# Patient Record
Sex: FEMALE | Race: BLACK OR AFRICAN AMERICAN | Employment: UNEMPLOYED | URBAN - METROPOLITAN AREA
[De-identification: names, ages, dates, MRNs, and addresses within clinical notes are randomized per-mention and may not be internally consistent; named-entity substitution may affect disease eponyms.]

---

## 2023-03-22 ENCOUNTER — PREP FOR PROCEDURE (OUTPATIENT)
Dept: OPHTHALMOLOGY | Age: 58
End: 2023-03-22

## 2023-03-22 RX ORDER — PROPARACAINE HYDROCHLORIDE 5 MG/ML
1 SOLUTION/ DROPS OPHTHALMIC ONCE
Status: CANCELLED | OUTPATIENT
Start: 2023-03-27

## 2023-03-22 RX ORDER — PHENYLEPHRINE HCL 2.5 %
1 DROPS OPHTHALMIC (EYE) SEE ADMIN INSTRUCTIONS
Status: CANCELLED | OUTPATIENT
Start: 2023-03-27

## 2023-03-22 RX ORDER — SODIUM CHLORIDE 0.9 % (FLUSH) 0.9 %
5-40 SYRINGE (ML) INJECTION EVERY 12 HOURS SCHEDULED
Status: CANCELLED | OUTPATIENT
Start: 2023-03-22

## 2023-03-22 RX ORDER — SODIUM CHLORIDE 0.9 % (FLUSH) 0.9 %
5-40 SYRINGE (ML) INJECTION PRN
Status: CANCELLED | OUTPATIENT
Start: 2023-03-22

## 2023-03-22 RX ORDER — OFLOXACIN 3 MG/ML
1 SOLUTION/ DROPS OPHTHALMIC ONCE
Status: CANCELLED | OUTPATIENT
Start: 2023-03-27

## 2023-03-22 RX ORDER — TROPICAMIDE 10 MG/ML
1 SOLUTION/ DROPS OPHTHALMIC SEE ADMIN INSTRUCTIONS
Status: CANCELLED | OUTPATIENT
Start: 2023-03-27

## 2023-03-22 RX ORDER — SODIUM CHLORIDE 9 MG/ML
INJECTION, SOLUTION INTRAVENOUS PRN
Status: CANCELLED | OUTPATIENT
Start: 2023-03-22

## 2023-04-09 ENCOUNTER — PREP FOR PROCEDURE (OUTPATIENT)
Dept: OPHTHALMOLOGY | Age: 58
End: 2023-04-09

## 2023-04-09 RX ORDER — PHENYLEPHRINE HCL 2.5 %
1 DROPS OPHTHALMIC (EYE) SEE ADMIN INSTRUCTIONS
Status: CANCELLED | OUTPATIENT
Start: 2023-04-10

## 2023-04-09 RX ORDER — OFLOXACIN 3 MG/ML
1 SOLUTION/ DROPS OPHTHALMIC ONCE
Status: CANCELLED | OUTPATIENT
Start: 2023-04-10

## 2023-04-09 RX ORDER — SODIUM CHLORIDE 0.9 % (FLUSH) 0.9 %
5-40 SYRINGE (ML) INJECTION EVERY 12 HOURS SCHEDULED
Status: CANCELLED | OUTPATIENT
Start: 2023-04-09

## 2023-04-09 RX ORDER — PROPARACAINE HYDROCHLORIDE 5 MG/ML
1 SOLUTION/ DROPS OPHTHALMIC ONCE
Status: CANCELLED | OUTPATIENT
Start: 2023-04-10

## 2023-04-09 RX ORDER — SODIUM CHLORIDE 9 MG/ML
INJECTION, SOLUTION INTRAVENOUS PRN
Status: CANCELLED | OUTPATIENT
Start: 2023-04-09

## 2023-04-09 RX ORDER — TROPICAMIDE 10 MG/ML
1 SOLUTION/ DROPS OPHTHALMIC SEE ADMIN INSTRUCTIONS
Status: CANCELLED | OUTPATIENT
Start: 2023-04-10

## 2023-04-09 RX ORDER — SODIUM CHLORIDE 0.9 % (FLUSH) 0.9 %
5-40 SYRINGE (ML) INJECTION PRN
Status: CANCELLED | OUTPATIENT
Start: 2023-04-09

## 2023-05-08 ENCOUNTER — OFFICE VISIT (OUTPATIENT)
Dept: PRIMARY CARE | Facility: CLINIC | Age: 58
End: 2023-05-08
Payer: COMMERCIAL

## 2023-05-08 VITALS
HEART RATE: 72 BPM | HEIGHT: 62 IN | OXYGEN SATURATION: 100 % | TEMPERATURE: 97.6 F | SYSTOLIC BLOOD PRESSURE: 172 MMHG | BODY MASS INDEX: 40.06 KG/M2 | DIASTOLIC BLOOD PRESSURE: 110 MMHG

## 2023-05-08 DIAGNOSIS — R42 VERTIGO: ICD-10-CM

## 2023-05-08 DIAGNOSIS — D57.1 SICKLE-CELL DISEASE WITHOUT CRISIS (MULTI): ICD-10-CM

## 2023-05-08 DIAGNOSIS — I10 BENIGN ESSENTIAL HYPERTENSION: ICD-10-CM

## 2023-05-08 DIAGNOSIS — E11.43 DIABETIC AUTONOMIC NEUROPATHY ASSOCIATED WITH TYPE 2 DIABETES MELLITUS (MULTI): Primary | ICD-10-CM

## 2023-05-08 PROBLEM — E66.01 MORBID OBESITY WITH BODY MASS INDEX (BMI) OF 40.0 OR HIGHER (MULTI): Status: ACTIVE | Noted: 2023-05-08

## 2023-05-08 PROBLEM — Z90.81 ACQUIRED ABSENCE OF SPLEEN: Status: ACTIVE | Noted: 2019-04-02

## 2023-05-08 LAB
ANION GAP IN SER/PLAS: 10 MMOL/L (ref 10–20)
CALCIUM (MG/DL) IN SER/PLAS: 8.8 MG/DL (ref 8.6–10.6)
CARBON DIOXIDE, TOTAL (MMOL/L) IN SER/PLAS: 30 MMOL/L (ref 21–32)
CHLORIDE (MMOL/L) IN SER/PLAS: 103 MMOL/L (ref 98–107)
CHOLESTEROL (MG/DL) IN SER/PLAS: 174 MG/DL (ref 0–199)
CHOLESTEROL IN HDL (MG/DL) IN SER/PLAS: 38.5 MG/DL
CHOLESTEROL/HDL RATIO: 4.5
CREATININE (MG/DL) IN SER/PLAS: 0.89 MG/DL (ref 0.5–1.05)
GFR FEMALE: 75 ML/MIN/1.73M2
GLUCOSE (MG/DL) IN SER/PLAS: 228 MG/DL (ref 74–99)
LDL: 98 MG/DL (ref 0–99)
POC HEMOGLOBIN A1C: 14 % (ref 4.2–6.5)
POTASSIUM (MMOL/L) IN SER/PLAS: 4.4 MMOL/L (ref 3.5–5.3)
SODIUM (MMOL/L) IN SER/PLAS: 139 MMOL/L (ref 136–145)
TRIGLYCERIDE (MG/DL) IN SER/PLAS: 189 MG/DL (ref 0–149)
UREA NITROGEN (MG/DL) IN SER/PLAS: 18 MG/DL (ref 6–23)
VLDL: 38 MG/DL (ref 0–40)

## 2023-05-08 PROCEDURE — 99214 OFFICE O/P EST MOD 30 MIN: CPT | Performed by: FAMILY MEDICINE

## 2023-05-08 PROCEDURE — 83036 HEMOGLOBIN GLYCOSYLATED A1C: CPT | Performed by: FAMILY MEDICINE

## 2023-05-08 PROCEDURE — 4010F ACE/ARB THERAPY RXD/TAKEN: CPT | Performed by: FAMILY MEDICINE

## 2023-05-08 PROCEDURE — 80061 LIPID PANEL: CPT

## 2023-05-08 PROCEDURE — 3008F BODY MASS INDEX DOCD: CPT | Performed by: FAMILY MEDICINE

## 2023-05-08 PROCEDURE — 3080F DIAST BP >= 90 MM HG: CPT | Performed by: FAMILY MEDICINE

## 2023-05-08 PROCEDURE — 1036F TOBACCO NON-USER: CPT | Performed by: FAMILY MEDICINE

## 2023-05-08 PROCEDURE — 80048 BASIC METABOLIC PNL TOTAL CA: CPT

## 2023-05-08 PROCEDURE — 3077F SYST BP >= 140 MM HG: CPT | Performed by: FAMILY MEDICINE

## 2023-05-08 RX ORDER — LISINOPRIL 40 MG/1
40 TABLET ORAL DAILY
COMMUNITY
Start: 2019-10-14 | End: 2023-07-26 | Stop reason: SDUPTHER

## 2023-05-08 RX ORDER — METFORMIN HYDROCHLORIDE 1000 MG/1
1000 TABLET ORAL
COMMUNITY
Start: 2019-06-04 | End: 2024-01-18 | Stop reason: HOSPADM

## 2023-05-08 RX ORDER — INSULIN GLARGINE 100 [IU]/ML
75 INJECTION, SOLUTION SUBCUTANEOUS NIGHTLY
COMMUNITY
End: 2023-05-08 | Stop reason: SDUPTHER

## 2023-05-08 RX ORDER — MECLIZINE HCL 25MG 25 MG/1
25 TABLET, CHEWABLE ORAL 3 TIMES DAILY PRN
Qty: 90 TABLET | Refills: 1 | Status: SHIPPED | OUTPATIENT
Start: 2023-05-08 | End: 2023-07-26 | Stop reason: SDUPTHER

## 2023-05-08 RX ORDER — ATORVASTATIN CALCIUM 40 MG/1
40 TABLET, FILM COATED ORAL DAILY
COMMUNITY
End: 2023-07-26 | Stop reason: SDUPTHER

## 2023-05-08 RX ORDER — INSULIN LISPRO 100 [IU]/ML
0.1 INJECTION, SOLUTION INTRAVENOUS; SUBCUTANEOUS
COMMUNITY
End: 2024-02-27 | Stop reason: SDUPTHER

## 2023-05-08 RX ORDER — GABAPENTIN 300 MG/1
300 CAPSULE ORAL 2 TIMES DAILY
Status: ON HOLD | COMMUNITY
End: 2024-01-25 | Stop reason: ALTCHOICE

## 2023-05-08 RX ORDER — INSULIN GLARGINE 100 [IU]/ML
75 INJECTION, SOLUTION SUBCUTANEOUS NIGHTLY
Qty: 67.5 ML | Refills: 1 | Status: SHIPPED | OUTPATIENT
Start: 2023-05-08 | End: 2024-01-18 | Stop reason: HOSPADM

## 2023-05-08 RX ORDER — MECLIZINE HCL 25MG 25 MG/1
25 TABLET, CHEWABLE ORAL DAILY PRN
COMMUNITY
End: 2023-05-08 | Stop reason: SDUPTHER

## 2023-05-08 RX ORDER — CLONIDINE HYDROCHLORIDE 0.1 MG/1
0.1 TABLET ORAL 2 TIMES DAILY
Qty: 60 TABLET | Refills: 5 | Status: ON HOLD | OUTPATIENT
Start: 2023-05-08 | End: 2024-01-18

## 2023-05-08 ASSESSMENT — PAIN SCALES - GENERAL: PAINLEVEL: 0-NO PAIN

## 2023-05-08 ASSESSMENT — PATIENT HEALTH QUESTIONNAIRE - PHQ9
2. FEELING DOWN, DEPRESSED OR HOPELESS: NOT AT ALL
SUM OF ALL RESPONSES TO PHQ9 QUESTIONS 1 AND 2: 0
1. LITTLE INTEREST OR PLEASURE IN DOING THINGS: NOT AT ALL

## 2023-05-08 NOTE — PROGRESS NOTES
"Subjective   Patient ID: Nuris Squires is a 57 y.o. female who presents for Diabetes (Pt,. Presents for 3 month DM follow up and feeling shaky ).  Glucose 150-250 any time of day.  75 units basaglar.  Humalog with meals.    Has a lot of stress.  Not checking BP at home.  Has HA.  No swelling.  No dizziness, but has meclizine as needed for vertigo    Retina surgery 2 months ago.  Detached retina  on the left.    Energy is not the best.  Sleeps OK, not enough hours.          Diabetes        Review of Systems    Objective   BP (!) 172/110 (BP Location: Right arm, Patient Position: Sitting, BP Cuff Size: Large adult)   Pulse 72   Temp 36.4 °C (97.6 °F) (Temporal)   Ht 1.575 m (5' 2\")   SpO2 100%   BMI 40.06 kg/m²     Physical Exam  Constitutional:       Appearance: Normal appearance.   Cardiovascular:      Rate and Rhythm: Normal rate and regular rhythm.      Heart sounds: No murmur heard.  Pulmonary:      Effort: Pulmonary effort is normal.      Breath sounds: Normal breath sounds.   Musculoskeletal:      Right lower leg: No edema.      Left lower leg: No edema.   Neurological:      Mental Status: She is alert.           Assessment/Plan   Problem List Items Addressed This Visit    None         "

## 2023-05-08 NOTE — PATIENT INSTRUCTIONS
A1C 14.0, indicating very poor control of diabetes.  Continue Basaglar 75 units at night, and humalog 10 units with meals.  Restart trulicity 0.75 weekly.  Monitor glucose.  If going below 90, call.  Recheck A1C in 3 months.  If still high, will need to refer to endocrinologist.    BP very high.  Continue lisninopril 40 mg, amlodipine 10 mg.  Add clonidine 0.1 mg twice a day.  Monitor BP if able.  Follow up to recheck in 1 month.                                               Treatment goals for BP and glucose include:  HgbA1C less than 7.0  /80 on consistent basis, with no higher than 140/85  LDL cholesterol less than 100, and HDL cholesterol above 40.  Yearly retinal exam  Check feet periodically for sores or decreased sensation  Exercise at least 150 minutes weekly.  Work toward a goal BMI of less than 25.    For cholesterol, continue atorvastatin 20 mg.    Sickle cell stable.

## 2023-06-14 ENCOUNTER — OFFICE VISIT (OUTPATIENT)
Dept: PRIMARY CARE | Facility: CLINIC | Age: 58
End: 2023-06-14
Payer: COMMERCIAL

## 2023-06-14 VITALS
SYSTOLIC BLOOD PRESSURE: 138 MMHG | HEART RATE: 58 BPM | OXYGEN SATURATION: 98 % | RESPIRATION RATE: 16 BRPM | BODY MASS INDEX: 41.04 KG/M2 | DIASTOLIC BLOOD PRESSURE: 80 MMHG | HEIGHT: 62 IN | WEIGHT: 223 LBS

## 2023-06-14 DIAGNOSIS — E55.9 VITAMIN D DEFICIENCY: ICD-10-CM

## 2023-06-14 DIAGNOSIS — D57.1 SICKLE-CELL DISEASE WITHOUT CRISIS (MULTI): ICD-10-CM

## 2023-06-14 DIAGNOSIS — D64.9 ANEMIA, UNSPECIFIED TYPE: ICD-10-CM

## 2023-06-14 DIAGNOSIS — R53.83 LOW ENERGY: ICD-10-CM

## 2023-06-14 DIAGNOSIS — E66.01 MORBID OBESITY WITH BODY MASS INDEX (BMI) OF 40.0 OR HIGHER (MULTI): ICD-10-CM

## 2023-06-14 DIAGNOSIS — E11.43 DIABETIC AUTONOMIC NEUROPATHY ASSOCIATED WITH TYPE 2 DIABETES MELLITUS (MULTI): ICD-10-CM

## 2023-06-14 DIAGNOSIS — I10 BENIGN ESSENTIAL HYPERTENSION: Primary | ICD-10-CM

## 2023-06-14 LAB
CALCIDIOL (25 OH VITAMIN D3) (NG/ML) IN SER/PLAS: 12 NG/ML
ERYTHROCYTE DISTRIBUTION WIDTH (RATIO) BY AUTOMATED COUNT: 12.1 % (ref 11.5–14.5)
ERYTHROCYTE MEAN CORPUSCULAR HEMOGLOBIN CONCENTRATION (G/DL) BY AUTOMATED: 31.3 G/DL (ref 32–36)
ERYTHROCYTE MEAN CORPUSCULAR VOLUME (FL) BY AUTOMATED COUNT: 94 FL (ref 80–100)
ERYTHROCYTES (10*6/UL) IN BLOOD BY AUTOMATED COUNT: 3.96 X10E12/L (ref 4–5.2)
HEMATOCRIT (%) IN BLOOD BY AUTOMATED COUNT: 37.4 % (ref 36–46)
HEMOGLOBIN (G/DL) IN BLOOD: 11.7 G/DL (ref 12–16)
IRON (UG/DL) IN SER/PLAS: 65 UG/DL (ref 35–150)
IRON BINDING CAPACITY (UG/DL) IN SER/PLAS: 260 UG/DL (ref 240–445)
IRON SATURATION (%) IN SER/PLAS: 25 % (ref 25–45)
LEUKOCYTES (10*3/UL) IN BLOOD BY AUTOMATED COUNT: 5.3 X10E9/L (ref 4.4–11.3)
NRBC (PER 100 WBCS) BY AUTOMATED COUNT: 0 /100 WBC (ref 0–0)
PLATELETS (10*3/UL) IN BLOOD AUTOMATED COUNT: 310 X10E9/L (ref 150–450)
THYROTROPIN (MIU/L) IN SER/PLAS BY DETECTION LIMIT <= 0.05 MIU/L: 0.83 MIU/L (ref 0.44–3.98)

## 2023-06-14 PROCEDURE — 83540 ASSAY OF IRON: CPT

## 2023-06-14 PROCEDURE — 4010F ACE/ARB THERAPY RXD/TAKEN: CPT | Performed by: FAMILY MEDICINE

## 2023-06-14 PROCEDURE — 99213 OFFICE O/P EST LOW 20 MIN: CPT | Performed by: FAMILY MEDICINE

## 2023-06-14 PROCEDURE — 82306 VITAMIN D 25 HYDROXY: CPT

## 2023-06-14 PROCEDURE — 85027 COMPLETE CBC AUTOMATED: CPT

## 2023-06-14 PROCEDURE — 1036F TOBACCO NON-USER: CPT | Performed by: FAMILY MEDICINE

## 2023-06-14 PROCEDURE — 83550 IRON BINDING TEST: CPT

## 2023-06-14 PROCEDURE — 3075F SYST BP GE 130 - 139MM HG: CPT | Performed by: FAMILY MEDICINE

## 2023-06-14 PROCEDURE — 3079F DIAST BP 80-89 MM HG: CPT | Performed by: FAMILY MEDICINE

## 2023-06-14 PROCEDURE — 3008F BODY MASS INDEX DOCD: CPT | Performed by: FAMILY MEDICINE

## 2023-06-14 PROCEDURE — 84443 ASSAY THYROID STIM HORMONE: CPT

## 2023-06-14 ASSESSMENT — PAIN SCALES - GENERAL: PAINLEVEL: 0-NO PAIN

## 2023-06-14 ASSESSMENT — PATIENT HEALTH QUESTIONNAIRE - PHQ9
2. FEELING DOWN, DEPRESSED OR HOPELESS: NOT AT ALL
1. LITTLE INTEREST OR PLEASURE IN DOING THINGS: NOT AT ALL
SUM OF ALL RESPONSES TO PHQ9 QUESTIONS 1 AND 2: 0

## 2023-06-14 ASSESSMENT — ENCOUNTER SYMPTOMS: HYPERTENSION: 1

## 2023-06-14 NOTE — PROGRESS NOTES
"Subjective   Patient ID: Nuris Squires is a 57 y.o. female who presents for Hypertension and Diabetes.  Glucose  around 200 with addition of trulicity  0.75 mg.  Tolerating meds, including clonidine that was  added last visit.  Energy always low,  Otherwise feeling  well.  No concerns.    Hypertension    Diabetes        Review of Systems    Objective   /80 (BP Location: Left arm, Patient Position: Sitting, BP Cuff Size: Adult)   Pulse 58   Resp 16   Ht 1.575 m (5' 2\")   Wt 101 kg (223 lb)   SpO2 98%   BMI 40.79 kg/m²     Physical Exam  Vitals reviewed.   Constitutional:       Appearance: Normal appearance.   Cardiovascular:      Rate and Rhythm: Normal rate and regular rhythm.      Heart sounds: No murmur heard.  Pulmonary:      Effort: Pulmonary effort is normal.      Breath sounds: Normal breath sounds.   Musculoskeletal:      Right lower leg: No edema.      Left lower leg: No edema.   Neurological:      Mental Status: She is alert.           Assessment/Plan   Problem List Items Addressed This Visit       Sickle-cell disease without crisis (CMS/[facility])    Relevant Orders    Vitamin D, Total    Benign essential hypertension - Primary    Diabetic autonomic neuropathy associated with type 2 diabetes mellitus (CMS/[facility])    Morbid obesity with body mass index (BMI) of 40.0 or higher (CMS/[facility])     Other Visit Diagnoses       Low energy        Relevant Orders    CBC    TSH with reflex to Free T4 if abnormal    Vitamin D, Total    Vitamin D deficiency        Relevant Orders    Vitamin D, Total    Anemia, unspecified type        Relevant Orders    Iron and TIBC             BP much better with  addition  of clonidine.  Continue clonidine 0.1 mg twice a  day, lisinopril 40 mg  daily, amlodipine 10 mg daily.  Follow up 3 months.    For diabetes, taking Basaglar 75 units at night, and humalog 10 units with meals.  Restarted trulicity 0.75 weekly.  Home glucose still high, around 200.  Continue to monitor  " glucose.  When due for refill  of trulicity, if  still high, will increase  dose to 1.5 weekly.  Follow up 3  months, and will check  A1C at that  time.    Low  energy may be due in part to the high glucose, will check CBC and thyroid.  If still anemic,  will need to find out why.

## 2023-06-14 NOTE — PATIENT INSTRUCTIONS
BP much better with  addition  of clonidine.  Continue clonidine 0.1 mg twice a  day, lisinopril 40 mg  daily, amlodipine 10 mg daily.  Follow up 3 months.    For diabetes, taking Basaglar 75 units at night, and humalog 10 units with meals.  Restarted trulicity 0.75 weekly.  Home glucose still high, around 200.  Continue to monitor  glucose.  When due for refill  of trulicity, if  still high, will increase  dose to 1.5 weekly.  Follow up 3  months, and will check  A1C at that  time.    Low  energy may be due in part to the high glucose, will check CBC and thyroid.  If still anemic,  will need to find out why.

## 2023-06-15 DIAGNOSIS — E55.9 VITAMIN D DEFICIENCY: Primary | ICD-10-CM

## 2023-06-15 RX ORDER — ERGOCALCIFEROL 1.25 MG/1
50000 CAPSULE ORAL
Qty: 12 CAPSULE | Refills: 1 | Status: SHIPPED | OUTPATIENT
Start: 2023-06-15 | End: 2023-06-19 | Stop reason: SDUPTHER

## 2023-06-19 ENCOUNTER — TELEPHONE (OUTPATIENT)
Dept: PRIMARY CARE | Facility: CLINIC | Age: 58
End: 2023-06-19

## 2023-06-19 DIAGNOSIS — E55.9 VITAMIN D DEFICIENCY: ICD-10-CM

## 2023-06-19 RX ORDER — ERGOCALCIFEROL 1.25 MG/1
50000 CAPSULE ORAL
Qty: 12 CAPSULE | Refills: 1 | Status: SHIPPED | OUTPATIENT
Start: 2023-06-19 | End: 2023-12-04

## 2023-07-24 DIAGNOSIS — E11.9 TYPE 2 DIABETES MELLITUS WITHOUT COMPLICATION, UNSPECIFIED WHETHER LONG TERM INSULIN USE (MULTI): Primary | ICD-10-CM

## 2023-07-26 DIAGNOSIS — R42 VERTIGO: ICD-10-CM

## 2023-07-26 DIAGNOSIS — E78.2 MIXED HYPERLIPIDEMIA: ICD-10-CM

## 2023-07-26 DIAGNOSIS — I10 BENIGN ESSENTIAL HYPERTENSION: Primary | ICD-10-CM

## 2023-07-26 RX ORDER — MECLIZINE HCL 25MG 25 MG/1
25 TABLET, CHEWABLE ORAL 3 TIMES DAILY PRN
Qty: 90 TABLET | Refills: 1 | Status: SHIPPED | OUTPATIENT
Start: 2023-07-26 | End: 2023-09-14 | Stop reason: SDUPTHER

## 2023-07-26 RX ORDER — ATORVASTATIN CALCIUM 40 MG/1
40 TABLET, FILM COATED ORAL DAILY
Qty: 90 TABLET | Refills: 0 | Status: SHIPPED | OUTPATIENT
Start: 2023-07-26 | End: 2024-02-27 | Stop reason: SDUPTHER

## 2023-07-26 RX ORDER — LISINOPRIL 40 MG/1
40 TABLET ORAL DAILY
Qty: 90 TABLET | Refills: 0 | Status: SHIPPED | OUTPATIENT
Start: 2023-07-26 | End: 2024-01-18 | Stop reason: HOSPADM

## 2023-07-26 RX ORDER — AMLODIPINE BESYLATE 10 MG/1
10 TABLET ORAL DAILY
Qty: 90 TABLET | Refills: 0 | Status: ON HOLD | OUTPATIENT
Start: 2023-07-26 | End: 2024-01-18 | Stop reason: SDUPTHER

## 2023-07-26 RX ORDER — AMLODIPINE BESYLATE 10 MG/1
10 TABLET ORAL DAILY
COMMUNITY
End: 2023-07-26 | Stop reason: SDUPTHER

## 2023-08-11 NOTE — PROGRESS NOTES
[facility] Pharmacist Visit - Diabetes Management  Referring Provider: Mone Aquino MD    Subjective   Nuris Squires is a 58 y.o. female who presents for Diabetes.    HPI  PMH significant for T2DM, HTN, HLD, PAD.  Patient has no known history of medullary thyroid cancer, pancreatitis, or complicated UTI.  Known DM complications include retinopathy, chronic kidney disease, and several toes amputated .diagnosed 15 years ago  Special needs/barriers to therapy: None identified    Lifestyle  Diet: 1-2 meals/day.   BK: Skips  LN: fast food ([employer]), grilled cheese  DN: chicken, pasta, burger and fries, pork chops  Snacks: Chips 3-4 times a week  Drinks: 3 regular soda, water    Physical Activity: None, limited due to brace on right leg    Diabetes Management  Current Medications: Metformin 1000 mg twice daily, Humalog 10 units with meals, Basaglar 50-70 units every night (takes 70 units when BG >200)  Previous Medications: None    Adherence:  Misses a dose ~twice a week  Adverse Effects: Dizziness, lightheadedness    Risk Reducing Meds  On ACEi/ARB: Yes   On SGLT2i: No   On GLP1-RA: No   On Statin: Yes     Glucose Monitoring  Glucometer/CGM Type: Onetouch Ultra 2, checks twice daily does not keep log    Previous home BG readings: None  Current home BG readings: Range from 89 to 500, typically 200's    Hypoglycemia: Shaking, occurs 2-3 times a month  Hyperglycemia: Polyuria (frequent urination) and Fatigue    Diabetic Eye Exam: Up to date, sees optho regularly  Monofilament Foot Exam: Up to date, 3 months      Secondary Prevention  ASCVD Risk:   The 10-year ASCVD risk score (Sunshine GÓMEZ, et al., 2019) is: 20%    Values used to calculate the score:      Age: 58 years      Sex: Female      Is Non- : Yes      Diabetic: Yes      Tobacco smoker: No      Systolic Blood Pressure: 138 mmHg      Is BP treated: Yes      HDL Cholesterol: 38.5 mg/dL      Total Cholesterol: 174 mg/dL  On Statin?: Yes, atorvastatin  "40mg daily    BP Readings from Last 3 Encounters:   06/14/23 138/80   05/08/23 (!) 172/110   02/08/23 165/77     Current HTN Regimen: lisinopril 40mg daily, amlodpine 10mg daily, clonidine 0.1mg twice daily  HTN at goal? No    Immunizations Needed: Annual Flu (declines), Prevnar, Shingrix, and Hepatitis B Series (patient considering)  Tobacco Use: non-smoker        Objective   Vitals  BP Readings from Last 2 Encounters:   06/14/23 138/80   05/08/23 (!) 172/110     BMI Readings from Last 1 Encounters:   06/14/23 40.79 kg/m²      Labs  A1C  Lab Results   Component Value Date    HGBA1C 14.0 (A) 05/08/2023    HGBA1C 13.3 10/20/2020    HGBA1C 10.1 07/04/2019     BMP  Lab Results   Component Value Date    CALCIUM 9.0 08/13/2023     08/13/2023    K 4.1 08/13/2023    CO2 21 08/13/2023     (H) 08/13/2023    BUN 21 08/13/2023    CREATININE 1.38 (H) 08/13/2023    GFRF 44 (A) 08/13/2023     LFTs  Lab Results   Component Value Date    ALT 11 08/13/2023    AST 11 08/13/2023    ALKPHOS 115 (H) 08/13/2023    BILITOT 0.4 08/13/2023     FLP  Lab Results   Component Value Date    TRIG 189 (H) 05/08/2023    CHOL 174 05/08/2023    HDL 38.5 (A) 05/08/2023     Urine Microalbumin  No results found for: \"ALBUR\", \"[id number]\"   Vitamin B12  No results found for: \"[id number]\"      Assessment/Plan   Problem List Items Addressed This Visit       Type 2 diabetes mellitus without complication (CMS/[facility])     Diabetes: Uncontrolled with last A1c 14.0% on 5/8/23. Patient is due for updated A1C. Current regimen includes Metformin 1000 mg twice daily, Humalog 10 units with meals, Basaglar 50-70 units every night (takes 70 units when BG >200). Home BG readings typically in 200's, but range from, 89 to 500. Due to BG above goal and highly variable as well as CKD, plan to start Jardiance and reduce metformin to max appropriate dose. Discussed risk of UTI and yeast infection with high A1c and advised hydration and good hygiene. Patient to make " dietary adjustments to reduce carbs/soda and increase non-starchy vegetable intake.  Reduce metformin to 1000mg daily, as last eGFR <45  Start taking Jardiance 25mg once daily.  Continue taking Basaglar 50 units every evening and Humalog 10 units before meals  Continue to monitor and record BG daily. Order for CGM will be sent to [facility] for updated A1c. Order placed.          Patient Education:  Fasting BG goal , Postprandial BG goal <180 mg/dL  A1C goal <7%  Reviewed dietary recommendations: Healthy Plate method  Recommended integrating exercise into weekly routine as able.   Aim for 30-40 minutes of moderate-intensity exercise (such as jogging, biking, swimming, etc.) for 5 days a week.  If movement is limited, consider seated or aquatic forms of exercise.  Counseled patient on relevant MOA, expectations, side effects, duration of therapy, contraindications, administration, and monitoring parameters  All questions and concerns addressed. Contact pharmacist with any further questions or concerns prior to next appointment.    Clinical Pharmacist follow-up: 8/31/23 at 10:00 am, Telehealth visit  Next PCP appointment: 9/14/23    Amanda Joe [facility]  Clinical Pharmacist  08/14/23    Continue all meds under the continuation of care with the referring provider and clinical pharmacy team.  Verbal consent to manage patient's drug therapy was obtained from the patient. They were informed they may decline to participate or withdraw from participation in pharmacy services at any time.

## 2023-08-14 ENCOUNTER — TELEMEDICINE (OUTPATIENT)
Dept: PHARMACY | Facility: HOSPITAL | Age: 58
End: 2023-08-14

## 2023-08-14 DIAGNOSIS — E11.9 TYPE 2 DIABETES MELLITUS WITHOUT COMPLICATION, UNSPECIFIED WHETHER LONG TERM INSULIN USE (MULTI): ICD-10-CM

## 2023-08-14 NOTE — ASSESSMENT & PLAN NOTE
Diabetes: Uncontrolled with last A1c 14.0% on 5/8/23. Patient is due for updated A1C. Current regimen includes Metformin 1000 mg twice daily, Humalog 10 units with meals, Basaglar 50-70 units every night (takes 70 units when BG >200). Home BG readings typically in 200's, but range from, 89 to 500. Due to BG above goal and highly variable as well as CKD, plan to start Jardiance and reduce metformin to max appropriate dose. Discussed risk of UTI and yeast infection with high A1c and advised hydration and good hygiene. Patient to make dietary adjustments to reduce carbs/soda and increase non-starchy vegetable intake.  Reduce metformin to 1000mg daily, as last eGFR <45  Start taking Jardiance 25mg once daily.  Continue taking Basaglar 50 units every evening and Humalog 10 units before meals  Continue to monitor and record BG daily. Order for CGM will be sent to [facility].  Due for updated A1c. Order placed.

## 2023-08-14 NOTE — PATIENT INSTRUCTIONS
Thank you for engaging in your appointment with Amanda Joe [facility] today! Please review the following instructions related to your care:    Start taking Jardiance 25mg once daily. Stay well hydrated and maintain good hygiene to reduce the risk of yeast or urinary tract infections. Contact your PCP or pharmacist regarding any symptoms that occur.  Continue taking Basaglar 50 units every evening and Humalog 10 units before meals.  Reduce metformin to 1,000 mg once daily.  Your Fasting BG goal is . Your BG goal 2 hours after a meal is <180. Your A1C goal is <7%.  Check your blood glucose in the morning and 2-hours after a meal and record. An order has been sent for a Dexcom G7 glucose monitor. You may be contacted by [facility] regarding this device.  A lab order has been placed for an updated A1c. Please complete this at your convenience.  Try making these changes to your diet: adjust your food types and portions to model the Healthy Plate Method and increase intake of protein (meat, fish, eggs, dairy, beans) and non-starchy vegetables while reducing intake of carbs (pasta, bread, rice, sweets).  Start adding exercise to your weekly routine as able. Try exercises that focus on upper body movement and strength building.  Consider transferring your prescriptions to [facility] pharmacy for free mail order delivery and other services.  Contact your pharmacist, Margie, at [phone] with any questions or concerns prior to your next appointment.    Clinical Pharmacist follow-up: 8/31/23 at 10:00 am, Telehealth visit  Next PCP appointment: 9/14/23

## 2023-09-11 ENCOUNTER — TELEMEDICINE (OUTPATIENT)
Dept: PHARMACY | Facility: HOSPITAL | Age: 58
End: 2023-09-11

## 2023-09-11 DIAGNOSIS — E11.9 TYPE 2 DIABETES MELLITUS WITHOUT COMPLICATION, UNSPECIFIED WHETHER LONG TERM INSULIN USE (MULTI): ICD-10-CM

## 2023-09-11 NOTE — PATIENT INSTRUCTIONS
Thank you for engaging in your appointment with Amanda Joe [facility] today! Please review the following instructions related to your care:    Continue taking Metformin 1000 mg once daily, Jardiance 25mg daily, Humalog 10 units with meals, Basaglar 50 units every night.   Starting using your Dexcom G7 to monitor your BG. Contact pharmacist if you need assistance setting up or using your device.  Start adding exercise to your weekly routine as able. Try exercises that focus on upper body movement and strength building, or consider water aerobics and swimming.  Consider transferring your prescriptions to [facility] pharmacy ([phone]) for free mail order delivery and other services.  Contact your pharmacist, Margie, at [phone] with any questions or concerns prior to your next appointment.    Clinical Pharmacist follow-up: 10/2/23 at 11:00am, Telehealth visit  Next PCP appointment:  9/14/23

## 2023-09-11 NOTE — PROGRESS NOTES
[facility] Pharmacist Visit - Diabetes Management  Referring Provider: Mone Aquino MD    Subjective   Nuris Squires is a 58 y.o. female who presents for No chief complaint on file..    HPI  PMH significant for T2DM, HTN, HLD, PAD.  Patient has no known history of medullary thyroid cancer, pancreatitis, or complicated UTI.  Diagnosed 15 years ago. Known DM complications include retinopathy, chronic kidney disease, and several toes amputated .  Special needs/barriers to therapy: None identified     Lifestyle  Diet: 1-2 meals/day. No recent changes  BK: Skips  LN: fast food ([employer]), grilled cheese  DN: chicken, pasta, burger and fries, pork chops  Snacks: Chips 3-4 times a week  Drinks: 3 regular soda, water     Physical Activity: None, limited due to brace on right leg    Diabetes Management  Current Medications:  Metformin 1000 mg once daily, Jardiance 25mg daily, Humalog 10 units with meals, Basaglar 50-70 units every night (takes 70 units when BG >200)   Previous Medications: None     Adherence:  Misses a dose ~twice a week  Adverse Effects: None    Risk Reducing Meds  On ACEi/ARB: Yes   On SGLT2i: No   On GLP1-RA: No   On Statin: Yes     Glucose Monitoring  Glucometer/CGM Type: Dexcom received but not started yet, (Onetouch Ultra 2 as backup)    Previous home BG readings: (8/14/23) Range from 89 to 500, typically 200's   Current home BG readings: Range from     Hypoglycemia: Patient denies signs and symptoms and No BG readings < 80 mg/dL  Hyperglycemia: Polyuria (frequent urination) and Fatigue    Diabetic Eye Exam: Up to date, completed with optho regularly  Monofilament Foot Exam: Up to date, completed May 2023      Secondary Prevention  ASCVD Risk:   The 10-year ASCVD risk score (Sunshine GÓMEZ, et al., 2019) is: 20%    Values used to calculate the score:      Age: 58 years      Sex: Female      Is Non- : Yes      Diabetic: Yes      Tobacco smoker: No      Systolic Blood Pressure: 138  "mmHg      Is BP treated: Yes      HDL Cholesterol: 38.5 mg/dL      Total Cholesterol: 174 mg/dL  On Statin?: Yes, atorvastatin 40mg daily    BP Readings from Last 3 Encounters:   06/14/23 138/80   05/08/23 (!) 172/110   02/08/23 165/77     Current HTN Regimen:  lisinopril 40mg daily, amlodpine 10mg daily, clonidine 0.1mg twice daily   HTN at goal? No    Immunizations Needed: Annual Flu (declines), Prevnar, Shingrix, and Hepatitis B Series (patient considering)   Tobacco Use: non-smoker      Objective   Vitals  BP Readings from Last 2 Encounters:   06/14/23 138/80   05/08/23 (!) 172/110     BMI Readings from Last 1 Encounters:   06/14/23 40.79 kg/m²      Labs  A1C  Lab Results   Component Value Date    HGBA1C 14.0 (A) 05/08/2023    HGBA1C 13.3 10/20/2020    HGBA1C 10.1 07/04/2019     BMP  Lab Results   Component Value Date    CALCIUM 9.0 08/13/2023     08/13/2023    K 4.1 08/13/2023    CO2 21 08/13/2023     (H) 08/13/2023    BUN 21 08/13/2023    CREATININE 1.38 (H) 08/13/2023    GFRF 44 (A) 08/13/2023     LFTs  Lab Results   Component Value Date    ALT 11 08/13/2023    AST 11 08/13/2023    ALKPHOS 115 (H) 08/13/2023    BILITOT 0.4 08/13/2023     FLP  Lab Results   Component Value Date    TRIG 189 (H) 05/08/2023    CHOL 174 05/08/2023    LDLF 98 05/08/2023    HDL 38.5 (A) 05/08/2023     Urine Microalbumin  Lab Results   Component Value Date    MICROALBCREA 998.6 (H) 08/24/2022     Vitamin B12  No results found for: \"[id number]\"      Assessment/Plan   Problem List Items Addressed This Visit    None      Patient Education:  Counseled patient on relevant MOA, expectations, side effects, duration of therapy, contraindications, administration, and monitoring parameters.  All questions and concerns addressed. Contact pharmacist with any further questions or concerns prior to next appointment.  Reviewed BG goals: Fasting BG goal , Postprandial BG goal <180 mg/dL, A1C goal <7%    Clinical Pharmacist " follow-up: 10/2/23 at 11:00am, Telehealth visit  Next PCP appointment:  9/14/23     Amanda Joe [facility]  Clinical Pharmacist  09/11/23    Continue all meds under the continuation of care with the referring provider and clinical pharmacy team.  Verbal consent to manage patient's drug therapy was obtained from the patient. They were informed they may decline to participate or withdraw from participation in pharmacy services at any time.

## 2023-09-11 NOTE — ASSESSMENT & PLAN NOTE
Diabetes: Uncontrolled with last A1c  14.0% on 5/8/23. Patient is due for updated A1C. Current regimen includes Metformin 1000 mg daily, Humalog 10 units with meals, Basaglar 50 units every night, and Jardiance 25mg daily started after last visit. Patient tolerating medication well. Home BG readings still vary widely from 's. Patient received Dexcom and plans to start using today. Due to highly variable BG and new CGM monitoring, plan to continue current regimen and follow-up to assess.  Continue taking metformin, Humalog, Basaglar, and Jardiance as directed.  Start to monitor BG frequently with Dexcom CGM.  Due for updated A1c, complete at next PCP visit.

## 2023-09-14 ENCOUNTER — OFFICE VISIT (OUTPATIENT)
Dept: PRIMARY CARE | Facility: CLINIC | Age: 58
End: 2023-09-14
Payer: COMMERCIAL

## 2023-09-14 VITALS
SYSTOLIC BLOOD PRESSURE: 172 MMHG | DIASTOLIC BLOOD PRESSURE: 92 MMHG | TEMPERATURE: 97.3 F | HEART RATE: 69 BPM | OXYGEN SATURATION: 98 % | HEIGHT: 62 IN | WEIGHT: 221.6 LBS | BODY MASS INDEX: 40.78 KG/M2

## 2023-09-14 DIAGNOSIS — I10 BENIGN ESSENTIAL HYPERTENSION: ICD-10-CM

## 2023-09-14 DIAGNOSIS — E11.3511 TYPE 2 DIABETES MELLITUS WITH RIGHT EYE AFFECTED BY PROLIFERATIVE RETINOPATHY AND MACULAR EDEMA, WITH LONG-TERM CURRENT USE OF INSULIN (MULTI): ICD-10-CM

## 2023-09-14 DIAGNOSIS — E11.9 TYPE 2 DIABETES MELLITUS WITHOUT COMPLICATION, WITHOUT LONG-TERM CURRENT USE OF INSULIN (MULTI): Primary | ICD-10-CM

## 2023-09-14 DIAGNOSIS — K52.9 COLITIS: ICD-10-CM

## 2023-09-14 DIAGNOSIS — E78.2 MIXED HYPERLIPIDEMIA: ICD-10-CM

## 2023-09-14 DIAGNOSIS — R42 VERTIGO: ICD-10-CM

## 2023-09-14 DIAGNOSIS — Z79.4 TYPE 2 DIABETES MELLITUS WITH RIGHT EYE AFFECTED BY PROLIFERATIVE RETINOPATHY AND MACULAR EDEMA, WITH LONG-TERM CURRENT USE OF INSULIN (MULTI): ICD-10-CM

## 2023-09-14 LAB — POC HEMOGLOBIN A1C: 12.1 % (ref 4.2–6.5)

## 2023-09-14 PROCEDURE — 3008F BODY MASS INDEX DOCD: CPT | Performed by: FAMILY MEDICINE

## 2023-09-14 PROCEDURE — 4010F ACE/ARB THERAPY RXD/TAKEN: CPT | Performed by: FAMILY MEDICINE

## 2023-09-14 PROCEDURE — 1036F TOBACCO NON-USER: CPT | Performed by: FAMILY MEDICINE

## 2023-09-14 PROCEDURE — 99214 OFFICE O/P EST MOD 30 MIN: CPT | Performed by: FAMILY MEDICINE

## 2023-09-14 PROCEDURE — 83036 HEMOGLOBIN GLYCOSYLATED A1C: CPT | Performed by: FAMILY MEDICINE

## 2023-09-14 PROCEDURE — 3077F SYST BP >= 140 MM HG: CPT | Performed by: FAMILY MEDICINE

## 2023-09-14 PROCEDURE — 3080F DIAST BP >= 90 MM HG: CPT | Performed by: FAMILY MEDICINE

## 2023-09-14 RX ORDER — MECLIZINE HCL 25MG 25 MG/1
25 TABLET, CHEWABLE ORAL 3 TIMES DAILY PRN
Qty: 90 TABLET | Refills: 1 | Status: SHIPPED | OUTPATIENT
Start: 2023-09-14

## 2023-09-14 ASSESSMENT — ENCOUNTER SYMPTOMS: HYPERTENSION: 1

## 2023-09-14 ASSESSMENT — PATIENT HEALTH QUESTIONNAIRE - PHQ9
1. LITTLE INTEREST OR PLEASURE IN DOING THINGS: NOT AT ALL
2. FEELING DOWN, DEPRESSED OR HOPELESS: NOT AT ALL
SUM OF ALL RESPONSES TO PHQ9 QUESTIONS 1 AND 2: 0

## 2023-09-14 ASSESSMENT — PAIN SCALES - GENERAL: PAINLEVEL: 0-NO PAIN

## 2023-09-14 NOTE — PATIENT INSTRUCTIONS
BP high today, did not take clonidine this AM  Taking clonidine 0.1 mg twice a  day, lisinopril 40 mg  daily, amlodipine 10 mg daily.  Follow up 3 months.     For diabetes, taking Basaglar 75 units at night, and humalog sliding scale with meals.  Working with  pharmacist to help gain better control.  Continue to monitor  glucose.  Just got Dexcom, which should help improve ability to manage diabetes.  Follow up 3  months, and will check  A1C at that  time.     For colitis with ongoing diarrhea, refer to gastroenterologist.

## 2023-09-14 NOTE — PROGRESS NOTES
"Subjective   Patient ID: Nuris Squires is a 58 y.o. female who presents for Diabetes (3 month DM follow up ) and Hypertension (Follow up).  Just got dexcom monitor.  Started seeing [facility] pharmacist..  Using basaglar 75 units, and sliding scale humalog.    Didn't take clonidine today, forgot.  Not checking BP often.    Was in ED 1.5 months  ago.  Had diarrhea, and told she had colitis.  Has continued to have loose stool.    Diabetes  She presents for her follow-up diabetic visit. She has type 2 diabetes mellitus. Her disease course has been improving. There are no hypoglycemic associated symptoms. Pertinent negatives for diabetes include no chest pain and no foot paresthesias. Diabetic complications include nephropathy, PVD and retinopathy. Risk factors for coronary artery disease include diabetes mellitus, dyslipidemia, hypertension, obesity and post-menopausal. She is compliant with treatment most of the time. An ACE inhibitor/angiotensin II receptor blocker is being taken. Eye exam is current.   Hypertension  This is a chronic problem. The problem is uncontrolled. Pertinent negatives include no anxiety, chest pain, palpitations or shortness of breath. The current treatment provides moderate improvement. Hypertensive end-organ damage includes PVD and retinopathy.       Review of Systems   Respiratory:  Negative for shortness of breath.    Cardiovascular:  Negative for chest pain and palpitations.       Objective   BP (!) 172/92 (BP Location: Right arm, Patient Position: Sitting, BP Cuff Size: Large adult)   Pulse 69   Temp 36.3 °C (97.3 °F) (Temporal)   Ht 1.575 m (5' 2\")   Wt 101 kg (221 lb 9.6 oz)   SpO2 98%   BMI 40.53 kg/m²     Physical Exam  Vitals reviewed.   Constitutional:       Appearance: Normal appearance.   Cardiovascular:      Rate and Rhythm: Normal rate and regular rhythm.      Heart sounds: No murmur heard.  Pulmonary:      Effort: Pulmonary effort is normal.      Breath sounds: Normal breath " sounds.   Musculoskeletal:      Right lower leg: No edema.      Left lower leg: No edema.   Neurological:      Mental Status: She is alert.           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

## 2023-09-16 ASSESSMENT — ENCOUNTER SYMPTOMS
SHORTNESS OF BREATH: 0
PALPITATIONS: 0

## 2023-09-25 PROBLEM — R42 VERTIGO: Status: ACTIVE | Noted: 2023-09-25

## 2023-09-25 PROBLEM — M25.462 EFFUSION, LEFT KNEE: Status: ACTIVE | Noted: 2018-09-07

## 2023-09-25 PROBLEM — R68.81 EARLY SATIETY: Status: ACTIVE | Noted: 2023-09-25

## 2023-09-25 PROBLEM — K59.09 CHRONIC CONSTIPATION: Status: ACTIVE | Noted: 2023-09-25

## 2023-09-25 PROBLEM — L84 CORNS AND CALLOSITIES: Status: ACTIVE | Noted: 2019-06-02

## 2023-09-25 PROBLEM — E66.01 SEVERE OBESITY (MULTI): Status: ACTIVE | Noted: 2023-09-25

## 2023-09-25 PROBLEM — N94.11 SUPERFICIAL DYSPAREUNIA: Status: ACTIVE | Noted: 2018-08-20

## 2023-09-25 PROBLEM — G89.29 CHRONIC LEFT-SIDED LOW BACK PAIN WITH LEFT-SIDED SCIATICA: Status: ACTIVE | Noted: 2019-04-29

## 2023-09-25 PROBLEM — M14.671 CHARCOT'S JOINT, RIGHT ANKLE AND FOOT: Status: ACTIVE | Noted: 2023-09-25

## 2023-09-25 PROBLEM — M54.42 CHRONIC LEFT-SIDED LOW BACK PAIN WITH LEFT-SIDED SCIATICA: Status: ACTIVE | Noted: 2019-04-29

## 2023-09-25 PROBLEM — K52.9 COLITIS: Status: ACTIVE | Noted: 2023-09-25

## 2023-09-25 RX ORDER — ESTRADIOL 0.1 MG/G
CREAM VAGINAL
Status: ON HOLD | COMMUNITY
Start: 2018-08-20 | End: 2024-01-10 | Stop reason: WASHOUT

## 2023-09-25 RX ORDER — MULTIVIT-MIN/IRON FUM/FOLIC AC 7.5 MG-4
1 TABLET ORAL DAILY
Status: ON HOLD | COMMUNITY
End: 2024-01-25 | Stop reason: WASHOUT

## 2023-09-25 RX ORDER — OFLOXACIN 3 MG/ML
SOLUTION/ DROPS OPHTHALMIC
Status: ON HOLD | COMMUNITY
Start: 2023-04-10 | End: 2024-01-10 | Stop reason: WASHOUT

## 2023-09-25 RX ORDER — LANOLIN ALCOHOL/MO/W.PET/CERES
400 CREAM (GRAM) TOPICAL 2 TIMES DAILY
COMMUNITY
Start: 2018-08-17 | End: 2024-01-18 | Stop reason: HOSPADM

## 2023-09-25 RX ORDER — ONDANSETRON 4 MG/1
TABLET, ORALLY DISINTEGRATING ORAL
Status: ON HOLD | COMMUNITY
Start: 2023-08-14 | End: 2024-01-10 | Stop reason: WASHOUT

## 2023-09-25 RX ORDER — NAPROXEN SODIUM 220 MG/1
1 TABLET, FILM COATED ORAL
COMMUNITY
Start: 2018-08-17 | End: 2024-02-27 | Stop reason: SDUPTHER

## 2023-09-25 RX ORDER — PREDNISOLONE ACETATE 10 MG/ML
SUSPENSION/ DROPS OPHTHALMIC
Status: ON HOLD | COMMUNITY
Start: 2023-03-14 | End: 2024-01-10 | Stop reason: WASHOUT

## 2023-09-25 RX ORDER — AMOXICILLIN 250 MG
CAPSULE ORAL
Status: ON HOLD | COMMUNITY
Start: 2019-04-08 | End: 2024-01-10 | Stop reason: WASHOUT

## 2023-09-25 RX ORDER — DORZOLAMIDE HCL 20 MG/ML
1 SOLUTION/ DROPS OPHTHALMIC 2 TIMES DAILY
Status: ON HOLD | COMMUNITY
End: 2024-01-10 | Stop reason: WASHOUT

## 2023-09-25 RX ORDER — DICYCLOMINE HYDROCHLORIDE 10 MG/1
CAPSULE ORAL
Status: ON HOLD | COMMUNITY
Start: 2023-08-14 | End: 2024-01-10 | Stop reason: WASHOUT

## 2023-09-25 RX ORDER — KETOROLAC TROMETHAMINE 5 MG/ML
SOLUTION OPHTHALMIC
Status: ON HOLD | COMMUNITY
Start: 2022-05-12 | End: 2024-01-10 | Stop reason: WASHOUT

## 2023-09-25 RX ORDER — OMEPRAZOLE 40 MG/1
40 CAPSULE, DELAYED RELEASE ORAL
Status: ON HOLD | COMMUNITY
Start: 2019-04-08 | End: 2024-01-10 | Stop reason: WASHOUT

## 2023-09-25 RX ORDER — ACETAMINOPHEN 500 MG
1 TABLET ORAL
COMMUNITY
Start: 2018-08-17 | End: 2024-02-27 | Stop reason: SDUPTHER

## 2023-12-14 ENCOUNTER — APPOINTMENT (OUTPATIENT)
Dept: PRIMARY CARE | Facility: CLINIC | Age: 58
End: 2023-12-14

## 2024-01-09 ENCOUNTER — APPOINTMENT (OUTPATIENT)
Dept: RADIOLOGY | Facility: HOSPITAL | Age: 59
DRG: 617 | End: 2024-01-09
Payer: COMMERCIAL

## 2024-01-09 ENCOUNTER — HOSPITAL ENCOUNTER (INPATIENT)
Facility: HOSPITAL | Age: 59
LOS: 7 days | Discharge: HOME | DRG: 617 | End: 2024-01-18
Attending: EMERGENCY MEDICINE | Admitting: INTERNAL MEDICINE
Payer: COMMERCIAL

## 2024-01-09 DIAGNOSIS — Z79.4 TYPE 2 DIABETES MELLITUS WITH RIGHT EYE AFFECTED BY PROLIFERATIVE RETINOPATHY AND MACULAR EDEMA, WITH LONG-TERM CURRENT USE OF INSULIN (MULTI): ICD-10-CM

## 2024-01-09 DIAGNOSIS — N39.0 ACUTE LOWER URINARY TRACT INFECTION: ICD-10-CM

## 2024-01-09 DIAGNOSIS — B35.1 DERMATOPHYTOSIS OF NAIL: ICD-10-CM

## 2024-01-09 DIAGNOSIS — E78.2 MIXED HYPERLIPIDEMIA: ICD-10-CM

## 2024-01-09 DIAGNOSIS — G89.29 CHRONIC LEFT-SIDED LOW BACK PAIN WITH LEFT-SIDED SCIATICA: ICD-10-CM

## 2024-01-09 DIAGNOSIS — E10.65 CONTROLLED TYPE 1 DIABETES MELLITUS WITH HYPERGLYCEMIA (MULTI): ICD-10-CM

## 2024-01-09 DIAGNOSIS — E11.43 DIABETIC AUTONOMIC NEUROPATHY ASSOCIATED WITH TYPE 2 DIABETES MELLITUS (MULTI): ICD-10-CM

## 2024-01-09 DIAGNOSIS — S98.111A AMPUTATION OF RIGHT GREAT TOE (CMS-HCC): ICD-10-CM

## 2024-01-09 DIAGNOSIS — E66.01 MORBID OBESITY WITH BODY MASS INDEX (BMI) OF 40.0 OR HIGHER (MULTI): ICD-10-CM

## 2024-01-09 DIAGNOSIS — D57.1 SICKLE-CELL DISEASE WITHOUT CRISIS (MULTI): ICD-10-CM

## 2024-01-09 DIAGNOSIS — R68.81 EARLY SATIETY: ICD-10-CM

## 2024-01-09 DIAGNOSIS — I10 BENIGN ESSENTIAL HYPERTENSION: ICD-10-CM

## 2024-01-09 DIAGNOSIS — I10 HYPERTENSION, UNSPECIFIED TYPE: ICD-10-CM

## 2024-01-09 DIAGNOSIS — M86.171 ACUTE OSTEOMYELITIS OF RIGHT FOOT (MULTI): ICD-10-CM

## 2024-01-09 DIAGNOSIS — L84 CORNS AND CALLOSITIES: ICD-10-CM

## 2024-01-09 DIAGNOSIS — E11.3511 TYPE 2 DIABETES MELLITUS WITH RIGHT EYE AFFECTED BY PROLIFERATIVE RETINOPATHY AND MACULAR EDEMA, WITH LONG-TERM CURRENT USE OF INSULIN (MULTI): ICD-10-CM

## 2024-01-09 DIAGNOSIS — N17.9 ACUTE KIDNEY INJURY (CMS-HCC): ICD-10-CM

## 2024-01-09 DIAGNOSIS — K52.9 COLITIS: ICD-10-CM

## 2024-01-09 DIAGNOSIS — K59.09 CHRONIC CONSTIPATION: ICD-10-CM

## 2024-01-09 DIAGNOSIS — Z90.81 ACQUIRED ABSENCE OF SPLEEN: ICD-10-CM

## 2024-01-09 DIAGNOSIS — E83.42 HYPOMAGNESEMIA: ICD-10-CM

## 2024-01-09 DIAGNOSIS — M14.671 CHARCOT'S JOINT, RIGHT ANKLE AND FOOT: ICD-10-CM

## 2024-01-09 DIAGNOSIS — I73.9 PERIPHERAL ARTERIAL DISEASE (CMS-HCC): ICD-10-CM

## 2024-01-09 DIAGNOSIS — R10.9 ABDOMINAL PAIN, UNSPECIFIED ABDOMINAL LOCATION: ICD-10-CM

## 2024-01-09 DIAGNOSIS — R42 VERTIGO: ICD-10-CM

## 2024-01-09 DIAGNOSIS — M25.462 EFFUSION, LEFT KNEE: ICD-10-CM

## 2024-01-09 DIAGNOSIS — E66.01 SEVERE OBESITY (MULTI): ICD-10-CM

## 2024-01-09 DIAGNOSIS — M79.671 RIGHT FOOT PAIN: ICD-10-CM

## 2024-01-09 DIAGNOSIS — M54.42 CHRONIC LEFT-SIDED LOW BACK PAIN WITH LEFT-SIDED SCIATICA: ICD-10-CM

## 2024-01-09 DIAGNOSIS — L08.9 RIGHT FOOT INFECTION: Primary | ICD-10-CM

## 2024-01-09 DIAGNOSIS — N94.11 SUPERFICIAL DYSPAREUNIA: ICD-10-CM

## 2024-01-09 LAB
ALBUMIN SERPL-MCNC: 3.2 G/DL (ref 3.5–5)
ALP BLD-CCNC: 115 U/L (ref 35–125)
ALT SERPL-CCNC: 6 U/L (ref 5–40)
ANION GAP SERPL CALC-SCNC: 12 MMOL/L
APPEARANCE UR: CLEAR
AST SERPL-CCNC: 9 U/L (ref 5–40)
BASOPHILS # BLD AUTO: 0.05 X10*3/UL (ref 0–0.1)
BASOPHILS NFR BLD AUTO: 0.5 %
BILIRUB SERPL-MCNC: 0.3 MG/DL (ref 0.1–1.2)
BILIRUB UR STRIP.AUTO-MCNC: NEGATIVE MG/DL
BUN SERPL-MCNC: 14 MG/DL (ref 8–25)
CALCIUM SERPL-MCNC: 9 MG/DL (ref 8.5–10.4)
CHLORIDE SERPL-SCNC: 91 MMOL/L (ref 97–107)
CO2 SERPL-SCNC: 25 MMOL/L (ref 24–31)
COLOR UR: ABNORMAL
CREAT SERPL-MCNC: 1.2 MG/DL (ref 0.4–1.6)
CRP SERPL-MCNC: 12.5 MG/DL (ref 0–2)
EGFRCR SERPLBLD CKD-EPI 2021: 52 ML/MIN/1.73M*2
EOSINOPHIL # BLD AUTO: 0.03 X10*3/UL (ref 0–0.7)
EOSINOPHIL NFR BLD AUTO: 0.3 %
ERYTHROCYTE [DISTWIDTH] IN BLOOD BY AUTOMATED COUNT: 12 % (ref 11.5–14.5)
ERYTHROCYTE [SEDIMENTATION RATE] IN BLOOD BY WESTERGREN METHOD: 54 MM/H (ref 0–30)
GLUCOSE SERPL-MCNC: 489 MG/DL (ref 65–99)
GLUCOSE UR STRIP.AUTO-MCNC: ABNORMAL MG/DL
HCG UR QL IA.RAPID: NEGATIVE
HCT VFR BLD AUTO: 31.4 % (ref 36–46)
HGB BLD-MCNC: 10.6 G/DL (ref 12–16)
IMM GRANULOCYTES # BLD AUTO: 0.05 X10*3/UL (ref 0–0.7)
IMM GRANULOCYTES NFR BLD AUTO: 0.5 % (ref 0–0.9)
KETONES UR STRIP.AUTO-MCNC: NEGATIVE MG/DL
LACTATE BLDV-SCNC: 1.3 MMOL/L (ref 0.4–2)
LEUKOCYTE ESTERASE UR QL STRIP.AUTO: ABNORMAL
LYMPHOCYTES # BLD AUTO: 1.83 X10*3/UL (ref 1.2–4.8)
LYMPHOCYTES NFR BLD AUTO: 18.2 %
MCH RBC QN AUTO: 30 PG (ref 26–34)
MCHC RBC AUTO-ENTMCNC: 33.8 G/DL (ref 32–36)
MCV RBC AUTO: 89 FL (ref 80–100)
MONOCYTES # BLD AUTO: 0.78 X10*3/UL (ref 0.1–1)
MONOCYTES NFR BLD AUTO: 7.7 %
MUCOUS THREADS #/AREA URNS AUTO: NORMAL /LPF
NEUTROPHILS # BLD AUTO: 7.33 X10*3/UL (ref 1.2–7.7)
NEUTROPHILS NFR BLD AUTO: 72.8 %
NITRITE UR QL STRIP.AUTO: NEGATIVE
NRBC BLD-RTO: 0 /100 WBCS (ref 0–0)
PH UR STRIP.AUTO: 5.5 [PH]
PLATELET # BLD AUTO: 376 X10*3/UL (ref 150–450)
POTASSIUM SERPL-SCNC: 3.7 MMOL/L (ref 3.4–5.1)
PROT SERPL-MCNC: 7.1 G/DL (ref 5.9–7.9)
PROT UR STRIP.AUTO-MCNC: ABNORMAL MG/DL
RBC # BLD AUTO: 3.53 X10*6/UL (ref 4–5.2)
RBC # UR STRIP.AUTO: ABNORMAL /UL
RBC #/AREA URNS AUTO: NORMAL /HPF
SARS-COV-2 RNA RESP QL NAA+PROBE: NOT DETECTED
SODIUM SERPL-SCNC: 128 MMOL/L (ref 133–145)
SP GR UR STRIP.AUTO: 1.02
SQUAMOUS #/AREA URNS AUTO: NORMAL /HPF
UROBILINOGEN UR STRIP.AUTO-MCNC: NORMAL MG/DL
WBC # BLD AUTO: 10.1 X10*3/UL (ref 4.4–11.3)
WBC #/AREA URNS AUTO: NORMAL /HPF

## 2024-01-09 PROCEDURE — 81025 URINE PREGNANCY TEST: CPT | Performed by: EMERGENCY MEDICINE

## 2024-01-09 PROCEDURE — 96367 TX/PROPH/DG ADDL SEQ IV INF: CPT

## 2024-01-09 PROCEDURE — 99285 EMERGENCY DEPT VISIT HI MDM: CPT | Performed by: EMERGENCY MEDICINE

## 2024-01-09 PROCEDURE — 85025 COMPLETE CBC W/AUTO DIFF WBC: CPT | Performed by: EMERGENCY MEDICINE

## 2024-01-09 PROCEDURE — 87086 URINE CULTURE/COLONY COUNT: CPT | Mod: WESLAB | Performed by: EMERGENCY MEDICINE

## 2024-01-09 PROCEDURE — G0378 HOSPITAL OBSERVATION PER HR: HCPCS

## 2024-01-09 PROCEDURE — 85652 RBC SED RATE AUTOMATED: CPT | Performed by: EMERGENCY MEDICINE

## 2024-01-09 PROCEDURE — 80053 COMPREHEN METABOLIC PANEL: CPT | Performed by: EMERGENCY MEDICINE

## 2024-01-09 PROCEDURE — 36415 COLL VENOUS BLD VENIPUNCTURE: CPT | Performed by: EMERGENCY MEDICINE

## 2024-01-09 PROCEDURE — 96366 THER/PROPH/DIAG IV INF ADDON: CPT

## 2024-01-09 PROCEDURE — 2500000004 HC RX 250 GENERAL PHARMACY W/ HCPCS (ALT 636 FOR OP/ED): Performed by: EMERGENCY MEDICINE

## 2024-01-09 PROCEDURE — 81001 URINALYSIS AUTO W/SCOPE: CPT | Performed by: EMERGENCY MEDICINE

## 2024-01-09 PROCEDURE — 83605 ASSAY OF LACTIC ACID: CPT | Performed by: EMERGENCY MEDICINE

## 2024-01-09 PROCEDURE — 87040 BLOOD CULTURE FOR BACTERIA: CPT | Mod: WESLAB | Performed by: EMERGENCY MEDICINE

## 2024-01-09 PROCEDURE — 96365 THER/PROPH/DIAG IV INF INIT: CPT

## 2024-01-09 PROCEDURE — 96361 HYDRATE IV INFUSION ADD-ON: CPT

## 2024-01-09 PROCEDURE — 87635 SARS-COV-2 COVID-19 AMP PRB: CPT | Performed by: EMERGENCY MEDICINE

## 2024-01-09 PROCEDURE — 86140 C-REACTIVE PROTEIN: CPT | Performed by: EMERGENCY MEDICINE

## 2024-01-09 PROCEDURE — 2500000001 HC RX 250 WO HCPCS SELF ADMINISTERED DRUGS (ALT 637 FOR MEDICARE OP): Performed by: INTERNAL MEDICINE

## 2024-01-09 PROCEDURE — 73630 X-RAY EXAM OF FOOT: CPT | Mod: RT

## 2024-01-09 PROCEDURE — 2500000004 HC RX 250 GENERAL PHARMACY W/ HCPCS (ALT 636 FOR OP/ED): Performed by: INTERNAL MEDICINE

## 2024-01-09 RX ORDER — DEXTROSE 50 % IN WATER (D50W) INTRAVENOUS SYRINGE
25
Status: DISCONTINUED | OUTPATIENT
Start: 2024-01-09 | End: 2024-01-18 | Stop reason: HOSPADM

## 2024-01-09 RX ORDER — NAPROXEN SODIUM 220 MG/1
81 TABLET, FILM COATED ORAL DAILY
Status: DISCONTINUED | OUTPATIENT
Start: 2024-01-10 | End: 2024-01-18 | Stop reason: HOSPADM

## 2024-01-09 RX ORDER — KETOROLAC TROMETHAMINE 5 MG/ML
1 SOLUTION OPHTHALMIC 4 TIMES DAILY
Status: DISCONTINUED | OUTPATIENT
Start: 2024-01-09 | End: 2024-01-11

## 2024-01-09 RX ORDER — ACETAMINOPHEN 160 MG/5ML
650 SOLUTION ORAL EVERY 4 HOURS PRN
Status: DISCONTINUED | OUTPATIENT
Start: 2024-01-09 | End: 2024-01-18 | Stop reason: HOSPADM

## 2024-01-09 RX ORDER — GUAIFENESIN 600 MG/1
600 TABLET, EXTENDED RELEASE ORAL EVERY 12 HOURS PRN
Status: DISCONTINUED | OUTPATIENT
Start: 2024-01-09 | End: 2024-01-18 | Stop reason: HOSPADM

## 2024-01-09 RX ORDER — DEXTROSE MONOHYDRATE 100 MG/ML
0.3 INJECTION, SOLUTION INTRAVENOUS ONCE AS NEEDED
Status: DISCONTINUED | OUTPATIENT
Start: 2024-01-09 | End: 2024-01-18 | Stop reason: HOSPADM

## 2024-01-09 RX ORDER — ONDANSETRON 4 MG/1
4 TABLET, ORALLY DISINTEGRATING ORAL EVERY 8 HOURS PRN
Status: DISCONTINUED | OUTPATIENT
Start: 2024-01-09 | End: 2024-01-18 | Stop reason: HOSPADM

## 2024-01-09 RX ORDER — ACETAMINOPHEN 325 MG/1
650 TABLET ORAL ONCE
Status: COMPLETED | OUTPATIENT
Start: 2024-01-09 | End: 2024-01-09

## 2024-01-09 RX ORDER — PREDNISOLONE ACETATE 10 MG/ML
1 SUSPENSION/ DROPS OPHTHALMIC 2 TIMES DAILY
Status: DISCONTINUED | OUTPATIENT
Start: 2024-01-09 | End: 2024-01-10

## 2024-01-09 RX ORDER — OFLOXACIN 3 MG/ML
1 SOLUTION/ DROPS OPHTHALMIC 4 TIMES DAILY
Status: DISCONTINUED | OUTPATIENT
Start: 2024-01-09 | End: 2024-01-10

## 2024-01-09 RX ORDER — METFORMIN HYDROCHLORIDE 1000 MG/1
1000 TABLET ORAL
Status: DISCONTINUED | OUTPATIENT
Start: 2024-01-10 | End: 2024-01-11

## 2024-01-09 RX ORDER — GABAPENTIN 300 MG/1
300 CAPSULE ORAL 2 TIMES DAILY
Status: DISCONTINUED | OUTPATIENT
Start: 2024-01-09 | End: 2024-01-18 | Stop reason: HOSPADM

## 2024-01-09 RX ORDER — ESTRADIOL 0.1 MG/G
2 CREAM VAGINAL NIGHTLY
Status: DISCONTINUED | OUTPATIENT
Start: 2024-01-09 | End: 2024-01-18 | Stop reason: HOSPADM

## 2024-01-09 RX ORDER — ACETAMINOPHEN 650 MG/1
650 SUPPOSITORY RECTAL EVERY 4 HOURS PRN
Status: DISCONTINUED | OUTPATIENT
Start: 2024-01-09 | End: 2024-01-18 | Stop reason: HOSPADM

## 2024-01-09 RX ORDER — CHOLECALCIFEROL (VITAMIN D3) 50 MCG
2000 TABLET ORAL
Status: DISCONTINUED | OUTPATIENT
Start: 2024-01-10 | End: 2024-01-18 | Stop reason: HOSPADM

## 2024-01-09 RX ORDER — PANTOPRAZOLE SODIUM 40 MG/1
40 TABLET, DELAYED RELEASE ORAL
Status: DISCONTINUED | OUTPATIENT
Start: 2024-01-10 | End: 2024-01-18 | Stop reason: HOSPADM

## 2024-01-09 RX ORDER — AMLODIPINE BESYLATE 10 MG/1
10 TABLET ORAL DAILY
Status: DISCONTINUED | OUTPATIENT
Start: 2024-01-10 | End: 2024-01-10

## 2024-01-09 RX ORDER — GUAIFENESIN/DEXTROMETHORPHAN 100-10MG/5
5 SYRUP ORAL EVERY 4 HOURS PRN
Status: DISCONTINUED | OUTPATIENT
Start: 2024-01-09 | End: 2024-01-18 | Stop reason: HOSPADM

## 2024-01-09 RX ORDER — VANCOMYCIN 1 G/200ML
1 INJECTION, SOLUTION INTRAVENOUS EVERY 24 HOURS
Status: DISCONTINUED | OUTPATIENT
Start: 2024-01-10 | End: 2024-01-11

## 2024-01-09 RX ORDER — AMOXICILLIN 250 MG
2 CAPSULE ORAL NIGHTLY
Status: DISCONTINUED | OUTPATIENT
Start: 2024-01-09 | End: 2024-01-18 | Stop reason: HOSPADM

## 2024-01-09 RX ORDER — ENOXAPARIN SODIUM 100 MG/ML
40 INJECTION SUBCUTANEOUS EVERY 12 HOURS SCHEDULED
Status: DISCONTINUED | OUTPATIENT
Start: 2024-01-10 | End: 2024-01-11

## 2024-01-09 RX ORDER — ATORVASTATIN CALCIUM 40 MG/1
40 TABLET, FILM COATED ORAL NIGHTLY
Status: DISCONTINUED | OUTPATIENT
Start: 2024-01-09 | End: 2024-01-18 | Stop reason: HOSPADM

## 2024-01-09 RX ORDER — INSULIN LISPRO 100 [IU]/ML
0-15 INJECTION, SOLUTION INTRAVENOUS; SUBCUTANEOUS
Status: DISCONTINUED | OUTPATIENT
Start: 2024-01-10 | End: 2024-01-18 | Stop reason: HOSPADM

## 2024-01-09 RX ORDER — VANCOMYCIN 1.5 G/300ML
1500 INJECTION, SOLUTION INTRAVENOUS ONCE
Status: COMPLETED | OUTPATIENT
Start: 2024-01-09 | End: 2024-01-09

## 2024-01-09 RX ORDER — CLONIDINE HYDROCHLORIDE 0.1 MG/1
0.1 TABLET ORAL 2 TIMES DAILY
Status: DISCONTINUED | OUTPATIENT
Start: 2024-01-09 | End: 2024-01-10

## 2024-01-09 RX ORDER — MULTIVIT-MIN/IRON FUM/FOLIC AC 7.5 MG-4
1 TABLET ORAL DAILY
Status: DISCONTINUED | OUTPATIENT
Start: 2024-01-10 | End: 2024-01-18 | Stop reason: HOSPADM

## 2024-01-09 RX ORDER — POLYETHYLENE GLYCOL 3350 17 G/17G
17 POWDER, FOR SOLUTION ORAL DAILY PRN
Status: DISCONTINUED | OUTPATIENT
Start: 2024-01-09 | End: 2024-01-18 | Stop reason: HOSPADM

## 2024-01-09 RX ORDER — ACETAMINOPHEN 325 MG/1
650 TABLET ORAL EVERY 4 HOURS PRN
Status: DISCONTINUED | OUTPATIENT
Start: 2024-01-09 | End: 2024-01-18 | Stop reason: HOSPADM

## 2024-01-09 RX ORDER — LISINOPRIL 40 MG/1
40 TABLET ORAL DAILY
Status: DISCONTINUED | OUTPATIENT
Start: 2024-01-10 | End: 2024-01-10

## 2024-01-09 RX ORDER — DICYCLOMINE HYDROCHLORIDE 10 MG/1
10 CAPSULE ORAL 4 TIMES DAILY
Status: DISCONTINUED | OUTPATIENT
Start: 2024-01-09 | End: 2024-01-18 | Stop reason: HOSPADM

## 2024-01-09 RX ORDER — MECLIZINE HYDROCHLORIDE 25 MG/1
25 TABLET ORAL 3 TIMES DAILY PRN
Status: DISCONTINUED | OUTPATIENT
Start: 2024-01-09 | End: 2024-01-18 | Stop reason: HOSPADM

## 2024-01-09 RX ORDER — DORZOLAMIDE HCL 20 MG/ML
1 SOLUTION/ DROPS OPHTHALMIC 3 TIMES DAILY
Status: DISCONTINUED | OUTPATIENT
Start: 2024-01-09 | End: 2024-01-11

## 2024-01-09 RX ORDER — INSULIN LISPRO 100 [IU]/ML
10 INJECTION, SOLUTION INTRAVENOUS; SUBCUTANEOUS
Status: DISCONTINUED | OUTPATIENT
Start: 2024-01-10 | End: 2024-01-10

## 2024-01-09 RX ORDER — LANOLIN ALCOHOL/MO/W.PET/CERES
400 CREAM (GRAM) TOPICAL DAILY
Status: DISCONTINUED | OUTPATIENT
Start: 2024-01-10 | End: 2024-01-18 | Stop reason: HOSPADM

## 2024-01-09 RX ADMIN — VANCOMYCIN 1.5 G: 1.5 INJECTION, SOLUTION INTRAVENOUS at 12:48

## 2024-01-09 RX ADMIN — ACETAMINOPHEN 650 MG: 325 TABLET ORAL at 12:26

## 2024-01-09 RX ADMIN — SODIUM CHLORIDE 2000 ML: 900 INJECTION, SOLUTION INTRAVENOUS at 15:03

## 2024-01-09 RX ADMIN — PIPERACILLIN SODIUM AND TAZOBACTAM SODIUM 3.38 G: 3; .375 INJECTION, SOLUTION INTRAVENOUS at 22:38

## 2024-01-09 RX ADMIN — ATORVASTATIN CALCIUM 40 MG: 40 TABLET, FILM COATED ORAL at 22:36

## 2024-01-09 RX ADMIN — PIPERACILLIN SODIUM AND TAZOBACTAM SODIUM 3.38 G: 3; .375 INJECTION, SOLUTION INTRAVENOUS at 12:26

## 2024-01-09 RX ADMIN — GABAPENTIN 300 MG: 300 CAPSULE ORAL at 22:36

## 2024-01-09 RX ADMIN — CLONIDINE HYDROCHLORIDE 0.1 MG: 0.1 TABLET ORAL at 22:36

## 2024-01-09 RX ADMIN — SODIUM CHLORIDE 500 ML: 900 INJECTION, SOLUTION INTRAVENOUS at 12:26

## 2024-01-09 ASSESSMENT — PAIN SCALES - GENERAL
PAINLEVEL_OUTOF10: 0 - NO PAIN
PAINLEVEL_OUTOF10: 0 - NO PAIN

## 2024-01-09 ASSESSMENT — LIFESTYLE VARIABLES
EVER FELT BAD OR GUILTY ABOUT YOUR DRINKING: NO
REASON UNABLE TO ASSESS: NO
EVER HAD A DRINK FIRST THING IN THE MORNING TO STEADY YOUR NERVES TO GET RID OF A HANGOVER: NO
HAVE YOU EVER FELT YOU SHOULD CUT DOWN ON YOUR DRINKING: NO
HAVE PEOPLE ANNOYED YOU BY CRITICIZING YOUR DRINKING: NO

## 2024-01-09 ASSESSMENT — COLUMBIA-SUICIDE SEVERITY RATING SCALE - C-SSRS
2. HAVE YOU ACTUALLY HAD ANY THOUGHTS OF KILLING YOURSELF?: NO
6. HAVE YOU EVER DONE ANYTHING, STARTED TO DO ANYTHING, OR PREPARED TO DO ANYTHING TO END YOUR LIFE?: NO
1. IN THE PAST MONTH, HAVE YOU WISHED YOU WERE DEAD OR WISHED YOU COULD GO TO SLEEP AND NOT WAKE UP?: NO

## 2024-01-09 ASSESSMENT — PAIN - FUNCTIONAL ASSESSMENT
PAIN_FUNCTIONAL_ASSESSMENT: 0-10
PAIN_FUNCTIONAL_ASSESSMENT: 0-10

## 2024-01-09 ASSESSMENT — PAIN DESCRIPTION - PROGRESSION: CLINICAL_PROGRESSION: NOT CHANGED

## 2024-01-09 NOTE — ED PROVIDER NOTES
HPI   No chief complaint on file.      58-year-old female presented emergency department the chief complaint infection of the right foot specifically right great toe.  Was at her podiatrist office who referred to the emergency department for IV antibiotics and admission.  Potentially debridement.  Patient is a poorly controlled diabetic.  She denies lightheadedness dizziness numbness weakness.  No other complaint.                          No data recorded                Patient History   Past Medical History:   Diagnosis Date    Personal history of other diseases of the circulatory system     History of hypertension    Personal history of other endocrine, nutritional and metabolic disease     History of hyperlipidemia    Personal history of other endocrine, nutritional and metabolic disease     History of diabetes mellitus     Past Surgical History:   Procedure Laterality Date    CT ANGIO NECK W AND WO IV CONTRAST  1/25/2023    CT NECK ANGIO W AND WO IV CONTRAST 1/25/2023 [facility]    CT HEAD ANGIO W AND WO IV CONTRAST  1/25/2023    CT HEAD ANGIO W AND WO IV CONTRAST 1/25/2023 DOCTOR OFFICE LEGAC[facility]    OTHER SURGICAL HISTORY  10/21/2020    Toe amputation     Family History   Problem Relation Name Age of Onset    Alzheimer's disease Mother      Diabetes Mother      Lung cancer Mother      Diabetes Father      Lung cancer Father      Pancreatic cancer Father      Diabetes Sister      Lung cancer Sister       Social History     Tobacco Use    Smoking status: Never     Passive exposure: Never    Smokeless tobacco: Never   Substance Use Topics    Alcohol use: Never    Drug use: Never       Physical Exam   ED Triage Vitals   Temp Pulse Resp BP   -- -- -- --      SpO2 Temp src Heart Rate Source Patient Position   -- -- -- --      BP Location FiO2 (%)     -- --       Physical Exam  Vitals and nursing note reviewed.   Constitutional:       Appearance: Normal appearance.   HENT:      Head: Normocephalic and  atraumatic.      Nose: Nose normal.   Eyes:      Pupils: Pupils are equal, round, and reactive to light.   Cardiovascular:      Rate and Rhythm: Normal rate and regular rhythm.   Pulmonary:      Effort: Pulmonary effort is normal.      Breath sounds: Normal breath sounds.   Abdominal:      General: Abdomen is flat.   Musculoskeletal:         General: Normal range of motion.      Cervical back: Normal range of motion.   Skin:     Comments: Erythematous right foot with excoriated right toe   Neurological:      General: No focal deficit present.      Mental Status: She is alert and oriented to person, place, and time.   Psychiatric:         Mood and Affect: Mood normal.         ED Course & MDM   Diagnoses as of 01/09/24 1500   Right foot infection   Controlled type 1 diabetes mellitus with hyperglycemia (CMS/[facility])   Hypertension, unspecified type   Acute lower urinary tract infection       Medical Decision Making  I have seen and evaluated this patient.  The attending physician has also seen and evaluated this patient.  Vital signs, laboratory testing and diagnostic images if applicable have been reviewed.  All laboratory and imaging is interpreted by myself unless otherwise stated.  Radiology studies are also formally interpreted by radiologist.    Patient with leukocytosis elevation of sedimentation rate and C-reactive protein.  Poorly controlled diabetic with wound infection.  Broad-spectrum antibiotics initiated.  Admitted for further treatment management.    Labs Reviewed  URINALYSIS WITH REFLEX MICROSCOPIC - Abnormal     Color, Urine                                         Appearance, Urine             Clear                  Specific Gravity, Urine       1.021                  pH, Urine                     5.5                    Protein, Urine                100 (2+) (*)               Glucose, Urine                OVER (4+) (*)               Blood, Urine                  0.1 (1+) (*)               Ketones, Urine                 NEGATIVE                Bilirubin, Urine              NEGATIVE                Urobilinogen, Urine           Normal                 Nitrite, Urine                NEGATIVE                Leukocyte Esterase, Urine     250 Rashi/µL (*)            CBC WITH AUTO DIFFERENTIAL - Abnormal     WBC                           10.1                   nRBC                          0.0                    RBC                           3.53 (*)               Hemoglobin                    10.6 (*)               Hematocrit                    31.4 (*)               MCV                           89                     MCH                           30.0                   MCHC                          33.8                   RDW                           12.0                   Platelets                     376                    Neutrophils %                 72.8                   Immature Granulocytes %, Automated   0.5                    Lymphocytes %                 18.2                   Monocytes %                   7.7                    Eosinophils %                 0.3                    Basophils %                   0.5                    Neutrophils Absolute          7.33                   Immature Granulocytes Absolute, Au*   0.05                   Lymphocytes Absolute          1.83                   Monocytes Absolute            0.78                   Eosinophils Absolute          0.03                   Basophils Absolute            0.05                COMPREHENSIVE METABOLIC PANEL - Abnormal     Glucose                       489 (*)                Sodium                        128 (*)                Potassium                     3.7                    Chloride                      91 (*)                 Bicarbonate                   25                     Urea Nitrogen                 14                     Creatinine                    1.20                   eGFR                          52 (*)                  Calcium                       9.0                    Albumin                       3.2 (*)                Alkaline Phosphatase          115                    Total Protein                 7.1                    AST                           9                      Bilirubin, Total              0.3                    ALT                           6                      Anion Gap                     12                  SEDIMENTATION RATE, AUTOMATED - Abnormal     Sedimentation Rate            54 (*)              C-REACTIVE PROTEIN - Abnormal     C-Reactive Protein            12.50 (*)            BLOOD GAS LACTIC ACID, VENOUS - Normal     POCT Lactate, Venous          1.3                 HCG, URINE, QUALITATIVE - Normal     HCG, Urine                    NEGATIVE             SARS-COV-2 PCR, SYMPTOMATIC - Normal     Coronavirus 2019, PCR                                    Narrative: This assay has received FDA Emergency Use Authorization (EUA) and is only authorized for the duration of time that circumstances exist to justify the authorization of the emergency use of in vitro diagnostic tests for the detection of SARS-CoV-2 virus and/or diagnosis of COVID-19 infection under section 564(b)(1) of the Act, 21 U.S.C. 360bbb-3(b)(1). This assay is an in vitro diagnostic nucleic acid amplification test for the qualitative detection of SARS-CoV-2 from nasopharyngeal specimens and has been validated for use at [facility]. Negative results do not preclude COVID-19 infections and should not be used as the sole basis for diagnosis, treatment, or other management decisions.                    BLOOD CULTURE  BLOOD CULTURE  URINE CULTURE  MICROSCOPIC ONLY, URINE     WBC, Urine                    1-5                    RBC, Urine                    1-2                    Squamous Epithelial Cells, Urine                          Mucus, Urine                  FEW                 BLOOD GAS LACTIC ACID, VENOUS    XR  foot right 3+ views   Final Result    Soft tissue swelling of the right great toe without bony erosion or    underlying soft tissue gas.          Chronic appearing degenerative changes. Arthrodesis of the right foot    as described in the body of the report.          Signed by: Marcin Vincent 1/9/2024 1:08 PM    Dictation workstation:   [id number]             Procedure  Procedures     Rubio Trimble PA-C  01/09/24 1503

## 2024-01-09 NOTE — PROGRESS NOTES
Attestation note/supervisory note for FRIEDA Trimble      The patient is a 58-year-old female presenting to the emergency department at the direction of her podiatrist, Dr. Pizano, to be admitted for a right foot infection.  The patient states that she does have a history of foot ulcers but she states that typically she has problems with her left foot.  She states that about a week ago she started having problems with her right foot.  She was having pain and swelling and redness.  She states that she noticed a foul odor coming from her foot.  She did see her podiatrist today and they told her she needed to come to the emergency room to be admitted.  She denies any recent injury or trauma.  No headache or visual changes.  No chest pain or shortness of breath.  No abdominal pain.  No nausea vomiting.  No diarrhea or constipation but no urinary complaints.  No fever or chills.  No focal weakness or numbness.  All pertinent positives and negatives are recorded above.  All other systems reviewed and otherwise negative.  Vital signs with hypertension but otherwise within normal limits.  Physical exam with a well-nourished well-developed female in no acute distress.  HEENT exam within normal limits.  She has no evidence of airway compromise or respiratory distress.  Abdominal exam is benign.  She has no gross motor, neurologic or vascular deficits on exam.  She does have erythema, edema and tenderness to palpation of the right foot near the base of the MTP joint of the first toe.  She does have a foul odor coming from the right first toe.  There is a wound.      Wound care provided by nursing staff.      Oral acetaminophen, IV fluids and IV antibiotics were ordered.      Diagnostic labs with evidence of a possible urinary tract infection, diabetic hyperglycemia without evidence of DKA, mild electrolyte imbalance, elevated CRP, elevated ESR otherwise unremarkable.      Initial lactic acid 1.3.  Repeat lactic pending at the time  of admission      XR foot right 3+ views   Final Result   Soft tissue swelling of the right great toe without bony erosion or   underlying soft tissue gas.        Chronic appearing degenerative changes. Arthrodesis of the right foot   as described in the body of the report.        Signed by: Marcin Vincent 1/9/2024 1:08 PM   Dictation workstation:   [id number]           The patient does have concern for osteomyelitis on exam but does not have x-ray findings supporting this diagnosis at this time.  The patient does have an elevated CRP and ESR though.      Cultures were obtained and IV fluids and IV antibiotics were ordered.      Patient was admitted for further management of her foot infection with IV antibiotics.      Impression/diagnosis  Right foot infection/wound  Hypertension, unspecified  Diabetic hyperglycemia  Electrolyte imbalance  Acute lower urinary tract infection      I personally saw the patient and performed a substantive portion of the visit including all aspects of the medical decision making.      I reviewed the results of the diagnostic labs and diagnostic imaging.  Formal radiology reading was completed by the radiologist      Mindy Mahoney MD

## 2024-01-10 ENCOUNTER — APPOINTMENT (OUTPATIENT)
Dept: RADIOLOGY | Facility: HOSPITAL | Age: 59
DRG: 617 | End: 2024-01-10
Payer: COMMERCIAL

## 2024-01-10 ENCOUNTER — APPOINTMENT (OUTPATIENT)
Dept: CARDIOLOGY | Facility: HOSPITAL | Age: 59
DRG: 617 | End: 2024-01-10
Payer: COMMERCIAL

## 2024-01-10 ENCOUNTER — ANESTHESIA EVENT (OUTPATIENT)
Dept: OPERATING ROOM | Facility: HOSPITAL | Age: 59
DRG: 617 | End: 2024-01-10
Payer: COMMERCIAL

## 2024-01-10 LAB
ALBUMIN SERPL-MCNC: 2.6 G/DL (ref 3.5–5)
ALP BLD-CCNC: 77 U/L (ref 35–125)
ALT SERPL-CCNC: <5 U/L (ref 5–40)
ANION GAP SERPL CALC-SCNC: 9 MMOL/L
APPEARANCE UR: ABNORMAL
AST SERPL-CCNC: 5 U/L (ref 5–40)
BACTERIA #/AREA URNS AUTO: ABNORMAL /HPF
BACTERIA UR CULT: ABNORMAL
BACTERIA UR CULT: ABNORMAL
BILIRUB SERPL-MCNC: 0.2 MG/DL (ref 0.1–1.2)
BILIRUB UR STRIP.AUTO-MCNC: NEGATIVE MG/DL
BUN SERPL-MCNC: 14 MG/DL (ref 8–25)
CALCIUM SERPL-MCNC: 8.5 MG/DL (ref 8.5–10.4)
CHLORIDE SERPL-SCNC: 102 MMOL/L (ref 97–107)
CO2 SERPL-SCNC: 23 MMOL/L (ref 24–31)
COLOR UR: ABNORMAL
CREAT SERPL-MCNC: 1.5 MG/DL (ref 0.4–1.6)
EGFRCR SERPLBLD CKD-EPI 2021: 40 ML/MIN/1.73M*2
ERYTHROCYTE [DISTWIDTH] IN BLOOD BY AUTOMATED COUNT: 12.2 % (ref 11.5–14.5)
GLUCOSE BLD MANUAL STRIP-MCNC: 138 MG/DL (ref 74–99)
GLUCOSE BLD MANUAL STRIP-MCNC: 140 MG/DL (ref 74–99)
GLUCOSE BLD MANUAL STRIP-MCNC: 153 MG/DL (ref 74–99)
GLUCOSE BLD MANUAL STRIP-MCNC: 220 MG/DL (ref 74–99)
GLUCOSE BLD MANUAL STRIP-MCNC: 271 MG/DL (ref 74–99)
GLUCOSE BLD MANUAL STRIP-MCNC: 405 MG/DL (ref 74–99)
GLUCOSE BLD MANUAL STRIP-MCNC: 89 MG/DL (ref 74–99)
GLUCOSE BLD MANUAL STRIP-MCNC: 93 MG/DL (ref 74–99)
GLUCOSE BLD MANUAL STRIP-MCNC: 99 MG/DL (ref 74–99)
GLUCOSE SERPL-MCNC: 151 MG/DL (ref 65–99)
GLUCOSE UR STRIP.AUTO-MCNC: ABNORMAL MG/DL
HCT VFR BLD AUTO: 27 % (ref 36–46)
HGB BLD-MCNC: 9 G/DL (ref 12–16)
KETONES UR STRIP.AUTO-MCNC: NEGATIVE MG/DL
LEUKOCYTE ESTERASE UR QL STRIP.AUTO: ABNORMAL
MCH RBC QN AUTO: 29.9 PG (ref 26–34)
MCHC RBC AUTO-ENTMCNC: 33.3 G/DL (ref 32–36)
MCV RBC AUTO: 90 FL (ref 80–100)
MUCOUS THREADS #/AREA URNS AUTO: ABNORMAL /LPF
NITRITE UR QL STRIP.AUTO: NEGATIVE
NRBC BLD-RTO: 0 /100 WBCS (ref 0–0)
PH UR STRIP.AUTO: 5.5 [PH]
PLATELET # BLD AUTO: 316 X10*3/UL (ref 150–450)
POTASSIUM SERPL-SCNC: 3.3 MMOL/L (ref 3.4–5.1)
PROT SERPL-MCNC: 5.8 G/DL (ref 5.9–7.9)
PROT UR STRIP.AUTO-MCNC: ABNORMAL MG/DL
RBC # BLD AUTO: 3.01 X10*6/UL (ref 4–5.2)
RBC # UR STRIP.AUTO: ABNORMAL /UL
RBC #/AREA URNS AUTO: ABNORMAL /HPF
SODIUM SERPL-SCNC: 134 MMOL/L (ref 133–145)
SP GR UR STRIP.AUTO: 1.03
SQUAMOUS #/AREA URNS AUTO: ABNORMAL /HPF
UROBILINOGEN UR STRIP.AUTO-MCNC: NORMAL MG/DL
VANCOMYCIN SERPL-MCNC: 10.6 UG/ML (ref 10–20)
WBC # BLD AUTO: 11.1 X10*3/UL (ref 4.4–11.3)
WBC #/AREA URNS AUTO: ABNORMAL /HPF
YEAST BUDDING #/AREA UR COMP ASSIST: PRESENT /HPF
YEAST HYPHAE #/AREA UR COMP ASSIST: PRESENT /HPF

## 2024-01-10 PROCEDURE — 97161 PT EVAL LOW COMPLEX 20 MIN: CPT | Mod: GP

## 2024-01-10 PROCEDURE — G0378 HOSPITAL OBSERVATION PER HR: HCPCS

## 2024-01-10 PROCEDURE — 82947 ASSAY GLUCOSE BLOOD QUANT: CPT

## 2024-01-10 PROCEDURE — 82947 ASSAY GLUCOSE BLOOD QUANT: CPT | Mod: 91

## 2024-01-10 PROCEDURE — 2500000004 HC RX 250 GENERAL PHARMACY W/ HCPCS (ALT 636 FOR OP/ED): Performed by: INTERNAL MEDICINE

## 2024-01-10 PROCEDURE — 36415 COLL VENOUS BLD VENIPUNCTURE: CPT | Performed by: INTERNAL MEDICINE

## 2024-01-10 PROCEDURE — 96372 THER/PROPH/DIAG INJ SC/IM: CPT | Performed by: INTERNAL MEDICINE

## 2024-01-10 PROCEDURE — 80053 COMPREHEN METABOLIC PANEL: CPT | Performed by: INTERNAL MEDICINE

## 2024-01-10 PROCEDURE — 87075 CULTR BACTERIA EXCEPT BLOOD: CPT | Mod: WESLAB,91 | Performed by: INTERNAL MEDICINE

## 2024-01-10 PROCEDURE — 85027 COMPLETE CBC AUTOMATED: CPT | Performed by: INTERNAL MEDICINE

## 2024-01-10 PROCEDURE — 2500000004 HC RX 250 GENERAL PHARMACY W/ HCPCS (ALT 636 FOR OP/ED): Performed by: NURSE PRACTITIONER

## 2024-01-10 PROCEDURE — 2500000001 HC RX 250 WO HCPCS SELF ADMINISTERED DRUGS (ALT 637 FOR MEDICARE OP): Performed by: NURSE PRACTITIONER

## 2024-01-10 PROCEDURE — 80202 ASSAY OF VANCOMYCIN: CPT | Performed by: INTERNAL MEDICINE

## 2024-01-10 PROCEDURE — 94760 N-INVAS EAR/PLS OXIMETRY 1: CPT

## 2024-01-10 PROCEDURE — 2500000001 HC RX 250 WO HCPCS SELF ADMINISTERED DRUGS (ALT 637 FOR MEDICARE OP): Performed by: INTERNAL MEDICINE

## 2024-01-10 PROCEDURE — 73701 CT LOWER EXTREMITY W/DYE: CPT | Mod: RT

## 2024-01-10 PROCEDURE — 81001 URINALYSIS AUTO W/SCOPE: CPT | Performed by: INTERNAL MEDICINE

## 2024-01-10 PROCEDURE — 96372 THER/PROPH/DIAG INJ SC/IM: CPT

## 2024-01-10 PROCEDURE — 2550000001 HC RX 255 CONTRASTS: Performed by: INTERNAL MEDICINE

## 2024-01-10 PROCEDURE — 2500000002 HC RX 250 W HCPCS SELF ADMINISTERED DRUGS (ALT 637 FOR MEDICARE OP, ALT 636 FOR OP/ED): Performed by: INTERNAL MEDICINE

## 2024-01-10 PROCEDURE — 93005 ELECTROCARDIOGRAM TRACING: CPT

## 2024-01-10 RX ORDER — INSULIN GLARGINE 100 [IU]/ML
70 INJECTION, SOLUTION SUBCUTANEOUS NIGHTLY
COMMUNITY
End: 2024-02-20 | Stop reason: WASHOUT

## 2024-01-10 RX ORDER — INSULIN LISPRO 100 [IU]/ML
10 INJECTION, SOLUTION INTRAVENOUS; SUBCUTANEOUS ONCE
Status: COMPLETED | OUTPATIENT
Start: 2024-01-10 | End: 2024-01-10

## 2024-01-10 RX ORDER — POTASSIUM CHLORIDE 1.5 G/1.58G
20 POWDER, FOR SOLUTION ORAL ONCE
Status: COMPLETED | OUTPATIENT
Start: 2024-01-10 | End: 2024-01-10

## 2024-01-10 RX ADMIN — PANTOPRAZOLE SODIUM 40 MG: 40 TABLET, DELAYED RELEASE ORAL at 06:04

## 2024-01-10 RX ADMIN — Medication 400 MG: at 08:36

## 2024-01-10 RX ADMIN — INSULIN LISPRO 10 UNITS: 100 INJECTION, SOLUTION INTRAVENOUS; SUBCUTANEOUS at 12:16

## 2024-01-10 RX ADMIN — Medication 1 TABLET: at 08:36

## 2024-01-10 RX ADMIN — METFORMIN HYDROCHLORIDE 1000 MG: 1000 TABLET ORAL at 16:55

## 2024-01-10 RX ADMIN — ATORVASTATIN CALCIUM 40 MG: 40 TABLET, FILM COATED ORAL at 20:51

## 2024-01-10 RX ADMIN — ENOXAPARIN SODIUM 40 MG: 40 INJECTION SUBCUTANEOUS at 20:51

## 2024-01-10 RX ADMIN — PIPERACILLIN SODIUM AND TAZOBACTAM SODIUM 3.38 G: 3; .375 INJECTION, SOLUTION INTRAVENOUS at 12:16

## 2024-01-10 RX ADMIN — DICYCLOMINE HYDROCHLORIDE 10 MG: 10 CAPSULE ORAL at 06:04

## 2024-01-10 RX ADMIN — POTASSIUM CHLORIDE 20 MEQ: 1.5 POWDER, FOR SOLUTION ORAL at 08:50

## 2024-01-10 RX ADMIN — GABAPENTIN 300 MG: 300 CAPSULE ORAL at 20:51

## 2024-01-10 RX ADMIN — DICYCLOMINE HYDROCHLORIDE 10 MG: 10 CAPSULE ORAL at 20:51

## 2024-01-10 RX ADMIN — SENNOSIDES AND DOCUSATE SODIUM 2 TABLET: 8.6; 5 TABLET ORAL at 20:51

## 2024-01-10 RX ADMIN — ASPIRIN 81 MG: 81 TABLET, CHEWABLE ORAL at 08:36

## 2024-01-10 RX ADMIN — CLONIDINE HYDROCHLORIDE 0.1 MG: 0.1 TABLET ORAL at 08:35

## 2024-01-10 RX ADMIN — INSULIN LISPRO 10 UNITS: 100 INJECTION, SOLUTION INTRAVENOUS; SUBCUTANEOUS at 08:57

## 2024-01-10 RX ADMIN — ENOXAPARIN SODIUM 40 MG: 40 INJECTION SUBCUTANEOUS at 00:25

## 2024-01-10 RX ADMIN — INSULIN LISPRO 10 UNITS: 100 INJECTION, SOLUTION INTRAVENOUS; SUBCUTANEOUS at 00:26

## 2024-01-10 RX ADMIN — GABAPENTIN 300 MG: 300 CAPSULE ORAL at 08:35

## 2024-01-10 RX ADMIN — CEFEPIME 2 G: 2 INJECTION, POWDER, FOR SOLUTION INTRAVENOUS at 16:55

## 2024-01-10 RX ADMIN — VANCOMYCIN 1 G: 1 INJECTION, SOLUTION INTRAVENOUS at 13:24

## 2024-01-10 RX ADMIN — ENOXAPARIN SODIUM 40 MG: 40 INJECTION SUBCUTANEOUS at 08:35

## 2024-01-10 RX ADMIN — Medication 2000 UNITS: at 06:04

## 2024-01-10 RX ADMIN — IOHEXOL 75 ML: 350 INJECTION, SOLUTION INTRAVENOUS at 11:23

## 2024-01-10 RX ADMIN — LISINOPRIL 40 MG: 40 TABLET ORAL at 08:36

## 2024-01-10 RX ADMIN — SODIUM CHLORIDE 500 ML: 900 INJECTION, SOLUTION INTRAVENOUS at 14:08

## 2024-01-10 RX ADMIN — SODIUM CHLORIDE 500 ML: 900 INJECTION, SOLUTION INTRAVENOUS at 15:01

## 2024-01-10 RX ADMIN — AMLODIPINE BESYLATE 10 MG: 10 TABLET ORAL at 08:36

## 2024-01-10 RX ADMIN — METFORMIN HYDROCHLORIDE 1000 MG: 1000 TABLET ORAL at 08:35

## 2024-01-10 RX ADMIN — PIPERACILLIN SODIUM AND TAZOBACTAM SODIUM 3.38 G: 3; .375 INJECTION, SOLUTION INTRAVENOUS at 06:03

## 2024-01-10 SDOH — SOCIAL STABILITY: SOCIAL INSECURITY: DO YOU FEEL ANYONE HAS EXPLOITED OR TAKEN ADVANTAGE OF YOU FINANCIALLY OR OF YOUR PERSONAL PROPERTY?: NO

## 2024-01-10 SDOH — SOCIAL STABILITY: SOCIAL INSECURITY: ARE YOU OR HAVE YOU BEEN THREATENED OR ABUSED PHYSICALLY, EMOTIONALLY, OR SEXUALLY BY ANYONE?: NO

## 2024-01-10 SDOH — SOCIAL STABILITY: SOCIAL INSECURITY: POSSIBLE ABUSE REPORTED TO:: OTHER (COMMENT)

## 2024-01-10 SDOH — SOCIAL STABILITY: SOCIAL INSECURITY: HAS ANYONE EVER THREATENED TO HURT YOUR FAMILY OR YOUR PETS?: NO

## 2024-01-10 SDOH — SOCIAL STABILITY: SOCIAL INSECURITY: HAVE YOU HAD THOUGHTS OF HARMING ANYONE ELSE?: NO

## 2024-01-10 SDOH — SOCIAL STABILITY: SOCIAL INSECURITY: ABUSE: ADULT

## 2024-01-10 SDOH — SOCIAL STABILITY: SOCIAL INSECURITY: WERE YOU ABLE TO COMPLETE ALL THE BEHAVIORAL HEALTH SCREENINGS?: YES

## 2024-01-10 SDOH — HEALTH STABILITY: MENTAL HEALTH: EXPERIENCED ANY OF THE FOLLOWING LIFE EVENTS: OTHER (COMMENT)

## 2024-01-10 SDOH — SOCIAL STABILITY: SOCIAL INSECURITY: DOES ANYONE TRY TO KEEP YOU FROM HAVING/CONTACTING OTHER FRIENDS OR DOING THINGS OUTSIDE YOUR HOME?: NO

## 2024-01-10 SDOH — SOCIAL STABILITY: SOCIAL INSECURITY: DO YOU FEEL UNSAFE GOING BACK TO THE PLACE WHERE YOU ARE LIVING?: NO

## 2024-01-10 SDOH — SOCIAL STABILITY: SOCIAL INSECURITY: ARE THERE ANY APPARENT SIGNS OF INJURIES/BEHAVIORS THAT COULD BE RELATED TO ABUSE/NEGLECT?: NO

## 2024-01-10 ASSESSMENT — COGNITIVE AND FUNCTIONAL STATUS - GENERAL
DAILY ACTIVITIY SCORE: 23
DAILY ACTIVITIY SCORE: 24
PATIENT BASELINE BEDBOUND: NO
CLIMB 3 TO 5 STEPS WITH RAILING: A LITTLE
CLIMB 3 TO 5 STEPS WITH RAILING: A LOT
MOBILITY SCORE: 23
STANDING UP FROM CHAIR USING ARMS: A LITTLE
WALKING IN HOSPITAL ROOM: A LITTLE
MOVING TO AND FROM BED TO CHAIR: A LITTLE
DAILY ACTIVITIY SCORE: 24
MOBILITY SCORE: 19
MOBILITY SCORE: 24
DRESSING REGULAR LOWER BODY CLOTHING: A LITTLE

## 2024-01-10 ASSESSMENT — LIFESTYLE VARIABLES
SUBSTANCE_ABUSE_PAST_12_MONTHS: NO
SKIP TO QUESTIONS 9-10: 0
AUDIT-C TOTAL SCORE: 2
AUDIT-C TOTAL SCORE: 2
PRESCIPTION_ABUSE_PAST_12_MONTHS: NO
HOW MANY STANDARD DRINKS CONTAINING ALCOHOL DO YOU HAVE ON A TYPICAL DAY: 1 OR 2
HOW OFTEN DO YOU HAVE 6 OR MORE DRINKS ON ONE OCCASION: LESS THAN MONTHLY
HOW OFTEN DO YOU HAVE A DRINK CONTAINING ALCOHOL: MONTHLY OR LESS

## 2024-01-10 ASSESSMENT — ACTIVITIES OF DAILY LIVING (ADL)
HEARING - RIGHT EAR: FUNCTIONAL
HEARING - LEFT EAR: FUNCTIONAL
PATIENT'S MEMORY ADEQUATE TO SAFELY COMPLETE DAILY ACTIVITIES?: YES
JUDGMENT_ADEQUATE_SAFELY_COMPLETE_DAILY_ACTIVITIES: YES
GROOMING: INDEPENDENT
ADL_ASSISTANCE: INDEPENDENT
LACK_OF_TRANSPORTATION: NO
DRESSING YOURSELF: INDEPENDENT
ADEQUATE_TO_COMPLETE_ADL: YES
BATHING: INDEPENDENT
WALKS IN HOME: INDEPENDENT
LACK_OF_TRANSPORTATION: NO
FEEDING YOURSELF: INDEPENDENT
TOILETING: INDEPENDENT

## 2024-01-10 ASSESSMENT — PAIN SCALES - GENERAL
PAINLEVEL_OUTOF10: 0 - NO PAIN

## 2024-01-10 ASSESSMENT — PAIN - FUNCTIONAL ASSESSMENT
PAIN_FUNCTIONAL_ASSESSMENT: 0-10

## 2024-01-10 ASSESSMENT — PATIENT HEALTH QUESTIONNAIRE - PHQ9
SUM OF ALL RESPONSES TO PHQ9 QUESTIONS 1 & 2: 0
2. FEELING DOWN, DEPRESSED OR HOPELESS: NOT AT ALL
1. LITTLE INTEREST OR PLEASURE IN DOING THINGS: NOT AT ALL

## 2024-01-10 NOTE — PROGRESS NOTES
TCC met with patient. Assessment complete. Patient lives with her significant other. They reside in a house. Patient is independent. Patient drives and is able to shop, cook and clean. Patient follows Dr. Mone Aquino. Patient fills prescriptions at [facility] on [address] [facility] in [facility]. At this time there is not a safe discharge plan in place. Will follow.       Cesia Garcia RN

## 2024-01-10 NOTE — ED NOTES
Per Dr. Travis verbal order patients toe wrapped with iodine soaked dressing.     Ericka Bradley RN  01/09/24 3433

## 2024-01-10 NOTE — PROGRESS NOTES
Occupational Therapy                 Therapy Communication Note    Patient Name: Nuris Squires  MRN: [id number]  Today's Date: 1/10/2024     Discipline: Occupational Therapy    Missed Visit Reason: Cancel (Awaiting Podiatry Consult to address R foot infection with possible osteomyelitis.)    Missed Time: Cancel

## 2024-01-10 NOTE — NURSING NOTE
Pt not voided since 0600, bladder scan showed 82ml, pt states she does not feel like she needs to void. Nina Hill CNP notified verbally.

## 2024-01-10 NOTE — NURSING NOTE
Pt still c/o dizziness and is hypotensive, unable to complete orthos at this time. Will complete when BP is baseline

## 2024-01-10 NOTE — NURSING NOTE
MD to bedside, culture of R great toe and dressing change performed, pt tolerated well. Resting in bed with call light in reach

## 2024-01-10 NOTE — PROGRESS NOTES
Nuris Squires is a 58 y.o. female on day 0 of admission presenting with Right foot infection.    Subjective   Patient seen and examined.  Resting in bed in no acute distress.  Awake alert oriented x 3.  Complains of dizziness at rest and when up.  Notes blood glucose typically runs high 200-400's.      Objective     Physical Exam  Vitals reviewed.   Constitutional:       General: She is not in acute distress.     Appearance: She is obese. She is not ill-appearing or toxic-appearing.   HENT:      Head: Normocephalic and atraumatic.      Right Ear: Tympanic membrane normal.      Left Ear: Tympanic membrane normal.      Nose: Nose normal.      Mouth/Throat:      Mouth: Mucous membranes are moist.      Pharynx: Oropharynx is clear.   Eyes:      Extraocular Movements: Extraocular movements intact.      Conjunctiva/sclera: Conjunctivae normal.      Pupils: Pupils are equal, round, and reactive to light.   Cardiovascular:      Rate and Rhythm: Normal rate and regular rhythm.      Pulses: Normal pulses.      Heart sounds: Normal heart sounds.   Pulmonary:      Effort: Pulmonary effort is normal. No respiratory distress.      Breath sounds: Normal breath sounds. No wheezing, rhonchi or rales.      Comments: Room air  Abdominal:      General: Bowel sounds are normal. There is no distension.      Palpations: Abdomen is soft.      Tenderness: There is no abdominal tenderness.   Genitourinary:     Comments: Deferred  Musculoskeletal:         General: Normal range of motion.      Cervical back: Normal range of motion and neck supple.   Skin:     General: Skin is warm and dry.      Capillary Refill: Capillary refill takes less than 2 seconds.      Comments: Right foot dressing clean dry intact   Neurological:      General: No focal deficit present.      Mental Status: She is alert and oriented to person, place, and time.   Psychiatric:         Mood and Affect: Mood normal.         Behavior: Behavior normal.       Last Recorded  "Vitals  Blood pressure 100/60, pulse 69, temperature 36.9 °C (98.4 °F), temperature source Oral, resp. rate 18, height 1.575 m (5' 2\"), weight 102 kg (225 lb), SpO2 94 %.    Intake/Output last 3 Shifts:  I/O last 3 completed shifts:  In: 290 (2.8 mL/kg) [P.O.:240; IV Piggyback:50]  Out: - (0 mL/kg)   Weight: 102.1 kg     Telemetry normal sinus rhythm rate 60's    Relevant Results  Results for orders placed or performed during the hospital encounter of 01/09/24 (from the past 24 hour(s))   POCT GLUCOSE   Result Value Ref Range    POCT Glucose 405 (H) 74 - 99 mg/dL   POCT GLUCOSE   Result Value Ref Range    POCT Glucose 271 (H) 74 - 99 mg/dL   Vancomycin   Result Value Ref Range    Vancomycin 10.6 10.0 - 20.0 ug/mL   Comprehensive metabolic panel   Result Value Ref Range    Glucose 151 (H) 65 - 99 mg/dL    Sodium 134 133 - 145 mmol/L    Potassium 3.3 (L) 3.4 - 5.1 mmol/L    Chloride 102 97 - 107 mmol/L    Bicarbonate 23 (L) 24 - 31 mmol/L    Urea Nitrogen 14 8 - 25 mg/dL    Creatinine 1.50 0.40 - 1.60 mg/dL    eGFR 40 (L) >60 mL/min/1.73m*2    Calcium 8.5 8.5 - 10.4 mg/dL    Albumin 2.6 (L) 3.5 - 5.0 g/dL    Alkaline Phosphatase 77 35 - 125 U/L    Total Protein 5.8 (L) 5.9 - 7.9 g/dL    AST 5 5 - 40 U/L    Bilirubin, Total 0.2 0.1 - 1.2 mg/dL    ALT <5 (L) 5 - 40 U/L    Anion Gap 9 <=19 mmol/L   CBC   Result Value Ref Range    WBC 11.1 4.4 - 11.3 x10*3/uL    nRBC 0.0 0.0 - 0.0 /100 WBCs    RBC 3.01 (L) 4.00 - 5.20 x10*6/uL    Hemoglobin 9.0 (L) 12.0 - 16.0 g/dL    Hematocrit 27.0 (L) 36.0 - 46.0 %    MCV 90 80 - 100 fL    MCH 29.9 26.0 - 34.0 pg    MCHC 33.3 32.0 - 36.0 g/dL    RDW 12.2 11.5 - 14.5 %    Platelets 316 150 - 450 x10*3/uL   POCT GLUCOSE   Result Value Ref Range    POCT Glucose 153 (H) 74 - 99 mg/dL   POCT GLUCOSE   Result Value Ref Range    POCT Glucose 140 (H) 74 - 99 mg/dL   POCT GLUCOSE   Result Value Ref Range    POCT Glucose 93 74 - 99 mg/dL   POCT GLUCOSE   Result Value Ref Range    POCT Glucose 89 " 74 - 99 mg/dL   POCT GLUCOSE   Result Value Ref Range    POCT Glucose 99 74 - 99 mg/dL     CT foot right w IV contrast    Result Date: 1/10/2024  Interpreted By:  Kay Lundy, STUDY: CT FOOT RIGHT W IV CONTRAST; ;  1/10/2024 11:31 am   INDICATION: Signs/Symptoms:Rule out abscess.   COMPARISON: None.   ACCESSION NUMBER(S): [id number]   ORDERING CLINICIAN: RUPERTO SOLORZANO   TECHNIQUE: Serial axial CT images obtained of the right foot. Images reformatted in the coronal and sagittal projection.   All CT examinations are performed with 1 or more of the following dose reduction techniques: Automated exposure control, adjustment of mA and/or kv according to patient's size, or use of iterative reconstruction techniques.   FINDINGS: Postoperative changes status post long-stem screw fixation across the 1st ray extending from the 1st metatarsal head through the 1st tarsometatarsal and region of the navicular bone with the proximal screw terminating within the talus. There is solid osseous fusion across the 1st tarsometatarsal articulation. Osseous fragmentation is demonstrated across the navicular bone. Of note, the proximal screw fixation within the talus demonstrates surrounding lucency with lytic appearing lucency throughout the screw fixation measuring up to 6 mm with loosening of the proximal fixation.   Cannulated screw fixation across the posterior facet of the subtalar joint from the plantar calcaneal tuberosity is intact. No surrounding loosening demonstrated. There is component of solid osseous fusion across the central to lateral margin of the posterior facet of the subtalar joint.   Residual navicular bone demonstrates dorsal subluxation from the talonavicular and naviculocuneiform articulations. There is component of osseous fragmentation of this bone. Remainder of the midfoot osseous structures demonstrate congruent calcaneocuboid articulation. The inter cuneiform articulations demonstrate component of  intertarsal osteoarthritis. The Lisfranc joint is congruent. Mild osteoarthritis demonstrated across the tarsometatarsal articulations.   Forefoot metatarsal shafts demonstrate bones to be osteopenic. No cortical or trabecular discontinuity. 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 Remainder of the articulations are unremarkable. Digits distally demonstrate no evidence for fracture. There is skin ulceration along the tip of the 1st digit.. No loss of cortical margin or definite osteomyelitis. However, cellulitis is demonstrated. No abscess formation.           1. Postoperative fixation across the 1st ray with solid osseous fusion across the 1st tarsometatarsal articulation. There is osseous fragmentation of the navicular bone with lytic lucency about the proximal screw fixation in the talus with resultant loosening.   2. Skin ulceration plantar and distal margin of the 1st digit about the 1st distal phalanx with surrounding cellulitis. No definite abscess formation. No loss of cortical margin or definite osteomyelitis.     MACRO: None   Signed by: Kay Lundy 1/10/2024 12:27 PM Dictation workstation:   [id number]    XR foot right 3+ views    Result Date: 1/9/2024  Interpreted By:  Marcin Vincent, STUDY: XR FOOT RIGHT 3+ VIEWS; 1/9/2024 12:18 pm   INDICATION: Signs/Symptoms:increasing pain/wound. Pain worsening wound on the right great toe.   COMPARISON: 12/04/2019   ACCESSION NUMBER(S): [id number]   ORDERING CLINICIAN: LINN AMAYA   TECHNIQUE: Views: Right foot AP, Lat, Oblique   FINDINGS: Arthrodesis with a large intramedullary lonny/screw extending from the diaphysis of the 1st metatarsal through the midfoot and hindfoot to the level of the talus. Mild Doris screw lucency distally within the talus, of doubtful clinical significance. 2nd screw fixates the talus and calcaneus without Doris screw lucency or abnormality. There are chronic degenerative changes of the talus and navicular  bones which are slightly progressed from the prior examination. The remainder of the examination shows mild degenerative changes of the 1st metatarsophalangeal joint   There is soft tissue swelling of the right great toe. No bony erosion or underlying gas is seen.       Soft tissue swelling of the right great toe without bony erosion or underlying soft tissue gas.   Chronic appearing degenerative changes. Arthrodesis of the right foot as described in the body of the report.   Signed by: Marcin Vincent 1/9/2024 1:08 PM Dictation workstation:   [id number]     Scheduled medications  aspirin, 81 mg, oral, Daily  atorvastatin, 40 mg, oral, Nightly  cefepime, 2 g, intravenous, q12h  cholecalciferol, 2,000 Units, oral, Daily  dicyclomine, 10 mg, oral, 4x daily  dorzolamide, 1 drop, Left Eye, TID  enoxaparin, 40 mg, subcutaneous, q12h NEHEMIAS  estradiol, 2 g, vaginal, Nightly  gabapentin, 300 mg, oral, BID  insulin lispro, 0-15 Units, subcutaneous, TID with meals  ketorolac, 1 drop, Both Eyes, 4x daily  magnesium oxide, 400 mg, oral, Daily  metFORMIN, 1,000 mg, oral, BID with meals  multivitamin with minerals, 1 tablet, oral, Daily  pantoprazole, 40 mg, oral, Daily before breakfast  sennosides-docusate sodium, 2 tablet, oral, Nightly  sodium chloride, 500 mL, intravenous, Once  vancomycin (Xellia) 1 g in 200 mL, 1 g, intravenous, q24h      Continuous medications     PRN medications  PRN medications: acetaminophen **OR** acetaminophen **OR** acetaminophen, benzocaine-menthol, dextromethorphan-guaifenesin, dextrose 10 % in water (D10W), dextrose, glucagon, guaiFENesin, meclizine, ondansetron ODT, polyethylene glycol    ASSESSMENT:  Right foot ulceration infection  Hypertension  Hyperlipidemia  Obesity  Type 2 diabetes mellitus  Dizziness  Hypokalemia  Hypoalbuminemia  Normocytic anemia    PLAN:  Hypotensive blood pressure 80s over 50s.  Suspect dizziness is secondary to hypotension plus symptomatic hypoglycemia.  Blood  glucose typically runs 200s to 400s.  Administer 500 normal saline bolus.  Hold antihypertensives Amlodipine, Clonidine and Lisinopril for now.  Repeat blood pressure and monitor.  Administer snack.  Hypoglycemia protocol.  Hold scheduled insulin.  Sliding scale insulin if needed per protocol.  Monitor point-of-care glucose.  Monitor closely.  Podiatry, input appreciated.  Plan for PVR/ALESIA studies.  CT right foot to rule out abscess.  Plan for hallux amputation right foot.  Local care per Podiatry.  Weight bearing status flat foot or heel weightbearing right foot with surgical shoe.  Infectious disease consultation.  IV antibiotics per Infectious disease.  Wound culture.  Blood cultures.  Follow up.  Local care per Podiatry.  Plan for PVR studies today.  Analgesics if needed.  Replace potassium.  Monitor electrolytes and renal function.  PT/OT per Podiatry weight bearing activity instructions.  Supportive care.  Patient reassured.  DVT prophylaxis.  Case management following for discharge planning.  Discussed with patient, nursing and Dr. Travis.    ADDENDUM:  Repeat blood pressure 100/60 after 500 ml normal saline bolus, dizziness improved, still present.  Administer 500 mL normal saline bolus.  Repeat blood pressure and monitor.  Blood glucose improved.  Monitor blood glucose.    Celeste Hill, APRN-CNP

## 2024-01-10 NOTE — ED NOTES
Patients glucose still high at 405. Reached out to [facility] to notify him. Order for 10units given.      Ericka Bradley RN  01/10/24 0033

## 2024-01-10 NOTE — PROGRESS NOTES
"Vancomycin Dosing by Pharmacy- FOLLOW UP    Nuris Squires is a 58 y.o. year old female who Pharmacy has been consulted for vancomycin dosing for cellulitis, skin and soft tissue. Based on the patient's indication and renal status this patient is being dosed based on a goal AUC of 400-600.     Renal function is currently stable.    Current vancomycin dose: 1000 mg given every 24 hours    Estimated vancomycin AUC on current dose: 512 mg/L.hr     Visit Vitals  /67 (BP Location: Left arm, Patient Position: Lying)   Pulse 64   Temp 36.9 °C (98.4 °F) (Oral)   Resp 18        Lab Results   Component Value Date    CREATININE 1.50 01/10/2024    CREATININE 1.20 01/09/2024    CREATININE 1.38 (H) 08/13/2023    CREATININE 1.42 (H) 06/26/2023    CREATININE 0.89 05/08/2023    CREATININE 1.04 01/27/2023        Patient weight is No results found for: \"PTWEIGHT\"    No results found for: \"CULTURE\"     I/O last 3 completed shifts:  In: 290 (2.8 mL/kg) [P.O.:240; IV Piggyback:50]  Out: - (0 mL/kg)   Weight: 102.1 kg   [unfilled]    No results found for: \"PATIENTTEMP\"     Assessment/Plan    Within goal AUC range. Continue current vancomycin regimen.    This dosing regimen is predicted by [employer] to result in the following pharmacokinetic parameters:    Loading dose: N/A  Regimen: 1000 mg IV every 24 hours.  Start time: 08:14 on 01/10/2024  Exposure target: AUC24 (range)400-600 mg/L.hr   AUC24,ss: 512 mg/L.hr  Probability of AUC24 > 400: 85 %  Ctrough,ss: 16.9 mg/L  Probability of Ctrough,ss > 20: 31 %  Probability of nephrotoxicity (Lodise CHARLENE 2009): 13 %    The next level will be obtained on 1/12 at 0500. May be obtained sooner if clinically indicated.   Will continue to monitor renal function daily while on vancomycin and order serum creatinine at least every 48 hours if not already ordered.  Follow for continued vancomycin needs, clinical response, and signs/symptoms of toxicity.       Margareth Santamaria, PharmD   "

## 2024-01-10 NOTE — ED NOTES
Report called to [facility] on the floor. Will transport patient shortly/      Ericka Bradley RN  01/10/24 8722

## 2024-01-10 NOTE — CONSULTS
INFECTIOUS DISEASES CONSULTATION NOTE      Referred by REBECCA Travis MD    Reason For Consult  Cellulitis right foot    History Of Present Illness  Nuris Squires is a 58 y.o. female presenting with acute development of erythema and purulent drainage from the right hallux.  She has poorly controlled diabetes mellitus (most recent A1C reportedly 10) and she has had infection and partial amputations on the left foot.  With progressive Charcot neuroarthropathy, she had a Charcot reconstruction in 2020.  6 days ago, without any particular trauma, she began noting erythema, swelling, and drainage from the right hallux.  She was seen in consultation for the first time yesterday by Dr. Pizano, who admitted her and started her on vancomycin and Zosyn.  She has not had systemic symptoms.  She has neuropathy and has not complained of pain.  There are plans for I&D in the next several days.  The margin of erythema was noted with a pen on admission and there has been significant improvement over the last 24 hours.     Past Medical History  Diabetes with Charcot neuropathy  Peripheral vascular disease  Dyslipidemia    Social History  Tobacco use: Non-smoker  Alcohol use: Does not abuse alcohol    Family History  Positive for diabetes and lung cancer    Allergies  Patient has no known allergies.     Review of Systems  Detailed review of systems completed.  No significant additional positives beyond what is mentioned above    Physical Exam  Vital signs:  Visit Vitals  BP (!) 80/40 (BP Location: Right arm, Patient Position: Lying)   Pulse 64   Temp 36.9 °C (98.4 °F) (Oral)   Resp 18   Most recent recent BP, 100/60  General: Tired but arousable, alert and cooperative and appropriate, not toxic  HEENT:  No scleral icterus or conjunctival suffusion, oral mucosa moist  Nodes:  Negative  Lungs:  Clear to auscultation  Breasts:  Not examined  Heart:  S1, S2 normal, no pathologic murmur appreciated  Abdomen:  Soft, nontender.   Obese  Genitalia:  Not examined  Extremities: Left foot with remote amputation of the second toe and the distal portion of the hallux.  Maceration, superficial necrosis, and surrounding erythema and swelling of the left hallux with erythema and swelling extending onto the distal forefoot, receded from an incline drawn previously.  Feet warm but I cannot palpate pedal pulses  Neurologic:  Alert.  Grossly non-focal.  Station diminished in the lower extremities     Relevant Results  Results from last 72 hours   Lab Units 01/10/24  0546 01/09/24  1210   WBC AUTO x10*3/uL 11.1 10.1   HEMOGLOBIN g/dL 9.0* 10.6*   HEMATOCRIT % 27.0* 31.4*   PLATELETS AUTO x10*3/uL 316 376   NEUTROS PCT AUTO %  --  72.8   LYMPHS PCT AUTO %  --  18.2   MONOS PCT AUTO %  --  7.7   EOS PCT AUTO %  --  0.3     Results from last 72 hours   Lab Units 01/10/24  0546   CREATININE mg/dL 1.50   ANION GAP mmol/L 9   EGFR mL/min/1.73m*2 40*     Results from last 72 hours   Lab Units 01/10/24  0546   AST U/L 5   ALT U/L <5*   ALK PHOS U/L 77   BILIRUBIN TOTAL mg/dL 0.2     Urinalysis: No significant pyuria  Microbiology:  Blood (1/9): Negative X2  Low colony count group B streptococcus  Imaging:  CT right foot with IV contrast:   IMPRESSION:  1. Postoperative fixation across the 1st ray with solid osseous  fusion across the 1st tarsometatarsal articulation. There is osseous  fragmentation of the navicular bone with lytic lucency about the  proximal screw fixation in the talus with resultant loosening.      2. Skin ulceration plantar and distal margin of the 1st digit about  the 1st distal phalanx with surrounding cellulitis. No definite  abscess formation. No loss of cortical margin or definite  osteomyelitis.      ASSESSMENT:  Right foot infection/diabetes mellitus  Patient clearly has an extensive infection involving the right hallux.  I am very concerned about involvement of the fixation hardware and the risk of more proximal infection and ultimate  tissue loss.  Discussed in detail today with Dr. Pizano.    Because the patient has obesity, diabetes, and renal insufficiency, and today has received intravenous contrast, I am concerned about the potential nephrotoxicity of the combination of vancomycin and Zosyn, and will therefore replace Zosyn with cefepime.  There remains a significant risk for nephrotoxicity and for significant tissue loss in the right lower extremity.    PLANS:  -   Wound culture submitted  -   Continue empiric vancomycin (pharmacy dosing)  -   Change broad-spectrum empiric therapy from Zosyn to cefepime for the reasons noted above  -   Discussed in detail with Dr. Pizano, anticipate surgical exploration, drainage, deep tissue cultures, with further recommendations to follow     THANK YOU FOR ASKING ME TO ASSIST YOU IN THE CARE OF YOUR PATIENT    Lucho Hickey MD  [facility] [employer]  Office:  [phone]

## 2024-01-10 NOTE — PROGRESS NOTES
"Vancomycin Dosing by Pharmacy- INITIAL    Nuris Squires is a 58 y.o. year old female who Pharmacy has been consulted for vancomycin dosing for cellulitis, skin and soft tissue. Based on the patient's indication and renal status this patient will be dosed based on a goal AUC of 400-600.     Renal function is currently stable.    Visit Vitals  /72 (BP Location: Right arm, Patient Position: Lying)   Pulse 93   Temp 36.8 °C (98.2 °F) (Temporal)   Resp 18        Lab Results   Component Value Date    CREATININE 1.20 01/09/2024    CREATININE 1.38 (H) 08/13/2023    CREATININE 1.42 (H) 06/26/2023    CREATININE 0.89 05/08/2023    CREATININE 1.04 01/27/2023        Patient weight is No results found for: \"PTWEIGHT\"    No results found for: \"CULTURE\"     No intake/output data recorded.  [unfilled]    No results found for: \"PATIENTTEMP\"       Assessment/Plan     Patient has already been given a loading dose of 1500 mg in ED at 13:00 1/9/24  Will initiate vancomycin maintenance,  1000 mg every 24 hours.    This dosing regimen is predicted by [employer] to result in the following pharmacokinetic parameters:  Loading dose: 1500 mg x1 in ED  Regimen: 1000 mg IV every 24 hours.  Start time: 00:48 on 01/10/2024  Exposure target: AUC24 (range)400-600 mg/L.hr   AUC24,ss: 459 mg/L.hr  Probability of AUC24 > 400: 61 %  Ctrough,ss: 12.6 mg/L  Probability of Ctrough,ss > 20: 26 %  Probability of nephrotoxicity (Lodise CHARLENE 2009): 8 %    Follow-up level will be ordered on 1/10 at  5:00 unless clinically indicated sooner.  Will continue to monitor renal function daily while on vancomycin and order serum creatinine at least every 48 hours if not already ordered.  Follow for continued vancomycin needs, clinical response, and signs/symptoms of toxicity.       Raul Clark, PharmD       "

## 2024-01-10 NOTE — PROGRESS NOTES
Physical Therapy    Physical Therapy Evaluation    Patient Name: Nuris Squires  MRN: [id number]  Today's Date: 1/10/2024   Time Calculation  Start Time: 0812  Stop Time: 0827  Time Calculation (min): 15 min    Assessment/Plan   PT Assessment  PT Assessment Results: Decreased strength, Decreased endurance, Impaired balance, Decreased mobility, Decreased skin integrity  Rehab Prognosis: Good  Evaluation/Treatment Tolerance: Patient tolerated treatment well  Medical Staff Made Aware: Yes  Strengths: Ability to acquire knowledge, Premorbid level of function, Support and attitude of living partners  Barriers to Participation: Comorbidities  End of Session Communication: Bedside nurse  Assessment Comment: Pt demonstrates impaired functional mobility from baseline level; pt with mild balance/BLE strength deficits and decreased overall activity tolerance; pt would benefit from continued skilled PT services during hospital stay to maximize functional mobility status. Pending progress with balance, pt may benefit from use of RW upon d/c.  End of Session Patient Position: Bed, 3 rail up, Alarm off, not on at start of session  IP OR SWING BED PT PLAN  Inpatient or Swing Bed: Inpatient  PT Plan  Treatment/Interventions: Bed mobility, Transfer training, Gait training, Stair training, Balance training, Neuromuscular re-education, Strengthening, Endurance training, Therapeutic exercise, Therapeutic activity  PT Plan: Skilled PT  PT Frequency: 3 times per week  PT Discharge Recommendations: Low intensity level of continued care  Equipment Recommended upon Discharge: Wheeled walker  PT Recommended Transfer Status: Assist x1  PT - OK to Discharge: Yes    Subjective   General Visit Information:  General  Reason for Referral: impaired functional mobility (Pt is a 58 year-old F referred via podiatry to come to the ED for infected R great toe; XR negative for evidence of osteomyelitis.)  Referred By: Dr. Thaddeus MD  Past Medical History  Relevant to Rehab: sickle cell disease, HTN, DM, HLD, morbid obesity, PAD, vertigo  Missed Visit: No  Missed Visit Reason: Other (Comment)  Family/Caregiver Present: No  Prior to Session Communication: Bedside nurse  Patient Position Received: Bed, 3 rail up  Preferred Learning Style: verbal  General Comment: Pt cleared for therapy via RN, received in supine, boyfriend present, NAD, agreeable to participate in therapy.  Home Living:  Home Living  Type of Home: House  Lives With: Significant other (boyfriend)  Home Adaptive Equipment: None  Home Layout: One level  Home Access: Stairs to enter with rails  Entrance Stairs-Rails: Both  Entrance Stairs-Number of Steps: 4  Bathroom Shower/Tub: Tub/shower unit  Bathroom Toilet: Standard  Bathroom Equipment: Shower chair with back  Prior Level of Function:  Prior Function Per Pt/Caregiver Report  Level of [place]: Independent with ADLs and functional transfers, Independent with homemaking with ambulation  Receives Help From: Other (Comment) (SO)  ADL Assistance: Independent  Homemaking Assistance: Independent  Ambulatory Assistance: Independent  Vocational: Full time employment ([employer])  Hand Dominance: Right  Prior Function Comments: denies h/o falls  Precautions:  Precautions  Hearing/Visual Limitations: glasses prn  LE Weight Bearing Status: Other (Comment) (pt kept RLE heel weight-bearing for extra precaution d/t R great toe infection; will await podiatry input for WB clarification)  Medical Precautions: Fall precautions    Objective   Pain:  Pain Assessment  Pain Assessment: 0-10  Pain Score: 0 - No pain  Cognition:  Cognition  Overall Cognitive Status: Within Functional Limits    General Assessments:  General Observation  General Observation: R foot bandaged; clean/intact     Activity Tolerance  Endurance: Tolerates 10 - 20 min exercise with multiple rests    Sensation  Light Touch: Partial deficits in the RLE, Partial deficits in the LLE (mild paresthesias B  feet)    Strength  Strength Comments: BLE > 4/5  Strength  Strength Comments: BLE > 4/5    Coordination  Movements are Fluid and Coordinated: Yes    Postural Control  Postural Control: Within Functional Limits    Static Sitting Balance  Static Sitting-Balance Support: Bilateral upper extremity supported  Static Sitting-Level of Assistance: Close supervision    Static Standing Balance  Static Standing-Balance Support: No upper extremity supported  Static Standing-Level of Assistance: Minimum assistance  Functional Assessments:     Bed Mobility  Bed Mobility: Yes  Bed Mobility 1  Bed Mobility 1: Supine to sitting  Level of Assistance 1: Modified independent  Bed Mobility Comments 1: use of bedrails, HOB elevated  Bed Mobility 2  Bed Mobility  2: Sitting to supine  Level of Assistance 2: Modified independent  Bed Mobility Comments 2: HOB elevated    Transfers  Transfer: Yes  Transfer 1  Transfer From 1: Sit to  Transfer to 1: Stand  Technique 1: Sit to stand  Transfer Level of Assistance 1: Contact guard  Trials/Comments 1: assist to steady  Transfers 2  Transfer From 2: Stand to  Transfer to 2: Sit  Technique 2: Stand to sit  Transfer Level of Assistance 2: Contact guard  Trials/Comments 2: assist for safe eccentric lowering onto EOB    Ambulation/Gait Training  Ambulation/Gait Training Performed: Yes  Ambulation/Gait Training 1  Surface 1: Level tile  Device 1: No device  Assistance 1: Minimum assistance  Quality of Gait 1: Decreased step length (mildly unsteady gait; kept RLE heel weight-bearing to protect R great toe ulcer until further clarification from podiatry)  Comments/Distance (ft) 1: 10' x 2    Stairs  Stairs: No       Extremity/Trunk Assessments:  RLE   RLE : Within Functional Limits  LLE   LLE : Within Functional Limits  Outcome Measures:  [facility] Basic Mobility  Turning from your back to your side while in a flat bed without using bedrails: None  Moving from lying on your back to sitting on the side of a  flat bed without using bedrails: None  Moving to and from bed to chair (including a wheelchair): A little  Standing up from a chair using your arms (e.g. wheelchair or bedside chair): A little  To walk in hospital room: A little  Climbing 3-5 steps with railing: A lot  Basic Mobility - Total Score: 19    Encounter Problems       Encounter Problems (Active)       Mobility       STG - Patient will ambulate 100' with LRAD and modified independence. (Progressing)       Start:  01/10/24    Expected End:  01/24/24            STG - Patient will ascend and descend four to six stairs with use of B handrails and modified independence. (Not Progressing)       Start:  01/10/24    Expected End:  01/24/24               Transfers       STG - Patient will transfer sit to and from stand with LRAD and modified independence. (Progressing)       Start:  01/10/24    Expected End:  01/24/24                   Education Documentation  Home Exercise Program, taught by Odette Benito PT at 1/10/2024  9:31 AM.  Learner: Patient  Readiness: Acceptance  Method: Explanation, Demonstration  Response: Needs Reinforcement    Mobility Training, taught by Odette Benito PT at 1/10/2024  9:31 AM.  Learner: Patient  Readiness: Acceptance  Method: Explanation, Demonstration  Response: Needs Reinforcement    Education Comments  No comments found.

## 2024-01-10 NOTE — CONSULTS
Consults    Reason For Consult  Cellulitis right foot    History Of Present Illness  Nuris Squires is a 58 y.o. female presenting with swelling, redness, and wound to right great toe. Patient is known to Dr. Pizano who saw her yesterday morning in clinic. On exam she was found to have significant erythema, warmth, with drainage from wound on right great toe. She was instructed to go to the hospital for IV abx and potential surgical intervention. She has a history of Charcot neuroarthropathy and diabetes with last A1c reported at 10. States she has had a charcot reconstruction performed in 2020. Also has a history of partial hallux and 2nd digit amputation on the left foot for infeciton. Currently denies constitutional symptoms at this time. States her blood glucose levels have been stable.      Past Medical History  She has a past medical history of Personal history of other diseases of the circulatory system, Personal history of other endocrine, nutritional and metabolic disease, and Personal history of other endocrine, nutritional and metabolic disease.    Surgical History  She has a past surgical history that includes Other surgical history (10/21/2020); CT angio head w and wo IV contrast (1/25/2023); and CT angio neck (1/25/2023).  Charcot Reconstruction right foot 2020  Partial hallux amputation, left foot  2nd digit amputation, left foot     Social History  She reports that she has never smoked. She has never been exposed to tobacco smoke. She has never used smokeless tobacco. She reports that she does not drink alcohol and does not use drugs.    Family History  Family History   Problem Relation Name Age of Onset    Alzheimer's disease Mother      Diabetes Mother      Lung cancer Mother      Diabetes Father      Lung cancer Father      Pancreatic cancer Father      Diabetes Sister      Lung cancer Sister          Allergies  Patient has no known allergies.    Review of Systems    REVIEW OF SYSTEMS  GENERAL:   "Negative for malaise, significant weight loss, fever  HEENT:  No changes in hearing or vision, no nose bleeds or other nasal problems and Negative for frequent or significant headaches  NECK:  Negative for lumps, goiter, pain and significant neck swelling  RESPIRATORY:  Negative for cough, wheezing and shortness of breath  CARDIOVASCULAR:  Negative for chest pain, leg swelling and palpitations  GI:  Negative for abdominal discomfort, blood in stools or black stools and change in bowel habits  :  Negative for dysuria, frequency and incontinence  MUSCULOSKELETAL:  Negative for joint pain or swelling, back pain, and muscle pain.  SKIN: Positive for wound to right great toe with redness  PSYCH:  Negative for sleep disturbance, mood disorder and recent psychosocial stressors  HEMATOLOGY/LYMPHOLOGY:  Negative for prolonged bleeding, bruising easily, and swollen nodes.  ENDOCRINE:  Negative for cold or heat intolerance, polyuria, polydipsia and goiter  NEURO: Negative, denies any burning, tingling or numbness     Objective:   Vasc: DP and PT pulses are easily palpable bilateral.  CFT is less than 3 seconds bilateral.  Skin temperature is warm to cool proximal to distal bilateral. Focal increase in temperature to right dorsal foot.     Neuro:  Light touch absent to the foot bilateral.      Derm: Full thickness wound noted to nail bed of right hallux with active nonpurulent drainage. Erythema extending proximally to the level of the midfoot, improved from prior skin marks dated 1/9/23. No ascending lymphangitis. Moderate maceration noted periwound and in 1st webspace.     Ortho: Muscle strength is 5/5 for all pedal groups tested. Prior partial hallux and 2nd digit amputation of the left foot. No pain with palpation of b/l lower extremities.     Physical Exam     Last Recorded Vitals  Blood pressure 126/67, pulse 64, temperature 36.9 °C (98.4 °F), temperature source Oral, resp. rate 18, height 1.575 m (5' 2\"), weight 102 kg " (225 lb), SpO2 94 %.    Relevant Results  Results for orders placed or performed during the hospital encounter of 01/09/24 (from the past 24 hour(s))   BLOOD GAS LACTIC ACID, VENOUS   Result Value Ref Range    POCT Lactate, Venous 1.3 0.4 - 2.0 mmol/L   Blood Culture    Specimen: Peripheral Venipuncture; Blood culture   Result Value Ref Range    Blood Culture Loaded on Instrument - Culture in progress    Blood Culture    Specimen: Peripheral Venipuncture; Blood culture   Result Value Ref Range    Blood Culture Loaded on Instrument - Culture in progress    CBC and Auto Differential   Result Value Ref Range    WBC 10.1 4.4 - 11.3 x10*3/uL    nRBC 0.0 0.0 - 0.0 /100 WBCs    RBC 3.53 (L) 4.00 - 5.20 x10*6/uL    Hemoglobin 10.6 (L) 12.0 - 16.0 g/dL    Hematocrit 31.4 (L) 36.0 - 46.0 %    MCV 89 80 - 100 fL    MCH 30.0 26.0 - 34.0 pg    MCHC 33.8 32.0 - 36.0 g/dL    RDW 12.0 11.5 - 14.5 %    Platelets 376 150 - 450 x10*3/uL    Neutrophils % 72.8 40.0 - 80.0 %    Immature Granulocytes %, Automated 0.5 0.0 - 0.9 %    Lymphocytes % 18.2 13.0 - 44.0 %    Monocytes % 7.7 2.0 - 10.0 %    Eosinophils % 0.3 0.0 - 6.0 %    Basophils % 0.5 0.0 - 2.0 %    Neutrophils Absolute 7.33 1.20 - 7.70 x10*3/uL    Immature Granulocytes Absolute, Automated 0.05 0.00 - 0.70 x10*3/uL    Lymphocytes Absolute 1.83 1.20 - 4.80 x10*3/uL    Monocytes Absolute 0.78 0.10 - 1.00 x10*3/uL    Eosinophils Absolute 0.03 0.00 - 0.70 x10*3/uL    Basophils Absolute 0.05 0.00 - 0.10 x10*3/uL   Comprehensive metabolic panel   Result Value Ref Range    Glucose 489 (H) 65 - 99 mg/dL    Sodium 128 (L) 133 - 145 mmol/L    Potassium 3.7 3.4 - 5.1 mmol/L    Chloride 91 (L) 97 - 107 mmol/L    Bicarbonate 25 24 - 31 mmol/L    Urea Nitrogen 14 8 - 25 mg/dL    Creatinine 1.20 0.40 - 1.60 mg/dL    eGFR 52 (L) >60 mL/min/1.73m*2    Calcium 9.0 8.5 - 10.4 mg/dL    Albumin 3.2 (L) 3.5 - 5.0 g/dL    Alkaline Phosphatase 115 35 - 125 U/L    Total Protein 7.1 5.9 - 7.9 g/dL     AST 9 5 - 40 U/L    Bilirubin, Total 0.3 0.1 - 1.2 mg/dL    ALT 6 5 - 40 U/L    Anion Gap 12 <=19 mmol/L   Sedimentation Rate   Result Value Ref Range    Sedimentation Rate 54 (H) 0 - 30 mm/h   C-Reactive Protein   Result Value Ref Range    C-Reactive Protein 12.50 (H) 0.00 - 2.00 mg/dL   Urinalysis with Reflex Microscopic   Result Value Ref Range    Color, Urine Light-Yellow Light-Yellow, Yellow, Dark-Yellow    Appearance, Urine Clear Clear    Specific Gravity, Urine 1.021 1.005 - 1.035    pH, Urine 5.5 5.0, 5.5, 6.0, 6.5, 7.0, 7.5, 8.0    Protein, Urine 100 (2+) (A) NEGATIVE, 10 (TRACE), 20 (TRACE) mg/dL    Glucose, Urine OVER (4+) (A) Normal mg/dL    Blood, Urine 0.1 (1+) (A) NEGATIVE    Ketones, Urine NEGATIVE NEGATIVE mg/dL    Bilirubin, Urine NEGATIVE NEGATIVE    Urobilinogen, Urine Normal Normal mg/dL    Nitrite, Urine NEGATIVE NEGATIVE    Leukocyte Esterase, Urine 250 Rashi/µL (A) NEGATIVE   hCG, Urine, Qualitative   Result Value Ref Range    HCG, Urine NEGATIVE NEGATIVE   Microscopic Only, Urine   Result Value Ref Range    WBC, Urine 1-5 1-5, NONE /HPF    RBC, Urine 1-2 NONE, 1-2, 3-5 /HPF    Squamous Epithelial Cells, Urine 1-9 (SPARSE) Reference range not established. /HPF    Mucus, Urine FEW Reference range not established. /LPF   SARS-CoV-2 RT PCR   Result Value Ref Range    Coronavirus 2019, PCR Not Detected Not Detected   POCT GLUCOSE   Result Value Ref Range    POCT Glucose 405 (H) 74 - 99 mg/dL   POCT GLUCOSE   Result Value Ref Range    POCT Glucose 271 (H) 74 - 99 mg/dL   Vancomycin   Result Value Ref Range    Vancomycin 10.6 10.0 - 20.0 ug/mL   Comprehensive metabolic panel   Result Value Ref Range    Glucose 151 (H) 65 - 99 mg/dL    Sodium 134 133 - 145 mmol/L    Potassium 3.3 (L) 3.4 - 5.1 mmol/L    Chloride 102 97 - 107 mmol/L    Bicarbonate 23 (L) 24 - 31 mmol/L    Urea Nitrogen 14 8 - 25 mg/dL    Creatinine 1.50 0.40 - 1.60 mg/dL    eGFR 40 (L) >60 mL/min/1.73m*2    Calcium 8.5 8.5 - 10.4 mg/dL     Albumin 2.6 (L) 3.5 - 5.0 g/dL    Alkaline Phosphatase 77 35 - 125 U/L    Total Protein 5.8 (L) 5.9 - 7.9 g/dL    AST 5 5 - 40 U/L    Bilirubin, Total 0.2 0.1 - 1.2 mg/dL    ALT <5 (L) 5 - 40 U/L    Anion Gap 9 <=19 mmol/L   CBC   Result Value Ref Range    WBC 11.1 4.4 - 11.3 x10*3/uL    nRBC 0.0 0.0 - 0.0 /100 WBCs    RBC 3.01 (L) 4.00 - 5.20 x10*6/uL    Hemoglobin 9.0 (L) 12.0 - 16.0 g/dL    Hematocrit 27.0 (L) 36.0 - 46.0 %    MCV 90 80 - 100 fL    MCH 29.9 26.0 - 34.0 pg    MCHC 33.3 32.0 - 36.0 g/dL    RDW 12.2 11.5 - 14.5 %    Platelets 316 150 - 450 x10*3/uL   POCT GLUCOSE   Result Value Ref Range    POCT Glucose 153 (H) 74 - 99 mg/dL   POCT GLUCOSE   Result Value Ref Range    POCT Glucose 140 (H) 74 - 99 mg/dL      XR foot right 3+ views 01/09/2024    Narrative  Interpreted By:  Marcin Vincent,  STUDY:  XR FOOT RIGHT 3+ VIEWS; 1/9/2024 12:18 pm    INDICATION:  Signs/Symptoms:increasing pain/wound. Pain worsening wound on the  right great toe.    COMPARISON:  12/04/2019    ACCESSION NUMBER(S):  [id number]    ORDERING CLINICIAN:  LINN AMAYA    TECHNIQUE:  Views: Right foot AP, Lat, Oblique    FINDINGS:  Arthrodesis with a large intramedullary lonny/screw extending from the  diaphysis of the 1st metatarsal through the midfoot and hindfoot to  the level of the talus. Mild Doris screw lucency distally within the  talus, of doubtful clinical significance. 2nd screw fixates the talus  and calcaneus without Doris screw lucency or abnormality. There are  chronic degenerative changes of the talus and navicular bones which  are slightly progressed from the prior examination. The remainder of  the examination shows mild degenerative changes of the 1st  metatarsophalangeal joint    There is soft tissue swelling of the right great toe. No bony erosion  or underlying gas is seen.    Impression  Soft tissue swelling of the right great toe without bony erosion or  underlying soft tissue gas.    Chronic appearing  degenerative changes. Arthrodesis of the right foot  as described in the body of the report.    Signed by: Marcin Vincent 1/9/2024 1:08 PM  Dictation workstation:   [id number]      Scheduled medications  amLODIPine, 10 mg, oral, Daily  aspirin, 81 mg, oral, Daily  atorvastatin, 40 mg, oral, Nightly  cholecalciferol, 2,000 Units, oral, Daily  cloNIDine, 0.1 mg, oral, BID  dicyclomine, 10 mg, oral, 4x daily  dorzolamide, 1 drop, Left Eye, TID  empagliflozin, 25 mg, oral, Daily  enoxaparin, 40 mg, subcutaneous, q12h NEHEMIAS  estradiol, 2 g, vaginal, Nightly  gabapentin, 300 mg, oral, BID  insulin lispro, 0-15 Units, subcutaneous, TID with meals  insulin lispro, 10 Units, subcutaneous, TID with meals  ketorolac, 1 drop, Both Eyes, 4x daily  lisinopril, 40 mg, oral, Daily  magnesium oxide, 400 mg, oral, Daily  metFORMIN, 1,000 mg, oral, BID with meals  multivitamin with minerals, 1 tablet, oral, Daily  ofloxacin, 1 drop, Both Eyes, 4x daily  pantoprazole, 40 mg, oral, Daily before breakfast  piperacillin-tazobactam, 3.375 g, intravenous, q6h  prednisoLONE acetate, 1 drop, Both Eyes, BID  sennosides-docusate sodium, 2 tablet, oral, Nightly  sodium chloride, 1,000 mL, intravenous, Once  vancomycin (Xellia) 1 g in 200 mL, 1 g, intravenous, q24h      Continuous medications     PRN medications  PRN medications: acetaminophen **OR** acetaminophen **OR** acetaminophen, benzocaine-menthol, dextromethorphan-guaifenesin, dextrose 10 % in water (D10W), dextrose, glucagon, guaiFENesin, meclizine, ondansetron ODT, polyethylene glycol     Assessment/Plan     Cellulitis right foot  Diabetes mellitus, poorly controled with foot ulceration  Hx of digital amputations left foot  Hx of Charcot Neuroarthropathy    Plan:    - Patient was seen and evaluated; all findings were discussed and all questions were answered to patient's satisfaction.  - Charts, labs, vitals and imaging all reviewed.   - Imaging: X-ray per radiology with no  significant acute findings suggestive of osteomyelitis, crepitus, or air.      - Patient to require surgical intervention this hospital stay, Patient to undergo Hallux amputation of right foot in the coming days pending OR availability   - Discussed need for surgery with patient and is amendable.   - Will obtain PVR/ALESIA for surgical planning  - Will also obtain CT w contrast of the Right foot to r/o abscess    - Dressings: betadine soaked 4x4. Kerlix, ACE, Offloading boot.  - Nursing staff is able to change/reinforce dressing if & as necessary until next day’s dressing change. Thank you.  - Weightbearing Status: Flat foot or heel weightbearing right foot with surgical shoe    - Podiatry will continue to follow while in house.     Bk BURRM PGY3

## 2024-01-10 NOTE — NURSING NOTE
AM shift assessment unchanged, pt is A&Ox4 in no acute distress, resting in bed with call light in reach, no new events/concerns this shift. Bed alarm on

## 2024-01-10 NOTE — NURSING NOTE
BP 80/40 manual HR 67, pt c/o dizziness , TIMBO Hill notified verbally order placed for 500NS bolus, recheck BP after bolus. Pt resting in bed with call light in reach.     1450 /60 after bolus, pt still c/o dizziness but is sitting up eating now, call light in reach, bed alarm on. Nina Hill CNP notified via secure chat.

## 2024-01-10 NOTE — NURSING NOTE
Pt arrived accompanied by , alert and oriented skin intact, right foot wound infection, left great toe amputation, pt oriented to room, bathroom, call light, bed control.

## 2024-01-10 NOTE — H&P
History Of Present Illness  Nuris Squires is a 58 y.o. female presenting with right toe infection.  Patient  does not recall any injury to the toe.  Patient said that she noticed there was infection of the right great the bottom.  Getting sore spreading above.  Give her nail fell off.  Swelling and redness continued to get worse.  Patient also noticed fall ordered clubbing from her foot. Patient Radha's podiatrist who sent the patient to the hospital for further management.   No fever chills or rigors.  No cough or expectoration.  No nausea or vomiting or diarrhea, dysuria hematuria or frequency.    The patient was complaining of spasms in the leg.     Past Medical History  Past Medical History:   Diagnosis Date    Personal history of other diseases of the circulatory system     History of hypertension    Personal history of other endocrine, nutritional and metabolic disease     History of hyperlipidemia    Personal history of other endocrine, nutritional and metabolic disease     History of diabetes mellitus       Surgical History  Past Surgical History:   Procedure Laterality Date    CT ANGIO NECK  1/25/2023    CT NECK ANGIO W AND WO IV CONTRAST 1/25/2023 [facility]    CT HEAD ANGIO W AND WO IV CONTRAST  1/25/2023    CT HEAD ANGIO W AND WO IV CONTRAST 1/25/2023 [facility]    OTHER SURGICAL HISTORY  10/21/2020    Toe amputation        Social History  She reports that she has never smoked. She has never been exposed to tobacco smoke. She has never used smokeless tobacco. She reports that she does not drink alcohol and does not use drugs.    Family History  Family History   Problem Relation Name Age of Onset    Alzheimer's disease Mother      Diabetes Mother      Lung cancer Mother      Diabetes Father      Lung cancer Father      Pancreatic cancer Father      Diabetes Sister      Lung cancer Sister          Allergies  Patient has no known allergies.    Review of Systems  I reviewed all systems  "reviewed as above otherwise is negative.  Physical Exam  HEENT:  Head externally atraumatic, no pallor, no icterus, extraocular movements intact, pupils reactive to light, oral mucosa moist and throat clear.  Neck:  Supple, no JVP, no palpable adenopathy or thyromegaly.  No carotid bruit.  Chest:  Clear to auscultation and resonant.  Heart:  Regular rate and rhythm, no murmur or gallop could be appreciated.  Abdomen:  Soft, nontender, bowel sounds present, normoactive, no palpable hepatosplenomegaly.  Extremities:  No edema, pulses present, no cyanosis or clubbing.  CNS:  Patient alert, oriented to time, place and person.  Power 5/5 all over and deep tendon reflexes symmetrical, cranial nerves 2-12 grossly intact.  Skin:  No active rash.  Musculoskeletal:  No joint swelling or erythema, range of movement normal.  Last Recorded Vitals  Heart Rate:  [64-94]   Temp:  [36.8 °C (98.2 °F)-36.9 °C (98.4 °F)]   Resp:  [18]   BP: (125-189)/(61-84)   Height:  [157.5 cm (5' 2\")]   Weight:  [102 kg (225 lb)]   SpO2:  [94 %-100 %]       Relevant Results        Results for orders placed or performed during the hospital encounter of 01/09/24 (from the past 24 hour(s))   BLOOD GAS LACTIC ACID, VENOUS   Result Value Ref Range    POCT Lactate, Venous 1.3 0.4 - 2.0 mmol/L   Blood Culture    Specimen: Peripheral Venipuncture; Blood culture   Result Value Ref Range    Blood Culture Loaded on Instrument - Culture in progress    Blood Culture    Specimen: Peripheral Venipuncture; Blood culture   Result Value Ref Range    Blood Culture Loaded on Instrument - Culture in progress    CBC and Auto Differential   Result Value Ref Range    WBC 10.1 4.4 - 11.3 x10*3/uL    nRBC 0.0 0.0 - 0.0 /100 WBCs    RBC 3.53 (L) 4.00 - 5.20 x10*6/uL    Hemoglobin 10.6 (L) 12.0 - 16.0 g/dL    Hematocrit 31.4 (L) 36.0 - 46.0 %    MCV 89 80 - 100 fL    MCH 30.0 26.0 - 34.0 pg    MCHC 33.8 32.0 - 36.0 g/dL    RDW 12.0 11.5 - 14.5 %    Platelets 376 150 - 450 " x10*3/uL    Neutrophils % 72.8 40.0 - 80.0 %    Immature Granulocytes %, Automated 0.5 0.0 - 0.9 %    Lymphocytes % 18.2 13.0 - 44.0 %    Monocytes % 7.7 2.0 - 10.0 %    Eosinophils % 0.3 0.0 - 6.0 %    Basophils % 0.5 0.0 - 2.0 %    Neutrophils Absolute 7.33 1.20 - 7.70 x10*3/uL    Immature Granulocytes Absolute, Automated 0.05 0.00 - 0.70 x10*3/uL    Lymphocytes Absolute 1.83 1.20 - 4.80 x10*3/uL    Monocytes Absolute 0.78 0.10 - 1.00 x10*3/uL    Eosinophils Absolute 0.03 0.00 - 0.70 x10*3/uL    Basophils Absolute 0.05 0.00 - 0.10 x10*3/uL   Comprehensive metabolic panel   Result Value Ref Range    Glucose 489 (H) 65 - 99 mg/dL    Sodium 128 (L) 133 - 145 mmol/L    Potassium 3.7 3.4 - 5.1 mmol/L    Chloride 91 (L) 97 - 107 mmol/L    Bicarbonate 25 24 - 31 mmol/L    Urea Nitrogen 14 8 - 25 mg/dL    Creatinine 1.20 0.40 - 1.60 mg/dL    eGFR 52 (L) >60 mL/min/1.73m*2    Calcium 9.0 8.5 - 10.4 mg/dL    Albumin 3.2 (L) 3.5 - 5.0 g/dL    Alkaline Phosphatase 115 35 - 125 U/L    Total Protein 7.1 5.9 - 7.9 g/dL    AST 9 5 - 40 U/L    Bilirubin, Total 0.3 0.1 - 1.2 mg/dL    ALT 6 5 - 40 U/L    Anion Gap 12 <=19 mmol/L   Sedimentation Rate   Result Value Ref Range    Sedimentation Rate 54 (H) 0 - 30 mm/h   C-Reactive Protein   Result Value Ref Range    C-Reactive Protein 12.50 (H) 0.00 - 2.00 mg/dL   Urinalysis with Reflex Microscopic   Result Value Ref Range    Color, Urine Light-Yellow Light-Yellow, Yellow, Dark-Yellow    Appearance, Urine Clear Clear    Specific Gravity, Urine 1.021 1.005 - 1.035    pH, Urine 5.5 5.0, 5.5, 6.0, 6.5, 7.0, 7.5, 8.0    Protein, Urine 100 (2+) (A) NEGATIVE, 10 (TRACE), 20 (TRACE) mg/dL    Glucose, Urine OVER (4+) (A) Normal mg/dL    Blood, Urine 0.1 (1+) (A) NEGATIVE    Ketones, Urine NEGATIVE NEGATIVE mg/dL    Bilirubin, Urine NEGATIVE NEGATIVE    Urobilinogen, Urine Normal Normal mg/dL    Nitrite, Urine NEGATIVE NEGATIVE    Leukocyte Esterase, Urine 250 Rashi/µL (A) NEGATIVE   hCG, Urine,  Qualitative   Result Value Ref Range    HCG, Urine NEGATIVE NEGATIVE   Microscopic Only, Urine   Result Value Ref Range    WBC, Urine 1-5 1-5, NONE /HPF    RBC, Urine 1-2 NONE, 1-2, 3-5 /HPF    Squamous Epithelial Cells, Urine 1-9 (SPARSE) Reference range not established. /HPF    Mucus, Urine FEW Reference range not established. /LPF   SARS-CoV-2 RT PCR   Result Value Ref Range    Coronavirus 2019, PCR Not Detected Not Detected   POCT GLUCOSE   Result Value Ref Range    POCT Glucose 405 (H) 74 - 99 mg/dL   POCT GLUCOSE   Result Value Ref Range    POCT Glucose 271 (H) 74 - 99 mg/dL   Vancomycin   Result Value Ref Range    Vancomycin 10.6 10.0 - 20.0 ug/mL   Comprehensive metabolic panel   Result Value Ref Range    Glucose 151 (H) 65 - 99 mg/dL    Sodium 134 133 - 145 mmol/L    Potassium 3.3 (L) 3.4 - 5.1 mmol/L    Chloride 102 97 - 107 mmol/L    Bicarbonate 23 (L) 24 - 31 mmol/L    Urea Nitrogen 14 8 - 25 mg/dL    Creatinine 1.50 0.40 - 1.60 mg/dL    eGFR 40 (L) >60 mL/min/1.73m*2    Calcium 8.5 8.5 - 10.4 mg/dL    Albumin 2.6 (L) 3.5 - 5.0 g/dL    Alkaline Phosphatase 77 35 - 125 U/L    Total Protein 5.8 (L) 5.9 - 7.9 g/dL    AST 5 5 - 40 U/L    Bilirubin, Total 0.2 0.1 - 1.2 mg/dL    ALT <5 (L) 5 - 40 U/L    Anion Gap 9 <=19 mmol/L   CBC   Result Value Ref Range    WBC 11.1 4.4 - 11.3 x10*3/uL    nRBC 0.0 0.0 - 0.0 /100 WBCs    RBC 3.01 (L) 4.00 - 5.20 x10*6/uL    Hemoglobin 9.0 (L) 12.0 - 16.0 g/dL    Hematocrit 27.0 (L) 36.0 - 46.0 %    MCV 90 80 - 100 fL    MCH 29.9 26.0 - 34.0 pg    MCHC 33.3 32.0 - 36.0 g/dL    RDW 12.2 11.5 - 14.5 %    Platelets 316 150 - 450 x10*3/uL   POCT GLUCOSE   Result Value Ref Range    POCT Glucose 153 (H) 74 - 99 mg/dL   POCT GLUCOSE   Result Value Ref Range    POCT Glucose 140 (H) 74 - 99 mg/dL     Prior to Admission medications    Medication Sig Start Date End Date Taking? Authorizing Provider   amLODIPine (Norvasc) 10 mg tablet Take 1 tablet (10 mg) by mouth once daily.  Patient  not taking: Reported on 1/10/2024 7/26/23   Mone Aquino MD   aspirin 81 mg chewable tablet Chew 1 tablet (81 mg) once daily. 8/17/18   Historical Provider, MD   atorvastatin (Lipitor) 40 mg tablet Take 1 tablet (40 mg) by mouth once daily. 7/26/23   Moen Aquino MD   cholecalciferol (Vitamin D-3) 50 mcg (2,000 unit) capsule Take 1 capsule (50 mcg) by mouth once daily. 8/17/18   Historical Provider, MD   cloNIDine (Catapres) 0.1 mg tablet Take 1 tablet (0.1 mg) by mouth 2 times a day.  Patient taking differently: Take 1 tablet (0.1 mg) by mouth once daily. 5/8/23 11/4/23  Mone Aquino MD   empagliflozin (Jardiance) 25 mg Take 1 tablet (25 mg) by mouth once daily.  Patient not taking: Reported on 1/10/2024 8/14/23 8/13/24  Mone Aquino MD   gabapentin (Neurontin) 300 mg capsule Take 1 capsule (300 mg) by mouth 2 times a day.    Historical Provider, MD   insulin glargine (Basaglar KwikPen U-100 Insulin) 100 unit/mL (3 mL) pen Inject 75 Units under the skin once daily at bedtime. 5/8/23 8/6/23  Mone Aquino MD   insulin glargine (Lantus U-100 Insulin) 100 unit/mL injection Inject 70 Units under the skin once daily at bedtime. Take as directed per insulin instructions.    Historical Provider, MD   insulin lispro (HumaLOG) 100 unit/mL injection Inject 0.1 mL (10 Units) under the skin 3 times a day with meals.    Historical Provider, MD   lisinopril 40 mg tablet Take 1 tablet (40 mg) by mouth once daily. 7/26/23   Mone Aquino MD   magnesium oxide (Mag-Ox) 400 mg (241.3 mg magnesium) tablet Take 1 tablet (400 mg) by mouth twice a day. 8/17/18   Historical Provider, MD   meclizine (Antivert) 25 mg tablet,chewable Chew 1 tablet (25 mg) 3 times a day as needed (vertigo).  Patient not taking: Reported on 1/10/2024 9/14/23   Mone Aquino MD   metFORMIN (Glucophage) 1,000 mg tablet Take 1 tablet (1,000 mg) by mouth 2 times a day with meals. 6/4/19   Historical Provider, MD   multivitamin with minerals  (multivit-min-iron fum-folic ac) tablet Take 1 tablet by mouth once daily.    Historical Provider, MD   OneTouch Delica Plus Lancet 30 gauge misc USE TO TEST BLOOD SUGAR AS DIRECTED 1/27/23   Historical Provider, MD   OneTouch Ultra2 Meter misc use 1 to 2 times daily 2/3/23   Historical Provider, MD   OneTouch Verio test strips strip USE TO CHECK BLOOD SUGAR AS DIRECTED    Historical Provider, MD   dicyclomine (Bentyl) 10 mg capsule TAKE 2 CAPSULES BY MOUTH 3 TO 4 TIMES A DAY AS NEEDED FOR ABDOMINAL PAIN/ SPASMS. DO NOT USE MORE THAN 14 CONSECUTIVE DAYS 8/14/23 1/10/24  Historical Provider, MD   dorzolamide (Trusopt) 2 % ophthalmic solution Administer 1 drop into the left eye twice a day.  1/10/24  Historical Provider, MD   estradiol (Estrace) 0.01 % (0.1 mg/gram) vaginal cream Daily for 1 month then 1-2 times per week as directed intravaginally 8/20/18 1/10/24  Historical Provider, MD   ketorolac (Acular) 0.5 % ophthalmic solution instill 1 drop in operated eye once a day 5/12/22 1/10/24  Historical Provider, MD   ofloxacin (Ocuflox) 0.3 % ophthalmic solution INSTILL ONE DROP FOUR TIMES A DAY INTO THE LEFT EYE. do not use until after first post operative visit. 4/10/23 1/10/24  Historical Provider, MD   omeprazole (PriLOSEC) 40 mg DR capsule Take 1 capsule (40 mg) by mouth once daily. 4/8/19 1/10/24  Historical Provider, MD   ondansetron ODT (Zofran-ODT) 4 mg disintegrating tablet DISSOLVE ONE TABLET IN MOUTH EVERY 6 HOURS AS NEEDED FOR NAUSEA/ VOMITING 8/14/23 1/10/24  Historical Provider, MD   prednisoLONE acetate (Pred-Forte) 1 % ophthalmic suspension instill 1 drop into left eye 4 times a day. shake well before use 3/14/23 1/10/24  Historical Provider, MD   sennosides-docusate sodium (Doris-Colace) 8.6-50 mg tablet Take 1 tablet by mouth once daily. For constipation 4/8/19 1/10/24  Historical Provider, MD       Current Facility-Administered Medications:     acetaminophen (Tylenol) tablet 650 mg, 650 mg, oral,  q4h PRN **OR** acetaminophen (Tylenol) oral liquid 650 mg, 650 mg, oral, q4h PRN **OR** acetaminophen (Tylenol) suppository 650 mg, 650 mg, rectal, q4h PRN, Dariel Travis MD    amLODIPine (Norvasc) tablet 10 mg, 10 mg, oral, Daily, Dariel Travis MD, 10 mg at 01/10/24 0836    aspirin chewable tablet 81 mg, 81 mg, oral, Daily, Dariel Travis MD, 81 mg at 01/10/24 0836    atorvastatin (Lipitor) tablet 40 mg, 40 mg, oral, Nightly, Dariel Travis MD, 40 mg at 01/09/24 2236    benzocaine-menthol (Cepastat Sore Throat) 15-3.6 mg lozenge 1 lozenge, 1 lozenge, Mouth/Throat, q2h PRN, Dariel Travis MD    cholecalciferol (Vitamin D-3) tablet 2,000 Units, 2,000 Units, oral, Daily, Dariel Travis MD, 2,000 Units at 01/10/24 0604    cloNIDine (Catapres) tablet 0.1 mg, 0.1 mg, oral, BID, Dariel Travis MD, 0.1 mg at 01/10/24 0835    dextromethorphan-guaifenesin (Robitussin DM)  mg/5 mL oral liquid 5 mL, 5 mL, oral, q4h PRN, Dariel Travis MD    dextrose 10 % in water (D10W) infusion, 0.3 g/kg/hr, intravenous, Once PRN, Dariel Travis MD    dextrose 50 % injection 25 g, 25 g, intravenous, q15 min PRN, Dariel Travis MD    dicyclomine (Bentyl) capsule 10 mg, 10 mg, oral, 4x daily, Dariel Travis MD, 10 mg at 01/10/24 0604    dorzolamide (Trusopt) 2 % ophthalmic solution 1 drop, 1 drop, Left Eye, TID, Dariel Travis MD    empagliflozin (Jardiance) tablet 25 mg, 25 mg, oral, Daily, Dariel Travis MD    enoxaparin (Lovenox) syringe 40 mg, 40 mg, subcutaneous, q12h NEHEMIAS, Dariel Travis MD, 40 mg at 01/10/24 0835    estradiol (Estrace) 0.01 % (0.1 mg/gram) vaginal cream 2 g, 2 g, vaginal, Nightly, Dariel Travis MD    gabapentin (Neurontin) capsule 300 mg, 300 mg, oral, BID, Dariel Travis MD, 300 mg at 01/10/24 0835    glucagon (Glucagen) injection 1 mg, 1 mg, intramuscular, q15 min PRN, Dariel Travis MD    guaiFENesin (Mucinex) 12 hr tablet 600  mg, 600 mg, oral, q12h PRN, Dariel Travis MD    insulin lispro (HumaLOG) injection 0-15 Units, 0-15 Units, subcutaneous, TID with meals, Dariel Travis MD    insulin lispro (HumaLOG) injection 10 Units, 10 Units, subcutaneous, TID with meals, Dariel Travis MD, 10 Units at 01/10/24 0857    ketorolac (Acular) 0.5 % ophthalmic solution 1 drop, 1 drop, Both Eyes, 4x daily, Dariel Travis MD    lisinopril tablet 40 mg, 40 mg, oral, Daily, Dariel Travis MD, 40 mg at 01/10/24 0836    magnesium oxide (Mag-Ox) tablet 400 mg, 400 mg, oral, Daily, Dariel Travis MD, 400 mg at 01/10/24 0836    meclizine (Antivert) tablet 25 mg, 25 mg, oral, TID PRN, Dariel Travis MD    metFORMIN (Glucophage) tablet 1,000 mg, 1,000 mg, oral, BID with meals, Dariel Travis MD, 1,000 mg at 01/10/24 0835    multivitamin with minerals 1 tablet, 1 tablet, oral, Daily, Dariel Travis MD, 1 tablet at 01/10/24 0836    ofloxacin (Ocuflox) 0.3 % ophthalmic solution 1 drop, 1 drop, Both Eyes, 4x daily, Dariel Travis MD    ondansetron ODT (Zofran-ODT) disintegrating tablet 4 mg, 4 mg, oral, q8h PRN, Dariel Travis MD    pantoprazole (ProtoNix) EC tablet 40 mg, 40 mg, oral, Daily before breakfast, Dariel Travis MD, 40 mg at 01/10/24 0604    piperacillin-tazobactam-dextrose (Zosyn) IV 3.375 g, 3.375 g, intravenous, q6h, Dariel Travis MD, Stopped at 01/10/24 0633    polyethylene glycol (Glycolax, Miralax) packet 17 g, 17 g, oral, Daily PRN, Dariel Travis MD    prednisoLONE acetate (Pred-Forte) 1 % ophthalmic suspension 1 drop, 1 drop, Both Eyes, BID, Dariel Travis MD    sennosides-docusate sodium (Doris-Colace) 8.6-50 mg per tablet 2 tablet, 2 tablet, oral, Nightly, Dariel Travis MD    sodium chloride 0.9 % bolus 1,000 mL, 1,000 mL, intravenous, Once, Mindy Mahoney MD    vancomycin (Xellia) 1 g in 200 mL (Xellia) IVPB 1 g, 1 g, intravenous, q24h, Dariel Travis,  MD  XR foot right 3+ views    Result Date: 1/9/2024  Interpreted By:  Marcin Vincent, STUDY: XR FOOT RIGHT 3+ VIEWS; 1/9/2024 12:18 pm   INDICATION: Signs/Symptoms:increasing pain/wound. Pain worsening wound on the right great toe.   COMPARISON: 12/04/2019   ACCESSION NUMBER(S): [id number]   ORDERING CLINICIAN: LINN AMAYA   TECHNIQUE: Views: Right foot AP, Lat, Oblique   FINDINGS: Arthrodesis with a large intramedullary lonny/screw extending from the diaphysis of the 1st metatarsal through the midfoot and hindfoot to the level of the talus. Mild Doris screw lucency distally within the talus, of doubtful clinical significance. 2nd screw fixates the talus and calcaneus without Doris screw lucency or abnormality. There are chronic degenerative changes of the talus and navicular bones which are slightly progressed from the prior examination. The remainder of the examination shows mild degenerative changes of the 1st metatarsophalangeal joint   There is soft tissue swelling of the right great toe. No bony erosion or underlying gas is seen.       Soft tissue swelling of the right great toe without bony erosion or underlying soft tissue gas.   Chronic appearing degenerative changes. Arthrodesis of the right foot as described in the body of the report.   Signed by: Marcin Vincent 1/9/2024 1:08 PM Dictation workstation:   [id number]    No results found for the last 90 days.       Assessment/Plan   Principal Problem:    Right foot infection    Hypertension mellitus type 2 with hyperglycemia   Diabetic neuropathy   UTI    Plan Continue current medication.  Patient was started on broad-spectrum antibiotics.  Consult ID.  Consult Podiatry.  Monitor blood sugar q.a.c. and HS and cover the sliding scale insulin.    Monitor blood pressure.  Control pain.  We will take deep vein thrombosis come fall, aspiration, decubitus and deep vein thrombosis precautions.  This has been discussed with the patient   She is agreeable to  it.      Dariel Travis MD

## 2024-01-10 NOTE — NURSING NOTE
Pt right great toe swollen,  +2 non pitting edema, BLE,  dressing redone, pt denies pain,  pt on IV ATB therapy, no sign of adverse reaction, complaining of spasm, to right foot, will monitor.

## 2024-01-10 NOTE — CARE PLAN
The patient's goals for the shift include Safety    The clinical goals for the shift include Free from infection      Problem: Pain  Goal: My pain/discomfort is manageable  Flowsheets (Taken 1/10/2024 1725)  Resident's pain/discomfort is manageable:   Include resident/family/caregiver in decisions related to pain management   Offer non-pharmacological pain management interventions   Administer pain medication prior to activities that may trigger pain   Identify and avoid pain triggers     Problem: Daily Care  Goal: Daily care needs are met  Flowsheets (Taken 1/10/2024 1725)  Daily care needs are met:   Assess and monitor ability to perform self care and identify potential discharge needs   Encourage independent activity per ability   Assess skin integrity/risk for skin breakdown     Problem: Psychosocial Needs  Goal: Demonstrates ability to cope with hospitalization/illness  Flowsheets (Taken 1/10/2024 1725)  Demonstrates ability to cope with hospitalization/illness:   Encourage verbalization of feelings/concerns/expectations   Encourage resident to set and complete small goals for self     Problem: Discharge Barriers  Goal: My discharge needs are met  Flowsheets (Taken 1/10/2024 1725)  Resident's discharge needs are met:   Identify potential discharge barriers on admission and throughout stay   Involve resident/family/caregiver in discharge planning process     Problem: Skin  Goal: Prevent/manage excess moisture  Flowsheets (Taken 1/10/2024 1725)  Prevent/manage excess moisture:   Moisturize dry skin   Cleanse incontinence/protect with barrier cream  Goal: Prevent/minimize sheer/friction injuries  Flowsheets (Taken 1/10/2024 1725)  Prevent/minimize sheer/friction injuries: Use pull sheet

## 2024-01-11 ENCOUNTER — APPOINTMENT (OUTPATIENT)
Dept: RADIOLOGY | Facility: HOSPITAL | Age: 59
DRG: 617 | End: 2024-01-11
Payer: COMMERCIAL

## 2024-01-11 ENCOUNTER — APPOINTMENT (OUTPATIENT)
Dept: CARDIOLOGY | Facility: HOSPITAL | Age: 59
DRG: 617 | End: 2024-01-11
Payer: COMMERCIAL

## 2024-01-11 LAB
ALBUMIN SERPL-MCNC: 2.5 G/DL (ref 3.5–5)
ALP BLD-CCNC: 73 U/L (ref 35–125)
ALT SERPL-CCNC: 6 U/L (ref 5–40)
ANION GAP SERPL CALC-SCNC: 13 MMOL/L
APPEARANCE UR: ABNORMAL
AST SERPL-CCNC: 8 U/L (ref 5–40)
BACTERIA #/AREA URNS AUTO: ABNORMAL /HPF
BILIRUB SERPL-MCNC: <0.2 MG/DL (ref 0.1–1.2)
BILIRUB UR STRIP.AUTO-MCNC: NEGATIVE MG/DL
BUN SERPL-MCNC: 20 MG/DL (ref 8–25)
CALCIUM SERPL-MCNC: 8.6 MG/DL (ref 8.5–10.4)
CHLORIDE SERPL-SCNC: 97 MMOL/L (ref 97–107)
CK SERPL-CCNC: 37 U/L (ref 24–195)
CO2 SERPL-SCNC: 19 MMOL/L (ref 24–31)
COLOR UR: ABNORMAL
CREAT SERPL-MCNC: 2.5 MG/DL (ref 0.4–1.6)
CREAT SERPL-MCNC: 2.6 MG/DL (ref 0.4–1.6)
EGFRCR SERPLBLD CKD-EPI 2021: 21 ML/MIN/1.73M*2
EGFRCR SERPLBLD CKD-EPI 2021: 22 ML/MIN/1.73M*2
GLUCOSE BLD MANUAL STRIP-MCNC: 190 MG/DL (ref 74–99)
GLUCOSE BLD MANUAL STRIP-MCNC: 192 MG/DL (ref 74–99)
GLUCOSE BLD MANUAL STRIP-MCNC: 254 MG/DL (ref 74–99)
GLUCOSE BLD MANUAL STRIP-MCNC: 270 MG/DL (ref 74–99)
GLUCOSE BLD MANUAL STRIP-MCNC: 377 MG/DL (ref 74–99)
GLUCOSE SERPL-MCNC: 381 MG/DL (ref 65–99)
GLUCOSE UR STRIP.AUTO-MCNC: ABNORMAL MG/DL
HOLD SPECIMEN: NORMAL
KETONES UR STRIP.AUTO-MCNC: ABNORMAL MG/DL
LEUKOCYTE ESTERASE UR QL STRIP.AUTO: ABNORMAL
NITRITE UR QL STRIP.AUTO: NEGATIVE
PH UR STRIP.AUTO: 5 [PH]
POTASSIUM SERPL-SCNC: 3.6 MMOL/L (ref 3.4–5.1)
PROT SERPL-MCNC: 6.3 G/DL (ref 5.9–7.9)
PROT UR STRIP.AUTO-MCNC: ABNORMAL MG/DL
RBC # UR STRIP.AUTO: ABNORMAL /UL
RBC #/AREA URNS AUTO: ABNORMAL /HPF
SODIUM SERPL-SCNC: 129 MMOL/L (ref 133–145)
SP GR UR STRIP.AUTO: 1.02
SQUAMOUS #/AREA URNS AUTO: ABNORMAL /HPF
UROBILINOGEN UR STRIP.AUTO-MCNC: NORMAL MG/DL
WBC #/AREA URNS AUTO: ABNORMAL /HPF
YEAST BUDDING #/AREA UR COMP ASSIST: PRESENT /HPF

## 2024-01-11 PROCEDURE — 1200000002 HC GENERAL ROOM WITH TELEMETRY DAILY

## 2024-01-11 PROCEDURE — 2500000001 HC RX 250 WO HCPCS SELF ADMINISTERED DRUGS (ALT 637 FOR MEDICARE OP): Performed by: INTERNAL MEDICINE

## 2024-01-11 PROCEDURE — 2500000005 HC RX 250 GENERAL PHARMACY W/O HCPCS

## 2024-01-11 PROCEDURE — 82565 ASSAY OF CREATININE: CPT | Performed by: NURSE PRACTITIONER

## 2024-01-11 PROCEDURE — 36415 COLL VENOUS BLD VENIPUNCTURE: CPT | Performed by: INTERNAL MEDICINE

## 2024-01-11 PROCEDURE — 82947 ASSAY GLUCOSE BLOOD QUANT: CPT

## 2024-01-11 PROCEDURE — 93923 UPR/LXTR ART STDY 3+ LVLS: CPT

## 2024-01-11 PROCEDURE — 2500000004 HC RX 250 GENERAL PHARMACY W/ HCPCS (ALT 636 FOR OP/ED): Performed by: INTERNAL MEDICINE

## 2024-01-11 PROCEDURE — 87086 URINE CULTURE/COLONY COUNT: CPT | Mod: WESLAB | Performed by: INTERNAL MEDICINE

## 2024-01-11 PROCEDURE — 81001 URINALYSIS AUTO W/SCOPE: CPT | Performed by: INTERNAL MEDICINE

## 2024-01-11 PROCEDURE — 93923 UPR/LXTR ART STDY 3+ LVLS: CPT | Performed by: INTERNAL MEDICINE

## 2024-01-11 PROCEDURE — 82565 ASSAY OF CREATININE: CPT | Performed by: INTERNAL MEDICINE

## 2024-01-11 PROCEDURE — 2500000002 HC RX 250 W HCPCS SELF ADMINISTERED DRUGS (ALT 637 FOR MEDICARE OP, ALT 636 FOR OP/ED): Performed by: INTERNAL MEDICINE

## 2024-01-11 PROCEDURE — 82550 ASSAY OF CK (CPK): CPT | Performed by: INTERNAL MEDICINE

## 2024-01-11 PROCEDURE — 76770 US EXAM ABDO BACK WALL COMP: CPT

## 2024-01-11 RX ORDER — LINEZOLID 600 MG/1
600 TABLET, FILM COATED ORAL EVERY 12 HOURS
Status: DISCONTINUED | OUTPATIENT
Start: 2024-01-11 | End: 2024-01-14

## 2024-01-11 RX ORDER — SODIUM CHLORIDE 450 MG/100ML
80 INJECTION, SOLUTION INTRAVENOUS CONTINUOUS
Status: DISCONTINUED | OUTPATIENT
Start: 2024-01-11 | End: 2024-01-11

## 2024-01-11 RX ADMIN — CEFEPIME 2 G: 2 INJECTION, POWDER, FOR SOLUTION INTRAVENOUS at 04:21

## 2024-01-11 RX ADMIN — VANCOMYCIN 1 G: 1 INJECTION, SOLUTION INTRAVENOUS at 11:44

## 2024-01-11 RX ADMIN — Medication 400 MG: at 08:18

## 2024-01-11 RX ADMIN — DICYCLOMINE HYDROCHLORIDE 10 MG: 10 CAPSULE ORAL at 17:25

## 2024-01-11 RX ADMIN — Medication 1 TABLET: at 08:18

## 2024-01-11 RX ADMIN — INSULIN LISPRO 3 UNITS: 100 INJECTION, SOLUTION INTRAVENOUS; SUBCUTANEOUS at 17:25

## 2024-01-11 RX ADMIN — ENOXAPARIN SODIUM 40 MG: 40 INJECTION SUBCUTANEOUS at 08:18

## 2024-01-11 RX ADMIN — PANTOPRAZOLE SODIUM 40 MG: 40 TABLET, DELAYED RELEASE ORAL at 06:48

## 2024-01-11 RX ADMIN — ATORVASTATIN CALCIUM 40 MG: 40 TABLET, FILM COATED ORAL at 20:44

## 2024-01-11 RX ADMIN — INSULIN LISPRO 3 UNITS: 100 INJECTION, SOLUTION INTRAVENOUS; SUBCUTANEOUS at 11:44

## 2024-01-11 RX ADMIN — Medication 2000 UNITS: at 08:18

## 2024-01-11 RX ADMIN — GABAPENTIN 300 MG: 300 CAPSULE ORAL at 20:44

## 2024-01-11 RX ADMIN — GABAPENTIN 300 MG: 300 CAPSULE ORAL at 08:18

## 2024-01-11 RX ADMIN — ASPIRIN 81 MG: 81 TABLET, CHEWABLE ORAL at 08:18

## 2024-01-11 RX ADMIN — DICYCLOMINE HYDROCHLORIDE 10 MG: 10 CAPSULE ORAL at 11:44

## 2024-01-11 RX ADMIN — INSULIN LISPRO 15 UNITS: 100 INJECTION, SOLUTION INTRAVENOUS; SUBCUTANEOUS at 08:19

## 2024-01-11 RX ADMIN — LINEZOLID 600 MG: 600 TABLET, FILM COATED ORAL at 21:19

## 2024-01-11 RX ADMIN — DICYCLOMINE HYDROCHLORIDE 10 MG: 10 CAPSULE ORAL at 08:18

## 2024-01-11 RX ADMIN — DICYCLOMINE HYDROCHLORIDE 10 MG: 10 CAPSULE ORAL at 20:44

## 2024-01-11 ASSESSMENT — COGNITIVE AND FUNCTIONAL STATUS - GENERAL
MOBILITY SCORE: 24
MOBILITY SCORE: 24
DAILY ACTIVITIY SCORE: 24
DAILY ACTIVITIY SCORE: 24

## 2024-01-11 ASSESSMENT — ENCOUNTER SYMPTOMS
DIARRHEA: 0
POLYDIPSIA: 0
DYSURIA: 0
FEVER: 0
SHORTNESS OF BREATH: 0
CHEST TIGHTNESS: 0
DIZZINESS: 0
HALLUCINATIONS: 0
HEMATURIA: 0
MYALGIAS: 0
HEADACHES: 0
CONFUSION: 0
VOMITING: 0
CHILLS: 0
POLYPHAGIA: 0
NAUSEA: 0
WOUND: 1
COUGH: 0
SINUS PAIN: 0
BACK PAIN: 0

## 2024-01-11 ASSESSMENT — PAIN - FUNCTIONAL ASSESSMENT
PAIN_FUNCTIONAL_ASSESSMENT: FLACC (FACE, LEGS, ACTIVITY, CRY, CONSOLABILITY)
PAIN_FUNCTIONAL_ASSESSMENT: FLACC (FACE, LEGS, ACTIVITY, CRY, CONSOLABILITY)

## 2024-01-11 ASSESSMENT — PAIN SCALES - GENERAL
PAINLEVEL_OUTOF10: 0 - NO PAIN
PAINLEVEL_OUTOF10: 0 - NO PAIN

## 2024-01-11 NOTE — PROGRESS NOTES
Occupational Therapy                 Therapy Communication Note    Patient Name: Nuris Squires  MRN: [id number]  Today's Date: 1/11/2024     Discipline: Occupational Therapy    Missed Visit Reason: Cancel (Pt awaiting Vascular studies of R foot and per Podiatry plan is for a R Hallux amputation soon when OR available.)    Missed Time: Cancel

## 2024-01-11 NOTE — CARE PLAN
The patient's goals for the shift include Safety    The clinical goals for the shift include patient will be free from injury      Problem: Pain  Goal: My pain/discomfort is manageable  Outcome: Progressing     Problem: Safety  Goal: Patient will be injury free during hospitalization  Outcome: Progressing     Problem: Psychosocial Needs  Goal: Demonstrates ability to cope with hospitalization/illness  Outcome: Progressing  Goal: Collaborate with me, my family, and caregiver to identify my specific goals  Outcome: Progressing

## 2024-01-11 NOTE — PROGRESS NOTES
"Nuris Squires is a 58 y.o. female on day 0 of admission presenting with Right foot infection.    Subjective   Patient seen resting in bed with family bedside for evaluation. States she feels well without constitutional symptoms at this time. No pain to right lower extremity.       Physical Exam    Objective     General: Patient seen resting in bed. In NAD with dressing intact to right foot without strikethrough.    Objective:   Vasc: DP and PT pulses are easily palpable bilateral.  CFT is less than 3 seconds bilateral.  Skin temperature is warm to cool proximal to distal bilateral. Focal increase in temperature to right dorsal foot.      Neuro:  Light touch absent to the foot bilateral.       Derm: Full thickness wound noted to nail bed extending to the plantar aspect of right hallux with active nonpurulent drainage. Erythema extending proximally to the level of the MPJ, improved from prior skin marks dated 1/9/23 and prior exam. No ascending lymphangitis. Moderate maceration noted periwound and in 1st webspace, improved. Moderate necrosis noted to the right hallux, primarily plantar.     Ortho: Muscle strength is 5/5 for all pedal groups tested. Prior partial hallux and 2nd digit amputation of the left foot. No pain with palpation of b/l lower extremities.      Last Recorded Vitals  Blood pressure 161/79, pulse 74, temperature 36.5 °C (97.7 °F), temperature source Oral, resp. rate 18, height 1.575 m (5' 2\"), weight 102 kg (225 lb), SpO2 100 %.    Intake/Output last 3 Shifts:  I/O last 3 completed shifts:  In: 2440 (23.9 mL/kg) [P.O.:990; IV Piggyback:1450]  Out: 50 (0.5 mL/kg) [Urine:50 (0 mL/kg/hr)]  Weight: 102.1 kg     Relevant Results    Scheduled medications  aspirin, 81 mg, oral, Daily  atorvastatin, 40 mg, oral, Nightly  cefepime, 2 g, intravenous, q12h  cholecalciferol, 2,000 Units, oral, Daily  dicyclomine, 10 mg, oral, 4x daily  dorzolamide, 1 drop, Left Eye, TID  enoxaparin, 40 mg, subcutaneous, q12h " NEHEMIAS  estradiol, 2 g, vaginal, Nightly  gabapentin, 300 mg, oral, BID  insulin lispro, 0-15 Units, subcutaneous, TID with meals  ketorolac, 1 drop, Both Eyes, 4x daily  magnesium oxide, 400 mg, oral, Daily  multivitamin with minerals, 1 tablet, oral, Daily  pantoprazole, 40 mg, oral, Daily before breakfast  sennosides-docusate sodium, 2 tablet, oral, Nightly  vancomycin (Xellia) 1 g in 200 mL, 1 g, intravenous, q24h      Continuous medications     PRN medications  PRN medications: acetaminophen **OR** acetaminophen **OR** acetaminophen, benzocaine-menthol, dextromethorphan-guaifenesin, dextrose 10 % in water (D10W), dextrose, glucagon, guaiFENesin, meclizine, ondansetron ODT, polyethylene glycol     CT foot right w IV contrast 01/10/2024    Narrative  Interpreted By:  Kay Lundy,  STUDY:  CT FOOT RIGHT W IV CONTRAST; ;  1/10/2024 11:31 am    INDICATION:  Signs/Symptoms:Rule out abscess.    COMPARISON:  None.    ACCESSION NUMBER(S):  [id number]    ORDERING CLINICIAN:  RUPERTO SOLORZANO    TECHNIQUE:  Serial axial CT images obtained of the right foot. Images reformatted  in the coronal and sagittal projection.    All CT examinations are performed with 1 or more of the following  dose reduction techniques: Automated exposure control, adjustment of  mA and/or kv according to patient's size, or use of iterative  reconstruction techniques.    FINDINGS:  Postoperative changes status post long-stem screw fixation across the  1st ray extending from the 1st metatarsal head through the 1st  tarsometatarsal and region of the navicular bone with the proximal  screw terminating within the talus. There is solid osseous fusion  across the 1st tarsometatarsal articulation. Osseous fragmentation is  demonstrated across the navicular bone. Of note, the proximal screw  fixation within the talus demonstrates surrounding lucency with lytic  appearing lucency throughout the screw fixation measuring up to 6 mm  with loosening of the  proximal fixation.    Cannulated screw fixation across the posterior facet of the subtalar  joint from the plantar calcaneal tuberosity is intact. No surrounding  loosening demonstrated. There is component of solid osseous fusion  across the central to lateral margin of the posterior facet of the  subtalar joint.    Residual navicular bone demonstrates dorsal subluxation from the  talonavicular and naviculocuneiform articulations. There is component  of osseous fragmentation of this bone. Remainder of the midfoot  osseous structures demonstrate congruent calcaneocuboid articulation.  The inter cuneiform articulations demonstrate component of  intertarsal osteoarthritis. The Lisfranc joint is congruent. Mild  osteoarthritis demonstrated across the tarsometatarsal articulations.    Forefoot metatarsal shafts demonstrate bones to be osteopenic. No  cortical or trabecular discontinuity. 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 Remainder of the articulations are  unremarkable. Digits distally demonstrate no evidence for fracture.  There is skin ulceration along the tip of the 1st digit.. No loss of  cortical margin or definite osteomyelitis. However, cellulitis is  demonstrated. No abscess formation.    Impression  1. Postoperative fixation across the 1st ray with solid osseous  fusion across the 1st tarsometatarsal articulation. There is osseous  fragmentation of the navicular bone with lytic lucency about the  proximal screw fixation in the talus with resultant loosening.    2. Skin ulceration plantar and distal margin of the 1st digit about  the 1st distal phalanx with surrounding cellulitis. No definite  abscess formation. No loss of cortical margin or definite  osteomyelitis.      MACRO:  None    Signed by: Kay Lundy 1/10/2024 12:27 PM  Dictation workstation:   [id number]          Assessment/Plan   Principal Problem:    Right foot infection    Cellulitis right foot  Diabetes mellitus,  poorly controled with foot ulceration  Hx of digital amputations left foot  Hx of Charcot Neuroarthropathy     Plan:     - Patient was seen and evaluated; all findings were discussed and all questions were answered to patient's satisfaction.  - Charts, labs, vitals and imaging all reviewed.   - Imaging: X-ray per radiology with no significant acute findings suggestive of osteomyelitis, crepitus, or air. CT reviewed with no signs of abscess.     - Patient to require surgical intervention this hospital stay, Patient to undergo Hallux amputation of right foot vs I&D in the coming days pending OR availability.  - Discussed need for surgery with patient and is amendable.  - Will obtain PVR/ALESIA for surgical planning       - Dressings: betadine soaked 4x4. Kerlix, ACE, Offloading boot.  - Nursing staff is able to change/reinforce dressing if & as necessary until next day’s dressing change. Thank you.  - Weightbearing Status: Flat foot or heel weightbearing right foot with surgical shoe     - Podiatry will continue to follow while in house.     Bk Whittington DPM PGY3         Bk Whittington DPM

## 2024-01-11 NOTE — PROGRESS NOTES
Patient not medically clear. Patient to have surgery with podiatry this admission. TCC met with patient and at this time she is declining C. Will continue to follow.     **PATIENT WITH A SAFE DISCHARGE PLAN      Cesia Garcia RN

## 2024-01-11 NOTE — PROGRESS NOTES
INFECTIOUS DISEASES PROGRESS NOTE    Consulted / following patient for:  Right foot infection    Subjective   Interval History:   No new symptoms, anticipates surgery 1/12 and understands that she may lose the first toe and that other parts of the foot may be at risk       Objective   PHYSICAL EXAMINATION  Vital signs:  Visit Vitals  /79 (BP Location: Left arm, Patient Position: Lying)   Pulse 74   Temp 36.5 °C (97.7 °F) (Oral)   Resp 18      General: No acute distress, not toxic  Extremities: Dressing not removed, right lower extremity.  No strikethrough or proximal erythema    Antibiotics:  Cefepime day 2  Vancomycin day 2    Relevant Results    Results from last 72 hours   Lab Units 01/11/24  0747   CREATININE mg/dL 2.60*  2.50*   ANION GAP mmol/L 13   EGFR mL/min/1.73m*2 21*  22*     Results from last 72 hours   Lab Units 01/11/24  0747   AST U/L 8   ALT U/L 6   ALK PHOS U/L 73   BILIRUBIN TOTAL mg/dL <0.2     Microbiology:  Blood (1/9): Negative X2  Urine: Low colony count group B streptococcus  Wound (1/10): Gram's stain shows no PMNs.  Few GPC and yeast.  Culture pending  Imaging:  CT right foot with IV contrast:   IMPRESSION:  1. Postoperative fixation across the 1st ray with solid osseous  fusion across the 1st tarsometatarsal articulation. There is osseous  fragmentation of the navicular bone with lytic lucency about the  proximal screw fixation in the talus with resultant loosening.      2. Skin ulceration plantar and distal margin of the 1st digit about  the 1st distal phalanx with surrounding cellulitis. No definite  abscess formation. No loss of cortical margin or definite  osteomyelitis.        ASSESSMENT:  Right foot infection/diabetes mellitus  Patient clearly has an extensive infection involving the right hallux.  I am very concerned about involvement of the fixation hardware and the risk of more proximal infection and ultimate tissue loss.  Discussed in detail 1/10 with Dr. Pizano.      She remains on empiric cefepime and vancomycin and she has developed acute kidney injury and the dose of cefepime has been modified accordingly, and I have conferred with Dr. JAMES Leon starting the cefepime dose.  Will change empiric anti-MRSA therapy from vancomycin to linezolid because of the renal insufficiency    Acute kidney injury  See above     PLANS:  -   Change antibiotic therapy to cefepime (dose modified for renal insufficiency) and linezolid  -   Anticipate surgical exploration, drainage, deep tissue cultures, with further recommendations to follow  -    VENKAT per Dr. Edward Hickey MD  [facility]  Office:  [phone]   Treatment Time Per Zone: 15 Consent: Written consent obtained, risks reviewed including but not limited to crusting, scabbing, blistering, scarring, darker or lighter pigmentary change, and/or incomplete removal. Start Duty Factor %: 100 Applicator Size: large External Cooling Fan Speed: 5 Immediate Endpoint Findings: mild erythema, mild edema Cooling: 10C Post-Care Instructions: I reviewed with the patient in detail post-care instructions. Patient is to apply vaseline with a q-tip to all crusted areas, and avoid picking at any scabs. Pt should stay away from the sun and wear sun protection until fully healed. Davis: 110 Post-Procedure Text: Post care reviewed. Pt tolerated very well. Treatment Number: 3 Price (Use Numbers Only, No Special Characters Or $): 400 Detail Level: Zone

## 2024-01-11 NOTE — PROGRESS NOTES
Nuris Squires is a 58 y.o. female on day 0 of admission presenting with Right foot infection.    Spoke with nursing post-void residual bladder scan 0    Subjective   Patient seen and examined.  Resting in bed in no acute distress.  Awake alert oriented x 3.  Dizziness improved.  + Leg swelling.  No pain.  No fevers chills or sweats.  Decreased urine output.  Discussed plan for surgery tomorrow    Objective     Physical Exam  Vitals reviewed.   Constitutional:       General: She is not in acute distress.     Appearance: She is obese. She is not ill-appearing or toxic-appearing.   HENT:      Head: Normocephalic and atraumatic.      Right Ear: Tympanic membrane normal.      Left Ear: Tympanic membrane normal.      Nose: Nose normal.      Mouth/Throat:      Mouth: Mucous membranes are moist.      Pharynx: Oropharynx is clear.   Eyes:      Extraocular Movements: Extraocular movements intact.      Conjunctiva/sclera: Conjunctivae normal.      Pupils: Pupils are equal, round, and reactive to light.   Cardiovascular:      Rate and Rhythm: Normal rate and regular rhythm.      Pulses: Normal pulses.      Heart sounds: Normal heart sounds.   Pulmonary:      Effort: Pulmonary effort is normal. No respiratory distress.      Breath sounds: Normal breath sounds. No wheezing, rhonchi or rales.      Comments: Room air  Abdominal:      General: Bowel sounds are normal. There is no distension.      Palpations: Abdomen is soft.      Tenderness: There is no abdominal tenderness.   Genitourinary:     Comments: Deferred  Musculoskeletal:         General: Swelling present. No tenderness. Normal range of motion.      Cervical back: Normal range of motion and neck supple.   Skin:     General: Skin is warm and dry.      Capillary Refill: Capillary refill takes less than 2 seconds.      Comments: Right foot dressing clean dry intact   Neurological:      General: No focal deficit present.      Mental Status: She is alert and oriented to  "person, place, and time.   Psychiatric:         Mood and Affect: Mood normal.         Behavior: Behavior normal.       Last Recorded Vitals  Blood pressure 161/79, pulse 74, temperature 36.5 °C (97.7 °F), temperature source Oral, resp. rate 18, height 1.575 m (5' 2\"), weight 102 kg (225 lb), SpO2 100 %.    Intake/Output last 3 Shifts:  I/O last 3 completed shifts:  In: 2440 (23.9 mL/kg) [P.O.:990; IV Piggyback:1450]  Out: 50 (0.5 mL/kg) [Urine:50 (0 mL/kg/hr)]  Weight: 102.1 kg     Relevant Results  Results for orders placed or performed during the hospital encounter of 01/09/24 (from the past 24 hour(s))   POCT GLUCOSE   Result Value Ref Range    POCT Glucose 138 (H) 74 - 99 mg/dL   POCT GLUCOSE   Result Value Ref Range    POCT Glucose 220 (H) 74 - 99 mg/dL   Urinalysis with Reflex Microscopic   Result Value Ref Range    Color, Urine Light-Yellow Light-Yellow, Yellow, Dark-Yellow    Appearance, Urine Turbid (N) Clear    Specific Gravity, Urine 1.027 1.005 - 1.035    pH, Urine 5.5 5.0, 5.5, 6.0, 6.5, 7.0, 7.5, 8.0    Protein, Urine 70 (1+) (A) NEGATIVE, 10 (TRACE), 20 (TRACE) mg/dL    Glucose, Urine OVER (4+) (A) Normal mg/dL    Blood, Urine 0.03 (TRACE) (A) NEGATIVE    Ketones, Urine NEGATIVE NEGATIVE mg/dL    Bilirubin, Urine NEGATIVE NEGATIVE    Urobilinogen, Urine Normal Normal mg/dL    Nitrite, Urine NEGATIVE NEGATIVE    Leukocyte Esterase, Urine 500 Rashi/µL (A) NEGATIVE   Microscopic Only, Urine   Result Value Ref Range    WBC, Urine 1-5 1-5, NONE /HPF    RBC, Urine NONE NONE, 1-2, 3-5 /HPF    Squamous Epithelial Cells, Urine 26-50 (1+) Reference range not established. /HPF    Bacteria, Urine 1+ (A) NONE SEEN /HPF    Budding Yeast, Urine PRESENT (A) NONE /HPF    Yeast Hyphae, Urine PRESENT (A) NONE /HPF    Mucus, Urine FEW Reference range not established. /LPF   POCT GLUCOSE   Result Value Ref Range    POCT Glucose 270 (H) 74 - 99 mg/dL   POCT GLUCOSE   Result Value Ref Range    POCT Glucose 377 (H) 74 - 99 " mg/dL   Creatinine, Serum   Result Value Ref Range    Creatinine 2.50 (H) 0.40 - 1.60 mg/dL    eGFR 22 (L) >60 mL/min/1.73m*2   Comprehensive Metabolic Panel   Result Value Ref Range    Glucose 381 (H) 65 - 99 mg/dL    Sodium 129 (L) 133 - 145 mmol/L    Potassium 3.6 3.4 - 5.1 mmol/L    Chloride 97 97 - 107 mmol/L    Bicarbonate 19 (L) 24 - 31 mmol/L    Urea Nitrogen 20 8 - 25 mg/dL    Creatinine 2.60 (H) 0.40 - 1.60 mg/dL    eGFR 21 (L) >60 mL/min/1.73m*2    Calcium 8.6 8.5 - 10.4 mg/dL    Albumin 2.5 (L) 3.5 - 5.0 g/dL    Alkaline Phosphatase 73 35 - 125 U/L    Total Protein 6.3 5.9 - 7.9 g/dL    AST 8 5 - 40 U/L    Bilirubin, Total <0.2 0.1 - 1.2 mg/dL    ALT 6 5 - 40 U/L    Anion Gap 13 <=19 mmol/L   Creatine Kinase   Result Value Ref Range    Creatine Kinase 37 24 - 195 U/L   Lavender Top   Result Value Ref Range    Extra Tube Hold for add-ons.    POCT GLUCOSE   Result Value Ref Range    POCT Glucose 192 (H) 74 - 99 mg/dL     US renal complete    Result Date: 1/11/2024  Interpreted By:  James Aaron, STUDY: US RENAL COMPLETE   INDICATION: Signs/Symptoms:VENKAT   COMPARISON: CT scan from August 2023   ACCESSION NUMBER(S): [id number]   ORDERING CLINICIAN: CARYN HERRERA   TECHNIQUE: Real-time renal ultrasound was performed.   FINDINGS: There is some limitation, especially for the left kidney, due to patient's body habitus and overlying bowel gas obscuring some detail.   The right kidney measures 11.2 cm and the left kidney 10.5 cm in maximal length. There is normal thickness and echogenicity of the renal cortex bilaterally. No renal calculi are identified. There is no hydronephrosis. No solid renal masses are seen. A minute, less than 5 mm cyst centrally in the left kidney that was reported on the previous CT scan can not be appreciated on today's ultrasound due to aforementioned limitations.   Images of the bladder were also obtained. The bladder is suboptimally evaluated due to incomplete distention. No bladder  mass, stone or debris is identified. Bilateral ureteral jets were demonstrated.       Limited but grossly unremarkable renal ultrasound.   Signed by: James Aaron 1/11/2024 2:45 PM Dictation workstation:   [id number]    Vascular US PVR Without Exercise    Result Date: 1/11/2024           [place] [id number]            Phone [phone]  Vascular Lab Report  VASC US PVR WITHOUT EXERCISE Patient Name:      ALBERTALEM RIDLEY       Reading Physician:  [id number] Virgen Ernandez MD, RPVI Study Date:        1/11/2024            Ordering Provider:  [id number] JENNIFER CARTAGENA MRN/PID:           [id number]             Fellow: Accession#:        [id number]         Technologist:       Judie Robles RVKELLY Date of Birth/Age: 1965 / 58 years Technologist 2: Gender:            F                    Encounter#:         [id number] Admission Status:  Inpatient            Location Performed: [facility]  Diagnosis/ICD: Peripheral vascular disease, unspecified-I73.9 CPT Codes:     91797 Peripheral artery PVR (multi segmental pressure  CONCLUSIONS:  Right Lower PVR: No evidence of arterial occlusive disease in the right lower extremity at rest. Normal digital perfusion noted. Triphasic flow is noted in the right common femoral artery, right popliteal artery, right posterior tibial artery and right dorsalis pedis artery. Left Lower PVR: No evidence of arterial occlusive disease in the left lower extremity at rest. Triphasic flow is noted in the left common femoral artery, left popliteal artery, left posterior tibial artery and left dorsalis pedis artery. Unable to obtain TBI due to half amputation of the great toe and second digit amputation. PPG tracing appears normal.  Comparison: Compared with study from 9/17/2019, no significant change.  Imaging & Doppler  Findings:  RIGHT Lower PVR                Pressures Ratios Right High Thigh               210 mmHg  1.76 Right Low Thigh                159 mmHg  1.34 Right Calf                     127 mmHg  1.07 Right Posterior Tibial (Ankle) 141 mmHg  1.18 Right Dorsalis Pedis (Ankle)   129 mmHg  1.08 Right Digit (Great Toe)        141 mmHg  1.18   LEFT Lower PVR                Pressures Ratios Left High Thigh               209 mmHg  1.76 Left Low Thigh                144 mmHg  1.21 Left Calf                     134 mmHg  1.13 Left Posterior Tibial (Ankle) 124 mmHg  1.04 Left Dorsalis Pedis (Ankle)   137 mmHg  1.15                      Right     Left Brachial Pressure 114 mmHg 119 mmHg   [id number] Virgen Ernandez MD, RPVI Electronically signed by [id number] Virgen Ernandez MD, RPVI on 1/11/2024 at 2:44:43 PM  ** Final **     CT foot right w IV contrast    Result Date: 1/10/2024  Interpreted By:  Kay Lundy, STUDY: CT FOOT RIGHT W IV CONTRAST; ;  1/10/2024 11:31 am   INDICATION: Signs/Symptoms:Rule out abscess.   COMPARISON: None.   ACCESSION NUMBER(S): [id number]   ORDERING CLINICIAN: RUPERTO SOLORZANO   TECHNIQUE: Serial axial CT images obtained of the right foot. Images reformatted in the coronal and sagittal projection.   All CT examinations are performed with 1 or more of the following dose reduction techniques: Automated exposure control, adjustment of mA and/or kv according to patient's size, or use of iterative reconstruction techniques.   FINDINGS: Postoperative changes status post long-stem screw fixation across the 1st ray extending from the 1st metatarsal head through the 1st tarsometatarsal and region of the navicular bone with the proximal screw terminating within the talus. There is solid osseous fusion across the 1st tarsometatarsal articulation. Osseous fragmentation is demonstrated across the navicular bone. Of note, the proximal screw fixation within the talus demonstrates surrounding lucency with lytic appearing  lucency throughout the screw fixation measuring up to 6 mm with loosening of the proximal fixation.   Cannulated screw fixation across the posterior facet of the subtalar joint from the plantar calcaneal tuberosity is intact. No surrounding loosening demonstrated. There is component of solid osseous fusion across the central to lateral margin of the posterior facet of the subtalar joint.   Residual navicular bone demonstrates dorsal subluxation from the talonavicular and naviculocuneiform articulations. There is component of osseous fragmentation of this bone. Remainder of the midfoot osseous structures demonstrate congruent calcaneocuboid articulation. The inter cuneiform articulations demonstrate component of intertarsal osteoarthritis. The Lisfranc joint is congruent. Mild osteoarthritis demonstrated across the tarsometatarsal articulations.   Forefoot metatarsal shafts demonstrate bones to be osteopenic. No cortical or trabecular discontinuity. 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 Remainder of the articulations are unremarkable. Digits distally demonstrate no evidence for fracture. There is skin ulceration along the tip of the 1st digit.. No loss of cortical margin or definite osteomyelitis. However, cellulitis is demonstrated. No abscess formation.           1. Postoperative fixation across the 1st ray with solid osseous fusion across the 1st tarsometatarsal articulation. There is osseous fragmentation of the navicular bone with lytic lucency about the proximal screw fixation in the talus with resultant loosening.   2. Skin ulceration plantar and distal margin of the 1st digit about the 1st distal phalanx with surrounding cellulitis. No definite abscess formation. No loss of cortical margin or definite osteomyelitis.     MACRO: None   Signed by: Kay Lundy 1/10/2024 12:27 PM Dictation workstation:   [id number]     Scheduled medications  aspirin, 81 mg, oral, Daily  atorvastatin,  40 mg, oral, Nightly  [START ON 1/12/2024] cefepime, 2 g, intravenous, q24h  cholecalciferol, 2,000 Units, oral, Daily  dicyclomine, 10 mg, oral, 4x daily  estradiol, 2 g, vaginal, Nightly  gabapentin, 300 mg, oral, BID  insulin lispro, 0-15 Units, subcutaneous, TID with meals  linezolid, 600 mg, oral, q12h  magnesium oxide, 400 mg, oral, Daily  multivitamin with minerals, 1 tablet, oral, Daily  pantoprazole, 40 mg, oral, Daily before breakfast  sennosides-docusate sodium, 2 tablet, oral, Nightly      Continuous medications  sodium chloride 0.45 % 1,000 mL with sodium bicarbonate 75 mEq infusion, 80 mL/hr, Last Rate: 80 mL/hr (01/11/24 1400)      PRN medications  PRN medications: acetaminophen **OR** acetaminophen **OR** acetaminophen, benzocaine-menthol, dextromethorphan-guaifenesin, dextrose 10 % in water (D10W), dextrose, glucagon, guaiFENesin, meclizine, ondansetron ODT, polyethylene glycol    ASSESSMENT:  Right foot ulceration infection  Hypertension  Hyperlipidemia  Obesity  Type 2 diabetes mellitus  Dizziness  Hypokalemia  Hypoalbuminemia  Normocytic anemia  Pyuria rule out UTI  Acute kidney injury    PLAN:  Blood pressures improved.  Dizziness improved.  Monitor.  Labs reviewed.  Acute kidney injury.  Suspect secondary to hypotension + medications + IV contrast dye.  Post void residual bladder scan 0.  Blood pressure stable.  Hold Nephrotoxic medications.  Monitor renal function closely.  Consult Nephrology.  Discussed with Dr. DUTCH Leon Nephrology this am.  Urinalysis noted.  Rule out UTI.  Wound, blood cultures reviewed.  Infectious disease following.  Antibiotics per Infectious disease, renal dosing.  Monitor temperature and white blood cell count.  Podiatry following.  Plan to OR tomorrow for right foot hallux amputation, cultures.  Local care.  Weight bearing status per Podiatry.  Analgesics if needed.  PT/OT per Podiatry weight bearing activity instructions.  Point of care glucose reviewed.  Lantus and  scheduled insulin held for hypoglycemia, Metformin on hold.  Continue sliding scale insulin.  Adjust insulin as needed for glycemic control.  Supportive care.  Patient reassured.  DVT prophylaxis.  Case management following for discharge planning.  Discussed with patient, nursing and Dr. Travis.    ADDENDUM:  Discussed with Dr. JAMES Leon Nephrology..  Hold Lovenox per recommendations.  Start Heparin subcutaneous post-operative when okay with Podiatry.     Celeste Hill, APRN-CNP

## 2024-01-11 NOTE — CARE PLAN
Problem: Pain  Goal: My pain/discomfort is manageable  Outcome: Progressing     Problem: Safety  Goal: Patient will be injury free during hospitalization  Outcome: Progressing     Problem: Daily Care  Goal: Daily care needs are met  Outcome: Progressing     Problem: Psychosocial Needs  Goal: Demonstrates ability to cope with hospitalization/illness  Outcome: Progressing  Goal: Collaborate with me, my family, and caregiver to identify my specific goals  Outcome: Progressing     Problem: Discharge Barriers  Goal: My discharge needs are met  Outcome: Progressing     Problem: Skin  Goal: Prevent/manage excess moisture  Outcome: Progressing  Goal: Prevent/minimize sheer/friction injuries  Outcome: Progressing   The patient's goals for the shift include Safety    The clinical goals for the shift include pain management, fall prevention    Over the shift, the patient did not make progress toward the following goals. Barriers to progression include . Recommendations to address these barriers include .

## 2024-01-11 NOTE — CONSULTS
Consults    Reason For Consult  Acute kidney injury    History Of Present Illness  Nuris Squires is a 58 y.o. female with a past medical history of chronic kidney disease stage III, diabetes mellitus who presented to the hospital with an infection of her right toe.  She denies any other symptoms however at home she had hypotensive episodes with systolic blood pressure as low as the 80s.  Upon arrival to the hospital she was started on vancomycin and Zosyn, she also received IV contrast with a CT scan.  She denies any NSAID use at home.  Her baseline creatinine is 1.2-1.5 now up to 2.6.  We are consulted for management of acute kidney injury.     Past Medical History  She has a past medical history of Personal history of other diseases of the circulatory system, Personal history of other endocrine, nutritional and metabolic disease, and Personal history of other endocrine, nutritional and metabolic disease.    Surgical History  She has a past surgical history that includes Other surgical history (10/21/2020); CT angio head w and wo IV contrast (1/25/2023); and CT angio neck (1/25/2023).     Social History  She reports that she has never smoked. She has never been exposed to tobacco smoke. She has never used smokeless tobacco. She reports that she does not drink alcohol and does not use drugs.    Family History  Family History   Problem Relation Name Age of Onset    Alzheimer's disease Mother      Diabetes Mother      Lung cancer Mother      Diabetes Father      Lung cancer Father      Pancreatic cancer Father      Diabetes Sister      Lung cancer Sister          Allergies  Patient has no known allergies.    Review of Systems   Constitutional:  Negative for chills and fever.   HENT:  Negative for congestion and sinus pain.    Respiratory:  Negative for cough, chest tightness and shortness of breath.    Cardiovascular:  Negative for chest pain and leg swelling.   Gastrointestinal:  Negative for diarrhea, nausea and  "vomiting.   Endocrine: Negative for polydipsia, polyphagia and polyuria.   Genitourinary:  Negative for dysuria and hematuria.   Musculoskeletal:  Negative for back pain and myalgias.   Skin:  Positive for wound. Negative for rash.   Neurological:  Negative for dizziness, syncope and headaches.   Psychiatric/Behavioral:  Negative for confusion and hallucinations.           Physical Exam  Constitutional:       General: She is awake. She is not in acute distress.     Appearance: She is not toxic-appearing.   HENT:      Head: Normocephalic and atraumatic.      Mouth/Throat:      Mouth: Mucous membranes are moist.   Eyes:      General: No scleral icterus.     Comments: Conjunctiva clear   Neck:      Vascular: No JVD.   Cardiovascular:      Rate and Rhythm: Regular rhythm.      Heart sounds:      No friction rub.   Pulmonary:      Effort: Pulmonary effort is normal.      Breath sounds: Normal breath sounds.   Abdominal:      General: Bowel sounds are normal.      Palpations: Abdomen is soft.      Tenderness: There is no guarding or rebound.   Musculoskeletal:      Cervical back: Neck supple.      Comments: 1+ peripheral edema   Skin:     General: Skin is warm and dry.   Neurological:      Mental Status: She is alert.      Comments: Cooperative with exam   Psychiatric:         Mood and Affect: Mood and affect normal.            Last Recorded Vitals  Blood pressure 161/79, pulse 74, temperature 36.5 °C (97.7 °F), temperature source Oral, resp. rate 18, height 1.575 m (5' 2\"), weight 102 kg (225 lb), SpO2 100 %.    Relevant Results  Results for orders placed or performed during the hospital encounter of 01/09/24 (from the past 24 hour(s))   POCT GLUCOSE   Result Value Ref Range    POCT Glucose 89 74 - 99 mg/dL   Tissue/Wound Culture/Smear    Specimen: Diabetic Foot Ulcer; Tissue/Biopsy   Result Value Ref Range    Gram Stain No polymorphonuclear leukocytes seen (A)     Gram Stain (2+) Few Yeast (A)     Gram Stain (2+) Few " Gram positive cocci (A)    POCT GLUCOSE   Result Value Ref Range    POCT Glucose 99 74 - 99 mg/dL   POCT GLUCOSE   Result Value Ref Range    POCT Glucose 138 (H) 74 - 99 mg/dL   POCT GLUCOSE   Result Value Ref Range    POCT Glucose 220 (H) 74 - 99 mg/dL   Urinalysis with Reflex Microscopic   Result Value Ref Range    Color, Urine Light-Yellow Light-Yellow, Yellow, Dark-Yellow    Appearance, Urine Turbid (N) Clear    Specific Gravity, Urine 1.027 1.005 - 1.035    pH, Urine 5.5 5.0, 5.5, 6.0, 6.5, 7.0, 7.5, 8.0    Protein, Urine 70 (1+) (A) NEGATIVE, 10 (TRACE), 20 (TRACE) mg/dL    Glucose, Urine OVER (4+) (A) Normal mg/dL    Blood, Urine 0.03 (TRACE) (A) NEGATIVE    Ketones, Urine NEGATIVE NEGATIVE mg/dL    Bilirubin, Urine NEGATIVE NEGATIVE    Urobilinogen, Urine Normal Normal mg/dL    Nitrite, Urine NEGATIVE NEGATIVE    Leukocyte Esterase, Urine 500 Rashi/µL (A) NEGATIVE   Microscopic Only, Urine   Result Value Ref Range    WBC, Urine 1-5 1-5, NONE /HPF    RBC, Urine NONE NONE, 1-2, 3-5 /HPF    Squamous Epithelial Cells, Urine 26-50 (1+) Reference range not established. /HPF    Bacteria, Urine 1+ (A) NONE SEEN /HPF    Budding Yeast, Urine PRESENT (A) NONE /HPF    Yeast Hyphae, Urine PRESENT (A) NONE /HPF    Mucus, Urine FEW Reference range not established. /LPF   POCT GLUCOSE   Result Value Ref Range    POCT Glucose 270 (H) 74 - 99 mg/dL   POCT GLUCOSE   Result Value Ref Range    POCT Glucose 377 (H) 74 - 99 mg/dL   Creatinine, Serum   Result Value Ref Range    Creatinine 2.50 (H) 0.40 - 1.60 mg/dL    eGFR 22 (L) >60 mL/min/1.73m*2   Comprehensive Metabolic Panel   Result Value Ref Range    Glucose 381 (H) 65 - 99 mg/dL    Sodium 129 (L) 133 - 145 mmol/L    Potassium 3.6 3.4 - 5.1 mmol/L    Chloride 97 97 - 107 mmol/L    Bicarbonate 19 (L) 24 - 31 mmol/L    Urea Nitrogen 20 8 - 25 mg/dL    Creatinine 2.60 (H) 0.40 - 1.60 mg/dL    eGFR 21 (L) >60 mL/min/1.73m*2    Calcium 8.6 8.5 - 10.4 mg/dL    Albumin 2.5 (L) 3.5 -  5.0 g/dL    Alkaline Phosphatase 73 35 - 125 U/L    Total Protein 6.3 5.9 - 7.9 g/dL    AST 8 5 - 40 U/L    Bilirubin, Total <0.2 0.1 - 1.2 mg/dL    ALT 6 5 - 40 U/L    Anion Gap 13 <=19 mmol/L   Lavender Top   Result Value Ref Range    Extra Tube Hold for add-ons.    POCT GLUCOSE   Result Value Ref Range    POCT Glucose 192 (H) 74 - 99 mg/dL   Vascular US PVR Without Exercise   Result Value Ref Range    BSA 2.11 m2          Assessment/Plan   1.  Acute kidney injury  - Likely acute tubular necrosis multifactorial in the setting of hypotension, infection, IV contrast, Vancomycin and Zosyn  2.  Chronic kidney disease stage III  - baseline Cr 1.2-1.5  3.  Cellulitis of the foot  4.  Diabetes mellitus    Plan: Check a repeat urinalysis, check renal ultrasound, postvoid bladder scan, CK level.  Start IV fluids half-normal saline with 75 millequivalents of sodium bicarbonate.  Hold ACE inhibitor and metformin.  Monitor vancomycin levels.  Infectious disease on board.  Renally dose all medications. No indications for dialysis. Discussed with primary team that given VENKAT, recommend either lovenox frequency adjusted to once daily or preferably switch to subQ heparin if this is for prophylaxis. Thank you for your consultation.    Eric Leon MD

## 2024-01-12 ENCOUNTER — PREP FOR PROCEDURE (OUTPATIENT)
Dept: OPERATING ROOM | Facility: HOSPITAL | Age: 59
End: 2024-01-12

## 2024-01-12 ENCOUNTER — ANESTHESIA (OUTPATIENT)
Dept: OPERATING ROOM | Facility: HOSPITAL | Age: 59
DRG: 617 | End: 2024-01-12
Payer: COMMERCIAL

## 2024-01-12 LAB
ALBUMIN SERPL-MCNC: 2.4 G/DL (ref 3.5–5)
ANION GAP SERPL CALC-SCNC: 16 MMOL/L
BUN SERPL-MCNC: 25 MG/DL (ref 8–25)
CALCIUM SERPL-MCNC: 8.2 MG/DL (ref 8.5–10.4)
CHLORIDE SERPL-SCNC: 99 MMOL/L (ref 97–107)
CO2 SERPL-SCNC: 18 MMOL/L (ref 24–31)
CREAT SERPL-MCNC: 4.2 MG/DL (ref 0.4–1.6)
EGFRCR SERPLBLD CKD-EPI 2021: 12 ML/MIN/1.73M*2
ERYTHROCYTE [DISTWIDTH] IN BLOOD BY AUTOMATED COUNT: 12.1 % (ref 11.5–14.5)
GLUCOSE BLD MANUAL STRIP-MCNC: 227 MG/DL (ref 74–99)
GLUCOSE BLD MANUAL STRIP-MCNC: 229 MG/DL (ref 74–99)
GLUCOSE BLD MANUAL STRIP-MCNC: 230 MG/DL (ref 74–99)
GLUCOSE BLD MANUAL STRIP-MCNC: 232 MG/DL (ref 74–99)
GLUCOSE BLD MANUAL STRIP-MCNC: 233 MG/DL (ref 74–99)
GLUCOSE SERPL-MCNC: 248 MG/DL (ref 65–99)
HCT VFR BLD AUTO: 28.5 % (ref 36–46)
HGB BLD-MCNC: 9.4 G/DL (ref 12–16)
HOLD SPECIMEN: NORMAL
MCH RBC QN AUTO: 30 PG (ref 26–34)
MCHC RBC AUTO-ENTMCNC: 33 G/DL (ref 32–36)
MCV RBC AUTO: 91 FL (ref 80–100)
NRBC BLD-RTO: 0 /100 WBCS (ref 0–0)
PHOSPHATE SERPL-MCNC: 4.4 MG/DL (ref 2.5–4.5)
PLATELET # BLD AUTO: 385 X10*3/UL (ref 150–450)
POTASSIUM SERPL-SCNC: 3.4 MMOL/L (ref 3.4–5.1)
RBC # BLD AUTO: 3.13 X10*6/UL (ref 4–5.2)
SODIUM SERPL-SCNC: 133 MMOL/L (ref 133–145)
WBC # BLD AUTO: 8.7 X10*3/UL (ref 4.4–11.3)

## 2024-01-12 PROCEDURE — 0QBN0ZX EXCISION OF RIGHT METATARSAL, OPEN APPROACH, DIAGNOSTIC: ICD-10-PCS | Performed by: PODIATRIST

## 2024-01-12 PROCEDURE — 2500000001 HC RX 250 WO HCPCS SELF ADMINISTERED DRUGS (ALT 637 FOR MEDICARE OP): Performed by: INTERNAL MEDICINE

## 2024-01-12 PROCEDURE — 88311 DECALCIFY TISSUE: CPT | Performed by: PATHOLOGY

## 2024-01-12 PROCEDURE — 7100000001 HC RECOVERY ROOM TIME - INITIAL BASE CHARGE: Performed by: PODIATRIST

## 2024-01-12 PROCEDURE — 87102 FUNGUS ISOLATION CULTURE: CPT | Mod: WESLAB | Performed by: PODIATRIST

## 2024-01-12 PROCEDURE — 2500000004 HC RX 250 GENERAL PHARMACY W/ HCPCS (ALT 636 FOR OP/ED): Performed by: INTERNAL MEDICINE

## 2024-01-12 PROCEDURE — 1200000002 HC GENERAL ROOM WITH TELEMETRY DAILY

## 2024-01-12 PROCEDURE — 2500000002 HC RX 250 W HCPCS SELF ADMINISTERED DRUGS (ALT 637 FOR MEDICARE OP, ALT 636 FOR OP/ED): Performed by: NURSE PRACTITIONER

## 2024-01-12 PROCEDURE — A28820 PR AMPUTATION TOE,MT-P JT: Performed by: NURSE ANESTHETIST, CERTIFIED REGISTERED

## 2024-01-12 PROCEDURE — 3700000002 HC GENERAL ANESTHESIA TIME - EACH INCREMENTAL 1 MINUTE: Performed by: PODIATRIST

## 2024-01-12 PROCEDURE — 2500000001 HC RX 250 WO HCPCS SELF ADMINISTERED DRUGS (ALT 637 FOR MEDICARE OP): Performed by: STUDENT IN AN ORGANIZED HEALTH CARE EDUCATION/TRAINING PROGRAM

## 2024-01-12 PROCEDURE — 87015 SPECIMEN INFECT AGNT CONCNTJ: CPT | Mod: WESLAB | Performed by: PODIATRIST

## 2024-01-12 PROCEDURE — 2500000004 HC RX 250 GENERAL PHARMACY W/ HCPCS (ALT 636 FOR OP/ED): Performed by: ANESTHESIOLOGY

## 2024-01-12 PROCEDURE — 94760 N-INVAS EAR/PLS OXIMETRY 1: CPT

## 2024-01-12 PROCEDURE — 2500000004 HC RX 250 GENERAL PHARMACY W/ HCPCS (ALT 636 FOR OP/ED): Performed by: NURSE ANESTHETIST, CERTIFIED REGISTERED

## 2024-01-12 PROCEDURE — 3600000008 HC OR TIME - EACH INCREMENTAL 1 MINUTE - PROCEDURE LEVEL THREE: Performed by: PODIATRIST

## 2024-01-12 PROCEDURE — 2500000005 HC RX 250 GENERAL PHARMACY W/O HCPCS: Performed by: PODIATRIST

## 2024-01-12 PROCEDURE — 80069 RENAL FUNCTION PANEL: CPT | Performed by: INTERNAL MEDICINE

## 2024-01-12 PROCEDURE — 82947 ASSAY GLUCOSE BLOOD QUANT: CPT

## 2024-01-12 PROCEDURE — 7100000002 HC RECOVERY ROOM TIME - EACH INCREMENTAL 1 MINUTE: Performed by: PODIATRIST

## 2024-01-12 PROCEDURE — 88305 TISSUE EXAM BY PATHOLOGIST: CPT | Performed by: PATHOLOGY

## 2024-01-12 PROCEDURE — 3600000003 HC OR TIME - INITIAL BASE CHARGE - PROCEDURE LEVEL THREE: Performed by: PODIATRIST

## 2024-01-12 PROCEDURE — 36415 COLL VENOUS BLD VENIPUNCTURE: CPT | Performed by: INTERNAL MEDICINE

## 2024-01-12 PROCEDURE — 88305 TISSUE EXAM BY PATHOLOGIST: CPT | Mod: TC | Performed by: PODIATRIST

## 2024-01-12 PROCEDURE — 2500000005 HC RX 250 GENERAL PHARMACY W/O HCPCS

## 2024-01-12 PROCEDURE — 85027 COMPLETE CBC AUTOMATED: CPT | Performed by: INTERNAL MEDICINE

## 2024-01-12 PROCEDURE — 0Y6P0Z0 DETACHMENT AT RIGHT 1ST TOE, COMPLETE, OPEN APPROACH: ICD-10-PCS | Performed by: PODIATRIST

## 2024-01-12 PROCEDURE — 87070 CULTURE OTHR SPECIMN AEROBIC: CPT | Mod: WESLAB | Performed by: PODIATRIST

## 2024-01-12 PROCEDURE — 2500000004 HC RX 250 GENERAL PHARMACY W/ HCPCS (ALT 636 FOR OP/ED): Performed by: STUDENT IN AN ORGANIZED HEALTH CARE EDUCATION/TRAINING PROGRAM

## 2024-01-12 PROCEDURE — 2720000007 HC OR 272 NO HCPCS: Performed by: PODIATRIST

## 2024-01-12 PROCEDURE — A28820 PR AMPUTATION TOE,MT-P JT: Performed by: ANESTHESIOLOGY

## 2024-01-12 PROCEDURE — 3700000001 HC GENERAL ANESTHESIA TIME - INITIAL BASE CHARGE: Performed by: PODIATRIST

## 2024-01-12 RX ORDER — LIDOCAINE HYDROCHLORIDE 10 MG/ML
INJECTION INFILTRATION; PERINEURAL AS NEEDED
Status: DISCONTINUED | OUTPATIENT
Start: 2024-01-12 | End: 2024-01-12 | Stop reason: HOSPADM

## 2024-01-12 RX ORDER — FENTANYL CITRATE 50 UG/ML
50 INJECTION, SOLUTION INTRAMUSCULAR; INTRAVENOUS EVERY 5 MIN PRN
Status: DISCONTINUED | OUTPATIENT
Start: 2024-01-12 | End: 2024-01-12 | Stop reason: HOSPADM

## 2024-01-12 RX ORDER — INSULIN GLARGINE 100 [IU]/ML
30 INJECTION, SOLUTION SUBCUTANEOUS NIGHTLY
Status: DISCONTINUED | OUTPATIENT
Start: 2024-01-12 | End: 2024-01-14

## 2024-01-12 RX ORDER — LIDOCAINE HYDROCHLORIDE 10 MG/ML
0.1 INJECTION INFILTRATION; PERINEURAL ONCE
Status: DISCONTINUED | OUTPATIENT
Start: 2024-01-12 | End: 2024-01-12 | Stop reason: HOSPADM

## 2024-01-12 RX ORDER — FENTANYL CITRATE 50 UG/ML
INJECTION, SOLUTION INTRAMUSCULAR; INTRAVENOUS AS NEEDED
Status: DISCONTINUED | OUTPATIENT
Start: 2024-01-12 | End: 2024-01-12

## 2024-01-12 RX ORDER — OXYCODONE HYDROCHLORIDE 5 MG/1
5 TABLET ORAL EVERY 4 HOURS PRN
Status: DISCONTINUED | OUTPATIENT
Start: 2024-01-12 | End: 2024-01-12 | Stop reason: HOSPADM

## 2024-01-12 RX ORDER — SODIUM CHLORIDE, SODIUM LACTATE, POTASSIUM CHLORIDE, CALCIUM CHLORIDE 600; 310; 30; 20 MG/100ML; MG/100ML; MG/100ML; MG/100ML
100 INJECTION, SOLUTION INTRAVENOUS CONTINUOUS
Status: DISCONTINUED | OUTPATIENT
Start: 2024-01-12 | End: 2024-01-12 | Stop reason: HOSPADM

## 2024-01-12 RX ORDER — PROPOFOL 10 MG/ML
INJECTION, EMULSION INTRAVENOUS AS NEEDED
Status: DISCONTINUED | OUTPATIENT
Start: 2024-01-12 | End: 2024-01-12

## 2024-01-12 RX ORDER — MIDAZOLAM HYDROCHLORIDE 1 MG/ML
INJECTION, SOLUTION INTRAMUSCULAR; INTRAVENOUS AS NEEDED
Status: DISCONTINUED | OUTPATIENT
Start: 2024-01-12 | End: 2024-01-12

## 2024-01-12 RX ORDER — LABETALOL HYDROCHLORIDE 5 MG/ML
5 INJECTION, SOLUTION INTRAVENOUS ONCE AS NEEDED
Status: COMPLETED | OUTPATIENT
Start: 2024-01-12 | End: 2024-01-12

## 2024-01-12 RX ORDER — FENTANYL CITRATE 50 UG/ML
25 INJECTION, SOLUTION INTRAMUSCULAR; INTRAVENOUS EVERY 5 MIN PRN
Status: DISCONTINUED | OUTPATIENT
Start: 2024-01-12 | End: 2024-01-12 | Stop reason: HOSPADM

## 2024-01-12 RX ORDER — HEPARIN SODIUM 5000 [USP'U]/ML
5000 INJECTION, SOLUTION INTRAVENOUS; SUBCUTANEOUS EVERY 8 HOURS
Status: DISCONTINUED | OUTPATIENT
Start: 2024-01-12 | End: 2024-01-18 | Stop reason: HOSPADM

## 2024-01-12 RX ORDER — BUPIVACAINE HYDROCHLORIDE 5 MG/ML
INJECTION, SOLUTION PERINEURAL AS NEEDED
Status: DISCONTINUED | OUTPATIENT
Start: 2024-01-12 | End: 2024-01-12 | Stop reason: HOSPADM

## 2024-01-12 RX ORDER — ONDANSETRON HYDROCHLORIDE 2 MG/ML
4 INJECTION, SOLUTION INTRAVENOUS ONCE AS NEEDED
Status: DISCONTINUED | OUTPATIENT
Start: 2024-01-12 | End: 2024-01-12 | Stop reason: HOSPADM

## 2024-01-12 RX ORDER — SODIUM CHLORIDE, SODIUM LACTATE, POTASSIUM CHLORIDE, CALCIUM CHLORIDE 600; 310; 30; 20 MG/100ML; MG/100ML; MG/100ML; MG/100ML
INJECTION, SOLUTION INTRAVENOUS CONTINUOUS PRN
Status: DISCONTINUED | OUTPATIENT
Start: 2024-01-12 | End: 2024-01-12

## 2024-01-12 RX ADMIN — DICYCLOMINE HYDROCHLORIDE 10 MG: 10 CAPSULE ORAL at 16:51

## 2024-01-12 RX ADMIN — LINEZOLID 600 MG: 600 TABLET, FILM COATED ORAL at 21:38

## 2024-01-12 RX ADMIN — LIDOCAINE HYDROCHLORIDE 50 ML: 10 INJECTION, SOLUTION INFILTRATION; PERINEURAL at 13:16

## 2024-01-12 RX ADMIN — CEFEPIME 2 G: 2 INJECTION, POWDER, FOR SOLUTION INTRAVENOUS at 04:05

## 2024-01-12 RX ADMIN — MIDAZOLAM 2 MG: 1 INJECTION INTRAMUSCULAR; INTRAVENOUS at 12:23

## 2024-01-12 RX ADMIN — LINEZOLID 600 MG: 600 TABLET, FILM COATED ORAL at 07:19

## 2024-01-12 RX ADMIN — SODIUM CHLORIDE, POTASSIUM CHLORIDE, SODIUM LACTATE AND CALCIUM CHLORIDE: 600; 310; 30; 20 INJECTION, SOLUTION INTRAVENOUS at 13:13

## 2024-01-12 RX ADMIN — Medication 1 TABLET: at 07:19

## 2024-01-12 RX ADMIN — GABAPENTIN 300 MG: 300 CAPSULE ORAL at 21:38

## 2024-01-12 RX ADMIN — INSULIN GLARGINE 30 UNITS: 100 INJECTION, SOLUTION SUBCUTANEOUS at 21:40

## 2024-01-12 RX ADMIN — FENTANYL CITRATE 50 MCG: 50 INJECTION, SOLUTION INTRAMUSCULAR; INTRAVENOUS at 13:16

## 2024-01-12 RX ADMIN — MIDAZOLAM 2 MG: 1 INJECTION INTRAMUSCULAR; INTRAVENOUS at 13:13

## 2024-01-12 RX ADMIN — LABETALOL HYDROCHLORIDE 5 MG: 5 INJECTION INTRAVENOUS at 11:23

## 2024-01-12 RX ADMIN — SODIUM BICARBONATE 80 ML/HR: 84 INJECTION, SOLUTION INTRAVENOUS at 22:56

## 2024-01-12 RX ADMIN — FENTANYL CITRATE 50 MCG: 50 INJECTION, SOLUTION INTRAMUSCULAR; INTRAVENOUS at 13:13

## 2024-01-12 RX ADMIN — ATORVASTATIN CALCIUM 40 MG: 40 TABLET, FILM COATED ORAL at 21:38

## 2024-01-12 RX ADMIN — PROPOFOL 100 MG: 10 INJECTION, EMULSION INTRAVENOUS at 13:16

## 2024-01-12 RX ADMIN — GABAPENTIN 300 MG: 300 CAPSULE ORAL at 07:19

## 2024-01-12 RX ADMIN — DICYCLOMINE HYDROCHLORIDE 10 MG: 10 CAPSULE ORAL at 21:38

## 2024-01-12 RX ADMIN — Medication 400 MG: at 07:19

## 2024-01-12 RX ADMIN — INSULIN LISPRO 6 UNITS: 100 INJECTION, SOLUTION INTRAVENOUS; SUBCUTANEOUS at 16:50

## 2024-01-12 RX ADMIN — SENNOSIDES AND DOCUSATE SODIUM 2 TABLET: 8.6; 5 TABLET ORAL at 21:38

## 2024-01-12 SDOH — HEALTH STABILITY: MENTAL HEALTH: CURRENT SMOKER: 0

## 2024-01-12 ASSESSMENT — COGNITIVE AND FUNCTIONAL STATUS - GENERAL
WALKING IN HOSPITAL ROOM: A LITTLE
DAILY ACTIVITIY SCORE: 24
CLIMB 3 TO 5 STEPS WITH RAILING: A LOT
MOBILITY SCORE: 24
DAILY ACTIVITIY SCORE: 24
STANDING UP FROM CHAIR USING ARMS: A LITTLE
MOBILITY SCORE: 20
MOBILITY SCORE: 24

## 2024-01-12 ASSESSMENT — PAIN SCALES - GENERAL
PAINLEVEL_OUTOF10: 0 - NO PAIN
PAINLEVEL_OUTOF10: 5 - MODERATE PAIN
PAINLEVEL_OUTOF10: 0 - NO PAIN
PAINLEVEL_OUTOF10: 0 - NO PAIN

## 2024-01-12 ASSESSMENT — PAIN - FUNCTIONAL ASSESSMENT
PAIN_FUNCTIONAL_ASSESSMENT: 0-10

## 2024-01-12 ASSESSMENT — PAIN DESCRIPTION - DESCRIPTORS: DESCRIPTORS: DISCOMFORT

## 2024-01-12 NOTE — CARE PLAN
The patient's goals for the shift include Safety    The clinical goals for the shift include patient will have surgery today    Problem: Pain  Goal: My pain/discomfort is manageable  Outcome: Progressing     Problem: Safety  Goal: Patient will be injury free during hospitalization  Outcome: Progressing     Problem: Daily Care  Goal: Daily care needs are met  Outcome: Progressing     Problem: Psychosocial Needs  Goal: Demonstrates ability to cope with hospitalization/illness  Outcome: Progressing  Goal: Collaborate with me, my family, and caregiver to identify my specific goals  Outcome: Progressing

## 2024-01-12 NOTE — PROGRESS NOTES
Patient not medically clear. Patient having surgery on this day with podiatry. ID following. At this time the discharge plan is to return home, patient declining HHC. If patient will need IV antibiotics, this will need to be set up and then she will need HHC. Will follow.     **PATIENT WITH A SAFE DISCHARGE PLAN, IF DETERMINED THAT SHE NEEDS IV ANTIBIOTICS THEN THAT WILL NEED TO BE SET UP      Cesia Garcia RN

## 2024-01-12 NOTE — PROGRESS NOTES
Nuris Squires is a 58 y.o. female on day 1 of admission presenting with Right foot infection.      Subjective   Patient denies any symptoms other than occasional diarrhea however her creatinine continues to rise.  Unclear how much urine she is made as it is not all recorded.  She is going for amputation of her toe today.       Objective          Vitals 24HR  Heart Rate:  [71-86]   Temp:  [36 °C (96.8 °F)-36.9 °C (98.4 °F)]   Resp:  [17-20]   BP: (131-203)/(70-89)   SpO2:  [95 %-97 %]       Intake/Output last 3 Shifts:    Intake/Output Summary (Last 24 hours) at 1/12/2024 1115  Last data filed at 1/12/2024 0405  Gross per 24 hour   Intake 794.67 ml   Output --   Net 794.67 ml       Physical Exam  Constitutional:       General: She is awake. She is not in acute distress.     Appearance: She is not toxic-appearing.   Cardiovascular:      Rate and Rhythm: Regular rhythm.      Heart sounds:      No friction rub.   Pulmonary:      Effort: Pulmonary effort is normal.      Breath sounds: Normal breath sounds.   Abdominal:      General: Bowel sounds are normal.      Palpations: Abdomen is soft.      Tenderness: There is no guarding or rebound.   Musculoskeletal:      Comments: 1+ peripheral edema , no cyanosis    Relevant Results  Results for orders placed or performed during the hospital encounter of 01/09/24 (from the past 24 hour(s))   POCT GLUCOSE   Result Value Ref Range    POCT Glucose 192 (H) 74 - 99 mg/dL   POCT GLUCOSE   Result Value Ref Range    POCT Glucose 190 (H) 74 - 99 mg/dL   Urinalysis with Reflex Culture and Microscopic   Result Value Ref Range    Color, Urine Light-Yellow Light-Yellow, Yellow, Dark-Yellow    Appearance, Urine Turbid (N) Clear    Specific Gravity, Urine 1.017 1.005 - 1.035    pH, Urine 5.0 5.0, 5.5, 6.0, 6.5, 7.0, 7.5, 8.0    Protein, Urine 70 (1+) (A) NEGATIVE, 10 (TRACE), 20 (TRACE) mg/dL    Glucose, Urine 1000 (4+) (A) Normal mg/dL    Blood, Urine 0.03 (TRACE) (A) NEGATIVE    Ketones,  Urine TRACE (A) NEGATIVE mg/dL    Bilirubin, Urine NEGATIVE NEGATIVE    Urobilinogen, Urine Normal Normal mg/dL    Nitrite, Urine NEGATIVE NEGATIVE    Leukocyte Esterase, Urine 500 Rashi/µL (A) NEGATIVE   Extra Urine Gray Tube   Result Value Ref Range    Extra Tube Hold for add-ons.    Microscopic Only, Urine   Result Value Ref Range    WBC, Urine 1-5 1-5, NONE /HPF    RBC, Urine 11-20 (A) NONE, 1-2, 3-5 /HPF    Squamous Epithelial Cells, Urine 26-50 (1+) Reference range not established. /HPF    Bacteria, Urine 1+ (A) NONE SEEN /HPF    Budding Yeast, Urine PRESENT (A) NONE /HPF   POCT GLUCOSE   Result Value Ref Range    POCT Glucose 254 (H) 74 - 99 mg/dL   Renal Function Panel   Result Value Ref Range    Glucose 248 (H) 65 - 99 mg/dL    Sodium 133 133 - 145 mmol/L    Potassium 3.4 3.4 - 5.1 mmol/L    Chloride 99 97 - 107 mmol/L    Bicarbonate 18 (L) 24 - 31 mmol/L    Urea Nitrogen 25 8 - 25 mg/dL    Creatinine 4.20 (H) 0.40 - 1.60 mg/dL    eGFR 12 (L) >60 mL/min/1.73m*2    Calcium 8.2 (L) 8.5 - 10.4 mg/dL    Phosphorus 4.4 2.5 - 4.5 mg/dL    Albumin 2.4 (L) 3.5 - 5.0 g/dL    Anion Gap 16 <=19 mmol/L   CBC   Result Value Ref Range    WBC 8.7 4.4 - 11.3 x10*3/uL    nRBC 0.0 0.0 - 0.0 /100 WBCs    RBC 3.13 (L) 4.00 - 5.20 x10*6/uL    Hemoglobin 9.4 (L) 12.0 - 16.0 g/dL    Hematocrit 28.5 (L) 36.0 - 46.0 %    MCV 91 80 - 100 fL    MCH 30.0 26.0 - 34.0 pg    MCHC 33.0 32.0 - 36.0 g/dL    RDW 12.1 11.5 - 14.5 %    Platelets 385 150 - 450 x10*3/uL   POCT GLUCOSE   Result Value Ref Range    POCT Glucose 227 (H) 74 - 99 mg/dL   POCT GLUCOSE   Result Value Ref Range    POCT Glucose 229 (H) 74 - 99 mg/dL   POCT GLUCOSE   Result Value Ref Range    POCT Glucose 232 (H) 74 - 99 mg/dL            Assessment/Plan   1.  Acute kidney injury  - Likely acute tubular necrosis multifactorial in the setting of hypotension, infection, IV contrast, Vancomycin and Zosyn  2.  Chronic kidney disease stage III  - baseline Cr 1.2-1.5  3.   Cellulitis of the foot  4.  Diabetes mellitus     Plan: Continue IV fluids half-normal saline with 75 millequivalents of sodium bicarbonate, pending postvoid bladder scan.  Hold ACE inhibitor and metformin. Infectious disease on board.  Renally dose all medications and antibiotics. No indications for dialysis.  Defer whether the budding yeast in the urinalysis needs to be treated to infectious disease.  Need strict I/O's, recording of all urine output.  I did discuss with the patient the indications, risks and benefits of renal replacement therapy and she did give consent to proceed with dialysis if it is needed.  No indications for dialysis today however will monitor closely.    Eric Leon MD

## 2024-01-12 NOTE — PROGRESS NOTES
Physical Therapy                 Therapy Communication Note    Patient Name: Nuris Squires  MRN: [id number]  Today's Date: 1/12/2024     Discipline: Physical Therapy    Missed Visit Reason: Patient in a medical procedure    Missed Time: Attempt    Comment:

## 2024-01-12 NOTE — ANESTHESIA PROCEDURE NOTES
Airway  Date/Time: 1/12/2024 1:16 PM  Urgency: elective    Airway not difficult    Staffing  Performed: CRNA   Authorized by: Randall Quintanilla MD    Performed by: RUBA Zamora  Patient location during procedure: OR    Indications and Patient Condition  Indications for airway management: anesthesia  Spontaneous Ventilation: absent  Sedation level: deep  Preoxygenated: yes  Patient position: sniffing  Mask difficulty assessment: 1 - vent by mask    Final Airway Details  Final airway type: supraglottic airway      Successful airway: classic  Size 4     Number of attempts at approach: 1

## 2024-01-12 NOTE — CARE PLAN
The patient's goals for the shift include Safety    The clinical goals for the shift include Pain control, adequate sleep, NPO Midnight    Over the shift, the patient did not make progress toward the following goals. Barriers to progression include . Recommendations to address these barriers include .      Problem: Pain  Goal: My pain/discomfort is manageable  Outcome: Progressing     Problem: Safety  Goal: Patient will be injury free during hospitalization  Outcome: Progressing     Problem: Daily Care  Goal: Daily care needs are met  Outcome: Progressing     Problem: Psychosocial Needs  Goal: Demonstrates ability to cope with hospitalization/illness  Outcome: Progressing  Goal: Collaborate with me, my family, and caregiver to identify my specific goals  Outcome: Progressing     Problem: Discharge Barriers  Goal: My discharge needs are met  Outcome: Progressing     Problem: Skin  Goal: Prevent/manage excess moisture  Outcome: Progressing  Goal: Prevent/minimize sheer/friction injuries  Outcome: Progressing     Problem: Diabetes  Goal: Achieve decreasing blood glucose levels by end of shift  Outcome: Progressing  Goal: Increase stability of blood glucose readings by end of shift  Outcome: Progressing  Goal: Decrease in ketones present in urine by end of shift  1/12/2024 0609 by Brooks Thornton RN  Outcome: Progressing  1/11/2024 2243 by Brooks Thornton RN  Flowsheets (Taken 1/11/2024 2243)  Decrease in ketones present in urine by end of shift: Med administration/monitoring of effect  Goal: Maintain electrolyte levels within acceptable range throughout shift  1/12/2024 0609 by Brooks Thornton RN  Outcome: Progressing  1/11/2024 2243 by Brooks Thornton RN  Flowsheets (Taken 1/11/2024 2243)  Maintain electrolyte levels within acceptable range throughout shift: Med administration/monitoring of effect  Goal: Maintain glucose levels >70mg/dl to <250mg/dl throughout shift  1/12/2024 0609 by Brooks Thornton RN  Outcome: Progressing  1/11/2024  2243 by Brooks Thornton RN  Flowsheets (Taken 1/11/2024 2243)  Maintain glucose levels >70mg/dl to <250mg/dl throughout shift: Med administration/monitoring of effect  Goal: Learn about and adhere to nutrition recommendations by end of shift  1/12/2024 0609 by Brooks Thornton RN  Outcome: Progressing  1/11/2024 2243 by Brooks Thornton RN  Flowsheets (Taken 1/11/2024 2243)  Learn about and adhere to nutrition recommendations by end of shift: Ensure/encourage compliance with appropriate diet  Goal: Receive DSME education by end of shift  Outcome: Progressing     Order review at end of shift completed.  Patient asleep on bed with even and unlabored breathing.  Safety measures ensured and call light within  reach.  Plan of care on going.

## 2024-01-12 NOTE — NURSING NOTE
Patient being managed by Podiatry for Right Great toe wound. Order for Betadine Swab, Cover with: 4x4 Gauze Dressing, Kerlix, Tape with: Compression Wrap Lightly applied

## 2024-01-12 NOTE — PROGRESS NOTES
Occupational Therapy                 Therapy Communication Note    Patient Name: Nuris Squires  MRN: [id number]  Today's Date: 1/12/2024     Discipline: Occupational Therapy    Missed Visit Reason: Missed Visit Reason: Patient in a medical procedure    Missed Time: Attempt

## 2024-01-12 NOTE — NURSING NOTE
Assumed patient care. BSSR received from previous Nurse. Seen patient lying on bed awake, sitting on the edge of the bed with no distress or discomfort noted. Needs attended. Safety measures ensured and call light in reach.   Plan of care ongoing.

## 2024-01-12 NOTE — ANESTHESIA PREPROCEDURE EVALUATION
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, with long-term current use of insulin (CMS/HCC)      Hematology   (+) Sickle-cell disease without crisis (CMS/HCC)      Musculoskeletal   (+) Chronic left-sided low back pain with left-sided sciatica      Infectious Disease   (+) Dermatophytosis of nail   (+) Right foot infection       Clinical information reviewed:   Tobacco  Allergies  Meds  Problems  Med Hx  Surg Hx   Fam Hx  Soc   Hx        NPO Detail:  NPO/Void Status  Date of Last Liquid: 01/11/24  Time of Last Liquid: 2100  Date of Last Solid: 01/11/24  Time of Last Solid: 2100         Physical Exam    Airway  Mallampati: III  TM distance: >3 FB  Neck ROM: full     Cardiovascular    Dental    Pulmonary    Abdominal            Anesthesia Plan    History of general anesthesia?: yes  History of complications of general anesthesia?: no    ASA 3     general     The patient is not a current smoker.    intravenous induction   Postoperative administration of opioids is intended.  Trial extubation is planned.  Anesthetic plan and risks discussed with patient.  Use of blood products discussed with patient who.    Plan discussed with attending and CRNA.

## 2024-01-12 NOTE — OP NOTE
Amputation Digit Foot, Right (R) Operative Note     Date: 2024  OR Location: [facility]    Name: Nuris Squires, : 1965, Age: 58 y.o., MRN: [id number], Sex: female    Diagnosis  Pre-op Diagnosis     * Right foot infection [L08.9] Post-op Diagnosis     * Right foot infection [L08.9]   M86.171 Right foot osteomyelitis     Procedures  Amputation Digit Foot, Right  43825 - ND AMPUTATION TOE METATARSOPHALANGEAL JOINT   Bone Biopsy Right Foot Medial Cuneiform     Surgeons      * Tonya Pizano - Primary    Resident/Fellow/Other Assistant:  Surgeon(s) and Role:    Procedure Summary  Anesthesia: General  ASA: III  Anesthesia Staff: Anesthesiologist: Randall Quintanilla MD  CRNA: TAL Zamora-CRNA  Estimated Blood Loss: 10 mL  Intra-op Medications:   Medication Name Total Dose   insulin lispro (HumaLOG) injection 0-15 Units Cannot be calculated   labetaloL (Normodyne,Trandate) injection 5 mg 5 mg              Anesthesia Record               Intraprocedure I/O Totals          Intake    LR infusion 200.00 mL    Total Intake 200 mL          Specimen:   ID Type Source Tests Collected by Time   1 : right great toe (bone) proximal phalanx Tissue DIGIT FIRST, RIGHT FOOT SURGICAL PATHOLOGY EXAM, AFB CULTURE/SMEAR, FUNGAL CULTURE/SMEAR, TISSUE/WOUND CULTURE/SMEAR Tonya Pizano DPM 2024 1330   2 : right medial cuneiform (bone) Tissue DIGIT FIRST, RIGHT FOOT SURGICAL PATHOLOGY EXAM, AFB CULTURE/SMEAR, FUNGAL CULTURE/SMEAR, TISSUE/WOUND CULTURE/SMEAR Tonya Pizano DPM 2024 1338        Staff:   Circulator: Suzanne Anaya RN  Scrub Person: Nataly Huang; Guilherme Johnson         Drains and/or Catheters: * None in log *    Tourniquet Times:     Total Tourniquet Time Documented:  Calf (Right) - 41 minutes  Total: Calf (Right) - 41 minutes      Implants:     Indications: Nuris Squires is an 58 y.o. female who is having surgery for Right foot infection [L08.9] .     Patient was consulted at  length regarding the goals, risks, and benefits of surgical intervention and the most common complications including delayed healing, mal position, infection, numbness, swelling, DVT, and residual pain and possible need for revisional surgery. Also discussed were idiosyncratic risks such as loss of limb and/or death associated with adverse reactions to medications, anesthesia, VTE, or infection were discussed at length with the patient.  Expectations and goals of return to weightbearing/work/school time were discussed as tentative and somewhat variable upon patient's symptomatology postoperatively. Patient was encouraged to call or present to the office or my personal cell phone number if there are any unanswered questions.  The patient understands that one prescription for pain medication will be dispensed at the time of surgery only if needed. If they need a second prescription, they will receive a pain management referral at that time, along with the refill, and there will be no further narcotic pain medication dispensed after the one refill.  Patient does wish to proceed with planned surgical intervention at the conclusion of this conversation. Significant time was spent filling out, and orally reiterating all printed preoperative paperwork and fully explaining intended procedure in layman's terms as well as confirming laterality. Patient denied having further questions at this time regarding the intended surgical procedure(s), will plan to remove the infected soft tissue which was so severe which determined the level of amputation.      Patient was transferred from the pre operative holding area to the OR and placed on the OR table in a secure supine position.  Anesthesia was administered per the anesthesiologist.  The surgical extremity was prepped and draped in sterile aseptic technique.  An appropriate time out was performed and all in the room were in agreement.       DESCRIPTION OF PROCEDURE:   Procedure  #1 Right Great Toe Amputation  Attention was then directed to the right lower extremity.  There was an ulceration noted which measured the entirety of the right hallux to the level of the MPJ right foot and was down to the level of subcutaneous tissue, tendon and bone.  An incision was made through the ulceration and the ulceration was completely excised in a racquet type incision to the level of bone to excise the necrotic and fibrotic tissue.  There was a significant amount of necrotic and fibrotic tissue which was excised and passed to the back table.  The bone was debrided and excised and passed to the back table and sent to pathology and microbiology at the level of the 1st MTPJ.      The incision site was then irrigated with copious amounts of sterile saline via pulse lavage.  Electrocautery was utilized to obtain hemostasis.  There was noted to be adequate blood flow to the surgical extremity upon dissection.      The incision site was closed in anatomical layers utilizing 3.0 vicryl and 3.0 nylon.      Procedure #2 Bone Biopsy  Attention was then directed to the medial cuneiform which a jam shidi was utilized to obtain a bone biopsy and sent to pathology and microbiology.      A dry, sterile dressing was applied consisting of adaptac, 4x4 gauze, kerlex and an ace bandage.  A well padded posterior splint was applied to the surgical extremity.       The patient tolerated the procedure and anesthesia well and without complication.  The patient was transferred from the OR to the PACU with all vital signs stable and vascular status unchanged to the surgical extremity.  Digits 1,2,3,4 and 5 were well perfused at the end of the procedure.         Complications:  None; patient tolerated the procedure well.    Disposition: PACU - hemodynamically stable.  Condition: stable       Tonya Pizano  Phone Number: [phone]

## 2024-01-12 NOTE — ANESTHESIA POSTPROCEDURE EVALUATION
Patient: Nuris Squires    Procedure Summary       Date: 01/12/24 Room / Location: [facility] OR 11 / [facility]    Anesthesia Start: 1223 Anesthesia Stop: 1413    Procedure: Amputation Digit Foot, Right (Right) Diagnosis:       Right foot infection      (Right foot infection [L08.9])    Surgeons: Tonya Pizano DPM Responsible Provider: Randall Quintanilla MD    Anesthesia Type: general ASA Status: 3            Anesthesia Type: general    Vitals Value Taken Time   /72 01/12/24 1441   Temp 36.3 °C (97.3 °F) 01/12/24 1412   Pulse 70 01/12/24 1446   Resp 13 01/12/24 1446   SpO2 97 % 01/12/24 1446   Vitals shown include unvalidated device data.    Anesthesia Post Evaluation    Patient location during evaluation: PACU  Patient participation: complete - patient participated  Level of consciousness: awake  Pain management: adequate  Airway patency: patent  Cardiovascular status: acceptable  Respiratory status: acceptable  Hydration status: acceptable  Postoperative Nausea and Vomiting: none        There were no known notable events for this encounter.

## 2024-01-12 NOTE — PROGRESS NOTES
Nuris Squires is a 58 y.o. female on day 1 of admission presenting with Right foot infection.    Subjective   Patient returned to the floor from OR    Patient seen and examined.  Resting in bed in no acute distress.  Visitor at bedside.  Awake alert and oriented x 3.  No complaints.  No pain.  No shortness of breath or chest pain.  Diarrhea x 3 over past 24 hours    Objective     Physical Exam  Vitals reviewed.   Constitutional:       General: She is not in acute distress.     Appearance: She is obese. She is not ill-appearing or toxic-appearing.   HENT:      Head: Normocephalic and atraumatic.      Right Ear: Tympanic membrane normal.      Left Ear: Tympanic membrane normal.      Nose: Nose normal.      Mouth/Throat:      Mouth: Mucous membranes are moist.      Pharynx: Oropharynx is clear.   Eyes:      Extraocular Movements: Extraocular movements intact.      Conjunctiva/sclera: Conjunctivae normal.      Pupils: Pupils are equal, round, and reactive to light.   Cardiovascular:      Rate and Rhythm: Normal rate and regular rhythm.      Pulses: Normal pulses.      Heart sounds: Normal heart sounds.   Pulmonary:      Effort: Pulmonary effort is normal. No respiratory distress.      Breath sounds: Normal breath sounds. No wheezing, rhonchi or rales.      Comments: 2 liters nasal cannula, no home oxygen  Abdominal:      General: Bowel sounds are normal. There is no distension.      Palpations: Abdomen is soft.      Tenderness: There is no abdominal tenderness.   Genitourinary:     Comments: Deferred  Musculoskeletal:         General: Swelling present. No tenderness. Normal range of motion.      Cervical back: Normal range of motion and neck supple.   Skin:     General: Skin is warm and dry.      Capillary Refill: Capillary refill takes less than 2 seconds.      Comments: Right foot post-operative dressing clean dry intact   Neurological:      General: No focal deficit present.      Mental Status: She is alert and  "oriented to person, place, and time.   Psychiatric:         Mood and Affect: Mood normal.         Behavior: Behavior normal.       Last Recorded Vitals  Blood pressure 157/66, pulse 71, temperature 36.3 °C (97.3 °F), temperature source Temporal, resp. rate 13, height 1.575 m (5' 2\"), weight 102 kg (225 lb), SpO2 95 %.    Intake/Output last 3 Shifts:  I/O last 3 completed shifts:  In: 1194.7 (11.7 mL/kg) [P.O.:300; I.V.:694.7 (6.8 mL/kg); IV Piggyback:200]  Out: 50 (0.5 mL/kg) [Urine:50 (0 mL/kg/hr)]  Weight: 102.1 kg     Relevant Results  Results for orders placed or performed during the hospital encounter of 01/09/24 (from the past 24 hour(s))   POCT GLUCOSE   Result Value Ref Range    POCT Glucose 190 (H) 74 - 99 mg/dL   Urinalysis with Reflex Culture and Microscopic   Result Value Ref Range    Color, Urine Light-Yellow Light-Yellow, Yellow, Dark-Yellow    Appearance, Urine Turbid (N) Clear    Specific Gravity, Urine 1.017 1.005 - 1.035    pH, Urine 5.0 5.0, 5.5, 6.0, 6.5, 7.0, 7.5, 8.0    Protein, Urine 70 (1+) (A) NEGATIVE, 10 (TRACE), 20 (TRACE) mg/dL    Glucose, Urine 1000 (4+) (A) Normal mg/dL    Blood, Urine 0.03 (TRACE) (A) NEGATIVE    Ketones, Urine TRACE (A) NEGATIVE mg/dL    Bilirubin, Urine NEGATIVE NEGATIVE    Urobilinogen, Urine Normal Normal mg/dL    Nitrite, Urine NEGATIVE NEGATIVE    Leukocyte Esterase, Urine 500 Rashi/µL (A) NEGATIVE   Extra Urine Gray Tube   Result Value Ref Range    Extra Tube Hold for add-ons.    Microscopic Only, Urine   Result Value Ref Range    WBC, Urine 1-5 1-5, NONE /HPF    RBC, Urine 11-20 (A) NONE, 1-2, 3-5 /HPF    Squamous Epithelial Cells, Urine 26-50 (1+) Reference range not established. /HPF    Bacteria, Urine 1+ (A) NONE SEEN /HPF    Budding Yeast, Urine PRESENT (A) NONE /HPF   POCT GLUCOSE   Result Value Ref Range    POCT Glucose 254 (H) 74 - 99 mg/dL   Renal Function Panel   Result Value Ref Range    Glucose 248 (H) 65 - 99 mg/dL    Sodium 133 133 - 145 mmol/L    " Potassium 3.4 3.4 - 5.1 mmol/L    Chloride 99 97 - 107 mmol/L    Bicarbonate 18 (L) 24 - 31 mmol/L    Urea Nitrogen 25 8 - 25 mg/dL    Creatinine 4.20 (H) 0.40 - 1.60 mg/dL    eGFR 12 (L) >60 mL/min/1.73m*2    Calcium 8.2 (L) 8.5 - 10.4 mg/dL    Phosphorus 4.4 2.5 - 4.5 mg/dL    Albumin 2.4 (L) 3.5 - 5.0 g/dL    Anion Gap 16 <=19 mmol/L   CBC   Result Value Ref Range    WBC 8.7 4.4 - 11.3 x10*3/uL    nRBC 0.0 0.0 - 0.0 /100 WBCs    RBC 3.13 (L) 4.00 - 5.20 x10*6/uL    Hemoglobin 9.4 (L) 12.0 - 16.0 g/dL    Hematocrit 28.5 (L) 36.0 - 46.0 %    MCV 91 80 - 100 fL    MCH 30.0 26.0 - 34.0 pg    MCHC 33.0 32.0 - 36.0 g/dL    RDW 12.1 11.5 - 14.5 %    Platelets 385 150 - 450 x10*3/uL   POCT GLUCOSE   Result Value Ref Range    POCT Glucose 227 (H) 74 - 99 mg/dL   POCT GLUCOSE   Result Value Ref Range    POCT Glucose 229 (H) 74 - 99 mg/dL   POCT GLUCOSE   Result Value Ref Range    POCT Glucose 232 (H) 74 - 99 mg/dL   POCT GLUCOSE   Result Value Ref Range    POCT Glucose 233 (H) 74 - 99 mg/dL     Susceptibility data from last 90 days.  Collected Specimen Info Organism   01/10/24 Tissue/Biopsy from Diabetic Foot Ulcer Staphylococcus aureus     Candida glabrata     Mixed Gram-Positive Bacteria    01/09/24 Urine from Clean Catch/Voided Streptococcus agalactiae (Group B Streptococcus)         US renal complete    Result Date: 1/11/2024  Interpreted By:  James Aaron, STUDY: US RENAL COMPLETE   INDICATION: Signs/Symptoms:VENKAT   COMPARISON: CT scan from August 2023   ACCESSION NUMBER(S): [id number]   ORDERING CLINICIAN: CARYN HERRERA   TECHNIQUE: Real-time renal ultrasound was performed.   FINDINGS: There is some limitation, especially for the left kidney, due to patient's body habitus and overlying bowel gas obscuring some detail.   The right kidney measures 11.2 cm and the left kidney 10.5 cm in maximal length. There is normal thickness and echogenicity of the renal cortex bilaterally. No renal calculi are identified. There is  no hydronephrosis. No solid renal masses are seen. A minute, less than 5 mm cyst centrally in the left kidney that was reported on the previous CT scan can not be appreciated on today's ultrasound due to aforementioned limitations.   Images of the bladder were also obtained. The bladder is suboptimally evaluated due to incomplete distention. No bladder mass, stone or debris is identified. Bilateral ureteral jets were demonstrated.       Limited but grossly unremarkable renal ultrasound.   Signed by: James Aaron 1/11/2024 2:45 PM Dictation workstation:   [id number]    Vascular US PVR Without Exercise    Result Date: 1/11/2024           [place] [address]            Phone [phone]  Vascular Lab Report  VASC US PVR WITHOUT EXERCISE Patient Name:      ALBERT Mullins Physician:  [id number] Virgen Ernandez MD, RPVI Study Date:        1/11/2024            Ordering Provider:  [id number] JENNIFER CARTAGENA MRN/PID:           [id number]             Fellow: Accession#:        [id number]         Technologist:       Judie Robles RVT Date of Birth/Age: 1965 / 58 years Technologist 2: Gender:            F                    Encounter#:         [id number] Admission Status:  Inpatient            Location Performed: [facility]  Diagnosis/ICD: Peripheral vascular disease, unspecified-I73.9 CPT Codes:     44315 Peripheral artery PVR (multi segmental pressure  CONCLUSIONS:  Right Lower PVR: No evidence of arterial occlusive disease in the right lower extremity at rest. Normal digital perfusion noted. Triphasic flow is noted in the right common femoral artery, right popliteal artery, right posterior tibial artery and right dorsalis pedis artery. Left Lower PVR: No evidence of arterial occlusive disease in the left lower extremity at rest.  Triphasic flow is noted in the left common femoral artery, left popliteal artery, left posterior tibial artery and left dorsalis pedis artery. Unable to obtain TBI due to half amputation of the great toe and second digit amputation. PPG tracing appears normal.  Comparison: Compared with study from 9/17/2019, no significant change.  Imaging & Doppler Findings:  RIGHT Lower PVR                Pressures Ratios Right High Thigh               210 mmHg  1.76 Right Low Thigh                159 mmHg  1.34 Right Calf                     127 mmHg  1.07 Right Posterior Tibial (Ankle) 141 mmHg  1.18 Right Dorsalis Pedis (Ankle)   129 mmHg  1.08 Right Digit (Great Toe)        141 mmHg  1.18   LEFT Lower PVR                Pressures Ratios Left High Thigh               209 mmHg  1.76 Left Low Thigh                144 mmHg  1.21 Left Calf                     134 mmHg  1.13 Left Posterior Tibial (Ankle) 124 mmHg  1.04 Left Dorsalis Pedis (Ankle)   137 mmHg  1.15                      Right     Left Brachial Pressure 114 mmHg 119 mmHg   [id number] Virgen Ernandez MD, RPVI Electronically signed by [id number] Virgen Ernandez MD, RPMITCHELL on 1/11/2024 at 2:44:43 PM  ** Final **      Scheduled medications  aspirin, 81 mg, oral, Daily  atorvastatin, 40 mg, oral, Nightly  [START ON 1/13/2024] cefepime, 1 g, intravenous, q24h  cholecalciferol, 2,000 Units, oral, Daily  dicyclomine, 10 mg, oral, 4x daily  estradiol, 2 g, vaginal, Nightly  gabapentin, 300 mg, oral, BID  insulin glargine, 30 Units, subcutaneous, Nightly  insulin lispro, 0-15 Units, subcutaneous, TID with meals  linezolid, 600 mg, oral, q12h  [Held by provider] magnesium oxide, 400 mg, oral, Daily  multivitamin with minerals, 1 tablet, oral, Daily  pantoprazole, 40 mg, oral, Daily before breakfast  sennosides-docusate sodium, 2 tablet, oral, Nightly      Continuous medications  sodium bicarbonate 75 mEq, 80 mL/hr      PRN medications  PRN medications: acetaminophen **OR** acetaminophen **OR**  acetaminophen, benzocaine-menthol, dextromethorphan-guaifenesin, dextrose 10 % in water (D10W), dextrose, glucagon, guaiFENesin, meclizine, ondansetron ODT, polyethylene glycol      ASSESSMENT:  Status post right hallux amputation, bone biopsy  Right foot ulceration infection Staph A  Hypertension  Hyperlipidemia  Obesity  Type 2 diabetes mellitus hyperglycemia  Dizziness resolved  Hypokalemia  Hypoalbuminemia  Normocytic anemia  Pyuria UTI Group B Strep  Acute kidney injury worsening  Diarrhea    PLAN:  Status post right hallux amputation, bone biopsy.  Post-operative vital signs stable.  On 2 liters nasal cannula.  Titrate to room air as saturations allow.  Right foot post-operative dressing intact.  Post-operative care per Podiatry.  Bearing status per podiatry.  As needed analgesics..  Follow-up intraoperative cultures, bone biopsy.  Wound culture Staph aureus.  Urine for group B strep.  Blood cultures pending no growth.  IV antibiotics per Infectious disease Cefepime, Linezolid.  Renal dosing.  Follow up wound, blood cultures.  Reviewed.  Renal function worsening.  Nephrology following, input appreciated.  Renal ultrasound reviewed.  Limited, grossly unremarkable.  Patient now reports diarrhea over the past 24 hours.  I's and O's.  Monitor fluid volume status.  Hold nephrotoxic medications.  IV fluids per Nephrology.  Monitor renal function closely.  Point-of-care glucose reviewed.  Add Lantus 30 units at HS + sliding scale insulin.  Adjust insulin as needed for glycemic control.  Glycemic control pertinent for wound healing.  Consult diabetes education inpatient.  PT/OT.  Activity per Podiatry.  Fall precautions.  Up with assistance only.  Supportive care.  Patient reassured.  DVT prophylaxis.  Heparin subcutaneous, when okay with Podiatry.  Case management following for discharge planning.  Discussed with patient and Dr. Travis.    Celeste Hill, APRN-CNP

## 2024-01-12 NOTE — PROGRESS NOTES
BRIEF  ID  CHART  REVIEW  NOTE      Chart reviewed  Vital signs stable  Patient is in OR for debridement and exploration of the right foot  Preliminary wound cultures growing Staph aureus, STEVEN pending  Remains on cefepime and linezolid  Will reassess 1/13, please call sooner prn    Lucho Hickey MD  [facility]  Office:  [phone]

## 2024-01-12 NOTE — BRIEF OP NOTE
Date: 2024 - 2024  OR Location: [facility]    Name: Nuris Squires, : 1965, Age: 58 y.o., MRN: [id number], Sex: female    Diagnosis  Pre-op Diagnosis     * Right foot infection [L08.9] Post-op Diagnosis     * Right foot infection [L08.9]     Procedures  Amputation Digit Foot, Right  19481 - CT AMPUTATION TOE METATARSOPHALANGEAL JOINT      Surgeons      * Tonya Pizano - Primary    Resident/Fellow/Other Assistant:  Surgeon(s) and Role:  Barrington Llanos DPM PGY2  Bk BURRM PGY3    Procedure Summary  Anesthesia: General  ASA: III  Anesthesia Staff: Anesthesiologist: Randall Quintanilla MD  CRNA: TAL Zamora-CRNA  Estimated Blood Loss: 10mL  Intra-op Medications:   Medication Name Total Dose   insulin lispro (HumaLOG) injection 0-15 Units Cannot be calculated   labetaloL (Normodyne,Trandate) injection 5 mg 5 mg              Anesthesia Record               Intraprocedure I/O Totals          Intake    LR infusion 200.00 mL    Total Intake 200 mL          Specimen:   ID Type Source Tests Collected by Time   1 : right great toe (bone) proximal phalanx Tissue DIGIT FIRST, RIGHT FOOT SURGICAL PATHOLOGY EXAM, AFB CULTURE/SMEAR, FUNGAL CULTURE/SMEAR, TISSUE/WOUND CULTURE/SMEAR Tonya Pizano DPM 2024 1330   2 : right medial cuneiform (bone) Tissue DIGIT FIRST, RIGHT FOOT SURGICAL PATHOLOGY EXAM, AFB CULTURE/SMEAR, FUNGAL CULTURE/SMEAR, TISSUE/WOUND CULTURE/SMEAR Tonya Pizano DPM 2024 1338        Staff:   Circulator: Suzanne Anaya RN  Scrub Person: Nataly Huang; Guilherme Johnson          Findings: Consistent with clinical findings    Complications:  None; patient tolerated the procedure well.     Disposition: PACU - hemodynamically stable.  Condition: stable  Specimens Collected:   ID Type Source Tests Collected by Time   1 : right great toe (bone) proximal phalanx Tissue DIGIT FIRST, RIGHT FOOT SURGICAL PATHOLOGY EXAM, AFB CULTURE/SMEAR, FUNGAL CULTURE/SMEAR,  TISSUE/WOUND CULTURE/SMEAR Tonya Pizano DPM 1/12/2024 1335   2 : right medial cuneiform (bone) Tissue DIGIT FIRST, RIGHT FOOT SURGICAL PATHOLOGY EXAM, AFB CULTURE/SMEAR, FUNGAL CULTURE/SMEAR, TISSUE/WOUND CULTURE/SMEAR oTnya Pizano DPM 1/12/2024 8911     Attending Attestation:     Tonya Pizano  Phone Number: [phone]

## 2024-01-13 LAB
ALBUMIN SERPL-MCNC: 2.4 G/DL (ref 3.5–5)
ANION GAP SERPL CALC-SCNC: 17 MMOL/L
BACTERIA BLD CULT: NORMAL
BACTERIA BLD CULT: NORMAL
BACTERIA SPEC CULT: ABNORMAL
BACTERIA UR CULT: NORMAL
BUN SERPL-MCNC: 30 MG/DL (ref 8–25)
CALCIUM SERPL-MCNC: 8 MG/DL (ref 8.5–10.4)
CHLORIDE SERPL-SCNC: 98 MMOL/L (ref 97–107)
CO2 SERPL-SCNC: 17 MMOL/L (ref 24–31)
CREAT SERPL-MCNC: 5.3 MG/DL (ref 0.4–1.6)
EGFRCR SERPLBLD CKD-EPI 2021: 9 ML/MIN/1.73M*2
ERYTHROCYTE [DISTWIDTH] IN BLOOD BY AUTOMATED COUNT: 12.3 % (ref 11.5–14.5)
GLUCOSE BLD MANUAL STRIP-MCNC: 124 MG/DL (ref 74–99)
GLUCOSE BLD MANUAL STRIP-MCNC: 159 MG/DL (ref 74–99)
GLUCOSE BLD MANUAL STRIP-MCNC: 210 MG/DL (ref 74–99)
GLUCOSE SERPL-MCNC: 277 MG/DL (ref 65–99)
GRAM STN SPEC: ABNORMAL
HCT VFR BLD AUTO: 29.4 % (ref 36–46)
HGB BLD-MCNC: 9.5 G/DL (ref 12–16)
MCH RBC QN AUTO: 29.6 PG (ref 26–34)
MCHC RBC AUTO-ENTMCNC: 32.3 G/DL (ref 32–36)
MCV RBC AUTO: 92 FL (ref 80–100)
NRBC BLD-RTO: 0 /100 WBCS (ref 0–0)
PHOSPHATE SERPL-MCNC: 5.2 MG/DL (ref 2.5–4.5)
PLATELET # BLD AUTO: 424 X10*3/UL (ref 150–450)
POTASSIUM SERPL-SCNC: 3.7 MMOL/L (ref 3.4–5.1)
RBC # BLD AUTO: 3.21 X10*6/UL (ref 4–5.2)
SODIUM SERPL-SCNC: 132 MMOL/L (ref 133–145)
WBC # BLD AUTO: 9.7 X10*3/UL (ref 4.4–11.3)

## 2024-01-13 PROCEDURE — 82947 ASSAY GLUCOSE BLOOD QUANT: CPT

## 2024-01-13 PROCEDURE — 1200000002 HC GENERAL ROOM WITH TELEMETRY DAILY

## 2024-01-13 PROCEDURE — 2500000004 HC RX 250 GENERAL PHARMACY W/ HCPCS (ALT 636 FOR OP/ED): Performed by: STUDENT IN AN ORGANIZED HEALTH CARE EDUCATION/TRAINING PROGRAM

## 2024-01-13 PROCEDURE — 2500000002 HC RX 250 W HCPCS SELF ADMINISTERED DRUGS (ALT 637 FOR MEDICARE OP, ALT 636 FOR OP/ED): Performed by: NURSE PRACTITIONER

## 2024-01-13 PROCEDURE — 2500000001 HC RX 250 WO HCPCS SELF ADMINISTERED DRUGS (ALT 637 FOR MEDICARE OP): Performed by: STUDENT IN AN ORGANIZED HEALTH CARE EDUCATION/TRAINING PROGRAM

## 2024-01-13 PROCEDURE — 85027 COMPLETE CBC AUTOMATED: CPT | Performed by: STUDENT IN AN ORGANIZED HEALTH CARE EDUCATION/TRAINING PROGRAM

## 2024-01-13 PROCEDURE — 94760 N-INVAS EAR/PLS OXIMETRY 1: CPT | Mod: MUE

## 2024-01-13 PROCEDURE — 36415 COLL VENOUS BLD VENIPUNCTURE: CPT | Performed by: STUDENT IN AN ORGANIZED HEALTH CARE EDUCATION/TRAINING PROGRAM

## 2024-01-13 PROCEDURE — 80069 RENAL FUNCTION PANEL: CPT | Performed by: STUDENT IN AN ORGANIZED HEALTH CARE EDUCATION/TRAINING PROGRAM

## 2024-01-13 PROCEDURE — 2500000004 HC RX 250 GENERAL PHARMACY W/ HCPCS (ALT 636 FOR OP/ED): Performed by: NURSE PRACTITIONER

## 2024-01-13 RX ADMIN — Medication 1 TABLET: at 09:42

## 2024-01-13 RX ADMIN — Medication 2000 UNITS: at 06:03

## 2024-01-13 RX ADMIN — DICYCLOMINE HYDROCHLORIDE 10 MG: 10 CAPSULE ORAL at 17:47

## 2024-01-13 RX ADMIN — LINEZOLID 600 MG: 600 TABLET, FILM COATED ORAL at 09:42

## 2024-01-13 RX ADMIN — CEFEPIME 1 G: 1 INJECTION, POWDER, FOR SOLUTION INTRAMUSCULAR; INTRAVENOUS at 04:15

## 2024-01-13 RX ADMIN — INSULIN GLARGINE 30 UNITS: 100 INJECTION, SOLUTION SUBCUTANEOUS at 20:24

## 2024-01-13 RX ADMIN — ATORVASTATIN CALCIUM 40 MG: 40 TABLET, FILM COATED ORAL at 20:23

## 2024-01-13 RX ADMIN — SODIUM BICARBONATE 80 ML/HR: 84 INJECTION, SOLUTION INTRAVENOUS at 09:40

## 2024-01-13 RX ADMIN — DICYCLOMINE HYDROCHLORIDE 10 MG: 10 CAPSULE ORAL at 06:03

## 2024-01-13 RX ADMIN — INSULIN LISPRO 6 UNITS: 100 INJECTION, SOLUTION INTRAVENOUS; SUBCUTANEOUS at 09:42

## 2024-01-13 RX ADMIN — GABAPENTIN 300 MG: 300 CAPSULE ORAL at 09:42

## 2024-01-13 RX ADMIN — DICYCLOMINE HYDROCHLORIDE 10 MG: 10 CAPSULE ORAL at 13:20

## 2024-01-13 RX ADMIN — HEPARIN SODIUM 5000 UNITS: 5000 INJECTION, SOLUTION INTRAVENOUS; SUBCUTANEOUS at 06:03

## 2024-01-13 RX ADMIN — LINEZOLID 600 MG: 600 TABLET, FILM COATED ORAL at 20:23

## 2024-01-13 RX ADMIN — HEPARIN SODIUM 5000 UNITS: 5000 INJECTION, SOLUTION INTRAVENOUS; SUBCUTANEOUS at 22:56

## 2024-01-13 RX ADMIN — HEPARIN SODIUM 5000 UNITS: 5000 INJECTION, SOLUTION INTRAVENOUS; SUBCUTANEOUS at 13:20

## 2024-01-13 RX ADMIN — PANTOPRAZOLE SODIUM 40 MG: 40 TABLET, DELAYED RELEASE ORAL at 06:03

## 2024-01-13 RX ADMIN — DICYCLOMINE HYDROCHLORIDE 10 MG: 10 CAPSULE ORAL at 20:23

## 2024-01-13 RX ADMIN — GABAPENTIN 300 MG: 300 CAPSULE ORAL at 20:23

## 2024-01-13 RX ADMIN — ASPIRIN 81 MG: 81 TABLET, CHEWABLE ORAL at 09:42

## 2024-01-13 ASSESSMENT — COGNITIVE AND FUNCTIONAL STATUS - GENERAL
MOBILITY SCORE: 22
WALKING IN HOSPITAL ROOM: A LITTLE
CLIMB 3 TO 5 STEPS WITH RAILING: A LITTLE
DAILY ACTIVITIY SCORE: 24

## 2024-01-13 ASSESSMENT — PAIN - FUNCTIONAL ASSESSMENT: PAIN_FUNCTIONAL_ASSESSMENT: 0-10

## 2024-01-13 ASSESSMENT — PAIN SCALES - GENERAL
PAINLEVEL_OUTOF10: 0 - NO PAIN
PAINLEVEL_OUTOF10: 0 - NO PAIN

## 2024-01-13 NOTE — PROGRESS NOTES
Physical Therapy    Physical Therapy Treatment    Patient Name: Nuris Squires  MRN: [id number]  Today's Date: 1/13/2024    General Visit Information:      General  Missed Visit: Yes  Missed Visit Reason: Other (Comment) (Pt underwent R toe amputation yesterday 1/12/24, will require new PT orders)

## 2024-01-13 NOTE — PROGRESS NOTES
"Nuris Squires is a 58 y.o. female on day 2 of admission presenting with Right foot infection.    Subjective   Patient seen for acute kidney injury admitted with infected red right foot status post surgery today patient feels fine without any complaints at all       Objective     Physical Exam  Neck:      Vascular: No carotid bruit.   Cardiovascular:      Rate and Rhythm: Normal rate and regular rhythm.      Heart sounds: No murmur heard.     No friction rub. No gallop.   Pulmonary:      Breath sounds: No wheezing, rhonchi or rales.   Chest:      Chest wall: No tenderness.   Abdominal:      General: There is no distension.      Tenderness: There is no abdominal tenderness. There is no guarding or rebound.   Musculoskeletal:         General: No swelling or tenderness.      Cervical back: Neck supple.      Right lower leg: No edema.      Left lower leg: No edema.      Comments: Right foot surgery and dressing   Lymphadenopathy:      Cervical: No cervical adenopathy.         Last Recorded Vitals  Blood pressure 152/71, pulse 76, temperature 36.8 °C (98.2 °F), temperature source Oral, resp. rate 16, height 1.575 m (5' 2\"), weight 102 kg (225 lb), SpO2 100 %.    Intake/Output last 3 Shifts:  I/O last 3 completed shifts:  In: 1044.7 (10.2 mL/kg) [I.V.:894.7 (8.8 mL/kg); IV Piggyback:150]  Out: - (0 mL/kg)   Weight: 102.1 kg     Current Facility-Administered Medications:     acetaminophen (Tylenol) tablet 650 mg, 650 mg, oral, q4h PRN **OR** acetaminophen (Tylenol) oral liquid 650 mg, 650 mg, oral, q4h PRN **OR** acetaminophen (Tylenol) suppository 650 mg, 650 mg, rectal, q4h PRN, Bk Whittington, DPARIEL    aspirin chewable tablet 81 mg, 81 mg, oral, Daily, Bk Whittington DPM, 81 mg at 01/13/24 0942    atorvastatin (Lipitor) tablet 40 mg, 40 mg, oral, Nightly, Bk Whittington DPARIEL, 40 mg at 01/12/24 2138    benzocaine-menthol (Cepastat Sore Throat) 15-3.6 mg lozenge 1 lozenge, 1 lozenge, Mouth/Throat, q2h PRN, Bk GEORGE " JUANJOSE Whittington    cefepime (Maxipime) 1 g in dextrose 5 % 50 mL IV, 1 g, intravenous, q24h, Bk Whittington DPM, Stopped at 01/13/24 0445    cholecalciferol (Vitamin D-3) tablet 2,000 Units, 2,000 Units, oral, Daily, Bk Whittington DPM, 2,000 Units at 01/13/24 0603    dextromethorphan-guaifenesin (Robitussin DM)  mg/5 mL oral liquid 5 mL, 5 mL, oral, q4h PRN, Bk Whittington DPM    dextrose 10 % in water (D10W) infusion, 0.3 g/kg/hr, intravenous, Once PRN, Bk Whittington DPM    dextrose 50 % injection 25 g, 25 g, intravenous, q15 min PRN, Bk Whittington DPM    dicyclomine (Bentyl) capsule 10 mg, 10 mg, oral, 4x daily, Bk Whittington DPM, 10 mg at 01/13/24 0603    estradiol (Estrace) 0.01 % (0.1 mg/gram) vaginal cream 2 g, 2 g, vaginal, Nightly, Bk Whittington DPM    gabapentin (Neurontin) capsule 300 mg, 300 mg, oral, BID, Bk Whittington DPM, 300 mg at 01/13/24 0942    glucagon (Glucagen) injection 1 mg, 1 mg, intramuscular, q15 min PRN, Bk Whittington DPM    guaiFENesin (Mucinex) 12 hr tablet 600 mg, 600 mg, oral, q12h PRN, Bk Whittington DPM    heparin (porcine) injection 5,000 Units, 5,000 Units, subcutaneous, q8h, GABBI SantanaCNP, 5,000 Units at 01/13/24 0603    insulin glargine (Lantus) injection 30 Units, 30 Units, subcutaneous, Nightly, TAL Santana-CNP, 30 Units at 01/12/24 2140    insulin lispro (HumaLOG) injection 0-15 Units, 0-15 Units, subcutaneous, TID with meals, Bk Whittington DPM, 6 Units at 01/13/24 0942    linezolid (Zyvox) tablet 600 mg, 600 mg, oral, q12h, Bk Whittington DPM, 600 mg at 01/13/24 0942    [Held by provider] magnesium oxide (Mag-Ox) tablet 400 mg, 400 mg, oral, Daily, Bk Whittington DPM, 400 mg at 01/12/24 0719    meclizine (Antivert) tablet 25 mg, 25 mg, oral, TID PRN, Bk Whittington DPM    multivitamin with minerals 1 tablet, 1 tablet, oral, Daily, Bk Whittington DPM, 1 tablet at 01/13/24 0942    ondansetron ODT  (Zofran-ODT) disintegrating tablet 4 mg, 4 mg, oral, q8h PRN, Bk Whittington DPM    pantoprazole (ProtoNix) EC tablet 40 mg, 40 mg, oral, Daily before breakfast, Bk Whittington DPM, 40 mg at 01/13/24 0603    polyethylene glycol (Glycolax, Miralax) packet 17 g, 17 g, oral, Daily PRN, Bk Whittington DPM    sennosides-docusate sodium (Doris-Colace) 8.6-50 mg per tablet 2 tablet, 2 tablet, oral, Nightly, Bk Whittington DPM, 2 tablet at 01/12/24 2138    sodium bicarbonate 75 mEq in 1000 mL sodium chloride 0.45% solution, 80 mL/hr, intravenous, Continuous, Bk Whittington DPM, Last Rate: 80 mL/hr at 01/13/24 0940, 80 mL/hr at 01/13/24 0940   Relevant Results    Results for orders placed or performed during the hospital encounter of 01/09/24 (from the past 96 hour(s))   POCT GLUCOSE   Result Value Ref Range    POCT Glucose 405 (H) 74 - 99 mg/dL   POCT GLUCOSE   Result Value Ref Range    POCT Glucose 271 (H) 74 - 99 mg/dL   Vancomycin   Result Value Ref Range    Vancomycin 10.6 10.0 - 20.0 ug/mL   Comprehensive metabolic panel   Result Value Ref Range    Glucose 151 (H) 65 - 99 mg/dL    Sodium 134 133 - 145 mmol/L    Potassium 3.3 (L) 3.4 - 5.1 mmol/L    Chloride 102 97 - 107 mmol/L    Bicarbonate 23 (L) 24 - 31 mmol/L    Urea Nitrogen 14 8 - 25 mg/dL    Creatinine 1.50 0.40 - 1.60 mg/dL    eGFR 40 (L) >60 mL/min/1.73m*2    Calcium 8.5 8.5 - 10.4 mg/dL    Albumin 2.6 (L) 3.5 - 5.0 g/dL    Alkaline Phosphatase 77 35 - 125 U/L    Total Protein 5.8 (L) 5.9 - 7.9 g/dL    AST 5 5 - 40 U/L    Bilirubin, Total 0.2 0.1 - 1.2 mg/dL    ALT <5 (L) 5 - 40 U/L    Anion Gap 9 <=19 mmol/L   CBC   Result Value Ref Range    WBC 11.1 4.4 - 11.3 x10*3/uL    nRBC 0.0 0.0 - 0.0 /100 WBCs    RBC 3.01 (L) 4.00 - 5.20 x10*6/uL    Hemoglobin 9.0 (L) 12.0 - 16.0 g/dL    Hematocrit 27.0 (L) 36.0 - 46.0 %    MCV 90 80 - 100 fL    MCH 29.9 26.0 - 34.0 pg    MCHC 33.3 32.0 - 36.0 g/dL    RDW 12.2 11.5 - 14.5 %    Platelets 316 150 - 450  x10*3/uL   POCT GLUCOSE   Result Value Ref Range    POCT Glucose 153 (H) 74 - 99 mg/dL   POCT GLUCOSE   Result Value Ref Range    POCT Glucose 140 (H) 74 - 99 mg/dL   POCT GLUCOSE   Result Value Ref Range    POCT Glucose 93 74 - 99 mg/dL   POCT GLUCOSE   Result Value Ref Range    POCT Glucose 89 74 - 99 mg/dL   Tissue/Wound Culture/Smear    Specimen: Diabetic Foot Ulcer; Tissue/Biopsy   Result Value Ref Range    Tissue/Wound Culture/Smear (A)      (1+) Rare Methicillin Susceptible Staphylococcus aureus (MSSA)    Tissue/Wound Culture/Smear (2+) Few Mixed Gram-Positive Bacteria     Tissue/Wound Culture/Smear (2+) Few Candida glabrata (A)     Gram Stain No polymorphonuclear leukocytes seen (A)     Gram Stain (2+) Few Yeast (A)     Gram Stain (2+) Few Gram positive cocci (A)        Susceptibility    Methicillin Susceptible Staphylococcus aureus (MSSA) - MICROSCAN     Clindamycin  Resistant      Erythromycin  Resistant      Oxacillin  Susceptible      Tetracycline  Susceptible      Trimethoprim/Sulfamethoxazole  Susceptible      Vancomycin  Susceptible    POCT GLUCOSE   Result Value Ref Range    POCT Glucose 99 74 - 99 mg/dL   POCT GLUCOSE   Result Value Ref Range    POCT Glucose 138 (H) 74 - 99 mg/dL   POCT GLUCOSE   Result Value Ref Range    POCT Glucose 220 (H) 74 - 99 mg/dL   Urinalysis with Reflex Microscopic   Result Value Ref Range    Color, Urine Light-Yellow Light-Yellow, Yellow, Dark-Yellow    Appearance, Urine Turbid (N) Clear    Specific Gravity, Urine 1.027 1.005 - 1.035    pH, Urine 5.5 5.0, 5.5, 6.0, 6.5, 7.0, 7.5, 8.0    Protein, Urine 70 (1+) (A) NEGATIVE, 10 (TRACE), 20 (TRACE) mg/dL    Glucose, Urine OVER (4+) (A) Normal mg/dL    Blood, Urine 0.03 (TRACE) (A) NEGATIVE    Ketones, Urine NEGATIVE NEGATIVE mg/dL    Bilirubin, Urine NEGATIVE NEGATIVE    Urobilinogen, Urine Normal Normal mg/dL    Nitrite, Urine NEGATIVE NEGATIVE    Leukocyte Esterase, Urine 500 Rashi/µL (A) NEGATIVE   Microscopic Only, Urine    Result Value Ref Range    WBC, Urine 1-5 1-5, NONE /HPF    RBC, Urine NONE NONE, 1-2, 3-5 /HPF    Squamous Epithelial Cells, Urine 26-50 (1+) Reference range not established. /HPF    Bacteria, Urine 1+ (A) NONE SEEN /HPF    Budding Yeast, Urine PRESENT (A) NONE /HPF    Yeast Hyphae, Urine PRESENT (A) NONE /HPF    Mucus, Urine FEW Reference range not established. /LPF   POCT GLUCOSE   Result Value Ref Range    POCT Glucose 270 (H) 74 - 99 mg/dL   POCT GLUCOSE   Result Value Ref Range    POCT Glucose 377 (H) 74 - 99 mg/dL   Creatinine, Serum   Result Value Ref Range    Creatinine 2.50 (H) 0.40 - 1.60 mg/dL    eGFR 22 (L) >60 mL/min/1.73m*2   Comprehensive Metabolic Panel   Result Value Ref Range    Glucose 381 (H) 65 - 99 mg/dL    Sodium 129 (L) 133 - 145 mmol/L    Potassium 3.6 3.4 - 5.1 mmol/L    Chloride 97 97 - 107 mmol/L    Bicarbonate 19 (L) 24 - 31 mmol/L    Urea Nitrogen 20 8 - 25 mg/dL    Creatinine 2.60 (H) 0.40 - 1.60 mg/dL    eGFR 21 (L) >60 mL/min/1.73m*2    Calcium 8.6 8.5 - 10.4 mg/dL    Albumin 2.5 (L) 3.5 - 5.0 g/dL    Alkaline Phosphatase 73 35 - 125 U/L    Total Protein 6.3 5.9 - 7.9 g/dL    AST 8 5 - 40 U/L    Bilirubin, Total <0.2 0.1 - 1.2 mg/dL    ALT 6 5 - 40 U/L    Anion Gap 13 <=19 mmol/L   Creatine Kinase   Result Value Ref Range    Creatine Kinase 37 24 - 195 U/L   Lavender Top   Result Value Ref Range    Extra Tube Hold for add-ons.    POCT GLUCOSE   Result Value Ref Range    POCT Glucose 192 (H) 74 - 99 mg/dL   POCT GLUCOSE   Result Value Ref Range    POCT Glucose 190 (H) 74 - 99 mg/dL   Urinalysis with Reflex Culture and Microscopic   Result Value Ref Range    Color, Urine Light-Yellow Light-Yellow, Yellow, Dark-Yellow    Appearance, Urine Turbid (N) Clear    Specific Gravity, Urine 1.017 1.005 - 1.035    pH, Urine 5.0 5.0, 5.5, 6.0, 6.5, 7.0, 7.5, 8.0    Protein, Urine 70 (1+) (A) NEGATIVE, 10 (TRACE), 20 (TRACE) mg/dL    Glucose, Urine 1000 (4+) (A) Normal mg/dL    Blood, Urine 0.03  (TRACE) (A) NEGATIVE    Ketones, Urine TRACE (A) NEGATIVE mg/dL    Bilirubin, Urine NEGATIVE NEGATIVE    Urobilinogen, Urine Normal Normal mg/dL    Nitrite, Urine NEGATIVE NEGATIVE    Leukocyte Esterase, Urine 500 Rashi/µL (A) NEGATIVE   Extra Urine Gray Tube   Result Value Ref Range    Extra Tube Hold for add-ons.    Microscopic Only, Urine   Result Value Ref Range    WBC, Urine 1-5 1-5, NONE /HPF    RBC, Urine 11-20 (A) NONE, 1-2, 3-5 /HPF    Squamous Epithelial Cells, Urine 26-50 (1+) Reference range not established. /HPF    Bacteria, Urine 1+ (A) NONE SEEN /HPF    Budding Yeast, Urine PRESENT (A) NONE /HPF   Urine Culture    Specimen: Clean Catch/Voided; Urine   Result Value Ref Range    Urine Culture Normal genitourinary breanna    POCT GLUCOSE   Result Value Ref Range    POCT Glucose 254 (H) 74 - 99 mg/dL   Renal Function Panel   Result Value Ref Range    Glucose 248 (H) 65 - 99 mg/dL    Sodium 133 133 - 145 mmol/L    Potassium 3.4 3.4 - 5.1 mmol/L    Chloride 99 97 - 107 mmol/L    Bicarbonate 18 (L) 24 - 31 mmol/L    Urea Nitrogen 25 8 - 25 mg/dL    Creatinine 4.20 (H) 0.40 - 1.60 mg/dL    eGFR 12 (L) >60 mL/min/1.73m*2    Calcium 8.2 (L) 8.5 - 10.4 mg/dL    Phosphorus 4.4 2.5 - 4.5 mg/dL    Albumin 2.4 (L) 3.5 - 5.0 g/dL    Anion Gap 16 <=19 mmol/L   CBC   Result Value Ref Range    WBC 8.7 4.4 - 11.3 x10*3/uL    nRBC 0.0 0.0 - 0.0 /100 WBCs    RBC 3.13 (L) 4.00 - 5.20 x10*6/uL    Hemoglobin 9.4 (L) 12.0 - 16.0 g/dL    Hematocrit 28.5 (L) 36.0 - 46.0 %    MCV 91 80 - 100 fL    MCH 30.0 26.0 - 34.0 pg    MCHC 33.0 32.0 - 36.0 g/dL    RDW 12.1 11.5 - 14.5 %    Platelets 385 150 - 450 x10*3/uL   POCT GLUCOSE   Result Value Ref Range    POCT Glucose 227 (H) 74 - 99 mg/dL   POCT GLUCOSE   Result Value Ref Range    POCT Glucose 229 (H) 74 - 99 mg/dL   POCT GLUCOSE   Result Value Ref Range    POCT Glucose 232 (H) 74 - 99 mg/dL   Tissue/Wound Culture/Smear    Specimen: DIGIT FIRST, RIGHT FOOT; Tissue   Result Value Ref  Range    Tissue/Wound Culture/Smear No growth to date     Gram Stain No polymorphonuclear leukocytes seen     Gram Stain No organisms seen    Tissue/Wound Culture/Smear    Specimen: DIGIT FIRST, RIGHT FOOT; Tissue   Result Value Ref Range    Tissue/Wound Culture/Smear No growth to date     Gram Stain No organisms seen     Gram Stain (1+) Rare Polymorphonuclear leukocytes    POCT GLUCOSE   Result Value Ref Range    POCT Glucose 233 (H) 74 - 99 mg/dL   POCT GLUCOSE   Result Value Ref Range    POCT Glucose 230 (H) 74 - 99 mg/dL   Renal Function Panel   Result Value Ref Range    Glucose 277 (H) 65 - 99 mg/dL    Sodium 132 (L) 133 - 145 mmol/L    Potassium 3.7 3.4 - 5.1 mmol/L    Chloride 98 97 - 107 mmol/L    Bicarbonate 17 (L) 24 - 31 mmol/L    Urea Nitrogen 30 (H) 8 - 25 mg/dL    Creatinine 5.30 (H) 0.40 - 1.60 mg/dL    eGFR 9 (L) >60 mL/min/1.73m*2    Calcium 8.0 (L) 8.5 - 10.4 mg/dL    Phosphorus 5.2 (H) 2.5 - 4.5 mg/dL    Albumin 2.4 (L) 3.5 - 5.0 g/dL    Anion Gap 17 <=19 mmol/L   CBC   Result Value Ref Range    WBC 9.7 4.4 - 11.3 x10*3/uL    nRBC 0.0 0.0 - 0.0 /100 WBCs    RBC 3.21 (L) 4.00 - 5.20 x10*6/uL    Hemoglobin 9.5 (L) 12.0 - 16.0 g/dL    Hematocrit 29.4 (L) 36.0 - 46.0 %    MCV 92 80 - 100 fL    MCH 29.6 26.0 - 34.0 pg    MCHC 32.3 32.0 - 36.0 g/dL    RDW 12.3 11.5 - 14.5 %    Platelets 424 150 - 450 x10*3/uL   POCT GLUCOSE   Result Value Ref Range    POCT Glucose 210 (H) 74 - 99 mg/dL   POCT GLUCOSE   Result Value Ref Range    POCT Glucose 124 (H) 74 - 99 mg/dL       Assessment/Plan   1.  Acute kidney injury  - Likely acute tubular necrosis multifactorial in the setting of hypotension, infection, IV contrast, Vancomycin and Zosyn  Renal function continue to deteriorate the patient is totally asymptomatic my plan is to give it 1 more day and monitor her renal function if renal function is worse tomorrow and acidosis is worse she may very well need dialysis therapy she understand that and she consented  for that  2.  Chronic kidney disease stage III  - baseline Cr 1.2-1.5  3.  Cellulitis of the foot  4.  Diabetes mellitus               Brandt Leon MD

## 2024-01-13 NOTE — CARE PLAN
Problem: Pain  Goal: My pain/discomfort is manageable  Outcome: Progressing     Problem: Safety  Goal: Patient will be injury free during hospitalization  Outcome: Progressing   The patient's goals for the shift include Safety    The clinical goals for the shift include Remain free from falls/injury/pain

## 2024-01-13 NOTE — PROGRESS NOTES
Occupational Therapy    OT Treatment    Patient Name: Nuris Squires  MRN: [id number]  Today's Date: 1/13/2024                General:  General  Missed Visit: Yes  Missed Visit Reason:  (Pt is post op amputation, will need new therapy orders.)

## 2024-01-13 NOTE — PROGRESS NOTES
BRIEF  ID  CHART  REVIEW  NOTE      Chart reviewed  Vital signs stable  Detailed operative note reviewed  Multiple cultures pending  Tolerating antibiotic regimen, cefepime and linezolid  Will reassess 1/14, please call sooner prn    Lucho Hiceky MD  [facility]  Office:  [phone]

## 2024-01-13 NOTE — CARE PLAN
Problem: Pain  Goal: My pain/discomfort is manageable  Outcome: Progressing     Problem: Safety  Goal: Patient will be injury free during hospitalization  Outcome: Progressing     Problem: Daily Care  Goal: Daily care needs are met  Outcome: Progressing     Problem: Psychosocial Needs  Goal: Demonstrates ability to cope with hospitalization/illness  Outcome: Progressing  Goal: Collaborate with me, my family, and caregiver to identify my specific goals  Outcome: Progressing     Problem: Discharge Barriers  Goal: My discharge needs are met  Outcome: Progressing     Problem: Skin  Goal: Prevent/manage excess moisture  Outcome: Progressing  Goal: Prevent/minimize sheer/friction injuries  Outcome: Progressing     Problem: Diabetes  Goal: Achieve decreasing blood glucose levels by end of shift  Outcome: Progressing  Goal: Increase stability of blood glucose readings by end of shift  Outcome: Progressing  Goal: Decrease in ketones present in urine by end of shift  Outcome: Progressing  Goal: Maintain electrolyte levels within acceptable range throughout shift  Outcome: Progressing  Goal: Maintain glucose levels >70mg/dl to <250mg/dl throughout shift  Outcome: Progressing  Goal: Learn about and adhere to nutrition recommendations by end of shift  Outcome: Progressing  Goal: Receive DSME education by end of shift  Outcome: Progressing   The patient's goals for the shift include Safety    The clinical goals for the shift include PT WILL NOT REPORT PAIN LEVEL GREATER THAN 5/10 THIS SHIFT

## 2024-01-14 LAB
ALBUMIN SERPL-MCNC: 2.6 G/DL (ref 3.5–5)
ANION GAP SERPL CALC-SCNC: 15 MMOL/L
BACTERIA SPEC CULT: NORMAL
BASOPHILS # BLD AUTO: 0.05 X10*3/UL (ref 0–0.1)
BASOPHILS NFR BLD AUTO: 0.6 %
BUN SERPL-MCNC: 33 MG/DL (ref 8–25)
CALCIUM SERPL-MCNC: 8.5 MG/DL (ref 8.5–10.4)
CHLORIDE SERPL-SCNC: 102 MMOL/L (ref 97–107)
CO2 SERPL-SCNC: 20 MMOL/L (ref 24–31)
CREAT SERPL-MCNC: 5.3 MG/DL (ref 0.4–1.6)
EGFRCR SERPLBLD CKD-EPI 2021: 9 ML/MIN/1.73M*2
EOSINOPHIL # BLD AUTO: 0.25 X10*3/UL (ref 0–0.7)
EOSINOPHIL NFR BLD AUTO: 3 %
ERYTHROCYTE [DISTWIDTH] IN BLOOD BY AUTOMATED COUNT: 12.6 % (ref 11.5–14.5)
GLUCOSE BLD MANUAL STRIP-MCNC: 133 MG/DL (ref 74–99)
GLUCOSE BLD MANUAL STRIP-MCNC: 167 MG/DL (ref 74–99)
GLUCOSE BLD MANUAL STRIP-MCNC: 188 MG/DL (ref 74–99)
GLUCOSE BLD MANUAL STRIP-MCNC: 216 MG/DL (ref 74–99)
GLUCOSE SERPL-MCNC: 181 MG/DL (ref 65–99)
GRAM STN SPEC: NORMAL
GRAM STN SPEC: NORMAL
HCT VFR BLD AUTO: 28.1 % (ref 36–46)
HGB BLD-MCNC: 9.3 G/DL (ref 12–16)
IMM GRANULOCYTES # BLD AUTO: 0.03 X10*3/UL (ref 0–0.7)
IMM GRANULOCYTES NFR BLD AUTO: 0.4 % (ref 0–0.9)
LYMPHOCYTES # BLD AUTO: 1.73 X10*3/UL (ref 1.2–4.8)
LYMPHOCYTES NFR BLD AUTO: 20.9 %
MCH RBC QN AUTO: 29.5 PG (ref 26–34)
MCHC RBC AUTO-ENTMCNC: 33.1 G/DL (ref 32–36)
MCV RBC AUTO: 89 FL (ref 80–100)
MONOCYTES # BLD AUTO: 0.69 X10*3/UL (ref 0.1–1)
MONOCYTES NFR BLD AUTO: 8.3 %
NEUTROPHILS # BLD AUTO: 5.54 X10*3/UL (ref 1.2–7.7)
NEUTROPHILS NFR BLD AUTO: 66.8 %
NRBC BLD-RTO: 0 /100 WBCS (ref 0–0)
PHOSPHATE SERPL-MCNC: 5.1 MG/DL (ref 2.5–4.5)
PLATELET # BLD AUTO: 427 X10*3/UL (ref 150–450)
POTASSIUM SERPL-SCNC: 3.5 MMOL/L (ref 3.4–5.1)
RBC # BLD AUTO: 3.15 X10*6/UL (ref 4–5.2)
SODIUM SERPL-SCNC: 137 MMOL/L (ref 133–145)
WBC # BLD AUTO: 8.3 X10*3/UL (ref 4.4–11.3)

## 2024-01-14 PROCEDURE — 2500000004 HC RX 250 GENERAL PHARMACY W/ HCPCS (ALT 636 FOR OP/ED): Performed by: STUDENT IN AN ORGANIZED HEALTH CARE EDUCATION/TRAINING PROGRAM

## 2024-01-14 PROCEDURE — 2500000001 HC RX 250 WO HCPCS SELF ADMINISTERED DRUGS (ALT 637 FOR MEDICARE OP): Performed by: STUDENT IN AN ORGANIZED HEALTH CARE EDUCATION/TRAINING PROGRAM

## 2024-01-14 PROCEDURE — 80069 RENAL FUNCTION PANEL: CPT | Performed by: STUDENT IN AN ORGANIZED HEALTH CARE EDUCATION/TRAINING PROGRAM

## 2024-01-14 PROCEDURE — 82947 ASSAY GLUCOSE BLOOD QUANT: CPT

## 2024-01-14 PROCEDURE — 2500000004 HC RX 250 GENERAL PHARMACY W/ HCPCS (ALT 636 FOR OP/ED): Performed by: NURSE PRACTITIONER

## 2024-01-14 PROCEDURE — 85025 COMPLETE CBC W/AUTO DIFF WBC: CPT | Performed by: INTERNAL MEDICINE

## 2024-01-14 PROCEDURE — 2500000002 HC RX 250 W HCPCS SELF ADMINISTERED DRUGS (ALT 637 FOR MEDICARE OP, ALT 636 FOR OP/ED): Performed by: INTERNAL MEDICINE

## 2024-01-14 PROCEDURE — 1200000002 HC GENERAL ROOM WITH TELEMETRY DAILY

## 2024-01-14 RX ORDER — INSULIN GLARGINE 100 [IU]/ML
45 INJECTION, SOLUTION SUBCUTANEOUS NIGHTLY
Status: DISCONTINUED | OUTPATIENT
Start: 2024-01-14 | End: 2024-01-18 | Stop reason: HOSPADM

## 2024-01-14 RX ORDER — INSULIN LISPRO 100 [IU]/ML
5 INJECTION, SOLUTION INTRAVENOUS; SUBCUTANEOUS
Status: DISCONTINUED | OUTPATIENT
Start: 2024-01-14 | End: 2024-01-18 | Stop reason: HOSPADM

## 2024-01-14 RX ADMIN — Medication 1 TABLET: at 09:41

## 2024-01-14 RX ADMIN — HEPARIN SODIUM 5000 UNITS: 5000 INJECTION, SOLUTION INTRAVENOUS; SUBCUTANEOUS at 22:25

## 2024-01-14 RX ADMIN — SODIUM BICARBONATE 80 ML/HR: 84 INJECTION, SOLUTION INTRAVENOUS at 17:38

## 2024-01-14 RX ADMIN — CEFEPIME 1 G: 1 INJECTION, POWDER, FOR SOLUTION INTRAMUSCULAR; INTRAVENOUS at 05:48

## 2024-01-14 RX ADMIN — HEPARIN SODIUM 5000 UNITS: 5000 INJECTION, SOLUTION INTRAVENOUS; SUBCUTANEOUS at 14:00

## 2024-01-14 RX ADMIN — ASPIRIN 81 MG: 81 TABLET, CHEWABLE ORAL at 09:41

## 2024-01-14 RX ADMIN — PANTOPRAZOLE SODIUM 40 MG: 40 TABLET, DELAYED RELEASE ORAL at 06:47

## 2024-01-14 RX ADMIN — DICYCLOMINE HYDROCHLORIDE 10 MG: 10 CAPSULE ORAL at 12:33

## 2024-01-14 RX ADMIN — INSULIN LISPRO 3 UNITS: 100 INJECTION, SOLUTION INTRAVENOUS; SUBCUTANEOUS at 18:13

## 2024-01-14 RX ADMIN — ATORVASTATIN CALCIUM 40 MG: 40 TABLET, FILM COATED ORAL at 22:25

## 2024-01-14 RX ADMIN — SODIUM BICARBONATE 80 ML/HR: 84 INJECTION, SOLUTION INTRAVENOUS at 18:13

## 2024-01-14 RX ADMIN — GABAPENTIN 300 MG: 300 CAPSULE ORAL at 09:41

## 2024-01-14 RX ADMIN — INSULIN LISPRO 5 UNITS: 100 INJECTION, SOLUTION INTRAVENOUS; SUBCUTANEOUS at 18:22

## 2024-01-14 RX ADMIN — DICYCLOMINE HYDROCHLORIDE 10 MG: 10 CAPSULE ORAL at 22:25

## 2024-01-14 RX ADMIN — HEPARIN SODIUM 5000 UNITS: 5000 INJECTION, SOLUTION INTRAVENOUS; SUBCUTANEOUS at 06:47

## 2024-01-14 RX ADMIN — INSULIN LISPRO 3 UNITS: 100 INJECTION, SOLUTION INTRAVENOUS; SUBCUTANEOUS at 12:38

## 2024-01-14 RX ADMIN — GABAPENTIN 300 MG: 300 CAPSULE ORAL at 22:25

## 2024-01-14 RX ADMIN — Medication 2000 UNITS: at 06:47

## 2024-01-14 RX ADMIN — DICYCLOMINE HYDROCHLORIDE 10 MG: 10 CAPSULE ORAL at 17:37

## 2024-01-14 RX ADMIN — DICYCLOMINE HYDROCHLORIDE 10 MG: 10 CAPSULE ORAL at 06:47

## 2024-01-14 RX ADMIN — INSULIN GLARGINE 45 UNITS: 100 INJECTION, SOLUTION SUBCUTANEOUS at 22:25

## 2024-01-14 ASSESSMENT — COGNITIVE AND FUNCTIONAL STATUS - GENERAL
DAILY ACTIVITIY SCORE: 24
CLIMB 3 TO 5 STEPS WITH RAILING: A LITTLE
MOBILITY SCORE: 22
WALKING IN HOSPITAL ROOM: A LITTLE
DAILY ACTIVITIY SCORE: 24
MOBILITY SCORE: 22
WALKING IN HOSPITAL ROOM: A LITTLE
CLIMB 3 TO 5 STEPS WITH RAILING: A LITTLE

## 2024-01-14 ASSESSMENT — PAIN - FUNCTIONAL ASSESSMENT: PAIN_FUNCTIONAL_ASSESSMENT: 0-10

## 2024-01-14 ASSESSMENT — PAIN SCALES - GENERAL
PAINLEVEL_OUTOF10: 0 - NO PAIN
PAINLEVEL_OUTOF10: 0 - NO PAIN

## 2024-01-14 NOTE — PROGRESS NOTES
"Nuris Squires is a 58 y.o. female on day 3 of admission presenting with Right foot infection.    Subjective   Patient seen at bedside. Reports no pain to the operative limb. No constitutional symptoms at this time.          Physical Exam    Objective     General: Patient seen resting in bed. In NAD with dressing intact to right foot without strikethrough.     Objective:      Vasc: DP and PT pulses palpable b/l. CFT is less than 3 seconds bilateral.  Skin temperature is warm to cool proximal to distal bilateral. Focal increase in temperature to right dorsal foot.      Derm: Surgical incision to dorsal right foot well approximated with sutures intact. Minimal selene-incisional maceration. No signs of dehiscence. No purulent drainage. No sings of necrosis. Previous erythema significantly improved. With decreased calor.      Neuro:  Light touch absent to the foot bilateral.       Ortho: Muscle strength is 5/5 for all pedal groups tested. No pain with palpation of b/l lower extremities.      Last Recorded Vitals  Blood pressure 150/75, pulse 77, temperature 36.9 °C (98.4 °F), temperature source Oral, resp. rate 18, height 1.575 m (5' 2\"), weight 102 kg (225 lb), SpO2 100 %.    Intake/Output last 3 Shifts:  I/O last 3 completed shifts:  In: 1798 (17.6 mL/kg) [P.O.:240; IV Piggyback:1558]  Out: - (0 mL/kg)   Weight: 102.1 kg     Relevant Results  Scheduled medications  aspirin, 81 mg, oral, Daily  atorvastatin, 40 mg, oral, Nightly  cefepime, 1 g, intravenous, q24h  cholecalciferol, 2,000 Units, oral, Daily  dicyclomine, 10 mg, oral, 4x daily  estradiol, 2 g, vaginal, Nightly  gabapentin, 300 mg, oral, BID  heparin, 5,000 Units, subcutaneous, q8h  insulin glargine, 30 Units, subcutaneous, Nightly  insulin lispro, 0-15 Units, subcutaneous, TID with meals  [Held by provider] magnesium oxide, 400 mg, oral, Daily  multivitamin with minerals, 1 tablet, oral, Daily  pantoprazole, 40 mg, oral, Daily before " breakfast  sennosides-docusate sodium, 2 tablet, oral, Nightly      Continuous medications  sodium bicarbonate 75 mEq, 80 mL/hr, Last Rate: 80 mL/hr (01/13/24 1747)      PRN medications  PRN medications: acetaminophen **OR** acetaminophen **OR** acetaminophen, benzocaine-menthol, dextromethorphan-guaifenesin, dextrose 10 % in water (D10W), dextrose, glucagon, guaiFENesin, meclizine, ondansetron ODT, polyethylene glycol   Results for orders placed or performed during the hospital encounter of 01/09/24 (from the past 24 hour(s))   POCT GLUCOSE   Result Value Ref Range    POCT Glucose 159 (H) 74 - 99 mg/dL   Renal Function Panel   Result Value Ref Range    Glucose 181 (H) 65 - 99 mg/dL    Sodium 137 133 - 145 mmol/L    Potassium 3.5 3.4 - 5.1 mmol/L    Chloride 102 97 - 107 mmol/L    Bicarbonate 20 (L) 24 - 31 mmol/L    Urea Nitrogen 33 (H) 8 - 25 mg/dL    Creatinine 5.30 (H) 0.40 - 1.60 mg/dL    eGFR 9 (L) >60 mL/min/1.73m*2    Calcium 8.5 8.5 - 10.4 mg/dL    Phosphorus 5.1 (H) 2.5 - 4.5 mg/dL    Albumin 2.6 (L) 3.5 - 5.0 g/dL    Anion Gap 15 <=19 mmol/L   CBC and Auto Differential   Result Value Ref Range    WBC 8.3 4.4 - 11.3 x10*3/uL    nRBC 0.0 0.0 - 0.0 /100 WBCs    RBC 3.15 (L) 4.00 - 5.20 x10*6/uL    Hemoglobin 9.3 (L) 12.0 - 16.0 g/dL    Hematocrit 28.1 (L) 36.0 - 46.0 %    MCV 89 80 - 100 fL    MCH 29.5 26.0 - 34.0 pg    MCHC 33.1 32.0 - 36.0 g/dL    RDW 12.6 11.5 - 14.5 %    Platelets 427 150 - 450 x10*3/uL    Neutrophils % 66.8 40.0 - 80.0 %    Immature Granulocytes %, Automated 0.4 0.0 - 0.9 %    Lymphocytes % 20.9 13.0 - 44.0 %    Monocytes % 8.3 2.0 - 10.0 %    Eosinophils % 3.0 0.0 - 6.0 %    Basophils % 0.6 0.0 - 2.0 %    Neutrophils Absolute 5.54 1.20 - 7.70 x10*3/uL    Immature Granulocytes Absolute, Automated 0.03 0.00 - 0.70 x10*3/uL    Lymphocytes Absolute 1.73 1.20 - 4.80 x10*3/uL    Monocytes Absolute 0.69 0.10 - 1.00 x10*3/uL    Eosinophils Absolute 0.25 0.00 - 0.70 x10*3/uL    Basophils  Absolute 0.05 0.00 - 0.10 x10*3/uL   POCT GLUCOSE   Result Value Ref Range    POCT Glucose 133 (H) 74 - 99 mg/dL   POCT GLUCOSE   Result Value Ref Range    POCT Glucose 188 (H) 74 - 99 mg/dL    Susceptibility data from last 90 days.  Collected Specimen Info Organism Clindamycin Erythromycin Oxacillin Tetracycline Trimethoprim/Sulfamethoxazole Vancomycin   01/10/24 Tissue/Biopsy from Diabetic Foot Ulcer Methicillin Susceptible Staphylococcus aureus (MSSA) R R S S S S     Candida glabrata           Mixed Gram-Positive Bacteria          01/09/24 Urine from Clean Catch/Voided Streptococcus agalactiae (Group B Streptococcus)                   Assessment/Plan   Principal Problem:    Right foot infection    Cellulitis right foot  Diabetes mellitus, poorly controled with foot ulceration  Hx of digital amputations left foot  Hx of Charcot Neuroarthropathy     Plan:     - Patient was seen and evaluated; all findings were discussed and all questions were answered to patient's satisfaction.  - Charts, labs, vitals and imaging all reviewed.   - Imaging: X-ray per radiology with no significant acute findings suggestive of osteomyelitis, crepitus, or air. CT reviewed with no signs of abscess.     - POD 2 from hallux amputation and midfoot bone biopsy, doing well without constitutional symptoms at this time  - Dressing change performed consisting of betadine paint, adaptic, 4x4 gauze, ABD and ACE.  - Will follow intraoperative specimen prior to discharge  -PVR/ALESIA reviewed with triphasic flow b/l       Pain regimen: Per Primary  ABX: Per infectious disease     - Weightbearing Status: Flat foot or heel weightbearing right foot with surgical shoe     - Podiatry will continue to follow while in house.     Bk Whittington DPM PGY3         Bk Whittington DPM

## 2024-01-14 NOTE — PROGRESS NOTES
"Nuris Squires is a 58 y.o. female on day 3 of admission presenting with Right foot infection.    Subjective   Patient seen for acute kidney injury admitted with infected red right foot status post surgery today patient feels fine without any complaints at all no uremic symptoms and no shortness of breath still nonoliguric       Objective     Physical Exam  Neck:      Vascular: No carotid bruit.   Cardiovascular:      Rate and Rhythm: Normal rate and regular rhythm.      Heart sounds: No murmur heard.     No friction rub. No gallop.   Pulmonary:      Breath sounds: No wheezing, rhonchi or rales.   Chest:      Chest wall: No tenderness.   Abdominal:      General: There is no distension.      Tenderness: There is no abdominal tenderness. There is no guarding or rebound.   Musculoskeletal:         General: No swelling or tenderness.      Cervical back: Neck supple.      Right lower leg: No edema.      Left lower leg: No edema.      Comments: Right foot surgery and dressing   Lymphadenopathy:      Cervical: No cervical adenopathy.         Last Recorded Vitals  Blood pressure 150/75, pulse 77, temperature 36.9 °C (98.4 °F), temperature source Oral, resp. rate 18, height 1.575 m (5' 2\"), weight 102 kg (225 lb), SpO2 100 %.    Intake/Output last 3 Shifts:  I/O last 3 completed shifts:  In: 1798 (17.6 mL/kg) [P.O.:240; IV Piggyback:1558]  Out: - (0 mL/kg)   Weight: 102.1 kg     Current Facility-Administered Medications:     acetaminophen (Tylenol) tablet 650 mg, 650 mg, oral, q4h PRN **OR** acetaminophen (Tylenol) oral liquid 650 mg, 650 mg, oral, q4h PRN **OR** acetaminophen (Tylenol) suppository 650 mg, 650 mg, rectal, q4h PRN, Bk Whittington DPM    aspirin chewable tablet 81 mg, 81 mg, oral, Daily, Bk Whittington DPM, 81 mg at 01/14/24 0941    atorvastatin (Lipitor) tablet 40 mg, 40 mg, oral, Nightly, Bk Whittington DPM, 40 mg at 01/13/24 2023    benzocaine-menthol (Cepastat Sore Throat) 15-3.6 mg lozenge 1 " lozenge, 1 lozenge, Mouth/Throat, q2h PRN, Bk Whittington DPM    cefepime (Maxipime) 1 g in dextrose 5 % 50 mL IV, 1 g, intravenous, q24h, Bk Whittington DPM, Stopped at 01/14/24 0618    cholecalciferol (Vitamin D-3) tablet 2,000 Units, 2,000 Units, oral, Daily, Bk Whittington DPM, 2,000 Units at 01/14/24 0647    dextromethorphan-guaifenesin (Robitussin DM)  mg/5 mL oral liquid 5 mL, 5 mL, oral, q4h PRN, Bk Whittington DPM    dextrose 10 % in water (D10W) infusion, 0.3 g/kg/hr, intravenous, Once PRN, Bk Whittington DPM    dextrose 50 % injection 25 g, 25 g, intravenous, q15 min PRN, Bk Whittington DPM    dicyclomine (Bentyl) capsule 10 mg, 10 mg, oral, 4x daily, Bk Whittington DPM, 10 mg at 01/14/24 0647    estradiol (Estrace) 0.01 % (0.1 mg/gram) vaginal cream 2 g, 2 g, vaginal, Nightly, Bk Whittington DPM    gabapentin (Neurontin) capsule 300 mg, 300 mg, oral, BID, Bk Whittington DPM, 300 mg at 01/14/24 0941    glucagon (Glucagen) injection 1 mg, 1 mg, intramuscular, q15 min PRN, Bk Whittington DPM    guaiFENesin (Mucinex) 12 hr tablet 600 mg, 600 mg, oral, q12h PRN, Bk Whittington DPM    heparin (porcine) injection 5,000 Units, 5,000 Units, subcutaneous, q8h, SDEA Santana, 5,000 Units at 01/14/24 0647    insulin glargine (Lantus) injection 30 Units, 30 Units, subcutaneous, Nightly, TAL Santana-CNP, 30 Units at 01/13/24 2024    insulin lispro (HumaLOG) injection 0-15 Units, 0-15 Units, subcutaneous, TID with meals, Bk Whittington DPM, 6 Units at 01/13/24 0942    [Held by provider] magnesium oxide (Mag-Ox) tablet 400 mg, 400 mg, oral, Daily, Bk Whittington DPM, 400 mg at 01/12/24 0719    meclizine (Antivert) tablet 25 mg, 25 mg, oral, TID PRN, Bk Whittington DPM    multivitamin with minerals 1 tablet, 1 tablet, oral, Daily, Bk Whittington DPM, 1 tablet at 01/14/24 0941    ondansetron ODT (Zofran-ODT) disintegrating tablet 4 mg, 4 mg, oral,  q8h PRN, Bk Whittington DPM    pantoprazole (ProtoNix) EC tablet 40 mg, 40 mg, oral, Daily before breakfast, Bk Whittington DPM, 40 mg at 01/14/24 0647    polyethylene glycol (Glycolax, Miralax) packet 17 g, 17 g, oral, Daily PRN, Bk Whittington DPM    sennosides-docusate sodium (Doris-Colace) 8.6-50 mg per tablet 2 tablet, 2 tablet, oral, Nightly, Bk Whittington DPM, 2 tablet at 01/12/24 2138    sodium bicarbonate 75 mEq in 1000 mL sodium chloride 0.45% solution, 80 mL/hr, intravenous, Continuous, Bk Whittington DPM, Last Rate: 80 mL/hr at 01/13/24 1747, 80 mL/hr at 01/13/24 1747   Relevant Results    Results for orders placed or performed during the hospital encounter of 01/09/24 (from the past 96 hour(s))   POCT GLUCOSE   Result Value Ref Range    POCT Glucose 93 74 - 99 mg/dL   POCT GLUCOSE   Result Value Ref Range    POCT Glucose 89 74 - 99 mg/dL   Tissue/Wound Culture/Smear    Specimen: Diabetic Foot Ulcer; Tissue/Biopsy   Result Value Ref Range    Tissue/Wound Culture/Smear (A)      (1+) Rare Methicillin Susceptible Staphylococcus aureus (MSSA)    Tissue/Wound Culture/Smear (2+) Few Mixed Gram-Positive Bacteria     Tissue/Wound Culture/Smear (2+) Few Candida glabrata (A)     Gram Stain No polymorphonuclear leukocytes seen (A)     Gram Stain (2+) Few Yeast (A)     Gram Stain (2+) Few Gram positive cocci (A)        Susceptibility    Methicillin Susceptible Staphylococcus aureus (MSSA) - MICROSCAN     Clindamycin  Resistant      Erythromycin  Resistant      Oxacillin  Susceptible      Tetracycline  Susceptible      Trimethoprim/Sulfamethoxazole  Susceptible      Vancomycin  Susceptible    POCT GLUCOSE   Result Value Ref Range    POCT Glucose 99 74 - 99 mg/dL   POCT GLUCOSE   Result Value Ref Range    POCT Glucose 138 (H) 74 - 99 mg/dL   POCT GLUCOSE   Result Value Ref Range    POCT Glucose 220 (H) 74 - 99 mg/dL   Urinalysis with Reflex Microscopic   Result Value Ref Range    Color, Urine  Light-Yellow Light-Yellow, Yellow, Dark-Yellow    Appearance, Urine Turbid (N) Clear    Specific Gravity, Urine 1.027 1.005 - 1.035    pH, Urine 5.5 5.0, 5.5, 6.0, 6.5, 7.0, 7.5, 8.0    Protein, Urine 70 (1+) (A) NEGATIVE, 10 (TRACE), 20 (TRACE) mg/dL    Glucose, Urine OVER (4+) (A) Normal mg/dL    Blood, Urine 0.03 (TRACE) (A) NEGATIVE    Ketones, Urine NEGATIVE NEGATIVE mg/dL    Bilirubin, Urine NEGATIVE NEGATIVE    Urobilinogen, Urine Normal Normal mg/dL    Nitrite, Urine NEGATIVE NEGATIVE    Leukocyte Esterase, Urine 500 Rashi/µL (A) NEGATIVE   Microscopic Only, Urine   Result Value Ref Range    WBC, Urine 1-5 1-5, NONE /HPF    RBC, Urine NONE NONE, 1-2, 3-5 /HPF    Squamous Epithelial Cells, Urine 26-50 (1+) Reference range not established. /HPF    Bacteria, Urine 1+ (A) NONE SEEN /HPF    Budding Yeast, Urine PRESENT (A) NONE /HPF    Yeast Hyphae, Urine PRESENT (A) NONE /HPF    Mucus, Urine FEW Reference range not established. /LPF   POCT GLUCOSE   Result Value Ref Range    POCT Glucose 270 (H) 74 - 99 mg/dL   POCT GLUCOSE   Result Value Ref Range    POCT Glucose 377 (H) 74 - 99 mg/dL   Creatinine, Serum   Result Value Ref Range    Creatinine 2.50 (H) 0.40 - 1.60 mg/dL    eGFR 22 (L) >60 mL/min/1.73m*2   Comprehensive Metabolic Panel   Result Value Ref Range    Glucose 381 (H) 65 - 99 mg/dL    Sodium 129 (L) 133 - 145 mmol/L    Potassium 3.6 3.4 - 5.1 mmol/L    Chloride 97 97 - 107 mmol/L    Bicarbonate 19 (L) 24 - 31 mmol/L    Urea Nitrogen 20 8 - 25 mg/dL    Creatinine 2.60 (H) 0.40 - 1.60 mg/dL    eGFR 21 (L) >60 mL/min/1.73m*2    Calcium 8.6 8.5 - 10.4 mg/dL    Albumin 2.5 (L) 3.5 - 5.0 g/dL    Alkaline Phosphatase 73 35 - 125 U/L    Total Protein 6.3 5.9 - 7.9 g/dL    AST 8 5 - 40 U/L    Bilirubin, Total <0.2 0.1 - 1.2 mg/dL    ALT 6 5 - 40 U/L    Anion Gap 13 <=19 mmol/L   Creatine Kinase   Result Value Ref Range    Creatine Kinase 37 24 - 195 U/L   Lavender Top   Result Value Ref Range    Extra Tube Hold  for add-ons.    POCT GLUCOSE   Result Value Ref Range    POCT Glucose 192 (H) 74 - 99 mg/dL   POCT GLUCOSE   Result Value Ref Range    POCT Glucose 190 (H) 74 - 99 mg/dL   Urinalysis with Reflex Culture and Microscopic   Result Value Ref Range    Color, Urine Light-Yellow Light-Yellow, Yellow, Dark-Yellow    Appearance, Urine Turbid (N) Clear    Specific Gravity, Urine 1.017 1.005 - 1.035    pH, Urine 5.0 5.0, 5.5, 6.0, 6.5, 7.0, 7.5, 8.0    Protein, Urine 70 (1+) (A) NEGATIVE, 10 (TRACE), 20 (TRACE) mg/dL    Glucose, Urine 1000 (4+) (A) Normal mg/dL    Blood, Urine 0.03 (TRACE) (A) NEGATIVE    Ketones, Urine TRACE (A) NEGATIVE mg/dL    Bilirubin, Urine NEGATIVE NEGATIVE    Urobilinogen, Urine Normal Normal mg/dL    Nitrite, Urine NEGATIVE NEGATIVE    Leukocyte Esterase, Urine 500 Rashi/µL (A) NEGATIVE   Extra Urine Gray Tube   Result Value Ref Range    Extra Tube Hold for add-ons.    Microscopic Only, Urine   Result Value Ref Range    WBC, Urine 1-5 1-5, NONE /HPF    RBC, Urine 11-20 (A) NONE, 1-2, 3-5 /HPF    Squamous Epithelial Cells, Urine 26-50 (1+) Reference range not established. /HPF    Bacteria, Urine 1+ (A) NONE SEEN /HPF    Budding Yeast, Urine PRESENT (A) NONE /HPF   Urine Culture    Specimen: Clean Catch/Voided; Urine   Result Value Ref Range    Urine Culture Normal genitourinary breanna    POCT GLUCOSE   Result Value Ref Range    POCT Glucose 254 (H) 74 - 99 mg/dL   Renal Function Panel   Result Value Ref Range    Glucose 248 (H) 65 - 99 mg/dL    Sodium 133 133 - 145 mmol/L    Potassium 3.4 3.4 - 5.1 mmol/L    Chloride 99 97 - 107 mmol/L    Bicarbonate 18 (L) 24 - 31 mmol/L    Urea Nitrogen 25 8 - 25 mg/dL    Creatinine 4.20 (H) 0.40 - 1.60 mg/dL    eGFR 12 (L) >60 mL/min/1.73m*2    Calcium 8.2 (L) 8.5 - 10.4 mg/dL    Phosphorus 4.4 2.5 - 4.5 mg/dL    Albumin 2.4 (L) 3.5 - 5.0 g/dL    Anion Gap 16 <=19 mmol/L   CBC   Result Value Ref Range    WBC 8.7 4.4 - 11.3 x10*3/uL    nRBC 0.0 0.0 - 0.0 /100 WBCs     RBC 3.13 (L) 4.00 - 5.20 x10*6/uL    Hemoglobin 9.4 (L) 12.0 - 16.0 g/dL    Hematocrit 28.5 (L) 36.0 - 46.0 %    MCV 91 80 - 100 fL    MCH 30.0 26.0 - 34.0 pg    MCHC 33.0 32.0 - 36.0 g/dL    RDW 12.1 11.5 - 14.5 %    Platelets 385 150 - 450 x10*3/uL   POCT GLUCOSE   Result Value Ref Range    POCT Glucose 227 (H) 74 - 99 mg/dL   POCT GLUCOSE   Result Value Ref Range    POCT Glucose 229 (H) 74 - 99 mg/dL   POCT GLUCOSE   Result Value Ref Range    POCT Glucose 232 (H) 74 - 99 mg/dL   AFB Culture/Smear    Specimen: DIGIT FIRST, RIGHT FOOT; Tissue   Result Value Ref Range    AFB Culture       Culture in progress and will be examined weekly. A result will be issued either when positive or after 8 weeks incubation.    AFB Stain No acid fast bacilli seen    Tissue/Wound Culture/Smear    Specimen: DIGIT FIRST, RIGHT FOOT; Tissue   Result Value Ref Range    Tissue/Wound Culture/Smear No growth to date     Gram Stain No polymorphonuclear leukocytes seen     Gram Stain No organisms seen    AFB Culture/Smear    Specimen: DIGIT FIRST, RIGHT FOOT; Tissue   Result Value Ref Range    AFB Culture       Culture in progress and will be examined weekly. A result will be issued either when positive or after 8 weeks incubation.    AFB Stain No acid fast bacilli seen    Tissue/Wound Culture/Smear    Specimen: DIGIT FIRST, RIGHT FOOT; Tissue   Result Value Ref Range    Tissue/Wound Culture/Smear No growth to date     Gram Stain No organisms seen     Gram Stain (1+) Rare Polymorphonuclear leukocytes    POCT GLUCOSE   Result Value Ref Range    POCT Glucose 233 (H) 74 - 99 mg/dL   POCT GLUCOSE   Result Value Ref Range    POCT Glucose 230 (H) 74 - 99 mg/dL   Renal Function Panel   Result Value Ref Range    Glucose 277 (H) 65 - 99 mg/dL    Sodium 132 (L) 133 - 145 mmol/L    Potassium 3.7 3.4 - 5.1 mmol/L    Chloride 98 97 - 107 mmol/L    Bicarbonate 17 (L) 24 - 31 mmol/L    Urea Nitrogen 30 (H) 8 - 25 mg/dL    Creatinine 5.30 (H) 0.40 - 1.60  mg/dL    eGFR 9 (L) >60 mL/min/1.73m*2    Calcium 8.0 (L) 8.5 - 10.4 mg/dL    Phosphorus 5.2 (H) 2.5 - 4.5 mg/dL    Albumin 2.4 (L) 3.5 - 5.0 g/dL    Anion Gap 17 <=19 mmol/L   CBC   Result Value Ref Range    WBC 9.7 4.4 - 11.3 x10*3/uL    nRBC 0.0 0.0 - 0.0 /100 WBCs    RBC 3.21 (L) 4.00 - 5.20 x10*6/uL    Hemoglobin 9.5 (L) 12.0 - 16.0 g/dL    Hematocrit 29.4 (L) 36.0 - 46.0 %    MCV 92 80 - 100 fL    MCH 29.6 26.0 - 34.0 pg    MCHC 32.3 32.0 - 36.0 g/dL    RDW 12.3 11.5 - 14.5 %    Platelets 424 150 - 450 x10*3/uL   POCT GLUCOSE   Result Value Ref Range    POCT Glucose 210 (H) 74 - 99 mg/dL   POCT GLUCOSE   Result Value Ref Range    POCT Glucose 124 (H) 74 - 99 mg/dL   POCT GLUCOSE   Result Value Ref Range    POCT Glucose 159 (H) 74 - 99 mg/dL   Renal Function Panel   Result Value Ref Range    Glucose 181 (H) 65 - 99 mg/dL    Sodium 137 133 - 145 mmol/L    Potassium 3.5 3.4 - 5.1 mmol/L    Chloride 102 97 - 107 mmol/L    Bicarbonate 20 (L) 24 - 31 mmol/L    Urea Nitrogen 33 (H) 8 - 25 mg/dL    Creatinine 5.30 (H) 0.40 - 1.60 mg/dL    eGFR 9 (L) >60 mL/min/1.73m*2    Calcium 8.5 8.5 - 10.4 mg/dL    Phosphorus 5.1 (H) 2.5 - 4.5 mg/dL    Albumin 2.6 (L) 3.5 - 5.0 g/dL    Anion Gap 15 <=19 mmol/L   CBC and Auto Differential   Result Value Ref Range    WBC 8.3 4.4 - 11.3 x10*3/uL    nRBC 0.0 0.0 - 0.0 /100 WBCs    RBC 3.15 (L) 4.00 - 5.20 x10*6/uL    Hemoglobin 9.3 (L) 12.0 - 16.0 g/dL    Hematocrit 28.1 (L) 36.0 - 46.0 %    MCV 89 80 - 100 fL    MCH 29.5 26.0 - 34.0 pg    MCHC 33.1 32.0 - 36.0 g/dL    RDW 12.6 11.5 - 14.5 %    Platelets 427 150 - 450 x10*3/uL    Neutrophils % 66.8 40.0 - 80.0 %    Immature Granulocytes %, Automated 0.4 0.0 - 0.9 %    Lymphocytes % 20.9 13.0 - 44.0 %    Monocytes % 8.3 2.0 - 10.0 %    Eosinophils % 3.0 0.0 - 6.0 %    Basophils % 0.6 0.0 - 2.0 %    Neutrophils Absolute 5.54 1.20 - 7.70 x10*3/uL    Immature Granulocytes Absolute, Automated 0.03 0.00 - 0.70 x10*3/uL    Lymphocytes  Absolute 1.73 1.20 - 4.80 x10*3/uL    Monocytes Absolute 0.69 0.10 - 1.00 x10*3/uL    Eosinophils Absolute 0.25 0.00 - 0.70 x10*3/uL    Basophils Absolute 0.05 0.00 - 0.10 x10*3/uL   POCT GLUCOSE   Result Value Ref Range    POCT Glucose 133 (H) 74 - 99 mg/dL       Assessment/Plan   1.  Acute kidney injury  - Likely acute tubular necrosis multifactorial in the setting of hypotension, infection, IV contrast, Vancomycin and Zosyn  Renal function is a stable today looks like creatinine is plateaued at 5.3 my plan to continue to monitor I will not proceed with dialysis yet recheck renal function tomorrow morning  2.  Chronic kidney disease stage III  - baseline Cr 1.2-1.5  3.  Cellulitis of the foot  4.  Diabetes mellitus               Brandt Leon MD

## 2024-01-14 NOTE — PROGRESS NOTES
INFECTIOUS DISEASES PROGRESS NOTE    Consulted / following patient for:  Right foot infection    Subjective   Interval History:   (Sleeping soundly, not awakened       Objective   PHYSICAL EXAMINATION  Vital signs:  Visit Vitals  /86 (BP Location: Left arm, Patient Position: Lying)   Pulse 88   Temp 36.8 °C (98.2 °F) (Oral)   Resp 18      General: No acute distress, not toxic  Extremities: Dressing not removed, right lower extremity.    Antibiotics:  Cefepime day 5  Linezolid day 3    Relevant Results    Results from last 72 hours   Lab Units 01/14/24  0528   CREATININE mg/dL 5.30*   ANION GAP mmol/L 15   EGFR mL/min/1.73m*2 9*     Results from last 72 hours   Lab Units 01/11/24  0747   AST U/L 8   ALT U/L 6   ALK PHOS U/L 73   BILIRUBIN TOTAL mg/dL <0.2     Microbiology:  Blood (1/9): Negative X2  Urine (1/9): Low colony count group B streptococcus  Urine (1/11): No pathogens  Wound (1/10): Gram's stain shows no PMNs.  MSSA, Candida glabrata   Operative specimens (1/12): No growth            ASSESSMENT:  Right foot infection/diabetes mellitus  Remains on broad-spectrum antibiotics day 2 after I&D, with preliminary cultures growing MSSA and Candida glabrata.  Will stop linezolid and continue cefepime pending final cultures.  Will review with Dr. Pizano regarding operative findings    Acute kidney injury  Creatinine stabilized at 5.3 today     PLANS:  -    Continue cefepime   -    Stop linezolid  -    Await final cultures  -    Review with Dr. Pizano  -    VENKAT per Dr. Edward Hickey MD  [email or web address]  Office:  [phone]

## 2024-01-14 NOTE — CARE PLAN
The patient's goals for the shift include Safety    The clinical goals for the shift include Patient will remain free of injury this shift.      Problem: Pain  Goal: My pain/discomfort is manageable  Outcome: Progressing     Problem: Safety  Goal: Patient will be injury free during hospitalization  Outcome: Progressing     Problem: Daily Care  Goal: Daily care needs are met  Outcome: Progressing     Problem: Psychosocial Needs  Goal: Demonstrates ability to cope with hospitalization/illness  Outcome: Progressing  Goal: Collaborate with me, my family, and caregiver to identify my specific goals  Outcome: Progressing     Problem: Skin  Goal: Prevent/manage excess moisture  Outcome: Progressing  Goal: Prevent/minimize sheer/friction injuries  Outcome: Progressing     Problem: Diabetes  Goal: Achieve decreasing blood glucose levels by end of shift  Outcome: Progressing  Goal: Increase stability of blood glucose readings by end of shift  Outcome: Progressing  Goal: Decrease in ketones present in urine by end of shift  Outcome: Progressing  Goal: Maintain electrolyte levels within acceptable range throughout shift  Outcome: Progressing  Goal: Maintain glucose levels >70mg/dl to <250mg/dl throughout shift  Outcome: Progressing  Goal: Learn about and adhere to nutrition recommendations by end of shift  Outcome: Progressing  Goal: Receive DSME education by end of shift  Outcome: Progressing  Goal: No changes in neurological exam by end of shift  Outcome: Progressing  Goal: Vital signs within normal range for age by end of shift  Outcome: Progressing  Goal: Increase self care and/or family involovement by end of shift  Outcome: Progressing        done

## 2024-01-14 NOTE — PROGRESS NOTES
Nuris Squires is a 58 y.o. female on day 2 of admission presenting with Right foot infection.    Subjective   The patient was seen and examined.  Lying in the bed.  Comfortable.  Not in acute distress.  Patient denies any headache or dizziness.  No nausea or vomiting.  No diarrhea, dysuria, hematuria frequency.     Objective     Physical Exam  HEENT:  Head externally atraumatic, no pallor, no icterus, extraocular movements intact, pupils reacting to light, oral mucosa moist and throat clear.  Neck:  Supple, no JVP, no palpable adenopathy or thyromegaly.  No carotid bruit.  Chest:  Clear to auscultation and resonant.  Heart:  Regular rate and rhythm, no murmur or gallop could be appreciated.  Abdomen:  Soft, nontender, bowel sounds present, normoactive, no palpable hepatosplenomegaly.  Extremities:  No edema, pulses present, no cyanosis or clubbing.  CNS:  Patient alert, oriented to time, place and person.  Power 5/5 all over and deep tendon reflexes symmetrical, cranial nerves 2-12 grossly intact.  Skin:  No active rash.  Musculoskeletal:  No joint swelling or erythema, range of movement normal.  Last Recorded Vitals  Heart Rate:  [76-81]   Temp:  [36.3 °C (97.3 °F)-36.8 °C (98.2 °F)]   Resp:  [16-18]   BP: (106-152)/(71-78)   SpO2:  [97 %-100 %]     Intake/Output last 3 Shifts:  I/O last 3 completed shifts:  In: 1998 (19.6 mL/kg) [P.O.:240; I.V.:200 (2 mL/kg); IV Piggyback:1558]  Out: - (0 mL/kg)   Weight: 102.1 kg     Relevant Results  Susceptibility data from last 90 days.  Collected Specimen Info Organism Clindamycin Erythromycin Oxacillin Tetracycline Trimethoprim/Sulfamethoxazole Vancomycin   01/10/24 Tissue/Biopsy from Diabetic Foot Ulcer Methicillin Susceptible Staphylococcus aureus (MSSA) R R S S S S     Candida glabrata           Mixed Gram-Positive Bacteria          01/09/24 Urine from Clean Catch/Voided Streptococcus agalactiae (Group B Streptococcus)           Results for orders placed or performed  during the hospital encounter of 01/09/24 (from the past 24 hour(s))   Renal Function Panel   Result Value Ref Range    Glucose 277 (H) 65 - 99 mg/dL    Sodium 132 (L) 133 - 145 mmol/L    Potassium 3.7 3.4 - 5.1 mmol/L    Chloride 98 97 - 107 mmol/L    Bicarbonate 17 (L) 24 - 31 mmol/L    Urea Nitrogen 30 (H) 8 - 25 mg/dL    Creatinine 5.30 (H) 0.40 - 1.60 mg/dL    eGFR 9 (L) >60 mL/min/1.73m*2    Calcium 8.0 (L) 8.5 - 10.4 mg/dL    Phosphorus 5.2 (H) 2.5 - 4.5 mg/dL    Albumin 2.4 (L) 3.5 - 5.0 g/dL    Anion Gap 17 <=19 mmol/L   CBC   Result Value Ref Range    WBC 9.7 4.4 - 11.3 x10*3/uL    nRBC 0.0 0.0 - 0.0 /100 WBCs    RBC 3.21 (L) 4.00 - 5.20 x10*6/uL    Hemoglobin 9.5 (L) 12.0 - 16.0 g/dL    Hematocrit 29.4 (L) 36.0 - 46.0 %    MCV 92 80 - 100 fL    MCH 29.6 26.0 - 34.0 pg    MCHC 32.3 32.0 - 36.0 g/dL    RDW 12.3 11.5 - 14.5 %    Platelets 424 150 - 450 x10*3/uL   POCT GLUCOSE   Result Value Ref Range    POCT Glucose 210 (H) 74 - 99 mg/dL   POCT GLUCOSE   Result Value Ref Range    POCT Glucose 124 (H) 74 - 99 mg/dL        Current Facility-Administered Medications:     acetaminophen (Tylenol) tablet 650 mg, 650 mg, oral, q4h PRN **OR** acetaminophen (Tylenol) oral liquid 650 mg, 650 mg, oral, q4h PRN **OR** acetaminophen (Tylenol) suppository 650 mg, 650 mg, rectal, q4h PRN, Bk Whittington DPM    aspirin chewable tablet 81 mg, 81 mg, oral, Daily, Bk Whittington DPM, 81 mg at 01/13/24 0942    atorvastatin (Lipitor) tablet 40 mg, 40 mg, oral, Nightly, Bk Whittington DPM, 40 mg at 01/12/24 2138    benzocaine-menthol (Cepastat Sore Throat) 15-3.6 mg lozenge 1 lozenge, 1 lozenge, Mouth/Throat, q2h PRN, Bk Whittington DPM    cefepime (Maxipime) 1 g in dextrose 5 % 50 mL IV, 1 g, intravenous, q24h, Bk Whittington DPM, Stopped at 01/13/24 0445    cholecalciferol (Vitamin D-3) tablet 2,000 Units, 2,000 Units, oral, Daily, Bk Whittington DPM, 2,000 Units at 01/13/24 0603    dextromethorphan-guaifenesin  (Robitussin DM)  mg/5 mL oral liquid 5 mL, 5 mL, oral, q4h PRN, Bk Whittington DPM    dextrose 10 % in water (D10W) infusion, 0.3 g/kg/hr, intravenous, Once PRN, Bk Whittington DPM    dextrose 50 % injection 25 g, 25 g, intravenous, q15 min PRN, Bk Whittington DPM    dicyclomine (Bentyl) capsule 10 mg, 10 mg, oral, 4x daily, Bk Whittington DPM, 10 mg at 01/13/24 1747    estradiol (Estrace) 0.01 % (0.1 mg/gram) vaginal cream 2 g, 2 g, vaginal, Nightly, Bk Whittington DPM    gabapentin (Neurontin) capsule 300 mg, 300 mg, oral, BID, Bk Whittington DPM, 300 mg at 01/13/24 0942    glucagon (Glucagen) injection 1 mg, 1 mg, intramuscular, q15 min PRN, Bk Whittington DPM    guaiFENesin (Mucinex) 12 hr tablet 600 mg, 600 mg, oral, q12h PRN, Bk Whittington DPM    heparin (porcine) injection 5,000 Units, 5,000 Units, subcutaneous, q8h, TAL Santana-CNP, 5,000 Units at 01/13/24 1320    insulin glargine (Lantus) injection 30 Units, 30 Units, subcutaneous, Nightly, TAL Santana-CNP, 30 Units at 01/12/24 2140    insulin lispro (HumaLOG) injection 0-15 Units, 0-15 Units, subcutaneous, TID with meals, Bk Whittington DPM, 6 Units at 01/13/24 0942    linezolid (Zyvox) tablet 600 mg, 600 mg, oral, q12h, Bk Whittington DPM, 600 mg at 01/13/24 0942    [Held by provider] magnesium oxide (Mag-Ox) tablet 400 mg, 400 mg, oral, Daily, Bk Whittington DPM, 400 mg at 01/12/24 0719    meclizine (Antivert) tablet 25 mg, 25 mg, oral, TID PRN, Bk Whittington DPM    multivitamin with minerals 1 tablet, 1 tablet, oral, Daily, Bk Whittington DPM, 1 tablet at 01/13/24 0942    ondansetron ODT (Zofran-ODT) disintegrating tablet 4 mg, 4 mg, oral, q8h PRN, Bk Whittington DPM    pantoprazole (ProtoNix) EC tablet 40 mg, 40 mg, oral, Daily before breakfast, Bk Whittington DPM, 40 mg at 01/13/24 0603    polyethylene glycol (Glycolax, Miralax) packet 17 g, 17 g, oral, Daily PRN, Bk GEORGE  JUANJOSE Whittington    sennosides-docusate sodium (Doris-Colace) 8.6-50 mg per tablet 2 tablet, 2 tablet, oral, Nightly, Bk ARIEL JUANJOSE Whittington, 2 tablet at 01/12/24 2138    sodium bicarbonate 75 mEq in 1000 mL sodium chloride 0.45% solution, 80 mL/hr, intravenous, Continuous, Bk GEORGE JUANJOSE Whittington, Last Rate: 80 mL/hr at 01/13/24 1747, 80 mL/hr at 01/13/24 1747   Assessment/Plan   Principal Problem:    Right foot infection    Chronic kidney disease 3  Cellulitis right foot  Diabetes mellitus type 2  Obesity  Hyperlipidemia  Hypertension  Normocytic anemia  UTI  Diarrhea    Plan: Continue current medicine give supportive care with current antibiotic.  Monitor renal function panel.  Patient is scheduled for surgery.  Patient renal function is worse than before.  Patient's diabetes is also uncontrolled.  Her GFR has dropped down to 9.  Monitor renal function panel closely.  Give physical therapy and Occupational Therapy.  We will take DVT, fall, aspiration, decubitus and DVT precautions.           Dariel Travis MD

## 2024-01-14 NOTE — NURSING NOTE
Assumed care of patient after report from departing RN. Pt lying in bed talking to  at the bedside, respirations are even and unlabored, and no distress noted on exam. Patient expresses no needs at this time. Bed in locked and lowered position with call light within reach. All other safety measures are intact. Will continue with current plan of care and update as necessary.

## 2024-01-14 NOTE — CARE PLAN
Problem: Skin  Goal: Prevent/manage excess moisture  Outcome: Progressing  Goal: Prevent/minimize sheer/friction injuries  Outcome: Progressing     Problem: Diabetes  Goal: Achieve decreasing blood glucose levels by end of shift  Outcome: Progressing  Goal: Increase stability of blood glucose readings by end of shift  Outcome: Progressing  Goal: Decrease in ketones present in urine by end of shift  Outcome: Progressing  Goal: Maintain electrolyte levels within acceptable range throughout shift  Outcome: Progressing  Goal: Maintain glucose levels >70mg/dl to <250mg/dl throughout shift  Outcome: Progressing  Goal: Learn about and adhere to nutrition recommendations by end of shift  Outcome: Progressing  Goal: Receive DSME education by end of shift  Outcome: Progressing  Goal: No changes in neurological exam by end of shift  Outcome: Progressing  Goal: Vital signs within normal range for age by end of shift  Outcome: Progressing  Goal: Increase self care and/or family involovement by end of shift  Outcome: Progressing     Problem: Safety - Adult  Goal: Free from fall injury  Outcome: Progressing   The patient's goals for the shift include Safety    The clinical goals for the shift include Patient will remain free from falls, injury; antibiotic therapy

## 2024-01-14 NOTE — PROGRESS NOTES
Nuris Squires is a 58 y.o. female on day 3 of admission presenting with Right foot infection.    Subjective   Patient was seen and examined patient is comfortable lying the bed.  Denies any headache or dizziness.  No nausea or vomiting.  No diarrhea, dysuria, hematuria frequency.       Objective     Physical Exam  HEENT:  Head externally atraumatic, no pallor, no icterus, extraocular movements intact,, oral mucosa moist.  Neck:  Supple, no JVP, no palpable adenopathy or thyromegaly.  No carotid bruit.  Chest:  Clear to auscultation and resonant.  Heart:  Regular rate and rhythm, no murmur or gallop could be appreciated.  Abdomen:  Soft, nontender, bowel sounds present, normoactive, no palpable hepatosplenomegaly.  Extremities:  No edema, pulses present, no cyanosis or clubbing.  CNS:  Patient alert, oriented to time, place and person.  Power 5/5 all over and deep tendon reflexes symmetrical, cranial nerves 2-12 grossly intact.  Skin:  No active rash.  Musculoskeletal:  No joint swelling or erythema, range of movement normal.  Last Recorded Vitals  Heart Rate:  [77-88]   Temp:  [36.8 °C (98.2 °F)-36.9 °C (98.4 °F)]   Resp:  [16-18]   BP: (106-150)/(75-86)   SpO2:  [100 %]     Intake/Output last 3 Shifts:  I/O last 3 completed shifts:  In: 1798 (17.6 mL/kg) [P.O.:240; IV Piggyback:1558]  Out: - (0 mL/kg)   Weight: 102.1 kg     Relevant Results  Susceptibility data from last 90 days.  Collected Specimen Info Organism Clindamycin Erythromycin Oxacillin Tetracycline Trimethoprim/Sulfamethoxazole Vancomycin   01/10/24 Tissue/Biopsy from Diabetic Foot Ulcer Methicillin Susceptible Staphylococcus aureus (MSSA) R R S S S S     Candida glabrata           Mixed Gram-Positive Bacteria          01/09/24 Urine from Clean Catch/Voided Streptococcus agalactiae (Group B Streptococcus)           Results for orders placed or performed during the hospital encounter of 01/09/24 (from the past 24 hour(s))   POCT GLUCOSE   Result Value  Ref Range    POCT Glucose 159 (H) 74 - 99 mg/dL   Renal Function Panel   Result Value Ref Range    Glucose 181 (H) 65 - 99 mg/dL    Sodium 137 133 - 145 mmol/L    Potassium 3.5 3.4 - 5.1 mmol/L    Chloride 102 97 - 107 mmol/L    Bicarbonate 20 (L) 24 - 31 mmol/L    Urea Nitrogen 33 (H) 8 - 25 mg/dL    Creatinine 5.30 (H) 0.40 - 1.60 mg/dL    eGFR 9 (L) >60 mL/min/1.73m*2    Calcium 8.5 8.5 - 10.4 mg/dL    Phosphorus 5.1 (H) 2.5 - 4.5 mg/dL    Albumin 2.6 (L) 3.5 - 5.0 g/dL    Anion Gap 15 <=19 mmol/L   CBC and Auto Differential   Result Value Ref Range    WBC 8.3 4.4 - 11.3 x10*3/uL    nRBC 0.0 0.0 - 0.0 /100 WBCs    RBC 3.15 (L) 4.00 - 5.20 x10*6/uL    Hemoglobin 9.3 (L) 12.0 - 16.0 g/dL    Hematocrit 28.1 (L) 36.0 - 46.0 %    MCV 89 80 - 100 fL    MCH 29.5 26.0 - 34.0 pg    MCHC 33.1 32.0 - 36.0 g/dL    RDW 12.6 11.5 - 14.5 %    Platelets 427 150 - 450 x10*3/uL    Neutrophils % 66.8 40.0 - 80.0 %    Immature Granulocytes %, Automated 0.4 0.0 - 0.9 %    Lymphocytes % 20.9 13.0 - 44.0 %    Monocytes % 8.3 2.0 - 10.0 %    Eosinophils % 3.0 0.0 - 6.0 %    Basophils % 0.6 0.0 - 2.0 %    Neutrophils Absolute 5.54 1.20 - 7.70 x10*3/uL    Immature Granulocytes Absolute, Automated 0.03 0.00 - 0.70 x10*3/uL    Lymphocytes Absolute 1.73 1.20 - 4.80 x10*3/uL    Monocytes Absolute 0.69 0.10 - 1.00 x10*3/uL    Eosinophils Absolute 0.25 0.00 - 0.70 x10*3/uL    Basophils Absolute 0.05 0.00 - 0.10 x10*3/uL   POCT GLUCOSE   Result Value Ref Range    POCT Glucose 133 (H) 74 - 99 mg/dL   POCT GLUCOSE   Result Value Ref Range    POCT Glucose 188 (H) 74 - 99 mg/dL        Current Facility-Administered Medications:     acetaminophen (Tylenol) tablet 650 mg, 650 mg, oral, q4h PRN **OR** acetaminophen (Tylenol) oral liquid 650 mg, 650 mg, oral, q4h PRN **OR** acetaminophen (Tylenol) suppository 650 mg, 650 mg, rectal, q4h PRN, kB Whittington DPM    aspirin chewable tablet 81 mg, 81 mg, oral, Daily, Bk Whittington DPM, 81 mg at  01/14/24 0941    atorvastatin (Lipitor) tablet 40 mg, 40 mg, oral, Nightly, Bk Whittington DPM, 40 mg at 01/13/24 2023    benzocaine-menthol (Cepastat Sore Throat) 15-3.6 mg lozenge 1 lozenge, 1 lozenge, Mouth/Throat, q2h PRN, Bk Whittington DPM    cefepime (Maxipime) 1 g in dextrose 5 % 50 mL IV, 1 g, intravenous, q24h, Bk Whittington DPM, Stopped at 01/14/24 0618    cholecalciferol (Vitamin D-3) tablet 2,000 Units, 2,000 Units, oral, Daily, Bk Whittington DPM, 2,000 Units at 01/14/24 0647    dextromethorphan-guaifenesin (Robitussin DM)  mg/5 mL oral liquid 5 mL, 5 mL, oral, q4h PRN, Bk Whittington DPM    dextrose 10 % in water (D10W) infusion, 0.3 g/kg/hr, intravenous, Once PRN, Bk Whittington DPM    dextrose 50 % injection 25 g, 25 g, intravenous, q15 min PRN, Bk Whittington DPM    dicyclomine (Bentyl) capsule 10 mg, 10 mg, oral, 4x daily, Bk Whittington DPM, 10 mg at 01/14/24 1233    estradiol (Estrace) 0.01 % (0.1 mg/gram) vaginal cream 2 g, 2 g, vaginal, Nightly, Bk Whittington DPM    gabapentin (Neurontin) capsule 300 mg, 300 mg, oral, BID, Bk Whittington DPM, 300 mg at 01/14/24 0941    glucagon (Glucagen) injection 1 mg, 1 mg, intramuscular, q15 min PRN, Bk Whittington DPM    guaiFENesin (Mucinex) 12 hr tablet 600 mg, 600 mg, oral, q12h PRN, Bk Whittington DPM    heparin (porcine) injection 5,000 Units, 5,000 Units, subcutaneous, q8h, Celeste Hill, TAL-CNP, 5,000 Units at 01/14/24 0647    insulin glargine (Lantus) injection 30 Units, 30 Units, subcutaneous, Nightly, Celeste Hill, APRN-CNP, 30 Units at 01/13/24 2024    insulin lispro (HumaLOG) injection 0-15 Units, 0-15 Units, subcutaneous, TID with meals, Bk Whittington DPM, 3 Units at 01/14/24 1238    [Held by provider] magnesium oxide (Mag-Ox) tablet 400 mg, 400 mg, oral, Daily, Bk Whittington DPM, 400 mg at 01/12/24 0719    meclizine (Antivert) tablet 25 mg, 25 mg, oral, TID PRN, Bk Whittington,  DPM    multivitamin with minerals 1 tablet, 1 tablet, oral, Daily, Bk GEORGE JUANJOSE Whittington, 1 tablet at 01/14/24 0941    ondansetron ODT (Zofran-ODT) disintegrating tablet 4 mg, 4 mg, oral, q8h PRN, Bk Whittington DPM    pantoprazole (ProtoNix) EC tablet 40 mg, 40 mg, oral, Daily before breakfast, Bk GEORGE JUANJOSE Whittington, 40 mg at 01/14/24 0647    polyethylene glycol (Glycolax, Miralax) packet 17 g, 17 g, oral, Daily PRN, Bk ARIEL JUANJOSE Whittington    sennosides-docusate sodium (Doris-Colace) 8.6-50 mg per tablet 2 tablet, 2 tablet, oral, Nightly, Bk GEORGE JUANJOSE Whittington, 2 tablet at 01/12/24 2138    sodium bicarbonate 75 mEq in 1000 mL sodium chloride 0.45% solution, 80 mL/hr, intravenous, Continuous, Bk GEORGE JUANJOSE Whittington, Last Rate: 80 mL/hr at 01/13/24 1747, 80 mL/hr at 01/13/24 1747   Assessment/Plan   Principal Problem:    Right foot infection  Diabetes mellitus type 2  Patient  Hyperlipidemia  Hypertension  Normocytic anemia  UTI  Diarrhea    Plan: Continue current medication.  Supportive care.  Status post right hallux amputation and bone biopsy.  Bone culture report is pending.  Continue IV antibiotics at this time.  ID on consult.  Patient has acute on chronic renal failure.  Renal function stable as compared to yesterday.  Hyponatremia has resolved.  Blood sugars still running high.  Insulin adjusted.  Will treat DVT, fall, aspiration, decubitus and DVT precautions.  Discharge soon once cleared by the consultants.       Dariel Travis MD

## 2024-01-15 DIAGNOSIS — Z79.4 TYPE 2 DIABETES MELLITUS WITH RIGHT EYE AFFECTED BY PROLIFERATIVE RETINOPATHY AND MACULAR EDEMA, WITH LONG-TERM CURRENT USE OF INSULIN (MULTI): Primary | ICD-10-CM

## 2024-01-15 DIAGNOSIS — E11.3511 TYPE 2 DIABETES MELLITUS WITH RIGHT EYE AFFECTED BY PROLIFERATIVE RETINOPATHY AND MACULAR EDEMA, WITH LONG-TERM CURRENT USE OF INSULIN (MULTI): Primary | ICD-10-CM

## 2024-01-15 LAB
ALBUMIN SERPL-MCNC: 2.8 G/DL (ref 3.5–5)
ANION GAP SERPL CALC-SCNC: 13 MMOL/L
BACTERIA SPEC CULT: ABNORMAL
BACTERIA SPEC CULT: ABNORMAL
BASOPHILS # BLD AUTO: 0.05 X10*3/UL (ref 0–0.1)
BASOPHILS NFR BLD AUTO: 0.6 %
BUN SERPL-MCNC: 29 MG/DL (ref 8–25)
CALCIUM SERPL-MCNC: 8.6 MG/DL (ref 8.5–10.4)
CHLORIDE SERPL-SCNC: 104 MMOL/L (ref 97–107)
CO2 SERPL-SCNC: 24 MMOL/L (ref 24–31)
CREAT SERPL-MCNC: 4.5 MG/DL (ref 0.4–1.6)
EGFRCR SERPLBLD CKD-EPI 2021: 11 ML/MIN/1.73M*2
EOSINOPHIL # BLD AUTO: 0.28 X10*3/UL (ref 0–0.7)
EOSINOPHIL NFR BLD AUTO: 3.4 %
ERYTHROCYTE [DISTWIDTH] IN BLOOD BY AUTOMATED COUNT: 13 % (ref 11.5–14.5)
FERRITIN SERPL-MCNC: 487 NG/ML (ref 13–150)
GLUCOSE BLD MANUAL STRIP-MCNC: 188 MG/DL (ref 74–99)
GLUCOSE BLD MANUAL STRIP-MCNC: 263 MG/DL (ref 74–99)
GLUCOSE BLD MANUAL STRIP-MCNC: 312 MG/DL (ref 74–99)
GLUCOSE BLD MANUAL STRIP-MCNC: 69 MG/DL (ref 74–99)
GLUCOSE BLD MANUAL STRIP-MCNC: 76 MG/DL (ref 74–99)
GLUCOSE SERPL-MCNC: 99 MG/DL (ref 65–99)
GRAM STN SPEC: ABNORMAL
GRAM STN SPEC: ABNORMAL
HCT VFR BLD AUTO: 28.4 % (ref 36–46)
HGB BLD-MCNC: 9 G/DL (ref 12–16)
IMM GRANULOCYTES # BLD AUTO: 0.04 X10*3/UL (ref 0–0.7)
IMM GRANULOCYTES NFR BLD AUTO: 0.5 % (ref 0–0.9)
IRON SATN MFR SERPL: 32 % (ref 12–50)
IRON SERPL-MCNC: 48 UG/DL (ref 30–160)
LYMPHOCYTES # BLD AUTO: 2.54 X10*3/UL (ref 1.2–4.8)
LYMPHOCYTES NFR BLD AUTO: 31.2 %
MCH RBC QN AUTO: 29.3 PG (ref 26–34)
MCHC RBC AUTO-ENTMCNC: 31.7 G/DL (ref 32–36)
MCV RBC AUTO: 93 FL (ref 80–100)
MONOCYTES # BLD AUTO: 0.81 X10*3/UL (ref 0.1–1)
MONOCYTES NFR BLD AUTO: 10 %
NEUTROPHILS # BLD AUTO: 4.41 X10*3/UL (ref 1.2–7.7)
NEUTROPHILS NFR BLD AUTO: 54.3 %
NRBC BLD-RTO: 0 /100 WBCS (ref 0–0)
PHOSPHATE SERPL-MCNC: 4.6 MG/DL (ref 2.5–4.5)
PLATELET # BLD AUTO: 445 X10*3/UL (ref 150–450)
POTASSIUM SERPL-SCNC: 3.4 MMOL/L (ref 3.4–5.1)
RBC # BLD AUTO: 3.07 X10*6/UL (ref 4–5.2)
SODIUM SERPL-SCNC: 141 MMOL/L (ref 133–145)
TIBC SERPL-MCNC: 151 UG/DL (ref 228–428)
UIBC SERPL-MCNC: 103 UG/DL (ref 110–370)
WBC # BLD AUTO: 8.1 X10*3/UL (ref 4.4–11.3)

## 2024-01-15 PROCEDURE — 2500000001 HC RX 250 WO HCPCS SELF ADMINISTERED DRUGS (ALT 637 FOR MEDICARE OP): Performed by: STUDENT IN AN ORGANIZED HEALTH CARE EDUCATION/TRAINING PROGRAM

## 2024-01-15 PROCEDURE — 83540 ASSAY OF IRON: CPT | Performed by: NURSE PRACTITIONER

## 2024-01-15 PROCEDURE — 82728 ASSAY OF FERRITIN: CPT | Performed by: NURSE PRACTITIONER

## 2024-01-15 PROCEDURE — 97166 OT EVAL MOD COMPLEX 45 MIN: CPT | Mod: GO

## 2024-01-15 PROCEDURE — 97116 GAIT TRAINING THERAPY: CPT | Mod: GP

## 2024-01-15 PROCEDURE — 84100 ASSAY OF PHOSPHORUS: CPT | Performed by: STUDENT IN AN ORGANIZED HEALTH CARE EDUCATION/TRAINING PROGRAM

## 2024-01-15 PROCEDURE — 82947 ASSAY GLUCOSE BLOOD QUANT: CPT

## 2024-01-15 PROCEDURE — 1200000002 HC GENERAL ROOM WITH TELEMETRY DAILY

## 2024-01-15 PROCEDURE — 2500000004 HC RX 250 GENERAL PHARMACY W/ HCPCS (ALT 636 FOR OP/ED): Performed by: INTERNAL MEDICINE

## 2024-01-15 PROCEDURE — 2500000001 HC RX 250 WO HCPCS SELF ADMINISTERED DRUGS (ALT 637 FOR MEDICARE OP): Performed by: INTERNAL MEDICINE

## 2024-01-15 PROCEDURE — 93010 ELECTROCARDIOGRAM REPORT: CPT | Performed by: INTERNAL MEDICINE

## 2024-01-15 PROCEDURE — 94760 N-INVAS EAR/PLS OXIMETRY 1: CPT

## 2024-01-15 PROCEDURE — 85025 COMPLETE CBC W/AUTO DIFF WBC: CPT | Performed by: INTERNAL MEDICINE

## 2024-01-15 PROCEDURE — 2500000004 HC RX 250 GENERAL PHARMACY W/ HCPCS (ALT 636 FOR OP/ED): Performed by: STUDENT IN AN ORGANIZED HEALTH CARE EDUCATION/TRAINING PROGRAM

## 2024-01-15 PROCEDURE — 97530 THERAPEUTIC ACTIVITIES: CPT | Mod: GO

## 2024-01-15 PROCEDURE — 97164 PT RE-EVAL EST PLAN CARE: CPT | Mod: GP

## 2024-01-15 PROCEDURE — 2500000004 HC RX 250 GENERAL PHARMACY W/ HCPCS (ALT 636 FOR OP/ED): Performed by: NURSE PRACTITIONER

## 2024-01-15 PROCEDURE — 36415 COLL VENOUS BLD VENIPUNCTURE: CPT | Performed by: INTERNAL MEDICINE

## 2024-01-15 RX ORDER — CEFTRIAXONE 2 G/50ML
2 INJECTION, SOLUTION INTRAVENOUS EVERY 24 HOURS
Qty: 1500 ML | Refills: 1 | Status: SHIPPED | OUTPATIENT
Start: 2024-01-15 | End: 2024-02-21

## 2024-01-15 RX ORDER — LIDOCAINE HYDROCHLORIDE 10 MG/ML
5 INJECTION INFILTRATION; PERINEURAL ONCE
Status: DISCONTINUED | OUTPATIENT
Start: 2024-01-15 | End: 2024-01-18 | Stop reason: HOSPADM

## 2024-01-15 RX ORDER — AMLODIPINE BESYLATE 10 MG/1
10 TABLET ORAL DAILY
Status: DISCONTINUED | OUTPATIENT
Start: 2024-01-15 | End: 2024-01-18 | Stop reason: HOSPADM

## 2024-01-15 RX ORDER — CEFTRIAXONE 2 G/50ML
2 INJECTION, SOLUTION INTRAVENOUS EVERY 24 HOURS
Status: DISCONTINUED | OUTPATIENT
Start: 2024-01-15 | End: 2024-01-18 | Stop reason: HOSPADM

## 2024-01-15 RX ADMIN — HEPARIN SODIUM 5000 UNITS: 5000 INJECTION, SOLUTION INTRAVENOUS; SUBCUTANEOUS at 15:16

## 2024-01-15 RX ADMIN — Medication 1 TABLET: at 09:49

## 2024-01-15 RX ADMIN — DICYCLOMINE HYDROCHLORIDE 10 MG: 10 CAPSULE ORAL at 22:00

## 2024-01-15 RX ADMIN — GABAPENTIN 300 MG: 300 CAPSULE ORAL at 21:59

## 2024-01-15 RX ADMIN — AMLODIPINE BESYLATE 10 MG: 10 TABLET ORAL at 10:18

## 2024-01-15 RX ADMIN — PANTOPRAZOLE SODIUM 40 MG: 40 TABLET, DELAYED RELEASE ORAL at 06:37

## 2024-01-15 RX ADMIN — DICYCLOMINE HYDROCHLORIDE 10 MG: 10 CAPSULE ORAL at 15:13

## 2024-01-15 RX ADMIN — ASPIRIN 81 MG: 81 TABLET, CHEWABLE ORAL at 09:49

## 2024-01-15 RX ADMIN — HEPARIN SODIUM 5000 UNITS: 5000 INJECTION, SOLUTION INTRAVENOUS; SUBCUTANEOUS at 06:37

## 2024-01-15 RX ADMIN — ATORVASTATIN CALCIUM 40 MG: 40 TABLET, FILM COATED ORAL at 21:59

## 2024-01-15 RX ADMIN — CEFTRIAXONE SODIUM 2 G: 2 INJECTION, SOLUTION INTRAVENOUS at 18:36

## 2024-01-15 RX ADMIN — INSULIN LISPRO 12 UNITS: 100 INJECTION, SOLUTION INTRAVENOUS; SUBCUTANEOUS at 18:26

## 2024-01-15 RX ADMIN — HEPARIN SODIUM 5000 UNITS: 5000 INJECTION, SOLUTION INTRAVENOUS; SUBCUTANEOUS at 22:00

## 2024-01-15 RX ADMIN — Medication 2000 UNITS: at 06:37

## 2024-01-15 RX ADMIN — INSULIN LISPRO 9 UNITS: 100 INJECTION, SOLUTION INTRAVENOUS; SUBCUTANEOUS at 15:09

## 2024-01-15 RX ADMIN — DICYCLOMINE HYDROCHLORIDE 10 MG: 10 CAPSULE ORAL at 06:37

## 2024-01-15 RX ADMIN — GABAPENTIN 300 MG: 300 CAPSULE ORAL at 09:49

## 2024-01-15 RX ADMIN — MECLIZINE HYDROCHLORIDE 25 MG: 25 TABLET ORAL at 15:11

## 2024-01-15 RX ADMIN — INSULIN LISPRO 5 UNITS: 100 INJECTION, SOLUTION INTRAVENOUS; SUBCUTANEOUS at 18:27

## 2024-01-15 ASSESSMENT — COGNITIVE AND FUNCTIONAL STATUS - GENERAL
WALKING IN HOSPITAL ROOM: A LITTLE
DRESSING REGULAR LOWER BODY CLOTHING: A LOT
CLIMB 3 TO 5 STEPS WITH RAILING: A LITTLE
PERSONAL GROOMING: A LITTLE
WALKING IN HOSPITAL ROOM: A LITTLE
CLIMB 3 TO 5 STEPS WITH RAILING: A LOT
TOILETING: A LOT
DAILY ACTIVITIY SCORE: 16
DRESSING REGULAR LOWER BODY CLOTHING: A LOT
HELP NEEDED FOR BATHING: A LOT
PERSONAL GROOMING: A LITTLE
STANDING UP FROM CHAIR USING ARMS: A LITTLE
DRESSING REGULAR UPPER BODY CLOTHING: A LITTLE
DAILY ACTIVITIY SCORE: 16
MOVING TO AND FROM BED TO CHAIR: A LITTLE
DRESSING REGULAR UPPER BODY CLOTHING: A LITTLE
MOBILITY SCORE: 19
HELP NEEDED FOR BATHING: A LOT
MOBILITY SCORE: 21
MOVING TO AND FROM BED TO CHAIR: A LITTLE
TOILETING: A LOT

## 2024-01-15 ASSESSMENT — PAIN SCALES - GENERAL
PAINLEVEL_OUTOF10: 7
PAINLEVEL_OUTOF10: 0 - NO PAIN
PAINLEVEL_OUTOF10: 0 - NO PAIN

## 2024-01-15 ASSESSMENT — PAIN - FUNCTIONAL ASSESSMENT
PAIN_FUNCTIONAL_ASSESSMENT: 0-10

## 2024-01-15 ASSESSMENT — ACTIVITIES OF DAILY LIVING (ADL)
ADL_ASSISTANCE: INDEPENDENT
BATHING_ASSISTANCE: MINIMAL

## 2024-01-15 NOTE — NURSING NOTE
"Inpatient Diabetes Education Consult    Reason for Visit:  Nuris Squires is a 58 y.o. female who presents to ED with c/o \"right toe redness & edema\".   Found to have right foot infection.  On Friday 1/12/24 patient had partial right toe amputation.     Consulting Service/Provider:  MARY Wang    Visit Type: Initial visit    Visit Modality: In-person    Discharge Equipment/Supply Needs: Denies needs       Patient has supplies at home: Patient wears a Dexcom G7 CGM through her PCP Dr. Sarah Aquino MD.  Does not see an Endocrinologist.    Patient History and Assessment:  New diagnosis: Type 2  Previous diagnosis: Type 2  Patient known to Diabetes Education department: No  Treatment prior to hospital admission: Oral medications Metformin 1000mg 1 tab bid, Humalog 10 units TID, Basaglar 75 units qHS  Insulin: Rapid acting, Long acting  Complications: peripheral neuropathy and peripheral vascular disease  PTA Medications:    Current Outpatient Medications   Medication Instructions    amLODIPine (NORVASC) 10 mg, oral, Daily    aspirin 81 mg, oral, Daily RT    atorvastatin (LIPITOR) 40 mg, oral, Daily    cholecalciferol (VITAMIN D-3) 2,000 Units, oral, Daily RT    cloNIDine (CATAPRES) 0.1 mg, oral, 2 times daily    empagliflozin (JARDIANCE) 25 mg, oral, Daily    gabapentin (NEURONTIN) 300 mg, oral, 2 times daily    insulin glargine (BASAGLAR KWIKPEN U-100 INSULIN) 75 Units, subcutaneous, Nightly    insulin glargine (LANTUS U-100 INSULIN) 70 Units, subcutaneous, Nightly, Take as directed per insulin instructions.    insulin lispro (HUMALOG) 10 Units, subcutaneous, 3 times daily with meals    lisinopril 40 mg, oral, Daily    magnesium oxide (MAG-OX) 400 mg, oral, 2 times daily    meclizine (ANTIVERT) 25 mg, oral, 3 times daily PRN    metFORMIN (GLUCOPHAGE) 1,000 mg, oral, 2 times daily with meals    multivitamin with minerals (multivit-min-iron fum-folic ac) tablet 1 tablet, oral, Daily    OneTouch Delica Plus Lancet 30 gauge " "misc USE TO TEST BLOOD SUGAR AS DIRECTED    OneTouch Ultra2 Meter misc use 1 to 2 times daily    OneTouch Verio test strips strip USE TO CHECK BLOOD SUGAR AS DIRECTED       Glucose   Date/Time Value Ref Range Status   01/15/2024 06:06 AM 99 65 - 99 mg/dL Final   01/14/2024 05:28  (H) 65 - 99 mg/dL Final   01/13/2024 06:16  (H) 65 - 99 mg/dL Final   01/12/2024 06:03  (H) 65 - 99 mg/dL Final   01/11/2024 07:47  (H) 65 - 99 mg/dL Final   01/10/2024 05:46  (H) 65 - 99 mg/dL Final   01/09/2024 12:10  (H) 65 - 99 mg/dL Final   08/13/2023 05:12  (H) 74 - 99 mg/dL Final   06/26/2023 09:01  (H) 74 - 99 mg/dL Final   05/08/2023 09:35  (H) 74 - 99 mg/dL Final   01/27/2023 08:40  (H) 74 - 99 mg/dL Final   01/25/2023 04:16 PM 80 74 - 99 mg/dL Final   11/09/2022 09:26  (HH) 74 - 99 mg/dL Final     Comment:      10:46 11/09/2022.  GLUCOSE   Called- RB to MYRA ROMERO, 11/09/2022 10:46     04/12/2021 09:06  (H) 74 - 99 mg/dL Final   10/12/2020 11:43  (H) 74 - 99 mg/dL Final   10/11/2020 05:37  (H) 74 - 99 mg/dL Final   10/10/2020 06:03  (H) 74 - 99 mg/dL Final     No results found for: \"CPEPTIDE\"  POC HEMOGLOBIN A1c   Date Value Ref Range Status   09/14/2023 12.1 (A) 4.2 - 6.5 % Final   05/08/2023 14.0 (A) 4.2 - 6.5 % Final     Hemoglobin A1C   Date Value Ref Range Status   10/20/2020 13.3 % Final     Comment:          Diagnosis of Diabetes-Adults   Non-Diabetic: < or = 5.6%   Increased risk for developing diabetes: 5.7-6.4%   Diagnostic of diabetes: > or = 6.5%  .       Monitoring of Diabetes                Age (y)     Therapeutic Goal (%)   Adults:          >18           <7.0   Pediatrics:    13-18           <7.5                   7-12           <8.0                   0- 6            7.5-8.5   American Diabetes Association. Diabetes Care 33(S1), Jan 2010.     07/04/2019 10.1 % Final     Comment:          Diagnosis of Diabetes-Adults   " Non-Diabetic: < or = 5.6%   Increased risk for developing diabetes: 5.7-6.4%   Diagnostic of diabetes: > or = 6.5%  .       Monitoring of Diabetes                Age (y)     Therapeutic Goal (%)   Adults:          >18           <7.0   Pediatrics:    13-18           <7.5                   7-12           <8.0                   0- 6            7.5-8.5   American Diabetes Association. Diabetes Care 33(S1), Jan 2010.         Patient Learning/Readiness Assessment:  Discussed HbA1c of 12.1% 9/14/23. Discussed long term complications of hyperglycemia on the body.  Discussed the importance of wound healing to get BS under control.  Patient verbalizes understanding.    Diabetes education book given & reviewed.     Patient admits to missing doses of insulin.  Discussed the importance of taking medications as prescribed.  Verbalizes understanding.        Education Outcome/Recommendations:    Agreeable to outpatient diabetes education. Will refer.

## 2024-01-15 NOTE — CARE PLAN
Problem: Pain  Goal: My pain/discomfort is manageable  1/15/2024 0829 by Kapil Malin RN  Outcome: Progressing  1/15/2024 0826 by Kapil Malin RN  Outcome: Progressing     Problem: Safety  Goal: Patient will be injury free during hospitalization  1/15/2024 0829 by Kapil Malin RN  Outcome: Progressing  1/15/2024 0826 by Kapil Malin RN  Outcome: Progressing     Problem: Daily Care  Goal: Daily care needs are met  1/15/2024 0829 by Kapil Malin RN  Outcome: Progressing  1/15/2024 0826 by Kapil Malin RN  Outcome: Progressing     Problem: Psychosocial Needs  Goal: Demonstrates ability to cope with hospitalization/illness  1/15/2024 0829 by Kapil Malin RN  Outcome: Progressing  1/15/2024 0826 by Kapil Malin RN  Outcome: Progressing  Goal: Collaborate with me, my family, and caregiver to identify my specific goals  1/15/2024 0829 by Kapil Malin RN  Outcome: Progressing  1/15/2024 0826 by Kapil Malin RN  Outcome: Progressing     Problem: Discharge Barriers  Goal: My discharge needs are met  1/15/2024 0829 by Kapil Malin RN  Outcome: Progressing  1/15/2024 0826 by Kapil Malin RN  Outcome: Progressing     Problem: Skin  Goal: Prevent/manage excess moisture  1/15/2024 0829 by Kapil Malin RN  Outcome: Progressing  1/15/2024 0826 by Kapil Malin RN  Outcome: Progressing  Goal: Prevent/minimize sheer/friction injuries  1/15/2024 0829 by Kapil Malin RN  Outcome: Progressing  1/15/2024 0826 by Kapil Malin RN  Outcome: Progressing     Problem: Diabetes  Goal: Achieve decreasing blood glucose levels by end of shift  1/15/2024 0829 by Kapil Malin RN  Outcome: Progressing  1/15/2024 0826 by Kapil Malin RN  Outcome: Progressing  Goal: Increase stability of blood glucose readings by end of shift  1/15/2024 0829 by Kapil Malin RN  Outcome: Progressing  1/15/2024 0826 by Kapil Malin RN  Outcome:  Progressing  Goal: Decrease in ketones present in urine by end of shift  1/15/2024 0829 by Kapil Malin RN  Outcome: Progressing  1/15/2024 0826 by Kapil Malin RN  Outcome: Progressing  Goal: Maintain electrolyte levels within acceptable range throughout shift  1/15/2024 0829 by Kapil Malin RN  Outcome: Progressing  1/15/2024 0826 by Kapil Malin RN  Outcome: Progressing  Goal: Maintain glucose levels >70mg/dl to <250mg/dl throughout shift  1/15/2024 0829 by Kapil Malin RN  Outcome: Progressing  1/15/2024 0826 by Kapil Malin RN  Outcome: Progressing  Goal: Learn about and adhere to nutrition recommendations by end of shift  1/15/2024 0829 by Kapil Malin RN  Outcome: Progressing  1/15/2024 0826 by Kapil Malin RN  Outcome: Progressing  Goal: Receive DSME education by end of shift  1/15/2024 0829 by Kapil Malin RN  Outcome: Progressing  1/15/2024 0826 by Kapil Malin RN  Outcome: Progressing  Goal: No changes in neurological exam by end of shift  1/15/2024 0829 by Kapil Malin RN  Outcome: Progressing  1/15/2024 0826 by Kapil Malin RN  Outcome: Progressing  Goal: Vital signs within normal range for age by end of shift  1/15/2024 0829 by Kapil Malin RN  Outcome: Progressing  1/15/2024 0826 by Kapil Malin RN  Outcome: Progressing  Goal: Increase self care and/or family involovement by end of shift  1/15/2024 0829 by Kapil Malin RN  Outcome: Progressing  1/15/2024 0826 by Kapil Malin RN  Outcome: Progressing     Problem: Pain - Adult  Goal: Verbalizes/displays adequate comfort level or baseline comfort level  1/15/2024 0829 by Kapil Malin RN  Outcome: Progressing  1/15/2024 0826 by Kapil Malin RN  Outcome: Progressing     Problem: Safety - Adult  Goal: Free from fall injury  1/15/2024 0829 by Kapil Malin RN  Outcome: Progressing  1/15/2024 0826 by Kapil Malin RN  Outcome: Progressing    The patient's goals for the shift include Safety    The clinical goals for the shift include Patient will remain injury free this shift.

## 2024-01-15 NOTE — NURSING NOTE
Patient had a complaint of pain traversing across her chest and radiating down her right arm. Called rapid response nurse, started her on O2, and did an EKG, and got vitals. Rapid response nurse reviewed EKG with her supv and determined nothing was abnormal (shows NSR). Patient stated pain was a 7/10. Educated patient that we can give pain med (nitroglycerin) to which she declined. Will inform Dr. Travis.    5718: Informed Dr. Travis of above.

## 2024-01-15 NOTE — NURSING NOTE
Patient states that during preparation for her surgical procedure here the anesthesiologist hit a nerve in her arm when starting an IV. Patient states that she is having residual pain as a result.

## 2024-01-15 NOTE — PROGRESS NOTES
Anticipate discharge soon. New therapy orders placed. As of last week, patient was declining C. Will follow.     **PATIENT DOES NOT HAVE A SAFE DISCHARGE PLAN      Cesia Garcia RN

## 2024-01-15 NOTE — NURSING NOTE
BSSR completed; assumed care of patient at this time. Resting comfortably in bed. No c/o pain or discomfort. No distress noted. Bed locked and low, call light and other commonly used items within reach. Safety maintained.

## 2024-01-15 NOTE — NURSING NOTE
Patient to have luis fernando catheter placed for discharge antibiotics per Dr. Hickey discussion with Dr. Leon.

## 2024-01-15 NOTE — PROGRESS NOTES
Physical Therapy    Physical Therapy RE-Evaluation & Treatment    Patient Name: Nuris Squires  MRN: [id number]  Today's Date: 1/15/2024   Time Calculation  Start Time: 1307  Stop Time: 1331  Time Calculation (min): 24 min    Assessment/Plan   PT Assessment  PT Assessment Results: Decreased strength, Decreased range of motion, Impaired balance, Decreased endurance, Decreased mobility, Decreased coordination, Impaired judgement, Decreased safety awareness, Impaired vision, Impaired sensation, Obesity, Decreased skin integrity, Pain, Orthopedic restrictions  Rehab Prognosis: Good  Evaluation/Treatment Tolerance: Patient tolerated treatment well  Medical Staff Made Aware: Yes  Strengths: Ability to acquire knowledge  Barriers to Participation: Comorbidities  End of Session Communication: Bedside nurse  Assessment Comment: The patient is a 58 y.o. female that is now s/p Rt hallux amputation and is heel WB'ing with post-op shoe. The patient currently requires supervision to Casey for transfers and ambulation with SW. The patient would continue to benefit from skilled therapy services to address residual functional deficits.  End of Session Patient Position: Up in chair   IP OR SWING BED PT PLAN  Inpatient or Swing Bed: Inpatient  PT Plan  Treatment/Interventions: Bed mobility, Transfer training, Gait training, Stair training, Balance training, Neuromuscular re-education, Strengthening, Endurance training, Range of motion, Therapeutic exercise, Therapeutic activity, Home exercise program, Orthotic fitting/training  PT Plan: Skilled PT  PT Frequency: 4 times per week  PT Discharge Recommendations: Low intensity level of continued care  Equipment Recommended upon Discharge: Standard walker  PT Recommended Transfer Status: Assist x1  PT - OK to Discharge: Yes      Subjective     General Visit Information:  General  Reason for Referral: PT RE-EVAL  Referred By: Dr. Thaddeus MD  Past Medical History Relevant to Rehab: s/p R great  toe amputation, sickle cell disease, HTN, DM, HLD, morbid obesity, PAD, vertigo  Co-Treatment: OT  Co-Treatment Reason: optimize outcomes  Prior to Session Communication: Bedside nurse  Patient Position Received: Bed, 3 rail up  Preferred Learning Style: verbal  General Comment: The patient is a 58 y.o. female that is now s/p Rt hallux amputation; pt is now heel WB'ing RLE with post-op shoe.  Home Living:  Home Living  Type of Home: House  Lives With: Significant other  Home Adaptive Equipment: None  Home Layout: One level  Home Access: Stairs to enter with rails  Entrance Stairs-Rails: Both  Entrance Stairs-Number of Steps: 5  Bathroom Shower/Tub: Tub/shower unit  Bathroom Toilet: Standard  Bathroom Equipment: Shower chair with back  Home Living Comments: spouse available; works 1:30pm-12:30am  Prior Level of Function:  Prior Function Per Pt/Caregiver Report  Level of [place]: Independent with ADLs and functional transfers, Independent with homemaking with ambulation  Receives Help From: Family  ADL Assistance: Independent  Homemaking Assistance: Independent  Ambulatory Assistance: Independent  Vocational: Full time employment ([employer])  Hand Dominance: Right  Prior Function Comments: denies h/o falls  Precautions:  Precautions  Hearing/Visual Limitations: glasses prn  LE Weight Bearing Status: Heel Weight Bearing in Post-Op Shoe (RLE heel WB'ing with post-op shoe)  Medical Precautions: Fall precautions  Precautions Comment: RLE heel WB'ing with post-op shoe    Objective   Pain:  Pain Assessment  Pain Assessment: 0-10  Pain Score: 0 - No pain  Cognition:  Cognition  Overall Cognitive Status: Within Functional Limits  Orientation Level: Oriented X4    General Assessments:  General Observation  General Observation: Dressing to RLE intact; surgical shoe fitted and donned.    Activity Tolerance  Endurance: Decreased tolerance for upright activites    Sensation  Light Touch: Partial deficits in the RLE, Partial  deficits in the LLE  Sensation Comment: h/o BLE neuropathy    Strength  Strength Comments: BLE grossly >3+/5  Coordination  Coordination Comment: increased time and effort for mobility completion    Postural Control  Posture Comment: forward head; rounded shoulders    Static Sitting Balance  Static Sitting-Balance Support: Bilateral upper extremity supported (BUE on bedside)  Static Sitting-Level of Assistance: Close supervision  Static Sitting-Comment/Number of Minutes: forward flexed posture in seated position    Static Standing Balance  Static Standing-Balance Support: Bilateral upper extremity supported (SW)  Static Standing-Level of Assistance: Close supervision, Minimum assistance  Static Standing-Comment/Number of Minutes: >4 min with use of SW; forward flexed posture  Dynamic Standing Balance  Dynamic Standing-Balance Support: Bilateral upper extremity supported (SW)  Dynamic Standing-Balance: Forward lean  Dynamic Standing-Comments: >5 min with Casey with SW for ambulation; increased lateral sway with 1 minor LOB requiring Casey to correct by therapy  Functional Assessments:  Bed Mobility  Bed Mobility: Yes  Bed Mobility 1  Bed Mobility 1: Supine to sitting  Level of Assistance 1: Close supervision  Bed Mobility Comments 1: use of bed rails    Transfers  Transfer: Yes  Transfer 1  Transfer From 1: Bed to, Stand to  Transfer to 1: Stand, Chair with arms  Technique 1: Sit to stand, Stand to sit  Transfer Device 1: Walker  Transfer Level of Assistance 1: Close supervision, Minimum assistance  Trials/Comments 1: VCs for safe sequencing    Ambulation/Gait Training  Ambulation/Gait Training Performed: Yes  Ambulation/Gait Training 1  Surface 1: Level tile  Device 1: Standard walker  Assistance 1: Minimum assistance  Quality of Gait 1: Narrow base of support, Inconsistent stride length  Comments/Distance (ft) 1: Pt with difficulty maintaining heel WB'ing RLE with surgical shoe; 10ftx2 with SW and Casey. VCs for gait  sequencing. Non-reciprocal gait pattern.  Extremity/Trunk Assessments:  RLE   RLE : Within Functional Limits (generalized weakness noted)  LLE   LLE : Within Functional Limits (mild generalized weakness noted)  Treatments:  Therapeutic Activity  Therapeutic Activity Performed: Yes  Therapeutic Activity 1: Surgical shoe fitted and distributed to patient. Education provided on heel WB'ing to assist with healing process. Pt states verbal understanding.    Bed Mobility  Bed Mobility: Yes  Bed Mobility 1  Bed Mobility 1: Supine to sitting  Level of Assistance 1: Close supervision  Bed Mobility Comments 1: use of bed rails    Ambulation/Gait Training  Ambulation/Gait Training Performed: Yes  Ambulation/Gait Training 1  Surface 1: Level tile  Device 1: Standard walker  Assistance 1: Minimum assistance  Quality of Gait 1: Narrow base of support, Inconsistent stride length  Comments/Distance (ft) 1: Pt with difficulty maintaining heel WB'ing RLE with surgical shoe; 10ftx2 with SW and Casey. VCs for gait sequencing. Non-reciprocal gait pattern.  Transfers  Transfer: Yes  Transfer 1  Transfer From 1: Bed to, Stand to  Transfer to 1: Stand, Chair with arms  Technique 1: Sit to stand, Stand to sit  Transfer Device 1: Walker  Transfer Level of Assistance 1: Close supervision, Minimum assistance  Trials/Comments 1: VCs for safe sequencing  Outcome Measures:  [facility] Basic Mobility  Turning from your back to your side while in a flat bed without using bedrails: None  Moving from lying on your back to sitting on the side of a flat bed without using bedrails: None  Moving to and from bed to chair (including a wheelchair): A little  Standing up from a chair using your arms (e.g. wheelchair or bedside chair): A little  To walk in hospital room: A little  Climbing 3-5 steps with railing: A lot  Basic Mobility - Total Score: 19    Encounter Problems          Mobility       STG - Patient will ambulate 100' with LRAD and modified  independence. (Progressing)       Start:  01/10/24    Expected End:  01/26/24            STG - Patient will ascend and descend four to six stairs with use of B handrails and modified independence. (Progressing)       Start:  01/10/24    Expected End:  01/26/24               PT Problem       The patient will demonstrate an overall strength of 4/5 in BLE to assist with completion of functional mobility.   (Progressing)       Start:  01/15/24    Expected End:  01/26/24            The patient will be able to adhere to heel Wb'ing RLE with use of post-op shoe with SW by DC from hospital and minimal verbal cues. (Progressing)       Start:  01/15/24    Expected End:  01/26/24               Transfers       STG - Patient will transfer sit to and from stand with LRAD and modified independence. (Progressing)       Start:  01/10/24    Expected End:  01/26/24                   Education Documentation  No documentation found.  Education Comments  No comments found.

## 2024-01-15 NOTE — NURSING NOTE
Assumed care of patient after report from departing RN. Pt sitting at edge of bed watching TV and eating dinner, respirations are even and unlabored, and no distress noted on exam. Patient expresses no needs at this time. Bed in locked and lowered position with call light within reach. All other safety measures are intact. Will continue with current plan of care and update as necessary.

## 2024-01-15 NOTE — PROGRESS NOTES
Occupational Therapy    Evaluation/Treatment    Patient Name: Nuris Squires  MRN: [id number]  : 1965  Today's Date: 01/15/24  Time Calculation  Start Time: 1303  Stop Time: 1335  Time Calculation (min): 32 min       Assessment:  Prognosis: Good  Evaluation/Treatment Tolerance: Patient tolerated treatment well  End of Session Patient Position: Up in chair  Prognosis: Good  Evaluation/Treatment Tolerance: Patient tolerated treatment well  Strengths: Attitude of self, Ability to acquire knowledge  Plan:  Treatment Interventions: ADL retraining, Functional transfer training, Endurance training, Patient/family training, Equipment evaluation/education  OT Frequency: 5 times per week  OT Discharge Recommendations: Low intensity level of continued care  OT Recommended Transfer Status: Assist of 1  OT - OK to Discharge: Yes  Treatment Interventions: ADL retraining, Functional transfer training, Endurance training, Patient/family training, Equipment evaluation/education    Subjective   Current Problem:  1. Right foot infection  Case Request Operating Room: Amputation Digit Foot, Right    Case Request Operating Room: Amputation Digit Foot, Right    Surgical Pathology Exam    Surgical Pathology Exam    AFB Culture/Smear    AFB Culture/Smear    Fungal Culture/Smear    Fungal Culture/Smear    Tissue/Wound Culture/Smear    Tissue/Wound Culture/Smear    AFB Culture/Smear    AFB Culture/Smear    Fungal Culture/Smear    Fungal Culture/Smear    Tissue/Wound Culture/Smear    Tissue/Wound Culture/Smear    CANCELED: Fungal Culture/Smear    CANCELED: Fungal Culture/Smear    CANCELED: Tissue/Wound Culture/Smear    CANCELED: Tissue/Wound Culture/Smear      2. Controlled type 1 diabetes mellitus with hyperglycemia (CMS/HCC)        3. Hypertension, unspecified type        4. Acute lower urinary tract infection        5. Peripheral arterial disease (CMS/HCC)  Vascular US PVR Without Exercise    Vascular US PVR Without Exercise         General:   OT Received On: 01/15/24  General  Reason for Referral: OT Eval  Referred By: Dr. Thaddeus MD  Past Medical History Relevant to Rehab: Pt is post R great toe amputation PMH: sickle cell disease, HTN, DM, HLD, morbid obesity, PAD, vertigo  Family/Caregiver Present: No  Co-Treatment: PT  Co-Treatment Reason:  (Optimize Pt outcomes)  Prior to Session Communication: Bedside nurse  Patient Position Received: Bed, 3 rail up  Preferred Learning Style: verbal    Pain:  Pain Assessment  Pain Assessment: 0-10  Pain Score: 0 - No pain    Objective   Cognition:  Orientation Level: Oriented X4  Safety/Judgement:  (impaired, however receptive)     Home Living:  Type of Home: House  Lives With: Significant other  Home Adaptive Equipment: None  Home Layout: One level  Home Access: Stairs to enter with rails  Entrance Stairs-Rails: Both  Entrance Stairs-Number of Steps: 5  Bathroom Shower/Tub: Tub/shower unit  Bathroom Toilet: Standard  Bathroom Equipment: Shower chair with back  Prior Function:  Receives Help From: Family  Homemaking Assistance: Independent  Ambulatory Assistance: Independent  Vocational: Full time employment  IADL History:  Mode of Transportation: Car (Pt is an [employer] )  ADL:  Eating Assistance: Independent  Grooming Assistance: Stand by  Bathing Assistance: Minimal  UE Dressing Assistance: Minimal  LE Dressing Assistance: Moderate  Toileting Assistance with Device: Minimal  Activities of Daily Living: LE Dressing  LE Dressing:  (Mod A for surgical shoe)  Activity Tolerance:  Endurance: Decreased tolerance for upright activites    Bed Mobility/Transfers: Bed Mobility  Bed Mobility:  (supervision)    Transfers  Transfer:  (CG-Min A for standard Walker and LOB, Education with Heel weight bearing only)  Strength:  Strength Comments:  (observed through function to be WFL)    Coordination:  Movements are Fluid and Coordinated: No   Hand Function:  Hand Function  Gross Grasp: Functional  Coordination:  Functional    Outcome Measures: [facility] Daily Activity  Putting on and taking off regular lower body clothing: A lot  Bathing (including washing, rinsing, drying): A lot  Putting on and taking off regular upper body clothing: A little  Toileting, which includes using toilet, bedpan or urinal: A lot  Taking care of personal grooming such as brushing teeth: A little  Eating Meals: None  Daily Activity - Total Score: 16    Education Documentation  ADL Training, taught by Eliz Nunn OT at 1/15/2024  1:42 PM.  Learner: Patient  Readiness: Acceptance  Method: Explanation  Response: Needs Reinforcement    Education Comments  No comments found.        OP EDUCATION:       Goals:  Problem: ADL  Goal: Goal 1  Description: Patient will demonstrate improved ADL skills:  Bathing with Supervision assist with adaptive equipment/DME   Grooming with Supervision       UE Dressing with Supervision          LE Dressing with Supervision assist with adaptive equipment/DME       Toileting with Supervision assist with adaptive equipment/DME       Outcome: Progressing

## 2024-01-15 NOTE — CARE PLAN
The patient's goals for the shift include Safety    The clinical goals for the shift include Patient will remain injury free this shift.      Problem: Pain  Goal: My pain/discomfort is manageable  Outcome: Progressing     Problem: Safety  Goal: Patient will be injury free during hospitalization  Outcome: Progressing     Problem: Daily Care  Goal: Daily care needs are met  Outcome: Progressing     Problem: Psychosocial Needs  Goal: Demonstrates ability to cope with hospitalization/illness  Outcome: Progressing  Goal: Collaborate with me, my family, and caregiver to identify my specific goals  Outcome: Progressing     Problem: Skin  Goal: Prevent/manage excess moisture  Outcome: Progressing  Goal: Prevent/minimize sheer/friction injuries  Outcome: Progressing     Problem: Diabetes  Goal: Achieve decreasing blood glucose levels by end of shift  Outcome: Progressing  Goal: Increase stability of blood glucose readings by end of shift  Outcome: Progressing  Goal: Decrease in ketones present in urine by end of shift  Outcome: Progressing  Goal: Maintain electrolyte levels within acceptable range throughout shift  Outcome: Progressing  Goal: Maintain glucose levels >70mg/dl to <250mg/dl throughout shift  Outcome: Progressing  Goal: Learn about and adhere to nutrition recommendations by end of shift  Outcome: Progressing  Goal: Receive DSME education by end of shift  Outcome: Progressing  Goal: No changes in neurological exam by end of shift  Outcome: Progressing  Goal: Vital signs within normal range for age by end of shift  Outcome: Progressing  Goal: Increase self care and/or family involovement by end of shift  Outcome: Progressing     Problem: Pain - Adult  Goal: Verbalizes/displays adequate comfort level or baseline comfort level  Outcome: Progressing     Problem: Safety - Adult  Goal: Free from fall injury  Outcome: Progressing

## 2024-01-15 NOTE — PROGRESS NOTES
INFECTIOUS DISEASES PROGRESS NOTE    Consulted / following patient for:  Right foot infection    Subjective   Interval History:   No specific complaints.  Understands the need for prolonged antibiotic therapy    Objective   PHYSICAL EXAMINATION  Vital signs:  Visit Vitals  /82 (BP Location: Right arm, Patient Position: Sitting)   Pulse 73   Temp 36.6 °C (97.9 °F) (Oral)   Resp 20      General: No acute distress, not toxic  Extremities: Dressing not removed, right lower extremity.    Antibiotics:  Cefepime day 6    Relevant Results  WBC: 8100  Creatinine: 1.2 ---> 1.5 ---> 2.6 ---> 4.2 ---> 5.3 ---> 5.3 ---> 4.5    Microbiology:  Blood (1/9): Negative X2  Urine (1/9): Low colony count group B streptococcus  Urine (1/11): No pathogens  Wound (1/10): Gram's stain shows no PMNs.  MSSA, Candida glabrata   Operative specimens (1/12): Rare growth of group B streptococcus from 1 specimen          ASSESSMENT:  Right foot osteomyelitis/diabetes mellitus  Patient is stable on the third postoperative day after incision and drainage.  Discussed in detail today with Dr. Pizano.  Partial hallux amputation was performed with ability to preserve the proximal portion of the digit, so that the fixation hardware was not grossly involved.  However, the more proximal Jamshidi needle biopsy specimen is, today, growing Group B streptococcus.  Because of the presence of extensive fixation hardware in the growth of this organism from a bone biopsy, it would be prudent to treat her aggressively for osteomyelitis with a prolonged course of intravenous antibiotics.  This is discussed in detail with Dr. Pizano and with the patient today    Acute kidney injury  Creatinine starting to improve, down to 4.5 today       PLANS:  -    Change antibiotic to ceftriaxone 2 g every 24 hours, anticipating roughly 6 weeks of therapy ending on 2/21/2024  -    Place [facility] for antibiotic therapy  -    Reviewed with Dr. Pizano  -    VENKAT per Dr. Leon, who  prefers placement of Serena rather than PICC line    Outpatient antibiotic orders are in the electronic record, and hardcopy has been placed in the chart                Lucho Hickey MD  [facility] [employer]  Office:  [phone]

## 2024-01-15 NOTE — PROGRESS NOTES
"Nuris Squires is a 58 y.o. female on day 4 of admission presenting with Right foot infection.    Subjective   Patient seen at bedside. Reports no pain to the operative limb today again . No constitutional symptoms at this time.          Physical Exam    Objective     General: Patient seen resting in bed. In NAD with dressing intact to right foot without strikethrough.     Objective:      Vasc: DP and PT pulses palpable b/l. CFT is less than 3 seconds bilateral.  Skin temperature is warm to cool proximal to distal bilateral. Focal increase in temperature to right dorsal foot.      Derm: Surgical incision to dorsal right foot well approximated with sutures intact. Minimal selene-incisional maceration. No signs of dehiscence. No purulent drainage. No sings of necrosis. Previous erythema significantly improved. With decreased calor.      Neuro:  Light touch absent to the foot bilateral.       Ortho: Muscle strength is 5/5 for all pedal groups tested. No pain with palpation of b/l lower extremities.      Last Recorded Vitals  Blood pressure (!) 188/72, pulse 73, temperature 36.6 °C (97.9 °F), temperature source Oral, resp. rate 20, height 1.575 m (5' 2\"), weight 102 kg (225 lb), SpO2 100 %.    Intake/Output last 3 Shifts:  I/O last 3 completed shifts:  In: 960 (9.4 mL/kg) [P.O.:960]  Out: - (0 mL/kg)   Weight: 102.1 kg     Relevant Results  Scheduled medications  amLODIPine, 10 mg, oral, Daily  aspirin, 81 mg, oral, Daily  atorvastatin, 40 mg, oral, Nightly  cefepime, 1 g, intravenous, q24h  cholecalciferol, 2,000 Units, oral, Daily  dicyclomine, 10 mg, oral, 4x daily  estradiol, 2 g, vaginal, Nightly  gabapentin, 300 mg, oral, BID  heparin, 5,000 Units, subcutaneous, q8h  insulin glargine, 45 Units, subcutaneous, Nightly  insulin lispro, 0-15 Units, subcutaneous, TID with meals  insulin lispro, 5 Units, subcutaneous, TID with meals  [Held by provider] magnesium oxide, 400 mg, oral, Daily  multivitamin with minerals, 1 " tablet, oral, Daily  pantoprazole, 40 mg, oral, Daily before breakfast  sennosides-docusate sodium, 2 tablet, oral, Nightly      Continuous medications  sodium bicarbonate 75 mEq, 80 mL/hr, Last Rate: 80 mL/hr (01/14/24 1813)      PRN medications  PRN medications: acetaminophen **OR** acetaminophen **OR** acetaminophen, benzocaine-menthol, dextromethorphan-guaifenesin, dextrose 10 % in water (D10W), dextrose, glucagon, guaiFENesin, meclizine, ondansetron ODT, polyethylene glycol   Results for orders placed or performed during the hospital encounter of 01/09/24 (from the past 24 hour(s))   POCT GLUCOSE   Result Value Ref Range    POCT Glucose 188 (H) 74 - 99 mg/dL   POCT GLUCOSE   Result Value Ref Range    POCT Glucose 167 (H) 74 - 99 mg/dL   POCT GLUCOSE   Result Value Ref Range    POCT Glucose 216 (H) 74 - 99 mg/dL   Renal Function Panel   Result Value Ref Range    Glucose 99 65 - 99 mg/dL    Sodium 141 133 - 145 mmol/L    Potassium 3.4 3.4 - 5.1 mmol/L    Chloride 104 97 - 107 mmol/L    Bicarbonate 24 24 - 31 mmol/L    Urea Nitrogen 29 (H) 8 - 25 mg/dL    Creatinine 4.50 (H) 0.40 - 1.60 mg/dL    eGFR 11 (L) >60 mL/min/1.73m*2    Calcium 8.6 8.5 - 10.4 mg/dL    Phosphorus 4.6 (H) 2.5 - 4.5 mg/dL    Albumin 2.8 (L) 3.5 - 5.0 g/dL    Anion Gap 13 <=19 mmol/L   CBC and Auto Differential   Result Value Ref Range    WBC 8.1 4.4 - 11.3 x10*3/uL    nRBC 0.0 0.0 - 0.0 /100 WBCs    RBC 3.07 (L) 4.00 - 5.20 x10*6/uL    Hemoglobin 9.0 (L) 12.0 - 16.0 g/dL    Hematocrit 28.4 (L) 36.0 - 46.0 %    MCV 93 80 - 100 fL    MCH 29.3 26.0 - 34.0 pg    MCHC 31.7 (L) 32.0 - 36.0 g/dL    RDW 13.0 11.5 - 14.5 %    Platelets 445 150 - 450 x10*3/uL    Neutrophils % 54.3 40.0 - 80.0 %    Immature Granulocytes %, Automated 0.5 0.0 - 0.9 %    Lymphocytes % 31.2 13.0 - 44.0 %    Monocytes % 10.0 2.0 - 10.0 %    Eosinophils % 3.4 0.0 - 6.0 %    Basophils % 0.6 0.0 - 2.0 %    Neutrophils Absolute 4.41 1.20 - 7.70 x10*3/uL    Immature Granulocytes  Absolute, Automated 0.04 0.00 - 0.70 x10*3/uL    Lymphocytes Absolute 2.54 1.20 - 4.80 x10*3/uL    Monocytes Absolute 0.81 0.10 - 1.00 x10*3/uL    Eosinophils Absolute 0.28 0.00 - 0.70 x10*3/uL    Basophils Absolute 0.05 0.00 - 0.10 x10*3/uL   Iron and TIBC   Result Value Ref Range    Iron 48 30 - 160 ug/dL    UIBC 103 (L) 110 - 370 ug/dL    TIBC 151 (L) 228 - 428 ug/dL    % Saturation 32 12 - 50 %   Ferritin   Result Value Ref Range    Ferritin 487 (H) 13 - 150 ng/mL   POCT GLUCOSE   Result Value Ref Range    POCT Glucose 69 (L) 74 - 99 mg/dL   POCT GLUCOSE   Result Value Ref Range    POCT Glucose 263 (H) 74 - 99 mg/dL    Susceptibility data from last 90 days.  Collected Specimen Info Organism Clindamycin Erythromycin Oxacillin Tetracycline Trimethoprim/Sulfamethoxazole Vancomycin   01/12/24 Tissue from DIGIT FIRST, RIGHT FOOT Streptococcus agalactiae (Group B Streptococcus)           Mixed Gram-Positive Bacteria          01/10/24 Tissue/Biopsy from Diabetic Foot Ulcer Methicillin Susceptible Staphylococcus aureus (MSSA) R R S S S S     Candida glabrata           Mixed Gram-Positive Bacteria          01/09/24 Urine from Clean Catch/Voided Streptococcus agalactiae (Group B Streptococcus)                     Assessment/Plan   Principal Problem:    Right foot infection    Cellulitis right foot  Diabetes mellitus, poorly controled with foot ulceration  Hx of digital amputations left foot  Hx of Charcot Neuroarthropathy     Plan:     - Patient was seen and evaluated; all findings were discussed and all questions were answered to patient's satisfaction.  - Charts, labs, vitals and imaging all reviewed.   - Imaging: X-ray per radiology with no significant acute findings suggestive of osteomyelitis, crepitus, or air. CT reviewed with no signs of abscess.     - POD 3 from hallux amputation and midfoot bone biopsy, doing well without constitutional symptoms at this time  - Dressing change performed consisting of betadine  paint, adaptic, 4x4 gauze, ABD and ACE.  - Cx + for Group B strep  - Patient is optimized for discharge with PO abx on board.  -PVR/ALESIA reviewed with triphasic flow b/l       Pain regimen: Per Primary  ABX: Per infectious disease     - Weightbearing Status: Flat foot or heel weightbearing right foot with surgical shoe     - Podiatry will continue to follow while in house.            Brarington Llanos DPM

## 2024-01-16 ENCOUNTER — APPOINTMENT (OUTPATIENT)
Dept: RADIOLOGY | Facility: HOSPITAL | Age: 59
DRG: 617 | End: 2024-01-16
Payer: COMMERCIAL

## 2024-01-16 ENCOUNTER — APPOINTMENT (OUTPATIENT)
Dept: ORTHOPEDIC SURGERY | Facility: CLINIC | Age: 59
End: 2024-01-16
Payer: COMMERCIAL

## 2024-01-16 LAB
ANION GAP SERPL CALC-SCNC: 12 MMOL/L
BUN SERPL-MCNC: 25 MG/DL (ref 8–25)
CALCIUM SERPL-MCNC: 8.7 MG/DL (ref 8.5–10.4)
CHLORIDE SERPL-SCNC: 106 MMOL/L (ref 97–107)
CO2 SERPL-SCNC: 25 MMOL/L (ref 24–31)
CREAT SERPL-MCNC: 3.1 MG/DL (ref 0.4–1.6)
EGFRCR SERPLBLD CKD-EPI 2021: 17 ML/MIN/1.73M*2
ERYTHROCYTE [DISTWIDTH] IN BLOOD BY AUTOMATED COUNT: 13 % (ref 11.5–14.5)
GLUCOSE BLD MANUAL STRIP-MCNC: 123 MG/DL (ref 74–99)
GLUCOSE BLD MANUAL STRIP-MCNC: 139 MG/DL (ref 74–99)
GLUCOSE BLD MANUAL STRIP-MCNC: 181 MG/DL (ref 74–99)
GLUCOSE BLD MANUAL STRIP-MCNC: 264 MG/DL (ref 74–99)
GLUCOSE SERPL-MCNC: 144 MG/DL (ref 65–99)
HCT VFR BLD AUTO: 26.5 % (ref 36–46)
HGB BLD-MCNC: 8.9 G/DL (ref 12–16)
MCH RBC QN AUTO: 29.7 PG (ref 26–34)
MCHC RBC AUTO-ENTMCNC: 33.6 G/DL (ref 32–36)
MCV RBC AUTO: 88 FL (ref 80–100)
NRBC BLD-RTO: 0 /100 WBCS (ref 0–0)
PLATELET # BLD AUTO: 459 X10*3/UL (ref 150–450)
POTASSIUM SERPL-SCNC: 3.6 MMOL/L (ref 3.4–5.1)
RBC # BLD AUTO: 3 X10*6/UL (ref 4–5.2)
SODIUM SERPL-SCNC: 143 MMOL/L (ref 133–145)
WBC # BLD AUTO: 7.5 X10*3/UL (ref 4.4–11.3)

## 2024-01-16 PROCEDURE — 2500000005 HC RX 250 GENERAL PHARMACY W/O HCPCS: Performed by: RADIOLOGY

## 2024-01-16 PROCEDURE — 36558 INSERT TUNNELED CV CATH: CPT

## 2024-01-16 PROCEDURE — 97535 SELF CARE MNGMENT TRAINING: CPT | Mod: GO,CO

## 2024-01-16 PROCEDURE — 36415 COLL VENOUS BLD VENIPUNCTURE: CPT | Performed by: NURSE PRACTITIONER

## 2024-01-16 PROCEDURE — 82947 ASSAY GLUCOSE BLOOD QUANT: CPT

## 2024-01-16 PROCEDURE — C1751 CATH, INF, PER/CENT/MIDLINE: HCPCS

## 2024-01-16 PROCEDURE — 2500000004 HC RX 250 GENERAL PHARMACY W/ HCPCS (ALT 636 FOR OP/ED): Performed by: RADIOLOGY

## 2024-01-16 PROCEDURE — 97530 THERAPEUTIC ACTIVITIES: CPT | Mod: GO,CO

## 2024-01-16 PROCEDURE — 2500000001 HC RX 250 WO HCPCS SELF ADMINISTERED DRUGS (ALT 637 FOR MEDICARE OP): Performed by: STUDENT IN AN ORGANIZED HEALTH CARE EDUCATION/TRAINING PROGRAM

## 2024-01-16 PROCEDURE — 97110 THERAPEUTIC EXERCISES: CPT | Mod: GP

## 2024-01-16 PROCEDURE — 74018 RADEX ABDOMEN 1 VIEW: CPT

## 2024-01-16 PROCEDURE — 02HV33Z INSERTION OF INFUSION DEVICE INTO SUPERIOR VENA CAVA, PERCUTANEOUS APPROACH: ICD-10-PCS | Performed by: RADIOLOGY

## 2024-01-16 PROCEDURE — 80048 BASIC METABOLIC PNL TOTAL CA: CPT | Performed by: INTERNAL MEDICINE

## 2024-01-16 PROCEDURE — 2500000002 HC RX 250 W HCPCS SELF ADMINISTERED DRUGS (ALT 637 FOR MEDICARE OP, ALT 636 FOR OP/ED): Performed by: INTERNAL MEDICINE

## 2024-01-16 PROCEDURE — 2500000001 HC RX 250 WO HCPCS SELF ADMINISTERED DRUGS (ALT 637 FOR MEDICARE OP): Performed by: INTERNAL MEDICINE

## 2024-01-16 PROCEDURE — 2500000004 HC RX 250 GENERAL PHARMACY W/ HCPCS (ALT 636 FOR OP/ED): Performed by: NURSE PRACTITIONER

## 2024-01-16 PROCEDURE — 85027 COMPLETE CBC AUTOMATED: CPT | Performed by: NURSE PRACTITIONER

## 2024-01-16 PROCEDURE — 1200000002 HC GENERAL ROOM WITH TELEMETRY DAILY

## 2024-01-16 PROCEDURE — L3260 AMBULATORY SURGICAL BOOT EAC: HCPCS | Performed by: STUDENT IN AN ORGANIZED HEALTH CARE EDUCATION/TRAINING PROGRAM

## 2024-01-16 PROCEDURE — 0JH63XZ INSERTION OF TUNNELED VASCULAR ACCESS DEVICE INTO CHEST SUBCUTANEOUS TISSUE AND FASCIA, PERCUTANEOUS APPROACH: ICD-10-PCS | Performed by: RADIOLOGY

## 2024-01-16 PROCEDURE — 76942 ECHO GUIDE FOR BIOPSY: CPT

## 2024-01-16 PROCEDURE — 2780000003 HC OR 278 NO HCPCS

## 2024-01-16 PROCEDURE — 2500000004 HC RX 250 GENERAL PHARMACY W/ HCPCS (ALT 636 FOR OP/ED): Performed by: INTERNAL MEDICINE

## 2024-01-16 PROCEDURE — 97530 THERAPEUTIC ACTIVITIES: CPT | Mod: GP

## 2024-01-16 PROCEDURE — 2500000004 HC RX 250 GENERAL PHARMACY W/ HCPCS (ALT 636 FOR OP/ED): Performed by: STUDENT IN AN ORGANIZED HEALTH CARE EDUCATION/TRAINING PROGRAM

## 2024-01-16 RX ORDER — LIDOCAINE HYDROCHLORIDE 20 MG/ML
INJECTION, SOLUTION EPIDURAL; INFILTRATION; INTRACAUDAL; PERINEURAL
Status: COMPLETED | OUTPATIENT
Start: 2024-01-16 | End: 2024-01-16

## 2024-01-16 RX ORDER — MIDAZOLAM HYDROCHLORIDE 1 MG/ML
INJECTION INTRAMUSCULAR; INTRAVENOUS
Status: COMPLETED | OUTPATIENT
Start: 2024-01-16 | End: 2024-01-16

## 2024-01-16 RX ADMIN — HEPARIN SODIUM 5000 UNITS: 5000 INJECTION, SOLUTION INTRAVENOUS; SUBCUTANEOUS at 22:17

## 2024-01-16 RX ADMIN — CEFTRIAXONE SODIUM 2 G: 2 INJECTION, SOLUTION INTRAVENOUS at 16:43

## 2024-01-16 RX ADMIN — INSULIN LISPRO 9 UNITS: 100 INJECTION, SOLUTION INTRAVENOUS; SUBCUTANEOUS at 16:43

## 2024-01-16 RX ADMIN — HEPARIN SODIUM 5000 UNITS: 5000 INJECTION, SOLUTION INTRAVENOUS; SUBCUTANEOUS at 06:23

## 2024-01-16 RX ADMIN — GABAPENTIN 300 MG: 300 CAPSULE ORAL at 08:45

## 2024-01-16 RX ADMIN — ASPIRIN 81 MG: 81 TABLET, CHEWABLE ORAL at 08:45

## 2024-01-16 RX ADMIN — DICYCLOMINE HYDROCHLORIDE 10 MG: 10 CAPSULE ORAL at 06:22

## 2024-01-16 RX ADMIN — GABAPENTIN 300 MG: 300 CAPSULE ORAL at 22:17

## 2024-01-16 RX ADMIN — INSULIN LISPRO 5 UNITS: 100 INJECTION, SOLUTION INTRAVENOUS; SUBCUTANEOUS at 08:45

## 2024-01-16 RX ADMIN — HEPARIN SODIUM 5000 UNITS: 5000 INJECTION, SOLUTION INTRAVENOUS; SUBCUTANEOUS at 14:04

## 2024-01-16 RX ADMIN — MIDAZOLAM HYDROCHLORIDE 1 MG: 1 INJECTION, SOLUTION INTRAMUSCULAR; INTRAVENOUS at 11:21

## 2024-01-16 RX ADMIN — DICYCLOMINE HYDROCHLORIDE 10 MG: 10 CAPSULE ORAL at 14:04

## 2024-01-16 RX ADMIN — ATORVASTATIN CALCIUM 40 MG: 40 TABLET, FILM COATED ORAL at 22:18

## 2024-01-16 RX ADMIN — LIDOCAINE HYDROCHLORIDE 10 ML: 20 INJECTION, SOLUTION EPIDURAL; INFILTRATION; INTRACAUDAL; PERINEURAL at 11:27

## 2024-01-16 RX ADMIN — DICYCLOMINE HYDROCHLORIDE 10 MG: 10 CAPSULE ORAL at 21:00

## 2024-01-16 RX ADMIN — INSULIN LISPRO 5 UNITS: 100 INJECTION, SOLUTION INTRAVENOUS; SUBCUTANEOUS at 16:45

## 2024-01-16 RX ADMIN — DICYCLOMINE HYDROCHLORIDE 10 MG: 10 CAPSULE ORAL at 16:43

## 2024-01-16 RX ADMIN — INSULIN GLARGINE 45 UNITS: 100 INJECTION, SOLUTION SUBCUTANEOUS at 22:19

## 2024-01-16 RX ADMIN — Medication 2000 UNITS: at 06:22

## 2024-01-16 RX ADMIN — AMLODIPINE BESYLATE 10 MG: 10 TABLET ORAL at 08:45

## 2024-01-16 RX ADMIN — PANTOPRAZOLE SODIUM 40 MG: 40 TABLET, DELAYED RELEASE ORAL at 06:23

## 2024-01-16 RX ADMIN — Medication 1 TABLET: at 08:45

## 2024-01-16 ASSESSMENT — PAIN SCALES - GENERAL
PAINLEVEL_OUTOF10: 0 - NO PAIN
PAINLEVEL_OUTOF10: 2
PAINLEVEL_OUTOF10: 0 - NO PAIN

## 2024-01-16 ASSESSMENT — COGNITIVE AND FUNCTIONAL STATUS - GENERAL
PERSONAL GROOMING: A LITTLE
STANDING UP FROM CHAIR USING ARMS: A LITTLE
TOILETING: A LITTLE
EATING MEALS: A LITTLE
MOVING TO AND FROM BED TO CHAIR: A LITTLE
DRESSING REGULAR UPPER BODY CLOTHING: A LITTLE
CLIMB 3 TO 5 STEPS WITH RAILING: A LOT
DAILY ACTIVITIY SCORE: 17
MOBILITY SCORE: 18
HELP NEEDED FOR BATHING: A LITTLE
DRESSING REGULAR LOWER BODY CLOTHING: A LOT
WALKING IN HOSPITAL ROOM: A LITTLE
TURNING FROM BACK TO SIDE WHILE IN FLAT BAD: A LITTLE

## 2024-01-16 ASSESSMENT — ACTIVITIES OF DAILY LIVING (ADL): HOME_MANAGEMENT_TIME_ENTRY: 10

## 2024-01-16 ASSESSMENT — PAIN - FUNCTIONAL ASSESSMENT
PAIN_FUNCTIONAL_ASSESSMENT: 0-10
PAIN_FUNCTIONAL_ASSESSMENT: 0-10
PAIN_FUNCTIONAL_ASSESSMENT: FLACC (FACE, LEGS, ACTIVITY, CRY, CONSOLABILITY)

## 2024-01-16 NOTE — PROGRESS NOTES
"Nuris Squires is a 58 y.o. female on day 5 of admission presenting with Right foot infection.    Subjective   Patient seen for acute kidney injury admitted with infected right foot status post surgery today patient feels fine without any complaints at all no uremic symptoms and no shortness of breath just had luis fernando catheter       Objective     Physical Exam  Neck:      Vascular: No carotid bruit.   Cardiovascular:      Rate and Rhythm: Normal rate and regular rhythm.      Heart sounds: No murmur heard.     No friction rub. No gallop.   Pulmonary:      Breath sounds: No wheezing, rhonchi or rales.   Chest:      Chest wall: No tenderness.   Abdominal:      General: There is no distension.      Tenderness: There is no abdominal tenderness. There is no guarding or rebound.   Musculoskeletal:         General: No swelling or tenderness.      Cervical back: Neck supple.      Right lower leg: No edema.      Left lower leg: No edema.      Comments: Right foot surgery and dressing   Lymphadenopathy:      Cervical: No cervical adenopathy.         Last Recorded Vitals  Blood pressure 144/87, pulse 83, temperature 36.6 °C (97.9 °F), temperature source Oral, resp. rate 16, height 1.575 m (5' 2\"), weight 102 kg (225 lb), SpO2 93 %.    Intake/Output last 3 Shifts:  I/O last 3 completed shifts:  In: 2492 (24.4 mL/kg) [IV Piggyback:2492]  Out: - (0 mL/kg)   Weight: 102.1 kg     Current Facility-Administered Medications:     acetaminophen (Tylenol) tablet 650 mg, 650 mg, oral, q4h PRN **OR** acetaminophen (Tylenol) oral liquid 650 mg, 650 mg, oral, q4h PRN **OR** acetaminophen (Tylenol) suppository 650 mg, 650 mg, rectal, q4h PRN, Bk Whittington DPM    alteplase (Cathflo Activase) injection 2 mg, 2 mg, intra-catheter, PRN, Lucho Hickey MD    amLODIPine (Norvasc) tablet 10 mg, 10 mg, oral, Daily, Brandt Leon MD, 10 mg at 01/16/24 0845    aspirin chewable tablet 81 mg, 81 mg, oral, Daily, Bk Whittington DPM, 81 mg at 01/16/24 " 0845    atorvastatin (Lipitor) tablet 40 mg, 40 mg, oral, Nightly, Bk Whittington DPM, 40 mg at 01/15/24 2159    benzocaine-menthol (Cepastat Sore Throat) 15-3.6 mg lozenge 1 lozenge, 1 lozenge, Mouth/Throat, q2h PRN, Bk Whittington DPM    cefTRIAXone (Rocephin) 2 g IV in dextrose 5% 50 mL, 2 g, intravenous, q24h, Lucho Hickey MD, Stopped at 01/15/24 1906    cholecalciferol (Vitamin D-3) tablet 2,000 Units, 2,000 Units, oral, Daily, Bk Whittington DPM, 2,000 Units at 01/16/24 0622    dextromethorphan-guaifenesin (Robitussin DM)  mg/5 mL oral liquid 5 mL, 5 mL, oral, q4h PRN, Bk Whittington DPM    dextrose 10 % in water (D10W) infusion, 0.3 g/kg/hr, intravenous, Once PRN, Bk Whittington DPM    dextrose 50 % injection 25 g, 25 g, intravenous, q15 min PRN, Bk Whittington DPM    dicyclomine (Bentyl) capsule 10 mg, 10 mg, oral, 4x daily, Bk Whittington DPM, 10 mg at 01/16/24 0622    estradiol (Estrace) 0.01 % (0.1 mg/gram) vaginal cream 2 g, 2 g, vaginal, Nightly, Bk Whittington DPM    gabapentin (Neurontin) capsule 300 mg, 300 mg, oral, BID, Bk Whittington DPM, 300 mg at 01/16/24 0845    glucagon (Glucagen) injection 1 mg, 1 mg, intramuscular, q15 min PRN, Bk Whittington DPM    guaiFENesin (Mucinex) 12 hr tablet 600 mg, 600 mg, oral, q12h PRN, Bk Whittington DPM    heparin (porcine) injection 5,000 Units, 5,000 Units, subcutaneous, q8h, Celeste Hill, APRN-CNP, 5,000 Units at 01/16/24 0623    insulin glargine (Lantus) injection 45 Units, 45 Units, subcutaneous, Nightly, Dariel Travis MD, 45 Units at 01/14/24 2225    insulin lispro (HumaLOG) injection 0-15 Units, 0-15 Units, subcutaneous, TID with meals, Bk Whittington DPM, 12 Units at 01/15/24 1826    insulin lispro (HumaLOG) injection 5 Units, 5 Units, subcutaneous, TID with meals, Dariel Travis MD, 5 Units at 01/16/24 0845    lidocaine (Xylocaine) 10 mg/mL (1 %) injection 50 mg, 5 mL, infiltration, Once, Lucho  ARIEL Hickey MD    [Held by provider] magnesium oxide (Mag-Ox) tablet 400 mg, 400 mg, oral, Daily, Bk Whittington DPM, 400 mg at 01/12/24 0719    meclizine (Antivert) tablet 25 mg, 25 mg, oral, TID PRN, Bk Whittington DPM, 25 mg at 01/15/24 1511    multivitamin with minerals 1 tablet, 1 tablet, oral, Daily, Bk Whittington DPM, 1 tablet at 01/16/24 0845    ondansetron ODT (Zofran-ODT) disintegrating tablet 4 mg, 4 mg, oral, q8h PRN, Bk Whittington DPM    pantoprazole (ProtoNix) EC tablet 40 mg, 40 mg, oral, Daily before breakfast, Bk Whittington DPM, 40 mg at 01/16/24 0623    polyethylene glycol (Glycolax, Miralax) packet 17 g, 17 g, oral, Daily PRN, Bk Whittington DPM    sennosides-docusate sodium (Doris-Colace) 8.6-50 mg per tablet 2 tablet, 2 tablet, oral, Nightly, Bk Whittington DPM, 2 tablet at 01/12/24 2138    sodium bicarbonate 75 mEq in 1000 mL sodium chloride 0.45% solution, 80 mL/hr, intravenous, Continuous, Bk Whittington DPM, Last Rate: 80 mL/hr at 01/16/24 0108, 80 mL/hr at 01/16/24 0108   Relevant Results    Results for orders placed or performed during the hospital encounter of 01/09/24 (from the past 96 hour(s))   AFB Culture/Smear    Specimen: DIGIT FIRST, RIGHT FOOT; Tissue   Result Value Ref Range    AFB Culture       Culture in progress and will be examined weekly. A result will be issued either when positive or after 8 weeks incubation.    AFB Stain No acid fast bacilli seen    Tissue/Wound Culture/Smear    Specimen: DIGIT FIRST, RIGHT FOOT; Tissue   Result Value Ref Range    Tissue/Wound Culture/Smear (A)      (1+) Rare Streptococcus agalactiae (Group B Streptococcus)    Tissue/Wound Culture/Smear (1+) Rare Mixed Gram-Positive Bacteria     Gram Stain No polymorphonuclear leukocytes seen     Gram Stain No organisms seen    AFB Culture/Smear    Specimen: DIGIT FIRST, RIGHT FOOT; Tissue   Result Value Ref Range    AFB Culture       Culture in progress and will be examined weekly.  A result will be issued either when positive or after 8 weeks incubation.    AFB Stain No acid fast bacilli seen    Fungal Culture/Smear    Specimen: DIGIT FIRST, RIGHT FOOT; Tissue   Result Value Ref Range    Fungal Culture/Smear       Culture in progress, a report will be issued when positive or after 2 weeks of incubation.    Fungal Smear No fungal elements seen    Tissue/Wound Culture/Smear    Specimen: DIGIT FIRST, RIGHT FOOT; Tissue   Result Value Ref Range    Tissue/Wound Culture/Smear No growth aerobically and anaerobically     Gram Stain No organisms seen     Gram Stain (1+) Rare Polymorphonuclear leukocytes    POCT GLUCOSE   Result Value Ref Range    POCT Glucose 233 (H) 74 - 99 mg/dL   POCT GLUCOSE   Result Value Ref Range    POCT Glucose 230 (H) 74 - 99 mg/dL   Renal Function Panel   Result Value Ref Range    Glucose 277 (H) 65 - 99 mg/dL    Sodium 132 (L) 133 - 145 mmol/L    Potassium 3.7 3.4 - 5.1 mmol/L    Chloride 98 97 - 107 mmol/L    Bicarbonate 17 (L) 24 - 31 mmol/L    Urea Nitrogen 30 (H) 8 - 25 mg/dL    Creatinine 5.30 (H) 0.40 - 1.60 mg/dL    eGFR 9 (L) >60 mL/min/1.73m*2    Calcium 8.0 (L) 8.5 - 10.4 mg/dL    Phosphorus 5.2 (H) 2.5 - 4.5 mg/dL    Albumin 2.4 (L) 3.5 - 5.0 g/dL    Anion Gap 17 <=19 mmol/L   CBC   Result Value Ref Range    WBC 9.7 4.4 - 11.3 x10*3/uL    nRBC 0.0 0.0 - 0.0 /100 WBCs    RBC 3.21 (L) 4.00 - 5.20 x10*6/uL    Hemoglobin 9.5 (L) 12.0 - 16.0 g/dL    Hematocrit 29.4 (L) 36.0 - 46.0 %    MCV 92 80 - 100 fL    MCH 29.6 26.0 - 34.0 pg    MCHC 32.3 32.0 - 36.0 g/dL    RDW 12.3 11.5 - 14.5 %    Platelets 424 150 - 450 x10*3/uL   POCT GLUCOSE   Result Value Ref Range    POCT Glucose 210 (H) 74 - 99 mg/dL   POCT GLUCOSE   Result Value Ref Range    POCT Glucose 124 (H) 74 - 99 mg/dL   POCT GLUCOSE   Result Value Ref Range    POCT Glucose 159 (H) 74 - 99 mg/dL   Renal Function Panel   Result Value Ref Range    Glucose 181 (H) 65 - 99 mg/dL    Sodium 137 133 - 145 mmol/L     Potassium 3.5 3.4 - 5.1 mmol/L    Chloride 102 97 - 107 mmol/L    Bicarbonate 20 (L) 24 - 31 mmol/L    Urea Nitrogen 33 (H) 8 - 25 mg/dL    Creatinine 5.30 (H) 0.40 - 1.60 mg/dL    eGFR 9 (L) >60 mL/min/1.73m*2    Calcium 8.5 8.5 - 10.4 mg/dL    Phosphorus 5.1 (H) 2.5 - 4.5 mg/dL    Albumin 2.6 (L) 3.5 - 5.0 g/dL    Anion Gap 15 <=19 mmol/L   CBC and Auto Differential   Result Value Ref Range    WBC 8.3 4.4 - 11.3 x10*3/uL    nRBC 0.0 0.0 - 0.0 /100 WBCs    RBC 3.15 (L) 4.00 - 5.20 x10*6/uL    Hemoglobin 9.3 (L) 12.0 - 16.0 g/dL    Hematocrit 28.1 (L) 36.0 - 46.0 %    MCV 89 80 - 100 fL    MCH 29.5 26.0 - 34.0 pg    MCHC 33.1 32.0 - 36.0 g/dL    RDW 12.6 11.5 - 14.5 %    Platelets 427 150 - 450 x10*3/uL    Neutrophils % 66.8 40.0 - 80.0 %    Immature Granulocytes %, Automated 0.4 0.0 - 0.9 %    Lymphocytes % 20.9 13.0 - 44.0 %    Monocytes % 8.3 2.0 - 10.0 %    Eosinophils % 3.0 0.0 - 6.0 %    Basophils % 0.6 0.0 - 2.0 %    Neutrophils Absolute 5.54 1.20 - 7.70 x10*3/uL    Immature Granulocytes Absolute, Automated 0.03 0.00 - 0.70 x10*3/uL    Lymphocytes Absolute 1.73 1.20 - 4.80 x10*3/uL    Monocytes Absolute 0.69 0.10 - 1.00 x10*3/uL    Eosinophils Absolute 0.25 0.00 - 0.70 x10*3/uL    Basophils Absolute 0.05 0.00 - 0.10 x10*3/uL   POCT GLUCOSE   Result Value Ref Range    POCT Glucose 133 (H) 74 - 99 mg/dL   POCT GLUCOSE   Result Value Ref Range    POCT Glucose 188 (H) 74 - 99 mg/dL   POCT GLUCOSE   Result Value Ref Range    POCT Glucose 167 (H) 74 - 99 mg/dL   POCT GLUCOSE   Result Value Ref Range    POCT Glucose 216 (H) 74 - 99 mg/dL   Renal Function Panel   Result Value Ref Range    Glucose 99 65 - 99 mg/dL    Sodium 141 133 - 145 mmol/L    Potassium 3.4 3.4 - 5.1 mmol/L    Chloride 104 97 - 107 mmol/L    Bicarbonate 24 24 - 31 mmol/L    Urea Nitrogen 29 (H) 8 - 25 mg/dL    Creatinine 4.50 (H) 0.40 - 1.60 mg/dL    eGFR 11 (L) >60 mL/min/1.73m*2    Calcium 8.6 8.5 - 10.4 mg/dL    Phosphorus 4.6 (H) 2.5 - 4.5  mg/dL    Albumin 2.8 (L) 3.5 - 5.0 g/dL    Anion Gap 13 <=19 mmol/L   CBC and Auto Differential   Result Value Ref Range    WBC 8.1 4.4 - 11.3 x10*3/uL    nRBC 0.0 0.0 - 0.0 /100 WBCs    RBC 3.07 (L) 4.00 - 5.20 x10*6/uL    Hemoglobin 9.0 (L) 12.0 - 16.0 g/dL    Hematocrit 28.4 (L) 36.0 - 46.0 %    MCV 93 80 - 100 fL    MCH 29.3 26.0 - 34.0 pg    MCHC 31.7 (L) 32.0 - 36.0 g/dL    RDW 13.0 11.5 - 14.5 %    Platelets 445 150 - 450 x10*3/uL    Neutrophils % 54.3 40.0 - 80.0 %    Immature Granulocytes %, Automated 0.5 0.0 - 0.9 %    Lymphocytes % 31.2 13.0 - 44.0 %    Monocytes % 10.0 2.0 - 10.0 %    Eosinophils % 3.4 0.0 - 6.0 %    Basophils % 0.6 0.0 - 2.0 %    Neutrophils Absolute 4.41 1.20 - 7.70 x10*3/uL    Immature Granulocytes Absolute, Automated 0.04 0.00 - 0.70 x10*3/uL    Lymphocytes Absolute 2.54 1.20 - 4.80 x10*3/uL    Monocytes Absolute 0.81 0.10 - 1.00 x10*3/uL    Eosinophils Absolute 0.28 0.00 - 0.70 x10*3/uL    Basophils Absolute 0.05 0.00 - 0.10 x10*3/uL   Iron and TIBC   Result Value Ref Range    Iron 48 30 - 160 ug/dL    UIBC 103 (L) 110 - 370 ug/dL    TIBC 151 (L) 228 - 428 ug/dL    % Saturation 32 12 - 50 %   Ferritin   Result Value Ref Range    Ferritin 487 (H) 13 - 150 ng/mL   POCT GLUCOSE   Result Value Ref Range    POCT Glucose 69 (L) 74 - 99 mg/dL   POCT GLUCOSE   Result Value Ref Range    POCT Glucose 263 (H) 74 - 99 mg/dL   POCT GLUCOSE   Result Value Ref Range    POCT Glucose 312 (H) 74 - 99 mg/dL   POCT GLUCOSE   Result Value Ref Range    POCT Glucose 188 (H) 74 - 99 mg/dL   POCT GLUCOSE   Result Value Ref Range    POCT Glucose 76 74 - 99 mg/dL   Basic Metabolic Panel   Result Value Ref Range    Glucose 144 (H) 65 - 99 mg/dL    Sodium 143 133 - 145 mmol/L    Potassium 3.6 3.4 - 5.1 mmol/L    Chloride 106 97 - 107 mmol/L    Bicarbonate 25 24 - 31 mmol/L    Urea Nitrogen 25 8 - 25 mg/dL    Creatinine 3.10 (H) 0.40 - 1.60 mg/dL    eGFR 17 (L) >60 mL/min/1.73m*2    Calcium 8.7 8.5 - 10.4 mg/dL     Anion Gap 12 <=19 mmol/L   CBC   Result Value Ref Range    WBC 7.5 4.4 - 11.3 x10*3/uL    nRBC 0.0 0.0 - 0.0 /100 WBCs    RBC 3.00 (L) 4.00 - 5.20 x10*6/uL    Hemoglobin 8.9 (L) 12.0 - 16.0 g/dL    Hematocrit 26.5 (L) 36.0 - 46.0 %    MCV 88 80 - 100 fL    MCH 29.7 26.0 - 34.0 pg    MCHC 33.6 32.0 - 36.0 g/dL    RDW 13.0 11.5 - 14.5 %    Platelets 459 (H) 150 - 450 x10*3/uL   POCT GLUCOSE   Result Value Ref Range    POCT Glucose 139 (H) 74 - 99 mg/dL   POCT GLUCOSE   Result Value Ref Range    POCT Glucose 123 (H) 74 - 99 mg/dL       Assessment/Plan   1.  Acute kidney injury renal function continues to improve creatinine level is down to 3.4 no indication for dialysis therapy    2.  Chronic kidney disease stage III  - baseline Cr 1.2-1.5  3.  Cellulitis of the foot continue antibiotics per ID  4.  Diabetes mellitus               Brandt Leon MD

## 2024-01-16 NOTE — PROGRESS NOTES
BRIEF  ID  CHART  REVIEW  NOTE      Chart reviewed  Vital signs stable  Patient is off the floor undergoing placement of a Serena catheter  All orders for post-discharge intravenous antibiotics and follow-up with Infectious Diseases are in the chart and in my note from 1/15    Lucho Hickey MD  [facility]  Office:  [phone]

## 2024-01-16 NOTE — PROGRESS NOTES
Physical Therapy    Physical Therapy Treatment    Patient Name: Nuris Squires  MRN: [id number]  Today's Date: 1/16/2024  Time Calculation  Start Time: 0945  Stop Time: 1015  Time Calculation (min): 30 min       Assessment/Plan   PT Assessment  End of Session Communication: Bedside nurse  Assessment Comment: pt required close supervision to min assist of 1 for the above limited mobility; Alerted nurse to BP readings. Requires  min hands on assist during amb with SW.  End of Session Patient Position: Bed, 2 rail up  PT Plan  Inpatient/Swing Bed or Outpatient: Inpatient  PT Plan  Treatment/Interventions: Bed mobility, Transfer training, Gait training, Stair training, Balance training, Neuromuscular re-education, Strengthening, Endurance training, Range of motion, Therapeutic exercise, Therapeutic activity, Home exercise program, Orthotic fitting/training  PT Plan: Skilled PT  PT Frequency: 4 times per week  PT Discharge Recommendations: Low intensity level of continued care (24/7 S/A for safety)  Equipment Recommended upon Discharge: Straight cane  PT Recommended Transfer Status: Contact guard  PT - OK to Discharge: Yes      General Visit Information:   PT  Visit  PT Received On: 01/16/24  General  Prior to Session Communication: Bedside nurse  Patient Position Received: Bed, 3 rail up  Preferred Learning Style: verbal, visual  General Comment: cleared by nurse for therapy; pt agreeable to therapy    Subjective   Precautions:  Precautions  Hearing/Visual Limitations: glasses prn  LE Weight Bearing Status: Heel Weight Bearing in Post-Op Shoe (right LE)  Medical Precautions: Fall precautions  Vital Signs:  Vital Signs  Heart Rate: 89  SpO2: 96 %  Patient Position: Sitting    Objective   Pain:  Pain Assessment  Pain Assessment: 0-10  Pain Score: 0 - No pain  Cognition:  Cognition  Overall Cognitive Status: Within Functional Limits  Postural Control:  Postural Control  Posture Comment: forward head; rounded  shoulders  Extremity/Trunk Assessments:    Activity Tolerance:  Activity Tolerance  Endurance: Decreased tolerance for upright activites  Treatments:  Therapeutic Exercise  Therapeutic Exercise Performed: Yes  Therapeutic Exercise Activity 1: seated nigel AP x 20 reps  Therapeutic Exercise Activity 2: seated nigel LAQ's x 20 reps  Therapeutic Exercise Activity 3: seated nigel marching x 20 reps  Therapeutic Exercise Activity 4: seated nigel hip abd/add x 20 reps    Therapeutic Activity  Therapeutic Activity Performed: Yes (see bed mobility, transfers, amb with SW comments; PT donned right post-op shoe with pt sitting edge of bed)    Bed Mobility  Bed Mobility: Yes  Bed Mobility 1  Bed Mobility 1: Supine to sitting  Level of Assistance 1: Close supervision  Bed Mobility Comments 1: head of bed elevated 45 degrees; use of bedrails  Bed Mobility 2  Bed Mobility  2: Sitting to supine  Level of Assistance 2: Close supervision  Bed Mobility Comments 2: head of bed elevated 45 degrees    Ambulation/Gait Training  Ambulation/Gait Training Performed: Yes  Ambulation/Gait Training 1  Surface 1: Level tile  Device 1: Standard walker  Assistance 1: Minimum assistance, Minimal verbal cues  Quality of Gait 1: Inconsistent stride length  Comments/Distance (ft) 1: pt amb 15 ft x 2 with + turns, SW, min assist of 1, verbal cues for erect posture, heel weightbearing right foot on post-op shoe---pt c/o lightheadedness after 15 ft , standing rest break then returned to [facility] edge of bed. /78 edge of bed; /93 standing with SW. Alerted nurse Kallie.  Transfers  Transfer: Yes  Transfer 1  Transfer From 1: Sit to  Transfer to 1: Stand  Technique 1: Sit to stand  Transfer Device 1: Walker  Transfer Level of Assistance 1: Close supervision, Minimal verbal cues  Trials/Comments 1: verbal cues for proper nigel hand placement  Transfers 2  Transfer From 2: Stand to  Transfer to 2: Sit  Technique 2: Stand to sit  Transfer Device 2:  Walker  Transfer Level of Assistance 2: Close supervision  Trials/Comments 2: verbal cues for reaching back with both hands prior to attempting to sit    Outcome Measures:  [facility] Basic Mobility  Turning from your back to your side while in a flat bed without using bedrails: None  Moving from lying on your back to sitting on the side of a flat bed without using bedrails: A little  Moving to and from bed to chair (including a wheelchair): A little  Standing up from a chair using your arms (e.g. wheelchair or bedside chair): A little  To walk in hospital room: A little  Climbing 3-5 steps with railing: A lot  Basic Mobility - Total Score: 18    Education Documentation  No documentation found.  Education Comments  No comments found.        OP EDUCATION:       Encounter Problems       Encounter Problems (Active)             Mobility       STG - Patient will ambulate 100' with LRAD and modified independence. (Progressing)       Start:  01/10/24    Expected End:  01/16/24            STG - Patient will ascend and descend four to six stairs with use of B handrails and modified independence. (Progressing)       Start:  01/10/24    Expected End:  01/16/24               PT Problem       The patient will demonstrate an overall strength of 4/5 in BLE to assist with completion of functional mobility.   (Progressing)       Start:  01/15/24    Expected End:  01/16/24            The patient will be able to adhere to heel Wb'ing RLE with use of post-op shoe with SW by DC from hospital and minimal verbal cues. (Progressing)       Start:  01/15/24    Expected End:  01/16/24                 Transfers       STG - Patient will transfer sit to and from stand with LRAD and modified independence. (Progressing)       Start:  01/10/24    Expected End:  01/16/24

## 2024-01-16 NOTE — PROGRESS NOTES
Occupational Therapy    OT Treatment    Patient Name: Nuris Squires  MRN: [id number]  Today's Date: 1/16/2024  Time Calculation  Start Time: 1605  Stop Time: 1629  Time Calculation (min): 24 min         Assessment:  OT Assessment: Fair progress made towards OT goals. Continue with current OT POC to increase strength, balance and functional tolerance for safety and independence during ADLs in the least restrictive environment.  Evaluation/Treatment Tolerance: Patient tolerated treatment well  End of Session Communication: PCT/NA/CTA  End of Session Patient Position: Bed, 2 rail up, Alarm off, not on at start of session (seated EOB with all needs in reach)  Evaluation/Treatment Tolerance: Patient tolerated treatment well  Plan:  Treatment Interventions: ADL retraining, Functional transfer training, Endurance training, Patient/family training, Equipment evaluation/education  OT Frequency: 5 times per week  OT Discharge Recommendations: Low intensity level of continued care  OT Recommended Transfer Status: Assist of 1  OT - OK to Discharge: Yes  Treatment Interventions: ADL retraining, Functional transfer training, Endurance training, Patient/family training, Equipment evaluation/education    Subjective   Previous Visit Info:  OT Last Visit  OT Received On: 01/16/24  General:  General  Reason for Referral: impaired ADLs s/p R great toe amputation- heel weight bearing  Past Medical History Relevant to Rehab: sickle cell disease, HTN, DM, HLD, morbid obesity, PAD, vertigo  Prior to Session Communication: Bedside nurse  Patient Position Received: Bed, 3 rail up, Alarm off, not on at start of session  General Comment: Pt cleared for OT session per nursing, pt pleasant and cooperative this date. Questions, comments and concerns addressed regarding OT.  Precautions:  LE Weight Bearing Status: Heel Weight Bearing in Post-Op Shoe (RLE)  Medical Precautions: Fall precautions  Precautions Comment: RLE heel weight bearing with  post-op shoe  Vital Signs:     Pain:  Pain Assessment  Pain Assessment: 0-10  Pain Score: 2  Pain Type: Surgical pain  Pain Location: Foot  Pain Orientation: Right  Clinical Progression: Not changed    Objective    Cognition:  Cognition  Overall Cognitive Status: Within Functional Limits  Orientation Level: Oriented X4  Insight: Within function limits  Impulsive: Within functional limits  Processing Speed: Within funtional limits  Coordination:  Movements are Fluid and Coordinated: No  Upper Body Coordination: WFL  Lower Body Coordination: slower rate of movement RLE>LLE  Activities of Daily Living: Grooming  Grooming Comments: pt pleasantly declined participation in ADLs at this time secondary to completing with nursing staff prior to OT session    LE Dressing  LE Dressing: Yes  LE Dressing Comments: verbal instruction provided on don/doff surgical shoe- pt able to don shoe with Casey this date while seated EOB using cross-leg technique and increased time.  Functional Standing Tolerance:  Time: x5min  Activity: dynamic standing trials with standard walker and close supervision  Functional Standing Tolerance Comments: Standing trials completed this date to increase functional tolerance and challenge balance to maximize safety and independence upon home-going. Throughout trials pt able to unilaterally reach R<>L and cross midline with min displacement OBS while maintaining Fair+ balance and maintaining RLE heel weight bearing restriction.  Bed Mobility/Transfers: Bed Mobility  Bed Mobility: Yes  Bed Mobility 1  Bed Mobility 1: Supine to sitting  Level of Assistance 1: Close supervision  Bed Mobility Comments 1: HOB minimally elevated and use of bed rails required for task completion. Pt remained seated EOB with all needs in reach at end of session.    Transfers  Transfer: Yes  Transfer 1  Trials/Comments 1: sit<>stands completed from standard EOB height with standard walker and close supervision- verbal cues required  for proper hand placement to increase safety and decrease risk of falls.  Modalities:     IADL's: Meal Prep  Meal Preparation: meal prep simulation completed in standing with standard walker and close supervision. EC/WS education reviewed at this time.    Other Activity:  Other Activity Performed: Yes  Other Activity 1: To increase functional tolerance for safety and independence upon home-going pt able to complete functional mobility at minimal household distances with standard walker and close supervision.      Outcome Measures:[facility] Daily Activity  Putting on and taking off regular lower body clothing: A lot  Bathing (including washing, rinsing, drying): A little  Putting on and taking off regular upper body clothing: A little  Toileting, which includes using toilet, bedpan or urinal: A little  Taking care of personal grooming such as brushing teeth: A little  Eating Meals: A little  Daily Activity - Total Score: 17        Education Documentation  ADL Training, taught by RANDY Christianson at 1/16/2024  6:03 PM.  Learner: Patient  Readiness: Acceptance  Method: Explanation, Demonstration  Response: Verbalizes Understanding    Education Comments  Education reviewed on role of OT/ POC, importance of OT to maximize safety and independence during functional tasks and safety awareness throughout functional tasks/ transfers. Questions, comments and concerns addressed regarding OT.      Goals:  Encounter Problems       Encounter Problems (Active)       ADL       Goal 1 (Progressing)       Start:  01/15/24    Expected End:  01/16/24       Patient will demonstrate improved ADL skills:  Bathing with Supervision assist with adaptive equipment/DME   Grooming with Supervision       UE Dressing with Supervision          LE Dressing with Supervision assist with adaptive equipment/DME       Toileting with Supervision assist with adaptive equipment/DME                 Mobility       STG - Patient will ambulate 100' with LRAD  and modified independence. (Progressing)       Start:  01/10/24    Expected End:  01/16/24            STG - Patient will ascend and descend four to six stairs with use of B handrails and modified independence. (Progressing)       Start:  01/10/24    Expected End:  01/16/24               Transfers       STG - Patient will transfer sit to and from stand with LRAD and modified independence. (Progressing)       Start:  01/10/24    Expected End:  01/16/24

## 2024-01-16 NOTE — PROGRESS NOTES
Patient not medically clear. Patient received a Serena catheter on this day. Patient will return home with [facility] for dressing changes and IV antibiotics. [facility] requested that  Infusion price out the medication. [facility] also requested that Nina Abreu CNP place the internal [facility] referral, still not ordered. Will follow up tomorrow.     **PATIENT DOES NOT HAVE A SAFE DISCHARGE PLAN      Cesia Garcia RN

## 2024-01-16 NOTE — NURSING NOTE
BSSR given and abdominal xray results are not back yet, Patient is in bed with call light and possessions within reach.

## 2024-01-16 NOTE — PROGRESS NOTES
"Nuris Squires is a 58 y.o. female on day 5 of admission presenting with Right foot infection.    Subjective   Patient was not seen today due to Serena catheter placement         Physical Exam    Objective     Physical exam deferred due to patient absence.      Last Recorded Vitals  Blood pressure 155/76, pulse 86, temperature 36.6 °C (97.9 °F), temperature source Oral, resp. rate 16, height 1.575 m (5' 2\"), weight 102 kg (225 lb), SpO2 93 %.    Intake/Output last 3 Shifts:  I/O last 3 completed shifts:  In: 2492 (24.4 mL/kg) [IV Piggyback:2492]  Out: - (0 mL/kg)   Weight: 102.1 kg     Relevant Results  Scheduled medications  amLODIPine, 10 mg, oral, Daily  aspirin, 81 mg, oral, Daily  atorvastatin, 40 mg, oral, Nightly  cefTRIAXone, 2 g, intravenous, q24h  cholecalciferol, 2,000 Units, oral, Daily  dicyclomine, 10 mg, oral, 4x daily  estradiol, 2 g, vaginal, Nightly  gabapentin, 300 mg, oral, BID  heparin, 5,000 Units, subcutaneous, q8h  insulin glargine, 45 Units, subcutaneous, Nightly  insulin lispro, 0-15 Units, subcutaneous, TID with meals  insulin lispro, 5 Units, subcutaneous, TID with meals  lidocaine, 5 mL, infiltration, Once  [Held by provider] magnesium oxide, 400 mg, oral, Daily  multivitamin with minerals, 1 tablet, oral, Daily  pantoprazole, 40 mg, oral, Daily before breakfast  sennosides-docusate sodium, 2 tablet, oral, Nightly      Continuous medications  sodium bicarbonate 75 mEq, 80 mL/hr, Last Rate: 80 mL/hr (01/16/24 0108)      PRN medications  PRN medications: acetaminophen **OR** acetaminophen **OR** acetaminophen, alteplase, benzocaine-menthol, dextromethorphan-guaifenesin, dextrose 10 % in water (D10W), dextrose, glucagon, guaiFENesin, meclizine, ondansetron ODT, polyethylene glycol   Results for orders placed or performed during the hospital encounter of 01/09/24 (from the past 24 hour(s))   POCT GLUCOSE   Result Value Ref Range    POCT Glucose 263 (H) 74 - 99 mg/dL   POCT GLUCOSE   Result " Value Ref Range    POCT Glucose 312 (H) 74 - 99 mg/dL   POCT GLUCOSE   Result Value Ref Range    POCT Glucose 188 (H) 74 - 99 mg/dL   POCT GLUCOSE   Result Value Ref Range    POCT Glucose 76 74 - 99 mg/dL   Basic Metabolic Panel   Result Value Ref Range    Glucose 144 (H) 65 - 99 mg/dL    Sodium 143 133 - 145 mmol/L    Potassium 3.6 3.4 - 5.1 mmol/L    Chloride 106 97 - 107 mmol/L    Bicarbonate 25 24 - 31 mmol/L    Urea Nitrogen 25 8 - 25 mg/dL    Creatinine 3.10 (H) 0.40 - 1.60 mg/dL    eGFR 17 (L) >60 mL/min/1.73m*2    Calcium 8.7 8.5 - 10.4 mg/dL    Anion Gap 12 <=19 mmol/L   CBC   Result Value Ref Range    WBC 7.5 4.4 - 11.3 x10*3/uL    nRBC 0.0 0.0 - 0.0 /100 WBCs    RBC 3.00 (L) 4.00 - 5.20 x10*6/uL    Hemoglobin 8.9 (L) 12.0 - 16.0 g/dL    Hematocrit 26.5 (L) 36.0 - 46.0 %    MCV 88 80 - 100 fL    MCH 29.7 26.0 - 34.0 pg    MCHC 33.6 32.0 - 36.0 g/dL    RDW 13.0 11.5 - 14.5 %    Platelets 459 (H) 150 - 450 x10*3/uL   POCT GLUCOSE   Result Value Ref Range    POCT Glucose 139 (H) 74 - 99 mg/dL    Susceptibility data from last 90 days.  Collected Specimen Info Organism Clindamycin Erythromycin Oxacillin Tetracycline Trimethoprim/Sulfamethoxazole Vancomycin   01/12/24 Tissue from DIGIT FIRST, RIGHT FOOT Streptococcus agalactiae (Group B Streptococcus)           Mixed Gram-Positive Bacteria          01/10/24 Tissue/Biopsy from Diabetic Foot Ulcer Methicillin Susceptible Staphylococcus aureus (MSSA) R R S S S S     Candida glabrata           Mixed Gram-Positive Bacteria          01/09/24 Urine from Clean Catch/Voided Streptococcus agalactiae (Group B Streptococcus)                     Assessment/Plan   Principal Problem:    Right foot infection    Cellulitis right foot  Diabetes mellitus, poorly controled with foot ulceration  Hx of digital amputations left foot  Hx of Charcot Neuroarthropathy     Plan:     - Charts, labs, vitals and imaging all reviewed.   - Imaging: X-ray per radiology with no significant acute  findings suggestive of osteomyelitis, crepitus, or air. CT reviewed with no signs of abscess.     - POD 4 from hallux amputation and midfoot bone biopsy, doing well without constitutional symptoms at this time  - Dressing change performed consisting of betadine paint, adaptic, 4x4 gauze, ABD and ACE.  - Intraoperative: Distal Amputated Toe Cx + for Group B strep  - Intraoperative: Proximal Medial cuneiform Bone Biopsy: No growth/ (1+) Rare Polymorphonuclear leukocytes ( Later specimen Labeled Digit First is bone biopsy)  - Patient receiving Serena Catheter today  - Patient is optimized for discharge with abx per ID  -PVR/ALESIA reviewed with triphasic flow b/l       Pain regimen: Per Primary  ABX: Per infectious disease     - Weightbearing Status: Flat foot or heel weightbearing right foot with surgical shoe     - Podiatry will continue to follow while in house.            Barrington Llanos DPM

## 2024-01-16 NOTE — PROGRESS NOTES
Nuris Squires is a 58 y.o. female on day 5 of admission presenting with Right foot infection.    Subjective   Patient seen and examined.  Resting in bed in no acute distress.  Awake alert oriented x 3.  No new complaints.  No pain.  No fevers chills or sweats.  Intermittent dizziness when up.  Intermittent abdominal discomfort, diarrhea.  No nausea, vomiting.  Tolerating diet.      Therapy notes reviewed, lightheaded after ambulating 15 feet, standing rest break, returned to sitting. /78 sitting, 130/93 standing    Objective     Physical Exam  Vitals reviewed.   Constitutional:       General: She is not in acute distress.     Appearance: She is obese. She is not ill-appearing or toxic-appearing.   HENT:      Head: Normocephalic and atraumatic.      Right Ear: Tympanic membrane normal.      Left Ear: Tympanic membrane normal.      Nose: Nose normal.      Mouth/Throat:      Mouth: Mucous membranes are moist.      Pharynx: Oropharynx is clear.   Eyes:      Extraocular Movements: Extraocular movements intact.      Conjunctiva/sclera: Conjunctivae normal.      Pupils: Pupils are equal, round, and reactive to light.   Cardiovascular:      Rate and Rhythm: Normal rate and regular rhythm.      Pulses: Normal pulses.      Heart sounds: Normal heart sounds.   Pulmonary:      Effort: Pulmonary effort is normal. No respiratory distress.      Breath sounds: Normal breath sounds. No wheezing, rhonchi or rales.      Comments: 2 liters nasal cannula, no home oxygen  Abdominal:      General: Bowel sounds are normal. There is no distension.      Palpations: Abdomen is soft.      Tenderness: There is abdominal tenderness.   Genitourinary:     Comments: Deferred  Musculoskeletal:         General: Swelling present. No tenderness. Normal range of motion.      Cervical back: Normal range of motion and neck supple.      Comments: Right foot dressing clean dry intact, boot in place   Skin:     General: Skin is warm and dry.       "Capillary Refill: Capillary refill takes less than 2 seconds.      Comments: Right foot dressing clean dry intact, boot in place   Neurological:      General: No focal deficit present.      Mental Status: She is alert and oriented to person, place, and time.   Psychiatric:         Mood and Affect: Mood normal.         Behavior: Behavior normal.       Last Recorded Vitals  Blood pressure 144/87, pulse 83, temperature 36.6 °C (97.9 °F), temperature source Oral, resp. rate 16, height 1.575 m (5' 2\"), weight 102 kg (225 lb), SpO2 93 %.    Intake/Output last 3 Shifts:  I/O last 3 completed shifts:  In: 2492 (24.4 mL/kg) [IV Piggyback:2492]  Out: - (0 mL/kg)   Weight: 102.1 kg     Telemetry normal sinus rhythm rate 80's    Relevant Results  Results for orders placed or performed during the hospital encounter of 01/09/24 (from the past 24 hour(s))   POCT GLUCOSE   Result Value Ref Range    POCT Glucose 312 (H) 74 - 99 mg/dL   POCT GLUCOSE   Result Value Ref Range    POCT Glucose 188 (H) 74 - 99 mg/dL   POCT GLUCOSE   Result Value Ref Range    POCT Glucose 76 74 - 99 mg/dL   Basic Metabolic Panel   Result Value Ref Range    Glucose 144 (H) 65 - 99 mg/dL    Sodium 143 133 - 145 mmol/L    Potassium 3.6 3.4 - 5.1 mmol/L    Chloride 106 97 - 107 mmol/L    Bicarbonate 25 24 - 31 mmol/L    Urea Nitrogen 25 8 - 25 mg/dL    Creatinine 3.10 (H) 0.40 - 1.60 mg/dL    eGFR 17 (L) >60 mL/min/1.73m*2    Calcium 8.7 8.5 - 10.4 mg/dL    Anion Gap 12 <=19 mmol/L   CBC   Result Value Ref Range    WBC 7.5 4.4 - 11.3 x10*3/uL    nRBC 0.0 0.0 - 0.0 /100 WBCs    RBC 3.00 (L) 4.00 - 5.20 x10*6/uL    Hemoglobin 8.9 (L) 12.0 - 16.0 g/dL    Hematocrit 26.5 (L) 36.0 - 46.0 %    MCV 88 80 - 100 fL    MCH 29.7 26.0 - 34.0 pg    MCHC 33.6 32.0 - 36.0 g/dL    RDW 13.0 11.5 - 14.5 %    Platelets 459 (H) 150 - 450 x10*3/uL   POCT GLUCOSE   Result Value Ref Range    POCT Glucose 139 (H) 74 - 99 mg/dL   POCT GLUCOSE   Result Value Ref Range    POCT Glucose " 123 (H) 74 - 99 mg/dL     Susceptibility data from last 90 days.  Collected Specimen Info Organism Clindamycin Erythromycin Oxacillin Tetracycline Trimethoprim/Sulfamethoxazole Vancomycin   01/12/24 Tissue from DIGIT FIRST, RIGHT FOOT Streptococcus agalactiae (Group B Streptococcus)           Mixed Gram-Positive Bacteria          01/10/24 Tissue/Biopsy from Diabetic Foot Ulcer Methicillin Susceptible Staphylococcus aureus (MSSA) R R S S S S     Candida glabrata           Mixed Gram-Positive Bacteria          01/09/24 Urine from Clean Catch/Voided Streptococcus agalactiae (Group B Streptococcus)               FL insert tunneled LELE ANUPAM catheter wo port pump > 5 years    Result Date: 1/16/2024  Interpreted By:  Elie Hussein, STUDY: FL INSERT TUNNELED LELE ANUPAM CATHETER WO PORT PUMP > 5 YEARS; US GUIDED PERCUTANEOUS PLACEMENT; 1/16/2024 11:55 am; 1/16/2024 11:38 am   INDICATION: Right foot, requiring extended term intravenous antibiotics; referred for insertion of a tunneled central venous catheter.   COMPARISON: None   ACCESSION NUMBER(S): [id number]; [id number]   ORDERING CLINICIAN: SOHA DELUNA   TECHNIQUE: Ultrasound-guided vascular access Fluoroscopy guided insertion of tunneled central venous catheter without port Fluoroscopy time:2 seconds Images: 1 Dose: 0.33 mGy air kerma   FINDINGS: Informed consent obtained. Patient positioned supine and connected to physiologic monitoring.  All elements of maximal sterile barrier were utilized including cap, mask, sterile gown, sterile gloves, large sterile drape, hand scrub, 2% chlorhexidine for skin cleaning and sterile ultrasound guidance. Ultrasound of right neck demonstrated patent internal jugular vein. Under ultrasound guidance, access into the vein was established with micro puncture and permanent image archived. Small dermatotomy made few cm below clavicle. A 5 [place] single lumen cuffed hemodialysis catheter (Palindrome) was tunneled from dermatotomy to  venotomy. Introducer sheath exchanged for a peel-away sheath. Distal end of the tunneled catheter advanced centrally through the peel-away sheath. Fluoroscopy confirmed tip of catheter at lower SVC. The catheter aspirated and flushed freely, secured, and covered with dressing. Patient tolerated procedure well.       Ultrasound and fluoroscopy guided insertion of tunneled central venous catheter.     Signed by: Elie Hussein 1/16/2024 12:44 PM Dictation workstation:   [id number]    US guided percutaneous placement    Result Date: 1/16/2024  Interpreted By:  Elie Hussein, STUDY: FL INSERT TUNNELED LELE ANUPAM CATHETER WO PORT PUMP > 5 YEARS; US GUIDED PERCUTANEOUS PLACEMENT; 1/16/2024 11:55 am; 1/16/2024 11:38 am   INDICATION: Right foot, requiring extended term intravenous antibiotics; referred for insertion of a tunneled central venous catheter.   COMPARISON: None   ACCESSION NUMBER(S): [id number]; [id number]   ORDERING CLINICIAN: SOHA DELUNA   TECHNIQUE: Ultrasound-guided vascular access Fluoroscopy guided insertion of tunneled central venous catheter without port Fluoroscopy time:2 seconds Images: 1 Dose: 0.33 mGy air kerma   FINDINGS: Informed consent obtained. Patient positioned supine and connected to physiologic monitoring.  All elements of maximal sterile barrier were utilized including cap, mask, sterile gown, sterile gloves, large sterile drape, hand scrub, 2% chlorhexidine for skin cleaning and sterile ultrasound guidance. Ultrasound of right neck demonstrated patent internal jugular vein. Under ultrasound guidance, access into the vein was established with micro puncture and permanent image archived. Small dermatotomy made few cm below clavicle. A 5 [place] single lumen cuffed hemodialysis catheter (Palindrome) was tunneled from dermatotomy to venotomy. Introducer sheath exchanged for a peel-away sheath. Distal end of the tunneled catheter advanced centrally through the peel-away sheath. Fluoroscopy  confirmed tip of catheter at lower SVC. The catheter aspirated and flushed freely, secured, and covered with dressing. Patient tolerated procedure well.       Ultrasound and fluoroscopy guided insertion of tunneled central venous catheter.     Signed by: Elie Hussein 1/16/2024 12:44 PM Dictation workstation:   [id number]     Scheduled medications  amLODIPine, 10 mg, oral, Daily  aspirin, 81 mg, oral, Daily  atorvastatin, 40 mg, oral, Nightly  cefTRIAXone, 2 g, intravenous, q24h  cholecalciferol, 2,000 Units, oral, Daily  dicyclomine, 10 mg, oral, 4x daily  estradiol, 2 g, vaginal, Nightly  gabapentin, 300 mg, oral, BID  heparin, 5,000 Units, subcutaneous, q8h  insulin glargine, 45 Units, subcutaneous, Nightly  insulin lispro, 0-15 Units, subcutaneous, TID with meals  insulin lispro, 5 Units, subcutaneous, TID with meals  lidocaine, 5 mL, infiltration, Once  [Held by provider] magnesium oxide, 400 mg, oral, Daily  multivitamin with minerals, 1 tablet, oral, Daily  pantoprazole, 40 mg, oral, Daily before breakfast  sennosides-docusate sodium, 2 tablet, oral, Nightly      Continuous medications  sodium bicarbonate 75 mEq, 80 mL/hr, Last Rate: 80 mL/hr (01/16/24 1250)      PRN medications  PRN medications: acetaminophen **OR** acetaminophen **OR** acetaminophen, alteplase, benzocaine-menthol, dextromethorphan-guaifenesin, dextrose 10 % in water (D10W), dextrose, glucagon, guaiFENesin, meclizine, ondansetron ODT, polyethylene glycol      ASSESSMENT:  Status post right hallux amputation, bone biopsy  Right foot ulceration infection Staph A  Hypertension  Hyperlipidemia  Obesity  Type 2 diabetes mellitus hyperglycemia  Dizziness resolved  Hypokalemia  Hypoalbuminemia  Normocytic anemia  Pyuria UTI Group B Strep  Acute kidney injury worsening  Diarrhea    PLAN:  Status post right hallux amputation, bone biopsy Friday 1/12/2024.  Post-operative care.  Weight bearing status - flat foot or heel weightbearing right foot with  surgical shoe per Podiatry.  As needed analgesics.  Tylenol.  1/10/2024 wound culture MSSA, Candida glabrata.  1/12/2024 intra-operative culture Group B Strep.  Urine culture Group B strep.  Blood cultures pending no growth.  IV antibiotics per Infectious disease.  Ceftriaxone 2 grams every 24 hours anticipate 6 weeks of therapy ending 2/21/2024.  Renal dosing.  Follow up wound cultures.  Renal function improving.  Nephrology following.  Intermittent abdominal discomfort, tender on examination, intermittent diarrhea, obtain KUB.  Follow up.  Symptom control  Bentyl.  Ppi.  Diet as tolerated.  I's and O's.  Monitor fluid volume status.  Continue to hold nephrotoxic medications.  Management per Nephrology.  Monitor renal function closely.  Repeat orthostatic blood pressures and monitor.  Amlodipine 10 milligrams p.o daily.  Monitor blood pressure.  Point-of-care glucose reviewed.  Continue Lantus 45 units at HS with snack + scheduled Lispro 5 units TID with meals + sliding scale insulin.  Adjust insulin as needed for glycemic control.  Glycemic control pertinent for wound healing.  Diabetes education inpatient.  PT/OT.  Activity per Podiatry.  Fall precautions.  Up with assistance only.  Supportive care.  Patient reassured.  DVT prophylaxis.  Heparin subcutaneous.  Case management following for discharge planning.  Discharge plan home with home health care.  Awaiting arrangements.  Discussed with patient, nursing and Dr. Travis.    Celeste Hill, TAL-CNP

## 2024-01-16 NOTE — NURSING NOTE
Assumed care. Patient report given. Patient is laying in bed. Bed alarm on, call bell within reach.

## 2024-01-16 NOTE — PROGRESS NOTES
Nuris Squires is a 58 y.o. female on day 4 of admission presenting with Right foot infection.    Spoke with nursing this am, hypertensive, advised to contact Nephrology.  See orders.    Subjective   Patient seen and examined.  Resting sitting up in the chair in no acute distress.  Awake alert oriented x 3.  No new complaints.  Dizziness when up with therapy.  Diarrhea improved.  No right foot pain.  No fevers, chills or sweats.    Objective     Physical Exam  Vitals reviewed.   Constitutional:       General: She is not in acute distress.     Appearance: She is obese. She is not ill-appearing or toxic-appearing.   HENT:      Head: Normocephalic and atraumatic.      Right Ear: Tympanic membrane normal.      Left Ear: Tympanic membrane normal.      Nose: Nose normal.      Mouth/Throat:      Mouth: Mucous membranes are moist.      Pharynx: Oropharynx is clear.   Eyes:      Extraocular Movements: Extraocular movements intact.      Conjunctiva/sclera: Conjunctivae normal.      Pupils: Pupils are equal, round, and reactive to light.   Cardiovascular:      Rate and Rhythm: Normal rate and regular rhythm.      Pulses: Normal pulses.      Heart sounds: Normal heart sounds.   Pulmonary:      Effort: Pulmonary effort is normal. No respiratory distress.      Breath sounds: Normal breath sounds. No wheezing, rhonchi or rales.      Comments: 2 liters nasal cannula, no home oxygen  Abdominal:      General: Bowel sounds are normal. There is no distension.      Palpations: Abdomen is soft.      Tenderness: There is no abdominal tenderness.   Genitourinary:     Comments: Deferred  Musculoskeletal:         General: Swelling present. No tenderness. Normal range of motion.      Cervical back: Normal range of motion and neck supple.      Comments: Right foot dressing clean dry intact, boot in place   Skin:     General: Skin is warm and dry.      Capillary Refill: Capillary refill takes less than 2 seconds.      Comments: Right foot  "dressing clean dry intact, boot in place   Neurological:      General: No focal deficit present.      Mental Status: She is alert and oriented to person, place, and time.   Psychiatric:         Mood and Affect: Mood normal.         Behavior: Behavior normal.       Last Recorded Vitals  Blood pressure 164/56, pulse 93, temperature 36.8 °C (98.2 °F), temperature source Oral, resp. rate 18, height 1.575 m (5' 2\"), weight 102 kg (225 lb), SpO2 100 %.    Intake/Output last 3 Shifts:  I/O last 3 completed shifts:  In: 960 (9.4 mL/kg) [P.O.:960]  Out: - (0 mL/kg)   Weight: 102.1 kg     Telemetry normal sinus rhythm rate 90's    Relevant Results  Results for orders placed or performed during the hospital encounter of 01/09/24 (from the past 24 hour(s))   Renal Function Panel   Result Value Ref Range    Glucose 99 65 - 99 mg/dL    Sodium 141 133 - 145 mmol/L    Potassium 3.4 3.4 - 5.1 mmol/L    Chloride 104 97 - 107 mmol/L    Bicarbonate 24 24 - 31 mmol/L    Urea Nitrogen 29 (H) 8 - 25 mg/dL    Creatinine 4.50 (H) 0.40 - 1.60 mg/dL    eGFR 11 (L) >60 mL/min/1.73m*2    Calcium 8.6 8.5 - 10.4 mg/dL    Phosphorus 4.6 (H) 2.5 - 4.5 mg/dL    Albumin 2.8 (L) 3.5 - 5.0 g/dL    Anion Gap 13 <=19 mmol/L   CBC and Auto Differential   Result Value Ref Range    WBC 8.1 4.4 - 11.3 x10*3/uL    nRBC 0.0 0.0 - 0.0 /100 WBCs    RBC 3.07 (L) 4.00 - 5.20 x10*6/uL    Hemoglobin 9.0 (L) 12.0 - 16.0 g/dL    Hematocrit 28.4 (L) 36.0 - 46.0 %    MCV 93 80 - 100 fL    MCH 29.3 26.0 - 34.0 pg    MCHC 31.7 (L) 32.0 - 36.0 g/dL    RDW 13.0 11.5 - 14.5 %    Platelets 445 150 - 450 x10*3/uL    Neutrophils % 54.3 40.0 - 80.0 %    Immature Granulocytes %, Automated 0.5 0.0 - 0.9 %    Lymphocytes % 31.2 13.0 - 44.0 %    Monocytes % 10.0 2.0 - 10.0 %    Eosinophils % 3.4 0.0 - 6.0 %    Basophils % 0.6 0.0 - 2.0 %    Neutrophils Absolute 4.41 1.20 - 7.70 x10*3/uL    Immature Granulocytes Absolute, Automated 0.04 0.00 - 0.70 x10*3/uL    Lymphocytes Absolute " 2.54 1.20 - 4.80 x10*3/uL    Monocytes Absolute 0.81 0.10 - 1.00 x10*3/uL    Eosinophils Absolute 0.28 0.00 - 0.70 x10*3/uL    Basophils Absolute 0.05 0.00 - 0.10 x10*3/uL   Iron and TIBC   Result Value Ref Range    Iron 48 30 - 160 ug/dL    UIBC 103 (L) 110 - 370 ug/dL    TIBC 151 (L) 228 - 428 ug/dL    % Saturation 32 12 - 50 %   Ferritin   Result Value Ref Range    Ferritin 487 (H) 13 - 150 ng/mL   POCT GLUCOSE   Result Value Ref Range    POCT Glucose 69 (L) 74 - 99 mg/dL   POCT GLUCOSE   Result Value Ref Range    POCT Glucose 263 (H) 74 - 99 mg/dL   POCT GLUCOSE   Result Value Ref Range    POCT Glucose 312 (H) 74 - 99 mg/dL   POCT GLUCOSE   Result Value Ref Range    POCT Glucose 188 (H) 74 - 99 mg/dL     Susceptibility data from last 90 days.  Collected Specimen Info Organism Clindamycin Erythromycin Oxacillin Tetracycline Trimethoprim/Sulfamethoxazole Vancomycin   01/12/24 Tissue from DIGIT FIRST, RIGHT FOOT Streptococcus agalactiae (Group B Streptococcus)           Mixed Gram-Positive Bacteria          01/10/24 Tissue/Biopsy from Diabetic Foot Ulcer Methicillin Susceptible Staphylococcus aureus (MSSA) R R S S S S     Candida glabrata           Mixed Gram-Positive Bacteria          01/09/24 Urine from Clean Catch/Voided Streptococcus agalactiae (Group B Streptococcus)           No results found.    Scheduled medications  amLODIPine, 10 mg, oral, Daily  aspirin, 81 mg, oral, Daily  atorvastatin, 40 mg, oral, Nightly  cefTRIAXone, 2 g, intravenous, q24h  cholecalciferol, 2,000 Units, oral, Daily  dicyclomine, 10 mg, oral, 4x daily  estradiol, 2 g, vaginal, Nightly  gabapentin, 300 mg, oral, BID  heparin, 5,000 Units, subcutaneous, q8h  insulin glargine, 45 Units, subcutaneous, Nightly  insulin lispro, 0-15 Units, subcutaneous, TID with meals  insulin lispro, 5 Units, subcutaneous, TID with meals  lidocaine, 5 mL, infiltration, Once  [Held by provider] magnesium oxide, 400 mg, oral, Daily  multivitamin with  minerals, 1 tablet, oral, Daily  pantoprazole, 40 mg, oral, Daily before breakfast  sennosides-docusate sodium, 2 tablet, oral, Nightly      Continuous medications  sodium bicarbonate 75 mEq, 80 mL/hr, Last Rate: 80 mL/hr (01/14/24 1813)      PRN medications  PRN medications: acetaminophen **OR** acetaminophen **OR** acetaminophen, alteplase, benzocaine-menthol, dextromethorphan-guaifenesin, dextrose 10 % in water (D10W), dextrose, glucagon, guaiFENesin, meclizine, ondansetron ODT, polyethylene glycol    ASSESSMENT:  Status post right hallux amputation, bone biopsy  Right foot ulceration infection Staph A  Hypertension  Hyperlipidemia  Obesity  Type 2 diabetes mellitus hyperglycemia  Dizziness resolved  Hypokalemia  Hypoalbuminemia  Normocytic anemia  Pyuria UTI Group B Strep  Acute kidney injury worsening  Diarrhea    PLAN:  Status post right hallux amputation, bone biopsy Friday 1/12/2024.  Post-operative care.  Weight bearing status - flat foot or heel weightbearing right foot with surgical shoe per Podiatry.  As needed analgesics.  1/10/2024 wound culture MSSA, Candida glabrata.  1/12/2024 intra-operative culture Group B Strep.  Urine culture Group B strep.  Blood cultures pending no growth.  IV antibiotics per Infectious disease.  Renal dosing.  Follow up wound, blood cultures.  Reviewed.  Renal function improved.  Nephrology following, input appreciated.  Renal ultrasound reviewed.  Limited, grossly unremarkable.  Diarrhea improved I's and O's.  Monitor fluid volume status.  Continue to hold nephrotoxic medications.  IV fluids per Nephrology.  Monitor renal function closely.  Dizziness when up.  Hypertensive.  Management per Nephrology's recommendations.  Amlodipine 10 milligrams p.o daily.  Monitor blood pressure.  Repeat orthostatic vital signs signs.  Discussed with nursing.  Point-of-care glucose reviewed.  Continue Lantus 45 units at HS + scheduled Lispro 5 units TID with meals + sliding scale insulin.   Adjust insulin as needed for glycemic control.  Glycemic control pertinent for wound healing.  Diabetes education inpatient.  PT/OT.  Activity per Podiatry.  Fall precautions.  Up with assistance only.  Supportive care.  Patient reassured.  DVT prophylaxis.  Heparin subcutaneous.  Case management following for discharge planning.  Discussed with patient, nursing and Dr. Travis.    Celeste Hill, APRN-CNP

## 2024-01-16 NOTE — CARE PLAN
The patient's goals for the shift include Safety    The clinical goals for the shift include Pt's will not have hypoglucemic event today.

## 2024-01-17 ENCOUNTER — HOME HEALTH ADMISSION (OUTPATIENT)
Dept: HOME HEALTH SERVICES | Facility: HOME HEALTH | Age: 59
End: 2024-01-17
Payer: COMMERCIAL

## 2024-01-17 LAB
ANION GAP SERPL CALC-SCNC: 10 MMOL/L
BUN SERPL-MCNC: 27 MG/DL (ref 8–25)
CALCIUM SERPL-MCNC: 8.4 MG/DL (ref 8.5–10.4)
CHLORIDE SERPL-SCNC: 106 MMOL/L (ref 97–107)
CO2 SERPL-SCNC: 25 MMOL/L (ref 24–31)
CREAT SERPL-MCNC: 2.5 MG/DL (ref 0.4–1.6)
EGFRCR SERPLBLD CKD-EPI 2021: 22 ML/MIN/1.73M*2
ERYTHROCYTE [DISTWIDTH] IN BLOOD BY AUTOMATED COUNT: 13.1 % (ref 11.5–14.5)
GLUCOSE BLD MANUAL STRIP-MCNC: 141 MG/DL (ref 74–99)
GLUCOSE BLD MANUAL STRIP-MCNC: 156 MG/DL (ref 74–99)
GLUCOSE BLD MANUAL STRIP-MCNC: 193 MG/DL (ref 74–99)
GLUCOSE BLD MANUAL STRIP-MCNC: 217 MG/DL (ref 74–99)
GLUCOSE SERPL-MCNC: 192 MG/DL (ref 65–99)
HCT VFR BLD AUTO: 25.7 % (ref 36–46)
HGB BLD-MCNC: 8.1 G/DL (ref 12–16)
MCH RBC QN AUTO: 29.3 PG (ref 26–34)
MCHC RBC AUTO-ENTMCNC: 31.5 G/DL (ref 32–36)
MCV RBC AUTO: 93 FL (ref 80–100)
NRBC BLD-RTO: 0 /100 WBCS (ref 0–0)
PLATELET # BLD AUTO: 427 X10*3/UL (ref 150–450)
POTASSIUM SERPL-SCNC: 4.1 MMOL/L (ref 3.4–5.1)
RBC # BLD AUTO: 2.76 X10*6/UL (ref 4–5.2)
SODIUM SERPL-SCNC: 141 MMOL/L (ref 133–145)
WBC # BLD AUTO: 8.7 X10*3/UL (ref 4.4–11.3)

## 2024-01-17 PROCEDURE — 2500000001 HC RX 250 WO HCPCS SELF ADMINISTERED DRUGS (ALT 637 FOR MEDICARE OP): Performed by: INTERNAL MEDICINE

## 2024-01-17 PROCEDURE — 97110 THERAPEUTIC EXERCISES: CPT | Mod: GO,CO

## 2024-01-17 PROCEDURE — 82947 ASSAY GLUCOSE BLOOD QUANT: CPT

## 2024-01-17 PROCEDURE — 2500000004 HC RX 250 GENERAL PHARMACY W/ HCPCS (ALT 636 FOR OP/ED): Performed by: STUDENT IN AN ORGANIZED HEALTH CARE EDUCATION/TRAINING PROGRAM

## 2024-01-17 PROCEDURE — 1200000002 HC GENERAL ROOM WITH TELEMETRY DAILY

## 2024-01-17 PROCEDURE — 2500000004 HC RX 250 GENERAL PHARMACY W/ HCPCS (ALT 636 FOR OP/ED): Performed by: INTERNAL MEDICINE

## 2024-01-17 PROCEDURE — 97110 THERAPEUTIC EXERCISES: CPT | Mod: GP,CQ

## 2024-01-17 PROCEDURE — 2500000001 HC RX 250 WO HCPCS SELF ADMINISTERED DRUGS (ALT 637 FOR MEDICARE OP): Performed by: STUDENT IN AN ORGANIZED HEALTH CARE EDUCATION/TRAINING PROGRAM

## 2024-01-17 PROCEDURE — 2500000004 HC RX 250 GENERAL PHARMACY W/ HCPCS (ALT 636 FOR OP/ED): Performed by: NURSE PRACTITIONER

## 2024-01-17 PROCEDURE — 97116 GAIT TRAINING THERAPY: CPT | Mod: GP,CQ

## 2024-01-17 PROCEDURE — 80048 BASIC METABOLIC PNL TOTAL CA: CPT | Performed by: INTERNAL MEDICINE

## 2024-01-17 PROCEDURE — 2500000002 HC RX 250 W HCPCS SELF ADMINISTERED DRUGS (ALT 637 FOR MEDICARE OP, ALT 636 FOR OP/ED): Performed by: INTERNAL MEDICINE

## 2024-01-17 PROCEDURE — 85027 COMPLETE CBC AUTOMATED: CPT | Performed by: NURSE PRACTITIONER

## 2024-01-17 RX ADMIN — CEFTRIAXONE SODIUM 2 G: 2 INJECTION, SOLUTION INTRAVENOUS at 16:44

## 2024-01-17 RX ADMIN — INSULIN LISPRO 5 UNITS: 100 INJECTION, SOLUTION INTRAVENOUS; SUBCUTANEOUS at 12:21

## 2024-01-17 RX ADMIN — ASPIRIN 81 MG: 81 TABLET, CHEWABLE ORAL at 08:21

## 2024-01-17 RX ADMIN — HEPARIN SODIUM 5000 UNITS: 5000 INJECTION, SOLUTION INTRAVENOUS; SUBCUTANEOUS at 14:18

## 2024-01-17 RX ADMIN — INSULIN LISPRO 3 UNITS: 100 INJECTION, SOLUTION INTRAVENOUS; SUBCUTANEOUS at 08:23

## 2024-01-17 RX ADMIN — INSULIN LISPRO 3 UNITS: 100 INJECTION, SOLUTION INTRAVENOUS; SUBCUTANEOUS at 12:18

## 2024-01-17 RX ADMIN — INSULIN LISPRO 5 UNITS: 100 INJECTION, SOLUTION INTRAVENOUS; SUBCUTANEOUS at 16:47

## 2024-01-17 RX ADMIN — SODIUM BICARBONATE 80 ML/HR: 84 INJECTION, SOLUTION INTRAVENOUS at 23:43

## 2024-01-17 RX ADMIN — DICYCLOMINE HYDROCHLORIDE 10 MG: 10 CAPSULE ORAL at 20:29

## 2024-01-17 RX ADMIN — DICYCLOMINE HYDROCHLORIDE 10 MG: 10 CAPSULE ORAL at 06:01

## 2024-01-17 RX ADMIN — HEPARIN SODIUM 5000 UNITS: 5000 INJECTION, SOLUTION INTRAVENOUS; SUBCUTANEOUS at 21:59

## 2024-01-17 RX ADMIN — GABAPENTIN 300 MG: 300 CAPSULE ORAL at 08:21

## 2024-01-17 RX ADMIN — INSULIN GLARGINE 45 UNITS: 100 INJECTION, SOLUTION SUBCUTANEOUS at 20:30

## 2024-01-17 RX ADMIN — Medication 2000 UNITS: at 06:01

## 2024-01-17 RX ADMIN — GABAPENTIN 300 MG: 300 CAPSULE ORAL at 20:30

## 2024-01-17 RX ADMIN — PANTOPRAZOLE SODIUM 40 MG: 40 TABLET, DELAYED RELEASE ORAL at 06:01

## 2024-01-17 RX ADMIN — INSULIN LISPRO 5 UNITS: 100 INJECTION, SOLUTION INTRAVENOUS; SUBCUTANEOUS at 08:22

## 2024-01-17 RX ADMIN — Medication 1 TABLET: at 09:00

## 2024-01-17 RX ADMIN — DICYCLOMINE HYDROCHLORIDE 10 MG: 10 CAPSULE ORAL at 12:23

## 2024-01-17 RX ADMIN — AMLODIPINE BESYLATE 10 MG: 10 TABLET ORAL at 08:21

## 2024-01-17 RX ADMIN — HEPARIN SODIUM 5000 UNITS: 5000 INJECTION, SOLUTION INTRAVENOUS; SUBCUTANEOUS at 06:02

## 2024-01-17 RX ADMIN — ATORVASTATIN CALCIUM 40 MG: 40 TABLET, FILM COATED ORAL at 20:29

## 2024-01-17 RX ADMIN — SODIUM BICARBONATE 80 ML/HR: 84 INJECTION, SOLUTION INTRAVENOUS at 02:27

## 2024-01-17 RX ADMIN — DICYCLOMINE HYDROCHLORIDE 10 MG: 10 CAPSULE ORAL at 16:44

## 2024-01-17 RX ADMIN — INSULIN LISPRO 6 UNITS: 100 INJECTION, SOLUTION INTRAVENOUS; SUBCUTANEOUS at 16:45

## 2024-01-17 ASSESSMENT — COGNITIVE AND FUNCTIONAL STATUS - GENERAL
MOBILITY SCORE: 17
MOVING FROM LYING ON BACK TO SITTING ON SIDE OF FLAT BED WITH BEDRAILS: A LITTLE
DRESSING REGULAR LOWER BODY CLOTHING: A LITTLE
CLIMB 3 TO 5 STEPS WITH RAILING: A LOT
DAILY ACTIVITIY SCORE: 19
MOVING FROM LYING ON BACK TO SITTING ON SIDE OF FLAT BED WITH BEDRAILS: A LITTLE
STANDING UP FROM CHAIR USING ARMS: A LITTLE
PERSONAL GROOMING: A LITTLE
TOILETING: A LITTLE
CLIMB 3 TO 5 STEPS WITH RAILING: A LOT
MOBILITY SCORE: 17
DAILY ACTIVITIY SCORE: 20
PERSONAL GROOMING: A LITTLE
TOILETING: A LITTLE
WALKING IN HOSPITAL ROOM: A LITTLE
HELP NEEDED FOR BATHING: A LITTLE
MOVING TO AND FROM BED TO CHAIR: A LITTLE
TURNING FROM BACK TO SIDE WHILE IN FLAT BAD: A LITTLE
DRESSING REGULAR LOWER BODY CLOTHING: A LITTLE
STANDING UP FROM CHAIR USING ARMS: A LITTLE
TURNING FROM BACK TO SIDE WHILE IN FLAT BAD: A LITTLE
MOVING TO AND FROM BED TO CHAIR: A LITTLE
DRESSING REGULAR UPPER BODY CLOTHING: A LITTLE
HELP NEEDED FOR BATHING: A LITTLE
WALKING IN HOSPITAL ROOM: A LITTLE

## 2024-01-17 ASSESSMENT — PAIN - FUNCTIONAL ASSESSMENT
PAIN_FUNCTIONAL_ASSESSMENT: 0-10

## 2024-01-17 ASSESSMENT — PAIN SCALES - GENERAL
PAINLEVEL_OUTOF10: 0 - NO PAIN

## 2024-01-17 NOTE — CARE PLAN
The patient's goals for the shift include Safety    The clinical goals for the shift include safety

## 2024-01-17 NOTE — NURSING NOTE
Patient with Rt chest tunneled line, flowsheet charted as picc line, Rt chest dressing bloody, dressing change done using sterile technique, ext catheter at 2 cm, not sutured, securement device applied, blue cap changed, flushes easily and with positive blood return, clamped and curos cap applied.   no

## 2024-01-17 NOTE — PROGRESS NOTES
Occupational Therapy    OT Treatment    Patient Name: Nuris Squires  MRN: [id number]  Today's Date: 1/17/2024  Time Calculation  Start Time: 1430  Stop Time: 1440  Time Calculation (min): 10 min         Assessment:  OT Assessment: pt tolerated session well, progressing towards POC. Pt stating she requires no home going OT needs.  End of Session Communication: Bedside nurse  End of Session Patient Position: Bed, 2 rail up (seated EOB)  OT Assessment Results: Decreased upper extremity strength, Decreased endurance, Decreased functional mobility  Plan:  Treatment Interventions: ADL retraining, Functional transfer training, Endurance training, Patient/family training, Equipment evaluation/education  OT Frequency: 5 times per week  OT Discharge Recommendations: Low intensity level of continued care  OT Recommended Transfer Status: Assist of 1  OT - OK to Discharge: Yes  Treatment Interventions: ADL retraining, Functional transfer training, Endurance training, Patient/family training, Equipment evaluation/education    Subjective   Previous Visit Info:  OT Last Visit  OT Received On: 01/17/24  General:  General  Prior to Session Communication: Bedside nurse  Patient Position Received: Bed, 2 rail up (seated EOB)  General Comment: cleared for therapy per RN. Pt seated EOB upon arrival, agreeable to tx session.  Precautions:  LE Weight Bearing Status: Heel Weight Bearing in Post-Op Shoe  Medical Precautions: Fall precautions    Pain:  Pain Assessment  Pain Assessment: 0-10  Pain Score: 0 - No pain    Objective      Bed Mobility/Transfers:    Transfers  Transfer: Yes  Transfer 1  Transfer From 1: Sit to  Transfer to 1: Stand  Technique 1: Sit to stand, Stand to sit  Transfer Level of Assistance 1: Close supervision  Trials/Comments 1: VC for hand placement    Toilet Transfers  Toilet Transfer From: Walker  Toilet Transfer Type: To and from  Toilet Transfer to: Standard toilet  Toilet Transfers Comments: VC for use of grab bar  forUE support       Therapy/Activity: Therapeutic Exercise  Therapeutic Exercise Activity 1: participated in AROM BUE exercises 4x12 in all planes to improve strength to increase independence with ADL/IADL tasks    Therapeutic Activity  Therapeutic Activity 1: participated in functional mobility a short household distance at [facility] with S, demonstrating good balance      Outcome Measures:[facility] Daily Activity  Putting on and taking off regular lower body clothing: A little  Bathing (including washing, rinsing, drying): A little  Putting on and taking off regular upper body clothing: None  Toileting, which includes using toilet, bedpan or urinal: A little  Taking care of personal grooming such as brushing teeth: A little  Eating Meals: None  Daily Activity - Total Score: 20        Education Documentation  ADL Training, taught by RANDY Sharma at 1/17/2024  3:04 PM.  Learner: Patient  Readiness: Acceptance  Method: Explanation  Response: Verbalizes Understanding    Education Comments  No comments found.      Problem: ADL  Goal: Goal 1  Description: Patient will demonstrate improved ADL skills:  Bathing with Supervision assist with adaptive equipment/DME   Grooming with Supervision       UE Dressing with Supervision          LE Dressing with Supervision assist with adaptive equipment/DME       Toileting with Supervision assist with adaptive equipment/DME       1/17/2024 1534 by RANDY Sharma  Outcome: Met

## 2024-01-17 NOTE — PROGRESS NOTES
"Nuris Squires is a 58 y.o. female on day 6 of admission presenting with Right foot infection.    Subjective   Patient seen for acute kidney injury admitted with osteomyelitis and infection of foot status post debridement clinically she feels well she has no complaints at all and no overnight events noted       Objective     Physical Exam  Neck:      Vascular: No carotid bruit.   Cardiovascular:      Rate and Rhythm: Normal rate and regular rhythm.      Heart sounds: No murmur heard.     No friction rub. No gallop.   Pulmonary:      Breath sounds: No wheezing, rhonchi or rales.   Chest:      Chest wall: No tenderness.   Abdominal:      General: There is no distension.      Tenderness: There is no abdominal tenderness. There is no guarding or rebound.   Musculoskeletal:         General: No swelling or tenderness.      Cervical back: Neck supple.      Right lower leg: No edema.      Left lower leg: No edema.      Comments: Right foot surgery and dressing   Lymphadenopathy:      Cervical: No cervical adenopathy.         Last Recorded Vitals  Blood pressure 154/84, pulse 81, temperature 36.5 °C (97.7 °F), temperature source Oral, resp. rate 18, height 1.575 m (5' 2\"), weight 102 kg (225 lb), SpO2 93 %.    Intake/Output last 3 Shifts:  I/O last 3 completed shifts:  In: 3796 (37.2 mL/kg) [P.O.:1050; IV Piggyback:2746]  Out: - (0 mL/kg)   Weight: 102.1 kg     Current Facility-Administered Medications:     acetaminophen (Tylenol) tablet 650 mg, 650 mg, oral, q4h PRN **OR** acetaminophen (Tylenol) oral liquid 650 mg, 650 mg, oral, q4h PRN **OR** acetaminophen (Tylenol) suppository 650 mg, 650 mg, rectal, q4h PRN, Bk Whittington DPM    alteplase (Cathflo Activase) injection 2 mg, 2 mg, intra-catheter, PRN, Lucho Hickey MD    amLODIPine (Norvasc) tablet 10 mg, 10 mg, oral, Daily, Brandt Leon MD, 10 mg at 01/17/24 0821    aspirin chewable tablet 81 mg, 81 mg, oral, Daily, Bk Whittington DPM, 81 mg at 01/17/24 0821    " atorvastatin (Lipitor) tablet 40 mg, 40 mg, oral, Nightly, Bk Whittington DPM, 40 mg at 01/16/24 2218    benzocaine-menthol (Cepastat Sore Throat) 15-3.6 mg lozenge 1 lozenge, 1 lozenge, Mouth/Throat, q2h PRN, Bk Whittington DPM    cefTRIAXone (Rocephin) 2 g IV in dextrose 5% 50 mL, 2 g, intravenous, q24h, Lucho Hickey MD, Stopped at 01/16/24 1713    cholecalciferol (Vitamin D-3) tablet 2,000 Units, 2,000 Units, oral, Daily, Bk Whittington DPM, 2,000 Units at 01/17/24 0601    dextromethorphan-guaifenesin (Robitussin DM)  mg/5 mL oral liquid 5 mL, 5 mL, oral, q4h PRN, Bk Whittington DPM    dextrose 10 % in water (D10W) infusion, 0.3 g/kg/hr, intravenous, Once PRN, Bk Whittington DPM    dextrose 50 % injection 25 g, 25 g, intravenous, q15 min PRN, Bk Whittington DPM    dicyclomine (Bentyl) capsule 10 mg, 10 mg, oral, 4x daily, Bk Whittington DPM, 10 mg at 01/17/24 0601    estradiol (Estrace) 0.01 % (0.1 mg/gram) vaginal cream 2 g, 2 g, vaginal, Nightly, Bk Whittington DPM    gabapentin (Neurontin) capsule 300 mg, 300 mg, oral, BID, Bk Whittington DPM, 300 mg at 01/17/24 0821    glucagon (Glucagen) injection 1 mg, 1 mg, intramuscular, q15 min PRN, Bk Whittington DPM    guaiFENesin (Mucinex) 12 hr tablet 600 mg, 600 mg, oral, q12h PRN, Bk Whittington DPM    heparin (porcine) injection 5,000 Units, 5,000 Units, subcutaneous, q8h, Celeste Hill, APRN-CNP, 5,000 Units at 01/17/24 0602    insulin glargine (Lantus) injection 45 Units, 45 Units, subcutaneous, Nightly, Dariel Travis MD, 45 Units at 01/16/24 2219    insulin lispro (HumaLOG) injection 0-15 Units, 0-15 Units, subcutaneous, TID with meals, Bk Whittington DPM, 3 Units at 01/17/24 0823    insulin lispro (HumaLOG) injection 5 Units, 5 Units, subcutaneous, TID with meals, Dariel Travis MD, 5 Units at 01/17/24 0822    lidocaine (Xylocaine) 10 mg/mL (1 %) injection 50 mg, 5 mL, infiltration, Once, Lucho Hickey,  MD    [Held by provider] magnesium oxide (Mag-Ox) tablet 400 mg, 400 mg, oral, Daily, Bk Whittington DPM, 400 mg at 01/12/24 0719    meclizine (Antivert) tablet 25 mg, 25 mg, oral, TID PRN, Bk Whittington DPM, 25 mg at 01/15/24 1511    multivitamin with minerals 1 tablet, 1 tablet, oral, Daily, Bk Whittington DPM, 1 tablet at 01/17/24 0900    ondansetron ODT (Zofran-ODT) disintegrating tablet 4 mg, 4 mg, oral, q8h PRN, Bk Whittington DPM    pantoprazole (ProtoNix) EC tablet 40 mg, 40 mg, oral, Daily before breakfast, Bk Whittington DPM, 40 mg at 01/17/24 0601    polyethylene glycol (Glycolax, Miralax) packet 17 g, 17 g, oral, Daily PRN, Bk Whittington DPM    sennosides-docusate sodium (Doris-Colace) 8.6-50 mg per tablet 2 tablet, 2 tablet, oral, Nightly, Bk Whittington DPM, 2 tablet at 01/12/24 2138    sodium bicarbonate 75 mEq in 1000 mL sodium chloride 0.45% solution, 80 mL/hr, intravenous, Continuous, Bk Whittington DPM, Last Rate: 80 mL/hr at 01/17/24 0227, 80 mL/hr at 01/17/24 0227   Relevant Results    Results for orders placed or performed during the hospital encounter of 01/09/24 (from the past 96 hour(s))   POCT GLUCOSE   Result Value Ref Range    POCT Glucose 124 (H) 74 - 99 mg/dL   POCT GLUCOSE   Result Value Ref Range    POCT Glucose 159 (H) 74 - 99 mg/dL   Renal Function Panel   Result Value Ref Range    Glucose 181 (H) 65 - 99 mg/dL    Sodium 137 133 - 145 mmol/L    Potassium 3.5 3.4 - 5.1 mmol/L    Chloride 102 97 - 107 mmol/L    Bicarbonate 20 (L) 24 - 31 mmol/L    Urea Nitrogen 33 (H) 8 - 25 mg/dL    Creatinine 5.30 (H) 0.40 - 1.60 mg/dL    eGFR 9 (L) >60 mL/min/1.73m*2    Calcium 8.5 8.5 - 10.4 mg/dL    Phosphorus 5.1 (H) 2.5 - 4.5 mg/dL    Albumin 2.6 (L) 3.5 - 5.0 g/dL    Anion Gap 15 <=19 mmol/L   CBC and Auto Differential   Result Value Ref Range    WBC 8.3 4.4 - 11.3 x10*3/uL    nRBC 0.0 0.0 - 0.0 /100 WBCs    RBC 3.15 (L) 4.00 - 5.20 x10*6/uL    Hemoglobin 9.3 (L) 12.0  - 16.0 g/dL    Hematocrit 28.1 (L) 36.0 - 46.0 %    MCV 89 80 - 100 fL    MCH 29.5 26.0 - 34.0 pg    MCHC 33.1 32.0 - 36.0 g/dL    RDW 12.6 11.5 - 14.5 %    Platelets 427 150 - 450 x10*3/uL    Neutrophils % 66.8 40.0 - 80.0 %    Immature Granulocytes %, Automated 0.4 0.0 - 0.9 %    Lymphocytes % 20.9 13.0 - 44.0 %    Monocytes % 8.3 2.0 - 10.0 %    Eosinophils % 3.0 0.0 - 6.0 %    Basophils % 0.6 0.0 - 2.0 %    Neutrophils Absolute 5.54 1.20 - 7.70 x10*3/uL    Immature Granulocytes Absolute, Automated 0.03 0.00 - 0.70 x10*3/uL    Lymphocytes Absolute 1.73 1.20 - 4.80 x10*3/uL    Monocytes Absolute 0.69 0.10 - 1.00 x10*3/uL    Eosinophils Absolute 0.25 0.00 - 0.70 x10*3/uL    Basophils Absolute 0.05 0.00 - 0.10 x10*3/uL   POCT GLUCOSE   Result Value Ref Range    POCT Glucose 133 (H) 74 - 99 mg/dL   POCT GLUCOSE   Result Value Ref Range    POCT Glucose 188 (H) 74 - 99 mg/dL   POCT GLUCOSE   Result Value Ref Range    POCT Glucose 167 (H) 74 - 99 mg/dL   POCT GLUCOSE   Result Value Ref Range    POCT Glucose 216 (H) 74 - 99 mg/dL   Renal Function Panel   Result Value Ref Range    Glucose 99 65 - 99 mg/dL    Sodium 141 133 - 145 mmol/L    Potassium 3.4 3.4 - 5.1 mmol/L    Chloride 104 97 - 107 mmol/L    Bicarbonate 24 24 - 31 mmol/L    Urea Nitrogen 29 (H) 8 - 25 mg/dL    Creatinine 4.50 (H) 0.40 - 1.60 mg/dL    eGFR 11 (L) >60 mL/min/1.73m*2    Calcium 8.6 8.5 - 10.4 mg/dL    Phosphorus 4.6 (H) 2.5 - 4.5 mg/dL    Albumin 2.8 (L) 3.5 - 5.0 g/dL    Anion Gap 13 <=19 mmol/L   CBC and Auto Differential   Result Value Ref Range    WBC 8.1 4.4 - 11.3 x10*3/uL    nRBC 0.0 0.0 - 0.0 /100 WBCs    RBC 3.07 (L) 4.00 - 5.20 x10*6/uL    Hemoglobin 9.0 (L) 12.0 - 16.0 g/dL    Hematocrit 28.4 (L) 36.0 - 46.0 %    MCV 93 80 - 100 fL    MCH 29.3 26.0 - 34.0 pg    MCHC 31.7 (L) 32.0 - 36.0 g/dL    RDW 13.0 11.5 - 14.5 %    Platelets 445 150 - 450 x10*3/uL    Neutrophils % 54.3 40.0 - 80.0 %    Immature Granulocytes %, Automated 0.5 0.0 -  0.9 %    Lymphocytes % 31.2 13.0 - 44.0 %    Monocytes % 10.0 2.0 - 10.0 %    Eosinophils % 3.4 0.0 - 6.0 %    Basophils % 0.6 0.0 - 2.0 %    Neutrophils Absolute 4.41 1.20 - 7.70 x10*3/uL    Immature Granulocytes Absolute, Automated 0.04 0.00 - 0.70 x10*3/uL    Lymphocytes Absolute 2.54 1.20 - 4.80 x10*3/uL    Monocytes Absolute 0.81 0.10 - 1.00 x10*3/uL    Eosinophils Absolute 0.28 0.00 - 0.70 x10*3/uL    Basophils Absolute 0.05 0.00 - 0.10 x10*3/uL   Iron and TIBC   Result Value Ref Range    Iron 48 30 - 160 ug/dL    UIBC 103 (L) 110 - 370 ug/dL    TIBC 151 (L) 228 - 428 ug/dL    % Saturation 32 12 - 50 %   Ferritin   Result Value Ref Range    Ferritin 487 (H) 13 - 150 ng/mL   POCT GLUCOSE   Result Value Ref Range    POCT Glucose 69 (L) 74 - 99 mg/dL   POCT GLUCOSE   Result Value Ref Range    POCT Glucose 263 (H) 74 - 99 mg/dL   POCT GLUCOSE   Result Value Ref Range    POCT Glucose 312 (H) 74 - 99 mg/dL   POCT GLUCOSE   Result Value Ref Range    POCT Glucose 188 (H) 74 - 99 mg/dL   POCT GLUCOSE   Result Value Ref Range    POCT Glucose 76 74 - 99 mg/dL   Basic Metabolic Panel   Result Value Ref Range    Glucose 144 (H) 65 - 99 mg/dL    Sodium 143 133 - 145 mmol/L    Potassium 3.6 3.4 - 5.1 mmol/L    Chloride 106 97 - 107 mmol/L    Bicarbonate 25 24 - 31 mmol/L    Urea Nitrogen 25 8 - 25 mg/dL    Creatinine 3.10 (H) 0.40 - 1.60 mg/dL    eGFR 17 (L) >60 mL/min/1.73m*2    Calcium 8.7 8.5 - 10.4 mg/dL    Anion Gap 12 <=19 mmol/L   CBC   Result Value Ref Range    WBC 7.5 4.4 - 11.3 x10*3/uL    nRBC 0.0 0.0 - 0.0 /100 WBCs    RBC 3.00 (L) 4.00 - 5.20 x10*6/uL    Hemoglobin 8.9 (L) 12.0 - 16.0 g/dL    Hematocrit 26.5 (L) 36.0 - 46.0 %    MCV 88 80 - 100 fL    MCH 29.7 26.0 - 34.0 pg    MCHC 33.6 32.0 - 36.0 g/dL    RDW 13.0 11.5 - 14.5 %    Platelets 459 (H) 150 - 450 x10*3/uL   POCT GLUCOSE   Result Value Ref Range    POCT Glucose 139 (H) 74 - 99 mg/dL   POCT GLUCOSE   Result Value Ref Range    POCT Glucose 123 (H) 74 -  99 mg/dL   POCT GLUCOSE   Result Value Ref Range    POCT Glucose 264 (H) 74 - 99 mg/dL   POCT GLUCOSE   Result Value Ref Range    POCT Glucose 181 (H) 74 - 99 mg/dL   CBC   Result Value Ref Range    WBC 8.7 4.4 - 11.3 x10*3/uL    nRBC 0.0 0.0 - 0.0 /100 WBCs    RBC 2.76 (L) 4.00 - 5.20 x10*6/uL    Hemoglobin 8.1 (L) 12.0 - 16.0 g/dL    Hematocrit 25.7 (L) 36.0 - 46.0 %    MCV 93 80 - 100 fL    MCH 29.3 26.0 - 34.0 pg    MCHC 31.5 (L) 32.0 - 36.0 g/dL    RDW 13.1 11.5 - 14.5 %    Platelets 427 150 - 450 x10*3/uL   Basic Metabolic Panel   Result Value Ref Range    Glucose 192 (H) 65 - 99 mg/dL    Sodium 141 133 - 145 mmol/L    Potassium 4.1 3.4 - 5.1 mmol/L    Chloride 106 97 - 107 mmol/L    Bicarbonate 25 24 - 31 mmol/L    Urea Nitrogen 27 (H) 8 - 25 mg/dL    Creatinine 2.50 (H) 0.40 - 1.60 mg/dL    eGFR 22 (L) >60 mL/min/1.73m*2    Calcium 8.4 (L) 8.5 - 10.4 mg/dL    Anion Gap 10 <=19 mmol/L   POCT GLUCOSE   Result Value Ref Range    POCT Glucose 156 (H) 74 - 99 mg/dL       Assessment/Plan   1.  Acute kidney injury she is improving significantly creatinine is down in the 2 range and I plan to continue to monitor no indication for dialysis good to discharge from renal point of view here with other consultants    2.  Chronic kidney disease stage III  - baseline Cr 1.2-1.5  3.  Cellulitis of the foot continue antibiotics per ID  4.  Diabetes mellitus               Brandt Leon MD

## 2024-01-17 NOTE — PROGRESS NOTES
Anticipate discharge soon. Patient will return home with [facility].  Infusion still waiting on insurance verification of benefits. Patient cannot discharge until benefits and SOC are received. Will continue to follow.     **PATIENT DOES NOT HAVE A SAFE DISCHARGE PLAN      Cesia Garcia RN

## 2024-01-17 NOTE — PROGRESS NOTES
INFECTIOUS DISEASES PROGRESS NOTE    Consulted / following patient for:  Right foot infection    Subjective   Interval History:   No specific complaints.  Understands the need for prolonged antibiotic therapy    Objective   PHYSICAL EXAMINATION  Vital signs:  Visit Vitals  BP (!) 150/97 (BP Location: Left arm, Patient Position: Lying) Comment: RN notified   Pulse 80   Temp 36.7 °C (98.1 °F) (Oral)   Resp 18      General: No acute distress, not toxic  Extremities: Dressing not removed, right lower extremity.    Chest:  Serena catheter in the right chest  Antibiotics:  Ceftriaxone day 3    Relevant Results  WBC: 8100  Creatinine: 1.2 ---> 1.5 ---> 2.6 ---> 4.2 ---> 5.3 ---> 5.3 ---> 4.5 ---> 3.1 ---> 2.5    Microbiology:  Blood (1/9): Negative X2  Urine (1/9): Low colony count group B streptococcus  Urine (1/11): No pathogens  Wound (1/10): Gram's stain shows no PMNs.  MSSA, Candida glabrata   Operative specimens (1/12): Rare growth of group B streptococcus from 1 specimen          ASSESSMENT:  Right foot osteomyelitis/diabetes mellitus  Patient is stable on the third postoperative day after incision and drainage.  Partial hallux amputation was performed with ability to preserve the proximal portion of the digit, so that the fixation hardware was not grossly involved.  Discussed in detail previously with Dr. Pizano, and made the decision to go forward with prolonged intravenous antibiotic therapy based on the concern about the fixation hardware and the proximal bone being involved, given the growth of group B streptococcus from one of the intraoperative specimens.  Podiatry note dated 1/17 reviewed, indicating that the more proximal specimen is sterile.  Nonetheless, I believe it is prudent to continue the intended course of therapy because of the very close proximity to extensive fixation hardware, the proximal radiographic changes, and the potential for limb loss.    Acute kidney injury  Creatinine continuing to  improve       PLANS:  -    Continue ceftriaxone 2 g every 24 hours, anticipating roughly 6 weeks of therapy ending on 2/21/2024      Outpatient antibiotic orders are in the electronic record, and hardcopy has been placed in the chart                Lucho Hickey MD  [facility]  Office:  [phone]

## 2024-01-17 NOTE — PROGRESS NOTES
Nuris Squires is a 58 y.o. female on day 6 of admission presenting with Right foot infection.    Subjective   Patient seen and examined.  Resting sitting up in bed in no acute distress.  Significant other bedside.  Awake alert oriented x 3.  No new complaints.  No foot pain.  No fevers chills or sweats.  Worked with therapy.  Intermittent dizziness when up improving.  Intermittent abdominal discomfort.  No nausea, vomiting, diarrhea.  Tolerating diet.     Objective     Physical Exam  Vitals reviewed.   Constitutional:       General: She is not in acute distress.     Appearance: She is obese. She is not ill-appearing or toxic-appearing.   HENT:      Head: Normocephalic and atraumatic.      Right Ear: Tympanic membrane normal.      Left Ear: Tympanic membrane normal.      Nose: Nose normal.      Mouth/Throat:      Mouth: Mucous membranes are moist.      Pharynx: Oropharynx is clear.   Eyes:      Extraocular Movements: Extraocular movements intact.      Conjunctiva/sclera: Conjunctivae normal.      Pupils: Pupils are equal, round, and reactive to light.   Cardiovascular:      Rate and Rhythm: Normal rate and regular rhythm.      Pulses: Normal pulses.      Heart sounds: Normal heart sounds.   Pulmonary:      Effort: Pulmonary effort is normal. No respiratory distress.      Breath sounds: Normal breath sounds. No wheezing, rhonchi or rales.      Comments: Room air  Abdominal:      General: Bowel sounds are normal. There is no distension.      Palpations: Abdomen is soft.      Tenderness: There is abdominal tenderness.   Genitourinary:     Comments: Deferred  Musculoskeletal:         General: Swelling present. No tenderness. Normal range of motion.      Cervical back: Normal range of motion and neck supple.      Comments: Right foot dressing clean dry intact, boot in place   Skin:     General: Skin is warm and dry.      Capillary Refill: Capillary refill takes less than 2 seconds.      Comments: Right foot dressing  "clean dry intact, boot in place   Neurological:      General: No focal deficit present.      Mental Status: She is alert and oriented to person, place, and time.   Psychiatric:         Mood and Affect: Mood normal.         Behavior: Behavior normal.       Last Recorded Vitals  Blood pressure (!) 150/97, pulse 80, temperature 36.7 °C (98.1 °F), temperature source Oral, resp. rate 18, height 1.575 m (5' 2\"), weight 102 kg (225 lb), SpO2 100 %.    Intake/Output last 3 Shifts:  I/O last 3 completed shifts:  In: 3796 (37.2 mL/kg) [P.O.:1050; IV Piggyback:2746]  Out: - (0 mL/kg)   Weight: 102.1 kg     Relevant Results  Results for orders placed or performed during the hospital encounter of 01/09/24 (from the past 24 hour(s))   POCT GLUCOSE   Result Value Ref Range    POCT Glucose 181 (H) 74 - 99 mg/dL   CBC   Result Value Ref Range    WBC 8.7 4.4 - 11.3 x10*3/uL    nRBC 0.0 0.0 - 0.0 /100 WBCs    RBC 2.76 (L) 4.00 - 5.20 x10*6/uL    Hemoglobin 8.1 (L) 12.0 - 16.0 g/dL    Hematocrit 25.7 (L) 36.0 - 46.0 %    MCV 93 80 - 100 fL    MCH 29.3 26.0 - 34.0 pg    MCHC 31.5 (L) 32.0 - 36.0 g/dL    RDW 13.1 11.5 - 14.5 %    Platelets 427 150 - 450 x10*3/uL   Basic Metabolic Panel   Result Value Ref Range    Glucose 192 (H) 65 - 99 mg/dL    Sodium 141 133 - 145 mmol/L    Potassium 4.1 3.4 - 5.1 mmol/L    Chloride 106 97 - 107 mmol/L    Bicarbonate 25 24 - 31 mmol/L    Urea Nitrogen 27 (H) 8 - 25 mg/dL    Creatinine 2.50 (H) 0.40 - 1.60 mg/dL    eGFR 22 (L) >60 mL/min/1.73m*2    Calcium 8.4 (L) 8.5 - 10.4 mg/dL    Anion Gap 10 <=19 mmol/L   POCT GLUCOSE   Result Value Ref Range    POCT Glucose 156 (H) 74 - 99 mg/dL   POCT GLUCOSE   Result Value Ref Range    POCT Glucose 193 (H) 74 - 99 mg/dL   POCT GLUCOSE   Result Value Ref Range    POCT Glucose 217 (H) 74 - 99 mg/dL     Susceptibility data from last 90 days.  Collected Specimen Info Organism Clindamycin Erythromycin Oxacillin Tetracycline Trimethoprim/Sulfamethoxazole Vancomycin "   01/12/24 Tissue from DIGIT FIRST, RIGHT FOOT Streptococcus agalactiae (Group B Streptococcus)           Mixed Gram-Positive Bacteria          01/10/24 Tissue/Biopsy from Diabetic Foot Ulcer Methicillin Susceptible Staphylococcus aureus (MSSA) R R S S S S     Candida glabrata           Mixed Gram-Positive Bacteria          01/09/24 Urine from Clean Catch/Voided Streptococcus agalactiae (Group B Streptococcus)               XR abdomen 1 view    Result Date: 1/16/2024  Interpreted By:  Marcin Vincent, STUDY: XR ABDOMEN 1 VIEW;  1/16/2024 4:53 pm   INDICATION: Signs/Symptoms:Abdominal pain, diarrhea.   COMPARISON: None.   ACCESSION NUMBER(S): [id number]   ORDERING CLINICIAN: KARISSA BARTLETT   FINDINGS: Nonobstructive bowel gas pattern. Limited evaluation of pneumoperitoneum on supine imaging, however no gross evidence of free air is noted.   Visualized lungs are clear.   Osseous structures demonstrate no acute bony changes.       Nonobstructive, nonspecific bowel-gas pattern. No x-ray evidence for acute abnormality.   MACRO: None   Signed by: Marcin Vincent 1/16/2024 8:55 PM Dictation workstation:   [id number]    FL insert tunneled LELE ANUPAM catheter wo port pump > 5 years    Result Date: 1/16/2024  Interpreted By:  Elie Hussein, STUDY: FL INSERT TUNNELED LELE ANUPAM CATHETER WO PORT PUMP > 5 YEARS; US GUIDED PERCUTANEOUS PLACEMENT; 1/16/2024 11:55 am; 1/16/2024 11:38 am   INDICATION: Right foot, requiring extended term intravenous antibiotics; referred for insertion of a tunneled central venous catheter.   COMPARISON: None   ACCESSION NUMBER(S): [id number]; [id number]   ORDERING CLINICIAN: SOHA DELUNA   TECHNIQUE: Ultrasound-guided vascular access Fluoroscopy guided insertion of tunneled central venous catheter without port Fluoroscopy time:2 seconds Images: 1 Dose: 0.33 mGy air kerma   FINDINGS: Informed consent obtained. Patient positioned supine and connected to physiologic monitoring.  All elements of  maximal sterile barrier were utilized including cap, mask, sterile gown, sterile gloves, large sterile drape, hand scrub, 2% chlorhexidine for skin cleaning and sterile ultrasound guidance. Ultrasound of right neck demonstrated patent internal jugular vein. Under ultrasound guidance, access into the vein was established with micro puncture and permanent image archived. Small dermatotomy made few cm below clavicle. A 5 [place] single lumen cuffed hemodialysis catheter (Palindrome) was tunneled from dermatotomy to venotomy. Introducer sheath exchanged for a peel-away sheath. Distal end of the tunneled catheter advanced centrally through the peel-away sheath. Fluoroscopy confirmed tip of catheter at lower SVC. The catheter aspirated and flushed freely, secured, and covered with dressing. Patient tolerated procedure well.       Ultrasound and fluoroscopy guided insertion of tunneled central venous catheter.     Signed by: Elie Hussein 1/16/2024 12:44 PM Dictation workstation:   [id number]    US guided percutaneous placement    Result Date: 1/16/2024  Interpreted By:  Elie Hussein, STUDY: FL INSERT TUNNELED LELE ANUPAM CATHETER WO PORT PUMP > 5 YEARS; US GUIDED PERCUTANEOUS PLACEMENT; 1/16/2024 11:55 am; 1/16/2024 11:38 am   INDICATION: Right foot, requiring extended term intravenous antibiotics; referred for insertion of a tunneled central venous catheter.   COMPARISON: None   ACCESSION NUMBER(S): [id number]; [id number]   ORDERING CLINICIAN: SOHA DELUNA   TECHNIQUE: Ultrasound-guided vascular access Fluoroscopy guided insertion of tunneled central venous catheter without port Fluoroscopy time:2 seconds Images: 1 Dose: 0.33 mGy air kerma   FINDINGS: Informed consent obtained. Patient positioned supine and connected to physiologic monitoring.  All elements of maximal sterile barrier were utilized including cap, mask, sterile gown, sterile gloves, large sterile drape, hand scrub, 2% chlorhexidine for skin cleaning and  sterile ultrasound guidance. Ultrasound of right neck demonstrated patent internal jugular vein. Under ultrasound guidance, access into the vein was established with micro puncture and permanent image archived. Small dermatotomy made few cm below clavicle. A 5 [place] single lumen cuffed hemodialysis catheter (Palindrome) was tunneled from dermatotomy to venotomy. Introducer sheath exchanged for a peel-away sheath. Distal end of the tunneled catheter advanced centrally through the peel-away sheath. Fluoroscopy confirmed tip of catheter at lower SVC. The catheter aspirated and flushed freely, secured, and covered with dressing. Patient tolerated procedure well.       Ultrasound and fluoroscopy guided insertion of tunneled central venous catheter.     Signed by: Elie Hussein 1/16/2024 12:44 PM Dictation workstation:   [id number]     Scheduled medications  amLODIPine, 10 mg, oral, Daily  aspirin, 81 mg, oral, Daily  atorvastatin, 40 mg, oral, Nightly  cefTRIAXone, 2 g, intravenous, q24h  cholecalciferol, 2,000 Units, oral, Daily  dicyclomine, 10 mg, oral, 4x daily  estradiol, 2 g, vaginal, Nightly  gabapentin, 300 mg, oral, BID  heparin, 5,000 Units, subcutaneous, q8h  insulin glargine, 45 Units, subcutaneous, Nightly  insulin lispro, 0-15 Units, subcutaneous, TID with meals  insulin lispro, 5 Units, subcutaneous, TID with meals  lidocaine, 5 mL, infiltration, Once  [Held by provider] magnesium oxide, 400 mg, oral, Daily  multivitamin with minerals, 1 tablet, oral, Daily  pantoprazole, 40 mg, oral, Daily before breakfast  sennosides-docusate sodium, 2 tablet, oral, Nightly      Continuous medications  sodium bicarbonate 75 mEq, 80 mL/hr, Last Rate: 80 mL/hr (01/17/24 0227)      PRN medications  PRN medications: acetaminophen **OR** acetaminophen **OR** acetaminophen, alteplase, benzocaine-menthol, dextromethorphan-guaifenesin, dextrose 10 % in water (D10W), dextrose, glucagon, guaiFENesin, meclizine, ondansetron ODT,  polyethylene glycol      ASSESSMENT:  Status post right hallux amputation, bone biopsy  Right foot ulceration infection Staph A  Hypertension  Hyperlipidemia  Obesity  Type 2 diabetes mellitus hyperglycemia  Dizziness - orthostatic hypotension  Hypokalemia resolved  Hypoalbuminemia  Normocytic anemia  Pyuria UTI Group B Strep  Acute kidney injury improving  Diarrhea improved  Abdominal pain    PLAN:  Overall improving.  Continue post-operative care per Podiatry.  Weightbearing flatfoot or heel weightbearing right foot with post operative surgical shoe per Podiatry.  As needed analgesics Tylenol.  1/10/2024 wound culture MSSA, Candida glabrata.  1/12/2024 intraoperative cultures grew group B strep.  Urine culture grew group B strep.  Blood cultures no growth.  IV antibiotics per Infectious disease IV Ceftriaxone 2 g every 24 hrs.  Anticipate 6 weeks of therapy ending 2/21/2024.  Renal dosing.  Follow-up wound cultures.  Intermittent dizziness when up.  Repeat orthostatic blood pressures and monitor.  Renal function continues to improve.  Nephrology following.  Management per recommendations.  Continue to hold nephrotoxic medications and monitor renal function.  Intermittent abdominal discomfort, tenderness on examination.  Diarrhea improved.  KUB radiology report reviewed.  No acute findings noted.  Continue Bentyl.  PPI.  Diet as tolerated.  Further evaluation if symptoms persist.  Point of care glucose reviewed.  Continue Lantus at bedtime with snack plus scheduled insulin 5 units 3 times daily with meals + sliding scale insulin.  Adjust insulin as needed for glycemic control.  Glycemic control pertinent for healing.  Diabetes education input appreciated.  Outpatient follow-up.  PT/OT.  Activity per Podiatry as noted.  Fall precautions.  Up with assistance only.  Supportive care.  Patient reassured.  DVT prophylaxis Heparin subcutaneous.  Case management following for discharge planning.  Discharge plan home with  IV antibiotics, home health care.  Awaiting discharge arrangements.  Anticipate discharge soon.  Discussed with patient, Dr. Travis.    TAL Santana-CNP

## 2024-01-17 NOTE — PROGRESS NOTES
"Nuris Squires is a 58 y.o. female on day 6 of admission presenting with Right foot infection.    Subjective   Patient seen at bedside. Reports no pain to the operative limb today . No constitutional symptoms at this time.  Patient denies any other pedal complaints         Physical Exam    Objective     General: Patient seen resting in bed. In NAD with dressing intact to right foot without strikethrough.     Objective:      Vasc: DP and PT pulses palpable b/l. CFT is less than 3 seconds bilateral.  Skin temperature is warm to cool proximal to distal bilateral. Focal increase in temperature to right dorsal foot.      Derm: Surgical incision to dorsal right foot well approximated with sutures intact. Minimal selene-incisional maceration. No signs of dehiscence. No purulent drainage. No sings of necrosis. Previous erythema significantly improved. With decreased calor.      Neuro:  Light touch absent to the foot bilateral.       Ortho: Muscle strength is 5/5 for all pedal groups tested. No pain with palpation of b/l lower extremities.      Last Recorded Vitals  Blood pressure 154/84, pulse 81, temperature 36.5 °C (97.7 °F), temperature source Oral, resp. rate 18, height 1.575 m (5' 2\"), weight 102 kg (225 lb), SpO2 93 %.    Intake/Output last 3 Shifts:  I/O last 3 completed shifts:  In: 3796 (37.2 mL/kg) [P.O.:1050; IV Piggyback:2746]  Out: - (0 mL/kg)   Weight: 102.1 kg     Relevant Results  Scheduled medications  amLODIPine, 10 mg, oral, Daily  aspirin, 81 mg, oral, Daily  atorvastatin, 40 mg, oral, Nightly  cefTRIAXone, 2 g, intravenous, q24h  cholecalciferol, 2,000 Units, oral, Daily  dicyclomine, 10 mg, oral, 4x daily  estradiol, 2 g, vaginal, Nightly  gabapentin, 300 mg, oral, BID  heparin, 5,000 Units, subcutaneous, q8h  insulin glargine, 45 Units, subcutaneous, Nightly  insulin lispro, 0-15 Units, subcutaneous, TID with meals  insulin lispro, 5 Units, subcutaneous, TID with meals  lidocaine, 5 mL, infiltration, " Once  [Held by provider] magnesium oxide, 400 mg, oral, Daily  multivitamin with minerals, 1 tablet, oral, Daily  pantoprazole, 40 mg, oral, Daily before breakfast  sennosides-docusate sodium, 2 tablet, oral, Nightly      Continuous medications  sodium bicarbonate 75 mEq, 80 mL/hr, Last Rate: 80 mL/hr (01/17/24 0227)      PRN medications  PRN medications: acetaminophen **OR** acetaminophen **OR** acetaminophen, alteplase, benzocaine-menthol, dextromethorphan-guaifenesin, dextrose 10 % in water (D10W), dextrose, glucagon, guaiFENesin, meclizine, ondansetron ODT, polyethylene glycol   Results for orders placed or performed during the hospital encounter of 01/09/24 (from the past 24 hour(s))   POCT GLUCOSE   Result Value Ref Range    POCT Glucose 123 (H) 74 - 99 mg/dL   POCT GLUCOSE   Result Value Ref Range    POCT Glucose 264 (H) 74 - 99 mg/dL   POCT GLUCOSE   Result Value Ref Range    POCT Glucose 181 (H) 74 - 99 mg/dL   CBC   Result Value Ref Range    WBC 8.7 4.4 - 11.3 x10*3/uL    nRBC 0.0 0.0 - 0.0 /100 WBCs    RBC 2.76 (L) 4.00 - 5.20 x10*6/uL    Hemoglobin 8.1 (L) 12.0 - 16.0 g/dL    Hematocrit 25.7 (L) 36.0 - 46.0 %    MCV 93 80 - 100 fL    MCH 29.3 26.0 - 34.0 pg    MCHC 31.5 (L) 32.0 - 36.0 g/dL    RDW 13.1 11.5 - 14.5 %    Platelets 427 150 - 450 x10*3/uL   Basic Metabolic Panel   Result Value Ref Range    Glucose 192 (H) 65 - 99 mg/dL    Sodium 141 133 - 145 mmol/L    Potassium 4.1 3.4 - 5.1 mmol/L    Chloride 106 97 - 107 mmol/L    Bicarbonate 25 24 - 31 mmol/L    Urea Nitrogen 27 (H) 8 - 25 mg/dL    Creatinine 2.50 (H) 0.40 - 1.60 mg/dL    eGFR 22 (L) >60 mL/min/1.73m*2    Calcium 8.4 (L) 8.5 - 10.4 mg/dL    Anion Gap 10 <=19 mmol/L   POCT GLUCOSE   Result Value Ref Range    POCT Glucose 156 (H) 74 - 99 mg/dL    Susceptibility data from last 90 days.  Collected Specimen Info Organism Clindamycin Erythromycin Oxacillin Tetracycline Trimethoprim/Sulfamethoxazole Vancomycin   01/12/24 Tissue from DIGIT  FIRST, RIGHT FOOT Streptococcus agalactiae (Group B Streptococcus)           Mixed Gram-Positive Bacteria          01/10/24 Tissue/Biopsy from Diabetic Foot Ulcer Methicillin Susceptible Staphylococcus aureus (MSSA) R R S S S S     Candida glabrata           Mixed Gram-Positive Bacteria          01/09/24 Urine from Clean Catch/Voided Streptococcus agalactiae (Group B Streptococcus)                     Assessment/Plan   Principal Problem:    Right foot infection    Cellulitis right foot  Diabetes mellitus, poorly controled with foot ulceration  Hx of digital amputations left foot  Hx of Charcot Neuroarthropathy     Plan:     - Charts, labs, vitals and imaging all reviewed.   - Imaging: X-ray per radiology with no significant acute findings suggestive of osteomyelitis, crepitus, or air. CT reviewed with no signs of abscess.     - POD 5 from hallux amputation and midfoot bone biopsy, doing well without constitutional symptoms at this time  - Dressing change performed consisting of betadine paint, adaptic, 4x4 gauze, ABD and ACE.  - Intraoperative: Distal Amputated Toe Cx + for Group B strep  - Intraoperative: Proximal Medial cuneiform Bone Biopsy: No growth/ (1+) Rare Polymorphonuclear leukocytes ( Later specimen Labeled Digit First is bone biopsy)    - Patient is optimized for discharge with abx per ID  -PVR/ALESIA reviewed with triphasic flow b/l       Pain regimen: Per Primary  ABX: Per infectious disease     - Weightbearing Status: Flat foot or heel weightbearing right foot with surgical shoe     - Podiatry will continue to follow while in house.            Barrington Llanos DPM

## 2024-01-17 NOTE — NURSING NOTE
Assumed care. Patient report was given. Patient is laying in bed. Call light within reach and bed alarm is on.

## 2024-01-17 NOTE — PROGRESS NOTES
Physical Therapy    Physical Therapy Treatment    Patient Name: Nuris Squires  MRN: [id number]  Today's Date: 1/17/2024  Time Calculation  Start Time: 1148  Stop Time: 1212  Time Calculation (min): 24 min       Assessment/Plan   PT Assessment  End of Session Communication: Bedside nurse  End of Session Patient Position: Bed, 2 rail up, Alarm off, not on at start of session  PT Plan  Inpatient/Swing Bed or Outpatient: Inpatient  PT Plan  Treatment/Interventions: Bed mobility, Transfer training, Gait training, Stair training, Balance training, Neuromuscular re-education, Strengthening, Endurance training, Range of motion, Therapeutic exercise, Therapeutic activity, Home exercise program, Orthotic fitting/training  PT Plan: Skilled PT  PT Frequency: 4 times per week  PT Discharge Recommendations: Low intensity level of continued care  Equipment Recommended upon Discharge: Straight cane  PT Recommended Transfer Status: Contact guard  PT - OK to Discharge: Yes      General Visit Information:   PT  Visit  PT Received On: 01/17/24  General  Prior to Session Communication: Bedside nurse  Patient Position Received: Bed, 2 rail up (Seated EOB.)  General Comment: Cleared by nursing to be seen for therapy, pt agreeable with tx, supine in bed upon arrival.    Subjective   Precautions:  Precautions  Precautions Comment: RLE heel weight bearing with post-op shoe    Objective   Pain:  Pain Assessment  Pain Assessment: 0-10  Pain Score: 0 - No pain    Treatments:  Therapeutic Exercise  Therapeutic Exercise Performed: Yes  Therapeutic Exercise Activity 1: Bilateral ankle pumps x15  Therapeutic Exercise Activity 2: Bilateral hip flexion x15  Therapeutic Exercise Activity 3: Bilateral knee extension x15  Therapeutic Exercise Activity 4: Resisted hip abd/add 15    Therapeutic Activity  Therapeutic Activity Performed: No    Balance/Neuromuscular Re-Education  Balance/Neuromuscular Re-Education Activity Performed: No    Bed Mobility  Bed  Mobility: Yes  Bed Mobility 1  Bed Mobility 1: Supine to sitting  Level of Assistance 1: Close supervision    Ambulation/Gait Training  Ambulation/Gait Training Performed: Yes  Ambulation/Gait Training 1  Surface 1: Level tile  Device 1: Standard walker  Assistance 1: Close supervision  Comments/Distance (ft) 1: 50' with standard walker, presents with step to gait pattern, heavy use of UE's on walker, requires cues for upright posture, supervision for balance.  Transfers  Transfer: Yes  Transfer 1  Transfer From 1: Sit to  Transfer to 1: Stand  Transfer Level of Assistance 1: Close supervision  Trials/Comments 1: Cues for hand placement.    Stairs  Stairs: No    Outcome Measures:  [facility] Basic Mobility  Turning from your back to your side while in a flat bed without using bedrails: A little  Moving from lying on your back to sitting on the side of a flat bed without using bedrails: A little  Moving to and from bed to chair (including a wheelchair): A little  Standing up from a chair using your arms (e.g. wheelchair or bedside chair): A little  To walk in hospital room: A little  Climbing 3-5 steps with railing: A lot  Basic Mobility - Total Score: 17      Encounter Problems       Encounter Problems (Active)       Mobility       STG - Patient will ambulate 100' with LRAD and modified independence. (Progressing)       Start:  01/10/24    Expected End:  01/18/24            STG - Patient will ascend and descend four to six stairs with use of B handrails and modified independence. (Progressing)       Start:  01/10/24    Expected End:  01/18/24               PT Problem       The patient will demonstrate an overall strength of 4/5 in BLE to assist with completion of functional mobility.   (Progressing)       Start:  01/15/24    Expected End:  01/18/24            The patient will be able to adhere to heel Wb'ing RLE with use of post-op shoe with SW by DC from hospital and minimal verbal cues. (Progressing)       Start:   01/15/24    Expected End:  01/18/24               Pain - Adult          Transfers       STG - Patient will transfer sit to and from stand with LRAD and modified independence. (Progressing)       Start:  01/10/24    Expected End:  01/18/24

## 2024-01-17 NOTE — NURSING NOTE
Assumed care of patient, patient in bed and ambulated to bathroom and and back in bed, call light and possessions within reach.

## 2024-01-18 ENCOUNTER — HOME INFUSION (OUTPATIENT)
Dept: INFUSION THERAPY | Age: 59
End: 2024-01-18
Payer: COMMERCIAL

## 2024-01-18 VITALS
HEIGHT: 62 IN | TEMPERATURE: 98.1 F | DIASTOLIC BLOOD PRESSURE: 79 MMHG | WEIGHT: 225 LBS | SYSTOLIC BLOOD PRESSURE: 151 MMHG | BODY MASS INDEX: 41.41 KG/M2 | RESPIRATION RATE: 17 BRPM | HEART RATE: 90 BPM | OXYGEN SATURATION: 97 %

## 2024-01-18 LAB
ALBUMIN SERPL-MCNC: 2.8 G/DL (ref 3.5–5)
ANION GAP SERPL CALC-SCNC: 9 MMOL/L
BUN SERPL-MCNC: 28 MG/DL (ref 8–25)
CALCIUM SERPL-MCNC: 8.7 MG/DL (ref 8.5–10.4)
CHLORIDE SERPL-SCNC: 104 MMOL/L (ref 97–107)
CO2 SERPL-SCNC: 26 MMOL/L (ref 24–31)
CREAT SERPL-MCNC: 2.1 MG/DL (ref 0.4–1.6)
EGFRCR SERPLBLD CKD-EPI 2021: 27 ML/MIN/1.73M*2
ERYTHROCYTE [DISTWIDTH] IN BLOOD BY AUTOMATED COUNT: 13.1 % (ref 11.5–14.5)
FERRITIN SERPL-MCNC: 276 NG/ML (ref 13–150)
GLUCOSE BLD MANUAL STRIP-MCNC: 127 MG/DL (ref 74–99)
GLUCOSE BLD MANUAL STRIP-MCNC: 189 MG/DL (ref 74–99)
GLUCOSE BLD MANUAL STRIP-MCNC: 315 MG/DL (ref 74–99)
GLUCOSE SERPL-MCNC: 204 MG/DL (ref 65–99)
HCT VFR BLD AUTO: 25.4 % (ref 36–46)
HGB BLD-MCNC: 8 G/DL (ref 12–16)
HOLD SPECIMEN: NORMAL
IRON SATN MFR SERPL: 34 % (ref 12–50)
IRON SERPL-MCNC: 59 UG/DL (ref 30–160)
LABORATORY COMMENT REPORT: NORMAL
MCH RBC QN AUTO: 29.7 PG (ref 26–34)
MCHC RBC AUTO-ENTMCNC: 31.5 G/DL (ref 32–36)
MCV RBC AUTO: 94 FL (ref 80–100)
NRBC BLD-RTO: 0 /100 WBCS (ref 0–0)
PATH REPORT.FINAL DX SPEC: NORMAL
PATH REPORT.GROSS SPEC: NORMAL
PATH REPORT.RELEVANT HX SPEC: NORMAL
PATH REPORT.TOTAL CANCER: NORMAL
PHOSPHATE SERPL-MCNC: 3.1 MG/DL (ref 2.5–4.5)
PLATELET # BLD AUTO: 425 X10*3/UL (ref 150–450)
POTASSIUM SERPL-SCNC: 4.1 MMOL/L (ref 3.4–5.1)
RBC # BLD AUTO: 2.69 X10*6/UL (ref 4–5.2)
SODIUM SERPL-SCNC: 139 MMOL/L (ref 133–145)
TIBC SERPL-MCNC: 172 UG/DL (ref 228–428)
UIBC SERPL-MCNC: 113 UG/DL (ref 110–370)
VIT B12 SERPL-MCNC: 582 PG/ML (ref 211–946)
WBC # BLD AUTO: 8.7 X10*3/UL (ref 4.4–11.3)

## 2024-01-18 PROCEDURE — 80069 RENAL FUNCTION PANEL: CPT

## 2024-01-18 PROCEDURE — 82947 ASSAY GLUCOSE BLOOD QUANT: CPT

## 2024-01-18 PROCEDURE — 2500000004 HC RX 250 GENERAL PHARMACY W/ HCPCS (ALT 636 FOR OP/ED): Performed by: NURSE PRACTITIONER

## 2024-01-18 PROCEDURE — 2500000001 HC RX 250 WO HCPCS SELF ADMINISTERED DRUGS (ALT 637 FOR MEDICARE OP): Performed by: INTERNAL MEDICINE

## 2024-01-18 PROCEDURE — 97110 THERAPEUTIC EXERCISES: CPT | Mod: GP,CQ

## 2024-01-18 PROCEDURE — 2500000004 HC RX 250 GENERAL PHARMACY W/ HCPCS (ALT 636 FOR OP/ED): Performed by: STUDENT IN AN ORGANIZED HEALTH CARE EDUCATION/TRAINING PROGRAM

## 2024-01-18 PROCEDURE — 82607 VITAMIN B-12: CPT | Performed by: NURSE PRACTITIONER

## 2024-01-18 PROCEDURE — 82728 ASSAY OF FERRITIN: CPT | Performed by: NURSE PRACTITIONER

## 2024-01-18 PROCEDURE — 83540 ASSAY OF IRON: CPT | Performed by: NURSE PRACTITIONER

## 2024-01-18 PROCEDURE — 2500000004 HC RX 250 GENERAL PHARMACY W/ HCPCS (ALT 636 FOR OP/ED): Performed by: INTERNAL MEDICINE

## 2024-01-18 PROCEDURE — 2500000001 HC RX 250 WO HCPCS SELF ADMINISTERED DRUGS (ALT 637 FOR MEDICARE OP): Performed by: STUDENT IN AN ORGANIZED HEALTH CARE EDUCATION/TRAINING PROGRAM

## 2024-01-18 PROCEDURE — 85027 COMPLETE CBC AUTOMATED: CPT | Performed by: NURSE PRACTITIONER

## 2024-01-18 PROCEDURE — 97116 GAIT TRAINING THERAPY: CPT | Mod: GP,CQ

## 2024-01-18 RX ORDER — DICYCLOMINE HYDROCHLORIDE 10 MG/1
10 CAPSULE ORAL 3 TIMES DAILY PRN
Refills: 0
Start: 2024-01-18 | End: 2024-01-31 | Stop reason: HOSPADM

## 2024-01-18 RX ORDER — LANOLIN ALCOHOL/MO/W.PET/CERES
400 CREAM (GRAM) TOPICAL DAILY
Start: 2024-01-19 | End: 2024-02-20 | Stop reason: WASHOUT

## 2024-01-18 RX ORDER — ACETAMINOPHEN 325 MG/1
650 TABLET ORAL EVERY 4 HOURS PRN
Start: 2024-01-18

## 2024-01-18 RX ORDER — CLONIDINE HYDROCHLORIDE 0.1 MG/1
0.1 TABLET ORAL DAILY
Start: 2024-01-18 | End: 2024-01-31 | Stop reason: HOSPADM

## 2024-01-18 RX ORDER — AMLODIPINE BESYLATE 10 MG/1
10 TABLET ORAL DAILY
Qty: 30 TABLET | Refills: 0 | Status: SHIPPED | OUTPATIENT
Start: 2024-01-18 | End: 2024-02-27 | Stop reason: SDUPTHER

## 2024-01-18 RX ADMIN — AMLODIPINE BESYLATE 10 MG: 10 TABLET ORAL at 08:59

## 2024-01-18 RX ADMIN — DICYCLOMINE HYDROCHLORIDE 10 MG: 10 CAPSULE ORAL at 14:04

## 2024-01-18 RX ADMIN — CEFTRIAXONE SODIUM 2 G: 2 INJECTION, SOLUTION INTRAVENOUS at 15:32

## 2024-01-18 RX ADMIN — INSULIN LISPRO 3 UNITS: 100 INJECTION, SOLUTION INTRAVENOUS; SUBCUTANEOUS at 12:42

## 2024-01-18 RX ADMIN — DICYCLOMINE HYDROCHLORIDE 10 MG: 10 CAPSULE ORAL at 16:59

## 2024-01-18 RX ADMIN — Medication 1 TABLET: at 08:59

## 2024-01-18 RX ADMIN — ACETAMINOPHEN 650 MG: 325 TABLET ORAL at 14:38

## 2024-01-18 RX ADMIN — HEPARIN SODIUM 5000 UNITS: 5000 INJECTION, SOLUTION INTRAVENOUS; SUBCUTANEOUS at 14:04

## 2024-01-18 RX ADMIN — SODIUM BICARBONATE 80 ML/HR: 84 INJECTION, SOLUTION INTRAVENOUS at 10:05

## 2024-01-18 RX ADMIN — INSULIN LISPRO 12 UNITS: 100 INJECTION, SOLUTION INTRAVENOUS; SUBCUTANEOUS at 16:59

## 2024-01-18 RX ADMIN — ASPIRIN 81 MG: 81 TABLET, CHEWABLE ORAL at 08:59

## 2024-01-18 RX ADMIN — DICYCLOMINE HYDROCHLORIDE 10 MG: 10 CAPSULE ORAL at 06:24

## 2024-01-18 RX ADMIN — INSULIN LISPRO 5 UNITS: 100 INJECTION, SOLUTION INTRAVENOUS; SUBCUTANEOUS at 17:00

## 2024-01-18 RX ADMIN — HEPARIN SODIUM 5000 UNITS: 5000 INJECTION, SOLUTION INTRAVENOUS; SUBCUTANEOUS at 06:24

## 2024-01-18 RX ADMIN — INSULIN LISPRO 5 UNITS: 100 INJECTION, SOLUTION INTRAVENOUS; SUBCUTANEOUS at 09:03

## 2024-01-18 RX ADMIN — PANTOPRAZOLE SODIUM 40 MG: 40 TABLET, DELAYED RELEASE ORAL at 06:24

## 2024-01-18 RX ADMIN — GABAPENTIN 300 MG: 300 CAPSULE ORAL at 08:59

## 2024-01-18 RX ADMIN — INSULIN LISPRO 5 UNITS: 100 INJECTION, SOLUTION INTRAVENOUS; SUBCUTANEOUS at 12:43

## 2024-01-18 RX ADMIN — Medication 2000 UNITS: at 06:24

## 2024-01-18 ASSESSMENT — PAIN SCALES - WONG BAKER
WONGBAKER_NUMERICALRESPONSE: HURTS LITTLE MORE
WONGBAKER_NUMERICALRESPONSE: NO HURT

## 2024-01-18 ASSESSMENT — COGNITIVE AND FUNCTIONAL STATUS - GENERAL
MOBILITY SCORE: 18
TURNING FROM BACK TO SIDE WHILE IN FLAT BAD: A LITTLE
MOVING FROM LYING ON BACK TO SITTING ON SIDE OF FLAT BED WITH BEDRAILS: A LITTLE
CLIMB 3 TO 5 STEPS WITH RAILING: A LITTLE
STANDING UP FROM CHAIR USING ARMS: A LITTLE
MOVING TO AND FROM BED TO CHAIR: A LITTLE
WALKING IN HOSPITAL ROOM: A LITTLE

## 2024-01-18 ASSESSMENT — PAIN - FUNCTIONAL ASSESSMENT
PAIN_FUNCTIONAL_ASSESSMENT: 0-10
PAIN_FUNCTIONAL_ASSESSMENT: 0-10

## 2024-01-18 ASSESSMENT — PAIN SCALES - GENERAL
PAINLEVEL_OUTOF10: 7
PAINLEVEL_OUTOF10: 0 - NO PAIN
PAINLEVEL_OUTOF10: 0 - NO PAIN

## 2024-01-18 ASSESSMENT — PAIN SCALES - PAIN ASSESSMENT IN ADVANCED DEMENTIA (PAINAD)
BREATHING: NORMAL
FACIALEXPRESSION: SAD, FRIGHTENED, FROWN
NEGVOCALIZATION: OCCASIONAL MOAN/GROAN, LOW SPEECH, NEGATIVE/DISAPPROVING QUALITY

## 2024-01-18 NOTE — PROGRESS NOTES
Nuris Squires is a 58 y.o. female on day 7 of admission presenting with Right foot infection.    Subjective   Patient seen and examined.  Resting in bed in no acute distress.  Awake alert oriented x 3.  No new complaints.  No right foot pain.  Dizziness improved.  Ambulating.  No abdominal pain, nausea, vomiting, stool loose.  Tolerating diet.  Discussed plan of care, ready for discharge home with home health care.     Spoke with nursing, no new issues.    Objective     Physical Exam  Vitals reviewed.   Constitutional:       General: She is not in acute distress.     Appearance: She is obese. She is not ill-appearing or toxic-appearing.   HENT:      Head: Normocephalic and atraumatic.      Right Ear: Tympanic membrane normal.      Left Ear: Tympanic membrane normal.      Nose: Nose normal.      Mouth/Throat:      Mouth: Mucous membranes are moist.      Pharynx: Oropharynx is clear.   Eyes:      Extraocular Movements: Extraocular movements intact.      Conjunctiva/sclera: Conjunctivae normal.      Pupils: Pupils are equal, round, and reactive to light.   Cardiovascular:      Rate and Rhythm: Normal rate and regular rhythm.      Pulses: Normal pulses.      Heart sounds: Normal heart sounds.   Pulmonary:      Effort: Pulmonary effort is normal. No respiratory distress.      Breath sounds: Normal breath sounds. No wheezing, rhonchi or rales.      Comments: Room air  Abdominal:      General: Bowel sounds are normal. There is no distension.      Palpations: Abdomen is soft.      Tenderness: There is no abdominal tenderness.   Genitourinary:     Comments: Deferred  Musculoskeletal:         General: Swelling present. No tenderness. Normal range of motion.      Cervical back: Normal range of motion and neck supple.      Comments: Right foot dressing clean dry intact   Skin:     General: Skin is warm and dry.      Capillary Refill: Capillary refill takes less than 2 seconds.      Comments: Right foot dressing clean dry  "intact    Neurological:      General: No focal deficit present.      Mental Status: She is alert and oriented to person, place, and time.   Psychiatric:         Mood and Affect: Mood normal.         Behavior: Behavior normal.       Last Recorded Vitals  Blood pressure 148/74, pulse 95, temperature 36.8 °C (98.2 °F), temperature source Temporal, resp. rate 17, height 1.575 m (5' 2\"), weight 102 kg (225 lb), SpO2 96 %.    Intake/Output last 3 Shifts:  I/O last 3 completed shifts:  In: 650 (6.4 mL/kg) [P.O.:600; IV Piggyback:50]  Out: - (0 mL/kg)   Weight: 102.1 kg     Telemetry normal sinus rhythm rate 80's    Relevant Results  Results for orders placed or performed during the hospital encounter of 01/09/24 (from the past 24 hour(s))   POCT GLUCOSE   Result Value Ref Range    POCT Glucose 217 (H) 74 - 99 mg/dL   POCT GLUCOSE   Result Value Ref Range    POCT Glucose 141 (H) 74 - 99 mg/dL   CBC   Result Value Ref Range    WBC 8.7 4.4 - 11.3 x10*3/uL    nRBC 0.0 0.0 - 0.0 /100 WBCs    RBC 2.69 (L) 4.00 - 5.20 x10*6/uL    Hemoglobin 8.0 (L) 12.0 - 16.0 g/dL    Hematocrit 25.4 (L) 36.0 - 46.0 %    MCV 94 80 - 100 fL    MCH 29.7 26.0 - 34.0 pg    MCHC 31.5 (L) 32.0 - 36.0 g/dL    RDW 13.1 11.5 - 14.5 %    Platelets 425 150 - 450 x10*3/uL   PST Top   Result Value Ref Range    Extra Tube Hold for add-ons.    Iron and TIBC   Result Value Ref Range    Iron 59 30 - 160 ug/dL    UIBC 113 110 - 370 ug/dL    TIBC 172 (L) 228 - 428 ug/dL    % Saturation 34 12 - 50 %   Ferritin   Result Value Ref Range    Ferritin 276 (H) 13 - 150 ng/mL   Vitamin B12   Result Value Ref Range    Vitamin B12 582 211 - 946 pg/mL   Renal Function Panel   Result Value Ref Range    Glucose 204 (H) 65 - 99 mg/dL    Sodium 139 133 - 145 mmol/L    Potassium 4.1 3.4 - 5.1 mmol/L    Chloride 104 97 - 107 mmol/L    Bicarbonate 26 24 - 31 mmol/L    Urea Nitrogen 28 (H) 8 - 25 mg/dL    Creatinine 2.10 (H) 0.40 - 1.60 mg/dL    eGFR 27 (L) >60 mL/min/1.73m*2    " Calcium 8.7 8.5 - 10.4 mg/dL    Phosphorus 3.1 2.5 - 4.5 mg/dL    Albumin 2.8 (L) 3.5 - 5.0 g/dL    Anion Gap 9 <=19 mmol/L   POCT GLUCOSE   Result Value Ref Range    POCT Glucose 127 (H) 74 - 99 mg/dL   POCT GLUCOSE   Result Value Ref Range    POCT Glucose 189 (H) 74 - 99 mg/dL     XR abdomen 1 view    Result Date: 1/16/2024  Interpreted By:  Marcin Vincent, STUDY: XR ABDOMEN 1 VIEW;  1/16/2024 4:53 pm   INDICATION: Signs/Symptoms:Abdominal pain, diarrhea.   COMPARISON: None.   ACCESSION NUMBER(S): [id number]   ORDERING CLINICIAN: KARISSA BARTLETT   FINDINGS: Nonobstructive bowel gas pattern. Limited evaluation of pneumoperitoneum on supine imaging, however no gross evidence of free air is noted.   Visualized lungs are clear.   Osseous structures demonstrate no acute bony changes.       Nonobstructive, nonspecific bowel-gas pattern. No x-ray evidence for acute abnormality.   MACRO: None   Signed by: Marcin Vincent 1/16/2024 8:55 PM Dictation workstation:   [id number]     Scheduled medications  amLODIPine, 10 mg, oral, Daily  aspirin, 81 mg, oral, Daily  atorvastatin, 40 mg, oral, Nightly  cefTRIAXone, 2 g, intravenous, q24h  cholecalciferol, 2,000 Units, oral, Daily  dicyclomine, 10 mg, oral, 4x daily  estradiol, 2 g, vaginal, Nightly  gabapentin, 300 mg, oral, BID  heparin, 5,000 Units, subcutaneous, q8h  insulin glargine, 45 Units, subcutaneous, Nightly  insulin lispro, 0-15 Units, subcutaneous, TID with meals  insulin lispro, 5 Units, subcutaneous, TID with meals  lidocaine, 5 mL, infiltration, Once  [Held by provider] magnesium oxide, 400 mg, oral, Daily  multivitamin with minerals, 1 tablet, oral, Daily  pantoprazole, 40 mg, oral, Daily before breakfast  sennosides-docusate sodium, 2 tablet, oral, Nightly      Continuous medications  sodium bicarbonate 75 mEq, 80 mL/hr, Last Rate: 80 mL/hr (01/18/24 1005)      PRN medications  PRN medications: acetaminophen **OR** acetaminophen **OR** acetaminophen,  alteplase, benzocaine-menthol, dextromethorphan-guaifenesin, dextrose 10 % in water (D10W), dextrose, glucagon, guaiFENesin, meclizine, ondansetron ODT, polyethylene glycol    ASSESSMENT:  Status post right hallux amputation, bone biopsy  Right foot ulceration infection Staph A  Hypertension  Hyperlipidemia  Obesity  Type 2 diabetes mellitus hyperglycemia  Dizziness - orthostatic hypotension - resolved  Hypokalemia resolved  Hypoalbuminemia  Normocytic anemia  Pyuria UTI Group B Strep  Acute kidney injury improving  Diarrhea improved  Abdominal pain resolved    PLAN:  Patient is doing well this afternoon.  No new issues.  Continue post-operative care per Podiatry.  Weightbearing flatfoot or heel weightbearing right foot with postoperative surgical shoe per podiatry.  As needed analgesics, Tylenol.  1/10/2024 wound culture MSSA, Candida glabrata.  1/12/2024 intraoperative cultures grew group B strep.  Urine culture grew group B strep.  Blood cultures no growth.  Continue IV antibiotics per Infectious Disease IV Ceftriaxone 2 g every 24 hours - Anticipate 6 weeks of therapy ending 2/21/2024 via Serena catheter.  Case management following for discharge antibiotics arrangements.  Symptoms improved.  Dizziness improved.  Ambulating.  Orthostatic blood pressures negative.  Abdominal examination benign.  Tolerating diet.  Continue current medications.  PT/OT.  Fall precautions.  Point of care glucose reviewed.  Continue insulin.  Glycemic control pertinent.  Diabetes education.  Outpatient follow up with PCP for blood glucose monitoring and management.  Outpatient Ophthalmology and Podiatry follow-up.  PT/OT.  Fall precautions.  Up with assistance only.  Supportive care.  Patient reassured.  DVT prophylaxis Heparin subcutaneous.  Case management following for discharge planning.  Discharge plan with IV antibiotics, home health care.  Discussed with Dr. Travis.  Evangelina to discharge when arrangements made by case  management.    Celeste Hill, APRN-CNP

## 2024-01-18 NOTE — NURSING NOTE
Assumed care of patient is resting in bed with brake in place and call light in reach denies pain    Call from [facility] (homecare)    States if PCP can order a bottle of the Lotrel     As this med is bubble packed     Med was increased to twice a day on 11-25-20     Pharm t'd up   ([facility])

## 2024-01-18 NOTE — PROGRESS NOTES
"Nuris Squires is a 58 y.o. female on day 7 of admission presenting with Right foot infection.    Subjective   Patient seen at bedside. Patient is still awaiting [facility] insurance Authorization. Reports no pain to the operative limb today . No constitutional symptoms at this time.  Patient denies any other pedal complaints         Physical Exam    Objective     General: Patient seen resting in bed. In NAD with dressing intact to right foot without strikethrough.     Objective:      Vasc: DP and PT pulses palpable b/l. CFT is less than 3 seconds bilateral.  Skin temperature is warm to cool proximal to distal bilateral. Focal increase in temperature to right dorsal foot.      Derm: Surgical incision to dorsal right foot well approximated with sutures intact. Minimal selene-incisional maceration. No signs of dehiscence. No purulent drainage. No sings of necrosis. Previous erythema significantly improved. With decreased calor.      Neuro:  Light touch absent to the foot bilateral.       Ortho: Muscle strength is 5/5 for all pedal groups tested. No pain with palpation of b/l lower extremities.      Last Recorded Vitals  Blood pressure 140/69, pulse 85, temperature 36.8 °C (98.2 °F), temperature source Temporal, resp. rate 17, height 1.575 m (5' 2\"), weight 102 kg (225 lb), SpO2 96 %.    Intake/Output last 3 Shifts:  I/O last 3 completed shifts:  In: 650 (6.4 mL/kg) [P.O.:600; IV Piggyback:50]  Out: - (0 mL/kg)   Weight: 102.1 kg     Relevant Results  Scheduled medications  amLODIPine, 10 mg, oral, Daily  aspirin, 81 mg, oral, Daily  atorvastatin, 40 mg, oral, Nightly  cefTRIAXone, 2 g, intravenous, q24h  cholecalciferol, 2,000 Units, oral, Daily  dicyclomine, 10 mg, oral, 4x daily  estradiol, 2 g, vaginal, Nightly  gabapentin, 300 mg, oral, BID  heparin, 5,000 Units, subcutaneous, q8h  insulin glargine, 45 Units, subcutaneous, Nightly  insulin lispro, 0-15 Units, subcutaneous, TID with meals  insulin lispro, 5 Units, " subcutaneous, TID with meals  lidocaine, 5 mL, infiltration, Once  [Held by provider] magnesium oxide, 400 mg, oral, Daily  multivitamin with minerals, 1 tablet, oral, Daily  pantoprazole, 40 mg, oral, Daily before breakfast  sennosides-docusate sodium, 2 tablet, oral, Nightly      Continuous medications  sodium bicarbonate 75 mEq, 80 mL/hr, Last Rate: 80 mL/hr (01/18/24 1005)      PRN medications  PRN medications: acetaminophen **OR** acetaminophen **OR** acetaminophen, alteplase, benzocaine-menthol, dextromethorphan-guaifenesin, dextrose 10 % in water (D10W), dextrose, glucagon, guaiFENesin, meclizine, ondansetron ODT, polyethylene glycol   Results for orders placed or performed during the hospital encounter of 01/09/24 (from the past 24 hour(s))   POCT GLUCOSE   Result Value Ref Range    POCT Glucose 217 (H) 74 - 99 mg/dL   POCT GLUCOSE   Result Value Ref Range    POCT Glucose 141 (H) 74 - 99 mg/dL   CBC   Result Value Ref Range    WBC 8.7 4.4 - 11.3 x10*3/uL    nRBC 0.0 0.0 - 0.0 /100 WBCs    RBC 2.69 (L) 4.00 - 5.20 x10*6/uL    Hemoglobin 8.0 (L) 12.0 - 16.0 g/dL    Hematocrit 25.4 (L) 36.0 - 46.0 %    MCV 94 80 - 100 fL    MCH 29.7 26.0 - 34.0 pg    MCHC 31.5 (L) 32.0 - 36.0 g/dL    RDW 13.1 11.5 - 14.5 %    Platelets 425 150 - 450 x10*3/uL   PST Top   Result Value Ref Range    Extra Tube Hold for add-ons.    Iron and TIBC   Result Value Ref Range    Iron 59 30 - 160 ug/dL    UIBC 113 110 - 370 ug/dL    TIBC 172 (L) 228 - 428 ug/dL    % Saturation 34 12 - 50 %   Ferritin   Result Value Ref Range    Ferritin 276 (H) 13 - 150 ng/mL   Vitamin B12   Result Value Ref Range    Vitamin B12 582 211 - 946 pg/mL   Renal Function Panel   Result Value Ref Range    Glucose 204 (H) 65 - 99 mg/dL    Sodium 139 133 - 145 mmol/L    Potassium 4.1 3.4 - 5.1 mmol/L    Chloride 104 97 - 107 mmol/L    Bicarbonate 26 24 - 31 mmol/L    Urea Nitrogen 28 (H) 8 - 25 mg/dL    Creatinine 2.10 (H) 0.40 - 1.60 mg/dL    eGFR 27 (L) >60  mL/min/1.73m*2    Calcium 8.7 8.5 - 10.4 mg/dL    Phosphorus 3.1 2.5 - 4.5 mg/dL    Albumin 2.8 (L) 3.5 - 5.0 g/dL    Anion Gap 9 <=19 mmol/L   POCT GLUCOSE   Result Value Ref Range    POCT Glucose 127 (H) 74 - 99 mg/dL    Susceptibility data from last 90 days.  Collected Specimen Info Organism Clindamycin Erythromycin Oxacillin Tetracycline Trimethoprim/Sulfamethoxazole Vancomycin   01/12/24 Tissue from DIGIT FIRST, RIGHT FOOT Streptococcus agalactiae (Group B Streptococcus)           Mixed Gram-Positive Bacteria          01/10/24 Tissue/Biopsy from Diabetic Foot Ulcer Methicillin Susceptible Staphylococcus aureus (MSSA) R R S S S S     Candida glabrata           Mixed Gram-Positive Bacteria          01/09/24 Urine from Clean Catch/Voided Streptococcus agalactiae (Group B Streptococcus)                     Assessment/Plan   Principal Problem:    Right foot infection    Cellulitis right foot  Diabetes mellitus, poorly controled with foot ulceration  Hx of digital amputations left foot  Hx of Charcot Neuroarthropathy     Plan:     - Charts, labs, vitals and imaging all reviewed.   - Imaging: X-ray per radiology with no significant acute findings suggestive of osteomyelitis, crepitus, or air. CT reviewed with no signs of abscess.     - POD 6 from hallux amputation and midfoot bone biopsy, doing well without constitutional symptoms at this time  - Dressing Order placed betadine paint, adaptic, 4x4 gauze, ABD and ACE.  - Intraoperative: Distal Amputated Toe Cx + for Group B strep  - Intraoperative: Proximal Medial cuneiform Bone Biopsy: No growth/ (1+) Rare Polymorphonuclear leukocytes ( Later specimen Labeled Digit First is bone biopsy)  - Patient awaiting [facility] Preauthorization  - Patient is optimized for discharge with abx per ID  -PVR/ALESIA reviewed with triphasic flow b/l       Pain regimen: Per Primary  ABX: Per infectious disease     - Weightbearing Status: Flat foot or heel weightbearing right foot with surgical  shoe     - Podiatry will continue to follow while in house.            Barrington Llanos DPM

## 2024-01-18 NOTE — PROGRESS NOTES
Diagnosis right foot infection Staph a  PMH: T2DM, HTN, HLD, s/p R hallux amputation  No Known Allergies   Review of labs at discharge  Line info: pt has a SL PICC to be managed per [facility] protocol  Pt ordered IV ceftriaxone 2g q24h through 2/21/24  Placed in minibag plus  Care plan done today    Nuris Squires is a 58 y.o. female discharged from hospital to [facility] for skilled nursing and pharmacy services.  Followed by Dr. Hickey (ID), and podiatry    Rx dispensed the following with flushes and supplies to match with delivery by 1/18 9pm  7x ceftriaxone 2g in 100mL NS MB+  DOS 1/19-1/25    Follow up 1/25 : progress, labs, delivery straight    Spoke at length with patient. Verified correct address and phone #. Reviewed [facility] services and answered all questions. RN to call pt with time of apt.   Pt agreed to delivery by 9pm this evening.  to call [phone] before delivery. **per patient, can leave by green chairs on porch** but please check with pt via phone before leaving.

## 2024-01-18 NOTE — CARE PLAN
The patient's goals for the shift include Safety    The clinical goals for the shift include Patient's pain will be within her acceptable limits this shift.      Problem: Pain  Goal: My pain/discomfort is manageable  Outcome: Progressing     Problem: Safety  Goal: Patient will be injury free during hospitalization  Outcome: Progressing     Problem: Daily Care  Goal: Daily care needs are met  Outcome: Progressing     Problem: Psychosocial Needs  Goal: Demonstrates ability to cope with hospitalization/illness  Outcome: Progressing  Goal: Collaborate with me, my family, and caregiver to identify my specific goals  Outcome: Progressing     Problem: Discharge Barriers  Goal: My discharge needs are met  Outcome: Progressing     Problem: Skin  Goal: Prevent/manage excess moisture  Outcome: Progressing  Goal: Prevent/minimize sheer/friction injuries  Outcome: Progressing     Problem: Diabetes  Goal: Achieve decreasing blood glucose levels by end of shift  Outcome: Progressing  Goal: Increase stability of blood glucose readings by end of shift  Outcome: Progressing  Goal: Decrease in ketones present in urine by end of shift  Outcome: Progressing  Goal: Maintain electrolyte levels within acceptable range throughout shift  Outcome: Progressing  Goal: Maintain glucose levels >70mg/dl to <250mg/dl throughout shift  Outcome: Progressing  Goal: Learn about and adhere to nutrition recommendations by end of shift  Outcome: Progressing  Goal: Receive DSME education by end of shift  Outcome: Progressing  Goal: No changes in neurological exam by end of shift  Outcome: Progressing  Goal: Vital signs within normal range for age by end of shift  Outcome: Progressing  Goal: Increase self care and/or family involovement by end of shift  Outcome: Progressing

## 2024-01-18 NOTE — PROGRESS NOTES
Nuris Squires is a 58 y.o. female on day 7 of admission presenting with Right foot infection.      Subjective    Patient seen in room alert awake, family member at bedside.  Patient relates she is feeling better awaiting possible discharge patient denies any shortness of breath, nausea and vomiting, chest pain.  Patient reports intermittent diarrhea which seems to be normal at this point for her with start of antibiotics.  Patient relays she is doing well has no current complaints       Objective          Vitals 24HR  Heart Rate:  [80-95]   Temp:  [36.7 °C (98.1 °F)-36.9 °C (98.4 °F)]   Resp:  [16-18]   BP: (136-150)/(63-97)   SpO2:  [95 %-100 %]         Intake/Output last 3 Shifts:    Intake/Output Summary (Last 24 hours) at 1/18/2024 1230  Last data filed at 1/17/2024 1714  Gross per 24 hour   Intake 375 ml   Output --   Net 375 ml       Physical Exam  Constitutional:       Appearance: Normal appearance.   HENT:      Head: Normocephalic and atraumatic.      Mouth/Throat:      Mouth: Mucous membranes are moist.   Cardiovascular:      Rate and Rhythm: Normal rate and regular rhythm.   Pulmonary:      Effort: Pulmonary effort is normal.      Breath sounds: Normal breath sounds.   Abdominal:      General: Bowel sounds are normal. There is no distension.      Palpations: Abdomen is soft.      Tenderness: There is no abdominal tenderness. There is no guarding or rebound.   Musculoskeletal:         General: Signs of injury present.      Comments: Right foot ace bandage clean dry and intact    Skin:     General: Skin is warm.      Capillary Refill: Capillary refill takes less than 2 seconds.   Neurological:      Mental Status: She is alert and oriented to person, place, and time. Mental status is at baseline.   Psychiatric:         Behavior: Behavior normal.         Relevant Results  Results for orders placed or performed during the hospital encounter of 01/09/24 (from the past 24 hour(s))   POCT GLUCOSE   Result Value  Ref Range    POCT Glucose 217 (H) 74 - 99 mg/dL   POCT GLUCOSE   Result Value Ref Range    POCT Glucose 141 (H) 74 - 99 mg/dL   CBC   Result Value Ref Range    WBC 8.7 4.4 - 11.3 x10*3/uL    nRBC 0.0 0.0 - 0.0 /100 WBCs    RBC 2.69 (L) 4.00 - 5.20 x10*6/uL    Hemoglobin 8.0 (L) 12.0 - 16.0 g/dL    Hematocrit 25.4 (L) 36.0 - 46.0 %    MCV 94 80 - 100 fL    MCH 29.7 26.0 - 34.0 pg    MCHC 31.5 (L) 32.0 - 36.0 g/dL    RDW 13.1 11.5 - 14.5 %    Platelets 425 150 - 450 x10*3/uL   PST Top   Result Value Ref Range    Extra Tube Hold for add-ons.    Iron and TIBC   Result Value Ref Range    Iron 59 30 - 160 ug/dL    UIBC 113 110 - 370 ug/dL    TIBC 172 (L) 228 - 428 ug/dL    % Saturation 34 12 - 50 %   Ferritin   Result Value Ref Range    Ferritin 276 (H) 13 - 150 ng/mL   Vitamin B12   Result Value Ref Range    Vitamin B12 582 211 - 946 pg/mL   Renal Function Panel   Result Value Ref Range    Glucose 204 (H) 65 - 99 mg/dL    Sodium 139 133 - 145 mmol/L    Potassium 4.1 3.4 - 5.1 mmol/L    Chloride 104 97 - 107 mmol/L    Bicarbonate 26 24 - 31 mmol/L    Urea Nitrogen 28 (H) 8 - 25 mg/dL    Creatinine 2.10 (H) 0.40 - 1.60 mg/dL    eGFR 27 (L) >60 mL/min/1.73m*2    Calcium 8.7 8.5 - 10.4 mg/dL    Phosphorus 3.1 2.5 - 4.5 mg/dL    Albumin 2.8 (L) 3.5 - 5.0 g/dL    Anion Gap 9 <=19 mmol/L   POCT GLUCOSE   Result Value Ref Range    POCT Glucose 127 (H) 74 - 99 mg/dL   POCT GLUCOSE   Result Value Ref Range    POCT Glucose 189 (H) 74 - 99 mg/dL               Assessment/Plan      1.  Acute kidney injury she is improving significantly creatinine continues to trend down at 2.10 from 2.50 yesterday .  Will continue with  plan to monitor, no indication for dialysis  2.  Chronic kidney disease stage III- improving as above   - baseline Cr 1.2-1.5  3.  Cellulitis of the foot - continue on  antibiotics per ID  4.  Diabetes mellitus          Erwin Silverman, APRN-CNP

## 2024-01-18 NOTE — NURSING NOTE
Reviewed discharge with patient and spouse, aware of medications, needs for appointments with upcoming appointments. Leaving with Serena for discharge antibiotics.

## 2024-01-18 NOTE — NURSING NOTE
Assumed care of patient after report from departing RN. Pt lying in bed watching TV, respirations are even and unlabored, and no distress noted on exam. Patient's  is at the bedside. Patient expresses no needs at this time. Bed in locked and lowered position with call light within reach. All other safety measures are intact. Will continue with current plan of care and update as necessary.

## 2024-01-18 NOTE — PROGRESS NOTES
Physical Therapy    Physical Therapy Treatment    Patient Name: Nuris Squires  MRN: [id number]  Today's Date: 1/18/2024  Time Calculation  Start Time: 1117  Stop Time: 1140  Time Calculation (min): 23 min       Assessment/Plan   PT Assessment  End of Session Communication: Bedside nurse  End of Session Patient Position: Bed, 2 rail up  PT Plan  Inpatient/Swing Bed or Outpatient: Inpatient  PT Plan  Treatment/Interventions: Bed mobility, Transfer training, Gait training, Stair training, Balance training, Neuromuscular re-education, Strengthening, Endurance training, Range of motion, Therapeutic exercise, Therapeutic activity, Home exercise program, Orthotic fitting/training  PT Plan: Skilled PT  PT Frequency: 4 times per week  PT Discharge Recommendations: Low intensity level of continued care  Equipment Recommended upon Discharge: Straight cane  PT Recommended Transfer Status: Contact guard  PT - OK to Discharge: Yes      General Visit Information:   PT  Visit  PT Received On: 01/18/24  General  Family/Caregiver Present: Yes  Caregiver Feedback: Spouse  Prior to Session Communication: Bedside nurse  Patient Position Received: Bed, 2 rail up  General Comment: Cleared by nursing to be seen for therapy, pt agreeable with tx, seated EOB upon arrival.    Subjective   Precautions:  Precautions  LE Weight Bearing Status: Heel Weight Bearing in Post-Op Shoe    Objective   Pain:  Pain Assessment  Pain Assessment: 0-10  Pain Score: 0 - No pain    Treatments:  Therapeutic Exercise  Therapeutic Exercise Performed: Yes  Therapeutic Exercise Activity 1: Bilateral ankle pumps x15  Therapeutic Exercise Activity 2: Bilateral hip flexion x15  Therapeutic Exercise Activity 3: Bilateral knee extension x15  Therapeutic Exercise Activity 4: Resisted hip abd/add 15    Therapeutic Activity  Therapeutic Activity Performed: No    Balance/Neuromuscular Re-Education  Balance/Neuromuscular Re-Education Activity Performed: No    Bed Mobility  Bed  Mobility: No    Ambulation/Gait Training  Ambulation/Gait Training Performed: Yes  Ambulation/Gait Training 1  Surface 1: Level tile  Device 1: Standard walker  Assistance 1: Close supervision  Comments/Distance (ft) 1: 40' with stadard walker, left post op shoe donned, requires cues for upright posture, supervision for balance.  Transfers  Transfer: Yes  Transfer 1  Transfer From 1: Sit to  Transfer to 1: Stand  Transfer Level of Assistance 1: Close supervision  Trials/Comments 1: Improved hand placement. Cues for safety with all functional mobility.    Stairs  Stairs: No    Outcome Measures:  [facility] Basic Mobility  Turning from your back to your side while in a flat bed without using bedrails: A little  Moving from lying on your back to sitting on the side of a flat bed without using bedrails: A little  Moving to and from bed to chair (including a wheelchair): A little  Standing up from a chair using your arms (e.g. wheelchair or bedside chair): A little  To walk in hospital room: A little  Climbing 3-5 steps with railing: A little  Basic Mobility - Total Score: 18         Encounter Problems       Encounter Problems (Active)       Mobility       STG - Patient will ambulate 100' with LRAD and modified independence. (Progressing)       Start:  01/10/24    Expected End:  01/18/24            STG - Patient will ascend and descend four to six stairs with use of B handrails and modified independence. (Progressing)       Start:  01/10/24    Expected End:  01/18/24               PT Problem       The patient will demonstrate an overall strength of 4/5 in BLE to assist with completion of functional mobility.   (Progressing)       Start:  01/15/24    Expected End:  01/18/24            The patient will be able to adhere to heel Wb'ing RLE with use of post-op shoe with SW by DC from hospital and minimal verbal cues. (Progressing)       Start:  01/15/24    Expected End:  01/18/24               Transfers       STG - Patient will  transfer sit to and from stand with LRAD and modified independence. (Progressing)       Start:  01/10/24    Expected End:  01/18/24

## 2024-01-18 NOTE — NURSING NOTE
Upon rounding right chest tunneled catheter with current CHG dressing dry and intact, IVF infusing.

## 2024-01-18 NOTE — PROGRESS NOTES
Anticipate discharge soon. Patient all set up for [facility] and IV infusion. They will deliver medication this evening and start care on Friday. Nina Hill CNP aware. If patient does not discharge on this day, TCC to notify  Infusion team tomorrow. Will follow.     **PATIENT WITH A SAFE DISCHARGE PLAN      Cesia Garcia RN

## 2024-01-19 ENCOUNTER — TELEPHONE (OUTPATIENT)
Dept: CARE COORDINATION | Facility: CLINIC | Age: 59
End: 2024-01-19
Payer: COMMERCIAL

## 2024-01-19 ENCOUNTER — PATIENT OUTREACH (OUTPATIENT)
Dept: PRIMARY CARE | Facility: CLINIC | Age: 59
End: 2024-01-19
Payer: COMMERCIAL

## 2024-01-19 ENCOUNTER — HOME CARE VISIT (OUTPATIENT)
Dept: HOME HEALTH SERVICES | Facility: HOME HEALTH | Age: 59
End: 2024-01-19
Payer: COMMERCIAL

## 2024-01-19 VITALS
DIASTOLIC BLOOD PRESSURE: 90 MMHG | HEART RATE: 81 BPM | OXYGEN SATURATION: 96 % | SYSTOLIC BLOOD PRESSURE: 155 MMHG | RESPIRATION RATE: 18 BRPM | TEMPERATURE: 97.7 F

## 2024-01-19 DIAGNOSIS — L08.9 RIGHT FOOT INFECTION: ICD-10-CM

## 2024-01-19 PROCEDURE — G0299 HHS/HOSPICE OF RN EA 15 MIN: HCPCS

## 2024-01-19 PROCEDURE — 0023 HH SOC

## 2024-01-19 ASSESSMENT — ENCOUNTER SYMPTOMS
PERSON REPORTING PAIN: PATIENT
DENIES PAIN: 1
CHANGE IN APPETITE: UNCHANGED
APPETITE LEVEL: GOOD

## 2024-01-19 ASSESSMENT — ACTIVITIES OF DAILY LIVING (ADL)
OASIS_M1830: 02
ENTERING_EXITING_HOME: SUPERVISION

## 2024-01-19 NOTE — PROGRESS NOTES
Discharge Facility: [facility]  Discharge Diagnosis: Right foot infection  Admission Date: 1/9/2024  Procedure Date: 1/12/2024 Amputation digit foot, right  Discharge Date: 1/18/2024    PCP Appointment Date: 1/25/2024 09:30  Specialist Appointment Date: Dr. Eric Leon, Nephrology in 2 weeks,  2/21/2024 10:30 Dr. Lucho Hickey, Infectious Disease  Hospital Encounter and Summary: Linked   See discharge assessment below for further details    Medications  Medications reviewed with patient/caregiver?: No ([facility] SN at the house at time of call.) (1/19/2024  9:20 AM)  Prescription Comments: IV ceftriaxone 2g q24h ordered through 2/21/24. Rx's for acetaminophen and meclizine given. Empagliflozin, lisinopril, and metformin d/c'ed. (1/19/2024  9:20 AM)    Appointments  Does the patient have a primary care provider?: Yes (1/19/2024  9:20 AM)  Care Management Interventions: Verified appointment date/time/provider (1/19/2024  9:20 AM)  Has the patient kept scheduled appointments due by today?: Not applicable (1/19/2024  9:20 AM)    Self Management  What is the home health agency?: [facility] (1/19/2024  9:20 AM)  Has home health visited the patient within 72 hours of discharge?: Yes (1/19/2024  9:20 AM)  What Durable Medical Equipment (DME) was ordered?: N/A (1/19/2024  9:20 AM)    Patient Teaching  Does the patient have access to their discharge instructions?: Yes (1/19/2024  9:20 AM)  What is the patient's perception of their health status since discharge?: Improving (Patient denies any pain in foot. States she is feeling well.) (1/19/2024  9:20 AM)  Is the patient/caregiver able to teach back the hierarchy of who to call/visit for symptoms/problems? PCP, Specialist, Home Health nurse, Urgent Care, ED, 911: Yes (1/19/2024  9:20 AM)    Wrap Up  Wrap Up Additional Comments: 58yoF PMHx of chronic kidney disease stage III, diabetes mellitus, charcot neuroarthropathy s/p reconstruction 2020 who presented to the hospital with an infection of  her right toe. Patient also has a h/o of partial hallux and 2nd digit amputation on the left foot for infection. Podiatry and ID consulted to foot infection. Nephrology consulted for VENKAT. Patient had a toe amputation right foot on 1/12/2024. IV ceftriaxone 2g q24h ordered through 2/21/24. Patient discharged to home with [facility] to follow. Rx's for acetaminophen and meclizine given. Empagliflozin, lisinopril, and metformin d/c'ed. (1/19/2024  9:20 AM)

## 2024-01-19 NOTE — HOME HEALTH
RN IV SOC visit. Patient recently hospitalized for right foot infection Staph, s/p R hallux amputation. PMH of T2DM, HTN, HLD. VSS with BP slightly elevated. Patient not yet taken medications. No major complaints of pain. No falls reported. Patient ambulates with walker. Medications reviewed, patient noncompliant. Patient did not show bottles to RN. Education provided on indications and side effects. Patient did not take Lantus last night. BG during visit 426. Plan for patient to take Lantus 70units and meal time lispro. If BG does not down trend, patient to return to ER.    Education and demonstration provided on hand hygiene and SASH method of infusion. Patient with tunneled R chest SL PICC line, flushes easily with brisk blood return. Drsg last changed 1.16 CDI. Education provided on keeping drsg clean and dry. Education provided on when to seek medical attention. Trouble shooting provided. RN notified office staff of need for reteach visit tomorrow, order placed.    Referal without wound care orders. Discharging provided notifed, ordered were placed. Patient was not sent home with any wound care supplies. Trunk stock used. Supply order placed. Pictures and measurements uploaded. Education provided on nutrition as it's related to wound healing; encouraging protein, vitamin c, vitamin a, and zinc. Education provided on keeping BG WNL to promote wound healing.    Patient agreeable to PT OT SN    SN to follow for medication management, BG monitoring, central line maintance, labs      supplies

## 2024-01-20 ENCOUNTER — HOME CARE VISIT (OUTPATIENT)
Dept: HOME HEALTH SERVICES | Facility: HOME HEALTH | Age: 59
End: 2024-01-20
Payer: COMMERCIAL

## 2024-01-20 PROCEDURE — G0299 HHS/HOSPICE OF RN EA 15 MIN: HCPCS

## 2024-01-22 ENCOUNTER — HOME CARE VISIT (OUTPATIENT)
Dept: HOME HEALTH SERVICES | Facility: HOME HEALTH | Age: 59
End: 2024-01-22
Payer: COMMERCIAL

## 2024-01-22 ENCOUNTER — APPOINTMENT (OUTPATIENT)
Dept: CARE COORDINATION | Facility: CLINIC | Age: 59
End: 2024-01-22
Payer: COMMERCIAL

## 2024-01-22 PROCEDURE — G0152 HHCP-SERV OF OT,EA 15 MIN: HCPCS

## 2024-01-22 PROCEDURE — G0299 HHS/HOSPICE OF RN EA 15 MIN: HCPCS

## 2024-01-22 PROCEDURE — G0151 HHCP-SERV OF PT,EA 15 MIN: HCPCS

## 2024-01-22 SDOH — HEALTH STABILITY: PHYSICAL HEALTH: EXERCISE TYPE: SEATED HEP

## 2024-01-22 SDOH — HEALTH STABILITY: PHYSICAL HEALTH: PHYSICAL EXERCISE: SITTING

## 2024-01-22 SDOH — HEALTH STABILITY: PHYSICAL HEALTH: EXERCISE ACTIVITY: SEATED HEP

## 2024-01-22 SDOH — HEALTH STABILITY: PHYSICAL HEALTH: EXERCISE ACTIVITIES SETS: 3

## 2024-01-22 SDOH — ECONOMIC STABILITY: HOUSING INSECURITY: OPEN FLAME PRESENT: 1

## 2024-01-22 SDOH — HEALTH STABILITY: PHYSICAL HEALTH: PHYSICAL EXERCISE: 10

## 2024-01-22 SDOH — ECONOMIC STABILITY: HOUSING INSECURITY
HOME SAFETY: THERAPIST DISCUSSED WITH PATIENT AND CAREGIVER IMPORTANCE OF HOME SAFETY, SPECIFICALLY FOR CLUTTER FREE WALKING PATHWAYS AND ADEUQATE LIGHTING

## 2024-01-22 SDOH — HEALTH STABILITY: PHYSICAL HEALTH: EXERCISE COMMENTS: SEATED HEP INCLUDES ANKLE PUMPS, LAQS, HIP FLEXION, AND HIP ABDUCTION

## 2024-01-22 ASSESSMENT — ACTIVITIES OF DAILY LIVING (ADL)
AMBULATION ASSISTANCE: 1
CURRENT_FUNCTION: ONE PERSON
PHYSICAL TRANSFERS ASSESSED: 1
AMBULATION ASSISTANCE: ONE PERSON
AMBULATION ASSISTANCE: STAND BY ASSIST
AMBULATION ASSISTANCE ON FLAT SURFACES: 1
CURRENT_FUNCTION: SUPERVISION

## 2024-01-22 ASSESSMENT — ENCOUNTER SYMPTOMS
PAIN LOCATION - EXACERBATING FACTORS: PROLONGED STANDING
LOWEST PAIN SEVERITY IN PAST 24 HOURS: 2/10
MUSCLE WEAKNESS: 1
ARTHRALGIAS: 1
PAIN LOCATION - PAIN SEVERITY: 5/10
PAIN: PATIENT DILIGENT WITH PAIN CONTROL TECHNIQUES
PAIN SEVERITY GOAL: 2/10
HIGHEST PAIN SEVERITY IN PAST 24 HOURS: 5/10
PAIN: 1
SUBJECTIVE PAIN PROGRESSION: GRADUALLY IMPROVING
LOSS OF SENSATION IN FEET: 1
LIMITED RANGE OF MOTION: 1
PERSON REPORTING PAIN: PATIENT
PAIN LOCATION - RELIEVING FACTORS: REST, ELEVATION
PAIN LOCATION - PAIN FREQUENCY: INTERMITTENT
OCCASIONAL FEELINGS OF UNSTEADINESS: 1
DEPRESSION: 0
PAIN LOCATION: RIGHT FOOT

## 2024-01-22 NOTE — DISCHARGE SUMMARY
Discharge Diagnosis  Status post right hallux amputation, bone biopsy  Right foot ulceration infection MSSA, Candida glabrata -- Group B streptococcus  Hypertension  Hyperlipidemia  Obesity  Type 2 diabetes mellitus, hyperglycemia  Hypoalbuminemia  Normocytic anemia  Pyuria UTI Group B streptococcus - normal breanna  Acute kidney injury improved    Discharge Meds     Your medication list        START taking these medications        Instructions Last Dose Given Next Dose Due   acetaminophen 325 mg tablet  Commonly known as: Tylenol      Take 2 tablets (650 mg) by mouth every 4 hours if needed for mild pain (1 - 3).       cefTRIAXone 2 gram/50 mL IV  Commonly known as: Rocephin      Infuse 50 mL (2 g) at 100 mL/hr over 30 minutes into a venous catheter once every 24 hours for 37 doses.       meclizine 25 mg tablet,chewable  Commonly known as: Antivert      Chew 1 tablet (25 mg) 3 times a day as needed (vertigo).              CHANGE how you take these medications        Instructions Last Dose Given Next Dose Due   dicyclomine 10 mg capsule  Commonly known as: Bentyl  What changed: See the new instructions.      Take 1 capsule (10 mg) by mouth 3 times a day as needed (Abdominal pain) for up to 7 days.       Lantus U-100 Insulin 100 unit/mL injection  Generic drug: insulin glargine  What changed: Another medication with the same name was removed. Continue taking this medication, and follow the directions you see here.           magnesium oxide 400 mg (241.3 mg magnesium) tablet  Commonly known as: Mag-Ox  Start taking on: January 19, 2024  What changed: when to take this      Take 1 tablet (400 mg) by mouth once daily. Do not start before January 19, 2024.              CONTINUE taking these medications        Instructions Last Dose Given Next Dose Due   amLODIPine 10 mg tablet  Commonly known as: Norvasc      Take 1 tablet (10 mg) by mouth once daily.       aspirin 81 mg chewable tablet           atorvastatin 40 mg  tablet  Commonly known as: Lipitor      Take 1 tablet (40 mg) by mouth once daily.       cholecalciferol 50 mcg (2,000 unit) capsule  Commonly known as: Vitamin D-3           cloNIDine 0.1 mg tablet  Commonly known as: Catapres      Take 1 tablet (0.1 mg) by mouth once daily.       gabapentin 300 mg capsule  Commonly known as: Neurontin           insulin lispro 100 unit/mL injection  Commonly known as: HumaLOG           multivitamin with minerals tablet           OneTouch Delica Plus Lancet 30 gauge misc  Generic drug: lancets           OneTouch Ultra2 Meter misc  Generic drug: blood-glucose meter           OneTouch Verio test strips strip  Generic drug: blood sugar diagnostic                  STOP taking these medications      empagliflozin 25 mg  Commonly known as: Jardiance        lisinopril 40 mg tablet        metFORMIN 1,000 mg tablet  Commonly known as: Glucophage                  Where to Get Your Medications        These medications were sent to [id number] - [place], [place] - [address] [facility]  [address] [facility] [address]      Phone: [phone]   amLODIPine 10 mg tablet       You can get these medications from any pharmacy    Bring a paper prescription for each of these medications  cefTRIAXone 2 gram/50 mL IV       Information about where to get these medications is not yet available    Ask your nurse or doctor about these medications  acetaminophen 325 mg tablet  cloNIDine 0.1 mg tablet  dicyclomine 10 mg capsule  magnesium oxide 400 mg (241.3 mg magnesium) tablet         Test Results Pending At Discharge  Pending Labs       Order Current Status    Fungal Culture/Smear Collected (01/12/24 1330)    AFB Culture/Smear Preliminary result    AFB Culture/Smear Preliminary result    Fungal Culture/Smear Preliminary result          Hospital Course  This is a very pleasant 58-year-old -American female who presented to the emergency department with a right great  toe infection.  In the emergency department, initial workup was done.  She was treated with broad-spectrum IV antibiotics.  She was admitted.  She was evaluated and treated by Infectious disease and Podiatry.  She was treated with IV antibiotics and local care.  She underwent vascular studies which showed no evidence of arterial occlusive disease.  She underwent CT of the right foot.  She developed acute kidney injury multifactorial.  She was evaluated and treated by Nephrology.  She underwent a right hallux amputation with bone biopsy.  She was treated with IV antibiotics per Infectious disease and wound care per Podiatry.  1/9/2024 2/2 blood cultures without growth.  1/10/2024 wound culture grew MSSA and Candida glabrata.  Urine culture grew Group B streptococcus.  1/11/2024 urine culture normal breanna.  1/12/2024 intra-operative cultures grew rare Group B streptococcus.  A luis fernando catheter was placed for outpatient antibiotics.  She was cleared by Infectious disease for discharge on IV Ceftriaxone 2 grams every 24 hours for approximately 6 weeks of therapy ending 2/21/2024.  She was cleared by Podiatry for discharge with outpatient follow up.  She was cleared by Nephrology.  She was evaluated by physical and occupational therapy, with activity per Podiatry.  Her condition improved.  Case management was consulted for discharge planning.  Home health care was arranged.  She was discharged home in stable condition.    Pertinent Physical Exam At Time of Discharge  See physical examination    Outpatient Follow-Up  Future Appointments   Date Time Provider Department Center   1/22/2024  1:00 PM Zeinab Pisano [facility]   1/22/2024  1:30 PM Tammi Syed, WIL[facility]   1/22/2024  3:00 PM Enzo Kemp, PT [facility]   1/25/2024  9:30 AM Mone Aquino MD [id number] [facility]   1/25/2024 To Be Determined Suzanne Adam RN [facility]   2/1/2024 To Be Determined Suzanne Adam RN [facility]   3/20/2024 10:15 AM Betty  ERIBERTO Bartlett, GERHARD [id number] [employer]     Follow up with Podiatry as advised    Follow up with primary care provider in 1 week    Celeste Hill, TAL-CNP

## 2024-01-23 ENCOUNTER — HOME CARE VISIT (OUTPATIENT)
Dept: HOME HEALTH SERVICES | Facility: HOME HEALTH | Age: 59
End: 2024-01-23
Payer: COMMERCIAL

## 2024-01-23 ENCOUNTER — LAB REQUISITION (OUTPATIENT)
Dept: LAB | Facility: LAB | Age: 59
End: 2024-01-23
Payer: COMMERCIAL

## 2024-01-23 VITALS
RESPIRATION RATE: 16 BRPM | SYSTOLIC BLOOD PRESSURE: 140 MMHG | TEMPERATURE: 97.6 F | DIASTOLIC BLOOD PRESSURE: 70 MMHG | OXYGEN SATURATION: 96 % | HEART RATE: 86 BPM

## 2024-01-23 DIAGNOSIS — M86.171 OTHER ACUTE OSTEOMYELITIS, RIGHT ANKLE AND FOOT (MULTI): ICD-10-CM

## 2024-01-23 LAB
ALBUMIN SERPL BCP-MCNC: 4.1 G/DL (ref 3.4–5)
ALP SERPL-CCNC: 67 U/L (ref 33–110)
ALT SERPL W P-5'-P-CCNC: 16 U/L (ref 7–45)
ANION GAP SERPL CALC-SCNC: 15 MMOL/L (ref 10–20)
AST SERPL W P-5'-P-CCNC: 17 U/L (ref 9–39)
BASOPHILS # BLD AUTO: 0.07 X10*3/UL (ref 0–0.1)
BASOPHILS NFR BLD AUTO: 0.6 %
BILIRUB SERPL-MCNC: 0.3 MG/DL (ref 0–1.2)
BUN SERPL-MCNC: 31 MG/DL (ref 6–23)
CALCIUM SERPL-MCNC: 10 MG/DL (ref 8.6–10.6)
CHLORIDE SERPL-SCNC: 102 MMOL/L (ref 98–107)
CO2 SERPL-SCNC: 27 MMOL/L (ref 21–32)
CREAT SERPL-MCNC: 1.42 MG/DL (ref 0.5–1.05)
CRP SERPL-MCNC: 0.21 MG/DL
EGFRCR SERPLBLD CKD-EPI 2021: 43 ML/MIN/1.73M*2
EOSINOPHIL # BLD AUTO: 0.04 X10*3/UL (ref 0–0.7)
EOSINOPHIL NFR BLD AUTO: 0.4 %
ERYTHROCYTE [DISTWIDTH] IN BLOOD BY AUTOMATED COUNT: 13.2 % (ref 11.5–14.5)
ERYTHROCYTE [SEDIMENTATION RATE] IN BLOOD BY WESTERGREN METHOD: 59 MM/H (ref 0–30)
GLUCOSE SERPL-MCNC: 193 MG/DL (ref 74–99)
HCT VFR BLD AUTO: 27.7 % (ref 36–46)
HGB BLD-MCNC: 9.1 G/DL (ref 12–16)
IMM GRANULOCYTES # BLD AUTO: 0.04 X10*3/UL (ref 0–0.7)
IMM GRANULOCYTES NFR BLD AUTO: 0.4 % (ref 0–0.9)
LYMPHOCYTES # BLD AUTO: 1.99 X10*3/UL (ref 1.2–4.8)
LYMPHOCYTES NFR BLD AUTO: 18.4 %
MCH RBC QN AUTO: 31.5 PG (ref 26–34)
MCHC RBC AUTO-ENTMCNC: 32.9 G/DL (ref 32–36)
MCV RBC AUTO: 96 FL (ref 80–100)
MONOCYTES # BLD AUTO: 0.75 X10*3/UL (ref 0.1–1)
MONOCYTES NFR BLD AUTO: 6.9 %
NEUTROPHILS # BLD AUTO: 7.95 X10*3/UL (ref 1.2–7.7)
NEUTROPHILS NFR BLD AUTO: 73.3 %
NRBC BLD-RTO: 0 /100 WBCS (ref 0–0)
PLATELET # BLD AUTO: 463 X10*3/UL (ref 150–450)
POTASSIUM SERPL-SCNC: 4.1 MMOL/L (ref 3.5–5.3)
PROT SERPL-MCNC: 7.4 G/DL (ref 6.4–8.2)
RBC # BLD AUTO: 2.89 X10*6/UL (ref 4–5.2)
SODIUM SERPL-SCNC: 140 MMOL/L (ref 136–145)
WBC # BLD AUTO: 10.8 X10*3/UL (ref 4.4–11.3)

## 2024-01-23 PROCEDURE — 85652 RBC SED RATE AUTOMATED: CPT

## 2024-01-23 PROCEDURE — 85025 COMPLETE CBC W/AUTO DIFF WBC: CPT

## 2024-01-23 PROCEDURE — 86140 C-REACTIVE PROTEIN: CPT

## 2024-01-23 PROCEDURE — G0299 HHS/HOSPICE OF RN EA 15 MIN: HCPCS

## 2024-01-23 PROCEDURE — 80053 COMPREHEN METABOLIC PANEL: CPT

## 2024-01-23 SDOH — ECONOMIC STABILITY: HOUSING INSECURITY: HOME SAFETY: OT PROVIDED RECOMMENDATIONS FOR GRAB BAR INSTALLATION IN BATHROOM

## 2024-01-23 ASSESSMENT — ENCOUNTER SYMPTOMS
DEPRESSION: 0
PAIN LOCATION - PAIN SEVERITY: 5/10
HIGHEST PAIN SEVERITY IN PAST 24 HOURS: 5/10
SUBJECTIVE PAIN PROGRESSION: GRADUALLY IMPROVING
APPETITE LEVEL: GOOD
LOSS OF SENSATION IN FEET: 1
DENIES PAIN: 1
PAIN SEVERITY GOAL: 0/10
LOWEST PAIN SEVERITY IN PAST 24 HOURS: 2/10
DEPRESSION: 0
CHANGE IN APPETITE: UNCHANGED
PAIN LOCATION: RIGHT FOOT
APPETITE LEVEL: GOOD
OCCASIONAL FEELINGS OF UNSTEADINESS: 0
OCCASIONAL FEELINGS OF UNSTEADINESS: 1
LOSS OF SENSATION IN FEET: 1

## 2024-01-23 ASSESSMENT — PAIN SCALES - PAIN ASSESSMENT IN ADVANCED DEMENTIA (PAINAD)
FACIALEXPRESSION: 0 - SMILING OR INEXPRESSIVE.
NEGVOCALIZATION: 0
BODYLANGUAGE: 0 - RELAXED.
NEGVOCALIZATION: 0 - NONE.
CONSOLABILITY: 0 - NO NEED TO CONSOLE.
BODYLANGUAGE: 0
FACIALEXPRESSION: 0
CONSOLABILITY: 0
BREATHING: 0
TOTALSCORE: 0

## 2024-01-23 ASSESSMENT — ACTIVITIES OF DAILY LIVING (ADL)
DRESSING_UB_CURRENT_FUNCTION: STAND BY ASSIST
DRESSING_LB_CURRENT_FUNCTION: CONTACT GUARD ASSIST
BATHING ASSESSED: 1
BATHING_CURRENT_FUNCTION: MINIMUM ASSIST

## 2024-01-24 ENCOUNTER — HOSPITAL ENCOUNTER (INPATIENT)
Facility: HOSPITAL | Age: 59
LOS: 6 days | Discharge: HOME | DRG: 186 | End: 2024-01-31
Attending: EMERGENCY MEDICINE | Admitting: INTERNAL MEDICINE
Payer: COMMERCIAL

## 2024-01-24 ENCOUNTER — HOME INFUSION (OUTPATIENT)
Dept: INFUSION THERAPY | Age: 59
End: 2024-01-24
Payer: COMMERCIAL

## 2024-01-24 ENCOUNTER — APPOINTMENT (OUTPATIENT)
Dept: RADIOLOGY | Facility: HOSPITAL | Age: 59
DRG: 186 | End: 2024-01-24
Payer: COMMERCIAL

## 2024-01-24 ENCOUNTER — CLINICAL SUPPORT (OUTPATIENT)
Dept: EMERGENCY MEDICINE | Facility: HOSPITAL | Age: 59
DRG: 186 | End: 2024-01-24
Payer: COMMERCIAL

## 2024-01-24 DIAGNOSIS — R06.02 SHORTNESS OF BREATH: ICD-10-CM

## 2024-01-24 DIAGNOSIS — L08.9 RIGHT FOOT INFECTION: ICD-10-CM

## 2024-01-24 DIAGNOSIS — M79.89 LEG SWELLING: ICD-10-CM

## 2024-01-24 DIAGNOSIS — J81.0 ACUTE PULMONARY EDEMA (MULTI): ICD-10-CM

## 2024-01-24 DIAGNOSIS — J90 PLEURAL EFFUSION: ICD-10-CM

## 2024-01-24 DIAGNOSIS — J18.9 PNEUMONIA DUE TO INFECTIOUS ORGANISM, UNSPECIFIED LATERALITY, UNSPECIFIED PART OF LUNG: ICD-10-CM

## 2024-01-24 DIAGNOSIS — I38: ICD-10-CM

## 2024-01-24 DIAGNOSIS — I10 BENIGN ESSENTIAL HYPERTENSION: ICD-10-CM

## 2024-01-24 DIAGNOSIS — I50.31: ICD-10-CM

## 2024-01-24 DIAGNOSIS — Z79.4 TYPE 2 DIABETES MELLITUS WITH RIGHT EYE AFFECTED BY PROLIFERATIVE RETINOPATHY AND MACULAR EDEMA, WITH LONG-TERM CURRENT USE OF INSULIN (MULTI): ICD-10-CM

## 2024-01-24 DIAGNOSIS — R09.02 HYPOXIA: ICD-10-CM

## 2024-01-24 DIAGNOSIS — E11.3511 TYPE 2 DIABETES MELLITUS WITH RIGHT EYE AFFECTED BY PROLIFERATIVE RETINOPATHY AND MACULAR EDEMA, WITH LONG-TERM CURRENT USE OF INSULIN (MULTI): ICD-10-CM

## 2024-01-24 DIAGNOSIS — J96.01 ACUTE RESPIRATORY FAILURE WITH HYPOXIA (MULTI): Primary | ICD-10-CM

## 2024-01-24 LAB
ALBUMIN SERPL BCP-MCNC: 3.4 G/DL (ref 3.4–5)
ALP SERPL-CCNC: 135 U/L (ref 33–110)
ALT SERPL W P-5'-P-CCNC: 25 U/L (ref 7–45)
ANION GAP BLDV CALCULATED.4IONS-SCNC: 18 MMOL/L (ref 10–25)
ANION GAP SERPL CALC-SCNC: 19 MMOL/L (ref 10–20)
APTT PPP: 35 SECONDS (ref 27–38)
AST SERPL W P-5'-P-CCNC: 28 U/L (ref 9–39)
ATRIAL RATE: 102 BPM
ATRIAL RATE: 83 BPM
BASE EXCESS BLDV CALC-SCNC: -0.8 MMOL/L (ref -2–3)
BASOPHILS # BLD AUTO: 0.06 X10*3/UL (ref 0–0.1)
BASOPHILS NFR BLD AUTO: 0.6 %
BILIRUB SERPL-MCNC: 0.4 MG/DL (ref 0–1.2)
BNP SERPL-MCNC: 311 PG/ML (ref 0–99)
BODY TEMPERATURE: 37 DEGREES CELSIUS
BUN SERPL-MCNC: 19 MG/DL (ref 6–23)
CA-I BLDV-SCNC: 1.2 MMOL/L (ref 1.1–1.33)
CALCIUM SERPL-MCNC: 9.1 MG/DL (ref 8.6–10.6)
CARDIAC TROPONIN I PNL SERPL HS: 25 NG/L (ref 0–34)
CHLORIDE BLDV-SCNC: 100 MMOL/L (ref 98–107)
CHLORIDE SERPL-SCNC: 100 MMOL/L (ref 98–107)
CO2 SERPL-SCNC: 22 MMOL/L (ref 21–32)
CREAT SERPL-MCNC: 1.48 MG/DL (ref 0.5–1.05)
EGFRCR SERPLBLD CKD-EPI 2021: 41 ML/MIN/1.73M*2
EOSINOPHIL # BLD AUTO: 0.01 X10*3/UL (ref 0–0.7)
EOSINOPHIL NFR BLD AUTO: 0.1 %
ERYTHROCYTE [DISTWIDTH] IN BLOOD BY AUTOMATED COUNT: 13.2 % (ref 11.5–14.5)
FLUAV RNA RESP QL NAA+PROBE: NOT DETECTED
FLUBV RNA RESP QL NAA+PROBE: NOT DETECTED
GLUCOSE BLDV-MCNC: 394 MG/DL (ref 74–99)
GLUCOSE SERPL-MCNC: 346 MG/DL (ref 74–99)
HCO3 BLDV-SCNC: 24.3 MMOL/L (ref 22–26)
HCT VFR BLD AUTO: 27.3 % (ref 36–46)
HCT VFR BLD EST: 29 % (ref 36–46)
HGB BLD-MCNC: 9.2 G/DL (ref 12–16)
HGB BLDV-MCNC: 9.6 G/DL (ref 12–16)
HOLD SPECIMEN: NORMAL
HOLD SPECIMEN: NORMAL
IMM GRANULOCYTES # BLD AUTO: 0.07 X10*3/UL (ref 0–0.7)
IMM GRANULOCYTES NFR BLD AUTO: 0.7 % (ref 0–0.9)
INR PPP: 1.2 (ref 0.9–1.1)
LACTATE BLDV-SCNC: 2.3 MMOL/L (ref 0.4–2)
LYMPHOCYTES # BLD AUTO: 1.67 X10*3/UL (ref 1.2–4.8)
LYMPHOCYTES NFR BLD AUTO: 16.8 %
MCH RBC QN AUTO: 30.2 PG (ref 26–34)
MCHC RBC AUTO-ENTMCNC: 33.7 G/DL (ref 32–36)
MCV RBC AUTO: 90 FL (ref 80–100)
MONOCYTES # BLD AUTO: 0.48 X10*3/UL (ref 0.1–1)
MONOCYTES NFR BLD AUTO: 4.8 %
NEUTROPHILS # BLD AUTO: 7.63 X10*3/UL (ref 1.2–7.7)
NEUTROPHILS NFR BLD AUTO: 77 %
NRBC BLD-RTO: 0 /100 WBCS (ref 0–0)
OXYHGB MFR BLDV: 26.2 % (ref 45–75)
P AXIS: 51 DEGREES
P AXIS: 96 DEGREES
P OFFSET: 205 MS
P OFFSET: 209 MS
P ONSET: 152 MS
P ONSET: 163 MS
PCO2 BLDV: 41 MM HG (ref 41–51)
PH BLDV: 7.38 PH (ref 7.33–7.43)
PLATELET # BLD AUTO: 447 X10*3/UL (ref 150–450)
PO2 BLDV: 29 MM HG (ref 35–45)
POTASSIUM BLDV-SCNC: 5 MMOL/L (ref 3.5–5.3)
POTASSIUM SERPL-SCNC: 4.5 MMOL/L (ref 3.5–5.3)
PR INTERVAL: 118 MS
PR INTERVAL: 132 MS
PROT SERPL-MCNC: 7.7 G/DL (ref 6.4–8.2)
PROTHROMBIN TIME: 13.7 SECONDS (ref 9.8–12.8)
Q ONSET: 218 MS
Q ONSET: 222 MS
QRS COUNT: 14 BEATS
QRS COUNT: 17 BEATS
QRS DURATION: 74 MS
QRS DURATION: 90 MS
QT INTERVAL: 342 MS
QT INTERVAL: 376 MS
QTC CALCULATION(BAZETT): 441 MS
QTC CALCULATION(BAZETT): 445 MS
QTC FREDERICIA: 408 MS
QTC FREDERICIA: 419 MS
R AXIS: 119 DEGREES
R AXIS: 40 DEGREES
RBC # BLD AUTO: 3.05 X10*6/UL (ref 4–5.2)
SAO2 % BLDV: 27 % (ref 45–75)
SARS-COV-2 RNA RESP QL NAA+PROBE: NOT DETECTED
SODIUM BLDV-SCNC: 137 MMOL/L (ref 136–145)
SODIUM SERPL-SCNC: 136 MMOL/L (ref 136–145)
T AXIS: 17 DEGREES
T AXIS: 25 DEGREES
T OFFSET: 393 MS
T OFFSET: 406 MS
VENTRICULAR RATE: 102 BPM
VENTRICULAR RATE: 83 BPM
WBC # BLD AUTO: 9.9 X10*3/UL (ref 4.4–11.3)

## 2024-01-24 PROCEDURE — 85610 PROTHROMBIN TIME: CPT | Performed by: EMERGENCY MEDICINE

## 2024-01-24 PROCEDURE — 84132 ASSAY OF SERUM POTASSIUM: CPT

## 2024-01-24 PROCEDURE — 94660 CPAP INITIATION&MGMT: CPT

## 2024-01-24 PROCEDURE — 71045 X-RAY EXAM CHEST 1 VIEW: CPT | Performed by: STUDENT IN AN ORGANIZED HEALTH CARE EDUCATION/TRAINING PROGRAM

## 2024-01-24 PROCEDURE — 93308 TTE F-UP OR LMTD: CPT | Performed by: EMERGENCY MEDICINE

## 2024-01-24 PROCEDURE — 94762 N-INVAS EAR/PLS OXIMTRY CONT: CPT | Mod: MUE

## 2024-01-24 PROCEDURE — 76604 US EXAM CHEST: CPT | Performed by: EMERGENCY MEDICINE

## 2024-01-24 PROCEDURE — 84132 ASSAY OF SERUM POTASSIUM: CPT | Performed by: EMERGENCY MEDICINE

## 2024-01-24 PROCEDURE — 99291 CRITICAL CARE FIRST HOUR: CPT | Performed by: EMERGENCY MEDICINE

## 2024-01-24 PROCEDURE — 93005 ELECTROCARDIOGRAM TRACING: CPT

## 2024-01-24 PROCEDURE — 99291 CRITICAL CARE FIRST HOUR: CPT | Performed by: NURSE PRACTITIONER

## 2024-01-24 PROCEDURE — 93010 ELECTROCARDIOGRAM REPORT: CPT | Performed by: EMERGENCY MEDICINE

## 2024-01-24 PROCEDURE — 71045 X-RAY EXAM CHEST 1 VIEW: CPT

## 2024-01-24 PROCEDURE — 83036 HEMOGLOBIN GLYCOSYLATED A1C: CPT

## 2024-01-24 PROCEDURE — 84484 ASSAY OF TROPONIN QUANT: CPT | Performed by: EMERGENCY MEDICINE

## 2024-01-24 PROCEDURE — 85025 COMPLETE CBC W/AUTO DIFF WBC: CPT | Performed by: EMERGENCY MEDICINE

## 2024-01-24 PROCEDURE — 87636 SARSCOV2 & INF A&B AMP PRB: CPT | Performed by: EMERGENCY MEDICINE

## 2024-01-24 PROCEDURE — 83880 ASSAY OF NATRIURETIC PEPTIDE: CPT | Performed by: EMERGENCY MEDICINE

## 2024-01-24 PROCEDURE — 2500000005 HC RX 250 GENERAL PHARMACY W/O HCPCS: Performed by: EMERGENCY MEDICINE

## 2024-01-24 RX ADMIN — Medication 60 L/MIN: at 20:35

## 2024-01-24 NOTE — PROGRESS NOTES
Chart review: patient receiving ceftriaxone 2gm q24 thru 2/21 for right foot infection. Dr Hickey following.    Labs from 1/23 reviewed - creatinine elevated at 1.42 but trending down    Rph call to patient, no answer and vm full so unable to leave message. Per notes patient should have medication in home thru 1/25. Pharmacy will deliver 1/25 of another week of medication and supplies.    Pharmacy to mix and send straight 1/25 with supplies to match:  7x ceftriaxone 2gm MB+  DOS 1/26 - 2/1    NF 2/1 straight, check labs, check progress

## 2024-01-25 ENCOUNTER — APPOINTMENT (OUTPATIENT)
Dept: CARDIOLOGY | Facility: HOSPITAL | Age: 59
DRG: 186 | End: 2024-01-25
Payer: COMMERCIAL

## 2024-01-25 ENCOUNTER — HOME CARE VISIT (OUTPATIENT)
Dept: HOME HEALTH SERVICES | Facility: HOME HEALTH | Age: 59
End: 2024-01-25
Payer: COMMERCIAL

## 2024-01-25 ENCOUNTER — APPOINTMENT (OUTPATIENT)
Dept: WOUND CARE | Facility: HOSPITAL | Age: 59
End: 2024-01-25
Payer: COMMERCIAL

## 2024-01-25 ENCOUNTER — APPOINTMENT (OUTPATIENT)
Dept: RADIOLOGY | Facility: HOSPITAL | Age: 59
DRG: 186 | End: 2024-01-25
Payer: COMMERCIAL

## 2024-01-25 PROBLEM — R06.02 SHORTNESS OF BREATH: Status: ACTIVE | Noted: 2024-01-25

## 2024-01-25 LAB
ALBUMIN SERPL BCP-MCNC: 2.8 G/DL (ref 3.4–5)
ANION GAP SERPL CALC-SCNC: 12 MMOL/L (ref 10–20)
AORTIC VALVE MEAN GRADIENT: 5 MMHG
AORTIC VALVE PEAK VELOCITY: 1.58 M/S
AV PEAK GRADIENT: 10 MMHG
AVA (PEAK VEL): 2.35 CM2
AVA (VTI): 2.42 CM2
BUN SERPL-MCNC: 19 MG/DL (ref 6–23)
CALCIUM SERPL-MCNC: 8.6 MG/DL (ref 8.6–10.6)
CHLORIDE SERPL-SCNC: 103 MMOL/L (ref 98–107)
CO2 SERPL-SCNC: 30 MMOL/L (ref 21–32)
CREAT SERPL-MCNC: 1.29 MG/DL (ref 0.5–1.05)
EGFRCR SERPLBLD CKD-EPI 2021: 48 ML/MIN/1.73M*2
EST. AVERAGE GLUCOSE BLD GHB EST-MCNC: 329 MG/DL
GLUCOSE BLD MANUAL STRIP-MCNC: 162 MG/DL (ref 74–99)
GLUCOSE BLD MANUAL STRIP-MCNC: 171 MG/DL (ref 74–99)
GLUCOSE BLD MANUAL STRIP-MCNC: 174 MG/DL (ref 74–99)
GLUCOSE BLD MANUAL STRIP-MCNC: 183 MG/DL (ref 74–99)
GLUCOSE BLD MANUAL STRIP-MCNC: 202 MG/DL (ref 74–99)
GLUCOSE BLD MANUAL STRIP-MCNC: 217 MG/DL (ref 74–99)
GLUCOSE BLD MANUAL STRIP-MCNC: 222 MG/DL (ref 74–99)
GLUCOSE SERPL-MCNC: 174 MG/DL (ref 74–99)
HBA1C MFR BLD: 13.1 %
LACTATE BLDV-SCNC: 0.9 MMOL/L (ref 0.4–2)
LEFT ATRIUM VOLUME AREA LENGTH INDEX BSA: 50.5 ML/M2
LEFT VENTRICLE INTERNAL DIMENSION DIASTOLE: 4.35 CM (ref 3.5–6)
LEFT VENTRICULAR OUTFLOW TRACT DIAMETER: 1.9 CM
LEGIONELLA AG UR QL: NEGATIVE
MAGNESIUM SERPL-MCNC: 1.75 MG/DL (ref 1.6–2.4)
MITRAL VALVE E/A RATIO: 0.8
MITRAL VALVE E/E' RATIO: 10.94
PHOSPHATE SERPL-MCNC: 3.4 MG/DL (ref 2.5–4.9)
POTASSIUM SERPL-SCNC: 4 MMOL/L (ref 3.5–5.3)
PROCALCITONIN SERPL-MCNC: 0.14 NG/ML
RIGHT VENTRICLE FREE WALL PEAK S': 9.57 CM/S
RIGHT VENTRICLE PEAK SYSTOLIC PRESSURE: 32.4 MMHG
S PNEUM AG UR QL: NEGATIVE
SODIUM SERPL-SCNC: 141 MMOL/L (ref 136–145)
TRICUSPID ANNULAR PLANE SYSTOLIC EXCURSION: 2 CM

## 2024-01-25 PROCEDURE — 83605 ASSAY OF LACTIC ACID: CPT

## 2024-01-25 PROCEDURE — 2020000001 HC ICU ROOM DAILY

## 2024-01-25 PROCEDURE — 82947 ASSAY GLUCOSE BLOOD QUANT: CPT

## 2024-01-25 PROCEDURE — 84100 ASSAY OF PHOSPHORUS: CPT

## 2024-01-25 PROCEDURE — 2500000004 HC RX 250 GENERAL PHARMACY W/ HCPCS (ALT 636 FOR OP/ED)

## 2024-01-25 PROCEDURE — 99291 CRITICAL CARE FIRST HOUR: CPT

## 2024-01-25 PROCEDURE — 37799 UNLISTED PX VASCULAR SURGERY: CPT

## 2024-01-25 PROCEDURE — 83735 ASSAY OF MAGNESIUM: CPT

## 2024-01-25 PROCEDURE — 93005 ELECTROCARDIOGRAM TRACING: CPT

## 2024-01-25 PROCEDURE — 36415 COLL VENOUS BLD VENIPUNCTURE: CPT | Performed by: NURSE PRACTITIONER

## 2024-01-25 PROCEDURE — 84145 PROCALCITONIN (PCT): CPT

## 2024-01-25 PROCEDURE — 87081 CULTURE SCREEN ONLY: CPT

## 2024-01-25 PROCEDURE — 87040 BLOOD CULTURE FOR BACTERIA: CPT | Mod: 91 | Performed by: NURSE PRACTITIONER

## 2024-01-25 PROCEDURE — 93306 TTE W/DOPPLER COMPLETE: CPT

## 2024-01-25 PROCEDURE — 71275 CT ANGIOGRAPHY CHEST: CPT | Performed by: RADIOLOGY

## 2024-01-25 PROCEDURE — 93010 ELECTROCARDIOGRAM REPORT: CPT | Performed by: INTERNAL MEDICINE

## 2024-01-25 PROCEDURE — 82947 ASSAY GLUCOSE BLOOD QUANT: CPT | Mod: 91

## 2024-01-25 PROCEDURE — 94799 UNLISTED PULMONARY SVC/PX: CPT

## 2024-01-25 PROCEDURE — 36415 COLL VENOUS BLD VENIPUNCTURE: CPT

## 2024-01-25 PROCEDURE — 2550000001 HC RX 255 CONTRASTS: Performed by: EMERGENCY MEDICINE

## 2024-01-25 PROCEDURE — 87449 NOS EACH ORGANISM AG IA: CPT

## 2024-01-25 PROCEDURE — 2500000002 HC RX 250 W HCPCS SELF ADMINISTERED DRUGS (ALT 637 FOR MEDICARE OP, ALT 636 FOR OP/ED)

## 2024-01-25 PROCEDURE — 94762 N-INVAS EAR/PLS OXIMTRY CONT: CPT

## 2024-01-25 PROCEDURE — 71275 CT ANGIOGRAPHY CHEST: CPT

## 2024-01-25 PROCEDURE — 93306 TTE W/DOPPLER COMPLETE: CPT | Performed by: INTERNAL MEDICINE

## 2024-01-25 PROCEDURE — 87899 AGENT NOS ASSAY W/OPTIC: CPT

## 2024-01-25 PROCEDURE — 2500000001 HC RX 250 WO HCPCS SELF ADMINISTERED DRUGS (ALT 637 FOR MEDICARE OP)

## 2024-01-25 RX ORDER — FUROSEMIDE 10 MG/ML
40 INJECTION INTRAMUSCULAR; INTRAVENOUS ONCE
Status: COMPLETED | OUTPATIENT
Start: 2024-01-25 | End: 2024-01-25

## 2024-01-25 RX ORDER — AMLODIPINE BESYLATE 10 MG/1
10 TABLET ORAL DAILY
Status: DISCONTINUED | OUTPATIENT
Start: 2024-01-25 | End: 2024-01-31 | Stop reason: HOSPADM

## 2024-01-25 RX ORDER — NAPROXEN SODIUM 220 MG/1
81 TABLET, FILM COATED ORAL
Status: DISCONTINUED | OUTPATIENT
Start: 2024-01-25 | End: 2024-01-31 | Stop reason: HOSPADM

## 2024-01-25 RX ORDER — DEXTROSE 50 % IN WATER (D50W) INTRAVENOUS SYRINGE
25
Status: DISCONTINUED | OUTPATIENT
Start: 2024-01-25 | End: 2024-01-31 | Stop reason: HOSPADM

## 2024-01-25 RX ORDER — INSULIN GLARGINE 100 [IU]/ML
35 INJECTION, SOLUTION SUBCUTANEOUS NIGHTLY
Status: DISCONTINUED | OUTPATIENT
Start: 2024-01-25 | End: 2024-01-26

## 2024-01-25 RX ORDER — VANCOMYCIN HYDROCHLORIDE 1 G/200ML
1000 INJECTION, SOLUTION INTRAVENOUS EVERY 24 HOURS
Status: DISCONTINUED | OUTPATIENT
Start: 2024-01-25 | End: 2024-01-25

## 2024-01-25 RX ORDER — LABETALOL HYDROCHLORIDE 5 MG/ML
INJECTION, SOLUTION INTRAVENOUS
Status: DISPENSED
Start: 2024-01-25 | End: 2024-01-25

## 2024-01-25 RX ORDER — LISINOPRIL 40 MG/1
40 TABLET ORAL DAILY
COMMUNITY
End: 2024-01-31 | Stop reason: HOSPADM

## 2024-01-25 RX ORDER — INSULIN LISPRO 100 [IU]/ML
0-5 INJECTION, SOLUTION INTRAVENOUS; SUBCUTANEOUS EVERY 4 HOURS
Status: DISCONTINUED | OUTPATIENT
Start: 2024-01-25 | End: 2024-01-26

## 2024-01-25 RX ORDER — MAGNESIUM SULFATE HEPTAHYDRATE 40 MG/ML
2 INJECTION, SOLUTION INTRAVENOUS ONCE
Status: COMPLETED | OUTPATIENT
Start: 2024-01-25 | End: 2024-01-25

## 2024-01-25 RX ORDER — METFORMIN HYDROCHLORIDE 500 MG/1
1 TABLET ORAL
COMMUNITY
End: 2024-02-20

## 2024-01-25 RX ORDER — DEXTROSE 50 % IN WATER (D50W) INTRAVENOUS SYRINGE
25
Status: DISCONTINUED | OUTPATIENT
Start: 2024-01-25 | End: 2024-01-25

## 2024-01-25 RX ORDER — ATORVASTATIN CALCIUM 40 MG/1
40 TABLET, FILM COATED ORAL DAILY
Status: DISCONTINUED | OUTPATIENT
Start: 2024-01-25 | End: 2024-01-31 | Stop reason: HOSPADM

## 2024-01-25 RX ORDER — LABETALOL HYDROCHLORIDE 5 MG/ML
10 INJECTION, SOLUTION INTRAVENOUS ONCE
Status: DISCONTINUED | OUTPATIENT
Start: 2024-01-25 | End: 2024-01-25

## 2024-01-25 RX ORDER — DEXTROSE MONOHYDRATE 100 MG/ML
0.3 INJECTION, SOLUTION INTRAVENOUS ONCE AS NEEDED
Status: DISCONTINUED | OUTPATIENT
Start: 2024-01-25 | End: 2024-01-31 | Stop reason: HOSPADM

## 2024-01-25 RX ORDER — HEPARIN SODIUM 5000 [USP'U]/ML
7500 INJECTION, SOLUTION INTRAVENOUS; SUBCUTANEOUS EVERY 8 HOURS SCHEDULED
Status: DISCONTINUED | OUTPATIENT
Start: 2024-01-25 | End: 2024-01-31 | Stop reason: HOSPADM

## 2024-01-25 RX ORDER — DEXTROSE MONOHYDRATE 100 MG/ML
0.3 INJECTION, SOLUTION INTRAVENOUS ONCE AS NEEDED
Status: DISCONTINUED | OUTPATIENT
Start: 2024-01-25 | End: 2024-01-25

## 2024-01-25 RX ORDER — CLONIDINE HYDROCHLORIDE 0.1 MG/1
0.1 TABLET ORAL DAILY
Status: DISCONTINUED | OUTPATIENT
Start: 2024-01-25 | End: 2024-01-29

## 2024-01-25 RX ORDER — GABAPENTIN 300 MG/1
300 CAPSULE ORAL 2 TIMES DAILY
Status: DISCONTINUED | OUTPATIENT
Start: 2024-01-25 | End: 2024-01-31 | Stop reason: HOSPADM

## 2024-01-25 RX ORDER — CEFTRIAXONE 2 G/50ML
2 INJECTION, SOLUTION INTRAVENOUS EVERY 24 HOURS
Status: DISCONTINUED | OUTPATIENT
Start: 2024-01-25 | End: 2024-01-31 | Stop reason: HOSPADM

## 2024-01-25 RX ADMIN — INSULIN LISPRO 1 UNITS: 100 INJECTION, SOLUTION INTRAVENOUS; SUBCUTANEOUS at 15:01

## 2024-01-25 RX ADMIN — IOHEXOL 88 ML: 350 INJECTION, SOLUTION INTRAVENOUS at 04:15

## 2024-01-25 RX ADMIN — CLONIDINE HYDROCHLORIDE 0.1 MG: 0.1 TABLET ORAL at 09:00

## 2024-01-25 RX ADMIN — HEPARIN SODIUM 7500 UNITS: 5000 INJECTION INTRAVENOUS; SUBCUTANEOUS at 14:58

## 2024-01-25 RX ADMIN — INSULIN LISPRO 2 UNITS: 100 INJECTION, SOLUTION INTRAVENOUS; SUBCUTANEOUS at 12:34

## 2024-01-25 RX ADMIN — INSULIN LISPRO 1 UNITS: 100 INJECTION, SOLUTION INTRAVENOUS; SUBCUTANEOUS at 23:45

## 2024-01-25 RX ADMIN — PERFLUTREN 10 ML OF DILUTION: 6.52 INJECTION, SUSPENSION INTRAVENOUS at 10:37

## 2024-01-25 RX ADMIN — HEPARIN SODIUM 7500 UNITS: 5000 INJECTION INTRAVENOUS; SUBCUTANEOUS at 20:50

## 2024-01-25 RX ADMIN — AMLODIPINE BESYLATE 10 MG: 10 TABLET ORAL at 09:00

## 2024-01-25 RX ADMIN — GABAPENTIN 300 MG: 300 CAPSULE ORAL at 09:19

## 2024-01-25 RX ADMIN — FUROSEMIDE 40 MG: 10 INJECTION, SOLUTION INTRAVENOUS at 08:39

## 2024-01-25 RX ADMIN — HEPARIN SODIUM 7500 UNITS: 5000 INJECTION INTRAVENOUS; SUBCUTANEOUS at 08:39

## 2024-01-25 RX ADMIN — ATORVASTATIN CALCIUM 40 MG: 40 TABLET, FILM COATED ORAL at 09:19

## 2024-01-25 RX ADMIN — INSULIN LISPRO 2 UNITS: 100 INJECTION, SOLUTION INTRAVENOUS; SUBCUTANEOUS at 08:39

## 2024-01-25 RX ADMIN — MAGNESIUM SULFATE HEPTAHYDRATE 2 G: 40 INJECTION, SOLUTION INTRAVENOUS at 17:04

## 2024-01-25 RX ADMIN — INSULIN LISPRO 1 UNITS: 100 INJECTION, SOLUTION INTRAVENOUS; SUBCUTANEOUS at 20:45

## 2024-01-25 RX ADMIN — INSULIN GLARGINE 35 UNITS: 100 INJECTION, SOLUTION SUBCUTANEOUS at 21:47

## 2024-01-25 RX ADMIN — CEFTRIAXONE SODIUM 2 G: 2 INJECTION, SOLUTION INTRAVENOUS at 10:32

## 2024-01-25 RX ADMIN — GABAPENTIN 300 MG: 300 CAPSULE ORAL at 20:45

## 2024-01-25 RX ADMIN — FUROSEMIDE 40 MG: 10 INJECTION, SOLUTION INTRAVENOUS at 17:04

## 2024-01-25 RX ADMIN — ASPIRIN 81 MG CHEWABLE TABLET 81 MG: 81 TABLET CHEWABLE at 10:32

## 2024-01-25 SDOH — ECONOMIC STABILITY: FOOD INSECURITY: WITHIN THE PAST 12 MONTHS, YOU WORRIED THAT YOUR FOOD WOULD RUN OUT BEFORE YOU GOT MONEY TO BUY MORE.: NEVER TRUE

## 2024-01-25 SDOH — SOCIAL STABILITY: SOCIAL INSECURITY: ARE THERE ANY APPARENT SIGNS OF INJURIES/BEHAVIORS THAT COULD BE RELATED TO ABUSE/NEGLECT?: NO

## 2024-01-25 SDOH — ECONOMIC STABILITY: INCOME INSECURITY: HOW HARD IS IT FOR YOU TO PAY FOR THE VERY BASICS LIKE FOOD, HOUSING, MEDICAL CARE, AND HEATING?: SOMEWHAT HARD

## 2024-01-25 SDOH — SOCIAL STABILITY: SOCIAL INSECURITY: ARE YOU OR HAVE YOU BEEN THREATENED OR ABUSED PHYSICALLY, EMOTIONALLY, OR SEXUALLY BY ANYONE?: NO

## 2024-01-25 SDOH — SOCIAL STABILITY: SOCIAL INSECURITY: HAVE YOU HAD THOUGHTS OF HARMING ANYONE ELSE?: NO

## 2024-01-25 SDOH — ECONOMIC STABILITY: HOUSING INSECURITY
IN THE LAST 12 MONTHS, WAS THERE A TIME WHEN YOU DID NOT HAVE A STEADY PLACE TO SLEEP OR SLEPT IN A SHELTER (INCLUDING NOW)?: NO

## 2024-01-25 SDOH — HEALTH STABILITY: MENTAL HEALTH
STRESS IS WHEN SOMEONE FEELS TENSE, NERVOUS, ANXIOUS, OR CAN'T SLEEP AT NIGHT BECAUSE THEIR MIND IS TROUBLED. HOW STRESSED ARE YOU?: ONLY A LITTLE

## 2024-01-25 SDOH — SOCIAL STABILITY: SOCIAL INSECURITY: HAS ANYONE EVER THREATENED TO HURT YOUR FAMILY OR YOUR PETS?: NO

## 2024-01-25 SDOH — ECONOMIC STABILITY: TRANSPORTATION INSECURITY
IN THE PAST 12 MONTHS, HAS LACK OF TRANSPORTATION KEPT YOU FROM MEETINGS, WORK, OR FROM GETTING THINGS NEEDED FOR DAILY LIVING?: NO

## 2024-01-25 SDOH — ECONOMIC STABILITY: INCOME INSECURITY: IN THE LAST 12 MONTHS, WAS THERE A TIME WHEN YOU WERE NOT ABLE TO PAY THE MORTGAGE OR RENT ON TIME?: NO

## 2024-01-25 SDOH — ECONOMIC STABILITY: FOOD INSECURITY: WITHIN THE PAST 12 MONTHS, THE FOOD YOU BOUGHT JUST DIDN'T LAST AND YOU DIDN'T HAVE MONEY TO GET MORE.: NEVER TRUE

## 2024-01-25 SDOH — ECONOMIC STABILITY: HOUSING INSECURITY: IN THE LAST 12 MONTHS, HOW MANY PLACES HAVE YOU LIVED?: 1

## 2024-01-25 SDOH — SOCIAL STABILITY: SOCIAL INSECURITY: DO YOU FEEL ANYONE HAS EXPLOITED OR TAKEN ADVANTAGE OF YOU FINANCIALLY OR OF YOUR PERSONAL PROPERTY?: NO

## 2024-01-25 SDOH — ECONOMIC STABILITY: TRANSPORTATION INSECURITY
IN THE PAST 12 MONTHS, HAS THE LACK OF TRANSPORTATION KEPT YOU FROM MEDICAL APPOINTMENTS OR FROM GETTING MEDICATIONS?: NO

## 2024-01-25 SDOH — SOCIAL STABILITY: SOCIAL INSECURITY: DO YOU FEEL UNSAFE GOING BACK TO THE PLACE WHERE YOU ARE LIVING?: NO

## 2024-01-25 SDOH — SOCIAL STABILITY: SOCIAL INSECURITY: WERE YOU ABLE TO COMPLETE ALL THE BEHAVIORAL HEALTH SCREENINGS?: YES

## 2024-01-25 SDOH — SOCIAL STABILITY: SOCIAL INSECURITY: DOES ANYONE TRY TO KEEP YOU FROM HAVING/CONTACTING OTHER FRIENDS OR DOING THINGS OUTSIDE YOUR HOME?: NO

## 2024-01-25 SDOH — SOCIAL STABILITY: SOCIAL INSECURITY: ABUSE: ADULT

## 2024-01-25 SDOH — HEALTH STABILITY: PHYSICAL HEALTH: ON AVERAGE, HOW MANY DAYS PER WEEK DO YOU ENGAGE IN MODERATE TO STRENUOUS EXERCISE (LIKE A BRISK WALK)?: 1 DAY

## 2024-01-25 SDOH — HEALTH STABILITY: PHYSICAL HEALTH: ON AVERAGE, HOW MANY MINUTES DO YOU ENGAGE IN EXERCISE AT THIS LEVEL?: 20 MIN

## 2024-01-25 ASSESSMENT — COGNITIVE AND FUNCTIONAL STATUS - GENERAL
HELP NEEDED FOR BATHING: A LITTLE
STANDING UP FROM CHAIR USING ARMS: A LITTLE
MOBILITY SCORE: 18
DRESSING REGULAR LOWER BODY CLOTHING: A LITTLE
MOVING TO AND FROM BED TO CHAIR: A LITTLE
WALKING IN HOSPITAL ROOM: A LITTLE
TOILETING: A LITTLE
EATING MEALS: A LITTLE
PATIENT BASELINE BEDBOUND: NO
TURNING FROM BACK TO SIDE WHILE IN FLAT BAD: A LITTLE
PERSONAL GROOMING: A LITTLE
DRESSING REGULAR UPPER BODY CLOTHING: A LITTLE
MOVING FROM LYING ON BACK TO SITTING ON SIDE OF FLAT BED WITH BEDRAILS: A LITTLE
DAILY ACTIVITIY SCORE: 18
CLIMB 3 TO 5 STEPS WITH RAILING: A LITTLE

## 2024-01-25 ASSESSMENT — PATIENT HEALTH QUESTIONNAIRE - PHQ9
SUM OF ALL RESPONSES TO PHQ9 QUESTIONS 1 & 2: 0
1. LITTLE INTEREST OR PLEASURE IN DOING THINGS: NOT AT ALL
2. FEELING DOWN, DEPRESSED OR HOPELESS: NOT AT ALL

## 2024-01-25 ASSESSMENT — ACTIVITIES OF DAILY LIVING (ADL)
HEARING - LEFT EAR: FUNCTIONAL
HEARING - RIGHT EAR: FUNCTIONAL
LACK_OF_TRANSPORTATION: NO
GROOMING: INDEPENDENT
DRESSING YOURSELF: INDEPENDENT
LACK_OF_TRANSPORTATION: NO
ADEQUATE_TO_COMPLETE_ADL: YES
WALKS IN HOME: INDEPENDENT
TOILETING: INDEPENDENT
FEEDING YOURSELF: INDEPENDENT
JUDGMENT_ADEQUATE_SAFELY_COMPLETE_DAILY_ACTIVITIES: YES
PATIENT'S MEMORY ADEQUATE TO SAFELY COMPLETE DAILY ACTIVITIES?: YES
BATHING: INDEPENDENT

## 2024-01-25 ASSESSMENT — LIFESTYLE VARIABLES
AUDIT-C TOTAL SCORE: 0
HOW OFTEN DO YOU HAVE A DRINK CONTAINING ALCOHOL: NEVER
HOW MANY STANDARD DRINKS CONTAINING ALCOHOL DO YOU HAVE ON A TYPICAL DAY: PATIENT DOES NOT DRINK
HOW OFTEN DO YOU HAVE 6 OR MORE DRINKS ON ONE OCCASION: NEVER
SKIP TO QUESTIONS 9-10: 1
AUDIT-C TOTAL SCORE: 0

## 2024-01-25 ASSESSMENT — PAIN - FUNCTIONAL ASSESSMENT
PAIN_FUNCTIONAL_ASSESSMENT: 0-10

## 2024-01-25 ASSESSMENT — PAIN SCALES - GENERAL
PAINLEVEL_OUTOF10: 0 - NO PAIN

## 2024-01-25 NOTE — ED PROCEDURE NOTE
Procedure    Performed by: Rich Beckman MD  Authorized by: Rich Beckman MD                Respiratory Indications: hypoxia  Procedure: Cardiac Ultrasound    Findings:   Views: parasternal long and parasternal short  The pericardial space was visualized and was NEGATIVE for a significant pericardial effusion.    LV: LV systolic function was NORMAL.      Impression:  Cardiac: The focused cardiac ultrasound exam was NORMAL.                   Rich Beckman MD  01/24/24 3618

## 2024-01-25 NOTE — PROGRESS NOTES
Pharmacy Medication History Review    Nuris Squires is a 58 y.o. female admitted for Shortness of breath. Pharmacy reviewed the patient's [id number] medications and allergies for accuracy.    The list below reflects the updated PTA list. Comments regarding how patient may be taking medications differently can be found in the Admit Orders Activity  Prior to Admission Medications   Prescriptions Last Dose Informant Patient Reported? Taking?   OneTouch Delica Plus Lancet 30 gauge misc  Self Yes No   Sig: USE TO TEST BLOOD SUGAR AS DIRECTED   OneTouch Ultra2 Meter misc  Self Yes No   Sig: use 1 to 2 times daily   OneTouch Verio test strips strip  Self Yes No   Sig: USE TO CHECK BLOOD SUGAR AS DIRECTED   acetaminophen (Tylenol) 325 mg tablet Unknown Self No No   Sig: Take 2 tablets (650 mg) by mouth every 4 hours if needed for mild pain (1 - 3).   amLODIPine (Norvasc) 10 mg tablet Past Week Self No Yes   Sig: Take 1 tablet (10 mg) by mouth once daily.   aspirin 81 mg chewable tablet Past Week Self Yes Yes   Sig: Chew 1 tablet (81 mg) once daily.   atorvastatin (Lipitor) 40 mg tablet Past Week Self No Yes   Sig: Take 1 tablet (40 mg) by mouth once daily.   cefTRIAXone (Rocephin) 2 gram/50 mL IV Past Week Self No Yes   Sig: Infuse 50 mL (2 g) at 100 mL/hr over 30 minutes into a venous catheter once every 24 hours for 37 doses.   cholecalciferol (Vitamin D-3) 50 mcg (2,000 unit) capsule Past Week Self Yes Yes   Sig: Take 1 capsule (50 mcg) by mouth once daily.   cloNIDine (Catapres) 0.1 mg tablet Past Week Self No Yes   Sig: Take 1 tablet (0.1 mg) by mouth once daily.   dicyclomine (Bentyl) 10 mg capsule Not Taking Self No No   Sig: Take 1 capsule (10 mg) by mouth 3 times a day as needed (Abdominal pain) for up to 7 days.   Patient not taking: Reported on 1/25/2024   insulin glargine (Lantus U-100 Insulin) 100 unit/mL injection Past Week Self Yes Yes   Sig: Inject 70 Units under the skin once daily at bedtime. Take  as directed per insulin instructions.   insulin lispro (HumaLOG) 100 unit/mL injection Past Week Self Yes Yes   Sig: Inject 0.1 mL (10 Units) under the skin 3 times a day with meals.   lisinopril 40 mg tablet Past Week Self Yes Yes   Sig: Take 1 tablet (40 mg) by mouth once daily.   magnesium oxide (Mag-Ox) 400 mg (241.3 mg magnesium) tablet Not Taking Self No No   Sig: Take 1 tablet (400 mg) by mouth once daily. Do not start before January 19, 2024.   Patient not taking: Reported on 1/25/2024   meclizine (Antivert) 25 mg tablet,chewable Not Taking Self No No   Sig: Chew 1 tablet (25 mg) 3 times a day as needed (vertigo).   Patient not taking: Reported on 1/25/2024   metFORMIN (Glucophage) 500 mg tablet Past Week Self Yes Yes   Sig: Take 1 tablet (500 mg) by mouth 2 times a day with meals.      Facility-Administered Medications: None        The list below reflects the updated allergy list. Please review each documented allergy for additional clarification and justification.  Allergies  Reviewed by Tran Perez on 1/24/2024   No Known Allergies         Patient accepts M2B at discharge. Pharmacy has been updated to [facility].    Sources used to complete the med history include   [employer], [employer] fill history, care everywhere  Call to patient's home pharmacy, [id number] on [address] [facility], who confirmed last medication  was August, however they do have amlodipine filled and ready for    Patient interview, patient was able to provide the names of current medications, but not dosing  Patient reports using Humalog 10 units three times daily with meals, and Basaglar 70 units nightly at bedtime  Patient reports still taking metformin and lisinopril, however per the discharge summary on 1/18/2024 from [facility], these medications were discontinued    Below are additional concerns with the patient's PTA list.  Patient reports still taking metformin and lisinopril, which had been discontinued  "following previous hospitalization  Patient reports needing refills of insulins on discharge (Humalog and Basaglar)  Last fills of medications at patient's preferred pharmacy is August 2023      Monique Munoz, PharmD, [facility] PGY1 Pharmacy Resident  [facility]  Please reach out via [employer] Secure Chat for questions, or if no response call [phone] or Cherrish \"MedRec\"    "

## 2024-01-25 NOTE — PROGRESS NOTES
"Vancomycin Dosing by Pharmacy- INITIAL    Nuris Squires is a 58 y.o. year old female who Pharmacy has been consulted for vancomycin dosing for pneumonia. Based on the patient's indication and renal status this patient will be dosed based on a goal AUC of 500-600.     Renal function is currently stable.    Visit Vitals  BP (!) 203/88   Pulse 87   Temp 36.2 °C (97.2 °F) (Tympanic)   Resp 13        Lab Results   Component Value Date    CREATININE 1.48 (H) 01/24/2024    CREATININE 1.42 (H) 01/23/2024    CREATININE 2.10 (H) 01/18/2024    CREATININE 2.50 (H) 01/17/2024        Patient weight is No results found for: \"PTWEIGHT\"    No results found for: \"CULTURE\"     No intake/output data recorded.  [unfilled]    Lab Results   Component Value Date    PATIENTTEMP 37.0 01/24/2024          Assessment/Plan     Patient will not be given a loading dose.  Will initiate vancomycin maintenance,  1000 mg every 24 hours.    This dosing regimen is predicted by [employer] to result in the following pharmacokinetic parameters:  Exposure target: AUC24 (range)400-600 mg/L.hr   AUC24,ss: 529 mg/L.hr  Probability of AUC24 > 400: 73 %  Ctrough,ss: 15.7 mg/L  Probability of Ctrough,ss > 20: 33 %  Probability of nephrotoxicity (Lodise CHARLENE 2009): 11 %    Follow-up level will be ordered on 1/26 with AM labs unless clinically indicated sooner.  Will continue to monitor renal function daily while on vancomycin and order serum creatinine at least every 48 hours if not already ordered.  Follow for continued vancomycin needs, clinical response, and signs/symptoms of toxicity.       Kobe Morgan, PharmD       "

## 2024-01-25 NOTE — H&P
History Of Present Illness  Nuris Squires is a 58 y.o. female with pmhx of HTN, CKD 3, DM2 and recent right great toe amputation (1/12/23) who presented to the ED with shortness of breath. Patient admitted to MICU for respiratory support on airvo. Patient reports mild shortness of breath began after most recent discharge. Shortness of breath progressed 3-4 days ago, with significant worsening yesterday. Patient can typically walk blocks without SOB, now has difficulty walking between rooms. Patient reports shortness of breath at rest as well, worse with lying down. Also endorses walking up at night short of breath. Denies new lower leg swelling. Denies fever, chills, coughing, chest pain. Denies smoking history.    On arrival to the ED patient satting at 48 on room air. Placed on non-rebreater and then airvo with improved sats to 97. Patient otherwise afebrile, tachycardic to 107, BP initially 152/76, then hypertensive to 220/119, RR of 20. Patient denies headache, new blurred vision. Reports she last took blood pressure medications yesterday morning prior to coming to hospital. Reports no missed doses. CT-PE negative for PE. Significant for large bilateral pleural effusion and bilateral atelectatic/consolidative opacities and ground glass opacities and septal thickening. Also evidence asymmetric left upper lobe consolidative opacity. Flu/Covid negative.    Patient hospitalized from 1/10-1/18 for right great toe infection s/p hallux amputation. Wound cultures growing MSSA and candida. Patient discharged with port and 6 weeks of ceftriaxone    ROS negative unless otherwise stated in HPI    Past Medical History  Past Medical History:   Diagnosis Date    Personal history of other diseases of the circulatory system     History of hypertension    Personal history of other endocrine, nutritional and metabolic disease     History of hyperlipidemia    Personal history of other endocrine, nutritional and metabolic disease   "   History of diabetes mellitus       Surgical History  Past Surgical History:   Procedure Laterality Date    CT ANGIO NECK  1/25/2023    CT NECK ANGIO W AND WO IV CONTRAST 1/25/2023 [facility]    CT HEAD ANGIO W AND WO IV CONTRAST  1/25/2023    CT HEAD ANGIO W AND WO IV CONTRAST 1/25/2023 [facility]    OTHER SURGICAL HISTORY  10/21/2020    Toe amputation        Social History  She reports that she has never smoked. She has never been exposed to tobacco smoke. She has never used smokeless tobacco. She reports that she does not drink alcohol and does not use drugs.    Family History  Family History   Problem Relation Name Age of Onset    Alzheimer's disease Mother      Diabetes Mother      Lung cancer Mother      Diabetes Father      Lung cancer Father      Pancreatic cancer Father      Diabetes Sister      Lung cancer Sister          Allergies  Patient has no known allergies.       Physical Exam  Constitutional: Alert, conversational, no acute distress  HENT: Normocephalic, atraumatic  Cardio: RRR. No murmurs.  Pulm: Diminished breath sounds. No increased work of breathing.  Abdomen: Soft. tenderness to palpation in epigastric region   Extremities: Trace BLE.  Neuro: Alert. No focal deficits. Moving all extremities normally  Psych: normal mood and behavior.    Last Recorded Vitals  Blood pressure 149/83, pulse 91, temperature 36.5 °C (97.7 °F), temperature source Temporal, resp. rate 20, height 1.575 m (5' 2\"), weight 102 kg (225 lb), SpO2 96 %.    Relevant Results        Results for orders placed or performed during the hospital encounter of 01/24/24 (from the past 24 hour(s))   CBC with Differential   Result Value Ref Range    WBC 9.9 4.4 - 11.3 x10*3/uL    nRBC 0.0 0.0 - 0.0 /100 WBCs    RBC 3.05 (L) 4.00 - 5.20 x10*6/uL    Hemoglobin 9.2 (L) 12.0 - 16.0 g/dL    Hematocrit 27.3 (L) 36.0 - 46.0 %    MCV 90 80 - 100 fL    MCH 30.2 26.0 - 34.0 pg    MCHC 33.7 32.0 - 36.0 g/dL    RDW 13.2 11.5 - " 14.5 %    Platelets 447 150 - 450 x10*3/uL    Neutrophils % 77.0 40.0 - 80.0 %    Immature Granulocytes %, Automated 0.7 0.0 - 0.9 %    Lymphocytes % 16.8 13.0 - 44.0 %    Monocytes % 4.8 2.0 - 10.0 %    Eosinophils % 0.1 0.0 - 6.0 %    Basophils % 0.6 0.0 - 2.0 %    Neutrophils Absolute 7.63 1.20 - 7.70 x10*3/uL    Immature Granulocytes Absolute, Automated 0.07 0.00 - 0.70 x10*3/uL    Lymphocytes Absolute 1.67 1.20 - 4.80 x10*3/uL    Monocytes Absolute 0.48 0.10 - 1.00 x10*3/uL    Eosinophils Absolute 0.01 0.00 - 0.70 x10*3/uL    Basophils Absolute 0.06 0.00 - 0.10 x10*3/uL   Comprehensive Metabolic Panel   Result Value Ref Range    Glucose 346 (H) 74 - 99 mg/dL    Sodium 136 136 - 145 mmol/L    Potassium 4.5 3.5 - 5.3 mmol/L    Chloride 100 98 - 107 mmol/L    Bicarbonate 22 21 - 32 mmol/L    Anion Gap 19 10 - 20 mmol/L    Urea Nitrogen 19 6 - 23 mg/dL    Creatinine 1.48 (H) 0.50 - 1.05 mg/dL    eGFR 41 (L) >60 mL/min/1.73m*2    Calcium 9.1 8.6 - 10.6 mg/dL    Albumin 3.4 3.4 - 5.0 g/dL    Alkaline Phosphatase 135 (H) 33 - 110 U/L    Total Protein 7.7 6.4 - 8.2 g/dL    AST 28 9 - 39 U/L    Bilirubin, Total 0.4 0.0 - 1.2 mg/dL    ALT 25 7 - 45 U/L   Brain Natriuretic Peptide   Result Value Ref Range     (H) 0 - 99 pg/mL   Troponin I, High Sensitivity   Result Value Ref Range    Troponin I, High Sensitivity 25 0 - 34 ng/L   Coagulation Screen   Result Value Ref Range    Protime 13.7 (H) 9.8 - 12.8 seconds    INR 1.2 (H) 0.9 - 1.1    aPTT 35 27 - 38 seconds   Blood Gas Venous Full Panel Unsolicited   Result Value Ref Range    POCT pH, Venous 7.38 7.33 - 7.43 pH    POCT pCO2, Venous 41 41 - 51 mm Hg    POCT pO2, Venous 29 (L) 35 - 45 mm Hg    POCT SO2, Venous 27 (L) 45 - 75 %    POCT Oxy Hemoglobin, Venous 26.2 (L) 45.0 - 75.0 %    POCT Hematocrit Calculated, Venous 29.0 (L) 36.0 - 46.0 %    POCT Sodium, Venous 137 136 - 145 mmol/L    POCT Potassium, Venous 5.0 3.5 - 5.3 mmol/L    POCT Chloride, Venous 100 98 -  107 mmol/L    POCT Ionized Calicum, Venous 1.20 1.10 - 1.33 mmol/L    POCT Glucose, Venous 394 (H) 74 - 99 mg/dL    POCT Lactate, Venous 2.3 (H) 0.4 - 2.0 mmol/L    POCT Base Excess, Venous -0.8 -2.0 - 3.0 mmol/L    POCT HCO3 Calculated, Venous 24.3 22.0 - 26.0 mmol/L    POCT Hemoglobin, Venous 9.6 (L) 12.0 - 16.0 g/dL    POCT Anion Gap, Venous 18.0 10.0 - 25.0 mmol/L    Patient Temperature 37.0 degrees Celsius   SST TOP   Result Value Ref Range    Extra Tube Hold for add-ons.    Gray Top   Result Value Ref Range    Extra Tube Hold for add-ons.    Hemoglobin A1C   Result Value Ref Range    Hemoglobin A1C 13.1 (H) see below %    Estimated Average Glucose 329 Not Established mg/dL   Sars-CoV-2 and Influenza A/B PCR   Result Value Ref Range    Flu A Result Not Detected Not Detected    Flu B Result Not Detected Not Detected    Coronavirus 2019, PCR Not Detected Not Detected   ECG 12 lead   Result Value Ref Range    Ventricular Rate 102 BPM    Atrial Rate 102 BPM    MD Interval 118 ms    QRS Duration 74 ms    QT Interval 342 ms    QTC Calculation(Bazett) 445 ms    P Axis 96 degrees    R Axis 119 degrees    T Axis 25 degrees    QRS Count 17 beats    Q Onset 222 ms    P Onset 163 ms    P Offset 205 ms    T Offset 393 ms    QTC Fredericia 408 ms   Blood Gas Lactic Acid, Venous   Result Value Ref Range    POCT Lactate, Venous 0.9 0.4 - 2.0 mmol/L   Blood Culture    Specimen: Peripheral Venipuncture; Blood culture   Result Value Ref Range    Blood Culture Loaded on Instrument - Culture in progress    Blood Culture    Specimen: Peripheral Venipuncture; Blood culture   Result Value Ref Range    Blood Culture Loaded on Instrument - Culture in progress    POCT GLUCOSE   Result Value Ref Range    POCT Glucose 222 (H) 74 - 99 mg/dL   Procalcitonin   Result Value Ref Range    Procalcitonin 0.14 (H) <=0.07 ng/mL   POCT GLUCOSE   Result Value Ref Range    POCT Glucose 217 (H) 74 - 99 mg/dL   Transthoracic Echo (TTE) Complete   Result  Value Ref Range    BSA 2.11 m2   POCT GLUCOSE   Result Value Ref Range    POCT Glucose 202 (H) 74 - 99 mg/dL           Assessment/Plan   Principal Problem:    Shortness of breath  Nuris rodriguez is a 58 year old female with pmhx of HTN, CKD 3, DM2 and recent right great toe toe amputation (1/12/23) who presented to the ED with shortness of breath. Patient admitted to MICU for respiratory support on airvo. Patient with large bilateral pleural effusions concerning for cardiac process. Patient also endorsing orthopnea and PND. High index of suspicion for acute decompensated heart failure at this time. Will obtain echo and diurese. Patient afebrile, has no leukocytosis. Denies recent fever, chills, coughing. Low concern for infectious etiology at this time.    Neuro  -No active issues    Cardiac  #Hypertensive emergency/urgency  #HTN  #c/f acute decompensated heart failure  -Continue home amlodipine 10 mg daily  -Continue home clonidine 0.1mg  -Hold home lisinopril 40 mg daily i/s/o of VENKAT  -S/P IV labetalol 10  -strict I/O. Goal net negative 1-2L  -TTE    #HLD  -Continue home atorvastatin 40mg  -Continue home aspirin 81mg     Pulm  #SOB 2/2 Pleural effusions  -On airvo 60L/80%  -Lasix 40mg  -Diagnostic thoracentesis  -Plan as above    GI  -No active issues    Renal  VENKAT on CKD3  ::Baseline of 0.8  ::Patient had VENKAT during last admission with peak creatinine 5.3. Creatinine overall trending down.  -Avoid nephrotoxic agents  -BID RFP while diuresing    Heme/onc  Anemia  ::Hg 9.2 on arrival - Stable from last admission. Baseline ~11-12.  -CTM    ID  #Hx of R great toe amputation 2/2 infection  -Continue home ceftriaxone    Endo  T2DM  ::HgA1c 13.1 on 1/24  ::home regimen: Lantus 70, Humalog 10 u TID and metformin  -Hold home metformin  -Lantus 35 u, SSI  -Continue home gabapentin 300mg BID for diabetic neuropathy    F: Diuresing  E: Replete PRN  N: NPO on airvo  Access: PIV, mediport  GI: none  DVT prophy: heparin  subq  Code status: Full Code  NOK: Sister Kenyon): [phone]       Eddie Mayfield MD  Internal Medicine-Pediatrics PGY1  Epic Chat

## 2024-01-25 NOTE — CARE PLAN
The patient's goals for the shift include  be able to eat    The clinical goals for the shift include decrease oxygen demands    Over the shift, the patient did not make progress towards their eating goals. They did make progress towards decreasing oxygen requirements.

## 2024-01-25 NOTE — ED PROVIDER NOTES
Patient has been identified as having an emergent need for administration of iodinated contrast for CT scan prior to result of laboratory studies OR despite known elevated GFR due to possibility of life and/or limb threatening pathology.         I acknowledge the risks and benefits of emergently proceeding with contrast administration including that, at present, it is the position of the [employer] that contrast induced nephropathy (MAULIK) is a rare but possible consequence. At this time the benefits of proceeding with contrast administration outweigh the risks.         Attempts will be made to mitigate possible MAULIK risk with IV fluid hydration if able.     Petra Soto, TAL-CNP  01/24/24 6635

## 2024-01-25 NOTE — PROGRESS NOTES
Patient has been identified as having an emergent need for administration of iodinated contrast for CT scan prior to result of laboratory studies OR despite known elevated GFR due to possibility of life and/or limb threatening pathology.         I acknowledge the risks and benefits of emergently proceeding with contrast administration including that, at present, it is the position of the [employer] that contrast induced nephropathy (MAULIK) is a rare but possible consequence. At this time the benefits of proceeding with contrast administration outweigh the risks.         Attempts will be made to mitigate possible MAULIK risk with IV fluid hydration if able.

## 2024-01-25 NOTE — CONSULTS
Vancomycin Dosing by Pharmacy- Cessation of Therapy    Consult to pharmacy for vancomycin dosing has been discontinued by the prescriber, pharmacy will sign off at this time.    Please call pharmacy if there are further questions or re-enter a consult if vancomycin is resumed.     Germaine Fletcher, AurelianoD

## 2024-01-25 NOTE — ED PROCEDURE NOTE
Procedure    Performed by: Rich Beckman MD  Authorized by: Rich Beckman MD                Respiratory Indications: hypoxia  Procedure: Thoracic Ultrasound    Findings:  R Lung Sliding: The RIGHT chest was evaluated and LUNG SLIDING was visualized.  L Lung Sliding: The LEFT chest was evaluated and LUNG SLIDING was visualized.    L Effusion: The LEFT chest was evaluated and there was a PLEURAL EFFUSION.            Impression:  Thorax: The focused thoracic ultrasound exam had ABNORMAL findings as specified. and LEFT pleural effusion and B-lines                   Rich Beckman MD  01/24/24 0249

## 2024-01-25 NOTE — ED PROVIDER NOTES
HPI   Chief Complaint   Patient presents with    Shortness of Breath         History provided by:  Patient   used: No      58-year-old female presents for evaluation of shortness of breath onset after her discharge on the 18th for a toe amputation who states that she is currently on heparin IV injections through her port but acutely worsened over the last day. She presents with a critical patient to room 3 for oxygen saturation in the 40s on room air.  She states that shortness of breath is worse with laying flat and with exertion.  She denies any fevers, chills, chest pain or lower extremity swelling.  She states that she takes her daily medications compliantly.  She denies any recent travel, large crowds, known sick contacts, smoking, drugs, alcohol, trauma, fall, injury.  Denies any additional symptoms or complaints at this time.  States that she does have a Mediport in place for long term antibiotics for her wound infection which she has been receiving compliantly.    ROS is otherwise negative unless stated above.                  Verdon Coma Scale Score: 15                  Patient History   Past Medical History:   Diagnosis Date    Personal history of other diseases of the circulatory system     History of hypertension    Personal history of other endocrine, nutritional and metabolic disease     History of hyperlipidemia    Personal history of other endocrine, nutritional and metabolic disease     History of diabetes mellitus     Past Surgical History:   Procedure Laterality Date    CT ANGIO NECK  1/25/2023    CT NECK ANGIO W AND WO IV CONTRAST 1/25/2023 [facility]    CT HEAD ANGIO W AND WO IV CONTRAST  1/25/2023    CT HEAD ANGIO W AND WO IV CONTRAST 1/25/2023 [facility]    OTHER SURGICAL HISTORY  10/21/2020    Toe amputation     Family History   Problem Relation Name Age of Onset    Alzheimer's disease Mother      Diabetes Mother      Lung cancer Mother      Diabetes  Father      Lung cancer Father      Pancreatic cancer Father      Diabetes Sister      Lung cancer Sister       Social History     Tobacco Use    Smoking status: Never     Passive exposure: Never    Smokeless tobacco: Never   Substance Use Topics    Alcohol use: Never    Drug use: Never       Physical Exam   ED Triage Vitals   Temperature Heart Rate Respirations BP   01/24/24 2024 01/24/24 2024 01/24/24 2024 01/24/24 2024   36.2 °C (97.2 °F) 99 18 152/76      Pulse Ox Temp Source Heart Rate Source Patient Position   01/24/24 2024 01/24/24 2024 -- --   (!) 54 % Tympanic        BP Location FiO2 (%)     -- 01/24/24 2035      90 %       Physical Exam    VS: As documented in the triage note and EMR flowsheet from this visit were reviewed.    GEN: NAD, nontoxic, chronically ill appearing, resting comfortably in the ED cart without difficulty or dyspnea  EYES:  EOMs grossly intact, anicteric sclera, no nystagmus noted, clear and equal bilaterally, no foreign body noted  HEENT: Airway patent, ears with clear tympanic membranes bilaterally. Nasal mucosa clear. Mouth with normal mucosa.  No area of abscess or fluctuance noted.  No drainage noted. Throat has no vesicles, no oropharyngeal exudates and uvula is midline. Face with no lymph node enlargement. No trismus or drooling noted.  Handling secretions.  Speech clear.  CARD: RRR, nontender chest, no crepitus deformities, no JVD, no murmurs rubs or gallops ; nonpitting bilateral lower extremity edema noted.  Positive pulses bilaterally throughout.  Capillary refill less than 3 seconds.  No abnormal redness, warmth, tenderness or swelling noted to bilateral lower extremities.  PULMONARY: Diminished all lung fields. Moving air well, Nonlabored, no accessory muscle use, able to speak complete sentences  ABDOMEN: Abdomen soft, non-distended, no rebound, no guarding. Bowel sounds normal in all 4 quadrants. No tenderness to palpation.  No CVA tenderness.  No masses or organomegaly  noted.  No evidence of peritonitis.   : deferred  MUSK: Spine appears normal, range of motion is not limited, no muscle or joint tenderness. Strength 5 out of 5 equal bilaterally throughout.  No step-offs, deformities or additional signs of trauma noted.  No spinal/midline tenderness to palpation  SKIN: Skin normal color for race, warm, dry.  Postop wound to right lower extremity has a dry and intact dressing.  No evidence of trauma. No rash noted.  NEURO: Alert and oriented x 3, speech is clear, no obvious deficits noted. No facial droop noted.   PSYCH: Alert and oriented to person, place, time/situation. normal mood and affect. No apparent risk to self or others. Thoughts are linear.  Does not appear decompensated.  Does not appear internally stimulated.  LYMPH: No adenopathy or splenomegaly. No cervical, supraclavicular or inguinal lymphadenopathy.    ED Course & MDM   ED Course as of 01/25/24 1319   Wed Jan 24, 2024 2048 ECG 12 lead  EKG interpreted by me.  1/24/2024 at 2035.  Sinus rhythm at 1 2 bpm.   QRS 74 QTc 445.  No ST elevation. [MC]      ED Course User Index  [MC] Rich Beckman MD         Diagnoses as of 01/25/24 1319   Shortness of breath   Hypoxia   Pleural effusion   Pneumonia due to infectious organism, unspecified laterality, unspecified part of lung       Medical Decision Making      Upon assessment patient is a ill-appearing female in no apparent distress.  She is resting comfortably in the ED cart without difficulty or dyspnea.  She is placed on continuous cardiac monitor and pulse oximetry.  As she is satting in the 40s on room air but does not appear in any significant respiratory distress respiratory therapy is called to bedside and she is placed briefly on NRB for support then switched to high flow nasal cannula/airvo with improvement of her oxygenation/saturation.  IV access is obtained.  Plan is to obtain imaging and laboratory studies.    Workup was reviewed.  Imaging was  consistent with large pleural effusions which is likely the cause of her shortness of breath and initial desaturation.  She additionally has a superimposed pneumonia which I ordered blood cultures x 2 and treated her with broad-spectrum IV antibiotics.  Unfortunately I did attempt to wean the patient on the Airvo without success, desaturated into the 80s and she remained on the high oxygen requirement where she needs to be admitted to the medical ICU for further management and likely thoracentesis.  She remained otherwise hemodynamically stable throughout ED course of care.  There is no need for mechanical ventilation at this time.  Findings and plan were discussed with patient and she is good before plan admit to hospital and verbalized understanding.  All questions and concerns were addressed.    Differential Diagnoses Considered: PE, COVID, pneumonia, influenza, pleural effusion, pneumothorax, ACS, acute chest syndrome    Chronic Medical Conditions Significantly Affecting Care: None    External Records Reviewed: I reviewed recent and relevant outside records including: PCP notes, prior discharge summary, previous radiologic studies, HIE, EMS runsheet    Independent Interpretation of Studies:  I independently interpreted: Chest x-ray which was concerning for a left-sided pleural effusion    Escalation of Care:  Appropriate for inpatient management to the medical ICU    Social Determinants of Health Significantly Affecting Care:  None    Prescription Drug Consideration: N/A    Diagnostic testing considered: No additional indicated, Lasix was considered but thoracentesis is more likely indicated for management    Discussion of Management with Other Providers:   I discussed the patient/results with: Admitting team, Dr. Olivas attending      This patient was staffed with ED Attending Dr. Beckman to review the plan of care during ED course.    *Please note that portions of this note may have been completed with a  voice recognition program.  Efforts were made to edit the dictations but occasionally, words are mis-transcribed.      Procedure  Procedures     TAL Marques-BALTA  01/25/24 1683         TAL Marques-BALTA  01/25/24 4747

## 2024-01-25 NOTE — ED TRIAGE NOTES
PT to the ED with c/o shortness of breath onset shortly after dc. PT on IV heparin through port and ABX. PT was discharged after toe amputation 1/19. PT denies any complication from sx.

## 2024-01-26 ENCOUNTER — HOME CARE VISIT (OUTPATIENT)
Dept: HOME HEALTH SERVICES | Facility: HOME HEALTH | Age: 59
End: 2024-01-26
Payer: COMMERCIAL

## 2024-01-26 ENCOUNTER — APPOINTMENT (OUTPATIENT)
Dept: RADIOLOGY | Facility: HOSPITAL | Age: 59
DRG: 186 | End: 2024-01-26
Payer: COMMERCIAL

## 2024-01-26 ENCOUNTER — APPOINTMENT (OUTPATIENT)
Dept: CARDIOLOGY | Facility: HOSPITAL | Age: 59
DRG: 186 | End: 2024-01-26
Payer: COMMERCIAL

## 2024-01-26 LAB
ALBUMIN SERPL BCP-MCNC: 2.7 G/DL (ref 3.4–5)
ALBUMIN SERPL BCP-MCNC: 2.7 G/DL (ref 3.4–5)
ALP SERPL-CCNC: 93 U/L (ref 33–110)
ALT SERPL W P-5'-P-CCNC: 19 U/L (ref 7–45)
ANION GAP SERPL CALC-SCNC: 11 MMOL/L (ref 10–20)
ANION GAP SERPL CALC-SCNC: 13 MMOL/L (ref 10–20)
AST SERPL W P-5'-P-CCNC: 15 U/L (ref 9–39)
BASOPHILS # BLD AUTO: 0.06 X10*3/UL (ref 0–0.1)
BASOPHILS NFR BLD AUTO: 0.9 %
BILIRUB SERPL-MCNC: 0.2 MG/DL (ref 0–1.2)
BUN SERPL-MCNC: 19 MG/DL (ref 6–23)
BUN SERPL-MCNC: 19 MG/DL (ref 6–23)
CALCIUM SERPL-MCNC: 8.8 MG/DL (ref 8.6–10.6)
CALCIUM SERPL-MCNC: 8.9 MG/DL (ref 8.6–10.6)
CHLORIDE SERPL-SCNC: 101 MMOL/L (ref 98–107)
CHLORIDE SERPL-SCNC: 105 MMOL/L (ref 98–107)
CO2 SERPL-SCNC: 30 MMOL/L (ref 21–32)
CO2 SERPL-SCNC: 31 MMOL/L (ref 21–32)
CREAT SERPL-MCNC: 1.29 MG/DL (ref 0.5–1.05)
CREAT SERPL-MCNC: 1.47 MG/DL (ref 0.5–1.05)
EGFRCR SERPLBLD CKD-EPI 2021: 41 ML/MIN/1.73M*2
EGFRCR SERPLBLD CKD-EPI 2021: 48 ML/MIN/1.73M*2
EOSINOPHIL # BLD AUTO: 0.27 X10*3/UL (ref 0–0.7)
EOSINOPHIL NFR BLD AUTO: 4.1 %
ERYTHROCYTE [DISTWIDTH] IN BLOOD BY AUTOMATED COUNT: 13.3 % (ref 11.5–14.5)
GLUCOSE BLD MANUAL STRIP-MCNC: 115 MG/DL (ref 74–99)
GLUCOSE BLD MANUAL STRIP-MCNC: 117 MG/DL (ref 74–99)
GLUCOSE BLD MANUAL STRIP-MCNC: 154 MG/DL (ref 74–99)
GLUCOSE BLD MANUAL STRIP-MCNC: 189 MG/DL (ref 74–99)
GLUCOSE BLD MANUAL STRIP-MCNC: 192 MG/DL (ref 74–99)
GLUCOSE BLD MANUAL STRIP-MCNC: 223 MG/DL (ref 74–99)
GLUCOSE BLD MANUAL STRIP-MCNC: 69 MG/DL (ref 74–99)
GLUCOSE BLD MANUAL STRIP-MCNC: 71 MG/DL (ref 74–99)
GLUCOSE SERPL-MCNC: 203 MG/DL (ref 74–99)
GLUCOSE SERPL-MCNC: 95 MG/DL (ref 74–99)
HCT VFR BLD AUTO: 26.1 % (ref 36–46)
HGB BLD-MCNC: 8 G/DL (ref 12–16)
IMM GRANULOCYTES # BLD AUTO: 0.02 X10*3/UL (ref 0–0.7)
IMM GRANULOCYTES NFR BLD AUTO: 0.3 % (ref 0–0.9)
LYMPHOCYTES # BLD AUTO: 2.42 X10*3/UL (ref 1.2–4.8)
LYMPHOCYTES NFR BLD AUTO: 36.6 %
MAGNESIUM SERPL-MCNC: 2.02 MG/DL (ref 1.6–2.4)
MAGNESIUM SERPL-MCNC: 2.11 MG/DL (ref 1.6–2.4)
MCH RBC QN AUTO: 29.9 PG (ref 26–34)
MCHC RBC AUTO-ENTMCNC: 30.7 G/DL (ref 32–36)
MCV RBC AUTO: 97 FL (ref 80–100)
MONOCYTES # BLD AUTO: 0.52 X10*3/UL (ref 0.1–1)
MONOCYTES NFR BLD AUTO: 7.9 %
NEUTROPHILS # BLD AUTO: 3.32 X10*3/UL (ref 1.2–7.7)
NEUTROPHILS NFR BLD AUTO: 50.2 %
NRBC BLD-RTO: 0 /100 WBCS (ref 0–0)
PHOSPHATE SERPL-MCNC: 3.5 MG/DL (ref 2.5–4.9)
PHOSPHATE SERPL-MCNC: 4.1 MG/DL (ref 2.5–4.9)
PLATELET # BLD AUTO: 410 X10*3/UL (ref 150–450)
POTASSIUM SERPL-SCNC: 3.6 MMOL/L (ref 3.5–5.3)
POTASSIUM SERPL-SCNC: 4 MMOL/L (ref 3.5–5.3)
PROT SERPL-MCNC: 6.5 G/DL (ref 6.4–8.2)
RBC # BLD AUTO: 2.68 X10*6/UL (ref 4–5.2)
SODIUM SERPL-SCNC: 140 MMOL/L (ref 136–145)
SODIUM SERPL-SCNC: 143 MMOL/L (ref 136–145)
TSH SERPL-ACNC: 1.37 MIU/L (ref 0.44–3.98)
WBC # BLD AUTO: 6.6 X10*3/UL (ref 4.4–11.3)

## 2024-01-26 PROCEDURE — 2020000001 HC ICU ROOM DAILY

## 2024-01-26 PROCEDURE — 2500000002 HC RX 250 W HCPCS SELF ADMINISTERED DRUGS (ALT 637 FOR MEDICARE OP, ALT 636 FOR OP/ED): Mod: MUE | Performed by: STUDENT IN AN ORGANIZED HEALTH CARE EDUCATION/TRAINING PROGRAM

## 2024-01-26 PROCEDURE — 2500000004 HC RX 250 GENERAL PHARMACY W/ HCPCS (ALT 636 FOR OP/ED)

## 2024-01-26 PROCEDURE — 80053 COMPREHEN METABOLIC PANEL: CPT

## 2024-01-26 PROCEDURE — 2500000002 HC RX 250 W HCPCS SELF ADMINISTERED DRUGS (ALT 637 FOR MEDICARE OP, ALT 636 FOR OP/ED)

## 2024-01-26 PROCEDURE — 83735 ASSAY OF MAGNESIUM: CPT

## 2024-01-26 PROCEDURE — 37799 UNLISTED PX VASCULAR SURGERY: CPT

## 2024-01-26 PROCEDURE — 84100 ASSAY OF PHOSPHORUS: CPT

## 2024-01-26 PROCEDURE — 93005 ELECTROCARDIOGRAM TRACING: CPT

## 2024-01-26 PROCEDURE — 80069 RENAL FUNCTION PANEL: CPT | Mod: CCI

## 2024-01-26 PROCEDURE — 87632 RESP VIRUS 6-11 TARGETS: CPT

## 2024-01-26 PROCEDURE — 71045 X-RAY EXAM CHEST 1 VIEW: CPT | Performed by: RADIOLOGY

## 2024-01-26 PROCEDURE — 82947 ASSAY GLUCOSE BLOOD QUANT: CPT

## 2024-01-26 PROCEDURE — 2500000001 HC RX 250 WO HCPCS SELF ADMINISTERED DRUGS (ALT 637 FOR MEDICARE OP)

## 2024-01-26 PROCEDURE — 85025 COMPLETE CBC W/AUTO DIFF WBC: CPT

## 2024-01-26 PROCEDURE — 99291 CRITICAL CARE FIRST HOUR: CPT

## 2024-01-26 PROCEDURE — 71045 X-RAY EXAM CHEST 1 VIEW: CPT

## 2024-01-26 PROCEDURE — 84443 ASSAY THYROID STIM HORMONE: CPT

## 2024-01-26 RX ORDER — DEXTROSE MONOHYDRATE 100 MG/ML
0.3 INJECTION, SOLUTION INTRAVENOUS ONCE AS NEEDED
Status: DISCONTINUED | OUTPATIENT
Start: 2024-01-26 | End: 2024-01-31 | Stop reason: HOSPADM

## 2024-01-26 RX ORDER — INSULIN GLARGINE 100 [IU]/ML
35 INJECTION, SOLUTION SUBCUTANEOUS NIGHTLY
Status: DISCONTINUED | OUTPATIENT
Start: 2024-01-26 | End: 2024-01-26

## 2024-01-26 RX ORDER — FUROSEMIDE 10 MG/ML
40 INJECTION INTRAMUSCULAR; INTRAVENOUS ONCE
Status: COMPLETED | OUTPATIENT
Start: 2024-01-26 | End: 2024-01-26

## 2024-01-26 RX ORDER — DEXTROSE 50 % IN WATER (D50W) INTRAVENOUS SYRINGE
25
Status: DISCONTINUED | OUTPATIENT
Start: 2024-01-26 | End: 2024-01-31 | Stop reason: HOSPADM

## 2024-01-26 RX ORDER — INSULIN LISPRO 100 [IU]/ML
0-5 INJECTION, SOLUTION INTRAVENOUS; SUBCUTANEOUS
Status: DISCONTINUED | OUTPATIENT
Start: 2024-01-26 | End: 2024-01-27

## 2024-01-26 RX ORDER — INSULIN GLARGINE 100 [IU]/ML
20 INJECTION, SOLUTION SUBCUTANEOUS NIGHTLY
Status: DISCONTINUED | OUTPATIENT
Start: 2024-01-26 | End: 2024-01-26

## 2024-01-26 RX ORDER — INSULIN LISPRO 100 [IU]/ML
5 INJECTION, SOLUTION INTRAVENOUS; SUBCUTANEOUS
Status: DISCONTINUED | OUTPATIENT
Start: 2024-01-26 | End: 2024-01-27

## 2024-01-26 RX ORDER — INSULIN GLARGINE 100 [IU]/ML
35 INJECTION, SOLUTION SUBCUTANEOUS NIGHTLY
Status: DISCONTINUED | OUTPATIENT
Start: 2024-01-26 | End: 2024-01-27

## 2024-01-26 RX ORDER — INSULIN GLARGINE 100 [IU]/ML
53 INJECTION, SOLUTION SUBCUTANEOUS NIGHTLY
Status: DISCONTINUED | OUTPATIENT
Start: 2024-01-26 | End: 2024-01-26

## 2024-01-26 RX ADMIN — HEPARIN SODIUM 7500 UNITS: 5000 INJECTION INTRAVENOUS; SUBCUTANEOUS at 06:35

## 2024-01-26 RX ADMIN — AMLODIPINE BESYLATE 10 MG: 10 TABLET ORAL at 08:04

## 2024-01-26 RX ADMIN — ATORVASTATIN CALCIUM 40 MG: 40 TABLET, FILM COATED ORAL at 08:04

## 2024-01-26 RX ADMIN — CEFTRIAXONE SODIUM 2 G: 2 INJECTION, SOLUTION INTRAVENOUS at 08:42

## 2024-01-26 RX ADMIN — INSULIN GLARGINE 35 UNITS: 100 INJECTION, SOLUTION SUBCUTANEOUS at 20:30

## 2024-01-26 RX ADMIN — AZITHROMYCIN 500 MG: 500 INJECTION, POWDER, LYOPHILIZED, FOR SOLUTION INTRAVENOUS at 13:32

## 2024-01-26 RX ADMIN — INSULIN LISPRO 5 UNITS: 100 INJECTION, SOLUTION INTRAVENOUS; SUBCUTANEOUS at 17:13

## 2024-01-26 RX ADMIN — INSULIN LISPRO 1 UNITS: 100 INJECTION, SOLUTION INTRAVENOUS; SUBCUTANEOUS at 11:53

## 2024-01-26 RX ADMIN — GABAPENTIN 300 MG: 300 CAPSULE ORAL at 08:04

## 2024-01-26 RX ADMIN — INSULIN LISPRO 1 UNITS: 100 INJECTION, SOLUTION INTRAVENOUS; SUBCUTANEOUS at 13:46

## 2024-01-26 RX ADMIN — FUROSEMIDE 40 MG: 10 INJECTION, SOLUTION INTRAVENOUS at 10:23

## 2024-01-26 RX ADMIN — HEPARIN SODIUM 7500 UNITS: 5000 INJECTION INTRAVENOUS; SUBCUTANEOUS at 13:33

## 2024-01-26 RX ADMIN — CLONIDINE HYDROCHLORIDE 0.1 MG: 0.1 TABLET ORAL at 08:04

## 2024-01-26 RX ADMIN — HEPARIN SODIUM 7500 UNITS: 5000 INJECTION INTRAVENOUS; SUBCUTANEOUS at 21:21

## 2024-01-26 RX ADMIN — INSULIN LISPRO 2 UNITS: 100 INJECTION, SOLUTION INTRAVENOUS; SUBCUTANEOUS at 17:17

## 2024-01-26 RX ADMIN — GABAPENTIN 300 MG: 300 CAPSULE ORAL at 20:30

## 2024-01-26 RX ADMIN — ASPIRIN 81 MG CHEWABLE TABLET 81 MG: 81 TABLET CHEWABLE at 06:35

## 2024-01-26 SDOH — SOCIAL STABILITY: SOCIAL INSECURITY: WITHIN THE LAST YEAR, HAVE YOU BEEN AFRAID OF YOUR PARTNER OR EX-PARTNER?: NO

## 2024-01-26 SDOH — SOCIAL STABILITY: SOCIAL INSECURITY
WITHIN THE LAST YEAR, HAVE YOU BEEN KICKED, HIT, SLAPPED, OR OTHERWISE PHYSICALLY HURT BY YOUR PARTNER OR EX-PARTNER?: NO

## 2024-01-26 SDOH — ECONOMIC STABILITY: INCOME INSECURITY: IN THE PAST 12 MONTHS, HAS THE ELECTRIC, GAS, OIL, OR WATER COMPANY THREATENED TO SHUT OFF SERVICE IN YOUR HOME?: NO

## 2024-01-26 SDOH — SOCIAL STABILITY: SOCIAL INSECURITY
WITHIN THE LAST YEAR, HAVE TO BEEN RAPED OR FORCED TO HAVE ANY KIND OF SEXUAL ACTIVITY BY YOUR PARTNER OR EX-PARTNER?: NO

## 2024-01-26 SDOH — SOCIAL STABILITY: SOCIAL INSECURITY: WITHIN THE LAST YEAR, HAVE YOU BEEN HUMILIATED OR EMOTIONALLY ABUSED IN OTHER WAYS BY YOUR PARTNER OR EX-PARTNER?: NO

## 2024-01-26 ASSESSMENT — PAIN - FUNCTIONAL ASSESSMENT
PAIN_FUNCTIONAL_ASSESSMENT: 0-10

## 2024-01-26 ASSESSMENT — PAIN SCALES - GENERAL
PAINLEVEL_OUTOF10: 0 - NO PAIN

## 2024-01-26 ASSESSMENT — COGNITIVE AND FUNCTIONAL STATUS - GENERAL
TOILETING: A LITTLE
HELP NEEDED FOR BATHING: A LITTLE
STANDING UP FROM CHAIR USING ARMS: A LITTLE
MOBILITY SCORE: 18
MOVING FROM LYING ON BACK TO SITTING ON SIDE OF FLAT BED WITH BEDRAILS: A LITTLE
WALKING IN HOSPITAL ROOM: A LITTLE
DAILY ACTIVITIY SCORE: 18
EATING MEALS: A LITTLE
MOVING TO AND FROM BED TO CHAIR: A LITTLE
DRESSING REGULAR UPPER BODY CLOTHING: A LITTLE
DRESSING REGULAR LOWER BODY CLOTHING: A LITTLE
CLIMB 3 TO 5 STEPS WITH RAILING: A LITTLE
TURNING FROM BACK TO SIDE WHILE IN FLAT BAD: A LITTLE
PERSONAL GROOMING: A LITTLE

## 2024-01-26 NOTE — PROGRESS NOTES
"Nuris Rodriguez is a 58 y.o. female on day 1 of admission presenting with Shortness of breath.    Subjective   No acute events overnight. Patient denies shortness of breath, fever, chills.       Objective     Physical Exam  Constitutional: Alert, conversational, no acute distress  HENT: Normocephalic, atraumatic  Cardio: RRR. No murmurs.  Pulm: No increased work of breathing. On airvo  Abdomen: Soft. Non-tender  Extremities: Trace BLE.  Neuro: Alert. No focal deficits. Moving all extremities normally  Psych: normal mood and behavior.    Last Recorded Vitals  Blood pressure (!) 147/92, pulse 68, temperature 35.8 °C (96.4 °F), temperature source Temporal, resp. rate 13, height 1.575 m (5' 2\"), weight 103 kg (227 lb 1.2 oz), SpO2 94 %.  Intake/Output last 3 Shifts:  I/O last 3 completed shifts:  In: 100 (1 mL/kg) [I.V.:50 (0.5 mL/kg); IV Piggyback:50]  Out: 2150 (20.9 mL/kg) [Urine:2150 (0.6 mL/kg/hr)]  Weight: 103 kg     Relevant Results              Scheduled medications  amLODIPine, 10 mg, oral, Daily  aspirin, 81 mg, oral, Daily  atorvastatin, 40 mg, oral, Daily  azithromycin, 500 mg, intravenous, q24h  cefTRIAXone, 2 g, intravenous, q24h  cloNIDine, 0.1 mg, oral, Daily  gabapentin, 300 mg, oral, BID  heparin (porcine), 7,500 Units, subcutaneous, q8h NEHEMIAS  insulin glargine, 35 Units, subcutaneous, Nightly  insulin lispro, 0-5 Units, subcutaneous, TID with meals  perflutren protein A microsphere, 0.5 mL, intravenous, Once in imaging  sulfur hexafluoride microsphr, 2 mL, intravenous, Once in imaging      Continuous medications     PRN medications  PRN medications: dextrose 10 % in water (D10W), dextrose, glucagon, oxygen           Assessment/Plan   Principal Problem:    Shortness of breath    Nuris rodriguez is a 58 year old female with pmhx of HTN, CKD 3, DM2 and recent right great toe toe amputation (1/12/23) who presented to the ED with shortness of breath. Patient admitted to MICU for respiratory support on airvo. " Patient with large bilateral pleural effusions concerning for cardiac process. Patient also endorsing orthopnea and PND. Echo with hyperdynamic EF of 70-75%, without signs of heart failure. Patient has had decreasing o2 requirements with diuresing, so will continue diuresis today. Patient afebrile, has no leukocytosis. Denies recent fever, chills, coughing. Although oxygen requirements have improved, patient still requiring airvo. Will add azithromycin for atypical coverage at this time.    Updates 1/26  -Lasix 40mg today.  -Bedside POCUS - no area visible for thoracentesis  -Repeat CXR  -Start azithromycin  -RVP  -Incentive spirometry      Neuro  -No active issues     Cardiac  #Hypertensive emergency/urgency, resolved  #HTN  #c/f acute decompensated heart failure  -Continue home amlodipine 10 mg daily  -Continue home clonidine 0.1mg  -Hold home lisinopril 40 mg daily i/s/o of VENKAT  -strict I/O. Goal net negative 1-2L  -Lasix 40mg today    #HLD  -Continue home atorvastatin 40mg  -Continue home aspirin 81mg     Pulm  #SOB  #Pleural effusions  -On airvo 30L/70%  -s/p Lasix 40mg x2  -strict I/O. Goal net negative 1-2L  -Lasix 40mg  -RVP  -Repeat CXR  -Azithromycin (1/26)      GI  -No active issues     Renal  VENKAT on CKD3, improving  ::Baseline of 0.8  ::Patient had VENKAT during last admission with peak creatinine 5.3. Creatinine overall trending down.  -Avoid nephrotoxic agents  -BID RFP while diuresing     Heme/onc  Anemia  ::Hg 9.2 on arrival - Stable from last admission. Baseline ~11-12.  -CTM     ID  #Hx of R great toe amputation 2/2 infection  -Continue home ceftriaxone    #C/f atypical pneumonia  -Azithromycin 1/26     Endo  T2DM  ::HgA1c 13.1 on 1/24  ::home regimen: Lantus 70, Humalog 10 u TID and metformin  -Hold home metformin  -Lantus 35 u, SSI  -Continue home gabapentin 300mg BID for diabetic neuropathy     F: Diuresing  E: Replete PRN  N: Regular diet  Access: PIV, mediport  GI: none  DVT prophy: heparin  subq  Code status: Full Code  NOK: Sister Kenyon): [phone]           Eddie Mayfield MD  Internal Medicine-Pediatrics PGY1  Epic Chat

## 2024-01-26 NOTE — CARE PLAN
The patient's goals for the shift include      The clinical goals for the shift include decrease oxygenn requirements    Over the shift, the patient  make progress toward the following goals.

## 2024-01-26 NOTE — ED PROCEDURE NOTE
Procedure  Critical Care    Performed by: Rich Beckman MD  Authorized by: Rich Beckman MD    Critical care provider statement:     Critical care time (minutes):  34    Critical care time was exclusive of:  Separately billable procedures and treating other patients and teaching time    Critical care was necessary to treat or prevent imminent or life-threatening deterioration of the following conditions:  Respiratory failure (acute respiratory failure with hypoxia requiring high flow oxygen)    Critical care was time spent personally by me on the following activities:  Examination of patient, evaluation of patient's response to treatment, obtaining history from patient or surrogate, ordering and performing treatments and interventions, ordering and review of laboratory studies, ordering and review of radiographic studies, pulse oximetry, re-evaluation of patient's condition and review of old charts    I assumed direction of critical care for this patient from another provider in my specialty: no      Care discussed with: admitting provider                 Rich Beckman MD  01/26/24 1046

## 2024-01-26 NOTE — PROGRESS NOTES
SOCIAL WORK NOTE   SW met with patient at bedside for assessment (please see flowsheets for further details). Patient normally lives at home with SO. When prompted about decision makers, she had no preference for an order. Patient states that she recently had surguery and now has [facility]. She is agreeable for resumption. Patient uses walker and cane at home. Currently denied needs for social work. Social work to follow.   QUEENIE Jacobsen, LISW-S ([id number])    .

## 2024-01-27 LAB
ALBUMIN SERPL BCP-MCNC: 2.6 G/DL (ref 3.4–5)
ANION GAP SERPL CALC-SCNC: 12 MMOL/L (ref 10–20)
BASOPHILS # BLD AUTO: 0.05 X10*3/UL (ref 0–0.1)
BASOPHILS NFR BLD AUTO: 0.8 %
BUN SERPL-MCNC: 24 MG/DL (ref 6–23)
CALCIUM SERPL-MCNC: 8.3 MG/DL (ref 8.6–10.6)
CHLORIDE SERPL-SCNC: 104 MMOL/L (ref 98–107)
CO2 SERPL-SCNC: 31 MMOL/L (ref 21–32)
CREAT SERPL-MCNC: 1.83 MG/DL (ref 0.5–1.05)
EGFRCR SERPLBLD CKD-EPI 2021: 31 ML/MIN/1.73M*2
EOSINOPHIL # BLD AUTO: 0.31 X10*3/UL (ref 0–0.7)
EOSINOPHIL NFR BLD AUTO: 5.2 %
ERYTHROCYTE [DISTWIDTH] IN BLOOD BY AUTOMATED COUNT: 13.5 % (ref 11.5–14.5)
GLUCOSE BLD MANUAL STRIP-MCNC: 107 MG/DL (ref 74–99)
GLUCOSE BLD MANUAL STRIP-MCNC: 128 MG/DL (ref 74–99)
GLUCOSE BLD MANUAL STRIP-MCNC: 166 MG/DL (ref 74–99)
GLUCOSE BLD MANUAL STRIP-MCNC: 217 MG/DL (ref 74–99)
GLUCOSE SERPL-MCNC: 123 MG/DL (ref 74–99)
HCT VFR BLD AUTO: 26.5 % (ref 36–46)
HGB BLD-MCNC: 8.2 G/DL (ref 12–16)
IMM GRANULOCYTES # BLD AUTO: 0.01 X10*3/UL (ref 0–0.7)
IMM GRANULOCYTES NFR BLD AUTO: 0.2 % (ref 0–0.9)
LYMPHOCYTES # BLD AUTO: 1.87 X10*3/UL (ref 1.2–4.8)
LYMPHOCYTES NFR BLD AUTO: 31.5 %
MAGNESIUM SERPL-MCNC: 2.02 MG/DL (ref 1.6–2.4)
MCH RBC QN AUTO: 29.8 PG (ref 26–34)
MCHC RBC AUTO-ENTMCNC: 30.9 G/DL (ref 32–36)
MCV RBC AUTO: 96 FL (ref 80–100)
MONOCYTES # BLD AUTO: 0.53 X10*3/UL (ref 0.1–1)
MONOCYTES NFR BLD AUTO: 8.9 %
NEUTROPHILS # BLD AUTO: 3.16 X10*3/UL (ref 1.2–7.7)
NEUTROPHILS NFR BLD AUTO: 53.4 %
NRBC BLD-RTO: 0 /100 WBCS (ref 0–0)
PHOSPHATE SERPL-MCNC: 5 MG/DL (ref 2.5–4.9)
PLATELET # BLD AUTO: 440 X10*3/UL (ref 150–450)
POTASSIUM SERPL-SCNC: 4.4 MMOL/L (ref 3.5–5.3)
RBC # BLD AUTO: 2.75 X10*6/UL (ref 4–5.2)
SODIUM SERPL-SCNC: 143 MMOL/L (ref 136–145)
STAPHYLOCOCCUS SPEC CULT: NORMAL
WBC # BLD AUTO: 5.9 X10*3/UL (ref 4.4–11.3)

## 2024-01-27 PROCEDURE — 1100000001 HC PRIVATE ROOM DAILY

## 2024-01-27 PROCEDURE — 83735 ASSAY OF MAGNESIUM: CPT

## 2024-01-27 PROCEDURE — 2500000004 HC RX 250 GENERAL PHARMACY W/ HCPCS (ALT 636 FOR OP/ED)

## 2024-01-27 PROCEDURE — 2500000001 HC RX 250 WO HCPCS SELF ADMINISTERED DRUGS (ALT 637 FOR MEDICARE OP)

## 2024-01-27 PROCEDURE — 99232 SBSQ HOSP IP/OBS MODERATE 35: CPT

## 2024-01-27 PROCEDURE — 99233 SBSQ HOSP IP/OBS HIGH 50: CPT | Performed by: NURSE PRACTITIONER

## 2024-01-27 PROCEDURE — 82947 ASSAY GLUCOSE BLOOD QUANT: CPT

## 2024-01-27 PROCEDURE — 37799 UNLISTED PX VASCULAR SURGERY: CPT

## 2024-01-27 PROCEDURE — 85025 COMPLETE CBC W/AUTO DIFF WBC: CPT

## 2024-01-27 PROCEDURE — 80069 RENAL FUNCTION PANEL: CPT

## 2024-01-27 RX ORDER — POLYETHYLENE GLYCOL 3350 17 G/17G
34 POWDER, FOR SOLUTION ORAL ONCE
Status: COMPLETED | OUTPATIENT
Start: 2024-01-27 | End: 2024-01-27

## 2024-01-27 RX ORDER — INSULIN LISPRO 100 [IU]/ML
10 INJECTION, SOLUTION INTRAVENOUS; SUBCUTANEOUS
Status: DISCONTINUED | OUTPATIENT
Start: 2024-01-27 | End: 2024-01-27

## 2024-01-27 RX ORDER — AMOXICILLIN 250 MG
1 CAPSULE ORAL 2 TIMES DAILY
Status: DISCONTINUED | OUTPATIENT
Start: 2024-01-27 | End: 2024-01-31 | Stop reason: HOSPADM

## 2024-01-27 RX ORDER — POLYETHYLENE GLYCOL 3350 17 G/17G
17 POWDER, FOR SOLUTION ORAL DAILY
Status: DISCONTINUED | OUTPATIENT
Start: 2024-01-28 | End: 2024-01-31 | Stop reason: HOSPADM

## 2024-01-27 RX ORDER — INSULIN GLARGINE 100 [IU]/ML
53 INJECTION, SOLUTION SUBCUTANEOUS NIGHTLY
Status: DISCONTINUED | OUTPATIENT
Start: 2024-01-27 | End: 2024-01-27

## 2024-01-27 RX ORDER — INSULIN GLARGINE 100 [IU]/ML
35 INJECTION, SOLUTION SUBCUTANEOUS NIGHTLY
Status: DISCONTINUED | OUTPATIENT
Start: 2024-01-27 | End: 2024-01-31 | Stop reason: HOSPADM

## 2024-01-27 RX ORDER — INSULIN LISPRO 100 [IU]/ML
0-10 INJECTION, SOLUTION INTRAVENOUS; SUBCUTANEOUS
Status: DISCONTINUED | OUTPATIENT
Start: 2024-01-27 | End: 2024-01-31 | Stop reason: HOSPADM

## 2024-01-27 RX ADMIN — POLYETHYLENE GLYCOL 3350 34 G: 17 POWDER, FOR SOLUTION ORAL at 11:37

## 2024-01-27 RX ADMIN — ATORVASTATIN CALCIUM 40 MG: 40 TABLET, FILM COATED ORAL at 09:26

## 2024-01-27 RX ADMIN — ASPIRIN 81 MG CHEWABLE TABLET 81 MG: 81 TABLET CHEWABLE at 11:37

## 2024-01-27 RX ADMIN — CLONIDINE HYDROCHLORIDE 0.1 MG: 0.1 TABLET ORAL at 09:26

## 2024-01-27 RX ADMIN — HEPARIN SODIUM 7500 UNITS: 5000 INJECTION INTRAVENOUS; SUBCUTANEOUS at 22:35

## 2024-01-27 RX ADMIN — HEPARIN SODIUM 7500 UNITS: 5000 INJECTION INTRAVENOUS; SUBCUTANEOUS at 05:05

## 2024-01-27 RX ADMIN — GABAPENTIN 300 MG: 300 CAPSULE ORAL at 20:30

## 2024-01-27 RX ADMIN — SENNOSIDES AND DOCUSATE SODIUM 1 TABLET: 8.6; 5 TABLET ORAL at 11:37

## 2024-01-27 RX ADMIN — INSULIN GLARGINE 35 UNITS: 100 INJECTION, SOLUTION SUBCUTANEOUS at 20:30

## 2024-01-27 RX ADMIN — INSULIN LISPRO 1 UNITS: 100 INJECTION, SOLUTION INTRAVENOUS; SUBCUTANEOUS at 11:42

## 2024-01-27 RX ADMIN — AMLODIPINE BESYLATE 10 MG: 10 TABLET ORAL at 09:26

## 2024-01-27 RX ADMIN — AZITHROMYCIN 500 MG: 500 INJECTION, POWDER, LYOPHILIZED, FOR SOLUTION INTRAVENOUS at 11:37

## 2024-01-27 RX ADMIN — INSULIN LISPRO 5 UNITS: 100 INJECTION, SOLUTION INTRAVENOUS; SUBCUTANEOUS at 09:33

## 2024-01-27 RX ADMIN — GABAPENTIN 300 MG: 300 CAPSULE ORAL at 09:26

## 2024-01-27 RX ADMIN — HEPARIN SODIUM 7500 UNITS: 5000 INJECTION INTRAVENOUS; SUBCUTANEOUS at 14:11

## 2024-01-27 RX ADMIN — CEFTRIAXONE SODIUM 2 G: 2 INJECTION, SOLUTION INTRAVENOUS at 09:26

## 2024-01-27 ASSESSMENT — PAIN - FUNCTIONAL ASSESSMENT
PAIN_FUNCTIONAL_ASSESSMENT: 0-10

## 2024-01-27 ASSESSMENT — PAIN SCALES - GENERAL
PAINLEVEL_OUTOF10: 0 - NO PAIN

## 2024-01-27 ASSESSMENT — COGNITIVE AND FUNCTIONAL STATUS - GENERAL
CLIMB 3 TO 5 STEPS WITH RAILING: A LOT
MOBILITY SCORE: 21
WALKING IN HOSPITAL ROOM: A LITTLE

## 2024-01-27 NOTE — CARE PLAN
The patient's goals for the shift include      The clinical goals for the shift include pt will have no c/o of sob Pulse ox >90 % this shift

## 2024-01-27 NOTE — PROGRESS NOTES
Nuris Squires is a 58 y.o. female on day 2 of admission presenting with Shortness of breath.    Subjective   Transferred to Pulmonology service this PM from the MICU.     Patient seen resting comfortably in bed. Pt is tolerating PO intake and denies n/v and abdominal pain. Pt is voiding spontaneously and denies dysuria, hematuria, and suprapubic pain/fullness. Pt has not had BM since Wednesday. Pt currently is asymptomatic and denies fevers/chills, chest pain, lightheadedness/dizziness, palpitations, and SOB. She does endorse nonproductive cough.    She reports that she began to feel lightheaded/dizzy Tuesday PM. This continued to worsen until Thursday when she felt that she could no longer manage this at home as she noted the onset of severe SOB and was unable to ambulate even in her room. She was brought to [facility] by her son.     She reports she has a history of poorly controlled HTN. She is on home lisinopril 40 mg every day, amlodipine 10 mg every day, and clonidine 0.1  mg every day. She reports that she has poor medication compliance and only takes her medications 4-5 days a week. She checks her BP at home ~4-5 xs week with an average SBP in the 150s-160s. She has a family history of HTN and CAD (most notably her sister had MI in her 70s). She denies a family/personal history of pulmonary disease and ESRD requiring HD.     No further questions, concerns, or complaints at this time.     Hospital Course:  Nuris Squires is a 58 year old female with pmhx of HTN, CKD 3, DM2, and recent right great toe toe amputation (1/12/23) who presented to the ED with shortness of breath. Patient admitted to MICU for respiratory support on airvo. Patient with large bilateral pleural effusions concerning for cardiac process. Patient also endorsing orthopnea and PND. Echo with hyperdynamic EF of 70-75%, without signs of heart failure. Patient has had decreasing o2 requirements with diuresing, so will continue diuresis today.  Patient afebrile, has no leukocytosis. Denies recent fever, chills, coughing. Oxygen requirements have improved, weaned to NC. Will add azithromycin for atypical coverage at this time. Plan to keep azithromycin for 3 days -     Objective     24 Hour Vitals  Temp:  [35.5 °C (95.9 °F)-36.6 °C (97.9 °F)] 36.1 °C (97 °F)  Heart Rate:  [72-86] 81  Resp:  [13-21] 18  BP: ()/() 132/105  FiO2 (%):  [44 %-60 %] 44 %    Temp (24hrs), Av.2 °C (97.1 °F), Min:35.5 °C (95.9 °F), Max:36.6 °C (97.9 °F)     24 hour Intake/Output    Intake/Output Summary (Last 24 hours) at 2024 1553  Last data filed at 2024 1900  Gross per 24 hour   Intake --   Output 250 ml   Net -250 ml        Exam:  General: Resting comfortably in bed. Well developed, well nourished. Appears stated age. In no acute distress   HEENT: Normocephalic and atraumatic. EOMI, sclera non-icteric, MMM, no JVD.  Cardiovascular: RRR, no r/m/g  Pulmonary: Lungs sounds diminished in bilateral bases. No wheezes/rhonchi/rales appreciated. On 6 L NC. No increased WOB or dyspnea to conversation.   GI: +BS, soft, non-tender, non-distended  : No suprapubic/ flank pain  Extremities: No LE edema. Dressing in place over right foot.   Skin: warm and dry. No rashes or lesions   Neurologic: CN II-XII grossly intact. No focal neurologic deficit   Psych: Pleasant. Appropriate mood and affect     Labs  CBC  Results from last 72 hours   Lab Units 24  0505 24  0358 24  203   WBC AUTO x10*3/uL 5.9 6.6 9.9   HEMOGLOBIN g/dL 8.2* 8.0* 9.2*   HEMATOCRIT % 26.5* 26.1* 27.3*   PLATELETS AUTO x10*3/uL 440 410 447        BMP  Results from last 72 hours   Lab Units 24  0505 24  1343 24  0358   SODIUM mmol/L 143 140 143   POTASSIUM mmol/L 4.4 4.0 3.6   CHLORIDE mmol/L 104 101 105   BUN mg/dL 24* 19 19   CREATININE mg/dL 1.83* 1.47* 1.29*   MAGNESIUM mg/dL 2.02 2.02 2.11   PHOSPHORUS mg/dL 5.0* 4.1 3.5       Medications   Scheduled  Medications  amLODIPine, 10 mg, oral, Daily  aspirin, 81 mg, oral, Daily  atorvastatin, 40 mg, oral, Daily  azithromycin, 500 mg, intravenous, q24h  cefTRIAXone, 2 g, intravenous, q24h  cloNIDine, 0.1 mg, oral, Daily  gabapentin, 300 mg, oral, BID  heparin (porcine), 7,500 Units, subcutaneous, q8h NEHEMIAS  insulin glargine, 53 Units, subcutaneous, Nightly  insulin lispro, 0-5 Units, subcutaneous, TID with meals  insulin lispro, 10 Units, subcutaneous, TID with meals  [START ON 1/28/2024] polyethylene glycol, 17 g, oral, Daily  sennosides-docusate sodium, 1 tablet, oral, BID     Continuous Medications    PRN Medications  PRN medications: dextrose 10 % in water (D10W), dextrose 10 % in water (D10W), dextrose, dextrose, glucagon, glucagon, oxygen        Assessment/Plan   Principal Problem:    Shortness of breath    Nuris Squires is a 58 y.o. female with pmhx of HTN, CKD 3, DM2 and recent right great toe amputation (1/12/23) who presented to the ED with shortness of breath found to have SBPs into the 200s and radiographic (B/l pleural effusions) and laboratory evidence (BNP>300 supportive of fluid overloaded state. Patient admitted to MICU for respiratory support on airvo and underwent IV diuresis with resultant reduction of oxygen requirement to 6L via NC. She is now transferred to [facility] for [facility] management.    Initial presentation of hypoxia and elevated BNP I/s/o hypertension noted to the SBPs in the 220s with improvement with IV diuresis is c/w flash pulmonary edema 2/2 HTN emergency. However, she c/w bilateral pleural effusions of unclear etiology. Given her normal echocardiogram (LVEF hyperdynamic with no evidence of diastolic dysfunction and RVSP wnl) less likely that her effusions as 2/2 ADHF. Her infectious workup has been unrevealing to date and she without clear signs of infectious etiology as she is afebrile and without leukocytosis. It is clear that her AHRF is 2/2 these effusions and she would benefit from  thoracentesis both therapeutically and diagnostically. Would favor IR guided thoracentesis if unable to complete at bedside. Will plan for diuretic holiday today I/s/o VENKAT on CKD with low c/f CRS (euvolemic on exam). Can consider spot doses as needed to wean O2 requirements.     #AHRF  #Bilateral pleural effusion of unclear etiology    #c/f flash pulmonary edema vs ADHF I/s/o hypertensive emergency   #c/f atypical PNA  :: CXR on admission withLow lung volumes with resultant bronchovascular crowding. Hazy left basilar opacity with blunting of the left costophrenic angle which may represent atelectasis or infiltrate. No evidence of sizeable pleural effusion.  ::CTPE (01/25)- no evidence of acute PE, but with  Cardiomegaly and large bilateral pleural effusion and bilateral atelectatic/consolidative opacities and also groundglass opacities  and septal thickening compatible with edema. Also with asymmetric left upper lobe consolidative opacity concerning for superimposed pneumonia.  :: Procal 0.14, ESR 59, CRP 0.21  :: Flu, COVID- negative, RSV pending, MRSA negative    :: Strep pneumo, legionella negative   ::Bcx with NGTD  :: , troponin wnl   :: Per MICU note bedside POCUS with no visible area for thoracentesis   :: s/p IV lasix 40 mg x 3  :: Echocardiogram (01/25)- LVEF 70-75% with no RWMA, LV septal wall thickness mildly increased with concentric LVH; however, with a normal pattern of LV diastolic filling. Also with normal RV global systolic function, RSVP wnl.   Plan:  - wean O2 as tolerated, goal O2 sat >92%  - Would favor thoracentesis to evaluate etiology of bilateral pleural effusion. Can plan for IR guided if necessary   - Diuretic holiday given worsening renal function and apparent euvolemia on exam.    - C/w Azithromycin (end 01/28) and Ceftriaxone (Plan for 6 weeks treatment, 02/21/24- as per below)      #HTN, Uncontrolled I/s/o medication noncompliance   # Hypertensive emergency (AHDF vs flash  pulmonary edema?), resolved   ::Home regimen: Lisinopril 40 mg every day, Norvasc 10 mg every day, Clonidine 0.1 mg every day  :: Echocardiogram as above   Plan:  - holding home lisinopril due to recent VENKAT  - cont Amlodipine and Clonidine for BP control   - Would favor optimization of her home going BP regimen with long acting agents     #HLD  ::Last lipid panel 05/2023- cholesterol 174, HDL 38.5, LDL 98,   :: NM stress test 10/2019 Normal myocardial perfusion study without evidence of ischemia or  prior infarction.  The 10-year ASCVD risk score (Sunshine GÓMEZ, et al., 2019) is: 13.5%    Values used to calculate the score:      Age: 58 years      Sex: Female      Is Non- : Yes      Diabetic: Yes      Tobacco smoker: No      Systolic Blood Pressure: 120 mmHg      Is BP treated: Yes      HDL Cholesterol: 38.5 mg/dL      Total Cholesterol: 174 mg/dL    Plan:  - c/w Lipitor 40 mg every day, aspirin 81 mg every day    #Right Great toe amputation 01/12/2023, with Group B, Candida, MSSA infection   ::Cultures Previous cx:  01/09 Urine cx Group B  01/10 Foot ulcer MSSA, Sadaf G.   01/12 R first digit cx Group B strep  Plan:  - Cont Ceftriaxone 01/25- STOP 02/21/24     #Stage 2 non-oliguric VENKAT on CKD III  :: Baseline creatinine 0.8   :: Peaked at 5.3 creatinine during last admission (01/14)  :: Evaluated by Nephrology last hospitalization who felt Likely acute tubular necrosis multifactorial in the setting of hypotension, infection, IV contrast, Vancomycin and Zosyn with no indications for HD   :: 1.42 on admission with initial improvement to 1.3 following IV diuresis, now uptrending to 1.83  Plan:  - Diuretic holiday, intravascular volume depletion likely contributing   - Consider workup UA/ Ulytes with Urea/ Renal US if fails to improve     #TIIDM, uncontrolled   #Neuropathy   :: A1C 13.1 on admission   ::home regimen: Lantus 70, Humalog 10 u TID and metformin 500 mg BID  Impression: Suspect  medication noncompliance given current A1C and history of noncompliance with antihypertensive meds   Plan:  - Lantus 35 Units at bedtime, SSI#2, titrate to goal BS of 140-180  -cont home gabapentin 300 mg BID     #Anemia of Chronic disease   :: Baseline Hgb 11-12   ::Iron studies (01/2024)- Iron 59, TIBC 172, %saturation 34  :: Ferritin 276 (01/2024)  Plan:  - CTM with daily CBC     #Insomnia  -cont melatonin for nsomnia     F PRN  E PRN, K>4, Mg>2  N Carb control diet  A: Serena catheter     GI Ppx: no indicated  DVT Ppx: subcutaneous heparin      CODE status FULL CODE   NOK: Sister (Mone): [phone]        Jarocho Pina MD

## 2024-01-27 NOTE — PROGRESS NOTES
Critical Care Daily Progress      Subjective   Patient is a 58 y.o. female admitted on 1/24/2024  8:27 PM with the following indication(s) for ICU care   Presented with acute hypoxemia respiratory failure due to HFpEF, Bilateral pleural effusion, atypical pneumonia.     Interval History: pat was weaned off airvo this morning, now on 6L NC.     Complaints: has none..   Review of Systems  Physical Exam    Scheduled Medications:   amLODIPine, 10 mg, oral, Daily  aspirin, 81 mg, oral, Daily  atorvastatin, 40 mg, oral, Daily  azithromycin, 500 mg, intravenous, q24h  cefTRIAXone, 2 g, intravenous, q24h  cloNIDine, 0.1 mg, oral, Daily  gabapentin, 300 mg, oral, BID  heparin (porcine), 7,500 Units, subcutaneous, q8h NEHEMIAS  insulin glargine, 53 Units, subcutaneous, Nightly  insulin lispro, 0-5 Units, subcutaneous, TID with meals  insulin lispro, 10 Units, subcutaneous, TID with meals  [START ON 1/28/2024] polyethylene glycol, 17 g, oral, Daily  sennosides-docusate sodium, 1 tablet, oral, BID         Continuous Medications:         PRN Medications:   PRN medications: dextrose 10 % in water (D10W), dextrose 10 % in water (D10W), dextrose, dextrose, glucagon, glucagon, oxygen    Objective   Vitals:  Most Recent:  Vitals:    01/27/24 1200   BP:    Pulse:    Resp:    Temp: 36.1 °C (97 °F)   SpO2:        24hr Min/Max:  Temp  Min: 35.5 °C (95.9 °F)  Max: 36.6 °C (97.9 °F)  Pulse  Min: 72  Max: 86  BP  Min: 99/79  Max: 146/83  Resp  Min: 13  Max: 21  SpO2  Min: 91 %  Max: 100 %    LDA:  Implantable Port 01/25/24 Right Chest (Active)   Placement Date/Time: (c) 01/25/24 (c) 1052   Orientation: Right  Implantable Port Location: Chest   Number of days: 2       External Urinary Catheter Female (Active)   Placement Date/Time: 01/26/24 0102   Hand Hygiene Completed: Yes  External Catheter Type: Female   Number of days: 1         Vent settings:  FiO2 (%):  [44 %-60 %] 44 %    Hemodynamic parameters for last 24 hours:        I/O:    Intake/Output Summary (Last 24 hours) at 1/27/2024 1417  Last data filed at 1/26/2024 1900  Gross per 24 hour   Intake --   Output 250 ml   Net -250 ml       Physical Exam:   Pt awake, laying in bed comfortably  Pt lung sounds clear upper lobes, no abnormal lung sounds (wheezing, rales or rhonchi)  RRR s1/s2 no s3/s4 no gmr   No edema on exam   Abd soft non tender bsx4   R foot dressing intact     Lab/Radiology/Diagnostic Review:  [unfilled]  Results for orders placed or performed during the hospital encounter of 01/24/24 (from the past 24 hour(s))   POCT GLUCOSE   Result Value Ref Range    POCT Glucose 223 (H) 74 - 99 mg/dL   POCT GLUCOSE   Result Value Ref Range    POCT Glucose 189 (H) 74 - 99 mg/dL   Magnesium   Result Value Ref Range    Magnesium 2.02 1.60 - 2.40 mg/dL   CBC and Auto Differential   Result Value Ref Range    WBC 5.9 4.4 - 11.3 x10*3/uL    nRBC 0.0 0.0 - 0.0 /100 WBCs    RBC 2.75 (L) 4.00 - 5.20 x10*6/uL    Hemoglobin 8.2 (L) 12.0 - 16.0 g/dL    Hematocrit 26.5 (L) 36.0 - 46.0 %    MCV 96 80 - 100 fL    MCH 29.8 26.0 - 34.0 pg    MCHC 30.9 (L) 32.0 - 36.0 g/dL    RDW 13.5 11.5 - 14.5 %    Platelets 440 150 - 450 x10*3/uL    Neutrophils % 53.4 40.0 - 80.0 %    Immature Granulocytes %, Automated 0.2 0.0 - 0.9 %    Lymphocytes % 31.5 13.0 - 44.0 %    Monocytes % 8.9 2.0 - 10.0 %    Eosinophils % 5.2 0.0 - 6.0 %    Basophils % 0.8 0.0 - 2.0 %    Neutrophils Absolute 3.16 1.20 - 7.70 x10*3/uL    Immature Granulocytes Absolute, Automated 0.01 0.00 - 0.70 x10*3/uL    Lymphocytes Absolute 1.87 1.20 - 4.80 x10*3/uL    Monocytes Absolute 0.53 0.10 - 1.00 x10*3/uL    Eosinophils Absolute 0.31 0.00 - 0.70 x10*3/uL    Basophils Absolute 0.05 0.00 - 0.10 x10*3/uL   Renal Function Panel   Result Value Ref Range    Glucose 123 (H) 74 - 99 mg/dL    Sodium 143 136 - 145 mmol/L    Potassium 4.4 3.5 - 5.3 mmol/L    Chloride 104 98 - 107 mmol/L    Bicarbonate 31 21 - 32 mmol/L    Anion Gap 12 10 - 20  mmol/L    Urea Nitrogen 24 (H) 6 - 23 mg/dL    Creatinine 1.83 (H) 0.50 - 1.05 mg/dL    eGFR 31 (L) >60 mL/min/1.73m*2    Calcium 8.3 (L) 8.6 - 10.6 mg/dL    Phosphorus 5.0 (H) 2.5 - 4.9 mg/dL    Albumin 2.6 (L) 3.4 - 5.0 g/dL   POCT GLUCOSE   Result Value Ref Range    POCT Glucose 107 (H) 74 - 99 mg/dL   POCT GLUCOSE   Result Value Ref Range    POCT Glucose 166 (H) 74 - 99 mg/dL       Assessment/Plan   Principal Problem:    Shortness of breath    ICU to [facility] Transfer Summary     I:  ICU Admission Reason & Brief ICU Course:      Nuris rodriguez is a 58 year old female with pmhx of HTN, CKD 3, DM2 and recent right great toe toe amputation (1/12/23) who presented to the ED with shortness of breath. Patient admitted to MICU for respiratory support on airvo. Patient with large bilateral pleural effusions concerning for cardiac process. Patient also endorsing orthopnea and PND. Echo with hyperdynamic EF of 70-75%, without signs of heart failure. Patient has had decreasing o2 requirements with diuresing, so will continue diuresis today. Patient afebrile, has no leukocytosis. Denies recent fever, chills, coughing. Although oxygen requirements have improved, patient still requiring airvo. Will add azithromycin for atypical coverage at this time.  Weaned off Airvo, now transfer to [facility] on 6L NC.   Plan to keep azithromycin for 3 days 01/26-28       C: Code Status/DPOA Info/Goals of Care/ACP Note    Full Code  DPOA/Contact Number: Sister Kenyon): [phone]     U: Unprescribing & Pertinent High-Risk Medications    Changes to home meds:   Pt on lantus 70 units at night currently on 53 units      Anticoagulation: Yes - SQH    Antibiotics:   [] N/A - no current planned antimicrobioals  [x]  Azythromycin for atypical Pneumonia 01/26-28  Ceftriaxone for MSSA, Candida, and Group B infection process (Plan for 6 weeks treatment, 02/21/24)      P: Pending Tests at the Time of Transfer   None       A: Active consultants, including  Rehab:   []  Subspecialty Consultants:  none  [x]  PT  [x]  OT  []  SLP  [x]  Wound Care R Foot infection s/p amputation     U: Uncertainty Measure/Diagnostic Pause:    Working diagnosis at the time of transfer acute hypoxemia respiratory due to ADHF with volume overload and Bilateral pleural effusion     Diagnosis Degree of Certainty: 1. High degree of certainty about the clinical diagnosis.     S: Summary of Major Problems and To-Dos:   Holding diuresis today due to elevated creatinine.     To-do list prior to transfer:  None     E: Exam, including Lines/Drains/Airways & Data Review:     Difficult airway? N/A  Lines/drains assessed for removal? No    Within 30 minutes of the patient physically leaving the floor, a Floor Readiness Note needs to be placed with updated vitals.        58 year female who recently was discharge home after R great toe amputation 01/12/2023, pt presented with acute hypoxemia respiratory failure due to volume overload and c/o atypical pneumonia. Pt needed Airvo, now weaned off to 6L NC.    Neuro: Neuropathy pain.   -cont home gabapentin 300 mg BID   -cont melatonin for nsomnia   -PT/OT    MSK:   -cont acetaminophen prn     Resp: Acute hypoxemia respiratory failure due to ADHF, volume overload, Bilateral pleural effusion and c/p atypical pneumonia   Oxygen Airvo 10/50, now weaned off to HF NC 6 L   - Hold diuresis today due to increase creat  - wean o2 per sat>92%  - cont Azithro (start on 01/26 STOP 01/27)     Cardiac: hx of HTN   Presented with volume overload, bilateral pleural effusion, and found to have LVH.   - holding home lisinopril due to recent VENKAT  - cont Amlodipine and Clonidine for BP control   - cont ASA and Statin   -ECHO EF 70-75%, LVH     ID: afebrile, no leukocytosis   Right Great toe amputation 01/12/2023, with Group B, Candida, MSSA infection   Follow up with Viral panel   - Abx: Azithromycin 01/26 STOP 01/27  Cont Ceftriaxone 01/25- STOP 02/21/24   - Cultures Previous  cx:  01/09 Urine cx Group B  01/10 Foot ulcer MSSA, Sadaf G.   01/12 R first digit cx Group B strep  Rest of cx Staph, Strep, Legionella, Bld cx x2, AFB and Fungal cx negative     FEN/GI: Constipation   Last BM none   - Bowel Regimen: colace and miralax   - Daily RFP  - Diet: Carb diet     Renal: Hx of CKD III presented with acute on chronic CKD, holding ACE, possible ADHF.   Peaked at 5.3 creatinine   Baseline creatinine 0.8   - Daily wt and Strict I&Os  - Daily RFP    Heme: Anemia of chronic disease   Baseline Hgb 11-12   -Daily CBC    Endo: hx of DM II   - HOME regimen: Lantus 70, Humalog 10 u TID and metformin   - increase meal coverage Lispro 10 units TID   - ISS TID   - Lantus 53 units at night   - Hypoglycemia protocol     P no PPI, + Heparin subcutaneous   F none  E wnl   N Carb control diet  A: Serena catheter     CODE status FULL CODE   NOK: Sister (Mone): [phone]     Dispo plan to transfer to [facility] no tele needed      I spent 60 minutes with the patient. 30 minutes of which was providing critical care for the patient.

## 2024-01-27 NOTE — SIGNIFICANT EVENT
Floor Readiness Note       I, personally, evaluated Nuris Squires prior to transfer to the floor, including reviewing all current laboratory and imaging studies. The patient remains appropriate for transfer to the floor. Bedside nurse and respiratory therapy are also in agreement of patient's readiness for the floor.     Brief summary:  Nuris Squires is a 58 y.o. female who was admitted to the MICU on 01/24 for acute hypoxemia respiratory failure. They have been treated with Diuresis, Airvo now weaned to 6L NC and Azithro.     Updated focused Physical Exam:  Awake, ambulating in the room   Pt on 5L NC   Awake, no wheezing, rales or rhonchi  Abd soft non tender bsx4  RRR s1/s2 no s3/s4 no edema on exam   Clear lung sounds     Current Vital Signs:  Heart Rate: 79 (01/27/24 1600 : Merle FUENTES RN)  BP: 120/86 (01/27/24 1600 : Merle Raygoza V RN)  Temp: 36.1 °C (97 °F) (01/27/24 1200 : Angela Sullivan)  Resp: 19 (01/27/24 1600 : Merle FUENTES RN)  SpO2: 95 % (01/27/24 1600 : Merle FUENTES RN)    Relevant updates since rounds:  Keep Azthri for 3 days Stop 01/28      Accepting team, Jose, received verbal sign out and the Provider Care team/Attending has been updated. Bedside nurse will now call accepting nurse for report and patient will be transferred to [place].    Delicia Williamson, TAL-CNP

## 2024-01-28 ENCOUNTER — APPOINTMENT (OUTPATIENT)
Dept: HOME HEALTH SERVICES | Facility: HOME HEALTH | Age: 59
End: 2024-01-28
Payer: COMMERCIAL

## 2024-01-28 LAB
ALBUMIN SERPL BCP-MCNC: 2.7 G/DL (ref 3.4–5)
ANION GAP SERPL CALC-SCNC: 15 MMOL/L (ref 10–20)
BASOPHILS # BLD AUTO: 0.05 X10*3/UL (ref 0–0.1)
BASOPHILS NFR BLD AUTO: 0.9 %
BUN SERPL-MCNC: 30 MG/DL (ref 6–23)
CALCIUM SERPL-MCNC: 8.5 MG/DL (ref 8.6–10.6)
CHLORIDE SERPL-SCNC: 105 MMOL/L (ref 98–107)
CO2 SERPL-SCNC: 26 MMOL/L (ref 21–32)
CREAT SERPL-MCNC: 1.93 MG/DL (ref 0.5–1.05)
EGFRCR SERPLBLD CKD-EPI 2021: 30 ML/MIN/1.73M*2
EOSINOPHIL # BLD AUTO: 0.36 X10*3/UL (ref 0–0.7)
EOSINOPHIL NFR BLD AUTO: 6.2 %
ERYTHROCYTE [DISTWIDTH] IN BLOOD BY AUTOMATED COUNT: 13.4 % (ref 11.5–14.5)
GLUCOSE BLD MANUAL STRIP-MCNC: 153 MG/DL (ref 74–99)
GLUCOSE BLD MANUAL STRIP-MCNC: 204 MG/DL (ref 74–99)
GLUCOSE BLD MANUAL STRIP-MCNC: 217 MG/DL (ref 74–99)
GLUCOSE BLD MANUAL STRIP-MCNC: 85 MG/DL (ref 74–99)
GLUCOSE SERPL-MCNC: 116 MG/DL (ref 74–99)
HCT VFR BLD AUTO: 27.5 % (ref 36–46)
HGB BLD-MCNC: 8.6 G/DL (ref 12–16)
IMM GRANULOCYTES # BLD AUTO: 0.01 X10*3/UL (ref 0–0.7)
IMM GRANULOCYTES NFR BLD AUTO: 0.2 % (ref 0–0.9)
LYMPHOCYTES # BLD AUTO: 2 X10*3/UL (ref 1.2–4.8)
LYMPHOCYTES NFR BLD AUTO: 34.2 %
MAGNESIUM SERPL-MCNC: 2 MG/DL (ref 1.6–2.4)
MCH RBC QN AUTO: 30 PG (ref 26–34)
MCHC RBC AUTO-ENTMCNC: 31.3 G/DL (ref 32–36)
MCV RBC AUTO: 96 FL (ref 80–100)
MONOCYTES # BLD AUTO: 0.5 X10*3/UL (ref 0.1–1)
MONOCYTES NFR BLD AUTO: 8.6 %
NEUTROPHILS # BLD AUTO: 2.92 X10*3/UL (ref 1.2–7.7)
NEUTROPHILS NFR BLD AUTO: 49.9 %
NRBC BLD-RTO: 0 /100 WBCS (ref 0–0)
PHOSPHATE SERPL-MCNC: 5.3 MG/DL (ref 2.5–4.9)
PLATELET # BLD AUTO: 434 X10*3/UL (ref 150–450)
POTASSIUM SERPL-SCNC: 4.5 MMOL/L (ref 3.5–5.3)
RBC # BLD AUTO: 2.87 X10*6/UL (ref 4–5.2)
SODIUM SERPL-SCNC: 141 MMOL/L (ref 136–145)
WBC # BLD AUTO: 5.8 X10*3/UL (ref 4.4–11.3)

## 2024-01-28 PROCEDURE — 80069 RENAL FUNCTION PANEL: CPT

## 2024-01-28 PROCEDURE — 82947 ASSAY GLUCOSE BLOOD QUANT: CPT

## 2024-01-28 PROCEDURE — 1100000001 HC PRIVATE ROOM DAILY

## 2024-01-28 PROCEDURE — 2500000004 HC RX 250 GENERAL PHARMACY W/ HCPCS (ALT 636 FOR OP/ED)

## 2024-01-28 PROCEDURE — 83735 ASSAY OF MAGNESIUM: CPT

## 2024-01-28 PROCEDURE — 2500000001 HC RX 250 WO HCPCS SELF ADMINISTERED DRUGS (ALT 637 FOR MEDICARE OP)

## 2024-01-28 PROCEDURE — 2500000002 HC RX 250 W HCPCS SELF ADMINISTERED DRUGS (ALT 637 FOR MEDICARE OP, ALT 636 FOR OP/ED)

## 2024-01-28 PROCEDURE — 85025 COMPLETE CBC W/AUTO DIFF WBC: CPT

## 2024-01-28 PROCEDURE — 36415 COLL VENOUS BLD VENIPUNCTURE: CPT

## 2024-01-28 PROCEDURE — 99232 SBSQ HOSP IP/OBS MODERATE 35: CPT

## 2024-01-28 RX ADMIN — INSULIN GLARGINE 35 UNITS: 100 INJECTION, SOLUTION SUBCUTANEOUS at 20:53

## 2024-01-28 RX ADMIN — HEPARIN SODIUM 7500 UNITS: 5000 INJECTION INTRAVENOUS; SUBCUTANEOUS at 05:08

## 2024-01-28 RX ADMIN — AZITHROMYCIN 500 MG: 500 INJECTION, POWDER, LYOPHILIZED, FOR SOLUTION INTRAVENOUS at 11:57

## 2024-01-28 RX ADMIN — HEPARIN SODIUM 7500 UNITS: 5000 INJECTION INTRAVENOUS; SUBCUTANEOUS at 14:35

## 2024-01-28 RX ADMIN — CLONIDINE HYDROCHLORIDE 0.1 MG: 0.1 TABLET ORAL at 08:10

## 2024-01-28 RX ADMIN — INSULIN LISPRO 4 UNITS: 100 INJECTION, SOLUTION INTRAVENOUS; SUBCUTANEOUS at 18:35

## 2024-01-28 RX ADMIN — ASPIRIN 81 MG CHEWABLE TABLET 81 MG: 81 TABLET CHEWABLE at 08:11

## 2024-01-28 RX ADMIN — CEFTRIAXONE SODIUM 2 G: 2 INJECTION, SOLUTION INTRAVENOUS at 08:15

## 2024-01-28 RX ADMIN — GABAPENTIN 300 MG: 300 CAPSULE ORAL at 08:10

## 2024-01-28 RX ADMIN — GABAPENTIN 300 MG: 300 CAPSULE ORAL at 20:53

## 2024-01-28 RX ADMIN — INSULIN LISPRO 2 UNITS: 100 INJECTION, SOLUTION INTRAVENOUS; SUBCUTANEOUS at 14:36

## 2024-01-28 RX ADMIN — HEPARIN SODIUM 7500 UNITS: 5000 INJECTION INTRAVENOUS; SUBCUTANEOUS at 21:00

## 2024-01-28 RX ADMIN — AMLODIPINE BESYLATE 10 MG: 10 TABLET ORAL at 08:10

## 2024-01-28 RX ADMIN — ATORVASTATIN CALCIUM 40 MG: 40 TABLET, FILM COATED ORAL at 08:10

## 2024-01-28 ASSESSMENT — COGNITIVE AND FUNCTIONAL STATUS - GENERAL
WALKING IN HOSPITAL ROOM: A LITTLE
CLIMB 3 TO 5 STEPS WITH RAILING: A LITTLE
MOBILITY SCORE: 22
DAILY ACTIVITIY SCORE: 24
WALKING IN HOSPITAL ROOM: A LITTLE
MOBILITY SCORE: 22
DAILY ACTIVITIY SCORE: 24
CLIMB 3 TO 5 STEPS WITH RAILING: A LITTLE

## 2024-01-28 ASSESSMENT — PAIN SCALES - GENERAL
PAINLEVEL_OUTOF10: 0 - NO PAIN
PAINLEVEL_OUTOF10: 0 - NO PAIN

## 2024-01-28 NOTE — CARE PLAN
The patient's goals for the shift include  to have less shortness of breath when moving around    The clinical goals for the shift include Patient will have no c/o of shortness of breath or pulse >92% during this shift    Over the shift, the patient did make progress toward the following goals. Recommendations to address these barriers include bedside commode nearby to prevent patient having to transfer all the way to restroom which causes her SOB.

## 2024-01-28 NOTE — PROGRESS NOTES
"Nuris Squires is a 58 y.o. female on day 3 of admission presenting with Shortness of breath.    Subjective   No acute events overnight. Upon evaluation this AM, pt reports that she continues to feel SOB (on 6L NC from baseline RA at home).   Patient denies any fever, chills, lightheadedness, abdominal pain, nausea, vomiting         Objective   Weight: 102 kg (225 lb) (01/24/24 2024)    Daily Weight  01/28/24 : 105 kg (231 lb 7.7 oz)    Last Recorded Vitals  Heart Rate:  [72-85]   Temp:  [36.5 °C (97.7 °F)-36.7 °C (98.1 °F)]   Resp:  [14-20]   BP: (110-158)/(66-89)   Height:  [157.5 cm (5' 2\")]   Weight:  [105 kg (231 lb 7.7 oz)-105 kg (231 lb 14.8 oz)]   SpO2:  [94 %-99 %]        Intake/Output last 3 Shifts:  I/O last 3 completed shifts:  In: 300 (2.9 mL/kg) [IV Piggyback:300]  Out: 250 (2.4 mL/kg) [Urine:250 (0.1 mL/kg/hr)]  Weight: 105 kg       Relevant Results  Results from last 7 days   Lab Units 01/28/24  0631 01/27/24  0505 01/26/24  0358   WBC AUTO x10*3/uL 5.8 5.9 6.6   HEMOGLOBIN g/dL 8.6* 8.2* 8.0*   HEMATOCRIT % 27.5* 26.5* 26.1*   PLATELETS AUTO x10*3/uL 434 440 410     Results from last 7 days   Lab Units 01/28/24  0631 01/27/24  0505 01/26/24  1343   SODIUM mmol/L 141 143 140   POTASSIUM mmol/L 4.5 4.4 4.0   CO2 mmol/L 26 31 30   ANION GAP mmol/L 15 12 13   BUN mg/dL 30* 24* 19   CREATININE mg/dL 1.93* 1.83* 1.47*   GLUCOSE mg/dL 116* 123* 203*   EGFR mL/min/1.73m*2 30* 31* 41*   MAGNESIUM mg/dL 2.00 2.02 2.02   PHOSPHORUS mg/dL 5.3* 5.0* 4.1      Results from last 7 days   Lab Units 01/26/24  0358 01/24/24 2032 01/23/24  1230   ALT U/L 19 25 16   AST U/L 15 28 17   ALK PHOS U/L 93 135* 67      Results from last 7 days   Lab Units 01/24/24 2032   INR  1.2*         Results from last 7 days   Lab Units 01/24/24 2032   POCT PH, VENOUS pH 7.38   POCT PCO2, VENOUS mm Hg 41           No lab exists for component: \"BNPRESU\", \"CPKT\"          Physical Exam  General: Resting comfortably in bed. Well " developed, well nourished. Appears stated age. In no acute distress   HEENT: Normocephalic and atraumatic. EOMI, sclera non-icteric, MMM, no JVD.  Cardiovascular: RRR, no r/m/g  Pulmonary: Lungs sounds diminished in bilateral bases. No wheezes/rhonchi/rales appreciated. On 6 L NC. No increased WOB or dyspnea to conversation.   GI: +BS, soft, non-tender, non-distended  : No suprapubic/ flank pain  Extremities: Dressing in place over right foot.   Skin: warm and dry. No rashes or lesions   Neurologic: CN II-XII grossly intact. No focal neurologic deficit   Psych: Pleasant. Appropriate mood and affect     Medications    amLODIPine, 10 mg, oral, Daily  aspirin, 81 mg, oral, Daily  atorvastatin, 40 mg, oral, Daily  cefTRIAXone, 2 g, intravenous, q24h  cloNIDine, 0.1 mg, oral, Daily  gabapentin, 300 mg, oral, BID  heparin (porcine), 7,500 Units, subcutaneous, q8h NEHEMIAS  insulin glargine, 35 Units, subcutaneous, Nightly  insulin lispro, 0-10 Units, subcutaneous, TID with meals  polyethylene glycol, 17 g, oral, Daily  sennosides-docusate sodium, 1 tablet, oral, BID             PRN medications: dextrose 10 % in water (D10W), dextrose 10 % in water (D10W), dextrose, dextrose, glucagon, glucagon, oxygen            Assessment/Plan   Principal Problem:    Shortness of breath    Nuris Squires is a 58 y.o. female with pmhx of HTN, CKD 3, DM2 and recent right great toe amputation (1/12/23) who presented to the ED with shortness of breath found to have SBPs into the 200s and radiographic (B/l pleural effusions) and laboratory evidence (BNP>300 supportive of fluid overloaded state. Patient admitted to MICU for respiratory support on airvo and underwent IV diuresis with resultant reduction of oxygen requirement to 6L via NC. She is now transferred to [facility] for further management.     Initial presentation of hypoxia and elevated BNP I/s/o hypertension noted to the SBPs in the 220s with improvement with IV diuresis is c/w flash pulmonary  edema 2/2 HTN emergency. However, she c/w bilateral pleural effusions of unclear etiology. Given her normal echocardiogram (LVEF hyperdynamic with no evidence of diastolic dysfunction and RVSP wnl) less likely that her effusions as 2/2 ADHF. Her infectious workup has been unrevealing to date and she without clear signs of infectious etiology as she is afebrile and without leukocytosis. It is clear that her AHRF is 2/2 these effusions and she would benefit from thoracentesis both therapeutically and diagnostically. Would favor IR guided thoracentesis if unable to complete at bedside. Will plan for diuretic holiday today I/s/o VENKAT on CKD with low c/f CRS (euvolemic on exam). Can consider spot doses as needed to wean O2 requirements.     Updates 12/28:  - Plan for thoracentesis tomorrow in bronch suite  - LE DVT ultrasound  - Encourage incentive spirometry and acapella use   - Will trend procal with AM labs tomorrow     #AHRF  #Bilateral pleural effusion of unclear etiology    #c/f flash pulmonary edema vs ADHF I/s/o hypertensive emergency   #c/f atypical PNA  :: CXR on admission withLow lung volumes with resultant bronchovascular crowding. Hazy left basilar opacity with blunting of the left costophrenic angle which may represent atelectasis or infiltrate. No evidence of sizeable pleural effusion.  ::CTPE (01/25)- no evidence of acute PE, but with  Cardiomegaly and large bilateral pleural effusion and bilateral atelectatic/consolidative opacities and also groundglass opacities  and septal thickening compatible with edema. Also with asymmetric left upper lobe consolidative opacity concerning for superimposed pneumonia.  :: Procal 0.14, ESR 59, CRP 0.21  :: Flu, COVID- negative, RSV pending, MRSA negative    :: Strep pneumo, legionella negative   ::Bcx with NGTD  :: , troponin wnl   :: Per MICU note bedside POCUS with no visible area for thoracentesis   :: s/p IV lasix 40 mg x 3  :: Echocardiogram (01/25)- LVEF  70-75% with no RWMA, LV septal wall thickness mildly increased with concentric LVH; however, with a normal pattern of LV diastolic filling. Also with normal RV global systolic function, RSVP wnl.   Plan:  - wean O2 as tolerated, goal O2 sat >92%  - thoracentesis (added on to bronch suite schedule 1/29) to evaluate etiology of bilateral pleural effusion  - Diuretic holiday given worsening renal function and apparent euvolemia on exam.    - C/w Azithromycin (end 01/28) and Ceftriaxone (Plan for 6 weeks treatment, 02/21/24- as per below)      #HTN, Uncontrolled I/s/o medication noncompliance   # Hypertensive emergency (AHDF vs flash pulmonary edema?), resolved   ::Home regimen: Lisinopril 40 mg every day, Norvasc 10 mg every day, Clonidine 0.1 mg every day  :: Echocardiogram as above   Plan:  - holding home lisinopril due to recent VENKAT  - cont Amlodipine and Clonidine for BP control   - Would favor optimization of her home going BP regimen with long acting agents      #DLD  ::Last lipid panel 05/2023- cholesterol 174, HDL 38.5, LDL 98,   :: NM stress test 10/2019 Normal myocardial perfusion study without evidence of ischemia or  prior infarction.  The 10-year ASCVD risk score (Sunshine DK, et al., 2019) is: 13.5%    Values used to calculate the score:      Age: 58 years      Sex: Female      Is Non- : Yes      Diabetic: Yes      Tobacco smoker: No      Systolic Blood Pressure: 120 mmHg      Is BP treated: Yes      HDL Cholesterol: 38.5 mg/dL      Total Cholesterol: 174 mg/dL     Plan:  - c/w Lipitor 40 mg every day, aspirin 81 mg every day     #Right Great toe amputation 01/12/2023, with Group B, Candida, MSSA infection   ::Cultures Previous cx:  01/09 Urine cx Group B  01/10 Foot ulcer MSSA, Sadaf G.   01/12 R first digit cx Group B strep  Plan:  - Cont Ceftriaxone 01/25- STOP 02/21/24      #Stage 2 non-oliguric VENKAT on CKD III  :: Baseline creatinine 0.8   :: Peaked at 5.3 creatinine  during last admission (01/14)  :: Evaluated by Nephrology last hospitalization who felt Likely acute tubular necrosis multifactorial in the setting of hypotension, infection, IV contrast, Vancomycin and Zosyn with no indications for HD   :: 1.42 on admission with initial improvement to 1.3 following IV diuresis, now uptrending to 1.83  Plan:  - Diuretic holiday, intravascular volume depletion likely contributing   - Consider workup UA/ Ulytes with Urea/ Renal US if fails to improve      #TIIDM, uncontrolled   #Neuropathy   :: A1C 13.1 on admission   ::home regimen: Lantus 70, Humalog 10 u TID and metformin 500 mg BID  Impression: Suspect medication noncompliance given current A1C and history of noncompliance with antihypertensive meds   Plan:  - Lantus 35 Units at bedtime, SSI#2, titrate to goal BS of 140-180  - Cont home gabapentin 300 mg BID      #Anemia of Chronic disease   :: Baseline Hgb 11-12   ::Iron studies (01/2024)- Iron 59, TIBC 172, %saturation 34  :: Ferritin 276 (01/2024)  Plan:  - CTM with daily CBC      #Insomnia  -cont melatonin for nsomnia      F PRN  E PRN, K>4, Mg>2  N Carb control diet  A: Serena catheter      GI Ppx: no indicated  DVT Ppx: subcutaneous heparin      CODE status FULL CODE   NOK: Sister (Mone): [phone]

## 2024-01-29 ENCOUNTER — APPOINTMENT (OUTPATIENT)
Dept: VASCULAR MEDICINE | Facility: HOSPITAL | Age: 59
DRG: 186 | End: 2024-01-29
Payer: COMMERCIAL

## 2024-01-29 ENCOUNTER — APPOINTMENT (OUTPATIENT)
Dept: GASTROENTEROLOGY | Facility: HOSPITAL | Age: 59
DRG: 186 | End: 2024-01-29
Payer: COMMERCIAL

## 2024-01-29 LAB
ABO GROUP (TYPE) IN BLOOD: NORMAL
ALBUMIN SERPL BCP-MCNC: 3.1 G/DL (ref 3.4–5)
ANION GAP SERPL CALC-SCNC: 13 MMOL/L (ref 10–20)
ANTIBODY SCREEN: NORMAL
APPEARANCE UR: ABNORMAL
APTT PPP: 34 SECONDS (ref 27–38)
BACTERIA BLD CULT: NORMAL
BACTERIA BLD CULT: NORMAL
BASOPHILS # BLD AUTO: 0.04 X10*3/UL (ref 0–0.1)
BASOPHILS NFR BLD AUTO: 0.7 %
BILIRUB UR STRIP.AUTO-MCNC: NEGATIVE MG/DL
BUN SERPL-MCNC: 33 MG/DL (ref 6–23)
CALCIUM SERPL-MCNC: 9 MG/DL (ref 8.6–10.6)
CHLORIDE SERPL-SCNC: 104 MMOL/L (ref 98–107)
CHLORIDE UR-SCNC: 17 MMOL/L
CHLORIDE/CREATININE (MMOL/G) IN URINE: 26 MMOL/G CREAT (ref 38–318)
CO2 SERPL-SCNC: 28 MMOL/L (ref 21–32)
COLOR UR: YELLOW
CREAT SERPL-MCNC: 1.93 MG/DL (ref 0.5–1.05)
CREAT UR-MCNC: 64.7 MG/DL (ref 20–320)
CREAT UR-MCNC: 64.7 MG/DL (ref 20–320)
EGFRCR SERPLBLD CKD-EPI 2021: 30 ML/MIN/1.73M*2
EOSINOPHIL # BLD AUTO: 0.24 X10*3/UL (ref 0–0.7)
EOSINOPHIL NFR BLD AUTO: 4.4 %
ERYTHROCYTE [DISTWIDTH] IN BLOOD BY AUTOMATED COUNT: 13.3 % (ref 11.5–14.5)
FUNGUS SPEC CULT: NORMAL
FUNGUS SPEC FUNGUS STN: NORMAL
GLUCOSE BLD MANUAL STRIP-MCNC: 105 MG/DL (ref 74–99)
GLUCOSE BLD MANUAL STRIP-MCNC: 187 MG/DL (ref 74–99)
GLUCOSE BLD MANUAL STRIP-MCNC: 240 MG/DL (ref 74–99)
GLUCOSE BLD MANUAL STRIP-MCNC: 254 MG/DL (ref 74–99)
GLUCOSE BLD MANUAL STRIP-MCNC: 70 MG/DL (ref 74–99)
GLUCOSE SERPL-MCNC: 178 MG/DL (ref 74–99)
GLUCOSE UR STRIP.AUTO-MCNC: ABNORMAL MG/DL
HCT VFR BLD AUTO: 26.3 % (ref 36–46)
HGB BLD-MCNC: 8.6 G/DL (ref 12–16)
IMM GRANULOCYTES # BLD AUTO: 0.01 X10*3/UL (ref 0–0.7)
IMM GRANULOCYTES NFR BLD AUTO: 0.2 % (ref 0–0.9)
INR PPP: 1.1 (ref 0.9–1.1)
KETONES UR STRIP.AUTO-MCNC: NEGATIVE MG/DL
LEUKOCYTE ESTERASE UR QL STRIP.AUTO: ABNORMAL
LYMPHOCYTES # BLD AUTO: 1.84 X10*3/UL (ref 1.2–4.8)
LYMPHOCYTES NFR BLD AUTO: 33.8 %
MAGNESIUM SERPL-MCNC: 2.09 MG/DL (ref 1.6–2.4)
MCH RBC QN AUTO: 30.2 PG (ref 26–34)
MCHC RBC AUTO-ENTMCNC: 32.7 G/DL (ref 32–36)
MCV RBC AUTO: 92 FL (ref 80–100)
MONOCYTES # BLD AUTO: 0.49 X10*3/UL (ref 0.1–1)
MONOCYTES NFR BLD AUTO: 9 %
NEUTROPHILS # BLD AUTO: 2.83 X10*3/UL (ref 1.2–7.7)
NEUTROPHILS NFR BLD AUTO: 51.9 %
NITRITE UR QL STRIP.AUTO: NEGATIVE
NRBC BLD-RTO: 0 /100 WBCS (ref 0–0)
PH UR STRIP.AUTO: 6 [PH]
PHOSPHATE SERPL-MCNC: 5.1 MG/DL (ref 2.5–4.9)
PLATELET # BLD AUTO: 419 X10*3/UL (ref 150–450)
POTASSIUM SERPL-SCNC: 4.2 MMOL/L (ref 3.5–5.3)
POTASSIUM UR-SCNC: 15 MMOL/L
POTASSIUM/CREAT UR-RTO: 23 MMOL/G CREAT
PROCALCITONIN SERPL-MCNC: 0.05 NG/ML
PROT UR STRIP.AUTO-MCNC: ABNORMAL MG/DL
PROTHROMBIN TIME: 12.1 SECONDS (ref 9.8–12.8)
RBC # BLD AUTO: 2.85 X10*6/UL (ref 4–5.2)
RBC # UR STRIP.AUTO: ABNORMAL /UL
RBC #/AREA URNS AUTO: NORMAL /HPF
RH FACTOR (ANTIGEN D): NORMAL
SODIUM SERPL-SCNC: 141 MMOL/L (ref 136–145)
SODIUM UR-SCNC: 29 MMOL/L
SODIUM/CREAT UR-RTO: 45 MMOL/G CREAT
SP GR UR STRIP.AUTO: 1.01
SQUAMOUS #/AREA URNS AUTO: NORMAL /HPF
UREA/CREAT UR-SRTO: 6.1 G/G CREAT
UROBILINOGEN UR STRIP.AUTO-MCNC: <2 MG/DL
UUN UR-MCNC: 392 MG/DL
WBC # BLD AUTO: 5.5 X10*3/UL (ref 4.4–11.3)
WBC #/AREA URNS AUTO: NORMAL /HPF

## 2024-01-29 PROCEDURE — 82947 ASSAY GLUCOSE BLOOD QUANT: CPT

## 2024-01-29 PROCEDURE — 1100000001 HC PRIVATE ROOM DAILY

## 2024-01-29 PROCEDURE — 87086 URINE CULTURE/COLONY COUNT: CPT

## 2024-01-29 PROCEDURE — 85610 PROTHROMBIN TIME: CPT

## 2024-01-29 PROCEDURE — 83735 ASSAY OF MAGNESIUM: CPT

## 2024-01-29 PROCEDURE — 2500000004 HC RX 250 GENERAL PHARMACY W/ HCPCS (ALT 636 FOR OP/ED)

## 2024-01-29 PROCEDURE — 84540 ASSAY OF URINE/UREA-N: CPT

## 2024-01-29 PROCEDURE — 2500000001 HC RX 250 WO HCPCS SELF ADMINISTERED DRUGS (ALT 637 FOR MEDICARE OP)

## 2024-01-29 PROCEDURE — 36415 COLL VENOUS BLD VENIPUNCTURE: CPT

## 2024-01-29 PROCEDURE — 93970 EXTREMITY STUDY: CPT | Performed by: SURGERY

## 2024-01-29 PROCEDURE — 97165 OT EVAL LOW COMPLEX 30 MIN: CPT | Mod: GO

## 2024-01-29 PROCEDURE — 99232 SBSQ HOSP IP/OBS MODERATE 35: CPT

## 2024-01-29 PROCEDURE — 81001 URINALYSIS AUTO W/SCOPE: CPT

## 2024-01-29 PROCEDURE — 84145 PROCALCITONIN (PCT): CPT

## 2024-01-29 PROCEDURE — 80069 RENAL FUNCTION PANEL: CPT

## 2024-01-29 PROCEDURE — 85025 COMPLETE CBC W/AUTO DIFF WBC: CPT

## 2024-01-29 PROCEDURE — 86900 BLOOD TYPING SEROLOGIC ABO: CPT

## 2024-01-29 PROCEDURE — 76604 US EXAM CHEST: CPT | Performed by: INTERNAL MEDICINE

## 2024-01-29 PROCEDURE — 97161 PT EVAL LOW COMPLEX 20 MIN: CPT | Mod: GP | Performed by: PHYSICAL THERAPIST

## 2024-01-29 PROCEDURE — 82436 ASSAY OF URINE CHLORIDE: CPT

## 2024-01-29 PROCEDURE — 93970 EXTREMITY STUDY: CPT

## 2024-01-29 RX ORDER — ACETAMINOPHEN 325 MG/1
975 TABLET ORAL ONCE
Status: COMPLETED | OUTPATIENT
Start: 2024-01-29 | End: 2024-01-29

## 2024-01-29 RX ORDER — CLONIDINE HYDROCHLORIDE 0.1 MG/1
0.1 TABLET ORAL 2 TIMES DAILY
Status: DISCONTINUED | OUTPATIENT
Start: 2024-01-29 | End: 2024-01-31 | Stop reason: HOSPADM

## 2024-01-29 RX ADMIN — ASPIRIN 81 MG CHEWABLE TABLET 81 MG: 81 TABLET CHEWABLE at 06:00

## 2024-01-29 RX ADMIN — CLONIDINE HYDROCHLORIDE 0.1 MG: 0.1 TABLET ORAL at 09:36

## 2024-01-29 RX ADMIN — CEFTRIAXONE SODIUM 2 G: 2 INJECTION, SOLUTION INTRAVENOUS at 09:34

## 2024-01-29 RX ADMIN — GABAPENTIN 300 MG: 300 CAPSULE ORAL at 09:34

## 2024-01-29 RX ADMIN — ATORVASTATIN CALCIUM 40 MG: 40 TABLET, FILM COATED ORAL at 09:34

## 2024-01-29 RX ADMIN — GABAPENTIN 300 MG: 300 CAPSULE ORAL at 21:11

## 2024-01-29 RX ADMIN — INSULIN GLARGINE 35 UNITS: 100 INJECTION, SOLUTION SUBCUTANEOUS at 21:11

## 2024-01-29 RX ADMIN — AMLODIPINE BESYLATE 10 MG: 10 TABLET ORAL at 09:34

## 2024-01-29 RX ADMIN — CLONIDINE HYDROCHLORIDE 0.1 MG: 0.1 TABLET ORAL at 21:11

## 2024-01-29 RX ADMIN — HEPARIN SODIUM 7500 UNITS: 5000 INJECTION INTRAVENOUS; SUBCUTANEOUS at 21:10

## 2024-01-29 RX ADMIN — INSULIN LISPRO 4 UNITS: 100 INJECTION, SOLUTION INTRAVENOUS; SUBCUTANEOUS at 17:25

## 2024-01-29 RX ADMIN — ACETAMINOPHEN 975 MG: 325 TABLET ORAL at 21:40

## 2024-01-29 ASSESSMENT — COGNITIVE AND FUNCTIONAL STATUS - GENERAL
TURNING FROM BACK TO SIDE WHILE IN FLAT BAD: A LITTLE
MOVING TO AND FROM BED TO CHAIR: A LITTLE
DRESSING REGULAR LOWER BODY CLOTHING: A LOT
TOILETING: A LITTLE
STANDING UP FROM CHAIR USING ARMS: A LOT
PERSONAL GROOMING: A LITTLE
WALKING IN HOSPITAL ROOM: A LOT
EATING MEALS: A LOT
PERSONAL GROOMING: A LITTLE
DRESSING REGULAR UPPER BODY CLOTHING: A LITTLE
TURNING FROM BACK TO SIDE WHILE IN FLAT BAD: A LITTLE
DRESSING REGULAR LOWER BODY CLOTHING: A LOT
DAILY ACTIVITIY SCORE: 17
WALKING IN HOSPITAL ROOM: A LITTLE
HELP NEEDED FOR BATHING: A LOT
STANDING UP FROM CHAIR USING ARMS: A LITTLE
HELP NEEDED FOR BATHING: A LOT
DRESSING REGULAR UPPER BODY CLOTHING: A LITTLE
DRESSING REGULAR UPPER BODY CLOTHING: A LOT
MOBILITY SCORE: 19
MOBILITY SCORE: 12
MOVING TO AND FROM BED TO CHAIR: A LOT
MOBILITY SCORE: 19
DAILY ACTIVITIY SCORE: 17
TOILETING: A LITTLE
MOVING TO AND FROM BED TO CHAIR: A LITTLE
CLIMB 3 TO 5 STEPS WITH RAILING: A LITTLE
TURNING FROM BACK TO SIDE WHILE IN FLAT BAD: A LOT
CLIMB 3 TO 5 STEPS WITH RAILING: A LITTLE
STANDING UP FROM CHAIR USING ARMS: A LITTLE
MOVING FROM LYING ON BACK TO SITTING ON SIDE OF FLAT BED WITH BEDRAILS: A LOT
WALKING IN HOSPITAL ROOM: A LITTLE
HELP NEEDED FOR BATHING: A LOT
CLIMB 3 TO 5 STEPS WITH RAILING: A LOT
DRESSING REGULAR LOWER BODY CLOTHING: A LOT

## 2024-01-29 ASSESSMENT — PAIN - FUNCTIONAL ASSESSMENT
PAIN_FUNCTIONAL_ASSESSMENT: 0-10
PAIN_FUNCTIONAL_ASSESSMENT: CPOT (CRITICAL CARE PAIN OBSERVATION TOOL)
PAIN_FUNCTIONAL_ASSESSMENT: 0-10

## 2024-01-29 ASSESSMENT — COLUMBIA-SUICIDE SEVERITY RATING SCALE - C-SSRS
2. HAVE YOU ACTUALLY HAD ANY THOUGHTS OF KILLING YOURSELF?: NO
1. IN THE PAST MONTH, HAVE YOU WISHED YOU WERE DEAD OR WISHED YOU COULD GO TO SLEEP AND NOT WAKE UP?: NO
6. HAVE YOU EVER DONE ANYTHING, STARTED TO DO ANYTHING, OR PREPARED TO DO ANYTHING TO END YOUR LIFE?: NO

## 2024-01-29 ASSESSMENT — PAIN DESCRIPTION - LOCATION: LOCATION: LEG

## 2024-01-29 ASSESSMENT — ACTIVITIES OF DAILY LIVING (ADL)
BATHING_ASSISTANCE: MODERATE
ADL_ASSISTANCE: INDEPENDENT
ADL_ASSISTANCE: INDEPENDENT

## 2024-01-29 ASSESSMENT — PAIN SCALES - GENERAL
PAINLEVEL_OUTOF10: 0 - NO PAIN
PAINLEVEL_OUTOF10: 0 - NO PAIN
PAINLEVEL_OUTOF10: 8
PAINLEVEL_OUTOF10: 0 - NO PAIN
PAINLEVEL_OUTOF10: 4
PAINLEVEL_OUTOF10: 0 - NO PAIN

## 2024-01-29 ASSESSMENT — PAIN DESCRIPTION - ORIENTATION: ORIENTATION: RIGHT

## 2024-01-29 ASSESSMENT — PAIN SCALES - WONG BAKER: WONGBAKER_NUMERICALRESPONSE: HURTS WHOLE LOT

## 2024-01-29 NOTE — CARE PLAN
The patient's goals for the shift include  no difficulty with breathing      The clinical goals for the shift include Patient in not have any issues of shortness of breath and remain greater than 93% on 6L O2    Over the shift, the patient did make progress toward the following goals. Patient was able to transfer to bedside commode and lay in bed resting without any issues overnight.

## 2024-01-29 NOTE — SIGNIFICANT EVENT
Patient presented for thoracentesis. Chest ultrasound was performed, revealed small amount of fluid on the right side, trace amount of fluid on the left side. Given she reports symptoms are overall improved on Lasix, procedure was cancelled.

## 2024-01-29 NOTE — CARE PLAN
The patient's goals for the shift include      The clinical goals for the shift include Pt will have no c/o of SOB  Pulse-ox >90% this shift

## 2024-01-29 NOTE — PROGRESS NOTES
Occupational Therapy    Evaluation    Patient Name: Nuris Squires  MRN: [id number]  Today's Date: 1/29/2024  Time Calculation  Start Time: 1224  Stop Time: 1237  Time Calculation (min): 13 min        Assessment:  Prognosis: Good  Barriers to Discharge: None  Evaluation/Treatment Tolerance: Patient limited by fatigue  Medical Staff Made Aware: Yes  End of Session Communication: Bedside nurse  End of Session Patient Position: Up in chair, Alarm off, not on at start of session  OT Assessment Results: Decreased ADL status, Decreased endurance, Decreased functional mobility  Prognosis: Good  Barriers to Discharge: None  Evaluation/Treatment Tolerance: Patient limited by fatigue  Medical Staff Made Aware: Yes  Strengths: Attitude of self, Ability to acquire knowledge, Support of Caregivers  Barriers to Participation: Comorbidities  Plan:  Treatment Interventions: ADL retraining, Functional transfer training, Endurance training, Patient/family training, Equipment evaluation/education, Compensatory technique education  OT Frequency: 2 times per week  OT Discharge Recommendations: Low intensity level of continued care  Equipment Recommended upon Discharge: Wheeled walker  OT Recommended Transfer Status: Assist of 1  OT - OK to Discharge:  (eval complete)  Treatment Interventions: ADL retraining, Functional transfer training, Endurance training, Patient/family training, Equipment evaluation/education, Compensatory technique education    Subjective   Current Problem:  1. Acute respiratory failure with hypoxia (CMS/HCC)  Follow Up In [facility] - PCP    Vascular US Lower Extremity Venous Duplex Bilateral    Vascular US Lower Extremity Venous Duplex Bilateral    Thoracentesis    Thoracentesis      2. Shortness of breath        3. Hypoxia        4. Pleural effusion  Transthoracic Echo (TTE) Complete    Transthoracic Echo (TTE) Complete    Thoracentesis    Thoracentesis      5. Pneumonia due to infectious organism,  unspecified laterality, unspecified part of lung        6. Acute pulmonary edema (CMS/HCC)        7. 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 Right foot infection  Follow Up In [facility] - PCP      9. Leg swelling  Vascular US Lower Extremity Venous Duplex Bilateral    Vascular US Lower Extremity Venous Duplex Bilateral        General:  General  Reason for Referral: presented to the ED with shortness of breath found to have SBPs into the 200s and radiographic (B/l pleural effusions) and laboratory evidence (BNP>300 supportive of fluid overloaded state. Patient admitted to MICU for respiratory support on airvo and underwent IV diuresis  Past Medical History Relevant to Rehab: pmhx of HTN, CKD 3, DM2 and recent right great toe amputation (1/12/23)  Family/Caregiver Present: No  Co-Treatment: PT  Co-Treatment Reason: Co-evaluation with PT for pt safety and to optimize pt's therapeutic potential  Prior to Session Communication: Bedside nurse  Patient Position Received: Bed, 2 rail up, Alarm off, not on at start of session  General Comment: Pt plesant and agreeable to therapy  Precautions:  LE Weight Bearing Status: Heel Weight Bearing in Post-Op Shoe (RLE)  Medical Precautions: Fall precautions  Vital Signs:  Heart Rate:  (Pre: 61 ambulation: 81 Post:78)  Heart Rate Source: Monitor  SpO2:  (Pre: 98% ambulation: 91% post: 93%)  Pain:  Pain Assessment  Pain Assessment: 0-10  Pain Score: 0 - No pain    Objective   Cognition:  Overall Cognitive Status: Within Functional Limits  Arousal/Alertness: Appropriate responses to stimuli  Orientation Level: Oriented X4  Following Commands: Follows all commands and directions without difficulty  Safety Judgment: Good awareness of safety precautions  Problem Solving: Able to problem solve independently  Insight: Mild           Home Living:  Type of Home: House  Lives  With: Spouse  Home Adaptive Equipment: Walker rolling or standard (standard walker)  Home Layout: One level  Home Access: Stairs to enter with rails  Entrance Stairs-Number of Steps: 5  Prior Function:  Level of [place]: Independent with ADLs and functional transfers, Independent with homemaking with ambulation  ADL Assistance: Independent  Homemaking Assistance: Independent  Ambulatory Assistance: Independent (has been using std walker)  Vocational: Full time employment ([employer] )    ADL:  Eating Assistance: Independent  Grooming Assistance: Stand by  Grooming Deficit: Setup (anticipated)  Bathing Assistance: Moderate  Bathing Deficit:  (anticipated for LB)  UE Dressing Assistance: Stand by  UE Dressing Deficit:  (gown)  LE Dressing Assistance: Maximal  LE Dressing Deficit:  (don post-op shoe)  Toileting Assistance with Device: Stand by  Toileting Deficit:  (anticipated for safety)  Functional Assistance:  (CGA)  ADL Comments: Pt completed short functional distance with CGA +FWW, verbal cueing for walker management and pacing for endurance purposes  Activity Tolerance:  Endurance: Tolerates 10 - 20 min exercise with multiple rests  Bed Mobility/Transfers: Bed Mobility  Bed Mobility: Yes  Bed Mobility 1  Bed Mobility 1: Supine to sitting  Level of Assistance 1:  (SBA)  Bed Mobility Comments 1: HOB elevated    Transfers  Transfer: Yes  Transfer 1  Transfer From 1: Bed to  Transfer to 1: Stand  Technique 1: Sit to stand  Transfer Device 1: Walker  Transfer Level of Assistance 1: Contact guard  Trials/Comments 1: Verbal cueing for mechanics  Transfers 2  Transfer From 2: Stand to  Transfer to 2: Chair with arms  Technique 2: Stand to sit  Transfer Device 2: Walker  Transfer Level of Assistance 2: Contact guard     Vision:Vision - Basic Assessment  Current Vision: No visual deficits  Sensation:  Light Touch: No apparent deficits  Strength:  Strength Comments: Hx of R shoulder degenerative changes, functional  strength >=3+/5, LUE WFL  Perception:  Inattention/Neglect: Appears intact  Initiation: Appears intact  Motor Planning: Appears intact  Perseveration: Not present  Coordination:  Movements are Fluid and Coordinated: Yes   Hand Function:  Gross Grasp: Functional  Coordination: Functional  Extremities: RUE   RUE : Exceptions to WFL  RUE AROM (degrees)  RUE AROM Comment: R shoulder ROM ~90 degrees, all other joints WFL and LUE   LUE: Within Functional Limits    Outcome Measures:[facility] Daily Activity  Putting on and taking off regular lower body clothing: A lot  Bathing (including washing, rinsing, drying): A lot  Putting on and taking off regular upper body clothing: A little  Toileting, which includes using toilet, bedpan or urinal: A little  Taking care of personal grooming such as brushing teeth: A little  Eating Meals: None  Daily Activity - Total Score: 17    Brief Confusion Assessment Method (bCAM)  Feature 1: Altered Mental Status or Fluctuating Course: No  CAM Result: CAM -    Education Documentation  Body Mechanics, taught by Nikky George OT at 1/29/2024  2:25 PM.  Learner: Patient  Readiness: Acceptance  Method: Explanation, Demonstration  Response: Verbalizes Understanding, Demonstrated Understanding, Needs Reinforcement    Precautions, taught by Nikky George OT at 1/29/2024  2:25 PM.  Learner: Patient  Readiness: Acceptance  Method: Explanation, Demonstration  Response: Verbalizes Understanding, Demonstrated Understanding, Needs Reinforcement    ADL Training, taught by Nikky George OT at 1/29/2024  2:25 PM.  Learner: Patient  Readiness: Acceptance  Method: Explanation, Demonstration  Response: Verbalizes Understanding, Demonstrated Understanding, Needs Reinforcement    EDUCATION:  Education  Individual(s) Educated: Patient  Education Provided: Diagnosis & Precautions, Risk and benefits of OT discussed with patient or other, POC discussed and agreed upon  Patient Response to Education:  Patient/Caregiver Verbalized Understanding of Information    Goals:  Encounter Problems       Encounter Problems (Active)       ADLs       Patient with complete lower body dressing with modified independent level of assistance donning and doffing all LE clothes  with PRN adaptive equipment (Progressing)       Start:  01/29/24    Expected End:  02/12/24            Patient will complete daily grooming tasks with modified independent level of assistance and PRN adaptive equipment while standing. (Progressing)       Start:  01/29/24    Expected End:  02/12/24            Patient will complete toileting including hygiene clothing management/hygiene with independent level of assistance. (Progressing)       Start:  01/29/24    Expected End:  02/12/24               COGNITION/SAFETY       Patient will recall and adhere to weight bearing restrictions with all ADL and functional mobility in order to promote healing and safety with functional tasks (Progressing)       Start:  01/29/24    Expected End:  02/12/24               MOBILITY       Patient will perform Functional mobility Household distances/Community Distances with modified independent level of assistance and least restrictive device in order to improve safety and functional mobility. (Progressing)       Start:  01/29/24    Expected End:  02/12/24                 TRANSFERS       Patient will complete functional transfer to chair/commode with least restrictive device with modified independent level of assistance. (Progressing)       Start:  01/29/24    Expected End:  02/12/24

## 2024-01-29 NOTE — PROGRESS NOTES
TCC attempted to meet pt to discuss care team reccs for LOW intensity but pt not in her room at this time. Per pt's RN, pt currently getting a thoracentesis. TCC will follow up with pt at a later time.

## 2024-01-29 NOTE — PROGRESS NOTES
Physical Therapy    Physical Therapy Evaluation    Patient Name: Nuris Squires  MRN: [id number]  Today's Date: 1/29/2024   Time Calculation  Start Time: 1224  Stop Time: 1237  Time Calculation (min): 13 min    Assessment/Plan   PT Assessment  PT Assessment Results: Decreased strength, Decreased endurance, Impaired balance, Decreased mobility  Rehab Prognosis: Good  Evaluation/Treatment Tolerance: Patient tolerated treatment well  End of Session Communication: Bedside nurse  Assessment Comment: Pt. is a 57 y/o F admitted with SOB. Pt. presents with weakness, impaired endurance, impaired balance, and difficulty with functional mobility compared to baseline. Pt. would benefit from skilled PT while IP to address these deficits.  End of Session Patient Position: Up in chair, Alarm off, not on at start of session  IP OR SWING BED PT PLAN  Inpatient or Swing Bed: Inpatient  PT Plan  Treatment/Interventions: Bed mobility, Transfer training, Stair training, Gait training, Balance training, Strengthening, Endurance training, Therapeutic exercise, Therapeutic activity, Home exercise program  PT Plan: Skilled PT  PT Frequency: 3 times per week  PT Discharge Recommendations: Low intensity level of continued care  Equipment Recommended upon Discharge: Wheeled walker  PT Recommended Transfer Status: Assist x1  PT - OK to Discharge: Yes (Pt evaluation complete and rehab recommendations made.)    Subjective       General Visit Information:  General  Reason for Referral: presented to the ED with shortness of breath found to have SBPs into the 200s and radiographic (B/l pleural effusions) and laboratory evidence (BNP>300 supportive of fluid overloaded state. Patient admitted to MICU for respiratory support on airvo and underwent IV diuresis  Past Medical History Relevant to Rehab: pmhx of HTN, CKD 3, DM2 and recent right great toe amputation (1/12/23)  Family/Caregiver Present: No  Co-Treatment: OT  Co-Treatment Reason: to maximize  safety and facilitate DC planning  Prior to Session Communication: Bedside nurse  Patient Position Received: Bed, 2 rail up, Alarm off, not on at start of session  General Comment: Pt. supine in bed, pleasant and agreeable to participate in session.    Home Living:  Home Living  Type of Home: House  Lives With: Spouse  Home Adaptive Equipment: Walker rolling or standard  Home Layout: One level  Home Access: Stairs to enter with rails  Entrance Stairs-Number of Steps: 5    Prior Level of Function:  Prior Function Per Pt/Caregiver Report  Level of [place]: Independent with ADLs and functional transfers, Independent with homemaking with ambulation  ADL Assistance: Independent  Ambulatory Assistance: Independent (has been ambulating with SW s/p amputation)  Transfers: Independent  Vocational: Full time employment ([employer] )    Precautions:  Precautions  LE Weight Bearing Status: Heel Weight Bearing in Post-Op Shoe  Medical Precautions: Fall precautions    Vital Signs:  Vital Signs  Heart Rate:  (Pre: 61 ambulation: 81 Post:78)  Heart Rate Source: Monitor  SpO2:  (Pre: 98% ambulation: 91% post: 93%)  Objective     Pain:  Pain Assessment  Pain Assessment: 0-10  Pain Score: 0 - No pain    Cognition:  Cognition  Overall Cognitive Status: Within Functional Limits    General Assessments:      Activity Tolerance  Endurance: Tolerates 10 - 20 min exercise with multiple rests (continues to require supplemental O2: 4L via NC)  Sensation  Light Touch: No apparent deficits  Strength  Strength Comments: B LE's grossly 4+/5 throughout           Static Sitting Balance  Static Sitting-Balance Support: Feet supported  Static Sitting-Level of Assistance: Distant supervision  Dynamic Sitting Balance  Dynamic Sitting-Balance Support: Feet supported  Dynamic Sitting-Balance:  (SBA for LE MMT)  Static Standing Balance  Static Standing-Balance Support: Bilateral upper extremity supported (on RW)  Static Standing-Level of Assistance:  Contact guard  Dynamic Standing Balance  Dynamic Standing-Balance Support: Bilateral upper extremity supported (with RW)  Dynamic Standing-Balance:  (CGA for hallway ambulation)    Functional Assessments:     Bed Mobility  Bed Mobility: Yes  Bed Mobility 1  Bed Mobility 1: Supine to sitting  Level of Assistance 1:  (SBA)  Transfers  Transfer: Yes  Transfer 1  Technique 1: Sit to stand, Stand to sit  Transfer Device 1: Walker  Transfer Level of Assistance 1: Contact guard  Trials/Comments 1: cues for sequencing with RW, heavy anterior weight shift to assume upright stand  Ambulation/Gait Training  Ambulation/Gait Training Performed: Yes  Ambulation/Gait Training 1  Surface 1: Level tile  Device 1: Rolling walker  Assistance 1: Contact guard  Quality of Gait 1:  (decreased gait speed, SPO2 >90% on 4L O2 via NC. Required one standing rest break in burrows before ambulating back to room. no LOB/postural sway observed.)  Comments/Distance (ft) 1: 45 ft  Stairs  Stairs: No                     Outcome Measures:  [facility] Basic Mobility  Turning from your back to your side while in a flat bed without using bedrails: None  Moving from lying on your back to sitting on the side of a flat bed without using bedrails: A little  Moving to and from bed to chair (including a wheelchair): A little  Standing up from a chair using your arms (e.g. wheelchair or bedside chair): A little  To walk in hospital room: A little  Climbing 3-5 steps with railing: A little  Basic Mobility - Total Score: 19                            Goals:  Encounter Problems       Encounter Problems (Active)       Balance       Pt. will tolerate > 30 min of OOB activity with VSS        Start:  01/29/24    Expected End:  02/12/24       INTERVENTIONS:  1. Practice standing with minimal support.  2. Educate patient about standing tolerance.  3. Educate patient about independence with gait, transfers, and ADL's.  4. Educate patient about use of assistive device.  5.  Educate patient about self-directed care.            Mobility       STG - Patient will ambulate > 150 ft. with VSS and LRD        Start:  01/29/24    Expected End:  02/12/24            STG - Patient will ascend and descend four to six stairs with SBA        Start:  01/29/24    Expected End:  02/12/24               Transfers       STG - Patient will transfer sit to and from stand independently with LRD        Start:  01/29/24    Expected End:  02/12/24                 Education Documentation  Precautions, taught by Tiffany Fuentes, PT at 1/29/2024  1:45 PM.  Learner: Patient  Readiness: Acceptance  Method: Explanation  Response: Verbalizes Understanding    Body Mechanics, taught by Tiffany Fuentes, PT at 1/29/2024  1:45 PM.  Learner: Patient  Readiness: Acceptance  Method: Explanation  Response: Verbalizes Understanding    Mobility Training, taught by Tiffany Fuentes, PT at 1/29/2024  1:45 PM.  Learner: Patient  Readiness: Acceptance  Method: Explanation  Response: Verbalizes Understanding    Education Comments  No comments found.

## 2024-01-30 ENCOUNTER — HOME CARE VISIT (OUTPATIENT)
Dept: HOME HEALTH SERVICES | Facility: HOME HEALTH | Age: 59
End: 2024-01-30
Payer: COMMERCIAL

## 2024-01-30 ENCOUNTER — APPOINTMENT (OUTPATIENT)
Dept: RADIOLOGY | Facility: HOSPITAL | Age: 59
DRG: 186 | End: 2024-01-30
Payer: COMMERCIAL

## 2024-01-30 LAB
ADENOVIRUS RVP, VIRC: NOT DETECTED
ALBUMIN SERPL BCP-MCNC: 2.9 G/DL (ref 3.4–5)
ANION GAP SERPL CALC-SCNC: 14 MMOL/L (ref 10–20)
BASOPHILS # BLD AUTO: 0.05 X10*3/UL (ref 0–0.1)
BASOPHILS NFR BLD AUTO: 0.9 %
BUN SERPL-MCNC: 33 MG/DL (ref 6–23)
CALCIUM SERPL-MCNC: 9.2 MG/DL (ref 8.6–10.6)
CHLORIDE SERPL-SCNC: 106 MMOL/L (ref 98–107)
CO2 SERPL-SCNC: 27 MMOL/L (ref 21–32)
CREAT SERPL-MCNC: 1.78 MG/DL (ref 0.5–1.05)
EGFRCR SERPLBLD CKD-EPI 2021: 33 ML/MIN/1.73M*2
ENTEROVIRUS/RHINOVIRUS RVP, VIRC: NOT DETECTED
EOSINOPHIL # BLD AUTO: 0.34 X10*3/UL (ref 0–0.7)
EOSINOPHIL NFR BLD AUTO: 5.9 %
ERYTHROCYTE [DISTWIDTH] IN BLOOD BY AUTOMATED COUNT: 13.2 % (ref 11.5–14.5)
GLUCOSE BLD MANUAL STRIP-MCNC: 155 MG/DL (ref 74–99)
GLUCOSE BLD MANUAL STRIP-MCNC: 167 MG/DL (ref 74–99)
GLUCOSE BLD MANUAL STRIP-MCNC: 207 MG/DL (ref 74–99)
GLUCOSE BLD MANUAL STRIP-MCNC: 81 MG/DL (ref 74–99)
GLUCOSE SERPL-MCNC: 75 MG/DL (ref 74–99)
HCT VFR BLD AUTO: 27 % (ref 36–46)
HGB BLD-MCNC: 8.6 G/DL (ref 12–16)
HOLD SPECIMEN: NORMAL
HUMAN BOCAVIRUS RVP, VIRC: NOT DETECTED
HUMAN CORONAVIRUS RVP, VIRC: NOT DETECTED
IMM GRANULOCYTES # BLD AUTO: 0.01 X10*3/UL (ref 0–0.7)
IMM GRANULOCYTES NFR BLD AUTO: 0.2 % (ref 0–0.9)
INFLUENZA A , VIRC: NOT DETECTED
INFLUENZA A H1N1-09 , VIRC: NOT DETECTED
INFLUENZA B PCR, VIRC: NOT DETECTED
LYMPHOCYTES # BLD AUTO: 2.24 X10*3/UL (ref 1.2–4.8)
LYMPHOCYTES NFR BLD AUTO: 38.8 %
MAGNESIUM SERPL-MCNC: 2.08 MG/DL (ref 1.6–2.4)
MCH RBC QN AUTO: 30 PG (ref 26–34)
MCHC RBC AUTO-ENTMCNC: 31.9 G/DL (ref 32–36)
MCV RBC AUTO: 94 FL (ref 80–100)
METAPNEUMOVIRUS , VIRC: NOT DETECTED
MONOCYTES # BLD AUTO: 0.61 X10*3/UL (ref 0.1–1)
MONOCYTES NFR BLD AUTO: 10.6 %
NEUTROPHILS # BLD AUTO: 2.53 X10*3/UL (ref 1.2–7.7)
NEUTROPHILS NFR BLD AUTO: 43.6 %
NRBC BLD-RTO: 0 /100 WBCS (ref 0–0)
PARAINFLUENZA PCR, VIRC: NOT DETECTED
PHOSPHATE SERPL-MCNC: 5.1 MG/DL (ref 2.5–4.9)
PLATELET # BLD AUTO: 416 X10*3/UL (ref 150–450)
POTASSIUM SERPL-SCNC: 4.8 MMOL/L (ref 3.5–5.3)
RBC # BLD AUTO: 2.87 X10*6/UL (ref 4–5.2)
RSV PCR, RVP, VIRC: NOT DETECTED
SODIUM SERPL-SCNC: 142 MMOL/L (ref 136–145)
WBC # BLD AUTO: 5.8 X10*3/UL (ref 4.4–11.3)

## 2024-01-30 PROCEDURE — 83735 ASSAY OF MAGNESIUM: CPT

## 2024-01-30 PROCEDURE — 99232 SBSQ HOSP IP/OBS MODERATE 35: CPT

## 2024-01-30 PROCEDURE — 2500000004 HC RX 250 GENERAL PHARMACY W/ HCPCS (ALT 636 FOR OP/ED)

## 2024-01-30 PROCEDURE — 82947 ASSAY GLUCOSE BLOOD QUANT: CPT

## 2024-01-30 PROCEDURE — 2500000001 HC RX 250 WO HCPCS SELF ADMINISTERED DRUGS (ALT 637 FOR MEDICARE OP)

## 2024-01-30 PROCEDURE — 71046 X-RAY EXAM CHEST 2 VIEWS: CPT | Performed by: RADIOLOGY

## 2024-01-30 PROCEDURE — 71046 X-RAY EXAM CHEST 2 VIEWS: CPT

## 2024-01-30 PROCEDURE — 1100000001 HC PRIVATE ROOM DAILY

## 2024-01-30 PROCEDURE — 83021 HEMOGLOBIN CHROMOTOGRAPHY: CPT

## 2024-01-30 PROCEDURE — 85025 COMPLETE CBC W/AUTO DIFF WBC: CPT

## 2024-01-30 PROCEDURE — 80069 RENAL FUNCTION PANEL: CPT

## 2024-01-30 PROCEDURE — 83020 HEMOGLOBIN ELECTROPHORESIS: CPT

## 2024-01-30 RX ADMIN — HEPARIN SODIUM 7500 UNITS: 5000 INJECTION INTRAVENOUS; SUBCUTANEOUS at 06:01

## 2024-01-30 RX ADMIN — INSULIN LISPRO 4 UNITS: 100 INJECTION, SOLUTION INTRAVENOUS; SUBCUTANEOUS at 17:37

## 2024-01-30 RX ADMIN — ASPIRIN 81 MG CHEWABLE TABLET 81 MG: 81 TABLET CHEWABLE at 06:01

## 2024-01-30 RX ADMIN — CLONIDINE HYDROCHLORIDE 0.1 MG: 0.1 TABLET ORAL at 20:52

## 2024-01-30 RX ADMIN — INSULIN GLARGINE 35 UNITS: 100 INJECTION, SOLUTION SUBCUTANEOUS at 20:52

## 2024-01-30 RX ADMIN — HEPARIN SODIUM 7500 UNITS: 5000 INJECTION INTRAVENOUS; SUBCUTANEOUS at 13:41

## 2024-01-30 RX ADMIN — GABAPENTIN 300 MG: 300 CAPSULE ORAL at 09:11

## 2024-01-30 RX ADMIN — CEFTRIAXONE SODIUM 2 G: 2 INJECTION, SOLUTION INTRAVENOUS at 09:11

## 2024-01-30 RX ADMIN — CLONIDINE HYDROCHLORIDE 0.1 MG: 0.1 TABLET ORAL at 09:11

## 2024-01-30 RX ADMIN — GABAPENTIN 300 MG: 300 CAPSULE ORAL at 20:53

## 2024-01-30 RX ADMIN — HEPARIN SODIUM 7500 UNITS: 5000 INJECTION INTRAVENOUS; SUBCUTANEOUS at 22:18

## 2024-01-30 RX ADMIN — AMLODIPINE BESYLATE 10 MG: 10 TABLET ORAL at 09:10

## 2024-01-30 RX ADMIN — ATORVASTATIN CALCIUM 40 MG: 40 TABLET, FILM COATED ORAL at 09:11

## 2024-01-30 ASSESSMENT — COGNITIVE AND FUNCTIONAL STATUS - GENERAL
DAILY ACTIVITIY SCORE: 14
EATING MEALS: A LOT
DRESSING REGULAR UPPER BODY CLOTHING: A LOT
TURNING FROM BACK TO SIDE WHILE IN FLAT BAD: A LITTLE
DAILY ACTIVITIY SCORE: 12
WALKING IN HOSPITAL ROOM: A LITTLE
CLIMB 3 TO 5 STEPS WITH RAILING: A LITTLE
MOVING TO AND FROM BED TO CHAIR: A LITTLE
STANDING UP FROM CHAIR USING ARMS: A LITTLE
DRESSING REGULAR LOWER BODY CLOTHING: A LOT
STANDING UP FROM CHAIR USING ARMS: A LITTLE
EATING MEALS: A LOT
DRESSING REGULAR UPPER BODY CLOTHING: A LOT
TOILETING: A LITTLE
TOILETING: A LOT
CLIMB 3 TO 5 STEPS WITH RAILING: A LITTLE
HELP NEEDED FOR BATHING: A LOT
TURNING FROM BACK TO SIDE WHILE IN FLAT BAD: A LITTLE
PERSONAL GROOMING: A LITTLE
MOBILITY SCORE: 19
WALKING IN HOSPITAL ROOM: A LITTLE
DRESSING REGULAR LOWER BODY CLOTHING: A LOT
MOVING TO AND FROM BED TO CHAIR: A LITTLE
MOBILITY SCORE: 19
HELP NEEDED FOR BATHING: A LOT
PERSONAL GROOMING: A LOT

## 2024-01-30 ASSESSMENT — PAIN SCALES - GENERAL
PAINLEVEL_OUTOF10: 0 - NO PAIN
PAINLEVEL_OUTOF10: 0 - NO PAIN

## 2024-01-30 ASSESSMENT — PAIN SCALES - WONG BAKER: WONGBAKER_NUMERICALRESPONSE: NO HURT

## 2024-01-30 NOTE — PROGRESS NOTES
TCC spoke to pt about care team reccs for LOW intensity and pt agreeable. AOC is [facility] PT/OT/HHA. TCC discussed PT reccs for wheeled walker. Pt states she does have a standard walker but did not get it through her insurance so would be agreeable to getting a wheeled walker-no DME company preference. TCC also made pt aware if home O2 is needed TCC would order O2 for her and pt agreeable. Will update medical team and will continue to follow.   1551-Pt will need O2 based of walking pulse ox test. TCC spoke to rep at [facility] ([phone]) that stated clinicals can be faxed to them at [phone]. TCC will fax clinicals and made medical team aware will need MD O2 script in order to get home O2 approved for pt.

## 2024-01-30 NOTE — CARE PLAN
The patient's goals for the shift include      The clinical goals for the shift include Pt will have no c/o of beatrice or rat epain <4 this shift

## 2024-01-30 NOTE — NURSING NOTE
01/30/2024    Walking pulse ox.    Patient Pulse -ox at rest was 90-91%  on Room-air.      Patient ambulated in room out to the burrows. Pulse-ox  On Room air Decreased to 84-85%   Patient placed back on her 4L O2 NC pulse increased to 95-95%

## 2024-01-30 NOTE — CARE PLAN
The patient's goals for the shift include      The clinical goals for the shift include patient will have no c/o of SOB during this shift

## 2024-01-30 NOTE — PROGRESS NOTES
"Nuris Squires is a 58 y.o. female on day 5 of admission presenting with Shortness of breath.    Subjective   No acute events overnight. Patient did not have her thoracentesis yesterday due to not enough fluid seen on ultrasound, so procedure was aborted. Patient feels improved this morning, but not back to her baseline. She reports good urine output yesterday without diuretics. Patient and her  do not feel that the patient is ready to go home at this point, but that she is significantly improved. Plan to have her walk with PT today and walking pulse ox.   Patient denies any fever, chills, lightheadedness, abdominal pain, nausea, vomiting         Objective   Weight: 102 kg (225 lb) (01/24/24 2024)    Daily Weight  01/30/24 : 106 kg (234 lb 8 oz)    Last Recorded Vitals  Heart Rate:  [66-85]   Temp:  [35.6 °C (96 °F)-36.5 °C (97.7 °F)]   Resp:  [17-18]   BP: (113-161)/(61-73)   Height:  [157.5 cm (5' 2.01\")]   Weight:  [106 kg (234 lb 8 oz)-108 kg (238 lb 1.6 oz)]   SpO2:  [96 %-97 %]        Intake/Output last 3 Shifts:  I/O last 3 completed shifts:  In: 696 (6.5 mL/kg) [P.O.:646; IV Piggyback:50]  Out: 300 (2.8 mL/kg) [Urine:300 (0.1 mL/kg/hr)]  Weight: 106.4 kg       Relevant Results  Results from last 7 days   Lab Units 01/30/24  0854 01/29/24  1219 01/28/24  0631   WBC AUTO x10*3/uL 5.8 5.5 5.8   HEMOGLOBIN g/dL 8.6* 8.6* 8.6*   HEMATOCRIT % 27.0* 26.3* 27.5*   PLATELETS AUTO x10*3/uL 416 419 434     Results from last 7 days   Lab Units 01/30/24  0854 01/29/24  1218 01/28/24  0631   SODIUM mmol/L 142 141 141   POTASSIUM mmol/L 4.8 4.2 4.5   CO2 mmol/L 27 28 26   ANION GAP mmol/L 14 13 15   BUN mg/dL 33* 33* 30*   CREATININE mg/dL 1.78* 1.93* 1.93*   GLUCOSE mg/dL 75 178* 116*   EGFR mL/min/1.73m*2 33* 30* 30*   MAGNESIUM mg/dL 2.08 2.09 2.00   PHOSPHORUS mg/dL 5.1* 5.1* 5.3*      Results from last 7 days   Lab Units 01/26/24  0358 01/24/24 2032 01/23/24  1230   ALT U/L 19 25 16   AST U/L 15 28 17   ALK " "PHOS U/L 93 135* 67      Results from last 7 days   Lab Units 01/29/24  1217 01/24/24 2032   INR  1.1 1.2*         Results from last 7 days   Lab Units 01/24/24 2032   POCT PH, VENOUS pH 7.38   POCT PCO2, VENOUS mm Hg 41           No lab exists for component: \"BNPRESU\", \"CPKT\"          Physical Exam  General: Resting comfortably in bed. Well developed, well nourished. Appears stated age. In no acute distress   HEENT: Normocephalic and atraumatic. EOMI, sclera non-icteric, MMM, no JVD.  Cardiovascular: RRR, no r/m/g  Pulmonary: Lungs sounds diminished in bilateral bases, improved over previous exam. No wheezes/rhonchi/rales appreciated. On 4 L NC. No increased WOB or dyspnea to conversation.   GI: +BS, soft, non-tender, non-distended  : No suprapubic/ flank pain  Extremities: Dressing in place over right foot.   Skin: warm and dry. No rashes or lesions   Neurologic: CN II-XII grossly intact. No focal neurologic deficit   Psych: Pleasant. Appropriate mood and affect     Medications    amLODIPine, 10 mg, oral, Daily  aspirin, 81 mg, oral, Daily  atorvastatin, 40 mg, oral, Daily  cefTRIAXone, 2 g, intravenous, q24h  cloNIDine, 0.1 mg, oral, BID  gabapentin, 300 mg, oral, BID  heparin (porcine), 7,500 Units, subcutaneous, q8h NEHEMIAS  insulin glargine, 35 Units, subcutaneous, Nightly  insulin lispro, 0-10 Units, subcutaneous, TID with meals  polyethylene glycol, 17 g, oral, Daily  sennosides-docusate sodium, 1 tablet, oral, BID             PRN medications: dextrose 10 % in water (D10W), dextrose 10 % in water (D10W), dextrose, dextrose, glucagon, glucagon, oxygen            Assessment/Plan   Principal Problem:    Shortness of breath    Nuris Squires is a 58 y.o. female with pmhx of HTN, CKD 3, DM2 and recent right great toe amputation (1/12/23) who presented to the ED with shortness of breath found to have SBPs into the 200s and radiographic (B/l pleural effusions) and laboratory evidence (BNP>300 supportive of fluid " overloaded state. Patient admitted to MICU for respiratory support on airvo and underwent IV diuresis with resultant reduction of oxygen requirement to 6L via NC. She is now transferred to [facility] for further management.     Initial presentation of hypoxia and elevated BNP I/s/o hypertension noted to the SBPs in the 220s with improvement with IV diuresis is c/w flash pulmonary edema 2/2 HTN emergency. However, she c/w bilateral pleural effusions of unclear etiology. Given her normal echocardiogram (LVEF hyperdynamic with no evidence of diastolic dysfunction and RVSP wnl) less likely that her effusions as 2/2 ADHF. Her infectious workup has been unrevealing to date and she without clear signs of infectious etiology as she is afebrile and without leukocytosis. It is clear that her AHRF is 2/2 these effusions and she would benefit from thoracentesis both therapeutically and diagnostically. Would favor IR guided thoracentesis if unable to complete at bedside. Will plan for diuretic holiday today I/s/o VENKAT on CKD with low c/f CRS (euvolemic on exam). Can consider spot doses as needed to wean O2 requirements.     Updates 1/30:  - Did not have thoracentesis due to not enough fluid to safely drain  - Continue to hold diuretics; creatinine down trending now, expect autodiuresis  - Repeat procalcitonin 0.05 (normal); likely previously elevated in setting of decreased kidney function   - Repeat CXR (PA and lateral) later today to evaluate effusion size   - Per chart review, patient w/ chart dx of sickle cell disease; no history of acute pain syndrome, but will add on hemoglobin electrophoresis to evaluate   - PT evaluation: safe for home discharge; spO2 >91% on 4L NC; Home with home care PT 3x/week    #AHRF, improving  #Bilateral pleural effusion of unclear etiology    #c/f flash pulmonary edema vs ADHF I/s/o hypertensive emergency   #c/f atypical PNA  :: CXR on admission with Low lung volumes with resultant bronchovascular  crowding. Hazy left basilar opacity with blunting of the left costophrenic angle which may represent atelectasis or infiltrate. No evidence of sizeable pleural effusion.  ::CTPE (01/25)- no evidence of acute PE, but with  Cardiomegaly and large bilateral pleural effusion and bilateral atelectatic/consolidative opacities and also groundglass opacities  and septal thickening compatible with edema. Also with asymmetric left upper lobe consolidative opacity concerning for superimposed pneumonia.  :: Procal 0.14, ESR 59, CRP 0.21; repeat procalcitonin 1/29 negative   :: Flu, COVID- negative, RSV pending, MRSA negative    :: Strep pneumo, legionella negative   ::Bcx with NGTD  :: , troponin wnl   :: Per MICU note bedside POCUS with no visible area for thoracentesis   :: s/p IV lasix 40 mg x 3  :: Echocardiogram (01/25)- LVEF 70-75% with no RWMA, LV septal wall thickness mildly increased with concentric LVH; however, with a normal pattern of LV diastolic filling. Also with normal RV global systolic function, RSVP wnl.     Plan:  - wean O2 as tolerated, goal O2 sat >92%  - thoracentesis (added on to bronch suite schedule 1/29) to evaluate etiology of bilateral pleural effusion  - continue to hold diuresis as patient's creatinine improving with good urine output; expect autodiuresis   - C/w Azithromycin (end 01/28) and Ceftriaxone (Plan for 6 weeks treatment, 02/21/24- as per below)   - Repeat CXR (PA and lateral) later today 1/30 to evaluate for effusion   - Encouraged use of incentive spirometer and for patient to get out of bed to help resolve any underlying atelectasis      #HTN, Uncontrolled I/s/o medication noncompliance   # Hypertensive emergency (AHDF vs flash pulmonary edema?), resolved   ::Home regimen: Lisinopril 40 mg every day, Norvasc 10 mg every day, Clonidine 0.1 mg every day  :: Echocardiogram as above     Plan:  - holding home lisinopril due to recent VENKAT; plan to restart after creatinine improving  multiple days   - cont Amlodipine 10 mg every day and Clonidine 0.1 mg BID (home regimen) for BP control   - Would favor optimization of her home going BP regimen with long acting agents      #DLD  ::Last lipid panel 05/2023- cholesterol 174, HDL 38.5, LDL 98,   :: NM stress test 10/2019 Normal myocardial perfusion study without evidence of ischemia or  prior infarction.  The 10-year ASCVD risk score (Sunshine GÓMEZ, et al., 2019) is: 13.5%    Values used to calculate the score:      Age: 58 years      Sex: Female      Is Non- : Yes      Diabetic: Yes      Tobacco smoker: No      Systolic Blood Pressure: 120 mmHg      Is BP treated: Yes      HDL Cholesterol: 38.5 mg/dL      Total Cholesterol: 174 mg/dL     Plan:  - c/w Lipitor 40 mg every day, aspirin 81 mg every day     #Right Great toe amputation 01/12/2023, with Group B, Candida, MSSA infection   ::Cultures Previous cx:  01/09 Urine cx Group B  01/10 Foot ulcer MSSA, Sadaf G.   01/12 R first digit cx Group B strep    Plan:  - Cont Ceftriaxone 01/25- STOP 02/21/24      #Stage 2 non-oliguric VENKAT on CKD III  :: Baseline creatinine 0.8   :: Peaked at 5.3 creatinine during last admission (01/14)  :: Evaluated by Nephrology last hospitalization who felt Likely acute tubular necrosis multifactorial in the setting of hypotension, infection, IV contrast, Vancomycin and Zosyn with no indications for HD   :: 1.42 on admission with initial improvement to 1.3 following IV diuresis, but then uptitrated    Plan:  - Diuretic holiday, intravascular volume depletion likely contributing   - Urine studies w/ FeUrea 35.4% consistent w/ intrinsic disease; creatinine now improving to 1.78 from 1.93  - Will continue to hold diuretics      #TIIDM, uncontrolled   #Neuropathy   :: A1C 13.1 on admission   ::home regimen: Lantus 70, Humalog 10 u TID and metformin 500 mg BID  Impression: Suspect medication noncompliance given current A1C and history of noncompliance  with antihypertensive meds   Plan:  - Lantus 35 Units at bedtime, SSI#2, titrate to goal BS of 140-180  - Cont home gabapentin 300 mg BID      #Anemia of Chronic disease   #Chart diagnosis of sickle cell disease  :: Baseline Hgb 11-12   ::Iron studies (01/2024)- Iron 59, TIBC 172, %saturation 34  :: Ferritin 276 (01/2024)    Plan:  - CTM with daily CBC   - Hb electrophoresis; low concern for hemolysis at this time      #Insomnia  -cont melatonin for nsomnia      F PRN  E PRN, K>4, Mg>2  N Carb control diet  A: Serena catheter      GI Ppx: no indicated  DVT Ppx: subcutaneous heparin      CODE status: FULL CODE   NOK: Sister (Mone): [phone]     Ming Smith MD  Internal Medicine PGY-1

## 2024-01-31 ENCOUNTER — PHARMACY VISIT (OUTPATIENT)
Dept: PHARMACY | Facility: CLINIC | Age: 59
End: 2024-01-31
Payer: MEDICARE

## 2024-01-31 VITALS
WEIGHT: 234.8 LBS | SYSTOLIC BLOOD PRESSURE: 157 MMHG | OXYGEN SATURATION: 98 % | HEIGHT: 62 IN | HEART RATE: 64 BPM | DIASTOLIC BLOOD PRESSURE: 72 MMHG | TEMPERATURE: 97.5 F | RESPIRATION RATE: 18 BRPM | BODY MASS INDEX: 43.21 KG/M2

## 2024-01-31 LAB
ALBUMIN SERPL BCP-MCNC: 3.1 G/DL (ref 3.4–5)
ANION GAP SERPL CALC-SCNC: 15 MMOL/L (ref 10–20)
BASOPHILS # BLD AUTO: 0.04 X10*3/UL (ref 0–0.1)
BASOPHILS NFR BLD AUTO: 0.8 %
BUN SERPL-MCNC: 32 MG/DL (ref 6–23)
CALCIUM SERPL-MCNC: 9.1 MG/DL (ref 8.6–10.6)
CHLORIDE SERPL-SCNC: 108 MMOL/L (ref 98–107)
CO2 SERPL-SCNC: 25 MMOL/L (ref 21–32)
CREAT SERPL-MCNC: 1.55 MG/DL (ref 0.5–1.05)
EGFRCR SERPLBLD CKD-EPI 2021: 38 ML/MIN/1.73M*2
EOSINOPHIL # BLD AUTO: 0.26 X10*3/UL (ref 0–0.7)
EOSINOPHIL NFR BLD AUTO: 5.1 %
ERYTHROCYTE [DISTWIDTH] IN BLOOD BY AUTOMATED COUNT: 13.5 % (ref 11.5–14.5)
GLUCOSE BLD MANUAL STRIP-MCNC: 179 MG/DL (ref 74–99)
GLUCOSE BLD MANUAL STRIP-MCNC: 90 MG/DL (ref 74–99)
GLUCOSE SERPL-MCNC: 73 MG/DL (ref 74–99)
HCT VFR BLD AUTO: 29.8 % (ref 36–46)
HEMOGLOBIN A2: 2.5 % (ref 2–3.5)
HEMOGLOBIN A: 97.1 % (ref 95.8–98)
HEMOGLOBIN F: 0.4 % (ref 0–2)
HEMOGLOBIN IDENTIFICATION INTERPRETATION: NORMAL
HGB BLD-MCNC: 8.7 G/DL (ref 12–16)
IMM GRANULOCYTES # BLD AUTO: 0.01 X10*3/UL (ref 0–0.7)
IMM GRANULOCYTES NFR BLD AUTO: 0.2 % (ref 0–0.9)
LYMPHOCYTES # BLD AUTO: 2.09 X10*3/UL (ref 1.2–4.8)
LYMPHOCYTES NFR BLD AUTO: 41.1 %
MAGNESIUM SERPL-MCNC: 2.09 MG/DL (ref 1.6–2.4)
MCH RBC QN AUTO: 29 PG (ref 26–34)
MCHC RBC AUTO-ENTMCNC: 29.2 G/DL (ref 32–36)
MCV RBC AUTO: 99 FL (ref 80–100)
MONOCYTES # BLD AUTO: 0.51 X10*3/UL (ref 0.1–1)
MONOCYTES NFR BLD AUTO: 10 %
NEUTROPHILS # BLD AUTO: 2.18 X10*3/UL (ref 1.2–7.7)
NEUTROPHILS NFR BLD AUTO: 42.8 %
NRBC BLD-RTO: 0 /100 WBCS (ref 0–0)
PATH REVIEW-HGB IDENTIFICATION: NORMAL
PHOSPHATE SERPL-MCNC: 4.2 MG/DL (ref 2.5–4.9)
PLATELET # BLD AUTO: 442 X10*3/UL (ref 150–450)
POTASSIUM SERPL-SCNC: 4.6 MMOL/L (ref 3.5–5.3)
RBC # BLD AUTO: 3 X10*6/UL (ref 4–5.2)
SODIUM SERPL-SCNC: 143 MMOL/L (ref 136–145)
WBC # BLD AUTO: 5.1 X10*3/UL (ref 4.4–11.3)

## 2024-01-31 PROCEDURE — RXMED WILLOW AMBULATORY MEDICATION CHARGE

## 2024-01-31 PROCEDURE — 2500000001 HC RX 250 WO HCPCS SELF ADMINISTERED DRUGS (ALT 637 FOR MEDICARE OP)

## 2024-01-31 PROCEDURE — 2500000004 HC RX 250 GENERAL PHARMACY W/ HCPCS (ALT 636 FOR OP/ED)

## 2024-01-31 PROCEDURE — 85025 COMPLETE CBC W/AUTO DIFF WBC: CPT

## 2024-01-31 PROCEDURE — 80069 RENAL FUNCTION PANEL: CPT

## 2024-01-31 PROCEDURE — 82947 ASSAY GLUCOSE BLOOD QUANT: CPT

## 2024-01-31 PROCEDURE — 99238 HOSP IP/OBS DSCHRG MGMT 30/<: CPT

## 2024-01-31 PROCEDURE — 83735 ASSAY OF MAGNESIUM: CPT

## 2024-01-31 RX ORDER — CLONIDINE HYDROCHLORIDE 0.1 MG/1
0.1 TABLET ORAL 2 TIMES DAILY
Qty: 60 TABLET | Refills: 0 | Status: SHIPPED | OUTPATIENT
Start: 2024-01-31 | End: 2024-02-26 | Stop reason: SDUPTHER

## 2024-01-31 RX ORDER — CEFTRIAXONE 2 G/1
2 INJECTION, POWDER, FOR SOLUTION INTRAMUSCULAR; INTRAVENOUS DAILY
Qty: 42 G | Refills: 0 | Status: SHIPPED
Start: 2024-01-31 | End: 2024-02-21

## 2024-01-31 RX ORDER — INSULIN LISPRO 100 [IU]/ML
INJECTION, SOLUTION INTRAVENOUS; SUBCUTANEOUS
Qty: 10 ML | Refills: 1 | Status: SHIPPED | OUTPATIENT
Start: 2024-01-31 | End: 2024-02-10 | Stop reason: HOSPADM

## 2024-01-31 RX ORDER — INSULIN GLARGINE 100 [IU]/ML
INJECTION, SOLUTION SUBCUTANEOUS
Qty: 20 ML | Refills: 2 | Status: SHIPPED | OUTPATIENT
Start: 2024-01-31 | End: 2024-02-10 | Stop reason: HOSPADM

## 2024-01-31 RX ORDER — ACETAMINOPHEN 325 MG/1
975 TABLET ORAL EVERY 8 HOURS PRN
Status: DISCONTINUED | OUTPATIENT
Start: 2024-01-31 | End: 2024-01-31 | Stop reason: HOSPADM

## 2024-01-31 RX ADMIN — ACETAMINOPHEN 975 MG: 325 TABLET ORAL at 09:02

## 2024-01-31 RX ADMIN — GABAPENTIN 300 MG: 300 CAPSULE ORAL at 08:27

## 2024-01-31 RX ADMIN — CLONIDINE HYDROCHLORIDE 0.1 MG: 0.1 TABLET ORAL at 08:27

## 2024-01-31 RX ADMIN — HEPARIN SODIUM 7500 UNITS: 5000 INJECTION INTRAVENOUS; SUBCUTANEOUS at 06:20

## 2024-01-31 RX ADMIN — ASPIRIN 81 MG CHEWABLE TABLET 81 MG: 81 TABLET CHEWABLE at 06:20

## 2024-01-31 RX ADMIN — ATORVASTATIN CALCIUM 40 MG: 40 TABLET, FILM COATED ORAL at 08:30

## 2024-01-31 RX ADMIN — CEFTRIAXONE SODIUM 2 G: 2 INJECTION, SOLUTION INTRAVENOUS at 08:26

## 2024-01-31 RX ADMIN — AMLODIPINE BESYLATE 10 MG: 10 TABLET ORAL at 08:27

## 2024-01-31 ASSESSMENT — PAIN SCALES - GENERAL: PAINLEVEL_OUTOF10: 6

## 2024-01-31 ASSESSMENT — COGNITIVE AND FUNCTIONAL STATUS - GENERAL
CLIMB 3 TO 5 STEPS WITH RAILING: A LITTLE
EATING MEALS: A LITTLE
TOILETING: A LITTLE
DAILY ACTIVITIY SCORE: 19
DRESSING REGULAR LOWER BODY CLOTHING: A LITTLE
HELP NEEDED FOR BATHING: A LITTLE
MOVING TO AND FROM BED TO CHAIR: A LITTLE
DRESSING REGULAR UPPER BODY CLOTHING: A LITTLE
TURNING FROM BACK TO SIDE WHILE IN FLAT BAD: A LITTLE
STANDING UP FROM CHAIR USING ARMS: A LITTLE
MOBILITY SCORE: 19
WALKING IN HOSPITAL ROOM: A LITTLE

## 2024-01-31 ASSESSMENT — PAIN DESCRIPTION - LOCATION: LOCATION: HEAD

## 2024-01-31 NOTE — DISCHARGE SUMMARY
Discharge Diagnosis  Shortness of breath    Issues Requiring Follow-Up  - Urine cultures from 1/29 growing E. Faecium; patient without symptoms, did not treat for asymptomatic bacteruria   - Follow up with PCP in 1 week; needs repeat labs to ensure creatinine down trending; 1.55 on day of discharge  - If creatinine stable, consider restarting lisinopril for HTN and DM  - Follow up with pulm in 2 weeks, and then in 6 weeks for PFTs and exercise walk test  - Follow up with podiatry within 1 week with Dr. Tonya Pizano (did her surgery on 1/12)  - Continue ceftriaxone until 2/21     Test Results Pending At Discharge  Pending Labs       Order Current Status    Hemoglobin Identification with Path Review In process    Urine Culture Preliminary result            Hospital Course  Nuris Squires is a 58 y.o. female w/ PMHx HTN, CKD stage 3, DM2 on insulin who was admitted for acute hypoxic respiratory failure secondary to hypertensive emergency, fluid overload, and flash pulmonary edema. Suspect that this was due to missing anti-hypertensive medications. She was originally admitted to the medical ICU requiring Airvo for respiratory support. She was found to have large bilateral pleural effusions concerning for cardiac origin. Echo showed hyperdynamic EF of 70-75% without signs of heart failure. She had decreasing O2 requirements after being diuresed with lasix 40mg IV, and patient was transferred to the medical floor. There was some concern for atypical pneumonia, patient was started on azithromycin for 3 days. Patient had a recent admission for R great toe amputation 2/2 infection, patient was already on home ceftriaxone, that was continued.   Once transferred to medical floor, patient was diuresed for 2 additional days, but diuretics held when creatinine increased to 1.9, then down trended to 1.7 after diuretic holiday. Patient had good urine output with no diuretics. Plan was for thoracentesis, but when patient went  for procedure, there was no pocket big enough to drain. Patient was stable on 4L NC with exertion, was prescribed home O2. Plan to discharge with close follow up with podiatry and pulmonology. Expect pleural effusions to resolve completely with increased activity, better BP control, and lasix as needed.     Pertinent Physical Exam At Time of Discharge  Physical Exam  Constitutional:       General: She is not in acute distress.     Appearance: Normal appearance. She is obese. She is not toxic-appearing.   HENT:      Head: Normocephalic and atraumatic.      Nose: Nose normal.      Mouth/Throat:      Mouth: Mucous membranes are moist.      Pharynx: Oropharynx is clear. No oropharyngeal exudate.   Eyes:      General: No scleral icterus.     Extraocular Movements: Extraocular movements intact.      Conjunctiva/sclera: Conjunctivae normal.      Pupils: Pupils are equal, round, and reactive to light.   Cardiovascular:      Rate and Rhythm: Normal rate and regular rhythm.      Pulses: Normal pulses.      Heart sounds: No murmur heard.  Pulmonary:      Effort: Pulmonary effort is normal. No respiratory distress.      Breath sounds: No wheezing.      Comments: Diminished breath sounds at bilateral bases  Abdominal:      General: Abdomen is flat. Bowel sounds are normal. There is no distension.      Tenderness: There is no abdominal tenderness. There is no guarding.   Musculoskeletal:         General: Normal range of motion.      Cervical back: Normal range of motion and neck supple.      Comments: R foot wrapped in kerlix    Skin:     General: Skin is warm and dry.      Capillary Refill: Capillary refill takes less than 2 seconds.   Neurological:      General: No focal deficit present.      Mental Status: She is alert and oriented to person, place, and time.   Psychiatric:         Mood and Affect: Mood normal.         Home Medications     Medication List      START taking these medications     alteplase 2 mg injection;  Commonly known as: Cathflo Activase; 2 mL (2   mg) by intra-catheter route 1 time for 1 dose.   cefTRIAXone 2 gram; Commonly known as: Rocephin; Infuse 2 g into a   venous catheter once daily for 21 days.   oxygen gas therapy; Commonly known as: O2; Inhale 1 each once every 24   hours.     CHANGE how you take these medications     cloNIDine 0.1 mg tablet; Commonly known as: Catapres; Take 1 tablet (0.1   mg) by mouth 2 times a day.; What changed: when to take this   * insulin lispro 100 unit/mL injection; Commonly known as: HumaLOG U-100   Insulin; Inject 10 units (0.1mL) with meals 3 times per day; What changed:   You were already taking a medication with the same name, and this   prescription was added. Make sure you understand how and when to take   each.   * insulin lispro 100 unit/mL injection; Commonly known as: HumaLOG; What   changed: Another medication with the same name was added. Make sure you   understand how and when to take each.   * Lantus U-100 Insulin 100 unit/mL injection; Generic drug: insulin   glargine; What changed: Another medication with the same name was added.   Make sure you understand how and when to take each.   * insulin glargine 100 unit/mL injection; Commonly known as: Lantus;   Inject 70 units nightly.; What changed: You were already taking a   medication with the same name, and this prescription was added. Make sure   you understand how and when to take each.  * This list has 4 medication(s) that are the same as other medications   prescribed for you. Read the directions carefully, and ask your doctor or   other care provider to review them with you.     CONTINUE taking these medications     acetaminophen 325 mg tablet; Commonly known as: Tylenol; Take 2 tablets   (650 mg) by mouth every 4 hours if needed for mild pain (1 - 3).   amLODIPine 10 mg tablet; Commonly known as: Norvasc; Take 1 tablet (10   mg) by mouth once daily.   aspirin 81 mg chewable tablet   atorvastatin 40 mg  tablet; Commonly known as: Lipitor; Take 1 tablet (40   mg) by mouth once daily.   cefTRIAXone 2 gram/50 mL IV; Commonly known as: Rocephin; Infuse 50 mL   (2 g) at 100 mL/hr over 30 minutes into a venous catheter once every 24   hours for 37 doses.   cholecalciferol 50 mcg (2,000 unit) capsule; Commonly known as: Vitamin   D-3   magnesium oxide 400 mg (241.3 mg magnesium) tablet; Commonly known as:   Mag-Ox; Take 1 tablet (400 mg) by mouth once daily. Do not start before   January 19, 2024.   meclizine 25 mg tablet,chewable; Commonly known as: Antivert; Chew 1   tablet (25 mg) 3 times a day as needed (vertigo).   metFORMIN 500 mg tablet; Commonly known as: Glucophage   OneTouch Delica Plus Lancet 30 gauge misc; Generic drug: lancets   OneTouch Ultra2 Meter misc; Generic drug: blood-glucose meter   OneTouch Verio test strips strip; Generic drug: blood sugar diagnostic     STOP taking these medications     dicyclomine 10 mg capsule; Commonly known as: Bentyl   lisinopril 40 mg tablet       Outpatient Follow-Up  Future Appointments   Date Time Provider Department [place]   2/1/2024 10:00 AM Amanda Joe, Alf [id number] [facility]   2/1/2024  3:00 PM Woundcare Provider [id number] [facility]   2/6/2024  9:30 AM Mone Aquino MD [id number] [facility]   3/20/2024 10:15 AM Betty Bartlett OD [id number] [facility]       Ming Smith MD

## 2024-01-31 NOTE — CARE PLAN
The patient's goals for the shift include      The clinical goals for the shift include Pt willhave no c/o of pain or rate pain < 4 this shift

## 2024-01-31 NOTE — PROGRESS NOTES
TCC spoke to Rubio at [facility] to check status of O2 order. Per Rubio clinicals received and will have Veronica from [facility] call TCC to make aware of when O2 tank will be delivered to .   1147-TCC spoke to Veronica that stated she is bringing pt's O2 tank to her bedside now. Will update medical team and pt's RN.   1511-Still awaiting [facility] to confirm CHRIS since pt will need to continue IV abx until 2/21. TCC delivered wheeled walker to pt's bedside from [place]. Will continue to follow.   1537-Per [facility] CHRIS with therapy on 2/2 and RN will see pt on 2/3. Pt already active with [facility] for IV abx and has IV abx per  pharmacist so per [facility] pt ok to discharge home today. Medical team and pt's RN aware.

## 2024-01-31 NOTE — CARE PLAN
The patient's goals for the shift include      The clinical goals for the shift include patient will have no pain during this shift

## 2024-01-31 NOTE — HOSPITAL COURSE
Nuris Squires is a 58 y.o. female w/ PMHx HTN, CKD stage 3, DM2 on insulin who was admitted for acute hypoxic respiratory failure secondary to hypertensive emergency, fluid overload, and flash pulmonary edema. Suspect that this was due to missing anti-hypertensive medications. She was originally admitted to the medical ICU requiring Airvo for respiratory support. She was found to have large bilateral pleural effusions concerning for cardiac origin. Echo showed hyperdynamic EF of 70-75% without signs of heart failure. She had decreasing O2 requirements after being diuresed with lasix 40mg IV, and patient was transferred to the medical floor. There was some concern for atypical pneumonia, patient was started on azithromycin for 3 days. Patient had a recent admission for R great toe amputation 2/2 infection, patient was already on home ceftriaxone, that was continued.   Once transferred to medical floor, patient was diuresed for 2 additional days, but diuretics held when creatinine increased to 1.9, then down trended to 1.7 after diuretic holiday. Patient had good urine output with no diuretics. Plan was for thoracentesis, but when patient went for procedure, there was no pocket big enough to drain. Patient was stable on 4L NC with exertion, was prescribed home O2. Plan to discharge with close follow up with podiatry and pulmonology. Expect pleural effusions to resolve completely with increased activity, better BP control, and lasix as needed.

## 2024-01-31 NOTE — SIGNIFICANT EVENT
Patient s/p hallux amputation with Dr. Pizano earlier this month. Incision looks clean and well coapted without SOI. Was admitted for fluid overload and was not related to surgical complication. Was asked to provide post discharge dressing orders and follow up. Betadine wet to dry dressing orders and follow up made to surgeon who did amputation within  a week of discharge.

## 2024-01-31 NOTE — DISCHARGE INSTRUCTIONS
[phone].   It was a pleasure meeting you. Please let me know if you have any questions. LORENA Da Silva [facility] ([phone])    Nuris,     You were admitted to [facility] after your oxygen was low and there was fluid in and around your lungs. We think this was because your blood pressure was high from missing medication doses, and from your previous kidney injury that had yet to recover. You were treated with lasix (fluid pill) and oxygen. Your oxygen requirements got much better, but you still will need oxygen when you go home. This has been arranged. Your kidney function is improving on discharge. If you start to notice that you are gaining weight or that your legs are swelling, or that you are having a harder time breathing, you should see your doctor. We did not change your antibiotics, and you should continue the antibiotics with the stop date being 2/21/2024. We will have you follow up with the pulmonologist (lung doctor) 2 weeks after you are discharged, and again 6 weeks after you are discharged for breathing tests.   Please follow up with podiatry when they instructed you to do so. You should also see your primary care doctor, Dr. Aquino, after discharge to make sure your kidney function continues to get better and to check on your blood pressure.     Here is how to take your medications:   - amlodipine 10mg daily   - Clonidine 0.1mg twice daily   - Insulin humalog 10 units with meals, and Basaglar 70 units at night time  - Aspirin 81 mg daily   - Atorvastatin (lipitor) 40mg daily   - Gabapentin 300mg twice daily   - Ceftriaxone until 2/21/2024 as per your last admission to the hospital     Thank you for letting us take care of you,     Your Medical team at [facility]

## 2024-02-01 ENCOUNTER — OFFICE VISIT (OUTPATIENT)
Dept: WOUND CARE | Facility: HOSPITAL | Age: 59
End: 2024-02-01
Payer: COMMERCIAL

## 2024-02-01 ENCOUNTER — TELEMEDICINE (OUTPATIENT)
Dept: PHARMACY | Facility: HOSPITAL | Age: 59
End: 2024-02-01
Payer: COMMERCIAL

## 2024-02-01 DIAGNOSIS — Z79.4 TYPE 2 DIABETES MELLITUS WITH RIGHT EYE AFFECTED BY PROLIFERATIVE RETINOPATHY AND MACULAR EDEMA, WITH LONG-TERM CURRENT USE OF INSULIN (MULTI): ICD-10-CM

## 2024-02-01 DIAGNOSIS — E11.3511 TYPE 2 DIABETES MELLITUS WITH RIGHT EYE AFFECTED BY PROLIFERATIVE RETINOPATHY AND MACULAR EDEMA, WITH LONG-TERM CURRENT USE OF INSULIN (MULTI): ICD-10-CM

## 2024-02-01 LAB — BACTERIA UR CULT: ABNORMAL

## 2024-02-01 PROCEDURE — 99213 OFFICE O/P EST LOW 20 MIN: CPT | Mod: 25

## 2024-02-01 RX ORDER — BLOOD-GLUCOSE SENSOR
EACH MISCELLANEOUS
Qty: 2 EACH | Refills: 11 | Status: SHIPPED | OUTPATIENT
Start: 2024-02-01

## 2024-02-01 RX ORDER — BLOOD-GLUCOSE,RECEIVER,CONT
EACH MISCELLANEOUS
Qty: 1 EACH | Refills: 0 | Status: SHIPPED | OUTPATIENT
Start: 2024-02-01

## 2024-02-01 NOTE — ASSESSMENT & PLAN NOTE
Diabetes: Uncontrolled with last A1c 13.1% on 1/24/24. Current regimen includes Metformin 500mg twice daily, Lantus 70 units daily at bedtime, and Humalog 10 units TID with meals. Jardiance discontinued due to VENKAT. Patient has historically poor medication adherence, but has improved recently. Home BG readings with glucometer reported as BG 's. One episode of hypoglycemia in hospital, none at home. Patient ran out of Dexcom sensors and new insurance prefers [employer], will provide Rx. Due to A1c above goal and home BG readings widely variable, recommend starting GLP-1/GIP to improve glycemic control without hypoglycemia. Patient may be eligible for  PAP to assist with cost. Patient declines any medication changes at this time and would like to revisit discussion in 1 month.   Continue taking Metformin 500mg twice daily, Lantus 70 units daily at bedtime, and Humalog 10 units TID with meals.  Continue to monitor and record BG daily. Order for [employer] 3 sensors provided.

## 2024-02-01 NOTE — PROGRESS NOTES
[facility] Pharmacist Visit - Diabetes Management  Referring Provider: Mone Aquino MD    Subjective   Nuris Squires is a 58 y.o. female who presents for Diabetes.    HPI  PMH significant for T2DM, HTN, HLD, PAD, DFI s/p toe amputation, sickle cell.  Patient has no known history of medullary thyroid cancer, pancreatitis, or complicated UTI.  Diagnosed 2009. Known DM complications include retinopathy, chronic kidney disease, and toe amputations .  Special needs/barriers to therapy: None identified    Patient recently hospitalized for acute hypoxic respiratory failure secondary to hypertensive emergency. A week prior to this she underwent right great toe amputation due to infection. Reports she is feeling well today.    Diabetes Management  Current Medications: Metformin 500mg twice daily, Lantus 70 units daily at bedtime, Humalog 10 units TID with meals  Previous Medications: Jardiance (VENKAT)    Adherence: Takes medication as directed, admits to poor adherence prior to hospitalizations (missing doses ~3x/week), but has improved recently.  Adverse Effects: None    Risk Reducing Meds  On ACEi/ARB: No (currently held)  On SGLT2i: No   On GLP1-RA: No   On Statin: Yes     Glucose Monitoring  Glucometer/CGM Type: Onetouch Ultra 2  Was using Dexcom but ran out of sensors, new insurance covers [facility].    Previous home BG readings: (9/11/23) BG range   Current home BG readings: BG range 's    Hypoglycemia: Patient denies signs and symptoms and No BG readings < 80 mg/dL  Hyperglycemia: Denies signs and symptoms    Diabetic Eye Exam:  Following with ophthalmology  Monofilament Foot Exam:  Following with podiatry    Lifestyle  Diet: Trying to eat less fried foods. Has been cooking more at home in her [employer]-pot.  Physical Activity: Working with PT, walking around house      Secondary Prevention  ASCVD Risk:   The 10-year ASCVD risk score (Sunshine GÓMEZ, et al., 2019) is: 28.1%    Values used to calculate the score:      Age:  58 years      Sex: Female      Is Non- : Yes      Diabetic: Yes      Tobacco smoker: No      Systolic Blood Pressure: 157 mmHg      Is BP treated: Yes      HDL Cholesterol: 38.5 mg/dL      Total Cholesterol: 174 mg/dL  On Statin?: Yes, atorvastatin 40mg daily    Immunizations Needed: Annual Flu, Prevnar, Shingrix, and Tdap  Tobacco Use: non-smoker      Objective   Vitals  BP Readings from Last 2 Encounters:   01/31/24 157/72   01/23/24 140/70     BMI Readings from Last 1 Encounters:   01/31/24 42.93 kg/m²      Labs  A1C  Lab Results   Component Value Date    HGBA1C 13.1 (H) 01/24/2024    HGBA1C 12.1 (A) 09/14/2023    HGBA1C 14.0 (A) 05/08/2023     BMP  Lab Results   Component Value Date    CALCIUM 9.1 01/31/2024     01/31/2024    K 4.6 01/31/2024    CO2 25 01/31/2024     (H) 01/31/2024    BUN 32 (H) 01/31/2024    CREATININE 1.55 (H) 01/31/2024    GFRF 44 (A) 08/13/2023     LFTs  Lab Results   Component Value Date    ALT 19 01/26/2024    AST 15 01/26/2024    ALKPHOS 93 01/26/2024    BILITOT 0.2 01/26/2024     FLP  Lab Results   Component Value Date    TRIG 189 (H) 05/08/2023    CHOL 174 05/08/2023    LDLF 98 05/08/2023    HDL 38.5 (A) 05/08/2023     Urine Microalbumin  Lab Results   Component Value Date    MICROALBCREA 998.6 (H) 08/24/2022     Vitamin B12  Lab Results   Component Value Date    YSRIYXKM01 582 01/18/2024         Assessment/Plan   Problem List Items Addressed This Visit       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 Current regimen includes Metformin 500mg twice daily, Lantus 70 units daily at bedtime, and Humalog 10 units TID with meals. Jardiance discontinued due to VENKAT. Patient has historically poor medication adherence, but has improved recently. Home BG readings with glucometer reported as BG 's.  One episode of hypoglycemia in hospital, none at home. Patient ran out of Dexcom sensors and new insurance prefers [employer], will provide Rx. Due to A1c above goal and home BG readings widely variable, recommend starting GLP-1/GIP to improve glycemic control without hypoglycemia. Patient may be eligible for  PAP to assist with cost. Patient declines any medication changes at this time and would like to revisit discussion in 1 month.   Continue taking Metformin 500mg twice daily, Lantus 70 units daily at bedtime, and Humalog 10 units TID with meals.  Continue to monitor and record BG daily. Order for Remington 3 sensors provided.         Relevant Medications    blood-glucose sensor (FreeStyle [employer]ington 3 Sensor) device    FreeStyle Remington 3 [place] misc    Other Relevant Orders    Follow Up In [facility] - Pharmacy     Patient Education:  Counseled patient on relevant MOA, expectations, side effects, duration of therapy, contraindications, administration, and monitoring parameters.  All questions and concerns addressed. Contact pharmacist with any further questions or concerns prior to next appointment.  Reviewed BG goals: Fasting BG goal , Postprandial BG goal <180 mg/dL, A1C goal <7.5%    Clinical Pharmacist follow-up: 2/29/24 at 10:00am, Telehealth visit  Next PCP appointment: 2/6/24    Aureliano PelayoD  Clinical Pharmacist  02/01/24    Continue all meds under the continuation of care with the referring provider and clinical pharmacy team.  Verbal consent to manage patient's drug therapy was obtained from the patient. They were informed they may decline to participate or withdraw from participation in pharmacy services at any time.

## 2024-02-02 ENCOUNTER — APPOINTMENT (OUTPATIENT)
Dept: RADIOLOGY | Facility: HOSPITAL | Age: 59
DRG: 312 | End: 2024-02-02
Payer: COMMERCIAL

## 2024-02-02 ENCOUNTER — HOSPITAL ENCOUNTER (INPATIENT)
Facility: HOSPITAL | Age: 59
LOS: 7 days | Discharge: HOME | DRG: 312 | End: 2024-02-10
Attending: EMERGENCY MEDICINE | Admitting: INTERNAL MEDICINE
Payer: COMMERCIAL

## 2024-02-02 ENCOUNTER — HOME CARE VISIT (OUTPATIENT)
Dept: HOME HEALTH SERVICES | Facility: HOME HEALTH | Age: 59
End: 2024-02-02
Payer: COMMERCIAL

## 2024-02-02 VITALS — HEART RATE: 97 BPM

## 2024-02-02 DIAGNOSIS — R55 SYNCOPE, UNSPECIFIED SYNCOPE TYPE: Primary | ICD-10-CM

## 2024-02-02 DIAGNOSIS — R06.02 SHORTNESS OF BREATH: ICD-10-CM

## 2024-02-02 DIAGNOSIS — J18.9 PNEUMONIA OF LEFT LOWER LOBE DUE TO INFECTIOUS ORGANISM: ICD-10-CM

## 2024-02-02 DIAGNOSIS — T68.XXXA HYPOTHERMIA, INITIAL ENCOUNTER: ICD-10-CM

## 2024-02-02 DIAGNOSIS — L08.9 RIGHT FOOT INFECTION: ICD-10-CM

## 2024-02-02 LAB — GLUCOSE BLD MANUAL STRIP-MCNC: 96 MG/DL (ref 74–99)

## 2024-02-02 PROCEDURE — 99285 EMERGENCY DEPT VISIT HI MDM: CPT | Mod: 25 | Performed by: EMERGENCY MEDICINE

## 2024-02-02 PROCEDURE — 71045 X-RAY EXAM CHEST 1 VIEW: CPT | Mod: FR

## 2024-02-02 PROCEDURE — 82947 ASSAY GLUCOSE BLOOD QUANT: CPT

## 2024-02-02 PROCEDURE — 70450 CT HEAD/BRAIN W/O DYE: CPT

## 2024-02-02 PROCEDURE — 93010 ELECTROCARDIOGRAM REPORT: CPT | Performed by: EMERGENCY MEDICINE

## 2024-02-02 PROCEDURE — 72125 CT NECK SPINE W/O DYE: CPT

## 2024-02-02 PROCEDURE — G0151 HHCP-SERV OF PT,EA 15 MIN: HCPCS

## 2024-02-02 PROCEDURE — 71045 X-RAY EXAM CHEST 1 VIEW: CPT | Mod: FOREIGN READ | Performed by: RADIOLOGY

## 2024-02-02 PROCEDURE — 99285 EMERGENCY DEPT VISIT HI MDM: CPT | Performed by: EMERGENCY MEDICINE

## 2024-02-02 SDOH — HEALTH STABILITY: PHYSICAL HEALTH: EXERCISE ACTIVITIES SETS: 2

## 2024-02-02 SDOH — HEALTH STABILITY: PHYSICAL HEALTH: EXERCISE TYPE: SEATED HEP

## 2024-02-02 SDOH — HEALTH STABILITY: PHYSICAL HEALTH: EXERCISE ACTIVITY: SEATED HEP

## 2024-02-02 SDOH — HEALTH STABILITY: MENTAL HEALTH: SMOKING IN HOME: 1

## 2024-02-02 SDOH — ECONOMIC STABILITY: HOUSING INSECURITY: HOME SAFETY: DISCUSSED AND REVIEWED OXYGEN SAFETY PRECAUTIONS IN THE HOME

## 2024-02-02 SDOH — HEALTH STABILITY: PHYSICAL HEALTH: EXERCISE COMMENTS: SEATED HEP INCLUDES ANKLE PUMPS, LAQS, HIP FLEXION, AND HIP ABDUCTION

## 2024-02-02 SDOH — HEALTH STABILITY: PHYSICAL HEALTH: PHYSICAL EXERCISE: SITTING

## 2024-02-02 SDOH — HEALTH STABILITY: PHYSICAL HEALTH: PHYSICAL EXERCISE: 10

## 2024-02-02 SDOH — ECONOMIC STABILITY: HOUSING INSECURITY: EVIDENCE OF SMOKING MATERIAL: 0

## 2024-02-02 ASSESSMENT — ACTIVITIES OF DAILY LIVING (ADL)
AMBULATION ASSISTANCE: 1
ENTERING_EXITING_HOME: SUPERVISION
CURRENT_FUNCTION: SUPERVISION
AMBULATION ASSISTANCE ON FLAT SURFACES: 1
CURRENT_FUNCTION: ONE PERSON
AMBULATION ASSISTANCE: ONE PERSON
PHYSICAL TRANSFERS ASSESSED: 1
AMBULATION ASSISTANCE: STAND BY ASSIST
OASIS_M1830: 05

## 2024-02-02 ASSESSMENT — ENCOUNTER SYMPTOMS
SHORTNESS OF BREATH: T
PAIN: NO PAIN REPORTED TODAY
MUSCLE WEAKNESS: 1
DEPRESSION: 0
DYSPNEA ON EXERTION: 1
OCCASIONAL FEELINGS OF UNSTEADINESS: 1
LIMITED RANGE OF MOTION: 1
FATIGUES EASILY: 1
DENIES PAIN: 1
HYPERTENSION: 1
ARTHRALGIAS: 1
LOSS OF SENSATION IN FEET: 1

## 2024-02-03 ENCOUNTER — CLINICAL SUPPORT (OUTPATIENT)
Dept: EMERGENCY MEDICINE | Facility: HOSPITAL | Age: 59
DRG: 312 | End: 2024-02-03
Payer: COMMERCIAL

## 2024-02-03 PROBLEM — R55 SYNCOPE, UNSPECIFIED SYNCOPE TYPE: Status: ACTIVE | Noted: 2024-02-03

## 2024-02-03 LAB
ALBUMIN SERPL BCP-MCNC: 3.4 G/DL (ref 3.4–5)
ALP SERPL-CCNC: 91 U/L (ref 33–110)
ALT SERPL W P-5'-P-CCNC: 22 U/L (ref 7–45)
AMPHETAMINES UR QL SCN: NORMAL
ANION GAP BLDV CALCULATED.4IONS-SCNC: 8 MMOL/L (ref 10–25)
ANION GAP SERPL CALC-SCNC: 19 MMOL/L (ref 10–20)
APPEARANCE UR: ABNORMAL
AST SERPL W P-5'-P-CCNC: 21 U/L (ref 9–39)
ATRIAL RATE: 77 BPM
BARBITURATES UR QL SCN: NORMAL
BASE EXCESS BLDV CALC-SCNC: 4.8 MMOL/L (ref -2–3)
BASOPHILS # BLD AUTO: 0.02 X10*3/UL (ref 0–0.1)
BASOPHILS NFR BLD AUTO: 0.5 %
BENZODIAZ UR QL SCN: NORMAL
BILIRUB SERPL-MCNC: 0.4 MG/DL (ref 0–1.2)
BILIRUB UR STRIP.AUTO-MCNC: NEGATIVE MG/DL
BNP SERPL-MCNC: 185 PG/ML (ref 0–99)
BODY TEMPERATURE: 37 DEGREES CELSIUS
BUN SERPL-MCNC: 19 MG/DL (ref 6–23)
BZE UR QL SCN: NORMAL
CA-I BLDV-SCNC: 1.23 MMOL/L (ref 1.1–1.33)
CALCIUM SERPL-MCNC: 9.6 MG/DL (ref 8.6–10.6)
CANNABINOIDS UR QL SCN: NORMAL
CARDIAC TROPONIN I PNL SERPL HS: 12 NG/L (ref 0–34)
CHLORIDE BLDV-SCNC: 109 MMOL/L (ref 98–107)
CHLORIDE SERPL-SCNC: 107 MMOL/L (ref 98–107)
CK SERPL-CCNC: 74 U/L (ref 0–215)
CO2 SERPL-SCNC: 26 MMOL/L (ref 21–32)
COLOR UR: YELLOW
CREAT SERPL-MCNC: 0.89 MG/DL (ref 0.5–1.05)
CRP SERPL-MCNC: 1.86 MG/DL
EGFRCR SERPLBLD CKD-EPI 2021: 75 ML/MIN/1.73M*2
EOSINOPHIL # BLD AUTO: 0 X10*3/UL (ref 0–0.7)
EOSINOPHIL NFR BLD AUTO: 0 %
ERYTHROCYTE [DISTWIDTH] IN BLOOD BY AUTOMATED COUNT: 13.2 % (ref 11.5–14.5)
FENTANYL+NORFENTANYL UR QL SCN: NORMAL
GLUCOSE BLD MANUAL STRIP-MCNC: 101 MG/DL (ref 74–99)
GLUCOSE BLD MANUAL STRIP-MCNC: 155 MG/DL (ref 74–99)
GLUCOSE BLDV-MCNC: 105 MG/DL (ref 74–99)
GLUCOSE SERPL-MCNC: 94 MG/DL (ref 74–99)
GLUCOSE UR STRIP.AUTO-MCNC: ABNORMAL MG/DL
HCO3 BLDV-SCNC: 30.9 MMOL/L (ref 22–26)
HCT VFR BLD AUTO: 31.1 % (ref 36–46)
HCT VFR BLD EST: 38 % (ref 36–46)
HGB BLD-MCNC: 10.3 G/DL (ref 12–16)
HGB BLDV-MCNC: 12.7 G/DL (ref 12–16)
HOLD SPECIMEN: NORMAL
HOLD SPECIMEN: NORMAL
HYALINE CASTS #/AREA URNS AUTO: ABNORMAL /LPF
IMM GRANULOCYTES # BLD AUTO: 0.02 X10*3/UL (ref 0–0.7)
IMM GRANULOCYTES NFR BLD AUTO: 0.5 % (ref 0–0.9)
KETONES UR STRIP.AUTO-MCNC: NEGATIVE MG/DL
LACTATE BLDV-SCNC: 1.4 MMOL/L (ref 0.4–2)
LEUKOCYTE ESTERASE UR QL STRIP.AUTO: ABNORMAL
LYMPHOCYTES # BLD AUTO: 0.8 X10*3/UL (ref 1.2–4.8)
LYMPHOCYTES NFR BLD AUTO: 18.6 %
MCH RBC QN AUTO: 29.9 PG (ref 26–34)
MCHC RBC AUTO-ENTMCNC: 33.1 G/DL (ref 32–36)
MCV RBC AUTO: 90 FL (ref 80–100)
MONOCYTES # BLD AUTO: 0.15 X10*3/UL (ref 0.1–1)
MONOCYTES NFR BLD AUTO: 3.5 %
MRSA DNA SPEC QL NAA+PROBE: NOT DETECTED
NEUTROPHILS # BLD AUTO: 3.31 X10*3/UL (ref 1.2–7.7)
NEUTROPHILS NFR BLD AUTO: 76.9 %
NITRITE UR QL STRIP.AUTO: NEGATIVE
NRBC BLD-RTO: 0 /100 WBCS (ref 0–0)
OPIATES UR QL SCN: NORMAL
OXYCODONE+OXYMORPHONE UR QL SCN: NORMAL
OXYHGB MFR BLDV: 55.3 % (ref 45–75)
P AXIS: 39 DEGREES
P OFFSET: 215 MS
P ONSET: 155 MS
PCO2 BLDV: 51 MM HG (ref 41–51)
PCP UR QL SCN: NORMAL
PH BLDV: 7.39 PH (ref 7.33–7.43)
PH UR STRIP.AUTO: 5 [PH]
PLATELET # BLD AUTO: 482 X10*3/UL (ref 150–450)
PO2 BLDV: 39 MM HG (ref 35–45)
POTASSIUM BLDV-SCNC: 4.4 MMOL/L (ref 3.5–5.3)
POTASSIUM SERPL-SCNC: 4.6 MMOL/L (ref 3.5–5.3)
PR INTERVAL: 134 MS
PROCALCITONIN SERPL-MCNC: 0.05 NG/ML
PROT SERPL-MCNC: 7.1 G/DL (ref 6.4–8.2)
PROT UR STRIP.AUTO-MCNC: ABNORMAL MG/DL
Q ONSET: 222 MS
QRS COUNT: 12 BEATS
QRS DURATION: 78 MS
QT INTERVAL: 396 MS
QTC CALCULATION(BAZETT): 448 MS
QTC FREDERICIA: 430 MS
R AXIS: 26 DEGREES
RBC # BLD AUTO: 3.44 X10*6/UL (ref 4–5.2)
RBC # UR STRIP.AUTO: NEGATIVE /UL
RBC #/AREA URNS AUTO: ABNORMAL /HPF
SAO2 % BLDV: 56 % (ref 45–75)
SODIUM BLDV-SCNC: 143 MMOL/L (ref 136–145)
SODIUM SERPL-SCNC: 147 MMOL/L (ref 136–145)
SP GR UR STRIP.AUTO: 1.01
SQUAMOUS #/AREA URNS AUTO: ABNORMAL /HPF
T AXIS: 19 DEGREES
T OFFSET: 420 MS
TSH SERPL-ACNC: 0.46 MIU/L (ref 0.44–3.98)
UROBILINOGEN UR STRIP.AUTO-MCNC: <2 MG/DL
VENTRICULAR RATE: 77 BPM
WBC # BLD AUTO: 4.3 X10*3/UL (ref 4.4–11.3)
WBC #/AREA URNS AUTO: ABNORMAL /HPF

## 2024-02-03 PROCEDURE — 2500000001 HC RX 250 WO HCPCS SELF ADMINISTERED DRUGS (ALT 637 FOR MEDICARE OP): Performed by: EMERGENCY MEDICINE

## 2024-02-03 PROCEDURE — 2500000004 HC RX 250 GENERAL PHARMACY W/ HCPCS (ALT 636 FOR OP/ED)

## 2024-02-03 PROCEDURE — 84484 ASSAY OF TROPONIN QUANT: CPT

## 2024-02-03 PROCEDURE — 2500000005 HC RX 250 GENERAL PHARMACY W/O HCPCS

## 2024-02-03 PROCEDURE — 96368 THER/DIAG CONCURRENT INF: CPT | Mod: 59

## 2024-02-03 PROCEDURE — 84132 ASSAY OF SERUM POTASSIUM: CPT

## 2024-02-03 PROCEDURE — 70450 CT HEAD/BRAIN W/O DYE: CPT | Performed by: STUDENT IN AN ORGANIZED HEALTH CARE EDUCATION/TRAINING PROGRAM

## 2024-02-03 PROCEDURE — 87086 URINE CULTURE/COLONY COUNT: CPT

## 2024-02-03 PROCEDURE — 93005 ELECTROCARDIOGRAM TRACING: CPT

## 2024-02-03 PROCEDURE — 84443 ASSAY THYROID STIM HORMONE: CPT

## 2024-02-03 PROCEDURE — 2500000001 HC RX 250 WO HCPCS SELF ADMINISTERED DRUGS (ALT 637 FOR MEDICARE OP)

## 2024-02-03 PROCEDURE — 72125 CT NECK SPINE W/O DYE: CPT | Performed by: STUDENT IN AN ORGANIZED HEALTH CARE EDUCATION/TRAINING PROGRAM

## 2024-02-03 PROCEDURE — 82550 ASSAY OF CK (CPK): CPT

## 2024-02-03 PROCEDURE — 87641 MR-STAPH DNA AMP PROBE: CPT

## 2024-02-03 PROCEDURE — 80307 DRUG TEST PRSMV CHEM ANLYZR: CPT

## 2024-02-03 PROCEDURE — 86140 C-REACTIVE PROTEIN: CPT

## 2024-02-03 PROCEDURE — 2500000002 HC RX 250 W HCPCS SELF ADMINISTERED DRUGS (ALT 637 FOR MEDICARE OP, ALT 636 FOR OP/ED)

## 2024-02-03 PROCEDURE — 82947 ASSAY GLUCOSE BLOOD QUANT: CPT

## 2024-02-03 PROCEDURE — 2060000001 HC INTERMEDIATE ICU ROOM DAILY

## 2024-02-03 PROCEDURE — 99223 1ST HOSP IP/OBS HIGH 75: CPT | Performed by: INTERNAL MEDICINE

## 2024-02-03 PROCEDURE — 81001 URINALYSIS AUTO W/SCOPE: CPT

## 2024-02-03 PROCEDURE — 85025 COMPLETE CBC W/AUTO DIFF WBC: CPT

## 2024-02-03 PROCEDURE — 84145 PROCALCITONIN (PCT): CPT

## 2024-02-03 PROCEDURE — 36415 COLL VENOUS BLD VENIPUNCTURE: CPT

## 2024-02-03 PROCEDURE — 83880 ASSAY OF NATRIURETIC PEPTIDE: CPT

## 2024-02-03 PROCEDURE — 87040 BLOOD CULTURE FOR BACTERIA: CPT

## 2024-02-03 PROCEDURE — 96365 THER/PROPH/DIAG IV INF INIT: CPT

## 2024-02-03 RX ORDER — DEXTROSE 50 % IN WATER (D50W) INTRAVENOUS SYRINGE
25
Status: DISCONTINUED | OUTPATIENT
Start: 2024-02-03 | End: 2024-02-10 | Stop reason: HOSPADM

## 2024-02-03 RX ORDER — ENOXAPARIN SODIUM 100 MG/ML
40 INJECTION SUBCUTANEOUS EVERY 12 HOURS SCHEDULED
Status: CANCELLED | OUTPATIENT
Start: 2024-02-03

## 2024-02-03 RX ORDER — AMLODIPINE BESYLATE 10 MG/1
10 TABLET ORAL DAILY
Status: DISCONTINUED | OUTPATIENT
Start: 2024-02-03 | End: 2024-02-10 | Stop reason: HOSPADM

## 2024-02-03 RX ORDER — DEXTROSE MONOHYDRATE 100 MG/ML
0.3 INJECTION, SOLUTION INTRAVENOUS ONCE AS NEEDED
Status: DISCONTINUED | OUTPATIENT
Start: 2024-02-03 | End: 2024-02-10 | Stop reason: HOSPADM

## 2024-02-03 RX ORDER — INSULIN GLARGINE 100 [IU]/ML
50 INJECTION, SOLUTION SUBCUTANEOUS NIGHTLY
Status: DISCONTINUED | OUTPATIENT
Start: 2024-02-03 | End: 2024-02-04

## 2024-02-03 RX ORDER — VANCOMYCIN HYDROCHLORIDE 750 MG/150ML
750 INJECTION, SOLUTION INTRAVENOUS EVERY 12 HOURS
Status: DISCONTINUED | OUTPATIENT
Start: 2024-02-03 | End: 2024-02-04

## 2024-02-03 RX ORDER — CLONIDINE HYDROCHLORIDE 0.1 MG/1
0.1 TABLET ORAL 2 TIMES DAILY
Status: DISCONTINUED | OUTPATIENT
Start: 2024-02-03 | End: 2024-02-10 | Stop reason: HOSPADM

## 2024-02-03 RX ORDER — INSULIN LISPRO 100 [IU]/ML
10 INJECTION, SOLUTION INTRAVENOUS; SUBCUTANEOUS
Status: DISCONTINUED | OUTPATIENT
Start: 2024-02-03 | End: 2024-02-05

## 2024-02-03 RX ORDER — NITROFURANTOIN MACROCRYSTALS 50 MG/1
50 CAPSULE ORAL EVERY 6 HOURS SCHEDULED
Status: DISCONTINUED | OUTPATIENT
Start: 2024-02-03 | End: 2024-02-03

## 2024-02-03 RX ORDER — NYSTATIN 100000 [USP'U]/G
1 POWDER TOPICAL 2 TIMES DAILY
Status: DISCONTINUED | OUTPATIENT
Start: 2024-02-03 | End: 2024-02-10 | Stop reason: HOSPADM

## 2024-02-03 RX ORDER — LANOLIN ALCOHOL/MO/W.PET/CERES
400 CREAM (GRAM) TOPICAL DAILY
Status: DISCONTINUED | OUTPATIENT
Start: 2024-02-03 | End: 2024-02-10 | Stop reason: HOSPADM

## 2024-02-03 RX ORDER — MECLIZINE HCL 12.5 MG 12.5 MG/1
12.5 TABLET ORAL 3 TIMES DAILY PRN
Status: DISCONTINUED | OUTPATIENT
Start: 2024-02-03 | End: 2024-02-10 | Stop reason: HOSPADM

## 2024-02-03 RX ORDER — CHOLECALCIFEROL (VITAMIN D3) 25 MCG
2000 TABLET ORAL
Status: DISCONTINUED | OUTPATIENT
Start: 2024-02-03 | End: 2024-02-10 | Stop reason: HOSPADM

## 2024-02-03 RX ORDER — ALBUTEROL SULFATE 0.83 MG/ML
2.5 SOLUTION RESPIRATORY (INHALATION) EVERY 6 HOURS PRN
Status: DISCONTINUED | OUTPATIENT
Start: 2024-02-03 | End: 2024-02-10 | Stop reason: HOSPADM

## 2024-02-03 RX ORDER — NITROFURANTOIN MACROCRYSTALS 50 MG/1
50 CAPSULE ORAL EVERY 6 HOURS SCHEDULED
Status: DISCONTINUED | OUTPATIENT
Start: 2024-02-03 | End: 2024-02-05

## 2024-02-03 RX ORDER — NAPROXEN SODIUM 220 MG/1
81 TABLET, FILM COATED ORAL
Status: DISCONTINUED | OUTPATIENT
Start: 2024-02-03 | End: 2024-02-10 | Stop reason: HOSPADM

## 2024-02-03 RX ORDER — VANCOMYCIN HYDROCHLORIDE 500 MG/100ML
500 INJECTION, SOLUTION INTRAVENOUS ONCE
Status: COMPLETED | OUTPATIENT
Start: 2024-02-03 | End: 2024-02-03

## 2024-02-03 RX ORDER — ENOXAPARIN SODIUM 100 MG/ML
40 INJECTION SUBCUTANEOUS EVERY 12 HOURS SCHEDULED
Status: DISCONTINUED | OUTPATIENT
Start: 2024-02-03 | End: 2024-02-10 | Stop reason: HOSPADM

## 2024-02-03 RX ORDER — VANCOMYCIN HYDROCHLORIDE 1 G/200ML
1000 INJECTION, SOLUTION INTRAVENOUS ONCE
Status: COMPLETED | OUTPATIENT
Start: 2024-02-03 | End: 2024-02-03

## 2024-02-03 RX ORDER — ATORVASTATIN CALCIUM 40 MG/1
40 TABLET, FILM COATED ORAL DAILY
Status: DISCONTINUED | OUTPATIENT
Start: 2024-02-03 | End: 2024-02-10 | Stop reason: HOSPADM

## 2024-02-03 RX ADMIN — AZITHROMYCIN 500 MG: 500 INJECTION, POWDER, LYOPHILIZED, FOR SOLUTION INTRAVENOUS at 04:49

## 2024-02-03 RX ADMIN — PIPERACILLIN SODIUM AND TAZOBACTAM SODIUM 4.5 G: 4; .5 INJECTION, SOLUTION INTRAVENOUS at 18:08

## 2024-02-03 RX ADMIN — NYSTATIN 1 APPLICATION: 100000 POWDER TOPICAL at 22:42

## 2024-02-03 RX ADMIN — NITROFURANTOIN 50 MG: 50 CAPSULE ORAL at 18:12

## 2024-02-03 RX ADMIN — Medication 2 L/MIN: at 16:45

## 2024-02-03 RX ADMIN — Medication 2 L/MIN: at 02:00

## 2024-02-03 RX ADMIN — CLONIDINE HYDROCHLORIDE 0.1 MG: 0.1 TABLET ORAL at 21:55

## 2024-02-03 RX ADMIN — NYSTATIN 1 APPLICATION: 100000 POWDER TOPICAL at 10:36

## 2024-02-03 RX ADMIN — VANCOMYCIN HYDROCHLORIDE 1000 MG: 1 INJECTION, SOLUTION INTRAVENOUS at 04:47

## 2024-02-03 RX ADMIN — INSULIN GLARGINE 50 UNITS: 100 INJECTION, SOLUTION SUBCUTANEOUS at 21:55

## 2024-02-03 RX ADMIN — ENOXAPARIN SODIUM 40 MG: 100 INJECTION SUBCUTANEOUS at 21:55

## 2024-02-03 RX ADMIN — VANCOMYCIN HYDROCHLORIDE 750 MG: 750 INJECTION, SOLUTION INTRAVENOUS at 22:42

## 2024-02-03 RX ADMIN — SODIUM CHLORIDE, POTASSIUM CHLORIDE, SODIUM LACTATE AND CALCIUM CHLORIDE 500 ML: 600; 310; 30; 20 INJECTION, SOLUTION INTRAVENOUS at 20:20

## 2024-02-03 RX ADMIN — VANCOMYCIN HYDROCHLORIDE 500 MG: 500 INJECTION, SOLUTION INTRAVENOUS at 06:31

## 2024-02-03 RX ADMIN — Medication 400 MG: at 18:11

## 2024-02-03 SDOH — SOCIAL STABILITY: SOCIAL INSECURITY: DO YOU FEEL UNSAFE GOING BACK TO THE PLACE WHERE YOU ARE LIVING?: NO

## 2024-02-03 SDOH — SOCIAL STABILITY: SOCIAL INSECURITY: WERE YOU ABLE TO COMPLETE ALL THE BEHAVIORAL HEALTH SCREENINGS?: YES

## 2024-02-03 SDOH — SOCIAL STABILITY: SOCIAL INSECURITY: ARE THERE ANY APPARENT SIGNS OF INJURIES/BEHAVIORS THAT COULD BE RELATED TO ABUSE/NEGLECT?: NO

## 2024-02-03 SDOH — SOCIAL STABILITY: SOCIAL INSECURITY: DOES ANYONE TRY TO KEEP YOU FROM HAVING/CONTACTING OTHER FRIENDS OR DOING THINGS OUTSIDE YOUR HOME?: NO

## 2024-02-03 SDOH — SOCIAL STABILITY: SOCIAL INSECURITY: ABUSE: ADULT

## 2024-02-03 SDOH — SOCIAL STABILITY: SOCIAL INSECURITY: DO YOU FEEL ANYONE HAS EXPLOITED OR TAKEN ADVANTAGE OF YOU FINANCIALLY OR OF YOUR PERSONAL PROPERTY?: NO

## 2024-02-03 SDOH — SOCIAL STABILITY: SOCIAL INSECURITY: HAVE YOU HAD THOUGHTS OF HARMING ANYONE ELSE?: NO

## 2024-02-03 SDOH — SOCIAL STABILITY: SOCIAL INSECURITY: ARE YOU OR HAVE YOU BEEN THREATENED OR ABUSED PHYSICALLY, EMOTIONALLY, OR SEXUALLY BY ANYONE?: NO

## 2024-02-03 SDOH — SOCIAL STABILITY: SOCIAL INSECURITY: HAS ANYONE EVER THREATENED TO HURT YOUR FAMILY OR YOUR PETS?: NO

## 2024-02-03 ASSESSMENT — LIFESTYLE VARIABLES
HOW MANY STANDARD DRINKS CONTAINING ALCOHOL DO YOU HAVE ON A TYPICAL DAY: 1 OR 2
HAVE YOU EVER FELT YOU SHOULD CUT DOWN ON YOUR DRINKING: NO
AUDIT-C TOTAL SCORE: 1
HOW OFTEN DO YOU HAVE A DRINK CONTAINING ALCOHOL: MONTHLY OR LESS
SKIP TO QUESTIONS 9-10: 1
HAVE PEOPLE ANNOYED YOU BY CRITICIZING YOUR DRINKING: NO
SKIP TO QUESTIONS 9-10: 1
EVER HAD A DRINK FIRST THING IN THE MORNING TO STEADY YOUR NERVES TO GET RID OF A HANGOVER: NO
EVER FELT BAD OR GUILTY ABOUT YOUR DRINKING: NO
HOW OFTEN DO YOU HAVE A DRINK CONTAINING ALCOHOL: MONTHLY OR LESS
HOW OFTEN DO YOU HAVE 6 OR MORE DRINKS ON ONE OCCASION: NEVER
HOW MANY STANDARD DRINKS CONTAINING ALCOHOL DO YOU HAVE ON A TYPICAL DAY: 1 OR 2
AUDIT-C TOTAL SCORE: 1
HOW OFTEN DO YOU HAVE 6 OR MORE DRINKS ON ONE OCCASION: NEVER

## 2024-02-03 ASSESSMENT — ACTIVITIES OF DAILY LIVING (ADL)
GROOMING: INDEPENDENT
HEARING - RIGHT EAR: FUNCTIONAL
HEARING - LEFT EAR: FUNCTIONAL
BATHING: INDEPENDENT
DRESSING YOURSELF: INDEPENDENT
DRESSING YOURSELF: INDEPENDENT
ADEQUATE_TO_COMPLETE_ADL: YES
BATHING: INDEPENDENT
PATIENT'S MEMORY ADEQUATE TO SAFELY COMPLETE DAILY ACTIVITIES?: YES
PATIENT'S MEMORY ADEQUATE TO SAFELY COMPLETE DAILY ACTIVITIES?: YES
ADEQUATE_TO_COMPLETE_ADL: YES
TOILETING: NEEDS ASSISTANCE
JUDGMENT_ADEQUATE_SAFELY_COMPLETE_DAILY_ACTIVITIES: YES
GROOMING: INDEPENDENT
FEEDING YOURSELF: INDEPENDENT
WALKS IN HOME: NEEDS ASSISTANCE
HEARING - LEFT EAR: FUNCTIONAL
WALKS IN HOME: NEEDS ASSISTANCE
JUDGMENT_ADEQUATE_SAFELY_COMPLETE_DAILY_ACTIVITIES: YES
HEARING - RIGHT EAR: FUNCTIONAL
FEEDING YOURSELF: INDEPENDENT
TOILETING: NEEDS ASSISTANCE

## 2024-02-03 ASSESSMENT — COGNITIVE AND FUNCTIONAL STATUS - GENERAL
DAILY ACTIVITIY SCORE: 23
WALKING IN HOSPITAL ROOM: A LITTLE
MOVING TO AND FROM BED TO CHAIR: A LITTLE
STANDING UP FROM CHAIR USING ARMS: A LITTLE
MOBILITY SCORE: 19
TOILETING: A LITTLE
PATIENT BASELINE BEDBOUND: NO
CLIMB 3 TO 5 STEPS WITH RAILING: A LOT

## 2024-02-03 ASSESSMENT — ENCOUNTER SYMPTOMS
SORE THROAT: 0
LIGHT-HEADEDNESS: 1
ABDOMINAL PAIN: 1
DIARRHEA: 1
HEMATURIA: 0
COUGH: 1
FACIAL ASYMMETRY: 0
FREQUENCY: 0
DIFFICULTY URINATING: 0
DIZZINESS: 0
SHORTNESS OF BREATH: 0

## 2024-02-03 ASSESSMENT — PATIENT HEALTH QUESTIONNAIRE - PHQ9
2. FEELING DOWN, DEPRESSED OR HOPELESS: SEVERAL DAYS
2. FEELING DOWN, DEPRESSED OR HOPELESS: SEVERAL DAYS
SUM OF ALL RESPONSES TO PHQ9 QUESTIONS 1 & 2: 1
SUM OF ALL RESPONSES TO PHQ9 QUESTIONS 1 & 2: 1
1. LITTLE INTEREST OR PLEASURE IN DOING THINGS: NOT AT ALL
1. LITTLE INTEREST OR PLEASURE IN DOING THINGS: NOT AT ALL

## 2024-02-03 ASSESSMENT — PAIN SCALES - GENERAL
PAINLEVEL_OUTOF10: 0 - NO PAIN
PAINLEVEL_OUTOF10: 0 - NO PAIN

## 2024-02-03 NOTE — ED PROVIDER NOTES
HPI   Chief Complaint   Patient presents with    Cold Exposure     Ems pt sts, pt in and had toe amputated approx a week and half ago, dc'd then readmit for pneumonia and dc'd a couple days ago, boyfriend called ems tonight for ams secondary to being cold. Pt denies any drug use, ems sts bgl 70 and gluc given, along with 4 narcan with no response.       Patient is a 58-year-old female with sickle cell disease, diabetes, diabetic neuropathy, hypertension, recent pneumonia on 4 L nasal cannula presents emergency department for altered mental status.  Patient is here with her boyfriend.  He came home and found her on the floor.  She did not have her oxygen on.  She seemed confused.  He called 911 and her blood sugar was in the 70s.  They gave her glucose as well as Narcan with some improvement in her mentation.  Patient was recently admitted to the emergency department for a right big toe amputation.  She was also found to have a pneumonia and was discharged on 4 L nasal cannula as well as with a PICC line in her right upper chest for daily ceftriaxone infusions over next 21 days.  She states that she has been compliant with her medications.  By the time the patient arrives to the emergency department and is examined, she is alert and oriented x 3.  She does not remember falling.  She did have a brief moment of chest pain here in the emergency department that resolved in seconds to minutes.  Patient is hypothermic here.  Boyfriend states that their house is heated, but he does not know how long she has been on the floor.  She denies pain anywhere else.      History provided by:  Patient, EMS personnel, significant other and medical records                      [place] Coma Scale Score: 15                  Patient History   Past Medical History:   Diagnosis Date    Personal history of other diseases of the circulatory system     History of hypertension    Personal history of other endocrine, nutritional and metabolic  disease     History of hyperlipidemia    Personal history of other endocrine, nutritional and metabolic disease     History of diabetes mellitus     Past Surgical History:   Procedure Laterality Date    CT ANGIO NECK  1/25/2023    CT NECK ANGIO W AND WO IV CONTRAST 1/25/2023 [facility]    CT HEAD ANGIO W AND WO IV CONTRAST  1/25/2023    CT HEAD ANGIO W AND WO IV CONTRAST 1/25/2023 DOCTOR OFFICE [facility]    OTHER SURGICAL HISTORY  10/21/2020    Toe amputation     Family History   Problem Relation Name Age of Onset    Alzheimer's disease Mother      Diabetes Mother      Lung cancer Mother      Diabetes Father      Lung cancer Father      Pancreatic cancer Father      Diabetes Sister      Lung cancer Sister       Social History     Tobacco Use    Smoking status: Never     Passive exposure: Never    Smokeless tobacco: Never   Substance Use Topics    Alcohol use: Never    Drug use: Never       Physical Exam   ED Triage Vitals [02/02/24 2245]   Temperature Heart Rate Respirations BP   (!) 31.2 °C (88.2 °F) 72 16 (!) 151/108      Pulse Ox Temp Source Heart Rate Source Patient Position   95 % Rectal Monitor Lying      BP Location FiO2 (%)     Right arm 28 %       Physical Exam  Vitals and nursing note reviewed.   Constitutional:       General: She is not in acute distress.     Appearance: She is well-developed.   HENT:      Head: Normocephalic and atraumatic.      Right Ear: External ear normal.      Left Ear: External ear normal.      Nose: Nose normal.      Mouth/Throat:      Mouth: Mucous membranes are moist.   Eyes:      General: No scleral icterus.     Extraocular Movements: Extraocular movements intact.      Conjunctiva/sclera: Conjunctivae normal.      Pupils: Pupils are equal, round, and reactive to light.   Cardiovascular:      Rate and Rhythm: Normal rate and regular rhythm.      Heart sounds: No murmur heard.  Pulmonary:      Effort: Pulmonary effort is normal. No respiratory distress.      Breath sounds:  Normal breath sounds.   Abdominal:      Palpations: Abdomen is soft.      Tenderness: There is no abdominal tenderness.   Musculoskeletal:         General: No swelling.      Cervical back: Neck supple.      Comments: Postop partial amputation of the right big toe.  Clean dressings in place, no purulent discharge.   Skin:     General: Skin is warm and dry.      Comments: Right upper chest PICC line.  Site does not appear to be erythematous, purulent, fluctuant.   Neurological:      General: No focal deficit present.      Mental Status: She is alert.      Comments: GCS 15, moves all 4 extremities on command.   Psychiatric:         Mood and Affect: Mood normal.         ED Course & MDM   Diagnoses as of 02/03/24 0910   Syncope, unspecified syncope type   Pneumonia of left lower lobe due to infectious organism   Hypothermia, initial encounter       Medical Decision Making  Patient is a 58-year-old female presents emergency department for altered mental status and unwitnessed fall.  She is noted to be hypothermic at 31.2 °C rectally, but otherwise hemodynamically stable.  She was altered for EMS and had received glucose as well as Narcan.  She is now alert and oriented x 3 and a repeat blood glucose is in the 90s.  Active external warming with heat pads and warm blankets started.  Given that the patient has recent infections, we will obtain blood cultures and lab work.  She had fallen and does not remember the incident, will obtain a CT head and C-spine.  She did report a very brief episode of chest pain that resolved spontaneously.    CBC is negative for leukocytosis, mild leukopenia 4.3.  Hemoglobin is 10.3, improved from her prior lab work.  Chemistry panel overall unremarkable and glucose is in the 90s.  Urinalysis shows small leukocyte esterase but no bacteria and minimal white blood cells.  Urine is sent for culture.  Troponin, TSH within normal limits.  BNP slightly elevated at 185.  Creatine kinase normal at  74.    Patient's temperature did increase appropriately and she is no longer hypothermic.  She has returned to baseline.  Her chest x-ray did show left lower lobe pneumonia versus CHF.  Given that the patient has a only mildly elevated BNP and she had a recent hospitalization with no pneumonia, we will treat this as worsening/new pneumonia.  Patient is already on ceftriaxone 2 g IV at home and recent hospitalization, so she is given vancomycin, Zosyn, and azithromycin.  We discussed patient's ability to care for herself at home.  Her daughter does not feel like she is safe to go home and the patient is agreeable for admission and further evaluation with PT/OT for placement and/or acute rehab.  This was discussed with the admissions coordinator and patient is admitted to medicine service.    Mike Nino DO, PGY 3  Emergency Medicine Resident    Amount and/or Complexity of Data Reviewed  Labs: ordered.  Radiology: ordered.  ECG/medicine tests: ordered and independent interpretation performed.     Details: EKG at 0045 on 2/3/2024 as interpreted by myself: Ventricular rate 89, , QTc 474.  There is significant artifact in machine interpreted as accelerated junctional rhythm, but there is a P wave noted before each QRS.  No ST elevations or depressions are noted.  No acute injury pattern.        Procedure  Procedures     Mike Nino DO  Resident  02/03/24 0916

## 2024-02-03 NOTE — H&P
History Of Present Illness  Nuris Squires is a 58 y.o. female with PMH T2DM on home insulin (uncontrolled) c/b neuropathy, HTN, CKD stage III, multiple foot infections s/p recent partial R big toe amputation, recent admission for PNA on 4L home O2 presenting with altered mental status and syncope.     Patient states that she was at home yesterday evening when she stood up from her couch to go to the bathroom. She began to shaky and lightheaded and the next thing she knew she was on the ground being woken up by her boyfriend. She had only eaten breakfast that day and wasn't that hungry and had taken her normal basal lantus the night before and only one dose of humalog 10 units. Per ED note, patient's boyfriend found patient on the floor unconscious for an unknown period of time. EMS was called. Patient's glucose per EMS was 70. Patient was given both glucose and narcan and had improvement in her mental status but was taken to  for further evaluation. Per patient's daughters, she had one other episode of syncope approximately 2 weeks ago where she stood up and fell back into the chair unconscious for a few moments before regaining consciousness. Patient does not remember this episode.     Of note, patient was recently admitted in early January for infection of the R great toe s/p partial amputation and 6 weeks of IV Ceftriaxone, and from  01/24-01/31 with acute hypoxic respiratory failure secondary to hypertensive emergency, fluid overload and flash pulmonary edema, likely secondary to missing home HTN medications discharged on 4L O2.     In the ED on this admission, patient was noted to be severely hypothermic to 88.2F (31.2C) and was aggressively warmed with heated blankets and warming packs. BP was 151/108. Labwork was significant for BG 94, WBC 4.3, Hgb 10.3. . Urinalysis showed small Leukocyte esterase. Hypothermia resolved, however given low temperature, recent admission for pneumonia and being on IV  Abx for a toe wound s/p amputation, blood cultures were drawn and patient was given zosyn and vancomycin, and admitted for further workup of syncope and cause of hypothermia.     When seen this AM, patient reports feeling back to baseline. She does not endorse lightheadedness, dizziness or fatigue. She does not endorse any shakiness or feelings similar to when she has low blood sugars. Of note, patient states she begins to have hypoglycemic symptoms at BGs <200. She also denies any urinary incontinence or tongue biting, and does not endorse pain or burning with urination. She does endorse a continued cough from previous admission that is not worse or better, and has had some diarrhea over the last week, approximately 2-3 loose bowel movements daily.  Past Medical History  Past Medical History:   Diagnosis Date    Personal history of other diseases of the circulatory system     History of hypertension    Personal history of other endocrine, nutritional and metabolic disease     History of hyperlipidemia    Personal history of other endocrine, nutritional and metabolic disease     History of diabetes mellitus       Surgical History  Past Surgical History:   Procedure Laterality Date    CT ANGIO NECK  1/25/2023    CT NECK ANGIO W AND WO IV CONTRAST 1/25/2023 [facility]    CT HEAD ANGIO W AND WO IV CONTRAST  1/25/2023    CT HEAD ANGIO W AND WO IV CONTRAST 1/25/2023 [facility]    OTHER SURGICAL HISTORY  10/21/2020    Toe amputation        Social History  She reports that she has never smoked. She has never been exposed to tobacco smoke. She has never used smokeless tobacco. She reports that she does not drink alcohol and does not use drugs.    Family History  Family History   Problem Relation Name Age of Onset    Alzheimer's disease Mother      Diabetes Mother      Lung cancer Mother      Diabetes Father      Lung cancer Father      Pancreatic cancer Father      Diabetes Sister      Lung cancer Sister  "         Allergies  Patient has no known allergies.    Review of Systems   HENT:  Negative for congestion and sore throat.    Respiratory:  Positive for cough. Negative for shortness of breath.    Gastrointestinal:  Positive for abdominal pain and diarrhea.   Genitourinary:  Negative for difficulty urinating, frequency and hematuria.   Neurological:  Positive for light-headedness. Negative for dizziness and facial asymmetry.   All other systems reviewed and are negative.       Physical Exam  Constitutional:       General: She is not in acute distress.     Appearance: She is obese. She is not ill-appearing.   HENT:      Head: Normocephalic.      Nose: Nose normal.      Mouth/Throat:      Mouth: Mucous membranes are moist.      Pharynx: Oropharynx is clear.   Eyes:      General: No scleral icterus.     Pupils: Pupils are equal, round, and reactive to light.   Cardiovascular:      Rate and Rhythm: Normal rate and regular rhythm.      Pulses: Normal pulses.      Heart sounds: No murmur heard.  Pulmonary:      Effort: Pulmonary effort is normal.   Abdominal:      General: There is no distension.      Palpations: Abdomen is soft.      Tenderness: There is abdominal tenderness in the epigastric area.   Musculoskeletal:         General: Swelling and deformity present.   Feet:      Comments: R foot with healing partial R big toe amputation  L foot with healed amputation of 2 toes  Skin:     Capillary Refill: Capillary refill takes less than 2 seconds.      Findings: Rash present.      Comments: Red rash underneath pannus of abdomen, satellite lesions seen   Neurological:      General: No focal deficit present.      Mental Status: She is alert and oriented to person, place, and time.   Psychiatric:         Mood and Affect: Mood normal.         Behavior: Behavior normal.          Last Recorded Vitals  Blood pressure 105/58, pulse 70, temperature 36.3 °C (97.3 °F), temperature source Oral, resp. rate 16, height 1.575 m (5' 2\"), " weight 102 kg (225 lb), SpO2 97 %.    Relevant Results        Scheduled medications  nystatin, 1 Application, Topical, BID  piperacillin-tazobactam, 4.5 g, intravenous, Once      Continuous medications     PRN medications  PRN medications: oxygen  Results for orders placed or performed during the hospital encounter of 02/02/24 (from the past 24 hour(s))   POCT GLUCOSE   Result Value Ref Range    POCT Glucose 96 74 - 99 mg/dL   CBC and Auto Differential   Result Value Ref Range    WBC 4.3 (L) 4.4 - 11.3 x10*3/uL    nRBC 0.0 0.0 - 0.0 /100 WBCs    RBC 3.44 (L) 4.00 - 5.20 x10*6/uL    Hemoglobin 10.3 (L) 12.0 - 16.0 g/dL    Hematocrit 31.1 (L) 36.0 - 46.0 %    MCV 90 80 - 100 fL    MCH 29.9 26.0 - 34.0 pg    MCHC 33.1 32.0 - 36.0 g/dL    RDW 13.2 11.5 - 14.5 %    Platelets 482 (H) 150 - 450 x10*3/uL    Neutrophils % 76.9 40.0 - 80.0 %    Immature Granulocytes %, Automated 0.5 0.0 - 0.9 %    Lymphocytes % 18.6 13.0 - 44.0 %    Monocytes % 3.5 2.0 - 10.0 %    Eosinophils % 0.0 0.0 - 6.0 %    Basophils % 0.5 0.0 - 2.0 %    Neutrophils Absolute 3.31 1.20 - 7.70 x10*3/uL    Immature Granulocytes Absolute, Automated 0.02 0.00 - 0.70 x10*3/uL    Lymphocytes Absolute 0.80 (L) 1.20 - 4.80 x10*3/uL    Monocytes Absolute 0.15 0.10 - 1.00 x10*3/uL    Eosinophils Absolute 0.00 0.00 - 0.70 x10*3/uL    Basophils Absolute 0.02 0.00 - 0.10 x10*3/uL   Comprehensive metabolic panel   Result Value Ref Range    Glucose 94 74 - 99 mg/dL    Sodium 147 (H) 136 - 145 mmol/L    Potassium 4.6 3.5 - 5.3 mmol/L    Chloride 107 98 - 107 mmol/L    Bicarbonate 26 21 - 32 mmol/L    Anion Gap 19 10 - 20 mmol/L    Urea Nitrogen 19 6 - 23 mg/dL    Creatinine 0.89 0.50 - 1.05 mg/dL    eGFR 75 >60 mL/min/1.73m*2    Calcium 9.6 8.6 - 10.6 mg/dL    Albumin 3.4 3.4 - 5.0 g/dL    Alkaline Phosphatase 91 33 - 110 U/L    Total Protein 7.1 6.4 - 8.2 g/dL    AST 21 9 - 39 U/L    Bilirubin, Total 0.4 0.0 - 1.2 mg/dL    ALT 22 7 - 45 U/L   Blood Culture    Specimen:  Peripheral Venipuncture; Blood culture   Result Value Ref Range    Blood Culture Loaded on Instrument - Culture in progress    Blood Culture    Specimen: Peripheral Venipuncture; Blood culture   Result Value Ref Range    Blood Culture Loaded on Instrument - Culture in progress    TSH with reflex to Free T4 if abnormal   Result Value Ref Range    Thyroid Stimulating Hormone 0.46 0.44 - 3.98 mIU/L   Troponin I, High Sensitivity, Initial   Result Value Ref Range    Troponin I, High Sensitivity 12 0 - 34 ng/L   Creatine Kinase   Result Value Ref Range    Creatine Kinase 74 0 - 215 U/L   B-type natriuretic peptide   Result Value Ref Range     (H) 0 - 99 pg/mL   Light Blue Top   Result Value Ref Range    Extra Tube Hold for add-ons.    C-reactive protein   Result Value Ref Range    C-Reactive Protein 1.86 (H) <1.00 mg/dL   Blood Gas Venous Full Panel Unsolicited   Result Value Ref Range    POCT pH, Venous 7.39 7.33 - 7.43 pH    POCT pCO2, Venous 51 41 - 51 mm Hg    POCT pO2, Venous 39 35 - 45 mm Hg    POCT SO2, Venous 56 45 - 75 %    POCT Oxy Hemoglobin, Venous 55.3 45.0 - 75.0 %    POCT Hematocrit Calculated, Venous 38.0 36.0 - 46.0 %    POCT Sodium, Venous 143 136 - 145 mmol/L    POCT Potassium, Venous 4.4 3.5 - 5.3 mmol/L    POCT Chloride, Venous 109 (H) 98 - 107 mmol/L    POCT Ionized Calicum, Venous 1.23 1.10 - 1.33 mmol/L    POCT Glucose, Venous 105 (H) 74 - 99 mg/dL    POCT Lactate, Venous 1.4 0.4 - 2.0 mmol/L    POCT Base Excess, Venous 4.8 (H) -2.0 - 3.0 mmol/L    POCT HCO3 Calculated, Venous 30.9 (H) 22.0 - 26.0 mmol/L    POCT Hemoglobin, Venous 12.7 12.0 - 16.0 g/dL    POCT Anion Gap, Venous 8.0 (L) 10.0 - 25.0 mmol/L    Patient Temperature 37.0 degrees Celsius   MRSA Surveillance for Vancomycin De-escalation, PCR    Specimen: Anterior Nares; Swab   Result Value Ref Range    MRSA PCR Not Detected Not Detected   Urinalysis with Reflex Culture and Microscopic   Result Value Ref Range    Color, Urine Yellow  Straw, Yellow    Appearance, Urine Hazy (N) Clear    Specific Gravity, Urine 1.010 1.005 - 1.035    pH, Urine 5.0 5.0, 5.5, 6.0, 6.5, 7.0, 7.5, 8.0    Protein, Urine >=500 (3+) (N) NEGATIVE mg/dL    Glucose, Urine 50 (1+) (A) NEGATIVE mg/dL    Blood, Urine NEGATIVE NEGATIVE    Ketones, Urine NEGATIVE NEGATIVE mg/dL    Bilirubin, Urine NEGATIVE NEGATIVE    Urobilinogen, Urine <2.0 <2.0 mg/dL    Nitrite, Urine NEGATIVE NEGATIVE    Leukocyte Esterase, Urine SMALL (1+) (A) NEGATIVE   Microscopic Only, Urine   Result Value Ref Range    WBC, Urine 6-10 (A) 1-5, NONE /HPF    RBC, Urine 3-5 NONE, 1-2, 3-5 /HPF    Squamous Epithelial Cells, Urine 1-9 (SPARSE) Reference range not established. /HPF    Hyaline Casts, Urine 2+ (A) NONE /LPF   Drug Screen, Urine   Result Value Ref Range    Amphetamine Screen, Urine Presumptive Negative Presumptive Negative    Barbiturate Screen, Urine Presumptive Negative Presumptive Negative    Benzodiazepines Screen, Urine Presumptive Negative Presumptive Negative    Cannabinoid Screen, Urine Presumptive Negative Presumptive Negative    Cocaine Metabolite Screen, Urine Presumptive Negative Presumptive Negative    Fentanyl Screen, Urine Presumptive Negative Presumptive Negative    Opiate Screen, Urine Presumptive Negative Presumptive Negative    Oxycodone Screen, Urine Presumptive Negative Presumptive Negative    PCP Screen, Urine Presumptive Negative Presumptive Negative   Procalcitonin   Result Value Ref Range    Procalcitonin 0.05 <=0.07 ng/mL     CT head wo IV contrast    Result Date: 2/3/2024  Interpreted By:  Cheyanne Herrera,  Luis Carlos Pedroza STUDY: CT HEAD WO IV CONTRAST; CT CERVICAL SPINE WO IV CONTRAST;  2/3/2024 12:24 am   INDICATION: Signs/Symptoms:Unwitnessed fall, altered mental status   COMPARISON: CT head: 06/27/2023, CT C-spine 01/25/2023   ACCESSION NUMBER(S): [id number]; [id number]   ORDERING CLINICIAN: CHEYANNE REYES   TECHNIQUE: Axial noncontrast CT images of head with  coronal and sagittal reconstructed images. Axial noncontrast CT images of the cervical spine with coronal and sagittal reconstructed images.   FINDINGS: CT HEAD:   BRAIN PARENCHYMA: Unchanged calcification of the dentate nuclei and globus pallidi. No acute intraparenchymal hemorrhage or parenchymal evidence of acute large territory ischemic infarct. No mass-effect. Gray-white matter distinction is preserved.   VENTRICLES and EXTRA-AXIAL SPACES:  No acute extra-axial or intraventricular hemorrhage. No effacement of cerebral sulci. Ventricles and sulci are age-concordant.   PARANASAL SINUSES/MASTOIDS:  No hemorrhage or air-fluid levels within the visualized paranasal sinuses. The mastoids are well aerated.   CALVARIUM/ORBITS:  No skull fracture.  The orbits and globes are intact to the extent visualized.   EXTRACRANIAL SOFT TISSUES: No discernible abnormality.     CT CERVICAL SPINE:   PREVERTEBRAL SOFT TISSUES: Within normal limits.   CRANIOCERVICAL JUNCTION: Intact.   ALIGNMENT:  No traumatic malalignment or traumatic facet widening.   VERTEBRAE: Vertebral body heights are maintained. No acute fracture. 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 Thick ossification of the anterior longitudinal ligament and bridging osteophytes at C3-C4 through C6-C7 compatible with DISH.   SPINAL CANAL/INTERVERTEBRAL DISCS: No high-grade spinal canal stenosis. No significant disc height loss.   NEURAL FORAMINA: Multilevel facet and uncovertebral joint arthropathy result in mild right C4-C5 foraminal stenosis but no high-grade foraminal stenosis.   OTHER: None.       CT HEAD: 1. No acute intracranial abnormality or calvarial fracture.   CT CERVICAL SPINE: 1. No acute fracture or traumatic malalignment of the cervical spine. 2. Redemonstration of DISH without significant canal or foraminal stenosis. 3. Redemonstration of chronic ununited fracture of the C6 spinous process.   I personally reviewed the images/study  and I agree with the findings as stated by Resident Yovany Tolliver MD. This study was interpreted at [facility], [place], [place].   MACRO: None.   Signed by: Cheyanne Herrera 2/3/2024 1:56 AM Dictation workstation:   [id number]    CT cervical spine wo IV contrast    Result Date: 2/3/2024  Interpreted By:  Cheyanne Herrera and Dervishi Mario STUDY: CT HEAD WO IV CONTRAST; CT CERVICAL SPINE WO IV CONTRAST;  2/3/2024 12:24 am   INDICATION: Signs/Symptoms:Unwitnessed fall, altered mental status   COMPARISON: CT head: 06/27/2023, CT C-spine 01/25/2023   ACCESSION NUMBER(S): [id number]; [id number]   ORDERING CLINICIAN: CHEYANNE REYES   TECHNIQUE: Axial noncontrast CT images of head with coronal and sagittal reconstructed images. Axial noncontrast CT images of the cervical spine with coronal and sagittal reconstructed images.   FINDINGS: CT HEAD:   BRAIN PARENCHYMA: Unchanged calcification of the dentate nuclei and globus pallidi. No acute intraparenchymal hemorrhage or parenchymal evidence of acute large territory ischemic infarct. No mass-effect. Gray-white matter distinction is preserved.   VENTRICLES and EXTRA-AXIAL SPACES:  No acute extra-axial or intraventricular hemorrhage. No effacement of cerebral sulci. Ventricles and sulci are age-concordant.   PARANASAL SINUSES/MASTOIDS:  No hemorrhage or air-fluid levels within the visualized paranasal sinuses. The mastoids are well aerated.   CALVARIUM/ORBITS:  No skull fracture.  The orbits and globes are intact to the extent visualized.   EXTRACRANIAL SOFT TISSUES: No discernible abnormality.     CT CERVICAL SPINE:   PREVERTEBRAL SOFT TISSUES: Within normal limits.   CRANIOCERVICAL JUNCTION: Intact.   ALIGNMENT:  No traumatic malalignment or traumatic facet widening.   VERTEBRAE: Vertebral body heights are maintained. No acute fracture. 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 Thick ossification of the  anterior longitudinal ligament and bridging osteophytes at C3-C4 through C6-C7 compatible with DISH.   SPINAL CANAL/INTERVERTEBRAL DISCS: No high-grade spinal canal stenosis. No significant disc height loss.   NEURAL FORAMINA: Multilevel facet and uncovertebral joint arthropathy result in mild right C4-C5 foraminal stenosis but no high-grade foraminal stenosis.   OTHER: None.       CT HEAD: 1. No acute intracranial abnormality or calvarial fracture.   CT CERVICAL SPINE: 1. No acute fracture or traumatic malalignment of the cervical spine. 2. Redemonstration of DISH without significant canal or foraminal stenosis. 3. Redemonstration of chronic ununited fracture of the C6 spinous process.   I personally reviewed the images/study and I agree with the findings as stated by Resident Yovany Tolliver MD. This study was interpreted at [place], [place].   MACRO: None.   Signed by: Mike Herrera 2/3/2024 1:56 AM Dictation workstation:   [id number]    XR chest 1 view    Result Date: 2/3/2024  STUDY: Chest Radiograph;  2/2/2024 11:56 PM INDICATION: Fall and altered mental status. COMPARISON: 1/30/2024 XR Chest two view ACCESSION NUMBER(S): [id number] ORDERING CLINICIAN: Mike Nino TECHNIQUE:  Frontal chest was obtained at 23:56 hours. FINDINGS: CARDIOMEDIASTINAL SILHOUETTE: Cardiomediastinal silhouette is enlarged in size and configuration. Right IJ central venous catheter is seen with the tip in the upper SVC.  LUNGS: Increased interstitial markings are seen bilaterally with airspace disease at the left lung base and along left cardiac border.  Left pleural effusion may be present.  ABDOMEN: No remarkable upper abdominal findings.  BONES: No acute osseous changes.    Cardiomegaly with increased interstitial markings bilaterally and left basilar airspace disease with question of a left effusion.  CHF is a consideration however left lower lobe pneumonia is suspected in the  appropriate clinical setting. Signed by Jhon Stanton        Assessment/Plan   Principal Problem:    Syncope, unspecified syncope type      Nuris Squires is a 58 y.o. female with PMH T2DM on home insulin (uncontrolled) c/b neuropathy, HTN, CKD stage III, multiple foot infections s/p recent partial R big toe amputation, recent admission for PNA on 4L home O2 presenting with altered mental status and syncope. Cause of syncope includes hypoglycemia vs orthostatics vs sepsis vs less likely cardiac cause. Patient had recent partial R big toe amputation with prolonged IV ceftriaxone treatment. Patient does have E. Faecium on UA from prior admission.     #Syncope  #Concern for Sepsis   #Hypothermia, resolved  -Echo during last admission 01/25 shows LVEF 70-75%, mildly increased LV septal wall thickness, otherwise unremarkable    Plan  -Follow up blood cultures  -IV Fluid bolus if orthostats positive  -Continue IV vancomycin and zosyn. In setting of sepsis workup can hold IV ceftriaxone as Vanc/Zosyn will cover empirically   -Will add nitrofurantoin to cover recent E. Faecium found on UCx during last admission  -Repeat Ucx  -Follow up ESR  -Telemetry    #T2DM c/b neuropathy, uncontrolled c/b neuropathy  -on home lantus 70 units, prandial lispro  -A1c 13.1% on 01/24/2024  Plan  - Lantus 50 units nightly, prandial lispro 10 units TID, SSI  -hold metformin  -can hold on endocrinology consult for now pending current insulin regimen    #amputation of 2 toes on left foot  #recent R big toe partial amputation, on   -Had R big toe partial amputation on 01/12 with podiatry for source control of infection  -Intra-op culture grew rare Group B streptococcus  -R chest picc line placed for 6 weeks of IV ceftriaxone to end 02/21/2024  Plan  -hold IV ceftriaxone while receiving nitrofurantoin and IV Vanc, zosyn   -CTM wound for drainage/appearance of infection    #Recent PNA   #prior flash pulmonary edema  -discharged home on 4L O2 on  01/31/2024  -incentive spirometer   -Wean O2 as tolerated    #HTN  -Continue home clonidine and amlodipine  -hold home lisinopril  -continue statin, aspirin    #Hx of CKD Stage III  #Recent VENKAT   -Creatinine at time of discharge from last admission 01/31 was 1.55  -CTM with daily RFP    F: PRN  E: PRN  N: Diabetic  A: PIV, PICC line    DVT prophylaxis: Lovenox BID    Code status: Full code (confirmed on admission)  NOK:     Juarez Bruce MD

## 2024-02-03 NOTE — PROGRESS NOTES
I saw and evaluated the patient. I personally obtained the key and critical portions of the history and physical exam or was physically present for key and critical portions performed by the resident/student. I reviewed the resident/student's documentation and discussed the patient with the resident/student. I agree with the resident/student's medical decision making as documented in the note with the exception/addition of the following:      Adelina Squires is a 58 y.o. female on hospital day 0 with past medical history as per housestaff note including review of recent admission here for which she was discharged approximately 48 hours prior to presenting back after possible syncopal episode.  Patient states she woke up doing okay and had an early breakfast which is unknown usual for her.  Patient took her Basaglar last night per usual and then the a.m. Humalog 10 units but because she did not eat any lunch or dinner she did not take any further Humalog doses.  Then at approximately 7:30 PM on February 1 she got up out of her chair to go to the bathroom and became lightheaded and felt very shaky as if her blood sugar was low.  no chest pain or palpitations, no headache, no vision changes/blurring of vision or white out vision, no reported hearing changes/whooshing noises, no new focal numbness or weakness, no seizure-like activity witnessed or reported, no bowel or bladder incontinence, no tongue biting.  Denies nausea/vomiting.  Did not have profuse diaphoresis prior to episode.  Patient and family are uncertain how long patient was on the ground before her boyfriend found her but suspected could have been as long as an hour.  EMS came and gave her some Narcan and dextrose with improvement in her mental status.  Patient states she starts becoming lightheaded and shaky whenever her sugar gets under 200-250 range because she is used to very high blood glucose level apparently.  Patient denies any narcotic  use or other drug use.  Denies new medications or sedation medications.  P.o. intake has been marginal as noted.  Patient does endorse continued cough from her recent admission with pneumonia.  Cough is minimally productive.  No fevers/chills, no abdominal pain, no shortness of breath, no sore throat, no sinus pain, no diarrhea or constipation (although she did states she was having loose stools and they are not quite formed now although sample I saw in the bedpan was rather formed.), no dysuria but possible urinary frequency, and no new rashes other than under her pannus/groin area and under her breast.  Patient also denies any pain over her recently amputated toe site and no significant drainage or erythema.  On presentation patient was noted to be severely hypothermic which was treated with IV antibiotics as well as blankets although no bear hugger was used.      Objective     Exam     Vitals:    02/03/24 0937 02/03/24 1036 02/03/24 1045 02/03/24 1046   BP: 135/74 110/57 110/50 105/58   BP Location:       Patient Position:   Lying Sitting   Pulse: 78 68 74 70   Resp: 16 16     Temp:       TempSrc:       SpO2:  97%     Weight:       Height:          Intake/Output last 3 shifts:  No intake/output data recorded.    Physical Exam  Vitals reviewed.   Constitutional:       Comments: Very pleasant   HENT:      Head: Normocephalic and atraumatic.      Mouth/Throat:      Mouth: Mucous membranes are dry.   Neck:      Vascular: No carotid bruit.   Cardiovascular:      Rate and Rhythm: Normal rate and regular rhythm.      Pulses: Normal pulses.      Heart sounds: No murmur heard.     No friction rub. No gallop.   Pulmonary:      Effort: Pulmonary effort is normal.      Breath sounds: Normal breath sounds. No wheezing, rhonchi or rales.   Abdominal:      General: Bowel sounds are normal. There is no distension.      Palpations: Abdomen is soft.      Tenderness: There is no abdominal tenderness. There is no guarding or  "rebound.   Musculoskeletal:      Cervical back: Neck supple.      Right lower leg: No edema.      Left lower leg: No edema.      Comments: Right great toe amputation with no significant erythema or tenderness or active drainage expressible from incision site.  Foot does have mild edema   Skin:     General: Skin is warm and dry.      Comments: Candidal rash under pannus and in groin region without signs of further cellulitis   Neurological:      General: No focal deficit present.      Mental Status: She is alert and oriented to person, place, and time.   Psychiatric:         Mood and Affect: Mood normal.         Behavior: Behavior normal.            Medications   nystatin, 1 Application, Topical, BID  piperacillin-tazobactam, 4.5 g, intravenous, Once       PRN medications: oxygen       Labs     All new labs reviewed:  some of the basic labs as follows -     Results from last 7 days   Lab Units 02/03/24  0122 01/31/24  0923 01/30/24  0854   WBC AUTO x10*3/uL 4.3* 5.1 5.8   HEMOGLOBIN g/dL 10.3* 8.7* 8.6*   HEMATOCRIT % 31.1* 29.8* 27.0*   PLATELETS AUTO x10*3/uL 482* 442 416   NEUTROS PCT AUTO % 76.9 42.8 43.6   LYMPHS PCT AUTO % 18.6 41.1 38.8   MONOS PCT AUTO % 3.5 10.0 10.6   EOS PCT AUTO % 0.0 5.1 5.9          Results from last 72 hours   Lab Units 02/03/24  0122   SODIUM mmol/L 147*   POTASSIUM mmol/L 4.6   CHLORIDE mmol/L 107   CO2 mmol/L 26   BUN mg/dL 19   CREATININE mg/dL 0.89     Results from last 72 hours   Lab Units 02/03/24  0122   ALK PHOS U/L 91   AST U/L 21   ALT U/L 22   BILIRUBIN TOTAL mg/dL 0.4   ALBUMIN g/dL 3.4   PROTEIN TOTAL g/dL 7.1     Results from last 72 hours   Lab Units 02/03/24  0122   GLUCOSE mg/dL 94     Results from last 72 hours   Lab Units 02/03/24  0800   LEUKOCYTES U  SMALL (1+)*   NITRITE U  NEGATIVE   WBC UR /HPF 6-10*   RBC UR HPF /HPF 3-5   BLOOD UR  NEGATIVE     No results found for: \"TR1\"  Lab Results   Component Value Date    URINECULTURE 20,000 - 80,000 Enterococcus faecium " (A) 01/29/2024    BLOODCULT Loaded on Instrument - Culture in progress 02/03/2024    BLOODCULT Loaded on Instrument - Culture in progress 02/03/2024    BLOODCULT No growth at 4 days -  FINAL REPORT 01/25/2024    BLOODCULT No growth at 4 days -  FINAL REPORT 01/25/2024            Imaging   CT head wo IV contrast, CT cervical spine wo IV contrast  Narrative: Interpreted By:  Cheyanne Herrera,  and Sharee Pedroza   STUDY:  CT HEAD WO IV CONTRAST; CT CERVICAL SPINE WO IV CONTRAST;  2/3/2024  12:24 am      INDICATION:  Signs/Symptoms:Unwitnessed fall, altered mental status      COMPARISON:  CT head: 06/27/2023, CT C-spine 01/25/2023      ACCESSION NUMBER(S):  [id number]; [id number]      ORDERING CLINICIAN:  CHEYANNE REYES      TECHNIQUE:  Axial noncontrast CT images of head with coronal and sagittal  reconstructed images. Axial noncontrast CT images of the cervical  spine with coronal and sagittal reconstructed images.      FINDINGS:  CT HEAD:      BRAIN PARENCHYMA: Unchanged calcification of the dentate nuclei and  globus pallidi. No acute intraparenchymal hemorrhage or parenchymal  evidence of acute large territory ischemic infarct. No mass-effect.  Gray-white matter distinction is preserved.      VENTRICLES and EXTRA-AXIAL SPACES:  No acute extra-axial or  intraventricular hemorrhage. No effacement of cerebral sulci.  Ventricles and sulci are age-concordant.      PARANASAL SINUSES/MASTOIDS:  No hemorrhage or air-fluid levels within  the visualized paranasal sinuses. The mastoids are well aerated.      CALVARIUM/ORBITS:  No skull fracture.  The orbits and globes are  intact to the extent visualized.      EXTRACRANIAL SOFT TISSUES: No discernible abnormality.          CT CERVICAL SPINE:      PREVERTEBRAL SOFT TISSUES: Within normal limits.      CRANIOCERVICAL JUNCTION: Intact.      ALIGNMENT:  No traumatic malalignment or traumatic facet widening.      VERTEBRAE: Vertebral body heights are maintained. No acute  fracture.  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  Thick ossification of the anterior longitudinal ligament and bridging  osteophytes at C3-C4 through C6-C7 compatible with DISH.      SPINAL CANAL/INTERVERTEBRAL DISCS: No high-grade spinal canal  stenosis. No significant disc height loss.      NEURAL FORAMINA: Multilevel facet and uncovertebral joint arthropathy  result in mild right C4-C5 foraminal stenosis but no high-grade  foraminal stenosis.      OTHER: None.      Impression: CT HEAD:  1. No acute intracranial abnormality or calvarial fracture.      CT CERVICAL SPINE:  1. No acute fracture or traumatic malalignment of the cervical spine.  2. Redemonstration of DISH without significant canal or foraminal  stenosis.  3. Redemonstration of chronic ununited fracture of the C6 spinous  process.      I personally reviewed the images/study and I agree with the findings  as stated by Resident Yovany Tolliver MD. This study was interpreted  at [place], [place].      MACRO:  None.      Signed by: Mike Herrera 2/3/2024 1:56 AM  Dictation workstation:   [id number]  XR chest 1 view  Narrative: STUDY:  Chest Radiograph;  2/2/2024 11:56 PM  INDICATION:  Fall and altered mental status.  COMPARISON:  1/30/2024 XR Chest two view  ACCESSION NUMBER(S):  [id number]  ORDERING CLINICIAN:  Mike Nino  TECHNIQUE:  Frontal chest was obtained at 23:56 hours.  FINDINGS:  CARDIOMEDIASTINAL SILHOUETTE:  Cardiomediastinal silhouette is enlarged in size and configuration.   Right IJ central venous catheter is seen with the tip in the upper  SVC.     LUNGS:  Increased interstitial markings are seen bilaterally with airspace  disease at the left lung base and along left cardiac border.  Left  pleural effusion may be present.     ABDOMEN:  No remarkable upper abdominal findings.     BONES:  No acute osseous changes.  Impression: Cardiomegaly with increased  interstitial markings bilaterally and left  basilar airspace disease with question of a left effusion.  CHF is a  consideration however left lower lobe pneumonia is suspected in the  appropriate clinical setting.  Signed by Jhon Stanton     No results found for this or any previous visit from the past 1095 days.     Encounter Date: 01/24/24   ECG 12 Lead   Result Value    Ventricular Rate 77    Atrial Rate 77    NC Interval 134    QRS Duration 78    QT Interval 396    QTC Calculation(Bazett) 448    P Axis 39    R Axis 26    T Axis 19    QRS Count 12    Q Onset 222    P Onset 155    P Offset 215    T Offset 420    QTC Fredericia 430    Narrative    Normal sinus rhythm  Possible Anterior infarct (cited on or before 15-ISAK-2024)  Abnormal ECG  When compared with ECG of 24-JAN-2024 20:35,  Premature supraventricular complexes are no longer Present  QRS axis Shifted left      EKG: Poor baseline although appears to be sinus rhythm without any obvious acute ischemic changes    Assessment and Plan     Syncope: Possible orthostatic hypotension in the setting of poor p.o. intake and resolving diarrhea versus related to hypoglycemia versus sepsis in the setting of the hypothermia.  Patient is without left shift/bandemia and Pro-Tito is only 0.05 as well as CRP being 1.86.  -Check orthostatic.  Can bolus IV fluids if positive  -Continue broad-spectrum antibiotics at this time.  Given possible  complaints will add nitrofurantoin to cover recent Enterococcus faecium.  Can place hold on ceftriaxone since using broad-spectrum antibiotics  -Follow-up blood cultures  -Follow-up repeat urine culture  -Overall toe appears relatively unremarkable.  At this time we will hold off on any advanced imaging such as MRI and podiatry evaluation.  -Follow-up ESR    Cardiovascular:  -Continue clonidine and amlodipine  -Continue statin and aspirin  -Holding home lisinopril    Diabetes mellitus: Patient symptomatic under 200 per her account.  Of  note patient had multiple blood glucoses in the 70s prior to recent discharge  -Continue lower dose Lantus  -Continue lispro with meals  -Continue sliding scale  -Hold metformin at this time    Pulmonary:  -Incentive spirometer 10 times an hour while awake  -Wean O2    Recent acute renal failure: Creatinine has improved  -Monitor renal function panel    DVT prophylaxis    Physical therapy/Occupational Therapy when appropriate    Austin Nance MD     Of note the above was done with Dragon dictation system.  Note was proofread to minimize errors.

## 2024-02-04 ENCOUNTER — HOME CARE VISIT (OUTPATIENT)
Dept: HOME HEALTH SERVICES | Facility: HOME HEALTH | Age: 59
End: 2024-02-04
Payer: COMMERCIAL

## 2024-02-04 LAB
ABO GROUP (TYPE) IN BLOOD: NORMAL
ALBUMIN SERPL BCP-MCNC: 3.1 G/DL (ref 3.4–5)
ANION GAP SERPL CALC-SCNC: 12 MMOL/L (ref 10–20)
ANTIBODY SCREEN: NORMAL
BASOPHILS # BLD AUTO: 0.07 X10*3/UL (ref 0–0.1)
BASOPHILS NFR BLD AUTO: 0.9 %
BUN SERPL-MCNC: 20 MG/DL (ref 6–23)
CALCIUM SERPL-MCNC: 9 MG/DL (ref 8.6–10.6)
CHLORIDE SERPL-SCNC: 107 MMOL/L (ref 98–107)
CO2 SERPL-SCNC: 29 MMOL/L (ref 21–32)
CREAT SERPL-MCNC: 1.52 MG/DL (ref 0.5–1.05)
EGFRCR SERPLBLD CKD-EPI 2021: 39 ML/MIN/1.73M*2
EOSINOPHIL # BLD AUTO: 0.34 X10*3/UL (ref 0–0.7)
EOSINOPHIL NFR BLD AUTO: 4.1 %
ERYTHROCYTE [DISTWIDTH] IN BLOOD BY AUTOMATED COUNT: 13.6 % (ref 11.5–14.5)
ERYTHROCYTE [DISTWIDTH] IN BLOOD BY AUTOMATED COUNT: 13.7 % (ref 11.5–14.5)
GLUCOSE BLD MANUAL STRIP-MCNC: 100 MG/DL (ref 74–99)
GLUCOSE BLD MANUAL STRIP-MCNC: 127 MG/DL (ref 74–99)
GLUCOSE BLD MANUAL STRIP-MCNC: 138 MG/DL (ref 74–99)
GLUCOSE BLD MANUAL STRIP-MCNC: 29 MG/DL (ref 74–99)
GLUCOSE BLD MANUAL STRIP-MCNC: 78 MG/DL (ref 74–99)
GLUCOSE SERPL-MCNC: 27 MG/DL (ref 74–99)
HCT VFR BLD AUTO: 20.1 % (ref 36–46)
HCT VFR BLD AUTO: 29.4 % (ref 36–46)
HGB BLD-MCNC: 6.2 G/DL (ref 12–16)
HGB BLD-MCNC: 9.4 G/DL (ref 12–16)
IMM GRANULOCYTES # BLD AUTO: 0.02 X10*3/UL (ref 0–0.7)
IMM GRANULOCYTES NFR BLD AUTO: 0.2 % (ref 0–0.9)
LYMPHOCYTES # BLD AUTO: 3.13 X10*3/UL (ref 1.2–4.8)
LYMPHOCYTES NFR BLD AUTO: 38.1 %
MAGNESIUM SERPL-MCNC: 1.94 MG/DL (ref 1.6–2.4)
MCH RBC QN AUTO: 30.1 PG (ref 26–34)
MCH RBC QN AUTO: 31.2 PG (ref 26–34)
MCHC RBC AUTO-ENTMCNC: 30.8 G/DL (ref 32–36)
MCHC RBC AUTO-ENTMCNC: 32 G/DL (ref 32–36)
MCV RBC AUTO: 98 FL (ref 80–100)
MCV RBC AUTO: 98 FL (ref 80–100)
MONOCYTES # BLD AUTO: 0.93 X10*3/UL (ref 0.1–1)
MONOCYTES NFR BLD AUTO: 11.3 %
MRSA DNA SPEC QL NAA+PROBE: NOT DETECTED
NEUTROPHILS # BLD AUTO: 3.73 X10*3/UL (ref 1.2–7.7)
NEUTROPHILS NFR BLD AUTO: 45.4 %
NRBC BLD-RTO: 0 /100 WBCS (ref 0–0)
NRBC BLD-RTO: 0 /100 WBCS (ref 0–0)
PHOSPHATE SERPL-MCNC: 4.3 MG/DL (ref 2.5–4.9)
PLATELET # BLD AUTO: 454 X10*3/UL (ref 150–450)
PLATELET # BLD AUTO: 516 X10*3/UL (ref 150–450)
POTASSIUM SERPL-SCNC: 3.7 MMOL/L (ref 3.5–5.3)
RBC # BLD AUTO: 2.06 X10*6/UL (ref 4–5.2)
RBC # BLD AUTO: 3.01 X10*6/UL (ref 4–5.2)
RH FACTOR (ANTIGEN D): NORMAL
SODIUM SERPL-SCNC: 144 MMOL/L (ref 136–145)
VANCOMYCIN TROUGH SERPL-MCNC: 16.4 UG/ML (ref 5–20)
WBC # BLD AUTO: 5.5 X10*3/UL (ref 4.4–11.3)
WBC # BLD AUTO: 8.2 X10*3/UL (ref 4.4–11.3)

## 2024-02-04 PROCEDURE — 2500000001 HC RX 250 WO HCPCS SELF ADMINISTERED DRUGS (ALT 637 FOR MEDICARE OP)

## 2024-02-04 PROCEDURE — 99222 1ST HOSP IP/OBS MODERATE 55: CPT | Performed by: STUDENT IN AN ORGANIZED HEALTH CARE EDUCATION/TRAINING PROGRAM

## 2024-02-04 PROCEDURE — 85025 COMPLETE CBC W/AUTO DIFF WBC: CPT

## 2024-02-04 PROCEDURE — 2500000004 HC RX 250 GENERAL PHARMACY W/ HCPCS (ALT 636 FOR OP/ED): Performed by: STUDENT IN AN ORGANIZED HEALTH CARE EDUCATION/TRAINING PROGRAM

## 2024-02-04 PROCEDURE — 2060000001 HC INTERMEDIATE ICU ROOM DAILY

## 2024-02-04 PROCEDURE — 36415 COLL VENOUS BLD VENIPUNCTURE: CPT | Performed by: STUDENT IN AN ORGANIZED HEALTH CARE EDUCATION/TRAINING PROGRAM

## 2024-02-04 PROCEDURE — 83735 ASSAY OF MAGNESIUM: CPT

## 2024-02-04 PROCEDURE — 2500000004 HC RX 250 GENERAL PHARMACY W/ HCPCS (ALT 636 FOR OP/ED)

## 2024-02-04 PROCEDURE — 86901 BLOOD TYPING SEROLOGIC RH(D): CPT | Performed by: STUDENT IN AN ORGANIZED HEALTH CARE EDUCATION/TRAINING PROGRAM

## 2024-02-04 PROCEDURE — 85027 COMPLETE CBC AUTOMATED: CPT | Performed by: STUDENT IN AN ORGANIZED HEALTH CARE EDUCATION/TRAINING PROGRAM

## 2024-02-04 PROCEDURE — 80202 ASSAY OF VANCOMYCIN: CPT

## 2024-02-04 PROCEDURE — 99233 SBSQ HOSP IP/OBS HIGH 50: CPT | Performed by: INTERNAL MEDICINE

## 2024-02-04 PROCEDURE — 2500000001 HC RX 250 WO HCPCS SELF ADMINISTERED DRUGS (ALT 637 FOR MEDICARE OP): Performed by: EMERGENCY MEDICINE

## 2024-02-04 PROCEDURE — 2500000005 HC RX 250 GENERAL PHARMACY W/O HCPCS

## 2024-02-04 PROCEDURE — 84100 ASSAY OF PHOSPHORUS: CPT

## 2024-02-04 PROCEDURE — 82947 ASSAY GLUCOSE BLOOD QUANT: CPT

## 2024-02-04 RX ORDER — INSULIN GLARGINE 100 [IU]/ML
10 INJECTION, SOLUTION SUBCUTANEOUS NIGHTLY
Status: DISCONTINUED | OUTPATIENT
Start: 2024-02-04 | End: 2024-02-05

## 2024-02-04 RX ORDER — VANCOMYCIN HYDROCHLORIDE 500 MG/100ML
500 INJECTION, SOLUTION INTRAVENOUS EVERY 12 HOURS
Status: DISCONTINUED | OUTPATIENT
Start: 2024-02-04 | End: 2024-02-05

## 2024-02-04 RX ORDER — CEFTRIAXONE 2 G/50ML
2 INJECTION, SOLUTION INTRAVENOUS EVERY 24 HOURS
Status: DISCONTINUED | OUTPATIENT
Start: 2024-02-04 | End: 2024-02-10 | Stop reason: HOSPADM

## 2024-02-04 RX ADMIN — AMLODIPINE BESYLATE 10 MG: 10 TABLET ORAL at 09:41

## 2024-02-04 RX ADMIN — PIPERACILLIN SODIUM AND TAZOBACTAM SODIUM 4.5 G: 4; .5 INJECTION, SOLUTION INTRAVENOUS at 13:07

## 2024-02-04 RX ADMIN — NITROFURANTOIN 50 MG: 50 CAPSULE ORAL at 06:26

## 2024-02-04 RX ADMIN — ATORVASTATIN CALCIUM 40 MG: 40 TABLET, FILM COATED ORAL at 09:41

## 2024-02-04 RX ADMIN — ENOXAPARIN SODIUM 40 MG: 100 INJECTION SUBCUTANEOUS at 21:00

## 2024-02-04 RX ADMIN — Medication 400 MG: at 09:41

## 2024-02-04 RX ADMIN — NITROFURANTOIN 50 MG: 50 CAPSULE ORAL at 00:48

## 2024-02-04 RX ADMIN — ASPIRIN 81 MG 81 MG: 81 TABLET ORAL at 06:58

## 2024-02-04 RX ADMIN — NYSTATIN 1 APPLICATION: 100000 POWDER TOPICAL at 09:40

## 2024-02-04 RX ADMIN — PIPERACILLIN SODIUM AND TAZOBACTAM SODIUM 4.5 G: 4; .5 INJECTION, SOLUTION INTRAVENOUS at 00:48

## 2024-02-04 RX ADMIN — ENOXAPARIN SODIUM 40 MG: 100 INJECTION SUBCUTANEOUS at 09:40

## 2024-02-04 RX ADMIN — Medication 2 L/MIN: at 16:45

## 2024-02-04 RX ADMIN — VANCOMYCIN HYDROCHLORIDE 750 MG: 750 INJECTION, SOLUTION INTRAVENOUS at 10:34

## 2024-02-04 RX ADMIN — DEXTROSE MONOHYDRATE 25 G: 25 INJECTION, SOLUTION INTRAVENOUS at 07:45

## 2024-02-04 RX ADMIN — Medication 2000 UNITS: at 06:58

## 2024-02-04 RX ADMIN — NYSTATIN 1 APPLICATION: 100000 POWDER TOPICAL at 21:01

## 2024-02-04 RX ADMIN — CLONIDINE HYDROCHLORIDE 0.1 MG: 0.1 TABLET ORAL at 09:41

## 2024-02-04 RX ADMIN — NITROFURANTOIN 50 MG: 50 CAPSULE ORAL at 13:07

## 2024-02-04 RX ADMIN — CLONIDINE HYDROCHLORIDE 0.1 MG: 0.1 TABLET ORAL at 21:02

## 2024-02-04 RX ADMIN — NITROFURANTOIN 50 MG: 50 CAPSULE ORAL at 18:11

## 2024-02-04 RX ADMIN — CEFTRIAXONE SODIUM 2 G: 2 INJECTION, SOLUTION INTRAVENOUS at 21:01

## 2024-02-04 RX ADMIN — PIPERACILLIN SODIUM AND TAZOBACTAM SODIUM 4.5 G: 4; .5 INJECTION, SOLUTION INTRAVENOUS at 06:58

## 2024-02-04 ASSESSMENT — COGNITIVE AND FUNCTIONAL STATUS - GENERAL
STANDING UP FROM CHAIR USING ARMS: A LITTLE
TOILETING: A LITTLE
DAILY ACTIVITIY SCORE: 23
MOBILITY SCORE: 19
WALKING IN HOSPITAL ROOM: A LITTLE
CLIMB 3 TO 5 STEPS WITH RAILING: A LOT
MOVING TO AND FROM BED TO CHAIR: A LITTLE

## 2024-02-04 ASSESSMENT — PAIN - FUNCTIONAL ASSESSMENT: PAIN_FUNCTIONAL_ASSESSMENT: 0-10

## 2024-02-04 ASSESSMENT — PAIN SCALES - GENERAL
PAINLEVEL_OUTOF10: 0 - NO PAIN

## 2024-02-04 NOTE — CONSULTS
Inpatient consult to Infectious Diseases  Consult performed by: Alfredo Pagan MD  Consult ordered by: Austin Nance MD        Primary MD: Mone Aquino MD    Reason For Consult  Bacteremia + R 1st toe OM on CTX    History Of Present Illness  Nuris Squires is a 58 y.o. female with a PMH of T2DM c/b neuropathy, hx of L 1st partial and 2nd toe amputations 2/2 foot infection, recent R 1st toes OM s/p partial hallux amputation (1/12, OR Cx positive for GBS and mixed gram positive) and on IV ceftriaxone (plan for 6 weeks until 2/21 per OSH ID), recent episode of suspected PNA  w/ 4L NC O2 requirements (treated with azithromycin and continue ceftriaxone- admission 1/25-1/29), asymptomatic VRE bacteriuria, HTN, and CKD stage III who is admitted after a syncopal episode on 2/2. Patient was found unresponsive by her boyfriend. Patient reported that prior episode she was feeling dizzy and lightheaded, and she tried to sit down on her couch. However, then she did not remember anything until she woke up on EMS. EMS found patient with Glu of 70 and gave narcan and glucose with some improvement.     On arrival, patient was HDS. Lab work up did not show any leukocytosis (WBC 4.3) but noted increase Cr 1.52. Also, it was recorded a temperature of 31.2 C rectally by ED note, but improved rapidly. Patient was broaden to vancomycin and zosyn from ceftriaxone. Also, nitrofurantoin was added.     Currently, patient endorse that she does not have any fever, chills, night sweats, worsening of R foot pain or drainage noted. She endorses that since last admission (discharge 1/29) she has remained home and compliant with IV Ceftriaxone.       Past Medical History  She has a past medical history of Personal history of other diseases of the circulatory system, Personal history of other endocrine, nutritional and metabolic disease, and Personal history of other endocrine, nutritional and metabolic disease.    Surgical  History  She has a past surgical history that includes Other surgical history (10/21/2020); CT angio head w and wo IV contrast (2023); and CT angio neck (2023).     Social History     Occupational History    Not on file   Tobacco Use    Smoking status: Never     Passive exposure: Never    Smokeless tobacco: Never   Substance and Sexual Activity    Alcohol use: Never    Drug use: Never    Sexual activity: Not on file     Travel History   Travel since 23    No documented travel since 23            Family History  Family History   Problem Relation Name Age of Onset    Alzheimer's disease Mother      Diabetes Mother      Lung cancer Mother      Diabetes Father      Lung cancer Father      Pancreatic cancer Father      Diabetes Sister      Lung cancer Sister       Allergies  Patient has no known allergies.     Immunization History   Administered Date(s) Administered    Pfizer Purple Cap SARS-CoV-2 2021    SARS-CoV-2, Unspecified 2021     Medications  Home medications:  Medications Prior to Admission   Medication Sig Dispense Refill Last Dose    acetaminophen (Tylenol) 325 mg tablet Take 2 tablets (650 mg) by mouth every 4 hours if needed for mild pain (1 - 3).   Past Week    [] alteplase (Cathflo Activase) 2 mg injection 2 mL (2 mg) by intra-catheter route 1 time for 1 dose. 2 mL 0     amLODIPine (Norvasc) 10 mg tablet Take 1 tablet (10 mg) by mouth once daily. 30 tablet 0 2024    aspirin 81 mg chewable tablet Chew 1 tablet (81 mg) once daily.   2024    atorvastatin (Lipitor) 40 mg tablet Take 1 tablet (40 mg) by mouth once daily. 90 tablet 0 2024    blood sugar diagnostic strip 1 strip 4 times a day. 120 strip 0 2024    blood-glucose sensor (FreeStyle Remington 3 Sensor) device Use to monitor blood glucose. Change sensor every 14 days. 2 each 11 2024    cefTRIAXone (Rocephin) 2 gram/50 mL IV Infuse 50 mL (2 g) at 100 mL/hr over 30 minutes into a venous catheter  once every 24 hours for 37 doses. 1500 mL 1 2/2/2024    cefTRIAXone (Rocephin) 2 gram Infuse 2 g into a venous catheter once daily for 21 days. 42 g 0 2/2/2024    cholecalciferol (Vitamin D-3) 50 mcg (2,000 unit) capsule Take 1 capsule (50 mcg) by mouth once daily.   2/2/2024    cloNIDine (Catapres) 0.1 mg tablet Take 1 tablet (0.1 mg) by mouth 2 times a day. 60 tablet 0 2/2/2024    FreeStyle Remington 3 [place] misc Use as instructed to monitor blood glucose. 1 each 0 2/2/2024    insulin glargine (Lantus U-100 Insulin) 100 unit/mL injection Inject 70 Units under the skin once daily at bedtime. Take as directed per insulin instructions.   Past Week    insulin glargine (Lantus) 100 unit/mL injection Inject 70 units nightly. 20 mL 2 Past Week    insulin lispro (HumaLOG U-100 Insulin) 100 unit/mL injection Inject 10 units (0.1mL) with meals 3 times per day 10 mL 1 2/2/2024    insulin lispro (HumaLOG) 100 unit/mL injection Inject 0.1 mL (10 Units) under the skin 3 times a day with meals.   2/2/2024    magnesium oxide (Mag-Ox) 400 mg (241.3 mg magnesium) tablet Take 1 tablet (400 mg) by mouth once daily. Do not start before January 19, 2024.   Past Week    meclizine (Antivert) 25 mg tablet,chewable Chew 1 tablet (25 mg) 3 times a day as needed (vertigo). 90 tablet 1 Past Month    metFORMIN (Glucophage) 500 mg tablet Take 1 tablet (500 mg) by mouth 2 times a day with meals.   2/2/2024    OneTouch Delica Plus Lancet 30 gauge misc USE TO TEST BLOOD SUGAR AS DIRECTED   2/2/2024    OneTouch Ultra2 Meter misc use 1 to 2 times daily   2/2/2024    OneTouch Verio test strips strip USE TO CHECK BLOOD SUGAR AS DIRECTED   2/2/2024    oxygen (O2) gas therapy Inhale 1 each once every 24 hours.   2/2/2024     Current medications:  Scheduled medications  amLODIPine, 10 mg, oral, Daily  aspirin, 81 mg, oral, Daily  atorvastatin, 40 mg, oral, Daily  cholecalciferol, 2,000 Units, oral, Daily  cloNIDine, 0.1 mg, oral, BID  enoxaparin, 40 mg,  subcutaneous, q12h NEHEMIAS  insulin glargine, 10 Units, subcutaneous, Nightly  insulin lispro, 10 Units, subcutaneous, TID with meals  magnesium oxide, 400 mg, oral, Daily  nitrofurantoin, 50 mg, oral, q6h NEHEMIAS  nystatin, 1 Application, Topical, BID  oxygen, , inhalation, q24h  piperacillin-tazobactam, 4.5 g, intravenous, Once  piperacillin-tazobactam, 4.5 g, intravenous, q6h  vancomycin, 500 mg, intravenous, q12h      Continuous medications     PRN medications  PRN medications: albuterol, dextrose 10 % in water (D10W), dextrose, glucagon, meclizine, oxygen    Review of Systems   All other systems reviewed and are negative.       Objective  Range of Vitals (last 24 hours)  Heart Rate:  [64-92]   Temp:  [36 °C (96.8 °F)-37.3 °C (99.1 °F)]   Resp:  [16-22]   BP: (122-166)/(63-90)   SpO2:  [89 %-98 %]   Daily Weight  02/02/24 : 102 kg (225 lb)    Body mass index is 41.15 kg/m².     Physical Exam  Constitutional:       General: She is not in acute distress.  HENT:      Head: Normocephalic.   Cardiovascular:      Rate and Rhythm: Normal rate and regular rhythm.      Pulses: Normal pulses.      Heart sounds: Normal heart sounds. No murmur heard.     No gallop.   Pulmonary:      Effort: Pulmonary effort is normal. No respiratory distress.      Breath sounds: Normal breath sounds. No wheezing, rhonchi or rales.   Abdominal:      General: Abdomen is flat. Bowel sounds are normal. There is no distension.      Palpations: Abdomen is soft.      Tenderness: There is no abdominal tenderness.   Musculoskeletal:         General: No tenderness.      Comments: R foot with 1st hallux partial amputation w/ noted scab over surgical incision with minimal serous fluid drain. No TTP or erythema L foot with 1st partial and 2nd toe amputated   Skin:     General: Skin is warm and dry.      Coloration: Skin is not jaundiced.   Neurological:      General: No focal deficit present.      Mental Status: She is alert and oriented to person, place, and  "time.          Relevant Results    Labs  Results from last 72 hours   Lab Units 02/04/24  0819 02/04/24  0555 02/03/24  0122   WBC AUTO x10*3/uL 5.5 8.2 4.3*   HEMOGLOBIN g/dL 9.4* 6.2* 10.3*   HEMATOCRIT % 29.4* 20.1* 31.1*   PLATELETS AUTO x10*3/uL 454* 516* 482*   NEUTROS PCT AUTO %  --  45.4 76.9   LYMPHS PCT AUTO %  --  38.1 18.6   MONOS PCT AUTO %  --  11.3 3.5   EOS PCT AUTO %  --  4.1 0.0     Results from last 72 hours   Lab Units 02/04/24  0555 02/03/24  0122   SODIUM mmol/L 144 147*   POTASSIUM mmol/L 3.7 4.6   CHLORIDE mmol/L 107 107   CO2 mmol/L 29 26   BUN mg/dL 20 19   CREATININE mg/dL 1.52* 0.89   GLUCOSE mg/dL 27* 94   CALCIUM mg/dL 9.0 9.6   ANION GAP mmol/L 12 19   EGFR mL/min/1.73m*2 39* 75   PHOSPHORUS mg/dL 4.3  --      Results from last 72 hours   Lab Units 02/04/24  0555 02/03/24  0122   ALK PHOS U/L  --  91   BILIRUBIN TOTAL mg/dL  --  0.4   PROTEIN TOTAL g/dL  --  7.1   ALT U/L  --  22   AST U/L  --  21   ALBUMIN g/dL 3.1* 3.4     Estimated Creatinine Clearance: 44.6 mL/min (A) (by C-G formula based on SCr of 1.52 mg/dL (H)).  C-Reactive Protein   Date Value Ref Range Status   02/03/2024 1.86 (H) <1.00 mg/dL Final   01/23/2024 0.21 <1.00 mg/dL Final   01/09/2024 12.50 (H) 0.00 - 2.00 mg/dL Final     Sedimentation Rate   Date Value Ref Range Status   01/23/2024 59 (H) 0 - 30 mm/h Final   01/09/2024 54 (H) 0 - 30 mm/h Final   10/09/2020 60 (H) 0 - 30 mm/h Final     No results found for: \"HIV1X2\", \"HIVCONF\", \"BHBRBG3XC\"  No results found for: \"HEPCABINIT\", \"HEPCAB\", \"HCVPCRQUANT\"  Imaging  === Results for orders placed during the hospital encounter of 02/02/24 ===    XR chest 1 view [[id number]] 02/02/2024    Status: Normal  Cardiomegaly with increased interstitial markings bilaterally and left  basilar airspace disease with question of a left effusion.  CHF is a  consideration however left lower lobe pneumonia is suspected in the  appropriate clinical setting.  Signed by Jhon TOBAR" Maximino        Microbiology  Susceptibility data from last 90 days.  Collected Specimen Info Organism Ampicillin Ciprofloxacin Clindamycin Daptomycin Erythromycin Levofloxacin Linezolid Nitrofurantoin Oxacillin Penicillin Tetracycline Trimethoprim/Sulfamethoxazole Vancomycin   02/03/24 Blood culture from Peripheral Venipuncture Coagulase negative staphylococcus                01/29/24 Urine from Clean Catch/Voided Enterococcus faecium R R  SDD  R S S  R  R R   01/12/24 Tissue from DIGIT FIRST, RIGHT FOOT Streptococcus agalactiae (Group B Streptococcus)                  Mixed Gram-Positive Bacteria                 01/10/24 Tissue/Biopsy from Diabetic Foot Ulcer Methicillin Susceptible Staphylococcus aureus (MSSA)   R  R    S  S S S     Candida glabrata                  Mixed Gram-Positive Bacteria                 01/09/24 Urine from Clean Catch/Voided Streptococcus agalactiae (Group B Streptococcus)                  1/9 Bcx NG   1/10 R foot cx: MSSA, mixed gram positive and +2 Candida glabrata.  1/12 OR R 1st digit cx: GBS and rare +1 mixed gram positive organism  1/29 Ucx 20-80K CFU/ mL VRE  2/3 Bcx 1 set w 1/2 bottle CoNS and other set 2/2 GPC in clusters  2/3 MRSA screening Not detected      Assessment/Plan   Nursi Squires is a 58 y.o. female with a PMH of T2DM c/b neuropathy, hx of L 1st partial and 2nd toe amputations 2/2 foot infection, recent R 1st toes OM s/p partial hallux amputation (1/12, OR Cx positive for GBS and mixed gram positive) and on IV ceftriaxone (plan for 6 weeks until 2/21 per OSH ID), recent episode of suspected PNA  w/ 4L NC O2 requirements (treated with azithromycin and continue ceftriaxone- admission 1/25-1/29), asymptomatic VRE bacteriuria, HTN, and CKD stage III who is admitted after a syncopal episode on 2/2. However, patient has recorded T of 31.2 C. Now, Bcx positive for GPC in clusters/CoNS. Primary team concern for sepsis and VRE UTI. On vancomycin, zosyn and nitrofurantoin. ID  consulted for further assessment    ID problems  #Recent R 1st toe OM s/p partial amputation (1/12)- Plan Ceftriaxone until 2/21  #Asymptomatic bacteriuria  #Positive Bcx: True bacteremia vs contaminant  #Suspected PNA-treated recently  Patient has new episode of syncope and recorded T of 31.2 C. Bcx now positive for GPC in clusters, but both recorded draw at same time. Patient does not have any urinary symptoms, fever, chills and R foot pain or drainage. Suspected that Bcx could be related to skin contaminant, but will need further identification. On the other hand Ucx represent most likely a colonized in the settings of lack or urinary symptoms. From ID standpoint, we will recommend continue treatment for OM in settings of hardware noted and in close proximity. For now, can keep vancomycin for suspected bacteremia.     Recommendations  -Continue vancomycin for goal trough 15-20 or -600. Dosing and monitoring by Pharmacist  -Recommend to switch Zosyn to Ceftriaxone 2 gr daily  -Recommend to repeat 2 sets of Bcx  -Recommend to discontinue nitrofurantoin. If pursue treatment, recommend 3 day course.  -Patient will follow up with OSH ID (Dr Hickey) at discharge.    ID will follow up  Plan discussed with Dr Win Pagan MD

## 2024-02-04 NOTE — PROGRESS NOTES
Subjective   Nuris Squires is a 58 y.o. female on hospital day 1 who had episode of hypoglycemia this morning.  Patient states she ate dinner yesterday but did not get her mealtime coverage.  She also states she was indeed taking 70 units of Lantus at home and was given decreased dose last night and still dropped her sugars this morning.  Patient has not had any syncopal episodes.      Objective     Exam     Vitals:    02/04/24 0700 02/04/24 0855 02/04/24 0856 02/04/24 1100   BP: 163/85 154/82 165/84 131/81   BP Location:       Patient Position: Lying Sitting Standing    Pulse: 64 66 82 70   Resp: 16 20 22 16   Temp: 36 °C (96.8 °F)      TempSrc:       SpO2: 92% (!) 89% 90% 95%   Weight:       Height:          Intake/Output last 3 shifts:  I/O last 3 completed shifts:  In: 750 (7.3 mL/kg) [IV Piggyback:750]  Out: 1000 (9.8 mL/kg) [Urine:1000 (0.3 mL/kg/hr)]  Weight: 102.1 kg     Physical Exam  Vitals reviewed.   Constitutional:       Comments: Very pleasant   HENT:      Head: Normocephalic and atraumatic.      Mouth/Throat:      Mouth: Mucous membranes are dry.   Neck:      Vascular: No carotid bruit.   Cardiovascular:      Rate and Rhythm: Normal rate and regular rhythm.      Pulses: Normal pulses.      Heart sounds: No murmur heard.     No friction rub. No gallop.   Pulmonary:      Effort: Pulmonary effort is normal.      Breath sounds: Normal breath sounds. No wheezing, rhonchi or rales.   Abdominal:      General: Bowel sounds are normal. There is no distension.      Palpations: Abdomen is soft.      Tenderness: There is no abdominal tenderness. There is no guarding or rebound.   Musculoskeletal:      Cervical back: Neck supple.      Right lower leg: No edema.      Left lower leg: No edema.      Comments: Right great toe amputation with no significant erythema or tenderness or active drainage expressible from incision site.  No fluctuant sites   Skin:     General: Skin is warm and dry.      Comments: Candidal  rash under pannus and in groin region without signs of further cellulitis   Neurological:      General: No focal deficit present.      Mental Status: She is alert and oriented to person, place, and time.   Psychiatric:         Mood and Affect: Mood normal.         Behavior: Behavior normal.            Medications   amLODIPine, 10 mg, oral, Daily  aspirin, 81 mg, oral, Daily  atorvastatin, 40 mg, oral, Daily  cholecalciferol, 2,000 Units, oral, Daily  cloNIDine, 0.1 mg, oral, BID  enoxaparin, 40 mg, subcutaneous, q12h NEHEMIAS  insulin glargine, 10 Units, subcutaneous, Nightly  insulin lispro, 10 Units, subcutaneous, TID with meals  magnesium oxide, 400 mg, oral, Daily  nitrofurantoin, 50 mg, oral, q6h NEHEMIAS  nystatin, 1 Application, Topical, BID  oxygen, , inhalation, q24h  piperacillin-tazobactam, 4.5 g, intravenous, Once  piperacillin-tazobactam, 4.5 g, intravenous, q6h  vancomycin, 500 mg, intravenous, q12h       PRN medications: albuterol, dextrose 10 % in water (D10W), dextrose, glucagon, meclizine, oxygen       Labs     All new labs reviewed:  some of the basic labs as follows -     Results from last 7 days   Lab Units 02/04/24  0819 02/04/24  0555 02/03/24  0122 01/31/24  0923   WBC AUTO x10*3/uL 5.5 8.2 4.3* 5.1   HEMOGLOBIN g/dL 9.4* 6.2* 10.3* 8.7*   HEMATOCRIT % 29.4* 20.1* 31.1* 29.8*   PLATELETS AUTO x10*3/uL 454* 516* 482* 442   NEUTROS PCT AUTO %  --  45.4 76.9 42.8   LYMPHS PCT AUTO %  --  38.1 18.6 41.1   MONOS PCT AUTO %  --  11.3 3.5 10.0   EOS PCT AUTO %  --  4.1 0.0 5.1            Results from last 72 hours   Lab Units 02/04/24  0555 02/03/24  0122   SODIUM mmol/L 144 147*   POTASSIUM mmol/L 3.7 4.6   CHLORIDE mmol/L 107 107   CO2 mmol/L 29 26   BUN mg/dL 20 19   CREATININE mg/dL 1.52* 0.89       Results from last 72 hours   Lab Units 02/04/24  0555 02/03/24  0122   ALK PHOS U/L  --  91   AST U/L  --  21   ALT U/L  --  22   BILIRUBIN TOTAL mg/dL  --  0.4   ALBUMIN g/dL 3.1* 3.4   PROTEIN TOTAL g/dL  --  " 7.1       Results from last 72 hours   Lab Units 02/04/24  0555 02/03/24  0122   GLUCOSE mg/dL 27* 94       Results from last 72 hours   Lab Units 02/03/24  0800   LEUKOCYTES U  SMALL (1+)*   NITRITE U  NEGATIVE   WBC UR /HPF 6-10*   RBC UR HPF /HPF 3-5   BLOOD UR  NEGATIVE       No results found for: \"TR1\"  Lab Results   Component Value Date    URINECULTURE 20,000 - 80,000 Enterococcus faecium (A) 01/29/2024    BLOODCULT Staphylococcus epidermidis (AA) 02/03/2024    BLOODCULT Staphylococcus epidermidis (AA) 02/03/2024    BLOODCULT No growth at 4 days -  FINAL REPORT 01/25/2024    BLOODCULT No growth at 4 days -  FINAL REPORT 01/25/2024            Imaging   ECG 12 Lead  Normal sinus rhythm  Possible Anterior infarct (cited on or before 15-ISAK-2024)  Abnormal ECG  When compared with ECG of 24-JAN-2024 20:35,  Premature supraventricular complexes are no longer Present  QRS axis Shifted left  Confirmed by Abhinav Wallace ([id number]) on 2/3/2024 11:09:00 PM  CT head wo IV contrast, CT cervical spine wo IV contrast  Narrative: Interpreted By:  Cheyanne Herrera and Dervishi Mario   STUDY:  CT HEAD WO IV CONTRAST; CT CERVICAL SPINE WO IV CONTRAST;  2/3/2024  12:24 am      INDICATION:  Signs/Symptoms:Unwitnessed fall, altered mental status      COMPARISON:  CT head: 06/27/2023, CT C-spine 01/25/2023      ACCESSION NUMBER(S):  [id number]; [id number]      ORDERING CLINICIAN:  CHEYANNE REYES      TECHNIQUE:  Axial noncontrast CT images of head with coronal and sagittal  reconstructed images. Axial noncontrast CT images of the cervical  spine with coronal and sagittal reconstructed images.      FINDINGS:  CT HEAD:      BRAIN PARENCHYMA: Unchanged calcification of the dentate nuclei and  globus pallidi. No acute intraparenchymal hemorrhage or parenchymal  evidence of acute large territory ischemic infarct. No mass-effect.  Gray-white matter distinction is preserved.      VENTRICLES and EXTRA-AXIAL SPACES:  No acute extra-axial " or  intraventricular hemorrhage. No effacement of cerebral sulci.  Ventricles and sulci are age-concordant.      PARANASAL SINUSES/MASTOIDS:  No hemorrhage or air-fluid levels within  the visualized paranasal sinuses. The mastoids are well aerated.      CALVARIUM/ORBITS:  No skull fracture.  The orbits and globes are  intact to the extent visualized.      EXTRACRANIAL SOFT TISSUES: No discernible abnormality.          CT CERVICAL SPINE:      PREVERTEBRAL SOFT TISSUES: Within normal limits.      CRANIOCERVICAL JUNCTION: Intact.      ALIGNMENT:  No traumatic malalignment or traumatic facet widening.      VERTEBRAE: Vertebral body heights are maintained. No acute fracture.  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  Thick ossification of the anterior longitudinal ligament and bridging  osteophytes at C3-C4 through C6-C7 compatible with DISH.      SPINAL CANAL/INTERVERTEBRAL DISCS: No high-grade spinal canal  stenosis. No significant disc height loss.      NEURAL FORAMINA: Multilevel facet and uncovertebral joint arthropathy  result in mild right C4-C5 foraminal stenosis but no high-grade  foraminal stenosis.      OTHER: None.      Impression: CT HEAD:  1. No acute intracranial abnormality or calvarial fracture.      CT CERVICAL SPINE:  1. No acute fracture or traumatic malalignment of the cervical spine.  2. Redemonstration of DISH without significant canal or foraminal  stenosis.  3. Redemonstration of chronic ununited fracture of the C6 spinous  process.      I personally reviewed the images/study and I agree with the findings  as stated by Resident Yovany Tolliver MD. This study was interpreted  at [place], [place].      MACRO:  None.      Signed by: Mike Herrera 2/3/2024 1:56 AM  Dictation workstation:   [id number]  XR chest 1 view  Narrative: STUDY:  Chest Radiograph;  2/2/2024 11:56 PM  INDICATION:  Fall and altered mental  status.  COMPARISON:  1/30/2024 XR Chest two view  ACCESSION NUMBER(S):  [id number]  ORDERING CLINICIAN:  Mike Nino  TECHNIQUE:  Frontal chest was obtained at 23:56 hours.  FINDINGS:  CARDIOMEDIASTINAL SILHOUETTE:  Cardiomediastinal silhouette is enlarged in size and configuration.   Right IJ central venous catheter is seen with the tip in the upper  SVC.     LUNGS:  Increased interstitial markings are seen bilaterally with airspace  disease at the left lung base and along left cardiac border.  Left  pleural effusion may be present.     ABDOMEN:  No remarkable upper abdominal findings.     BONES:  No acute osseous changes.  Impression: Cardiomegaly with increased interstitial markings bilaterally and left  basilar airspace disease with question of a left effusion.  CHF is a  consideration however left lower lobe pneumonia is suspected in the  appropriate clinical setting.  Signed by Jhon Stanton     No results found for this or any previous visit from the past 1095 days.     Encounter Date: 01/24/24   ECG 12 Lead   Result Value    Ventricular Rate 77    Atrial Rate 77    WA Interval 134    QRS Duration 78    QT Interval 396    QTC Calculation(Bazett) 448    P Axis 39    R Axis 26    T Axis 19    QRS Count 12    Q Onset 222    P Onset 155    P Offset 215    T Offset 420    QTC Fredericia 430    Narrative    Normal sinus rhythm  Possible Anterior infarct (cited on or before 15-ISAK-2024)  Abnormal ECG  When compared with ECG of 24-JAN-2024 20:35,  Premature supraventricular complexes are no longer Present  QRS axis Shifted left  Confirmed by Abhinav Wallace ([id number]) on 2/3/2024 11:09:00 PM      EKG: Poor baseline although appears to be sinus rhythm without any obvious acute ischemic changes    Assessment and Plan     Syncope: Possible orthostatic hypotension in the setting of poor p.o. intake and resolving diarrhea versus related to hypoglycemia versus sepsis in the setting of the hypothermia/leukopenia now with 4 out  of 4 positive blood cultures with possible sources including her PICC line as well as her toe.    -Check orthostatic.  Can bolus IV fluids if positive  -Continue broad-spectrum antibiotics at this time.  Given possible  complaints we had added nitrofurantoin to cover recent Enterococcus faecium until he found alternative explanation.  With positive blood cultures we may be able to stop the nitrofurantoin.  can place hold on ceftriaxone since using broad-spectrum antibiotics.  We have asked infectious disease service to see patient.  Will look to have tunneled PICC line removed from chest wall by IR  -Follow-up blood cultures.  Sending off repeat set today  -Overall toe appears relatively unremarkable.  At this time we will hold off on any advanced imaging such as MRI and podiatry evaluation.  -Follow-up ESR    Cardiovascular:  -Continue clonidine and amlodipine  -Continue statin and aspirin  -Holding home lisinopril    Diabetes mellitus: Patient symptomatic under 200 per her account.  Of note patient had multiple blood glucoses in the 70s prior to recent discharge.  Patient did drop significantly this morning.  Not clear if this is related to uncontrolled infection.  -Drop Lantus dose to 10 units for now we will titrate per sliding scale insulin needs  -Holding lispro with meals  -Continue sliding scale  -Hold metformin at this time    Pulmonary:  -Incentive spirometer 10 times an hour while awake  -Wean O2    Recent acute renal failure: Creatinine had improved although saw spike back up to 1.5.  Is possible yesterday's 0.9 measure was erroneously low.  -Monitor renal function panel    Anemia: Repeat hemoglobin this morning improved without intervention.  Appear to be lab error  -Monitor CBC  -Monitor for gross blood losses including in stools    DVT prophylaxis    Physical therapy/Occupational Therapy when appropriate    Austin Nance MD     Of note the above was done with Dragon dictation system.  Note was  proofread to minimize errors.

## 2024-02-04 NOTE — PROGRESS NOTES
"Vancomycin Dosing by Pharmacy- FOLLOW UP    Nuris Squires is a 58 y.o. year old female who Pharmacy has been consulted for vancomycin dosing for pneumonia. Based on the patient's indication and renal status this patient is being dosed based on a goal AUC of 400-600.     Renal function is currently stable.    Current vancomycin dose: 750 mg given every 12 hours    Estimated vancomycin AUC on current dose: >700 mg/L.hr     Visit Vitals  /84 (Patient Position: Standing)   Pulse 82   Temp 36 °C (96.8 °F)   Resp 22        Lab Results   Component Value Date    CREATININE 1.52 (H) 02/04/2024    CREATININE 0.89 02/03/2024    CREATININE 1.55 (H) 01/31/2024    CREATININE 1.78 (H) 01/30/2024        Patient weight is No results found for: \"PTWEIGHT\"    No results found for: \"CULTURE\"     I/O last 3 completed shifts:  In: 750 (7.3 mL/kg) [IV Piggyback:750]  Out: 1000 (9.8 mL/kg) [Urine:1000 (0.3 mL/kg/hr)]  Weight: 102.1 kg   [unfilled]    Lab Results   Component Value Date    PATIENTTEMP 37.0 02/03/2024    PATIENTTEMP 37.0 01/24/2024        Assessment/Plan    Above goal AUC. Orders placed for new vancomcyin regimen of 500 every 12 hours to begin at 2100.    This dosing regimen is predicted by [employer] to result in the following pharmacokinetic parameters:  AUC24,ss: 540 mg/L.hr  Probability of AUC24 > 400: 83 %  Ctrough,ss: 19.6 mg/L  Probability of Ctrough,ss > 20: 48 %  Probability of nephrotoxicity (Lodise CHARLENE 2009): 17 %    The next level will be obtained on 2/5 at AM labs. May be obtained sooner if clinically indicated.   Will continue to monitor renal function daily while on vancomycin and order serum creatinine at least every 48 hours if not already ordered.  Follow for continued vancomycin needs, clinical response, and signs/symptoms of toxicity.       Margareth Devine, PharmD           "

## 2024-02-04 NOTE — CARE PLAN
The patient's goals for the shift include Feel better    The clinical goals for the shift include Pt. will remain injury free.    Over the shift, the patient made progress towards all goals.

## 2024-02-04 NOTE — PROGRESS NOTES
"Nuris Squires is a 58 y.o. female on day 1 of admission presenting with Syncope, unspecified syncope type.    Subjective   Patient interviewed and examined this AM. Denies acute pain or problems overnight, but noted to be signficantly hypoglycemic (20's) on AM chemistry. Patient reportedly somewhat confused at this time and BG confirmed to be in 20's with fingerstick. Patient received half an amp of D50 and BG improved to 130's and patient felt symptomatically better. Denies current HA, CP, SOB, Abd pain, N/V/D.       Objective     Physical Exam  Gen: Middle aged female, resting in bed comfortably, NAD  HEENT: EOMI, no scleral icterus  CV: RRR, no murmurs, rubs, or gallops  Lungs: CTAB, no wheezes, rales, or rhonchi  Abd: Soft, non-tender, non-distended  Ext: s/p amputation of 2 toes on L foot and partial amputation on R foot, no obvious purulence  Neuro: A&O x4, spontaneously moving all extremities   Psych: Appropriate mood and behavior    Last Recorded Vitals  Blood pressure 131/81, pulse 70, temperature 36 °C (96.8 °F), resp. rate 16, height 1.575 m (5' 2\"), weight 102 kg (225 lb), SpO2 95 %.  Intake/Output last 3 Shifts:  I/O last 3 completed shifts:  In: 750 (7.3 mL/kg) [IV Piggyback:750]  Out: 1000 (9.8 mL/kg) [Urine:1000 (0.3 mL/kg/hr)]  Weight: 102.1 kg     Relevant Results           Scheduled medications  amLODIPine, 10 mg, oral, Daily  aspirin, 81 mg, oral, Daily  atorvastatin, 40 mg, oral, Daily  cholecalciferol, 2,000 Units, oral, Daily  cloNIDine, 0.1 mg, oral, BID  enoxaparin, 40 mg, subcutaneous, q12h NEHEMIAS  insulin glargine, 10 Units, subcutaneous, Nightly  insulin lispro, 10 Units, subcutaneous, TID with meals  magnesium oxide, 400 mg, oral, Daily  nitrofurantoin, 50 mg, oral, q6h NEHEMIAS  nystatin, 1 Application, Topical, BID  oxygen, , inhalation, q24h  piperacillin-tazobactam, 4.5 g, intravenous, Once  piperacillin-tazobactam, 4.5 g, intravenous, q6h  vancomycin, 500 mg, intravenous, " q12h      Continuous medications     PRN medications  PRN medications: albuterol, dextrose 10 % in water (D10W), dextrose, glucagon, meclizine, oxygen    Results for orders placed or performed during the hospital encounter of 02/02/24 (from the past 24 hour(s))   POCT GLUCOSE   Result Value Ref Range    POCT Glucose 101 (H) 74 - 99 mg/dL   POCT GLUCOSE   Result Value Ref Range    POCT Glucose 155 (H) 74 - 99 mg/dL   MRSA Surveillance for Vancomycin De-escalation, PCR    Specimen: Anterior Nares; Swab   Result Value Ref Range    MRSA PCR Not Detected Not Detected   CBC and Auto Differential   Result Value Ref Range    WBC 8.2 4.4 - 11.3 x10*3/uL    nRBC 0.0 0.0 - 0.0 /100 WBCs    RBC 2.06 (L) 4.00 - 5.20 x10*6/uL    Hemoglobin 6.2 (LL) 12.0 - 16.0 g/dL    Hematocrit 20.1 (L) 36.0 - 46.0 %    MCV 98 80 - 100 fL    MCH 30.1 26.0 - 34.0 pg    MCHC 30.8 (L) 32.0 - 36.0 g/dL    RDW 13.6 11.5 - 14.5 %    Platelets 516 (H) 150 - 450 x10*3/uL    Neutrophils % 45.4 40.0 - 80.0 %    Immature Granulocytes %, Automated 0.2 0.0 - 0.9 %    Lymphocytes % 38.1 13.0 - 44.0 %    Monocytes % 11.3 2.0 - 10.0 %    Eosinophils % 4.1 0.0 - 6.0 %    Basophils % 0.9 0.0 - 2.0 %    Neutrophils Absolute 3.73 1.20 - 7.70 x10*3/uL    Immature Granulocytes Absolute, Automated 0.02 0.00 - 0.70 x10*3/uL    Lymphocytes Absolute 3.13 1.20 - 4.80 x10*3/uL    Monocytes Absolute 0.93 0.10 - 1.00 x10*3/uL    Eosinophils Absolute 0.34 0.00 - 0.70 x10*3/uL    Basophils Absolute 0.07 0.00 - 0.10 x10*3/uL   Renal function panel   Result Value Ref Range    Glucose 27 (LL) 74 - 99 mg/dL    Sodium 144 136 - 145 mmol/L    Potassium 3.7 3.5 - 5.3 mmol/L    Chloride 107 98 - 107 mmol/L    Bicarbonate 29 21 - 32 mmol/L    Anion Gap 12 10 - 20 mmol/L    Urea Nitrogen 20 6 - 23 mg/dL    Creatinine 1.52 (H) 0.50 - 1.05 mg/dL    eGFR 39 (L) >60 mL/min/1.73m*2    Calcium 9.0 8.6 - 10.6 mg/dL    Phosphorus 4.3 2.5 - 4.9 mg/dL    Albumin 3.1 (L) 3.4 - 5.0 g/dL   Magnesium    Result Value Ref Range    Magnesium 1.94 1.60 - 2.40 mg/dL   Vancomycin, Trough   Result Value Ref Range    Vancomycin, Trough 16.4 5.0 - 20.0 ug/mL   POCT GLUCOSE   Result Value Ref Range    POCT Glucose 29 (L) 74 - 99 mg/dL   POCT GLUCOSE   Result Value Ref Range    POCT Glucose 138 (H) 74 - 99 mg/dL   CBC   Result Value Ref Range    WBC 5.5 4.4 - 11.3 x10*3/uL    nRBC 0.0 0.0 - 0.0 /100 WBCs    RBC 3.01 (L) 4.00 - 5.20 x10*6/uL    Hemoglobin 9.4 (L) 12.0 - 16.0 g/dL    Hematocrit 29.4 (L) 36.0 - 46.0 %    MCV 98 80 - 100 fL    MCH 31.2 26.0 - 34.0 pg    MCHC 32.0 32.0 - 36.0 g/dL    RDW 13.7 11.5 - 14.5 %    Platelets 454 (H) 150 - 450 x10*3/uL   Type and screen   Result Value Ref Range    ABO TYPE A     Rh TYPE POS     ANTIBODY SCREEN NEG    POCT GLUCOSE   Result Value Ref Range    POCT Glucose 100 (H) 74 - 99 mg/dL      CT head wo IV contrast    Result Date: 2/3/2024  Interpreted By:  Cheyanne Herrera and Dervishi Mario STUDY: CT HEAD WO IV CONTRAST; CT CERVICAL SPINE WO IV CONTRAST;  2/3/2024 12:24 am   INDICATION: Signs/Symptoms:Unwitnessed fall, altered mental status   COMPARISON: CT head: 06/27/2023, CT C-spine 01/25/2023   ACCESSION NUMBER(S): [id number]; [id number]   ORDERING CLINICIAN: CHEYANNE REYES   TECHNIQUE: Axial noncontrast CT images of head with coronal and sagittal reconstructed images. Axial noncontrast CT images of the cervical spine with coronal and sagittal reconstructed images.   FINDINGS: CT HEAD:   BRAIN PARENCHYMA: Unchanged calcification of the dentate nuclei and globus pallidi. No acute intraparenchymal hemorrhage or parenchymal evidence of acute large territory ischemic infarct. No mass-effect. Gray-white matter distinction is preserved.   VENTRICLES and EXTRA-AXIAL SPACES:  No acute extra-axial or intraventricular hemorrhage. No effacement of cerebral sulci. Ventricles and sulci are age-concordant.   PARANASAL SINUSES/MASTOIDS:  No hemorrhage or air-fluid levels within the  visualized paranasal sinuses. The mastoids are well aerated.   CALVARIUM/ORBITS:  No skull fracture.  The orbits and globes are intact to the extent visualized.   EXTRACRANIAL SOFT TISSUES: No discernible abnormality.     CT CERVICAL SPINE:   PREVERTEBRAL SOFT TISSUES: Within normal limits.   CRANIOCERVICAL JUNCTION: Intact.   ALIGNMENT:  No traumatic malalignment or traumatic facet widening.   VERTEBRAE: Vertebral body heights are maintained. No acute fracture. 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 Thick ossification of the anterior longitudinal ligament and bridging osteophytes at C3-C4 through C6-C7 compatible with DISH.   SPINAL CANAL/INTERVERTEBRAL DISCS: No high-grade spinal canal stenosis. No significant disc height loss.   NEURAL FORAMINA: Multilevel facet and uncovertebral joint arthropathy result in mild right C4-C5 foraminal stenosis but no high-grade foraminal stenosis.   OTHER: None.       CT HEAD: 1. No acute intracranial abnormality or calvarial fracture.   CT CERVICAL SPINE: 1. No acute fracture or traumatic malalignment of the cervical spine. 2. Redemonstration of DISH without significant canal or foraminal stenosis. 3. Redemonstration of chronic ununited fracture of the C6 spinous process.   I personally reviewed the images/study and I agree with the findings as stated by Resident Yovany Tolliver MD. This study was interpreted at [place], [place].   MACRO: None.   Signed by: Mike Herrera 2/3/2024 1:56 AM Dictation workstation:   [id number]    CT cervical spine wo IV contrast    Result Date: 2/3/2024  Interpreted By:  Mike Herrera and Dervishi Mario STUDY: CT HEAD WO IV CONTRAST; CT CERVICAL SPINE WO IV CONTRAST;  2/3/2024 12:24 am   INDICATION: Signs/Symptoms:Unwitnessed fall, altered mental status   COMPARISON: CT head: 06/27/2023, CT C-spine 01/25/2023   ACCESSION NUMBER(S): [id number]; [id number]    ORDERING CLINICIAN: CHEYANNE REYES   TECHNIQUE: Axial noncontrast CT images of head with coronal and sagittal reconstructed images. Axial noncontrast CT images of the cervical spine with coronal and sagittal reconstructed images.   FINDINGS: CT HEAD:   BRAIN PARENCHYMA: Unchanged calcification of the dentate nuclei and globus pallidi. No acute intraparenchymal hemorrhage or parenchymal evidence of acute large territory ischemic infarct. No mass-effect. Gray-white matter distinction is preserved.   VENTRICLES and EXTRA-AXIAL SPACES:  No acute extra-axial or intraventricular hemorrhage. No effacement of cerebral sulci. Ventricles and sulci are age-concordant.   PARANASAL SINUSES/MASTOIDS:  No hemorrhage or air-fluid levels within the visualized paranasal sinuses. The mastoids are well aerated.   CALVARIUM/ORBITS:  No skull fracture.  The orbits and globes are intact to the extent visualized.   EXTRACRANIAL SOFT TISSUES: No discernible abnormality.     CT CERVICAL SPINE:   PREVERTEBRAL SOFT TISSUES: Within normal limits.   CRANIOCERVICAL JUNCTION: Intact.   ALIGNMENT:  No traumatic malalignment or traumatic facet widening.   VERTEBRAE: Vertebral body heights are maintained. No acute fracture. 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 Thick ossification of the anterior longitudinal ligament and bridging osteophytes at C3-C4 through C6-C7 compatible with DISH.   SPINAL CANAL/INTERVERTEBRAL DISCS: No high-grade spinal canal stenosis. No significant disc height loss.   NEURAL FORAMINA: Multilevel facet and uncovertebral joint arthropathy result in mild right C4-C5 foraminal stenosis but no high-grade foraminal stenosis.   OTHER: None.       CT HEAD: 1. No acute intracranial abnormality or calvarial fracture.   CT CERVICAL SPINE: 1. No acute fracture or traumatic malalignment of the cervical spine. 2. Redemonstration of DISH without significant canal or foraminal stenosis. 3. Redemonstration of chronic  ununited fracture of the C6 spinous process.   I personally reviewed the images/study and I agree with the findings as stated by Resident Yovany Tolliver MD. This study was interpreted at [facility], [place], [place].   MACRO: None.   Signed by: Mike Herrera 2/3/2024 1:56 AM Dictation workstation:   [id number]    XR chest 1 view    Result Date: 2/3/2024  STUDY: Chest Radiograph;  2/2/2024 11:56 PM INDICATION: Fall and altered mental status. COMPARISON: 1/30/2024 XR Chest two view ACCESSION NUMBER(S): [id number] ORDERING CLINICIAN: Mike Nino TECHNIQUE:  Frontal chest was obtained at 23:56 hours. FINDINGS: CARDIOMEDIASTINAL SILHOUETTE: Cardiomediastinal silhouette is enlarged in size and configuration. Right IJ central venous catheter is seen with the tip in the upper SVC.  LUNGS: Increased interstitial markings are seen bilaterally with airspace disease at the left lung base and along left cardiac border.  Left pleural effusion may be present.  ABDOMEN: No remarkable upper abdominal findings.  BONES: No acute osseous changes.    Cardiomegaly with increased interstitial markings bilaterally and left basilar airspace disease with question of a left effusion.  CHF is a consideration however left lower lobe pneumonia is suspected in the appropriate clinical setting. Signed by Jhon Stanton                    Assessment/Plan   Principal Problem:    Syncope, unspecified syncope type    Nuris Squires is a 58 y.o. female with PMHx T2DM  on home insulin (very poor control, Hgb A1c 13.` 01/2024) c/b neuropathy, HTN, CKD stage III, multiple foot infections s/p recent partial R big toe amputation in which she grew Group B strep and was on  6 week course of ceftriaxone via tunneled line (Serena catheter placed 01/15/24, CTX to be continued through 02/21/24), recent admission for PNA on 4L home O2 that presented on 02/02/24 with altered mental status and syncope. Patient noted to have  hypoglycemia and was found to be significantly hypothermic on admission. Of note, patient has had recurrent hypoglycemia since admission and admission blood cultures (4/4) now growing coagulase negative staph, suggesting that bacteremia may be leading to recurrent hypoglycemia. Potential sources include central line infection, secondary infection of toe, or potentially urine (patient notably grew VRE in urine culture on 01/31, but this was thought to be an asymptomatic colonization and was never treated).     #Syncope  #Coagulase Negative Staph bacteremia  #Hypothermia, resolved  ::Echo during last admission 01/25 shows LVEF 70-75%, mildly increased LV septal wall thickness, otherwise unremarkable  ::Culture data: Blood cultures x2 02/03/24, Coagulase negative staph 4/4 bottles  -ID consulted, recs pending  -Repeat blood cultures  -Continue IV vancomycin and zosyn (02/03- )  -Continue nitrofurantoin (02/03- )       #T2DM c/b neuropathy, uncontrolled c/b neuropathy  #Recurrent hypoglycemia  ::on home lantus 70 units, prandial lispro 10 units TID  ::A1c 13.1% on 01/24/2024  ::Got 50 units Lantus on evening of 2/3 with AM BG 2/4 in the 20's  ::Suspect bacteremia is driving hypoglycemia  - Decrease Lantus to 10 units nightly, prandial lispro 10 units TID, SSI  -hold metformin  -consider endocrinology consult if still having difficulty controlling BG in AM     #amputation of 2 toes on left foot  #recent R big toe partial amputation, on   ::Had R big toe partial amputation on 01/12 with podiatry for source control of infection  ::Intra-op culture grew rare Group B streptococcus  ::R chest tunneled line placed 01/15 for 6 weeks of IV ceftriaxone to end 02/21/2024  -ID consulted, appreciate recs  -hold IV ceftriaxone while receiving nitrofurantoin and IV Vanc, zosyn   -CTM wound for drainage/appearance of infection     #Recent PNA   #prior flash pulmonary edema  ::discharged home on 4L O2 on 01/31/2024  -incentive  spirometer   -Wean O2 as tolerated     #HTN  -Continue home clonidine and amlodipine  -Continue home aspirin 81 mg (Can discuss stopping with patient as this appears to be for primary prevention)    #DLD  -continue atorvastatin 40 mg     #Hx of CKD Stage III  #Recent VENKAT   ::Creatinine at time of discharge from last admission 01/31 was 1.55  -Daily RFP  -Renally dose medications     F: PRN  E: PRN  N: Diabetic  A: PIV, tunneled line (placed 01/15/24, likely needs to be removed)     DVT prophylaxis: Lovenox BID     Code status: Full code (confirmed on admission)             Rubio Caruso MD

## 2024-02-04 NOTE — CARE PLAN
The patient's goals for the shift include Feel better    The clinical goals for the shift include Pt. will remain free from falls for duration of shift.

## 2024-02-04 NOTE — PROGRESS NOTES
"Vancomycin Dosing by Pharmacy- INITIAL    Nuris Squires is a 58 y.o. year old female who Pharmacy has been consulted for vancomycin dosing for pneumonia/UTI. Based on the patient's indication and renal status this patient will be dosed based on a goal AUC of 400-600.     Renal function is currently stable.    Visit Vitals  /86 (Patient Position: Standing)   Pulse 92   Temp 36.6 °C (97.9 °F)   Resp 17        Lab Results   Component Value Date    CREATININE 0.89 02/03/2024    CREATININE 1.55 (H) 01/31/2024    CREATININE 1.78 (H) 01/30/2024    CREATININE 1.93 (H) 01/29/2024        Patient weight is No results found for: \"PTWEIGHT\"    No results found for: \"CULTURE\"     I/O last 3 completed shifts:  In: 100 (1 mL/kg) [IV Piggyback:100]  Out: - (0 mL/kg)   Weight: 102.1 kg   [unfilled]    Lab Results   Component Value Date    PATIENTTEMP 37.0 02/03/2024    PATIENTTEMP 37.0 01/24/2024          Assessment/Plan     Patient will be given a loading dose of 1500 mg.  Will initiate vancomycin maintenance, a loading dose of 1500 mg followed by a dose of 750 mg 12 hours later.    This dosing regimen is predicted by [employer] to result in the following pharmacokinetic parameters:    Loading dose: N/A  Regimen: 750 mg IV every 12 hours.  Start time: 18:31 on 02/03/2024  Exposure target: AUC24 (range)400-600 mg/L.hr   AUC24,ss: 515 mg/L.hr  Probability of AUC24 > 400: 69 %  Ctrough,ss: 16.4 mg/L  Probability of Ctrough,ss > 20: 38 %  Probability of nephrotoxicity (Lodise CHARLENE 2009): 12 %      Follow-up level will be ordered on 02/04 at 0500 unless clinically indicated sooner.  Will continue to monitor renal function daily while on vancomycin and order serum creatinine at least every 48 hours if not already ordered.  Follow for continued vancomycin needs, clinical response, and signs/symptoms of toxicity.       Deandre Gonzalez, PharmD       "

## 2024-02-05 ENCOUNTER — HOME CARE VISIT (OUTPATIENT)
Dept: HOME HEALTH SERVICES | Facility: HOME HEALTH | Age: 59
End: 2024-02-05
Payer: COMMERCIAL

## 2024-02-05 LAB
ALBUMIN SERPL BCP-MCNC: 2.8 G/DL (ref 3.4–5)
ANION GAP SERPL CALC-SCNC: 12 MMOL/L (ref 10–20)
BACTERIA BLD AEROBE CULT: ABNORMAL
BACTERIA BLD CULT: ABNORMAL
BACTERIA UR CULT: NORMAL
BASOPHILS # BLD AUTO: 0.06 X10*3/UL (ref 0–0.1)
BASOPHILS NFR BLD AUTO: 1.1 %
BUN SERPL-MCNC: 19 MG/DL (ref 6–23)
CALCIUM SERPL-MCNC: 8.8 MG/DL (ref 8.6–10.6)
CHLORIDE SERPL-SCNC: 107 MMOL/L (ref 98–107)
CO2 SERPL-SCNC: 29 MMOL/L (ref 21–32)
CREAT SERPL-MCNC: 1.76 MG/DL (ref 0.5–1.05)
CRP SERPL-MCNC: 1.29 MG/DL
EGFRCR SERPLBLD CKD-EPI 2021: 33 ML/MIN/1.73M*2
EOSINOPHIL # BLD AUTO: 0.19 X10*3/UL (ref 0–0.7)
EOSINOPHIL NFR BLD AUTO: 3.5 %
ERYTHROCYTE [DISTWIDTH] IN BLOOD BY AUTOMATED COUNT: 13.4 % (ref 11.5–14.5)
ERYTHROCYTE [SEDIMENTATION RATE] IN BLOOD BY WESTERGREN METHOD: 43 MM/H (ref 0–30)
GLUCOSE BLD MANUAL STRIP-MCNC: 118 MG/DL (ref 74–99)
GLUCOSE BLD MANUAL STRIP-MCNC: 150 MG/DL (ref 74–99)
GLUCOSE BLD MANUAL STRIP-MCNC: 224 MG/DL (ref 74–99)
GLUCOSE BLD MANUAL STRIP-MCNC: 234 MG/DL (ref 74–99)
GLUCOSE BLD MANUAL STRIP-MCNC: 44 MG/DL (ref 74–99)
GLUCOSE BLD MANUAL STRIP-MCNC: 77 MG/DL (ref 74–99)
GLUCOSE SERPL-MCNC: 71 MG/DL (ref 74–99)
GRAM STN SPEC: ABNORMAL
GRAM STN SPEC: ABNORMAL
HCT VFR BLD AUTO: 23.8 % (ref 36–46)
HGB BLD-MCNC: 8 G/DL (ref 12–16)
IMM GRANULOCYTES # BLD AUTO: 0.01 X10*3/UL (ref 0–0.7)
IMM GRANULOCYTES NFR BLD AUTO: 0.2 % (ref 0–0.9)
LYMPHOCYTES # BLD AUTO: 2 X10*3/UL (ref 1.2–4.8)
LYMPHOCYTES NFR BLD AUTO: 37 %
MAGNESIUM SERPL-MCNC: 2 MG/DL (ref 1.6–2.4)
MCH RBC QN AUTO: 31 PG (ref 26–34)
MCHC RBC AUTO-ENTMCNC: 33.6 G/DL (ref 32–36)
MCV RBC AUTO: 92 FL (ref 80–100)
MONOCYTES # BLD AUTO: 0.71 X10*3/UL (ref 0.1–1)
MONOCYTES NFR BLD AUTO: 13.1 %
NEUTROPHILS # BLD AUTO: 2.44 X10*3/UL (ref 1.2–7.7)
NEUTROPHILS NFR BLD AUTO: 45.1 %
NRBC BLD-RTO: 0 /100 WBCS (ref 0–0)
PHOSPHATE SERPL-MCNC: 4.6 MG/DL (ref 2.5–4.9)
PLATELET # BLD AUTO: 391 X10*3/UL (ref 150–450)
POTASSIUM SERPL-SCNC: 3.8 MMOL/L (ref 3.5–5.3)
RBC # BLD AUTO: 2.58 X10*6/UL (ref 4–5.2)
SODIUM SERPL-SCNC: 144 MMOL/L (ref 136–145)
VANCOMYCIN SERPL-MCNC: 21.1 UG/ML (ref 5–20)
VANCOMYCIN TROUGH SERPL-MCNC: 18.5 UG/ML (ref 5–20)
WBC # BLD AUTO: 5.4 X10*3/UL (ref 4.4–11.3)

## 2024-02-05 PROCEDURE — 99233 SBSQ HOSP IP/OBS HIGH 50: CPT | Performed by: INTERNAL MEDICINE

## 2024-02-05 PROCEDURE — 2060000001 HC INTERMEDIATE ICU ROOM DAILY

## 2024-02-05 PROCEDURE — 80202 ASSAY OF VANCOMYCIN: CPT | Performed by: INTERNAL MEDICINE

## 2024-02-05 PROCEDURE — 80202 ASSAY OF VANCOMYCIN: CPT

## 2024-02-05 PROCEDURE — 2500000001 HC RX 250 WO HCPCS SELF ADMINISTERED DRUGS (ALT 637 FOR MEDICARE OP)

## 2024-02-05 PROCEDURE — 2500000004 HC RX 250 GENERAL PHARMACY W/ HCPCS (ALT 636 FOR OP/ED): Performed by: INTERNAL MEDICINE

## 2024-02-05 PROCEDURE — 86140 C-REACTIVE PROTEIN: CPT | Performed by: STUDENT IN AN ORGANIZED HEALTH CARE EDUCATION/TRAINING PROGRAM

## 2024-02-05 PROCEDURE — 2500000004 HC RX 250 GENERAL PHARMACY W/ HCPCS (ALT 636 FOR OP/ED)

## 2024-02-05 PROCEDURE — 2500000004 HC RX 250 GENERAL PHARMACY W/ HCPCS (ALT 636 FOR OP/ED): Performed by: PHARMACY TECHNICIAN

## 2024-02-05 PROCEDURE — 2500000002 HC RX 250 W HCPCS SELF ADMINISTERED DRUGS (ALT 637 FOR MEDICARE OP, ALT 636 FOR OP/ED)

## 2024-02-05 PROCEDURE — 97161 PT EVAL LOW COMPLEX 20 MIN: CPT | Mod: GP

## 2024-02-05 PROCEDURE — 2500000004 HC RX 250 GENERAL PHARMACY W/ HCPCS (ALT 636 FOR OP/ED): Performed by: STUDENT IN AN ORGANIZED HEALTH CARE EDUCATION/TRAINING PROGRAM

## 2024-02-05 PROCEDURE — 82947 ASSAY GLUCOSE BLOOD QUANT: CPT

## 2024-02-05 PROCEDURE — 83735 ASSAY OF MAGNESIUM: CPT

## 2024-02-05 PROCEDURE — 36415 COLL VENOUS BLD VENIPUNCTURE: CPT | Performed by: STUDENT IN AN ORGANIZED HEALTH CARE EDUCATION/TRAINING PROGRAM

## 2024-02-05 PROCEDURE — 97165 OT EVAL LOW COMPLEX 30 MIN: CPT | Mod: GO | Performed by: OCCUPATIONAL THERAPIST

## 2024-02-05 PROCEDURE — 80069 RENAL FUNCTION PANEL: CPT

## 2024-02-05 PROCEDURE — 85025 COMPLETE CBC W/AUTO DIFF WBC: CPT

## 2024-02-05 PROCEDURE — 85652 RBC SED RATE AUTOMATED: CPT | Performed by: STUDENT IN AN ORGANIZED HEALTH CARE EDUCATION/TRAINING PROGRAM

## 2024-02-05 PROCEDURE — 87040 BLOOD CULTURE FOR BACTERIA: CPT | Performed by: STUDENT IN AN ORGANIZED HEALTH CARE EDUCATION/TRAINING PROGRAM

## 2024-02-05 RX ORDER — INSULIN LISPRO 100 [IU]/ML
0-5 INJECTION, SOLUTION INTRAVENOUS; SUBCUTANEOUS
Status: DISCONTINUED | OUTPATIENT
Start: 2024-02-05 | End: 2024-02-10 | Stop reason: HOSPADM

## 2024-02-05 RX ORDER — VANCOMYCIN HYDROCHLORIDE 500 MG/100ML
500 INJECTION, SOLUTION INTRAVENOUS EVERY 12 HOURS
Status: COMPLETED | OUTPATIENT
Start: 2024-02-05 | End: 2024-02-05

## 2024-02-05 RX ORDER — VANCOMYCIN HYDROCHLORIDE 1 G/20ML
INJECTION, POWDER, LYOPHILIZED, FOR SOLUTION INTRAVENOUS DAILY PRN
Status: DISCONTINUED | OUTPATIENT
Start: 2024-02-05 | End: 2024-02-06

## 2024-02-05 RX ORDER — POTASSIUM CHLORIDE 20 MEQ/1
20 TABLET, EXTENDED RELEASE ORAL ONCE
Status: COMPLETED | OUTPATIENT
Start: 2024-02-05 | End: 2024-02-05

## 2024-02-05 RX ORDER — AMOXICILLIN 250 MG
2 CAPSULE ORAL 2 TIMES DAILY
Status: DISCONTINUED | OUTPATIENT
Start: 2024-02-05 | End: 2024-02-10 | Stop reason: HOSPADM

## 2024-02-05 RX ADMIN — ENOXAPARIN SODIUM 40 MG: 100 INJECTION SUBCUTANEOUS at 08:36

## 2024-02-05 RX ADMIN — Medication 2000 UNITS: at 06:08

## 2024-02-05 RX ADMIN — ENOXAPARIN SODIUM 40 MG: 100 INJECTION SUBCUTANEOUS at 20:49

## 2024-02-05 RX ADMIN — NITROFURANTOIN 50 MG: 50 CAPSULE ORAL at 02:22

## 2024-02-05 RX ADMIN — NITROFURANTOIN 50 MG: 50 CAPSULE ORAL at 06:08

## 2024-02-05 RX ADMIN — VANCOMYCIN HYDROCHLORIDE 500 MG: 500 INJECTION, SOLUTION INTRAVENOUS at 20:49

## 2024-02-05 RX ADMIN — ASPIRIN 81 MG 81 MG: 81 TABLET ORAL at 06:08

## 2024-02-05 RX ADMIN — NYSTATIN 1 APPLICATION: 100000 POWDER TOPICAL at 08:38

## 2024-02-05 RX ADMIN — POTASSIUM CHLORIDE 20 MEQ: 1500 TABLET, EXTENDED RELEASE ORAL at 08:36

## 2024-02-05 RX ADMIN — CEFTRIAXONE SODIUM 2 G: 2 INJECTION, SOLUTION INTRAVENOUS at 18:00

## 2024-02-05 RX ADMIN — NYSTATIN 1 APPLICATION: 100000 POWDER TOPICAL at 20:49

## 2024-02-05 RX ADMIN — INSULIN LISPRO 2 UNITS: 100 INJECTION, SOLUTION INTRAVENOUS; SUBCUTANEOUS at 18:00

## 2024-02-05 RX ADMIN — VANCOMYCIN HYDROCHLORIDE 500 MG: 500 INJECTION, SOLUTION INTRAVENOUS at 00:47

## 2024-02-05 RX ADMIN — CLONIDINE HYDROCHLORIDE 0.1 MG: 0.1 TABLET ORAL at 20:49

## 2024-02-05 RX ADMIN — CLONIDINE HYDROCHLORIDE 0.1 MG: 0.1 TABLET ORAL at 08:36

## 2024-02-05 RX ADMIN — Medication 400 MG: at 08:36

## 2024-02-05 RX ADMIN — AMLODIPINE BESYLATE 10 MG: 10 TABLET ORAL at 08:36

## 2024-02-05 RX ADMIN — VANCOMYCIN HYDROCHLORIDE 500 MG: 500 INJECTION, SOLUTION INTRAVENOUS at 11:42

## 2024-02-05 RX ADMIN — ATORVASTATIN CALCIUM 40 MG: 40 TABLET, FILM COATED ORAL at 08:36

## 2024-02-05 ASSESSMENT — PAIN SCALES - GENERAL
PAINLEVEL_OUTOF10: 0 - NO PAIN

## 2024-02-05 ASSESSMENT — PAIN - FUNCTIONAL ASSESSMENT
PAIN_FUNCTIONAL_ASSESSMENT: 0-10

## 2024-02-05 ASSESSMENT — COGNITIVE AND FUNCTIONAL STATUS - GENERAL
DAILY ACTIVITIY SCORE: 17
CLIMB 3 TO 5 STEPS WITH RAILING: A LOT
TURNING FROM BACK TO SIDE WHILE IN FLAT BAD: A LITTLE
DAILY ACTIVITIY SCORE: 17
STANDING UP FROM CHAIR USING ARMS: A LITTLE
PERSONAL GROOMING: A LITTLE
DRESSING REGULAR LOWER BODY CLOTHING: A LOT
MOVING TO AND FROM BED TO CHAIR: A LITTLE
DRESSING REGULAR UPPER BODY CLOTHING: A LITTLE
MOBILITY SCORE: 19
DAILY ACTIVITIY SCORE: 24
DAILY ACTIVITIY SCORE: 24
WALKING IN HOSPITAL ROOM: A LITTLE
MOBILITY SCORE: 19
WALKING IN HOSPITAL ROOM: A LITTLE
WALKING IN HOSPITAL ROOM: A LITTLE
HELP NEEDED FOR BATHING: A LITTLE
PERSONAL GROOMING: A LITTLE
DRESSING REGULAR UPPER BODY CLOTHING: A LITTLE
CLIMB 3 TO 5 STEPS WITH RAILING: A LITTLE
TOILETING: A LOT
WALKING IN HOSPITAL ROOM: A LITTLE
STANDING UP FROM CHAIR USING ARMS: A LITTLE
MOVING FROM LYING ON BACK TO SITTING ON SIDE OF FLAT BED WITH BEDRAILS: A LITTLE
CLIMB 3 TO 5 STEPS WITH RAILING: A LOT
CLIMB 3 TO 5 STEPS WITH RAILING: A LITTLE
STANDING UP FROM CHAIR USING ARMS: A LITTLE
MOVING TO AND FROM BED TO CHAIR: A LITTLE
MOVING FROM LYING ON BACK TO SITTING ON SIDE OF FLAT BED WITH BEDRAILS: A LITTLE
DRESSING REGULAR LOWER BODY CLOTHING: A LOT
MOVING TO AND FROM BED TO CHAIR: A LITTLE
TOILETING: A LOT
HELP NEEDED FOR BATHING: A LITTLE
TURNING FROM BACK TO SIDE WHILE IN FLAT BAD: A LITTLE
MOBILITY SCORE: 18
MOVING TO AND FROM BED TO CHAIR: A LITTLE
STANDING UP FROM CHAIR USING ARMS: A LITTLE
MOBILITY SCORE: 18

## 2024-02-05 ASSESSMENT — ACTIVITIES OF DAILY LIVING (ADL)
LACK_OF_TRANSPORTATION: NO
ADL_ASSISTANCE: INDEPENDENT
ADL_ASSISTANCE: INDEPENDENT

## 2024-02-05 NOTE — PROGRESS NOTES
02/05/24 1458   Discharge Planning   Living Arrangements Spouse/significant other   Support Systems Spouse/significant other   Assistance Needed No   Type of Residence Private residence   Number of Stairs to Enter Residence 5   Number of Stairs Within Residence 0   Do you have animals or pets at home? No   Who is requesting discharge planning? Provider   Home or Post Acute Services None   Patient expects to be discharged to: Home   Does the patient need discharge transport arranged? No   Financial Resource Strain   How hard is it for you to pay for the very basics like food, housing, medical care, and heating? Not hard   Housing Stability   In the last 12 months, how many places have you lived? 1   In the last 12 months, was there a time when you did not have a steady place to sleep or slept in a shelter (including now)? N   Transportation Needs   In the past 12 months, has lack of transportation kept you from medical appointments or from getting medications? no   In the past 12 months, has lack of transportation kept you from meetings, work, or from getting things needed for daily living? No   Patient Choice   Patient / Family choosing to utilize agency / facility established prior to hospitalization No     Assessment Note:  Met with patient and Introduced myself as care coordinator and member of the Care Transitions team for discharge planning. Pt feels safe at home.   Transportation: Patients significant other Brandon will transport patient home when medically ready.  Pharmacy: [facility] on [address]  DME: [facility], patient has oxygen at home usually on 4L, only been on 2L since being at hospital.  Previous home care: No  Falls:Yes  PCP: Mone Barlow-  doctor saw in November.  Address, phone number and emergency contact Information was verified. All questions and concerns answered will continue to follow for discharge needs.    Transitional Care Coordination Progress Note:  Patient discussed during  interdisciplinary rounds.   Team members present: MD and TCC  Plan per Medical/Surgical team: Patient will be on IV antibiotics for Sepsis.  Payor: Jean-Pierre CHAVARRIA  Discharge disposition: Home  Potential Barriers: None  ADOD: 02/07/24

## 2024-02-05 NOTE — NURSING NOTE
Pt called nurse to check sugar and stated that she felt shaky - bg was 44. Gave pt 2x OJ and melanie manrique and will recheck in 15 min

## 2024-02-05 NOTE — PROGRESS NOTES
Vancomycin Dosing by Pharmacy- FOLLOW UP    *Updated note for 2/5/24 based on decline in renal function and switching from AUC to trough based dosing by level*    Nuris Squires is a 58 y.o. year old female who Pharmacy has been consulted for vancomycin dosing for pneumonia - based on discussion with the team and per chart review - indication is now MRSE bacteremia. Based on the patient's indication and renal status this patient is being dosed based on a goal trough/random level of 15-20.    Renal function is currently declining.    Current vancomycin dose: 500 mg given every 12 hours    Most recent trough level: 18.5 mcg/mL (12 hour trough level) - level this morning was peak drawn ~4 hours after dose was given    Visit Vitals  /60   Pulse 66   Temp 36.8 °C (98.2 °F) (Temporal)   Resp 16        Lab Results   Component Value Date    CREATININE 1.76 (H) 02/05/2024    CREATININE 1.52 (H) 02/04/2024    CREATININE 0.89 02/03/2024    CREATININE 1.55 (H) 01/31/2024        MRSA PCR negative  2/3 urine cx NGTD  2/3 blood cx 4/4 with Staph epi - resistant to clindamycin, erythromycin, oxacillin  2/5 blood cx pending     I/O last 3 completed shifts:  In: 800 (7.8 mL/kg) [IV Piggyback:800]  Out: 1700 (16.7 mL/kg) [Urine:1700 (0.5 mL/kg/hr)]  Weight: 102.1 kg       Lab Results   Component Value Date    PATIENTTEMP 37.0 02/03/2024    PATIENTTEMP 37.0 01/24/2024        Assessment/Plan    Within goal random/trough level. Patient received dose of 500mg prior to level resulting. Will plan to keep 500mg Q12H tonight for one more dose and get 24 hour level tomorrow to assess plan based on renal function.  The next level will be obtained on 2/6 at 1100. May be obtained sooner if clinically indicated.   Will continue to monitor renal function daily while on vancomycin and order serum creatinine at least every 48 hours if not already ordered.  Follow for continued vancomycin needs, clinical response, and signs/symptoms of toxicity.        Tatum Lanza, PharmD

## 2024-02-05 NOTE — PROGRESS NOTES
Subjective   Nuris Squires is a 58 y.o. female on hospital day 2 who had another episode of hypoglycemia this morning.  Again patient ate yesterday a total of 3 meals per her account with last 1 being around 4 to 5 PM and did not get any insulin throughout the day.  This is never been an issue for patient in the past.    Objective     Exam     Vitals:    02/04/24 2101 02/05/24 0047 02/05/24 0751 02/05/24 1100   BP: 129/85 122/79 124/67 100/60   BP Location:   Right arm    Patient Position:   Lying    Pulse: 70 67 65 66   Resp:  20 18 16   Temp:  36 °C (96.8 °F) 36.6 °C (97.9 °F) 36.8 °C (98.2 °F)   TempSrc:  Tympanic Skin    SpO2:  94% 96% 94%   Weight:       Height:          Intake/Output last 3 shifts:  I/O last 3 completed shifts:  In: 800 (7.8 mL/kg) [IV Piggyback:800]  Out: 1700 (16.7 mL/kg) [Urine:1700 (0.5 mL/kg/hr)]  Weight: 102.1 kg     Physical Exam  Vitals reviewed.   Constitutional:       Comments: Very pleasant   HENT:      Head: Normocephalic and atraumatic.      Mouth/Throat:      Mouth: Mucous membranes are moist.   Neck:      Vascular: No carotid bruit.   Cardiovascular:      Rate and Rhythm: Normal rate and regular rhythm.      Pulses: Normal pulses.      Heart sounds: No murmur heard.     No friction rub. No gallop.      Comments: Chest wall tunneled PICC unremarkable  Pulmonary:      Effort: Pulmonary effort is normal.      Breath sounds: Normal breath sounds. No wheezing, rhonchi or rales.   Abdominal:      General: Bowel sounds are normal. There is no distension.      Palpations: Abdomen is soft.      Tenderness: There is no abdominal tenderness. There is no guarding or rebound.   Musculoskeletal:      Cervical back: Neck supple.      Right lower leg: No edema.      Left lower leg: No edema.      Comments: Right great toe amputation with no significant erythema or tenderness or active drainage expressible from incision site.  No fluctuant sites   Skin:     General: Skin is warm and dry.       Comments: Candidal rash under pannus and in groin region without signs of further cellulitis   Neurological:      General: No focal deficit present.      Mental Status: She is alert and oriented to person, place, and time.   Psychiatric:         Mood and Affect: Mood normal.         Behavior: Behavior normal.            Medications   amLODIPine, 10 mg, oral, Daily  aspirin, 81 mg, oral, Daily  atorvastatin, 40 mg, oral, Daily  cefTRIAXone, 2 g, intravenous, q24h  cholecalciferol, 2,000 Units, oral, Daily  cloNIDine, 0.1 mg, oral, BID  enoxaparin, 40 mg, subcutaneous, q12h NEHEMIAS  magnesium oxide, 400 mg, oral, Daily  nystatin, 1 Application, Topical, BID  oxygen, , inhalation, q24h  vancomycin, 500 mg, intravenous, q12h       PRN medications: albuterol, dextrose 10 % in water (D10W), dextrose, glucagon, meclizine, oxygen       Labs     All new labs reviewed:  some of the basic labs as follows -     Results from last 7 days   Lab Units 02/05/24 0446 02/04/24 0819 02/04/24 0555 02/03/24  0122   WBC AUTO x10*3/uL 5.4 5.5 8.2 4.3*   HEMOGLOBIN g/dL 8.0* 9.4* 6.2* 10.3*   HEMATOCRIT % 23.8* 29.4* 20.1* 31.1*   PLATELETS AUTO x10*3/uL 391 454* 516* 482*   NEUTROS PCT AUTO % 45.1  --  45.4 76.9   LYMPHS PCT AUTO % 37.0  --  38.1 18.6   MONOS PCT AUTO % 13.1  --  11.3 3.5   EOS PCT AUTO % 3.5  --  4.1 0.0            Results from last 72 hours   Lab Units 02/05/24 0446 02/04/24  0555 02/03/24  0122   SODIUM mmol/L 144 144 147*   POTASSIUM mmol/L 3.8 3.7 4.6   CHLORIDE mmol/L 107 107 107   CO2 mmol/L 29 29 26   BUN mg/dL 19 20 19   CREATININE mg/dL 1.76* 1.52* 0.89       Results from last 72 hours   Lab Units 02/05/24 0446 02/04/24  0555 02/03/24  0122   ALK PHOS U/L  --   --  91   AST U/L  --   --  21   ALT U/L  --   --  22   BILIRUBIN TOTAL mg/dL  --   --  0.4   ALBUMIN g/dL 2.8* 3.1* 3.4   PROTEIN TOTAL g/dL  --   --  7.1       Results from last 72 hours   Lab Units 02/05/24  0446 02/04/24  0555 02/03/24  0122   GLUCOSE  "mg/dL 71* 27* 94       Results from last 72 hours   Lab Units 02/03/24  0800   LEUKOCYTES U  SMALL (1+)*   NITRITE U  NEGATIVE   WBC UR /HPF 6-10*   RBC UR HPF /HPF 3-5   BLOOD UR  NEGATIVE       No results found for: \"TR1\"  Lab Results   Component Value Date    URINECULTURE 20,000 - 80,000 Enterococcus faecium (A) 01/29/2024    BLOODCULT Loaded on Instrument - Culture in progress 02/05/2024    BLOODCULT Loaded on Instrument - Culture in progress 02/05/2024    BLOODCULT Staphylococcus epidermidis (AA) 02/03/2024    BLOODCULT Staphylococcus epidermidis (AA) 02/03/2024            Imaging   ECG 12 Lead  Normal sinus rhythm  Possible Anterior infarct (cited on or before 15-ISAK-2024)  Abnormal ECG  When compared with ECG of 24-JAN-2024 20:35,  Premature supraventricular complexes are no longer Present  QRS axis Shifted left  Confirmed by Abhinav Wallace ([id number]) on 2/3/2024 11:09:00 PM  CT head wo IV contrast, CT cervical spine wo IV contrast  Narrative: Interpreted By:  Cheyanne Herrrea and Dervishi Mario   STUDY:  CT HEAD WO IV CONTRAST; CT CERVICAL SPINE WO IV CONTRAST;  2/3/2024  12:24 am      INDICATION:  Signs/Symptoms:Unwitnessed fall, altered mental status      COMPARISON:  CT head: 06/27/2023, CT C-spine 01/25/2023      ACCESSION NUMBER(S):  [id number]; [id number]      ORDERING CLINICIAN:  CHEYANNE REYES      TECHNIQUE:  Axial noncontrast CT images of head with coronal and sagittal  reconstructed images. Axial noncontrast CT images of the cervical  spine with coronal and sagittal reconstructed images.      FINDINGS:  CT HEAD:      BRAIN PARENCHYMA: Unchanged calcification of the dentate nuclei and  globus pallidi. No acute intraparenchymal hemorrhage or parenchymal  evidence of acute large territory ischemic infarct. No mass-effect.  Gray-white matter distinction is preserved.      VENTRICLES and EXTRA-AXIAL SPACES:  No acute extra-axial or  intraventricular hemorrhage. No effacement of cerebral " sulci.  Ventricles and sulci are age-concordant.      PARANASAL SINUSES/MASTOIDS:  No hemorrhage or air-fluid levels within  the visualized paranasal sinuses. The mastoids are well aerated.      CALVARIUM/ORBITS:  No skull fracture.  The orbits and globes are  intact to the extent visualized.      EXTRACRANIAL SOFT TISSUES: No discernible abnormality.          CT CERVICAL SPINE:      PREVERTEBRAL SOFT TISSUES: Within normal limits.      CRANIOCERVICAL JUNCTION: Intact.      ALIGNMENT:  No traumatic malalignment or traumatic facet widening.      VERTEBRAE: Vertebral body heights are maintained. No acute fracture.  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  Thick ossification of the anterior longitudinal ligament and bridging  osteophytes at C3-C4 through C6-C7 compatible with DISH.      SPINAL CANAL/INTERVERTEBRAL DISCS: No high-grade spinal canal  stenosis. No significant disc height loss.      NEURAL FORAMINA: Multilevel facet and uncovertebral joint arthropathy  result in mild right C4-C5 foraminal stenosis but no high-grade  foraminal stenosis.      OTHER: None.      Impression: CT HEAD:  1. No acute intracranial abnormality or calvarial fracture.      CT CERVICAL SPINE:  1. No acute fracture or traumatic malalignment of the cervical spine.  2. Redemonstration of DISH without significant canal or foraminal  stenosis.  3. Redemonstration of chronic ununited fracture of the C6 spinous  process.      I personally reviewed the images/study and I agree with the findings  as stated by Resident Yovany Tolliver MD. This study was interpreted  at [place], [place].      MACRO:  None.      Signed by: Mike Herrera 2/3/2024 1:56 AM  Dictation workstation:   [id number]  XR chest 1 view  Narrative: STUDY:  Chest Radiograph;  2/2/2024 11:56 PM  INDICATION:  Fall and altered mental status.  COMPARISON:  1/30/2024 XR Chest two view  ACCESSION  NUMBER(S):  [id number]  ORDERING CLINICIAN:  Mike Nino  TECHNIQUE:  Frontal chest was obtained at 23:56 hours.  FINDINGS:  CARDIOMEDIASTINAL SILHOUETTE:  Cardiomediastinal silhouette is enlarged in size and configuration.   Right IJ central venous catheter is seen with the tip in the upper  SVC.     LUNGS:  Increased interstitial markings are seen bilaterally with airspace  disease at the left lung base and along left cardiac border.  Left  pleural effusion may be present.     ABDOMEN:  No remarkable upper abdominal findings.     BONES:  No acute osseous changes.  Impression: Cardiomegaly with increased interstitial markings bilaterally and left  basilar airspace disease with question of a left effusion.  CHF is a  consideration however left lower lobe pneumonia is suspected in the  appropriate clinical setting.  Signed by Jhon Stanton     No results found for this or any previous visit from the past 1095 days.     Encounter Date: 01/24/24   ECG 12 Lead   Result Value    Ventricular Rate 77    Atrial Rate 77    IN Interval 134    QRS Duration 78    QT Interval 396    QTC Calculation(Bazett) 448    P Axis 39    R Axis 26    T Axis 19    QRS Count 12    Q Onset 222    P Onset 155    P Offset 215    T Offset 420    QTC Fredericia 430    Narrative    Normal sinus rhythm  Possible Anterior infarct (cited on or before 15-ISAK-2024)  Abnormal ECG  When compared with ECG of 24-JAN-2024 20:35,  Premature supraventricular complexes are no longer Present  QRS axis Shifted left  Confirmed by Abhinav Wallace ([id number]) on 2/3/2024 11:09:00 PM        Assessment and Plan     Syncope: Possible orthostatic hypotension in the setting of poor p.o. intake and resolving diarrhea versus related to hypoglycemia versus sepsis in the setting of the hypothermia/leukopenia now with 4 out of 4 positive blood cultures with possible sources including her PICC line as well as her toe.  Blood cultures are positive for Staphylococcus epidermidis  apparently of identical susceptibility patterns increasing my concerns that this is indeed infection over contamination  -Check orthostatic.  Can bolus IV fluids if positive  -Continue antibiotics.  Will transition Zosyn back to ceftriaxone and continue vancomycin at this time.  Will also discontinue nitrofurantoin  -Follow-up blood cultures.  Sending off repeat set today  -Overall toe appears relatively unremarkable.  At this time we will hold off on any advanced imaging such as MRI and podiatry evaluation.  -Follow-up further infectious disease recommendations.  Determining fate of tunneled central access that she is using for her ceftriaxone administration as outpatient    Cardiovascular:  -Continue clonidine and amlodipine  -Continue statin and aspirin  -Holding home lisinopril    Diabetes mellitus: Patient with repeat drop in blood glucose spite not getting insulin yesterday.  Possible with worsening renal function that insulin is lingering longer although overall lower suspicion  -For now we will continue holding all Lantus and mealtime lispro  -Continue mild sliding scale  -Hold metformin at this time  -If blood glucose drops again below 50 would ideally draw stat serum glucose, insulin, C-peptide, insulin growth factor and sulfonylurea level as well as consulting endocrinology  -Check a.m. cortisol    Pulmonary:  -Incentive spirometer 10 times an hour while awake  -Wean O2    acute renal failure: Creatinine has continued to rise now up to 1.76.  Overall baseline appears possibly closer to 1.5  -Monitor renal function panel  -Will give gentle IV bolus.  May be further driven by orthostatics as noted above  -Check PVR  -If not improving can check urine electrolytes    Anemia: Repeat hemoglobin this morning improved without intervention.  Appear to be lab error  -Monitor CBC  -Monitor for gross blood losses including in stools    DVT prophylaxis    Physical therapy/Occupational Therapy when appropriate    [place]  JAMES Nance MD     Of note the above was done with Dragon dictation system.  Note was proofread to minimize errors.

## 2024-02-05 NOTE — PROGRESS NOTES
"Vancomycin Dosing by Pharmacy    Nursi Squires is a 58 y.o. year old female who Pharmacy has been consulted for vancomycin dosing for pneumonia. Based on the patient's indication and renal status this patient is being dosed based on a goal AUC of 400-600.     Renal function is currently stable.       Current vancomycin dose: 500 mg given every 12 hours    Estimated vancomycin AUC on current dose: 568 mg/L.hr     Visit Vitals  /79   Pulse 67   Temp 36 °C (96.8 °F) (Tympanic)   Resp 20   Ht 1.575 m (5' 2\")   Wt 102 kg (225 lb)   SpO2 94%   BMI 41.15 kg/m²   Smoking Status Never   BSA 2.11 m²        Lab Results   Component Value Date    CREATININE 1.76 (H) 02/05/2024    CREATININE 1.52 (H) 02/04/2024    CREATININE 0.89 02/03/2024    CREATININE 1.55 (H) 01/31/2024        Lab Results   Component Value Date    VANCORANDOM 21.1 (H) 02/05/2024    VANCORANDOM 10.6 01/10/2024    VANCORANDOM 14.8 08/17/2019    VANCOTROUGH 16.4 02/04/2024    VANCOTROUGH 27.9 (HH) 10/12/2020        I/O last 3 completed shifts:  In: 750 (7.3 mL/kg) [IV Piggyback:750]  Out: 1700 (16.7 mL/kg) [Urine:1700 (0.5 mL/kg/hr)]  Weight: 102.1 kg       Staph/MRSA Screen Culture   Date/Time Value Ref Range Status   01/25/2024 12:34 PM No Staphylococcus aureus isolated  Final     Urine Culture   Date/Time Value Ref Range Status   01/29/2024 10:25 PM 20,000 - 80,000 Enterococcus faecium (A)  Final     Comment:     Vancomycin Resistant Enterococcus (VRE)     Blood Culture   Date/Time Value Ref Range Status   02/03/2024 01:22 AM Staphylococcus epidermidis (AA)  Preliminary   02/03/2024 01:22 AM Staphylococcus epidermidis (AA)  Preliminary     Comment:     Corrected result: Previously reported as Coagulase negative staphylococcus on 2/4/2024 at 0930 EST.     Tissue/Wound Culture/Smear   Date/Time Value Ref Range Status   01/12/2024 01:38 PM No growth aerobically and anaerobically  Final     Gram Stain   Date/Time Value Ref Range Status   02/03/2024 01:22 " AM Gram positive cocci, clusters (AA)  Preliminary     Comment:     Anaerobic Bottle Positive   02/03/2024 01:22 AM Gram positive cocci, clusters (AA)  Preliminary     Comment:     Aerobic Bottle Positive   02/03/2024 01:22 AM Gram positive cocci, clusters (AA)  Preliminary     Comment:     Anaerobic Bottle Positive   02/03/2024 01:22 AM Gram positive cocci, clusters (AA)  Preliminary     Comment:     Aerobic Bottle Positive     C. difficile Toxin, PCR   Date/Time Value Ref Range Status   08/13/2023 06:24 PM NOT DETECTED Not Detected Final     Comment:      This assay detects the presence of the tcdB (toxin B)   gene via DNA amplification, and results should be   interpreted in the context of the patients history   and clinical findings. This test cannot be performed   on formed stools or used as a test of cure, and should   not be performed more than once per 7 days.            Assessment/Plan    Within goal AUC range. Continue current vancomycin regimen.    This dosing regimen is predicted by [employer] to result in the following pharmacokinetic parameters:    Regimen: 500 mg IV every 12 hours.  Start time: 12:47 on 02/05/2024  Exposure target: AUC24 (range)400-600 mg/L.hr   AUC24,ss: 568 mg/L.hr  Probability of AUC24 > 400: 93 %  Ctrough,ss: 20.7 mg/L  Probability of Ctrough,ss > 20: 54 %  Probability of nephrotoxicity (Lodise CHARLENE 2009): 19 %    The next level will be obtained on 2/6/24 at AM labs. May be obtained sooner if clinically indicated.   Will continue to monitor renal function daily while on vancomycin and order serum creatinine at least every 48 hours if not already ordered.  Follow for continued vancomycin needs, clinical response, and signs/symptoms of toxicity.     Suyeon Yeo, PharmD, [facility]

## 2024-02-05 NOTE — PROGRESS NOTES
Physical Therapy    Physical Therapy Evaluation    Patient Name: Nuris Squires  MRN: [id number]  Today's Date: 2/5/2024   Time Calculation  Start Time: 1118  Stop Time: 1132  Time Calculation (min): 14 min    Assessment/Plan   PT Assessment  End of Session Communication: Bedside nurse  Assessment Comment: pt admitted after syncope. pt dizzy with transition from supine>sit but improved. VSS throughout. pt able to ambulate with FWW and CGA. pt benefits from cont PT to address deficits in independence in mobility, strength and balance.  End of Session Patient Position: Bed, 2 rail up, Alarm off, not on at start of session, Alarm off, caregiver present  IP OR SWING BED PT PLAN  Inpatient or Swing Bed: Inpatient  PT Plan  Treatment/Interventions: Bed mobility, Stair training, Transfer training, Gait training, Balance training, Strengthening, Endurance training, Range of motion, Therapeutic exercise, Therapeutic activity, Home exercise program, Positioning  PT Plan: Skilled PT  PT Frequency: 3 times per week  PT Discharge Recommendations: Low intensity level of continued care  PT Recommended Transfer Status: Assist x1, Assistive device  PT - OK to Discharge: Yes      Subjective   General Visit Information:  General  Reason for Referral: AMS and syncope  Past Medical History Relevant to Rehab: sickle cell disease, diabetes, diabetic neuropathy, hypertension, recent pneumonia on 4 L nasal cannula  Co-Treatment: OT  Co-Treatment Reason: to maximize safe pt mobility and expedite DC planning  Prior to Session Communication: Bedside nurse  Patient Position Received: Bed, 3 rail up, Alarm off, not on at start of session  General Comment: pt supine in bed, alert and agreeable for therapy  Home Living:  Home Living  Type of Home: House  Lives With: Significant other  Home Adaptive Equipment: Walker rolling or standard  Home Layout: One level  Home Access: Stairs to enter with rails  Bathroom Shower/Tub: Tub/shower unit  Bathroom  Equipment: Shower chair with back  Prior Level of Function:  Prior Function Per Pt/Caregiver Report  Level of [place]: Independent with ADLs and functional transfers, Independent with homemaking with ambulation  Receives Help From: Family  ADL Assistance: Independent  Homemaking Assistance:  (BF assists with cooking and cleaning)  Ambulatory Assistance: Independent (FWW)  Precautions:  Precautions  LE Weight Bearing Status: Heel Weight Bearing in Post-Op Shoe (RLE)  Medical Precautions: Fall precautions  Vital Signs:  Vital Signs  BP:  (supine: 100/66; sitting 113/69; standing 125/)    Objective   Pain:  Pain Assessment  Pain Score: 0 - No pain  Cognition:  Cognition  Overall Cognitive Status: Within Functional Limits  Orientation Level: Oriented X4  Insight: Within function limits    General Assessments:                  Activity Tolerance  Endurance: Tolerates 10 - 20 min exercise with multiple rests    Sensation  Light Touch: No apparent deficits            Perception  Inattention/Neglect: Appears intact      Coordination  Movements are Fluid and Coordinated: Yes    Postural Control  Postural Control: Within Functional Limits    Static Sitting Balance  Static Sitting-Balance Support: Bilateral upper extremity supported  Static Sitting-Level of Assistance: Close supervision  Dynamic Sitting Balance  Dynamic Sitting-Balance Support: Bilateral upper extremity supported  Dynamic Sitting-Balance: Trunk control activities  Dynamic Sitting-Comments: CGA    Static Standing Balance  Static Standing-Balance Support: Bilateral upper extremity supported (FWW)  Static Standing-Level of Assistance: Close supervision  Dynamic Standing Balance  Dynamic Standing-Balance Support: Bilateral upper extremity supported (FWW)  Dynamic Standing-Balance: Turning (side steps)  Dynamic Standing-Comments: CGA  Functional Assessments:  Bed Mobility 1  Bed Mobility 1: Supine to sitting  Level of Assistance 1: Minimum assistance  Bed  Mobility Comments 1: HOB elevated    Transfers  Transfer: Yes  Transfer 1  Transfer From 1: Bed to  Transfer to 1: Stand  Technique 1: Sit to stand, Stand to sit  Transfer Device 1: Walker  Transfer Level of Assistance 1: Contact guard  Trials/Comments 1: 1    Ambulation/Gait Training  Ambulation/Gait Training Performed: Yes  Ambulation/Gait Training 1  Surface 1: Level tile  Device 1: Rolling walker  Assistance 1: Contact guard  Quality of Gait 1: Wide base of support, Decreased step length, Inconsistent stride length, Soft knee(s) (decreased floor clearance)  Comments/Distance (ft) 1: ~5 steps;; cues for heel WB  Extremity/Trunk Assessments:  RLE   RLE : Within Functional Limits  LLE   LLE : Within Functional Limits  Outcome Measures:  [facility] Basic Mobility  Turning from your back to your side while in a flat bed without using bedrails: A little  Moving from lying on your back to sitting on the side of a flat bed without using bedrails: A little  Moving to and from bed to chair (including a wheelchair): A little  Standing up from a chair using your arms (e.g. wheelchair or bedside chair): A little  To walk in hospital room: A little  Climbing 3-5 steps with railing: A little  Basic Mobility - Total Score: 18    Encounter Problems       Encounter Problems (Active)       Mobility       STG - Patient will ambulate >/= 250ft with SBA and FWW  (Progressing)       Start:  02/05/24    Expected End:  02/19/24            STG - Patient will ascend and descend four to six stairs SBA (Progressing)       Start:  02/05/24    Expected End:  02/19/24               Transfers       STG - Patient will perform bed mobility SBA (Progressing)       Start:  02/05/24    Expected End:  02/19/24            STG - Patient will transfer sit to and from stand SBA and FWW (Progressing)       Start:  02/05/24    Expected End:  02/19/24                   Education Documentation  Body Mechanics, taught by May Hernandez PT at 2/5/2024  2:33  PM.  Learner: Patient  Readiness: Acceptance  Method: Explanation  Response: Verbalizes Understanding    Mobility Training, taught by May Hernandez, PT at 2/5/2024  2:33 PM.  Learner: Patient  Readiness: Acceptance  Method: Explanation  Response: Verbalizes Understanding    Education Comments  No comments found.        02/05/24 at 2:33 PM - May Hernandez, PT

## 2024-02-05 NOTE — PROGRESS NOTES
Occupational Therapy    Evaluation    Patient Name: Nuris Squires  MRN: [id number]  Today's Date: 2/5/2024  Time Calculation  Start Time: 1118  Stop Time: 1132  Time Calculation (min): 14 min        Assessment:  OT Assessment: Pt presents to OT with increased falls risk, decreased safety and independence with functional transfers and mobility and impaired ADL performance.  Pt will continue to benefit from skilled OT services to address deficits and facilitate safe return to PLOF and home environment.    Prognosis: Good  Barriers to Discharge: None  Evaluation/Treatment Tolerance: Patient tolerated treatment well  Medical Staff Made Aware: Yes  End of Session Communication: Bedside nurse  End of Session Patient Position: Bed, 2 rail up, Alarm off, not on at start of session, Alarm off, caregiver present  OT Assessment Results: Decreased ADL status, Decreased endurance, Decreased functional mobility, Decreased IADLs  Prognosis: Good  Barriers to Discharge: None  Evaluation/Treatment Tolerance: Patient tolerated treatment well  Medical Staff Made Aware: Yes  Plan:  Treatment Interventions: ADL retraining, Functional transfer training, Endurance training, Equipment evaluation/education, Patient/family training, Compensatory technique education  OT Frequency: 2 times per week  OT Discharge Recommendations: Low intensity level of continued care  OT Recommended Transfer Status: Assist of 1  OT - OK to Discharge: Yes  Treatment Interventions: ADL retraining, Functional transfer training, Endurance training, Equipment evaluation/education, Patient/family training, Compensatory technique education    Subjective   Current Problem:  1. Syncope, unspecified syncope type        2. Pneumonia of left lower lobe due to infectious organism        3. Hypothermia, initial encounter          General:  General  Reason for Referral: AMS  Past Medical History Relevant to Rehab: sickle cell disease, diabetes, diabetic neuropathy,  hypertension, recent pneumonia on 4 L nasal cannula  Co-Treatment: PT  Co-Treatment Reason: skilled cotx to maximize pt safety and therapeutic potential focusing on discipline specific tasks  Prior to Session Communication: Bedside nurse  Patient Position Received: Bed, 3 rail up, Alarm off, not on at start of session  General Comment: Met in bed, pleasant and coopertative, focused on increasing IND with IADLs  Precautions:  LE Weight Bearing Status: Heel Weight Bearing in Post-Op Shoe (RLE)  Medical Precautions: Fall precautions  Vital Signs:  BP:  (100/66 supine 113/69 /74 stand)  Pain:  Pain Assessment  Pain Score: 0 - No pain    Objective   Cognition:  Overall Cognitive Status: Within Functional Limits  Orientation Level: Oriented X4  Insight: Within function limits  Impulsive: Within functional limits         Home Living:  Type of Home: House  Lives With: Significant other  Home Adaptive Equipment: Walker rolling or standard  Home Layout: One level  Home Access: Stairs to enter with rails  Entrance Stairs-Number of Steps: 5  Bathroom Shower/Tub: Tub/shower unit  Bathroom Equipment: Shower chair with back  Prior Function:  Level of [place]: Independent with ADLs and functional transfers, Independent with homemaking with ambulation  Receives Help From: Family  ADL Assistance: Independent  Homemaking Assistance:  (recently requiring A with IADLs from BF)  Ambulatory Assistance: Independent (with RW)     ADL:  LE Dressing Assistance: Maximal  LE Dressing Deficit:  (donning shoe)  Toileting Assistance with Device: Total (purewick)  Activity Tolerance:  Endurance: Tolerates 10 - 20 min exercise with multiple rests  Bed Mobility/Transfers: Bed Mobility  Bed Mobility: Yes  Bed Mobility 1  Bed Mobility 1: Supine to sitting  Level of Assistance 1: Minimum assistance    Transfers  Transfer: Yes  Transfer 1  Technique 1: Sit to stand, Stand to sit  Transfer Device 1: Walker  Transfer Level of Assistance 1:  Contact guard    Ambulation/Gait Training:  Ambulation/Gait Training  Ambulation/Gait Training Performed: Yes  Ambulation/Gait Training 1  Surface 1: Level tile  Device 1: Rolling walker  Assistance 1: Contact guard  Comments/Distance (ft) 1: facilitated limited functional mobility at bedside with RW and cGA  Sitting Balance:  Static Sitting Balance  Static Sitting-Comment/Number of Minutes: IND  Dynamic Sitting Balance  Dynamic Sitting-Comments: SBA  Standing Balance:  Static Standing Balance  Static Standing-Comment/Number of Minutes: CGA  Dynamic Standing Balance  Dynamic Standing-Comments: CGA      Vision:Vision - Basic Assessment  Current Vision: No visual deficits  Sensation:  Light Touch: No apparent deficits  Strength:  Strength Comments: BUE WFL  Perception:  Inattention/Neglect: Appears intact  Coordination:  Movements are Fluid and Coordinated: Yes   Hand Function:  Gross Grasp: Functional  Coordination: Functional  Extremities: RUE   RUE : Within Functional Limits and LUE   LUE: Within Functional Limits    Outcome Measures:[facility] Daily Activity  Putting on and taking off regular lower body clothing: A lot  Bathing (including washing, rinsing, drying): A little  Putting on and taking off regular upper body clothing: A little  Toileting, which includes using toilet, bedpan or urinal: A lot  Taking care of personal grooming such as brushing teeth: A little  Eating Meals: None  Daily Activity - Total Score: 17     and Brief Confusion Assessment Method (bCAM)  CAM Result: CAM -    Education Documentation  Precautions, taught by Tonya Walters OT at 2/5/2024  2:05 PM.  Learner: Patient  Readiness: Acceptance  Method: Explanation  Response: Verbalizes Understanding    ADL Training, taught by Tonya Walters OT at 2/5/2024  2:05 PM.  Learner: Patient  Readiness: Acceptance  Method: Explanation  Response: Verbalizes Understanding    Education Comments  No comments found.      Goals:  Encounter Problems        Encounter Problems (Active)       ADLs       Patient will demonstrate lower body dressing with modified independent level of assistancedonning and doffing all LE clothes  with PRN adaptive equipment        Start:  02/05/24    Expected End:  02/26/24            Patient will perform toileting tasks, including hygiene and clothing management with modified independent level of assistance         Start:  02/05/24    Expected End:  02/26/24               BALANCE       pt will demonstrate dynamic standing greater than 8 mins with 1-2 UE release at mod I in prep for cooking and bathing tasks        Start:  02/05/24    Expected End:  02/26/24               MOBILITY       pt will safely demonstrate functional mobility, to/from bathroom and at other household distances, navigating around environmental barriers with LRD and mod I         Start:  02/05/24    Expected End:  02/26/24 02/05/24 at 2:17 PM   Tonya Walters OT   Rehab Office: [phone]

## 2024-02-05 NOTE — PROGRESS NOTES
Internal Medicine Daily Progress Note    Subjective     Interval events:  Hypoglycemic at 0400 to 44, improved to 70s after juice. Vitals otherwise stable, afebrile. Denies CP, abd pain, dyspnea, fevers, chills, nausea, vomiting.       Objective     Vitals:  Vitals:    02/05/24 0047   BP: 122/79   Pulse: 67   Resp: 20   Temp: 36 °C (96.8 °F)   SpO2: 94%       I/O last 3 completed shifts:  In: 750 (7.3 mL/kg) [IV Piggyback:750]  Out: 1700 (16.7 mL/kg) [Urine:1700 (0.5 mL/kg/hr)]  Weight: 102.1 kg   I/O this shift:  In: 150 [IV Piggyback:150]  Out: -     Physical exam:  Constitutional: Well-developed female in no acute distress.  HEENT: Normocephalic, atraumatic.  Respiratory: CTA bilaterally. No wheezes, rales, or rhonchi. Normal respiratory effort. On 2L NC.  Cardiovascular: RRR. No murmurs, gallops, or rubs. No JVD. Radial pulses 2+.  Abdominal: Soft, nondistended, nontender to palpation. Bowel sounds present.  Neuro: CN II-XII intact. UE and LE strength 5/5 bilaterally and sensation intact. Normal FTN testing.  MSK: No LE edema bilaterally. R foot w/ healing partial great toe amputation, L foot w/ healed amputation of 2 toes  Skin: Warm, dry.   Psych: Appropriate mood and affect.    Medications:  amLODIPine, 10 mg, oral, Daily  aspirin, 81 mg, oral, Daily  atorvastatin, 40 mg, oral, Daily  cefTRIAXone, 2 g, intravenous, q24h  cholecalciferol, 2,000 Units, oral, Daily  cloNIDine, 0.1 mg, oral, BID  enoxaparin, 40 mg, subcutaneous, q12h NEHEMIAS  insulin glargine, 10 Units, subcutaneous, Nightly  insulin lispro, 10 Units, subcutaneous, TID with meals  magnesium oxide, 400 mg, oral, Daily  nitrofurantoin, 50 mg, oral, q6h NEHEMIAS  nystatin, 1 Application, Topical, BID  oxygen, , inhalation, q24h  vancomycin, 500 mg, intravenous, q12h         PRN medications: albuterol, dextrose 10 % in water (D10W), dextrose, glucagon, meclizine, oxygen    Labs:      Results from last 72 hours   Lab Units 02/05/24  0446 02/04/24  0806  02/04/24  0555 02/03/24  0122   WBC AUTO x10*3/uL 5.4 5.5 8.2 4.3*   HEMOGLOBIN g/dL 8.0* 9.4* 6.2* 10.3*   HEMATOCRIT % 23.8* 29.4* 20.1* 31.1*   PLATELETS AUTO x10*3/uL 391 454* 516* 482*   SODIUM mmol/L 144  --  144 147*   POTASSIUM mmol/L 3.8  --  3.7 4.6   CHLORIDE mmol/L 107  --  107 107   CO2 mmol/L 29  --  29 26   BUN mg/dL 19  --  20 19   CREATININE mg/dL 1.76*  --  1.52* 0.89   CALCIUM mg/dL 8.8  --  9.0 9.6   MAGNESIUM mg/dL 2.00  --  1.94  --    PHOSPHORUS mg/dL 4.6  --  4.3  --         Imaging:  No results found.          Assessment and plan:  Nuris Squires is a 58 y.o. female with PMHx of T2DM (HbA1c 13 01/2024) c/b neuropathy, HTN, CKD stage III, multiple foot infections s/p recent partial R big toe amputation in which she grew Group B strep and was on 6 week course of ceftriaxone via tunneled line (Serena catheter placed 01/15/24, CTX to be continued through 02/21/24), recent admission for PNA on 4L home O2 that presented on 02/02/24 with altered mental status and syncope. Patient noted to have hypoglycemia and was found to be significantly hypothermic on admission. Of note, patient has had recurrent hypoglycemia since admission and admission blood cultures (4/4) now growing coagulase negative staph, suggesting that bacteremia may be leading to recurrent hypoglycemia. Potential sources include central line infection, secondary infection of toe, or potentially urine (patient notably grew VRE in urine culture on 01/31, but this was thought to be an asymptomatic colonization and was never treated).      Updates 2/5:  - continues to be hypoglycemic after insulin was held, will ctm and if not resolving consider endocrine consult  - stop nitrofurantoin d/t renal function, ID recommendation to stop  - staph epi sensitivity is MRSE    #Coagulase Negative Staph bacteremia  #s/p R great toe partial amputation  #GBS osteomyelitis  #Hypothermia, resolved  - s/p R great toe partial amputation on 01/12 with  podiatry for source control of infection  - intra-op culture grew rare Group B streptococcus  - R chest tunneled line placed 01/15 for 6 weeks of IV ceftriaxone planned end 02/21/2024  - Echo during last admission 01/25 shows LVEF 70-75%, mildly increased LV septal wall thickness, otherwise unremarkable  - Culture data  - Blood cultures x2 02/03/24, MRSE 4/4 bottles  - ID consulted, appreciate recs  - Switched from Zosyn to Ceftriaxone on 2/4 per ID recs  Plan:  - Repeat blood cultures 2/5  - Continue IV Vancomycin (2/3-), ceftriaxone (2/4-)  - Discontinue nitrofurantoin     #T2DM c/b neuropathy, CKD III  #Recurrent hypoglycemia  - A1c 13.1% on 01/24/2024  - Home regimen: lantus 70 units, prandial lispro 10 units TID  - Got 50 units Lantus on evening of 2/3 with AM BG 2/4 in the 20's  - Suspect bacteremia is driving hypoglycemia  Plan:  - Holding all insulin while continues to by hypoglycemic  - hold metformin       #recent PNA  - discharged home on 4L O2 on 01/31/2024  - incentive spirometer   - Wean O2 as tolerated for goal SpO2 > 92%     #HTN  - Continue home clonidine and amlodipine     #DLD  - continue atorvastatin 40 mg  - Continue home aspirin 81 mg (Can discuss stopping with patient as this appears to be for primary prevention)    #CKD Stage III  - baseline Cr around 1.4  - Cr stable around 1.5      Diet: Diabetic 75 carb/meal 45 carb/snack  Access: PIV, tunneled line (placed 01/15/24, likely needs to be removed)  DVT prophylaxis: Lovenox BID  Code status: Full code (confirmed on admission)  NOK: Rousseau,Brandon (Significant Other)  [phone]     Patient and plan discussed with attending physician.    Melba Andrew MD  PGY-1 Internal Medicine

## 2024-02-06 ENCOUNTER — APPOINTMENT (OUTPATIENT)
Dept: PRIMARY CARE | Facility: CLINIC | Age: 59
End: 2024-02-06
Payer: COMMERCIAL

## 2024-02-06 LAB
ALBUMIN SERPL BCP-MCNC: 2.9 G/DL (ref 3.4–5)
ANION GAP SERPL CALC-SCNC: 10 MMOL/L (ref 10–20)
BACTERIA BLD AEROBE CULT: ABNORMAL
BACTERIA BLD CULT: ABNORMAL
BASOPHILS # BLD AUTO: 0.06 X10*3/UL (ref 0–0.1)
BASOPHILS NFR BLD AUTO: 1.2 %
BUN SERPL-MCNC: 17 MG/DL (ref 6–23)
CALCIUM SERPL-MCNC: 8.5 MG/DL (ref 8.6–10.6)
CHLORIDE SERPL-SCNC: 108 MMOL/L (ref 98–107)
CO2 SERPL-SCNC: 30 MMOL/L (ref 21–32)
CREAT SERPL-MCNC: 1.77 MG/DL (ref 0.5–1.05)
EGFRCR SERPLBLD CKD-EPI 2021: 33 ML/MIN/1.73M*2
EOSINOPHIL # BLD AUTO: 0.24 X10*3/UL (ref 0–0.7)
EOSINOPHIL NFR BLD AUTO: 4.8 %
ERYTHROCYTE [DISTWIDTH] IN BLOOD BY AUTOMATED COUNT: 13.4 % (ref 11.5–14.5)
GLUCOSE BLD MANUAL STRIP-MCNC: 148 MG/DL (ref 74–99)
GLUCOSE BLD MANUAL STRIP-MCNC: 164 MG/DL (ref 74–99)
GLUCOSE BLD MANUAL STRIP-MCNC: 195 MG/DL (ref 74–99)
GLUCOSE BLD MANUAL STRIP-MCNC: 279 MG/DL (ref 74–99)
GLUCOSE SERPL-MCNC: 143 MG/DL (ref 74–99)
GRAM STN SPEC: ABNORMAL
GRAM STN SPEC: ABNORMAL
HCT VFR BLD AUTO: 24.8 % (ref 36–46)
HGB BLD-MCNC: 8.1 G/DL (ref 12–16)
IMM GRANULOCYTES # BLD AUTO: 0.02 X10*3/UL (ref 0–0.7)
IMM GRANULOCYTES NFR BLD AUTO: 0.4 % (ref 0–0.9)
LIPASE SERPL-CCNC: 5 U/L (ref 9–82)
LYMPHOCYTES # BLD AUTO: 2.32 X10*3/UL (ref 1.2–4.8)
LYMPHOCYTES NFR BLD AUTO: 46 %
MAGNESIUM SERPL-MCNC: 2.14 MG/DL (ref 1.6–2.4)
MCH RBC QN AUTO: 30.1 PG (ref 26–34)
MCHC RBC AUTO-ENTMCNC: 32.7 G/DL (ref 32–36)
MCV RBC AUTO: 92 FL (ref 80–100)
MONOCYTES # BLD AUTO: 0.64 X10*3/UL (ref 0.1–1)
MONOCYTES NFR BLD AUTO: 12.7 %
NEUTROPHILS # BLD AUTO: 1.76 X10*3/UL (ref 1.2–7.7)
NEUTROPHILS NFR BLD AUTO: 34.9 %
NRBC BLD-RTO: 0 /100 WBCS (ref 0–0)
PHOSPHATE SERPL-MCNC: 4.1 MG/DL (ref 2.5–4.9)
PLATELET # BLD AUTO: 390 X10*3/UL (ref 150–450)
POTASSIUM SERPL-SCNC: 4.3 MMOL/L (ref 3.5–5.3)
RBC # BLD AUTO: 2.69 X10*6/UL (ref 4–5.2)
SODIUM SERPL-SCNC: 144 MMOL/L (ref 136–145)
VANCOMYCIN TROUGH SERPL-MCNC: 19.3 UG/ML (ref 5–20)
VANCOMYCIN TROUGH SERPL-MCNC: 23.7 UG/ML (ref 5–20)
WBC # BLD AUTO: 5 X10*3/UL (ref 4.4–11.3)

## 2024-02-06 PROCEDURE — 83735 ASSAY OF MAGNESIUM: CPT

## 2024-02-06 PROCEDURE — 83690 ASSAY OF LIPASE: CPT

## 2024-02-06 PROCEDURE — 80069 RENAL FUNCTION PANEL: CPT

## 2024-02-06 PROCEDURE — 85025 COMPLETE CBC W/AUTO DIFF WBC: CPT

## 2024-02-06 PROCEDURE — 80202 ASSAY OF VANCOMYCIN: CPT | Performed by: INTERNAL MEDICINE

## 2024-02-06 PROCEDURE — 82947 ASSAY GLUCOSE BLOOD QUANT: CPT

## 2024-02-06 PROCEDURE — 99233 SBSQ HOSP IP/OBS HIGH 50: CPT | Performed by: INTERNAL MEDICINE

## 2024-02-06 PROCEDURE — 80202 ASSAY OF VANCOMYCIN: CPT | Performed by: PHARMACY TECHNICIAN

## 2024-02-06 PROCEDURE — 2500000004 HC RX 250 GENERAL PHARMACY W/ HCPCS (ALT 636 FOR OP/ED): Performed by: STUDENT IN AN ORGANIZED HEALTH CARE EDUCATION/TRAINING PROGRAM

## 2024-02-06 PROCEDURE — 2500000004 HC RX 250 GENERAL PHARMACY W/ HCPCS (ALT 636 FOR OP/ED)

## 2024-02-06 PROCEDURE — 2500000004 HC RX 250 GENERAL PHARMACY W/ HCPCS (ALT 636 FOR OP/ED): Performed by: PHARMACY TECHNICIAN

## 2024-02-06 PROCEDURE — 2500000001 HC RX 250 WO HCPCS SELF ADMINISTERED DRUGS (ALT 637 FOR MEDICARE OP)

## 2024-02-06 PROCEDURE — 2060000001 HC INTERMEDIATE ICU ROOM DAILY

## 2024-02-06 RX ORDER — VANCOMYCIN HYDROCHLORIDE 750 MG/150ML
750 INJECTION, SOLUTION INTRAVENOUS ONCE
Status: COMPLETED | OUTPATIENT
Start: 2024-02-06 | End: 2024-02-06

## 2024-02-06 RX ORDER — ACETAMINOPHEN 325 MG/1
975 TABLET ORAL EVERY 6 HOURS PRN
Status: DISCONTINUED | OUTPATIENT
Start: 2024-02-06 | End: 2024-02-10 | Stop reason: HOSPADM

## 2024-02-06 RX ADMIN — ACETAMINOPHEN 975 MG: 325 TABLET ORAL at 09:07

## 2024-02-06 RX ADMIN — ASPIRIN 81 MG 81 MG: 81 TABLET ORAL at 06:42

## 2024-02-06 RX ADMIN — CLONIDINE HYDROCHLORIDE 0.1 MG: 0.1 TABLET ORAL at 09:07

## 2024-02-06 RX ADMIN — INSULIN LISPRO 1 UNITS: 100 INJECTION, SOLUTION INTRAVENOUS; SUBCUTANEOUS at 12:43

## 2024-02-06 RX ADMIN — SODIUM CHLORIDE 500 ML: 9 INJECTION, SOLUTION INTRAVENOUS at 10:28

## 2024-02-06 RX ADMIN — ENOXAPARIN SODIUM 40 MG: 100 INJECTION SUBCUTANEOUS at 09:05

## 2024-02-06 RX ADMIN — NYSTATIN 1 APPLICATION: 100000 POWDER TOPICAL at 09:08

## 2024-02-06 RX ADMIN — VANCOMYCIN HYDROCHLORIDE 750 MG: 750 INJECTION, SOLUTION INTRAVENOUS at 15:42

## 2024-02-06 RX ADMIN — ATORVASTATIN CALCIUM 40 MG: 40 TABLET, FILM COATED ORAL at 09:07

## 2024-02-06 RX ADMIN — ENOXAPARIN SODIUM 40 MG: 100 INJECTION SUBCUTANEOUS at 20:36

## 2024-02-06 RX ADMIN — Medication 2000 UNITS: at 06:42

## 2024-02-06 RX ADMIN — AMLODIPINE BESYLATE 10 MG: 10 TABLET ORAL at 09:07

## 2024-02-06 RX ADMIN — CLONIDINE HYDROCHLORIDE 0.1 MG: 0.1 TABLET ORAL at 20:36

## 2024-02-06 RX ADMIN — CEFTRIAXONE SODIUM 2 G: 2 INJECTION, SOLUTION INTRAVENOUS at 17:31

## 2024-02-06 RX ADMIN — Medication 400 MG: at 09:07

## 2024-02-06 RX ADMIN — INSULIN LISPRO 1 UNITS: 100 INJECTION, SOLUTION INTRAVENOUS; SUBCUTANEOUS at 17:32

## 2024-02-06 ASSESSMENT — COGNITIVE AND FUNCTIONAL STATUS - GENERAL
HELP NEEDED FOR BATHING: A LITTLE
MOVING TO AND FROM BED TO CHAIR: A LITTLE
DRESSING REGULAR UPPER BODY CLOTHING: A LITTLE
WALKING IN HOSPITAL ROOM: A LITTLE
WALKING IN HOSPITAL ROOM: A LITTLE
MOVING TO AND FROM BED TO CHAIR: A LITTLE
HELP NEEDED FOR BATHING: A LITTLE
DAILY ACTIVITIY SCORE: 19
PERSONAL GROOMING: A LITTLE
DRESSING REGULAR UPPER BODY CLOTHING: A LITTLE
TOILETING: A LITTLE
CLIMB 3 TO 5 STEPS WITH RAILING: A LITTLE
TOILETING: A LITTLE
STANDING UP FROM CHAIR USING ARMS: A LITTLE
DAILY ACTIVITIY SCORE: 19
DRESSING REGULAR LOWER BODY CLOTHING: A LITTLE
MOBILITY SCORE: 20
DRESSING REGULAR LOWER BODY CLOTHING: A LITTLE
CLIMB 3 TO 5 STEPS WITH RAILING: A LITTLE
STANDING UP FROM CHAIR USING ARMS: A LITTLE
MOBILITY SCORE: 20
PERSONAL GROOMING: A LITTLE

## 2024-02-06 ASSESSMENT — PAIN - FUNCTIONAL ASSESSMENT
PAIN_FUNCTIONAL_ASSESSMENT: 0-10

## 2024-02-06 ASSESSMENT — PAIN DESCRIPTION - DESCRIPTORS: DESCRIPTORS: ACHING

## 2024-02-06 NOTE — NURSING NOTE
Assumed care of patient after receiving report from departing RN. Pt lying in bed watching TV with  at her bedside. Respirations are even and unlabored, and no distress noted on exam. Patient expresses no needs at this time. Bed in locked and lowered position with call light within reach. All other safety measures are intact. Will continue with current plan of care and update as necessary.   Pt notified   Form out for signature

## 2024-02-06 NOTE — PROGRESS NOTES
Subjective   Nuris Squires is a 58 y.o. female on hospital day 3 without further episodes of hypoglycemia.  Had no other problems including no presyncope/syncopal episodes.  She does report some mild epigastric fullness/early satiety when eating over the past few days but was not present prior to admission.  No nausea or vomiting with it.    Objective     Exam     Vitals:    02/05/24 1629 02/05/24 2019 02/06/24 0748 02/06/24 1119   BP: 148/75 139/72 142/66 120/55   BP Location:       Patient Position:       Pulse: 71 71 64 65   Resp: 19 19 18 17   Temp: 36.3 °C (97.3 °F) 36.9 °C (98.4 °F) 36.6 °C (97.9 °F) 36.5 °C (97.7 °F)   TempSrc: Temporal  Temporal Temporal   SpO2: 97% 97% 95% 94%   Weight:       Height:          Intake/Output last 3 shifts:  I/O last 3 completed shifts:  In: 400 (3.9 mL/kg) [IV Piggyback:400]  Out: 400 (3.9 mL/kg) [Urine:400 (0.1 mL/kg/hr)]  Weight: 102.1 kg     Physical Exam  Vitals reviewed.   Constitutional:       Comments: Very pleasant   HENT:      Head: Normocephalic and atraumatic.      Mouth/Throat:      Mouth: Mucous membranes are moist.   Neck:      Vascular: No carotid bruit.   Cardiovascular:      Rate and Rhythm: Normal rate and regular rhythm.      Pulses: Normal pulses.      Heart sounds: No murmur heard.     No friction rub. No gallop.      Comments: Chest wall tunneled PICC unremarkable  Pulmonary:      Effort: Pulmonary effort is normal.      Breath sounds: Normal breath sounds. No wheezing, rhonchi or rales.   Abdominal:      General: Bowel sounds are normal. There is no distension.      Palpations: Abdomen is soft.      Tenderness: There is abdominal tenderness (Mild epigastric). There is no guarding or rebound.   Musculoskeletal:      Cervical back: Neck supple.      Right lower leg: No edema.      Left lower leg: No edema.      Comments: Right great toe amputation with no significant erythema or tenderness or active drainage expressible from incision site.  No  fluctuant sites   Skin:     General: Skin is warm and dry.      Comments: Candidal rash under pannus and in groin region without signs of further cellulitis   Neurological:      General: No focal deficit present.      Mental Status: She is alert and oriented to person, place, and time.   Psychiatric:         Mood and Affect: Mood normal.         Behavior: Behavior normal.            Medications   amLODIPine, 10 mg, oral, Daily  aspirin, 81 mg, oral, Daily  atorvastatin, 40 mg, oral, Daily  cefTRIAXone, 2 g, intravenous, q24h  cholecalciferol, 2,000 Units, oral, Daily  cloNIDine, 0.1 mg, oral, BID  enoxaparin, 40 mg, subcutaneous, q12h NEHEMIAS  insulin lispro, 0-5 Units, subcutaneous, TID with meals  magnesium oxide, 400 mg, oral, Daily  nystatin, 1 Application, Topical, BID  oxygen, , inhalation, q24h  sennosides-docusate sodium, 2 tablet, oral, BID  vancomycin, 750 mg, intravenous, Once       PRN medications: acetaminophen, albuterol, dextrose 10 % in water (D10W), dextrose, glucagon, meclizine, oxygen, vancomycin       Labs     All new labs reviewed:  some of the basic labs as follows -     Results from last 7 days   Lab Units 02/06/24  0735 02/05/24  0446 02/04/24  0819 02/04/24  0555   WBC AUTO x10*3/uL 5.0 5.4 5.5 8.2   HEMOGLOBIN g/dL 8.1* 8.0* 9.4* 6.2*   HEMATOCRIT % 24.8* 23.8* 29.4* 20.1*   PLATELETS AUTO x10*3/uL 390 391 454* 516*   NEUTROS PCT AUTO % 34.9 45.1  --  45.4   LYMPHS PCT AUTO % 46.0 37.0  --  38.1   MONOS PCT AUTO % 12.7 13.1  --  11.3   EOS PCT AUTO % 4.8 3.5  --  4.1            Results from last 72 hours   Lab Units 02/06/24  0735 02/05/24  0446 02/04/24  0555   SODIUM mmol/L 144 144 144   POTASSIUM mmol/L 4.3 3.8 3.7   CHLORIDE mmol/L 108* 107 107   CO2 mmol/L 30 29 29   BUN mg/dL 17 19 20   CREATININE mg/dL 1.77* 1.76* 1.52*       Results from last 72 hours   Lab Units 02/06/24  0735 02/05/24  0446 02/04/24  0555   ALBUMIN g/dL 2.9* 2.8* 3.1*   LIPASE U/L 5*  --   --        Results from last  "72 hours   Lab Units 02/06/24  0735 02/05/24  0446 02/04/24  0555   GLUCOSE mg/dL 143* 71* 27*             No results found for: \"TR1\"  Lab Results   Component Value Date    URINECULTURE No significant growth 02/03/2024    BLOODCULT No growth at 1 day 02/05/2024    BLOODCULT No growth at 1 day 02/05/2024    BLOODCULT Staphylococcus epidermidis (AA) 02/03/2024    BLOODCULT Staphylococcus epidermidis (AA) 02/03/2024            Imaging   ECG 12 Lead  Normal sinus rhythm  Possible Anterior infarct (cited on or before 15-ISAK-2024)  Abnormal ECG  When compared with ECG of 24-JAN-2024 20:35,  Premature supraventricular complexes are no longer Present  QRS axis Shifted left  Confirmed by Abhinav Wallace ([id number]) on 2/3/2024 11:09:00 PM  CT head wo IV contrast, CT cervical spine wo IV contrast  Narrative: Interpreted By:  Cheyanne Herrera and Dervishi Mario   STUDY:  CT HEAD WO IV CONTRAST; CT CERVICAL SPINE WO IV CONTRAST;  2/3/2024  12:24 am      INDICATION:  Signs/Symptoms:Unwitnessed fall, altered mental status      COMPARISON:  CT head: 06/27/2023, CT C-spine 01/25/2023      ACCESSION NUMBER(S):  [id number]; [id number]      ORDERING CLINICIAN:  CHEYANNE REYES      TECHNIQUE:  Axial noncontrast CT images of head with coronal and sagittal  reconstructed images. Axial noncontrast CT images of the cervical  spine with coronal and sagittal reconstructed images.      FINDINGS:  CT HEAD:      BRAIN PARENCHYMA: Unchanged calcification of the dentate nuclei and  globus pallidi. No acute intraparenchymal hemorrhage or parenchymal  evidence of acute large territory ischemic infarct. No mass-effect.  Gray-white matter distinction is preserved.      VENTRICLES and EXTRA-AXIAL SPACES:  No acute extra-axial or  intraventricular hemorrhage. No effacement of cerebral sulci.  Ventricles and sulci are age-concordant.      PARANASAL SINUSES/MASTOIDS:  No hemorrhage or air-fluid levels within  the visualized paranasal sinuses. The mastoids " are well aerated.      CALVARIUM/ORBITS:  No skull fracture.  The orbits and globes are  intact to the extent visualized.      EXTRACRANIAL SOFT TISSUES: No discernible abnormality.          CT CERVICAL SPINE:      PREVERTEBRAL SOFT TISSUES: Within normal limits.      CRANIOCERVICAL JUNCTION: Intact.      ALIGNMENT:  No traumatic malalignment or traumatic facet widening.      VERTEBRAE: Vertebral body heights are maintained. No acute fracture.  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  Thick ossification of the anterior longitudinal ligament and bridging  osteophytes at C3-C4 through C6-C7 compatible with DISH.      SPINAL CANAL/INTERVERTEBRAL DISCS: No high-grade spinal canal  stenosis. No significant disc height loss.      NEURAL FORAMINA: Multilevel facet and uncovertebral joint arthropathy  result in mild right C4-C5 foraminal stenosis but no high-grade  foraminal stenosis.      OTHER: None.      Impression: CT HEAD:  1. No acute intracranial abnormality or calvarial fracture.      CT CERVICAL SPINE:  1. No acute fracture or traumatic malalignment of the cervical spine.  2. Redemonstration of DISH without significant canal or foraminal  stenosis.  3. Redemonstration of chronic ununited fracture of the C6 spinous  process.      I personally reviewed the images/study and I agree with the findings  as stated by Resident Yovany Tolliver MD. This study was interpreted  at [place], [place].      MACRO:  None.      Signed by: Mike Herrera 2/3/2024 1:56 AM  Dictation workstation:   [id number]  XR chest 1 view  Narrative: STUDY:  Chest Radiograph;  2/2/2024 11:56 PM  INDICATION:  Fall and altered mental status.  COMPARISON:  1/30/2024 XR Chest two view  ACCESSION NUMBER(S):  [id number]  ORDERING CLINICIAN:  Mike Nino  TECHNIQUE:  Frontal chest was obtained at 23:56 hours.  FINDINGS:  CARDIOMEDIASTINAL SILHOUETTE:  Cardiomediastinal  silhouette is enlarged in size and configuration.   Right IJ central venous catheter is seen with the tip in the upper  SVC.     LUNGS:  Increased interstitial markings are seen bilaterally with airspace  disease at the left lung base and along left cardiac border.  Left  pleural effusion may be present.     ABDOMEN:  No remarkable upper abdominal findings.     BONES:  No acute osseous changes.  Impression: Cardiomegaly with increased interstitial markings bilaterally and left  basilar airspace disease with question of a left effusion.  CHF is a  consideration however left lower lobe pneumonia is suspected in the  appropriate clinical setting.  Signed by Jhon Stanton     No results found for this or any previous visit from the past 1095 days.     Encounter Date: 01/24/24   ECG 12 Lead   Result Value    Ventricular Rate 77    Atrial Rate 77    UT Interval 134    QRS Duration 78    QT Interval 396    QTC Calculation(Bazett) 448    P Axis 39    R Axis 26    T Axis 19    QRS Count 12    Q Onset 222    P Onset 155    P Offset 215    T Offset 420    QTC Fredericia 430    Narrative    Normal sinus rhythm  Possible Anterior infarct (cited on or before 15-ISAK-2024)  Abnormal ECG  When compared with ECG of 24-JAN-2024 20:35,  Premature supraventricular complexes are no longer Present  QRS axis Shifted left  Confirmed by Abhinav Wallace ([id number]) on 2/3/2024 11:09:00 PM        Assessment and Plan     Syncope: Unclear if related to infection versus hypoglycemia versus recent over diuresis.  Orthostatics were negative although multiple days into admission  -Continue antibiotics (vancomycin and ceftriaxone)  -Follow-up repeat blood cultures  -Follow-up further infectious disease recommendations.  Determining fate of tunneled central access that she is using for her ceftriaxone administration as outpatient    Cardiovascular:  -Continue clonidine and amlodipine  -Continue statin and aspirin  -Holding home lisinopril    Diabetes  mellitus: Blood glucose now running elevated once again.  Last hemoglobin A1c is 13.1.  Required 2 units of sliding scale insulin yesterday  -Favor starting low-dose Lantus this evening i.e. 5 to 10 units depending on today sliding scale insulin needs.  -Continue mild sliding scale  -Hold metformin at this time  -If blood glucose drops again below 50 would ideally draw stat serum glucose, insulin, C-peptide, insulin growth factor and sulfonylurea level as well as consulting endocrinology  -Check a.m. cortisol    Pulmonary:  -Incentive spirometer 10 times an hour while awake  -Wean O2    acute renal failure: Creatinine now stable at approximately 1.7.  Overall baseline appears possibly closer to 1.5  -Monitor renal function panel  -Will give gentle IV bolus.    -Monitor PVR  -If not improving can check urine electrolytes    Anemia: Hemoglobin stable at 8.1 today  -Monitor CBC  -Monitor for gross blood losses including in stools    GI: Early satiety although taking p.o. well it appears.  Patient ate 100% of her breakfast that appeared this morning and appeared well without pain/nausea/vomiting.  Possibly gastroparesis although not clear why has been acutely exacerbated in just the past couple days.  Lipase is unremarkable.  -PPI  -Bowel care  -If not improving likely start with abdominal x-ray series.    DVT prophylaxis    Physical therapy/Occupational Therapy when appropriate    Austin Nance MD     Of note the above was done with Dragon dictation system.  Note was proofread to minimize errors.

## 2024-02-06 NOTE — PROGRESS NOTES
Vancomycin Dosing by Pharmacy- FOLLOW UP    Nuris Squires is a 58 y.o. year old female who Pharmacy has been consulted for vancomycin dosing for pneumonia - based on discussion with the team and per chart review - indication is now MRSE bacteremia. Based on the patient's indication and renal status this patient is being dosed based on a goal trough/random level of 15-20.    VENKAT on CKD but scr stable compared to yesterday, still above BL.    Current vancomycin dose: 500 mg given every 12 hours (last dose given ~2100 last night)    Most recent trough level: 23.7 mcg/mL (12 hour trough level)     Visit Vitals  /55   Pulse 65   Temp 36.5 °C (97.7 °F) (Temporal)   Resp 17        Lab Results   Component Value Date    CREATININE 1.77 (H) 02/06/2024    CREATININE 1.76 (H) 02/05/2024    CREATININE 1.52 (H) 02/04/2024    CREATININE 0.89 02/03/2024        MRSA PCR negative  2/3 urine cx NGTD  2/3 blood cx 4/4 with Staph epi - resistant to clindamycin, erythromycin, oxacillin  2/5 blood cx NGTD    I/O last 3 completed shifts:  In: 400 (3.9 mL/kg) [IV Piggyback:400]  Out: 400 (3.9 mL/kg) [Urine:400 (0.1 mL/kg/hr)]  Weight: 102.1 kg       Lab Results   Component Value Date    PATIENTTEMP 37.0 02/03/2024    PATIENTTEMP 37.0 01/24/2024        Assessment/Plan    Above goal random/trough level. Orders placed for new vancomycin regimen of 750 mg once today.  The next level will be obtained on 2/7 at 1200. May be obtained sooner if clinically indicated.   Will continue to monitor renal function daily while on vancomycin and order serum creatinine at least every 48 hours if not already ordered.  Follow for continued vancomycin needs, clinical response, and signs/symptoms of toxicity.       Tatum Lanza, PharmD

## 2024-02-06 NOTE — HOSPITAL COURSE
Nuris Squires is a 58 y.o. female with PMH T2DM on home insulin (uncontrolled) c/b neuropathy, HTN, CKD stage III, multiple foot infections s/p recent partial R great toe amputation (1/12), recent admission for PNA (1/24-1/31) on 4L home O2 presenting with altered mental status, syncope, hypothermia, and hypoglycemia. 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 She had also had a positive urine culture for E faecium on 1/29 during her previous admission, though patient was asymptomatic. It was unclear whether her symptoms at presentation were related to orthostasis vs a possible infectious cause. Upon admission she was started on IV Zosyn, vancomycin, and nitrofurantoin for possible E faecium UTI. ID consulted at that time, and nitrofurantoin was discontinued on 2/5 because patient was asymptomatic per ID recommendations. Peripheral blood cultures collected on 2/3 were positive 4/4 bottles for methicillin-resistant staph epidermidis. ID was consulted regarding antibiotic therapy and need to remove central line. Repeat blood cultures on 2/5 were NGTD. ID believed the MRSE was contaminant, recommended keeping in the central line, discontinuing vancomycin (discontinued on 2/6). They recommended to continue ceftriaxone for GBS osteomyelitis until 2/21 as previously planned.     UA obtained on 2/9 as part of VENKAT workup was positive for leukocyte esterase, WBC and RBC, and urine culture was positive for Enterococcus faecium. However, patient denied any dysuria, hematuria or changes in urination. Antibiotics not given at the time of discharge due to lack of urinary symptoms, but patient counseled extensively on need to present to ED if develops fevers, chills, burning on urination, changes in urinary frequency or hematuria.    Regarding her hypoglycemia, she was originally on 70 units lantus daily with  mealtime lispro at home for poorly controlled T2DM w/ A1c of 13, though peculiarly was persistently hypoglycemic down to the 20s-40s despite lowering and eventual cessation of her insulin regimen. Hypoglycemia was thought to potentially be related to bacteremia and began improving on 2/6 with blood glucoses in the upper 100s-mid 200s. Gradually increased lantus from 5u to 8u at bedtime for the next few days, with plan to discharge on 12u lantus at bedtime, with instructions to increase to 15u lantus if blood glucose readings above 150s for a few days. Will be on mild SSI at mealtimes on discharge. Given diabetes history, may consider starting on an ACEi or ARB once VENKAT resolves.    She had VENKAT on CKD III with Cr of 1.7-2.0 (baseline 1.4-1.5) which was fluid responsive and improved with hydration.    Initially came in on 4L home O2 from recent PNA though was able to be weaned to 2L NC prior to discharge home.    Her amputation wound from January was evaluated by podiatry who recommended outpatient follow up.

## 2024-02-06 NOTE — PROGRESS NOTES
"Nuris Squires is a 58 y.o. female on day 3 of admission presenting with Syncope, unspecified syncope type.    Subjective   NAEON. Endorsing HA this morning, says feels like her temples are being compressed bilaterally, but improved with aspirin. Says this is unlike the HA she gets when she's hypoglycemic. Blood glucose at home tends to be in the 200s-400s typically, and she has previously been known to have symptoms of hypoglycemia (HA, dizziness, sweating) with readings in the 200s. Also endorsing feeling of early satiety and like food is sticking in her stomach which she noticed within the past 2-3 days. Denies any heartburn, chest pain, abdominal pain, N/V, or changes in bowel habits. Had 1 formed stool overnight.        Objective     Physical Exam  Cardiovascular:      Rate and Rhythm: Normal rate and regular rhythm.      Heart sounds: Normal heart sounds.   Pulmonary:      Effort: Pulmonary effort is normal.      Breath sounds: Normal breath sounds.   Abdominal:      General: Abdomen is flat. There is no distension.      Palpations: Abdomen is soft.      Tenderness: There is abdominal tenderness in the epigastric area. There is no guarding.   Feet:      Right foot:      Skin integrity: Dry skin present. No ulcer, blister, erythema or warmth.      Left foot:      Skin integrity: Dry skin present. No ulcer, blister, erythema or warmth.      Comments: Right foot with partial big toe amputation. Left foot with 2nd toe amputation.   Neurological:      Mental Status: She is alert.       Last Recorded Vitals  Blood pressure 142/66, pulse 64, temperature 36.6 °C (97.9 °F), resp. rate 18, height 1.575 m (5' 2\"), weight 102 kg (225 lb), SpO2 95 %.  Intake/Output last 3 Shifts:  I/O last 3 completed shifts:  In: 400 (3.9 mL/kg) [IV Piggyback:400]  Out: 400 (3.9 mL/kg) [Urine:400 (0.1 mL/kg/hr)]  Weight: 102.1 kg     Relevant Results    Scheduled medications  amLODIPine, 10 mg, oral, Daily  aspirin, 81 mg, oral, " Daily  atorvastatin, 40 mg, oral, Daily  cefTRIAXone, 2 g, intravenous, q24h  cholecalciferol, 2,000 Units, oral, Daily  cloNIDine, 0.1 mg, oral, BID  enoxaparin, 40 mg, subcutaneous, q12h NEHEMIAS  insulin lispro, 0-5 Units, subcutaneous, TID with meals  magnesium oxide, 400 mg, oral, Daily  nystatin, 1 Application, Topical, BID  oxygen, , inhalation, q24h  sennosides-docusate sodium, 2 tablet, oral, BID      Continuous medications     PRN medications  PRN medications: albuterol, dextrose 10 % in water (D10W), dextrose, glucagon, meclizine, oxygen, vancomycin    Results for orders placed or performed during the hospital encounter of 02/02/24 (from the past 24 hour(s))   POCT GLUCOSE   Result Value Ref Range    POCT Glucose 118 (H) 74 - 99 mg/dL   POCT GLUCOSE   Result Value Ref Range    POCT Glucose 150 (H) 74 - 99 mg/dL   Vancomycin, Trough   Result Value Ref Range    Vancomycin, Trough 18.5 5.0 - 20.0 ug/mL   POCT GLUCOSE   Result Value Ref Range    POCT Glucose 224 (H) 74 - 99 mg/dL   POCT GLUCOSE   Result Value Ref Range    POCT Glucose 234 (H) 74 - 99 mg/dL                     Assessment/Plan   Principal Problem:    Syncope, unspecified syncope type    Nuris Squires is a 58 y.o. female with PMHx of T2DM (HbA1c 13 01/2024) c/b neuropathy, HTN, CKD stage III, multiple foot infections s/p recent partial R big toe amputation in which she grew Group B strep and was on 6 week course of ceftriaxone via tunneled line (Serena catheter placed 01/15/24, CTX to be continued through 02/21/24), recent admission for PNA on 4L home O2 that presented on 02/02/24 with altered mental status and syncope. Patient noted to have hypoglycemia and was found to be significantly hypothermic on admission. Of note, patient has had recurrent hypoglycemia since admission and admission blood cultures (4/4) now growing coagulase negative staph, suggesting that bacteremia may be leading to recurrent hypoglycemia. Potential sources include central  "line infection, secondary infection of toe, or potentially urine (patient notably grew VRE in urine culture on 01/31, but this was thought to be an asymptomatic colonization and was never treated).   Currently on vancomycin and ceftriaxone per ID recs for Bcx positive for MRSE and intra-op culture from 1/12 R big toe amputation which grew rare GBS. Will follow up with ID on appropriate antibiotic regimen and whether Serena line can be removed. Blood glucose increased to 150s-250s in the last 24hrs without insulin, only given 2u lispro with dinner. Though blood glucose readings normalizing, will do AM cortisol today to assess for adrenal insufficiency given recent hypoglycemic episodes in the setting of suspected bacteremia. Will restart on 5u of lantus today. Patient endorsing 2-3 day duration of food getting stuck in her stomach and feeling like \"food is not properly digesting.\" Given history of uncontrolled diabetes (last HgA1C 13.1), consider gastroparesis, though recent onset with no prior episodes is a bit unusual. Lipase ordered to r/o pancreatitis given epigastric tenderness, but was low at 5. Will continue to monitor and consider workup if symptoms worsen. Creatinine 1.77 today, up from baseline of 1.2-1.5, concerning for VENKAT iso CKD3. Will give NS bolus today. Satting well on 2L NC today, will try to wean to RA.    Updates 2/6/2024:  - Orthostatics wnl yesterday  - Touch base with ID to confirm abx regimen and to see whether Esrena can be removed  - Blood glucose up to 150s-200s, will continue to hold insulin and monitor for now given recent episodes of hypoglycemia  - Pending AM cortisol (likely tomorrow AM)  - Will give bolus dose of NS today for VENKAT iso CKD3  - Wean off 2L NC  - Encourage IS use 10x/hr  - Lipase low at 5     #Coagulase Negative Staph bacteremia  #s/p R great toe partial amputation  #GBS osteomyelitis  #Hypothermia, resolved  - s/p R great toe partial amputation on 01/12 with podiatry for " source control of infection  - intra-op culture grew rare Group B streptococcus  - R chest tunneled line placed 01/15 for 6 weeks of IV ceftriaxone planned end 02/21/2024  - Echo during last admission 01/25 shows LVEF 70-75%, mildly increased LV septal wall thickness, otherwise unremarkable  - Culture data  - Blood cultures x2 02/03/24, MRSE 4/4 bottles  - ID consulted, appreciate recs  - Switched from Zosyn to Ceftriaxone on 2/4 per ID recs  Plan:  - Repeat blood cultures from 2/5 with no growth at 1 day  - Continue IV Vancomycin (2/3-), ceftriaxone (2/4-)  - Fu recs for abx regimen with ID and touch base about potential Serena line removal     #T2DM c/b neuropathy, CKD III  #Recurrent hypoglycemia  - A1c 13.1% on 01/24/2024  - Home regimen: lantus 70 units, prandial lispro 10 units TID  - Got 50 units Lantus on evening of 2/3 with AM BG 2/4 in the 20's  - Suspect bacteremia is driving hypoglycemia  Plan:  - hold metformin  - hold lantus for now given recent episodes of hypoglycemia       #recent PNA  - discharged home on 4L O2 on 01/31/2024  Plan:  - incentive spirometer   - Wean O2 as tolerated for goal SpO2 > 92%     #HTN  - Continue home clonidine and amlodipine     #DLD  - continue atorvastatin 40 mg  - Continue home aspirin 81 mg (Can discuss stopping with patient as this appears to be for primary prevention)     #VENKAT on CKD Stage III  - baseline Cr around 1.4  - Cr up to 1.77 today  Plan:  - Give bolus dose NS today     Diet: Diabetic 75 carb/meal 45 carb/snack  Access: PIV, tunneled line (placed 01/15/24, likely needs to be removed)  DVT prophylaxis: Lovenox BID  Code status: Full code (confirmed on admission)  NOK: RousseauBrandon (Significant Other)  [phone]         Stefanie Urban, MS-3  Seen and discussed with Dr. Melba Andrew (Resident) and Dr. Austin Nance

## 2024-02-07 LAB
ALBUMIN SERPL BCP-MCNC: 2.9 G/DL (ref 3.4–5)
ANION GAP SERPL CALC-SCNC: 12 MMOL/L (ref 10–20)
APPEARANCE UR: ABNORMAL
BASOPHILS # BLD AUTO: 0.05 X10*3/UL (ref 0–0.1)
BASOPHILS NFR BLD AUTO: 1.1 %
BILIRUB UR STRIP.AUTO-MCNC: NEGATIVE MG/DL
BUN SERPL-MCNC: 20 MG/DL (ref 6–23)
CALCIUM SERPL-MCNC: 8.8 MG/DL (ref 8.6–10.6)
CHLORIDE SERPL-SCNC: 106 MMOL/L (ref 98–107)
CHLORIDE UR-SCNC: 66 MMOL/L
CHLORIDE/CREATININE (MMOL/G) IN URINE: 151 MMOL/G CREAT (ref 38–318)
CO2 SERPL-SCNC: 28 MMOL/L (ref 21–32)
COLOR UR: COLORLESS
CORTIS AM PEAK SERPL-MSCNC: 12.4 UG/DL (ref 5–20)
CREAT SERPL-MCNC: 2.07 MG/DL (ref 0.5–1.05)
CREAT UR-MCNC: 43.7 MG/DL (ref 20–320)
EGFRCR SERPLBLD CKD-EPI 2021: 27 ML/MIN/1.73M*2
EOSINOPHIL # BLD AUTO: 0.17 X10*3/UL (ref 0–0.7)
EOSINOPHIL NFR BLD AUTO: 3.6 %
ERYTHROCYTE [DISTWIDTH] IN BLOOD BY AUTOMATED COUNT: 12.8 % (ref 11.5–14.5)
GLUCOSE BLD MANUAL STRIP-MCNC: 207 MG/DL (ref 74–99)
GLUCOSE BLD MANUAL STRIP-MCNC: 212 MG/DL (ref 74–99)
GLUCOSE BLD MANUAL STRIP-MCNC: 214 MG/DL (ref 74–99)
GLUCOSE BLD MANUAL STRIP-MCNC: 228 MG/DL (ref 74–99)
GLUCOSE SERPL-MCNC: 258 MG/DL (ref 74–99)
GLUCOSE UR STRIP.AUTO-MCNC: ABNORMAL MG/DL
HCT VFR BLD AUTO: 25.3 % (ref 36–46)
HGB BLD-MCNC: 8.5 G/DL (ref 12–16)
IMM GRANULOCYTES # BLD AUTO: 0.01 X10*3/UL (ref 0–0.7)
IMM GRANULOCYTES NFR BLD AUTO: 0.2 % (ref 0–0.9)
KETONES UR STRIP.AUTO-MCNC: NEGATIVE MG/DL
LEUKOCYTE ESTERASE UR QL STRIP.AUTO: ABNORMAL
LYMPHOCYTES # BLD AUTO: 1.79 X10*3/UL (ref 1.2–4.8)
LYMPHOCYTES NFR BLD AUTO: 37.8 %
MAGNESIUM SERPL-MCNC: 2.14 MG/DL (ref 1.6–2.4)
MCH RBC QN AUTO: 30.8 PG (ref 26–34)
MCHC RBC AUTO-ENTMCNC: 33.6 G/DL (ref 32–36)
MCV RBC AUTO: 92 FL (ref 80–100)
MONOCYTES # BLD AUTO: 0.51 X10*3/UL (ref 0.1–1)
MONOCYTES NFR BLD AUTO: 10.8 %
MUCOUS THREADS #/AREA URNS AUTO: ABNORMAL /LPF
NEUTROPHILS # BLD AUTO: 2.21 X10*3/UL (ref 1.2–7.7)
NEUTROPHILS NFR BLD AUTO: 46.5 %
NITRITE UR QL STRIP.AUTO: NEGATIVE
NRBC BLD-RTO: 0 /100 WBCS (ref 0–0)
PH UR STRIP.AUTO: 6.5 [PH]
PHOSPHATE SERPL-MCNC: 3.7 MG/DL (ref 2.5–4.9)
PLATELET # BLD AUTO: 393 X10*3/UL (ref 150–450)
POTASSIUM SERPL-SCNC: 4.1 MMOL/L (ref 3.5–5.3)
POTASSIUM UR-SCNC: 15 MMOL/L
POTASSIUM/CREAT UR-RTO: 34 MMOL/G CREAT
PROT UR STRIP.AUTO-MCNC: ABNORMAL MG/DL
RBC # BLD AUTO: 2.76 X10*6/UL (ref 4–5.2)
RBC # UR STRIP.AUTO: NEGATIVE /UL
RBC #/AREA URNS AUTO: >20 /HPF
SODIUM SERPL-SCNC: 142 MMOL/L (ref 136–145)
SODIUM UR-SCNC: 73 MMOL/L
SODIUM/CREAT UR-RTO: 167 MMOL/G CREAT
SP GR UR STRIP.AUTO: 1.01
SQUAMOUS #/AREA URNS AUTO: ABNORMAL /HPF
TRANS CELLS #/AREA UR COMP ASSIST: ABNORMAL /HPF
UROBILINOGEN UR STRIP.AUTO-MCNC: NORMAL MG/DL
WBC # BLD AUTO: 4.7 X10*3/UL (ref 4.4–11.3)
WBC #/AREA URNS AUTO: >50 /HPF
WBC CLUMPS #/AREA URNS AUTO: ABNORMAL /HPF

## 2024-02-07 PROCEDURE — 82947 ASSAY GLUCOSE BLOOD QUANT: CPT

## 2024-02-07 PROCEDURE — 2060000001 HC INTERMEDIATE ICU ROOM DAILY

## 2024-02-07 PROCEDURE — 2500000004 HC RX 250 GENERAL PHARMACY W/ HCPCS (ALT 636 FOR OP/ED)

## 2024-02-07 PROCEDURE — 82436 ASSAY OF URINE CHLORIDE: CPT

## 2024-02-07 PROCEDURE — 2500000002 HC RX 250 W HCPCS SELF ADMINISTERED DRUGS (ALT 637 FOR MEDICARE OP, ALT 636 FOR OP/ED)

## 2024-02-07 PROCEDURE — 81001 URINALYSIS AUTO W/SCOPE: CPT

## 2024-02-07 PROCEDURE — 99232 SBSQ HOSP IP/OBS MODERATE 35: CPT | Performed by: INTERNAL MEDICINE

## 2024-02-07 PROCEDURE — 80069 RENAL FUNCTION PANEL: CPT

## 2024-02-07 PROCEDURE — 83735 ASSAY OF MAGNESIUM: CPT

## 2024-02-07 PROCEDURE — 87086 URINE CULTURE/COLONY COUNT: CPT

## 2024-02-07 PROCEDURE — 85025 COMPLETE CBC W/AUTO DIFF WBC: CPT

## 2024-02-07 PROCEDURE — 2500000004 HC RX 250 GENERAL PHARMACY W/ HCPCS (ALT 636 FOR OP/ED): Performed by: STUDENT IN AN ORGANIZED HEALTH CARE EDUCATION/TRAINING PROGRAM

## 2024-02-07 PROCEDURE — 99233 SBSQ HOSP IP/OBS HIGH 50: CPT | Performed by: INTERNAL MEDICINE

## 2024-02-07 PROCEDURE — 82533 TOTAL CORTISOL: CPT

## 2024-02-07 PROCEDURE — 2500000001 HC RX 250 WO HCPCS SELF ADMINISTERED DRUGS (ALT 637 FOR MEDICARE OP)

## 2024-02-07 RX ORDER — INSULIN GLARGINE 100 [IU]/ML
5 INJECTION, SOLUTION SUBCUTANEOUS DAILY
Status: DISCONTINUED | OUTPATIENT
Start: 2024-02-07 | End: 2024-02-08

## 2024-02-07 RX ORDER — PANTOPRAZOLE SODIUM 40 MG/1
40 TABLET, DELAYED RELEASE ORAL
Status: DISCONTINUED | OUTPATIENT
Start: 2024-02-08 | End: 2024-02-10 | Stop reason: HOSPADM

## 2024-02-07 RX ORDER — PANTOPRAZOLE SODIUM 40 MG/1
40 TABLET, DELAYED RELEASE ORAL ONCE
Status: COMPLETED | OUTPATIENT
Start: 2024-02-07 | End: 2024-02-07

## 2024-02-07 RX ADMIN — CEFTRIAXONE SODIUM 2 G: 2 INJECTION, SOLUTION INTRAVENOUS at 18:19

## 2024-02-07 RX ADMIN — INSULIN LISPRO 2 UNITS: 100 INJECTION, SOLUTION INTRAVENOUS; SUBCUTANEOUS at 16:04

## 2024-02-07 RX ADMIN — INSULIN LISPRO 2 UNITS: 100 INJECTION, SOLUTION INTRAVENOUS; SUBCUTANEOUS at 19:01

## 2024-02-07 RX ADMIN — AMLODIPINE BESYLATE 10 MG: 10 TABLET ORAL at 09:09

## 2024-02-07 RX ADMIN — CLONIDINE HYDROCHLORIDE 0.1 MG: 0.1 TABLET ORAL at 20:54

## 2024-02-07 RX ADMIN — ENOXAPARIN SODIUM 40 MG: 100 INJECTION SUBCUTANEOUS at 20:54

## 2024-02-07 RX ADMIN — CLONIDINE HYDROCHLORIDE 0.1 MG: 0.1 TABLET ORAL at 09:09

## 2024-02-07 RX ADMIN — Medication 2000 UNITS: at 06:37

## 2024-02-07 RX ADMIN — INSULIN LISPRO 2 UNITS: 100 INJECTION, SOLUTION INTRAVENOUS; SUBCUTANEOUS at 11:12

## 2024-02-07 RX ADMIN — ACETAMINOPHEN 975 MG: 325 TABLET ORAL at 06:45

## 2024-02-07 RX ADMIN — NYSTATIN 1 APPLICATION: 100000 POWDER TOPICAL at 20:55

## 2024-02-07 RX ADMIN — PANTOPRAZOLE SODIUM 40 MG: 40 TABLET, DELAYED RELEASE ORAL at 10:51

## 2024-02-07 RX ADMIN — ATORVASTATIN CALCIUM 40 MG: 40 TABLET, FILM COATED ORAL at 09:09

## 2024-02-07 RX ADMIN — ASPIRIN 81 MG 81 MG: 81 TABLET ORAL at 06:37

## 2024-02-07 RX ADMIN — NYSTATIN 1 APPLICATION: 100000 POWDER TOPICAL at 09:09

## 2024-02-07 RX ADMIN — ENOXAPARIN SODIUM 40 MG: 100 INJECTION SUBCUTANEOUS at 09:10

## 2024-02-07 RX ADMIN — INSULIN GLARGINE 5 UNITS: 100 INJECTION, SOLUTION SUBCUTANEOUS at 11:11

## 2024-02-07 RX ADMIN — Medication 400 MG: at 09:09

## 2024-02-07 RX ADMIN — SODIUM CHLORIDE 1000 ML: 9 INJECTION, SOLUTION INTRAVENOUS at 14:33

## 2024-02-07 ASSESSMENT — COGNITIVE AND FUNCTIONAL STATUS - GENERAL
STANDING UP FROM CHAIR USING ARMS: A LITTLE
MOVING FROM LYING ON BACK TO SITTING ON SIDE OF FLAT BED WITH BEDRAILS: A LITTLE
TURNING FROM BACK TO SIDE WHILE IN FLAT BAD: A LITTLE
TURNING FROM BACK TO SIDE WHILE IN FLAT BAD: A LITTLE
DAILY ACTIVITIY SCORE: 22
WALKING IN HOSPITAL ROOM: A LITTLE
TOILETING: A LITTLE
WALKING IN HOSPITAL ROOM: A LITTLE
CLIMB 3 TO 5 STEPS WITH RAILING: A LITTLE
DRESSING REGULAR LOWER BODY CLOTHING: A LITTLE
CLIMB 3 TO 5 STEPS WITH RAILING: A LITTLE
DRESSING REGULAR LOWER BODY CLOTHING: A LITTLE
MOBILITY SCORE: 18
DAILY ACTIVITIY SCORE: 22
TOILETING: A LITTLE
MOVING TO AND FROM BED TO CHAIR: A LITTLE
STANDING UP FROM CHAIR USING ARMS: A LITTLE
MOVING TO AND FROM BED TO CHAIR: A LITTLE
MOBILITY SCORE: 18
MOVING FROM LYING ON BACK TO SITTING ON SIDE OF FLAT BED WITH BEDRAILS: A LITTLE

## 2024-02-07 ASSESSMENT — PAIN - FUNCTIONAL ASSESSMENT
PAIN_FUNCTIONAL_ASSESSMENT: 0-10

## 2024-02-07 ASSESSMENT — PAIN SCALES - GENERAL
PAINLEVEL_OUTOF10: 0 - NO PAIN
PAINLEVEL_OUTOF10: 0 - NO PAIN
PAINLEVEL_OUTOF10: 2
PAINLEVEL_OUTOF10: 6
PAINLEVEL_OUTOF10: 0 - NO PAIN

## 2024-02-07 ASSESSMENT — PAIN DESCRIPTION - LOCATION: LOCATION: HEAD

## 2024-02-07 NOTE — PROGRESS NOTES
Nuris Squires is a 58 y.o. female on day 4 of admission presenting with Syncope, unspecified syncope type.    Subjective   Patient seen during late afternoon rounds  Patient's friend present  Supine in bed  No acute distress  Denies fever, chills, rigors, night sweats      Objective   Range of Vitals (last 24 hours)  Heart Rate:  [64-73]   Temp:  [36.6 °C (97.9 °F)-36.9 °C (98.4 °F)]   Resp:  [16-19]   BP: (139-140)/(71-74)   SpO2:  [91 %-96 %]   Daily Weight  02/02/24 : 102 kg (225 lb)    Body mass index is 41.15 kg/m².    Physical Exam  Oriented x 3  No acute distress  Anterior chest clear  pristine right upper chest Mediport site  Distant S1 and S2    Right foot:       Dry suture site with exposed suture in the mid first metatarsal area cyst no swelling, erythema or signs of infection at this site  Right great toe: Slightly less edematous today.  No purulence.  New Xeroform and Kerlix dressing reapplied aseptically    Labs  Results from last 72 hours   Lab Units 02/07/24  0422 02/06/24  0735 02/05/24  0446   WBC AUTO x10*3/uL 4.7 5.0 5.4   HEMOGLOBIN g/dL 8.5* 8.1* 8.0*   HEMATOCRIT % 25.3* 24.8* 23.8*   PLATELETS AUTO x10*3/uL 393 390 391   NEUTROS PCT AUTO % 46.5 34.9 45.1   LYMPHS PCT AUTO % 37.8 46.0 37.0   MONOS PCT AUTO % 10.8 12.7 13.1   EOS PCT AUTO % 3.6 4.8 3.5     Results from last 72 hours   Lab Units 02/07/24  0422 02/06/24  0735 02/05/24  0446   SODIUM mmol/L 142 144 144   POTASSIUM mmol/L 4.1 4.3 3.8   CHLORIDE mmol/L 106 108* 107   CO2 mmol/L 28 30 29   BUN mg/dL 20 17 19   CREATININE mg/dL 2.07* 1.77* 1.76*   GLUCOSE mg/dL 258* 143* 71*   CALCIUM mg/dL 8.8 8.5* 8.8   ANION GAP mmol/L 12 10 12   EGFR mL/min/1.73m*2 27* 33* 33*   PHOSPHORUS mg/dL 3.7 4.1 4.6     Results from last 72 hours   Lab Units 02/07/24  0422 02/06/24  0735 02/05/24  0446   ALBUMIN g/dL 2.9* 2.9* 2.8*     Estimated Creatinine Clearance: 32.8 mL/min (A) (by C-G formula based on SCr of 2.07 mg/dL (H)).  C-Reactive Protein    Date Value Ref Range Status   02/05/2024 1.29 (H) <1.00 mg/dL Final   02/03/2024 1.86 (H) <1.00 mg/dL Final   01/23/2024 0.21 <1.00 mg/dL Final     Microbiology  Susceptibility data from last 14 days.  Collected Specimen Info Organism Ampicillin Ciprofloxacin Clindamycin Daptomycin Erythromycin Levofloxacin Linezolid Nitrofurantoin Oxacillin Penicillin Tetracycline Trimethoprim/Sulfamethoxazole Vancomycin   02/03/24 Blood culture from Peripheral Venipuncture Staphylococcus epidermidis   R  R    R  S S S   02/03/24 Blood culture from Peripheral Venipuncture Staphylococcus epidermidis                01/29/24 Urine from Clean Catch/Voided Enterococcus faecium R R  SDD  R S S  R  R R             Assessment/Plan    58 y.o. female with a PMH of T2DM c/b neuropathy, hx of L 1st partial and 2nd toe amputations 2/2 foot infection, recent R 1st toes OM s/p partial hallux amputation (1/12/24, OR Cx positive for GBS and mixed gram positive) and on IV ceftriaxone (plan for 6 weeks until 2/21 per OSH ID), recent episode of suspected PNA  w/ 4L NC O2 requirements (treated with azithromycin and continue ceftriaxone- admission 1/25-1/29), asymptomatic VRE bacteriuria, HTN, and CKD stage III.     NOW admitted after a syncopal episode on 2/2. However, patient has recorded T of 31.2 C. Now, Bcx positive for GPC in clusters/CoNS. Primary team concern for sepsis and VRE UTI. On vancomycin, zosyn and nitrofurantoin. ID consulted for further assessment     02/06/2024 Update:       PICC line site looks pristine.  However had 2 positive blood cultures but with skin organism.  Since patient is a difficult stick might consider that this is a blood draw contaminant.  However coag negative staph can cause PICC line associated bacteremia.  Repeat blood cultures have been negative.  Since site looks fine and other reasons for patient to have had syncope without convincing bloodstream infection (I think the temperature was an error), would  continue maintaining PICC line as recommended by outside physician.  Will continue ceftriaxone through 2/21/2024 and to follow-up with outside infectious diseases (Dr. Hickey at [facility] at [phone].  In absence of osteomyelitis, might not need 6 weeks BUT expect slow recovery and slow wound healing so prefer to complete iv Ceftriaxone and follow up with ID.          1/25/2024 TTE no significant valvular lesion (less likely to have IE)          1/12/2024 Surgical pathology:            A. RIGHT GREAT TOE, EXCISION:                     Gangrene with acute osteomyelitis.            B. BONE, BIOPSY:                     Cortical and cancellous bone, negative for acute osteomyelitis.       02/07/2024 Update       No signs of infection over the right dorsal foot at the first metatarsal.  Has a dry exposed stitch to be removed by surgery during follow-up.       Edematous left great toe amputation site.  No exudate expressed.  Minimal erythema.    ID problems       #  Recent R 1st toe OM s/p partial amputation (1/12)- Plan Ceftriaxone until 2/21/2024         #  Positive Bcx: True bacteremia vs contaminant         #  Asymptomatic bacteriuria        Recommendations  1.  2/6/2024 discontinued vancomycin    2.  Continue ceftriaxone 2 g IV every 24 hours.  Proposed stop date 2/21/2024    3.  Maintain current PICC line    4.  Follow-up with Dr. Hickey at [facility] at [phone].              Please call to schedule appointment in 2 weeks.              Home infusion services should refer questions about PICC line and IV ceftriaxone to Dr. Hickey at [facility] at [phone].     5.  Of course after discharge Dr. Walden can field questions as well if necessary to facilitate transition.      I discussed with the patient and her boyfriend  ID team B will sign off  ID team pager [phone]    I spent 40 minutes in the professional and overall care of this patient.      Scooby Walden MD  ID Staff

## 2024-02-07 NOTE — PROGRESS NOTES
Subjective   Nuris Squires is a 58 y.o. female on hospital day 4 without further episodes of hypoglycemia.  Had mild headache which overall seems to be improving.  Still has some slight sensation of food sticking in epigastric region but denies difficulty eating or drinking and no vomiting.  Also no chest pain/pressure.  Patient denies any suprapubic discomfort but states she has a unusual sensation with urination like she did not completely void    Objective     Exam     Vitals:    02/06/24 1557 02/06/24 1646 02/06/24 2012 02/07/24 0834   BP: 138/66  140/74 140/71   Pulse: 64  73 64   Resp: 16  16 19   Temp: 36.5 °C (97.7 °F)  36.7 °C (98.1 °F) 36.9 °C (98.4 °F)   TempSrc: Temporal      SpO2: 93% 93% 91% 94%   Weight:       Height:          Intake/Output last 3 shifts:  I/O last 3 completed shifts:  In: 800 (7.8 mL/kg) [IV Piggyback:800]  Out: 1500 (14.7 mL/kg) [Urine:1500 (0.4 mL/kg/hr)]  Weight: 102.1 kg     Physical Exam  Vitals reviewed.   Constitutional:       Comments: Very pleasant   HENT:      Head: Normocephalic and atraumatic.      Mouth/Throat:      Mouth: Mucous membranes are moist.   Neck:      Vascular: No carotid bruit.   Cardiovascular:      Rate and Rhythm: Normal rate and regular rhythm.      Pulses: Normal pulses.      Heart sounds: No murmur heard.     No friction rub. No gallop.      Comments: Chest wall tunneled PICC unremarkable  Pulmonary:      Effort: Pulmonary effort is normal.      Breath sounds: Normal breath sounds. No wheezing, rhonchi or rales.   Abdominal:      General: Bowel sounds are normal. There is no distension.      Palpations: Abdomen is soft.      Tenderness: There is abdominal tenderness (Mild epigastric). There is no guarding or rebound.   Musculoskeletal:      Cervical back: Neck supple.      Right lower leg: No edema.      Left lower leg: No edema.      Comments: Right great toe amputation with no significant erythema or tenderness or active drainage expressible from  incision site.  No fluctuant sites   Skin:     General: Skin is warm and dry.      Comments: Candidal rash under pannus and in groin region without signs of further cellulitis   Neurological:      General: No focal deficit present.      Mental Status: She is alert and oriented to person, place, and time.   Psychiatric:         Mood and Affect: Mood normal.         Behavior: Behavior normal.            Medications   amLODIPine, 10 mg, oral, Daily  aspirin, 81 mg, oral, Daily  atorvastatin, 40 mg, oral, Daily  cefTRIAXone, 2 g, intravenous, q24h  cholecalciferol, 2,000 Units, oral, Daily  cloNIDine, 0.1 mg, oral, BID  enoxaparin, 40 mg, subcutaneous, q12h NEHEMIAS  insulin glargine, 5 Units, subcutaneous, Daily  insulin lispro, 0-5 Units, subcutaneous, TID with meals  magnesium oxide, 400 mg, oral, Daily  nystatin, 1 Application, Topical, BID  oxygen, , inhalation, q24h  [START ON 2/8/2024] pantoprazole, 40 mg, oral, Daily before breakfast  sennosides-docusate sodium, 2 tablet, oral, BID       PRN medications: acetaminophen, albuterol, dextrose 10 % in water (D10W), dextrose, glucagon, meclizine, oxygen       Labs     All new labs reviewed:  some of the basic labs as follows -     Results from last 7 days   Lab Units 02/07/24 0422 02/06/24 0735 02/05/24 0446   WBC AUTO x10*3/uL 4.7 5.0 5.4   HEMOGLOBIN g/dL 8.5* 8.1* 8.0*   HEMATOCRIT % 25.3* 24.8* 23.8*   PLATELETS AUTO x10*3/uL 393 390 391   NEUTROS PCT AUTO % 46.5 34.9 45.1   LYMPHS PCT AUTO % 37.8 46.0 37.0   MONOS PCT AUTO % 10.8 12.7 13.1   EOS PCT AUTO % 3.6 4.8 3.5            Results from last 72 hours   Lab Units 02/07/24 0422 02/06/24 0735 02/05/24  0446   SODIUM mmol/L 142 144 144   POTASSIUM mmol/L 4.1 4.3 3.8   CHLORIDE mmol/L 106 108* 107   CO2 mmol/L 28 30 29   BUN mg/dL 20 17 19   CREATININE mg/dL 2.07* 1.77* 1.76*       Results from last 72 hours   Lab Units 02/07/24  0422 02/06/24  0735 02/05/24  0446   ALBUMIN g/dL 2.9* 2.9* 2.8*   LIPASE U/L  --   "5*  --        Results from last 72 hours   Lab Units 02/07/24  0422 02/06/24  0735 02/05/24  0446   GLUCOSE mg/dL 258* 143* 71*       Results from last 72 hours   Lab Units 02/07/24  1127   LEUKOCYTES U  500 Rashi/µL*   NITRITE U  NEGATIVE   WBC UR /HPF >50*   WBC CLUMP UR /HPF OCCASIONAL   RBC UR HPF /HPF >20*   BLOOD UR  NEGATIVE       No results found for: \"TR1\"  Lab Results   Component Value Date    URINECULTURE No significant growth 02/03/2024    BLOODCULT No growth at 2 days 02/05/2024    BLOODCULT No growth at 2 days 02/05/2024    BLOODCULT Staphylococcus epidermidis (AA) 02/03/2024    BLOODCULT Staphylococcus epidermidis (AA) 02/03/2024            Imaging   ECG 12 Lead  Normal sinus rhythm  Possible Anterior infarct (cited on or before 15-ISAK-2024)  Abnormal ECG  When compared with ECG of 24-JAN-2024 20:35,  Premature supraventricular complexes are no longer Present  QRS axis Shifted left  Confirmed by Abhinav Wallace ([id number]) on 2/3/2024 11:09:00 PM  CT head wo IV contrast, CT cervical spine wo IV contrast  Narrative: Interpreted By:  Cheyanne Herrera and Dervishi Mario   STUDY:  CT HEAD WO IV CONTRAST; CT CERVICAL SPINE WO IV CONTRAST;  2/3/2024  12:24 am      INDICATION:  Signs/Symptoms:Unwitnessed fall, altered mental status      COMPARISON:  CT head: 06/27/2023, CT C-spine 01/25/2023      ACCESSION NUMBER(S):  [id number]; [id number]      ORDERING CLINICIAN:  CHEYANNE REYES      TECHNIQUE:  Axial noncontrast CT images of head with coronal and sagittal  reconstructed images. Axial noncontrast CT images of the cervical  spine with coronal and sagittal reconstructed images.      FINDINGS:  CT HEAD:      BRAIN PARENCHYMA: Unchanged calcification of the dentate nuclei and  globus pallidi. No acute intraparenchymal hemorrhage or parenchymal  evidence of acute large territory ischemic infarct. No mass-effect.  Gray-white matter distinction is preserved.      VENTRICLES and EXTRA-AXIAL SPACES:  No acute extra-axial " or  intraventricular hemorrhage. No effacement of cerebral sulci.  Ventricles and sulci are age-concordant.      PARANASAL SINUSES/MASTOIDS:  No hemorrhage or air-fluid levels within  the visualized paranasal sinuses. The mastoids are well aerated.      CALVARIUM/ORBITS:  No skull fracture.  The orbits and globes are  intact to the extent visualized.      EXTRACRANIAL SOFT TISSUES: No discernible abnormality.          CT CERVICAL SPINE:      PREVERTEBRAL SOFT TISSUES: Within normal limits.      CRANIOCERVICAL JUNCTION: Intact.      ALIGNMENT:  No traumatic malalignment or traumatic facet widening.      VERTEBRAE: Vertebral body heights are maintained. No acute fracture.  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  Thick ossification of the anterior longitudinal ligament and bridging  osteophytes at C3-C4 through C6-C7 compatible with DISH.      SPINAL CANAL/INTERVERTEBRAL DISCS: No high-grade spinal canal  stenosis. No significant disc height loss.      NEURAL FORAMINA: Multilevel facet and uncovertebral joint arthropathy  result in mild right C4-C5 foraminal stenosis but no high-grade  foraminal stenosis.      OTHER: None.      Impression: CT HEAD:  1. No acute intracranial abnormality or calvarial fracture.      CT CERVICAL SPINE:  1. No acute fracture or traumatic malalignment of the cervical spine.  2. Redemonstration of DISH without significant canal or foraminal  stenosis.  3. Redemonstration of chronic ununited fracture of the C6 spinous  process.      I personally reviewed the images/study and I agree with the findings  as stated by Resident Yovany Tolliver MD. This study was interpreted  at [place], [place].      MACRO:  None.      Signed by: Mike Herrera 2/3/2024 1:56 AM  Dictation workstation:   [id number]  XR chest 1 view  Narrative: STUDY:  Chest Radiograph;  2/2/2024 11:56 PM  INDICATION:  Fall and altered mental  status.  COMPARISON:  1/30/2024 XR Chest two view  ACCESSION NUMBER(S):  [id number]  ORDERING CLINICIAN:  Mike Nino  TECHNIQUE:  Frontal chest was obtained at 23:56 hours.  FINDINGS:  CARDIOMEDIASTINAL SILHOUETTE:  Cardiomediastinal silhouette is enlarged in size and configuration.   Right IJ central venous catheter is seen with the tip in the upper  SVC.     LUNGS:  Increased interstitial markings are seen bilaterally with airspace  disease at the left lung base and along left cardiac border.  Left  pleural effusion may be present.     ABDOMEN:  No remarkable upper abdominal findings.     BONES:  No acute osseous changes.  Impression: Cardiomegaly with increased interstitial markings bilaterally and left  basilar airspace disease with question of a left effusion.  CHF is a  consideration however left lower lobe pneumonia is suspected in the  appropriate clinical setting.  Signed by Jhon Stanton     No results found for this or any previous visit from the past 1095 days.     Encounter Date: 01/24/24   ECG 12 Lead   Result Value    Ventricular Rate 77    Atrial Rate 77    GA Interval 134    QRS Duration 78    QT Interval 396    QTC Calculation(Bazett) 448    P Axis 39    R Axis 26    T Axis 19    QRS Count 12    Q Onset 222    P Onset 155    P Offset 215    T Offset 420    QTC Fredericia 430    Narrative    Normal sinus rhythm  Possible Anterior infarct (cited on or before 15-ISAK-2024)  Abnormal ECG  When compared with ECG of 24-JAN-2024 20:35,  Premature supraventricular complexes are no longer Present  QRS axis Shifted left  Confirmed by Abhinav Wallace ([id number]) on 2/3/2024 11:09:00 PM        Assessment and Plan     Syncope: Unclear if related to infection versus hypoglycemia versus recent over diuresis.  Orthostatics were negative although multiple days into admission.  Urinalysis is now concerning for UTI which was not the case on presentation despite positive culture  -Continue antibiotics/ceftriaxone per  infectious disease service.  Can DC vancomycin  -Follow-up repeat urine culture  -Follow-up repeat blood cultures.  Remain no growth to date  -Follow-up further infectious disease recommendations.  Determining fate of tunneled central access that she is using for her ceftriaxone administration as outpatient    Cardiovascular:  -Continue clonidine and amlodipine  -Continue statin and aspirin  -Holding home lisinopril    Diabetes mellitus: Blood glucose now running elevated once again.  Last hemoglobin A1c is 13.1.  Required 2 units of sliding scale insulin yesterday  -Favor starting low-dose Lantus this evening i.e. 5 units and titrate per sliding scale insulin needs.  -Continue mild sliding scale  -Hold metformin at this time  -If blood glucose drops again below 50 would ideally draw stat serum glucose, insulin, C-peptide, insulin growth factor and sulfonylurea level as well as consulting endocrinology    Pulmonary:  -Incentive spirometer 10 times an hour while awake  -Wean O2    acute renal failure: Creatinine has risen to 2.07.  Overall baseline appears possibly closer to 1.5.  Possibly related to recent diuresis versus antibiotics versus?  -Monitor renal function panel  -Will give repeat gentle IV bolus.    -Monitor PVR  -check urine electrolytes    Anemia: Hemoglobin stable at 8. 5 today  -Monitor CBC  -Monitor for gross blood losses including in stools    GI: Early satiety although taking p.o. well it appears.  Patient ate 100% of her breakfast that appeared this morning and appeared well without pain/nausea/vomiting.  Possibly gastroparesis although not clear why has been acutely exacerbated in just the past couple days.  Lipase is unremarkable.  -Start PPI.  -Bowel care  -If not improving likely start with abdominal x-ray series.    DVT prophylaxis    Physical therapy/Occupational Therapy when appropriate    Austin Nance MD     Of note the above was done with Dragon dictation system.  Note was  proofread to minimize errors.

## 2024-02-07 NOTE — PROGRESS NOTES
Nuris Squires is a 58 y.o. female on day 4 of admission presenting with Syncope, unspecified syncope type.    Transitional Care Coordination Progress Note:  Patient discussed during interdisciplinary rounds.   Team members present: MD and TCC  Plan per Medical/Surgical team: Stopped Vancomycin, treating VENKAT  Payor: [facility]  Discharge disposition: home with  home care. PCP El aBrlow-  doctor.  Potential Barriers: None  ADOD: 02/08/24    PROSPER DUMONT

## 2024-02-07 NOTE — PROGRESS NOTES
Nuris Squires is a 58 y.o. female on day 3 of admission presenting with Syncope, unspecified syncope type.    Subjective   Patient seen during late afternoon rounds.  Patient's boyfriend present.  Patient supine in bed and in no acute distress  Reviewed history again.  Patient reports that she was seated on the couch and was going to stand to go to the restroom for impending bowel movement.  At that time when she stood at the couch she felt dizzy and then slid to the floor.  Not certain if patient had vasovagal event leading to syncope.  Prior to this the patient had been feeling well.  She did not have any antecedent fever, chills, nausea, vomiting, diarrhea, rash.  Patient was receiving IV ceftriaxone via right upper chest tunneled PICC line placed at [facility] (Dr. Hickey) and was completing 6-week course for right great toe gangrene (stop date 2/21/2024;  Amputation on 1/12/2024)    TODAY patient has completed 25 days of antibiotics following 1/12/2024 amputation.  Slow healing of right amputation site expected with patient's diabetes and probable peripheral vascular disease.  Do not think patient presented with septicemia but does have central line which raises suspicion for coagulase-negative staph associated septicemia.  However history still fevers hemodynamic/syncopal episode.  Current question remains whether patient needs to complete IV therapy as previously recommended.  I reviewed pathology report which showed no osteomyelitis but did show gangrene and mixed gram-positive skin organisms.  Ceftriaxone should be sufficient at this time as recommended by outside practitioner    TODAY patient feels fatigued but has had no fever, chills, rigors, confusion, nausea, vomiting or diarrhea.  Medical team working on diabetes control and blood pressure.    Objective   Range of Vitals (last 24 hours)  Heart Rate:  [64-71]   Temp:  [36.5 °C (97.7 °F)-36.9 °C (98.4 °F)]   Resp:  [16-19]   BP: (120-142)/(55-72)    SpO2:  [93 %-97 %]   Daily Weight  02/02/24 : 102 kg (225 lb)    Body mass index is 41.15 kg/m².    Physical Exam  Patient supine in bed.  Head of bed elevated 20 to 30 degrees.  No acute distress  Pleasant and interactive  Anterior chest clear  S1, S2, I did not hear a murmur  Right upper medial chest tunneled PICC line without erythema, exudate, or tenderness at exit site or along tunnel.  Looks pristine  Right great toe just recently wrapped.  Not examined today  Left dorsal hand peripheral IV      Labs  Results from last 72 hours   Lab Units 02/06/24  0735 02/05/24 0446 02/04/24  0819 02/04/24  0555   WBC AUTO x10*3/uL 5.0 5.4 5.5 8.2   HEMOGLOBIN g/dL 8.1* 8.0* 9.4* 6.2*   HEMATOCRIT % 24.8* 23.8* 29.4* 20.1*   PLATELETS AUTO x10*3/uL 390 391 454* 516*   NEUTROS PCT AUTO % 34.9 45.1  --  45.4   LYMPHS PCT AUTO % 46.0 37.0  --  38.1   MONOS PCT AUTO % 12.7 13.1  --  11.3   EOS PCT AUTO % 4.8 3.5  --  4.1     Results from last 72 hours   Lab Units 02/06/24 0735 02/05/24 0446 02/04/24  0555   SODIUM mmol/L 144 144 144   POTASSIUM mmol/L 4.3 3.8 3.7   CHLORIDE mmol/L 108* 107 107   CO2 mmol/L 30 29 29   BUN mg/dL 17 19 20   CREATININE mg/dL 1.77* 1.76* 1.52*   GLUCOSE mg/dL 143* 71* 27*   CALCIUM mg/dL 8.5* 8.8 9.0   ANION GAP mmol/L 10 12 12   EGFR mL/min/1.73m*2 33* 33* 39*   PHOSPHORUS mg/dL 4.1 4.6 4.3     Results from last 72 hours   Lab Units 02/06/24 0735 02/05/24 0446 02/04/24  0555   ALBUMIN g/dL 2.9* 2.8* 3.1*     Estimated Creatinine Clearance: 38.3 mL/min (A) (by C-G formula based on SCr of 1.77 mg/dL (H)).  C-Reactive Protein   Date Value Ref Range Status   02/05/2024 1.29 (H) <1.00 mg/dL Final   02/03/2024 1.86 (H) <1.00 mg/dL Final   01/23/2024 0.21 <1.00 mg/dL Final     Microbiology  Susceptibility data from last 14 days.  Collected Specimen Info Organism Ampicillin Ciprofloxacin Clindamycin Daptomycin Erythromycin Levofloxacin Linezolid Nitrofurantoin Oxacillin Penicillin Tetracycline  Trimethoprim/Sulfamethoxazole Vancomycin   02/03/24 Blood culture from Peripheral Venipuncture Staphylococcus epidermidis   R  R    R  S S S   02/03/24 Blood culture from Peripheral Venipuncture Staphylococcus epidermidis                01/29/24 Urine from Clean Catch/Voided Enterococcus faecium R R  SDD  R S S  R  R R       Assessment/Plan   58 y.o. female with a PMH of T2DM c/b neuropathy, hx of L 1st partial and 2nd toe amputations 2/2 foot infection, recent R 1st toes OM s/p partial hallux amputation (1/12/24, OR Cx positive for GBS and mixed gram positive) and on IV ceftriaxone (plan for 6 weeks until 2/21 per OSH ID), recent episode of suspected PNA  w/ 4L NC O2 requirements (treated with azithromycin and continue ceftriaxone- admission 1/25-1/29), asymptomatic VRE bacteriuria, HTN, and CKD stage III.    NOW admitted after a syncopal episode on 2/2. However, patient has recorded T of 31.2 C. Now, Bcx positive for GPC in clusters/CoNS. Primary team concern for sepsis and VRE UTI. On vancomycin, zosyn and nitrofurantoin. ID consulted for further assessment    02/06/2024 Update:       PICC line site looks pristine.  However had 2 positive blood cultures but with skin organism.  Since patient is a difficult stick might consider that this is a blood draw contaminant.  However coag negative staph can cause PICC line associated bacteremia.  Repeat blood cultures have been negative.  Since site looks fine and other reasons for patient to have had syncope without convincing bloodstream infection (I think the temperature was an error), would continue maintaining PICC line as recommended by outside physician.  Will continue ceftriaxone through 2/21/2024 and to follow-up with outside infectious diseases (Dr. Hickey at [facility] at [phone].  In absence of osteomyelitis, might not need 6 weeks BUT expect slow recovery and slow wound healing so prefer to complete iv Ceftriaxone and follow up with ID.           1/25/2024 TTE no significant valvular lesion (less likely to have IE)         1/12/2024 Surgical pathology:            A. RIGHT GREAT TOE, EXCISION:                     Gangrene with acute osteomyelitis.            B. BONE, BIOPSY:                     Cortical and cancellous bone, negative for acute osteomyelitis.     ID problems  #Recent R 1st toe OM s/p partial amputation (1/12)- Plan Ceftriaxone until 2/21/2024  #Positive Bcx: True bacteremia vs contaminant  #Asymptomatic bacteriuria       Recommendations  1.  Discontinue vancomycin.  2.  Continue ceftriaxone 2 g IV every 24 hours.  Proposed stop date 2/21/2024  3.  Maintain current PICC line  4.  Follow-up with Dr. Hickey at [facility] at [phone].              Please call to schedule appointment in 2 weeks.              Home infusion services should refer questions about PICC line and IV ceftriaxone to Dr. Hickey at [facility] at [phone].    5.  Of course after discharge Dr. Walden can field questions as well if necessary to facilitate transition.        ID will follow up    I spent 60 minutes in the professional and overall care of this patient.      Scooby Walden MD

## 2024-02-07 NOTE — PROGRESS NOTES
"Nuris Squires is a 58 y.o. female on day 4 of admission presenting with Syncope, unspecified syncope type.    Subjective   NAEON. Endorsing a headache with throbbing sensation in the center of her forehead, but improved with tylenol. Also endorsing some chills over the past hour this AM, but endorsed no subjective fever or night sweats. Afebrile and HDS on repeat vitals this AM. Still feeling like food is stuck in her stomach and not properly digesting similar to yesterday, but no N/V or changes in BM. States that she's been hydrating well and has had no trouble with urination.      Objective     Physical Exam  Cardiovascular:      Rate and Rhythm: Normal rate and regular rhythm.   Pulmonary:      Breath sounds: Normal breath sounds.   Abdominal:      General: Abdomen is flat. Bowel sounds are normal.      Palpations: Abdomen is soft.      Tenderness: There is abdominal tenderness in the epigastric area.   Musculoskeletal:      Comments: R big toe amputation and L second toe amputation. Feet appear dry and flaky, but with no erythema, tenderness or purulence.     Last Recorded Vitals  Blood pressure 140/74, pulse 73, temperature 36.7 °C (98.1 °F), resp. rate 16, height 1.575 m (5' 2\"), weight 102 kg (225 lb), SpO2 91 %.  Intake/Output last 3 Shifts:  I/O last 3 completed shifts:  In: 800 (7.8 mL/kg) [IV Piggyback:800]  Out: 1500 (14.7 mL/kg) [Urine:1500 (0.4 mL/kg/hr)]  Weight: 102.1 kg     Relevant Results     Scheduled medications  amLODIPine, 10 mg, oral, Daily  aspirin, 81 mg, oral, Daily  atorvastatin, 40 mg, oral, Daily  cefTRIAXone, 2 g, intravenous, q24h  cholecalciferol, 2,000 Units, oral, Daily  cloNIDine, 0.1 mg, oral, BID  enoxaparin, 40 mg, subcutaneous, q12h NEHEMIAS  insulin lispro, 0-5 Units, subcutaneous, TID with meals  magnesium oxide, 400 mg, oral, Daily  nystatin, 1 Application, Topical, BID  oxygen, , inhalation, q24h  sennosides-docusate sodium, 2 tablet, oral, BID      Continuous medications   "   PRN medications  PRN medications: acetaminophen, albuterol, dextrose 10 % in water (D10W), dextrose, glucagon, meclizine, oxygen    Results for orders placed or performed during the hospital encounter of 02/02/24 (from the past 24 hour(s))   POCT GLUCOSE   Result Value Ref Range    POCT Glucose 148 (H) 74 - 99 mg/dL   POCT GLUCOSE   Result Value Ref Range    POCT Glucose 164 (H) 74 - 99 mg/dL   Vancomycin, Trough   Result Value Ref Range    Vancomycin, Trough 19.3 5.0 - 20.0 ug/mL   POCT GLUCOSE   Result Value Ref Range    POCT Glucose 195 (H) 74 - 99 mg/dL   POCT GLUCOSE   Result Value Ref Range    POCT Glucose 279 (H) 74 - 99 mg/dL   CBC and Auto Differential   Result Value Ref Range    WBC 4.7 4.4 - 11.3 x10*3/uL    nRBC 0.0 0.0 - 0.0 /100 WBCs    RBC 2.76 (L) 4.00 - 5.20 x10*6/uL    Hemoglobin 8.5 (L) 12.0 - 16.0 g/dL    Hematocrit 25.3 (L) 36.0 - 46.0 %    MCV 92 80 - 100 fL    MCH 30.8 26.0 - 34.0 pg    MCHC 33.6 32.0 - 36.0 g/dL    RDW 12.8 11.5 - 14.5 %    Platelets 393 150 - 450 x10*3/uL    Neutrophils % 46.5 40.0 - 80.0 %    Immature Granulocytes %, Automated 0.2 0.0 - 0.9 %    Lymphocytes % 37.8 13.0 - 44.0 %    Monocytes % 10.8 2.0 - 10.0 %    Eosinophils % 3.6 0.0 - 6.0 %    Basophils % 1.1 0.0 - 2.0 %    Neutrophils Absolute 2.21 1.20 - 7.70 x10*3/uL    Immature Granulocytes Absolute, Automated 0.01 0.00 - 0.70 x10*3/uL    Lymphocytes Absolute 1.79 1.20 - 4.80 x10*3/uL    Monocytes Absolute 0.51 0.10 - 1.00 x10*3/uL    Eosinophils Absolute 0.17 0.00 - 0.70 x10*3/uL    Basophils Absolute 0.05 0.00 - 0.10 x10*3/uL   Renal function panel   Result Value Ref Range    Glucose 258 (H) 74 - 99 mg/dL    Sodium 142 136 - 145 mmol/L    Potassium 4.1 3.5 - 5.3 mmol/L    Chloride 106 98 - 107 mmol/L    Bicarbonate 28 21 - 32 mmol/L    Anion Gap 12 10 - 20 mmol/L    Urea Nitrogen 20 6 - 23 mg/dL    Creatinine 2.07 (H) 0.50 - 1.05 mg/dL    eGFR 27 (L) >60 mL/min/1.73m*2    Calcium 8.8 8.6 - 10.6 mg/dL    Phosphorus  3.7 2.5 - 4.9 mg/dL    Albumin 2.9 (L) 3.4 - 5.0 g/dL   Magnesium   Result Value Ref Range    Magnesium 2.14 1.60 - 2.40 mg/dL   Cortisol AM   Result Value Ref Range    Cortisol  A.M. 12.4 5.0 - 20.0 ug/dL     ECG 12 Lead    Result Date: 2/3/2024  Normal sinus rhythm Possible Anterior infarct (cited on or before 15-ISAK-2024) Abnormal ECG When compared with ECG of 24-JAN-2024 20:35, Premature supraventricular complexes are no longer Present QRS axis Shifted left Confirmed by Abhinav Wallace ([id number]) on 2/3/2024 11:09:00 PM    CT head wo IV contrast    Result Date: 2/3/2024  Interpreted By:  Cheyanne Herrera,  and Sharee Pedroza STUDY: CT HEAD WO IV CONTRAST; CT CERVICAL SPINE WO IV CONTRAST;  2/3/2024 12:24 am   INDICATION: Signs/Symptoms:Unwitnessed fall, altered mental status   COMPARISON: CT head: 06/27/2023, CT C-spine 01/25/2023   ACCESSION NUMBER(S): [id number]; [id number]   ORDERING CLINICIAN: CHEYANNE REYES   TECHNIQUE: Axial noncontrast CT images of head with coronal and sagittal reconstructed images. Axial noncontrast CT images of the cervical spine with coronal and sagittal reconstructed images.   FINDINGS: CT HEAD:   BRAIN PARENCHYMA: Unchanged calcification of the dentate nuclei and globus pallidi. No acute intraparenchymal hemorrhage or parenchymal evidence of acute large territory ischemic infarct. No mass-effect. Gray-white matter distinction is preserved.   VENTRICLES and EXTRA-AXIAL SPACES:  No acute extra-axial or intraventricular hemorrhage. No effacement of cerebral sulci. Ventricles and sulci are age-concordant.   PARANASAL SINUSES/MASTOIDS:  No hemorrhage or air-fluid levels within the visualized paranasal sinuses. The mastoids are well aerated.   CALVARIUM/ORBITS:  No skull fracture.  The orbits and globes are intact to the extent visualized.   EXTRACRANIAL SOFT TISSUES: No discernible abnormality.     CT CERVICAL SPINE:   PREVERTEBRAL SOFT TISSUES: Within normal limits.   CRANIOCERVICAL  JUNCTION: Intact.   ALIGNMENT:  No traumatic malalignment or traumatic facet widening.   VERTEBRAE: Vertebral body heights are maintained. No acute fracture. 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 Thick ossification of the anterior longitudinal ligament and bridging osteophytes at C3-C4 through C6-C7 compatible with DISH.   SPINAL CANAL/INTERVERTEBRAL DISCS: No high-grade spinal canal stenosis. No significant disc height loss.   NEURAL FORAMINA: Multilevel facet and uncovertebral joint arthropathy result in mild right C4-C5 foraminal stenosis but no high-grade foraminal stenosis.   OTHER: None.       CT HEAD: 1. No acute intracranial abnormality or calvarial fracture.   CT CERVICAL SPINE: 1. No acute fracture or traumatic malalignment of the cervical spine. 2. Redemonstration of DISH without significant canal or foraminal stenosis. 3. Redemonstration of chronic ununited fracture of the C6 spinous process.   I personally reviewed the images/study and I agree with the findings as stated by Resident Yovany Tolliver MD. This study was interpreted at [place], [place].   MACRO: None.   Signed by: Cheyanne Herrera 2/3/2024 1:56 AM Dictation workstation:   [id number]    CT cervical spine wo IV contrast    Result Date: 2/3/2024  Interpreted By:  Cheyanne Herrera and Dervishi Mario STUDY: CT HEAD WO IV CONTRAST; CT CERVICAL SPINE WO IV CONTRAST;  2/3/2024 12:24 am   INDICATION: Signs/Symptoms:Unwitnessed fall, altered mental status   COMPARISON: CT head: 06/27/2023, CT C-spine 01/25/2023   ACCESSION NUMBER(S): [id number]; [id number]   ORDERING CLINICIAN: CHEYANNE REYES   TECHNIQUE: Axial noncontrast CT images of head with coronal and sagittal reconstructed images. Axial noncontrast CT images of the cervical spine with coronal and sagittal reconstructed images.   FINDINGS: CT HEAD:   BRAIN PARENCHYMA: Unchanged calcification of the dentate nuclei and  globus pallidi. No acute intraparenchymal hemorrhage or parenchymal evidence of acute large territory ischemic infarct. No mass-effect. Gray-white matter distinction is preserved.   VENTRICLES and EXTRA-AXIAL SPACES:  No acute extra-axial or intraventricular hemorrhage. No effacement of cerebral sulci. Ventricles and sulci are age-concordant.   PARANASAL SINUSES/MASTOIDS:  No hemorrhage or air-fluid levels within the visualized paranasal sinuses. The mastoids are well aerated.   CALVARIUM/ORBITS:  No skull fracture.  The orbits and globes are intact to the extent visualized.   EXTRACRANIAL SOFT TISSUES: No discernible abnormality.     CT CERVICAL SPINE:   PREVERTEBRAL SOFT TISSUES: Within normal limits.   CRANIOCERVICAL JUNCTION: Intact.   ALIGNMENT:  No traumatic malalignment or traumatic facet widening.   VERTEBRAE: Vertebral body heights are maintained. No acute fracture. 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 Thick ossification of the anterior longitudinal ligament and bridging osteophytes at C3-C4 through C6-C7 compatible with DISH.   SPINAL CANAL/INTERVERTEBRAL DISCS: No high-grade spinal canal stenosis. No significant disc height loss.   NEURAL FORAMINA: Multilevel facet and uncovertebral joint arthropathy result in mild right C4-C5 foraminal stenosis but no high-grade foraminal stenosis.   OTHER: None.       CT HEAD: 1. No acute intracranial abnormality or calvarial fracture.   CT CERVICAL SPINE: 1. No acute fracture or traumatic malalignment of the cervical spine. 2. Redemonstration of DISH without significant canal or foraminal stenosis. 3. Redemonstration of chronic ununited fracture of the C6 spinous process.   I personally reviewed the images/study and I agree with the findings as stated by Resident Yovany Tolliver MD. This study was interpreted at [place], [place].   MACRO: None.   Signed by: Mike Herrera 2/3/2024 1:56 AM  Dictation workstation:   [id number]    XR chest 1 view    Result Date: 2/3/2024  STUDY: Chest Radiograph;  2/2/2024 11:56 PM INDICATION: Fall and altered mental status. COMPARISON: 1/30/2024 XR Chest two view ACCESSION NUMBER(S): [id number] ORDERING CLINICIAN: Mike Nino TECHNIQUE:  Frontal chest was obtained at 23:56 hours. FINDINGS: CARDIOMEDIASTINAL SILHOUETTE: Cardiomediastinal silhouette is enlarged in size and configuration. Right IJ central venous catheter is seen with the tip in the upper SVC.  LUNGS: Increased interstitial markings are seen bilaterally with airspace disease at the left lung base and along left cardiac border.  Left pleural effusion may be present.  ABDOMEN: No remarkable upper abdominal findings.  BONES: No acute osseous changes.    Cardiomegaly with increased interstitial markings bilaterally and left basilar airspace disease with question of a left effusion.  CHF is a consideration however left lower lobe pneumonia is suspected in the appropriate clinical setting. Signed by Jhon Stanton    XR chest 2 views    Result Date: 1/30/2024  Interpreted By:  Luis Taveras and Ritchie Brandon STUDY: XR CHEST 2 VIEWS;  1/30/2024 3:24 pm   INDICATION: Signs/Symptoms:evaluate for effusion.   COMPARISON: Chest x-ray 01/26/2024   ACCESSION NUMBER(S): [id number]   ORDERING CLINICIAN: MARTHA REIS   FINDINGS: PA and lateral radiographs of the chest were provided.  Additional PA dual energy images were also provided.   Right internal jugular central venous catheter tip projects over the expected location of the right brachiocephalic vein.   CARDIOMEDIASTINAL SILHOUETTE: Cardiomediastinal silhouette is enlarged but stable in size and configuration.   LUNGS: Expiratory radiograph/low lung volumes are again contributing to bronchovascular crowding. Mildly improved hazy consolidation of the left upper lung zone consistent with known airspace disease seen on prior CT of the chest from 01/25/2024.  Compared to prior chest x-ray from 2024, there is improving bilateral pleural effusions and bibasilar atelectasis.   ABDOMEN: No remarkable upper abdominal findings.   BONES: No acute osseous changes.       1. Mildly improving bilateral pleural effusions, left-greater-than-right with adjacent bibasilar atelectasis. Continued follow-up to resolution recommended. 2. Mildly improved hazy consolidation involving the left upper lung zone consistent with prior CT of the chest from 2024 and likely suggestive of infectious process. 3. Similar cardiomegaly   I personally reviewed the images/study and I agree with the findings as stated by resident Miguel Angel Ortiz. This study was interpreted at [place], [place].   MACRO: None   Signed by: Luis Taveras 2024 8:09 PM Dictation workstation:   [id number]    Thoracentesis    Result Date: 2024  Chest Ultrasound Note DATE OF SERVICE: 2024   PROCEDURE LOCATION: [facility] PATIENT IDENTIFIERS: Nuris Squires : 1965 MRN: [id number]   REFERRING PROVIDER: PRE-PROCEDURE DIAGNOSIS:  1. Bilateral pleural effusions  POST-PROCEDURE DIAGNOSES:  1. Bilateral pleural effusions   INDICATION: 1. Bilateral pleural effusions  PROCEDURE(S) PERFORMED:   Thoracic ultrasound - Bilateral STUDY PERFORMED BY: Sindi Green MD, Silvio Early MD IMAGES SAVED: Scanned to Media, uploaded to EMR LATERALITY: Thoracic ultrasound -  Bilateral  POSITIONING: _x_ Seated __ Lateral decubitus __ Recumbent PRESENCE OF FLUID: Yes  SLIDING: Present QUANTIFICATION OF FLUID: Trace on the Right, Small on the Let FLUID CHARACTERISTICS: Anechoic PLEURAL FLUID COMPLEXITY: None DIAPHRAGMATIC EXCURSION - RIGHT: Good DIAPHRAGMATIC EXCURSION - LEFT: Good  COMMENTS: Given concern for potential pleural effusion, the patient underwent bilateral chest ultrasound. Patient was examined by chest ultrasound in the seated position,  using a phased array probe. Gravity dependant areas along the posterior hemithorax starting from the para spine to the mid axilla were analyzed looking at the sub-scapular space, posterior axilla and mid axilla.  FINDINGS: 1. Trace Right sided pleural Effusion 2. Small Left sided pleural Effusion 3. Given small amounts of fluid in bilateral pleural spaces and improved symptoms, thoracentesis was not performed. Sindi Green MD 01/29/24 ============= I was present for the entirety of the procedure(s). Silvio Early MD     Vascular US Lower Extremity Venous Duplex Bilateral    Result Date: 1/29/2024            [place] [address]   Tel [phone] and Fax [phone]  Vascular Lab Report VASC US LOWER EXTREMITY VENOUS DUPLEX BILATERAL  Patient Name:     ALBERT RIDLEY        Reading           [id number] Silvio Zimmerman                                       Physician:        MD Study Date:       1/29/2024           Ordering          [id number] EFREN BLACKMON                                       Physician: MRN/PID:          [id number]            Technologist:     Jeannie Campbell T Accession#:       [id number]        Technologist 2: Date of           1965 / 58      Encounter#:       [id number] Birth/Age:        years Gender:           F Admission Status: Inpatient           Location          [facility]                                       Performed:  Diagnosis/ICD: Other specified soft tissue disorders-M79.89 CPT Codes:     15802 Peripheral venous duplex scan for DVT complete  CONCLUSIONS: No DVT identified  Imaging & Doppler Findings:  Right                 Compressible Thrombus        Flow Distal External Iliac                None       Pulsatile CFV                       Yes        None   Spontaneous/Phasic PFV                       Yes        None FV Proximal               Yes        None   Spontaneous/Phasic FV Mid                    Yes        None  FV Distal                 Yes        None Popliteal                 Yes        None   Spontaneous/Phasic Peroneal                  Yes        None PTV                       Yes        None  Left                  Compress Thrombus        Flow Distal External Iliac            None       Pulsatile CFV                     Yes      None   Spontaneous/Phasic PFV                     Yes      None FV Proximal             Yes      None   Spontaneous/Phasic FV Mid                  Yes      None FV Distal               Yes      None Popliteal               Yes      None   Spontaneous/Phasic Peroneal                Yes      None PTV                     Yes      None  [id number] Silvio Zimmerman MD Electronically signed by [id number] Silvio Zimmerman MD on 1/29/2024 at 4:33:49 PM  ** Final **     XR chest 1 view    Result Date: 1/26/2024  Interpreted By:  Sherita Ansari and Liller Gregory STUDY: XR CHEST 1 VIEW;  1/26/2024 11:39 am   INDICATION: Signs/Symptoms:o2 req.   COMPARISON: Chest radiograph 01/24/2024 and CT chest 01/25/2024.   ACCESSION NUMBER(S): [id number]   ORDERING CLINICIAN: ANA LILIA GEORGE   FINDINGS: AP radiograph of the chest was provided.   Right internal jugular central venous catheter tip projects over the expected location of the right brachiocephalic vein.   CARDIOMEDIASTINAL SILHOUETTE: Cardiomediastinal silhouette is enlarged.   LUNGS: Low lung volumes contributing to bronchovascular crowding. Hazy opacities in the left upper lung zone compatible with known pneumonic infiltrates as noted on the previous CT chest dated 01/25/2024. Additional opacification of the bilateral lower lung zones likely correlates with known effusions and atelectasis. No pneumothorax.   ABDOMEN: No remarkable upper abdominal findings.   BONES: No osseous injury.       1. Persistent findings of left upper lobe pneumonia. 2. Redemonstration of bilateral lower lobe effusions and atelectasis. 3. Cardiomegaly.     I personally reviewed the  images/study and I agree with the findings as stated above by resident physician, Dr. Rubens Lowe. The study was interpreted at [facility] in [facility].   MACRO: none.   Signed by: Sherita Ansari 1/26/2024 3:03 PM Dictation workstation:   [id number]    Transthoracic Echo (TTE) Complete    Result Date: 1/25/2024   [facility], [address] [place] [address]                Tel [phone] and Fax [phone] TRANSTHORACIC ECHOCARDIOGRAM REPORT  Patient Name:      ALBERT RIDLEY      Reading Physician:   [id number] Payam Salazar MD Study Date:        1/25/2024           Ordering Provider:   [id number] EFREN BLACKMON MRN/PID:           [id number]            Fellow: Accession#:        [id number]        Nurse: Date of Birth/Age: 1965 / 58      Sonographer:         Ainsley Hong RDCS                    years Gender:            F                   Additional Staff: Height:            157.48 cm           Admit Date:          1/24/2024 Weight:            102.06 kg           Admission Status:    Inpatient - Routine BSA:               2.01 m2             Encounter#:          [id number]                                        [address] Blood Pressure: 172 /105 mmHg Study Type:    TRANSTHORACIC ECHO (TTE) COMPLETE Diagnosis/ICD: Pleural Effusion-J90 Indication:    pleural effusion, HF d/t valvular disease, acute, diastolic CPT Code:      Echo Complete w Full Doppler-06114 Patient History: Pertinent History: SOB, HTN, HLD, PAD, Sickle-Cell, DM II. Study Detail: The following Echo studies were performed: 2D, M-Mode, Doppler and               color flow. Technically challenging study due to poor acoustic               windows, prominent lung artifact, patient lying in supine position               and body habitus. Definity used as a contrast agent for               endocardial  border definition. Total contrast used for this               procedure was 1 mL via IV push. Unable to obtain suprasternal               notch view.  PHYSICIAN INTERPRETATION: Left Ventricle: The left ventricular systolic function is hyperdynamic, with an estimated ejection fraction of 70-75%. There are no regional wall motion abnormalities. The left ventricular cavity size is normal. The left ventricular septal wall thickness is mildly increased. There is mild concentric left ventricular hypertrophy. Spectral Doppler shows a normal pattern of left ventricular diastolic filling. Left Atrium: The left atrium is mildly dilated. Right Ventricle: The right ventricle was not well visualized. There is normal right ventricular global systolic function. Right Atrium: The right atrium is normal in size. Aortic Valve: The aortic valve is trileaflet. There is no evidence of aortic valve regurgitation. The peak instantaneous gradient of the aortic valve is 10.0 mmHg. The mean gradient of the aortic valve is 5.0 mmHg. Mitral Valve: The mitral valve is normal in structure. There is trace to mild mitral valve regurgitation. Tricuspid Valve: The tricuspid valve is structurally normal. There is trace to mild tricuspid regurgitation. The Doppler estimated RVSP is within normal limits at 32.4 mmHg. Pulmonic Valve: The pulmonic valve is structurally normal. There is physiologic pulmonic valve regurgitation. Pericardium: There is a trivial pericardial effusion. Pleural: There is left pleural effusion. Aorta: The aortic root is normal.  CONCLUSIONS:  1. Left ventricular systolic function is hyperdynamic with a 70-75% estimated ejection fraction.  2. RVSP within normal limits. QUANTITATIVE DATA SUMMARY: 2D MEASUREMENTS:                           Normal Ranges: Ao Root d:     2.90 cm    (2.0-3.7cm) LAs:           3.35 cm    (2.7-4.0cm) IVSd:          1.25 cm    (0.6-1.1cm) LVPWd:         1.05 cm    (0.6-1.1cm) LVIDd:         4.35 cm     (3.9-5.9cm) LVIDs:         2.85 cm LV Mass Index: 107.0 g/m2 LV % FS        34.5 % LA VOLUME:                               Normal Ranges: LA Vol A4C:        77.5 ml    (22+/-6mL/m2) LA Vol A2C:        132.4 ml LA Vol BP:         101.5 ml LA Vol Index A4C:  38.6ml/m2 LA Vol Index A2C:  65.9 ml/m2 LA Vol Index BP:   50.5 ml/m2 LA Area A4C:       23.7 cm2 LA Area A2C:       30.9 cm2 LA Major Axis A4C: 6.2 cm LA Major Axis A2C: 6.1 cm LA Volume Index:   50.5 ml/m2 AORTA MEASUREMENTS:                    Normal Ranges: Asc Ao, d: 3.20 cm (2.1-3.4cm) LV DIASTOLIC FUNCTION:                            Normal Ranges: MV Peak E:      0.88 m/s   (0.7-1.2 m/s) MV Peak A:      1.10 m/s   (0.42-0.7 m/s) E/A Ratio:      0.80       (1.0-2.2) MV e'           0.08 m/s   (>8.0) MV lateral e'   0.08 m/s MV medial e'    0.08 m/s E/e' Ratio:     10.94      (<8.0) a'              0.10 m/s MV DT:          174 msec   (150-240 msec) PulmV Sys Molina:  55.10 cm/s PulmV Robles Molina: 39.00 cm/s PulmV S/D Molina:  1.40 MITRAL VALVE:                 Normal Ranges: MV DT: 174 msec (150-240msec) AORTIC VALVE:                                    Normal Ranges: AoV Vmax:                1.58 m/s  (<=1.7m/s) AoV Peak PG:             10.0 mmHg (<20mmHg) AoV Mean P.0 mmHg  (1.7-11.5mmHg) LVOT Max Molina:            1.31 m/s  (<=1.1m/s) AoV VTI:                 30.40 cm  (18-25cm) LVOT VTI:                25.90 cm LVOT Diameter:           1.90 cm   (1.8-2.4cm) AoV Area, VTI:           2.42 cm2  (2.5-5.5cm2) AoV Area,Vmax:           2.35 cm2  (2.5-4.5cm2) AoV Dimensionless Index: 0.85  RIGHT VENTRICLE: TAPSE: 19.9 mm RV s'  0.10 m/s TRICUSPID VALVE/RVSP:                             Normal Ranges: Peak TR Velocity: 2.71 m/s RV Syst Pressure: 32.4 mmHg (< 30mmHg) IVC Diam:         1.80 cm PULMONIC VALVE:                         Normal Ranges: PV Accel Time: 107 msec (>120ms) PV Max Molina:    1.0 m/s  (0.6-0.9m/s) PV Max P.2 mmHg Pulmonary Veins:  PulmV Robles Molina: 39.00 cm/s PulmV S/D Molina:  1.40 PulmV Sys Molina:  55.10 cm/s  [id number] Payam Salazar MD Electronically signed on 1/25/2024 at 12:51:30 PM  ** Final **     CT angio chest for pulmonary embolism    Result Date: 1/25/2024  Interpreted By:  Sean Vaughn and Dervishi Mario STUDY: CT ANGIO CHEST FOR PULMONARY EMBOLISM;  1/25/2024 4:16 am   INDICATION: Signs/Symptoms:hypoxia, sob.   COMPARISON: Ultrasound thyroid: 01/27/2023   ACCESSION NUMBER(S): [id number]   ORDERING CLINICIAN: MAXIME RICHARDS   TECHNIQUE: Helical data acquisition of the chest was obtained after intravenous administration of 88 mL of Omnipaque 350, as per PE protocol. Images were reformatted in coronal and sagittal planes. Axial and coronal maximum intensity projection (MIP) images were created and reviewed.   FINDINGS: POTENTIAL LIMITATIONS OF THE STUDY: The assessment is limited by respiratory motion and suboptimal contrast opacification of pulmonary arteries.   HEART AND VESSELS: No discrete filling defects within the main pulmonary artery or its branches to  proximal segmental level. Please note that, assessment of distal segmental and subsegmental branches is limited and small peripheral emboli are not entirely excluded. Main pulmonary artery and its branches are normal in caliber.   The thoracic aorta normal in course and caliber.No significant atherosclerotic changes.No acute aortic pathology. No coronary artery calcifications are seen. Please note, the study is not optimized for evaluation of coronary arteries.   The mild cardiomegaly..There are no findings to suggest right heart strain.   There is no pericardial effusion seen.   MEDIASTINUM AND WESLEY, LOWER NECK AND AXILLA: There is a left thyroid lobe nodule measuring 1.5 cm in largest diameter. No significant abnormality of the right thyroid lobe. No evidence of thoracic lymphadenopathy by CT criteria. Esophagus appears within normal limits as seen.   LUNGS AND AIRWAYS: The  trachea and central airways are patent. No endobronchial lesion is seen.   There is large bilateral pleural effusions with compressive atelectasis. There is consolidative opacities and surrounding ground-glass opacities in the left upper lobe. No pneumothorax.   UPPER ABDOMEN: The visualized subdiaphragmatic structures demonstrate no remarkable findings.     CHEST WALL AND OSSEOUS STRUCTURES: Chest wall is within normal limits. No acute osseous pathology.There are no suspicious osseous lesions.Mild multilevel degenerative changes within visualized spine.       1. No evidence of acute pulmonary embolism to segmental level. Please note that, assessment of subsegmental branches is limited and small peripheral emboli are not entirely excluded. 2. Cardiomegaly and large bilateral pleural effusion and bilateral atelectatic/consolidative opacities and also groundglass opacities and septal thickening compatible with edema. There is also evidence of asymmetric left upper lobe consolidative opacity concerning for superimposed pneumonia.   3.      Stable 1.5 cm left thyroid lobe nodule previously evaluated on 1/27/2023 thyroid ultrasound examination.   I personally reviewed the images/study and I agree with the findings as stated by Resident Yovany Tolliver MD. This study was interpreted at [place], [place].   MACRO: None   Signed by: Sean Vaughn 1/25/2024 4:34 AM Dictation workstation:   [id number]    ECG 12 lead    Result Date: 1/24/2024  Suspect arm lead reversal, interpretation assumes no reversal Sinus tachycardia with Premature supraventricular complexes Left atrial enlargement Right axis deviation Low voltage QRS Cannot rule out Anterior infarct (cited on or before 15-ISAK-2024) Abnormal ECG When compared with ECG of 15-ISAK-2024 02:33, Premature supraventricular complexes are now Present QRS axis Shifted right See ED provider note for full interpretation and clinical  correlation Confirmed by Kaye Jay ([id number]) on 1/24/2024 11:57:12 PM    XR chest 1 view    Result Date: 1/24/2024  Interpreted By:  Damien Fulton and Barbat Antonio STUDY: XR CHEST 1 VIEW;  1/24/2024 8:47 pm   INDICATION: Signs/Symptoms:sob.   COMPARISON: Chest radiograph dated 06/27/2023. CT abdomen dated 08/13/2022.   ACCESSION NUMBER(S): [id number]   ORDERING CLINICIAN: RICH BECKMAN   FINDINGS: AP radiograph of the chest was provided.   Examination is limited due to overlying material projecting over the thorax and due to underpenetration.   CARDIOMEDIASTINAL SILHOUETTE: Cardiomediastinal silhouette is normal in size and configuration. There is a right internal jugular approach central venous catheter terminating in the superior vena cava.   LUNGS: Low lung volumes with resultant bronchovascular crowding. Hazy left basilar opacity with blunting of the left costophrenic angle which may represent atelectasis or infiltrate. No evidence of sizeable pleural effusion.   ABDOMEN: No remarkable upper abdominal findings.   BONES: No acute osseous changes.       Left retrocardiac opacity which may represent atelectasis or infiltrate.   I personally reviewed the images/study and I agree with the findings as stated by Kishore Naylor MD. This study was interpreted at [facility], [place], [place]   MACRO: None   Signed by: Damien Fulton 1/24/2024 9:52 PM Dictation workstation:   [id number]    Point of Care Ultrasound    Result Date: 1/24/2024  Rich Beckman MD     1/24/2024  8:48 PM Performed by: Rich Beckman MD Authorized by: Rich Beckman MD  Respiratory Indications: hypoxia Procedure: Thoracic Ultrasound Findings: R Lung Sliding: The RIGHT chest was evaluated and LUNG SLIDING was visualized. L Lung Sliding: The LEFT chest was evaluated and LUNG SLIDING was visualized. L Effusion: The LEFT chest was evaluated and there was a PLEURAL EFFUSION. Impression: Thorax: The  focused thoracic ultrasound exam had ABNORMAL findings as specified. and LEFT pleural effusion and B-lines     Point of Care Ultrasound    Result Date: 1/24/2024  Rich Beckman MD     1/24/2024  8:48 PM Performed by: Rich Beckman MD Authorized by: Rich Beckman MD  Respiratory Indications: hypoxia Procedure: Cardiac Ultrasound Findings:  Views: parasternal long and parasternal short The pericardial space was visualized and was NEGATIVE for a significant pericardial effusion. LV: LV systolic function was NORMAL. Impression: Cardiac: The focused cardiac ultrasound exam was NORMAL.     Electrocardiogram, 12-lead PRN ACS symptoms    Result Date: 1/24/2024  Normal sinus rhythm Possible Anterior infarct (cited on or before 15-ISKA-2024) Abnormal ECG When compared with ECG of 26-JUN-2023 20:27, Previous ECG has undetermined rhythm, needs review Confirmed by Mimi Lua ([id number]) on 1/24/2024 6:18:58 AM    XR abdomen 1 view    Result Date: 1/16/2024  Interpreted By:  Marcin Vincent, STUDY: XR ABDOMEN 1 VIEW;  1/16/2024 4:53 pm   INDICATION: Signs/Symptoms:Abdominal pain, diarrhea.   COMPARISON: None.   ACCESSION NUMBER(S): [id number]   ORDERING CLINICIAN: KARISSA BARTLETT   FINDINGS: Nonobstructive bowel gas pattern. Limited evaluation of pneumoperitoneum on supine imaging, however no gross evidence of free air is noted.   Visualized lungs are clear.   Osseous structures demonstrate no acute bony changes.       Nonobstructive, nonspecific bowel-gas pattern. No x-ray evidence for acute abnormality.   MACRO: None   Signed by: Marcin Vincent 1/16/2024 8:55 PM Dictation workstation:   [id number]    FL insert tunneled LELE ANUPAM catheter wo port pump > 5 years    Result Date: 1/16/2024  Interpreted By:  Elie Hussein, STUDY: FL INSERT TUNNELED LELE ANUPAM CATHETER WO PORT PUMP > 5 YEARS; US GUIDED PERCUTANEOUS PLACEMENT; 1/16/2024 11:55 am; 1/16/2024 11:38 am   INDICATION: Right foot, requiring extended term intravenous  antibiotics; referred for insertion of a tunneled central venous catheter.   COMPARISON: None   ACCESSION NUMBER(S): [id number]; [id number]   ORDERING CLINICIAN: SOHA DELUNA   TECHNIQUE: Ultrasound-guided vascular access Fluoroscopy guided insertion of tunneled central venous catheter without port Fluoroscopy time:2 seconds Images: 1 Dose: 0.33 mGy air kerma   FINDINGS: Informed consent obtained. Patient positioned supine and connected to physiologic monitoring.  All elements of maximal sterile barrier were utilized including cap, mask, sterile gown, sterile gloves, large sterile drape, hand scrub, 2% chlorhexidine for skin cleaning and sterile ultrasound guidance. Ultrasound of right neck demonstrated patent internal jugular vein. Under ultrasound guidance, access into the vein was established with micro puncture and permanent image archived. Small dermatotomy made few cm below clavicle. A 5 Persian single lumen cuffed hemodialysis catheter ([employer] Labsrome) was tunneled from dermatotomy to venotomy. Introducer sheath exchanged for a peel-away sheath. Distal end of the tunneled catheter advanced centrally through the peel-away sheath. Fluoroscopy confirmed tip of catheter at lower SVC. The catheter aspirated and flushed freely, secured, and covered with dressing. Patient tolerated procedure well.       Ultrasound and fluoroscopy guided insertion of tunneled central venous catheter.     Signed by: Elie Hussein 1/16/2024 12:44 PM Dictation workstation:   [id number]    US guided percutaneous placement    Result Date: 1/16/2024  Interpreted By:  Elie Hussein, STUDY: FL INSERT TUNNELED LELE ANUPAM CATHETER WO PORT PUMP > 5 YEARS; US GUIDED PERCUTANEOUS PLACEMENT; 1/16/2024 11:55 am; 1/16/2024 11:38 am   INDICATION: Right foot, requiring extended term intravenous antibiotics; referred for insertion of a tunneled central venous catheter.   COMPARISON: None   ACCESSION NUMBER(S): [id number]; [id number]   ORDERING  CLINICIAN: SOHA DELUNA   TECHNIQUE: Ultrasound-guided vascular access Fluoroscopy guided insertion of tunneled central venous catheter without port Fluoroscopy time:2 seconds Images: 1 Dose: 0.33 mGy air kerma   FINDINGS: Informed consent obtained. Patient positioned supine and connected to physiologic monitoring.  All elements of maximal sterile barrier were utilized including cap, mask, sterile gown, sterile gloves, large sterile drape, hand scrub, 2% chlorhexidine for skin cleaning and sterile ultrasound guidance. Ultrasound of right neck demonstrated patent internal jugular vein. Under ultrasound guidance, access into the vein was established with micro puncture and permanent image archived. Small dermatotomy made few cm below clavicle. A 5 [place] single lumen cuffed hemodialysis catheter ([employer]rome) was tunneled from dermatotomy to venotomy. Introducer sheath exchanged for a peel-away sheath. Distal end of the tunneled catheter advanced centrally through the peel-away sheath. Fluoroscopy confirmed tip of catheter at lower SVC. The catheter aspirated and flushed freely, secured, and covered with dressing. Patient tolerated procedure well.       Ultrasound and fluoroscopy guided insertion of tunneled central venous catheter.     Signed by: Elie Hussein 1/16/2024 12:44 PM Dictation workstation:   [id number]    US renal complete    Result Date: 1/11/2024  Interpreted By:  James Aaron, STUDY: US RENAL COMPLETE   INDICATION: Signs/Symptoms:VENKAT   COMPARISON: CT scan from August 2023   ACCESSION NUMBER(S): [id number]   ORDERING CLINICIAN: CARYN HERRERA   TECHNIQUE: Real-time renal ultrasound was performed.   FINDINGS: There is some limitation, especially for the left kidney, due to patient's body habitus and overlying bowel gas obscuring some detail.   The right kidney measures 11.2 cm and the left kidney 10.5 cm in maximal length. There is normal thickness and echogenicity of the renal cortex bilaterally. No  renal calculi are identified. There is no hydronephrosis. No solid renal masses are seen. A minute, less than 5 mm cyst centrally in the left kidney that was reported on the previous CT scan can not be appreciated on today's ultrasound due to aforementioned limitations.   Images of the bladder were also obtained. The bladder is suboptimally evaluated due to incomplete distention. No bladder mass, stone or debris is identified. Bilateral ureteral jets were demonstrated.       Limited but grossly unremarkable renal ultrasound.   Signed by: James Aaron 1/11/2024 2:45 PM Dictation workstation:   [id number]    Vascular US PVR Without Exercise    Result Date: 1/11/2024           [place] [address]            Phone [phone]  Vascular Lab Report  VASC US PVR WITHOUT EXERCISE Patient Name:      ALBERT Mullins Physician:  [id number] Virgen Ernandez MD, RPVI Study Date:        1/11/2024            Ordering Provider:  [id number] JENNIFER CARTAGENA MRN/PID:           [id number]             Fellow: Accession#:        [id number]         Technologist:       Judie Robles RVT Date of Birth/Age: 1965 / 58 years Technologist 2: Gender:            F                    Encounter#:         [id number] Admission Status:  Inpatient            Location Performed: [facility]  Diagnosis/ICD: Peripheral vascular disease, unspecified-I73.9 CPT Codes:     81206 Peripheral artery PVR (multi segmental pressure  CONCLUSIONS:  Right Lower PVR: No evidence of arterial occlusive disease in the right lower extremity at rest. Normal digital perfusion noted. Triphasic flow is noted in the right common femoral artery, right popliteal artery, right posterior tibial artery and right dorsalis pedis artery. Left Lower PVR: No evidence of arterial occlusive disease  in the left lower extremity at rest. Triphasic flow is noted in the left common femoral artery, left popliteal artery, left posterior tibial artery and left dorsalis pedis artery. Unable to obtain TBI due to half amputation of the great toe and second digit amputation. PPG tracing appears normal.  Comparison: Compared with study from 9/17/2019, no significant change.  Imaging & Doppler Findings:  RIGHT Lower PVR                Pressures Ratios Right High Thigh               210 mmHg  1.76 Right Low Thigh                159 mmHg  1.34 Right Calf                     127 mmHg  1.07 Right Posterior Tibial (Ankle) 141 mmHg  1.18 Right Dorsalis Pedis (Ankle)   129 mmHg  1.08 Right Digit (Great Toe)        141 mmHg  1.18   LEFT Lower PVR                Pressures Ratios Left High Thigh               209 mmHg  1.76 Left Low Thigh                144 mmHg  1.21 Left Calf                     134 mmHg  1.13 Left Posterior Tibial (Ankle) 124 mmHg  1.04 Left Dorsalis Pedis (Ankle)   137 mmHg  1.15                      Right     Left Brachial Pressure 114 mmHg 119 mmHg   [id number] Virgen Ernandez MD, RPVI Electronically signed by [id number] Virgen Ernandez MD, RPVI on 1/11/2024 at 2:44:43 PM  ** Final **     CT foot right w IV contrast    Result Date: 1/10/2024  Interpreted By:  Kay Lundy, STUDY: CT FOOT RIGHT W IV CONTRAST; ;  1/10/2024 11:31 am   INDICATION: Signs/Symptoms:Rule out abscess.   COMPARISON: None.   ACCESSION NUMBER(S): [id number]   ORDERING CLINICIAN: URPERTO SOLORZANO   TECHNIQUE: Serial axial CT images obtained of the right foot. Images reformatted in the coronal and sagittal projection.   All CT examinations are performed with 1 or more of the following dose reduction techniques: Automated exposure control, adjustment of mA and/or kv according to patient's size, or use of iterative reconstruction techniques.   FINDINGS: Postoperative changes status post long-stem screw fixation across the 1st ray extending from the  1st metatarsal head through the 1st tarsometatarsal and region of the navicular bone with the proximal screw terminating within the talus. There is solid osseous fusion across the 1st tarsometatarsal articulation. Osseous fragmentation is demonstrated across the navicular bone. Of note, the proximal screw fixation within the talus demonstrates surrounding lucency with lytic appearing lucency throughout the screw fixation measuring up to 6 mm with loosening of the proximal fixation.   Cannulated screw fixation across the posterior facet of the subtalar joint from the plantar calcaneal tuberosity is intact. No surrounding loosening demonstrated. There is component of solid osseous fusion across the central to lateral margin of the posterior facet of the subtalar joint.   Residual navicular bone demonstrates dorsal subluxation from the talonavicular and naviculocuneiform articulations. There is component of osseous fragmentation of this bone. Remainder of the midfoot osseous structures demonstrate congruent calcaneocuboid articulation. The inter cuneiform articulations demonstrate component of intertarsal osteoarthritis. The Lisfranc joint is congruent. Mild osteoarthritis demonstrated across the tarsometatarsal articulations.   Forefoot metatarsal shafts demonstrate bones to be osteopenic. No cortical or trabecular discontinuity. 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 Remainder of the articulations are unremarkable. Digits distally demonstrate no evidence for fracture. There is skin ulceration along the tip of the 1st digit.. No loss of cortical margin or definite osteomyelitis. However, cellulitis is demonstrated. No abscess formation.           1. Postoperative fixation across the 1st ray with solid osseous fusion across the 1st tarsometatarsal articulation. There is osseous fragmentation of the navicular bone with lytic lucency about the proximal screw fixation in the talus with resultant  loosening.   2. Skin ulceration plantar and distal margin of the 1st digit about the 1st distal phalanx with surrounding cellulitis. No definite abscess formation. No loss of cortical margin or definite osteomyelitis.     MACRO: None   Signed by: Kay Lundy 1/10/2024 12:27 PM Dictation workstation:   [id number]    XR foot right 3+ views    Result Date: 1/9/2024  Interpreted By:  Marcin Vincent, STUDY: XR FOOT RIGHT 3+ VIEWS; 1/9/2024 12:18 pm   INDICATION: Signs/Symptoms:increasing pain/wound. Pain worsening wound on the right great toe.   COMPARISON: 12/04/2019   ACCESSION NUMBER(S): [id number]   ORDERING CLINICIAN: LINN AMAYA   TECHNIQUE: Views: Right foot AP, Lat, Oblique   FINDINGS: Arthrodesis with a large intramedullary lonny/screw extending from the diaphysis of the 1st metatarsal through the midfoot and hindfoot to the level of the talus. Mild Doris screw lucency distally within the talus, of doubtful clinical significance. 2nd screw fixates the talus and calcaneus without Doris screw lucency or abnormality. There are chronic degenerative changes of the talus and navicular bones which are slightly progressed from the prior examination. The remainder of the examination shows mild degenerative changes of the 1st metatarsophalangeal joint   There is soft tissue swelling of the right great toe. No bony erosion or underlying gas is seen.       Soft tissue swelling of the right great toe without bony erosion or underlying soft tissue gas.   Chronic appearing degenerative changes. Arthrodesis of the right foot as described in the body of the report.   Signed by: Marcin Vincent 1/9/2024 1:08 PM Dictation workstation:   [id number]         Assessment/Plan   Principal Problem:    Syncope, unspecified syncope type    Nuris Squires is a 58 y.o. female with PMHx of T2DM (HbA1c 13 01/2024) c/b neuropathy, HTN, CKD stage III, multiple foot infections s/p recent partial R big toe amputation in which she grew  Group B strep and was on 6 week course of ceftriaxone via tunneled line (Serena catheter placed 01/15/24, CTX to be continued through 02/21/24), recent admission for PNA on 4L home O2 that presented on 02/02/24 with altered mental status and syncope. Patient noted to have hypoglycemia and was found to be significantly hypothermic on admission. Of note, patient has had recurrent hypoglycemia since admission and admission blood cultures (4/4) now growing coagulase negative staph, suggesting that bacteremia may be leading to recurrent hypoglycemia. Potential sources include central line infection, secondary infection of toe, or potentially urine (patient notably grew VRE in urine culture on 01/31, but this was thought to be an asymptomatic colonization and was never treated) . Per ID, Serena line can be kept in as Bcx with coag negative Staph likely contaminant and discontinue vancomycin. Should continue ceftriaxone till 2/21 for GBS infection of R foot. Endorsing feel chilly this AM, repeat vitals this AM T 94.4, 140/71, HP 64, RR 19, SpO2 94% on 1L, so low concern for systemic signs of infection. Chills seemed to have resolved by rounds.    Blood glucose increased to 200s in the last 24hrs without insulin, only given 2u lispro total yesterday. Will restart lantus at 5u today. AM cortisol wnl today. Patient continuing to endorse 3 day duration of food getting stuck in her stomach. Will start on PPI today. Will continue to monitor and consider workup if symptoms worsen. Creatinine 2.07 today, up from 1.77 yesterday and baseline of 1.2-1.5, concerning for VENKAT iso CKD3. Prerenal VENKAT more likely, will try bolus dose NS on 1L over 2hr. UA positive for 1+ protein, 3+ glucose, and leukocyte esterase, WBC >50, RBC >20. Pending urine culture.     Updates 2/7/2024:  - Cr up to 2.07 today (up from 1.77)  - Concern for prerenal VENKAT, will give 1L NS bolus  - UA positive for 1+ protein, 3+ glucose, and leukocyte esterase, WBC >50, RBC  >20  - Pending urine culture  - AM cortisol wnl  - Will keep Serena line in per ID  - C/w ceftriaxone until 2/21 per ID and discontinue vancomycin  - Restart lantus on 5u  - Start on pantoprazole 40mg PO daily  - Wean off 2L NC  - Reached out to get her a humidifier   - Encourage IS use 10x/hr     #Coagulase Negative Staph bacteremia  #s/p R great toe partial amputation  #GBS osteomyelitis  #Hypothermia, resolved  - s/p R great toe partial amputation on 01/12 with podiatry for source control of infection  - intra-op culture grew rare Group B streptococcus  - R chest tunneled line placed 01/15 for 6 weeks of IV ceftriaxone planned end 02/21/2024  - Echo during last admission 01/25 shows LVEF 70-75%, mildly increased LV septal wall thickness, otherwise unremarkable  - Culture data  - Blood cultures x2 02/03/24, MRSE 4/4 bottles  - ID consulted, appreciate recs  - Switched from Zosyn to Ceftriaxone on 2/4 per ID recs  Plan:  - Repeat blood cultures from 2/5 with no growth at 2 days  - Discontinue IV Vancomycin (2/3-), continue ceftriaxone (2/4-2/21)  - ID not recommending Serena line removal at this time     #T2DM c/b neuropathy, CKD III  #Recurrent hypoglycemia  - A1c 13.1% on 01/24/2024  - Home regimen: lantus 70 units, prandial lispro 10 units TID  - Got 50 units Lantus on evening of 2/3 with AM BG 2/4 in the 20's  - Suspect bacteremia is driving hypoglycemia  Plan:  - hold metformin  - restart lantus on 5u today d/t increased BG readings       #recent PNA  - discharged home on 4L O2 on 01/31/2024  Plan:  - incentive spirometer   - Wean O2 as tolerated for goal SpO2 > 92%     #HTN  - Continue home clonidine and amlodipine     #DLD  - continue atorvastatin 40 mg  - Continue home aspirin 81 mg (Can discuss stopping with patient as this appears to be for primary prevention)     #VENKAT on CKD Stage III  - baseline Cr around 1.4  - Cr up to 2.07 today  Plan:  - Pending urine lytes and UA     Diet: Diabetic 75 carb/meal 45  carb/snack  Access: PIV, tunneled line (placed 01/15/24)  DVT prophylaxis: Lovenox BID  Code status: Full code (confirmed on admission)  NOK: Brandon Rousseau (Significant Other)  [phone]          Stefanie Urban, MS-3  Seen and discussed with Dr. Melba Andrew (Resident) and Dr. Austin Nance

## 2024-02-07 NOTE — PROGRESS NOTES
Wound Care Progress Note     Visit Date: 2/7/2024      Patient Name: Nuris Squires         MRN: [id number]                Reason for Visit: Right great toe      Wound History: Nuris Squires is a 58 y.o. female with PMHx of T2DM (HbA1c 13 01/2024) c/b neuropathy, HTN, CKD stage III, multiple foot infections s/p recent partial R big toe amputation in which she grew Group B strep and was on 6 week course of ceftriaxone via tunneled line (Serena catheter placed 01/15/24, CTX to be continued through 02/21/24), recent admission for PNA on 4L home O2 that presented on 02/02/24 with altered mental status and syncope.      Wound Assessment:  Wound 01/12/24 Incision Dorsal foot;Right (Active)   Date First Assessed/Time First Assessed: 01/12/24 1320   Present on Original Admission: No  Hand Hygiene Completed: Yes  Primary Wound Type: Incision  Wound Location Orientation: Dorsal foot;Right      Assessments 2/7/2024  3:27 PM   Wound Image     Site Assessment Denuded;Eschar   Doris-Wound Assessment Calloused   Wound Length (cm) 5 cm   State of Healing Closed wound edges;Slough   Margins Not attached   Closure Sutures   Drainage Description Serous   Drainage Amount Scant   Dressing Xeroform;Silicone border dressing   Dressing Changed New   Dressing Status Clean;Dry;Occlusive       No associated orders.       Wound 01/12/24 Incision Dorsal foot;Right (Active)   Wound Image   02/07/24 1527   Site Assessment Denuded;Eschar 02/07/24 1527   Doris-Wound Assessment Calloused 02/07/24 1527   Wound Length (cm) 5 cm 02/07/24 1527   State of Healing Closed wound edges;Slough 02/07/24 1527   Margins Not attached 02/07/24 1527   Closure Sutures 02/07/24 1527   Sutures/Staple Line Approximated 02/07/24 0924   Drainage Description Serous 02/07/24 1527   Drainage Amount Scant 02/07/24 1527   Dressing Xeroform;Silicone border dressing 02/07/24 1527   Dressing Changed New 02/07/24 1527   Dressing Status Clean;Dry;Occlusive 02/07/24 1527     Wound  care recommendation by site.     Right great toe  Wound type: surgical site from OSH  Periwound skin: firm thickened callous tissue to dorsal medial great toe extending to dorsal lateral toe.    Recommendations: DAILY      Irrigate with normal saline or Vashe wound cleanser ([employer] order # [id number])     Apply Xeroform to adherent slough at distal portion of incision     Cover with Mepilex border dressing     Wound Team Plan: **Patient may benefit from podiatry consult for assessment and possible debridement. Primary provider, please review recommendation. If you agree with recommendation please enter as wound orders in EMR. Thank you.      Renee Early RN, CWON  2/7/2024  3:29 PM

## 2024-02-07 NOTE — CARE PLAN
The patient's goals for the shift include Feel better    The clinical goals for the shift include patient will be free of syncopal episodes this shift    Problem: Pain  Goal: Takes deep breaths with improved pain control throughout the shift  Outcome: Progressing  Goal: Turns in bed with improved pain control throughout the shift  Outcome: Progressing  Goal: Walks with improved pain control throughout the shift  Outcome: Progressing  Goal: Performs ADL's with improved pain control throughout shift  Outcome: Progressing  Goal: Participates in PT with improved pain control throughout the shift  Outcome: Progressing  Goal: Free from opioid side effects throughout the shift  Outcome: Progressing  Goal: Free from acute confusion related to pain meds throughout the shift  Outcome: Progressing

## 2024-02-07 NOTE — NURSING NOTE
Assumed care of patient after receiving report from departing RN. Pt lying in bed watching TV,  is at the bedside, respirations are even and unlabored, and no distress noted on exam. Patient expresses no needs at this time. Bed in locked and lowered position with call light within reach. Patient oriented to room. All other safety measures are intact. Will continue with current plan of care and update as necessary.

## 2024-02-07 NOTE — CARE PLAN
The patient's goals for the shift include will be safe and free from injury     The clinical goals for the shift include Pt  will not have any dizziness through out the shift.    Problem: Pain  Goal: Takes deep breaths with improved pain control throughout the shift  Outcome: Progressing     Problem: Pain  Goal: Takes deep breaths with improved pain control throughout the shift  Outcome: Progressing  Goal: Turns in bed with improved pain control throughout the shift  Outcome: Progressing  Goal: Walks with improved pain control throughout the shift  Outcome: Progressing  Goal: Performs ADL's with improved pain control throughout shift  Outcome: Progressing  Goal: Participates in PT with improved pain control throughout the shift  Outcome: Progressing  Goal: Free from opioid side effects throughout the shift  Outcome: Progressing  Goal: Free from acute confusion related to pain meds throughout the shift  Outcome: Progressing

## 2024-02-07 NOTE — CONSULTS
Vancomycin Dosing by Pharmacy- Cessation of Therapy    Consult to pharmacy for vancomycin dosing has been discontinued by the prescriber, pharmacy will sign off at this time.    Please call pharmacy if there are further questions or re-enter a consult if vancomycin is resumed.     Lynne Arzola, AurelianoD

## 2024-02-08 ENCOUNTER — APPOINTMENT (OUTPATIENT)
Dept: WOUND CARE | Facility: HOSPITAL | Age: 59
End: 2024-02-08
Payer: COMMERCIAL

## 2024-02-08 LAB
ALBUMIN SERPL BCP-MCNC: 2.9 G/DL (ref 3.4–5)
ANION GAP SERPL CALC-SCNC: 14 MMOL/L (ref 10–20)
BACTERIA UR CULT: NORMAL
BASOPHILS # BLD AUTO: 0.04 X10*3/UL (ref 0–0.1)
BASOPHILS NFR BLD AUTO: 0.7 %
BUN SERPL-MCNC: 20 MG/DL (ref 6–23)
CALCIUM SERPL-MCNC: 9 MG/DL (ref 8.6–10.6)
CHLORIDE SERPL-SCNC: 109 MMOL/L (ref 98–107)
CO2 SERPL-SCNC: 26 MMOL/L (ref 21–32)
CREAT SERPL-MCNC: 1.89 MG/DL (ref 0.5–1.05)
EGFRCR SERPLBLD CKD-EPI 2021: 30 ML/MIN/1.73M*2
EOSINOPHIL # BLD AUTO: 0.14 X10*3/UL (ref 0–0.7)
EOSINOPHIL NFR BLD AUTO: 2.5 %
ERYTHROCYTE [DISTWIDTH] IN BLOOD BY AUTOMATED COUNT: 13.3 % (ref 11.5–14.5)
GLUCOSE BLD MANUAL STRIP-MCNC: 129 MG/DL (ref 74–99)
GLUCOSE BLD MANUAL STRIP-MCNC: 173 MG/DL (ref 74–99)
GLUCOSE BLD MANUAL STRIP-MCNC: 175 MG/DL (ref 74–99)
GLUCOSE BLD MANUAL STRIP-MCNC: 218 MG/DL (ref 74–99)
GLUCOSE SERPL-MCNC: 159 MG/DL (ref 74–99)
HCT VFR BLD AUTO: 28.9 % (ref 36–46)
HGB BLD-MCNC: 9 G/DL (ref 12–16)
IMM GRANULOCYTES # BLD AUTO: 0.02 X10*3/UL (ref 0–0.7)
IMM GRANULOCYTES NFR BLD AUTO: 0.4 % (ref 0–0.9)
LYMPHOCYTES # BLD AUTO: 1.86 X10*3/UL (ref 1.2–4.8)
LYMPHOCYTES NFR BLD AUTO: 32.6 %
MAGNESIUM SERPL-MCNC: 2.21 MG/DL (ref 1.6–2.4)
MCH RBC QN AUTO: 30.5 PG (ref 26–34)
MCHC RBC AUTO-ENTMCNC: 31.1 G/DL (ref 32–36)
MCV RBC AUTO: 98 FL (ref 80–100)
MONOCYTES # BLD AUTO: 0.53 X10*3/UL (ref 0.1–1)
MONOCYTES NFR BLD AUTO: 9.3 %
NEUTROPHILS # BLD AUTO: 3.12 X10*3/UL (ref 1.2–7.7)
NEUTROPHILS NFR BLD AUTO: 54.5 %
NRBC BLD-RTO: 0 /100 WBCS (ref 0–0)
PHOSPHATE SERPL-MCNC: 4.1 MG/DL (ref 2.5–4.9)
PLATELET # BLD AUTO: 418 X10*3/UL (ref 150–450)
POTASSIUM SERPL-SCNC: 4.3 MMOL/L (ref 3.5–5.3)
RBC # BLD AUTO: 2.95 X10*6/UL (ref 4–5.2)
SODIUM SERPL-SCNC: 145 MMOL/L (ref 136–145)
WBC # BLD AUTO: 5.7 X10*3/UL (ref 4.4–11.3)

## 2024-02-08 PROCEDURE — 83735 ASSAY OF MAGNESIUM: CPT

## 2024-02-08 PROCEDURE — 2060000001 HC INTERMEDIATE ICU ROOM DAILY

## 2024-02-08 PROCEDURE — 2500000001 HC RX 250 WO HCPCS SELF ADMINISTERED DRUGS (ALT 637 FOR MEDICARE OP): Performed by: EMERGENCY MEDICINE

## 2024-02-08 PROCEDURE — 2500000001 HC RX 250 WO HCPCS SELF ADMINISTERED DRUGS (ALT 637 FOR MEDICARE OP)

## 2024-02-08 PROCEDURE — 80069 RENAL FUNCTION PANEL: CPT

## 2024-02-08 PROCEDURE — 2500000004 HC RX 250 GENERAL PHARMACY W/ HCPCS (ALT 636 FOR OP/ED)

## 2024-02-08 PROCEDURE — 99233 SBSQ HOSP IP/OBS HIGH 50: CPT | Performed by: INTERNAL MEDICINE

## 2024-02-08 PROCEDURE — 2500000005 HC RX 250 GENERAL PHARMACY W/O HCPCS

## 2024-02-08 PROCEDURE — 2500000004 HC RX 250 GENERAL PHARMACY W/ HCPCS (ALT 636 FOR OP/ED): Performed by: STUDENT IN AN ORGANIZED HEALTH CARE EDUCATION/TRAINING PROGRAM

## 2024-02-08 PROCEDURE — 2500000002 HC RX 250 W HCPCS SELF ADMINISTERED DRUGS (ALT 637 FOR MEDICARE OP, ALT 636 FOR OP/ED)

## 2024-02-08 PROCEDURE — 82947 ASSAY GLUCOSE BLOOD QUANT: CPT

## 2024-02-08 PROCEDURE — 36415 COLL VENOUS BLD VENIPUNCTURE: CPT

## 2024-02-08 PROCEDURE — 85025 COMPLETE CBC W/AUTO DIFF WBC: CPT

## 2024-02-08 RX ORDER — INSULIN GLARGINE 100 [IU]/ML
5 INJECTION, SOLUTION SUBCUTANEOUS ONCE
Status: COMPLETED | OUTPATIENT
Start: 2024-02-08 | End: 2024-02-08

## 2024-02-08 RX ORDER — INSULIN GLARGINE 100 [IU]/ML
8 INJECTION, SOLUTION SUBCUTANEOUS EVERY 24 HOURS
Status: DISCONTINUED | OUTPATIENT
Start: 2024-02-09 | End: 2024-02-10 | Stop reason: HOSPADM

## 2024-02-08 RX ADMIN — INSULIN GLARGINE 5 UNITS: 100 INJECTION, SOLUTION SUBCUTANEOUS at 20:58

## 2024-02-08 RX ADMIN — Medication 2000 UNITS: at 06:08

## 2024-02-08 RX ADMIN — CLONIDINE HYDROCHLORIDE 0.1 MG: 0.1 TABLET ORAL at 09:36

## 2024-02-08 RX ADMIN — SENNOSIDES AND DOCUSATE SODIUM 2 TABLET: 8.6; 5 TABLET ORAL at 20:58

## 2024-02-08 RX ADMIN — ATORVASTATIN CALCIUM 40 MG: 40 TABLET, FILM COATED ORAL at 09:36

## 2024-02-08 RX ADMIN — CLONIDINE HYDROCHLORIDE 0.1 MG: 0.1 TABLET ORAL at 20:58

## 2024-02-08 RX ADMIN — ENOXAPARIN SODIUM 40 MG: 100 INJECTION SUBCUTANEOUS at 09:36

## 2024-02-08 RX ADMIN — Medication 400 MG: at 09:36

## 2024-02-08 RX ADMIN — ACETAMINOPHEN 975 MG: 325 TABLET ORAL at 09:50

## 2024-02-08 RX ADMIN — NYSTATIN 1 APPLICATION: 100000 POWDER TOPICAL at 22:06

## 2024-02-08 RX ADMIN — AMLODIPINE BESYLATE 10 MG: 10 TABLET ORAL at 09:36

## 2024-02-08 RX ADMIN — Medication: at 16:45

## 2024-02-08 RX ADMIN — ENOXAPARIN SODIUM 40 MG: 100 INJECTION SUBCUTANEOUS at 20:58

## 2024-02-08 RX ADMIN — ASPIRIN 81 MG 81 MG: 81 TABLET ORAL at 06:08

## 2024-02-08 RX ADMIN — INSULIN LISPRO 1 UNITS: 100 INJECTION, SOLUTION INTRAVENOUS; SUBCUTANEOUS at 18:05

## 2024-02-08 RX ADMIN — SENNOSIDES AND DOCUSATE SODIUM 2 TABLET: 8.6; 5 TABLET ORAL at 09:36

## 2024-02-08 RX ADMIN — NYSTATIN 1 APPLICATION: 100000 POWDER TOPICAL at 09:36

## 2024-02-08 RX ADMIN — CEFTRIAXONE SODIUM 2 G: 2 INJECTION, SOLUTION INTRAVENOUS at 18:05

## 2024-02-08 RX ADMIN — PANTOPRAZOLE SODIUM 40 MG: 40 TABLET, DELAYED RELEASE ORAL at 06:08

## 2024-02-08 RX ADMIN — INSULIN LISPRO 1 UNITS: 100 INJECTION, SOLUTION INTRAVENOUS; SUBCUTANEOUS at 14:22

## 2024-02-08 RX ADMIN — INSULIN GLARGINE 5 UNITS: 100 INJECTION, SOLUTION SUBCUTANEOUS at 09:36

## 2024-02-08 ASSESSMENT — COGNITIVE AND FUNCTIONAL STATUS - GENERAL
HELP NEEDED FOR BATHING: A LITTLE
PERSONAL GROOMING: A LITTLE
DRESSING REGULAR LOWER BODY CLOTHING: A LITTLE
WALKING IN HOSPITAL ROOM: A LITTLE
MOBILITY SCORE: 18
MOVING FROM LYING ON BACK TO SITTING ON SIDE OF FLAT BED WITH BEDRAILS: A LITTLE
TURNING FROM BACK TO SIDE WHILE IN FLAT BAD: A LITTLE
DAILY ACTIVITIY SCORE: 19
TOILETING: A LITTLE
DRESSING REGULAR UPPER BODY CLOTHING: A LITTLE
STANDING UP FROM CHAIR USING ARMS: A LITTLE
MOVING TO AND FROM BED TO CHAIR: A LITTLE
CLIMB 3 TO 5 STEPS WITH RAILING: A LITTLE

## 2024-02-08 ASSESSMENT — PAIN SCALES - GENERAL
PAINLEVEL_OUTOF10: 3
PAINLEVEL_OUTOF10: 0 - NO PAIN

## 2024-02-08 ASSESSMENT — PAIN - FUNCTIONAL ASSESSMENT
PAIN_FUNCTIONAL_ASSESSMENT: 0-10
PAIN_FUNCTIONAL_ASSESSMENT: 0-10

## 2024-02-08 NOTE — PROGRESS NOTES
Nuris Squires is a 58 y.o. female on day 5 of admission presenting with Syncope, unspecified syncope type.    Subjective   Pt was seen resting comfortably in bed and NAD. Pt is s/p right hallux amputation (DOS: 1/12/24). Pt notes minor pain to the surgical site however is well managed with pain medication. Pt has no other pedal complaints and denies constitutional symptoms. Podiatry was consulted for possible necrosis of surgical site.         Physical Exam    Objective     REVIEW OF SYSTEMS  GENERAL:  Negative for malaise, significant weight loss, fever  HEENT:  No changes in hearing or vision, no nose bleeds or other nasal problems and Negative for frequent or significant headaches  NECK:  Negative for lumps, goiter, pain and significant neck swelling  RESPIRATORY:  Negative for cough, wheezing and shortness of breath  CARDIOVASCULAR:  Negative for chest pain, leg swelling and palpitations  GI:  Negative for abdominal discomfort, blood in stools or black stools and change in bowel habits  :  Negative for dysuria, frequency and incontinence  MUSCULOSKELETAL:  Negative for joint pain or swelling, back pain, and muscle pain.  SKIN:  S/P right hallux amputation  PSYCH:  Negative for sleep disturbance, mood disorder and recent psychosocial stressors  HEMATOLOGY/LYMPHOLOGY:  Negative for prolonged bleeding, bruising easily, and swollen nodes.  ENDOCRINE:  Negative for cold or heat intolerance, polyuria, polydipsia and goiter  NEURO: Negative, denies any burning, tingling or numbness     Objective:   VASCULAR:  DP and PT pulses are palpable bilateral. CFT <3 seconds to all digits bilateral. Mild non-pitting edema noted to the surgical site. Skin temperature is within normal limits. Temperature gradient warm to warm from proximal tibial to distal forefoot/digits. No focal increase in temperature noted. Negative pain upon compression of bilateral calf.    NEURO:  Light touch and protective sensation is  "diminished    DERM: S/P right hallux amputation to level of MPJ (DOS: 1/12/24). Skin edges are well coapted with staples. No signs of dehiscence. No active drainage or bleeding. No palpable fluctuance. No necrosis noted to surgical site. No clinical signs of infection. Significant diffuse xerosis noted to the right lower extremity    MSK:  S/P right hallux amputation (DOS: 1/12/24). Otherwise deferred 2/2 to post operative course.        Last Recorded Vitals  Blood pressure 166/84, pulse 72, temperature 36.9 °C (98.4 °F), resp. rate 18, height 1.575 m (5' 2\"), weight 102 kg (225 lb), SpO2 93 %.    Intake/Output last 3 Shifts:  I/O last 3 completed shifts:  In: 956 (9.4 mL/kg) [P.O.:956]  Out: 2190 (21.5 mL/kg) [Urine:2190 (0.6 mL/kg/hr)]  Weight: 102.1 kg     Relevant Results           Assessment/Plan   Principal Problem:    Syncope, unspecified syncope type    # Acute Osteomyelitis, right foot M86.171  # Right foot pain M79.671    Plan:  - Patient was seen at bedside and is resting comfortably.  A total of 55 minutes of face-to-face time was spent on this patient for professional services including but not limited to obtaining medially appropriate history, performing physical examination, collecting and explaining pertinent findings to patient, discussing relevant and viable treatment options, making appropriate level of medical decision, and coordinating care and facilitating treatment.  The end of the encounter was spent educating patient and family on treatment plan. All questions and concerns were answered and addressed to the pt's apparent satisfaction.  - Labs reviewed.  - Imaging reviewed.   - Continue pain management per Medicine/Primary  - No surgical intervention planned per podiatry  - Pt may follow up with Dr. Pizano, her podiatric surgeon, for post operative management at her clinic  - Podiatry will sign off.  - All questions and concerns were answered and addressed to the patient and patient's family " apparent satisfaction  - Treatment and plan was discussed with attending Dr. Mali Holbrook DPM    Case to be discussed with attending, A&P above reflects a tentative plan. Please await for the final signature from the attending physician on service.     Bk Quarles DPM PGY-3  Podiatric Medicine & Surgery  Please Haiku message me with any questions or concerns.         Bk Quarles DPM

## 2024-02-08 NOTE — PROGRESS NOTES
Pharmacy Admission Order Reconciliation Review    Nuris Squires is a 58 y.o. female admitted for Syncope, unspecified syncope type. Pharmacy reviewed the patient's unreconciled admission medications.    Prior to admission medications that were reviewed by the pharmacist and found to be duplicates with current inpatient orders include:  Oxygen therapy  These medications have been reconciled.     Any other unreconcilied medications have been addressed and will be ordered or held by the patient's medical team. Medications addressed by the pharmacist may be added or changed by the patient's medical team at any time.    Charlene Shetty, Alf  Transitions of Care Pharmacist  [facility]  Please reach out via Secure Chat for questions, or if no response call [phone]

## 2024-02-08 NOTE — PROGRESS NOTES
Nuris Squires is a 58 y.o. female on day 5 of admission presenting with Syncope, unspecified syncope type.    Transitional Care Coordination Progress Note:  Patient discussed during interdisciplinary rounds.   Team members present: Md and TCC  Plan per Medical/Surgical team: VENKAT Improving  Payor: [facility]  Discharge disposition: Home with  home care-PCP- Mone Barlow  Potential Barriers: None  ADOD: 02/09/24      PROSPER DUMONT

## 2024-02-08 NOTE — CARE PLAN
The patient's goals for the shift include Feel better    The clinical goals for the shift include Pt will not experience syncopal episode through out the shift    Over the shift, the patient made progress towards all goals.

## 2024-02-08 NOTE — PROGRESS NOTES
Subjective   Nuris Squires is a 58 y.o. female on hospital day 5 overall doing well.  Shortness of breath is doing well and patient is getting around better with assistance.    Objective     Exam     Vitals:    02/07/24 0834 02/07/24 1654 02/07/24 2358 02/08/24 0741   BP: 140/71 139/74 147/73 166/84   Pulse: 64 66 71 72   Resp: 19 19 18    Temp: 36.9 °C (98.4 °F) 36.6 °C (97.9 °F) 37.4 °C (99.3 °F) 36.9 °C (98.4 °F)   TempSrc:       SpO2: 94% 96% 95% 93%   Weight:       Height:          Intake/Output last 3 shifts:  I/O last 3 completed shifts:  In: 956 (9.4 mL/kg) [P.O.:956]  Out: 2190 (21.5 mL/kg) [Urine:2190 (0.6 mL/kg/hr)]  Weight: 102.1 kg     Physical Exam  Vitals reviewed.   Constitutional:       Comments: Very pleasant   HENT:      Head: Normocephalic and atraumatic.      Mouth/Throat:      Mouth: Mucous membranes are moist.   Neck:      Vascular: No carotid bruit.   Cardiovascular:      Rate and Rhythm: Normal rate and regular rhythm.      Pulses: Normal pulses.      Heart sounds: No murmur heard.     No friction rub. No gallop.      Comments: Chest wall tunneled PICC unremarkable  Pulmonary:      Effort: Pulmonary effort is normal.      Breath sounds: Normal breath sounds. No wheezing, rhonchi or rales.   Abdominal:      General: Bowel sounds are normal. There is no distension.      Palpations: Abdomen is soft.      Tenderness: There is no abdominal tenderness. There is no guarding or rebound.   Musculoskeletal:      Cervical back: Neck supple.      Right lower leg: No edema.      Left lower leg: No edema.      Comments: Right great toe amputation photograph noted today   Skin:     General: Skin is warm and dry.      Comments: Candidal rash under pannus and in groin region without signs of further cellulitis   Neurological:      General: No focal deficit present.      Mental Status: She is alert and oriented to person, place, and time.   Psychiatric:         Mood and Affect: Mood normal.          Behavior: Behavior normal.            Medications   amLODIPine, 10 mg, oral, Daily  aspirin, 81 mg, oral, Daily  atorvastatin, 40 mg, oral, Daily  cefTRIAXone, 2 g, intravenous, q24h  cholecalciferol, 2,000 Units, oral, Daily  cloNIDine, 0.1 mg, oral, BID  enoxaparin, 40 mg, subcutaneous, q12h NEHEMIAS  insulin glargine, 5 Units, subcutaneous, Once  [START ON 2/9/2024] insulin glargine, 8 Units, subcutaneous, q24h  insulin lispro, 0-5 Units, subcutaneous, TID with meals  magnesium oxide, 400 mg, oral, Daily  nystatin, 1 Application, Topical, BID  oxygen, , inhalation, q24h  pantoprazole, 40 mg, oral, Daily before breakfast  sennosides-docusate sodium, 2 tablet, oral, BID       PRN medications: acetaminophen, albuterol, dextrose 10 % in water (D10W), dextrose, glucagon, meclizine, oxygen       Labs     All new labs reviewed:  some of the basic labs as follows -     Results from last 7 days   Lab Units 02/08/24 0723 02/07/24 0422 02/06/24  0735   WBC AUTO x10*3/uL 5.7 4.7 5.0   HEMOGLOBIN g/dL 9.0* 8.5* 8.1*   HEMATOCRIT % 28.9* 25.3* 24.8*   PLATELETS AUTO x10*3/uL 418 393 390   NEUTROS PCT AUTO % 54.5 46.5 34.9   LYMPHS PCT AUTO % 32.6 37.8 46.0   MONOS PCT AUTO % 9.3 10.8 12.7   EOS PCT AUTO % 2.5 3.6 4.8            Results from last 72 hours   Lab Units 02/08/24 0723 02/07/24 0422 02/06/24  0735   SODIUM mmol/L 145 142 144   POTASSIUM mmol/L 4.3 4.1 4.3   CHLORIDE mmol/L 109* 106 108*   CO2 mmol/L 26 28 30   BUN mg/dL 20 20 17   CREATININE mg/dL 1.89* 2.07* 1.77*       Results from last 72 hours   Lab Units 02/08/24 0723 02/07/24 0422 02/06/24  0735   ALBUMIN g/dL 2.9* 2.9* 2.9*   LIPASE U/L  --   --  5*       Results from last 72 hours   Lab Units 02/08/24  0723 02/07/24  0422 02/06/24  0735   GLUCOSE mg/dL 159* 258* 143*       Results from last 72 hours   Lab Units 02/07/24  1127   LEUKOCYTES U  500 Rashi/µL*   NITRITE U  NEGATIVE   WBC UR /HPF >50*   WBC CLUMP UR /HPF OCCASIONAL   RBC UR HPF /HPF >20*   BLOOD UR  " NEGATIVE       No results found for: \"TR1\"  Lab Results   Component Value Date    URINECULTURE No significant growth 02/07/2024    BLOODCULT No growth at 3 days 02/05/2024    BLOODCULT No growth at 3 days 02/05/2024    BLOODCULT Staphylococcus epidermidis (AA) 02/03/2024    BLOODCULT Staphylococcus epidermidis (AA) 02/03/2024            Imaging   ECG 12 Lead  Normal sinus rhythm  Possible Anterior infarct (cited on or before 15-ISAK-2024)  Abnormal ECG  When compared with ECG of 24-JAN-2024 20:35,  Premature supraventricular complexes are no longer Present  QRS axis Shifted left  Confirmed by Abhinav Wallace ([id number]) on 2/3/2024 11:09:00 PM  CT head wo IV contrast, CT cervical spine wo IV contrast  Narrative: Interpreted By:  Cheyanne Herrera and Dervishi Mario   STUDY:  CT HEAD WO IV CONTRAST; CT CERVICAL SPINE WO IV CONTRAST;  2/3/2024  12:24 am      INDICATION:  Signs/Symptoms:Unwitnessed fall, altered mental status      COMPARISON:  CT head: 06/27/2023, CT C-spine 01/25/2023      ACCESSION NUMBER(S):  [id number]; [id number]      ORDERING CLINICIAN:  CHEYANNE REYES      TECHNIQUE:  Axial noncontrast CT images of head with coronal and sagittal  reconstructed images. Axial noncontrast CT images of the cervical  spine with coronal and sagittal reconstructed images.      FINDINGS:  CT HEAD:      BRAIN PARENCHYMA: Unchanged calcification of the dentate nuclei and  globus pallidi. No acute intraparenchymal hemorrhage or parenchymal  evidence of acute large territory ischemic infarct. No mass-effect.  Gray-white matter distinction is preserved.      VENTRICLES and EXTRA-AXIAL SPACES:  No acute extra-axial or  intraventricular hemorrhage. No effacement of cerebral sulci.  Ventricles and sulci are age-concordant.      PARANASAL SINUSES/MASTOIDS:  No hemorrhage or air-fluid levels within  the visualized paranasal sinuses. The mastoids are well aerated.      CALVARIUM/ORBITS:  No skull fracture.  The orbits and globes " are  intact to the extent visualized.      EXTRACRANIAL SOFT TISSUES: No discernible abnormality.          CT CERVICAL SPINE:      PREVERTEBRAL SOFT TISSUES: Within normal limits.      CRANIOCERVICAL JUNCTION: Intact.      ALIGNMENT:  No traumatic malalignment or traumatic facet widening.      VERTEBRAE: Vertebral body heights are maintained. No acute fracture.  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  Thick ossification of the anterior longitudinal ligament and bridging  osteophytes at C3-C4 through C6-C7 compatible with DISH.      SPINAL CANAL/INTERVERTEBRAL DISCS: No high-grade spinal canal  stenosis. No significant disc height loss.      NEURAL FORAMINA: Multilevel facet and uncovertebral joint arthropathy  result in mild right C4-C5 foraminal stenosis but no high-grade  foraminal stenosis.      OTHER: None.      Impression: CT HEAD:  1. No acute intracranial abnormality or calvarial fracture.      CT CERVICAL SPINE:  1. No acute fracture or traumatic malalignment of the cervical spine.  2. Redemonstration of DISH without significant canal or foraminal  stenosis.  3. Redemonstration of chronic ununited fracture of the C6 spinous  process.      I personally reviewed the images/study and I agree with the findings  as stated by Resident Yovany Tolliver MD. This study was interpreted  at [place], [place].      MACRO:  None.      Signed by: Mike Herrera 2/3/2024 1:56 AM  Dictation workstation:   [id number]  XR chest 1 view  Narrative: STUDY:  Chest Radiograph;  2/2/2024 11:56 PM  INDICATION:  Fall and altered mental status.  COMPARISON:  1/30/2024 XR Chest two view  ACCESSION NUMBER(S):  [id number]  ORDERING CLINICIAN:  Mike Nino  TECHNIQUE:  Frontal chest was obtained at 23:56 hours.  FINDINGS:  CARDIOMEDIASTINAL SILHOUETTE:  Cardiomediastinal silhouette is enlarged in size and configuration.   Right IJ central venous catheter is seen  with the tip in the upper  SVC.     LUNGS:  Increased interstitial markings are seen bilaterally with airspace  disease at the left lung base and along left cardiac border.  Left  pleural effusion may be present.     ABDOMEN:  No remarkable upper abdominal findings.     BONES:  No acute osseous changes.  Impression: Cardiomegaly with increased interstitial markings bilaterally and left  basilar airspace disease with question of a left effusion.  CHF is a  consideration however left lower lobe pneumonia is suspected in the  appropriate clinical setting.  Signed by Jhon Stanton     No results found for this or any previous visit from the past 1095 days.     Encounter Date: 01/24/24   ECG 12 Lead   Result Value    Ventricular Rate 77    Atrial Rate 77    HI Interval 134    QRS Duration 78    QT Interval 396    QTC Calculation(Bazett) 448    P Axis 39    R Axis 26    T Axis 19    QRS Count 12    Q Onset 222    P Onset 155    P Offset 215    T Offset 420    QTC Fredericia 430    Narrative    Normal sinus rhythm  Possible Anterior infarct (cited on or before 15-ISAK-2024)  Abnormal ECG  When compared with ECG of 24-JAN-2024 20:35,  Premature supraventricular complexes are no longer Present  QRS axis Shifted left  Confirmed by Abhinav Wallace ([id number]) on 2/3/2024 11:09:00 PM        Assessment and Plan     Syncope: Unclear if related to infection versus hypoglycemia versus recent over diuresis.  Orthostatics were negative although multiple days into admission.  Urinalysis is now concerning for UTI which was not the case on presentation despite positive culture  -Continue antibiotics/ceftriaxone per infectious disease service.  No indication to remove PICC line per infectious disease input.  Infectious disease input greatly appreciated  -Follow-up repeat urine culture.  Per patient urinalysis was gathered from the toilet hat which is not going to be adequate sample.  I discussed with her how to do a proper clean-catch and she  feels she is able to do this.  We can repeat a urinalysis and culture at this time because the other sample is clinically meaningless  -Follow-up repeat blood cultures.  Remain no growth to date x 3 days    Cardiovascular:  -Continue clonidine and amlodipine  -Continue statin and aspirin  -Holding home lisinopril    Diabetes mellitus: Blood glucose now running elevated once again.  Last hemoglobin A1c is 13.1.  Required 6 units of sliding scale insulin yesterday along with the 5 units of Lantus in the morning.  Hypoglycemic episodes appear likely related to too much Lantus in the setting of renal dysfunction  -Continue Lantus but will shifted to the evening and plan to increase dose based on sliding scale insulin needs  -Continue mild sliding scale  -Hold metformin at this time  -Last podiatry to evaluate necrotic tissue at patient's surgical site.  Does not appear overtly infected at this time    Pulmonary:  -Incentive spirometer 10 times an hour while awake  -Wean O2  -Humidify O2    acute renal failure: Creatinine has come back down to 1.8 1:09 liter IV bolus.  Overall baseline appears possibly closer to 1.5.  Possibly related to recent diuresis versus antibiotics versus?  FeNa is 2.4  -Monitor renal function panel  -Monitor PVR  -Encourage oral hydration.  -Vancomycin has been stopped    Anemia: Hemoglobin incremented to 9 today  -Monitor CBC  -Monitor for gross blood losses including in stools    GI: On further description does not appear patient has somewhat early satiety as it is feeling food may get stuck as it enters the stomach pointing towards the GE junction.  Patient has been able to eat well  -Continue PPI.  If sensation does not improve she may need esophagram +/- GI endoscopic evaluation/follow-up  -Bowel care    DVT prophylaxis    Physical therapy/Occupational Therapy when appropriate    Austin Nance MD     Of note the above was done with Dragon dictation system.  Note was proofread to  minimize errors.

## 2024-02-08 NOTE — PROGRESS NOTES
"Nuris Squires is a 58 y.o. female on day 5 of admission presenting with Syncope, unspecified syncope type.    Subjective   NAEON. Patient doing well this AM, but endorsing slight HA. Denies fevers, chills, NS. Denies dysuria or trouble with urination. Still endorsing food sticking and not digesting properly in her stomach, but no N/V.       Objective     Physical Exam  Cardiovascular:      Rate and Rhythm: Normal rate and regular rhythm.   Pulmonary:      Breath sounds: Normal breath sounds.   Abdominal:      General: Abdomen is flat. Bowel sounds are normal.      Palpations: Abdomen is soft.      Tenderness: There is abdominal tenderness in the epigastric area.   Musculoskeletal:      Comments: R big toe amputation and L second toe amputation. Feet appear dry and flaky, but with no erythema, tenderness or purulence.     Last Recorded Vitals  Blood pressure 147/73, pulse 71, temperature 37.4 °C (99.3 °F), resp. rate 18, height 1.575 m (5' 2\"), weight 102 kg (225 lb), SpO2 95 %.  Intake/Output last 3 Shifts:  I/O last 3 completed shifts:  In: 1296 (12.7 mL/kg) [P.O.:596; IV Piggyback:700]  Out: 2690 (26.4 mL/kg) [Urine:2690 (0.7 mL/kg/hr)]  Weight: 102.1 kg     Relevant Results    Scheduled medications  amLODIPine, 10 mg, oral, Daily  aspirin, 81 mg, oral, Daily  atorvastatin, 40 mg, oral, Daily  cefTRIAXone, 2 g, intravenous, q24h  cholecalciferol, 2,000 Units, oral, Daily  cloNIDine, 0.1 mg, oral, BID  enoxaparin, 40 mg, subcutaneous, q12h NEHEMIAS  insulin glargine, 5 Units, subcutaneous, Daily  insulin lispro, 0-5 Units, subcutaneous, TID with meals  magnesium oxide, 400 mg, oral, Daily  nystatin, 1 Application, Topical, BID  oxygen, , inhalation, q24h  pantoprazole, 40 mg, oral, Daily before breakfast  sennosides-docusate sodium, 2 tablet, oral, BID      Continuous medications     PRN medications  PRN medications: acetaminophen, albuterol, dextrose 10 % in water (D10W), dextrose, glucagon, meclizine, " oxygen    Results for orders placed or performed during the hospital encounter of 02/02/24 (from the past 24 hour(s))   POCT GLUCOSE   Result Value Ref Range    POCT Glucose 207 (H) 74 - 99 mg/dL   Urine electrolytes   Result Value Ref Range    Sodium, Urine Random 73 mmol/L    Sodium/Creatinine Ratio 167 Not established. mmol/g Creat    Potassium, Urine Random 15 mmol/L    Potassium/Creatinine Ratio 34 Not established mmol/g Creat    Chloride, Urine Random 66 mmol/L    Chloride/Creatinine Ratio 151 38 - 318 mmol/g creat    Creatinine, Urine Random 43.7 20.0 - 320.0 mg/dL   Urinalysis with Reflex Culture and Microscopic   Result Value Ref Range    Color, Urine Colorless (N) Light-Yellow, Yellow, Dark-Yellow    Appearance, Urine Turbid (N) Clear    Specific Gravity, Urine 1.008 1.005 - 1.035    pH, Urine 6.5 5.0, 5.5, 6.0, 6.5, 7.0, 7.5, 8.0    Protein, Urine 50 (1+) (A) NEGATIVE, 10 (TRACE), 20 (TRACE) mg/dL    Glucose, Urine 300 (3+) (A) Normal mg/dL    Blood, Urine NEGATIVE NEGATIVE    Ketones, Urine NEGATIVE NEGATIVE mg/dL    Bilirubin, Urine NEGATIVE NEGATIVE    Urobilinogen, Urine Normal Normal mg/dL    Nitrite, Urine NEGATIVE NEGATIVE    Leukocyte Esterase, Urine 500 Rashi/µL (A) NEGATIVE   Microscopic Only, Urine   Result Value Ref Range    WBC, Urine >50 (A) 1-5, NONE /HPF    WBC Clumps, Urine OCCASIONAL Reference range not established. /HPF    RBC, Urine >20 (A) NONE, 1-2, 3-5 /HPF    Squamous Epithelial Cells, Urine 10-25 (FEW) Reference range not established. /HPF    Transitional Epithelial Cells, Urine 1-2 (FEW) Reference range not established. /HPF    Mucus, Urine FEW Reference range not established. /LPF   POCT GLUCOSE   Result Value Ref Range    POCT Glucose 214 (H) 74 - 99 mg/dL   POCT GLUCOSE   Result Value Ref Range    POCT Glucose 212 (H) 74 - 99 mg/dL   POCT GLUCOSE   Result Value Ref Range    POCT Glucose 228 (H) 74 - 99 mg/dL     ECG 12 Lead    Result Date: 2/3/2024  Normal sinus rhythm Possible  Anterior infarct (cited on or before 15-ISAK-2024) Abnormal ECG When compared with ECG of 24-JAN-2024 20:35, Premature supraventricular complexes are no longer Present QRS axis Shifted left Confirmed by Abhinav Wallace ([id number]) on 2/3/2024 11:09:00 PM    CT head wo IV contrast    Result Date: 2/3/2024  Interpreted By:  Cheyanne Herrera,  and Sharee Pedroza STUDY: CT HEAD WO IV CONTRAST; CT CERVICAL SPINE WO IV CONTRAST;  2/3/2024 12:24 am   INDICATION: Signs/Symptoms:Unwitnessed fall, altered mental status   COMPARISON: CT head: 06/27/2023, CT C-spine 01/25/2023   ACCESSION NUMBER(S): [id number]; [id number]   ORDERING CLINICIAN: CHEYANNE REYES   TECHNIQUE: Axial noncontrast CT images of head with coronal and sagittal reconstructed images. Axial noncontrast CT images of the cervical spine with coronal and sagittal reconstructed images.   FINDINGS: CT HEAD:   BRAIN PARENCHYMA: Unchanged calcification of the dentate nuclei and globus pallidi. No acute intraparenchymal hemorrhage or parenchymal evidence of acute large territory ischemic infarct. No mass-effect. Gray-white matter distinction is preserved.   VENTRICLES and EXTRA-AXIAL SPACES:  No acute extra-axial or intraventricular hemorrhage. No effacement of cerebral sulci. Ventricles and sulci are age-concordant.   PARANASAL SINUSES/MASTOIDS:  No hemorrhage or air-fluid levels within the visualized paranasal sinuses. The mastoids are well aerated.   CALVARIUM/ORBITS:  No skull fracture.  The orbits and globes are intact to the extent visualized.   EXTRACRANIAL SOFT TISSUES: No discernible abnormality.     CT CERVICAL SPINE:   PREVERTEBRAL SOFT TISSUES: Within normal limits.   CRANIOCERVICAL JUNCTION: Intact.   ALIGNMENT:  No traumatic malalignment or traumatic facet widening.   VERTEBRAE: Vertebral body heights are maintained. No acute fracture. 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 Thick ossification of the anterior longitudinal ligament and  bridging osteophytes at C3-C4 through C6-C7 compatible with DISH.   SPINAL CANAL/INTERVERTEBRAL DISCS: No high-grade spinal canal stenosis. No significant disc height loss.   NEURAL FORAMINA: Multilevel facet and uncovertebral joint arthropathy result in mild right C4-C5 foraminal stenosis but no high-grade foraminal stenosis.   OTHER: None.       CT HEAD: 1. No acute intracranial abnormality or calvarial fracture.   CT CERVICAL SPINE: 1. No acute fracture or traumatic malalignment of the cervical spine. 2. Redemonstration of DISH without significant canal or foraminal stenosis. 3. Redemonstration of chronic ununited fracture of the C6 spinous process.   I personally reviewed the images/study and I agree with the findings as stated by Resident Yovany Tolliver MD. This study was interpreted at [place], [place].   MACRO: None.   Signed by: Cheyanne Herrera 2/3/2024 1:56 AM Dictation workstation:   [id number]    CT cervical spine wo IV contrast    Result Date: 2/3/2024  Interpreted By:  Cheyanne Herrera and Dervishi Mario STUDY: CT HEAD WO IV CONTRAST; CT CERVICAL SPINE WO IV CONTRAST;  2/3/2024 12:24 am   INDICATION: Signs/Symptoms:Unwitnessed fall, altered mental status   COMPARISON: CT head: 06/27/2023, CT C-spine 01/25/2023   ACCESSION NUMBER(S): [id number]; [id number]   ORDERING CLINICIAN: CHEYANNE REYES   TECHNIQUE: Axial noncontrast CT images of head with coronal and sagittal reconstructed images. Axial noncontrast CT images of the cervical spine with coronal and sagittal reconstructed images.   FINDINGS: CT HEAD:   BRAIN PARENCHYMA: Unchanged calcification of the dentate nuclei and globus pallidi. No acute intraparenchymal hemorrhage or parenchymal evidence of acute large territory ischemic infarct. No mass-effect. Gray-white matter distinction is preserved.   VENTRICLES and EXTRA-AXIAL SPACES:  No acute extra-axial or intraventricular hemorrhage. No effacement  of cerebral sulci. Ventricles and sulci are age-concordant.   PARANASAL SINUSES/MASTOIDS:  No hemorrhage or air-fluid levels within the visualized paranasal sinuses. The mastoids are well aerated.   CALVARIUM/ORBITS:  No skull fracture.  The orbits and globes are intact to the extent visualized.   EXTRACRANIAL SOFT TISSUES: No discernible abnormality.     CT CERVICAL SPINE:   PREVERTEBRAL SOFT TISSUES: Within normal limits.   CRANIOCERVICAL JUNCTION: Intact.   ALIGNMENT:  No traumatic malalignment or traumatic facet widening.   VERTEBRAE: Vertebral body heights are maintained. No acute fracture. 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 Thick ossification of the anterior longitudinal ligament and bridging osteophytes at C3-C4 through C6-C7 compatible with DISH.   SPINAL CANAL/INTERVERTEBRAL DISCS: No high-grade spinal canal stenosis. No significant disc height loss.   NEURAL FORAMINA: Multilevel facet and uncovertebral joint arthropathy result in mild right C4-C5 foraminal stenosis but no high-grade foraminal stenosis.   OTHER: None.       CT HEAD: 1. No acute intracranial abnormality or calvarial fracture.   CT CERVICAL SPINE: 1. No acute fracture or traumatic malalignment of the cervical spine. 2. Redemonstration of DISH without significant canal or foraminal stenosis. 3. Redemonstration of chronic ununited fracture of the C6 spinous process.   I personally reviewed the images/study and I agree with the findings as stated by Resident Yovany Tolliver MD. This study was interpreted at [place], [place].   MACRO: None.   Signed by: Mike Herrera 2/3/2024 1:56 AM Dictation workstation:   [id number]    XR chest 1 view    Result Date: 2/3/2024  STUDY: Chest Radiograph;  2/2/2024 11:56 PM INDICATION: Fall and altered mental status. COMPARISON: 1/30/2024 XR Chest two view ACCESSION NUMBER(S): [id number] ORDERING CLINICIAN: Mike Nino TECHNIQUE:   Frontal chest was obtained at 23:56 hours. FINDINGS: CARDIOMEDIASTINAL SILHOUETTE: Cardiomediastinal silhouette is enlarged in size and configuration. Right IJ central venous catheter is seen with the tip in the upper SVC.  LUNGS: Increased interstitial markings are seen bilaterally with airspace disease at the left lung base and along left cardiac border.  Left pleural effusion may be present.  ABDOMEN: No remarkable upper abdominal findings.  BONES: No acute osseous changes.    Cardiomegaly with increased interstitial markings bilaterally and left basilar airspace disease with question of a left effusion.  CHF is a consideration however left lower lobe pneumonia is suspected in the appropriate clinical setting. Signed by Jhon Stanton    XR chest 2 views    Result Date: 1/30/2024  Interpreted By:  Luis Taveras and Ritchie Brandon STUDY: XR CHEST 2 VIEWS;  1/30/2024 3:24 pm   INDICATION: Signs/Symptoms:evaluate for effusion.   COMPARISON: Chest x-ray 01/26/2024   ACCESSION NUMBER(S): [id number]   ORDERING CLINICIAN: MARTHA REIS   FINDINGS: PA and lateral radiographs of the chest were provided.  Additional PA dual energy images were also provided.   Right internal jugular central venous catheter tip projects over the expected location of the right brachiocephalic vein.   CARDIOMEDIASTINAL SILHOUETTE: Cardiomediastinal silhouette is enlarged but stable in size and configuration.   LUNGS: Expiratory radiograph/low lung volumes are again contributing to bronchovascular crowding. Mildly improved hazy consolidation of the left upper lung zone consistent with known airspace disease seen on prior CT of the chest from 01/25/2024. Compared to prior chest x-ray from 01/26/2024, there is improving bilateral pleural effusions and bibasilar atelectasis.   ABDOMEN: No remarkable upper abdominal findings.   BONES: No acute osseous changes.       1. Mildly improving bilateral pleural effusions, left-greater-than-right with  adjacent bibasilar atelectasis. Continued follow-up to resolution recommended. 2. Mildly improved hazy consolidation involving the left upper lung zone consistent with prior CT of the chest from 2024 and likely suggestive of infectious process. 3. Similar cardiomegaly   I personally reviewed the images/study and I agree with the findings as stated by resident Miguel Angel Ortiz. This study was interpreted at [place], [place].   MACRO: None   Signed by: Luis Taveras 2024 8:09 PM Dictation workstation:   [id number]    Thoracentesis    Result Date: 2024  Chest Ultrasound Note DATE OF SERVICE: 2024   PROCEDURE LOCATION: [facility] PATIENT IDENTIFIERS: Nuris Squires : 1965 MRN: [id number]   REFERRING PROVIDER: PRE-PROCEDURE DIAGNOSIS:  1. Bilateral pleural effusions  POST-PROCEDURE DIAGNOSES:  1. Bilateral pleural effusions   INDICATION: 1. Bilateral pleural effusions  PROCEDURE(S) PERFORMED:   Thoracic ultrasound - Bilateral STUDY PERFORMED BY: Sindi Green MD, Silvio Early MD IMAGES SAVED: Scanned to Media, uploaded to EMR LATERALITY: Thoracic ultrasound -  Bilateral  POSITIONING: _x_ Seated __ Lateral decubitus __ Recumbent PRESENCE OF FLUID: Yes  SLIDING: Present QUANTIFICATION OF FLUID: Trace on the Right, Small on the Let FLUID CHARACTERISTICS: Anechoic PLEURAL FLUID COMPLEXITY: None DIAPHRAGMATIC EXCURSION - RIGHT: Good DIAPHRAGMATIC EXCURSION - LEFT: Good  COMMENTS: Given concern for potential pleural effusion, the patient underwent bilateral chest ultrasound. Patient was examined by chest ultrasound in the seated position, using a phased array probe. Gravity dependant areas along the posterior hemithorax starting from the para spine to the mid axilla were analyzed looking at the sub-scapular space, posterior axilla and mid axilla.  FINDINGS: 1. Trace Right sided pleural Effusion 2. Small Left sided pleural  Effusion 3. Given small amounts of fluid in bilateral pleural spaces and improved symptoms, thoracentesis was not performed. Sindi Green MD 01/29/24 ============= I was present for the entirety of the procedure(s). Silvio Early MD     Vascular US Lower Extremity Venous Duplex Bilateral    Result Date: 1/29/2024            [place] [address]   Tel [phone] and Fax [phone]  [facility]ATERAL  Patient Name:     ALBERT RIDLEY        Reading           [id number] Silvio Zimmerman                                       Physician:        MD Study Date:       1/29/2024           Ordering          [id number] EFREN BLACKMON                                       Physician: MRN/PID:          [id number]            Technologist:     Jeannie Campbell [facility] Accession#:       [id number]        Technologist 2: Date of           1965 / 58      Encounter#:       [id number] Birth/Age:        years Gender:           F Admission Status: Inpatient           Location          [facility]                                       Performed:  Diagnosis/ICD: Other specified soft tissue disorders-M79.89 CPT Codes:     55904 Peripheral venous duplex scan for DVT complete  CONCLUSIONS: No DVT identified  Imaging & Doppler Findings:  Right                 Compressible Thrombus        Flow Distal External Iliac                None       Pulsatile CFV                       Yes        None   Spontaneous/Phasic PFV                       Yes        None FV Proximal               Yes        None   Spontaneous/Phasic FV Mid                    Yes        None FV Distal                 Yes        None Popliteal                 Yes        None   Spontaneous/Phasic Peroneal                  Yes        None PTV                       Yes        None  Left                  Compress Thrombus        Flow Distal External Iliac            None        Pulsatile CFV                     Yes      None   Spontaneous/Phasic PFV                     Yes      None FV Proximal             Yes      None   Spontaneous/Phasic FV Mid                  Yes      None FV Distal               Yes      None Popliteal               Yes      None   Spontaneous/Phasic Peroneal                Yes      None PTV                     Yes      None  [id number] Silvio Zimmerman MD Electronically signed by [id number] Silvio Zimmerman MD on 1/29/2024 at 4:33:49 PM  ** Final **     XR chest 1 view    Result Date: 1/26/2024  Interpreted By:  Sherita Ansari and Liller Gregory STUDY: XR CHEST 1 VIEW;  1/26/2024 11:39 am   INDICATION: Signs/Symptoms:o2 req.   COMPARISON: Chest radiograph 01/24/2024 and CT chest 01/25/2024.   ACCESSION NUMBER(S): [id number]   ORDERING CLINICIAN: ANA LILIA GEORGE   FINDINGS: AP radiograph of the chest was provided.   Right internal jugular central venous catheter tip projects over the expected location of the right brachiocephalic vein.   CARDIOMEDIASTINAL SILHOUETTE: Cardiomediastinal silhouette is enlarged.   LUNGS: Low lung volumes contributing to bronchovascular crowding. Hazy opacities in the left upper lung zone compatible with known pneumonic infiltrates as noted on the previous CT chest dated 01/25/2024. Additional opacification of the bilateral lower lung zones likely correlates with known effusions and atelectasis. No pneumothorax.   ABDOMEN: No remarkable upper abdominal findings.   BONES: No osseous injury.       1. Persistent findings of left upper lobe pneumonia. 2. Redemonstration of bilateral lower lobe effusions and atelectasis. 3. Cardiomegaly.     I personally reviewed the images/study and I agree with the findings as stated above by resident physician, Dr. Rubens Lowe. The study was interpreted at [facility] in [facility].   MACRO: none.   Signed by: Sherita Ansari 1/26/2024 3:03 PM Dictation workstation:    [id number]    Transthoracic Echo (TTE) Complete    Result Date: 1/25/2024   [facility], [address] [place] [address]                Tel [phone] and Fax [phone] TRANSTHORACIC ECHOCARDIOGRAM REPORT  Patient Name:      ALBERT RIDLEY      [place] Physician:   [id number] Payam Salazar MD Study Date:        1/25/2024           Ordering Provider:   [id number] EFREN BLACKMON MRN/PID:           [id number]            Fellow: Accession#:        [id number]        Nurse: Date of Birth/Age: 1965 / 58      Sonographer:         Ainsley Hong RDCS                    years Gender:            F                   Additional Staff: Height:            157.48 cm           Admit Date:          1/24/2024 Weight:            102.06 kg           Admission Status:    Inpatient - Routine BSA:               2.01 m2             Encounter#:          [id number]                                        [address] Blood Pressure: 172 /105 mmHg Study Type:    TRANSTHORACIC ECHO (TTE) COMPLETE Diagnosis/ICD: Pleural Effusion-J90 Indication:    pleural effusion, HF d/t valvular disease, acute, diastolic CPT Code:      Echo Complete w Full Doppler-32033 Patient History: Pertinent History: SOB, HTN, HLD, PAD, Sickle-Cell, DM II. Study Detail: The following Echo studies were performed: 2D, M-Mode, Doppler and               color flow. Technically challenging study due to poor acoustic               windows, prominent lung artifact, patient lying in supine position               and body habitus. Definity used as a contrast agent for               endocardial border definition. Total contrast used for this               procedure was 1 mL via IV push. Unable to obtain suprasternal               notch view.  PHYSICIAN INTERPRETATION: Left Ventricle: The left ventricular systolic function is hyperdynamic, with an estimated ejection fraction of  70-75%. There are no regional wall motion abnormalities. The left ventricular cavity size is normal. The left ventricular septal wall thickness is mildly increased. There is mild concentric left ventricular hypertrophy. Spectral Doppler shows a normal pattern of left ventricular diastolic filling. Left Atrium: The left atrium is mildly dilated. Right Ventricle: The right ventricle was not well visualized. There is normal right ventricular global systolic function. Right Atrium: The right atrium is normal in size. Aortic Valve: The aortic valve is trileaflet. There is no evidence of aortic valve regurgitation. The peak instantaneous gradient of the aortic valve is 10.0 mmHg. The mean gradient of the aortic valve is 5.0 mmHg. Mitral Valve: The mitral valve is normal in structure. There is trace to mild mitral valve regurgitation. Tricuspid Valve: The tricuspid valve is structurally normal. There is trace to mild tricuspid regurgitation. The Doppler estimated RVSP is within normal limits at 32.4 mmHg. Pulmonic Valve: The pulmonic valve is structurally normal. There is physiologic pulmonic valve regurgitation. Pericardium: There is a trivial pericardial effusion. Pleural: There is left pleural effusion. Aorta: The aortic root is normal.  CONCLUSIONS:  1. Left ventricular systolic function is hyperdynamic with a 70-75% estimated ejection fraction.  2. RVSP within normal limits. QUANTITATIVE DATA SUMMARY: 2D MEASUREMENTS:                           Normal Ranges: Ao Root d:     2.90 cm    (2.0-3.7cm) LAs:           3.35 cm    (2.7-4.0cm) IVSd:          1.25 cm    (0.6-1.1cm) LVPWd:         1.05 cm    (0.6-1.1cm) LVIDd:         4.35 cm    (3.9-5.9cm) LVIDs:         2.85 cm LV Mass Index: 107.0 g/m2 LV % FS        34.5 % LA VOLUME:                               Normal Ranges: LA Vol A4C:        77.5 ml    (22+/-6mL/m2) LA Vol A2C:        132.4 ml LA Vol BP:         101.5 ml LA Vol Index A4C:  38.6ml/m2 LA Vol Index A2C:   65.9 ml/m2 LA Vol Index BP:   50.5 ml/m2 LA Area A4C:       23.7 cm2 LA Area A2C:       30.9 cm2 LA Major Axis A4C: 6.2 cm LA Major Axis A2C: 6.1 cm LA Volume Index:   50.5 ml/m2 AORTA MEASUREMENTS:                    Normal Ranges: Asc Ao, d: 3.20 cm (2.1-3.4cm) LV DIASTOLIC FUNCTION:                            Normal Ranges: MV Peak E:      0.88 m/s   (0.7-1.2 m/s) MV Peak A:      1.10 m/s   (0.42-0.7 m/s) E/A Ratio:      0.80       (1.0-2.2) MV e'           0.08 m/s   (>8.0) MV lateral e'   0.08 m/s MV medial e'    0.08 m/s E/e' Ratio:     10.94      (<8.0) a'              0.10 m/s MV DT:          174 msec   (150-240 msec) PulmV Sys Molina:  55.10 cm/s PulmV Robles Molina: 39.00 cm/s PulmV S/D Molina:  1.40 MITRAL VALVE:                 Normal Ranges: MV DT: 174 msec (150-240msec) AORTIC VALVE:                                    Normal Ranges: AoV Vmax:                1.58 m/s  (<=1.7m/s) AoV Peak PG:             10.0 mmHg (<20mmHg) AoV Mean P.0 mmHg  (1.7-11.5mmHg) LVOT Max Molina:            1.31 m/s  (<=1.1m/s) AoV VTI:                 30.40 cm  (18-25cm) LVOT VTI:                25.90 cm LVOT Diameter:           1.90 cm   (1.8-2.4cm) AoV Area, VTI:           2.42 cm2  (2.5-5.5cm2) AoV Area,Vmax:           2.35 cm2  (2.5-4.5cm2) AoV Dimensionless Index: 0.85  RIGHT VENTRICLE: TAPSE: 19.9 mm RV s'  0.10 m/s TRICUSPID VALVE/RVSP:                             Normal Ranges: Peak TR Velocity: 2.71 m/s RV Syst Pressure: 32.4 mmHg (< 30mmHg) IVC Diam:         1.80 cm PULMONIC VALVE:                         Normal Ranges: PV Accel Time: 107 msec (>120ms) PV Max Molina:    1.0 m/s  (0.6-0.9m/s) PV Max P.2 mmHg Pulmonary Veins: PulmV Robles Molina: 39.00 cm/s PulmV S/D Molina:  1.40 PulmV Sys Molina:  55.10 cm/s  [id number] Payam Salazar MD Electronically signed on 2024 at 12:51:30 PM  ** Final **     CT angio chest for pulmonary embolism    Result Date: 2024  Interpreted By:  Sean Vaughn  and Sharee Pedroza STUDY:  CT ANGIO CHEST FOR PULMONARY EMBOLISM;  1/25/2024 4:16 am   INDICATION: Signs/Symptoms:hypoxia, sob.   COMPARISON: Ultrasound thyroid: 01/27/2023   ACCESSION NUMBER(S): [id number]   ORDERING CLINICIAN: MAXIME RICHARDS   TECHNIQUE: Helical data acquisition of the chest was obtained after intravenous administration of 88 mL of Omnipaque 350, as per PE protocol. Images were reformatted in coronal and sagittal planes. Axial and coronal maximum intensity projection (MIP) images were created and reviewed.   FINDINGS: POTENTIAL LIMITATIONS OF THE STUDY: The assessment is limited by respiratory motion and suboptimal contrast opacification of pulmonary arteries.   HEART AND VESSELS: No discrete filling defects within the main pulmonary artery or its branches to  proximal segmental level. Please note that, assessment of distal segmental and subsegmental branches is limited and small peripheral emboli are not entirely excluded. Main pulmonary artery and its branches are normal in caliber.   The thoracic aorta normal in course and caliber.No significant atherosclerotic changes.No acute aortic pathology. No coronary artery calcifications are seen. Please note, the study is not optimized for evaluation of coronary arteries.   The mild cardiomegaly..There are no findings to suggest right heart strain.   There is no pericardial effusion seen.   MEDIASTINUM AND WESLEY, LOWER NECK AND AXILLA: There is a left thyroid lobe nodule measuring 1.5 cm in largest diameter. No significant abnormality of the right thyroid lobe. No evidence of thoracic lymphadenopathy by CT criteria. Esophagus appears within normal limits as seen.   LUNGS AND AIRWAYS: The trachea and central airways are patent. No endobronchial lesion is seen.   There is large bilateral pleural effusions with compressive atelectasis. There is consolidative opacities and surrounding ground-glass opacities in the left upper lobe. No pneumothorax.   UPPER ABDOMEN: The visualized  subdiaphragmatic structures demonstrate no remarkable findings.     CHEST WALL AND OSSEOUS STRUCTURES: Chest wall is within normal limits. No acute osseous pathology.There are no suspicious osseous lesions.Mild multilevel degenerative changes within visualized spine.       1. No evidence of acute pulmonary embolism to segmental level. Please note that, assessment of subsegmental branches is limited and small peripheral emboli are not entirely excluded. 2. Cardiomegaly and large bilateral pleural effusion and bilateral atelectatic/consolidative opacities and also groundglass opacities and septal thickening compatible with edema. There is also evidence of asymmetric left upper lobe consolidative opacity concerning for superimposed pneumonia.   3.      Stable 1.5 cm left thyroid lobe nodule previously evaluated on 1/27/2023 thyroid ultrasound examination.   I personally reviewed the images/study and I agree with the findings as stated by Resident Yovany Tolliver MD. This study was interpreted at [place], [place].   MACRO: None   Signed by: Sean Vaughn 1/25/2024 4:34 AM Dictation workstation:   [id number]    ECG 12 lead    Result Date: 1/24/2024   Suspect arm lead reversal, interpretation assumes no reversal Sinus tachycardia with Premature supraventricular complexes Left atrial enlargement Right axis deviation Low voltage QRS Cannot rule out Anterior infarct (cited on or before 15-ISAK-2024) Abnormal ECG When compared with ECG of 15-ISAK-2024 02:33, Premature supraventricular complexes are now Present QRS axis Shifted right See ED provider note for full interpretation and clinical correlation Confirmed by Kaye Jay ([id number]) on 1/24/2024 11:57:12 PM    XR chest 1 view    Result Date: 1/24/2024  Interpreted By:  Damien Fulton and Barbat Antonio STUDY: XR CHEST 1 VIEW;  1/24/2024 8:47 pm   INDICATION: Signs/Symptoms:sob.   COMPARISON: Chest radiograph dated 06/27/2023.  CT abdomen dated 08/13/2022.   ACCESSION NUMBER(S): [id number]   ORDERING CLINICIAN: KAR BECKMAN   FINDINGS: AP radiograph of the chest was provided.   Examination is limited due to overlying material projecting over the thorax and due to underpenetration.   CARDIOMEDIASTINAL SILHOUETTE: Cardiomediastinal silhouette is normal in size and configuration. There is a right internal jugular approach central venous catheter terminating in the superior vena cava.   LUNGS: Low lung volumes with resultant bronchovascular crowding. Hazy left basilar opacity with blunting of the left costophrenic angle which may represent atelectasis or infiltrate. No evidence of sizeable pleural effusion.   ABDOMEN: No remarkable upper abdominal findings.   BONES: No acute osseous changes.       Left retrocardiac opacity which may represent atelectasis or infiltrate.   I personally reviewed the images/study and I agree with the findings as stated by Kishore Naylor MD. This study was interpreted at [facility], [place], [place]   MACRO: None   Signed by: Damien Fulton 1/24/2024 9:52 PM Dictation workstation:   [id number]    Point of Care Ultrasound    Result Date: 1/24/2024  Kar Beckman MD     1/24/2024  8:48 PM Performed by: Kar Beckman MD Authorized by: Kar Beckman MD  Respiratory Indications: hypoxia Procedure: Thoracic Ultrasound Findings: R Lung Sliding: The RIGHT chest was evaluated and LUNG SLIDING was visualized. L Lung Sliding: The LEFT chest was evaluated and LUNG SLIDING was visualized. L Effusion: The LEFT chest was evaluated and there was a PLEURAL EFFUSION. Impression: Thorax: The focused thoracic ultrasound exam had ABNORMAL findings as specified. and LEFT pleural effusion and B-lines     Point of Care Ultrasound    Result Date: 1/24/2024  Kar Beckman MD     1/24/2024  8:48 PM Performed by: Kar Beckman MD Authorized by: Kar Beckman MD  Respiratory Indications:  hypoxia Procedure: Cardiac Ultrasound Findings:  Views: parasternal long and parasternal short The pericardial space was visualized and was NEGATIVE for a significant pericardial effusion. LV: LV systolic function was NORMAL. Impression: Cardiac: The focused cardiac ultrasound exam was NORMAL.     Electrocardiogram, 12-lead PRN ACS symptoms    Result Date: 1/24/2024  Normal sinus rhythm Possible Anterior infarct (cited on or before 15-ISAK-2024) Abnormal ECG When compared with ECG of 26-JUN-2023 20:27, Previous ECG has undetermined rhythm, needs review Confirmed by Mimi Lua ([id number]) on 1/24/2024 6:18:58 AM    XR abdomen 1 view    Result Date: 1/16/2024  Interpreted By:  Marcin Vincent, STUDY: XR ABDOMEN 1 VIEW;  1/16/2024 4:53 pm   INDICATION: Signs/Symptoms:Abdominal pain, diarrhea.   COMPARISON: None.   ACCESSION NUMBER(S): [id number]   ORDERING CLINICIAN: KARISSA BARTLETT   FINDINGS: Nonobstructive bowel gas pattern. Limited evaluation of pneumoperitoneum on supine imaging, however no gross evidence of free air is noted.   Visualized lungs are clear.   Osseous structures demonstrate no acute bony changes.       Nonobstructive, nonspecific bowel-gas pattern. No x-ray evidence for acute abnormality.   MACRO: None   Signed by: Marcin Vincent 1/16/2024 8:55 PM Dictation workstation:   [id number]    FL insert tunneled LELE ANUPAM catheter wo port pump > 5 years    Result Date: 1/16/2024  Interpreted By:  Elie Hussein, STUDY: FL INSERT TUNNELED LELE ANUPAM CATHETER WO PORT PUMP > 5 YEARS; US GUIDED PERCUTANEOUS PLACEMENT; 1/16/2024 11:55 am; 1/16/2024 11:38 am   INDICATION: Right foot, requiring extended term intravenous antibiotics; referred for insertion of a tunneled central venous catheter.   COMPARISON: None   ACCESSION NUMBER(S): [id number]; [id number]   ORDERING CLINICIAN: SOHA DELUNA   TECHNIQUE: Ultrasound-guided vascular access Fluoroscopy guided insertion of tunneled central venous catheter without  port Fluoroscopy time:2 seconds Images: 1 Dose: 0.33 mGy air kerma   FINDINGS: Informed consent obtained. Patient positioned supine and connected to physiologic monitoring.  All elements of maximal sterile barrier were utilized including cap, mask, sterile gown, sterile gloves, large sterile drape, hand scrub, 2% chlorhexidine for skin cleaning and sterile ultrasound guidance. Ultrasound of right neck demonstrated patent internal jugular vein. Under ultrasound guidance, access into the vein was established with micro puncture and permanent image archived. Small dermatotomy made few cm below clavicle. A 5 Spanish single lumen cuffed hemodialysis catheter (Palindrome) was tunneled from dermatotomy to venotomy. Introducer sheath exchanged for a peel-away sheath. Distal end of the tunneled catheter advanced centrally through the peel-away sheath. Fluoroscopy confirmed tip of catheter at lower SVC. The catheter aspirated and flushed freely, secured, and covered with dressing. Patient tolerated procedure well.       Ultrasound and fluoroscopy guided insertion of tunneled central venous catheter.     Signed by: Elie Hussein 1/16/2024 12:44 PM Dictation workstation:   [id number]    US guided percutaneous placement    Result Date: 1/16/2024  Interpreted By:  Elie Hussein, STUDY: FL INSERT TUNNELED LELE ANUPAM CATHETER WO PORT PUMP > 5 YEARS; US GUIDED PERCUTANEOUS PLACEMENT; 1/16/2024 11:55 am; 1/16/2024 11:38 am   INDICATION: Right foot, requiring extended term intravenous antibiotics; referred for insertion of a tunneled central venous catheter.   COMPARISON: None   ACCESSION NUMBER(S): [id number]; [id number]   ORDERING CLINICIAN: SOHA DELUNA   TECHNIQUE: Ultrasound-guided vascular access Fluoroscopy guided insertion of tunneled central venous catheter without port Fluoroscopy time:2 seconds Images: 1 Dose: 0.33 mGy air kerma   FINDINGS: Informed consent obtained. Patient positioned supine and connected to physiologic  monitoring.  All elements of maximal sterile barrier were utilized including cap, mask, sterile gown, sterile gloves, large sterile drape, hand scrub, 2% chlorhexidine for skin cleaning and sterile ultrasound guidance. Ultrasound of right neck demonstrated patent internal jugular vein. Under ultrasound guidance, access into the vein was established with micro puncture and permanent image archived. Small dermatotomy made few cm below clavicle. A 5 Italian single lumen cuffed hemodialysis catheter (Palindrome) was tunneled from dermatotomy to venotomy. Introducer sheath exchanged for a peel-away sheath. Distal end of the tunneled catheter advanced centrally through the peel-away sheath. Fluoroscopy confirmed tip of catheter at lower SVC. The catheter aspirated and flushed freely, secured, and covered with dressing. Patient tolerated procedure well.       Ultrasound and fluoroscopy guided insertion of tunneled central venous catheter.     Signed by: Elie Hussein 1/16/2024 12:44 PM Dictation workstation:   [id number]    US renal complete    Result Date: 1/11/2024  Interpreted By:  James Aaron, STUDY: US RENAL COMPLETE   INDICATION: Signs/Symptoms:VENKAT   COMPARISON: CT scan from August 2023   ACCESSION NUMBER(S): [id number]   ORDERING CLINICIAN: CARYN HERRERA   TECHNIQUE: Real-time renal ultrasound was performed.   FINDINGS: There is some limitation, especially for the left kidney, due to patient's body habitus and overlying bowel gas obscuring some detail.   The right kidney measures 11.2 cm and the left kidney 10.5 cm in maximal length. There is normal thickness and echogenicity of the renal cortex bilaterally. No renal calculi are identified. There is no hydronephrosis. No solid renal masses are seen. A minute, less than 5 mm cyst centrally in the left kidney that was reported on the previous CT scan can not be appreciated on today's ultrasound due to aforementioned limitations.   Images of the bladder were also  obtained. The bladder is suboptimally evaluated due to incomplete distention. No bladder mass, stone or debris is identified. Bilateral ureteral jets were demonstrated.       Limited but grossly unremarkable renal ultrasound.   Signed by: James Aaron 1/11/2024 2:45 PM Dictation workstation:   [id number]    Vascular US PVR Without Exercise    Result Date: 1/11/2024           [place] [id number]            Phone [phone]  Vascular Lab Report  VASC US PVR WITHOUT EXERCISE Patient Name:      ALBERT Mullins Physician:  [id number] Virgen Ernandez MD, RPVI Study Date:        1/11/2024            Ordering Provider:  [id number] JENNIFER CARTAGENA MRN/PID:           [id number]             Fellow: Accession#:        [id number]         Technologist:       Judie Robles RVT Date of Birth/Age: 1965 / 58 years Technologist 2: Gender:            F                    Encounter#:         [id number] Admission Status:  Inpatient            Location Performed: [facility]  Diagnosis/ICD: Peripheral vascular disease, unspecified-I73.9 CPT Codes:     28699 Peripheral artery PVR (multi segmental pressure  CONCLUSIONS:  Right Lower PVR: No evidence of arterial occlusive disease in the right lower extremity at rest. Normal digital perfusion noted. Triphasic flow is noted in the right common femoral artery, right popliteal artery, right posterior tibial artery and right dorsalis pedis artery. Left Lower PVR: No evidence of arterial occlusive disease in the left lower extremity at rest. Triphasic flow is noted in the left common femoral artery, left popliteal artery, left posterior tibial artery and left dorsalis pedis artery. Unable to obtain TBI due to half amputation of the great toe and second digit amputation. PPG tracing appears normal.   Comparison: Compared with study from 9/17/2019, no significant change.  Imaging & Doppler Findings:  RIGHT Lower PVR                Pressures Ratios Right High Thigh               210 mmHg  1.76 Right Low Thigh                159 mmHg  1.34 Right Calf                     127 mmHg  1.07 Right Posterior Tibial (Ankle) 141 mmHg  1.18 Right Dorsalis Pedis (Ankle)   129 mmHg  1.08 Right Digit (Great Toe)        141 mmHg  1.18   LEFT Lower PVR                Pressures Ratios Left High Thigh               209 mmHg  1.76 Left Low Thigh                144 mmHg  1.21 Left Calf                     134 mmHg  1.13 Left Posterior Tibial (Ankle) 124 mmHg  1.04 Left Dorsalis Pedis (Ankle)   137 mmHg  1.15                      Right     Left Brachial Pressure 114 mmHg 119 mmHg   [id number] Virgen Ernandez MD, RPVI Electronically signed by [id number] Virgen Ernandez MD, RPMITCHELL on 1/11/2024 at 2:44:43 PM  ** Final **     CT foot right w IV contrast    Result Date: 1/10/2024  Interpreted By:  Kay Lundy, STUDY: CT FOOT RIGHT W IV CONTRAST; ;  1/10/2024 11:31 am   INDICATION: Signs/Symptoms:Rule out abscess.   COMPARISON: None.   ACCESSION NUMBER(S): [id number]   ORDERING CLINICIAN: RUPERTO SOLORZANO   TECHNIQUE: Serial axial CT images obtained of the right foot. Images reformatted in the coronal and sagittal projection.   All CT examinations are performed with 1 or more of the following dose reduction techniques: Automated exposure control, adjustment of mA and/or kv according to patient's size, or use of iterative reconstruction techniques.   FINDINGS: Postoperative changes status post long-stem screw fixation across the 1st ray extending from the 1st metatarsal head through the 1st tarsometatarsal and region of the navicular bone with the proximal screw terminating within the talus. There is solid osseous fusion across the 1st tarsometatarsal articulation. Osseous fragmentation is demonstrated across the navicular bone. Of note, the proximal  screw fixation within the talus demonstrates surrounding lucency with lytic appearing lucency throughout the screw fixation measuring up to 6 mm with loosening of the proximal fixation.   Cannulated screw fixation across the posterior facet of the subtalar joint from the plantar calcaneal tuberosity is intact. No surrounding loosening demonstrated. There is component of solid osseous fusion across the central to lateral margin of the posterior facet of the subtalar joint.   Residual navicular bone demonstrates dorsal subluxation from the talonavicular and naviculocuneiform articulations. There is component of osseous fragmentation of this bone. Remainder of the midfoot osseous structures demonstrate congruent calcaneocuboid articulation. The inter cuneiform articulations demonstrate component of intertarsal osteoarthritis. The Lisfranc joint is congruent. Mild osteoarthritis demonstrated across the tarsometatarsal articulations.   Forefoot metatarsal shafts demonstrate bones to be osteopenic. No cortical or trabecular discontinuity. 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 Remainder of the articulations are unremarkable. Digits distally demonstrate no evidence for fracture. There is skin ulceration along the tip of the 1st digit.. No loss of cortical margin or definite osteomyelitis. However, cellulitis is demonstrated. No abscess formation.           1. Postoperative fixation across the 1st ray with solid osseous fusion across the 1st tarsometatarsal articulation. There is osseous fragmentation of the navicular bone with lytic lucency about the proximal screw fixation in the talus with resultant loosening.   2. Skin ulceration plantar and distal margin of the 1st digit about the 1st distal phalanx with surrounding cellulitis. No definite abscess formation. No loss of cortical margin or definite osteomyelitis.     MACRO: None   Signed by: Kay Lundy 1/10/2024 12:27 PM Dictation workstation:    [id number]    XR foot right 3+ views    Result Date: 1/9/2024  Interpreted By:  Marcin Vincent, STUDY: XR FOOT RIGHT 3+ VIEWS; 1/9/2024 12:18 pm   INDICATION: Signs/Symptoms:increasing pain/wound. Pain worsening wound on the right great toe.   COMPARISON: 12/04/2019   ACCESSION NUMBER(S): [id number]   ORDERING CLINICIAN: LINN AMAYA   TECHNIQUE: Views: Right foot AP, Lat, Oblique   FINDINGS: Arthrodesis with a large intramedullary lonny/screw extending from the diaphysis of the 1st metatarsal through the midfoot and hindfoot to the level of the talus. Mild Doris screw lucency distally within the talus, of doubtful clinical significance. 2nd screw fixates the talus and calcaneus without Doris screw lucency or abnormality. There are chronic degenerative changes of the talus and navicular bones which are slightly progressed from the prior examination. The remainder of the examination shows mild degenerative changes of the 1st metatarsophalangeal joint   There is soft tissue swelling of the right great toe. No bony erosion or underlying gas is seen.       Soft tissue swelling of the right great toe without bony erosion or underlying soft tissue gas.   Chronic appearing degenerative changes. Arthrodesis of the right foot as described in the body of the report.   Signed by: Mracin Vincent 1/9/2024 1:08 PM Dictation workstation:   [id number]         Assessment/Plan   Principal Problem:    Syncope, unspecified syncope type    Nuris Squires is a 58 y.o. female with PMHx of T2DM (HbA1c 13 01/2024) c/b neuropathy, HTN, CKD stage III, multiple foot infections s/p recent partial R big toe amputation in which she grew Group B strep and was on 6 week course of ceftriaxone via tunneled line (Sreena catheter placed 01/15/24, CTX to be continued through 02/21/24), recent admission for PNA on 4L home O2 that presented on 02/02/24 with altered mental status and syncope. Patient noted to have hypoglycemia and was found to be  significantly hypothermic on admission. Of note, patient has had recurrent hypoglycemia since admission and admission blood cultures (4/4) now growing coagulase negative staph, suggesting that bacteremia may be leading to recurrent hypoglycemia. Potential sources include central line infection, secondary infection of toe, or potentially urine (patient notably grew VRE in urine culture on 01/31, but this was thought to be an asymptomatic colonization and was never treated) . Per ID, Serena line can be kept in as Bcx with coag negative Staph likely contaminant and discontinue vancomycin. Should continue ceftriaxone till 2/21 for GBS infection of R foot.      Creatinine 2.07 yesterday, concerning for VENKAT iso CKD3. Prerenal VENKAT more likely. Trialed 1L bolus dose NS on over 2hr. Cr this AM at 1.89. UA positive for 1+ protein, 3+ glucose, and leukocyte esterase, WBC >50, RBC >20. Urine culture with no growth at 1 day. Will keep on ceftriaxone for now.     Updates 2/8/2024:  - Cr today AM 1.89 (down from 2.07 yesterday)  - Encourage PO intake today  - UA positive for leukocyte esterase, WBC >50, RBC >20  - Urine culture with no significant growth at 1 day  - Consult podiatry per wound care  - Will keep Serena line in per ID  - C/w ceftriaxone until 2/21 per ID and discontinue vancomycin  - Given lantus on 5u, give 3u at PM, start 8u lantus daily tomorrow  - Encourage IS use 10x/hr     #Coagulase Negative Staph bacteremia  #s/p R great toe partial amputation  #GBS osteomyelitis  #Hypothermia, resolved  - s/p R great toe partial amputation on 01/12 with podiatry for source control of infection  - intra-op culture grew rare Group B streptococcus  - R chest tunneled line placed 01/15 for 6 weeks of IV ceftriaxone planned end 02/21/2024  - Echo during last admission 01/25 shows LVEF 70-75%, mildly increased LV septal wall thickness, otherwise unremarkable  - Culture data  - Blood cultures x2 02/03/24, MRSE 4/4 bottles  - ID  consulted, appreciate recs  - Switched from Zosyn to Ceftriaxone on 2/4 per ID recs  Plan:  - Repeat blood cultures from 2/5 with no growth at 2 days  - Discontinue IV Vancomycin (2/3-), continue ceftriaxone (2/4-2/21)  - ID not recommending Serena line removal at this time     #T2DM c/b neuropathy, CKD III  #Recurrent hypoglycemia  - A1c 13.1% on 01/24/2024  - Home regimen: lantus 70 units, prandial lispro 10 units TID  - Got 50 units Lantus on evening of 2/3 with AM BG 2/4 in the 20's  - Suspect bacteremia is driving hypoglycemia  Plan:  - hold metformin  - given 5u lantus this AM, plan to give 3u lantus this PM  - increase to 8u daily starting tomorrow        #recent PNA  - discharged home on 4L O2 on 01/31/2024  Plan:  - incentive spirometer   - Wean O2 as tolerated for goal SpO2 > 92%     #HTN  - Continue home clonidine and amlodipine     #DLD  - continue atorvastatin 40 mg  - Continue home aspirin 81 mg (Can discuss stopping with patient as this appears to be for primary prevention)     #VENKAT on CKD Stage III  - baseline Cr around 1.4  - Cr up to 2.07 yesterday  Plan:  - Cr at 1.89 today  - Encourage PO intake today     Diet: Diabetic 75 carb/meal 45 carb/snack  Access: PIV, tunneled line (placed 01/15/24)  DVT prophylaxis: Lovenox BID  Code status: Full code (confirmed on admission)  NOK: Brandon Rousseau (Significant Other)  [phone]          Stefanie Urban, MS-3  Seen and discussed with Dr. Melba Andrew (Resident) and Dr. Austin Nance

## 2024-02-09 LAB
ALBUMIN SERPL BCP-MCNC: 2.9 G/DL (ref 3.4–5)
ANION GAP SERPL CALC-SCNC: 12 MMOL/L (ref 10–20)
APPEARANCE UR: ABNORMAL
BACTERIA BLD CULT: NORMAL
BACTERIA BLD CULT: NORMAL
BASOPHILS # BLD AUTO: 0.05 X10*3/UL (ref 0–0.1)
BASOPHILS NFR BLD AUTO: 1.1 %
BILIRUB UR STRIP.AUTO-MCNC: NEGATIVE MG/DL
BUN SERPL-MCNC: 18 MG/DL (ref 6–23)
CALCIUM SERPL-MCNC: 8.9 MG/DL (ref 8.6–10.6)
CHLORIDE SERPL-SCNC: 109 MMOL/L (ref 98–107)
CO2 SERPL-SCNC: 27 MMOL/L (ref 21–32)
COLOR UR: ABNORMAL
CREAT SERPL-MCNC: 1.8 MG/DL (ref 0.5–1.05)
EGFRCR SERPLBLD CKD-EPI 2021: 32 ML/MIN/1.73M*2
EOSINOPHIL # BLD AUTO: 0.18 X10*3/UL (ref 0–0.7)
EOSINOPHIL NFR BLD AUTO: 3.9 %
ERYTHROCYTE [DISTWIDTH] IN BLOOD BY AUTOMATED COUNT: 13.2 % (ref 11.5–14.5)
GLUCOSE BLD MANUAL STRIP-MCNC: 125 MG/DL (ref 74–99)
GLUCOSE BLD MANUAL STRIP-MCNC: 199 MG/DL (ref 74–99)
GLUCOSE BLD MANUAL STRIP-MCNC: 287 MG/DL (ref 74–99)
GLUCOSE BLD MANUAL STRIP-MCNC: 97 MG/DL (ref 74–99)
GLUCOSE SERPL-MCNC: 108 MG/DL (ref 74–99)
GLUCOSE UR STRIP.AUTO-MCNC: ABNORMAL MG/DL
HCT VFR BLD AUTO: 27.5 % (ref 36–46)
HGB BLD-MCNC: 8.7 G/DL (ref 12–16)
HOLD SPECIMEN: NORMAL
IMM GRANULOCYTES # BLD AUTO: 0.01 X10*3/UL (ref 0–0.7)
IMM GRANULOCYTES NFR BLD AUTO: 0.2 % (ref 0–0.9)
KETONES UR STRIP.AUTO-MCNC: NEGATIVE MG/DL
LEUKOCYTE ESTERASE UR QL STRIP.AUTO: ABNORMAL
LYMPHOCYTES # BLD AUTO: 1.96 X10*3/UL (ref 1.2–4.8)
LYMPHOCYTES NFR BLD AUTO: 43 %
MAGNESIUM SERPL-MCNC: 2.24 MG/DL (ref 1.6–2.4)
MCH RBC QN AUTO: 30.6 PG (ref 26–34)
MCHC RBC AUTO-ENTMCNC: 31.6 G/DL (ref 32–36)
MCV RBC AUTO: 97 FL (ref 80–100)
MONOCYTES # BLD AUTO: 0.53 X10*3/UL (ref 0.1–1)
MONOCYTES NFR BLD AUTO: 11.6 %
MUCOUS THREADS #/AREA URNS AUTO: ABNORMAL /LPF
NEUTROPHILS # BLD AUTO: 1.83 X10*3/UL (ref 1.2–7.7)
NEUTROPHILS NFR BLD AUTO: 40.2 %
NITRITE UR QL STRIP.AUTO: NEGATIVE
NRBC BLD-RTO: 0 /100 WBCS (ref 0–0)
PH UR STRIP.AUTO: 6.5 [PH]
PHOSPHATE SERPL-MCNC: 4.3 MG/DL (ref 2.5–4.9)
PLATELET # BLD AUTO: 386 X10*3/UL (ref 150–450)
POTASSIUM SERPL-SCNC: 4 MMOL/L (ref 3.5–5.3)
PROT UR STRIP.AUTO-MCNC: ABNORMAL MG/DL
RBC # BLD AUTO: 2.84 X10*6/UL (ref 4–5.2)
RBC # UR STRIP.AUTO: ABNORMAL /UL
RBC #/AREA URNS AUTO: >20 /HPF
SODIUM SERPL-SCNC: 144 MMOL/L (ref 136–145)
SP GR UR STRIP.AUTO: 1.01
SQUAMOUS #/AREA URNS AUTO: ABNORMAL /HPF
UROBILINOGEN UR STRIP.AUTO-MCNC: NORMAL MG/DL
WBC # BLD AUTO: 4.6 X10*3/UL (ref 4.4–11.3)
WBC #/AREA URNS AUTO: >50 /HPF

## 2024-02-09 PROCEDURE — 2500000001 HC RX 250 WO HCPCS SELF ADMINISTERED DRUGS (ALT 637 FOR MEDICARE OP)

## 2024-02-09 PROCEDURE — 2500000005 HC RX 250 GENERAL PHARMACY W/O HCPCS

## 2024-02-09 PROCEDURE — 81001 URINALYSIS AUTO W/SCOPE: CPT

## 2024-02-09 PROCEDURE — 85025 COMPLETE CBC W/AUTO DIFF WBC: CPT

## 2024-02-09 PROCEDURE — 87086 URINE CULTURE/COLONY COUNT: CPT

## 2024-02-09 PROCEDURE — 2500000001 HC RX 250 WO HCPCS SELF ADMINISTERED DRUGS (ALT 637 FOR MEDICARE OP): Performed by: EMERGENCY MEDICINE

## 2024-02-09 PROCEDURE — 82947 ASSAY GLUCOSE BLOOD QUANT: CPT

## 2024-02-09 PROCEDURE — 2500000004 HC RX 250 GENERAL PHARMACY W/ HCPCS (ALT 636 FOR OP/ED): Performed by: STUDENT IN AN ORGANIZED HEALTH CARE EDUCATION/TRAINING PROGRAM

## 2024-02-09 PROCEDURE — 80069 RENAL FUNCTION PANEL: CPT

## 2024-02-09 PROCEDURE — 2500000004 HC RX 250 GENERAL PHARMACY W/ HCPCS (ALT 636 FOR OP/ED)

## 2024-02-09 PROCEDURE — 83735 ASSAY OF MAGNESIUM: CPT

## 2024-02-09 PROCEDURE — 99232 SBSQ HOSP IP/OBS MODERATE 35: CPT | Performed by: INTERNAL MEDICINE

## 2024-02-09 PROCEDURE — 2060000001 HC INTERMEDIATE ICU ROOM DAILY

## 2024-02-09 RX ORDER — ALUMINUM HYDROXIDE, MAGNESIUM HYDROXIDE, AND SIMETHICONE 1200; 120; 1200 MG/30ML; MG/30ML; MG/30ML
20 SUSPENSION ORAL 4 TIMES DAILY PRN
Status: DISCONTINUED | OUTPATIENT
Start: 2024-02-09 | End: 2024-02-10 | Stop reason: HOSPADM

## 2024-02-09 RX ADMIN — CEFTRIAXONE SODIUM 2 G: 2 INJECTION, SOLUTION INTRAVENOUS at 18:33

## 2024-02-09 RX ADMIN — ENOXAPARIN SODIUM 40 MG: 100 INJECTION SUBCUTANEOUS at 08:06

## 2024-02-09 RX ADMIN — Medication: at 16:45

## 2024-02-09 RX ADMIN — AMLODIPINE BESYLATE 10 MG: 10 TABLET ORAL at 08:06

## 2024-02-09 RX ADMIN — Medication 400 MG: at 08:06

## 2024-02-09 RX ADMIN — CLONIDINE HYDROCHLORIDE 0.1 MG: 0.1 TABLET ORAL at 20:55

## 2024-02-09 RX ADMIN — CLONIDINE HYDROCHLORIDE 0.1 MG: 0.1 TABLET ORAL at 08:06

## 2024-02-09 RX ADMIN — ATORVASTATIN CALCIUM 40 MG: 40 TABLET, FILM COATED ORAL at 08:06

## 2024-02-09 RX ADMIN — ASPIRIN 81 MG 81 MG: 81 TABLET ORAL at 08:05

## 2024-02-09 RX ADMIN — PANTOPRAZOLE SODIUM 40 MG: 40 TABLET, DELAYED RELEASE ORAL at 08:05

## 2024-02-09 RX ADMIN — ALUMINUM HYDROXIDE, MAGNESIUM HYDROXIDE, AND SIMETHICONE 20 ML: 200; 200; 20 SUSPENSION ORAL at 16:07

## 2024-02-09 RX ADMIN — ENOXAPARIN SODIUM 40 MG: 100 INJECTION SUBCUTANEOUS at 20:55

## 2024-02-09 RX ADMIN — INSULIN LISPRO 1 UNITS: 100 INJECTION, SOLUTION INTRAVENOUS; SUBCUTANEOUS at 18:33

## 2024-02-09 RX ADMIN — NYSTATIN 1 APPLICATION: 100000 POWDER TOPICAL at 08:05

## 2024-02-09 RX ADMIN — INSULIN GLARGINE 8 UNITS: 100 INJECTION, SOLUTION SUBCUTANEOUS at 20:55

## 2024-02-09 RX ADMIN — SENNOSIDES AND DOCUSATE SODIUM 2 TABLET: 8.6; 5 TABLET ORAL at 20:56

## 2024-02-09 RX ADMIN — NYSTATIN 1 APPLICATION: 100000 POWDER TOPICAL at 21:20

## 2024-02-09 RX ADMIN — Medication 2000 UNITS: at 08:06

## 2024-02-09 ASSESSMENT — COGNITIVE AND FUNCTIONAL STATUS - GENERAL
DAILY ACTIVITIY SCORE: 20
HELP NEEDED FOR BATHING: A LITTLE
DRESSING REGULAR UPPER BODY CLOTHING: A LITTLE
STANDING UP FROM CHAIR USING ARMS: A LITTLE
MOVING TO AND FROM BED TO CHAIR: A LITTLE
TURNING FROM BACK TO SIDE WHILE IN FLAT BAD: A LITTLE
MOBILITY SCORE: 18
CLIMB 3 TO 5 STEPS WITH RAILING: A LOT
PERSONAL GROOMING: A LITTLE
WALKING IN HOSPITAL ROOM: A LITTLE
TOILETING: A LITTLE

## 2024-02-09 ASSESSMENT — PAIN SCALES - GENERAL: PAINLEVEL_OUTOF10: 0 - NO PAIN

## 2024-02-09 NOTE — PROGRESS NOTES
"Nuris Squires is a 58 y.o. female on day 6 of admission presenting with Syncope, unspecified syncope type.    Subjective   NAEON. Patient doing well this AM, but endorsing slight HA. Denies fevers, chills, NS. Denies dysuria or trouble with urination. Still endorsing food sticking and not digesting properly in her stomach, but no N/V.       Objective     Physical Exam  Cardiovascular:      Rate and Rhythm: Normal rate and regular rhythm.   Pulmonary:      Breath sounds: Normal breath sounds.   Abdominal:      General: Abdomen is flat. Bowel sounds are normal.      Palpations: Abdomen is soft.      Tenderness: There is abdominal tenderness in the epigastric area.   Musculoskeletal:      Comments: R big toe amputation and L second toe amputation. Feet appear dry and flaky, but with no erythema, tenderness or purulence.     Last Recorded Vitals  Blood pressure 169/83, pulse 67, temperature 36.9 °C (98.4 °F), resp. rate 18, height 1.575 m (5' 2\"), weight 102 kg (225 lb), SpO2 95 %.  Intake/Output last 3 Shifts:  I/O last 3 completed shifts:  In: 600 (5.9 mL/kg) [P.O.:600]  Out: 1 (0 mL/kg) [Urine:1 (0 mL/kg/hr)]  Weight: 102.1 kg     Relevant Results    Scheduled medications  amLODIPine, 10 mg, oral, Daily  aspirin, 81 mg, oral, Daily  atorvastatin, 40 mg, oral, Daily  cefTRIAXone, 2 g, intravenous, q24h  cholecalciferol, 2,000 Units, oral, Daily  cloNIDine, 0.1 mg, oral, BID  enoxaparin, 40 mg, subcutaneous, q12h NEHEMIAS  insulin glargine, 8 Units, subcutaneous, q24h  insulin lispro, 0-5 Units, subcutaneous, TID with meals  magnesium oxide, 400 mg, oral, Daily  nystatin, 1 Application, Topical, BID  oxygen, , inhalation, q24h  pantoprazole, 40 mg, oral, Daily before breakfast  sennosides-docusate sodium, 2 tablet, oral, BID      Continuous medications     PRN medications  PRN medications: acetaminophen, albuterol, dextrose 10 % in water (D10W), dextrose, glucagon, meclizine, oxygen    Results for orders placed or " performed during the hospital encounter of 02/02/24 (from the past 24 hour(s))   POCT GLUCOSE   Result Value Ref Range    POCT Glucose 173 (H) 74 - 99 mg/dL   POCT GLUCOSE   Result Value Ref Range    POCT Glucose 175 (H) 74 - 99 mg/dL   POCT GLUCOSE   Result Value Ref Range    POCT Glucose 218 (H) 74 - 99 mg/dL   Urinalysis with Reflex Culture and Microscopic   Result Value Ref Range    Color, Urine Light-Yellow Light-Yellow, Yellow, Dark-Yellow    Appearance, Urine Turbid (N) Clear    Specific Gravity, Urine 1.011 1.005 - 1.035    pH, Urine 6.5 5.0, 5.5, 6.0, 6.5, 7.0, 7.5, 8.0    Protein, Urine 70 (1+) (A) NEGATIVE, 10 (TRACE), 20 (TRACE) mg/dL    Glucose, Urine 300 (3+) (A) Normal mg/dL    Blood, Urine 0.03 (TRACE) (A) NEGATIVE    Ketones, Urine NEGATIVE NEGATIVE mg/dL    Bilirubin, Urine NEGATIVE NEGATIVE    Urobilinogen, Urine Normal Normal mg/dL    Nitrite, Urine NEGATIVE NEGATIVE    Leukocyte Esterase, Urine 500 Rashi/µL (A) NEGATIVE   Microscopic Only, Urine   Result Value Ref Range    WBC, Urine >50 (A) 1-5, NONE /HPF    RBC, Urine >20 (A) NONE, 1-2, 3-5 /HPF    Squamous Epithelial Cells, Urine 1-9 (SPARSE) Reference range not established. /HPF    Mucus, Urine FEW Reference range not established. /LPF   POCT GLUCOSE   Result Value Ref Range    POCT Glucose 97 74 - 99 mg/dL   CBC and Auto Differential   Result Value Ref Range    WBC 4.6 4.4 - 11.3 x10*3/uL    nRBC 0.0 0.0 - 0.0 /100 WBCs    RBC 2.84 (L) 4.00 - 5.20 x10*6/uL    Hemoglobin 8.7 (L) 12.0 - 16.0 g/dL    Hematocrit 27.5 (L) 36.0 - 46.0 %    MCV 97 80 - 100 fL    MCH 30.6 26.0 - 34.0 pg    MCHC 31.6 (L) 32.0 - 36.0 g/dL    RDW 13.2 11.5 - 14.5 %    Platelets 386 150 - 450 x10*3/uL    Neutrophils % 40.2 40.0 - 80.0 %    Immature Granulocytes %, Automated 0.2 0.0 - 0.9 %    Lymphocytes % 43.0 13.0 - 44.0 %    Monocytes % 11.6 2.0 - 10.0 %    Eosinophils % 3.9 0.0 - 6.0 %    Basophils % 1.1 0.0 - 2.0 %    Neutrophils Absolute 1.83 1.20 - 7.70 x10*3/uL     Immature Granulocytes Absolute, Automated 0.01 0.00 - 0.70 x10*3/uL    Lymphocytes Absolute 1.96 1.20 - 4.80 x10*3/uL    Monocytes Absolute 0.53 0.10 - 1.00 x10*3/uL    Eosinophils Absolute 0.18 0.00 - 0.70 x10*3/uL    Basophils Absolute 0.05 0.00 - 0.10 x10*3/uL   Renal function panel   Result Value Ref Range    Glucose 108 (H) 74 - 99 mg/dL    Sodium 144 136 - 145 mmol/L    Potassium 4.0 3.5 - 5.3 mmol/L    Chloride 109 (H) 98 - 107 mmol/L    Bicarbonate 27 21 - 32 mmol/L    Anion Gap 12 10 - 20 mmol/L    Urea Nitrogen 18 6 - 23 mg/dL    Creatinine 1.80 (H) 0.50 - 1.05 mg/dL    eGFR 32 (L) >60 mL/min/1.73m*2    Calcium 8.9 8.6 - 10.6 mg/dL    Phosphorus 4.3 2.5 - 4.9 mg/dL    Albumin 2.9 (L) 3.4 - 5.0 g/dL   Magnesium   Result Value Ref Range    Magnesium 2.24 1.60 - 2.40 mg/dL     ECG 12 Lead    Result Date: 2/3/2024  Normal sinus rhythm Possible Anterior infarct (cited on or before 15-ISAK-2024) Abnormal ECG When compared with ECG of 24-JAN-2024 20:35, Premature supraventricular complexes are no longer Present QRS axis Shifted left Confirmed by Abhinav Wallace ([id number]) on 2/3/2024 11:09:00 PM    CT head wo IV contrast    Result Date: 2/3/2024  Interpreted By:  Cheyanne Herrera and Dervishi Mario STUDY: CT HEAD WO IV CONTRAST; CT CERVICAL SPINE WO IV CONTRAST;  2/3/2024 12:24 am   INDICATION: Signs/Symptoms:Unwitnessed fall, altered mental status   COMPARISON: CT head: 06/27/2023, CT C-spine 01/25/2023   ACCESSION NUMBER(S): [id number]; [id number]   ORDERING CLINICIAN: CHEYANNE REYES   TECHNIQUE: Axial noncontrast CT images of head with coronal and sagittal reconstructed images. Axial noncontrast CT images of the cervical spine with coronal and sagittal reconstructed images.   FINDINGS: CT HEAD:   BRAIN PARENCHYMA: Unchanged calcification of the dentate nuclei and globus pallidi. No acute intraparenchymal hemorrhage or parenchymal evidence of acute large territory ischemic infarct. No mass-effect. Gray-white  matter distinction is preserved.   VENTRICLES and EXTRA-AXIAL SPACES:  No acute extra-axial or intraventricular hemorrhage. No effacement of cerebral sulci. Ventricles and sulci are age-concordant.   PARANASAL SINUSES/MASTOIDS:  No hemorrhage or air-fluid levels within the visualized paranasal sinuses. The mastoids are well aerated.   CALVARIUM/ORBITS:  No skull fracture.  The orbits and globes are intact to the extent visualized.   EXTRACRANIAL SOFT TISSUES: No discernible abnormality.     CT CERVICAL SPINE:   PREVERTEBRAL SOFT TISSUES: Within normal limits.   CRANIOCERVICAL JUNCTION: Intact.   ALIGNMENT:  No traumatic malalignment or traumatic facet widening.   VERTEBRAE: Vertebral body heights are maintained. No acute fracture. 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 Thick ossification of the anterior longitudinal ligament and bridging osteophytes at C3-C4 through C6-C7 compatible with DISH.   SPINAL CANAL/INTERVERTEBRAL DISCS: No high-grade spinal canal stenosis. No significant disc height loss.   NEURAL FORAMINA: Multilevel facet and uncovertebral joint arthropathy result in mild right C4-C5 foraminal stenosis but no high-grade foraminal stenosis.   OTHER: None.       CT HEAD: 1. No acute intracranial abnormality or calvarial fracture.   CT CERVICAL SPINE: 1. No acute fracture or traumatic malalignment of the cervical spine. 2. Redemonstration of DISH without significant canal or foraminal stenosis. 3. Redemonstration of chronic ununited fracture of the C6 spinous process.   I personally reviewed the images/study and I agree with the findings as stated by Resident Yovany Tolliver MD. This study was interpreted at [place], [place].   MACRO: None.   Signed by: Mike Herrera 2/3/2024 1:56 AM Dictation workstation:   [id number]    CT cervical spine wo IV contrast    Result Date: 2/3/2024  Interpreted By:  Mike Herrera,  evelin Tolliver  Yovany STUDY: CT HEAD WO IV CONTRAST; CT CERVICAL SPINE WO IV CONTRAST;  2/3/2024 12:24 am   INDICATION: Signs/Symptoms:Unwitnessed fall, altered mental status   COMPARISON: CT head: 06/27/2023, CT C-spine 01/25/2023   ACCESSION NUMBER(S): [id number]; [id number]   ORDERING CLINICIAN: CHEYANNE REYES   TECHNIQUE: Axial noncontrast CT images of head with coronal and sagittal reconstructed images. Axial noncontrast CT images of the cervical spine with coronal and sagittal reconstructed images.   FINDINGS: CT HEAD:   BRAIN PARENCHYMA: Unchanged calcification of the dentate nuclei and globus pallidi. No acute intraparenchymal hemorrhage or parenchymal evidence of acute large territory ischemic infarct. No mass-effect. Gray-white matter distinction is preserved.   VENTRICLES and EXTRA-AXIAL SPACES:  No acute extra-axial or intraventricular hemorrhage. No effacement of cerebral sulci. Ventricles and sulci are age-concordant.   PARANASAL SINUSES/MASTOIDS:  No hemorrhage or air-fluid levels within the visualized paranasal sinuses. The mastoids are well aerated.   CALVARIUM/ORBITS:  No skull fracture.  The orbits and globes are intact to the extent visualized.   EXTRACRANIAL SOFT TISSUES: No discernible abnormality.     CT CERVICAL SPINE:   PREVERTEBRAL SOFT TISSUES: Within normal limits.   CRANIOCERVICAL JUNCTION: Intact.   ALIGNMENT:  No traumatic malalignment or traumatic facet widening.   VERTEBRAE: Vertebral body heights are maintained. No acute fracture. 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 Thick ossification of the anterior longitudinal ligament and bridging osteophytes at C3-C4 through C6-C7 compatible with DISH.   SPINAL CANAL/INTERVERTEBRAL DISCS: No high-grade spinal canal stenosis. No significant disc height loss.   NEURAL FORAMINA: Multilevel facet and uncovertebral joint arthropathy result in mild right C4-C5 foraminal stenosis but no high-grade foraminal stenosis.   OTHER: None.        CT HEAD: 1. No acute intracranial abnormality or calvarial fracture.   CT CERVICAL SPINE: 1. No acute fracture or traumatic malalignment of the cervical spine. 2. Redemonstration of DISH without significant canal or foraminal stenosis. 3. Redemonstration of chronic ununited fracture of the C6 spinous process.   I personally reviewed the images/study and I agree with the findings as stated by Resident Yovany Tolliver MD. This study was interpreted at [place], [place].   MACRO: None.   Signed by: Mike Herrera 2/3/2024 1:56 AM Dictation workstation:   [id number]    XR chest 1 view    Result Date: 2/3/2024  STUDY: Chest Radiograph;  2/2/2024 11:56 PM INDICATION: Fall and altered mental status. COMPARISON: 1/30/2024 XR Chest two view ACCESSION NUMBER(S): [id number] ORDERING CLINICIAN: Mike Nino TECHNIQUE:  Frontal chest was obtained at 23:56 hours. FINDINGS: CARDIOMEDIASTINAL SILHOUETTE: Cardiomediastinal silhouette is enlarged in size and configuration. Right IJ central venous catheter is seen with the tip in the upper SVC.  LUNGS: Increased interstitial markings are seen bilaterally with airspace disease at the left lung base and along left cardiac border.  Left pleural effusion may be present.  ABDOMEN: No remarkable upper abdominal findings.  BONES: No acute osseous changes.    Cardiomegaly with increased interstitial markings bilaterally and left basilar airspace disease with question of a left effusion.  CHF is a consideration however left lower lobe pneumonia is suspected in the appropriate clinical setting. Signed by Jhon Stanton    XR chest 2 views    Result Date: 1/30/2024  Interpreted By:  Luis Taveras and Ritchie Brandon STUDY: XR CHEST 2 VIEWS;  1/30/2024 3:24 pm   INDICATION: Signs/Symptoms:evaluate for effusion.   COMPARISON: Chest x-ray 01/26/2024   ACCESSION NUMBER(S): [id number]   ORDERING CLINICIAN: MARTHA REIS   FINDINGS: PA and  lateral radiographs of the chest were provided.  Additional PA dual energy images were also provided.   Right internal jugular central venous catheter tip projects over the expected location of the right brachiocephalic vein.   CARDIOMEDIASTINAL SILHOUETTE: Cardiomediastinal silhouette is enlarged but stable in size and configuration.   LUNGS: Expiratory radiograph/low lung volumes are again contributing to bronchovascular crowding. Mildly improved hazy consolidation of the left upper lung zone consistent with known airspace disease seen on prior CT of the chest from 2024. Compared to prior chest x-ray from 2024, there is improving bilateral pleural effusions and bibasilar atelectasis.   ABDOMEN: No remarkable upper abdominal findings.   BONES: No acute osseous changes.       1. Mildly improving bilateral pleural effusions, left-greater-than-right with adjacent bibasilar atelectasis. Continued follow-up to resolution recommended. 2. Mildly improved hazy consolidation involving the left upper lung zone consistent with prior CT of the chest from 2024 and likely suggestive of infectious process. 3. Similar cardiomegaly   I personally reviewed the images/study and I agree with the findings as stated by resident Miguel Angel Ortiz. This study was interpreted at [place], [place].   MACRO: None   Signed by: Luis Taveras 2024 8:09 PM Dictation workstation:   [id number]    Thoracentesis    Result Date: 2024  Chest Ultrasound Note DATE OF SERVICE: 2024   PROCEDURE LOCATION: [facility] PATIENT IDENTIFIERS: Nuris Squires : 1965 MRN: [id number]   REFERRING PROVIDER: PRE-PROCEDURE DIAGNOSIS:  1. Bilateral pleural effusions  POST-PROCEDURE DIAGNOSES:  1. Bilateral pleural effusions   INDICATION: 1. Bilateral pleural effusions  PROCEDURE(S) PERFORMED:   Thoracic ultrasound - Bilateral STUDY PERFORMED BY: Sindi Green MD,  Silvio Early MD IMAGES SAVED: Scanned to Media, uploaded to EMR LATERALITY: Thoracic ultrasound -  Bilateral  POSITIONING: _x_ Seated __ Lateral decubitus __ Recumbent PRESENCE OF FLUID: Yes  SLIDING: Present QUANTIFICATION OF FLUID: Trace on the Right, Small on the Let FLUID CHARACTERISTICS: Anechoic PLEURAL FLUID COMPLEXITY: None DIAPHRAGMATIC EXCURSION - RIGHT: Good DIAPHRAGMATIC EXCURSION - LEFT: Good  COMMENTS: Given concern for potential pleural effusion, the patient underwent bilateral chest ultrasound. Patient was examined by chest ultrasound in the seated position, using a phased array probe. Gravity dependant areas along the posterior hemithorax starting from the para spine to the mid axilla were analyzed looking at the sub-scapular space, posterior axilla and mid axilla.  FINDINGS: 1. Trace Right sided pleural Effusion 2. Small Left sided pleural Effusion 3. Given small amounts of fluid in bilateral pleural spaces and improved symptoms, thoracentesis was not performed. Sindi Green MD 01/29/24 ============= I was present for the entirety of the procedure(s). Silvio Early MD     Vascular US Lower Extremity Venous Duplex Bilateral    Result Date: 1/29/2024            [place] [id number]   Tel [phone] and Fax [phone]  Vascular Lab Report VASC US LOWER EXTREMITY VENOUS DUPLEX BILATERAL  Patient Name:     ALBERT Mullins           [id number] Silvio Zimmerman                                       Physician:        MD Study Date:       1/29/2024           Ordering          [id number] EFREN BLACKMON                                       Physician: MRN/PID:          [id number]            Technologist:     Jeannie Campbell T Accession#:       [id number]        Technologist 2: Date of           1965 / 58      Encounter#:       [id number] Birth/Age:        years Gender:           F Admission Status: Inpatient           Location           [facility]                                       Performed:  Diagnosis/ICD: Other specified soft tissue disorders-M79.89 CPT Codes:     09273 Peripheral venous duplex scan for DVT complete  CONCLUSIONS: No DVT identified  Imaging & Doppler Findings:  Right                 Compressible Thrombus        Flow Distal External Iliac                None       Pulsatile CFV                       Yes        None   Spontaneous/Phasic PFV                       Yes        None FV Proximal               Yes        None   Spontaneous/Phasic FV Mid                    Yes        None FV Distal                 Yes        None Popliteal                 Yes        None   Spontaneous/Phasic Peroneal                  Yes        None PTV                       Yes        None  Left                  Compress Thrombus        Flow Distal External Iliac            None       Pulsatile CFV                     Yes      None   Spontaneous/Phasic PFV                     Yes      None FV Proximal             Yes      None   Spontaneous/Phasic FV Mid                  Yes      None FV Distal               Yes      None Popliteal               Yes      None   Spontaneous/Phasic Peroneal                Yes      None PTV                     Yes      None  [id number] Silvio Zimmerman MD Electronically signed by [id number] Silvio Zimmerman MD on 1/29/2024 at 4:33:49 PM  ** Final **     XR chest 1 view    Result Date: 1/26/2024  Interpreted By:  Sherita Ansari and Liller Gregory STUDY: XR CHEST 1 VIEW;  1/26/2024 11:39 am   INDICATION: Signs/Symptoms:o2 req.   COMPARISON: Chest radiograph 01/24/2024 and CT chest 01/25/2024.   ACCESSION NUMBER(S): [id number]   ORDERING CLINICIAN: ANA LILIA GEORGE   FINDINGS: AP radiograph of the chest was provided.   Right internal jugular central venous catheter tip projects over the expected location of the right brachiocephalic vein.   CARDIOMEDIASTINAL SILHOUETTE: Cardiomediastinal silhouette is enlarged.   LUNGS:  Low lung volumes contributing to bronchovascular crowding. Hazy opacities in the left upper lung zone compatible with known pneumonic infiltrates as noted on the previous CT chest dated 01/25/2024. Additional opacification of the bilateral lower lung zones likely correlates with known effusions and atelectasis. No pneumothorax.   ABDOMEN: No remarkable upper abdominal findings.   BONES: No osseous injury.       1. Persistent findings of left upper lobe pneumonia. 2. Redemonstration of bilateral lower lobe effusions and atelectasis. 3. Cardiomegaly.     I personally reviewed the images/study and I agree with the findings as stated above by resident physician, Dr. Rubens Lowe. The study was interpreted at [facility] in [facility].   MACRO: none.   Signed by: Sherita Ansari 1/26/2024 3:03 PM Dictation workstation:   [id number]    Transthoracic Echo (TTE) Complete    Result Date: 1/25/2024   [facility], [address] [place] [address]                Tel [phone] and Fax [phone] TRANSTHORACIC ECHOCARDIOGRAM REPORT  Patient Name:      ALBERT ARIEL Mullins Physician:   [id number] Payam Salazar MD Study Date:        1/25/2024           Ordering Provider:   [id number] EFREN BLACKMON MRN/PID:           [id number]            Fellow: Accession#:        [id number]        Nurse: Date of Birth/Age: 1965 / 58      Sonographer:         Ainsley Hong RDCS                    years Gender:            F                   Additional Staff: Height:            157.48 cm           Admit Date:          1/24/2024 Weight:            102.06 kg           Admission Status:    Inpatient - Routine BSA:               2.01 m2             Encounter#:          [id number]                                        [address] Blood Pressure: 172 /105 mmHg Study Type:    TRANSTHORACIC ECHO  (TTE) COMPLETE Diagnosis/ICD: Pleural Effusion-J90 Indication:    pleural effusion, HF d/t valvular disease, acute, diastolic CPT Code:      Echo Complete w Full Doppler-26574 Patient History: Pertinent History: SOB, HTN, HLD, PAD, Sickle-Cell, DM II. Study Detail: The following Echo studies were performed: 2D, M-Mode, Doppler and               color flow. Technically challenging study due to poor acoustic               windows, prominent lung artifact, patient lying in supine position               and body habitus. Definity used as a contrast agent for               endocardial border definition. Total contrast used for this               procedure was 1 mL via IV push. Unable to obtain suprasternal               notch view.  PHYSICIAN INTERPRETATION: Left Ventricle: The left ventricular systolic function is hyperdynamic, with an estimated ejection fraction of 70-75%. There are no regional wall motion abnormalities. The left ventricular cavity size is normal. The left ventricular septal wall thickness is mildly increased. There is mild concentric left ventricular hypertrophy. Spectral Doppler shows a normal pattern of left ventricular diastolic filling. Left Atrium: The left atrium is mildly dilated. Right Ventricle: The right ventricle was not well visualized. There is normal right ventricular global systolic function. Right Atrium: The right atrium is normal in size. Aortic Valve: The aortic valve is trileaflet. There is no evidence of aortic valve regurgitation. The peak instantaneous gradient of the aortic valve is 10.0 mmHg. The mean gradient of the aortic valve is 5.0 mmHg. Mitral Valve: The mitral valve is normal in structure. There is trace to mild mitral valve regurgitation. Tricuspid Valve: The tricuspid valve is structurally normal. There is trace to mild tricuspid regurgitation. The Doppler estimated RVSP is within normal limits at 32.4 mmHg. Pulmonic Valve: The pulmonic valve is structurally normal.  There is physiologic pulmonic valve regurgitation. Pericardium: There is a trivial pericardial effusion. Pleural: There is left pleural effusion. Aorta: The aortic root is normal.  CONCLUSIONS:  1. Left ventricular systolic function is hyperdynamic with a 70-75% estimated ejection fraction.  2. RVSP within normal limits. QUANTITATIVE DATA SUMMARY: 2D MEASUREMENTS:                           Normal Ranges: Ao Root d:     2.90 cm    (2.0-3.7cm) LAs:           3.35 cm    (2.7-4.0cm) IVSd:          1.25 cm    (0.6-1.1cm) LVPWd:         1.05 cm    (0.6-1.1cm) LVIDd:         4.35 cm    (3.9-5.9cm) LVIDs:         2.85 cm LV Mass Index: 107.0 g/m2 LV % FS        34.5 % LA VOLUME:                               Normal Ranges: LA Vol A4C:        77.5 ml    (22+/-6mL/m2) LA Vol A2C:        132.4 ml LA Vol BP:         101.5 ml LA Vol Index A4C:  38.6ml/m2 LA Vol Index A2C:  65.9 ml/m2 LA Vol Index BP:   50.5 ml/m2 LA Area A4C:       23.7 cm2 LA Area A2C:       30.9 cm2 LA Major Axis A4C: 6.2 cm LA Major Axis A2C: 6.1 cm LA Volume Index:   50.5 ml/m2 AORTA MEASUREMENTS:                    Normal Ranges: Asc Ao, d: 3.20 cm (2.1-3.4cm) LV DIASTOLIC FUNCTION:                            Normal Ranges: MV Peak E:      0.88 m/s   (0.7-1.2 m/s) MV Peak A:      1.10 m/s   (0.42-0.7 m/s) E/A Ratio:      0.80       (1.0-2.2) MV e'           0.08 m/s   (>8.0) MV lateral e'   0.08 m/s MV medial e'    0.08 m/s E/e' Ratio:     10.94      (<8.0) a'              0.10 m/s MV DT:          174 msec   (150-240 msec) PulmV Sys Molina:  55.10 cm/s PulmV Robles Molina: 39.00 cm/s PulmV S/D Molina:  1.40 MITRAL VALVE:                 Normal Ranges: MV DT: 174 msec (150-240msec) AORTIC VALVE:                                    Normal Ranges: AoV Vmax:                1.58 m/s  (<=1.7m/s) AoV Peak PG:             10.0 mmHg (<20mmHg) AoV Mean P.0 mmHg  (1.7-11.5mmHg) LVOT Max Molina:            1.31 m/s  (<=1.1m/s) AoV VTI:                 30.40 cm   (18-25cm) LVOT VTI:                25.90 cm LVOT Diameter:           1.90 cm   (1.8-2.4cm) AoV Area, VTI:           2.42 cm2  (2.5-5.5cm2) AoV Area,Vmax:           2.35 cm2  (2.5-4.5cm2) AoV Dimensionless Index: 0.85  RIGHT VENTRICLE: TAPSE: 19.9 mm RV s'  0.10 m/s TRICUSPID VALVE/RVSP:                             Normal Ranges: Peak TR Velocity: 2.71 m/s RV Syst Pressure: 32.4 mmHg (< 30mmHg) IVC Diam:         1.80 cm PULMONIC VALVE:                         Normal Ranges: PV Accel Time: 107 msec (>120ms) PV Max Molina:    1.0 m/s  (0.6-0.9m/s) PV Max P.2 mmHg Pulmonary Veins: PulmV Robles Molina: 39.00 cm/s PulmV S/D Molina:  1.40 PulmV Sys Molina:  55.10 cm/s  [id number] Payam Salazar MD Electronically signed on 2024 at 12:51:30 PM  ** Final **     CT angio chest for pulmonary embolism    Result Date: 2024  Interpreted By:  Sean Vaughn and Dervishi Mario STUDY: CT ANGIO CHEST FOR PULMONARY EMBOLISM;  2024 4:16 am   INDICATION: Signs/Symptoms:hypoxia, sob.   COMPARISON: Ultrasound thyroid: 2023   ACCESSION NUMBER(S): [id number]   ORDERING CLINICIAN: MAXIME RICHARDS   TECHNIQUE: Helical data acquisition of the chest was obtained after intravenous administration of 88 mL of Omnipaque 350, as per PE protocol. Images were reformatted in coronal and sagittal planes. Axial and coronal maximum intensity projection (MIP) images were created and reviewed.   FINDINGS: POTENTIAL LIMITATIONS OF THE STUDY: The assessment is limited by respiratory motion and suboptimal contrast opacification of pulmonary arteries.   HEART AND VESSELS: No discrete filling defects within the main pulmonary artery or its branches to  proximal segmental level. Please note that, assessment of distal segmental and subsegmental branches is limited and small peripheral emboli are not entirely excluded. Main pulmonary artery and its branches are normal in caliber.   The thoracic aorta normal in course and caliber.No significant  atherosclerotic changes.No acute aortic pathology. No coronary artery calcifications are seen. Please note, the study is not optimized for evaluation of coronary arteries.   The mild cardiomegaly..There are no findings to suggest right heart strain.   There is no pericardial effusion seen.   MEDIASTINUM AND WESLEY, LOWER NECK AND AXILLA: There is a left thyroid lobe nodule measuring 1.5 cm in largest diameter. No significant abnormality of the right thyroid lobe. No evidence of thoracic lymphadenopathy by CT criteria. Esophagus appears within normal limits as seen.   LUNGS AND AIRWAYS: The trachea and central airways are patent. No endobronchial lesion is seen.   There is large bilateral pleural effusions with compressive atelectasis. There is consolidative opacities and surrounding ground-glass opacities in the left upper lobe. No pneumothorax.   UPPER ABDOMEN: The visualized subdiaphragmatic structures demonstrate no remarkable findings.     CHEST WALL AND OSSEOUS STRUCTURES: Chest wall is within normal limits. No acute osseous pathology.There are no suspicious osseous lesions.Mild multilevel degenerative changes within visualized spine.       1. No evidence of acute pulmonary embolism to segmental level. Please note that, assessment of subsegmental branches is limited and small peripheral emboli are not entirely excluded. 2. Cardiomegaly and large bilateral pleural effusion and bilateral atelectatic/consolidative opacities and also groundglass opacities and septal thickening compatible with edema. There is also evidence of asymmetric left upper lobe consolidative opacity concerning for superimposed pneumonia.   3.      Stable 1.5 cm left thyroid lobe nodule previously evaluated on 1/27/2023 thyroid ultrasound examination.   I personally reviewed the images/study and I agree with the findings as stated by Resident Yvoany Tolliver MD. This study was interpreted at [facility],  [place], [place].   MACRO: None   Signed by: Sean Vaughn 1/25/2024 4:34 AM Dictation workstation:   [id number]    ECG 12 lead    Result Date: 1/24/2024   Suspect arm lead reversal, interpretation assumes no reversal Sinus tachycardia with Premature supraventricular complexes Left atrial enlargement Right axis deviation Low voltage QRS Cannot rule out Anterior infarct (cited on or before 15-ISAK-2024) Abnormal ECG When compared with ECG of 15-ISAK-2024 02:33, Premature supraventricular complexes are now Present QRS axis Shifted right See ED provider note for full interpretation and clinical correlation Confirmed by Kaye Jay ([id number]) on 1/24/2024 11:57:12 PM    XR chest 1 view    Result Date: 1/24/2024  Interpreted By:  Damien Fulton,  Joy Novak STUDY: XR CHEST 1 VIEW;  1/24/2024 8:47 pm   INDICATION: Signs/Symptoms:sob.   COMPARISON: Chest radiograph dated 06/27/2023. CT abdomen dated 08/13/2022.   ACCESSION NUMBER(S): [id number]   ORDERING CLINICIAN: KAR WISE   FINDINGS: AP radiograph of the chest was provided.   Examination is limited due to overlying material projecting over the thorax and due to underpenetration.   CARDIOMEDIASTINAL SILHOUETTE: Cardiomediastinal silhouette is normal in size and configuration. There is a right internal jugular approach central venous catheter terminating in the superior vena cava.   LUNGS: Low lung volumes with resultant bronchovascular crowding. Hazy left basilar opacity with blunting of the left costophrenic angle which may represent atelectasis or infiltrate. No evidence of sizeable pleural effusion.   ABDOMEN: No remarkable upper abdominal findings.   BONES: No acute osseous changes.       Left retrocardiac opacity which may represent atelectasis or infiltrate.   I personally reviewed the images/study and I agree with the findings as stated by Kishore Naylor MD. This study was interpreted at [facility], [place], [place]    MACRO: None   Signed by: Damien Fulton 1/24/2024 9:52 PM Dictation workstation:   [id number]    Point of Care Ultrasound    Result Date: 1/24/2024  Rich Beckman MD     1/24/2024  8:48 PM Performed by: Rich Beckman MD Authorized by: Rich Beckman MD  Respiratory Indications: hypoxia Procedure: Thoracic Ultrasound Findings: R Lung Sliding: The RIGHT chest was evaluated and LUNG SLIDING was visualized. L Lung Sliding: The LEFT chest was evaluated and LUNG SLIDING was visualized. L Effusion: The LEFT chest was evaluated and there was a PLEURAL EFFUSION. Impression: Thorax: The focused thoracic ultrasound exam had ABNORMAL findings as specified. and LEFT pleural effusion and B-lines     Point of Care Ultrasound    Result Date: 1/24/2024  Rich Beckman MD     1/24/2024  8:48 PM Performed by: Rich Beckman MD Authorized by: Rich Beckman MD  Respiratory Indications: hypoxia Procedure: Cardiac Ultrasound Findings:  Views: parasternal long and parasternal short The pericardial space was visualized and was NEGATIVE for a significant pericardial effusion. LV: LV systolic function was NORMAL. Impression: Cardiac: The focused cardiac ultrasound exam was NORMAL.     Electrocardiogram, 12-lead PRN ACS symptoms    Result Date: 1/24/2024  Normal sinus rhythm Possible Anterior infarct (cited on or before 15-ISAK-2024) Abnormal ECG When compared with ECG of 26-JUN-2023 20:27, Previous ECG has undetermined rhythm, needs review Confirmed by Mimi Lua ([id number]) on 1/24/2024 6:18:58 AM    XR abdomen 1 view    Result Date: 1/16/2024  Interpreted By:  Marcin Vincent, STUDY: XR ABDOMEN 1 VIEW;  1/16/2024 4:53 pm   INDICATION: Signs/Symptoms:Abdominal pain, diarrhea.   COMPARISON: None.   ACCESSION NUMBER(S): [id number]   ORDERING CLINICIAN: KARISSA BARTLETT   FINDINGS: Nonobstructive bowel gas pattern. Limited evaluation of pneumoperitoneum on supine imaging, however no gross evidence of free air is noted.    Visualized lungs are clear.   Osseous structures demonstrate no acute bony changes.       Nonobstructive, nonspecific bowel-gas pattern. No x-ray evidence for acute abnormality.   MACRO: None   Signed by: Marcin Vincent 1/16/2024 8:55 PM Dictation workstation:   [id number]    FL insert tunneled LELE ANUPAM catheter wo port pump > 5 years    Result Date: 1/16/2024  Interpreted By:  Elie Hussein, STUDY: FL INSERT TUNNELED LELE ANUPAM CATHETER WO PORT PUMP > 5 YEARS; US GUIDED PERCUTANEOUS PLACEMENT; 1/16/2024 11:55 am; 1/16/2024 11:38 am   INDICATION: Right foot, requiring extended term intravenous antibiotics; referred for insertion of a tunneled central venous catheter.   COMPARISON: None   ACCESSION NUMBER(S): [id number]; [id number]   ORDERING CLINICIAN: SOHA DELUNA   TECHNIQUE: Ultrasound-guided vascular access Fluoroscopy guided insertion of tunneled central venous catheter without port Fluoroscopy time:2 seconds Images: 1 Dose: 0.33 mGy air kerma   FINDINGS: Informed consent obtained. Patient positioned supine and connected to physiologic monitoring.  All elements of maximal sterile barrier were utilized including cap, mask, sterile gown, sterile gloves, large sterile drape, hand scrub, 2% chlorhexidine for skin cleaning and sterile ultrasound guidance. Ultrasound of right neck demonstrated patent internal jugular vein. Under ultrasound guidance, access into the vein was established with micro puncture and permanent image archived. Small dermatotomy made few cm below clavicle. A 5 Latvian single lumen cuffed hemodialysis catheter (Palindrome) was tunneled from dermatotomy to venotomy. Introducer sheath exchanged for a peel-away sheath. Distal end of the tunneled catheter advanced centrally through the peel-away sheath. Fluoroscopy confirmed tip of catheter at lower SVC. The catheter aspirated and flushed freely, secured, and covered with dressing. Patient tolerated procedure well.       Ultrasound and  fluoroscopy guided insertion of tunneled central venous catheter.     Signed by: Elie Hussein 1/16/2024 12:44 PM Dictation workstation:   [id number]    US guided percutaneous placement    Result Date: 1/16/2024  Interpreted By:  Elie Hussein, STUDY: FL INSERT TUNNELED LELE ANUPAM CATHETER WO PORT PUMP > 5 YEARS; US GUIDED PERCUTANEOUS PLACEMENT; 1/16/2024 11:55 am; 1/16/2024 11:38 am   INDICATION: Right foot, requiring extended term intravenous antibiotics; referred for insertion of a tunneled central venous catheter.   COMPARISON: None   ACCESSION NUMBER(S): [id number]; [id number]   ORDERING CLINICIAN: SOHA DELUNA   TECHNIQUE: Ultrasound-guided vascular access Fluoroscopy guided insertion of tunneled central venous catheter without port Fluoroscopy time:2 seconds Images: 1 Dose: 0.33 mGy air kerma   FINDINGS: Informed consent obtained. Patient positioned supine and connected to physiologic monitoring.  All elements of maximal sterile barrier were utilized including cap, mask, sterile gown, sterile gloves, large sterile drape, hand scrub, 2% chlorhexidine for skin cleaning and sterile ultrasound guidance. Ultrasound of right neck demonstrated patent internal jugular vein. Under ultrasound guidance, access into the vein was established with micro puncture and permanent image archived. Small dermatotomy made few cm below clavicle. A 5 Ghanaian single lumen cuffed hemodialysis catheter (Palindrome) was tunneled from dermatotomy to venotomy. Introducer sheath exchanged for a peel-away sheath. Distal end of the tunneled catheter advanced centrally through the peel-away sheath. Fluoroscopy confirmed tip of catheter at lower SVC. The catheter aspirated and flushed freely, secured, and covered with dressing. Patient tolerated procedure well.       Ultrasound and fluoroscopy guided insertion of tunneled central venous catheter.     Signed by: Elie Hussein 1/16/2024 12:44 PM Dictation workstation:   [id number]    US  renal complete    Result Date: 1/11/2024  Interpreted By:  James Aaron, STUDY: US RENAL COMPLETE   INDICATION: Signs/Symptoms:VENKAT   COMPARISON: CT scan from August 2023   ACCESSION NUMBER(S): [id number]   ORDERING CLINICIAN: CARYN HERRERA   TECHNIQUE: Real-time renal ultrasound was performed.   FINDINGS: There is some limitation, especially for the left kidney, due to patient's body habitus and overlying bowel gas obscuring some detail.   The right kidney measures 11.2 cm and the left kidney 10.5 cm in maximal length. There is normal thickness and echogenicity of the renal cortex bilaterally. No renal calculi are identified. There is no hydronephrosis. No solid renal masses are seen. A minute, less than 5 mm cyst centrally in the left kidney that was reported on the previous CT scan can not be appreciated on today's ultrasound due to aforementioned limitations.   Images of the bladder were also obtained. The bladder is suboptimally evaluated due to incomplete distention. No bladder mass, stone or debris is identified. Bilateral ureteral jets were demonstrated.       Limited but grossly unremarkable renal ultrasound.   Signed by: James Aaron 1/11/2024 2:45 PM Dictation workstation:   [id number]    Vascular US PVR Without Exercise    Result Date: 1/11/2024           [place] [address]            Phone [phone]  Vascular Lab Report  VASC US PVR WITHOUT EXERCISE Patient Name:      ALBERTALEM Mullins Physician:  [id number] Virgen Ernandez MD, RPVI Study Date:        1/11/2024            Ordering Provider:  [id number] JENNIFER CARTAGENA MRN/PID:           [id number]             Fellow: Accession#:        [id number]         Technologist:       Judie Robles RVT Date of Birth/Age: 1965 / 58 years Technologist 2: Gender:            F                     Encounter#:         [id number] Admission Status:  Inpatient            Location Performed: [facility]  Diagnosis/ICD: Peripheral vascular disease, unspecified-I73.9 CPT Codes:     64864 Peripheral artery PVR (multi segmental pressure  CONCLUSIONS:  Right Lower PVR: No evidence of arterial occlusive disease in the right lower extremity at rest. Normal digital perfusion noted. Triphasic flow is noted in the right common femoral artery, right popliteal artery, right posterior tibial artery and right dorsalis pedis artery. Left Lower PVR: No evidence of arterial occlusive disease in the left lower extremity at rest. Triphasic flow is noted in the left common femoral artery, left popliteal artery, left posterior tibial artery and left dorsalis pedis artery. Unable to obtain TBI due to half amputation of the great toe and second digit amputation. PPG tracing appears normal.  Comparison: Compared with study from 9/17/2019, no significant change.  Imaging & Doppler Findings:  RIGHT Lower PVR                Pressures Ratios Right High Thigh               210 mmHg  1.76 Right Low Thigh                159 mmHg  1.34 Right Calf                     127 mmHg  1.07 Right Posterior Tibial (Ankle) 141 mmHg  1.18 Right Dorsalis Pedis (Ankle)   129 mmHg  1.08 Right Digit (Great Toe)        141 mmHg  1.18   LEFT Lower PVR                Pressures Ratios Left High Thigh               209 mmHg  1.76 Left Low Thigh                144 mmHg  1.21 Left Calf                     134 mmHg  1.13 Left Posterior Tibial (Ankle) 124 mmHg  1.04 Left Dorsalis Pedis (Ankle)   137 mmHg  1.15                      Right     Left Brachial Pressure 114 mmHg 119 mmHg   [id number] Virgen Ernandez MD, RPVI Electronically signed by [id number] Virgen Ernandez MD, RPVI on 1/11/2024 at 2:44:43 PM  ** Final **     CT foot right w IV contrast    Result Date: 1/10/2024  Interpreted By:  Kay Lundy, STUDY: CT FOOT RIGHT W IV CONTRAST; ;  1/10/2024 11:31  am   INDICATION: Signs/Symptoms:Rule out abscess.   COMPARISON: None.   ACCESSION NUMBER(S): [id number]   ORDERING CLINICIAN: RUPERTO SOLORZANO   TECHNIQUE: Serial axial CT images obtained of the right foot. Images reformatted in the coronal and sagittal projection.   All CT examinations are performed with 1 or more of the following dose reduction techniques: Automated exposure control, adjustment of mA and/or kv according to patient's size, or use of iterative reconstruction techniques.   FINDINGS: Postoperative changes status post long-stem screw fixation across the 1st ray extending from the 1st metatarsal head through the 1st tarsometatarsal and region of the navicular bone with the proximal screw terminating within the talus. There is solid osseous fusion across the 1st tarsometatarsal articulation. Osseous fragmentation is demonstrated across the navicular bone. Of note, the proximal screw fixation within the talus demonstrates surrounding lucency with lytic appearing lucency throughout the screw fixation measuring up to 6 mm with loosening of the proximal fixation.   Cannulated screw fixation across the posterior facet of the subtalar joint from the plantar calcaneal tuberosity is intact. No surrounding loosening demonstrated. There is component of solid osseous fusion across the central to lateral margin of the posterior facet of the subtalar joint.   Residual navicular bone demonstrates dorsal subluxation from the talonavicular and naviculocuneiform articulations. There is component of osseous fragmentation of this bone. Remainder of the midfoot osseous structures demonstrate congruent calcaneocuboid articulation. The inter cuneiform articulations demonstrate component of intertarsal osteoarthritis. The Lisfranc joint is congruent. Mild osteoarthritis demonstrated across the tarsometatarsal articulations.   Forefoot metatarsal shafts demonstrate bones to be osteopenic. No cortical or trabecular  discontinuity. 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 Remainder of the articulations are unremarkable. Digits distally demonstrate no evidence for fracture. There is skin ulceration along the tip of the 1st digit.. No loss of cortical margin or definite osteomyelitis. However, cellulitis is demonstrated. No abscess formation.           1. Postoperative fixation across the 1st ray with solid osseous fusion across the 1st tarsometatarsal articulation. There is osseous fragmentation of the navicular bone with lytic lucency about the proximal screw fixation in the talus with resultant loosening.   2. Skin ulceration plantar and distal margin of the 1st digit about the 1st distal phalanx with surrounding cellulitis. No definite abscess formation. No loss of cortical margin or definite osteomyelitis.     MACRO: None   Signed by: Kay Lundy 1/10/2024 12:27 PM Dictation workstation:   [id number]    XR foot right 3+ views    Result Date: 1/9/2024  Interpreted By:  Marcin Vincent, STUDY: XR FOOT RIGHT 3+ VIEWS; 1/9/2024 12:18 pm   INDICATION: Signs/Symptoms:increasing pain/wound. Pain worsening wound on the right great toe.   COMPARISON: 12/04/2019   ACCESSION NUMBER(S): [id number]   ORDERING CLINICIAN: LINN AMAYA   TECHNIQUE: Views: Right foot AP, Lat, Oblique   FINDINGS: Arthrodesis with a large intramedullary lonny/screw extending from the diaphysis of the 1st metatarsal through the midfoot and hindfoot to the level of the talus. Mild Doris screw lucency distally within the talus, of doubtful clinical significance. 2nd screw fixates the talus and calcaneus without Doris screw lucency or abnormality. There are chronic degenerative changes of the talus and navicular bones which are slightly progressed from the prior examination. The remainder of the examination shows mild degenerative changes of the 1st metatarsophalangeal joint   There is soft tissue swelling of the right great toe. No bony  erosion or underlying gas is seen.       Soft tissue swelling of the right great toe without bony erosion or underlying soft tissue gas.   Chronic appearing degenerative changes. Arthrodesis of the right foot as described in the body of the report.   Signed by: Marcin Vincent 1/9/2024 1:08 PM Dictation workstation:   [id number]         Assessment/Plan   Principal Problem:    Syncope, unspecified syncope type    Nuris Squires is a 58 y.o. female with PMHx of T2DM (HbA1c 13 01/2024) c/b neuropathy, HTN, CKD stage III, multiple foot infections s/p recent partial R big toe amputation in which she grew Group B strep and was on 6 week course of ceftriaxone via tunneled line (Serena catheter placed 01/15/24, CTX to be continued through 02/21/24), recent admission for PNA on 4L home O2 that presented on 02/02/24 with altered mental status and syncope. Patient noted to have hypoglycemia and was found to be significantly hypothermic on admission. Of note, patient has had recurrent hypoglycemia since admission and admission blood cultures (4/4) now growing coagulase negative staph, suggesting that bacteremia may be leading to recurrent hypoglycemia. Potential sources include central line infection, secondary infection of toe, or potentially urine (patient notably grew VRE in urine culture on 01/31, but this was thought to be an asymptomatic colonization and was never treated) . Per ID, Serena line can be kept in as Bcx with coag negative Staph likely contaminant and discontinue vancomycin. Should continue ceftriaxone till 2/21 for GBS infection of R foot.      Creatinine 2.07 yesterday, concerning for VENKAT iso CKD3. Prerenal VENKAT more likely. Trialed 1L bolus dose NS on over 2hr. Cr this AM at 1.89. UA positive for 1+ protein, 3+ glucose, and leukocyte esterase, WBC >50, RBC >20. Urine culture with no growth at 1 day. Will keep on ceftriaxone for now.     Updates 2/9/2024:  - Cr today AM 1.80 (down from 1.89 yesterday)  -  Encourage PO intake today   - Repeat UA unchanged, follow up urine culture  - Will keep Serena line in per ID  - C/w ceftriaxone until 2/21 per ID  - 8u lantus at bedtime starting tonight  - Encourage IS use 10x/hr -- on 1L NC     #Coagulase Negative Staph bacteremia  #s/p R great toe partial amputation  #GBS osteomyelitis  #Hypothermia, resolved  - s/p R great toe partial amputation on 01/12 with podiatry for source control of infection  - intra-op culture grew rare Group B streptococcus  - R chest tunneled line placed 01/15 for 6 weeks of IV ceftriaxone planned end 02/21/2024  - Echo during last admission 01/25 shows LVEF 70-75%, mildly increased LV septal wall thickness, otherwise unremarkable  - Culture data  - Blood cultures x2 02/03/24, MRSE 4/4 bottles  - ID consulted, appreciate recs  - Switched from Zosyn to Ceftriaxone on 2/4 per ID recs  Plan:  - Repeat blood cultures from 2/5 with no growth at 2 days  - Discontinue IV Vancomycin (2/3-), continue ceftriaxone (2/4-2/21)  - ID not recommending Serena line removal at this time     #T2DM c/b neuropathy, CKD III  #Recurrent hypoglycemia  - A1c 13.1% on 01/24/2024  - Home regimen: lantus 70 units, prandial lispro 10 units TID  - Got 50 units Lantus on evening of 2/3 with AM BG 2/4 in the 20's  - Suspect bacteremia is driving hypoglycemia  Plan:  - hold metformin  - given 5u lantus this AM, plan to give 3u lantus this PM  - increase to 8u daily starting tomorrow        #recent PNA  - discharged home on 4L O2 on 01/31/2024  Plan:  - incentive spirometer   - Wean O2 as tolerated for goal SpO2 > 92%     #HTN  - Continue home clonidine and amlodipine     #DLD  - continue atorvastatin 40 mg  - Continue home aspirin 81 mg (Can discuss stopping with patient as this appears to be for primary prevention)     #VENKAT on CKD Stage III  - baseline Cr around 1.4  - Cr up to 2.07 yesterday  Plan:  - Cr at 1.89 today  - Encourage PO intake today     Diet: Diabetic 75 carb/meal 45  carb/snack  Access: PIV, tunneled line (placed 01/15/24)  DVT prophylaxis: Lovenox BID  Code status: Full code (confirmed on admission)  NOK: Brandon Rousseau (Significant Other)  [phone]          To Rodríguez MD

## 2024-02-09 NOTE — CARE PLAN
The patient's goals for the shift include Feel better    The clinical goals for the shift include Will not have episode of dizziness or lightheadedness during shift.      Problem: Fall/Injury  Goal: Not fall by end of shift  Outcome: Progressing     Problem: Fall/Injury  Goal: Be free from injury by end of the shift  Outcome: Progressing

## 2024-02-09 NOTE — PROGRESS NOTES
Subjective   Nuris Squires is a 58 y.o. female on hospital day 6 overall doing well.  Shortness of breath is doing well and patient is getting around better with assistance.    Objective     Exam     Vitals:    02/08/24 0741 02/08/24 1604 02/08/24 2321 02/09/24 0807   BP: 166/84 128/65 150/82 169/83   Pulse: 72 69 66 67   Resp:   18 18   Temp: 36.9 °C (98.4 °F) 37.2 °C (99 °F) 37.1 °C (98.8 °F) 36.9 °C (98.4 °F)   TempSrc:       SpO2: 93% 94% 95% 95%   Weight:       Height:          Intake/Output last 3 shifts:  I/O last 3 completed shifts:  In: 600 (5.9 mL/kg) [P.O.:600]  Out: 1 (0 mL/kg) [Urine:1 (0 mL/kg/hr)]  Weight: 102.1 kg     Physical Exam  Vitals reviewed.   Constitutional:       Comments: Very pleasant   HENT:      Head: Normocephalic and atraumatic.      Mouth/Throat:      Mouth: Mucous membranes are moist.   Neck:      Vascular: No carotid bruit.   Cardiovascular:      Rate and Rhythm: Normal rate and regular rhythm.      Pulses: Normal pulses.      Heart sounds: No murmur heard.     No friction rub. No gallop.      Comments: Chest wall tunneled PICC unremarkable  Pulmonary:      Effort: Pulmonary effort is normal.      Breath sounds: Normal breath sounds. No wheezing, rhonchi or rales.   Abdominal:      General: Bowel sounds are normal. There is no distension.      Palpations: Abdomen is soft.      Tenderness: There is abdominal tenderness (Slight epigastric). There is no guarding or rebound.   Musculoskeletal:      Cervical back: Neck supple.      Right lower leg: No edema.      Left lower leg: No edema.      Comments: Right great toe amputation bandaged   Skin:     General: Skin is warm and dry.   Neurological:      General: No focal deficit present.      Mental Status: She is alert and oriented to person, place, and time.   Psychiatric:         Mood and Affect: Mood normal.         Behavior: Behavior normal.            Medications   amLODIPine, 10 mg, oral, Daily  aspirin, 81 mg, oral,  "Daily  atorvastatin, 40 mg, oral, Daily  cefTRIAXone, 2 g, intravenous, q24h  cholecalciferol, 2,000 Units, oral, Daily  cloNIDine, 0.1 mg, oral, BID  enoxaparin, 40 mg, subcutaneous, q12h NEHEMIAS  insulin glargine, 8 Units, subcutaneous, q24h  insulin lispro, 0-5 Units, subcutaneous, TID with meals  magnesium oxide, 400 mg, oral, Daily  nystatin, 1 Application, Topical, BID  oxygen, , inhalation, q24h  pantoprazole, 40 mg, oral, Daily before breakfast  sennosides-docusate sodium, 2 tablet, oral, BID       PRN medications: acetaminophen, albuterol, dextrose 10 % in water (D10W), dextrose, glucagon, meclizine, oxygen       Labs     All new labs reviewed:  some of the basic labs as follows -     Results from last 7 days   Lab Units 02/09/24  0850 02/08/24  0723 02/07/24  0422   WBC AUTO x10*3/uL 4.6 5.7 4.7   HEMOGLOBIN g/dL 8.7* 9.0* 8.5*   HEMATOCRIT % 27.5* 28.9* 25.3*   PLATELETS AUTO x10*3/uL 386 418 393   NEUTROS PCT AUTO % 40.2 54.5 46.5   LYMPHS PCT AUTO % 43.0 32.6 37.8   MONOS PCT AUTO % 11.6 9.3 10.8   EOS PCT AUTO % 3.9 2.5 3.6            Results from last 72 hours   Lab Units 02/09/24  0850 02/08/24  0723 02/07/24  0422   SODIUM mmol/L 144 145 142   POTASSIUM mmol/L 4.0 4.3 4.1   CHLORIDE mmol/L 109* 109* 106   CO2 mmol/L 27 26 28   BUN mg/dL 18 20 20   CREATININE mg/dL 1.80* 1.89* 2.07*       Results from last 72 hours   Lab Units 02/09/24  0850 02/08/24  0723 02/07/24  0422   ALBUMIN g/dL 2.9* 2.9* 2.9*       Results from last 72 hours   Lab Units 02/09/24  0850 02/08/24  0723 02/07/24  0422   GLUCOSE mg/dL 108* 159* 258*       Results from last 72 hours   Lab Units 02/09/24  0108 02/07/24  1127   LEUKOCYTES U  500 Rashi/µL* 500 Rashi/µL*   NITRITE U  NEGATIVE NEGATIVE   WBC UR /HPF >50* >50*   WBC CLUMP UR /HPF  --  OCCASIONAL   RBC UR HPF /HPF >20* >20*   BLOOD UR  0.03 (TRACE)* NEGATIVE       No results found for: \"TR1\"  Lab Results   Component Value Date    URINECULTURE No significant growth 02/07/2024    " BLOODCULT No growth at 4 days -  FINAL REPORT 02/05/2024    BLOODCULT No growth at 4 days -  FINAL REPORT 02/05/2024    BLOODCULT Staphylococcus epidermidis (AA) 02/03/2024    BLOODCULT Staphylococcus epidermidis (AA) 02/03/2024            Imaging   ECG 12 Lead  Normal sinus rhythm  Possible Anterior infarct (cited on or before 15-ISAK-2024)  Abnormal ECG  When compared with ECG of 24-JAN-2024 20:35,  Premature supraventricular complexes are no longer Present  QRS axis Shifted left  Confirmed by Abhinav Wallace ([id number]) on 2/3/2024 11:09:00 PM  CT head wo IV contrast, CT cervical spine wo IV contrast  Narrative: Interpreted By:  Cheyanne Herrera,  and Sharee Pedroza   STUDY:  CT HEAD WO IV CONTRAST; CT CERVICAL SPINE WO IV CONTRAST;  2/3/2024  12:24 am      INDICATION:  Signs/Symptoms:Unwitnessed fall, altered mental status      COMPARISON:  CT head: 06/27/2023, CT C-spine 01/25/2023      ACCESSION NUMBER(S):  [id number]; [id number]      ORDERING CLINICIAN:  CHEYANNE REYES      TECHNIQUE:  Axial noncontrast CT images of head with coronal and sagittal  reconstructed images. Axial noncontrast CT images of the cervical  spine with coronal and sagittal reconstructed images.      FINDINGS:  CT HEAD:      BRAIN PARENCHYMA: Unchanged calcification of the dentate nuclei and  globus pallidi. No acute intraparenchymal hemorrhage or parenchymal  evidence of acute large territory ischemic infarct. No mass-effect.  Gray-white matter distinction is preserved.      VENTRICLES and EXTRA-AXIAL SPACES:  No acute extra-axial or  intraventricular hemorrhage. No effacement of cerebral sulci.  Ventricles and sulci are age-concordant.      PARANASAL SINUSES/MASTOIDS:  No hemorrhage or air-fluid levels within  the visualized paranasal sinuses. The mastoids are well aerated.      CALVARIUM/ORBITS:  No skull fracture.  The orbits and globes are  intact to the extent visualized.      EXTRACRANIAL SOFT TISSUES: No discernible abnormality.           CT CERVICAL SPINE:      PREVERTEBRAL SOFT TISSUES: Within normal limits.      CRANIOCERVICAL JUNCTION: Intact.      ALIGNMENT:  No traumatic malalignment or traumatic facet widening.      VERTEBRAE: Vertebral body heights are maintained. No acute fracture.  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  Thick ossification of the anterior longitudinal ligament and bridging  osteophytes at C3-C4 through C6-C7 compatible with DISH.      SPINAL CANAL/INTERVERTEBRAL DISCS: No high-grade spinal canal  stenosis. No significant disc height loss.      NEURAL FORAMINA: Multilevel facet and uncovertebral joint arthropathy  result in mild right C4-C5 foraminal stenosis but no high-grade  foraminal stenosis.      OTHER: None.      Impression: CT HEAD:  1. No acute intracranial abnormality or calvarial fracture.      CT CERVICAL SPINE:  1. No acute fracture or traumatic malalignment of the cervical spine.  2. Redemonstration of DISH without significant canal or foraminal  stenosis.  3. Redemonstration of chronic ununited fracture of the C6 spinous  process.      I personally reviewed the images/study and I agree with the findings  as stated by Resident Yovany Tolliver MD. This study was interpreted  at [place], [place].      MACRO:  None.      Signed by: Mike Herrera 2/3/2024 1:56 AM  Dictation workstation:   [id number]  XR chest 1 view  Narrative: STUDY:  Chest Radiograph;  2/2/2024 11:56 PM  INDICATION:  Fall and altered mental status.  COMPARISON:  1/30/2024 XR Chest two view  ACCESSION NUMBER(S):  [id number]  ORDERING CLINICIAN:  Mike Nino  TECHNIQUE:  Frontal chest was obtained at 23:56 hours.  FINDINGS:  CARDIOMEDIASTINAL SILHOUETTE:  Cardiomediastinal silhouette is enlarged in size and configuration.   Right IJ central venous catheter is seen with the tip in the upper  SVC.     LUNGS:  Increased interstitial markings are seen bilaterally with  airspace  disease at the left lung base and along left cardiac border.  Left  pleural effusion may be present.     ABDOMEN:  No remarkable upper abdominal findings.     BONES:  No acute osseous changes.  Impression: Cardiomegaly with increased interstitial markings bilaterally and left  basilar airspace disease with question of a left effusion.  CHF is a  consideration however left lower lobe pneumonia is suspected in the  appropriate clinical setting.  Signed by Jhon Stanton     No results found for this or any previous visit from the past 1095 days.     Encounter Date: 01/24/24   ECG 12 Lead   Result Value    Ventricular Rate 77    Atrial Rate 77    TX Interval 134    QRS Duration 78    QT Interval 396    QTC Calculation(Bazett) 448    P Axis 39    R Axis 26    T Axis 19    QRS Count 12    Q Onset 222    P Onset 155    P Offset 215    T Offset 420    QTC Fredericia 430    Narrative    Normal sinus rhythm  Possible Anterior infarct (cited on or before 15-ISKA-2024)  Abnormal ECG  When compared with ECG of 24-JAN-2024 20:35,  Premature supraventricular complexes are no longer Present  QRS axis Shifted left  Confirmed by Abhinav Wallace ([id number]) on 2/3/2024 11:09:00 PM        Assessment and Plan     Syncope: Unclear if related to infection versus hypoglycemia versus recent over diuresis.  Orthostatics were negative although multiple days into admission.  Urinalysis now with inflammatory changes although culture from 2 days ago is negative despite contamination.  Clean-catch from yesterday cultures pending.  Repeat blood cultures are all negative and final after the initial 4 out of 4 grew Staphylococcus epidermidis which was felt to be contamination by the infectious disease service  -Continue antibiotics/ceftriaxone per infectious disease service.  No indication to remove PICC line per infectious disease input.  Infectious disease input greatly appreciated  -Follow-up repeat urine culture.    -Plan outpatient podiatry  and infectious disease follow-up for her group B strep osteomyelitis toe amputation for which she will continue ceftriaxone via central access    Cardiovascular:  -Continue clonidine and amlodipine.  May need to increase clonidine to 3 times daily for better coverage  -Continue statin and aspirin  -Holding home lisinopril    Diabetes mellitus: Blood glucose now running elevated once again.  Last hemoglobin A1c is 13.1.  Required 6 units of sliding scale insulin yesterday along with the 5 units of Lantus in the morning.  Hypoglycemic episodes appear likely related to too much Lantus in the setting of renal dysfunction  -Continue Lantus.  Will change to 8 units at nighttime for now.  Will titrate per sliding scale insulin needs  -Continue mild sliding scale  -Hold metformin at this time    Pulmonary:  -Incentive spirometer 10 times an hour while awake  -Wean O2.  Patient down to 1 L after recent discharge on 4 L in the setting of pneumonia  -Humidify O2    acute renal failure: Creatinine has come back down to 1.8.  Overall baseline appears possibly closer to 1.5.  Possibly related to recent diuresis versus antibiotics versus?  FeNa is 2.4  -Monitor renal function panel  -Monitor PVR  -Encourage oral hydration.  -Vancomycin has been stopped    Anemia: Hemoglobin roughly stable at 8.7  -Monitor CBC  -Monitor for gross blood losses including in stools    GI: On further description does not appear patient has somewhat early satiety as it is feeling food may get stuck as it enters the stomach pointing towards the GE junction.  Patient has been able to eat well  -Continue PPI.  If sensation does not improve she may need esophagram +/- GI endoscopic evaluation/follow-up  -Bowel care    DVT prophylaxis    Physical therapy/Occupational Therapy when appropriate    Austin Nance MD     Of note the above was done with Dragon dictation system.  Note was proofread to minimize errors.

## 2024-02-09 NOTE — PROGRESS NOTES
Nuris Squires is a 58 y.o. female on day 6 of admission presenting with Syncope, unspecified syncope type.    Transitional Care Coordination Progress Note:  Patient discussed during interdisciplinary rounds.   Team members present: MD and TCC  Plan per Medical/Surgical team: Waiting on labs  Payor: [facility]  Discharge disposition: Home with  home care for PT/OT and Infusion Nurse for antibiotics.  Potential Barriers: None  ADOD: 02/10/24    PROSPER DUMONT

## 2024-02-09 NOTE — PROGRESS NOTES
"Nuris Squires is a 58 y.o. female on day 6 of admission presenting with Syncope, unspecified syncope type.    Subjective   NAEON. Endorsing mild HA this morning, consistent with throbbing frontal HA she had in the morning yesterday, typically improves with tylenol. On 1L NC today, but states she mostly feels SOB on exertion. Denies fevers, chills, night sweats. Denies dysuria, trouble with urination or increased urinary frequency. Was on initially on 1L fluid restriction likely due to initial CXR at presentation showing left pleural effusion (c/f PNA vs CHF, though given recent bout of PNA, former was more likely).       Objective     Physical Exam  Physical Exam  Cardiovascular:      Rate and Rhythm: Normal rate and regular rhythm.   Pulmonary:      Breath sounds: Normal breath sounds.   Abdominal:      General: Abdomen is flat. Bowel sounds are normal.      Palpations: Abdomen is soft.      Tenderness: There is abdominal tenderness in the epigastric area.       Last Recorded Vitals  Blood pressure 150/82, pulse 66, temperature 37.1 °C (98.8 °F), resp. rate 18, height 1.575 m (5' 2\"), weight 102 kg (225 lb), SpO2 95 %.  Intake/Output last 3 Shifts:  I/O last 3 completed shifts:  In: 956 (9.4 mL/kg) [P.O.:956]  Out: 1190 (11.7 mL/kg) [Urine:1190 (0.3 mL/kg/hr)]  Weight: 102.1 kg     Relevant Results    Scheduled medications  amLODIPine, 10 mg, oral, Daily  aspirin, 81 mg, oral, Daily  atorvastatin, 40 mg, oral, Daily  cefTRIAXone, 2 g, intravenous, q24h  cholecalciferol, 2,000 Units, oral, Daily  cloNIDine, 0.1 mg, oral, BID  enoxaparin, 40 mg, subcutaneous, q12h NEHEMIAS  insulin glargine, 8 Units, subcutaneous, q24h  insulin lispro, 0-5 Units, subcutaneous, TID with meals  magnesium oxide, 400 mg, oral, Daily  nystatin, 1 Application, Topical, BID  oxygen, , inhalation, q24h  pantoprazole, 40 mg, oral, Daily before breakfast  sennosides-docusate sodium, 2 tablet, oral, BID      Continuous medications     PRN " medications  PRN medications: acetaminophen, albuterol, dextrose 10 % in water (D10W), dextrose, glucagon, meclizine, oxygen    Results for orders placed or performed during the hospital encounter of 02/02/24 (from the past 24 hour(s))   CBC and Auto Differential   Result Value Ref Range    WBC 5.7 4.4 - 11.3 x10*3/uL    nRBC 0.0 0.0 - 0.0 /100 WBCs    RBC 2.95 (L) 4.00 - 5.20 x10*6/uL    Hemoglobin 9.0 (L) 12.0 - 16.0 g/dL    Hematocrit 28.9 (L) 36.0 - 46.0 %    MCV 98 80 - 100 fL    MCH 30.5 26.0 - 34.0 pg    MCHC 31.1 (L) 32.0 - 36.0 g/dL    RDW 13.3 11.5 - 14.5 %    Platelets 418 150 - 450 x10*3/uL    Neutrophils % 54.5 40.0 - 80.0 %    Immature Granulocytes %, Automated 0.4 0.0 - 0.9 %    Lymphocytes % 32.6 13.0 - 44.0 %    Monocytes % 9.3 2.0 - 10.0 %    Eosinophils % 2.5 0.0 - 6.0 %    Basophils % 0.7 0.0 - 2.0 %    Neutrophils Absolute 3.12 1.20 - 7.70 x10*3/uL    Immature Granulocytes Absolute, Automated 0.02 0.00 - 0.70 x10*3/uL    Lymphocytes Absolute 1.86 1.20 - 4.80 x10*3/uL    Monocytes Absolute 0.53 0.10 - 1.00 x10*3/uL    Eosinophils Absolute 0.14 0.00 - 0.70 x10*3/uL    Basophils Absolute 0.04 0.00 - 0.10 x10*3/uL   Renal function panel   Result Value Ref Range    Glucose 159 (H) 74 - 99 mg/dL    Sodium 145 136 - 145 mmol/L    Potassium 4.3 3.5 - 5.3 mmol/L    Chloride 109 (H) 98 - 107 mmol/L    Bicarbonate 26 21 - 32 mmol/L    Anion Gap 14 10 - 20 mmol/L    Urea Nitrogen 20 6 - 23 mg/dL    Creatinine 1.89 (H) 0.50 - 1.05 mg/dL    eGFR 30 (L) >60 mL/min/1.73m*2    Calcium 9.0 8.6 - 10.6 mg/dL    Phosphorus 4.1 2.5 - 4.9 mg/dL    Albumin 2.9 (L) 3.4 - 5.0 g/dL   Magnesium   Result Value Ref Range    Magnesium 2.21 1.60 - 2.40 mg/dL   POCT GLUCOSE   Result Value Ref Range    POCT Glucose 129 (H) 74 - 99 mg/dL   POCT GLUCOSE   Result Value Ref Range    POCT Glucose 173 (H) 74 - 99 mg/dL   POCT GLUCOSE   Result Value Ref Range    POCT Glucose 175 (H) 74 - 99 mg/dL   POCT GLUCOSE   Result Value Ref Range     POCT Glucose 218 (H) 74 - 99 mg/dL   Urinalysis with Reflex Culture and Microscopic   Result Value Ref Range    Color, Urine Light-Yellow Light-Yellow, Yellow, Dark-Yellow    Appearance, Urine Turbid (N) Clear    Specific Gravity, Urine 1.011 1.005 - 1.035    pH, Urine 6.5 5.0, 5.5, 6.0, 6.5, 7.0, 7.5, 8.0    Protein, Urine 70 (1+) (A) NEGATIVE, 10 (TRACE), 20 (TRACE) mg/dL    Glucose, Urine 300 (3+) (A) Normal mg/dL    Blood, Urine 0.03 (TRACE) (A) NEGATIVE    Ketones, Urine NEGATIVE NEGATIVE mg/dL    Bilirubin, Urine NEGATIVE NEGATIVE    Urobilinogen, Urine Normal Normal mg/dL    Nitrite, Urine NEGATIVE NEGATIVE    Leukocyte Esterase, Urine 500 Rashi/µL (A) NEGATIVE   Microscopic Only, Urine   Result Value Ref Range    WBC, Urine >50 (A) 1-5, NONE /HPF    RBC, Urine >20 (A) NONE, 1-2, 3-5 /HPF    Squamous Epithelial Cells, Urine 1-9 (SPARSE) Reference range not established. /HPF    Mucus, Urine FEW Reference range not established. /LPF     ECG 12 Lead    Result Date: 2/3/2024  Normal sinus rhythm Possible Anterior infarct (cited on or before 15-ISAK-2024) Abnormal ECG When compared with ECG of 24-JAN-2024 20:35, Premature supraventricular complexes are no longer Present QRS axis Shifted left Confirmed by Abhinav Wallace ([id number]) on 2/3/2024 11:09:00 PM    CT head wo IV contrast    Result Date: 2/3/2024  Interpreted By:  Cheyanne Herrera and Dervishi Mario STUDY: CT HEAD WO IV CONTRAST; CT CERVICAL SPINE WO IV CONTRAST;  2/3/2024 12:24 am   INDICATION: Signs/Symptoms:Unwitnessed fall, altered mental status   COMPARISON: CT head: 06/27/2023, CT C-spine 01/25/2023   ACCESSION NUMBER(S): [id number]; [id number]   ORDERING CLINICIAN: CHEYANNE REYES   TECHNIQUE: Axial noncontrast CT images of head with coronal and sagittal reconstructed images. Axial noncontrast CT images of the cervical spine with coronal and sagittal reconstructed images.   FINDINGS: CT HEAD:   BRAIN PARENCHYMA: Unchanged calcification of the dentate  nuclei and globus pallidi. No acute intraparenchymal hemorrhage or parenchymal evidence of acute large territory ischemic infarct. No mass-effect. Gray-white matter distinction is preserved.   VENTRICLES and EXTRA-AXIAL SPACES:  No acute extra-axial or intraventricular hemorrhage. No effacement of cerebral sulci. Ventricles and sulci are age-concordant.   PARANASAL SINUSES/MASTOIDS:  No hemorrhage or air-fluid levels within the visualized paranasal sinuses. The mastoids are well aerated.   CALVARIUM/ORBITS:  No skull fracture.  The orbits and globes are intact to the extent visualized.   EXTRACRANIAL SOFT TISSUES: No discernible abnormality.     CT CERVICAL SPINE:   PREVERTEBRAL SOFT TISSUES: Within normal limits.   CRANIOCERVICAL JUNCTION: Intact.   ALIGNMENT:  No traumatic malalignment or traumatic facet widening.   VERTEBRAE: Vertebral body heights are maintained. No acute fracture. 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 Thick ossification of the anterior longitudinal ligament and bridging osteophytes at C3-C4 through C6-C7 compatible with DISH.   SPINAL CANAL/INTERVERTEBRAL DISCS: No high-grade spinal canal stenosis. No significant disc height loss.   NEURAL FORAMINA: Multilevel facet and uncovertebral joint arthropathy result in mild right C4-C5 foraminal stenosis but no high-grade foraminal stenosis.   OTHER: None.       CT HEAD: 1. No acute intracranial abnormality or calvarial fracture.   CT CERVICAL SPINE: 1. No acute fracture or traumatic malalignment of the cervical spine. 2. Redemonstration of DISH without significant canal or foraminal stenosis. 3. Redemonstration of chronic ununited fracture of the C6 spinous process.   I personally reviewed the images/study and I agree with the findings as stated by Resident Yovany Tolliver MD. This study was interpreted at [place], [place].   MACRO: None.   Signed by: Mike Herrera 2/3/2024  1:56 AM Dictation workstation:   [id number]    CT cervical spine wo IV contrast    Result Date: 2/3/2024  Interpreted By:  Cheyanne Herrera and Dervishi Mario STUDY: CT HEAD WO IV CONTRAST; CT CERVICAL SPINE WO IV CONTRAST;  2/3/2024 12:24 am   INDICATION: Signs/Symptoms:Unwitnessed fall, altered mental status   COMPARISON: CT head: 06/27/2023, CT C-spine 01/25/2023   ACCESSION NUMBER(S): [id number]; [id number]   ORDERING CLINICIAN: CHEYANNE REYES   TECHNIQUE: Axial noncontrast CT images of head with coronal and sagittal reconstructed images. Axial noncontrast CT images of the cervical spine with coronal and sagittal reconstructed images.   FINDINGS: CT HEAD:   BRAIN PARENCHYMA: Unchanged calcification of the dentate nuclei and globus pallidi. No acute intraparenchymal hemorrhage or parenchymal evidence of acute large territory ischemic infarct. No mass-effect. Gray-white matter distinction is preserved.   VENTRICLES and EXTRA-AXIAL SPACES:  No acute extra-axial or intraventricular hemorrhage. No effacement of cerebral sulci. Ventricles and sulci are age-concordant.   PARANASAL SINUSES/MASTOIDS:  No hemorrhage or air-fluid levels within the visualized paranasal sinuses. The mastoids are well aerated.   CALVARIUM/ORBITS:  No skull fracture.  The orbits and globes are intact to the extent visualized.   EXTRACRANIAL SOFT TISSUES: No discernible abnormality.     CT CERVICAL SPINE:   PREVERTEBRAL SOFT TISSUES: Within normal limits.   CRANIOCERVICAL JUNCTION: Intact.   ALIGNMENT:  No traumatic malalignment or traumatic facet widening.   VERTEBRAE: Vertebral body heights are maintained. No acute fracture. 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 Thick ossification of the anterior longitudinal ligament and bridging osteophytes at C3-C4 through C6-C7 compatible with DISH.   SPINAL CANAL/INTERVERTEBRAL DISCS: No high-grade spinal canal stenosis. No significant disc height loss.   NEURAL FORAMINA:  Multilevel facet and uncovertebral joint arthropathy result in mild right C4-C5 foraminal stenosis but no high-grade foraminal stenosis.   OTHER: None.       CT HEAD: 1. No acute intracranial abnormality or calvarial fracture.   CT CERVICAL SPINE: 1. No acute fracture or traumatic malalignment of the cervical spine. 2. Redemonstration of DISH without significant canal or foraminal stenosis. 3. Redemonstration of chronic ununited fracture of the C6 spinous process.   I personally reviewed the images/study and I agree with the findings as stated by Resident Yovany Tolliver MD. This study was interpreted at [place], [place].   MACRO: None.   Signed by: Mike Herrera 2/3/2024 1:56 AM Dictation workstation:   [id number]    XR chest 1 view    Result Date: 2/3/2024  STUDY: Chest Radiograph;  2/2/2024 11:56 PM INDICATION: Fall and altered mental status. COMPARISON: 1/30/2024 XR Chest two view ACCESSION NUMBER(S): [id number] ORDERING CLINICIAN: Mike Nino TECHNIQUE:  Frontal chest was obtained at 23:56 hours. FINDINGS: CARDIOMEDIASTINAL SILHOUETTE: Cardiomediastinal silhouette is enlarged in size and configuration. Right IJ central venous catheter is seen with the tip in the upper SVC.  LUNGS: Increased interstitial markings are seen bilaterally with airspace disease at the left lung base and along left cardiac border.  Left pleural effusion may be present.  ABDOMEN: No remarkable upper abdominal findings.  BONES: No acute osseous changes.    Cardiomegaly with increased interstitial markings bilaterally and left basilar airspace disease with question of a left effusion.  CHF is a consideration however left lower lobe pneumonia is suspected in the appropriate clinical setting. Signed by Jhon Stanton    XR chest 2 views    Result Date: 1/30/2024  Interpreted By:  Luis Taveras and Ritchie Brandon STUDY: XR CHEST 2 VIEWS;  1/30/2024 3:24 pm   INDICATION:  Signs/Symptoms:evaluate for effusion.   COMPARISON: Chest x-ray 2024   ACCESSION NUMBER(S): [id number]   ORDERING CLINICIAN: MARTHA REIS   FINDINGS: PA and lateral radiographs of the chest were provided.  Additional PA dual energy images were also provided.   Right internal jugular central venous catheter tip projects over the expected location of the right brachiocephalic vein.   CARDIOMEDIASTINAL SILHOUETTE: Cardiomediastinal silhouette is enlarged but stable in size and configuration.   LUNGS: Expiratory radiograph/low lung volumes are again contributing to bronchovascular crowding. Mildly improved hazy consolidation of the left upper lung zone consistent with known airspace disease seen on prior CT of the chest from 2024. Compared to prior chest x-ray from 2024, there is improving bilateral pleural effusions and bibasilar atelectasis.   ABDOMEN: No remarkable upper abdominal findings.   BONES: No acute osseous changes.       1. Mildly improving bilateral pleural effusions, left-greater-than-right with adjacent bibasilar atelectasis. Continued follow-up to resolution recommended. 2. Mildly improved hazy consolidation involving the left upper lung zone consistent with prior CT of the chest from 2024 and likely suggestive of infectious process. 3. Similar cardiomegaly   I personally reviewed the images/study and I agree with the findings as stated by resident Miguel Angel Ortiz. This study was interpreted at [place], [place].   MACRO: None   Signed by: Luis Taveras 2024 8:09 PM Dictation workstation:   [id number]    Thoracentesis    Result Date: 2024  Chest Ultrasound Note DATE OF SERVICE: 2024   PROCEDURE LOCATION: [facility] PATIENT IDENTIFIERS: Nuris Squires : 1965 MRN: [id number]   REFERRING PROVIDER: PRE-PROCEDURE DIAGNOSIS:  1. Bilateral pleural effusions  POST-PROCEDURE DIAGNOSES:  1. Bilateral  pleural effusions   INDICATION: 1. Bilateral pleural effusions  PROCEDURE(S) PERFORMED:   Thoracic ultrasound - Bilateral STUDY PERFORMED BY: Sidni Green MD, Silvio Early MD IMAGES SAVED: Scanned to Media, uploaded to EMR LATERALITY: Thoracic ultrasound -  Bilateral  POSITIONING: _x_ Seated __ Lateral decubitus __ Recumbent PRESENCE OF FLUID: Yes  SLIDING: Present QUANTIFICATION OF FLUID: Trace on the Right, Small on the Let FLUID CHARACTERISTICS: Anechoic PLEURAL FLUID COMPLEXITY: None DIAPHRAGMATIC EXCURSION - RIGHT: Good DIAPHRAGMATIC EXCURSION - LEFT: Good  COMMENTS: Given concern for potential pleural effusion, the patient underwent bilateral chest ultrasound. Patient was examined by chest ultrasound in the seated position, using a phased array probe. Gravity dependant areas along the posterior hemithorax starting from the para spine to the mid axilla were analyzed looking at the sub-scapular space, posterior axilla and mid axilla.  FINDINGS: 1. Trace Right sided pleural Effusion 2. Small Left sided pleural Effusion 3. Given small amounts of fluid in bilateral pleural spaces and improved symptoms, thoracentesis was not performed. Sindi Green MD 01/29/24 ============= I was present for the entirety of the procedure(s). Silvio Early MD     Vascular US Lower Extremity Venous Duplex Bilateral    Result Date: 1/29/2024            [place] [address]   Tel [phone] and Fax [phone]  Vascular Lab Report VASC US LOWER EXTREMITY VENOUS DUPLEX BILATERAL  Patient Name:     ALBERT RIDLEY        [place]           [id number] Silvio Zimmerman                                       Physician:        MD Study Date:       1/29/2024           Ordering          [id number] EFREN BLACKMON                                       Physician: MRN/PID:          [id number]            Technologist:     Jeannie Campbell T Accession#:       [id number]        Technologist 2:  Date of           1965 / 58      Encounter#:       [id number] Birth/Age:        years Gender:           F Admission Status: Inpatient           Location          [facility]                                       Performed:  Diagnosis/ICD: Other specified soft tissue disorders-M79.89 CPT Codes:     88323 Peripheral venous duplex scan for DVT complete  CONCLUSIONS: No DVT identified  Imaging & Doppler Findings:  Right                 Compressible Thrombus        Flow Distal External Iliac                None       Pulsatile CFV                       Yes        None   Spontaneous/Phasic PFV                       Yes        None FV Proximal               Yes        None   Spontaneous/Phasic FV Mid                    Yes        None FV Distal                 Yes        None Popliteal                 Yes        None   Spontaneous/Phasic Peroneal                  Yes        None PTV                       Yes        None  Left                  Compress Thrombus        Flow Distal External Iliac            None       Pulsatile CFV                     Yes      None   Spontaneous/Phasic PFV                     Yes      None FV Proximal             Yes      None   Spontaneous/Phasic FV Mid                  Yes      None FV Distal               Yes      None Popliteal               Yes      None   Spontaneous/Phasic Peroneal                Yes      None PTV                     Yes      None  [id number] Silvio Zimmerman MD Electronically signed by [id number] Silvio Zimmerman MD on 1/29/2024 at 4:33:49 PM  ** Final **     XR chest 1 view    Result Date: 1/26/2024  Interpreted By:  Sherita Ansari and Liller Gregory STUDY: XR CHEST 1 VIEW;  1/26/2024 11:39 am   INDICATION: Signs/Symptoms:o2 req.   COMPARISON: Chest radiograph 01/24/2024 and CT chest 01/25/2024.   ACCESSION NUMBER(S): [id number]   ORDERING CLINICIAN: ANA LILIA GEORGE   FINDINGS: AP radiograph of the chest was provided.   Right internal jugular central venous  catheter tip projects over the expected location of the right brachiocephalic vein.   CARDIOMEDIASTINAL SILHOUETTE: Cardiomediastinal silhouette is enlarged.   LUNGS: Low lung volumes contributing to bronchovascular crowding. Hazy opacities in the left upper lung zone compatible with known pneumonic infiltrates as noted on the previous CT chest dated 01/25/2024. Additional opacification of the bilateral lower lung zones likely correlates with known effusions and atelectasis. No pneumothorax.   ABDOMEN: No remarkable upper abdominal findings.   BONES: No osseous injury.       1. Persistent findings of left upper lobe pneumonia. 2. Redemonstration of bilateral lower lobe effusions and atelectasis. 3. Cardiomegaly.     I personally reviewed the images/study and I agree with the findings as stated above by resident physician, Dr. Rubens Lowe. The study was interpreted at [facility] in [facility].   MACRO: none.   Signed by: Sherita Ansari 1/26/2024 3:03 PM Dictation workstation:   [id number]    Transthoracic Echo (TTE) Complete    Result Date: 1/25/2024   [facility], [address] [place] [address]                Tel [phone] and Fax [phone] TRANSTHORACIC ECHOCARDIOGRAM REPORT  Patient Name:      ALBERT Mullins Physician:   [id number] Payam Salazar MD Study Date:        1/25/2024           Ordering Provider:   [id number] EFREN BLACKMON MRN/PID:           [id number]            Fellow: Accession#:        [id number]        Nurse: Date of Birth/Age: 1965 / 58      Sonographer:         Ainsley Hong RDCS                    years Gender:            F                   Additional Staff: Height:            157.48 cm           Admit Date:          1/24/2024 Weight:            102.06 kg           Admission Status:    Inpatient - Routine BSA:               2.01 m2              Encounter#:          [id number]                                        Department Location: [place] [address] MICU Blood Pressure: 172 /105 mmHg Study Type:    TRANSTHORACIC ECHO (TTE) COMPLETE Diagnosis/ICD: Pleural Effusion-J90 Indication:    pleural effusion, HF d/t valvular disease, acute, diastolic CPT Code:      Echo Complete w Full Doppler-[id number] Patient History: Pertinent History: SOB, HTN, HLD, PAD, Sickle-Cell, DM II. Study Detail: The following Echo studies were performed: 2D, M-Mode, Doppler and               color flow. Technically challenging study due to poor acoustic               windows, prominent lung artifact, patient lying in supine position               and body habitus. Definity used as a contrast agent for               endocardial border definition. Total contrast used for this               procedure was 1 mL via IV push. Unable to obtain suprasternal               notch view.  PHYSICIAN INTERPRETATION: Left Ventricle: The left ventricular systolic function is hyperdynamic, with an estimated ejection fraction of 70-75%. There are no regional wall motion abnormalities. The left ventricular cavity size is normal. The left ventricular septal wall thickness is mildly increased. There is mild concentric left ventricular hypertrophy. Spectral Doppler shows a normal pattern of left ventricular diastolic filling. Left Atrium: The left atrium is mildly dilated. Right Ventricle: The right ventricle was not well visualized. There is normal right ventricular global systolic function. Right Atrium: The right atrium is normal in size. Aortic Valve: The aortic valve is trileaflet. There is no evidence of aortic valve regurgitation. The peak instantaneous gradient of the aortic valve is 10.0 mmHg. The mean gradient of the aortic valve is 5.0 mmHg. Mitral Valve: The mitral valve is normal in structure. There is trace to mild mitral valve regurgitation. Tricuspid Valve: The tricuspid valve is structurally normal.  There is trace to mild tricuspid regurgitation. The Doppler estimated RVSP is within normal limits at 32.4 mmHg. Pulmonic Valve: The pulmonic valve is structurally normal. There is physiologic pulmonic valve regurgitation. Pericardium: There is a trivial pericardial effusion. Pleural: There is left pleural effusion. Aorta: The aortic root is normal.  CONCLUSIONS:  1. Left ventricular systolic function is hyperdynamic with a 70-75% estimated ejection fraction.  2. RVSP within normal limits. QUANTITATIVE DATA SUMMARY: 2D MEASUREMENTS:                           Normal Ranges: Ao Root d:     2.90 cm    (2.0-3.7cm) LAs:           3.35 cm    (2.7-4.0cm) IVSd:          1.25 cm    (0.6-1.1cm) LVPWd:         1.05 cm    (0.6-1.1cm) LVIDd:         4.35 cm    (3.9-5.9cm) LVIDs:         2.85 cm LV Mass Index: 107.0 g/m2 LV % FS        34.5 % LA VOLUME:                               Normal Ranges: LA Vol A4C:        77.5 ml    (22+/-6mL/m2) LA Vol A2C:        132.4 ml LA Vol BP:         101.5 ml LA Vol Index A4C:  38.6ml/m2 LA Vol Index A2C:  65.9 ml/m2 LA Vol Index BP:   50.5 ml/m2 LA Area A4C:       23.7 cm2 LA Area A2C:       30.9 cm2 LA Major Axis A4C: 6.2 cm LA Major Axis A2C: 6.1 cm LA Volume Index:   50.5 ml/m2 AORTA MEASUREMENTS:                    Normal Ranges: Asc Ao, d: 3.20 cm (2.1-3.4cm) LV DIASTOLIC FUNCTION:                            Normal Ranges: MV Peak E:      0.88 m/s   (0.7-1.2 m/s) MV Peak A:      1.10 m/s   (0.42-0.7 m/s) E/A Ratio:      0.80       (1.0-2.2) MV e'           0.08 m/s   (>8.0) MV lateral e'   0.08 m/s MV medial e'    0.08 m/s E/e' Ratio:     10.94      (<8.0) a'              0.10 m/s MV DT:          174 msec   (150-240 msec) PulmV Sys Molina:  55.10 cm/s PulmV Robles Molina: 39.00 cm/s PulmV S/D Molina:  1.40 MITRAL VALVE:                 Normal Ranges: MV DT: 174 msec (150-240msec) AORTIC VALVE:                                    Normal Ranges: AoV Vmax:                1.58 m/s  (<=1.7m/s) AoV Peak  PG:             10.0 mmHg (<20mmHg) AoV Mean P.0 mmHg  (1.7-11.5mmHg) LVOT Max Molina:            1.31 m/s  (<=1.1m/s) AoV VTI:                 30.40 cm  (18-25cm) LVOT VTI:                25.90 cm LVOT Diameter:           1.90 cm   (1.8-2.4cm) AoV Area, VTI:           2.42 cm2  (2.5-5.5cm2) AoV Area,Vmax:           2.35 cm2  (2.5-4.5cm2) AoV Dimensionless Index: 0.85  RIGHT VENTRICLE: TAPSE: 19.9 mm RV s'  0.10 m/s TRICUSPID VALVE/RVSP:                             Normal Ranges: Peak TR Velocity: 2.71 m/s RV Syst Pressure: 32.4 mmHg (< 30mmHg) IVC Diam:         1.80 cm PULMONIC VALVE:                         Normal Ranges: PV Accel Time: 107 msec (>120ms) PV Max Molina:    1.0 m/s  (0.6-0.9m/s) PV Max P.2 mmHg Pulmonary Veins: PulmV Robles Molina: 39.00 cm/s PulmV S/D Molina:  1.40 PulmV Sys Molina:  55.10 cm/s  [id number] Payam Salazar MD Electronically signed on 2024 at 12:51:30 PM  ** Final **     CT angio chest for pulmonary embolism    Result Date: 2024  Interpreted By:  Sean Vaughn and Dervishi Mario STUDY: CT ANGIO CHEST FOR PULMONARY EMBOLISM;  2024 4:16 am   INDICATION: Signs/Symptoms:hypoxia, sob.   COMPARISON: Ultrasound thyroid: 2023   ACCESSION NUMBER(S): [id number]   ORDERING CLINICIAN: MAXIME RICHARDS   TECHNIQUE: Helical data acquisition of the chest was obtained after intravenous administration of 88 mL of Omnipaque 350, as per PE protocol. Images were reformatted in coronal and sagittal planes. Axial and coronal maximum intensity projection (MIP) images were created and reviewed.   FINDINGS: POTENTIAL LIMITATIONS OF THE STUDY: The assessment is limited by respiratory motion and suboptimal contrast opacification of pulmonary arteries.   HEART AND VESSELS: No discrete filling defects within the main pulmonary artery or its branches to  proximal segmental level. Please note that, assessment of distal segmental and subsegmental branches is limited and small peripheral  emboli are not entirely excluded. Main pulmonary artery and its branches are normal in caliber.   The thoracic aorta normal in course and caliber.No significant atherosclerotic changes.No acute aortic pathology. No coronary artery calcifications are seen. Please note, the study is not optimized for evaluation of coronary arteries.   The mild cardiomegaly..There are no findings to suggest right heart strain.   There is no pericardial effusion seen.   MEDIASTINUM AND WESLEY, LOWER NECK AND AXILLA: There is a left thyroid lobe nodule measuring 1.5 cm in largest diameter. No significant abnormality of the right thyroid lobe. No evidence of thoracic lymphadenopathy by CT criteria. Esophagus appears within normal limits as seen.   LUNGS AND AIRWAYS: The trachea and central airways are patent. No endobronchial lesion is seen.   There is large bilateral pleural effusions with compressive atelectasis. There is consolidative opacities and surrounding ground-glass opacities in the left upper lobe. No pneumothorax.   UPPER ABDOMEN: The visualized subdiaphragmatic structures demonstrate no remarkable findings.     CHEST WALL AND OSSEOUS STRUCTURES: Chest wall is within normal limits. No acute osseous pathology.There are no suspicious osseous lesions.Mild multilevel degenerative changes within visualized spine.       1. No evidence of acute pulmonary embolism to segmental level. Please note that, assessment of subsegmental branches is limited and small peripheral emboli are not entirely excluded. 2. Cardiomegaly and large bilateral pleural effusion and bilateral atelectatic/consolidative opacities and also groundglass opacities and septal thickening compatible with edema. There is also evidence of asymmetric left upper lobe consolidative opacity concerning for superimposed pneumonia.   3.      Stable 1.5 cm left thyroid lobe nodule previously evaluated on 1/27/2023 thyroid ultrasound examination.   I personally reviewed the  images/study and I agree with the findings as stated by Resident Yovany Tolliver MD. This study was interpreted at [facility], [place], [place].   MACRO: None   Signed by: Sean Vaughn 1/25/2024 4:34 AM Dictation workstation:   [id number]    ECG 12 lead    Result Date: 1/24/2024   Suspect arm lead reversal, interpretation assumes no reversal Sinus tachycardia with Premature supraventricular complexes Left atrial enlargement Right axis deviation Low voltage QRS Cannot rule out Anterior infarct (cited on or before 15-ISAK-2024) Abnormal ECG When compared with ECG of 15-ISAK-2024 02:33, Premature supraventricular complexes are now Present QRS axis Shifted right See ED provider note for full interpretation and clinical correlation Confirmed by Kaye Jay ([id number]) on 1/24/2024 11:57:12 PM    XR chest 1 view    Result Date: 1/24/2024  Interpreted By:  Damien Fulton  and Dayday Novak STUDY: XR CHEST 1 VIEW;  1/24/2024 8:47 pm   INDICATION: Signs/Symptoms:sob.   COMPARISON: Chest radiograph dated 06/27/2023. CT abdomen dated 08/13/2022.   ACCESSION NUMBER(S): [id number]   ORDERING CLINICIAN: KAR WISE   FINDINGS: AP radiograph of the chest was provided.   Examination is limited due to overlying material projecting over the thorax and due to underpenetration.   CARDIOMEDIASTINAL SILHOUETTE: Cardiomediastinal silhouette is normal in size and configuration. There is a right internal jugular approach central venous catheter terminating in the superior vena cava.   LUNGS: Low lung volumes with resultant bronchovascular crowding. Hazy left basilar opacity with blunting of the left costophrenic angle which may represent atelectasis or infiltrate. No evidence of sizeable pleural effusion.   ABDOMEN: No remarkable upper abdominal findings.   BONES: No acute osseous changes.       Left retrocardiac opacity which may represent atelectasis or infiltrate.   I personally reviewed the  images/study and I agree with the findings as stated by Kishore Naylor MD. This study was interpreted at [facility], [place], [place]   MACRO: None   Signed by: Damien Fulton 1/24/2024 9:52 PM Dictation workstation:   [id number]    Point of Care Ultrasound    Result Date: 1/24/2024  Rich Beckman MD     1/24/2024  8:48 PM Performed by: Rich Beckman MD Authorized by: Rich Beckman MD  Respiratory Indications: hypoxia Procedure: Thoracic Ultrasound Findings: R Lung Sliding: The RIGHT chest was evaluated and LUNG SLIDING was visualized. L Lung Sliding: The LEFT chest was evaluated and LUNG SLIDING was visualized. L Effusion: The LEFT chest was evaluated and there was a PLEURAL EFFUSION. Impression: Thorax: The focused thoracic ultrasound exam had ABNORMAL findings as specified. and LEFT pleural effusion and B-lines     Point of Care Ultrasound    Result Date: 1/24/2024  Rich Beckman MD     1/24/2024  8:48 PM Performed by: Rich Beckman MD Authorized by: Rich Beckman MD  Respiratory Indications: hypoxia Procedure: Cardiac Ultrasound Findings:  Views: parasternal long and parasternal short The pericardial space was visualized and was NEGATIVE for a significant pericardial effusion. LV: LV systolic function was NORMAL. Impression: Cardiac: The focused cardiac ultrasound exam was NORMAL.     Electrocardiogram, 12-lead PRN ACS symptoms    Result Date: 1/24/2024  Normal sinus rhythm Possible Anterior infarct (cited on or before 15-ISAK-2024) Abnormal ECG When compared with ECG of 26-JUN-2023 20:27, Previous ECG has undetermined rhythm, needs review Confirmed by Mimi Lua ([id number]) on 1/24/2024 6:18:58 AM    XR abdomen 1 view    Result Date: 1/16/2024  Interpreted By:  Marcin Vincent, STUDY: XR ABDOMEN 1 VIEW;  1/16/2024 4:53 pm   INDICATION: Signs/Symptoms:Abdominal pain, diarrhea.   COMPARISON: None.   ACCESSION NUMBER(S): [id number]   ORDERING CLINICIAN: KARISSA  TRI   FINDINGS: Nonobstructive bowel gas pattern. Limited evaluation of pneumoperitoneum on supine imaging, however no gross evidence of free air is noted.   Visualized lungs are clear.   Osseous structures demonstrate no acute bony changes.       Nonobstructive, nonspecific bowel-gas pattern. No x-ray evidence for acute abnormality.   MACRO: None   Signed by: Marcin Vincent 1/16/2024 8:55 PM Dictation workstation:   [id number]    FL insert tunneled LELE ANUPAM catheter wo port pump > 5 years    Result Date: 1/16/2024  Interpreted By:  Elie Hussein, STUDY: FL INSERT TUNNELED LELE ANUPAM CATHETER WO PORT PUMP > 5 YEARS; US GUIDED PERCUTANEOUS PLACEMENT; 1/16/2024 11:55 am; 1/16/2024 11:38 am   INDICATION: Right foot, requiring extended term intravenous antibiotics; referred for insertion of a tunneled central venous catheter.   COMPARISON: None   ACCESSION NUMBER(S): [id number]; [id number]   ORDERING CLINICIAN: SOHA DELUNA   TECHNIQUE: Ultrasound-guided vascular access Fluoroscopy guided insertion of tunneled central venous catheter without port Fluoroscopy time:2 seconds Images: 1 Dose: 0.33 mGy air kerma   FINDINGS: Informed consent obtained. Patient positioned supine and connected to physiologic monitoring.  All elements of maximal sterile barrier were utilized including cap, mask, sterile gown, sterile gloves, large sterile drape, hand scrub, 2% chlorhexidine for skin cleaning and sterile ultrasound guidance. Ultrasound of right neck demonstrated patent internal jugular vein. Under ultrasound guidance, access into the vein was established with micro puncture and permanent image archived. Small dermatotomy made few cm below clavicle. A 5 [place] single lumen cuffed hemodialysis catheter (Palindrome) was tunneled from dermatotomy to venotomy. Introducer sheath exchanged for a peel-away sheath. Distal end of the tunneled catheter advanced centrally through the peel-away sheath. Fluoroscopy confirmed tip of  catheter at lower SVC. The catheter aspirated and flushed freely, secured, and covered with dressing. Patient tolerated procedure well.       Ultrasound and fluoroscopy guided insertion of tunneled central venous catheter.     Signed by: Elie Hussein 1/16/2024 12:44 PM Dictation workstation:   [id number]    US guided percutaneous placement    Result Date: 1/16/2024  Interpreted By:  Elie Hussein, STUDY: FL INSERT TUNNELED LELE ANUPAM CATHETER WO PORT PUMP > 5 YEARS; US GUIDED PERCUTANEOUS PLACEMENT; 1/16/2024 11:55 am; 1/16/2024 11:38 am   INDICATION: Right foot, requiring extended term intravenous antibiotics; referred for insertion of a tunneled central venous catheter.   COMPARISON: None   ACCESSION NUMBER(S): [id number]; [id number]   ORDERING CLINICIAN: SOHA DELUNA   TECHNIQUE: Ultrasound-guided vascular access Fluoroscopy guided insertion of tunneled central venous catheter without port Fluoroscopy time:2 seconds Images: 1 Dose: 0.33 mGy air kerma   FINDINGS: Informed consent obtained. Patient positioned supine and connected to physiologic monitoring.  All elements of maximal sterile barrier were utilized including cap, mask, sterile gown, sterile gloves, large sterile drape, hand scrub, 2% chlorhexidine for skin cleaning and sterile ultrasound guidance. Ultrasound of right neck demonstrated patent internal jugular vein. Under ultrasound guidance, access into the vein was established with micro puncture and permanent image archived. Small dermatotomy made few cm below clavicle. A 5 [place]ian single lumen cuffed hemodialysis catheter (Palindrome) was tunneled from dermatotomy to venotomy. Introducer sheath exchanged for a peel-away sheath. Distal end of the tunneled catheter advanced centrally through the peel-away sheath. Fluoroscopy confirmed tip of catheter at lower SVC. The catheter aspirated and flushed freely, secured, and covered with dressing. Patient tolerated procedure well.       Ultrasound and  fluoroscopy guided insertion of tunneled central venous catheter.     Signed by: Elie Hussein 1/16/2024 12:44 PM Dictation workstation:   [id number]    US renal complete    Result Date: 1/11/2024  Interpreted By:  James Aaron, STUDY: US RENAL COMPLETE   INDICATION: Signs/Symptoms:VENKAT   COMPARISON: CT scan from August 2023   ACCESSION NUMBER(S): [id number]   ORDERING CLINICIAN: CARYN HERRERA   TECHNIQUE: Real-time renal ultrasound was performed.   FINDINGS: There is some limitation, especially for the left kidney, due to patient's body habitus and overlying bowel gas obscuring some detail.   The right kidney measures 11.2 cm and the left kidney 10.5 cm in maximal length. There is normal thickness and echogenicity of the renal cortex bilaterally. No renal calculi are identified. There is no hydronephrosis. No solid renal masses are seen. A minute, less than 5 mm cyst centrally in the left kidney that was reported on the previous CT scan can not be appreciated on today's ultrasound due to aforementioned limitations.   Images of the bladder were also obtained. The bladder is suboptimally evaluated due to incomplete distention. No bladder mass, stone or debris is identified. Bilateral ureteral jets were demonstrated.       Limited but grossly unremarkable renal ultrasound.   Signed by: James Aaron 1/11/2024 2:45 PM Dictation workstation:   [id number]    Vascular US PVR Without Exercise    Result Date: 1/11/2024           [place] [address]            Phone [phone]  Vascular Lab Report  VASC US PVR WITHOUT EXERCISE Patient Name:      ALBERT Mullins Physician:  [id number] Virgen Ernandez MD, RPVI Study Date:        1/11/2024            Ordering Provider:  [id number] JENNIFER CARTAGENA MRN/PID:           [id number]             Fellow:  Accession#:        [id number]         Technologist:       Judie Tatumchuyodilia RVT Date of Birth/Age: 1965 / 58 years Technologist 2: Gender:            F                    Encounter#:         [id number] Admission Status:  Inpatient            Location Performed: [facility]  Diagnosis/ICD: Peripheral vascular disease, unspecified-I73.9 CPT Codes:     12157 Peripheral artery PVR (multi segmental pressure  CONCLUSIONS:  Right Lower PVR: No evidence of arterial occlusive disease in the right lower extremity at rest. Normal digital perfusion noted. Triphasic flow is noted in the right common femoral artery, right popliteal artery, right posterior tibial artery and right dorsalis pedis artery. Left Lower PVR: No evidence of arterial occlusive disease in the left lower extremity at rest. Triphasic flow is noted in the left common femoral artery, left popliteal artery, left posterior tibial artery and left dorsalis pedis artery. Unable to obtain TBI due to half amputation of the great toe and second digit amputation. PPG tracing appears normal.  Comparison: Compared with study from 9/17/2019, no significant change.  Imaging & Doppler Findings:  RIGHT Lower PVR                Pressures Ratios Right High Thigh               210 mmHg  1.76 Right Low Thigh                159 mmHg  1.34 Right Calf                     127 mmHg  1.07 Right Posterior Tibial (Ankle) 141 mmHg  1.18 Right Dorsalis Pedis (Ankle)   129 mmHg  1.08 Right Digit (Great Toe)        141 mmHg  1.18   LEFT Lower PVR                Pressures Ratios Left High Thigh               209 mmHg  1.76 Left Low Thigh                144 mmHg  1.21 Left Calf                     134 mmHg  1.13 Left Posterior Tibial (Ankle) 124 mmHg  1.04 Left Dorsalis Pedis (Ankle)   137 mmHg  1.15                      Right     Left Brachial Pressure 114 mmHg 119 mmHg   [id number] TIFFANIE Seymour MD Electronically signed by [id number] Saleh MD on 1/11/2024 at  2:44:43 PM  ** Final **     CT foot right w IV contrast    Result Date: 1/10/2024  Interpreted By:  Kay Lundy, STUDY: CT FOOT RIGHT W IV CONTRAST; ;  1/10/2024 11:31 am   INDICATION: Signs/Symptoms:Rule out abscess.   COMPARISON: None.   ACCESSION NUMBER(S): [id number]   ORDERING CLINICIAN: RUPERTO SOLORZANO   TECHNIQUE: Serial axial CT images obtained of the right foot. Images reformatted in the coronal and sagittal projection.   All CT examinations are performed with 1 or more of the following dose reduction techniques: Automated exposure control, adjustment of mA and/or kv according to patient's size, or use of iterative reconstruction techniques.   FINDINGS: Postoperative changes status post long-stem screw fixation across the 1st ray extending from the 1st metatarsal head through the 1st tarsometatarsal and region of the navicular bone with the proximal screw terminating within the talus. There is solid osseous fusion across the 1st tarsometatarsal articulation. Osseous fragmentation is demonstrated across the navicular bone. Of note, the proximal screw fixation within the talus demonstrates surrounding lucency with lytic appearing lucency throughout the screw fixation measuring up to 6 mm with loosening of the proximal fixation.   Cannulated screw fixation across the posterior facet of the subtalar joint from the plantar calcaneal tuberosity is intact. No surrounding loosening demonstrated. There is component of solid osseous fusion across the central to lateral margin of the posterior facet of the subtalar joint.   Residual navicular bone demonstrates dorsal subluxation from the talonavicular and naviculocuneiform articulations. There is component of osseous fragmentation of this bone. Remainder of the midfoot osseous structures demonstrate congruent calcaneocuboid articulation. The inter cuneiform articulations demonstrate component of intertarsal osteoarthritis. The Lisfranc joint is congruent. Mild  osteoarthritis demonstrated across the tarsometatarsal articulations.   Forefoot metatarsal shafts demonstrate bones to be osteopenic. No cortical or trabecular discontinuity. 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 Remainder of the articulations are unremarkable. Digits distally demonstrate no evidence for fracture. There is skin ulceration along the tip of the 1st digit.. No loss of cortical margin or definite osteomyelitis. However, cellulitis is demonstrated. No abscess formation.           1. Postoperative fixation across the 1st ray with solid osseous fusion across the 1st tarsometatarsal articulation. There is osseous fragmentation of the navicular bone with lytic lucency about the proximal screw fixation in the talus with resultant loosening.   2. Skin ulceration plantar and distal margin of the 1st digit about the 1st distal phalanx with surrounding cellulitis. No definite abscess formation. No loss of cortical margin or definite osteomyelitis.     MACRO: None   Signed by: Kay Lundy 1/10/2024 12:27 PM Dictation workstation:   [id number]         Assessment/Plan   Principal Problem:    Syncope, unspecified syncope type    Nuris Squires is a 58 y.o. female with PMHx of T2DM (HbA1c 13 01/2024) c/b neuropathy, HTN, CKD stage III, multiple foot infections s/p recent partial R big toe amputation in which she grew Group B strep and was on 6 week course of ceftriaxone via tunneled line (Serena catheter placed 01/15/24, CTX to be continued through 02/21/24), recent admission for PNA on 4L home O2 that presented on 02/02/24 with altered mental status and syncope. Patient noted to have hypoglycemia and was found to be significantly hypothermic on admission. Of note, patient has had recurrent hypoglycemia since admission and admission blood cultures (4/4) now growing coagulase negative staph, suggesting that bacteremia may be leading to recurrent hypoglycemia. Potential sources include  central line infection, secondary infection of toe, or potentially urine (patient notably grew VRE in urine culture on 01/31, but this was thought to be an asymptomatic colonization and was never treated) . Per ID, Serena line can be kept in as Bcx with coag negative Staph likely contaminant and discontinue vancomycin. Should continue ceftriaxone till 2/21 for GBS infection of R foot.      Cr 2.07 on 2/7, concerning for VENKAT iso CKD3. Prerenal VENKAT more likely. Trialed 1L bolus dose NS on over 2hr on 2/7, Cr downtrended to 1/89m, prerenal most likely etiology. Cr this AM at ***. Repeat UA positive for 1+ protein, 3+ glucose, and leukocyte esterase, WBC >50, RBC >20. Pending repeat UCx. Will keep on ceftriaxone for now.     Updates 2/8/2024:  - Cr today AM   - Encourage PO intake (off of fluid restriction)  - Repeat UA positive for leukocyte esterase, WBC >50, RBC >20  - Pending repeat urine culture  - No intervention needed per podiatry for foot wound, can fu outpatient with surgeon  - Given lantus on 5u AM and PM yesterday, start 8u lantus daily today  - Encourage IS use 10x/hr     #Coagulase Negative Staph bacteremia  #s/p R great toe partial amputation  #GBS osteomyelitis  #Hypothermia, resolved  - s/p R great toe partial amputation on 01/12 with podiatry for source control of infection  - intra-op culture grew rare Group B streptococcus  - R chest tunneled line placed 01/15 for 6 weeks of IV ceftriaxone planned end 02/21/2024  - Echo during last admission 01/25 shows LVEF 70-75%, mildly increased LV septal wall thickness, otherwise unremarkable  - Culture data  - Blood cultures x2 02/03/24, MRSE 4/4 bottles  - ID consulted, appreciate recs  - Switched from Zosyn to Ceftriaxone on 2/4 per ID recs  Plan:  - Repeat blood cultures from 2/5 with no growth at 2 days  - Discontinue IV Vancomycin (2/3-), continue ceftriaxone (2/4-2/21)  - ID not recommending Serena line removal at this time     #T2DM c/b neuropathy, CKD  III  #Recurrent hypoglycemia  - A1c 13.1% on 01/24/2024  - Home regimen: lantus 70 units, prandial lispro 10 units TID  - Got 50 units Lantus on evening of 2/3 with AM BG 2/4 in the 20's  - Suspect bacteremia is driving hypoglycemia  Plan:  - hold metformin  - given 5u lantus this AM, plan to give 3u lantus this PM  - increase to 8u daily starting tomorrow        #recent PNA  - discharged home on 4L O2 on 01/31/2024  Plan:  - incentive spirometer   - Wean O2 as tolerated for goal SpO2 > 92%     #HTN  - Continue home clonidine and amlodipine     #DLD  - continue atorvastatin 40 mg  - Continue home aspirin 81 mg (Can discuss stopping with patient as this appears to be for primary prevention)     #VENKAT on CKD Stage III  - baseline Cr around 1.4  - Cr up to 2.07 yesterday  Plan:  - Cr at *** today  - Encourage PO intake today     Diet: Diabetic 75 carb/meal 45 carb/snack  Access: PIV, tunneled line (placed 01/15/24)  DVT prophylaxis: Lovenox BID  Code status: Full code (confirmed on admission)  NOK: Brandon Rousseau (Significant Other)  [phone]          Stefanie Urban, MS-3  Seen and discussed with Dr. To Rodríguez (Resident) and Dr. Austin Nance

## 2024-02-09 NOTE — DISCHARGE INSTRUCTIONS
Contacted patient and advised him that the POAHC he brought to  clinic is incomplete.  Page 2 of the document is still missing.  Asked that get that to us at his earliest convenience so that we may scan it into his medical record.  Patient stated he would do that.   Dear Ms. Squires,     You were admitted to the hospital for altered mental status and passing out. You were found to have an infection of your foot for which you received antibiotics and you improved. While here, you had an acute kidney injury which improved over time. We also did a urine culture, and it grew a bacteria called Enterococcus, which is the same bacteria you grew during your hospital admission for pneumonia on 1/29. We treated you with antibiotics for that for 2 days, but our infectious disease doctors recommended that we stop it since you were not experiencing any urinary symptoms. If you do experience any fevers, chills, burning on urination, trouble urinating or increased urinary frequency, please go to the ED for further evaluation.    In addition, you came into the hospital taking 70 units of basaglar at night and 10 units of lispro with meals. Since you had episodes of hypoglycemia while at the hospital, we are starting you off with a lower dose of 12 units of basaglar at night. Please check your blood sugars in the mornings and if you notice that they are above 150 for a few days, please increase your insulin to 15 units of basaglar at night. You will be on a mild sliding scale for the insulin lispro that you take with meals.    You will follow up with podiatry outpatient and continue taking ceftriaxone for your foot infection through 2/21/2024. You will also follow up with infectious disease outpatient.    We greatly enjoyed taking care of you and wish you all the best.     [facility]

## 2024-02-09 NOTE — CARE PLAN
The patient's goals for the shift include Feel better    The clinical goals for the shift include pt will experience no dizziness for duration of shift.     Over the shift, the patient made progress towards all goals.

## 2024-02-10 ENCOUNTER — TELEPHONE (OUTPATIENT)
Dept: HOME HEALTH SERVICES | Facility: HOME HEALTH | Age: 59
End: 2024-02-10
Payer: COMMERCIAL

## 2024-02-10 ENCOUNTER — HOME INFUSION (OUTPATIENT)
Dept: INFUSION THERAPY | Age: 59
End: 2024-02-10
Payer: COMMERCIAL

## 2024-02-10 VITALS
OXYGEN SATURATION: 94 % | RESPIRATION RATE: 17 BRPM | TEMPERATURE: 97.9 F | BODY MASS INDEX: 41.41 KG/M2 | WEIGHT: 225 LBS | HEIGHT: 62 IN | DIASTOLIC BLOOD PRESSURE: 72 MMHG | SYSTOLIC BLOOD PRESSURE: 157 MMHG | HEART RATE: 68 BPM

## 2024-02-10 LAB
ALBUMIN SERPL BCP-MCNC: 2.9 G/DL (ref 3.4–5)
ANION GAP SERPL CALC-SCNC: 12 MMOL/L (ref 10–20)
BASOPHILS # BLD AUTO: 0.07 X10*3/UL (ref 0–0.1)
BASOPHILS NFR BLD AUTO: 1.3 %
BUN SERPL-MCNC: 18 MG/DL (ref 6–23)
CALCIUM SERPL-MCNC: 8.8 MG/DL (ref 8.6–10.6)
CHLORIDE SERPL-SCNC: 107 MMOL/L (ref 98–107)
CO2 SERPL-SCNC: 27 MMOL/L (ref 21–32)
CREAT SERPL-MCNC: 1.9 MG/DL (ref 0.5–1.05)
EGFRCR SERPLBLD CKD-EPI 2021: 30 ML/MIN/1.73M*2
EOSINOPHIL # BLD AUTO: 0.14 X10*3/UL (ref 0–0.7)
EOSINOPHIL NFR BLD AUTO: 2.7 %
ERYTHROCYTE [DISTWIDTH] IN BLOOD BY AUTOMATED COUNT: 13.2 % (ref 11.5–14.5)
GLUCOSE BLD MANUAL STRIP-MCNC: 183 MG/DL (ref 74–99)
GLUCOSE BLD MANUAL STRIP-MCNC: 267 MG/DL (ref 74–99)
GLUCOSE SERPL-MCNC: 204 MG/DL (ref 74–99)
HCT VFR BLD AUTO: 26.2 % (ref 36–46)
HGB BLD-MCNC: 8.2 G/DL (ref 12–16)
IMM GRANULOCYTES # BLD AUTO: 0.01 X10*3/UL (ref 0–0.7)
IMM GRANULOCYTES NFR BLD AUTO: 0.2 % (ref 0–0.9)
LYMPHOCYTES # BLD AUTO: 1.92 X10*3/UL (ref 1.2–4.8)
LYMPHOCYTES NFR BLD AUTO: 36.6 %
MAGNESIUM SERPL-MCNC: 2.2 MG/DL (ref 1.6–2.4)
MCH RBC QN AUTO: 30.1 PG (ref 26–34)
MCHC RBC AUTO-ENTMCNC: 31.3 G/DL (ref 32–36)
MCV RBC AUTO: 96 FL (ref 80–100)
MONOCYTES # BLD AUTO: 0.57 X10*3/UL (ref 0.1–1)
MONOCYTES NFR BLD AUTO: 10.9 %
NEUTROPHILS # BLD AUTO: 2.53 X10*3/UL (ref 1.2–7.7)
NEUTROPHILS NFR BLD AUTO: 48.3 %
NRBC BLD-RTO: 0 /100 WBCS (ref 0–0)
PHOSPHATE SERPL-MCNC: 4.3 MG/DL (ref 2.5–4.9)
PLATELET # BLD AUTO: 354 X10*3/UL (ref 150–450)
POTASSIUM SERPL-SCNC: 4.1 MMOL/L (ref 3.5–5.3)
RBC # BLD AUTO: 2.72 X10*6/UL (ref 4–5.2)
SODIUM SERPL-SCNC: 142 MMOL/L (ref 136–145)
WBC # BLD AUTO: 5.2 X10*3/UL (ref 4.4–11.3)

## 2024-02-10 PROCEDURE — 84100 ASSAY OF PHOSPHORUS: CPT

## 2024-02-10 PROCEDURE — 2500000004 HC RX 250 GENERAL PHARMACY W/ HCPCS (ALT 636 FOR OP/ED)

## 2024-02-10 PROCEDURE — 82947 ASSAY GLUCOSE BLOOD QUANT: CPT

## 2024-02-10 PROCEDURE — 2500000002 HC RX 250 W HCPCS SELF ADMINISTERED DRUGS (ALT 637 FOR MEDICARE OP, ALT 636 FOR OP/ED)

## 2024-02-10 PROCEDURE — 85025 COMPLETE CBC W/AUTO DIFF WBC: CPT

## 2024-02-10 PROCEDURE — 99239 HOSP IP/OBS DSCHRG MGMT >30: CPT | Performed by: INTERNAL MEDICINE

## 2024-02-10 PROCEDURE — 83735 ASSAY OF MAGNESIUM: CPT

## 2024-02-10 PROCEDURE — 2500000001 HC RX 250 WO HCPCS SELF ADMINISTERED DRUGS (ALT 637 FOR MEDICARE OP)

## 2024-02-10 RX ADMIN — AMLODIPINE BESYLATE 10 MG: 10 TABLET ORAL at 09:05

## 2024-02-10 RX ADMIN — ASPIRIN 81 MG 81 MG: 81 TABLET ORAL at 06:49

## 2024-02-10 RX ADMIN — ATORVASTATIN CALCIUM 40 MG: 40 TABLET, FILM COATED ORAL at 09:05

## 2024-02-10 RX ADMIN — PANTOPRAZOLE SODIUM 40 MG: 40 TABLET, DELAYED RELEASE ORAL at 06:48

## 2024-02-10 RX ADMIN — NYSTATIN 1 APPLICATION: 100000 POWDER TOPICAL at 09:05

## 2024-02-10 RX ADMIN — INSULIN LISPRO 1 UNITS: 100 INJECTION, SOLUTION INTRAVENOUS; SUBCUTANEOUS at 09:06

## 2024-02-10 RX ADMIN — Medication 2000 UNITS: at 06:49

## 2024-02-10 RX ADMIN — CLONIDINE HYDROCHLORIDE 0.1 MG: 0.1 TABLET ORAL at 09:04

## 2024-02-10 RX ADMIN — ENOXAPARIN SODIUM 40 MG: 100 INJECTION SUBCUTANEOUS at 09:04

## 2024-02-10 RX ADMIN — INSULIN LISPRO 3 UNITS: 100 INJECTION, SOLUTION INTRAVENOUS; SUBCUTANEOUS at 13:55

## 2024-02-10 RX ADMIN — Medication 400 MG: at 09:05

## 2024-02-10 ASSESSMENT — COGNITIVE AND FUNCTIONAL STATUS - GENERAL
MOVING TO AND FROM BED TO CHAIR: A LITTLE
DAILY ACTIVITIY SCORE: 19
PERSONAL GROOMING: A LITTLE
WALKING IN HOSPITAL ROOM: A LITTLE
MOBILITY SCORE: 18
HELP NEEDED FOR BATHING: A LITTLE
DRESSING REGULAR UPPER BODY CLOTHING: A LITTLE
TURNING FROM BACK TO SIDE WHILE IN FLAT BAD: A LITTLE
TOILETING: A LITTLE
STANDING UP FROM CHAIR USING ARMS: A LITTLE
CLIMB 3 TO 5 STEPS WITH RAILING: A LOT
EATING MEALS: A LITTLE

## 2024-02-10 ASSESSMENT — PAIN SCALES - GENERAL
PAINLEVEL_OUTOF10: 0 - NO PAIN

## 2024-02-10 ASSESSMENT — PAIN - FUNCTIONAL ASSESSMENT
PAIN_FUNCTIONAL_ASSESSMENT: 0-10
PAIN_FUNCTIONAL_ASSESSMENT: 0-10

## 2024-02-10 NOTE — TELEPHONE ENCOUNTER
HOME CARE Referral processed for [facility] 2/12/2024 .   PHARMACY TO DELIVER IV ABX. MEDICATION ON 2/10, PATIENT TO SELF ADMINISTER ON 2/11.

## 2024-02-10 NOTE — DISCHARGE SUMMARY
Discharge Diagnosis  Syncope, unspecified syncope type  GBS osteomyelitis/ diabetic foot infection  Hypoglycemia  DM type 2, Insulin dependant  CKD    Issues Requiring Follow-Up  - CKD, potentially starting ACEi/ARB when VENKAT has resolved  - osteomyelitis to follow up with ID and podiatry, continue ceftriaxone    Test Results Pending At Discharge  Pending Labs       Order Current Status    Extra Urine Gray Tube Collected (02/07/24 1127)    Urinalysis with Reflex Culture and Microscopic In process    Urine Culture Preliminary result            Hospital Course  Nuris Squires is a 58 y.o. female with PMH T2DM on home insulin (uncontrolled) c/b neuropathy, HTN, CKD stage III, multiple foot infections s/p recent partial R great toe amputation (1/12), recent admission for PNA (1/24-1/31) on 4L home O2 presenting with altered mental status, syncope, hypothermia, and hypoglycemia. 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 She had also had a positive urine culture for E faecium on 1/29 during her previous admission, though patient was asymptomatic. It was unclear whether her symptoms at presentation were related to orthostasis vs a possible infectious cause. Upon admission she was started on IV Zosyn, vancomycin, and nitrofurantoin for possible E faecium UTI. ID consulted at that time, and nitrofurantoin was discontinued on 2/5 because patient was asymptomatic per ID recommendations. Peripheral blood cultures collected on 2/3 were positive 4/4 bottles for methicillin-resistant staph epidermidis. ID was consulted regarding antibiotic therapy and need to remove central line. Repeat blood cultures on 2/5 were NGTD. ID believed the MRSE was contaminant, recommended keeping in the central line, discontinuing vancomycin (discontinued on 2/6). They recommended to continue ceftriaxone for GBS osteomyelitis until 2/21  as previously planned.     UA obtained on 2/9 as part of VENKAT workup was positive for leukocyte esterase, WBC and RBC, and urine culture was positive for Enterococcus faecium. However, patient denied any dysuria, hematuria or changes in urination. Antibiotics not given at the time of discharge due to lack of urinary symptoms, but patient counseled extensively on need to present to ED if develops fevers, chills, burning on urination, changes in urinary frequency or hematuria.    Regarding her hypoglycemia, she was originally on 70 units lantus daily with mealtime lispro at home for poorly controlled T2DM w/ A1c of 13, though peculiarly was persistently hypoglycemic down to the 20s-40s despite lowering and eventual cessation of her insulin regimen. Hypoglycemia was thought to potentially be related to bacteremia and began improving on 2/6 with blood glucoses in the upper 100s-mid 200s. Gradually increased lantus from 5u to 8u at bedtime for the next few days, with plan to discharge on 12u lantus at bedtime, with instructions to increase to 15u lantus if blood glucose readings above 150s for a few days. Will be on mild SSI at mealtimes on discharge. Given diabetes history, may consider starting on an ACEi or ARB once VENKAT resolves.    She had VENKAT on CKD III with Cr of 1.7-2.0 (baseline 1.4-1.5) which was fluid responsive and improved with hydration.    Initially came in on 4L home O2 from recent PNA though was able to be weaned to 2L NC prior to discharge home.    Her amputation wound from January was evaluated by podiatry who recommended outpatient follow up.    Pertinent Physical Exam At Time of Discharge  Cardiovascular:      Rate and Rhythm: Normal rate and regular rhythm.   Pulmonary:      Breath sounds: Normal breath sounds.   Abdominal:      General: Abdomen is flat. Bowel sounds are normal.      Palpations: Abdomen is soft.      Tenderness: There is abdominal tenderness in the epigastric area.   Musculoskeletal:       Comments: R big toe amputation and L second toe amputation. Feet appear dry and flaky, but with no erythema, tenderness or purulence.     Home Medications     Medication List      CHANGE how you take these medications     insulin lispro 100 unit/mL injection; Commonly known as: HumaLOG; What   changed: Another medication with the same name was removed. Continue   taking this medication, and follow the directions you see here.   Lantus U-100 Insulin 100 unit/mL injection; Generic drug: insulin   glargine; What changed: Another medication with the same name was removed.   Continue taking this medication, and follow the directions you see here.     CONTINUE taking these medications     acetaminophen 325 mg tablet; Commonly known as: Tylenol; Take 2 tablets   (650 mg) by mouth every 4 hours if needed for mild pain (1 - 3).   amLODIPine 10 mg tablet; Commonly known as: Norvasc; Take 1 tablet (10   mg) by mouth once daily.   aspirin 81 mg chewable tablet   atorvastatin 40 mg tablet; Commonly known as: Lipitor; Take 1 tablet (40   mg) by mouth once daily.   cefTRIAXone 2 gram; Commonly known as: Rocephin; Infuse 2 g into a   venous catheter once daily for 21 days.   cefTRIAXone 2 gram/50 mL IV; Commonly known as: Rocephin; Infuse 50 mL   (2 g) at 100 mL/hr over 30 minutes into a venous catheter once every 24   hours for 37 doses.   cholecalciferol 50 mcg (2,000 unit) capsule; Commonly known as: Vitamin   D-3   cloNIDine 0.1 mg tablet; Commonly known as: Catapres; Take 1 tablet (0.1   mg) by mouth 2 times a day.   FreeStyle [place] 3 [place] misc; Generic drug: blood-glucose   meter,continuous; Use as instructed to monitor blood glucose.   FreeStyle Remington 3 Sensor device; Generic drug: blood-glucose sensor; Use   to monitor blood glucose. Change sensor every 14 days.   magnesium oxide 400 mg (241.3 mg magnesium) tablet; Commonly known as:   Mag-Ox; Take 1 tablet (400 mg) by mouth once daily. Do not start before   January  19, 2024.   meclizine 25 mg tablet,chewable; Commonly known as: Antivert; Chew 1   tablet (25 mg) 3 times a day as needed (vertigo).   metFORMIN 500 mg tablet; Commonly known as: Glucophage   OneTouch Delica Plus Lancet 30 gauge misc; Generic drug: lancets   OneTouch Ultra2 Meter misc; Generic drug: blood-glucose meter   * OneTouch Verio test strips strip; Generic drug: blood sugar diagnostic   * blood sugar diagnostic strip; 1 strip 4 times a day.   oxygen gas therapy; Commonly known as: O2; Inhale 1 each once every 24   hours.  * This list has 2 medication(s) that are the same as other medications   prescribed for you. Read the directions carefully, and ask your doctor or   other care provider to review them with you.     STOP taking these medications     alteplase 2 mg injection; Commonly known as: Cathflo Activase       Outpatient Follow-Up  Future Appointments   Date Time Provider Department Center   2/12/2024 To Be Determined Sheba Lawson RN [facility]   2/14/2024 To Be Determined Enzo Kemp, PT [facility]   2/16/2024 To Be Determined Tammi Syed OT [facility]   2/29/2024 10:00 AM PHARMACY [facility] [id number]   3/20/2024 10:15 AM Betty Bartlett OD [id number]       Melba Andrew MD

## 2024-02-10 NOTE — PROGRESS NOTES
Orders received for patient to discharge home today and resume ceftriaxone 2gm q24h as ordered prior to hospital admit thru 2/21/24. Patient was followed by Dr Walden while inpatient, but she will follow up with Dr Hickey outpatient. Patient to schedule an appointment in 2 weeks.    Review of chart and pt was sent 7 doses of ceftriaxone the day after she was readmitted to hospital 1/24/24.    Tel call with patient. She still has doses in the home from this delivery. (30day exp date since in MB+). Patient will call on call service tonight/tomorrow if she gets home and does not have theses doses/supplies in the home like she thought. Otherwise patient is agreeable to follow up call Monday to check inventory of meds/supplies in the home to determine when next delivery should be made.    Follow up 2/12/24. Check inventory of ceftriaxone with patient. Schedule next delivery as appropriate.

## 2024-02-11 LAB — BACTERIA UR CULT: ABNORMAL

## 2024-02-12 ENCOUNTER — HOME CARE VISIT (OUTPATIENT)
Dept: HOME HEALTH SERVICES | Facility: HOME HEALTH | Age: 59
End: 2024-02-12
Payer: COMMERCIAL

## 2024-02-12 ENCOUNTER — PATIENT OUTREACH (OUTPATIENT)
Dept: PRIMARY CARE | Facility: CLINIC | Age: 59
End: 2024-02-12
Payer: COMMERCIAL

## 2024-02-12 ENCOUNTER — HOME INFUSION (OUTPATIENT)
Dept: INFUSION THERAPY | Age: 59
End: 2024-02-12
Payer: COMMERCIAL

## 2024-02-12 DIAGNOSIS — E16.2 HYPOGLYCEMIA: ICD-10-CM

## 2024-02-12 DIAGNOSIS — L08.9 DIABETIC FOOT INFECTION (MULTI): ICD-10-CM

## 2024-02-12 DIAGNOSIS — R55 SYNCOPE, UNSPECIFIED SYNCOPE TYPE: ICD-10-CM

## 2024-02-12 DIAGNOSIS — E11.628 DIABETIC FOOT INFECTION (MULTI): ICD-10-CM

## 2024-02-12 PROCEDURE — G0162 HHC RN E&M PLAN SVS, 15 MIN: HCPCS

## 2024-02-12 RX ORDER — INSULIN LISPRO 100 [IU]/ML
12 INJECTION, SOLUTION INTRAVENOUS; SUBCUTANEOUS NIGHTLY
COMMUNITY
End: 2024-02-20 | Stop reason: WASHOUT

## 2024-02-12 NOTE — PROGRESS NOTES
[facility] followed up with pt to check inventory of meds/supplies. Per pt, she has enough meds and supplies to last thru at least Fri 2/16. She is agreeable to call back that day to assess delivery needs going forward.

## 2024-02-12 NOTE — PROGRESS NOTES
Discharge Facility: [facility]  Discharge Diagnosis: Syncope, unspecified type, GBS osteomyelitis/ diabetic foot infection, Hypoglycemia   Admission Date: 2/3/2024  Discharge Date: 2/10/2024    PCP Appointment Date: 2/20/2024 11:00  Specialist Appointment Date: 3/14/2024 08:00 Dr. Katrina Connolly, Pulmonology  Hospital Encounter and Summary: Linked   See discharge assessment below for further details    Medications  Medications reviewed with patient/caregiver?: Yes (Patient states she is taking insulin lispro 12 units at bedtime. This is not on the discharge med list, but in her discharge instructions.) (2/12/2024  3:37 PM)  Is the patient having any side effects they believe may be caused by any medication additions or changes?: No (2/12/2024  3:37 PM)  Does the patient have all medications ordered at discharge?: Yes (2/12/2024  3:37 PM)  Prescription Comments: Task sent to [facility] pharmacy to assist in clarifying insulin regime. (2/12/2024  3:37 PM)    Appointments  Does the patient have a primary care provider?: Yes (2/12/2024  3:37 PM)  Care Management Interventions: Verified appointment date/time/provider (2/12/2024  3:37 PM)  Has the patient kept scheduled appointments due by today?: Not applicable (2/12/2024  3:37 PM)    Self Management  What is the home health agency?: [facility] (2/12/2024  3:37 PM)  Has home health visited the patient within 72 hours of discharge?: Yes (2/12/2024  3:37 PM)    Patient Teaching  Does the patient have access to their discharge instructions?: Yes (2/12/2024  3:37 PM)  Care Management Interventions: Reviewed instructions with patient (2/12/2024  3:37 PM)  What is the patient's perception of their health status since discharge?: Improving (States is feeling much better. No pain in foot.) (2/12/2024  3:37 PM)  Is the patient/caregiver able to teach back the hierarchy of who to call/visit for symptoms/problems? PCP, Specialist, Home Health nurse, Urgent Care, ED, 911: Yes (2/12/2024  3:37  PM)    Wrap Up  Wrap Up Additional Comments: 58yoF PMHx of T2DM on home insulin (uncontrolled) c/b neuropathy, HTN, CKD stage III, multiple foot infections s/p recent partial R great toe amputation (1/12), recent admission for PNA (1/24-1/31) on 4L home O2 presenting with altered mental status, syncope, hypothermia, and hypoglycemia. 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 ID consulted and patient continued on IV ceftraxione. Diabetic medications adjusted. O2 weaned to 2L/NC. Patient to follow up with podiatry for wound care. [facility] resumed. (2/12/2024  3:37 PM)

## 2024-02-13 ENCOUNTER — TELEPHONE (OUTPATIENT)
Dept: CARE COORDINATION | Facility: CLINIC | Age: 59
End: 2024-02-13
Payer: COMMERCIAL

## 2024-02-13 ENCOUNTER — HOME CARE VISIT (OUTPATIENT)
Dept: HOME HEALTH SERVICES | Facility: HOME HEALTH | Age: 59
End: 2024-02-13
Payer: COMMERCIAL

## 2024-02-13 VITALS
TEMPERATURE: 97.6 F | HEART RATE: 88 BPM | HEIGHT: 62 IN | BODY MASS INDEX: 41.41 KG/M2 | OXYGEN SATURATION: 96 % | DIASTOLIC BLOOD PRESSURE: 88 MMHG | RESPIRATION RATE: 16 BRPM | SYSTOLIC BLOOD PRESSURE: 130 MMHG | WEIGHT: 225 LBS

## 2024-02-13 PROCEDURE — G0152 HHCP-SERV OF OT,EA 15 MIN: HCPCS

## 2024-02-13 SDOH — HEALTH STABILITY: MENTAL HEALTH: SMOKING IN HOME: 1

## 2024-02-13 SDOH — HEALTH STABILITY: MENTAL HEALTH: SMOKING IN HOME: 0

## 2024-02-13 SDOH — ECONOMIC STABILITY: HOUSING INSECURITY
HOME SAFETY: O2 SAFETY COMPLETED REOMMEND PATIENT CONTACT FIRE DEPT FOT SMOKE DETECTOR AND TO ALERT THEM TO HAVING O2 IN HOME.

## 2024-02-13 SDOH — ECONOMIC STABILITY: HOUSING INSECURITY: EVIDENCE OF SMOKING MATERIAL: 1

## 2024-02-13 SDOH — ECONOMIC STABILITY: HOUSING INSECURITY: EVIDENCE OF SMOKING MATERIAL: 0

## 2024-02-13 ASSESSMENT — ACTIVITIES OF DAILY LIVING (ADL)
LAUNDRY ASSESSED: 1
LIGHT HOUSEKEEPING: DEPENDENT
GROOMING ASSESSED: 1
LAUNDRY: DEPENDENT
OASIS_M1830: 05
FEEDING ASSESSED: 1
CURRENT_FUNCTION: MINIMUM ASSIST
TRANSPORTATION: DEPENDENT
ORAL_CARE_CURRENT_FUNCTION: INDEPENDENT
BATHING_CURRENT_FUNCTION: MINIMUM ASSIST
TELEPHONE USE ASSESSED: 1
ORAL_CARE_ASSESSED: 1
SHOPPING: DEPENDENT
SHOPPING ASSESSED: 1
PREPARING MEALS: DEPENDENT
TOILETING: 1
GROOMING_CURRENT_FUNCTION: INDEPENDENT
DRESSING_UB_CURRENT_FUNCTION: INDEPENDENT
PHYSICAL TRANSFERS ASSESSED: 1
ENTERING_EXITING_HOME: NEEDS ASSISTANCE
FEEDING: INDEPENDENT
TOILETING: INDEPENDENT
DRESSING_LB_CURRENT_FUNCTION: MINIMUM ASSIST
USING THE TELPHONE: INDEPENDENT
HOUSEKEEPING ASSESSED: 1
TRANSPORTATION ASSESSED: 1
BATHING ASSESSED: 1

## 2024-02-13 ASSESSMENT — ENCOUNTER SYMPTOMS
APPETITE LEVEL: GOOD
LOSS OF SENSATION IN FEET: 1
DEPRESSION: 0
DENIES PAIN: 1
LAST BOWEL MOVEMENT: 66882
OCCASIONAL FEELINGS OF UNSTEADINESS: 1
PERSON REPORTING PAIN: PATIENT
LOWER EXTREMITY EDEMA: 1
CHANGE IN APPETITE: UNCHANGED
DENIES PAIN: 1

## 2024-02-13 ASSESSMENT — PAIN SCALES - PAIN ASSESSMENT IN ADVANCED DEMENTIA (PAINAD)
FACIALEXPRESSION: 0
CONSOLABILITY: 0
BODYLANGUAGE: 0 - RELAXED.
NEGVOCALIZATION: 0 - NONE.
BREATHING: 0
CONSOLABILITY: 0 - NO NEED TO CONSOLE.
FACIALEXPRESSION: 0 - SMILING OR INEXPRESSIVE.
BODYLANGUAGE: 0
NEGVOCALIZATION: 0
TOTALSCORE: 0

## 2024-02-15 ENCOUNTER — APPOINTMENT (OUTPATIENT)
Dept: WOUND CARE | Facility: HOSPITAL | Age: 59
End: 2024-02-15
Payer: COMMERCIAL

## 2024-02-16 ENCOUNTER — HOME INFUSION (OUTPATIENT)
Dept: INFUSION THERAPY | Age: 59
End: 2024-02-16
Payer: COMMERCIAL

## 2024-02-16 NOTE — PROGRESS NOTES
[facility] followed up with pt to check inventory of meds/supplies. Per pt, she has enough meds and supplies to last thru the weekend. She is agreeable to call back on 2/20 to assess delivery needs going forward.

## 2024-02-16 NOTE — PROGRESS NOTES
Chart review: patient receiving ceftriaxone 2gm q24 thru 2/21 for right foot infection. Dr Hickey following.     [facility] rec'd call from patient, when she went home she found she was out of medication after dose used today. Agreeable to delivery of medication and supplies 2/16.    Pharmacy to mix and deliver straight 2/16 with supplies to match:  5x ceftriaxone 2gm MB+  DOS: 2/17 - 2/21    Follow up 2/21, plan of care Dr Hickey

## 2024-02-19 ENCOUNTER — HOME CARE VISIT (OUTPATIENT)
Dept: HOME HEALTH SERVICES | Facility: HOME HEALTH | Age: 59
End: 2024-02-19
Payer: COMMERCIAL

## 2024-02-19 VITALS — OXYGEN SATURATION: 94 %

## 2024-02-19 DIAGNOSIS — E11.3511 TYPE 2 DIABETES MELLITUS WITH RIGHT EYE AFFECTED BY PROLIFERATIVE RETINOPATHY AND MACULAR EDEMA, WITH LONG-TERM CURRENT USE OF INSULIN (MULTI): Primary | ICD-10-CM

## 2024-02-19 DIAGNOSIS — Z79.4 TYPE 2 DIABETES MELLITUS WITH RIGHT EYE AFFECTED BY PROLIFERATIVE RETINOPATHY AND MACULAR EDEMA, WITH LONG-TERM CURRENT USE OF INSULIN (MULTI): Primary | ICD-10-CM

## 2024-02-19 PROCEDURE — 0023 HH SOC

## 2024-02-19 PROCEDURE — G0299 HHS/HOSPICE OF RN EA 15 MIN: HCPCS

## 2024-02-19 PROCEDURE — G0152 HHCP-SERV OF OT,EA 15 MIN: HCPCS

## 2024-02-19 SDOH — HEALTH STABILITY: MENTAL HEALTH: SMOKING IN HOME: 0

## 2024-02-19 SDOH — ECONOMIC STABILITY: HOUSING INSECURITY: EVIDENCE OF SMOKING MATERIAL: 0

## 2024-02-19 ASSESSMENT — ENCOUNTER SYMPTOMS
APPETITE LEVEL: GOOD
PERSON REPORTING PAIN: PATIENT
DENIES PAIN: 1
LAST BOWEL MOVEMENT: 66888

## 2024-02-19 ASSESSMENT — ACTIVITIES OF DAILY LIVING (ADL): DRESSING_LB_CURRENT_FUNCTION: INDEPENDENT

## 2024-02-20 ENCOUNTER — OFFICE VISIT (OUTPATIENT)
Dept: PRIMARY CARE | Facility: CLINIC | Age: 59
End: 2024-02-20
Payer: COMMERCIAL

## 2024-02-20 VITALS
SYSTOLIC BLOOD PRESSURE: 114 MMHG | WEIGHT: 225 LBS | HEIGHT: 62 IN | TEMPERATURE: 97 F | DIASTOLIC BLOOD PRESSURE: 70 MMHG | BODY MASS INDEX: 41.41 KG/M2 | HEART RATE: 99 BPM | OXYGEN SATURATION: 98 %

## 2024-02-20 DIAGNOSIS — E78.2 MIXED HYPERLIPIDEMIA: ICD-10-CM

## 2024-02-20 DIAGNOSIS — E66.01 MORBID OBESITY WITH BODY MASS INDEX (BMI) OF 40.0 OR HIGHER (MULTI): ICD-10-CM

## 2024-02-20 DIAGNOSIS — N18.4 CHRONIC RENAL DISEASE, STAGE IV (MULTI): ICD-10-CM

## 2024-02-20 DIAGNOSIS — E11.43 DIABETIC AUTONOMIC NEUROPATHY ASSOCIATED WITH TYPE 2 DIABETES MELLITUS (MULTI): Primary | ICD-10-CM

## 2024-02-20 DIAGNOSIS — I10 BENIGN ESSENTIAL HYPERTENSION: ICD-10-CM

## 2024-02-20 PROCEDURE — 99495 TRANSJ CARE MGMT MOD F2F 14D: CPT | Performed by: FAMILY MEDICINE

## 2024-02-20 PROCEDURE — 3074F SYST BP LT 130 MM HG: CPT | Performed by: FAMILY MEDICINE

## 2024-02-20 PROCEDURE — 3078F DIAST BP <80 MM HG: CPT | Performed by: FAMILY MEDICINE

## 2024-02-20 PROCEDURE — 3060F POS MICROALBUMINURIA REV: CPT | Performed by: FAMILY MEDICINE

## 2024-02-20 PROCEDURE — 3046F HEMOGLOBIN A1C LEVEL >9.0%: CPT | Performed by: FAMILY MEDICINE

## 2024-02-20 PROCEDURE — 1036F TOBACCO NON-USER: CPT | Performed by: FAMILY MEDICINE

## 2024-02-20 RX ORDER — INSULIN GLARGINE 100 [IU]/ML
12 INJECTION, SOLUTION SUBCUTANEOUS NIGHTLY
COMMUNITY
End: 2024-04-25 | Stop reason: HOSPADM

## 2024-02-20 ASSESSMENT — PAIN SCALES - GENERAL: PAINLEVEL: 0-NO PAIN

## 2024-02-20 ASSESSMENT — PATIENT HEALTH QUESTIONNAIRE - PHQ9
SUM OF ALL RESPONSES TO PHQ9 QUESTIONS 1 AND 2: 0
1. LITTLE INTEREST OR PLEASURE IN DOING THINGS: NOT AT ALL
2. FEELING DOWN, DEPRESSED OR HOPELESS: NOT AT ALL

## 2024-02-20 ASSESSMENT — ENCOUNTER SYMPTOMS
OCCASIONAL FEELINGS OF UNSTEADINESS: 0
DEPRESSION: 0
LOSS OF SENSATION IN FEET: 0

## 2024-02-20 NOTE — PROGRESS NOTES
"Subjective   Patient ID: Nuris Squires is a 58 y.o. female who presents for Follow-up ( Patient is In For a Follow-up From a Recent  Hospital Discharge.).  Copied  from TCM note:  \"Discharge Facility: [facility]  Discharge Diagnosis: Syncope, unspecified type, GBS osteomyelitis/ diabetic foot infection, Hypoglycemia   Admission Date: 2/3/2024  Discharge Date: 2/10/2024     PCP Appointment Date: 2/20/2024 11:00  Specialist Appointment Date: 3/14/2024 08:00 Dr. Katrina Connolly, Pulmonology  Hospital Encounter and Summary: Linked   See discharge assessment below for further details     Medications  Medications reviewed with patient/caregiver?: Yes (Patient states she is taking insulin lispro 12 units at bedtime. This is not on the discharge med list, but in her discharge instructions.) (2/12/2024  3:37 PM)  Is the patient having any side effects they believe may be caused by any medication additions or changes?: No (2/12/2024  3:37 PM)  Does the patient have all medications ordered at discharge?: Yes (2/12/2024  3:37 PM)  Prescription Comments: Task sent to [facility] pharmacy to assist in clarifying insulin regime. (2/12/2024  3:37 PM)     Appointments  Does the patient have a primary care provider?: Yes (2/12/2024  3:37 PM)  Care Management Interventions: Verified appointment date/time/provider (2/12/2024  3:37 PM)  Has the patient kept scheduled appointments due by today?: Not applicable (2/12/2024  3:37 PM)     Self Management  What is the home health agency?: [facility] (2/12/2024  3:37 PM)  Has home health visited the patient within 72 hours of discharge?: Yes (2/12/2024  3:37 PM)     Patient Teaching  Does the patient have access to their discharge instructions?: Yes (2/12/2024  3:37 PM)  Care Management Interventions: Reviewed instructions with patient (2/12/2024  3:37 PM)  What is the patient's perception of their health status since discharge?: Improving (States is feeling much better. No pain in foot.) (2/12/2024  3:37 " "PM)  Is the patient/caregiver able to teach back the hierarchy of who to call/visit for symptoms/problems? PCP, Specialist, Home Health nurse, Urgent Care, ED, 911: Yes (2/12/2024  3:37 PM)     Wrap Up  Wrap Up Additional Comments: 58yoF PMHx of T2DM on home insulin (uncontrolled) c/b neuropathy, HTN, CKD stage III, multiple foot infections s/p recent partial R great toe amputation (1/12), recent admission for PNA (1/24-1/31) on 4L home O2 presenting with altered mental status, syncope, hypothermia, and hypoglycemia. 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 ID consulted and patient continued on IV ceftraxione. Diabetic medications adjusted. O2 weaned to 2L/NC. Patient to follow up with podiatry for wound care. [facility] resumed. (2/12/2024  3:37 PM)\"     Patient has been doing pretty well  since  home.  Has  follow up appointment with wound clinic  to take stitches out.  Has been up walking  some.  No  pain.  Gait is a little off.  Will get last antibiotic infusion and see specialist  this  week.    Glucose doing better, but still running high.  Using freestyle hakeem for diabetes monitoring.  Glucose  220 this  AM.  Has been  160-200 in AM  Checks prior to meals, and if high, will take the quick acting  insulin.  Is on basaglar 12 units at night.  If glucose high, told to  take  15 units.  Still  taking metformin.  GFR 30 while in hospital. Had been hovering that area even prior.    Checking BP at home.  With wrist  BP cuff, and it is pretty good.  No swelling in ankles.    Has HHC coming to the house.  Not sure how long they will  be needed.                Review of Systems    Objective   /70 (BP Location: Right arm, Patient Position: Sitting, BP Cuff Size: Adult)   Pulse 99   Temp 36.1 °C (97 °F) (Oral)   Ht 1.575 m (5' 2\")   Wt 102 kg (225 lb)   SpO2 98%   BMI 41.15 kg/m²     Physical " Exam  Vitals reviewed.   Constitutional:       Appearance: Normal appearance.   Cardiovascular:      Rate and Rhythm: Normal rate and regular rhythm.      Heart sounds: No murmur heard.  Pulmonary:      Effort: Pulmonary effort is normal.      Breath sounds: Normal breath sounds.   Musculoskeletal:      Right lower leg: No edema.      Left lower leg: No edema.      Comments: Right foot bandaged.   Neurological:      Mental Status: She is alert.   Psychiatric:         Mood and Affect: Mood normal.         Behavior: Behavior normal.           Assessment/Plan   Problem List Items Addressed This Visit       Benign essential hypertension    Diabetic autonomic neuropathy associated with type 2 diabetes mellitus (CMS/HCC) - Primary    Relevant Orders    Referral to Endocrinology    Mixed hyperlipidemia    Morbid obesity with body mass index (BMI) of 40.0 or higher (CMS/HCC)    Chronic renal disease, stage IV (CMS/HCC)

## 2024-02-20 NOTE — PATIENT INSTRUCTIONS
Follow up of hospitalization for syncope, GBS osteomyelitis/diabetic foot infection.  Partial right great toe amputation on  1/12/24.  Feeling well.  Will be following up with wound  clinic, podiatrist, pulmonologist.    Diabetes still  not well controlled.  Will stop metformin, since kidney function is reduced.  Increase Basaglar to  15 units at night.  Continue  lispro insulin with meals.  Continue to use freestyle hakeem for diabetes monitoring.  Planning to see nutritionist.  Will refer to endocrinologist as well.    BP  is well controlled.  Continue amlodipine, clonidine.    For cholesterol, taking atorvastatin.    Plan to follow up in our office  in 1-2  months.  Will recheck blood work at that time (CBC,  CMP, possibly A1C)

## 2024-02-21 ENCOUNTER — HOME CARE VISIT (OUTPATIENT)
Dept: HOME HEALTH SERVICES | Facility: HOME HEALTH | Age: 59
End: 2024-02-21
Payer: COMMERCIAL

## 2024-02-21 ENCOUNTER — HOME INFUSION (OUTPATIENT)
Dept: INFUSION THERAPY | Age: 59
End: 2024-02-21
Payer: COMMERCIAL

## 2024-02-21 VITALS — SYSTOLIC BLOOD PRESSURE: 170 MMHG | DIASTOLIC BLOOD PRESSURE: 100 MMHG | HEART RATE: 77 BPM

## 2024-02-21 PROCEDURE — G0151 HHCP-SERV OF PT,EA 15 MIN: HCPCS

## 2024-02-21 PROCEDURE — G0270 MNT SUBS TX FOR CHANGE DX: HCPCS

## 2024-02-21 SDOH — HEALTH STABILITY: MENTAL HEALTH
NUTRITION HISTORY: HX CARB CONTROLLED DIET WITH DX TYPE 2 DM 10 YEARS AGO. PT NOW ON LOW SODIUM, HIGH PRO CARB CONTROLLED DIET

## 2024-02-21 SDOH — HEALTH STABILITY: PHYSICAL HEALTH: EXERCISE ACTIVITIES SETS: 2

## 2024-02-21 SDOH — HEALTH STABILITY: PHYSICAL HEALTH: EXERCISE ACTIVITY: HIP ABDUCTION

## 2024-02-21 SDOH — HEALTH STABILITY: PHYSICAL HEALTH: PHYSICAL EXERCISE: 15

## 2024-02-21 SDOH — HEALTH STABILITY: PHYSICAL HEALTH: EXERCISE ACTIVITY: SQUATS

## 2024-02-21 SDOH — HEALTH STABILITY: PHYSICAL HEALTH: PHYSICAL EXERCISE: STANDING

## 2024-02-21 SDOH — HEALTH STABILITY: PHYSICAL HEALTH: EXERCISE ACTIVITY: HS CURLS

## 2024-02-21 SDOH — HEALTH STABILITY: PHYSICAL HEALTH: EXERCISE TYPE: HEP

## 2024-02-21 ASSESSMENT — ENCOUNTER SYMPTOMS: DENIES PAIN: 1

## 2024-02-21 ASSESSMENT — ACTIVITIES OF DAILY LIVING (ADL): AMBULATION ASSISTANCE ON FLAT SURFACES: 1

## 2024-02-21 NOTE — PROGRESS NOTES
Received orders from Dr. Hickey via Secure Chat  Stop antibiotic as ordered after dose on 2/21. Pt to arrange for Serena catheter removal at [facility]. No further labs needed. Discharge from RX services once line confirmed removed    Addendum 2/26: line removed at [facility] by Dr. Hussein. Pt tolerated well.    Gold copy to intake. Secure Chat RN as FYI    Pt care rep to discharge pt from [facility] and discharge chart

## 2024-02-22 ENCOUNTER — OFFICE VISIT (OUTPATIENT)
Dept: WOUND CARE | Facility: HOSPITAL | Age: 59
End: 2024-02-22
Payer: COMMERCIAL

## 2024-02-22 PROCEDURE — 11042 DBRDMT SUBQ TIS 1ST 20SQCM/<: CPT

## 2024-02-22 PROCEDURE — 99214 OFFICE O/P EST MOD 30 MIN: CPT

## 2024-02-26 ENCOUNTER — TELEPHONE (OUTPATIENT)
Dept: CARE COORDINATION | Facility: CLINIC | Age: 59
End: 2024-02-26
Payer: COMMERCIAL

## 2024-02-26 ENCOUNTER — HOSPITAL ENCOUNTER (OUTPATIENT)
Dept: RADIOLOGY | Facility: HOSPITAL | Age: 59
Discharge: HOME | End: 2024-02-26
Payer: COMMERCIAL

## 2024-02-26 VITALS
SYSTOLIC BLOOD PRESSURE: 157 MMHG | DIASTOLIC BLOOD PRESSURE: 84 MMHG | RESPIRATION RATE: 18 BRPM | HEART RATE: 78 BPM | OXYGEN SATURATION: 100 %

## 2024-02-26 DIAGNOSIS — I10 BENIGN ESSENTIAL HYPERTENSION: ICD-10-CM

## 2024-02-26 DIAGNOSIS — M86.172 ACUTE OSTEOMYELITIS OF LEFT ANKLE OR FOOT (MULTI): ICD-10-CM

## 2024-02-26 PROCEDURE — 36589 REMOVAL TUNNELED CV CATH: CPT

## 2024-02-26 RX ORDER — CLONIDINE HYDROCHLORIDE 0.1 MG/1
0.1 TABLET ORAL 2 TIMES DAILY
Qty: 60 TABLET | Refills: 0 | OUTPATIENT
Start: 2024-02-26

## 2024-02-26 RX ORDER — CLONIDINE HYDROCHLORIDE 0.1 MG/1
0.1 TABLET ORAL 2 TIMES DAILY
Qty: 60 TABLET | Refills: 0 | Status: SHIPPED | OUTPATIENT
Start: 2024-02-26 | End: 2024-02-27 | Stop reason: SDUPTHER

## 2024-02-26 NOTE — NURSING NOTE
Discharge instrutions given to keep dsg on for one day and no suberging in tubs for 5 days.  Pt verbalizes understanding.  Pt disharged via wheelchair.

## 2024-02-26 NOTE — NURSING NOTE
Pt I wheelchair  pt denies any beatrice.  Dr Hussein gave 2ml of lidocaine subcutaneuous to right chest next to catheter site.  After 3 minutes,  removed catheter in its entirety.  Pt tolerated well and he applied finger pressure to puncture site for 2 minute and then applied a 2x2 and a clear tegaderm to right chest.

## 2024-02-26 NOTE — NURSING NOTE
[facility]   Physical Therapy Treatment Note/Progress Note     Name: Ewa Welch  Clinic Number: [id number]    Therapy Diagnosis:   Encounter Diagnoses   Name Primary?    Abnormal gait Yes    Decreased range of motion     Decreased strength        Physician: Mliton Hogan*    Visit Date: 2/26/2024      Therapy Diagnosis:        Encounter Diagnoses   Name Primary?    Tear of right acetabular labrum, initial encounter      Femoroacetabular impingement of right hip      Abnormal gait      Decreased range of motion      Decreased strength          Physician: Milton Hogan*     Physician Orders: PT Eval and Treat   Medical Diagnosis from Referral: S73.191A (ICD-10-CM) - Tear of right acetabular labrum, initial encounter M25.851 (ICD-10-CM) - Femoroacetabular impingement of right hip  Evaluation Date: 12/31/2024  Authorization Period Expiration: 12/31/2023  Plan of Care Expiration: 1/18/2024 to 3/2/2024    Progress Note Due: 3/1/2024  Visit # / Visits authorized:5/20 (+ 24/40 + eval)  FOTO: 5/5     Time In: 8:02am  Time Out: 9:00 am  Total Appointment Time (timed & untimed codes): 55 minutes      Precautions: Standard   DOC 9/15/2023  PROCEDURE PERFORMED:   Right arthroscopic acetabular labral repair  Right arthroscopic osteoplasty of cam lesion  Right arthroscopic capsular plication     She will follow the hip arthroscopy with acetabular labral repair and osteoplasty protocol.  Return to clinic in 2 weeks.  Subjective     Patient reports: Definitely felt it after last session. Been doing a lot more walking.   She was compliant with home exercise program.  Response to previous treatment: sore  Functional change: NA     Pain: 0/10  Location: right hip - only have discomfort if she moves a certain way    Objective      Objective Measures updated at progress report unless specified.     2/1/2024  L/E Strength w/ MicroFET Muscle Marybel Dynamometer Right Left LSI   Hip  Pt is ready for discharge to home with home health care. Pt VS is stable, Right chest Mediport is flushed and capped., intact and in place. Pt is given discharge instruction papers, all questions are answered and concerns are addressed. Pt's  at the bedside with all personal belongings packed. Pt is waiting for transport to take her to private vehicle via wheelchair.    "Flexion/hooklying 7.9 kg  7.2  7.9  AV.7  10.5 kg  7.7  9.8  AVG 9.4  82%   Hip Abduction 10.2 kg  10.9  11.8   AVG:10.9 10.6 kg  11.2  11.9   AV.2 97%   Hip ext 10.1  9.3  11.4  AVG:10.3 13.6  12.7  13.6  AV.3 77%   Hip IR 5.5  7.9  7.3  AV.9 7.1  8.5  9.3  AV.3 83%   Hip ER 7.8  6.5  7.8  AV.4 5.1  8.4  8.5  AV.33 101%         Treatment     Ewa received the treatments listed below:      Ewa received the following manual therapy techniques: Joint mobilizations were applied to the: right hip for 00 minutes, including:  Long axis distraction mobilization and manipulation  Lateral hip distraction - great pain relief - with IR/ER  Gentle circumduction mobilization         Ewa participated in neuromuscular re-education activities to improve: Coordination, Sense, Proprioception, and Posture for 8 minutes. The following activities were included:    1/2 knee B hip hurdles middle level 2x10    Not today:  Hip 90/90 to comfort - 5x10s holds   Single leg bridge on bench B 3x12   Supine bridge with Ham curl green ball 3x15  Plank on elbows hold [id number]  Clamshell at wall GTB 3x10 B  Single leg RDLs 3x10 10#   Single leg balance [id number]   Single leg dynamic balance forward reaches [id number]   Tripod hip ext 3x10 right  Glute bridge [id number] green theraband thighs +10#  Hamstring isometrics [id number]   Quad sets 2x20x5"  Hooklying hip add c blue ball [id number]        Ewa received therapeutic exercises to develop strength, endurance, ROM, flexibility, and posture for 00 minutes including:      Not today:  Lateral Monster walk Yellow theraloop 3 laps  Side lunge/back lunge with slider 4x5    Prone HS Curls 3x10 red theraband  Single leg shuttle 50# 2x15  Step up 12" step  Standing internal rotation/external rotation on stool 3x10  DBL Shuttle Press 2 black bands 2x10  Standing hip Abd c internal rotation 3x10  Standing hip ext 3x10  Hand heel rocking gentle not through pan x10  DKTC w/ towel " "10x 10sec hold   Prone Hip Ext 2x10    Ewa participated in dynamic functional therapeutic activities to improve functional performance for 45 minutes, including:    Dynamic warm up: scoops, hip mobs, quad pulls, side lunges, frankensteinsx2  Deadlift hex bar aingle leg RLD 3x10    Squat hold with Yellow theraloop hip abduction pulse [id number]  Greenlandic Split squat 10# 3x10     Not today:  Hip thrusters in bridge position with BB 90# 3x12  Upright Bike 8min lv 3  hip flexion yellow theraloop in reverse plank positon 4x5  Single leg step up 12" with Blue sports band resistance over shoulder 3x10  Single leg lunge lateral/post/curtsey 15# 3x10   Matrix knee ext double leg extends SINGLE LEG eccentric lower 15# 3x12  Leg Press 25# single leg 3x10  Alter G Treadmill - 5min walk, 1min jog, 3min walk and 1min jog x3 - 80% BW support  Assessment made above  Double leg hop in place 3x30  Double leg hop Forward/backward 3x30  Double leg hop lat 3x30  Standing double limb 50% tolerance  TRX single leg squat to bench double leg stand 3x12       Home Exercises and Patient Education Provided     Education provided:   - Pt educated on standing lateral hip distraction with band    Written Home Exercises Provided: Yes, adding hopping double leg to home program  Exercises were reviewed and Ewa was able to demonstrate them prior to the end of the session.  Ewa demonstrated good  understanding of the education provided.      See EMR under Patient Instructions for exercises provided 9/18/2023.     Assessment     Ewa presents to PT with reports of soreness fter last visit. She had no pain during hip mobilization with control but then started to have ant hip pain during single leg activities. When cued on activation of glute med, pain reduced but did remain. Pt encouraged to perform wall hip hike at home prior to performing strength exercises.     Plan of care discussed with patient: Continue with previous HEP  Patient's " spiritual, cultural and educational needs considered and patient is agreeable to the plan of care and goals as stated below:      Anticipated Barriers for therapy: none    Goals:   Short Term Goals (8 Weeks):  1. Pt will be compliant with initial HEP to supplement PT in restoring pain free function.  2. Pt will reduce worst pain to 4/10 in order to progress functional deficits  3. Pt will ambulate 1mile without pain in order to return to previous level of function  4. Pt will improve hip flex strength to 4/5 in order to improve functional mobility      Long Term Goals (16 Weeks):  1. Pt will improve FOTO score to </= 73% limited to decrease perceived limitation with mobility.   2. Pt will reduce worst pain to 1/10 in order to progress functional deficits  3. Pt will have hip strength greater than 90% of contralateral lower extremity in on order to return to previous level of function  4. Pt will have full hip range of motion compared to contralateral lower extremity in order to return to previous level of function.        Plan     Continue with plan of care as indicated    Audrey Joseph, PT

## 2024-02-27 ENCOUNTER — HOME CARE VISIT (OUTPATIENT)
Dept: HOME HEALTH SERVICES | Facility: HOME HEALTH | Age: 59
End: 2024-02-27
Payer: COMMERCIAL

## 2024-02-27 DIAGNOSIS — E78.2 MIXED HYPERLIPIDEMIA: ICD-10-CM

## 2024-02-27 DIAGNOSIS — Z00.00 ROUTINE GENERAL MEDICAL EXAMINATION AT A HEALTH CARE FACILITY: ICD-10-CM

## 2024-02-27 DIAGNOSIS — Z79.4 TYPE 2 DIABETES MELLITUS WITH RIGHT EYE AFFECTED BY PROLIFERATIVE RETINOPATHY AND MACULAR EDEMA, WITH LONG-TERM CURRENT USE OF INSULIN (MULTI): Primary | ICD-10-CM

## 2024-02-27 DIAGNOSIS — E11.3511 TYPE 2 DIABETES MELLITUS WITH RIGHT EYE AFFECTED BY PROLIFERATIVE RETINOPATHY AND MACULAR EDEMA, WITH LONG-TERM CURRENT USE OF INSULIN (MULTI): Primary | ICD-10-CM

## 2024-02-27 DIAGNOSIS — I10 BENIGN ESSENTIAL HYPERTENSION: ICD-10-CM

## 2024-02-27 PROCEDURE — G0299 HHS/HOSPICE OF RN EA 15 MIN: HCPCS

## 2024-02-27 PROCEDURE — G0152 HHCP-SERV OF OT,EA 15 MIN: HCPCS

## 2024-02-27 RX ORDER — BLOOD-GLUCOSE METER
EACH MISCELLANEOUS
Qty: 200 EACH | Refills: 0 | Status: SHIPPED | OUTPATIENT
Start: 2024-02-27 | End: 2024-03-25 | Stop reason: SDUPTHER

## 2024-02-27 RX ORDER — NAPROXEN SODIUM 220 MG/1
81 TABLET, FILM COATED ORAL
Qty: 90 TABLET | Refills: 0 | Status: SHIPPED | OUTPATIENT
Start: 2024-02-27

## 2024-02-27 RX ORDER — INSULIN LISPRO 100 [IU]/ML
10 INJECTION, SOLUTION INTRAVENOUS; SUBCUTANEOUS
Qty: 10 ML | Refills: 0 | Status: SHIPPED | OUTPATIENT
Start: 2024-02-27

## 2024-02-27 RX ORDER — ACETAMINOPHEN 500 MG
2000 TABLET ORAL
Qty: 90 CAPSULE | Refills: 0 | Status: SHIPPED | OUTPATIENT
Start: 2024-02-27

## 2024-02-27 RX ORDER — AMLODIPINE BESYLATE 10 MG/1
10 TABLET ORAL DAILY
Qty: 90 TABLET | Refills: 0 | Status: SHIPPED | OUTPATIENT
Start: 2024-02-27 | End: 2024-03-25 | Stop reason: SDUPTHER

## 2024-02-27 RX ORDER — ATORVASTATIN CALCIUM 40 MG/1
40 TABLET, FILM COATED ORAL DAILY
Qty: 90 TABLET | Refills: 0 | Status: SHIPPED | OUTPATIENT
Start: 2024-02-27 | End: 2024-03-02

## 2024-02-27 RX ORDER — LANCETS 30 GAUGE
EACH MISCELLANEOUS
Qty: 1 EACH | Refills: 0 | Status: SHIPPED | OUTPATIENT
Start: 2024-02-27

## 2024-02-27 RX ORDER — LANCETS 33 GAUGE
EACH MISCELLANEOUS
Qty: 200 EACH | Refills: 0 | Status: SHIPPED | OUTPATIENT
Start: 2024-02-27

## 2024-02-27 RX ORDER — CLONIDINE HYDROCHLORIDE 0.1 MG/1
0.1 TABLET ORAL 2 TIMES DAILY
Qty: 180 TABLET | Refills: 0 | Status: SHIPPED | OUTPATIENT
Start: 2024-02-27

## 2024-02-27 SDOH — HEALTH STABILITY: MENTAL HEALTH: SMOKING IN HOME: 0

## 2024-02-27 SDOH — ECONOMIC STABILITY: HOUSING INSECURITY: EVIDENCE OF SMOKING MATERIAL: 0

## 2024-02-27 ASSESSMENT — ENCOUNTER SYMPTOMS
PERSON REPORTING PAIN: PATIENT
DENIES PAIN: 1
DENIES PAIN: 1
PERSON REPORTING PAIN: PATIENT
APPETITE LEVEL: GOOD
SHORTNESS OF BREATH: 1
LAST BOWEL MOVEMENT: 66896

## 2024-02-28 ENCOUNTER — HOME CARE VISIT (OUTPATIENT)
Dept: HOME HEALTH SERVICES | Facility: HOME HEALTH | Age: 59
End: 2024-02-28
Payer: COMMERCIAL

## 2024-02-28 ENCOUNTER — TELEPHONE (OUTPATIENT)
Dept: CARE COORDINATION | Facility: CLINIC | Age: 59
End: 2024-02-28
Payer: COMMERCIAL

## 2024-02-28 VITALS — HEART RATE: 74 BPM | OXYGEN SATURATION: 98 %

## 2024-02-28 PROCEDURE — G0151 HHCP-SERV OF PT,EA 15 MIN: HCPCS

## 2024-02-28 SDOH — HEALTH STABILITY: PHYSICAL HEALTH: PHYSICAL EXERCISE: SITTING

## 2024-02-28 SDOH — ECONOMIC STABILITY: HOUSING INSECURITY: EVIDENCE OF SMOKING MATERIAL: 1

## 2024-02-28 SDOH — HEALTH STABILITY: PHYSICAL HEALTH: EXERCISE ACTIVITIES SETS: 3

## 2024-02-28 SDOH — HEALTH STABILITY: PHYSICAL HEALTH: EXERCISE ACTIVITY: SEATED HEP

## 2024-02-28 SDOH — ECONOMIC STABILITY: HOUSING INSECURITY: OPEN FLAME PRESENT: 1

## 2024-02-28 SDOH — HEALTH STABILITY: MENTAL HEALTH: SMOKING IN HOME: 0

## 2024-02-28 SDOH — HEALTH STABILITY: PHYSICAL HEALTH: PHYSICAL EXERCISE: 10

## 2024-02-28 SDOH — HEALTH STABILITY: MENTAL HEALTH: SMOKING IN HOME: 1

## 2024-02-28 SDOH — HEALTH STABILITY: PHYSICAL HEALTH: EXERCISE TYPE: SEATED HEP

## 2024-02-28 SDOH — HEALTH STABILITY: PHYSICAL HEALTH: EXERCISE COMMENTS: SEATED HEP INCLUDES ANKLE PUMPS, LAQS, HIP FLEXION, AND HIP ABDUCTION

## 2024-02-28 SDOH — ECONOMIC STABILITY: HOUSING INSECURITY: EVIDENCE OF SMOKING MATERIAL: 0

## 2024-02-28 ASSESSMENT — ACTIVITIES OF DAILY LIVING (ADL)
TOILETING: 1
PREPARING MEALS: DEPENDENT
LAUNDRY: DEPENDENT
ORAL_CARE_CURRENT_FUNCTION: INDEPENDENT
TOILETING: INDEPENDENT
DRESSING_LB_CURRENT_FUNCTION: INDEPENDENT
TELEPHONE USE ASSESSED: 1
LAUNDRY ASSESSED: 1
AMBULATION ASSISTANCE: STAND BY ASSIST
BATHING_CURRENT_FUNCTION: STAND BY ASSIST
PHYSICAL TRANSFERS ASSESSED: 1
SHOPPING: DEPENDENT
AMBULATION ASSISTANCE: ONE PERSON
SHOPPING ASSESSED: 1
AMBULATION ASSISTANCE ON FLAT SURFACES: 1
ORAL_CARE_ASSESSED: 1
FEEDING: INDEPENDENT
CURRENT_FUNCTION: CONTACT GUARD ASSIST
LIGHT HOUSEKEEPING: DEPENDENT
TRANSPORTATION ASSESSED: 1
FEEDING ASSESSED: 1
DRESSING_UB_CURRENT_FUNCTION: INDEPENDENT
GROOMING_CURRENT_FUNCTION: INDEPENDENT
BATHING ASSESSED: 1
CURRENT_FUNCTION: INDEPENDENT
AMBULATION ASSISTANCE: 1
PHYSICAL TRANSFERS ASSESSED: 1
GROOMING ASSESSED: 1
USING THE TELPHONE: INDEPENDENT
HOUSEKEEPING ASSESSED: 1
TRANSPORTATION: DEPENDENT

## 2024-02-28 ASSESSMENT — ENCOUNTER SYMPTOMS
ARTHRALGIAS: 1
MUSCLE WEAKNESS: 1
OCCASIONAL FEELINGS OF UNSTEADINESS: 1
DEPRESSION: 0
LIMITED RANGE OF MOTION: 1
DENIES PAIN: 1
LOSS OF SENSATION IN FEET: 1

## 2024-02-28 NOTE — PROGRESS NOTES
"Attempted to return call regarding message to reschedule  outpatient DSME appointment\"  Mailbox full message received; sent\" SMF text\" at prompt.   "

## 2024-02-29 ENCOUNTER — APPOINTMENT (OUTPATIENT)
Dept: PHARMACY | Facility: HOSPITAL | Age: 59
End: 2024-02-29
Payer: COMMERCIAL

## 2024-02-29 ENCOUNTER — TELEMEDICINE (OUTPATIENT)
Dept: PHARMACY | Facility: HOSPITAL | Age: 59
End: 2024-02-29
Payer: COMMERCIAL

## 2024-02-29 ENCOUNTER — OFFICE VISIT (OUTPATIENT)
Dept: WOUND CARE | Facility: HOSPITAL | Age: 59
End: 2024-02-29
Payer: COMMERCIAL

## 2024-02-29 DIAGNOSIS — E11.3511 TYPE 2 DIABETES MELLITUS WITH RIGHT EYE AFFECTED BY PROLIFERATIVE RETINOPATHY AND MACULAR EDEMA, WITH LONG-TERM CURRENT USE OF INSULIN (MULTI): ICD-10-CM

## 2024-02-29 DIAGNOSIS — Z79.4 TYPE 2 DIABETES MELLITUS WITH RIGHT EYE AFFECTED BY PROLIFERATIVE RETINOPATHY AND MACULAR EDEMA, WITH LONG-TERM CURRENT USE OF INSULIN (MULTI): ICD-10-CM

## 2024-02-29 PROCEDURE — 11042 DBRDMT SUBQ TIS 1ST 20SQCM/<: CPT

## 2024-02-29 NOTE — ASSESSMENT & PLAN NOTE
Diabetes: Uncontrolled with last A1c 13.1% on 1/24/24. Patient recently hospitalized again after presenting to ED with AMS and hypoglycemia, thought to be related to bacteremia. Patient reports she is feeling well today. No hypoglycemia since discharge. Current regimen includes Lantus 12 units once daily (15 units for FBG >150), Humalog TID with meals per sliding scale. Home BG readings reported as random -200's. Patient has not yet picked up new Remington 3 sensors and reader. Due to limited BG readings reported near goal, plan to continue current regimen and follow-up in 3-4 weeks.  Continue taking Lantus 12 units once daily (15 units for FBG >150), Humalog TID with meals per sliding scale.  Continue to monitor and record BG daily. Monitor with Remington 3 when able.  Continue to focus on cooking healthy meals at home and increasing activity as able.

## 2024-02-29 NOTE — PROGRESS NOTES
[facility] Pharmacist Visit - Diabetes Management  Referring Provider: Mone Aquino MD    Subjective   Nuris Squires is a 58 y.o. female who presents for No chief complaint on file..    HPI  PMH significant for T2DM, HTN, HLD, PAD, DFI s/p toe amputation, sickle cell.  Patient has no known history of medullary thyroid cancer, pancreatitis, or complicated UTI.  Diagnosed 2009. Known DM complications include retinopathy, chronic kidney disease, and toe amputations .  Special needs/barriers to therapy: None identified    Patient recently hospitalized again after presenting to ED with AMS and hypoglycemia, thought to be related to bacteremia. Patient reports she is feeling well today. No hypoglycemia since discharge.    Diabetes Management  Current Medications: Lantus 12 units once daily (15 units for FBG >150), Humalog TID with meals per sliding scale  Previous Medications: Jardiance (VENKAT), metformin    Adherence: Takes medication as directed and reports no missed doses  Adverse Effects: None    Risk Reducing Meds  On ACEi/ARB: No (currently held)  On SGLT2i: No   On GLP1-RA: No  On Statin: Yes     Glucose Monitoring  Glucometer/CGM Type: Onetouch Ultra 2  Remington 3 w/ reader has been order, but patient has not picked up yet.    Previous home BG readings: (2/1/24) BG range 's  Current home BG readings: Random 's-200's    Hypoglycemia: Patient denies signs and symptoms and No BG readings < 80 mg/dL  Hyperglycemia: Denies signs and symptoms    Diabetic Eye Exam:  Following with ophthalmology  Monofilament Foot Exam:  Following with podiatry    Lifestyle  Diet: Trying to eat less fried foods. Has been cooking more at home in her crock-pot.  Physical Activity: Working with PT/OT, walking around house      Secondary Prevention  ASCVD Risk:   The 10-year ASCVD risk score (Sunshine GÓMEZ, et al., 2019) is: 28.1%    Values used to calculate the score:      Age: 58 years      Sex: Female      Is Non- :  Yes      Diabetic: Yes      Tobacco smoker: No      Systolic Blood Pressure: 157 mmHg      Is BP treated: Yes      HDL Cholesterol: 38.5 mg/dL      Total Cholesterol: 174 mg/dL  On Statin?: Yes, atorvastatin 40mg daily    Immunizations Needed: Annual Flu, Prevnar, Shingrix, and Tdap  Tobacco Use: non-smoker      Objective   Vitals  BP Readings from Last 2 Encounters:   02/26/24 157/84   02/21/24 (!) 170/100     BMI Readings from Last 1 Encounters:   02/20/24 41.15 kg/m²      Labs  A1C  Lab Results   Component Value Date    HGBA1C 13.1 (H) 01/24/2024    HGBA1C 12.1 (A) 09/14/2023    HGBA1C 14.0 (A) 05/08/2023     BMP  Lab Results   Component Value Date    CALCIUM 8.8 02/10/2024     02/10/2024    K 4.1 02/10/2024    CO2 27 02/10/2024     02/10/2024    BUN 18 02/10/2024    CREATININE 1.90 (H) 02/10/2024    GFRF 44 (A) 08/13/2023     LFTs  Lab Results   Component Value Date    ALT 22 02/03/2024    AST 21 02/03/2024    ALKPHOS 91 02/03/2024    BILITOT 0.4 02/03/2024     FLP  Lab Results   Component Value Date    TRIG 189 (H) 05/08/2023    CHOL 174 05/08/2023    LDLF 98 05/08/2023    HDL 38.5 (A) 05/08/2023     Urine Microalbumin  Lab Results   Component Value Date    MICROALBCREA 998.6 (H) 08/24/2022     Vitamin B12  Lab Results   Component Value Date    [id number] 582 01/18/2024         Assessment/Plan   Problem List Items Addressed This Visit       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 Patient recently hospitalized again after presenting to ED with AMS and hypoglycemia, thought to be related to bacteremia. Patient reports she is feeling well today. No hypoglycemia since discharge. Current regimen includes Lantus 12 units once daily (15 units for FBG >150), Humalog TID with meals per sliding scale. Home BG readings reported as random BG  150-200's. Patient has not yet picked up new Remington 3 sensors and reader. Due to limited BG readings reported near goal, plan to continue current regimen and follow-up in 3-4 weeks.  Continue taking Lantus 12 units once daily (15 units for FBG >150), Humalog TID with meals per sliding scale.  Continue to monitor and record BG daily. Monitor with Remington 3 when able.  Continue to focus on cooking healthy meals at home and increasing activity as able.         Relevant Orders    Follow Up In Advanced Primary Care - Pharmacy     Patient Education:  Counseled patient on relevant MOA, expectations, side effects, duration of therapy, contraindications, administration, and monitoring parameters.  All questions and concerns addressed. Contact pharmacist with any further questions or concerns prior to next appointment.  Reviewed BG goals: Fasting BG goal , Postprandial BG goal <180 mg/dL, A1C goal <7.5%    Clinical Pharmacist follow-up: 3/28/24 at 10:00am, Telehealth visit  Next PCP appointment: 3/19/24    Amanda Joe, PharmD  Clinical Pharmacist  02/29/24    Continue all meds under the continuation of care with the referring provider and clinical pharmacy team.  Verbal consent to manage patient's drug therapy was obtained from the patient. They were informed they may decline to participate or withdraw from participation in pharmacy services at any time.

## 2024-03-01 ENCOUNTER — HOME CARE VISIT (OUTPATIENT)
Dept: HOME HEALTH SERVICES | Facility: HOME HEALTH | Age: 59
End: 2024-03-01
Payer: COMMERCIAL

## 2024-03-01 ENCOUNTER — PATIENT OUTREACH (OUTPATIENT)
Dept: PRIMARY CARE | Facility: CLINIC | Age: 59
End: 2024-03-01
Payer: COMMERCIAL

## 2024-03-01 NOTE — PROGRESS NOTES
Call regarding appt. with Dr. Aquino after hospitalization. At time of outreach call, the patient feels as if her condition is improving. Her blood sugars have been stable since her insulin was changed at the appt. She has been following up with the wound care center for her foot and her incisions are healing well. Verified med refills sent to pharmacy per patient request.

## 2024-03-04 ENCOUNTER — TELEPHONE (OUTPATIENT)
Dept: CARE COORDINATION | Facility: CLINIC | Age: 59
End: 2024-03-04
Payer: COMMERCIAL

## 2024-03-04 NOTE — PROGRESS NOTES
3rd Call Attempt:  Left VM requesting return call to reschedule outpatient DSME if interested.  Name and contact info provided.

## 2024-03-05 ENCOUNTER — HOME CARE VISIT (OUTPATIENT)
Dept: HOME HEALTH SERVICES | Facility: HOME HEALTH | Age: 59
End: 2024-03-05
Payer: COMMERCIAL

## 2024-03-05 VITALS — OXYGEN SATURATION: 96 % | RESPIRATION RATE: 20 BRPM

## 2024-03-05 PROCEDURE — G0151 HHCP-SERV OF PT,EA 15 MIN: HCPCS

## 2024-03-05 SDOH — HEALTH STABILITY: PHYSICAL HEALTH: PHYSICAL EXERCISE: STANDING

## 2024-03-05 SDOH — HEALTH STABILITY: PHYSICAL HEALTH: EXERCISE TYPE: STANDING HEP

## 2024-03-05 SDOH — HEALTH STABILITY: PHYSICAL HEALTH: EXERCISE ACTIVITY: DYNAMIC STANDING HEP

## 2024-03-05 SDOH — ECONOMIC STABILITY: HOUSING INSECURITY: HOME SAFETY: DISCUSSED AND REVIEWED OXYGEN SAFETY PRECAUTIONS IN THE HOME

## 2024-03-05 SDOH — HEALTH STABILITY: PHYSICAL HEALTH: PHYSICAL EXERCISE: 15

## 2024-03-05 SDOH — HEALTH STABILITY: PHYSICAL HEALTH: EXERCISE ACTIVITY: STANDING HEP

## 2024-03-05 SDOH — HEALTH STABILITY: PHYSICAL HEALTH
EXERCISE COMMENTS: STANDING HEP INCLUDES: CALF RAISES, KNEE FLEXION, HIP FLEXION, MARCHING, HIP ABDUCTION, HIP EXTENSION, AND MINI SQUATS  BALANCE PROGRAM ACTIVITIES INCLUDE:  SIDESTEPPING, FORWARD AND RETRO MARCHING, BACKWARD WALKING, TANDEM STANCE FOOT POSITIONS WITH

## 2024-03-05 SDOH — ECONOMIC STABILITY: HOUSING INSECURITY: OPEN FLAME PRESENT: 1

## 2024-03-05 SDOH — HEALTH STABILITY: PHYSICAL HEALTH: EXERCISE ACTIVITIES SETS: 1

## 2024-03-05 SDOH — ECONOMIC STABILITY: HOUSING INSECURITY: EVIDENCE OF SMOKING MATERIAL: 0

## 2024-03-05 SDOH — HEALTH STABILITY: MENTAL HEALTH: SMOKING IN HOME: 0

## 2024-03-05 SDOH — HEALTH STABILITY: PHYSICAL HEALTH: EXERCISE COMMENTS: ALTERNATING TRUNK ROTATIONS WITH REACHING, SINGLE LEG STANCE BALANCE ACTIVITIES

## 2024-03-05 ASSESSMENT — ACTIVITIES OF DAILY LIVING (ADL)
AMBULATION ASSISTANCE: 1
AMBULATION ASSISTANCE: ONE PERSON
PHYSICAL TRANSFERS ASSESSED: 1
CURRENT_FUNCTION: INDEPENDENT
AMBULATION ASSISTANCE ON FLAT SURFACES: 1
AMBULATION ASSISTANCE: STAND BY ASSIST

## 2024-03-05 ASSESSMENT — ENCOUNTER SYMPTOMS
DEPRESSION: 0
LIMITED RANGE OF MOTION: 1
ARTHRALGIAS: 1
MUSCLE WEAKNESS: 1
LOSS OF SENSATION IN FEET: 1
OCCASIONAL FEELINGS OF UNSTEADINESS: 1
PAIN: NO PAIN REPORTED
DENIES PAIN: 1

## 2024-03-06 LAB
ACID FAST STN SPEC: NORMAL
ACID FAST STN SPEC: NORMAL
MYCOBACTERIUM SPEC CULT: NORMAL
MYCOBACTERIUM SPEC CULT: NORMAL

## 2024-03-07 ENCOUNTER — OFFICE VISIT (OUTPATIENT)
Dept: WOUND CARE | Facility: HOSPITAL | Age: 59
End: 2024-03-07
Payer: COMMERCIAL

## 2024-03-07 ENCOUNTER — HOME CARE VISIT (OUTPATIENT)
Dept: HOME HEALTH SERVICES | Facility: HOME HEALTH | Age: 59
End: 2024-03-07
Payer: COMMERCIAL

## 2024-03-07 PROCEDURE — 11042 DBRDMT SUBQ TIS 1ST 20SQCM/<: CPT

## 2024-03-08 ENCOUNTER — HOME CARE VISIT (OUTPATIENT)
Dept: HOME HEALTH SERVICES | Facility: HOME HEALTH | Age: 59
End: 2024-03-08
Payer: COMMERCIAL

## 2024-03-08 PROCEDURE — G0157 HHC PT ASSISTANT EA 15: HCPCS

## 2024-03-08 SDOH — HEALTH STABILITY: PHYSICAL HEALTH: EXERCISE COMMENTS: TH ALTERNATING TRUNK ROTATIONS WITH REACHING, SINGLE LEG STANCE BALANCE ACTIVITIES

## 2024-03-08 SDOH — ECONOMIC STABILITY: HOUSING INSECURITY: EVIDENCE OF SMOKING MATERIAL: 0

## 2024-03-08 SDOH — HEALTH STABILITY: MENTAL HEALTH: SMOKING IN HOME: 0

## 2024-03-08 SDOH — HEALTH STABILITY: PHYSICAL HEALTH: EXERCISE TYPE: BIL LE STANDING THER EX

## 2024-03-08 SDOH — HEALTH STABILITY: PHYSICAL HEALTH
EXERCISE COMMENTS: STANDING HEP INCLUDES: CALF RAISES, KNEE FLEXION, HIP FLEXION, MARCHING, HIP ABDUCTION, HIP EXTENSION, AND MINI SQUATS   BALANCE PROGRAM ACTIVITIES INCLUDE:   SIDESTEPPING, FORWARD AND RETRO MARCHING, BACKWARD WALKING, TANDEM STANCE FOOT POSITIONS WI

## 2024-03-08 ASSESSMENT — ACTIVITIES OF DAILY LIVING (ADL): AMBULATION ASSISTANCE ON FLAT SURFACES: 1

## 2024-03-08 ASSESSMENT — ENCOUNTER SYMPTOMS
PAIN LOCATION: RIGHT FOOT
HIGHEST PAIN SEVERITY IN PAST 24 HOURS: 5/10
PERSON REPORTING PAIN: PATIENT
PAIN LOCATION - PAIN SEVERITY: 5/10
LOWEST PAIN SEVERITY IN PAST 24 HOURS: 3/10
PAIN: 1

## 2024-03-11 ENCOUNTER — HOME CARE VISIT (OUTPATIENT)
Dept: HOME HEALTH SERVICES | Facility: HOME HEALTH | Age: 59
End: 2024-03-11
Payer: COMMERCIAL

## 2024-03-11 VITALS — HEART RATE: 66 BPM | OXYGEN SATURATION: 97 %

## 2024-03-11 VITALS — OXYGEN SATURATION: 96 %

## 2024-03-11 PROCEDURE — G0180 MD CERTIFICATION HHA PATIENT: HCPCS | Performed by: FAMILY MEDICINE

## 2024-03-11 PROCEDURE — G0151 HHCP-SERV OF PT,EA 15 MIN: HCPCS

## 2024-03-11 PROCEDURE — G0152 HHCP-SERV OF OT,EA 15 MIN: HCPCS

## 2024-03-11 SDOH — ECONOMIC STABILITY: HOUSING INSECURITY: EVIDENCE OF SMOKING MATERIAL: 1

## 2024-03-11 SDOH — HEALTH STABILITY: PHYSICAL HEALTH: EXERCISE ACTIVITY: DYNAMIC STANDING HEP

## 2024-03-11 SDOH — HEALTH STABILITY: PHYSICAL HEALTH: EXERCISE ACTIVITY: STANDING HEP

## 2024-03-11 SDOH — HEALTH STABILITY: PHYSICAL HEALTH: PHYSICAL EXERCISE: STANDING

## 2024-03-11 SDOH — HEALTH STABILITY: PHYSICAL HEALTH: PHYSICAL EXERCISE: 10

## 2024-03-11 SDOH — HEALTH STABILITY: PHYSICAL HEALTH: EXERCISE ACTIVITIES SETS: 2

## 2024-03-11 SDOH — HEALTH STABILITY: PHYSICAL HEALTH: EXERCISE COMMENTS: ALTERNATING TRUNK ROTATIONS WITH REACHING, SINGLE LEG STANCE BALANCE ACTIVITIES

## 2024-03-11 SDOH — HEALTH STABILITY: MENTAL HEALTH: SMOKING IN HOME: 1

## 2024-03-11 SDOH — HEALTH STABILITY: PHYSICAL HEALTH: EXERCISE TYPE: STANDING HEP

## 2024-03-11 SDOH — ECONOMIC STABILITY: HOUSING INSECURITY: HOME SAFETY: DISCUSSED AND REVIEWED OXYGEN SAFETY PRECAUTIONS IN THE HOME

## 2024-03-11 SDOH — ECONOMIC STABILITY: HOUSING INSECURITY: EVIDENCE OF SMOKING MATERIAL: 0

## 2024-03-11 SDOH — ECONOMIC STABILITY: HOUSING INSECURITY: OPEN FLAME PRESENT: 1

## 2024-03-11 SDOH — HEALTH STABILITY: MENTAL HEALTH: SMOKING IN HOME: 0

## 2024-03-11 ASSESSMENT — ACTIVITIES OF DAILY LIVING (ADL)
TELEPHONE USE ASSESSED: 1
HOUSEKEEPING ASSESSED: 1
CURRENT_FUNCTION: INDEPENDENT
FEEDING ASSESSED: 1
USING THE TELPHONE: INDEPENDENT
TRANSPORTATION: DEPENDENT
BATHING_CURRENT_FUNCTION: INDEPENDENT
SHOPPING: DEPENDENT
LIGHT HOUSEKEEPING: DEPENDENT
ORAL_CARE_CURRENT_FUNCTION: INDEPENDENT
LAUNDRY ASSESSED: 1
FEEDING: INDEPENDENT
TOILETING: 1
PREPARING MEALS: NEEDS ASSISTANCE
DRESSING_UB_CURRENT_FUNCTION: INDEPENDENT
TRANSPORTATION ASSESSED: 1
SHOPPING ASSESSED: 1
AMBULATION ASSISTANCE ON FLAT SURFACES: 1
AMBULATION ASSISTANCE: ONE PERSON
PHYSICAL TRANSFERS ASSESSED: 1
DRESSING_LB_CURRENT_FUNCTION: INDEPENDENT
TOILETING: INDEPENDENT
AMBULATION ASSISTANCE: 1
AMBULATION_DISTANCE/DURATION_TOLERATED: 100 FEET WITH SUPERVISION AND NO DEVICE
BATHING ASSESSED: 1
LAUNDRY: DEPENDENT
PHYSICAL TRANSFERS ASSESSED: 1
ORAL_CARE_ASSESSED: 1
CURRENT_FUNCTION: INDEPENDENT
AMBULATION ASSISTANCE: STAND BY ASSIST

## 2024-03-11 ASSESSMENT — ENCOUNTER SYMPTOMS
ARTHRALGIAS: 1
LOSS OF SENSATION IN FEET: 1
LIMITED RANGE OF MOTION: 1
DEPRESSION: 0
MUSCLE WEAKNESS: 1
PAIN: NO PAIN REPORTED
PERSON REPORTING PAIN: PATIENT
DENIES PAIN: 1
OCCASIONAL FEELINGS OF UNSTEADINESS: 1
DENIES PAIN: 1

## 2024-03-13 ENCOUNTER — HOME CARE VISIT (OUTPATIENT)
Dept: HOME HEALTH SERVICES | Facility: HOME HEALTH | Age: 59
End: 2024-03-13
Payer: COMMERCIAL

## 2024-03-13 PROCEDURE — G0321 HH/HSPC RD PHONE VISIT: HCPCS

## 2024-03-14 ENCOUNTER — OFFICE VISIT (OUTPATIENT)
Dept: WOUND CARE | Facility: HOSPITAL | Age: 59
End: 2024-03-14
Payer: COMMERCIAL

## 2024-03-14 ENCOUNTER — APPOINTMENT (OUTPATIENT)
Dept: PULMONOLOGY | Facility: CLINIC | Age: 59
End: 2024-03-14
Payer: COMMERCIAL

## 2024-03-14 PROCEDURE — 99213 OFFICE O/P EST LOW 20 MIN: CPT

## 2024-03-15 ENCOUNTER — HOME CARE VISIT (OUTPATIENT)
Dept: HOME HEALTH SERVICES | Facility: HOME HEALTH | Age: 59
End: 2024-03-15
Payer: COMMERCIAL

## 2024-03-15 VITALS — OXYGEN SATURATION: 97 %

## 2024-03-15 PROCEDURE — G0151 HHCP-SERV OF PT,EA 15 MIN: HCPCS

## 2024-03-15 SDOH — ECONOMIC STABILITY: HOUSING INSECURITY: OPEN FLAME PRESENT: 1

## 2024-03-15 SDOH — HEALTH STABILITY: PHYSICAL HEALTH: EXERCISE ACTIVITIES SETS: 2

## 2024-03-15 SDOH — ECONOMIC STABILITY: HOUSING INSECURITY: EVIDENCE OF SMOKING MATERIAL: 0

## 2024-03-15 SDOH — HEALTH STABILITY: PHYSICAL HEALTH: PHYSICAL EXERCISE: 15

## 2024-03-15 SDOH — HEALTH STABILITY: PHYSICAL HEALTH: PHYSICAL EXERCISE: STANDING

## 2024-03-15 SDOH — HEALTH STABILITY: MENTAL HEALTH: SMOKING IN HOME: 0

## 2024-03-15 SDOH — HEALTH STABILITY: PHYSICAL HEALTH
EXERCISE COMMENTS: STANDING HEP INCLUDES: CALF RAISES, KNEE FLEXION, HIP FLEXION, MARCHING, HIP ABDUCTION, HIP EXTENSION, AND MINI SQUATS

## 2024-03-15 SDOH — HEALTH STABILITY: PHYSICAL HEALTH: EXERCISE ACTIVITY: STANDING HEP

## 2024-03-15 SDOH — HEALTH STABILITY: PHYSICAL HEALTH: EXERCISE TYPE: STANDING HEP

## 2024-03-15 SDOH — ECONOMIC STABILITY: HOUSING INSECURITY: HOME SAFETY: .DISCUSSED HOME SAFETY AND OXYGEN PRECAUTIONS

## 2024-03-15 ASSESSMENT — BALANCE ASSESSMENTS
PICK UP OBJECT FROM THE FLOOR FROM A STANDING POSITION: 3 - ABLE TO PICK UP SLIPPER BUT NEEDS SUPERVISION
LONG VERSION TOTAL SCORE (MAX 56): 46
SITTING TO STANDING: 3 - ABLE TO STAND INDEPENDENTLY USING HANDS
STANDING TO SITTING: 4
TURN 360 DEGREES: 3
STANDING UNSUPPORTED WITH FEET TOGETHER: 4 - ABLE TO PLACE FEET TOGETHER INDEPENDENTLY AND STAND 1 MINUTE SAFELY
STANDING ON ONE LEG: 2 - ABLE TO LIFT LEG INDEPENDENTLY AND HOLD >= 3 SECONDS
TURN 360 DEGREES: 3 - ABLE TO TURN 360 DEGREES SAFELY ONE SIDE ONLY 4 SECONDS OR LESS
STANDING UNSUPPORTED WITH EYES CLOSED: 3 - ABLE TO STAND 10 SECONDS WITH SUPERVISION
STANDING UNSUPPORTED ONE FOOT IN FRONT: 2 - ABLE TO TAKE SMALL STEP INDEPENDENTLY AND HOLD 30 SECONDS
STANDING UNSUPPORTED WITH EYES CLOSED: 3
STANDING UNSUPPORTED ONE FOOT IN FRONT: 2
REACHING FORWARD WITH OUTSTRETCHED ARM WHILE STANDING: 3 - CAN REACH FORWARD 12 CM (5 INCHES)
STANDING UNSUPPORTED WITH FEET TOGETHER: 4
TRANSFERS: 4
SITTING TO STANDING: 3
TRANSFERS: 4 - ABLE TO TRANSFER SAFELY WITH MINOR USE OF HANDS
STANDING ON ONE LEG: 2
STANDING UNSUPPORTED: 4
STANDING TO SITTING: 4 - SITS SAFELY WITH MINIMAL USE OF HANDS

## 2024-03-15 ASSESSMENT — ACTIVITIES OF DAILY LIVING (ADL)
CURRENT_FUNCTION: INDEPENDENT
AMBULATION ASSISTANCE ON FLAT SURFACES: 1
AMBULATION ASSISTANCE: INDEPENDENT
HOME_HEALTH_OASIS: 00
AMBULATION ASSISTANCE: 1
OASIS_M1830: 01
PHYSICAL TRANSFERS ASSESSED: 1

## 2024-03-15 ASSESSMENT — ENCOUNTER SYMPTOMS
LOSS OF SENSATION IN FEET: 1
DEPRESSION: 0
OCCASIONAL FEELINGS OF UNSTEADINESS: 1
MUSCLE WEAKNESS: 1
PAIN: NO PAIN REPORTED
DENIES PAIN: 1
ARTHRALGIAS: 1
LIMITED RANGE OF MOTION: 1

## 2024-03-19 ENCOUNTER — APPOINTMENT (OUTPATIENT)
Dept: PRIMARY CARE | Facility: CLINIC | Age: 59
End: 2024-03-19
Payer: COMMERCIAL

## 2024-03-20 ENCOUNTER — APPOINTMENT (OUTPATIENT)
Dept: PRIMARY CARE | Facility: CLINIC | Age: 59
End: 2024-03-20
Payer: COMMERCIAL

## 2024-03-20 ENCOUNTER — PATIENT OUTREACH (OUTPATIENT)
Dept: PRIMARY CARE | Facility: CLINIC | Age: 59
End: 2024-03-20

## 2024-03-20 NOTE — PROGRESS NOTES
Unable to reach patient for one month post discharge follow up. LVM with call back number for patient to call if needed.

## 2024-03-25 ENCOUNTER — PATIENT OUTREACH (OUTPATIENT)
Dept: PHARMACY | Facility: HOSPITAL | Age: 59
End: 2024-03-25
Payer: COMMERCIAL

## 2024-03-25 ENCOUNTER — TELEPHONE (OUTPATIENT)
Dept: PRIMARY CARE | Facility: CLINIC | Age: 59
End: 2024-03-25
Payer: COMMERCIAL

## 2024-03-25 DIAGNOSIS — I10 BENIGN ESSENTIAL HYPERTENSION: ICD-10-CM

## 2024-03-25 DIAGNOSIS — Z79.4 TYPE 2 DIABETES MELLITUS WITH RIGHT EYE AFFECTED BY PROLIFERATIVE RETINOPATHY AND MACULAR EDEMA, WITH LONG-TERM CURRENT USE OF INSULIN (MULTI): ICD-10-CM

## 2024-03-25 DIAGNOSIS — E11.3511 TYPE 2 DIABETES MELLITUS WITH RIGHT EYE AFFECTED BY PROLIFERATIVE RETINOPATHY AND MACULAR EDEMA, WITH LONG-TERM CURRENT USE OF INSULIN (MULTI): ICD-10-CM

## 2024-03-25 RX ORDER — PEN NEEDLE, DIABETIC 30 GX3/16"
NEEDLE, DISPOSABLE MISCELLANEOUS
Qty: 360 EACH | Refills: 1 | Status: SHIPPED | OUTPATIENT
Start: 2024-03-25

## 2024-03-25 RX ORDER — BLOOD-GLUCOSE METER
EACH MISCELLANEOUS
Qty: 200 EACH | Refills: 1 | Status: SHIPPED | OUTPATIENT
Start: 2024-03-25 | End: 2024-05-30 | Stop reason: WASHOUT

## 2024-03-25 RX ORDER — AMLODIPINE BESYLATE 10 MG/1
10 TABLET ORAL DAILY
Qty: 90 TABLET | Refills: 1 | Status: SHIPPED | OUTPATIENT
Start: 2024-03-25

## 2024-03-25 NOTE — PROGRESS NOTES
Transitional Care Management: Pharmacy    Subjective   Nuris Squires is a 58 y.o. female who was referred to Clinical Pharmacy Team to complete TCM medication reconciliation prior to office visit with Mone Aquino MD. A comprehensive medication review was completed with the patient. patient had all medications and/or an updated medication list in front of them during the telephone encounter.     Admission Date:2/02/2024  Discharge Date:2/10/2024    Notable medication changes following discharge:  START: N/A  STOP: N/A  CHANGE: Humalog, Lantus    MEDICATION RECONCILIATION  Added: N/A  Changed: N/A  Removed: Heparin flush     No Known Allergies    [id number] - [place], [place] - [address] [facility]  [address] [facility] [address]  Phone: [phone] Fax: [phone]    [facility] - [place] [facility] - [facility] AT Portal to Registered [facility] Sites  [facility]  [place] [address]  Phone: [phone] Fax: [phone]    [facility]  [address], [address]  [facility] [address]  Phone: [phone] Fax: [phone]       No issues reported in regards to accessibility affordability adherence adverse effects organization.    HPI    Objective     LAB  There were no vitals taken for this visit.     Lab Results   Component Value Date    BILITOT 0.4 02/03/2024    CALCIUM 8.8 02/10/2024    CO2 27 02/10/2024     02/10/2024    CREATININE 1.90 (H) 02/10/2024    GLUCOSE 204 (H) 02/10/2024    ALKPHOS 91 02/03/2024    K 4.1 02/10/2024    PROT 7.1 02/03/2024     02/10/2024    AST 21 02/03/2024    ALT 22 02/03/2024    BUN 18 02/10/2024    ANIONGAP 12 02/10/2024    MG 2.20 02/10/2024    PHOS 4.3 02/10/2024    ALBUMIN 2.9 (L) 02/10/2024    AMYLASE 22 (L) 04/02/2019    LIPASE 5 (L) 02/06/2024    GFRF 44 (A) 08/13/2023     Lab Results   Component Value Date    TRIG 189 (H) 05/08/2023    CHOL 174 05/08/2023     HDL 38.5 (A) 05/08/2023     Lab Results   Component Value Date    HGBA1C 13.1 (H) 01/24/2024     DRUG INTERACTIONS  -None    Assessment/Plan    Comments/Recommendations to PCP:  Continued all pharmacotherapy as directed  Patient request refills on all her current medications, except for Basaglar. Message sent to her PCP (Dr. Mone Aquino). Confirmed refills was sent to patient's preferred pharmacy ([facility])     Continue all meds under the continuation of care with the referring provider and clinical pharmacy team.    Eulalia Swann PharmD    Verbal consent to manage patient's drug therapy was obtained from the patient. They were informed they may decline to participate or withdraw from participation in pharmacy services at any time.

## 2024-03-25 NOTE — TELEPHONE ENCOUNTER
Patient Is Requesting a Refill On BD Pen needles/ Marlene second Gen 32g by 32 in by 4 meters. I Do not see This in Her Medication List.

## 2024-03-29 ENCOUNTER — TELEPHONE (OUTPATIENT)
Dept: PRIMARY CARE | Facility: CLINIC | Age: 59
End: 2024-03-29
Payer: COMMERCIAL

## 2024-03-29 NOTE — TELEPHONE ENCOUNTER
[facility] called they are requesting that the patients last note states that she is using her Freestyle Remington for her diabetes. They need the note to say this for insurance purposes to get the remington approved.

## 2024-04-12 DIAGNOSIS — E11.69 TYPE 2 DIABETES MELLITUS WITH OTHER SPECIFIED COMPLICATION (MULTI): Primary | ICD-10-CM

## 2024-04-15 ENCOUNTER — APPOINTMENT (OUTPATIENT)
Dept: RADIOLOGY | Facility: HOSPITAL | Age: 59
DRG: 481 | End: 2024-04-15
Payer: COMMERCIAL

## 2024-04-15 ENCOUNTER — HOSPITAL ENCOUNTER (INPATIENT)
Facility: HOSPITAL | Age: 59
LOS: 9 days | Discharge: NURSING, REHAB OR LONG-TERM CARE | DRG: 481 | End: 2024-04-25
Attending: EMERGENCY MEDICINE | Admitting: SURGERY
Payer: COMMERCIAL

## 2024-04-15 ENCOUNTER — TELEMEDICINE (OUTPATIENT)
Dept: PHARMACY | Facility: HOSPITAL | Age: 59
End: 2024-04-15
Payer: COMMERCIAL

## 2024-04-15 ENCOUNTER — CLINICAL SUPPORT (OUTPATIENT)
Dept: EMERGENCY MEDICINE | Facility: HOSPITAL | Age: 59
DRG: 481 | End: 2024-04-15
Payer: COMMERCIAL

## 2024-04-15 DIAGNOSIS — S72.8X2A OTHER CLOSED FRACTURE OF LEFT FEMUR, UNSPECIFIED PORTION OF FEMUR, INITIAL ENCOUNTER (MULTI): Primary | ICD-10-CM

## 2024-04-15 DIAGNOSIS — E11.3511 TYPE 2 DIABETES MELLITUS WITH RIGHT EYE AFFECTED BY PROLIFERATIVE RETINOPATHY AND MACULAR EDEMA, WITH LONG-TERM CURRENT USE OF INSULIN (MULTI): ICD-10-CM

## 2024-04-15 DIAGNOSIS — Z79.4 TYPE 2 DIABETES MELLITUS WITH RIGHT EYE AFFECTED BY PROLIFERATIVE RETINOPATHY AND MACULAR EDEMA, WITH LONG-TERM CURRENT USE OF INSULIN (MULTI): ICD-10-CM

## 2024-04-15 PROBLEM — S72.92XA CLOSED FRACTURE OF LEFT FEMUR (MULTI): Status: ACTIVE | Noted: 2024-04-15

## 2024-04-15 LAB
ABO GROUP (TYPE) IN BLOOD: NORMAL
ALBUMIN SERPL BCP-MCNC: 3.5 G/DL (ref 3.4–5)
ALP SERPL-CCNC: 120 U/L (ref 33–110)
ALT SERPL W P-5'-P-CCNC: 23 U/L (ref 7–45)
ANION GAP SERPL CALC-SCNC: 11 MMOL/L (ref 10–20)
ANTIBODY SCREEN: NORMAL
APTT PPP: 31 SECONDS (ref 27–38)
AST SERPL W P-5'-P-CCNC: 22 U/L (ref 9–39)
ATRIAL RATE: 77 BPM
B-HCG SERPL-ACNC: 3 MIU/ML
BASOPHILS # BLD AUTO: 0.05 X10*3/UL (ref 0–0.1)
BASOPHILS NFR BLD AUTO: 0.5 %
BILIRUB SERPL-MCNC: 0.4 MG/DL (ref 0–1.2)
BUN SERPL-MCNC: 30 MG/DL (ref 6–23)
CALCIUM SERPL-MCNC: 8.9 MG/DL (ref 8.6–10.6)
CHLORIDE SERPL-SCNC: 103 MMOL/L (ref 98–107)
CO2 SERPL-SCNC: 26 MMOL/L (ref 21–32)
CREAT SERPL-MCNC: 1.36 MG/DL (ref 0.5–1.05)
EGFRCR SERPLBLD CKD-EPI 2021: 45 ML/MIN/1.73M*2
EOSINOPHIL # BLD AUTO: 0.06 X10*3/UL (ref 0–0.7)
EOSINOPHIL NFR BLD AUTO: 0.6 %
ERYTHROCYTE [DISTWIDTH] IN BLOOD BY AUTOMATED COUNT: 12.5 % (ref 11.5–14.5)
GLUCOSE SERPL-MCNC: 399 MG/DL (ref 74–99)
HCT VFR BLD AUTO: 31.6 % (ref 36–46)
HGB BLD-MCNC: 10.6 G/DL (ref 12–16)
IMM GRANULOCYTES # BLD AUTO: 0.04 X10*3/UL (ref 0–0.7)
IMM GRANULOCYTES NFR BLD AUTO: 0.4 % (ref 0–0.9)
INR PPP: 0.9 (ref 0.9–1.1)
LYMPHOCYTES # BLD AUTO: 1.76 X10*3/UL (ref 1.2–4.8)
LYMPHOCYTES NFR BLD AUTO: 18.3 %
MCH RBC QN AUTO: 28.6 PG (ref 26–34)
MCHC RBC AUTO-ENTMCNC: 33.5 G/DL (ref 32–36)
MCV RBC AUTO: 85 FL (ref 80–100)
MONOCYTES # BLD AUTO: 0.43 X10*3/UL (ref 0.1–1)
MONOCYTES NFR BLD AUTO: 4.5 %
NEUTROPHILS # BLD AUTO: 7.26 X10*3/UL (ref 1.2–7.7)
NEUTROPHILS NFR BLD AUTO: 75.7 %
NRBC BLD-RTO: 0 /100 WBCS (ref 0–0)
P AXIS: 56 DEGREES
P OFFSET: 211 MS
P ONSET: 159 MS
PLATELET # BLD AUTO: 318 X10*3/UL (ref 150–450)
POTASSIUM SERPL-SCNC: 4.2 MMOL/L (ref 3.5–5.3)
PR INTERVAL: 122 MS
PROT SERPL-MCNC: 6.9 G/DL (ref 6.4–8.2)
PROTHROMBIN TIME: 10.3 SECONDS (ref 9.8–12.8)
Q ONSET: 220 MS
QRS COUNT: 13 BEATS
QRS DURATION: 86 MS
QT INTERVAL: 352 MS
QTC CALCULATION(BAZETT): 398 MS
QTC FREDERICIA: 382 MS
R AXIS: 85 DEGREES
RBC # BLD AUTO: 3.7 X10*6/UL (ref 4–5.2)
RH FACTOR (ANTIGEN D): NORMAL
SODIUM SERPL-SCNC: 136 MMOL/L (ref 136–145)
T AXIS: 11 DEGREES
T OFFSET: 396 MS
VENTRICULAR RATE: 77 BPM
WBC # BLD AUTO: 9.6 X10*3/UL (ref 4.4–11.3)

## 2024-04-15 PROCEDURE — 73552 X-RAY EXAM OF FEMUR 2/>: CPT | Mod: LT

## 2024-04-15 PROCEDURE — 72170 X-RAY EXAM OF PELVIS: CPT | Mod: FOREIGN READ | Performed by: RADIOLOGY

## 2024-04-15 PROCEDURE — 73700 CT LOWER EXTREMITY W/O DYE: CPT | Mod: LEFT SIDE | Performed by: RADIOLOGY

## 2024-04-15 PROCEDURE — 99285 EMERGENCY DEPT VISIT HI MDM: CPT | Mod: 25

## 2024-04-15 PROCEDURE — G0390 TRAUMA RESPONS W/HOSP CRITI: HCPCS

## 2024-04-15 PROCEDURE — 99285 EMERGENCY DEPT VISIT HI MDM: CPT

## 2024-04-15 PROCEDURE — 71046 X-RAY EXAM CHEST 2 VIEWS: CPT

## 2024-04-15 PROCEDURE — 84702 CHORIONIC GONADOTROPIN TEST: CPT

## 2024-04-15 PROCEDURE — 71046 X-RAY EXAM CHEST 2 VIEWS: CPT | Mod: FOREIGN READ | Performed by: RADIOLOGY

## 2024-04-15 PROCEDURE — 2500000004 HC RX 250 GENERAL PHARMACY W/ HCPCS (ALT 636 FOR OP/ED)

## 2024-04-15 PROCEDURE — 73590 X-RAY EXAM OF LOWER LEG: CPT | Mod: LT

## 2024-04-15 PROCEDURE — 93005 ELECTROCARDIOGRAM TRACING: CPT

## 2024-04-15 PROCEDURE — 86900 BLOOD TYPING SEROLOGIC ABO: CPT

## 2024-04-15 PROCEDURE — 73700 CT LOWER EXTREMITY W/O DYE: CPT | Mod: LT

## 2024-04-15 PROCEDURE — 86901 BLOOD TYPING SEROLOGIC RH(D): CPT

## 2024-04-15 PROCEDURE — 85610 PROTHROMBIN TIME: CPT

## 2024-04-15 PROCEDURE — 85730 THROMBOPLASTIN TIME PARTIAL: CPT

## 2024-04-15 PROCEDURE — 85025 COMPLETE CBC W/AUTO DIFF WBC: CPT

## 2024-04-15 PROCEDURE — 72170 X-RAY EXAM OF PELVIS: CPT

## 2024-04-15 PROCEDURE — 73560 X-RAY EXAM OF KNEE 1 OR 2: CPT | Mod: LT

## 2024-04-15 PROCEDURE — 96375 TX/PRO/DX INJ NEW DRUG ADDON: CPT

## 2024-04-15 PROCEDURE — 80053 COMPREHEN METABOLIC PANEL: CPT

## 2024-04-15 PROCEDURE — 96374 THER/PROPH/DIAG INJ IV PUSH: CPT

## 2024-04-15 RX ORDER — FENTANYL CITRATE 50 UG/ML
50 INJECTION, SOLUTION INTRAMUSCULAR; INTRAVENOUS ONCE
Status: COMPLETED | OUTPATIENT
Start: 2024-04-15 | End: 2024-04-15

## 2024-04-15 RX ORDER — HYDROMORPHONE HYDROCHLORIDE 1 MG/ML
1 INJECTION, SOLUTION INTRAMUSCULAR; INTRAVENOUS; SUBCUTANEOUS ONCE
Status: COMPLETED | OUTPATIENT
Start: 2024-04-15 | End: 2024-04-15

## 2024-04-15 RX ORDER — FENTANYL CITRATE 50 UG/ML
50 INJECTION, SOLUTION INTRAMUSCULAR; INTRAVENOUS ONCE
Status: COMPLETED | OUTPATIENT
Start: 2024-04-15 | End: 2024-04-16

## 2024-04-15 RX ADMIN — FENTANYL CITRATE 50 MCG: 50 INJECTION, SOLUTION INTRAMUSCULAR; INTRAVENOUS at 21:18

## 2024-04-15 RX ADMIN — HYDROMORPHONE HYDROCHLORIDE 1 MG: 1 INJECTION, SOLUTION INTRAMUSCULAR; INTRAVENOUS; SUBCUTANEOUS at 20:15

## 2024-04-15 ASSESSMENT — PAIN - FUNCTIONAL ASSESSMENT: PAIN_FUNCTIONAL_ASSESSMENT: 0-10

## 2024-04-15 ASSESSMENT — PAIN DESCRIPTION - PAIN TYPE: TYPE: ACUTE PAIN

## 2024-04-15 ASSESSMENT — PAIN SCALES - GENERAL: PAINLEVEL_OUTOF10: 10 - WORST POSSIBLE PAIN
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
## 2024-04-15 NOTE — PROGRESS NOTES
"Outpatient Clinical Pharmacy Visit  Nuris Squires is a 58 y.o. female who was referred to the ambulatory Clinical Pharmacy Team for their Diabetes.    Referring Provider: Mone Aquino MD    Subjective   No Known Allergies    Preferred Pharmacy    [id number] - [place], [place] - [address] [facility]  [address] [facility] [address]  Phone: [phone] Fax: [phone]    [facility] - [place] [facility] - [facility] AT Portal to Registered [facility] Sites  [facility]  [place] [address]  Phone: [phone] Fax: [phone]    [facility]  [address]  [facility] [address]  Phone: [phone] Fax: [phone]    DIABETES ASSESSMENT   Diagnosed 2009, complications include retinopathy, CKD, toe amputations.     Current Diabetes Medication Regimen    Basaglar 12 units Once daily   Humalog TID with meals per sliding scale, 10 units TID, if BG >150 patient will do 15 units    Secondary Prevention  Statin? Yes, describe: atorvastatin 40 mg daily  ACE-I/ARB? No, currently holding  Aspirin? Yes    Pertinent PMH Review:  PMH of Pancreatitis: No  PMH of Retinopathy: Yes  PMH/FH of Medullary Thyroid Carcinoma or Multiple Endocrine Neoplasia Type 2: No  PMH of Urinary Tract Infections: No    Health Maintenance:   Foot Exam: Following with podiatry  Eye Exam: Following with ophthalmology   Lipid Panel: LDL 98 mg/dL and triglycerides 189 mg/dL    DIET: Trying to eat less fried foods, has been cooking more at home in her [facility]  Wake-up (8-9 am)  Breakfast (typically skips)  Lunch (12 pm)  Dinner (6 pm); protein, vegetable, starch/carb  Bedtime (9-10 pm)  Snack (anytime throughout the day); apples, honey grams, chips)  *Patient notes she is \"not big on vegetables\"; will eat cabbage and greens, however    EXERCISE: Working with PT/OT, walking around house    Current monitoring regimen:   Patient is using: " glucometer, OneTouch Ultra 2; also Remington 3 with reader on order, but has not been ordered yet.   Testing frequency: Constantly  Barriers to testing: No    Patient-Reported Blood Glucose:  Random BG 150s-200s (previous)  Random BG 200s-300s    Has the patient experienced any low sugars since last contact? no  denies symptoms of low blood glucose: sweaty, shaky, anxious, excessive hunger, confusion, lightheaded, nauseous, blurred vision  Has the patient experienced any high sugars since last contact? Some BGs in the 300s  reports symptoms of high blood glucose: excessive thirst, frequent urination, fatigue, nausea, shortness of breath, trouble concentrating    Diabetes Patient Education    PATIENT GOALS   Fasting B - 130 mg/dL 2-HR Postprandial BG:   Less than 180 mg/dL A1c:   Less than 6.5 % or 7.0 %     Rule of 15: eating ~15 g of carbs when BG less than 80 (chocolate, half cup juice, 3-4 glucose tabs).  Recognize symptoms of high and low blood sugar.   Eat a realistic healthy diet consisting of fruits, vegetables, fiber, protein food choices on a regular basis and be aware of portion/serving sizes. Reduce carbohydrate consumption and always consume with protein and fat. Avoid foods high in saturated/trans fat, high salt content, and sweets and beverages with added sugars.  Limit alcohol consumption; alcohol may affect your blood sugar and cause hypoglycemia.   If not at a healthy weight, stay active and incorporate ~30 mins of exercise into your daily routine to manage your weight and increase the body's acceptance of insulin.        Laboratory Results  Lab Results   Component Value Date    BILITOT 0.4 2024    CALCIUM 8.8 02/10/2024    CO2 27 02/10/2024     02/10/2024    CREATININE 1.90 (H) 02/10/2024    GLUCOSE 204 (H) 02/10/2024    ALKPHOS 91 2024    K 4.1 02/10/2024    PROT 7.1 2024     02/10/2024    AST 21 2024    ALT 22 2024    BUN 18 02/10/2024    ANIONGAP 12  "02/10/2024    MG 2.20 02/10/2024    PHOS 4.3 02/10/2024    ALBUMIN 2.9 (L) 02/10/2024    AMYLASE 22 (L) 04/02/2019    LIPASE 5 (L) 02/06/2024    GFRF 44 (A) 08/13/2023    EGFR 30 (L) 02/10/2024     Lab Results   Component Value Date    TRIG 189 (H) 05/08/2023    CHOL 174 05/08/2023    HDL 38.5 (A) 05/08/2023     Lab Results   Component Value Date    HGBA1C 13.1 (H) 01/24/2024       Assessment/Plan   Problem List Items Addressed This Visit       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 Patient cannot identify any BG patterns, such as bedtime-morning, pre-/post-meals)  Connecting with [employer] App will substantially assist Clinical Pharmacy in making insulin adjustment decisions     Plan/Changes   Continue all meds under the continuation of care with the referring provider and clinical pharmacy team    Next Clinical Pharmacy Follow-Up  Monday, May 6th, 1:00 am with Margie Hoffman PharmD     Verbal consent to manage patient's drug therapy was obtained from patients. They were informed they may decline to participate or withdraw from participation in pharmacy services at any time.  "

## 2024-04-16 ENCOUNTER — ANESTHESIA (OUTPATIENT)
Dept: OPERATING ROOM | Facility: HOSPITAL | Age: 59
DRG: 481 | End: 2024-04-16
Payer: COMMERCIAL

## 2024-04-16 ENCOUNTER — ANESTHESIA EVENT (OUTPATIENT)
Dept: OPERATING ROOM | Facility: HOSPITAL | Age: 59
DRG: 481 | End: 2024-04-16
Payer: COMMERCIAL

## 2024-04-16 ENCOUNTER — APPOINTMENT (OUTPATIENT)
Dept: RADIOLOGY | Facility: HOSPITAL | Age: 59
DRG: 481 | End: 2024-04-16
Payer: COMMERCIAL

## 2024-04-16 PROBLEM — Z99.81 HISTORY OF HOME OXYGEN THERAPY: Status: ACTIVE | Noted: 2024-04-16

## 2024-04-16 PROBLEM — D64.9 ANEMIA: Status: ACTIVE | Noted: 2024-04-16

## 2024-04-16 PROBLEM — S72.8X2A: Status: ACTIVE | Noted: 2024-04-16

## 2024-04-16 PROBLEM — R06.09 DYSPNEA ON EXERTION: Status: ACTIVE | Noted: 2024-01-25

## 2024-04-16 PROBLEM — N18.9 CHRONIC RENAL INSUFFICIENCY: Status: ACTIVE | Noted: 2024-04-16

## 2024-04-16 PROBLEM — E11.21 DIABETIC NEPHROPATHY (MULTI): Status: ACTIVE | Noted: 2024-04-16

## 2024-04-16 LAB
ERYTHROCYTE [DISTWIDTH] IN BLOOD BY AUTOMATED COUNT: 12.5 % (ref 11.5–14.5)
EST. AVERAGE GLUCOSE BLD GHB EST-MCNC: 295 MG/DL
GLUCOSE BLD MANUAL STRIP-MCNC: 294 MG/DL (ref 74–99)
GLUCOSE BLD MANUAL STRIP-MCNC: 348 MG/DL (ref 74–99)
GLUCOSE BLD MANUAL STRIP-MCNC: 431 MG/DL (ref 74–99)
HBA1C MFR BLD: 11.9 %
HCT VFR BLD AUTO: 27.2 % (ref 36–46)
HGB BLD-MCNC: 8.7 G/DL (ref 12–16)
MCH RBC QN AUTO: 28.6 PG (ref 26–34)
MCHC RBC AUTO-ENTMCNC: 32 G/DL (ref 32–36)
MCV RBC AUTO: 90 FL (ref 80–100)
NRBC BLD-RTO: 0 /100 WBCS (ref 0–0)
PLATELET # BLD AUTO: 256 X10*3/UL (ref 150–450)
RBC # BLD AUTO: 3.04 X10*6/UL (ref 4–5.2)
WBC # BLD AUTO: 9.2 X10*3/UL (ref 4.4–11.3)

## 2024-04-16 PROCEDURE — 96376 TX/PRO/DX INJ SAME DRUG ADON: CPT

## 2024-04-16 PROCEDURE — 3700000001 HC GENERAL ANESTHESIA TIME - INITIAL BASE CHARGE: Performed by: ORTHOPAEDIC SURGERY

## 2024-04-16 PROCEDURE — 99232 SBSQ HOSP IP/OBS MODERATE 35: CPT

## 2024-04-16 PROCEDURE — 83036 HEMOGLOBIN GLYCOSYLATED A1C: CPT

## 2024-04-16 PROCEDURE — 82947 ASSAY GLUCOSE BLOOD QUANT: CPT

## 2024-04-16 PROCEDURE — 2720000007 HC OR 272 NO HCPCS: Performed by: ORTHOPAEDIC SURGERY

## 2024-04-16 PROCEDURE — 1100000001 HC PRIVATE ROOM DAILY

## 2024-04-16 PROCEDURE — 85027 COMPLETE CBC AUTOMATED: CPT

## 2024-04-16 PROCEDURE — 27506 TREATMENT OF THIGH FRACTURE: CPT | Performed by: ORTHOPAEDIC SURGERY

## 2024-04-16 PROCEDURE — C1776 JOINT DEVICE (IMPLANTABLE): HCPCS | Performed by: ORTHOPAEDIC SURGERY

## 2024-04-16 PROCEDURE — 3700000002 HC GENERAL ANESTHESIA TIME - EACH INCREMENTAL 1 MINUTE: Performed by: ORTHOPAEDIC SURGERY

## 2024-04-16 PROCEDURE — 2500000001 HC RX 250 WO HCPCS SELF ADMINISTERED DRUGS (ALT 637 FOR MEDICARE OP)

## 2024-04-16 PROCEDURE — 3600000009 HC OR TIME - EACH INCREMENTAL 1 MINUTE - PROCEDURE LEVEL FOUR: Performed by: ORTHOPAEDIC SURGERY

## 2024-04-16 PROCEDURE — 2500000004 HC RX 250 GENERAL PHARMACY W/ HCPCS (ALT 636 FOR OP/ED): Mod: JZ

## 2024-04-16 PROCEDURE — 2500000004 HC RX 250 GENERAL PHARMACY W/ HCPCS (ALT 636 FOR OP/ED)

## 2024-04-16 PROCEDURE — C1781 MESH (IMPLANTABLE): HCPCS | Performed by: ORTHOPAEDIC SURGERY

## 2024-04-16 PROCEDURE — C1713 ANCHOR/SCREW BN/BN,TIS/BN: HCPCS | Performed by: ORTHOPAEDIC SURGERY

## 2024-04-16 PROCEDURE — A27506 PR OPEN RX FEMUR FX+INTRAMED ROD

## 2024-04-16 PROCEDURE — 27513 TREATMENT OF THIGH FRACTURE: CPT | Performed by: ORTHOPAEDIC SURGERY

## 2024-04-16 PROCEDURE — 3E0V3GB INTRODUCTION OF RECOMBINANT BONE MORPHOGENETIC PROTEIN INTO BONES, PERCUTANEOUS APPROACH: ICD-10-PCS | Performed by: ORTHOPAEDIC SURGERY

## 2024-04-16 PROCEDURE — 7100000002 HC RECOVERY ROOM TIME - EACH INCREMENTAL 1 MINUTE: Performed by: ORTHOPAEDIC SURGERY

## 2024-04-16 PROCEDURE — C1769 GUIDE WIRE: HCPCS | Performed by: ORTHOPAEDIC SURGERY

## 2024-04-16 PROCEDURE — 27506 TREATMENT OF THIGH FRACTURE: CPT | Performed by: STUDENT IN AN ORGANIZED HEALTH CARE EDUCATION/TRAINING PROGRAM

## 2024-04-16 PROCEDURE — 7100000001 HC RECOVERY ROOM TIME - INITIAL BASE CHARGE: Performed by: ORTHOPAEDIC SURGERY

## 2024-04-16 PROCEDURE — 2500000005 HC RX 250 GENERAL PHARMACY W/O HCPCS

## 2024-04-16 PROCEDURE — 27513 TREATMENT OF THIGH FRACTURE: CPT | Performed by: STUDENT IN AN ORGANIZED HEALTH CARE EDUCATION/TRAINING PROGRAM

## 2024-04-16 PROCEDURE — 3600000004 HC OR TIME - INITIAL BASE CHARGE - PROCEDURE LEVEL FOUR: Performed by: ORTHOPAEDIC SURGERY

## 2024-04-16 PROCEDURE — A4649 SURGICAL SUPPLIES: HCPCS | Performed by: ORTHOPAEDIC SURGERY

## 2024-04-16 PROCEDURE — 0QS936Z REPOSITION LEFT FEMORAL SHAFT WITH INTRAMEDULLARY INTERNAL FIXATION DEVICE, PERCUTANEOUS APPROACH: ICD-10-PCS | Performed by: ORTHOPAEDIC SURGERY

## 2024-04-16 PROCEDURE — 99222 1ST HOSP IP/OBS MODERATE 55: CPT | Performed by: ORTHOPAEDIC SURGERY

## 2024-04-16 PROCEDURE — A27506 PR OPEN RX FEMUR FX+INTRAMED ROD: Performed by: STUDENT IN AN ORGANIZED HEALTH CARE EDUCATION/TRAINING PROGRAM

## 2024-04-16 PROCEDURE — 2780000003 HC OR 278 NO HCPCS: Performed by: ORTHOPAEDIC SURGERY

## 2024-04-16 PROCEDURE — 2500000004 HC RX 250 GENERAL PHARMACY W/ HCPCS (ALT 636 FOR OP/ED): Performed by: STUDENT IN AN ORGANIZED HEALTH CARE EDUCATION/TRAINING PROGRAM

## 2024-04-16 PROCEDURE — 96361 HYDRATE IV INFUSION ADD-ON: CPT

## 2024-04-16 PROCEDURE — A4217 STERILE WATER/SALINE, 500 ML: HCPCS | Performed by: ORTHOPAEDIC SURGERY

## 2024-04-16 PROCEDURE — 2500000004 HC RX 250 GENERAL PHARMACY W/ HCPCS (ALT 636 FOR OP/ED): Performed by: ORTHOPAEDIC SURGERY

## 2024-04-16 DEVICE — IMPLANTABLE DEVICE: Type: IMPLANTABLE DEVICE | Site: KNEE | Status: FUNCTIONAL

## 2024-04-16 DEVICE — SCREW, ADVANCED LOCKING, 5 X 80MM: Type: IMPLANTABLE DEVICE | Site: KNEE | Status: FUNCTIONAL

## 2024-04-16 DEVICE — ALLOGRAFT, MAGNUS 5.5CC: Type: IMPLANTABLE DEVICE | Site: KNEE | Status: FUNCTIONAL

## 2024-04-16 DEVICE — END CAP, SCN, FULLY THREADED, T2: Type: IMPLANTABLE DEVICE | Site: KNEE | Status: FUNCTIONAL

## 2024-04-16 DEVICE — GUIDE WIRE, GAM, 3.0MM X 1000MM: Type: IMPLANTABLE DEVICE | Site: KNEE | Status: FUNCTIONAL

## 2024-04-16 DEVICE — GUIDEWIRE, 1.6MM X 220MM, TROCAR TIP: Type: IMPLANTABLE DEVICE | Site: KNEE | Status: FUNCTIONAL

## 2024-04-16 DEVICE — SCREW, ADVANCED LOCKING, 5 X 70MM: Type: IMPLANTABLE DEVICE | Site: KNEE | Status: FUNCTIONAL

## 2024-04-16 DEVICE — SCREW, LOCKING, 5 X 32.5MM: Type: IMPLANTABLE DEVICE | Site: KNEE | Status: FUNCTIONAL

## 2024-04-16 RX ORDER — ALBUTEROL SULFATE 0.83 MG/ML
2.5 SOLUTION RESPIRATORY (INHALATION) ONCE AS NEEDED
Status: DISCONTINUED | OUTPATIENT
Start: 2024-04-16 | End: 2024-04-16 | Stop reason: HOSPADM

## 2024-04-16 RX ORDER — SODIUM CHLORIDE, SODIUM LACTATE, POTASSIUM CHLORIDE, CALCIUM CHLORIDE 600; 310; 30; 20 MG/100ML; MG/100ML; MG/100ML; MG/100ML
50 INJECTION, SOLUTION INTRAVENOUS CONTINUOUS
Status: DISCONTINUED | OUTPATIENT
Start: 2024-04-16 | End: 2024-04-16

## 2024-04-16 RX ORDER — LABETALOL HYDROCHLORIDE 5 MG/ML
5 INJECTION, SOLUTION INTRAVENOUS ONCE AS NEEDED
Status: DISCONTINUED | OUTPATIENT
Start: 2024-04-16 | End: 2024-04-16 | Stop reason: HOSPADM

## 2024-04-16 RX ORDER — POLYETHYLENE GLYCOL 3350 17 G/17G
17 POWDER, FOR SOLUTION ORAL DAILY
Status: DISCONTINUED | OUTPATIENT
Start: 2024-04-16 | End: 2024-04-23

## 2024-04-16 RX ORDER — SODIUM CHLORIDE 0.9 G/100ML
IRRIGANT IRRIGATION AS NEEDED
Status: DISCONTINUED | OUTPATIENT
Start: 2024-04-16 | End: 2024-04-16 | Stop reason: HOSPADM

## 2024-04-16 RX ORDER — PHENYLEPHRINE 10 MG/250 ML(40 MCG/ML)IN 0.9 % SOD.CHLORIDE INTRAVENOUS
CONTINUOUS PRN
Status: DISCONTINUED | OUTPATIENT
Start: 2024-04-16 | End: 2024-04-16

## 2024-04-16 RX ORDER — HYDROMORPHONE HYDROCHLORIDE 1 MG/ML
0.2 INJECTION, SOLUTION INTRAMUSCULAR; INTRAVENOUS; SUBCUTANEOUS EVERY 5 MIN PRN
Status: DISCONTINUED | OUTPATIENT
Start: 2024-04-16 | End: 2024-04-16 | Stop reason: HOSPADM

## 2024-04-16 RX ORDER — ONDANSETRON HYDROCHLORIDE 2 MG/ML
4 INJECTION, SOLUTION INTRAVENOUS EVERY 8 HOURS PRN
Status: DISCONTINUED | OUTPATIENT
Start: 2024-04-16 | End: 2024-04-25 | Stop reason: HOSPADM

## 2024-04-16 RX ORDER — OXYCODONE HYDROCHLORIDE 5 MG/1
5 TABLET ORAL EVERY 6 HOURS PRN
Status: DISCONTINUED | OUTPATIENT
Start: 2024-04-16 | End: 2024-04-19

## 2024-04-16 RX ORDER — CLONIDINE HYDROCHLORIDE 0.1 MG/1
0.1 TABLET ORAL 2 TIMES DAILY
Status: DISCONTINUED | OUTPATIENT
Start: 2024-04-16 | End: 2024-04-25 | Stop reason: HOSPADM

## 2024-04-16 RX ORDER — ATORVASTATIN CALCIUM 40 MG/1
40 TABLET, FILM COATED ORAL DAILY
Status: DISCONTINUED | OUTPATIENT
Start: 2024-04-16 | End: 2024-04-25 | Stop reason: HOSPADM

## 2024-04-16 RX ORDER — CEFAZOLIN 1 G/1
INJECTION, POWDER, FOR SOLUTION INTRAVENOUS AS NEEDED
Status: DISCONTINUED | OUTPATIENT
Start: 2024-04-16 | End: 2024-04-16

## 2024-04-16 RX ORDER — SODIUM CHLORIDE, SODIUM LACTATE, POTASSIUM CHLORIDE, CALCIUM CHLORIDE 600; 310; 30; 20 MG/100ML; MG/100ML; MG/100ML; MG/100ML
100 INJECTION, SOLUTION INTRAVENOUS CONTINUOUS
Status: DISCONTINUED | OUTPATIENT
Start: 2024-04-16 | End: 2024-04-16 | Stop reason: HOSPADM

## 2024-04-16 RX ORDER — ACETAMINOPHEN 325 MG/1
650 TABLET ORAL EVERY 4 HOURS PRN
Status: DISCONTINUED | OUTPATIENT
Start: 2024-04-16 | End: 2024-04-16 | Stop reason: HOSPADM

## 2024-04-16 RX ORDER — ENOXAPARIN SODIUM 100 MG/ML
40 INJECTION SUBCUTANEOUS EVERY 12 HOURS
Status: DISCONTINUED | OUTPATIENT
Start: 2024-04-16 | End: 2024-04-24

## 2024-04-16 RX ORDER — ONDANSETRON 4 MG/1
4 TABLET, FILM COATED ORAL EVERY 8 HOURS PRN
Status: DISCONTINUED | OUTPATIENT
Start: 2024-04-16 | End: 2024-04-25 | Stop reason: HOSPADM

## 2024-04-16 RX ORDER — PROPOFOL 10 MG/ML
INJECTION, EMULSION INTRAVENOUS AS NEEDED
Status: DISCONTINUED | OUTPATIENT
Start: 2024-04-16 | End: 2024-04-16

## 2024-04-16 RX ORDER — DEXTROSE 50 % IN WATER (D50W) INTRAVENOUS SYRINGE
12.5
Status: DISCONTINUED | OUTPATIENT
Start: 2024-04-16 | End: 2024-04-25 | Stop reason: HOSPADM

## 2024-04-16 RX ORDER — NAPROXEN SODIUM 220 MG/1
81 TABLET, FILM COATED ORAL DAILY
Status: DISCONTINUED | OUTPATIENT
Start: 2024-04-16 | End: 2024-04-25 | Stop reason: HOSPADM

## 2024-04-16 RX ORDER — FOLIC ACID 1 MG/1
1 TABLET ORAL DAILY
Status: DISCONTINUED | OUTPATIENT
Start: 2024-04-16 | End: 2024-04-25 | Stop reason: HOSPADM

## 2024-04-16 RX ORDER — DEXTROSE 50 % IN WATER (D50W) INTRAVENOUS SYRINGE
25
Status: DISCONTINUED | OUTPATIENT
Start: 2024-04-16 | End: 2024-04-25 | Stop reason: HOSPADM

## 2024-04-16 RX ORDER — AMLODIPINE BESYLATE 10 MG/1
10 TABLET ORAL DAILY
Status: DISCONTINUED | OUTPATIENT
Start: 2024-04-16 | End: 2024-04-25 | Stop reason: HOSPADM

## 2024-04-16 RX ORDER — LIDOCAINE HYDROCHLORIDE 20 MG/ML
INJECTION, SOLUTION INFILTRATION; PERINEURAL AS NEEDED
Status: DISCONTINUED | OUTPATIENT
Start: 2024-04-16 | End: 2024-04-16

## 2024-04-16 RX ORDER — ONDANSETRON HYDROCHLORIDE 2 MG/ML
INJECTION, SOLUTION INTRAVENOUS AS NEEDED
Status: DISCONTINUED | OUTPATIENT
Start: 2024-04-16 | End: 2024-04-16

## 2024-04-16 RX ORDER — INSULIN LISPRO 100 [IU]/ML
0-10 INJECTION, SOLUTION INTRAVENOUS; SUBCUTANEOUS
Status: DISCONTINUED | OUTPATIENT
Start: 2024-04-17 | End: 2024-04-25 | Stop reason: HOSPADM

## 2024-04-16 RX ORDER — GABAPENTIN 300 MG/1
300 CAPSULE ORAL 2 TIMES DAILY
Status: DISCONTINUED | OUTPATIENT
Start: 2024-04-16 | End: 2024-04-25 | Stop reason: HOSPADM

## 2024-04-16 RX ORDER — ACETAMINOPHEN 325 MG/1
975 TABLET ORAL EVERY 6 HOURS SCHEDULED
Status: DISCONTINUED | OUTPATIENT
Start: 2024-04-16 | End: 2024-04-22

## 2024-04-16 RX ORDER — ONDANSETRON HYDROCHLORIDE 2 MG/ML
4 INJECTION, SOLUTION INTRAVENOUS ONCE AS NEEDED
Status: DISCONTINUED | OUTPATIENT
Start: 2024-04-16 | End: 2024-04-16 | Stop reason: HOSPADM

## 2024-04-16 RX ORDER — OXYCODONE HYDROCHLORIDE 5 MG/1
5 TABLET ORAL EVERY 4 HOURS PRN
Status: DISCONTINUED | OUTPATIENT
Start: 2024-04-16 | End: 2024-04-16 | Stop reason: HOSPADM

## 2024-04-16 RX ORDER — INSULIN LISPRO 100 [IU]/ML
10 INJECTION, SOLUTION INTRAVENOUS; SUBCUTANEOUS
Status: DISCONTINUED | OUTPATIENT
Start: 2024-04-16 | End: 2024-04-25 | Stop reason: HOSPADM

## 2024-04-16 RX ORDER — FENTANYL CITRATE 50 UG/ML
INJECTION, SOLUTION INTRAMUSCULAR; INTRAVENOUS AS NEEDED
Status: DISCONTINUED | OUTPATIENT
Start: 2024-04-16 | End: 2024-04-16

## 2024-04-16 RX ORDER — MIDAZOLAM HYDROCHLORIDE 1 MG/ML
INJECTION INTRAMUSCULAR; INTRAVENOUS AS NEEDED
Status: DISCONTINUED | OUTPATIENT
Start: 2024-04-16 | End: 2024-04-16

## 2024-04-16 RX ORDER — CHOLECALCIFEROL (VITAMIN D3) 25 MCG
2000 TABLET ORAL
Status: DISCONTINUED | OUTPATIENT
Start: 2024-04-16 | End: 2024-04-25 | Stop reason: HOSPADM

## 2024-04-16 RX ORDER — CEFAZOLIN SODIUM 2 G/100ML
2 INJECTION, SOLUTION INTRAVENOUS EVERY 8 HOURS
Status: DISPENSED | OUTPATIENT
Start: 2024-04-16 | End: 2024-04-17

## 2024-04-16 RX ORDER — VANCOMYCIN HYDROCHLORIDE 1 G/20ML
INJECTION, POWDER, LYOPHILIZED, FOR SOLUTION INTRAVENOUS AS NEEDED
Status: DISCONTINUED | OUTPATIENT
Start: 2024-04-16 | End: 2024-04-16 | Stop reason: HOSPADM

## 2024-04-16 RX ORDER — NALOXONE HYDROCHLORIDE 0.4 MG/ML
0.2 INJECTION, SOLUTION INTRAMUSCULAR; INTRAVENOUS; SUBCUTANEOUS EVERY 5 MIN PRN
Status: DISCONTINUED | OUTPATIENT
Start: 2024-04-16 | End: 2024-04-25 | Stop reason: HOSPADM

## 2024-04-16 RX ORDER — HYDROMORPHONE HYDROCHLORIDE 1 MG/ML
0.4 INJECTION, SOLUTION INTRAMUSCULAR; INTRAVENOUS; SUBCUTANEOUS EVERY 5 MIN PRN
Status: DISCONTINUED | OUTPATIENT
Start: 2024-04-16 | End: 2024-04-16 | Stop reason: HOSPADM

## 2024-04-16 RX ORDER — PHENYLEPHRINE HCL IN 0.9% NACL 0.4MG/10ML
SYRINGE (ML) INTRAVENOUS AS NEEDED
Status: DISCONTINUED | OUTPATIENT
Start: 2024-04-16 | End: 2024-04-16

## 2024-04-16 RX ORDER — VITAMIN B COMPLEX
1 CAPSULE ORAL DAILY
COMMUNITY

## 2024-04-16 RX ORDER — INSULIN LISPRO 100 [IU]/ML
0-5 INJECTION, SOLUTION INTRAVENOUS; SUBCUTANEOUS
Status: DISCONTINUED | OUTPATIENT
Start: 2024-04-17 | End: 2024-04-16

## 2024-04-16 RX ORDER — INSULIN GLARGINE 100 [IU]/ML
12 INJECTION, SOLUTION SUBCUTANEOUS NIGHTLY
Status: DISCONTINUED | OUTPATIENT
Start: 2024-04-16 | End: 2024-04-22

## 2024-04-16 RX ORDER — ROCURONIUM BROMIDE 10 MG/ML
INJECTION, SOLUTION INTRAVENOUS AS NEEDED
Status: DISCONTINUED | OUTPATIENT
Start: 2024-04-16 | End: 2024-04-16

## 2024-04-16 RX ORDER — FOLIC ACID 1 MG/1
1 TABLET ORAL DAILY
COMMUNITY

## 2024-04-16 RX ORDER — TRANEXAMIC ACID 100 MG/ML
INJECTION, SOLUTION INTRAVENOUS AS NEEDED
Status: DISCONTINUED | OUTPATIENT
Start: 2024-04-16 | End: 2024-04-16

## 2024-04-16 RX ORDER — ENOXAPARIN SODIUM 100 MG/ML
40 INJECTION SUBCUTANEOUS 2 TIMES DAILY
Status: DISCONTINUED | OUTPATIENT
Start: 2024-04-16 | End: 2024-04-16

## 2024-04-16 RX ORDER — NALOXONE HYDROCHLORIDE 0.4 MG/ML
0.2 INJECTION, SOLUTION INTRAMUSCULAR; INTRAVENOUS; SUBCUTANEOUS EVERY 5 MIN PRN
Status: DISCONTINUED | OUTPATIENT
Start: 2024-04-16 | End: 2024-04-22

## 2024-04-16 RX ORDER — TOBRAMYCIN 1.2 G/30ML
INJECTION, POWDER, LYOPHILIZED, FOR SOLUTION INTRAVENOUS AS NEEDED
Status: DISCONTINUED | OUTPATIENT
Start: 2024-04-16 | End: 2024-04-16 | Stop reason: HOSPADM

## 2024-04-16 RX ORDER — OXYCODONE HYDROCHLORIDE 5 MG/1
10 TABLET ORAL EVERY 4 HOURS PRN
Status: DISCONTINUED | OUTPATIENT
Start: 2024-04-16 | End: 2024-04-19

## 2024-04-16 RX ORDER — NORETHINDRONE AND ETHINYL ESTRADIOL 0.5-0.035
KIT ORAL AS NEEDED
Status: DISCONTINUED | OUTPATIENT
Start: 2024-04-16 | End: 2024-04-16

## 2024-04-16 RX ADMIN — EPHEDRINE SULFATE 10 MG: 50 INJECTION, SOLUTION INTRAVENOUS at 13:07

## 2024-04-16 RX ADMIN — HYDROMORPHONE HYDROCHLORIDE 0.4 MG: 1 INJECTION, SOLUTION INTRAMUSCULAR; INTRAVENOUS; SUBCUTANEOUS at 14:36

## 2024-04-16 RX ADMIN — FOLIC ACID 1 MG: 1 TABLET ORAL at 08:56

## 2024-04-16 RX ADMIN — OXYCODONE HYDROCHLORIDE 10 MG: 5 TABLET ORAL at 08:56

## 2024-04-16 RX ADMIN — Medication 200 MCG: at 11:22

## 2024-04-16 RX ADMIN — ATORVASTATIN CALCIUM 40 MG: 40 TABLET, FILM COATED ORAL at 08:56

## 2024-04-16 RX ADMIN — HYDROMORPHONE HYDROCHLORIDE 0.4 MG: 1 INJECTION, SOLUTION INTRAMUSCULAR; INTRAVENOUS; SUBCUTANEOUS at 14:09

## 2024-04-16 RX ADMIN — PROPOFOL 20 MG: 10 INJECTION, EMULSION INTRAVENOUS at 13:27

## 2024-04-16 RX ADMIN — HYDROMORPHONE HYDROCHLORIDE 0.4 MG: 1 INJECTION, SOLUTION INTRAMUSCULAR; INTRAVENOUS; SUBCUTANEOUS at 13:59

## 2024-04-16 RX ADMIN — PROPOFOL 20 MG: 10 INJECTION, EMULSION INTRAVENOUS at 13:35

## 2024-04-16 RX ADMIN — MIDAZOLAM HYDROCHLORIDE 2 MG: 1 INJECTION, SOLUTION INTRAMUSCULAR; INTRAVENOUS at 10:51

## 2024-04-16 RX ADMIN — PROPOFOL 20 MG: 10 INJECTION, EMULSION INTRAVENOUS at 13:33

## 2024-04-16 RX ADMIN — OXYCODONE HYDROCHLORIDE 10 MG: 5 TABLET ORAL at 21:23

## 2024-04-16 RX ADMIN — HYDROMORPHONE HYDROCHLORIDE 0.4 MG: 1 INJECTION, SOLUTION INTRAMUSCULAR; INTRAVENOUS; SUBCUTANEOUS at 14:30

## 2024-04-16 RX ADMIN — GABAPENTIN 300 MG: 300 CAPSULE ORAL at 21:23

## 2024-04-16 RX ADMIN — SODIUM CHLORIDE 1000 ML: 9 INJECTION, SOLUTION INTRAVENOUS at 00:08

## 2024-04-16 RX ADMIN — HYDROMORPHONE HYDROCHLORIDE 0.4 MG: 1 INJECTION, SOLUTION INTRAMUSCULAR; INTRAVENOUS; SUBCUTANEOUS at 14:18

## 2024-04-16 RX ADMIN — LIDOCAINE HYDROCHLORIDE 70 MG: 20 INJECTION, SOLUTION INFILTRATION; PERINEURAL at 10:52

## 2024-04-16 RX ADMIN — SODIUM CHLORIDE, POTASSIUM CHLORIDE, SODIUM LACTATE AND CALCIUM CHLORIDE 50 ML/HR: 600; 310; 30; 20 INJECTION, SOLUTION INTRAVENOUS at 04:04

## 2024-04-16 RX ADMIN — PROPOFOL 20 MG: 10 INJECTION, EMULSION INTRAVENOUS at 13:29

## 2024-04-16 RX ADMIN — ROCURONIUM BROMIDE 60 MG: 10 INJECTION INTRAVENOUS at 10:52

## 2024-04-16 RX ADMIN — CEFAZOLIN 2 G: 1 INJECTION, POWDER, FOR SOLUTION INTRAMUSCULAR; INTRAVENOUS at 11:00

## 2024-04-16 RX ADMIN — SODIUM CHLORIDE, POTASSIUM CHLORIDE, SODIUM LACTATE AND CALCIUM CHLORIDE 100 ML/HR: 600; 310; 30; 20 INJECTION, SOLUTION INTRAVENOUS at 14:10

## 2024-04-16 RX ADMIN — HYDROMORPHONE HYDROCHLORIDE 0.4 MG: 1 INJECTION, SOLUTION INTRAMUSCULAR; INTRAVENOUS; SUBCUTANEOUS at 14:47

## 2024-04-16 RX ADMIN — SODIUM CHLORIDE, SODIUM LACTATE, POTASSIUM CHLORIDE, AND CALCIUM CHLORIDE: 600; 310; 30; 20 INJECTION, SOLUTION INTRAVENOUS at 10:45

## 2024-04-16 RX ADMIN — ONDANSETRON 4 MG: 2 INJECTION INTRAMUSCULAR; INTRAVENOUS at 13:30

## 2024-04-16 RX ADMIN — ENOXAPARIN SODIUM 40 MG: 100 INJECTION SUBCUTANEOUS at 18:30

## 2024-04-16 RX ADMIN — ACETAMINOPHEN 975 MG: 325 TABLET ORAL at 06:13

## 2024-04-16 RX ADMIN — ASPIRIN 81 MG 81 MG: 81 TABLET ORAL at 08:56

## 2024-04-16 RX ADMIN — EPHEDRINE SULFATE 10 MG: 50 INJECTION, SOLUTION INTRAVENOUS at 11:32

## 2024-04-16 RX ADMIN — PROPOFOL 20 MG: 10 INJECTION, EMULSION INTRAVENOUS at 13:37

## 2024-04-16 RX ADMIN — Medication 0.4 MCG/KG/MIN: at 11:42

## 2024-04-16 RX ADMIN — AMLODIPINE BESYLATE 10 MG: 10 TABLET ORAL at 08:56

## 2024-04-16 RX ADMIN — FENTANYL CITRATE 50 MCG: 50 INJECTION INTRAMUSCULAR; INTRAVENOUS at 00:08

## 2024-04-16 RX ADMIN — OXYCODONE HYDROCHLORIDE 5 MG: 5 TABLET ORAL at 04:04

## 2024-04-16 RX ADMIN — PROPOFOL 80 MG: 10 INJECTION, EMULSION INTRAVENOUS at 10:52

## 2024-04-16 RX ADMIN — CEFAZOLIN SODIUM 2 G: 2 INJECTION, SOLUTION INTRAVENOUS at 21:30

## 2024-04-16 RX ADMIN — Medication 200 MCG: at 11:36

## 2024-04-16 RX ADMIN — ACETAMINOPHEN 975 MG: 325 TABLET ORAL at 18:30

## 2024-04-16 RX ADMIN — Medication 2000 UNITS: at 08:56

## 2024-04-16 RX ADMIN — GABAPENTIN 300 MG: 300 CAPSULE ORAL at 08:56

## 2024-04-16 RX ADMIN — EPHEDRINE SULFATE 10 MG: 50 INJECTION, SOLUTION INTRAVENOUS at 12:05

## 2024-04-16 RX ADMIN — Medication 160 MCG: at 11:07

## 2024-04-16 RX ADMIN — FENTANYL CITRATE 100 MCG: 50 INJECTION, SOLUTION INTRAMUSCULAR; INTRAVENOUS at 10:52

## 2024-04-16 RX ADMIN — PROPOFOL 20 MG: 10 INJECTION, EMULSION INTRAVENOUS at 13:31

## 2024-04-16 RX ADMIN — HYDROMORPHONE HYDROCHLORIDE 0.4 MG: 1 INJECTION, SOLUTION INTRAMUSCULAR; INTRAVENOUS; SUBCUTANEOUS at 14:04

## 2024-04-16 RX ADMIN — TRANEXAMIC ACID 1000 MG: 100 INJECTION, SOLUTION INTRAVENOUS at 11:00

## 2024-04-16 RX ADMIN — SUGAMMADEX 200 MG: 100 INJECTION, SOLUTION INTRAVENOUS at 13:37

## 2024-04-16 RX ADMIN — EPHEDRINE SULFATE 10 MG: 50 INJECTION, SOLUTION INTRAVENOUS at 11:17

## 2024-04-16 RX ADMIN — EPHEDRINE SULFATE 10 MG: 50 INJECTION, SOLUTION INTRAVENOUS at 11:14

## 2024-04-16 SDOH — SOCIAL STABILITY: SOCIAL INSECURITY: DOES ANYONE TRY TO KEEP YOU FROM HAVING/CONTACTING OTHER FRIENDS OR DOING THINGS OUTSIDE YOUR HOME?: NO

## 2024-04-16 SDOH — SOCIAL STABILITY: SOCIAL INSECURITY: HAVE YOU HAD THOUGHTS OF HARMING ANYONE ELSE?: NO

## 2024-04-16 SDOH — SOCIAL STABILITY: SOCIAL INSECURITY: ARE THERE ANY APPARENT SIGNS OF INJURIES/BEHAVIORS THAT COULD BE RELATED TO ABUSE/NEGLECT?: NO

## 2024-04-16 SDOH — SOCIAL STABILITY: SOCIAL INSECURITY: HAS ANYONE EVER THREATENED TO HURT YOUR FAMILY OR YOUR PETS?: NO

## 2024-04-16 SDOH — SOCIAL STABILITY: SOCIAL INSECURITY: DO YOU FEEL UNSAFE GOING BACK TO THE PLACE WHERE YOU ARE LIVING?: NO

## 2024-04-16 SDOH — SOCIAL STABILITY: SOCIAL INSECURITY: ARE YOU OR HAVE YOU BEEN THREATENED OR ABUSED PHYSICALLY, EMOTIONALLY, OR SEXUALLY BY ANYONE?: NO

## 2024-04-16 SDOH — SOCIAL STABILITY: SOCIAL INSECURITY: ABUSE: ADULT

## 2024-04-16 SDOH — SOCIAL STABILITY: SOCIAL INSECURITY: DO YOU FEEL ANYONE HAS EXPLOITED OR TAKEN ADVANTAGE OF YOU FINANCIALLY OR OF YOUR PERSONAL PROPERTY?: NO

## 2024-04-16 ASSESSMENT — PAIN SCALES - GENERAL
PAINLEVEL_OUTOF10: 9
PAINLEVEL_OUTOF10: 10 - WORST POSSIBLE PAIN
PAINLEVEL_OUTOF10: 8
PAINLEVEL_OUTOF10: 7
PAINLEVEL_OUTOF10: 8
PAINLEVEL_OUTOF10: 10 - WORST POSSIBLE PAIN
PAINLEVEL_OUTOF10: 10 - WORST POSSIBLE PAIN
PAINLEVEL_OUTOF10: 8
PAINLEVEL_OUTOF10: 10 - WORST POSSIBLE PAIN
PAINLEVEL_OUTOF10: 8
PAINLEVEL_OUTOF10: 10 - WORST POSSIBLE PAIN

## 2024-04-16 ASSESSMENT — PAIN - FUNCTIONAL ASSESSMENT
PAIN_FUNCTIONAL_ASSESSMENT: 0-10

## 2024-04-16 ASSESSMENT — ACTIVITIES OF DAILY LIVING (ADL)
BATHING: NEEDS ASSISTANCE
ASSISTIVE_DEVICE: CANE;WALKER
DRESSING YOURSELF: NEEDS ASSISTANCE
WALKS IN HOME: NEEDS ASSISTANCE
ADEQUATE_TO_COMPLETE_ADL: YES
PATIENT'S MEMORY ADEQUATE TO SAFELY COMPLETE DAILY ACTIVITIES?: YES
JUDGMENT_ADEQUATE_SAFELY_COMPLETE_DAILY_ACTIVITIES: YES
TOILETING: NEEDS ASSISTANCE
FEEDING YOURSELF: INDEPENDENT
LACK_OF_TRANSPORTATION: NO
HEARING - LEFT EAR: FUNCTIONAL
GROOMING: NEEDS ASSISTANCE
HEARING - RIGHT EAR: FUNCTIONAL

## 2024-04-16 ASSESSMENT — PAIN DESCRIPTION - LOCATION
LOCATION: LEG

## 2024-04-16 ASSESSMENT — COGNITIVE AND FUNCTIONAL STATUS - GENERAL
MOBILITY SCORE: 18
DRESSING REGULAR UPPER BODY CLOTHING: A LITTLE
TOILETING: A LITTLE
HELP NEEDED FOR BATHING: A LITTLE
WALKING IN HOSPITAL ROOM: TOTAL
PATIENT BASELINE BEDBOUND: NO
DRESSING REGULAR LOWER BODY CLOTHING: A LITTLE
DAILY ACTIVITIY SCORE: 20
CLIMB 3 TO 5 STEPS WITH RAILING: TOTAL

## 2024-04-16 ASSESSMENT — PATIENT HEALTH QUESTIONNAIRE - PHQ9
2. FEELING DOWN, DEPRESSED OR HOPELESS: NOT AT ALL
1. LITTLE INTEREST OR PLEASURE IN DOING THINGS: NOT AT ALL
SUM OF ALL RESPONSES TO PHQ9 QUESTIONS 1 & 2: 0

## 2024-04-16 ASSESSMENT — PAIN DESCRIPTION - DESCRIPTORS
DESCRIPTORS: ACHING;DISCOMFORT;THROBBING
DESCRIPTORS: ACHING;DISCOMFORT

## 2024-04-16 ASSESSMENT — PAIN DESCRIPTION - ORIENTATION
ORIENTATION: LEFT

## 2024-04-16 ASSESSMENT — ENCOUNTER SYMPTOMS
VOMITING: 0
CHEST TIGHTNESS: 0
WEAKNESS: 0
SHORTNESS OF BREATH: 0
NAUSEA: 0
DIZZINESS: 0
FEVER: 0
FATIGUE: 0
PALPITATIONS: 0

## 2024-04-16 ASSESSMENT — LIFESTYLE VARIABLES
SKIP TO QUESTIONS 9-10: 1
SUBSTANCE_ABUSE_PAST_12_MONTHS: NO
HOW OFTEN DO YOU HAVE A DRINK CONTAINING ALCOHOL: NEVER
HOW OFTEN DO YOU HAVE 6 OR MORE DRINKS ON ONE OCCASION: NEVER
PRESCIPTION_ABUSE_PAST_12_MONTHS: NO
AUDIT-C TOTAL SCORE: 0
AUDIT-C TOTAL SCORE: 0
HOW MANY STANDARD DRINKS CONTAINING ALCOHOL DO YOU HAVE ON A TYPICAL DAY: PATIENT DOES NOT DRINK
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
## 2024-04-16 NOTE — OP NOTE
ORTHOPAEDIC SURGERY OPERATIVE REPORT      Date of Surgery: 4/16/2024    Surgeon: Barrera Rodriguez MD    Assistant Surgeon: MD Dr. Alexys Eckert participated in this case as the assistant surgeon, performing components of the positioning, approach, debridement, reduction, fixation, and closure. Due to the nature and complexity of the case, no qualified resident of an appropriate level was available to assist.    Assistants:  Jessica Nation, PGY1    Preoperative Diagnosis:  Left T-type intra-articular distal femur fracture    Postoperative Diagnosis:  Left T-type intra-articular distal femur fracture    Procedures Performed:  Left intra-articular distal femur fracture open reduction internal fixation  Left distal femoral shaft fracture retrograde intramedullary nailing  Application of traction    Anesthesia: General anesthesia    IV Fluids: Per anesthesia record    Tourniquet Time: None    Estimated Blood Loss: 250 mL    Complications: None    Implants:   [place] T2 Alpha 10 x 340 mm retrograde femoral nail with associated interlocking screws  Synthes 4.0 mm headless compression screws x2  Rc bone graft 5cc    Specimens: None    Intraoperative Findings: Stable fracture fixation and safe extra-articular hardware placement noted at the conclusion of the case.    Indications For Procedure:  Nuris Squires is a 58 y.o. female who sustained mechanical ground-level fall and sustained an injury to her left knee.  Upon presentation, the patient was identified as having a left intra-articular displaced fracture as well as left distal femoral shaft fracture . Due to the nature of the patient's injury, they were indicated for operative stabilization.  Informed consent was obtained after discussing the risks, benefits, and alternatives to the procedure.  Risks include pain, bleeding, infection, damage to nearby structures, malunion, nonunion, hardware complications, need for further procedures, blood clots,  anesthesia risks, and death.  Decision was made to proceed.  The patient was then brought to the preoperative holding area on the day of surgery.    Procedure Detail:  The patient was met in the preoperative holding area where their identity was confirmed and the operative extremity was marked. The patient was taken back to the operating theater where a preinduction timeout was performed which confirmed the patient's identity, procedure to be performed, correct operative site, availability of imaging and implants, allergies, and preoperative antibiotics. Everyone present was in agreement. They were placed under general anesthesia without complication. The patient was transferred to the operating table and placed in the supine position. All bony prominences were well-padded. The patient's left lower extremity was then prepped and draped in the usual sterile fashion. A preprocedure timeout was performed which again confirmed the patient's identity, procedure to be performed, and correct operative site.  Everyone present was in agreement. Preoperative antibiotics were administered.    We began by marking out an essentially a direct anterior approach to the knee.  Incision was made through skin and subcutaneous tissue just to the medial side of midline.  The capsule and retinaculum were identified and we made the medial parapatellar arthrotomy and extended this proximally and distally, taking care to avoid injury to the underlying meniscus and articular surface.  Once we have done this, we were able to sublux the patella laterally and exposed the distal femur articular surface.  We did note that the patient had a significant amount of cartilage loss as result of degenerative wear.  The patient had essentially no ACL remaining.  The patient's menisci did appear intact.  We identified the intra-articular split and removed a small piece of cartilage which was devitalized approximately 2 x 5 mm.  We temporarily booked open  the fracture with the use of a lamina  and debrided the fracture site with the use of a dental pick, curette, and irrigation.  Once I done this, we used a large pointed duction clamp within the incision and used this to compress the articular surface.  We supplemented this with the use of additional small point reduction clamp.  Once this was performed, we provisionally stabilized this with the placement of multiple 2.0 mm Steinmann pins.  Following this, we placed a percutaneous guidewire for a partially-threaded headless compression screw.  This was placed in a relatively posterior position, just proximal to Blumensaat's line, in order to avoid our nail trajectory.  Fluoroscopy demonstrated appropriate wire position.  This was advanced and measured.  We then overdrilled the near cortex and placed a Synthes 4.0 mm headless compression screw over top of the wire.  This was fully advanced first with power and then by hand.  This had satisfactory purchase and was noted to compress the joint surface quite well.  At this point, we placed additional 2.0 mm wire is very anteriorly in order to prevent blocking our nail trajectory and removed our initially placed wires.  We then utilized our medial parapatellar arthrotomy and placed a guidewire in an appropriate starting position as confirmed with AP and lateral fluoroscopic views.  Opening reamer was utilized over top of this.  We took a ball-tipped guidewire and advanced this up to the  lesser  trochanter.  This measured about 350 mm and elected to use a 340 mm nail.  We began sequentially reaming up to a size 11.5 mm reamer which had appropriate endosteal chatter.  We selected our [place] T2 alpha 10 x 340 mm retrograde femoral nail and inserted this over the guidewire.  This was fully seated with the use of a mallet.  We placed 4 distal interlocking screws through the nail using the aiming jig.  We elected to use advanced screws for all of these given the  patient's poor bone quality.  Following this, we used the proximal aiming arm and placed 2 anterior to posterior interlocking screws.  These were placed bicortically and had excellent purchase.  We removed the previously placed 2.0 mm wires and placed an additional 4.0 mm headless compression screw posterior to our nail.  We did note that the nail was in the relatively anterior position in the distal segment given some anterior bone loss.  We did note clinically that there was some anterior bone loss as well.  We did plan to fill this with bone graft to help with bony consolidation.  The traction weights were removed and a setscrew was applied.  The traction pin was removed and we copiously irrigated the incisions with normal saline.  Vancomycin and tobramycin powder was placed within the arthrotomy.  5 cc of Rc bone graft was then applied into the anterior bony void and we obtained our final fluoroscopic images which demonstrated maintained satisfactory fracture reduction as well as safe hardware placement as well as appropriate filling of the anterior bony defect.  The arthrotomy was closed using a running 0 PDS suture.  The deep dermal layer of all incisions was closed using 2-0 Monocryl followed by staples for the skin.  All counts were correct x 2 at this time.  Sterile dressings were applied.    The drapes were taken down and the patient was awoken from anesthesia without complication. They were transferred back to their hospital bed and taken to PACU in stable condition.    Postoperative Plan:  The patient will be nonweightbearing to the left lower extremity at this time, given the intra-articular nature of the fracture.  The patient may perform knee range of motion as tolerated.  Patient will receive postoperative antibiotics.  Patient may resume DVT prophylaxis per the primary team, we would recommend at least aspirin 81 mg twice daily.  Patient will receive evaluations by PT and OT. The patient will  follow up with Dr. Barrera Rodriguez MD in approximately 3 weeks time for clinical check, suture removal, and 2 view xrays of the left knee (AP/lateral) and 2 view x-rays of the left femur (AP/lateral) at that time.      Dr. Barrera Rodriguez MD was present for the critical portions of the case and was otherwise immediately available to assist.      Jerry Reardon MD  Orthopaedic Trauma Fellow  4/16/2024

## 2024-04-16 NOTE — PROGRESS NOTES
[facility]  TRAUMA SERVICE - PROGRESS NOTE    Patient Name: Nuris Squires  MRN: [id number]  Admit Date: 415  : 1965  AGE: 58 y.o.   GENDER: female  ==============================================================================  MECHANISM OF INJURY:   57 y/o F s/p mGLF onto L knee. Pt states she was cooking on the stove and twisted her knee ultimately falling onto her left knee. She denies dizziness, lightheadedness, or LOC at time of event. She does endorse falling more often, last fall about a month ago. She lives at home with boyfriend. She denies chest pain and SOB at time of exam. Pt has a pmh of DM, HTN. Pt is not on blood thinners.     LOC (yes/no?): No  Anticoagulant / Anti-platelet Rx? (for what dx?): No  Referring Facility Name (N/A for scene EMR run): N/A    INJURIES:   Comminuted and moderately displaced intra-articular fracture of the L distal femur     OTHER MEDICAL PROBLEMS:  HTN  DM    INCIDENTAL FINDINGS:  None    PROCEDURES:  : L femur ORIF    ==============================================================================  TODAY'S ASSESSMENT AND PLAN OF CARE:    #Comminuted and moderately displaced intra-articular fracture of the L distal femur    -->Ortho Consult, recs       -OR today with Ortho for ORIF of L femur       -NWB LLE  -Pain control: tylenol scheduled, prn oxy 5/10  -PT/OT post op  -RCRI: class 1 risk     ##Comorbids  -Med rec complete   -Cont home meds as appropriate, on amlodipine, aspirin, atorvastatin, clonidine, gabapentin, and sliding scale insulin    #FEN//GI  -voiding freely  -on Miramax  -diabetic regular diet     #DVT ppx  -LVX 40 mg BID (Crcl 72, BMI 41)  -SCD to right leg     Dispo: Continue RNF care      Pt discussed with Dr. Mascorro    Total face to face time spent with patient/family of 20 minutes, with >50% of the time spent discussing plan of care/management, counseling/educating on disease processes, explaining results  "of diagnostic testing.      Robert Galindo PA-C  Trauma Surgery  [id number]       ==============================================================================  CHIEF COMPLAINT / OVERNIGHT EVENTS:   Patient was seen laying in bed appearing comfortable. Patient denies any new complaints at this time. Patient denies any chest pain, SOB, nausea, vomiting, change in Bms, dysuria.     MEDICAL HISTORY / ROS:  Admission history and ROS reviewed. Pertinent changes as follows:      PHYSICAL EXAM:  Heart Rate:  [65-92]   Temp:  [36 °C (96.8 °F)-37 °C (98.6 °F)]   Resp:  [12-20]   BP: (103-170)/(52-85)   Height:  [157.5 cm (5' 2\")]   Weight:  [102 kg (225 lb)]   SpO2:  [93 %-98 %]   Physical Exam  Constitutional:       Appearance: Normal appearance.   HENT:      Head: Normocephalic and atraumatic.      Right Ear: External ear normal.      Left Ear: External ear normal.      Nose: Nose normal.   Eyes:      Pupils: Pupils are equal, round, and reactive to light.   Cardiovascular:      Rate and Rhythm: Normal rate and regular rhythm.      Pulses: Normal pulses.      Heart sounds: Normal heart sounds.   Pulmonary:      Effort: Pulmonary effort is normal.      Breath sounds: Normal breath sounds.   Abdominal:      General: Bowel sounds are normal. There is no distension.      Palpations: Abdomen is soft.      Tenderness: There is no abdominal tenderness.   Musculoskeletal:      Cervical back: Normal range of motion and neck supple.      Comments: TTP of left femur, LLE splinted    Skin:     General: Skin is warm and dry.      Capillary Refill: Capillary refill takes less than 2 seconds.   Neurological:      Mental Status: She is alert and oriented to person, place, and time.   Psychiatric:         Mood and Affect: Mood normal.         Behavior: Behavior normal.         IMAGING SUMMARY:  (summary of new imaging findings, not a copy of dictation)  No new imaging    LABS:  Results from last 7 days   Lab Units 04/16/24  0637 04/15/24  2128 "   WBC AUTO x10*3/uL 9.2 9.6   HEMOGLOBIN g/dL 8.7* 10.6*   HEMATOCRIT % 27.2* 31.6*   PLATELETS AUTO x10*3/uL 256 318   NEUTROS PCT AUTO %  --  75.7   LYMPHS PCT AUTO %  --  18.3   MONOS PCT AUTO %  --  4.5   EOS PCT AUTO %  --  0.6     Results from last 7 days   Lab Units 04/15/24  2128   APTT seconds 31   INR  0.9     Results from last 7 days   Lab Units 04/15/24  2128   SODIUM mmol/L 136   POTASSIUM mmol/L 4.2   CHLORIDE mmol/L 103   CO2 mmol/L 26   BUN mg/dL 30*   CREATININE mg/dL 1.36*   CALCIUM mg/dL 8.9   PROTEIN TOTAL g/dL 6.9   BILIRUBIN TOTAL mg/dL 0.4   ALK PHOS U/L 120*   ALT U/L 23   AST U/L 22   GLUCOSE mg/dL 399*     Results from last 7 days   Lab Units 04/15/24  2128   BILIRUBIN TOTAL mg/dL 0.4           I have reviewed all medications, laboratory results, and imaging pertinent for today's encounter.

## 2024-04-16 NOTE — H&P
[facility]  TRAUMA SERVICE - HISTORY AND PHYSICAL / CONSULT    Patient Name: Nuris Squires  MRN: [id number]  Admit Date: 415  : 1965  AGE: 58 y.o.   GENDER: female  ==============================================================================  MECHANISM OF INJURY / CHIEF COMPLAINT:   59 y/o F s/p mGLF onto L knee. Pt states she was cooking on the stove and twisted her knee ultimately falling onto her left knee. She denies dizziness, lightheadedness, or LOC at time of event. She does endorse falling more often, last fall about a month ago. She lives at home with boyfriend. She denies chest pain and SOB at time of exam. Pt has a pmh of DM, HTN. Pt is not on blood thinners.     LOC (yes/no?): No  Anticoagulant / Anti-platelet Rx? (for what dx?): No  Referring Facility Name (N/A for scene EMR run): N/A    INJURIES:   Comminuted and moderately displaced intra-articular fracture of the L distal femur     OTHER MEDICAL PROBLEMS:  HTN  DM    INCIDENTAL FINDINGS:  None    ==============================================================================  ADMISSION PLAN OF CARE:    #Comminuted and moderately displaced intra-articular fracture of the L distal femur    -->Ortho Consult, recs       -Keep NPO       -OR  for fixation       -NWB LLE  -Periop med consult  -Pain control  -PT/OT post op  -RCRI: class 1 risk    ##Comorbids  -Med rec complete   -Cont home meds as appropriate     #DVT ppx  -LVX 40 mg BID (Crcl 72, BMI 41)  -SCD to right leg    Dispo: Admit to RNF    Pt discussed with Dr. Uri Desouza, PADonnC  Trauma Surgery  [id number]    ==============================================================================  PAST MEDICAL HISTORY:   PMH:   Past Medical History:   Diagnosis Date    Personal history of other diseases of the circulatory system     History of hypertension    Personal history of other endocrine, nutritional and metabolic disease     History of  hyperlipidemia    Personal history of other endocrine, nutritional and metabolic disease     History of diabetes mellitus     Last menstrual period: N/A    PSH:   Past Surgical History:   Procedure Laterality Date    CT ANGIO NECK  1/25/2023    CT NECK ANGIO W AND WO IV CONTRAST 1/25/2023 DOCTOR OFFICE LEGACY    CT HEAD ANGIO W AND WO IV CONTRAST  1/25/2023    CT HEAD ANGIO W AND WO IV CONTRAST 1/25/2023 DOCTOR OFFICE LEGACY    OTHER SURGICAL HISTORY  10/21/2020    Toe amputation     FH:   Family History   Problem Relation Name Age of Onset    Alzheimer's disease Mother      Diabetes Mother      Lung cancer Mother      Diabetes Father      Lung cancer Father      Pancreatic cancer Father      Diabetes Sister      Lung cancer Sister       SOCIAL HISTORY:    Smoking:   Social History     Tobacco Use   Smoking Status Never    Passive exposure: Never   Smokeless Tobacco Never       Alcohol:   Social History     Substance and Sexual Activity   Alcohol Use Never       Drug use: Denies    MEDICATIONS:   Prior to Admission medications    Medication Sig Start Date End Date Taking? Authorizing Provider   acetaminophen (Tylenol) 325 mg tablet Take 2 tablets (650 mg) by mouth every 4 hours if needed for mild pain (1 - 3). 1/18/24   TAL Santana-CNP   amLODIPine (Norvasc) 10 mg tablet Take 1 tablet (10 mg) by mouth once daily. 3/25/24   Mone Aquino MD   aspirin 81 mg chewable tablet Chew 1 tablet (81 mg) once daily. 2/27/24   Mone Aquino MD   atorvastatin (Lipitor) 40 mg tablet TAKE ONE TABLET BY MOUTH EVERY DAY 3/2/24   Mone Aquino MD   blood-glucose sensor (FreeStyle Remington 3 Sensor) device Use to monitor blood glucose. Change sensor every 14 days. 2/1/24   Mone Aquino MD   cholecalciferol (Vitamin D-3) 50 mcg (2,000 unit) capsule Take 1 capsule (50 mcg) by mouth once daily. 2/27/24   Mone Aquino MD   cloNIDine (Catapres) 0.1 mg tablet Take 1 tablet (0.1 mg) by mouth 2 times a day. 2/27/24    "Mone Aquino MD   FreeStyle Remington 3 [place] misc Use as instructed to monitor blood glucose. 2/1/24   Mone Aquino MD   gabapentin (Neurontin) 300 mg capsule TAKE ONE CAPSULE BY MOUTH TWO TIMES A DAY 3/2/24   Mone Aquino MD   heparin flush (heparin LockFlush,Porcine,,PF,) 10 unit/mL injection Infuse 5 mL (50 Units) into a venous catheter once daily.    Historical Provider, MD   insulin glargine (Basaglar KwikPen U-100 Insulin) 100 unit/mL (3 mL) pen Inject 12 Units under the skin once daily at bedtime. Take as directed per insulin instructions.    Historical Provider, MD   insulin lispro (HumaLOG) 100 unit/mL injection Inject 0.1 mL (10 Units) under the skin 3 times a day with meals. 2/27/24   Mone Aquino MD   meclizine (Antivert) 25 mg tablet,chewable Chew 1 tablet (25 mg) 3 times a day as needed (vertigo). 9/14/23   MD Grace Lunauch Delica Plus Lancet 30 gauge misc USE TO TEST BLOOD SUGAR AS DIRECTED 2/27/24   Mone Aquino MD   OneTouch Ultra2 Meter misc use 1 to 2 times daily 2/27/24   MD Grace Lunauch Verio test strips strip USE TO CHECK BLOOD SUGAR AS DIRECTED 1-2 times daily 3/25/24   Mone Aquino MD   pen needle, diabetic 32 gauge x 5/32\" needle Use four times a day with insulin use 3/25/24   Mone Aquino MD   sodium chloride 0.9% (sodium chloride) flush Infuse 10 mL into a venous catheter once daily.    Historical Provider, MD     ALLERGIES:   No Known Allergies    REVIEW OF SYSTEMS:  Review of Systems   Constitutional:  Negative for fatigue and fever.   Respiratory:  Negative for chest tightness and shortness of breath.    Cardiovascular:  Negative for chest pain and palpitations.   Gastrointestinal:  Negative for nausea and vomiting.   Musculoskeletal:         LLE pain   Neurological:  Negative for dizziness and weakness.     PHYSICAL EXAM:  PRIMARY SURVEY:  Primary Survey  SECONDARY SURVEY/PHYSICAL EXAM:  Physical Exam  Constitutional:       Appearance: " Normal appearance.   HENT:      Head: Normocephalic and atraumatic.      Right Ear: External ear normal.      Left Ear: External ear normal.      Mouth/Throat:      Mouth: Mucous membranes are moist.      Pharynx: Oropharynx is clear.   Eyes:      Extraocular Movements: Extraocular movements intact.      Pupils: Pupils are equal, round, and reactive to light.   Cardiovascular:      Rate and Rhythm: Normal rate and regular rhythm.      Pulses: Normal pulses.      Heart sounds: Normal heart sounds.   Pulmonary:      Effort: Pulmonary effort is normal.      Breath sounds: Normal breath sounds.   Abdominal:      General: Abdomen is flat. Bowel sounds are normal.      Palpations: Abdomen is soft.   Musculoskeletal:         General: Signs of injury present.      Comments: LLE in brace    Skin:     General: Skin is warm.      Capillary Refill: Capillary refill takes less than 2 seconds.   Neurological:      General: No focal deficit present.      Mental Status: She is alert and oriented to person, place, and time.       IMAGING SUMMARY:   CT Knee left: Acute, traumatic, significantly comminuted and moderately displaced intra-articular fracture of the distal femur.    LABS:  Results for orders placed or performed during the hospital encounter of 04/15/24 (from the past 24 hour(s))   CBC and Auto Differential   Result Value Ref Range    WBC 9.6 4.4 - 11.3 x10*3/uL    nRBC 0.0 0.0 - 0.0 /100 WBCs    RBC 3.70 (L) 4.00 - 5.20 x10*6/uL    Hemoglobin 10.6 (L) 12.0 - 16.0 g/dL    Hematocrit 31.6 (L) 36.0 - 46.0 %    MCV 85 80 - 100 fL    MCH 28.6 26.0 - 34.0 pg    MCHC 33.5 32.0 - 36.0 g/dL    RDW 12.5 11.5 - 14.5 %    Platelets 318 150 - 450 x10*3/uL    Neutrophils % 75.7 40.0 - 80.0 %    Immature Granulocytes %, Automated 0.4 0.0 - 0.9 %    Lymphocytes % 18.3 13.0 - 44.0 %    Monocytes % 4.5 2.0 - 10.0 %    Eosinophils % 0.6 0.0 - 6.0 %    Basophils % 0.5 0.0 - 2.0 %    Neutrophils Absolute 7.26 1.20 - 7.70 x10*3/uL    Immature  Granulocytes Absolute, Automated 0.04 0.00 - 0.70 x10*3/uL    Lymphocytes Absolute 1.76 1.20 - 4.80 x10*3/uL    Monocytes Absolute 0.43 0.10 - 1.00 x10*3/uL    Eosinophils Absolute 0.06 0.00 - 0.70 x10*3/uL    Basophils Absolute 0.05 0.00 - 0.10 x10*3/uL   Comprehensive metabolic panel   Result Value Ref Range    Glucose 399 (H) 74 - 99 mg/dL    Sodium 136 136 - 145 mmol/L    Potassium 4.2 3.5 - 5.3 mmol/L    Chloride 103 98 - 107 mmol/L    Bicarbonate 26 21 - 32 mmol/L    Anion Gap 11 10 - 20 mmol/L    Urea Nitrogen 30 (H) 6 - 23 mg/dL    Creatinine 1.36 (H) 0.50 - 1.05 mg/dL    eGFR 45 (L) >60 mL/min/1.73m*2    Calcium 8.9 8.6 - 10.6 mg/dL    Albumin 3.5 3.4 - 5.0 g/dL    Alkaline Phosphatase 120 (H) 33 - 110 U/L    Total Protein 6.9 6.4 - 8.2 g/dL    AST 22 9 - 39 U/L    Bilirubin, Total 0.4 0.0 - 1.2 mg/dL    ALT 23 7 - 45 U/L   Coagulation Screen   Result Value Ref Range    Protime 10.3 9.8 - 12.8 seconds    INR 0.9 0.9 - 1.1    aPTT 31 27 - 38 seconds   Type And Screen   Result Value Ref Range    ABO TYPE A     Rh TYPE POS     ANTIBODY SCREEN NEG    Human Chorionic Gonadotropin, Serum Quantitative   Result Value Ref Range    HCG, Beta-Quantitative 3 <5 mIU/mL   ECG 12 lead   Result Value Ref Range    Ventricular Rate 77 BPM    Atrial Rate 77 BPM    MD Interval 122 ms    QRS Duration 86 ms    QT Interval 352 ms    QTC Calculation(Bazett) 398 ms    P Axis 56 degrees    R Axis 85 degrees    T Axis 11 degrees    QRS Count 13 beats    Q Onset 220 ms    P Onset 159 ms    P Offset 211 ms    T Offset 396 ms    QTC Fredericia 382 ms       I have reviewed all laboratory and imaging results ordered/pertinent for this encounter.

## 2024-04-16 NOTE — PROGRESS NOTES
"Orthopaedic Surgery Progress Note    Subjective: Evaluated in PACU following surgery. Patient reports pain is well controlled. Denies chest pain, shortness of breath, or nausea. Feeling comfortable overall.    O:  /71   Pulse 82   Temp 36 °C (96.8 °F)   Resp 16   Ht 1.575 m (5' 2\")   Wt 102 kg (225 lb)   SpO2 97%   BMI 41.15 kg/m²     Gen: arousable, NAD, appropriately conversational  Cardiac: RRR to peripheral palpation  Resp: nonlabored on RA  GI: soft, nondistended    MSK:  LLE  - Surgical dressing c/d/i  - Fires DF/PF/EHL/FHL  - SILT in saph/sural/SPN/DPN distributions  - Foot warm, well perfused  - DP/PT pulse, brisk cap refill  - Compartments soft and compressible    Assessment:  Injury: L intra articular distal femur fx    58F (DM, obesity, charcot R foot sp ankle fusion) presents after mGLF, now s/p L femur ORIF and rIMN w/Dr. Rodriguez on 4/16.    Plan:  - Weightbearing Status: NWB LLE, ROMAT  - Pain: per primary  - Perioperative ABx: Ancef  - DVT PPx: SCDs, chemoprophylaxis per primary (recommend ASA enteric-coated 81 mg BID x 4 wks)  - Dressing: mepilex, remove POD7 (4/23)  - PT/OT    Jessica Nation, PGY-1  Orthopedic Surgery  [facility]    This patient will be followed by Ortho Trauma team (All chat preferred):    1st call: Jessica Nation PGY-1 or Lenny Salazar PGY-1  2nd call: Janes Jj PGY-2  3rd call: Valdo Gao, PGY-3    On weekends and after 6PM:  At [facility]: Please reach out to the orthopaedic on-call resident ([phone])  At [facility]: Please reach out to the orthopaedic on-call PHYLLIS or resident (please refer to Qgenda)    "

## 2024-04-16 NOTE — ANESTHESIA PREPROCEDURE EVALUATION
"Patient: Nuris Squires    Procedure Information       Date: 04/16/24    Procedures:       Open Reduction Internal Fixation Femur (Left: Knee)      Insertion Intramedullary Nail Femur (Left: Knee)    Location: [facility]    Surgeons: Barrera Rodriguez MD          Closed fracture of left femur     Relevant Problems   Anesthesia (within normal limits)      Cardiac  ECHO 1/2024:  CONCLUSIONS:   1. Left ventricular systolic function is hyperdynamic with a 70-75% estimated ejection fraction.   2. RVSP within normal limits.     (+) Benign essential hypertension (Per patient home SBPs 140s-170s)   (+) Mixed hyperlipidemia   (+) Peripheral arterial disease (CMS-HCC)      Pulmonary   (+) Dyspnea on exertion   (+) History of home oxygen therapy (Admission for pneumonia and put on home O2 after discharge.  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  Home BG 200s-300s.  Per patient if BG gets in the low 200s she will feel \"shaky\".  1/2024 HgbA1c 13.)      Hematology   (+) Anemia      Musculoskeletal   (+) Chronic left-sided low back pain with left-sided sciatica      ID   (+) Dermatophytosis of nail   (+) Right foot infection       Clinical information reviewed:   Tobacco  Allergies  Meds   Med Hx  Surg Hx   Fam Hx  Soc Hx      Vitals:    04/16/24 0743   BP: 170/75   Pulse: 65   Resp: 16   Temp: 37 °C (98.6 °F)   SpO2: 96%       NPO Detail:  No data recorded     Physical Exam    Airway  Mallampati: III     Cardiovascular - normal exam   "   Dental - normal exam     Pulmonary - normal exam     Abdominal   (+) obese         Anesthesia Plan    History of general anesthesia?: yes  History of complications of general anesthesia?: no    ASA 3     general   (Verbal consent obtained for rescue postop peripheral nerve block in pacu)  Anesthetic plan and risks discussed with patient.  Use of blood products discussed with patient who consented to blood products.    Plan discussed with CAA.

## 2024-04-16 NOTE — CONSULTS
Orthopaedic Surgery Consult H&P    HPI:   Orthopaedic Problems/Injuries: L intra articular distal femur fx  Other Injuries: none    58F (DM, obesity, charcot R foot sp ankle fusion) presents after mGLF. Denies numbness, tingling, and open wounds on the affected limb.     PMH: per above/EMR  PSH: per above/EMR  SocHx: Non-contributory to this patient's acute orthopaedic problem.   FamHx:  Non-contributory to this patient's acute orthopaedic problem.   Allergies: Reviewed in EMR  Meds: Reviewed in EMR    ROS      - 14 point ROS negative except as above    Physical Exam:  Gen: AOx3, NAD  HEENT: normocephalic atraumatic  Psych: appropriate mood and affect  Resp: nonlabored breathing  Cardiac: Extremities WWP, RRR to peripheral palpation  Neuro: CN 2-12 grossly intact  Skin: no rashes    Left lower extremity:  - Skin intact  - Tender to palpation over left knee  - Fires EHL/DF/PF.  - Sensation intact to light touch in sural, saphenous, superficial/deep peroneal, tibial nerve distributions.  - 2+ DP pulse, < 2 seconds capillary refill.    A full secondary exam was performed and all relevant findings discussed and noted above.    Imaging:  AP and lateral radiographs of the left femur display:   Intra articular distal femur fx    CT left knee displays:   Acute, traumatic, significantly comminuted and moderately displaced  intra-articular fracture of the distal femur.    Assessment:  Injury: L intra articular distal femur fx  HPI: 58F (DM, obesity, charcot R foot sp ankle fusion) presents after mGLF. Closed, NVI. KI.    Plan:  Patient will require operative fixation.     Please document medical clearance for surgery when medically appropriate.    Recommendations:  - Consented and posted for ORIF vs rIMN L femur w Dr. Rodriguez on 4/16  - Please obtain pre-operative labs prior to transfer to floor: CXR, EKG, CBC, BMP, COAGS, T&S   - Non-weight bearing LLE  - NPO  - SCDs only, please hold chemo DVT ppx pending  - Analgesia per  primary    Miles Morgan MD  PGY-2 Orthopaedic Surgery  On-call Resident    This patient was seen and staffed within 30 minutes of initial consult.  _________________________________________________________  While admitted, this patient will be followed by the Ortho Trauma Team, available via Epic Chat weekdays 6a-6p. Please page [phone] on nights and weekends.    Ortho Trauma  First Call: Jessica Nation, PGY-1  First Call: Lenny Salazar, PGY-1  Second Call: Felice Jj, PGY-2  Third Call: Valdo Gao, PGY-3

## 2024-04-16 NOTE — SIGNIFICANT EVENT
RCRI 1, Class II Risk, 6.0% 30-day risk of death, MI, or cardiac arrest. Given the urgency of orthopedic surgical intervention and review of the above risk factors, there is no additional workup necessary from a trauma perspective. Final risk versus benefits of all surgical procedures is based on the surgical and the anesthesia teams' assessments. Patient is medically optimized for surgery from a trauma surgery perspective at this time.    Robert Galindo PA-C  Trauma Surgery  [id number]

## 2024-04-16 NOTE — PROGRESS NOTES
Pharmacy Medication History Review    Nuris Squires is a 58 y.o. female admitted for Closed fracture of left femur (Multi). Pharmacy reviewed the patient's [id number] medications and allergies for accuracy.    The list below reflects the updated PTA list. Comments regarding how patient may be taking medications differently can be found in the Admit Orders Activity  Prior to Admission Medications   Prescriptions Last Dose Informant Patient Reported? Taking?   FreeStyle Remington 3 [place] misc  Self No Yes   Sig: Use as instructed to monitor blood glucose.   OneTouch Delica Plus Lancet 30 gauge misc  Self No Yes   Sig: USE TO TEST BLOOD SUGAR AS DIRECTED   OneTouch Ultra2 Meter misc  Self No Yes   Sig: use 1 to 2 times daily   OneTouch Verio test strips strip  Self No Yes   Sig: USE TO CHECK BLOOD SUGAR AS DIRECTED 1-2 times daily   acetaminophen (Tylenol) 325 mg tablet  Self No Yes   Sig: Take 2 tablets (650 mg) by mouth every 4 hours if needed for mild pain (1 - 3).   amLODIPine (Norvasc) 10 mg tablet 4/15/2024 at morning Self No Yes   Sig: Take 1 tablet (10 mg) by mouth once daily.   aspirin 81 mg chewable tablet 4/15/2024 at morning Self No Yes   Sig: Chew 1 tablet (81 mg) once daily.   atorvastatin (Lipitor) 40 mg tablet 4/15/2024 at morning Self No Yes   Sig: TAKE ONE TABLET BY MOUTH EVERY DAY   b complex vitamins capsule 4/15/2024 at morning Self Yes Yes   Sig: Take 1 capsule by mouth once daily.   blood-glucose sensor (FreeStyle Remington 3 Sensor) device  Self No Yes   Sig: Use to monitor blood glucose. Change sensor every 14 days.   cholecalciferol (Vitamin D-3) 50 mcg (2,000 unit) capsule 4/15/2024 at morning Self No Yes   Sig: Take 1 capsule (50 mcg) by mouth once daily.   cloNIDine (Catapres) 0.1 mg tablet 4/15/2024 at morning Self No Yes   Sig: Take 1 tablet (0.1 mg) by mouth 2 times a day.   folic acid (Folvite) 1 mg tablet 4/15/2024 at morning Self Yes Yes   Sig: Take 1 tablet (1 mg) by mouth once  "daily.   gabapentin (Neurontin) 300 mg capsule 4/15/2024 at morning Self No Yes   Sig: TAKE ONE CAPSULE BY MOUTH TWO TIMES A DAY   insulin glargine (Basaglar KwikPen U-100 Insulin) 100 unit/mL (3 mL) pen 4/14/2024 at morning Self Yes Yes   Sig: Inject 12 Units under the skin once daily at bedtime. Take as directed per insulin instructions.   insulin lispro (HumaLOG) 100 unit/mL injection 4/15/2024 Self No Yes   Sig: Inject 0.1 mL (10 Units) under the skin 3 times a day with meals.   meclizine (Antivert) 25 mg tablet,chewable Not Taking Self No No   Sig: Chew 1 tablet (25 mg) 3 times a day as needed (vertigo).   Patient not taking: Reported on 4/16/2024   pen needle, diabetic 32 gauge x 5/32\" needle  Self No Yes   Sig: Use four times a day with insulin use      Facility-Administered Medications: None        The list below reflects the updated allergy list. Please review each documented allergy for additional clarification and justification.  Allergies  Reviewed by Lindsey Cowan PharmD on 4/16/2024   No Known Allergies         Patient accepts M2B at discharge. Pharmacy has been updated to [facility].    Sources used to complete the med history include   Patient Interview - good historian able to independently list out all medications and directions for administration  Admission MedRec Grid  O[facility]RS - gabapentin 300mg LF: 3/2/24, #180, 90DS  [facility] medication dispense report    Below are additional concerns with the patient's PTA list.  None    Lindsey Cowan PharmD   Transitions of Care Pharmacist  [facility]  Please reach out via Secure Chat for questions, or if no response call [facility] or [facility]    "

## 2024-04-16 NOTE — DISCHARGE INSTRUCTIONS
Activity Restrictions  1) No driving until further instructed by your orthopaedic physician, which will be addressed at your outpatient appointments.    2) No driving or operating heavy machinery while taking narcotic pain medication.    3) Weight bearing status --> no weightbearing on right leg.     Discharge Medications  You will be sent home with pain medications by your primary Medicine team. You should also take a medication to prevent blood clots for 4 weeks after surgery.    Wound care instructions:   1) Leave incision open to air. Let water run freely over incision when showering, do not scrub. Do not soak in pool or tub.    2) Call if any drainage after 7 days, increased redness/warmth/swelling at incision site, abnormal pain/tenderness of the extremity, abnormal swelling of the extremity that does not respond to elevation, SOB/chest pain.

## 2024-04-16 NOTE — PROGRESS NOTES
Physical Therapy                 Therapy Communication Note    Patient Name: Nuris Squires  MRN: [id number]  Today's Date: 4/16/2024     Discipline: Physical Therapy    Missed Visit Reason: Missed Visit Reason:  (OR 4/16. Will re-attempt PT Eval as appropriate.)    Missed Time: Attempt

## 2024-04-16 NOTE — PROGRESS NOTES
Pharmacy Admission Order Reconciliation Review    Nuris Squires is a 58 y.o. female admitted for Closed fracture of left femur (Multi). Pharmacy reviewed the patient's unreconciled admission medications.    Prior to admission medications that were reviewed and acted on by the pharmacist include:  Tylenol  B Complex  One touch test strips  Insulin Lispro  Insulin Basaglar  Meclizine   These medications have been reconciled.     Any other unreconcilied medications have been addressed and will be ordered or held by the patient's medical team. Medications addressed by the pharmacist may be added or changed by the patient's medical team at any time.    Charlene Shetty, PharmD  Transitions of Care Pharmacist  [facility]  Please reach out via Secure Chat for questions, or if no response call [phone]

## 2024-04-16 NOTE — ANESTHESIA POSTPROCEDURE EVALUATION
Patient: Nuris Squires    Procedure Summary       Date: 04/16/24 Room / Location: [facility] OR 01 / [facility]    Anesthesia Start: 1045 Anesthesia Stop: 1348    Procedures:       Open Reduction Internal Fixation Femur (Left: Knee)      Insertion Intramedullary Nail Femur (Left: Knee) Diagnosis:       Other closed fracture of left femur, unspecified portion of femur, initial encounter (Multi)      (Other closed fracture of left femur, unspecified portion of femur, initial encounter (Multi) [S72.8X2A])    Surgeons: Barrera Rodriguez MD Responsible Provider: Petra Kwon MD MPH    Anesthesia Type: general ASA Status: 3            Anesthesia Type: general    Vitals Value Taken Time   /60 04/16/24 1349   Temp 36.0 04/16/24 1349   Pulse 90 04/16/24 1349   Resp 17 04/16/24 1349   SpO2 100 04/16/24 1349       Anesthesia Post Evaluation    Patient location during evaluation: PACU  Patient participation: complete - patient participated  Level of consciousness: awake and responsive to verbal stimuli  Pain management: adequate  Airway patency: patent  Cardiovascular status: acceptable, hemodynamically stable and blood pressure returned to baseline  Respiratory status: spontaneous ventilation, acceptable and face mask  Hydration status: acceptable  Postoperative Nausea and Vomiting: none        There were no known notable events for this encounter.

## 2024-04-16 NOTE — CARE PLAN
Problem: Psychosocial Needs  Goal: Collaborate with me, my family, and caregiver to identify my specific goals  Recent Flowsheet Documentation  Taken 4/16/2024 0251 by Adriana Lester RN  Cultural Requests During Hospitalization: none  Spiritual Requests During Hospitalization: None   The patient's goals for the shift include      The clinical goals for the shift include      Over the shift, the patient did not make progress toward the following goals. Barriers to progression include . Recommendations to address these barriers include .

## 2024-04-16 NOTE — HOSPITAL COURSE
Nuris Squires is a 59 y/o F who had a mGLF onto her L knee on 4/15.  Pt states she was cooking on the stove and twisted her knee ultimately falling onto her left knee. She denies dizziness, lightheadedness, or LOC at time of event. She does endorse falling more often, last fall about a month ago. She lives at home with boyfriend. She denies chest pain and SOB at time of exam. Pt has a pmh of DM, HTN, IDDM, CKD, and Hypoxia on 2L O2 NC as needed at home. Imaging was obtained and reveled an isolated comminuted and moderately displaced intra-articular fracture of the Left distal femur for which the Orthopedic teams were consulted and are recommending surgery. Pt was admitted to the [facility] under trauma surgery. Pt was taken to the  OR on 04/16 for a Left femur IMN. Procedure was completed without complications, patient tolerated well and returned to trauma nursing floor after recovery in PACU. Pt. Will be NWB in her LLE, ok for ROMAT at knee, and will follow up with orthopedics in ~ 3 weeks. Pt. Was started on home medications. Pt has been tolerating an oral diet with adequate pain control on PO medications. Labs have remained acceptable. Upon evaluation by PT/OT pt has been recommended SNF. Pt. Lantus dosing was increased 4/22 secondary to elevated BGL.  Of note A1C >11. To require PCP follow up. Asymptomatic acute on chronic anemia due to post-operative blood loss led to no transfusion near discharge.

## 2024-04-16 NOTE — PROGRESS NOTES
Transitional Care Coordinator   Attempted to meet patient however she was in [place] OR. Will follow up at a later time.   Pattie Montenegro RN

## 2024-04-16 NOTE — PROGRESS NOTES
Occupational Therapy    Therapy Communication Note    Patient Name: Nuris Squires  MRN: [id number]  Today's Date: 4/16/2024     Missed Visit: Yes  Missed Visit Reason:  (plan for OR 4/16 will hold OT eval.)      04/16/24 at 7:48 AM   Pablito YUAN/L, OTJULIO  Rehab Office: [phone]

## 2024-04-16 NOTE — ANESTHESIA PROCEDURE NOTES
Airway  Date/Time: 4/16/2024 10:58 AM  Urgency: elective    Airway not difficult    Staffing  Performed: DAVEY   Authorized by: Petra Kwon MD MPH    Performed by: DAVEY Bustamante  Patient location during procedure: OR    Indications and Patient Condition  Indications for airway management: anesthesia  Spontaneous ventilation: present  Sedation level: deep  Preoxygenated: yes  Patient position: sniffing  Mask difficulty assessment: 1 - vent by mask    Final Airway Details  Final airway type: endotracheal airway      Successful airway: ETT  Cuffed: yes   Successful intubation technique: direct laryngoscopy  Facilitating devices/methods: cricoid pressure and intubating stylet  Endotracheal tube insertion site: oral  Blade: Lien  Blade size: #3  ETT size (mm): 7.0  Cormack-Lehane Classification: grade IIb - view of arytenoids or posterior of glottis only  Placement verified by: chest auscultation, capnometry and palpation of cuff   Measured from: lips  ETT to lips (cm): 21  Number of attempts at approach: 2  Ventilation between attempts: BVM    Additional Comments  First attempt by PELON, second attempt by DAVEY

## 2024-04-16 NOTE — PERIOPERATIVE NURSING NOTE
1345 Pt arrived to PACU, alert and able to answer questions and follow commands. Noo distress noted. Will continue to monitor.     1445 Pt stable, anesthesia resident made aware of blood pressures in low 100's. Pt alert when awake. Pt ok to go to floor.     1545 Report called to nurse on [facility] tower 6. Transport requested for pt.     1548 Pt in route to room on [facility] tower 6. Pt stable.       Sola Goncalves RN

## 2024-04-16 NOTE — H&P
H&P reviewed. The patient was examined and there are no changes to the H&P. Patient electing to proceed with surgery. Patient marked and consented.      Miles Morgan MD  Orthopaedic Surgery, PGY-2  EpicChat preferred

## 2024-04-16 NOTE — BRIEF OP NOTE
Date: 2024  OR Location: [facility] OR    Name: Nuris Squires, : 1965, Age: 58 y.o., MRN: [id number], Sex: female    Diagnosis  Pre-op Diagnosis     * Other closed fracture of left femur, unspecified portion of femur, initial encounter (Multi) [S72.8X2A] Post-op Diagnosis     * Other closed fracture of left femur, unspecified portion of femur, initial encounter (Multi) [S72.8X2A]     Procedures  Open Reduction Internal Fixation Femur  31471 - [place] OPEN TX FEMORAL FRACTURE DISTAL MED/LAT CONDYLE    Insertion Intramedullary Nail Femur  20892 - [place] OPTX FEM SHFT FX W/INSJ IMED IMPLT W/WO SCREW      Surgeons      * Barrera Rodriguez - Primary    Resident/Fellow/Other Assistant:  Surgeons and Role:     * Jessica Nation MD - Resident - Assisting     * Jerry Reardon MD - Fellow    Procedure Summary  Anesthesia: General  ASA: III  Anesthesia Staff: Anesthesiologist: Petra Kwon MD MPH  CRNA: TAL Stratton-CRNA  C-AA: DAVEY Bustamante  Estimated Blood Loss: 250 mL  Intra-op Medications:   Administrations occurring from 1000 to 1325 on 24:   Medication Name Total Dose   sodium chloride 0.9 % irrigation solution 3,000 mL   vancomycin (Vancocin) vial for injection 1 g   tobramycin (Nebcin) injection 1,200 mg   acetaminophen (Tylenol) tablet 975 mg Cannot be calculated   insulin lispro (HumaLOG) injection 10 Units Cannot be calculated              Anesthesia Record               Intraprocedure I/O Totals          Intake    LR bolus 500.00 mL    Tranexamic Acid 0.00 mL    The total shown is the total volume documented since Anesthesia Start was filed.    Phenylephrine Drip 0.00 mL    The total shown is the total volume documented since Anesthesia Start was filed.    Total Intake 500 mL       Output    Est. Blood Loss 250 mL    Total Output 250 mL       Net    Net Volume 250 mL          Specimen: No specimens collected     Staff:   Circulator: Marleny Stone RN; Rich Hernández RN  Relief  Circulator: Nahomy Burnett RN  Scrub Person: Nasseem Awadallah; Valdo Romano          Findings: Stable fracture fixation and safe extra-articular hardware placement noted at the conclusion of the case.    Complications:  None; patient tolerated the procedure well.     Disposition: PACU - hemodynamically stable.  Condition: stable  Specimens Collected: No specimens collected  Attending Attestation: I was present and scrubbed for the key portions of the procedure.    Barrera Rodriguez  Phone Number: [phone]

## 2024-04-17 LAB
ABO GROUP (TYPE) IN BLOOD: NORMAL
ALBUMIN SERPL BCP-MCNC: 2.9 G/DL (ref 3.4–5)
ANION GAP SERPL CALC-SCNC: 14 MMOL/L (ref 10–20)
BUN SERPL-MCNC: 31 MG/DL (ref 6–23)
CALCIUM SERPL-MCNC: 8.4 MG/DL (ref 8.6–10.6)
CHLORIDE SERPL-SCNC: 102 MMOL/L (ref 98–107)
CO2 SERPL-SCNC: 23 MMOL/L (ref 21–32)
CREAT SERPL-MCNC: 2.03 MG/DL (ref 0.5–1.05)
EGFRCR SERPLBLD CKD-EPI 2021: 28 ML/MIN/1.73M*2
ERYTHROCYTE [DISTWIDTH] IN BLOOD BY AUTOMATED COUNT: 12.6 % (ref 11.5–14.5)
GLUCOSE BLD MANUAL STRIP-MCNC: 108 MG/DL (ref 74–99)
GLUCOSE BLD MANUAL STRIP-MCNC: 142 MG/DL (ref 74–99)
GLUCOSE BLD MANUAL STRIP-MCNC: 155 MG/DL (ref 74–99)
GLUCOSE BLD MANUAL STRIP-MCNC: 262 MG/DL (ref 74–99)
GLUCOSE BLD MANUAL STRIP-MCNC: 316 MG/DL (ref 74–99)
GLUCOSE BLD MANUAL STRIP-MCNC: 65 MG/DL (ref 74–99)
GLUCOSE BLD MANUAL STRIP-MCNC: 78 MG/DL (ref 74–99)
GLUCOSE SERPL-MCNC: 216 MG/DL (ref 74–99)
HCT VFR BLD AUTO: 21.5 % (ref 36–46)
HGB BLD-MCNC: 7.1 G/DL (ref 12–16)
MAGNESIUM SERPL-MCNC: 2.08 MG/DL (ref 1.6–2.4)
MCH RBC QN AUTO: 29.7 PG (ref 26–34)
MCHC RBC AUTO-ENTMCNC: 33 G/DL (ref 32–36)
MCV RBC AUTO: 90 FL (ref 80–100)
NRBC BLD-RTO: 0 /100 WBCS (ref 0–0)
PHOSPHATE SERPL-MCNC: 5 MG/DL (ref 2.5–4.9)
PLATELET # BLD AUTO: 217 X10*3/UL (ref 150–450)
POTASSIUM SERPL-SCNC: 4.5 MMOL/L (ref 3.5–5.3)
RBC # BLD AUTO: 2.39 X10*6/UL (ref 4–5.2)
RH FACTOR (ANTIGEN D): NORMAL
SODIUM SERPL-SCNC: 134 MMOL/L (ref 136–145)
WBC # BLD AUTO: 8.7 X10*3/UL (ref 4.4–11.3)

## 2024-04-17 PROCEDURE — 85027 COMPLETE CBC AUTOMATED: CPT

## 2024-04-17 PROCEDURE — 2500000002 HC RX 250 W HCPCS SELF ADMINISTERED DRUGS (ALT 637 FOR MEDICARE OP, ALT 636 FOR OP/ED)

## 2024-04-17 PROCEDURE — 36415 COLL VENOUS BLD VENIPUNCTURE: CPT

## 2024-04-17 PROCEDURE — 2500000002 HC RX 250 W HCPCS SELF ADMINISTERED DRUGS (ALT 637 FOR MEDICARE OP, ALT 636 FOR OP/ED): Mod: MUE

## 2024-04-17 PROCEDURE — 2500000004 HC RX 250 GENERAL PHARMACY W/ HCPCS (ALT 636 FOR OP/ED): Mod: JZ

## 2024-04-17 PROCEDURE — 82947 ASSAY GLUCOSE BLOOD QUANT: CPT | Mod: 91,MUE

## 2024-04-17 PROCEDURE — 2500000004 HC RX 250 GENERAL PHARMACY W/ HCPCS (ALT 636 FOR OP/ED)

## 2024-04-17 PROCEDURE — 1100000001 HC PRIVATE ROOM DAILY

## 2024-04-17 PROCEDURE — 2500000001 HC RX 250 WO HCPCS SELF ADMINISTERED DRUGS (ALT 637 FOR MEDICARE OP)

## 2024-04-17 PROCEDURE — 80069 RENAL FUNCTION PANEL: CPT

## 2024-04-17 PROCEDURE — 83735 ASSAY OF MAGNESIUM: CPT

## 2024-04-17 PROCEDURE — 99232 SBSQ HOSP IP/OBS MODERATE 35: CPT

## 2024-04-17 RX ORDER — INSULIN LISPRO 100 [IU]/ML
11 INJECTION, SOLUTION INTRAVENOUS; SUBCUTANEOUS ONCE
Status: COMPLETED | OUTPATIENT
Start: 2024-04-17 | End: 2024-04-17

## 2024-04-17 RX ADMIN — INSULIN LISPRO 10 UNITS: 100 INJECTION, SOLUTION INTRAVENOUS; SUBCUTANEOUS at 12:00

## 2024-04-17 RX ADMIN — CLONIDINE HYDROCHLORIDE 0.1 MG: 0.1 TABLET ORAL at 09:00

## 2024-04-17 RX ADMIN — ACETAMINOPHEN 975 MG: 325 TABLET ORAL at 00:19

## 2024-04-17 RX ADMIN — OXYCODONE HYDROCHLORIDE 10 MG: 5 TABLET ORAL at 22:28

## 2024-04-17 RX ADMIN — POLYETHYLENE GLYCOL 3350 17 G: 17 POWDER, FOR SOLUTION ORAL at 09:00

## 2024-04-17 RX ADMIN — OXYCODONE HYDROCHLORIDE 10 MG: 5 TABLET ORAL at 01:55

## 2024-04-17 RX ADMIN — ATORVASTATIN CALCIUM 40 MG: 40 TABLET, FILM COATED ORAL at 08:59

## 2024-04-17 RX ADMIN — GABAPENTIN 300 MG: 300 CAPSULE ORAL at 08:59

## 2024-04-17 RX ADMIN — ENOXAPARIN SODIUM 40 MG: 100 INJECTION SUBCUTANEOUS at 04:21

## 2024-04-17 RX ADMIN — INSULIN GLARGINE 12 UNITS: 100 INJECTION, SOLUTION SUBCUTANEOUS at 04:00

## 2024-04-17 RX ADMIN — OXYCODONE HYDROCHLORIDE 5 MG: 5 TABLET ORAL at 18:30

## 2024-04-17 RX ADMIN — ACETAMINOPHEN 975 MG: 325 TABLET ORAL at 05:49

## 2024-04-17 RX ADMIN — ASPIRIN 81 MG 81 MG: 81 TABLET ORAL at 09:00

## 2024-04-17 RX ADMIN — INSULIN LISPRO 8 UNITS: 100 INJECTION, SOLUTION INTRAVENOUS; SUBCUTANEOUS at 06:19

## 2024-04-17 RX ADMIN — ACETAMINOPHEN 975 MG: 325 TABLET ORAL at 11:56

## 2024-04-17 RX ADMIN — FOLIC ACID 1 MG: 1 TABLET ORAL at 08:59

## 2024-04-17 RX ADMIN — INSULIN LISPRO 10 UNITS: 100 INJECTION, SOLUTION INTRAVENOUS; SUBCUTANEOUS at 08:00

## 2024-04-17 RX ADMIN — INSULIN LISPRO 11 UNITS: 100 INJECTION, SOLUTION INTRAVENOUS; SUBCUTANEOUS at 01:41

## 2024-04-17 RX ADMIN — CLONIDINE HYDROCHLORIDE 0.1 MG: 0.1 TABLET ORAL at 22:02

## 2024-04-17 RX ADMIN — AMLODIPINE BESYLATE 10 MG: 10 TABLET ORAL at 08:59

## 2024-04-17 RX ADMIN — GABAPENTIN 300 MG: 300 CAPSULE ORAL at 21:43

## 2024-04-17 RX ADMIN — ONDANSETRON 4 MG: 2 INJECTION INTRAMUSCULAR; INTRAVENOUS at 11:56

## 2024-04-17 RX ADMIN — Medication 2000 UNITS: at 06:50

## 2024-04-17 RX ADMIN — CEFAZOLIN SODIUM 2 G: 2 INJECTION, SOLUTION INTRAVENOUS at 06:21

## 2024-04-17 RX ADMIN — INSULIN GLARGINE 12 UNITS: 100 INJECTION, SOLUTION SUBCUTANEOUS at 21:00

## 2024-04-17 RX ADMIN — ENOXAPARIN SODIUM 40 MG: 100 INJECTION SUBCUTANEOUS at 15:45

## 2024-04-17 ASSESSMENT — PAIN DESCRIPTION - ORIENTATION: ORIENTATION: LEFT

## 2024-04-17 ASSESSMENT — COGNITIVE AND FUNCTIONAL STATUS - GENERAL
MOBILITY SCORE: 16
PERSONAL GROOMING: A LITTLE
MOVING TO AND FROM BED TO CHAIR: A LOT
DRESSING REGULAR UPPER BODY CLOTHING: A LITTLE
TURNING FROM BACK TO SIDE WHILE IN FLAT BAD: A LITTLE
WALKING IN HOSPITAL ROOM: A LOT
MOVING FROM LYING ON BACK TO SITTING ON SIDE OF FLAT BED WITH BEDRAILS: A LITTLE
CLIMB 3 TO 5 STEPS WITH RAILING: TOTAL
DRESSING REGULAR LOWER BODY CLOTHING: A LITTLE
HELP NEEDED FOR BATHING: A LITTLE
STANDING UP FROM CHAIR USING ARMS: A LOT
MOBILITY SCORE: 13
MOVING TO AND FROM BED TO CHAIR: A LITTLE
STANDING UP FROM CHAIR USING ARMS: A LITTLE
TURNING FROM BACK TO SIDE WHILE IN FLAT BAD: A LITTLE
MOVING FROM LYING ON BACK TO SITTING ON SIDE OF FLAT BED WITH BEDRAILS: A LITTLE
CLIMB 3 TO 5 STEPS WITH RAILING: A LOT
DAILY ACTIVITIY SCORE: 18
TOILETING: A LOT
WALKING IN HOSPITAL ROOM: A LOT

## 2024-04-17 ASSESSMENT — PAIN SCALES - GENERAL
PAINLEVEL_OUTOF10: 10 - WORST POSSIBLE PAIN
PAINLEVEL_OUTOF10: 5 - MODERATE PAIN
PAINLEVEL_OUTOF10: 3
PAINLEVEL_OUTOF10: 8
PAINLEVEL_OUTOF10: 10 - WORST POSSIBLE PAIN

## 2024-04-17 ASSESSMENT — PAIN - FUNCTIONAL ASSESSMENT
PAIN_FUNCTIONAL_ASSESSMENT: 0-10

## 2024-04-17 ASSESSMENT — PAIN DESCRIPTION - LOCATION: LOCATION: LEG

## 2024-04-17 ASSESSMENT — PAIN DESCRIPTION - DESCRIPTORS: DESCRIPTORS: ACHING;DISCOMFORT

## 2024-04-17 NOTE — PROGRESS NOTES
Met with patient and introduced myself as Care Coordinator and member of the discharge planning team.  Patient is s/p IM nail of L femur fracture. She plans to return home at time of discharge. She says she has a first floor set up. She has a cane and a walker. She has used  home Care in the past and would like a referral sent to them for PT and OT. Requesting [facility]. Care Coordinator will continue to follow for home going needs.  Pattie Montenegro RN

## 2024-04-17 NOTE — PROGRESS NOTES
Physical Therapy                 Therapy Communication Note    Patient Name: Nuris Squires  MRN: [id number]  Today's Date: 4/17/2024     Discipline: Physical Therapy    Missed Visit Reason: Missed Visit Reason: Other (Comment) ([id number]. PT eval initiated, PLOF and home set up obtained. Prior to mobilizing pt. with brief episode of unresponsiveness. Notified RN immediately and vitals assessed. Pt. endorsing headache and nausea, RN notified medical team)    Second attempt at 11:25. RN hold as pt currently experiencing another episode. Will hold PT eval until medically appropriate.     Missed Time: Attempt, 16 minutes non-billable time.     Pt was priorly IND with functional mobility and ADLs, no device at baseline. Pt lives in house with boyfriend (who works at 4pm). 5 SISSY with HR. Bedroom/bathroom on 1st level with tub/shower. Pt reports needing assist with tub transfers though completes dressing/bathing IND. Has FWW, cane, shower chair, and grab bars. Pt is retired, +drives, and has had 2 recent falls within the last 3 months.

## 2024-04-17 NOTE — PROGRESS NOTES
Pharmacy Admission Order Reconciliation Review    Nuris Squires is a 58 y.o. female admitted for Closed fracture of left femur (Multi). Pharmacy reviewed the patient's unreconciled admission medications.    Prior to admission medications that were reviewed and acted on by the pharmacist include:  Clonidine    These medications have been reconciled.     Any other unreconcilied medications have been addressed and will be ordered or held by the patient's medical team. Medications addressed by the pharmacist may be added or changed by the patient's medical team at any time.    Tonya Restrepo, PharmD  Transitions of Care Pharmacist  [facility]  Please reach out via Secure Chat for questions, or if no response call [phone]

## 2024-04-17 NOTE — CARE PLAN
The patient's goals for the shift include pain control    The clinical goals for the shift include Pt. will maintain a stable oxygen saturation this shift [id number]    Over the shift, the patient did not make progress toward the following goals. Barriers to progression include ***. Recommendations to address these barriers include ***.

## 2024-04-17 NOTE — PROGRESS NOTES
Occupational Therapy                 Therapy Communication Note    Patient Name: Nuris Squires  MRN: [id number]  Today's Date: 4/17/2024     Discipline: Occupational Therapy    Missed Visit Reason: Missed Visit Reason:  (in room from 9:15-9:30 obtaining prior level of fx/history; pt had multiple episodes of emesis /brief unresponsiveness noted - nsg notified. Pt c/o headache, nausea- medical team notified)  Second attempt at 11:24- pt currently having more emesis episodes; nsg requested therapy hold at this time.    Missed Time: Attempt

## 2024-04-17 NOTE — CARE PLAN
The patient's goals for the shift include  pain control    The clinical goals for the shift include Patient to have a decrease in pain    Over the shift, the patient did make progress toward the following goals. Barriers to progression include n/a. Recommendations to address these barriers include n/a

## 2024-04-17 NOTE — PROGRESS NOTES
[facility]  TRAUMA SERVICE - PROGRESS NOTE    Patient Name: Nuris Squires  MRN: [id number]  Admit Date: 415  : 1965  AGE: 58 y.o.   GENDER: female  ==============================================================================  MECHANISM OF INJURY:   59 y/o F s/p mGLF onto L knee. Pt states she was cooking on the stove and twisted her knee ultimately falling onto her left knee. She denies dizziness, lightheadedness, or LOC at time of event. She does endorse falling more often, last fall about a month ago. She lives at home with boyfriend. She denies chest pain and SOB at time of exam. Pt has a pmh of DM, HTN. Pt is not on blood thinners.     LOC (yes/no?): No  Anticoagulant / Anti-platelet Rx? (for what dx?): No  Referring Facility Name (N/A for scene EMR run): N/A    INJURIES:   Comminuted and moderately displaced intra-articular fracture of the L distal femur     OTHER MEDICAL PROBLEMS:  HTN  DM    INCIDENTAL FINDINGS:  None    PROCEDURES:  : L femur ORIF    ==============================================================================  TODAY'S ASSESSMENT AND PLAN OF CARE:    #Comminuted and moderately displaced intra-articular fracture of the L distal femur    -->Ortho Consult, recs       -OR yesterday with Ortho for ORIF of L femur       -NWB LLE, ROMAT  -Pain control: tylenol scheduled, prn oxy 5/10  -PT/OT post op  -RCRI: class 1 risk     ##Comorbids  -Med rec complete   -Cont home meds as appropriate, on amlodipine, aspirin, atorvastatin, clonidine, gabapentin, and sliding scale insulin  -given one time 11 units of lantus for elevated sugars   -glucose POCT for meals and as warranted     #FEN//GI  -voiding freely  -on Miramax  -diabetic regular diet  -vomiting, zofran ordered     #DVT ppx  -LVX 40 mg BID (Crcl 72, BMI 41)  -SCD to right leg     Dispo: Continue RNF care      Pt discussed with Dr. Mascorro    Total face to face time spent with patient/family of  20 minutes, with >50% of the time spent discussing plan of care/management, counseling/educating on disease processes, explaining results of diagnostic testing.      Robert Galindo PA-C  Trauma Surgery  [id number]       ==============================================================================  CHIEF COMPLAINT / OVERNIGHT EVENTS:   Patient was seen laying in bed appearing comfortable. Patient states that she vomited twice but has not other complaints at this time. Patient denies any chest pain, SOB, nausea, vomiting, change in Bms, dysuria.     MEDICAL HISTORY / ROS:  Admission history and ROS reviewed. Pertinent changes as follows:      PHYSICAL EXAM:  Heart Rate:  [67-98]   Temp:  [35.7 °C (96.3 °F)-36.6 °C (97.9 °F)]   Resp:  [15-18]   BP: (101-119)/(61-83)   SpO2:  [94 %-100 %]   Physical Exam  Constitutional:       Appearance: Normal appearance.   HENT:      Head: Normocephalic and atraumatic.      Right Ear: External ear normal.      Left Ear: External ear normal.      Nose: Nose normal.      Mouth/Throat:      Mouth: Mucous membranes are moist.   Eyes:      Pupils: Pupils are equal, round, and reactive to light.   Cardiovascular:      Rate and Rhythm: Normal rate and regular rhythm.      Pulses: Normal pulses.      Heart sounds: Normal heart sounds.   Pulmonary:      Effort: Pulmonary effort is normal.      Breath sounds: Normal breath sounds.   Abdominal:      General: Bowel sounds are normal. There is no distension.      Palpations: Abdomen is soft.      Tenderness: There is no abdominal tenderness.   Musculoskeletal:      Cervical back: Normal range of motion and neck supple.      Comments: TTP of left femur, LLE wrapped, NVI   Skin:     General: Skin is warm and dry.      Capillary Refill: Capillary refill takes less than 2 seconds.   Neurological:      Mental Status: She is alert and oriented to person, place, and time.   Psychiatric:         Mood and Affect: Mood normal.         Behavior: Behavior normal.          IMAGING SUMMARY:  (summary of new imaging findings, not a copy of dictation)  No new imaging    LABS:  Results from last 7 days   Lab Units 04/17/24  0901 04/16/24  0637 04/15/24  2128   WBC AUTO x10*3/uL 8.7 9.2 9.6   HEMOGLOBIN g/dL 7.1* 8.7* 10.6*   HEMATOCRIT % 21.5* 27.2* 31.6*   PLATELETS AUTO x10*3/uL 217 256 318   NEUTROS PCT AUTO %  --   --  75.7   LYMPHS PCT AUTO %  --   --  18.3   MONOS PCT AUTO %  --   --  4.5   EOS PCT AUTO %  --   --  0.6     Results from last 7 days   Lab Units 04/15/24  2128   APTT seconds 31   INR  0.9     Results from last 7 days   Lab Units 04/17/24  0901 04/15/24  2128   SODIUM mmol/L 134* 136   POTASSIUM mmol/L 4.5 4.2   CHLORIDE mmol/L 102 103   CO2 mmol/L 23 26   BUN mg/dL 31* 30*   CREATININE mg/dL 2.03* 1.36*   CALCIUM mg/dL 8.4* 8.9   PROTEIN TOTAL g/dL  --  6.9   BILIRUBIN TOTAL mg/dL  --  0.4   ALK PHOS U/L  --  120*   ALT U/L  --  23   AST U/L  --  22   GLUCOSE mg/dL 216* 399*     Results from last 7 days   Lab Units 04/15/24  2128   BILIRUBIN TOTAL mg/dL 0.4           I have reviewed all medications, laboratory results, and imaging pertinent for today's encounter.

## 2024-04-17 NOTE — CARE PLAN
The patient's goals for the shift include pain control    The clinical goals for the shift include Pt. will maintain a stable oxygen saturation this shift 4143-5206    Over the shift, the patient has been A&O X4, very drowsy throughout shift. Experienced X2 episodes of syncope and muscle twitching followed my emesis. VSS, and otherwise neurologically intact. Physician made aware and at bedside to assess. PRN Zofran given X1 and was effective. Pt. Later became more awake and eating meals. Evening insulin held due to low bs of 65, recheck was 78. Physician made aware. Pt. C/o pain to L lower extremity, medicated with prn oxycodone.

## 2024-04-17 NOTE — PROGRESS NOTES
"Orthopaedic Surgery Progress Note    S:  No acute events overnight. Pain controlled on current regimen. Denies chest pain, shortness of breath, fever or chills, and new numbness/tingling. Denies pain elsewhere.    Discussed postop instructions w patient.    O:  /83 (BP Location: Left arm, Patient Position: Lying)   Pulse 83   Temp 36 °C (96.8 °F) (Temporal)   Resp 17   Ht 1.575 m (5' 2\")   Wt 102 kg (225 lb)   SpO2 100%   BMI 41.15 kg/m²     Gen: arousable, NAD, appropriately conversational  Cardiac: RRR to peripheral palpation  Resp: nonlabored on RA  GI: soft, nondistended    MSK:  LLE  - Surgical dressing c/d/i  - Fires DF/PF/EHL/FHL  - SILT in saph/sural/SPN/DPN distributions  - Foot warm, well perfused  - DP/PT pulse, brisk cap refill  - Compartments soft and compressible    Assessment:  Injury: L intra articular distal femur fx    58F (DM, obesity, charcot R foot sp ankle fusion) presents after mGLF, now s/p L femur ORIF and rIMN w/Dr. Rodriguez on 4/16.    Plan:  - Weightbearing Status: NWB LLE, ROMAT  - Pain: per primary  - Perioperative ABx: Ancef  - DVT PPx: SCDs, chemoprophylaxis per primary (recommend ASA enteric-coated 81 mg BID x 4 wks)  - Dressing: mepilex, remove POD7 (4/23)  - PT/OT  - Tertiary negative    We will follow peripherally while patient is in house. Pt will need to be NWB (Non Weight Bearing) left lower extremity until first follow up appointment. Patient will require 4 weeks total of recommended ASA 81 mg BID for DVT prophylaxis from an orthopedic standpoint, if ok per primary team. Patient currently has mepilex dressing on surgical site. Dressing should be changed POD7 (4/23)     Please send with Calcium (as carbonate)-Vitamin D 600mg-400IU PO BID for 30 days upon discharge.     Patient should follow up w/ Dr. Rodriguez 3 weeks after surgery for post-operative appointment (patient may call [phone] to schedule). Please page with questions.    Jessica Nation, PGY-1  Orthopedic " Surgery  Available via EpicChat    This patient will be peripherally followed by Ortho Trauma team (All chat preferred):    1st call: Jessica Nation PGY-1 or Lenny Salazar PGY-1  2nd call: Janes Jj, PGY-2  3rd call: Valdo Gao, PGY-3    On weekends and after 6PM:  [facility]: Please reach out to the orthopaedic on-call resident ([phone])  At [facility]: Please reach out to the orthopaedic on-call PHYLLIS or resident (please refer to Maribela)

## 2024-04-18 LAB
GLUCOSE BLD MANUAL STRIP-MCNC: 118 MG/DL (ref 74–99)
GLUCOSE BLD MANUAL STRIP-MCNC: 164 MG/DL (ref 74–99)
GLUCOSE BLD MANUAL STRIP-MCNC: 219 MG/DL (ref 74–99)
GLUCOSE BLD MANUAL STRIP-MCNC: 67 MG/DL (ref 74–99)
GLUCOSE BLD MANUAL STRIP-MCNC: 78 MG/DL (ref 74–99)

## 2024-04-18 PROCEDURE — 2500000001 HC RX 250 WO HCPCS SELF ADMINISTERED DRUGS (ALT 637 FOR MEDICARE OP)

## 2024-04-18 PROCEDURE — 1100000001 HC PRIVATE ROOM DAILY

## 2024-04-18 PROCEDURE — 2500000004 HC RX 250 GENERAL PHARMACY W/ HCPCS (ALT 636 FOR OP/ED)

## 2024-04-18 PROCEDURE — 2500000002 HC RX 250 W HCPCS SELF ADMINISTERED DRUGS (ALT 637 FOR MEDICARE OP, ALT 636 FOR OP/ED)

## 2024-04-18 PROCEDURE — 97162 PT EVAL MOD COMPLEX 30 MIN: CPT | Mod: GP | Performed by: PHYSICAL THERAPIST

## 2024-04-18 PROCEDURE — 82947 ASSAY GLUCOSE BLOOD QUANT: CPT

## 2024-04-18 RX ADMIN — CLONIDINE HYDROCHLORIDE 0.1 MG: 0.1 TABLET ORAL at 22:53

## 2024-04-18 RX ADMIN — ACETAMINOPHEN 975 MG: 325 TABLET ORAL at 18:28

## 2024-04-18 RX ADMIN — CLONIDINE HYDROCHLORIDE 0.1 MG: 0.1 TABLET ORAL at 08:07

## 2024-04-18 RX ADMIN — FOLIC ACID 1 MG: 1 TABLET ORAL at 08:07

## 2024-04-18 RX ADMIN — ENOXAPARIN SODIUM 40 MG: 100 INJECTION SUBCUTANEOUS at 18:28

## 2024-04-18 RX ADMIN — ASPIRIN 81 MG 81 MG: 81 TABLET ORAL at 08:07

## 2024-04-18 RX ADMIN — AMLODIPINE BESYLATE 10 MG: 10 TABLET ORAL at 08:07

## 2024-04-18 RX ADMIN — INSULIN LISPRO 10 UNITS: 100 INJECTION, SOLUTION INTRAVENOUS; SUBCUTANEOUS at 08:08

## 2024-04-18 RX ADMIN — ACETAMINOPHEN 975 MG: 325 TABLET ORAL at 00:28

## 2024-04-18 RX ADMIN — POLYETHYLENE GLYCOL 3350 17 G: 17 POWDER, FOR SOLUTION ORAL at 08:07

## 2024-04-18 RX ADMIN — INSULIN LISPRO 4 UNITS: 100 INJECTION, SOLUTION INTRAVENOUS; SUBCUTANEOUS at 08:07

## 2024-04-18 RX ADMIN — GABAPENTIN 300 MG: 300 CAPSULE ORAL at 21:50

## 2024-04-18 RX ADMIN — INSULIN LISPRO 2 UNITS: 100 INJECTION, SOLUTION INTRAVENOUS; SUBCUTANEOUS at 21:51

## 2024-04-18 RX ADMIN — ATORVASTATIN CALCIUM 40 MG: 40 TABLET, FILM COATED ORAL at 08:07

## 2024-04-18 RX ADMIN — ACETAMINOPHEN 975 MG: 325 TABLET ORAL at 06:04

## 2024-04-18 RX ADMIN — Medication 2000 UNITS: at 06:04

## 2024-04-18 RX ADMIN — GABAPENTIN 300 MG: 300 CAPSULE ORAL at 08:07

## 2024-04-18 RX ADMIN — ENOXAPARIN SODIUM 40 MG: 100 INJECTION SUBCUTANEOUS at 03:20

## 2024-04-18 RX ADMIN — INSULIN GLARGINE 12 UNITS: 100 INJECTION, SOLUTION SUBCUTANEOUS at 21:50

## 2024-04-18 RX ADMIN — ACETAMINOPHEN 975 MG: 325 TABLET ORAL at 23:00

## 2024-04-18 RX ADMIN — OXYCODONE HYDROCHLORIDE 10 MG: 5 TABLET ORAL at 03:23

## 2024-04-18 ASSESSMENT — COGNITIVE AND FUNCTIONAL STATUS - GENERAL
WALKING IN HOSPITAL ROOM: TOTAL
MOVING FROM LYING ON BACK TO SITTING ON SIDE OF FLAT BED WITH BEDRAILS: A LOT
HELP NEEDED FOR BATHING: A LITTLE
TURNING FROM BACK TO SIDE WHILE IN FLAT BAD: A LOT
DRESSING REGULAR LOWER BODY CLOTHING: A LITTLE
MOVING TO AND FROM BED TO CHAIR: TOTAL
TURNING FROM BACK TO SIDE WHILE IN FLAT BAD: A LOT
DAILY ACTIVITIY SCORE: 18
CLIMB 3 TO 5 STEPS WITH RAILING: TOTAL
MOBILITY SCORE: 8
WALKING IN HOSPITAL ROOM: TOTAL
PERSONAL GROOMING: A LITTLE
CLIMB 3 TO 5 STEPS WITH RAILING: TOTAL
STANDING UP FROM CHAIR USING ARMS: TOTAL
MOVING TO AND FROM BED TO CHAIR: TOTAL
STANDING UP FROM CHAIR USING ARMS: TOTAL
TOILETING: A LOT
MOVING FROM LYING ON BACK TO SITTING ON SIDE OF FLAT BED WITH BEDRAILS: A LOT
DRESSING REGULAR UPPER BODY CLOTHING: A LITTLE
MOBILITY SCORE: 8

## 2024-04-18 ASSESSMENT — PAIN SCALES - GENERAL
PAINLEVEL_OUTOF10: 0 - NO PAIN
PAINLEVEL_OUTOF10: 6
PAINLEVEL_OUTOF10: 8
PAINLEVEL_OUTOF10: 4
PAINLEVEL_OUTOF10: 7

## 2024-04-18 ASSESSMENT — PAIN - FUNCTIONAL ASSESSMENT
PAIN_FUNCTIONAL_ASSESSMENT: 0-10

## 2024-04-18 ASSESSMENT — ACTIVITIES OF DAILY LIVING (ADL): ADL_ASSISTANCE: INDEPENDENT

## 2024-04-18 ASSESSMENT — PAIN DESCRIPTION - ORIENTATION: ORIENTATION: LEFT

## 2024-04-18 ASSESSMENT — PAIN SCALES - WONG BAKER: WONGBAKER_NUMERICALRESPONSE: HURTS LITTLE BIT

## 2024-04-18 ASSESSMENT — PAIN DESCRIPTION - LOCATION
LOCATION: BACK
LOCATION: LEG

## 2024-04-18 NOTE — PROGRESS NOTES
.[facility]  TRAUMA SERVICE - PROGRESS NOTE    Patient Name: Nuris Squires  MRN: [id number]  Admit Date: 415  : 1965  AGE: 58 y.o.   GENDER: female  ==============================================================================  MECHANISM OF INJURY:   57 y/o F s/p mGLF onto L knee. Pt states she was cooking on the stove and twisted her knee ultimately falling onto her left knee. She denies dizziness, lightheadedness, or LOC at time of event. She does endorse falling more often, last fall about a month ago. She lives at home with boyfriend. She denies chest pain and SOB at time of exam. Pt has a pmh of DM, HTN. Pt is not on blood thinners.     LOC (yes/no?): No  Anticoagulant / Anti-platelet Rx? (for what dx?): No  Referring Facility Name (N/A for scene EMR run): N/A    INJURIES:   Comminuted and moderately displaced intra-articular fracture of the L distal femur     OTHER MEDICAL PROBLEMS:  HTN  DM    INCIDENTAL FINDINGS:  None    PROCEDURES:  : L femur ORIF    ==============================================================================  TODAY'S ASSESSMENT AND PLAN OF CARE:    #Comminuted and moderately displaced intra-articular fracture of the L distal femur    -->Ortho Consult, recs       -OR 16 with Ortho for ORIF of L femur       -NWB LLE, ROMAT  -Pain control: tylenol scheduled, prn oxy 5/10  -PT/OT rec'd SNF     ##Comorbids  -Med rec complete   -Cont home meds as appropriate, on amlodipine, aspirin, atorvastatin, clonidine, gabapentin, and sliding scale insulin  -glucose POCT for meals and as warranted     #FEN//GI  -voiding freely  -on Miralax  -diabetic regular diet  -zofran ordered     #DVT ppx  -LVX 40 mg BID (Crcl 72, BMI 41)  -SCD to right leg     Dispo: medically clear for discharge. Rec'd SNF by PT/OT, but reported to be refusing discharge to a facility.     Pt seen and discussed with attending, Dr. Subha Demarco MD  General Surgery  PGY1  Trauma Surgery Service    ==============================================================================  CHIEF COMPLAINT / OVERNIGHT EVENTS:   No acute overnight events. Patient was sleeping comfortably and when awoken, told trauma team to leave. No other acute patient concerns.    MEDICAL HISTORY / ROS:  Admission history and ROS reviewed. Pertinent changes as follows:      PHYSICAL EXAM:  Heart Rate:  []   Temp:  [36.1 °C (97 °F)-36.9 °C (98.4 °F)]   Resp:  [16-18]   BP: (101-158)/(61-77)   SpO2:  [94 %-100 %]   Physical Exam  Constitutional:       Appearance: Normal appearance.   HENT:      Head: Normocephalic and atraumatic.      Right Ear: External ear normal.      Left Ear: External ear normal.      Nose: Nose normal.      Mouth/Throat:      Mouth: Mucous membranes are moist.   Eyes:      Pupils: Pupils are equal, round, and reactive to light.   Cardiovascular:      Rate and Rhythm: Normal rate and regular rhythm.      Pulses: Normal pulses.      Heart sounds: Normal heart sounds.   Pulmonary:      Effort: Pulmonary effort is normal.      Breath sounds: Normal breath sounds.   Abdominal:      General: Bowel sounds are normal. There is no distension.      Palpations: Abdomen is soft.      Tenderness: There is no abdominal tenderness.   Musculoskeletal:      Cervical back: Normal range of motion and neck supple.      Comments: TTP of left femur, LLE wrapped, NVI   Skin:     General: Skin is warm and dry.      Capillary Refill: Capillary refill takes less than 2 seconds.   Neurological:      Mental Status: She is alert and oriented to person, place, and time.   Psychiatric:         Mood and Affect: Mood normal.         Behavior: Behavior normal.         IMAGING SUMMARY:  (summary of new imaging findings, not a copy of dictation)  No new imaging    LABS:  Results from last 7 days   Lab Units 04/17/24  0901 04/16/24  0637 04/15/24  2128   WBC AUTO x10*3/uL 8.7 9.2 9.6   HEMOGLOBIN g/dL 7.1* 8.7* 10.6*    HEMATOCRIT % 21.5* 27.2* 31.6*   PLATELETS AUTO x10*3/uL 217 256 318   NEUTROS PCT AUTO %  --   --  75.7   LYMPHS PCT AUTO %  --   --  18.3   MONOS PCT AUTO %  --   --  4.5   EOS PCT AUTO %  --   --  0.6     Results from last 7 days   Lab Units 04/15/24  2128   APTT seconds 31   INR  0.9     Results from last 7 days   Lab Units 04/17/24  0901 04/15/24  2128   SODIUM mmol/L 134* 136   POTASSIUM mmol/L 4.5 4.2   CHLORIDE mmol/L 102 103   CO2 mmol/L 23 26   BUN mg/dL 31* 30*   CREATININE mg/dL 2.03* 1.36*   CALCIUM mg/dL 8.4* 8.9   PROTEIN TOTAL g/dL  --  6.9   BILIRUBIN TOTAL mg/dL  --  0.4   ALK PHOS U/L  --  120*   ALT U/L  --  23   AST U/L  --  22   GLUCOSE mg/dL 216* 399*     Results from last 7 days   Lab Units 04/15/24  2128   BILIRUBIN TOTAL mg/dL 0.4           I have reviewed all medications, laboratory results, and imaging pertinent for today's encounter.

## 2024-04-18 NOTE — CARE PLAN
The patient's goals for the shift include pain control    The clinical goals for the shift include The patient's pain will be under control by end of shift    Over the shift, the patient did not make progress toward the following goals. Barriers to progression include . Recommendations to address these barriers include   Problem: Pain  Goal: My pain/discomfort is manageable  Outcome: Progressing     Problem: Safety  Goal: Patient will be injury free during hospitalization  Outcome: Progressing  Goal: I will remain free of falls  Outcome: Progressing     Problem: Daily Care  Goal: Daily care needs are met  Outcome: Progressing     Problem: Psychosocial Needs  Goal: Demonstrates ability to cope with hospitalization/illness  Outcome: Progressing  Goal: Collaborate with me, my family, and caregiver to identify my specific goals  Outcome: Progressing     Problem: Discharge Barriers  Goal: My discharge needs are met  Outcome: Progressing     Problem: Skin  Goal: Decreased wound size/increased tissue granulation at next dressing change  Outcome: Progressing  Flowsheets (Taken 4/17/2024 2316)  Decreased wound size/increased tissue granulation at next dressing change:   Promote sleep for wound healing   Protective dressings over bony prominences  Goal: Participates in plan/prevention/treatment measures  4/17/2024 2316 by Alexandra Aponte RN  Flowsheets (Taken 4/17/2024 2316)  Participates in plan/prevention/treatment measures:   Increase activity/out of bed for meals   Elevate heels   Discuss with provider PT/OT consult  4/17/2024 2316 by Alexandra Aponte RN  Outcome: Progressing  Flowsheets (Taken 4/17/2024 2316)  Participates in plan/prevention/treatment measures:   Increase activity/out of bed for meals   Elevate heels   Discuss with provider PT/OT consult  Goal: Prevent/manage excess moisture  4/17/2024 2316 by Alexandra Aponte RN  Flowsheets (Taken 4/17/2024 2316)  Prevent/manage excess moisture:   Cleanse  incontinence/protect with barrier cream   Use wicking fabric (obtain order)   Monitor for/manage infection if present  4/17/2024 2316 by Alexandra Aponte RN  Outcome: Progressing  Flowsheets (Taken 4/17/2024 2316)  Prevent/manage excess moisture:   Cleanse incontinence/protect with barrier cream   Use wicking fabric (obtain order)   Monitor for/manage infection if present  Goal: Prevent/minimize sheer/friction injuries  4/17/2024 2316 by Alexandra Aponte RN  Flowsheets (Taken 4/17/2024 2316)  Prevent/minimize sheer/friction injuries:   Increase activity/out of bed for meals   Use pull sheet   HOB 30 degrees or less   Turn/reposition every 2 hours/use positioning/transfer devices  4/17/2024 2316 by Alexandra Aponte RN  Outcome: Progressing  Flowsheets (Taken 4/17/2024 2316)  Prevent/minimize sheer/friction injuries:   Increase activity/out of bed for meals   Use pull sheet   HOB 30 degrees or less   Turn/reposition every 2 hours/use positioning/transfer devices  Goal: Promote/optimize nutrition  4/17/2024 2316 by Alexandra Aponte RN  Flowsheets (Taken 4/17/2024 2316)  Promote/optimize nutrition:   Consume > 50% meals/supplements   Monitor/record intake including meals  4/17/2024 2316 by Alexandra Aponte RN  Outcome: Progressing  Flowsheets (Taken 4/17/2024 2316)  Promote/optimize nutrition:   Consume > 50% meals/supplements   Monitor/record intake including meals  Goal: Promote skin healing  4/17/2024 2316 by Alexandra Aponte RN  Flowsheets (Taken 4/17/2024 2316)  Promote skin healing:   Assess skin/pad under line(s)/device(s)   Turn/reposition every 2 hours/use positioning/transfer devices  4/17/2024 2316 by Alexandra Aponte RN  Outcome: Progressing  Flowsheets (Taken 4/17/2024 2316)  Promote skin healing:   Assess skin/pad under line(s)/device(s)   Turn/reposition every 2 hours/use positioning/transfer devices   .

## 2024-04-18 NOTE — PROGRESS NOTES
Physical Therapy    Physical Therapy Evaluation    Patient Name: Nuris Squires  MRN: [id number]  Today's Date: 4/18/2024   Time Calculation  Start Time: 0850  Stop Time: 0918  Time Calculation (min): 28 min    Assessment/Plan   PT Assessment  PT Assessment Results: Decreased strength, Decreased range of motion, Decreased endurance, Impaired balance, Decreased mobility, Pain  Rehab Prognosis: Fair  Evaluation/Treatment Tolerance: Patient limited by pain, Patient limited by fatigue  Medical Staff Made Aware: Yes  End of Session Communication: Bedside nurse  Assessment Comment: Pt. is a 59 y/o F s/p ORIF L femur. Pt. presents with pain, weakness, impaired motor control, impaired endurance, impaired balance, and difficulty with functional mobility compared to baseline. Pt. would benefit from skilled PT while IP to address these deficits.  End of Session Patient Position: Bed, 3 rail up, Alarm off, not on at start of session  IP OR SWING BED PT PLAN  Inpatient or Swing Bed: Inpatient  PT Plan  Treatment/Interventions: Bed mobility, Transfer training, Gait training, Stair training, Balance training, Strengthening, Endurance training, Therapeutic exercise, Therapeutic activity, Home exercise program  PT Plan: Skilled PT  PT Frequency: 4 times per week  PT Discharge Recommendations: Moderate intensity level of continued care  PT Recommended Transfer Status: Total assist  PT - OK to Discharge: Yes (PT evaluation complete and rehab recommendations made)    Subjective       General Visit Information:  General  Reason for Referral: mGLF, now s/p L femur ORIF and rIMN  Past Medical History Relevant to Rehab: pmh of DM, HTN, obesity, charcot R foot sp ankle fusion  Family/Caregiver Present: Yes  Caregiver Feedback: boyfriend at the bedside throughout session  Prior to Session Communication: Bedside nurse  Patient Position Received: Bed, 3 rail up, Alarm off, not on at start of session  General Comment: Pt. supine in bed,  +lethargy, increased cues throughout to attend to tasks. Involuntary tremors noted B UEs L>R. Required heavy A x 2 for bed mobility this visit, unable to safely attempt transfers/ ambulation    Home Living:  Home Living  Type of Home: House  Lives With: Significant other  Home Adaptive Equipment: Walker rolling or standard, Cane  Home Layout: One level  Home Access: Stairs to enter with rails  Entrance Stairs-Rails: Both  Entrance Stairs-Number of Steps: 5    Prior Level of Function:  Prior Function Per Pt/Caregiver Report  Level of [place]: Independent with ADLs and functional transfers, Needs assistance with homemaking  ADL Assistance: Independent  Homemaking Assistance: Needs assistance (boyfriend assists with cooking/cleaning)  Ambulatory Assistance: Independent (uses SC for community ambulation)    Precautions:  Precautions  LE Weight Bearing Status: Left Non-Weight Bearing  Medical Precautions: Fall precautions    Vital Signs:  Vital Signs  Heart Rate: 85 (96 post-session)  Heart Rate Source: Monitor  SpO2: 95 %  BP: 114/71  BP Location: Left arm  BP Method: Automatic  Patient Position: Sitting  Objective     Pain:  Pain Assessment  Pain Assessment: 0-10  Pain Score: 6  Pain Type: Surgical pain  Pain Location: Knee  Pain Orientation: Left    Cognition:  Cognition  Overall Cognitive Status: Within Functional Limits (though drowsy with delayed responses throughout)  Orientation Level: Oriented X4  Insight: Mild  Processing Speed: Delayed    General Assessments:      Activity Tolerance  Endurance: Decreased tolerance for upright activites  Sensation  Light Touch:  (+ diabetic neuropathy B feet)     Perception  Motor Planning:  (impaired motor planning B UE's. Per pt- has had difficulty and involuntary jerking s/p surgery in January)  Coordination  Movements are Fluid and Coordinated: No     Static Sitting Balance  Static Sitting-Balance Support:  (R LE supported B UE supported with cues)  Static  Sitting-Level of Assistance: Minimum assistance, Contact guard  Static Sitting-Comment/Number of Minutes: occasional R lateral lean, requiring min A to correct with cues for midline upright sitting balance       Functional Assessments:     Bed Mobility  Bed Mobility: Yes  Bed Mobility 1  Bed Mobility 1: Supine to sitting  Level of Assistance 1: Maximum assistance (x2)  Bed Mobility Comments 1: heavy A and cues to scoot to EOB and for trunk righting. HOB elevated  Bed Mobility 2  Bed Mobility  2: Sitting to supine  Level of Assistance 2: Dependent (x2)  Bed Mobility Comments 2: increased A due to fatigue  Bed Mobility 3  Bed Mobility 3: Scooting  Level of Assistance 3: Maximum assistance (x1)  Bed Mobility Comments 3: cues to scoot toward HOB prior to returning to supine. Pt. with difficulty maintaining NWB  Transfers  Transfer: No (Pt. presents with weakness, decreased alertness, and high risk for falls. Therefore further mobility deferred.)  Ambulation/Gait Training  Ambulation/Gait Training Performed: No (deferred)  Stairs  Stairs: No       Extremity/Trunk Assessments:        RLE   RLE : Exceptions to WFL  AROM RLE (degrees)  RLE AROM Comment: grossly WFL  Strength RLE  RLE Overall Strength:  (Grossly 4/5 throughout)  LLE   LLE : Exceptions to WFL  AROM LLE (degrees)  LLE AROM Comment: Limited knee flex/ext secondary to pain/ dressing. All other joints WFL  Strength LLE  LLE Overall Strength:  (Hip flex 2/5, knee flex/ext 2-/5, DF/PF 3/5 \)    Outcome Measures:  [facility] Basic Mobility  Turning from your back to your side while in a flat bed without using bedrails: A lot  Moving from lying on your back to sitting on the side of a flat bed without using bedrails: A lot  Moving to and from bed to chair (including a wheelchair): Total  Standing up from a chair using your arms (e.g. wheelchair or bedside chair): Total  To walk in hospital room: Total  Climbing 3-5 steps with railing: Total  Basic Mobility - Total Score:  8                            Goals:  Encounter Problems       Encounter Problems (Active)       PT Problem       Pt. will perform bed mobility with min A x 1        Start:  04/18/24    Expected End:  05/02/24            Pt. will perform transfers min A x 1 with RW while maintaining NWB LLE        Start:  04/18/24    Expected End:  05/02/24            Pt. will ambulate > 10 ft. with min A and RW for short household ambulation        Start:  04/18/24    Expected End:  05/02/24            Pt. will ascend/descend 5 steps with mod A x 1 to safely enter/exit the home set up        Start:  04/18/24    Expected End:  05/02/24                 Education Documentation  Precautions, taught by Tiffany Fuentes, PT at 4/18/2024 10:09 AM.  Learner: Patient  Readiness: Acceptance  Method: Explanation  Response: Verbalizes Understanding, Needs Reinforcement    Body Mechanics, taught by Tiffany Fuentes, PT at 4/18/2024 10:09 AM.  Learner: Patient  Readiness: Acceptance  Method: Explanation  Response: Verbalizes Understanding, Needs Reinforcement    Mobility Training, taught by Tiffany Fuentes, PT at 4/18/2024 10:09 AM.  Learner: Patient  Readiness: Acceptance  Method: Explanation  Response: Verbalizes Understanding, Needs Reinforcement

## 2024-04-18 NOTE — PROGRESS NOTES
Occupational Therapy                 Therapy Communication Note    Patient Name: Nuris Squires  MRN: [id number]  Today's Date: 4/18/2024     Discipline: Occupational Therapy    Missed Visit Reason: Patient sleeping (OT for ADL assessment to assist with dc planning. Pt supine,HOB elevated and breakfast in place;TV on high volume. Pt appeared to be sleeping.  TV volume significantly decreased,vc's and light touch used to wake pt unsuccessful. Eval not complete.)    Comment: Eval attempt

## 2024-04-18 NOTE — PROGRESS NOTES
Per [facility] update, patient declined SNF and prefers to discharge home with homecare when medically ready for dc. No further SW needs at this time. SW will continue to follow.    DELORIS Valencia

## 2024-04-19 LAB
ALBUMIN SERPL BCP-MCNC: 3 G/DL (ref 3.4–5)
ANION GAP SERPL CALC-SCNC: 15 MMOL/L (ref 10–20)
BASOPHILS # BLD AUTO: 0.03 X10*3/UL (ref 0–0.1)
BASOPHILS NFR BLD AUTO: 0.3 %
BUN SERPL-MCNC: 37 MG/DL (ref 6–23)
CALCIUM SERPL-MCNC: 8.2 MG/DL (ref 8.6–10.6)
CHLORIDE SERPL-SCNC: 103 MMOL/L (ref 98–107)
CO2 SERPL-SCNC: 22 MMOL/L (ref 21–32)
CREAT SERPL-MCNC: 2.2 MG/DL (ref 0.5–1.05)
EGFRCR SERPLBLD CKD-EPI 2021: 25 ML/MIN/1.73M*2
EOSINOPHIL # BLD AUTO: 0.13 X10*3/UL (ref 0–0.7)
EOSINOPHIL NFR BLD AUTO: 1.3 %
ERYTHROCYTE [DISTWIDTH] IN BLOOD BY AUTOMATED COUNT: 13 % (ref 11.5–14.5)
GLUCOSE BLD MANUAL STRIP-MCNC: 118 MG/DL (ref 74–99)
GLUCOSE BLD MANUAL STRIP-MCNC: 179 MG/DL (ref 74–99)
GLUCOSE BLD MANUAL STRIP-MCNC: 211 MG/DL (ref 74–99)
GLUCOSE BLD MANUAL STRIP-MCNC: 214 MG/DL (ref 74–99)
GLUCOSE BLD MANUAL STRIP-MCNC: 219 MG/DL (ref 74–99)
GLUCOSE SERPL-MCNC: 189 MG/DL (ref 74–99)
HCT VFR BLD AUTO: 22.4 % (ref 36–46)
HGB BLD-MCNC: 7 G/DL (ref 12–16)
IMM GRANULOCYTES # BLD AUTO: 0.05 X10*3/UL (ref 0–0.7)
IMM GRANULOCYTES NFR BLD AUTO: 0.5 % (ref 0–0.9)
LYMPHOCYTES # BLD AUTO: 2.35 X10*3/UL (ref 1.2–4.8)
LYMPHOCYTES NFR BLD AUTO: 23.2 %
MAGNESIUM SERPL-MCNC: 2.49 MG/DL (ref 1.6–2.4)
MCH RBC QN AUTO: 29 PG (ref 26–34)
MCHC RBC AUTO-ENTMCNC: 31.3 G/DL (ref 32–36)
MCV RBC AUTO: 93 FL (ref 80–100)
MONOCYTES # BLD AUTO: 0.95 X10*3/UL (ref 0.1–1)
MONOCYTES NFR BLD AUTO: 9.4 %
NEUTROPHILS # BLD AUTO: 6.62 X10*3/UL (ref 1.2–7.7)
NEUTROPHILS NFR BLD AUTO: 65.3 %
NRBC BLD-RTO: 0 /100 WBCS (ref 0–0)
PHOSPHATE SERPL-MCNC: 4.4 MG/DL (ref 2.5–4.9)
PLATELET # BLD AUTO: 243 X10*3/UL (ref 150–450)
POTASSIUM SERPL-SCNC: 4.9 MMOL/L (ref 3.5–5.3)
RBC # BLD AUTO: 2.41 X10*6/UL (ref 4–5.2)
SODIUM SERPL-SCNC: 135 MMOL/L (ref 136–145)
WBC # BLD AUTO: 10.1 X10*3/UL (ref 4.4–11.3)

## 2024-04-19 PROCEDURE — 97530 THERAPEUTIC ACTIVITIES: CPT | Mod: GP | Performed by: PHYSICAL THERAPIST

## 2024-04-19 PROCEDURE — 2500000004 HC RX 250 GENERAL PHARMACY W/ HCPCS (ALT 636 FOR OP/ED)

## 2024-04-19 PROCEDURE — 36415 COLL VENOUS BLD VENIPUNCTURE: CPT | Performed by: NURSE PRACTITIONER

## 2024-04-19 PROCEDURE — 82947 ASSAY GLUCOSE BLOOD QUANT: CPT

## 2024-04-19 PROCEDURE — 1100000001 HC PRIVATE ROOM DAILY

## 2024-04-19 PROCEDURE — 2500000002 HC RX 250 W HCPCS SELF ADMINISTERED DRUGS (ALT 637 FOR MEDICARE OP, ALT 636 FOR OP/ED)

## 2024-04-19 PROCEDURE — 2500000001 HC RX 250 WO HCPCS SELF ADMINISTERED DRUGS (ALT 637 FOR MEDICARE OP)

## 2024-04-19 PROCEDURE — 97165 OT EVAL LOW COMPLEX 30 MIN: CPT | Mod: GO

## 2024-04-19 PROCEDURE — 97530 THERAPEUTIC ACTIVITIES: CPT | Mod: GO

## 2024-04-19 PROCEDURE — 80069 RENAL FUNCTION PANEL: CPT | Performed by: NURSE PRACTITIONER

## 2024-04-19 PROCEDURE — 83735 ASSAY OF MAGNESIUM: CPT | Performed by: NURSE PRACTITIONER

## 2024-04-19 PROCEDURE — 85025 COMPLETE CBC W/AUTO DIFF WBC: CPT | Performed by: NURSE PRACTITIONER

## 2024-04-19 RX ORDER — OXYCODONE HYDROCHLORIDE 5 MG/1
5 TABLET ORAL EVERY 4 HOURS PRN
Status: DISCONTINUED | OUTPATIENT
Start: 2024-04-19 | End: 2024-04-22

## 2024-04-19 RX ORDER — OXYCODONE HYDROCHLORIDE 5 MG/1
2.5 TABLET ORAL EVERY 6 HOURS PRN
Status: DISCONTINUED | OUTPATIENT
Start: 2024-04-19 | End: 2024-04-23

## 2024-04-19 RX ADMIN — GABAPENTIN 300 MG: 300 CAPSULE ORAL at 21:09

## 2024-04-19 RX ADMIN — ENOXAPARIN SODIUM 40 MG: 100 INJECTION SUBCUTANEOUS at 17:08

## 2024-04-19 RX ADMIN — INSULIN LISPRO 10 UNITS: 100 INJECTION, SOLUTION INTRAVENOUS; SUBCUTANEOUS at 17:09

## 2024-04-19 RX ADMIN — GABAPENTIN 300 MG: 300 CAPSULE ORAL at 08:38

## 2024-04-19 RX ADMIN — FOLIC ACID 1 MG: 1 TABLET ORAL at 08:38

## 2024-04-19 RX ADMIN — INSULIN LISPRO 10 UNITS: 100 INJECTION, SOLUTION INTRAVENOUS; SUBCUTANEOUS at 08:39

## 2024-04-19 RX ADMIN — ACETAMINOPHEN 975 MG: 325 TABLET ORAL at 05:08

## 2024-04-19 RX ADMIN — AMLODIPINE BESYLATE 10 MG: 10 TABLET ORAL at 08:38

## 2024-04-19 RX ADMIN — ENOXAPARIN SODIUM 40 MG: 100 INJECTION SUBCUTANEOUS at 05:09

## 2024-04-19 RX ADMIN — INSULIN GLARGINE 12 UNITS: 100 INJECTION, SOLUTION SUBCUTANEOUS at 21:05

## 2024-04-19 RX ADMIN — INSULIN LISPRO 4 UNITS: 100 INJECTION, SOLUTION INTRAVENOUS; SUBCUTANEOUS at 21:05

## 2024-04-19 RX ADMIN — INSULIN LISPRO 4 UNITS: 100 INJECTION, SOLUTION INTRAVENOUS; SUBCUTANEOUS at 17:09

## 2024-04-19 RX ADMIN — Medication 2000 UNITS: at 05:09

## 2024-04-19 RX ADMIN — ATORVASTATIN CALCIUM 40 MG: 40 TABLET, FILM COATED ORAL at 08:38

## 2024-04-19 RX ADMIN — ACETAMINOPHEN 975 MG: 325 TABLET ORAL at 17:10

## 2024-04-19 RX ADMIN — ASPIRIN 81 MG 81 MG: 81 TABLET ORAL at 08:38

## 2024-04-19 RX ADMIN — INSULIN LISPRO 2 UNITS: 100 INJECTION, SOLUTION INTRAVENOUS; SUBCUTANEOUS at 08:39

## 2024-04-19 ASSESSMENT — COGNITIVE AND FUNCTIONAL STATUS - GENERAL
MOVING TO AND FROM BED TO CHAIR: TOTAL
MOBILITY SCORE: 6
MOVING FROM LYING ON BACK TO SITTING ON SIDE OF FLAT BED WITH BEDRAILS: TOTAL
EATING MEALS: A LITTLE
DRESSING REGULAR LOWER BODY CLOTHING: TOTAL
HELP NEEDED FOR BATHING: A LOT
CLIMB 3 TO 5 STEPS WITH RAILING: TOTAL
PERSONAL GROOMING: A LITTLE
TURNING FROM BACK TO SIDE WHILE IN FLAT BAD: TOTAL
WALKING IN HOSPITAL ROOM: TOTAL
DRESSING REGULAR UPPER BODY CLOTHING: A LITTLE
TOILETING: TOTAL
DAILY ACTIVITIY SCORE: 13
STANDING UP FROM CHAIR USING ARMS: TOTAL

## 2024-04-19 ASSESSMENT — PAIN - FUNCTIONAL ASSESSMENT: PAIN_FUNCTIONAL_ASSESSMENT: 0-10

## 2024-04-19 ASSESSMENT — PAIN SCALES - GENERAL: PAINLEVEL_OUTOF10: 0 - NO PAIN

## 2024-04-19 NOTE — PROGRESS NOTES
Occupational Therapy    Evaluation and Treatment     Patient Name: Nuris Squires  MRN: [id number]  Today's Date: 4/19/2024  Time Calculation  Start Time: 0907  Stop Time: 0933  Time Calculation (min): 26 min    Assessment  IP OT Assessment  OT Assessment: Pt presents with impaired ability to complete bed mobility, transfers, and ADLs.  Prognosis: Good  Barriers to Discharge: None  Evaluation/Treatment Tolerance: Patient tolerated treatment well  Medical Staff Made Aware: Yes  End of Session Communication: Bedside nurse  End of Session Patient Position: Bed, 3 rail up, Alarm on  Plan:  Treatment Interventions: ADL retraining, Functional transfer training  OT Frequency: 4 times per week  OT Discharge Recommendations: Moderate intensity level of continued care  OT Recommended Transfer Status: Dependent, Assist of 2  OT - OK to Discharge: Yes    Subjective     Current Problem:  1. Other closed fracture of left femur, unspecified portion of femur, initial encounter (Multi)  Case Request Operating Room: Open Reduction Internal Fixation Femur, Insertion Intramedullary Nail Femur    Case Request Operating Room: Open Reduction Internal Fixation Femur, Insertion Intramedullary Nail Femur          General:  Reason for Referral: s/p L femur ORIF and rIMN  Past Medical History Relevant to Rehab: DM, HTN, obesity, charcot R foot sp ankle fusion  Prior to Session Communication: Bedside nurse  Patient Position Received: Bed, 3 rail up, Alarm on  Family/Caregiver Present: No  General Comment: Supine with HOB elevated at the start of the session.     Precautions:  LE Weight Bearing Status: Left Non-Weight Bearing  Medical Precautions: Fall precautions    Pain:  Pain Assessment  Pain Assessment: 0-10  Pain Score:  (not rated.)  Pain Type: Acute pain, Surgical pain  Pain Location: Leg  Pain Orientation: Left  Pain Interventions: Repositioned      Objective   Cognition:  Overall Cognitive Status: Within Functional Limits  Orientation  Level: Oriented X4    Home Living:  Type of Home: House  Lives With: Significant other  Home Adaptive Equipment: Walker rolling or standard, Cane  Home Layout: One level  Home Access: Stairs to enter with rails  Entrance Stairs-Rails: Both     Prior Function:  Level of [place]: Independent with ADLs and functional transfers, Needs assistance with homemaking    ADL:  Eating Assistance: Stand by  Eating Deficit: Setup  Grooming Assistance: Minimal  Grooming Deficit: Setup (anticipate)  UE Dressing Assistance: Minimal  UE Dressing Deficit: Thread RUE, Thread LUE  LE Dressing Assistance: Total  LE Dressing Deficit: Don/doff R sock, Don/doff L sock (anticipate)  Toileting Assistance with Device: Total  Toileting Deficit:  (in supine and side-lying)    Activity Tolerance:  Endurance: Decreased tolerance for upright activites    Balance:  Dynamic Sitting Balance  Dynamic Sitting-Balance Support: Bilateral upper extremity supported  Dynamic Sitting-Balance:  (dependent x 2 to scoot towards the side of the bd. Pt presented difficulties scooting back in bed, and required max assist x 2 to return back to bed.)  Dynamic Sitting-Comments: increased time provided to complete bed mobility and to attempt standing from an elevated surface.  Static Sitting Balance  Static Sitting-Balance Support: Bilateral upper extremity supported  Static Sitting-Level of Assistance: Moderate assistance  Static Sitting-Comment/Number of Minutes: presented with forward lean, pt is at high risk for falls.    Bed Mobility/Transfers: Bed Mobility  Bed Mobility: Yes  Bed Mobility 1  Bed Mobility 1: Supine to sitting  Level of Assistance 1: Dependent, Moderate verbal cues (x 2)  Bed Mobility Comments 1: with HOB elevated.  Bed Mobility 2  Bed Mobility  2: Rolling right  Level of Assistance 2: Maximum assistance, Minimal verbal cues  Bed Mobility 3  Bed Mobility 3: Scooting  Level of Assistance 3: Dependent  Bed Mobility Comments 3: x2 person assist    and Transfers  Transfer: No (Attempted sit to stand transfer with max assist x 2 from an elevated surface however, unable to complete with max assist x 2, pt was not able to maintain NWB LLE.)    Vision:     and Vision - Complex Assessment  Ocular Range of Motion: Within Functional Limits    Sensation:  Light Touch: No apparent deficits    Coordination:  Finger to Nose:  (Dysmetria)  Finger to [place]:  (presents with dysmetria L > R)     Hand Function:  Hand Function  Gross Grasp:  (B  4/5)    Extremities: RUE   RUE :  (R shoulder AROM ~ 60 degrees flex. R elbow to digits AROM WFL. R shoulder 3/5, R elbow to wrist 3+/5), LUE   LUE:  (L shoulder AROM ~ 60 degrees flex, L elbow to digits AROM WFL. L shoulder 3/5, L elbow to wrist 3+/5),  , and      Outcome Measures: [facility] Daily Activity  Putting on and taking off regular lower body clothing: Total  Bathing (including washing, rinsing, drying): A lot  Putting on and taking off regular upper body clothing: A little  Toileting, which includes using toilet, bedpan or urinal: Total  Taking care of personal grooming such as brushing teeth: A little  Eating Meals: A little  Daily Activity - Total Score: 13         ,     OT Adult Other Outcome Measures  4AT: negative    Education Documentation  Body Mechanics, taught by Pablito Holley OT at 4/19/2024 11:47 AM.  Learner: Patient  Readiness: Acceptance  Method: Explanation  Response: Verbalizes Understanding    Precautions, taught by Pablito Holley OT at 4/19/2024 11:47 AM.  Learner: Patient  Readiness: Acceptance  Method: Explanation  Response: Verbalizes Understanding    Home Exercise Program, taught by Pablito Holley OT at 4/19/2024 11:47 AM.  Learner: Patient  Readiness: Acceptance  Method: Explanation  Response: Verbalizes Understanding    ADL Training, taught by Pablito Holley OT at 4/19/2024 11:47 AM.  Learner: Patient  Readiness: Acceptance  Method: Explanation  Response: Verbalizes Understanding    Education  Comments  No comments found.        Goals:   Encounter Problems       Encounter Problems (Active)       ADLs       Patient will perform UB and LB bathing with moderate assist level of assistance and grab bars. (Progressing)       Start:  04/19/24    Expected End:  05/10/24            Patient with complete lower body dressing with moderate assist level of assistance donning and doffing all LE clothes  with PRN adaptive equipment while edge of bed  (Progressing)       Start:  04/19/24    Expected End:  05/10/24            Patient will complete toileting including hygiene clothing management/hygiene with moderate assist level of assistance and grab bars. (Progressing)       Start:  04/19/24    Expected End:  05/10/24               BALANCE       Pt will maintain dynamic standing balance during ADL task with moderate assist level of assistance in order to demonstrate decreased risk of falling and improved postural control. (Progressing)       Start:  04/19/24    Expected End:  05/10/24               EXERCISE/STRENGTHENING       Patient will complete BUE exercises for 15 reps in order to improve strength and activity for ADL performance.  (Progressing)       Start:  04/19/24    Expected End:  05/10/24               MOBILITY       Patient will perform Functional mobility min Household distances/Community Distances with moderate assist level of assistance and least restrictive device in order to improve safety and functional mobility. (Progressing)       Start:  04/19/24    Expected End:  05/10/24               TRANSFERS       Patient will perform bed mobility moderate assist level of assistance and bed rails in order to improve safety and independence with mobility (Progressing)       Start:  04/19/24    Expected End:  05/10/24            Patient will complete sit to stand transfer with moderate assist level of assistance and least restrictive device in order to improve safety and prepare for out of bed mobility.  (Progressing)       Start:  04/19/24    Expected End:  05/10/24                       04/19/24 at 11:51 AM   Pablito YUAN/L, OTJULIO  Rehab Office: [phone]

## 2024-04-19 NOTE — PROGRESS NOTES
I spoke to patient again about therapy's recommendation for moderate intensity therapy. She is agreeable to look at a list of Skilled Nursing Facilities that was provided. Pattie Montenegro RN

## 2024-04-19 NOTE — PROGRESS NOTES
..[facility]  TRAUMA SERVICE - PROGRESS NOTE    Patient Name: Nuris Squires  MRN: [id number]  Admit Date: 415  : 1965  AGE: 58 y.o.   GENDER: female  ==============================================================================  MECHANISM OF INJURY:   57 y/o F s/p mGLF onto L knee. Pt states she was cooking on the stove and twisted her knee ultimately falling onto her left knee. She denies dizziness, lightheadedness, or LOC at time of event. She does endorse falling more often, last fall about a month ago. She lives at home with boyfriend. She denies chest pain and SOB at time of exam. Pt has a pmh of DM, HTN. Pt is not on blood thinners.     LOC (yes/no?): No  Anticoagulant / Anti-platelet Rx? (for what dx?): No  Referring Facility Name (N/A for scene EMR run): N/A    INJURIES:   Comminuted and moderately displaced intra-articular fracture of the L distal femur     OTHER MEDICAL PROBLEMS:  HTN  DM    INCIDENTAL FINDINGS:  None    PROCEDURES:  : L femur ORIF    ==============================================================================  TODAY'S ASSESSMENT AND PLAN OF CARE:    #Comminuted and moderately displaced intra-articular fracture of the L distal femur    -->Ortho Consult, recs       -OR 16 with Ortho for ORIF of L femur       -NWB LLE, ROMAT  -Pain control: tylenol scheduled, prn oxy 5/10  -PT/OT rec'd SNF     ##Comorbids  -Med rec complete   -Cont home meds as appropriate, on amlodipine, aspirin, atorvastatin, clonidine, gabapentin, and sliding scale insulin  -glucose POCT for meals and as warranted     #FEN//GI  -voiding freely  -on Miralax  -diabetic regular diet  -zofran ordered     #DVT ppx  -LVX 40 mg BID (Crcl 72, BMI 41)  -SCD to right leg     Dispo: medically clear for discharge. Rec'd SNF by PT/OT. Patient now amendable to SNF.      Pt seen and discussed with attending, Dr. Subha Demarco MD  General Surgery PGY1  Trauma Surgery  Service    ==============================================================================  CHIEF COMPLAINT / OVERNIGHT EVENTS:   Overnight patient was a little disoriented per nursing reports. Evaluated by trauma team and seemed to be confused. Oxycodone dose was decreased and patients confusion resolved. No other overnight events. Patient reports her pain is well controlled. Denies n/v. Has not had a BM in a couple days but denies feeling constipated. Discussed the need for a facility for rehab and patient seems to understand the need and is amendable. No other acute patient concerns.    MEDICAL HISTORY / ROS:  Admission history and ROS reviewed. Pertinent changes as follows:  none    PHYSICAL EXAM:  Heart Rate:  [84-99]   Temp:  [36.2 °C (97.2 °F)-37.2 °C (99 °F)]   Resp:  [16-18]   BP: (116-146)/(68-82)   SpO2:  [92 %-96 %]   Physical Exam  Constitutional:       Appearance: Normal appearance.   HENT:      Head: Normocephalic and atraumatic.      Right Ear: External ear normal.      Left Ear: External ear normal.      Nose: Nose normal.      Mouth/Throat:      Mouth: Mucous membranes are moist.   Eyes:      Pupils: Pupils are equal, round, and reactive to light.   Cardiovascular:      Rate and Rhythm: Normal rate and regular rhythm.      Pulses: Normal pulses.      Heart sounds: Normal heart sounds.   Pulmonary:      Effort: Pulmonary effort is normal.      Breath sounds: Normal breath sounds.   Abdominal:      General: Bowel sounds are normal. There is no distension.      Palpations: Abdomen is soft.      Tenderness: There is no abdominal tenderness.   Musculoskeletal:      Cervical back: Normal range of motion and neck supple.      Comments: TTP of left femur, LLE wrapped, NVI   Skin:     General: Skin is warm and dry.      Capillary Refill: Capillary refill takes less than 2 seconds.   Neurological:      Mental Status: She is alert and oriented to person, place, and time.   Psychiatric:         Mood and Affect:  Mood normal.         Behavior: Behavior normal.         IMAGING SUMMARY:  (summary of new imaging findings, not a copy of dictation)  No new imaging    LABS:  Results from last 7 days   Lab Units 04/19/24  0547 04/17/24  0901 04/16/24  0637 04/15/24  2128   WBC AUTO x10*3/uL 10.1 8.7 9.2 9.6   HEMOGLOBIN g/dL 7.0* 7.1* 8.7* 10.6*   HEMATOCRIT % 22.4* 21.5* 27.2* 31.6*   PLATELETS AUTO x10*3/uL 243 217 256 318   NEUTROS PCT AUTO % 65.3  --   --  75.7   LYMPHS PCT AUTO % 23.2  --   --  18.3   MONOS PCT AUTO % 9.4  --   --  4.5   EOS PCT AUTO % 1.3  --   --  0.6     Results from last 7 days   Lab Units 04/15/24  2128   APTT seconds 31   INR  0.9     Results from last 7 days   Lab Units 04/19/24  0636 04/17/24  0901 04/15/24  2128   SODIUM mmol/L 135* 134* 136   POTASSIUM mmol/L 4.9 4.5 4.2   CHLORIDE mmol/L 103 102 103   CO2 mmol/L 22 23 26   BUN mg/dL 37* 31* 30*   CREATININE mg/dL 2.20* 2.03* 1.36*   CALCIUM mg/dL 8.2* 8.4* 8.9   PROTEIN TOTAL g/dL  --   --  6.9   BILIRUBIN TOTAL mg/dL  --   --  0.4   ALK PHOS U/L  --   --  120*   ALT U/L  --   --  23   AST U/L  --   --  22   GLUCOSE mg/dL 189* 216* 399*     Results from last 7 days   Lab Units 04/15/24  2128   BILIRUBIN TOTAL mg/dL 0.4           I have reviewed all medications, laboratory results, and imaging pertinent for today's encounter.

## 2024-04-19 NOTE — PROGRESS NOTES
Physical Therapy    Physical Therapy Treatment    Patient Name: Nuris Squires  MRN: [id number]  Today's Date: 4/19/2024   Room: [address] [place], [place] [address]  Time Calculation  Start Time: 1046  Stop Time: 1102  Time Calculation (min): 16 min    Assessment/Plan   PT Assessment  Evaluation/Treatment Tolerance:  (fair tx tolerance, limited by fatigue)  Medical Staff Made Aware: Yes  End of Session Communication: Bedside nurse  Assessment Comment: pt was max A for mobility at the bed level. she was able to perform bed transfer wiht max A x 1, and verbal cues. she remains appropriate for skilled PT while in house.  End of Session Patient Position: Bed, 3 rail up, Alarm off, not on at start of session  IP OR SWING BED PT PLAN  Inpatient or Swing Bed: Inpatient  PT Plan  PT Discharge Recommendations: Moderate intensity level of continued care  PT Recommended Transfer Status: Assist x2  PT - OK to Discharge: Yes      Subjective   General Visit Information:  Reason for Referral: s/p L femur ORIF and rIMN  Past Medical History Relevant to Rehab: pmh of DM, HTN, obesity, charcot R foot sp ankle fusion  Prior to Session Communication: Bedside nurse  Patient Position Received: Bed, 3 rail up, Alarm off, not on at start of session  General Comment: supine in bed, agreeable to PT. max A x 1 to EOB, ubable to sit without lying back onto bed while EOB   Home Living:  Home Living  Type of Home: House  Lives With: Significant other  Home Adaptive Equipment: Walker rolling or standard, Cane  Home Layout: One level  Home Access: Stairs to enter with rails  Entrance Stairs-Rails: Both  Entrance Stairs-Number of Steps: 5  Prior Level of Function:  Prior Function Per Pt/Caregiver Report  Level of [place]: Independent with ADLs and functional transfers, Needs assistance with homemaking  ADL Assistance: Independent  Homemaking Assistance: Needs assistance (boyfriend assists with cooking/cleaning)  Ambulatory Assistance: Independent (uses SC for community  ambulation)  Precautions:  Precautions  LE Weight Bearing Status: Left Non-Weight Bearing  Medical Precautions: Fall precautions  Vital Signs:       Objective     Pain:  Pain Assessment  Pain Assessment:  (did not rate, pain with mobility, none at rest)                    Static Sitting Balance  Static Sitting-Balance Support: Bilateral upper extremity supported  Static Sitting-Level of Assistance: Maximum assistance  Static Sitting-Comment/Number of Minutes: pt intermittently min A when able to hold onto bedrails, without bed rail support was Max and lying back into supine. work on trying to maintian upright posture but unable to without heavy assist, HOB elevated and support of bedrail; sat ~ 8 mins       Functional Assessments:  Bed Mobility  Bed Mobility: Yes  Bed Mobility 1  Bed Mobility 1: Supine to sitting, Sitting to supine  Level of Assistance 1: Maximum assistance, Maximum verbal cues  Bed Mobility Comments 1: HOB elevated, step by step instruction on use of BUE and RLE to get to EOB, supported LLE and provided UE support with use of draw sheet to complete seated transfer.  Bed Mobility 2  Bed Mobility  2: Rolling right, Rolling left  Level of Assistance 2: Maximum assistance, Moderate verbal cues  Bed Mobility 3  Bed Mobility 3: Scooting  Level of Assistance 3: Maximum assistance (x2)  Transfers  Transfer: No (unable)             Outcome Measures:  [facility] Basic Mobility  Turning from your back to your side while in a flat bed without using bedrails: Total  Moving from lying on your back to sitting on the side of a flat bed without using bedrails: Total  Moving to and from bed to chair (including a wheelchair): Total  Standing up from a chair using your arms (e.g. wheelchair or bedside chair): Total  To walk in hospital room: Total  Climbing 3-5 steps with railing: Total  Basic Mobility - Total Score: 6                               Encounter Problems       Encounter Problems (Active)       PT Problem        Pt. will perform bed mobility with min A x 1  (Progressing)       Start:  04/18/24    Expected End:  05/02/24            Pt. will perform transfers min A x 1 with RW while maintaining NWB LLE  (Progressing)       Start:  04/18/24    Expected End:  05/02/24            Pt. will ambulate > 10 ft. with min A and RW for short household ambulation  (Progressing)       Start:  04/18/24    Expected End:  05/02/24            Pt. will ascend/descend 5 steps with mod A x 1 to safely enter/exit the home set up  (Progressing)       Start:  04/18/24    Expected End:  05/02/24                   Education Documentation  Precautions, taught by Marleny Roper PT at 4/19/2024 11:10 AM.  Learner: Patient  Readiness: Acceptance  Method: Explanation, Demonstration  Response: Needs Reinforcement    Body Mechanics, taught by Marleny Roper, PT at 4/19/2024 11:10 AM.  Learner: Patient  Readiness: Acceptance  Method: Explanation, Demonstration  Response: Needs Reinforcement    Mobility Training, taught by Marleny Roper PT at 4/19/2024 11:10 AM.  Learner: Patient  Readiness: Acceptance  Method: Explanation, Demonstration  Response: Needs Reinforcement    Education Comments  No comments found.            04/19/24 at 11:11 AM   Marleny Roper PT   Rehab Office: [phone]

## 2024-04-20 LAB
GLUCOSE BLD MANUAL STRIP-MCNC: 104 MG/DL (ref 74–99)
GLUCOSE BLD MANUAL STRIP-MCNC: 129 MG/DL (ref 74–99)
GLUCOSE BLD MANUAL STRIP-MCNC: 264 MG/DL (ref 74–99)
GLUCOSE BLD MANUAL STRIP-MCNC: 273 MG/DL (ref 74–99)

## 2024-04-20 PROCEDURE — 2500000004 HC RX 250 GENERAL PHARMACY W/ HCPCS (ALT 636 FOR OP/ED)

## 2024-04-20 PROCEDURE — 99232 SBSQ HOSP IP/OBS MODERATE 35: CPT | Performed by: SURGERY

## 2024-04-20 PROCEDURE — 2500000001 HC RX 250 WO HCPCS SELF ADMINISTERED DRUGS (ALT 637 FOR MEDICARE OP)

## 2024-04-20 PROCEDURE — 2500000002 HC RX 250 W HCPCS SELF ADMINISTERED DRUGS (ALT 637 FOR MEDICARE OP, ALT 636 FOR OP/ED)

## 2024-04-20 PROCEDURE — 1100000001 HC PRIVATE ROOM DAILY

## 2024-04-20 PROCEDURE — 2500000001 HC RX 250 WO HCPCS SELF ADMINISTERED DRUGS (ALT 637 FOR MEDICARE OP): Performed by: NURSE PRACTITIONER

## 2024-04-20 PROCEDURE — 82947 ASSAY GLUCOSE BLOOD QUANT: CPT

## 2024-04-20 RX ADMIN — FOLIC ACID 1 MG: 1 TABLET ORAL at 08:56

## 2024-04-20 RX ADMIN — ACETAMINOPHEN 975 MG: 325 TABLET ORAL at 12:00

## 2024-04-20 RX ADMIN — CLONIDINE HYDROCHLORIDE 0.1 MG: 0.1 TABLET ORAL at 21:28

## 2024-04-20 RX ADMIN — ASPIRIN 81 MG 81 MG: 81 TABLET ORAL at 08:56

## 2024-04-20 RX ADMIN — GABAPENTIN 300 MG: 300 CAPSULE ORAL at 21:21

## 2024-04-20 RX ADMIN — ACETAMINOPHEN 975 MG: 325 TABLET ORAL at 05:50

## 2024-04-20 RX ADMIN — Medication 2000 UNITS: at 05:50

## 2024-04-20 RX ADMIN — ACETAMINOPHEN 975 MG: 325 TABLET ORAL at 18:00

## 2024-04-20 RX ADMIN — ENOXAPARIN SODIUM 40 MG: 100 INJECTION SUBCUTANEOUS at 18:00

## 2024-04-20 RX ADMIN — INSULIN GLARGINE 12 UNITS: 100 INJECTION, SOLUTION SUBCUTANEOUS at 21:04

## 2024-04-20 RX ADMIN — INSULIN LISPRO 10 UNITS: 100 INJECTION, SOLUTION INTRAVENOUS; SUBCUTANEOUS at 17:00

## 2024-04-20 RX ADMIN — INSULIN LISPRO 6 UNITS: 100 INJECTION, SOLUTION INTRAVENOUS; SUBCUTANEOUS at 21:20

## 2024-04-20 RX ADMIN — ACETAMINOPHEN 975 MG: 325 TABLET ORAL at 23:33

## 2024-04-20 RX ADMIN — ATORVASTATIN CALCIUM 40 MG: 40 TABLET, FILM COATED ORAL at 08:56

## 2024-04-20 RX ADMIN — OXYCODONE HYDROCHLORIDE 2.5 MG: 5 TABLET ORAL at 21:28

## 2024-04-20 RX ADMIN — ENOXAPARIN SODIUM 40 MG: 100 INJECTION SUBCUTANEOUS at 05:50

## 2024-04-20 RX ADMIN — POLYETHYLENE GLYCOL 3350 17 G: 17 POWDER, FOR SOLUTION ORAL at 08:56

## 2024-04-20 RX ADMIN — INSULIN LISPRO 6 UNITS: 100 INJECTION, SOLUTION INTRAVENOUS; SUBCUTANEOUS at 17:10

## 2024-04-20 RX ADMIN — GABAPENTIN 300 MG: 300 CAPSULE ORAL at 08:56

## 2024-04-20 RX ADMIN — CLONIDINE HYDROCHLORIDE 0.1 MG: 0.1 TABLET ORAL at 08:56

## 2024-04-20 RX ADMIN — AMLODIPINE BESYLATE 10 MG: 10 TABLET ORAL at 08:56

## 2024-04-20 ASSESSMENT — PAIN - FUNCTIONAL ASSESSMENT
PAIN_FUNCTIONAL_ASSESSMENT: 0-10

## 2024-04-20 ASSESSMENT — PAIN SCALES - GENERAL
PAINLEVEL_OUTOF10: 4

## 2024-04-20 NOTE — CARE PLAN
The patient's goals for the shift include pain control    The clinical goals for the shift include pain well controlled      Problem: Pain  Goal: My pain/discomfort is manageable  Outcome: Progressing     Problem: Safety  Goal: Patient will be injury free during hospitalization  Outcome: Progressing  Goal: I will remain free of falls  Outcome: Progressing     Problem: Daily Care  Goal: Daily care needs are met  Outcome: Progressing     Problem: Psychosocial Needs  Goal: Demonstrates ability to cope with hospitalization/illness  Outcome: Progressing  Goal: Collaborate with me, my family, and caregiver to identify my specific goals  Outcome: Progressing     Problem: Discharge Barriers  Goal: My discharge needs are met  Outcome: Progressing     Problem: Skin  Goal: Decreased wound size/increased tissue granulation at next dressing change  Outcome: Progressing  Goal: Participates in plan/prevention/treatment measures  Outcome: Progressing  Goal: Prevent/manage excess moisture  Outcome: Progressing  Goal: Prevent/minimize sheer/friction injuries  Outcome: Progressing  Goal: Promote/optimize nutrition  Outcome: Progressing  Goal: Promote skin healing  Outcome: Progressing

## 2024-04-20 NOTE — PROGRESS NOTES
...[facility]  TRAUMA SERVICE - PROGRESS NOTE    Patient Name: Nuris Squires  MRN: [id number]  Admit Date: 415  : 1965  AGE: 58 y.o.   GENDER: female  ==============================================================================  MECHANISM OF INJURY:   59 y/o F s/p mGLF onto L knee. Pt states she was cooking on the stove and twisted her knee ultimately falling onto her left knee. She denies dizziness, lightheadedness, or LOC at time of event. She does endorse falling more often, last fall about a month ago. She lives at home with boyfriend. She denies chest pain and SOB at time of exam. Pt has a pmh of DM, HTN. Pt is not on blood thinners.     LOC (yes/no?): No  Anticoagulant / Anti-platelet Rx? (for what dx?): No  Referring Facility Name (N/A for scene EMR run): N/A    INJURIES:   Comminuted and moderately displaced intra-articular fracture of the L distal femur     OTHER MEDICAL PROBLEMS:  HTN  DM    INCIDENTAL FINDINGS:  None    PROCEDURES:  : L femur ORIF    ==============================================================================  TODAY'S ASSESSMENT AND PLAN OF CARE:    #Comminuted and moderately displaced intra-articular fracture of the L distal femur    -->Ortho Consult, recs       -OR 16 with Ortho for ORIF of L femur       -NWB LLE, ROMAT  -Pain control: tylenol scheduled, prn oxy 2.5/5 (reduced for drowsiness)  -PT/OT rec'd SNF     ##Comorbids  -Med rec complete   -Cont home meds as appropriate, on amlodipine, aspirin, atorvastatin, clonidine, gabapentin, and sliding scale insulin  -glucose POCT for meals and as warranted     #FEN//GI  -voiding freely  -on Miralax  -diabetic regular diet  -zofran ordered     #DVT ppx  -LVX 40 mg BID (Crcl 72, BMI 41)  -SCD to right leg     Dispo: medically clear for discharge. Rec'd SNF by PT/OT. Patient now amendable to SNF and live-in boyfriend agrees. Patient will need a list of SNF choices.     Pt seen and  "discussed with attending, Dr. Subha Demarco MD  General Surgery PGY1  Trauma Surgery Service    ==============================================================================  CHIEF COMPLAINT / OVERNIGHT EVENTS:   Boyfriend reported yesterday patient seems more tired and \"loopy\" than she is at baseline. No acute overnight events. Patient reports pain is well controlled. Tolerating diet. No other acute patient concerns.    MEDICAL HISTORY / ROS:  Admission history and ROS reviewed. Pertinent changes as follows:  none    PHYSICAL EXAM:  Heart Rate:  [86-95]   Temp:  [36.6 °C (97.9 °F)-37 °C (98.6 °F)]   Resp:  [16-20]   BP: (120-140)/(71-82)   SpO2:  [90 %-97 %]   Physical Exam  Constitutional:       Appearance: Normal appearance.   HENT:      Head: Normocephalic and atraumatic.      Right Ear: External ear normal.      Left Ear: External ear normal.      Nose: Nose normal.      Mouth/Throat:      Mouth: Mucous membranes are moist.   Eyes:      Pupils: Pupils are equal, round, and reactive to light.   Cardiovascular:      Rate and Rhythm: Normal rate and regular rhythm.      Pulses: Normal pulses.      Heart sounds: Normal heart sounds.   Pulmonary:      Effort: Pulmonary effort is normal.      Breath sounds: Normal breath sounds.   Abdominal:      General: Bowel sounds are normal. There is no distension.      Palpations: Abdomen is soft.      Tenderness: There is no abdominal tenderness.   Musculoskeletal:      Cervical back: Normal range of motion and neck supple.      Comments: TTP of left femur, LLE wrapped, NVI   Skin:     General: Skin is warm and dry.      Capillary Refill: Capillary refill takes less than 2 seconds.   Neurological:      Mental Status: She is alert and oriented to person, place, and time.   Psychiatric:         Mood and Affect: Mood normal.         Behavior: Behavior normal.         IMAGING SUMMARY:  (summary of new imaging findings, not a copy of dictation)  No new " imaging    LABS:  Results from last 7 days   Lab Units 04/19/24  0547 04/17/24  0901 04/16/24  0637 04/15/24  2128   WBC AUTO x10*3/uL 10.1 8.7 9.2 9.6   HEMOGLOBIN g/dL 7.0* 7.1* 8.7* 10.6*   HEMATOCRIT % 22.4* 21.5* 27.2* 31.6*   PLATELETS AUTO x10*3/uL 243 217 256 318   NEUTROS PCT AUTO % 65.3  --   --  75.7   LYMPHS PCT AUTO % 23.2  --   --  18.3   MONOS PCT AUTO % 9.4  --   --  4.5   EOS PCT AUTO % 1.3  --   --  0.6     Results from last 7 days   Lab Units 04/15/24  2128   APTT seconds 31   INR  0.9     Results from last 7 days   Lab Units 04/19/24  0636 04/17/24  0901 04/15/24  2128   SODIUM mmol/L 135* 134* 136   POTASSIUM mmol/L 4.9 4.5 4.2   CHLORIDE mmol/L 103 102 103   CO2 mmol/L 22 23 26   BUN mg/dL 37* 31* 30*   CREATININE mg/dL 2.20* 2.03* 1.36*   CALCIUM mg/dL 8.2* 8.4* 8.9   PROTEIN TOTAL g/dL  --   --  6.9   BILIRUBIN TOTAL mg/dL  --   --  0.4   ALK PHOS U/L  --   --  120*   ALT U/L  --   --  23   AST U/L  --   --  22   GLUCOSE mg/dL 189* 216* 399*     Results from last 7 days   Lab Units 04/15/24  2128   BILIRUBIN TOTAL mg/dL 0.4           I have reviewed all medications, laboratory results, and imaging pertinent for today's encounter.

## 2024-04-21 LAB
GLUCOSE BLD MANUAL STRIP-MCNC: 149 MG/DL (ref 74–99)
GLUCOSE BLD MANUAL STRIP-MCNC: 170 MG/DL (ref 74–99)
GLUCOSE BLD MANUAL STRIP-MCNC: 186 MG/DL (ref 74–99)
GLUCOSE BLD MANUAL STRIP-MCNC: 226 MG/DL (ref 74–99)
GLUCOSE BLD MANUAL STRIP-MCNC: 262 MG/DL (ref 74–99)

## 2024-04-21 PROCEDURE — 2500000002 HC RX 250 W HCPCS SELF ADMINISTERED DRUGS (ALT 637 FOR MEDICARE OP, ALT 636 FOR OP/ED)

## 2024-04-21 PROCEDURE — 82947 ASSAY GLUCOSE BLOOD QUANT: CPT

## 2024-04-21 PROCEDURE — 2500000004 HC RX 250 GENERAL PHARMACY W/ HCPCS (ALT 636 FOR OP/ED)

## 2024-04-21 PROCEDURE — 1100000001 HC PRIVATE ROOM DAILY

## 2024-04-21 PROCEDURE — 2500000001 HC RX 250 WO HCPCS SELF ADMINISTERED DRUGS (ALT 637 FOR MEDICARE OP)

## 2024-04-21 PROCEDURE — 2500000001 HC RX 250 WO HCPCS SELF ADMINISTERED DRUGS (ALT 637 FOR MEDICARE OP): Performed by: NURSE PRACTITIONER

## 2024-04-21 PROCEDURE — 97110 THERAPEUTIC EXERCISES: CPT | Mod: GP

## 2024-04-21 PROCEDURE — 97530 THERAPEUTIC ACTIVITIES: CPT | Mod: GP

## 2024-04-21 RX ADMIN — INSULIN LISPRO 2 UNITS: 100 INJECTION, SOLUTION INTRAVENOUS; SUBCUTANEOUS at 11:00

## 2024-04-21 RX ADMIN — FOLIC ACID 1 MG: 1 TABLET ORAL at 09:07

## 2024-04-21 RX ADMIN — CLONIDINE HYDROCHLORIDE 0.1 MG: 0.1 TABLET ORAL at 09:00

## 2024-04-21 RX ADMIN — INSULIN LISPRO 10 UNITS: 100 INJECTION, SOLUTION INTRAVENOUS; SUBCUTANEOUS at 12:00

## 2024-04-21 RX ADMIN — ENOXAPARIN SODIUM 40 MG: 100 INJECTION SUBCUTANEOUS at 06:05

## 2024-04-21 RX ADMIN — INSULIN LISPRO 6 UNITS: 100 INJECTION, SOLUTION INTRAVENOUS; SUBCUTANEOUS at 21:44

## 2024-04-21 RX ADMIN — AMLODIPINE BESYLATE 10 MG: 10 TABLET ORAL at 09:07

## 2024-04-21 RX ADMIN — ATORVASTATIN CALCIUM 40 MG: 40 TABLET, FILM COATED ORAL at 09:07

## 2024-04-21 RX ADMIN — ASPIRIN 81 MG 81 MG: 81 TABLET ORAL at 09:07

## 2024-04-21 RX ADMIN — INSULIN LISPRO 3 UNITS: 100 INJECTION, SOLUTION INTRAVENOUS; SUBCUTANEOUS at 09:03

## 2024-04-21 RX ADMIN — GABAPENTIN 300 MG: 300 CAPSULE ORAL at 09:07

## 2024-04-21 RX ADMIN — GABAPENTIN 300 MG: 300 CAPSULE ORAL at 21:36

## 2024-04-21 RX ADMIN — ACETAMINOPHEN 975 MG: 325 TABLET ORAL at 21:36

## 2024-04-21 RX ADMIN — ACETAMINOPHEN 975 MG: 325 TABLET ORAL at 12:00

## 2024-04-21 RX ADMIN — OXYCODONE HYDROCHLORIDE 5 MG: 5 TABLET ORAL at 10:34

## 2024-04-21 RX ADMIN — Medication 2000 UNITS: at 06:05

## 2024-04-21 RX ADMIN — INSULIN GLARGINE 12 UNITS: 100 INJECTION, SOLUTION SUBCUTANEOUS at 21:25

## 2024-04-21 RX ADMIN — INSULIN LISPRO 10 UNITS: 100 INJECTION, SOLUTION INTRAVENOUS; SUBCUTANEOUS at 17:00

## 2024-04-21 RX ADMIN — INSULIN LISPRO 10 UNITS: 100 INJECTION, SOLUTION INTRAVENOUS; SUBCUTANEOUS at 09:04

## 2024-04-21 RX ADMIN — ENOXAPARIN SODIUM 40 MG: 100 INJECTION SUBCUTANEOUS at 21:36

## 2024-04-21 RX ADMIN — POLYETHYLENE GLYCOL 3350 17 G: 17 POWDER, FOR SOLUTION ORAL at 09:00

## 2024-04-21 ASSESSMENT — PAIN SCALES - GENERAL
PAINLEVEL_OUTOF10: 3
PAINLEVEL_OUTOF10: 6
PAINLEVEL_OUTOF10: 3
PAINLEVEL_OUTOF10: 3
PAINLEVEL_OUTOF10: 7
PAINLEVEL_OUTOF10: 5 - MODERATE PAIN

## 2024-04-21 ASSESSMENT — COGNITIVE AND FUNCTIONAL STATUS - GENERAL
TURNING FROM BACK TO SIDE WHILE IN FLAT BAD: A LOT
WALKING IN HOSPITAL ROOM: TOTAL
MOBILITY SCORE: 8
MOVING FROM LYING ON BACK TO SITTING ON SIDE OF FLAT BED WITH BEDRAILS: A LOT
MOVING TO AND FROM BED TO CHAIR: TOTAL
STANDING UP FROM CHAIR USING ARMS: TOTAL
CLIMB 3 TO 5 STEPS WITH RAILING: TOTAL

## 2024-04-21 ASSESSMENT — PAIN - FUNCTIONAL ASSESSMENT
PAIN_FUNCTIONAL_ASSESSMENT: 0-10

## 2024-04-21 NOTE — CARE PLAN
The patient's goals for the shift include pain control    The clinical goals for the shift include pain control    Over the shift, the patient did not make progress toward the following goals. Barriers to progression include n.a. Recommendations to address these barriers include n/a.

## 2024-04-21 NOTE — PROGRESS NOTES
Physical Therapy    Physical Therapy Treatment    Patient Name: Nuris Squires  MRN: [id number]  Today's Date: 4/21/2024  Time Calculation  Start Time: 1026  Stop Time: 1058  Time Calculation (min): 32 min     Assessment/Plan   PT Assessment  Evaluation/Treatment Tolerance: Patient limited by pain, Patient limited by fatigue  End of Session Communication: Bedside nurse  Assessment Comment: Pt was able to perform STS with max A x 2 and WW. Pt with difficulty maintaining precautions standing despite max vc - able to intermitently maintain. Patient continues to benefit from skilled physical therapy to maximize functional mobility and safety. Pt remains appropriate for mod intensity therapy when medically appropriate for DC.   End of Session Patient Position: Bed, 3 rail up, Alarm on  PT Plan  Inpatient/Swing Bed or Outpatient: Inpatient  PT Plan  Treatment/Interventions: Bed mobility, Transfer training, Gait training, Stair training, Balance training, Strengthening, Endurance training, Therapeutic exercise, Therapeutic activity, Home exercise program  PT Plan: Skilled PT  PT Frequency: 4 times per week  PT Discharge Recommendations: Moderate intensity level of continued care  PT Recommended Transfer Status: Assist x2  PT - OK to Discharge: Yes  RN cleared prior to session    General Visit Information:   PT  Visit  PT Received On: 04/21/24  Response to Previous Treatment: Patient reporting fatigue but able to participate.  General  Reason for Referral: s/p L femur ORIF and rIMN  Past Medical History Relevant to Rehab: DM, HTN, obesity, charcot R foot sp ankle fusion  Family/Caregiver Present: Yes  Caregiver Feedback: boyfriend present and supportive  Prior to Session Communication: Bedside nurse  Patient Position Received: Bed, 3 rail up, Alarm on  Preferred Learning Style: verbal  General Comment: Pt pleasant and agreeable to therapy    Subjective   Precautions:  Precautions  LE Weight Bearing Status: Left Non-Weight  Bearing  Medical Precautions: Fall precautions    Objective   Pain:  Pain Assessment  Pain Assessment: 0-10  Pain Score: 5 - Moderate pain  Pain Type: Acute pain, Surgical pain  Pain Location: Leg (and low back)  Pain Orientation: Left  Pain Interventions: Repositioned  Response to Interventions: no change  Cognition:  Cognition  Overall Cognitive Status: Within Functional Limits  Arousal/Alertness: Appropriate responses to stimuli  Orientation Level: Oriented X4  Following Commands: Follows one step commands with repetition  Postural Control:  Postural Control  Postural Control: Within Functional Limits (at times retropulsive in sitting)    Activity Tolerance:  Activity Tolerance  Endurance: Decreased tolerance for upright activites  Treatments:  Therapeutic Exercise  Therapeutic Exercise Performed: Yes  Therapeutic Exercise Activity 1: sitting RLE AP, marches, LAQ x 15    Therapeutic Activity  Therapeutic Activity Performed: Yes  Therapeutic Activity 1: static sitting EOB x 10 min with CGA  Therapeutic Activity 2: static standing 3 trials x 1 min, 4 min, 2 min with max A  x 2    Bed Mobility  Bed Mobility: Yes  Bed Mobility 1  Bed Mobility 1: Supine to sitting  Level of Assistance 1: Maximum assistance  Bed Mobility Comments 1: with HOB elevated; min vc for safety and sequencing; LLE assist and trunk assist  Bed Mobility 2  Bed Mobility  2: Sitting to supine  Level of Assistance 2: Moderate assistance  Bed Mobility Comments 2: min vc for safety and sequencing; pt able to get RLE on bed and needed LLE assist    Ambulation/Gait Training  Ambulation/Gait Training Performed: No  Transfers  Transfer: Yes  Transfer 1  Transfer From 1: Sit to, Stand to  Transfer to 1: Stand, Sit  Technique 1: Sit to stand, Stand to sit  Transfer Device 1: Walker  Transfer Level of Assistance 1: Maximum assistance, Maximum verbal cues (x2)  Trials/Comments 1: 3 trials; max vc for precautions , AD use, and posture ; pt with difficulty  maintaining precautions; trialed with max A x 1 and unable to achieve standing x 2 attempts    Outcome Measures:  [facility] Basic Mobility  Turning from your back to your side while in a flat bed without using bedrails: A lot  Moving from lying on your back to sitting on the side of a flat bed without using bedrails: A lot  Moving to and from bed to chair (including a wheelchair): Total  Standing up from a chair using your arms (e.g. wheelchair or bedside chair): Total  To walk in hospital room: Total  Climbing 3-5 steps with railing: Total  Basic Mobility - Total Score: 8    Education Documentation  Precautions, taught by Chio Underwood PT at 4/21/2024 11:25 AM.  Learner: Patient  Readiness: Acceptance  Method: Explanation, Demonstration  Response: Needs Reinforcement    Body Mechanics, taught by Chio Underwood PT at 4/21/2024 11:25 AM.  Learner: Patient  Readiness: Acceptance  Method: Explanation, Demonstration  Response: Needs Reinforcement    Mobility Training, taught by Chio Underwood PT at 4/21/2024 11:25 AM.  Learner: Patient  Readiness: Acceptance  Method: Explanation, Demonstration  Response: Needs Reinforcement    Education Comments  No comments found.      Encounter Problems       Encounter Problems (Active)       PT Problem       Pt. will perform bed mobility with min A x 1  (Progressing)       Start:  04/18/24    Expected End:  05/02/24            Pt. will perform transfers min A x 1 with RW while maintaining NWB LLE  (Progressing)       Start:  04/18/24    Expected End:  05/02/24            Pt. will ambulate > 10 ft. with min A and RW for short household ambulation  (Progressing)       Start:  04/18/24    Expected End:  05/02/24            Pt. will ascend/descend 5 steps with mod A x 1 to safely enter/exit the home set up  (Progressing)       Start:  04/18/24    Expected End:  05/02/24

## 2024-04-21 NOTE — PROGRESS NOTES
....[facility]  TRAUMA SERVICE - PROGRESS NOTE    Patient Name: Nuris Squires  MRN: [id number]  Admit Date: 415  : 1965  AGE: 58 y.o.   GENDER: female  ==============================================================================  MECHANISM OF INJURY:   57 y/o F s/p mGLF onto L knee. Pt states she was cooking on the stove and twisted her knee ultimately falling onto her left knee. She denies dizziness, lightheadedness, or LOC at time of event. She does endorse falling more often, last fall about a month ago. She lives at home with boyfriend. She denies chest pain and SOB at time of exam. Pt has a pmh of DM, HTN. Pt is not on blood thinners.     LOC (yes/no?): No  Anticoagulant / Anti-platelet Rx? (for what dx?): No  Referring Facility Name (N/A for scene EMR run): N/A    INJURIES:   Comminuted and moderately displaced intra-articular fracture of the L distal femur     OTHER MEDICAL PROBLEMS:  HTN  DM    INCIDENTAL FINDINGS:  None    PROCEDURES:  : L femur ORIF    ==============================================================================  TODAY'S ASSESSMENT AND PLAN OF CARE:    #Comminuted and moderately displaced intra-articular fracture of the L distal femur    -->Ortho Consult, recs       -OR 16 with Ortho for ORIF of L femur       -NWB LLE, ROMAT  -Pain control: tylenol scheduled, prn oxy 2.5/5 (reduced for drowsiness)  -PT/OT rec'd SNF     ##Comorbids  -Med rec complete   -Cont home meds as appropriate, on amlodipine, aspirin, atorvastatin, clonidine, gabapentin, and sliding scale insulin  -glucose POCT for meals and as warranted     #FEN//GI  -voiding freely  -on Miralax  -diabetic regular diet  -zofran ordered     #DVT ppx  -LVX 40 mg BID (Crcl 72, BMI 41)  -SCD to right leg     Dispo: medically clear for discharge. Rec'd SNF by PT/OT. Patient now amendable to SNF and live-in boyfriend agrees. Patient received list today.     Pt seen and discussed with  attending, Dr. Subha Demarco MD  General Surgery PGY1  Trauma Surgery Service    ==============================================================================  CHIEF COMPLAINT / OVERNIGHT EVENTS:   No acute overnight events. Patient reports pain is well controlled. Working with physical therapy today. Tolerating diet. No other acute patient concerns.    MEDICAL HISTORY / ROS:  Admission history and ROS reviewed. Pertinent changes as follows:  none    PHYSICAL EXAM:  Heart Rate:  [78-99]   Temp:  [35.6 °C (96.1 °F)-37.2 °C (99 °F)]   Resp:  [15-20]   BP: (106-159)/(65-85)   SpO2:  [94 %-96 %]   Physical Exam  Constitutional:       Appearance: Normal appearance.   HENT:      Head: Normocephalic and atraumatic.      Right Ear: External ear normal.      Left Ear: External ear normal.      Nose: Nose normal.      Mouth/Throat:      Mouth: Mucous membranes are moist.   Eyes:      Pupils: Pupils are equal, round, and reactive to light.   Cardiovascular:      Rate and Rhythm: Normal rate and regular rhythm.      Pulses: Normal pulses.      Heart sounds: Normal heart sounds.   Pulmonary:      Effort: Pulmonary effort is normal.      Breath sounds: Normal breath sounds.   Abdominal:      General: Bowel sounds are normal. There is no distension.      Palpations: Abdomen is soft.      Tenderness: There is no abdominal tenderness.   Musculoskeletal:      Cervical back: Normal range of motion and neck supple.      Comments: TTP of left femur, LLE wrapped, NVI   Skin:     General: Skin is warm and dry.      Capillary Refill: Capillary refill takes less than 2 seconds.   Neurological:      Mental Status: She is alert and oriented to person, place, and time.   Psychiatric:         Mood and Affect: Mood normal.         Behavior: Behavior normal.         IMAGING SUMMARY:  (summary of new imaging findings, not a copy of dictation)  No new imaging    LABS:  Results from last 7 days   Lab Units 04/19/24  0547 04/17/24  0901  04/16/24  0637 04/15/24  2128   WBC AUTO x10*3/uL 10.1 8.7 9.2 9.6   HEMOGLOBIN g/dL 7.0* 7.1* 8.7* 10.6*   HEMATOCRIT % 22.4* 21.5* 27.2* 31.6*   PLATELETS AUTO x10*3/uL 243 217 256 318   NEUTROS PCT AUTO % 65.3  --   --  75.7   LYMPHS PCT AUTO % 23.2  --   --  18.3   MONOS PCT AUTO % 9.4  --   --  4.5   EOS PCT AUTO % 1.3  --   --  0.6     Results from last 7 days   Lab Units 04/15/24  2128   APTT seconds 31   INR  0.9     Results from last 7 days   Lab Units 04/19/24  0636 04/17/24  0901 04/15/24  2128   SODIUM mmol/L 135* 134* 136   POTASSIUM mmol/L 4.9 4.5 4.2   CHLORIDE mmol/L 103 102 103   CO2 mmol/L 22 23 26   BUN mg/dL 37* 31* 30*   CREATININE mg/dL 2.20* 2.03* 1.36*   CALCIUM mg/dL 8.2* 8.4* 8.9   PROTEIN TOTAL g/dL  --   --  6.9   BILIRUBIN TOTAL mg/dL  --   --  0.4   ALK PHOS U/L  --   --  120*   ALT U/L  --   --  23   AST U/L  --   --  22   GLUCOSE mg/dL 189* 216* 399*     Results from last 7 days   Lab Units 04/15/24  2128   BILIRUBIN TOTAL mg/dL 0.4           I have reviewed all medications, laboratory results, and imaging pertinent for today's encounter.

## 2024-04-22 LAB
GLUCOSE BLD MANUAL STRIP-MCNC: 149 MG/DL (ref 74–99)
GLUCOSE BLD MANUAL STRIP-MCNC: 180 MG/DL (ref 74–99)
GLUCOSE BLD MANUAL STRIP-MCNC: 214 MG/DL (ref 74–99)
GLUCOSE BLD MANUAL STRIP-MCNC: 240 MG/DL (ref 74–99)
GLUCOSE BLD MANUAL STRIP-MCNC: 286 MG/DL (ref 74–99)

## 2024-04-22 PROCEDURE — 82947 ASSAY GLUCOSE BLOOD QUANT: CPT

## 2024-04-22 PROCEDURE — 2500000002 HC RX 250 W HCPCS SELF ADMINISTERED DRUGS (ALT 637 FOR MEDICARE OP, ALT 636 FOR OP/ED)

## 2024-04-22 PROCEDURE — 2500000001 HC RX 250 WO HCPCS SELF ADMINISTERED DRUGS (ALT 637 FOR MEDICARE OP): Performed by: PHYSICIAN ASSISTANT

## 2024-04-22 PROCEDURE — 2500000001 HC RX 250 WO HCPCS SELF ADMINISTERED DRUGS (ALT 637 FOR MEDICARE OP)

## 2024-04-22 PROCEDURE — 99232 SBSQ HOSP IP/OBS MODERATE 35: CPT | Performed by: PHYSICIAN ASSISTANT

## 2024-04-22 PROCEDURE — 97530 THERAPEUTIC ACTIVITIES: CPT | Mod: GP

## 2024-04-22 PROCEDURE — 1100000001 HC PRIVATE ROOM DAILY

## 2024-04-22 PROCEDURE — 2500000004 HC RX 250 GENERAL PHARMACY W/ HCPCS (ALT 636 FOR OP/ED)

## 2024-04-22 RX ORDER — ACETAMINOPHEN 325 MG/1
975 TABLET ORAL EVERY 8 HOURS SCHEDULED
Status: DISCONTINUED | OUTPATIENT
Start: 2024-04-22 | End: 2024-04-25 | Stop reason: HOSPADM

## 2024-04-22 RX ORDER — DOCUSATE SODIUM 100 MG/1
100 CAPSULE, LIQUID FILLED ORAL 2 TIMES DAILY
Status: DISCONTINUED | OUTPATIENT
Start: 2024-04-22 | End: 2024-04-25 | Stop reason: HOSPADM

## 2024-04-22 RX ORDER — OXYCODONE HYDROCHLORIDE 5 MG/1
5 TABLET ORAL EVERY 6 HOURS PRN
Status: DISCONTINUED | OUTPATIENT
Start: 2024-04-22 | End: 2024-04-23

## 2024-04-22 RX ORDER — SENNOSIDES 8.6 MG/1
1 TABLET ORAL 2 TIMES DAILY
Status: DISCONTINUED | OUTPATIENT
Start: 2024-04-22 | End: 2024-04-25 | Stop reason: HOSPADM

## 2024-04-22 RX ORDER — INSULIN GLARGINE 100 [IU]/ML
18 INJECTION, SOLUTION SUBCUTANEOUS NIGHTLY
Status: DISCONTINUED | OUTPATIENT
Start: 2024-04-22 | End: 2024-04-25 | Stop reason: HOSPADM

## 2024-04-22 RX ORDER — LACTULOSE 10 G/15ML
20 SOLUTION ORAL DAILY PRN
Status: DISCONTINUED | OUTPATIENT
Start: 2024-04-22 | End: 2024-04-23

## 2024-04-22 RX ADMIN — INSULIN LISPRO 10 UNITS: 100 INJECTION, SOLUTION INTRAVENOUS; SUBCUTANEOUS at 08:04

## 2024-04-22 RX ADMIN — INSULIN LISPRO 4 UNITS: 100 INJECTION, SOLUTION INTRAVENOUS; SUBCUTANEOUS at 17:25

## 2024-04-22 RX ADMIN — INSULIN GLARGINE 18 UNITS: 100 INJECTION, SOLUTION SUBCUTANEOUS at 21:48

## 2024-04-22 RX ADMIN — SENNOSIDES 8.6 MG: 8.6 TABLET, FILM COATED ORAL at 08:04

## 2024-04-22 RX ADMIN — GABAPENTIN 300 MG: 300 CAPSULE ORAL at 08:04

## 2024-04-22 RX ADMIN — DOCUSATE SODIUM 100 MG: 100 CAPSULE, LIQUID FILLED ORAL at 21:47

## 2024-04-22 RX ADMIN — FOLIC ACID 1 MG: 1 TABLET ORAL at 08:04

## 2024-04-22 RX ADMIN — INSULIN LISPRO 10 UNITS: 100 INJECTION, SOLUTION INTRAVENOUS; SUBCUTANEOUS at 17:25

## 2024-04-22 RX ADMIN — OXYCODONE HYDROCHLORIDE 5 MG: 5 TABLET ORAL at 08:05

## 2024-04-22 RX ADMIN — INSULIN LISPRO 6 UNITS: 100 INJECTION, SOLUTION INTRAVENOUS; SUBCUTANEOUS at 06:29

## 2024-04-22 RX ADMIN — ENOXAPARIN SODIUM 40 MG: 100 INJECTION SUBCUTANEOUS at 09:00

## 2024-04-22 RX ADMIN — ATORVASTATIN CALCIUM 40 MG: 40 TABLET, FILM COATED ORAL at 08:04

## 2024-04-22 RX ADMIN — INSULIN LISPRO 2 UNITS: 100 INJECTION, SOLUTION INTRAVENOUS; SUBCUTANEOUS at 21:48

## 2024-04-22 RX ADMIN — CLONIDINE HYDROCHLORIDE 0.1 MG: 0.1 TABLET ORAL at 00:14

## 2024-04-22 RX ADMIN — AMLODIPINE BESYLATE 10 MG: 10 TABLET ORAL at 08:04

## 2024-04-22 RX ADMIN — SENNOSIDES 8.6 MG: 8.6 TABLET, FILM COATED ORAL at 21:47

## 2024-04-22 RX ADMIN — ENOXAPARIN SODIUM 40 MG: 100 INJECTION SUBCUTANEOUS at 17:24

## 2024-04-22 RX ADMIN — ACETAMINOPHEN 975 MG: 325 TABLET ORAL at 06:34

## 2024-04-22 RX ADMIN — CLONIDINE HYDROCHLORIDE 0.1 MG: 0.1 TABLET ORAL at 21:47

## 2024-04-22 RX ADMIN — ASPIRIN 81 MG 81 MG: 81 TABLET ORAL at 08:04

## 2024-04-22 RX ADMIN — DOCUSATE SODIUM 100 MG: 100 CAPSULE, LIQUID FILLED ORAL at 08:04

## 2024-04-22 RX ADMIN — GABAPENTIN 300 MG: 300 CAPSULE ORAL at 21:47

## 2024-04-22 RX ADMIN — ACETAMINOPHEN 975 MG: 325 TABLET ORAL at 21:56

## 2024-04-22 RX ADMIN — Medication 2000 UNITS: at 06:34

## 2024-04-22 RX ADMIN — OXYCODONE HYDROCHLORIDE 5 MG: 5 TABLET ORAL at 17:24

## 2024-04-22 ASSESSMENT — PAIN - FUNCTIONAL ASSESSMENT
PAIN_FUNCTIONAL_ASSESSMENT: 0-10

## 2024-04-22 ASSESSMENT — PAIN SCALES - GENERAL
PAINLEVEL_OUTOF10: 7
PAINLEVEL_OUTOF10: 4
PAINLEVEL_OUTOF10: 7

## 2024-04-22 ASSESSMENT — COGNITIVE AND FUNCTIONAL STATUS - GENERAL
MOVING FROM LYING ON BACK TO SITTING ON SIDE OF FLAT BED WITH BEDRAILS: A LOT
WALKING IN HOSPITAL ROOM: TOTAL
CLIMB 3 TO 5 STEPS WITH RAILING: TOTAL
DRESSING REGULAR LOWER BODY CLOTHING: TOTAL
TURNING FROM BACK TO SIDE WHILE IN FLAT BAD: A LOT
CLIMB 3 TO 5 STEPS WITH RAILING: TOTAL
MOBILITY SCORE: 8
DAILY ACTIVITIY SCORE: 13
WALKING IN HOSPITAL ROOM: TOTAL
DRESSING REGULAR UPPER BODY CLOTHING: A LITTLE
MOVING FROM LYING ON BACK TO SITTING ON SIDE OF FLAT BED WITH BEDRAILS: A LOT
MOVING TO AND FROM BED TO CHAIR: TOTAL
MOVING TO AND FROM BED TO CHAIR: TOTAL
EATING MEALS: A LITTLE
HELP NEEDED FOR BATHING: A LOT
MOBILITY SCORE: 8
TOILETING: TOTAL
STANDING UP FROM CHAIR USING ARMS: TOTAL
STANDING UP FROM CHAIR USING ARMS: TOTAL
PERSONAL GROOMING: A LITTLE
TURNING FROM BACK TO SIDE WHILE IN FLAT BAD: A LOT

## 2024-04-22 ASSESSMENT — PAIN DESCRIPTION - ORIENTATION: ORIENTATION: LEFT

## 2024-04-22 ASSESSMENT — PAIN DESCRIPTION - LOCATION: LOCATION: LEG

## 2024-04-22 NOTE — PROGRESS NOTES
Physical Therapy    Physical Therapy Treatment    Patient Name: Nuris Squires  MRN: [id number]  Today's Date: 4/22/2024  Time Calculation  Start Time: 1148  Stop Time: 1213  Time Calculation (min): 25 min       Assessment/Plan   PT Assessment  Assessment Comment: Pt was able to perform STS with max A x 2 and WW. Pt with difficulty maintaining precautions standing despite max vc - requiring 3rd person to assist.  Remains limited by pain, dec strength, balance, & endurance.  Would benefit from continued skilled therapy.  End of Session Patient Position: Bed, 3 rail up, Alarm on  PT Plan  Inpatient/Swing Bed or Outpatient: Inpatient  PT Plan  Treatment/Interventions: Bed mobility, Transfer training, Gait training, Stair training, Balance training, Strengthening, Endurance training, Therapeutic exercise, Therapeutic activity, Home exercise program  PT Plan: Skilled PT  PT Frequency: 4 times per week  PT Discharge Recommendations: Moderate intensity level of continued care  PT Recommended Transfer Status: Assist x2  PT - OK to Discharge: Yes      General Visit Information:   PT  Visit  PT Received On: 04/22/24  Response to Previous Treatment: Patient with no complaints from previous session.  General  Reason for Referral: s/p L femur ORIF and rIMN  Past Medical History Relevant to Rehab: DM, HTN, obesity, charcot R foot sp ankle fusion  Family/Caregiver Present: Yes  Caregiver Feedback: boyfriend present and supportive  Prior to Session Communication: Bedside nurse  Patient Position Received: Bed, 3 rail up, Alarm on  General Comment: Pt pleasant and agreeable to therapy    Subjective   Precautions:  Precautions  LE Weight Bearing Status: Left Non-Weight Bearing  Medical Precautions: Fall precautions    Objective   Pain:  Pain Assessment  Pain Assessment: 0-10  Pain Score: 4  Pain Type: Acute pain  Pain Location: Leg  Pain Orientation: Left  Pain Interventions: Repositioned  Cognition:  Cognition  Overall Cognitive  Status: Within Functional Limits  Postural Control:  Postural Control  Postural Control: Within Functional Limits  Static Sitting Balance  Static Sitting-Balance Support: Bilateral upper extremity supported  Static Sitting-Level of Assistance: Close supervision  Static Standing Balance  Static Standing-Balance Support: Bilateral upper extremity supported  Static Standing-Level of Assistance: Moderate assistance (x2)        Activity Tolerance:  Activity Tolerance  Endurance: Tolerates 30 min exercise with multiple rests  Treatments:  Therapeutic Exercise  Therapeutic Exercise Performed: Yes  Therapeutic Exercise Activity 1: BLE APs x10, QS, GS       Bed Mobility 1  Bed Mobility 1: Supine to sitting  Level of Assistance 1: Moderate assistance, Moderate verbal cues  Bed Mobility 2  Bed Mobility  2: Sitting to supine  Level of Assistance 2: Moderate assistance  Bed Mobility Comments 2: BLEs    Ambulation/Gait Training  Ambulation/Gait Training Performed: No  Transfers  Transfer: Yes  Transfer 1  Technique 1: Sit to stand, Stand to sit  Transfer Device 1: Walker  Transfer Level of Assistance 1: Maximum verbal cues, Maximum tactile cues, Maximum assistance, +2  Trials/Comments 1: x4 trials total: 1st/2nd with maxAx2 with therapist assisting to maintain NWB LLE; 3rd/4th trials with 3rd person keeping LLE off floor/NWB + maxAx2 to stand: static stands x 30s-1min each trial, inc difficulty with maintaining NWB    Outcome Measures:  [facility] Basic Mobility  Turning from your back to your side while in a flat bed without using bedrails: A lot  Moving from lying on your back to sitting on the side of a flat bed without using bedrails: A lot  Moving to and from bed to chair (including a wheelchair): Total  Standing up from a chair using your arms (e.g. wheelchair or bedside chair): Total  To walk in hospital room: Total  Climbing 3-5 steps with railing: Total  Basic Mobility - Total Score: 8    Education Documentation  Precautions,  taught by Jing Reardon, PT at 4/22/2024  4:10 PM.  Learner: Patient  Readiness: Acceptance  Method: Explanation  Response: Needs Reinforcement    Body Mechanics, taught by Jing Reardon, PT at 4/22/2024  4:10 PM.  Learner: Patient  Readiness: Acceptance  Method: Explanation  Response: Needs Reinforcement    Mobility Training, taught by Jing Reardon, PT at 4/22/2024  4:10 PM.  Learner: Patient  Readiness: Acceptance  Method: Explanation  Response: Needs Reinforcement    Education Comments  No comments found.        OP EDUCATION:       Encounter Problems       Encounter Problems (Active)       PT Problem       Pt. will perform bed mobility with min A x 1  (Progressing)       Start:  04/18/24    Expected End:  05/02/24            Pt. will perform transfers min A x 1 with RW while maintaining NWB LLE  (Progressing)       Start:  04/18/24    Expected End:  05/02/24            Pt. will ambulate > 10 ft. with min A and RW for short household ambulation  (Progressing)       Start:  04/18/24    Expected End:  05/02/24            Pt. will ascend/descend 5 steps with mod A x 1 to safely enter/exit the home set up  (Progressing)       Start:  04/18/24    Expected End:  05/02/24 04/22/24 at 4:11 PM - Jing Reardon, PT

## 2024-04-22 NOTE — CARE PLAN
The patient's goals for the shift include rest    The clinical goals for the shift include pain control/rest    Over the shift, the patient did make progress toward the following goals. Barriers to progression include n/a.  Recommendations to address these barriers include n/a.

## 2024-04-22 NOTE — PROGRESS NOTES
SW followed up with patient at bedside for SNF FOC. Patient confirmed FOC is [facility]. Referral submitted for review. SW will continue to follow.    1653- Referral under review with [facility]. Pending acceptance. SW requested [facility] to initiate precert IF they are able to accept. SW will continue to follow.    DELORIS Valencia

## 2024-04-22 NOTE — PROGRESS NOTES
....[facility]  TRAUMA SERVICE - PROGRESS NOTE    Patient Name: Nuris Squires  MRN: [id number]  Admit Date: 415  : 1965  AGE: 58 y.o.   GENDER: female  ==============================================================================  MECHANISM OF INJURY:   57 y/o F s/p mGLF onto L knee.   LOC (yes/no?): No  Anticoagulant / Anti-platelet Rx? (for what dx?): No  Referring Facility Name (N/A for scene EMR run): N/A    Injuries/problems:  - L distal femur fx  -HLD, HTN, IDDm (A1C 11.9/295), CKD (creat 1.9-2.1), hypoxia (2L home as need)    INCIDENTAL FINDINGS:  None    PROCEDURES:  : L femur ORIF    ==============================================================================  TODAY'S ASSESSMENT AND PLAN OF CARE:    #Comminuted and moderately displaced intra-articular fracture of the L distal femur    -->Ortho sign off:        -NWB LLE, ROMAT  -Pain control: tylenol scheduled, prn oxy 2.5/5 q6 as needed, home miguel a per below  -PT/OT rec'd SNF     ##Comorbids   -Cont home amlodipine, aspirin, atorvastatin, clonidine, gabapentin, folic acid and sliding scale insulin humalog #2.   -glucose POCT with meals, increase home 12 units lantus to 18 units bedtime (req 6 units/24 hrs), cont home sched 10 units lispro with meals  - home 2L O2 as needed  - Needs PCP follow up     #FEN//GI  -voiding freely  -on Miralax, add colace/senna bid and lactulose as needed  -diabetic diet     #DVT ppx  -LVX 40 mg BID (Crcl 72, BMI 41)  -SCDs      Dispo: medically clear for discharge to SNF     Discussed with attending, NIMO PiersonC  Trauma Surgery Service  [id number]    ==============================================================================  CHIEF COMPLAINT / OVERNIGHT EVENTS:   No acute overnight events. Pain controlled. Yet to have BM this admission    MEDICAL HISTORY / ROS:  Admission history and ROS reviewed. Pertinent changes as follows:  none    PHYSICAL  EXAM:  Heart Rate:  [75-93]   Temp:  [36.2 °C (97.2 °F)-37.6 °C (99.7 °F)]   Resp:  [14-18]   BP: (113-146)/(70-83)   SpO2:  [90 %-98 %]   Physical Exam  Constitutional:       Appearance: Normal appearance.   HENT:      Head: Normocephalic and atraumatic.      Right Ear: External ear normal.      Left Ear: External ear normal.      Nose: Nose normal.      Mouth/Throat:      Mouth: Mucous membranes are moist.   Eyes:      Pupils: Pupils are equal, round, and reactive to light.   Cardiovascular:      Rate and Rhythm: Normal rate and regular rhythm.      Pulses: Normal pulses.      Heart sounds: Normal heart sounds.   Pulmonary:      Effort: Pulmonary effort is normal.      Breath sounds: Normal breath sounds.      Comments: 2L NC  Abdominal:      General: Bowel sounds are normal. There is no distension.      Palpations: Abdomen is soft.      Tenderness: There is no abdominal tenderness.      Comments: obese   Musculoskeletal:      Cervical back: Normal range of motion and neck supple.      Comments: TTP of left femur, LLE wrapped, NVI   Skin:     General: Skin is warm and dry.      Capillary Refill: Capillary refill takes less than 2 seconds.   Neurological:      Mental Status: She is alert and oriented to person, place, and time.   Psychiatric:         Mood and Affect: Mood normal.         Behavior: Behavior normal.         IMAGING SUMMARY:  (summary of new imaging findings, not a copy of dictation)  No new imaging    LABS:  Results from last 7 days   Lab Units 04/19/24  0547 04/17/24  0901 04/16/24  0637 04/15/24  2128   WBC AUTO x10*3/uL 10.1 8.7 9.2 9.6   HEMOGLOBIN g/dL 7.0* 7.1* 8.7* 10.6*   HEMATOCRIT % 22.4* 21.5* 27.2* 31.6*   PLATELETS AUTO x10*3/uL 243 217 256 318   NEUTROS PCT AUTO % 65.3  --   --  75.7   LYMPHS PCT AUTO % 23.2  --   --  18.3   MONOS PCT AUTO % 9.4  --   --  4.5   EOS PCT AUTO % 1.3  --   --  0.6     Results from last 7 days   Lab Units 04/15/24  2128   APTT seconds 31   INR  0.9      Results from last 7 days   Lab Units 04/19/24  0636 04/17/24  0901 04/15/24  2128   SODIUM mmol/L 135* 134* 136   POTASSIUM mmol/L 4.9 4.5 4.2   CHLORIDE mmol/L 103 102 103   CO2 mmol/L 22 23 26   BUN mg/dL 37* 31* 30*   CREATININE mg/dL 2.20* 2.03* 1.36*   CALCIUM mg/dL 8.2* 8.4* 8.9   PROTEIN TOTAL g/dL  --   --  6.9   BILIRUBIN TOTAL mg/dL  --   --  0.4   ALK PHOS U/L  --   --  120*   ALT U/L  --   --  23   AST U/L  --   --  22   GLUCOSE mg/dL 189* 216* 399*     Results from last 7 days   Lab Units 04/15/24  2128   BILIRUBIN TOTAL mg/dL 0.4           I have reviewed all medications, laboratory results, and imaging pertinent for today's encounter.

## 2024-04-23 LAB
ALBUMIN SERPL BCP-MCNC: 2.7 G/DL (ref 3.4–5)
ANION GAP SERPL CALC-SCNC: 15 MMOL/L (ref 10–20)
BUN SERPL-MCNC: 36 MG/DL (ref 6–23)
CALCIUM SERPL-MCNC: 8.6 MG/DL (ref 8.6–10.6)
CHLORIDE SERPL-SCNC: 105 MMOL/L (ref 98–107)
CO2 SERPL-SCNC: 23 MMOL/L (ref 21–32)
CREAT SERPL-MCNC: 1.64 MG/DL (ref 0.5–1.05)
EGFRCR SERPLBLD CKD-EPI 2021: 36 ML/MIN/1.73M*2
ERYTHROCYTE [DISTWIDTH] IN BLOOD BY AUTOMATED COUNT: 12.9 % (ref 11.5–14.5)
GLUCOSE BLD MANUAL STRIP-MCNC: 120 MG/DL (ref 74–99)
GLUCOSE BLD MANUAL STRIP-MCNC: 138 MG/DL (ref 74–99)
GLUCOSE BLD MANUAL STRIP-MCNC: 179 MG/DL (ref 74–99)
GLUCOSE BLD MANUAL STRIP-MCNC: 179 MG/DL (ref 74–99)
GLUCOSE BLD MANUAL STRIP-MCNC: 191 MG/DL (ref 74–99)
GLUCOSE BLD MANUAL STRIP-MCNC: 206 MG/DL (ref 74–99)
GLUCOSE BLD MANUAL STRIP-MCNC: 236 MG/DL (ref 74–99)
GLUCOSE SERPL-MCNC: 223 MG/DL (ref 74–99)
HCT VFR BLD AUTO: 19.4 % (ref 36–46)
HGB BLD-MCNC: 6.5 G/DL (ref 12–16)
MAGNESIUM SERPL-MCNC: 2.55 MG/DL (ref 1.6–2.4)
MCH RBC QN AUTO: 29.3 PG (ref 26–34)
MCHC RBC AUTO-ENTMCNC: 33.5 G/DL (ref 32–36)
MCV RBC AUTO: 87 FL (ref 80–100)
NRBC BLD-RTO: 0 /100 WBCS (ref 0–0)
PHOSPHATE SERPL-MCNC: 4.8 MG/DL (ref 2.5–4.9)
PLATELET # BLD AUTO: 418 X10*3/UL (ref 150–450)
POTASSIUM SERPL-SCNC: 5 MMOL/L (ref 3.5–5.3)
RBC # BLD AUTO: 2.22 X10*6/UL (ref 4–5.2)
SODIUM SERPL-SCNC: 138 MMOL/L (ref 136–145)
WBC # BLD AUTO: 8.8 X10*3/UL (ref 4.4–11.3)

## 2024-04-23 PROCEDURE — 2500000001 HC RX 250 WO HCPCS SELF ADMINISTERED DRUGS (ALT 637 FOR MEDICARE OP)

## 2024-04-23 PROCEDURE — 2500000002 HC RX 250 W HCPCS SELF ADMINISTERED DRUGS (ALT 637 FOR MEDICARE OP, ALT 636 FOR OP/ED)

## 2024-04-23 PROCEDURE — 1100000001 HC PRIVATE ROOM DAILY

## 2024-04-23 PROCEDURE — 97530 THERAPEUTIC ACTIVITIES: CPT | Mod: GP

## 2024-04-23 PROCEDURE — 2500000001 HC RX 250 WO HCPCS SELF ADMINISTERED DRUGS (ALT 637 FOR MEDICARE OP): Performed by: PHYSICIAN ASSISTANT

## 2024-04-23 PROCEDURE — 82947 ASSAY GLUCOSE BLOOD QUANT: CPT | Mod: MUE

## 2024-04-23 PROCEDURE — 83735 ASSAY OF MAGNESIUM: CPT

## 2024-04-23 PROCEDURE — 2500000004 HC RX 250 GENERAL PHARMACY W/ HCPCS (ALT 636 FOR OP/ED)

## 2024-04-23 PROCEDURE — 85027 COMPLETE CBC AUTOMATED: CPT

## 2024-04-23 PROCEDURE — 99231 SBSQ HOSP IP/OBS SF/LOW 25: CPT | Performed by: PHYSICIAN ASSISTANT

## 2024-04-23 PROCEDURE — 80069 RENAL FUNCTION PANEL: CPT

## 2024-04-23 PROCEDURE — 36415 COLL VENOUS BLD VENIPUNCTURE: CPT

## 2024-04-23 PROCEDURE — 97535 SELF CARE MNGMENT TRAINING: CPT | Mod: GO

## 2024-04-23 RX ORDER — POLYETHYLENE GLYCOL 3350 17 G/17G
17 POWDER, FOR SOLUTION ORAL EVERY 12 HOURS
Status: DISCONTINUED | OUTPATIENT
Start: 2024-04-23 | End: 2024-04-23

## 2024-04-23 RX ORDER — LACTULOSE 10 G/15ML
20 SOLUTION ORAL DAILY
Status: DISCONTINUED | OUTPATIENT
Start: 2024-04-23 | End: 2024-04-25 | Stop reason: HOSPADM

## 2024-04-23 RX ORDER — OXYCODONE HYDROCHLORIDE 5 MG/1
2.5 TABLET ORAL EVERY 6 HOURS PRN
Status: DISCONTINUED | OUTPATIENT
Start: 2024-04-23 | End: 2024-04-25 | Stop reason: HOSPADM

## 2024-04-23 RX ORDER — POLYETHYLENE GLYCOL 3350 17 G/17G
17 POWDER, FOR SOLUTION ORAL DAILY
Status: DISCONTINUED | OUTPATIENT
Start: 2024-04-23 | End: 2024-04-25 | Stop reason: HOSPADM

## 2024-04-23 RX ADMIN — FOLIC ACID 1 MG: 1 TABLET ORAL at 08:50

## 2024-04-23 RX ADMIN — ACETAMINOPHEN 975 MG: 325 TABLET ORAL at 17:41

## 2024-04-23 RX ADMIN — ENOXAPARIN SODIUM 40 MG: 100 INJECTION SUBCUTANEOUS at 06:18

## 2024-04-23 RX ADMIN — ATORVASTATIN CALCIUM 40 MG: 40 TABLET, FILM COATED ORAL at 08:50

## 2024-04-23 RX ADMIN — DOCUSATE SODIUM 100 MG: 100 CAPSULE, LIQUID FILLED ORAL at 08:50

## 2024-04-23 RX ADMIN — INSULIN LISPRO 2 UNITS: 100 INJECTION, SOLUTION INTRAVENOUS; SUBCUTANEOUS at 21:08

## 2024-04-23 RX ADMIN — INSULIN GLARGINE 18 UNITS: 100 INJECTION, SOLUTION SUBCUTANEOUS at 21:08

## 2024-04-23 RX ADMIN — LACTULOSE 20 G: 20 SOLUTION ORAL at 08:49

## 2024-04-23 RX ADMIN — OXYCODONE HYDROCHLORIDE 5 MG: 5 TABLET ORAL at 06:18

## 2024-04-23 RX ADMIN — SENNOSIDES 8.6 MG: 8.6 TABLET, FILM COATED ORAL at 08:50

## 2024-04-23 RX ADMIN — INSULIN LISPRO 4 UNITS: 100 INJECTION, SOLUTION INTRAVENOUS; SUBCUTANEOUS at 17:41

## 2024-04-23 RX ADMIN — ASPIRIN 81 MG 81 MG: 81 TABLET ORAL at 08:50

## 2024-04-23 RX ADMIN — OXYCODONE HYDROCHLORIDE 5 MG: 5 TABLET ORAL at 00:13

## 2024-04-23 RX ADMIN — ENOXAPARIN SODIUM 40 MG: 100 INJECTION SUBCUTANEOUS at 17:41

## 2024-04-23 RX ADMIN — ACETAMINOPHEN 975 MG: 325 TABLET ORAL at 06:18

## 2024-04-23 RX ADMIN — Medication 2000 UNITS: at 06:18

## 2024-04-23 RX ADMIN — CLONIDINE HYDROCHLORIDE 0.1 MG: 0.1 TABLET ORAL at 22:33

## 2024-04-23 RX ADMIN — GABAPENTIN 300 MG: 300 CAPSULE ORAL at 08:50

## 2024-04-23 RX ADMIN — DOCUSATE SODIUM 100 MG: 100 CAPSULE, LIQUID FILLED ORAL at 21:10

## 2024-04-23 RX ADMIN — GABAPENTIN 300 MG: 300 CAPSULE ORAL at 21:10

## 2024-04-23 RX ADMIN — INSULIN LISPRO 10 UNITS: 100 INJECTION, SOLUTION INTRAVENOUS; SUBCUTANEOUS at 08:50

## 2024-04-23 RX ADMIN — SENNOSIDES 8.6 MG: 8.6 TABLET, FILM COATED ORAL at 21:10

## 2024-04-23 RX ADMIN — INSULIN LISPRO 10 UNITS: 100 INJECTION, SOLUTION INTRAVENOUS; SUBCUTANEOUS at 17:41

## 2024-04-23 RX ADMIN — AMLODIPINE BESYLATE 10 MG: 10 TABLET ORAL at 08:50

## 2024-04-23 RX ADMIN — INSULIN LISPRO 4 UNITS: 100 INJECTION, SOLUTION INTRAVENOUS; SUBCUTANEOUS at 06:22

## 2024-04-23 ASSESSMENT — COGNITIVE AND FUNCTIONAL STATUS - GENERAL
HELP NEEDED FOR BATHING: A LOT
DAILY ACTIVITIY SCORE: 14
TURNING FROM BACK TO SIDE WHILE IN FLAT BAD: A LOT
CLIMB 3 TO 5 STEPS WITH RAILING: TOTAL
WALKING IN HOSPITAL ROOM: TOTAL
MOVING FROM LYING ON BACK TO SITTING ON SIDE OF FLAT BED WITH BEDRAILS: A LOT
EATING MEALS: A LITTLE
PERSONAL GROOMING: A LITTLE
MOVING TO AND FROM BED TO CHAIR: TOTAL
TOILETING: TOTAL
MOBILITY SCORE: 8
DRESSING REGULAR LOWER BODY CLOTHING: A LOT
STANDING UP FROM CHAIR USING ARMS: TOTAL
DRESSING REGULAR UPPER BODY CLOTHING: A LITTLE

## 2024-04-23 ASSESSMENT — PAIN - FUNCTIONAL ASSESSMENT
PAIN_FUNCTIONAL_ASSESSMENT: 0-10

## 2024-04-23 ASSESSMENT — PAIN SCALES - GENERAL
PAINLEVEL_OUTOF10: 2
PAINLEVEL_OUTOF10: 3
PAINLEVEL_OUTOF10: 7
PAINLEVEL_OUTOF10: 4
PAINLEVEL_OUTOF10: 8

## 2024-04-23 ASSESSMENT — PAIN DESCRIPTION - LOCATION
LOCATION: LEG
LOCATION: LEG

## 2024-04-23 ASSESSMENT — ACTIVITIES OF DAILY LIVING (ADL): HOME_MANAGEMENT_TIME_ENTRY: 15

## 2024-04-23 ASSESSMENT — PAIN DESCRIPTION - DESCRIPTORS: DESCRIPTORS: DISCOMFORT

## 2024-04-23 ASSESSMENT — PAIN DESCRIPTION - ORIENTATION
ORIENTATION: LEFT
ORIENTATION: LEFT

## 2024-04-23 NOTE — PROGRESS NOTES
Physical Therapy    Physical Therapy Treatment    Patient Name: Nuris Squires  MRN: [id number]  Today's Date: 4/23/2024  Time Calculation  Start Time: 1327  Stop Time: 1343  Time Calculation (min): 16 min       Assessment/Plan   PT Assessment  End of Session Communication: Bedside nurse  Assessment Comment: Pt was able to perform STS with max A x 2-3 and WW. Pt with difficulty maintaining precautions standing despite max vc - requiring 3rd person to assist.  Remains limited by pain, dec strength, balance, & endurance.  Would benefit from continued skilled therapy.  End of Session Patient Position: Bed, 3 rail up, Alarm off, not on at start of session  PT Plan  Inpatient/Swing Bed or Outpatient: Inpatient  PT Plan  Treatment/Interventions: Bed mobility, Transfer training, Gait training, Stair training, Balance training, Strengthening, Endurance training, Therapeutic exercise, Therapeutic activity, Home exercise program  PT Plan: Skilled PT  PT Frequency: 4 times per week  PT Discharge Recommendations: Moderate intensity level of continued care  PT Recommended Transfer Status: Assist x2  PT - OK to Discharge: Yes      General Visit Information:   PT  Visit  PT Received On: 04/23/24  Response to Previous Treatment: Patient reporting fatigue but able to participate.  General  Reason for Referral: s/p L femur ORIF and rIMN  Family/Caregiver Present: Yes  Caregiver Feedback: boyfriend present and supportive  Co-Treatment: OT  Co-Treatment Reason: Pt seen with OT to maximize pt safety & to maximize therapeutic potential  Prior to Session Communication: Bedside nurse  Patient Position Received: Bed, 3 rail up, Alarm off, not on at start of session  General Comment: Pt very drowsy, but agreeable to therapy session    Subjective   Precautions:  Precautions  LE Weight Bearing Status: Left Non-Weight Bearing  Medical Precautions: Fall precautions    Objective   Pain:  Pain Assessment  Pain Assessment: 0-10  Pain Score: 3  Pain  Type: Surgical pain  Pain Location: Leg  Pain Orientation: Left  Pain Interventions: Elevated  Cognition:  Cognition  Arousal/Alertness:  (Drowsy with delayed responses)  Postural Control:  Postural Control  Postural Control: Within Functional Limits  Static Sitting Balance  Static Sitting-Balance Support: Bilateral upper extremity supported  Static Sitting-Level of Assistance: Contact guard  Static Standing Balance  Static Standing-Balance Support: Bilateral upper extremity supported  Static Standing-Level of Assistance: Maximum assistance (x2-3)    Treatments:  Bed Mobility  Bed Mobility: Yes  Bed Mobility 1  Bed Mobility 1: Supine to sitting  Level of Assistance 1: Moderate assistance, Moderate verbal cues  Bed Mobility Comments 1: HOB elevated  Bed Mobility 2  Bed Mobility  2: Sitting to supine  Level of Assistance 2: Moderate assistance  Bed Mobility Comments 2: for LEs    Ambulation/Gait Training  Ambulation/Gait Training Performed: No  Transfers  Transfer: Yes  Transfer 1  Technique 1: Sit to stand, Stand to sit  Transfer Device 1: Walker  Transfer Level of Assistance 1: Maximum verbal cues, Maximum tactile cues, Maximum assistance, +2 (+1 to maintain LLE NWB)  Trials/Comments 1: x1    Outcome Measures:  [facility] Basic Mobility  Turning from your back to your side while in a flat bed without using bedrails: A lot  Moving from lying on your back to sitting on the side of a flat bed without using bedrails: A lot  Moving to and from bed to chair (including a wheelchair): Total  Standing up from a chair using your arms (e.g. wheelchair or bedside chair): Total  To walk in hospital room: Total  Climbing 3-5 steps with railing: Total  Basic Mobility - Total Score: 8    Education Documentation  Precautions, taught by Jing Reardon, PT at 4/23/2024  2:20 PM.  Learner: Patient  Readiness: Acceptance  Method: Explanation  Response: Needs Reinforcement    Body Mechanics, taught by Jing Reardon, PT at 4/23/2024   2:20 PM.  Learner: Patient  Readiness: Acceptance  Method: Explanation  Response: Needs Reinforcement    Mobility Training, taught by Jing Reardon, PT at 4/23/2024  2:20 PM.  Learner: Patient  Readiness: Acceptance  Method: Explanation  Response: Needs Reinforcement    Education Comments  No comments found.        OP EDUCATION:       Encounter Problems       Encounter Problems (Active)       PT Problem       Pt. will perform bed mobility with min A x 1  (Progressing)       Start:  04/18/24    Expected End:  05/02/24            Pt. will perform transfers min A x 1 with RW while maintaining NWB LLE  (Progressing)       Start:  04/18/24    Expected End:  05/02/24            Pt. will ambulate > 10 ft. with min A and RW for short household ambulation  (Progressing)       Start:  04/18/24    Expected End:  05/02/24            Pt. will ascend/descend 5 steps with mod A x 1 to safely enter/exit the home set up  (Progressing)       Start:  04/18/24    Expected End:  05/02/24 04/23/24 at 2:21 PM - Jing Reardon, PT

## 2024-04-23 NOTE — PROGRESS NOTES
Occupational Therapy    Occupational Therapy Treatment    Name: Nuris Squires  MRN: [id number]  : 1965  Date: 24  Time Calculation  Start Time: 1327  Stop Time: 1342  Time Calculation (min): 15 min    Assessment:  Medical Staff Made Aware: Yes  End of Session Communication: Bedside nurse  End of Session Patient Position: Bed, 3 rail up, Alarm off, not on at start of session  Plan:  Treatment Interventions: ADL retraining, Functional transfer training  OT Frequency: 4 times per week  OT Discharge Recommendations: Moderate intensity level of continued care  OT Recommended Transfer Status: Dependent, Assist of 2  OT - OK to Discharge: Yes    Subjective   General:  OT Last Visit  OT Received On: 24  Reason for Referral: s/p L femur ORIF and rIMN  Co-Treatment: PT  Co-Treatment Reason: To increase pt's participation and safety.  Prior to Session Communication: Bedside nurse  Patient Position Received: Bed, 3 rail up, Alarm off, not on at start of session  Family/Caregiver Present: Yes  Caregiver Feedback: boyfriend present  General Comment: very drowsy at the start of the session.       Cognition:  Arousal/Alertness:  (appeared drowsy)    Pain Assessment:  Pain Assessment  Pain Assessment: 0-10  Pain Score: 3  Pain Type: Surgical pain  Pain Location: Leg  Pain Orientation: Left     Objective   Activities of Daily Living:      Grooming  Grooming Level of Assistance: Setup  Grooming Where Assessed: Edge of bed  Grooming Comments: facial hygiene    UE Bathing  UE Bathing Level of Assistance: Setup  UE Bathing Where Assessed: Edge of bed    UE Dressing  UE Dressing Level of Assistance: Minimum assistance, Minimal verbal cues  UE Dressing Where Assessed: Edge of bed    LE Dressing  LE Dressing: Yes  Sock Level of Assistance: Maximum assistance, Minimal verbal cues  LE Dressing Where Assessed: Edge of bed       Bed Mobility/Transfers:   Bed Mobility  Bed Mobility: Yes  Bed Mobility 1  Bed Mobility 1: Supine  to sitting  Level of Assistance 1: Moderate assistance, Moderate verbal cues  Bed Mobility Comments 1: HOB elevated  Bed Mobility 2  Bed Mobility  2: Sitting to supine  Level of Assistance 2: Moderate assistance    Transfers  Transfer: Yes  Transfer 1  Transfer From 1: Bed to  Transfer to 1: Stand  Technique 1: Sit to stand, Stand to sit  Transfer Device 1: Walker  Transfer Level of Assistance 1: Maximum assistance, Maximum verbal cues, +2  Trials/Comments 1: required a 3rd person to assist with maintaining NWB LLE.    Balance:  Dynamic Sitting Balance  Dynamic Sitting-Balance Support: Bilateral upper extremity supported  Dynamic Sitting-Balance:  (dependent x 2 to scoot towards the side of the bd. Pt presented difficulties scooting back in bed, and required max assist x 2 to return back to bed.)  Dynamic Sitting-Comments: increased time provided to complete bed mobility and to attempt standing from an elevated surface.  Static Sitting Balance  Static Sitting-Balance Support: Bilateral upper extremity supported  Static Sitting-Level of Assistance: Contact guard  Static Sitting-Comment/Number of Minutes: forward lean while completing sponge bathing, able to correct posture to midline with CGA and min v.c.    Outcome Measures:  [facility] Daily Activity  Putting on and taking off regular lower body clothing: A lot  Bathing (including washing, rinsing, drying): A lot  Putting on and taking off regular upper body clothing: A little  Toileting, which includes using toilet, bedpan or urinal: Total  Taking care of personal grooming such as brushing teeth: A little  Eating Meals: A little  Daily Activity - Total Score: 14     Education Documentation  Body Mechanics, taught by Pablito Holley OT at 4/23/2024  3:27 PM.  Learner: Patient  Readiness: Acceptance  Method: Explanation  Response: Verbalizes Understanding    Precautions, taught by Pablito Holley OT at 4/23/2024  3:27 PM.  Learner: Patient  Readiness: Acceptance  Method:  Explanation  Response: Verbalizes Understanding    Home Exercise Program, taught by Pablito Holley OT at 4/23/2024  3:27 PM.  Learner: Patient  Readiness: Acceptance  Method: Explanation  Response: Verbalizes Understanding    ADL Training, taught by Pablito Holley OT at 4/23/2024  3:27 PM.  Learner: Patient  Readiness: Acceptance  Method: Explanation  Response: Verbalizes Understanding    Education Comments  No comments found.        Goals:  Encounter Problems       Encounter Problems (Active)       ADLs       Patient will perform UB and LB bathing with moderate assist level of assistance and grab bars. (Progressing)       Start:  04/19/24    Expected End:  05/10/24            Patient with complete lower body dressing with moderate assist level of assistance donning and doffing all LE clothes  with PRN adaptive equipment while edge of bed  (Progressing)       Start:  04/19/24    Expected End:  05/10/24            Patient will complete toileting including hygiene clothing management/hygiene with moderate assist level of assistance and grab bars. (Progressing)       Start:  04/19/24    Expected End:  05/10/24               BALANCE       Pt will maintain dynamic standing balance during ADL task with moderate assist level of assistance in order to demonstrate decreased risk of falling and improved postural control. (Progressing)       Start:  04/19/24    Expected End:  05/10/24               EXERCISE/STRENGTHENING       Patient will complete BUE exercises for 15 reps in order to improve strength and activity for ADL performance.  (Progressing)       Start:  04/19/24    Expected End:  05/10/24               MOBILITY       Patient will perform Functional mobility min Household distances/Community Distances with moderate assist level of assistance and least restrictive device in order to improve safety and functional mobility. (Progressing)       Start:  04/19/24    Expected End:  05/10/24               TRANSFERS        Patient will perform bed mobility moderate assist level of assistance and bed rails in order to improve safety and independence with mobility (Progressing)       Start:  04/19/24    Expected End:  05/10/24            Patient will complete sit to stand transfer with moderate assist level of assistance and least restrictive device in order to improve safety and prepare for out of bed mobility. (Progressing)       Start:  04/19/24    Expected End:  05/10/24                     04/23/24 at 3:28 PM   Pablito Holley OTR/L, OTD  [phone]

## 2024-04-23 NOTE — CARE PLAN
The patient's goals for the shift include Rest and Pain Control    The clinical goals for the shift include - Patient will remain safe and pain controlled this shift.      Problem: Pain  Goal: My pain/discomfort is manageable  Outcome: Progressing     Problem: Safety  Goal: Patient will be injury free during hospitalization  Outcome: Progressing  Goal: I will remain free of falls  Outcome: Progressing     Problem: Daily Care  Goal: Daily care needs are met  Outcome: Progressing     Problem: Psychosocial Needs  Goal: Demonstrates ability to cope with hospitalization/illness  Outcome: Progressing  Goal: Collaborate with me, my family, and caregiver to identify my specific goals  Outcome: Progressing     Problem: Discharge Barriers  Goal: My discharge needs are met  Outcome: Progressing     Problem: Skin  Goal: Decreased wound size/increased tissue granulation at next dressing change  Outcome: Progressing  Goal: Participates in plan/prevention/treatment measures  Outcome: Progressing  Goal: Prevent/manage excess moisture  Outcome: Progressing  Goal: Prevent/minimize sheer/friction injuries  Outcome: Progressing  Goal: Promote/optimize nutrition  Outcome: Progressing  Goal: Promote skin healing  Outcome: Progressing

## 2024-04-23 NOTE — PROGRESS NOTES
[facility] confirmed referral is under review. SW updated patient on placement developments. Patient confirmed she is agreeable to [facility] if they accept. SW will continue to follow.    1451- pt had a hypoglycemic episode when transport arrived. Dc cancelled. WINTER updated SNF.      DELORIS Valencia

## 2024-04-24 LAB
ALBUMIN SERPL BCP-MCNC: 2.6 G/DL (ref 3.4–5)
ANION GAP SERPL CALC-SCNC: 14 MMOL/L (ref 10–20)
BUN SERPL-MCNC: 39 MG/DL (ref 6–23)
CALCIUM SERPL-MCNC: 8.3 MG/DL (ref 8.6–10.6)
CHLORIDE SERPL-SCNC: 106 MMOL/L (ref 98–107)
CO2 SERPL-SCNC: 24 MMOL/L (ref 21–32)
CREAT SERPL-MCNC: 1.93 MG/DL (ref 0.5–1.05)
EGFRCR SERPLBLD CKD-EPI 2021: 30 ML/MIN/1.73M*2
ERYTHROCYTE [DISTWIDTH] IN BLOOD BY AUTOMATED COUNT: 13.1 % (ref 11.5–14.5)
GLUCOSE BLD MANUAL STRIP-MCNC: 126 MG/DL (ref 74–99)
GLUCOSE BLD MANUAL STRIP-MCNC: 136 MG/DL (ref 74–99)
GLUCOSE BLD MANUAL STRIP-MCNC: 276 MG/DL (ref 74–99)
GLUCOSE BLD MANUAL STRIP-MCNC: 293 MG/DL (ref 74–99)
GLUCOSE BLD MANUAL STRIP-MCNC: 57 MG/DL (ref 74–99)
GLUCOSE BLD MANUAL STRIP-MCNC: 64 MG/DL (ref 74–99)
GLUCOSE SERPL-MCNC: 139 MG/DL (ref 74–99)
HCT VFR BLD AUTO: 19.9 % (ref 36–46)
HGB BLD-MCNC: 6.2 G/DL (ref 12–16)
MCH RBC QN AUTO: 29.5 PG (ref 26–34)
MCHC RBC AUTO-ENTMCNC: 31.2 G/DL (ref 32–36)
MCV RBC AUTO: 95 FL (ref 80–100)
NRBC BLD-RTO: 0 /100 WBCS (ref 0–0)
PHOSPHATE SERPL-MCNC: 4.9 MG/DL (ref 2.5–4.9)
PLATELET # BLD AUTO: 469 X10*3/UL (ref 150–450)
POTASSIUM SERPL-SCNC: 5 MMOL/L (ref 3.5–5.3)
RBC # BLD AUTO: 2.1 X10*6/UL (ref 4–5.2)
SODIUM SERPL-SCNC: 139 MMOL/L (ref 136–145)
WBC # BLD AUTO: 9.7 X10*3/UL (ref 4.4–11.3)

## 2024-04-24 PROCEDURE — 84100 ASSAY OF PHOSPHORUS: CPT | Performed by: SURGERY

## 2024-04-24 PROCEDURE — 1100000001 HC PRIVATE ROOM DAILY

## 2024-04-24 PROCEDURE — 2500000005 HC RX 250 GENERAL PHARMACY W/O HCPCS

## 2024-04-24 PROCEDURE — 36415 COLL VENOUS BLD VENIPUNCTURE: CPT | Performed by: PHYSICIAN ASSISTANT

## 2024-04-24 PROCEDURE — 2500000001 HC RX 250 WO HCPCS SELF ADMINISTERED DRUGS (ALT 637 FOR MEDICARE OP)

## 2024-04-24 PROCEDURE — 2500000004 HC RX 250 GENERAL PHARMACY W/ HCPCS (ALT 636 FOR OP/ED): Performed by: PHYSICIAN ASSISTANT

## 2024-04-24 PROCEDURE — 2500000004 HC RX 250 GENERAL PHARMACY W/ HCPCS (ALT 636 FOR OP/ED)

## 2024-04-24 PROCEDURE — 2500000002 HC RX 250 W HCPCS SELF ADMINISTERED DRUGS (ALT 637 FOR MEDICARE OP, ALT 636 FOR OP/ED)

## 2024-04-24 PROCEDURE — 2500000001 HC RX 250 WO HCPCS SELF ADMINISTERED DRUGS (ALT 637 FOR MEDICARE OP): Performed by: PHYSICIAN ASSISTANT

## 2024-04-24 PROCEDURE — 82947 ASSAY GLUCOSE BLOOD QUANT: CPT | Mod: 91

## 2024-04-24 PROCEDURE — 80069 RENAL FUNCTION PANEL: CPT | Performed by: SURGERY

## 2024-04-24 PROCEDURE — 2500000004 HC RX 250 GENERAL PHARMACY W/ HCPCS (ALT 636 FOR OP/ED): Performed by: SURGERY

## 2024-04-24 PROCEDURE — 85027 COMPLETE CBC AUTOMATED: CPT | Performed by: PHYSICIAN ASSISTANT

## 2024-04-24 PROCEDURE — 99231 SBSQ HOSP IP/OBS SF/LOW 25: CPT | Performed by: PHYSICIAN ASSISTANT

## 2024-04-24 PROCEDURE — 97530 THERAPEUTIC ACTIVITIES: CPT | Mod: GP

## 2024-04-24 RX ORDER — ONDANSETRON 4 MG/1
4 TABLET, FILM COATED ORAL EVERY 8 HOURS PRN
Start: 2024-04-24 | End: 2024-06-04 | Stop reason: ALTCHOICE

## 2024-04-24 RX ORDER — INSULIN GLARGINE 100 [IU]/ML
18 INJECTION, SOLUTION SUBCUTANEOUS NIGHTLY
Start: 2024-04-24 | End: 2024-06-04 | Stop reason: DRUGHIGH

## 2024-04-24 RX ORDER — ENOXAPARIN SODIUM 100 MG/ML
30 INJECTION SUBCUTANEOUS 2 TIMES DAILY
Status: DISCONTINUED | OUTPATIENT
Start: 2024-04-24 | End: 2024-04-25 | Stop reason: HOSPADM

## 2024-04-24 RX ORDER — DOCUSATE SODIUM 100 MG/1
100 CAPSULE, LIQUID FILLED ORAL 2 TIMES DAILY PRN
Start: 2024-04-24 | End: 2024-05-24

## 2024-04-24 RX ORDER — LACTULOSE 10 G/15ML
20 SOLUTION ORAL DAILY PRN
Start: 2024-04-24 | End: 2024-06-04 | Stop reason: ALTCHOICE

## 2024-04-24 RX ADMIN — SENNOSIDES 8.6 MG: 8.6 TABLET, FILM COATED ORAL at 08:27

## 2024-04-24 RX ADMIN — CLONIDINE HYDROCHLORIDE 0.1 MG: 0.1 TABLET ORAL at 20:22

## 2024-04-24 RX ADMIN — ASPIRIN 81 MG 81 MG: 81 TABLET ORAL at 08:27

## 2024-04-24 RX ADMIN — ENOXAPARIN SODIUM 30 MG: 100 INJECTION SUBCUTANEOUS at 20:22

## 2024-04-24 RX ADMIN — FOLIC ACID 1 MG: 1 TABLET ORAL at 08:27

## 2024-04-24 RX ADMIN — LACTULOSE 20 G: 20 SOLUTION ORAL at 08:27

## 2024-04-24 RX ADMIN — INSULIN LISPRO 6 UNITS: 100 INJECTION, SOLUTION INTRAVENOUS; SUBCUTANEOUS at 08:22

## 2024-04-24 RX ADMIN — INSULIN GLARGINE 18 UNITS: 100 INJECTION, SOLUTION SUBCUTANEOUS at 20:53

## 2024-04-24 RX ADMIN — AMLODIPINE BESYLATE 10 MG: 10 TABLET ORAL at 08:27

## 2024-04-24 RX ADMIN — SENNOSIDES 8.6 MG: 8.6 TABLET, FILM COATED ORAL at 20:22

## 2024-04-24 RX ADMIN — GABAPENTIN 300 MG: 300 CAPSULE ORAL at 08:27

## 2024-04-24 RX ADMIN — ATORVASTATIN CALCIUM 40 MG: 40 TABLET, FILM COATED ORAL at 08:27

## 2024-04-24 RX ADMIN — INSULIN LISPRO 10 UNITS: 100 INJECTION, SOLUTION INTRAVENOUS; SUBCUTANEOUS at 08:22

## 2024-04-24 RX ADMIN — POLYETHYLENE GLYCOL 3350 17 G: 17 POWDER, FOR SOLUTION ORAL at 08:27

## 2024-04-24 RX ADMIN — Medication 2000 UNITS: at 06:34

## 2024-04-24 RX ADMIN — INSULIN LISPRO 10 UNITS: 100 INJECTION, SOLUTION INTRAVENOUS; SUBCUTANEOUS at 11:37

## 2024-04-24 RX ADMIN — ACETAMINOPHEN 975 MG: 325 TABLET ORAL at 18:04

## 2024-04-24 RX ADMIN — DEXTROSE MONOHYDRATE 12.5 G: 25 INJECTION, SOLUTION INTRAVENOUS at 14:58

## 2024-04-24 RX ADMIN — DOCUSATE SODIUM 100 MG: 100 CAPSULE, LIQUID FILLED ORAL at 20:22

## 2024-04-24 RX ADMIN — DOCUSATE SODIUM 100 MG: 100 CAPSULE, LIQUID FILLED ORAL at 08:28

## 2024-04-24 RX ADMIN — ACETAMINOPHEN 975 MG: 325 TABLET ORAL at 09:20

## 2024-04-24 RX ADMIN — CLONIDINE HYDROCHLORIDE 0.1 MG: 0.1 TABLET ORAL at 10:36

## 2024-04-24 RX ADMIN — ACETAMINOPHEN 975 MG: 325 TABLET ORAL at 01:31

## 2024-04-24 RX ADMIN — ENOXAPARIN SODIUM 40 MG: 100 INJECTION SUBCUTANEOUS at 06:34

## 2024-04-24 RX ADMIN — GABAPENTIN 300 MG: 300 CAPSULE ORAL at 20:22

## 2024-04-24 ASSESSMENT — COGNITIVE AND FUNCTIONAL STATUS - GENERAL
TURNING FROM BACK TO SIDE WHILE IN FLAT BAD: A LOT
MOVING TO AND FROM BED TO CHAIR: TOTAL
STANDING UP FROM CHAIR USING ARMS: TOTAL
TOILETING: TOTAL
WALKING IN HOSPITAL ROOM: TOTAL
MOBILITY SCORE: 8
EATING MEALS: A LITTLE
HELP NEEDED FOR BATHING: A LOT
MOVING FROM LYING ON BACK TO SITTING ON SIDE OF FLAT BED WITH BEDRAILS: A LOT
MOVING TO AND FROM BED TO CHAIR: TOTAL
TURNING FROM BACK TO SIDE WHILE IN FLAT BAD: A LOT
PERSONAL GROOMING: A LITTLE
DRESSING REGULAR UPPER BODY CLOTHING: A LITTLE
WALKING IN HOSPITAL ROOM: TOTAL
CLIMB 3 TO 5 STEPS WITH RAILING: TOTAL
STANDING UP FROM CHAIR USING ARMS: TOTAL
DAILY ACTIVITIY SCORE: 14
MOBILITY SCORE: 8
DRESSING REGULAR LOWER BODY CLOTHING: A LOT
CLIMB 3 TO 5 STEPS WITH RAILING: TOTAL
MOVING FROM LYING ON BACK TO SITTING ON SIDE OF FLAT BED WITH BEDRAILS: A LOT

## 2024-04-24 ASSESSMENT — PAIN SCALES - GENERAL
PAINLEVEL_OUTOF10: 2
PAINLEVEL_OUTOF10: 3

## 2024-04-24 ASSESSMENT — PAIN - FUNCTIONAL ASSESSMENT: PAIN_FUNCTIONAL_ASSESSMENT: 0-10

## 2024-04-24 NOTE — CARE PLAN
The patient's goals for the shift include pt will rate pain 6/10 or less -met     The clinical goals for the shift include pt will have controlled blood sugars -not met/progressing. Pt had issue with both hypoglycemia and hyperglycemia this shift, but blood sugar has stabilized by end of shift    Pt will mobilize with therapy this shift -met

## 2024-04-24 NOTE — PROGRESS NOTES
....[facility]  TRAUMA SERVICE - PROGRESS NOTE    Patient Name: Nuris Squires  MRN: [id number]  Admit Date: 415  : 1965  AGE: 58 y.o.   GENDER: female  ==============================================================================  MECHANISM OF INJURY:   57 y/o F s/p mGLF onto L knee.     Injuries/problems:  - L distal femur fx  - Anemia  -HLD, HTN, IDDm (A1C 11.9/295), CKD (creat 1.9-2.1), hypoxia (2L home as need)    INCIDENTAL FINDINGS:  None    PROCEDURES:  : L femur ORIF    ==============================================================================  TODAY'S ASSESSMENT AND PLAN OF CARE:    #Comminuted and moderately displaced intra-articular fracture of the L distal femur    -->Ortho sign off:        -NWB LLE, ROMAT  -Pain control: tylenol scheduled, prn oxy 2.5 q6 as needed, home miguel a per below  -PT/OT rec'd SNF     #Anemia  -Hgb 6.5 today from 7.0  -Records reviewed reveal average Hgb in the 8s  -Patient denies knowledge of history of anemia  -Asymptomatic  -Continue to monitor for signs and symptoms of bleeding  -Consider transfusion if clinically significant drop in Hgb  -Repeat CBC in a.m.    #Comorbids   -Cont home amlodipine, aspirin, atorvastatin, clonidine, gabapentin, folic acid and sliding scale insulin humalog #2.   -glucose POCT with meals, increased home 12 units lantus to 18 units bedtime (req 24 units/24 hrs), cont home sched 10 units lispro with meals  - home 2L O2 as needed  - Needs PCP follow up     #FEN//GI  -voiding freely  -on Miralax, colace/senna bid and lactulose as needed  -diabetic diet     #DVT ppx  -LVX 40 mg BID (Crcl 72, BMI 41)  -SCDs      Dispo: medically clear for discharge to SNF     Discussed with attending, Dr. Rayray Alcala PADonnC  Trauma Surgery Service  [phone]    ==============================================================================  CHIEF COMPLAINT / OVERNIGHT EVENTS:   No acute overnight  events. Pain controlled. Yet to have BM this admission    MEDICAL HISTORY / ROS:  Admission history and ROS reviewed. Pertinent changes as follows:  none    PHYSICAL EXAM:  Heart Rate:  [73-85]   Temp:  [36.5 °C (97.7 °F)-37 °C (98.6 °F)]   Resp:  [17-18]   BP: (130-157)/(68-90)   SpO2:  [92 %-93 %]   Physical Exam  Constitutional:       Appearance: Normal appearance.   HENT:      Head: Normocephalic and atraumatic.      Right Ear: External ear normal.      Left Ear: External ear normal.      Nose: Nose normal.      Mouth/Throat:      Mouth: Mucous membranes are moist.   Eyes:      Pupils: Pupils are equal, round, and reactive to light.   Cardiovascular:      Rate and Rhythm: Normal rate and regular rhythm.      Pulses: Normal pulses.      Heart sounds: Normal heart sounds.   Pulmonary:      Effort: Pulmonary effort is normal.      Breath sounds: Normal breath sounds.      Comments: Breathing comfortably on room air  Abdominal:      General: Bowel sounds are normal. There is no distension.      Palpations: Abdomen is soft.      Tenderness: There is no abdominal tenderness.      Comments: obese   Musculoskeletal:      Cervical back: Normal range of motion and neck supple.      Comments: TTP of left femur, LLE wrapped, NVI   Skin:     General: Skin is warm and dry.      Capillary Refill: Capillary refill takes less than 2 seconds.   Neurological:      Mental Status: She is alert and oriented to person, place, and time.   Psychiatric:         Mood and Affect: Mood normal.         Behavior: Behavior normal.         IMAGING SUMMARY:   No new imaging    LABS:  Results from last 7 days   Lab Units 04/23/24  0803 04/19/24  0547 04/17/24  0901   WBC AUTO x10*3/uL 8.8 10.1 8.7   HEMOGLOBIN g/dL 6.5* 7.0* 7.1*   HEMATOCRIT % 19.4* 22.4* 21.5*   PLATELETS AUTO x10*3/uL 418 243 217   NEUTROS PCT AUTO %  --  65.3  --    LYMPHS PCT AUTO %  --  23.2  --    MONOS PCT AUTO %  --  9.4  --    EOS PCT AUTO %  --  1.3  --             Results from last 7 days   Lab Units 04/23/24  0803 04/19/24  0636 04/17/24  0901   SODIUM mmol/L 138 135* 134*   POTASSIUM mmol/L 5.0 4.9 4.5   CHLORIDE mmol/L 105 103 102   CO2 mmol/L 23 22 23   BUN mg/dL 36* 37* 31*   CREATININE mg/dL 1.64* 2.20* 2.03*   CALCIUM mg/dL 8.6 8.2* 8.4*   GLUCOSE mg/dL 223* 189* 216*                 I have reviewed all medications, laboratory results, and imaging pertinent for today's encounter.

## 2024-04-24 NOTE — NURSING NOTE
Requested Oneida Alcala to see pt at bedside due to lethargy and reporting of weakness. At this time, will delay discharge due to symptoms

## 2024-04-24 NOTE — CARE PLAN
The patient's goals for the shift include rest    The clinical goals for the shift include Patient will remain safe and pain controlled this shift.      Problem: Pain  Goal: My pain/discomfort is manageable  Outcome: Progressing     Problem: Safety  Goal: Patient will be injury free during hospitalization  Outcome: Progressing     Problem: Daily Care  Goal: Daily care needs are met  Outcome: Progressing

## 2024-04-24 NOTE — PROGRESS NOTES
"....[facility]  TRAUMA SERVICE - PROGRESS NOTE    Patient Name: Nuris Squires  MRN: [id number]  Admit Date: 415  : 1965  AGE: 58 y.o.   GENDER: female  ==============================================================================  MECHANISM OF INJURY:   59 y/o F s/p mGLF onto L knee.     Injuries/problems:  - L distal femur fx  - Anemia  -HLD, HTN, IDDm (A1C 11.9/295), CKD (creat 1.9-2.1), hypoxia (2L home as need)    INCIDENTAL FINDINGS:  None    PROCEDURES:  : L femur ORIF    ==============================================================================  TODAY'S ASSESSMENT AND PLAN OF CARE:    #Comminuted and moderately displaced intra-articular fracture of the L distal femur    -->Ortho sign off:        -NWB LLE, ROMAT  -Pain control: tylenol scheduled, not using oxy as it makes her \"loopy,\" home miguel a   -PT/OT rec'd SNF  -Mepiplex removed today     #Anemia  -Hgb 6.2 from 6.5 from 7.0, asymptomatic   -Records reviewed reveal average Hgb in the 8s  -Patient denies knowledge of history of anemia  -Continue to monitor for signs and symptoms of bleeding  -Consider transfusion if clinically significant drop in Hgb  -Repeat CBC in a.m.    #Comorbids   -Cont home amlodipine, aspirin, atorvastatin, clonidine, gabapentin, folic acid and sliding scale insulin humalog #2.   -glucose POCT with meals, increased home 12 units lantus to 18 units bedtime (req 24 units/24 hrs), cont home sched 10 units lispro with meals  - home 2L O2 as needed  - Needs PCP follow up     #FEN//GI  -voiding freely  -on Miralax, colace/senna bid and lactulose as needed  -diabetic diet     #DVT ppx  -LVX 40 mg BID (Crcl 72, BMI 41)  -SCDs      Dispo:  discharge to [facility]      Discussed with attending, Dr. Rayray Alcala PAPRAVEEN  Trauma Surgery Service  [phone]    ==============================================================================  CHIEF COMPLAINT / OVERNIGHT " EVENTS:   No acute overnight events. Pain controlled. Yet to have BM this admission.     2pm update: Patient was to discharge to SNF when nurse called provider to bedside.  Nurse went into provide discharge instructions when patient was noted to be altered and lethargic.  Upon arrival to bedside, patient was able to answer questions but could not keep eye contact and appeared somnolent.  Vitals taken and were stable.  Glucose checked and was noted to be 57.  Patient given glucose and decided to remain overnight for further observation.  She reports eating her first meal at 11 AM after receiving overnight insulin and 10 units with her meal.  Plan for transfer to SNF tomorrow if she improves.    MEDICAL HISTORY / ROS:  Admission history and ROS reviewed. Pertinent changes as follows:  none    PHYSICAL EXAM:  Heart Rate:  [66-94]   Temp:  [35.9 °C (96.6 °F)-36.8 °C (98.2 °F)]   Resp:  [14-18]   BP: (108-131)/(58-73)   SpO2:  [92 %-95 %]   Physical Exam  Constitutional:       Appearance: Normal appearance.   HENT:      Head: Normocephalic and atraumatic.      Right Ear: External ear normal.      Left Ear: External ear normal.      Nose: Nose normal.      Mouth/Throat:      Mouth: Mucous membranes are moist.   Eyes:      Pupils: Pupils are equal, round, and reactive to light.   Cardiovascular:      Rate and Rhythm: Normal rate and regular rhythm.      Pulses: Normal pulses.      Heart sounds: Normal heart sounds.   Pulmonary:      Effort: Pulmonary effort is normal.      Breath sounds: Normal breath sounds.      Comments: Breathing comfortably on room air  Abdominal:      General: Bowel sounds are normal. There is no distension.      Palpations: Abdomen is soft.      Tenderness: There is no abdominal tenderness.      Comments: obese   Musculoskeletal:      Cervical back: Normal range of motion and neck supple.      Comments: TTP of left femur, soft compartments   Multiple surgical incision sites to LLE with staples in  place.  Well-healing.   Skin:     General: Skin is warm and dry.      Capillary Refill: Capillary refill takes less than 2 seconds.   Neurological:      Mental Status: She is alert and oriented to person, place, and time.   Psychiatric:         Mood and Affect: Mood normal.         Behavior: Behavior normal.         IMAGING SUMMARY:   No new imaging    LABS:  Results from last 7 days   Lab Units 04/24/24  1132 04/23/24  0803 04/19/24  0547   WBC AUTO x10*3/uL 9.7 8.8 10.1   HEMOGLOBIN g/dL 6.2* 6.5* 7.0*   HEMATOCRIT % 19.9* 19.4* 22.4*   PLATELETS AUTO x10*3/uL 469* 418 243   NEUTROS PCT AUTO %  --   --  65.3   LYMPHS PCT AUTO %  --   --  23.2   MONOS PCT AUTO %  --   --  9.4   EOS PCT AUTO %  --   --  1.3           Results from last 7 days   Lab Units 04/24/24  1132 04/23/24  0803 04/19/24  0636   SODIUM mmol/L 139 138 135*   POTASSIUM mmol/L 5.0 5.0 4.9   CHLORIDE mmol/L 106 105 103   CO2 mmol/L 24 23 22   BUN mg/dL 39* 36* 37*   CREATININE mg/dL 1.93* 1.64* 2.20*   CALCIUM mg/dL 8.3* 8.6 8.2*   GLUCOSE mg/dL 139* 223* 189*                 I have reviewed all medications, laboratory results, and imaging pertinent for today's encounter.

## 2024-04-24 NOTE — PROGRESS NOTES
Physical Therapy    Physical Therapy Treatment    Patient Name: Nuris Squires  MRN: [id number]  Today's Date: 4/24/2024  Time Calculation  Start Time: 1212  Stop Time: 1237  Time Calculation (min): 25 min       Assessment/Plan   PT Assessment  End of Session Communication: Bedside nurse  Assessment Comment: Pt was able to perform STS with max A x 2 and WW. Pt with difficulty maintaining precautions standing despite max verbal & tactile cueing. Remains limited by pain, dec strength, balance, & endurance.  Would benefit from continued skilled therapy.  End of Session Patient Position: Bed, 3 rail up, Alarm off, not on at start of session  PT Plan  Inpatient/Swing Bed or Outpatient: Inpatient  PT Plan  Treatment/Interventions: Bed mobility, Transfer training, Gait training, Stair training, Balance training, Strengthening, Endurance training, Therapeutic exercise, Therapeutic activity, Home exercise program  PT Plan: Skilled PT  PT Frequency: 4 times per week  PT Discharge Recommendations: Moderate intensity level of continued care  PT Recommended Transfer Status: Assist x2  PT - OK to Discharge: Yes      General Visit Information:   PT  Visit  PT Received On: 04/24/24  Response to Previous Treatment: Patient with no complaints from previous session.  General  Reason for Referral: s/p L femur ORIF and rIMN  Past Medical History Relevant to Rehab: DM, HTN, obesity, charcot R foot sp ankle fusion  Family/Caregiver Present: No  Caregiver Feedback: boyfriend present and supportive  Co-Treatment: OT  Co-Treatment Reason: Pt seen with OT to maximize pt safety & to maximize therapeutic potential  Prior to Session Communication: Bedside nurse  Patient Position Received: Bed, 3 rail up, Alarm off, not on at start of session  General Comment: Pt appears alert, awake this date; cooperative, willing to participate in session.  Anticipated dc today.    Subjective   Precautions:  Precautions  LE Weight Bearing Status: Left Non-Weight  Bearing  Medical Precautions: Fall precautions    Objective   Pain:  Pain Assessment  Pain Assessment: 0-10  Pain Score: 3  Pain Type: Surgical pain, Acute pain  Pain Location: Leg  Pain Orientation: Left  Pain Interventions: Repositioned, Rest, Elevated  Cognition:  Cognition  Overall Cognitive Status: Within Functional Limits  Arousal/Alertness:  (Drowsy with delayed responses)  Postural Control:  Postural Control  Postural Control: Within Functional Limits  Static Sitting Balance  Static Sitting-Balance Support: Feet supported, Bilateral upper extremity supported  Static Sitting-Level of Assistance: Close supervision  Static Standing Balance  Static Standing-Balance Support: Bilateral upper extremity supported  Static Standing-Level of Assistance: Maximum assistance (x2)  Extremity/Trunk Assessments:  LLE   LLE :  (Indira knee flex to ~50 degrees with assist)  Activity Tolerance:  Activity Tolerance  Endurance: Tolerates 10 - 20 min exercise with multiple rests  Treatments:  Therapeutic Exercise  Therapeutic Exercise Performed: Yes  Therapeutic Exercise Activity 1: BLE APs x10; RLE LAQ x10; LLE LAQ with assist x10, knee flex with assist x 10    Balance/Neuromuscular Re-Education  Balance/Neuromuscular Re-Education Activity Performed: Yes  Balance/Neuromuscular Re-Education Activity 1: Static standing <45sec each trial with maxAx2 at WW; max verbal and tactile cueing for lateral WSing to RLE and inc use of UEs on AD to maintain LLE NWB - poor carryover and difficulty maintaining NWB & completing lateral WSing    Bed Mobility  Bed Mobility: Yes  Bed Mobility 1  Bed Mobility 1: Supine to sitting  Level of Assistance 1: Moderate assistance, Moderate verbal cues  Bed Mobility Comments 1: cueing for sequencing and technique  Bed Mobility 2  Bed Mobility  2: Sitting to supine  Level of Assistance 2: Moderate assistance (x2)  Bed Mobility Comments 2: inc cueing for sequencing and hand placement    Ambulation/Gait  Training  Ambulation/Gait Training Performed: No  Transfers  Transfer: Yes  Transfer 1  Technique 1: Sit to stand, Stand to sit  Transfer Device 1: Walker  Transfer Level of Assistance 1: Maximum assistance, +2, Moderate verbal cues, Maximum tactile cues  Trials/Comments 1: x3 trials (inc difficulty maintaining LLE NWB depsite max verbal and tactile cueing, requiring therapist assist to maintain)    Stairs  Stairs: No    Outcome Measures:  [facility] Basic Mobility  Turning from your back to your side while in a flat bed without using bedrails: A lot  Moving from lying on your back to sitting on the side of a flat bed without using bedrails: A lot  Moving to and from bed to chair (including a wheelchair): Total  Standing up from a chair using your arms (e.g. wheelchair or bedside chair): Total  To walk in hospital room: Total  Climbing 3-5 steps with railing: Total  Basic Mobility - Total Score: 8    Education Documentation  Precautions, taught by Jing Reardon, PT at 4/24/2024 12:53 PM.  Learner: Patient  Readiness: Acceptance  Method: Explanation  Response: Needs Reinforcement    Body Mechanics, taught by Jing Reardon PT at 4/24/2024 12:53 PM.  Learner: Patient  Readiness: Acceptance  Method: Explanation  Response: Needs Reinforcement    Mobility Training, taught by Jing Reardon PT at 4/24/2024 12:53 PM.  Learner: Patient  Readiness: Acceptance  Method: Explanation  Response: Needs Reinforcement    Education Comments  No comments found.        OP EDUCATION:       Encounter Problems       Encounter Problems (Active)       PT Problem       Pt. will perform bed mobility with min A x 1  (Progressing)       Start:  04/18/24    Expected End:  05/02/24            Pt. will perform transfers min A x 1 with RW while maintaining NWB LLE  (Progressing)       Start:  04/18/24    Expected End:  05/02/24            Pt. will ambulate > 10 ft. with min A and RW for short household ambulation  (Not Progressing)        Start:  04/18/24    Expected End:  05/02/24            Pt. will ascend/descend 5 steps with mod A x 1 to safely enter/exit the home set up  (Not Progressing)       Start:  04/18/24    Expected End:  05/02/24 04/24/24 at 12:54 PM - Jing Reardon, PT

## 2024-04-24 NOTE — PROGRESS NOTES
Auth received. SW requested 1400 stretcher transport. Pending Roundtrip confirmation and finalized [facility].    Chris Shira and sons confirmed 1400  for dc to [facility]. Report # [id number]. WINTER updated TCC, the floor, patient and SNF. DSC notified for 7000.    DELORIS Valencia

## 2024-04-24 NOTE — NURSING NOTE
Per Oneida Alcala PA-C, pt will not delay to discharge to rehab despite hemoglobin of 6.2 since pt is asymptomatic with stable vital signs.

## 2024-04-25 ENCOUNTER — APPOINTMENT (OUTPATIENT)
Dept: PULMONOLOGY | Facility: CLINIC | Age: 59
End: 2024-04-25
Payer: COMMERCIAL

## 2024-04-25 VITALS
OXYGEN SATURATION: 94 % | HEIGHT: 62 IN | RESPIRATION RATE: 16 BRPM | SYSTOLIC BLOOD PRESSURE: 134 MMHG | BODY MASS INDEX: 41.41 KG/M2 | WEIGHT: 225 LBS | HEART RATE: 73 BPM | TEMPERATURE: 97.9 F | DIASTOLIC BLOOD PRESSURE: 76 MMHG

## 2024-04-25 LAB
GLUCOSE BLD MANUAL STRIP-MCNC: 278 MG/DL (ref 74–99)
GLUCOSE BLD MANUAL STRIP-MCNC: 327 MG/DL (ref 74–99)

## 2024-04-25 PROCEDURE — 2500000001 HC RX 250 WO HCPCS SELF ADMINISTERED DRUGS (ALT 637 FOR MEDICARE OP): Performed by: PHYSICIAN ASSISTANT

## 2024-04-25 PROCEDURE — 82947 ASSAY GLUCOSE BLOOD QUANT: CPT

## 2024-04-25 PROCEDURE — 2500000002 HC RX 250 W HCPCS SELF ADMINISTERED DRUGS (ALT 637 FOR MEDICARE OP, ALT 636 FOR OP/ED)

## 2024-04-25 PROCEDURE — 2500000001 HC RX 250 WO HCPCS SELF ADMINISTERED DRUGS (ALT 637 FOR MEDICARE OP)

## 2024-04-25 PROCEDURE — 2500000004 HC RX 250 GENERAL PHARMACY W/ HCPCS (ALT 636 FOR OP/ED): Performed by: PHYSICIAN ASSISTANT

## 2024-04-25 RX ADMIN — CLONIDINE HYDROCHLORIDE 0.1 MG: 0.1 TABLET ORAL at 09:01

## 2024-04-25 RX ADMIN — SENNOSIDES 8.6 MG: 8.6 TABLET, FILM COATED ORAL at 09:01

## 2024-04-25 RX ADMIN — POLYETHYLENE GLYCOL 3350 17 G: 17 POWDER, FOR SOLUTION ORAL at 09:01

## 2024-04-25 RX ADMIN — ATORVASTATIN CALCIUM 40 MG: 40 TABLET, FILM COATED ORAL at 09:01

## 2024-04-25 RX ADMIN — GABAPENTIN 300 MG: 300 CAPSULE ORAL at 09:01

## 2024-04-25 RX ADMIN — FOLIC ACID 1 MG: 1 TABLET ORAL at 09:01

## 2024-04-25 RX ADMIN — ASPIRIN 81 MG 81 MG: 81 TABLET ORAL at 09:01

## 2024-04-25 RX ADMIN — ACETAMINOPHEN 975 MG: 325 TABLET ORAL at 09:01

## 2024-04-25 RX ADMIN — INSULIN LISPRO 10 UNITS: 100 INJECTION, SOLUTION INTRAVENOUS; SUBCUTANEOUS at 09:01

## 2024-04-25 RX ADMIN — Medication 2000 UNITS: at 05:12

## 2024-04-25 RX ADMIN — INSULIN LISPRO 10 UNITS: 100 INJECTION, SOLUTION INTRAVENOUS; SUBCUTANEOUS at 11:42

## 2024-04-25 RX ADMIN — LACTULOSE 20 G: 20 SOLUTION ORAL at 09:01

## 2024-04-25 RX ADMIN — AMLODIPINE BESYLATE 10 MG: 10 TABLET ORAL at 09:01

## 2024-04-25 RX ADMIN — INSULIN LISPRO 8 UNITS: 100 INJECTION, SOLUTION INTRAVENOUS; SUBCUTANEOUS at 11:40

## 2024-04-25 RX ADMIN — DOCUSATE SODIUM 100 MG: 100 CAPSULE, LIQUID FILLED ORAL at 09:01

## 2024-04-25 RX ADMIN — ACETAMINOPHEN 975 MG: 325 TABLET ORAL at 01:15

## 2024-04-25 ASSESSMENT — PAIN SCALES - GENERAL
PAINLEVEL_OUTOF10: 0 - NO PAIN
PAINLEVEL_OUTOF10: 5 - MODERATE PAIN

## 2024-04-25 ASSESSMENT — PAIN - FUNCTIONAL ASSESSMENT
PAIN_FUNCTIONAL_ASSESSMENT: 0-10
PAIN_FUNCTIONAL_ASSESSMENT: 0-10

## 2024-04-25 NOTE — PROGRESS NOTES
Patient ready for dc today. [facility] confirmed 12noon stretcher . SW updated the floor, patient, TCC, MD, and SNF. 7000 complete.      DELORIS Valencia

## 2024-04-25 NOTE — DISCHARGE SUMMARY
Discharge Diagnosis  Closed fracture of left femur (Multi)    Issues Requiring Follow-Up  Follow up with Orthopedic Surgery (Dr. Rodriguez) 3 weeks after Surgery, can call [phone]  Follow up with Trauma Surgery Clinic as Needed  Follow up with Primary Care Provider after discharge     Test Results Pending At Discharge  Pending Labs       No current pending labs.            Hospital Course  Nuris Squires is a 59 y/o F who had a mGLF onto her L knee on 4/15.  Pt states she was cooking on the stove and twisted her knee ultimately falling onto her left knee. She denies dizziness, lightheadedness, or LOC at time of event. She does endorse falling more often, last fall about a month ago. She lives at home with boyfriend. She denies chest pain and SOB at time of exam. Pt has a pmh of DM, HTN, IDDM, CKD, and Hypoxia on 2L O2 NC as needed at home. Imaging was obtained and reveled an isolated comminuted and moderately displaced intra-articular fracture of the Left distal femur for which the Orthopedic teams were consulted and are recommending surgery. Pt [facility] under trauma surgery. Pt was taken to the  OR on 04/16 for a Left femur IMN. Procedure was completed without complications, patient tolerated well and returned to trauma nursing floor after recovery in PACU. Pt. Will be NWB in her LLE, ok for ROMAT at knee, and will follow up with orthopedics in ~ 3 weeks. Pt. Was started on home medications. Pt has been tolerating an oral diet with adequate pain control on PO medications. Labs have remained acceptable. Upon evaluation by PT/OT pt has been recommended SNF. Pt. Lantus dosing was increased 4/22 secondary to elevated BGL.  Of note A1C >11. To require PCP follow up. Asymptomatic acute on chronic anemia due to post-operative blood loss led to no transfusion near discharge.  Day of supposed discharge, patient was feeling lethargic due to blood glucose of 57. Patient's sliding scale was held at that time.  Patient's clinical status continued to improve after her meal. Patient's transport to facility was delayed to next day (4/25). At day of discharge, patient's clinical status had improved. Will require follow up with Orthopedic Surgery and her Primary Care provider.     Pertinent Physical Exam At Time of Discharge  Physical Exam  Constitutional:       Appearance: Normal appearance.   HENT:      Head: Normocephalic and atraumatic.      Right Ear: External ear normal.      Left Ear: External ear normal.      Nose: Nose normal.      Mouth/Throat:      Mouth: Mucous membranes are moist.   Eyes:      Pupils: Pupils are equal, round, and reactive to light.   Cardiovascular:      Rate and Rhythm: Normal rate and regular rhythm.      Pulses: Normal pulses.      Heart sounds: Normal heart sounds.   Pulmonary:      Effort: Pulmonary effort is normal.      Breath sounds: Normal breath sounds.      Comments: Breathing comfortably on room air  Abdominal:      General: Bowel sounds are normal. There is no distension.      Palpations: Abdomen is soft.      Tenderness: There is no abdominal tenderness.      Comments: obese   Musculoskeletal:      Cervical back: Normal range of motion and neck supple.      Comments: TTP of left femur, soft compartments   Multiple surgical incision sites to LLE with staples in place.  Well-healing.   Skin:     General: Skin is warm and dry.      Capillary Refill: Capillary refill takes less than 2 seconds.   Neurological:      Mental Status: She is alert and oriented to person, place, and time.   Psychiatric:         Mood and Affect: Mood normal.         Behavior: Behavior normal.     Home Medications     Medication List      START taking these medications     docusate sodium 100 mg capsule; Commonly known as: Colace; Take 1   capsule (100 mg) by mouth 2 times a day as needed for constipation.   insulin glargine 100 unit/mL injection; Commonly known as: Lantus;   Inject 18 Units under the skin once  daily at bedtime. Take as directed per   insulin instructions.; Replaces: Basaglar KwikPen U-100 Insulin 100   unit/mL (3 mL) pen   lactulose 20 gram/30 mL oral solution; Take 30 mL (20 g) by mouth once   daily as needed (constipation).   ondansetron 4 mg tablet; Commonly known as: Zofran; Take 1 tablet (4 mg)   by mouth every 8 hours if needed for nausea or vomiting.     CONTINUE taking these medications     acetaminophen 325 mg tablet; Commonly known as: Tylenol; Take 2 tablets   (650 mg) by mouth every 4 hours if needed for mild pain (1 - 3).   amLODIPine 10 mg tablet; Commonly known as: Norvasc; Take 1 tablet (10   mg) by mouth once daily.   aspirin 81 mg chewable tablet; Chew 1 tablet (81 mg) once daily.   atorvastatin 40 mg tablet; Commonly known as: Lipitor; TAKE ONE TABLET   BY MOUTH EVERY DAY   b complex vitamins capsule   cholecalciferol 50 mcg (2,000 unit) capsule; Commonly known as: Vitamin   D-3; Take 1 capsule (50 mcg) by mouth once daily.   cloNIDine 0.1 mg tablet; Commonly known as: Catapres; Take 1 tablet (0.1   mg) by mouth 2 times a day.   folic acid 1 mg tablet; Commonly known as: Folvite   FreeStyle Remington 3 [place] misc; Generic drug: blood-glucose   meter,continuous; Use as instructed to monitor blood glucose.   FreeStyle Remington 3 Sensor device; Generic drug: blood-glucose sensor; Use   to monitor blood glucose. Change sensor every 14 days.   gabapentin 300 mg capsule; Commonly known as: Neurontin; TAKE ONE   CAPSULE BY MOUTH TWO TIMES A DAY   insulin lispro 100 unit/mL injection; Commonly known as: HumaLOG; Inject   0.1 mL (10 Units) under the skin 3 times a day with meals.   meclizine 25 mg tablet,chewable; Commonly known as: Antivert; Chew 1   tablet (25 mg) 3 times a day as needed (vertigo).   OneTouch Delica Plus Lancet 30 gauge misc; Generic drug: lancets; USE TO   TEST BLOOD SUGAR AS DIRECTED   OneTouch Ultra2 Meter misc; Generic drug: blood-glucose meter; use 1 to   2 times daily    "OneTouch Verio test strips strip; Generic drug: blood sugar diagnostic;   USE TO CHECK BLOOD SUGAR AS DIRECTED 1-2 times daily   pen needle, diabetic 32 gauge x 5/32\" needle; Use four times a day with   insulin use     STOP taking these medications     Basaglar KwikPen U-100 Insulin 100 unit/mL (3 mL) pen; Generic drug:   insulin glargine; Replaced by: insulin glargine 100 unit/mL injection       Outpatient Follow-Up  Future Appointments   Date Time Provider Department Center   4/29/2024  9:30 AM Mone Aquino MD [id number] [facility]   5/6/2024 10:00 AM PHARMACY [facility] [id number]   5/9/2024  8:15 AM Barrera Rodriguez MD [id number] [facility]   7/17/2024  9:00 AM Katrina Burton MD [id number] [facility]       Slava Bronson MD  "

## 2024-04-25 NOTE — NURSING NOTE
Called [facility] two times to give report, no response. Will attempt later today.     Reviewed AVS with patient and spouse, all questions answered. Spouse took patients belonging, denies pain.

## 2024-04-26 ENCOUNTER — NURSING HOME VISIT (OUTPATIENT)
Dept: POST ACUTE CARE | Facility: EXTERNAL LOCATION | Age: 59
End: 2024-04-26
Payer: COMMERCIAL

## 2024-04-26 DIAGNOSIS — E78.49 OTHER HYPERLIPIDEMIA: ICD-10-CM

## 2024-04-26 DIAGNOSIS — S72.432S CLOSED BICONDYLAR FRACTURE OF DISTAL FEMUR, LEFT, SEQUELA: ICD-10-CM

## 2024-04-26 DIAGNOSIS — S72.422S CLOSED BICONDYLAR FRACTURE OF DISTAL FEMUR, LEFT, SEQUELA: ICD-10-CM

## 2024-04-26 DIAGNOSIS — I10 BENIGN ESSENTIAL HYPERTENSION: Primary | ICD-10-CM

## 2024-04-26 PROCEDURE — 99306 1ST NF CARE HIGH MDM 50: CPT | Performed by: INTERNAL MEDICINE

## 2024-04-26 NOTE — LETTER
Patient: Nuris Squires  : 1965    Encounter Date: 2024    History/Physical-  Nuris Suqires is a 57 y/o F who was admitted to [facility] after hospitalized after leg injury at home. Pt states she was cooking on the stove and twisted her knee and fell on her left knee. She denies LOC at time of event. She has h/o falling episodes.  She has a PMH of DM, HTN, IDDM, CKD, and Hypoxia on 2L O2 NC as needed at home.  Leg xrays showed comminuted and moderately displaced intra-articular fracture of the Left distal femur, Orthopedic teams were consulted and underwent surgery on  for a Left femur IMN. Procedure was completed without complications, she will be NWB in her LLE and will follow up with orthopedics in 3 weeks.  ROS- Gen- no fever  weakness +  Resp- no cough/SOB  Chest- no pain  Abdomen- no diarrhea  Pain in left rib and LUQ  Ext- pain and swelling in left leg, knee  Psych- calm    PE VS BP- 110/72 T 97.9 P 74 Wt 225 lbs  Gen- alert, lying in room  Neck- supple, no mass  Respiratory- CTA B/L  CVS- RRR S1S2  Abdomen- soft, non tender  Ext- left knee staples+  no pedal edema B/L  Neuro-appears normal, No gross motor deficits    Assessment/Plan-  Fall at home- Left femur fracture-  Open Reduction Internal Fixation Femur (Left: Knee)  Insertion Intramedullary Nail Femur  Start tramadol 50 mg q 6-8 hrs for pain  HTN- c/w amlodipine, clonidine  HLD- c/w lipitor  PT/OT eval and rx      Electronically Signed By: Navya Travis MD   24  5:26 PM

## 2024-04-26 NOTE — PROGRESS NOTES
History/Physical-  Nuris Squires is a 59 y/o F who was admitted to [facility] after hospitalized after leg injury at home. Pt states she was cooking on the stove and twisted her knee and fell on her left knee. She denies LOC at time of event. She has h/o falling episodes.  She has a PMH of DM, HTN, IDDM, CKD, and Hypoxia on 2L O2 NC as needed at home.  Leg xrays showed comminuted and moderately displaced intra-articular fracture of the Left distal femur, Orthopedic teams were consulted and underwent surgery on 04/16 for a Left femur IMN. Procedure was completed without complications, she will be NWB in her LLE and will follow up with orthopedics in 3 weeks.  ROS- Gen- no fever  weakness +  Resp- no cough/SOB  Chest- no pain  Abdomen- no diarrhea  Pain in left rib and LUQ  Ext- pain and swelling in left leg, knee  Psych- calm    PE VS BP- 110/72 T 97.9 P 74 Wt 225 lbs  Gen- alert, lying in room  Neck- supple, no mass  Respiratory- CTA B/L  CVS- RRR S1S2  Abdomen- soft, non tender  Ext- left knee staples+  no pedal edema B/L  Neuro-appears normal, No gross motor deficits    Assessment/Plan-  Fall at home- Left femur fracture-  Open Reduction Internal Fixation Femur (Left: Knee)  Insertion Intramedullary Nail Femur  Start tramadol 50 mg q 6-8 hrs for pain  HTN- c/w amlodipine, clonidine  HLD- c/w lipitor  PT/OT eval and rx

## 2024-04-29 ENCOUNTER — NURSING HOME VISIT (OUTPATIENT)
Dept: POST ACUTE CARE | Facility: EXTERNAL LOCATION | Age: 59
End: 2024-04-29

## 2024-04-29 DIAGNOSIS — E66.01 SEVERE OBESITY (MULTI): ICD-10-CM

## 2024-04-29 DIAGNOSIS — M54.50 CHRONIC LOW BACK PAIN, UNSPECIFIED BACK PAIN LATERALITY, UNSPECIFIED WHETHER SCIATICA PRESENT: ICD-10-CM

## 2024-04-29 DIAGNOSIS — Z91.81 AT HIGH RISK FOR FALLS: ICD-10-CM

## 2024-04-29 DIAGNOSIS — R52 PAIN: ICD-10-CM

## 2024-04-29 DIAGNOSIS — Z79.4 TYPE 2 DIABETES MELLITUS WITH RIGHT EYE AFFECTED BY PROLIFERATIVE RETINOPATHY AND MACULAR EDEMA, WITH LONG-TERM CURRENT USE OF INSULIN (MULTI): ICD-10-CM

## 2024-04-29 DIAGNOSIS — E78.2 MIXED HYPERLIPIDEMIA: ICD-10-CM

## 2024-04-29 DIAGNOSIS — E55.9 VITAMIN D DEFICIENCY: ICD-10-CM

## 2024-04-29 DIAGNOSIS — R26.81 UNSTEADY GAIT: Primary | ICD-10-CM

## 2024-04-29 DIAGNOSIS — G89.29 CHRONIC LOW BACK PAIN, UNSPECIFIED BACK PAIN LATERALITY, UNSPECIFIED WHETHER SCIATICA PRESENT: ICD-10-CM

## 2024-04-29 DIAGNOSIS — I10 BENIGN ESSENTIAL HYPERTENSION: ICD-10-CM

## 2024-04-29 DIAGNOSIS — S72.8X2A OTHER CLOSED FRACTURE OF LEFT FEMUR, UNSPECIFIED PORTION OF FEMUR, INITIAL ENCOUNTER (MULTI): ICD-10-CM

## 2024-04-29 DIAGNOSIS — E11.3511 TYPE 2 DIABETES MELLITUS WITH RIGHT EYE AFFECTED BY PROLIFERATIVE RETINOPATHY AND MACULAR EDEMA, WITH LONG-TERM CURRENT USE OF INSULIN (MULTI): ICD-10-CM

## 2024-04-29 DIAGNOSIS — D64.9 ANEMIA, UNSPECIFIED TYPE: ICD-10-CM

## 2024-04-29 DIAGNOSIS — R42 VERTIGO: ICD-10-CM

## 2024-04-29 PROCEDURE — 99310 SBSQ NF CARE HIGH MDM 45: CPT | Performed by: FAMILY MEDICINE

## 2024-04-29 NOTE — LETTER
Patient: Nuris Squires  : 1965    Encounter Date: 2024    Nursing Home Visit  Name: Nuris Squires  YOB: 1965  MRN: [id number]    Chief Complaint  Follow up on new admission  Subjective  HPI:   Ms. Nuris Squires is a pleasant 59 yo female who has been admitted for PT/OT to regain mobility and strength following a fall at home with left femur fracture s/p surgery on . She states she was at home in her kitchen when she fell on her left knee, denies LOC at time of event. She has a PMH of falls, DM, HTN, IDDM, CKD, on prn oxygen at home 2 L NC. During her hospital stay x-ray showed comminuted and moderately displaced intra-articular fracture of the left distal femur. She is to be NWB and follow up with ortho in May. Lives at home with her boyfriend.  ----: patient resting in wheelchair getting ready to work with PT this am. She denies pain at this time and states it has been well controlled with Tylenol.  Her left leg has surgical incision well approximated and closed with staples without redness, bleeding or bruising. Patient is eating an drinking well, she has no c/o of nausea, vomiting, diarrhea or constipation. She denies fevers, chill, chest pain, dizziness or swelling.    Review of Systems:  Reviewed chart looking at current medications, treatment, labs and x-rays and note pertinent positives or negatives, otherwise 10 point system review negative except for what is noted in HPI.    Objective  VS:  124/66, 97.5, 70, 0/10, 16, 98 % on RA, 225#    Physical exam:   Physical Exam   Constitutional:  General: She is awake. She is not in acute distress.  Appearance: Normal appearance. She is obese. She is not toxic-appearing.  HENT:  Head: Normocephalic and atraumatic.  Mouth/Throat:  Mouth: Mucous membranes are moist.  Cardiovascular:  Rate and Rhythm: Normal rate and regular rhythm.  Pulmonary:  Effort: Pulmonary effort is normal.no crackles or wheezes.  Abdominal:  General: Abdomen is  protruding. Patient is overweight  Palpations: Abdomen is round , soft.  Tenderness: There is no abdominal tenderness.  Musculoskeletal:  General: Normal range of motion. NWB and weakness in LLE  Skin:  General: Skin is warm and dry. Left knee incision closed with multiple staples and 4 other small incisions with 1-2 staple sites next to incision.  Neurological:  General: No focal deficit present.  Mental Status: She is alert and oriented to person, place, and time.  Psychiatric:  Attention and Perception: Attention and perception normal.  Mood and Affect: Mood and affect normal.  Speech: Speech normal.  Behavior: Behavior normal. Behavior is cooperative.  Cognition and Memory: alert and oriented x3, Cognition and memory normal.    Assessment/Plan     Unsteady gait  c/wPT/OT for mobility, gait training, endurance, safety awareness, ADLs, and assessment of post-rehab needs.    At high risk for falls  facility protocol for falls prevention    Other closed fracture of left femur, unspecified portion of femur, initial encounter (Multi)  C/w PT/OT, follow up with ortho, is stable. Staples to left knee intact    Pain  c/w Tylenol, gabapentin and tramadol.  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

## 2024-04-30 DIAGNOSIS — Z12.31 ENCOUNTER FOR SCREENING MAMMOGRAM FOR BREAST CANCER: ICD-10-CM

## 2024-04-30 NOTE — DOCUMENTATION CLARIFICATION NOTE
"    PATIENT:               ALBERT RIDLEY  ACCT #:                  [id number]  MRN:                       [id number]  :                       1965  ADMIT DATE:       4/15/2024 7:33 PM  DISCH DATE:        2024 1:23 PM  RESPONDING PROVIDER #:        [id number]          PROVIDER RESPONSE TEXT:    Chronic Kidney Disease    CDI QUERY TEXT:    Clarification        Instruction:    Based on your assessment of the patient and the clinical information, please provide the requested documentation by clicking on the appropriate radio button and enter any additional information if prompted.    Question: Please clarify a diagnosis for the patient renal status    When answering this query, please exercise your independent professional judgment. The fact that a question is being asked, does not imply that any particular answer is desired or expected.    The patient's clinical indicators include:  Clinical Information:    Progress Note 24 by Resident Nataly Demarco MD, Cosigned by: Mike Mascorro MD    \"59 y/o F s/p mGLF onto L knee. Pt states she was cooking on the stove and twisted her knee ultimately falling onto her left knee.\"    Clinical Indicators:    ED Provider Note 4/15/24 by Lali Cooper PA-C:    \"CMP notable for hyperglycemia with Glu 399 as well as elevated Alk phosphatase at 120 and evidence of decreased renal function consistent with CKD elevated sCr level which appears slightly improved compared to baseline.\"    Creatinine 1.36 on admit, increased to 2.03 on , as high as 2.20 on     BUN 30 on admit, 37 on     Treatment: received NS bolus , LR bolus on 4/15 and , along w/ continuous IVF and serial Cr levels.    Risk Factors: CKD  Options provided:  -- Acute Kidney Injury  -- Chronic Kidney Disease, Please specify stage below  -- VENKAT on CKD, Please specify stage below  -- Other - I will add my own diagnosis  -- Refer to Clinical Documentation Reviewer    Query created by: Suzi Gore " on 4/24/2024 11:52 AM      Electronically signed by:  CHEYANNE CARBAJAL MD 4/30/2024 2:21 PM

## 2024-05-01 ENCOUNTER — NURSING HOME VISIT (OUTPATIENT)
Dept: POST ACUTE CARE | Facility: EXTERNAL LOCATION | Age: 59
End: 2024-05-01
Payer: COMMERCIAL

## 2024-05-01 DIAGNOSIS — Z79.4 TYPE 2 DIABETES MELLITUS WITH RIGHT EYE AFFECTED BY PROLIFERATIVE RETINOPATHY AND MACULAR EDEMA, WITH LONG-TERM CURRENT USE OF INSULIN (MULTI): ICD-10-CM

## 2024-05-01 DIAGNOSIS — S72.8X2A OTHER CLOSED FRACTURE OF LEFT FEMUR, UNSPECIFIED PORTION OF FEMUR, INITIAL ENCOUNTER (MULTI): Primary | ICD-10-CM

## 2024-05-01 DIAGNOSIS — B37.9 CANDIDIASIS: ICD-10-CM

## 2024-05-01 DIAGNOSIS — E11.3511 TYPE 2 DIABETES MELLITUS WITH RIGHT EYE AFFECTED BY PROLIFERATIVE RETINOPATHY AND MACULAR EDEMA, WITH LONG-TERM CURRENT USE OF INSULIN (MULTI): ICD-10-CM

## 2024-05-01 DIAGNOSIS — E78.2 MIXED HYPERLIPIDEMIA: ICD-10-CM

## 2024-05-01 DIAGNOSIS — I10 BENIGN ESSENTIAL HYPERTENSION: ICD-10-CM

## 2024-05-01 PROBLEM — R26.81 UNSTEADY GAIT: Status: ACTIVE | Noted: 2024-05-01

## 2024-05-01 PROBLEM — E55.9 VITAMIN D DEFICIENCY: Status: ACTIVE | Noted: 2024-05-01

## 2024-05-01 PROBLEM — Z91.81 AT HIGH RISK FOR FALLS: Status: ACTIVE | Noted: 2024-05-01

## 2024-05-01 PROBLEM — R52 PAIN: Status: ACTIVE | Noted: 2024-05-01

## 2024-05-01 PROCEDURE — 99309 SBSQ NF CARE MODERATE MDM 30: CPT | Performed by: INTERNAL MEDICINE

## 2024-05-01 NOTE — PROGRESS NOTES
Nursing Home Visit  Name: Nuris Squires  YOB: 1965  MRN: [id number]    Chief Complaint  Follow up on new admission  Subjective  HPI:   Ms. Nuris Squires is a pleasant 57 yo female who has been admitted for PT/OT to regain mobility and strength following a fall at home with left femur fracture s/p surgery on 4/20. She states she was at home in her kitchen when she fell on her left knee, denies LOC at time of event. She has a PMH of falls, DM, HTN, IDDM, CKD, on prn oxygen at home 2 L NC. During her hospital stay x-ray showed comminuted and moderately displaced intra-articular fracture of the left distal femur. She is to be NWB and follow up with ortho in May. Lives at home with her boyfriend.  ----4/29: patient resting in wheelchair getting ready to work with PT this am. She denies pain at this time and states it has been well controlled with Tylenol.  Her left leg has surgical incision well approximated and closed with staples without redness, bleeding or bruising. Patient is eating an drinking well, she has no c/o of nausea, vomiting, diarrhea or constipation. She denies fevers, chill, chest pain, dizziness or swelling.    Review of Systems:  Reviewed chart looking at current medications, treatment, labs and x-rays and note pertinent positives or negatives, otherwise 10 point system review negative except for what is noted in HPI.    Objective  VS:  124/66, 97.5, 70, 0/10, 16, 98 % on RA, 225#    Physical exam:   Physical Exam   Constitutional:  General: She is awake. She is not in acute distress.  Appearance: Normal appearance. She is obese. She is not toxic-appearing.  HENT:  Head: Normocephalic and atraumatic.  Mouth/Throat:  Mouth: Mucous membranes are moist.  Cardiovascular:  Rate and Rhythm: Normal rate and regular rhythm.  Pulmonary:  Effort: Pulmonary effort is normal.no crackles or wheezes.  Abdominal:  General: Abdomen is protruding. Patient is overweight  Palpations: Abdomen is round ,  soft.  Tenderness: There is no abdominal tenderness.  Musculoskeletal:  General: Normal range of motion. NWB and weakness in LLE  Skin:  General: Skin is warm and dry. Left knee incision closed with multiple staples and 4 other small incisions with 1-2 staple sites next to incision.  Neurological:  General: No focal deficit present.  Mental Status: She is alert and oriented to person, place, and time.  Psychiatric:  Attention and Perception: Attention and perception normal.  Mood and Affect: Mood and affect normal.  Speech: Speech normal.  Behavior: Behavior normal. Behavior is cooperative.  Cognition and Memory: alert and oriented x3, Cognition and memory normal.    Assessment/Plan     Unsteady gait  c/wPT/OT for mobility, gait training, endurance, safety awareness, ADLs, and assessment of post-rehab needs.    At high risk for falls  facility protocol for falls prevention    Other closed fracture of left femur, unspecified portion of femur, initial encounter (Multi)  C/w PT/OT, follow up with ortho, is stable. Staples to left knee intact    Pain  c/w Tylenol, gabapentin and tramadol.  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

## 2024-05-01 NOTE — ASSESSMENT & PLAN NOTE
>>ASSESSMENT AND PLAN FOR BENIGN ESSENTIAL HYPERTENSION WRITTEN ON 5/1/2024  1:18 PM BY ALISSON DEL VALLE, TAL-CNP    c/w amlodipine and clonidine  -120s

## 2024-05-01 NOTE — ASSESSMENT & PLAN NOTE
>>ASSESSMENT AND PLAN FOR OTHER CLOSED FRACTURE OF LEFT FEMUR, UNSPECIFIED PORTION OF FEMUR, INITIAL ENCOUNTER (MULTI) WRITTEN ON 5/1/2024  1:14 PM BY TAL BUTLER-CNP    C/w PT/OT, follow up with ortho, is stable. Staples to left knee intact

## 2024-05-01 NOTE — ASSESSMENT & PLAN NOTE
c/wPT/OT for mobility, gait training, endurance, safety awareness, ADLs, and assessment of post-rehab needs.

## 2024-05-01 NOTE — ASSESSMENT & PLAN NOTE
>>ASSESSMENT AND PLAN FOR SEVERE OBESITY (MULTI) WRITTEN ON 5/1/2024  1:25 PM BY SEDA BUTLER    Encourage good eating habits

## 2024-05-01 NOTE — LETTER
Patient: Nuris Squires  : 1965    Encounter Date: 2024    : Subjective- resident seen for follow up. today she is sitting in her room. She c/o pain in left knee, better with tramadol.  ROS- Gen- no fever  weakness +  Resp- no cough/SOB  Chest- no pain  Abdomen- no diarrhea  No abd pain  Ext- pain left leg, knee  Psych- calm  PE VS BP- 151/80 T 97.3 P 74 Wt 225 lbs  Gen- alert, sitting in room  Neck- supple, no mass  Respiratory- CTA B/L  CVS- RRR S1S2  Abdomen- soft, non tender  Ext- left knee staples+  no pedal edema B/L  Neuro-appears normal, No gross motor deficits    Assessment/Plan-  Fall at home- Left femur fracture-  Open Reduction Internal Fixation Femur (Left: Knee)  Insertion Intramedullary Nail Femur  c/w tramadol 50 mg q 6-8 hrs for pain  Follows Orthopedics on  for staple removal  HTN- c/w amlodipine, clonidine  HLD- c/w lipitpr  Perineal itching- started on diflucan  c/w PT/OT      Electronically Signed By: Navya Travis MD   24  9:42 AM

## 2024-05-02 NOTE — PROGRESS NOTES
: Subjective- resident seen for follow up. today she is sitting in her room. She c/o pain in left knee, better with tramadol.  ROS- Gen- no fever  weakness +  Resp- no cough/SOB  Chest- no pain  Abdomen- no diarrhea  No abd pain  Ext- pain left leg, knee  Psych- calm  PE VS BP- 151/80 T 97.3 P 74 Wt 225 lbs  Gen- alert, sitting in room  Neck- supple, no mass  Respiratory- CTA B/L  CVS- RRR S1S2  Abdomen- soft, non tender  Ext- left knee staples+  no pedal edema B/L  Neuro-appears normal, No gross motor deficits    Assessment/Plan-  Fall at home- Left femur fracture-  Open Reduction Internal Fixation Femur (Left: Knee)  Insertion Intramedullary Nail Femur  c/w tramadol 50 mg q 6-8 hrs for pain  Follows Orthopedics on 5/9 for staple removal  HTN- c/w amlodipine, clonidine  HLD- c/w lipitpr  Perineal itching- started on diflucan  c/w PT/OT

## 2024-05-06 ENCOUNTER — APPOINTMENT (OUTPATIENT)
Dept: PHARMACY | Facility: HOSPITAL | Age: 59
End: 2024-05-06
Payer: COMMERCIAL

## 2024-05-08 ENCOUNTER — NURSING HOME VISIT (OUTPATIENT)
Dept: POST ACUTE CARE | Facility: EXTERNAL LOCATION | Age: 59
End: 2024-05-08
Payer: COMMERCIAL

## 2024-05-08 DIAGNOSIS — E78.2 MIXED HYPERLIPIDEMIA: ICD-10-CM

## 2024-05-08 DIAGNOSIS — Z79.4 TYPE 2 DIABETES MELLITUS WITH RIGHT EYE AFFECTED BY PROLIFERATIVE RETINOPATHY AND MACULAR EDEMA, WITH LONG-TERM CURRENT USE OF INSULIN (MULTI): ICD-10-CM

## 2024-05-08 DIAGNOSIS — I10 BENIGN ESSENTIAL HYPERTENSION: ICD-10-CM

## 2024-05-08 DIAGNOSIS — S72.8X2A OTHER CLOSED FRACTURE OF LEFT FEMUR, UNSPECIFIED PORTION OF FEMUR, INITIAL ENCOUNTER (MULTI): Primary | ICD-10-CM

## 2024-05-08 DIAGNOSIS — E11.3511 TYPE 2 DIABETES MELLITUS WITH RIGHT EYE AFFECTED BY PROLIFERATIVE RETINOPATHY AND MACULAR EDEMA, WITH LONG-TERM CURRENT USE OF INSULIN (MULTI): ICD-10-CM

## 2024-05-08 PROCEDURE — 99308 SBSQ NF CARE LOW MDM 20: CPT | Performed by: INTERNAL MEDICINE

## 2024-05-08 NOTE — LETTER
Patient: Nuris Squires  : 1965    Encounter Date: 2024     Subjective- resident seen for follow up.She is sitting in her room. No health concerns.  ROS- Gen- no fever  weakness +  Resp- no cough/SOB  Chest- no pain  Abdomen- no diarrhea  No abd pain  Ext- pain left leg, knee  Psych- calm  PE VS BP- 149/73T 97.4 P73 Wt 225 lbs  Gen- alert, sitting in room  Neck- supple, no mass  Respiratory- CTA B/L  CVS- RRR S1S2  Abdomen- soft, non tender  Ext- left knee staples+  no pedal edema B/L  Neuro-appears normal, No gross motor deficits  Assessment/Plan-  Left femur fracture-  s/p Open Reduction Internal Fixation Femur (Left: Knee)  Insertion Intramedullary Nail Femur  c/w tramadol 50 mg q 6-8 hrs for pain  Follows Orthopedics on  for staple removal  HTN- c/w amlodipine, clonidine  HLD- c/w lipitpr  Perineal itching- started on diflucan  c/w PT/OT      Electronically Signed By: Navya Travis MD   24  8:18 PM

## 2024-05-09 ENCOUNTER — TELEPHONE (OUTPATIENT)
Dept: ORTHOPEDIC SURGERY | Facility: HOSPITAL | Age: 59
End: 2024-05-09
Payer: COMMERCIAL

## 2024-05-09 NOTE — TELEPHONE ENCOUNTER
Copied from CRM #[id number]. Topic: Information Request - Doctor, Hospital, or Provider  >> May 9, 2024  1:09 PM Kathy PENALOZA wrote:  PT needs to reschedule post op visit, pt transportation at facility didn't show up

## 2024-05-09 NOTE — PROGRESS NOTES
Subjective- resident seen for follow up.She is sitting in her room. No health concerns.  ROS- Gen- no fever  weakness +  Resp- no cough/SOB  Chest- no pain  Abdomen- no diarrhea  No abd pain  Ext- pain left leg, knee  Psych- calm  PE VS BP- 149/73T 97.4 P73 Wt 225 lbs  Gen- alert, sitting in room  Neck- supple, no mass  Respiratory- CTA B/L  CVS- RRR S1S2  Abdomen- soft, non tender  Ext- left knee staples+  no pedal edema B/L  Neuro-appears normal, No gross motor deficits  Assessment/Plan-  Left femur fracture-  s/p Open Reduction Internal Fixation Femur (Left: Knee)  Insertion Intramedullary Nail Femur  c/w tramadol 50 mg q 6-8 hrs for pain  Follows Orthopedics on 5/9 for staple removal  HTN- c/w amlodipine, clonidine  HLD- c/w lipitpr  Perineal itching- started on diflucan  c/w PT/OT

## 2024-05-13 ENCOUNTER — NURSING HOME VISIT (OUTPATIENT)
Dept: POST ACUTE CARE | Facility: EXTERNAL LOCATION | Age: 59
End: 2024-05-13
Payer: COMMERCIAL

## 2024-05-13 DIAGNOSIS — E78.2 MIXED HYPERLIPIDEMIA: ICD-10-CM

## 2024-05-13 DIAGNOSIS — E11.3511 TYPE 2 DIABETES MELLITUS WITH RIGHT EYE AFFECTED BY PROLIFERATIVE RETINOPATHY AND MACULAR EDEMA, WITH LONG-TERM CURRENT USE OF INSULIN (MULTI): ICD-10-CM

## 2024-05-13 DIAGNOSIS — E55.9 VITAMIN D DEFICIENCY: ICD-10-CM

## 2024-05-13 DIAGNOSIS — R42 VERTIGO: ICD-10-CM

## 2024-05-13 DIAGNOSIS — R52 PAIN: ICD-10-CM

## 2024-05-13 DIAGNOSIS — Z79.4 TYPE 2 DIABETES MELLITUS WITH RIGHT EYE AFFECTED BY PROLIFERATIVE RETINOPATHY AND MACULAR EDEMA, WITH LONG-TERM CURRENT USE OF INSULIN (MULTI): ICD-10-CM

## 2024-05-13 DIAGNOSIS — E66.01 MORBID OBESITY WITH BODY MASS INDEX (BMI) OF 40.0 OR HIGHER (MULTI): ICD-10-CM

## 2024-05-13 DIAGNOSIS — K59.09 CHRONIC CONSTIPATION: ICD-10-CM

## 2024-05-13 DIAGNOSIS — I10 BENIGN ESSENTIAL HYPERTENSION: ICD-10-CM

## 2024-05-13 DIAGNOSIS — L50.9 HIVES: ICD-10-CM

## 2024-05-13 DIAGNOSIS — S72.8X2A OTHER CLOSED FRACTURE OF LEFT FEMUR, UNSPECIFIED PORTION OF FEMUR, INITIAL ENCOUNTER (MULTI): ICD-10-CM

## 2024-05-13 DIAGNOSIS — R26.81 UNSTEADY GAIT: Primary | ICD-10-CM

## 2024-05-13 PROCEDURE — 99309 SBSQ NF CARE MODERATE MDM 30: CPT | Performed by: FAMILY MEDICINE

## 2024-05-13 NOTE — LETTER
"Patient: Nuris Squires  : 1965    Encounter Date: 2024    Chief Complaint  Routine visit  Subjective  HPI:  Ms. Nuris Squires is a pleasant 57 yo female who has been admitted for PT/Isreal regain mobility and strength following a fall at home with left femur fracture s/p surgery on . She states she was at home in her kitchen when she fell on her left knee, denies LOC at time of event. She has a PMH of falls, DM, HTN, IDDM, CKD, onprn oxygen at home 2 L NC. During her hospital stay x-ray showed comminuted and moderately displaced intra-articular fracture  of the left distal femur. She is to be NWB and follow up with ortho in May. Lives at home with her boyfriend.  ---: patientresting in wheelchair getting ready to work with PT this am. She denies pain at this time and states it has been well controlled with Tylenol. Her left leg has surgical incision well approximated and closed with staples without redness, bleeding or bruising. Patient is eating an drinking well, she has no c/o of nausea, vomiting, diarrhea or constipation. She denies fevers, chill, chest pain, dizziness or swelling.  ---: patient alert, awake no acute distress. resting in bed upright. Informed by nursing this am \"Resident had complaints of itching to skin all over her body at approximately 0430. Resident scratching abdomen and arms with nails which resulted in multiple small hives appearing on Residents abdomen and right flank' , Benadryl ordered and given and itching/rash has resolved. patient denies any pain, is continuing to working with PT/OT to regain strength. She missed her appt with surgeon r/t transportation issue and will need to be reschedule appt. Patient states her appetite is good and she is drinking and eating well. no c/o chest pain, nausea, difficulty moving bowels. Patient has several knee surgery staples present that will need to be evaluated by surgeon before removal. Incision is clean, dry well approx. " Today's plan to remove groin area staples r/t skin becoming irritated surrounding skin becoming erythematous.    Objective  VS: 103/49, 97.2, 70, 0/10, 16, 94% on RA, 225#     Physical exam:  Physical Exam  Constitutional:  General: She is awake. She is not in acute distress.  Appearance: Normal appearance. She is obese. She is not toxic-appearing.  HENT:  Head: Normocephalic and atraumatic.  Mouth/Throat:  Mouth: Mucous membranes are moist.  Cardiovascular:  Rate and Rhythm: Normal rate and regular rhythm.  Pulmonary:  Effort:Pulmonary effort is normal.no crackles or wheezes.  Abdominal:  General: Abdomen is protruding. Patient is overweight  Palpations: Abdomen is round , soft.  Tenderness: There is no abdominal tenderness.  Musculoskeletal:  General: Normal range of motion.weight bear as tolerate with walker, and weakness in LLE  Skin: incision on left knee healing well, intact, dry , staples intact, well approx.  General: Skin is warm and dry. Left knee incision closed with multiple staples and 4 other small incisions with 1-2 staple sites next to incision.  Neurological:  General: No focal deficit present.  Mental Status: She is alert and oriented x 3  Psychiatric:  Attention and Perception: Attention and perception normal.  Mood and Affect: Moodand affect normal.  Speech: Speech normal.  Behavior: Behavior normal. Behavior is cooperative.  Cognition and Memory: alert and oriented x3, Cognition and memory normal.    Assessment/Plan    Unsteady gait  c/wPT/OT for mobility, gait training, endurance, safety awareness, ADLs, and assessment of post-rehab needs.    Other closed fracture of left femur, unspecified portion of femur, initial encounter (Multi)  S/p insertion IM lonny.  C/w PT/OT, follow up with ortho, is stable. Staples to left knee intact.  Groin staples removed,.  Slightly erythematous.      Pain  c/w Tylenol, gabapentin and tramadol.  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

## 2024-05-15 ENCOUNTER — NURSING HOME VISIT (OUTPATIENT)
Dept: POST ACUTE CARE | Facility: EXTERNAL LOCATION | Age: 59
End: 2024-05-15
Payer: COMMERCIAL

## 2024-05-15 DIAGNOSIS — S72.8X2A OTHER CLOSED FRACTURE OF LEFT FEMUR, UNSPECIFIED PORTION OF FEMUR, INITIAL ENCOUNTER (MULTI): ICD-10-CM

## 2024-05-15 DIAGNOSIS — Z79.4 TYPE 2 DIABETES MELLITUS WITH RIGHT EYE AFFECTED BY PROLIFERATIVE RETINOPATHY AND MACULAR EDEMA, WITH LONG-TERM CURRENT USE OF INSULIN (MULTI): ICD-10-CM

## 2024-05-15 DIAGNOSIS — I10 BENIGN ESSENTIAL HYPERTENSION: Primary | ICD-10-CM

## 2024-05-15 DIAGNOSIS — E11.3511 TYPE 2 DIABETES MELLITUS WITH RIGHT EYE AFFECTED BY PROLIFERATIVE RETINOPATHY AND MACULAR EDEMA, WITH LONG-TERM CURRENT USE OF INSULIN (MULTI): ICD-10-CM

## 2024-05-15 DIAGNOSIS — E78.2 MIXED HYPERLIPIDEMIA: ICD-10-CM

## 2024-05-15 PROCEDURE — 99308 SBSQ NF CARE LOW MDM 20: CPT | Performed by: INTERNAL MEDICINE

## 2024-05-15 NOTE — LETTER
Patient: Nuris Squires  : 1965    Encounter Date: 05/15/2024     Subjective- resident seen for follow up.She is sitting in her room. No health concerns.  ROS- Gen- no fever  weakness +  Resp- no cough/SOB  Chest- no pain  Abdomen- no diarrhea  No abd pain  Ext- pain left leg, knee  Psych- calm  PE VS BP- 130/67 T 97.1 P 730 Wt 217 lbs  Gen- alert, sitting in room  Neck- supple, no mass  Respiratory- CTA B/L  CVS- RRR S1S2  Abdomen- soft, non tender  Ext- left knee staples+  no pedal edema B/L  Neuro-appears normal, No gross motor deficits  Assessment/Plan-  Left femur fracture-  s/p Open Reduction Internal Fixation Femur (Left: Knee)  Insertion Intramedullary Nail Femur  Pain improving  D/C tramadol and c/w tylenol prn  Staff to call Orthopedics for staple removal  HTN- c/w amlodipine, clonidine  HLD- c/w lipitor  c/w PT/OT      Electronically Signed By: Navya Travis MD   24  9:32 AM

## 2024-05-16 NOTE — PROGRESS NOTES
Subjective- resident seen for follow up.She is sitting in her room. No health concerns.  ROS- Gen- no fever  weakness +  Resp- no cough/SOB  Chest- no pain  Abdomen- no diarrhea  No abd pain  Ext- pain left leg, knee  Psych- calm  PE VS BP- 130/67 T 97.1 P 730 Wt 217 lbs  Gen- alert, sitting in room  Neck- supple, no mass  Respiratory- CTA B/L  CVS- RRR S1S2  Abdomen- soft, non tender  Ext- left knee staples+  no pedal edema B/L  Neuro-appears normal, No gross motor deficits  Assessment/Plan-  Left femur fracture-  s/p Open Reduction Internal Fixation Femur (Left: Knee)  Insertion Intramedullary Nail Femur  Pain improving  D/C tramadol and c/w tylenol prn  Staff to call Orthopedics for staple removal  HTN- c/w amlodipine, clonidine  HLD- c/w lipitor  c/w PT/OT

## 2024-05-16 NOTE — PROGRESS NOTES
"Chief Complaint  Routine visit  Subjective  HPI:  Ms. Nuris Squires is a pleasant 57 yo female who has been admitted for PT/Isreal regain mobility and strength following a fall at home with left femur fracture s/p surgery on 4/20. She states she was at home in her kitchen when she fell on her left knee, denies LOC at time of event. She has a PMH of falls, DM, HTN, IDDM, CKD, onprn oxygen at home 2 L NC. During her hospital stay x-ray showed comminuted and moderately displaced intra-articular fracture  of the left distal femur. She is to be NWB and follow up with ortho in May. Lives at home with her boyfriend.  ---4/29: patientresting in wheelchair getting ready to work with PT this am. She denies pain at this time and states it has been well controlled with Tylenol. Her left leg has surgical incision well approximated and closed with staples without redness, bleeding or bruising. Patient is eating an drinking well, she has no c/o of nausea, vomiting, diarrhea or constipation. She denies fevers, chill, chest pain, dizziness or swelling.  ---05/13: patient alert, awake no acute distress. resting in bed upright. Informed by nursing this am \"Resident had complaints of itching to skin all over her body at approximately 0430. Resident scratching abdomen and arms with nails which resulted in multiple small hives appearing on Residents abdomen and right flank' , Benadryl ordered and given and itching/rash has resolved. patient denies any pain, is continuing to working with PT/OT to regain strength. She missed her appt with surgeon r/t transportation issue and will need to be reschedule appt. Patient states her appetite is good and she is drinking and eating well. no c/o chest pain, nausea, difficulty moving bowels. Patient has several knee surgery staples present that will need to be evaluated by surgeon before removal. Incision is clean, dry well approx. Today's plan to remove groin area staples r/t skin becoming irritated " surrounding skin becoming erythematous.    Objective  VS: 103/49, 97.2, 70, 0/10, 16, 94% on RA, 225#     Physical exam:  Physical Exam  Constitutional:  General: She is awake. She is not in acute distress.  Appearance: Normal appearance. She is obese. She is not toxic-appearing.  HENT:  Head: Normocephalic and atraumatic.  Mouth/Throat:  Mouth: Mucous membranes are moist.  Cardiovascular:  Rate and Rhythm: Normal rate and regular rhythm.  Pulmonary:  Effort:Pulmonary effort is normal.no crackles or wheezes.  Abdominal:  General: Abdomen is protruding. Patient is overweight  Palpations: Abdomen is round , soft.  Tenderness: There is no abdominal tenderness.  Musculoskeletal:  General: Normal range of motion.weight bear as tolerate with walker, and weakness in LLE  Skin: incision on left knee healing well, intact, dry , staples intact, well approx.  General: Skin is warm and dry. Left knee incision closed with multiple staples and 4 other small incisions with 1-2 staple sites next to incision.  Neurological:  General: No focal deficit present.  Mental Status: She is alert and oriented x 3  Psychiatric:  Attention and Perception: Attention and perception normal.  Mood and Affect: Moodand affect normal.  Speech: Speech normal.  Behavior: Behavior normal. Behavior is cooperative.  Cognition and Memory: alert and oriented x3, Cognition and memory normal.    Assessment/Plan    Unsteady gait  c/wPT/OT for mobility, gait training, endurance, safety awareness, ADLs, and assessment of post-rehab needs.    Other closed fracture of left femur, unspecified portion of femur, initial encounter (Multi)  S/p insertion IM lonny.  C/w PT/OT, follow up with ortho, is stable. Staples to left knee intact.  Groin staples removed,.  Slightly erythematous.      Pain  c/w Tylenol, gabapentin and tramadol.  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

## 2024-05-17 PROBLEM — L50.9 HIVES: Status: ACTIVE | Noted: 2024-05-17

## 2024-05-17 NOTE — ASSESSMENT & PLAN NOTE
>>ASSESSMENT AND PLAN FOR BENIGN ESSENTIAL HYPERTENSION WRITTEN ON 5/17/2024  4:10 PM BY TAL BUTLER-CNP    c/w amlodipine and clonidine  -120s

## 2024-05-17 NOTE — ASSESSMENT & PLAN NOTE
>>ASSESSMENT AND PLAN FOR OTHER CLOSED FRACTURE OF LEFT FEMUR, UNSPECIFIED PORTION OF FEMUR, INITIAL ENCOUNTER (MULTI) WRITTEN ON 5/17/2024  4:07 PM BY TAL BUTLER-CNP    S/p insertion IM lonny.  C/w PT/OT, follow up with ortho, is stable. Staples to left knee intact.  Groin staples removed,.  Slightly erythematous.

## 2024-05-17 NOTE — ASSESSMENT & PLAN NOTE
S/p insertion IM lonny.  C/w PT/OT, follow up with ortho, is stable. Staples to left knee intact.  Groin staples removed,.  Slightly erythematous.

## 2024-05-20 ENCOUNTER — NURSING HOME VISIT (OUTPATIENT)
Dept: POST ACUTE CARE | Facility: EXTERNAL LOCATION | Age: 59
End: 2024-05-20
Payer: COMMERCIAL

## 2024-05-20 DIAGNOSIS — Z79.4 TYPE 2 DIABETES MELLITUS WITH RIGHT EYE AFFECTED BY PROLIFERATIVE RETINOPATHY AND MACULAR EDEMA, WITH LONG-TERM CURRENT USE OF INSULIN (MULTI): ICD-10-CM

## 2024-05-20 DIAGNOSIS — E11.3511 TYPE 2 DIABETES MELLITUS WITH RIGHT EYE AFFECTED BY PROLIFERATIVE RETINOPATHY AND MACULAR EDEMA, WITH LONG-TERM CURRENT USE OF INSULIN (MULTI): ICD-10-CM

## 2024-05-20 DIAGNOSIS — K59.09 CHRONIC CONSTIPATION: ICD-10-CM

## 2024-05-20 DIAGNOSIS — R52 PAIN: ICD-10-CM

## 2024-05-20 DIAGNOSIS — R42 VERTIGO: ICD-10-CM

## 2024-05-20 DIAGNOSIS — S72.92XS CLOSED FRACTURE OF LEFT FEMUR, UNSPECIFIED FRACTURE MORPHOLOGY, UNSPECIFIED PORTION OF FEMUR, SEQUELA: ICD-10-CM

## 2024-05-20 DIAGNOSIS — I10 BENIGN ESSENTIAL HYPERTENSION: ICD-10-CM

## 2024-05-20 DIAGNOSIS — E78.2 MIXED HYPERLIPIDEMIA: ICD-10-CM

## 2024-05-20 DIAGNOSIS — E55.9 VITAMIN D DEFICIENCY: ICD-10-CM

## 2024-05-20 DIAGNOSIS — D64.9 ANEMIA, UNSPECIFIED TYPE: ICD-10-CM

## 2024-05-20 DIAGNOSIS — L50.9 HIVES: ICD-10-CM

## 2024-05-20 DIAGNOSIS — R26.81 UNSTEADY GAIT: Primary | ICD-10-CM

## 2024-05-20 DIAGNOSIS — E66.01 MORBID OBESITY WITH BODY MASS INDEX (BMI) OF 40.0 OR HIGHER (MULTI): ICD-10-CM

## 2024-05-20 PROCEDURE — 99309 SBSQ NF CARE MODERATE MDM 30: CPT | Performed by: FAMILY MEDICINE

## 2024-05-20 NOTE — LETTER
"Patient: Nuris Squires  : 1965    Encounter Date: 2024    Chief Complaint  Lab review  Subjective  HPI:  59 yo female who has been admitted for PT/Isreal regain mobility and strength following a fall at home with left femur fracture s/p surgery on . She states she was at home in her kitchen when she fell on her left knee, denies LOC at time of event. She has a PMH of falls, DM, HTN, IDDM, CKD, onprn oxygen at home 2 L NC. During her hospital stay x-ray showed comminuted and moderately displaced intra-articular fracture  of the left distal femur. She is to be NWB and follow up with ortho in May. Lives at home with her boyfriend.  ---: patientresting in wheelchair getting ready to work with PT this am. She denies pain at this time and states it has been well controlled with Tylenol. Her left leg has surgical incision well approximated and closed with staples without redness, bleeding or bruising. Patient is eating an drinking well, she has no c/o of nausea, vomiting, diarrhea or constipation. She denies fevers, chill, chest pain, dizziness or swelling.  ---: patient alert, awake no acute distress. resting in bed upright. Informed by nursing this am \"Resident had complaints of itching to skin all over her body at approximately 0430. Resident scratching abdomen and arms with nails which resulted in multiple small hives appearing on Residents abdomen and right flank' , Benadryl ordered and given and itching/rash has resolved. patient denies any pain, is continuing to working with PT/OT to regain strength. She missed her appt with surgeon r/t transportation issue and will need to be reschedule appt. Patient states her appetite is good and she is drinking and eating well. no c/o chest pain, nausea, difficulty moving bowels. Patient has several knee surgery staples present that will need to be evaluated by surgeon before removal. Incision is clean, dry well approx. Today's plan to remove groin area " staples r/t skin becoming irritated surrounding skin becoming erythematous.  ---5/20  Patient sitting in chair, she denies pain or discomfort. C/o left groin staples causing skin irritation.  Appetite good.  Denies HA, dizziness, SOB, CP, N&V or constipation    Review of Systems:  Reviewed chart looking at current medications, treatment, labs and x-rays and note pertinent positives or negatives, otherwise 10 point system review negative except for what is noted in HPI.    Objective  VS: 122/59, 97.0, 65, 18, 97%% on RA, 225#     Physical exam:  Physical Exam  Constitutional:  General: She is awake. She is not in acute distress.  Appearance: Normal appearance. She is obese. She is not toxic-appearing.  HENT:  Head: Normocephalic and atraumatic.  Mouth/Throat:  Mouth: Mucous membranes are moist.  Cardiovascular:  Rate and Rhythm: Normal rate and regular rhythm.  Pulmonary:  Effort:Pulmonary effort is normal.no crackles or wheezes.  Abdominal:  General: Abdomen is protruding. Patient is overweight  Palpations: Abdomen is round , soft.  Tenderness: There is no abdominal tenderness.  Musculoskeletal:  General: Normal range of motion.weight bear as tolerate with walker, and weakness in LLE  Skin: incision on left knee healing well, intact, dry , staples intact, well approx.  General: Skin is warm and dry. Left knee incision closed with multiple staples and 4 other small incisions with 1-2 staple sites next to incision. Dry and mainly healed.  Left groin staples removed, healed site erythematous.     Neurological:  General: No focal deficit present.  Mental Status: She is alert and oriented x 3  Psychiatric:  Attention and Perception: Attention and perception normal.  Mood and Affect: Moodand affect normal.  Speech: Speech normal.  Behavior: Behavior normal. Behavior is cooperative.  Cognition and Memory: alert and oriented x3, Cognition and memory normal.    Assessment/Plan  59 yo female who has been admitted for PT/[place]  regain mobility and strength following a fall at home with left femur fracture s/p surgery on 4/20.    Unsteady gait  c/w PT/OT for mobility, gait training, endurance, safety awareness, ADLs, and assessment of post-rehab needs.    Closed fracture of left femur (Multi)  S/p insertion IM lonny.  C/w PT/OT, follow up with ortho, is stable. Staples to left knee intact.  Groin staples removed,.  Slightly erythematous.      Pain  c/w Tylenol, gabapentin and prn tramadol.  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  Is not on iron. Will start daily 325mg.      Has appt with Dr. Orr  (ortho) on 5/22 will f/u.      Electronically Signed By: TAL Mclain-CNP   5/22/24 10:26 AM

## 2024-05-22 ENCOUNTER — APPOINTMENT (OUTPATIENT)
Dept: RADIOLOGY | Facility: CLINIC | Age: 59
End: 2024-05-22
Payer: COMMERCIAL

## 2024-05-22 ENCOUNTER — NURSING HOME VISIT (OUTPATIENT)
Dept: POST ACUTE CARE | Facility: EXTERNAL LOCATION | Age: 59
End: 2024-05-22

## 2024-05-22 ENCOUNTER — APPOINTMENT (OUTPATIENT)
Dept: ORTHOPEDIC SURGERY | Facility: CLINIC | Age: 59
End: 2024-05-22
Payer: COMMERCIAL

## 2024-05-22 DIAGNOSIS — Z79.4 TYPE 2 DIABETES MELLITUS WITH RIGHT EYE AFFECTED BY PROLIFERATIVE RETINOPATHY AND MACULAR EDEMA, WITH LONG-TERM CURRENT USE OF INSULIN (MULTI): ICD-10-CM

## 2024-05-22 DIAGNOSIS — E78.2 MIXED HYPERLIPIDEMIA: ICD-10-CM

## 2024-05-22 DIAGNOSIS — S72.92XS CLOSED FRACTURE OF LEFT FEMUR, UNSPECIFIED FRACTURE MORPHOLOGY, UNSPECIFIED PORTION OF FEMUR, SEQUELA: Primary | ICD-10-CM

## 2024-05-22 DIAGNOSIS — I10 BENIGN ESSENTIAL HYPERTENSION: ICD-10-CM

## 2024-05-22 DIAGNOSIS — E11.3511 TYPE 2 DIABETES MELLITUS WITH RIGHT EYE AFFECTED BY PROLIFERATIVE RETINOPATHY AND MACULAR EDEMA, WITH LONG-TERM CURRENT USE OF INSULIN (MULTI): ICD-10-CM

## 2024-05-22 PROCEDURE — 99308 SBSQ NF CARE LOW MDM 20: CPT | Performed by: INTERNAL MEDICINE

## 2024-05-22 NOTE — ASSESSMENT & PLAN NOTE
c/w PT/OT for mobility, gait training, endurance, safety awareness, ADLs, and assessment of post-rehab needs.

## 2024-05-22 NOTE — PROGRESS NOTES
"Chief Complaint  Lab review  Subjective  HPI:  57 yo female who has been admitted for PT/Isreal regain mobility and strength following a fall at home with left femur fracture s/p surgery on 4/20. She states she was at home in her kitchen when she fell on her left knee, denies LOC at time of event. She has a PMH of falls, DM, HTN, IDDM, CKD, onprn oxygen at home 2 L NC. During her hospital stay x-ray showed comminuted and moderately displaced intra-articular fracture  of the left distal femur. She is to be NWB and follow up with ortho in May. Lives at home with her boyfriend.  ---4/29: patientresting in wheelchair getting ready to work with PT this am. She denies pain at this time and states it has been well controlled with Tylenol. Her left leg has surgical incision well approximated and closed with staples without redness, bleeding or bruising. Patient is eating an drinking well, she has no c/o of nausea, vomiting, diarrhea or constipation. She denies fevers, chill, chest pain, dizziness or swelling.  ---05/13: patient alert, awake no acute distress. resting in bed upright. Informed by nursing this am \"Resident had complaints of itching to skin all over her body at approximately 0430. Resident scratching abdomen and arms with nails which resulted in multiple small hives appearing on Residents abdomen and right flank' , Benadryl ordered and given and itching/rash has resolved. patient denies any pain, is continuing to working with PT/OT to regain strength. She missed her appt with surgeon r/t transportation issue and will need to be reschedule appt. Patient states her appetite is good and she is drinking and eating well. no c/o chest pain, nausea, difficulty moving bowels. Patient has several knee surgery staples present that will need to be evaluated by surgeon before removal. Incision is clean, dry well approx. Today's plan to remove groin area staples r/t skin becoming irritated surrounding skin becoming " erythematous.  ---5/20  Patient sitting in chair, she denies pain or discomfort. C/o left groin staples causing skin irritation.  Appetite good.  Denies HA, dizziness, SOB, CP, N&V or constipation    Review of Systems:  Reviewed chart looking at current medications, treatment, labs and x-rays and note pertinent positives or negatives, otherwise 10 point system review negative except for what is noted in HPI.    Objective  VS: 122/59, 97.0, 65, 18, 97%% on RA, 225#     Physical exam:  Physical Exam  Constitutional:  General: She is awake. She is not in acute distress.  Appearance: Normal appearance. She is obese. She is not toxic-appearing.  HENT:  Head: Normocephalic and atraumatic.  Mouth/Throat:  Mouth: Mucous membranes are moist.  Cardiovascular:  Rate and Rhythm: Normal rate and regular rhythm.  Pulmonary:  Effort:Pulmonary effort is normal.no crackles or wheezes.  Abdominal:  General: Abdomen is protruding. Patient is overweight  Palpations: Abdomen is round , soft.  Tenderness: There is no abdominal tenderness.  Musculoskeletal:  General: Normal range of motion.weight bear as tolerate with walker, and weakness in LLE  Skin: incision on left knee healing well, intact, dry , staples intact, well approx.  General: Skin is warm and dry. Left knee incision closed with multiple staples and 4 other small incisions with 1-2 staple sites next to incision. Dry and mainly healed.  Left groin staples removed, healed site erythematous.     Neurological:  General: No focal deficit present.  Mental Status: She is alert and oriented x 3  Psychiatric:  Attention and Perception: Attention and perception normal.  Mood and Affect: Moodand affect normal.  Speech: Speech normal.  Behavior: Behavior normal. Behavior is cooperative.  Cognition and Memory: alert and oriented x3, Cognition and memory normal.    Assessment/Plan  59 yo female who has been admitted for PT/Isreal regain mobility and strength following a fall at home with  left femur fracture s/p surgery on 4/20.    Unsteady gait  c/w PT/OT for mobility, gait training, endurance, safety awareness, ADLs, and assessment of post-rehab needs.    Closed fracture of left femur (Multi)  S/p insertion IM lonny.  C/w PT/OT, follow up with ortho, is stable. Staples to left knee intact.  Groin staples removed,.  Slightly erythematous.      Pain  c/w Tylenol, gabapentin and prn tramadol.  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  Is not on iron. Will start daily 325mg.      Has appt with Dr. Orr  (ortho) on 5/22 will f/u.

## 2024-05-22 NOTE — LETTER
Patient: Nuris Squires  : 1965    Encounter Date: 2024     Subjective- resident seen for follow up.She is sitting in her room. No health concerns.  ROS- Gen- no fever  weakness +  Resp- no cough/SOB  Chest- no pain  Abdomen- no diarrhea  No abd pain  Ext- pain left leg, knee  Psych- calm  PE VS BP- 114/51 T 96.8 P 76 Wt 217 lbs  Gen- alert, sitting in room  Neck- supple, no mass  Respiratory- CTA B/L  CVS- RRR S1S2  Abdomen- soft, non tender  Ext- left knee staples+  no pedal edema B/L  Neuro-appears normal, No gross motor deficits  Assessment/Plan-  Left femurfracture-  s/p Open Reduction Internal Fixation Femur (Left: Knee)  Insertion Intramedullary Nail Femur  Pain improving  D/C tramadol and c/w tylenol prn  staple removal this week  HTN- c/w amlodipine, clonidine  HLD- c/w lipitor  c/w PT/OT  Possible d/chome this week      Electronically Signed By: Navya Travis MD   24  6:12 PM

## 2024-05-22 NOTE — ASSESSMENT & PLAN NOTE
>>ASSESSMENT AND PLAN FOR BENIGN ESSENTIAL HYPERTENSION WRITTEN ON 5/22/2024  9:55 AM BY TAL BUTLER-CNP    c/w amlodipine and clonidine  -120s

## 2024-05-23 ENCOUNTER — HOME HEALTH ADMISSION (OUTPATIENT)
Dept: HOME HEALTH SERVICES | Facility: HOME HEALTH | Age: 59
End: 2024-05-23
Payer: COMMERCIAL

## 2024-05-23 ENCOUNTER — DOCUMENTATION (OUTPATIENT)
Dept: HOME HEALTH SERVICES | Facility: HOME HEALTH | Age: 59
End: 2024-05-23
Payer: COMMERCIAL

## 2024-05-23 ENCOUNTER — NURSING HOME VISIT (OUTPATIENT)
Dept: POST ACUTE CARE | Facility: EXTERNAL LOCATION | Age: 59
End: 2024-05-23
Payer: COMMERCIAL

## 2024-05-23 DIAGNOSIS — D64.9 ANEMIA, UNSPECIFIED TYPE: ICD-10-CM

## 2024-05-23 DIAGNOSIS — E55.9 VITAMIN D DEFICIENCY: ICD-10-CM

## 2024-05-23 DIAGNOSIS — E78.2 MIXED HYPERLIPIDEMIA: ICD-10-CM

## 2024-05-23 DIAGNOSIS — R26.81 UNSTEADY GAIT: Primary | ICD-10-CM

## 2024-05-23 DIAGNOSIS — Z79.4 TYPE 2 DIABETES MELLITUS WITH RIGHT EYE AFFECTED BY PROLIFERATIVE RETINOPATHY AND MACULAR EDEMA, WITH LONG-TERM CURRENT USE OF INSULIN (MULTI): ICD-10-CM

## 2024-05-23 DIAGNOSIS — R42 VERTIGO: ICD-10-CM

## 2024-05-23 DIAGNOSIS — L50.9 HIVES: ICD-10-CM

## 2024-05-23 DIAGNOSIS — K59.09 CHRONIC CONSTIPATION: ICD-10-CM

## 2024-05-23 DIAGNOSIS — E66.01 MORBID OBESITY WITH BODY MASS INDEX (BMI) OF 40.0 OR HIGHER (MULTI): ICD-10-CM

## 2024-05-23 DIAGNOSIS — R52 PAIN: ICD-10-CM

## 2024-05-23 DIAGNOSIS — S72.92XA CLOSED FRACTURE OF LEFT FEMUR, UNSPECIFIED FRACTURE MORPHOLOGY, UNSPECIFIED PORTION OF FEMUR, INITIAL ENCOUNTER (MULTI): ICD-10-CM

## 2024-05-23 DIAGNOSIS — I10 BENIGN ESSENTIAL HYPERTENSION: ICD-10-CM

## 2024-05-23 DIAGNOSIS — E11.3511 TYPE 2 DIABETES MELLITUS WITH RIGHT EYE AFFECTED BY PROLIFERATIVE RETINOPATHY AND MACULAR EDEMA, WITH LONG-TERM CURRENT USE OF INSULIN (MULTI): ICD-10-CM

## 2024-05-23 PROCEDURE — 99316 NF DSCHRG MGMT 30 MIN+: CPT | Performed by: FAMILY MEDICINE

## 2024-05-23 NOTE — HH CARE COORDINATION
Home Care received a Referral for Physical Therapy and Occupational Therapy. We have processed the referral for a Start of Care on 24-48 HOURS POST DC OF 5/24/2024.     If you have any questions or concerns, please feel free to contact us at [phone]. Follow the prompts, enter your five digit zip code, and you will be directed to your care team on [facility].

## 2024-05-23 NOTE — LETTER
"Patient: Nuris Squires  : 1965    Encounter Date: 2024    Chief Complaint  Discharge  Subjective  HPI:  59 yo female who has been admitted for PT/[place] regain mobility and strength following a fall at home with left femur fracture s/p surgery on . She states she was at home in her kitchen when she fell on her left knee, denies LOC at time of event. She has a PMH of falls, DM, HTN, IDDM, CKD, onprn oxygen at home 2 L NC. During her hospital stay x-ray showed comminuted and moderately displaced intra-articular fracture  of the left distal femur. She is to be NWB and follow up with ortho in May. Lives at home with her boyfriend.  ---: patientresting in wheelchair getting ready to work with PT this am. She denies pain at this time and states it has been well controlled with Tylenol. Her left leg has surgical incision well approximated and closed with staples without redness, bleeding or bruising. Patient is eating an drinking well, she has no c/o of nausea, vomiting, diarrhea or constipation. She denies fevers, chill, chest pain, dizziness or swelling.  ---: patient alert, awake no acute distress. resting in bed upright. Informed by nursing this am \"Resident had complaints of itching to skin all over her body at approximately 0430. Resident scratching abdomen and arms with nails which resulted in multiple small hives appearing on Residents abdomen and right flank' , Benadryl ordered and given and itching/rash has resolved. patient denies any pain, is continuing to working with PT/OT to regain strength. She missed her appt with surgeon r/t transportation issue and will need to be reschedule appt. Patient states her appetite is good and she is drinking and eating well. no c/o chest pain, nausea, difficulty moving bowels. Patient has several knee surgery staples present that will need to be evaluated by surgeon before removal. Incision is clean, dry well approx. Today's plan to remove groin area " staples r/t skin becoming irritated surrounding skin becoming erythematous.  ---5/20  Patient sitting in chair, she denies pain or discomfort. C/o left groin staples causing skin irritation.  Appetite good.  Denies HA, dizziness, SOB, CP, N&V or constipation  ---5/23  Patient sitting in chair, she did not follow up with ortho due to transportation not being available. Appetite good. Denies pain or discomfort.  Denies HA, dizziness, SOB, CP, N&V or constipation     Review of Systems:  Reviewed chart looking at current medications, treatment, labs and x-rays and note pertinent positives or negatives, otherwise 10 point system review negative except for what is noted in HPI.    Objective  VS: 122/59, 97.0, 65, 18, 97%% on RA, 225#     Physical exam:  Physical Exam  Constitutional:  General: She is awake. She is not in acute distress.  Appearance: Normal appearance. She is obese. She is not toxic-appearing.  HENT:  Head: Normocephalic and atraumatic.  Mouth/Throat:  Mouth: Mucous membranes are moist.  Cardiovascular:  Rate and Rhythm: Normal rate and regular rhythm.  Pulmonary:  Effort:Pulmonary effort is normal.no crackles or wheezes.  Abdominal:  General: Abdomen is protruding. Patient is overweight  Palpations: Abdomen is round , soft.  Tenderness: There is no abdominal tenderness.  Musculoskeletal:  General: Normal range of motion.weight bear as tolerate with walker, and weakness in LLE  Skin: incision on left knee healing well, intact, dry , staples intact, well approx.  General: Skin is warm and dry. Left knee incision closed with multiple staples and 4 other small incisions with 1-2 staple sites next to incision. Dry and mainly healed.  Left groin staples removed, healed site erythematous.     Neurological:  General: No focal deficit present.  Mental Status: She is alert and oriented x 3  Psychiatric:  Attention and Perception: Attention and perception normal.  Mood and Affect: Moodand affect normal.  Speech:  Speech normal.  Behavior: Behavior normal. Behavior is cooperative.  Cognition and Memory: alert and oriented x3, Cognition and memory normal.    Assessment/Plan  59 yo female who has been admitted for PT/[place] regain mobility and strength following a fall at home with left femur fracture s/p surgery on 4/20.    Unsteady gait  c/w PT/OT for mobility, gait training, endurance, safety awareness, ADLs, and assessment of post-rehab needs.      Closed fracture of left femur (Multi)  S/p insertion IM lonny.  C/w PT/OT, follow up with ortho, is stable. Staples to knee removed. Is healed. Skin intact. Slight erythema at staple insertion site.  Groin where staples removed last week remains erythematous, no longer causing patient discomfort     Pain  c/w Tylenol, gabapentin. Patient did not want any tramadol or oxycodone prescriptions. 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  Is not on iron. Will start daily 325mg.     --Patient has rescheduled ortho appt 5/29. Instructed her to remain non weightbearing until seen by them.    --Staples removed, skin intact  --Prescriptions written and given to patient.   --Patient requires wheelchair for ADLS including dressing and hygiene where as a walker will not suffice d/t gait instability. Patient will self-propel or caregiver will assist in wheelchair in wheelchair mobility. He requires  bariatric  wheelchair. Patient hip girth is 21 and requires 22 inch wheelchair.  --All questions answered patient has no further questions or issues    Total time spent: 50 minutes directly spent on conversation with patient.  Rest of time on care through chart review, nursing staff, conversation with family if needed      Electronically Signed By: SEDA Mclain   5/29/24 12:06 PM

## 2024-05-24 NOTE — PROGRESS NOTES
Subjective- resident seen for follow up.She is sitting in her room. No health concerns.  ROS- Gen- no fever  weakness +  Resp- no cough/SOB  Chest- no pain  Abdomen- no diarrhea  No abd pain  Ext- pain left leg, knee  Psych- calm  PE VS BP- 114/51 T 96.8 P 76 Wt 217 lbs  Gen- alert, sitting in room  Neck- supple, no mass  Respiratory- CTA B/L  CVS- RRR S1S2  Abdomen- soft, non tender  Ext- left knee staples+  no pedal edema B/L  Neuro-appears normal, No gross motor deficits  Assessment/Plan-  Left femurfracture-  s/p Open Reduction Internal Fixation Femur (Left: Knee)  Insertion Intramedullary Nail Femur  Pain improving  D/C tramadol and c/w tylenol prn  staple removal this week  HTN- c/w amlodipine, clonidine  HLD- c/w lipitor  c/w PT/OT  Possible d/chome this week

## 2024-05-28 ENCOUNTER — PATIENT OUTREACH (OUTPATIENT)
Dept: PRIMARY CARE | Facility: CLINIC | Age: 59
End: 2024-05-28
Payer: COMMERCIAL

## 2024-05-28 ENCOUNTER — DOCUMENTATION (OUTPATIENT)
Dept: PRIMARY CARE | Facility: CLINIC | Age: 59
End: 2024-05-28
Payer: COMMERCIAL

## 2024-05-28 DIAGNOSIS — S72.92XA CLOSED FRACTURE OF LEFT FEMUR, UNSPECIFIED FRACTURE MORPHOLOGY, UNSPECIFIED PORTION OF FEMUR, INITIAL ENCOUNTER (MULTI): ICD-10-CM

## 2024-05-28 DIAGNOSIS — Z09 HOSPITAL DISCHARGE FOLLOW-UP: ICD-10-CM

## 2024-05-28 NOTE — PROGRESS NOTES
Discharge Facility: [facility]  Discharge Diagnosis: Closed fracture of left femur (Multi)   Admission Date: 4/15/24  Discharge Date: 4/25/24  SNF= [facility] until 5/24/24.     PCP Appointment Date: 6/4/25  Specialist Appointment Date: Orthopedic surgeon- 5/29/24  Hospital Encounter and Summary: Linked   See discharge assessment below for further details    Issues Requiring Follow-Up  Follow up with Orthopedic Surgery (Dr. Rodriguez) 3 weeks after Surgery, can call [phone]  Follow up with Trauma Surgery Clinic as Needed  Follow up with Primary Care Provider after discharge     Engagement  Call Start Time: 1535 (5/28/2024  3:46 PM)    Medications  Medications reviewed with patient/caregiver?: Not applicable (5/28/2024  3:46 PM)  Is the patient having any side effects they believe may be caused by any medication additions or changes?: No (5/28/2024  3:46 PM)  Does the patient have all medications ordered at discharge?: Yes (5/28/2024  3:46 PM)  Care Management Interventions: No intervention needed (5/28/2024  3:46 PM)  Prescription Comments: No medication changes made during SNF stay. (5/28/2024  3:46 PM)  Is the patient taking all medications as directed (includes completed medication regime)?: Yes (5/28/2024  3:46 PM)  Care Management Interventions: Provided patient education (5/28/2024  3:46 PM)  Medication Comments: See medication list (5/28/2024  3:46 PM)    Appointments  Does the patient have a primary care provider?: Yes (5/28/2024  3:46 PM)  Care Management Interventions: Verified appointment date/time/provider (Patient requested to change her visit scheduled for 6/5/24 to a virtual visit due to ongoing left leg pain that makes ambulation very difficult and painful. Message sent to PCP.) (5/28/2024  3:46 PM)  Has the patient kept scheduled appointments due by today?: Not applicable (5/28/2024  3:46 PM)  Care Management Interventions: Advised patient to keep appointment; Educated on importance of  "keeping appointment (5/28/2024  3:46 PM)    Self Management  What is the home health agency?: [facility] (5/28/2024  3:46 PM)  Has home health visited the patient within 72 hours of discharge?: Call prior to 72 hours (5/28/2024  3:46 PM)  What Durable Medical Equipment (DME) was ordered?: n/a- declined (5/28/2024  3:46 PM)    Patient Teaching  Does the patient have access to their discharge instructions?: Yes (5/28/2024  3:46 PM)  Care Management Interventions: Reviewed instructions with patient; Educated on MyChart (5/28/2024  3:46 PM)  What is the patient's perception of their health status since discharge?: Same (5/28/2024  3:46 PM)  Is the patient/caregiver able to teach back the hierarchy of who to call/visit for symptoms/problems? PCP, Specialist, Home Health nurse, Urgent Care, ED, 911: Yes (5/28/2024  3:46 PM)    Wrap Up  Wrap Up Additional Comments: Patient states she is feeling \"so so\" since returning home from the SNF post hospitalization, stating she is still having left leg pain when ambulating. Patient denied any current needs from TCM, but TCM phone number was provided with the patient encouraged to call back if needed. All follow up appts have been scheduled. Patient was educated on fall safety and when to call her PCP vs. return to the ED. Patient verbalized her understanding. (5/28/2024  3:46 PM)  Call End Time: 1538 (5/28/2024  3:46 PM)          "

## 2024-05-29 ENCOUNTER — OFFICE VISIT (OUTPATIENT)
Dept: ORTHOPEDIC SURGERY | Facility: HOSPITAL | Age: 59
End: 2024-05-29
Payer: COMMERCIAL

## 2024-05-29 ENCOUNTER — HOSPITAL ENCOUNTER (OUTPATIENT)
Dept: RADIOLOGY | Facility: HOSPITAL | Age: 59
Discharge: HOME | End: 2024-05-29
Payer: COMMERCIAL

## 2024-05-29 DIAGNOSIS — Z79.4 TYPE 2 DIABETES MELLITUS WITH RIGHT EYE AFFECTED BY PROLIFERATIVE RETINOPATHY AND MACULAR EDEMA, WITH LONG-TERM CURRENT USE OF INSULIN (MULTI): ICD-10-CM

## 2024-05-29 DIAGNOSIS — S72.402D CLOSED FRACTURE OF DISTAL END OF LEFT FEMUR WITH ROUTINE HEALING, UNSPECIFIED FRACTURE MORPHOLOGY, SUBSEQUENT ENCOUNTER: ICD-10-CM

## 2024-05-29 DIAGNOSIS — E11.3511 TYPE 2 DIABETES MELLITUS WITH RIGHT EYE AFFECTED BY PROLIFERATIVE RETINOPATHY AND MACULAR EDEMA, WITH LONG-TERM CURRENT USE OF INSULIN (MULTI): Primary | ICD-10-CM

## 2024-05-29 DIAGNOSIS — Z79.4 TYPE 2 DIABETES MELLITUS WITH RIGHT EYE AFFECTED BY PROLIFERATIVE RETINOPATHY AND MACULAR EDEMA, WITH LONG-TERM CURRENT USE OF INSULIN (MULTI): Primary | ICD-10-CM

## 2024-05-29 DIAGNOSIS — E11.3511 TYPE 2 DIABETES MELLITUS WITH RIGHT EYE AFFECTED BY PROLIFERATIVE RETINOPATHY AND MACULAR EDEMA, WITH LONG-TERM CURRENT USE OF INSULIN (MULTI): ICD-10-CM

## 2024-05-29 PROCEDURE — 73560 X-RAY EXAM OF KNEE 1 OR 2: CPT | Mod: LEFT SIDE | Performed by: RADIOLOGY

## 2024-05-29 PROCEDURE — 73560 X-RAY EXAM OF KNEE 1 OR 2: CPT | Mod: LT

## 2024-05-29 PROCEDURE — 3046F HEMOGLOBIN A1C LEVEL >9.0%: CPT | Performed by: ORTHOPAEDIC SURGERY

## 2024-05-29 PROCEDURE — 73552 X-RAY EXAM OF FEMUR 2/>: CPT | Mod: LEFT SIDE | Performed by: RADIOLOGY

## 2024-05-29 PROCEDURE — 99024 POSTOP FOLLOW-UP VISIT: CPT | Performed by: ORTHOPAEDIC SURGERY

## 2024-05-29 PROCEDURE — 1036F TOBACCO NON-USER: CPT | Performed by: ORTHOPAEDIC SURGERY

## 2024-05-29 PROCEDURE — 3060F POS MICROALBUMINURIA REV: CPT | Performed by: ORTHOPAEDIC SURGERY

## 2024-05-29 PROCEDURE — 73552 X-RAY EXAM OF FEMUR 2/>: CPT | Mod: LT

## 2024-05-29 RX ORDER — BLOOD-GLUCOSE METER
EACH MISCELLANEOUS
Qty: 200 EACH | Refills: 1 | OUTPATIENT
Start: 2024-05-29

## 2024-05-29 NOTE — ASSESSMENT & PLAN NOTE
S/p insertion IM lonny.  C/w PT/OT, follow up with ortho, is stable. Staples to knee removed. Is healed. Skin intact. Slight erythema at staple insertion site.  Groin where staples removed last week remains erythematous, no longer causing patient discomfort

## 2024-05-29 NOTE — ASSESSMENT & PLAN NOTE
c/w Tylenol, gabapentin.  Patient did not want any tramadol or oxycodone prescriptions.  Currently not in pain

## 2024-05-29 NOTE — PROGRESS NOTES
Nuris Squires is  post-op from intramedullary nail and ORIF left distal femur fracture on 4/16/2024.  she is doing well at this point.  Pain is well controlled  Denies fevers or chills.  Denies drainage from the wound.  she reports no additional symptoms or concerns. No shortness of breath or chest pain. No calf swelling or pain.    The patients full medical history, surgical history, medications, allergies, family, medical history, social history, and a complete 14 point review of systems is documented in the medical record on the signed, scanned medical intake sheet or reviewed in the history of present illness. Review of systems otherwise negative    Past Medical History:   Diagnosis Date    Personal history of other diseases of the circulatory system     History of hypertension    Personal history of other endocrine, nutritional and metabolic disease     History of hyperlipidemia    Personal history of other endocrine, nutritional and metabolic disease     History of diabetes mellitus       Medication Documentation Review Audit       Reviewed by Eulogio Romero MA (Medical Assistant) on 05/29/24 at 1107      Medication Order Taking? Sig Documenting Provider Last Dose Status   acetaminophen (Tylenol) 325 mg tablet [id number] Yes Take 2 tablets (650 mg) by mouth every 4 hours if needed for mild pain (1 - 3). Celeste Hill APRN-CNP Taking Active   amLODIPine (Norvasc) 10 mg tablet [id number] Yes Take 1 tablet (10 mg) by mouth once daily. Mone Aquino MD Taking Active   aspirin 81 mg chewable tablet [id number] Yes Chew 1 tablet (81 mg) once daily. Mone Aquino MD Taking Active   atorvastatin (Lipitor) 40 mg tablet [id number] Yes TAKE ONE TABLET BY MOUTH EVERY DAY Mone Aquino MD Taking Active   b complex vitamins capsule [id number] Yes Take 1 capsule by mouth once daily. Historical Provider, MD Taking Active   blood-glucose sensor (FreeStyle Remington 3 Sensor) device [id number] Yes Use to monitor blood  glucose. Change sensor every 14 days. Mone Aquino MD Taking Active   cholecalciferol (Vitamin D-3) 50 mcg (2,000 unit) capsule [id number] Yes Take 1 capsule (50 mcg) by mouth once daily. Mone Aquino MD Taking Active   cloNIDine (Catapres) 0.1 mg tablet [id number] Yes Take 1 tablet (0.1 mg) by mouth 2 times a day. Mone Aquino MD Taking Active   docusate sodium (Colace) 100 mg capsule [id number]  Take 1 capsule (100 mg) by mouth 2 times a day as needed for constipation. Oneida Alcala PA-C   [id number]   folic acid (Folvite) 1 mg tablet [id number] Yes Take 1 tablet (1 mg) by mouth once daily. Nina Cunha MD Taking Active   FreeStyle Remington 3 [place] misc [id number] Yes Use as instructed to monitor blood glucose. Mone Aquino MD Taking Active   gabapentin (Neurontin) 300 mg capsule [id number] Yes TAKE ONE CAPSULE BY MOUTH TWO TIMES A DAY Mone Aquino MD Taking Active   insulin glargine (Lantus) 100 unit/mL injection [id number] Yes Inject 18 Units under the skin once daily at bedtime. Take as directed per insulin instructions. Oneida Alcala PA-C Taking Active   insulin lispro (HumaLOG) 100 unit/mL injection [id number] Yes Inject 0.1 mL (10 Units) under the skin 3 times a day with meals. Mone Aquino MD Taking Active   lactulose 20 gram/30 mL oral solution [id number] Yes Take 30 mL (20 g) by mouth once daily as needed (constipation). Oneida Alcala PA-C Taking Active   meclizine (Antivert) 25 mg tablet,chewable [id number] Yes Chew 1 tablet (25 mg) 3 times a day as needed (vertigo). Mone Aquino MD Taking Active   ondansetron (Zofran) 4 mg tablet [id number] Yes Take 1 tablet (4 mg) by mouth every 8 hours if needed for nausea or vomiting. Oneida Alcala PA-C Taking Active   OneTouch Delica Plus Lancet 30 gauge misc [id number] Yes USE TO TEST BLOOD SUGAR AS DIRECTED Mone Aquino MD Taking Active   OneTouch Ultra2 Meter misc [id number] Yes use 1 to 2 times daily Mone Aquino MD Taking  "Active   OneTouch Verio test strips strip [id number] Yes USE TO CHECK BLOOD SUGAR AS DIRECTED 1-2 times daily Mone Aquino MD Taking Active   pen needle, diabetic 32 gauge x 5/32\" needle [id number] Yes Use four times a day with insulin use Mone Aquino MD Taking Active                    No Known Allergies    Social History     Socioeconomic History    Marital status: Single     Spouse name: Not on file    Number of children: Not on file    Years of education: Not on file    Highest education level: Not on file   Occupational History    Not on file   Tobacco Use    Smoking status: Never     Passive exposure: Never    Smokeless tobacco: Never   Vaping Use    Vaping status: Never Used   Substance and Sexual Activity    Alcohol use: Never    Drug use: Never    Sexual activity: Not on file   Other Topics Concern    Not on file   Social History Narrative    Not on file     Social Determinants of Health     Financial Resource Strain: Low Risk  (4/16/2024)    Overall Financial Resource Strain (CARDIA)     Difficulty of Paying Living Expenses: Not very hard   Recent Concern: Financial Resource Strain - Medium Risk (1/25/2024)    Overall Financial Resource Strain (CARDIA)     Difficulty of Paying Living Expenses: Somewhat hard   Food Insecurity: No Food Insecurity (1/25/2024)    Hunger Vital Sign     Worried About Running Out of Food in the Last Year: Never true     Ran Out of Food in the Last Year: Never true   Transportation Needs: No Transportation Needs (4/16/2024)    PRAPARE - Transportation     Lack of Transportation (Medical): No     Lack of Transportation (Non-Medical): No   Physical Activity: Insufficiently Active (1/25/2024)    Exercise Vital Sign     Days of Exercise per Week: 1 day     Minutes of Exercise per Session: 20 min   Stress: No Stress Concern Present (1/25/2024)    [place] [employer] - Occupational Stress Questionnaire     Feeling of Stress : Only a little   Social Connections: " Feeling Socially Integrated (3/15/2024)    OASIS : Social Isolation     Frequency of experiencing loneliness or isolation: Never   Intimate Partner Violence: Not At Risk (1/26/2024)    Humiliation, Afraid, Rape, and Kick questionnaire     Fear of Current or Ex-Partner: No     Emotionally Abused: No     Physically Abused: No     Sexually Abused: No   Housing Stability: Low Risk  (4/16/2024)    Housing Stability Vital Sign     Unable to Pay for Housing in the Last Year: No     Number of Places Lived in the Last Year: 1     Unstable Housing in the Last Year: No       Past Surgical History:   Procedure Laterality Date    CT ANGIO NECK  1/25/2023    CT NECK ANGIO W AND WO IV CONTRAST 1/25/2023 [facility]    CT HEAD ANGIO W AND WO IV CONTRAST  1/25/2023    CT HEAD ANGIO W AND WO IV CONTRAST 1/25/2023 DOCTOR OFFICE [facility]    OTHER SURGICAL HISTORY  10/21/2020    Toe amputation       Gen: The patient is alert and oriented ×3, is in no acute distress, and appear their stated age and weight.    Psychiatric: Mood and affect are appropriate.    Eyes: Sclera are white, and pupils are round and symmetric.    ENT: Mucous membranes are moist.     Neck: Supple. Thyroid is midline.    Respiratory: Respirations are nonlabored, chest rise is symmetric.    Cardiac: Rate is regular by palpation of distal pulses.     Abdomen: Nondistended.    Integument: No obvious cutaneous lesions are noted. No signs of lymphangitis. No signs of systemic edema.  side: left lower extremity :  her  surgical incisions are healing well, without evidence of erythema, fluctuance, drainage, or infection.  The skin around the incision is intact.  Distally neurovascular exam is stable.  There is appropriate tenderness to palpation in the selene-incisional area. No calf swelling or tenderness to palpation.      I personally reviewed multiple views of left knee and femur were obtained in the office today demonstrate maintenance of reduction, interval  healing, and a stable position of the hardware.      Nuris Squires is a 58 y.o. female patient status post intramedullary nail and ORIF left distal femur fracture on 4/16/2024.   I went over her x-rays in detail today.   she is WBAT of the side: left lower extremity. ~He/she~ is range of motion as tolerated of the side: left lower extremity.  I stressed the importance of physical therapy on overall functional outcome.  He is to work on knee range of motion at this point.  I answered all patient's questions he agrees with treatment plan.  I will see her back in Follow-up 6 week(s)with repeat 2 views left knee and 2 views femur.        Barrera Rodriguez    Department of Orthopaedic Trauma Surgery

## 2024-05-29 NOTE — ASSESSMENT & PLAN NOTE
>>ASSESSMENT AND PLAN FOR BENIGN ESSENTIAL HYPERTENSION WRITTEN ON 5/29/2024 12:01 PM BY ALISSON DEL VALLE, TAL-CNP    c/w amlodipine and clonidine  -120s

## 2024-05-29 NOTE — PROGRESS NOTES
"Chief Complaint  Discharge  Subjective  HPI:  57 yo female who has been admitted for PT/Isreal regain mobility and strength following a fall at home with left femur fracture s/p surgery on 4/20. She states she was at home in her kitchen when she fell on her left knee, denies LOC at time of event. She has a PMH of falls, DM, HTN, IDDM, CKD, onprn oxygen at home 2 L NC. During her hospital stay x-ray showed comminuted and moderately displaced intra-articular fracture  of the left distal femur. She is to be NWB and follow up with ortho in May. Lives at home with her boyfriend.  ---4/29: patientresting in wheelchair getting ready to work with PT this am. She denies pain at this time and states it has been well controlled with Tylenol. Her left leg has surgical incision well approximated and closed with staples without redness, bleeding or bruising. Patient is eating an drinking well, she has no c/o of nausea, vomiting, diarrhea or constipation. She denies fevers, chill, chest pain, dizziness or swelling.  ---05/13: patient alert, awake no acute distress. resting in bed upright. Informed by nursing this am \"Resident had complaints of itching to skin all over her body at approximately 0430. Resident scratching abdomen and arms with nails which resulted in multiple small hives appearing on Residents abdomen and right flank' , Benadryl ordered and given and itching/rash has resolved. patient denies any pain, is continuing to working with PT/OT to regain strength. She missed her appt with surgeon r/t transportation issue and will need to be reschedule appt. Patient states her appetite is good and she is drinking and eating well. no c/o chest pain, nausea, difficulty moving bowels. Patient has several knee surgery staples present that will need to be evaluated by surgeon before removal. Incision is clean, dry well approx. Today's plan to remove groin area staples r/t skin becoming irritated surrounding skin becoming " erythematous.  ---5/20  Patient sitting in chair, she denies pain or discomfort. C/o left groin staples causing skin irritation.  Appetite good.  Denies HA, dizziness, SOB, CP, N&V or constipation  ---5/23  Patient sitting in chair, she did not follow up with ortho due to transportation not being available. Appetite good. Denies pain or discomfort.  Denies HA, dizziness, SOB, CP, N&V or constipation     Review of Systems:  Reviewed chart looking at current medications, treatment, labs and x-rays and note pertinent positives or negatives, otherwise 10 point system review negative except for what is noted in HPI.    Objective  VS: 122/59, 97.0, 65, 18, 97%% on RA, 225#     Physical exam:  Physical Exam  Constitutional:  General: She is awake. She is not in acute distress.  Appearance: Normal appearance. She is obese. She is not toxic-appearing.  HENT:  Head: Normocephalic and atraumatic.  Mouth/Throat:  Mouth: Mucous membranes are moist.  Cardiovascular:  Rate and Rhythm: Normal rate and regular rhythm.  Pulmonary:  Effort:Pulmonary effort is normal.no crackles or wheezes.  Abdominal:  General: Abdomen is protruding. Patient is overweight  Palpations: Abdomen is round , soft.  Tenderness: There is no abdominal tenderness.  Musculoskeletal:  General: Normal range of motion.weight bear as tolerate with walker, and weakness in LLE  Skin: incision on left knee healing well, intact, dry , staples intact, well approx.  General: Skin is warm and dry. Left knee incision closed with multiple staples and 4 other small incisions with 1-2 staple sites next to incision. Dry and mainly healed.  Left groin staples removed, healed site erythematous.     Neurological:  General: No focal deficit present.  Mental Status: She is alert and oriented x 3  Psychiatric:  Attention and Perception: Attention and perception normal.  Mood and Affect: Moodand affect normal.  Speech: Speech normal.  Behavior: Behavior normal. Behavior is  cooperative.  Cognition and Memory: alert and oriented x3, Cognition and memory normal.    Assessment/Plan  57 yo female who has been admitted for PT/Isreal regain mobility and strength following a fall at home with left femur fracture s/p surgery on 4/20.    Unsteady gait  c/w PT/OT for mobility, gait training, endurance, safety awareness, ADLs, and assessment of post-rehab needs.      Closed fracture of left femur (Multi)  S/p insertion IM lonny.  C/w PT/OT, follow up with ortho, is stable. Staples to knee removed. Is healed. Skin intact. Slight erythema at staple insertion site.  Groin where staples removed last week remains erythematous, no longer causing patient discomfort     Pain  c/w Tylenol, gabapentin. Patient did not want any tramadol or oxycodone prescriptions. 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  Is not on iron. Will start daily 325mg.     --Patient has rescheduled ortho appt 5/29. Instructed her to remain non weightbearing until seen by them.    --Staples removed, skin intact  --Prescriptions written and given to patient.   --Patient requires wheelchair for ADLS including dressing and hygiene where as a walker will not suffice d/t gait instability. Patient will self-propel or caregiver will assist in wheelchair in wheelchair mobility. He requires bariatric  wheelchair. Patient hip girth is 21 and  requires 22 inch wheelchair.  --All questions answered patient has no further questions or issues    Total time spent: 50 minutes directly spent on conversation with patient.  Rest of time on care through chart review, nursing staff, conversation with family if needed

## 2024-05-30 ENCOUNTER — HOME CARE VISIT (OUTPATIENT)
Dept: HOME HEALTH SERVICES | Facility: HOME HEALTH | Age: 59
End: 2024-05-30
Payer: COMMERCIAL

## 2024-05-30 VITALS
DIASTOLIC BLOOD PRESSURE: 68 MMHG | OXYGEN SATURATION: 96 % | SYSTOLIC BLOOD PRESSURE: 142 MMHG | HEART RATE: 93 BPM | TEMPERATURE: 97.5 F

## 2024-05-30 PROCEDURE — G0151 HHCP-SERV OF PT,EA 15 MIN: HCPCS

## 2024-05-30 PROCEDURE — G0152 HHCP-SERV OF OT,EA 15 MIN: HCPCS

## 2024-05-30 PROCEDURE — 0023 HH SOC

## 2024-05-30 RX ORDER — BLOOD SUGAR DIAGNOSTIC
STRIP MISCELLANEOUS
Qty: 200 STRIP | Refills: 1 | Status: SHIPPED | OUTPATIENT
Start: 2024-05-30

## 2024-05-30 SDOH — HEALTH STABILITY: PHYSICAL HEALTH: EXERCISE TYPE: NOTHING IN PLACE

## 2024-05-30 ASSESSMENT — ENCOUNTER SYMPTOMS
OCCASIONAL FEELINGS OF UNSTEADINESS: 1
DYSPNEA ON EXERTION: 1
PAIN LOCATION - PAIN SEVERITY: 5/10
LOSS OF SENSATION IN FEET: 1
FATIGUES EASILY: 1
HYPERTENSION: 1
SUBJECTIVE PAIN PROGRESSION: GRADUALLY IMPROVING
PERSON REPORTING PAIN: PATIENT
PAIN SEVERITY GOAL: 0/10
LOWEST PAIN SEVERITY IN PAST 24 HOURS: 5/10
PAIN LOCATION - PAIN SEVERITY: 6/10
PAIN LOCATION - EXACERBATING FACTORS: WB
PAIN LOCATION: LEFT LEG
LIMITED RANGE OF MOTION: 1
LOWER EXTREMITY EDEMA: 1
PAIN LOCATION - PAIN DURATION: -
PAIN LOCATION - RELIEVING FACTORS: TYLENOL
HIGHEST PAIN SEVERITY IN PAST 24 HOURS: 5/10
PAIN LOCATION - PAIN DURATION: WITH ACTIVITY
PAIN LOCATION - PAIN FREQUENCY: CONSTANT
DIZZINESS: 1
DEPRESSION: 1
MUSCLE WEAKNESS: 1
PAIN LOCATION - PAIN FREQUENCY: WITH ACTIVITY
PAIN LOCATION - PAIN QUALITY: ACHE
PAIN: 1
PAIN LOCATION - RELIEVING FACTORS: REST, TYLENOL
PAIN LOCATION - EXACERBATING FACTORS: STANDING, WALKING
PERSON REPORTING PAIN: PATIENT
ARTHRALGIAS: 1
PAIN LOCATION - PAIN QUALITY: ACHE
PAIN: 1
PAIN LOCATION: LEFT KNEE

## 2024-05-30 ASSESSMENT — ACTIVITIES OF DAILY LIVING (ADL)
AMBULATION ASSISTANCE: 1
PHYSICAL TRANSFERS ASSESSED: 1
TOILETING: SUPERVISION
AMBULATION ASSISTANCE ON FLAT SURFACES: 1
TOILETING: 1
BATHING_CURRENT_FUNCTION: MAXIMUM ASSIST
BATHING ASSESSED: 1
DRESSING_LB_CURRENT_FUNCTION: MODERATE ASSIST
ENTERING_EXITING_HOME: MINIMUM ASSIST
PHYSICAL TRANSFERS ASSESSED: 1
CURRENT_FUNCTION: STAND BY ASSIST
BATHING EQUIPMENT USED: SHOWER BENCH
AMBULATION ASSISTANCE: STAND BY ASSIST
PREPARING MEALS: NEEDS ASSISTANCE
OASIS_M1830: 05
AMBULATION_DISTANCE/DURATION_TOLERATED: 5 FT
AMBULATION ASSISTANCE: ONE PERSON

## 2024-05-30 ASSESSMENT — BALANCE ASSESSMENTS: STANDING UNSUPPORTED: 4
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
## 2024-05-30 NOTE — Clinical Note
PT SOC performed 5/30/24. Scored 12/27 on depression severity scale, indicating moderate depression. Plan to refer to MSW.

## 2024-06-04 ENCOUNTER — HOME CARE VISIT (OUTPATIENT)
Dept: HOME HEALTH SERVICES | Facility: HOME HEALTH | Age: 59
End: 2024-06-04
Payer: COMMERCIAL

## 2024-06-04 ENCOUNTER — TELEMEDICINE (OUTPATIENT)
Dept: PRIMARY CARE | Facility: CLINIC | Age: 59
End: 2024-06-04
Payer: COMMERCIAL

## 2024-06-04 VITALS
HEART RATE: 85 BPM | OXYGEN SATURATION: 96 % | TEMPERATURE: 97.3 F | SYSTOLIC BLOOD PRESSURE: 128 MMHG | DIASTOLIC BLOOD PRESSURE: 76 MMHG

## 2024-06-04 DIAGNOSIS — Z79.4 TYPE 2 DIABETES MELLITUS WITH RIGHT EYE AFFECTED BY PROLIFERATIVE RETINOPATHY AND MACULAR EDEMA, WITH LONG-TERM CURRENT USE OF INSULIN (MULTI): ICD-10-CM

## 2024-06-04 DIAGNOSIS — S72.92XA CLOSED FRACTURE OF LEFT FEMUR, UNSPECIFIED FRACTURE MORPHOLOGY, UNSPECIFIED PORTION OF FEMUR, INITIAL ENCOUNTER (MULTI): Primary | ICD-10-CM

## 2024-06-04 DIAGNOSIS — I10 BENIGN ESSENTIAL HYPERTENSION: ICD-10-CM

## 2024-06-04 DIAGNOSIS — E11.3511 TYPE 2 DIABETES MELLITUS WITH RIGHT EYE AFFECTED BY PROLIFERATIVE RETINOPATHY AND MACULAR EDEMA, WITH LONG-TERM CURRENT USE OF INSULIN (MULTI): ICD-10-CM

## 2024-06-04 PROCEDURE — 3046F HEMOGLOBIN A1C LEVEL >9.0%: CPT | Performed by: FAMILY MEDICINE

## 2024-06-04 PROCEDURE — G0151 HHCP-SERV OF PT,EA 15 MIN: HCPCS

## 2024-06-04 PROCEDURE — 3060F POS MICROALBUMINURIA REV: CPT | Performed by: FAMILY MEDICINE

## 2024-06-04 PROCEDURE — G0152 HHCP-SERV OF OT,EA 15 MIN: HCPCS

## 2024-06-04 PROCEDURE — 99442 PR PHYS/QHP TELEPHONE EVALUATION 11-20 MIN: CPT | Performed by: FAMILY MEDICINE

## 2024-06-04 RX ORDER — INSULIN LISPRO 100 [IU]/ML
INJECTION, SOLUTION INTRAVENOUS; SUBCUTANEOUS
Qty: 30 ML | Refills: 2 | Status: SHIPPED | OUTPATIENT
Start: 2024-06-04

## 2024-06-04 RX ORDER — INSULIN GLARGINE 100 [IU]/ML
18 INJECTION, SOLUTION SUBCUTANEOUS NIGHTLY
Qty: 18 ML | Refills: 1 | Status: SHIPPED | OUTPATIENT
Start: 2024-06-04 | End: 2024-12-21

## 2024-06-04 ASSESSMENT — ENCOUNTER SYMPTOMS
PAIN: 1
SUBJECTIVE PAIN PROGRESSION: GRADUALLY IMPROVING
HIGHEST PAIN SEVERITY IN PAST 24 HOURS: 5/10
PAIN LOCATION: LEFT LEG
PAIN LOCATION - PAIN DURATION: -
PAIN LOCATION - RELIEVING FACTORS: REST, TYLENOL
PERSON REPORTING PAIN: PATIENT
PAIN: 1
PAIN LOCATION - PAIN FREQUENCY: CONSTANT
PAIN LOCATION - PAIN FREQUENCY: INTERMITTENT
PERSON REPORTING PAIN: PATIENT
PAIN LOCATION - PAIN SEVERITY: 5/10
PAIN LOCATION - PAIN QUALITY: ACHE
PAIN LOCATION - RELIEVING FACTORS: TYLENOL PRN
PAIN LOCATION: LEFT KNEE
PAIN LOCATION - PAIN SEVERITY: 5/10
LOWEST PAIN SEVERITY IN PAST 24 HOURS: 0/10
PAIN LOCATION - EXACERBATING FACTORS: INCREASED ACTIVITY
PAIN SEVERITY GOAL: 0/10
PAIN LOCATION - PAIN DURATION: INTERMITTENT
PAIN LOCATION - PAIN QUALITY: ACHE

## 2024-06-04 ASSESSMENT — ACTIVITIES OF DAILY LIVING (ADL)
AMBULATION ASSISTANCE: 1
PHYSICAL_TRANSFERS_DEVICES: FWW
AMBULATION ASSISTANCE: CONTACT GUARD ASSIST
PHYSICAL TRANSFERS ASSESSED: 1
CURRENT_FUNCTION: STAND BY ASSIST
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
## 2024-06-05 PROCEDURE — G0180 MD CERTIFICATION HHA PATIENT: HCPCS | Performed by: FAMILY MEDICINE

## 2024-06-06 ENCOUNTER — HOME CARE VISIT (OUTPATIENT)
Dept: HOME HEALTH SERVICES | Facility: HOME HEALTH | Age: 59
End: 2024-06-06
Payer: COMMERCIAL

## 2024-06-06 VITALS
HEART RATE: 78 BPM | SYSTOLIC BLOOD PRESSURE: 142 MMHG | DIASTOLIC BLOOD PRESSURE: 88 MMHG | TEMPERATURE: 97.2 F | OXYGEN SATURATION: 95 %

## 2024-06-06 PROCEDURE — G0151 HHCP-SERV OF PT,EA 15 MIN: HCPCS

## 2024-06-06 PROCEDURE — G0152 HHCP-SERV OF OT,EA 15 MIN: HCPCS

## 2024-06-06 ASSESSMENT — ENCOUNTER SYMPTOMS
PAIN SEVERITY GOAL: 0/10
PAIN: 1
PAIN LOCATION: LEFT KNEE
PERSON REPORTING PAIN: PATIENT
HIGHEST PAIN SEVERITY IN PAST 24 HOURS: 6/10
PAIN LOCATION - PAIN QUALITY: ACHE
PAIN LOCATION - RELIEVING FACTORS: REST, TYLENOL
PAIN LOCATION - PAIN FREQUENCY: INTERMITTENT
SUBJECTIVE PAIN PROGRESSION: GRADUALLY IMPROVING
PAIN LOCATION - EXACERBATING FACTORS: INCREASED ACTIVITY
PAIN: 1
LOWEST PAIN SEVERITY IN PAST 24 HOURS: 5/10
PAIN LOCATION: LEFT LEG
PAIN LOCATION - PAIN SEVERITY: 5/10
PERSON REPORTING PAIN: PATIENT
PAIN LOCATION - PAIN DURATION: INTERMITTENT
PAIN LOCATION - PAIN SEVERITY: 5/10

## 2024-06-06 ASSESSMENT — ACTIVITIES OF DAILY LIVING (ADL)
PHYSICAL TRANSFERS ASSESSED: 1
CURRENT_FUNCTION: SUPERVISION
AMBULATION ASSISTANCE: CONTACT GUARD ASSIST
AMBULATION ASSISTANCE: 1

## 2024-06-07 ENCOUNTER — PATIENT OUTREACH (OUTPATIENT)
Dept: PRIMARY CARE | Facility: CLINIC | Age: 59
End: 2024-06-07
Payer: COMMERCIAL

## 2024-06-07 ENCOUNTER — HOME CARE VISIT (OUTPATIENT)
Dept: HOME HEALTH SERVICES | Facility: HOME HEALTH | Age: 59
End: 2024-06-07
Payer: COMMERCIAL

## 2024-06-07 DIAGNOSIS — Z09 HOSPITAL DISCHARGE FOLLOW-UP: ICD-10-CM

## 2024-06-07 PROCEDURE — G0155 HHCP-SVS OF CSW,EA 15 MIN: HCPCS

## 2024-06-07 SDOH — HEALTH STABILITY: MENTAL HEALTH: DRINKING ASSESSMENT COMMENTS: DENIES ALCOHOL CONSUMPTION

## 2024-06-07 ASSESSMENT — ACTIVITIES OF DAILY LIVING (ADL): AMBULATION_REQUIRES_ASSISTANCE: 1

## 2024-06-07 NOTE — PROGRESS NOTES
Call regarding appt. with PCP on 6/4/24 after hospitalization.  At time of outreach call the patient feels as if their condition has improved since last visit. Reviewed the PCP appointment with the pt and addressed any questions or concerns. Patient states she is doing very well and has no questions, concerns or needs from TCM. Patient was encouraged to call back if needed. TCM to reassess patient needs 30 days post discharge.

## 2024-06-09 PROBLEM — L08.9 RIGHT FOOT INFECTION: Status: RESOLVED | Noted: 2024-01-09 | Resolved: 2024-06-09

## 2024-06-09 NOTE — PATIENT INSTRUCTIONS
Follow up after hospitalization for left femur fracture.  Pain is controlled with Tylenol.  Getting home PT and OT, working on walking.  Will follow up wit orthopedist as well.    BP  has  been controlled.  Continue to monitor.  Continue taking amlodipine, clonidine.    Glucose running high.  Last A1C 11.9, in April   (had been 13.1 prior to that)  Taking Basaglar 18 units with sliding scale humalog prior to meals.  Follow up in office when able, and will recheck A1C when it's been 3 months.  Monitor glucose at home, and email or call with readings, so we can adjust insulin dose if needed.  
No

## 2024-06-11 ENCOUNTER — HOME CARE VISIT (OUTPATIENT)
Dept: HOME HEALTH SERVICES | Facility: HOME HEALTH | Age: 59
End: 2024-06-11
Payer: COMMERCIAL

## 2024-06-11 VITALS
DIASTOLIC BLOOD PRESSURE: 80 MMHG | OXYGEN SATURATION: 97 % | TEMPERATURE: 97.4 F | HEART RATE: 88 BPM | SYSTOLIC BLOOD PRESSURE: 140 MMHG

## 2024-06-11 PROCEDURE — G0152 HHCP-SERV OF OT,EA 15 MIN: HCPCS

## 2024-06-11 PROCEDURE — G0151 HHCP-SERV OF PT,EA 15 MIN: HCPCS

## 2024-06-11 ASSESSMENT — ENCOUNTER SYMPTOMS
PAIN LOCATION - RELIEVING FACTORS: REST, TYLENOL
PAIN LOCATION - PAIN QUALITY: ACHE
PAIN LOCATION: LEFT HIP
PAIN SEVERITY GOAL: 0/10
PAIN LOCATION - PAIN FREQUENCY: CONSTANT
PAIN LOCATION - PAIN SEVERITY: 6/10
PAIN LOCATION: LEFT LEG
LOWEST PAIN SEVERITY IN PAST 24 HOURS: 6/10
PAIN LOCATION - EXACERBATING FACTORS: INCREASED ACTIVITY
PAIN: 1
PERSON REPORTING PAIN: PATIENT
SUBJECTIVE PAIN PROGRESSION: GRADUALLY IMPROVING
PAIN LOCATION - PAIN FREQUENCY: INTERMITTENT
PAIN: 1
HIGHEST PAIN SEVERITY IN PAST 24 HOURS: 6/10
PAIN LOCATION - PAIN DURATION: INTERMITTENT
PAIN LOCATION - PAIN DURATION: -
PERSON REPORTING PAIN: PATIENT
PAIN LOCATION - EXACERBATING FACTORS: AMB, WB
PAIN LOCATION - PAIN SEVERITY: 6/10
PAIN LOCATION - PAIN QUALITY: ACHE
PAIN LOCATION - RELIEVING FACTORS: COLD PACK

## 2024-06-11 ASSESSMENT — ACTIVITIES OF DAILY LIVING (ADL)
PHYSICAL TRANSFERS ASSESSED: 1
AMBULATION ASSISTANCE: SUPERVISION
AMBULATION ASSISTANCE: 1
CURRENT_FUNCTION: SUPERVISION
PHYSICAL_TRANSFERS_DEVICES: FWW

## 2024-06-13 ENCOUNTER — APPOINTMENT (OUTPATIENT)
Dept: ORTHOPEDIC SURGERY | Facility: HOSPITAL | Age: 59
End: 2024-06-13
Payer: COMMERCIAL

## 2024-06-14 ENCOUNTER — HOME CARE VISIT (OUTPATIENT)
Dept: HOME HEALTH SERVICES | Facility: HOME HEALTH | Age: 59
End: 2024-06-14
Payer: COMMERCIAL

## 2024-06-14 VITALS
TEMPERATURE: 97.5 F | OXYGEN SATURATION: 97 % | HEART RATE: 70 BPM | SYSTOLIC BLOOD PRESSURE: 140 MMHG | DIASTOLIC BLOOD PRESSURE: 82 MMHG

## 2024-06-14 PROCEDURE — G0157 HHC PT ASSISTANT EA 15: HCPCS | Mod: CQ

## 2024-06-14 PROCEDURE — G0152 HHCP-SERV OF OT,EA 15 MIN: HCPCS

## 2024-06-14 ASSESSMENT — ENCOUNTER SYMPTOMS
PAIN LOCATION - PAIN FREQUENCY: INTERMITTENT
PAIN LOCATION - PAIN SEVERITY: 4/10
PAIN LOCATION - PAIN QUALITY: ACHE
PERSON REPORTING PAIN: PATIENT
PAIN LOCATION: LEFT HIP
PAIN LOCATION - PAIN DURATION: INTERMITTENT
PAIN: 1
PAIN LOCATION - EXACERBATING FACTORS: INCREASED ACTIVITY

## 2024-06-17 ENCOUNTER — APPOINTMENT (OUTPATIENT)
Dept: PHARMACY | Facility: HOSPITAL | Age: 59
End: 2024-06-17
Payer: COMMERCIAL

## 2024-06-17 DIAGNOSIS — E11.3511 TYPE 2 DIABETES MELLITUS WITH RIGHT EYE AFFECTED BY PROLIFERATIVE RETINOPATHY AND MACULAR EDEMA, WITH LONG-TERM CURRENT USE OF INSULIN (MULTI): ICD-10-CM

## 2024-06-17 DIAGNOSIS — Z79.4 TYPE 2 DIABETES MELLITUS WITH RIGHT EYE AFFECTED BY PROLIFERATIVE RETINOPATHY AND MACULAR EDEMA, WITH LONG-TERM CURRENT USE OF INSULIN (MULTI): ICD-10-CM

## 2024-06-17 NOTE — ASSESSMENT & PLAN NOTE
Diabetes: uncontrolled with last A1c 11.9% on 4/16/24. Current regimen includes basaglar 25 units once daily (or 18 units if BG < 150) and Humalog 18 units with meals. Home BG readings reported as consistently >300. Has remington, but receiving errors when attempting to connect with phone. Advised phone may be incompatible and to try using reader, contact tech support if still unable to connect. Due to BG above goal, plan to increase basal insulin by 20% and follow-up in 3 weeks to assess.  Increase basaglar to 30 units every evening. If BG < 150, take only 15 units.  Continue taking Humalog 18 units with meals  Continue to monitor and record BG daily. Start monitoring with Remington 3 and reader.  Continue to focus on healthy eating by searching for well-balanced crockpot recipes. Increase physical activity gradually as able.
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
## 2024-06-18 ENCOUNTER — HOME CARE VISIT (OUTPATIENT)
Dept: HOME HEALTH SERVICES | Facility: HOME HEALTH | Age: 59
End: 2024-06-18
Payer: COMMERCIAL

## 2024-06-18 PROCEDURE — G0157 HHC PT ASSISTANT EA 15: HCPCS | Mod: CQ

## 2024-06-18 PROCEDURE — G0152 HHCP-SERV OF OT,EA 15 MIN: HCPCS

## 2024-06-18 ASSESSMENT — ENCOUNTER SYMPTOMS
PAIN LOCATION - EXACERBATING FACTORS: INCREASED ACTIVITY
PERSON REPORTING PAIN: PATIENT
PAIN LOCATION: LEFT LEG
PAIN LOCATION - PAIN DURATION: INTERMITTENT
PAIN: 1
PAIN LOCATION - PAIN SEVERITY: 6/10
PAIN LOCATION - RELIEVING FACTORS: REST
PAIN LOCATION - PAIN QUALITY: ACHE
PAIN LOCATION - PAIN FREQUENCY: INTERMITTENT

## 2024-06-20 ENCOUNTER — HOME CARE VISIT (OUTPATIENT)
Dept: HOME HEALTH SERVICES | Facility: HOME HEALTH | Age: 59
End: 2024-06-20
Payer: COMMERCIAL

## 2024-06-20 VITALS
SYSTOLIC BLOOD PRESSURE: 120 MMHG | HEART RATE: 73 BPM | OXYGEN SATURATION: 98 % | TEMPERATURE: 97.3 F | DIASTOLIC BLOOD PRESSURE: 68 MMHG

## 2024-06-20 PROCEDURE — G0157 HHC PT ASSISTANT EA 15: HCPCS | Mod: CQ

## 2024-06-20 PROCEDURE — G0152 HHCP-SERV OF OT,EA 15 MIN: HCPCS

## 2024-06-20 ASSESSMENT — ENCOUNTER SYMPTOMS
PAIN LOCATION - EXACERBATING FACTORS: INCREASED ACTIVITY
PAIN: 1
PAIN LOCATION - PAIN FREQUENCY: INTERMITTENT
PAIN LOCATION - PAIN QUALITY: ACHE
PAIN LOCATION - RELIEVING FACTORS: REST
PAIN LOCATION - PAIN SEVERITY: 3/10
PAIN LOCATION - PAIN DURATION: INTERMITTENT
PAIN LOCATION: LEFT LEG
PERSON REPORTING PAIN: PATIENT

## 2024-06-20 ASSESSMENT — ACTIVITIES OF DAILY LIVING (ADL)
CURRENT_FUNCTION: SUPERVISION
BATHING EQUIPMENT USED: SPONGE BATHING
BATHING ASSESSED: 1
PREPARING MEALS: INDEPENDENT
TOILETING: INDEPENDENT
TOILETING: 1
BATHING_CURRENT_FUNCTION: INDEPENDENT
PHYSICAL TRANSFERS ASSESSED: 1
DRESSING_LB_CURRENT_FUNCTION: INDEPENDENT

## 2024-06-21 ENCOUNTER — TELEPHONE (OUTPATIENT)
Dept: PRIMARY CARE | Facility: CLINIC | Age: 59
End: 2024-06-21
Payer: COMMERCIAL

## 2024-06-21 DIAGNOSIS — E11.3511 TYPE 2 DIABETES MELLITUS WITH RIGHT EYE AFFECTED BY PROLIFERATIVE RETINOPATHY AND MACULAR EDEMA, WITH LONG-TERM CURRENT USE OF INSULIN (MULTI): Primary | ICD-10-CM

## 2024-06-21 DIAGNOSIS — Z79.4 TYPE 2 DIABETES MELLITUS WITH RIGHT EYE AFFECTED BY PROLIFERATIVE RETINOPATHY AND MACULAR EDEMA, WITH LONG-TERM CURRENT USE OF INSULIN (MULTI): Primary | ICD-10-CM

## 2024-06-21 DIAGNOSIS — S72.92XA CLOSED FRACTURE OF LEFT FEMUR, UNSPECIFIED FRACTURE MORPHOLOGY, UNSPECIFIED PORTION OF FEMUR, INITIAL ENCOUNTER (MULTI): ICD-10-CM
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
## 2024-06-26 ENCOUNTER — HOME CARE VISIT (OUTPATIENT)
Dept: HOME HEALTH SERVICES | Facility: HOME HEALTH | Age: 59
End: 2024-06-26
Payer: COMMERCIAL

## 2024-06-26 PROCEDURE — G0151 HHCP-SERV OF PT,EA 15 MIN: HCPCS

## 2024-06-26 SDOH — HEALTH STABILITY: PHYSICAL HEALTH: EXERCISE TYPE: INDEP

## 2024-06-26 ASSESSMENT — ACTIVITIES OF DAILY LIVING (ADL)
AMBULATION ASSISTANCE: INDEPENDENT
AMBULATION ASSISTANCE: 1
PHYSICAL TRANSFERS ASSESSED: 1
HOME_HEALTH_OASIS: 00
PHYSICAL_TRANSFERS_DEVICES: FWW
CURRENT_FUNCTION: INDEPENDENT
AMBULATION ASSISTANCE ON FLAT SURFACES: 1
OASIS_M1830: 01

## 2024-06-26 ASSESSMENT — ENCOUNTER SYMPTOMS
HIGHEST PAIN SEVERITY IN PAST 24 HOURS: 1/10
SUBJECTIVE PAIN PROGRESSION: GRADUALLY IMPROVING
DENIES PAIN: 1
MUSCLE WEAKNESS: 1
OCCASIONAL FEELINGS OF UNSTEADINESS: 0
PAIN SEVERITY GOAL: 0/10
PERSON REPORTING PAIN: PATIENT
LOWEST PAIN SEVERITY IN PAST 24 HOURS: 0/10

## 2024-07-01 DIAGNOSIS — E78.2 MIXED HYPERLIPIDEMIA: ICD-10-CM

## 2024-07-01 RX ORDER — ATORVASTATIN CALCIUM 40 MG/1
40 TABLET, FILM COATED ORAL DAILY
Qty: 90 TABLET | Refills: 0 | Status: SHIPPED | OUTPATIENT
Start: 2024-07-01

## 2024-07-03 ENCOUNTER — PATIENT OUTREACH (OUTPATIENT)
Dept: PRIMARY CARE | Facility: CLINIC | Age: 59
End: 2024-07-03
Payer: COMMERCIAL

## 2024-07-03 DIAGNOSIS — Z09 HOSPITAL DISCHARGE FOLLOW-UP: ICD-10-CM

## 2024-07-03 NOTE — PROGRESS NOTES
Successful outreach to patient regarding hospitalization as patient continues TCM program. At time of outreach call the patient feels as if their condition has improved since initial visit with PCP or specialist. Questions or concerns addressed at this time with patient. Provided contact information to patient if any further non-emergent needs arise.  Patient states she has been doing very well lately with no acute changes or concerns to report at this time. Patient declined further TCM outreaches stating it is not needed. Patient has met target of no readmission for 30 days post hospital discharge and is graduated from Transitional Care Management program at this time.

## 2024-07-09 ENCOUNTER — APPOINTMENT (OUTPATIENT)
Dept: PHARMACY | Facility: HOSPITAL | Age: 59
End: 2024-07-09
Payer: COMMERCIAL

## 2024-07-09 ENCOUNTER — SPECIALTY PHARMACY (OUTPATIENT)
Dept: PHARMACY | Facility: CLINIC | Age: 59
End: 2024-07-09

## 2024-07-09 DIAGNOSIS — E11.3511 TYPE 2 DIABETES MELLITUS WITH RIGHT EYE AFFECTED BY PROLIFERATIVE RETINOPATHY AND MACULAR EDEMA, WITH LONG-TERM CURRENT USE OF INSULIN (MULTI): ICD-10-CM

## 2024-07-09 DIAGNOSIS — Z79.4 TYPE 2 DIABETES MELLITUS WITH RIGHT EYE AFFECTED BY PROLIFERATIVE RETINOPATHY AND MACULAR EDEMA, WITH LONG-TERM CURRENT USE OF INSULIN (MULTI): ICD-10-CM

## 2024-07-09 RX ORDER — TIRZEPATIDE 2.5 MG/.5ML
2.5 INJECTION, SOLUTION SUBCUTANEOUS
Qty: 2 ML | Refills: 0 | Status: SHIPPED | OUTPATIENT
Start: 2024-07-09
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
## 2024-07-09 NOTE — ASSESSMENT & PLAN NOTE
Diabetes: uncontrolled with last A1c 11.9% on 4/16/24. Current regimen includes basaglar 30 units once daily (or 15 units if BG < 150), Humalog 18 units with meals. Home BG readings now monitored with Remington 3, but patient did not have reader with her today so unable to review data. She recalls BG ranging from 80's to high 300's. Patient has upcoming endo appt, encouraged to bring Remington reader to appt. No insulin adjustments at this time due to limited BG information. Due to widely variable BG, plan to start GLP-1 RA. Provided medication counseling, patient agreeable. Due to high copay, pharmacist will facilitate application for  PAP.  Start Mounjaro 2.5mg once weekly as directed.  Patient to provide financial documentation for  PAP application.  Continue taking basaglar 30 units once daily (or 15 units if BG < 150), Humalog 18 units with meals.  Continue to monitor BG frequently with Remington 3. Keep reader within 30 feet of sensor whenever possible.

## 2024-07-11 ENCOUNTER — OFFICE VISIT (OUTPATIENT)
Dept: ORTHOPEDIC SURGERY | Facility: HOSPITAL | Age: 59
End: 2024-07-11
Payer: COMMERCIAL

## 2024-07-11 ENCOUNTER — HOSPITAL ENCOUNTER (OUTPATIENT)
Dept: RADIOLOGY | Facility: HOSPITAL | Age: 59
Discharge: HOME | End: 2024-07-11
Payer: COMMERCIAL

## 2024-07-11 DIAGNOSIS — S72.402D CLOSED FRACTURE OF DISTAL END OF LEFT FEMUR WITH ROUTINE HEALING, UNSPECIFIED FRACTURE MORPHOLOGY, SUBSEQUENT ENCOUNTER: Primary | ICD-10-CM

## 2024-07-11 DIAGNOSIS — S72.402D CLOSED FRACTURE OF DISTAL END OF LEFT FEMUR WITH ROUTINE HEALING, UNSPECIFIED FRACTURE MORPHOLOGY, SUBSEQUENT ENCOUNTER: ICD-10-CM

## 2024-07-11 PROCEDURE — 1036F TOBACCO NON-USER: CPT | Performed by: ORTHOPAEDIC SURGERY

## 2024-07-11 PROCEDURE — 99211 OFF/OP EST MAY X REQ PHY/QHP: CPT | Performed by: ORTHOPAEDIC SURGERY

## 2024-07-11 PROCEDURE — 73560 X-RAY EXAM OF KNEE 1 OR 2: CPT | Mod: LT

## 2024-07-11 PROCEDURE — 3060F POS MICROALBUMINURIA REV: CPT | Performed by: ORTHOPAEDIC SURGERY

## 2024-07-11 PROCEDURE — 73552 X-RAY EXAM OF FEMUR 2/>: CPT | Mod: LT

## 2024-07-11 PROCEDURE — 3046F HEMOGLOBIN A1C LEVEL >9.0%: CPT | Performed by: ORTHOPAEDIC SURGERY

## 2024-07-11 ASSESSMENT — PAIN - FUNCTIONAL ASSESSMENT: PAIN_FUNCTIONAL_ASSESSMENT: 0-10

## 2024-07-11 ASSESSMENT — PAIN DESCRIPTION - DESCRIPTORS: DESCRIPTORS: ACHING

## 2024-07-11 ASSESSMENT — PAIN SCALES - GENERAL: PAINLEVEL_OUTOF10: 4
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
## 2024-07-17 ENCOUNTER — APPOINTMENT (OUTPATIENT)
Dept: PULMONOLOGY | Facility: CLINIC | Age: 59
End: 2024-07-17
Payer: COMMERCIAL

## 2024-07-19 ENCOUNTER — APPOINTMENT (OUTPATIENT)
Dept: ENDOCRINOLOGY | Facility: CLINIC | Age: 59
End: 2024-07-19
Payer: COMMERCIAL

## 2024-07-19 VITALS
WEIGHT: 224 LBS | DIASTOLIC BLOOD PRESSURE: 100 MMHG | BODY MASS INDEX: 41.22 KG/M2 | HEART RATE: 75 BPM | SYSTOLIC BLOOD PRESSURE: 190 MMHG | HEIGHT: 62 IN

## 2024-07-19 DIAGNOSIS — Z79.4 TYPE 2 DIABETES MELLITUS WITH RIGHT EYE AFFECTED BY PROLIFERATIVE RETINOPATHY AND MACULAR EDEMA, WITH LONG-TERM CURRENT USE OF INSULIN (MULTI): Primary | ICD-10-CM

## 2024-07-19 DIAGNOSIS — I10 BENIGN ESSENTIAL HYPERTENSION: ICD-10-CM

## 2024-07-19 DIAGNOSIS — M14.671 CHARCOT'S JOINT, RIGHT ANKLE AND FOOT: ICD-10-CM

## 2024-07-19 DIAGNOSIS — E78.2 MIXED HYPERLIPIDEMIA: ICD-10-CM

## 2024-07-19 DIAGNOSIS — Z79.4 TYPE 2 DIABETES MELLITUS WITH RIGHT EYE AFFECTED BY PROLIFERATIVE RETINOPATHY AND MACULAR EDEMA, WITH LONG-TERM CURRENT USE OF INSULIN (MULTI): ICD-10-CM

## 2024-07-19 DIAGNOSIS — E11.3511 TYPE 2 DIABETES MELLITUS WITH RIGHT EYE AFFECTED BY PROLIFERATIVE RETINOPATHY AND MACULAR EDEMA, WITH LONG-TERM CURRENT USE OF INSULIN (MULTI): ICD-10-CM

## 2024-07-19 DIAGNOSIS — E11.3511 TYPE 2 DIABETES MELLITUS WITH RIGHT EYE AFFECTED BY PROLIFERATIVE RETINOPATHY AND MACULAR EDEMA, WITH LONG-TERM CURRENT USE OF INSULIN (MULTI): Primary | ICD-10-CM

## 2024-07-19 DIAGNOSIS — E11.43 DIABETIC AUTONOMIC NEUROPATHY ASSOCIATED WITH TYPE 2 DIABETES MELLITUS (MULTI): ICD-10-CM

## 2024-07-19 LAB — POC HEMOGLOBIN A1C: 11.2 % (ref 4.2–6.5)

## 2024-07-19 PROCEDURE — 99204 OFFICE O/P NEW MOD 45 MIN: CPT | Performed by: NURSE PRACTITIONER

## 2024-07-19 PROCEDURE — 3046F HEMOGLOBIN A1C LEVEL >9.0%: CPT | Performed by: NURSE PRACTITIONER

## 2024-07-19 PROCEDURE — 3080F DIAST BP >= 90 MM HG: CPT | Performed by: NURSE PRACTITIONER

## 2024-07-19 PROCEDURE — 3008F BODY MASS INDEX DOCD: CPT | Performed by: NURSE PRACTITIONER

## 2024-07-19 PROCEDURE — 83036 HEMOGLOBIN GLYCOSYLATED A1C: CPT | Performed by: NURSE PRACTITIONER

## 2024-07-19 PROCEDURE — 3077F SYST BP >= 140 MM HG: CPT | Performed by: NURSE PRACTITIONER

## 2024-07-19 PROCEDURE — 3060F POS MICROALBUMINURIA REV: CPT | Performed by: NURSE PRACTITIONER

## 2024-07-19 PROCEDURE — 1036F TOBACCO NON-USER: CPT | Performed by: NURSE PRACTITIONER

## 2024-07-19 ASSESSMENT — ENCOUNTER SYMPTOMS
LOSS OF SENSATION IN FEET: 0
OCCASIONAL FEELINGS OF UNSTEADINESS: 0
DEPRESSION: 0

## 2024-07-19 ASSESSMENT — LIFESTYLE VARIABLES
HOW MANY STANDARD DRINKS CONTAINING ALCOHOL DO YOU HAVE ON A TYPICAL DAY: 1 OR 2
HOW OFTEN DO YOU HAVE A DRINK CONTAINING ALCOHOL: MONTHLY OR LESS
HOW OFTEN DO YOU HAVE SIX OR MORE DRINKS ON ONE OCCASION: NEVER
AUDIT-C TOTAL SCORE: 1
SKIP TO QUESTIONS 9-10: 1

## 2024-07-19 ASSESSMENT — PAIN SCALES - GENERAL: PAINLEVEL: 0-NO PAIN

## 2024-07-19 NOTE — PATIENT INSTRUCTIONS
KEEP BASAGLAR AT 30 UNITS  OVER THE NEXT WEEK IF YOUR WAKING BS IS NOT AROUND 130-150 I WANT YOU TO INCREASE THE BASAGLAR BY 2 UNITS EVERY 3 DAYS UNTIL YOUR WAKING BS IS CLOSER .     MEAL TIME INSULIN     Blood Sugar.................Units   .........................10  151-200.......................12  201-250.......................14   251-300.......................16   301-350.......................18   351-400.......................20   401 or greater...........22

## 2024-07-19 NOTE — LETTER
July 19, 2024     Mone Aquino MD  [address]  [address]  [facility] [address]    Patient: Nuris Squires   YOB: 1965   Date of Visit: 7/19/2024       Dear Dr. Mone Aquino MD:    Thank you for referring Nuris Squires to me for evaluation. Below are my notes for this consultation.  If you have questions, please do not hesitate to call me. I look forward to following your patient along with you.       Sincerely,     Vaughn Souza, APRN-CNP      CC: Amanda Joe, PharmD  ______________________________________________________________________________________    HPI  Presents as new patient/metabolic management 58 yo with DM Type 2 + neuropathy + retinopathy, nephropathy, diagnosed mid 40's History Uncontrolled HTN, HLD Obesity, Left femur fracture/repair 04/2024, PAD, CKD, Anemia. Current A1C% 11.2% (Hgb 7.6 ?) was 11.9%. Patient testing sugars times day. No lows. Following carb controlled diet somewhat and does not really know reasonable carb allowances. Patient not able to afford all their medications. Patient is not exercising.    -CGM Remington 3 with reader. Currently on insulin checking BS at least 4 xs a day adjustments made based on readings/frequent visits to manage.  -INSULIN Basaglar 30 units or 15 if BS < 150? Humalog 18 units each meal Basal 30 units with instruction on how to increase prandial base 10 +2 50>150  -GLP1/GIP Mounjaro did not  yet SGLT2 consider at FU ? renal function   -HTN Amlodipine Clonidine has not taken medications today   -STATIN Atorvastatin 40 mg LDL    04/2024 Creatinine 1.93 GFR 30 labs from hospital admission  05/2024 Rehab [facility] results reviewed Creatinine 1.65 GFR 36    Has been seeing Clinical Pharmacist Telehealth  Cannot upload CGM, data reviewed dariusz in target 3 % no lows, otherwise above target      Current Outpatient Medications:   •  acetaminophen (Tylenol) 325 mg tablet, Take 2 tablets (650 mg) by mouth every 4 hours if needed for mild  "pain (1 - 3)., Disp: , Rfl:   •  amLODIPine (Norvasc) 10 mg tablet, Take 1 tablet (10 mg) by mouth once daily., Disp: 90 tablet, Rfl: 1  •  aspirin 81 mg chewable tablet, Chew 1 tablet (81 mg) once daily., Disp: 90 tablet, Rfl: 0  •  atorvastatin (Lipitor) 40 mg tablet, Take 1 tablet (40 mg) by mouth once daily., Disp: 90 tablet, Rfl: 0  •  b complex vitamins capsule, Take 1 capsule by mouth once daily., Disp: , Rfl:   •  blood-glucose sensor (FreeStyle Remington 3 Sensor) device, Use to monitor blood glucose. Change sensor every 14 days., Disp: 2 each, Rfl: 11  •  cholecalciferol (Vitamin D-3) 50 mcg (2,000 unit) capsule, Take 1 capsule (50 mcg) by mouth once daily., Disp: 90 capsule, Rfl: 0  •  cloNIDine (Catapres) 0.1 mg tablet, Take 1 tablet (0.1 mg) by mouth 2 times a day., Disp: 180 tablet, Rfl: 0  •  folic acid (Folvite) 1 mg tablet, Take 1 tablet (1 mg) by mouth once daily., Disp: , Rfl:   •  FreeStyle Remington 3 [place] misc, Use as instructed to monitor blood glucose., Disp: 1 each, Rfl: 0  •  gabapentin (Neurontin) 300 mg capsule, TAKE ONE CAPSULE BY MOUTH TWO TIMES A DAY, Disp: 180 capsule, Rfl: 0  •  insulin glargine (Basaglar KwikPen U-100 Insulin) 100 unit/mL (3 mL) pen, Inject 18 Units under the skin once daily at bedtime. Take as directed per insulin instructions., Disp: 18 mL, Rfl: 1  •  insulin lispro (HumaLOG) 100 unit/mL injection, Inject 0.1 mL (10 Units) under the skin 3 times a day with meals., Disp: 10 mL, Rfl: 0  •  meclizine (Antivert) 25 mg tablet,chewable, Chew 1 tablet (25 mg) 3 times a day as needed (vertigo)., Disp: 90 tablet, Rfl: 1  •  OneTouch Delica Plus Lancet 30 gauge misc, USE TO TEST BLOOD SUGAR AS DIRECTED, Disp: 200 each, Rfl: 0  •  OneTouch Ultra Test strip, Use 1 strip BID to check glucose, Disp: 200 strip, Rfl: 1  •  OneTouch Ultra2 Meter misc, use 1 to 2 times daily, Disp: 1 each, Rfl: 0  •  pen needle, diabetic 32 gauge x 5/32\" needle, Use four times a day with insulin use, " "Disp: 360 each, Rfl: 1  •  tirzepatide (Mounjaro) 2.5 mg/0.5 mL pen injector, Inject 2.5 mg under the skin every 7 days. (Patient not taking: Reported on 7/19/2024), Disp: 2 mL, Rfl: 0      Allergies as of 07/19/2024   • (No Known Allergies)     Past Medical History:   Diagnosis Date   • Personal history of other diseases of the circulatory system     History of hypertension   • Personal history of other endocrine, nutritional and metabolic disease     History of hyperlipidemia   • Personal history of other endocrine, nutritional and metabolic disease     History of diabetes mellitus        Past Surgical History:   Procedure Laterality Date   • CT ANGIO NECK  1/25/2023    CT NECK ANGIO W AND WO IV CONTRAST 1/25/2023 DOCTOR OFFICE [facility]GAC[facility]   • CT HEAD ANGIO W AND WO IV CONTRAST  1/25/2023    CT HEAD ANGIO W AND WO IV CONTRAST 1/25/2023 DOCTOR OFFICE LEGAC[facility]   • OTHER SURGICAL HISTORY  10/21/2020    Toe amputation          Review of Systems  Cardiology: Lightheadedness-denies.  Chest pain-denies.  Leg edema-admits.  Palpitations-denies.  Respiratory: Cough-denies. Shortness of breath-denies.  Wheezing-denies.  Gastroenterology: Constipation-chronic.  Diarrhea-denies.  Heartburn-denies.  Endocrinology: Cold intolerance-denies.  Heat intolerance-denies.  Sweats-denies.  Neurology: Headache-denies.  Tremor-denies.  Neuropathy in extremities-denies.  Psychology: Low energy-denies.  Irritability-denies.  Sleep disturbances-denies.      BP (!) 190/100   Pulse 75   Ht 1.575 m (5' 2\")   Wt 102 kg (224 lb)   BMI 40.97 kg/m²       Labs:  Lab Results   Component Value Date    WBC 9.7 04/24/2024    NRBC 0.0 04/24/2024    RBC 2.10 (L) 04/24/2024    HGB 6.2 (LL) 04/24/2024    HCT 19.9 (L) 04/24/2024     (H) 04/24/2024     Lab Results   Component Value Date    CALCIUM 8.3 (L) 04/24/2024    AST 22 04/15/2024    ALKPHOS 120 (H) 04/15/2024    BILITOT 0.4 04/15/2024    PROT 6.9 04/15/2024    ALBUMIN 2.6 (L) 04/24/2024     " 04/24/2024    K 5.0 04/24/2024     04/24/2024    CO2 24 04/24/2024    ANIONGAP 14 04/24/2024    BUN 39 (H) 04/24/2024    CREATININE 1.93 (H) 04/24/2024    UREACREAUR 6.1 01/29/2024    GLUCOSE 139 (H) 04/24/2024    ALT 23 04/15/2024    EGFR 30 (L) 04/24/2024     Lab Results   Component Value Date    CHOL 174 05/08/2023    TRIG 189 (H) 05/08/2023    HDL 38.5 (A) 05/08/2023     Lab Results   Component Value Date    MICROALBCREA 998.6 (H) 08/24/2022     Lab Results   Component Value Date    TSH 0.46 02/03/2024     Lab Results   Component Value Date    [id number] 582 01/18/2024     Lab Results   Component Value Date    HGBA1C 11.2 (A) 07/19/2024         Physical Exam  General Appearance: pleasant, cooperative, no acute distress  HEENT: no chemosis, no proptosis, no lid lag, no lid retraction  Neck: no lymphadenopathy, no thyromegaly, no dominant thyroid nodules  Heart: no murmurs, regular rate and rhythm, S1 and S2  Lungs: no wheezes, no rhonci, no rales  Extremities: no lower extremity swelling      Assessment/Plan  1. 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   The CGM device is/will be used to lower the risk of hypoglycemia.  The CGM device is/will be used to adjust insulin dosing based on glucose data, food intake, activity and glucose trends  Patient is adherent to diabetic treatment plan and participates in ongoing  education and follow up.  Patient is motivated and knowledgeable about use of continuous glucose monitor.     2. Diabetic autonomic neuropathy associated with type 2 diabetes mellitus (Multi)  -Neurontin    3. Mixed hyperlipidemia  -tolerates statin  -LDL target < 70    4. Charcot's joint, right ankle and foot  -will be seeing podiatry    5. Benign essential hypertension  -uncontrolled  -did not take meds yet today  -stressed to need to have better control of BS risk for CVA/MI and worsening kidney function  -recommended ER with any CP or SOB or BP not decreasing     Follow Up: 4 weeks PharmD Educator    -labs/tests/notes reviewed  -reviewed and counseled patient on medication monitoring and side effects  Medical Decision Making  Complexity of problem: Chronic illness of diabetes mellitus uncontrolled, progressing  Data analyzed and reviewed: Reviewed prior notes, blood glucose data, labs including HgbA1c, lipids, serum chemistries.  Ordered tests.   Risk of complications and morbidities: Is definite because of use of insulin and risk of hypoglycemia.  Prescription medications reviewed and modifications made.  Compliance assessed.  Addressed social determinants of health including food insecurity.

## 2024-07-19 NOTE — PROGRESS NOTES
HPI   Presents as new patient/metabolic management 60 yo with DM Type 2 + neuropathy + retinopathy, nephropathy, diagnosed mid 40's History Uncontrolled HTN, HLD Obesity, Left femur fracture/repair 04/2024, PAD, CKD, Anemia. Current A1C% 11.2% (Hgb 7.6 ?) was 11.9%. Patient testing sugars times day. No lows. Following carb controlled diet somewhat and does not really know reasonable carb allowances. Patient not able to afford all their medications. Patient is not exercising.    -CGM Remington 3 with reader. Currently on insulin checking BS at least 4 xs a day adjustments made based on readings/frequent visits to manage.  -INSULIN Basaglar 30 units or 15 if BS < 150? Humalog 18 units each meal Basal 30 units with instruction on how to increase prandial base 10 +2 50>150  -GLP1/GIP Mounjaro did not  yet SGLT2 consider at FU ? renal function   -HTN Amlodipine Clonidine has not taken medications today   -STATIN Atorvastatin 40 mg LDL    04/2024 Creatinine 1.93 GFR 30 labs from hospital admission  05/2024 Rehab [facility] results reviewed Creatinine 1.65 GFR 36    Has been seeing Clinical Pharmacist Telehealth  Cannot upload CGM, data reviewed dariusz in target 3 % no lows, otherwise above target      Current Outpatient Medications:     acetaminophen (Tylenol) 325 mg tablet, Take 2 tablets (650 mg) by mouth every 4 hours if needed for mild pain (1 - 3)., Disp: , Rfl:     amLODIPine (Norvasc) 10 mg tablet, Take 1 tablet (10 mg) by mouth once daily., Disp: 90 tablet, Rfl: 1    aspirin 81 mg chewable tablet, Chew 1 tablet (81 mg) once daily., Disp: 90 tablet, Rfl: 0    atorvastatin (Lipitor) 40 mg tablet, Take 1 tablet (40 mg) by mouth once daily., Disp: 90 tablet, Rfl: 0    b complex vitamins capsule, Take 1 capsule by mouth once daily., Disp: , Rfl:     blood-glucose sensor (Easy[employer]Style Remington 3 Sensor) device, Use to monitor blood glucose. Change sensor every 14 days., Disp: 2 each, Rfl: 11    cholecalciferol (Vitamin D-3) 50 mcg  "(2,000 unit) capsule, Take 1 capsule (50 mcg) by mouth once daily., Disp: 90 capsule, Rfl: 0    cloNIDine (Catapres) 0.1 mg tablet, Take 1 tablet (0.1 mg) by mouth 2 times a day., Disp: 180 tablet, Rfl: 0    folic acid (Folvite) 1 mg tablet, Take 1 tablet (1 mg) by mouth once daily., Disp: , Rfl:     FreeStyle Remington 3 [place] misc, Use as instructed to monitor blood glucose., Disp: 1 each, Rfl: 0    gabapentin (Neurontin) 300 mg capsule, TAKE ONE CAPSULE BY MOUTH TWO TIMES A DAY, Disp: 180 capsule, Rfl: 0    insulin glargine (Basaglar KwikPen U-100 Insulin) 100 unit/mL (3 mL) pen, Inject 18 Units under the skin once daily at bedtime. Take as directed per insulin instructions., Disp: 18 mL, Rfl: 1    insulin lispro (HumaLOG) 100 unit/mL injection, Inject 0.1 mL (10 Units) under the skin 3 times a day with meals., Disp: 10 mL, Rfl: 0    meclizine (Antivert) 25 mg tablet,chewable, Chew 1 tablet (25 mg) 3 times a day as needed (vertigo)., Disp: 90 tablet, Rfl: 1    OneTouch Delica Plus Lancet 30 gauge misc, USE TO TEST BLOOD SUGAR AS DIRECTED, Disp: 200 each, Rfl: 0    OneTouch Ultra Test strip, Use 1 strip BID to check glucose, Disp: 200 strip, Rfl: 1    OneTouch Ultra2 Meter misc, use 1 to 2 times daily, Disp: 1 each, Rfl: 0    pen needle, diabetic 32 gauge x 5/32\" needle, Use four times a day with insulin use, Disp: 360 each, Rfl: 1    tirzepatide (Mounjaro) 2.5 mg/0.5 mL pen injector, Inject 2.5 mg under the skin every 7 days. (Patient not taking: Reported on 7/19/2024), Disp: 2 mL, Rfl: 0      Allergies as of 07/19/2024    (No Known Allergies)     Past Medical History:   Diagnosis Date    Personal history of other diseases of the circulatory system     History of hypertension    Personal history of other endocrine, nutritional and metabolic disease     History of hyperlipidemia    Personal history of other endocrine, nutritional and metabolic disease     History of diabetes mellitus        Past Surgical History: " "  Procedure Laterality Date    CT ANGIO NECK  1/25/2023    CT NECK ANGIO W AND WO IV CONTRAST 1/25/2023 [facility]    CT HEAD ANGIO W AND WO IV CONTRAST  1/25/2023    CT HEAD ANGIO W AND WO IV CONTRAST 1/25/2023 [facility] OFF[facility]    OTHER SURGICAL HISTORY  10/21/2020    Toe amputation          Review of Systems   Cardiology: Lightheadedness-denies.  Chest pain-denies.  Leg edema-admits.  Palpitations-denies.  Respiratory: Cough-denies. Shortness of breath-denies.  Wheezing-denies.  Gastroenterology: Constipation-chronic.  Diarrhea-denies.  Heartburn-denies.  Endocrinology: Cold intolerance-denies.  Heat intolerance-denies.  Sweats-denies.  Neurology: Headache-denies.  Tremor-denies.  Neuropathy in extremities-denies.  Psychology: Low energy-denies.  Irritability-denies.  Sleep disturbances-denies.      BP (!) 190/100   Pulse 75   Ht 1.575 m (5' 2\")   Wt 102 kg (224 lb)   BMI 40.97 kg/m²       Labs:  Lab Results   Component Value Date    WBC 9.7 04/24/2024    NRBC 0.0 04/24/2024    RBC 2.10 (L) 04/24/2024    HGB 6.2 (LL) 04/24/2024    HCT 19.9 (L) 04/24/2024     (H) 04/24/2024     Lab Results   Component Value Date    CALCIUM 8.3 (L) 04/24/2024    AST 22 04/15/2024    ALKPHOS 120 (H) 04/15/2024    BILITOT 0.4 04/15/2024    PROT 6.9 04/15/2024    ALBUMIN 2.6 (L) 04/24/2024     04/24/2024    K 5.0 04/24/2024     04/24/2024    CO2 24 04/24/2024    ANIONGAP 14 04/24/2024    BUN 39 (H) 04/24/2024    CREATININE 1.93 (H) 04/24/2024    UREACREAUR 6.1 01/29/2024    GLUCOSE 139 (H) 04/24/2024    ALT 23 04/15/2024    EGFR 30 (L) 04/24/2024     Lab Results   Component Value Date    CHOL 174 05/08/2023    TRIG 189 (H) 05/08/2023    HDL 38.5 (A) 05/08/2023     Lab Results   Component Value Date    MICROALBCREA 998.6 (H) 08/24/2022     Lab Results   Component Value Date    TSH 0.46 02/03/2024     Lab Results   Component Value Date    [id number] 582 01/18/2024     Lab Results   Component Value " Date    HGBA1C 11.2 (A) 07/19/2024         Physical Exam   General Appearance: pleasant, cooperative, no acute distress  HEENT: no chemosis, no proptosis, no lid lag, no lid retraction  Neck: no lymphadenopathy, no thyromegaly, no dominant thyroid nodules  Heart: no murmurs, regular rate and rhythm, S1 and S2  Lungs: no wheezes, no rhonci, no rales  Extremities: no lower extremity swelling      Assessment/Plan   1. 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   The CGM device is/will be used to lower the risk of hypoglycemia.  The CGM device is/will be used to adjust insulin dosing based on glucose data, food intake, activity and glucose trends  Patient is adherent to diabetic treatment plan and participates in ongoing education and follow up.  Patient is motivated and knowledgeable about use of continuous glucose monitor.     2. Diabetic autonomic neuropathy associated with type 2 diabetes mellitus (Multi)  -Neurontin    3. Mixed hyperlipidemia  -tolerates statin  -LDL target < 70    4. Charcot's joint, right ankle and foot  -will be seeing podiatry    5. Benign essential hypertension  -uncontrolled  -did not take meds yet today  -stressed to need to have better control of BS risk for CVA/MI and worsening kidney function  -recommended ER with any CP or SOB or BP not decreasing     Follow Up: 4 weeks PharmD  Educator    -labs/tests/notes reviewed  -reviewed and counseled patient on medication monitoring and side effects  Medical Decision Making  Complexity of problem: Chronic illness of diabetes mellitus uncontrolled, progressing  Data analyzed and reviewed: Reviewed prior notes, blood glucose data, labs including HgbA1c, lipids, serum chemistries.  Ordered tests.   Risk of complications and morbidities: Is definite because of use of insulin and risk of hypoglycemia.  Prescription medications reviewed and modifications made.  Compliance assessed.  Addressed social determinants of health including food insecurity.

## 2024-07-20 RX ORDER — INSULIN LISPRO 100 [IU]/ML
18 INJECTION, SOLUTION INTRAVENOUS; SUBCUTANEOUS
Qty: 17 EACH | Refills: 0 | Status: SHIPPED | OUTPATIENT
Start: 2024-07-20

## 2024-07-20 RX ORDER — GABAPENTIN 300 MG/1
300 CAPSULE ORAL 2 TIMES DAILY
Qty: 180 CAPSULE | Refills: 0 | Status: SHIPPED | OUTPATIENT
Start: 2024-07-20

## 2024-07-20 RX ORDER — TIRZEPATIDE 2.5 MG/.5ML
2.5 INJECTION, SOLUTION SUBCUTANEOUS
Qty: 2 ML | Refills: 0 | Status: SHIPPED | OUTPATIENT
Start: 2024-07-20

## 2024-07-26 ENCOUNTER — TELEPHONE (OUTPATIENT)
Dept: PRIMARY CARE | Facility: CLINIC | Age: 59
End: 2024-07-26
Payer: COMMERCIAL

## 2024-07-26 NOTE — TELEPHONE ENCOUNTER
Renee a  from the [facility] called requesting an verbal order for the patient to  the [facility] services.     [facility] # [phone]

## 2024-07-31 ENCOUNTER — EVALUATION (OUTPATIENT)
Dept: PHYSICAL THERAPY | Facility: CLINIC | Age: 59
End: 2024-07-31
Payer: COMMERCIAL

## 2024-07-31 DIAGNOSIS — S72.92XA CLOSED FRACTURE OF LEFT FEMUR, UNSPECIFIED FRACTURE MORPHOLOGY, UNSPECIFIED PORTION OF FEMUR, INITIAL ENCOUNTER (MULTI): ICD-10-CM

## 2024-07-31 PROCEDURE — 97110 THERAPEUTIC EXERCISES: CPT | Mod: GP | Performed by: PHYSICAL THERAPIST

## 2024-07-31 PROCEDURE — 97161 PT EVAL LOW COMPLEX 20 MIN: CPT | Mod: GP | Performed by: PHYSICAL THERAPIST

## 2024-07-31 ASSESSMENT — ENCOUNTER SYMPTOMS
LOSS OF SENSATION IN FEET: 1
DEPRESSION: 0
OCCASIONAL FEELINGS OF UNSTEADINESS: 1

## 2024-07-31 NOTE — PROGRESS NOTES
Physical Therapy    Physical Therapy Evaluation and Treatment      Patient Name: Nuris Squires  MRN: [id number]  Today's Date: 7/31/2024  Time Entry:   Time Calculation  Start Time: 0100  Stop Time: 0140  Time Calculation (min): 40 min  PT Evaluation Time Entry  PT Evaluation (Low) Time Entry: 30  PT Therapeutic Procedures Time Entry  Therapeutic Exercise Time Entry: 10  Visit: 1  Insurance: Reviewed  Physician: Mone Aquino  Problem List Items Addressed This Visit             ICD-10-CM    Closed fracture of left femur (Multi) S72.92XA    Relevant Orders    Follow Up In Physical Therapy        Assessment: Patient seen in PT for Initial Evaluation for knee pain and stiffness s/p orif of left femur.   Patient presents with postural deviation  decreased knee ROM , decreased knee strength, knee tenderness, swelling, gait deviation, and decreased balance.  Functionally, patient  unable to walk distances - doing only light ADL's.  Patient rates LEFS at 40%.    PT Assessment  Rehab Prognosis: Good  Evaluation/Treatment Tolerance: Patient tolerated treatment well     Plan:  Continue with POC  Scifit stepper, knee ROM/strength, passive knee ROM by PT/mobs, CKC, PRE, GT walker > cane, CP, balance  OP PT Plan  PT Plan: Skilled PT  PT Frequency: 1 time per week  Duration: 8  Onset Date: 06/25/24  Rehab Potential: Good  Plan of Care Agreement: Patient    Current Problem:   1. Closed fracture of left femur, unspecified fracture morphology, unspecified portion of femur, initial encounter (Multi)  Referral to Physical Therapy    Follow Up In Physical Therapy          Subjective    General: Patient with a history of left femur fx  on 4/15/24 after she fell  - tripped.  Went to er - taken by ambulance.  Patient had left knee orif on 4/20/24.  No complication.  WBAT left leg.  Patient treated with [facility] PT.  Patient complains of pain in the region of the ant knee, ache - burning pain, 4-5/10 at worst last 7 days.  Patient's pain is worse  with leg movement/ROM, standing walking.  Functionally, patient is unable to take short walks, light ADL's.     Precautions: DM, fall risk      Prior Level of Function: No limits       Objective     Postural deviation: increased valgus left knee  left knee ROM to -2 extension and 88 flexion  left Knee strength 3+/5 quads and 3+/5 hamstrings  Gait deviation: walks with rolling walker with trunk flexed - decreased heel strike on left  Tender to palpation at the retropatellar region left  Balance: 1 sec SLS bilaterally  Special Tests: negative Haris's  Min-mod left knee swelling  Well healing incision    Outcome Measures:  Other Measures  Lower Extremity Funtional Score (LEFS): 40%     Treatments:  Patient instructed in HEP including: step stretch, seated aa knee flexion, and supine heel slide with strap on theraball 20X each (10 minutes)    EDUCATION: HEP       Goals:  Active       PT Problem       PT Goal 1       Start:  07/31/24    Expected End:  10/29/24       Knee pain 3/10 or less         PT Goal 2       Start:  07/31/24    Expected End:  10/29/24       Improve LEFS by 25%         PT Goal 3       Start:  07/31/24    Expected End:  10/29/24       Normal gait with cane for functional distances         PT Goal 4       Start:  07/31/24    Expected End:  10/29/24       4-5/5 knee strength          PT Goal 5       Start:  07/31/24    Expected End:  10/29/24       0-110 left knee ROM

## 2024-08-06 ENCOUNTER — APPOINTMENT (OUTPATIENT)
Dept: PHARMACY | Facility: HOSPITAL | Age: 59
End: 2024-08-06
Payer: COMMERCIAL

## 2024-08-06 DIAGNOSIS — E11.3511 TYPE 2 DIABETES MELLITUS WITH RIGHT EYE AFFECTED BY PROLIFERATIVE RETINOPATHY AND MACULAR EDEMA, WITH LONG-TERM CURRENT USE OF INSULIN (MULTI): ICD-10-CM

## 2024-08-06 DIAGNOSIS — Z79.4 TYPE 2 DIABETES MELLITUS WITH RIGHT EYE AFFECTED BY PROLIFERATIVE RETINOPATHY AND MACULAR EDEMA, WITH LONG-TERM CURRENT USE OF INSULIN (MULTI): ICD-10-CM

## 2024-08-06 NOTE — ASSESSMENT & PLAN NOTE
Patient's A1c is 11.2% on 7/19/24. Goal A1c <7%.  Patient's SMBGs were again unable to be reviewed, as patient not wearing sensor and reader unavailable. Patient recalls recent PPBG 200's-300's and FBG ranging 's.  Rationale for plan: Patient recently had appt with endo. Per last endo note, prandial insulin was to be used as sliding scale. Patient instead started adding 2 units to basal dose when BG >250 in the evening. Due to accurate BG readings unavailable and confusion with insulin instructions, plan to follow-up in 2 weeks to review CGM data and provide detailed written insulin instructions at that time. Patient to provide financial documentation for [employer] PAP application to assist with cost of Mounjaro and future addition of SGLT2i.    Medication Changes:  CONTINUE  Basaglar 30 units once daily  BG >250 add 2 units  Admelog 18 units with meals     Additional Instructions  Patient to monitor BG closely with Remington 3 and be prepared to review reports with reader at follow-up appointment.  Patient will require detailed written instructions for insulin dosing, confirm understanding with teachback.  Patient to provide financial documentation for [employer] P[employer] as soon as possible in order to begin utilizing Mounjaro and allow start of SGLT2i in the future.
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
## 2024-08-06 NOTE — Clinical Note
FYI - Patient does not appear to be using insulin as intended at your last appt. Has follow-up scheduled 8/13/24.

## 2024-08-13 ENCOUNTER — APPOINTMENT (OUTPATIENT)
Dept: ENDOCRINOLOGY | Facility: CLINIC | Age: 59
End: 2024-08-13
Payer: COMMERCIAL

## 2024-08-16 ENCOUNTER — TREATMENT (OUTPATIENT)
Dept: PHYSICAL THERAPY | Facility: CLINIC | Age: 59
End: 2024-08-16
Payer: COMMERCIAL

## 2024-08-16 DIAGNOSIS — S72.92XA CLOSED FRACTURE OF LEFT FEMUR, UNSPECIFIED FRACTURE MORPHOLOGY, UNSPECIFIED PORTION OF FEMUR, INITIAL ENCOUNTER (MULTI): Primary | ICD-10-CM

## 2024-08-16 PROCEDURE — 97110 THERAPEUTIC EXERCISES: CPT | Mod: GP,CQ

## 2024-08-16 ASSESSMENT — PAIN - FUNCTIONAL ASSESSMENT: PAIN_FUNCTIONAL_ASSESSMENT: 0-10

## 2024-08-16 ASSESSMENT — PAIN SCALES - GENERAL: PAINLEVEL_OUTOF10: 4

## 2024-08-16 NOTE — PROGRESS NOTES
Physical Therapy    Physical Therapy Treatment    Patient Name: Nuris Squires  MRN: [id number]  Today's Date: 8/16/2024  Time Entry:   Time Calculation  Start Time: 0910  Stop Time: 0950  Time Calculation (min): 40 min     PT Therapeutic Procedures Time Entry  Therapeutic Exercise Time Entry: 40                 Visit: 2  Insurance: Reviewed  Physician: Mone Aquino    Assessment:  PT Assessment  Evaluation/Treatment Tolerance: Patient tolerated treatment well  Tolerated this treatment well. Tolerable pain when stretching into end range of flexion during step stretches and heel slides. Remaining therex completed without complaints of pain, hip adduction was the next most challenging. Calf raises difficult, possible alter to seated with dumbbell on knee for next visit. Provided print out of bridges, calf raises, heel slides and quad sets.     Plan:   Continue with POC  Scifit stepper, knee ROM/strength, passive knee ROM by PT/mobs, CKC, PRE, GT walker > cane, CP, balance  OP PT Plan  PT Plan: Skilled PT  PT Frequency: 1 time per week  Duration: 8  Onset Date: 06/25/24  Rehab Potential: Good  Plan of Care Agreement: Patient    Current Problem  1. Closed fracture of left femur, unspecified fracture morphology, unspecified portion of femur, initial encounter (Multi)        General        Subjective  arrived to original time 10 minutes late  A little pain today, 4-5/10  Exercises have been completed twice daily since evaluation  I feel week  No other updates at this time    Pain  Pain Assessment  Pain Assessment: 0-10  0-10 (Numeric) Pain Score: 4    Objective     Treatments:  Therapeutic Exercise  Therapeutic Exercise Performed: Yes  Stepper x 5 min  Lunges on step 15 x 5 sec  Calf raise x 20  Heel slides with strap 15 x 5 sec  Quad sets 15 x 5 sec  Mini bridge x 15  Hip adduction 15 x 5 sec  Hip abduction x 20 RTB    OP EDUCATION:     Goals:  Active       PT Problem       PT Goal 1       Start:  07/31/24    Expected End:   10/29/24       Knee pain 3/10 or less         PT Goal 2       Start:  07/31/24    Expected End:  10/29/24       Improve LEFS by 25%         PT Goal 3       Start:  07/31/24    Expected End:  10/29/24       Normal gait with cane for functional distances         PT Goal 4       Start:  07/31/24    Expected End:  10/29/24       4-5/5 knee strength          PT Goal 5       Start:  07/31/24    Expected End:  10/29/24       0-110 left knee ROM

## 2024-08-20 ENCOUNTER — APPOINTMENT (OUTPATIENT)
Dept: PHARMACY | Facility: HOSPITAL | Age: 59
End: 2024-08-20
Payer: COMMERCIAL

## 2024-08-20 DIAGNOSIS — Z79.4 TYPE 2 DIABETES MELLITUS WITH RIGHT EYE AFFECTED BY PROLIFERATIVE RETINOPATHY AND MACULAR EDEMA, WITH LONG-TERM CURRENT USE OF INSULIN (MULTI): ICD-10-CM

## 2024-08-20 DIAGNOSIS — E11.3511 TYPE 2 DIABETES MELLITUS WITH RIGHT EYE AFFECTED BY PROLIFERATIVE RETINOPATHY AND MACULAR EDEMA, WITH LONG-TERM CURRENT USE OF INSULIN (MULTI): ICD-10-CM

## 2024-08-20 RX ORDER — INSULIN LISPRO 100 [IU]/ML
INJECTION, SOLUTION INTRAVENOUS; SUBCUTANEOUS
Qty: 15 ML | Refills: 1 | Status: SHIPPED | OUTPATIENT
Start: 2024-08-20

## 2024-08-20 RX ORDER — INSULIN GLARGINE 100 [IU]/ML
32 INJECTION, SOLUTION SUBCUTANEOUS EVERY MORNING
Qty: 15 ML | Refills: 1 | Status: SHIPPED | OUTPATIENT
Start: 2024-08-20 | End: 2025-03-08

## 2024-08-20 NOTE — ASSESSMENT & PLAN NOTE
Patient's A1c is 11.2% on 7/19/24. Goal A1c <7%.  Patient's SMBGs are above goal, Remington shows 14-day Avg  and TIR 20%.     Rationale for plan: Patient BG remain elevated, will likely achieve better control with basal + prandial sliding scale regimen. She was previously instructed to do this, but confused instructions and took additional basal instead. Discussed updated insulin instructions in detail with patient. Patient wrote down instructions and confirmed understanding with teachback. Follow-up in 2-3 weeks to assess.    Medication Changes:  CHANGE  Basaglar 32 units every morning  Take this amount regardless of morning BG readings  Humalog - take according to sliding scale three times daily before a meal     Blood Glucose Humalog Units   < 80 0   81 - 150 12   151 - 200 14   201 - 250 16   252 - 300 18   301 - 350 20   351 - 400 22   > 400 24 + call provider     Additional Instructions  Patient still to provide financial documentation for  PAP as soon as possible in order to begin utilizing Mounjaro and allow start of SGLT2i in the future.

## 2024-08-20 NOTE — PATIENT INSTRUCTIONS
NAME: Nuris Squires   DATE: 8/20/2024     Your Pharmacist Today: Amanda Joe, PharmD  Your Referring Provider: Mone Aquino MD    Thank you for your time today, Nuris. It was a pleasure managing your health together! Below is a summary of your treatment plan, other important information, and our pharmacy team's contact numbers:  If you need to schedule or re-schedule an appointment, please call our scheduling line at [phone], option 1.  If you are out of medication refills or have a medical question, please contact me at my direct line, [phone]. Please allow for up to 48 hours for a return call. You can also contact me through [employer] Â®.    DIAGNOSIS:   1. 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, with long-term current use of insulin (Multi)  Follow Up In Advanced Primary Care - Pharmacy    insulin glargine (Basaglar KwikPen U-100 Insulin) 100 unit/mL (3 mL) pen    insulin lispro (Admelog SoloStar U-100 Insulin) 100 unit/mL injection    Follow Up In Advanced [facility] - Pharmacy          TREATMENT PLAN     Medication Changes:  Basaglar 32 units every morning  Take this amount regardless of morning BG readings  Humalog - take according to sliding scale three times daily before a meal     Blood Glucose Humalog Units   < 80 0   81 - 150 12   151 - 200 14   201 - 250 16   252 - 300 18   301 - 350 20   351 - 400 22   > 400 24 + call provider     Additional Instructions  Please provide your tax form 1040 as soon as possible for enrollment in the  Patient Assistance Program. This will allow us to reduce the cost of your medications and start using a weekly injectable to improve your blood glucose.    Follow-up Appointment:   Follow-up with me on 9/10/24 at 12:30pm, Telehealth visit.    IMPORTANT INFORMATION     Please call 911 for medical emergencies.  Our pharmacy team is generally available from Monday-Friday, 8 am - 5 pm.  If you need  to get in touch with me, you may either call me at my direct line [phone], or you can use [employer].  If you are unable to make your appointment, kindly call your our pharmacy scheduling line at [phone] at least 48 hours in advance to cancel and reschedule.  There are no supporting services by the pharmacy team on weekends and holidays, or after 5 PM on weekdays.
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
## 2024-08-22 ENCOUNTER — TELEPHONE (OUTPATIENT)
Dept: PRIMARY CARE | Facility: CLINIC | Age: 59
End: 2024-08-22
Payer: COMMERCIAL

## 2024-08-22 DIAGNOSIS — E11.3511 TYPE 2 DIABETES MELLITUS WITH RIGHT EYE AFFECTED BY PROLIFERATIVE RETINOPATHY AND MACULAR EDEMA, WITH LONG-TERM CURRENT USE OF INSULIN (MULTI): Primary | ICD-10-CM

## 2024-08-22 DIAGNOSIS — Z79.4 TYPE 2 DIABETES MELLITUS WITH RIGHT EYE AFFECTED BY PROLIFERATIVE RETINOPATHY AND MACULAR EDEMA, WITH LONG-TERM CURRENT USE OF INSULIN (MULTI): Primary | ICD-10-CM

## 2024-08-22 NOTE — TELEPHONE ENCOUNTER
Dyan from [facility] notice Nuris has upcoming appt in Sept and would like for her to be tested for Creatinine, albumin urine and hemoglobin A1C on this visit     If you have any questions, Dyan can be reached at [phone]

## 2024-08-23 ENCOUNTER — DOCUMENTATION (OUTPATIENT)
Dept: PHYSICAL THERAPY | Facility: CLINIC | Age: 59
End: 2024-08-23

## 2024-08-28 ENCOUNTER — TELEPHONE (OUTPATIENT)
Dept: PHARMACY | Facility: HOSPITAL | Age: 59
End: 2024-08-28
Payer: COMMERCIAL

## 2024-08-28 NOTE — TELEPHONE ENCOUNTER
Clinical Pharmacist Consult  Nuris Squires is a 59 y.o. female referred to pharmacy for management of No chief complaint on file..  Referring Provider: Mone Aquino MD    Patient called to report recent evening hypoglycemia. Experienced 2 instances of BG 60-70 with shakiness.    Average Glucose   7-day 14-day   Average 202 226   Overnight (12am - 6am) 197 233   Morning (6am - 12pm) 193 264   Afternoon (12pm - 6pm) 228 234   Evening (6pm-12am) 191 190     Current regimen  Basaglar 30 units every morning  Humalog - take according to sliding scale three times daily before a meal  Blood Glucose Humalog Units   < 80 0   81 - 150 12   151 - 200 14   201 - 250 16   252 - 300 18   301 - 350 20   351 - 400 22   > 400 24 + call provider       Medication Changes:  CONTINUE  Basaglar 30 units every morning  CHANGE  Humalog - take according to sliding scale three times daily before a meal                Blood Glucose Humalog Units   < 80 0   81 - 150 10   151 - 200 12   201 - 250 14   252 - 300 16   301 - 350 18   351 - 400 20   > 400 22 + call provider       Amanda Joe, PharmD  Clinical Pharmacist  [phone]    Continue all meds under the continuation of care with the referring provider and clinical pharmacy team.  Verbal consent to manage patient's drug therapy was obtained from the patient. They were informed they may decline to participate or withdraw from participation in pharmacy services at any time.

## 2024-08-30 ENCOUNTER — TREATMENT (OUTPATIENT)
Dept: PHYSICAL THERAPY | Facility: CLINIC | Age: 59
End: 2024-08-30
Payer: COMMERCIAL

## 2024-08-30 DIAGNOSIS — S72.92XA CLOSED FRACTURE OF LEFT FEMUR, UNSPECIFIED FRACTURE MORPHOLOGY, UNSPECIFIED PORTION OF FEMUR, INITIAL ENCOUNTER (MULTI): Primary | ICD-10-CM

## 2024-08-30 PROCEDURE — 97110 THERAPEUTIC EXERCISES: CPT | Mod: GP,CQ

## 2024-08-30 ASSESSMENT — PAIN - FUNCTIONAL ASSESSMENT: PAIN_FUNCTIONAL_ASSESSMENT: 0-10

## 2024-08-30 ASSESSMENT — PAIN SCALES - GENERAL: PAINLEVEL_OUTOF10: 5 - MODERATE PAIN

## 2024-08-30 NOTE — PROGRESS NOTES
Physical Therapy    Physical Therapy Treatment    Patient Name: Nuris Squires  MRN: [id number]  Today's Date: 8/30/2024  Time Entry:   Time Calculation  Start Time: 0900  Stop Time: 0945  Time Calculation (min): 45 min     PT Therapeutic Procedures Time Entry  Therapeutic Exercise Time Entry: 45                 Visit: 3  Insurance: Reviewed  Physician: Mone Aquino    Assessment:  PT Assessment  Evaluation/Treatment Tolerance: Patient tolerated treatment well  Tolerated session well. No reports of increased pain today. Increased warm up time on stepper. Progressed strengthening with sit to stands, moderate cueing to maintain proper form and 50/50 effort between legs. Standing exercises in // bars were challenging, required rest breaks as needed. Finishes session feeling well.     Plan:   Continue with POC  Scifit stepper, knee ROM/strength, passive knee ROM by PT/mobs, CKC, PRE, GT walker > cane, CP, balance  OP PT Plan  PT Plan: Skilled PT  PT Frequency: 1 time per week  Duration: 8  Onset Date: 06/25/24  Rehab Potential: Good  Plan of Care Agreement: Patient    Current Problem  1. Closed fracture of left femur, unspecified fracture morphology, unspecified portion of femur, initial encounter (Multi)          General        Subjective    Doing okay this mornings  Some pain in the left knee around a 5/10, the leg feels heavy  Last week I missed because my blood sugar was acting up, I haven't felt better until around yesterday  HEP not completed in the past week    Pain  Pain Assessment  Pain Assessment: 0-10  0-10 (Numeric) Pain Score: 5 - Moderate pain    Objective     Treatments:  Therapeutic Exercise  Therapeutic Exercise Performed: Yes  Stepper x 7 min  Lunges on step 15 x 5 sec  Calf raise x 15  Alternating march in // bars x 15 each leg  Lateral walking in // bars x 3 D/B  Edge of large mat table sit to stands to walker x 10  Mini bridge x 15  Hip adduction 15 x 5 sec  Hip abduction x 20 RTB  Heel slides with  strap 15 x 5 sec    OP EDUCATION:     Goals:  Active       PT Problem       PT Goal 1       Start:  07/31/24    Expected End:  10/29/24       Knee pain 3/10 or less         PT Goal 2       Start:  07/31/24    Expected End:  10/29/24       Improve LEFS by 25%         PT Goal 3       Start:  07/31/24    Expected End:  10/29/24       Normal gait with cane for functional distances         PT Goal 4       Start:  07/31/24    Expected End:  10/29/24       4-5/5 knee strength          PT Goal 5       Start:  07/31/24    Expected End:  10/29/24       0-110 left knee ROM

## 2024-08-30 NOTE — PROGRESS NOTES
Physical Therapy                 Therapy Communication Note    Patient Name: Nuris Squires  MRN: [id number]  Today's Date: 8/30/2024     Discipline: Physical Therapy    Missed Visit Reason:  no showed    Missed Time: No Show    Comment:

## 2024-09-04 ENCOUNTER — APPOINTMENT (OUTPATIENT)
Dept: PRIMARY CARE | Facility: CLINIC | Age: 59
End: 2024-09-04
Payer: COMMERCIAL

## 2024-09-04 VITALS
WEIGHT: 224 LBS | TEMPERATURE: 97.6 F | HEART RATE: 77 BPM | BODY MASS INDEX: 41.22 KG/M2 | DIASTOLIC BLOOD PRESSURE: 78 MMHG | OXYGEN SATURATION: 98 % | SYSTOLIC BLOOD PRESSURE: 124 MMHG | HEIGHT: 62 IN

## 2024-09-04 DIAGNOSIS — R06.2 WHEEZING: ICD-10-CM

## 2024-09-04 DIAGNOSIS — E11.3511 TYPE 2 DIABETES MELLITUS WITH RIGHT EYE AFFECTED BY PROLIFERATIVE RETINOPATHY AND MACULAR EDEMA, WITH LONG-TERM CURRENT USE OF INSULIN (MULTI): Primary | ICD-10-CM

## 2024-09-04 DIAGNOSIS — J96.01 ACUTE RESPIRATORY FAILURE WITH HYPOXIA (MULTI): ICD-10-CM

## 2024-09-04 DIAGNOSIS — E66.01 CLASS 3 SEVERE OBESITY WITH SERIOUS COMORBIDITY AND BODY MASS INDEX (BMI) OF 40.0 TO 44.9 IN ADULT, UNSPECIFIED OBESITY TYPE (MULTI): ICD-10-CM

## 2024-09-04 DIAGNOSIS — M54.42 CHRONIC LEFT-SIDED LOW BACK PAIN WITH LEFT-SIDED SCIATICA: ICD-10-CM

## 2024-09-04 DIAGNOSIS — G89.29 CHRONIC LEFT-SIDED LOW BACK PAIN WITH LEFT-SIDED SCIATICA: ICD-10-CM

## 2024-09-04 DIAGNOSIS — Z79.4 TYPE 2 DIABETES MELLITUS WITH RIGHT EYE AFFECTED BY PROLIFERATIVE RETINOPATHY AND MACULAR EDEMA, WITH LONG-TERM CURRENT USE OF INSULIN (MULTI): Primary | ICD-10-CM

## 2024-09-04 PROCEDURE — 3078F DIAST BP <80 MM HG: CPT | Performed by: FAMILY MEDICINE

## 2024-09-04 PROCEDURE — 99214 OFFICE O/P EST MOD 30 MIN: CPT | Performed by: FAMILY MEDICINE

## 2024-09-04 PROCEDURE — 3060F POS MICROALBUMINURIA REV: CPT | Performed by: FAMILY MEDICINE

## 2024-09-04 PROCEDURE — 3046F HEMOGLOBIN A1C LEVEL >9.0%: CPT | Performed by: FAMILY MEDICINE

## 2024-09-04 PROCEDURE — 3008F BODY MASS INDEX DOCD: CPT | Performed by: FAMILY MEDICINE

## 2024-09-04 PROCEDURE — 3074F SYST BP LT 130 MM HG: CPT | Performed by: FAMILY MEDICINE

## 2024-09-04 RX ORDER — ALBUTEROL SULFATE 90 UG/1
2 INHALANT RESPIRATORY (INHALATION) EVERY 6 HOURS PRN
Qty: 6.7 G | Refills: 0 | Status: SHIPPED | OUTPATIENT
Start: 2024-09-04 | End: 2024-09-05

## 2024-09-04 ASSESSMENT — ENCOUNTER SYMPTOMS
DEPRESSION: 0
OCCASIONAL FEELINGS OF UNSTEADINESS: 1
LOSS OF SENSATION IN FEET: 1

## 2024-09-04 ASSESSMENT — PAIN SCALES - GENERAL: PAINLEVEL: 2

## 2024-09-04 NOTE — PROGRESS NOTES
"Subjective   Patient ID: uNris Squires is a 59 y.o. female who presents for Fatigue, Hypoglycemia (69~ range), and Wheezing.  Here for follow up on diabetes, and recent wheezing.    Working with  pharmacy on diabetes.  Brought paperwork for [employer] assistance program.  Has had episodes of glucose is dropping to 69.  Recently had medication changes of taking basaglar in AM instead of PM.  Also reduced SSI amounts.  In AM, glucose is 120-150  Only eats  once a day, just not not hungry.  Last A1C done 7/19/24, was 11.2.    Had some wheezing last night.  Had some mid chest discomfort, tightness.  Has had for past couple nights.  Doesn't feel like she has a cold.  No fever or RN.  Last night was a little SOB with the wheezing.  No inhaler, no history of asthma  Currently feeling back to normal.    Patient also would like to get rolator to make travel outside the home easier.  She relies on walker and/or wheelchair.  Has wheelchair, too big and bulky to be able to travel with.  Too heavy to take out.  Needs walking support due to balance issues.  Need one with seat and basket  Order for rolator needs faxed to [facility] at [phone]            Review of Systems    Objective   /78 (BP Location: Left arm, Patient Position: Sitting, BP Cuff Size: Large adult)   Pulse 77   Temp 36.4 °C (97.6 °F) (Temporal)   Ht 1.575 m (5' 2\")   Wt 102 kg (224 lb)   SpO2 98%   BMI 40.97 kg/m²     Physical Exam  Vitals reviewed.   Constitutional:       Appearance: Normal appearance.   Cardiovascular:      Rate and Rhythm: Normal rate and regular rhythm.   Pulmonary:      Effort: Pulmonary effort is normal.      Breath sounds: Normal breath sounds.   Musculoskeletal:      Right lower leg: No edema.      Left lower leg: No edema.   Lymphadenopathy:      Cervical: No cervical adenopathy.   Neurological:      Mental Status: She is alert.           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

## 2024-09-05 VITALS
TEMPERATURE: 97.5 F | HEART RATE: 91 BPM | BODY MASS INDEX: 40.48 KG/M2 | SYSTOLIC BLOOD PRESSURE: 193 MMHG | RESPIRATION RATE: 17 BRPM | OXYGEN SATURATION: 95 % | DIASTOLIC BLOOD PRESSURE: 84 MMHG | WEIGHT: 220 LBS | HEIGHT: 62 IN

## 2024-09-05 LAB — GLUCOSE BLD MANUAL STRIP-MCNC: 141 MG/DL (ref 74–99)

## 2024-09-05 PROCEDURE — 82947 ASSAY GLUCOSE BLOOD QUANT: CPT

## 2024-09-05 PROCEDURE — 99283 EMERGENCY DEPT VISIT LOW MDM: CPT

## 2024-09-05 PROCEDURE — 93010 ELECTROCARDIOGRAM REPORT: CPT | Performed by: STUDENT IN AN ORGANIZED HEALTH CARE EDUCATION/TRAINING PROGRAM

## 2024-09-05 PROCEDURE — 99284 EMERGENCY DEPT VISIT MOD MDM: CPT | Performed by: STUDENT IN AN ORGANIZED HEALTH CARE EDUCATION/TRAINING PROGRAM

## 2024-09-05 RX ORDER — ALBUTEROL SULFATE 90 UG/1
2 INHALANT RESPIRATORY (INHALATION) EVERY 6 HOURS PRN
Qty: 6.7 G | Refills: 0 | Status: SHIPPED | OUTPATIENT
Start: 2024-09-05 | End: 2025-09-05

## 2024-09-05 ASSESSMENT — PAIN SCALES - GENERAL: PAINLEVEL_OUTOF10: 0 - NO PAIN

## 2024-09-05 ASSESSMENT — COLUMBIA-SUICIDE SEVERITY RATING SCALE - C-SSRS
6. HAVE YOU EVER DONE ANYTHING, STARTED TO DO ANYTHING, OR PREPARED TO DO ANYTHING TO END YOUR LIFE?: NO
1. IN THE PAST MONTH, HAVE YOU WISHED YOU WERE DEAD OR WISHED YOU COULD GO TO SLEEP AND NOT WAKE UP?: NO
2. HAVE YOU ACTUALLY HAD ANY THOUGHTS OF KILLING YOURSELF?: NO

## 2024-09-05 ASSESSMENT — PAIN - FUNCTIONAL ASSESSMENT: PAIN_FUNCTIONAL_ASSESSMENT: 0-10

## 2024-09-05 NOTE — PATIENT INSTRUCTIONS
Diabetes with poor control, wide fluctuation of glucose.  Working with  pharmacy to adjust medications.  Recently Basaglar dosing changed to AM, and SSI doses decreased.  Forms brought in to complete for [employer] patient assistance program.  Refer to nutritionist to help with meal planning.  Refer to endocrinologist.    Wheezing noted past couple nights, without other symptoms.  Lungs clear currently.  Albuterol inhaler prescribed to use as needed.  Follow up if continued or additional symptoms.    Difficulty with ambulation due to femur fracture.  Will order Rolator with seat and basket.     Order to be faxed to [facility] at [phone]

## 2024-09-06 ENCOUNTER — DOCUMENTATION (OUTPATIENT)
Dept: PHYSICAL THERAPY | Facility: CLINIC | Age: 59
End: 2024-09-06
Payer: COMMERCIAL

## 2024-09-06 ENCOUNTER — CLINICAL SUPPORT (OUTPATIENT)
Dept: EMERGENCY MEDICINE | Facility: HOSPITAL | Age: 59
End: 2024-09-06
Payer: COMMERCIAL

## 2024-09-06 ENCOUNTER — PATIENT OUTREACH (OUTPATIENT)
Dept: CARE COORDINATION | Facility: CLINIC | Age: 59
End: 2024-09-06
Payer: COMMERCIAL

## 2024-09-06 ENCOUNTER — HOSPITAL ENCOUNTER (EMERGENCY)
Facility: HOSPITAL | Age: 59
Discharge: HOME | End: 2024-09-06
Attending: STUDENT IN AN ORGANIZED HEALTH CARE EDUCATION/TRAINING PROGRAM
Payer: COMMERCIAL

## 2024-09-06 ENCOUNTER — APPOINTMENT (OUTPATIENT)
Dept: RADIOLOGY | Facility: HOSPITAL | Age: 59
End: 2024-09-06
Payer: COMMERCIAL

## 2024-09-06 DIAGNOSIS — E11.649 HYPOGLYCEMIA ASSOCIATED WITH TYPE 2 DIABETES MELLITUS (MULTI): Primary | ICD-10-CM

## 2024-09-06 DIAGNOSIS — N39.0 URINARY TRACT INFECTION WITHOUT HEMATURIA, SITE UNSPECIFIED: ICD-10-CM

## 2024-09-06 LAB
ALBUMIN SERPL BCP-MCNC: 3.3 G/DL (ref 3.4–5)
ALP SERPL-CCNC: 123 U/L (ref 33–136)
ALT SERPL W P-5'-P-CCNC: 10 U/L (ref 7–45)
ANION GAP SERPL CALC-SCNC: 12 MMOL/L (ref 10–20)
APPEARANCE UR: CLEAR
AST SERPL W P-5'-P-CCNC: 11 U/L (ref 9–39)
BASOPHILS # BLD AUTO: 0.05 X10*3/UL (ref 0–0.1)
BASOPHILS NFR BLD AUTO: 0.7 %
BILIRUB SERPL-MCNC: 0.4 MG/DL (ref 0–1.2)
BILIRUB UR STRIP.AUTO-MCNC: NEGATIVE MG/DL
BUN SERPL-MCNC: 29 MG/DL (ref 6–23)
CALCIUM SERPL-MCNC: 8.8 MG/DL (ref 8.6–10.6)
CHLORIDE SERPL-SCNC: 108 MMOL/L (ref 98–107)
CO2 SERPL-SCNC: 27 MMOL/L (ref 21–32)
COLOR UR: ABNORMAL
CREAT SERPL-MCNC: 1.68 MG/DL (ref 0.5–1.05)
EGFRCR SERPLBLD CKD-EPI 2021: 35 ML/MIN/1.73M*2
EOSINOPHIL # BLD AUTO: 0.23 X10*3/UL (ref 0–0.7)
EOSINOPHIL NFR BLD AUTO: 3.1 %
ERYTHROCYTE [DISTWIDTH] IN BLOOD BY AUTOMATED COUNT: 14.1 % (ref 11.5–14.5)
GLUCOSE SERPL-MCNC: 109 MG/DL (ref 74–99)
GLUCOSE UR STRIP.AUTO-MCNC: ABNORMAL MG/DL
HCT VFR BLD AUTO: 31.9 % (ref 36–46)
HGB BLD-MCNC: 10.3 G/DL (ref 12–16)
HOLD SPECIMEN: NORMAL
IMM GRANULOCYTES # BLD AUTO: 0.02 X10*3/UL (ref 0–0.7)
IMM GRANULOCYTES NFR BLD AUTO: 0.3 % (ref 0–0.9)
KETONES UR STRIP.AUTO-MCNC: NEGATIVE MG/DL
LEUKOCYTE ESTERASE UR QL STRIP.AUTO: ABNORMAL
LYMPHOCYTES # BLD AUTO: 2.68 X10*3/UL (ref 1.2–4.8)
LYMPHOCYTES NFR BLD AUTO: 36.3 %
MCH RBC QN AUTO: 28.4 PG (ref 26–34)
MCHC RBC AUTO-ENTMCNC: 32.3 G/DL (ref 32–36)
MCV RBC AUTO: 88 FL (ref 80–100)
MONOCYTES # BLD AUTO: 0.48 X10*3/UL (ref 0.1–1)
MONOCYTES NFR BLD AUTO: 6.5 %
NEUTROPHILS # BLD AUTO: 3.93 X10*3/UL (ref 1.2–7.7)
NEUTROPHILS NFR BLD AUTO: 53.1 %
NITRITE UR QL STRIP.AUTO: ABNORMAL
NRBC BLD-RTO: 0 /100 WBCS (ref 0–0)
PH UR STRIP.AUTO: 6 [PH]
PLATELET # BLD AUTO: 318 X10*3/UL (ref 150–450)
POTASSIUM SERPL-SCNC: 4.1 MMOL/L (ref 3.5–5.3)
PROT SERPL-MCNC: 7.3 G/DL (ref 6.4–8.2)
PROT UR STRIP.AUTO-MCNC: ABNORMAL MG/DL
RBC # BLD AUTO: 3.63 X10*6/UL (ref 4–5.2)
RBC # UR STRIP.AUTO: ABNORMAL /UL
RBC #/AREA URNS AUTO: ABNORMAL /HPF
SARS-COV-2 RNA RESP QL NAA+PROBE: NOT DETECTED
SODIUM SERPL-SCNC: 143 MMOL/L (ref 136–145)
SP GR UR STRIP.AUTO: 1.01
SQUAMOUS #/AREA URNS AUTO: ABNORMAL /HPF
UROBILINOGEN UR STRIP.AUTO-MCNC: NORMAL MG/DL
WBC # BLD AUTO: 7.4 X10*3/UL (ref 4.4–11.3)
WBC #/AREA URNS AUTO: ABNORMAL /HPF

## 2024-09-06 PROCEDURE — 71046 X-RAY EXAM CHEST 2 VIEWS: CPT

## 2024-09-06 PROCEDURE — 85025 COMPLETE CBC W/AUTO DIFF WBC: CPT | Performed by: STUDENT IN AN ORGANIZED HEALTH CARE EDUCATION/TRAINING PROGRAM

## 2024-09-06 PROCEDURE — 81001 URINALYSIS AUTO W/SCOPE: CPT

## 2024-09-06 PROCEDURE — 87086 URINE CULTURE/COLONY COUNT: CPT

## 2024-09-06 PROCEDURE — 80053 COMPREHEN METABOLIC PANEL: CPT | Performed by: STUDENT IN AN ORGANIZED HEALTH CARE EDUCATION/TRAINING PROGRAM

## 2024-09-06 PROCEDURE — 93005 ELECTROCARDIOGRAM TRACING: CPT

## 2024-09-06 PROCEDURE — 87635 SARS-COV-2 COVID-19 AMP PRB: CPT

## 2024-09-06 PROCEDURE — 36415 COLL VENOUS BLD VENIPUNCTURE: CPT | Performed by: STUDENT IN AN ORGANIZED HEALTH CARE EDUCATION/TRAINING PROGRAM

## 2024-09-06 PROCEDURE — 71046 X-RAY EXAM CHEST 2 VIEWS: CPT | Mod: FOREIGN READ | Performed by: RADIOLOGY

## 2024-09-06 RX ORDER — CEFDINIR 300 MG/1
600 CAPSULE ORAL ONCE
Status: DISCONTINUED | OUTPATIENT
Start: 2024-09-06 | End: 2024-09-06

## 2024-09-06 RX ORDER — CEFDINIR 300 MG/1
600 CAPSULE ORAL 2 TIMES DAILY
Qty: 40 CAPSULE | Refills: 0 | Status: SHIPPED | OUTPATIENT
Start: 2024-09-06 | End: 2024-09-16

## 2024-09-06 NOTE — ED PROVIDER NOTES
Emergency Department Provider Note        History of Present Illness     History provided by: Patient  Limitations to History: None  External Records Reviewed with Brief Summary: Outpatient progress note from 9/4/2024 which showed visit with PCP with similar complaints, no workup completed as patient was at baseline    HPI:  Nuris Squires is a 59 y.o. female with a history of type 2 diabetes who presents with 4 days of fatigue, cough, intermittent shortness of breath with wheezing, and blood sugars 67-70 overnight.  She denies fevers and chills, chest pain, abdominal pain, nausea, diarrhea and constipation.  She does endorse some urinary urgency and frequency, however no pain.  She denies sick contacts.    Physical Exam   Triage vitals:  T 36.4 °C (97.5 °F)  HR 91  BP (!) 193/84  RR 17  O2 95 % None (Room air)    Physical Exam  Constitutional:       General: She is not in acute distress.     Appearance: She is not ill-appearing.   Eyes:      Extraocular Movements: Extraocular movements intact.      Conjunctiva/sclera: Conjunctivae normal.   Cardiovascular:      Rate and Rhythm: Normal rate and regular rhythm.      Heart sounds: Normal heart sounds.   Pulmonary:      Effort: Pulmonary effort is normal.      Breath sounds: Decreased breath sounds present. No wheezing.   Abdominal:      General: There is no distension.      Palpations: Abdomen is soft. There is no mass.      Tenderness: There is abdominal tenderness in the suprapubic area. There is no right CVA tenderness, left CVA tenderness, guarding or rebound.      Hernia: No hernia is present.   Musculoskeletal:         General: No tenderness. Normal range of motion.      Cervical back: Normal range of motion.      Right lower leg: Edema present.      Left lower leg: Edema present.   Skin:     General: Skin is warm and dry.   Neurological:      General: No focal deficit present.      Mental Status: She is alert and oriented to person, place, and time.    Psychiatric:         Mood and Affect: Mood normal.         Behavior: Behavior normal.          Medical Decision Making & ED Course   Medical Decision Makin y.o. female with a history of type 2 diabetes who presents with 4 days of nonspecific feeling unwell, blood sugars lower than normal at night for her, mild intermittent wheezing and shortness of breath, and urinary urgency and frequency.  Blood glucose 141 on arrival. Infectious cause of hypoglycemia considered, including UTI and pneumonia.  On examination, lung sounds were diminished but no wheezes appreciated.  Chest x-ray revealed bilateral small pleural fluid at the bases and cardiomegaly with mild vascular congestion. No pneumonia noted.    In addition to reported urinary symptoms, exam revealed tenderness in the suprapubic region.  Urinalysis consistent with urinary tract infection. The patient was treated with oral antibiotics. She endorsed understanding of discharge instructions and return precautions.    ----      Differential diagnoses considered include but are not limited to: Urinary tract infection, pneumonia, COVID, COPD     Social Determinants of Health which Significantly Impact Care: None identified     EKG Independent Interpretation: The EKG obtained at 0333 was independently interpreted by myself. It demonstrates sinus rhythm with a ventricular rate of 81. Normal axis. Intervals normal. ST segments showed no signs of ischemia. Improved compared to prior EKG from 4/15/2024.    Independent Result Review and Interpretation: Relevant laboratory and radiographic results were reviewed and independently interpreted by myself.  As necessary, they are commented on in the ED Course.    Chronic conditions affecting the patient's care: As documented above in [facility]    The patient was discussed with the following consultants/services: None    Care Considerations: As documented above in [facility]    ED Course:  ED Course as of 24 0338   Fri Sep 06, 2024    0153 Coronavirus 2019, PCR: Not Detected [MG]   0225 CBC and Auto Differential(!)  Mild anemia, improved from previous, WBCs normal [MG]   0252 Comprehensive metabolic panel(!)  stable [MG]   0305 Urinalysis with Reflex Culture and Microscopic(!)  UTI [MG]   0317 XR chest 2 views  Bilateral small pleural fluid at the bases.  Cardiomegaly with mild  vascular congestion.   [MG]   0337 XR chest 2 views [CHAPIN]      ED Course User Index  [CHAPIN] Francesca Buenrostro MD  [MG] Chio Dumont MD         Diagnoses as of 09/06/24 0338   Hypoglycemia associated with type 2 diabetes mellitus (Multi)   Urinary tract infection without hematuria, site unspecified     Disposition   As a result of the work-up, the patient was discharged home.  she was informed of her diagnosis and instructed to come back with any concerns or worsening of condition.  she and was agreeable to the plan as discussed above.  she was given the opportunity to ask questions.  All of the patient's questions were answered.        Patient seen and discussed with ED attending physician.    Chio Dumont MD  Emergency Medicine      Chio Dumont MD  Resident  09/06/24 0338

## 2024-09-06 NOTE — ED TRIAGE NOTES
Pt to ED with c/o feeling fatigued x 4 days. PMH Type2 DM on insulin and states her sugars have been dropping into the 60s at night but are fine during the day. Pt endorses some decrease appetite. Denies increase in urination. Denies cp, sob, abd pain.  in triage.

## 2024-09-06 NOTE — DISCHARGE INSTRUCTIONS
You were diagnosed with a urinary tract infection today. Please complete the entire course of antibiotics, even if you are feeling better. Please follow up with your primary care doctor and endocrinologist, and return to the emergency department if symptoms worsen, you develop fevers/chills/back pain, or as necessary.

## 2024-09-06 NOTE — PROGRESS NOTES
Left voicemail requesting return call regarding referral for outpatient DSME; name and contact info provided for return call to schedule appointment if interested in DSME service: call [phone]

## 2024-09-06 NOTE — PROGRESS NOTES
Physical Therapy                 Therapy Communication Note    Patient Name: Nuris Squires  MRN: [id number]  Today's Date: 9/6/2024     Discipline: Physical Therapy    Missed Visit Reason:  no showed, called patient, was in the emergency room last night and into this morning. Reminded of attendance policy and calling ahead of time to cancel.     Missed Time: No Show    Comment:

## 2024-09-08 LAB — BACTERIA UR CULT: ABNORMAL

## 2024-09-09 ENCOUNTER — TELEPHONE (OUTPATIENT)
Dept: PHARMACY | Facility: HOSPITAL | Age: 59
End: 2024-09-09
Payer: COMMERCIAL

## 2024-09-09 NOTE — PROGRESS NOTES
EDPD Note: Antibiotics Reviewed and Warranted    Contacted Mr./Mrs./Ms. Nuris Squires regarding a positive urine culture/result that was taken during their recent emergency room visit. I completed education with patient . The patient is being treated appropriately with Cefdinir. Patient was prescribed 2 caps (600mg) BID x10 days, but patient states [facility] pharmacist called prescriber for clarification and Cefdinir was adjusted to 300mg BID x10 days. Advised patient to finish full course of antibiotic and follow up with PCP or urgent care if symptoms do not completely resolve.      Susceptibility data from last 90 days.  Collected Specimen Info Organism Nitrofurantoin Oxacillin Trimethoprim/Sulfamethoxazole Vancomycin   09/06/24 Urine from Clean Catch/Voided Methicillin Susceptible Staphylococcus aureus (MSSA)  S  S  S  S        No further follow up needed from EDPD Team.     Savanah Garcia, PharmD

## 2024-09-10 ENCOUNTER — APPOINTMENT (OUTPATIENT)
Dept: PHARMACY | Facility: HOSPITAL | Age: 59
End: 2024-09-10
Payer: COMMERCIAL

## 2024-09-10 ENCOUNTER — PATIENT OUTREACH (OUTPATIENT)
Dept: CARE COORDINATION | Facility: CLINIC | Age: 59
End: 2024-09-10

## 2024-09-10 DIAGNOSIS — Z79.4 TYPE 2 DIABETES MELLITUS WITH RIGHT EYE AFFECTED BY PROLIFERATIVE RETINOPATHY AND MACULAR EDEMA, WITH LONG-TERM CURRENT USE OF INSULIN (MULTI): ICD-10-CM

## 2024-09-10 DIAGNOSIS — E11.3511 TYPE 2 DIABETES MELLITUS WITH RIGHT EYE AFFECTED BY PROLIFERATIVE RETINOPATHY AND MACULAR EDEMA, WITH LONG-TERM CURRENT USE OF INSULIN (MULTI): ICD-10-CM

## 2024-09-10 NOTE — PROGRESS NOTES
Clinical Pharmacy Appointment    Patient ID: Nuris Squires is a 59 y.o. female who presents for Type 2 Diabetes Mellitus.    Pt is here for Follow Up appointment.   Referring Provider: Mone Aquino MD  PCP: Mone Aquino MD, last visit: 9/4/24, next visit: 12/5/24    Subjective   HPI  PMH significant for T2DM, HTN, HLD, autonomic neuropathy, obesity, CKD, nephropathy.  Special needs/barriers to therapy: None identified    Medication System Management  Patients preferred pharmacy: [facility]  Adherence/Organization: No current concerns  Affordability/Accessibility:  Patient in process of gathering tax documents for PAP/VAF program.    Drug Interactions  No relevant drug interactions were noted.      DIABETES MELLITUS Type 2:    Known diabetic complications: nephropathy, retinopathy, autonomic neuropathy, chronic kidney disease, and obesity.  Hx or FH Hx of MTC/MEN2?: No, Pancreatitis?: No, Gastroparesis?: No, UTI/Yeast Infections?: No    Follows with Endocrinology?: Yes, referral placed 9/4/24  Diabetic Eye Exam: Following with optometry  Monofilament Foot Exam: Following with podiatry     Current diabetic medications include:  Basaglar 32 units once daily  Admelog TID with meals per sliding scale  Previous medications: Jardiance, Metformin     Adverse Effects: none    Glucose Readings:  Glucometer/CGM Type: FreeStyle Remington 3    Previous home BG readings:   Average Glucose    7-day 14-day   Average 253 278   Overnight (12am - 6am) 252 286   Morning (6am - 12pm) 312 327   Afternoon (12pm - 6pm) 252 273   Evening (6pm-12am) 197 229      Time in Target    7-day 14-day   Above (>180) 73% 80%   In Target () 27% 20%   Below (<70) 0% 0%     Current home BG readings:   Average Glucose    7-day 14-day   Average 143 157   Overnight (12am - 6am) 159 170   Morning (6am - 12pm) 154 181   Afternoon (12pm - 6pm) 138 150   Evening (6pm-12am) 116 126     Time in Target    7-day 14-day   Above (>180) 20% 30%   In  Target () 80% 70%   Below (<70) 0% 0%       Any episodes of hypoglycemia? No   Did patient treat episode of hypoglycemia appropriately? N/A  Does the patient have a prescription for ready-to-use Glucagon? N/A    Risk Reducing Medications:  Statin? Yes - Atorvastatin 80mg   ACE-I/ARB? No    Preventative Care  Immunizations Needed: Shingrix and Tdap  Tobacco Use: non-smoker    Objective   No Known Allergies  Social History     Social History Narrative    Not on file      Medication Review  Current Outpatient Medications   Medication Instructions    acetaminophen (TYLENOL) 650 mg, oral, Every 4 hours PRN    albuterol 90 mcg/actuation inhaler 2 puffs, inhalation, Every 6 hours PRN    amLODIPine (NORVASC) 10 mg, oral, Daily    aspirin 81 mg, oral, Daily RT    atorvastatin (LIPITOR) 40 mg, oral, Daily    b complex vitamins capsule 1 capsule, oral, Daily    Basaglar KwikPen U-100 Insulin 32 Units, subcutaneous, Every morning, Take as directed per insulin instructions.    blood-glucose sensor (FreeStyle Remington 3 Sensor) device Use to monitor blood glucose. Change sensor every 14 days.    cefdinir (OMNICEF) 600 mg, oral, 2 times daily    cholecalciferol (VITAMIN D-3) 50 mcg, oral, Daily RT    cloNIDine (CATAPRES) 0.1 mg, oral, 2 times daily    folic acid (Folvite) 1 mg tablet 1 tablet, oral, Daily    FreeStyle Remington 3 [place] misc Use as instructed to monitor blood glucose.    gabapentin (NEURONTIN) 300 mg, oral, 2 times daily    insulin lispro (Admelog SoloStar U-100 Insulin) 100 unit/mL injection Inject per sliding scale instructions under the skin 3 times a day with meals. (Max daily dose 70 units)    meclizine (ANTIVERT) 25 mg, oral, 3 times daily PRN    Mounjaro 2.5 mg, subcutaneous, Every 7 days    OneTouch Delica Plus Lancet 30 gauge misc USE TO TEST BLOOD SUGAR AS DIRECTED    OneTouch Ultra Test strip Use 1 strip BID to check glucose    OneTouch Ultra2 Meter misc use 1 to 2 times daily    pen needle, diabetic 32  "gauge x 5/32\" needle Use four times a day with insulin use      Vitals  BP Readings from Last 2 Encounters:   09/05/24 (!) 193/84   09/04/24 124/78     Labs  A1C  Lab Results   Component Value Date    HGBA1C 11.2 (A) 07/19/2024    HGBA1C 11.9 (H) 04/16/2024    HGBA1C 13.1 (H) 01/24/2024     BMP  Lab Results   Component Value Date    CALCIUM 8.8 09/06/2024     09/06/2024    K 4.1 09/06/2024    CO2 27 09/06/2024     (H) 09/06/2024    BUN 29 (H) 09/06/2024    CREATININE 1.68 (H) 09/06/2024    EGFR 35 (L) 09/06/2024     LFTs  Lab Results   Component Value Date    ALT 10 09/06/2024    AST 11 09/06/2024    ALKPHOS 123 09/06/2024    BILITOT 0.4 09/06/2024     FLP  Lab Results   Component Value Date    TRIG 189 (H) 05/08/2023    CHOL 174 05/08/2023    LDLF 98 05/08/2023    HDL 38.5 (A) 05/08/2023     Urine Microalbumin  Lab Results   Component Value Date    MICROALBCREA 998.6 (H) 08/24/2022     Weight Management  Wt Readings from Last 3 Encounters:   09/05/24 99.8 kg (220 lb)   09/04/24 102 kg (224 lb)   07/19/24 102 kg (224 lb)      There is no height or weight on file to calculate BMI.     Assessment/Plan     Patient's A1c is 11.2% on 7/19/24. Goal A1c <7%.    Rationale for plan: CGM blood glucose values are in target 70-80% of time, continue current medication regimen.    Medication Changes:  No medication changes    Patient Education:  All questions and concerns addressed. Contact pharmacist with any further questions or concerns prior to next appointment.  Reviewed CGM BG goals: Target range , Time in Range Goal >70%, GMI goal <7%    Clinical Pharmacist follow-up: 10/8/24 11:30AM, Telehealth visit    Thank You,  Ritu Dubon, PharmD  PGY-1 Pharmacy Resident    Continue all meds under the continuation of care with the referring provider and clinical pharmacy team.  Verbal consent to manage patient's drug therapy was obtained from the patient. They were informed they may decline to participate or " withdraw from participation in pharmacy services at any time.

## 2024-09-10 NOTE — PROGRESS NOTES
Lucy call today; eft voicemail requesting return call regarding referral for outpatient DSME; name and contact info provided for return call to schedule appointment if interested in DSME service: call [phone]     No response from multiple outreach attempts; referral closed at this time.  Educator remains available as needed; call  [phone] to schedule.

## 2024-09-13 ENCOUNTER — TREATMENT (OUTPATIENT)
Dept: PHYSICAL THERAPY | Facility: CLINIC | Age: 59
End: 2024-09-13
Payer: COMMERCIAL

## 2024-09-13 DIAGNOSIS — S72.92XA CLOSED FRACTURE OF LEFT FEMUR, UNSPECIFIED FRACTURE MORPHOLOGY, UNSPECIFIED PORTION OF FEMUR, INITIAL ENCOUNTER (MULTI): Primary | ICD-10-CM

## 2024-09-13 PROCEDURE — 97110 THERAPEUTIC EXERCISES: CPT | Mod: GP,CQ

## 2024-09-13 ASSESSMENT — PAIN SCALES - GENERAL: PAINLEVEL_OUTOF10: 6

## 2024-09-13 ASSESSMENT — PAIN - FUNCTIONAL ASSESSMENT: PAIN_FUNCTIONAL_ASSESSMENT: 0-10

## 2024-09-13 NOTE — PROGRESS NOTES
Physical Therapy    Physical Therapy Treatment    Patient Name: Nuris Squires  MRN: [id number]  Today's Date: 9/13/2024  Time Entry:   Time Calculation  Start Time: 1030  Stop Time: 1115  Time Calculation (min): 45 min     PT Therapeutic Procedures Time Entry  Therapeutic Exercise Time Entry: 45                 Visit: 4  Insurance: Reviewed  Physician: Mone Aquino    Assessment:  PT Assessment  Evaluation/Treatment Tolerance: Patient tolerated treatment well  Walker adjustments made, consistent cues through session for posture correction. // bar activities received well, responds positively to cues for posture/heel toe gait corrections; rest break after // bar activities. Sit to stands with cues on foot placement and not leaning against mat table, improved after cues; has progressed overall with ease of the exercise. Reviewed important HEP exercises and to increase compliance.     Plan:   Continue with POC  Scifit stepper, knee ROM/strength, passive knee ROM by PT/mobs, CKC, PRE, GT walker > cane, CP, balance  OP PT Plan  PT Plan: Skilled PT  PT Frequency: 1 time per week  Duration: 8  Onset Date: 06/25/24  Rehab Potential: Good  Plan of Care Agreement: Patient    Current Problem  1. Closed fracture of left femur, unspecified fracture morphology, unspecified portion of femur, initial encounter (Multi)        General        Subjective    Pain today in left knee is around a 6/10  Non compliant with HEP - recently was in hospital recently for sugar - did not perform hooklying/supine therex  No other updates    Pain  Pain Assessment  Pain Assessment: 0-10  0-10 (Numeric) Pain Score: 6    Objective     Treatments:  Therapeutic Exercise  Therapeutic Exercise Performed: Yes  Stepper x 7 min  Lunges on step 20 x 5 sec  Wedge calf stretch 3 x 30 sec  Calf raise x 15  Lateral walking in // bars support x 3 D/B  Forward walking in // bars support x 3 D/B  // bar walking with cane x 3 D/B  Edge of large mat table sit to stands  to walker x 10  EOB LAQ x 20 3 sec hold  EOB RTB ham curl x 15  Heel slides with strap 20 x 5 sec    OP EDUCATION:     Goals:  Active       PT Problem       PT Goal 1       Start:  07/31/24    Expected End:  10/29/24       Knee pain 3/10 or less         PT Goal 2       Start:  07/31/24    Expected End:  10/29/24       Improve LEFS by 25%         PT Goal 3       Start:  07/31/24    Expected End:  10/29/24       Normal gait with cane for functional distances         PT Goal 4       Start:  07/31/24    Expected End:  10/29/24       4-5/5 knee strength          PT Goal 5       Start:  07/31/24    Expected End:  10/29/24       0-110 left knee ROM

## 2024-09-17 LAB
ATRIAL RATE: 81 BPM
P AXIS: 66 DEGREES
P OFFSET: 209 MS
P ONSET: 159 MS
PR INTERVAL: 120 MS
Q ONSET: 219 MS
QRS COUNT: 13 BEATS
QRS DURATION: 86 MS
QT INTERVAL: 384 MS
QTC CALCULATION(BAZETT): 446 MS
QTC FREDERICIA: 424 MS
R AXIS: 104 DEGREES
T AXIS: 32 DEGREES
T OFFSET: 411 MS
VENTRICULAR RATE: 81 BPM

## 2024-09-19 DIAGNOSIS — E11.69 TYPE 2 DIABETES MELLITUS WITH OTHER SPECIFIED COMPLICATION: Primary | ICD-10-CM

## 2024-09-20 ENCOUNTER — TREATMENT (OUTPATIENT)
Dept: PHYSICAL THERAPY | Facility: CLINIC | Age: 59
End: 2024-09-20
Payer: COMMERCIAL

## 2024-09-20 DIAGNOSIS — S72.92XA CLOSED FRACTURE OF LEFT FEMUR, UNSPECIFIED FRACTURE MORPHOLOGY, UNSPECIFIED PORTION OF FEMUR, INITIAL ENCOUNTER: Primary | ICD-10-CM

## 2024-09-20 PROCEDURE — 97110 THERAPEUTIC EXERCISES: CPT | Mod: GP,CQ

## 2024-09-20 ASSESSMENT — PAIN SCALES - GENERAL: PAINLEVEL_OUTOF10: 6

## 2024-09-20 ASSESSMENT — PAIN - FUNCTIONAL ASSESSMENT: PAIN_FUNCTIONAL_ASSESSMENT: 0-10

## 2024-09-20 NOTE — PROGRESS NOTES
Physical Therapy    Physical Therapy Treatment    Patient Name: Nuris Squires  MRN: [id number]  Today's Date: 9/20/2024  Time Entry:   Time Calculation  Start Time: 0930  Stop Time: 1015  Time Calculation (min): 45 min     PT Therapeutic Procedures Time Entry  Therapeutic Exercise Time Entry: 45                 Visit: 5  Insurance: Reviewed  Physician: Mone Aquino    Assessment:  PT Assessment  Evaluation/Treatment Tolerance: Patient tolerated treatment well  Improved gait and sit to stand observed today. Moderate cuing needed for mini squatting. Strength has improved. Reviewed HEP and increasing focus on flexion stretching of the knee.     Plan:   Continue with POC  Scifit stepper, knee ROM/strength, passive knee ROM by PT/mobs, CKC, PRE, GT walker > cane, CP, balance  OP PT Plan  PT Plan: Skilled PT  PT Frequency: 1 time per week  Duration: 8  Onset Date: 06/25/24  Rehab Potential: Good  Plan of Care Agreement: Patient    Current Problem  1. Closed fracture of left femur, unspecified fracture morphology, unspecified portion of femur, initial encounter (Multi)        General        Subjective    Anterior left knee pain rated around a 6/10  Continues to practice walking at home, feels her gait has slightly imrpoved  Partial compliance of HEP  No other major updates    Pain  Pain Assessment  Pain Assessment: 0-10  0-10 (Numeric) Pain Score: 6    Objective     Treatments:  Therapeutic Exercise  Therapeutic Exercise Performed: Yes  Stepper x 7 min  Lunges on step 20 x 5 sec  Wedge calf stretch 3 x 30 sec with quad set  Calf raise x 15  Lateral walking in // bars support x 3 D/B  Forward walking in // bars support x 3 D/B  // bar mini squat x 10  Edge of large mat table sit to stands to walker x 10  // bar standing march x 15 each leg  Supine quad set 15 x 5 sec  EOB LAQ x 20 3 sec hold  EOB RTB ham curl x 15  Heel slides with strap 20 x 5 sec    OP EDUCATION:     Goals:  Active       PT Problem       PT Goal 1        Start:  07/31/24    Expected End:  10/29/24       Knee pain 3/10 or less         PT Goal 2       Start:  07/31/24    Expected End:  10/29/24       Improve LEFS by 25%         PT Goal 3       Start:  07/31/24    Expected End:  10/29/24       Normal gait with cane for functional distances         PT Goal 4       Start:  07/31/24    Expected End:  10/29/24       4-5/5 knee strength          PT Goal 5       Start:  07/31/24    Expected End:  10/29/24       0-110 left knee ROM

## 2024-10-03 ENCOUNTER — APPOINTMENT (OUTPATIENT)
Dept: RADIOLOGY | Facility: CLINIC | Age: 59
End: 2024-10-03
Payer: COMMERCIAL

## 2024-10-04 ENCOUNTER — DOCUMENTATION (OUTPATIENT)
Dept: PHYSICAL THERAPY | Facility: CLINIC | Age: 59
End: 2024-10-04
Payer: COMMERCIAL

## 2024-10-04 ENCOUNTER — APPOINTMENT (OUTPATIENT)
Dept: PHYSICAL THERAPY | Facility: CLINIC | Age: 59
End: 2024-10-04
Payer: COMMERCIAL

## 2024-10-04 NOTE — PROGRESS NOTES
Physical Therapy                 Therapy Communication Note    Patient Name: Nuris Squires  MRN: [id number]  Department:   Room: Room/bed info not found  Today's Date: 10/4/2024     Discipline: Physical Therapy    Missed Visit Reason:  canceled through , did not provide a reason.     Missed Time: Cancel    Comment:

## 2024-10-08 ENCOUNTER — APPOINTMENT (OUTPATIENT)
Dept: PHARMACY | Facility: HOSPITAL | Age: 59
End: 2024-10-08
Payer: COMMERCIAL

## 2024-10-08 DIAGNOSIS — Z79.4 TYPE 2 DIABETES MELLITUS WITH RIGHT EYE AFFECTED BY PROLIFERATIVE RETINOPATHY AND MACULAR EDEMA, WITH LONG-TERM CURRENT USE OF INSULIN: ICD-10-CM

## 2024-10-08 DIAGNOSIS — E11.3511 TYPE 2 DIABETES MELLITUS WITH RIGHT EYE AFFECTED BY PROLIFERATIVE RETINOPATHY AND MACULAR EDEMA, WITH LONG-TERM CURRENT USE OF INSULIN: ICD-10-CM

## 2024-10-08 PROBLEM — M25.569 KNEE PAIN: Status: ACTIVE | Noted: 2024-10-08

## 2024-10-08 PROBLEM — Z86.79 HISTORY OF HYPERTENSION: Status: ACTIVE | Noted: 2024-10-08

## 2024-10-08 PROBLEM — R10.31 RIGHT LOWER QUADRANT ABDOMINAL PAIN: Status: ACTIVE | Noted: 2024-10-08

## 2024-10-08 PROBLEM — E11.9 TYPE 2 DIABETES MELLITUS WITHOUT COMPLICATIONS (MULTI): Status: ACTIVE | Noted: 2024-04-25

## 2024-10-08 PROBLEM — I10 ESSENTIAL (PRIMARY) HYPERTENSION: Status: ACTIVE | Noted: 2024-04-25

## 2024-10-08 PROBLEM — Z11.3 SCREENING EXAMINATION FOR VENEREAL DISEASE: Status: ACTIVE | Noted: 2024-10-08

## 2024-10-08 PROBLEM — B37.31 CANDIDIASIS OF VAGINA: Status: ACTIVE | Noted: 2024-10-08

## 2024-10-08 PROBLEM — M14.60 CHARCOT'S ARTHROPATHY: Status: ACTIVE | Noted: 2024-10-08

## 2024-10-08 PROBLEM — Z86.39 HISTORY OF DIABETES MELLITUS: Status: ACTIVE | Noted: 2024-10-08

## 2024-10-08 PROBLEM — E66.01 MORBID OBESITY (MULTI): Status: ACTIVE | Noted: 2024-10-08

## 2024-10-08 PROBLEM — S72.8X2D: Status: ACTIVE | Noted: 2024-04-25

## 2024-10-08 PROBLEM — M62.81 MUSCLE WEAKNESS (GENERALIZED): Status: ACTIVE | Noted: 2024-04-26

## 2024-10-08 PROBLEM — Z90.81 ACQUIRED ABSENCE OF SPLEEN: Status: RESOLVED | Noted: 2019-04-02 | Resolved: 2024-10-08

## 2024-10-08 PROBLEM — R26.89 OTHER ABNORMALITIES OF GAIT AND MOBILITY: Status: ACTIVE | Noted: 2024-04-26

## 2024-10-08 PROBLEM — E66.9 OBESITY WITH BODY MASS INDEX 30 OR GREATER: Status: ACTIVE | Noted: 2024-10-08

## 2024-10-08 PROBLEM — M79.673 PAIN OF FOOT: Status: ACTIVE | Noted: 2024-10-08

## 2024-10-08 PROBLEM — E11.43 AUTONOMIC NEUROPATHY DUE TO TYPE 2 DIABETES MELLITUS (MULTI): Status: ACTIVE | Noted: 2024-10-08

## 2024-10-08 PROBLEM — Z86.39 HISTORY OF ELEVATED LIPIDS: Status: ACTIVE | Noted: 2024-10-08

## 2024-10-08 PROBLEM — S39.012A STRAIN OF LUMBAR REGION: Status: ACTIVE | Noted: 2024-10-08

## 2024-10-08 PROBLEM — E11.9 TYPE 2 DIABETES MELLITUS WITHOUT COMPLICATION (MULTI): Status: ACTIVE | Noted: 2024-10-08

## 2024-10-08 PROBLEM — I73.9 PERIPHERAL VASCULAR DISEASE (CMS-HCC): Status: ACTIVE | Noted: 2024-10-08

## 2024-10-08 NOTE — PROGRESS NOTES
Clinical Pharmacy Appointment    Patient ID: Nuris Squires is a 59 y.o. female who presents for Type 2 Diabetes Mellitus.    Pt is here for Follow Up appointment.   Referring Provider: Mone Aquino MD  PCP: Mone Aquino MD, last visit: 9/4/24, next visit: 12/5/24    Subjective   HPI  PMH significant for T2DM, HTN, HLD, autonomic neuropathy, obesity, CKD, nephropathy.  Special needs/barriers to therapy: None identified    Medication System Management  Patients preferred pharmacy: [facility]  Adherence/Organization: No current concerns  Affordability/Accessibility: patient sending 1040 forms for [employer]    Drug Interactions  No relevant drug interactions were noted.    DIABETES MELLITUS Type 2:    Known diabetic complications: nephropathy, retinopathy, autonomic neuropathy, chronic kidney disease, and obesity.  Hx or FH Hx of MTC/MEN2?: No,   Pancreatitis?: No,   Gastroparesis?: No,   UTI/Yeast Infections?: No    Follows with Endocrinology?: appointment scheduled 1/20/25  Diabetic Eye Exam: Following with optometry  Monofilament Foot Exam: Following with podiatry     Current diabetic medications include:  Basaglar 32 units once daily  Admelog with meals per sliding scale   <150 - 10 units  <250 - 14 units  <300 - 16 units  <350 - 18 units  <400 - 20 units  >400 - 22 units  Previous medications: Jardiance, Metformin    Clarifications to above regimen: only using Admelog once a day per patient (only eating one meal per day)  Adverse Effects: none    Glucose Readings:  Glucometer/CGM Type: FreeStyle Remington 3    Previous home BG readings: (9/10/24)  Average Glucose    7-day 14-day   Average 143 157   Overnight (12am - 6am) 159 170   Morning (6am - 12pm) 154 181   Afternoon (12pm - 6pm) 138 150   Evening (6pm-12am) 116 126      Time in Target    7-day 14-day   Above (>180) 20% 30%   In Target () 80% 70%   Below (<70) 0% 0%     Current home BG readings:   Review History --> Average Glucose (last option) --> Use  arrows on bottom to change # of days  Review History --> Scroll to next page --> Time in Target (2nd option) Use arrows on bottom to change # of days  Average Glucose   7-day 14-day 30-day   Average 202 184 194   Overnight (12am - 6am) 221 206 208   Morning (6am - 12pm) 218 209 226   Afternoon (12pm - 6pm) 197 174 194   Evening (6pm-12am) 173 146 150     Time in Target   7-day 14-day 30-day   Above (>180) 60% 47% 53%   In Target () 40% 53% 47%   Below (<70) 0% 0% 0%     Any episodes of hypoglycemia? No   Did patient treat episode of hypoglycemia appropriately? N/A  Does the patient have a prescription for ready-to-use Glucagon? N/A    Risk Reducing Medications:  Statin? Yes, Atorvastatin 40mg daily  ACE-I/ARB? No      Preventative Care  Immunizations Needed: Prevnar, Shingrix, and Tdap  Tobacco Use: non-smoker    Objective   No Known Allergies  Social History     Social History Narrative    Not on file      Medication Review  Current Outpatient Medications   Medication Instructions    acetaminophen (TYLENOL) 650 mg, oral, Every 4 hours PRN    albuterol 90 mcg/actuation inhaler 2 puffs, inhalation, Every 6 hours PRN    amLODIPine (NORVASC) 10 mg, oral, Daily    aspirin 81 mg, oral, Daily RT    atorvastatin (LIPITOR) 40 mg, oral, Daily    b complex vitamins capsule 1 capsule, oral, Daily    Basaglar KwikPen U-100 Insulin 32 Units, subcutaneous, Every morning, Take as directed per insulin instructions.    blood-glucose sensor (FreeStyle Remington 3 Sensor) device Use to monitor blood glucose. Change sensor every 14 days.    cholecalciferol (VITAMIN D-3) 50 mcg, oral, Daily RT    cloNIDine (CATAPRES) 0.1 mg, oral, 2 times daily    folic acid (Folvite) 1 mg tablet 1 tablet, oral, Daily    FreeStyle Remington 3 [place] misc Use as instructed to monitor blood glucose.    gabapentin (NEURONTIN) 300 mg, oral, 2 times daily    insulin lispro (Admelog SoloStar U-100 Insulin) 100 unit/mL injection Inject per sliding scale  "instructions under the skin 3 times a day with meals. (Max daily dose 70 units)    meclizine (ANTIVERT) 25 mg, oral, 3 times daily PRN    Mounjaro 2.5 mg, subcutaneous, Every 7 days    OneTouch Delica Plus Lancet 30 gauge misc USE TO TEST BLOOD SUGAR AS DIRECTED    OneTouch Ultra Test strip Use 1 strip BID to check glucose    OneTouch Ultra2 Meter misc use 1 to 2 times daily    pen needle, diabetic 32 gauge x 5/32\" needle Use four times a day with insulin use      Vitals  BP Readings from Last 2 Encounters:   09/05/24 (!) 193/84   09/04/24 124/78     Labs  A1C  Lab Results   Component Value Date    HGBA1C 11.2 (A) 07/19/2024    HGBA1C 11.9 (H) 04/16/2024    HGBA1C 13.1 (H) 01/24/2024     BMP  Lab Results   Component Value Date    CALCIUM 8.8 09/06/2024     09/06/2024    K 4.1 09/06/2024    CO2 27 09/06/2024     (H) 09/06/2024    BUN 29 (H) 09/06/2024    CREATININE 1.68 (H) 09/06/2024    EGFR 35 (L) 09/06/2024     LFTs  Lab Results   Component Value Date    ALT 10 09/06/2024    AST 11 09/06/2024    ALKPHOS 123 09/06/2024    BILITOT 0.4 09/06/2024     FLP  Lab Results   Component Value Date    TRIG 189 (H) 05/08/2023    CHOL 174 05/08/2023    LDLF 98 05/08/2023    HDL 38.5 (A) 05/08/2023     Urine Microalbumin  Lab Results   Component Value Date    MICROALBCREA 998.6 (H) 08/24/2022     Weight Management  Wt Readings from Last 3 Encounters:   09/05/24 99.8 kg (220 lb)   09/04/24 102 kg (224 lb)   07/19/24 102 kg (224 lb)      There is no height or weight on file to calculate BMI.     Assessment/Plan     DIABETES MELLITUS:   Patient's A1c is 11.2% on 7/19/24. Goal A1c <7%.  Patient's SMBGs are not controlled, with time in target: 7 day: 40%, 14 day: 53%, 30 day: 47%.     Rationale for plan: Patient's BG values have increased and time in target have decreased since last appointment. Patient reported no changes in her diet. Patient reported she is using 30 units of Basaglar daily. Plan to increase Basaglar to " 35 units once daily.     Patient emailing 1040 forms to enroll in [facility].     Medication Changes:  Increase: Basaglar to 35 units once daily     Patient Education:  Counseled patient on relevant MOA, expectations, side effects, duration of therapy, contraindications, administration, and monitoring parameters.  All questions and concerns addressed. Contact pharmacist with any further questions or concerns prior to next appointment.  Reviewed CGM BG goals: Target range , Time in Range Goal >70%, GMI goal <7%    Clinical Pharmacist follow-up: 10/29/24 12:00PM, Telehealth visit    Thank You,  Ritu Dubon, PharmD  PGY-1 Pharmacy Resident    Continue all meds under the continuation of care with the referring provider and clinical pharmacy team.  Verbal consent to manage patient's drug therapy was obtained from the patient. They were informed they may decline to participate or withdraw from participation in pharmacy services at any time.

## 2024-10-10 ENCOUNTER — HOSPITAL ENCOUNTER (OUTPATIENT)
Dept: RADIOLOGY | Facility: HOSPITAL | Age: 59
Discharge: HOME | End: 2024-10-10
Payer: COMMERCIAL

## 2024-10-10 ENCOUNTER — APPOINTMENT (OUTPATIENT)
Dept: ORTHOPEDIC SURGERY | Facility: HOSPITAL | Age: 59
End: 2024-10-10
Payer: COMMERCIAL

## 2024-10-10 DIAGNOSIS — S72.402D CLOSED FRACTURE OF DISTAL END OF LEFT FEMUR WITH ROUTINE HEALING, UNSPECIFIED FRACTURE MORPHOLOGY, SUBSEQUENT ENCOUNTER: Primary | ICD-10-CM

## 2024-10-10 DIAGNOSIS — S72.402D CLOSED FRACTURE OF DISTAL END OF LEFT FEMUR WITH ROUTINE HEALING, UNSPECIFIED FRACTURE MORPHOLOGY, SUBSEQUENT ENCOUNTER: ICD-10-CM

## 2024-10-10 PROCEDURE — 3060F POS MICROALBUMINURIA REV: CPT | Performed by: ORTHOPAEDIC SURGERY

## 2024-10-10 PROCEDURE — 99214 OFFICE O/P EST MOD 30 MIN: CPT | Performed by: ORTHOPAEDIC SURGERY

## 2024-10-10 PROCEDURE — 1036F TOBACCO NON-USER: CPT | Performed by: ORTHOPAEDIC SURGERY

## 2024-10-10 PROCEDURE — 73552 X-RAY EXAM OF FEMUR 2/>: CPT | Mod: LT

## 2024-10-10 PROCEDURE — 3046F HEMOGLOBIN A1C LEVEL >9.0%: CPT | Performed by: ORTHOPAEDIC SURGERY

## 2024-10-10 PROCEDURE — 73560 X-RAY EXAM OF KNEE 1 OR 2: CPT | Mod: LT

## 2024-10-10 RX ORDER — BIOTIN 5 MG
1 CAPSULE ORAL 2 TIMES DAILY
Qty: 120 CAPSULE | Refills: 1 | Status: SHIPPED | OUTPATIENT
Start: 2024-10-10 | End: 2024-12-09

## 2024-10-10 NOTE — PROGRESS NOTES
Nuris Squires is  post-op from intramedullary nail and ORIF left distal femur fracture on 4/16/2024.  she is doing well at this point.  Pain is well controlled  Denies fevers or chills.  Denies drainage from the wound.  she reports no additional symptoms or concerns. No shortness of breath or chest pain. No calf swelling or pain.    The patients full medical history, surgical history, medications, allergies, family, medical history, social history, and a complete 14 point review of systems is documented in the medical record on the signed, scanned medical intake sheet or reviewed in the history of present illness. Review of systems otherwise negative    Past Medical History:   Diagnosis Date    Personal history of other diseases of the circulatory system     History of hypertension    Personal history of other endocrine, nutritional and metabolic disease     History of hyperlipidemia    Personal history of other endocrine, nutritional and metabolic disease     History of diabetes mellitus       Medication Documentation Review Audit       Reviewed by Mone Aquino MD (Physician) on 09/04/24 at 1224      Medication Order Taking? Sig Documenting Provider Last Dose Status   acetaminophen (Tylenol) 325 mg tablet [id number] Yes Take 2 tablets (650 mg) by mouth every 4 hours if needed for mild pain (1 - 3). Celeste Hill, APRN-CNP Taking Active   amLODIPine (Norvasc) 10 mg tablet [id number] Yes Take 1 tablet (10 mg) by mouth once daily. Mone Aquino MD Taking Active   aspirin 81 mg chewable tablet [id number] Yes Chew 1 tablet (81 mg) once daily. Mone Aquino MD Taking Active   atorvastatin (Lipitor) 40 mg tablet [id number] Yes Take 1 tablet (40 mg) by mouth once daily. Mone Aquino MD Taking Active   b complex vitamins capsule [id number] Yes Take 1 capsule by mouth once daily. Historical Provider, MD Taking Active   blood-glucose sensor (FreeStyle Remington 3 Sensor) device [id number] Yes Use to monitor blood glucose.  "Change sensor every 14 days. Mone Aquino MD Taking Active   cholecalciferol (Vitamin D-3) 50 mcg (2,000 unit) capsule [id number] Yes Take 1 capsule (50 mcg) by mouth once daily. Mone Aquino MD Taking Active   cloNIDine (Catapres) 0.1 mg tablet [id number] Yes Take 1 tablet (0.1 mg) by mouth 2 times a day. Mone Aquino MD Taking Active   folic acid (Folvite) 1 mg tablet [id number] Yes Take 1 tablet (1 mg) by mouth once daily. Nina Cunha MD Taking Active   FreeStyle Remington 3 [place] misc [id number] Yes Use as instructed to monitor blood glucose. Mone Aquino MD Taking Active   gabapentin (Neurontin) 300 mg capsule [id number] Yes Take 1 capsule (300 mg) by mouth 2 times a day. Mone Aquino MD Taking Active   insulin glargine (Basaglar KwikPen U-100 Insulin) 100 unit/mL (3 mL) pen [id number] Yes Inject 32 Units under the skin once daily in the morning. Take as directed per insulin instructions. Mone Aquino MD Taking Active   insulin lispro (Admelog SoloStar U-100 Insulin) 100 unit/mL injection [id number] Yes Inject per sliding scale instructions under the skin 3 times a day with meals. (Max daily dose 70 units) Mone Aquino MD Taking Active   meclizine (Antivert) 25 mg tablet,chewable [id number] Yes Chew 1 tablet (25 mg) 3 times a day as needed (vertigo). Mone Aquino MD Taking Active   OneTouch Delica Plus Lancet 30 gauge misc [id number] Yes USE TO TEST BLOOD SUGAR AS DIRECTED Mone Aquino MD Taking Active   OneTouch Ultra Test strip [id number] Yes Use 1 strip BID to check glucose Mone Aquino MD Taking Active   OneTouch Ultra2 Meter misc [id number] Yes use 1 to 2 times daily Mone Aquino MD Taking Active   pen needle, diabetic 32 gauge x 5/32\" needle [id number] Yes Use four times a day with insulin use Mone Aqiuno MD Taking Active   tirzepatide (Mounjaro) 2.5 mg/0.5 mL pen injector [id number] Yes Inject 2.5 mg under the skin every 7 days. Mone Aquino MD Taking Active              "       No Known Allergies    Social History     Socioeconomic History    Marital status: Single     Spouse name: Not on file    Number of children: Not on file    Years of education: Not on file    Highest education level: Not on file   Occupational History    Not on file   Tobacco Use    Smoking status: Never     Passive exposure: Never    Smokeless tobacco: Never   Vaping Use    Vaping status: Never Used   Substance and Sexual Activity    Alcohol use: Yes    Drug use: Never    Sexual activity: Defer   Other Topics Concern    Not on file   Social History Narrative    Not on file     Social Determinants of Health     Financial Resource Strain: Low Risk  (4/16/2024)    Overall Financial Resource Strain (CARDIA)     Difficulty of Paying Living Expenses: Not very hard   Recent Concern: Financial Resource Strain - Medium Risk (1/25/2024)    Overall Financial Resource Strain (CARDIA)     Difficulty of Paying Living Expenses: Somewhat hard   Food Insecurity: No Food Insecurity (1/25/2024)    Hunger Vital Sign     Worried About Running Out of Food in the Last Year: Never true     Ran Out of Food in the Last Year: Never true   Transportation Needs: No Transportation Needs (6/26/2024)    OASIS : Transportation     Lack of Transportation (Medical): No     Lack of Transportation (Non-Medical): No     Patient Unable or Declines to Respond: No   Physical Activity: Insufficiently Active (1/25/2024)    Exercise Vital Sign     Days of Exercise per Week: 1 day     Minutes of Exercise per Session: 20 min   Stress: No Stress Concern Present (1/25/2024)    [employer] - Occupational Stress Questionnaire     Feeling of Stress : Only a little   Social Connections: Feeling Socially Integrated (6/26/2024)    OASIS : Social Isolation     Frequency of experiencing loneliness or isolation: Never   Intimate Partner Violence: Not At Risk (1/26/2024)    Humiliation, Afraid, Rape, and Kick questionnaire      Fear of Current or Ex-Partner: No     Emotionally Abused: No     Physically Abused: No     Sexually Abused: No   Housing Stability: Low Risk  (4/16/2024)    Housing Stability Vital Sign     Unable to Pay for Housing in the Last Year: No     Number of Places Lived in the Last Year: 1     Unstable Housing in the Last Year: No       Past Surgical History:   Procedure Laterality Date    CT ANGIO NECK  1/25/2023    CT NECK ANGIO W AND WO IV CONTRAST 1/25/2023 DOCTOR OFFICE [facility]    CT HEAD ANGIO W AND WO IV CONTRAST  1/25/2023    CT HEAD ANGIO W AND WO IV CONTRAST 1/25/2023 DOCTOR OFFICE LEGAC[facility]    OTHER SURGICAL HISTORY  10/21/2020    Toe amputation       Gen: The patient is alert and oriented ×3, is in no acute distress, and appear their stated age and weight.    Psychiatric: Mood and affect are appropriate.    Eyes: Sclera are white, and pupils are round and symmetric.    ENT: Mucous membranes are moist.     Neck: Supple. Thyroid is midline.    Respiratory: Respirations are nonlabored, chest rise is symmetric.    Cardiac: Rate is regular by palpation of distal pulses.     Abdomen: Nondistended.    Integument: No obvious cutaneous lesions are noted. No signs of lymphangitis. No signs of systemic edema.  side: left lower extremity :  her  surgical incisions are healing well, without evidence of erythema, fluctuance, drainage, or infection.  The skin around the incision is intact.  Distally neurovascular exam is stable.  There is appropriate tenderness to palpation in the selene-incisional area. No calf swelling or tenderness to palpation.      I personally reviewed multiple views of left knee and femur were obtained in the office today demonstrate maintenance of reduction, interval healing, and a stable position of the hardware.      Nuris Squires is a 59 y.o. female patient status post intramedullary nail and ORIF left distal femur fracture on 4/16/2024.   I went over her x-rays in detail today.   she is WBAT of the  side: left lower extremity. ~He/she~ is range of motion as tolerated of the side: left lower extremity.  I stressed the importance of physical therapy on overall functional outcome.  To work on range of motion. Needs to take vitamin D and calcium she is on the slower end of healing.  I like to see more callus at her next visit.  I answered all patient's questions he agrees with treatment plan.  I will see her back in Follow-up 4 months with repeat 2 views left knee and 2 views femur.        Barrera Rodriguez    Department of Orthopaedic Trauma Surgery

## 2024-10-11 ENCOUNTER — APPOINTMENT (OUTPATIENT)
Dept: RADIOLOGY | Facility: HOSPITAL | Age: 59
End: 2024-10-11
Payer: COMMERCIAL

## 2024-10-23 DIAGNOSIS — I10 BENIGN ESSENTIAL HYPERTENSION: ICD-10-CM

## 2024-10-23 DIAGNOSIS — R06.2 WHEEZING: ICD-10-CM

## 2024-10-23 RX ORDER — AMLODIPINE BESYLATE 10 MG/1
10 TABLET ORAL DAILY
Qty: 90 TABLET | Refills: 0 | Status: SHIPPED | OUTPATIENT
Start: 2024-10-23

## 2024-10-23 RX ORDER — ALBUTEROL SULFATE 90 UG/1
2 INHALANT RESPIRATORY (INHALATION) EVERY 6 HOURS PRN
Qty: 6.7 G | Refills: 0 | Status: SHIPPED | OUTPATIENT
Start: 2024-10-23 | End: 2025-10-23

## 2024-10-23 RX ORDER — CLONIDINE HYDROCHLORIDE 0.1 MG/1
0.1 TABLET ORAL 2 TIMES DAILY
Qty: 180 TABLET | Refills: 0 | Status: SHIPPED | OUTPATIENT
Start: 2024-10-23

## 2024-10-25 ENCOUNTER — DOCUMENTATION (OUTPATIENT)
Dept: PHYSICAL THERAPY | Facility: CLINIC | Age: 59
End: 2024-10-25
Payer: COMMERCIAL

## 2024-10-25 ENCOUNTER — APPOINTMENT (OUTPATIENT)
Dept: PHYSICAL THERAPY | Facility: CLINIC | Age: 59
End: 2024-10-25
Payer: COMMERCIAL

## 2024-10-29 ENCOUNTER — APPOINTMENT (OUTPATIENT)
Dept: PHARMACY | Facility: HOSPITAL | Age: 59
End: 2024-10-29
Payer: COMMERCIAL

## 2024-10-29 DIAGNOSIS — E11.3511 TYPE 2 DIABETES MELLITUS WITH RIGHT EYE AFFECTED BY PROLIFERATIVE RETINOPATHY AND MACULAR EDEMA, WITH LONG-TERM CURRENT USE OF INSULIN: ICD-10-CM

## 2024-10-29 DIAGNOSIS — Z79.4 TYPE 2 DIABETES MELLITUS WITH RIGHT EYE AFFECTED BY PROLIFERATIVE RETINOPATHY AND MACULAR EDEMA, WITH LONG-TERM CURRENT USE OF INSULIN: ICD-10-CM

## 2024-11-04 ENCOUNTER — NUTRITION (OUTPATIENT)
Dept: NUTRITION | Facility: HOSPITAL | Age: 59
End: 2024-11-04
Payer: COMMERCIAL

## 2024-11-04 VITALS — HEIGHT: 62 IN | BODY MASS INDEX: 40.23 KG/M2

## 2024-11-04 DIAGNOSIS — Z79.4 TYPE 2 DIABETES MELLITUS WITH RIGHT EYE AFFECTED BY PROLIFERATIVE RETINOPATHY AND MACULAR EDEMA, WITH LONG-TERM CURRENT USE OF INSULIN: Primary | ICD-10-CM

## 2024-11-04 DIAGNOSIS — E11.3511 TYPE 2 DIABETES MELLITUS WITH RIGHT EYE AFFECTED BY PROLIFERATIVE RETINOPATHY AND MACULAR EDEMA, WITH LONG-TERM CURRENT USE OF INSULIN: Primary | ICD-10-CM

## 2024-11-04 PROCEDURE — 97802 MEDICAL NUTRITION INDIV IN: CPT

## 2024-11-04 NOTE — PATIENT INSTRUCTIONS
Nutrition Education Topics Discussed:   Utilize the Plate Method at meals in order to balance the types and amounts of food on the plate.   Half of the plate full of non-starchy vegetables. These foods contribute very little carbohydrate to the plate, and they add fiber to the meal. Salad, carrots, bell peppers, zucchini, broccoli, cauliflower, asparagus, radishes, carrots and green beans are a few examples of these foods. They can be served cooked or raw.    One quarter of plate including protein foods. Examples can include meat, nuts, seeds, cheese, cottage cheese, nut butter, fish, seafood, tofu, and edamame.    One quarter of the plate including carbohydrate foods. These foods include grains, fruit, legumes, starchy vegetables, milk and yogurt. Aim to eat at least half of the daily grains as whole grains.    Discussed importance of consistent meal pattern   Discussed how eating consistently and balanced meals help improve satiety, boot metabolism and helps to improve blood glucose levels   Encouraged pt to eat first meal of the day within 1-2hrs after waking up to help boost metabolism   Encouraged meals and snacks to be  throughout the day with no more than a 3-4hr gap in between to help boost metabolism    Diabetes Diet Education:  Educated on pathophysiology of pre-DM/DM & how CHO's impact BG levels  Reviewed what is a CHO, food sources, proper portion sizes of these foods (~15gm CHO) for CHO counting  Reviewed balance meals utilizing the ADA MyPlate method: 1/2 plate non starchy vegetables, 1/4 plate lean proteins and 1/4 plate complex minimally processed CHOs  Encouraged avoidance of sweetened/sugary beverages      Educational Handouts Provided: ADA Plate Method,  Balanced Breakfast, High Protein Meal Ideas, High Protein Snack Ideas, and Foods list (carbohydrate foods, non starchy veggies, protein sources, fat sources) from  Mediterranean Booklet (Pages 6-13)    Patient Goals: 1. Aim to have  balanced meals using the ADA plate method 3 days a week  2. Aim to have a balanced nighttime snack that has complex starch and protein/fat

## 2024-11-04 NOTE — PROGRESS NOTES
Nutrition Initial Assessment:     Patient Nuris Squires is a 59 y.o. female [place] [facility] [place] by Mone Aquino MD  on 9/4/24 for   1. 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, with long-term current use of insulin    Vertigo    Superficial dyspareunia    Effusion, left knee    Early satiety    Dermatophytosis of nail    Corns and callosities    Colitis    Lumbago    Chronic left-sided low back pain with left-sided sciatica    Charcot's joint, right ankle and foot    Chronic constipation    Dyspnea on exertion    Syncope, unspecified syncope type    Chronic renal disease, stage IV (Multi)    Closed fracture of left femur    Anemia    Chronic renal insufficiency    Diabetic nephropathy (Multi)    History of home oxygen therapy    Unsteady gait    At high risk for falls    Pain    Vitamin D deficiency    Hives    Body mass index 40.0-44.9, adult (Multi)    Type 2 diabetes mellitus without complications (Multi)    Essential (primary) hypertension    Charcot's arthropathy    Autonomic neuropathy due to type 2 diabetes mellitus (Multi)    Obesity with body mass index 30 or greater    Other fracture of left femur, subsequent encounter for closed fracture with routine healing    Peripheral vascular disease (CMS-HCC)    Knee pain    Screening examination for venereal disease    History of elevated lipids    History of hypertension    Pain of foot    Muscle  weakness (generalized)    Other abnormalities of gait and mobility    Right lower quadrant abdominal pain    History of diabetes mellitus    Morbid obesity (Multi)    Strain of lumbar region    Type 2 diabetes mellitus without complication (Multi)    Candidiasis of vagina     Nutrition History:  Food & Nutrition History: Has had previous DM diet education but has been awhile. Currently on insulin. Looking for a refresh on DM diet education. Sometimes may eat only once a day + a snack; sometimes 2 meals a day with a snack. Noted not much of an appetite. Not a big veggie fan. Will eat greens, cabbage or veggies mixed in soups. Eats veggies maybe once a week. Loves fruit but has not eaten this in a while. Will eat milk, eggs (twice a week), and cheese. Gets full easily.  Food Allergies: none  Food Intolerances: none  Vitamin/mineral intake: D (not taking folic acid or B complex that is noted in chart)   (none)  Prescription medications:    GI Symptoms: none; Occurring >2 weeks?    Oral Problems: denies; Dentition: own  Sleep Habits:  (goes to bed 9pm but does not fall asleep until later; wakes up 9am. 4-5 hours interupted sleep)    Diet Recall:  Meal 1: B skips; sometimes will have cereal with milk or eggs and sausage  Meal 2: L (11-12pm): baked chicken/pork chop/roast, rice/mashed potatoes/mac n cheese  Meal 3: D: usually skips, but may eat something similar to lunch  Snacks: Maybe once a day a small bag of lays potato chips  Food Variety: Absent  Oral Nutrition Supplement Use:  (none)  Fluid Intake: 20oz regular Soda once a day, water, juice (4-8oz OJ if BG levels low; does this sometimes twice a day)  Energy Intake: Fair 50-75 %    Food Preparation:  Cooking: Patient  Grocery Shopping: Patient  Dining Out: 1 to 3 times a week    Physical Activity:   Goes to outpatient PT following breaking her femur bone in her L leg. Goes once a week for ~1hr. Has a seating rollator, ambulates outside of house for  "appointments.  Consistency: Yes    Food Security/Insecurity: Food / Nutrition Program Participation:  (no issues)    Anthropometrics:  Height: 157.5 cm (5' 2.01\")   Weight:  (Most recent wt in EMR: 99.8 kg 9/5/24)    BMI 40.23  IBW/kg (Dietitian Calculated): 50 kg Percent of IBW: 200 %     Adjusted Body Weight (kg): 62.5 kg    Weight History:   Daily Weight  09/05/24 : 99.8 kg (220 lb)  09/04/24 : 102 kg (224 lb)  07/19/24 : 102 kg (224 lb)  04/15/24 : 102 kg (225 lb)  02/20/24 : 102 kg (225 lb)  02/12/24 : 102 kg (225 lb)  02/02/24 : 102 kg (225 lb)  01/31/24 : 107 kg (234 lb 12.8 oz) --?  01/10/24 : 102 kg (225 lb)  09/14/23 : 101 kg (221 lb 9.6 oz)    Weight Change %:  Significant Weight Loss: No    Nutrition Focused Physical Exam Findings:  Deferred as pt visually appears well-nourished with no signs of muscle or subcutaneous fat losses      Nutrition Significant Labs:  CMP trend:    Recent Labs     09/06/24  0120 04/24/24  1132 04/23/24  0803 04/17/24  0901 04/15/24  2128 02/04/24  0555 02/03/24  0122   GLUCOSE 109* 139* 223*   < > 399*   < > 94    139 138   < > 136   < > 147*   K 4.1 5.0 5.0   < > 4.2   < > 4.6   * 106 105   < > 103   < > 107   CO2 27 24 23   < > 26   < > 26   ANIONGAP 12 14 15   < > 11   < > 19   BUN 29* 39* 36*   < > 30*   < > 19   CREATININE 1.68* 1.93* 1.64*   < > 1.36*   < > 0.89   EGFR 35* 30* 36*   < > 45*   < > 75   CALCIUM 8.8 8.3* 8.6   < > 8.9   < > 9.6   ALBUMIN 3.3* 2.6* 2.7*   < > 3.5   < > 3.4   ALKPHOS 123  --   --   --  120*  --  91   PROT 7.3  --   --   --  6.9  --  7.1   AST 11  --   --   --  22  --  21   BILITOT 0.4  --   --   --  0.4  --  0.4   ALT 10  --   --   --  23  --  22    < > = values in this interval not displayed.   , Lipid Panel trend:    Recent Labs     05/08/23  0935 04/12/21  0906 10/20/20  1236   CHOL 174 161 181   HDL 38.5* 38.9* 41.5   LDLF 98 101* 115*   VLDL 38 21 24   TRIG 189* 104 121   , Hgb A1c trend:   Recent Labs     07/19/24  0852 " "  HGBA1C 11.2*   , Vit D:   Lab Results   Component Value Date    VITD25 12 (A) 06/14/2023    , Iron Panel:   Lab Results   Component Value Date    IRON 59 01/18/2024    TIBC 172 (L) 01/18/2024    FERRITIN 276 (H) 01/18/2024    , Vit B12:   Lab Results   Component Value Date    [id number] 582 01/18/2024    , and Folate: No results found for: \"FOLATE\"     Nutrition Specific Medications:  Current Outpatient Medications   Medication Instructions    acetaminophen (TYLENOL) 650 mg, oral, Every 4 hours PRN    albuterol 90 mcg/actuation inhaler 2 puffs, inhalation, Every 6 hours PRN    amLODIPine (NORVASC) 10 mg, oral, Daily    aspirin 81 mg, oral, Daily RT    atorvastatin (LIPITOR) 40 mg, oral, Daily    b complex vitamins capsule 1 capsule, oral, Daily    Basaglar KwikPen U-100 Insulin 32 Units, subcutaneous, Every morning, Take as directed per insulin instructions.    blood-glucose sensor (FreeStyle Remington 3 Sensor) device Use to monitor blood glucose. Change sensor every 14 days.    calcium carbonate-vitamin D3 (Calcium 600 with Vitamin D3) 600 mg-12.5 mcg (500 unit) capsule 1 capsule, oral, 2 times daily    cholecalciferol (VITAMIN D-3) 50 mcg, oral, Daily RT    cloNIDine (CATAPRES) 0.1 mg, oral, 2 times daily    folic acid (Folvite) 1 mg tablet 1 tablet, oral, Daily    FreeStyle Remington 3 [place] misc Use as instructed to monitor blood glucose.    gabapentin (NEURONTIN) 300 mg, oral, 2 times daily    insulin lispro (Admelog SoloStar U-100 Insulin) 100 unit/mL injection Inject per sliding scale instructions under the skin 3 times a day with meals. (Max daily dose 70 units)    meclizine (ANTIVERT) 25 mg, oral, 3 times daily PRN    Mounjaro 2.5 mg, subcutaneous, Every 7 days    OneTouch Delica Plus Lancet 30 gauge misc USE TO TEST BLOOD SUGAR AS DIRECTED    OneTouch Ultra Test strip Use 1 strip BID to check glucose    OneTouch Ultra2 Meter misc use 1 to 2 times daily    pen needle, diabetic 32 gauge x 5/32\" needle Use four " times a day with insulin use        Estimated Needs: did not discuss today    ;     ;         Nutrition Diagnosis   Malnutrition Diagnosis  Patient has Malnutrition Diagnosis: No    Nutrition Diagnosis  Patient has Nutrition Diagnosis: Yes  Diagnosis Status (1): New  Nutrition Diagnosis 1: Inconsistent carbohydrate intake  Related to (1): Food- and nutrition-related knowledge deficit concerning appropriate timing of carbohydrate intake  As Evidenced by (1): per diet recall of eating only 1-2 meals a day with 1 snack with inconsistent CHO intake; unbalanced meals per diet recall       Nutrition Interventions/Recommendations   Nutrition Prescription: General healthy diet with focus on CHO modified diet for DM management    Nutrition Interventions:   Food and Nutrient Delivery: Meals & Snacks: Energy-modified diet, General Healthful Diet, Carbohydrate-modified diet, Modify Composition of Meals/Snacks  Goals: 3 balanced meals a day with 1-2 balanced snacks as needed     Coordination of Care:       Nutrition Education:   Nutrition Education Content: Content related nutrition education  Goals: Balanced meals using plate method, timing of meals, reviewed basics of what is a CHO and portion sizes of CHO food for meal planning, reviewed normal glucose metabolism and what happens to glucose metabolism with DM    Nutrition Education Topics Discussed:   Utilize the Plate Method at meals in order to balance the types and amounts of food on the plate.   Half of the plate full of non-starchy vegetables. These foods contribute very little carbohydrate to the plate, and they add fiber to the meal. Salad, carrots, bell peppers, zucchini, broccoli, cauliflower, asparagus, radishes, carrots and green beans are a few examples of these foods. They can be served cooked or raw.    One quarter of plate including protein foods. Examples can include meat, nuts, seeds, cheese, cottage cheese, nut butter, fish, seafood, tofu, and edamame.     One quarter of the plate including carbohydrate foods. These foods include grains, fruit, legumes, starchy vegetables, milk and yogurt. Aim to eat at least half of the daily grains as whole grains.    Discussed importance of consistent meal pattern   Discussed how eating consistently and balanced meals help improve satiety, boot metabolism and helps to improve blood glucose levels   Encouraged pt to eat first meal of the day within 1-2hrs after waking up to help boost metabolism   Encouraged meals and snacks to be  throughout the day with no more than a 3-4hr gap in between to help boost metabolism    Diabetes Diet Education:  Educated on pathophysiology of pre-DM/DM & how CHO's impact BG levels  Reviewed what is a CHO, food sources, proper portion sizes of these foods (~15gm CHO) for CHO counting  Reviewed balance meals utilizing the ADA MyPlate method: 1/2 plate non starchy vegetables, 1/4 plate lean proteins and 1/4 plate complex minimally processed CHOs  Encouraged avoidance of sweetened/sugary beverages      Educational Handouts Provided: ADA Plate Method,  Balanced Breakfast, High Protein Meal Ideas, High Protein Snack Ideas, and Foods list (carbohydrate foods, non starchy veggies, protein sources, fat sources) from  Mediterranean Booklet (Pages 6-13)    Nutrition Counseling: Strategies: Nutrition counseling based on motivational interviewing strategy, Nutrition counseling based on goal setting strategy    Patient Goals: 1. Aim to have balanced meals using the ADA plate method 3 days a week  2. Aim to have a balanced nighttime snack that has complex starch and protein/fat    Readiness to Change : Good  Level of Understanding : Good  Anticipated Compliant : Good         Nutrition Monitoring and Evaluation   Food/Nutrient Related History Monitoring  Monitoring and Evaluation Plan: Meal/snack pattern  Meal/Snack Pattern: Estimated meal and snack pattern  Criteria: Consume 3 balanced meals per day  using plate method & 1-2 balanced snacks a day         Body Composition/Growth/Weight History  Monitoring and Evaluation Plan: Weight  Weight: Measured weight  Criteria: Intentional weight loss of 0.5-2 lb per week, trending toward a clinically significant weight loss of 5-10% of current body weight.    Biochemical Data, Medical Tests and Procedures  Monitoring and Evaluation Plan: Glucose/endocrine profile, Lipid profile  Glucose/Endocrine Profile: Hemoglobin A1c (HgbA1c)  Criteria: <7%  Criteria: CHOL <200; HDL 40-60; LDL <100; TG <150 mg/dL              Follow Up: 6-8 weeks      Time Spent  Prep time on day of patient encounter: 5 minutes  Time spent directly with patient, family or caregiver: 25 minutes  Documentation Time: 15 minutes

## 2024-11-15 ENCOUNTER — HOSPITAL ENCOUNTER (OUTPATIENT)
Dept: RADIOLOGY | Facility: CLINIC | Age: 59
Discharge: HOME | End: 2024-11-15
Payer: COMMERCIAL

## 2024-11-15 VITALS — WEIGHT: 220.02 LBS | HEIGHT: 62 IN | BODY MASS INDEX: 40.49 KG/M2

## 2024-11-15 DIAGNOSIS — Z12.31 ENCOUNTER FOR SCREENING MAMMOGRAM FOR BREAST CANCER: ICD-10-CM

## 2024-11-15 PROCEDURE — 77067 SCR MAMMO BI INCL CAD: CPT

## 2024-11-19 ENCOUNTER — APPOINTMENT (OUTPATIENT)
Dept: PRIMARY CARE | Facility: CLINIC | Age: 59
End: 2024-11-19
Payer: COMMERCIAL

## 2024-11-19 VITALS
SYSTOLIC BLOOD PRESSURE: 122 MMHG | WEIGHT: 220 LBS | TEMPERATURE: 97.5 F | HEART RATE: 78 BPM | BODY MASS INDEX: 40.48 KG/M2 | DIASTOLIC BLOOD PRESSURE: 70 MMHG | HEIGHT: 62 IN | OXYGEN SATURATION: 94 %

## 2024-11-19 DIAGNOSIS — Z79.4 TYPE 2 DIABETES MELLITUS WITH RIGHT EYE AFFECTED BY PROLIFERATIVE RETINOPATHY AND MACULAR EDEMA, WITH LONG-TERM CURRENT USE OF INSULIN: ICD-10-CM

## 2024-11-19 DIAGNOSIS — E78.2 MIXED HYPERLIPIDEMIA: ICD-10-CM

## 2024-11-19 DIAGNOSIS — I10 ESSENTIAL (PRIMARY) HYPERTENSION: Primary | ICD-10-CM

## 2024-11-19 DIAGNOSIS — E11.3511 TYPE 2 DIABETES MELLITUS WITH RIGHT EYE AFFECTED BY PROLIFERATIVE RETINOPATHY AND MACULAR EDEMA, WITH LONG-TERM CURRENT USE OF INSULIN: ICD-10-CM

## 2024-11-19 DIAGNOSIS — E66.01 MORBID OBESITY WITH BODY MASS INDEX (BMI) OF 40.0 OR HIGHER (MULTI): ICD-10-CM

## 2024-11-19 DIAGNOSIS — J06.9 VIRAL UPPER RESPIRATORY TRACT INFECTION: ICD-10-CM

## 2024-11-19 LAB — POC HEMOGLOBIN A1C: 9 % (ref 4.2–6.5)

## 2024-11-19 PROCEDURE — 99214 OFFICE O/P EST MOD 30 MIN: CPT | Performed by: FAMILY MEDICINE

## 2024-11-19 PROCEDURE — 3074F SYST BP LT 130 MM HG: CPT | Performed by: FAMILY MEDICINE

## 2024-11-19 PROCEDURE — 3062F POS MACROALBUMINURIA REV: CPT | Performed by: FAMILY MEDICINE

## 2024-11-19 PROCEDURE — 82043 UR ALBUMIN QUANTITATIVE: CPT

## 2024-11-19 PROCEDURE — 83036 HEMOGLOBIN GLYCOSYLATED A1C: CPT | Performed by: FAMILY MEDICINE

## 2024-11-19 PROCEDURE — 3046F HEMOGLOBIN A1C LEVEL >9.0%: CPT | Performed by: FAMILY MEDICINE

## 2024-11-19 PROCEDURE — 82570 ASSAY OF URINE CREATININE: CPT

## 2024-11-19 PROCEDURE — 3008F BODY MASS INDEX DOCD: CPT | Performed by: FAMILY MEDICINE

## 2024-11-19 PROCEDURE — 3078F DIAST BP <80 MM HG: CPT | Performed by: FAMILY MEDICINE

## 2024-11-19 ASSESSMENT — ENCOUNTER SYMPTOMS
OCCASIONAL FEELINGS OF UNSTEADINESS: 1
LOSS OF SENSATION IN FEET: 0
DEPRESSION: 0

## 2024-11-19 ASSESSMENT — PAIN SCALES - GENERAL: PAINLEVEL_OUTOF10: 0-NO PAIN

## 2024-11-19 NOTE — PROGRESS NOTES
"Subjective   Patient ID: Nuris Squires is a 59 y.o. female who presents for Follow-up, Spasms (Right foot), and Diabetes Mellitus (A1C/Fasting/).  Working with  Pharmacy, and also seeing endocrinologist and nutritionist.  Using CGM, has been helpful.  Will meet with Margie next week  Will follow up with endocrinologist and nutritionist in January.  Currently using basaglar 35 units once a day, and SSI 1-3 times a day with meals.  Has to be careful not to have glucose go too low.    Has a cold, stuffy, makes it hard to sleep.  Symptoms for couple days.    Having foot spasms, right > left.  Will come in spurts.  Tries to drink a lot of water.    Not checking BP, but does have a monitor.    Wants a lift chair.  Getting up from a chair is difficult.  Has imbalance.  Had a fall in April, broke leg.  Fax to Karen Abercrombie, cell [phone], fax [phone]--[facility]      Diabetes  She presents for her follow-up diabetic visit. She has type 2 diabetes mellitus. Her disease course has been improving. There are no hypoglycemic associated symptoms. Pertinent negatives for diabetes include no blurred vision and no chest pain. Diabetic complications include nephropathy, peripheral neuropathy and retinopathy.       Review of Systems   Eyes:  Negative for blurred vision.   Cardiovascular:  Negative for chest pain.       Objective   /70 (BP Location: Left arm, Patient Position: Sitting, BP Cuff Size: Large adult)   Pulse 78   Temp 36.4 °C (97.5 °F)   Ht 1.575 m (5' 2\")   Wt 99.8 kg (220 lb)   SpO2 94%   BMI 40.24 kg/m²     Physical Exam  Vitals reviewed.   Constitutional:       Appearance: Normal appearance.   HENT:      Right Ear: Tympanic membrane and ear canal normal.      Left Ear: Tympanic membrane and ear canal normal.      Nose: Congestion and rhinorrhea present.      Mouth/Throat:      Pharynx: Oropharynx is clear. No oropharyngeal exudate or posterior oropharyngeal erythema. "   Cardiovascular:      Rate and Rhythm: Normal rate and regular rhythm.   Pulmonary:      Effort: Pulmonary effort is normal.      Breath sounds: Normal breath sounds.   Musculoskeletal:      Right lower leg: No edema.      Left lower leg: No edema.   Lymphadenopathy:      Cervical: No cervical adenopathy.   Neurological:      Mental Status: She is alert.   Psychiatric:         Mood and Affect: Mood normal.         Behavior: Behavior normal.           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

## 2024-11-20 DIAGNOSIS — R92.8 ABNORMAL MAMMOGRAM: Primary | ICD-10-CM

## 2024-11-20 LAB
CREAT UR-MCNC: 70.1 MG/DL (ref 20–320)
MICROALBUMIN UR-MCNC: >2250 MG/L
MICROALBUMIN/CREAT UR: NORMAL MG/G{CREAT}

## 2024-11-21 DIAGNOSIS — R92.8 ABNORMAL MAMMOGRAM: Primary | ICD-10-CM

## 2024-11-21 ASSESSMENT — ENCOUNTER SYMPTOMS: BLURRED VISION: 0

## 2024-11-21 NOTE — PATIENT INSTRUCTIONS
A1C 9.0, still too high, but down from 11.2 in July.  Increase Basaglar to 37 units.  Continue sliding scale insulin.  Monitor glucose to make sure not going too low.  Using CGM.  Will be seeing endocrinologist and nutritionist in January.  Working with  pharmacy next week.    BP is controlled.  Continue amlodipine 10 mg, clonidine 0.1 mg.    Recent cold symptoms.  Treat symptomatically.  If not improving, or if worsening, follow up.    Will fax Rx for lift chair to Karen Abercrombie, cell [phone], fax [phone]--[facility]

## 2024-11-25 ENCOUNTER — HOSPITAL ENCOUNTER (OUTPATIENT)
Dept: RADIOLOGY | Facility: CLINIC | Age: 59
Discharge: HOME | End: 2024-11-25
Payer: COMMERCIAL

## 2024-11-25 DIAGNOSIS — R92.8 ABNORMAL MAMMOGRAM: ICD-10-CM

## 2024-11-25 PROCEDURE — 76642 ULTRASOUND BREAST LIMITED: CPT | Mod: LEFT SIDE | Performed by: RADIOLOGY

## 2024-11-25 PROCEDURE — 76982 USE 1ST TARGET LESION: CPT | Mod: 50,LT

## 2024-11-25 PROCEDURE — 76642 ULTRASOUND BREAST LIMITED: CPT | Mod: LT

## 2024-11-26 ENCOUNTER — APPOINTMENT (OUTPATIENT)
Dept: PHARMACY | Facility: HOSPITAL | Age: 59
End: 2024-11-26
Payer: COMMERCIAL

## 2024-11-26 DIAGNOSIS — E11.3511 TYPE 2 DIABETES MELLITUS WITH RIGHT EYE AFFECTED BY PROLIFERATIVE RETINOPATHY AND MACULAR EDEMA, WITH LONG-TERM CURRENT USE OF INSULIN: ICD-10-CM

## 2024-11-26 DIAGNOSIS — Z79.4 TYPE 2 DIABETES MELLITUS WITH RIGHT EYE AFFECTED BY PROLIFERATIVE RETINOPATHY AND MACULAR EDEMA, WITH LONG-TERM CURRENT USE OF INSULIN: ICD-10-CM

## 2024-11-26 NOTE — PROGRESS NOTES
Clinical Pharmacy Appointment    Patient ID: Nuris Squires is a 59 y.o. female who presents for Diabetes.    Pt is here for Follow Up appointment.   Referring Provider: Mone Aquino MD  PCP: Mone Aquino MD, last visit: 11/19/24, next visit: 2/20/25    Subjective   HPI  PMH significant for T2DM, CKD, HTN, HLD, PAD/PVD.  Special needs/barriers to therapy: None identified    Medication System Management  Patients preferred pharmacy: [facility] in [place]  Adherence/Organization: No current concerns  Affordability/Accessibility:  High brand copays, application for  PAP pending financial documentation    Drug Interactions  No relevant drug interactions were noted.      DIABETES MELLITUS Type 2:    Known diabetic complications: peripheral neuropathy, peripheral vascular disease, chronic kidney disease, and obesity.  Hx or FH Hx of MTC/MEN2?: No, Pancreatitis?: No, Gastroparesis?: No, UTI/Yeast Infections?: Yes, recent UTI, previous Hx yeast infections    Follows with Endocrinology?: Yes, New pt appt completed 7/19/24 followed by no-show. Appt scheduled for Jan 2025.  Diabetic Eye Exam: Needs completed, last > 1 year ago  Monofilament Foot Exam: Following with podiatry, needs to schedule     Current diabetic medications include:  Basaglar 37 units once daily  Admelog SS before meals (1-3 times per day)  Blood Glucose Admelog Units   < 80 0   81 - 150 10   151 - 200 12   201 - 250 14   252 - 300 16   301 - 350 18   351 - 400 20   > 400 22 + call provider   Previous medications: metformin, Jardiance (VENKAT)     Clarifications to above regimen: None  Adverse Effects: None    Glucose Readings:  Glucometer/CGM Type: FreeStyle Remington 3     Previous home BG readings: (10/29/24) 14-day Avg , TIR 34%  Current home BG readings:  Average Glucose   7-day 14-day 30-day   Average 207 223 214   Overnight (12am - 6am) 197 210 212   Morning (6am - 12pm) 228 243 239   Afternoon (12pm - 6pm) 219 239 222   Evening  (6pm-12am) 184 201 185     Time in [employer]   7-day 14-day 30-day   Above (>180) 63 70 67   In [employer] () 37 30 33   Below (<70) 0 0 0     Any episodes of hypoglycemia? Yes - Recalls 2 occurrences of overnight BG 67-70, shakiness  Did patient treat episode of hypoglycemia appropriately? Yes, treated with OJ. Keep peppermints available as well.  Does the patient have a prescription for ready-to-use Glucagon? No,      Lifestyle:  Diet: 1-2 meals/day. Low appetite. Meals are baked and consist of meat + side (potatoes, noodles, rice). No recent changes.   Physical Activity: Gets out of the house 1-2 times a week for shopping/walking     Risk Reducing Medications:  Statin? Yes  ACE-I/ARB? No - indicated for updated JUNIOR     Preventative Care  Immunizations Needed: Annual Flu, RSV, Prevnar, Shingrix, and Tdap  Tobacco Use: non-smoker    Objective   No Known Allergies  Social History     Social History Narrative    Not on file      Medication Review  Current Outpatient Medications   Medication Instructions    acetaminophen (TYLENOL) 650 mg, oral, Every 4 hours PRN    albuterol 90 mcg/actuation inhaler 2 puffs, inhalation, Every 6 hours PRN    amLODIPine (NORVASC) 10 mg, oral, Daily    aspirin 81 mg, oral, Daily RT    atorvastatin (LIPITOR) 40 mg, oral, Daily    b complex vitamins capsule 1 capsule, Daily    Basaglar KwikPen U-100 Insulin 32 Units, subcutaneous, Every morning, Take as directed per insulin instructions.    blood-glucose sensor (FreeStyle Remington 3 Sensor) device Use to monitor blood glucose. Change sensor every 14 days.    calcium carbonate-vitamin D3 (Calcium 600 with Vitamin D3) 600 mg-12.5 mcg (500 unit) capsule 1 capsule, oral, 2 times daily    cloNIDine (CATAPRES) 0.1 mg, oral, 2 times daily    folic acid (Folvite) 1 mg tablet 1 tablet, Daily    FreeStyle Remington 3 [place] misc Use as instructed to monitor blood glucose.    gabapentin (NEURONTIN) 300 mg, oral, 2 times daily    insulin lispro (Admelog  "SoloStar U-100 Insulin) 100 unit/mL injection Inject per sliding scale instructions under the skin 3 times a day with meals. (Max daily dose 70 units)    meclizine (ANTIVERT) 25 mg, oral, 3 times daily PRN    Mounjaro 2.5 mg, subcutaneous, Every 7 days    OneTouch Delica Plus Lancet 30 gauge misc USE TO TEST BLOOD SUGAR AS DIRECTED    OneTouch Ultra Test strip Use 1 strip BID to check glucose    OneTouch Ultra2 Meter misc use 1 to 2 times daily    pen needle, diabetic 32 gauge x 5/32\" needle Use four times a day with insulin use      Vitals  BP Readings from Last 2 Encounters:   11/19/24 122/70   09/05/24 (!) 193/84     Labs  A1C  Lab Results   Component Value Date    HGBA1C 9 (A) 11/19/2024    HGBA1C 11.2 (A) 07/19/2024    HGBA1C 11.9 (H) 04/16/2024     BMP  Lab Results   Component Value Date    CALCIUM 8.8 09/06/2024     09/06/2024    K 4.1 09/06/2024    CO2 27 09/06/2024     (H) 09/06/2024    BUN 29 (H) 09/06/2024    CREATININE 1.68 (H) 09/06/2024    EGFR 35 (L) 09/06/2024     LFTs  Lab Results   Component Value Date    ALT 10 09/06/2024    AST 11 09/06/2024    ALKPHOS 123 09/06/2024    BILITOT 0.4 09/06/2024     FLP  Lab Results   Component Value Date    TRIG 189 (H) 05/08/2023    CHOL 174 05/08/2023    LDLF 98 05/08/2023    HDL 38.5 (A) 05/08/2023     Urine Microalbumin  Lab Results   Component Value Date    MICROALBCREA  11/19/2024      Comment:      One or more analytes used in this calculation is outside of the analytical measurement range. Calculation cannot be performed.     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 Goal A1c <7%.  Patient's SMBGs remain above goal, 14-day average  with TIR 30%.     Rationale for plan: Basaglar increased at last PCP appt, however BG remains uncontrolled, primarily PPBG. Plan remains to start Mounjaro as soon as possible. Patient states she will provide financial documentation today for enrollment in [facility].    Medication Changes:  CONTINUE  Basaglar 37 units once daily  Admelog SS before meals     Additional Instructions  Patient to submit financial documentation for [facility] enrollment  When enrolled, plan to start Mounjaro 2.5mg once weekly     Future Considerations  When enrolled in [facility] and estimated A1c < 10%, consider initiation of SGLT2i for CKD benefit          Relevant Orders    Referral to Clinical Pharmacy     Patient Education:  Counseled patient on relevant MOA, expectations, side effects, duration of therapy, contraindications, administration, and monitoring parameters.  All questions and concerns addressed. Contact pharmacist with any further questions or concerns prior to next appointment.    Clinical Pharmacist follow-up: 12/17/24 at 1:20pm, Telehealth visit    Thank you,  Amanda Joe, PharmD  Clinical Pharmacist  [phone]    Continue all meds under the continuation of care with the referring provider and clinical pharmacy team.  Verbal consent to manage patient's drug therapy was obtained from the patient. They were informed they may decline to participate or withdraw from participation in pharmacy services at any time.

## 2024-11-26 NOTE — ASSESSMENT & PLAN NOTE
Patient's A1c is 9% on 11/19/24. Goal A1c <7%.  Patient's SMBGs remain above goal, 14-day average  with TIR 30%.     Rationale for plan: Basaglar increased at last PCP appt, however BG remains uncontrolled, primarily PPBG. Plan remains to start Mounjaro as soon as possible. Patient states she will provide financial documentation today for enrollment in [facility].    Medication Changes:  CONTINUE  Basaglar 37 units once daily  Admelog SS before meals     Additional Instructions  Patient to submit financial documentation for [facility] enrollment  When enrolled, plan to start Mounjaro 2.5mg once weekly     Future Considerations  When enrolled in [facility] and estimated A1c < 10%, consider initiation of SGLT2i for CKD benefit

## 2024-12-02 DIAGNOSIS — E11.3511 TYPE 2 DIABETES MELLITUS WITH RIGHT EYE AFFECTED BY PROLIFERATIVE RETINOPATHY AND MACULAR EDEMA, WITH LONG-TERM CURRENT USE OF INSULIN: ICD-10-CM

## 2024-12-02 DIAGNOSIS — Z79.4 TYPE 2 DIABETES MELLITUS WITH RIGHT EYE AFFECTED BY PROLIFERATIVE RETINOPATHY AND MACULAR EDEMA, WITH LONG-TERM CURRENT USE OF INSULIN: ICD-10-CM

## 2024-12-02 RX ORDER — TIRZEPATIDE 2.5 MG/.5ML
2.5 INJECTION, SOLUTION SUBCUTANEOUS
Qty: 2 ML | Refills: 0 | Status: SHIPPED | OUTPATIENT
Start: 2024-12-02

## 2024-12-04 PROCEDURE — RXMED WILLOW AMBULATORY MEDICATION CHARGE

## 2024-12-05 ENCOUNTER — APPOINTMENT (OUTPATIENT)
Dept: PRIMARY CARE | Facility: CLINIC | Age: 59
End: 2024-12-05
Payer: COMMERCIAL

## 2024-12-10 ENCOUNTER — DOCUMENTATION (OUTPATIENT)
Dept: PHARMACY | Facility: HOSPITAL | Age: 59
End: 2024-12-10
Payer: COMMERCIAL

## 2024-12-10 ENCOUNTER — PHARMACY VISIT (OUTPATIENT)
Dept: PHARMACY | Facility: CLINIC | Age: 59
End: 2024-12-10
Payer: MEDICARE

## 2024-12-10 NOTE — PROGRESS NOTES
PAP Approval  Nuris Squires is a 59 y.o. female who was referred to the Clinical Pharmacy Team by Mone Aquino MD.   The patient has been approved for  Patient Assistance Program. Patient has been contacted on the status of the approval. Provided the patient with the [facility] phone number, and made patient aware that they will be hearing from someone at [facility] to set up delivery for the prescriptions. Patient will be contacted each month to set up a delivery date prior to deliver of the medication. Discussed with the patient that if they have not heard from the pharmacy and are in need of medication, they can call the pharmacy to set this up.    Medication(s) Approved: Mounjaro  Approval Duration: 12/4/24 - 12/4/25    Please reach out to the Clinical Pharmacy Team if there are any further questions.     Continue all meds under the continuation of care with the referring provider and clinical pharmacy team.  Verbal consent to manage patient's drug therapy was obtained from the patient/caregiver. They were informed they may decline to participate or withdraw from participation in pharmacy services at any time.    Amanda Joe, PharmD  Clinical Pharmacist  [phone]

## 2024-12-17 ENCOUNTER — APPOINTMENT (OUTPATIENT)
Dept: PHARMACY | Facility: HOSPITAL | Age: 59
End: 2024-12-17
Payer: COMMERCIAL

## 2024-12-17 DIAGNOSIS — Z79.4 TYPE 2 DIABETES MELLITUS WITH RIGHT EYE AFFECTED BY PROLIFERATIVE RETINOPATHY AND MACULAR EDEMA, WITH LONG-TERM CURRENT USE OF INSULIN: ICD-10-CM

## 2024-12-17 DIAGNOSIS — E11.3511 TYPE 2 DIABETES MELLITUS WITH RIGHT EYE AFFECTED BY PROLIFERATIVE RETINOPATHY AND MACULAR EDEMA, WITH LONG-TERM CURRENT USE OF INSULIN: ICD-10-CM

## 2024-12-17 NOTE — PROGRESS NOTES
Clinical Pharmacy Appointment    Patient ID: Nuris Squires is a 59 y.o. female who presents for Diabetes.    Pt is here for Follow Up appointment.   Referring Provider: Mone Aquino MD  PCP: Mone Aquino MD, last visit: 11/19/24, next visit: 2/20/25    Subjective   HPI  PMH significant for T2DM, CKD, HTN, HLD, PAD/PVD.  Special needs/barriers to therapy: None identified     Patient Assistance for Mounjaro approved through 12/4/25. Will have to be renewed prior to that date to prevent lapse in coverage. Medication(s) will be received at no cost to patient from [facility].     Medication System Management  Patients preferred pharmacy: [facility] in [place]  Adherence/Organization: No current concerns  Affordability/Accessibility: No current concerns    Drug Interactions  No relevant drug interactions were noted.      DIABETES MELLITUS Type 2:    Known diabetic complications: peripheral neuropathy, peripheral vascular disease, chronic kidney disease, and obesity.  Hx or FH Hx of MTC/MEN2?: No, Pancreatitis?: No, Gastroparesis?: No, UTI/Yeast Infections?: Yes, recent UTI, previous Hx yeast infections    Follows with Endocrinology?: Yes, New pt appt completed 7/19/24 followed by no-show. Appt scheduled for Jan 2025.  Diabetic Eye Exam: Needs completed, last > 1 year ago  Monofilament Foot Exam: Following with podiatry, needs to schedule     Current diabetic medications include:  Mounjaro 2.5mg once weekly (Monday, 1 doses completed)  Basaglar 37 units once daily  Admelog SS before meals (1-3 times per day)  Blood Glucose Admelog Units   < 80 0   81 - 150 10   151 - 200 12   201 - 250 14   252 - 300 16   301 - 350 18   351 - 400 20   > 400 22 + call provider   Previous medications: metformin, Jardiance (VENKAT)     Clarifications to above regimen: None  Adverse Effects: None    Glucose Readings:  Glucometer/CGM Type: FreeStyle Remington 3 w/ reader    Previous home BG readings: (11/26/24) 14-day  Avg , TIR 30%  Current home BG readings: CGM data not available for review, reports typically in the 200's    Any episodes of hypoglycemia? No  Did patient treat episode of hypoglycemia appropriately? N/A  Does the patient have a prescription for ready-to-use Glucagon? N/A    Lifestyle:  Diet: 1-2 meals/day. Low appetite. Meals are baked and consist of meat + side (potatoes, noodles, rice). No recent changes.  Physical Activity: Gets out of the house 1-2 times a week for shopping/walking     Risk Reducing Medications:  Statin? Yes  ACE-I/ARB? N/A     Preventative Care  Immunizations Needed: Annual Flu, RSV, Prevnar, Shingrix, and Tdap  Tobacco Use: non-smoker    Objective   No Known Allergies  Social History     Social History Narrative    Not on file      Medication Review  Current Outpatient Medications   Medication Instructions    acetaminophen (TYLENOL) 650 mg, oral, Every 4 hours PRN    albuterol 90 mcg/actuation inhaler 2 puffs, inhalation, Every 6 hours PRN    amLODIPine (NORVASC) 10 mg, oral, Daily    aspirin 81 mg, oral, Daily RT    atorvastatin (LIPITOR) 40 mg, oral, Daily    b complex vitamins capsule 1 capsule, Daily    Basaglar KwikPen U-100 Insulin 32 Units, subcutaneous, Every morning, Take as directed per insulin instructions.    blood-glucose sensor (FreeStyle Remington 3 Sensor) device Use to monitor blood glucose. Change sensor every 14 days.    cloNIDine (CATAPRES) 0.1 mg, oral, 2 times daily    folic acid (Folvite) 1 mg tablet 1 tablet, Daily    FreeStyle Remington 3 [place] misc Use as instructed to monitor blood glucose.    gabapentin (NEURONTIN) 300 mg, oral, 2 times daily    insulin lispro (Admelog SoloStar U-100 Insulin) 100 unit/mL injection Inject per sliding scale instructions under the skin 3 times a day with meals. (Max daily dose 70 units)    meclizine (ANTIVERT) 25 mg, oral, 3 times daily PRN    Mounjaro 2.5 mg, subcutaneous, Every 7 days    OneTouch Delica Plus Lancet 30 gauge misc USE  "TO TEST BLOOD SUGAR AS DIRECTED    OneTouch Ultra Test strip Use 1 strip BID to check glucose    OneTouch Ultra2 Meter misc use 1 to 2 times daily    pen needle, diabetic 32 gauge x 5/32\" needle Use four times a day with insulin use      Vitals  BP Readings from Last 2 Encounters:   11/19/24 122/70   09/05/24 (!) 193/84     Labs  A1C  Lab Results   Component Value Date    HGBA1C 9 (A) 11/19/2024    HGBA1C 11.2 (A) 07/19/2024    HGBA1C 11.9 (H) 04/16/2024     BMP  Lab Results   Component Value Date    CALCIUM 8.8 09/06/2024     09/06/2024    K 4.1 09/06/2024    CO2 27 09/06/2024     (H) 09/06/2024    BUN 29 (H) 09/06/2024    CREATININE 1.68 (H) 09/06/2024    EGFR 35 (L) 09/06/2024     LFTs  Lab Results   Component Value Date    ALT 10 09/06/2024    AST 11 09/06/2024    ALKPHOS 123 09/06/2024    BILITOT 0.4 09/06/2024     FLP  Lab Results   Component Value Date    TRIG 189 (H) 05/08/2023    CHOL 174 05/08/2023    LDLF 98 05/08/2023    HDL 38.5 (A) 05/08/2023     Urine Microalbumin  Lab Results   Component Value Date    MICROALBCREA  11/19/2024      Comment:      One or more analytes used in this calculation is outside of the analytical measurement range. Calculation cannot be performed.     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 Goal A1c <7%.  Patient's SMBGs are above goal, reports typically 's.     Rationale for plan: Patient has taken first dose of Mounjaro 2.5mg and tolerating well so far. No current concerns. Due to recent medication start, plan to continue current regimen and follow-up in " 2-3 weeks to assess if tolerating and titrate if able.    Medication Changes:  CONTINUE  Mounjaro 2.5mg once weekly (Monday, 1 doses completed)  Basaglar 37 units once daily  Admelog SS before meals         Relevant Orders    Referral to Clinical Pharmacy     Patient Education:  Counseled patient on relevant MOA, expectations, side effects, duration of therapy, contraindications, administration, and monitoring parameters.  All questions and concerns addressed. Contact pharmacist with any further questions or concerns prior to next appointment.    Clinical Pharmacist follow-up: 12/31/24 at 1:20pm, Telehealth visit    Thank you,  Amanda Joe, PharmD  Clinical Pharmacist  [phone]    Continue all meds under the continuation of care with the referring provider and clinical pharmacy team.  Verbal consent to manage patient's drug therapy was obtained from the patient. They were informed they may decline to participate or withdraw from participation in pharmacy services at any time.

## 2024-12-20 NOTE — ASSESSMENT & PLAN NOTE
Patient's A1c is 9% on 11/19/24. Goal A1c <7%.  Patient's SMBGs are above goal, reports typically 's.     Rationale for plan: Patient has taken first dose of Mounjaro 2.5mg and tolerating well so far. No current concerns. Due to recent medication start, plan to continue current regimen and follow-up in 2-3 weeks to assess if tolerating and titrate if able.    Medication Changes:  CONTINUE  Mounjaro 2.5mg once weekly (Monday, 1 doses completed)  Basaglar 37 units once daily  Admelog SS before meals

## 2024-12-26 ENCOUNTER — HOSPITAL ENCOUNTER (OUTPATIENT)
Dept: RADIOLOGY | Facility: CLINIC | Age: 59
Discharge: HOME | End: 2024-12-26
Payer: COMMERCIAL

## 2024-12-26 DIAGNOSIS — R92.8 OTHER ABNORMAL AND INCONCLUSIVE FINDINGS ON DIAGNOSTIC IMAGING OF BREAST: ICD-10-CM

## 2024-12-26 PROCEDURE — 76981 USE PARENCHYMA: CPT | Mod: LT

## 2024-12-26 PROCEDURE — 76642 ULTRASOUND BREAST LIMITED: CPT | Mod: LEFT SIDE | Performed by: RADIOLOGY

## 2024-12-26 PROCEDURE — 76642 ULTRASOUND BREAST LIMITED: CPT | Mod: LT

## 2024-12-30 NOTE — PROGRESS NOTES
Clinical Pharmacy Appointment    Patient ID: Nuris Squires is a 59 y.o. female who presents for Diabetes.    Pt is here for Follow Up appointment.     Referring Provider: Mone Aquino MD  PCP: Mone Aquino MD   Last visit with PCP: 11/19/24   Next visit with PCP: 2/20/25    Subjective   Drug Interactions  No relevant drug interactions were noted.    Medication System Management  Patient's preferred pharmacy: [facility]  Adherence/Organization: No concerns  Affordability/Accessibility: No concerns     Patient Assistance for Mounjaro approved through 12/4/25. Will have to be renewed prior to that date to prevent lapse in coverage. Medication(s) will be received at no cost to patient from [facility].     PMHx: CKD, HTN, HLD, PAD/PVD    HPI  DIABETES MELLITUS Type 2:    Follows with Endocrinology?: Yes, scheduled for 01/2025    Pertinent PMH Review  Known diabetic complications:   Chronic Kidney Disease  Peripheral vascular disease  Peripheral neuropathy  Obesity  Hx or FH Hx of MTC/MEN2?: No  Pancreatitis?: No  Gastroparesis?: No  UTI/Yeast Infections?: Yes, hx yeast infections, recent UTI  Retinopathy?: No    Current diabetic medications include:  Mounjaro 2.5mg weekly  Basaglar 37 units once daily  Admelog SS with meals  Blood Glucose Admelog Units   < 80 0   81 - 150 10   151 - 200 12   201 - 250 14   252 - 300 16   301 - 350 18   351 - 400 20   > 400 22 + call provider   Previous medications: metformin, Jardiance (VENKAT)    Clarifications to above regimen: None  Adverse Effects: Sluggish after first dose    Glucose Readings  Glucometer/CGM Type: FreeStyle Remington 3 w/ reader    Previous home BG readings: CGM data not available for review, reports typically in the 200's  Current home BG readings: Between 120-200     Any episodes of hypoglycemia? Yes - today 66, 69, 75   Did patient treat episode of hypoglycemia appropriately? Yes, juice, peppermint , she has not had a large meal  "yet  Does the patient have a prescription for ready-to-use Glucagon? No, not needed    Risk Reducing Medications  Statin? Yes  ACE-I/ARB? Not at this time    Preventative Care  Diabetic Eye Exam: Needs completed, last >1 year ago  Monofilament Foot Exam: Following with podiatry, needs to schedule    Objective   No Known Allergies  Social History     Social History Narrative    Not on file      Medication Review  Current Outpatient Medications   Medication Instructions    acetaminophen (TYLENOL) 650 mg, oral, Every 4 hours PRN    albuterol 90 mcg/actuation inhaler 2 puffs, inhalation, Every 6 hours PRN    amLODIPine (NORVASC) 10 mg, oral, Daily    aspirin 81 mg, oral, Daily RT    atorvastatin (LIPITOR) 40 mg, oral, Daily    b complex vitamins capsule 1 capsule, Daily    Basaglar KwikPen U-100 Insulin 32 Units, subcutaneous, Every morning, Take as directed per insulin instructions.    blood-glucose sensor (BluwanStyle Remington 3 Sensor) device Use to monitor blood glucose. Change sensor every 14 days.    cloNIDine (CATAPRES) 0.1 mg, oral, 2 times daily    folic acid (Folvite) 1 mg tablet 1 tablet, Daily    FreeStyle Remington 3 [place] misc Use as instructed to monitor blood glucose.    gabapentin (NEURONTIN) 300 mg, oral, 2 times daily    insulin lispro (Admelog SoloStar U-100 Insulin) 100 unit/mL injection Inject per sliding scale instructions under the skin 3 times a day with meals. (Max daily dose 70 units)    meclizine (ANTIVERT) 25 mg, oral, 3 times daily PRN    Mounjaro 2.5 mg, subcutaneous, Every 7 days    OneTouch Delica Plus Lancet 30 gauge misc USE TO TEST BLOOD SUGAR AS DIRECTED    OneTouch Ultra Test strip Use 1 strip BID to check glucose    OneTouch Ultra2 Meter misc use 1 to 2 times daily    pen needle, diabetic 32 gauge x 5/32\" needle Use four times a day with insulin use      Vitals  BP Readings from Last 2 Encounters:   11/19/24 122/70   09/05/24 (!) 193/84     BMI Readings from Last 1 Encounters:   11/19/24 " 40.24 kg/m²      Labs  A1C  Lab Results   Component Value Date    HGBA1C 9 (A) 11/19/2024    HGBA1C 11.2 (A) 07/19/2024    HGBA1C 11.9 (H) 04/16/2024     BMP  Lab Results   Component Value Date    CALCIUM 8.8 09/06/2024     09/06/2024    K 4.1 09/06/2024    CO2 27 09/06/2024     (H) 09/06/2024    BUN 29 (H) 09/06/2024    CREATININE 1.68 (H) 09/06/2024    EGFR 35 (L) 09/06/2024     LFTs  Lab Results   Component Value Date    ALT 10 09/06/2024    AST 11 09/06/2024    ALKPHOS 123 09/06/2024    BILITOT 0.4 09/06/2024     FLP  Lab Results   Component Value Date    TRIG 189 (H) 05/08/2023    CHOL 174 05/08/2023    LDLF 98 05/08/2023    HDL 38.5 (A) 05/08/2023     Urine Microalbumin  Lab Results   Component Value Date    MICROALBCREA  11/19/2024      Comment:      One or more analytes used in this calculation is outside of the analytical measurement range. Calculation cannot be performed.     Weight Management  Wt Readings from Last 3 Encounters:   11/19/24 99.8 kg (220 lb)   11/15/24 99.8 kg (220 lb 0.3 oz)   09/05/24 99.8 kg (220 lb)      There is no height or weight on file to calculate BMI.     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  Is pt at goal? No, 9.0%  Patient's SMBGs are between 120-200, unknown if FBG or PPBG.     Rationale for plan: Patient has had low BG this morning. Recommend holding Basaglar for today, and resuming tomorrow unless BG is <80. Recommended eating a large meal to raise BG without Admelog to prevent hypoglycemia, then another large meal with SSI before bed. Since BG is low today, plan to continue Mounjaro for another month for patient tolerability. May plan on decreasing insulin in the future. Will follow-up in 2 weeks to assess for hypoglycemia. Recommended patient reach out to pharmacy if these  episodes occur again.     Medication Changes:  CONTINUE  Mounjaro 2.5mg weekly  Basaglar 37 units every morning  Admelog SS with meals    Monitoring and Education:  Counseled patient on relevant medication mechanisms of action, expectations, side effects, duration of therapy, contraindications, administration, and monitoring parameters.  All questions and concerns addressed. Contact pharmacist with any further questions or concerns prior to next appointment.  Reviewed CGM BG goals: Target range , Time in Range Goal >70%, GMI goal <7%  Reviewed signs, symptoms, and treatment of hypoglycemia.     Clinical Pharmacist follow-up: 1/14/25 at 2:20PM, Telehealth visit    Continue all meds under the continuation of care with the referring provider and clinical pharmacy team.    Thank you,  Consuelo Woodruff  Pharmacy Resident    Verbal consent to manage patient's drug therapy was obtained from the patient. They were informed they may decline to participate or withdraw from participation in pharmacy services at any time.

## 2024-12-31 ENCOUNTER — APPOINTMENT (OUTPATIENT)
Dept: PHARMACY | Facility: HOSPITAL | Age: 59
End: 2024-12-31
Payer: COMMERCIAL

## 2024-12-31 DIAGNOSIS — E11.3511 TYPE 2 DIABETES MELLITUS WITH RIGHT EYE AFFECTED BY PROLIFERATIVE RETINOPATHY AND MACULAR EDEMA, WITH LONG-TERM CURRENT USE OF INSULIN: ICD-10-CM

## 2024-12-31 DIAGNOSIS — Z79.4 TYPE 2 DIABETES MELLITUS WITH RIGHT EYE AFFECTED BY PROLIFERATIVE RETINOPATHY AND MACULAR EDEMA, WITH LONG-TERM CURRENT USE OF INSULIN: ICD-10-CM

## 2024-12-31 RX ORDER — TIRZEPATIDE 2.5 MG/.5ML
2.5 INJECTION, SOLUTION SUBCUTANEOUS
Qty: 2 ML | Refills: 0 | Status: ON HOLD | OUTPATIENT
Start: 2024-12-31

## 2025-01-02 ENCOUNTER — APPOINTMENT (OUTPATIENT)
Dept: RADIOLOGY | Facility: HOSPITAL | Age: 60
End: 2025-01-02
Payer: COMMERCIAL

## 2025-01-02 ENCOUNTER — HOSPITAL ENCOUNTER (INPATIENT)
Facility: HOSPITAL | Age: 60
End: 2025-01-02
Attending: STUDENT IN AN ORGANIZED HEALTH CARE EDUCATION/TRAINING PROGRAM | Admitting: NURSE PRACTITIONER
Payer: COMMERCIAL

## 2025-01-02 ENCOUNTER — CLINICAL SUPPORT (OUTPATIENT)
Dept: EMERGENCY MEDICINE | Facility: HOSPITAL | Age: 60
End: 2025-01-02
Payer: COMMERCIAL

## 2025-01-02 DIAGNOSIS — E66.01 CLASS 3 SEVERE OBESITY WITH BODY MASS INDEX (BMI) OF 40.0 TO 44.9 IN ADULT, UNSPECIFIED OBESITY TYPE, UNSPECIFIED WHETHER SERIOUS COMORBIDITY PRESENT: ICD-10-CM

## 2025-01-02 DIAGNOSIS — R33.9 URINARY RETENTION: ICD-10-CM

## 2025-01-02 DIAGNOSIS — I50.33 ACUTE ON CHRONIC DIASTOLIC (CONGESTIVE) HEART FAILURE: ICD-10-CM

## 2025-01-02 DIAGNOSIS — J81.0 ACUTE PULMONARY EDEMA: ICD-10-CM

## 2025-01-02 DIAGNOSIS — N18.9 CHRONIC KIDNEY DISEASE, UNSPECIFIED CKD STAGE: ICD-10-CM

## 2025-01-02 DIAGNOSIS — Z79.4 TYPE 2 DIABETES MELLITUS WITH RIGHT EYE AFFECTED BY PROLIFERATIVE RETINOPATHY AND MACULAR EDEMA, WITH LONG-TERM CURRENT USE OF INSULIN: ICD-10-CM

## 2025-01-02 DIAGNOSIS — I10 HYPERTENSION, UNSPECIFIED TYPE: ICD-10-CM

## 2025-01-02 DIAGNOSIS — M79.671 RIGHT FOOT PAIN: ICD-10-CM

## 2025-01-02 DIAGNOSIS — J96.01 ACUTE HYPOXIC RESPIRATORY FAILURE (MULTI): Primary | ICD-10-CM

## 2025-01-02 DIAGNOSIS — D64.9 ANEMIA, UNSPECIFIED TYPE: ICD-10-CM

## 2025-01-02 DIAGNOSIS — E78.2 MIXED HYPERLIPIDEMIA: ICD-10-CM

## 2025-01-02 DIAGNOSIS — N17.9 AKI (ACUTE KIDNEY INJURY) (CMS-HCC): ICD-10-CM

## 2025-01-02 DIAGNOSIS — E11.9 TYPE 2 DIABETES MELLITUS WITHOUT COMPLICATION, UNSPECIFIED WHETHER LONG TERM INSULIN USE (MULTI): ICD-10-CM

## 2025-01-02 DIAGNOSIS — E11.3511 TYPE 2 DIABETES MELLITUS WITH RIGHT EYE AFFECTED BY PROLIFERATIVE RETINOPATHY AND MACULAR EDEMA, WITH LONG-TERM CURRENT USE OF INSULIN: ICD-10-CM

## 2025-01-02 DIAGNOSIS — I50.31 ACUTE DIASTOLIC HEART FAILURE: ICD-10-CM

## 2025-01-02 DIAGNOSIS — N18.30 STAGE 3 CHRONIC KIDNEY DISEASE, UNSPECIFIED WHETHER STAGE 3A OR 3B CKD (MULTI): ICD-10-CM

## 2025-01-02 DIAGNOSIS — E66.813 CLASS 3 SEVERE OBESITY WITH BODY MASS INDEX (BMI) OF 40.0 TO 44.9 IN ADULT, UNSPECIFIED OBESITY TYPE, UNSPECIFIED WHETHER SERIOUS COMORBIDITY PRESENT: ICD-10-CM

## 2025-01-02 DIAGNOSIS — I10 BENIGN ESSENTIAL HYPERTENSION: ICD-10-CM

## 2025-01-02 DIAGNOSIS — E87.70 HYPERVOLEMIA, UNSPECIFIED HYPERVOLEMIA TYPE: ICD-10-CM

## 2025-01-02 DIAGNOSIS — R06.02 SHORTNESS OF BREATH: ICD-10-CM

## 2025-01-02 LAB
ALBUMIN SERPL BCP-MCNC: 3.6 G/DL (ref 3.4–5)
ALBUMIN SERPL BCP-MCNC: 3.9 G/DL (ref 3.4–5)
ALP SERPL-CCNC: 114 U/L (ref 33–136)
ALT SERPL W P-5'-P-CCNC: 12 U/L (ref 7–45)
ANION GAP BLDV CALCULATED.4IONS-SCNC: 8 MMOL/L (ref 10–25)
ANION GAP BLDV CALCULATED.4IONS-SCNC: 9 MMOL/L (ref 10–25)
ANION GAP SERPL CALC-SCNC: 13 MMOL/L (ref 10–20)
ANION GAP SERPL CALC-SCNC: 18 MMOL/L (ref 10–20)
AST SERPL W P-5'-P-CCNC: 14 U/L (ref 9–39)
BASE EXCESS BLDV CALC-SCNC: -0.7 MMOL/L (ref -2–3)
BASE EXCESS BLDV CALC-SCNC: -0.8 MMOL/L (ref -2–3)
BASOPHILS # BLD AUTO: 0.06 X10*3/UL (ref 0–0.1)
BASOPHILS NFR BLD AUTO: 1.1 %
BILIRUB SERPL-MCNC: 0.4 MG/DL (ref 0–1.2)
BNP SERPL-MCNC: 376 PG/ML (ref 0–99)
BODY TEMPERATURE: 37 DEGREES CELSIUS
BODY TEMPERATURE: 37 DEGREES CELSIUS
BUN SERPL-MCNC: 29 MG/DL (ref 6–23)
BUN SERPL-MCNC: 29 MG/DL (ref 6–23)
CA-I BLDV-SCNC: 1.24 MMOL/L (ref 1.1–1.33)
CA-I BLDV-SCNC: 1.24 MMOL/L (ref 1.1–1.33)
CALCIUM SERPL-MCNC: 8.8 MG/DL (ref 8.6–10.6)
CALCIUM SERPL-MCNC: 9 MG/DL (ref 8.6–10.6)
CARDIAC TROPONIN I PNL SERPL HS: 21 NG/L (ref 0–34)
CHLORIDE BLDV-SCNC: 111 MMOL/L (ref 98–107)
CHLORIDE BLDV-SCNC: 111 MMOL/L (ref 98–107)
CHLORIDE SERPL-SCNC: 109 MMOL/L (ref 98–107)
CHLORIDE SERPL-SCNC: 111 MMOL/L (ref 98–107)
CO2 SERPL-SCNC: 22 MMOL/L (ref 21–32)
CO2 SERPL-SCNC: 23 MMOL/L (ref 21–32)
CREAT SERPL-MCNC: 2.31 MG/DL (ref 0.5–1.05)
CREAT SERPL-MCNC: 2.34 MG/DL (ref 0.5–1.05)
EGFRCR SERPLBLD CKD-EPI 2021: 23 ML/MIN/1.73M*2
EGFRCR SERPLBLD CKD-EPI 2021: 24 ML/MIN/1.73M*2
EOSINOPHIL # BLD AUTO: 0.09 X10*3/UL (ref 0–0.7)
EOSINOPHIL NFR BLD AUTO: 1.7 %
ERYTHROCYTE [DISTWIDTH] IN BLOOD BY AUTOMATED COUNT: 14 % (ref 11.5–14.5)
ERYTHROCYTE [DISTWIDTH] IN BLOOD BY AUTOMATED COUNT: 14.1 % (ref 11.5–14.5)
FLUAV RNA RESP QL NAA+PROBE: NOT DETECTED
FLUBV RNA RESP QL NAA+PROBE: NOT DETECTED
GLUCOSE BLD MANUAL STRIP-MCNC: 177 MG/DL (ref 74–99)
GLUCOSE BLDV-MCNC: 152 MG/DL (ref 74–99)
GLUCOSE BLDV-MCNC: 159 MG/DL (ref 74–99)
GLUCOSE SERPL-MCNC: 128 MG/DL (ref 74–99)
GLUCOSE SERPL-MCNC: 148 MG/DL (ref 74–99)
HCO3 BLDV-SCNC: 25.4 MMOL/L (ref 22–26)
HCO3 BLDV-SCNC: 25.8 MMOL/L (ref 22–26)
HCT VFR BLD AUTO: 33.1 % (ref 36–46)
HCT VFR BLD AUTO: 33.3 % (ref 36–46)
HCT VFR BLD EST: 34 % (ref 36–46)
HCT VFR BLD EST: 34 % (ref 36–46)
HGB BLD-MCNC: 11.1 G/DL (ref 12–16)
HGB BLD-MCNC: 11.1 G/DL (ref 12–16)
HGB BLDV-MCNC: 11.4 G/DL (ref 12–16)
HGB BLDV-MCNC: 11.4 G/DL (ref 12–16)
HOLD SPECIMEN: NORMAL
IMM GRANULOCYTES # BLD AUTO: 0.02 X10*3/UL (ref 0–0.7)
IMM GRANULOCYTES NFR BLD AUTO: 0.4 % (ref 0–0.9)
INHALED O2 CONCENTRATION: 44 %
LACTATE BLDV-SCNC: 0.7 MMOL/L (ref 0.4–2)
LACTATE BLDV-SCNC: 0.8 MMOL/L (ref 0.4–2)
LYMPHOCYTES # BLD AUTO: 1.49 X10*3/UL (ref 1.2–4.8)
LYMPHOCYTES NFR BLD AUTO: 27.7 %
MAGNESIUM SERPL-MCNC: 2.5 MG/DL (ref 1.6–2.4)
MCH RBC QN AUTO: 30.1 PG (ref 26–34)
MCH RBC QN AUTO: 30.2 PG (ref 26–34)
MCHC RBC AUTO-ENTMCNC: 33.3 G/DL (ref 32–36)
MCHC RBC AUTO-ENTMCNC: 33.5 G/DL (ref 32–36)
MCV RBC AUTO: 90 FL (ref 80–100)
MCV RBC AUTO: 90 FL (ref 80–100)
MONOCYTES # BLD AUTO: 0.32 X10*3/UL (ref 0.1–1)
MONOCYTES NFR BLD AUTO: 5.9 %
NEUTROPHILS # BLD AUTO: 3.4 X10*3/UL (ref 1.2–7.7)
NEUTROPHILS NFR BLD AUTO: 63.2 %
NRBC BLD-RTO: 0 /100 WBCS (ref 0–0)
NRBC BLD-RTO: 0 /100 WBCS (ref 0–0)
OXYHGB MFR BLDV: 54 % (ref 45–75)
OXYHGB MFR BLDV: 68.8 % (ref 45–75)
PCO2 BLDV: 47 MM HG (ref 41–51)
PCO2 BLDV: 50 MM HG (ref 41–51)
PH BLDV: 7.32 PH (ref 7.33–7.43)
PH BLDV: 7.34 PH (ref 7.33–7.43)
PHOSPHATE SERPL-MCNC: 4.2 MG/DL (ref 2.5–4.9)
PLATELET # BLD AUTO: 363 X10*3/UL (ref 150–450)
PLATELET # BLD AUTO: 369 X10*3/UL (ref 150–450)
PO2 BLDV: 41 MM HG (ref 35–45)
PO2 BLDV: 48 MM HG (ref 35–45)
POTASSIUM BLDV-SCNC: 4.6 MMOL/L (ref 3.5–5.3)
POTASSIUM BLDV-SCNC: 4.6 MMOL/L (ref 3.5–5.3)
POTASSIUM SERPL-SCNC: 4.5 MMOL/L (ref 3.5–5.3)
POTASSIUM SERPL-SCNC: 4.6 MMOL/L (ref 3.5–5.3)
PROT SERPL-MCNC: 6.9 G/DL (ref 6.4–8.2)
RBC # BLD AUTO: 3.68 X10*6/UL (ref 4–5.2)
RBC # BLD AUTO: 3.69 X10*6/UL (ref 4–5.2)
RSV RNA RESP QL NAA+PROBE: NOT DETECTED
SAO2 % BLDV: 55 % (ref 45–75)
SAO2 % BLDV: 70 % (ref 45–75)
SARS-COV-2 RNA RESP QL NAA+PROBE: NOT DETECTED
SODIUM BLDV-SCNC: 140 MMOL/L (ref 136–145)
SODIUM BLDV-SCNC: 141 MMOL/L (ref 136–145)
SODIUM SERPL-SCNC: 142 MMOL/L (ref 136–145)
SODIUM SERPL-SCNC: 144 MMOL/L (ref 136–145)
WBC # BLD AUTO: 5.4 X10*3/UL (ref 4.4–11.3)
WBC # BLD AUTO: 5.9 X10*3/UL (ref 4.4–11.3)

## 2025-01-02 PROCEDURE — 1200000002 HC GENERAL ROOM WITH TELEMETRY DAILY

## 2025-01-02 PROCEDURE — 99291 CRITICAL CARE FIRST HOUR: CPT | Performed by: STUDENT IN AN ORGANIZED HEALTH CARE EDUCATION/TRAINING PROGRAM

## 2025-01-02 PROCEDURE — 94660 CPAP INITIATION&MGMT: CPT

## 2025-01-02 PROCEDURE — 87637 SARSCOV2&INF A&B&RSV AMP PRB: CPT

## 2025-01-02 PROCEDURE — 84132 ASSAY OF SERUM POTASSIUM: CPT | Performed by: STUDENT IN AN ORGANIZED HEALTH CARE EDUCATION/TRAINING PROGRAM

## 2025-01-02 PROCEDURE — 2500000001 HC RX 250 WO HCPCS SELF ADMINISTERED DRUGS (ALT 637 FOR MEDICARE OP): Performed by: NURSE PRACTITIONER

## 2025-01-02 PROCEDURE — 85027 COMPLETE CBC AUTOMATED: CPT | Performed by: NURSE PRACTITIONER

## 2025-01-02 PROCEDURE — 84484 ASSAY OF TROPONIN QUANT: CPT | Performed by: STUDENT IN AN ORGANIZED HEALTH CARE EDUCATION/TRAINING PROGRAM

## 2025-01-02 PROCEDURE — 84132 ASSAY OF SERUM POTASSIUM: CPT

## 2025-01-02 PROCEDURE — 36415 COLL VENOUS BLD VENIPUNCTURE: CPT

## 2025-01-02 PROCEDURE — 93005 ELECTROCARDIOGRAM TRACING: CPT

## 2025-01-02 PROCEDURE — 2500000001 HC RX 250 WO HCPCS SELF ADMINISTERED DRUGS (ALT 637 FOR MEDICARE OP)

## 2025-01-02 PROCEDURE — 84132 ASSAY OF SERUM POTASSIUM: CPT | Performed by: NURSE PRACTITIONER

## 2025-01-02 PROCEDURE — 85025 COMPLETE CBC W/AUTO DIFF WBC: CPT | Performed by: STUDENT IN AN ORGANIZED HEALTH CARE EDUCATION/TRAINING PROGRAM

## 2025-01-02 PROCEDURE — 2500000004 HC RX 250 GENERAL PHARMACY W/ HCPCS (ALT 636 FOR OP/ED): Performed by: STUDENT IN AN ORGANIZED HEALTH CARE EDUCATION/TRAINING PROGRAM

## 2025-01-02 PROCEDURE — 2500000004 HC RX 250 GENERAL PHARMACY W/ HCPCS (ALT 636 FOR OP/ED): Performed by: NURSE PRACTITIONER

## 2025-01-02 PROCEDURE — 93308 TTE F-UP OR LMTD: CPT

## 2025-01-02 PROCEDURE — 71045 X-RAY EXAM CHEST 1 VIEW: CPT

## 2025-01-02 PROCEDURE — 96374 THER/PROPH/DIAG INJ IV PUSH: CPT | Mod: 59

## 2025-01-02 PROCEDURE — 83735 ASSAY OF MAGNESIUM: CPT | Performed by: NURSE PRACTITIONER

## 2025-01-02 PROCEDURE — 93308 TTE F-UP OR LMTD: CPT | Performed by: STUDENT IN AN ORGANIZED HEALTH CARE EDUCATION/TRAINING PROGRAM

## 2025-01-02 PROCEDURE — 99222 1ST HOSP IP/OBS MODERATE 55: CPT | Performed by: STUDENT IN AN ORGANIZED HEALTH CARE EDUCATION/TRAINING PROGRAM

## 2025-01-02 PROCEDURE — 71045 X-RAY EXAM CHEST 1 VIEW: CPT | Mod: FOREIGN READ | Performed by: RADIOLOGY

## 2025-01-02 PROCEDURE — 83880 ASSAY OF NATRIURETIC PEPTIDE: CPT | Performed by: STUDENT IN AN ORGANIZED HEALTH CARE EDUCATION/TRAINING PROGRAM

## 2025-01-02 PROCEDURE — 82947 ASSAY GLUCOSE BLOOD QUANT: CPT

## 2025-01-02 PROCEDURE — 2500000005 HC RX 250 GENERAL PHARMACY W/O HCPCS: Performed by: STUDENT IN AN ORGANIZED HEALTH CARE EDUCATION/TRAINING PROGRAM

## 2025-01-02 PROCEDURE — 93010 ELECTROCARDIOGRAM REPORT: CPT | Performed by: STUDENT IN AN ORGANIZED HEALTH CARE EDUCATION/TRAINING PROGRAM

## 2025-01-02 PROCEDURE — 36415 COLL VENOUS BLD VENIPUNCTURE: CPT | Performed by: STUDENT IN AN ORGANIZED HEALTH CARE EDUCATION/TRAINING PROGRAM

## 2025-01-02 PROCEDURE — 83036 HEMOGLOBIN GLYCOSYLATED A1C: CPT | Performed by: STUDENT IN AN ORGANIZED HEALTH CARE EDUCATION/TRAINING PROGRAM

## 2025-01-02 RX ORDER — CLONIDINE HYDROCHLORIDE 0.2 MG/1
0.1 TABLET ORAL ONCE
Status: COMPLETED | OUTPATIENT
Start: 2025-01-02 | End: 2025-01-02

## 2025-01-02 RX ORDER — FUROSEMIDE 10 MG/ML
40 INJECTION INTRAMUSCULAR; INTRAVENOUS ONCE
Status: COMPLETED | OUTPATIENT
Start: 2025-01-02 | End: 2025-01-02

## 2025-01-02 RX ORDER — NAPROXEN SODIUM 220 MG/1
81 TABLET, FILM COATED ORAL
Status: CANCELLED | OUTPATIENT
Start: 2025-01-02

## 2025-01-02 RX ORDER — ATORVASTATIN CALCIUM 20 MG/1
40 TABLET, FILM COATED ORAL DAILY
Status: CANCELLED | OUTPATIENT
Start: 2025-01-02

## 2025-01-02 RX ORDER — INSULIN GLARGINE 100 [IU]/ML
37 INJECTION, SOLUTION SUBCUTANEOUS DAILY
Status: DISCONTINUED | OUTPATIENT
Start: 2025-01-03 | End: 2025-01-02

## 2025-01-02 RX ORDER — ACETAMINOPHEN 325 MG/1
650 TABLET ORAL EVERY 4 HOURS PRN
Status: CANCELLED | OUTPATIENT
Start: 2025-01-02

## 2025-01-02 RX ORDER — NITROGLYCERIN 0.4 MG/1
0.4 TABLET SUBLINGUAL ONCE
Status: COMPLETED | OUTPATIENT
Start: 2025-01-02 | End: 2025-01-02

## 2025-01-02 RX ORDER — ACETAMINOPHEN 325 MG/1
650 TABLET ORAL EVERY 6 HOURS PRN
Status: DISCONTINUED | OUTPATIENT
Start: 2025-01-02 | End: 2025-01-23 | Stop reason: HOSPADM

## 2025-01-02 RX ORDER — DEXTROSE 50 % IN WATER (D50W) INTRAVENOUS SYRINGE
12.5
Status: DISCONTINUED | OUTPATIENT
Start: 2025-01-02 | End: 2025-01-02 | Stop reason: SDUPTHER

## 2025-01-02 RX ORDER — CLONIDINE HYDROCHLORIDE 0.1 MG/1
0.1 TABLET ORAL 2 TIMES DAILY
Status: DISCONTINUED | OUTPATIENT
Start: 2025-01-02 | End: 2025-01-03

## 2025-01-02 RX ORDER — MECLIZINE HCL 25MG 25 MG/1
25 TABLET, CHEWABLE ORAL 3 TIMES DAILY PRN
Status: CANCELLED | OUTPATIENT
Start: 2025-01-02

## 2025-01-02 RX ORDER — GABAPENTIN 300 MG/1
300 CAPSULE ORAL 2 TIMES DAILY
Status: DISCONTINUED | OUTPATIENT
Start: 2025-01-02 | End: 2025-01-23 | Stop reason: HOSPADM

## 2025-01-02 RX ORDER — FUROSEMIDE 20 MG/1
20 TABLET ORAL DAILY
COMMUNITY
End: 2025-01-23 | Stop reason: HOSPADM

## 2025-01-02 RX ORDER — POLYETHYLENE GLYCOL 3350 17 G/17G
17 POWDER, FOR SOLUTION ORAL DAILY
Status: DISCONTINUED | OUTPATIENT
Start: 2025-01-02 | End: 2025-01-23 | Stop reason: HOSPADM

## 2025-01-02 RX ORDER — FOLIC ACID 1 MG/1
1 TABLET ORAL DAILY
Status: DISCONTINUED | OUTPATIENT
Start: 2025-01-03 | End: 2025-01-23 | Stop reason: HOSPADM

## 2025-01-02 RX ORDER — HEPARIN SODIUM 5000 [USP'U]/ML
7500 INJECTION, SOLUTION INTRAVENOUS; SUBCUTANEOUS EVERY 8 HOURS SCHEDULED
Status: DISCONTINUED | OUTPATIENT
Start: 2025-01-02 | End: 2025-01-23 | Stop reason: HOSPADM

## 2025-01-02 RX ORDER — ALBUTEROL SULFATE 0.83 MG/ML
2.5 SOLUTION RESPIRATORY (INHALATION) EVERY 6 HOURS PRN
Status: DISCONTINUED | OUTPATIENT
Start: 2025-01-02 | End: 2025-01-23 | Stop reason: HOSPADM

## 2025-01-02 RX ORDER — GABAPENTIN 300 MG/1
300 CAPSULE ORAL 2 TIMES DAILY
Status: CANCELLED | OUTPATIENT
Start: 2025-01-02

## 2025-01-02 RX ORDER — HYDRALAZINE HYDROCHLORIDE 20 MG/ML
5 INJECTION INTRAMUSCULAR; INTRAVENOUS ONCE
Status: COMPLETED | OUTPATIENT
Start: 2025-01-02 | End: 2025-01-02

## 2025-01-02 RX ORDER — DEXTROSE 50 % IN WATER (D50W) INTRAVENOUS SYRINGE
12.5
Status: DISCONTINUED | OUTPATIENT
Start: 2025-01-02 | End: 2025-01-23 | Stop reason: HOSPADM

## 2025-01-02 RX ORDER — DEXTROSE 50 % IN WATER (D50W) INTRAVENOUS SYRINGE
25
Status: DISCONTINUED | OUTPATIENT
Start: 2025-01-02 | End: 2025-01-02 | Stop reason: SDUPTHER

## 2025-01-02 RX ORDER — FOLIC ACID 1 MG/1
1 TABLET ORAL DAILY
Status: CANCELLED | OUTPATIENT
Start: 2025-01-02

## 2025-01-02 RX ORDER — ENOXAPARIN SODIUM 100 MG/ML
40 INJECTION SUBCUTANEOUS DAILY
Status: DISCONTINUED | OUTPATIENT
Start: 2025-01-02 | End: 2025-01-02

## 2025-01-02 RX ORDER — DEXTROSE 50 % IN WATER (D50W) INTRAVENOUS SYRINGE
25
Status: DISCONTINUED | OUTPATIENT
Start: 2025-01-02 | End: 2025-01-23 | Stop reason: HOSPADM

## 2025-01-02 RX ORDER — AMLODIPINE BESYLATE 10 MG/1
10 TABLET ORAL DAILY
Status: DISCONTINUED | OUTPATIENT
Start: 2025-01-02 | End: 2025-01-04

## 2025-01-02 RX ORDER — HYDRALAZINE HYDROCHLORIDE 20 MG/ML
5 INJECTION INTRAMUSCULAR; INTRAVENOUS EVERY 6 HOURS PRN
Status: DISCONTINUED | OUTPATIENT
Start: 2025-01-02 | End: 2025-01-10

## 2025-01-02 RX ORDER — NITROGLYCERIN 20 MG/100ML
5-200 INJECTION INTRAVENOUS CONTINUOUS
Status: DISCONTINUED | OUTPATIENT
Start: 2025-01-02 | End: 2025-01-02

## 2025-01-02 RX ORDER — INSULIN GLARGINE 100 [IU]/ML
32 INJECTION, SOLUTION SUBCUTANEOUS DAILY
Status: DISCONTINUED | OUTPATIENT
Start: 2025-01-03 | End: 2025-01-05

## 2025-01-02 RX ORDER — INSULIN LISPRO 100 [IU]/ML
0-10 INJECTION, SOLUTION INTRAVENOUS; SUBCUTANEOUS
Status: DISCONTINUED | OUTPATIENT
Start: 2025-01-02 | End: 2025-01-23 | Stop reason: HOSPADM

## 2025-01-02 RX ADMIN — Medication 3 L/MIN: at 15:15

## 2025-01-02 RX ADMIN — HEPARIN SODIUM 7500 UNITS: 5000 INJECTION INTRAVENOUS; SUBCUTANEOUS at 20:51

## 2025-01-02 RX ADMIN — CLONIDINE HYDROCHLORIDE 0.1 MG: 0.1 TABLET ORAL at 20:51

## 2025-01-02 RX ADMIN — FUROSEMIDE 40 MG: 10 INJECTION, SOLUTION INTRAMUSCULAR; INTRAVENOUS at 17:50

## 2025-01-02 RX ADMIN — FUROSEMIDE 40 MG: 10 INJECTION, SOLUTION INTRAMUSCULAR; INTRAVENOUS at 13:01

## 2025-01-02 RX ADMIN — CLONIDINE HYDROCHLORIDE 0.1 MG: 0.2 TABLET ORAL at 15:13

## 2025-01-02 RX ADMIN — AMLODIPINE BESYLATE 10 MG: 10 TABLET ORAL at 15:13

## 2025-01-02 RX ADMIN — GABAPENTIN 300 MG: 300 CAPSULE ORAL at 20:51

## 2025-01-02 RX ADMIN — NITROGLYCERIN 0.4 MG: 0.4 TABLET SUBLINGUAL at 13:13

## 2025-01-02 RX ADMIN — HYDRALAZINE HYDROCHLORIDE 5 MG: 20 INJECTION, SOLUTION INTRAMUSCULAR; INTRAVENOUS at 17:50

## 2025-01-02 SDOH — SOCIAL STABILITY: SOCIAL INSECURITY: WITHIN THE LAST YEAR, HAVE YOU BEEN AFRAID OF YOUR PARTNER OR EX-PARTNER?: NO

## 2025-01-02 SDOH — SOCIAL STABILITY: SOCIAL INSECURITY
WITHIN THE LAST YEAR, HAVE YOU BEEN RAPED OR FORCED TO HAVE ANY KIND OF SEXUAL ACTIVITY BY YOUR PARTNER OR EX-PARTNER?: NO

## 2025-01-02 SDOH — HEALTH STABILITY: PHYSICAL HEALTH: ON AVERAGE, HOW MANY DAYS PER WEEK DO YOU ENGAGE IN MODERATE TO STRENUOUS EXERCISE (LIKE A BRISK WALK)?: 2 DAYS

## 2025-01-02 SDOH — SOCIAL STABILITY: SOCIAL INSECURITY: ARE THERE ANY APPARENT SIGNS OF INJURIES/BEHAVIORS THAT COULD BE RELATED TO ABUSE/NEGLECT?: NO

## 2025-01-02 SDOH — SOCIAL STABILITY: SOCIAL INSECURITY: ABUSE: ADULT

## 2025-01-02 SDOH — SOCIAL STABILITY: SOCIAL INSECURITY: DOES ANYONE TRY TO KEEP YOU FROM HAVING/CONTACTING OTHER FRIENDS OR DOING THINGS OUTSIDE YOUR HOME?: NO

## 2025-01-02 SDOH — SOCIAL STABILITY: SOCIAL INSECURITY: WERE YOU ABLE TO COMPLETE ALL THE BEHAVIORAL HEALTH SCREENINGS?: YES

## 2025-01-02 SDOH — SOCIAL STABILITY: SOCIAL INSECURITY: HAVE YOU HAD ANY THOUGHTS OF HARMING ANYONE ELSE?: NO

## 2025-01-02 SDOH — ECONOMIC STABILITY: FOOD INSECURITY: WITHIN THE PAST 12 MONTHS, THE FOOD YOU BOUGHT JUST DIDN'T LAST AND YOU DIDN'T HAVE MONEY TO GET MORE.: NEVER TRUE

## 2025-01-02 SDOH — ECONOMIC STABILITY: FOOD INSECURITY: WITHIN THE PAST 12 MONTHS, YOU WORRIED THAT YOUR FOOD WOULD RUN OUT BEFORE YOU GOT THE MONEY TO BUY MORE.: NEVER TRUE

## 2025-01-02 SDOH — SOCIAL STABILITY: SOCIAL INSECURITY: DO YOU FEEL UNSAFE GOING BACK TO THE PLACE WHERE YOU ARE LIVING?: NO

## 2025-01-02 SDOH — ECONOMIC STABILITY: INCOME INSECURITY: IN THE PAST 12 MONTHS HAS THE ELECTRIC, GAS, OIL, OR WATER COMPANY THREATENED TO SHUT OFF SERVICES IN YOUR HOME?: NO

## 2025-01-02 SDOH — SOCIAL STABILITY: SOCIAL INSECURITY: WITHIN THE LAST YEAR, HAVE YOU BEEN HUMILIATED OR EMOTIONALLY ABUSED IN OTHER WAYS BY YOUR PARTNER OR EX-PARTNER?: NO

## 2025-01-02 SDOH — SOCIAL STABILITY: SOCIAL INSECURITY: HAS ANYONE EVER THREATENED TO HURT YOUR FAMILY OR YOUR PETS?: NO

## 2025-01-02 SDOH — SOCIAL STABILITY: SOCIAL INSECURITY: ARE YOU OR HAVE YOU BEEN THREATENED OR ABUSED PHYSICALLY, EMOTIONALLY, OR SEXUALLY BY ANYONE?: NO

## 2025-01-02 SDOH — HEALTH STABILITY: PHYSICAL HEALTH: ON AVERAGE, HOW MANY MINUTES DO YOU ENGAGE IN EXERCISE AT THIS LEVEL?: 20 MIN

## 2025-01-02 SDOH — SOCIAL STABILITY: SOCIAL INSECURITY: DO YOU FEEL ANYONE HAS EXPLOITED OR TAKEN ADVANTAGE OF YOU FINANCIALLY OR OF YOUR PERSONAL PROPERTY?: NO

## 2025-01-02 SDOH — SOCIAL STABILITY: SOCIAL INSECURITY: HAVE YOU HAD THOUGHTS OF HARMING ANYONE ELSE?: NO

## 2025-01-02 SDOH — ECONOMIC STABILITY: FOOD INSECURITY: HOW HARD IS IT FOR YOU TO PAY FOR THE VERY BASICS LIKE FOOD, HOUSING, MEDICAL CARE, AND HEATING?: NOT HARD AT ALL

## 2025-01-02 ASSESSMENT — ENCOUNTER SYMPTOMS
COUGH: 1
FALLS: 0
MYALGIAS: 0
FLANK PAIN: 0
LIGHT-HEADEDNESS: 0
FEVER: 0
ORTHOPNEA: 1
POLYDIPSIA: 0
POLYPHAGIA: 0
VOMITING: 0
ABDOMINAL PAIN: 0
BLOATING: 0
DYSURIA: 0
BRUISES/BLEEDS EASILY: 0
SUSPICIOUS LESIONS: 0
NAUSEA: 0
HEADACHES: 1
BACK PAIN: 1
HOARSE VOICE: 0
HEMATOCHEZIA: 0
DOUBLE VISION: 0
DYSPNEA ON EXERTION: 1
DIAPHORESIS: 0
CHILLS: 0
ALTERED MENTAL STATUS: 0
SHORTNESS OF BREATH: 0
SPUTUM PRODUCTION: 0
HEMOPTYSIS: 0
HEMATEMESIS: 0
HEMATURIA: 0
SORE THROAT: 0
DIARRHEA: 0
WHEEZING: 0
PND: 1
SYNCOPE: 0
SLEEP DISTURBANCES DUE TO BREATHING: 1

## 2025-01-02 ASSESSMENT — ACTIVITIES OF DAILY LIVING (ADL)
GROOMING: INDEPENDENT
PATIENT'S MEMORY ADEQUATE TO SAFELY COMPLETE DAILY ACTIVITIES?: YES
JUDGMENT_ADEQUATE_SAFELY_COMPLETE_DAILY_ACTIVITIES: YES
ASSISTIVE_DEVICE: WALKER
WALKS IN HOME: NEEDS ASSISTANCE
BATHING: NEEDS ASSISTANCE
DRESSING YOURSELF: INDEPENDENT
HEARING - RIGHT EAR: FUNCTIONAL
FEEDING YOURSELF: INDEPENDENT
HEARING - LEFT EAR: FUNCTIONAL
TOILETING: NEEDS ASSISTANCE
ADEQUATE_TO_COMPLETE_ADL: YES
LACK_OF_TRANSPORTATION: NO

## 2025-01-02 ASSESSMENT — LIFESTYLE VARIABLES
TOTAL SCORE: 0
AUDIT-C TOTAL SCORE: 1
HOW OFTEN DO YOU HAVE 6 OR MORE DRINKS ON ONE OCCASION: NEVER
HAVE PEOPLE ANNOYED YOU BY CRITICIZING YOUR DRINKING: NO
EVER HAD A DRINK FIRST THING IN THE MORNING TO STEADY YOUR NERVES TO GET RID OF A HANGOVER: NO
AUDIT-C TOTAL SCORE: 1
HOW OFTEN DO YOU HAVE A DRINK CONTAINING ALCOHOL: MONTHLY OR LESS
HAVE YOU EVER FELT YOU SHOULD CUT DOWN ON YOUR DRINKING: NO
SKIP TO QUESTIONS 9-10: 1
HOW MANY STANDARD DRINKS CONTAINING ALCOHOL DO YOU HAVE ON A TYPICAL DAY: 1 OR 2
EVER FELT BAD OR GUILTY ABOUT YOUR DRINKING: NO

## 2025-01-02 ASSESSMENT — PAIN - FUNCTIONAL ASSESSMENT
PAIN_FUNCTIONAL_ASSESSMENT: 0-10
PAIN_FUNCTIONAL_ASSESSMENT: 0-10

## 2025-01-02 ASSESSMENT — COGNITIVE AND FUNCTIONAL STATUS - GENERAL
MOVING FROM LYING ON BACK TO SITTING ON SIDE OF FLAT BED WITH BEDRAILS: A LITTLE
MOBILITY SCORE: 18
CLIMB 3 TO 5 STEPS WITH RAILING: A LITTLE
STANDING UP FROM CHAIR USING ARMS: A LITTLE
WALKING IN HOSPITAL ROOM: A LITTLE
DRESSING REGULAR LOWER BODY CLOTHING: A LITTLE
DAILY ACTIVITIY SCORE: 20
PERSONAL GROOMING: A LITTLE
TURNING FROM BACK TO SIDE WHILE IN FLAT BAD: A LITTLE
PATIENT BASELINE BEDBOUND: NO
HELP NEEDED FOR BATHING: A LITTLE
TOILETING: A LITTLE
MOVING TO AND FROM BED TO CHAIR: A LITTLE

## 2025-01-02 ASSESSMENT — PAIN SCALES - GENERAL
PAINLEVEL_OUTOF10: 0 - NO PAIN
PAINLEVEL_OUTOF10: 0 - NO PAIN

## 2025-01-02 NOTE — ED TRIAGE NOTES
Pt presents to ED for hypoxia. Pt states she has a pulse ox reader at home and noticed her O2 was low, came to ED for evaluation. Pt endorses SOB, denies CP, HA, dizziness. Pt denies home O2 use.    Pt 61% RA in triage, placed on 6L NC and bumped up to 94%. Pt states she did not take her BP meds today.

## 2025-01-02 NOTE — ED PROVIDER NOTES
History of Present Illness   History provided by: Patient and Family Member  Limitations to History: Respiratory Distress  External Records Reviewed with Brief Summary: Outpatient progress note from 11/19/2024 with the patient's primary care provider which shows chronic medical conditions including hypertension, hyperlipidemia and type 2 diabetes being managed with medications.  The patient is on amlodipine and clonidine for her blood pressure medications.    HPI:  Nuris Squires is a 59 y.o. female with a past medical history of type 2 diabetes, hyperlipidemia, PAD, CKD stage IV, and hypertension that presents the emergency department today for shortness of breath.  The patient presented in acute respiratory distress and was initially saturating 61% on room air prior to administration of supplemental oxygen.  She does not wear oxygen at home.  She states that she began having symptoms yesterday with shortness of breath as well as some worsening bilateral lower extremity swelling.  She did complain of some mild chest tightness that was located centrally but did not radiate to her arms, jaw or back.  She has had a mild productive cough but no hemoptysis, fevers, chills, body aches or any other infectious symptoms.  She has not had any significant abdominal pain, nausea/vomiting/diarrhea, urinary symptoms, headaches, focal weakness, dizziness/lightheadedness, syncopal episodes, palpitations or any other concerning symptoms.  The patient states that she has been taking Lasix for the past few days which was prescribed by her primary care doctor.  She states that she did not take her blood pressure medications this morning.    Physical Exam   Triage vitals:  T 36.8 °C (98.2 °F)  HR 86  BP (!) 220/98  RR 16  O2 94 % Supplemental oxygen    Vital signs reviewed in nursing triage note, EMR flow sheets, and at patient's bedside.   General: Awake, alert, ill-appearing.  In mild respiratory distress.  Eyes: Gaze conjugate.   No scleral icterus or injection  HENT: Normo-cephalic, atraumatic. No stridor. No rhinorrhea or epistaxis.  CV: Regular rate and rhythm. No murmurs appreciated. Radial pulses 2+ bilaterally  Respiratory: Tachypneic with increased work of breathing and speaking in 3 word sentences.  Diminished breath sounds throughout secondary to body habitus but occasional faint rales heard.  GI: Obese abdomen that is soft and non-tender. No rebound or guarding.  MSK/Extremities: No gross bony deformities. Moving all extremities.  2+ bilateral lower extremity pitting edema  Skin: Warm. Appropriate color  Neuro: Alert. Oriented. Face symmetric. Speech is fluent.  Gross strength and sensation intact in b/l UE and LEs  Psych: Appropriate mood and affect      Medical Decision Making & ED Course   Medical Decision Makin y.o. female with the above-stated past medical history that presents to the emergency department for respiratory distress.  Upon arrival to the emergency department the patient had initial oxygen saturation of 61% on room air and was tachypneic.  She was placed on 6 L nasal cannula in triage with improvement into the mid 90s.  She also was hypertensive with initial blood pressure of 220/98 but had otherwise stable vital signs, was afebrile and saturating well on room air.  History and physical exam are as above but notable for an ill-appearing patient and in mild respiratory distress on my initial examination.  Given the patient's hypertension and signs of respiratory distress, I am concerned about SCAPE and will provided patient with a dose of sublingual nitroglycerin and start her on BiPAP.  There also appears to be a component of chronic fluid overload as she has had increasing swelling in her legs and does appear to be volume up on exam.  The patient was started on IV Lasix for further diuresis. Blood pressure did improve significantly with the administration of the sublingual nitroglycerin and will not need a  nitro drip.  Her home blood pressure medications were also reordered.  I have less suspicion for pulmonary embolism based on the patient's physical exam as CHF exacerbation is much more likely and do not feel that CT imaging is required at this time.  The patient is EKG did not show any evidence of ischemia or concern for right heart strain and troponin was negative therefore have low suspicion for ACS.  Chest x-ray and point-of-care ultrasound both showed evidence of fluid overload and BNP was elevated consistent with CHF exacerbation.  The patient does have evidence of an VENKAT which is likely secondary to cardiorenal syndrome and would benefit from diuresis.  She was able to be weaned off of BiPAP onto nasal cannula and is appropriate for admission to the general medical floor.  The patient was discussed with the admissions coordinator who accepted the patient to the cardiology service.  A bed request under Dr. Drake was placed and the patient was admitted in stable condition.    Differential diagnoses considered include but are not limited to: CHF exacerbation, scape, PE, pleural effusions, pneumothorax, asthma/COPD exacerbation, COVID, flu, RSV, ACS, aortic dissection, pneumonia    ED Course:  ED Course as of 01/04/25 1041   Thu Jan 02, 2025   1237 XR chest 1 view  On my interpretation there is right sided pleural effusion and pulmonary edema  [FN]   1250 ECG 12 lead [FN]   1254 ECG 12 lead  I independently interpreted the ECG:  Regular rate. Regular rhythm.  Rightward axis  Normal OK, QRS, and Qtc intervals  No significant ST segment elevations, depressions  Not ischemic [FN]   1305 BP improving since starting BiPAP and getting Lasix [FN]   1439 Creatinine(!): 2.34  VENKAT [FN]   1443 IS MAKING URINE, SO FAR APPORX 700ML [FN]      ED Course User Index  [FN] Fabián March MD         Diagnoses as of 01/04/25 1041   Acute pulmonary edema   VENKAT (acute kidney injury) (CMS-[facility])   Acute hypoxic respiratory failure  (Multi)   Hypervolemia, unspecified hypervolemia type        Social Determinants of Health which Significantly Impact Care: None identified     EKG Independent Interpretation: EKG interpreted by myself. Please see ED Course for full interpretation.    Independent Result Review and Interpretation: Relevant laboratory and radiographic results were reviewed and independently interpreted by myself.  As necessary, they are commented on in the ED Course.    Chronic conditions affecting the patient's care: As documented in the HPI    The patient was discussed with the following consultants/services: Admission Coordinator who accepted the patient for admission    Care Considerations: As documented above in MDM    Disposition   As a result of their workup, the patient will require admission to the hospital.  The patient was informed of her diagnosis.  The patient was given the opportunity to ask questions and I answered them. The patient agreed to be admitted to the hospital.    Procedures   Procedures    Patient seen and discussed with ED attending physician.    Valdo Whatley DO   Emergency Medicine, PGY-2     Disclaimer: This note was dictated by speech recognition. Minor errors in transcription may be present.     [unfilled] ? SmartLinks last updated 1/2/2025 12:20 PM        Valdo Whatley DO  Resident  01/04/25 1111

## 2025-01-02 NOTE — PROGRESS NOTES
Pharmacy Medication History Review    Nuris Squires is a 59 y.o. female admitted for Acute hypoxic respiratory failure (Multi). Pharmacy reviewed the patient's [id number] medications and allergies for accuracy.    Medications ADDED:  N/A  Medications CHANGED:  N/A  Medications REMOVED:   N/A     The list below reflects the updated PTA list.   Prior to Admission Medications   Prescriptions Last Dose Informant   FreeStyle Remington 3 [place] misc  Self   Sig: Use as instructed to monitor blood glucose.   OneTouch Delica Plus Lancet 30 gauge misc  Self   Sig: USE TO TEST BLOOD SUGAR AS DIRECTED   OneTouch Ultra Test strip  Self   Sig: Use 1 strip BID to check glucose   OneTouch Ultra2 Meter misc  Self   Sig: use 1 to 2 times daily   acetaminophen (Tylenol) 325 mg tablet Past Month Self   Sig: Take 2 tablets (650 mg) by mouth every 4 hours if needed for mild pain (1 - 3).   albuterol 90 mcg/actuation inhaler Past Week Self   Sig: Inhale 2 puffs every 6 hours if needed for wheezing.   amLODIPine (Norvasc) 10 mg tablet 1/1/2025 Self   Sig: Take 1 tablet (10 mg) by mouth once daily.   aspirin 81 mg chewable tablet 1/1/2025 Noon Self   Sig: Chew 1 tablet (81 mg) once daily.   atorvastatin (Lipitor) 40 mg tablet 1/1/2025 Self   Sig: Take 1 tablet (40 mg) by mouth once daily.   b complex vitamins capsule 1/1/2025 Self   Sig: Take 1 capsule by mouth once daily.   blood-glucose sensor (FreeStyle Remington 3 Sensor) device  Self   Sig: Use to monitor blood glucose. Change sensor every 14 days.   cloNIDine (Catapres) 0.1 mg tablet 1/1/2025 Self   Sig: Take 1 tablet (0.1 mg) by mouth 2 times a day.   folic acid (Folvite) 1 mg tablet 1/1/2025 Self   Sig: Take 1 tablet (1 mg) by mouth once daily.   gabapentin (Neurontin) 300 mg capsule 1/1/2025 Self   Sig: Take 1 capsule (300 mg) by mouth 2 times a day.   insulin glargine (Basaglar KwikPen U-100 Insulin) 100 unit/mL (3 mL) pen 12/31/2024 Self   Sig: Inject 32 Units under the skin  "once daily in the morning. Take as directed per insulin instructions.   insulin lispro (Admelog SoloStar U-100 Insulin) 100 unit/mL injection 12/31/2024 Self   Sig: Inject per sliding scale instructions under the skin 3 times a day with meals. (Max daily dose 70 units)   meclizine (Antivert) 25 mg tablet,chewable Not Taking Self   Sig: Chew 1 tablet (25 mg) 3 times a day as needed (vertigo).   Patient not taking: Reported on 1/2/2025   pen needle, diabetic 32 gauge x 5/32\" needle  Self   Sig: Use four times a day with insulin use   tirzepatide (Mounjaro) 2.5 mg/0.5 mL pen injector 12/30/2024 Self   Sig: Inject 2.5 mg under the skin every 7 days.  Patient takes every Monday.      Furosemide 20mg tablet  1 tablet by mouth once daily        The list below reflects the updated allergy list. Please review each documented allergy for additional clarification and justification.  Allergies  Reviewed by Kallie Gross on 1/2/2025   No Known Allergies         Patient accepts M2B at discharge.     Sources:   [facility]  Pharmacy dispense history  Patient interview Good historian   PCP office visit note 11/19  Telehealth pharmacy office visit note 12/17    Additional Comments:  N/A      KALLIE GROSS  Pharmacy Technician  01/02/25     Secure Chat preferred   If no response call [phone] or [employer] \"Med Rec\"   "

## 2025-01-02 NOTE — ED PROCEDURE NOTE
Procedure  Critical Care    Performed by: Fabián March MD  Authorized by: Fabián March MD    Critical care provider statement:     Critical care time (minutes):  38    Critical care time was exclusive of:  Separately billable procedures and treating other patients and teaching time    Critical care was necessary to treat or prevent imminent or life-threatening deterioration of the following conditions:  Respiratory failure    Critical care was time spent personally by me on the following activities:  Obtaining history from patient or surrogate, examination of patient, re-evaluation of patient's condition, review of old charts, pulse oximetry, ordering and review of radiographic studies, ordering and review of laboratory studies, ordering and performing treatments and interventions, blood draw for specimens and development of treatment plan with patient or surrogate  Comments:      Vital organ system involved: Respiratory failure    Critical interventions provided: BiPAP  See ED course, ED provider note, and my progress note  Patient in acute respiratory distress.  On 6 L nasal cannula with O2 saturation 94%, continues to be tachypneic, speaking 1 or 2 words at a time.  Significant concern for volume overload on exam given significant leg edema bilaterally.  ED team beginning BiPAP and giving IV Lasix.               Fabián March MD  01/02/25 [id number]

## 2025-01-02 NOTE — Clinical Note
Sheath was inserted in the right internal jugular vein. Ultrasound guidance was used. SMAK exchanged for 7fr sheath

## 2025-01-02 NOTE — H&P
History Of Present Illness:    Nuris Squires is a 59 y.o. female presenting with PMH of poorly controlled insulin dependent type 2 diabetes c/b neuropathy, hyperlipidemia, PAD, CKD stage IV, hypertension, multiple foot infections s/p partial R great toe amputation, fall w/L femur fracture s/p ORIF (4/2024) that presents to the emergency department today for shortness of breath x1 day.    Reports waking up in the middle of the night gasping for air, worsening JARA and BLE swelling. States when she woke up, she felt like she could not take a deep breath and felt chest tightness, nausea, and fatigued. Cannot recall last time BLE were NOT swollen, believes her breathing has worsened along with her BLE swelling. Does not recall experiencing these symptoms before, denies h/o cardiac disease, MI, or CVA. Currently denies CP/SOB, fevers, chills, N/V, dizziness, lightheadedness, or syncope.     In ED, currently wearing 4L O2 N/C (not baseline), -200s, complaining of a headache. Currently denies CP/SOB at rest, c/o JARA w/lower back pain, has external catheter in place draining light yellow urine. S/p IVP Lasix 40mg x1, SL nitro x1, home BP meds resumed. Flu A/B/RSV/COVID negative. CXR revealed pulm congestion, , negative HS trop 21, no ischemic changes on ECG. Symptoms improved w/IVP Lasix. Being admitted under HHVI Service for further management of acute ADHF and HTN Urgency.      Last Recorded Vitals:  Vitals:    01/02/25 1243 01/02/25 1305 01/02/25 1500 01/02/25 1530   BP:  (!) 188/102 175/77 (!) 179/93   BP Location:  Left arm     Pulse:       Resp: (!) 28      Temp:       TempSrc:       SpO2:  96% 94% 96%   Weight:       Height:         Last Labs:  CBC - 1/2/2025: 12:53 PM  5.4 11.1 363    33.1      CMP - 1/2/2025: 12:53 PM  8.8 6.9 14 --- 0.4   4.9 3.6 12 114      PTT - 4/15/2024:  9:28 PM  0.9   10.3 31     Troponin I, High Sensitivity   Date/Time Value Ref Range Status   02/03/2024 01:22 AM 12 0 - 34  ng/L Final   01/24/2024 08:32 PM 25 0 - 34 ng/L Final     Troponin I, High Sensitivity ([facility])   Date/Time Value Ref Range Status   01/02/2025 12:53 PM 21 0 - 34 ng/L Final     Troponin I   Date/Time Value Ref Range Status   01/25/2023 04:58 PM 7 0 - 34 ng/L Final     Comment:     .  Less than 99th percentile of normal range cutoff-  Female and children under 18 years old <35 ng/L; Male <54 ng/L: Negative  Repeat testing should be performed if clinically indicated.   .  Female and children under 18 years old  ng/L; Male  ng/L:  Consistent with possible cardiac damage and possible increased clinical   risk. Serial measurements may help to assess extent of myocardial damage.   .  >120 ng/L: Consistent with cardiac damage, increased clinical risk and  myocardial infarction. Serial measurements may help assess extent of   myocardial damage.   .   NOTE: Children less than 1 year old may have higher baseline troponin   levels and results should be interpreted in conjunction with the overall   clinical context.  .  NOTE: Troponin I testing is performed using a different   testing methodology at [facility] than at other   [facility]. Direct result comparisons should only   be made within the same method.     01/25/2023 04:16 PM 8 0 - 34 ng/L Final     Comment:     .  Less than 99th percentile of normal range cutoff-  Female and children under 18 years old <35 ng/L; Male <54 ng/L: Negative  Repeat testing should be performed if clinically indicated.   .  Female and children under 18 years old  ng/L; Male  ng/L:  Consistent with possible cardiac damage and possible increased clinical   risk. Serial measurements may help to assess extent of myocardial damage.   .  >120 ng/L: Consistent with cardiac damage, increased clinical risk and  myocardial infarction. Serial measurements may help assess extent of   myocardial damage.   .   NOTE: Children less than 1 year old may have higher  "baseline troponin   levels and results should be interpreted in conjunction with the overall   clinical context.  .  NOTE: Troponin I testing is performed using a different   testing methodology at [facility] than at other   [facility] hospitals. Direct result comparisons should only   be made within the same method.       BNP   Date/Time Value Ref Range Status   01/02/2025 12:53  (H) 0 - 99 pg/mL Final   02/03/2024 01:22  (H) 0 - 99 pg/mL Final     POC HEMOGLOBIN A1c   Date/Time Value Ref Range Status   11/19/2024 02:06 PM 9 (A) 4.2 - 6.5 % Final   07/19/2024 08:52 AM 11.2 (A) 4.2 - 6.5 % Final     VLDL   Date/Time Value Ref Range Status   05/08/2023 09:35 AM 38 0 - 40 mg/dL Final   04/12/2021 09:06 AM 21 0 - 40 mg/dL Final   10/20/2020 12:36 PM 24 0 - 40 mg/dL Final      Last I/O:  No intake/output data recorded.    Past Cardiology Tests (Last 3 Years):  EKG:  ECG 12 lead 09/06/2024: (personally reviewed):  - NSR HR 88, no ischemic changes    Echo:  Transthoracic Echo (TTE) Complete 01/25/2024:  1. Left ventricular systolic function is hyperdynamic with a 70-75% estimated ejection fraction.   2. RVSP within normal limits.     Ejection Fractions:  No results found for: \"EF\"  Cath:  No results found for this or any previous visit from the past 1095 days.  Stress Test:  No results found for this or any previous visit from the past 1095 days.  Cardiac Imaging:  No results found for this or any previous visit from the past 1095 days.    Past Medical History:  She has a past medical history of Personal history of other diseases of the circulatory system, Personal history of other endocrine, nutritional and metabolic disease, and Personal history of other endocrine, nutritional and metabolic disease.    Past Surgical History:  She has a past surgical history that includes Other surgical history (10/21/2020); CT angio head w and wo IV contrast (1/25/2023); and CT angio neck (1/25/2023).      Social " History:  She reports that she has never smoked. She has never been exposed to tobacco smoke. She has never used smokeless tobacco. She reports current alcohol use. She reports that she does not use drugs.    Family History:  Family History   Problem Relation Name Age of Onset    Alzheimer's disease Mother      Diabetes Mother      Lung cancer Mother      Diabetes Father      Lung cancer Father      Pancreatic cancer Father      Diabetes Sister      Lung cancer Sister       Allergies:  Patient has no known allergies.    Inpatient Medications:  Scheduled medications   Medication Dose Route Frequency    amLODIPine  10 mg oral Daily    heparin (porcine)  7,500 Units subcutaneous q8h [facility]    [START ON 1/3/2025] insulin glargine  37 Units subcutaneous Daily    insulin lispro  0-10 Units subcutaneous TID AC    polyethylene glycol  17 g oral Daily     PRN medications   Medication    dextrose    dextrose    glucagon    glucagon    oxygen     Continuous Medications   Medication Dose Last Rate     Outpatient Medications:  Current Outpatient Medications   Medication Instructions    acetaminophen (TYLENOL) 650 mg, oral, Every 4 hours PRN    albuterol 90 mcg/actuation inhaler 2 puffs, inhalation, Every 6 hours PRN    amLODIPine (NORVASC) 10 mg, oral, Daily    aspirin 81 mg, oral, Daily RT    atorvastatin (LIPITOR) 40 mg, oral, Daily    b complex vitamins capsule 1 capsule, Daily    Basaglar KwikPen U-100 Insulin 32 Units, subcutaneous, Every morning, Take as directed per insulin instructions.    blood-glucose sensor (FreeStyle [place] 3 Sensor) device Use to monitor blood glucose. Change sensor every 14 days.    cloNIDine (CATAPRES) 0.1 mg, oral, 2 times daily    folic acid (Folvite) 1 mg tablet 1 tablet, Daily    FreeStyle Remington 3 [place] misc Use as instructed to monitor blood glucose.    gabapentin (NEURONTIN) 300 mg, oral, 2 times daily    insulin lispro (Admelog SoloStar U-100 Insulin) 100 unit/mL injection Inject per sliding  "scale instructions under the skin 3 times a day with meals. (Max daily dose 70 units)    meclizine (ANTIVERT) 25 mg, oral, 3 times daily PRN    Mounjaro 2.5 mg, subcutaneous, Every 7 days    OneTouch Delica Plus Lancet 30 gauge misc USE TO TEST BLOOD SUGAR AS DIRECTED    OneTouch Ultra Test strip Use 1 strip BID to check glucose    OneTouch Ultra2 Meter misc use 1 to 2 times daily    pen needle, diabetic 32 gauge x 5/32\" needle Use four times a day with insulin use     Review of Systems   Constitutional: Negative for chills, diaphoresis, fever and malaise/fatigue.   HENT:  Negative for ear pain, hoarse voice, nosebleeds and sore throat.    Eyes:  Negative for double vision and visual disturbance.   Cardiovascular:  Positive for dyspnea on exertion, leg swelling, orthopnea and paroxysmal nocturnal dyspnea. Negative for chest pain and syncope.   Respiratory:  Positive for cough and sleep disturbances due to breathing. Negative for hemoptysis, shortness of breath, sputum production and wheezing.    Endocrine: Negative for polydipsia, polyphagia and polyuria.   Hematologic/Lymphatic: Does not bruise/bleed easily.   Skin:  Negative for suspicious lesions.   Musculoskeletal:  Positive for back pain. Negative for falls and myalgias.   Gastrointestinal:  Negative for bloating, abdominal pain, diarrhea, hematemesis, hematochezia, nausea and vomiting.   Genitourinary:  Negative for dysuria, flank pain and hematuria.   Neurological:  Positive for headaches. Negative for light-headedness.   Psychiatric/Behavioral:  Negative for altered mental status.    Allergic/Immunologic: Negative for environmental allergies.      Physical Exam:  General: NAD, lying in bed  Skin: warm and dry throughout  Head/ neck: +JVD mid-neck  Cardiac: RRR, S1, S2, NSR on tele HR 80s   Pulm: +rales in BLL, on 4L O2 N/C (not baseline)  GI: soft, nontender, non-distended   Extremities: +2pitting edema in BLE up to knees  Neuro: no focal neuro deficits, " a+ox4   Psych: appropriate mood and behavior, very pleasant       Assessment/Plan   Nuris Squires is a 59 y.o. female presenting with PMH of poorly controlled insulin dependent type 2 diabetes c/b neuropathy, hyperlipidemia, PAD, CKD stage IV, hypertension, multiple foot infections s/p partial R great toe amputation, fall w/L femur fracture s/p ORIF (4/2024) that presents to the emergency department today for shortness of breath x1 day. Admitted under HHVI Service for further management of acute ADHF and HTN Urgency     Acute diastolic heart failure  ADHF  Chest tightness, resolved  - new diagnosis, likely 2/2 uncontrolled HTN  - TTE (1/2024): EF 70-75%  - repeat TTE pending  - neg stress 10/2019  - CXR: Constellation of findings is suggestive of congestive heart failure  -  ISO obesity   - admit weight: 99.8kg  - of note, pharmacy verified patient on PO lasix 20mg daily at home (?for unclear reasons, presumably prescribed by PCP for darline swelling)  - s/p IVP Lasix 40mg x1 in ED, give additional dose  - reported experiencing chest tightness prior to arrival, currently denies  - HS trop 21, no ischemic changes on ECG, no c/f ACS  - titrate GDMT as kidneys allow  - Daily standing weights, strict I&O's, 2g sodium diet, 2L fluid restriction     Acute hypoxic respiratory failure iso of ADHF exacerbation  - p.ox initially at 61% but improved rapidly with placement of nasal cannula  - She was requiring 6 L of oxygen, BiPAP briefly due to the concern for SCAPE, received SL nitro and she was able to wean off of BiPAP  - Currently back on nasal cannula at 4 L (RA at baseline), satting >95%  - Flu A/B/RSV/COVID negative  - diuresis plan as above    HTN urgency, improving  - BP on on admit: 220/98  - SBP improved to 143 after resumption of home meds and s/p IVP Hydral 5mg x1  - has headache & lower back pain, tylenol PRN  - cont. Home meds: clonidine 0.1mg BID, amlodipine 10mg daily  - PRN IVP Hydral 5mg q6hrs  SBP>180    Insulin dependent Type 2 diabetes  Poorly controlled  - Follow with clinical pharmacy closely/PCP  - HGB1c 9% (11/19/24)  - home regimen: Mounjaro 2.5mg weekly,basaglar 32 units every morning, admelog SS with meals  - c/w lantus 32 units every morning and SSI #2 while inpatient  - likely transition to glucommander tomorrow once transferred to floor  - OARRS reviewed, cont. Home gabapentin 300mg BID     HLD  PAD  - c/w asa 81mg daily, atorva 40mg daily     CKD IV  - Cr last year 1.7-5.3, worsening since 2022  - admit Cr 2.34  - likely cardiorenal 2/2 ADHF, diuresis plan as above  - trend daily      DVT ppx: subQ heparin  Emergency contacts: Boyfrienterrie Rousseau #[phone]  Brother Luis Squires #[phone]  DISPO: pending further diuresis/HF med optimization    All labs, vital signs, tests & imaging results, and medications were reviewed.    To be seen and discussed with AM staff tomorrow    Code Status:  Full Code: CONFIRMED upon admission    Betty Martin, APRN-CNP

## 2025-01-02 NOTE — HOSPITAL COURSE
Nuris Squires is a 59 y.o. female presenting with PMH of poorly controlled insulin dependent type 2 diabetes c/b neuropathy, hyperlipidemia, PAD, CKD stage IV, hypertension, multiple foot infections s/p partial R great toe amputation, fall w/L femur fracture s/p ORIF (4/2024) that presents to the emergency department for shortness of breath. She was placed on 4L O2 N/C (not baseline), SBP were 160-200s and she was also complaining of a headache.  Patient reported no CP but did report JARA. Her Flu A/B/RSV/COVID was negative. CXR revealed pulm congestion. Additionally, her BNP was 376. There were no no ischemic changes on ECG. Symptoms improved w/IVP Lasix. Being admitted under HHVI Service for further management of acute ADHF and HTN Urgency.     Floor Course:  HTN controlled w/med adjustments. Initially had headaches with isordil, tolerating imdur 30mg daily. TTE (performed prior to successful diuresis) showed EF 69%, grade II diastolic filling, moderate MR, moderate TR, small pericardial effusion. Acute HFpEF. S/p RHC on (1/8): reviewed tracings with Dr. Mckee, numbers are questionable but RVEDP 20, PA 75/25, unclear wedge. Diuresed with IV Lasix gtt (poor response to boluses) and prn PO metolazone. With new oxygen requirement there was concern for pulmonary embolism given high PA pressures on RHC; VQ scan completed: low probability of PE. GDMT titrated as patient tolerated (limited d/t poor kidney function). Her clonidine was weaned off and she was transitioned to Hydralazine and imdur.  Patient was seen by nephrology during her stay regarding VENKAT on CKD; held off on amlodipine to allow permissive HTB w/SBP>120, suspect worsening CKD iso uncontrolled T2DM. Cr 4.11 on discharge.     Underwent RHC to better delineate volume status1/17 which showed improved filling pressures w/hyperdynamic CO/CI.  Diuresis was held, discharged w/PRN PO torsemide (PRN wt gain/SOB/BLE swelling). GDMT optimized based on BP and HR.  Acute urinary retention w/failed TOV attempted 1/22. Requiring new Louis replacement on 1/23 AM for retention of 700cc. Urology consulted, likely 2/2 neurogenic bladder d/t uncontrolled T2DM, recommend discharging w/Louis until outpatient retention evaluation w/urology on 2/6. On oxybutynin TID. Did NOT initiate tamsulosin d/t prior issues w/lightheadedness w/isordil.    HF educator and diabetes educator both provided education to patient regarding diagnoses. Recommend OP follow-up w/PCP for further investigation of anemia of unclear etiology; prior colonoscopy (2019) unremarkable and no bleeding issues in-house, hgb remained stable.    PT/OT rec low-intensity care. HF navigator consulted, HF care with close OP follow-up arranged. Patient to resume home HHC w/RN/PT/OT (referral sent; RN for follow-up teaching and catheter care). [id number] services used.    Discharge weight: 104kg    After all labs and VS were reviewed the decision was made that the patient was medically stable for discharge.  The patient was discharged in satisfactory condition.    More than 60 minutes were spent in coordinating patient discharge.

## 2025-01-03 ENCOUNTER — APPOINTMENT (OUTPATIENT)
Dept: CARDIOLOGY | Facility: HOSPITAL | Age: 60
End: 2025-01-03
Payer: COMMERCIAL

## 2025-01-03 LAB
ALBUMIN SERPL BCP-MCNC: 3.1 G/DL (ref 3.4–5)
ANION GAP SERPL CALC-SCNC: 11 MMOL/L (ref 10–20)
AORTIC VALVE PEAK VELOCITY: 1.68 M/S
ATRIAL RATE: 88 BPM
AV PEAK GRADIENT: 11 MMHG
AVA (PEAK VEL): 2 CM2
BUN SERPL-MCNC: 35 MG/DL (ref 6–23)
CALCIUM SERPL-MCNC: 8.6 MG/DL (ref 8.6–10.6)
CHLORIDE SERPL-SCNC: 109 MMOL/L (ref 98–107)
CO2 SERPL-SCNC: 26 MMOL/L (ref 21–32)
CREAT SERPL-MCNC: 2.49 MG/DL (ref 0.5–1.05)
EGFRCR SERPLBLD CKD-EPI 2021: 22 ML/MIN/1.73M*2
EJECTION FRACTION APICAL 4 CHAMBER: 75.2
EJECTION FRACTION: 69 %
ERYTHROCYTE [DISTWIDTH] IN BLOOD BY AUTOMATED COUNT: 14.1 % (ref 11.5–14.5)
EST. AVERAGE GLUCOSE BLD GHB EST-MCNC: 203 MG/DL
GLUCOSE BLD MANUAL STRIP-MCNC: 124 MG/DL (ref 74–99)
GLUCOSE BLD MANUAL STRIP-MCNC: 137 MG/DL (ref 74–99)
GLUCOSE BLD MANUAL STRIP-MCNC: 141 MG/DL (ref 74–99)
GLUCOSE BLD MANUAL STRIP-MCNC: 205 MG/DL (ref 74–99)
GLUCOSE SERPL-MCNC: 124 MG/DL (ref 74–99)
HBA1C MFR BLD: 8.7 %
HCT VFR BLD AUTO: 30 % (ref 36–46)
HGB BLD-MCNC: 9.4 G/DL (ref 12–16)
LEFT ATRIUM VOLUME AREA LENGTH INDEX BSA: 41.6 ML/M2
LEFT VENTRICLE INTERNAL DIMENSION DIASTOLE: 4.41 CM (ref 3.5–6)
LEFT VENTRICULAR OUTFLOW TRACT DIAMETER: 1.79 CM
MAGNESIUM SERPL-MCNC: 2.06 MG/DL (ref 1.6–2.4)
MCH RBC QN AUTO: 29.8 PG (ref 26–34)
MCHC RBC AUTO-ENTMCNC: 31.3 G/DL (ref 32–36)
MCV RBC AUTO: 95 FL (ref 80–100)
MITRAL VALVE E/A RATIO: 1.19
NRBC BLD-RTO: 0 /100 WBCS (ref 0–0)
P AXIS: 58 DEGREES
P OFFSET: 214 MS
P ONSET: 157 MS
PHOSPHATE SERPL-MCNC: 4.9 MG/DL (ref 2.5–4.9)
PLATELET # BLD AUTO: 285 X10*3/UL (ref 150–450)
POTASSIUM SERPL-SCNC: 4.2 MMOL/L (ref 3.5–5.3)
PR INTERVAL: 128 MS
Q ONSET: 221 MS
QRS COUNT: 14 BEATS
QRS DURATION: 82 MS
QT INTERVAL: 366 MS
QTC CALCULATION(BAZETT): 442 MS
QTC FREDERICIA: 416 MS
R AXIS: 134 DEGREES
RBC # BLD AUTO: 3.15 X10*6/UL (ref 4–5.2)
RIGHT VENTRICLE FREE WALL PEAK S': 10 CM/S
RIGHT VENTRICLE PEAK SYSTOLIC PRESSURE: 45.2 MMHG
SODIUM SERPL-SCNC: 142 MMOL/L (ref 136–145)
T AXIS: -1 DEGREES
T OFFSET: 404 MS
TRICUSPID ANNULAR PLANE SYSTOLIC EXCURSION: 2.5 CM
VENTRICULAR RATE: 88 BPM
WBC # BLD AUTO: 5.3 X10*3/UL (ref 4.4–11.3)

## 2025-01-03 PROCEDURE — 2500000001 HC RX 250 WO HCPCS SELF ADMINISTERED DRUGS (ALT 637 FOR MEDICARE OP)

## 2025-01-03 PROCEDURE — 2500000001 HC RX 250 WO HCPCS SELF ADMINISTERED DRUGS (ALT 637 FOR MEDICARE OP): Performed by: NURSE PRACTITIONER

## 2025-01-03 PROCEDURE — 2500000001 HC RX 250 WO HCPCS SELF ADMINISTERED DRUGS (ALT 637 FOR MEDICARE OP): Performed by: STUDENT IN AN ORGANIZED HEALTH CARE EDUCATION/TRAINING PROGRAM

## 2025-01-03 PROCEDURE — 99232 SBSQ HOSP IP/OBS MODERATE 35: CPT | Performed by: STUDENT IN AN ORGANIZED HEALTH CARE EDUCATION/TRAINING PROGRAM

## 2025-01-03 PROCEDURE — 2500000004 HC RX 250 GENERAL PHARMACY W/ HCPCS (ALT 636 FOR OP/ED): Performed by: NURSE PRACTITIONER

## 2025-01-03 PROCEDURE — C8929 TTE W OR WO FOL WCON,DOPPLER: HCPCS

## 2025-01-03 PROCEDURE — 85027 COMPLETE CBC AUTOMATED: CPT | Performed by: NURSE PRACTITIONER

## 2025-01-03 PROCEDURE — 2500000002 HC RX 250 W HCPCS SELF ADMINISTERED DRUGS (ALT 637 FOR MEDICARE OP, ALT 636 FOR OP/ED): Performed by: NURSE PRACTITIONER

## 2025-01-03 PROCEDURE — 1200000002 HC GENERAL ROOM WITH TELEMETRY DAILY

## 2025-01-03 PROCEDURE — 93306 TTE W/DOPPLER COMPLETE: CPT | Performed by: INTERNAL MEDICINE

## 2025-01-03 PROCEDURE — 36415 COLL VENOUS BLD VENIPUNCTURE: CPT | Performed by: NURSE PRACTITIONER

## 2025-01-03 PROCEDURE — 83735 ASSAY OF MAGNESIUM: CPT | Performed by: NURSE PRACTITIONER

## 2025-01-03 PROCEDURE — 82947 ASSAY GLUCOSE BLOOD QUANT: CPT

## 2025-01-03 PROCEDURE — 80069 RENAL FUNCTION PANEL: CPT | Performed by: STUDENT IN AN ORGANIZED HEALTH CARE EDUCATION/TRAINING PROGRAM

## 2025-01-03 PROCEDURE — 2500000004 HC RX 250 GENERAL PHARMACY W/ HCPCS (ALT 636 FOR OP/ED): Performed by: STUDENT IN AN ORGANIZED HEALTH CARE EDUCATION/TRAINING PROGRAM

## 2025-01-03 RX ORDER — CLONIDINE HYDROCHLORIDE 0.1 MG/1
0.1 TABLET ORAL DAILY
Status: DISCONTINUED | OUTPATIENT
Start: 2025-01-04 | End: 2025-01-05

## 2025-01-03 RX ORDER — ATORVASTATIN CALCIUM 40 MG/1
40 TABLET, FILM COATED ORAL NIGHTLY
Status: DISCONTINUED | OUTPATIENT
Start: 2025-01-03 | End: 2025-01-23 | Stop reason: HOSPADM

## 2025-01-03 RX ORDER — FUROSEMIDE 10 MG/ML
80 INJECTION INTRAMUSCULAR; INTRAVENOUS ONCE
Status: COMPLETED | OUTPATIENT
Start: 2025-01-03 | End: 2025-01-03

## 2025-01-03 RX ORDER — NAPROXEN SODIUM 220 MG/1
81 TABLET, FILM COATED ORAL DAILY
Status: DISCONTINUED | OUTPATIENT
Start: 2025-01-03 | End: 2025-01-23 | Stop reason: HOSPADM

## 2025-01-03 RX ORDER — ISOSORBIDE DINITRATE 10 MG/1
20 TABLET ORAL
Status: DISCONTINUED | OUTPATIENT
Start: 2025-01-03 | End: 2025-01-05

## 2025-01-03 RX ORDER — HYDRALAZINE HYDROCHLORIDE 10 MG/1
25 TABLET, FILM COATED ORAL 3 TIMES DAILY
Status: DISCONTINUED | OUTPATIENT
Start: 2025-01-03 | End: 2025-01-04

## 2025-01-03 RX ADMIN — HYDRALAZINE HYDROCHLORIDE 25 MG: 10 TABLET, FILM COATED ORAL at 21:31

## 2025-01-03 RX ADMIN — HYDRALAZINE HYDROCHLORIDE 25 MG: 10 TABLET, FILM COATED ORAL at 16:25

## 2025-01-03 RX ADMIN — PERFLUTREN 2 ML OF DILUTION: 6.52 INJECTION, SUSPENSION INTRAVENOUS at 09:55

## 2025-01-03 RX ADMIN — FUROSEMIDE 80 MG: 10 INJECTION, SOLUTION INTRAVENOUS at 10:41

## 2025-01-03 RX ADMIN — CLONIDINE HYDROCHLORIDE 0.1 MG: 0.1 TABLET ORAL at 08:06

## 2025-01-03 RX ADMIN — ATORVASTATIN CALCIUM 40 MG: 40 TABLET, FILM COATED ORAL at 21:32

## 2025-01-03 RX ADMIN — AMLODIPINE BESYLATE 10 MG: 10 TABLET ORAL at 08:06

## 2025-01-03 RX ADMIN — NEPHROCAP 1 CAPSULE: 1 CAP ORAL at 08:06

## 2025-01-03 RX ADMIN — ISOSORBIDE DINITRATE 20 MG: 10 TABLET ORAL at 16:25

## 2025-01-03 RX ADMIN — ASPIRIN 81 MG: 81 TABLET, CHEWABLE ORAL at 08:06

## 2025-01-03 RX ADMIN — FOLIC ACID 1 MG: 1 TABLET ORAL at 08:06

## 2025-01-03 RX ADMIN — HEPARIN SODIUM 7500 UNITS: 5000 INJECTION INTRAVENOUS; SUBCUTANEOUS at 21:32

## 2025-01-03 RX ADMIN — GABAPENTIN 300 MG: 300 CAPSULE ORAL at 08:06

## 2025-01-03 RX ADMIN — ISOSORBIDE DINITRATE 20 MG: 10 TABLET ORAL at 21:32

## 2025-01-03 RX ADMIN — GABAPENTIN 300 MG: 300 CAPSULE ORAL at 21:32

## 2025-01-03 RX ADMIN — INSULIN LISPRO 4 UNITS: 100 INJECTION, SOLUTION INTRAVENOUS; SUBCUTANEOUS at 16:26

## 2025-01-03 RX ADMIN — INSULIN GLARGINE 32 UNITS: 100 INJECTION, SOLUTION SUBCUTANEOUS at 08:49

## 2025-01-03 RX ADMIN — FUROSEMIDE 80 MG: 10 INJECTION, SOLUTION INTRAVENOUS at 16:25

## 2025-01-03 RX ADMIN — HEPARIN SODIUM 7500 UNITS: 5000 INJECTION INTRAVENOUS; SUBCUTANEOUS at 13:55

## 2025-01-03 RX ADMIN — HEPARIN SODIUM 7500 UNITS: 5000 INJECTION INTRAVENOUS; SUBCUTANEOUS at 07:01

## 2025-01-03 ASSESSMENT — COGNITIVE AND FUNCTIONAL STATUS - GENERAL
DAILY ACTIVITIY SCORE: 20
PERSONAL GROOMING: A LITTLE
MOVING TO AND FROM BED TO CHAIR: A LITTLE
DRESSING REGULAR LOWER BODY CLOTHING: A LITTLE
CLIMB 3 TO 5 STEPS WITH RAILING: A LITTLE
TOILETING: A LITTLE
HELP NEEDED FOR BATHING: A LITTLE
STANDING UP FROM CHAIR USING ARMS: A LITTLE
MOBILITY SCORE: 20
WALKING IN HOSPITAL ROOM: A LITTLE

## 2025-01-03 ASSESSMENT — PAIN SCALES - GENERAL
PAINLEVEL_OUTOF10: 0 - NO PAIN
PAINLEVEL_OUTOF10: 0 - NO PAIN

## 2025-01-03 ASSESSMENT — PAIN - FUNCTIONAL ASSESSMENT
PAIN_FUNCTIONAL_ASSESSMENT: 0-10
PAIN_FUNCTIONAL_ASSESSMENT: 0-10

## 2025-01-03 NOTE — PROGRESS NOTES
01/03/25 1406   Discharge Planning   Living Arrangements Spouse/significant other   Support Systems Spouse/significant other   Assistance Needed None   Type of Residence Private residence   Number of Stairs to Enter Residence 5   Number of Stairs Within Residence 0   Do you have animals or pets at home? Yes   Type of Animals or Pets dog   Who is requesting discharge planning? Provider   Home or Post Acute Services None   Expected Discharge Disposition Home   Does the patient need discharge transport arranged? No     Met with patient and introduced myself as Care Coordinator and member of the discharge planning team.  Patient was admitted for further evaluation of heart failure. She was independent in ADL's prior to hospitalization. She uses a walker. No recent falls. Reports femur fx one year ago. Her PCP is Dr. Aquino. She uses [facility] . No home care needs were identified at this time. Care coordinator will continue to follow for home going needs.

## 2025-01-03 NOTE — CARE PLAN
The patient's goals for the shift include rest    The clinical goals for the shift include patient will remain HDS throughout shift      Problem: Pain - Adult  Goal: Verbalizes/displays adequate comfort level or baseline comfort level  Outcome: Progressing     Problem: Safety - Adult  Goal: Free from fall injury  Outcome: Progressing     Problem: Discharge Planning  Goal: Discharge to home or other facility with appropriate resources  Outcome: Progressing     Problem: Chronic Conditions and Co-morbidities  Goal: Patient's chronic conditions and co-morbidity symptoms are monitored and maintained or improved  Outcome: Progressing     Problem: Skin  Goal: Decreased wound size/increased tissue granulation at next dressing change  Outcome: Progressing  Flowsheets (Taken 1/2/2025 2124)  Decreased wound size/increased tissue granulation at next dressing change:   Promote sleep for wound healing   Protective dressings over bony prominences  Goal: Participates in plan/prevention/treatment measures  Outcome: Progressing  Flowsheets (Taken 1/2/2025 2124)  Participates in plan/prevention/treatment measures: Elevate heels  Goal: Prevent/manage excess moisture  Outcome: Progressing  Flowsheets (Taken 1/2/2025 2124)  Prevent/manage excess moisture: Cleanse incontinence/protect with barrier cream  Goal: Prevent/minimize sheer/friction injuries  Outcome: Progressing  Flowsheets (Taken 1/2/2025 2124)  Prevent/minimize sheer/friction injuries: Use pull sheet  Goal: Promote/optimize nutrition  Outcome: Progressing  Flowsheets (Taken 1/2/2025 2124)  Promote/optimize nutrition:   Consume > 50% meals/supplements   Offer water/supplements/favorite foods   Monitor/record intake including meals  Goal: Promote skin healing  Outcome: Progressing  Flowsheets (Taken 1/2/2025 2124)  Promote skin healing: Protective dressings over bony prominences     Problem: Fall/Injury  Goal: Not fall by end of shift  Outcome: Progressing  Goal: Be free from  injury by end of the shift  Outcome: Progressing  Goal: Verbalize understanding of personal risk factors for fall in the hospital  Outcome: Progressing  Goal: Verbalize understanding of risk factor reduction measures to prevent injury from fall in the home  Outcome: Progressing  Goal: Use assistive devices by end of the shift  Outcome: Progressing  Goal: Pace activities to prevent fatigue by end of the shift  Outcome: Progressing     Problem: Diabetes  Goal: Achieve decreasing blood glucose levels by end of shift  Outcome: Progressing  Goal: Increase stability of blood glucose readings by end of shift  Outcome: Progressing  Goal: Decrease in ketones present in urine by end of shift  Outcome: Progressing  Goal: Maintain electrolyte levels within acceptable range throughout shift  Outcome: Progressing  Goal: Maintain glucose levels >70mg/dl to <250mg/dl throughout shift  Outcome: Progressing  Goal: No changes in neurological exam by end of shift  Outcome: Progressing  Goal: Learn about and adhere to nutrition recommendations by end of shift  Outcome: Progressing  Goal: Vital signs within normal range for age by end of shift  Outcome: Progressing  Goal: Increase self care and/or family involovement by end of shift  Outcome: Progressing  Goal: Receive DSME education by end of shift  Outcome: Progressing     Problem: Heart Failure  Goal: Improved gas exchange this shift  Outcome: Progressing  Goal: Improved urinary output this shift  Outcome: Progressing  Goal: Reduction in peripheral edema within 24 hours  Outcome: Progressing  Goal: Report improvement of dyspnea/breathlessness this shift  Outcome: Progressing  Goal: Weight from fluid excess reduced over 2-3 days, then stabilize  Outcome: Progressing  Goal: Increase self care and/or family involvement in 24 hours  Outcome: Progressing

## 2025-01-03 NOTE — CARE PLAN
Problem: Skin  Goal: Decreased wound size/increased tissue granulation at next dressing change  1/3/2025 1814 by Odette Carlton RN  Outcome: Progressing  Flowsheets (Taken 1/3/2025 1814)  Decreased wound size/increased tissue granulation at next dressing change:   Promote sleep for wound healing   Protective dressings over bony prominences   Utilize specialty bed per algorithm  1/3/2025 1814 by Odette Carlton RN  Outcome: Progressing  Flowsheets (Taken 1/3/2025 1814)  Decreased wound size/increased tissue granulation at next dressing change:   Promote sleep for wound healing   Protective dressings over bony prominences   Utilize specialty bed per algorithm  Goal: Participates in plan/prevention/treatment measures  1/3/2025 1814 by Odette Carlton RN  Outcome: Progressing  Flowsheets (Taken 1/3/2025 1814)  Participates in plan/prevention/treatment measures:   Elevate heels   Discuss with provider PT/OT consult   Increase activity/out of bed for meals  1/3/2025 1814 by Odette Carlton RN  Outcome: Progressing  Flowsheets (Taken 1/3/2025 1814)  Participates in plan/prevention/treatment measures:   Elevate heels   Discuss with provider PT/OT consult   Increase activity/out of bed for meals  Goal: Prevent/manage excess moisture  1/3/2025 1814 by Odette Carlton RN  Outcome: Progressing  Flowsheets (Taken 1/3/2025 1814)  Prevent/manage excess moisture:   Cleanse incontinence/protect with barrier cream   Monitor for/manage infection if present   Follow provider orders for dressing changes   Moisturize dry skin  1/3/2025 1814 by Odette Carlton RN  Outcome: Progressing  Flowsheets (Taken 1/3/2025 1814)  Prevent/manage excess moisture:   Cleanse incontinence/protect with barrier cream   Monitor for/manage infection if present   Follow provider orders for dressing changes   Moisturize dry skin  Goal: Prevent/minimize sheer/friction injuries  1/3/2025 1814 by Odette Carlton RN  Outcome: Progressing  Flowsheets (Taken 1/3/2025  1814)  Prevent/minimize sheer/friction injuries:   Complete micro-shifts as needed if patient unable. Adjust patient position to relieve pressure points, not a full turn   Increase activity/out of bed for meals   Use pull sheet   HOB 30 degrees or less   Utilize specialty bed per algorithm  1/3/2025 1814 by Odette Carlton RN  Outcome: Progressing  Flowsheets (Taken 1/3/2025 1814)  Prevent/minimize sheer/friction injuries:   Complete micro-shifts as needed if patient unable. Adjust patient position to relieve pressure points, not a full turn   Increase activity/out of bed for meals   Use pull sheet   HOB 30 degrees or less   Utilize specialty bed per algorithm  Goal: Promote/optimize nutrition  1/3/2025 1814 by Odette Carlton RN  Outcome: Progressing  Flowsheets (Taken 1/3/2025 1814)  Promote/optimize nutrition:   Assist with feeding   Monitor/record intake including meals   Consume > 50% meals/supplements   Offer water/supplements/favorite foods  1/3/2025 1814 by Odette Carlton RN  Outcome: Progressing  Flowsheets (Taken 1/3/2025 1814)  Promote/optimize nutrition:   Assist with feeding   Monitor/record intake including meals   Consume > 50% meals/supplements   Offer water/supplements/favorite foods  Goal: Promote skin healing  1/3/2025 1814 by Odette Carlton RN  Flowsheets (Taken 1/3/2025 1814)  Promote skin healing:   Assess skin/pad under line(s)/device(s)   Protective dressings over bony prominences   Ensure correct size (line/device) and apply per  instructions  1/3/2025 1814 by Odette Carlton RN  Outcome: Progressing  Flowsheets (Taken 1/3/2025 1814)  Promote skin healing:   Assess skin/pad under line(s)/device(s)   Protective dressings over bony prominences   Ensure correct size (line/device) and apply per  instructions

## 2025-01-04 LAB
ALBUMIN SERPL BCP-MCNC: 2.9 G/DL (ref 3.4–5)
ANION GAP SERPL CALC-SCNC: 11 MMOL/L (ref 10–20)
ANION GAP SERPL CALC-SCNC: 14 MMOL/L (ref 10–20)
BUN SERPL-MCNC: 36 MG/DL (ref 6–23)
BUN SERPL-MCNC: 41 MG/DL (ref 6–23)
CALCIUM SERPL-MCNC: 8.4 MG/DL (ref 8.6–10.6)
CALCIUM SERPL-MCNC: 8.8 MG/DL (ref 8.6–10.6)
CHLORIDE SERPL-SCNC: 104 MMOL/L (ref 98–107)
CHLORIDE SERPL-SCNC: 108 MMOL/L (ref 98–107)
CO2 SERPL-SCNC: 27 MMOL/L (ref 21–32)
CO2 SERPL-SCNC: 27 MMOL/L (ref 21–32)
CREAT SERPL-MCNC: 2.55 MG/DL (ref 0.5–1.05)
CREAT SERPL-MCNC: 2.74 MG/DL (ref 0.5–1.05)
EGFRCR SERPLBLD CKD-EPI 2021: 19 ML/MIN/1.73M*2
EGFRCR SERPLBLD CKD-EPI 2021: 21 ML/MIN/1.73M*2
ERYTHROCYTE [DISTWIDTH] IN BLOOD BY AUTOMATED COUNT: 13.9 % (ref 11.5–14.5)
GLUCOSE BLD MANUAL STRIP-MCNC: 125 MG/DL (ref 74–99)
GLUCOSE BLD MANUAL STRIP-MCNC: 142 MG/DL (ref 74–99)
GLUCOSE BLD MANUAL STRIP-MCNC: 187 MG/DL (ref 74–99)
GLUCOSE BLD MANUAL STRIP-MCNC: 72 MG/DL (ref 74–99)
GLUCOSE BLD MANUAL STRIP-MCNC: 86 MG/DL (ref 74–99)
GLUCOSE SERPL-MCNC: 200 MG/DL (ref 74–99)
GLUCOSE SERPL-MCNC: 79 MG/DL (ref 74–99)
HCT VFR BLD AUTO: 31.6 % (ref 36–46)
HGB BLD-MCNC: 9.8 G/DL (ref 12–16)
MAGNESIUM SERPL-MCNC: 2.04 MG/DL (ref 1.6–2.4)
MCH RBC QN AUTO: 28.9 PG (ref 26–34)
MCHC RBC AUTO-ENTMCNC: 31 G/DL (ref 32–36)
MCV RBC AUTO: 93 FL (ref 80–100)
NRBC BLD-RTO: 0 /100 WBCS (ref 0–0)
PHOSPHATE SERPL-MCNC: 4.9 MG/DL (ref 2.5–4.9)
PLATELET # BLD AUTO: 329 X10*3/UL (ref 150–450)
POTASSIUM SERPL-SCNC: 4.1 MMOL/L (ref 3.5–5.3)
POTASSIUM SERPL-SCNC: 4.2 MMOL/L (ref 3.5–5.3)
RBC # BLD AUTO: 3.39 X10*6/UL (ref 4–5.2)
SODIUM SERPL-SCNC: 141 MMOL/L (ref 136–145)
SODIUM SERPL-SCNC: 142 MMOL/L (ref 136–145)
WBC # BLD AUTO: 6.9 X10*3/UL (ref 4.4–11.3)

## 2025-01-04 PROCEDURE — 80069 RENAL FUNCTION PANEL: CPT | Performed by: STUDENT IN AN ORGANIZED HEALTH CARE EDUCATION/TRAINING PROGRAM

## 2025-01-04 PROCEDURE — 82947 ASSAY GLUCOSE BLOOD QUANT: CPT

## 2025-01-04 PROCEDURE — 2500000001 HC RX 250 WO HCPCS SELF ADMINISTERED DRUGS (ALT 637 FOR MEDICARE OP): Performed by: NURSE PRACTITIONER

## 2025-01-04 PROCEDURE — 85027 COMPLETE CBC AUTOMATED: CPT | Performed by: NURSE PRACTITIONER

## 2025-01-04 PROCEDURE — 2500000004 HC RX 250 GENERAL PHARMACY W/ HCPCS (ALT 636 FOR OP/ED)

## 2025-01-04 PROCEDURE — 1200000002 HC GENERAL ROOM WITH TELEMETRY DAILY

## 2025-01-04 PROCEDURE — 80048 BASIC METABOLIC PNL TOTAL CA: CPT | Mod: CCI

## 2025-01-04 PROCEDURE — 83880 ASSAY OF NATRIURETIC PEPTIDE: CPT

## 2025-01-04 PROCEDURE — 36415 COLL VENOUS BLD VENIPUNCTURE: CPT | Performed by: NURSE PRACTITIONER

## 2025-01-04 PROCEDURE — 36415 COLL VENOUS BLD VENIPUNCTURE: CPT | Performed by: STUDENT IN AN ORGANIZED HEALTH CARE EDUCATION/TRAINING PROGRAM

## 2025-01-04 PROCEDURE — 99232 SBSQ HOSP IP/OBS MODERATE 35: CPT | Performed by: STUDENT IN AN ORGANIZED HEALTH CARE EDUCATION/TRAINING PROGRAM

## 2025-01-04 PROCEDURE — 2500000001 HC RX 250 WO HCPCS SELF ADMINISTERED DRUGS (ALT 637 FOR MEDICARE OP): Performed by: STUDENT IN AN ORGANIZED HEALTH CARE EDUCATION/TRAINING PROGRAM

## 2025-01-04 PROCEDURE — 2500000002 HC RX 250 W HCPCS SELF ADMINISTERED DRUGS (ALT 637 FOR MEDICARE OP, ALT 636 FOR OP/ED): Performed by: NURSE PRACTITIONER

## 2025-01-04 PROCEDURE — 2500000001 HC RX 250 WO HCPCS SELF ADMINISTERED DRUGS (ALT 637 FOR MEDICARE OP)

## 2025-01-04 PROCEDURE — 83735 ASSAY OF MAGNESIUM: CPT | Performed by: NURSE PRACTITIONER

## 2025-01-04 PROCEDURE — 2500000004 HC RX 250 GENERAL PHARMACY W/ HCPCS (ALT 636 FOR OP/ED): Performed by: NURSE PRACTITIONER

## 2025-01-04 RX ORDER — FUROSEMIDE 10 MG/ML
80 INJECTION INTRAMUSCULAR; INTRAVENOUS ONCE
Status: COMPLETED | OUTPATIENT
Start: 2025-01-04 | End: 2025-01-04

## 2025-01-04 RX ORDER — HYDRALAZINE HYDROCHLORIDE 50 MG/1
50 TABLET, FILM COATED ORAL 3 TIMES DAILY
Status: DISCONTINUED | OUTPATIENT
Start: 2025-01-04 | End: 2025-01-20

## 2025-01-04 RX ORDER — AMLODIPINE BESYLATE 10 MG/1
10 TABLET ORAL DAILY
Status: DISCONTINUED | OUTPATIENT
Start: 2025-01-04 | End: 2025-01-18

## 2025-01-04 RX ADMIN — GABAPENTIN 300 MG: 300 CAPSULE ORAL at 08:08

## 2025-01-04 RX ADMIN — HYDRALAZINE HYDROCHLORIDE 50 MG: 50 TABLET ORAL at 14:00

## 2025-01-04 RX ADMIN — HEPARIN SODIUM 7500 UNITS: 5000 INJECTION INTRAVENOUS; SUBCUTANEOUS at 06:11

## 2025-01-04 RX ADMIN — ASPIRIN 81 MG: 81 TABLET, CHEWABLE ORAL at 08:08

## 2025-01-04 RX ADMIN — AMLODIPINE BESYLATE 10 MG: 10 TABLET ORAL at 10:06

## 2025-01-04 RX ADMIN — ACETAMINOPHEN 650 MG: 325 TABLET ORAL at 21:34

## 2025-01-04 RX ADMIN — HYDRALAZINE HYDROCHLORIDE 50 MG: 50 TABLET ORAL at 20:21

## 2025-01-04 RX ADMIN — FUROSEMIDE 80 MG: 10 INJECTION, SOLUTION INTRAVENOUS at 17:04

## 2025-01-04 RX ADMIN — HEPARIN SODIUM 7500 UNITS: 5000 INJECTION INTRAVENOUS; SUBCUTANEOUS at 21:32

## 2025-01-04 RX ADMIN — ISOSORBIDE DINITRATE 20 MG: 10 TABLET ORAL at 20:28

## 2025-01-04 RX ADMIN — ATORVASTATIN CALCIUM 40 MG: 40 TABLET, FILM COATED ORAL at 20:21

## 2025-01-04 RX ADMIN — NEPHROCAP 1 CAPSULE: 1 CAP ORAL at 08:16

## 2025-01-04 RX ADMIN — FOLIC ACID 1 MG: 1 TABLET ORAL at 08:08

## 2025-01-04 RX ADMIN — GABAPENTIN 300 MG: 300 CAPSULE ORAL at 20:21

## 2025-01-04 RX ADMIN — HEPARIN SODIUM 7500 UNITS: 5000 INJECTION INTRAVENOUS; SUBCUTANEOUS at 13:52

## 2025-01-04 RX ADMIN — FUROSEMIDE 80 MG: 10 INJECTION, SOLUTION INTRAVENOUS at 10:06

## 2025-01-04 RX ADMIN — ISOSORBIDE DINITRATE 20 MG: 10 TABLET ORAL at 13:52

## 2025-01-04 RX ADMIN — CLONIDINE HYDROCHLORIDE 0.1 MG: 0.1 TABLET ORAL at 08:08

## 2025-01-04 RX ADMIN — HYDRALAZINE HYDROCHLORIDE 25 MG: 10 TABLET, FILM COATED ORAL at 08:08

## 2025-01-04 RX ADMIN — ACETAMINOPHEN 650 MG: 325 TABLET ORAL at 11:08

## 2025-01-04 RX ADMIN — ISOSORBIDE DINITRATE 20 MG: 10 TABLET ORAL at 08:08

## 2025-01-04 RX ADMIN — INSULIN GLARGINE 32 UNITS: 100 INJECTION, SOLUTION SUBCUTANEOUS at 08:09

## 2025-01-04 ASSESSMENT — COGNITIVE AND FUNCTIONAL STATUS - GENERAL
MOBILITY SCORE: 21
STANDING UP FROM CHAIR USING ARMS: A LITTLE
CLIMB 3 TO 5 STEPS WITH RAILING: A LITTLE
DRESSING REGULAR UPPER BODY CLOTHING: A LITTLE
DRESSING REGULAR LOWER BODY CLOTHING: A LITTLE
STANDING UP FROM CHAIR USING ARMS: A LITTLE
MOBILITY SCORE: 21
HELP NEEDED FOR BATHING: A LITTLE
WALKING IN HOSPITAL ROOM: A LITTLE
WALKING IN HOSPITAL ROOM: A LITTLE
DAILY ACTIVITIY SCORE: 21
DRESSING REGULAR UPPER BODY CLOTHING: A LITTLE
CLIMB 3 TO 5 STEPS WITH RAILING: A LITTLE
HELP NEEDED FOR BATHING: A LITTLE
DAILY ACTIVITIY SCORE: 21
DRESSING REGULAR LOWER BODY CLOTHING: A LITTLE

## 2025-01-04 ASSESSMENT — PAIN SCALES - GENERAL
PAINLEVEL_OUTOF10: 3
PAINLEVEL_OUTOF10: 4
PAINLEVEL_OUTOF10: 8

## 2025-01-04 ASSESSMENT — PAIN DESCRIPTION - LOCATION
LOCATION: HEAD
LOCATION: HEAD

## 2025-01-04 ASSESSMENT — PAIN - FUNCTIONAL ASSESSMENT
PAIN_FUNCTIONAL_ASSESSMENT: 0-10
PAIN_FUNCTIONAL_ASSESSMENT: 0-10

## 2025-01-04 ASSESSMENT — PAIN DESCRIPTION - DESCRIPTORS: DESCRIPTORS: ACHING

## 2025-01-04 ASSESSMENT — PAIN DESCRIPTION - ORIENTATION: ORIENTATION: RIGHT

## 2025-01-04 NOTE — PROGRESS NOTES
Subjective Data:   Patient seen this AM, family at the bedside. Patient reports some improvement in SOB and LE edema, but remains on supplemental O2 with residual LE swelling.     Today in brief:  - final dose of clonidine today, hydralazine increased to 50 mg TID  - 80 mg IV lasix X1-> will repeat afternoon RFP and determine additional diuretic dose today  - will attempt to wean supplemental O2 today  - Hgb 9.4 (prev. 11.1)-> pt denies any dark/bloody stool, will continue to trend    Objective Data:  Last Recorded Vitals:  Vitals:    01/03/25 2125 01/04/25 0205 01/04/25 0449 01/04/25 0622   BP: 162/84 149/76 163/82    BP Location: Right arm Right arm Right arm    Patient Position: Lying Lying Lying    Pulse: 67 70 68    Resp: 18 18 18    Temp: 36.5 °C (97.7 °F) 36.6 °C (97.9 °F) 36.6 °C (97.9 °F)    TempSrc: Temporal Temporal Temporal    SpO2: 98% 97% 96%    Weight:    106 kg (234 lb)   Height:           Last Labs:  CBC - 1/3/2025:  8:09 PM  5.3 9.4 285    30.0      CMP - 1/4/2025:  7:00 AM  8.4 6.9 14 --- 0.4   4.9 2.9 12 114      PTT - 4/15/2024:  9:28 PM  0.9   10.3 31     TROPHS   Date/Time Value Ref Range Status   01/02/2025 12:53 PM 21 0 - 34 ng/L Final   02/03/2024 01:22 AM 12 0 - 34 ng/L Final   01/24/2024 08:32 PM 25 0 - 34 ng/L Final   01/25/2023 04:58 PM 7 0 - 34 ng/L Final     Comment:     .  Less than 99th percentile of normal range cutoff-  Female and children under 18 years old <35 ng/L; Male <54 ng/L: Negative  Repeat testing should be performed if clinically indicated.   .  Female and children under 18 years old  ng/L; Male  ng/L:  Consistent with possible cardiac damage and possible increased clinical   risk. Serial measurements may help to assess extent of myocardial damage.   .  >120 ng/L: Consistent with cardiac damage, increased clinical risk and  myocardial infarction. Serial measurements may help assess extent of   myocardial damage.   .   NOTE: Children less than 1 year old may  have higher baseline troponin   levels and results should be interpreted in conjunction with the overall   clinical context.  .  NOTE: Troponin I testing is performed using a different   testing methodology at [facility] than at other   system hospitals. Direct result comparisons should only   be made within the same method.     01/25/2023 04:16 PM 8 0 - 34 ng/L Final     Comment:     .  Less than 99th percentile of normal range cutoff-  Female and children under 18 years old <35 ng/L; Male <54 ng/L: Negative  Repeat testing should be performed if clinically indicated.   .  Female and children under 18 years old  ng/L; Male  ng/L:  Consistent with possible cardiac damage and possible increased clinical   risk. Serial measurements may help to assess extent of myocardial damage.   .  >120 ng/L: Consistent with cardiac damage, increased clinical risk and  myocardial infarction. Serial measurements may help assess extent of   myocardial damage.   .   NOTE: Children less than 1 year old may have higher baseline troponin   levels and results should be interpreted in conjunction with the overall   clinical context.  .  NOTE: Troponin I testing is performed using a different   testing methodology at [facility] than at other   [facility]. Direct result comparisons should only   be made within the same method.       BNP   Date/Time Value Ref Range Status   01/02/2025 12:53  0 - 99 pg/mL Final   02/03/2024 01:22  0 - 99 pg/mL Final     HGBA1C   Date/Time Value Ref Range Status   01/02/2025 06:35 PM 8.7 See comment % Final   11/19/2024 02:06 PM 9 4.2 - 6.5 % Final   07/19/2024 08:52 AM 11.2 4.2 - 6.5 % Final     VLDL   Date/Time Value Ref Range Status   05/08/2023 09:35 AM 38 0 - 40 mg/dL Final   04/12/2021 09:06 AM 21 0 - 40 mg/dL Final   10/20/2020 12:36 PM 24 0 - 40 mg/dL Final      Last I/O:  I/O last 3 completed shifts:  In: 1440 (13.6 mL/kg) [P.O.:1440]  Out: 2700  (25.4 mL/kg) [Urine:2700 (0.7 mL/kg/hr)]  Weight: 106.1 kg     Past Cardiology Tests (Last 3 Years):    Echo:  Transthoracic Echo (TTE) Complete 01/25/2024     PHYSICIAN INTERPRETATION:  Left Ventricle: The left ventricular systolic function is hyperdynamic, with an estimated ejection fraction of 70-75%. There are no regional wall motion abnormalities. The left ventricular cavity size is normal. The left ventricular septal wall thickness is mildly increased. There is mild concentric left ventricular hypertrophy. Spectral Doppler shows a normal pattern of left ventricular diastolic filling.  Left Atrium: The left atrium is mildly dilated.  Right Ventricle: The right ventricle was not well visualized. There is normal right ventricular global systolic function.  Right Atrium: The right atrium is normal in size.  Aortic Valve: The aortic valve is trileaflet. There is no evidence of aortic valve regurgitation. The peak instantaneous gradient of the aortic valve is 10.0 mmHg. The mean gradient of the aortic valve is 5.0 mmHg.  Mitral Valve: The mitral valve is normal in structure. There is trace to mild mitral valve regurgitation.  Tricuspid Valve: The tricuspid valve is structurally normal. There is trace to mild tricuspid regurgitation. The Doppler estimated RVSP is within normal limits at 32.4 mmHg.  Pulmonic Valve: The pulmonic valve is structurally normal. There is physiologic pulmonic valve regurgitation.  Pericardium: There is a trivial pericardial effusion.  Pleural: There is left pleural effusion.  Aorta: The aortic root is normal.      CONCLUSIONS:  1. Left ventricular systolic function is hyperdynamic with a 70-75% estimated ejection fraction.  2. RVSP within normal limits.    ** Final **    Transthoracic Echo (TTE) Complete 01/3/2025    CONCLUSIONS:   1. Left ventricular ejection fraction is normal, calculated by Argueta's biplane at 69%.   2. Spectral Doppler shows a Grade II (pseudonormal pattern) of left  ventricular diastolic filling with an elevated left atrial pressure.   3. There is normal right ventricular global systolic function.   4. The left atrium is moderately dilated.   5. Moderate mitral valve regurgitation.   6. Mechanism of MR Is possibly restricted motion of the posterior leaflet.   7. Moderate tricuspid regurgitation visualized.   8. Moderately elevated right ventricular systolic pressure.   9. Small pericardial effusion.  10. Compared with study dated 1/25/2024, the degree of MR appears moderate today rather than mild on prior study. TR is also worsened.    Inpatient Medications:  Scheduled medications   Medication Dose Route Frequency    amLODIPine  10 mg oral Daily    aspirin  81 mg oral Daily    atorvastatin  40 mg oral Nightly    [Held by provider] cloNIDine  0.1 mg oral Daily    folic acid  1 mg oral Daily    furosemide  80 mg intravenous Once    gabapentin  300 mg oral BID    heparin (porcine)  7,500 Units subcutaneous q8h NEHEMIAS    hydrALAZINE  50 mg oral TID    insulin glargine  32 Units subcutaneous Daily    insulin lispro  0-10 Units subcutaneous TID AC    isosorbide dinitrate  20 mg oral TID    polyethylene glycol  17 g oral Daily    vitamin B complex-vitamin C-folic acid  1 capsule oral Daily     PRN medications   Medication    acetaminophen    albuterol    dextrose    dextrose    glucagon    glucagon    hydrALAZINE    oxygen    oxygen     Continuous Medications   Medication Dose Last Rate       Physical Exam:  General: NAD, alert  Skin: warm and dry throughout, no abrasions or ecchymosis  Head/ neck: + mid neck JVD seen at 45  Cardiac: RRR, no murmurs or rubs  Pulm: diminished bases, on supplementsl O2  GI: soft, nontender, non-distended, +BS  Extremities: 1+ pitting to knee  Neuro: no focal neuro deficits, A&Ox3  Psych: appropriate mood and behavior, pleasant       Assessment/Plan   Nuris Squires is a 59 y.o. female presenting with PMH of poorly controlled insulin dependent type 2  diabetes c/b neuropathy, hyperlipidemia, PAD, CKD stage IV, hypertension, multiple foot infections s/p partial R great toe amputation, fall w/L femur fracture s/p ORIF (4/2024) that presents to the emergency department today for shortness of breath x1 day. Admitted under [facility] Service for further management of acute ADHF and HTN Urgency     Acute diastolic heart failure  Chest tightness, resolved  - likely 2/2 uncontrolled HTN  - TTE (1/2024): EF 70-75%  - TTE 1/3/2025: EF 69%, grade II diastolic filling, moderate MR, moderate TR, small pericardial effusion  - neg stress 10/2019  - CXR: pulm vascular congestion BL, small bibasilar pleural effusions  -  (ISO obesity)   - HS trop 21, no ischemic changes on ECG, no c/f ACS  - dry weight ~100kg  - admit weight: 107kg  - daily weight: 106 kg  - of note, pharmacy verified patient on PO lasix 20mg daily at home (?for unclear reasons, presumably prescribed by PCP for leg swelling)  - received IV lasix 80 mg BID on 1/3 with 1.3 L net negative  - continue diuretics with IV lasix 80 mg today-> will repeat afternoon RFP and determine additional diuretic dose today  - continue isordil 20 mg TID, hydralazine increased to 50 mg TID today  - Unable to add robyn/ ACE/ARB/ARNI due to current renal function  - May consider SGLT2i later into admission  - Daily standing weights, strict I&O's, 2g sodium diet, 2L fluid restriction      Acute hypoxic respiratory failure iso of ADHF exacerbation, improving  - p.ox initially at 61% but improved rapidly with placement of nasal cannula  - She was requiring 6 L of oxygen, BiPAP briefly due to the concern for SCAPE, received SL nitro and she was able to wean off of BiPAP  - Currently back on nasal cannula at 4 L (RA at baseline), satting >95%  - Flu A/B/RSV/COVID negative  - diuresis plan as above  - will attempt to wean O2 as able      HTN urgency, improving  - BP on on admit: 220/98  - SBP improved to 143 after resumption of home meds  and s/p IVP Hydral 5mg x1  - last 24 hour SBP range: 127- 163  - attempting to wean off home clonidine (last dose today 1/4)  - continue isordil 20 mg TID, hydralazine increased to 50 mg TID today, amlodipine 10 mg daily  - PRN IVP Hydral 5mg q6hrs SBP>180  - Home meds: clonidine 0.1mg BID, amlodipine 10mg daily     CKD IV  - Cr last year 1.7-5.3, worsening since 2022  - admit Cr 2.34  - daily Cr 2.55 (2.49, 2.31)  - likely cardiorenal 2/2 ADHF, diuresis plan as above  - trend daily    Insulin dependent Type 2 diabetes  Poorly controlled  - Follow with clinical pharmacy closely/PCP  - Hgb A1c 8.7% (improved from previous 11.9%)  - home regimen: Mounjaro 2.5mg weekly, basaglar 32 units every morning, admelog SS with meals  - c/w lantus 32 units every morning and SSI #2 while inpatient  - last 24 hour BG range: 72- 205  - OARRS reviewed, cont. Home gabapentin 300mg BID      HLD  PAD  - c/w asa 81mg daily, atorva 40mg daily      DVT ppx: subQ heparin  Emergency contacts: Boyfrienterrie Rousseau #[phone]  Brother Luis Squires #[phone]  DISPO: pending further diuresis/HF med optimization     All labs, vital signs, tests & imaging results, and medications were reviewed.     Code Status:  Full Code    Patient seen and discussed with Dr. Vidal Smith, APRN-CNP

## 2025-01-04 NOTE — ED PROCEDURE NOTE
Procedure    Performed by: Valdo Whatley DO  Authorized by: Valdo Whatley DO                Respiratory Indications: shortness of breath, tachypnea and hypoxia  Procedure: Cardiac Ultrasound    Findings:   Views: parasternal long, parasternal short and apical four  Effusion: The pericardial space was visualized and was positive for a PERICARDIAL EFFUSION.  Activity: Ventricular contractions were visualized.  LV: LV systolic function was NORMAL.  RV: RV size was NORMAL.    Impression:  Cardiac: The focused cardiac ultrasound exam had ABNORMAL findings as specified.                   Valdo Whatley DO  Resident  01/04/25 1118

## 2025-01-05 VITALS
WEIGHT: 234.3 LBS | SYSTOLIC BLOOD PRESSURE: 144 MMHG | HEIGHT: 62 IN | BODY MASS INDEX: 43.12 KG/M2 | RESPIRATION RATE: 18 BRPM | DIASTOLIC BLOOD PRESSURE: 78 MMHG | TEMPERATURE: 97.9 F | OXYGEN SATURATION: 92 % | HEART RATE: 78 BPM

## 2025-01-05 LAB
ALBUMIN SERPL BCP-MCNC: 3.1 G/DL (ref 3.4–5)
ANION GAP SERPL CALC-SCNC: 14 MMOL/L (ref 10–20)
BNP SERPL-MCNC: 161 PG/ML (ref 0–99)
BUN SERPL-MCNC: 46 MG/DL (ref 6–23)
CALCIUM SERPL-MCNC: 8.4 MG/DL (ref 8.6–10.6)
CHLORIDE SERPL-SCNC: 102 MMOL/L (ref 98–107)
CO2 SERPL-SCNC: 24 MMOL/L (ref 21–32)
CREAT SERPL-MCNC: 2.93 MG/DL (ref 0.5–1.05)
EGFRCR SERPLBLD CKD-EPI 2021: 18 ML/MIN/1.73M*2
ERYTHROCYTE [DISTWIDTH] IN BLOOD BY AUTOMATED COUNT: 14.1 % (ref 11.5–14.5)
GLUCOSE BLD MANUAL STRIP-MCNC: 128 MG/DL (ref 74–99)
GLUCOSE BLD MANUAL STRIP-MCNC: 132 MG/DL (ref 74–99)
GLUCOSE BLD MANUAL STRIP-MCNC: 148 MG/DL (ref 74–99)
GLUCOSE BLD MANUAL STRIP-MCNC: 62 MG/DL (ref 74–99)
GLUCOSE BLD MANUAL STRIP-MCNC: 75 MG/DL (ref 74–99)
GLUCOSE BLD MANUAL STRIP-MCNC: 85 MG/DL (ref 74–99)
GLUCOSE SERPL-MCNC: 141 MG/DL (ref 74–99)
HCT VFR BLD AUTO: 27.9 % (ref 36–46)
HGB BLD-MCNC: 9.3 G/DL (ref 12–16)
MAGNESIUM SERPL-MCNC: 2.5 MG/DL (ref 1.6–2.4)
MCH RBC QN AUTO: 29.5 PG (ref 26–34)
MCHC RBC AUTO-ENTMCNC: 33.3 G/DL (ref 32–36)
MCV RBC AUTO: 89 FL (ref 80–100)
NRBC BLD-RTO: 0 /100 WBCS (ref 0–0)
PHOSPHATE SERPL-MCNC: 5.1 MG/DL (ref 2.5–4.9)
PLATELET # BLD AUTO: 298 X10*3/UL (ref 150–450)
POTASSIUM SERPL-SCNC: 4.6 MMOL/L (ref 3.5–5.3)
RBC # BLD AUTO: 3.15 X10*6/UL (ref 4–5.2)
SODIUM SERPL-SCNC: 135 MMOL/L (ref 136–145)
WBC # BLD AUTO: 6.5 X10*3/UL (ref 4.4–11.3)

## 2025-01-05 PROCEDURE — 99232 SBSQ HOSP IP/OBS MODERATE 35: CPT | Performed by: STUDENT IN AN ORGANIZED HEALTH CARE EDUCATION/TRAINING PROGRAM

## 2025-01-05 PROCEDURE — 1200000002 HC GENERAL ROOM WITH TELEMETRY DAILY

## 2025-01-05 PROCEDURE — 2500000001 HC RX 250 WO HCPCS SELF ADMINISTERED DRUGS (ALT 637 FOR MEDICARE OP): Performed by: NURSE PRACTITIONER

## 2025-01-05 PROCEDURE — 2500000001 HC RX 250 WO HCPCS SELF ADMINISTERED DRUGS (ALT 637 FOR MEDICARE OP)

## 2025-01-05 PROCEDURE — 2500000004 HC RX 250 GENERAL PHARMACY W/ HCPCS (ALT 636 FOR OP/ED): Performed by: NURSE PRACTITIONER

## 2025-01-05 PROCEDURE — 2500000001 HC RX 250 WO HCPCS SELF ADMINISTERED DRUGS (ALT 637 FOR MEDICARE OP): Performed by: STUDENT IN AN ORGANIZED HEALTH CARE EDUCATION/TRAINING PROGRAM

## 2025-01-05 PROCEDURE — 36415 COLL VENOUS BLD VENIPUNCTURE: CPT | Performed by: NURSE PRACTITIONER

## 2025-01-05 PROCEDURE — 82947 ASSAY GLUCOSE BLOOD QUANT: CPT

## 2025-01-05 PROCEDURE — 80069 RENAL FUNCTION PANEL: CPT

## 2025-01-05 PROCEDURE — 85027 COMPLETE CBC AUTOMATED: CPT | Performed by: NURSE PRACTITIONER

## 2025-01-05 PROCEDURE — 83735 ASSAY OF MAGNESIUM: CPT | Performed by: NURSE PRACTITIONER

## 2025-01-05 PROCEDURE — 2500000002 HC RX 250 W HCPCS SELF ADMINISTERED DRUGS (ALT 637 FOR MEDICARE OP, ALT 636 FOR OP/ED)

## 2025-01-05 RX ORDER — FUROSEMIDE 40 MG/1
40 TABLET ORAL DAILY
Status: DISCONTINUED | OUTPATIENT
Start: 2025-01-05 | End: 2025-01-06

## 2025-01-05 RX ORDER — INSULIN GLARGINE 100 [IU]/ML
16 INJECTION, SOLUTION SUBCUTANEOUS DAILY
Status: DISCONTINUED | OUTPATIENT
Start: 2025-01-05 | End: 2025-01-14

## 2025-01-05 RX ORDER — ISOSORBIDE DINITRATE 10 MG/1
10 TABLET ORAL
Status: COMPLETED | OUTPATIENT
Start: 2025-01-05 | End: 2025-01-20

## 2025-01-05 RX ADMIN — GABAPENTIN 300 MG: 300 CAPSULE ORAL at 08:14

## 2025-01-05 RX ADMIN — HEPARIN SODIUM 7500 UNITS: 5000 INJECTION INTRAVENOUS; SUBCUTANEOUS at 06:40

## 2025-01-05 RX ADMIN — HYDRALAZINE HYDROCHLORIDE 50 MG: 50 TABLET ORAL at 20:58

## 2025-01-05 RX ADMIN — NEPHROCAP 1 CAPSULE: 1 CAP ORAL at 08:14

## 2025-01-05 RX ADMIN — HEPARIN SODIUM 7500 UNITS: 5000 INJECTION INTRAVENOUS; SUBCUTANEOUS at 20:59

## 2025-01-05 RX ADMIN — HEPARIN SODIUM 7500 UNITS: 5000 INJECTION INTRAVENOUS; SUBCUTANEOUS at 14:13

## 2025-01-05 RX ADMIN — ATORVASTATIN CALCIUM 40 MG: 40 TABLET, FILM COATED ORAL at 20:58

## 2025-01-05 RX ADMIN — HYDRALAZINE HYDROCHLORIDE 50 MG: 50 TABLET ORAL at 14:13

## 2025-01-05 RX ADMIN — INSULIN GLARGINE 16 UNITS: 100 INJECTION, SOLUTION SUBCUTANEOUS at 09:45

## 2025-01-05 RX ADMIN — GABAPENTIN 300 MG: 300 CAPSULE ORAL at 20:58

## 2025-01-05 RX ADMIN — ACETAMINOPHEN 650 MG: 325 TABLET ORAL at 23:47

## 2025-01-05 RX ADMIN — ISOSORBIDE DINITRATE 20 MG: 10 TABLET ORAL at 14:13

## 2025-01-05 RX ADMIN — ASPIRIN 81 MG: 81 TABLET, CHEWABLE ORAL at 08:14

## 2025-01-05 RX ADMIN — ACETAMINOPHEN 650 MG: 325 TABLET ORAL at 15:08

## 2025-01-05 RX ADMIN — FUROSEMIDE 40 MG: 40 TABLET ORAL at 14:13

## 2025-01-05 RX ADMIN — HYDRALAZINE HYDROCHLORIDE 50 MG: 50 TABLET ORAL at 08:14

## 2025-01-05 RX ADMIN — ACETAMINOPHEN 650 MG: 325 TABLET ORAL at 06:42

## 2025-01-05 RX ADMIN — AMLODIPINE BESYLATE 10 MG: 10 TABLET ORAL at 08:14

## 2025-01-05 RX ADMIN — ISOSORBIDE DINITRATE 10 MG: 10 TABLET ORAL at 18:21

## 2025-01-05 RX ADMIN — ISOSORBIDE DINITRATE 20 MG: 10 TABLET ORAL at 08:14

## 2025-01-05 RX ADMIN — FOLIC ACID 1 MG: 1 TABLET ORAL at 08:14

## 2025-01-05 ASSESSMENT — COGNITIVE AND FUNCTIONAL STATUS - GENERAL
DAILY ACTIVITIY SCORE: 21
DRESSING REGULAR UPPER BODY CLOTHING: A LITTLE
DRESSING REGULAR LOWER BODY CLOTHING: A LITTLE
DRESSING REGULAR LOWER BODY CLOTHING: A LITTLE
HELP NEEDED FOR BATHING: A LITTLE
MOBILITY SCORE: 21
WALKING IN HOSPITAL ROOM: A LITTLE
MOBILITY SCORE: 21
STANDING UP FROM CHAIR USING ARMS: A LITTLE
WALKING IN HOSPITAL ROOM: A LITTLE
DAILY ACTIVITIY SCORE: 21
CLIMB 3 TO 5 STEPS WITH RAILING: A LITTLE
HELP NEEDED FOR BATHING: A LITTLE
CLIMB 3 TO 5 STEPS WITH RAILING: A LITTLE
DRESSING REGULAR UPPER BODY CLOTHING: A LITTLE
STANDING UP FROM CHAIR USING ARMS: A LITTLE

## 2025-01-05 ASSESSMENT — PAIN - FUNCTIONAL ASSESSMENT
PAIN_FUNCTIONAL_ASSESSMENT: 0-10

## 2025-01-05 ASSESSMENT — PAIN SCALES - GENERAL
PAINLEVEL_OUTOF10: 3
PAINLEVEL_OUTOF10: 2
PAINLEVEL_OUTOF10: 3
PAINLEVEL_OUTOF10: 8
PAINLEVEL_OUTOF10: 0 - NO PAIN

## 2025-01-05 ASSESSMENT — PAIN DESCRIPTION - LOCATION
LOCATION: HEAD
LOCATION: HEAD

## 2025-01-05 NOTE — PROGRESS NOTES
Subjective Data:   Patient seen this afternoon, reports a 6/10 headache on exam that is responding the tylenol. Weaned to RA, remains with trace BL LE.     Today in brief:  - Cr up to 2.74 today-> switched from IV lasix 80 mg BID to 40 mg PO lasix as appears to be nearing euvolemia    - weaned to RA today, BNP down to 161  - BPs labile, clonidine weaned off today  - pt reporting on/off headache (responding to tylenol)-> will reduce isordil to 10 as may be a possible SE and can consider further hydralazine uptitration   - AM lantus decreased to 16 units due to BG of 62 this AM    Objective Data:  Last Recorded Vitals:  Vitals:    01/04/25 2033 01/05/25 0132 01/05/25 0510 01/05/25 0812   BP: 126/72 146/80 96/58 160/82   BP Location: Left arm Left arm Right arm Left arm   Patient Position: Lying Lying Lying Lying   Pulse: 74 79 69 74   Resp: 18 17 16 16   Temp: 35.9 °C (96.6 °F) 37 °C (98.6 °F) 36.3 °C (97.3 °F) 36 °C (96.8 °F)   TempSrc: Temporal Temporal Temporal Temporal   SpO2: 95% 98% 95% 95%   Weight:       Height:           Last Labs:  CBC - 1/4/2025:  9:19 PM  6.9 9.8 329    31.6      CMP - 1/4/2025:  9:19 PM  8.8 6.9 14 --- 0.4   4.9 2.9 12 114      PTT - 4/15/2024:  9:28 PM  0.9   10.3 31     TROPHS   Date/Time Value Ref Range Status   01/02/2025 12:53 PM 21 0 - 34 ng/L Final   02/03/2024 01:22 AM 12 0 - 34 ng/L Final   01/24/2024 08:32 PM 25 0 - 34 ng/L Final   01/25/2023 04:58 PM 7 0 - 34 ng/L Final     Comment:     .  Less than 99th percentile of normal range cutoff-  Female and children under 18 years old <35 ng/L; Male <54 ng/L: Negative  Repeat testing should be performed if clinically indicated.   .  Female and children under 18 years old  ng/L; Male  ng/L:  Consistent with possible cardiac damage and possible increased clinical   risk. Serial measurements may help to assess extent of myocardial damage.   .  >120 ng/L: Consistent with cardiac damage, increased clinical risk and  myocardial  infarction. Serial measurements may help assess extent of   myocardial damage.   .   NOTE: Children less than 1 year old may have higher baseline troponin   levels and results should be interpreted in conjunction with the overall   clinical context.  .  NOTE: Troponin I testing is performed using a different   testing methodology at [facility] than at other   [facility]. Direct result comparisons should only   be made within the same method.     01/25/2023 04:16 PM 8 0 - 34 ng/L Final     Comment:     .  Less than 99th percentile of normal range cutoff-  Female and children under 18 years old <35 ng/L; Male <54 ng/L: Negative  Repeat testing should be performed if clinically indicated.   .  Female and children under 18 years old  ng/L; Male  ng/L:  Consistent with possible cardiac damage and possible increased clinical   risk. Serial measurements may help to assess extent of myocardial damage.   .  >120 ng/L: Consistent with cardiac damage, increased clinical risk and  myocardial infarction. Serial measurements may help assess extent of   myocardial damage.   .   NOTE: Children less than 1 year old may have higher baseline troponin   levels and results should be interpreted in conjunction with the overall   clinical context.  .  NOTE: Troponin I testing is performed using a different   testing methodology at [facility] than at other   [facility]. Direct result comparisons should only   be made within the same method.       BNP   Date/Time Value Ref Range Status   01/02/2025 12:53  0 - 99 pg/mL Final   02/03/2024 01:22  0 - 99 pg/mL Final     HGBA1C   Date/Time Value Ref Range Status   01/02/2025 06:35 PM 8.7 See comment % Final   11/19/2024 02:06 PM 9 4.2 - 6.5 % Final   07/19/2024 08:52 AM 11.2 4.2 - 6.5 % Final     VLDL   Date/Time Value Ref Range Status   05/08/2023 09:35 AM 38 0 - 40 mg/dL Final   04/12/2021 09:06 AM 21 0 - 40 mg/dL Final    10/20/2020 12:36 PM 24 0 - 40 mg/dL Final      Last I/O:  I/O last 3 completed shifts:  In: 840 (7.9 mL/kg) [P.O.:840]  Out: 2600 (24.5 mL/kg) [Urine:2600 (0.7 mL/kg/hr)]  Weight: 106.1 kg     Past Cardiology Tests (Last 3 Years):    Echo:  Transthoracic Echo (TTE) Complete 01/25/2024     PHYSICIAN INTERPRETATION:  Left Ventricle: The left ventricular systolic function is hyperdynamic, with an estimated ejection fraction of 70-75%. There are no regional wall motion abnormalities. The left ventricular cavity size is normal. The left ventricular septal wall thickness is mildly increased. There is mild concentric left ventricular hypertrophy. Spectral Doppler shows a normal pattern of left ventricular diastolic filling.  Left Atrium: The left atrium is mildly dilated.  Right Ventricle: The right ventricle was not well visualized. There is normal right ventricular global systolic function.  Right Atrium: The right atrium is normal in size.  Aortic Valve: The aortic valve is trileaflet. There is no evidence of aortic valve regurgitation. The peak instantaneous gradient of the aortic valve is 10.0 mmHg. The mean gradient of the aortic valve is 5.0 mmHg.  Mitral Valve: The mitral valve is normal in structure. There is trace to mild mitral valve regurgitation.  Tricuspid Valve: The tricuspid valve is structurally normal. There is trace to mild tricuspid regurgitation. The Doppler estimated RVSP is within normal limits at 32.4 mmHg.  Pulmonic Valve: The pulmonic valve is structurally normal. There is physiologic pulmonic valve regurgitation.  Pericardium: There is a trivial pericardial effusion.  Pleural: There is left pleural effusion.  Aorta: The aortic root is normal.    CONCLUSIONS:  1. Left ventricular systolic function is hyperdynamic with a 70-75% estimated ejection fraction.  2. RVSP within normal limits.    ** Final **    Transthoracic Echo (TTE) Complete 01/3/2025  CONCLUSIONS:   1. Left ventricular ejection  fraction is normal, calculated by Argueta's biplane at 69%.   2. Spectral Doppler shows a Grade II (pseudonormal pattern) of left ventricular diastolic filling with an elevated left atrial pressure.   3. There is normal right ventricular global systolic function.   4. The left atrium is moderately dilated.   5. Moderate mitral valve regurgitation.   6. Mechanism of MR Is possibly restricted motion of the posterior leaflet.   7. Moderate tricuspid regurgitation visualized.   8. Moderately elevated right ventricular systolic pressure.   9. Small pericardial effusion.  10. Compared with study dated 1/25/2024, the degree of MR appears moderate today rather than mild on prior study. TR is also worsened.    Inpatient Medications:  Scheduled medications   Medication Dose Route Frequency    amLODIPine  10 mg oral Daily    aspirin  81 mg oral Daily    atorvastatin  40 mg oral Nightly    [Held by provider] cloNIDine  0.1 mg oral Daily    folic acid  1 mg oral Daily    gabapentin  300 mg oral BID    heparin (porcine)  7,500 Units subcutaneous q8h NEHEMIAS    hydrALAZINE  50 mg oral TID    insulin glargine  16 Units subcutaneous Daily    insulin lispro  0-10 Units subcutaneous TID AC    isosorbide dinitrate  20 mg oral TID    polyethylene glycol  17 g oral Daily    vitamin B complex-vitamin C-folic acid  1 capsule oral Daily     PRN medications   Medication    acetaminophen    albuterol    dextrose    dextrose    glucagon    glucagon    hydrALAZINE    oxygen    oxygen     Continuous Medications   Medication Dose Last Rate     Physical Exam:  General: NAD, alert, uncomfortable appearing (reported headache)  Skin: warm and dry throughout, no abrasions or ecchymosis  Head/ neck: unable to appreciate JVD due to body positioning  Cardiac: RRR, no murmurs or rubs  Pulm: diminished bases, on room air   GI: soft, nontender, non-distended, +BS  Extremities: trace BL LE edema, warm throughout   Neuro: no focal neuro deficits, A&Ox3  Psych:  appropriate mood and behavior, pleasant       Assessment/Plan   Nuris Squires is a 59 y.o. female presenting with PMH of poorly controlled insulin dependent type 2 diabetes c/b neuropathy, hyperlipidemia, PAD, CKD stage IV, hypertension, multiple foot infections s/p partial R great toe amputation, fall w/L femur fracture s/p ORIF (4/2024) that presents to the emergency department today for shortness of breath x1 day. Admitted under HHVI Service for further management of acute ADHF and HTN Urgency.     Acute diastolic heart failure  Chest tightness, resolved  - likely 2/2 uncontrolled HTN  - TTE (1/2024): EF 70-75%  - TTE 1/3/2025: EF 69%, grade II diastolic filling, moderate MR, moderate TR, small pericardial effusion  - neg stress 10/2019  - CXR: pulm vascular congestion BL, small bibasilar pleural effusions  -  (ISO obesity) -> down to 161 today  - HS trop 21, no ischemic changes on ECG, no c/f ACS  - dry weight ~100kg  - admit weight: 107kg  - daily weight: pending (106 kg)  - of note, pharmacy verified patient on PO lasix 20mg daily at home (?for unclear reasons, presumably prescribed by PCP for leg swelling)-> per patient, was just started on it at home  - received IV lasix 80 mg BID on 1/4 with 1.3 L net negative  - BNP down, weaned off O2, LE improving-> switched to PO lasix 40 mg daily today  - continue hydralazine 50 mg TID,  isordil reduced to 10 mg TID due to recurrent headache  - Unable to add robyn/ SGLTII due to current renal function  - Daily standing weights, strict I&O's, 2g sodium diet, 2L fluid restriction      Acute hypoxic respiratory failure iso of ADHF exacerbation, improving  - p.ox initially at 61% but improved rapidly with placement of nasal cannula  - She was requiring 6 L of oxygen, BiPAP briefly due to the concern for SCAPE, received SL nitro and she was able to wean off of BiPAP  - Flu A/B/RSV/COVID negative  - diuresis plan as above  - weaned to RA today     HTN urgency,  improving  - BP on on admit: 220/98  - SBP improved to 143 after resumption of home meds and s/p IVP Hydral 5mg x1  - last 24 hour SBP range: 96- 160  - BPs labile, clonidine weaned off today-> continue hydral 50 mg TID  - pt reporting on/off headache (responding to tylenol)-> will reduce isordil to 10 as may be a possible SE and can consider further hydralazine uptitration   - continue isordil 10 mg TID, hydralazine 50 mg TID, amlodipine 10 mg daily  - PRN IVP Hydral 5mg q6hrs SBP>180  - Home meds: clonidine 0.1mg BID, amlodipine 10mg daily     CKD IV  - Cr last year 1.7-5.3, worsening since 2022  - admit Cr 2.34  - daily Cr 2.74 (2.55, 2.49, 2.31)  - likely cardiorenal 2/2 ADHF, diuresis plan as above  - trend daily    Insulin dependent Type 2 diabetes  Poorly controlled  - Follow with clinical pharmacy closely/PCP  - Hgb A1c 8.7% (improved from previous 11.9%)  - home regimen: Mounjaro 2.5mg weekly, basaglar 32 units every morning, admelog SS with meals  - c/w lantus 32 units every morning and SSI #2 while inpatient-> AM lantus decreased to 16 units due to BG of 62 this AM  - last 24 hour BG range:    - OARRS reviewed, cont. Home gabapentin 300mg BID      HLD  PAD  - c/w asa 81mg daily, atorva 40mg daily      DVT ppx: subQ heparin  Emergency contacts: Boyfrienterrie Rousseau #[phone]  Brother Luis Squires #[phone]  DISPO: pending further HF med optimization as able with renal function; possible DC tomorrow, will need close HF follow- up established      All labs, vital signs, tests & imaging results, and medications were reviewed.     Code Status:  Full Code    Patient seen and discussed with Dr. Vidal Smith, APRN-CNP

## 2025-01-06 PROBLEM — N18.4 HYPERTENSIVE HEART AND KIDNEY DISEASE WITH HF AND WITH CKD STAGE IV: Status: ACTIVE | Noted: 2025-01-06

## 2025-01-06 PROBLEM — I13.0 HYPERTENSIVE HEART AND KIDNEY DISEASE WITH HF AND WITH CKD STAGE IV: Status: ACTIVE | Noted: 2025-01-06

## 2025-01-06 LAB
ERYTHROCYTE [DISTWIDTH] IN BLOOD BY AUTOMATED COUNT: 14.4 % (ref 11.5–14.5)
GLUCOSE BLD MANUAL STRIP-MCNC: 123 MG/DL (ref 74–99)
GLUCOSE BLD MANUAL STRIP-MCNC: 128 MG/DL (ref 74–99)
GLUCOSE BLD MANUAL STRIP-MCNC: 135 MG/DL (ref 74–99)
GLUCOSE BLD MANUAL STRIP-MCNC: 168 MG/DL (ref 74–99)
GLUCOSE BLD MANUAL STRIP-MCNC: 177 MG/DL (ref 74–99)
HCT VFR BLD AUTO: 30.8 % (ref 36–46)
HGB BLD-MCNC: 9.9 G/DL (ref 12–16)
MAGNESIUM SERPL-MCNC: 2.1 MG/DL (ref 1.6–2.4)
MCH RBC QN AUTO: 29.8 PG (ref 26–34)
MCHC RBC AUTO-ENTMCNC: 32.1 G/DL (ref 32–36)
MCV RBC AUTO: 93 FL (ref 80–100)
NRBC BLD-RTO: 0 /100 WBCS (ref 0–0)
PLATELET # BLD AUTO: 335 X10*3/UL (ref 150–450)
RBC # BLD AUTO: 3.32 X10*6/UL (ref 4–5.2)
WBC # BLD AUTO: 6.5 X10*3/UL (ref 4.4–11.3)

## 2025-01-06 PROCEDURE — 1200000002 HC GENERAL ROOM WITH TELEMETRY DAILY

## 2025-01-06 PROCEDURE — 2500000001 HC RX 250 WO HCPCS SELF ADMINISTERED DRUGS (ALT 637 FOR MEDICARE OP): Performed by: STUDENT IN AN ORGANIZED HEALTH CARE EDUCATION/TRAINING PROGRAM

## 2025-01-06 PROCEDURE — 2500000005 HC RX 250 GENERAL PHARMACY W/O HCPCS: Performed by: STUDENT IN AN ORGANIZED HEALTH CARE EDUCATION/TRAINING PROGRAM

## 2025-01-06 PROCEDURE — 83735 ASSAY OF MAGNESIUM: CPT | Performed by: NURSE PRACTITIONER

## 2025-01-06 PROCEDURE — 2500000001 HC RX 250 WO HCPCS SELF ADMINISTERED DRUGS (ALT 637 FOR MEDICARE OP): Performed by: NURSE PRACTITIONER

## 2025-01-06 PROCEDURE — 2500000002 HC RX 250 W HCPCS SELF ADMINISTERED DRUGS (ALT 637 FOR MEDICARE OP, ALT 636 FOR OP/ED): Performed by: NURSE PRACTITIONER

## 2025-01-06 PROCEDURE — 85027 COMPLETE CBC AUTOMATED: CPT | Performed by: NURSE PRACTITIONER

## 2025-01-06 PROCEDURE — 82947 ASSAY GLUCOSE BLOOD QUANT: CPT

## 2025-01-06 PROCEDURE — 2500000001 HC RX 250 WO HCPCS SELF ADMINISTERED DRUGS (ALT 637 FOR MEDICARE OP)

## 2025-01-06 PROCEDURE — 2500000004 HC RX 250 GENERAL PHARMACY W/ HCPCS (ALT 636 FOR OP/ED): Performed by: NURSE PRACTITIONER

## 2025-01-06 PROCEDURE — 36415 COLL VENOUS BLD VENIPUNCTURE: CPT | Performed by: NURSE PRACTITIONER

## 2025-01-06 PROCEDURE — 99232 SBSQ HOSP IP/OBS MODERATE 35: CPT | Performed by: STUDENT IN AN ORGANIZED HEALTH CARE EDUCATION/TRAINING PROGRAM

## 2025-01-06 PROCEDURE — 2500000002 HC RX 250 W HCPCS SELF ADMINISTERED DRUGS (ALT 637 FOR MEDICARE OP, ALT 636 FOR OP/ED)

## 2025-01-06 PROCEDURE — 80069 RENAL FUNCTION PANEL: CPT | Performed by: NURSE PRACTITIONER

## 2025-01-06 RX ORDER — FUROSEMIDE 40 MG/1
40 TABLET ORAL
Status: DISCONTINUED | OUTPATIENT
Start: 2025-01-06 | End: 2025-01-08

## 2025-01-06 RX ORDER — FUROSEMIDE 40 MG/1
40 TABLET ORAL
Qty: 60 TABLET | Refills: 1 | Status: SHIPPED | OUTPATIENT
Start: 2025-01-07 | End: 2025-01-23 | Stop reason: HOSPADM

## 2025-01-06 RX ORDER — AMLODIPINE BESYLATE 10 MG/1
10 TABLET ORAL DAILY
Qty: 30 TABLET | Refills: 1 | Status: SHIPPED | OUTPATIENT
Start: 2025-01-06 | End: 2025-01-23 | Stop reason: HOSPADM

## 2025-01-06 RX ORDER — HYDRALAZINE HYDROCHLORIDE 50 MG/1
50 TABLET, FILM COATED ORAL 3 TIMES DAILY
Qty: 90 TABLET | Refills: 1 | Status: SHIPPED | OUTPATIENT
Start: 2025-01-06 | End: 2025-01-23

## 2025-01-06 RX ORDER — ATORVASTATIN CALCIUM 40 MG/1
40 TABLET, FILM COATED ORAL DAILY
Qty: 30 TABLET | Refills: 1 | Status: SHIPPED | OUTPATIENT
Start: 2025-01-06 | End: 2025-01-23

## 2025-01-06 RX ORDER — ASPIRIN 81 MG/1
81 TABLET ORAL
Qty: 30 TABLET | Refills: 1 | Status: SHIPPED | OUTPATIENT
Start: 2025-01-06 | End: 2025-01-23

## 2025-01-06 RX ORDER — ISOSORBIDE DINITRATE 10 MG/1
10 TABLET ORAL
Qty: 90 TABLET | Refills: 1 | Status: SHIPPED | OUTPATIENT
Start: 2025-01-07 | End: 2025-01-23 | Stop reason: HOSPADM

## 2025-01-06 RX ADMIN — ISOSORBIDE DINITRATE 10 MG: 10 TABLET ORAL at 18:11

## 2025-01-06 RX ADMIN — HYDRALAZINE HYDROCHLORIDE 50 MG: 50 TABLET ORAL at 13:32

## 2025-01-06 RX ADMIN — FUROSEMIDE 40 MG: 40 TABLET ORAL at 17:12

## 2025-01-06 RX ADMIN — HYDRALAZINE HYDROCHLORIDE 50 MG: 50 TABLET ORAL at 20:24

## 2025-01-06 RX ADMIN — ACETAMINOPHEN 650 MG: 325 TABLET ORAL at 08:12

## 2025-01-06 RX ADMIN — HEPARIN SODIUM 7500 UNITS: 5000 INJECTION INTRAVENOUS; SUBCUTANEOUS at 13:33

## 2025-01-06 RX ADMIN — NEPHROCAP 1 CAPSULE: 1 CAP ORAL at 08:12

## 2025-01-06 RX ADMIN — HEPARIN SODIUM 7500 UNITS: 5000 INJECTION INTRAVENOUS; SUBCUTANEOUS at 06:49

## 2025-01-06 RX ADMIN — HEPARIN SODIUM 7500 UNITS: 5000 INJECTION INTRAVENOUS; SUBCUTANEOUS at 20:24

## 2025-01-06 RX ADMIN — INSULIN GLARGINE 16 UNITS: 100 INJECTION, SOLUTION SUBCUTANEOUS at 08:13

## 2025-01-06 RX ADMIN — HYDRALAZINE HYDROCHLORIDE 50 MG: 50 TABLET ORAL at 08:13

## 2025-01-06 RX ADMIN — ISOSORBIDE DINITRATE 10 MG: 10 TABLET ORAL at 13:33

## 2025-01-06 RX ADMIN — ISOSORBIDE DINITRATE 10 MG: 10 TABLET ORAL at 08:13

## 2025-01-06 RX ADMIN — EMPAGLIFLOZIN 10 MG: 10 TABLET, FILM COATED ORAL at 13:35

## 2025-01-06 RX ADMIN — AMLODIPINE BESYLATE 10 MG: 10 TABLET ORAL at 08:12

## 2025-01-06 RX ADMIN — FUROSEMIDE 40 MG: 40 TABLET ORAL at 08:12

## 2025-01-06 RX ADMIN — GABAPENTIN 300 MG: 300 CAPSULE ORAL at 20:24

## 2025-01-06 RX ADMIN — GABAPENTIN 300 MG: 300 CAPSULE ORAL at 08:12

## 2025-01-06 RX ADMIN — INSULIN LISPRO 2 UNITS: 100 INJECTION, SOLUTION INTRAVENOUS; SUBCUTANEOUS at 17:12

## 2025-01-06 RX ADMIN — ASPIRIN 81 MG: 81 TABLET, CHEWABLE ORAL at 08:12

## 2025-01-06 RX ADMIN — ATORVASTATIN CALCIUM 40 MG: 40 TABLET, FILM COATED ORAL at 20:24

## 2025-01-06 RX ADMIN — Medication 2 L/MIN: at 08:11

## 2025-01-06 RX ADMIN — FOLIC ACID 1 MG: 1 TABLET ORAL at 08:12

## 2025-01-06 ASSESSMENT — COGNITIVE AND FUNCTIONAL STATUS - GENERAL
STANDING UP FROM CHAIR USING ARMS: A LITTLE
DAILY ACTIVITIY SCORE: 24
WALKING IN HOSPITAL ROOM: A LITTLE
STANDING UP FROM CHAIR USING ARMS: A LITTLE
WALKING IN HOSPITAL ROOM: A LITTLE
CLIMB 3 TO 5 STEPS WITH RAILING: A LITTLE
DAILY ACTIVITIY SCORE: 24
MOBILITY SCORE: 21
MOBILITY SCORE: 21
CLIMB 3 TO 5 STEPS WITH RAILING: A LITTLE

## 2025-01-06 ASSESSMENT — PAIN SCALES - GENERAL
PAINLEVEL_OUTOF10: 2
PAINLEVEL_OUTOF10: 0 - NO PAIN

## 2025-01-06 ASSESSMENT — PAIN - FUNCTIONAL ASSESSMENT
PAIN_FUNCTIONAL_ASSESSMENT: 0-10

## 2025-01-06 ASSESSMENT — PAIN DESCRIPTION - DESCRIPTORS: DESCRIPTORS: ACHING

## 2025-01-06 NOTE — CONSULTS
Heart Failure Nurse Navigator    The role of the HF nurse navigator is to (1) characterize risk profiles of patients with heart failure transitioning from [id number] after hospitalization, (2) recommend interventions to improve care and reduce risks of worse post-hospitalization outcomes. Potential recommendations may touch base on patient/family education, additional consults that may bring value, and appointment scheduling.    History  59 y.o. female presenting with PMH of poorly controlled insulin dependent type 2 diabetes c/b neuropathy, hyperlipidemia, PAD, CKD stage IV, hypertension, multiple foot infections s/p partial R great toe amputation, fall w/L femur fracture s/p ORIF (4/2024) that presented to the emergency department for shortness of breath x1 day. Admitted under HHVI Service for further management of acute ADHF and HTN Urgency.     I met with Nuris Squires at the bedside. We discussed diastolic heart failure education and preparation for transitioning from hospital to home. She lives independently and has no social concerns. She mentioned that she has been depressed for a couple of weeks.    Patients Cardiologist(s): None    Patients Primary Care Provider: Mone Aquino MD   Next appointment: 2/20/2025 10:30am    1. Medical Domain  What is the patient's most recent LVEF? 69%  HFrEF (LVEF <= 40%) Quadruple therapy recommended  HFmrEF (LVEF 41-49%) Quadruple therapy recommended  HFpEF (LVEF >= 50%) Minimum recommendations: SGLT2i and MRA  Is the patient on OP GDMT for their condition?   ARNI/ACEI/ARB: N/A   BB: N/A   MRA: N/A   SGLT2i: N/A   Is the patient prescribed a diuretic? No    Does this patient have an implanted device (ie cardioMEMS, ICD, CRT-D)?  Device type: NA  Could this patient have advanced heart failure (Stage D heart failure)?: No   If yes, the potential markers of advanced heart failure include:     REFERENCE: Potential markers of advanced HF   Inotrope (dobutamine or  "milrinone) used during this admission?   LVEF<=25%?   >=2 hospitalizations for ADHF in the last year?   Severe symptoms of HF (fatigue, dyspnea, confusion, edema) despite medical therapy?   Downtitration of GDMT as compared to home medications?   Discontinuation of GDMT because of hypotension or renal intolerance?   Recurrent arrhythmias (AF, VT with ICD shocks)?   Cardiac cachexia (i.e., unintentional weight loss due to HF)?   High-risk biomarker profile (e.g., hyponatremia [Na<135], very elevated BNP, worsening kidney function)   Escalating doses of diuretics or persistent edema despite escalation     2. Mind and Emotion  Does this patient have possible cognitive impairment?: No (The Mini-Cog score 5/5)  Ask the patient to memorize these 3 words: banana, sunrise, chair  Ask the patient to draw a clock with hands pointing at \"20 minutes after 8\"  Ask the patient to recall the 3 words  Score: Add number of words recalled + clock drawing (0 points for any errors, 2 points if correct)  Interpretation: A score of 0-2 suggests cognitive impairment is present, a score of 3-5 suggests cognitive impairment is absent  Does this patient have major depression?: Yes (PH-Q2 score 3/6)  Over the last 2 weeks: Little interest or pleasure in doing things? (Not at all +0, Several days +1, More than half the days +2, Nearly every day +3)  Over the last 2 weeks: Feeling down, depressed or hopeless? (Not at all +0, Several days +1, More than half the days +2, Nearly every day +3)  Score: Add points  Interpretation: A score of 3 or more suggests that major depression is likely.     3. Physical Function  Could this patient be frail?: No   Defined by presence of all of these: slowness, weakness, shrinking, inactivity, exhaustion  Is this patient at risk for falling?: Yes   Defined by having experienced a fall in the last 12 months.    4. Social Determinants of Health  Transportation deficits?: No   Lack of insurance?: No   Poor financial " "resources?: No   Living conditions (homelessness, unstable home)?: No   Poor family/social support?: No   Poor personal care?: No   Food insecurity?: No   Lack of HCPOA?: No    I provided the following heart failure education:  - Living With Heart Failure book provided.  - HF signs and symptoms, heart failure zones and when to call cardiologist.   - Controlling Heart Failure at Home: medication adherence, following up with cardiologist at least once yearly, staying healthy and active, limiting sodium and fluid intake as directed by cardiologist.  - Daily Weight Education    Assessment  CardioMEMS education provided.    IP Medications:  ARNi/ACEi/ARB: None  BB: None  MRA: None  SGLT2i: empagliflozin 10 mg daily  Diuretic: Furosemide 40 mg daily    Recommendations/ Plan  Recommend evaluation by , geriatrician, or psychiatrist (Patient may have depression).  Recommend healthy at home.      *Discharge Appointment Follow-up Plan:  Cardiology: Marylin Sparks MD 1/17/25 10am  [place]    Key \"Tonio\" Otto LUXN, RN  Heart Failure Clinical Nurse Navigator  [phone]           "

## 2025-01-06 NOTE — PROGRESS NOTES
[facility] Attending Shared Visit Note    This is a shared visit. Please see Advanced Practice Provider's encounter note for additional details.      Briefly, 59-year-old female from [place] with PMH of poorly controlled insulin-dependent T2DM c/b neuropathy, hyperlipidemia, PAD, CKD IV, HTN, multiple foot infections s/p partial R great toe amputation, fall w/ L femur fracture s/p ORIF (4/2024) presenting with SOB and HTN urgency. On presentation, /98, pO2 61%, . Treated with BiPAP, IV Lasix 80 mg BID, hydralazine, and isordil. Now weaned to RA, BNP down to 161, BP labile but trending lower. Renal function labile.     Echo showed EF 69%, moderate MR/TR, elevated LAP, and small pericardial effusion.     Exam: Breathing unlabored. Regular. Warm. 1+ edema    Plan:   HTN management:  - Amlodipine: 10 mg PO daily  - Hydralazine: 50 mg PO TID  - Isosorbide dinitrate: 10 mg PO TID (reduced from 20 mg due to headache)  - Spironolactone and SGLT2i: Not initiated due to current renal function (CKD IV)  - Diuretics: PO Lasix 40 mg daily (transitioned from IV Lasix 80 mg BID)  -- PRN Hydralazine: 5 mg IVP q6h for SBP >180  -- Clonidine: Discontinued during admission    Dispo: Discharge pending further HF med optimization and close outpatient follow-up.    Luis Mckee MD    Objective   Admit Date: 1/2/2025  Hospital Length of Stay: 4   Home: [place] [id number]    MEDICATIONS  Infusions:     Scheduled:  amLODIPine, 10 mg, Daily  aspirin, 81 mg, Daily  atorvastatin, 40 mg, Nightly  folic acid, 1 mg, Daily  furosemide, 40 mg, Daily  gabapentin, 300 mg, BID  heparin (porcine), 7,500 Units, q8h NEHEMIAS  hydrALAZINE, 50 mg, TID  insulin glargine, 16 Units, Daily  insulin lispro, 0-10 Units, TID AC  isosorbide dinitrate, 10 mg, TID  polyethylene glycol, 17 g, Daily  vitamin B complex-vitamin C-folic acid, 1 capsule, Daily      PRN:  acetaminophen, 650 mg, q6h PRN  albuterol, 2.5 mg, q6h PRN  dextrose, 12.5 g, q15 min  PRN  dextrose, 25 g, q15 min PRN  glucagon, 1 mg, q15 min PRN  glucagon, 1 mg, q15 min PRN  hydrALAZINE, 5 mg, q6h PRN  oxygen, , Continuous PRN - O2/gases  oxygen, , Continuous PRN - O2/gases      Prior to Admission Meds:  Medications Prior to Admission   Medication Sig Dispense Refill Last Dose/Taking    acetaminophen (Tylenol) 325 mg tablet Take 2 tablets (650 mg) by mouth every 4 hours if needed for mild pain (1 - 3).   Past Month    albuterol 90 mcg/actuation inhaler Inhale 2 puffs every 6 hours if needed for wheezing. 6.7 g 0 Past Week    amLODIPine (Norvasc) 10 mg tablet Take 1 tablet (10 mg) by mouth once daily. 90 tablet 0 1/1/2025    aspirin 81 mg chewable tablet Chew 1 tablet (81 mg) once daily. 90 tablet 0 1/1/2025 Noon    atorvastatin (Lipitor) 40 mg tablet Take 1 tablet (40 mg) by mouth once daily. 90 tablet 0 1/1/2025    b complex vitamins capsule Take 1 capsule by mouth once daily.   1/1/2025    cloNIDine (Catapres) 0.1 mg tablet Take 1 tablet (0.1 mg) by mouth 2 times a day. 180 tablet 0 1/1/2025    folic acid (Folvite) 1 mg tablet Take 1 tablet (1 mg) by mouth once daily.   1/1/2025    gabapentin (Neurontin) 300 mg capsule Take 1 capsule (300 mg) by mouth 2 times a day. 180 capsule 0 1/1/2025    insulin glargine (Basaglar KwikPen U-100 Insulin) 100 unit/mL (3 mL) pen Inject 32 Units under the skin once daily in the morning. Take as directed per insulin instructions. 15 mL 1 12/31/2024    insulin lispro (Admelog SoloStar U-100 Insulin) 100 unit/mL injection Inject per sliding scale instructions under the skin 3 times a day with meals. (Max daily dose 70 units) 15 mL 1 12/31/2024    tirzepatide (Mounjaro) 2.5 mg/0.5 mL pen injector Inject 2.5 mg under the skin every 7 days. 2 mL 0 12/30/2024    blood-glucose sensor (FreeStyle Remington 3 Sensor) device Use to monitor blood glucose. Change sensor every 14 days. 2 each 11     FreeStyle Remington 3 [place] misc Use as instructed to monitor blood glucose. 1 each  "0     furosemide (Lasix) 20 mg tablet Take 1 tablet (20 mg) by mouth once daily.       meclizine (Antivert) 25 mg tablet,chewable Chew 1 tablet (25 mg) 3 times a day as needed (vertigo). (Patient not taking: Reported on 1/2/2025) 90 tablet 1 Not Taking    OneTouch Delica Plus Lancet 30 gauge misc USE TO TEST BLOOD SUGAR AS DIRECTED 200 each 0     OneTouch Ultra Test strip Use 1 strip BID to check glucose 200 strip 1     OneTouch Ultra2 Meter misc use 1 to 2 times daily 1 each 0     pen needle, diabetic 32 gauge x 5/32\" needle Use four times a day with insulin use 360 each 1        Vitals:      1/6/2025     8:08 AM 1/6/2025     6:57 AM 1/6/2025     6:50 AM 1/6/2025     1:58 AM 1/5/2025     8:53 PM 1/5/2025     5:21 PM 1/5/2025     3:08 PM   Vitals   Systolic 107  159 119 144 127    Diastolic 61  81 61 78 78    BP Location Right arm  Left arm Left arm Left arm     Heart Rate 78  81 77 78 81    Temp 36.5 °C (97.7 °F)  36.7 °C (98.1 °F) 36.7 °C (98.1 °F) 36.6 °C (97.9 °F) 36.8 °C (98.2 °F)    Resp 18  17 17 18 18    Weight (lb)  233.6     234.3   BMI  42.73 kg/m2     42.85 kg/m2   BSA (m2)  2.15 m2     2.15 m2     Wt Readings from Last 5 Encounters:   01/06/25 106 kg (233 lb 9.6 oz)   11/19/24 99.8 kg (220 lb)   11/15/24 99.8 kg (220 lb 0.3 oz)   09/05/24 99.8 kg (220 lb)   09/04/24 102 kg (224 lb)       Intake/Output Summary (Last 24 hours) at 1/6/2025 0812  Last data filed at 1/6/2025 0657  Gross per 24 hour   Intake 840 ml   Output 575 ml   Net 265 ml       CHEST: Unlabored, Clear, Diminished  CV:  Normal sinus rhythm  NEURO:   RASS:    CAM:    LOC: Alert  Cognition: Appropriate judgement  GCS: 15    DATA:  CMP:  Recent Labs     01/05/25 2008 01/04/25  2119 01/04/25  0700 01/03/25 2009 01/02/25  1835 01/02/25  1253 09/06/24  0120 04/24/24  1132 04/23/24  0803 04/19/24  0636 04/17/24  0901 04/15/24  2128 02/10/24  0519   * 141 142 142 144 142 143 139 138 135* 134*   < > 142   K 4.6 4.2 4.1 4.2 4.6 4.5 4.1 5.0 " 5.0 4.9 4.5   < > 4.1    104 108* 109* 109* 111* 108* 106 105 103 102   < > 107   CO2 24 27 27 26 22 23 27 24 23 22 23   < > 27   ANIONGAP 14 14 11 11 18 13 12 14 15 15 14   < > 12   BUN 46* 41* 36* 35* 29* 29* 29* 39* 36* 37* 31*   < > 18   CREATININE 2.93* 2.74* 2.55* 2.49* 2.31* 2.34* 1.68* 1.93* 1.64* 2.20* 2.03*   < > 1.90*   EGFR 18* 19* 21* 22* 24* 23* 35* 30* 36* 25* 28*   < > 30*   MG 2.50* 2.04  --  2.06 2.50*  --   --   --  2.55* 2.49* 2.08  --  2.20    < > = values in this interval not displayed.     Recent Labs     01/05/25 2008 01/04/25  0700 01/03/25 2009 01/02/25  1835 01/02/25  1253 09/06/24  0120 04/24/24  1132 04/23/24  0803 04/17/24  0901 04/15/24  2128 02/07/24  0422 02/06/24  0735 02/04/24  0555 02/03/24  0122 01/26/24  1343 01/26/24  0358 01/25/24  1457 01/24/24  2032 01/23/24  1230 01/12/24  0603 01/11/24  0747 01/09/24  1210 08/13/23  1712 07/04/19  0707 04/02/19  1106 02/17/19  1207   ALBUMIN 3.1* 2.9* 3.1* 3.9 3.6 3.3* 2.6* 2.7*   < > 3.5   < > 2.9*   < > 3.4   < > 2.7*   < > 3.4 4.1   < > 2.5*   < > 3.6   < > 3.9 3.8  3.8   ALT  --   --   --   --  12 10  --   --   --  23  --   --   --  22  --  19  --  25 16  --  6   < > 11   < > 12 10   AST  --   --   --   --  14 11  --   --   --  22  --   --   --  21  --  15  --  28 17  --  8   < > 11   < > 13 12   BILITOT  --   --   --   --  0.4 0.4  --   --   --  0.4  --   --   --  0.4  --  0.2  --  0.4 0.3  --  <0.2   < > 0.4   < > 0.5 0.4   LIPASE  --   --   --   --   --   --   --   --   --   --   --  5*  --   --   --   --   --   --   --   --   --   --  <3*  --  7* 3*    < > = values in this interval not displayed.     CBC:  Recent Labs     01/05/25 2008 01/04/25  2119 01/03/25 2009 01/02/25  1835 01/02/25  1253 09/06/24  0120 04/24/24  1132 04/23/24  0803   WBC 6.5 6.9 5.3 5.9 5.4 7.4 9.7 8.8   HGB 9.3* 9.8* 9.4* 11.1* 11.1* 10.3* 6.2* 6.5*   HCT 27.9* 31.6* 30.0* 33.3* 33.1* 31.9* 19.9* 19.4*    329 285 369 363 318 469* 418   MCV  89 93 95 90 90 88 95 87     COAG:   Recent Labs     04/15/24  2128 01/29/24  1217 01/24/24  2032 10/21/19  1709 04/02/19  1106 02/17/19  1207   INR 0.9 1.1 1.2* 1.0 1.0 1.1     ABO:   Recent Labs     04/17/24  0901   ABO A     HEME/ENDO:   Recent Labs     01/02/25  1835 11/19/24  1406 07/19/24  0852 04/16/24  0637 02/03/24  0122 01/26/24  0358 01/24/24 2032 01/18/24  0525 01/15/24  0606 09/14/23  1047 06/14/23  1016 05/08/23  1320 01/27/23  0840 01/26/23  1613   FERRITIN  --   --   --   --   --   --   --  276* 487*  --   --   --   --   --    IRONSAT  --   --   --   --   --   --   --  34 32  --  25  --   --   --    TSH  --   --   --   --  0.46 1.37  --   --   --   --  0.83  --   --  0.27*   FREET4  --   --   --   --   --   --   --   --   --   --   --   --  1.16 1.14   HGBA1C 8.7* 9* 11.2* 11.9*  --   --    < >  --   --    < >  --    < >  --   --     < > = values in this interval not displayed.     CARDIAC:   Recent Labs     01/04/25 2119 01/02/25  1253 02/03/24  0122 01/24/24 2032 01/25/23  1658 01/25/23  1616   TROPHS  --  21 12 25 7 8   * 376* 185* 311* 24  --      Recent Labs     05/08/23  0935 04/12/21  0906 10/20/20  1236   CHOL 174 161 181   LDLF 98 101* 115*   HDL 38.5* 38.9* 41.5   TRIG 189* 104 121     TOX:  Recent Labs     02/03/24  0800   AMPHETAMINE Presumptive Negative   BENZO Presumptive Negative   CANNABINOID Presumptive Negative   COCAI Presumptive Negative   FENTANYL Presumptive Negative   OPIATE Presumptive Negative   OXYCODONE Presumptive Negative   PCP Presumptive Negative     MICRO:   Recent Labs     02/05/24  0446 02/03/24  0953 02/03/24  0122 01/29/24  1217 01/25/24  0819 01/23/24  1230   CRP 1.29*  --  1.86*  --   --  0.21   PROCAL  --  0.05  --  0.05 0.14*  --      No results found for the last 90 days.      EKG:   Recent Labs     01/02/25  1225 09/06/24  0337 04/15/24  2155   ATRRATE 88 81 77   VENTRATE 88 81 77   PRINT 128 120 122   QRSDUR 82 86 86   QTCFRED 416 424 382   QTCCALCB  442 446 398     Encounter Date: 01/02/25   ECG 12 lead   Result Value    Ventricular Rate 88    Atrial Rate 88    MT Interval 128    QRS Duration 82    QT Interval 366    QTC Calculation(Bazett) 442    P Axis 58    R Axis 134    T Axis -1    QRS Count 14    Q Onset 221    P Onset 157    P Offset 214    T Offset 404    QTC Fredericia 416    Narrative    Normal sinus rhythm  Left posterior fascicular block  Cannot rule out Anterior infarct (cited on or before 15-ISAK-2024)  Abnormal ECG  When compared with ECG of 06-SEP-2024 03:33,  Left posterior fascicular block is now Present  Inverted T waves have replaced nonspecific T wave abnormality in Inferior leads    See ED provider note for full interpretation and clinical correlation  Confirmed by Kaye Jay ([id number]) on 1/3/2025 12:44:14 AM     Echocardiogram:   Recent Labs     01/03/25  1010 01/25/24  1100   EF 69  --    LVIDD 4.41 4.35   RV 45.2 32.4   RVFRWALLPKSP 10.00 9.57   TAPSE 2.5 2.0   Transthoracic Echo (TTE) Complete With Contrast 01/03/2025  Left Ventricle: Left ventricular ejection fraction is normal, calculated by Argueta's biplane at 69%. There are no regional left ventricular wall motion abnormalities. The left ventricular cavity size is normal. There is mildly increased septal and mildly increased posterior left ventricular wall thickness. There is left ventricular concentric remodeling. Spectral Doppler shows a Grade II (pseudonormal pattern) of left ventricular diastolic filling with an elevated left atrial pressure.  Left Atrium: The left atrium is moderately dilated.  Right Ventricle: The right ventricle is normal in size. There is normal right ventricular global systolic function.  Right Atrium: The right atrium is mildly dilated.  Aortic Valve: The aortic valve is trileaflet. There is minimal aortic valve cusp calcification. There is no evidence of aortic valve regurgitation. The peak instantaneous gradient of the aortic valve is 11 mmHg.  Mitral  Valve: The mitral valve is normal in structure. There is moderate mitral valve regurgitation which is posteriorly directed. The mitral regurgitant orifice area is 31 mm2. The mitral regurgitant volume is 55.50 ml. Mechanism of MR Is possibly restricted motion of the posterior leaflet.  Tricuspid Valve: The tricuspid valve is structurally normal. There is moderate tricuspid regurgitation. The Doppler estimated RVSP is moderately elevated at 45.2 mmHg.  Pulmonic Valve: The pulmonic valve is structurally normal. There is physiologic pulmonic valve regurgitation.  Pericardium: Small pericardial effusion. The effusion is circumferential.  Pleural: There is a small left pleural effusion.  Aorta: The aortic root is normal. The Ao Sinus is 2.90 cm. The Asc Ao is 3.30 cm.  Systemic Veins: The inferior vena cava appears normal in size, with IVC inspiratory collapse greater than 50%.  In comparison to the previous echocardiogram(s): Compared with study dated 1/25/2024, the degree of MR appears moderate today rather than mild on prior study. TR is also worsened.      CONCLUSIONS:  1. Left ventricular ejection fraction is normal, calculated by Argueta's biplane at 69%.  2. Spectral Doppler shows a Grade II (pseudonormal pattern) of left ventricular diastolic filling with an elevated left atrial pressure.  3. There is normal right ventricular global systolic function.  4. The left atrium is moderately dilated.  5. Moderate mitral valve regurgitation.  6. Mechanism of MR Is possibly restricted motion of the posterior leaflet.  7. Moderate tricuspid regurgitation visualized.  8. Moderately elevated right ventricular systolic pressure.  9. Small pericardial effusion.  10. Compared with study dated 1/25/2024, the degree of MR appears moderate today rather than mild on prior study. TR is also worsened.    QUANTITATIVE DATA SUMMARY:    2D MEASUREMENTS:          Normal Ranges:  LAs:             3.32 cm  (2.7-4.0cm)  RVIDd:            2.76 cm  (0.9-3.6cm)  IVSd:            1.10 cm  (0.6-1.1cm)  LVPWd:           0.96 cm  (0.6-1.1cm)  LVIDd:           4.41 cm  (3.9-5.9cm)  LVIDs:           2.83 cm  LV Mass Index:   76 g/m2  LVEDV Index:     44 ml/m2  LV % FS          35.8 %      LA VOLUME:                    Normal Ranges:  LA Vol A4C:        88.4 ml    (22+/-6mL/m2)  LA Vol A2C:        79.6 ml  LA Vol BP:         85.3 ml  LA Vol Index A4C:  43.1ml/m2  LA Vol Index A2C:  38.8 ml/m2  LA Vol Index BP:   41.6 ml/m2  LA Area A4C:       25.6 cm2  LA Area A2C:       23.9 cm2  LA Major Axis A4C: 6.3 cm  LA Major Axis A2C: 6.1 cm  LA Volume Index:   35.2 ml/m2      RA VOLUME BY A/L METHOD:            Normal Ranges:  RA Vol A4C:              73.5 ml    (8.3-19.5ml)  RA Vol Index A4C:        35.8 ml/m2  RA Area A4C:             21.0 cm2  RA Major Axis A4C:       5.1 cm      AORTA MEASUREMENTS:         Normal Ranges:  Ao Sinus, d:        2.90 cm (2.1-3.5cm)  Asc Ao, d:          3.30 cm (2.1-3.4cm)      LV SYSTOLIC FUNCTION BY 2D PLANIMETRY (MOD):  Normal Ranges:  EF-A4C View:    75 % (>=55%)  EF-A2C View:    66 %  EF-Biplane:     69 %  LV EF Reported: 69 %      LV DIASTOLIC FUNCTION:             Normal Ranges:  MV Peak E:             1.19 m/s    (0.7-1.2 m/s)  MV Peak A:             1.00 m/s    (0.42-0.7 m/s)  E/A Ratio:             1.19        (1.0-2.2)  MV e'                  0.040 m/s   (>8.0)  MV lateral e'          0.05 m/s  MV medial e'           0.03 m/s  MV A Dur:              183.39 msec  E/e' Ratio:            29.68       (<8.0)  PulmV Sys Molina:         49.18 cm/s  PulmV Robles Molina:        57.26 cm/s  PulmV S/D Molina:         0.86  PulmV A Revs Molina:      21.81 cm/s  PulmV A Revs Dur:      114.19 msec      MITRAL VALVE:          Normal Ranges:  MV DT:        232 msec (150-240msec)      MITRAL INSUFFICIENCY:             Normal Ranges:  MR VTI:               180.58 cm  MR Vmax:              523.70 cm/s  MR Volume:            55.50 ml  MR Flow Rt:            160.96 ml/s  MR EROA:              31 mm2      AORTIC VALVE:            Normal Ranges:  AoV Vmax:      1.68 m/s  (<=1.7m/s)  AoV Peak P.3 mmHg (<20mmHg)  LVOT Max Molina:  1.33 m/s  (<=1.1m/s)  LVOT VTI:      26.11 cm  LVOT Diameter: 1.79 cm   (1.8-2.4cm)  AoV Area,Vmax: 2.00 cm2  (2.5-4.5cm2)      RIGHT VENTRICLE:  RV Basal 3.50 cm  RV Mid   2.80 cm  RV Major 7.1 cm  TAPSE:   25.0 mm  RV s'    0.10 m/s      TRICUSPID VALVE/RVSP:          Normal Ranges:  Peak TR Velocity:     3.25 m/s  Est. RA Pressure:     3 mmHg  RV Syst Pressure:     45 mmHg  (< 30mmHg)  IVC Diam:             1.90 cm      PULMONIC VALVE:          Normal Ranges:  PV Accel Time:  131 msec (>120ms)  PV Max Molina:     1.0 m/s  (0.6-0.9m/s)  PV Max PG:      3.8 mmHg      Pulmonary Veins:  PulmV A Revs Dur: 114.19 msec  PulmV A Revs Molina: 21.81 cm/s  PulmV Robles Molina:   57.26 cm/s  PulmV S/D Molina:    0.86  PulmV Sys Molina:    49.18 cm/s      AORTA:  Asc Ao Diam 3.29 cm      [id number] Ninfa Pastor MD  Electronically signed on 1/3/2025 at 12:11:49 PM        ** Final **    Coronary Angiography: No results found for this or any previous visit from the past 1800 days.    Right Heart Cath: No results found for this or any previous visit from the past 1800 days.    Cardiac Scoring: No results found for this or any previous visit from the past 1800 days.    Cardiac MRI: No results found for this or any previous visit from the past 1800 days.    Nuclear:No results found for this or any previous visit from the past 1800 days.    Metabolic Stress: No results found for this or any previous visit from the past 1800 days.      Transthoracic Echo (TTE) Complete    Result Date: 1/3/2025   [facility], [address] [place] [address]                Tel [phone] and Fax [phone] TRANSTHORACIC ECHOCARDIOGRAM REPORT  Patient Name:       ALBERT Mullins Physician:    [id number] Ninfa                                                                [place] BE Study Date:         1/3/2025            Ordering Provider:    [id number] KAR WORLEY                                                               ROBERYADIRAJASVIR MRN/PID:            [id number]            Fellow:               [id number] Abby Hess MD Accession#:         [id number]        Nurse: Date of Birth/Age:  1965 / 59      Sonographer:          Cassie Chopra RCS                     years Gender assigned at  F                   Additional Staff: Birth: Height:             157.48 cm           Admit Date: Weight:             107.05 kg           Admission Status:     Inpatient -                                                               Routine BSA / BMI:          2.05 m2 / 43.16     Encounter#:           [id number]                     kg/m2 Blood Pressure:     147/83 mmHg         Department Location:  [facility]                                                               Non Invasive Study Type:    TRANSTHORACIC ECHO (TTE) COMPLETE Diagnosis/ICD: Acute on chronic diastolic (congestive) heart failure                (CHF)-I50.33 Indication:    ADHF CPT Code:      Echo Complete w Full [id number] Patient History: Diabetes:          Yes Pertinent History: Hyperlipidemia and PVD. ADHF, PAD. Study Detail: The following Echo studies were performed: 2D, M-Mode, Doppler and               color flow. Technically challenging study due to body habitus.               Definity used as a contrast agent for endocardial border               definition. Total contrast used for this procedure was 2 mL via IV               push.  PHYSICIAN INTERPRETATION: Left Ventricle: Left ventricular ejection fraction is normal, calculated by Argueta's biplane at 69%. There are no regional left ventricular wall motion abnormalities. The left ventricular cavity size is normal. There is mildly increased septal and mildly increased posterior left ventricular wall  thickness. There is left ventricular concentric remodeling. Spectral Doppler shows a Grade II (pseudonormal pattern) of left ventricular diastolic filling with an elevated left atrial pressure. Left Atrium: The left atrium is moderately dilated. Right Ventricle: The right ventricle is normal in size. There is normal right ventricular global systolic function. Right Atrium: The right atrium is mildly dilated. Aortic Valve: The aortic valve is trileaflet. There is minimal aortic valve cusp calcification. There is no evidence of aortic valve regurgitation. The peak instantaneous gradient of the aortic valve is 11 mmHg. Mitral Valve: The mitral valve is normal in structure. There is moderate mitral valve regurgitation which is posteriorly directed. The mitral regurgitant orifice area is 31 mm2. The mitral regurgitant volume is 55.50 ml. Mechanism of MR Is possibly restricted motion of the posterior leaflet. Tricuspid Valve: The tricuspid valve is structurally normal. There is moderate tricuspid regurgitation. The Doppler estimated RVSP is moderately elevated at 45.2 mmHg. Pulmonic Valve: The pulmonic valve is structurally normal. There is physiologic pulmonic valve regurgitation. Pericardium: Small pericardial effusion. The effusion is circumferential. Pleural: There is a small left pleural effusion. Aorta: The aortic root is normal. The Ao Sinus is 2.90 cm. The Asc Ao is 3.30 cm. Systemic Veins: The inferior vena cava appears normal in size, with IVC inspiratory collapse greater than 50%. In comparison to the previous echocardiogram(s): Compared with study dated 1/25/2024, the degree of MR appears moderate today rather than mild on prior study. TR is also worsened.  CONCLUSIONS:  1. Left ventricular ejection fraction is normal, calculated by Argueta's biplane at 69%.  2. Spectral Doppler shows a Grade II (pseudonormal pattern) of left ventricular diastolic filling with an elevated left atrial pressure.  3. There is  normal right ventricular global systolic function.  4. The left atrium is moderately dilated.  5. Moderate mitral valve regurgitation.  6. Mechanism of MR Is possibly restricted motion of the posterior leaflet.  7. Moderate tricuspid regurgitation visualized.  8. Moderately elevated right ventricular systolic pressure.  9. Small pericardial effusion. 10. Compared with study dated 1/25/2024, the degree of MR appears moderate today rather than mild on prior study. TR is also worsened. QUANTITATIVE DATA SUMMARY:  2D MEASUREMENTS:          Normal Ranges: LAs:             3.32 cm  (2.7-4.0cm) RVIDd:           2.76 cm  (0.9-3.6cm) IVSd:            1.10 cm  (0.6-1.1cm) LVPWd:           0.96 cm  (0.6-1.1cm) LVIDd:           4.41 cm  (3.9-5.9cm) LVIDs:           2.83 cm LV Mass Index:   76 g/m2 LVEDV Index:     44 ml/m2 LV % FS          35.8 %  LA VOLUME:                    Normal Ranges: LA Vol A4C:        88.4 ml    (22+/-6mL/m2) LA Vol A2C:        79.6 ml LA Vol BP:         85.3 ml LA Vol Index A4C:  43.1ml/m2 LA Vol Index A2C:  38.8 ml/m2 LA Vol Index BP:   41.6 ml/m2 LA Area A4C:       25.6 cm2 LA Area A2C:       23.9 cm2 LA Major Axis A4C: 6.3 cm LA Major Axis A2C: 6.1 cm LA Volume Index:   35.2 ml/m2  RA VOLUME BY A/L METHOD:            Normal Ranges: RA Vol A4C:              73.5 ml    (8.3-19.5ml) RA Vol Index A4C:        35.8 ml/m2 RA Area A4C:             21.0 cm2 RA Major Axis A4C:       5.1 cm  AORTA MEASUREMENTS:         Normal Ranges: Ao Sinus, d:        2.90 cm (2.1-3.5cm) Asc Ao, d:          3.30 cm (2.1-3.4cm)  LV SYSTOLIC FUNCTION BY 2D PLANIMETRY (MOD):                      Normal Ranges: EF-A4C View:    75 % (>=55%) EF-A2C View:    66 % EF-Biplane:     69 % LV EF Reported: 69 %  LV DIASTOLIC FUNCTION:             Normal Ranges: MV Peak E:             1.19 m/s    (0.7-1.2 m/s) MV Peak A:             1.00 m/s    (0.42-0.7 m/s) E/A Ratio:             1.19        (1.0-2.2) MV e'                  0.040 m/s    (>8.0) MV lateral e'          0.05 m/s MV medial e'           0.03 m/s MV A Dur:              183.39 msec E/e' Ratio:            29.68       (<8.0) PulmV Sys Molina:         49.18 cm/s PulmV Robles Molina:        57.26 cm/s PulmV S/D Molina:         0.86 PulmV A Revs Molina:      21.81 cm/s PulmV A Revs Dur:      114.19 msec  MITRAL VALVE:          Normal Ranges: MV DT:        232 msec (150-240msec)  MITRAL INSUFFICIENCY:             Normal Ranges: MR VTI:               180.58 cm MR Vmax:              523.70 cm/s MR Volume:            55.50 ml MR Flow Rt:           160.96 ml/s MR EROA:              31 mm2  AORTIC VALVE:            Normal Ranges: AoV Vmax:      1.68 m/s  (<=1.7m/s) AoV Peak P.3 mmHg (<20mmHg) LVOT Max Molina:  1.33 m/s  (<=1.1m/s) LVOT VTI:      26.11 cm LVOT Diameter: 1.79 cm   (1.8-2.4cm) AoV Area,Vmax: 2.00 cm2  (2.5-4.5cm2)  RIGHT VENTRICLE: RV Basal 3.50 cm RV Mid   2.80 cm RV Major 7.1 cm TAPSE:   25.0 mm RV s'    0.10 m/s  TRICUSPID VALVE/RVSP:          Normal Ranges: Peak TR Velocity:     3.25 m/s Est. RA Pressure:     3 mmHg RV Syst Pressure:     45 mmHg  (< 30mmHg) IVC Diam:             1.90 cm  PULMONIC VALVE:          Normal Ranges: PV Accel Time:  131 msec (>120ms) PV Max Molina:     1.0 m/s  (0.6-0.9m/s) PV Max PG:      3.8 mmHg  Pulmonary Veins: PulmV A Revs Dur: 114.19 msec PulmV A Revs Molina: 21.81 cm/s PulmV Robles Molina:   57.26 cm/s PulmV S/D Molina:    0.86 PulmV Sys Molina:    49.18 cm/s  AORTA: Asc Ao Diam 3.29 cm  [id number] Ninaf Pastor MD Electronically signed on 1/3/2025 at 12:11:49 PM  ** Final **     ECG 12 lead    Result Date: 1/3/2025  Normal sinus rhythm Left posterior fascicular block Cannot rule out Anterior infarct (cited on or before 15-ISAK-2024) Abnormal ECG When compared with ECG of 06-SEP-2024 03:33, Left posterior fascicular block is now Present Inverted T waves have replaced nonspecific T wave abnormality in Inferior leads See ED provider note for full interpretation and clinical  correlation Confirmed by Kaye Jay ([id number]) on 1/3/2025 12:44:14 AM    XR chest 1 view    Result Date: 1/2/2025  STUDY: Chest Radiograph;  1/2/2025 12:59PM INDICATION: Shortness of breath. COMPARISON: 9/6/2024 XR Chest. ACCESSION NUMBER(S): [id number] ORDERING CLINICIAN: RENETTA PALMA TECHNIQUE:  Frontal chest was obtained at 1258 hours. FINDINGS: Cardiac silhouette remains enlarged. There is pulmonary vascular congestion bilaterally. Small bibasal pleural effusions with associated atelectasis. No visible pneumothorax.     Constellation of findings is suggestive of congestive heart failure. Signed by Rupesh Borges MD    Point of Care Ultrasound    Result Date: 1/2/2025  Valdo Whatley DO     1/4/2025 11:13 AM Performed by: Valdo Whatley DO Authorized by: Valdo Whatley DO  Respiratory Indications: shortness of breath, tachypnea and hypoxia Procedure: Cardiac Ultrasound Findings:  Views: parasternal long, parasternal short and apical four Effusion: The pericardial space was visualized and was positive for a PERICARDIAL EFFUSION. Activity: Ventricular contractions were visualized. LV: LV systolic function was NORMAL. RV: RV size was NORMAL. Impression: Cardiac: The focused cardiac ultrasound exam had ABNORMAL findings as specified.     BI US breast limited left    Result Date: 12/26/2024  Interpreted By:  Jax Hagan, STUDY: BI US BREAST LIMITED LEFT;  12/26/2024 11:30 am   ACCESSION NUMBER(S): [id number]   ORDERING CLINICIAN: ROGER URBINA   INDICATION: Imaging follow-up to document resolution of a probable hematoma.   ,R92.8 Other abnormal and inconclusive findings on diagnostic imaging of breast   COMPARISON: 11/25/2020   FINDINGS: Targeted ultrasound was performed of the left breast by a registered sonographer with elastography.   The patient was initially recalled from a screening mammogram for the left breast finding.   Sonographic images were obtained of the left breast 23 cm from the nipple. The  previously described finding has since resolved which is consistent with the suspected diagnosis of a hematoma. No new suspicious findings are seen. Elastography was also performed which demonstrates no areas of increased stiffness.       Interval resolution of the previously described finding. Patient can return to screening.   BI-RADS CATEGORY: BI-RADS Category:  1 Negative. Recommendation:  Annual Screening. Recommended Date:  1 Year. Laterality:  Bilateral.   For any future breast imaging appointments, please call [phone]).     MACRO: None   Signed by: Jax Hagan 12/26/2024 11:36 AM Dictation workstation:   [id number]       LDA:   NUTRITION: Adult diet Consistent Carb, 2-3 grams sodium; CCD 60 gm/meal; 2000 mL fluid  EMERGENCY CONTACT: Extended Emergency Contact Information  Primary Emergency Contact: Brandon Rousseau  Mobile Phone: [phone]  Relation: Significant Other  Secondary Emergency Contact: KatharineKimberly figueroa  Mobile Phone: [phone]  Relation: Daughter   needed? No  CODE STATUS: Full Code  DISPO: Discharge Planning  Living Arrangements: Spouse/significant other  Support Systems: Spouse/significant other  Assistance Needed: None  Type of Residence: Private residence  Number of Stairs to Enter Residence: 5  Number of Stairs Within Residence: 0  Do you have animals or pets at home?: Yes  Type of Animals or Pets: dog  Who is requesting discharge planning?: Provider  Home or Post Acute Services: None  Type of Home Care Services: Home nursing visits  Expected Discharge Disposition: Home  Does the patient need discharge transport arranged?: No  AMPAC: Daily Activity - Total Score: 21    FOLLOWUP:   Future Appointments   Date Time Provider Department Center   1/14/2025  2:20 PM Amanda Joe, PharmD [id number] [facility]   1/20/2025 11:30 AM Marcin Motta MD [id number] [facility]   2/5/2025  9:45 AM Barrera Rodriguez MD [id number] [facility]   2/20/2025 10:30 AM Mone Aquino MD  [id number] [facility]

## 2025-01-06 NOTE — PROGRESS NOTES
Subjective Data:   Reports chest tightness, which has improved since admission. Denies CP/SOB at rest, family at bedside.    - start empa 10mg daily  - PO lasix 40mg BID  - plan for early discharge tomorrow pending improvement in Cr    Overnight Events:  No acute events overnight.     Objective Data:  Last Recorded Vitals:  Vitals:    01/06/25 0650 01/06/25 0657 01/06/25 0808 01/06/25 0811   BP: 159/81  107/61    BP Location: Left arm  Right arm    Patient Position: Lying  Lying    Pulse: 81  78    Resp: 17  18    Temp: 36.7 °C (98.1 °F)  36.5 °C (97.7 °F)    TempSrc: Temporal  Temporal    SpO2: 92%  (!) 83% 93%   Weight:  106 kg (233 lb 9.6 oz)     Height:         Last Labs:  CBC - 1/5/2025:  8:08 PM  6.5 9.3 298    27.9      CMP - 1/5/2025:  8:08 PM  8.4 6.9 14 --- 0.4   5.1 3.1 12 114      PTT - 4/15/2024:  9:28 PM  0.9   10.3 31     TROPHS   Date/Time Value Ref Range Status   01/02/2025 12:53 PM 21 0 - 34 ng/L Final   02/03/2024 01:22 AM 12 0 - 34 ng/L Final   01/24/2024 08:32 PM 25 0 - 34 ng/L Final   01/25/2023 04:58 PM 7 0 - 34 ng/L Final     Comment:     .  Less than 99th percentile of normal range cutoff-  Female and children under 18 years old <35 ng/L; Male <54 ng/L: Negative  Repeat testing should be performed if clinically indicated.   .  Female and children under 18 years old  ng/L; Male  ng/L:  Consistent with possible cardiac damage and possible increased clinical   risk. Serial measurements may help to assess extent of myocardial damage.   .  >120 ng/L: Consistent with cardiac damage, increased clinical risk and  myocardial infarction. Serial measurements may help assess extent of   myocardial damage.   .   NOTE: Children less than 1 year old may have higher baseline troponin   levels and results should be interpreted in conjunction with the overall   clinical context.  .  NOTE: Troponin I testing is performed using a different   testing methodology at [facility] than at  [facility] hospitals. Direct result comparisons should only   be made within the same method.     01/25/2023 04:16 PM 8 0 - 34 ng/L Final     Comment:     .  Less than 99th percentile of normal range cutoff-  Female and children under 18 years old <35 ng/L; Male <54 ng/L: Negative  Repeat testing should be performed if clinically indicated.   .  Female and children under 18 years old  ng/L; Male  ng/L:  Consistent with possible cardiac damage and possible increased clinical   risk. Serial measurements may help to assess extent of myocardial damage.   .  >120 ng/L: Consistent with cardiac damage, increased clinical risk and  myocardial infarction. Serial measurements may help assess extent of   myocardial damage.   .   NOTE: Children less than 1 year old may have higher baseline troponin   levels and results should be interpreted in conjunction with the overall   clinical context.  .  NOTE: Troponin I testing is performed using a different   testing methodology at [facility] than at [facility]. Direct result comparisons should only   be made within the same method.       BNP   Date/Time Value Ref Range Status   01/04/2025 09:19  0 - 99 pg/mL Final   01/02/2025 12:53  0 - 99 pg/mL Final     HGBA1C   Date/Time Value Ref Range Status   01/02/2025 06:35 PM 8.7 See comment % Final   11/19/2024 02:06 PM 9 4.2 - 6.5 % Final   07/19/2024 08:52 AM 11.2 4.2 - 6.5 % Final     VLDL   Date/Time Value Ref Range Status   05/08/2023 09:35 AM 38 0 - 40 mg/dL Final   04/12/2021 09:06 AM 21 0 - 40 mg/dL Final   10/20/2020 12:36 PM 24 0 - 40 mg/dL Final      Last I/O:  I/O last 3 completed shifts:  In: 960 (9.1 mL/kg) [P.O.:960]  Out: 2075 (19.6 mL/kg) [Urine:2075 (0.5 mL/kg/hr)]  Weight: 106 kg     Past Cardiology Tests (Last 3 Years):    Echo:  Transthoracic Echo (TTE) Complete 01/25/2024     PHYSICIAN INTERPRETATION:  Left Ventricle: The left ventricular systolic function is  hyperdynamic, with an estimated ejection fraction of 70-75%. There are no regional wall motion abnormalities. The left ventricular cavity size is normal. The left ventricular septal wall thickness is mildly increased. There is mild concentric left ventricular hypertrophy. Spectral Doppler shows a normal pattern of left ventricular diastolic filling.  Left Atrium: The left atrium is mildly dilated.  Right Ventricle: The right ventricle was not well visualized. There is normal right ventricular global systolic function.  Right Atrium: The right atrium is normal in size.  Aortic Valve: The aortic valve is trileaflet. There is no evidence of aortic valve regurgitation. The peak instantaneous gradient of the aortic valve is 10.0 mmHg. The mean gradient of the aortic valve is 5.0 mmHg.  Mitral Valve: The mitral valve is normal in structure. There is trace to mild mitral valve regurgitation.  Tricuspid Valve: The tricuspid valve is structurally normal. There is trace to mild tricuspid regurgitation. The Doppler estimated RVSP is within normal limits at 32.4 mmHg.  Pulmonic Valve: The pulmonic valve is structurally normal. There is physiologic pulmonic valve regurgitation.  Pericardium: There is a trivial pericardial effusion.  Pleural: There is left pleural effusion.  Aorta: The aortic root is normal.    CONCLUSIONS:  1. Left ventricular systolic function is hyperdynamic with a 70-75% estimated ejection fraction.  2. RVSP within normal limits.    ** Final **    Transthoracic Echo (TTE) Complete 01/3/2025  CONCLUSIONS:   1. Left ventricular ejection fraction is normal, calculated by Argueta's biplane at 69%.   2. Spectral Doppler shows a Grade II (pseudonormal pattern) of left ventricular diastolic filling with an elevated left atrial pressure.   3. There is normal right ventricular global systolic function.   4. The left atrium is moderately dilated.   5. Moderate mitral valve regurgitation.   6. Mechanism of MR Is  possibly restricted motion of the posterior leaflet.   7. Moderate tricuspid regurgitation visualized.   8. Moderately elevated right ventricular systolic pressure.   9. Small pericardial effusion.  10. Compared with study dated 1/25/2024, the degree of MR appears moderate today rather than mild on prior study. TR is also worsened.    Inpatient Medications:  Scheduled medications   Medication Dose Route Frequency    amLODIPine  10 mg oral Daily    aspirin  81 mg oral Daily    atorvastatin  40 mg oral Nightly    folic acid  1 mg oral Daily    furosemide  40 mg oral Daily    gabapentin  300 mg oral BID    heparin (porcine)  7,500 Units subcutaneous q8h NEHEMIAS    hydrALAZINE  50 mg oral TID    insulin glargine  16 Units subcutaneous Daily    insulin lispro  0-10 Units subcutaneous TID AC    isosorbide dinitrate  10 mg oral TID    polyethylene glycol  17 g oral Daily    vitamin B complex-vitamin C-folic acid  1 capsule oral Daily     PRN medications   Medication    acetaminophen    albuterol    dextrose    dextrose    glucagon    glucagon    hydrALAZINE    oxygen    oxygen     Continuous Medications   Medication Dose Last Rate     Physical Exam:  General: NAD, alert, uncomfortable appearing (reported headache)  Skin: warm and dry throughout, no abrasions or ecchymosis  Head/ neck: unable to appreciate JVD due to body positioning  Cardiac: RRR, no murmurs or rubs  Pulm: diminished bases, on room air   GI: soft, nontender, non-distended, +BS  Extremities: trace BL LE edema, warm throughout   Neuro: no focal neuro deficits, A&Ox3  Psych: appropriate mood and behavior, pleasant       Assessment/Plan   Nuris Squires is a 59 y.o. female presenting with PMH of poorly controlled insulin dependent type 2 diabetes c/b neuropathy, hyperlipidemia, PAD, CKD stage IV, hypertension, multiple foot infections s/p partial R great toe amputation, fall w/L femur fracture s/p ORIF (4/2024) that presents to the emergency department today for  shortness of breath x1 day. Admitted under HHVI Service for further management of acute ADHF and HTN Urgency.     Acute diastolic heart failure  Chest tightness, resolved  - likely 2/2 uncontrolled HTN  - TTE (1/2024): EF 70-75%  - TTE 1/3/2025: EF 69%, grade II diastolic filling, moderate MR, moderate TR, small pericardial effusion  - neg stress 10/2019  - CXR: pulm vascular congestion BL, small bibasilar pleural effusions  -  (ISO obesity) -> repeat  yesterday  - HS trop 21, no ischemic changes on ECG, no c/f ACS  - dry weight ~100kg  - admit weight: 107kg  - daily weight: pending (106 kg)  - of note, pharmacy verified patient on PO lasix 20mg daily at home (?for unclear reasons, presumably prescribed by PCP for leg swelling)-> per patient, was just started on it at home  - s/p diuresis w/IVP Lasix boluses--->transition to PO lasix 40mg BID iso Cr bump and downtrending BNP  - start empa 10mg daily   - continue hydralazine 50 mg TID,  isordil 10 mg TID (tolerating well)  - Unable to add robyn/ SGLTII due to current renal function  - Daily standing weights, strict I&O's, 2g sodium diet, 2L fluid restriction      Acute hypoxic respiratory failure iso of ADHF exacerbation, improving  - p.ox initially at 61% but improved rapidly with placement of nasal cannula  - She was requiring 6 L of oxygen, BiPAP briefly due to the concern for SCAPE, received SL nitro and she was able to wean off of BiPAP  - Flu A/B/RSV/COVID negative  - diuresis plan as above  - weaned to RA (1/5)     HTN urgency, improving  - BP on on admit: 220/98  - SBP improved to 143 after resumption of home meds and s/p IVP Hydral 5mg x1  - last 24 hour SBP range: 119-146  - BPs labile, clonidine weaned off on 1/5, intermittent headaches w/isordil (tolerating 10mg TID)  - cont. BP meds as above, home amlodipine 10mg daily     CKD IV, worsening  - Cr last year 1.7-5.3, worsening since 2022  - admit Cr 2.34  - daily Cr 2.93 (2.74, 2.55, 2.49,  2.31)  - likely cardiorenal 2/2 ADHF  - diuresis plan as above    Insulin dependent Type 2 diabetes  Poorly controlled  - Follow with clinical pharmacy closely/PCP  - Hgb A1c 8.7% (improved from previous 11.9%)  - home regimen: Mounjaro 2.5mg weekly, basaglar 32 units every morning, admelog SS with meals  - c/w lantus 16 units every morning (decreased from 32U iso AM BG of 62) and SSI #2 while inpatient  - start empa (as above)  - OARRS reviewed, cont. Home gabapentin 300mg BID      HLD  PAD  - c/w asa 81mg daily, atorva 40mg daily        DVT ppx: subQ heparin  Emergency contacts: Boyfrienterrie Rousseau #[phone]  Brother Luis Squires #[phone]  DISPO: previously received HHC w/[facility]; likely resume at discharge   - HF navigator following, HF care established here at   - likely discharge home tomorrow pending improvement in Cr    All labs, vital signs, tests & imaging results, and medications were reviewed.     Patient seen and discussed with Dr. Mckee    Code Status:  Full Code    Betty Martin, APRN-CNP

## 2025-01-06 NOTE — DISCHARGE INSTRUCTIONS
"Hospital Summary:  Nuris, with your clinical presentation and after reviewing all of your lab work, tests, and imaging, you have been diagnosed with heart failure with preserved ejection fraction. You have been established with a heart failure provider here at  (Dr. Sparks). You were able to diurese with our intravenous medications, and we transitioned you to oral diuretics on an as needed basis (if you experience increased weight gain, shortness of breath, or bilateral lower leg swelling) to maintain a balanced fluid state (Torsemide). Further heart failure medications are not able to be added to your regimen until your kidney function further improves. We recommend you watch your sodium intake and limit it to 2-3grams every 24hrs, and limit your free water intake to 2 Liters in a day. Your blood pressure was elevated upon arrival and after making adjustments to medications, it is more controlled with the current regimen. We suspect your diabetes have caused a neurogenic bladder, making it more difficult for you to empty your bladder on your own. The urology doctors evaluated you and would like to perform an outpatient evaluation for urinary retention in their clinic on 2/6. Please refer to the written handout regarding Catheter care, and maintain care until your follow-up with urology. The kidney doctors evaluated you for your worsening kidney function as well, and we recommend you continue follow-up with them on an outpatient basis.     Follow-up Appointments:  - The appropriate follow-up appointments have already been arranged for you, with the exception of the sleep medicine lab. Their office will call you to arrange for a sleep medicine study for concerns for obstructive sleep apnea.  - If you have scheduling conflicts, please call #[phone] and say \"Schedule an Appointment\" to change any appointments.  - Please bring a photo ID, any copay and insurance card, a list of your current medications or " discharge instructions, please arrive 15 minutes prior to your appointment.      Lab Work:  Please go to any  outpatient lab to have your blood drawn PRIOR to your follow-up appointment with Dr. Sparks. You may go to the lab on the same day as your appointment, so the provider can discuss results with you. You do not need a paper prescription, it is electronically ordered.      Medications:  Your medications and/or doses may have changed. Please take the medications listed on these instructions as prescribed until you are seen at a follow up appointment. It was a pleasure to have met and cared for you!

## 2025-01-07 PROBLEM — I50.33 ACUTE ON CHRONIC DIASTOLIC (CONGESTIVE) HEART FAILURE: Status: ACTIVE | Noted: 2025-01-02

## 2025-01-07 LAB
ALBUMIN SERPL BCP-MCNC: 3.4 G/DL (ref 3.4–5)
ALBUMIN SERPL BCP-MCNC: 3.6 G/DL (ref 3.4–5)
ANION GAP SERPL CALC-SCNC: 15 MMOL/L (ref 10–20)
ANION GAP SERPL CALC-SCNC: 19 MMOL/L (ref 10–20)
BUN SERPL-MCNC: 52 MG/DL (ref 6–23)
BUN SERPL-MCNC: 57 MG/DL (ref 6–23)
CALCIUM SERPL-MCNC: 8.8 MG/DL (ref 8.6–10.6)
CALCIUM SERPL-MCNC: 9.1 MG/DL (ref 8.6–10.6)
CHLORIDE SERPL-SCNC: 103 MMOL/L (ref 98–107)
CHLORIDE SERPL-SCNC: 104 MMOL/L (ref 98–107)
CO2 SERPL-SCNC: 23 MMOL/L (ref 21–32)
CO2 SERPL-SCNC: 25 MMOL/L (ref 21–32)
CREAT SERPL-MCNC: 3.75 MG/DL (ref 0.5–1.05)
CREAT SERPL-MCNC: 3.82 MG/DL (ref 0.5–1.05)
EGFRCR SERPLBLD CKD-EPI 2021: 13 ML/MIN/1.73M*2
EGFRCR SERPLBLD CKD-EPI 2021: 13 ML/MIN/1.73M*2
ERYTHROCYTE [DISTWIDTH] IN BLOOD BY AUTOMATED COUNT: 14.3 % (ref 11.5–14.5)
GLUCOSE BLD MANUAL STRIP-MCNC: 138 MG/DL (ref 74–99)
GLUCOSE BLD MANUAL STRIP-MCNC: 160 MG/DL (ref 74–99)
GLUCOSE BLD MANUAL STRIP-MCNC: 184 MG/DL (ref 74–99)
GLUCOSE BLD MANUAL STRIP-MCNC: 227 MG/DL (ref 74–99)
GLUCOSE SERPL-MCNC: 170 MG/DL (ref 74–99)
GLUCOSE SERPL-MCNC: 207 MG/DL (ref 74–99)
HCT VFR BLD AUTO: 32.1 % (ref 36–46)
HGB BLD-MCNC: 9.9 G/DL (ref 12–16)
MAGNESIUM SERPL-MCNC: 2.27 MG/DL (ref 1.6–2.4)
MCH RBC QN AUTO: 29.6 PG (ref 26–34)
MCHC RBC AUTO-ENTMCNC: 30.8 G/DL (ref 32–36)
MCV RBC AUTO: 96 FL (ref 80–100)
NRBC BLD-RTO: 0 /100 WBCS (ref 0–0)
PHOSPHATE SERPL-MCNC: 5.1 MG/DL (ref 2.5–4.9)
PHOSPHATE SERPL-MCNC: 5.4 MG/DL (ref 2.5–4.9)
PLATELET # BLD AUTO: 330 X10*3/UL (ref 150–450)
POTASSIUM SERPL-SCNC: 4.2 MMOL/L (ref 3.5–5.3)
POTASSIUM SERPL-SCNC: 4.5 MMOL/L (ref 3.5–5.3)
RBC # BLD AUTO: 3.34 X10*6/UL (ref 4–5.2)
SODIUM SERPL-SCNC: 139 MMOL/L (ref 136–145)
SODIUM SERPL-SCNC: 141 MMOL/L (ref 136–145)
WBC # BLD AUTO: 5.9 X10*3/UL (ref 4.4–11.3)

## 2025-01-07 PROCEDURE — 2500000001 HC RX 250 WO HCPCS SELF ADMINISTERED DRUGS (ALT 637 FOR MEDICARE OP): Performed by: NURSE PRACTITIONER

## 2025-01-07 PROCEDURE — 85027 COMPLETE CBC AUTOMATED: CPT | Performed by: NURSE PRACTITIONER

## 2025-01-07 PROCEDURE — 99232 SBSQ HOSP IP/OBS MODERATE 35: CPT | Performed by: STUDENT IN AN ORGANIZED HEALTH CARE EDUCATION/TRAINING PROGRAM

## 2025-01-07 PROCEDURE — 80069 RENAL FUNCTION PANEL: CPT | Performed by: NURSE PRACTITIONER

## 2025-01-07 PROCEDURE — 82947 ASSAY GLUCOSE BLOOD QUANT: CPT

## 2025-01-07 PROCEDURE — 83735 ASSAY OF MAGNESIUM: CPT | Performed by: NURSE PRACTITIONER

## 2025-01-07 PROCEDURE — 2500000004 HC RX 250 GENERAL PHARMACY W/ HCPCS (ALT 636 FOR OP/ED): Performed by: NURSE PRACTITIONER

## 2025-01-07 PROCEDURE — 1200000002 HC GENERAL ROOM WITH TELEMETRY DAILY

## 2025-01-07 PROCEDURE — 36415 COLL VENOUS BLD VENIPUNCTURE: CPT | Performed by: NURSE PRACTITIONER

## 2025-01-07 PROCEDURE — 2500000001 HC RX 250 WO HCPCS SELF ADMINISTERED DRUGS (ALT 637 FOR MEDICARE OP)

## 2025-01-07 PROCEDURE — 2500000002 HC RX 250 W HCPCS SELF ADMINISTERED DRUGS (ALT 637 FOR MEDICARE OP, ALT 636 FOR OP/ED)

## 2025-01-07 PROCEDURE — 2500000002 HC RX 250 W HCPCS SELF ADMINISTERED DRUGS (ALT 637 FOR MEDICARE OP, ALT 636 FOR OP/ED): Performed by: NURSE PRACTITIONER

## 2025-01-07 PROCEDURE — 2500000001 HC RX 250 WO HCPCS SELF ADMINISTERED DRUGS (ALT 637 FOR MEDICARE OP): Performed by: STUDENT IN AN ORGANIZED HEALTH CARE EDUCATION/TRAINING PROGRAM

## 2025-01-07 RX ADMIN — HEPARIN SODIUM 7500 UNITS: 5000 INJECTION INTRAVENOUS; SUBCUTANEOUS at 14:29

## 2025-01-07 RX ADMIN — GABAPENTIN 300 MG: 300 CAPSULE ORAL at 20:02

## 2025-01-07 RX ADMIN — INSULIN LISPRO 2 UNITS: 100 INJECTION, SOLUTION INTRAVENOUS; SUBCUTANEOUS at 18:04

## 2025-01-07 RX ADMIN — ACETAMINOPHEN 650 MG: 325 TABLET ORAL at 09:12

## 2025-01-07 RX ADMIN — ISOSORBIDE DINITRATE 10 MG: 10 TABLET ORAL at 08:40

## 2025-01-07 RX ADMIN — INSULIN GLARGINE 16 UNITS: 100 INJECTION, SOLUTION SUBCUTANEOUS at 08:33

## 2025-01-07 RX ADMIN — FUROSEMIDE 40 MG: 40 TABLET ORAL at 08:33

## 2025-01-07 RX ADMIN — GABAPENTIN 300 MG: 300 CAPSULE ORAL at 08:33

## 2025-01-07 RX ADMIN — INSULIN LISPRO 2 UNITS: 100 INJECTION, SOLUTION INTRAVENOUS; SUBCUTANEOUS at 12:43

## 2025-01-07 RX ADMIN — FOLIC ACID 1 MG: 1 TABLET ORAL at 08:40

## 2025-01-07 RX ADMIN — EMPAGLIFLOZIN 10 MG: 10 TABLET, FILM COATED ORAL at 08:33

## 2025-01-07 RX ADMIN — HYDRALAZINE HYDROCHLORIDE 50 MG: 50 TABLET ORAL at 14:29

## 2025-01-07 RX ADMIN — HYDRALAZINE HYDROCHLORIDE 50 MG: 50 TABLET ORAL at 08:33

## 2025-01-07 RX ADMIN — AMLODIPINE BESYLATE 10 MG: 10 TABLET ORAL at 08:33

## 2025-01-07 RX ADMIN — NEPHROCAP 1 CAPSULE: 1 CAP ORAL at 08:33

## 2025-01-07 RX ADMIN — HEPARIN SODIUM 7500 UNITS: 5000 INJECTION INTRAVENOUS; SUBCUTANEOUS at 05:18

## 2025-01-07 RX ADMIN — ISOSORBIDE DINITRATE 10 MG: 10 TABLET ORAL at 18:04

## 2025-01-07 RX ADMIN — HEPARIN SODIUM 7500 UNITS: 5000 INJECTION INTRAVENOUS; SUBCUTANEOUS at 21:27

## 2025-01-07 RX ADMIN — HYDRALAZINE HYDROCHLORIDE 50 MG: 50 TABLET ORAL at 20:02

## 2025-01-07 RX ADMIN — ACETAMINOPHEN 650 MG: 325 TABLET ORAL at 20:02

## 2025-01-07 RX ADMIN — ISOSORBIDE DINITRATE 10 MG: 10 TABLET ORAL at 14:29

## 2025-01-07 RX ADMIN — ASPIRIN 81 MG: 81 TABLET, CHEWABLE ORAL at 08:33

## 2025-01-07 RX ADMIN — ATORVASTATIN CALCIUM 40 MG: 40 TABLET, FILM COATED ORAL at 20:02

## 2025-01-07 ASSESSMENT — PAIN - FUNCTIONAL ASSESSMENT
PAIN_FUNCTIONAL_ASSESSMENT: 0-10

## 2025-01-07 ASSESSMENT — COGNITIVE AND FUNCTIONAL STATUS - GENERAL
CLIMB 3 TO 5 STEPS WITH RAILING: A LITTLE
MOBILITY SCORE: 22
WALKING IN HOSPITAL ROOM: A LITTLE
CLIMB 3 TO 5 STEPS WITH RAILING: A LITTLE
MOBILITY SCORE: 22
WALKING IN HOSPITAL ROOM: A LITTLE
DAILY ACTIVITIY SCORE: 24
DAILY ACTIVITIY SCORE: 24

## 2025-01-07 ASSESSMENT — PAIN DESCRIPTION - DESCRIPTORS
DESCRIPTORS: ACHING
DESCRIPTORS: ACHING

## 2025-01-07 ASSESSMENT — PAIN SCALES - GENERAL
PAINLEVEL_OUTOF10: 2
PAINLEVEL_OUTOF10: 0 - NO PAIN
PAINLEVEL_OUTOF10: 3

## 2025-01-07 ASSESSMENT — PAIN DESCRIPTION - LOCATION: LOCATION: HEAD

## 2025-01-07 ASSESSMENT — PAIN DESCRIPTION - ORIENTATION: ORIENTATION: MID

## 2025-01-07 NOTE — PROGRESS NOTES
[facility] Attending Shared Visit Note    This is a shared visit. Please see Advanced Practice Provider's encounter note for additional details.      Briefly, 59-year-old female from [place] with PMH of poorly controlled insulin-dependent T2DM c/b neuropathy, hyperlipidemia, PAD, CKD IV, HTN, multiple foot infections s/p partial R great toe amputation, fall w/ L femur fracture s/p ORIF (4/2024) presenting with SOB and HTN urgency. On presentation, reated with BiPAP, IV Lasix, hydralazine, and isordil. Now weaned to RA, BNP down, BP labile but trending lower. Renal function labile and worsened with diuresis    Echo showed EF 69%, moderate MR/TR, elevated LAP, and small pericardial effusion.     Exam: Breathing unlabored. Regular. Warm. 1+ edema    Plan:   HTN management:  - Amlodipine: 10 mg PO daily  - Hydralazine: 50 mg PO TID  - Isosorbide dinitrate: 10 mg PO TID (reduced from 20 mg due to headache)  - Spironolactone and SGLT2i: Not initiated due to current renal function (CKD IV)  - Diuretics: PO Lasix 40 mg daily (transitioned from IV Lasix 80 mg BID)  -- PRN Hydralazine: 5 mg IVP q6h for SBP >180  -- Clonidine: Discontinued during admission    - Given worsening renal function and ongoing dyspnea will obtain [facility]    Dispo: Discharge pending further HF med optimization and close outpatient follow-up.    Luis Mckee MD    Objective   Admit Date: 1/2/2025  Hospital Length of Stay: 5   Home: [place] [address]    MEDICATIONS  Infusions:     Scheduled:  amLODIPine, 10 mg, Daily  aspirin, 81 mg, Daily  atorvastatin, 40 mg, Nightly  empagliflozin, 10 mg, Daily  folic acid, 1 mg, Daily  furosemide, 40 mg, BID AC  gabapentin, 300 mg, BID  heparin (porcine), 7,500 Units, q8h NEHEMIAS  hydrALAZINE, 50 mg, TID  insulin glargine, 16 Units, Daily  insulin lispro, 0-10 Units, TID AC  isosorbide dinitrate, 10 mg, TID  polyethylene glycol, 17 g, Daily  vitamin B complex-vitamin C-folic acid, 1 capsule,  Daily      PRN:  acetaminophen, 650 mg, q6h PRN  albuterol, 2.5 mg, q6h PRN  dextrose, 12.5 g, q15 min PRN  dextrose, 25 g, q15 min PRN  glucagon, 1 mg, q15 min PRN  glucagon, 1 mg, q15 min PRN  hydrALAZINE, 5 mg, q6h PRN  oxygen, , Continuous PRN - O2/gases  oxygen, , Continuous PRN - O2/gases      Prior to Admission Meds:  Medications Prior to Admission   Medication Sig Dispense Refill Last Dose/Taking    acetaminophen (Tylenol) 325 mg tablet Take 2 tablets (650 mg) by mouth every 4 hours if needed for mild pain (1 - 3).   Past Month    albuterol 90 mcg/actuation inhaler Inhale 2 puffs every 6 hours if needed for wheezing. 6.7 g 0 Past Week    b complex vitamins capsule Take 1 capsule by mouth once daily.   1/1/2025    cloNIDine (Catapres) 0.1 mg tablet Take 1 tablet (0.1 mg) by mouth 2 times a day. 180 tablet 0 1/1/2025    folic acid (Folvite) 1 mg tablet Take 1 tablet (1 mg) by mouth once daily.   1/1/2025    gabapentin (Neurontin) 300 mg capsule Take 1 capsule (300 mg) by mouth 2 times a day. 180 capsule 0 1/1/2025    insulin glargine (Basaglar KwikPen U-100 Insulin) 100 unit/mL (3 mL) pen Inject 32 Units under the skin once daily in the morning. Take as directed per insulin instructions. 15 mL 1 12/31/2024    insulin lispro (Admelog SoloStar U-100 Insulin) 100 unit/mL injection Inject per sliding scale instructions under the skin 3 times a day with meals. (Max daily dose 70 units) 15 mL 1 12/31/2024    tirzepatide (Mounjaro) 2.5 mg/0.5 mL pen injector Inject 2.5 mg under the skin every 7 days. 2 mL 0 12/30/2024    [DISCONTINUED] amLODIPine (Norvasc) 10 mg tablet Take 1 tablet (10 mg) by mouth once daily. 90 tablet 0 1/1/2025    [DISCONTINUED] aspirin 81 mg chewable tablet Chew 1 tablet (81 mg) once daily. 90 tablet 0 1/1/2025 Noon    [DISCONTINUED] atorvastatin (Lipitor) 40 mg tablet Take 1 tablet (40 mg) by mouth once daily. 90 tablet 0 1/1/2025    blood-glucose sensor (FreeStyle Remington 3 Sensor) device Use to  "monitor blood glucose. Change sensor every 14 days. 2 each 11     FreeStyle Remington 3 Elizabeth misc Use as instructed to monitor blood glucose. 1 each 0     furosemide (Lasix) 20 mg tablet Take 1 tablet (20 mg) by mouth once daily.       meclizine (Antivert) 25 mg tablet,chewable Chew 1 tablet (25 mg) 3 times a day as needed (vertigo). (Patient not taking: Reported on 1/2/2025) 90 tablet 1 Not Taking    OneTouch Delica Plus Lancet 30 gauge misc USE TO TEST BLOOD SUGAR AS DIRECTED 200 each 0     OneTouch Ultra Test strip Use 1 strip BID to check glucose 200 strip 1     OneTouch Ultra2 Meter misc use 1 to 2 times daily 1 each 0     pen needle, diabetic 32 gauge x 5/32\" needle Use four times a day with insulin use 360 each 1        Vitals:      1/7/2025     5:17 AM 1/7/2025     1:40 AM 1/6/2025     9:29 PM 1/6/2025     5:08 PM 1/6/2025     1:31 PM 1/6/2025     8:08 AM 1/6/2025     6:57 AM   Vitals   Systolic 155 137 137 147 136 107    Diastolic 76 65 76 69 70 61    BP Location Right arm Right arm Right arm Right arm Right arm Right arm    Heart Rate 81 85 92 89 80 78    Temp 36.8 °C (98.2 °F) 36.7 °C (98.1 °F) 37 °C (98.6 °F) 36.9 °C (98.4 °F) 36.9 °C (98.4 °F) 36.5 °C (97.7 °F)    Resp 18 18 18 18 16 18    Weight (lb)  235.6     233.6   BMI  43.09 kg/m2     42.73 kg/m2   BSA (m2)  2.16 m2     2.15 m2     Wt Readings from Last 5 Encounters:   01/07/25 107 kg (235 lb 9.6 oz)   11/19/24 99.8 kg (220 lb)   11/15/24 99.8 kg (220 lb 0.3 oz)   09/05/24 99.8 kg (220 lb)   09/04/24 102 kg (224 lb)       Intake/Output Summary (Last 24 hours) at 1/7/2025 0822  Last data filed at 1/7/2025 0517  Gross per 24 hour   Intake 840 ml   Output 1400 ml   Net -560 ml       CHEST: Unlabored, Clear, Diminished  CV:  Normal sinus rhythm  NEURO:   RASS:    CAM:    LOC: Alert  Cognition: Appropriate judgement  GCS: 15    DATA:  CMP:  Recent Labs     01/06/25 2039 01/05/25 2008 01/04/25 2119 01/04/25  0700 01/03/25 2009 01/02/25  1835 " 01/02/25  1253 09/06/24  0120 04/24/24  1132 04/23/24  0803 04/19/24  0636 04/17/24  0901    135* 141 142 142 144 142 143   < > 138 135* 134*   K 4.5 4.6 4.2 4.1 4.2 4.6 4.5 4.1   < > 5.0 4.9 4.5    102 104 108* 109* 109* 111* 108*   < > 105 103 102   CO2 23 24 27 27 26 22 23 27   < > 23 22 23   ANIONGAP 19 14 14 11 11 18 13 12   < > 15 15 14   BUN 52* 46* 41* 36* 35* 29* 29* 29*   < > 36* 37* 31*   CREATININE 3.75* 2.93* 2.74* 2.55* 2.49* 2.31* 2.34* 1.68*   < > 1.64* 2.20* 2.03*   EGFR 13* 18* 19* 21* 22* 24* 23* 35*   < > 36* 25* 28*   MG 2.10 2.50* 2.04  --  2.06 2.50*  --   --   --  2.55* 2.49* 2.08    < > = values in this interval not displayed.     Recent Labs     01/06/25 2039 01/05/25 2008 01/04/25  0700 01/03/25 2009 01/02/25  1835 01/02/25  1253 09/06/24  0120 04/24/24  1132 04/17/24  0901 04/15/24  2128 02/07/24  0422 02/06/24  0735 02/04/24  0555 02/03/24  0122 01/26/24  1343 01/26/24  0358 01/25/24  1457 01/24/24  2032 01/23/24  1230 01/12/24  0603 01/11/24  0747 01/09/24  1210 08/13/23  1712 07/04/19  0707 04/02/19  1106 02/17/19  1207   ALBUMIN 3.4 3.1* 2.9* 3.1* 3.9 3.6 3.3* 2.6*   < > 3.5   < > 2.9*   < > 3.4   < > 2.7*   < > 3.4 4.1   < > 2.5*   < > 3.6   < > 3.9 3.8  3.8   ALT  --   --   --   --   --  12 10  --   --  23  --   --   --  22  --  19  --  25 16  --  6   < > 11   < > 12 10   AST  --   --   --   --   --  14 11  --   --  22  --   --   --  21  --  15  --  28 17  --  8   < > 11   < > 13 12   BILITOT  --   --   --   --   --  0.4 0.4  --   --  0.4  --   --   --  0.4  --  0.2  --  0.4 0.3  --  <0.2   < > 0.4   < > 0.5 0.4   LIPASE  --   --   --   --   --   --   --   --   --   --   --  5*  --   --   --   --   --   --   --   --   --   --  <3*  --  7* 3*    < > = values in this interval not displayed.     CBC:  Recent Labs     01/06/25 2039 01/05/25 2008 01/04/25 2119 01/03/25 2009 01/02/25  1835 01/02/25  1253 09/06/24  0120 04/24/24  1132   WBC 6.5 6.5 6.9 5.3 5.9 5.4 7.4  9.7   HGB 9.9* 9.3* 9.8* 9.4* 11.1* 11.1* 10.3* 6.2*   HCT 30.8* 27.9* 31.6* 30.0* 33.3* 33.1* 31.9* 19.9*    298 329 285 369 363 318 469*   MCV 93 89 93 95 90 90 88 95     COAG:   Recent Labs     04/15/24  2128 01/29/24  1217 01/24/24  2032 10/21/19  1709 04/02/19  1106 02/17/19  1207   INR 0.9 1.1 1.2* 1.0 1.0 1.1     ABO:   Recent Labs     04/17/24  0901   ABO A     HEME/ENDO:   Recent Labs     01/02/25  1835 11/19/24  1406 07/19/24  0852 04/16/24  0637 02/03/24  0122 01/26/24  0358 01/24/24 2032 01/18/24  0525 01/15/24  0606 09/14/23  1047 06/14/23  1016 05/08/23  1320 01/27/23  0840 01/26/23  1613   FERRITIN  --   --   --   --   --   --   --  276* 487*  --   --   --   --   --    IRONSAT  --   --   --   --   --   --   --  34 32  --  25  --   --   --    TSH  --   --   --   --  0.46 1.37  --   --   --   --  0.83  --   --  0.27*   FREET4  --   --   --   --   --   --   --   --   --   --   --   --  1.16 1.14   HGBA1C 8.7* 9* 11.2* 11.9*  --   --    < >  --   --    < >  --    < >  --   --     < > = values in this interval not displayed.     CARDIAC:   Recent Labs     01/04/25  2119 01/02/25  1253 02/03/24  0122 01/24/24 2032 01/25/23  1658 01/25/23  1616   TROPHS  --  21 12 25 7 8   * 376* 185* 311* 24  --      Recent Labs     05/08/23  0935 04/12/21  0906 10/20/20  1236   CHOL 174 161 181   LDLF 98 101* 115*   HDL 38.5* 38.9* 41.5   TRIG 189* 104 121     TOX:  Recent Labs     02/03/24  0800   AMPHETAMINE Presumptive Negative   BENZO Presumptive Negative   CANNABINOID Presumptive Negative   COCAI Presumptive Negative   FENTANYL Presumptive Negative   OPIATE Presumptive Negative   OXYCODONE Presumptive Negative   PCP Presumptive Negative     MICRO:   Recent Labs     02/05/24  0446 02/03/24  0953 02/03/24  0122 01/29/24  1217 01/25/24  0819 01/23/24  1230   CRP 1.29*  --  1.86*  --   --  0.21   PROCAL  --  0.05  --  0.05 0.14*  --      No results found for the last 90 days.      EKG:   Recent Labs      01/02/25  1225 09/06/24  0337 04/15/24  2155   ATRRATE 88 81 77   VENTRATE 88 81 77   PRINT 128 120 122   QRSDUR 82 86 86   QTCFRED 416 424 382   QTCCALCB 442 446 398     Encounter Date: 01/02/25   ECG 12 lead   Result Value    Ventricular Rate 88    Atrial Rate 88    ID Interval 128    QRS Duration 82    QT Interval 366    QTC Calculation(Bazett) 442    P Axis 58    R Axis 134    T Axis -1    QRS Count 14    Q Onset 221    P Onset 157    P Offset 214    T Offset 404    QTC Fredericia 416    Narrative    Normal sinus rhythm  Left posterior fascicular block  Cannot rule out Anterior infarct (cited on or before 15-ISAK-2024)  Abnormal ECG  When compared with ECG of 06-SEP-2024 03:33,  Left posterior fascicular block is now Present  Inverted T waves have replaced nonspecific T wave abnormality in Inferior leads    See ED provider note for full interpretation and clinical correlation  Confirmed by Kaye Jay ([id number]) on 1/3/2025 12:44:14 AM     Echocardiogram:   Recent Labs     01/03/25  1010 01/25/24  1100   EF 69  --    LVIDD 4.41 4.35   RV 45.2 32.4   RVFRWALLPKSP 10.00 9.57   TAPSE 2.5 2.0   Transthoracic Echo (TTE) Complete With Contrast 01/03/2025  Left Ventricle: Left ventricular ejection fraction is normal, calculated by Argueta's biplane at 69%. There are no regional left ventricular wall motion abnormalities. The left ventricular cavity size is normal. There is mildly increased septal and mildly increased posterior left ventricular wall thickness. There is left ventricular concentric remodeling. Spectral Doppler shows a Grade II (pseudonormal pattern) of left ventricular diastolic filling with an elevated left atrial pressure.  Left Atrium: The left atrium is moderately dilated.  Right Ventricle: The right ventricle is normal in size. There is normal right ventricular global systolic function.  Right Atrium: The right atrium is mildly dilated.  Aortic Valve: The aortic valve is trileaflet. There is minimal  aortic valve cusp calcification. There is no evidence of aortic valve regurgitation. The peak instantaneous gradient of the aortic valve is 11 mmHg.  Mitral Valve: The mitral valve is normal in structure. There is moderate mitral valve regurgitation which is posteriorly directed. The mitral regurgitant orifice area is 31 mm2. The mitral regurgitant volume is 55.50 ml. Mechanism of MR Is possibly restricted motion of the posterior leaflet.  Tricuspid Valve: The tricuspid valve is structurally normal. There is moderate tricuspid regurgitation. The Doppler estimated RVSP is moderately elevated at 45.2 mmHg.  Pulmonic Valve: The pulmonic valve is structurally normal. There is physiologic pulmonic valve regurgitation.  Pericardium: Small pericardial effusion. The effusion is circumferential.  Pleural: There is a small left pleural effusion.  Aorta: The aortic root is normal. The Ao Sinus is 2.90 cm. The Asc Ao is 3.30 cm.  Systemic Veins: The inferior vena cava appears normal in size, with IVC inspiratory collapse greater than 50%.  In comparison to the previous echocardiogram(s): Compared with study dated 1/25/2024, the degree of MR appears moderate today rather than mild on prior study. TR is also worsened.      CONCLUSIONS:  1. Left ventricular ejection fraction is normal, calculated by Argueta's biplane at 69%.  2. Spectral Doppler shows a Grade II (pseudonormal pattern) of left ventricular diastolic filling with an elevated left atrial pressure.  3. There is normal right ventricular global systolic function.  4. The left atrium is moderately dilated.  5. Moderate mitral valve regurgitation.  6. Mechanism of MR Is possibly restricted motion of the posterior leaflet.  7. Moderate tricuspid regurgitation visualized.  8. Moderately elevated right ventricular systolic pressure.  9. Small pericardial effusion.  10. Compared with study dated 1/25/2024, the degree of MR appears moderate today rather than mild on prior  study. TR is also worsened.    QUANTITATIVE DATA SUMMARY:    2D MEASUREMENTS:          Normal Ranges:  LAs:             3.32 cm  (2.7-4.0cm)  RVIDd:           2.76 cm  (0.9-3.6cm)  IVSd:            1.10 cm  (0.6-1.1cm)  LVPWd:           0.96 cm  (0.6-1.1cm)  LVIDd:           4.41 cm  (3.9-5.9cm)  LVIDs:           2.83 cm  LV Mass Index:   76 g/m2  LVEDV Index:     44 ml/m2  LV % FS          35.8 %      LA VOLUME:                    Normal Ranges:  LA Vol A4C:        88.4 ml    (22+/-6mL/m2)  LA Vol A2C:        79.6 ml  LA Vol BP:         85.3 ml  LA Vol Index A4C:  43.1ml/m2  LA Vol Index A2C:  38.8 ml/m2  LA Vol Index BP:   41.6 ml/m2  LA Area A4C:       25.6 cm2  LA Area A2C:       23.9 cm2  LA Major Axis A4C: 6.3 cm  LA Major Axis A2C: 6.1 cm  LA Volume Index:   35.2 ml/m2      RA VOLUME BY A/L METHOD:            Normal Ranges:  RA Vol A4C:              73.5 ml    (8.3-19.5ml)  RA Vol Index A4C:        35.8 ml/m2  RA Area A4C:             21.0 cm2  RA Major Axis A4C:       5.1 cm      AORTA MEASUREMENTS:         Normal Ranges:  Ao Sinus, d:        2.90 cm (2.1-3.5cm)  Asc Ao, d:          3.30 cm (2.1-3.4cm)      LV SYSTOLIC FUNCTION BY 2D PLANIMETRY (MOD):  Normal Ranges:  EF-A4C View:    75 % (>=55%)  EF-A2C View:    66 %  EF-Biplane:     69 %  LV EF Reported: 69 %      LV DIASTOLIC FUNCTION:             Normal Ranges:  MV Peak E:             1.19 m/s    (0.7-1.2 m/s)  MV Peak A:             1.00 m/s    (0.42-0.7 m/s)  E/A Ratio:             1.19        (1.0-2.2)  MV e'                  0.040 m/s   (>8.0)  MV lateral e'          0.05 m/s  MV medial e'           0.03 m/s  MV A Dur:              183.39 msec  E/e' Ratio:            29.68       (<8.0)  PulmV Sys Molina:         49.18 cm/s  PulmV Robles Molina:        57.26 cm/s  PulmV S/D Molina:         0.86  PulmV A Revs Molina:      21.81 cm/s  PulmV A Revs Dur:      114.19 msec      MITRAL VALVE:          Normal Ranges:  MV DT:        232 msec (150-240msec)      MITRAL  INSUFFICIENCY:             Normal Ranges:  MR VTI:               180.58 cm  MR Vmax:              523.70 cm/s  MR Volume:            55.50 ml  MR Flow Rt:           160.96 ml/s  MR EROA:              31 mm2      AORTIC VALVE:            Normal Ranges:  AoV Vmax:      1.68 m/s  (<=1.7m/s)  AoV Peak P.3 mmHg (<20mmHg)  LVOT Max Molina:  1.33 m/s  (<=1.1m/s)  LVOT VTI:      26.11 cm  LVOT Diameter: 1.79 cm   (1.8-2.4cm)  AoV Area,Vmax: 2.00 cm2  (2.5-4.5cm2)      RIGHT VENTRICLE:  RV Basal 3.50 cm  RV Mid   2.80 cm  RV Major 7.1 cm  TAPSE:   25.0 mm  RV s'    0.10 m/s      TRICUSPID VALVE/RVSP:          Normal Ranges:  Peak TR Velocity:     3.25 m/s  Est. RA Pressure:     3 mmHg  RV Syst Pressure:     45 mmHg  (< 30mmHg)  IVC Diam:             1.90 cm      PULMONIC VALVE:          Normal Ranges:  PV Accel Time:  131 msec (>120ms)  PV Max Molina:     1.0 m/s  (0.6-0.9m/s)  PV Max PG:      3.8 mmHg      Pulmonary Veins:  PulmV A Revs Dur: 114.19 msec  PulmV A Revs Molina: 21.81 cm/s  PulmV Robles Molina:   57.26 cm/s  PulmV S/D Molina:    0.86  PulmV Sys Molina:    49.18 cm/s      AORTA:  Asc Ao Diam 3.29 cm      [id number] Ninfa Pastor MD  Electronically signed on 1/3/2025 at 12:11:49 PM        ** Final **    Coronary Angiography: No results found for this or any previous visit from the past 1800 days.    Right Heart Cath: No results found for this or any previous visit from the past 1800 days.    Cardiac Scoring: No results found for this or any previous visit from the past 1800 days.    Cardiac MRI: No results found for this or any previous visit from the past 1800 days.    Nuclear:No results found for this or any previous visit from the past 1800 days.    Metabolic Stress: No results found for this or any previous visit from the past 1800 days.      Transthoracic Echo (TTE) Complete    Result Date: 1/3/2025   [facility], [address] [place] [address]                Tel [phone] and Fax [phone]  TRANSTHORACIC ECHOCARDIOGRAM REPORT  Patient Name:       ALBERT RIDLEY      Reading Physician:    [id number] Ninfa Pastor MD Study Date:         1/3/2025            Ordering Provider:    [id number] KAR ANTHONY MRN/PID:            [id number]            Fellow:               [id number] Abby Hess MD Accession#:         [id number]        Nurse: Date of Birth/Age:  1965 / 59      Sonographer:          Cassie Chopra RCS                     years Gender assigned at  F                   Additional Staff: Birth: Height:             157.48 cm           Admit Date: Weight:             107.05 kg           Admission Status:     Inpatient -                                                               Routine BSA / BMI:          2.05 m2 / 43.16     Encounter#:           [id number]                     kg/m2 Blood Pressure:     147/83 mmHg         Department Location:  [facility]                                                               Non Invasive Study Type:    TRANSTHORACIC ECHO (TTE) COMPLETE Diagnosis/ICD: Acute on chronic diastolic (congestive) heart failure                (CHF)-I50.33 Indication:    ADHF CPT Code:      Echo Complete w Full Doppler-[id number] Patient History: Diabetes:          Yes Pertinent History: Hyperlipidemia and PVD. ADHF, PAD. Study Detail: The following Echo studies were performed: 2D, M-Mode, Doppler and               color flow. Technically challenging study due to body habitus.               Definity used as a contrast agent for endocardial border               definition. Total contrast used for this procedure was 2 mL via IV               push.  PHYSICIAN INTERPRETATION: Left Ventricle: Left ventricular ejection fraction is normal, calculated by Argueta's biplane at 69%. There are no regional left  ventricular wall motion abnormalities. The left ventricular cavity size is normal. There is mildly increased septal and mildly increased posterior left ventricular wall thickness. There is left ventricular concentric remodeling. Spectral Doppler shows a Grade II (pseudonormal pattern) of left ventricular diastolic filling with an elevated left atrial pressure. Left Atrium: The left atrium is moderately dilated. Right Ventricle: The right ventricle is normal in size. There is normal right ventricular global systolic function. Right Atrium: The right atrium is mildly dilated. Aortic Valve: The aortic valve is trileaflet. There is minimal aortic valve cusp calcification. There is no evidence of aortic valve regurgitation. The peak instantaneous gradient of the aortic valve is 11 mmHg. Mitral Valve: The mitral valve is normal in structure. There is moderate mitral valve regurgitation which is posteriorly directed. The mitral regurgitant orifice area is 31 mm2. The mitral regurgitant volume is 55.50 ml. Mechanism of MR Is possibly restricted motion of the posterior leaflet. Tricuspid Valve: The tricuspid valve is structurally normal. There is moderate tricuspid regurgitation. The Doppler estimated RVSP is moderately elevated at 45.2 mmHg. Pulmonic Valve: The pulmonic valve is structurally normal. There is physiologic pulmonic valve regurgitation. Pericardium: Small pericardial effusion. The effusion is circumferential. Pleural: There is a small left pleural effusion. Aorta: The aortic root is normal. The Ao Sinus is 2.90 cm. The Asc Ao is 3.30 cm. Systemic Veins: The inferior vena cava appears normal in size, with IVC inspiratory collapse greater than 50%. In comparison to the previous echocardiogram(s): Compared with study dated 1/25/2024, the degree of MR appears moderate today rather than mild on prior study. TR is also worsened.  CONCLUSIONS:  1. Left ventricular ejection fraction is normal, calculated by Argueta's  biplane at 69%.  2. Spectral Doppler shows a Grade II (pseudonormal pattern) of left ventricular diastolic filling with an elevated left atrial pressure.  3. There is normal right ventricular global systolic function.  4. The left atrium is moderately dilated.  5. Moderate mitral valve regurgitation.  6. Mechanism of MR Is possibly restricted motion of the posterior leaflet.  7. Moderate tricuspid regurgitation visualized.  8. Moderately elevated right ventricular systolic pressure.  9. Small pericardial effusion. 10. Compared with study dated 1/25/2024, the degree of MR appears moderate today rather than mild on prior study. TR is also worsened. QUANTITATIVE DATA SUMMARY:  2D MEASUREMENTS:          Normal Ranges: LAs:             3.32 cm  (2.7-4.0cm) RVIDd:           2.76 cm  (0.9-3.6cm) IVSd:            1.10 cm  (0.6-1.1cm) LVPWd:           0.96 cm  (0.6-1.1cm) LVIDd:           4.41 cm  (3.9-5.9cm) LVIDs:           2.83 cm LV Mass Index:   76 g/m2 LVEDV Index:     44 ml/m2 LV % FS          35.8 %  LA VOLUME:                    Normal Ranges: LA Vol A4C:        88.4 ml    (22+/-6mL/m2) LA Vol A2C:        79.6 ml LA Vol BP:         85.3 ml LA Vol Index A4C:  43.1ml/m2 LA Vol Index A2C:  38.8 ml/m2 LA Vol Index BP:   41.6 ml/m2 LA Area A4C:       25.6 cm2 LA Area A2C:       23.9 cm2 LA Major Axis A4C: 6.3 cm LA Major Axis A2C: 6.1 cm LA Volume Index:   35.2 ml/m2  RA VOLUME BY A/L METHOD:            Normal Ranges: RA Vol A4C:              73.5 ml    (8.3-19.5ml) RA Vol Index A4C:        35.8 ml/m2 RA Area A4C:             21.0 cm2 RA Major Axis A4C:       5.1 cm  AORTA MEASUREMENTS:         Normal Ranges: Ao Sinus, d:        2.90 cm (2.1-3.5cm) Asc Ao, d:          3.30 cm (2.1-3.4cm)  LV SYSTOLIC FUNCTION BY 2D PLANIMETRY (MOD):                      Normal Ranges: EF-A4C View:    75 % (>=55%) EF-A2C View:    66 % EF-Biplane:     69 % LV EF Reported: 69 %  LV DIASTOLIC FUNCTION:             Normal Ranges: MV Peak E:              1.19 m/s    (0.7-1.2 m/s) MV Peak A:             1.00 m/s    (0.42-0.7 m/s) E/A Ratio:             1.19        (1.0-2.2) MV e'                  0.040 m/s   (>8.0) MV lateral e'          0.05 m/s MV medial e'           0.03 m/s MV A Dur:              183.39 msec E/e' Ratio:            29.68       (<8.0) PulmV Sys Molina:         49.18 cm/s PulmV Robles Molina:        57.26 cm/s PulmV S/D Molina:         0.86 PulmV A Revs Molina:      21.81 cm/s PulmV A Revs Dur:      114.19 msec  MITRAL VALVE:          Normal Ranges: MV DT:        232 msec (150-240msec)  MITRAL INSUFFICIENCY:             Normal Ranges: MR VTI:               180.58 cm MR Vmax:              523.70 cm/s MR Volume:            55.50 ml MR Flow Rt:           160.96 ml/s MR EROA:              31 mm2  AORTIC VALVE:            Normal Ranges: AoV Vmax:      1.68 m/s  (<=1.7m/s) AoV Peak P.3 mmHg (<20mmHg) LVOT Max Molina:  1.33 m/s  (<=1.1m/s) LVOT VTI:      26.11 cm LVOT Diameter: 1.79 cm   (1.8-2.4cm) AoV Area,Vmax: 2.00 cm2  (2.5-4.5cm2)  RIGHT VENTRICLE: RV Basal 3.50 cm RV Mid   2.80 cm RV Major 7.1 cm TAPSE:   25.0 mm RV s'    0.10 m/s  TRICUSPID VALVE/RVSP:          Normal Ranges: Peak TR Velocity:     3.25 m/s Est. RA Pressure:     3 mmHg RV Syst Pressure:     45 mmHg  (< 30mmHg) IVC Diam:             1.90 cm  PULMONIC VALVE:          Normal Ranges: PV Accel Time:  131 msec (>120ms) PV Max Molina:     1.0 m/s  (0.6-0.9m/s) PV Max PG:      3.8 mmHg  Pulmonary Veins: PulmV A Revs Dur: 114.19 msec PulmV A Revs Molina: 21.81 cm/s PulmV Robles Molina:   57.26 cm/s PulmV S/D Molina:    0.86 PulmV Sys Molina:    49.18 cm/s  AORTA: Asc Ao Diam 3.29 cm  [id number] Ninfa Pastor MD Electronically signed on 1/3/2025 at 12:11:49 PM  ** Final **     ECG 12 lead    Result Date: 1/3/2025  Normal sinus rhythm Left posterior fascicular block Cannot rule out Anterior infarct (cited on or before 15-ISAK-2024) Abnormal ECG When compared with ECG of 06-SEP-2024 03:33, Left posterior fascicular  block is now Present Inverted T waves have replaced nonspecific T wave abnormality in Inferior leads See ED provider note for full interpretation and clinical correlation Confirmed by Kaye Jay ([id number]) on 1/3/2025 12:44:14 AM    XR chest 1 view    Result Date: 1/2/2025  STUDY: Chest Radiograph;  1/2/2025 12:59PM INDICATION: Shortness of breath. COMPARISON: 9/6/2024 XR Chest. ACCESSION NUMBER(S): [id number] ORDERING CLINICIAN: RENETTA PALMA TECHNIQUE:  Frontal chest was obtained at 1258 hours. FINDINGS: Cardiac silhouette remains enlarged. There is pulmonary vascular congestion bilaterally. Small bibasal pleural effusions with associated atelectasis. No visible pneumothorax.     Constellation of findings is suggestive of congestive heart failure. Signed by Rupesh Borges MD    Point of Care Ultrasound    Result Date: 1/2/2025  Valdo Whatley DO     1/4/2025 11:13 AM Performed by: Valdo Whatley DO Authorized by: Valdo Whatley DO  Respiratory Indications: shortness of breath, tachypnea and hypoxia Procedure: Cardiac Ultrasound Findings:  Views: parasternal long, parasternal short and apical four Effusion: The pericardial space was visualized and was positive for a PERICARDIAL EFFUSION. Activity: Ventricular contractions were visualized. LV: LV systolic function was NORMAL. RV: RV size was NORMAL. Impression: Cardiac: The focused cardiac ultrasound exam had ABNORMAL findings as specified.     BI US breast limited left    Result Date: 12/26/2024  Interpreted By:  Jax Hagan, STUDY: BI US BREAST LIMITED LEFT;  12/26/2024 11:30 am   ACCESSION NUMBER(S): [id number]   ORDERING CLINICIAN: ROGER URBINA   INDICATION: Imaging follow-up to document resolution of a probable hematoma.   ,R92.8 Other abnormal and inconclusive findings on diagnostic imaging of breast   COMPARISON: 11/25/2020   FINDINGS: Targeted ultrasound was performed of the left breast by a registered sonographer with elastography.   The patient  was initially recalled from a screening mammogram for the left breast finding.   Sonographic images were obtained of the left breast 23 cm from the nipple. The previously described finding has since resolved which is consistent with the suspected diagnosis of a hematoma. No new suspicious findings are seen. Elastography was also performed which demonstrates no areas of increased stiffness.       Interval resolution of the previously described finding. Patient can return to screening.   BI-RADS CATEGORY: BI-RADS Category:  1 Negative. Recommendation:  Annual Screening. Recommended Date:  1 Year. Laterality:  Bilateral.   For any future breast imaging appointments, please call [phone]).     MACRO: None   Signed by: Jax Hagan 12/26/2024 11:36 AM Dictation workstation:   [id number]       LDA:   NUTRITION: Adult diet Consistent Carb, 2-3 grams sodium; CCD 60 gm/meal; 2000 mL fluid  EMERGENCY CONTACT: Extended Emergency Contact Information  Primary Emergency Contact: Brandon Rousseau  Mobile Phone: [phone]  Relation: Significant Other  Secondary Emergency Contact: Kimberly Abbott  Mobile Phone: [phone]  Relation: Daughter   needed? No  CODE STATUS: Full Code  DISPO: Discharge Planning  Living Arrangements: Spouse/significant other  Support Systems: Spouse/significant other  Assistance Needed: None  Type of Residence: Private residence  Number of Stairs to Enter Residence: 5  Number of Stairs Within Residence: 0  Do you have animals or pets at home?: Yes  Type of Animals or Pets: dog  Who is requesting discharge planning?: Provider  Home or Post Acute Services: None  Type of Home Care Services: Home nursing visits  Expected Discharge Disposition: Home  Does the patient need discharge transport arranged?: No  AMPAC: Daily Activity - Total Score: 24    FOLLOWUP:   Future Appointments   Date Time Provider Department Center   1/14/2025  2:20 PM Amanda Joe, PharmD [id number] [facility]    1/17/2025 10:00 AM Marylin Sparks MD MPH [id number] [facility]   1/20/2025 11:30 AM Marcin Motta MD [id number] [facility]   2/5/2025  9:45 AM Barrera Rodriguez MD [id number] [facility]   2/20/2025 10:30 AM Mone Aquino MD [id number] [facility]

## 2025-01-07 NOTE — PROGRESS NOTES
Subjective Data:   - continues to endorse chest tightness, which has improved since admission, worse while ambulating  - Cr 3.75 (2.93)  - hold  empa 10mg, hold lasix 40mg BID, diuretic holiday today   - appears volume up on exam   - NPO at midnight for RHC     Overnight Events:  No acute events overnight.     Objective Data:  Last Recorded Vitals:  Vitals:    01/06/25 2129 01/07/25 0140 01/07/25 0517 01/07/25 0833   BP: 137/76 137/65 155/76 157/73   BP Location: Right arm Right arm Right arm    Patient Position: Lying Lying Lying    Pulse: 92 85 81 86   Resp: 18 18 18    Temp: 37 °C (98.6 °F) 36.7 °C (98.1 °F) 36.8 °C (98.2 °F)    TempSrc: Temporal Temporal Temporal    SpO2: 95% 93% 91%    Weight:  107 kg (235 lb 9.6 oz)     Height:         Last Labs:  CBC - 1/6/2025:  8:39 PM  6.5 9.9 335    30.8      CMP - 1/6/2025:  8:39 PM  8.8 6.9 14 --- 0.4   5.1 3.4 12 114      PTT - 4/15/2024:  9:28 PM  0.9   10.3 31     TROPHS   Date/Time Value Ref Range Status   01/02/2025 12:53 PM 21 0 - 34 ng/L Final   02/03/2024 01:22 AM 12 0 - 34 ng/L Final   01/24/2024 08:32 PM 25 0 - 34 ng/L Final   01/25/2023 04:58 PM 7 0 - 34 ng/L Final     Comment:     .  Less than 99th percentile of normal range cutoff-  Female and children under 18 years old <35 ng/L; Male <54 ng/L: Negative  Repeat testing should be performed if clinically indicated.   .  Female and children under 18 years old  ng/L; Male  ng/L:  Consistent with possible cardiac damage and possible increased clinical   risk. Serial measurements may help to assess extent of myocardial damage.   .  >120 ng/L: Consistent with cardiac damage, increased clinical risk and  myocardial infarction. Serial measurements may help assess extent of   myocardial damage.   .   NOTE: Children less than 1 year old may have higher baseline troponin   levels and results should be interpreted in conjunction with the overall   clinical context.  .  NOTE: Troponin I testing is performed  using a different   testing methodology at [facility] than at other   [facility]. Direct result comparisons should only   be made within the same method.     01/25/2023 04:16 PM 8 0 - 34 ng/L Final     Comment:     .  Less than 99th percentile of normal range cutoff-  Female and children under 18 years old <35 ng/L; Male <54 ng/L: Negative  Repeat testing should be performed if clinically indicated.   .  Female and children under 18 years old  ng/L; Male  ng/L:  Consistent with possible cardiac damage and possible increased clinical   risk. Serial measurements may help to assess extent of myocardial damage.   .  >120 ng/L: Consistent with cardiac damage, increased clinical risk and  myocardial infarction. Serial measurements may help assess extent of   myocardial damage.   .   NOTE: Children less than 1 year old may have higher baseline troponin   levels and results should be interpreted in conjunction with the overall   clinical context.  .  NOTE: Troponin I testing is performed using a different   testing methodology at [facility] than at other   [facility]. Direct result comparisons should only   be made within the same method.       BNP   Date/Time Value Ref Range Status   01/04/2025 09:19  0 - 99 pg/mL Final   01/02/2025 12:53  0 - 99 pg/mL Final     HGBA1C   Date/Time Value Ref Range Status   01/02/2025 06:35 PM 8.7 See comment % Final   11/19/2024 02:06 PM 9 4.2 - 6.5 % Final   07/19/2024 08:52 AM 11.2 4.2 - 6.5 % Final     VLDL   Date/Time Value Ref Range Status   05/08/2023 09:35 AM 38 0 - 40 mg/dL Final   04/12/2021 09:06 AM 21 0 - 40 mg/dL Final   10/20/2020 12:36 PM 24 0 - 40 mg/dL Final      Last I/O:  I/O last 3 completed shifts:  In: 1320 (12.4 mL/kg) [P.O.:1320]  Out: 1725 (16.1 mL/kg) [Urine:1725 (0.4 mL/kg/hr)]  Weight: 106.9 kg     Past Cardiology Tests (Last 3 Years):    Echo:  Transthoracic Echo (TTE) Complete 01/25/2024      PHYSICIAN INTERPRETATION:  Left Ventricle: The left ventricular systolic function is hyperdynamic, with an estimated ejection fraction of 70-75%. There are no regional wall motion abnormalities. The left ventricular cavity size is normal. The left ventricular septal wall thickness is mildly increased. There is mild concentric left ventricular hypertrophy. Spectral Doppler shows a normal pattern of left ventricular diastolic filling.  Left Atrium: The left atrium is mildly dilated.  Right Ventricle: The right ventricle was not well visualized. There is normal right ventricular global systolic function.  Right Atrium: The right atrium is normal in size.  Aortic Valve: The aortic valve is trileaflet. There is no evidence of aortic valve regurgitation. The peak instantaneous gradient of the aortic valve is 10.0 mmHg. The mean gradient of the aortic valve is 5.0 mmHg.  Mitral Valve: The mitral valve is normal in structure. There is trace to mild mitral valve regurgitation.  Tricuspid Valve: The tricuspid valve is structurally normal. There is trace to mild tricuspid regurgitation. The Doppler estimated RVSP is within normal limits at 32.4 mmHg.  Pulmonic Valve: The pulmonic valve is structurally normal. There is physiologic pulmonic valve regurgitation.  Pericardium: There is a trivial pericardial effusion.  Pleural: There is left pleural effusion.  Aorta: The aortic root is normal.    CONCLUSIONS:  1. Left ventricular systolic function is hyperdynamic with a 70-75% estimated ejection fraction.  2. RVSP within normal limits.    ** Final **    Transthoracic Echo (TTE) Complete 01/3/2025  CONCLUSIONS:   1. Left ventricular ejection fraction is normal, calculated by Argueta's biplane at 69%.   2. Spectral Doppler shows a Grade II (pseudonormal pattern) of left ventricular diastolic filling with an elevated left atrial pressure.   3. There is normal right ventricular global systolic function.   4. The left atrium is  moderately dilated.   5. Moderate mitral valve regurgitation.   6. Mechanism of MR Is possibly restricted motion of the posterior leaflet.   7. Moderate tricuspid regurgitation visualized.   8. Moderately elevated right ventricular systolic pressure.   9. Small pericardial effusion.  10. Compared with study dated 1/25/2024, the degree of MR appears moderate today rather than mild on prior study. TR is also worsened.    Inpatient Medications:  Scheduled medications   Medication Dose Route Frequency    amLODIPine  10 mg oral Daily    aspirin  81 mg oral Daily    atorvastatin  40 mg oral Nightly    [Held by provider] empagliflozin  10 mg oral Daily    folic acid  1 mg oral Daily    [Held by provider] furosemide  40 mg oral BID AC    gabapentin  300 mg oral BID    heparin (porcine)  7,500 Units subcutaneous q8h NEHEMIAS    hydrALAZINE  50 mg oral TID    insulin glargine  16 Units subcutaneous Daily    insulin lispro  0-10 Units subcutaneous TID AC    isosorbide dinitrate  10 mg oral TID    polyethylene glycol  17 g oral Daily    vitamin B complex-vitamin C-folic acid  1 capsule oral Daily     PRN medications   Medication    acetaminophen    albuterol    dextrose    dextrose    glucagon    glucagon    hydrALAZINE    oxygen    oxygen     Continuous Medications   Medication Dose Last Rate     Physical Exam:  General: NAD, alert, uncomfortable appearing (reported headache)  Skin: warm and dry throughout, no abrasions or ecchymosis  Head/ neck: unable to appreciate JVD due to body positioning  Cardiac: RRR, no murmurs or rubs  Pulm: diminished bases, on room air   GI: soft, nontender, non-distended, +BS  Extremities: trace BL LE edema, warm throughout   Neuro: no focal neuro deficits, A&Ox3  Psych: appropriate mood and behavior, pleasant       Assessment/Plan   Nuris Suqires is a 59 y.o. female presenting with PMH of poorly controlled insulin dependent type 2 diabetes c/b neuropathy, hyperlipidemia, PAD, CKD stage IV,  hypertension, multiple foot infections s/p partial R great toe amputation, fall w/L femur fracture s/p ORIF (4/2024) that presents to the emergency department today for shortness of breath x1 day. Admitted under HHVI Service for further management of acute ADHF and HTN Urgency.     Acute diastolic heart failure  Chest tightness, resolved  - likely 2/2 uncontrolled HTN  - TTE (1/2024): EF 70-75%  - TTE 1/3/2025: EF 69%, grade II diastolic filling, moderate MR, moderate TR, small pericardial effusion  - neg stress 10/2019  - CXR: pulm vascular congestion BL, small bibasilar pleural effusions  -  (ISO obesity) -> repeat  1/5  - HS trop 21, no ischemic changes on ECG, no c/f ACS  - dry weight ~100kg  - admit weight: 107kg  - daily weight: pending (106 kg)  - of note, pharmacy verified patient on PO lasix 20mg daily at home (?for unclear reasons, presumably prescribed by PCP for leg swelling)-> per patient, was just started on it at home  - s/p diuresis w/IVP Lasix boluses--->  - 1/6 transition to PO lasix 40mg BID iso Cr bump and downtrending BNP,started empa 10mg daily   - hold empa lasix with Cr 3.75 from 2.93  - NPO at midnight for RHC   - continue hydralazine 50 mg TID,  isordil 10 mg TID (tolerating well)  - Unable to add robyn/ SGLTII due to current renal function  - Daily standing weights, strict I&O's, 2g sodium diet, 2L fluid restriction      Acute hypoxic respiratory failure iso of ADHF exacerbation, improving  - p.ox initially at 61% but improved rapidly with placement of nasal cannula  - She was requiring 6 L of oxygen, BiPAP briefly due to the concern for SCAPE, received SL nitro and she was able to wean off of BiPAP  - Flu A/B/RSV/COVID negative  - diuresis plan as above  - weaned to RA (1/5)     HTN urgency, improving  - BP on on admit: 220/98  - SBP improved to 143 after resumption of home meds and s/p IVP Hydral 5mg x1  - last 24 hour SBP range: 107-157  - BPs labile, clonidine weaned off  on 1/5, intermittent headaches w/isordil (tolerating 10mg TID)  - cont. BP meds as above, home amlodipine 10mg daily     CKD IV, worsening  - Cr last year 1.7-5.3, worsening since 2022  - admit Cr 2.34  - daily Cr 3.75 (2.93,2.74, 2.55, 2.49, 2.31)  - likely cardiorenal 2/2 ADHF  - diuresis plan as above    Insulin dependent Type 2 diabetes  Poorly controlled  - Follow with clinical pharmacy closely/PCP  - Hgb A1c 8.7% (improved from previous 11.9%)  - home regimen: Mounjaro 2.5mg weekly, basaglar 32 units every morning, admelog SS with meals  - c/w lantus 16 units every morning (decreased from 32U iso AM BG of 62) and SSI #2 while inpatient  - start empa (as above)  - OARRS reviewed, cont. Home gabapentin 300mg BID      HLD  PAD  - c/w asa 81mg daily, atorva 40mg daily        DVT ppx: subQ heparin  Emergency contacts: Boyfrienterrie Rousseau #[phone]  Brother Luis Squires #[phone]  DISPO: previously received HHC w/[facility]; likely resume at discharge   - HF navigator following, HF care established here at   - discharge home pending improvement in Cr, volume optimization     All labs, vital signs, tests & imaging results, and medications were reviewed.     Patient seen and discussed with Dr. Mckee    Code Status:  Full Code    Rich Sawyer, APRN-CNP, DNP   Private car

## 2025-01-08 LAB
ALBUMIN SERPL BCP-MCNC: 3.2 G/DL (ref 3.4–5)
ANION GAP SERPL CALC-SCNC: 14 MMOL/L (ref 10–20)
BUN SERPL-MCNC: 60 MG/DL (ref 6–23)
CALCIUM SERPL-MCNC: 8.7 MG/DL (ref 8.6–10.6)
CHLORIDE SERPL-SCNC: 104 MMOL/L (ref 98–107)
CHLORIDE UR-SCNC: 75 MMOL/L
CHLORIDE/CREATININE (MMOL/G) IN URINE: 205 MMOL/G CREAT (ref 38–318)
CO2 SERPL-SCNC: 26 MMOL/L (ref 21–32)
CREAT SERPL-MCNC: 3.73 MG/DL (ref 0.5–1.05)
CREAT UR-MCNC: 36.6 MG/DL (ref 20–320)
EGFRCR SERPLBLD CKD-EPI 2021: 13 ML/MIN/1.73M*2
ERYTHROCYTE [DISTWIDTH] IN BLOOD BY AUTOMATED COUNT: 14.1 % (ref 11.5–14.5)
GLUCOSE BLD MANUAL STRIP-MCNC: 137 MG/DL (ref 74–99)
GLUCOSE BLD MANUAL STRIP-MCNC: 161 MG/DL (ref 74–99)
GLUCOSE BLD MANUAL STRIP-MCNC: 182 MG/DL (ref 74–99)
GLUCOSE BLD MANUAL STRIP-MCNC: 199 MG/DL (ref 74–99)
GLUCOSE SERPL-MCNC: 165 MG/DL (ref 74–99)
HCT VFR BLD AUTO: 30.5 % (ref 36–46)
HGB BLD-MCNC: 9.4 G/DL (ref 12–16)
MAGNESIUM SERPL-MCNC: 2.32 MG/DL (ref 1.6–2.4)
MCH RBC QN AUTO: 29.5 PG (ref 26–34)
MCHC RBC AUTO-ENTMCNC: 30.8 G/DL (ref 32–36)
MCV RBC AUTO: 96 FL (ref 80–100)
NRBC BLD-RTO: 0 /100 WBCS (ref 0–0)
OSMOLALITY UR: 276 MOSM/KG (ref 200–1200)
PHOSPHATE SERPL-MCNC: 6.1 MG/DL (ref 2.5–4.9)
PLATELET # BLD AUTO: 326 X10*3/UL (ref 150–450)
POTASSIUM SERPL-SCNC: 4.2 MMOL/L (ref 3.5–5.3)
POTASSIUM UR-SCNC: 14 MMOL/L
POTASSIUM/CREAT UR-RTO: 38 MMOL/G CREAT
RBC # BLD AUTO: 3.19 X10*6/UL (ref 4–5.2)
SODIUM SERPL-SCNC: 140 MMOL/L (ref 136–145)
SODIUM UR-SCNC: 74 MMOL/L
SODIUM/CREAT UR-RTO: 202 MMOL/G CREAT
WBC # BLD AUTO: 6.7 X10*3/UL (ref 4.4–11.3)

## 2025-01-08 PROCEDURE — 2500000004 HC RX 250 GENERAL PHARMACY W/ HCPCS (ALT 636 FOR OP/ED): Performed by: INTERNAL MEDICINE

## 2025-01-08 PROCEDURE — C1894 INTRO/SHEATH, NON-LASER: HCPCS | Performed by: INTERNAL MEDICINE

## 2025-01-08 PROCEDURE — 83935 ASSAY OF URINE OSMOLALITY: CPT | Performed by: NURSE PRACTITIONER

## 2025-01-08 PROCEDURE — 2500000002 HC RX 250 W HCPCS SELF ADMINISTERED DRUGS (ALT 637 FOR MEDICARE OP, ALT 636 FOR OP/ED)

## 2025-01-08 PROCEDURE — 82436 ASSAY OF URINE CHLORIDE: CPT | Performed by: NURSE PRACTITIONER

## 2025-01-08 PROCEDURE — 85027 COMPLETE CBC AUTOMATED: CPT | Performed by: NURSE PRACTITIONER

## 2025-01-08 PROCEDURE — B2141ZZ FLUOROSCOPY OF RIGHT HEART USING LOW OSMOLAR CONTRAST: ICD-10-PCS | Performed by: INTERNAL MEDICINE

## 2025-01-08 PROCEDURE — 4A023N6 MEASUREMENT OF CARDIAC SAMPLING AND PRESSURE, RIGHT HEART, PERCUTANEOUS APPROACH: ICD-10-PCS | Performed by: INTERNAL MEDICINE

## 2025-01-08 PROCEDURE — 2500000005 HC RX 250 GENERAL PHARMACY W/O HCPCS: Performed by: INTERNAL MEDICINE

## 2025-01-08 PROCEDURE — 99152 MOD SED SAME PHYS/QHP 5/>YRS: CPT | Performed by: INTERNAL MEDICINE

## 2025-01-08 PROCEDURE — 93451 RIGHT HEART CATH: CPT | Performed by: INTERNAL MEDICINE

## 2025-01-08 PROCEDURE — 99153 MOD SED SAME PHYS/QHP EA: CPT | Performed by: INTERNAL MEDICINE

## 2025-01-08 PROCEDURE — 36415 COLL VENOUS BLD VENIPUNCTURE: CPT | Performed by: NURSE PRACTITIONER

## 2025-01-08 PROCEDURE — C1769 GUIDE WIRE: HCPCS | Performed by: INTERNAL MEDICINE

## 2025-01-08 PROCEDURE — C1727 CATH, BAL TIS DIS, NON-VAS: HCPCS | Performed by: INTERNAL MEDICINE

## 2025-01-08 PROCEDURE — 1200000002 HC GENERAL ROOM WITH TELEMETRY DAILY

## 2025-01-08 PROCEDURE — 82947 ASSAY GLUCOSE BLOOD QUANT: CPT

## 2025-01-08 PROCEDURE — 2500000002 HC RX 250 W HCPCS SELF ADMINISTERED DRUGS (ALT 637 FOR MEDICARE OP, ALT 636 FOR OP/ED): Performed by: NURSE PRACTITIONER

## 2025-01-08 PROCEDURE — 2500000001 HC RX 250 WO HCPCS SELF ADMINISTERED DRUGS (ALT 637 FOR MEDICARE OP): Performed by: STUDENT IN AN ORGANIZED HEALTH CARE EDUCATION/TRAINING PROGRAM

## 2025-01-08 PROCEDURE — 2500000001 HC RX 250 WO HCPCS SELF ADMINISTERED DRUGS (ALT 637 FOR MEDICARE OP)

## 2025-01-08 PROCEDURE — 2500000004 HC RX 250 GENERAL PHARMACY W/ HCPCS (ALT 636 FOR OP/ED): Performed by: NURSE PRACTITIONER

## 2025-01-08 PROCEDURE — 80069 RENAL FUNCTION PANEL: CPT | Performed by: NURSE PRACTITIONER

## 2025-01-08 PROCEDURE — 2720000007 HC OR 272 NO HCPCS: Performed by: INTERNAL MEDICINE

## 2025-01-08 PROCEDURE — 83735 ASSAY OF MAGNESIUM: CPT | Performed by: NURSE PRACTITIONER

## 2025-01-08 PROCEDURE — 76937 US GUIDE VASCULAR ACCESS: CPT | Performed by: INTERNAL MEDICINE

## 2025-01-08 PROCEDURE — 2500000001 HC RX 250 WO HCPCS SELF ADMINISTERED DRUGS (ALT 637 FOR MEDICARE OP): Performed by: NURSE PRACTITIONER

## 2025-01-08 PROCEDURE — 99232 SBSQ HOSP IP/OBS MODERATE 35: CPT | Performed by: STUDENT IN AN ORGANIZED HEALTH CARE EDUCATION/TRAINING PROGRAM

## 2025-01-08 RX ORDER — MIDAZOLAM HYDROCHLORIDE 1 MG/ML
INJECTION, SOLUTION INTRAMUSCULAR; INTRAVENOUS AS NEEDED
Status: DISCONTINUED | OUTPATIENT
Start: 2025-01-08 | End: 2025-01-08 | Stop reason: HOSPADM

## 2025-01-08 RX ORDER — FUROSEMIDE 10 MG/ML
80 INJECTION INTRAMUSCULAR; INTRAVENOUS ONCE
Status: COMPLETED | OUTPATIENT
Start: 2025-01-08 | End: 2025-01-08

## 2025-01-08 RX ORDER — LIDOCAINE HYDROCHLORIDE 20 MG/ML
INJECTION, SOLUTION INFILTRATION; PERINEURAL AS NEEDED
Status: DISCONTINUED | OUTPATIENT
Start: 2025-01-08 | End: 2025-01-08 | Stop reason: HOSPADM

## 2025-01-08 RX ORDER — FENTANYL CITRATE 50 UG/ML
INJECTION, SOLUTION INTRAMUSCULAR; INTRAVENOUS AS NEEDED
Status: DISCONTINUED | OUTPATIENT
Start: 2025-01-08 | End: 2025-01-08 | Stop reason: HOSPADM

## 2025-01-08 RX ADMIN — ASPIRIN 81 MG: 81 TABLET, CHEWABLE ORAL at 09:40

## 2025-01-08 RX ADMIN — HYDRALAZINE HYDROCHLORIDE 50 MG: 50 TABLET ORAL at 09:40

## 2025-01-08 RX ADMIN — ISOSORBIDE DINITRATE 10 MG: 10 TABLET ORAL at 19:04

## 2025-01-08 RX ADMIN — ISOSORBIDE DINITRATE 10 MG: 10 TABLET ORAL at 09:47

## 2025-01-08 RX ADMIN — POLYETHYLENE GLYCOL 3350 17 G: 17 POWDER, FOR SOLUTION ORAL at 09:48

## 2025-01-08 RX ADMIN — ACETAMINOPHEN 650 MG: 325 TABLET ORAL at 06:40

## 2025-01-08 RX ADMIN — AMLODIPINE BESYLATE 10 MG: 10 TABLET ORAL at 09:40

## 2025-01-08 RX ADMIN — INSULIN LISPRO 2 UNITS: 100 INJECTION, SOLUTION INTRAVENOUS; SUBCUTANEOUS at 09:48

## 2025-01-08 RX ADMIN — HYDRALAZINE HYDROCHLORIDE 50 MG: 50 TABLET ORAL at 20:36

## 2025-01-08 RX ADMIN — FUROSEMIDE 80 MG: 10 INJECTION, SOLUTION INTRAVENOUS at 09:57

## 2025-01-08 RX ADMIN — GABAPENTIN 300 MG: 300 CAPSULE ORAL at 09:39

## 2025-01-08 RX ADMIN — INSULIN GLARGINE 16 UNITS: 100 INJECTION, SOLUTION SUBCUTANEOUS at 09:48

## 2025-01-08 RX ADMIN — HEPARIN SODIUM 7500 UNITS: 5000 INJECTION INTRAVENOUS; SUBCUTANEOUS at 22:18

## 2025-01-08 RX ADMIN — HEPARIN SODIUM 7500 UNITS: 5000 INJECTION INTRAVENOUS; SUBCUTANEOUS at 06:26

## 2025-01-08 RX ADMIN — ATORVASTATIN CALCIUM 40 MG: 40 TABLET, FILM COATED ORAL at 20:37

## 2025-01-08 RX ADMIN — INSULIN LISPRO 2 UNITS: 100 INJECTION, SOLUTION INTRAVENOUS; SUBCUTANEOUS at 14:51

## 2025-01-08 RX ADMIN — ISOSORBIDE DINITRATE 10 MG: 10 TABLET ORAL at 14:51

## 2025-01-08 RX ADMIN — NEPHROCAP 1 CAPSULE: 1 CAP ORAL at 09:39

## 2025-01-08 RX ADMIN — GABAPENTIN 300 MG: 300 CAPSULE ORAL at 20:37

## 2025-01-08 RX ADMIN — HYDRALAZINE HYDROCHLORIDE 50 MG: 50 TABLET ORAL at 14:51

## 2025-01-08 RX ADMIN — FOLIC ACID 1 MG: 1 TABLET ORAL at 09:39

## 2025-01-08 RX ADMIN — HEPARIN SODIUM 7500 UNITS: 5000 INJECTION INTRAVENOUS; SUBCUTANEOUS at 14:51

## 2025-01-08 ASSESSMENT — COGNITIVE AND FUNCTIONAL STATUS - GENERAL
DRESSING REGULAR LOWER BODY CLOTHING: A LITTLE
MOVING TO AND FROM BED TO CHAIR: A LITTLE
DAILY ACTIVITIY SCORE: 23
MOVING TO AND FROM BED TO CHAIR: A LITTLE
CLIMB 3 TO 5 STEPS WITH RAILING: A LITTLE
MOBILITY SCORE: 22
DRESSING REGULAR LOWER BODY CLOTHING: A LITTLE
CLIMB 3 TO 5 STEPS WITH RAILING: A LITTLE
MOBILITY SCORE: 22
DAILY ACTIVITIY SCORE: 23

## 2025-01-08 ASSESSMENT — PAIN - FUNCTIONAL ASSESSMENT
PAIN_FUNCTIONAL_ASSESSMENT: 0-10
PAIN_FUNCTIONAL_ASSESSMENT: 0-10

## 2025-01-08 ASSESSMENT — PAIN SCALES - GENERAL
PAINLEVEL_OUTOF10: 0 - NO PAIN
PAINLEVEL_OUTOF10: 3
PAINLEVEL_OUTOF10: 0 - NO PAIN

## 2025-01-08 ASSESSMENT — PAIN DESCRIPTION - LOCATION: LOCATION: HEAD

## 2025-01-08 NOTE — PROGRESS NOTES
Subjective Data:   - continues to endorse chest tightness, which has improved since admission, worse while ambulating  - Cr 3.82 (3.75,2.93)  - RHC done, reviewed tracings with Dr. Mckee, numbers are questionable but RVEDP 20, PA 75/25, unclear wedge  - will give 80mg IV lasix and assess for response  - urine lytes, osm, Cr pending     Overnight Events:  No acute events overnight.     Objective Data:  Last Recorded Vitals:  Vitals:    01/08/25 0856 01/08/25 0915 01/08/25 0930 01/08/25 1001   BP: 145/77 122/60 134/63 116/73   BP Location: Left arm Left arm Left arm Left arm   Patient Position: Lying Lying Lying Lying   Pulse: 74 74 74 71   Resp: 16 21 14 17   Temp: 36.1 °C (97 °F)      TempSrc: Temporal      SpO2: 92%      Weight:       Height:         Last Labs:  CBC - 1/7/2025:  7:30 PM  5.9 9.9 330    32.1      CMP - 1/7/2025:  7:30 PM  9.1 6.9 14 --- 0.4   5.4 3.6 12 114      PTT - 4/15/2024:  9:28 PM  0.9   10.3 31     TROPHS   Date/Time Value Ref Range Status   01/02/2025 12:53 PM 21 0 - 34 ng/L Final   02/03/2024 01:22 AM 12 0 - 34 ng/L Final   01/24/2024 08:32 PM 25 0 - 34 ng/L Final   01/25/2023 04:58 PM 7 0 - 34 ng/L Final     Comment:     .  Less than 99th percentile of normal range cutoff-  Female and children under 18 years old <35 ng/L; Male <54 ng/L: Negative  Repeat testing should be performed if clinically indicated.   .  Female and children under 18 years old  ng/L; Male  ng/L:  Consistent with possible cardiac damage and possible increased clinical   risk. Serial measurements may help to assess extent of myocardial damage.   .  >120 ng/L: Consistent with cardiac damage, increased clinical risk and  myocardial infarction. Serial measurements may help assess extent of   myocardial damage.   .   NOTE: Children less than 1 year old may have higher baseline troponin   levels and results should be interpreted in conjunction with the overall   clinical context.  .  NOTE: Troponin I  testing is performed using a different   testing methodology at [facility] than at other   [facility]. Direct result comparisons should only   be made within the same method.     01/25/2023 04:16 PM 8 0 - 34 ng/L Final     Comment:     .  Less than 99th percentile of normal range cutoff-  Female and children under 18 years old <35 ng/L; Male <54 ng/L: Negative  Repeat testing should be performed if clinically indicated.   .  Female and children under 18 years old  ng/L; Male  ng/L:  Consistent with possible cardiac damage and possible increased clinical   risk. Serial measurements may help to assess extent of myocardial damage.   .  >120 ng/L: Consistent with cardiac damage, increased clinical risk and  myocardial infarction. Serial measurements may help assess extent of   myocardial damage.   .   NOTE: Children less than 1 year old may have higher baseline troponin   levels and results should be interpreted in conjunction with the overall   clinical context.  .  NOTE: Troponin I testing is performed using a different   testing methodology at [facility] than at other   [facility]. Direct result comparisons should only   be made within the same method.       BNP   Date/Time Value Ref Range Status   01/04/2025 09:19  0 - 99 pg/mL Final   01/02/2025 12:53  0 - 99 pg/mL Final     HGBA1C   Date/Time Value Ref Range Status   01/02/2025 06:35 PM 8.7 See comment % Final   11/19/2024 02:06 PM 9 4.2 - 6.5 % Final   07/19/2024 08:52 AM 11.2 4.2 - 6.5 % Final     VLDL   Date/Time Value Ref Range Status   05/08/2023 09:35 AM 38 0 - 40 mg/dL Final   04/12/2021 09:06 AM 21 0 - 40 mg/dL Final   10/20/2020 12:36 PM 24 0 - 40 mg/dL Final      Last I/O:  I/O last 3 completed shifts:  In: 1710 (16 mL/kg) [P.O.:1710]  Out: 800 (7.5 mL/kg) [Urine:800 (0.2 mL/kg/hr)]  Weight: 106.9 kg     Past Cardiology Tests (Last 3 Years):    Echo:  Transthoracic Echo (TTE) Complete  01/25/2024     PHYSICIAN INTERPRETATION:  Left Ventricle: The left ventricular systolic function is hyperdynamic, with an estimated ejection fraction of 70-75%. There are no regional wall motion abnormalities. The left ventricular cavity size is normal. The left ventricular septal wall thickness is mildly increased. There is mild concentric left ventricular hypertrophy. Spectral Doppler shows a normal pattern of left ventricular diastolic filling.  Left Atrium: The left atrium is mildly dilated.  Right Ventricle: The right ventricle was not well visualized. There is normal right ventricular global systolic function.  Right Atrium: The right atrium is normal in size.  Aortic Valve: The aortic valve is trileaflet. There is no evidence of aortic valve regurgitation. The peak instantaneous gradient of the aortic valve is 10.0 mmHg. The mean gradient of the aortic valve is 5.0 mmHg.  Mitral Valve: The mitral valve is normal in structure. There is trace to mild mitral valve regurgitation.  Tricuspid Valve: The tricuspid valve is structurally normal. There is trace to mild tricuspid regurgitation. The Doppler estimated RVSP is within normal limits at 32.4 mmHg.  Pulmonic Valve: The pulmonic valve is structurally normal. There is physiologic pulmonic valve regurgitation.  Pericardium: There is a trivial pericardial effusion.  Pleural: There is left pleural effusion.  Aorta: The aortic root is normal.    CONCLUSIONS:  1. Left ventricular systolic function is hyperdynamic with a 70-75% estimated ejection fraction.  2. RVSP within normal limits.    ** Final **    Transthoracic Echo (TTE) Complete 01/3/2025  CONCLUSIONS:   1. Left ventricular ejection fraction is normal, calculated by Argueta's biplane at 69%.   2. Spectral Doppler shows a Grade II (pseudonormal pattern) of left ventricular diastolic filling with an elevated left atrial pressure.   3. There is normal right ventricular global systolic function.   4. The left  atrium is moderately dilated.   5. Moderate mitral valve regurgitation.   6. Mechanism of MR Is possibly restricted motion of the posterior leaflet.   7. Moderate tricuspid regurgitation visualized.   8. Moderately elevated right ventricular systolic pressure.   9. Small pericardial effusion.  10. Compared with study dated 1/25/2024, the degree of MR appears moderate today rather than mild on prior study. TR is also worsened.    Inpatient Medications:  Scheduled medications   Medication Dose Route Frequency    amLODIPine  10 mg oral Daily    aspirin  81 mg oral Daily    atorvastatin  40 mg oral Nightly    [Held by provider] empagliflozin  10 mg oral Daily    folic acid  1 mg oral Daily    gabapentin  300 mg oral BID    heparin (porcine)  7,500 Units subcutaneous q8h NEHEMIAS    hydrALAZINE  50 mg oral TID    insulin glargine  16 Units subcutaneous Daily    insulin lispro  0-10 Units subcutaneous TID AC    isosorbide dinitrate  10 mg oral TID    polyethylene glycol  17 g oral Daily    vitamin B complex-vitamin C-folic acid  1 capsule oral Daily     PRN medications   Medication    acetaminophen    albuterol    dextrose    dextrose    glucagon    glucagon    hydrALAZINE    oxygen    oxygen     Continuous Medications   Medication Dose Last Rate     Physical Exam:  General: NAD, alert, uncomfortable appearing (reported headache)  Skin: warm and dry throughout, no abrasions or ecchymosis  Head/ neck: unable to appreciate JVD due to body positioning  Cardiac: RRR, no murmurs or rubs  Pulm: diminished bases, on room air   GI: soft, nontender, non-distended, +BS  Extremities: trace BL LE edema, warm throughout   Neuro: no focal neuro deficits, A&Ox3  Psych: appropriate mood and behavior, pleasant       Assessment/Plan   Nuris Squires is a 59 y.o. female presenting with PMH of poorly controlled insulin dependent type 2 diabetes c/b neuropathy, hyperlipidemia, PAD, CKD stage IV, hypertension, multiple foot infections s/p partial R  great toe amputation, fall w/L femur fracture s/p ORIF (4/2024) that presents to the emergency department today for shortness of breath x1 day. Admitted under [facility] Service for further management of acute ADHF and HTN Urgency.     Acute diastolic heart failure  Chest tightness, resolved  - likely 2/2 uncontrolled HTN  - TTE (1/2024): EF 70-75%  - TTE 1/3/2025: EF 69%, grade II diastolic filling, moderate MR, moderate TR, small pericardial effusion  - neg stress 10/2019  - CXR: pulm vascular congestion BL, small bibasilar pleural effusions  -  (ISO obesity) -> repeat  1/5  - HS trop 21, no ischemic changes on ECG, no c/f ACS  - dry weight ~100kg  - admit weight: 107kg  - daily weight: pending (106 kg)  - of note, pharmacy verified patient on PO lasix 20mg daily at home (?for unclear reasons, presumably prescribed by PCP for leg swelling)-> per patient, was just started on it at home  - s/p diuresis w/IVP Lasix boluses--->  - 1/6 transition to PO lasix 40mg BID iso Cr bump and downtrending BNP,started empa 10mg daily   - 1/7 hold empa lasix with Cr 3.75 from 2.93  - Cr 3.82 today  -  RHC done, reviewed tracings with Dr. Mckee, numbers are questionable but RVEDP 20, PA 75/25, unclear wedge  - will give 80mg IV lasix and assess for response  - continue hydralazine 50 mg TID,  isordil 10 mg TID (tolerating well)  - Unable to add robyn/ SGLTII due to current renal function  - Daily standing weights, strict I&O's, 2g sodium diet, 2L fluid restriction      Acute hypoxic respiratory failure iso of ADHF exacerbation, improving  - p.ox initially at 61% but improved rapidly with placement of nasal cannula  - She was requiring 6 L of oxygen, BiPAP briefly due to the concern for SCAPE, received SL nitro and she was able to wean off of BiPAP  - Flu A/B/RSV/COVID negative  - diuresis plan as above  - weaned to RA (1/5)     HTN urgency, improving  - BP on on admit: 220/98  - SBP improved to 143 after resumption of  home meds and s/p IVP Hydral 5mg x1  - last 24 hour SBP range: 100s-140s, one related 180s post RHC   - BPs labile, clonidine weaned off on 1/5, intermittent headaches w/isordil (tolerating 10mg TID)  - cont. BP meds as above, home amlodipine 10mg daily     CKD IV, worsening  - Cr last year 1.7-5.3, worsening since 2022  - admit Cr 2.34  - daily Cr 3.82 (3.75,2.93,2.74, 2.55, 2.49, 2.31)  - likely cardiorenal 2/2 ADHF  - diuresis plan as above  - urine lytes, osm, Cr pending     Insulin dependent Type 2 diabetes  Poorly controlled  - Follow with clinical pharmacy closely/PCP  - Hgb A1c 8.7% (improved from previous 11.9%)  - home regimen: Mounjaro 2.5mg weekly, basaglar 32 units every morning, admelog SS with meals  - c/w lantus 16 units every morning (decreased from 32U iso AM BG of 62) and SSI #2 while inpatient  - start empa (as above)  - OARRS reviewed, cont. Home gabapentin 300mg BID      HLD  PAD  - c/w asa 81mg daily, atorva 40mg daily        DVT ppx: subQ heparin  Emergency contacts: Boyfrienterrie Rousseau #[phone]  Brother Luis Squires #[phone]  DISPO: previously received HHC w/[facility]; likely resume at discharge   - HF navigator following, HF care established here at   - discharge home pending improvement in Cr, volume optimization     All labs, vital signs, tests & imaging results, and medications were reviewed.     Patient seen and discussed with Dr. Mckee    Code Status:  Full Code    Rich Sawyer, APRN-CNP, DNP

## 2025-01-08 NOTE — PROGRESS NOTES
[facility] Attending Shared Visit Note    This is a shared visit. Please see Advanced Practice Provider's encounter note for additional details.      Briefly, 59-year-old female from [place] with PMH of poorly controlled insulin-dependent T2DM c/b neuropathy, hyperlipidemia, PAD, CKD IV, HTN, multiple foot infections s/p partial R great toe amputation, fall w/ L femur fracture s/p ORIF (4/2024) presenting with SOB and HTN urgency. On presentation, reated with BiPAP, IV Lasix, hydralazine, and isordil. Now weaned to RA, BNP down, BP labile but trending lower. Renal function labile and worsened with diuresis    Echo showed EF 69%, moderate MR/TR, elevated LAP, and small pericardial effusion.   RHC obtained in setting of WRF:  RA 20, RVSP 72, PA 72/24 (40), W: poor waveform, ?24. CO 10.4/5.1.         Exam: Breathing unlabored. Regular. Warm. 1+ edema    Plan:     - Amlodipine: 10 mg PO daily  - Hydralazine: 50 mg PO TID  - Isosorbide dinitrate: 10 mg PO TID (reduced from 20 mg due to headache)  - Spironolactone and SGLT2i: Not initiated due to current renal function (CKD IV)  - Diuretics: resume IV diuresis    Dispo: Discharge pending further HF med optimization and close outpatient follow-up.    Luis Mckee MD    Objective   Admit Date: 1/2/2025  Hospital Length of Stay: 6   Home: [place] [address]    MEDICATIONS  Infusions:     Scheduled:  amLODIPine, 10 mg, Daily  aspirin, 81 mg, Daily  atorvastatin, 40 mg, Nightly  [Held by provider] empagliflozin, 10 mg, Daily  folic acid, 1 mg, Daily  [Held by provider] furosemide, 40 mg, BID AC  gabapentin, 300 mg, BID  heparin (porcine), 7,500 Units, q8h NEHEMIAS  hydrALAZINE, 50 mg, TID  insulin glargine, 16 Units, Daily  insulin lispro, 0-10 Units, TID AC  isosorbide dinitrate, 10 mg, TID  polyethylene glycol, 17 g, Daily  vitamin B complex-vitamin C-folic acid, 1 capsule, Daily      PRN:  acetaminophen, 650 mg, q6h PRN  albuterol, 2.5 mg, q6h PRN  dextrose, 12.5 g, q15 min  PRN  dextrose, 25 g, q15 min PRN  glucagon, 1 mg, q15 min PRN  glucagon, 1 mg, q15 min PRN  hydrALAZINE, 5 mg, q6h PRN  oxygen, , Continuous PRN - O2/gases  oxygen, , Continuous PRN - O2/gases      Prior to Admission Meds:  Medications Prior to Admission   Medication Sig Dispense Refill Last Dose/Taking    acetaminophen (Tylenol) 325 mg tablet Take 2 tablets (650 mg) by mouth every 4 hours if needed for mild pain (1 - 3).   Past Month    albuterol 90 mcg/actuation inhaler Inhale 2 puffs every 6 hours if needed for wheezing. 6.7 g 0 Past Week    b complex vitamins capsule Take 1 capsule by mouth once daily.   1/1/2025    cloNIDine (Catapres) 0.1 mg tablet Take 1 tablet (0.1 mg) by mouth 2 times a day. 180 tablet 0 1/1/2025    folic acid (Folvite) 1 mg tablet Take 1 tablet (1 mg) by mouth once daily.   1/1/2025    gabapentin (Neurontin) 300 mg capsule Take 1 capsule (300 mg) by mouth 2 times a day. 180 capsule 0 1/1/2025    insulin glargine (Basaglar KwikPen U-100 Insulin) 100 unit/mL (3 mL) pen Inject 32 Units under the skin once daily in the morning. Take as directed per insulin instructions. 15 mL 1 12/31/2024    insulin lispro (Admelog SoloStar U-100 Insulin) 100 unit/mL injection Inject per sliding scale instructions under the skin 3 times a day with meals. (Max daily dose 70 units) 15 mL 1 12/31/2024    tirzepatide (Mounjaro) 2.5 mg/0.5 mL pen injector Inject 2.5 mg under the skin every 7 days. 2 mL 0 12/30/2024    [DISCONTINUED] amLODIPine (Norvasc) 10 mg tablet Take 1 tablet (10 mg) by mouth once daily. 90 tablet 0 1/1/2025    [DISCONTINUED] aspirin 81 mg chewable tablet Chew 1 tablet (81 mg) once daily. 90 tablet 0 1/1/2025 Noon    [DISCONTINUED] atorvastatin (Lipitor) 40 mg tablet Take 1 tablet (40 mg) by mouth once daily. 90 tablet 0 1/1/2025    blood-glucose sensor (FreeStyle Remington 3 Sensor) device Use to monitor blood glucose. Change sensor every 14 days. 2 each 11     FreeStyle Remington 3 [place] misc Use as  "instructed to monitor blood glucose. 1 each 0     furosemide (Lasix) 20 mg tablet Take 1 tablet (20 mg) by mouth once daily.       meclizine (Antivert) 25 mg tablet,chewable Chew 1 tablet (25 mg) 3 times a day as needed (vertigo). (Patient not taking: Reported on 1/2/2025) 90 tablet 1 Not Taking    OneTouch Delica Plus Lancet 30 gauge misc USE TO TEST BLOOD SUGAR AS DIRECTED 200 each 0     OneTouch Ultra Test strip Use 1 strip BID to check glucose 200 strip 1     OneTouch Ultra2 Meter misc use 1 to 2 times daily 1 each 0     pen needle, diabetic 32 gauge x 5/32\" needle Use four times a day with insulin use 360 each 1        Vitals:      1/8/2025     8:56 AM 1/8/2025     8:33 AM 1/8/2025     8:00 AM 1/8/2025     5:48 AM 1/8/2025     1:22 AM 1/7/2025     9:45 PM 1/7/2025     5:28 PM   Vitals   Systolic 145 167 186 105 110 149 127   Diastolic 77 64 72 66 60 76 69   BP Location Left arm   Left arm Left arm Left arm Left arm   Heart Rate 74 82 84 78 77 83 73   Temp 36.1 °C (97 °F)   36.3 °C (97.3 °F) 36.5 °C (97.7 °F) 36.8 °C (98.2 °F) 36.7 °C (98.1 °F)   Resp 16 15 19 18 18 18 18   Weight (lb)    --        Wt Readings from Last 5 Encounters:   01/07/25 107 kg (235 lb 9.6 oz)   11/19/24 99.8 kg (220 lb)   11/15/24 99.8 kg (220 lb 0.3 oz)   09/05/24 99.8 kg (220 lb)   09/04/24 102 kg (224 lb)       Intake/Output Summary (Last 24 hours) at 1/8/2025 0915  Last data filed at 1/8/2025 0833  Gross per 24 hour   Intake 990 ml   Output 305 ml   Net 685 ml       CHEST: Unlabored, Clear, Diminished  CV:  Normal sinus rhythm  NEURO:   RASS: Alert and calm  CAM:    LOC: Alert  Cognition: Appropriate judgement  GCS: 15    DATA:  CMP:  Recent Labs     01/07/25  1930 01/06/25  2039 01/05/25 2008 01/04/25  2119 01/04/25  0700 01/03/25 2009 01/02/25  1835 01/02/25  1253 04/24/24  1132 04/23/24  0803 04/19/24  0636    141 135* 141 142 142 144 142   < > 138 135*   K 4.2 4.5 4.6 4.2 4.1 4.2 4.6 4.5   < > 5.0 4.9    104 102 104 " 108* 109* 109* 111*   < > 105 103   CO2 25 23 24 27 27 26 22 23   < > 23 22   ANIONGAP 15 19 14 14 11 11 18 13   < > 15 15   BUN 57* 52* 46* 41* 36* 35* 29* 29*   < > 36* 37*   CREATININE 3.82* 3.75* 2.93* 2.74* 2.55* 2.49* 2.31* 2.34*   < > 1.64* 2.20*   EGFR 13* 13* 18* 19* 21* 22* 24* 23*   < > 36* 25*   MG 2.27 2.10 2.50* 2.04  --  2.06 2.50*  --   --  2.55* 2.49*    < > = values in this interval not displayed.     Recent Labs     01/07/25  1930 01/06/25 2039 01/05/25 2008 01/04/25  0700 01/03/25  2009 01/02/25  1835 01/02/25  1253 09/06/24  0120 04/17/24  0901 04/15/24  2128 02/07/24  0422 02/06/24  0735 02/04/24  0555 02/03/24  0122 01/26/24  1343 01/26/24  0358 01/25/24  1457 01/24/24  2032 01/23/24  1230 01/12/24  0603 01/11/24  0747 01/09/24  1210 08/13/23  1712 07/04/19  0707 04/02/19  1106 02/17/19  1207   ALBUMIN 3.6 3.4 3.1* 2.9* 3.1* 3.9 3.6 3.3*   < > 3.5   < > 2.9*   < > 3.4   < > 2.7*   < > 3.4 4.1   < > 2.5*   < > 3.6   < > 3.9 3.8  3.8   ALT  --   --   --   --   --   --  12 10  --  23  --   --   --  22  --  19  --  25 16  --  6   < > 11   < > 12 10   AST  --   --   --   --   --   --  14 11  --  22  --   --   --  21  --  15  --  28 17  --  8   < > 11   < > 13 12   BILITOT  --   --   --   --   --   --  0.4 0.4  --  0.4  --   --   --  0.4  --  0.2  --  0.4 0.3  --  <0.2   < > 0.4   < > 0.5 0.4   LIPASE  --   --   --   --   --   --   --   --   --   --   --  5*  --   --   --   --   --   --   --   --   --   --  <3*  --  7* 3*    < > = values in this interval not displayed.     CBC:  Recent Labs     01/07/25 1930 01/06/25 2039 01/05/25 2008 01/04/25 2119 01/03/25 2009 01/02/25  1835 01/02/25  1253 09/06/24  0120   WBC 5.9 6.5 6.5 6.9 5.3 5.9 5.4 7.4   HGB 9.9* 9.9* 9.3* 9.8* 9.4* 11.1* 11.1* 10.3*   HCT 32.1* 30.8* 27.9* 31.6* 30.0* 33.3* 33.1* 31.9*    335 298 329 285 369 363 318   MCV 96 93 89 93 95 90 90 88     COAG:   Recent Labs     04/15/24  2128 01/29/24  1217 01/24/24 2032  10/21/19  1709 04/02/19  1106 02/17/19  1207   INR 0.9 1.1 1.2* 1.0 1.0 1.1     ABO:   Recent Labs     04/17/24  0901   ABO A     HEME/ENDO:   Recent Labs     01/02/25  1835 11/19/24  1406 07/19/24  0852 04/16/24  0637 02/03/24  0122 01/26/24  0358 01/24/24 2032 01/18/24  0525 01/15/24  0606 09/14/23  1047 06/14/23  1016 05/08/23  1320 01/27/23  0840 01/26/23  1613   FERRITIN  --   --   --   --   --   --   --  276* 487*  --   --   --   --   --    IRONSAT  --   --   --   --   --   --   --  34 32  --  25  --   --   --    TSH  --   --   --   --  0.46 1.37  --   --   --   --  0.83  --   --  0.27*   FREET4  --   --   --   --   --   --   --   --   --   --   --   --  1.16 1.14   HGBA1C 8.7* 9* 11.2* 11.9*  --   --    < >  --   --    < >  --    < >  --   --     < > = values in this interval not displayed.     CARDIAC:   Recent Labs     01/04/25  2119 01/02/25  1253 02/03/24  0122 01/24/24 2032 01/25/23  1658 01/25/23  1616   TROPHS  --  21 12 25 7 8   * 376* 185* 311* 24  --      Recent Labs     05/08/23  0935 04/12/21  0906 10/20/20  1236   CHOL 174 161 181   LDLF 98 101* 115*   HDL 38.5* 38.9* 41.5   TRIG 189* 104 121     TOX:  Recent Labs     02/03/24  0800   AMPHETAMINE Presumptive Negative   BENZO Presumptive Negative   CANNABINOID Presumptive Negative   COCAI Presumptive Negative   FENTANYL Presumptive Negative   OPIATE Presumptive Negative   OXYCODONE Presumptive Negative   PCP Presumptive Negative     MICRO:   Recent Labs     02/05/24  0446 02/03/24  0953 02/03/24  0122 01/29/24  1217 01/25/24  0819 01/23/24  1230   CRP 1.29*  --  1.86*  --   --  0.21   PROCAL  --  0.05  --  0.05 0.14*  --      No results found for the last 90 days.      EKG:   Recent Labs     01/02/25  1225 09/06/24  0337 04/15/24  2155   ATRRATE 88 81 77   VENTRATE 88 81 77   PRINT 128 120 122   QRSDUR 82 86 86   QTCFRED 416 424 382   QTCCALCB 442 446 398     Encounter Date: 01/02/25   ECG 12 lead   Result Value    Ventricular Rate 88     Atrial Rate 88    AK Interval 128    QRS Duration 82    QT Interval 366    QTC Calculation(Bazett) 442    P Axis 58    R Axis 134    T Axis -1    QRS Count 14    Q Onset 221    P Onset 157    P Offset 214    T Offset 404    QTC Fredericia 416    Narrative    Normal sinus rhythm  Left posterior fascicular block  Cannot rule out Anterior infarct (cited on or before 15-ISAK-2024)  Abnormal ECG  When compared with ECG of 06-SEP-2024 03:33,  Left posterior fascicular block is now Present  Inverted T waves have replaced nonspecific T wave abnormality in Inferior leads    See ED provider note for full interpretation and clinical correlation  Confirmed by Kaye Jay ([id number]) on 1/3/2025 12:44:14 AM     Echocardiogram:   Recent Labs     01/03/25  1010 01/25/24  1100   EF 69  --    LVIDD 4.41 4.35   RV 45.2 32.4   RVFRWALLPKSP 10.00 9.57   TAPSE 2.5 2.0   Transthoracic Echo (TTE) Complete With Contrast 01/03/2025  Left Ventricle: Left ventricular ejection fraction is normal, calculated by Argueta's biplane at 69%. There are no regional left ventricular wall motion abnormalities. The left ventricular cavity size is normal. There is mildly increased septal and mildly increased posterior left ventricular wall thickness. There is left ventricular concentric remodeling. Spectral Doppler shows a Grade II (pseudonormal pattern) of left ventricular diastolic filling with an elevated left atrial pressure.  Left Atrium: The left atrium is moderately dilated.  Right Ventricle: The right ventricle is normal in size. There is normal right ventricular global systolic function.  Right Atrium: The right atrium is mildly dilated.  Aortic Valve: The aortic valve is trileaflet. There is minimal aortic valve cusp calcification. There is no evidence of aortic valve regurgitation. The peak instantaneous gradient of the aortic valve is 11 mmHg.  Mitral Valve: The mitral valve is normal in structure. There is moderate mitral valve regurgitation  which is posteriorly directed. The mitral regurgitant orifice area is 31 mm2. The mitral regurgitant volume is 55.50 ml. Mechanism of MR Is possibly restricted motion of the posterior leaflet.  Tricuspid Valve: The tricuspid valve is structurally normal. There is moderate tricuspid regurgitation. The Doppler estimated RVSP is moderately elevated at 45.2 mmHg.  Pulmonic Valve: The pulmonic valve is structurally normal. There is physiologic pulmonic valve regurgitation.  Pericardium: Small pericardial effusion. The effusion is circumferential.  Pleural: There is a small left pleural effusion.  Aorta: The aortic root is normal. The Ao Sinus is 2.90 cm. The Asc Ao is 3.30 cm.  Systemic Veins: The inferior vena cava appears normal in size, with IVC inspiratory collapse greater than 50%.  In comparison to the previous echocardiogram(s): Compared with study dated 1/25/2024, the degree of MR appears moderate today rather than mild on prior study. TR is also worsened.      CONCLUSIONS:  1. Left ventricular ejection fraction is normal, calculated by Argueta's biplane at 69%.  2. Spectral Doppler shows a Grade II (pseudonormal pattern) of left ventricular diastolic filling with an elevated left atrial pressure.  3. There is normal right ventricular global systolic function.  4. The left atrium is moderately dilated.  5. Moderate mitral valve regurgitation.  6. Mechanism of MR Is possibly restricted motion of the posterior leaflet.  7. Moderate tricuspid regurgitation visualized.  8. Moderately elevated right ventricular systolic pressure.  9. Small pericardial effusion.  10. Compared with study dated 1/25/2024, the degree of MR appears moderate today rather than mild on prior study. TR is also worsened.    QUANTITATIVE DATA SUMMARY:    2D MEASUREMENTS:          Normal Ranges:  LAs:             3.32 cm  (2.7-4.0cm)  RVIDd:           2.76 cm  (0.9-3.6cm)  IVSd:            1.10 cm  (0.6-1.1cm)  LVPWd:           0.96 cm   (0.6-1.1cm)  LVIDd:           4.41 cm  (3.9-5.9cm)  LVIDs:           2.83 cm  LV Mass Index:   76 g/m2  LVEDV Index:     44 ml/m2  LV % FS          35.8 %      LA VOLUME:                    Normal Ranges:  LA Vol A4C:        88.4 ml    (22+/-6mL/m2)  LA Vol A2C:        79.6 ml  LA Vol BP:         85.3 ml  LA Vol Index A4C:  43.1ml/m2  LA Vol Index A2C:  38.8 ml/m2  LA Vol Index BP:   41.6 ml/m2  LA Area A4C:       25.6 cm2  LA Area A2C:       23.9 cm2  LA Major Axis A4C: 6.3 cm  LA Major Axis A2C: 6.1 cm  LA Volume Index:   35.2 ml/m2      RA VOLUME BY A/L METHOD:            Normal Ranges:  RA Vol A4C:              73.5 ml    (8.3-19.5ml)  RA Vol Index A4C:        35.8 ml/m2  RA Area A4C:             21.0 cm2  RA Major Axis A4C:       5.1 cm      AORTA MEASUREMENTS:         Normal Ranges:  Ao Sinus, d:        2.90 cm (2.1-3.5cm)  Asc Ao, d:          3.30 cm (2.1-3.4cm)      LV SYSTOLIC FUNCTION BY 2D PLANIMETRY (MOD):  Normal Ranges:  EF-A4C View:    75 % (>=55%)  EF-A2C View:    66 %  EF-Biplane:     69 %  LV EF Reported: 69 %      LV DIASTOLIC FUNCTION:             Normal Ranges:  MV Peak E:             1.19 m/s    (0.7-1.2 m/s)  MV Peak A:             1.00 m/s    (0.42-0.7 m/s)  E/A Ratio:             1.19        (1.0-2.2)  MV e'                  0.040 m/s   (>8.0)  MV lateral e'          0.05 m/s  MV medial e'           0.03 m/s  MV A Dur:              183.39 msec  E/e' Ratio:            29.68       (<8.0)  PulmV Sys Molina:         49.18 cm/s  PulmV Robles Molina:        57.26 cm/s  PulmV S/D Molina:         0.86  PulmV A Revs Molina:      21.81 cm/s  PulmV A Revs Dur:      114.19 msec      MITRAL VALVE:          Normal Ranges:  MV DT:        232 msec (150-240msec)      MITRAL INSUFFICIENCY:             Normal Ranges:  MR VTI:               180.58 cm  MR Vmax:              523.70 cm/s  MR Volume:            55.50 ml  MR Flow Rt:           160.96 ml/s  MR EROA:              31 mm2      AORTIC VALVE:            Normal  Ranges:  AoV Vmax:      1.68 m/s  (<=1.7m/s)  AoV Peak P.3 mmHg (<20mmHg)  LVOT Max Molina:  1.33 m/s  (<=1.1m/s)  LVOT VTI:      26.11 cm  LVOT Diameter: 1.79 cm   (1.8-2.4cm)  AoV Area,Vmax: 2.00 cm2  (2.5-4.5cm2)      RIGHT VENTRICLE:  RV Basal 3.50 cm  RV Mid   2.80 cm  RV Major 7.1 cm  TAPSE:   25.0 mm  RV s'    0.10 m/s      TRICUSPID VALVE/RVSP:          Normal Ranges:  Peak TR Velocity:     3.25 m/s  Est. RA Pressure:     3 mmHg  RV Syst Pressure:     45 mmHg  (< 30mmHg)  IVC Diam:             1.90 cm      PULMONIC VALVE:          Normal Ranges:  PV Accel Time:  131 msec (>120ms)  PV Max Molina:     1.0 m/s  (0.6-0.9m/s)  PV Max PG:      3.8 mmHg      Pulmonary Veins:  PulmV A Revs Dur: 114.19 msec  PulmV A Revs Molina: 21.81 cm/s  PulmV Robles Molina:   57.26 cm/s  PulmV S/D Molina:    0.86  PulmV Sys Molina:    49.18 cm/s      AORTA:  Asc Ao Diam 3.29 cm      [id number] Ninfa Pastor MD  Electronically signed on 1/3/2025 at 12:11:49 PM        ** Final **    Coronary Angiography: No results found for this or any previous visit from the past 1800 days.    Right Heart Cath: No results found for this or any previous visit from the past 1800 days.    Cardiac Scoring: No results found for this or any previous visit from the past 1800 days.    Cardiac MRI: No results found for this or any previous visit from the past 1800 days.    Nuclear:No results found for this or any previous visit from the past 1800 days.    Metabolic Stress: No results found for this or any previous visit from the past 1800 days.      Transthoracic Echo (TTE) Complete    Result Date: 1/3/2025   [facility], [address] [place] [address]                Tel [phone] and Fax [phone] TRANSTHORACIC ECHOCARDIOGRAM REPORT  Patient Name:       ALBERTALEM Mullins Physician:    [id number] Ninfa Pastor MD Study Date:         1/3/2025            Ordering Provider:     [id number] KAR ANTHONY MRN/PID:            [id number]            Fellow:               [id number] Abby Hess MD Accession#:         [id number]        Nurse: Date of Birth/Age:  1965 / 59      Sonographer:          Cassie Chopra RCS                     years Gender assigned at  F                   Additional Staff: Birth: Height:             157.48 cm           Admit Date: Weight:             107.05 kg           Admission Status:     Inpatient -                                                               Routine BSA / BMI:          2.05 m2 / 43.16     Encounter#:           [id number]                     kg/m2 Blood Pressure:     147/83 mmHg         Department Location:  [facility]                                                               Non Invasive Study Type:    TRANSTHORACIC ECHO (TTE) COMPLETE Diagnosis/ICD: Acute on chronic diastolic (congestive) heart failure                (CHF)-I50.33 Indication:    ADHF CPT Code:      Echo Complete w Full Doppler-16921 Patient History: Diabetes:          Yes Pertinent History: Hyperlipidemia and PVD. ADHF, PAD. Study Detail: The following Echo studies were performed: 2D, M-Mode, Doppler and               color flow. Technically challenging study due to body habitus.               Definity used as a contrast agent for endocardial border               definition. Total contrast used for this procedure was 2 mL via IV               push.  PHYSICIAN INTERPRETATION: Left Ventricle: Left ventricular ejection fraction is normal, calculated by Argueta's biplane at 69%. There are no regional left ventricular wall motion abnormalities. The left ventricular cavity size is normal. There is mildly increased septal and mildly increased posterior left ventricular wall thickness. There is left ventricular concentric remodeling. Spectral Doppler shows a  Grade II (pseudonormal pattern) of left ventricular diastolic filling with an elevated left atrial pressure. Left Atrium: The left atrium is moderately dilated. Right Ventricle: The right ventricle is normal in size. There is normal right ventricular global systolic function. Right Atrium: The right atrium is mildly dilated. Aortic Valve: The aortic valve is trileaflet. There is minimal aortic valve cusp calcification. There is no evidence of aortic valve regurgitation. The peak instantaneous gradient of the aortic valve is 11 mmHg. Mitral Valve: The mitral valve is normal in structure. There is moderate mitral valve regurgitation which is posteriorly directed. The mitral regurgitant orifice area is 31 mm2. The mitral regurgitant volume is 55.50 ml. Mechanism of MR Is possibly restricted motion of the posterior leaflet. Tricuspid Valve: The tricuspid valve is structurally normal. There is moderate tricuspid regurgitation. The Doppler estimated RVSP is moderately elevated at 45.2 mmHg. Pulmonic Valve: The pulmonic valve is structurally normal. There is physiologic pulmonic valve regurgitation. Pericardium: Small pericardial effusion. The effusion is circumferential. Pleural: There is a small left pleural effusion. Aorta: The aortic root is normal. The Ao Sinus is 2.90 cm. The Asc Ao is 3.30 cm. Systemic Veins: The inferior vena cava appears normal in size, with IVC inspiratory collapse greater than 50%. In comparison to the previous echocardiogram(s): Compared with study dated 1/25/2024, the degree of MR appears moderate today rather than mild on prior study. TR is also worsened.  CONCLUSIONS:  1. Left ventricular ejection fraction is normal, calculated by Argueta's biplane at 69%.  2. Spectral Doppler shows a Grade II (pseudonormal pattern) of left ventricular diastolic filling with an elevated left atrial pressure.  3. There is normal right ventricular global systolic function.  4. The left atrium is moderately  dilated.  5. Moderate mitral valve regurgitation.  6. Mechanism of MR Is possibly restricted motion of the posterior leaflet.  7. Moderate tricuspid regurgitation visualized.  8. Moderately elevated right ventricular systolic pressure.  9. Small pericardial effusion. 10. Compared with study dated 1/25/2024, the degree of MR appears moderate today rather than mild on prior study. TR is also worsened. QUANTITATIVE DATA SUMMARY:  2D MEASUREMENTS:          Normal Ranges: LAs:             3.32 cm  (2.7-4.0cm) RVIDd:           2.76 cm  (0.9-3.6cm) IVSd:            1.10 cm  (0.6-1.1cm) LVPWd:           0.96 cm  (0.6-1.1cm) LVIDd:           4.41 cm  (3.9-5.9cm) LVIDs:           2.83 cm LV Mass Index:   76 g/m2 LVEDV Index:     44 ml/m2 LV % FS          35.8 %  LA VOLUME:                    Normal Ranges: LA Vol A4C:        88.4 ml    (22+/-6mL/m2) LA Vol A2C:        79.6 ml LA Vol BP:         85.3 ml LA Vol Index A4C:  43.1ml/m2 LA Vol Index A2C:  38.8 ml/m2 LA Vol Index BP:   41.6 ml/m2 LA Area A4C:       25.6 cm2 LA Area A2C:       23.9 cm2 LA Major Axis A4C: 6.3 cm LA Major Axis A2C: 6.1 cm LA Volume Index:   35.2 ml/m2  RA VOLUME BY A/L METHOD:            Normal Ranges: RA Vol A4C:              73.5 ml    (8.3-19.5ml) RA Vol Index A4C:        35.8 ml/m2 RA Area A4C:             21.0 cm2 RA Major Axis A4C:       5.1 cm  AORTA MEASUREMENTS:         Normal Ranges: Ao Sinus, d:        2.90 cm (2.1-3.5cm) Asc Ao, d:          3.30 cm (2.1-3.4cm)  LV SYSTOLIC FUNCTION BY 2D PLANIMETRY (MOD):                      Normal Ranges: EF-A4C View:    75 % (>=55%) EF-A2C View:    66 % EF-Biplane:     69 % LV EF Reported: 69 %  LV DIASTOLIC FUNCTION:             Normal Ranges: MV Peak E:             1.19 m/s    (0.7-1.2 m/s) MV Peak A:             1.00 m/s    (0.42-0.7 m/s) E/A Ratio:             1.19        (1.0-2.2) MV e'                  0.040 m/s   (>8.0) MV lateral e'          0.05 m/s MV medial e'           0.03 m/s MV A Dur:               183.39 msec E/e' Ratio:            29.68       (<8.0) PulmV Sys Molina:         49.18 cm/s PulmV Robles Molina:        57.26 cm/s PulmV S/D Molina:         0.86 PulmV A Revs Molina:      21.81 cm/s PulmV A Revs Dur:      114.19 msec  MITRAL VALVE:          Normal Ranges: MV DT:        232 msec (150-240msec)  MITRAL INSUFFICIENCY:             Normal Ranges: MR VTI:               180.58 cm MR Vmax:              523.70 cm/s MR Volume:            55.50 ml MR Flow Rt:           160.96 ml/s MR EROA:              31 mm2  AORTIC VALVE:            Normal Ranges: AoV Vmax:      1.68 m/s  (<=1.7m/s) AoV Peak P.3 mmHg (<20mmHg) LVOT Max Molina:  1.33 m/s  (<=1.1m/s) LVOT VTI:      26.11 cm LVOT Diameter: 1.79 cm   (1.8-2.4cm) AoV Area,Vmax: 2.00 cm2  (2.5-4.5cm2)  RIGHT VENTRICLE: RV Basal 3.50 cm RV Mid   2.80 cm RV Major 7.1 cm TAPSE:   25.0 mm RV s'    0.10 m/s  TRICUSPID VALVE/RVSP:          Normal Ranges: Peak TR Velocity:     3.25 m/s Est. RA Pressure:     3 mmHg RV Syst Pressure:     45 mmHg  (< 30mmHg) IVC Diam:             1.90 cm  PULMONIC VALVE:          Normal Ranges: PV Accel Time:  131 msec (>120ms) PV Max Molina:     1.0 m/s  (0.6-0.9m/s) PV Max PG:      3.8 mmHg  Pulmonary Veins: PulmV A Revs Dur: 114.19 msec PulmV A Revs Molina: 21.81 cm/s PulmV Robles Molina:   57.26 cm/s PulmV S/D Molina:    0.86 PulmV Sys Molina:    49.18 cm/s  AORTA: Asc Ao Diam 3.29 cm  [id number] Ninfa Pastor MD Electronically signed on 1/3/2025 at 12:11:49 PM  ** Final **     ECG 12 lead    Result Date: 1/3/2025  Normal sinus rhythm Left posterior fascicular block Cannot rule out Anterior infarct (cited on or before 15-ISAK-2024) Abnormal ECG When compared with ECG of 06-SEP-2024 03:33, Left posterior fascicular block is now Present Inverted T waves have replaced nonspecific T wave abnormality in Inferior leads See ED provider note for full interpretation and clinical correlation Confirmed by Kaye Jay ([id number]) on 1/3/2025 12:44:14 AM    XR chest 1  view    Result Date: 1/2/2025  STUDY: Chest Radiograph;  1/2/2025 12:59PM INDICATION: Shortness of breath. COMPARISON: 9/6/2024 XR Chest. ACCESSION NUMBER(S): [id number] ORDERING CLINICIAN: RENETTA PALMA TECHNIQUE:  Frontal chest was obtained at 1258 hours. FINDINGS: Cardiac silhouette remains enlarged. There is pulmonary vascular congestion bilaterally. Small bibasal pleural effusions with associated atelectasis. No visible pneumothorax.     Constellation of findings is suggestive of congestive heart failure. Signed by Rupesh Borges MD    Point of Care Ultrasound    Result Date: 1/2/2025  Valdo Whatley DO     1/4/2025 11:13 AM Performed by: Valdo Whatley DO Authorized by: Valdo Whatley DO  Respiratory Indications: shortness of breath, tachypnea and hypoxia Procedure: Cardiac Ultrasound Findings:  Views: parasternal long, parasternal short and apical four Effusion: The pericardial space was visualized and was positive for a PERICARDIAL EFFUSION. Activity: Ventricular contractions were visualized. LV: LV systolic function was NORMAL. RV: RV size was NORMAL. Impression: Cardiac: The focused cardiac ultrasound exam had ABNORMAL findings as specified.     BI US breast limited left    Result Date: 12/26/2024  Interpreted By:  Jax Hagan, STUDY: BI US BREAST LIMITED LEFT;  12/26/2024 11:30 am   ACCESSION NUMBER(S): [id number]   ORDERING CLINICIAN: ROGER URBINA   INDICATION: Imaging follow-up to document resolution of a probable hematoma.   ,R92.8 Other abnormal and inconclusive findings on diagnostic imaging of breast   COMPARISON: 11/25/2020   FINDINGS: Targeted ultrasound was performed of the left breast by a registered sonographer with elastography.   The patient was initially recalled from a screening mammogram for the left breast finding.   Sonographic images were obtained of the left breast 23 cm from the nipple. The previously described finding has since resolved which is consistent with the  suspected diagnosis of a hematoma. No new suspicious findings are seen. Elastography was also performed which demonstrates no areas of increased stiffness.       Interval resolution of the previously described finding. Patient can return to screening.   BI-RADS CATEGORY: BI-RADS Category:  1 Negative. Recommendation:  Annual Screening. Recommended Date:  1 Year. Laterality:  Bilateral.   For any future breast imaging appointments, please call [phone]).     MACRO: None   Signed by: Jax Hagan 12/26/2024 11:36 AM Dictation workstation:   [id number]       LDA:  Venous Sheath 01/08/25 0814 7 Fr. Right Other (Comment) (Active)   Placement Date/Time: 01/08/25 0814   Hand Hygiene Completed: Yes  Sheath Size: 7 Fr.  Line Orientation: Right  Sheath Insertion Site: (c) Other (Comment)  Sutured: No  Local Anesthetic: Injectable  Site Prep: Chlorhexidine   Free Flowing Blood Return:...   Number of days: 0     NUTRITION: Adult diet Regular  EMERGENCY CONTACT: Extended Emergency Contact Information  Primary Emergency Contact: Brandon Rousseau  Mobile Phone: [phone]  Relation: Significant Other  Secondary Emergency Contact: Kimberly Abbott  Mobile Phone: [phone]  Relation: Daughter   needed? No  CODE STATUS: Full Code  DISPO: Discharge Planning  Living Arrangements: Spouse/significant other  Support Systems: Spouse/significant other  Assistance Needed: None  Type of Residence: Private residence  Number of Stairs to Enter Residence: 5  Number of Stairs Within Residence: 0  Do you have animals or pets at home?: Yes  Type of Animals or Pets: dog  Who is requesting discharge planning?: Provider  Home or Post Acute Services: None  Type of Home Care Services: Home nursing visits  Expected Discharge Disposition: Home  Does the patient need discharge transport arranged?: No  AMPAC: Daily Activity - Total Score: 24    FOLLOWUP:   Future Appointments   Date Time Provider Department Center   1/14/2025  2:20 PM  Amanda Joe, PharmD [id number] [facility]   1/17/2025 10:00 AM Marylin Sparks MD MPH [id number] [facility]   1/20/2025 11:30 AM Marcin Motta MD [id number] [facility]   2/5/2025  9:45 AM Barrera Rodriguez MD [id number] [facility]   2/20/2025 10:30 AM Mone Aquino MD [id number] [facility]

## 2025-01-08 NOTE — POST-PROCEDURE NOTE
Physician Transition of Care Summary  Invasive Cardiovascular Lab    Procedure Date: 1/8/2025  Attending:    * Migel Stanton - Primary  Resident/Fellow/Other Assistant: Surgeons and Role:     * Ernesto Cardoso MD - Fellow     * Jd Gregory MD - Fellow    Indications:   Pre-op Diagnosis      * Acute on chronic diastolic (congestive) heart failure [I50.33]    Post-procedure diagnosis:   Post-op Diagnosis     * Acute on chronic diastolic (congestive) heart failure [I50.33]    Procedure(s):   Right Heart Cath  23548 - [place] RIGHT HEART CATH O2 SATURATION & CARDIAC OUTPUT        Procedure Findings:   Uncomplicated right internal jugular access    RA- 14  RV- 66/13  PA- 55/14 (mean 34)  PCWP- 20  RA sat- 79%  PA sat- 79%  CO/CI- 10.44/5.09    Overall impression- Elevated right and left filling pressures with preserved CO/CI. Mild pulmonary hypertension.     Hemostasis with manual pressure    Description of the Procedure:   As above    Complications:   None    Stents/Implants:   Implants       No implant documentation for this case.            Anticoagulation/Antiplatelet Plan:   Per primary team      Estimated Blood Loss:   5 mL    Anesthesia: Moderate Sedation Anesthesia Staff: No anesthesia staff entered.    Any Specimen(s) Removed:   No specimens collected during this procedure.    Disposition:   LT 3      Electronically signed by: Ernesto Cardoso MD, 1/8/2025 8:36 AM   normal...

## 2025-01-08 NOTE — INTERVAL H&P NOTE
Patient seen and assessed pre-procedure. Allergies and current medications reviewed. NPO since midnight, all questions answered. Plan for RHC. Labs reviewed, Hgb/Plt 9.9/330, GFR/Cr 13/3.82. Patient currently on SAPT with ASA 81.     H&P reviewed. The patient was examined and there are no changes to the H&P.

## 2025-01-08 NOTE — POST-PROCEDURE NOTE
Physician Transition of Care Summary  Invasive Cardiovascular Lab    Procedure Date: 1/8/2025  Attending:    * Migel Stanton - Primary  Resident/Fellow/Other Assistant: Surgeons and Role:     * Ernesto Cardoso MD - Fellow     * Jd Gregory MD - Fellow    Indications:   Pre-op Diagnosis      * Acute on chronic diastolic (congestive) heart failure [I50.33]    Post-procedure diagnosis:   Post-op Diagnosis     * Acute on chronic diastolic (congestive) heart failure [I50.33]    Procedure(s):   Right Heart Cath  07157 - [place] RIGHT HEART CATH O2 SATURATION & CARDIAC OUTPUT        Procedure Findings:   RHC  RA- 20/17  RV-66/13  PA mean 20  PWC- 24/21, 20mmHg    PA sat - 78.5, RA sat 79.1    CO 10.4 and CI 5- Yung's    Preserved CO and CI with elevated wedge - fluid overloaded    Description of the Procedure:   RIJ    Complications:   Nil     Stents/Implants:   Implants       No implant documentation for this case.            Anticoagulation/Antiplatelet Plan:       Estimated Blood Loss:   5 mL    Anesthesia: Moderate Sedation Anesthesia Staff: No anesthesia staff entered.    Any Specimen(s) Removed:   No specimens collected during this procedure.    Disposition:   Return to [facility]      Electronically signed by: Jd Gregory MD, 1/8/2025 8:34 AM

## 2025-01-08 NOTE — PROGRESS NOTES
Transitional Care Coordination Progress Note:  Plan per team: s/p RHC, diuresis continues.  Payer: [employer]  Status: Inpatient  Discharge disposition: Home  Potential barriers: none  ADOD: anni Montenegro RN

## 2025-01-08 NOTE — SIGNIFICANT EVENT
Rapid Response Nurse Note: RADAR alert: Score = 6    Pager time: [phone]  Arrival time: 936  Event end time: 938  Location: [place] [address]  [x] Triage by phone or secure messaging    Rapid response initiated by:  [] Rapid response RN [] Family [] Nursing Supervisor [] Physician   [x] RADAR auto page [] Sepsis auto-page [] RN [] RT   [] NP/PA [] Other:     Primary reason for call:   [] BAT [] New CPAP/BiPAP [] Bleeding [] Change in mental status   [] Chest pain [] Code blue [] FiO2 >/= 50% [] HR </= 40 bpm   [] HR >/= 130 bpm [] Hyperglycemia [] Hypoglycemia [x] RADAR    [] RR </= 8 bpm [] RR >/= 30 bpm [] SBP </= 90 mmHg [] SpO2 < 90%   [] Seizure [] Sepsis [] Shortness of breath  [] Staff concern: see comments     Initial VS and/or RADAR VS: T 36.1 °C; HR 74; RR 21; /77; SPO2 92%.      Interventions:  [x] None [] ABG/VBG [] Assist w/ICU transfer [] BAT paged    [] Bag mask [] Blood [] Cardioversion [] Code Blue   [] Code blue for intubation [] Code status changed [] Chest x-ray [] EKG   [] IV fluid/bolus [] KUB x-ray [] Labs/cultures [] Medication   [] Nebulizer treatment [] NIPPV (CPAP/BiPAP) [] Oxygen [] Oral airway   [] Peripheral IV [] Palliative care consult [] CT/MRI [] Sepsis protocol    [] Suctioned [] Other:     Outcome:  [] Coded and  [] Code blue for intubation [] Coded and transferred to ICU []  on division   [x] Remained on division (no change) [] Remained on division + additional monitoring [] Remained in ED [] Transferred to ED   [] Transferred to ICU [] Transferred to inpatient status [] Transferred for interventions (procedure) [] Transferred to ICU stepdown    [] Transferred to surgery [] Transferred to telemetry [] Sepsis protocol [] STEMI protocol   [] Stroke protocol [x] Bedside nurse instructed to page rapid response for any concerns or acute change in condition/VS     Additional Comments: RADAR auto page received for above vitals. Per bedside RN, no acute changes or concerns at  this time.  Please page rapid response for any concerns for deterioration or assistance needed.

## 2025-01-09 ENCOUNTER — APPOINTMENT (OUTPATIENT)
Dept: RADIOLOGY | Facility: HOSPITAL | Age: 60
End: 2025-01-09
Payer: COMMERCIAL

## 2025-01-09 LAB
ALBUMIN SERPL BCP-MCNC: 3.7 G/DL (ref 3.4–5)
ANION GAP SERPL CALC-SCNC: 17 MMOL/L (ref 10–20)
BUN SERPL-MCNC: 62 MG/DL (ref 6–23)
CALCIUM SERPL-MCNC: 9.2 MG/DL (ref 8.6–10.6)
CHLORIDE SERPL-SCNC: 102 MMOL/L (ref 98–107)
CO2 SERPL-SCNC: 24 MMOL/L (ref 21–32)
CREAT SERPL-MCNC: 3.61 MG/DL (ref 0.5–1.05)
EGFRCR SERPLBLD CKD-EPI 2021: 14 ML/MIN/1.73M*2
ERYTHROCYTE [DISTWIDTH] IN BLOOD BY AUTOMATED COUNT: 14.4 % (ref 11.5–14.5)
GLUCOSE BLD MANUAL STRIP-MCNC: 147 MG/DL (ref 74–99)
GLUCOSE BLD MANUAL STRIP-MCNC: 172 MG/DL (ref 74–99)
GLUCOSE BLD MANUAL STRIP-MCNC: 232 MG/DL (ref 74–99)
GLUCOSE BLD MANUAL STRIP-MCNC: 242 MG/DL (ref 74–99)
GLUCOSE SERPL-MCNC: 246 MG/DL (ref 74–99)
HCT VFR BLD AUTO: 30.7 % (ref 36–46)
HGB BLD-MCNC: 9.6 G/DL (ref 12–16)
MAGNESIUM SERPL-MCNC: 2.4 MG/DL (ref 1.6–2.4)
MCH RBC QN AUTO: 29.7 PG (ref 26–34)
MCHC RBC AUTO-ENTMCNC: 31.3 G/DL (ref 32–36)
MCV RBC AUTO: 95 FL (ref 80–100)
NRBC BLD-RTO: 0 /100 WBCS (ref 0–0)
PHOSPHATE SERPL-MCNC: 6.1 MG/DL (ref 2.5–4.9)
PLATELET # BLD AUTO: 324 X10*3/UL (ref 150–450)
POTASSIUM SERPL-SCNC: 4.8 MMOL/L (ref 3.5–5.3)
RBC # BLD AUTO: 3.23 X10*6/UL (ref 4–5.2)
SODIUM SERPL-SCNC: 138 MMOL/L (ref 136–145)
WBC # BLD AUTO: 5.5 X10*3/UL (ref 4.4–11.3)

## 2025-01-09 PROCEDURE — A9540 TC99M MAA: HCPCS | Performed by: STUDENT IN AN ORGANIZED HEALTH CARE EDUCATION/TRAINING PROGRAM

## 2025-01-09 PROCEDURE — 99232 SBSQ HOSP IP/OBS MODERATE 35: CPT | Performed by: STUDENT IN AN ORGANIZED HEALTH CARE EDUCATION/TRAINING PROGRAM

## 2025-01-09 PROCEDURE — 2500000001 HC RX 250 WO HCPCS SELF ADMINISTERED DRUGS (ALT 637 FOR MEDICARE OP): Performed by: NURSE PRACTITIONER

## 2025-01-09 PROCEDURE — 2500000001 HC RX 250 WO HCPCS SELF ADMINISTERED DRUGS (ALT 637 FOR MEDICARE OP)

## 2025-01-09 PROCEDURE — 2500000001 HC RX 250 WO HCPCS SELF ADMINISTERED DRUGS (ALT 637 FOR MEDICARE OP): Performed by: STUDENT IN AN ORGANIZED HEALTH CARE EDUCATION/TRAINING PROGRAM

## 2025-01-09 PROCEDURE — 2500000002 HC RX 250 W HCPCS SELF ADMINISTERED DRUGS (ALT 637 FOR MEDICARE OP, ALT 636 FOR OP/ED): Performed by: NURSE PRACTITIONER

## 2025-01-09 PROCEDURE — 36415 COLL VENOUS BLD VENIPUNCTURE: CPT | Performed by: NURSE PRACTITIONER

## 2025-01-09 PROCEDURE — 78803 RP LOCLZJ TUM SPECT 1 AREA: CPT

## 2025-01-09 PROCEDURE — 2500000004 HC RX 250 GENERAL PHARMACY W/ HCPCS (ALT 636 FOR OP/ED): Performed by: NURSE PRACTITIONER

## 2025-01-09 PROCEDURE — 80069 RENAL FUNCTION PANEL: CPT | Performed by: NURSE PRACTITIONER

## 2025-01-09 PROCEDURE — 2500000002 HC RX 250 W HCPCS SELF ADMINISTERED DRUGS (ALT 637 FOR MEDICARE OP, ALT 636 FOR OP/ED)

## 2025-01-09 PROCEDURE — 1200000002 HC GENERAL ROOM WITH TELEMETRY DAILY

## 2025-01-09 PROCEDURE — 83735 ASSAY OF MAGNESIUM: CPT | Performed by: NURSE PRACTITIONER

## 2025-01-09 PROCEDURE — 85027 COMPLETE CBC AUTOMATED: CPT | Performed by: NURSE PRACTITIONER

## 2025-01-09 PROCEDURE — 3430000001 HC RX 343 DIAGNOSTIC RADIOPHARMACEUTICALS: Performed by: STUDENT IN AN ORGANIZED HEALTH CARE EDUCATION/TRAINING PROGRAM

## 2025-01-09 PROCEDURE — 82947 ASSAY GLUCOSE BLOOD QUANT: CPT

## 2025-01-09 RX ORDER — FUROSEMIDE 10 MG/ML
80 INJECTION INTRAMUSCULAR; INTRAVENOUS
Status: DISCONTINUED | OUTPATIENT
Start: 2025-01-09 | End: 2025-01-10

## 2025-01-09 RX ADMIN — ISOSORBIDE DINITRATE 10 MG: 10 TABLET ORAL at 18:48

## 2025-01-09 RX ADMIN — ISOSORBIDE DINITRATE 10 MG: 10 TABLET ORAL at 15:01

## 2025-01-09 RX ADMIN — ATORVASTATIN CALCIUM 40 MG: 40 TABLET, FILM COATED ORAL at 20:48

## 2025-01-09 RX ADMIN — FUROSEMIDE 80 MG: 10 INJECTION, SOLUTION INTRAVENOUS at 11:54

## 2025-01-09 RX ADMIN — ASPIRIN 81 MG: 81 TABLET, CHEWABLE ORAL at 08:06

## 2025-01-09 RX ADMIN — INSULIN LISPRO 4 UNITS: 100 INJECTION, SOLUTION INTRAVENOUS; SUBCUTANEOUS at 18:48

## 2025-01-09 RX ADMIN — INSULIN GLARGINE 16 UNITS: 100 INJECTION, SOLUTION SUBCUTANEOUS at 08:06

## 2025-01-09 RX ADMIN — FOLIC ACID 1 MG: 1 TABLET ORAL at 08:06

## 2025-01-09 RX ADMIN — AMLODIPINE BESYLATE 10 MG: 10 TABLET ORAL at 08:06

## 2025-01-09 RX ADMIN — GABAPENTIN 300 MG: 300 CAPSULE ORAL at 20:48

## 2025-01-09 RX ADMIN — HYDRALAZINE HYDROCHLORIDE 50 MG: 50 TABLET ORAL at 15:01

## 2025-01-09 RX ADMIN — FUROSEMIDE 80 MG: 10 INJECTION, SOLUTION INTRAVENOUS at 16:48

## 2025-01-09 RX ADMIN — HEPARIN SODIUM 7500 UNITS: 5000 INJECTION INTRAVENOUS; SUBCUTANEOUS at 15:01

## 2025-01-09 RX ADMIN — HYDRALAZINE HYDROCHLORIDE 50 MG: 50 TABLET ORAL at 08:06

## 2025-01-09 RX ADMIN — NEPHROCAP 1 CAPSULE: 1 CAP ORAL at 08:06

## 2025-01-09 RX ADMIN — GABAPENTIN 300 MG: 300 CAPSULE ORAL at 08:06

## 2025-01-09 RX ADMIN — HYDRALAZINE HYDROCHLORIDE 50 MG: 50 TABLET ORAL at 20:48

## 2025-01-09 RX ADMIN — KIT FOR THE PREPARATION OF TECHNETIUM TC 99M ALBUMIN AGGREGATED 4.4 MILLICURIE: 2.5 INJECTION, POWDER, FOR SOLUTION INTRAVENOUS at 09:51

## 2025-01-09 RX ADMIN — ISOSORBIDE DINITRATE 10 MG: 10 TABLET ORAL at 08:06

## 2025-01-09 RX ADMIN — INSULIN LISPRO 2 UNITS: 100 INJECTION, SOLUTION INTRAVENOUS; SUBCUTANEOUS at 08:06

## 2025-01-09 RX ADMIN — HEPARIN SODIUM 7500 UNITS: 5000 INJECTION INTRAVENOUS; SUBCUTANEOUS at 05:59

## 2025-01-09 ASSESSMENT — PAIN - FUNCTIONAL ASSESSMENT
PAIN_FUNCTIONAL_ASSESSMENT: 0-10

## 2025-01-09 ASSESSMENT — COGNITIVE AND FUNCTIONAL STATUS - GENERAL
CLIMB 3 TO 5 STEPS WITH RAILING: A LITTLE
MOVING TO AND FROM BED TO CHAIR: A LITTLE
MOBILITY SCORE: 22
DRESSING REGULAR LOWER BODY CLOTHING: A LITTLE
DAILY ACTIVITIY SCORE: 23

## 2025-01-09 ASSESSMENT — PAIN SCALES - GENERAL
PAINLEVEL_OUTOF10: 0 - NO PAIN

## 2025-01-09 NOTE — PROGRESS NOTES
Subjective Data:   - Cr 3.73 (3.82,3.75,2.93)  - diuresed well with Lasix 80mg once, around 2L neg, Cr stable, appears volume up still   - will give 80mg IV lasix BID today   - urine lytes, osm, Cr reviewed  - VQ scan today:  No wedge-shaped segmental or subsegmental perfusion defect in both  lungs to suggest acute pulmonary embolism (low probability).    Overnight Events:  No acute events overnight.     Objective Data:  Last Recorded Vitals:  Vitals:    01/09/25 0100 01/09/25 0140 01/09/25 0609 01/09/25 0946   BP:  130/73 142/76 145/71   BP Location:  Left arm Left arm Left arm   Patient Position:  Lying Sitting Sitting   Pulse:  82 76 74   Resp:  17 18 16   Temp:  36.6 °C (97.9 °F) 36.2 °C (97.2 °F) 36.3 °C (97.3 °F)   TempSrc:  Temporal Temporal Temporal   SpO2:  94% 95% 94%   Weight: 107 kg (234 lb 12.6 oz)      Height:         Last Labs:  CBC - 1/8/2025:  7:36 PM  6.7 9.4 326    30.5      CMP - 1/8/2025:  7:36 PM  8.7 6.9 14 --- 0.4   6.1 3.2 12 114      PTT - 4/15/2024:  9:28 PM  0.9   10.3 31     TROPHS   Date/Time Value Ref Range Status   01/02/2025 12:53 PM 21 0 - 34 ng/L Final   02/03/2024 01:22 AM 12 0 - 34 ng/L Final   01/24/2024 08:32 PM 25 0 - 34 ng/L Final   01/25/2023 04:58 PM 7 0 - 34 ng/L Final     Comment:     .  Less than 99th percentile of normal range cutoff-  Female and children under 18 years old <35 ng/L; Male <54 ng/L: Negative  Repeat testing should be performed if clinically indicated.   .  Female and children under 18 years old  ng/L; Male  ng/L:  Consistent with possible cardiac damage and possible increased clinical   risk. Serial measurements may help to assess extent of myocardial damage.   .  >120 ng/L: Consistent with cardiac damage, increased clinical risk and  myocardial infarction. Serial measurements may help assess extent of   myocardial damage.   .   NOTE: Children less than 1 year old may have higher baseline troponin   levels and results should be interpreted in  conjunction with the overall   clinical context.  .  NOTE: Troponin I testing is performed using a different   testing methodology at [facility] than at other   system [facility]. Direct result comparisons should only   be made within the same method.     01/25/2023 04:16 PM 8 0 - 34 ng/L Final     Comment:     .  Less than 99th percentile of normal range cutoff-  Female and children under 18 years old <35 ng/L; Male <54 ng/L: Negative  Repeat testing should be performed if clinically indicated.   .  Female and children under 18 years old  ng/L; Male  ng/L:  Consistent with possible cardiac damage and possible increased clinical   risk. Serial measurements may help to assess extent of myocardial damage.   .  >120 ng/L: Consistent with cardiac damage, increased clinical risk and  myocardial infarction. Serial measurements may help assess extent of   myocardial damage.   .   NOTE: Children less than 1 year old may have higher baseline troponin   levels and results should be interpreted in conjunction with the overall   clinical context.  .  NOTE: Troponin I testing is performed using a different   testing methodology at [facility] than at other   [facility] hospitals. Direct result comparisons should only   be made within the same method.       BNP   Date/Time Value Ref Range Status   01/04/2025 09:19  0 - 99 pg/mL Final   01/02/2025 12:53  0 - 99 pg/mL Final     HGBA1C   Date/Time Value Ref Range Status   01/02/2025 06:35 PM 8.7 See comment % Final   11/19/2024 02:06 PM 9 4.2 - 6.5 % Final   07/19/2024 08:52 AM 11.2 4.2 - 6.5 % Final     VLDL   Date/Time Value Ref Range Status   05/08/2023 09:35 AM 38 0 - 40 mg/dL Final   04/12/2021 09:06 AM 21 0 - 40 mg/dL Final   10/20/2020 12:36 PM 24 0 - 40 mg/dL Final      Last I/O:  I/O last 3 completed shifts:  In: 1680 (15.8 mL/kg) [P.O.:1680]  Out: 2305 (21.6 mL/kg) [Urine:2300 (0.6 mL/kg/hr); Blood:5]  Weight: 106.5 kg     Past  Cardiology Tests (Last 3 Years):    Echo:  Transthoracic Echo (TTE) Complete 01/25/2024     PHYSICIAN INTERPRETATION:  Left Ventricle: The left ventricular systolic function is hyperdynamic, with an estimated ejection fraction of 70-75%. There are no regional wall motion abnormalities. The left ventricular cavity size is normal. The left ventricular septal wall thickness is mildly increased. There is mild concentric left ventricular hypertrophy. Spectral Doppler shows a normal pattern of left ventricular diastolic filling.  Left Atrium: The left atrium is mildly dilated.  Right Ventricle: The right ventricle was not well visualized. There is normal right ventricular global systolic function.  Right Atrium: The right atrium is normal in size.  Aortic Valve: The aortic valve is trileaflet. There is no evidence of aortic valve regurgitation. The peak instantaneous gradient of the aortic valve is 10.0 mmHg. The mean gradient of the aortic valve is 5.0 mmHg.  Mitral Valve: The mitral valve is normal in structure. There is trace to mild mitral valve regurgitation.  Tricuspid Valve: The tricuspid valve is structurally normal. There is trace to mild tricuspid regurgitation. The Doppler estimated RVSP is within normal limits at 32.4 mmHg.  Pulmonic Valve: The pulmonic valve is structurally normal. There is physiologic pulmonic valve regurgitation.  Pericardium: There is a trivial pericardial effusion.  Pleural: There is left pleural effusion.  Aorta: The aortic root is normal.    CONCLUSIONS:  1. Left ventricular systolic function is hyperdynamic with a 70-75% estimated ejection fraction.  2. RVSP within normal limits.    ** Final **    Transthoracic Echo (TTE) Complete 01/3/2025  CONCLUSIONS:   1. Left ventricular ejection fraction is normal, calculated by Argueta's biplane at 69%.   2. Spectral Doppler shows a Grade II (pseudonormal pattern) of left ventricular diastolic filling with an elevated left atrial pressure.   3.  There is normal right ventricular global systolic function.   4. The left atrium is moderately dilated.   5. Moderate mitral valve regurgitation.   6. Mechanism of MR Is possibly restricted motion of the posterior leaflet.   7. Moderate tricuspid regurgitation visualized.   8. Moderately elevated right ventricular systolic pressure.   9. Small pericardial effusion.  10. Compared with study dated 1/25/2024, the degree of MR appears moderate today rather than mild on prior study. TR is also worsened.    Inpatient Medications:  Scheduled medications   Medication Dose Route Frequency    amLODIPine  10 mg oral Daily    aspirin  81 mg oral Daily    atorvastatin  40 mg oral Nightly    [Held by provider] empagliflozin  10 mg oral Daily    folic acid  1 mg oral Daily    furosemide  80 mg intravenous BID    gabapentin  300 mg oral BID    heparin (porcine)  7,500 Units subcutaneous q8h NEHEMIAS    hydrALAZINE  50 mg oral TID    insulin glargine  16 Units subcutaneous Daily    insulin lispro  0-10 Units subcutaneous TID AC    isosorbide dinitrate  10 mg oral TID    polyethylene glycol  17 g oral Daily    vitamin B complex-vitamin C-folic acid  1 capsule oral Daily     PRN medications   Medication    acetaminophen    albuterol    dextrose    dextrose    glucagon    glucagon    hydrALAZINE    oxygen    oxygen     Continuous Medications   Medication Dose Last Rate     Physical Exam:  General: NAD, alert, uncomfortable appearing (reported headache)  Skin: warm and dry throughout, no abrasions or ecchymosis  Head/ neck: unable to appreciate JVD due to body positioning  Cardiac: RRR, no murmurs or rubs  Pulm: diminished bases, on room air   GI: soft, nontender, non-distended, +BS  Extremities: trace BL LE edema, warm throughout   Neuro: no focal neuro deficits, A&Ox3  Psych: appropriate mood and behavior, pleasant       Assessment/Plan   Nuris Squires is a 59 y.o. female presenting with PMH of poorly controlled insulin dependent type 2  diabetes c/b neuropathy, hyperlipidemia, PAD, CKD stage IV, hypertension, multiple foot infections s/p partial R great toe amputation, fall w/L femur fracture s/p ORIF (4/2024) that presents to the emergency department today for shortness of breath x1 day. Admitted under HHVI Service for further management of acute ADHF and HTN Urgency.     Acute diastolic heart failure  Chest tightness, resolved  - likely 2/2 uncontrolled HTN  - TTE (1/2024): EF 70-75%  - TTE 1/3/2025: EF 69%, grade II diastolic filling, moderate MR, moderate TR, small pericardial effusion  - neg stress 10/2019  - CXR: pulm vascular congestion BL, small bibasilar pleural effusions  -  (ISO obesity) -> repeat  1/5  - HS trop 21, no ischemic changes on ECG, no c/f ACS  - dry weight ~100kg  - admit weight: 107kg  - daily weight: 107 (106 kg)  - of note, pharmacy verified patient on PO lasix 20mg daily at home (?for unclear reasons, presumably prescribed by PCP for leg swelling)-> per patient, was just started on it at home  - s/p diuresis w/IVP Lasix boluses--->  - 1/6 transition to PO lasix 40mg BID iso Cr bump and downtrending BNP,started empa 10mg daily   - 1/7 hold empa lasix with Cr 3.75 from 2.93  - Cr 3.82 today  -  RHC done, reviewed tracings with Dr. Mckee, numbers are questionable but RVEDP 20, PA 75/25, unclear wedge  - VQ scan as patients PA quite high, eval for PE iso pulm htn, No wedge-shaped segmental or subsegmental perfusion defect in both lungs to suggest acute pulmonary embolism (low probability).  - diuresed well with Lasix 80mg once, around 2L neg, Cr stable, appears volume up still   - will give 80mg IV lasix BID today   - urine lytes, osm, Cr reviewed  - continue hydralazine 50 mg TID,  isordil 10 mg TID (tolerating well)  - Unable to add robyn/ SGLTII due to current renal function  - Daily standing weights, strict I&O's, 2g sodium diet, 2L fluid restriction      Acute hypoxic respiratory failure iso of ADHF  exacerbation, improving  - p.ox initially at 61% but improved rapidly with placement of nasal cannula  - She was requiring 6 L of oxygen, BiPAP briefly due to the concern for SCAPE, received SL nitro and she was able to wean off of BiPAP  - Flu A/B/RSV/COVID negative  - diuresis plan as above  - weaned to RA (1/5)     HTN urgency, improving  - BP on on admit: 220/98  - SBP improved to 143 after resumption of home meds and s/p IVP Hydral 5mg x1  - last 24 hour SBP range: 100s-140s  - BPs labile, clonidine weaned off on 1/5, intermittent headaches w/isordil (tolerating 10mg TID)  - cont. BP meds as above, home amlodipine 10mg daily     CKD IV, worsening  - Cr last year 1.7-5.3, worsening since 2022  - admit Cr 2.34  - daily Cr 3.73 (3.82,3.75,2.93,2.74, 2.55, 2.49, 2.31)  - likely cardiorenal 2/2 ADHF  - diuresis plan as above  - urine lytes, osm, Cr done, reviewed     Insulin dependent Type 2 diabetes  Poorly controlled  - Follow with clinical pharmacy closely/PCP  - Hgb A1c 8.7% (improved from previous 11.9%)  - home regimen: Mounjaro 2.5mg weekly, basaglar 32 units every morning, admelog SS with meals  - c/w lantus 16 units every morning (decreased from 32U iso AM BG of 62) and SSI #2 while inpatient  - start empa (as above)  - OARRS reviewed, cont. Home gabapentin 300mg BID      HLD  PAD  - c/w asa 81mg daily, atorva 40mg daily        DVT ppx: subQ heparin  Emergency contacts: Boyfrienterrie Rousseau #[phone]  Brother Luis Squires #[phone]  DISPO: previously received [facility]HC [facility]; likely resume at discharge   - HF navigator following, HF care established here at   - discharge home pending improvement in Cr, volume optimization     All labs, vital signs, tests & imaging results, and medications were reviewed.     Patient seen and discussed with Dr. Mckee    Code Status:  Full Code    Rich Sawyer, APRN-CNP, DNP

## 2025-01-09 NOTE — PROGRESS NOTES
[facility] Attending Shared Visit Note    This is a shared visit. Please see Advanced Practice Provider's encounter note for additional details.      Briefly, 59-year-old female from [place] with PMH of poorly controlled insulin-dependent T2DM c/b neuropathy, hyperlipidemia, PAD, CKD IV, HTN, multiple foot infections s/p partial R great toe amputation, fall w/ L femur fracture s/p ORIF (4/2024) presenting with SOB and HTN urgency. On presentation, reated with BiPAP, IV Lasix, hydralazine, and isordil. Now weaned to RA, BNP down, BP labile but trending lower. Renal function labile and worsened with diuresis    Echo showed EF 69%, moderate MR/TR, elevated LAP, and small pericardial effusion.   RHC obtained in setting of WRF:  RA 20, RVSP 72, PA 72/24 (40), W: poor waveform, ?24. CO 10.4/5.1.           Exam: Breathing unlabored. Regular. Warm. 1+ edema    Plan:   - Amlodipine, Hydralazine, Isosorbide dinitrate  - Spironolactone and SGLT2i: Not initiated due to current renal function (CKD IV)  - Diuretics: IV diuresis    Dispo: Discharge pending further HF med optimization and close outpatient follow-up.    Luis Mckee MD    Objective   Admit Date: 1/2/2025  Hospital Length of Stay: 7   Home: [facility] [id number]    MEDICATIONS  Infusions:     Scheduled:  amLODIPine, 10 mg, Daily  aspirin, 81 mg, Daily  atorvastatin, 40 mg, Nightly  [Held by provider] empagliflozin, 10 mg, Daily  folic acid, 1 mg, Daily  gabapentin, 300 mg, BID  heparin (porcine), 7,500 Units, q8h NEHEMIAS  hydrALAZINE, 50 mg, TID  insulin glargine, 16 Units, Daily  insulin lispro, 0-10 Units, TID AC  isosorbide dinitrate, 10 mg, TID  polyethylene glycol, 17 g, Daily  vitamin B complex-vitamin C-folic acid, 1 capsule, Daily      PRN:  acetaminophen, 650 mg, q6h PRN  albuterol, 2.5 mg, q6h PRN  dextrose, 12.5 g, q15 min PRN  dextrose, 25 g, q15 min PRN  glucagon, 1 mg, q15 min PRN  glucagon, 1 mg, q15 min PRN  hydrALAZINE, 5 mg, q6h PRN  oxygen, ,  Continuous PRN - O2/gases  oxygen, , Continuous PRN - O2/gases      Prior to Admission Meds:  Medications Prior to Admission   Medication Sig Dispense Refill Last Dose/Taking    acetaminophen (Tylenol) 325 mg tablet Take 2 tablets (650 mg) by mouth every 4 hours if needed for mild pain (1 - 3).   Past Month    albuterol 90 mcg/actuation inhaler Inhale 2 puffs every 6 hours if needed for wheezing. 6.7 g 0 Past Week    b complex vitamins capsule Take 1 capsule by mouth once daily.   1/1/2025    cloNIDine (Catapres) 0.1 mg tablet Take 1 tablet (0.1 mg) by mouth 2 times a day. 180 tablet 0 1/1/2025    folic acid (Folvite) 1 mg tablet Take 1 tablet (1 mg) by mouth once daily.   1/1/2025    gabapentin (Neurontin) 300 mg capsule Take 1 capsule (300 mg) by mouth 2 times a day. 180 capsule 0 1/1/2025    insulin glargine (Basaglar KwikPen U-100 Insulin) 100 unit/mL (3 mL) pen Inject 32 Units under the skin once daily in the morning. Take as directed per insulin instructions. 15 mL 1 12/31/2024    insulin lispro (Admelog SoloStar U-100 Insulin) 100 unit/mL injection Inject per sliding scale instructions under the skin 3 times a day with meals. (Max daily dose 70 units) 15 mL 1 12/31/2024    tirzepatide (Mounjaro) 2.5 mg/0.5 mL pen injector Inject 2.5 mg under the skin every 7 days. 2 mL 0 12/30/2024    [DISCONTINUED] amLODIPine (Norvasc) 10 mg tablet Take 1 tablet (10 mg) by mouth once daily. 90 tablet 0 1/1/2025    [DISCONTINUED] aspirin 81 mg chewable tablet Chew 1 tablet (81 mg) once daily. 90 tablet 0 1/1/2025 Noon    [DISCONTINUED] atorvastatin (Lipitor) 40 mg tablet Take 1 tablet (40 mg) by mouth once daily. 90 tablet 0 1/1/2025    blood-glucose sensor (FreeStyle Remington 3 Sensor) device Use to monitor blood glucose. Change sensor every 14 days. 2 each 11     FreeStyle Remington 3 [place] misc Use as instructed to monitor blood glucose. 1 each 0     furosemide (Lasix) 20 mg tablet Take 1 tablet (20 mg) by mouth once daily.     "   meclizine (Antivert) 25 mg tablet,chewable Chew 1 tablet (25 mg) 3 times a day as needed (vertigo). (Patient not taking: Reported on 1/2/2025) 90 tablet 1 Not Taking    OneTouch Delica Plus Lancet 30 gauge misc USE TO TEST BLOOD SUGAR AS DIRECTED 200 each 0     OneTouch Ultra Test strip Use 1 strip BID to check glucose 200 strip 1     OneTouch Ultra2 Meter misc use 1 to 2 times daily 1 each 0     pen needle, diabetic 32 gauge x 5/32\" needle Use four times a day with insulin use 360 each 1        Vitals:      1/9/2025     6:09 AM 1/9/2025     1:40 AM 1/9/2025     1:00 AM 1/8/2025     9:10 PM 1/8/2025     5:11 PM 1/8/2025     1:48 PM 1/8/2025    11:00 AM   Vitals   Systolic 142 130  118 133 115 129   Diastolic 76 73  70 69 66 59   BP Location Left arm Left arm  Left arm Left arm Left arm Left arm   Heart Rate 76 82  81 79 74 75   Temp 36.2 °C (97.2 °F) 36.6 °C (97.9 °F)  36.4 °C (97.5 °F) 36.6 °C (97.9 °F) 36.5 °C (97.7 °F)    Resp 18 17  17 17 18 14   Weight (lb)   234.79       BMI   42.94 kg/m2       BSA (m2)   2.16 m2         Wt Readings from Last 5 Encounters:   01/09/25 107 kg (234 lb 12.6 oz)   11/19/24 99.8 kg (220 lb)   11/15/24 99.8 kg (220 lb 0.3 oz)   09/05/24 99.8 kg (220 lb)   09/04/24 102 kg (224 lb)       Intake/Output Summary (Last 24 hours) at 1/9/2025 0853  Last data filed at 1/9/2025 0609  Gross per 24 hour   Intake 1200 ml   Output 1800 ml   Net -600 ml       CHEST: Unlabored, Clear, Diminished  CV:  Normal sinus rhythm  NEURO:   RASS: Alert and calm  CAM:    LOC: Alert  Cognition: Appropriate judgement  GCS: 15    DATA:  CMP:  Recent Labs     01/08/25 1936 01/07/25 1930 01/06/25 2039 01/05/25  2008 01/04/25  2119 01/04/25  0700 01/03/25 2009 01/02/25  1835 04/24/24  1132 04/23/24  0803    139 141 135* 141 142 142 144   < > 138   K 4.2 4.2 4.5 4.6 4.2 4.1 4.2 4.6   < > 5.0    103 104 102 104 108* 109* 109*   < > 105   CO2 26 25 23 24 27 27 26 22   < > 23   ANIONGAP 14 15 19 14 14 " 11 11 18   < > 15   BUN 60* 57* 52* 46* 41* 36* 35* 29*   < > 36*   CREATININE 3.73* 3.82* 3.75* 2.93* 2.74* 2.55* 2.49* 2.31*   < > 1.64*   EGFR 13* 13* 13* 18* 19* 21* 22* 24*   < > 36*   MG 2.32 2.27 2.10 2.50* 2.04  --  2.06 2.50*  --  2.55*    < > = values in this interval not displayed.     Recent Labs     01/08/25  1936 01/07/25  1930 01/06/25  2039 01/05/25 2008 01/04/25  0700 01/03/25 2009 01/02/25  1835 01/02/25  1253 09/06/24  0120 04/17/24  0901 04/15/24  2128 02/07/24  0422 02/06/24  0735 02/04/24  0555 02/03/24  0122 01/26/24  1343 01/26/24  0358 01/25/24  1457 01/24/24  2032 01/23/24  1230 01/12/24  0603 01/11/24  0747 01/09/24  1210 08/13/23  1712 07/04/19  0707 04/02/19  1106 02/17/19  1207   ALBUMIN 3.2* 3.6 3.4 3.1* 2.9* 3.1* 3.9 3.6 3.3*   < > 3.5   < > 2.9*   < > 3.4   < > 2.7*   < > 3.4 4.1   < > 2.5*   < > 3.6   < > 3.9 3.8  3.8   ALT  --   --   --   --   --   --   --  12 10  --  23  --   --   --  22  --  19  --  25 16  --  6   < > 11   < > 12 10   AST  --   --   --   --   --   --   --  14 11  --  22  --   --   --  21  --  15  --  28 17  --  8   < > 11   < > 13 12   BILITOT  --   --   --   --   --   --   --  0.4 0.4  --  0.4  --   --   --  0.4  --  0.2  --  0.4 0.3  --  <0.2   < > 0.4   < > 0.5 0.4   LIPASE  --   --   --   --   --   --   --   --   --   --   --   --  5*  --   --   --   --   --   --   --   --   --   --  <3*  --  7* 3*    < > = values in this interval not displayed.     CBC:  Recent Labs     01/08/25 1936 01/07/25 1930 01/06/25 2039 01/05/25 2008 01/04/25 2119 01/03/25 2009 01/02/25  1835 01/02/25  1253   WBC 6.7 5.9 6.5 6.5 6.9 5.3 5.9 5.4   HGB 9.4* 9.9* 9.9* 9.3* 9.8* 9.4* 11.1* 11.1*   HCT 30.5* 32.1* 30.8* 27.9* 31.6* 30.0* 33.3* 33.1*    330 335 298 329 285 369 363   MCV 96 96 93 89 93 95 90 90     COAG:   Recent Labs     04/15/24  2128 01/29/24  1217 01/24/24  2032 10/21/19  1709 04/02/19  1106 02/17/19  1207   INR 0.9 1.1 1.2* 1.0 1.0 1.1     ABO:   Recent  Labs     04/17/24  0901   ABO A     HEME/ENDO:   Recent Labs     01/02/25  1835 11/19/24  1406 07/19/24  0852 04/16/24  0637 02/03/24  0122 01/26/24  0358 01/24/24 2032 01/18/24  0525 01/15/24  0606 09/14/23  1047 06/14/23  1016 05/08/23  1320 01/27/23  0840 01/26/23  1613   FERRITIN  --   --   --   --   --   --   --  276* 487*  --   --   --   --   --    IRONSAT  --   --   --   --   --   --   --  34 32  --  25  --   --   --    TSH  --   --   --   --  0.46 1.37  --   --   --   --  0.83  --   --  0.27*   FREET4  --   --   --   --   --   --   --   --   --   --   --   --  1.16 1.14   HGBA1C 8.7* 9* 11.2* 11.9*  --   --    < >  --   --    < >  --    < >  --   --     < > = values in this interval not displayed.     CARDIAC:   Recent Labs     01/04/25  2119 01/02/25  1253 02/03/24  0122 01/24/24 2032 01/25/23  1658 01/25/23  1616   TROPHS  --  21 12 25 7 8   * 376* 185* 311* 24  --      Recent Labs     05/08/23  0935 04/12/21  0906 10/20/20  1236   CHOL 174 161 181   LDLF 98 101* 115*   HDL 38.5* 38.9* 41.5   TRIG 189* 104 121     TOX:  Recent Labs     02/03/24  0800   AMPHETAMINE Presumptive Negative   BENZO Presumptive Negative   CANNABINOID Presumptive Negative   COCAI Presumptive Negative   FENTANYL Presumptive Negative   OPIATE Presumptive Negative   OXYCODONE Presumptive Negative   PCP Presumptive Negative     MICRO:   Recent Labs     02/05/24  0446 02/03/24  0953 02/03/24  0122 01/29/24  1217 01/25/24  0819 01/23/24  1230   CRP 1.29*  --  1.86*  --   --  0.21   PROCAL  --  0.05  --  0.05 0.14*  --      No results found for the last 90 days.      EKG:   Recent Labs     01/02/25  1225 09/06/24  0337 04/15/24  2155   ATRRATE 88 81 77   VENTRATE 88 81 77   PRINT 128 120 122   QRSDUR 82 86 86   QTCFRED 416 424 382   QTCCALCB 442 446 398     Encounter Date: 01/02/25   ECG 12 lead   Result Value    Ventricular Rate 88    Atrial Rate 88    NJ Interval 128    QRS Duration 82    QT Interval 366    QTC  Calculation(Bazett) 442    P Axis 58    R Axis 134    T Axis -1    QRS Count 14    Q Onset 221    P Onset 157    P Offset 214    T Offset 404    QTC Fredericia 416    Narrative    Normal sinus rhythm  Left posterior fascicular block  Cannot rule out Anterior infarct (cited on or before 15-ISAK-2024)  Abnormal ECG  When compared with ECG of 06-SEP-2024 03:33,  Left posterior fascicular block is now Present  Inverted T waves have replaced nonspecific T wave abnormality in Inferior leads    See ED provider note for full interpretation and clinical correlation  Confirmed by Kaye Jay ([id number]) on 1/3/2025 12:44:14 AM     Echocardiogram:   Recent Labs     01/03/25  1010 01/25/24  1100   EF 69  --    LVIDD 4.41 4.35   RV 45.2 32.4   RVFRWALLPKSP 10.00 9.57   TAPSE 2.5 2.0   Transthoracic Echo (TTE) Complete With Contrast 01/03/2025  Left Ventricle: Left ventricular ejection fraction is normal, calculated by Argueta's biplane at 69%. There are no regional left ventricular wall motion abnormalities. The left ventricular cavity size is normal. There is mildly increased septal and mildly increased posterior left ventricular wall thickness. There is left ventricular concentric remodeling. Spectral Doppler shows a Grade II (pseudonormal pattern) of left ventricular diastolic filling with an elevated left atrial pressure.  Left Atrium: The left atrium is moderately dilated.  Right Ventricle: The right ventricle is normal in size. There is normal right ventricular global systolic function.  Right Atrium: The right atrium is mildly dilated.  Aortic Valve: The aortic valve is trileaflet. There is minimal aortic valve cusp calcification. There is no evidence of aortic valve regurgitation. The peak instantaneous gradient of the aortic valve is 11 mmHg.  Mitral Valve: The mitral valve is normal in structure. There is moderate mitral valve regurgitation which is posteriorly directed. The mitral regurgitant orifice area is 31 mm2. The  mitral regurgitant volume is 55.50 ml. Mechanism of MR Is possibly restricted motion of the posterior leaflet.  Tricuspid Valve: The tricuspid valve is structurally normal. There is moderate tricuspid regurgitation. The Doppler estimated RVSP is moderately elevated at 45.2 mmHg.  Pulmonic Valve: The pulmonic valve is structurally normal. There is physiologic pulmonic valve regurgitation.  Pericardium: Small pericardial effusion. The effusion is circumferential.  Pleural: There is a small left pleural effusion.  Aorta: The aortic root is normal. The Ao Sinus is 2.90 cm. The Asc Ao is 3.30 cm.  Systemic Veins: The inferior vena cava appears normal in size, with IVC inspiratory collapse greater than 50%.  In comparison to the previous echocardiogram(s): Compared with study dated 1/25/2024, the degree of MR appears moderate today rather than mild on prior study. TR is also worsened.      CONCLUSIONS:  1. Left ventricular ejection fraction is normal, calculated by Argueta's biplane at 69%.  2. Spectral Doppler shows a Grade II (pseudonormal pattern) of left ventricular diastolic filling with an elevated left atrial pressure.  3. There is normal right ventricular global systolic function.  4. The left atrium is moderately dilated.  5. Moderate mitral valve regurgitation.  6. Mechanism of MR Is possibly restricted motion of the posterior leaflet.  7. Moderate tricuspid regurgitation visualized.  8. Moderately elevated right ventricular systolic pressure.  9. Small pericardial effusion.  10. Compared with study dated 1/25/2024, the degree of MR appears moderate today rather than mild on prior study. TR is also worsened.    QUANTITATIVE DATA SUMMARY:    2D MEASUREMENTS:          Normal Ranges:  LAs:             3.32 cm  (2.7-4.0cm)  RVIDd:           2.76 cm  (0.9-3.6cm)  IVSd:            1.10 cm  (0.6-1.1cm)  LVPWd:           0.96 cm  (0.6-1.1cm)  LVIDd:           4.41 cm  (3.9-5.9cm)  LVIDs:           2.83 cm  LV Mass  Index:   76 g/m2  LVEDV Index:     44 ml/m2  LV % FS          35.8 %      LA VOLUME:                    Normal Ranges:  LA Vol A4C:        88.4 ml    (22+/-6mL/m2)  LA Vol A2C:        79.6 ml  LA Vol BP:         85.3 ml  LA Vol Index A4C:  43.1ml/m2  LA Vol Index A2C:  38.8 ml/m2  LA Vol Index BP:   41.6 ml/m2  LA Area A4C:       25.6 cm2  LA Area A2C:       23.9 cm2  LA Major Axis A4C: 6.3 cm  LA Major Axis A2C: 6.1 cm  LA Volume Index:   35.2 ml/m2      RA VOLUME BY A/L METHOD:            Normal Ranges:  RA Vol A4C:              73.5 ml    (8.3-19.5ml)  RA Vol Index A4C:        35.8 ml/m2  RA Area A4C:             21.0 cm2  RA Major Axis A4C:       5.1 cm      AORTA MEASUREMENTS:         Normal Ranges:  Ao Sinus, d:        2.90 cm (2.1-3.5cm)  Asc Ao, d:          3.30 cm (2.1-3.4cm)      LV SYSTOLIC FUNCTION BY 2D PLANIMETRY (MOD):  Normal Ranges:  EF-A4C View:    75 % (>=55%)  EF-A2C View:    66 %  EF-Biplane:     69 %  LV EF Reported: 69 %      LV DIASTOLIC FUNCTION:             Normal Ranges:  MV Peak E:             1.19 m/s    (0.7-1.2 m/s)  MV Peak A:             1.00 m/s    (0.42-0.7 m/s)  E/A Ratio:             1.19        (1.0-2.2)  MV e'                  0.040 m/s   (>8.0)  MV lateral e'          0.05 m/s  MV medial e'           0.03 m/s  MV A Dur:              183.39 msec  E/e' Ratio:            29.68       (<8.0)  PulmV Sys Molina:         49.18 cm/s  PulmV Robles Molina:        57.26 cm/s  PulmV S/D Molina:         0.86  PulmV A Revs Molina:      21.81 cm/s  PulmV A Revs Dur:      114.19 msec      MITRAL VALVE:          Normal Ranges:  MV DT:        232 msec (150-240msec)      MITRAL INSUFFICIENCY:             Normal Ranges:  MR VTI:               180.58 cm  MR Vmax:              523.70 cm/s  MR Volume:            55.50 ml  MR Flow Rt:           160.96 ml/s  MR EROA:              31 mm2      AORTIC VALVE:            Normal Ranges:  AoV Vmax:      1.68 m/s  (<=1.7m/s)  AoV Peak P.3 mmHg (<20mmHg)  LVOT Max  Molina:  1.33 m/s  (<=1.1m/s)  LVOT VTI:      26.11 cm  LVOT Diameter: 1.79 cm   (1.8-2.4cm)  AoV Area,Vmax: 2.00 cm2  (2.5-4.5cm2)      RIGHT VENTRICLE:  RV Basal 3.50 cm  RV Mid   2.80 cm  RV Major 7.1 cm  TAPSE:   25.0 mm  RV s'    0.10 m/s      TRICUSPID VALVE/RVSP:          Normal Ranges:  Peak TR Velocity:     3.25 m/s  Est. RA Pressure:     3 mmHg  RV Syst Pressure:     45 mmHg  (< 30mmHg)  IVC Diam:             1.90 cm      PULMONIC VALVE:          Normal Ranges:  PV Accel Time:  131 msec (>120ms)  PV Max Molina:     1.0 m/s  (0.6-0.9m/s)  PV Max PG:      3.8 mmHg      Pulmonary Veins:  PulmV A Revs Dur: 114.19 msec  PulmV A Revs Molina: 21.81 cm/s  PulmV Robles Molina:   57.26 cm/s  PulmV S/D Molina:    0.86  PulmV Sys Molina:    49.18 cm/s      AORTA:  Asc Ao Diam 3.29 cm      [id number] Ninfa Pastor MD  Electronically signed on 1/3/2025 at 12:11:49 PM        ** Final **    Coronary Angiography: No results found for this or any previous visit from the past 1800 days.    Right Heart Cath: No results found for this or any previous visit from the past 1800 days.    Cardiac Scoring: No results found for this or any previous visit from the past 1800 days.    Cardiac MRI: No results found for this or any previous visit from the past 1800 days.    Nuclear:No results found for this or any previous visit from the past 1800 days.    Metabolic Stress: No results found for this or any previous visit from the past 1800 days.      Transthoracic Echo (TTE) Complete    Result Date: 1/3/2025   [facility], [address] [place] [address]                Tel [phone] and Fax [phone] TRANSTHORACIC ECHOCARDIOGRAM REPORT  Patient Name:       ALBERT RIDLEY      [place] Physician:    [id number] Ninfa Pastor MD Study Date:         1/3/2025            Ordering Provider:    [id number] KAR ANTHONY  MRN/PID:            [id number]            Fellow:               [id number] Abby Hess MD Accession#:         [id number]        Nurse: Date of Birth/Age:  1965 / 59      Sonographer:          Cassie Chopra RCS                     years Gender assigned at  F                   Additional Staff: Birth: Height:             157.48 cm           Admit Date: Weight:             107.05 kg           Admission Status:     Inpatient -                                                               Routine BSA / BMI:          2.05 m2 / 43.16     Encounter#:           [id number]                     kg/m2 Blood Pressure:     147/83 mmHg         Department Location:  [facility]                                                               Non Invasive Study Type:    TRANSTHORACIC ECHO (TTE) COMPLETE Diagnosis/ICD: Acute on chronic diastolic (congestive) heart failure                (CHF)-I50.33 Indication:    ADHF CPT Code:      Echo Complete w Full Doppler-[id number] Patient History: Diabetes:          Yes Pertinent History: Hyperlipidemia and PVD. ADHF, PAD. Study Detail: The following Echo studies were performed: 2D, M-Mode, Doppler and               color flow. Technically challenging study due to body habitus.               Definity used as a contrast agent for endocardial border               definition. Total contrast used for this procedure was 2 mL via IV               push.  PHYSICIAN INTERPRETATION: Left Ventricle: Left ventricular ejection fraction is normal, calculated by Argueta's biplane at 69%. There are no regional left ventricular wall motion abnormalities. The left ventricular cavity size is normal. There is mildly increased septal and mildly increased posterior left ventricular wall thickness. There is left ventricular concentric remodeling. Spectral Doppler shows a Grade II (pseudonormal pattern) of left ventricular diastolic filling with an elevated  left atrial pressure. Left Atrium: The left atrium is moderately dilated. Right Ventricle: The right ventricle is normal in size. There is normal right ventricular global systolic function. Right Atrium: The right atrium is mildly dilated. Aortic Valve: The aortic valve is trileaflet. There is minimal aortic valve cusp calcification. There is no evidence of aortic valve regurgitation. The peak instantaneous gradient of the aortic valve is 11 mmHg. Mitral Valve: The mitral valve is normal in structure. There is moderate mitral valve regurgitation which is posteriorly directed. The mitral regurgitant orifice area is 31 mm2. The mitral regurgitant volume is 55.50 ml. Mechanism of MR Is possibly restricted motion of the posterior leaflet. Tricuspid Valve: The tricuspid valve is structurally normal. There is moderate tricuspid regurgitation. The Doppler estimated RVSP is moderately elevated at 45.2 mmHg. Pulmonic Valve: The pulmonic valve is structurally normal. There is physiologic pulmonic valve regurgitation. Pericardium: Small pericardial effusion. The effusion is circumferential. Pleural: There is a small left pleural effusion. Aorta: The aortic root is normal. The Ao Sinus is 2.90 cm. The Asc Ao is 3.30 cm. Systemic Veins: The inferior vena cava appears normal in size, with IVC inspiratory collapse greater than 50%. In comparison to the previous echocardiogram(s): Compared with study dated 1/25/2024, the degree of MR appears moderate today rather than mild on prior study. TR is also worsened.  CONCLUSIONS:  1. Left ventricular ejection fraction is normal, calculated by Argueta's biplane at 69%.  2. Spectral Doppler shows a Grade II (pseudonormal pattern) of left ventricular diastolic filling with an elevated left atrial pressure.  3. There is normal right ventricular global systolic function.  4. The left atrium is moderately dilated.  5. Moderate mitral valve regurgitation.  6. Mechanism of MR Is possibly  restricted motion of the posterior leaflet.  7. Moderate tricuspid regurgitation visualized.  8. Moderately elevated right ventricular systolic pressure.  9. Small pericardial effusion. 10. Compared with study dated 1/25/2024, the degree of MR appears moderate today rather than mild on prior study. TR is also worsened. QUANTITATIVE DATA SUMMARY:  2D MEASUREMENTS:          Normal Ranges: LAs:             3.32 cm  (2.7-4.0cm) RVIDd:           2.76 cm  (0.9-3.6cm) IVSd:            1.10 cm  (0.6-1.1cm) LVPWd:           0.96 cm  (0.6-1.1cm) LVIDd:           4.41 cm  (3.9-5.9cm) LVIDs:           2.83 cm LV Mass Index:   76 g/m2 LVEDV Index:     44 ml/m2 LV % FS          35.8 %  LA VOLUME:                    Normal Ranges: LA Vol A4C:        88.4 ml    (22+/-6mL/m2) LA Vol A2C:        79.6 ml LA Vol BP:         85.3 ml LA Vol Index A4C:  43.1ml/m2 LA Vol Index A2C:  38.8 ml/m2 LA Vol Index BP:   41.6 ml/m2 LA Area A4C:       25.6 cm2 LA Area A2C:       23.9 cm2 LA Major Axis A4C: 6.3 cm LA Major Axis A2C: 6.1 cm LA Volume Index:   35.2 ml/m2  RA VOLUME BY A/L METHOD:            Normal Ranges: RA Vol A4C:              73.5 ml    (8.3-19.5ml) RA Vol Index A4C:        35.8 ml/m2 RA Area A4C:             21.0 cm2 RA Major Axis A4C:       5.1 cm  AORTA MEASUREMENTS:         Normal Ranges: Ao Sinus, d:        2.90 cm (2.1-3.5cm) Asc Ao, d:          3.30 cm (2.1-3.4cm)  LV SYSTOLIC FUNCTION BY 2D PLANIMETRY (MOD):                      Normal Ranges: EF-A4C View:    75 % (>=55%) EF-A2C View:    66 % EF-Biplane:     69 % LV EF Reported: 69 %  LV DIASTOLIC FUNCTION:             Normal Ranges: MV Peak E:             1.19 m/s    (0.7-1.2 m/s) MV Peak A:             1.00 m/s    (0.42-0.7 m/s) E/A Ratio:             1.19        (1.0-2.2) MV e'                  0.040 m/s   (>8.0) MV lateral e'          0.05 m/s MV medial e'           0.03 m/s MV A Dur:              183.39 msec E/e' Ratio:            29.68       (<8.0) PulmV Sys Molina:          49.18 cm/s PulmV Robles Molina:        57.26 cm/s PulmV S/D Molina:         0.86 PulmV A Revs Molina:      21.81 cm/s PulmV A Revs Dur:      114.19 msec  MITRAL VALVE:          Normal Ranges: MV DT:        232 msec (150-240msec)  MITRAL INSUFFICIENCY:             Normal Ranges: MR VTI:               180.58 cm MR Vmax:              523.70 cm/s MR Volume:            55.50 ml MR Flow Rt:           160.96 ml/s MR EROA:              31 mm2  AORTIC VALVE:            Normal Ranges: AoV Vmax:      1.68 m/s  (<=1.7m/s) AoV Peak P.3 mmHg (<20mmHg) LVOT Max Molina:  1.33 m/s  (<=1.1m/s) LVOT VTI:      26.11 cm LVOT Diameter: 1.79 cm   (1.8-2.4cm) AoV Area,Vmax: 2.00 cm2  (2.5-4.5cm2)  RIGHT VENTRICLE: RV Basal 3.50 cm RV Mid   2.80 cm RV Major 7.1 cm TAPSE:   25.0 mm RV s'    0.10 m/s  TRICUSPID VALVE/RVSP:          Normal Ranges: Peak TR Velocity:     3.25 m/s Est. RA Pressure:     3 mmHg RV Syst Pressure:     45 mmHg  (< 30mmHg) IVC Diam:             1.90 cm  PULMONIC VALVE:          Normal Ranges: PV Accel Time:  131 msec (>120ms) PV Max Molina:     1.0 m/s  (0.6-0.9m/s) PV Max PG:      3.8 mmHg  Pulmonary Veins: PulmV A Revs Dur: 114.19 msec PulmV A Revs Molina: 21.81 cm/s PulmV Robles Molina:   57.26 cm/s PulmV S/D Molina:    0.86 PulmV Sys Molina:    49.18 cm/s  AORTA: Asc Ao Diam 3.29 cm  [id number] Ninfa Pastor MD Electronically signed on 1/3/2025 at 12:11:49 PM  ** Final **     ECG 12 lead    Result Date: 1/3/2025  Normal sinus rhythm Left posterior fascicular block Cannot rule out Anterior infarct (cited on or before 15-ISAK-2024) Abnormal ECG When compared with ECG of 06-SEP-2024 03:33, Left posterior fascicular block is now Present Inverted T waves have replaced nonspecific T wave abnormality in Inferior leads See ED provider note for full interpretation and clinical correlation Confirmed by Kaye Jay ([id number]) on 1/3/2025 12:44:14 AM    XR chest 1 view    Result Date: 2025  STUDY: Chest Radiograph;  2025 12:59PM INDICATION:  Shortness of breath. COMPARISON: 9/6/2024 XR Chest. ACCESSION NUMBER(S): [id number] ORDERING CLINICIAN: RENETTA PALMA TECHNIQUE:  Frontal chest was obtained at 1258 hours. FINDINGS: Cardiac silhouette remains enlarged. There is pulmonary vascular congestion bilaterally. Small bibasal pleural effusions with associated atelectasis. No visible pneumothorax.     Constellation of findings is suggestive of congestive heart failure. Signed by Rupesh Borges MD    Point of Care Ultrasound    Result Date: 1/2/2025  Valdo Whatley DO     1/4/2025 11:13 AM Performed by: Valdo Whatley DO Authorized by: Valdo Whatley DO  Respiratory Indications: shortness of breath, tachypnea and hypoxia Procedure: Cardiac Ultrasound Findings:  Views: parasternal long, parasternal short and apical four Effusion: The pericardial space was visualized and was positive for a PERICARDIAL EFFUSION. Activity: Ventricular contractions were visualized. LV: LV systolic function was NORMAL. RV: RV size was NORMAL. Impression: Cardiac: The focused cardiac ultrasound exam had ABNORMAL findings as specified.     BI US breast limited left    Result Date: 12/26/2024  Interpreted By:  Jax Hagan, STUDY: BI US BREAST LIMITED LEFT;  12/26/2024 11:30 am   ACCESSION NUMBER(S): [id number]   ORDERING CLINICIAN: ROGER URBINA   INDICATION: Imaging follow-up to document resolution of a probable hematoma.   ,R92.8 Other abnormal and inconclusive findings on diagnostic imaging of breast   COMPARISON: 11/25/2020   FINDINGS: Targeted ultrasound was performed of the left breast by a registered sonographer with elastography.   The patient was initially recalled from a screening mammogram for the left breast finding.   Sonographic images were obtained of the left breast 23 cm from the nipple. The previously described finding has since resolved which is consistent with the suspected diagnosis of a hematoma. No new suspicious findings are seen. Elastography was also  performed which demonstrates no areas of increased stiffness.       Interval resolution of the previously described finding. Patient can return to screening.   BI-RADS CATEGORY: BI-RADS Category:  1 Negative. Recommendation:  Annual Screening. Recommended Date:  1 Year. Laterality:  Bilateral.   For any future breast imaging appointments, please call [phone]).     MACRO: None   Signed by: Jax Hagan 12/26/2024 11:36 AM Dictation workstation:   [id number]       LDA:       NUTRITION: Adult diet Regular  EMERGENCY CONTACT: Extended Emergency Contact Information  Primary Emergency Contact: Brandon Rousseau  Mobile Phone: [phone]  Relation: Significant Other  Secondary Emergency Contact: Kimberly Abbott  Mobile Phone: [phone]  Relation: Daughter   needed? No  CODE STATUS: Full Code  DISPO: Discharge Planning  Living Arrangements: Spouse/significant other  Support Systems: Spouse/significant other  Assistance Needed: None  Type of Residence: Private residence  Number of Stairs to Enter Residence: 5  Number of Stairs Within Residence: 0  Do you have animals or pets at home?: Yes  Type of Animals or Pets: dog  Who is requesting discharge planning?: Provider  Home or Post Acute Services: None  Type of Home Care Services: Home nursing visits  Expected Discharge Disposition: Home  Does the patient need discharge transport arranged?: No  AMPAC: Daily Activity - Total Score: 23    FOLLOWUP:   Future Appointments   Date Time Provider Department Center   1/14/2025  2:20 PM Amanda Joe, PharmD [id number]   1/17/2025 10:00 AM Marylin Sparks MD MPH [id number] [facility]   1/20/2025 11:30 AM Marcin Motta MD [id number] [facility]   2/5/2025  9:45 AM Barrera Rodriguez MD [id number] [facility]   2/20/2025 10:30 AM Mone Aquino MD [id number] [facility]

## 2025-01-10 LAB
ALBUMIN SERPL BCP-MCNC: 3.3 G/DL (ref 3.4–5)
ANION GAP SERPL CALC-SCNC: 17 MMOL/L (ref 10–20)
BUN SERPL-MCNC: 69 MG/DL (ref 6–23)
CALCIUM SERPL-MCNC: 8.8 MG/DL (ref 8.6–10.6)
CHLORIDE SERPL-SCNC: 101 MMOL/L (ref 98–107)
CO2 SERPL-SCNC: 25 MMOL/L (ref 21–32)
CREAT SERPL-MCNC: 3.85 MG/DL (ref 0.5–1.05)
EGFRCR SERPLBLD CKD-EPI 2021: 13 ML/MIN/1.73M*2
ERYTHROCYTE [DISTWIDTH] IN BLOOD BY AUTOMATED COUNT: 14.4 % (ref 11.5–14.5)
GLUCOSE BLD MANUAL STRIP-MCNC: 140 MG/DL (ref 74–99)
GLUCOSE BLD MANUAL STRIP-MCNC: 150 MG/DL (ref 74–99)
GLUCOSE BLD MANUAL STRIP-MCNC: 199 MG/DL (ref 74–99)
GLUCOSE BLD MANUAL STRIP-MCNC: 246 MG/DL (ref 74–99)
GLUCOSE SERPL-MCNC: 235 MG/DL (ref 74–99)
HCT VFR BLD AUTO: 29.1 % (ref 36–46)
HGB BLD-MCNC: 8.9 G/DL (ref 12–16)
MAGNESIUM SERPL-MCNC: 2.36 MG/DL (ref 1.6–2.4)
MCH RBC QN AUTO: 30 PG (ref 26–34)
MCHC RBC AUTO-ENTMCNC: 30.6 G/DL (ref 32–36)
MCV RBC AUTO: 98 FL (ref 80–100)
NRBC BLD-RTO: 0 /100 WBCS (ref 0–0)
PHOSPHATE SERPL-MCNC: 5.9 MG/DL (ref 2.5–4.9)
PLATELET # BLD AUTO: 330 X10*3/UL (ref 150–450)
POTASSIUM SERPL-SCNC: 4.7 MMOL/L (ref 3.5–5.3)
RBC # BLD AUTO: 2.97 X10*6/UL (ref 4–5.2)
SODIUM SERPL-SCNC: 138 MMOL/L (ref 136–145)
WBC # BLD AUTO: 6.6 X10*3/UL (ref 4.4–11.3)

## 2025-01-10 PROCEDURE — 80069 RENAL FUNCTION PANEL: CPT | Performed by: NURSE PRACTITIONER

## 2025-01-10 PROCEDURE — 2500000001 HC RX 250 WO HCPCS SELF ADMINISTERED DRUGS (ALT 637 FOR MEDICARE OP)

## 2025-01-10 PROCEDURE — 2500000002 HC RX 250 W HCPCS SELF ADMINISTERED DRUGS (ALT 637 FOR MEDICARE OP, ALT 636 FOR OP/ED)

## 2025-01-10 PROCEDURE — 36415 COLL VENOUS BLD VENIPUNCTURE: CPT | Performed by: NURSE PRACTITIONER

## 2025-01-10 PROCEDURE — 2500000001 HC RX 250 WO HCPCS SELF ADMINISTERED DRUGS (ALT 637 FOR MEDICARE OP): Performed by: NURSE PRACTITIONER

## 2025-01-10 PROCEDURE — 2500000005 HC RX 250 GENERAL PHARMACY W/O HCPCS: Performed by: STUDENT IN AN ORGANIZED HEALTH CARE EDUCATION/TRAINING PROGRAM

## 2025-01-10 PROCEDURE — 2500000004 HC RX 250 GENERAL PHARMACY W/ HCPCS (ALT 636 FOR OP/ED): Performed by: NURSE PRACTITIONER

## 2025-01-10 PROCEDURE — 2500000001 HC RX 250 WO HCPCS SELF ADMINISTERED DRUGS (ALT 637 FOR MEDICARE OP): Performed by: STUDENT IN AN ORGANIZED HEALTH CARE EDUCATION/TRAINING PROGRAM

## 2025-01-10 PROCEDURE — 2500000002 HC RX 250 W HCPCS SELF ADMINISTERED DRUGS (ALT 637 FOR MEDICARE OP, ALT 636 FOR OP/ED): Performed by: NURSE PRACTITIONER

## 2025-01-10 PROCEDURE — 99232 SBSQ HOSP IP/OBS MODERATE 35: CPT | Performed by: STUDENT IN AN ORGANIZED HEALTH CARE EDUCATION/TRAINING PROGRAM

## 2025-01-10 PROCEDURE — 85027 COMPLETE CBC AUTOMATED: CPT | Performed by: NURSE PRACTITIONER

## 2025-01-10 PROCEDURE — 83735 ASSAY OF MAGNESIUM: CPT | Performed by: NURSE PRACTITIONER

## 2025-01-10 PROCEDURE — 1200000002 HC GENERAL ROOM WITH TELEMETRY DAILY

## 2025-01-10 PROCEDURE — 82947 ASSAY GLUCOSE BLOOD QUANT: CPT

## 2025-01-10 RX ADMIN — ISOSORBIDE DINITRATE 10 MG: 10 TABLET ORAL at 14:39

## 2025-01-10 RX ADMIN — FUROSEMIDE 15 MG/HR: 10 INJECTION, SOLUTION INTRAMUSCULAR; INTRAVENOUS at 12:14

## 2025-01-10 RX ADMIN — INSULIN GLARGINE 16 UNITS: 100 INJECTION, SOLUTION SUBCUTANEOUS at 09:09

## 2025-01-10 RX ADMIN — HYDRALAZINE HYDROCHLORIDE 50 MG: 50 TABLET ORAL at 09:08

## 2025-01-10 RX ADMIN — GABAPENTIN 300 MG: 300 CAPSULE ORAL at 09:08

## 2025-01-10 RX ADMIN — HEPARIN SODIUM 7500 UNITS: 5000 INJECTION INTRAVENOUS; SUBCUTANEOUS at 05:51

## 2025-01-10 RX ADMIN — AMLODIPINE BESYLATE 10 MG: 10 TABLET ORAL at 09:08

## 2025-01-10 RX ADMIN — FOLIC ACID 1 MG: 1 TABLET ORAL at 09:08

## 2025-01-10 RX ADMIN — HYDRALAZINE HYDROCHLORIDE 50 MG: 50 TABLET ORAL at 14:39

## 2025-01-10 RX ADMIN — ATORVASTATIN CALCIUM 40 MG: 40 TABLET, FILM COATED ORAL at 20:01

## 2025-01-10 RX ADMIN — ISOSORBIDE DINITRATE 10 MG: 10 TABLET ORAL at 18:35

## 2025-01-10 RX ADMIN — Medication 2 L/MIN: at 02:22

## 2025-01-10 RX ADMIN — HEPARIN SODIUM 7500 UNITS: 5000 INJECTION INTRAVENOUS; SUBCUTANEOUS at 14:39

## 2025-01-10 RX ADMIN — HEPARIN SODIUM 7500 UNITS: 5000 INJECTION INTRAVENOUS; SUBCUTANEOUS at 22:03

## 2025-01-10 RX ADMIN — FUROSEMIDE 80 MG: 10 INJECTION, SOLUTION INTRAVENOUS at 09:08

## 2025-01-10 RX ADMIN — NEPHROCAP 1 CAPSULE: 1 CAP ORAL at 09:08

## 2025-01-10 RX ADMIN — INSULIN LISPRO 2 UNITS: 100 INJECTION, SOLUTION INTRAVENOUS; SUBCUTANEOUS at 09:09

## 2025-01-10 RX ADMIN — ASPIRIN 81 MG: 81 TABLET, CHEWABLE ORAL at 09:09

## 2025-01-10 RX ADMIN — GABAPENTIN 300 MG: 300 CAPSULE ORAL at 20:02

## 2025-01-10 RX ADMIN — HYDRALAZINE HYDROCHLORIDE 50 MG: 50 TABLET ORAL at 20:02

## 2025-01-10 RX ADMIN — HEPARIN SODIUM 7500 UNITS: 5000 INJECTION INTRAVENOUS; SUBCUTANEOUS at 00:04

## 2025-01-10 RX ADMIN — ISOSORBIDE DINITRATE 10 MG: 10 TABLET ORAL at 09:14

## 2025-01-10 RX ADMIN — POLYETHYLENE GLYCOL 3350 17 G: 17 POWDER, FOR SOLUTION ORAL at 09:08

## 2025-01-10 ASSESSMENT — COGNITIVE AND FUNCTIONAL STATUS - GENERAL
MOBILITY SCORE: 23
DAILY ACTIVITIY SCORE: 24
MOBILITY SCORE: 23
DAILY ACTIVITIY SCORE: 24
CLIMB 3 TO 5 STEPS WITH RAILING: A LITTLE
CLIMB 3 TO 5 STEPS WITH RAILING: A LITTLE

## 2025-01-10 ASSESSMENT — PAIN SCALES - GENERAL
PAINLEVEL_OUTOF10: 0 - NO PAIN

## 2025-01-10 ASSESSMENT — PAIN - FUNCTIONAL ASSESSMENT
PAIN_FUNCTIONAL_ASSESSMENT: 0-10

## 2025-01-10 NOTE — PROGRESS NOTES
[facility] Attending Shared Visit Note    This is a shared visit. Please see Advanced Practice Provider's encounter note for additional details.      Briefly, 59-year-old female from [place] with PMH of poorly controlled insulin-dependent T2DM c/b neuropathy, hyperlipidemia, PAD, CKD IV, HTN, multiple foot infections s/p partial R great toe amputation, fall w/ L femur fracture s/p ORIF (4/2024) presenting with SOB and HTN urgency. On presentation, reated with BiPAP, IV Lasix, hydralazine, and isordil. Now weaned to RA, BNP down, BP labile but trending lower. Renal function labile and worsened with diuresis    Echo showed EF 69%, moderate MR/TR, elevated LAP, and small pericardial effusion.   RHC obtained in setting of WRF:  RA 20, RVSP 72, PA 72/24 (40), W: poor waveform, ?24. CO 10.4/5.1.           Exam: Breathing unlabored. Regular. Warm. 1+ edema    Plan:   - Amlodipine, Hydralazine, Isosorbide dinitrate  - Spironolactone and SGLT2i: Not initiated due to current renal function (CKD IV)  - Diuretics: IV diuresis with Lasix drip.  She remains quite volume overloaded.  Will need to monitor renal function closely if continues to worsen will involve nephrology.    Dispo: Discharge pending further HF med optimization and close outpatient follow-up.    Luis Mckee MD    Objective   Admit Date: 1/2/2025  Hospital Length of Stay: 8   Home: [place] [id number]    MEDICATIONS  Infusions:     Scheduled:  amLODIPine, 10 mg, Daily  aspirin, 81 mg, Daily  atorvastatin, 40 mg, Nightly  [Held by provider] empagliflozin, 10 mg, Daily  folic acid, 1 mg, Daily  furosemide, 80 mg, BID  gabapentin, 300 mg, BID  heparin (porcine), 7,500 Units, q8h NEHEMIAS  hydrALAZINE, 50 mg, TID  insulin glargine, 16 Units, Daily  insulin lispro, 0-10 Units, TID AC  isosorbide dinitrate, 10 mg, TID  polyethylene glycol, 17 g, Daily  vitamin B complex-vitamin C-folic acid, 1 capsule, Daily      PRN:  acetaminophen, 650 mg, q6h PRN  albuterol, 2.5 mg,  q6h PRN  dextrose, 12.5 g, q15 min PRN  dextrose, 25 g, q15 min PRN  glucagon, 1 mg, q15 min PRN  glucagon, 1 mg, q15 min PRN  hydrALAZINE, 5 mg, q6h PRN  oxygen, , Continuous PRN - O2/gases  oxygen, , Continuous PRN - O2/gases      Prior to Admission Meds:  Medications Prior to Admission   Medication Sig Dispense Refill Last Dose/Taking    acetaminophen (Tylenol) 325 mg tablet Take 2 tablets (650 mg) by mouth every 4 hours if needed for mild pain (1 - 3).   Past Month    albuterol 90 mcg/actuation inhaler Inhale 2 puffs every 6 hours if needed for wheezing. 6.7 g 0 Past Week    b complex vitamins capsule Take 1 capsule by mouth once daily.   1/1/2025    cloNIDine (Catapres) 0.1 mg tablet Take 1 tablet (0.1 mg) by mouth 2 times a day. 180 tablet 0 1/1/2025    folic acid (Folvite) 1 mg tablet Take 1 tablet (1 mg) by mouth once daily.   1/1/2025    gabapentin (Neurontin) 300 mg capsule Take 1 capsule (300 mg) by mouth 2 times a day. 180 capsule 0 1/1/2025    insulin glargine (Basaglar KwikPen U-100 Insulin) 100 unit/mL (3 mL) pen Inject 32 Units under the skin once daily in the morning. Take as directed per insulin instructions. 15 mL 1 12/31/2024    insulin lispro (Admelog SoloStar U-100 Insulin) 100 unit/mL injection Inject per sliding scale instructions under the skin 3 times a day with meals. (Max daily dose 70 units) 15 mL 1 12/31/2024    tirzepatide (Mounjaro) 2.5 mg/0.5 mL pen injector Inject 2.5 mg under the skin every 7 days. 2 mL 0 12/30/2024    [DISCONTINUED] amLODIPine (Norvasc) 10 mg tablet Take 1 tablet (10 mg) by mouth once daily. 90 tablet 0 1/1/2025    [DISCONTINUED] aspirin 81 mg chewable tablet Chew 1 tablet (81 mg) once daily. 90 tablet 0 1/1/2025 Noon    [DISCONTINUED] atorvastatin (Lipitor) 40 mg tablet Take 1 tablet (40 mg) by mouth once daily. 90 tablet 0 1/1/2025    blood-glucose sensor (FreeStyle Remington 3 Sensor) device Use to monitor blood glucose. Change sensor every 14 days. 2 each 11      "FreeStyle Remington 3 [place] misc Use as instructed to monitor blood glucose. 1 each 0     furosemide (Lasix) 20 mg tablet Take 1 tablet (20 mg) by mouth once daily.       meclizine (Antivert) 25 mg tablet,chewable Chew 1 tablet (25 mg) 3 times a day as needed (vertigo). (Patient not taking: Reported on 1/2/2025) 90 tablet 1 Not Taking    OneTouch Delica Plus Lancet 30 gauge misc USE TO TEST BLOOD SUGAR AS DIRECTED 200 each 0     OneTouch Ultra Test strip Use 1 strip BID to check glucose 200 strip 1     OneTouch Ultra2 Meter misc use 1 to 2 times daily 1 each 0     pen needle, diabetic 32 gauge x 5/32\" needle Use four times a day with insulin use 360 each 1        Vitals:      1/10/2025     5:57 AM 1/10/2025     2:53 AM 1/10/2025     2:21 AM 1/9/2025     9:10 PM 1/9/2025     6:17 PM 1/9/2025     1:43 PM 1/9/2025     9:46 AM   Vitals   Systolic 112  104 162 147 117 145   Diastolic 71  68 67 79 72 71   BP Location Left arm  Left arm Left arm Left arm Left arm Left arm   Heart Rate 86  78 80 74 70 74   Temp 36.5 °C (97.7 °F)  37 °C (98.6 °F) 36.7 °C (98.1 °F) 36.2 °C (97.2 °F) 36.2 °C (97.2 °F) 36.3 °C (97.3 °F)   Resp 18  18 17 16 16 16   Weight (lb)  237.3        BMI  43.4 kg/m2        BSA (m2)  2.17 m2          Wt Readings from Last 5 Encounters:   01/10/25 108 kg (237 lb 4.8 oz)   11/19/24 99.8 kg (220 lb)   11/15/24 99.8 kg (220 lb 0.3 oz)   09/05/24 99.8 kg (220 lb)   09/04/24 102 kg (224 lb)       Intake/Output Summary (Last 24 hours) at 1/10/2025 0904  Last data filed at 1/10/2025 0253  Gross per 24 hour   Intake 540 ml   Output 2000 ml   Net -1460 ml       CHEST: Unlabored, Clear, Diminished  CV:  Normal sinus rhythm  NEURO:   RASS: Alert and calm  CAM:    LOC: Alert  Cognition: Appropriate judgement  GCS: 15    DATA:  CMP:  Recent Labs     01/09/25 1913 01/08/25  1936 01/07/25  1930 01/06/25 2039 01/05/25 2008 01/04/25  2119 01/04/25  0700 01/03/25 2009 01/02/25  1835    140 139 141 135* 141 142 142 144 "   K 4.8 4.2 4.2 4.5 4.6 4.2 4.1 4.2 4.6    104 103 104 102 104 108* 109* 109*   CO2 24 26 25 23 24 27 27 26 22   ANIONGAP 17 14 15 19 14 14 11 11 18   BUN 62* 60* 57* 52* 46* 41* 36* 35* 29*   CREATININE 3.61* 3.73* 3.82* 3.75* 2.93* 2.74* 2.55* 2.49* 2.31*   EGFR 14* 13* 13* 13* 18* 19* 21* 22* 24*   MG 2.40 2.32 2.27 2.10 2.50* 2.04  --  2.06 2.50*     Recent Labs     01/09/25  1913 01/08/25  1936 01/07/25  1930 01/06/25  2039 01/05/25 2008 01/04/25  0700 01/03/25  2009 01/02/25  1835 01/02/25  1253 09/06/24  0120 04/17/24  0901 04/15/24  2128 02/07/24  0422 02/06/24  0735 02/04/24  0555 02/03/24  0122 01/26/24  1343 01/26/24  0358 01/25/24  1457 01/24/24  2032 01/23/24  1230 01/12/24  0603 01/11/24  0747 01/09/24  1210 08/13/23  1712 07/04/19  0707 04/02/19  1106 02/17/19  1207   ALBUMIN 3.7 3.2* 3.6 3.4 3.1* 2.9* 3.1* 3.9 3.6 3.3*   < > 3.5   < > 2.9*   < > 3.4   < > 2.7*   < > 3.4 4.1   < > 2.5*   < > 3.6   < > 3.9 3.8  3.8   ALT  --   --   --   --   --   --   --   --  12 10  --  23  --   --   --  22  --  19  --  25 16  --  6   < > 11   < > 12 10   AST  --   --   --   --   --   --   --   --  14 11  --  22  --   --   --  21  --  15  --  28 17  --  8   < > 11   < > 13 12   BILITOT  --   --   --   --   --   --   --   --  0.4 0.4  --  0.4  --   --   --  0.4  --  0.2  --  0.4 0.3  --  <0.2   < > 0.4   < > 0.5 0.4   LIPASE  --   --   --   --   --   --   --   --   --   --   --   --   --  5*  --   --   --   --   --   --   --   --   --   --  <3*  --  7* 3*    < > = values in this interval not displayed.     CBC:  Recent Labs     01/09/25  1913 01/08/25  1936 01/07/25  1930 01/06/25 2039 01/05/25 2008 01/04/25 2119 01/03/25 2009 01/02/25  1835   WBC 5.5 6.7 5.9 6.5 6.5 6.9 5.3 5.9   HGB 9.6* 9.4* 9.9* 9.9* 9.3* 9.8* 9.4* 11.1*   HCT 30.7* 30.5* 32.1* 30.8* 27.9* 31.6* 30.0* 33.3*    326 330 335 298 329 285 369   MCV 95 96 96 93 89 93 95 90     COAG:   Recent Labs     04/15/24  2128 01/29/24  1217  01/24/24  2032 10/21/19  1709 04/02/19  1106 02/17/19  1207   INR 0.9 1.1 1.2* 1.0 1.0 1.1     ABO:   Recent Labs     04/17/24  0901   ABO A     HEME/ENDO:   Recent Labs     01/02/25  1835 11/19/24  1406 07/19/24  0852 04/16/24  0637 02/03/24  0122 01/26/24  0358 01/24/24 2032 01/18/24  0525 01/15/24  0606 09/14/23  1047 06/14/23  1016 05/08/23  1320 01/27/23  0840 01/26/23  1613   FERRITIN  --   --   --   --   --   --   --  276* 487*  --   --   --   --   --    IRONSAT  --   --   --   --   --   --   --  34 32  --  25  --   --   --    TSH  --   --   --   --  0.46 1.37  --   --   --   --  0.83  --   --  0.27*   FREET4  --   --   --   --   --   --   --   --   --   --   --   --  1.16 1.14   HGBA1C 8.7* 9* 11.2* 11.9*  --   --    < >  --   --    < >  --    < >  --   --     < > = values in this interval not displayed.     CARDIAC:   Recent Labs     01/04/25  2119 01/02/25  1253 02/03/24  0122 01/24/24 2032 01/25/23  1658 01/25/23  1616   TROPHS  --  21 12 25 7 8   * 376* 185* 311* 24  --      Recent Labs     05/08/23  0935 04/12/21  0906 10/20/20  1236   CHOL 174 161 181   LDLF 98 101* 115*   HDL 38.5* 38.9* 41.5   TRIG 189* 104 121     TOX:  Recent Labs     02/03/24  0800   AMPHETAMINE Presumptive Negative   BENZO Presumptive Negative   CANNABINOID Presumptive Negative   COCAI Presumptive Negative   FENTANYL Presumptive Negative   OPIATE Presumptive Negative   OXYCODONE Presumptive Negative   PCP Presumptive Negative     MICRO:   Recent Labs     02/05/24  0446 02/03/24  0953 02/03/24  0122 01/29/24  1217 01/25/24  0819 01/23/24  1230   CRP 1.29*  --  1.86*  --   --  0.21   PROCAL  --  0.05  --  0.05 0.14*  --      No results found for the last 90 days.      EKG:   Recent Labs     01/02/25  1225 09/06/24  0337 04/15/24  2155   ATRRATE 88 81 77   VENTRATE 88 81 77   PRINT 128 120 122   QRSDUR 82 86 86   QTCFRED 416 424 382   QTCCALCB 442 446 398     Encounter Date: 01/02/25   ECG 12 lead   Result Value     Ventricular Rate 88    Atrial Rate 88    VA Interval 128    QRS Duration 82    QT Interval 366    QTC Calculation(Bazett) 442    P Axis 58    R Axis 134    T Axis -1    QRS Count 14    Q Onset 221    P Onset 157    P Offset 214    T Offset 404    QTC Fredericia 416    Narrative    Normal sinus rhythm  Left posterior fascicular block  Cannot rule out Anterior infarct (cited on or before 15-ISAK-2024)  Abnormal ECG  When compared with ECG of 06-SEP-2024 03:33,  Left posterior fascicular block is now Present  Inverted T waves have replaced nonspecific T wave abnormality in Inferior leads    See ED provider note for full interpretation and clinical correlation  Confirmed by Kaye Jay ([id number]) on 1/3/2025 12:44:14 AM     Echocardiogram:   Recent Labs     01/03/25  1010 01/25/24  1100   EF 69  --    LVIDD 4.41 4.35   RV 45.2 32.4   RVFRWALLPKSP 10.00 9.57   TAPSE 2.5 2.0   Transthoracic Echo (TTE) Complete With Contrast 01/03/2025  Left Ventricle: Left ventricular ejection fraction is normal, calculated by Argueta's biplane at 69%. There are no regional left ventricular wall motion abnormalities. The left ventricular cavity size is normal. There is mildly increased septal and mildly increased posterior left ventricular wall thickness. There is left ventricular concentric remodeling. Spectral Doppler shows a Grade II (pseudonormal pattern) of left ventricular diastolic filling with an elevated left atrial pressure.  Left Atrium: The left atrium is moderately dilated.  Right Ventricle: The right ventricle is normal in size. There is normal right ventricular global systolic function.  Right Atrium: The right atrium is mildly dilated.  Aortic Valve: The aortic valve is trileaflet. There is minimal aortic valve cusp calcification. There is no evidence of aortic valve regurgitation. The peak instantaneous gradient of the aortic valve is 11 mmHg.  Mitral Valve: The mitral valve is normal in structure. There is moderate mitral  valve regurgitation which is posteriorly directed. The mitral regurgitant orifice area is 31 mm2. The mitral regurgitant volume is 55.50 ml. Mechanism of MR Is possibly restricted motion of the posterior leaflet.  Tricuspid Valve: The tricuspid valve is structurally normal. There is moderate tricuspid regurgitation. The Doppler estimated RVSP is moderately elevated at 45.2 mmHg.  Pulmonic Valve: The pulmonic valve is structurally normal. There is physiologic pulmonic valve regurgitation.  Pericardium: Small pericardial effusion. The effusion is circumferential.  Pleural: There is a small left pleural effusion.  Aorta: The aortic root is normal. The Ao Sinus is 2.90 cm. The Asc Ao is 3.30 cm.  Systemic Veins: The inferior vena cava appears normal in size, with IVC inspiratory collapse greater than 50%.  In comparison to the previous echocardiogram(s): Compared with study dated 1/25/2024, the degree of MR appears moderate today rather than mild on prior study. TR is also worsened.      CONCLUSIONS:  1. Left ventricular ejection fraction is normal, calculated by Argueta's biplane at 69%.  2. Spectral Doppler shows a Grade II (pseudonormal pattern) of left ventricular diastolic filling with an elevated left atrial pressure.  3. There is normal right ventricular global systolic function.  4. The left atrium is moderately dilated.  5. Moderate mitral valve regurgitation.  6. Mechanism of MR Is possibly restricted motion of the posterior leaflet.  7. Moderate tricuspid regurgitation visualized.  8. Moderately elevated right ventricular systolic pressure.  9. Small pericardial effusion.  10. Compared with study dated 1/25/2024, the degree of MR appears moderate today rather than mild on prior study. TR is also worsened.    QUANTITATIVE DATA SUMMARY:    2D MEASUREMENTS:          Normal Ranges:  LAs:             3.32 cm  (2.7-4.0cm)  RVIDd:           2.76 cm  (0.9-3.6cm)  IVSd:            1.10 cm  (0.6-1.1cm)  LVPWd:            0.96 cm  (0.6-1.1cm)  LVIDd:           4.41 cm  (3.9-5.9cm)  LVIDs:           2.83 cm  LV Mass Index:   76 g/m2  LVEDV Index:     44 ml/m2  LV % FS          35.8 %      LA VOLUME:                    Normal Ranges:  LA Vol A4C:        88.4 ml    (22+/-6mL/m2)  LA Vol A2C:        79.6 ml  LA Vol BP:         85.3 ml  LA Vol Index A4C:  43.1ml/m2  LA Vol Index A2C:  38.8 ml/m2  LA Vol Index BP:   41.6 ml/m2  LA Area A4C:       25.6 cm2  LA Area A2C:       23.9 cm2  LA Major Axis A4C: 6.3 cm  LA Major Axis A2C: 6.1 cm  LA Volume Index:   35.2 ml/m2      RA VOLUME BY A/L METHOD:            Normal Ranges:  RA Vol A4C:              73.5 ml    (8.3-19.5ml)  RA Vol Index A4C:        35.8 ml/m2  RA Area A4C:             21.0 cm2  RA Major Axis A4C:       5.1 cm      AORTA MEASUREMENTS:         Normal Ranges:  Ao Sinus, d:        2.90 cm (2.1-3.5cm)  Asc Ao, d:          3.30 cm (2.1-3.4cm)      LV SYSTOLIC FUNCTION BY 2D PLANIMETRY (MOD):  Normal Ranges:  EF-A4C View:    75 % (>=55%)  EF-A2C View:    66 %  EF-Biplane:     69 %  LV EF Reported: 69 %      LV DIASTOLIC FUNCTION:             Normal Ranges:  MV Peak E:             1.19 m/s    (0.7-1.2 m/s)  MV Peak A:             1.00 m/s    (0.42-0.7 m/s)  E/A Ratio:             1.19        (1.0-2.2)  MV e'                  0.040 m/s   (>8.0)  MV lateral e'          0.05 m/s  MV medial e'           0.03 m/s  MV A Dur:              183.39 msec  E/e' Ratio:            29.68       (<8.0)  PulmV Sys Molina:         49.18 cm/s  PulmV Robles Molina:        57.26 cm/s  PulmV S/D Molina:         0.86  PulmV A Revs Molina:      21.81 cm/s  PulmV A Revs Dur:      114.19 msec      MITRAL VALVE:          Normal Ranges:  MV DT:        232 msec (150-240msec)      MITRAL INSUFFICIENCY:             Normal Ranges:  MR VTI:               180.58 cm  MR Vmax:              523.70 cm/s  MR Volume:            55.50 ml  MR Flow Rt:           160.96 ml/s  MR EROA:              31 mm2      AORTIC VALVE:             Normal Ranges:  AoV Vmax:      1.68 m/s  (<=1.7m/s)  AoV Peak P.3 mmHg (<20mmHg)  LVOT Max Molina:  1.33 m/s  (<=1.1m/s)  LVOT VTI:      26.11 cm  LVOT Diameter: 1.79 cm   (1.8-2.4cm)  AoV Area,Vmax: 2.00 cm2  (2.5-4.5cm2)      RIGHT VENTRICLE:  RV Basal 3.50 cm  RV Mid   2.80 cm  RV Major 7.1 cm  TAPSE:   25.0 mm  RV s'    0.10 m/s      TRICUSPID VALVE/RVSP:          Normal Ranges:  Peak TR Velocity:     3.25 m/s  Est. RA Pressure:     3 mmHg  RV Syst Pressure:     45 mmHg  (< 30mmHg)  IVC Diam:             1.90 cm      PULMONIC VALVE:          Normal Ranges:  PV Accel Time:  131 msec (>120ms)  PV Max Molina:     1.0 m/s  (0.6-0.9m/s)  PV Max PG:      3.8 mmHg      Pulmonary Veins:  PulmV A Revs Dur: 114.19 msec  PulmV A Revs Molina: 21.81 cm/s  PulmV Robles Molina:   57.26 cm/s  PulmV S/D Molina:    0.86  PulmV Sys Molina:    49.18 cm/s      AORTA:  Asc Ao Diam 3.29 cm      [id number] Ninfa Pastor MD  Electronically signed on 1/3/2025 at 12:11:49 PM        ** Final **    Coronary Angiography: No results found for this or any previous visit from the past 1800 days.    Right Heart Cath: No results found for this or any previous visit from the past 1800 days.    Cardiac Scoring: No results found for this or any previous visit from the past 1800 days.    Cardiac MRI: No results found for this or any previous visit from the past 1800 days.    Nuclear:No results found for this or any previous visit from the past 1800 days.    Metabolic Stress: No results found for this or any previous visit from the past 1800 days.      Transthoracic Echo (TTE) Complete    Result Date: 1/3/2025   [facility], [address] [place] [address]                Tel [phone] and Fax [phone] TRANSTHORACIC ECHOCARDIOGRAM REPORT  Patient Name:       ALBERT Mullins Physician:    [id number] Ninfa Pastor MD Study Date:         1/3/2025            Ordering  Provider:    [id number] KAR ANTHONY MRN/PID:            [id number]            Fellow:               [id number] Abby Hess MD Accession#:         [id number]        Nurse: Date of Birth/Age:  1965 / 59      Sonographer:          Cassie Chopra RCS                     years Gender assigned at  F                   Additional Staff: Birth: Height:             157.48 cm           Admit Date: Weight:             107.05 kg           Admission Status:     Inpatient -                                                               Routine BSA / BMI:          2.05 m2 / 43.16     Encounter#:           [id number]                     kg/m2 Blood Pressure:     147/83 mmHg         Department Location:  [facility]                                                               Non Invasive Study Type:    TRANSTHORACIC ECHO (TTE) COMPLETE Diagnosis/ICD: Acute on chronic diastolic (congestive) heart failure                (CHF)-I50.33 Indication:    ADHF CPT Code:      Echo Complete w Full Doppler-[id number] Patient History: Diabetes:          Yes Pertinent History: Hyperlipidemia and PVD. ADHF, PAD. Study Detail: The following Echo studies were performed: 2D, M-Mode, Doppler and               color flow. Technically challenging study due to body habitus.               Definity used as a contrast agent for endocardial border               definition. Total contrast used for this procedure was 2 mL via IV               push.  PHYSICIAN INTERPRETATION: Left Ventricle: Left ventricular ejection fraction is normal, calculated by Argueta's biplane at 69%. There are no regional left ventricular wall motion abnormalities. The left ventricular cavity size is normal. There is mildly increased septal and mildly increased posterior left ventricular wall thickness. There is left ventricular concentric remodeling. Spectral  Doppler shows a Grade II (pseudonormal pattern) of left ventricular diastolic filling with an elevated left atrial pressure. Left Atrium: The left atrium is moderately dilated. Right Ventricle: The right ventricle is normal in size. There is normal right ventricular global systolic function. Right Atrium: The right atrium is mildly dilated. Aortic Valve: The aortic valve is trileaflet. There is minimal aortic valve cusp calcification. There is no evidence of aortic valve regurgitation. The peak instantaneous gradient of the aortic valve is 11 mmHg. Mitral Valve: The mitral valve is normal in structure. There is moderate mitral valve regurgitation which is posteriorly directed. The mitral regurgitant orifice area is 31 mm2. The mitral regurgitant volume is 55.50 ml. Mechanism of MR Is possibly restricted motion of the posterior leaflet. Tricuspid Valve: The tricuspid valve is structurally normal. There is moderate tricuspid regurgitation. The Doppler estimated RVSP is moderately elevated at 45.2 mmHg. Pulmonic Valve: The pulmonic valve is structurally normal. There is physiologic pulmonic valve regurgitation. Pericardium: Small pericardial effusion. The effusion is circumferential. Pleural: There is a small left pleural effusion. Aorta: The aortic root is normal. The Ao Sinus is 2.90 cm. The Asc Ao is 3.30 cm. Systemic Veins: The inferior vena cava appears normal in size, with IVC inspiratory collapse greater than 50%. In comparison to the previous echocardiogram(s): Compared with study dated 1/25/2024, the degree of MR appears moderate today rather than mild on prior study. TR is also worsened.  CONCLUSIONS:  1. Left ventricular ejection fraction is normal, calculated by Argueta's biplane at 69%.  2. Spectral Doppler shows a Grade II (pseudonormal pattern) of left ventricular diastolic filling with an elevated left atrial pressure.  3. There is normal right ventricular global systolic function.  4. The left atrium  is moderately dilated.  5. Moderate mitral valve regurgitation.  6. Mechanism of MR Is possibly restricted motion of the posterior leaflet.  7. Moderate tricuspid regurgitation visualized.  8. Moderately elevated right ventricular systolic pressure.  9. Small pericardial effusion. 10. Compared with study dated 1/25/2024, the degree of MR appears moderate today rather than mild on prior study. TR is also worsened. QUANTITATIVE DATA SUMMARY:  2D MEASUREMENTS:          Normal Ranges: LAs:             3.32 cm  (2.7-4.0cm) RVIDd:           2.76 cm  (0.9-3.6cm) IVSd:            1.10 cm  (0.6-1.1cm) LVPWd:           0.96 cm  (0.6-1.1cm) LVIDd:           4.41 cm  (3.9-5.9cm) LVIDs:           2.83 cm LV Mass Index:   76 g/m2 LVEDV Index:     44 ml/m2 LV % FS          35.8 %  LA VOLUME:                    Normal Ranges: LA Vol A4C:        88.4 ml    (22+/-6mL/m2) LA Vol A2C:        79.6 ml LA Vol BP:         85.3 ml LA Vol Index A4C:  43.1ml/m2 LA Vol Index A2C:  38.8 ml/m2 LA Vol Index BP:   41.6 ml/m2 LA Area A4C:       25.6 cm2 LA Area A2C:       23.9 cm2 LA Major Axis A4C: 6.3 cm LA Major Axis A2C: 6.1 cm LA Volume Index:   35.2 ml/m2  RA VOLUME BY A/L METHOD:            Normal Ranges: RA Vol A4C:              73.5 ml    (8.3-19.5ml) RA Vol Index A4C:        35.8 ml/m2 RA Area A4C:             21.0 cm2 RA Major Axis A4C:       5.1 cm  AORTA MEASUREMENTS:         Normal Ranges: Ao Sinus, d:        2.90 cm (2.1-3.5cm) Asc Ao, d:          3.30 cm (2.1-3.4cm)  LV SYSTOLIC FUNCTION BY 2D PLANIMETRY (MOD):                      Normal Ranges: EF-A4C View:    75 % (>=55%) EF-A2C View:    66 % EF-Biplane:     69 % LV EF Reported: 69 %  LV DIASTOLIC FUNCTION:             Normal Ranges: MV Peak E:             1.19 m/s    (0.7-1.2 m/s) MV Peak A:             1.00 m/s    (0.42-0.7 m/s) E/A Ratio:             1.19        (1.0-2.2) MV e'                  0.040 m/s   (>8.0) MV lateral e'          0.05 m/s MV medial e'           0.03 m/s  MV A Dur:              183.39 msec E/e' Ratio:            29.68       (<8.0) PulmV Sys Molina:         49.18 cm/s PulmV Robles Molina:        57.26 cm/s PulmV S/D Molina:         0.86 PulmV A Revs Molina:      21.81 cm/s PulmV A Revs Dur:      114.19 msec  MITRAL VALVE:          Normal Ranges: MV DT:        232 msec (150-240msec)  MITRAL INSUFFICIENCY:             Normal Ranges: MR VTI:               180.58 cm MR Vmax:              523.70 cm/s MR Volume:            55.50 ml MR Flow Rt:           160.96 ml/s MR EROA:              31 mm2  AORTIC VALVE:            Normal Ranges: AoV Vmax:      1.68 m/s  (<=1.7m/s) AoV Peak P.3 mmHg (<20mmHg) LVOT Max Molina:  1.33 m/s  (<=1.1m/s) LVOT VTI:      26.11 cm LVOT Diameter: 1.79 cm   (1.8-2.4cm) AoV Area,Vmax: 2.00 cm2  (2.5-4.5cm2)  RIGHT VENTRICLE: RV Basal 3.50 cm RV Mid   2.80 cm RV Major 7.1 cm TAPSE:   25.0 mm RV s'    0.10 m/s  TRICUSPID VALVE/RVSP:          Normal Ranges: Peak TR Velocity:     3.25 m/s Est. RA Pressure:     3 mmHg RV Syst Pressure:     45 mmHg  (< 30mmHg) IVC Diam:             1.90 cm  PULMONIC VALVE:          Normal Ranges: PV Accel Time:  131 msec (>120ms) PV Max Molina:     1.0 m/s  (0.6-0.9m/s) PV Max PG:      3.8 mmHg  Pulmonary Veins: PulmV A Revs Dur: 114.19 msec PulmV A Revs Molina: 21.81 cm/s PulmV Robles Molina:   57.26 cm/s PulmV S/D Molina:    0.86 PulmV Sys Molina:    49.18 cm/s  AORTA: Asc Ao Diam 3.29 cm  [id number] Ninfa Pastor MD Electronically signed on 1/3/2025 at 12:11:49 PM  ** Final **     ECG 12 lead    Result Date: 1/3/2025  Normal sinus rhythm Left posterior fascicular block Cannot rule out Anterior infarct (cited on or before 15-ISAK-2024) Abnormal ECG When compared with ECG of 06-SEP-2024 03:33, Left posterior fascicular block is now Present Inverted T waves have replaced nonspecific T wave abnormality in Inferior leads See ED provider note for full interpretation and clinical correlation Confirmed by Kaye Jay ([id number]) on 1/3/2025 12:44:14 AM    XR  chest 1 view    Result Date: 1/2/2025  STUDY: Chest Radiograph;  1/2/2025 12:59PM INDICATION: Shortness of breath. COMPARISON: 9/6/2024 XR Chest. ACCESSION NUMBER(S): [id number] ORDERING CLINICIAN: RENETTA PALMA TECHNIQUE:  Frontal chest was obtained at 1258 hours. FINDINGS: Cardiac silhouette remains enlarged. There is pulmonary vascular congestion bilaterally. Small bibasal pleural effusions with associated atelectasis. No visible pneumothorax.     Constellation of findings is suggestive of congestive heart failure. Signed by Rupesh Borges MD    Point of Care Ultrasound    Result Date: 1/2/2025  Valdo Whatley DO     1/4/2025 11:13 AM Performed by: Valdo Whatley DO Authorized by: Valdo Whatley DO  Respiratory Indications: shortness of breath, tachypnea and hypoxia Procedure: Cardiac Ultrasound Findings:  Views: parasternal long, parasternal short and apical four Effusion: The pericardial space was visualized and was positive for a PERICARDIAL EFFUSION. Activity: Ventricular contractions were visualized. LV: LV systolic function was NORMAL. RV: RV size was NORMAL. Impression: Cardiac: The focused cardiac ultrasound exam had ABNORMAL findings as specified.     BI US breast limited left    Result Date: 12/26/2024  Interpreted By:  Jax Hagan, STUDY: BI US BREAST LIMITED LEFT;  12/26/2024 11:30 am   ACCESSION NUMBER(S): [id number]   ORDERING CLINICIAN: ROGER URBINA   INDICATION: Imaging follow-up to document resolution of a probable hematoma.   ,R92.8 Other abnormal and inconclusive findings on diagnostic imaging of breast   COMPARISON: 11/25/2020   FINDINGS: Targeted ultrasound was performed of the left breast by a registered sonographer with elastography.   The patient was initially recalled from a screening mammogram for the left breast finding.   Sonographic images were obtained of the left breast 23 cm from the nipple. The previously described finding has since resolved which is consistent with the  suspected diagnosis of a hematoma. No new suspicious findings are seen. Elastography was also performed which demonstrates no areas of increased stiffness.       Interval resolution of the previously described finding. Patient can return to screening.   BI-RADS CATEGORY: BI-RADS Category:  1 Negative. Recommendation:  Annual Screening. Recommended Date:  1 Year. Laterality:  Bilateral.   For any future breast imaging appointments, please call [phone]).     MACRO: None   Signed by: Jax Hagan 12/26/2024 11:36 AM Dictation workstation:   [id number]       LDA:       NUTRITION: Adult diet Regular  EMERGENCY CONTACT: Extended Emergency Contact Information  Primary Emergency Contact: Brandon Rousseau  Mobile Phone: [phone]  Relation: Significant Other  Secondary Emergency Contact: KatharineKimberly figueroa  Mobile Phone: [phone]  Relation: Daughter   needed? No  CODE STATUS: Full Code  DISPO: Discharge Planning  Living Arrangements: Spouse/significant other  Support Systems: Spouse/significant other  Assistance Needed: None  Type of Residence: Private residence  Number of Stairs to Enter Residence: 5  Number of Stairs Within Residence: 0  Do you have animals or pets at home?: Yes  Type of Animals or Pets: dog  Who is requesting discharge planning?: Provider  Home or Post Acute Services: None  Type of Home Care Services: Home nursing visits  Expected Discharge Disposition: Home  Does the patient need discharge transport arranged?: No  AMPAC: Daily Activity - Total Score: 23    FOLLOWUP:   Future Appointments   Date Time Provider Department Center   1/14/2025  2:20 PM Amanda Joe, PharmD [id number] [facility]   1/17/2025 10:00 AM Marylin Sparks MD MPH [id number] [facility]   1/20/2025 11:30 AM Marcin Motta MD [id number] [facility]   2/5/2025  9:45 AM Barrera Rodriguez MD [id number] [facility]   2/20/2025 10:30 AM Mone Aquino MD [id number] [facility]

## 2025-01-10 NOTE — PROGRESS NOTES
Subjective Data:   Patient remains on supplemental O2, still JARA and overtly swollen in BLE. Boyfriend at bedside, discussed disease process of HFpEF.    - give IVP Lasix 80mg x1 bolus, followed by gtt at 15mg/hr  - ?consider renal consult if poor response to diuresis     Overnight Events:  No acute events overnight.     Objective Data:  Last Recorded Vitals:  Vitals:    01/10/25 0222 01/10/25 0253 01/10/25 0557 01/10/25 0942   BP:   112/71 139/79   BP Location:   Left arm Left arm   Patient Position:   Lying Sitting   Pulse:   86 79   Resp:   18 17   Temp:   36.5 °C (97.7 °F) 36.9 °C (98.4 °F)   TempSrc:   Temporal Temporal   SpO2: 92%  95% 94%   Weight:  108 kg (237 lb 4.8 oz)     Height:         Last Labs:  CBC - 1/9/2025:  7:13 PM  5.5 9.6 324    30.7      CMP - 1/9/2025:  7:13 PM  9.2 6.9 14 --- 0.4   6.1 3.7 12 114      PTT - 4/15/2024:  9:28 PM  0.9   10.3 31     TROPHS   Date/Time Value Ref Range Status   01/02/2025 12:53 PM 21 0 - 34 ng/L Final   02/03/2024 01:22 AM 12 0 - 34 ng/L Final   01/24/2024 08:32 PM 25 0 - 34 ng/L Final   01/25/2023 04:58 PM 7 0 - 34 ng/L Final     Comment:     .  Less than 99th percentile of normal range cutoff-  Female and children under 18 years old <35 ng/L; Male <54 ng/L: Negative  Repeat testing should be performed if clinically indicated.   .  Female and children under 18 years old  ng/L; Male  ng/L:  Consistent with possible cardiac damage and possible increased clinical   risk. Serial measurements may help to assess extent of myocardial damage.   .  >120 ng/L: Consistent with cardiac damage, increased clinical risk and  myocardial infarction. Serial measurements may help assess extent of   myocardial damage.   .   NOTE: Children less than 1 year old may have higher baseline troponin   levels and results should be interpreted in conjunction with the overall   clinical context.  .  NOTE: Troponin I testing is performed using a different   testing methodology at  [facility] than at other   [facility]. Direct result comparisons should only   be made within the same method.     01/25/2023 04:16 PM 8 0 - 34 ng/L Final     Comment:     .  Less than 99th percentile of normal range cutoff-  Female and children under 18 years old <35 ng/L; Male <54 ng/L: Negative  Repeat testing should be performed if clinically indicated.   .  Female and children under 18 years old  ng/L; Male  ng/L:  Consistent with possible cardiac damage and possible increased clinical   risk. Serial measurements may help to assess extent of myocardial damage.   .  >120 ng/L: Consistent with cardiac damage, increased clinical risk and  myocardial infarction. Serial measurements may help assess extent of   myocardial damage.   .   NOTE: Children less than 1 year old may have higher baseline troponin   levels and results should be interpreted in conjunction with the overall   clinical context.  .  NOTE: Troponin I testing is performed using a different   testing methodology at [facility] than at other   [facility]. Direct result comparisons should only   be made within the same method.       BNP   Date/Time Value Ref Range Status   01/04/2025 09:19  0 - 99 pg/mL Final   01/02/2025 12:53  0 - 99 pg/mL Final     HGBA1C   Date/Time Value Ref Range Status   01/02/2025 06:35 PM 8.7 See comment % Final   11/19/2024 02:06 PM 9 4.2 - 6.5 % Final   07/19/2024 08:52 AM 11.2 4.2 - 6.5 % Final     VLDL   Date/Time Value Ref Range Status   05/08/2023 09:35 AM 38 0 - 40 mg/dL Final   04/12/2021 09:06 AM 21 0 - 40 mg/dL Final   10/20/2020 12:36 PM 24 0 - 40 mg/dL Final      Last I/O:  I/O last 3 completed shifts:  In: 1260 (11.7 mL/kg) [P.O.:1260]  Out: 3000 (27.9 mL/kg) [Urine:3000 (0.8 mL/kg/hr)]  Weight: 107.6 kg     Past Cardiology Tests (Last 3 Years):    Echo:  Transthoracic Echo (TTE) Complete 01/25/2024     PHYSICIAN INTERPRETATION:  Left Ventricle: The  left ventricular systolic function is hyperdynamic, with an estimated ejection fraction of 70-75%. There are no regional wall motion abnormalities. The left ventricular cavity size is normal. The left ventricular septal wall thickness is mildly increased. There is mild concentric left ventricular hypertrophy. Spectral Doppler shows a normal pattern of left ventricular diastolic filling.  Left Atrium: The left atrium is mildly dilated.  Right Ventricle: The right ventricle was not well visualized. There is normal right ventricular global systolic function.  Right Atrium: The right atrium is normal in size.  Aortic Valve: The aortic valve is trileaflet. There is no evidence of aortic valve regurgitation. The peak instantaneous gradient of the aortic valve is 10.0 mmHg. The mean gradient of the aortic valve is 5.0 mmHg.  Mitral Valve: The mitral valve is normal in structure. There is trace to mild mitral valve regurgitation.  Tricuspid Valve: The tricuspid valve is structurally normal. There is trace to mild tricuspid regurgitation. The Doppler estimated RVSP is within normal limits at 32.4 mmHg.  Pulmonic Valve: The pulmonic valve is structurally normal. There is physiologic pulmonic valve regurgitation.  Pericardium: There is a trivial pericardial effusion.  Pleural: There is left pleural effusion.  Aorta: The aortic root is normal.    CONCLUSIONS:  1. Left ventricular systolic function is hyperdynamic with a 70-75% estimated ejection fraction.  2. RVSP within normal limits.    ** Final **    Transthoracic Echo (TTE) Complete 01/3/2025  CONCLUSIONS:   1. Left ventricular ejection fraction is normal, calculated by Argueta's biplane at 69%.   2. Spectral Doppler shows a Grade II (pseudonormal pattern) of left ventricular diastolic filling with an elevated left atrial pressure.   3. There is normal right ventricular global systolic function.   4. The left atrium is moderately dilated.   5. Moderate mitral valve  regurgitation.   6. Mechanism of MR Is possibly restricted motion of the posterior leaflet.   7. Moderate tricuspid regurgitation visualized.   8. Moderately elevated right ventricular systolic pressure.   9. Small pericardial effusion.  10. Compared with study dated 1/25/2024, the degree of MR appears moderate today rather than mild on prior study. TR is also worsened.    Inpatient Medications:  Scheduled medications   Medication Dose Route Frequency    amLODIPine  10 mg oral Daily    aspirin  81 mg oral Daily    atorvastatin  40 mg oral Nightly    [Held by provider] empagliflozin  10 mg oral Daily    folic acid  1 mg oral Daily    gabapentin  300 mg oral BID    heparin (porcine)  7,500 Units subcutaneous q8h NEHEMIAS    hydrALAZINE  50 mg oral TID    insulin glargine  16 Units subcutaneous Daily    insulin lispro  0-10 Units subcutaneous TID AC    isosorbide dinitrate  10 mg oral TID    polyethylene glycol  17 g oral Daily    vitamin B complex-vitamin C-folic acid  1 capsule oral Daily     PRN medications   Medication    acetaminophen    albuterol    dextrose    dextrose    glucagon    glucagon    hydrALAZINE    oxygen    oxygen     Continuous Medications   Medication Dose Last Rate    furosemide  15 mg/hr       Physical Exam:  General: NAD, lying in bed  Skin: warm and dry throughout  Head/ neck: +JVD above mid-neck w/HOB at 45 degrees  Cardiac: RRR, S1S2S3, +murmur  Pulm: +rales in BLL, on 2L O2 N/C (not baseline)  GI: soft, nontender, non-distended, +BS  Extremities: +1 edema in BLE up to shins  Neuro: no focal neuro deficits, A&Ox4  Psych: appropriate mood and behavior, pleasant       Assessment/Plan   Nuris Squires is a 59 y.o. female presenting with PMH of poorly controlled insulin dependent type 2 diabetes c/b neuropathy, hyperlipidemia, PAD, CKD stage IV, hypertension, multiple foot infections s/p partial R great toe amputation, fall w/L femur fracture s/p ORIF (4/2024) that presents to the emergency  department today for shortness of breath x1 day. Admitted under HHVI Service for further management of acute ADHF and HTN Urgency.     Acute diastolic heart failure  Chest tightness, resolved  - likely 2/2 uncontrolled HTN  - TTE (1/2024): EF 70-75%  - TTE 1/3/2025: EF 69%, grade II diastolic filling, moderate MR, moderate TR, small pericardial effusion  - neg stress 10/2019  - CXR: pulm vascular congestion BL, small bibasilar pleural effusions  - Last BNP (1/5) 161 (376 on admit iso obesity)  - HS trop 21, no ischemic changes on ECG, no c/f ACS  - dry weight ~100kg  - admit weight: 107kg  - daily weight: 108kg (107, 106 kg)  - RHC (1/8): reviewed tracings with Dr. Mckee, numbers are questionable but RVEDP 20, PA 75/25, unclear wedge  - of note, pharmacy verified patient on PO lasix 20mg daily at home (?for unclear reasons, presumably prescribed by PCP for leg swelling)-> per patient, was just started on it at home  - s/p diuresis w/IVP Lasix boluses w/inadequate response--->give IVP Lasix 80mg x1 bolus, followed by lasix gtt at 15mg/hr  - cont. HOLDING empa 10mg daily iso rising Cr (held since 1/7, started in-house)  - avoid ACEI/ARB/ARNI, MRA for now d/t rising Cr  - cont. Hydral 50mg TID, isordil 10mg TID  - HF navigator following, establishing care here  - Daily standing weights, strict I&O's, 2g sodium diet, 2L fluid restriction      Acute hypoxic respiratory failure iso of ADHF exacerbation, improving  Pulmonary HTN  - p.ox initially at 61% but improved rapidly with placement of nasal cannula  - She was requiring 6 L of oxygen, BiPAP briefly due to the concern for SCAPE, received SL nitro and she was able to wean off of BiPAP  - currently on 2L O2 N/C, wean O2 as tolerated  - VQ scan (1/9; c/f PE given high PA on RHC): low probability of PE  - Flu A/B/RSV/COVID negative  - diuresis plan as above  - pulmonary toileting w/incentive spirometry     HTN urgency, improving  - BP on on admit: 220/98  - SBP  improved to 143 after resumption of home meds and s/p IVP Hydral 5mg x1  - last 24 hour SBP range: 110s-140s  - BPs labile, clonidine weaned off on 1/5, intermittent headaches w/isordil (tolerating 10mg TID)  - cont. BP meds as above, home amlodipine 10mg daily     CKD IV, worsening  - Cr last year 1.7-5.3, worsening since 2022  - admit Cr 2.34  - daily Cr 3.61 (3.73, 3.82,3.75,2.93,2.74, 2.55, 2.49, 2.31)  - urine lytes, osmolality reviewed   - likely cardiorenal 2/2 ADHF  - diuresis plan as above  - ?consider renal consult if no response to aggressive diuresis     Insulin dependent Type 2 diabetes  Poorly controlled  - Follow with clinical pharmacy closely/PCP  - Hgb A1c 8.7% (improved from previous 11.9%)  - home regimen: Mounjaro 2.5mg weekly, basaglar 32 units every morning, admelog SS with meals  - c/w lantus 16 units every morning (decreased from 32U iso AM BG of 62) and SSI #2 while inpatient  - plans w/empa (as above)  - OARRS reviewed, cont. Home gabapentin 300mg BID      HLD  PAD  - c/w asa 81mg daily, atorva 40mg daily        DVT ppx: subQ heparin  Emergency contacts: Boyfrienterrie Rousseau #[phone]  Brother Luis Squires #[phone]  DISPO: previously received C w/devoted; likely resume at discharge   - HF navigator following, established care here  - discharge home pending further diuresis & improvement in Cr    All labs, vital signs, tests & imaging results, and medications were reviewed.     Patient seen and discussed with Dr. Mckee    Code Status:  Full Code    Betty Martin, APRN-CNP

## 2025-01-10 NOTE — NURSING NOTE
Problem: Pt. Has a new diagnosis of HFpEF. Referred for heart failure education. Assessment: Pt. Had noticed increasing SOB, worsening fatigue and LE swelling prior to admission. She called the Hospital hot line, provided a pulse os reading, described her symptoms and she was advised to call 911 or go to the ED. I recognized that she was aware of her body signals for worsening symptoms and to continue to pay attention to her symptoms. We discussed symptoms for calling the MD/PHYLLIS and when to call 911. Teaching about low sodium foods and food preparation was done. How to read a label was reviewed. She gets Meals on Wheels and was curious if the amount of sodium is within the recommendations. She can read the label on the meal. She intends to stop drinking Pepsi and switch to diet gingerale. Currently she is not on a fluid restriction. We discussed BP control. She has a BP machine, pulse ox and scale at home and uses these devices. Plan: Continue to re-enforce self-management of heart failure, name medications, action and possible side effects with each medication pass. Living with Heart Failure and heart failure magnet was provided. I will follow up. DUTCH Mckeon, RN, PhD, APRN-CCNS, CCRN

## 2025-01-10 NOTE — CONSULTS
Inpatient Diabetes Education Consult    Reason for Visit:  Nuris Squires is a 59 y.o. female who presents for HF, IDDMT2, CKD4.  Asked to see re carb control meal plan    Consulting Service/Provider: Cardiology team    Visit Type: Initial visit    Visit Modality: In-person    Discharge Equipment/Supply Needs:       Patient has supplies at home: Pen needles and CGM supplies: wears Remington 3 at home    Patient History and Assessment:  New diagnosis:  n/a  Previous diagnosis: Type 2  Patient known to Diabetes Education department: No  Treatment prior to hospital admission: Insulin: Rapid acting, Long acting, via pen  Blood glucose testing: Continuous glucose monitor  Recently started Mounjaro once weekly  Complications: autonomic neuropathy, cardiovascular disease, chronic kidney disease, and obesity  PTA Medications:    Current Outpatient Medications   Medication Instructions    acetaminophen (TYLENOL) 650 mg, oral, Every 4 hours PRN    albuterol 90 mcg/actuation inhaler 2 puffs, inhalation, Every 6 hours PRN    amLODIPine (NORVASC) 10 mg, oral, Daily    aspirin 81 mg, oral, Daily RT    atorvastatin (LIPITOR) 40 mg, oral, Daily    b complex vitamins capsule 1 capsule, Daily    Basaglar KwikPen U-100 Insulin 32 Units, subcutaneous, Every morning, Take as directed per insulin instructions.    blood-glucose sensor (FreeStyle Remington 3 Sensor) device Use to monitor blood glucose. Change sensor every 14 days.    cloNIDine (CATAPRES) 0.1 mg, oral, 2 times daily    empagliflozin (JARDIANCE) 10 mg, oral, Daily    folic acid (Folvite) 1 mg tablet 1 tablet, Daily    FreeStyle Remington 3 Perry misc Use as instructed to monitor blood glucose.    furosemide (LASIX) 20 mg, oral, Daily    furosemide (LASIX) 40 mg, oral, 2 times daily before meals    gabapentin (NEURONTIN) 300 mg, oral, 2 times daily    hydrALAZINE (APRESOLINE) 50 mg, oral, 3 times daily    insulin lispro (Admelog SoloStar U-100 Insulin) 100 unit/mL injection Inject per  "sliding scale instructions under the skin 3 times a day with meals. (Max daily dose 70 units)    isosorbide dinitrate (ISORDIL) 10 mg, oral, 3 times daily (0900,1400,1900)    meclizine (ANTIVERT) 25 mg, oral, 3 times daily PRN    Mounjaro 2.5 mg, subcutaneous, Every 7 days    OneTouch Delica Plus Lancet 30 gauge misc USE TO TEST BLOOD SUGAR AS DIRECTED    OneTouch Ultra Test strip Use 1 strip BID to check glucose    OneTouch Ultra2 Meter misc use 1 to 2 times daily    pen needle, diabetic 32 gauge x 5/32\" needle Use four times a day with insulin use       Glucose   Date/Time Value Ref Range Status   01/09/2025 07:13  (H) 74 - 99 mg/dL Final   01/08/2025 07:36  (H) 74 - 99 mg/dL Final   01/07/2025 07:30  (H) 74 - 99 mg/dL Final   01/06/2025 08:39  (H) 74 - 99 mg/dL Final   01/05/2025 08:08  (H) 74 - 99 mg/dL Final   01/04/2025 09:19  (H) 74 - 99 mg/dL Final   01/04/2025 07:00 AM 79 74 - 99 mg/dL Final   01/03/2025 08:09  (H) 74 - 99 mg/dL Final   01/02/2025 06:35  (H) 74 - 99 mg/dL Final   01/02/2025 12:53  (H) 74 - 99 mg/dL Final     No results found for: \"CPEPTIDE\"  POC HEMOGLOBIN A1c   Date Value Ref Range Status   11/19/2024 9 (A) 4.2 - 6.5 % Final   07/19/2024 11.2 (A) 4.2 - 6.5 % Final     Hemoglobin A1C   Date Value Ref Range Status   01/02/2025 8.7 (H) See comment % Final   04/16/2024 11.9 (H) see below % Final   01/24/2024 13.1 (H) see below % Final       Patient Learning/Readiness Assessment:  Ms. Squires is agreeable to visit today re carb controlled eating plan    Interventions/Topics Covered:  See After Visit Summary for handouts/information sheets provided to patient.  Education Documentation  Healthy Food Options, taught by Mag Leslie RN at 1/10/2025  3:10 PM.  Learner: Patient  Readiness: Acceptance  Method: Explanation, Handout, Teach-back  Response: Needs Reinforcement    How Foods Affect Blood Sugar, taught by Mag Leslie RN at 1/10/2025  3:10 " PM.  Learner: Patient  Readiness: Acceptance  Method: Explanation, Handout, Teach-back  Response: Needs Reinforcement    Self-Care Behaviors, taught by Mag Leslie RN at 1/10/2025  3:10 PM.  Learner: Patient  Readiness: Acceptance  Method: Explanation  Response: Verbalizes Understanding          Additional topics covered: Ms. Squires follows with PCP for diabetes management.  States she has an appt coming up soon with nutritionist.  Handout provided with plate method and food lists.  We reviewed what are carbs and proteins.  We did meal scenarios with her usual foods to calculate carb intake.  She doesn't like many veggies unless they are in soup -- discussed ways to make a veggie soup without high sodium content or too many carbs.  We reviewed reading food label and how to measure out portions.    Additional materials provided: Boris food list handout    CGM:  wears hakeem at home    Education Outcome/Recommendations:        Recommendations for bedside nursing: Allow patient to self-inject insulin (supervised)    Recommendations for Providers: Follow-up w/ PCP and/or Endocrinology    Additional Comments: Ms. Squires will follow with her PCP and attend nutrition appt.  Since starting Mounjaro she is noticing that she is having earlier satiety.  Discussed the positive effects of GLP-1.  Will follow as needed while inpatient.  Time spent:  35 mins.

## 2025-01-11 LAB
ALBUMIN SERPL BCP-MCNC: 3.5 G/DL (ref 3.4–5)
ANION GAP SERPL CALC-SCNC: 17 MMOL/L (ref 10–20)
BUN SERPL-MCNC: 73 MG/DL (ref 6–23)
CALCIUM SERPL-MCNC: 9.4 MG/DL (ref 8.6–10.6)
CHLORIDE SERPL-SCNC: 99 MMOL/L (ref 98–107)
CO2 SERPL-SCNC: 27 MMOL/L (ref 21–32)
CREAT SERPL-MCNC: 3.89 MG/DL (ref 0.5–1.05)
EGFRCR SERPLBLD CKD-EPI 2021: 13 ML/MIN/1.73M*2
ERYTHROCYTE [DISTWIDTH] IN BLOOD BY AUTOMATED COUNT: 14.1 % (ref 11.5–14.5)
GLUCOSE BLD MANUAL STRIP-MCNC: 147 MG/DL (ref 74–99)
GLUCOSE BLD MANUAL STRIP-MCNC: 158 MG/DL (ref 74–99)
GLUCOSE BLD MANUAL STRIP-MCNC: 176 MG/DL (ref 74–99)
GLUCOSE BLD MANUAL STRIP-MCNC: 195 MG/DL (ref 74–99)
GLUCOSE SERPL-MCNC: 162 MG/DL (ref 74–99)
HCT VFR BLD AUTO: 30 % (ref 36–46)
HGB BLD-MCNC: 9.9 G/DL (ref 12–16)
MAGNESIUM SERPL-MCNC: 2.47 MG/DL (ref 1.6–2.4)
MCH RBC QN AUTO: 29.8 PG (ref 26–34)
MCHC RBC AUTO-ENTMCNC: 33 G/DL (ref 32–36)
MCV RBC AUTO: 90 FL (ref 80–100)
NRBC BLD-RTO: 0 /100 WBCS (ref 0–0)
PHOSPHATE SERPL-MCNC: 6.5 MG/DL (ref 2.5–4.9)
PLATELET # BLD AUTO: 383 X10*3/UL (ref 150–450)
POTASSIUM SERPL-SCNC: 4.6 MMOL/L (ref 3.5–5.3)
RBC # BLD AUTO: 3.32 X10*6/UL (ref 4–5.2)
SODIUM SERPL-SCNC: 138 MMOL/L (ref 136–145)
WBC # BLD AUTO: 6.5 X10*3/UL (ref 4.4–11.3)

## 2025-01-11 PROCEDURE — 99232 SBSQ HOSP IP/OBS MODERATE 35: CPT | Performed by: STUDENT IN AN ORGANIZED HEALTH CARE EDUCATION/TRAINING PROGRAM

## 2025-01-11 PROCEDURE — 83540 ASSAY OF IRON: CPT | Performed by: NURSE PRACTITIONER

## 2025-01-11 PROCEDURE — 2500000001 HC RX 250 WO HCPCS SELF ADMINISTERED DRUGS (ALT 637 FOR MEDICARE OP): Performed by: NURSE PRACTITIONER

## 2025-01-11 PROCEDURE — 2500000002 HC RX 250 W HCPCS SELF ADMINISTERED DRUGS (ALT 637 FOR MEDICARE OP, ALT 636 FOR OP/ED)

## 2025-01-11 PROCEDURE — 82947 ASSAY GLUCOSE BLOOD QUANT: CPT

## 2025-01-11 PROCEDURE — 2500000001 HC RX 250 WO HCPCS SELF ADMINISTERED DRUGS (ALT 637 FOR MEDICARE OP)

## 2025-01-11 PROCEDURE — 2500000001 HC RX 250 WO HCPCS SELF ADMINISTERED DRUGS (ALT 637 FOR MEDICARE OP): Performed by: STUDENT IN AN ORGANIZED HEALTH CARE EDUCATION/TRAINING PROGRAM

## 2025-01-11 PROCEDURE — 2500000002 HC RX 250 W HCPCS SELF ADMINISTERED DRUGS (ALT 637 FOR MEDICARE OP, ALT 636 FOR OP/ED): Performed by: NURSE PRACTITIONER

## 2025-01-11 PROCEDURE — 2500000004 HC RX 250 GENERAL PHARMACY W/ HCPCS (ALT 636 FOR OP/ED): Performed by: NURSE PRACTITIONER

## 2025-01-11 PROCEDURE — 82728 ASSAY OF FERRITIN: CPT | Performed by: NURSE PRACTITIONER

## 2025-01-11 PROCEDURE — 83735 ASSAY OF MAGNESIUM: CPT | Performed by: NURSE PRACTITIONER

## 2025-01-11 PROCEDURE — 80069 RENAL FUNCTION PANEL: CPT | Performed by: NURSE PRACTITIONER

## 2025-01-11 PROCEDURE — 36415 COLL VENOUS BLD VENIPUNCTURE: CPT | Performed by: NURSE PRACTITIONER

## 2025-01-11 PROCEDURE — 85027 COMPLETE CBC AUTOMATED: CPT | Performed by: NURSE PRACTITIONER

## 2025-01-11 PROCEDURE — 1200000002 HC GENERAL ROOM WITH TELEMETRY DAILY

## 2025-01-11 RX ORDER — METOLAZONE 5 MG/1
5 TABLET ORAL ONCE
Status: COMPLETED | OUTPATIENT
Start: 2025-01-11 | End: 2025-01-11

## 2025-01-11 RX ADMIN — INSULIN LISPRO 2 UNITS: 100 INJECTION, SOLUTION INTRAVENOUS; SUBCUTANEOUS at 11:39

## 2025-01-11 RX ADMIN — HYDRALAZINE HYDROCHLORIDE 50 MG: 50 TABLET ORAL at 20:15

## 2025-01-11 RX ADMIN — METOLAZONE 5 MG: 5 TABLET ORAL at 12:46

## 2025-01-11 RX ADMIN — FOLIC ACID 1 MG: 1 TABLET ORAL at 09:02

## 2025-01-11 RX ADMIN — HEPARIN SODIUM 7500 UNITS: 5000 INJECTION INTRAVENOUS; SUBCUTANEOUS at 21:43

## 2025-01-11 RX ADMIN — ASPIRIN 81 MG: 81 TABLET, CHEWABLE ORAL at 09:02

## 2025-01-11 RX ADMIN — AMLODIPINE BESYLATE 10 MG: 10 TABLET ORAL at 09:02

## 2025-01-11 RX ADMIN — HYDRALAZINE HYDROCHLORIDE 50 MG: 50 TABLET ORAL at 09:02

## 2025-01-11 RX ADMIN — HEPARIN SODIUM 7500 UNITS: 5000 INJECTION INTRAVENOUS; SUBCUTANEOUS at 05:16

## 2025-01-11 RX ADMIN — INSULIN LISPRO 2 UNITS: 100 INJECTION, SOLUTION INTRAVENOUS; SUBCUTANEOUS at 18:06

## 2025-01-11 RX ADMIN — GABAPENTIN 300 MG: 300 CAPSULE ORAL at 20:15

## 2025-01-11 RX ADMIN — POLYETHYLENE GLYCOL 3350 17 G: 17 POWDER, FOR SOLUTION ORAL at 09:03

## 2025-01-11 RX ADMIN — ISOSORBIDE DINITRATE 10 MG: 10 TABLET ORAL at 14:33

## 2025-01-11 RX ADMIN — HYDRALAZINE HYDROCHLORIDE 50 MG: 50 TABLET ORAL at 14:33

## 2025-01-11 RX ADMIN — ACETAMINOPHEN 650 MG: 325 TABLET ORAL at 05:16

## 2025-01-11 RX ADMIN — INSULIN GLARGINE 16 UNITS: 100 INJECTION, SOLUTION SUBCUTANEOUS at 09:03

## 2025-01-11 RX ADMIN — ISOSORBIDE DINITRATE 10 MG: 10 TABLET ORAL at 09:02

## 2025-01-11 RX ADMIN — GABAPENTIN 300 MG: 300 CAPSULE ORAL at 09:02

## 2025-01-11 RX ADMIN — NEPHROCAP 1 CAPSULE: 1 CAP ORAL at 09:02

## 2025-01-11 RX ADMIN — ISOSORBIDE DINITRATE 10 MG: 10 TABLET ORAL at 20:15

## 2025-01-11 RX ADMIN — HEPARIN SODIUM 7500 UNITS: 5000 INJECTION INTRAVENOUS; SUBCUTANEOUS at 14:37

## 2025-01-11 RX ADMIN — FUROSEMIDE 15 MG/HR: 10 INJECTION, SOLUTION INTRAMUSCULAR; INTRAVENOUS at 05:40

## 2025-01-11 RX ADMIN — ATORVASTATIN CALCIUM 40 MG: 40 TABLET, FILM COATED ORAL at 20:15

## 2025-01-11 ASSESSMENT — COGNITIVE AND FUNCTIONAL STATUS - GENERAL
MOBILITY SCORE: 23
MOBILITY SCORE: 23
CLIMB 3 TO 5 STEPS WITH RAILING: A LITTLE
DAILY ACTIVITIY SCORE: 24
MOBILITY SCORE: 23
DAILY ACTIVITIY SCORE: 24
DAILY ACTIVITIY SCORE: 24
CLIMB 3 TO 5 STEPS WITH RAILING: A LITTLE
CLIMB 3 TO 5 STEPS WITH RAILING: A LITTLE

## 2025-01-11 ASSESSMENT — PAIN - FUNCTIONAL ASSESSMENT
PAIN_FUNCTIONAL_ASSESSMENT: 0-10

## 2025-01-11 ASSESSMENT — PAIN SCALES - GENERAL
PAINLEVEL_OUTOF10: 0 - NO PAIN
PAINLEVEL_OUTOF10: 3
PAINLEVEL_OUTOF10: 0 - NO PAIN

## 2025-01-11 ASSESSMENT — PAIN DESCRIPTION - DESCRIPTORS: DESCRIPTORS: ACHING

## 2025-01-11 NOTE — CARE PLAN
The patient's goals for the shift include rest    The clinical goals for the shift include Patient will remain safe and free from falls throughout shift.    Problem: Pain - Adult  Goal: Verbalizes/displays adequate comfort level or baseline comfort level  Outcome: Progressing     Problem: Safety - Adult  Goal: Free from fall injury  Outcome: Progressing     Problem: Discharge Planning  Goal: Discharge to home or other facility with appropriate resources  Outcome: Progressing     Problem: Chronic Conditions and Co-morbidities  Goal: Patient's chronic conditions and co-morbidity symptoms are monitored and maintained or improved  Outcome: Progressing     Problem: Skin  Goal: Decreased wound size/increased tissue granulation at next dressing change  Outcome: Progressing  Goal: Participates in plan/prevention/treatment measures  Outcome: Progressing  Goal: Prevent/manage excess moisture  Outcome: Progressing  Goal: Prevent/minimize sheer/friction injuries  Outcome: Progressing  Goal: Promote/optimize nutrition  Outcome: Progressing  Goal: Promote skin healing  Outcome: Progressing     Problem: Fall/Injury  Goal: Not fall by end of shift  Outcome: Progressing  Goal: Be free from injury by end of the shift  Outcome: Progressing  Goal: Verbalize understanding of personal risk factors for fall in the hospital  Outcome: Progressing  Goal: Verbalize understanding of risk factor reduction measures to prevent injury from fall in the home  Outcome: Progressing  Goal: Use assistive devices by end of the shift  Outcome: Progressing  Goal: Pace activities to prevent fatigue by end of the shift  Outcome: Progressing     Problem: Diabetes  Goal: Achieve decreasing blood glucose levels by end of shift  Outcome: Progressing  Goal: Increase stability of blood glucose readings by end of shift  Outcome: Progressing  Goal: Decrease in ketones present in urine by end of shift  Outcome: Progressing  Goal: Maintain electrolyte levels within  acceptable range throughout shift  Outcome: Progressing  Goal: Maintain glucose levels >70mg/dl to <250mg/dl throughout shift  Outcome: Progressing  Goal: No changes in neurological exam by end of shift  Outcome: Progressing  Goal: Learn about and adhere to nutrition recommendations by end of shift  Outcome: Progressing  Goal: Vital signs within normal range for age by end of shift  Outcome: Progressing  Goal: Increase self care and/or family involovement by end of shift  Outcome: Progressing  Goal: Receive DSME education by end of shift  Outcome: Progressing     Problem: Heart Failure  Goal: Improved gas exchange this shift  Outcome: Progressing  Goal: Improved urinary output this shift  Outcome: Progressing  Goal: Reduction in peripheral edema within 24 hours  Outcome: Progressing  Goal: Report improvement of dyspnea/breathlessness this shift  Outcome: Progressing  Goal: Weight from fluid excess reduced over 2-3 days, then stabilize  Outcome: Progressing  Goal: Increase self care and/or family involvement in 24 hours  Outcome: Progressing     Problem: Pain  Goal: Takes deep breaths with improved pain control throughout the shift  Outcome: Progressing  Goal: Turns in bed with improved pain control throughout the shift  Outcome: Progressing  Goal: Walks with improved pain control throughout the shift  Outcome: Progressing  Goal: Performs ADL's with improved pain control throughout shift  Outcome: Progressing  Goal: Participates in PT with improved pain control throughout the shift  Outcome: Progressing  Goal: Free from opioid side effects throughout the shift  Outcome: Progressing  Goal: Free from acute confusion related to pain meds throughout the shift  Outcome: Progressing

## 2025-01-11 NOTE — PROGRESS NOTES
Subjective Data:   Patient denies CP/SOB at rest. Cr bump w/lasix bolus and gtt yesterday. Inquiring about how much more fluid she needs to diuresis; discussed current data and plan of care.    - give PO metolazone 5mg x1, cont. Gtt at 15mg/hr    Overnight Events:  No acute events overnight.     Objective Data:  Last Recorded Vitals:  Vitals:    01/10/25 2109 01/11/25 0116 01/11/25 0501 01/11/25 0902   BP: 108/69 104/62 120/73 124/73   BP Location: Left arm Right arm Right arm Left arm   Patient Position: Lying Lying Lying Lying   Pulse: 85 79 83 76   Resp: 17 18 17 18   Temp: 37 °C (98.6 °F) 36.5 °C (97.7 °F) 36.7 °C (98.1 °F) 36 °C (96.8 °F)   TempSrc: Temporal Temporal Temporal Temporal   SpO2: 93% 94% 92% 93%   Weight:  108 kg (237 lb 3.2 oz)     Height:         Last Labs:  CBC - 1/10/2025:  8:27 PM  6.6 8.9 330    29.1      CMP - 1/10/2025:  8:27 PM  8.8 6.9 14 --- 0.4   5.9 3.3 12 114      PTT - 4/15/2024:  9:28 PM  0.9   10.3 31     TROPHS   Date/Time Value Ref Range Status   01/02/2025 12:53 PM 21 0 - 34 ng/L Final   02/03/2024 01:22 AM 12 0 - 34 ng/L Final   01/24/2024 08:32 PM 25 0 - 34 ng/L Final   01/25/2023 04:58 PM 7 0 - 34 ng/L Final     Comment:     .  Less than 99th percentile of normal range cutoff-  Female and children under 18 years old <35 ng/L; Male <54 ng/L: Negative  Repeat testing should be performed if clinically indicated.   .  Female and children under 18 years old  ng/L; Male  ng/L:  Consistent with possible cardiac damage and possible increased clinical   risk. Serial measurements may help to assess extent of myocardial damage.   .  >120 ng/L: Consistent with cardiac damage, increased clinical risk and  myocardial infarction. Serial measurements may help assess extent of   myocardial damage.   .   NOTE: Children less than 1 year old may have higher baseline troponin   levels and results should be interpreted in conjunction with the overall   clinical context.  .  NOTE:  Troponin I testing is performed using a different   testing methodology at [facility] than at other   [facility]. Direct result comparisons should only   be made within the same method.     01/25/2023 04:16 PM 8 0 - 34 ng/L Final     Comment:     .  Less than 99th percentile of normal range cutoff-  Female and children under 18 years old <35 ng/L; Male <54 ng/L: Negative  Repeat testing should be performed if clinically indicated.   .  Female and children under 18 years old  ng/L; Male  ng/L:  Consistent with possible cardiac damage and possible increased clinical   risk. Serial measurements may help to assess extent of myocardial damage.   .  >120 ng/L: Consistent with cardiac damage, increased clinical risk and  myocardial infarction. Serial measurements may help assess extent of   myocardial damage.   .   NOTE: Children less than 1 year old may have higher baseline troponin   levels and results should be interpreted in conjunction with the overall   clinical context.  .  NOTE: Troponin I testing is performed using a different   testing methodology at [facility] than at other   [facility]. Direct result comparisons should only   be made within the same method.       BNP   Date/Time Value Ref Range Status   01/04/2025 09:19  0 - 99 pg/mL Final   01/02/2025 12:53  0 - 99 pg/mL Final     HGBA1C   Date/Time Value Ref Range Status   01/02/2025 06:35 PM 8.7 See comment % Final   11/19/2024 02:06 PM 9 4.2 - 6.5 % Final   07/19/2024 08:52 AM 11.2 4.2 - 6.5 % Final     VLDL   Date/Time Value Ref Range Status   05/08/2023 09:35 AM 38 0 - 40 mg/dL Final   04/12/2021 09:06 AM 21 0 - 40 mg/dL Final   10/20/2020 12:36 PM 24 0 - 40 mg/dL Final      Last I/O:  I/O last 3 completed shifts:  In: 1346.2 (12.5 mL/kg) [P.O.:1320; I.V.:26.2 (0.2 mL/kg)]  Out: 2300 (21.4 mL/kg) [Urine:2300 (0.6 mL/kg/hr)]  Weight: 107.6 kg     Past Cardiology Tests (Last 3  Years):    Echo:  Transthoracic Echo (TTE) Complete 01/25/2024     PHYSICIAN INTERPRETATION:  Left Ventricle: The left ventricular systolic function is hyperdynamic, with an estimated ejection fraction of 70-75%. There are no regional wall motion abnormalities. The left ventricular cavity size is normal. The left ventricular septal wall thickness is mildly increased. There is mild concentric left ventricular hypertrophy. Spectral Doppler shows a normal pattern of left ventricular diastolic filling.  Left Atrium: The left atrium is mildly dilated.  Right Ventricle: The right ventricle was not well visualized. There is normal right ventricular global systolic function.  Right Atrium: The right atrium is normal in size.  Aortic Valve: The aortic valve is trileaflet. There is no evidence of aortic valve regurgitation. The peak instantaneous gradient of the aortic valve is 10.0 mmHg. The mean gradient of the aortic valve is 5.0 mmHg.  Mitral Valve: The mitral valve is normal in structure. There is trace to mild mitral valve regurgitation.  Tricuspid Valve: The tricuspid valve is structurally normal. There is trace to mild tricuspid regurgitation. The Doppler estimated RVSP is within normal limits at 32.4 mmHg.  Pulmonic Valve: The pulmonic valve is structurally normal. There is physiologic pulmonic valve regurgitation.  Pericardium: There is a trivial pericardial effusion.  Pleural: There is left pleural effusion.  Aorta: The aortic root is normal.    CONCLUSIONS:  1. Left ventricular systolic function is hyperdynamic with a 70-75% estimated ejection fraction.  2. RVSP within normal limits.    ** Final **    Transthoracic Echo (TTE) Complete 01/3/2025  CONCLUSIONS:   1. Left ventricular ejection fraction is normal, calculated by Argueta's biplane at 69%.   2. Spectral Doppler shows a Grade II (pseudonormal pattern) of left ventricular diastolic filling with an elevated left atrial pressure.   3. There is normal right  ventricular global systolic function.   4. The left atrium is moderately dilated.   5. Moderate mitral valve regurgitation.   6. Mechanism of MR Is possibly restricted motion of the posterior leaflet.   7. Moderate tricuspid regurgitation visualized.   8. Moderately elevated right ventricular systolic pressure.   9. Small pericardial effusion.  10. Compared with study dated 1/25/2024, the degree of MR appears moderate today rather than mild on prior study. TR is also worsened.    Inpatient Medications:  Scheduled medications   Medication Dose Route Frequency    amLODIPine  10 mg oral Daily    aspirin  81 mg oral Daily    atorvastatin  40 mg oral Nightly    [Held by provider] empagliflozin  10 mg oral Daily    folic acid  1 mg oral Daily    gabapentin  300 mg oral BID    heparin (porcine)  7,500 Units subcutaneous q8h NEHEMIAS    hydrALAZINE  50 mg oral TID    insulin glargine  16 Units subcutaneous Daily    insulin lispro  0-10 Units subcutaneous TID AC    isosorbide dinitrate  10 mg oral TID    polyethylene glycol  17 g oral Daily    vitamin B complex-vitamin C-folic acid  1 capsule oral Daily     PRN medications   Medication    acetaminophen    albuterol    dextrose    dextrose    glucagon    glucagon    oxygen    oxygen     Continuous Medications   Medication Dose Last Rate    furosemide  15 mg/hr 15 mg/hr (01/11/25 0540)     Physical Exam:  General: NAD, lying in bed  Skin: warm and dry throughout  Head/ neck: +JVD above mid-neck w/HOB at 45 degrees  Cardiac: RRR, S1S2S3, +murmur  Pulm: +rales in BLL, on 2L O2 N/C (not baseline)  GI: soft, nontender, non-distended, +BS  Extremities: +1 edema in BLE up to shins  Neuro: no focal neuro deficits, A&Ox4  Psych: appropriate mood and behavior, pleasant       Assessment/Plan   Nuris Squires is a 59 y.o. female presenting with PMH of poorly controlled insulin dependent type 2 diabetes c/b neuropathy, hyperlipidemia, PAD, CKD stage IV, hypertension, multiple foot infections  s/p partial R great toe amputation, fall w/L femur fracture s/p ORIF (4/2024) that presents to the emergency department today for shortness of breath x1 day. Admitted under [facility]VI Service for further management of acute ADHF and HTN Urgency.     Acute diastolic heart failure  Chest tightness, resolved  - likely 2/2 uncontrolled HTN, c/f high-output HF 2/2 obesity  - TTE (1/2024): EF 70-75%  - TTE 1/3/2025: EF 69%, grade II diastolic filling, moderate MR, moderate TR, small pericardial effusion  - neg stress 10/2019  - CXR: pulm vascular congestion BL, small bibasilar pleural effusions  - Last BNP (1/5) 161 (376 on admit iso obesity)  - HS trop 21, no ischemic changes on ECG, no c/f ACS  - dry weight ~100kg  - admit weight: 107kg  - daily weight unchanged: 108kg (108, 107, 106 kg)  - RHC (1/8): reviewed tracings with Dr. Mckee, numbers are questionable but RVEDP 20, PA 75/25, unclear wedge  - of note, pharmacy verified patient on PO lasix 20mg daily at home (?for unclear reasons, presumably prescribed by PCP for leg swelling)-> per patient, was just started on it at home  - s/p diuresis w/IVP Lasix boluses w/inadequate response--->give PO metolazone 5mg x1, cont. Gtt at 15mg/hr (started on 1/10 followed by 80mg IVP bolus x1; only voided 1.7L in past 24hrs)  - cont. HOLDING empa 10mg daily iso rising Cr (held since 1/7, started in-house)  - avoid ACEI/ARB/ARNI, MRA for now d/t rising Cr  - cont. Hydral 50mg TID, isordil 10mg TID  - HF navigator following, establishing care here  - Daily standing weights, strict I&O's, 2g sodium diet, 2L fluid restriction      Acute hypoxic respiratory failure iso of ADHF exacerbation, improving  Pulmonary HTN  - p.ox initially at 61% but improved rapidly with placement of nasal cannula  - She was requiring 6 L of oxygen, BiPAP briefly due to the concern for SCAPE, received SL nitro and she was able to wean off of BiPAP  - currently on 2L O2 N/C, wean O2 as tolerated  - VQ scan  (1/9; for pulm HTN given high PA on RHC): low probability of PE  - Flu A/B/RSV/COVID negative  - diuresis plan as above  - pulmonary toileting w/incentive spirometry     HTN urgency, improving  - BP on on admit: 220/98  - SBP improved to 143 after resumption of home meds and s/p IVP Hydral 5mg x1  - last 24 hour SBP range: 120-130s  - BPs labile, clonidine weaned off on 1/5, intermittent headaches w/isordil (tolerating 10mg TID)  - cont. BP meds as above, home amlodipine 10mg daily     CKD IV, worsening  - Cr last year 1.7-5.3, worsening since 2022  - admit Cr 2.34  - daily Cr 3.85 (3.61, 3.73, 3.82,3.75,2.93,2.74, 2.55, 2.49, 2.31)  - urine lytes, osmolality reviewed, FENa 5.6% suggesting intrinsic cause  - diuresis plan as above--->?consider renal consult if no response to aggressive diuresis     Insulin dependent Type 2 diabetes  Poorly controlled  - Follow with clinical pharmacy closely/PCP  - Hgb A1c 8.7% (improved from previous 11.9%)  - home regimen: Mounjaro 2.5mg weekly, basaglar 32 units every morning, admelog SS with meals  - c/w lantus 16 units every morning (decreased from 32U iso AM BG of 62) and SSI #2 while inpatient  - plans w/empa (as above)  - OARRS reviewed, cont. Home gabapentin 300mg BID      HLD  PAD  - c/w asa 81mg daily, atorva 40mg daily        DVT ppx: subQ heparin  Emergency contacts: Boyfrienterrie Rousseau #[phone]  Brother Luis Squires #[phone]  DISPO: previously received HHC w/[facility]; likely resume at discharge   - HF navigator following, established care here  - discharge home pending further diuresis & improvement in Cr    All labs, vital signs, tests & imaging results, and medications were reviewed.     Patient seen and discussed with Dr. Parrish    Code Status:  Full Code    Betty Martin, APRN-CNP

## 2025-01-12 VITALS
WEIGHT: 229.28 LBS | SYSTOLIC BLOOD PRESSURE: 103 MMHG | BODY MASS INDEX: 42.19 KG/M2 | HEART RATE: 80 BPM | RESPIRATION RATE: 18 BRPM | DIASTOLIC BLOOD PRESSURE: 66 MMHG | HEIGHT: 62 IN | OXYGEN SATURATION: 93 % | TEMPERATURE: 97.3 F

## 2025-01-12 LAB
ALBUMIN SERPL BCP-MCNC: 3.4 G/DL (ref 3.4–5)
ANION GAP SERPL CALC-SCNC: 21 MMOL/L (ref 10–20)
BUN SERPL-MCNC: 88 MG/DL (ref 6–23)
CALCIUM SERPL-MCNC: 9 MG/DL (ref 8.6–10.6)
CHLORIDE SERPL-SCNC: 96 MMOL/L (ref 98–107)
CO2 SERPL-SCNC: 25 MMOL/L (ref 21–32)
CREAT SERPL-MCNC: 4.06 MG/DL (ref 0.5–1.05)
EGFRCR SERPLBLD CKD-EPI 2021: 12 ML/MIN/1.73M*2
FERRITIN SERPL-MCNC: 62 NG/ML (ref 8–150)
FOLATE SERPL-MCNC: 22.2 NG/ML
GLUCOSE BLD MANUAL STRIP-MCNC: 166 MG/DL (ref 74–99)
GLUCOSE BLD MANUAL STRIP-MCNC: 206 MG/DL (ref 74–99)
GLUCOSE BLD MANUAL STRIP-MCNC: 209 MG/DL (ref 74–99)
GLUCOSE BLD MANUAL STRIP-MCNC: 85 MG/DL (ref 74–99)
GLUCOSE SERPL-MCNC: 216 MG/DL (ref 74–99)
IRON SATN MFR SERPL: 33 % (ref 25–45)
IRON SERPL-MCNC: 100 UG/DL (ref 35–150)
MAGNESIUM SERPL-MCNC: 2.6 MG/DL (ref 1.6–2.4)
PHOSPHATE SERPL-MCNC: 7.1 MG/DL (ref 2.5–4.9)
POTASSIUM SERPL-SCNC: 5 MMOL/L (ref 3.5–5.3)
SODIUM SERPL-SCNC: 137 MMOL/L (ref 136–145)
TIBC SERPL-MCNC: 303 UG/DL (ref 240–445)
UIBC SERPL-MCNC: 203 UG/DL (ref 110–370)
VIT B12 SERPL-MCNC: 805 PG/ML (ref 211–911)

## 2025-01-12 PROCEDURE — 83735 ASSAY OF MAGNESIUM: CPT | Performed by: NURSE PRACTITIONER

## 2025-01-12 PROCEDURE — 2500000002 HC RX 250 W HCPCS SELF ADMINISTERED DRUGS (ALT 637 FOR MEDICARE OP, ALT 636 FOR OP/ED): Performed by: NURSE PRACTITIONER

## 2025-01-12 PROCEDURE — 82947 ASSAY GLUCOSE BLOOD QUANT: CPT

## 2025-01-12 PROCEDURE — 1200000002 HC GENERAL ROOM WITH TELEMETRY DAILY

## 2025-01-12 PROCEDURE — 2500000001 HC RX 250 WO HCPCS SELF ADMINISTERED DRUGS (ALT 637 FOR MEDICARE OP)

## 2025-01-12 PROCEDURE — 82746 ASSAY OF FOLIC ACID SERUM: CPT | Performed by: NURSE PRACTITIONER

## 2025-01-12 PROCEDURE — 99232 SBSQ HOSP IP/OBS MODERATE 35: CPT | Performed by: STUDENT IN AN ORGANIZED HEALTH CARE EDUCATION/TRAINING PROGRAM

## 2025-01-12 PROCEDURE — 2500000004 HC RX 250 GENERAL PHARMACY W/ HCPCS (ALT 636 FOR OP/ED): Performed by: NURSE PRACTITIONER

## 2025-01-12 PROCEDURE — 2500000001 HC RX 250 WO HCPCS SELF ADMINISTERED DRUGS (ALT 637 FOR MEDICARE OP): Performed by: STUDENT IN AN ORGANIZED HEALTH CARE EDUCATION/TRAINING PROGRAM

## 2025-01-12 PROCEDURE — 2500000002 HC RX 250 W HCPCS SELF ADMINISTERED DRUGS (ALT 637 FOR MEDICARE OP, ALT 636 FOR OP/ED)

## 2025-01-12 PROCEDURE — 82607 VITAMIN B-12: CPT | Performed by: NURSE PRACTITIONER

## 2025-01-12 PROCEDURE — 99222 1ST HOSP IP/OBS MODERATE 55: CPT | Performed by: INTERNAL MEDICINE

## 2025-01-12 PROCEDURE — 36415 COLL VENOUS BLD VENIPUNCTURE: CPT | Performed by: NURSE PRACTITIONER

## 2025-01-12 PROCEDURE — 80069 RENAL FUNCTION PANEL: CPT | Performed by: NURSE PRACTITIONER

## 2025-01-12 PROCEDURE — 2500000001 HC RX 250 WO HCPCS SELF ADMINISTERED DRUGS (ALT 637 FOR MEDICARE OP): Performed by: NURSE PRACTITIONER

## 2025-01-12 RX ORDER — BISACODYL 5 MG
10 TABLET, DELAYED RELEASE (ENTERIC COATED) ORAL DAILY PRN
Status: DISCONTINUED | OUTPATIENT
Start: 2025-01-12 | End: 2025-01-23 | Stop reason: HOSPADM

## 2025-01-12 RX ORDER — AMOXICILLIN 250 MG
2 CAPSULE ORAL 2 TIMES DAILY
Status: DISCONTINUED | OUTPATIENT
Start: 2025-01-12 | End: 2025-01-23 | Stop reason: HOSPADM

## 2025-01-12 RX ORDER — CAPSAICIN 0.03 G/100G
CREAM TOPICAL 3 TIMES DAILY
Status: DISCONTINUED | OUTPATIENT
Start: 2025-01-12 | End: 2025-01-23 | Stop reason: HOSPADM

## 2025-01-12 RX ORDER — CAPSAICIN 0 G/G
CREAM TOPICAL 3 TIMES DAILY
Status: DISCONTINUED | OUTPATIENT
Start: 2025-01-12 | End: 2025-01-12

## 2025-01-12 RX ORDER — TORSEMIDE 20 MG/1
60 TABLET ORAL
Status: DISCONTINUED | OUTPATIENT
Start: 2025-01-12 | End: 2025-01-23 | Stop reason: HOSPADM

## 2025-01-12 RX ADMIN — GABAPENTIN 300 MG: 300 CAPSULE ORAL at 21:24

## 2025-01-12 RX ADMIN — CAPSAICIN: 0.25 CREAM TOPICAL at 22:01

## 2025-01-12 RX ADMIN — HEPARIN SODIUM 7500 UNITS: 5000 INJECTION INTRAVENOUS; SUBCUTANEOUS at 21:24

## 2025-01-12 RX ADMIN — GABAPENTIN 300 MG: 300 CAPSULE ORAL at 08:57

## 2025-01-12 RX ADMIN — HEPARIN SODIUM 7500 UNITS: 5000 INJECTION INTRAVENOUS; SUBCUTANEOUS at 05:10

## 2025-01-12 RX ADMIN — ASPIRIN 81 MG: 81 TABLET, CHEWABLE ORAL at 08:57

## 2025-01-12 RX ADMIN — NEPHROCAP 1 CAPSULE: 1 CAP ORAL at 08:57

## 2025-01-12 RX ADMIN — INSULIN LISPRO 2 UNITS: 100 INJECTION, SOLUTION INTRAVENOUS; SUBCUTANEOUS at 09:03

## 2025-01-12 RX ADMIN — FOLIC ACID 1 MG: 1 TABLET ORAL at 08:57

## 2025-01-12 RX ADMIN — SENNOSIDES AND DOCUSATE SODIUM 2 TABLET: 50; 8.6 TABLET ORAL at 13:55

## 2025-01-12 RX ADMIN — AMLODIPINE BESYLATE 10 MG: 10 TABLET ORAL at 08:57

## 2025-01-12 RX ADMIN — HYDRALAZINE HYDROCHLORIDE 50 MG: 50 TABLET ORAL at 08:57

## 2025-01-12 RX ADMIN — POLYETHYLENE GLYCOL 3350 17 G: 17 POWDER, FOR SOLUTION ORAL at 08:57

## 2025-01-12 RX ADMIN — TORSEMIDE 60 MG: 20 TABLET ORAL at 16:43

## 2025-01-12 RX ADMIN — ACETAMINOPHEN 650 MG: 325 TABLET ORAL at 16:43

## 2025-01-12 RX ADMIN — ISOSORBIDE DINITRATE 10 MG: 10 TABLET ORAL at 13:50

## 2025-01-12 RX ADMIN — CAPSAICIN: 0.25 CREAM TOPICAL at 14:00

## 2025-01-12 RX ADMIN — IRON SUCROSE 200 MG: 20 INJECTION, SOLUTION INTRAVENOUS at 21:24

## 2025-01-12 RX ADMIN — HEPARIN SODIUM 7500 UNITS: 5000 INJECTION INTRAVENOUS; SUBCUTANEOUS at 13:50

## 2025-01-12 RX ADMIN — INSULIN GLARGINE 16 UNITS: 100 INJECTION, SOLUTION SUBCUTANEOUS at 10:29

## 2025-01-12 RX ADMIN — ISOSORBIDE DINITRATE 10 MG: 10 TABLET ORAL at 19:17

## 2025-01-12 RX ADMIN — HYDRALAZINE HYDROCHLORIDE 50 MG: 50 TABLET ORAL at 14:00

## 2025-01-12 RX ADMIN — INSULIN LISPRO 4 UNITS: 100 INJECTION, SOLUTION INTRAVENOUS; SUBCUTANEOUS at 13:56

## 2025-01-12 RX ADMIN — SENNOSIDES AND DOCUSATE SODIUM 2 TABLET: 50; 8.6 TABLET ORAL at 21:24

## 2025-01-12 RX ADMIN — HYDRALAZINE HYDROCHLORIDE 50 MG: 50 TABLET ORAL at 21:24

## 2025-01-12 RX ADMIN — ISOSORBIDE DINITRATE 10 MG: 10 TABLET ORAL at 08:57

## 2025-01-12 RX ADMIN — ATORVASTATIN CALCIUM 40 MG: 40 TABLET, FILM COATED ORAL at 21:24

## 2025-01-12 ASSESSMENT — COGNITIVE AND FUNCTIONAL STATUS - GENERAL
DAILY ACTIVITIY SCORE: 24
MOBILITY SCORE: 22
CLIMB 3 TO 5 STEPS WITH RAILING: A LITTLE
DAILY ACTIVITIY SCORE: 24
WALKING IN HOSPITAL ROOM: A LITTLE
WALKING IN HOSPITAL ROOM: A LITTLE
CLIMB 3 TO 5 STEPS WITH RAILING: A LITTLE
MOBILITY SCORE: 22

## 2025-01-12 ASSESSMENT — PAIN SCALES - GENERAL
PAINLEVEL_OUTOF10: 0 - NO PAIN
PAINLEVEL_OUTOF10: 0 - NO PAIN

## 2025-01-12 ASSESSMENT — PAIN - FUNCTIONAL ASSESSMENT: PAIN_FUNCTIONAL_ASSESSMENT: 0-10

## 2025-01-12 NOTE — PROGRESS NOTES
Subjective Data:   Patient denies CP/SOB at rest. Cr plateau; Weight down, overtly euvolemic on exam, on RA. Discussed HFpEF and CKD disease processes, and the contributory obesity and diabetes co-morbidities. She reports experiencing intermittent numbness/tingling of her mouth/tongue and hands; airway and swallowing unaffected. Also c/o constipation.    - STOP lasix gtt, transition to PO torsemide 60mg BID starting tonight   - renal consult  - topical capsaicin for bilat hands, monitor mouth s/sx  - bowel regimen    Overnight Events:  Feels drowsy this AM, reports p.ox dropped overnight and she needed to take deep breaths for it to improve, remains on RA. No acute events overnight.     Objective Data:  Last Recorded Vitals:  Vitals:    01/11/25 2011 01/12/25 0009 01/12/25 0430 01/12/25 0749   BP: 132/53 113/54 141/50 137/66   BP Location: Right arm   Left arm   Patient Position: Lying Lying Lying Sitting   Pulse:  80 82 78   Resp:  15 15 19   Temp: 36 °C (96.8 °F) 36.3 °C (97.3 °F) 36.2 °C (97.2 °F) 35.8 °C (96.4 °F)   TempSrc: Temporal   Temporal   SpO2: 94% 92% 96% 95%   Weight:   104 kg (229 lb 4.5 oz)    Height:         Last Labs:  CBC - 1/11/2025:  7:24 PM  6.5 9.9 383    30.0      CMP - 1/11/2025:  7:24 PM  9.4 6.9 14 --- 0.4   6.5 3.5 12 114      PTT - 4/15/2024:  9:28 PM  0.9   10.3 31     TROPHS   Date/Time Value Ref Range Status   01/02/2025 12:53 PM 21 0 - 34 ng/L Final   02/03/2024 01:22 AM 12 0 - 34 ng/L Final   01/24/2024 08:32 PM 25 0 - 34 ng/L Final   01/25/2023 04:58 PM 7 0 - 34 ng/L Final     Comment:     .  Less than 99th percentile of normal range cutoff-  Female and children under 18 years old <35 ng/L; Male <54 ng/L: Negative  Repeat testing should be performed if clinically indicated.   .  Female and children under 18 years old  ng/L; Male  ng/L:  Consistent with possible cardiac damage and possible increased clinical   risk. Serial measurements may help to assess extent of  myocardial damage.   .  >120 ng/L: Consistent with cardiac damage, increased clinical risk and  myocardial infarction. Serial measurements may help assess extent of   myocardial damage.   .   NOTE: Children less than 1 year old may have higher baseline troponin   levels and results should be interpreted in conjunction with the overall   clinical context.  .  NOTE: Troponin I testing is performed using a different   testing methodology at [facility] than at other   system [facility]. Direct result comparisons should only   be made within the same method.     01/25/2023 04:16 PM 8 0 - 34 ng/L Final     Comment:     .  Less than 99th percentile of normal range cutoff-  Female and children under 18 years old <35 ng/L; Male <54 ng/L: Negative  Repeat testing should be performed if clinically indicated.   .  Female and children under 18 years old  ng/L; Male  ng/L:  Consistent with possible cardiac damage and possible increased clinical   risk. Serial measurements may help to assess extent of myocardial damage.   .  >120 ng/L: Consistent with cardiac damage, increased clinical risk and  myocardial infarction. Serial measurements may help assess extent of   myocardial damage.   .   NOTE: Children less than 1 year old may have higher baseline troponin   levels and results should be interpreted in conjunction with the overall   clinical context.  .  NOTE: Troponin I testing is performed using a different   testing methodology at [facility] than at other   [facility]. Direct result comparisons should only   be made within the same method.       BNP   Date/Time Value Ref Range Status   01/04/2025 09:19  0 - 99 pg/mL Final   01/02/2025 12:53  0 - 99 pg/mL Final     HGBA1C   Date/Time Value Ref Range Status   01/02/2025 06:35 PM 8.7 See comment % Final   11/19/2024 02:06 PM 9 4.2 - 6.5 % Final   07/19/2024 08:52 AM 11.2 4.2 - 6.5 % Final     VLDL   Date/Time Value Ref  Range Status   05/08/2023 09:35 AM 38 0 - 40 mg/dL Final   04/12/2021 09:06 AM 21 0 - 40 mg/dL Final   10/20/2020 12:36 PM 24 0 - 40 mg/dL Final      Last I/O:  I/O last 3 completed shifts:  In: 326.2 (3.1 mL/kg) [P.O.:300; I.V.:26.2 (0.3 mL/kg)]  Out: 2300 (22.1 mL/kg) [Urine:2300 (0.6 mL/kg/hr)]  Weight: 104 kg     Past Cardiology Tests (Last 3 Years):    Echo:  Transthoracic Echo (TTE) Complete 01/25/2024     PHYSICIAN INTERPRETATION:  Left Ventricle: The left ventricular systolic function is hyperdynamic, with an estimated ejection fraction of 70-75%. There are no regional wall motion abnormalities. The left ventricular cavity size is normal. The left ventricular septal wall thickness is mildly increased. There is mild concentric left ventricular hypertrophy. Spectral Doppler shows a normal pattern of left ventricular diastolic filling.  Left Atrium: The left atrium is mildly dilated.  Right Ventricle: The right ventricle was not well visualized. There is normal right ventricular global systolic function.  Right Atrium: The right atrium is normal in size.  Aortic Valve: The aortic valve is trileaflet. There is no evidence of aortic valve regurgitation. The peak instantaneous gradient of the aortic valve is 10.0 mmHg. The mean gradient of the aortic valve is 5.0 mmHg.  Mitral Valve: The mitral valve is normal in structure. There is trace to mild mitral valve regurgitation.  Tricuspid Valve: The tricuspid valve is structurally normal. There is trace to mild tricuspid regurgitation. The Doppler estimated RVSP is within normal limits at 32.4 mmHg.  Pulmonic Valve: The pulmonic valve is structurally normal. There is physiologic pulmonic valve regurgitation.  Pericardium: There is a trivial pericardial effusion.  Pleural: There is left pleural effusion.  Aorta: The aortic root is normal.    CONCLUSIONS:  1. Left ventricular systolic function is hyperdynamic with a 70-75% estimated ejection fraction.  2. RVSP within  normal limits.    ** Final **    Transthoracic Echo (TTE) Complete 01/3/2025  CONCLUSIONS:   1. Left ventricular ejection fraction is normal, calculated by Argueta's biplane at 69%.   2. Spectral Doppler shows a Grade II (pseudonormal pattern) of left ventricular diastolic filling with an elevated left atrial pressure.   3. There is normal right ventricular global systolic function.   4. The left atrium is moderately dilated.   5. Moderate mitral valve regurgitation.   6. Mechanism of MR Is possibly restricted motion of the posterior leaflet.   7. Moderate tricuspid regurgitation visualized.   8. Moderately elevated right ventricular systolic pressure.   9. Small pericardial effusion.  10. Compared with study dated 1/25/2024, the degree of MR appears moderate today rather than mild on prior study. TR is also worsened.    Inpatient Medications:  Scheduled medications   Medication Dose Route Frequency    amLODIPine  10 mg oral Daily    aspirin  81 mg oral Daily    atorvastatin  40 mg oral Nightly    capsaicin   Topical TID    [Held by provider] empagliflozin  10 mg oral Daily    folic acid  1 mg oral Daily    gabapentin  300 mg oral BID    heparin (porcine)  7,500 Units subcutaneous q8h NEHEMIAS    hydrALAZINE  50 mg oral TID    insulin glargine  16 Units subcutaneous Daily    insulin lispro  0-10 Units subcutaneous TID AC    isosorbide dinitrate  10 mg oral TID    polyethylene glycol  17 g oral Daily    sennosides-docusate sodium  2 tablet oral BID    torsemide  60 mg oral BID    vitamin B complex-vitamin C-folic acid  1 capsule oral Daily     PRN medications   Medication    acetaminophen    albuterol    bisacodyl    dextrose    dextrose    glucagon    glucagon    oxygen    oxygen     Continuous Medications   Medication Dose Last Rate     Physical Exam:  General: NAD, lying in bed  Skin: warm and dry throughout  Head/ neck: unable to appreciate JVD  Cardiac: RRR, S1S2S3  Pulm: diminished in BLL, on RA  GI: soft,  nontender, non-distended, +BS  Extremities: trace non-pitting edema in BLE  Neuro: no focal neuro deficits, A&Ox4  Psych: appropriate mood and behavior, very pleasant       Assessment/Plan   Nuris Squires is a 59 y.o. female presenting with PMH of poorly controlled insulin dependent type 2 diabetes c/b neuropathy, hyperlipidemia, PAD, CKD stage IV, hypertension, multiple foot infections s/p partial R great toe amputation, fall w/L femur fracture s/p ORIF (4/2024) that presents to the emergency department today for shortness of breath x1 day. Admitted under HHVI Service for further management of acute ADHF and HTN Urgency.     Acute diastolic heart failure  Chest tightness, resolved  - likely 2/2 uncontrolled HTN, c/f high-output HF 2/2 obesity  - TTE (1/2024): EF 70-75%  - TTE 1/3/2025: EF 69%, grade II diastolic filling, moderate MR, moderate TR, small pericardial effusion  - neg stress 10/2019  - CXR: pulm vascular congestion BL, small bibasilar pleural effusions  - Last BNP (1/5) 161 (376 on admit iso obesity)  - HS trop 21, no ischemic changes on ECG, no c/f ACS  - dry weight ~100kg  - admit weight: 107kg  - daily weight: 104kg (108, 108, 107, 106 kg)  - RHC (1/8): reviewed tracings with Dr. Mckee, numbers are questionable but RVEDP 20, PA 75/25, unclear wedge  - of note, pharmacy verified patient on PO lasix 20mg daily at home (?for unclear reasons, presumably prescribed by PCP for leg swelling)-> per patient, was just started on it at home  - s/p diuresis w/IVP Lasix boluses w/inadequate response, s/p metolazone 5mg x1 w/lasig gtt at 15mg/hr (on 1/11)--->STOP lasix gtt, transition to PO torsemide 60mg BID starting tonight, may need PRN metolazone at d/c    - cont. HOLDING empa 10mg daily iso rising Cr (held since 1/7, started in-house)  - avoid ACEI/ARB/ARNI, MRA for now d/t rising Cr  - cont. Hydral 50mg TID, isordil 10mg TID  - HF navigator following, establishing care here  - Daily standing weights,  strict I&O's, 2g sodium diet, 2L fluid restriction     CKD IV, worsening  - Cr last year 1.7-5.3, worsening since 2022  - admit Cr 2.34  - daily Cr 3.89 (3.85, 3.61, 3.73, 3.82,3.75,2.93,2.74, 2.55, 2.49, 2.31)  - urine lytes, osmolality reviewed, FENa 5.6% suggesting intrinsic cause  - diuresis plan as above--->consult renal, formal recs to follow    Acute hypoxic respiratory failure iso of ADHF exacerbation, improving  Pulmonary HTN  - p.ox initially at 61% but improved rapidly with placement of nasal cannula  - She was requiring 6 L of oxygen, BiPAP briefly due to the concern for SCAPE, received SL nitro and she was able to wean off of BiPAP  -  weaned from 2L O2 to RA, however, desats at night, c/f RENZO (see below)  - VQ scan (1/9; for pulm HTN given high PA on RHC): low probability of PE  - Flu A/B/RSV/COVID negative  - diuresis plan as above  - pulmonary toileting w/incentive spirometry     HTN urgency, resolved  - BP on on admit: 220/98  - SBP improved to 143 after resumption of home meds and s/p IVP Hydral 5mg x1  - last 24 hour SBP range: 120-140s  - BPs labile, clonidine weaned off on 1/5, intermittent headaches w/isordil (tolerating 10mg TID)  - cont. BP meds as above, home amlodipine 10mg daily    Obesity  Insulin dependent Type 2 diabetes, Poorly controlled  Neuropathy  - Follows with clinical pharmacy closely/PCP  - Hgb A1c 8.7% (improved from previous 11.9%)  - BMI: 41.94  - home regimen: Mounjaro 2.5mg weekly, basaglar 32 units every morning, admelog SS with meals  - c/w lantus 16 units every morning (decreased from 32U iso AM BG of 62) and SSI #2 while inpatient  - plans w/empa (as above)  - reported numbness/tingling in mouth and bilat hands; likely 2/2 aggressive diuresis, topical capsaicin for hands, monitor  - OARRS reviewed, cont. Home gabapentin 300mg BID      HLD  PAD  - c/w asa 81mg daily, atorva 40mg daily     C/f RENZO  - reported increased daytime fatigue  - desats on RA in-house while  sleeping  - place sleep study referral    Constipation  - cont. Bowel regimen  - encourage frequent ambulation       DVT ppx: subQ heparin  Emergency contacts: Boyfrienterrie Rousseau #[phone]  Brother Luis Squires #[phone]  DISPO: previously received HHC w/[facility]; likely resume at discharge   - HF navigator following, established care here  - discharge home ?tomorrow at the earliest, pending response to oral diuretics, improvement in Cr, & renal's recs    All labs, vital signs, tests & imaging results, and medications were reviewed.     Patient seen and discussed with Dr. Parrish    Code Status:  Full Code    Betty Martin, APRN-CNP

## 2025-01-12 NOTE — CARE PLAN
The patient's goals for the shift include rest    The clinical goals for the shift include Pt will remain free of falls throughout shift   Problem: Pain - Adult  Goal: Verbalizes/displays adequate comfort level or baseline comfort level  Outcome: Progressing     Problem: Safety - Adult  Goal: Free from fall injury  Outcome: Progressing     Problem: Discharge Planning  Goal: Discharge to home or other facility with appropriate resources  Outcome: Progressing     Problem: Chronic Conditions and Co-morbidities  Goal: Patient's chronic conditions and co-morbidity symptoms are monitored and maintained or improved  Outcome: Progressing     Problem: Skin  Goal: Decreased wound size/increased tissue granulation at next dressing change  Outcome: Progressing  Goal: Participates in plan/prevention/treatment measures  Outcome: Progressing  Goal: Prevent/manage excess moisture  Outcome: Progressing  Goal: Prevent/minimize sheer/friction injuries  Outcome: Progressing  Goal: Promote/optimize nutrition  Outcome: Progressing  Goal: Promote skin healing  Outcome: Progressing     Problem: Fall/Injury  Goal: Not fall by end of shift  Outcome: Progressing  Goal: Be free from injury by end of the shift  Outcome: Progressing  Goal: Verbalize understanding of personal risk factors for fall in the hospital  Outcome: Progressing  Goal: Verbalize understanding of risk factor reduction measures to prevent injury from fall in the home  Outcome: Progressing  Goal: Use assistive devices by end of the shift  Outcome: Progressing  Goal: Pace activities to prevent fatigue by end of the shift  Outcome: Progressing     Problem: Diabetes  Goal: Achieve decreasing blood glucose levels by end of shift  Outcome: Progressing  Goal: Increase stability of blood glucose readings by end of shift  Outcome: Progressing  Goal: Decrease in ketones present in urine by end of shift  Outcome: Progressing  Goal: Maintain electrolyte levels within acceptable range  throughout shift  Outcome: Progressing  Goal: Maintain glucose levels >70mg/dl to <250mg/dl throughout shift  Outcome: Progressing  Goal: No changes in neurological exam by end of shift  Outcome: Progressing  Goal: Learn about and adhere to nutrition recommendations by end of shift  Outcome: Progressing  Goal: Vital signs within normal range for age by end of shift  Outcome: Progressing  Goal: Increase self care and/or family involovement by end of shift  Outcome: Progressing  Goal: Receive DSME education by end of shift  Outcome: Progressing     Problem: Heart Failure  Goal: Improved gas exchange this shift  Outcome: Progressing  Goal: Improved urinary output this shift  Outcome: Progressing  Goal: Reduction in peripheral edema within 24 hours  Outcome: Progressing  Goal: Report improvement of dyspnea/breathlessness this shift  Outcome: Progressing  Goal: Weight from fluid excess reduced over 2-3 days, then stabilize  Outcome: Progressing  Goal: Increase self care and/or family involvement in 24 hours  Outcome: Progressing     Problem: Pain  Goal: Takes deep breaths with improved pain control throughout the shift  Outcome: Progressing  Goal: Turns in bed with improved pain control throughout the shift  Outcome: Progressing  Goal: Walks with improved pain control throughout the shift  Outcome: Progressing  Goal: Performs ADL's with improved pain control throughout shift  Outcome: Progressing  Goal: Participates in PT with improved pain control throughout the shift  Outcome: Progressing  Goal: Free from opioid side effects throughout the shift  Outcome: Progressing  Goal: Free from acute confusion related to pain meds throughout the shift  Outcome: Progressing

## 2025-01-12 NOTE — CONSULTS
"NEPHROLOGY NEW CONSULT NOTE   Nuris Squires   59 y.o.    @WT@  MRN/Room: [id number]    Reason for consult: VENKAT    HPI:  Nuris Squires is a 59 y.o. female presenting with PMH of poorly controlled insulin dependent type 2 diabetes c/b neuropathy, hyperlipidemia, PAD, CKD stage IV, hypertension, multiple foot infections s/p partial R great toe amputation, fall w/L femur fracture s/p ORIF (4/2024) that presents to the emergency department for SOB. Admitted under HHVI Service for further management of acute ADHF and HTN Urgency.       Pt initially presented hypoxic, hypertensive and hypervolemic. She was started on lasix IVP and on 1/8 had RHC showing PWC 24/21, RA 20/17, CO 10, CI 5 signs of fluid overload. Continued on IV diuresis and on 1/10 started on lasix gtt. Wt 107->104 today. Having good urinary response.   Creatinine started to rise 1/5 and has remained overall stable ~3.6-3.8 the last few days. Upon review pt does have some episodes of hypotension with sBP 90s.   At home pt typically is hypertensive with sBP > 140. She has long standing history of DM with retinopathy as well. Does not see nephrology outpatient. States her LE swelling and shortness of breath are improving since admission.       In The ER: /65 (Patient Position: Lying)   Pulse 78   Temp 35.6 °C (96.1 °F) (Temporal)   Resp 19   Ht 1.575 m (5' 2\")   Wt 104 kg (229 lb 4.5 oz)   SpO2 91%   BMI 41.94 kg/m²      Past Medical History:   Diagnosis Date    Personal history of other diseases of the circulatory system     History of hypertension    Personal history of other endocrine, nutritional and metabolic disease     History of hyperlipidemia    Personal history of other endocrine, nutritional and metabolic disease     History of diabetes mellitus      Past Surgical History:   Procedure Laterality Date    CARDIAC CATHETERIZATION N/A 1/8/2025    Procedure: Right Heart Cath;  Surgeon: Migel Stanton MD;  Location: [address]" Cardiac Cath Lab;  Service: Cardiovascular;  Laterality: N/A;    CT ANGIO NECK  1/25/2023    CT NECK ANGIO W AND WO IV CONTRAST 1/25/2023 [facility]    CT HEAD ANGIO W AND WO IV CONTRAST  1/25/2023    CT HEAD ANGIO W AND WO IV CONTRAST 1/25/2023 [facility]CTOR OFFICE [facility]    OTHER SURGICAL HISTORY  10/21/2020    Toe amputation      Family History   Problem Relation Name Age of Onset    Alzheimer's disease Mother      Diabetes Mother      Lung cancer Mother      Diabetes Father      Lung cancer Father      Pancreatic cancer Father      Diabetes Sister      Lung cancer Sister       Social History     Socioeconomic History    Marital status: Single     Spouse name: Not on file    Number of children: Not on file    Years of education: Not on file    Highest education level: Not on file   Occupational History    Not on file   Tobacco Use    Smoking status: Never     Passive exposure: Never    Smokeless tobacco: Never   Vaping Use    Vaping status: Never Used   Substance and Sexual Activity    Alcohol use: Yes    Drug use: Never    Sexual activity: Defer   Other Topics Concern    Not on file   Social History Narrative    Not on file     Social Drivers of Health     Financial Resource Strain: Low Risk  (1/2/2025)    Overall Financial Resource Strain (CARDIA)     Difficulty of Paying Living Expenses: Not hard at all   Food Insecurity: No Food Insecurity (1/2/2025)    Hunger Vital Sign     Worried About Running Out of Food in the Last Year: Never true     Ran Out of Food in the Last Year: Never true   Transportation Needs: No Transportation Needs (1/2/2025)    PRAPARE - Transportation     Lack of Transportation (Medical): No     Lack of Transportation (Non-Medical): No   Physical Activity: Insufficiently Active (1/2/2025)    Exercise Vital Sign     Days of Exercise per Week: 2 days     Minutes of Exercise per Session: 20 min   Stress: No Stress Concern Present (1/25/2024)    [place] [employer] [place] [employer] -  Occupational Stress Questionnaire     Feeling of Stress : Only a little   Social Connections: Feeling Socially Integrated (6/26/2024)    OASIS : Social Isolation     Frequency of experiencing loneliness or isolation: Never   Intimate Partner Violence: Not At Risk (1/2/2025)    Humiliation, Afraid, Rape, and Kick questionnaire     Fear of Current or Ex-Partner: No     Emotionally Abused: No     Physically Abused: No     Sexually Abused: No   Housing Stability: Low Risk  (1/2/2025)    Housing Stability Vital Sign     Unable to Pay for Housing in the Last Year: No     Number of Times Moved in the Last Year: 0     Homeless in the Last Year: No       No Known Allergies     Medications Prior to Admission   Medication Sig Dispense Refill Last Dose/Taking    acetaminophen (Tylenol) 325 mg tablet Take 2 tablets (650 mg) by mouth every 4 hours if needed for mild pain (1 - 3).   Past Month    albuterol 90 mcg/actuation inhaler Inhale 2 puffs every 6 hours if needed for wheezing. 6.7 g 0 Past Week    b complex vitamins capsule Take 1 capsule by mouth once daily.   1/1/2025    cloNIDine (Catapres) 0.1 mg tablet Take 1 tablet (0.1 mg) by mouth 2 times a day. 180 tablet 0 1/1/2025    folic acid (Folvite) 1 mg tablet Take 1 tablet (1 mg) by mouth once daily.   1/1/2025    gabapentin (Neurontin) 300 mg capsule Take 1 capsule (300 mg) by mouth 2 times a day. 180 capsule 0 1/1/2025    insulin glargine (Basaglar KwikPen U-100 Insulin) 100 unit/mL (3 mL) pen Inject 32 Units under the skin once daily in the morning. Take as directed per insulin instructions. 15 mL 1 12/31/2024    insulin lispro (Admelog SoloStar U-100 Insulin) 100 unit/mL injection Inject per sliding scale instructions under the skin 3 times a day with meals. (Max daily dose 70 units) 15 mL 1 12/31/2024    tirzepatide (Mounjaro) 2.5 mg/0.5 mL pen injector Inject 2.5 mg under the skin every 7 days. 2 mL 0 12/30/2024    [DISCONTINUED] amLODIPine (Norvasc) 10 mg tablet  "Take 1 tablet (10 mg) by mouth once daily. 90 tablet 0 1/1/2025    [DISCONTINUED] aspirin 81 mg chewable tablet Chew 1 tablet (81 mg) once daily. 90 tablet 0 1/1/2025 Noon    [DISCONTINUED] atorvastatin (Lipitor) 40 mg tablet Take 1 tablet (40 mg) by mouth once daily. 90 tablet 0 1/1/2025    blood-glucose sensor (FreeStyle [place] 3 Sensor) device Use to monitor blood glucose. Change sensor every 14 days. 2 each 11     FreeStyle Remington 3 [place] misc Use as instructed to monitor blood glucose. 1 each 0     furosemide (Lasix) 20 mg tablet Take 1 tablet (20 mg) by mouth once daily.       meclizine (Antivert) 25 mg tablet,chewable Chew 1 tablet (25 mg) 3 times a day as needed (vertigo). (Patient not taking: Reported on 1/2/2025) 90 tablet 1 Not Taking    OneTouch Delica Plus Lancet 30 gauge misc USE TO TEST BLOOD SUGAR AS DIRECTED 200 each 0     OneTouch Ultra Test strip Use 1 strip BID to check glucose 200 strip 1     OneTouch Ultra2 Meter misc use 1 to 2 times daily 1 each 0     pen needle, diabetic 32 gauge x 5/32\" needle Use four times a day with insulin use 360 each 1         Meds:   amLODIPine, 10 mg, Daily  aspirin, 81 mg, Daily  atorvastatin, 40 mg, Nightly  capsaicin, , TID  [Held by provider] empagliflozin, 10 mg, Daily  folic acid, 1 mg, Daily  gabapentin, 300 mg, BID  heparin (porcine), 7,500 Units, q8h NEHEMIAS  hydrALAZINE, 50 mg, TID  insulin glargine, 16 Units, Daily  insulin lispro, 0-10 Units, TID AC  isosorbide dinitrate, 10 mg, TID  polyethylene glycol, 17 g, Daily  sennosides-docusate sodium, 2 tablet, BID  torsemide, 60 mg, BID  vitamin B complex-vitamin C-folic acid, 1 capsule, Daily         acetaminophen, 650 mg, q6h PRN  albuterol, 2.5 mg, q6h PRN  bisacodyl, 10 mg, Daily PRN  dextrose, 12.5 g, q15 min PRN  dextrose, 25 g, q15 min PRN  glucagon, 1 mg, q15 min PRN  glucagon, 1 mg, q15 min PRN  oxygen, , Continuous PRN - O2/gases  oxygen, , Continuous PRN - O2/gases        Vitals:    01/12/25 1150   BP: " 101/65   Pulse: 78   Resp:    Temp: 35.6 °C (96.1 °F)   SpO2: 91%        01/10 1900 - 01/12 0659  In: 326.2 [P.O.:300; I.V.:26.2]  Out: 2300 [Urine:2300]   Weight change: -3.593 kg (-7 lb 14.8 oz)     General appearance: AAOx3. No distress  Eyes: non-icteric  Skin: no apparent rash  Heart: RRR  Lungs: CTA bilat.   Abdomen: soft, nt/nd  Extremities: trace edema bilat  Neuro: No FND      ASSESSMENT:  Nuris Squires is a 59 y.o. female presenting with PMH of poorly controlled insulin dependent type 2 diabetes c/b neuropathy, hyperlipidemia, PAD, CKD stage IV, hypertension, multiple foot infections s/p partial R great toe amputation, fall w/L femur fracture s/p ORIF (4/2024) that presents to the emergency department for SOB. Admitted under HHVI Service for further management of acute ADHF and HTN Urgency.     #VENKAT, non oliguric on proteinuric CKD IV (CKD secondary to likely DKD)  -Etiology: Normotensive ischemic injury with CRS.   -Volume status: mild hypervolemia  -Hemodynamics: -140s  -I/O: -1.7, 1.7L UOP    #CHFpEF - EF 70%    Recommendations:  - Would hold Amlodipine to maintain sBP > 120 ideally to maintain adequate renal perfusion  - Still mildly hypervolemic but volume status improving so agree with stopping lasix gtt and continue torsemide PO  - Avoid nephrotoxins, contrast if possible  - strict Is/Os  - No indication for dialysis   - Recommend nephrology outpatient follow up on discharge     Katia Guerra DO  Nephrology Fellow  24 hour Renal Pager - [phone]    Discussed with attending nephrologist

## 2025-01-12 NOTE — CARE PLAN
The patient's goals for the shift include rest    The clinical goals for the shift include Pt will diurese with net -1 liter this shift.    Over the shift, the patient did not make progress toward the following goals. Barriers to progression include patient admitted with SOB and fluid overload. Recommendations to address these barriers include diurese with IV lasix and Strict I's&O's..

## 2025-01-13 LAB
ALBUMIN SERPL BCP-MCNC: 3.6 G/DL (ref 3.4–5)
ANION GAP SERPL CALC-SCNC: 16 MMOL/L (ref 10–20)
BUN SERPL-MCNC: 89 MG/DL (ref 6–23)
CALCIUM SERPL-MCNC: 9.4 MG/DL (ref 8.6–10.6)
CHLORIDE SERPL-SCNC: 97 MMOL/L (ref 98–107)
CO2 SERPL-SCNC: 31 MMOL/L (ref 21–32)
CREAT SERPL-MCNC: 4.06 MG/DL (ref 0.5–1.05)
EGFRCR SERPLBLD CKD-EPI 2021: 12 ML/MIN/1.73M*2
ERYTHROCYTE [DISTWIDTH] IN BLOOD BY AUTOMATED COUNT: 14.1 % (ref 11.5–14.5)
GLUCOSE BLD MANUAL STRIP-MCNC: 114 MG/DL (ref 74–99)
GLUCOSE BLD MANUAL STRIP-MCNC: 132 MG/DL (ref 74–99)
GLUCOSE BLD MANUAL STRIP-MCNC: 209 MG/DL (ref 74–99)
GLUCOSE BLD MANUAL STRIP-MCNC: 236 MG/DL (ref 74–99)
GLUCOSE BLD MANUAL STRIP-MCNC: 241 MG/DL (ref 74–99)
GLUCOSE SERPL-MCNC: 201 MG/DL (ref 74–99)
HCT VFR BLD AUTO: 34 % (ref 36–46)
HGB BLD-MCNC: 10.4 G/DL (ref 12–16)
MAGNESIUM SERPL-MCNC: 2.71 MG/DL (ref 1.6–2.4)
MCH RBC QN AUTO: 29.6 PG (ref 26–34)
MCHC RBC AUTO-ENTMCNC: 30.6 G/DL (ref 32–36)
MCV RBC AUTO: 97 FL (ref 80–100)
NRBC BLD-RTO: 0 /100 WBCS (ref 0–0)
PHOSPHATE SERPL-MCNC: 6.5 MG/DL (ref 2.5–4.9)
PLATELET # BLD AUTO: 410 X10*3/UL (ref 150–450)
POTASSIUM SERPL-SCNC: 4.3 MMOL/L (ref 3.5–5.3)
RBC # BLD AUTO: 3.51 X10*6/UL (ref 4–5.2)
SODIUM SERPL-SCNC: 140 MMOL/L (ref 136–145)
WBC # BLD AUTO: 5.8 X10*3/UL (ref 4.4–11.3)

## 2025-01-13 PROCEDURE — 2500000001 HC RX 250 WO HCPCS SELF ADMINISTERED DRUGS (ALT 637 FOR MEDICARE OP): Performed by: NURSE PRACTITIONER

## 2025-01-13 PROCEDURE — 85027 COMPLETE CBC AUTOMATED: CPT | Performed by: NURSE PRACTITIONER

## 2025-01-13 PROCEDURE — 99233 SBSQ HOSP IP/OBS HIGH 50: CPT | Performed by: INTERNAL MEDICINE

## 2025-01-13 PROCEDURE — 2500000002 HC RX 250 W HCPCS SELF ADMINISTERED DRUGS (ALT 637 FOR MEDICARE OP, ALT 636 FOR OP/ED): Performed by: NURSE PRACTITIONER

## 2025-01-13 PROCEDURE — 36415 COLL VENOUS BLD VENIPUNCTURE: CPT | Performed by: NURSE PRACTITIONER

## 2025-01-13 PROCEDURE — 1200000002 HC GENERAL ROOM WITH TELEMETRY DAILY

## 2025-01-13 PROCEDURE — 2500000004 HC RX 250 GENERAL PHARMACY W/ HCPCS (ALT 636 FOR OP/ED): Performed by: NURSE PRACTITIONER

## 2025-01-13 PROCEDURE — 82947 ASSAY GLUCOSE BLOOD QUANT: CPT

## 2025-01-13 PROCEDURE — 2500000001 HC RX 250 WO HCPCS SELF ADMINISTERED DRUGS (ALT 637 FOR MEDICARE OP)

## 2025-01-13 PROCEDURE — 80069 RENAL FUNCTION PANEL: CPT | Performed by: NURSE PRACTITIONER

## 2025-01-13 PROCEDURE — 2500000001 HC RX 250 WO HCPCS SELF ADMINISTERED DRUGS (ALT 637 FOR MEDICARE OP): Performed by: STUDENT IN AN ORGANIZED HEALTH CARE EDUCATION/TRAINING PROGRAM

## 2025-01-13 PROCEDURE — 2500000002 HC RX 250 W HCPCS SELF ADMINISTERED DRUGS (ALT 637 FOR MEDICARE OP, ALT 636 FOR OP/ED)

## 2025-01-13 PROCEDURE — 83735 ASSAY OF MAGNESIUM: CPT | Performed by: NURSE PRACTITIONER

## 2025-01-13 RX ADMIN — ISOSORBIDE DINITRATE 10 MG: 10 TABLET ORAL at 14:11

## 2025-01-13 RX ADMIN — INSULIN LISPRO 4 UNITS: 100 INJECTION, SOLUTION INTRAVENOUS; SUBCUTANEOUS at 19:38

## 2025-01-13 RX ADMIN — HEPARIN SODIUM 7500 UNITS: 5000 INJECTION INTRAVENOUS; SUBCUTANEOUS at 21:04

## 2025-01-13 RX ADMIN — ISOSORBIDE DINITRATE 10 MG: 10 TABLET ORAL at 08:41

## 2025-01-13 RX ADMIN — GABAPENTIN 300 MG: 300 CAPSULE ORAL at 08:41

## 2025-01-13 RX ADMIN — ACETAMINOPHEN 650 MG: 325 TABLET ORAL at 08:41

## 2025-01-13 RX ADMIN — FOLIC ACID 1 MG: 1 TABLET ORAL at 08:41

## 2025-01-13 RX ADMIN — HYDRALAZINE HYDROCHLORIDE 50 MG: 50 TABLET ORAL at 20:19

## 2025-01-13 RX ADMIN — HYDRALAZINE HYDROCHLORIDE 50 MG: 50 TABLET ORAL at 14:11

## 2025-01-13 RX ADMIN — ATORVASTATIN CALCIUM 40 MG: 40 TABLET, FILM COATED ORAL at 20:19

## 2025-01-13 RX ADMIN — TORSEMIDE 60 MG: 20 TABLET ORAL at 16:06

## 2025-01-13 RX ADMIN — HYDRALAZINE HYDROCHLORIDE 50 MG: 50 TABLET ORAL at 08:41

## 2025-01-13 RX ADMIN — TORSEMIDE 60 MG: 20 TABLET ORAL at 09:33

## 2025-01-13 RX ADMIN — INSULIN GLARGINE 16 UNITS: 100 INJECTION, SOLUTION SUBCUTANEOUS at 08:41

## 2025-01-13 RX ADMIN — HEPARIN SODIUM 7500 UNITS: 5000 INJECTION INTRAVENOUS; SUBCUTANEOUS at 14:12

## 2025-01-13 RX ADMIN — HEPARIN SODIUM 7500 UNITS: 5000 INJECTION INTRAVENOUS; SUBCUTANEOUS at 06:34

## 2025-01-13 RX ADMIN — GABAPENTIN 300 MG: 300 CAPSULE ORAL at 20:19

## 2025-01-13 RX ADMIN — ASPIRIN 81 MG: 81 TABLET, CHEWABLE ORAL at 08:41

## 2025-01-13 RX ADMIN — NEPHROCAP 1 CAPSULE: 1 CAP ORAL at 08:41

## 2025-01-13 RX ADMIN — ISOSORBIDE DINITRATE 10 MG: 10 TABLET ORAL at 19:38

## 2025-01-13 ASSESSMENT — PAIN - FUNCTIONAL ASSESSMENT: PAIN_FUNCTIONAL_ASSESSMENT: 0-10

## 2025-01-13 ASSESSMENT — COGNITIVE AND FUNCTIONAL STATUS - GENERAL
CLIMB 3 TO 5 STEPS WITH RAILING: A LITTLE
MOBILITY SCORE: 22
DAILY ACTIVITIY SCORE: 24
WALKING IN HOSPITAL ROOM: A LITTLE

## 2025-01-13 ASSESSMENT — PAIN SCALES - GENERAL: PAINLEVEL_OUTOF10: 0 - NO PAIN

## 2025-01-13 ASSESSMENT — ACTIVITIES OF DAILY LIVING (ADL): LACK_OF_TRANSPORTATION: NO

## 2025-01-13 NOTE — PROGRESS NOTES
Subjective Data:   Patient denies CP/SOB at rest. Cr plateau; Weight down, overtly euvolemic on exam, on RA. Discussed CKD and anemia.    - cont. PO torsemide 60mg BID, discharge pending stabilizing Cr    Overnight Events:  No acute events overnight.     Objective Data:  Last Recorded Vitals:  Vitals:    01/13/25 0411 01/13/25 0646 01/13/25 0800 01/13/25 1056   BP: 99/63  130/84 116/58   BP Location:   Left arm Left arm   Patient Position: Lying  Lying Lying   Pulse: 73  73 73   Resp: 14  18 15   Temp: 37.3 °C (99.1 °F)  36 °C (96.8 °F) 36.1 °C (97 °F)   TempSrc:   Temporal Temporal   SpO2: 94%  97% 96%   Weight:  102 kg (225 lb 8.5 oz)     Height:         Last Labs:  CBC - 1/11/2025:  7:24 PM  6.5 9.9 383    30.0      CMP - 1/12/2025:  9:00 PM  9.0 6.9 14 --- 0.4   7.1 3.4 12 114      PTT - 4/15/2024:  9:28 PM  0.9   10.3 31     TROPHS   Date/Time Value Ref Range Status   01/02/2025 12:53 PM 21 0 - 34 ng/L Final   02/03/2024 01:22 AM 12 0 - 34 ng/L Final   01/24/2024 08:32 PM 25 0 - 34 ng/L Final   01/25/2023 04:58 PM 7 0 - 34 ng/L Final     Comment:     .  Less than 99th percentile of normal range cutoff-  Female and children under 18 years old <35 ng/L; Male <54 ng/L: Negative  Repeat testing should be performed if clinically indicated.   .  Female and children under 18 years old  ng/L; Male  ng/L:  Consistent with possible cardiac damage and possible increased clinical   risk. Serial measurements may help to assess extent of myocardial damage.   .  >120 ng/L: Consistent with cardiac damage, increased clinical risk and  myocardial infarction. Serial measurements may help assess extent of   myocardial damage.   .   NOTE: Children less than 1 year old may have higher baseline troponin   levels and results should be interpreted in conjunction with the overall   clinical context.  .  NOTE: Troponin I testing is performed using a different   testing methodology at [facility] than at other    system hospitals. Direct result comparisons should only   be made within the same method.     01/25/2023 04:16 PM 8 0 - 34 ng/L Final     Comment:     .  Less than 99th percentile of normal range cutoff-  Female and children under 18 years old <35 ng/L; Male <54 ng/L: Negative  Repeat testing should be performed if clinically indicated.   .  Female and children under 18 years old  ng/L; Male  ng/L:  Consistent with possible cardiac damage and possible increased clinical   risk. Serial measurements may help to assess extent of myocardial damage.   .  >120 ng/L: Consistent with cardiac damage, increased clinical risk and  myocardial infarction. Serial measurements may help assess extent of   myocardial damage.   .   NOTE: Children less than 1 year old may have higher baseline troponin   levels and results should be interpreted in conjunction with the overall   clinical context.  .  NOTE: Troponin I testing is performed using a different   testing methodology at [facility] than at [facility]. Direct result comparisons should only   be made within the same method.       BNP   Date/Time Value Ref Range Status   01/04/2025 09:19  0 - 99 pg/mL Final   01/02/2025 12:53  0 - 99 pg/mL Final     HGBA1C   Date/Time Value Ref Range Status   01/02/2025 06:35 PM 8.7 See comment % Final   11/19/2024 02:06 PM 9 4.2 - 6.5 % Final   07/19/2024 08:52 AM 11.2 4.2 - 6.5 % Final     VLDL   Date/Time Value Ref Range Status   05/08/2023 09:35 AM 38 0 - 40 mg/dL Final   04/12/2021 09:06 AM 21 0 - 40 mg/dL Final   10/20/2020 12:36 PM 24 0 - 40 mg/dL Final      Last I/O:  I/O last 3 completed shifts:  In: 240 (2.3 mL/kg) [P.O.:240]  Out: 1700 (16.6 mL/kg) [Urine:1700 (0.5 mL/kg/hr)]  Weight: 102.3 kg     Past Cardiology Tests (Last 3 Years):    Echo:  Transthoracic Echo (TTE) Complete 01/25/2024     PHYSICIAN INTERPRETATION:  Left Ventricle: The left ventricular systolic function is  hyperdynamic, with an estimated ejection fraction of 70-75%. There are no regional wall motion abnormalities. The left ventricular cavity size is normal. The left ventricular septal wall thickness is mildly increased. There is mild concentric left ventricular hypertrophy. Spectral Doppler shows a normal pattern of left ventricular diastolic filling.  Left Atrium: The left atrium is mildly dilated.  Right Ventricle: The right ventricle was not well visualized. There is normal right ventricular global systolic function.  Right Atrium: The right atrium is normal in size.  Aortic Valve: The aortic valve is trileaflet. There is no evidence of aortic valve regurgitation. The peak instantaneous gradient of the aortic valve is 10.0 mmHg. The mean gradient of the aortic valve is 5.0 mmHg.  Mitral Valve: The mitral valve is normal in structure. There is trace to mild mitral valve regurgitation.  Tricuspid Valve: The tricuspid valve is structurally normal. There is trace to mild tricuspid regurgitation. The Doppler estimated RVSP is within normal limits at 32.4 mmHg.  Pulmonic Valve: The pulmonic valve is structurally normal. There is physiologic pulmonic valve regurgitation.  Pericardium: There is a trivial pericardial effusion.  Pleural: There is left pleural effusion.  Aorta: The aortic root is normal.    CONCLUSIONS:  1. Left ventricular systolic function is hyperdynamic with a 70-75% estimated ejection fraction.  2. RVSP within normal limits.    ** Final **    Transthoracic Echo (TTE) Complete 01/3/2025  CONCLUSIONS:   1. Left ventricular ejection fraction is normal, calculated by Argueta's biplane at 69%.   2. Spectral Doppler shows a Grade II (pseudonormal pattern) of left ventricular diastolic filling with an elevated left atrial pressure.   3. There is normal right ventricular global systolic function.   4. The left atrium is moderately dilated.   5. Moderate mitral valve regurgitation.   6. Mechanism of MR Is  possibly restricted motion of the posterior leaflet.   7. Moderate tricuspid regurgitation visualized.   8. Moderately elevated right ventricular systolic pressure.   9. Small pericardial effusion.  10. Compared with study dated 1/25/2024, the degree of MR appears moderate today rather than mild on prior study. TR is also worsened.    Inpatient Medications:  Scheduled medications   Medication Dose Route Frequency    [Held by provider] amLODIPine  10 mg oral Daily    aspirin  81 mg oral Daily    atorvastatin  40 mg oral Nightly    capsaicin   Topical TID    [Held by provider] empagliflozin  10 mg oral Daily    folic acid  1 mg oral Daily    gabapentin  300 mg oral BID    heparin (porcine)  7,500 Units subcutaneous q8h NEHEMIAS    hydrALAZINE  50 mg oral TID    insulin glargine  16 Units subcutaneous Daily    insulin lispro  0-10 Units subcutaneous TID AC    iron sucrose  200 mg intravenous Daily    isosorbide dinitrate  10 mg oral TID    polyethylene glycol  17 g oral Daily    sennosides-docusate sodium  2 tablet oral BID    torsemide  60 mg oral BID    vitamin B complex-vitamin C-folic acid  1 capsule oral Daily     PRN medications   Medication    acetaminophen    albuterol    bisacodyl    dextrose    dextrose    glucagon    glucagon    oxygen    oxygen     Continuous Medications   Medication Dose Last Rate     Physical Exam:  General: NAD, lying in bed  Skin: warm and dry throughout  Head/ neck: unable to appreciate JVD  Cardiac: RRR, S1S2S3  Pulm: diminished in BLL, on RA  GI: soft, nontender, non-distended, +BS  Extremities: trace non-pitting edema in BLE  Neuro: no focal neuro deficits, A&Ox4  Psych: appropriate mood and behavior, very pleasant       Assessment/Plan   Nuris Squires is a 59 y.o. female presenting with PMH of poorly controlled insulin dependent type 2 diabetes c/b neuropathy, hyperlipidemia, PAD, CKD stage IV, hypertension, multiple foot infections s/p partial R great toe amputation, fall w/L femur  fracture s/p ORIF (4/2024) that presents to the emergency department today for shortness of breath x1 day. Admitted under HHVI Service for further management of acute ADHF and HTN Urgency.     Acute diastolic heart failure  Chest tightness, resolved  - likely 2/2 uncontrolled HTN, c/f high-output HF 2/2 obesity  - TTE (1/2024): EF 70-75%  - TTE 1/3/2025: EF 69%, grade II diastolic filling, moderate MR, moderate TR, small pericardial effusion  - neg stress 10/2019  - CXR: pulm vascular congestion BL, small bibasilar pleural effusions  - Last BNP (1/5) 161 (376 on admit iso obesity)  - HS trop 21, no ischemic changes on ECG, no c/f ACS  - dry weight ~100kg  - admit weight: 107kg  - daily weight: 102kg (104, 108, 108, 107, 106 kg)  - RHC (1/8): reviewed tracings with Dr. Mckee, numbers are questionable but RVEDP 20, PA 75/25, unclear wedge  - of note, pharmacy verified patient on PO lasix 20mg daily at home (?for unclear reasons, presumably prescribed by PCP for leg swelling)-> per patient, was just started on it at home  - s/p successful diuresis w/IV lasix gtt at 15mg/hr and PRN metolazone  - cont. PO torsemide 60mg BID, ?may need PRN metolazone at d/c   - do NOT start empa iso GFR <20  - avoid ACEI/ARB/ARNI, MRA for now d/t rising Cr  - cont. Hydral 50mg TID, isordil 10mg TID  - HF navigator following, establishing care here  - Daily standing weights, strict I&O's, 2g sodium diet, 2L fluid restriction     CKD IV, worsening  - Cr last year 1.7-5.3, worsening since 2022  - admit Cr 2.34  - daily Cr 4.06 (3.89, 3.85, 3.61, 3.73)  - urine lytes, osmolality reviewed, FENa 5.6% suggesting intrinsic cause  - renal consult (1/12): HOLD amlodipine for goal SBP >120, agree w/diuretic plan, further recs for today pending  - diuresis plan as above    Anemia of unclear origin  Paraesthesias, acute  - c/o paraesthesias around mouth & fingertips, new since admit  - unremarkable colonoscopy in 2019, rec'd to repeat in 7yrs for  screening  - Baseline Hgb ~9-11  - Hgb today: 9.9 (8.9, 9.6, 11.1 on admit)  - irons studies: sat 33%, folate/ferritin/vit. B12 WNL--->cont. IV iron 200mg x5 days, transition to oral after  - no overt signs of bleeding  - capsaicin cream for hands  - cont. OP follow-up w/PCP (scheduled 2/20)    Acute hypoxic respiratory failure iso of ADHF exacerbation, improving  Pulmonary HTN  - p.ox initially at 61% but improved rapidly with placement of nasal cannula  - She was requiring 6 L of oxygen, BiPAP briefly due to the concern for SCAPE, received SL nitro and she was able to wean off of BiPAP  -  weaned from 2L O2 to RA, however, desats at night, c/f RENZO (see below)  - VQ scan (1/9; for pulm HTN given high PA on RHC): low probability of PE  - Flu A/B/RSV/COVID negative  - diuresis plan as above  - pulmonary toileting w/incentive spirometry     HTN urgency, resolved  - BP on on admit: 220/98  - SBP improved to 143 after resumption of home meds and s/p IVP Hydral 5mg x1  - last 24 hour SBP range: low 100-140s  - BPs labile, clonidine weaned off on 1/5, intermittent headaches w/isordil (tolerating 10mg TID)  - cont. Meds as above, HOLD home amlodipine 10mg daily per renals recs as above    Obesity  Insulin dependent Type 2 diabetes, Poorly controlled  Neuropathy  - Follows with clinical pharmacy closely/PCP  - Hgb A1c 8.7% (improved from previous 11.9%)  - BMI: 41.94  - home regimen: Mounjaro 2.5mg weekly, basaglar 32 units every morning, admelog SS with meals  - c/w lantus 16 units every morning (decreased from 32U iso AM BG of 62) and SSI #2 while inpatient  - OARRS reviewed, cont. Home gabapentin 300mg BID      HLD  PAD  - c/w asa 81mg daily, atorva 40mg daily     C/f RENZO  - reported increased daytime fatigue  - desats on RA in-house while sleeping  - has sleep medicine referral placed    Constipation  - cont. Bowel regimen  - encourage frequent ambulation       DVT ppx: subQ heparin  Emergency contacts: Boyfrienterrie Brandon  Soham #[phone]  Brother Luis Squires #[phone]  DISPO: previously received HHC w/[facility]; likely resume at discharge   - HF navigator following, established care here  - discharge home ?tomorrow, pending stabilization of Cr    All labs, vital signs, tests & imaging results, and medications were reviewed.     Patient seen and discussed with Dr. Pizarro    Code Status:  Full Code    Betty Martin, APRN-CNP

## 2025-01-13 NOTE — NURSING NOTE
Ms. Squires is resting in bed.  We reviewed carb managed diet/meal plan.  Needs reminders re which meds are carbs but knows this a little better now than last week.  She is able to state portion control and having fewer carbs and more protein on plate.  She does not eat veggies typically but states she will try. States she finds the meal planning handout to be helpful.  Will sign off at this time -- she is to be discharged this week per  notes.  Time spent:  15 mins.

## 2025-01-13 NOTE — PROGRESS NOTES
HVI Attending Shared Visit Note    This is a shared visit. Please see Advanced Practice Provider's encounter note for additional details.        Overnight events/Subjective: Feels ok, hasn't had a colonoscopy recently    Exam:   Physical exam: Well appearing, no distress. Normal rate, regular rhythm, non labored breathing, clear to auscultation, abdomen non distended, no LE edema, no focal neuro deficits.     A/P:   59 F with IDDM with complications, HLD, PAD, CKD, HTN, multple prior foot infections who presented with dyspnea.  Being treated for HFpEF exacerbation in the setting of hypertension.  #HFpEF RA 20, RVSP 72, PA 72/24 (40), W: poor waveform, ?24. CO 10.4/5.1: Transitioned to. torsemide.  Holding empagliflozin and other GDMT due to VENKAT  #VENKAT: Cr at 4.0, potentially from overdiuresis/hypotension. Appears to be stabilizing.   #BP controlled on current regimen of isordil and hydralazine  #Anemia: consider outpt colonoscopy    Dispo: Follow up with Dr. Sparks on 1/17.     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 CKD stage IV    Stage 4 chronic kidney disease (Multi)    Morbid obesity with body mass index (BMI) of 40.0 or higher (Multi)    Diabetic autonomic neuropathy associated with type 2 diabetes mellitus (Multi)    Acute on chronic diastolic (congestive) heart failure      Objective   Admit Date: 1/2/2025  Hospital Length of Stay: 11   Home: [facility] [id number]    Vitals:      1/13/2025    10:56 AM 1/13/2025     8:00 AM 1/13/2025     6:46 AM 1/13/2025     4:11 AM 1/12/2025     8:17 PM 1/12/2025    11:50 AM 1/12/2025     7:49 AM   Vitals   Systolic 116 130  99 103 101 137   Diastolic 58 84  63 66 65 66   BP Location Left arm Left arm     Left arm   Heart Rate 73 73  73 80 78 78    Temp 36.1 °C (97 °F) 36 °C (96.8 °F)  37.3 °C (99.1 °F) 36.3 °C (97.3 °F) 35.6 °C (96.1 °F) 35.8 °C (96.4 °F)   Resp 15 18  14 18  19   Weight (lb)   225.53       BMI   41.25 kg/m2       BSA (m2)   2.11 m2         Wt Readings from Last 5 Encounters:   01/13/25 102 kg (225 lb 8.5 oz)   11/19/24 99.8 kg (220 lb)   11/15/24 99.8 kg (220 lb 0.3 oz)   09/05/24 99.8 kg (220 lb)   09/04/24 102 kg (224 lb)       Intake/Output Summary (Last 24 hours) at 1/13/2025 1339  Last data filed at 1/13/2025 0978  Gross per 24 hour   Intake --   Output 1200 ml   Net -1200 ml         MEDICATIONS  Infusions:     Scheduled:  [Held by provider] amLODIPine, 10 mg, Daily  aspirin, 81 mg, Daily  atorvastatin, 40 mg, Nightly  capsaicin, , TID  folic acid, 1 mg, Daily  gabapentin, 300 mg, BID  heparin (porcine), 7,500 Units, q8h NEHEMIAS  hydrALAZINE, 50 mg, TID  insulin glargine, 16 Units, Daily  insulin lispro, 0-10 Units, TID AC  iron sucrose, 200 mg, Daily  isosorbide dinitrate, 10 mg, TID  polyethylene glycol, 17 g, Daily  sennosides-docusate sodium, 2 tablet, BID  torsemide, 60 mg, BID  vitamin B complex-vitamin C-folic acid, 1 capsule, Daily      PRN:  acetaminophen, 650 mg, q6h PRN  albuterol, 2.5 mg, q6h PRN  bisacodyl, 10 mg, Daily PRN  dextrose, 12.5 g, q15 min PRN  dextrose, 25 g, q15 min PRN  glucagon, 1 mg, q15 min PRN  glucagon, 1 mg, q15 min PRN  oxygen, , Continuous PRN - O2/gases  oxygen, , Continuous PRN - O2/gases      Prior to Admission Meds:  Medications Prior to Admission   Medication Sig Dispense Refill Last Dose/Taking    acetaminophen (Tylenol) 325 mg tablet Take 2 tablets (650 mg) by mouth every 4 hours if needed for mild pain (1 - 3).   Past Month    albuterol 90 mcg/actuation inhaler Inhale 2 puffs every 6 hours if needed for wheezing. 6.7 g 0 Past Week    b complex vitamins capsule Take 1 capsule by mouth once daily.   1/1/2025    cloNIDine (Catapres) 0.1 mg tablet Take 1 tablet (0.1 mg) by mouth 2 times a day. 180  "tablet 0 1/1/2025    folic acid (Folvite) 1 mg tablet Take 1 tablet (1 mg) by mouth once daily.   1/1/2025    gabapentin (Neurontin) 300 mg capsule Take 1 capsule (300 mg) by mouth 2 times a day. 180 capsule 0 1/1/2025    insulin glargine (Basaglar KwikPen U-100 Insulin) 100 unit/mL (3 mL) pen Inject 32 Units under the skin once daily in the morning. Take as directed per insulin instructions. 15 mL 1 12/31/2024    insulin lispro (Admelog SoloStar U-100 Insulin) 100 unit/mL injection Inject per sliding scale instructions under the skin 3 times a day with meals. (Max daily dose 70 units) 15 mL 1 12/31/2024    tirzepatide (Mounjaro) 2.5 mg/0.5 mL pen injector Inject 2.5 mg under the skin every 7 days. 2 mL 0 12/30/2024    [DISCONTINUED] amLODIPine (Norvasc) 10 mg tablet Take 1 tablet (10 mg) by mouth once daily. 90 tablet 0 1/1/2025    [DISCONTINUED] aspirin 81 mg chewable tablet Chew 1 tablet (81 mg) once daily. 90 tablet 0 1/1/2025 Noon    [DISCONTINUED] atorvastatin (Lipitor) 40 mg tablet Take 1 tablet (40 mg) by mouth once daily. 90 tablet 0 1/1/2025    blood-glucose sensor (FreeStyle Remington 3 Sensor) device Use to monitor blood glucose. Change sensor every 14 days. 2 each 11     FreeStyle Remington 3 [place] misc Use as instructed to monitor blood glucose. 1 each 0     furosemide (Lasix) 20 mg tablet Take 1 tablet (20 mg) by mouth once daily.       meclizine (Antivert) 25 mg tablet,chewable Chew 1 tablet (25 mg) 3 times a day as needed (vertigo). (Patient not taking: Reported on 1/2/2025) 90 tablet 1 Not Taking    OneTouch Delica Plus Lancet 30 gauge misc USE TO TEST BLOOD SUGAR AS DIRECTED 200 each 0     OneTouch Ultra Test strip Use 1 strip BID to check glucose 200 strip 1     OneTouch Ultra2 Meter misc use 1 to 2 times daily 1 each 0     pen needle, diabetic 32 gauge x 5/32\" needle Use four times a day with insulin use 360 each 1        DATA:  CMP:  Recent Labs     01/12/25  2100 01/11/25  1924 01/10/25  2027 " 01/09/25 1913 01/08/25  1936 01/07/25  1930 01/06/25 2039 01/05/25 2008    138 138 138 140 139 141 135*   K 5.0 4.6 4.7 4.8 4.2 4.2 4.5 4.6   CL 96* 99 101 102 104 103 104 102   CO2 25 27 25 24 26 25 23 24   ANIONGAP 21* 17 17 17 14 15 19 14   BUN 88* 73* 69* 62* 60* 57* 52* 46*   CREATININE 4.06* 3.89* 3.85* 3.61* 3.73* 3.82* 3.75* 2.93*   EGFR 12* 13* 13* 14* 13* 13* 13* 18*   MG 2.60* 2.47* 2.36 2.40 2.32 2.27 2.10 2.50*     Recent Labs     01/12/25  2100 01/11/25  1924 01/10/25  2027 01/09/25  1913 01/08/25  1936 01/07/25  1930 01/06/25 2039 01/05/25 2008 01/02/25  1835 01/02/25  1253 09/06/24  0120 04/17/24  0901 04/15/24  2128 02/07/24  0422 02/06/24  0735 02/04/24  0555 02/03/24  0122 01/26/24  1343 01/26/24  0358 01/25/24  1457 01/24/24  2032 01/23/24  1230 01/12/24  0603 01/11/24  0747 01/09/24  1210 08/13/23  1712 07/04/19  0707 04/02/19  1106 02/17/19  1207   ALBUMIN 3.4 3.5 3.3* 3.7 3.2* 3.6 3.4 3.1*   < > 3.6 3.3*   < > 3.5   < > 2.9*   < > 3.4   < > 2.7*   < > 3.4 4.1   < > 2.5*   < > 3.6   < > 3.9 3.8  3.8   ALT  --   --   --   --   --   --   --   --   --  12 10  --  23  --   --   --  22  --  19  --  25 16  --  6   < > 11   < > 12 10   AST  --   --   --   --   --   --   --   --   --  14 11  --  22  --   --   --  21  --  15  --  28 17  --  8   < > 11   < > 13 12   BILITOT  --   --   --   --   --   --   --   --   --  0.4 0.4  --  0.4  --   --   --  0.4  --  0.2  --  0.4 0.3  --  <0.2   < > 0.4   < > 0.5 0.4   LIPASE  --   --   --   --   --   --   --   --   --   --   --   --   --   --  5*  --   --   --   --   --   --   --   --   --   --  <3*  --  7* 3*    < > = values in this interval not displayed.     CBC:  Recent Labs     01/11/25  1924 01/10/25  2027 01/09/25  1913 01/08/25  1936 01/07/25  1930 01/06/25  2039 01/05/25  2008 01/04/25  2119   WBC 6.5 6.6 5.5 6.7 5.9 6.5 6.5 6.9   HGB 9.9* 8.9* 9.6* 9.4* 9.9* 9.9* 9.3* 9.8*   HCT 30.0* 29.1* 30.7* 30.5* 32.1* 30.8* 27.9* 31.6*    330  324 326 330 335 298 329   MCV 90 98 95 96 96 93 89 93     COAG:   Recent Labs     04/15/24  2128 01/29/24  1217 01/24/24  2032 10/21/19  1709 04/02/19  1106 02/17/19  1207   INR 0.9 1.1 1.2* 1.0 1.0 1.1     ABO:   Recent Labs     04/17/24  0901   ABO A     HEME/ENDO:   Recent Labs     01/11/25  1924 01/02/25  1835 11/19/24  1406 07/19/24  0852 04/16/24  0637 02/03/24  0122 01/26/24  0358 01/24/24 2032 01/18/24  0525 01/15/24  0606 09/14/23  1047 06/14/23  1016 05/08/23  1320 01/27/23  0840 01/26/23  1613   FERRITIN 62  --   --   --   --   --   --   --  276* 487*  --   --   --   --   --    IRONSAT 33  --   --   --   --   --   --   --  34 32  --  25  --   --   --    TSH  --   --   --   --   --  0.46 1.37  --   --   --   --  0.83  --   --  0.27*   FREET4  --   --   --   --   --   --   --   --   --   --   --   --   --  1.16 1.14   HGBA1C  --  8.7* 9* 11.2* 11.9*  --   --    < >  --   --    < >  --    < >  --   --     < > = values in this interval not displayed.     CARDIAC:   Recent Labs     01/04/25 2119 01/02/25  1253 02/03/24  0122 01/24/24 2032 01/25/23  1658 01/25/23  1616   TROPHS  --  21 12 25 7 8   * 376* 185* 311* 24  --      Recent Labs     05/08/23  0935 04/12/21  0906 10/20/20  1236   CHOL 174 161 181   LDLF 98 101* 115*   HDL 38.5* 38.9* 41.5   TRIG 189* 104 121     TOX:  Recent Labs     02/03/24  0800   AMPHETAMINE Presumptive Negative   BENZO Presumptive Negative   CANNABINOID Presumptive Negative   COCAI Presumptive Negative   FENTANYL Presumptive Negative   OPIATE Presumptive Negative   OXYCODONE Presumptive Negative   PCP Presumptive Negative     MICRO:   Recent Labs     02/05/24  0446 02/03/24  0953 02/03/24  0122 01/29/24  1217 01/25/24  0819 01/23/24  1230   CRP 1.29*  --  1.86*  --   --  0.21   PROCAL  --  0.05  --  0.05 0.14*  --      No results found for the last 90 days.      FOLLOWUP:   Future Appointments   Date Time Provider Department Center   1/14/2025  2:20 PM Amanda WEI  Heath PharmD [id number] [facility]   1/17/2025 10:00 AM Marylin Sparks MD MPH [id number] [facility]   1/20/2025 11:30 AM Marcin Motta MD [id number] [facility]   2/5/2025  9:45 AM Barrera Rodriguez MD [id number] [facility]   2/20/2025 10:30 AM Mone Aquino MD [id number] [facility]

## 2025-01-13 NOTE — PROGRESS NOTES
01/13/25 0822   Discharge Planning   Living Arrangements Spouse/significant other   Support Systems Spouse/significant other   Assistance Needed none   Type of Residence Private residence   Number of Stairs to Enter Residence 5   Number of Stairs Within Residence 0   Do you have animals or pets at home? No   Type of Animals or Pets dog   Who is requesting discharge planning? Provider   Home or Post Acute Services None   Expected Discharge Disposition Home   Does the patient need discharge transport arranged? No   Financial Resource Strain   How hard is it for you to pay for the very basics like food, housing, medical care, and heating? Not hard   Housing Stability   In the last 12 months, was there a time when you were not able to pay the mortgage or rent on time? N   In the past 12 months, how many times have you moved where you were living? 1   At any time in the past 12 months, were you homeless or living in a shelter (including now)? N   Transportation Needs   In the past 12 months, has lack of transportation kept you from medical appointments or from getting medications? no   In the past 12 months, has lack of transportation kept you from meetings, work, or from getting things needed for daily living? No   Patient Choice   Provider Choice list and CMS website (https://medicare.gov/care-compare#search) for post-acute Quality and Resource Measure Data were provided and reviewed with: Patient   Patient / Family choosing to utilize agency / facility established prior to hospitalization No   Stroke Family Assessment   Stroke Family Assessment Needed No     Transitional Care Coordination Progress Note:  Patient discussed during interdisciplinary rounds.   Team members present: LORENA MAX MD   Plan per Medical/Surgical team: Monitoring creatine renal consult oral diuretics   Payor: [employer]   Discharge disposition: Home   Potential Barriers: None   ADOD: 1-      Previous Home Care: None   DME:  [employer] wheelchair   Pharmacy: [facility]   Falls: April 2024   PCP:  ROGER URBINA   Dialysis: None

## 2025-01-13 NOTE — CARE PLAN
Problem: Safety - Adult  Goal: Free from fall injury  Outcome: Progressing     Problem: Discharge Planning  Goal: Discharge to home or other facility with appropriate resources  Outcome: Progressing     Problem: Chronic Conditions and Co-morbidities  Goal: Patient's chronic conditions and co-morbidity symptoms are monitored and maintained or improved  Outcome: Progressing   The patient's goals for the shift include rest    The clinical goals for the shift include Pt will remain free of falls

## 2025-01-14 ENCOUNTER — APPOINTMENT (OUTPATIENT)
Dept: PHARMACY | Facility: HOSPITAL | Age: 60
End: 2025-01-14
Payer: COMMERCIAL

## 2025-01-14 LAB
ALBUMIN SERPL BCP-MCNC: 3.5 G/DL (ref 3.4–5)
ANION GAP SERPL CALC-SCNC: 17 MMOL/L (ref 10–20)
BUN SERPL-MCNC: 90 MG/DL (ref 6–23)
CALCIUM SERPL-MCNC: 9 MG/DL (ref 8.6–10.6)
CHLORIDE SERPL-SCNC: 98 MMOL/L (ref 98–107)
CO2 SERPL-SCNC: 29 MMOL/L (ref 21–32)
CREAT SERPL-MCNC: 4.28 MG/DL (ref 0.5–1.05)
EGFRCR SERPLBLD CKD-EPI 2021: 11 ML/MIN/1.73M*2
ERYTHROCYTE [DISTWIDTH] IN BLOOD BY AUTOMATED COUNT: 14.1 % (ref 11.5–14.5)
GLUCOSE BLD MANUAL STRIP-MCNC: 185 MG/DL (ref 74–99)
GLUCOSE BLD MANUAL STRIP-MCNC: 237 MG/DL (ref 74–99)
GLUCOSE BLD MANUAL STRIP-MCNC: 265 MG/DL (ref 74–99)
GLUCOSE BLD MANUAL STRIP-MCNC: 271 MG/DL (ref 74–99)
GLUCOSE SERPL-MCNC: 141 MG/DL (ref 74–99)
HCT VFR BLD AUTO: 30.3 % (ref 36–46)
HGB BLD-MCNC: 9.7 G/DL (ref 12–16)
MAGNESIUM SERPL-MCNC: 2.64 MG/DL (ref 1.6–2.4)
MCH RBC QN AUTO: 30.7 PG (ref 26–34)
MCHC RBC AUTO-ENTMCNC: 32 G/DL (ref 32–36)
MCV RBC AUTO: 96 FL (ref 80–100)
NRBC BLD-RTO: 0 /100 WBCS (ref 0–0)
PHOSPHATE SERPL-MCNC: 5.8 MG/DL (ref 2.5–4.9)
PLATELET # BLD AUTO: 415 X10*3/UL (ref 150–450)
POTASSIUM SERPL-SCNC: 4.4 MMOL/L (ref 3.5–5.3)
RBC # BLD AUTO: 3.16 X10*6/UL (ref 4–5.2)
SODIUM SERPL-SCNC: 140 MMOL/L (ref 136–145)
WBC # BLD AUTO: 6.4 X10*3/UL (ref 4.4–11.3)

## 2025-01-14 PROCEDURE — 2500000001 HC RX 250 WO HCPCS SELF ADMINISTERED DRUGS (ALT 637 FOR MEDICARE OP): Performed by: STUDENT IN AN ORGANIZED HEALTH CARE EDUCATION/TRAINING PROGRAM

## 2025-01-14 PROCEDURE — 83735 ASSAY OF MAGNESIUM: CPT | Performed by: NURSE PRACTITIONER

## 2025-01-14 PROCEDURE — 1200000002 HC GENERAL ROOM WITH TELEMETRY DAILY

## 2025-01-14 PROCEDURE — 80069 RENAL FUNCTION PANEL: CPT | Performed by: NURSE PRACTITIONER

## 2025-01-14 PROCEDURE — 85027 COMPLETE CBC AUTOMATED: CPT | Performed by: NURSE PRACTITIONER

## 2025-01-14 PROCEDURE — 2500000004 HC RX 250 GENERAL PHARMACY W/ HCPCS (ALT 636 FOR OP/ED): Performed by: NURSE PRACTITIONER

## 2025-01-14 PROCEDURE — 2500000001 HC RX 250 WO HCPCS SELF ADMINISTERED DRUGS (ALT 637 FOR MEDICARE OP): Performed by: NURSE PRACTITIONER

## 2025-01-14 PROCEDURE — 2500000004 HC RX 250 GENERAL PHARMACY W/ HCPCS (ALT 636 FOR OP/ED)

## 2025-01-14 PROCEDURE — 99232 SBSQ HOSP IP/OBS MODERATE 35: CPT

## 2025-01-14 PROCEDURE — 82947 ASSAY GLUCOSE BLOOD QUANT: CPT

## 2025-01-14 PROCEDURE — 2500000002 HC RX 250 W HCPCS SELF ADMINISTERED DRUGS (ALT 637 FOR MEDICARE OP, ALT 636 FOR OP/ED)

## 2025-01-14 PROCEDURE — 2500000002 HC RX 250 W HCPCS SELF ADMINISTERED DRUGS (ALT 637 FOR MEDICARE OP, ALT 636 FOR OP/ED): Performed by: NURSE PRACTITIONER

## 2025-01-14 PROCEDURE — 2500000001 HC RX 250 WO HCPCS SELF ADMINISTERED DRUGS (ALT 637 FOR MEDICARE OP)

## 2025-01-14 PROCEDURE — 99233 SBSQ HOSP IP/OBS HIGH 50: CPT | Performed by: INTERNAL MEDICINE

## 2025-01-14 RX ORDER — INSULIN GLARGINE 100 [IU]/ML
18 INJECTION, SOLUTION SUBCUTANEOUS DAILY
Status: DISCONTINUED | OUTPATIENT
Start: 2025-01-15 | End: 2025-01-17

## 2025-01-14 RX ADMIN — TORSEMIDE 60 MG: 20 TABLET ORAL at 08:37

## 2025-01-14 RX ADMIN — GABAPENTIN 300 MG: 300 CAPSULE ORAL at 08:41

## 2025-01-14 RX ADMIN — ISOSORBIDE DINITRATE 10 MG: 10 TABLET ORAL at 20:58

## 2025-01-14 RX ADMIN — FOLIC ACID 1 MG: 1 TABLET ORAL at 08:41

## 2025-01-14 RX ADMIN — INSULIN GLARGINE 16 UNITS: 100 INJECTION, SOLUTION SUBCUTANEOUS at 08:41

## 2025-01-14 RX ADMIN — HEPARIN SODIUM 7500 UNITS: 5000 INJECTION INTRAVENOUS; SUBCUTANEOUS at 06:30

## 2025-01-14 RX ADMIN — IRON SUCROSE 200 MG: 20 INJECTION, SOLUTION INTRAVENOUS at 06:30

## 2025-01-14 RX ADMIN — INSULIN LISPRO 2 UNITS: 100 INJECTION, SOLUTION INTRAVENOUS; SUBCUTANEOUS at 08:42

## 2025-01-14 RX ADMIN — AMLODIPINE BESYLATE 10 MG: 10 TABLET ORAL at 09:00

## 2025-01-14 RX ADMIN — ATORVASTATIN CALCIUM 40 MG: 40 TABLET, FILM COATED ORAL at 20:58

## 2025-01-14 RX ADMIN — GABAPENTIN 300 MG: 300 CAPSULE ORAL at 20:58

## 2025-01-14 RX ADMIN — HYDRALAZINE HYDROCHLORIDE 50 MG: 50 TABLET ORAL at 20:59

## 2025-01-14 RX ADMIN — HEPARIN SODIUM 7500 UNITS: 5000 INJECTION INTRAVENOUS; SUBCUTANEOUS at 21:07

## 2025-01-14 RX ADMIN — ISOSORBIDE DINITRATE 10 MG: 10 TABLET ORAL at 08:41

## 2025-01-14 RX ADMIN — HEPARIN SODIUM 7500 UNITS: 5000 INJECTION INTRAVENOUS; SUBCUTANEOUS at 14:21

## 2025-01-14 RX ADMIN — HYDRALAZINE HYDROCHLORIDE 50 MG: 50 TABLET ORAL at 08:41

## 2025-01-14 RX ADMIN — ASPIRIN 81 MG: 81 TABLET, CHEWABLE ORAL at 08:53

## 2025-01-14 RX ADMIN — SODIUM CHLORIDE 500 ML: 9 INJECTION, SOLUTION INTRAVENOUS at 21:07

## 2025-01-14 RX ADMIN — ISOSORBIDE DINITRATE 10 MG: 10 TABLET ORAL at 14:21

## 2025-01-14 RX ADMIN — INSULIN LISPRO 4 UNITS: 100 INJECTION, SOLUTION INTRAVENOUS; SUBCUTANEOUS at 15:56

## 2025-01-14 RX ADMIN — HYDRALAZINE HYDROCHLORIDE 50 MG: 50 TABLET ORAL at 14:21

## 2025-01-14 RX ADMIN — NEPHROCAP 1 CAPSULE: 1 CAP ORAL at 08:40

## 2025-01-14 ASSESSMENT — COGNITIVE AND FUNCTIONAL STATUS - GENERAL
MOBILITY SCORE: 22
DAILY ACTIVITIY SCORE: 24
CLIMB 3 TO 5 STEPS WITH RAILING: A LITTLE
WALKING IN HOSPITAL ROOM: A LITTLE

## 2025-01-14 ASSESSMENT — PAIN SCALES - GENERAL: PAINLEVEL_OUTOF10: 0 - NO PAIN

## 2025-01-14 ASSESSMENT — PAIN - FUNCTIONAL ASSESSMENT: PAIN_FUNCTIONAL_ASSESSMENT: 0-10

## 2025-01-14 NOTE — PROGRESS NOTES
HVI Attending Shared Visit Note    This is a shared visit. Please see Advanced Practice Provider's encounter note for additional details.        Overnight events/Subjective: Feels better, creatinine stable. Few beat run of NSVT overnight    Exam:   Physical exam: Well appearing, no distress. Normal rate, regular rhythm, non labored breathing, clear to auscultation, abdomen non distended, no LE edema, no focal neuro deficits.     A/P:   59 F with IDDM with complications, HLD, PAD, CKD, HTN, multple prior foot infections who presented with dyspnea.  Being treated for HFpEF exacerbation in the setting of hypertension.  #HFpEF RA 20, RVSP 72, PA 72/24 (40), W: poor waveform, ?24. CO 10.4/5.1: Holding torsemide after several days of diuresis. Holding empagliflozin and other GDMT due to VENKAT on CKD.   #VENKAT: Cr at 4.0, potentially from overdiuresis/hypotension. Appears to be stabilizing.   #BP restart amlodipine, continue isordil and hydralazine  #Anemia: consider outpt colonoscopy    Dispo: Follow up with Dr. Sparks on 1/17.     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 CKD stage IV    Stage 4 chronic kidney disease (Multi)    Morbid obesity with body mass index (BMI) of 40.0 or higher (Multi)    Diabetic autonomic neuropathy associated with type 2 diabetes mellitus (Multi)    Acute on chronic diastolic (congestive) heart failure      Objective   Admit Date: 1/2/2025  Hospital Length of Stay: 12   Home: [facility] [address]    Vitals:      1/14/2025     4:02 PM 1/14/2025    11:59 AM 1/14/2025     8:18 AM 1/14/2025     6:30 AM 1/14/2025    12:09 AM 1/13/2025     8:19 PM 1/13/2025     2:59 PM   Vitals   Systolic 116 121 145 133  143 131   Diastolic 71 72 82 62  50 72   BP Location Left arm Left arm  Left arm Left arm Left arm  Left arm   Heart Rate 80 75 79   80    Temp 36.3 °C (97.3 °F) 36.2 °C (97.2 °F) 35.9 °C (96.6 °F) 36.4 °C (97.5 °F) 36.3 °C (97.3 °F)  36.3 °C (97.3 °F)   Resp 15 24 26 14 16     Weight (lb)    221.12      BMI    40.44 kg/m2      BSA (m2)    2.09 m2        Wt Readings from Last 5 Encounters:   01/14/25 100 kg (221 lb 1.9 oz)   11/19/24 99.8 kg (220 lb)   11/15/24 99.8 kg (220 lb 0.3 oz)   09/05/24 99.8 kg (220 lb)   09/04/24 102 kg (224 lb)       Intake/Output Summary (Last 24 hours) at 1/14/2025 1631  Last data filed at 1/14/2025 1159  Gross per 24 hour   Intake 480 ml   Output --   Net 480 ml         MEDICATIONS  Infusions:     Scheduled:  amLODIPine, 10 mg, Daily  aspirin, 81 mg, Daily  atorvastatin, 40 mg, Nightly  capsaicin, , TID  folic acid, 1 mg, Daily  gabapentin, 300 mg, BID  heparin (porcine), 7,500 Units, q8h NEHEMIAS  hydrALAZINE, 50 mg, TID  [START ON 1/15/2025] insulin glargine, 18 Units, Daily  insulin lispro, 0-10 Units, TID AC  iron sucrose, 200 mg, Daily  isosorbide dinitrate, 10 mg, TID  polyethylene glycol, 17 g, Daily  sennosides-docusate sodium, 2 tablet, BID  [Held by provider] torsemide, 60 mg, BID  vitamin B complex-vitamin C-folic acid, 1 capsule, Daily      PRN:  acetaminophen, 650 mg, q6h PRN  albuterol, 2.5 mg, q6h PRN  bisacodyl, 10 mg, Daily PRN  dextrose, 12.5 g, q15 min PRN  dextrose, 25 g, q15 min PRN  glucagon, 1 mg, q15 min PRN  glucagon, 1 mg, q15 min PRN  oxygen, , Continuous PRN - O2/gases  oxygen, , Continuous PRN - O2/gases      Prior to Admission Meds:  Medications Prior to Admission   Medication Sig Dispense Refill Last Dose/Taking    acetaminophen (Tylenol) 325 mg tablet Take 2 tablets (650 mg) by mouth every 4 hours if needed for mild pain (1 - 3).   Past Month    albuterol 90 mcg/actuation inhaler Inhale 2 puffs every 6 hours if needed for wheezing. 6.7 g 0 Past Week    b complex vitamins capsule Take 1 capsule by mouth once daily.   1/1/2025     "cloNIDine (Catapres) 0.1 mg tablet Take 1 tablet (0.1 mg) by mouth 2 times a day. 180 tablet 0 1/1/2025    folic acid (Folvite) 1 mg tablet Take 1 tablet (1 mg) by mouth once daily.   1/1/2025    gabapentin (Neurontin) 300 mg capsule Take 1 capsule (300 mg) by mouth 2 times a day. 180 capsule 0 1/1/2025    insulin glargine (Basaglar KwikPen U-100 Insulin) 100 unit/mL (3 mL) pen Inject 32 Units under the skin once daily in the morning. Take as directed per insulin instructions. 15 mL 1 12/31/2024    insulin lispro (Admelog SoloStar U-100 Insulin) 100 unit/mL injection Inject per sliding scale instructions under the skin 3 times a day with meals. (Max daily dose 70 units) 15 mL 1 12/31/2024    tirzepatide (Mounjaro) 2.5 mg/0.5 mL pen injector Inject 2.5 mg under the skin every 7 days. 2 mL 0 12/30/2024    [DISCONTINUED] amLODIPine (Norvasc) 10 mg tablet Take 1 tablet (10 mg) by mouth once daily. 90 tablet 0 1/1/2025    [DISCONTINUED] aspirin 81 mg chewable tablet Chew 1 tablet (81 mg) once daily. 90 tablet 0 1/1/2025 Noon    [DISCONTINUED] atorvastatin (Lipitor) 40 mg tablet Take 1 tablet (40 mg) by mouth once daily. 90 tablet 0 1/1/2025    blood-glucose sensor (FreeStyle Remington 3 Sensor) device Use to monitor blood glucose. Change sensor every 14 days. 2 each 11     FreeStyle Remington 3 [place] misc Use as instructed to monitor blood glucose. 1 each 0     furosemide (Lasix) 20 mg tablet Take 1 tablet (20 mg) by mouth once daily.       meclizine (Antivert) 25 mg tablet,chewable Chew 1 tablet (25 mg) 3 times a day as needed (vertigo). (Patient not taking: Reported on 1/2/2025) 90 tablet 1 Not Taking    OneTouch Delica Plus Lancet 30 gauge misc USE TO TEST BLOOD SUGAR AS DIRECTED 200 each 0     OneTouch Ultra Test strip Use 1 strip BID to check glucose 200 strip 1     OneTouch Ultra2 Meter misc use 1 to 2 times daily 1 each 0     pen needle, diabetic 32 gauge x 5/32\" needle Use four times a day with insulin use 360 each 1  "       DATA:  CMP:  Recent Labs     01/13/25  1949 01/12/25  2100 01/11/25  1924 01/10/25  2027 01/09/25  1913 01/08/25  1936 01/07/25  1930 01/06/25 2039    137 138 138 138 140 139 141   K 4.3 5.0 4.6 4.7 4.8 4.2 4.2 4.5   CL 97* 96* 99 101 102 104 103 104   CO2 31 25 27 25 24 26 25 23   ANIONGAP 16 21* 17 17 17 14 15 19   BUN 89* 88* 73* 69* 62* 60* 57* 52*   CREATININE 4.06* 4.06* 3.89* 3.85* 3.61* 3.73* 3.82* 3.75*   EGFR 12* 12* 13* 13* 14* 13* 13* 13*   MG 2.71* 2.60* 2.47* 2.36 2.40 2.32 2.27 2.10     Recent Labs     01/13/25  1949 01/12/25  2100 01/11/25  1924 01/10/25  2027 01/09/25  1913 01/08/25  1936 01/07/25  1930 01/06/25 2039 01/02/25  1835 01/02/25  1253 09/06/24  0120 04/17/24  0901 04/15/24  2128 02/07/24  0422 02/06/24  0735 02/04/24  0555 02/03/24  0122 01/26/24  1343 01/26/24  0358 01/25/24  1457 01/24/24  2032 01/23/24  1230 01/12/24  0603 01/11/24  0747 01/09/24  1210 08/13/23  1712 07/04/19  0707 04/02/19  1106 02/17/19  1207   ALBUMIN 3.6 3.4 3.5 3.3* 3.7 3.2* 3.6 3.4   < > 3.6 3.3*   < > 3.5   < > 2.9*   < > 3.4   < > 2.7*   < > 3.4 4.1   < > 2.5*   < > 3.6   < > 3.9 3.8  3.8   ALT  --   --   --   --   --   --   --   --   --  12 10  --  23  --   --   --  22  --  19  --  25 16  --  6   < > 11   < > 12 10   AST  --   --   --   --   --   --   --   --   --  14 11  --  22  --   --   --  21  --  15  --  28 17  --  8   < > 11   < > 13 12   BILITOT  --   --   --   --   --   --   --   --   --  0.4 0.4  --  0.4  --   --   --  0.4  --  0.2  --  0.4 0.3  --  <0.2   < > 0.4   < > 0.5 0.4   LIPASE  --   --   --   --   --   --   --   --   --   --   --   --   --   --  5*  --   --   --   --   --   --   --   --   --   --  <3*  --  7* 3*    < > = values in this interval not displayed.     CBC:  Recent Labs     01/13/25  1949 01/11/25  1924 01/10/25  2027 01/09/25  1913 01/08/25  1936 01/07/25  1930 01/06/25  2039 01/05/25  2008   WBC 5.8 6.5 6.6 5.5 6.7 5.9 6.5 6.5   HGB 10.4* 9.9* 8.9* 9.6* 9.4* 9.9*  9.9* 9.3*   HCT 34.0* 30.0* 29.1* 30.7* 30.5* 32.1* 30.8* 27.9*    383 330 324 326 330 335 298   MCV 97 90 98 95 96 96 93 89     COAG:   Recent Labs     04/15/24  2128 01/29/24  1217 01/24/24  2032 10/21/19  1709 04/02/19  1106 02/17/19  1207   INR 0.9 1.1 1.2* 1.0 1.0 1.1     ABO:   Recent Labs     04/17/24  0901   ABO A     HEME/ENDO:   Recent Labs     01/11/25  1924 01/02/25  1835 11/19/24  1406 07/19/24  0852 04/16/24  0637 02/03/24  0122 01/26/24  0358 01/24/24 2032 01/18/24  0525 01/15/24  0606 09/14/23  1047 06/14/23  1016 05/08/23  1320 01/27/23  0840 01/26/23  1613   FERRITIN 62  --   --   --   --   --   --   --  276* 487*  --   --   --   --   --    IRONSAT 33  --   --   --   --   --   --   --  34 32  --  25  --   --   --    TSH  --   --   --   --   --  0.46 1.37  --   --   --   --  0.83  --   --  0.27*   FREET4  --   --   --   --   --   --   --   --   --   --   --   --   --  1.16 1.14   HGBA1C  --  8.7* 9* 11.2* 11.9*  --   --    < >  --   --    < >  --    < >  --   --     < > = values in this interval not displayed.     CARDIAC:   Recent Labs     01/04/25 2119 01/02/25  1253 02/03/24  0122 01/24/24 2032 01/25/23  1658 01/25/23  1616   TROPHS  --  21 12 25 7 8   * 376* 185* 311* 24  --      Recent Labs     05/08/23  0935 04/12/21  0906 10/20/20  1236   CHOL 174 161 181   LDLF 98 101* 115*   HDL 38.5* 38.9* 41.5   TRIG 189* 104 121     TOX:  Recent Labs     02/03/24  0800   AMPHETAMINE Presumptive Negative   BENZO Presumptive Negative   CANNABINOID Presumptive Negative   COCAI Presumptive Negative   FENTANYL Presumptive Negative   OPIATE Presumptive Negative   OXYCODONE Presumptive Negative   PCP Presumptive Negative     MICRO:   Recent Labs     02/05/24  0446 02/03/24  0953 02/03/24  0122 01/29/24  1217 01/25/24  0819 01/23/24  1230   CRP 1.29*  --  1.86*  --   --  0.21   PROCAL  --  0.05  --  0.05 0.14*  --      No results found for the last 90 days.      FOLLOWUP:   Future Appointments    Date Time Provider Department [place]   1/17/2025 10:00 AM Marylin Sparks MD MPH [id number] [facility]   1/20/2025 11:30 AM Marcin Motta MD [id number] [facility]   1/22/2025  2:30 PM Austin Early DO [id number] [facility]   2/5/2025  9:45 AM Barrera Rodriguez MD [id number] [facility]   2/20/2025 10:30 AM Mone Aquino MD [id number] [facility]

## 2025-01-14 NOTE — PROGRESS NOTES
Subjective Data:   Patient denies CP/SOB at rest. Cr plateau; Weight down, overtly euvolemic on exam, on RA. Discussed CKD and anemia. SBP elevated today. NSVT on tele overnight (mag>2, K>4)    - Hold torsemide  - Monitor RFP  - Resume amlodipine  - Discharge pending stabilizing Cr    Overnight Events:  No acute events overnight.     Objective Data:  Last Recorded Vitals:  Vitals:    01/13/25 2019 01/14/25 0009 01/14/25 0630 01/14/25 0818   BP: 143/50  133/62 145/82   BP Location:   Left arm Left arm   Patient Position:   Lying Lying   Pulse: 80   79   Resp:  16 14 26   Temp:  36.3 °C (97.3 °F) 36.4 °C (97.5 °F) 35.9 °C (96.6 °F)   TempSrc:  Temporal Temporal Temporal   SpO2:  94% 93% 92%   Weight:   100 kg (221 lb 1.9 oz)    Height:         Last Labs:  CBC - 1/13/2025:  7:49 PM  5.8 10.4 410    34.0      CMP - 1/13/2025:  7:49 PM  9.4 6.9 14 --- 0.4   6.5 3.6 12 114      PTT - 4/15/2024:  9:28 PM  0.9   10.3 31     TROPHS   Date/Time Value Ref Range Status   01/02/2025 12:53 PM 21 0 - 34 ng/L Final   02/03/2024 01:22 AM 12 0 - 34 ng/L Final   01/24/2024 08:32 PM 25 0 - 34 ng/L Final   01/25/2023 04:58 PM 7 0 - 34 ng/L Final     Comment:     .  Less than 99th percentile of normal range cutoff-  Female and children under 18 years old <35 ng/L; Male <54 ng/L: Negative  Repeat testing should be performed if clinically indicated.   .  Female and children under 18 years old  ng/L; Male  ng/L:  Consistent with possible cardiac damage and possible increased clinical   risk. Serial measurements may help to assess extent of myocardial damage.   .  >120 ng/L: Consistent with cardiac damage, increased clinical risk and  myocardial infarction. Serial measurements may help assess extent of   myocardial damage.   .   NOTE: Children less than 1 year old may have higher baseline troponin   levels and results should be interpreted in conjunction with the overall   clinical context.  .  NOTE: Troponin I testing is  performed using a different   testing methodology at [facility] than at other   [facility]. Direct result comparisons should only   be made within the same method.     01/25/2023 04:16 PM 8 0 - 34 ng/L Final     Comment:     .  Less than 99th percentile of normal range cutoff-  Female and children under 18 years old <35 ng/L; Male <54 ng/L: Negative  Repeat testing should be performed if clinically indicated.   .  Female and children under 18 years old  ng/L; Male  ng/L:  Consistent with possible cardiac damage and possible increased clinical   risk. Serial measurements may help to assess extent of myocardial damage.   .  >120 ng/L: Consistent with cardiac damage, increased clinical risk and  myocardial infarction. Serial measurements may help assess extent of   myocardial damage.   .   NOTE: Children less than 1 year old may have higher baseline troponin   levels and results should be interpreted in conjunction with the overall   clinical context.  .  NOTE: Troponin I testing is performed using a different   testing methodology at [facility] than at other   [facility]. Direct result comparisons should only   be made within the same method.       BNP   Date/Time Value Ref Range Status   01/04/2025 09:19  0 - 99 pg/mL Final   01/02/2025 12:53  0 - 99 pg/mL Final     HGBA1C   Date/Time Value Ref Range Status   01/02/2025 06:35 PM 8.7 See comment % Final   11/19/2024 02:06 PM 9 4.2 - 6.5 % Final   07/19/2024 08:52 AM 11.2 4.2 - 6.5 % Final     VLDL   Date/Time Value Ref Range Status   05/08/2023 09:35 AM 38 0 - 40 mg/dL Final   04/12/2021 09:06 AM 21 0 - 40 mg/dL Final   10/20/2020 12:36 PM 24 0 - 40 mg/dL Final      Last I/O:  I/O last 3 completed shifts:  In: 720 (7.2 mL/kg) [P.O.:720]  Out: 1000 (10 mL/kg) [Urine:1000 (0.3 mL/kg/hr)]  Weight: 100.3 kg     Past Cardiology Tests (Last 3 Years):    Echo:  Transthoracic Echo (TTE) Complete 01/25/2024      PHYSICIAN INTERPRETATION:  Left Ventricle: The left ventricular systolic function is hyperdynamic, with an estimated ejection fraction of 70-75%. There are no regional wall motion abnormalities. The left ventricular cavity size is normal. The left ventricular septal wall thickness is mildly increased. There is mild concentric left ventricular hypertrophy. Spectral Doppler shows a normal pattern of left ventricular diastolic filling.  Left Atrium: The left atrium is mildly dilated.  Right Ventricle: The right ventricle was not well visualized. There is normal right ventricular global systolic function.  Right Atrium: The right atrium is normal in size.  Aortic Valve: The aortic valve is trileaflet. There is no evidence of aortic valve regurgitation. The peak instantaneous gradient of the aortic valve is 10.0 mmHg. The mean gradient of the aortic valve is 5.0 mmHg.  Mitral Valve: The mitral valve is normal in structure. There is trace to mild mitral valve regurgitation.  Tricuspid Valve: The tricuspid valve is structurally normal. There is trace to mild tricuspid regurgitation. The Doppler estimated RVSP is within normal limits at 32.4 mmHg.  Pulmonic Valve: The pulmonic valve is structurally normal. There is physiologic pulmonic valve regurgitation.  Pericardium: There is a trivial pericardial effusion.  Pleural: There is left pleural effusion.  Aorta: The aortic root is normal.    CONCLUSIONS:  1. Left ventricular systolic function is hyperdynamic with a 70-75% estimated ejection fraction.  2. RVSP within normal limits.    ** Final **    Transthoracic Echo (TTE) Complete 01/3/2025  CONCLUSIONS:   1. Left ventricular ejection fraction is normal, calculated by Argueta's biplane at 69%.   2. Spectral Doppler shows a Grade II (pseudonormal pattern) of left ventricular diastolic filling with an elevated left atrial pressure.   3. There is normal right ventricular global systolic function.   4. The left atrium is  moderately dilated.   5. Moderate mitral valve regurgitation.   6. Mechanism of MR Is possibly restricted motion of the posterior leaflet.   7. Moderate tricuspid regurgitation visualized.   8. Moderately elevated right ventricular systolic pressure.   9. Small pericardial effusion.  10. Compared with study dated 1/25/2024, the degree of MR appears moderate today rather than mild on prior study. TR is also worsened.    Inpatient Medications:  Scheduled medications   Medication Dose Route Frequency    amLODIPine  10 mg oral Daily    aspirin  81 mg oral Daily    atorvastatin  40 mg oral Nightly    capsaicin   Topical TID    folic acid  1 mg oral Daily    gabapentin  300 mg oral BID    heparin (porcine)  7,500 Units subcutaneous q8h NEHEMIAS    hydrALAZINE  50 mg oral TID    insulin glargine  16 Units subcutaneous Daily    insulin lispro  0-10 Units subcutaneous TID AC    iron sucrose  200 mg intravenous Daily    isosorbide dinitrate  10 mg oral TID    polyethylene glycol  17 g oral Daily    sennosides-docusate sodium  2 tablet oral BID    torsemide  60 mg oral BID    vitamin B complex-vitamin C-folic acid  1 capsule oral Daily     PRN medications   Medication    acetaminophen    albuterol    bisacodyl    dextrose    dextrose    glucagon    glucagon    oxygen    oxygen     Continuous Medications   Medication Dose Last Rate     Physical Exam:  General: NAD, lying in bed  Skin: warm and dry throughout  Head/ neck: unable to appreciate JVD  Cardiac: RRR, S1S2S3  Pulm: diminished in BLL, on RA  GI: soft, nontender, non-distended, +BS  Extremities: trace non-pitting edema in BLE  Neuro: no focal neuro deficits, A&Ox4  Psych: appropriate mood and behavior, very pleasant       Assessment/Plan   Nuris Squires is a 59 y.o. female presenting with PMH of poorly controlled insulin dependent type 2 diabetes c/b neuropathy, hyperlipidemia, PAD, CKD stage IV, hypertension, multiple foot infections s/p partial R great toe amputation, fall  w/L femur fracture s/p ORIF (4/2024) that presents to the emergency department today for shortness of breath x1 day. Admitted under HHVI Service for further management of acute ADHF and HTN Urgency.     Acute diastolic heart failure  Chest tightness, resolved  - likely 2/2 uncontrolled HTN, c/f high-output HF   - TTE (1/2024): EF 70-75%  - TTE 1/3/2025: EF 69%, grade II diastolic filling, moderate MR, moderate TR, small pericardial effusion  - neg stress 10/2019  - CXR: pulm vascular congestion BL, small bibasilar pleural effusions  - Last BNP (1/5) 161 (376 on admit iso obesity)  - HS trop 21, no ischemic changes on ECG, no c/f ACS  - dry weight ~100kg  - admit weight: 107kg  - daily weight: 100, (102,104, 108, 108, 107, 106 kg)  - RHC (1/8): reviewed tracings with Dr. Mckee, numbers are questionable but RVEDP 20, PA 75/25, unclear wedge  - of note, pharmacy verified patient on PO lasix 20mg daily at home (?for unclear reasons, presumably prescribed by PCP for leg swelling)-> per patient, was just started on it at home  - s/p successful diuresis w/IV lasix gtt at 15mg/hr and PRN metolazone  - Hold Torsemide at this time per nephrology  - do NOT start empa iso GFR <20  - avoid ACEI/ARB/ARNI, MRA for now d/t rising Cr  - cont. Hydral 50mg TID, isordil 10mg TID  - HF navigator following, establishing care here  - Daily standing weights, strict I&O's, 2g sodium diet, 2L fluid restriction     CKD IV, worsening  - Cr last year 1.7-5.3, worsening since 2022  - admit Cr 2.34  - daily Cr 4.06x2 (3.89, 3.85, 3.61, 3.73)  - urine lytes, osmolality reviewed, FENa 5.6% suggesting intrinsic cause  - Nephrology consultation: Hold torsemide  - Diuresis plan as above    Anemia of unclear origin  Paraesthesias, acute  - c/o paraesthesias around mouth & fingertips, new since admit  - unremarkable colonoscopy in 2019, rec'd to repeat in 7yrs for screening  - Baseline Hgb ~9-11  - Hgb today: 9.9 (8.9, 9.6, 11.1 on admit)  - irons  studies: sat 33%, folate/ferritin/vit. B12 WNL--->cont. IV iron 200mg x5 days, transition to oral after  - no overt signs of bleeding  - capsaicin cream for hands  - cont. OP follow-up w/PCP (scheduled 2/20)    Acute hypoxic respiratory failure iso of ADHF exacerbation, improving  Pulmonary HTN  - p.ox initially at 61% but improved rapidly with placement of nasal cannula  - She was requiring 6 L of oxygen, BiPAP briefly due to the concern for SCAPE, received SL nitro and she was able to wean off of BiPAP  -  weaned from 2L O2 to RA, however, desats at night, c/f RENZO (see below)  - VQ scan (1/9; for pulm HTN given high PA on RHC): low probability of PE  - Flu A/B/RSV/COVID negative  - diuresis plan as above  - pulmonary toileting w/incentive spirometry     HTN urgency, resolved  - BP on on admit: 220/98  - SBP improved to 143 after resumption of home meds and s/p IVP Hydral 5mg x1  - last 24 hour SBP range: low 130-140s  - BPs labile, clonidine weaned off on 1/5, intermittent headaches w/isordil (tolerating 10mg TID)  - Resume Amlodipine    Obesity  Insulin dependent Type 2 diabetes, Poorly controlled  Neuropathy  - Follows with clinical pharmacy closely/PCP  - Hgb A1c 8.7% (improved from previous 11.9%)  - BMI: 41.94  - home regimen: Mounjaro 2.5mg weekly, basaglar 32 units every morning, admelog SS with meals  - c/w lantus 18 units every morning (decreased from 32U iso AM BG of 62) and SSI #2 while inpatient  - OARRS reviewed, cont. Home gabapentin 300mg BID      HLD  PAD  - c/w asa 81mg daily, atorva 40mg daily     C/f RENZO  - reported increased daytime fatigue  - desats on RA in-house while sleeping  - has sleep medicine referral placed    Constipation  - cont. Bowel regimen  - encourage frequent ambulation       DVT ppx: subQ heparin  Emergency contacts: Boyfrienterrie Rousseau #[phone]  Brother Luis Squires #[phone]  DISPO: previously received HHC w/[facility]; likely resume at discharge   - HF navigator  following, established care here  - discharge home pending stabilization of Cr    All labs, vital signs, tests & imaging results, and medications were reviewed.     Patient seen and discussed with Dr. Pizarro    Code Status:  Full Code    Austin Harris PA-C

## 2025-01-14 NOTE — PROGRESS NOTES
NEPHROLOGY FOLLOW UP NOTE    Nuris Squires   59 y.o.      MRN/Room: [id number]    Subjective: Seen at  the bedside in no apparent distress mild edema R>L. Creatinine was previously stable between 3.7 and 3.8 but iris to 4.06 on the 12th and Remained elevated. Patient appeared slightly hypo volemic on exam and was complaining of mouth dryness .     Objective:        3 Day Weight Change: -2.431 kg (-5 lb 5.8 oz) per day       Physical Exam  HENT:      Mouth/Throat:      Comments: No dryness of mucous membranes found on exam.   Eyes:      Extraocular Movements: Extraocular movements intact.      Conjunctiva/sclera: Conjunctivae normal.   Cardiovascular:      Rate and Rhythm: Normal rate and regular rhythm.      Heart sounds: Normal heart sounds.   Pulmonary:      Effort: Pulmonary effort is normal.      Breath sounds: Normal breath sounds.   Abdominal:      General: There is no abdominal bruit.      Palpations: There is no fluid wave.      Tenderness: There is abdominal tenderness in the periumbilical area. There is no right CVA tenderness, left CVA tenderness, guarding or rebound.      Comments: Mild selene-umbilical tenderness noted   Musculoskeletal:      Right lower leg: No edema.      Left lower leg: No edema.      Comments: No lower extremity edema noted. Previously had 1+ edema of RLE.    Skin:     General: Skin is warm and dry.   Neurological:      Mental Status: She is alert.      Comments: No asterixis noted              Meds:   [Held by provider] amLODIPine, 10 mg, Daily  aspirin, 81 mg, Daily  atorvastatin, 40 mg, Nightly  capsaicin, , TID  folic acid, 1 mg, Daily  gabapentin, 300 mg, BID  heparin (porcine), 7,500 Units, q8h NEHEMIAS  hydrALAZINE, 50 mg, TID  insulin glargine, 16 Units, Daily  insulin lispro, 0-10 Units, TID AC  iron sucrose, 200 mg, Daily  isosorbide dinitrate, 10 mg, TID  polyethylene glycol, 17 g, Daily  sennosides-docusate sodium, 2 tablet, BID  torsemide, 60 mg, BID  vitamin B  complex-vitamin C-folic acid, 1 capsule, Daily         acetaminophen, 650 mg, q6h PRN  albuterol, 2.5 mg, q6h PRN  bisacodyl, 10 mg, Daily PRN  dextrose, 12.5 g, q15 min PRN  dextrose, 25 g, q15 min PRN  glucagon, 1 mg, q15 min PRN  glucagon, 1 mg, q15 min PRN  oxygen, , Continuous PRN - O2/gases  oxygen, , Continuous PRN - O2/gases        Vitals:    01/14/25 0818   BP: 145/82   Pulse: 79   Resp: 26   Temp: 35.9 °C (96.6 °F)   SpO2: 92%      Vitals:    01/14/25 0630   Weight: 100 kg (221 lb 1.9 oz)         Intake/Output Summary (Last 24 hours) at 1/14/2025 0819  Last data filed at 1/13/2025 2120  Gross per 24 hour   Intake 720 ml   Output 1000 ml   Net -280 ml           Blood Labs:  Results for orders placed or performed during the hospital encounter of 01/02/25 (from the past 24 hours)   POCT GLUCOSE   Result Value Ref Range    POCT Glucose 114 (H) 74 - 99 mg/dL   POCT GLUCOSE   Result Value Ref Range    POCT Glucose 241 (H) 74 - 99 mg/dL   POCT GLUCOSE   Result Value Ref Range    POCT Glucose 209 (H) 74 - 99 mg/dL   CBC   Result Value Ref Range    WBC 5.8 4.4 - 11.3 x10*3/uL    nRBC 0.0 0.0 - 0.0 /100 WBCs    RBC 3.51 (L) 4.00 - 5.20 x10*6/uL    Hemoglobin 10.4 (L) 12.0 - 16.0 g/dL    Hematocrit 34.0 (L) 36.0 - 46.0 %    MCV 97 80 - 100 fL    MCH 29.6 26.0 - 34.0 pg    MCHC 30.6 (L) 32.0 - 36.0 g/dL    RDW 14.1 11.5 - 14.5 %    Platelets 410 150 - 450 x10*3/uL   Magnesium   Result Value Ref Range    Magnesium 2.71 (H) 1.60 - 2.40 mg/dL   Renal function panel   Result Value Ref Range    Glucose 201 (H) 74 - 99 mg/dL    Sodium 140 136 - 145 mmol/L    Potassium 4.3 3.5 - 5.3 mmol/L    Chloride 97 (L) 98 - 107 mmol/L    Bicarbonate 31 21 - 32 mmol/L    Anion Gap 16 10 - 20 mmol/L    Urea Nitrogen 89 (H) 6 - 23 mg/dL    Creatinine 4.06 (H) 0.50 - 1.05 mg/dL    eGFR 12 (L) >60 mL/min/1.73m*2    Calcium 9.4 8.6 - 10.6 mg/dL    Phosphorus 6.5 (H) 2.5 - 4.9 mg/dL    Albumin 3.6 3.4 - 5.0 g/dL   POCT GLUCOSE   Result Value  Ref Range    POCT Glucose 236 (H) 74 - 99 mg/dL   POCT GLUCOSE   Result Value Ref Range    POCT Glucose 185 (H) 74 - 99 mg/dL     *Note: Due to a large number of results and/or encounters for the requested time period, some results have not been displayed. A complete set of results can be found in Results Review.          ASSESSMENT:  Nuris Squires is a  59 y.o.  Year old , with PMHx of  poorly controlled insulin dependent type 2 diabetes c/b neuropathy, hyperlipidemia, PAD, CKD stage IV, hypertension, multiple foot infections s/p partial R great toe amputation, fall w/L femur fracture s/p ORIF (4/2024) that presents to the emergency department for SOB. Admitted under HHVI Service for further management of acute ADHF and HTN Urgency.     VENKAT was previously stable but acutely worsened on the 12th. Likely etiologies include pre-renal causes due to over diuresis. Could also be post-renal 2/2 hypotension pressures throughout the day on January 12th were consistently low with systolic's in low 100's and upper 90's far from the patients baseline systolic BP in the 140's. Volume status on physical exam appeared mildly hypervolemic, could be further investigated with POCUS for a more definitive understanding of volume status.     #Normotensive ischemic injury with CRS   #VENKAT on ProteinuricCKD IV, nonoliguric, Stage II Liely 2/2 Diabetic Kidney disease   - Baseline creatinine: 1.68   - Etiology: Normotensive ischemic injury with CRS   - UA: was not completed   - Urine electrolyes showed FeNA of 5.4 % (1/8/25) Post renal >4 etiology.This was after multiple days of diuresis and is not definitive  - Clinical volume status: appears euvolemic to mildly hypervolemic  - Electrolytes (Na, K, Ca, ) are stable. Magnesium and Phos are both elevated  - Acid base status: Anion gap 14. Last Ph 7.32 by VBG on January 2nd   - Intake output 01/13: -1.0 L +.720 L: Net -280   - I/O Cumulative(January 2nd - 13th) : in 8.69 L; Out: 17.6 L. Net:  -8.91 L       Recommendations:  - Check Urine Urea for FeUrea   - Indication for dialysis:  No   - Avoid nephrotoxins, contrast if possible  - Hold Torsemide on January 14th, Nephro to re-evaluate VENKAT and volume status on 1/15  - Continue to maintain SBP over 120   - strict Is/Os  - Renal dosing for medications for latest eGFR, follow medication trough as appropriate  - Avoid hypotension/rapid fluctuations in BPs    Michael Farmer MD  Nephrology Resident  24 hour Renal Pager - [phone]    Discussed with attending nephrologist

## 2025-01-14 NOTE — DOCUMENTATION CLARIFICATION NOTE
"    PATIENT:               ALBERT RIDLEY  ACCT #:                  [id number]  MRN:                       [id number]  :                       1965  ADMIT DATE:       2025 12:11 PM  DISCH DATE:  RESPONDING PROVIDER #:        [id number]          PROVIDER RESPONSE TEXT:    Acute kidney injury without acute tubular necrosis    CDI QUERY TEXT:    Clarification        Instruction:    Based on your assessment of the patient and the clinical information, please provide the requested documentation by clicking on the appropriate radio button and enter any additional information if prompted.    Question: Please further clarify the diagnosis of acute kidney injury as    When answering this query, please exercise your independent professional judgment. The fact that a question is being asked, does not imply that any particular answer is desired or expected.    The patient's clinical indicators include:  Clinical Information: Progress notes indicate pt is a 60 y/o female admitted with HTN Urgency and ADHF    Clinical Indicators:   ED: \"The patient does have evidence of an VENKAT which is likely secondary to cardiorenal syndrome and would benefit from diuresis.\"   Nephrology Consult: \"Likely Normotensive/hypotensive ischemic ATN in a patient admitted with ADHF\"   Cardiology PN: VENKAT: \"Cr at 4.0, potentially from overdiuresis/hypotension.\"   Cardiology PN: \"CKD IV, worsening - Cr last year 1.7-5.3, worsening since  - admit Cr 2.34 - daily Cr 4.06x2 (3.89, 3.85, 3.61, 3.73) - urine lytes, osmolality reviewed, FENa 5.6% suggesting intrinsic cause\"   Neph PN: \"Hold Torsemide on , Nephro to re-evaluate VENKAT and volume status on 1/15\"    Treatment: Nephrology Consult, Monitoring daily RFP, Urine Chemistry on , Monitoring I/Os, Holding diuresis    Risk Factors: Diuresis, Hypotension  Options provided:  -- Acute kidney injury with acute tubular necrosis  -- Acute kidney injury without acute tubular " necrosis  -- Other - I will add my own diagnosis  -- Refer to Clinical Documentation Reviewer    Query created by: Lilia Dillard on 1/14/2025 12:27 PM      Electronically signed by:  DEVYN LEVI MD 1/14/2025 4:36 PM

## 2025-01-14 NOTE — SIGNIFICANT EVENT
Rapid Response Nurse Note: LAYNE score of 6    Pager time: [phone]  Arrival time: 820  Event end time: 824  Location: [place] [id number]  [] Triage by secure messaging    Rapid response initiated by:  [] Rapid response RN [] Family [] Nursing Supervisor [] Physician   [x] RADAR auto page [] Sepsis auto-page [] RN [] RT   [] NP/PA [] Other:     Primary reason for call:   [] BAT [] New CPAP/BiPAP [] Bleeding [] Change in mental status   [] Chest pain [] Code blue [] FiO2 >/= 50% [] HR </= 40 bpm   [] HR >/= 130 bpm [] Hyperglycemia [] Hypoglycemia [] RADAR    [] RR </= 8 bpm [] RR >/= 30 bpm [] SBP </= 90 mmHg [] SpO2 < 90%   [] Seizure [] Sepsis [] Shortness of breath  [] Staff concern: see comments     Interventions:  [x] None [] ABG/VBG [] Assist w/ICU transfer [] BAT paged    [] Bag mask [] Blood [] Cardioversion [] Code Blue   [] Code blue for intubation [] Code status changed [] Chest x-ray [] EKG   [] IV fluid/bolus [] KUB x-ray [] Labs/cultures [] Medication   [] Nebulizer treatment [] NIPPV (CPAP/BiPAP) [] Oxygen [] Oral airway   [] Peripheral IV [] Palliative care consult [] CT/MRI [] Sepsis protocol    [] Suctioned [] Other:     Outcome:  [] Coded and  [] Code blue for intubation [] Coded and transferred to ICU []  on division   [x] Remained on division (no change) [] Remained on division + additional monitoring [] Remained in ED [] Transferred to ED   [] Transferred to ICU [] Transferred to inpatient status [] Transferred for interventions (procedure) [] Transferred to ICU stepdown    [] Transferred to surgery [] Transferred to telemetry [] Sepsis protocol [] STEMI protocol   [] Stroke protocol       Additional Comments:      Notified nurse of LAYNE page: [phone]/82 92.  No concerns per nurse at this time; encouraged nurse to call a rapid response if patient status changes.

## 2025-01-15 ENCOUNTER — APPOINTMENT (OUTPATIENT)
Dept: CARDIOLOGY | Facility: HOSPITAL | Age: 60
End: 2025-01-15
Payer: COMMERCIAL

## 2025-01-15 ENCOUNTER — APPOINTMENT (OUTPATIENT)
Dept: RADIOLOGY | Facility: HOSPITAL | Age: 60
End: 2025-01-15
Payer: COMMERCIAL

## 2025-01-15 LAB
ALBUMIN SERPL BCP-MCNC: 3.3 G/DL (ref 3.4–5)
ANION GAP SERPL CALC-SCNC: 17 MMOL/L (ref 10–20)
APPEARANCE UR: CLEAR
BILIRUB UR STRIP.AUTO-MCNC: NEGATIVE MG/DL
BUN SERPL-MCNC: 91 MG/DL (ref 6–23)
CALCIUM SERPL-MCNC: 8.7 MG/DL (ref 8.6–10.6)
CHLORIDE SERPL-SCNC: 99 MMOL/L (ref 98–107)
CO2 SERPL-SCNC: 27 MMOL/L (ref 21–32)
COLOR UR: ABNORMAL
CREAT SERPL-MCNC: 4.27 MG/DL (ref 0.5–1.05)
CREAT UR-MCNC: 61.7 MG/DL (ref 20–320)
EGFRCR SERPLBLD CKD-EPI 2021: 11 ML/MIN/1.73M*2
ERYTHROCYTE [DISTWIDTH] IN BLOOD BY AUTOMATED COUNT: 14.3 % (ref 11.5–14.5)
GLUCOSE BLD MANUAL STRIP-MCNC: 182 MG/DL (ref 74–99)
GLUCOSE BLD MANUAL STRIP-MCNC: 229 MG/DL (ref 74–99)
GLUCOSE BLD MANUAL STRIP-MCNC: 259 MG/DL (ref 74–99)
GLUCOSE BLD MANUAL STRIP-MCNC: 264 MG/DL (ref 74–99)
GLUCOSE SERPL-MCNC: 276 MG/DL (ref 74–99)
GLUCOSE UR STRIP.AUTO-MCNC: ABNORMAL MG/DL
HCT VFR BLD AUTO: 32.2 % (ref 36–46)
HGB BLD-MCNC: 9.4 G/DL (ref 12–16)
HOLD SPECIMEN: NORMAL
KETONES UR STRIP.AUTO-MCNC: NEGATIVE MG/DL
LEUKOCYTE ESTERASE UR QL STRIP.AUTO: NEGATIVE
MAGNESIUM SERPL-MCNC: 2.64 MG/DL (ref 1.6–2.4)
MCH RBC QN AUTO: 30.9 PG (ref 26–34)
MCHC RBC AUTO-ENTMCNC: 29.2 G/DL (ref 32–36)
MCV RBC AUTO: 106 FL (ref 80–100)
NITRITE UR QL STRIP.AUTO: NEGATIVE
NRBC BLD-RTO: 0 /100 WBCS (ref 0–0)
PH UR STRIP.AUTO: 6.5 [PH]
PHOSPHATE SERPL-MCNC: 5.9 MG/DL (ref 2.5–4.9)
PLATELET # BLD AUTO: 371 X10*3/UL (ref 150–450)
POTASSIUM SERPL-SCNC: 4.5 MMOL/L (ref 3.5–5.3)
PROT UR STRIP.AUTO-MCNC: ABNORMAL MG/DL
RBC # BLD AUTO: 3.04 X10*6/UL (ref 4–5.2)
RBC # UR STRIP.AUTO: NEGATIVE /UL
RBC #/AREA URNS AUTO: NORMAL /HPF
SODIUM SERPL-SCNC: 138 MMOL/L (ref 136–145)
SP GR UR STRIP.AUTO: 1.01
UREA/CREAT UR-SRTO: 6.8 G/G CREAT
UROBILINOGEN UR STRIP.AUTO-MCNC: NORMAL MG/DL
UUN UR-MCNC: 422 MG/DL
WBC # BLD AUTO: 5.9 X10*3/UL (ref 4.4–11.3)
WBC #/AREA URNS AUTO: NORMAL /HPF

## 2025-01-15 PROCEDURE — 83735 ASSAY OF MAGNESIUM: CPT | Performed by: NURSE PRACTITIONER

## 2025-01-15 PROCEDURE — 82947 ASSAY GLUCOSE BLOOD QUANT: CPT

## 2025-01-15 PROCEDURE — 2500000004 HC RX 250 GENERAL PHARMACY W/ HCPCS (ALT 636 FOR OP/ED): Performed by: NURSE PRACTITIONER

## 2025-01-15 PROCEDURE — 2500000001 HC RX 250 WO HCPCS SELF ADMINISTERED DRUGS (ALT 637 FOR MEDICARE OP): Performed by: NURSE PRACTITIONER

## 2025-01-15 PROCEDURE — 85027 COMPLETE CBC AUTOMATED: CPT | Performed by: NURSE PRACTITIONER

## 2025-01-15 PROCEDURE — 99233 SBSQ HOSP IP/OBS HIGH 50: CPT | Performed by: INTERNAL MEDICINE

## 2025-01-15 PROCEDURE — 2500000002 HC RX 250 W HCPCS SELF ADMINISTERED DRUGS (ALT 637 FOR MEDICARE OP, ALT 636 FOR OP/ED): Performed by: STUDENT IN AN ORGANIZED HEALTH CARE EDUCATION/TRAINING PROGRAM

## 2025-01-15 PROCEDURE — 2500000001 HC RX 250 WO HCPCS SELF ADMINISTERED DRUGS (ALT 637 FOR MEDICARE OP): Performed by: STUDENT IN AN ORGANIZED HEALTH CARE EDUCATION/TRAINING PROGRAM

## 2025-01-15 PROCEDURE — 1200000002 HC GENERAL ROOM WITH TELEMETRY DAILY

## 2025-01-15 PROCEDURE — 76770 US EXAM ABDO BACK WALL COMP: CPT | Performed by: RADIOLOGY

## 2025-01-15 PROCEDURE — 81001 URINALYSIS AUTO W/SCOPE: CPT | Performed by: NURSE PRACTITIONER

## 2025-01-15 PROCEDURE — 2500000002 HC RX 250 W HCPCS SELF ADMINISTERED DRUGS (ALT 637 FOR MEDICARE OP, ALT 636 FOR OP/ED): Performed by: NURSE PRACTITIONER

## 2025-01-15 PROCEDURE — 76770 US EXAM ABDO BACK WALL COMP: CPT

## 2025-01-15 PROCEDURE — 82310 ASSAY OF CALCIUM: CPT | Performed by: NURSE PRACTITIONER

## 2025-01-15 PROCEDURE — 93005 ELECTROCARDIOGRAM TRACING: CPT

## 2025-01-15 PROCEDURE — 82570 ASSAY OF URINE CREATININE: CPT | Performed by: STUDENT IN AN ORGANIZED HEALTH CARE EDUCATION/TRAINING PROGRAM

## 2025-01-15 PROCEDURE — 2500000001 HC RX 250 WO HCPCS SELF ADMINISTERED DRUGS (ALT 637 FOR MEDICARE OP)

## 2025-01-15 PROCEDURE — 51702 INSERT TEMP BLADDER CATH: CPT

## 2025-01-15 PROCEDURE — 99233 SBSQ HOSP IP/OBS HIGH 50: CPT

## 2025-01-15 PROCEDURE — 36415 COLL VENOUS BLD VENIPUNCTURE: CPT | Performed by: NURSE PRACTITIONER

## 2025-01-15 PROCEDURE — 93010 ELECTROCARDIOGRAM REPORT: CPT | Performed by: INTERNAL MEDICINE

## 2025-01-15 RX ADMIN — HYDRALAZINE HYDROCHLORIDE 50 MG: 50 TABLET ORAL at 14:10

## 2025-01-15 RX ADMIN — FOLIC ACID 1 MG: 1 TABLET ORAL at 08:31

## 2025-01-15 RX ADMIN — GABAPENTIN 300 MG: 300 CAPSULE ORAL at 20:15

## 2025-01-15 RX ADMIN — HEPARIN SODIUM 7500 UNITS: 5000 INJECTION INTRAVENOUS; SUBCUTANEOUS at 06:25

## 2025-01-15 RX ADMIN — INSULIN LISPRO 2 UNITS: 100 INJECTION, SOLUTION INTRAVENOUS; SUBCUTANEOUS at 08:31

## 2025-01-15 RX ADMIN — ATORVASTATIN CALCIUM 40 MG: 40 TABLET, FILM COATED ORAL at 20:15

## 2025-01-15 RX ADMIN — GABAPENTIN 300 MG: 300 CAPSULE ORAL at 08:31

## 2025-01-15 RX ADMIN — ISOSORBIDE DINITRATE 10 MG: 10 TABLET ORAL at 20:15

## 2025-01-15 RX ADMIN — AMLODIPINE BESYLATE 10 MG: 10 TABLET ORAL at 08:31

## 2025-01-15 RX ADMIN — INSULIN GLARGINE 18 UNITS: 100 INJECTION, SOLUTION SUBCUTANEOUS at 08:31

## 2025-01-15 RX ADMIN — IRON SUCROSE 200 MG: 20 INJECTION, SOLUTION INTRAVENOUS at 06:25

## 2025-01-15 RX ADMIN — HEPARIN SODIUM 7500 UNITS: 5000 INJECTION INTRAVENOUS; SUBCUTANEOUS at 14:14

## 2025-01-15 RX ADMIN — HYDRALAZINE HYDROCHLORIDE 50 MG: 50 TABLET ORAL at 20:15

## 2025-01-15 RX ADMIN — ASPIRIN 81 MG: 81 TABLET, CHEWABLE ORAL at 08:31

## 2025-01-15 RX ADMIN — ISOSORBIDE DINITRATE 10 MG: 10 TABLET ORAL at 08:31

## 2025-01-15 RX ADMIN — INSULIN LISPRO 6 UNITS: 100 INJECTION, SOLUTION INTRAVENOUS; SUBCUTANEOUS at 18:47

## 2025-01-15 RX ADMIN — NEPHROCAP 1 CAPSULE: 1 CAP ORAL at 08:31

## 2025-01-15 RX ADMIN — ISOSORBIDE DINITRATE 10 MG: 10 TABLET ORAL at 14:09

## 2025-01-15 RX ADMIN — HEPARIN SODIUM 7500 UNITS: 5000 INJECTION INTRAVENOUS; SUBCUTANEOUS at 21:25

## 2025-01-15 RX ADMIN — HYDRALAZINE HYDROCHLORIDE 50 MG: 50 TABLET ORAL at 08:31

## 2025-01-15 ASSESSMENT — COGNITIVE AND FUNCTIONAL STATUS - GENERAL
MOBILITY SCORE: 22
DAILY ACTIVITIY SCORE: 24
WALKING IN HOSPITAL ROOM: A LITTLE
CLIMB 3 TO 5 STEPS WITH RAILING: A LITTLE

## 2025-01-15 ASSESSMENT — PAIN SCALES - GENERAL: PAINLEVEL_OUTOF10: 0 - NO PAIN

## 2025-01-15 ASSESSMENT — PAIN - FUNCTIONAL ASSESSMENT: PAIN_FUNCTIONAL_ASSESSMENT: 0-10

## 2025-01-15 NOTE — PROGRESS NOTES
[facility] Attending Shared Visit Note    This is a shared visit. Please see Advanced Practice Provider's encounter note for additional details.        Overnight events/Subjective: Creatinine still rising    Exam:   Physical exam: Well appearing, no distress. Normal rate, regular rhythm, non labored breathing, clear to auscultation, abdomen non distended, no LE edema, no focal neuro deficits.     A/P:   59 F with IDDM with complications, HLD, PAD, CKD, HTN, multple prior foot infections who presented with dyspnea.  Was treated for HFpEF exacerbation in the setting of hypertension.    #VENKAT: creatinine continues to rise. Perhaps there is a component of obstructive uropathy as she now requires a adhikari. Will obtain UA and kidney and bladder ultrasound. With a cardiac output of 10 L/min, cardiorenal syndrome seems unlikely and she has not received diuresis in the last few days. Await renal input.   #HFpEF: on 1/7 RA 20, RVSP 72, PA 72/24 (40), W: poor waveform, ?24. CO 10.4/5.1: Continue holding torsemide, SGLT2i.   #BP continue amlodipine, isordil and hydralazine  #Anemia: consider outpt colonoscopy    Dispo: Follow up with Dr. Sparks will need to be rescheduled.     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 CKD stage IV    Stage 4 chronic kidney disease (Multi)    Morbid obesity with body mass index (BMI) of 40.0 or higher (Multi)    Diabetic autonomic neuropathy associated with type 2 diabetes mellitus (Multi)    Acute on chronic diastolic (congestive) heart failure      Objective   Admit Date: 1/2/2025  Hospital Length of Stay: 13   Home: [facility] [id number]    Vitals:      1/15/2025     8:14 AM 1/15/2025     5:00 AM 1/15/2025    12:25 AM 1/14/2025     8:37 PM 1/14/2025     4:02 PM 1/14/2025    11:59  AM 1/14/2025     8:18 AM   Vitals   Systolic 164 124 128 135 116 121 145   Diastolic 86 77 74 74 71 72 82   BP Location Left arm    Left arm Left arm Left arm   Heart Rate 78 70 74 86 80 75 79   Temp 35.9 °C (96.6 °F) 36.5 °C (97.7 °F) 36.4 °C (97.5 °F) 36.6 °C (97.9 °F) 36.3 °C (97.3 °F) 36.2 °C (97.2 °F) 35.9 °C (96.6 °F)   Resp 19 15 14 18 15 24 26   Weight (lb)  225.09        BMI  41.17 kg/m2        BSA (m2)  2.11 m2          Wt Readings from Last 5 Encounters:   01/15/25 102 kg (225 lb 1.4 oz)   11/19/24 99.8 kg (220 lb)   11/15/24 99.8 kg (220 lb 0.3 oz)   09/05/24 99.8 kg (220 lb)   09/04/24 102 kg (224 lb)       Intake/Output Summary (Last 24 hours) at 1/15/2025 1152  Last data filed at 1/15/2025 0845  Gross per 24 hour   Intake 373.33 ml   Output 700 ml   Net -326.67 ml         MEDICATIONS  Infusions:     Scheduled:  amLODIPine, 10 mg, Daily  aspirin, 81 mg, Daily  atorvastatin, 40 mg, Nightly  capsaicin, , TID  folic acid, 1 mg, Daily  gabapentin, 300 mg, BID  heparin (porcine), 7,500 Units, q8h NEHEMIAS  hydrALAZINE, 50 mg, TID  insulin glargine, 18 Units, Daily  insulin lispro, 0-10 Units, TID AC  iron sucrose, 200 mg, Daily  isosorbide dinitrate, 10 mg, TID  polyethylene glycol, 17 g, Daily  sennosides-docusate sodium, 2 tablet, BID  [Held by provider] torsemide, 60 mg, BID  vitamin B complex-vitamin C-folic acid, 1 capsule, Daily      PRN:  acetaminophen, 650 mg, q6h PRN  albuterol, 2.5 mg, q6h PRN  bisacodyl, 10 mg, Daily PRN  dextrose, 12.5 g, q15 min PRN  dextrose, 25 g, q15 min PRN  glucagon, 1 mg, q15 min PRN  glucagon, 1 mg, q15 min PRN  oxygen, , Continuous PRN - O2/gases  oxygen, , Continuous PRN - O2/gases      Prior to Admission Meds:  Medications Prior to Admission   Medication Sig Dispense Refill Last Dose/Taking    acetaminophen (Tylenol) 325 mg tablet Take 2 tablets (650 mg) by mouth every 4 hours if needed for mild pain (1 - 3).   Past Month    albuterol 90 mcg/actuation inhaler Inhale 2  puffs every 6 hours if needed for wheezing. 6.7 g 0 Past Week    b complex vitamins capsule Take 1 capsule by mouth once daily.   1/1/2025    cloNIDine (Catapres) 0.1 mg tablet Take 1 tablet (0.1 mg) by mouth 2 times a day. 180 tablet 0 1/1/2025    folic acid (Folvite) 1 mg tablet Take 1 tablet (1 mg) by mouth once daily.   1/1/2025    gabapentin (Neurontin) 300 mg capsule Take 1 capsule (300 mg) by mouth 2 times a day. 180 capsule 0 1/1/2025    insulin glargine (Basaglar KwikPen U-100 Insulin) 100 unit/mL (3 mL) pen Inject 32 Units under the skin once daily in the morning. Take as directed per insulin instructions. 15 mL 1 12/31/2024    insulin lispro (Admelog SoloStar U-100 Insulin) 100 unit/mL injection Inject per sliding scale instructions under the skin 3 times a day with meals. (Max daily dose 70 units) 15 mL 1 12/31/2024    tirzepatide (Mounjaro) 2.5 mg/0.5 mL pen injector Inject 2.5 mg under the skin every 7 days. 2 mL 0 12/30/2024    [DISCONTINUED] amLODIPine (Norvasc) 10 mg tablet Take 1 tablet (10 mg) by mouth once daily. 90 tablet 0 1/1/2025    [DISCONTINUED] aspirin 81 mg chewable tablet Chew 1 tablet (81 mg) once daily. 90 tablet 0 1/1/2025 Noon    [DISCONTINUED] atorvastatin (Lipitor) 40 mg tablet Take 1 tablet (40 mg) by mouth once daily. 90 tablet 0 1/1/2025    blood-glucose sensor (FreeStyle Remington 3 Sensor) device Use to monitor blood glucose. Change sensor every 14 days. 2 each 11     FreeStyle Remington 3 [place] misc Use as instructed to monitor blood glucose. 1 each 0     furosemide (Lasix) 20 mg tablet Take 1 tablet (20 mg) by mouth once daily.       meclizine (Antivert) 25 mg tablet,chewable Chew 1 tablet (25 mg) 3 times a day as needed (vertigo). (Patient not taking: Reported on 1/2/2025) 90 tablet 1 Not Taking    OneTouch Delica Plus Lancet 30 gauge misc USE TO TEST BLOOD SUGAR AS DIRECTED 200 each 0     OneTouch Ultra Test strip Use 1 strip BID to check glucose 200 strip 1     OneTouch Ultra2  "Meter misc use 1 to 2 times daily 1 each 0     pen needle, diabetic 32 gauge x 5/32\" needle Use four times a day with insulin use 360 each 1        DATA:  CMP:  Recent Labs     01/14/25 1847 01/13/25  1949 01/12/25  2100 01/11/25  1924 01/10/25  2027 01/09/25  1913 01/08/25  1936 01/07/25  1930    140 137 138 138 138 140 139   K 4.4 4.3 5.0 4.6 4.7 4.8 4.2 4.2   CL 98 97* 96* 99 101 102 104 103   CO2 29 31 25 27 25 24 26 25   ANIONGAP 17 16 21* 17 17 17 14 15   BUN 90* 89* 88* 73* 69* 62* 60* 57*   CREATININE 4.28* 4.06* 4.06* 3.89* 3.85* 3.61* 3.73* 3.82*   EGFR 11* 12* 12* 13* 13* 14* 13* 13*   MG 2.64* 2.71* 2.60* 2.47* 2.36 2.40 2.32 2.27     Recent Labs     01/14/25 1847 01/13/25 1949 01/12/25  2100 01/11/25  1924 01/10/25  2027 01/09/25  1913 01/08/25  1936 01/07/25  1930 01/02/25  1835 01/02/25  1253 09/06/24  0120 04/17/24  0901 04/15/24  2128 02/07/24  0422 02/06/24  0735 02/04/24  0555 02/03/24  0122 01/26/24  1343 01/26/24  0358 01/25/24  1457 01/24/24  2032 01/23/24  1230 01/12/24  0603 01/11/24  0747 01/09/24  1210 08/13/23  1712 07/04/19  0707 04/02/19  1106 02/17/19  1207   ALBUMIN 3.5 3.6 3.4 3.5 3.3* 3.7 3.2* 3.6   < > 3.6 3.3*   < > 3.5   < > 2.9*   < > 3.4   < > 2.7*   < > 3.4 4.1   < > 2.5*   < > 3.6   < > 3.9 3.8  3.8   ALT  --   --   --   --   --   --   --   --   --  12 10  --  23  --   --   --  22  --  19  --  25 16  --  6   < > 11   < > 12 10   AST  --   --   --   --   --   --   --   --   --  14 11  --  22  --   --   --  21  --  15  --  28 17  --  8   < > 11   < > 13 12   BILITOT  --   --   --   --   --   --   --   --   --  0.4 0.4  --  0.4  --   --   --  0.4  --  0.2  --  0.4 0.3  --  <0.2   < > 0.4   < > 0.5 0.4   LIPASE  --   --   --   --   --   --   --   --   --   --   --   --   --   --  5*  --   --   --   --   --   --   --   --   --   --  <3*  --  7* 3*    < > = values in this interval not displayed.     CBC:  Recent Labs     01/14/25 1847 01/13/25 1949 01/11/25 1924 " 01/10/25  2027 01/09/25  1913 01/08/25  1936 01/07/25  1930 01/06/25  2039   WBC 6.4 5.8 6.5 6.6 5.5 6.7 5.9 6.5   HGB 9.7* 10.4* 9.9* 8.9* 9.6* 9.4* 9.9* 9.9*   HCT 30.3* 34.0* 30.0* 29.1* 30.7* 30.5* 32.1* 30.8*    410 383 330 324 326 330 335   MCV 96 97 90 98 95 96 96 93     COAG:   Recent Labs     04/15/24  2128 01/29/24  1217 01/24/24  2032 10/21/19  1709 04/02/19  1106 02/17/19  1207   INR 0.9 1.1 1.2* 1.0 1.0 1.1     ABO:   Recent Labs     04/17/24  0901   ABO A     HEME/ENDO:   Recent Labs     01/11/25  1924 01/02/25  1835 11/19/24  1406 07/19/24  0852 04/16/24  0637 02/03/24  0122 01/26/24  0358 01/24/24 2032 01/18/24  0525 01/15/24  0606 09/14/23  1047 06/14/23  1016 05/08/23  1320 01/27/23  0840 01/26/23  1613   FERRITIN 62  --   --   --   --   --   --   --  276* 487*  --   --   --   --   --    IRONSAT 33  --   --   --   --   --   --   --  34 32  --  25  --   --   --    TSH  --   --   --   --   --  0.46 1.37  --   --   --   --  0.83  --   --  0.27*   FREET4  --   --   --   --   --   --   --   --   --   --   --   --   --  1.16 1.14   HGBA1C  --  8.7* 9* 11.2* 11.9*  --   --    < >  --   --    < >  --    < >  --   --     < > = values in this interval not displayed.     CARDIAC:   Recent Labs     01/04/25  2119 01/02/25  1253 02/03/24  0122 01/24/24  2032 01/25/23  1658 01/25/23  1616   TROPHS  --  21 12 25 7 8   * 376* 185* 311* 24  --      Recent Labs     05/08/23  0935 04/12/21  0906 10/20/20  1236   CHOL 174 161 181   LDLF 98 101* 115*   HDL 38.5* 38.9* 41.5   TRIG 189* 104 121     TOX:  Recent Labs     02/03/24  0800   AMPHETAMINE Presumptive Negative   BENZO Presumptive Negative   CANNABINOID Presumptive Negative   COCAI Presumptive Negative   FENTANYL Presumptive Negative   OPIATE Presumptive Negative   OXYCODONE Presumptive Negative   PCP Presumptive Negative     MICRO:   Recent Labs     02/05/24  0446 02/03/24  0953 02/03/24  0122 01/29/24  1217 01/25/24  0819 01/23/24  1230   CRP  1.29*  --  1.86*  --   --  0.21   PROCAL  --  0.05  --  0.05 0.14*  --      No results found for the last 90 days.      FOLLOWUP:   Future Appointments   Date Time Provider Department Center   1/17/2025 10:00 AM Marylin Sparks MD MPH [id number] [facility]   1/20/2025 11:30 AM Marcin Motta MD [id number] [facility]   1/22/2025  2:30 PM Austin Early DO [id number] [facility]   2/5/2025  9:45 AM Barrera Rodriguez MD [id number] [facility]   2/20/2025 10:30 AM Mone Aquino MD [id number] [facility]

## 2025-01-15 NOTE — PROGRESS NOTES
Subjective Data:   Patient had difficulty urinating this am. 1000ml in bladder, voided 400 still had 600 in bladder with post void scan. Will be getting adhikari as she still has abdominal pressure. No dysuria, frequency.     Overnight Events:  No acute events overnight.     Objective Data:  Last Recorded Vitals:  Vitals:    01/15/25 0025 01/15/25 0500 01/15/25 0814 01/15/25 1100   BP: 128/74 124/77 164/86 108/61   BP Location:   Left arm Left arm   Patient Position: Lying Lying Lying Lying   Pulse: 74 70 78 94   Resp: 14 15 19 25   Temp: 36.4 °C (97.5 °F) 36.5 °C (97.7 °F) 35.9 °C (96.6 °F) 36.4 °C (97.5 °F)   TempSrc:   Temporal Temporal   SpO2: 92% 93% 92% 92%   Weight:  102 kg (225 lb 1.4 oz)     Height:         Last Labs:  CBC - 1/14/2025:  6:47 PM  6.4 9.7 415    30.3      CMP - 1/14/2025:  6:47 PM  9.0 6.9 14 --- 0.4   5.8 3.5 12 114      PTT - 4/15/2024:  9:28 PM  0.9   10.3 31     TROPHS   Date/Time Value Ref Range Status   01/02/2025 12:53 PM 21 0 - 34 ng/L Final   02/03/2024 01:22 AM 12 0 - 34 ng/L Final   01/24/2024 08:32 PM 25 0 - 34 ng/L Final   01/25/2023 04:58 PM 7 0 - 34 ng/L Final     Comment:     .  Less than 99th percentile of normal range cutoff-  Female and children under 18 years old <35 ng/L; Male <54 ng/L: Negative  Repeat testing should be performed if clinically indicated.   .  Female and children under 18 years old  ng/L; Male  ng/L:  Consistent with possible cardiac damage and possible increased clinical   risk. Serial measurements may help to assess extent of myocardial damage.   .  >120 ng/L: Consistent with cardiac damage, increased clinical risk and  myocardial infarction. Serial measurements may help assess extent of   myocardial damage.   .   NOTE: Children less than 1 year old may have higher baseline troponin   levels and results should be interpreted in conjunction with the overall   clinical context.  .  NOTE: Troponin I testing is performed using a different   testing  methodology at [facility] than at other   [facility]. Direct result comparisons should only   be made within the same method.     01/25/2023 04:16 PM 8 0 - 34 ng/L Final     Comment:     .  Less than 99th percentile of normal range cutoff-  Female and children under 18 years old <35 ng/L; Male <54 ng/L: Negative  Repeat testing should be performed if clinically indicated.   .  Female and children under 18 years old  ng/L; Male  ng/L:  Consistent with possible cardiac damage and possible increased clinical   risk. Serial measurements may help to assess extent of myocardial damage.   .  >120 ng/L: Consistent with cardiac damage, increased clinical risk and  myocardial infarction. Serial measurements may help assess extent of   myocardial damage.   .   NOTE: Children less than 1 year old may have higher baseline troponin   levels and results should be interpreted in conjunction with the overall   clinical context.  .  NOTE: Troponin I testing is performed using a different   testing methodology at [facility] than at other   [facility]. Direct result comparisons should only   be made within the same method.       BNP   Date/Time Value Ref Range Status   01/04/2025 09:19  0 - 99 pg/mL Final   01/02/2025 12:53  0 - 99 pg/mL Final     HGBA1C   Date/Time Value Ref Range Status   01/02/2025 06:35 PM 8.7 See comment % Final   11/19/2024 02:06 PM 9 4.2 - 6.5 % Final   07/19/2024 08:52 AM 11.2 4.2 - 6.5 % Final     VLDL   Date/Time Value Ref Range Status   05/08/2023 09:35 AM 38 0 - 40 mg/dL Final   04/12/2021 09:06 AM 21 0 - 40 mg/dL Final   10/20/2020 12:36 PM 24 0 - 40 mg/dL Final      Last I/O:  I/O last 3 completed shifts:  In: 613.3 (6 mL/kg) [P.O.:480; IV Piggyback:133.3]  Out: - (0 mL/kg)   Weight: 102.1 kg     Past Cardiology Tests (Last 3 Years):    Echo:  Transthoracic Echo (TTE) Complete 01/25/2024     PHYSICIAN INTERPRETATION:  Left Ventricle: The left  ventricular systolic function is hyperdynamic, with an estimated ejection fraction of 70-75%. There are no regional wall motion abnormalities. The left ventricular cavity size is normal. The left ventricular septal wall thickness is mildly increased. There is mild concentric left ventricular hypertrophy. Spectral Doppler shows a normal pattern of left ventricular diastolic filling.  Left Atrium: The left atrium is mildly dilated.  Right Ventricle: The right ventricle was not well visualized. There is normal right ventricular global systolic function.  Right Atrium: The right atrium is normal in size.  Aortic Valve: The aortic valve is trileaflet. There is no evidence of aortic valve regurgitation. The peak instantaneous gradient of the aortic valve is 10.0 mmHg. The mean gradient of the aortic valve is 5.0 mmHg.  Mitral Valve: The mitral valve is normal in structure. There is trace to mild mitral valve regurgitation.  Tricuspid Valve: The tricuspid valve is structurally normal. There is trace to mild tricuspid regurgitation. The Doppler estimated RVSP is within normal limits at 32.4 mmHg.  Pulmonic Valve: The pulmonic valve is structurally normal. There is physiologic pulmonic valve regurgitation.  Pericardium: There is a trivial pericardial effusion.  Pleural: There is left pleural effusion.  Aorta: The aortic root is normal.    CONCLUSIONS:  1. Left ventricular systolic function is hyperdynamic with a 70-75% estimated ejection fraction.  2. RVSP within normal limits.    ** Final **    Transthoracic Echo (TTE) Complete 01/3/2025  CONCLUSIONS:   1. Left ventricular ejection fraction is normal, calculated by Argueta's biplane at 69%.   2. Spectral Doppler shows a Grade II (pseudonormal pattern) of left ventricular diastolic filling with an elevated left atrial pressure.   3. There is normal right ventricular global systolic function.   4. The left atrium is moderately dilated.   5. Moderate mitral valve  regurgitation.   6. Mechanism of MR Is possibly restricted motion of the posterior leaflet.   7. Moderate tricuspid regurgitation visualized.   8. Moderately elevated right ventricular systolic pressure.   9. Small pericardial effusion.  10. Compared with study dated 1/25/2024, the degree of MR appears moderate today rather than mild on prior study. TR is also worsened.    Inpatient Medications:  Scheduled medications   Medication Dose Route Frequency    amLODIPine  10 mg oral Daily    aspirin  81 mg oral Daily    atorvastatin  40 mg oral Nightly    capsaicin   Topical TID    folic acid  1 mg oral Daily    gabapentin  300 mg oral BID    heparin (porcine)  7,500 Units subcutaneous q8h NEHEMIAS    hydrALAZINE  50 mg oral TID    insulin glargine  18 Units subcutaneous Daily    insulin lispro  0-10 Units subcutaneous TID AC    iron sucrose  200 mg intravenous Daily    isosorbide dinitrate  10 mg oral TID    polyethylene glycol  17 g oral Daily    sennosides-docusate sodium  2 tablet oral BID    [Held by provider] torsemide  60 mg oral BID    vitamin B complex-vitamin C-folic acid  1 capsule oral Daily     PRN medications   Medication    acetaminophen    albuterol    bisacodyl    dextrose    dextrose    glucagon    glucagon    oxygen    oxygen     Continuous Medications   Medication Dose Last Rate     Physical Exam:  General: NAD, lying in bed  Skin: warm and dry throughout  Head/ neck: unable to appreciate JVD  Cardiac: RRR, S1S2S3  Pulm: diminished in BLL, on RA  GI: soft, nontender, non-distended, +BS  Extremities: trace non-pitting edema in BLE  Neuro: no focal neuro deficits, A&Ox4  Psych: appropriate mood and behavior, very pleasant       Assessment/Plan   Nuris Squires is a 59 y.o. female presenting with PMH of poorly controlled insulin dependent type 2 diabetes c/b neuropathy, hyperlipidemia, PAD, CKD stage IV, hypertension, multiple foot infections s/p partial R great toe amputation, fall w/L femur fracture s/p ORIF  (4/2024) that presents to the emergency department today for shortness of breath x1 day. Admitted under [facility]VI Service for further management of acute ADHF and HTN Urgency.     Acute diastolic heart failure  Chest tightness, resolved  - likely 2/2 uncontrolled HTN, c/f high-output HF   - TTE (1/2024): EF 70-75%  - TTE 1/3/2025: EF 69%, grade II diastolic filling, moderate MR, moderate TR, small pericardial effusion  - neg stress 10/2019  - CXR: pulm vascular congestion BL, small bibasilar pleural effusions  - Last BNP (1/5) 161 (376 on admit iso obesity)  - HS trop 21, no ischemic changes on ECG, no c/f ACS  - dry weight ~100kg  - admit weight: 107kg  - daily weight: 102, (100, 102,104, 108, 108, 107, 106 kg)  - RHC (1/8): reviewed tracings with Dr. Mckee, numbers are questionable but RVEDP 20, PA 75/25, unclear wedge  - of note, pharmacy verified patient on PO lasix 20mg daily at home (?for unclear reasons, presumably prescribed by PCP for leg swelling)-> per patient, was just started on it at home  - s/p successful diuresis w/IV lasix gtt at 15mg/hr and PRN metolazone  - 1/14 Hold Torsemide at this time per nephrology  - do NOT start empa iso GFR <20  - avoid ACEI/ARB/ARNI, MRA for now d/t rising Cr  - cont. Hydral 50mg TID, isordil 10mg TID  - HF navigator following, establishing care here  - Daily standing weights, strict I&O's, 2g sodium diet, 2L fluid restriction     CKD IV, worsening  Urinary retention  - Cr last year 1.7-5.3, worsening since 2022  - admit Cr 2.34  - daily Cr 4.28 (4.06x2, 3.89, 3.85, 3.61, 3.73)  - urine lytes, osmolality reviewed, FENa 5.6% suggesting intrinsic cause  - Nephrology consultation: Hold torsemide, recommend urology consult, agree with kidney and bladder US, maintain SBP >120  - 1/15 adhikari placed for acute urinary retention  - 1/15 renal US: Borderline increased renal cortical echogenicity bilaterally which may reflect medical renal disease, without evidence of  obstructive  1/15 UA no infection, + protein, +glucose  uropathy.  - Diuresis plan as above    Anemia of unclear origin  Paraesthesias, acute  - c/o paraesthesias around mouth & fingertips, new since admit  - unremarkable colonoscopy in 2019, rec'd to repeat in 7yrs for screening  - Baseline Hgb ~9-11  - Hgb today: 9.9 (8.9, 9.6, 11.1 on admit)  - irons studies: sat 33%, folate/ferritin/vit. B12 WNL--->cont. IV iron 200mg x5 days, transition to oral after  - no overt signs of bleeding  - capsaicin cream for hands  - cont. OP follow-up w/PCP (scheduled 2/20)    Acute hypoxic respiratory failure iso of ADHF exacerbation, improving  Pulmonary HTN  - p.ox initially at 61% but improved rapidly with placement of nasal cannula  - She was requiring 6 L of oxygen, BiPAP briefly due to the concern for SCAPE, received SL nitro and she was able to wean off of BiPAP  -  weaned from 2L O2 to RA, however, desats at night, c/f RENZO (see below)  - VQ scan (1/9; for pulm HTN given high PA on RHC): low probability of PE  - Flu A/B/RSV/COVID negative  - diuresis plan as above  - pulmonary toileting w/incentive spirometry     HTN urgency, resolved  - BP on on admit: 220/98  - SBP improved to 143 after resumption of home meds and s/p IVP Hydral 5mg x1  - last 24 hour SBP range:108-164  - BPs labile, clonidine weaned off on 1/5, intermittent headaches w/isordil (tolerating 10mg TID)  - 1/14 Resume Amlodipine    Obesity  Insulin dependent Type 2 diabetes, Poorly controlled  Neuropathy  - Follows with clinical pharmacy closely/PCP  - Hgb A1c 8.7% (improved from previous 11.9%)  - BMI: 41.94  - home regimen: Mounjaro 2.5mg weekly, basaglar 32 units every morning, admelog SS with meals  - c/w lantus 18 units every morning (decreased from 32U iso AM BG of 62) and SSI #2 while inpatient  - OARRS reviewed, cont. Home gabapentin 300mg BID      HLD  PAD  - c/w asa 81mg daily, atorva 40mg daily     C/f RENZO  - reported increased daytime fatigue  -  desats on RA in-house while sleeping  - has sleep medicine referral placed    Constipation  - cont. Bowel regimen  - encourage frequent ambulation       DVT ppx: subQ heparin  Emergency contacts: Boyfrienterrie Rousseau #[phone]  Brother Luis Squires #[phone]  DISPO: previously received HHC w/[facility]; likely resume at discharge   - HF navigator following, established care here  - discharge home pending stabilization of Cr          All labs, vital signs, tests & imaging results, and medications were reviewed.  Patient seen and discussed with Dr. Pizarro    Code Status:  Full Code    Delmy Bae, APRN-CNP

## 2025-01-15 NOTE — PROGRESS NOTES
NEPHROLOGY FOLLOW UP NOTE    Nuris Squires   59 y.o.      MRN/Room: [id number]    Subjective: Seen at  the bedside in no apparent distress no edema at this time .Creatinine still up trending 4.28 from 4.06. Stopped Torsemide yesterday afternoon received AM dose. Had difficulty urinating yesterday with no urinary output.  was found to be retaining approximately 1 L this morning. Voided soon after approximately 400 ml was found to have PVR of approximately 600 ml. Louis placed after this by primary team.     Upon further discussion patient notes that she has had chronic increased urination needs for a number of years. At baseline she uses the rest room every hour to hour and a half at home. She notes feeling like she always needs to urinate. She notes this not new for her and happened sometime after her 49th birthday. At baseline is not on any diuretic or any bladder treatments. Diabetic history includes peripheral neuropathy, nephropathy and retinopathy. Medical record shows evidence of recurrent uti with patient noting UTI in January, February, and November of 2024.  Significant history of uncontrolled diabetes with last Hemoglobin A1c 8.7 on January 2nd down from 11.9 on April 16th         Objective:          3 Day Weight Change: -0.633 kg (-1 lb 6.3 oz) per day       Physical Exam  HENT:      Mouth/Throat:      Comments: Mild dryness of mucous membranes found on exam.   Eyes:      Extraocular Movements: Extraocular movements intact.      Conjunctiva/sclera: Conjunctivae normal.   Cardiovascular:      Rate and Rhythm: Normal rate and regular rhythm.      Heart sounds: Normal heart sounds.   Pulmonary:      Effort: Pulmonary effort is normal.      Breath sounds: Normal breath sounds.   Abdominal:      General: There is no abdominal bruit.      Palpations: There is no fluid wave.      Tenderness: There is no abdominal tenderness. There is no right CVA tenderness, left CVA tenderness, guarding or rebound.       Comments: Mild selene-umbilical tenderness noted   Musculoskeletal:      Right lower leg: No edema.      Left lower leg: No edema.      Comments: No lower extremity edema noted. Previously had 1+ edema of RLE.    Skin:     General: Skin is warm and dry.   Neurological:      Mental Status: She is alert.      Comments: No asterixis noted            Meds:   amLODIPine, 10 mg, Daily  aspirin, 81 mg, Daily  atorvastatin, 40 mg, Nightly  capsaicin, , TID  folic acid, 1 mg, Daily  gabapentin, 300 mg, BID  heparin (porcine), 7,500 Units, q8h NEHEMIAS  hydrALAZINE, 50 mg, TID  insulin glargine, 18 Units, Daily  insulin lispro, 0-10 Units, TID AC  iron sucrose, 200 mg, Daily  isosorbide dinitrate, 10 mg, TID  polyethylene glycol, 17 g, Daily  sennosides-docusate sodium, 2 tablet, BID  [Held by provider] torsemide, 60 mg, BID  vitamin B complex-vitamin C-folic acid, 1 capsule, Daily         acetaminophen, 650 mg, q6h PRN  albuterol, 2.5 mg, q6h PRN  bisacodyl, 10 mg, Daily PRN  dextrose, 12.5 g, q15 min PRN  dextrose, 25 g, q15 min PRN  glucagon, 1 mg, q15 min PRN  glucagon, 1 mg, q15 min PRN  oxygen, , Continuous PRN - O2/gases  oxygen, , Continuous PRN - O2/gases        Vitals:    01/15/25 0814   BP: 164/86   Pulse: 78   Resp: 19   Temp: 35.9 °C (96.6 °F)   SpO2: 92%      Vitals:    01/14/25 0630   Weight: 100 kg (221 lb 1.9 oz)         Intake/Output Summary (Last 24 hours) at 1/15/2025 1146  Last data filed at 1/15/2025 0845  Gross per 24 hour   Intake 373.33 ml   Output 700 ml   Net -326.67 ml           Blood Labs:  Results for orders placed or performed during the hospital encounter of 01/02/25 (from the past 24 hours)   POCT GLUCOSE   Result Value Ref Range    POCT Glucose 265 (H) 74 - 99 mg/dL   POCT GLUCOSE   Result Value Ref Range    POCT Glucose 237 (H) 74 - 99 mg/dL   CBC   Result Value Ref Range    WBC 6.4 4.4 - 11.3 x10*3/uL    nRBC 0.0 0.0 - 0.0 /100 WBCs    RBC 3.16 (L) 4.00 - 5.20 x10*6/uL    Hemoglobin 9.7 (L)  12.0 - 16.0 g/dL    Hematocrit 30.3 (L) 36.0 - 46.0 %    MCV 96 80 - 100 fL    MCH 30.7 26.0 - 34.0 pg    MCHC 32.0 32.0 - 36.0 g/dL    RDW 14.1 11.5 - 14.5 %    Platelets 415 150 - 450 x10*3/uL   Magnesium   Result Value Ref Range    Magnesium 2.64 (H) 1.60 - 2.40 mg/dL   Renal function panel   Result Value Ref Range    Glucose 141 (H) 74 - 99 mg/dL    Sodium 140 136 - 145 mmol/L    Potassium 4.4 3.5 - 5.3 mmol/L    Chloride 98 98 - 107 mmol/L    Bicarbonate 29 21 - 32 mmol/L    Anion Gap 17 10 - 20 mmol/L    Urea Nitrogen 90 (H) 6 - 23 mg/dL    Creatinine 4.28 (H) 0.50 - 1.05 mg/dL    eGFR 11 (L) >60 mL/min/1.73m*2    Calcium 9.0 8.6 - 10.6 mg/dL    Phosphorus 5.8 (H) 2.5 - 4.9 mg/dL    Albumin 3.5 3.4 - 5.0 g/dL   POCT GLUCOSE   Result Value Ref Range    POCT Glucose 271 (H) 74 - 99 mg/dL   POCT GLUCOSE   Result Value Ref Range    POCT Glucose 182 (H) 74 - 99 mg/dL   Urea Nitrogen, Urine Random   Result Value Ref Range    Urea Nitrogen, Urine Random 422 mg/dL    Creatinine, Urine Random 61.7 20.0 - 320.0 mg/dL    Urea Nitrogen/Creatinine Ratio 6.8 Not established. g/g creat   Urinalysis with Reflex Culture and Microscopic   Result Value Ref Range    Color, Urine Light-Yellow Light-Yellow, Yellow, Dark-Yellow    Appearance, Urine Clear Clear    Specific Gravity, Urine 1.012 1.005 - 1.035    pH, Urine 6.5 5.0, 5.5, 6.0, 6.5, 7.0, 7.5, 8.0    Protein, Urine 200 (2+) (A) NEGATIVE, 10 (TRACE), 20 (TRACE) mg/dL    Glucose, Urine 100 (1+) (A) Normal mg/dL    Blood, Urine NEGATIVE NEGATIVE    Ketones, Urine NEGATIVE NEGATIVE mg/dL    Bilirubin, Urine NEGATIVE NEGATIVE    Urobilinogen, Urine Normal Normal mg/dL    Nitrite, Urine NEGATIVE NEGATIVE    Leukocyte Esterase, Urine NEGATIVE NEGATIVE   Urinalysis Microscopic   Result Value Ref Range    WBC, Urine 1-5 1-5, NONE /HPF    RBC, Urine 1-2 NONE, 1-2, 3-5 /HPF     *Note: Due to a large number of results and/or encounters for the requested time period, some results have  not been displayed. A complete set of results can be found in Results Review.          ASSESSMENT:  Nuris Squires is a  59 y.o.  Year old , with PMHx of  poorly controlled insulin dependent type 2 diabetes c/b neuropathy, hyperlipidemia, PAD, CKD stage IV, hypertension, multiple foot infections s/p partial R great toe amputation, fall w/L femur fracture s/p ORIF (4/2024) that presents to the emergency department for SOB. Admitted under HHVI Service for further management of acute ADHF and HTN Urgency.     Found to be retaining today with a PVR of approximately 600. After discussion with patient appears to be acute on chronic given patient hx consistent with chronic urinary retention. Most likely etiology of chronic retention neurogenic bladder secondary to DM II,  though other causes of chronic retention cannot be ruled out. Proposed mechanism of acute exacerbation likely 2/2 increased urine burden due to consistent diuretic use since admission leading to hydronephrosis. Patient currently has no home diuretic regimen. Mildly hypovolemic on exam with evidence of a contraction alkalosis which may be evidence of slight over diuresis, which could also be contributing to VENKAT.        #Normotensive ischemic injury with CRS   #VENKAT on Proteinuric CKD IV, nonoliguric, Stage II Liely 2/2 Diabetic Kidney disease   #Acute on Chronic Urinary retention with concern for hydronephrosis   #C/f neurogenic bladder 2/2 DMII   - Baseline creatinine: 1.68   - Etiology: Likely Multifactorial   - UA: was not completed   - Urine electrolyes showed FeNA of 5.4 % (1/8/25) Post renal >4 etiology.This was after multiple days of diuresis and is not definitive  - FeUrea: 32.5 % suggestive of pre-renal disease  - Clinical volume status: appears euvolemic to mildly hypervolemic  - Electrolytes (Na, K, Ca, ) are stable. Magnesium and Phos are both elevated  - Acid base status: Last Ph 7.32 by VBG on January 2nd   - Intake output 01/14: = + .373  L  +.373L: Net -0   - bicarb has been up trending was 22 on admission and 29 on 1/14     Recommendations:  - Check Urine Urea for FeUrea (pending)   - Maintain Louis Catheter  - Agree with Urinalysis   - Agree with Kidney US and Bladder US   - Recommend urologic evaluation for acute on chronic urinary retention    - Indication for dialysis:  No   - Avoid nephrotoxins, contrast if possible  - c/w Holding Torsemide  - Continue to maintain SBP over 120   - strict Is/Os  - Renal dosing for medications for latest eGFR, follow medication trough as appropriate  - Avoid hypotension/rapid fluctuations in BPs    Michael Farmer MD  Nephrology Resident  24 hour Renal Pager - [phone]    Discussed with attending nephrologist

## 2025-01-16 PROBLEM — N17.9 AKI (ACUTE KIDNEY INJURY) (CMS-HCC): Status: ACTIVE | Noted: 2025-01-02

## 2025-01-16 PROBLEM — N18.9 CHRONIC KIDNEY DISEASE: Status: ACTIVE | Noted: 2025-01-02

## 2025-01-16 LAB
ALBUMIN SERPL BCP-MCNC: 3.1 G/DL (ref 3.4–5)
ALBUMIN SERPL BCP-MCNC: 3.1 G/DL (ref 3.4–5)
ANION GAP SERPL CALC-SCNC: 15 MMOL/L (ref 10–20)
ANION GAP SERPL CALC-SCNC: 17 MMOL/L (ref 10–20)
BUN SERPL-MCNC: 95 MG/DL (ref 6–23)
BUN SERPL-MCNC: 97 MG/DL (ref 6–23)
CALCIUM SERPL-MCNC: 8.8 MG/DL (ref 8.6–10.6)
CALCIUM SERPL-MCNC: 8.8 MG/DL (ref 8.6–10.6)
CHLORIDE SERPL-SCNC: 100 MMOL/L (ref 98–107)
CHLORIDE SERPL-SCNC: 98 MMOL/L (ref 98–107)
CO2 SERPL-SCNC: 26 MMOL/L (ref 21–32)
CO2 SERPL-SCNC: 27 MMOL/L (ref 21–32)
CREAT SERPL-MCNC: 4.03 MG/DL (ref 0.5–1.05)
CREAT SERPL-MCNC: 4.4 MG/DL (ref 0.5–1.05)
EGFRCR SERPLBLD CKD-EPI 2021: 11 ML/MIN/1.73M*2
EGFRCR SERPLBLD CKD-EPI 2021: 12 ML/MIN/1.73M*2
ERYTHROCYTE [DISTWIDTH] IN BLOOD BY AUTOMATED COUNT: 14.5 % (ref 11.5–14.5)
GLUCOSE BLD MANUAL STRIP-MCNC: 135 MG/DL (ref 74–99)
GLUCOSE BLD MANUAL STRIP-MCNC: 192 MG/DL (ref 74–99)
GLUCOSE BLD MANUAL STRIP-MCNC: 210 MG/DL (ref 74–99)
GLUCOSE BLD MANUAL STRIP-MCNC: 244 MG/DL (ref 74–99)
GLUCOSE SERPL-MCNC: 174 MG/DL (ref 74–99)
GLUCOSE SERPL-MCNC: 268 MG/DL (ref 74–99)
HCT VFR BLD AUTO: 29.7 % (ref 36–46)
HGB BLD-MCNC: 9.2 G/DL (ref 12–16)
MAGNESIUM SERPL-MCNC: 2.6 MG/DL (ref 1.6–2.4)
MCH RBC QN AUTO: 30.1 PG (ref 26–34)
MCHC RBC AUTO-ENTMCNC: 31 G/DL (ref 32–36)
MCV RBC AUTO: 97 FL (ref 80–100)
NRBC BLD-RTO: 0 /100 WBCS (ref 0–0)
PHOSPHATE SERPL-MCNC: 5.7 MG/DL (ref 2.5–4.9)
PHOSPHATE SERPL-MCNC: 5.9 MG/DL (ref 2.5–4.9)
PLATELET # BLD AUTO: 397 X10*3/UL (ref 150–450)
POTASSIUM SERPL-SCNC: 4.2 MMOL/L (ref 3.5–5.3)
POTASSIUM SERPL-SCNC: 4.6 MMOL/L (ref 3.5–5.3)
RBC # BLD AUTO: 3.06 X10*6/UL (ref 4–5.2)
SODIUM SERPL-SCNC: 136 MMOL/L (ref 136–145)
SODIUM SERPL-SCNC: 138 MMOL/L (ref 136–145)
WBC # BLD AUTO: 7.5 X10*3/UL (ref 4.4–11.3)

## 2025-01-16 PROCEDURE — 36415 COLL VENOUS BLD VENIPUNCTURE: CPT | Performed by: NURSE PRACTITIONER

## 2025-01-16 PROCEDURE — 99232 SBSQ HOSP IP/OBS MODERATE 35: CPT

## 2025-01-16 PROCEDURE — 2500000002 HC RX 250 W HCPCS SELF ADMINISTERED DRUGS (ALT 637 FOR MEDICARE OP, ALT 636 FOR OP/ED): Performed by: NURSE PRACTITIONER

## 2025-01-16 PROCEDURE — 2500000002 HC RX 250 W HCPCS SELF ADMINISTERED DRUGS (ALT 637 FOR MEDICARE OP, ALT 636 FOR OP/ED): Performed by: STUDENT IN AN ORGANIZED HEALTH CARE EDUCATION/TRAINING PROGRAM

## 2025-01-16 PROCEDURE — 84100 ASSAY OF PHOSPHORUS: CPT | Performed by: NURSE PRACTITIONER

## 2025-01-16 PROCEDURE — 85027 COMPLETE CBC AUTOMATED: CPT | Performed by: NURSE PRACTITIONER

## 2025-01-16 PROCEDURE — 2500000001 HC RX 250 WO HCPCS SELF ADMINISTERED DRUGS (ALT 637 FOR MEDICARE OP): Performed by: STUDENT IN AN ORGANIZED HEALTH CARE EDUCATION/TRAINING PROGRAM

## 2025-01-16 PROCEDURE — 2500000004 HC RX 250 GENERAL PHARMACY W/ HCPCS (ALT 636 FOR OP/ED): Performed by: NURSE PRACTITIONER

## 2025-01-16 PROCEDURE — 2500000001 HC RX 250 WO HCPCS SELF ADMINISTERED DRUGS (ALT 637 FOR MEDICARE OP): Performed by: NURSE PRACTITIONER

## 2025-01-16 PROCEDURE — 83735 ASSAY OF MAGNESIUM: CPT | Performed by: NURSE PRACTITIONER

## 2025-01-16 PROCEDURE — 99233 SBSQ HOSP IP/OBS HIGH 50: CPT | Performed by: INTERNAL MEDICINE

## 2025-01-16 PROCEDURE — 2500000001 HC RX 250 WO HCPCS SELF ADMINISTERED DRUGS (ALT 637 FOR MEDICARE OP)

## 2025-01-16 PROCEDURE — 82947 ASSAY GLUCOSE BLOOD QUANT: CPT

## 2025-01-16 PROCEDURE — 1200000002 HC GENERAL ROOM WITH TELEMETRY DAILY

## 2025-01-16 PROCEDURE — 80069 RENAL FUNCTION PANEL: CPT | Performed by: NURSE PRACTITIONER

## 2025-01-16 RX ORDER — FERROUS SULFATE 325(65) MG
65 TABLET ORAL
Status: DISCONTINUED | OUTPATIENT
Start: 2025-01-17 | End: 2025-01-23 | Stop reason: HOSPADM

## 2025-01-16 RX ORDER — OXYBUTYNIN CHLORIDE 5 MG/1
5 TABLET ORAL 3 TIMES DAILY
Status: DISCONTINUED | OUTPATIENT
Start: 2025-01-16 | End: 2025-01-23 | Stop reason: HOSPADM

## 2025-01-16 RX ORDER — OXYBUTYNIN CHLORIDE 5 MG/1
5 TABLET, EXTENDED RELEASE ORAL NIGHTLY
Status: DISCONTINUED | OUTPATIENT
Start: 2025-01-16 | End: 2025-01-16

## 2025-01-16 RX ADMIN — FOLIC ACID 1 MG: 1 TABLET ORAL at 08:56

## 2025-01-16 RX ADMIN — IRON SUCROSE 200 MG: 20 INJECTION, SOLUTION INTRAVENOUS at 06:22

## 2025-01-16 RX ADMIN — OXYBUTYNIN CHLORIDE 5 MG: 5 TABLET ORAL at 17:03

## 2025-01-16 RX ADMIN — OXYBUTYNIN CHLORIDE 5 MG: 5 TABLET ORAL at 21:24

## 2025-01-16 RX ADMIN — ISOSORBIDE DINITRATE 10 MG: 10 TABLET ORAL at 08:55

## 2025-01-16 RX ADMIN — GABAPENTIN 300 MG: 300 CAPSULE ORAL at 08:56

## 2025-01-16 RX ADMIN — SODIUM CHLORIDE 500 ML: 9 INJECTION, SOLUTION INTRAVENOUS at 12:09

## 2025-01-16 RX ADMIN — INSULIN GLARGINE 18 UNITS: 100 INJECTION, SOLUTION SUBCUTANEOUS at 09:00

## 2025-01-16 RX ADMIN — INSULIN LISPRO 4 UNITS: 100 INJECTION, SOLUTION INTRAVENOUS; SUBCUTANEOUS at 08:56

## 2025-01-16 RX ADMIN — ISOSORBIDE DINITRATE 10 MG: 10 TABLET ORAL at 13:54

## 2025-01-16 RX ADMIN — NEPHROCAP 1 CAPSULE: 1 CAP ORAL at 08:56

## 2025-01-16 RX ADMIN — ASPIRIN 81 MG: 81 TABLET, CHEWABLE ORAL at 08:56

## 2025-01-16 RX ADMIN — HEPARIN SODIUM 7500 UNITS: 5000 INJECTION INTRAVENOUS; SUBCUTANEOUS at 06:23

## 2025-01-16 RX ADMIN — ISOSORBIDE DINITRATE 10 MG: 10 TABLET ORAL at 21:29

## 2025-01-16 RX ADMIN — HEPARIN SODIUM 7500 UNITS: 5000 INJECTION INTRAVENOUS; SUBCUTANEOUS at 21:24

## 2025-01-16 RX ADMIN — ATORVASTATIN CALCIUM 40 MG: 40 TABLET, FILM COATED ORAL at 21:24

## 2025-01-16 RX ADMIN — HYDRALAZINE HYDROCHLORIDE 50 MG: 50 TABLET ORAL at 08:56

## 2025-01-16 RX ADMIN — HYDRALAZINE HYDROCHLORIDE 50 MG: 50 TABLET ORAL at 13:54

## 2025-01-16 RX ADMIN — HYDRALAZINE HYDROCHLORIDE 50 MG: 50 TABLET ORAL at 21:24

## 2025-01-16 RX ADMIN — AMLODIPINE BESYLATE 10 MG: 10 TABLET ORAL at 08:56

## 2025-01-16 RX ADMIN — GABAPENTIN 300 MG: 300 CAPSULE ORAL at 21:24

## 2025-01-16 RX ADMIN — HEPARIN SODIUM 7500 UNITS: 5000 INJECTION INTRAVENOUS; SUBCUTANEOUS at 13:53

## 2025-01-16 ASSESSMENT — PAIN SCALES - GENERAL: PAINLEVEL_OUTOF10: 0 - NO PAIN

## 2025-01-16 ASSESSMENT — COGNITIVE AND FUNCTIONAL STATUS - GENERAL
MOVING TO AND FROM BED TO CHAIR: A LITTLE
DAILY ACTIVITIY SCORE: 19
TOILETING: A LITTLE
HELP NEEDED FOR BATHING: A LITTLE
PERSONAL GROOMING: A LITTLE
STANDING UP FROM CHAIR USING ARMS: A LITTLE
MOBILITY SCORE: 20
WALKING IN HOSPITAL ROOM: A LITTLE
HELP NEEDED FOR BATHING: A LITTLE
DRESSING REGULAR UPPER BODY CLOTHING: A LITTLE
CLIMB 3 TO 5 STEPS WITH RAILING: A LITTLE
STANDING UP FROM CHAIR USING ARMS: A LITTLE
MOBILITY SCORE: 20
DAILY ACTIVITIY SCORE: 19
DRESSING REGULAR UPPER BODY CLOTHING: A LITTLE
STANDING UP FROM CHAIR USING ARMS: A LITTLE
DRESSING REGULAR LOWER BODY CLOTHING: A LITTLE
MOVING TO AND FROM BED TO CHAIR: A LITTLE
DRESSING REGULAR UPPER BODY CLOTHING: A LITTLE
DAILY ACTIVITIY SCORE: 19
DRESSING REGULAR LOWER BODY CLOTHING: A LITTLE
MOVING TO AND FROM BED TO CHAIR: A LITTLE
WALKING IN HOSPITAL ROOM: A LITTLE
PERSONAL GROOMING: A LITTLE
PERSONAL GROOMING: A LITTLE
CLIMB 3 TO 5 STEPS WITH RAILING: A LITTLE
CLIMB 3 TO 5 STEPS WITH RAILING: A LITTLE
MOBILITY SCORE: 20
HELP NEEDED FOR BATHING: A LITTLE
DRESSING REGULAR LOWER BODY CLOTHING: A LITTLE
WALKING IN HOSPITAL ROOM: A LITTLE

## 2025-01-16 ASSESSMENT — PAIN - FUNCTIONAL ASSESSMENT: PAIN_FUNCTIONAL_ASSESSMENT: 0-10

## 2025-01-16 NOTE — PROGRESS NOTES
Subjective Data:   Patient slightly disappointed her kidney function has worsened. Denies CP or SOB. On room air. Weight stable. 2100 out after adhikari placement.      Overnight Events:  No acute events overnight.      Objective Data:  Last Recorded Vitals:  Vitals          Vitals:     01/15/25 2300 01/16/25 0607 01/16/25 0822 01/16/25 1142   BP: 110/67 121/71 147/82 111/66   BP Location: Left arm Left arm Left arm Left arm   Patient Position: Lying Lying Lying Lying   Pulse: 74 71       Resp: 14 16       Temp: 36.3 °C (97.3 °F) 36.2 °C (97.2 °F) 36 °C (96.8 °F) 36.4 °C (97.5 °F)   TempSrc: Temporal Temporal Temporal Temporal   SpO2: 93% 92% 94% 92%   Weight:   102 kg (225 lb 12 oz)       Height:                 Last Labs:  CBC - 1/15/2025:  7:19 PM  5.9 9.4 371    32.2       CMP - 1/16/2025:  9:53 AM  8.8 6.9 14 --- 0.4   5.9 3.1 12 114       PTT - 4/15/2024:  9:28 PM      0.9   10.3 31              TROPHS   Date/Time Value Ref Range Status   01/02/2025 12:53 PM 21 0 - 34 ng/L Final   02/03/2024 01:22 AM 12 0 - 34 ng/L Final   01/24/2024 08:32 PM 25 0 - 34 ng/L Final   01/25/2023 04:58 PM 7 0 - 34 ng/L Final       Comment:       .  Less than 99th percentile of normal range cutoff-  Female and children under 18 years old <35 ng/L; Male <54 ng/L: Negative  Repeat testing should be performed if clinically indicated.   .  Female and children under 18 years old  ng/L; Male  ng/L:  Consistent with possible cardiac damage and possible increased clinical   risk. Serial measurements may help to assess extent of myocardial damage.   .  >120 ng/L: Consistent with cardiac damage, increased clinical risk and  myocardial infarction. Serial measurements may help assess extent of   myocardial damage.   .   NOTE: Children less than 1 year old may have higher baseline troponin   levels and results should be interpreted in conjunction with the overall   clinical context.  .  NOTE: Troponin I testing is performed using a  different   testing methodology at [facility] than at other   [facility]. Direct result comparisons should only   be made within the same method.      01/25/2023 04:16 PM 8 0 - 34 ng/L Final       Comment:       .  Less than 99th percentile of normal range cutoff-  Female and children under 18 years old <35 ng/L; Male <54 ng/L: Negative  Repeat testing should be performed if clinically indicated.   .  Female and children under 18 years old  ng/L; Male  ng/L:  Consistent with possible cardiac damage and possible increased clinical   risk. Serial measurements may help to assess extent of myocardial damage.   .  >120 ng/L: Consistent with cardiac damage, increased clinical risk and  myocardial infarction. Serial measurements may help assess extent of   myocardial damage.   .   NOTE: Children less than 1 year old may have higher baseline troponin   levels and results should be interpreted in conjunction with the overall   clinical context.  .  NOTE: Troponin I testing is performed using a different   testing methodology at [facility] than at other   [facility]. Direct result comparisons should only   be made within the same method.               BNP   Date/Time Value Ref Range Status   01/04/2025 09:19  0 - 99 pg/mL Final   01/02/2025 12:53  0 - 99 pg/mL Final            HGBA1C   Date/Time Value Ref Range Status   01/02/2025 06:35 PM 8.7 See comment % Final   11/19/2024 02:06 PM 9 4.2 - 6.5 % Final   07/19/2024 08:52 AM 11.2 4.2 - 6.5 % Final            VLDL   Date/Time Value Ref Range Status   05/08/2023 09:35 AM 38 0 - 40 mg/dL Final   04/12/2021 09:06 AM 21 0 - 40 mg/dL Final   10/20/2020 12:36 PM 24 0 - 40 mg/dL Final      Last I/O:  I/O last 3 completed shifts:  In: 133.3 (1.3 mL/kg) [IV Piggyback:133.3]  Out: 2100 (20.5 mL/kg) [Urine:2100 (0.6 mL/kg/hr)]  Weight: 102.4 kg      Past Cardiology Tests (Last 3 Years):     Echo:  Transthoracic Echo (TTE)  Complete 01/25/2024     PHYSICIAN INTERPRETATION:  Left Ventricle: The left ventricular systolic function is hyperdynamic, with an estimated ejection fraction of 70-75%. There are no regional wall motion abnormalities. The left ventricular cavity size is normal. The left ventricular septal wall thickness is mildly increased. There is mild concentric left ventricular hypertrophy. Spectral Doppler shows a normal pattern of left ventricular diastolic filling.  Left Atrium: The left atrium is mildly dilated.  Right Ventricle: The right ventricle was not well visualized. There is normal right ventricular global systolic function.  Right Atrium: The right atrium is normal in size.  Aortic Valve: The aortic valve is trileaflet. There is no evidence of aortic valve regurgitation. The peak instantaneous gradient of the aortic valve is 10.0 mmHg. The mean gradient of the aortic valve is 5.0 mmHg.  Mitral Valve: The mitral valve is normal in structure. There is trace to mild mitral valve regurgitation.  Tricuspid Valve: The tricuspid valve is structurally normal. There is trace to mild tricuspid regurgitation. The Doppler estimated RVSP is within normal limits at 32.4 mmHg.  Pulmonic Valve: The pulmonic valve is structurally normal. There is physiologic pulmonic valve regurgitation.  Pericardium: There is a trivial pericardial effusion.  Pleural: There is left pleural effusion.  Aorta: The aortic root is normal.     CONCLUSIONS:  1. Left ventricular systolic function is hyperdynamic with a 70-75% estimated ejection fraction.  2. RVSP within normal limits.     ** Final **     Transthoracic Echo (TTE) Complete 01/3/2025  CONCLUSIONS:   1. Left ventricular ejection fraction is normal, calculated by Argueta's biplane at 69%.   2. Spectral Doppler shows a Grade II (pseudonormal pattern) of left ventricular diastolic filling with an elevated left atrial pressure.   3. There is normal right ventricular global systolic function.   4.  The left atrium is moderately dilated.   5. Moderate mitral valve regurgitation.   6. Mechanism of MR Is possibly restricted motion of the posterior leaflet.   7. Moderate tricuspid regurgitation visualized.   8. Moderately elevated right ventricular systolic pressure.   9. Small pericardial effusion.  10. Compared with study dated 1/25/2024, the degree of MR appears moderate today rather than mild on prior study. TR is also worsened.     Inpatient Medications:  Scheduled Medications          Scheduled medications   Medication Dose Route Frequency    amLODIPine  10 mg oral Daily    aspirin  81 mg oral Daily    atorvastatin  40 mg oral Nightly    capsaicin   Topical TID    folic acid  1 mg oral Daily    gabapentin  300 mg oral BID    heparin (porcine)  7,500 Units subcutaneous q8h NEHEMIAS    hydrALAZINE  50 mg oral TID    insulin glargine  18 Units subcutaneous Daily    insulin lispro  0-10 Units subcutaneous TID AC    iron sucrose  200 mg intravenous Daily    isosorbide dinitrate  10 mg oral TID    polyethylene glycol  17 g oral Daily    sennosides-docusate sodium  2 tablet oral BID    sodium chloride  500 mL intravenous Once    [Held by provider] torsemide  60 mg oral BID    vitamin B complex-vitamin C-folic acid  1 capsule oral Daily         PRN Medications       PRN medications   Medication    acetaminophen    albuterol    bisacodyl    dextrose    dextrose    glucagon    glucagon    oxygen    oxygen         Continuous Medications        Continuous Medications   Medication Dose Last Rate         Physical Exam:  General: NAD, lying in bed  Skin: warm and dry throughout  Head/ neck: unable to appreciate JVD  Cardiac: RRR, S1S2S3  Pulm: crackles left posterior base, diminished in BLL, on RA  GI: soft, nontender, non-distended, +BS  Extremities: trace non-pitting edema in BLE  Neuro: no focal neuro deficits, A&Ox4  Psych: appropriate mood and behavior, very pleasant           Assessment/Plan  Nuris Squires is a 59 y.o.  female presenting with PMH of poorly controlled insulin dependent type 2 diabetes c/b neuropathy, hyperlipidemia, PAD, CKD stage IV, hypertension, multiple foot infections s/p partial R great toe amputation, fall w/L femur fracture s/p ORIF (4/2024) that presents to the emergency department today for shortness of breath x1 day. Admitted under HHVI Service for further management of acute ADHF and HTN Urgency.     Acute diastolic heart failure  Chest tightness, resolved  - likely 2/2 uncontrolled HTN, c/f high-output HF   - TTE (1/2024): EF 70-75%  - TTE 1/3/2025: EF 69%, grade II diastolic filling, moderate MR, moderate TR, small pericardial effusion  - neg stress 10/2019  - CXR: pulm vascular congestion BL, small bibasilar pleural effusions  - Last BNP (1/5) 161 (376 on admit iso obesity)  - HS trop 21, no ischemic changes on ECG, no c/f ACS  - dry weight ~100kg  - admit weight: 107kg  - daily weight: 102, (102, 100, 102,104, 108, 108, 107, 106 kg)  - RHC (1/8): reviewed tracings with Dr. Mckee, numbers are questionable but RVEDP 20, PA 75/25, unclear wedge  - of note, pharmacy verified patient on PO lasix 20mg daily at home (?for unclear reasons, presumably prescribed by PCP for leg swelling)-> per patient, was just started on it at home  - s/p successful diuresis w/IV lasix gtt at 15mg/hr and PRN metolazone  - 1/14 Hold Torsemide at this time per nephrology  - do NOT start empa iso GFR <20  - avoid ACEI/ARB/ARNI, MRA for now d/t rising Cr  - cont. Hydral 50mg TID, isordil 10mg TID  - HF navigator following, establishing care here  - Daily standing weights, strict I&O's, 2g sodium diet, 2L fluid restriction      CKD IV, worsening  Urinary retention  - Cr last year 1.7-5.3, worsening since 2022  - admit Cr 2.34  - daily Cr 4.40 (4.28, 4.06x2, 3.89, 3.85, 3.61, 3.73)  - urine lytes, osmolality reviewed, FENa 5.6% suggesting intrinsic cause  - Nephrology consultation: Hold torsemide, recommend urology consult,  agree with kidney and bladder US, maintain SBP >120. Elevation due to aggressive diuresis, aggressive BP control, and rising bicarb/contraction alkalosis indicates her being dry.   - 1/15 adhikari placed for acute urinary retention  - 1/15 renal US: Borderline increased renal cortical echogenicity bilaterally which may reflect medical renal disease, without evidence of obstructive, bladder decompressed by adhikari, no acute process   1/15 UA no infection, + protein, +glucose  uropathy.  - 1/16 urology consult, awaiting note  - 1/16 due to increased BUN/Cr give 500cc NS bolus. Recheck renal panel this pm.   - 1/16 RHC tomorrow due to rising Cr and reassess volume status, NPO ordered  - Diuresis plan as above     Anemia of unclear origin  Paraesthesias, acute  - c/o paraesthesias around mouth & fingertips, new since admit  - unremarkable colonoscopy in 2019, rec'd to repeat in 7yrs for screening  - Baseline Hgb ~9-11  - Hgb today: 9.4 (9.9, 8.9, 9.6, 11.1 on admit)  - irons studies: sat 33%, folate/ferritin/vit. B12 WNL--->cont. IV iron 200mg x5 days (until 1/16), transition to oral after (ordered)  - no overt signs of bleeding  - capsaicin cream for hands  - cont. OP follow-up w/PCP (scheduled 2/20)     Acute hypoxic respiratory failure iso of ADHF exacerbation, improving  Pulmonary HTN  - p.ox initially at 61% but improved rapidly with placement of nasal cannula  - She was requiring 6 L of oxygen, BiPAP briefly due to the concern for SCAPE, received SL nitro and she was able to wean off of BiPAP  -  weaned from 2L O2 to RA, however, desats at night, c/f RENZO (see below)  - VQ scan (1/9; for pulm HTN given high PA on RHC): low probability of PE  - Flu A/B/RSV/COVID negative  - diuresis plan as above  - pulmonary toileting w/incentive spirometry      HTN urgency, resolved  - BP on on admit: 220/98  - SBP improved to 143 after resumption of home meds and s/p IVP Hydral 5mg x1  - last 24 hour SBP range:108-164  - BPs labile,  clonidine weaned off on 1/5, intermittent headaches w/isordil (tolerating 10mg TID)  - 1/14 Resume Amlodipine  - 1/16 nephrology requesting permissive hypertension to help with perfusion     Obesity  Insulin dependent Type 2 diabetes, Poorly controlled  Neuropathy  - Follows with clinical pharmacy closely/PCP  - Hgb A1c 8.7% (improved from previous 11.9%)  - BMI: 41.94  - home regimen: Mounjaro 2.5mg weekly, basaglar 32 units every morning, admelog SS with meals  - c/w lantus 18 units every morning (decreased from 32U iso AM BG of 62) and SSI #2 while inpatient  - OARRS reviewed, cont. Home gabapentin 300mg BID      HLD  PAD  - c/w asa 81mg daily, atorva 40mg daily      C/f RENZO  - reported increased daytime fatigue  - desats on RA in-house while sleeping  - has sleep medicine referral placed     Constipation  - cont. Bowel regimen  - encourage frequent ambulation         DVT ppx: subQ heparin  Emergency contacts: Boyfrienterrie Rousseau #[phone]  Brother Luis Squires #[phone]  DISPO: previously received HHC w/[facility]; likely resume at discharge   - HF navigator following, established care here  - discharge home pending stabilization of Cr     All labs, vital signs, tests & imaging results, and medications were reviewed.  Patient seen and discussed with Dr. Pizarro     Code Status:  Full Code     Delmy Bae, APRN-CNP             Attestation signed by Francisco Pizarro MD at 1/16/2025 11:19 PM     This is a shared visit. I have reviewed the Advanced Practice Provider's encounter note, approve the Advanced Practice Provider's documentation, and provide the following additional information from my personal encounter.      Please see my separate note from 1/16.     Francisco Pizarro MD

## 2025-01-16 NOTE — PROGRESS NOTES
NEPHROLOGY FOLLOW UP NOTE    Nuris Squires   59 y.o.      MRN/Room: [id number]    Subjective: Seen at the bedside in no distress. Adhikari placed yesterday has had significant output. No fluid intake measurements for this patient.       Objective:   No intake information from January 15th. Had a significant drop in BP from 164/86 to 108/61 on the morning of January 15th, could be indicative of a new ischemic event.     Blood sugars have been between 224 and 264 for the last 24 hours     Significant post adhikari urine output of 2100 ml of fluid.    3 Day Weight Change: 0.033 kg (1.2 oz) per day       Physical Exam  HENT:      Head: Normocephalic and atraumatic.      Mouth/Throat:      Comments: Mild dryness of mucous membranes found on exam.   Eyes:      Extraocular Movements: Extraocular movements intact.      Conjunctiva/sclera: Conjunctivae normal.   Cardiovascular:      Rate and Rhythm: Normal rate and regular rhythm.      Heart sounds: Normal heart sounds.   Pulmonary:      Effort: Pulmonary effort is normal.      Breath sounds: Normal breath sounds.   Abdominal:      General: There is no abdominal bruit.      Palpations: There is no fluid wave.      Tenderness: There is no abdominal tenderness. There is no right CVA tenderness, left CVA tenderness, guarding or rebound.      Comments: Mild selene-umbilical tenderness noted   Musculoskeletal:      Right lower leg: No edema.      Left lower leg: No edema.      Comments: No lower extremity edema noted. Previously had 1+ edema of RLE.    Skin:     General: Skin is warm and dry.   Neurological:      Mental Status: She is alert.      Comments: No asterixis noted              Meds:   amLODIPine, 10 mg, Daily  aspirin, 81 mg, Daily  atorvastatin, 40 mg, Nightly  capsaicin, , TID  folic acid, 1 mg, Daily  gabapentin, 300 mg, BID  heparin (porcine), 7,500 Units, q8h NEHEMIAS  hydrALAZINE, 50 mg, TID  insulin glargine, 18 Units, Daily  insulin lispro, 0-10 Units, TID  AC  iron sucrose, 200 mg, Daily  isosorbide dinitrate, 10 mg, TID  polyethylene glycol, 17 g, Daily  sennosides-docusate sodium, 2 tablet, BID  sodium chloride, 500 mL, Once  [Held by provider] torsemide, 60 mg, BID  vitamin B complex-vitamin C-folic acid, 1 capsule, Daily         acetaminophen, 650 mg, q6h PRN  albuterol, 2.5 mg, q6h PRN  bisacodyl, 10 mg, Daily PRN  dextrose, 12.5 g, q15 min PRN  dextrose, 25 g, q15 min PRN  glucagon, 1 mg, q15 min PRN  glucagon, 1 mg, q15 min PRN  oxygen, , Continuous PRN - O2/gases  oxygen, , Continuous PRN - O2/gases        Vitals:    01/16/25 1142   BP: 111/66   Pulse:    Resp:    Temp: 36.4 °C (97.5 °F)   SpO2: 92%      Vitals:    01/14/25 0630   Weight: 100 kg (221 lb 1.9 oz)         Intake/Output Summary (Last 24 hours) at 1/16/2025 1342  Last data filed at 1/16/2025 1000  Gross per 24 hour   Intake 240 ml   Output 1400 ml   Net -1160 ml           Blood Labs:  Results for orders placed or performed during the hospital encounter of 01/02/25 (from the past 24 hours)   POCT GLUCOSE   Result Value Ref Range    POCT Glucose 264 (H) 74 - 99 mg/dL   CBC   Result Value Ref Range    WBC 5.9 4.4 - 11.3 x10*3/uL    nRBC 0.0 0.0 - 0.0 /100 WBCs    RBC 3.04 (L) 4.00 - 5.20 x10*6/uL    Hemoglobin 9.4 (L) 12.0 - 16.0 g/dL    Hematocrit 32.2 (L) 36.0 - 46.0 %     (H) 80 - 100 fL    MCH 30.9 26.0 - 34.0 pg    MCHC 29.2 (L) 32.0 - 36.0 g/dL    RDW 14.3 11.5 - 14.5 %    Platelets 371 150 - 450 x10*3/uL   Magnesium   Result Value Ref Range    Magnesium 2.64 (H) 1.60 - 2.40 mg/dL   Renal function panel   Result Value Ref Range    Glucose 276 (H) 74 - 99 mg/dL    Sodium 138 136 - 145 mmol/L    Potassium 4.5 3.5 - 5.3 mmol/L    Chloride 99 98 - 107 mmol/L    Bicarbonate 27 21 - 32 mmol/L    Anion Gap 17 10 - 20 mmol/L    Urea Nitrogen 91 (HH) 6 - 23 mg/dL    Creatinine 4.27 (H) 0.50 - 1.05 mg/dL    eGFR 11 (L) >60 mL/min/1.73m*2    Calcium 8.7 8.6 - 10.6 mg/dL    Phosphorus 5.9 (H) 2.5 -  4.9 mg/dL    Albumin 3.3 (L) 3.4 - 5.0 g/dL   POCT GLUCOSE   Result Value Ref Range    POCT Glucose 259 (H) 74 - 99 mg/dL   POCT GLUCOSE   Result Value Ref Range    POCT Glucose 244 (H) 74 - 99 mg/dL   Renal Function Panel   Result Value Ref Range    Glucose 268 (H) 74 - 99 mg/dL    Sodium 138 136 - 145 mmol/L    Potassium 4.6 3.5 - 5.3 mmol/L    Chloride 100 98 - 107 mmol/L    Bicarbonate 26 21 - 32 mmol/L    Anion Gap 17 10 - 20 mmol/L    Urea Nitrogen 95 (HH) 6 - 23 mg/dL    Creatinine 4.40 (H) 0.50 - 1.05 mg/dL    eGFR 11 (L) >60 mL/min/1.73m*2    Calcium 8.8 8.6 - 10.6 mg/dL    Phosphorus 5.9 (H) 2.5 - 4.9 mg/dL    Albumin 3.1 (L) 3.4 - 5.0 g/dL   POCT GLUCOSE   Result Value Ref Range    POCT Glucose 192 (H) 74 - 99 mg/dL     *Note: Due to a large number of results and/or encounters for the requested time period, some results have not been displayed. A complete set of results can be found in Results Review.          ASSESSMENT:  Nuris Squires is a  59 y.o.  Year old , with PMHx of  poorly controlled insulin dependent type 2 diabetes c/b neuropathy, hyperlipidemia, PAD, CKD stage IV, hypertension, multiple foot infections s/p partial R great toe amputation, fall w/L femur fracture s/p ORIF (4/2024) that presents to the emergency department for SOB. Admitted under [facility] Service for further management of acute ADHF and HTN Urgency.     Significant post adhikari urine output of 2100 ml of fluid. May involve components of post-obstructive diuresis, may also include osmotic diuresis 2/2 glucosuria  1+ glucose found in urine and blood sugars in the 220's to 240's for the past 24 hours. Still appeared hypovolemic on exam without intake volume from the 21st it is difficult to evaluate any changes in her volume status. Creatinine increased from 4.27 to 4.40 today with multiple possible etiologies for this increase. Also had significant BP fluctuation from 164/86 to 108/61 on the morning of january 15th, which could have  caused new ischemic insult. No hydronephrosis was noted on Renal US, nevertheless significant retention can still cause kidney injury prior to progressing to hydronephrosis. Patients continued creatinine rise likely multifactorial 2/2 hypovolemia, urinary retention and ischemic injury from blood pressure fluctuations.        #Normotensive ischemic injury with CRS   #VENKAT on Proteinuric CKD IV, nonoliguric, Stage II Liely 2/2 Diabetic Kidney disease   #Acute on Chronic Urinary retention   #C/f neurogenic bladder 2/2 DMII   - Baseline creatinine: 1.68   - Etiology: Likely Multifactorial (including urinary retention and Normotensive ischemic injury)   - UA: was not completed   - Urine electrolyes showed FeNA of 5.4 % (1/8/25) Post renal >4 etiology.This was after multiple days of diuresis and is not definitive  - FeUrea: 32.5 % suggestive of pre-renal disease  - Clinical volume status: hypervolemic  - Electrolytes (Na, K, Ca, ) are stable. Magnesium and Phos remain elevated  - Acid base status: Last Ph 7.32 by VBG on January 2nd   - No intake information noted for January 15th 2100 ml of output through adhikari   - bicarb has been up trending was 22 on admission and 29 on 1/14     Recommendations:  - Maintain Adhikari Catheter  - Decrease amlodipine from 10 mg to 5mg a day   - Encourage salt and fluid intake 2/2 her hypovolemia   - Agree with Kidney US and Bladder US   - Recommend urologic evaluation for acute on chronic urinary retention    - Indication for dialysis:  No   - Avoid nephrotoxins, contrast if possible  - c/w Holding Torsemide  - Continue to maintain SBP over 120, and avoid large fluctuations in BP. SBP's in the 140's are ok for now   - strict Is/Os to evaluate patient's fluid status   - Renal dosing for medications for latest eGFR, follow medication trough as appropriate  - Avoid hypotension/rapid fluctuations in BPs    Michael Farmer MD  Nephrology Resident  24 hour Renal Pager - [phone]    Discussed with  attending nephrologist

## 2025-01-16 NOTE — CONSULTS
Reason For Consult  Acute Urinary Retention     History Of Present Illness  Nuris Squires is a 59 y.o. female w/ PMH significant for uncontrolled DM2 c/b neuropathy, HLD, PAD, CKD, HTN, multiple prior foot infections who presented for dyspnea and management of acute diastolic heart failure. Patient developed urinary retention throughout hospital stay and a adhikari catheter was inserted 1/15. A renal bladder US was completed showing increased renal cortical echogenicity without hydronephrosis or obstructive process. UA negative for LE/Nitrites, positive for glucose and proteins. Labs significant for uprising Cr 4.4 from 4.2. Patient endorses suprapubic pressure and reports previous episodes of urinary retention. Denies dysuria, abdominal pain, N/V, fever, chills, chest pain, or SOB.      Past Medical History  She has a past medical history of Personal history of other diseases of the circulatory system, Personal history of other endocrine, nutritional and metabolic disease, and Personal history of other endocrine, nutritional and metabolic disease.    Surgical History  She has a past surgical history that includes Other surgical history (10/21/2020); CT angio head w and wo IV contrast (1/25/2023); CT angio neck (1/25/2023); and Cardiac catheterization (N/A, 1/8/2025).     Social History  She reports that she has never smoked. She has never been exposed to tobacco smoke. She has never used smokeless tobacco. She reports current alcohol use. She reports that she does not use drugs.    Family History  Family History   Problem Relation Name Age of Onset    Alzheimer's disease Mother      Diabetes Mother      Lung cancer Mother      Diabetes Father      Lung cancer Father      Pancreatic cancer Father      Diabetes Sister      Lung cancer Sister          Allergies  Patient has no known allergies.    Review of Systems  None other than HPI     Physical Exam  General: resting comfortably in bed  CV: regular rate and  "rhythm  Pulm: nonlabored breathing on room air, no conversational dyspnea  Abd: soft, nondistended, nontender  : adhikari catheter draining clear yellow urine  MSK: MCGRAW, no gross deformities  Psych: mood and behavior normal     Last Recorded Vitals  Blood pressure 111/66, pulse 71, temperature 36.4 °C (97.5 °F), temperature source Temporal, resp. rate 16, height 1.575 m (5' 2\"), weight 102 kg (225 lb 12 oz), SpO2 92%.    Relevant Results      Renal US 1/15:  Borderline increased renal cortical echogenicity bilaterally which  may reflect medical renal disease, without evidence of obstructive  uropathy.     Assessment/Plan   Nuris Squires is a 59 y.o. female w/ PMH significant for uncontrolled DM2 c/b neuropathy, HLD, PAD, CKD, HTN, multiple prior foot infections who presented for dyspnea and management of acute diastolic heart failure. Urology team consulted for evaluation and management recommendations for urinary retention. Patient developed urinary retention throughout hospital stay and a adhikari catheter was inserted 1/15. A renal bladder US was completed showing increased renal cortical echogenicity without hydronephrosis or obstructive process. UA negative for LE/Nitrites, positive for glucose and proteins. Labs significant for uprising Cr 4.4 from 4.2.     Suspect acute on chronic urinary retention is likely secondary to diabetic neuropathy in the setting of uncontrolled DM (HbA1C 8.7, 13 within past year).    Recommendations:  - Worsening kidney function is likely related to an intrinsic process. Low concern for an obstructive process given US findings and absence of hydronephrosis.   - Recommend maintaining adhikari catheter throughout admission and upon discharge   - May trial oxybutynin 5mg TID for symptoms of bladder spasms and pressure (if patient has no history of glaucoma)  - Appreciate nephrology recommendations for management of CKD  - Order urology referral on discharge for outpatient urology follow up " for trial of void and evaluation of urinary retention  - Urology will sign off, please re-engage if needed    Discussed with chief resident Dr. Hunter, to be discussed with attending Dr. Tyler Dejesus MD

## 2025-01-17 ENCOUNTER — APPOINTMENT (OUTPATIENT)
Dept: CARDIOLOGY | Facility: CLINIC | Age: 60
End: 2025-01-17
Payer: COMMERCIAL

## 2025-01-17 LAB
ALBUMIN SERPL BCP-MCNC: 3.2 G/DL (ref 3.4–5)
ANION GAP SERPL CALC-SCNC: 16 MMOL/L (ref 10–20)
ATRIAL RATE: 77 BPM
BUN SERPL-MCNC: 96 MG/DL (ref 6–23)
CALCIUM SERPL-MCNC: 8.9 MG/DL (ref 8.6–10.6)
CHLORIDE SERPL-SCNC: 101 MMOL/L (ref 98–107)
CO2 SERPL-SCNC: 27 MMOL/L (ref 21–32)
CREAT SERPL-MCNC: 4.38 MG/DL (ref 0.5–1.05)
EGFRCR SERPLBLD CKD-EPI 2021: 11 ML/MIN/1.73M*2
ERYTHROCYTE [DISTWIDTH] IN BLOOD BY AUTOMATED COUNT: 14.5 % (ref 11.5–14.5)
GLUCOSE BLD MANUAL STRIP-MCNC: 121 MG/DL (ref 74–99)
GLUCOSE BLD MANUAL STRIP-MCNC: 133 MG/DL (ref 74–99)
GLUCOSE BLD MANUAL STRIP-MCNC: 241 MG/DL (ref 74–99)
GLUCOSE SERPL-MCNC: 152 MG/DL (ref 74–99)
HCT VFR BLD AUTO: 31.7 % (ref 36–46)
HGB BLD-MCNC: 9.4 G/DL (ref 12–16)
MAGNESIUM SERPL-MCNC: 2.76 MG/DL (ref 1.6–2.4)
MCH RBC QN AUTO: 29.6 PG (ref 26–34)
MCHC RBC AUTO-ENTMCNC: 29.7 G/DL (ref 32–36)
MCV RBC AUTO: 100 FL (ref 80–100)
NRBC BLD-RTO: 0 /100 WBCS (ref 0–0)
P AXIS: 30 DEGREES
P OFFSET: 207 MS
P ONSET: 150 MS
PHOSPHATE SERPL-MCNC: 7 MG/DL (ref 2.5–4.9)
PLATELET # BLD AUTO: 374 X10*3/UL (ref 150–450)
POTASSIUM SERPL-SCNC: 4.3 MMOL/L (ref 3.5–5.3)
PR INTERVAL: 134 MS
Q ONSET: 217 MS
QRS COUNT: 13 BEATS
QRS DURATION: 94 MS
QT INTERVAL: 402 MS
QTC CALCULATION(BAZETT): 454 MS
QTC FREDERICIA: 436 MS
R AXIS: 23 DEGREES
RBC # BLD AUTO: 3.18 X10*6/UL (ref 4–5.2)
SODIUM SERPL-SCNC: 140 MMOL/L (ref 136–145)
T AXIS: 22 DEGREES
T OFFSET: 418 MS
VENTRICULAR RATE: 77 BPM
WBC # BLD AUTO: 7.2 X10*3/UL (ref 4.4–11.3)

## 2025-01-17 PROCEDURE — 2500000001 HC RX 250 WO HCPCS SELF ADMINISTERED DRUGS (ALT 637 FOR MEDICARE OP): Performed by: NURSE PRACTITIONER

## 2025-01-17 PROCEDURE — 2500000002 HC RX 250 W HCPCS SELF ADMINISTERED DRUGS (ALT 637 FOR MEDICARE OP, ALT 636 FOR OP/ED): Performed by: NURSE PRACTITIONER

## 2025-01-17 PROCEDURE — C1894 INTRO/SHEATH, NON-LASER: HCPCS | Performed by: INTERNAL MEDICINE

## 2025-01-17 PROCEDURE — 99233 SBSQ HOSP IP/OBS HIGH 50: CPT | Performed by: INTERNAL MEDICINE

## 2025-01-17 PROCEDURE — 2500000001 HC RX 250 WO HCPCS SELF ADMINISTERED DRUGS (ALT 637 FOR MEDICARE OP): Performed by: STUDENT IN AN ORGANIZED HEALTH CARE EDUCATION/TRAINING PROGRAM

## 2025-01-17 PROCEDURE — 2500000004 HC RX 250 GENERAL PHARMACY W/ HCPCS (ALT 636 FOR OP/ED): Performed by: INTERNAL MEDICINE

## 2025-01-17 PROCEDURE — 2500000001 HC RX 250 WO HCPCS SELF ADMINISTERED DRUGS (ALT 637 FOR MEDICARE OP)

## 2025-01-17 PROCEDURE — 85027 COMPLETE CBC AUTOMATED: CPT | Performed by: NURSE PRACTITIONER

## 2025-01-17 PROCEDURE — 99152 MOD SED SAME PHYS/QHP 5/>YRS: CPT | Performed by: INTERNAL MEDICINE

## 2025-01-17 PROCEDURE — 99153 MOD SED SAME PHYS/QHP EA: CPT | Performed by: INTERNAL MEDICINE

## 2025-01-17 PROCEDURE — 93451 RIGHT HEART CATH: CPT | Performed by: INTERNAL MEDICINE

## 2025-01-17 PROCEDURE — 4A023N6 MEASUREMENT OF CARDIAC SAMPLING AND PRESSURE, RIGHT HEART, PERCUTANEOUS APPROACH: ICD-10-PCS | Performed by: INTERNAL MEDICINE

## 2025-01-17 PROCEDURE — 2500000004 HC RX 250 GENERAL PHARMACY W/ HCPCS (ALT 636 FOR OP/ED): Performed by: NURSE PRACTITIONER

## 2025-01-17 PROCEDURE — 82947 ASSAY GLUCOSE BLOOD QUANT: CPT

## 2025-01-17 PROCEDURE — 83735 ASSAY OF MAGNESIUM: CPT | Performed by: NURSE PRACTITIONER

## 2025-01-17 PROCEDURE — 2500000005 HC RX 250 GENERAL PHARMACY W/O HCPCS: Performed by: INTERNAL MEDICINE

## 2025-01-17 PROCEDURE — 36415 COLL VENOUS BLD VENIPUNCTURE: CPT | Performed by: NURSE PRACTITIONER

## 2025-01-17 PROCEDURE — 2500000002 HC RX 250 W HCPCS SELF ADMINISTERED DRUGS (ALT 637 FOR MEDICARE OP, ALT 636 FOR OP/ED): Performed by: STUDENT IN AN ORGANIZED HEALTH CARE EDUCATION/TRAINING PROGRAM

## 2025-01-17 PROCEDURE — 1200000002 HC GENERAL ROOM WITH TELEMETRY DAILY

## 2025-01-17 PROCEDURE — C1769 GUIDE WIRE: HCPCS | Performed by: INTERNAL MEDICINE

## 2025-01-17 PROCEDURE — 2720000007 HC OR 272 NO HCPCS: Performed by: INTERNAL MEDICINE

## 2025-01-17 PROCEDURE — 99232 SBSQ HOSP IP/OBS MODERATE 35: CPT

## 2025-01-17 PROCEDURE — B2141ZZ FLUOROSCOPY OF RIGHT HEART USING LOW OSMOLAR CONTRAST: ICD-10-PCS | Performed by: INTERNAL MEDICINE

## 2025-01-17 PROCEDURE — C1887 CATHETER, GUIDING: HCPCS | Performed by: INTERNAL MEDICINE

## 2025-01-17 PROCEDURE — 80069 RENAL FUNCTION PANEL: CPT | Performed by: NURSE PRACTITIONER

## 2025-01-17 RX ORDER — MIDAZOLAM HYDROCHLORIDE 1 MG/ML
INJECTION, SOLUTION INTRAMUSCULAR; INTRAVENOUS AS NEEDED
Status: DISCONTINUED | OUTPATIENT
Start: 2025-01-17 | End: 2025-01-17 | Stop reason: HOSPADM

## 2025-01-17 RX ORDER — LIDOCAINE HYDROCHLORIDE 10 MG/ML
INJECTION, SOLUTION EPIDURAL; INFILTRATION; INTRACAUDAL; PERINEURAL AS NEEDED
Status: DISCONTINUED | OUTPATIENT
Start: 2025-01-17 | End: 2025-01-17 | Stop reason: HOSPADM

## 2025-01-17 RX ORDER — FENTANYL CITRATE 50 UG/ML
INJECTION, SOLUTION INTRAMUSCULAR; INTRAVENOUS AS NEEDED
Status: DISCONTINUED | OUTPATIENT
Start: 2025-01-17 | End: 2025-01-17 | Stop reason: HOSPADM

## 2025-01-17 RX ORDER — INSULIN GLARGINE 100 [IU]/ML
18 INJECTION, SOLUTION SUBCUTANEOUS EVERY 24 HOURS
Status: DISCONTINUED | OUTPATIENT
Start: 2025-01-17 | End: 2025-01-23 | Stop reason: HOSPADM

## 2025-01-17 RX ORDER — INSULIN GLARGINE 100 [IU]/ML
18 INJECTION, SOLUTION SUBCUTANEOUS EVERY 24 HOURS
Status: DISCONTINUED | OUTPATIENT
Start: 2025-01-18 | End: 2025-01-17

## 2025-01-17 RX ADMIN — OXYBUTYNIN CHLORIDE 5 MG: 5 TABLET ORAL at 21:00

## 2025-01-17 RX ADMIN — ISOSORBIDE DINITRATE 10 MG: 10 TABLET ORAL at 14:56

## 2025-01-17 RX ADMIN — ISOSORBIDE DINITRATE 10 MG: 10 TABLET ORAL at 09:08

## 2025-01-17 RX ADMIN — HEPARIN SODIUM 7500 UNITS: 5000 INJECTION INTRAVENOUS; SUBCUTANEOUS at 14:57

## 2025-01-17 RX ADMIN — OXYBUTYNIN CHLORIDE 5 MG: 5 TABLET ORAL at 09:08

## 2025-01-17 RX ADMIN — GABAPENTIN 300 MG: 300 CAPSULE ORAL at 20:59

## 2025-01-17 RX ADMIN — FOLIC ACID 1 MG: 1 TABLET ORAL at 09:08

## 2025-01-17 RX ADMIN — ATORVASTATIN CALCIUM 40 MG: 40 TABLET, FILM COATED ORAL at 21:00

## 2025-01-17 RX ADMIN — HYDRALAZINE HYDROCHLORIDE 50 MG: 50 TABLET ORAL at 09:08

## 2025-01-17 RX ADMIN — ISOSORBIDE DINITRATE 10 MG: 10 TABLET ORAL at 20:59

## 2025-01-17 RX ADMIN — HEPARIN SODIUM 7500 UNITS: 5000 INJECTION INTRAVENOUS; SUBCUTANEOUS at 21:12

## 2025-01-17 RX ADMIN — HYDRALAZINE HYDROCHLORIDE 50 MG: 50 TABLET ORAL at 14:57

## 2025-01-17 RX ADMIN — IRON SUCROSE 200 MG: 20 INJECTION, SOLUTION INTRAVENOUS at 06:02

## 2025-01-17 RX ADMIN — NEPHROCAP 1 CAPSULE: 1 CAP ORAL at 09:07

## 2025-01-17 RX ADMIN — ACETAMINOPHEN 650 MG: 325 TABLET ORAL at 20:59

## 2025-01-17 RX ADMIN — HYDRALAZINE HYDROCHLORIDE 50 MG: 50 TABLET ORAL at 21:00

## 2025-01-17 RX ADMIN — AMLODIPINE BESYLATE 10 MG: 10 TABLET ORAL at 09:08

## 2025-01-17 RX ADMIN — GABAPENTIN 300 MG: 300 CAPSULE ORAL at 09:08

## 2025-01-17 RX ADMIN — OXYBUTYNIN CHLORIDE 5 MG: 5 TABLET ORAL at 14:57

## 2025-01-17 RX ADMIN — FERROUS SULFATE TAB 325 MG (65 MG ELEMENTAL FE) 325 MG: 325 (65 FE) TAB at 09:07

## 2025-01-17 RX ADMIN — INSULIN GLARGINE 18 UNITS: 100 INJECTION, SOLUTION SUBCUTANEOUS at 23:10

## 2025-01-17 RX ADMIN — ASPIRIN 81 MG: 81 TABLET, CHEWABLE ORAL at 09:07

## 2025-01-17 ASSESSMENT — COGNITIVE AND FUNCTIONAL STATUS - GENERAL
HELP NEEDED FOR BATHING: A LITTLE
HELP NEEDED FOR BATHING: A LITTLE
PERSONAL GROOMING: A LITTLE
DRESSING REGULAR UPPER BODY CLOTHING: A LITTLE
MOBILITY SCORE: 20
MOVING TO AND FROM BED TO CHAIR: A LITTLE
DRESSING REGULAR LOWER BODY CLOTHING: A LITTLE
TOILETING: A LITTLE
WALKING IN HOSPITAL ROOM: A LITTLE
TOILETING: A LITTLE
MOVING TO AND FROM BED TO CHAIR: A LITTLE
CLIMB 3 TO 5 STEPS WITH RAILING: A LITTLE
STANDING UP FROM CHAIR USING ARMS: A LITTLE
CLIMB 3 TO 5 STEPS WITH RAILING: A LITTLE
PERSONAL GROOMING: A LITTLE
DRESSING REGULAR UPPER BODY CLOTHING: A LITTLE
STANDING UP FROM CHAIR USING ARMS: A LITTLE
WALKING IN HOSPITAL ROOM: A LITTLE
MOVING TO AND FROM BED TO CHAIR: A LITTLE
HELP NEEDED FOR BATHING: A LITTLE
DAILY ACTIVITIY SCORE: 19
DAILY ACTIVITIY SCORE: 19
DRESSING REGULAR UPPER BODY CLOTHING: A LITTLE
PERSONAL GROOMING: A LITTLE
CLIMB 3 TO 5 STEPS WITH RAILING: A LITTLE
TOILETING: A LITTLE
DAILY ACTIVITIY SCORE: 19
DRESSING REGULAR UPPER BODY CLOTHING: A LITTLE
MOBILITY SCORE: 20
DRESSING REGULAR LOWER BODY CLOTHING: A LITTLE
DAILY ACTIVITIY SCORE: 19
STANDING UP FROM CHAIR USING ARMS: A LITTLE
PERSONAL GROOMING: A LITTLE
MOVING TO AND FROM BED TO CHAIR: A LITTLE
DRESSING REGULAR LOWER BODY CLOTHING: A LITTLE
MOBILITY SCORE: 20
STANDING UP FROM CHAIR USING ARMS: A LITTLE
CLIMB 3 TO 5 STEPS WITH RAILING: A LITTLE
WALKING IN HOSPITAL ROOM: A LITTLE
TOILETING: A LITTLE
MOBILITY SCORE: 20
DRESSING REGULAR LOWER BODY CLOTHING: A LITTLE
HELP NEEDED FOR BATHING: A LITTLE
WALKING IN HOSPITAL ROOM: A LITTLE

## 2025-01-17 ASSESSMENT — PAIN - FUNCTIONAL ASSESSMENT
PAIN_FUNCTIONAL_ASSESSMENT: 0-10
PAIN_FUNCTIONAL_ASSESSMENT: 0-10

## 2025-01-17 ASSESSMENT — PAIN SCALES - GENERAL
PAINLEVEL_OUTOF10: 0 - NO PAIN
PAINLEVEL_OUTOF10: 0 - NO PAIN

## 2025-01-17 NOTE — CARE PLAN
Problem: Pain - Adult  Goal: Verbalizes/displays adequate comfort level or baseline comfort level  Outcome: Progressing     Problem: Skin  Goal: Prevent/manage excess moisture  Outcome: Progressing     Problem: Skin  Goal: Prevent/minimize sheer/friction injuries  Outcome: Progressing     Problem: Fall/Injury  Goal: Be free from injury by end of the shift  Outcome: Progressing     Problem: Diabetes  Goal: Maintain glucose levels >70mg/dl to <250mg/dl throughout shift  Outcome: Progressing     Problem: Heart Failure  Goal: Weight from fluid excess reduced over 2-3 days, then stabilize  Outcome: Progressing       The clinical goals for the shift include Pt's Cr level will decrease with am labs.

## 2025-01-17 NOTE — CARE PLAN
The patient's goals for the shift include rest    The clinical goals for the shift include R heart cath, remain HDS      Problem: Pain - Adult  Goal: Verbalizes/displays adequate comfort level or baseline comfort level  Outcome: Progressing     Problem: Safety - Adult  Goal: Free from fall injury  Outcome: Progressing     Problem: Discharge Planning  Goal: Discharge to home or other facility with appropriate resources  Outcome: Progressing     Problem: Chronic Conditions and Co-morbidities  Goal: Patient's chronic conditions and co-morbidity symptoms are monitored and maintained or improved  Outcome: Progressing     Problem: Skin  Goal: Decreased wound size/increased tissue granulation at next dressing change  Outcome: Progressing  Goal: Participates in plan/prevention/treatment measures  Outcome: Progressing  Goal: Prevent/manage excess moisture  Outcome: Progressing  Goal: Prevent/minimize sheer/friction injuries  Outcome: Progressing  Goal: Promote/optimize nutrition  Outcome: Progressing  Goal: Promote skin healing  Outcome: Progressing     Problem: Fall/Injury  Goal: Not fall by end of shift  Outcome: Progressing  Goal: Be free from injury by end of the shift  Outcome: Progressing  Goal: Verbalize understanding of personal risk factors for fall in the hospital  Outcome: Progressing  Goal: Verbalize understanding of risk factor reduction measures to prevent injury from fall in the home  Outcome: Progressing  Goal: Use assistive devices by end of the shift  Outcome: Progressing  Goal: Pace activities to prevent fatigue by end of the shift  Outcome: Progressing     Problem: Diabetes  Goal: Achieve decreasing blood glucose levels by end of shift  Outcome: Progressing  Goal: Increase stability of blood glucose readings by end of shift  Outcome: Progressing  Goal: Decrease in ketones present in urine by end of shift  Outcome: Progressing  Goal: Maintain electrolyte levels within acceptable range throughout  shift  Outcome: Progressing  Goal: Maintain glucose levels >70mg/dl to <250mg/dl throughout shift  Outcome: Progressing  Goal: No changes in neurological exam by end of shift  Outcome: Progressing  Goal: Learn about and adhere to nutrition recommendations by end of shift  Outcome: Progressing  Goal: Vital signs within normal range for age by end of shift  Outcome: Progressing  Goal: Increase self care and/or family involovement by end of shift  Outcome: Progressing  Goal: Receive DSME education by end of shift  Outcome: Progressing     Problem: Heart Failure  Goal: Improved gas exchange this shift  Outcome: Progressing  Goal: Improved urinary output this shift  Outcome: Progressing  Goal: Reduction in peripheral edema within 24 hours  Outcome: Progressing  Goal: Report improvement of dyspnea/breathlessness this shift  Outcome: Progressing  Goal: Weight from fluid excess reduced over 2-3 days, then stabilize  Outcome: Progressing  Goal: Increase self care and/or family involvement in 24 hours  Outcome: Progressing     Problem: Pain  Goal: Takes deep breaths with improved pain control throughout the shift  Outcome: Progressing  Goal: Turns in bed with improved pain control throughout the shift  Outcome: Progressing  Goal: Walks with improved pain control throughout the shift  Outcome: Progressing  Goal: Performs ADL's with improved pain control throughout shift  Outcome: Progressing  Goal: Participates in PT with improved pain control throughout the shift  Outcome: Progressing  Goal: Free from opioid side effects throughout the shift  Outcome: Progressing  Goal: Free from acute confusion related to pain meds throughout the shift  Outcome: Progressing

## 2025-01-17 NOTE — POST-PROCEDURE NOTE
Physician Transition of Care Summary  Invasive Cardiovascular Lab    Procedure Date: 1/17/2025  Attending:    * Ana Lea - Primary  Resident/Fellow/Other Assistant: Surgeons and Role:  * No surgeons found with a matching role *    Indications:   Pre-op Diagnosis      * VENKAT (acute kidney injury) (CMS-HCC) [N17.9]     * Acute on chronic diastolic (congestive) heart failure [I50.33]     * Chronic kidney disease, unspecified CKD stage [N18.9]    Post-procedure diagnosis:   Post-op Diagnosis     * VENKAT (acute kidney injury) (CMS-[facility]) [N17.9]     * Acute on chronic diastolic (congestive) heart failure [I50.33]     * Chronic kidney disease, unspecified CKD stage [N18.9]    Procedure(s):   Right Heart Cath  55512 - NC RIGHT HEART CATH O2 SATURATION & CARDIAC OUTPUT        Procedure Findings:   RA: 8  PA: 51/15  PCWP: 14  CO/CI: 18.2/9   PA sat 81 %   IVC sat 86%   Right subclavian sat 79  AO 98%       Description of the Procedure:   R brachial vein 5 Fr--> Manual pressure    Complications:   None    Stents/Implants:   Implants       No implant documentation for this case.            Anticoagulation/Antiplatelet Plan:   ASA    Estimated Blood Loss:   3 mL    Anesthesia: Moderate Sedation Anesthesia Staff: No anesthesia staff entered.    Any Specimen(s) Removed:   No specimens collected during this procedure.    Disposition:   Home       Electronically signed by: Sada Vazquez MD, 1/17/2025 5:14 PM

## 2025-01-17 NOTE — PROGRESS NOTES
.bslHVI Attending Shared Visit Note    This is a shared visit. Please see Advanced Practice Provider's encounter note for additional details.        Overnight events/Subjective: Creatinine still rising    Exam:   Physical exam: Well appearing, no distress. Normal rate, regular rhythm, non labored breathing, clear to auscultation, abdomen non distended, no LE edema, no focal neuro deficits.     A/P:   59 F with IDDM with complications, HLD, PAD, CKD, HTN, multple prior foot infections who presented with dyspnea.  Was treated for HFpEF exacerbation in the setting of hypertension.    #VENKAT: creatinine continues to rise. Possible contribution from relative hypotension? Overdiuresis (? High wedge pressure recently). Trial 500 ml fluid. Will consider RHC  #HFpEF: Continue holding torsemide, SGLT2i.   #BP continue amlodipine, isordil and hydralazine. Goal BP around 120-140.   #Anemia: consider outpt colonoscopy    Dispo: Follow up with Dr. Sparks will need to be rescheduled.     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)    Chronic kidney disease      Objective   Admit Date: 1/2/2025  Hospital Length of Stay: 14   Home: [facility] [id number]    Vitals:      1/16/2025     8:38 PM 1/16/2025     4:15 PM 1/16/2025    11:42 AM 1/16/2025     8:22 AM 1/16/2025     6:07 AM 1/15/2025    11:00 PM 1/15/2025     8:42 PM   Vitals   Systolic 104 123 111 147 121 110 127   Diastolic 66 67 66 82 71 67 66   BP Location  Left arm Left arm Left arm Left arm Left arm Left arm Left arm   Heart Rate 73    71 74 85   Temp 37 °C (98.6 °F) 36.3 °C (97.3 °F) 36.4 °C (97.5 °F) 36 °C (96.8 °F) 36.2 °C (97.2 °F) 36.3 °C (97.3 °F) 36.5 °C (97.7 °F)   Resp 16    16 14 21   Weight (lb)     225.75     BMI     41.29 kg/m2     BSA (m2)     2.11 m2       Wt Readings from Last 5 Encounters:   01/16/25 102 kg (225 lb 12 oz)   11/19/24 99.8 kg (220 lb)   11/15/24 99.8 kg (220 lb 0.3 oz)   09/05/24 99.8 kg (220 lb)   09/04/24 102 kg (224 lb)       Intake/Output Summary (Last 24 hours) at 1/16/2025 2120  Last data filed at 1/16/2025 1700  Gross per 24 hour   Intake 240 ml   Output 950 ml   Net -710 ml         MEDICATIONS  Infusions:     Scheduled:  amLODIPine, 10 mg, Daily  aspirin, 81 mg, Daily  atorvastatin, 40 mg, Nightly  capsaicin, , TID  [START ON 1/17/2025] ferrous sulfate (325 mg ferrous sulfate), 65 mg of iron, Daily with breakfast  folic acid, 1 mg, Daily  gabapentin, 300 mg, BID  heparin (porcine), 7,500 Units, q8h NEHEMIAS  hydrALAZINE, 50 mg, TID  insulin glargine, 18 Units, Daily  insulin lispro, 0-10 Units, TID AC  iron sucrose, 200 mg, Daily  isosorbide dinitrate, 10 mg, TID  oxybutynin, 5 mg, TID  polyethylene glycol, 17 g, Daily  sennosides-docusate sodium, 2 tablet, BID  [Held by provider] torsemide, 60 mg, BID  vitamin B complex-vitamin C-folic acid, 1 capsule, Daily      PRN:  acetaminophen, 650 mg, q6h PRN  albuterol, 2.5 mg, q6h PRN  bisacodyl, 10 mg, Daily PRN  dextrose, 12.5 g, q15 min PRN  dextrose, 25 g, q15 min PRN  glucagon, 1 mg, q15 min PRN  glucagon, 1 mg, q15 min PRN  oxygen, , Continuous PRN - O2/gases  oxygen, , Continuous PRN - O2/gases      Prior to Admission Meds:  Medications Prior to Admission   Medication Sig Dispense Refill Last Dose/Taking    acetaminophen (Tylenol) 325 mg tablet Take 2 tablets (650 mg) by mouth every 4 hours if needed for mild pain (1 - 3).   Past Month    albuterol 90 mcg/actuation inhaler Inhale 2  puffs every 6 hours if needed for wheezing. 6.7 g 0 Past Week    b complex vitamins capsule Take 1 capsule by mouth once daily.   1/1/2025    cloNIDine (Catapres) 0.1 mg tablet Take 1 tablet (0.1 mg) by mouth 2 times a day. 180 tablet 0 1/1/2025    folic acid (Folvite) 1 mg tablet Take 1 tablet (1 mg) by mouth once daily.   1/1/2025    gabapentin (Neurontin) 300 mg capsule Take 1 capsule (300 mg) by mouth 2 times a day. 180 capsule 0 1/1/2025    insulin glargine (Basaglar KwikPen U-100 Insulin) 100 unit/mL (3 mL) pen Inject 32 Units under the skin once daily in the morning. Take as directed per insulin instructions. 15 mL 1 12/31/2024    insulin lispro (Admelog SoloStar U-100 Insulin) 100 unit/mL injection Inject per sliding scale instructions under the skin 3 times a day with meals. (Max daily dose 70 units) 15 mL 1 12/31/2024    tirzepatide (Mounjaro) 2.5 mg/0.5 mL pen injector Inject 2.5 mg under the skin every 7 days. 2 mL 0 12/30/2024    [DISCONTINUED] amLODIPine (Norvasc) 10 mg tablet Take 1 tablet (10 mg) by mouth once daily. 90 tablet 0 1/1/2025    [DISCONTINUED] aspirin 81 mg chewable tablet Chew 1 tablet (81 mg) once daily. 90 tablet 0 1/1/2025 Noon    [DISCONTINUED] atorvastatin (Lipitor) 40 mg tablet Take 1 tablet (40 mg) by mouth once daily. 90 tablet 0 1/1/2025    blood-glucose sensor (FreeStyle [place] 3 Sensor) device Use to monitor blood glucose. Change sensor every 14 days. 2 each 11     FreeStyle Remington 3 [place] misc Use as instructed to monitor blood glucose. 1 each 0     furosemide (Lasix) 20 mg tablet Take 1 tablet (20 mg) by mouth once daily.       meclizine (Antivert) 25 mg tablet,chewable Chew 1 tablet (25 mg) 3 times a day as needed (vertigo). (Patient not taking: Reported on 1/2/2025) 90 tablet 1 Not Taking    OneTouch Delica Plus Lancet 30 gauge misc USE TO TEST BLOOD SUGAR AS DIRECTED 200 each 0     OneTouch Ultra Test strip Use 1 strip BID to check glucose 200 strip 1     OneTouch Ultra2  "Meter misc use 1 to 2 times daily 1 each 0     pen needle, diabetic 32 gauge x 5/32\" needle Use four times a day with insulin use 360 each 1        DATA:  CMP:  Recent Labs     01/16/25  0953 01/15/25  1919 01/14/25  1847 01/13/25  1949 01/12/25  2100 01/11/25  1924 01/10/25  2027 01/09/25  1913 01/08/25  1936    138 140 140 137 138 138 138 140   K 4.6 4.5 4.4 4.3 5.0 4.6 4.7 4.8 4.2    99 98 97* 96* 99 101 102 104   CO2 26 27 29 31 25 27 25 24 26   ANIONGAP 17 17 17 16 21* 17 17 17 14   BUN 95* 91* 90* 89* 88* 73* 69* 62* 60*   CREATININE 4.40* 4.27* 4.28* 4.06* 4.06* 3.89* 3.85* 3.61* 3.73*   EGFR 11* 11* 11* 12* 12* 13* 13* 14* 13*   MG  --  2.64* 2.64* 2.71* 2.60* 2.47* 2.36 2.40 2.32     Recent Labs     01/16/25  0953 01/15/25  1919 01/14/25  1847 01/13/25  1949 01/12/25  2100 01/11/25  1924 01/10/25  2027 01/09/25  1913 01/02/25  1835 01/02/25  1253 09/06/24  0120 04/17/24  0901 04/15/24  2128 02/07/24  0422 02/06/24  0735 02/04/24  0555 02/03/24  0122 01/26/24  1343 01/26/24  0358 01/25/24  1457 01/24/24  2032 01/23/24  1230 01/12/24  0603 01/11/24  0747 01/09/24  1210 08/13/23  1712 07/04/19  0707 04/02/19  1106 02/17/19  1207   ALBUMIN 3.1* 3.3* 3.5 3.6 3.4 3.5 3.3* 3.7   < > 3.6 3.3*   < > 3.5   < > 2.9*   < > 3.4   < > 2.7*   < > 3.4 4.1   < > 2.5*   < > 3.6   < > 3.9 3.8  3.8   ALT  --   --   --   --   --   --   --   --   --  12 10  --  23  --   --   --  22  --  19  --  25 16  --  6   < > 11   < > 12 10   AST  --   --   --   --   --   --   --   --   --  14 11  --  22  --   --   --  21  --  15  --  28 17  --  8   < > 11   < > 13 12   BILITOT  --   --   --   --   --   --   --   --   --  0.4 0.4  --  0.4  --   --   --  0.4  --  0.2  --  0.4 0.3  --  <0.2   < > 0.4   < > 0.5 0.4   LIPASE  --   --   --   --   --   --   --   --   --   --   --   --   --   --  5*  --   --   --   --   --   --   --   --   --   --  <3*  --  7* 3*    < > = values in this interval not displayed.     CBC:  Recent Labs     " 01/16/25  1906 01/15/25  1919 01/14/25  1847 01/13/25  1949 01/11/25  1924 01/10/25  2027 01/09/25  1913 01/08/25  1936   WBC 7.5 5.9 6.4 5.8 6.5 6.6 5.5 6.7   HGB 9.2* 9.4* 9.7* 10.4* 9.9* 8.9* 9.6* 9.4*   HCT 29.7* 32.2* 30.3* 34.0* 30.0* 29.1* 30.7* 30.5*    371 415 410 383 330 324 326   MCV 97 106* 96 97 90 98 95 96     COAG:   Recent Labs     04/15/24  2128 01/29/24  1217 01/24/24  2032 10/21/19  1709 04/02/19  1106 02/17/19  1207   INR 0.9 1.1 1.2* 1.0 1.0 1.1     ABO:   Recent Labs     04/17/24  0901   ABO A     HEME/ENDO:   Recent Labs     01/11/25  1924 01/02/25  1835 11/19/24  1406 07/19/24  0852 04/16/24  0637 02/03/24  0122 01/26/24  0358 01/24/24  2032 01/18/24  0525 01/15/24  0606 09/14/23  1047 06/14/23  1016 05/08/23  1320 01/27/23  0840 01/26/23  1613   FERRITIN 62  --   --   --   --   --   --   --  276* 487*  --   --   --   --   --    IRONSAT 33  --   --   --   --   --   --   --  34 32  --  25  --   --   --    TSH  --   --   --   --   --  0.46 1.37  --   --   --   --  0.83  --   --  0.27*   FREET4  --   --   --   --   --   --   --   --   --   --   --   --   --  1.16 1.14   HGBA1C  --  8.7* 9* 11.2* 11.9*  --   --    < >  --   --    < >  --    < >  --   --     < > = values in this interval not displayed.     CARDIAC:   Recent Labs     01/04/25  2119 01/02/25  1253 02/03/24  0122 01/24/24  2032 01/25/23  1658 01/25/23  1616   TROPHS  --  21 12 25 7 8   * 376* 185* 311* 24  --      Recent Labs     05/08/23  0935 04/12/21  0906 10/20/20  1236   CHOL 174 161 181   LDLF 98 101* 115*   HDL 38.5* 38.9* 41.5   TRIG 189* 104 121     TOX:  Recent Labs     02/03/24  0800   AMPHETAMINE Presumptive Negative   BENZO Presumptive Negative   CANNABINOID Presumptive Negative   COCAI Presumptive Negative   FENTANYL Presumptive Negative   OPIATE Presumptive Negative   OXYCODONE Presumptive Negative   PCP Presumptive Negative     MICRO:   Recent Labs     02/05/24  0446 02/03/24  0953 02/03/24  0122  01/29/24  1217 01/25/24  0819 01/23/24  1230   CRP 1.29*  --  1.86*  --   --  0.21   PROCAL  --  0.05  --  0.05 0.14*  --      No results found for the last 90 days.      FOLLOWUP:   Future Appointments   Date Time Provider Department Center   1/17/2025 10:00 AM Marylin Sparks MD [facility] [id number] [facility]   1/20/2025 11:30 AM Marcin Motta MD [id number] [facility]   1/22/2025  2:30 PM Austin Early DO [id number] [facility]   2/5/2025  9:45 AM Barrera Rodriguez MD [id number] [facility]   2/20/2025 10:30 AM Mone Aquino MD [id number] [facility]

## 2025-01-17 NOTE — INTERVAL H&P NOTE
H&P reviewed. The patient was examined and there are no changes to the H&P. Patient seen and assessed pre-procedure. Allergies and current medications reviewed. NPO since midnight, all questions answered. Plan for RHC. Does report mild CP, denies any SOB, comfortable on RA, able to lay flat. Labs reviewed, Hgb/Plt 9.2/397, GFR/Cr 12/4.03. Patient currently on SAPT with ASA 81.

## 2025-01-17 NOTE — PROGRESS NOTES
NEPHROLOGY FOLLOW UP NOTE    Nuris Squires   59 y.o.      MRN/Room: [id number]*    Subjective: Seen at the bedside in no distress. Has been NPO since midnight in preporation for RHC. Note sfeeling less dry but is thirsty 2/2 being NPO for procedure. Creatine improved (4.03 from 4.04)  on the evening of January 16th after improved P.O. fluid intake and 500 ml fluid bolus.       Objective:     Reported Intake and output numbers for January 16th are incorrect. 480 ml of P.O. intake is charted along with 900 ml of urine output. 500 ml fluid bolus is documented under medications but is not included in intake measurements.      Significant post adhikari urine output of 2100 ml of fluid.    3 Day Weight Change: 0.7 kg (1 lb 8.7 oz) per day       Physical Exam  HENT:      Head: Normocephalic and atraumatic.      Mouth/Throat:      Comments: Mild dryness of mucous membranes found on exam.   Eyes:      Extraocular Movements: Extraocular movements intact.      Conjunctiva/sclera: Conjunctivae normal.   Cardiovascular:      Rate and Rhythm: Normal rate and regular rhythm.      Heart sounds: Normal heart sounds.   Pulmonary:      Effort: Pulmonary effort is normal.      Breath sounds: Normal breath sounds.   Abdominal:      General: There is no abdominal bruit.      Palpations: There is no fluid wave.      Tenderness: There is no abdominal tenderness. There is no right CVA tenderness, left CVA tenderness, guarding or rebound.      Comments: Mild selene-umbilical tenderness noted   Musculoskeletal:      Right lower leg: No edema.      Left lower leg: No edema.      Comments: No lower extremity edema noted. Previously had 1+ edema of RLE.    Skin:     General: Skin is warm and dry.   Neurological:      Mental Status: She is alert.      Comments: No asterixis noted            Meds:   amLODIPine, 10 mg, Daily  aspirin, 81 mg, Daily  atorvastatin, 40 mg, Nightly  capsaicin, , TID  ferrous sulfate (325 mg ferrous  sulfate), 65 mg of iron, Daily with breakfast  folic acid, 1 mg, Daily  gabapentin, 300 mg, BID  heparin (porcine), 7,500 Units, q8h NEHEMIAS  hydrALAZINE, 50 mg, TID  [Held by provider] insulin glargine, 18 Units, Daily  insulin lispro, 0-10 Units, TID AC  isosorbide dinitrate, 10 mg, TID  oxybutynin, 5 mg, TID  polyethylene glycol, 17 g, Daily  sennosides-docusate sodium, 2 tablet, BID  [Held by provider] torsemide, 60 mg, BID  vitamin B complex-vitamin C-folic acid, 1 capsule, Daily      oxygen, Last Rate: 4 L/min (01/17/25 1108)      acetaminophen, 650 mg, q6h PRN  albuterol, 2.5 mg, q6h PRN  bisacodyl, 10 mg, Daily PRN  dextrose, 12.5 g, q15 min PRN  dextrose, 25 g, q15 min PRN  fentaNYL PF, , PRN  glucagon, 1 mg, q15 min PRN  glucagon, 1 mg, q15 min PRN  lidocaine PF, , PRN  midazolam, , PRN  oxygen, , Continuous PRN - O2/gases  oxygen, , Continuous PRN - O2/gases  oxygen, , Continuous PRN        Vitals:    01/17/25 0812   BP: 154/83   Pulse: 66   Resp: 10   Temp: 36 °C (96.8 °F)   SpO2: 93%      Vitals:    01/14/25 0630   Weight: 100 kg (221 lb 1.9 oz)         Intake/Output Summary (Last 24 hours) at 1/17/2025 1123  Last data filed at 1/17/2025 1000  Gross per 24 hour   Intake 240 ml   Output 1250 ml   Net -1010 ml           Blood Labs:  Results for orders placed or performed during the hospital encounter of 01/02/25 (from the past 24 hours)   POCT GLUCOSE   Result Value Ref Range    POCT Glucose 192 (H) 74 - 99 mg/dL   POCT GLUCOSE   Result Value Ref Range    POCT Glucose 135 (H) 74 - 99 mg/dL   CBC   Result Value Ref Range    WBC 7.5 4.4 - 11.3 x10*3/uL    nRBC 0.0 0.0 - 0.0 /100 WBCs    RBC 3.06 (L) 4.00 - 5.20 x10*6/uL    Hemoglobin 9.2 (L) 12.0 - 16.0 g/dL    Hematocrit 29.7 (L) 36.0 - 46.0 %    MCV 97 80 - 100 fL    MCH 30.1 26.0 - 34.0 pg    MCHC 31.0 (L) 32.0 - 36.0 g/dL    RDW 14.5 11.5 - 14.5 %    Platelets 397 150 - 450 x10*3/uL   Magnesium   Result Value Ref Range    Magnesium 2.60 (H) 1.60 - 2.40  mg/dL   Renal function panel   Result Value Ref Range    Glucose 174 (H) 74 - 99 mg/dL    Sodium 136 136 - 145 mmol/L    Potassium 4.2 3.5 - 5.3 mmol/L    Chloride 98 98 - 107 mmol/L    Bicarbonate 27 21 - 32 mmol/L    Anion Gap 15 10 - 20 mmol/L    Urea Nitrogen 97 (HH) 6 - 23 mg/dL    Creatinine 4.03 (H) 0.50 - 1.05 mg/dL    eGFR 12 (L) >60 mL/min/1.73m*2    Calcium 8.8 8.6 - 10.6 mg/dL    Phosphorus 5.7 (H) 2.5 - 4.9 mg/dL    Albumin 3.1 (L) 3.4 - 5.0 g/dL   POCT GLUCOSE   Result Value Ref Range    POCT Glucose 210 (H) 74 - 99 mg/dL   POCT GLUCOSE   Result Value Ref Range    POCT Glucose 121 (H) 74 - 99 mg/dL     *Note: Due to a large number of results and/or encounters for the requested time period, some results have not been displayed. A complete set of results can be found in Results Review.          ASSESSMENT:  Nuris Squires is a  59 y.o.  Year old , with PMHx of  poorly controlled insulin dependent type 2 diabetes c/b neuropathy, hyperlipidemia, PAD, CKD stage IV, hypertension, multiple foot infections s/p partial R great toe amputation, fall w/L femur fracture s/p ORIF (4/2024) that presents to the emergency department for SOB. Admitted under HHVI Service for further management of acute ADHF and HTN Urgency.     Creatine Down trended with increased fluid intake and IV fluids on the 16th. Lends credence to the previous evaluation of hypovolemia. [facility] today for further evaluation of fluid status Would recommend continued PO fluid intake and keeping BP over 120.       #Normotensive ischemic injury with CRS   #VENKAT on Proteinuric CKD IV, nonoliguric, Stage II Li[place] 2/2 Diabetic Kidney disease   #Acute on Chronic Urinary retention   #C/f neurogenic bladder 2/2 DMII   - Baseline creatinine: 1.68   - Creatine down to 4.03 on 1/16 following fluid administration   - Etiology: Likely Multifactorial (including urinary retention and Normotensive ischemic injury)   - FeUrea: 32.5 % suggestive of pre-renal disease  -  Clinical volume status: hypervolemic  - Electrolytes (Na, K, Ca, ) are stable. Magnesium and Phos remain elevated  - Acid base status: Last Ph 7.32 by VBG on January 2nd   - No intake information noted for January 15th 2100 ml of output through adhikari   - bicarb has been up trending was 22 on admission and 29 on 1/14     Recommendations:  - Maintain Adhikari Catheter  - Agree with holding amlodipine   - Encourage salt and fluid intake 2/2 her hypovolemia after RHC   - Agree with Kidney US and Bladder US   - Appreciate Urology input   - Indication for dialysis:  No   - Avoid nephrotoxins, contrast if possible  - c/w Holding Torsemide  - Continue to maintain SBP over 120, and avoid large fluctuations in BP. SBP's in the 140's are ok for now   - strict Is/Os to evaluate patient's fluid status   - Renal dosing for medications for latest eGFR, follow medication trough as appropriate  - Avoid hypotension/rapid fluctuations in BPs    Michael Farmer MD  Nephrology Resident  24 hour Renal Pager - [phone]    Discussed with attending nephrologist

## 2025-01-17 NOTE — PROGRESS NOTES
Subjective Data:   - Pt states she is feeling fine. She denies SOB/CP. Urinating well with adhikari catheter in place.     Overnight Events:  - No acute events reported overnight.     Today in Brief:  - Repeat RHC to reassess hemodynamics/fluid status.  - Creatinine improved today following fluid bolus yesterday with continued cessation of diuresis.    - Holding AM lantus dose.    Objective Data:  Last Recorded Vitals:  Vitals:    01/16/25 2352 01/17/25 0445 01/17/25 0500 01/17/25 0812   BP: 111/68 101/65  154/83   BP Location: Left arm Left arm  Left arm   Patient Position: Lying Lying  Lying   Pulse: 70 66  66   Resp: 16 18  10   Temp: 37.1 °C (98.8 °F) 36.6 °C (97.9 °F)  36 °C (96.8 °F)   TempSrc: Temporal Temporal  Temporal   SpO2: 93% 94%  93%   Weight:   102 kg (225 lb 12 oz)    Height:         Last Labs:  CBC - 1/16/2025:  7:06 PM  7.5 9.2 397    29.7      CMP - 1/16/2025:  7:06 PM  8.8 6.9 14 --- 0.4   5.7 3.1 12 114      PTT - 4/15/2024:  9:28 PM  0.9   10.3 31     TROPHS   Date/Time Value Ref Range Status   01/02/2025 12:53 PM 21 0 - 34 ng/L Final   02/03/2024 01:22 AM 12 0 - 34 ng/L Final   01/24/2024 08:32 PM 25 0 - 34 ng/L Final   01/25/2023 04:58 PM 7 0 - 34 ng/L Final     Comment:     .  Less than 99th percentile of normal range cutoff-  Female and children under 18 years old <35 ng/L; Male <54 ng/L: Negative  Repeat testing should be performed if clinically indicated.   .  Female and children under 18 years old  ng/L; Male  ng/L:  Consistent with possible cardiac damage and possible increased clinical   risk. Serial measurements may help to assess extent of myocardial damage.   .  >120 ng/L: Consistent with cardiac damage, increased clinical risk and  myocardial infarction. Serial measurements may help assess extent of   myocardial damage.   .   NOTE: Children less than 1 year old may have higher baseline troponin   levels and results should be interpreted in conjunction with the overall    clinical context.  .  NOTE: Troponin I testing is performed using a different   testing methodology at [facility] than at other   system \[facility] [place] [facility]\"". Direct result comparisons should only   be made within the same method.     01/25/2023 04:16 PM 8 0 - 34 ng/L Final     Comment:     .  Less than 99th percentile of normal range cutoff-  Female and children under 18 years old <35 ng/L; Male <54 ng/L: Negative  Repeat testing should be performed if clinically indicated.   .  Female and children under 18 years old  ng/L; Male  ng/L:  Consistent with possible cardiac damage and possible increased clinical   risk. Serial measurements may help to assess extent of myocardial damage.   .  >120 ng/L: Consistent with cardiac damage, increased clinical risk and  myocardial infarction. Serial measurements may help assess extent of   myocardial damage.   .   NOTE: Children less than 1 year old may have higher baseline troponin   levels and results should be interpreted in conjunction with the overall   clinical context.  .  NOTE: Troponin I testing is performed using a different   testing methodology at [facility] than at other   [facility]. Direct result comparisons should only   be made within the same method.       BNP   Date/Time Value Ref Range Status   01/04/2025 09:19  0 - 99 pg/mL Final   01/02/2025 12:53  0 - 99 pg/mL Final     HGBA1C   Date/Time Value Ref Range Status   01/02/2025 06:35 PM 8.7 See comment % Final   11/19/2024 02:06 PM 9 4.2 - 6.5 % Final   07/19/2024 08:52 AM 11.2 4.2 - 6.5 % Final     VLDL   Date/Time Value Ref Range Status   05/08/2023 09:35 AM 38 0 - 40 mg/dL Final   04/12/2021 09:06 AM 21 0 - 40 mg/dL Final   10/20/2020 12:36 PM 24 0 - 40 mg/dL Final      Last I/O:  I/O last 3 completed shifts:  In: 480 (4.7 mL/kg) [P.O.:480]  Out: 1650 (16.1 mL/kg) [Urine:1650 (0.4 mL/kg/hr)]  Weight: 102.4 kg     Past Cardiology Tests (Last 3  Years):    Echo:  Transthoracic Echo (TTE) Complete 01/25/2024     PHYSICIAN INTERPRETATION:  Left Ventricle: The left ventricular systolic function is hyperdynamic, with an estimated ejection fraction of 70-75%. There are no regional wall motion abnormalities. The left ventricular cavity size is normal. The left ventricular septal wall thickness is mildly increased. There is mild concentric left ventricular hypertrophy. Spectral Doppler shows a normal pattern of left ventricular diastolic filling.  Left Atrium: The left atrium is mildly dilated.  Right Ventricle: The right ventricle was not well visualized. There is normal right ventricular global systolic function.  Right Atrium: The right atrium is normal in size.  Aortic Valve: The aortic valve is trileaflet. There is no evidence of aortic valve regurgitation. The peak instantaneous gradient of the aortic valve is 10.0 mmHg. The mean gradient of the aortic valve is 5.0 mmHg.  Mitral Valve: The mitral valve is normal in structure. There is trace to mild mitral valve regurgitation.  Tricuspid Valve: The tricuspid valve is structurally normal. There is trace to mild tricuspid regurgitation. The Doppler estimated RVSP is within normal limits at 32.4 mmHg.  Pulmonic Valve: The pulmonic valve is structurally normal. There is physiologic pulmonic valve regurgitation.  Pericardium: There is a trivial pericardial effusion.  Pleural: There is left pleural effusion.  Aorta: The aortic root is normal.    CONCLUSIONS:  1. Left ventricular systolic function is hyperdynamic with a 70-75% estimated ejection fraction.  2. RVSP within normal limits.    ** Final **    Transthoracic Echo (TTE) Complete 01/3/2025  CONCLUSIONS:   1. Left ventricular ejection fraction is normal, calculated by Argueta's biplane at 69%.   2. Spectral Doppler shows a Grade II (pseudonormal pattern) of left ventricular diastolic filling with an elevated left atrial pressure.   3. There is normal right  ventricular global systolic function.   4. The left atrium is moderately dilated.   5. Moderate mitral valve regurgitation.   6. Mechanism of MR Is possibly restricted motion of the posterior leaflet.   7. Moderate tricuspid regurgitation visualized.   8. Moderately elevated right ventricular systolic pressure.   9. Small pericardial effusion.  10. Compared with study dated 1/25/2024, the degree of MR appears moderate today rather than mild on prior study. TR is also worsened.    Inpatient Medications:  Scheduled medications   Medication Dose Route Frequency    amLODIPine  10 mg oral Daily    aspirin  81 mg oral Daily    atorvastatin  40 mg oral Nightly    capsaicin   Topical TID    ferrous sulfate (325 mg ferrous sulfate)  65 mg of iron oral Daily with breakfast    folic acid  1 mg oral Daily    gabapentin  300 mg oral BID    heparin (porcine)  7,500 Units subcutaneous q8h NEHEMIAS    hydrALAZINE  50 mg oral TID    insulin glargine  18 Units subcutaneous Daily    insulin lispro  0-10 Units subcutaneous TID AC    isosorbide dinitrate  10 mg oral TID    oxybutynin  5 mg oral TID    polyethylene glycol  17 g oral Daily    sennosides-docusate sodium  2 tablet oral BID    [Held by provider] torsemide  60 mg oral BID    vitamin B complex-vitamin C-folic acid  1 capsule oral Daily     PRN medications   Medication    acetaminophen    albuterol    bisacodyl    dextrose    dextrose    glucagon    glucagon    oxygen    oxygen     Continuous Medications   Medication Dose Last Rate     Physical Exam:  General: NAD, lying in bed  Skin: warm and dry throughout  Head/ neck: Atraumatic  Cardiac: RRR, S1S2  Pulm: CTA, equal air entry.   GI: soft, nontender, non-distended  Extremities: No LE edema appreciated.   Neuro: no focal neuro deficits  Psych: appropriate mood and behavior    Assessment/Plan   Nuris Squires is a 59 y.o. female presenting with PMH of poorly controlled insulin dependent type 2 diabetes c/b neuropathy,  hyperlipidemia, PAD, CKD stage IV, hypertension, multiple foot infections s/p partial R great toe amputation, fall w/L femur fracture s/p ORIF (4/2024) that presents to the emergency department today for shortness of breath x1 day. Admitted under HHVI Service for further management of acute ADHF and HTN Urgency.     Acute diastolic heart failure  Chest tightness, resolved  - likely 2/2 uncontrolled HTN, c/f high-output HF   - TTE (1/2024): EF 70-75%  - TTE 1/3/2025: EF 69%, grade II diastolic filling, moderate MR, moderate TR, small pericardial effusion  - neg stress 10/2019  - CXR: pulm vascular congestion BL, small bibasilar pleural effusions  - Last BNP (1/5) 161 (376 on admit iso obesity)  - HS trop 21, no ischemic changes on ECG, no c/f ACS  - dry weight ~100kg  - admit weight: 107kg  - daily weight: 102, (102, 102, 100, 102,104, 108, 108, 107, 106 kg)  - RHC (1/8): reviewed tracings with Dr. Mckee, numbers are questionable but RVEDP 20, PA 75/25, unclear wedge  - of note, pharmacy verified patient on PO lasix 20mg daily at home (?for unclear reasons, presumably prescribed by PCP for leg swelling)-> per patient, was just started on it at home  - s/p successful diuresis w/IV lasix gtt at 15mg/hr and PRN metolazone  - 1/14 held Torsemide per nephrology. Will continue to hold today until better able to assess fluid status with RHC and in the setting of elevated BUN/creatinine.   - Holding empa iso GFR <20  - avoid ACEI/ARB/ARNI, MRA for now d/t elevated Cr  - cont. Hydral 50mg TID, isordil 10mg TID  - HF navigator following, establishing care here  - Daily standing weights, strict I&O's, cardiac diet    CKD IV, worsening  Urinary retention  - Cr last year 1.7-5.3, worsening since 2022  - admit Cr 2.34  - daily Cr 4.03 (4.40, 4.28, 4.06x2, 3.89, 3.85, 3.61, 3.73)  - urine lytes, osmolality reviewed, FENa 5.6% suggesting intrinsic cause  - Nephrology consultation: Hold torsemide, recommend urology consult,  agree with kidney and bladder US, maintain SBP >120. Elevation due to aggressive diuresis, aggressive BP control, and rising bicarb/contraction alkalosis indicates her being dry.   - 1/15 adhikari placed for acute urinary retention  - 1/15 renal US: Borderline increased renal cortical echogenicity bilaterally which may reflect medical renal disease, without evidence of obstructive, bladder decompressed by adhikari, no acute process   1/15 UA no infection, + protein, +glucose  uropathy.  - 1/16 urology consult: Suspected renal dysfunction secondary to intrinsic process. Low concern for postobstructive process. Will need outpatient urology referral.   - 1/16 s/p 500cc NS bolus with improvement in today's creatinine.   -  RHC today due to rising Cr and reassess volume status.     Anemia of unclear origin  Paraesthesias, acute  - c/o paraesthesias around mouth & fingertips, new since admit  - unremarkable colonoscopy in 2019, rec'd to repeat in 7yrs for screening  - Baseline Hgb ~9-11  - Hgb today: 9.4 (9.9, 8.9, 9.6, 11.1 on admit)  - irons studies: sat 33%, folate/ferritin/vit. B12 WNL---> completed 1/16 IV iron 200mg x5 days, now on oral Fe.   - no overt signs of bleeding  - capsaicin cream for hands  - cont. OP follow-up w/PCP (scheduled 2/20)    Acute hypoxic respiratory failure iso of ADHF exacerbation, improving  Pulmonary HTN  - p.ox initially at 61% but improved rapidly with placement of nasal cannula  - She was requiring 6 L of oxygen, BiPAP briefly due to the concern for SCAPE, received SL nitro and she was able to wean off of BiPAP  -  weaned from 2L O2 to RA, however, desats at night, c/f RENZO (see below)  - VQ scan (1/9; for pulm HTN given high PA on RHC): low probability of PE  - Flu A/B/RSV/COVID negative  - pulmonary toileting w/incentive spirometry   - Saturating well on RA today with no noted increased WOB.     HTN urgency, resolved  - BP on on admit: 220/98  - SBP improved to 143 after resumption of home  meds and s/p IVP Hydral 5mg x1  - last 24 hour SBP range: 101-147  - BPs labile, clonidine weaned off on 1/5, intermittent headaches w/isordil (tolerating 10mg TID)  - 1/14 Resume Amlodipine  - 1/16 nephrology requesting permissive hypertension to help with perfusion  - No changes to antihypertensive regimen today. Reassess blood pressures daily.     Obesity  Insulin dependent Type 2 diabetes, Poorly controlled  Neuropathy  - Follows with clinical pharmacy closely/PCP  - Hgb A1c 8.7% (improved from previous 11.9%)  - BMI: 41.94  - home regimen: Mounjaro 2.5mg weekly, basaglar 32 units every morning, admelog SS with meals  - c/w lantus 18 units every morning (decreased from 32U iso AM BG of 62) and SSI #2 while inpatient  Hold this AM's lantus dose as she is NPO for RHC.   - OARRS reviewed, cont. Home gabapentin 300mg BID      HLD  PAD  - c/w asa 81mg daily, atorva 40mg daily     C/f RENZO  - reported increased daytime fatigue  - desats on RA in-house while sleeping  - has sleep medicine referral placed    Constipation  - cont. Bowel regimen  - encourage frequent ambulation       DVT ppx: subQ heparin  Emergency contacts: Boyfrienterrie Rousseau #[phone]  Brother Luis Squires #[phone]  DISPO: previously received HHC w/[facility]oted; likely resume at discharge   - HF navigator following, established care here  - discharge home pending stabilization of Cr and RHC    All labs, vital signs, tests & imaging results, and medications were reviewed.  Patient seen and discussed with Dr. Pizarro    Code Status:  Full Code    Jose Rojas PA-C

## 2025-01-18 LAB
GLUCOSE BLD MANUAL STRIP-MCNC: 122 MG/DL (ref 74–99)
GLUCOSE BLD MANUAL STRIP-MCNC: 149 MG/DL (ref 74–99)
GLUCOSE BLD MANUAL STRIP-MCNC: 207 MG/DL (ref 74–99)

## 2025-01-18 PROCEDURE — 2500000001 HC RX 250 WO HCPCS SELF ADMINISTERED DRUGS (ALT 637 FOR MEDICARE OP): Performed by: STUDENT IN AN ORGANIZED HEALTH CARE EDUCATION/TRAINING PROGRAM

## 2025-01-18 PROCEDURE — 1200000002 HC GENERAL ROOM WITH TELEMETRY DAILY

## 2025-01-18 PROCEDURE — 2500000001 HC RX 250 WO HCPCS SELF ADMINISTERED DRUGS (ALT 637 FOR MEDICARE OP): Performed by: NURSE PRACTITIONER

## 2025-01-18 PROCEDURE — 99232 SBSQ HOSP IP/OBS MODERATE 35: CPT

## 2025-01-18 PROCEDURE — 2500000004 HC RX 250 GENERAL PHARMACY W/ HCPCS (ALT 636 FOR OP/ED): Performed by: NURSE PRACTITIONER

## 2025-01-18 PROCEDURE — 2500000001 HC RX 250 WO HCPCS SELF ADMINISTERED DRUGS (ALT 637 FOR MEDICARE OP): Performed by: PHYSICIAN ASSISTANT

## 2025-01-18 PROCEDURE — 51702 INSERT TEMP BLADDER CATH: CPT

## 2025-01-18 PROCEDURE — 82947 ASSAY GLUCOSE BLOOD QUANT: CPT

## 2025-01-18 PROCEDURE — 2500000001 HC RX 250 WO HCPCS SELF ADMINISTERED DRUGS (ALT 637 FOR MEDICARE OP)

## 2025-01-18 PROCEDURE — 2500000002 HC RX 250 W HCPCS SELF ADMINISTERED DRUGS (ALT 637 FOR MEDICARE OP, ALT 636 FOR OP/ED): Performed by: STUDENT IN AN ORGANIZED HEALTH CARE EDUCATION/TRAINING PROGRAM

## 2025-01-18 PROCEDURE — 2500000002 HC RX 250 W HCPCS SELF ADMINISTERED DRUGS (ALT 637 FOR MEDICARE OP, ALT 636 FOR OP/ED): Performed by: NURSE PRACTITIONER

## 2025-01-18 PROCEDURE — 99232 SBSQ HOSP IP/OBS MODERATE 35: CPT | Performed by: INTERNAL MEDICINE

## 2025-01-18 RX ADMIN — GABAPENTIN 300 MG: 300 CAPSULE ORAL at 20:02

## 2025-01-18 RX ADMIN — AMLODIPINE BESYLATE 10 MG: 10 TABLET ORAL at 09:55

## 2025-01-18 RX ADMIN — HYDRALAZINE HYDROCHLORIDE 50 MG: 50 TABLET ORAL at 20:02

## 2025-01-18 RX ADMIN — INSULIN GLARGINE 18 UNITS: 100 INJECTION, SOLUTION SUBCUTANEOUS at 22:12

## 2025-01-18 RX ADMIN — ATORVASTATIN CALCIUM 40 MG: 40 TABLET, FILM COATED ORAL at 20:02

## 2025-01-18 RX ADMIN — HYDRALAZINE HYDROCHLORIDE 50 MG: 50 TABLET ORAL at 09:56

## 2025-01-18 RX ADMIN — NEPHROCAP 1 CAPSULE: 1 CAP ORAL at 09:56

## 2025-01-18 RX ADMIN — ISOSORBIDE DINITRATE 10 MG: 10 TABLET ORAL at 16:04

## 2025-01-18 RX ADMIN — HEPARIN SODIUM 7500 UNITS: 5000 INJECTION INTRAVENOUS; SUBCUTANEOUS at 22:12

## 2025-01-18 RX ADMIN — HEPARIN SODIUM 7500 UNITS: 5000 INJECTION INTRAVENOUS; SUBCUTANEOUS at 14:57

## 2025-01-18 RX ADMIN — GABAPENTIN 300 MG: 300 CAPSULE ORAL at 09:55

## 2025-01-18 RX ADMIN — OXYBUTYNIN CHLORIDE 5 MG: 5 TABLET ORAL at 09:56

## 2025-01-18 RX ADMIN — ASPIRIN 81 MG: 81 TABLET, CHEWABLE ORAL at 09:55

## 2025-01-18 RX ADMIN — FERROUS SULFATE TAB 325 MG (65 MG ELEMENTAL FE) 325 MG: 325 (65 FE) TAB at 09:56

## 2025-01-18 RX ADMIN — HEPARIN SODIUM 7500 UNITS: 5000 INJECTION INTRAVENOUS; SUBCUTANEOUS at 06:55

## 2025-01-18 RX ADMIN — OXYBUTYNIN CHLORIDE 5 MG: 5 TABLET ORAL at 20:02

## 2025-01-18 RX ADMIN — ISOSORBIDE DINITRATE 10 MG: 10 TABLET ORAL at 20:02

## 2025-01-18 RX ADMIN — OXYBUTYNIN CHLORIDE 5 MG: 5 TABLET ORAL at 14:57

## 2025-01-18 RX ADMIN — FOLIC ACID 1 MG: 1 TABLET ORAL at 09:56

## 2025-01-18 RX ADMIN — ISOSORBIDE DINITRATE 10 MG: 10 TABLET ORAL at 09:55

## 2025-01-18 RX ADMIN — HYDRALAZINE HYDROCHLORIDE 50 MG: 50 TABLET ORAL at 16:03

## 2025-01-18 ASSESSMENT — COGNITIVE AND FUNCTIONAL STATUS - GENERAL
DAILY ACTIVITIY SCORE: 21
DRESSING REGULAR UPPER BODY CLOTHING: A LITTLE
CLIMB 3 TO 5 STEPS WITH RAILING: A LITTLE
MOBILITY SCORE: 20
STANDING UP FROM CHAIR USING ARMS: A LITTLE
HELP NEEDED FOR BATHING: A LITTLE
MOVING TO AND FROM BED TO CHAIR: A LITTLE
DAILY ACTIVITIY SCORE: 20
MOVING TO AND FROM BED TO CHAIR: A LITTLE
TOILETING: A LITTLE
STANDING UP FROM CHAIR USING ARMS: A LITTLE
MOBILITY SCORE: 20
HELP NEEDED FOR BATHING: A LITTLE
WALKING IN HOSPITAL ROOM: A LITTLE
DRESSING REGULAR LOWER BODY CLOTHING: A LITTLE
CLIMB 3 TO 5 STEPS WITH RAILING: A LITTLE
DRESSING REGULAR LOWER BODY CLOTHING: A LITTLE
WALKING IN HOSPITAL ROOM: A LITTLE
TOILETING: A LITTLE

## 2025-01-18 ASSESSMENT — PAIN SCALES - GENERAL
PAINLEVEL_OUTOF10: 0 - NO PAIN
PAINLEVEL_OUTOF10: 0 - NO PAIN

## 2025-01-18 ASSESSMENT — PAIN - FUNCTIONAL ASSESSMENT
PAIN_FUNCTIONAL_ASSESSMENT: 0-10
PAIN_FUNCTIONAL_ASSESSMENT: 0-10

## 2025-01-18 NOTE — PROGRESS NOTES
Subjective:  No complaints    Today in brief:  - RHC with overall improved filling pressures. Hold diuresis.    Objective:  Vitals:    01/18/25 0357   BP: 122/59   Pulse: 74   Resp: 18   Temp: 36.5 °C (97.7 °F)   SpO2: 94%     Weight         1/14/2025  0630 1/15/2025  0500 1/16/2025  0607 1/17/2025  0500 1/18/2025  0357    Weight: 100 kg (221 lb 1.9 oz) 102 kg (225 lb 1.4 oz) 102 kg (225 lb 12 oz) 102 kg (225 lb 12 oz) 104 kg (229 lb 0.9 oz)            Intake/Output Summary (Last 24 hours) at 1/18/2025 0805  Last data filed at 1/18/2025 0632  Gross per 24 hour   Intake 240 ml   Output 1103 ml   Net -863 ml     Recent Results (from the past 24 hours)   POCT GLUCOSE    Collection Time: 01/17/25  5:38 PM   Result Value Ref Range    POCT Glucose 133 (H) 74 - 99 mg/dL   CBC    Collection Time: 01/17/25  7:16 PM   Result Value Ref Range    WBC 7.2 4.4 - 11.3 x10*3/uL    nRBC 0.0 0.0 - 0.0 /100 WBCs    RBC 3.18 (L) 4.00 - 5.20 x10*6/uL    Hemoglobin 9.4 (L) 12.0 - 16.0 g/dL    Hematocrit 31.7 (L) 36.0 - 46.0 %     80 - 100 fL    MCH 29.6 26.0 - 34.0 pg    MCHC 29.7 (L) 32.0 - 36.0 g/dL    RDW 14.5 11.5 - 14.5 %    Platelets 374 150 - 450 x10*3/uL   Magnesium    Collection Time: 01/17/25  7:16 PM   Result Value Ref Range    Magnesium 2.76 (H) 1.60 - 2.40 mg/dL   Renal function panel    Collection Time: 01/17/25  7:16 PM   Result Value Ref Range    Glucose 152 (H) 74 - 99 mg/dL    Sodium 140 136 - 145 mmol/L    Potassium 4.3 3.5 - 5.3 mmol/L    Chloride 101 98 - 107 mmol/L    Bicarbonate 27 21 - 32 mmol/L    Anion Gap 16 10 - 20 mmol/L    Urea Nitrogen 96 (HH) 6 - 23 mg/dL    Creatinine 4.38 (H) 0.50 - 1.05 mg/dL    eGFR 11 (L) >60 mL/min/1.73m*2    Calcium 8.9 8.6 - 10.6 mg/dL    Phosphorus 7.0 (H) 2.5 - 4.9 mg/dL    Albumin 3.2 (L) 3.4 - 5.0 g/dL   POCT GLUCOSE    Collection Time: 01/17/25  9:06 PM   Result Value Ref Range    POCT Glucose 241 (H) 74 - 99 mg/dL   POCT GLUCOSE    Collection Time: 01/18/25  6:05 AM    Result Value Ref Range    POCT Glucose 149 (H) 74 - 99 mg/dL     Inpatient Medications:  Scheduled medications   Medication Dose Route Frequency    amLODIPine  10 mg oral Daily    aspirin  81 mg oral Daily    atorvastatin  40 mg oral Nightly    capsaicin   Topical TID    ferrous sulfate (325 mg ferrous sulfate)  65 mg of iron oral Daily with breakfast    folic acid  1 mg oral Daily    gabapentin  300 mg oral BID    heparin (porcine)  7,500 Units subcutaneous q8h NEHEMIAS    hydrALAZINE  50 mg oral TID    insulin glargine  18 Units subcutaneous q24h    insulin lispro  0-10 Units subcutaneous TID AC    isosorbide dinitrate  10 mg oral TID    oxybutynin  5 mg oral TID    polyethylene glycol  17 g oral Daily    sennosides-docusate sodium  2 tablet oral BID    [Held by provider] torsemide  60 mg oral BID    vitamin B complex-vitamin C-folic acid  1 capsule oral Daily     PRN medications   Medication    acetaminophen    albuterol    bisacodyl    dextrose    dextrose    glucagon    glucagon    oxygen    oxygen     Continuous Medications   Medication Dose Last Rate       Telemetry 1/18/2025 (personally reviewed): SR 70s    Physical exam:  General: NAD  Head/ neck: atraumatic  Cardiac: RRR, regular S1 S2 , no murmur, no rub, no gallop  Pulm: No WOB  Vascular: Radial 2+ bilaterally  GI: Non distended  Extremities: trace LE edema   Neuro: no focal neuro deficits   Psych: appropriate mood and behavior   Skin: warm and dry     Assessment/Plan   Nuris Squires is a 59 y.o. female presenting with PMH of poorly controlled insulin dependent type 2 diabetes c/b neuropathy, hyperlipidemia, PAD, CKD stage IV, hypertension, multiple foot infections s/p partial R great toe amputation, fall w/L femur fracture s/p ORIF (4/2024) that presents to the emergency department today for shortness of breath x1 day. Admitted under HHVI Service for further management of acute decompensated heart failure and HTN Urgency.     Acute diastolic heart  failure  Chest tightness, resolved  - likely 2/2 uncontrolled HTN  - TTE (1/2024): EF 70-75%  - TTE 1/3/2025: EF 69%, grade II diastolic filling, mod MR, mod TR, small pericardial effusion  - Negative stress 10/2019  - CXR: pulm vascular congestion BL, small bibasilar pleural effusions  - Last BNP (1/5) 161 (376 on admit iso obesity)  - HS trop 21, no ischemic changes on ECG, no c/f ACS  - Dry weight ~100kg  - Admit weight: 107kg  - Daily weight: 104 (102, 102, 102, 100, 102,104, 108, 108, 107, 106 kg)  - RHC (1/8): reviewed tracings with Dr. Mckee, numbers are questionable but RVEDP 20, PA 75/25, unclear wedge  - of note, pharmacy verified patient on PO lasix 20mg daily at home (?for unclear reasons, presumably prescribed by PCP for leg swelling)-> per patient, was just started on it at home  - s/p successful diuresis w/IV lasix gtt at 15mg/hr and PRN metolazone  - 1/14 held Torsemide per nephrology.   - s/p RHC 1/17: RA 8, PA 51/15, PCWP 14, CO/CI 18.2/9 ; much improved pressures compared to prior.  - Given improvement in volume status per RHC, hold further diuresis.  - GDMT  - Holding empa iso GFR <20  - avoid ACEI/ARB/ARNI, MRA for now d/t elevated Cr  - cont. Hydral 50mg TID, isordil 10mg TID ( add hold parameters if SBP <120)  - HF navigator following, establishing care here  - Daily standing weights, strict I&O's, cardiac diet     CKD IV, worsening  Urinary retention s/p adhikari placement 1/15  - Cr last year 1.7-5.3, worsening since 2022  - Admit Cr 2.34  - Daily Cr 4.38H, 4.03 (4.40, 4.28, 4.06x2, 3.89, 3.85, 3.61, 3.73)  - urine lytes, osmolality reviewed, FENa 5.6% suggesting intrinsic cause  - Nephrology following:   - Hold torsemide.   - Cr Elevation 2/2 aggressive diuresis & BP control, and rising bicarb/contraction alkalosis indicates her beng dry.   - maintain SBP >120. Allow permissive HTN to SBP 140s. Discontinue amlodipine  - Strict I&Os  - 1/15 renal US: Borderline increased renal cortical  echogenicity bilaterally which may reflect medical renal disease, without evidence of obstructive, bladder decompressed by adhikari, no acute process   - 1/15 UA no infection, + protein, +glucose  - 1/16 urology consult:   -Suspected renal dysfunction secondary to intrinsic process.   - Low concern for postobstructive process.   - Will need outpatient urology referral.     Anemia of unclear origin  - unremarkable colonoscopy 2019, rec'd to repeat in 7yrs for screening  - Baseline Hgb ~9-11  - Hgb today: 9.4 (9.9, ..., 11.1 on admit)  - irons studies: sat 33%WNL, folate/ferritin/vit. B12 WNL> s/p IV iron 200mg x5 days, now on oral iron supplement  - no overt signs of bleeding    Paraesthesias, unspecified  - c/o paraesthesias around mouth & fingertips, new since admit  - capsaicin cream for hands  - cont. OP follow-up w/PCP (scheduled 2/20)  - Folate/ferritin/vit. B12 WNL  - 1/17: Na, Ca, K WNL     Acute hypoxic respiratory failure iso of ADHF exacerbation, improving  Pulmonary HTN  - p.ox initially at 61% but improved rapidly with placement of nasal cannula  - She was requiring 6 L of oxygen, BiPAP briefly due to the concern for acute flash pulmonary edema, received SL nitro and she was able to wean off of BiPAP  -  weaned from 2L O2 to RA, however, desats at night, c/f RENZO (see below)  - VQ scan (1/9; for pulm HTN given high PA on RHC): low probability of PE  - Flu A/B/RSV/COVID negative  - pulmonary toileting w/incentive spirometry   - Saturating well on RA  with no noted increased WOB.      HTN urgency, resolved  - BP on on admit: 220/98  - SBP improved to 143 after resumption of home meds and s/p IVP Hydral 5mg x1  - Last 24 hour SBP range: 100-130s  - BPs labile, clonidine weaned off on 1/5, intermittent headaches w/isordil (tolerating 10mg TID)  - Discontinued amlodipine  - 1/16 nephrology requested permissive hypertension to help with perfusion    Morbid Obesity  Insulin dependent Type 2 diabetes, Poorly  controlled  Neuropathy  - Follows with clinical pharmacy closely/PCP  - Home regimen: Mounjaro 2.5mg weekly, basaglar 32 units every morning, admelog SS with meals  - Home gabapentin 300mg BID   - c/w lantus 18 units every morning (decreased from 32U iso AM BG of 62) and SSI #2 while inpatient    HLD  PAD  - c/w asa 81mg daily, atorva 40mg daily      C/f RENZO  - reported increased daytime fatigue  - desats on RA in-house while sleeping  - referral sent to sleep medicine      Constipation  - cont. Bowel regimen  - encourage frequent ambulation        DVT ppx: subcutaneous heparin    DISPO:   - Pending renal insufficiency improvement and volume stability.  - [facility] on eventual discharge    Code status: Full Code    Patient seen and discussed with Dr. Ermelinda Ansari.  Carmen Choudhury PA-C

## 2025-01-18 NOTE — CARE PLAN
The patient's goals for the shift include sit up to the chair    The clinical goals for the shift include remain hds    Over the shift, the patient remained hds; plan to continue adhikari with accurate output; continue to monitor response to medications.

## 2025-01-18 NOTE — PROGRESS NOTES
NEPHROLOGY FOLLOW UP NOTE    Nuris Squires   59 y.o.      MRN/Room: [id number]    Subjective: Seen at the bedside in no distress. RHC yesterday showed increased filling pressures. Patient appears euvolemic on exam but notes significant thirst. Noted significant un-happiness with prolonged hospital stay.       Objective:     BP yesterday ranged from 104/51- 176/92.         RHC (17-Jan-25)   RA: 8  PA: 51/15  PCWP: 14  CO/CI: 18.2/9   PA sat 81 %   IVC sat 86%   Right subclavian sat 79  AO 98%         Physical Exam  Vitals reviewed.   Constitutional:       General: She is not in acute distress.  HENT:      Head: Normocephalic and atraumatic.   Eyes:      Extraocular Movements: Extraocular movements intact.      Conjunctiva/sclera: Conjunctivae normal.   Cardiovascular:      Rate and Rhythm: Normal rate and regular rhythm.      Heart sounds: Normal heart sounds.   Pulmonary:      Effort: Pulmonary effort is normal.      Breath sounds: Normal breath sounds.   Abdominal:      General: There is no abdominal bruit.      Palpations: There is no fluid wave.      Tenderness: There is no abdominal tenderness. There is no right CVA tenderness, left CVA tenderness, guarding or rebound.   Musculoskeletal:      Right lower leg: No edema.      Left lower leg: No edema.   Skin:     General: Skin is warm and dry.   Neurological:      Mental Status: She is alert and oriented to person, place, and time.       Imaging:     US renal complete    Result Date: 1/15/2025  Borderline increased renal cortical echogenicity bilaterally which may reflect medical renal disease, without evidence of obstructive uropathy.   Signed by: Kapil Bass 1/15/2025 2:27 PM Dictation workstation:   [id number]    NM Lung perfusion with spect    Result Date: 1/9/2025  1. No wedge-shaped segmental or subsegmental perfusion defect in both lungs to suggest acute pulmonary embolism (low probability).   The interpretation above is based on modified  PIOPED II and PISAPED criteria.   I personally reviewed the images/study and I agree with the findings as stated by Lily Mane MD.  This study was interpreted at [facility], [place], [place].   MACRO: None   Signed by: Christiano Brunson 1/9/2025 10:13 AM Dictation workstation:   [id number]    XR chest 1 view    Result Date: 1/2/2025  Constellation of findings is suggestive of congestive heart failure. Signed by Rupesh Borges MD    BI US breast limited left    Result Date: 12/26/2024  Interval resolution of the previously described finding. Patient can return to screening.   BI-RADS CATEGORY: BI-RADS Category:  1 Negative. Recommendation:  Annual Screening. Recommended Date:  1 Year. Laterality:  Bilateral.   For any future breast imaging appointments, please call [phone]).     MACRO: None   Signed by: Jax Hagan 12/26/2024 11:36 AM Dictation workstation:   [id number]         Meds:   amLODIPine, 10 mg, Daily  aspirin, 81 mg, Daily  atorvastatin, 40 mg, Nightly  capsaicin, , TID  ferrous sulfate (325 mg ferrous sulfate), 65 mg of iron, Daily with breakfast  folic acid, 1 mg, Daily  gabapentin, 300 mg, BID  heparin (porcine), 7,500 Units, q8h NEHEMIAS  hydrALAZINE, 50 mg, TID  insulin glargine, 18 Units, q24h  insulin lispro, 0-10 Units, TID AC  isosorbide dinitrate, 10 mg, TID  oxybutynin, 5 mg, TID  polyethylene glycol, 17 g, Daily  sennosides-docusate sodium, 2 tablet, BID  [Held by provider] torsemide, 60 mg, BID  vitamin B complex-vitamin C-folic acid, 1 capsule, Daily           acetaminophen, 650 mg, q6h PRN  albuterol, 2.5 mg, q6h PRN  bisacodyl, 10 mg, Daily PRN  dextrose, 12.5 g, q15 min PRN  dextrose, 25 g, q15 min PRN  glucagon, 1 mg, q15 min PRN  glucagon, 1 mg, q15 min PRN  oxygen, , Continuous PRN - O2/gases  oxygen, , Continuous PRN - O2/gases        Vitals:    01/18/25 0755   BP: 107/69   Pulse:    Resp:    Temp: 36.4 °C (97.5 °F)   SpO2: 93%      Vitals:     01/14/25 0630   Weight: 100 kg (221 lb 1.9 oz)         Intake/Output Summary (Last 24 hours) at 1/18/2025 1117  Last data filed at 1/18/2025 0632  Gross per 24 hour   Intake 240 ml   Output 753 ml   Net -513 ml     Weight change: 1.5 kg (3 lb 4.9 oz)    3 Day Weight Change: 0.6 kg (1 lb 5.2 oz) per day        Blood Labs:  Results for orders placed or performed during the hospital encounter of 01/02/25 (from the past 24 hours)   POCT GLUCOSE   Result Value Ref Range    POCT Glucose 133 (H) 74 - 99 mg/dL   CBC   Result Value Ref Range    WBC 7.2 4.4 - 11.3 x10*3/uL    nRBC 0.0 0.0 - 0.0 /100 WBCs    RBC 3.18 (L) 4.00 - 5.20 x10*6/uL    Hemoglobin 9.4 (L) 12.0 - 16.0 g/dL    Hematocrit 31.7 (L) 36.0 - 46.0 %     80 - 100 fL    MCH 29.6 26.0 - 34.0 pg    MCHC 29.7 (L) 32.0 - 36.0 g/dL    RDW 14.5 11.5 - 14.5 %    Platelets 374 150 - 450 x10*3/uL   Magnesium   Result Value Ref Range    Magnesium 2.76 (H) 1.60 - 2.40 mg/dL   Renal function panel   Result Value Ref Range    Glucose 152 (H) 74 - 99 mg/dL    Sodium 140 136 - 145 mmol/L    Potassium 4.3 3.5 - 5.3 mmol/L    Chloride 101 98 - 107 mmol/L    Bicarbonate 27 21 - 32 mmol/L    Anion Gap 16 10 - 20 mmol/L    Urea Nitrogen 96 (HH) 6 - 23 mg/dL    Creatinine 4.38 (H) 0.50 - 1.05 mg/dL    eGFR 11 (L) >60 mL/min/1.73m*2    Calcium 8.9 8.6 - 10.6 mg/dL    Phosphorus 7.0 (H) 2.5 - 4.9 mg/dL    Albumin 3.2 (L) 3.4 - 5.0 g/dL   POCT GLUCOSE   Result Value Ref Range    POCT Glucose 241 (H) 74 - 99 mg/dL   POCT GLUCOSE   Result Value Ref Range    POCT Glucose 149 (H) 74 - 99 mg/dL          ASSESSMENT:  Nuris Squires is a  59 y.o.  Year old , with PMHx of  poorly controlled insulin dependent type 2 diabetes c/b neuropathy, hyperlipidemia, PAD, CKD stage IV, hypertension, multiple foot infections s/p partial R great toe amputation, fall w/L femur fracture s/p ORIF (4/2024) that presents to the emergency department for SOB. Admitted under HHVI Service for further  management of acute ADHF and HTN Urgency.     RHC on 01/17 consistent with euvolemia. Had multiple bouts of hypotension and rapidly fluctuating BP on January 17th. /83 -> 116/55 from 0812 to 1454, 176/92 -> 104/51 1528 to 1655, MAP's otherwise have been in the 70's to low 80's. Per patient home BP's consistently in the 150's. Hypotension and recurrent BP fluctuations likely contributing to rising creatine due to hypotensive insult. Suggest permissive HTN, with BP's into the 140's closer to he baseline at home     #Normotensive ischemic injury with CRS   #VENKAT on Proteinuric CKD IV, nonoliguric, Stage II Liely 2/2 Diabetic Kidney disease   #Acute on Chronic Urinary retention   #C/f neurogenic bladder 2/2 DMII   - Baseline creatinine: 1.68   - Creatine to 4.38 from 4.03  - Etiology: Likely Multifactorial (including urinary retention and Normotensive ischemic injury)   - FeUrea: 32.5 % suggestive of pre-renal disease  - Clinical volume status: euvolemic  - Electrolytes (Na, K, Ca, ) are stable. Magnesium and Phos remain elevated  - Acid base status: Last Ph 7.32 by VBG on January 2nd    - bicarb has been up trending was 22 on admission and 29 on 1/14   - Recurrent BP fluctuations and hypotension     Recommendations:  - Maintain Louis Catheter  - Discontinue amlodipine  - Holding parameters for hydralazine below 120 systolic   - Indication for dialysis:  No   - Avoid nephrotoxins, contrast if possible  - c/w Holding Torsemide  - Continue to maintain SBP over 120, and avoid large fluctuations in BP. SBP's in the 140's are ok for now   - strict Is/Os to evaluate patient's fluid status   - Renal dosing for medications for latest eGFR, follow medication trough as appropriate  - Avoid hypotension/rapid fluctuations in BPs    Michael Farmer MD  Nephrology Resident  24 hour Renal Pager - [phone]    Discussed with attending nephrologist

## 2025-01-19 LAB
ALBUMIN SERPL BCP-MCNC: 2.9 G/DL (ref 3.4–5)
ALBUMIN SERPL BCP-MCNC: 3.5 G/DL (ref 3.4–5)
ANION GAP SERPL CALC-SCNC: 16 MMOL/L (ref 10–20)
ANION GAP SERPL CALC-SCNC: 18 MMOL/L (ref 10–20)
BUN SERPL-MCNC: 86 MG/DL (ref 6–23)
BUN SERPL-MCNC: 90 MG/DL (ref 6–23)
CALCIUM SERPL-MCNC: 8.3 MG/DL (ref 8.6–10.6)
CALCIUM SERPL-MCNC: 9.2 MG/DL (ref 8.6–10.6)
CHLORIDE SERPL-SCNC: 101 MMOL/L (ref 98–107)
CHLORIDE SERPL-SCNC: 102 MMOL/L (ref 98–107)
CO2 SERPL-SCNC: 26 MMOL/L (ref 21–32)
CO2 SERPL-SCNC: 26 MMOL/L (ref 21–32)
CREAT SERPL-MCNC: 4.24 MG/DL (ref 0.5–1.05)
CREAT SERPL-MCNC: 4.48 MG/DL (ref 0.5–1.05)
EGFRCR SERPLBLD CKD-EPI 2021: 11 ML/MIN/1.73M*2
EGFRCR SERPLBLD CKD-EPI 2021: 11 ML/MIN/1.73M*2
ERYTHROCYTE [DISTWIDTH] IN BLOOD BY AUTOMATED COUNT: 14.5 % (ref 11.5–14.5)
ERYTHROCYTE [DISTWIDTH] IN BLOOD BY AUTOMATED COUNT: 14.6 % (ref 11.5–14.5)
GLUCOSE BLD MANUAL STRIP-MCNC: 139 MG/DL (ref 74–99)
GLUCOSE BLD MANUAL STRIP-MCNC: 146 MG/DL (ref 74–99)
GLUCOSE BLD MANUAL STRIP-MCNC: 150 MG/DL (ref 74–99)
GLUCOSE BLD MANUAL STRIP-MCNC: 187 MG/DL (ref 74–99)
GLUCOSE SERPL-MCNC: 130 MG/DL (ref 74–99)
GLUCOSE SERPL-MCNC: 179 MG/DL (ref 74–99)
HCT VFR BLD AUTO: 29.7 % (ref 36–46)
HCT VFR BLD AUTO: 30.9 % (ref 36–46)
HGB BLD-MCNC: 9.3 G/DL (ref 12–16)
HGB BLD-MCNC: 9.5 G/DL (ref 12–16)
MAGNESIUM SERPL-MCNC: 2.81 MG/DL (ref 1.6–2.4)
MAGNESIUM SERPL-MCNC: 2.86 MG/DL (ref 1.6–2.4)
MCH RBC QN AUTO: 29.5 PG (ref 26–34)
MCH RBC QN AUTO: 30 PG (ref 26–34)
MCHC RBC AUTO-ENTMCNC: 30.7 G/DL (ref 32–36)
MCHC RBC AUTO-ENTMCNC: 31.3 G/DL (ref 32–36)
MCV RBC AUTO: 96 FL (ref 80–100)
MCV RBC AUTO: 96 FL (ref 80–100)
NRBC BLD-RTO: 0 /100 WBCS (ref 0–0)
NRBC BLD-RTO: 0 /100 WBCS (ref 0–0)
PHOSPHATE SERPL-MCNC: 6.5 MG/DL (ref 2.5–4.9)
PHOSPHATE SERPL-MCNC: 6.8 MG/DL (ref 2.5–4.9)
PLATELET # BLD AUTO: 372 X10*3/UL (ref 150–450)
PLATELET # BLD AUTO: 397 X10*3/UL (ref 150–450)
POTASSIUM SERPL-SCNC: 4.2 MMOL/L (ref 3.5–5.3)
POTASSIUM SERPL-SCNC: 4.7 MMOL/L (ref 3.5–5.3)
RBC # BLD AUTO: 3.1 X10*6/UL (ref 4–5.2)
RBC # BLD AUTO: 3.22 X10*6/UL (ref 4–5.2)
SODIUM SERPL-SCNC: 139 MMOL/L (ref 136–145)
SODIUM SERPL-SCNC: 141 MMOL/L (ref 136–145)
WBC # BLD AUTO: 9.1 X10*3/UL (ref 4.4–11.3)
WBC # BLD AUTO: 9.3 X10*3/UL (ref 4.4–11.3)

## 2025-01-19 PROCEDURE — 83735 ASSAY OF MAGNESIUM: CPT | Performed by: NURSE PRACTITIONER

## 2025-01-19 PROCEDURE — 80069 RENAL FUNCTION PANEL: CPT | Performed by: NURSE PRACTITIONER

## 2025-01-19 PROCEDURE — 2500000001 HC RX 250 WO HCPCS SELF ADMINISTERED DRUGS (ALT 637 FOR MEDICARE OP): Performed by: NURSE PRACTITIONER

## 2025-01-19 PROCEDURE — 51702 INSERT TEMP BLADDER CATH: CPT

## 2025-01-19 PROCEDURE — 85027 COMPLETE CBC AUTOMATED: CPT | Performed by: NURSE PRACTITIONER

## 2025-01-19 PROCEDURE — 36415 COLL VENOUS BLD VENIPUNCTURE: CPT | Performed by: NURSE PRACTITIONER

## 2025-01-19 PROCEDURE — 2500000004 HC RX 250 GENERAL PHARMACY W/ HCPCS (ALT 636 FOR OP/ED): Performed by: NURSE PRACTITIONER

## 2025-01-19 PROCEDURE — 2500000001 HC RX 250 WO HCPCS SELF ADMINISTERED DRUGS (ALT 637 FOR MEDICARE OP): Performed by: STUDENT IN AN ORGANIZED HEALTH CARE EDUCATION/TRAINING PROGRAM

## 2025-01-19 PROCEDURE — 1200000002 HC GENERAL ROOM WITH TELEMETRY DAILY

## 2025-01-19 PROCEDURE — 99232 SBSQ HOSP IP/OBS MODERATE 35: CPT | Performed by: INTERNAL MEDICINE

## 2025-01-19 PROCEDURE — 2500000002 HC RX 250 W HCPCS SELF ADMINISTERED DRUGS (ALT 637 FOR MEDICARE OP, ALT 636 FOR OP/ED): Performed by: STUDENT IN AN ORGANIZED HEALTH CARE EDUCATION/TRAINING PROGRAM

## 2025-01-19 PROCEDURE — 2500000002 HC RX 250 W HCPCS SELF ADMINISTERED DRUGS (ALT 637 FOR MEDICARE OP, ALT 636 FOR OP/ED): Performed by: NURSE PRACTITIONER

## 2025-01-19 PROCEDURE — 82947 ASSAY GLUCOSE BLOOD QUANT: CPT

## 2025-01-19 PROCEDURE — 2500000001 HC RX 250 WO HCPCS SELF ADMINISTERED DRUGS (ALT 637 FOR MEDICARE OP): Performed by: PHYSICIAN ASSISTANT

## 2025-01-19 RX ADMIN — GABAPENTIN 300 MG: 300 CAPSULE ORAL at 09:52

## 2025-01-19 RX ADMIN — HEPARIN SODIUM 7500 UNITS: 5000 INJECTION INTRAVENOUS; SUBCUTANEOUS at 06:33

## 2025-01-19 RX ADMIN — ISOSORBIDE DINITRATE 10 MG: 10 TABLET ORAL at 09:52

## 2025-01-19 RX ADMIN — ASPIRIN 81 MG: 81 TABLET, CHEWABLE ORAL at 09:52

## 2025-01-19 RX ADMIN — FOLIC ACID 1 MG: 1 TABLET ORAL at 09:52

## 2025-01-19 RX ADMIN — GABAPENTIN 300 MG: 300 CAPSULE ORAL at 20:07

## 2025-01-19 RX ADMIN — NEPHROCAP 1 CAPSULE: 1 CAP ORAL at 09:52

## 2025-01-19 RX ADMIN — HYDRALAZINE HYDROCHLORIDE 50 MG: 50 TABLET ORAL at 14:32

## 2025-01-19 RX ADMIN — ISOSORBIDE DINITRATE 10 MG: 10 TABLET ORAL at 14:32

## 2025-01-19 RX ADMIN — FERROUS SULFATE TAB 325 MG (65 MG ELEMENTAL FE) 325 MG: 325 (65 FE) TAB at 09:52

## 2025-01-19 RX ADMIN — HEPARIN SODIUM 7500 UNITS: 5000 INJECTION INTRAVENOUS; SUBCUTANEOUS at 21:07

## 2025-01-19 RX ADMIN — OXYBUTYNIN CHLORIDE 5 MG: 5 TABLET ORAL at 09:52

## 2025-01-19 RX ADMIN — ATORVASTATIN CALCIUM 40 MG: 40 TABLET, FILM COATED ORAL at 20:07

## 2025-01-19 RX ADMIN — HYDRALAZINE HYDROCHLORIDE 50 MG: 50 TABLET ORAL at 20:07

## 2025-01-19 RX ADMIN — OXYBUTYNIN CHLORIDE 5 MG: 5 TABLET ORAL at 20:07

## 2025-01-19 RX ADMIN — INSULIN GLARGINE 18 UNITS: 100 INJECTION, SOLUTION SUBCUTANEOUS at 22:09

## 2025-01-19 RX ADMIN — HEPARIN SODIUM 7500 UNITS: 5000 INJECTION INTRAVENOUS; SUBCUTANEOUS at 14:32

## 2025-01-19 RX ADMIN — HYDRALAZINE HYDROCHLORIDE 50 MG: 50 TABLET ORAL at 09:52

## 2025-01-19 RX ADMIN — ISOSORBIDE DINITRATE 10 MG: 10 TABLET ORAL at 18:46

## 2025-01-19 RX ADMIN — OXYBUTYNIN CHLORIDE 5 MG: 5 TABLET ORAL at 14:32

## 2025-01-19 ASSESSMENT — COGNITIVE AND FUNCTIONAL STATUS - GENERAL
DRESSING REGULAR LOWER BODY CLOTHING: A LITTLE
DRESSING REGULAR LOWER BODY CLOTHING: A LITTLE
MOBILITY SCORE: 20
HELP NEEDED FOR BATHING: A LITTLE
WALKING IN HOSPITAL ROOM: A LITTLE
TOILETING: A LITTLE
WALKING IN HOSPITAL ROOM: A LITTLE
MOVING TO AND FROM BED TO CHAIR: A LITTLE
DAILY ACTIVITIY SCORE: 21
DAILY ACTIVITIY SCORE: 21
TOILETING: A LITTLE
STANDING UP FROM CHAIR USING ARMS: A LITTLE
CLIMB 3 TO 5 STEPS WITH RAILING: A LITTLE
STANDING UP FROM CHAIR USING ARMS: A LITTLE
MOVING TO AND FROM BED TO CHAIR: A LITTLE
MOBILITY SCORE: 20
CLIMB 3 TO 5 STEPS WITH RAILING: A LITTLE
HELP NEEDED FOR BATHING: A LITTLE

## 2025-01-19 ASSESSMENT — PAIN SCALES - GENERAL: PAINLEVEL_OUTOF10: 0 - NO PAIN

## 2025-01-19 NOTE — PROGRESS NOTES
Subjective:  No CP or SOB. No complaints    Today in brief:  - Monitor renal function for improvement  - Encourage Po intake  - Continue permissive HTN    Objective:  Vitals:    01/19/25 0425   BP: 142/60   Pulse:    Resp:    Temp: 36.6 °C (97.9 °F)   SpO2: 94%     Weight         1/14/2025  0630 1/15/2025  0500 1/16/2025  0607 1/17/2025  0500 1/18/2025  0357    Weight: 100 kg (221 lb 1.9 oz) 102 kg (225 lb 1.4 oz) 102 kg (225 lb 12 oz) 102 kg (225 lb 12 oz) 104 kg (229 lb 0.9 oz)            Intake/Output Summary (Last 24 hours) at 1/19/2025 0717  Last data filed at 1/19/2025 0639  Gross per 24 hour   Intake --   Output 1475 ml   Net -1475 ml     Recent Results (from the past 24 hours)   POCT GLUCOSE    Collection Time: 01/18/25  6:12 PM   Result Value Ref Range    POCT Glucose 122 (H) 74 - 99 mg/dL   POCT GLUCOSE    Collection Time: 01/18/25 10:17 PM   Result Value Ref Range    POCT Glucose 207 (H) 74 - 99 mg/dL     Inpatient Medications:  Scheduled medications   Medication Dose Route Frequency    aspirin  81 mg oral Daily    atorvastatin  40 mg oral Nightly    capsaicin   Topical TID    ferrous sulfate (325 mg ferrous sulfate)  65 mg of iron oral Daily with breakfast    folic acid  1 mg oral Daily    gabapentin  300 mg oral BID    heparin (porcine)  7,500 Units subcutaneous q8h NEHEMIAS    hydrALAZINE  50 mg oral TID    insulin glargine  18 Units subcutaneous q24h    insulin lispro  0-10 Units subcutaneous TID AC    isosorbide dinitrate  10 mg oral TID    oxybutynin  5 mg oral TID    polyethylene glycol  17 g oral Daily    sennosides-docusate sodium  2 tablet oral BID    [Held by provider] torsemide  60 mg oral BID    vitamin B complex-vitamin C-folic acid  1 capsule oral Daily     PRN medications   Medication    acetaminophen    albuterol    bisacodyl    dextrose    dextrose    glucagon    glucagon    oxygen    oxygen     Continuous Medications   Medication Dose Last Rate       Telemetry 1/19/2025 (personally  reviewed): SR 70-80s, x1 7bt NSVT    Physical exam:  General: NAD  Head/ neck: atraumatic  Cardiac: RRR, regular S1 S2 , no murmur, no rub, no gallop  Pulm: CTA bilaterally with mildly diminished bases  Vascular: Radial 2+ bilaterally  GI: Non distended  Extremities: trace LE edema   Neuro: no focal neuro deficits   Psych: appropriate mood and behavior   Skin: warm and dry     Assessment/Plan   Nuris Squires is a 59 y.o. female presenting with PMH of poorly controlled insulin dependent type 2 diabetes c/b neuropathy, hyperlipidemia, PAD, CKD stage IV, hypertension, multiple foot infections s/p partial R great toe amputation, fall w/L femur fracture s/p ORIF (4/2024) that presents to the emergency department today for shortness of breath x1 day. Admitted under HHVI Service for further management of acute decompensated heart failure and HTN Urgency.     Acute decompensated diastolic heart failure  Chest tightness, resolved  - likely 2/2 uncontrolled HTN  - TTE (1/2024): EF 70-75%  - TTE 1/3/2025: EF 69%, grade II diastolic filling, mod MR, mod TR, small pericardial effusion  - Negative stress 10/2019  - CXR: pulm vascular congestion BL, small bibasilar pleural effusions  - Last BNP (1/5) 161 (376 on admit iso obesity)  - HS trop 21, no ischemic changes on ECG, no c/f ACS  - Dry weight ~100kg  - Admit weight: 107kg  - Daily weight: 104 (102, 102, 102, 100,... 106 kg)  - RHC (1/8): reviewed tracings with Dr. Mckee, numbers are questionable but RVEDP 20, PA 75/25, unclear wedge  - of note, pharmacy verified patient on PO lasix 20mg daily at home (?for unclear reasons, presumably prescribed by PCP for leg swelling)-> per patient, was just started on it at home  - s/p successful diuresis w/IV lasix gtt at 15mg/hr and PRN metolazone  - 1/14 held Torsemide per nephrology.   - s/p RHC 1/17: RA 8, PA 51/15, PCWP 14, CO/CI 18.2/9 ; much improved pressures compared to prior.  - Given improvement in volume status per RHC   1/18 and recurrently elevated Cr, hold further diuresis.  - GDMT  - Holding empa iso GFR <20  - avoid ACEI/ARB/ARNI, MRA for now d/t elevated Cr  - cont. Hydral 50mg TID, isordil 10mg TID ( hold parameters if SBP <120)  - HF navigator following, establishing care here  - Daily standing weights, strict I&O's, cardiac diet     Acute hypoxic respiratory failure iso of ADHF exacerbation, improving  Pulmonary HTN  - p.ox initially at 61% but improved rapidly with placement of nasal cannula  - She was requiring 6 L of oxygen, BiPAP briefly due to the concern for acute flash pulmonary edema, received SL nitro and she was able to wean off of BiPAP  -  weaned from 2L O2 to RA, however, desats at night, c/f RENZO (see below)  - VQ scan (1/9; for pulm HTN given high PA on RHC): low probability of PE  - Flu A/B/RSV/COVID negative  - pulmonary toileting w/incentive spirometry   - Saturating well on RA  with no noted increased WOB.     HTN urgency, resolved  - BP on on admit: 220/98  - SBP improved to 143 after resumption of home meds and s/p IVP Hydral 5mg x1  - Last 24 hour SBP range: 100-130s  - BPs labile, clonidine weaned off on 1/5, intermittent headaches w/isordil (tolerating 10mg TID)  - Discontinued amlodipine  - 1/16 nephrology requested permissive hypertension to help with perfusion    Normotensive ischemic injury with CRS   VENKAT on Proteinuric CKD IV, nonoliguric, Stage II Liely 2/2 Diabetic Kidney disease   Acute on Chronic Urinary retention s/p adhikari placement  C/f neurogenic bladder 2/2 DMII   - Cr last year 1.7-5.3, worsening since 2022. Baseline ~1.68  - Admit Cr 2.34  - Daily Cr 4.48H, 4.38, 4.03 (4.40, 4.28, 4.06x2, 3.89, 3.85, 3.61, 3.73)  - urine lytes, osmolality reviewed, FENa 5.6% suggesting intrinsic cause  - Nephrology following:   - Hold torsemide since appears euvolemic  - Cr Elevation tiology: Likely Multifactorial (including urinary retention and Normotensive ischemic injury)   - maintain SBP >120.  Allow permissive HTN to SBP 140s. Discontinue amlodipine  - Strict I&Os  - 1/15 renal US: Borderline increased renal cortical echogenicity bilaterally which may reflect medical renal disease, without evidence of obstructive, bladder decompressed by adhikari, no acute process   - 1/15 UA no infection, + protein, +glucose  - 1/16 urology consult:   -Suspected renal dysfunction secondary to intrinsic process.   - Low concern for postobstructive process.   - Will need outpatient urology referral.     Anemia of unclear origin  - unremarkable colonoscopy 2019, rec'd to repeat in 7yrs for screening  - Baseline Hgb ~9-11  - Hgb today: 9.3 L stable (9.4, 9.9, ..., 11.1 on admit)  - irons studies: sat 33%WNL, folate/ferritin/vit. B12 WNL> s/p IV iron 200mg x5 days, now on oral iron supplement  - no overt signs of bleeding    Paraesthesias, unspecified  - c/o paraesthesias around mouth & fingertips, new on admit  - capsaicin cream for hands  - cont. OP follow-up w/PCP (scheduled 2/20)  - Folate/ferritin/vit. B12 WNL  - 1/17: Na, Ca, K WNL  - 1/19: Na and K WNL. Ca minimally reduced. Continue to monitor RFP    Morbid Obesity  Insulin dependent Type 2 diabetes, Poorly controlled  Neuropathy  - Follows with clinical pharmacy closely/PCP  - Home regimen: Mounjaro 2.5mg weekly, basaglar 32 units every morning, admelog SS with meals  - Home gabapentin 300mg BID   - c/w lantus 18 units every morning (decreased from 32U iso AM BG of 62) and SSI #2 while inpatient    HLD  PAD  - c/w asa 81mg daily, atorva 40mg daily      C/f RENZO  - reported increased daytime fatigue  - desats on RA in-house while sleeping  - referral sent to sleep medicine      Constipation  - cont. Bowel regimen  - encourage frequent ambulation      DVT ppx: subcutaneous heparin    DISPO:   - Pending renal insufficiency improvement and volume stability.  - [facility] on eventual discharge    Code status: Full Code    Patient seen and discussed with Dr. Ermelinda MCCOLLUM  Elan.  Carmen Choudhury PA-C

## 2025-01-20 ENCOUNTER — APPOINTMENT (OUTPATIENT)
Dept: ENDOCRINOLOGY | Facility: CLINIC | Age: 60
End: 2025-01-20
Payer: COMMERCIAL

## 2025-01-20 LAB
ALBUMIN SERPL BCP-MCNC: 3.2 G/DL (ref 3.4–5)
ANION GAP SERPL CALC-SCNC: 14 MMOL/L (ref 10–20)
BUN SERPL-MCNC: 87 MG/DL (ref 6–23)
CALCIUM SERPL-MCNC: 8.7 MG/DL (ref 8.6–10.6)
CHLORIDE SERPL-SCNC: 105 MMOL/L (ref 98–107)
CO2 SERPL-SCNC: 26 MMOL/L (ref 21–32)
CREAT SERPL-MCNC: 4.16 MG/DL (ref 0.5–1.05)
EGFRCR SERPLBLD CKD-EPI 2021: 12 ML/MIN/1.73M*2
ERYTHROCYTE [DISTWIDTH] IN BLOOD BY AUTOMATED COUNT: 14.6 % (ref 11.5–14.5)
GLUCOSE BLD MANUAL STRIP-MCNC: 115 MG/DL (ref 74–99)
GLUCOSE BLD MANUAL STRIP-MCNC: 140 MG/DL (ref 74–99)
GLUCOSE BLD MANUAL STRIP-MCNC: 150 MG/DL (ref 74–99)
GLUCOSE BLD MANUAL STRIP-MCNC: 197 MG/DL (ref 74–99)
GLUCOSE SERPL-MCNC: 102 MG/DL (ref 74–99)
HCT VFR BLD AUTO: 29.7 % (ref 36–46)
HGB BLD-MCNC: 9.2 G/DL (ref 12–16)
MAGNESIUM SERPL-MCNC: 2.95 MG/DL (ref 1.6–2.4)
MCH RBC QN AUTO: 30.4 PG (ref 26–34)
MCHC RBC AUTO-ENTMCNC: 31 G/DL (ref 32–36)
MCV RBC AUTO: 98 FL (ref 80–100)
NRBC BLD-RTO: 0 /100 WBCS (ref 0–0)
PHOSPHATE SERPL-MCNC: 6.1 MG/DL (ref 2.5–4.9)
PLATELET # BLD AUTO: 370 X10*3/UL (ref 150–450)
POTASSIUM SERPL-SCNC: 4.4 MMOL/L (ref 3.5–5.3)
RBC # BLD AUTO: 3.03 X10*6/UL (ref 4–5.2)
SODIUM SERPL-SCNC: 141 MMOL/L (ref 136–145)
WBC # BLD AUTO: 8.3 X10*3/UL (ref 4.4–11.3)

## 2025-01-20 PROCEDURE — 2500000002 HC RX 250 W HCPCS SELF ADMINISTERED DRUGS (ALT 637 FOR MEDICARE OP, ALT 636 FOR OP/ED): Performed by: STUDENT IN AN ORGANIZED HEALTH CARE EDUCATION/TRAINING PROGRAM

## 2025-01-20 PROCEDURE — 36415 COLL VENOUS BLD VENIPUNCTURE: CPT | Performed by: NURSE PRACTITIONER

## 2025-01-20 PROCEDURE — 2500000001 HC RX 250 WO HCPCS SELF ADMINISTERED DRUGS (ALT 637 FOR MEDICARE OP): Performed by: NURSE PRACTITIONER

## 2025-01-20 PROCEDURE — 2500000001 HC RX 250 WO HCPCS SELF ADMINISTERED DRUGS (ALT 637 FOR MEDICARE OP): Performed by: STUDENT IN AN ORGANIZED HEALTH CARE EDUCATION/TRAINING PROGRAM

## 2025-01-20 PROCEDURE — 83735 ASSAY OF MAGNESIUM: CPT | Performed by: NURSE PRACTITIONER

## 2025-01-20 PROCEDURE — 1200000002 HC GENERAL ROOM WITH TELEMETRY DAILY

## 2025-01-20 PROCEDURE — 82947 ASSAY GLUCOSE BLOOD QUANT: CPT

## 2025-01-20 PROCEDURE — 85027 COMPLETE CBC AUTOMATED: CPT | Performed by: NURSE PRACTITIONER

## 2025-01-20 PROCEDURE — 2500000004 HC RX 250 GENERAL PHARMACY W/ HCPCS (ALT 636 FOR OP/ED): Performed by: NURSE PRACTITIONER

## 2025-01-20 PROCEDURE — 2500000002 HC RX 250 W HCPCS SELF ADMINISTERED DRUGS (ALT 637 FOR MEDICARE OP, ALT 636 FOR OP/ED): Performed by: NURSE PRACTITIONER

## 2025-01-20 PROCEDURE — 80069 RENAL FUNCTION PANEL: CPT | Performed by: NURSE PRACTITIONER

## 2025-01-20 PROCEDURE — 2500000001 HC RX 250 WO HCPCS SELF ADMINISTERED DRUGS (ALT 637 FOR MEDICARE OP): Performed by: PHYSICIAN ASSISTANT

## 2025-01-20 PROCEDURE — 99233 SBSQ HOSP IP/OBS HIGH 50: CPT | Performed by: INTERNAL MEDICINE

## 2025-01-20 RX ORDER — HYDRALAZINE HYDROCHLORIDE 25 MG/1
25 TABLET, FILM COATED ORAL 3 TIMES DAILY
Status: DISCONTINUED | OUTPATIENT
Start: 2025-01-20 | End: 2025-01-23 | Stop reason: HOSPADM

## 2025-01-20 RX ORDER — ISOSORBIDE MONONITRATE 30 MG/1
30 TABLET, EXTENDED RELEASE ORAL DAILY
Status: DISCONTINUED | OUTPATIENT
Start: 2025-01-21 | End: 2025-01-23 | Stop reason: HOSPADM

## 2025-01-20 RX ORDER — CARVEDILOL 6.25 MG/1
6.25 TABLET ORAL 2 TIMES DAILY
Status: DISCONTINUED | OUTPATIENT
Start: 2025-01-20 | End: 2025-01-22

## 2025-01-20 RX ADMIN — HEPARIN SODIUM 7500 UNITS: 5000 INJECTION INTRAVENOUS; SUBCUTANEOUS at 14:01

## 2025-01-20 RX ADMIN — OXYBUTYNIN CHLORIDE 5 MG: 5 TABLET ORAL at 20:51

## 2025-01-20 RX ADMIN — HEPARIN SODIUM 7500 UNITS: 5000 INJECTION INTRAVENOUS; SUBCUTANEOUS at 05:02

## 2025-01-20 RX ADMIN — GABAPENTIN 300 MG: 300 CAPSULE ORAL at 20:51

## 2025-01-20 RX ADMIN — ASPIRIN 81 MG: 81 TABLET, CHEWABLE ORAL at 08:46

## 2025-01-20 RX ADMIN — GABAPENTIN 300 MG: 300 CAPSULE ORAL at 08:46

## 2025-01-20 RX ADMIN — ISOSORBIDE DINITRATE 10 MG: 10 TABLET ORAL at 20:51

## 2025-01-20 RX ADMIN — NEPHROCAP 1 CAPSULE: 1 CAP ORAL at 08:46

## 2025-01-20 RX ADMIN — FERROUS SULFATE TAB 325 MG (65 MG ELEMENTAL FE) 325 MG: 325 (65 FE) TAB at 08:46

## 2025-01-20 RX ADMIN — ISOSORBIDE DINITRATE 10 MG: 10 TABLET ORAL at 08:46

## 2025-01-20 RX ADMIN — HYDRALAZINE HYDROCHLORIDE 50 MG: 50 TABLET ORAL at 08:46

## 2025-01-20 RX ADMIN — INSULIN GLARGINE 18 UNITS: 100 INJECTION, SOLUTION SUBCUTANEOUS at 21:05

## 2025-01-20 RX ADMIN — ATORVASTATIN CALCIUM 40 MG: 40 TABLET, FILM COATED ORAL at 20:51

## 2025-01-20 RX ADMIN — HYDRALAZINE HYDROCHLORIDE 25 MG: 25 TABLET ORAL at 20:51

## 2025-01-20 RX ADMIN — OXYBUTYNIN CHLORIDE 5 MG: 5 TABLET ORAL at 14:00

## 2025-01-20 RX ADMIN — ISOSORBIDE DINITRATE 10 MG: 10 TABLET ORAL at 14:00

## 2025-01-20 RX ADMIN — FOLIC ACID 1 MG: 1 TABLET ORAL at 08:46

## 2025-01-20 RX ADMIN — HEPARIN SODIUM 7500 UNITS: 5000 INJECTION INTRAVENOUS; SUBCUTANEOUS at 21:16

## 2025-01-20 RX ADMIN — CARVEDILOL 6.25 MG: 6.25 TABLET, FILM COATED ORAL at 20:51

## 2025-01-20 RX ADMIN — OXYBUTYNIN CHLORIDE 5 MG: 5 TABLET ORAL at 08:46

## 2025-01-20 RX ADMIN — HYDRALAZINE HYDROCHLORIDE 25 MG: 25 TABLET ORAL at 14:01

## 2025-01-20 NOTE — CARE PLAN
The patient's goals for the shift include sit up to chair for meals; more independence with ADLs    The clinical goals for the shift include patient will remain HDS    Patient remained HDS throughout the shift. Frequent rounding was done to make sure she was safe and needs were met.

## 2025-01-20 NOTE — PROGRESS NOTES
01/20/25        Transitional Care Coordination Progress Note:   Patient discussed during interdisciplinary rounds.   Team members present: RN TCC WINTER BE   Plan per Medical/Surgical team: Pending renal insufficiency improvement and volume stability.   Discharge disposition: Home   Status-Inpatient   Payer- [facility]    Potential Barriers: None   ADOD: 1-  Rohit Burnett RN [phone]

## 2025-01-20 NOTE — PROGRESS NOTES
Subjective:  No complaints.  at bedside.    Today in brief:  - Holding further diuresis with close monitoring of renal function  - Appreciate ongoing nephrology recommendations  - Permissive hypertension to help with perfusion (SBP >120)  - Reduce hydralazine from 50 to 25mg TID  - Start coreg 6.25mg BID tonight  - Switch isordil to imdur tomorrow AM    Objective:  Vitals:    01/20/25 0509   BP: 145/82   Pulse: 76   Resp: 18   Temp: 36.7 °C (98.1 °F)   SpO2: 93%     Weight         1/14/2025  0630 1/15/2025  0500 1/16/2025  0607 1/17/2025  0500 1/18/2025  0357    Weight: 100 kg (221 lb 1.9 oz) 102 kg (225 lb 1.4 oz) 102 kg (225 lb 12 oz) 102 kg (225 lb 12 oz) 104 kg (229 lb 0.9 oz)            Intake/Output Summary (Last 24 hours) at 1/20/2025 0732  Last data filed at 1/20/2025 0123  Gross per 24 hour   Intake 240 ml   Output 1750 ml   Net -1510 ml     Recent Results (from the past 24 hours)   POCT GLUCOSE    Collection Time: 01/19/25  8:13 AM   Result Value Ref Range    POCT Glucose 150 (H) 74 - 99 mg/dL   POCT GLUCOSE    Collection Time: 01/19/25  1:23 PM   Result Value Ref Range    POCT Glucose 139 (H) 74 - 99 mg/dL   POCT GLUCOSE    Collection Time: 01/19/25  4:59 PM   Result Value Ref Range    POCT Glucose 146 (H) 74 - 99 mg/dL   POCT GLUCOSE    Collection Time: 01/19/25  9:09 PM   Result Value Ref Range    POCT Glucose 187 (H) 74 - 99 mg/dL   CBC    Collection Time: 01/19/25  9:15 PM   Result Value Ref Range    WBC 9.3 4.4 - 11.3 x10*3/uL    nRBC 0.0 0.0 - 0.0 /100 WBCs    RBC 3.22 (L) 4.00 - 5.20 x10*6/uL    Hemoglobin 9.5 (L) 12.0 - 16.0 g/dL    Hematocrit 30.9 (L) 36.0 - 46.0 %    MCV 96 80 - 100 fL    MCH 29.5 26.0 - 34.0 pg    MCHC 30.7 (L) 32.0 - 36.0 g/dL    RDW 14.5 11.5 - 14.5 %    Platelets 397 150 - 450 x10*3/uL   Magnesium    Collection Time: 01/19/25  9:15 PM   Result Value Ref Range    Magnesium 2.81 (H) 1.60 - 2.40 mg/dL   Renal function panel    Collection Time: 01/19/25  9:15 PM   Result  Value Ref Range    Glucose 179 (H) 74 - 99 mg/dL    Sodium 139 136 - 145 mmol/L    Potassium 4.2 3.5 - 5.3 mmol/L    Chloride 101 98 - 107 mmol/L    Bicarbonate 26 21 - 32 mmol/L    Anion Gap 16 10 - 20 mmol/L    Urea Nitrogen 86 (H) 6 - 23 mg/dL    Creatinine 4.24 (H) 0.50 - 1.05 mg/dL    eGFR 11 (L) >60 mL/min/1.73m*2    Calcium 9.2 8.6 - 10.6 mg/dL    Phosphorus 6.5 (H) 2.5 - 4.9 mg/dL    Albumin 3.5 3.4 - 5.0 g/dL   POCT GLUCOSE    Collection Time: 01/20/25  6:38 AM   Result Value Ref Range    POCT Glucose 115 (H) 74 - 99 mg/dL     Inpatient Medications:  Scheduled medications   Medication Dose Route Frequency    aspirin  81 mg oral Daily    atorvastatin  40 mg oral Nightly    capsaicin   Topical TID    ferrous sulfate (325 mg ferrous sulfate)  65 mg of iron oral Daily with breakfast    folic acid  1 mg oral Daily    gabapentin  300 mg oral BID    heparin (porcine)  7,500 Units subcutaneous q8h NEHEMIAS    hydrALAZINE  50 mg oral TID    insulin glargine  18 Units subcutaneous q24h    insulin lispro  0-10 Units subcutaneous TID AC    isosorbide dinitrate  10 mg oral TID    oxybutynin  5 mg oral TID    polyethylene glycol  17 g oral Daily    sennosides-docusate sodium  2 tablet oral BID    [Held by provider] torsemide  60 mg oral BID    vitamin B complex-vitamin C-folic acid  1 capsule oral Daily     PRN medications   Medication    acetaminophen    albuterol    bisacodyl    dextrose    dextrose    glucagon    glucagon    oxygen    oxygen     Continuous Medications   Medication Dose Last Rate       Telemetry 1/20/2025 (personally reviewed): SR baseline 70s    Physical exam:  General: NAD  Head/ neck: atraumatic  Cardiac: RRR, regular S1 S2 , no murmur, no rub, no gallop  Pulm: CTA bilaterally with mildly diminished bases  Vascular: Radial 2+ bilaterally  GI: Non distended  Extremities: trace LE edema   Neuro: no focal neuro deficits   Psych: appropriate mood and behavior   Skin: warm and dry     Assessment/Plan   Nuris  ARIEL Squires is a 59 y.o. female presenting with PMH of poorly controlled insulin dependent type 2 diabetes c/b neuropathy, hyperlipidemia, PAD, CKD stage IV, hypertension, multiple foot infections s/p partial R great toe amputation, fall w/L femur fracture s/p ORIF (4/2024) that presents to the emergency department today for shortness of breath x1 day. Admitted under HHVI Service for further management of acute decompensated heart failure and HTN Urgency.     Acute decompensated diastolic heart failure  Chest tightness, resolved  - likely 2/2 uncontrolled HTN  - TTE (1/2024): EF 70-75%  - TTE 1/3/2025: EF 69%, grade II diastolic filling, mod MR, mod TR, small pericardial effusion  - Negative stress 10/2019  - CXR: pulm vascular congestion BL, small bibasilar pleural effusions  - Last BNP (1/5) 161 (376 on admit iso obesity)  - HS trop 21, no ischemic changes on ECG, no c/f ACS  - Dry weight ~100kg  - Admit weight: 107kg  - Daily weight: 104 (102, 102, 102, 100,... 106 kg)  - RHC (1/8): reviewed tracings with Dr. Mckee, numbers are questionable but RVEDP 20, PA 75/25, unclear wedge  - of note, pharmacy verified patient on PO lasix 20mg daily at home (?for unclear reasons, presumably prescribed by PCP for leg swelling)-> per patient, was just started on it at home  - s/p successful diuresis w/IV lasix gtt at 15mg/hr and PRN metolazone  - 1/14 held Torsemide per nephrology.   - s/p RHC 1/17: RA 8, PA 51/15, PCWP 14, CO/CI 18.2/9 ; much improved pressures compared to prior.  - Given improvement in volume status per RHC  1/18 and recurrently elevated Cr, holding further diuresis with close monitoring of renal function  - Appreciate ongoing nephrology recommendations  - GDMT  - Holding empa iso GFR <20  - avoid ACEI/ARB/ARNI, MRA for now d/t elevated Cr  - Reduce hydralazine from 50 to 25mg TID (hold if BP <120)  - Start coreg 6.25mg BID tonight  - Switch isordil to imdur tomorrow AM  - HF navigator following,  establishing care here  - Daily standing weights, strict I&O's, cardiac diet     Acute hypoxic respiratory failure iso of ADHF exacerbation, improving  Pulmonary HTN  - p.ox initially at 61% but improved rapidly with placement of nasal cannula  - She was requiring 6 L of oxygen, BiPAP briefly due to the concern for acute flash pulmonary edema, received SL nitro and she was able to wean off of BiPAP  -  weaned from 2L O2 to RA, however, desats at night, c/f RENZO (see below)  - VQ scan (1/9; for pulm HTN given high PA on RHC): low probability of PE  - Flu A/B/RSV/COVID negative  - pulmonary toileting w/incentive spirometry   - Saturating well on RA  with no noted increased WOB.     HTN urgency, resolved  - BP on on admit: 220/98  - SBP improved to 143 after resumption of home meds and s/p IVP Hydral 5mg x1  - Last 24 hour SBP range: 110-160s (mainly <120 but <140s)  - BPs labile, clonidine weaned off on 1/5, intermittent headaches w/isordil (tolerating 10mg TID)  - Discontinued amlodipine  - 1/16 nephrology requested permissive hypertension to help with perfusion (SBP >120)    Normotensive ischemic injury with CRS   VENKAT on Proteinuric CKD IV, nonoliguric, Stage II Liely 2/2 Diabetic Kidney disease   Acute on Chronic Urinary retention s/p adhikari placement  C/f neurogenic bladder 2/2 DMII   - Cr last year 1.7-5.3, worsening since 2022.   - Baseline ~1.68  - Admit Cr 2.34  - Daily Cr 4.24H (4.48, 4.38, 4.03 (4.40, 4.28, 4.06x2, 3.89, 3.85, 3.61, 3.73)  - urine lytes, osmolality reviewed, FENa 5.6% suggesting intrinsic cause  - Nephrology following:   - Hold torsemide since appears euvolemic  - Cr Elevation etiology: Likely Multifactorial (including urinary retention and Normotensive ischemic injury)   - maintain SBP >120. Allow permissive HTN to SBP 140s. Discontinue amlodipine  - Strict I&Os  - 1/15 renal US: Borderline increased renal cortical echogenicity bilaterally which may reflect medical renal disease, without  evidence of obstructive, bladder decompressed by adhikari, no acute process   - 1/15 UA no infection, + protein, +glucose  - 1/16 urology consult:   -Suspected renal dysfunction secondary to intrinsic process.   -Recommend maintaining adhikari catheter throughout admission and upon discharge with plan for TOV as outpt via urology follow up  - Oxybutynin 5mg TID for symptoms of bladder spasms and pressure   - Low concern for postobstructive process.     Anemia of unclear origin  - Unremarkable colonoscopy 2019, rec'd to repeat in 7yrs for screening  - Baseline Hgb ~9-11  - Hgb today: 9.5 L stable (9.3, 9.4, 9.9, ..., 11.1 on admit)  - irons studies: sat 33%WNL, folate/ferritin/vit. B12 WNL> s/p IV iron 200mg x5 days, now on oral iron supplement  - no overt signs of bleeding    Paraesthesias, unspecified  - c/o paraesthesias around mouth & fingertips, new on admit  - capsaicin cream for hands  - cont. OP follow-up w/PCP (scheduled 2/20)  - Folate/ferritin/vit. B12 WNL  - 1/20: Electrolytes WNL  (K, Ca, Na)    Morbid Obesity  Insulin dependent Type 2 diabetes, Poorly controlled  Neuropathy  - Follows with clinical pharmacy closely/PCP  - Home regimen: Mounjaro 2.5mg weekly, basaglar 32 units every morning, admelog SS with meals  - Home gabapentin 300mg BID   - c/w lantus 18 units every morning (decreased from 32U iso AM BG of 62) and SSI #2 while inpatient    HLD  PAD  - c/w asa 81mg daily, atorva 40mg daily      C/f RENZO  - reported increased daytime fatigue  - desats on RA in-house while sleeping  - referral sent to sleep medicine      Constipation  - cont. Bowel regimen  - encourage frequent ambulation      DVT ppx: subcutaneous heparin    DISPO:   - Pending renal insufficiency improvement and volume stability.  - [facility] on eventual discharge    Code status: Full Code    Patient seen and discussed with Dr. Ninfa Choudhury PA-C

## 2025-01-20 NOTE — CONSULTS
Heart Failure Nurse Navigator    The role of the HF nurse navigator is to (1) characterize risk profiles of patients with heart failure transitioning from [id number] after hospitalization, (2) recommend interventions to improve care and reduce risks of worse post-hospitalization outcomes. Potential recommendations may touch base on patient/family education, additional consults that may bring value, and appointment scheduling.    History  59 y.o. female presenting with PMH of poorly controlled insulin dependent type 2 diabetes c/b neuropathy, hyperlipidemia, PAD, CKD stage IV, hypertension, multiple foot infections s/p partial R great toe amputation, fall w/L femur fracture s/p ORIF (4/2024) that presented to the emergency department for shortness of breath x1 day. Admitted under HHVI Service for further management of acute ADHF and HTN Urgency.     I met with Nuris Squires at the bedside. We discussed diastolic heart failure education and preparation for transitioning from hospital to home. She lives independently and has no social concerns. She mentioned that she has been depressed for a couple of weeks.    Patients Cardiologist(s): None    Patients Primary Care Provider: Mone Aquino MD   Next appointment: 2/20/2025 10:30am    1. Medical Domain  What is the patient's most recent LVEF? 69%  HFrEF (LVEF <= 40%) Quadruple therapy recommended  HFmrEF (LVEF 41-49%) Quadruple therapy recommended  HFpEF (LVEF >= 50%) Minimum recommendations: SGLT2i and MRA  Is the patient on OP GDMT for their condition?   ARNI/ACEI/ARB: N/A   BB: N/A   MRA: N/A   SGLT2i: N/A   Is the patient prescribed a diuretic? No    Does this patient have an implanted device (ie cardioMEMS, ICD, CRT-D)?  Device type: NA  Could this patient have advanced heart failure (Stage D heart failure)?: No   If yes, the potential markers of advanced heart failure include:     REFERENCE: Potential markers of advanced HF   Inotrope (dobutamine or  "milrinone) used during this admission?   LVEF<=25%?   >=2 hospitalizations for ADHF in the last year?   Severe symptoms of HF (fatigue, dyspnea, confusion, edema) despite medical therapy?   Downtitration of GDMT as compared to home medications?   Discontinuation of GDMT because of hypotension or renal intolerance?   Recurrent arrhythmias (AF, VT with ICD shocks)?   Cardiac cachexia (i.e., unintentional weight loss due to HF)?   High-risk biomarker profile (e.g., hyponatremia [Na<135], very elevated BNP, worsening kidney function)   Escalating doses of diuretics or persistent edema despite escalation     2. Mind and Emotion  Does this patient have possible cognitive impairment?: No (The Mini-Cog score 5/5)  Ask the patient to memorize these 3 words: banana, sunrise, chair  Ask the patient to draw a clock with hands pointing at \"20 minutes after 8\"  Ask the patient to recall the 3 words  Score: Add number of words recalled + clock drawing (0 points for any errors, 2 points if correct)  Interpretation: A score of 0-2 suggests cognitive impairment is present, a score of 3-5 suggests cognitive impairment is absent  Does this patient have major depression?: Yes (PH-Q2 score 3/6)  Over the last 2 weeks: Little interest or pleasure in doing things? (Not at all +0, Several days +1, More than half the days +2, Nearly every day +3)  Over the last 2 weeks: Feeling down, depressed or hopeless? (Not at all +0, Several days +1, More than half the days +2, Nearly every day +3)  Score: Add points  Interpretation: A score of 3 or more suggests that major depression is likely.     3. Physical Function  Could this patient be frail?: No   Defined by presence of all of these: slowness, weakness, shrinking, inactivity, exhaustion  Is this patient at risk for falling?: Yes   Defined by having experienced a fall in the last 12 months.    4. Social Determinants of Health  Transportation deficits?: No   Lack of insurance?: No   Poor financial " "resources?: No   Living conditions (homelessness, unstable home)?: No   Poor family/social support?: No   Poor personal care?: No   Food insecurity?: No   Lack of HCPOA?: No    I provided the following heart failure education:  - Living With Heart Failure book provided.  - HF signs and symptoms, heart failure zones and when to call cardiologist.   - Controlling Heart Failure at Home: medication adherence, following up with cardiologist at least once yearly, staying healthy and active, limiting sodium and fluid intake as directed by cardiologist.  - Daily Weight Education    Assessment  CardioMEMS education provided.    Update: 1/20/25: Unknown DOD. Awaiting improvement in kidney function.   HF cardiology follow-up with Dr. Sparks rescheduled for 2/4/2025 at  [facility].    IP Medications:  ARNi/ACEi/ARB: None  BB: None  MRA: None  SGLT2i: empagliflozin 10 mg daily  Diuretic: Furosemide 40 mg daily    Recommendations/ Plan  Recommend evaluation by , geriatrician, or psychiatrist (Patient may have depression).  Recommend healthy at home.      *Discharge Appointment Follow-up Plan:  Cardiology: Marylin Sparks MD 1/17/25 10am [facility]- Cancelled.   Rescheduled: Marylin Sparks MD 2/4/25 3pm [facility]    Key \"Ray\" Otto VICTORIA, RN  Heart Failure Clinical Nurse Navigator  [phone]     "

## 2025-01-21 LAB
ALBUMIN SERPL BCP-MCNC: 3.4 G/DL (ref 3.4–5)
ANION GAP SERPL CALC-SCNC: 16 MMOL/L (ref 10–20)
BUN SERPL-MCNC: 81 MG/DL (ref 6–23)
CALCIUM SERPL-MCNC: 8.8 MG/DL (ref 8.6–10.6)
CHLORIDE SERPL-SCNC: 104 MMOL/L (ref 98–107)
CO2 SERPL-SCNC: 21 MMOL/L (ref 21–32)
CREAT SERPL-MCNC: 3.87 MG/DL (ref 0.5–1.05)
EGFRCR SERPLBLD CKD-EPI 2021: 13 ML/MIN/1.73M*2
ERYTHROCYTE [DISTWIDTH] IN BLOOD BY AUTOMATED COUNT: 14.3 % (ref 11.5–14.5)
GLUCOSE BLD MANUAL STRIP-MCNC: 189 MG/DL (ref 74–99)
GLUCOSE BLD MANUAL STRIP-MCNC: 207 MG/DL (ref 74–99)
GLUCOSE BLD MANUAL STRIP-MCNC: 216 MG/DL (ref 74–99)
GLUCOSE BLD MANUAL STRIP-MCNC: 301 MG/DL (ref 74–99)
GLUCOSE SERPL-MCNC: 182 MG/DL (ref 74–99)
HCT VFR BLD AUTO: 35 % (ref 36–46)
HGB BLD-MCNC: 9.9 G/DL (ref 12–16)
MAGNESIUM SERPL-MCNC: 3.02 MG/DL (ref 1.6–2.4)
MCH RBC QN AUTO: 30.2 PG (ref 26–34)
MCHC RBC AUTO-ENTMCNC: 28.3 G/DL (ref 32–36)
MCV RBC AUTO: 107 FL (ref 80–100)
NRBC BLD-RTO: 0 /100 WBCS (ref 0–0)
PHOSPHATE SERPL-MCNC: 4.9 MG/DL (ref 2.5–4.9)
PLATELET # BLD AUTO: 342 X10*3/UL (ref 150–450)
POTASSIUM SERPL-SCNC: 4.9 MMOL/L (ref 3.5–5.3)
RBC # BLD AUTO: 3.28 X10*6/UL (ref 4–5.2)
SODIUM SERPL-SCNC: 136 MMOL/L (ref 136–145)
WBC # BLD AUTO: 7.8 X10*3/UL (ref 4.4–11.3)

## 2025-01-21 PROCEDURE — 1200000002 HC GENERAL ROOM WITH TELEMETRY DAILY

## 2025-01-21 PROCEDURE — 85027 COMPLETE CBC AUTOMATED: CPT | Performed by: PHYSICIAN ASSISTANT

## 2025-01-21 PROCEDURE — 2500000002 HC RX 250 W HCPCS SELF ADMINISTERED DRUGS (ALT 637 FOR MEDICARE OP, ALT 636 FOR OP/ED): Performed by: NURSE PRACTITIONER

## 2025-01-21 PROCEDURE — 5A09357 ASSISTANCE WITH RESPIRATORY VENTILATION, LESS THAN 24 CONSECUTIVE HOURS, CONTINUOUS POSITIVE AIRWAY PRESSURE: ICD-10-PCS | Performed by: STUDENT IN AN ORGANIZED HEALTH CARE EDUCATION/TRAINING PROGRAM

## 2025-01-21 PROCEDURE — 82947 ASSAY GLUCOSE BLOOD QUANT: CPT

## 2025-01-21 PROCEDURE — 83735 ASSAY OF MAGNESIUM: CPT | Performed by: PHYSICIAN ASSISTANT

## 2025-01-21 PROCEDURE — 2500000002 HC RX 250 W HCPCS SELF ADMINISTERED DRUGS (ALT 637 FOR MEDICARE OP, ALT 636 FOR OP/ED): Performed by: STUDENT IN AN ORGANIZED HEALTH CARE EDUCATION/TRAINING PROGRAM

## 2025-01-21 PROCEDURE — 2500000001 HC RX 250 WO HCPCS SELF ADMINISTERED DRUGS (ALT 637 FOR MEDICARE OP): Performed by: NURSE PRACTITIONER

## 2025-01-21 PROCEDURE — 99233 SBSQ HOSP IP/OBS HIGH 50: CPT | Performed by: INTERNAL MEDICINE

## 2025-01-21 PROCEDURE — 2500000004 HC RX 250 GENERAL PHARMACY W/ HCPCS (ALT 636 FOR OP/ED): Performed by: NURSE PRACTITIONER

## 2025-01-21 PROCEDURE — 36415 COLL VENOUS BLD VENIPUNCTURE: CPT | Performed by: PHYSICIAN ASSISTANT

## 2025-01-21 PROCEDURE — 2500000001 HC RX 250 WO HCPCS SELF ADMINISTERED DRUGS (ALT 637 FOR MEDICARE OP): Performed by: STUDENT IN AN ORGANIZED HEALTH CARE EDUCATION/TRAINING PROGRAM

## 2025-01-21 PROCEDURE — 2500000001 HC RX 250 WO HCPCS SELF ADMINISTERED DRUGS (ALT 637 FOR MEDICARE OP): Performed by: PHYSICIAN ASSISTANT

## 2025-01-21 PROCEDURE — 80069 RENAL FUNCTION PANEL: CPT | Performed by: NURSE PRACTITIONER

## 2025-01-21 RX ADMIN — INSULIN GLARGINE 18 UNITS: 100 INJECTION, SOLUTION SUBCUTANEOUS at 21:11

## 2025-01-21 RX ADMIN — ASPIRIN 81 MG: 81 TABLET, CHEWABLE ORAL at 09:35

## 2025-01-21 RX ADMIN — INSULIN LISPRO 8 UNITS: 100 INJECTION, SOLUTION INTRAVENOUS; SUBCUTANEOUS at 09:35

## 2025-01-21 RX ADMIN — ATORVASTATIN CALCIUM 40 MG: 40 TABLET, FILM COATED ORAL at 21:10

## 2025-01-21 RX ADMIN — OXYBUTYNIN CHLORIDE 5 MG: 5 TABLET ORAL at 14:01

## 2025-01-21 RX ADMIN — SENNOSIDES AND DOCUSATE SODIUM 2 TABLET: 50; 8.6 TABLET ORAL at 21:10

## 2025-01-21 RX ADMIN — GABAPENTIN 300 MG: 300 CAPSULE ORAL at 21:10

## 2025-01-21 RX ADMIN — GABAPENTIN 300 MG: 300 CAPSULE ORAL at 09:35

## 2025-01-21 RX ADMIN — CARVEDILOL 6.25 MG: 6.25 TABLET, FILM COATED ORAL at 21:10

## 2025-01-21 RX ADMIN — NEPHROCAP 1 CAPSULE: 1 CAP ORAL at 09:35

## 2025-01-21 RX ADMIN — HYDRALAZINE HYDROCHLORIDE 25 MG: 25 TABLET ORAL at 14:01

## 2025-01-21 RX ADMIN — OXYBUTYNIN CHLORIDE 5 MG: 5 TABLET ORAL at 21:10

## 2025-01-21 RX ADMIN — HEPARIN SODIUM 7500 UNITS: 5000 INJECTION INTRAVENOUS; SUBCUTANEOUS at 06:56

## 2025-01-21 RX ADMIN — INSULIN LISPRO 2 UNITS: 100 INJECTION, SOLUTION INTRAVENOUS; SUBCUTANEOUS at 17:53

## 2025-01-21 RX ADMIN — HYDRALAZINE HYDROCHLORIDE 25 MG: 25 TABLET ORAL at 21:10

## 2025-01-21 RX ADMIN — FOLIC ACID 1 MG: 1 TABLET ORAL at 09:35

## 2025-01-21 RX ADMIN — HEPARIN SODIUM 7500 UNITS: 5000 INJECTION INTRAVENOUS; SUBCUTANEOUS at 21:10

## 2025-01-21 RX ADMIN — OXYBUTYNIN CHLORIDE 5 MG: 5 TABLET ORAL at 09:35

## 2025-01-21 RX ADMIN — ISOSORBIDE MONONITRATE 30 MG: 30 TABLET, EXTENDED RELEASE ORAL at 09:35

## 2025-01-21 RX ADMIN — HEPARIN SODIUM 7500 UNITS: 5000 INJECTION INTRAVENOUS; SUBCUTANEOUS at 14:01

## 2025-01-21 RX ADMIN — FERROUS SULFATE TAB 325 MG (65 MG ELEMENTAL FE) 325 MG: 325 (65 FE) TAB at 09:35

## 2025-01-21 RX ADMIN — CARVEDILOL 6.25 MG: 6.25 TABLET, FILM COATED ORAL at 09:35

## 2025-01-21 RX ADMIN — HYDRALAZINE HYDROCHLORIDE 25 MG: 25 TABLET ORAL at 09:35

## 2025-01-21 RX ADMIN — INSULIN LISPRO 4 UNITS: 100 INJECTION, SOLUTION INTRAVENOUS; SUBCUTANEOUS at 14:02

## 2025-01-21 NOTE — SIGNIFICANT EVENT
Nuris Squires   59 y.o.      MRN/Room: [id number]    Subjective: No Acute Events overnight. Patient seen at bedside in no acute distress     Objective: Creatine down trending for 3rd lab in a row     Lab Results   Component Value Date    GLUCOSE 102 (H) 01/20/2025    CALCIUM 8.7 01/20/2025     01/20/2025    K 4.4 01/20/2025    CO2 26 01/20/2025     01/20/2025    BUN 87 (H) 01/20/2025    CREATININE 4.16 (H) 01/20/2025     Recommendations:     - Maintain SBP in the 120's   - Will Require close outpatient nephrology follow up (Austin Early MD)   - Would recommend outpatient endocrine f/u as well 2/2 her diabetes and diabetic nephropathy   - Avoid nephrotoxins, contrast if possible  - strict Is/Os to evaluate patient's fluid status   - Renal dosing for medications for latest eGFR, follow medication trough as appropriate    Nephro to Sign off      Michael Farmer MD

## 2025-01-21 NOTE — PROGRESS NOTES
Subjective:  No complaints    Today in brief:  - Encourage physical movement. PT/OT ordered  - Nephrology has signed off  - Monitor renal function and BP with medications adjustments overnight with possible DC tomorrow.      Objective:  Vitals:    01/21/25 0516   BP: 145/77   Pulse: 67   Resp: 16   Temp: 36.4 °C (97.5 °F)   SpO2: 92%     Weight         1/15/2025  0500 1/16/2025  0607 1/17/2025  0500 1/18/2025  0357 1/21/2025  0516    Weight: 102 kg (225 lb 1.4 oz) 102 kg (225 lb 12 oz) 102 kg (225 lb 12 oz) 104 kg (229 lb 0.9 oz) 103 kg (227 lb 15.3 oz)            Intake/Output Summary (Last 24 hours) at 1/21/2025 0725  Last data filed at 1/21/2025 0516  Gross per 24 hour   Intake 240 ml   Output 1750 ml   Net -1510 ml     Recent Results (from the past 24 hours)   POCT GLUCOSE    Collection Time: 01/20/25 12:54 PM   Result Value Ref Range    POCT Glucose 140 (H) 74 - 99 mg/dL   POCT GLUCOSE    Collection Time: 01/20/25  3:38 PM   Result Value Ref Range    POCT Glucose 150 (H) 74 - 99 mg/dL   CBC    Collection Time: 01/20/25  6:46 PM   Result Value Ref Range    WBC 8.3 4.4 - 11.3 x10*3/uL    nRBC 0.0 0.0 - 0.0 /100 WBCs    RBC 3.03 (L) 4.00 - 5.20 x10*6/uL    Hemoglobin 9.2 (L) 12.0 - 16.0 g/dL    Hematocrit 29.7 (L) 36.0 - 46.0 %    MCV 98 80 - 100 fL    MCH 30.4 26.0 - 34.0 pg    MCHC 31.0 (L) 32.0 - 36.0 g/dL    RDW 14.6 (H) 11.5 - 14.5 %    Platelets 370 150 - 450 x10*3/uL   Magnesium    Collection Time: 01/20/25  6:46 PM   Result Value Ref Range    Magnesium 2.95 (H) 1.60 - 2.40 mg/dL   Renal function panel    Collection Time: 01/20/25  6:46 PM   Result Value Ref Range    Glucose 102 (H) 74 - 99 mg/dL    Sodium 141 136 - 145 mmol/L    Potassium 4.4 3.5 - 5.3 mmol/L    Chloride 105 98 - 107 mmol/L    Bicarbonate 26 21 - 32 mmol/L    Anion Gap 14 10 - 20 mmol/L    Urea Nitrogen 87 (H) 6 - 23 mg/dL    Creatinine 4.16 (H) 0.50 - 1.05 mg/dL    eGFR 12 (L) >60 mL/min/1.73m*2    Calcium 8.7 8.6 - 10.6 mg/dL     Phosphorus 6.1 (H) 2.5 - 4.9 mg/dL    Albumin 3.2 (L) 3.4 - 5.0 g/dL   POCT GLUCOSE    Collection Time: 01/20/25  9:07 PM   Result Value Ref Range    POCT Glucose 197 (H) 74 - 99 mg/dL   POCT GLUCOSE    Collection Time: 01/21/25  6:30 AM   Result Value Ref Range    POCT Glucose 301 (H) 74 - 99 mg/dL     Inpatient Medications:  Scheduled medications   Medication Dose Route Frequency    aspirin  81 mg oral Daily    atorvastatin  40 mg oral Nightly    capsaicin   Topical TID    carvedilol  6.25 mg oral BID    ferrous sulfate (325 mg ferrous sulfate)  65 mg of iron oral Daily with breakfast    folic acid  1 mg oral Daily    gabapentin  300 mg oral BID    heparin (porcine)  7,500 Units subcutaneous q8h NEHEMIAS    hydrALAZINE  25 mg oral TID    insulin glargine  18 Units subcutaneous q24h    insulin lispro  0-10 Units subcutaneous TID AC    isosorbide mononitrate ER  30 mg oral Daily    oxybutynin  5 mg oral TID    polyethylene glycol  17 g oral Daily    sennosides-docusate sodium  2 tablet oral BID    [Held by provider] torsemide  60 mg oral BID    vitamin B complex-vitamin C-folic acid  1 capsule oral Daily     PRN medications   Medication    acetaminophen    albuterol    bisacodyl    dextrose    dextrose    glucagon    glucagon    oxygen    oxygen     Continuous Medications   Medication Dose Last Rate       Telemetry 1/21/2025 (personally reviewed): SR 60-70s    Physical exam:  General: NAD, obese lying in bed  Head/ neck: atraumatic  Cardiac: RRR, regular S1 S2 , no murmur, no rub, no gallop  Pulm: CTA bilaterally with mildly diminished bases  Vascular: Radial 2+ bilaterally  GI: Non distended  Extremities: trace LE edema   Neuro: no focal neuro deficits   Psych: appropriate mood and behavior   Skin: warm and dry     Assessment/Plan   Nuris Squires is a 59 y.o. female presenting with PMH of poorly controlled insulin dependent type 2 diabetes c/b neuropathy, hyperlipidemia, PAD, CKD stage IV, hypertension, multiple foot  infections s/p partial R great toe amputation, fall w/L femur fracture s/p ORIF (4/2024) that presents to the emergency department today for shortness of breath x1 day. Admitted under [facility]VI Service for further management of acute decompensated heart failure and HTN Urgency.     Acute decompensated diastolic heart failure  Chest tightness, resolved  - likely 2/2 uncontrolled HTN  - TTE (1/2024): EF 70-75%  - TTE 1/3/2025: EF 69%, grade II diastolic filling, mod MR, mod TR, small pericardial effusion  - Negative stress 10/2019  - CXR: pulm vascular congestion BL, small bibasilar pleural effusions  - Last BNP (1/5) 161 (376 on admit iso obesity)  - HS trop 21, no ischemic changes on ECG, no c/f ACS  - Dry weight ~100kg  - Admit weight: 107kg  - Daily weight: 103 (104, 102, 102, 102, 100,... 106 kg)  - RHC (1/8): reviewed tracings with Dr. Mckee, numbers are questionable but RVEDP 20, PA 75/25, unclear wedge  - of note, pharmacy verified patient on PO lasix 20mg daily at home (?for unclear reasons, presumably prescribed by PCP for leg swelling)-> per patient, was just started on it at home  - s/p successful diuresis w/IV lasix gtt at 15mg/hr and PRN metolazone  - 1/14 held Torsemide per nephrology.   - s/p RHC 1/17: RA 8, PA 51/15, PCWP 14, CO/CI 18.2/9 ; much improved pressures compared to prior.  - Given improvement in volume status per RHC  1/18 and recurrently elevated Cr, holding further diuresis with close monitoring of renal function  - nephrology signed off. Close outpt follow up to be arranged.  - GDMT  - Holding empa iso GFR <20  - avoid ACEI/ARB/ARNI, MRA for now d/t elevated Cr  - Continue hydralazine 25mg TID (hold if BP <120)  - Continue coreg 6.25mg BID tonight  - Continue imdur 30 daily  - Monitor BP/HR overnight with adjustments made 1/20  - HF navigator following, establishing care here  - Daily standing weights, strict I&O's, cardiac diet     Acute hypoxic respiratory failure iso of ADHF  exacerbation, improving  Pulmonary HTN  - p.ox initially at 61% but improved rapidly with placement of nasal cannula  - She was requiring 6 L of oxygen, BiPAP briefly due to the concern for acute flash pulmonary edema, received SL nitro and she was able to wean off of BiPAP  -  weaned from 2L O2 to RA, however, desats at night, c/f RENZO (see below)  - VQ scan (1/9; for pulm HTN given high PA on RHC): low probability of PE  - Flu A/B/RSV/COVID negative  - pulmonary toileting w/incentive spirometry   - Saturating well on RA  with no noted increased WOB.     HTN urgency, resolved  - BP on on admit: 220/98  - SBP improved to 143 after resumption of home meds and s/p IVP Hydral 5mg x1  - Last 24 hour SBP range: 110-160s (mainly <120 but <140s)  - BPs labile, clonidine weaned off on 1/5, intermittent headaches w/isordil (tolerating 10mg TID)  - Discontinued amlodipine  - 1/16 nephrology requested permissive hypertension to help with perfusion (SBP >120)  - Monitor BP overnight after coreg added 1/20     Normotensive ischemic injury with CRS   VENKAT on Proteinuric CKD IV, nonoliguric, Stage II Liely 2/2 Diabetic Kidney disease   Acute on Chronic Urinary retention s/p adhikari placement  C/f neurogenic bladder 2/2 DMII   - Cr last year 1.7-5.3, worsening since 2022.   - Baseline ~1.68  - Admit Cr 2.34  - Daily Cr 4.16, (4.24, 4.48, 4.38, 4.03 (4.40, 4.28, 4.06x2, 3.89, 3.85, 3.61, 3.73)  - urine lytes, osmolality reviewed, FENa 5.6% suggesting intrinsic cause  - Nephrology:   - Cr Elevation etiology: Likely Multifactorial (including urinary retention and Normotensive ischemic injury)   - maintain SBP >120. Allow permissive HTN to SBP 140s. Discontinued amlodipine  - Strict I&Os  - Close outpatient follow up  - 1/15 renal US: Borderline increased renal cortical echogenicity bilaterally which may reflect medical renal disease, without evidence of obstructive, bladder decompressed by adhikari, no acute process   - 1/15 UA no infection,  + protein, +glucose  - 1/16 urology consult:   -Suspected renal dysfunction secondary to intrinsic process.   -Recommend maintaining adhikari catheter throughout admission and upon discharge with plan for TOV as outpt via urology follow up  - Oxybutynin 5mg TID for symptoms of bladder spasms and pressure   - Low concern for postobstructive process.     Anemia of unclear origin  - Unremarkable colonoscopy 2019, rec'd to repeat in 7yrs for screening  - Baseline Hgb ~9-11  - Hgb today: 9.2 L stable (9.5, 9.3, 9.4, 9.9, ..., 11.1 on admit)  - irons studies: sat 33%WNL, folate/ferritin/vit. B12 WNL> s/p IV iron 200mg x5 days, now on oral iron supplement  - no overt signs of bleeding    Paraesthesias, unspecified  - c/o paraesthesias around mouth & fingertips, new on admit  - capsaicin cream for hands  - cont. OP follow-up w/PCP (scheduled 2/20)  - Folate/ferritin/vit. B12 WNL  - 1/21: Electrolytes WNL  (K, Ca, Na)    Morbid Obesity  Insulin dependent Type 2 diabetes, Poorly controlled  Neuropathy  - Follows with clinical pharmacy closely/PCP  - Home regimen: Mounjaro 2.5mg weekly, basaglar 32 units every morning, admelog SS with meals  - Home gabapentin 300mg BID   - c/w lantus 18 units every morning (decreased from 32U iso AM BG of 62) and SSI #2 while inpatient    HLD  PAD  - c/w asa 81mg daily, atorva 40mg daily      C/f RENZO  - reported increased daytime fatigue  - desats on RA in-house while sleeping  - referral sent to sleep medicine      Constipation  - cont. Bowel regimen  - encourage frequent ambulation      DVT ppx: subcutaneous heparin    DISPO:   - Possible discharge tomorrow pending BP and HR control.  - [facility] on eventual discharge    Code status: Full Code    Patient seen and discussed with Dr. Ninfa Choudhury PA-C

## 2025-01-21 NOTE — PROGRESS NOTES
Patients Lace Score greater than 78. Patient stated she needed no help making follow up pcp appointment.

## 2025-01-22 ENCOUNTER — APPOINTMENT (OUTPATIENT)
Dept: NEPHROLOGY | Facility: CLINIC | Age: 60
End: 2025-01-22
Payer: COMMERCIAL

## 2025-01-22 LAB
ALBUMIN SERPL BCP-MCNC: 3.1 G/DL (ref 3.4–5)
ANION GAP SERPL CALC-SCNC: 14 MMOL/L (ref 10–20)
BUN SERPL-MCNC: 81 MG/DL (ref 6–23)
CALCIUM SERPL-MCNC: 8.8 MG/DL (ref 8.6–10.6)
CHLORIDE SERPL-SCNC: 104 MMOL/L (ref 98–107)
CO2 SERPL-SCNC: 24 MMOL/L (ref 21–32)
CREAT SERPL-MCNC: 4.11 MG/DL (ref 0.5–1.05)
EGFRCR SERPLBLD CKD-EPI 2021: 12 ML/MIN/1.73M*2
ERYTHROCYTE [DISTWIDTH] IN BLOOD BY AUTOMATED COUNT: 14.3 % (ref 11.5–14.5)
GLUCOSE BLD MANUAL STRIP-MCNC: 100 MG/DL (ref 74–99)
GLUCOSE BLD MANUAL STRIP-MCNC: 100 MG/DL (ref 74–99)
GLUCOSE BLD MANUAL STRIP-MCNC: 150 MG/DL (ref 74–99)
GLUCOSE BLD MANUAL STRIP-MCNC: 154 MG/DL (ref 74–99)
GLUCOSE SERPL-MCNC: 157 MG/DL (ref 74–99)
HCT VFR BLD AUTO: 30 % (ref 36–46)
HGB BLD-MCNC: 9.4 G/DL (ref 12–16)
MAGNESIUM SERPL-MCNC: 2.9 MG/DL (ref 1.6–2.4)
MCH RBC QN AUTO: 30.4 PG (ref 26–34)
MCHC RBC AUTO-ENTMCNC: 31.3 G/DL (ref 32–36)
MCV RBC AUTO: 97 FL (ref 80–100)
NRBC BLD-RTO: 0 /100 WBCS (ref 0–0)
PHOSPHATE SERPL-MCNC: 5.2 MG/DL (ref 2.5–4.9)
PLATELET # BLD AUTO: 351 X10*3/UL (ref 150–450)
POTASSIUM SERPL-SCNC: 4.3 MMOL/L (ref 3.5–5.3)
RBC # BLD AUTO: 3.09 X10*6/UL (ref 4–5.2)
SODIUM SERPL-SCNC: 138 MMOL/L (ref 136–145)
WBC # BLD AUTO: 6 X10*3/UL (ref 4.4–11.3)

## 2025-01-22 PROCEDURE — 2500000002 HC RX 250 W HCPCS SELF ADMINISTERED DRUGS (ALT 637 FOR MEDICARE OP, ALT 636 FOR OP/ED): Performed by: NURSE PRACTITIONER

## 2025-01-22 PROCEDURE — 2500000004 HC RX 250 GENERAL PHARMACY W/ HCPCS (ALT 636 FOR OP/ED): Performed by: NURSE PRACTITIONER

## 2025-01-22 PROCEDURE — 97162 PT EVAL MOD COMPLEX 30 MIN: CPT | Mod: GP

## 2025-01-22 PROCEDURE — 2500000001 HC RX 250 WO HCPCS SELF ADMINISTERED DRUGS (ALT 637 FOR MEDICARE OP): Performed by: NURSE PRACTITIONER

## 2025-01-22 PROCEDURE — 2500000002 HC RX 250 W HCPCS SELF ADMINISTERED DRUGS (ALT 637 FOR MEDICARE OP, ALT 636 FOR OP/ED): Performed by: STUDENT IN AN ORGANIZED HEALTH CARE EDUCATION/TRAINING PROGRAM

## 2025-01-22 PROCEDURE — 36415 COLL VENOUS BLD VENIPUNCTURE: CPT | Performed by: PHYSICIAN ASSISTANT

## 2025-01-22 PROCEDURE — 80069 RENAL FUNCTION PANEL: CPT | Performed by: NURSE PRACTITIONER

## 2025-01-22 PROCEDURE — 2500000001 HC RX 250 WO HCPCS SELF ADMINISTERED DRUGS (ALT 637 FOR MEDICARE OP): Performed by: PHYSICIAN ASSISTANT

## 2025-01-22 PROCEDURE — 2500000001 HC RX 250 WO HCPCS SELF ADMINISTERED DRUGS (ALT 637 FOR MEDICARE OP): Performed by: STUDENT IN AN ORGANIZED HEALTH CARE EDUCATION/TRAINING PROGRAM

## 2025-01-22 PROCEDURE — 97165 OT EVAL LOW COMPLEX 30 MIN: CPT | Mod: GO

## 2025-01-22 PROCEDURE — 83735 ASSAY OF MAGNESIUM: CPT | Performed by: PHYSICIAN ASSISTANT

## 2025-01-22 PROCEDURE — 1200000002 HC GENERAL ROOM WITH TELEMETRY DAILY

## 2025-01-22 PROCEDURE — 82947 ASSAY GLUCOSE BLOOD QUANT: CPT

## 2025-01-22 PROCEDURE — 85027 COMPLETE CBC AUTOMATED: CPT | Performed by: PHYSICIAN ASSISTANT

## 2025-01-22 PROCEDURE — 99233 SBSQ HOSP IP/OBS HIGH 50: CPT | Performed by: INTERNAL MEDICINE

## 2025-01-22 RX ORDER — CARVEDILOL 12.5 MG/1
12.5 TABLET ORAL 2 TIMES DAILY
Status: DISCONTINUED | OUTPATIENT
Start: 2025-01-22 | End: 2025-01-23 | Stop reason: HOSPADM

## 2025-01-22 RX ADMIN — HYDRALAZINE HYDROCHLORIDE 25 MG: 25 TABLET ORAL at 21:22

## 2025-01-22 RX ADMIN — OXYBUTYNIN CHLORIDE 5 MG: 5 TABLET ORAL at 21:22

## 2025-01-22 RX ADMIN — FOLIC ACID 1 MG: 1 TABLET ORAL at 09:07

## 2025-01-22 RX ADMIN — HYDRALAZINE HYDROCHLORIDE 25 MG: 25 TABLET ORAL at 15:01

## 2025-01-22 RX ADMIN — HEPARIN SODIUM 7500 UNITS: 5000 INJECTION INTRAVENOUS; SUBCUTANEOUS at 15:02

## 2025-01-22 RX ADMIN — ISOSORBIDE MONONITRATE 30 MG: 30 TABLET, EXTENDED RELEASE ORAL at 09:07

## 2025-01-22 RX ADMIN — GABAPENTIN 300 MG: 300 CAPSULE ORAL at 09:07

## 2025-01-22 RX ADMIN — HEPARIN SODIUM 7500 UNITS: 5000 INJECTION INTRAVENOUS; SUBCUTANEOUS at 21:22

## 2025-01-22 RX ADMIN — CARVEDILOL 12.5 MG: 12.5 TABLET, FILM COATED ORAL at 09:07

## 2025-01-22 RX ADMIN — SENNOSIDES AND DOCUSATE SODIUM 2 TABLET: 50; 8.6 TABLET ORAL at 21:31

## 2025-01-22 RX ADMIN — GABAPENTIN 300 MG: 300 CAPSULE ORAL at 21:22

## 2025-01-22 RX ADMIN — ASPIRIN 81 MG: 81 TABLET, CHEWABLE ORAL at 09:07

## 2025-01-22 RX ADMIN — CARVEDILOL 12.5 MG: 12.5 TABLET, FILM COATED ORAL at 21:22

## 2025-01-22 RX ADMIN — OXYBUTYNIN CHLORIDE 5 MG: 5 TABLET ORAL at 15:01

## 2025-01-22 RX ADMIN — FERROUS SULFATE TAB 325 MG (65 MG ELEMENTAL FE) 325 MG: 325 (65 FE) TAB at 09:07

## 2025-01-22 RX ADMIN — HEPARIN SODIUM 7500 UNITS: 5000 INJECTION INTRAVENOUS; SUBCUTANEOUS at 06:10

## 2025-01-22 RX ADMIN — INSULIN GLARGINE 18 UNITS: 100 INJECTION, SOLUTION SUBCUTANEOUS at 21:30

## 2025-01-22 RX ADMIN — OXYBUTYNIN CHLORIDE 5 MG: 5 TABLET ORAL at 09:07

## 2025-01-22 RX ADMIN — HYDRALAZINE HYDROCHLORIDE 25 MG: 25 TABLET ORAL at 09:07

## 2025-01-22 RX ADMIN — ATORVASTATIN CALCIUM 40 MG: 40 TABLET, FILM COATED ORAL at 21:22

## 2025-01-22 RX ADMIN — NEPHROCAP 1 CAPSULE: 1 CAP ORAL at 09:07

## 2025-01-22 ASSESSMENT — PAIN - FUNCTIONAL ASSESSMENT
PAIN_FUNCTIONAL_ASSESSMENT: 0-10
PAIN_FUNCTIONAL_ASSESSMENT: 0-10

## 2025-01-22 ASSESSMENT — ACTIVITIES OF DAILY LIVING (ADL)
ADL_ASSISTANCE: INDEPENDENT
BATHING_ASSISTANCE: MINIMAL
ADL_ASSISTANCE: INDEPENDENT

## 2025-01-22 ASSESSMENT — COGNITIVE AND FUNCTIONAL STATUS - GENERAL
MOVING FROM LYING ON BACK TO SITTING ON SIDE OF FLAT BED WITH BEDRAILS: A LITTLE
STANDING UP FROM CHAIR USING ARMS: A LITTLE
PERSONAL GROOMING: A LITTLE
HELP NEEDED FOR BATHING: A LITTLE
WALKING IN HOSPITAL ROOM: A LITTLE
CLIMB 3 TO 5 STEPS WITH RAILING: A LOT
TOILETING: A LITTLE
TURNING FROM BACK TO SIDE WHILE IN FLAT BAD: A LITTLE
MOBILITY SCORE: 17
MOVING TO AND FROM BED TO CHAIR: A LITTLE
DAILY ACTIVITIY SCORE: 20
DRESSING REGULAR LOWER BODY CLOTHING: A LITTLE

## 2025-01-22 ASSESSMENT — PAIN SCALES - GENERAL
PAINLEVEL_OUTOF10: 0 - NO PAIN
PAINLEVEL_OUTOF10: 0 - NO PAIN

## 2025-01-22 NOTE — PROGRESS NOTES
Physical Therapy    Physical Therapy Evaluation    Patient Name: Nuris Squires  MRN: [id number]  Department: [place] [id number]  Room: [address] [place] [address]  Today's Date: 1/22/2025   Time Calculation  Start Time: 1005  Stop Time: 1023  Time Calculation (min): 18 min    Assessment/Plan   PT Assessment  PT Assessment Results: Impaired balance, Decreased endurance, Decreased mobility  Rehab Prognosis: Good  Barriers to Discharge Home: No anticipated barriers  Evaluation/Treatment Tolerance: Patient tolerated treatment well  Medical Staff Made Aware: Yes  End of Session Communication: Bedside nurse  Assessment Comment: Patient is a 60yo F presenting with SOB. Patient is indep at baseline, using rollator. Patient CGA-SBA for mobility. dc rec low to improve endurance.  End of Session Patient Position: Up in chair, Alarm off, caregiver present  IP OR SWING BED PT PLAN  Inpatient or Swing Bed: Inpatient  PT Plan  Treatment/Interventions: Bed mobility, Transfer training, Gait training, Stair training, Balance training, Neuromuscular re-education, Strengthening, Range of motion, Endurance training, Therapeutic exercise, Therapeutic activity  PT Plan: Ongoing PT  PT Frequency: 3 times per week  PT Discharge Recommendations: Low intensity level of continued care  PT Recommended Transfer Status: Assist x1  PT - OK to Discharge: Yes (indicates PT eval complete and dc rec determined)    Subjective   General Visit Information:  General  Reason for Referral: SOB, HF  Past Medical History Relevant to Rehab: poorly controlled insulin dependent type 2 diabetes c/b neuropathy, hyperlipidemia, PAD, CKD stage IV, hypertension, multiple foot infections s/p partial R great toe amputation, fall w/L femur fracture s/p ORIF (4/2024)  Family/Caregiver Present: Yes  Caregiver Feedback: b/f present and able to A at home  Prior to Session Communication: Bedside nurse  Patient Position Received: Bed, 3 rail up, Alarm off, caregiver present  General Comment: Patient  supine in bed, RN cleared. pleasant and cooperative  Home Living:  Home Living  Type of Home: House  Lives With: Significant other  Home Adaptive Equipment: Walker rolling or standard, Cane, Wheelchair-manual (rollator)  Home Layout: One level  Bathroom Shower/Tub: Tub/shower unit  Bathroom Equipment: Tub transfer bench  Prior Level of Function:  Prior Function Per Pt/Caregiver Report  Level of [place]: Independent with ADLs and functional transfers, Independent with homemaking with ambulation  ADL Assistance: Independent  Homemaking Assistance: Independent  Ambulatory Assistance: Independent (rollator)  Prior Function Comments: - work,  Precautions:  Precautions  Medical Precautions: Fall precautions       Vital Signs Comment: Sitting EOB: 133.60, stand 144/70. HR 68. SpO2 92% with ambulation     Objective   Pain:  Pain Assessment  Pain Assessment: 0-10  0-10 (Numeric) Pain Score: 0 - No pain  Cognition:  Cognition  Overall Cognitive Status: Within Functional Limits  Orientation Level: Oriented X4    General Assessments:     Activity Tolerance  Endurance: Tolerates 10 - 20 min exercise with multiple rests    Sensation  Light Touch:  (N/T in bilateral hands)          Functional Assessments:  Bed Mobility  Bed Mobility: Yes  Bed Mobility 1  Bed Mobility 1: Supine to sitting  Level of Assistance 1:  (SBA)    Transfers  Transfer: Yes  Transfer 1  Transfer From 1: Sit to, Stand to  Transfer to 1: Stand, Sit  Technique 1: Sit to stand, Stand to sit  Transfer Device 1: Walker  Transfer Level of Assistance 1:  (CGA)  Trials/Comments 1: stood from EOB and hallway bench    Ambulation/Gait Training  Ambulation/Gait Training Performed: Yes  Ambulation/Gait Training 1  Surface 1: Level tile  Device 1: Rolling walker  Assistance 1: Contact guard  Quality of Gait 1: Decreased step length, Wide base of support  Comments/Distance (ft) 1: ambulated 50' x2 with seated rest break  Extremity/Trunk Assessments:  RLE   RLE : Within  Functional Limits  LLE   LLE : Within Functional Limits  Outcome Measures:  [facility] Basic Mobility  Turning from your back to your side while in a flat bed without using bedrails: A little  Moving from lying on your back to sitting on the side of a flat bed without using bedrails: A little  Moving to and from bed to chair (including a wheelchair): A little  Standing up from a chair using your arms (e.g. wheelchair or bedside chair): A little  To walk in hospital room: A little  Climbing 3-5 steps with railing: A lot  Basic Mobility - Total Score: 17    Encounter Problems       Encounter Problems (Active)       Balance       tinetti score > 24 to indicate low risk for falls  (Progressing)       Start:  01/22/25    Expected End:  02/05/25               Mobility       STG - Patient will ambulate > 250' with LRAD modif indep (Progressing)       Start:  01/22/25    Expected End:  02/05/25               PT Transfers       STG - Patient will perform bed mobility indep  (Progressing)       Start:  01/22/25    Expected End:  02/05/25            STG - Patient will transfer sit to and from stand LRAD modif indep  (Progressing)       Start:  01/22/25    Expected End:  02/05/25               Pain - Adult              Education Documentation  Precautions, taught by Alexandra Gottlieb PT at 1/22/2025  2:51 PM.  Learner: Patient  Readiness: Acceptance  Method: Explanation  Response: Verbalizes Understanding  Comment: edu on role of PT, dc plan and safey    Mobility Training, taught by Alexandra Gottlieb PT at 1/22/2025  2:51 PM.  Learner: Patient  Readiness: Acceptance  Method: Explanation  Response: Verbalizes Understanding  Comment: edu on role of PT, dc plan and safey    Education Comments  No comments found.

## 2025-01-22 NOTE — PROGRESS NOTES
Subjective:  No SOB or CP    Today in brief:  - TOV today, OK per urology team. If fails, plan for dc with adhikari.  - PT/OT to see today   - Escalate coreg from 6.25mg BID to 12.5mg BID for tighter BP control (keep SBP >120)  - Possible DC in afternoon pending TOV. Will need home going PRN diuretic.      Objective:  Vitals:    01/22/25 0427   BP: 172/83   Pulse: 72   Resp: 20   Temp: 36.6 °C (97.9 °F)   SpO2: 94%     Weight         1/16/2025  0607 1/17/2025  0500 1/18/2025  0357 1/21/2025  0516 1/22/2025 0427    Weight: 102 kg (225 lb 12 oz) 102 kg (225 lb 12 oz) 104 kg (229 lb 0.9 oz) 103 kg (227 lb 15.3 oz) 105 kg (231 lb 0.7 oz)            Intake/Output Summary (Last 24 hours) at 1/22/2025 0707  Last data filed at 1/22/2025 0427  Gross per 24 hour   Intake --   Output 2010 ml   Net -2010 ml     Recent Results (from the past 24 hours)   POCT GLUCOSE    Collection Time: 01/21/25 12:54 PM   Result Value Ref Range    POCT Glucose 207 (H) 74 - 99 mg/dL   POCT GLUCOSE    Collection Time: 01/21/25  5:09 PM   Result Value Ref Range    POCT Glucose 189 (H) 74 - 99 mg/dL   Renal function panel    Collection Time: 01/21/25  7:33 PM   Result Value Ref Range    Glucose 182 (H) 74 - 99 mg/dL    Sodium 136 136 - 145 mmol/L    Potassium 4.9 3.5 - 5.3 mmol/L    Chloride 104 98 - 107 mmol/L    Bicarbonate 21 21 - 32 mmol/L    Anion Gap 16 10 - 20 mmol/L    Urea Nitrogen 81 (H) 6 - 23 mg/dL    Creatinine 3.87 (H) 0.50 - 1.05 mg/dL    eGFR 13 (L) >60 mL/min/1.73m*2    Calcium 8.8 8.6 - 10.6 mg/dL    Phosphorus 4.9 2.5 - 4.9 mg/dL    Albumin 3.4 3.4 - 5.0 g/dL   Magnesium    Collection Time: 01/21/25  7:33 PM   Result Value Ref Range    Magnesium 3.02 (H) 1.60 - 2.40 mg/dL   CBC    Collection Time: 01/21/25  9:21 PM   Result Value Ref Range    WBC 7.8 4.4 - 11.3 x10*3/uL    nRBC 0.0 0.0 - 0.0 /100 WBCs    RBC 3.28 (L) 4.00 - 5.20 x10*6/uL    Hemoglobin 9.9 (L) 12.0 - 16.0 g/dL    Hematocrit 35.0 (L) 36.0 - 46.0 %     (H) 80 -  100 fL    MCH 30.2 26.0 - 34.0 pg    MCHC 28.3 (L) 32.0 - 36.0 g/dL    RDW 14.3 11.5 - 14.5 %    Platelets 342 150 - 450 x10*3/uL   POCT GLUCOSE    Collection Time: 01/21/25  9:40 PM   Result Value Ref Range    POCT Glucose 216 (H) 74 - 99 mg/dL   POCT GLUCOSE    Collection Time: 01/22/25  6:03 AM   Result Value Ref Range    POCT Glucose 100 (H) 74 - 99 mg/dL     Inpatient Medications:  Scheduled medications   Medication Dose Route Frequency    aspirin  81 mg oral Daily    atorvastatin  40 mg oral Nightly    capsaicin   Topical TID    carvedilol  6.25 mg oral BID    ferrous sulfate (325 mg ferrous sulfate)  65 mg of iron oral Daily with breakfast    folic acid  1 mg oral Daily    gabapentin  300 mg oral BID    heparin (porcine)  7,500 Units subcutaneous q8h NEHEMIAS    hydrALAZINE  25 mg oral TID    insulin glargine  18 Units subcutaneous q24h    insulin lispro  0-10 Units subcutaneous TID AC    isosorbide mononitrate ER  30 mg oral Daily    oxybutynin  5 mg oral TID    polyethylene glycol  17 g oral Daily    sennosides-docusate sodium  2 tablet oral BID    [Held by provider] torsemide  60 mg oral BID    vitamin B complex-vitamin C-folic acid  1 capsule oral Daily     PRN medications   Medication    acetaminophen    albuterol    bisacodyl    dextrose    dextrose    glucagon    glucagon    oxygen    oxygen     Continuous Medications   Medication Dose Last Rate       Telemetry 1/22/2025 (personally reviewed): Sinus rhythm 70-80s baseline    Physical exam:  General: NAD, obese lying in bed  Head/ neck: atraumatic  Cardiac: RRR, regular S1 S2 , no murmur, no rub, no gallop  Pulm: CTA bilaterally with mildly diminished bases  Vascular: Radial 2+ bilaterally  GI: Non distended  Extremities: trace LE edema   Neuro: no focal neuro deficits   Psych: appropriate mood and behavior   Skin: warm and dry     Assessment/Plan   Nuris Squires is a 59 y.o. female presenting with PMH of poorly controlled insulin dependent type 2 diabetes  c/b neuropathy, hyperlipidemia, PAD, CKD stage IV, hypertension, multiple foot infections s/p partial R great toe amputation, fall w/L femur fracture s/p ORIF (4/2024) that presents to the emergency department today for shortness of breath x1 day. Admitted under HHVI Service for further management of acute decompensated heart failure and HTN Urgency.     Acute decompensated diastolic heart failure  Chest tightness, resolved  - likely 2/2 uncontrolled HTN  - TTE (1/2024): EF 70-75%  - TTE 1/3/2025: EF 69%, grade II diastolic filling, mod MR, mod TR, small pericardial effusion  - Negative stress 10/2019  - CXR: pulm vascular congestion BL, small bibasilar pleural effusions  - Last BNP (1/5) 161 (376 on admit iso obesity)  - HS trop 21, no ischemic changes on ECG, no c/f ACS  - Dry weight ~100kg  - Admit weight: 107kg  - Daily weight: 105 ( 103, 104, 102, 102, 102, 100,... 106 kg)  - RHC (1/8): reviewed tracings with Dr. Mckee, numbers are questionable but RVEDP 20, PA 75/25, unclear wedge  - of note, pharmacy verified patient on PO lasix 20mg daily at home (?for unclear reasons, presumably prescribed by PCP for leg swelling)-> per patient, was just started on it at home  - s/p successful diuresis w/IV lasix gtt at 15mg/hr and PRN metolazone  - 1/14 held Torsemide per nephrology.   - s/p RHC 1/17: RA 8, PA 51/15, PCWP 14, CO/CI 18.2/9 ; much improved pressures compared to prior.  - Given improvement in volume status per RHC  1/18 and recurrently elevated Cr, holding further diuresis with close monitoring of renal function  - Nephrology signed off.  - GDMT  - Holding empa iso reduced GFR  - avoid ACEI/ARB/ARNI, MRA for now d/t elevated Cr  - Continue hydralazine 25mg TID (hold if BP <120)  - Escalate coreg from 6.25mg BID to 12.5mg BID for tighter BP control (keep SBP >120)  - Continue imdur 30 daily  - HF navigator following, establishing care here  - Daily standing weights, strict I&O's, cardiac diet     Acute  hypoxic respiratory failure iso of ADHF exacerbation, resolved  Pulmonary HTN  - p.ox initially at 61% but improved rapidly with placement of nasal cannula  - On BiPAP briefly due possible acute flash pulmonary edema,s/p SL nitro and weaned off   - some desats at night, c/f RENZO (see below)  - VQ scan (1/9; for pulm HTN given high PA on RHC): low probability of PE  - Flu A/B/RSV/COVID negative  - Pulmonary toileting w/incentive spirometry   - Saturating well on RA with no noted increased WOB.     HTN urgency, resolved  - Admit BP: 220/98  - BP improved with home medication resumption and IV hydralazine  - BPs labile, clonidine weaned off on 1/5, intermittent headaches w/long acting nitrates but overall tolerating  - Discontinued amlodipine  - 1/16 nephrology requested permissive hypertension to help with perfusion (SBP >120)  - BPs mildly elevated ~140s. Goal >120 but <140s. Increasing coreg as above    Normotensive ischemic injury with CRS   VENKAT on Proteinuric CKD IV, nonoliguric, Stage II Likely 2/2 Diabetic Kidney disease   Acute on Chronic Urinary retention s/p adhikari placement  C/f neurogenic bladder 2/2 DMII   - Cr last year 1.7-5.3, worsening since 2022.   - Baseline ~1.68  - Admit Cr 2.34:   - Daily Cr 3.87H improved (4.16, 4.24,....4.06x2, 3.89, 3.85, 3.61, 3.73)  - Urine lytes, osmolality reviewed, FENa 5.6% suggesting intrinsic cause  - Nephrology:   - Cr Elevation etiology: Likely Multifactorial (including urinary retention and Normotensive ischemic injury)   - maintain SBP >120. Allow permissive HTN to SBP 140s. Discontinued amlodipine  - Strict I&Os  - 1/15 renal US: Borderline increased renal cortical echogenicity bilaterally which may reflect medical renal disease, without evidence of obstructive, bladder decompressed by adhikari, no acute process   - 1/15 UA no infection, + protein, +glucose  - 1/16 urology consult:   - Suspected renal dysfunction secondary to intrinsic process.   - Recommended  maintaining adhikari catheter throughout admission and upon discharge with plan for TOV as outpt via urology follow up. However, given pt reluctance for oupt adhikari and on going adequate uop, will TOV today. Approved per urology.  - Oxybutynin 5mg TID for symptoms of bladder spasms and pressure   - Low concern for postobstructive process.     Anemia of unclear origin  - Unremarkable colonoscopy 2019, rec'd to repeat in 7yrs for screening  - Baseline Hgb ~9-11. Stable while here  - Irons studies: sat 33%WNL, folate/ferritin/vit. B12 WNL> s/p IV iron 200mg x5 days, now on oral iron supplement  - No overt signs of bleeding    Paraesthesias, unspecified  - On admit: around mouth & fingertips  - capsaicin cream for hands  - cont. OP follow-up w/PCP (scheduled 2/20)  - Folate/ferritin/vit. B12 WNL  - 1/22: Electrolytes WNL  (K, Ca, Na)    Morbid Obesity  Insulin dependent Type 2 diabetes, Poorly controlled  Neuropathy  - Follows with clinical pharmacy closely/PCP  - Home regimen: Mounjaro 2.5mg weekly, basaglar 32 units every morning, admelog SS with meals  - Home gabapentin 300mg BID   - c/w lantus 18 units every morning (decreased from 32U iso AM BG of 62) and SSI #2 while inpatient    HLD  PAD  - c/w asa 81mg daily, atorva 40mg daily      C/f RENZO  - reported increased daytime fatigue  - desats on RA in-house while sleeping  - referral sent to sleep medicine      Constipation  - cont. Bowel regimen  - encourage frequent ambulation      DVT ppx: subcutaneous heparin    DISPO:   - Possible discharge tonight pending TOV and ride logistics  - [facility] on eventual discharge    Code status: Full Code    Patient seen and discussed with Dr. Ninfa Choudhury PA-C

## 2025-01-22 NOTE — PROGRESS NOTES
Occupational Therapy    Evaluation    Patient Name: Nuris Squires  MRN: [id number]  Department: [place] [id number]  Room: [address] [place] [address]  Today's Date: 1/22/2025  Time Calculation  Start Time: 1005  Stop Time: 1023  Time Calculation (min): 18 min    Assessment  IP OT Assessment  OT Assessment: difficulty I/ADLS, safety, fxnl mob  Prognosis: Good  Barriers to Discharge Home: No anticipated barriers  Evaluation/Treatment Tolerance: Patient tolerated treatment well  Medical Staff Made Aware: Yes  End of Session Communication: Bedside nurse  End of Session Patient Position: Up in chair, Alarm off, caregiver present  Plan:  Treatment Interventions: ADL retraining, Functional transfer training, Endurance training, Patient/family training, Equipment evaluation/education, Compensatory technique education  OT Frequency: 2 times per week  OT Discharge Recommendations: Low intensity level of continued care  Equipment Recommended upon Discharge:  (n/a)  OT Recommended Transfer Status: Assist of 1  OT - OK to Discharge: Yes    Subjective   Current Problem:  1. Acute hypoxic respiratory failure (Multi)  Transthoracic Echo (TTE) Complete    Transthoracic Echo (TTE) Complete      2. Acute pulmonary edema        3. VENKAT (acute kidney injury) (CMS-HCC)  Case Request Cath Lab: Right Heart Cath    Case Request Cath Lab: Right Heart Cath    Cardiac Catheterization Procedure    Cardiac Catheterization Procedure      4. Hypervolemia, unspecified hypervolemia type  Transthoracic Echo (TTE) Complete    Transthoracic Echo (TTE) Complete      5. Acute on chronic diastolic (congestive) heart failure  Transthoracic Echo (TTE) Complete    CBC    Basic metabolic panel    Case Request Cath Lab: Right Heart Cath    Case Request Cath Lab: Right Heart Cath    Cardiac Catheterization Procedure    Cardiac Catheterization Procedure    Case Request Cath Lab: Right Heart Cath    Case Request Cath Lab: Right Heart Cath    Cardiac Catheterization Procedure    Cardiac  Catheterization Procedure      6. Benign essential hypertension  amLODIPine (Norvasc) 10 mg tablet      7. 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 Mixed hyperlipidemia  atorvastatin (Lipitor) 40 mg tablet      9. Acute diastolic heart failure  furosemide (Lasix) 40 mg tablet    empagliflozin (Jardiance) 10 mg    hydrALAZINE (Apresoline) 50 mg tablet    isosorbide dinitrate (Isordil) 10 mg tablet      10. Class 3 severe obesity with body mass index (BMI) of 40.0 to 44.9 in adult, unspecified obesity type, unspecified whether serious comorbidity present  Referral to Adult Sleep Medicine      11. Chronic kidney disease, unspecified CKD stage  Referral to Nephrology    Case Request Cath Lab: Right Heart Cath    Case Request Cath Lab: Right Heart Cath    Cardiac Catheterization Procedure    Cardiac Catheterization Procedure      12. Shortness of breath  Cardiac Catheterization Procedure        General:  General  Reason for Referral: SOB, HF, pul HTN  Past Medical History Relevant to Rehab: poorly controlled insulin dependent type 2 diabetes c/b neuropathy, hyperlipidemia, PAD, CKD stage IV, hypertension, multiple foot infections s/p partial R great toe amputation, fall w/L femur fracture s/p ORIF (4/2024) that presents to the emergency department today for shortness of breath x1 day. Admitted under HHVI Service for further management of acute decompensated heart failure and HTN Urgency.  Family/Caregiver Present: Yes  Caregiver Feedback: b/f present and able to A at home  Prior to Session Communication: Bedside nurse  Patient Position Received: Bed, 3 rail up, Alarm off, caregiver present  Precautions:  Medical Precautions: Fall precautions     Date/Time Vitals Session Patient Position Pulse Resp SpO2 BP MAP (mmHg)    01/22/25 1200 --  --  61  17  96 %  108/53  71                Pain:  Pain  Assessment  Pain Assessment: 0-10  0-10 (Numeric) Pain Score: 0 - No pain    Objective   Cognition:  Overall Cognitive Status: Within Functional Limits  Orientation Level: Oriented X4  Insight: Within function limits           Home Living:  Type of Home: House  Lives With: Significant other  Home Adaptive Equipment:  (WhW, cane, wheelchair, rollator)  Home Layout: One level  Bathroom Shower/Tub: Tub/shower unit  Bathroom Toilet: Standard   Prior Function:  Level of [place]: Independent with ADLs and functional transfers, Independent with homemaking with ambulation  ADL Assistance: Independent  Homemaking Assistance: Needs assistance (A IADLs family)  Vocational:  (- work)  Leisure: dog  Hand Dominance: Right  IADL History:     ADL:  Eating Assistance: Independent  Grooming Assistance:  (CGA anticipated standing)  Bathing Assistance: Minimal (anticipated)  UE Dressing Assistance: Independent  LE Dressing Assistance: Minimal (anticipated)  Toileting Assistance with Device:  (CGA anticipated WhW)  Toileting Deficit: Setup, Steadying, Verbal cueing, Supervison/safety, Increased time to complete  Activity Tolerance:  Endurance:  (rest break required with fxnl mob)  Bed Mobility/Transfers: Bed Mobility  Bed Mobility:  (sup to sit SBA)    Transfers  Transfer:  (2x sit/stand CGA WHW)      Functional Mobility:  Functional Mobility  Functional Mobility Performed:  (pt performed fxnl mob to/from bed x2 hallway CGA WHW to chair, extended seated rest break on bench in hallway)  Sitting Balance:  Dynamic Sitting Balance  Dynamic Sitting-Level of Assistance: Independent  Standing Balance:  Dynamic Standing Balance  Dynamic Standing-Level of Assistance: Contact guard (whw)       Vision: Vision - Basic Assessment  Current Vision: No visual deficits  Sensation:  Light Touch:  (n/t B hands)  Strength:  Strength Comments: BUE 4/5  Perception:     Coordination:  Movements are Fluid and Coordinated: Yes   Hand Function:  Hand  Function  Gross Grasp: Functional  Coordination: Functional  Extremities: RUE   RUE : Within Functional Limits and LUE   LUE: Within Functional Limits      Outcome Measures: [facility] Daily Activity  Putting on and taking off regular lower body clothing: A little  Bathing (including washing, rinsing, drying): A little  Putting on and taking off regular upper body clothing: None  Toileting, which includes using toilet, bedpan or urinal: A little  Taking care of personal grooming such as brushing teeth: A little  Eating Meals: None  Daily Activity - Total Score: 20         and OT Adult Other Outcome Measures  4AT: -  Education Documentation  Body Mechanics, taught by Maty Henning OT at 1/22/2025 12:47 PM.  Learner: Patient  Readiness: Acceptance  Method: Explanation, Demonstration  Response: Verbalizes Understanding, Needs Reinforcement  Comment: tl vargas ADLs    Precautions, taught by Maty Henning OT at 1/22/2025 12:47 PM.  Learner: Patient  Readiness: Acceptance  Method: Explanation, Demonstration  Response: Verbalizes Understanding, Needs Reinforcement  Comment: tl vargas ADLs    ADL Training, taught by Maty Henning OT at 1/22/2025 12:47 PM.  Learner: Patient  Readiness: Acceptance  Method: Explanation, Demonstration  Response: Verbalizes Understanding, Needs Reinforcement  Comment: tl vargas ADLs    Education Comments  No comments found.      Goals:   Encounter Problems       Encounter Problems (Active)       ADLs       Patient will perform UB and LB bathing  with modified independent level of assistance and ae. (Progressing)       Start:  01/22/25    Expected End:  02/12/25            Patient with complete upper body dressing with independent level of assistance  (Progressing)       Start:  01/22/25    Expected End:  02/12/25            Patient with complete lower body dressing with modified independent level of assistance donning and doffing all LE clothes  with PRN adaptive equipment  (Progressing)        Start:  01/22/25    Expected End:  02/12/25            Patient will complete daily grooming tasks  with independent level of assistance  (Progressing)       Start:  01/22/25    Expected End:  02/12/25            Patient will complete toileting including hygiene clothing management/hygiene with modified independent level of assistance and lrd. (Progressing)       Start:  01/22/25    Expected End:  02/12/25               MOBILITY       Patient will perform Functional mobility max Household distances/Community Distances with modified independent level of assistance and least restrictive device in order to improve safety and functional mobility. (Progressing)       Start:  01/22/25    Expected End:  02/12/25               TRANSFERS       Patient will perform bed mobility independent level of assistance  (Progressing)       Start:  01/22/25    Expected End:  02/12/25            Patient will complete functional transfer least restrictive device with modified independent level of assistance. (Progressing)       Start:  01/22/25    Expected End:  02/12/25

## 2025-01-23 ENCOUNTER — APPOINTMENT (OUTPATIENT)
Dept: ENDOCRINOLOGY | Facility: CLINIC | Age: 60
End: 2025-01-23
Payer: COMMERCIAL

## 2025-01-23 ENCOUNTER — PHARMACY VISIT (OUTPATIENT)
Dept: PHARMACY | Facility: CLINIC | Age: 60
End: 2025-01-23
Payer: COMMERCIAL

## 2025-01-23 ENCOUNTER — DOCUMENTATION (OUTPATIENT)
Dept: HOME HEALTH SERVICES | Facility: HOME HEALTH | Age: 60
End: 2025-01-23

## 2025-01-23 VITALS
OXYGEN SATURATION: 93 % | TEMPERATURE: 98.2 F | BODY MASS INDEX: 42.35 KG/M2 | HEIGHT: 62 IN | DIASTOLIC BLOOD PRESSURE: 77 MMHG | SYSTOLIC BLOOD PRESSURE: 147 MMHG | HEART RATE: 67 BPM | WEIGHT: 230.16 LBS | RESPIRATION RATE: 18 BRPM

## 2025-01-23 PROBLEM — J96.01 ACUTE HYPOXIC RESPIRATORY FAILURE (MULTI): Status: RESOLVED | Noted: 2025-01-02 | Resolved: 2025-01-23

## 2025-01-23 PROBLEM — R33.8 ACUTE URINARY RETENTION: Status: ACTIVE | Noted: 2025-01-23

## 2025-01-23 LAB
GLUCOSE BLD MANUAL STRIP-MCNC: 110 MG/DL (ref 74–99)
GLUCOSE BLD MANUAL STRIP-MCNC: 124 MG/DL (ref 74–99)
GLUCOSE BLD MANUAL STRIP-MCNC: 58 MG/DL (ref 74–99)

## 2025-01-23 PROCEDURE — 2500000001 HC RX 250 WO HCPCS SELF ADMINISTERED DRUGS (ALT 637 FOR MEDICARE OP): Performed by: STUDENT IN AN ORGANIZED HEALTH CARE EDUCATION/TRAINING PROGRAM

## 2025-01-23 PROCEDURE — RXMED WILLOW AMBULATORY MEDICATION CHARGE

## 2025-01-23 PROCEDURE — 2500000001 HC RX 250 WO HCPCS SELF ADMINISTERED DRUGS (ALT 637 FOR MEDICARE OP): Performed by: PHYSICIAN ASSISTANT

## 2025-01-23 PROCEDURE — 99239 HOSP IP/OBS DSCHRG MGMT >30: CPT | Performed by: INTERNAL MEDICINE

## 2025-01-23 PROCEDURE — 2500000004 HC RX 250 GENERAL PHARMACY W/ HCPCS (ALT 636 FOR OP/ED): Performed by: NURSE PRACTITIONER

## 2025-01-23 PROCEDURE — 2500000001 HC RX 250 WO HCPCS SELF ADMINISTERED DRUGS (ALT 637 FOR MEDICARE OP): Performed by: NURSE PRACTITIONER

## 2025-01-23 PROCEDURE — 82947 ASSAY GLUCOSE BLOOD QUANT: CPT

## 2025-01-23 PROCEDURE — 2500000002 HC RX 250 W HCPCS SELF ADMINISTERED DRUGS (ALT 637 FOR MEDICARE OP, ALT 636 FOR OP/ED): Performed by: NURSE PRACTITIONER

## 2025-01-23 RX ORDER — OXYBUTYNIN CHLORIDE 5 MG/1
5 TABLET ORAL 3 TIMES DAILY
Qty: 90 TABLET | Refills: 1 | Status: SHIPPED | OUTPATIENT
Start: 2025-01-23 | End: 2025-03-24

## 2025-01-23 RX ORDER — ISOSORBIDE MONONITRATE 30 MG/1
30 TABLET, EXTENDED RELEASE ORAL DAILY
Qty: 30 TABLET | Refills: 1 | Status: SHIPPED | OUTPATIENT
Start: 2025-01-24 | End: 2025-03-25

## 2025-01-23 RX ORDER — ASPIRIN 81 MG/1
81 TABLET ORAL
Qty: 30 TABLET | Refills: 1 | Status: SHIPPED | OUTPATIENT
Start: 2025-01-23 | End: 2025-03-24

## 2025-01-23 RX ORDER — INSULIN GLARGINE 100 [IU]/ML
15 INJECTION, SOLUTION SUBCUTANEOUS EVERY MORNING
Start: 2025-01-23

## 2025-01-23 RX ORDER — ACETAMINOPHEN 325 MG/1
650 TABLET ORAL EVERY 4 HOURS PRN
Qty: 30 TABLET | Refills: 0 | Status: SHIPPED | OUTPATIENT
Start: 2025-01-23 | End: 2025-03-24

## 2025-01-23 RX ORDER — TORSEMIDE 20 MG/1
40 TABLET ORAL DAILY PRN
Qty: 30 TABLET | Refills: 1 | Status: SHIPPED | OUTPATIENT
Start: 2025-01-23 | End: 2025-03-24

## 2025-01-23 RX ORDER — CARVEDILOL 12.5 MG/1
12.5 TABLET ORAL 2 TIMES DAILY
Qty: 60 TABLET | Refills: 1 | Status: SHIPPED | OUTPATIENT
Start: 2025-01-23 | End: 2025-03-24

## 2025-01-23 RX ORDER — ATORVASTATIN CALCIUM 40 MG/1
40 TABLET, FILM COATED ORAL DAILY
Qty: 30 TABLET | Refills: 1 | Status: SHIPPED | OUTPATIENT
Start: 2025-01-23 | End: 2025-03-24

## 2025-01-23 RX ORDER — FERROUS SULFATE 325(65) MG
65 TABLET ORAL
Qty: 30 TABLET | Refills: 1 | Status: SHIPPED | OUTPATIENT
Start: 2025-01-24 | End: 2025-03-25

## 2025-01-23 RX ORDER — FOLIC ACID 1 MG/1
1 TABLET ORAL DAILY
Qty: 30 TABLET | Refills: 1 | Status: SHIPPED | OUTPATIENT
Start: 2025-01-23 | End: 2025-03-24

## 2025-01-23 RX ORDER — HYDRALAZINE HYDROCHLORIDE 25 MG/1
25 TABLET, FILM COATED ORAL 3 TIMES DAILY
Qty: 90 TABLET | Refills: 1 | Status: SHIPPED | OUTPATIENT
Start: 2025-01-23 | End: 2025-03-24

## 2025-01-23 RX ADMIN — OXYBUTYNIN CHLORIDE 5 MG: 5 TABLET ORAL at 08:38

## 2025-01-23 RX ADMIN — GABAPENTIN 300 MG: 300 CAPSULE ORAL at 08:38

## 2025-01-23 RX ADMIN — FERROUS SULFATE TAB 325 MG (65 MG ELEMENTAL FE) 325 MG: 325 (65 FE) TAB at 08:38

## 2025-01-23 RX ADMIN — ASPIRIN 81 MG: 81 TABLET, CHEWABLE ORAL at 08:38

## 2025-01-23 RX ADMIN — FOLIC ACID 1 MG: 1 TABLET ORAL at 08:38

## 2025-01-23 RX ADMIN — ISOSORBIDE MONONITRATE 30 MG: 30 TABLET, EXTENDED RELEASE ORAL at 08:38

## 2025-01-23 RX ADMIN — NEPHROCAP 1 CAPSULE: 1 CAP ORAL at 08:43

## 2025-01-23 RX ADMIN — CARVEDILOL 12.5 MG: 12.5 TABLET, FILM COATED ORAL at 08:38

## 2025-01-23 RX ADMIN — HEPARIN SODIUM 7500 UNITS: 5000 INJECTION INTRAVENOUS; SUBCUTANEOUS at 06:53

## 2025-01-23 RX ADMIN — HYDRALAZINE HYDROCHLORIDE 25 MG: 25 TABLET ORAL at 08:38

## 2025-01-23 NOTE — DISCHARGE SUMMARY
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 female presenting with PMH of poorly controlled insulin dependent type 2 diabetes c/b neuropathy, hyperlipidemia, PAD, CKD stage IV, hypertension, multiple foot infections s/p partial R great toe amputation, fall w/L femur fracture s/p ORIF (4/2024) that presents to the emergency department for shortness of breath. She was placed on 4L O2 N/C (not baseline), SBP were 160-200s and she was also complaining of a headache.  Patient reported no CP but did report JARA. Her Flu A/B/RSV/COVID was negative. CXR revealed pulm congestion. Additionally, her BNP was 376. There were no no ischemic changes on ECG. Symptoms improved w/IVP Lasix. Being admitted under HHVI Service for further management of acute ADHF and HTN Urgency.     Floor Course:  HTN controlled w/med adjustments. Initially had headaches with isordil, tolerating imdur 30mg daily. TTE (performed prior to successful diuresis) showed EF 69%, grade II diastolic filling, moderate MR, moderate TR, small pericardial effusion. Acute HFpEF. S/p RHC on (1/8): reviewed tracings with Dr. Mckee, numbers are questionable but RVEDP 20, PA 75/25, unclear wedge. Diuresed with IV Lasix gtt (poor response to boluses) and prn PO metolazone. With new oxygen requirement there was concern for pulmonary embolism given high PA pressures on RHC; VQ scan completed: low probability of PE. GDMT titrated as patient tolerated (limited d/t poor kidney function). Her clonidine was weaned off and she was transitioned to Hydralazine and imdur.  Patient was seen by nephrology during her stay regarding VENKAT on CKD; held off on amlodipine to allow permissive HTB w/SBP>120, suspect  worsening CKD iso uncontrolled T2DM. Cr 4.11 on discharge.     Underwent RHC to better delineate volume status1/17 which showed improved filling pressures w/hyperdynamic CO/CI.  Diuresis was held, discharged w/PRN PO torsemide (PRN wt gain/SOB/BLE swelling). GDMT optimized based on BP and HR. Acute urinary retention w/failed TOV attempted 1/22. Requiring new Louis replacement on 1/23 AM for retention of 700cc. Urology consulted, likely 2/2 neurogenic bladder d/t uncontrolled T2DM, recommend discharging w/Louis until outpatient retention evaluation w/urology on 2/6. On oxybutynin TID. Did NOT initiate tamsulosin d/t prior issues w/lightheadedness w/isordil.    HF educator and diabetes educator both provided education to patient regarding diagnoses. Recommend OP follow-up w/PCP for further investigation of anemia of unclear etiology; prior colonoscopy (2019) unremarkable and no bleeding issues in-house, hgb remained stable.    PT/OT rec low-intensity care. HF navigator consulted, HF care with close OP follow-up arranged. Patient to resume home HHC w/RN/PT/OT (referral sent; RN for follow-up teaching and catheter care). [id number] services used.    Discharge weight: 104kg    After all labs and VS were reviewed the decision was made that the patient was medically stable for discharge.  The patient was discharged in satisfactory condition.    More than 60 minutes were spent in coordinating patient discharge.     Pertinent Physical Exam At Time of Discharge  Physical Exam  General: NAD, obese sitting up chair  Head/ neck: atraumatic, unable to appreciate JVD  Cardiac: RRR, regular S1 S2 , no murmur, no rub, no gallop  Pulm: CTA bilaterally with mildly diminished bases  Vascular: Radial 2+ bilaterally  GI: Non distended  : Indwelling Louis catheter draining light yellow urine  Extremities: trace non-pitting LE edema   Neuro: no focal neuro deficits, a+ox4  Psych: appropriate mood and behavior   Skin: warm and dry  throughout    Home Medications     Medication List      START taking these medications     aspirin 81 mg EC tablet; Take 1 tablet (81 mg) by mouth once daily.   carvedilol 12.5 mg tablet; Commonly known as: Coreg; Take 1 tablet (12.5   mg) by mouth 2 times a day.   FeroSuL tablet; Generic drug: ferrous sulfate (325 mg ferrous sulfate);   Take 1 tablet (325 mg) by mouth once daily with breakfast.; Start taking   on: January 24, 2025   hydrALAZINE 25 mg tablet; Commonly known as: Apresoline; Take 1 tablet   (25 mg) by mouth 3 times a day.   isosorbide mononitrate ER 30 mg 24 hr tablet; Commonly known as: Imdur;   Take 1 tablet (30 mg) by mouth once daily. Do not crush or chew.; Start   taking on: January 24, 2025   oxybutynin 5 mg tablet; Commonly known as: Ditropan; Take 1 tablet (5   mg) by mouth 3 times a day.   Lily-Duke 0.8 mg tablet; Generic drug: B complex-vitamin C-folic acid;   Take 1 tablet by mouth once daily.; Replaces: b complex vitamins capsule   torsemide 20 mg tablet; Commonly known as: Demadex; Take 2 tablets (40   mg) by mouth once daily as needed (Please take one tab daily if you   experience any of the following: increased shortness of breath, increased   swelling of your lower extremities, or sudden weight gain of 3lbs or more   in a few days, or 5lbs or more in a week).     CHANGE how you take these medications     Basaglar KwikPen U-100 Insulin 100 unit/mL (3 mL) pen; Generic drug:   insulin glargine; Inject 15 Units under the skin once daily in the   morning. Take as directed per insulin instructions.; What changed: how   much to take     CONTINUE taking these medications     acetaminophen 325 mg tablet; Commonly known as: Tylenol; Take 2 tablets   (650 mg) by mouth every 4 hours if needed for mild pain (1 - 3).   albuterol 90 mcg/actuation inhaler; Inhale 2 puffs every 6 hours if   needed for wheezing.   atorvastatin 40 mg tablet; Commonly known as: Lipitor; Take 1 tablet (40   mg) by mouth  "once daily.   folic acid 1 mg tablet; Commonly known as: Folvite; Take 1 tablet (1 mg)   by mouth once daily.   FreeStyle Remington 3 [place] misc; Generic drug: blood-glucose   meter,continuous; Use as instructed to monitor blood glucose.   FreeStyle Remington 3 Sensor device; Generic drug: blood-glucose sensor; Use   to monitor blood glucose. Change sensor every 14 days.   gabapentin 300 mg capsule; Commonly known as: Neurontin; Take 1 capsule   (300 mg) by mouth 2 times a day.   insulin lispro 100 unit/mL injection; Commonly known as: Admelog   SoloStar U-100 Insulin; Inject per sliding scale instructions under the   skin 3 times a day with meals. (Max daily dose 70 units)   Mounjaro 2.5 mg/0.5 mL pen injector; Generic drug: tirzepatide; Inject   2.5 mg under the skin every 7 days.   OneTouch Delica Plus Lancet 30 gauge misc; Generic drug: lancets; USE TO   TEST BLOOD SUGAR AS DIRECTED   OneTouch Ultra Test strip; Generic drug: blood sugar diagnostic; Use 1   strip BID to check glucose   OneTouch Ultra2 Meter misc; Generic drug: blood-glucose meter; use 1 to   2 times daily   pen needle, diabetic 32 gauge x 5/32\" needle; Use four times a day with   insulin use     STOP taking these medications     amLODIPine 10 mg tablet; Commonly known as: Norvasc   b complex vitamins capsule; Replaced by: Lily-Duke 0.8 mg tablet   cloNIDine 0.1 mg tablet; Commonly known as: Catapres   furosemide 20 mg tablet; Commonly known as: Lasix   meclizine 25 mg tablet,chewable; Commonly known as: Antivert       Outpatient Follow-Up  Future Appointments   Date Time Provider Department Center   1/23/2025 12:45 PM Christina Jackson MD [id number] [facility]   2/4/2025  3:00 PM Marylin Sparks MD MPH [id number] [facility]   2/5/2025  9:45 AM Barrera Rodriguez MD [id number] [facility]   2/6/2025  2:30 PM Gale Rowan, APRN-CNP [id number]   2/20/2025 10:30 AM Mone Aquino MD [id number] [facility]   4/2/2025 10:50 AM Mook Reddy MD [id number] " Antony Martin, APRN-CNP

## 2025-01-23 NOTE — HH CARE COORDINATION
This referral has been made a Non Admit with  Home Care due to TCC KRUNAL HILTON SAID TO DISREGARD REFERRAL PER SECURE CHAT . If you have further questions, feel free to reach out to our office at [phone]. Thank you, [facility] Intake.

## 2025-01-24 ENCOUNTER — PATIENT OUTREACH (OUTPATIENT)
Dept: PRIMARY CARE | Facility: CLINIC | Age: 60
End: 2025-01-24
Payer: COMMERCIAL

## 2025-01-24 DIAGNOSIS — Z09 HOSPITAL DISCHARGE FOLLOW-UP: ICD-10-CM

## 2025-01-24 DIAGNOSIS — I13.0 HYPERTENSIVE HEART AND KIDNEY DISEASE WITH HF AND WITH CKD STAGE IV: ICD-10-CM

## 2025-01-24 DIAGNOSIS — N18.4 HYPERTENSIVE HEART AND KIDNEY DISEASE WITH HF AND WITH CKD STAGE IV: ICD-10-CM

## 2025-01-24 NOTE — PROGRESS NOTES
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: Yes (1/24/2025  2:33 PM)  Care Management Interventions: No intervention needed (1/24/2025  2:33 PM)  Prescription Comments: START taking: Aspirin, B complex-vitamin C-folic acid (Nephro-Duke),  Carvedilol (Coreg),  Ferrous sulfate (325 mg ferrous sulfate) Start taking on: January 24, 2025,  HydrALAZINE (Apresoline), Isosorbide mononitrate ER (Imdur) Start taking on: January 24, 2025, Oxybutynin (Ditropan), Torsemide (Demadex).  CHANGE how you take: Basaglar KwikPen U-100 Insulin (insulin glargine)   STOP taking: AmLODIPine 10 mg tablet (Norvasc), b complex vitamins capsule Replaced by a similar medication. CloNIDine 0.1 mg tablet (Catapres), Furosemide 20 mg tablet (Lasix), Meclizine 25 mg tablet,chewable (Antivert) (1/24/2025  2:33 PM)  Is the patient taking all medications as directed (includes completed medication regime)?: Yes (1/24/2025  2:33 PM)  Care Management Interventions: Provided patient education (1/24/2025  2:33 PM)  Medication Comments: Delivered via meds to beds prior to patients discharge (1/24/2025  2:33 PM)    Appointments  Does the patient have a primary care provider?: Yes (1/24/2025  2:33 PM)  Care Management Interventions: Educated patient on importance of making  "appointment; Advised patient to make appointment (1/24/2025  2:33 PM)  Has the patient kept scheduled appointments due by today?: Not applicable (1/24/2025  2:33 PM)  Care Management Interventions: Advised to reschedule appointment (1/24/2025  2:33 PM)    Self Management  What is the home health agency?: [facility] (1/24/2025  2:33 PM)  Has home health visited the patient within 72 hours of discharge?: Call prior to 72 hours (1/24/2025  2:33 PM)  What Durable Medical Equipment (DME) was ordered?: n/a (1/24/2025  2:33 PM)    Patient Teaching  Does the patient have access to their discharge instructions?: Yes (1/24/2025  2:33 PM)  Care Management Interventions: Reviewed instructions with patient (1/24/2025  2:33 PM)  What is the patient's perception of their health status since discharge?: Improving (1/24/2025  2:33 PM)  Is the patient/caregiver able to teach back the hierarchy of who to call/visit for symptoms/problems? PCP, Specialist, Home Health nurse, Urgent Care, ED, 911: Yes (1/24/2025  2:33 PM)  Patient/Caregiver Education Comments: Spoke with the patient who states she is home and doing \" okay\" today. Patient denied any acute changes or concerns in her condition since leaving the hospital. Patient denied any questions regarding activity/discharge instructions, medication changes or her hospital stay. D/C instructions discussed: watch your sodium intake and limit it to 2-3grams every 24hrs, and limit your free water intake to 2 Liters in a day. Repeat labs ordered: CBC Expected by: Feb 04, 2025 (Approximate) Please fax results to Dr. Sparks: #[phone] Renal function panel Expected by: Feb 04, 2025 (Approximate) Please fax results to Dr. Sparks: #[phone]. (1/24/2025  2:33 PM)    Wrap Up  Wrap Up Additional Comments: TCM initial outreach post discharge completed successfully. Patient confirmed she received  her discharge summary and has all medications needed in the home. Patient denied need for " DME or assistance obtaining transportation. [facility] has been ordered for new heart failure diagnosis and wound care. TCM phone number was provided to the patient, with the patient encouraged to call back with any non-emergent questions or concerns. Patient verbalized her understanding and states she has no questions or concerns at this time, but will call back if needed. Patient has a PCP follow up appt scheduled, but it is outside the 14 day window allowed for TCM. Patient declined to move up appt due to multiple other conflicting appts. (1/24/2025  2:33 PM)  Call End Time: 1430 (1/24/2025  2:33 PM)

## 2025-01-28 ENCOUNTER — PATIENT OUTREACH (OUTPATIENT)
Dept: CASE MANAGEMENT | Facility: HOSPITAL | Age: 60
End: 2025-01-28
Payer: COMMERCIAL

## 2025-01-28 NOTE — PROGRESS NOTES
"Heart Failure Nurse Navigator Transition of Care Phone Call    The role of the HF nurse navigator is to (1) characterize risk profiles of patients with heart failure transitioning from [id number] after hospitalization, (2) recommend interventions to improve care and reduce risks of worse post-hospitalization outcomes.     Assessment  Call attempted to patient .   Attempted call x2. Recording states phone cannot accept calls at this time and the mailbox is full.  Will attempt another call at later date.    Key \"Tonio\" Otto LUXN, RN  Heart Failure Clinical Nurse Navigator  [phone]                               "

## 2025-01-31 PROCEDURE — RXMED WILLOW AMBULATORY MEDICATION CHARGE

## 2025-02-03 ENCOUNTER — PHARMACY VISIT (OUTPATIENT)
Dept: PHARMACY | Facility: CLINIC | Age: 60
End: 2025-02-03
Payer: MEDICARE

## 2025-02-04 ENCOUNTER — APPOINTMENT (OUTPATIENT)
Dept: CARDIOLOGY | Facility: CLINIC | Age: 60
End: 2025-02-04
Payer: COMMERCIAL

## 2025-02-04 ENCOUNTER — PATIENT OUTREACH (OUTPATIENT)
Dept: CASE MANAGEMENT | Facility: HOSPITAL | Age: 60
End: 2025-02-04

## 2025-02-04 VITALS
DIASTOLIC BLOOD PRESSURE: 90 MMHG | HEART RATE: 71 BPM | WEIGHT: 238.6 LBS | BODY MASS INDEX: 43.91 KG/M2 | HEIGHT: 62 IN | SYSTOLIC BLOOD PRESSURE: 179 MMHG | TEMPERATURE: 97.2 F

## 2025-02-04 DIAGNOSIS — I50.32 CHRONIC HEART FAILURE WITH PRESERVED EJECTION FRACTION: Primary | ICD-10-CM

## 2025-02-04 DIAGNOSIS — N18.4 HYPERTENSIVE HEART AND KIDNEY DISEASE WITH HF AND WITH CKD STAGE IV: ICD-10-CM

## 2025-02-04 DIAGNOSIS — I13.0 HYPERTENSIVE HEART AND KIDNEY DISEASE WITH HF AND WITH CKD STAGE IV: ICD-10-CM

## 2025-02-04 DIAGNOSIS — I11.0 BENIGN HYPERTENSIVE HEART DISEASE WITH HEART FAILURE: ICD-10-CM

## 2025-02-04 DIAGNOSIS — N18.5 CKD (CHRONIC KIDNEY DISEASE) STAGE 5, GFR LESS THAN 15 ML/MIN (MULTI): ICD-10-CM

## 2025-02-04 PROCEDURE — 3060F POS MICROALBUMINURIA REV: CPT | Performed by: INTERNAL MEDICINE

## 2025-02-04 PROCEDURE — 3008F BODY MASS INDEX DOCD: CPT | Performed by: INTERNAL MEDICINE

## 2025-02-04 PROCEDURE — 3080F DIAST BP >= 90 MM HG: CPT | Performed by: INTERNAL MEDICINE

## 2025-02-04 PROCEDURE — 3077F SYST BP >= 140 MM HG: CPT | Performed by: INTERNAL MEDICINE

## 2025-02-04 PROCEDURE — 3052F HG A1C>EQUAL 8.0%<EQUAL 9.0%: CPT | Performed by: INTERNAL MEDICINE

## 2025-02-04 PROCEDURE — 99215 OFFICE O/P EST HI 40 MIN: CPT | Performed by: INTERNAL MEDICINE

## 2025-02-04 RX ORDER — HYDRALAZINE HYDROCHLORIDE 50 MG/1
25 TABLET, FILM COATED ORAL 3 TIMES DAILY
Qty: 135 TABLET | Refills: 3 | Status: SHIPPED | OUTPATIENT
Start: 2025-02-04 | End: 2026-02-04

## 2025-02-04 NOTE — PROGRESS NOTES
"Heart Failure Nurse Navigator Transition of Care Phone Call    The role of the HF nurse navigator is to (1) characterize risk profiles of patients with heart failure transitioning from [id number] after hospitalization, (2) recommend interventions to improve care and reduce risks of worse post-hospitalization outcomes.     Assessment  Call attempted to patient . Spoke with patient and obtained the following information.    HF Symptoms  Chest pain? No  Shortness of breath? none  Orthopnea? No  Paroxysmal nocturnal dyspnea? No  Edema? Yes   Weight gain >2lbs in 3 days or 5lbs in 1 week? No    Medications  Is the patient prescribed the following medications?  ARNi/ACEi/ARB: None  BB: Carvedilol 12.5 mg BID  MRA: None  SGLT2i: None  Diuretic: Torsemide 40 mg daily as needed for swelling or weight gain  Is the patient adherent to prescribed medications? YES    Management    Is the patient obtaining daily weights? YES  If yes, current weight? 222lbs this morning  Is the patient following diet limitations (2-3g Na, fluid restriction)? YES  Does the patient have a  cardiology follow-up scheduled? YES  If yes, appointment details? Dr. Sparks Today at 3pm  Does the patient require HF education reinforcement? No      Recommendations/Comments:  - Patient reports feeling well since discharge.   - Reports some SOB that is stable.  - GDMT limited d/t kidney function    No further needs at this time.    Key \"Tonio\" Otto LUXN, RN  Heart Failure Clinical Nurse Navigator  [phone]   "

## 2025-02-04 NOTE — PATIENT INSTRUCTIONS
Thank you for coming to see us today! To reach Dr. Sparks's office please call [phone]. Fax [phone]. Call [phone] to schedule an appointment. You may also contact the HF RNs at [email or web address]  (Please include your name and date of birth)  For MEDICATION REFILLS, please call [phone] option 6 then option 1.    INCREASE Hydralazine to 50mg three times a day.  Schedule an appointment with Dr. Brown.   Measure your blood pressure two times daily and record it. Bring this with you to your next appointment.  Schedule a follow up with Dr. Sparks in three months

## 2025-02-04 NOTE — PROGRESS NOTES
"    Heart Failure Cardiology    Nuris Squires is a 59 y.o. 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     She was hospitalized at [facility] between 1/2/2025 and 1/23/2025 for management of shortness of breath, uncontrolled hypertension, and evidence of acutely decompensated heart failure with pulmonary congestion and elevated BNP.  She had a prolonged hospital stay due to efforts to diurese her, and worsening renal failure.    She had to right heart catheterizations during her hospital course:  1/8/2025: RA 14, PA 58/14, PA mean 34, wedge 20, F CI 5.1  1/17/2025: /65, MAP 86, RA 8, PA 52/14, PA mean 29, wedge 14, Yung CI 9    Stress 10/2019  1. Normal myocardial perfusion study without evidence of ischemia or prior infarction.  2. The left ventricle is normal in size.  3. Normal LV wall motion with an LV EF estimated at greater than 65%.    Exam: /90 (BP Location: Right arm, Patient Position: Sitting, BP Cuff Size: Adult)   Pulse 71   Temp 36.2 °C (97.2 °F) (Temporal)   Ht 1.575 m (5' 2\")   Wt 108 kg (238 lb 9.6 oz)   BMI 43.64 kg/m²   Pleasant alert oriented  No JVD  Regular rate and rhythm  No murmurs  Obese  No ascites  No obvious leg edema    Current Outpatient Medications   Medication Instructions    acetaminophen (TYLENOL) 650 mg, oral, Every 4 hours PRN    albuterol 90 mcg/actuation inhaler 2 puffs, inhalation, Every 6 hours PRN    aspirin 81 mg, oral, Daily RT    atorvastatin (LIPITOR) 40 mg, oral, Daily    B complex-vitamin C-folic acid (Nephro-Duke) 0.8 mg tablet 1 tablet, oral, Daily    Basaglar KwikPen U-100 Insulin 15 Units, subcutaneous, Every morning, Take as directed per insulin instructions.    blood-glucose sensor (FreeStyle Remington 3 Sensor) device Use to monitor blood glucose. Change sensor every 14 days.    carvedilol (COREG) 12.5 mg, oral, 2 times daily    FeroSuL 325 mg, oral, Daily with " "breakfast    folic acid (FOLVITE) 1 mg, oral, Daily    FreeStyle Remington 3 [place] misc Use as instructed to monitor blood glucose.    gabapentin (NEURONTIN) 300 mg, oral, 2 times daily    insulin lispro (Admelog SoloStar U-100 Insulin) 100 unit/mL injection Inject per sliding scale instructions under the skin 3 times a day with meals. (Max daily dose 70 units)    isosorbide mononitrate ER (IMDUR) 30 mg, oral, Daily, Do not crush or chew.    Mounjaro 2.5 mg, subcutaneous, Every 7 days    OneTouch Delica Plus Lancet 30 gauge misc USE TO TEST BLOOD SUGAR AS DIRECTED    OneTouch Ultra Test strip Use 1 strip BID to check glucose    OneTouch Ultra2 Meter misc use 1 to 2 times daily    oxybutynin (DITROPAN) 5 mg, oral, 3 times daily    pen needle, diabetic 32 gauge x 5/32\" needle Use four times a day with insulin use    torsemide (DEMADEX) 40 mg, oral, Daily PRN     Past medical history (non-cardiac):  -- DM2 insulin-dependent c/neuropathy and nephropathy  -- CKD G5 (eGFR 12); A3  -- Hypertension, poorly controlled  -- Prior amputation of toe  -- PAD  -- HL  -- Obesity (BMI 43.6)  -- Anemia    Cardiovascular history:  -- HFpEF, Stage C    Assessment: 59-year-old -American female who is an insulin-dependent diabetic and appears to have CKD G5 A3, with recent prolonged hospitalization for management of newly diagnosed heart failure with preserved ejection fraction.  She is now back home and appears to be euvolemic on my exam today.  Ability to further diagnose etiology of heart failure is limited by her kidney failure (for example we will not be pursuing a coronary angiogram at this time), but I suspect the HFpEF may be due to hypertension.    Plan:  -- Improved control of hypertension: Increase dose of hydralazine to 50 mg 3 times a day  -- Refer to nephrology for consultation (high risk for progression to ESRD/HD)      Marylin Sparks MD, MPH  Advanced Heart Failure and Transplant Cardiology ([facility])  [facility] [facility] [place]  [place], [place]

## 2025-02-04 NOTE — PROGRESS NOTES
Recent Hospitalizations: 1/2/25-1/23/25 ADHF  Accompanied by:    Diet: None  Exercise: None    Denies fatigue, chest pain, chest pressure, palpitations, shortness of breath, dyspnea on exertion, orthopnea, PND.  Denies headaches, dizziness, lightheadedness, and falls.    She still has mild edema in her legs. She has Furosemide 40mg PRN; she took a dose this morning.

## 2025-02-05 ENCOUNTER — HOSPITAL ENCOUNTER (OUTPATIENT)
Dept: RADIOLOGY | Facility: HOSPITAL | Age: 60
Discharge: HOME | End: 2025-02-05
Payer: COMMERCIAL

## 2025-02-05 ENCOUNTER — APPOINTMENT (OUTPATIENT)
Dept: ORTHOPEDIC SURGERY | Facility: HOSPITAL | Age: 60
End: 2025-02-05
Payer: COMMERCIAL

## 2025-02-05 DIAGNOSIS — S72.402D CLOSED FRACTURE OF DISTAL END OF LEFT FEMUR WITH ROUTINE HEALING, UNSPECIFIED FRACTURE MORPHOLOGY, SUBSEQUENT ENCOUNTER: ICD-10-CM

## 2025-02-05 DIAGNOSIS — S72.452K CLOSED COMMINUTED SUPRACONDYLAR FRACTURE OF LEFT FEMUR WITH NONUNION: ICD-10-CM

## 2025-02-05 DIAGNOSIS — M81.6 LOCALIZED OSTEOPOROSIS (LEQUESNE): ICD-10-CM

## 2025-02-05 DIAGNOSIS — M89.9 DISORDER OF BONE, UNSPECIFIED: ICD-10-CM

## 2025-02-05 DIAGNOSIS — S72.402D CLOSED FRACTURE OF DISTAL END OF LEFT FEMUR WITH ROUTINE HEALING, UNSPECIFIED FRACTURE MORPHOLOGY, SUBSEQUENT ENCOUNTER: Primary | ICD-10-CM

## 2025-02-05 PROCEDURE — 99214 OFFICE O/P EST MOD 30 MIN: CPT | Performed by: ORTHOPAEDIC SURGERY

## 2025-02-05 PROCEDURE — 1036F TOBACCO NON-USER: CPT | Performed by: ORTHOPAEDIC SURGERY

## 2025-02-05 PROCEDURE — 73560 X-RAY EXAM OF KNEE 1 OR 2: CPT | Mod: LT

## 2025-02-05 PROCEDURE — 3060F POS MICROALBUMINURIA REV: CPT | Performed by: ORTHOPAEDIC SURGERY

## 2025-02-05 PROCEDURE — 3052F HG A1C>EQUAL 8.0%<EQUAL 9.0%: CPT | Performed by: ORTHOPAEDIC SURGERY

## 2025-02-05 PROCEDURE — 73552 X-RAY EXAM OF FEMUR 2/>: CPT | Mod: LT

## 2025-02-05 NOTE — PROGRESS NOTES
Nuris Squires is  post-op from intramedullary nail and ORIF left distal femur fracture on 4/16/2024.  she is doing well at this point.  Pain is well controlled  Denies fevers or chills.  Denies drainage from the wound.  she reports no additional symptoms or concerns. No shortness of breath or chest pain. No calf swelling or pain.    The patients full medical history, surgical history, medications, allergies, family, medical history, social history, and a complete 14 point review of systems is documented in the medical record on the signed, scanned medical intake sheet or reviewed in the history of present illness. Review of systems otherwise negative    Past Medical History:   Diagnosis Date    Personal history of other diseases of the circulatory system     History of hypertension    Personal history of other endocrine, nutritional and metabolic disease     History of hyperlipidemia    Personal history of other endocrine, nutritional and metabolic disease     History of diabetes mellitus       Medication Documentation Review Audit       Reviewed by Eulogio Romero MA (Medical Assistant) on 02/05/25 at 0920      Medication Order Taking? Sig Documenting Provider Last Dose Status   acetaminophen (Tylenol) 325 mg tablet [id number] Yes Take 2 tablets (650 mg) by mouth every 4 hours if needed for mild pain (1 - 3). SEDA Finney  Active   albuterol 90 mcg/actuation inhaler [id number] Yes Inhale 2 puffs every 6 hours if needed for wheezing. Mone Aquino MD  Active   aspirin 81 mg EC tablet [id number] Yes Take 1 tablet (81 mg) by mouth once daily. SEDA Finney  Active   atorvastatin (Lipitor) 40 mg tablet [id number] Yes Take 1 tablet (40 mg) by mouth once daily. SEDA Finney  Active   B complex-vitamin C-folic acid (Nephro-Duke) 0.8 mg tablet [id number] Yes Take 1 tablet by mouth once daily. SEDA Finney  Active   blood-glucose sensor (FreeStyle Remington 3 Sensor) device  [id number] Yes Use to monitor blood glucose. Change sensor every 14 days. Mone Aquino MD  Active   carvedilol (Coreg) 12.5 mg tablet [id number] Yes Take 1 tablet (12.5 mg) by mouth 2 times a day. SEDA Finney  Active   ferrous sulfate, 325 mg ferrous sulfate, tablet [id number] Yes Take 1 tablet (325 mg) by mouth once daily with breakfast. SEDA Finney  Active   folic acid (Folvite) 1 mg tablet [id number] Yes Take 1 tablet (1 mg) by mouth once daily. SEDA Finney  Active   FreeStyle Remington 3 [place] misc [id number] Yes Use as instructed to monitor blood glucose. Mone Aquino MD  Active   gabapentin (Neurontin) 300 mg capsule [id number] Yes Take 1 capsule (300 mg) by mouth 2 times a day. Mone Aquino MD  Active     Discontinued 02/04/25 1536   hydrALAZINE (Apresoline) 50 mg tablet [id number] Yes Take 0.5 tablets (25 mg) by mouth 3 times a day. Marylin Sparks MD [facility]  Active   insulin glargine (Basaglar KwikPen U-100 Insulin) 100 unit/mL (3 mL) pen [id number] Yes Inject 15 Units under the skin once daily in the morning. Take as directed per insulin instructions. SEDA Finney  Active   insulin lispro (Admelog SoloStar U-100 Insulin) 100 unit/mL injection [id number] Yes Inject per sliding scale instructions under the skin 3 times a day with meals. (Max daily dose 70 units) Mone Aquino MD  Active   isosorbide mononitrate ER (Imdur) 30 mg 24 hr tablet [id number] Yes Take 1 tablet (30 mg) by mouth once daily. Do not crush or chew. SEDA Finney  Active   OneTouch Delica Plus Lancet 30 gauge misc [id number] Yes USE TO TEST BLOOD SUGAR AS DIRECTED Mone Aquino MD  Active   OneTouch Ultra Test strip [id number] Yes Use 1 strip BID to check glucose Mone Aquino MD  Active   OneTouch Ultra2 Meter misc [id number] Yes use 1 to 2 times daily Mone Aquino MD  Active   oxybutynin (Ditropan) 5 mg tablet [id number] Yes Take 1 tablet (5 mg) by mouth 3 times a day. Betty  "E Mario, APRN-CNP  Active   pen needle, diabetic 32 gauge x 5/32\" needle [id number] Yes Use four times a day with insulin use Mone Aquino MD  Active   tirzepatide (Mounjaro) 2.5 mg/0.5 mL pen injector [id number] Yes Inject 2.5 mg under the skin every 7 days. Mone Aquino MD  Active   torsemide (Demadex) 20 mg tablet [id number] Yes Take 2 tablets (40 mg) by mouth once daily as needed (Please take one tab daily if you experience any of the following: increased shortness of breath, increased swelling of your lower extremities, or sudden weight gain of 3lbs or more in a few days, or 5lbs or more in a week). SEDA Finney  Active                    No Known Allergies    Social History     Socioeconomic History    Marital status: Single     Spouse name: Not on file    Number of children: Not on file    Years of education: Not on file    Highest education level: Not on file   Occupational History    Not on file   Tobacco Use    Smoking status: Never     Passive exposure: Never    Smokeless tobacco: Never   Vaping Use    Vaping status: Never Used   Substance and Sexual Activity    Alcohol use: Yes    Drug use: Never    Sexual activity: Defer   Other Topics Concern    Not on file   Social History Narrative    Not on file     Social Drivers of Health     Financial Resource Strain: Low Risk  (1/13/2025)    Overall Financial Resource Strain (CARDIA)     Difficulty of Paying Living Expenses: Not hard at all   Food Insecurity: No Food Insecurity (1/2/2025)    Hunger Vital Sign     Worried About Running Out of Food in the Last Year: Never true     Ran Out of Food in the Last Year: Never true   Transportation Needs: No Transportation Needs (1/13/2025)    PRAPARE - Transportation     Lack of Transportation (Medical): No     Lack of Transportation (Non-Medical): No   Physical Activity: Insufficiently Active (1/2/2025)    Exercise Vital Sign     Days of Exercise per Week: 2 days     Minutes of Exercise per Session: 20 " min   Stress: No Stress Concern Present (1/25/2024)    [place] [employer] - Occupational Stress Questionnaire     Feeling of Stress : Only a little   Social Connections: Feeling Socially Integrated (6/26/2024)    OASIS : Social Isolation     Frequency of experiencing loneliness or isolation: Never   Intimate Partner Violence: Not At Risk (1/2/2025)    Humiliation, Afraid, Rape, and Kick questionnaire     Fear of Current or Ex-Partner: No     Emotionally Abused: No     Physically Abused: No     Sexually Abused: No   Housing Stability: Low Risk  (1/13/2025)    Housing Stability Vital Sign     Unable to Pay for Housing in the Last Year: No     Number of Times Moved in the Last Year: 1     Homeless in the Last Year: No       Past Surgical History:   Procedure Laterality Date    CARDIAC CATHETERIZATION N/A 1/8/2025    Procedure: Right Heart Cath;  Surgeon: Migel Stanton MD;  Location: [place] [address] Cardiac Cath Lab;  Service: Cardiovascular;  Laterality: N/A;    CARDIAC CATHETERIZATION N/A 1/17/2025    Procedure: Right Heart Cath;  Surgeon: Ana Lea MD;  Location: [place] [address] Cardiac Cath Lab;  Service: Cardiovascular;  Laterality: N/A;    CT ANGIO NECK  1/25/2023    CT NECK ANGIO W AND WO IV CONTRAST 1/25/2023 DOCTOR OFFICE LEGACY    CT HEAD ANGIO W AND WO IV CONTRAST  1/25/2023    CT HEAD ANGIO W AND WO IV CONTRAST 1/25/2023 DOCTOR OFFICE LEGACY    OTHER SURGICAL HISTORY  10/21/2020    Toe amputation       Gen: The patient is alert and oriented ×3, is in no acute distress, and appear their stated age and weight.    Psychiatric: Mood and affect are appropriate.    Eyes: Sclera are white, and pupils are round and symmetric.    ENT: Mucous membranes are moist.     Neck: Supple. Thyroid is midline.    Respiratory: Respirations are nonlabored, chest rise is symmetric.    Cardiac: Rate is regular by palpation of distal pulses.     Abdomen: Nondistended.    Integument: No obvious  cutaneous lesions are noted. No signs of lymphangitis. No signs of systemic edema.  side: left lower extremity :  her  surgical incisions are healing well, without evidence of erythema, fluctuance, drainage, or infection.  The skin around the incision is intact.  Distally neurovascular exam is stable.  There is appropriate tenderness to palpation in the selene-incisional area. No calf swelling or tenderness to palpation.      I personally reviewed multiple views of left knee and femur were obtained in the office today demonstrate maintenance of reduction, interval healing, and a stable position of the hardware.  Additionally she has a lot of callus on the lateral side but not as much on the medial side concerning for nonunion.      Nuris Squires is a 59 y.o. female patient status post intramedullary nail and ORIF left distal femur fracture on 4/16/2024.   I went over her x-rays in detail today.   she is WBAT of the side: left lower extremity. ~He/she~ is range of motion as tolerated of the side: left lower extremity.  I stressed the importance of physical therapy on overall functional outcome.  To work on range of motion.  Based on her x-rays I think that at 10 months she should have more healing on the medial side I like to get a CT scan of her femur as well as get nonunion labs.  I answered all patient's questions he agrees with treatment plan.  I will see her back in Follow-up 3 weeks to discuss her CT results        Barrera Rodriguez    Department of Orthopaedic Trauma Surgery

## 2025-02-06 ENCOUNTER — APPOINTMENT (OUTPATIENT)
Dept: UROLOGY | Facility: CLINIC | Age: 60
End: 2025-02-06
Payer: COMMERCIAL

## 2025-02-07 LAB
25(OH)D3+25(OH)D2 SERPL-MCNC: 37 NG/ML (ref 30–100)
ALBUMIN SERPL-MCNC: 3.6 G/DL (ref 3.6–5.1)
ALP SERPL-CCNC: 68 U/L (ref 37–153)
ALT SERPL-CCNC: 14 U/L (ref 6–29)
ANION GAP SERPL CALCULATED.4IONS-SCNC: 11 MMOL/L (CALC) (ref 7–17)
AST SERPL-CCNC: 13 U/L (ref 10–35)
BASOPHILS # BLD AUTO: 59 CELLS/UL (ref 0–200)
BASOPHILS NFR BLD AUTO: 0.9 %
BILIRUB SERPL-MCNC: 0.3 MG/DL (ref 0.2–1.2)
BUN SERPL-MCNC: 40 MG/DL (ref 7–25)
CALCIUM SERPL-MCNC: 8.9 MG/DL (ref 8.6–10.4)
CHLORIDE SERPL-SCNC: 111 MMOL/L (ref 98–110)
CO2 SERPL-SCNC: 20 MMOL/L (ref 20–32)
CORTIS F SERPL-MCNC: NORMAL UG/DL
CREAT SERPL-MCNC: 2.44 MG/DL (ref 0.5–1.05)
CRP SERPL-MCNC: 6.7 MG/L
EGFRCR SERPLBLD CKD-EPI 2021: 22 ML/MIN/1.73M2
EOSINOPHIL # BLD AUTO: 198 CELLS/UL (ref 15–500)
EOSINOPHIL NFR BLD AUTO: 3 %
ERYTHROCYTE [DISTWIDTH] IN BLOOD BY AUTOMATED COUNT: 14.3 % (ref 11–15)
ERYTHROCYTE [SEDIMENTATION RATE] IN BLOOD BY WESTERGREN METHOD: 36 MM/H
GLUCOSE SERPL-MCNC: 96 MG/DL (ref 65–99)
HCT VFR BLD AUTO: 32.2 % (ref 35–45)
HGB BLD-MCNC: 10.2 G/DL (ref 11.7–15.5)
LYMPHOCYTES # BLD AUTO: 1828 CELLS/UL (ref 850–3900)
LYMPHOCYTES NFR BLD AUTO: 27.7 %
MCH RBC QN AUTO: 30.3 PG (ref 27–33)
MCHC RBC AUTO-ENTMCNC: 31.7 G/DL (ref 32–36)
MCV RBC AUTO: 95.5 FL (ref 80–100)
MONOCYTES # BLD AUTO: 383 CELLS/UL (ref 200–950)
MONOCYTES NFR BLD AUTO: 5.8 %
NEUTROPHILS # BLD AUTO: 4132 CELLS/UL (ref 1500–7800)
NEUTROPHILS NFR BLD AUTO: 62.6 %
PLATELET # BLD AUTO: 296 THOUSAND/UL (ref 140–400)
PMV BLD REES-ECKER: 9.6 FL (ref 7.5–12.5)
POTASSIUM SERPL-SCNC: 4.5 MMOL/L (ref 3.5–5.3)
PROT SERPL-MCNC: 6.7 G/DL (ref 6.1–8.1)
PTH-INTACT SERPL-MCNC: 154 PG/ML (ref 16–77)
RBC # BLD AUTO: 3.37 MILLION/UL (ref 3.8–5.1)
SODIUM SERPL-SCNC: 142 MMOL/L (ref 135–146)
T4 FREE SERPL-MCNC: 1.3 NG/DL (ref 0.8–1.8)
TSH SERPL-ACNC: 0.79 MIU/L (ref 0.4–4.5)
WBC # BLD AUTO: 6.6 THOUSAND/UL (ref 3.8–10.8)

## 2025-02-11 ENCOUNTER — HOSPITAL ENCOUNTER (OUTPATIENT)
Dept: RADIOLOGY | Facility: HOSPITAL | Age: 60
Discharge: HOME | End: 2025-02-11
Payer: COMMERCIAL

## 2025-02-11 DIAGNOSIS — S72.402D CLOSED FRACTURE OF DISTAL END OF LEFT FEMUR WITH ROUTINE HEALING, UNSPECIFIED FRACTURE MORPHOLOGY, SUBSEQUENT ENCOUNTER: ICD-10-CM

## 2025-02-11 LAB

## 2025-02-11 PROCEDURE — 73700 CT LOWER EXTREMITY W/O DYE: CPT | Mod: LT

## 2025-02-13 ENCOUNTER — APPOINTMENT (OUTPATIENT)
Dept: ORTHOPEDIC SURGERY | Facility: HOSPITAL | Age: 60
End: 2025-02-13
Payer: COMMERCIAL

## 2025-02-13 NOTE — PROGRESS NOTES
Clinical Pharmacy Appointment    Patient ID: Nuris Squires is a 59 y.o. female who presents for Type 2 Diabetes Mellitus.    Pt is here for Follow Up appointment.   Referring Provider: Mone Aquino MD  PCP: Mone Aquino MD, last visit: 11/19/24, next visit: 2/20/25    Subjective   HPI  PMH significant for T2DM, obesity, HLD, HTN, PAD, PVD, CKD, HF.  Special needs/barriers to therapy: None identified    Medication System Management  Patients preferred pharmacy: [facility] home delivery ([facility])  Adherence/Organization: No current concerns  Affordability/Accessibility: No current concerns,  PAP approved for [facility] through 12/4/25    Drug Interactions  No relevant drug interactions were noted.    DIABETES MELLITUS Type 2:    Known diabetic complications: peripheral neuropathy, peripheral vascular disease, chronic kidney disease, and obesity.  Hx or FH Hx of MTC/MEN2?: No   Pancreatitis?: No   Gastroparesis?: No  UTI/Yeast Infections?: Yes, history of yeast infections    Follows with Endocrinology?: No  Diabetic Eye Exam: Up to date, completed 11/2024 , has appointment scheduled for 3/2025  Monofilament Foot Exam: Following with podiatrist, Dr. Pizano     Current diabetic medications include:  Mounjaro 2.5mg once weekly (Mondays)  Insuline Glargine (Basaglar) 15 units once daily  Insulin Lispro (Admelog) SS TID before meals (1-3 times per day)   Blood Glucose Admelog Units   < 80 0   81 - 150 10   151 - 200 12   201 - 250 14   252 - 300 16   301 - 350 18   351 - 400 20   > 400 22 + call provider   Previous medications: Metformin, Jardiance (VENKAT)    Clarifications to above regimen: none  Adverse Effects: none    Glucose Readings:  Glucometer/CGM Type: FreeStyle Remington 3    Previous home BG readings: (12/31/24) -200  Current home BG readings:   Review History --> Average Glucose (last option) --> Use arrows on bottom to change # of days  Review History --> Scroll to next  page --> Time in Target (2nd option) Use arrows on bottom to change # of days  Average Glucose   7-day 14-day 30-day   Average 123 123 136   Overnight (12am - 6am) 116 115 133   Morning (6am - 12pm) 117 116 122   Afternoon (12pm - 6pm) 120 126 140   Evening (6pm-12am) 140 135 150     Any episodes of hypoglycemia? Yes - one BG of 69   Did patient treat episode of hypoglycemia appropriately? Yes, drank orange juice  Does the patient have a prescription for ready-to-use Glucagon? No, has glucose tablets    Risk Reducing Medications:  Statin? Yes, Atorvastatin 40mg daily  ACE-I/ARB? No    Preventative Care  Immunizations Needed: RSV, Prevnar, Shingrix, and Tdap  Tobacco Use: non-smoker    Objective   No Known Allergies  Social History     Social History Narrative    Not on file      Medication Review  Current Outpatient Medications   Medication Instructions    acetaminophen (TYLENOL) 650 mg, oral, Every 4 hours PRN    albuterol 90 mcg/actuation inhaler 2 puffs, inhalation, Every 6 hours PRN    aspirin 81 mg, oral, Daily RT    atorvastatin (LIPITOR) 40 mg, oral, Daily    B complex-vitamin C-folic acid (Nephro-Duke) 0.8 mg tablet 1 tablet, oral, Daily    Basaglar KwikPen U-100 Insulin 15 Units, subcutaneous, Every morning, Take as directed per insulin instructions.    blood-glucose sensor (FreeStyle Remington 3 Sensor) device Use to monitor blood glucose. Change sensor every 14 days.    carvedilol (COREG) 12.5 mg, oral, 2 times daily    FeroSuL 325 mg, oral, Daily with breakfast    folic acid (FOLVITE) 1 mg, oral, Daily    FreeStyle Remington 3 [place] misc Use as instructed to monitor blood glucose.    gabapentin (NEURONTIN) 300 mg, oral, 2 times daily    hydrALAZINE (APRESOLINE) 25 mg, oral, 3 times daily    insulin lispro (Admelog SoloStar U-100 Insulin) 100 unit/mL injection Inject per sliding scale instructions under the skin 3 times a day with meals. (Max daily dose 70 units)    isosorbide mononitrate ER (IMDUR) 30 mg, oral,  "Daily, Do not crush or chew.    Mounjaro 2.5 mg, subcutaneous, Every 7 days    OneTouch Delica Plus Lancet 30 gauge misc USE TO TEST BLOOD SUGAR AS DIRECTED    OneTouch Ultra Test strip Use 1 strip BID to check glucose    OneTouch Ultra2 Meter misc use 1 to 2 times daily    oxybutynin (DITROPAN) 5 mg, oral, 3 times daily    pen needle, diabetic 32 gauge x 5/32\" needle Use four times a day with insulin use    torsemide (DEMADEX) 40 mg, oral, Daily PRN      Vitals  BP Readings from Last 2 Encounters:   02/04/25 179/90   01/23/25 147/77     Labs  A1C  Lab Results   Component Value Date    HGBA1C 8.7 (H) 01/02/2025    HGBA1C 9 (A) 11/19/2024    HGBA1C 11.2 (A) 07/19/2024     BMP  Lab Results   Component Value Date    CALCIUM 8.9 02/05/2025     02/05/2025    K 4.5 02/05/2025    CO2 20 02/05/2025     (H) 02/05/2025    BUN 40 (H) 02/05/2025    CREATININE 2.44 (H) 02/05/2025    EGFR 22 (L) 02/05/2025     LFTs  Lab Results   Component Value Date    ALT 14 02/05/2025    AST 13 02/05/2025    ALKPHOS 68 02/05/2025    BILITOT 0.3 02/05/2025     FLP  Lab Results   Component Value Date    TRIG 189 (H) 05/08/2023    CHOL 174 05/08/2023    LDLF 98 05/08/2023    HDL 38.5 (A) 05/08/2023     Urine Microalbumin  Lab Results   Component Value Date    MICROALBCREA  11/19/2024      Comment:      One or more analytes used in this calculation is outside of the analytical measurement range. Calculation cannot be performed.     Weight Management  Wt Readings from Last 3 Encounters:   02/04/25 108 kg (238 lb 9.6 oz)   01/23/25 104 kg (230 lb 2.6 oz)   11/19/24 99.8 kg (220 lb)      There is no height or weight on file to calculate BMI.     Assessment/Plan     DIABETES MELLITUS:   Patient's A1c is 8.7% on 1/2/25. Goal A1c <7%.  Patient's SMBGs are well controlled, 14 day avg .     Rationale for plan: Patient's BG are well controlled. Basaglar dose adjusted to 15 units daily at hospital discharge 1/23/25, BG have been controlled " since. Due to BG well controlled, plan to continue current medication regimen at this time. Follow up in 1 month.     Medication Changes:  No Medication Changes     Patient Education:  Counseled patient on relevant medication mechanisms of action, expectations, side effects, duration of therapy, contraindications, administration, and monitoring parameters.  All questions and concerns addressed. Contact pharmacist with any further questions or concerns prior to next appointment.  Reviewed CGM BG goals: Target range , Time in Range Goal >70%, GMI goal <7%    Clinical Pharmacist follow-up: 3/21/25 11:20AM, Telehealth visit    Thank You,  Ritu Dubon, PharmD  PGY-1 Pharmacy Resident    Continue all meds under the continuation of care with the referring provider and clinical pharmacy team.  Verbal consent to manage patient's drug therapy was obtained from the patient. They were informed they may decline to participate or withdraw from participation in pharmacy services at any time.

## 2025-02-14 ENCOUNTER — TELEMEDICINE (OUTPATIENT)
Dept: PHARMACY | Facility: HOSPITAL | Age: 60
End: 2025-02-14
Payer: COMMERCIAL

## 2025-02-14 DIAGNOSIS — Z79.4 TYPE 2 DIABETES MELLITUS WITH RIGHT EYE AFFECTED BY PROLIFERATIVE RETINOPATHY AND MACULAR EDEMA, WITH LONG-TERM CURRENT USE OF INSULIN: ICD-10-CM

## 2025-02-14 DIAGNOSIS — E11.3511 TYPE 2 DIABETES MELLITUS WITH RIGHT EYE AFFECTED BY PROLIFERATIVE RETINOPATHY AND MACULAR EDEMA, WITH LONG-TERM CURRENT USE OF INSULIN: ICD-10-CM

## 2025-02-14 RX ORDER — TIRZEPATIDE 2.5 MG/.5ML
2.5 INJECTION, SOLUTION SUBCUTANEOUS
Qty: 2 ML | Refills: 3 | Status: SHIPPED | OUTPATIENT
Start: 2025-02-14

## 2025-02-17 ENCOUNTER — APPOINTMENT (OUTPATIENT)
Dept: NEPHROLOGY | Facility: CLINIC | Age: 60
End: 2025-02-17
Payer: COMMERCIAL

## 2025-02-17 VITALS
HEART RATE: 71 BPM | BODY MASS INDEX: 43.79 KG/M2 | DIASTOLIC BLOOD PRESSURE: 95 MMHG | SYSTOLIC BLOOD PRESSURE: 195 MMHG | HEIGHT: 62 IN | WEIGHT: 238 LBS

## 2025-02-17 DIAGNOSIS — N18.4 STAGE 4 CHRONIC KIDNEY DISEASE (MULTI): Primary | ICD-10-CM

## 2025-02-17 PROCEDURE — 1036F TOBACCO NON-USER: CPT | Performed by: INTERNAL MEDICINE

## 2025-02-17 PROCEDURE — 3080F DIAST BP >= 90 MM HG: CPT | Performed by: INTERNAL MEDICINE

## 2025-02-17 PROCEDURE — 99213 OFFICE O/P EST LOW 20 MIN: CPT | Performed by: INTERNAL MEDICINE

## 2025-02-17 PROCEDURE — 3077F SYST BP >= 140 MM HG: CPT | Performed by: INTERNAL MEDICINE

## 2025-02-17 PROCEDURE — 3060F POS MICROALBUMINURIA REV: CPT | Performed by: INTERNAL MEDICINE

## 2025-02-17 PROCEDURE — 3008F BODY MASS INDEX DOCD: CPT | Performed by: INTERNAL MEDICINE

## 2025-02-17 PROCEDURE — 3052F HG A1C>EQUAL 8.0%<EQUAL 9.0%: CPT | Performed by: INTERNAL MEDICINE

## 2025-02-17 ASSESSMENT — PAIN SCALES - GENERAL: PAINLEVEL_OUTOF10: 0-NO PAIN

## 2025-02-17 NOTE — PROGRESS NOTES
58 yo AAF  No urinary symptoms ,cath for urinary retention following with urology 2/26  No NSAID  No herbals    PMH  T2DM  HTN  CHF    Sochx  No tob  No ETOH    FMHx  ESRD sister    RoS OTW Neg 10/14 systems    NAD  Sclera AI s inj  MMM, no sores  Deferred secondary to COVID  No edema  No tremor  No rash  Appropriate    VENKAT, Stage 4 CKD    Labs and follow up in 2-3 months  [facility] referral  Clinical pharm referral  Not interested in nutrition referral at this [facility]

## 2025-02-20 ENCOUNTER — APPOINTMENT (OUTPATIENT)
Dept: PRIMARY CARE | Facility: CLINIC | Age: 60
End: 2025-02-20
Payer: COMMERCIAL

## 2025-02-20 VITALS
SYSTOLIC BLOOD PRESSURE: 148 MMHG | OXYGEN SATURATION: 94 % | BODY MASS INDEX: 41.77 KG/M2 | WEIGHT: 227 LBS | TEMPERATURE: 97.3 F | HEART RATE: 73 BPM | DIASTOLIC BLOOD PRESSURE: 80 MMHG | HEIGHT: 62 IN

## 2025-02-20 DIAGNOSIS — E11.3511 TYPE 2 DIABETES MELLITUS WITH RIGHT EYE AFFECTED BY PROLIFERATIVE RETINOPATHY AND MACULAR EDEMA, WITH LONG-TERM CURRENT USE OF INSULIN: Primary | ICD-10-CM

## 2025-02-20 DIAGNOSIS — Z79.4 TYPE 2 DIABETES MELLITUS WITH RIGHT EYE AFFECTED BY PROLIFERATIVE RETINOPATHY AND MACULAR EDEMA, WITH LONG-TERM CURRENT USE OF INSULIN: Primary | ICD-10-CM

## 2025-02-20 DIAGNOSIS — E11.43 DIABETIC AUTONOMIC NEUROPATHY ASSOCIATED WITH TYPE 2 DIABETES MELLITUS (MULTI): ICD-10-CM

## 2025-02-20 DIAGNOSIS — D64.9 ANEMIA, UNSPECIFIED TYPE: ICD-10-CM

## 2025-02-20 DIAGNOSIS — I10 HYPERTENSION, UNSPECIFIED TYPE: ICD-10-CM

## 2025-02-20 PROCEDURE — 3008F BODY MASS INDEX DOCD: CPT | Performed by: FAMILY MEDICINE

## 2025-02-20 PROCEDURE — 3079F DIAST BP 80-89 MM HG: CPT | Performed by: FAMILY MEDICINE

## 2025-02-20 PROCEDURE — 1036F TOBACCO NON-USER: CPT | Performed by: FAMILY MEDICINE

## 2025-02-20 PROCEDURE — 3052F HG A1C>EQUAL 8.0%<EQUAL 9.0%: CPT | Performed by: FAMILY MEDICINE

## 2025-02-20 PROCEDURE — 99214 OFFICE O/P EST MOD 30 MIN: CPT | Performed by: FAMILY MEDICINE

## 2025-02-20 PROCEDURE — 3077F SYST BP >= 140 MM HG: CPT | Performed by: FAMILY MEDICINE

## 2025-02-20 PROCEDURE — 3060F POS MICROALBUMINURIA REV: CPT | Performed by: FAMILY MEDICINE

## 2025-02-20 RX ORDER — GABAPENTIN 300 MG/1
300 CAPSULE ORAL 2 TIMES DAILY
Qty: 180 CAPSULE | Refills: 3 | Status: SHIPPED | OUTPATIENT
Start: 2025-02-20

## 2025-02-20 RX ORDER — ISOSORBIDE MONONITRATE 30 MG/1
30 TABLET, EXTENDED RELEASE ORAL DAILY
Qty: 30 TABLET | Refills: 3 | Status: SHIPPED | OUTPATIENT
Start: 2025-02-20 | End: 2025-06-20

## 2025-02-20 RX ORDER — FERROUS SULFATE 325(65) MG
65 TABLET ORAL
Qty: 30 TABLET | Refills: 1 | Status: SHIPPED | OUTPATIENT
Start: 2025-02-20 | End: 2025-04-21

## 2025-02-20 RX ORDER — ASPIRIN 81 MG/1
81 TABLET ORAL
Qty: 30 TABLET | Refills: 3 | Status: SHIPPED | OUTPATIENT
Start: 2025-02-20 | End: 2025-06-20

## 2025-02-20 ASSESSMENT — PAIN SCALES - GENERAL: PAINLEVEL_OUTOF10: 0-NO PAIN

## 2025-02-20 NOTE — PROGRESS NOTES
"Subjective   Patient ID: Nuris Squires is a 59 y.o. female who presents for Diabetes.  Patient here to follow up regarding diabetes.  Has been working with  pharmacy.  Started on Mounjaro 2.5.  It has affected appetite.  Only eating once a day.  Also taking basaglar 15 U daily and lispro SS TID 1-3 times a day.  (No longer on metformin or Jardiance due to VENKAT)  Has CGM to monitor glucose, and it has been useful.  Average 7-day glucose 123, 30-day 136.  Last A1C was 8.7 on 1/2/25.    Seeing nephrologist for GFR 22.  Right now watching.  Is on a renal vitamin.    Will see ortho for right knee.  CT scan last week.  May have to do another surgery.  Still has pain and unsteadiness.    No SOB.  No swelling.  No CP.      Diabetes  She presents for her follow-up diabetic visit. She has type 2 diabetes mellitus. Her disease course has been improving. Pertinent negatives for diabetes include no blurred vision, no chest pain, no foot paresthesias and no foot ulcerations. There are no hypoglycemic complications. Diabetic complications include nephropathy and PVD. She is compliant with treatment all of the time. An ACE inhibitor/angiotensin II receptor blocker is not being taken. She sees a podiatrist.Eye exam is current.       Review of Systems   Eyes:  Negative for blurred vision.   Cardiovascular:  Negative for chest pain.       Objective   /80 (BP Location: Right arm, Patient Position: Sitting, BP Cuff Size: Large adult)   Pulse 73   Temp 36.3 °C (97.3 °F) (Temporal)   Ht 1.575 m (5' 2\")   Wt 103 kg (227 lb)   SpO2 94%   BMI 41.52 kg/m²     Physical Exam  Vitals reviewed.   Constitutional:       Appearance: Normal appearance.   Cardiovascular:      Rate and Rhythm: Normal rate and regular rhythm.   Pulmonary:      Effort: Pulmonary effort is normal.      Breath sounds: Normal breath sounds.   Neurological:      Mental Status: She is alert.           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

## 2025-02-21 ASSESSMENT — ENCOUNTER SYMPTOMS: BLURRED VISION: 0

## 2025-02-21 NOTE — PATIENT INSTRUCTIONS
Diabetes with improving control since medication adjusted.  Last A1C 8.7 on 1/2/25.  Will be able to recheck after 4/3/25.  Currently taking Basaglar to 15 units and SSI 1-2 times a day.  Started on Mounjaro 2.5 mg.  Working with  pharmacy for medications.  Using CGM, which has been helpful.    For BP and heart failure, taking carvedilol 12.5 mg BID, hydralazine 25 mg TID, isosorbide ER 30 mg,   Will be seeing cardiologist May 15.    Taking atorvastatin 40 mg for cholesterol.    For chronic kidney disease, seeing nephrologist.

## 2025-02-26 ENCOUNTER — APPOINTMENT (OUTPATIENT)
Dept: UROLOGY | Facility: CLINIC | Age: 60
End: 2025-02-26
Payer: COMMERCIAL

## 2025-02-26 VITALS — SYSTOLIC BLOOD PRESSURE: 148 MMHG | DIASTOLIC BLOOD PRESSURE: 74 MMHG | HEART RATE: 76 BPM

## 2025-02-26 DIAGNOSIS — N18.9 CHRONIC KIDNEY DISEASE, UNSPECIFIED CKD STAGE: ICD-10-CM

## 2025-02-26 DIAGNOSIS — N18.30 STAGE 3 CHRONIC KIDNEY DISEASE, UNSPECIFIED WHETHER STAGE 3A OR 3B CKD (MULTI): ICD-10-CM

## 2025-02-26 DIAGNOSIS — R33.9 URINARY RETENTION: Primary | ICD-10-CM

## 2025-02-26 DIAGNOSIS — D64.9 ANEMIA, UNSPECIFIED TYPE: ICD-10-CM

## 2025-02-26 DIAGNOSIS — R33.9 URINARY RETENTION: ICD-10-CM

## 2025-02-26 DIAGNOSIS — R39.15 URGENCY OF URINATION: ICD-10-CM

## 2025-02-26 DIAGNOSIS — I11.0 BENIGN HYPERTENSIVE HEART DISEASE WITH HEART FAILURE: ICD-10-CM

## 2025-02-26 PROCEDURE — 3077F SYST BP >= 140 MM HG: CPT | Performed by: NURSE PRACTITIONER

## 2025-02-26 PROCEDURE — 99204 OFFICE O/P NEW MOD 45 MIN: CPT | Performed by: NURSE PRACTITIONER

## 2025-02-26 PROCEDURE — 51700 IRRIGATION OF BLADDER: CPT | Performed by: NURSE PRACTITIONER

## 2025-02-26 PROCEDURE — 3060F POS MICROALBUMINURIA REV: CPT | Performed by: NURSE PRACTITIONER

## 2025-02-26 PROCEDURE — 3078F DIAST BP <80 MM HG: CPT | Performed by: NURSE PRACTITIONER

## 2025-02-26 PROCEDURE — 1036F TOBACCO NON-USER: CPT | Performed by: NURSE PRACTITIONER

## 2025-02-26 PROCEDURE — 3052F HG A1C>EQUAL 8.0%<EQUAL 9.0%: CPT | Performed by: NURSE PRACTITIONER

## 2025-02-26 NOTE — PROGRESS NOTES
UROLOGIC INITIAL EVALUATION     PROBLEM LIST:  1. Urinary retention  Voiding Trial      2. Urgency of urination  Voiding Trial           HISTORY OF PRESENT ILLNESS:   Nuris Squires is a 59 y.o. 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 01/06/2025    Personal history of other diseases of the circulatory system     History of hypertension    Personal history of other endocrine, nutritional and metabolic disease     History of hyperlipidemia    Personal history of other endocrine, nutritional and metabolic disease     History of diabetes mellitus       PAST SURGICAL HISTORY:  Past Surgical History:   Procedure Laterality Date    CARDIAC CATHETERIZATION N/A 1/8/2025    Procedure: Right Heart Cath;  Surgeon: Migel Stanton MD;  Location: [place] [address] [facility];  Service: Cardiovascular;  Laterality: N/A;    CARDIAC CATHETERIZATION N/A 1/17/2025    Procedure: Right Heart Cath;  Surgeon: Ana Lea MD;  Location: [place] [address] [facility];  Service: Cardiovascular;  Laterality: N/A;    CT ANGIO NECK  1/25/2023    CT NECK ANGIO W AND WO IV CONTRAST 1/25/2023 [facility]    CT HEAD ANGIO W AND WO IV CONTRAST  1/25/2023    CT HEAD ANGIO W AND WO IV CONTRAST 1/25/2023 DOCTOR OFFICE [facility]GAC[facility]    OTHER SURGICAL HISTORY  10/21/2020    Toe amputation        ALLERGIES:   No Known Allergies     MEDICATIONS:   Current Outpatient Medications on File Prior to Visit   Medication Sig Dispense Refill    acetaminophen (Tylenol) 325 mg tablet Take 2 tablets (650 mg) by mouth every  4 hours if needed for mild pain (1 - 3). 30 tablet 0    albuterol 90 mcg/actuation inhaler Inhale 2 puffs every 6 hours if needed for wheezing. 6.7 g 0    aspirin 81 mg EC tablet Take 1 tablet (81 mg) by mouth once daily. 30 tablet 3    atorvastatin (Lipitor) 40 mg tablet Take 1 tablet (40 mg) by mouth once daily. 30 tablet 1    B complex-vitamin C-folic acid (Nephro-Duke) 0.8 mg tablet Take 1 tablet by mouth once daily. 30 tablet 1    blood-glucose sensor (FreeStyle Remington 3 Sensor) device Use to monitor blood glucose. Change sensor every 14 days. 2 each 11    carvedilol (Coreg) 12.5 mg tablet Take 1 tablet (12.5 mg) by mouth 2 times a day. 60 tablet 1    ferrous sulfate tablet Take 1 tablet (325 mg) by mouth once daily with breakfast. 30 tablet 1    folic acid (Folvite) 1 mg tablet Take 1 tablet (1 mg) by mouth once daily. 30 tablet 1    FreeStyle Remington 3 [place] misc Use as instructed to monitor blood glucose. 1 each 0    gabapentin (Neurontin) 300 mg capsule Take 1 capsule (300 mg) by mouth 2 times a day. 180 capsule 3    hydrALAZINE (Apresoline) 50 mg tablet Take 0.5 tablets (25 mg) by mouth 3 times a day. 135 tablet 3    insulin glargine (Basaglar KwikPen U-100 Insulin) 100 unit/mL (3 mL) pen Inject 15 Units under the skin once daily in the morning. Take as directed per insulin instructions.      insulin lispro (Admelog SoloStar U-100 Insulin) 100 unit/mL injection Inject per sliding scale instructions under the skin 3 times a day with meals. (Max daily dose 70 units) 15 mL 1    isosorbide mononitrate ER (Imdur) 30 mg 24 hr tablet Take 1 tablet (30 mg) by mouth once daily. Do not crush or chew. 30 tablet 3    OneTouch Delica Plus Lancet 30 gauge misc USE TO TEST BLOOD SUGAR AS DIRECTED 200 each 0    OneTouch Ultra Test strip Use 1 strip BID to check glucose 200 strip 1    OneTouch Ultra2 Meter misc use 1 to 2 times daily 1 each 0    oxybutynin (Ditropan) 5 mg tablet Take 1 tablet (5 mg) by mouth 3 times a day.  "90 tablet 1    pen needle, diabetic 32 gauge x 5/32\" needle Use four times a day with insulin use 360 each 1    tirzepatide (Mounjaro) 2.5 mg/0.5 mL pen injector Inject 2.5 mg under the skin every 7 days. 2 mL 3    torsemide (Demadex) 20 mg tablet Take 2 tablets (40 mg) by mouth once daily as needed (Please take one tab daily if you experience any of the following: increased shortness of breath, increased swelling of your lower extremities, or sudden weight gain of 3lbs or more in a few days, or 5lbs or more in a week). 30 tablet 1    [DISCONTINUED] aspirin 81 mg EC tablet Take 1 tablet (81 mg) by mouth once daily. 30 tablet 1    [DISCONTINUED] ferrous sulfate, 325 mg ferrous sulfate, tablet Take 1 tablet (325 mg) by mouth once daily with breakfast. 30 tablet 1    [DISCONTINUED] gabapentin (Neurontin) 300 mg capsule Take 1 capsule (300 mg) by mouth 2 times a day. 180 capsule 0    [DISCONTINUED] isosorbide mononitrate ER (Imdur) 30 mg 24 hr tablet Take 1 tablet (30 mg) by mouth once daily. Do not crush or chew. 30 tablet 1     No current facility-administered medications on file prior to visit.        SOCIAL HISTORY:  Patient  reports that she has never smoked. She has never been exposed to tobacco smoke. She has never used smokeless tobacco. She reports current alcohol use. She reports that she does not use drugs.   Social History     Socioeconomic History    Marital status: Single     Spouse name: Not on file    Number of children: Not on file    Years of education: Not on file    Highest education level: Not on file   Occupational History    Not on file   Tobacco Use    Smoking status: Never     Passive exposure: Never    Smokeless tobacco: Never   Vaping Use    Vaping status: Never Used   Substance and Sexual Activity    Alcohol use: Yes    Drug use: Never    Sexual activity: Defer   Other Topics Concern    Not on file   Social History Narrative    Not on file     Social Drivers of Health     Financial Resource " Strain: Low Risk  (1/13/2025)    Overall Financial Resource Strain (CARDIA)     Difficulty of Paying Living Expenses: Not hard at all   Food Insecurity: No Food Insecurity (1/2/2025)    Hunger Vital Sign     Worried About Running Out of Food in the Last Year: Never true     Ran Out of Food in the Last Year: Never true   Transportation Needs: No Transportation Needs (1/13/2025)    PRAPARE - Transportation     Lack of Transportation (Medical): No     Lack of Transportation (Non-Medical): No   Physical Activity: Insufficiently Active (1/2/2025)    Exercise Vital Sign     Days of Exercise per Week: 2 days     Minutes of Exercise per Session: 20 min   Stress: No Stress Concern Present (1/25/2024)    [employer] - Occupational Stress Questionnaire     Feeling of Stress : Only a little   Social Connections: Feeling Socially Integrated (6/26/2024)    OASIS : Social Isolation     Frequency of experiencing loneliness or isolation: Never   Intimate Partner Violence: Not At Risk (1/2/2025)    Humiliation, Afraid, Rape, and Kick questionnaire     Fear of Current or Ex-Partner: No     Emotionally Abused: No     Physically Abused: No     Sexually Abused: No   Housing Stability: Low Risk  (1/13/2025)    Housing Stability Vital Sign     Unable to Pay for Housing in the Last Year: No     Number of Times Moved in the Last Year: 1     Homeless in the Last Year: No       FAMILY HISTORY:  Family History   Problem Relation Name Age of Onset    Alzheimer's disease Mother      Diabetes Mother      Lung cancer Mother      Diabetes Father      Lung cancer Father      Pancreatic cancer Father      Diabetes Sister      Lung cancer Sister         REVIEW OF SYSTEMS:  All systems reviewed, pertinent negatives as noted in HPI.     PHYSICAL EXAM:  Visit Vitals  /74   Pulse 76     Constitutional: Well-developed and well-nourished. No distress.    Head: Normocephalic and atraumatic.    Neck: Normal range of  motion.     Pulmonary/Chest: Effort normal. No respiratory distress.   Abdominal: Non-distended.  : See below.  Integumentary: No rash or lesions visualized.  Musculoskeletal: Normal range of motion.    Neurological: Alert, grossly intact.  Psychiatric: Normal mood and affect. Thought content normal.      LABORATORY REVIEW:   Lab Results   Component Value Date    BUN 40 (H) 02/05/2025    CREATININE 2.44 (H) 02/05/2025    EGFR 22 (L) 02/05/2025     02/05/2025    K 4.5 02/05/2025     (H) 02/05/2025    CO2 20 02/05/2025    CALCIUM 8.9 02/05/2025      Lab Results   Component Value Date    WBC 6.6 02/05/2025    RBC 3.37 (L) 02/05/2025    HGB 10.2 (L) 02/05/2025    HCT 32.2 (L) 02/05/2025    MCV 95.5 02/05/2025    MCH 30.3 02/05/2025    MCHC 31.7 (L) 02/05/2025    RDW 14.3 02/05/2025     02/05/2025    MPV 9.6 02/05/2025             Assessment:      1. Urinary retention  Voiding Trial      2. Urgency of urination  Voiding Trial          Nuris Squires is a 59 y.o. with urinary retention during hospitalization for respiratory distress  Catheter placed 1/15/25, Urology consulted; suspicion for contribution of uncontrolled DM2  Reports urinary frequency, some urge & stress incontinence prior to retention  Unsuccessful TOV now; 250 mL instilled, unable to void  Catheter replaced now     Plan:   Schedule for UDS  RTC with Dr. Amaral for UDS interpretation  Encouraged to contact us in the interim with any questions, concerns

## 2025-02-27 ENCOUNTER — APPOINTMENT (OUTPATIENT)
Dept: ORTHOPEDIC SURGERY | Facility: HOSPITAL | Age: 60
End: 2025-02-27
Payer: COMMERCIAL

## 2025-02-27 ENCOUNTER — HOSPITAL ENCOUNTER (OUTPATIENT)
Dept: RADIOLOGY | Facility: HOSPITAL | Age: 60
Discharge: HOME | End: 2025-02-27
Payer: COMMERCIAL

## 2025-02-27 DIAGNOSIS — S72.8X2A OTHER CLOSED FRACTURE OF LEFT FEMUR, UNSPECIFIED PORTION OF FEMUR, INITIAL ENCOUNTER: ICD-10-CM

## 2025-02-27 DIAGNOSIS — S72.432K CLOSED BICONDYLAR FRACTURE OF DISTAL FEMUR, LEFT, WITH NONUNION, SUBSEQUENT ENCOUNTER: Primary | ICD-10-CM

## 2025-02-27 DIAGNOSIS — S72.422K CLOSED BICONDYLAR FRACTURE OF DISTAL FEMUR, LEFT, WITH NONUNION, SUBSEQUENT ENCOUNTER: Primary | ICD-10-CM

## 2025-02-27 PROCEDURE — 3052F HG A1C>EQUAL 8.0%<EQUAL 9.0%: CPT | Performed by: ORTHOPAEDIC SURGERY

## 2025-02-27 PROCEDURE — 3060F POS MICROALBUMINURIA REV: CPT | Performed by: ORTHOPAEDIC SURGERY

## 2025-02-27 PROCEDURE — 73552 X-RAY EXAM OF FEMUR 2/>: CPT | Mod: LT

## 2025-02-27 PROCEDURE — 99214 OFFICE O/P EST MOD 30 MIN: CPT | Performed by: ORTHOPAEDIC SURGERY

## 2025-02-27 PROCEDURE — 73560 X-RAY EXAM OF KNEE 1 OR 2: CPT | Mod: LT

## 2025-02-27 PROCEDURE — 1036F TOBACCO NON-USER: CPT | Performed by: ORTHOPAEDIC SURGERY

## 2025-02-27 RX ORDER — OXYBUTYNIN CHLORIDE 5 MG/1
5 TABLET ORAL 3 TIMES DAILY
Qty: 90 TABLET | Refills: 1 | Status: SHIPPED | OUTPATIENT
Start: 2025-02-27 | End: 2025-04-28

## 2025-02-27 RX ORDER — FERROUS SULFATE 325(65) MG
65 TABLET ORAL
Qty: 90 TABLET | Refills: 0 | Status: SHIPPED | OUTPATIENT
Start: 2025-02-27 | End: 2025-05-28

## 2025-02-27 RX ORDER — HYDRALAZINE HYDROCHLORIDE 50 MG/1
25 TABLET, FILM COATED ORAL 3 TIMES DAILY
Qty: 135 TABLET | Refills: 1 | Status: SHIPPED | OUTPATIENT
Start: 2025-02-27 | End: 2025-08-26

## 2025-02-27 NOTE — H&P (VIEW-ONLY)
Nuris Squires is  post-op from intramedullary nail and ORIF left distal femur fracture on 4/16/2024.  she is doing well at this point.  Pain is well controlled  Denies fevers or chills.  Denies drainage from the wound.  she reports no additional symptoms or concerns. No shortness of breath or chest pain. No calf swelling or pain.    The patients full medical history, surgical history, medications, allergies, family, medical history, social history, and a complete 14 point review of systems is documented in the medical record on the signed, scanned medical intake sheet or reviewed in the history of present illness. Review of systems otherwise negative    Past Medical History:   Diagnosis Date    Personal history of other diseases of the circulatory system     History of hypertension    Personal history of other endocrine, nutritional and metabolic disease     History of hyperlipidemia    Personal history of other endocrine, nutritional and metabolic disease     History of diabetes mellitus       Medication Documentation Review Audit       Reviewed by Tara Matos MA (Medical Assistant) on 02/26/25 at 1056      Medication Order Taking? Sig Documenting Provider Last Dose Status   acetaminophen (Tylenol) 325 mg tablet [id number] Yes Take 2 tablets (650 mg) by mouth every 4 hours if needed for mild pain (1 - 3). SEDA Finney  Active   albuterol 90 mcg/actuation inhaler [id number] Yes Inhale 2 puffs every 6 hours if needed for wheezing. Mone Aquino MD  Active     Discontinued 02/20/25 1115   aspirin 81 mg EC tablet [id number] Yes Take 1 tablet (81 mg) by mouth once daily. Mone Aquino MD  Active   atorvastatin (Lipitor) 40 mg tablet [id number] Yes Take 1 tablet (40 mg) by mouth once daily. SEDA Finney  Active   B complex-vitamin C-folic acid (Nephro-Duke) 0.8 mg tablet [id number] Yes Take 1 tablet by mouth once daily. SEDA Finney  Active   blood-glucose sensor (FreeStyle Remington  3 Sensor) device [id number] Yes Use to monitor blood glucose. Change sensor every 14 days. Mone Aquino MD  Active   carvedilol (Coreg) 12.5 mg tablet [id number] Yes Take 1 tablet (12.5 mg) by mouth 2 times a day. SEDA Finney  Active   ferrous sulfate tablet [id number] Yes Take 1 tablet (325 mg) by mouth once daily with breakfast. Mone Aquino MD  Active     Discontinued 02/20/25 1115   folic acid (Folvite) 1 mg tablet [id number] Yes Take 1 tablet (1 mg) by mouth once daily. SEDA Finney  Active   FreeStyle Remington 3 [place] misc [id number] Yes Use as instructed to monitor blood glucose. Mone Aquino MD  Active     Discontinued 02/20/25 1115   gabapentin (Neurontin) 300 mg capsule [id number] Yes Take 1 capsule (300 mg) by mouth 2 times a day. Mone Aquino MD  Active   hydrALAZINE (Apresoline) 50 mg tablet [id number] Yes Take 0.5 tablets (25 mg) by mouth 3 times a day. Marylin Sparks MD [facility]  Active   insulin glargine (Basaglar KwikPen U-100 Insulin) 100 unit/mL (3 mL) pen [id number] Yes Inject 15 Units under the skin once daily in the morning. Take as directed per insulin instructions. SEDA Finney  Active   insulin lispro (Admelog SoloStar U-100 Insulin) 100 unit/mL injection [id number] Yes Inject per sliding scale instructions under the skin 3 times a day with meals. (Max daily dose 70 units) Mone Aquino MD  Active     Discontinued 02/20/25 1115   isosorbide mononitrate ER (Imdur) 30 mg 24 hr tablet [id number] Yes Take 1 tablet (30 mg) by mouth once daily. Do not crush or chew. Mone Aquino MD  Active   OneTouch Delica Plus Lancet 30 gauge misc [id number] Yes USE TO TEST BLOOD SUGAR AS DIRECTED Mone Aquino MD  Active   OneTouch Ultra Test strip [id number] Yes Use 1 strip BID to check glucose Mone Aquino MD  Active   OneTouch Ultra2 Meter misc [id number] Yes use 1 to 2 times daily Mone Aquino MD  Active   oxybutynin (Ditropan) 5 mg tablet [id number] Yes Take 1  "tablet (5 mg) by mouth 3 times a day. SEDA Finney  Active   pen needle, diabetic 32 gauge x 5/32\" needle [id number] Yes Use four times a day with insulin use Mone Aquino MD  Active   tirzepatide (Mounjaro) 2.5 mg/0.5 mL pen injector [id number] Yes Inject 2.5 mg under the skin every 7 days. Mone Aquino MD  Active   torsemide (Demadex) 20 mg tablet [id number] Yes Take 2 tablets (40 mg) by mouth once daily as needed (Please take one tab daily if you experience any of the following: increased shortness of breath, increased swelling of your lower extremities, or sudden weight gain of 3lbs or more in a few days, or 5lbs or more in a week). SEDA Finney  Active                    No Known Allergies    Social History     Socioeconomic History    Marital status: Single     Spouse name: Not on file    Number of children: Not on file    Years of education: Not on file    Highest education level: Not on file   Occupational History    Not on file   Tobacco Use    Smoking status: Never     Passive exposure: Never    Smokeless tobacco: Never   Vaping Use    Vaping status: Never Used   Substance and Sexual Activity    Alcohol use: Yes    Drug use: Never    Sexual activity: Defer   Other Topics Concern    Not on file   Social History Narrative    Not on file     Social Drivers of Health     Financial Resource Strain: Low Risk  (1/13/2025)    Overall Financial Resource Strain (CARDIA)     Difficulty of Paying Living Expenses: Not hard at all   Food Insecurity: No Food Insecurity (1/2/2025)    Hunger Vital Sign     Worried About Running Out of Food in the Last Year: Never true     Ran Out of Food in the Last Year: Never true   Transportation Needs: No Transportation Needs (1/13/2025)    PRAPARE - Transportation     Lack of Transportation (Medical): No     Lack of Transportation (Non-Medical): No   Physical Activity: Insufficiently Active (1/2/2025)    Exercise Vital Sign     Days of Exercise per Week: 2 " days     Minutes of Exercise per Session: 20 min   Stress: No Stress Concern Present (1/25/2024)    [employer] - Occupational Stress Questionnaire     Feeling of Stress : Only a little   Social Connections: Feeling Socially Integrated (6/26/2024)    OASIS : Social Isolation     Frequency of experiencing loneliness or isolation: Never   Intimate Partner Violence: Not At Risk (1/2/2025)    Humiliation, Afraid, Rape, and Kick questionnaire     Fear of Current or Ex-Partner: No     Emotionally Abused: No     Physically Abused: No     Sexually Abused: No   Housing Stability: Low Risk  (1/13/2025)    Housing Stability Vital Sign     Unable to Pay for Housing in the Last Year: No     Number of Times Moved in the Last Year: 1     Homeless in the Last Year: No       Past Surgical History:   Procedure Laterality Date    CARDIAC CATHETERIZATION N/A 1/8/2025    Procedure: Right Heart Cath;  Surgeon: Migel Stanton MD;  Location: [place] [address] [facility];  Service: Cardiovascular;  Laterality: N/A;    CARDIAC CATHETERIZATION N/A 1/17/2025    Procedure: Right Heart Cath;  Surgeon: Ana Lea MD;  Location: [place] [address] Cardiac [facility] Lab;  Service: Cardiovascular;  Laterality: N/A;    CT ANGIO NECK  1/25/2023    CT NECK ANGIO W AND WO IV CONTRAST 1/25/2023 [facility]CTOR [facility]ICE [facility]    CT HEAD ANGIO W AND WO IV CONTRAST  1/25/2023    CT HEAD ANGIO W AND WO IV CONTRAST 1/25/2023 DOCTOR OFFICE [facility]GAC[facility]    OTHER SURGICAL HISTORY  10/21/2020    Toe amputation       Gen: The patient is alert and oriented ×3, is in no acute distress, and appear their stated age and weight.    Psychiatric: Mood and affect are appropriate.    Eyes: Sclera are white, and pupils are round and symmetric.    ENT: Mucous membranes are moist.     Neck: Supple. Thyroid is midline.    Respiratory: Respirations are nonlabored, chest rise is symmetric.    Cardiac: Rate is regular by palpation of distal pulses.     Abdomen:  Nondistended.    Integument: No obvious cutaneous lesions are noted. No signs of lymphangitis. No signs of systemic edema.  side: left lower extremity :  her  surgical incisions are healing well, without evidence of erythema, fluctuance, drainage, or infection.  The skin around the incision is intact.  Distally neurovascular exam is stable.  There is appropriate tenderness to palpation in the selene-incisional area. No calf swelling or tenderness to palpation.      I personally reviewed multiple views of left knee and femur were obtained in the office today demonstrate maintenance of reduction, interval healing, and a stable position of the hardware.  Additionally she has a lot of callus on the lateral side but not as much on the medial side concerning for nonunion.      Nuris Squires is a 59 y.o. female patient status post intramedullary nail and ORIF left distal femur fracture on 4/16/2024.   I went over her x-rays in detail today.   she is WBAT of the side: left lower extremity. ~He/she~ is range of motion as tolerated of the side: left lower extremity.  I stressed the importance of physical therapy on overall functional outcome.  To work on range of motion.  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  We discussed multiple options I think should be good candidate for exchange nailing plus bone marrow aspirate into the nonunion.  We will plan on doing this on 3/10/2025.  She has a 10 x 340 [place] nail 1.  I answered all patient's questions he agrees with treatment plan.  I will see her back in Follow-up postoperatively with 2 views of the left knee and 2 views left femur    Contact number [phone]    Barrera Rodriguez    Department of Orthopaedic Trauma Surgery

## 2025-02-27 NOTE — PROGRESS NOTES
Nuris Squires is  post-op from intramedullary nail and ORIF left distal femur fracture on 4/16/2024.  she is doing well at this point.  Pain is well controlled  Denies fevers or chills.  Denies drainage from the wound.  she reports no additional symptoms or concerns. No shortness of breath or chest pain. No calf swelling or pain.    The patients full medical history, surgical history, medications, allergies, family, medical history, social history, and a complete 14 point review of systems is documented in the medical record on the signed, scanned medical intake sheet or reviewed in the history of present illness. Review of systems otherwise negative    Past Medical History:   Diagnosis Date    Personal history of other diseases of the circulatory system     History of hypertension    Personal history of other endocrine, nutritional and metabolic disease     History of hyperlipidemia    Personal history of other endocrine, nutritional and metabolic disease     History of diabetes mellitus       Medication Documentation Review Audit       Reviewed by Tara Matos MA (Medical Assistant) on 02/26/25 at 1056      Medication Order Taking? Sig Documenting Provider Last Dose Status   acetaminophen (Tylenol) 325 mg tablet [id number] Yes Take 2 tablets (650 mg) by mouth every 4 hours if needed for mild pain (1 - 3). SEDA Finney  Active   albuterol 90 mcg/actuation inhaler [id number] Yes Inhale 2 puffs every 6 hours if needed for wheezing. Mone Aquino MD  Active     Discontinued 02/20/25 1115   aspirin 81 mg EC tablet [id number] Yes Take 1 tablet (81 mg) by mouth once daily. Mone Aquino MD  Active   atorvastatin (Lipitor) 40 mg tablet [id number] Yes Take 1 tablet (40 mg) by mouth once daily. SEDA Finney  Active   B complex-vitamin C-folic acid (Nephro-Duke) 0.8 mg tablet [id number] Yes Take 1 tablet by mouth once daily. SEDA Finney  Active   blood-glucose sensor (FreeStyle Remington  3 Sensor) device [id number] Yes Use to monitor blood glucose. Change sensor every 14 days. Mone Aquino MD  Active   carvedilol (Coreg) 12.5 mg tablet [id number] Yes Take 1 tablet (12.5 mg) by mouth 2 times a day. SEDA Finney  Active   ferrous sulfate tablet [id number] Yes Take 1 tablet (325 mg) by mouth once daily with breakfast. Mone Aquino MD  Active     Discontinued 02/20/25 1115   folic acid (Folvite) 1 mg tablet [id number] Yes Take 1 tablet (1 mg) by mouth once daily. SEDA Finney  Active   FreeStyle Remington 3 [place] misc [id number] Yes Use as instructed to monitor blood glucose. Mone Aquino MD  Active     Discontinued 02/20/25 1115   gabapentin (Neurontin) 300 mg capsule [id number] Yes Take 1 capsule (300 mg) by mouth 2 times a day. Mone Aquino MD  Active   hydrALAZINE (Apresoline) 50 mg tablet [id number] Yes Take 0.5 tablets (25 mg) by mouth 3 times a day. Marylin Sparks MD [facility]  Active   insulin glargine (Basaglar KwikPen U-100 Insulin) 100 unit/mL (3 mL) pen [id number] Yes Inject 15 Units under the skin once daily in the morning. Take as directed per insulin instructions. SEDA Finney  Active   insulin lispro (Admelog SoloStar U-100 Insulin) 100 unit/mL injection [id number] Yes Inject per sliding scale instructions under the skin 3 times a day with meals. (Max daily dose 70 units) Mone Aquino MD  Active     Discontinued 02/20/25 1115   isosorbide mononitrate ER (Imdur) 30 mg 24 hr tablet [id number] Yes Take 1 tablet (30 mg) by mouth once daily. Do not crush or chew. Mone Aquino MD  Active   OneTouch Delica Plus Lancet 30 gauge misc [id number] Yes USE TO TEST BLOOD SUGAR AS DIRECTED Mone Aquino MD  Active   OneTouch Ultra Test strip [id number] Yes Use 1 strip BID to check glucose Mone Aquino MD  Active   OneTouch Ultra2 Meter misc [id number] Yes use 1 to 2 times daily Mone Aquino MD  Active   oxybutynin (Ditropan) 5 mg tablet [id number] Yes Take 1  "tablet (5 mg) by mouth 3 times a day. SEDA Finney  Active   pen needle, diabetic 32 gauge x 5/32\" needle [id number] Yes Use four times a day with insulin use oMne Aquino MD  Active   tirzepatide (Mounjaro) 2.5 mg/0.5 mL pen injector [id number] Yes Inject 2.5 mg under the skin every 7 days. Mone Aquino MD  Active   torsemide (Demadex) 20 mg tablet [id number] Yes Take 2 tablets (40 mg) by mouth once daily as needed (Please take one tab daily if you experience any of the following: increased shortness of breath, increased swelling of your lower extremities, or sudden weight gain of 3lbs or more in a few days, or 5lbs or more in a week). SEDA Finney  Active                    No Known Allergies    Social History     Socioeconomic History    Marital status: Single     Spouse name: Not on file    Number of children: Not on file    Years of education: Not on file    Highest education level: Not on file   Occupational History    Not on file   Tobacco Use    Smoking status: Never     Passive exposure: Never    Smokeless tobacco: Never   Vaping Use    Vaping status: Never Used   Substance and Sexual Activity    Alcohol use: Yes    Drug use: Never    Sexual activity: Defer   Other Topics Concern    Not on file   Social History Narrative    Not on file     Social Drivers of Health     Financial Resource Strain: Low Risk  (1/13/2025)    Overall Financial Resource Strain (CARDIA)     Difficulty of Paying Living Expenses: Not hard at all   Food Insecurity: No Food Insecurity (1/2/2025)    Hunger Vital Sign     Worried About Running Out of Food in the Last Year: Never true     Ran Out of Food in the Last Year: Never true   Transportation Needs: No Transportation Needs (1/13/2025)    PRAPARE - Transportation     Lack of Transportation (Medical): No     Lack of Transportation (Non-Medical): No   Physical Activity: Insufficiently Active (1/2/2025)    Exercise Vital Sign     Days of Exercise per Week: 2 " days     Minutes of Exercise per Session: 20 min   Stress: No Stress Concern Present (1/25/2024)    [place] [employer] - Occupational Stress Questionnaire     Feeling of Stress : Only a little   Social Connections: Feeling Socially Integrated (6/26/2024)    OASIS : Social Isolation     Frequency of experiencing loneliness or isolation: Never   Intimate Partner Violence: Not At Risk (1/2/2025)    Humiliation, Afraid, Rape, and Kick questionnaire     Fear of Current or Ex-Partner: No     Emotionally Abused: No     Physically Abused: No     Sexually Abused: No   Housing Stability: Low Risk  (1/13/2025)    Housing Stability Vital Sign     Unable to Pay for Housing in the Last Year: No     Number of Times Moved in the Last Year: 1     Homeless in the Last Year: No       Past Surgical History:   Procedure Laterality Date    CARDIAC CATHETERIZATION N/A 1/8/2025    Procedure: Right Heart Cath;  Surgeon: Migel Stanton MD;  Location: [place] [address] Cardiac Cath Lab;  Service: Cardiovascular;  Laterality: N/A;    CARDIAC CATHETERIZATION N/A 1/17/2025    Procedure: Right Heart Cath;  Surgeon: Ana Lea MD;  Location: [place] [address] Cardiac Cath Lab;  Service: Cardiovascular;  Laterality: N/A;    CT ANGIO NECK  1/25/2023    CT NECK ANGIO W AND WO IV CONTRAST 1/25/2023 DOCTOR OFFICE LEGACY    CT HEAD ANGIO W AND WO IV CONTRAST  1/25/2023    CT HEAD ANGIO W AND WO IV CONTRAST 1/25/2023 DOCTOR OFFICE LEGACY    OTHER SURGICAL HISTORY  10/21/2020    Toe amputation       Gen: The patient is alert and oriented ×3, is in no acute distress, and appear their stated age and weight.    Psychiatric: Mood and affect are appropriate.    Eyes: Sclera are white, and pupils are round and symmetric.    ENT: Mucous membranes are moist.     Neck: Supple. Thyroid is midline.    Respiratory: Respirations are nonlabored, chest rise is symmetric.    Cardiac: Rate is regular by palpation of distal pulses.     Abdomen:  Nondistended.    Integument: No obvious cutaneous lesions are noted. No signs of lymphangitis. No signs of systemic edema.  side: left lower extremity :  her  surgical incisions are healing well, without evidence of erythema, fluctuance, drainage, or infection.  The skin around the incision is intact.  Distally neurovascular exam is stable.  There is appropriate tenderness to palpation in the selene-incisional area. No calf swelling or tenderness to palpation.      I personally reviewed multiple views of left knee and femur were obtained in the office today demonstrate maintenance of reduction, interval healing, and a stable position of the hardware.  Additionally she has a lot of callus on the lateral side but not as much on the medial side concerning for nonunion.      Nuris Squires is a 59 y.o. female patient status post intramedullary nail and ORIF left distal femur fracture on 4/16/2024.   I went over her x-rays in detail today.   she is WBAT of the side: left lower extremity. ~He/she~ is range of motion as tolerated of the side: left lower extremity.  I stressed the importance of physical therapy on overall functional outcome.  To work on range of motion.  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  We discussed multiple options I think should be good candidate for exchange nailing plus bone marrow aspirate into the nonunion.  We will plan on doing this on 3/10/2025.  She has a 10 x 340 [place] nail 1.  I answered all patient's questions he agrees with treatment plan.  I will see her back in Follow-up postoperatively with 2 views of the left knee and 2 views left femur    Contact number [phone]    Barrera Rodriguez    Department of Orthopaedic Trauma Surgery

## 2025-03-04 PROCEDURE — RXMED WILLOW AMBULATORY MEDICATION CHARGE

## 2025-03-05 ENCOUNTER — PATIENT OUTREACH (OUTPATIENT)
Dept: PRIMARY CARE | Facility: CLINIC | Age: 60
End: 2025-03-05
Payer: COMMERCIAL

## 2025-03-05 DIAGNOSIS — Z09 HOSPITAL DISCHARGE FOLLOW-UP: ICD-10-CM

## 2025-03-05 NOTE — PROGRESS NOTES
[facility] Communication for [facility] 30 day post discharge outreach. Patient is scheduled for a procedure 3/10- patient has met target of no readmission for 30 days post hospital discharge and is graduated from Transitional Care Management program at this time.

## 2025-03-06 ENCOUNTER — PHARMACY VISIT (OUTPATIENT)
Dept: PHARMACY | Facility: CLINIC | Age: 60
End: 2025-03-06
Payer: MEDICARE

## 2025-03-08 ENCOUNTER — ANESTHESIA EVENT (OUTPATIENT)
Dept: OPERATING ROOM | Facility: HOSPITAL | Age: 60
End: 2025-03-08
Payer: COMMERCIAL

## 2025-03-10 ENCOUNTER — HOSPITAL ENCOUNTER (INPATIENT)
Facility: HOSPITAL | Age: 60
LOS: 4 days | Discharge: NURSING, REHAB OR LONG-TERM CARE | End: 2025-03-14
Attending: ORTHOPAEDIC SURGERY | Admitting: ORTHOPAEDIC SURGERY
Payer: COMMERCIAL

## 2025-03-10 ENCOUNTER — APPOINTMENT (OUTPATIENT)
Dept: RADIOLOGY | Facility: HOSPITAL | Age: 60
End: 2025-03-10
Payer: COMMERCIAL

## 2025-03-10 ENCOUNTER — ANESTHESIA (OUTPATIENT)
Dept: OPERATING ROOM | Facility: HOSPITAL | Age: 60
End: 2025-03-10
Payer: COMMERCIAL

## 2025-03-10 DIAGNOSIS — S72.422K CLOSED BICONDYLAR FRACTURE OF DISTAL FEMUR, LEFT, WITH NONUNION, SUBSEQUENT ENCOUNTER: Primary | ICD-10-CM

## 2025-03-10 DIAGNOSIS — S72.432K CLOSED BICONDYLAR FRACTURE OF DISTAL FEMUR, LEFT, WITH NONUNION, SUBSEQUENT ENCOUNTER: Primary | ICD-10-CM

## 2025-03-10 PROBLEM — S72.302K: Status: ACTIVE | Noted: 2025-03-10

## 2025-03-10 LAB
ABO GROUP (TYPE) IN BLOOD: NORMAL
ANTIBODY SCREEN: NORMAL
GLUCOSE BLD MANUAL STRIP-MCNC: 152 MG/DL (ref 74–99)
GLUCOSE BLD MANUAL STRIP-MCNC: 161 MG/DL (ref 74–99)
GLUCOSE BLD MANUAL STRIP-MCNC: 167 MG/DL (ref 74–99)
GLUCOSE BLD MANUAL STRIP-MCNC: 171 MG/DL (ref 74–99)
RH FACTOR (ANTIGEN D): NORMAL

## 2025-03-10 PROCEDURE — 2500000005 HC RX 250 GENERAL PHARMACY W/O HCPCS: Performed by: ORTHOPAEDIC SURGERY

## 2025-03-10 PROCEDURE — 7100000001 HC RECOVERY ROOM TIME - INITIAL BASE CHARGE: Performed by: ORTHOPAEDIC SURGERY

## 2025-03-10 PROCEDURE — C1769 GUIDE WIRE: HCPCS | Performed by: ORTHOPAEDIC SURGERY

## 2025-03-10 PROCEDURE — 27470 REPAIR OF THIGH: CPT | Performed by: ORTHOPAEDIC SURGERY

## 2025-03-10 PROCEDURE — 7100000002 HC RECOVERY ROOM TIME - EACH INCREMENTAL 1 MINUTE: Performed by: ORTHOPAEDIC SURGERY

## 2025-03-10 PROCEDURE — 20999 UNLISTED PX MUSCSKEL GENERAL: CPT | Performed by: ORTHOPAEDIC SURGERY

## 2025-03-10 PROCEDURE — 64447 NJX AA&/STRD FEMORAL NRV IMG: CPT

## 2025-03-10 PROCEDURE — 86901 BLOOD TYPING SEROLOGIC RH(D): CPT | Performed by: STUDENT IN AN ORGANIZED HEALTH CARE EDUCATION/TRAINING PROGRAM

## 2025-03-10 PROCEDURE — 2500000005 HC RX 250 GENERAL PHARMACY W/O HCPCS

## 2025-03-10 PROCEDURE — 2500000005 HC RX 250 GENERAL PHARMACY W/O HCPCS: Performed by: NURSE ANESTHETIST, CERTIFIED REGISTERED

## 2025-03-10 PROCEDURE — 2500000005 HC RX 250 GENERAL PHARMACY W/O HCPCS: Performed by: ANESTHESIOLOGY

## 2025-03-10 PROCEDURE — 0QPC04Z REMOVAL OF INTERNAL FIXATION DEVICE FROM LEFT LOWER FEMUR, OPEN APPROACH: ICD-10-PCS | Performed by: ORTHOPAEDIC SURGERY

## 2025-03-10 PROCEDURE — A27470 PR FIX NON/MALUNION FEMUR BELOW NECK: Performed by: ANESTHESIOLOGY

## 2025-03-10 PROCEDURE — A27470 PR FIX NON/MALUNION FEMUR BELOW NECK: Performed by: NURSE ANESTHETIST, CERTIFIED REGISTERED

## 2025-03-10 PROCEDURE — 82947 ASSAY GLUCOSE BLOOD QUANT: CPT

## 2025-03-10 PROCEDURE — 2500000001 HC RX 250 WO HCPCS SELF ADMINISTERED DRUGS (ALT 637 FOR MEDICARE OP)

## 2025-03-10 PROCEDURE — 3700000001 HC GENERAL ANESTHESIA TIME - INITIAL BASE CHARGE: Performed by: ORTHOPAEDIC SURGERY

## 2025-03-10 PROCEDURE — 2720000007 HC OR 272 NO HCPCS: Performed by: ORTHOPAEDIC SURGERY

## 2025-03-10 PROCEDURE — 3600000009 HC OR TIME - EACH INCREMENTAL 1 MINUTE - PROCEDURE LEVEL FOUR: Performed by: ORTHOPAEDIC SURGERY

## 2025-03-10 PROCEDURE — 2500000004 HC RX 250 GENERAL PHARMACY W/ HCPCS (ALT 636 FOR OP/ED): Mod: JW,TB | Performed by: ANESTHESIOLOGY

## 2025-03-10 PROCEDURE — 86900 BLOOD TYPING SEROLOGIC ABO: CPT | Performed by: STUDENT IN AN ORGANIZED HEALTH CARE EDUCATION/TRAINING PROGRAM

## 2025-03-10 PROCEDURE — 2500000004 HC RX 250 GENERAL PHARMACY W/ HCPCS (ALT 636 FOR OP/ED): Performed by: NURSE ANESTHETIST, CERTIFIED REGISTERED

## 2025-03-10 PROCEDURE — 36415 COLL VENOUS BLD VENIPUNCTURE: CPT | Performed by: STUDENT IN AN ORGANIZED HEALTH CARE EDUCATION/TRAINING PROGRAM

## 2025-03-10 PROCEDURE — 3700000002 HC GENERAL ANESTHESIA TIME - EACH INCREMENTAL 1 MINUTE: Performed by: ORTHOPAEDIC SURGERY

## 2025-03-10 PROCEDURE — 3600000004 HC OR TIME - INITIAL BASE CHARGE - PROCEDURE LEVEL FOUR: Performed by: ORTHOPAEDIC SURGERY

## 2025-03-10 PROCEDURE — 0QP704Z REMOVAL OF INTERNAL FIXATION DEVICE FROM LEFT UPPER FEMUR, OPEN APPROACH: ICD-10-PCS | Performed by: ORTHOPAEDIC SURGERY

## 2025-03-10 PROCEDURE — 2500000004 HC RX 250 GENERAL PHARMACY W/ HCPCS (ALT 636 FOR OP/ED)

## 2025-03-10 PROCEDURE — 99223 1ST HOSP IP/OBS HIGH 75: CPT | Performed by: STUDENT IN AN ORGANIZED HEALTH CARE EDUCATION/TRAINING PROGRAM

## 2025-03-10 PROCEDURE — 2780000003 HC OR 278 NO HCPCS: Performed by: ORTHOPAEDIC SURGERY

## 2025-03-10 PROCEDURE — 2500000004 HC RX 250 GENERAL PHARMACY W/ HCPCS (ALT 636 FOR OP/ED): Performed by: ANESTHESIOLOGY

## 2025-03-10 PROCEDURE — 99222 1ST HOSP IP/OBS MODERATE 55: CPT | Performed by: ORTHOPAEDIC SURGERY

## 2025-03-10 PROCEDURE — 0QHC06Z INSERTION OF INTRAMEDULLARY INTERNAL FIXATION DEVICE INTO LEFT LOWER FEMUR, OPEN APPROACH: ICD-10-PCS | Performed by: ORTHOPAEDIC SURGERY

## 2025-03-10 PROCEDURE — C1713 ANCHOR/SCREW BN/BN,TIS/BN: HCPCS | Performed by: ORTHOPAEDIC SURGERY

## 2025-03-10 PROCEDURE — 1100000001 HC PRIVATE ROOM DAILY

## 2025-03-10 DEVICE — POWER MIX
Type: IMPLANTABLE DEVICE | Site: FEMUR | Status: FUNCTIONAL
Brand: MINI IGNITE 2.0

## 2025-03-10 DEVICE — END CAP, SCN
Type: IMPLANTABLE DEVICE | Site: FEMUR | Status: FUNCTIONAL
Brand: T2

## 2025-03-10 DEVICE — K-WIRE: Type: IMPLANTABLE DEVICE | Site: FEMUR | Status: NON-FUNCTIONAL

## 2025-03-10 DEVICE — ADVANCED LOCKING SCREW: Type: IMPLANTABLE DEVICE | Site: FEMUR | Status: FUNCTIONAL

## 2025-03-10 DEVICE — IMPLANTABLE DEVICE
Type: IMPLANTABLE DEVICE | Site: FEMUR | Status: FUNCTIONAL
Brand: T2

## 2025-03-10 DEVICE — LOCKING SCREW
Type: IMPLANTABLE DEVICE | Site: FEMUR | Status: FUNCTIONAL
Brand: T2 ALPHA

## 2025-03-10 DEVICE — GUIDE WIRE, BALL-TIPPED, STERILE: Type: IMPLANTABLE DEVICE | Site: FEMUR | Status: NON-FUNCTIONAL

## 2025-03-10 RX ORDER — ERGOCALCIFEROL 1.25 MG/1
1.25 CAPSULE ORAL
Qty: 8 CAPSULE | Refills: 0 | Status: SHIPPED | OUTPATIENT
Start: 2025-03-16 | End: 2025-05-11

## 2025-03-10 RX ORDER — SODIUM CHLORIDE, SODIUM LACTATE, POTASSIUM CHLORIDE, CALCIUM CHLORIDE 600; 310; 30; 20 MG/100ML; MG/100ML; MG/100ML; MG/100ML
100 INJECTION, SOLUTION INTRAVENOUS CONTINUOUS
Status: ACTIVE | OUTPATIENT
Start: 2025-03-10 | End: 2025-03-11

## 2025-03-10 RX ORDER — ACETAMINOPHEN 325 MG/1
650 TABLET ORAL EVERY 4 HOURS PRN
Status: DISCONTINUED | OUTPATIENT
Start: 2025-03-10 | End: 2025-03-10 | Stop reason: HOSPADM

## 2025-03-10 RX ORDER — LIDOCAINE HCL/PF 100 MG/5ML
SYRINGE (ML) INTRAVENOUS AS NEEDED
Status: DISCONTINUED | OUTPATIENT
Start: 2025-03-10 | End: 2025-03-10

## 2025-03-10 RX ORDER — HYDROMORPHONE HYDROCHLORIDE 1 MG/ML
0.4 INJECTION, SOLUTION INTRAMUSCULAR; INTRAVENOUS; SUBCUTANEOUS EVERY 4 HOURS PRN
Status: DISCONTINUED | OUTPATIENT
Start: 2025-03-10 | End: 2025-03-14 | Stop reason: HOSPADM

## 2025-03-10 RX ORDER — PROCHLORPERAZINE MALEATE 10 MG
10 TABLET ORAL EVERY 6 HOURS PRN
Status: DISCONTINUED | OUTPATIENT
Start: 2025-03-10 | End: 2025-03-14 | Stop reason: HOSPADM

## 2025-03-10 RX ORDER — OXYCODONE HYDROCHLORIDE 5 MG/1
5 TABLET ORAL EVERY 4 HOURS PRN
Status: DISCONTINUED | OUTPATIENT
Start: 2025-03-10 | End: 2025-03-14 | Stop reason: HOSPADM

## 2025-03-10 RX ORDER — PROCHLORPERAZINE 25 MG/1
25 SUPPOSITORY RECTAL EVERY 12 HOURS PRN
Status: DISCONTINUED | OUTPATIENT
Start: 2025-03-10 | End: 2025-03-14 | Stop reason: HOSPADM

## 2025-03-10 RX ORDER — ASPIRIN 81 MG/1
81 TABLET ORAL 2 TIMES DAILY
Status: DISCONTINUED | OUTPATIENT
Start: 2025-03-10 | End: 2025-03-14 | Stop reason: HOSPADM

## 2025-03-10 RX ORDER — OXYCODONE HYDROCHLORIDE 5 MG/1
10 TABLET ORAL EVERY 4 HOURS PRN
Status: DISCONTINUED | OUTPATIENT
Start: 2025-03-10 | End: 2025-03-14 | Stop reason: HOSPADM

## 2025-03-10 RX ORDER — NALOXONE HYDROCHLORIDE 0.4 MG/ML
0.2 INJECTION, SOLUTION INTRAMUSCULAR; INTRAVENOUS; SUBCUTANEOUS EVERY 5 MIN PRN
Status: DISCONTINUED | OUTPATIENT
Start: 2025-03-10 | End: 2025-03-14 | Stop reason: HOSPADM

## 2025-03-10 RX ORDER — ASPIRIN 81 MG/1
81 TABLET ORAL 2 TIMES DAILY
Qty: 84 TABLET | Refills: 0 | Status: SHIPPED | OUTPATIENT
Start: 2025-03-10 | End: 2025-04-21

## 2025-03-10 RX ORDER — METHOCARBAMOL 100 MG/ML
1000 INJECTION, SOLUTION INTRAMUSCULAR; INTRAVENOUS ONCE
Status: COMPLETED | OUTPATIENT
Start: 2025-03-10 | End: 2025-03-10

## 2025-03-10 RX ORDER — HYDRALAZINE HYDROCHLORIDE 20 MG/ML
INJECTION INTRAMUSCULAR; INTRAVENOUS AS NEEDED
Status: DISCONTINUED | OUTPATIENT
Start: 2025-03-10 | End: 2025-03-10

## 2025-03-10 RX ORDER — POLYETHYLENE GLYCOL 3350 17 G/17G
17 POWDER, FOR SOLUTION ORAL DAILY
Status: DISCONTINUED | OUTPATIENT
Start: 2025-03-10 | End: 2025-03-13

## 2025-03-10 RX ORDER — CYCLOBENZAPRINE HCL 10 MG
10 TABLET ORAL 3 TIMES DAILY PRN
Status: DISCONTINUED | OUTPATIENT
Start: 2025-03-10 | End: 2025-03-14 | Stop reason: HOSPADM

## 2025-03-10 RX ORDER — ROCURONIUM BROMIDE 10 MG/ML
INJECTION, SOLUTION INTRAVENOUS AS NEEDED
Status: DISCONTINUED | OUTPATIENT
Start: 2025-03-10 | End: 2025-03-10

## 2025-03-10 RX ORDER — DOCUSATE SODIUM 100 MG/1
100 CAPSULE, LIQUID FILLED ORAL 2 TIMES DAILY PRN
Qty: 30 CAPSULE | Refills: 0 | Status: SHIPPED | OUTPATIENT
Start: 2025-03-10 | End: 2025-03-25

## 2025-03-10 RX ORDER — OXYCODONE HYDROCHLORIDE 5 MG/1
5 TABLET ORAL EVERY 4 HOURS PRN
Qty: 42 TABLET | Refills: 0 | Status: SHIPPED | OUTPATIENT
Start: 2025-03-10 | End: 2025-03-12

## 2025-03-10 RX ORDER — BISACODYL 10 MG/1
10 SUPPOSITORY RECTAL DAILY PRN
Status: DISCONTINUED | OUTPATIENT
Start: 2025-03-10 | End: 2025-03-14

## 2025-03-10 RX ORDER — SODIUM CHLORIDE 0.9 G/100ML
INJECTION, SOLUTION IRRIGATION AS NEEDED
Status: DISCONTINUED | OUTPATIENT
Start: 2025-03-10 | End: 2025-03-10 | Stop reason: HOSPADM

## 2025-03-10 RX ORDER — HYDROMORPHONE HYDROCHLORIDE 1 MG/ML
INJECTION, SOLUTION INTRAMUSCULAR; INTRAVENOUS; SUBCUTANEOUS AS NEEDED
Status: DISCONTINUED | OUTPATIENT
Start: 2025-03-10 | End: 2025-03-10

## 2025-03-10 RX ORDER — ONDANSETRON HYDROCHLORIDE 2 MG/ML
4 INJECTION, SOLUTION INTRAVENOUS EVERY 8 HOURS PRN
Status: DISCONTINUED | OUTPATIENT
Start: 2025-03-10 | End: 2025-03-14 | Stop reason: HOSPADM

## 2025-03-10 RX ORDER — TORSEMIDE 20 MG/1
40 TABLET ORAL DAILY PRN
Status: DISCONTINUED | OUTPATIENT
Start: 2025-03-10 | End: 2025-03-14 | Stop reason: HOSPADM

## 2025-03-10 RX ORDER — ERGOCALCIFEROL 1.25 MG/1
1250 CAPSULE ORAL DAILY
Status: COMPLETED | OUTPATIENT
Start: 2025-03-10 | End: 2025-03-11

## 2025-03-10 RX ORDER — EPINEPHRINE 1 MG/ML
INJECTION INTRAMUSCULAR; INTRAVENOUS; SUBCUTANEOUS AS NEEDED
Status: DISCONTINUED | OUTPATIENT
Start: 2025-03-10 | End: 2025-03-10

## 2025-03-10 RX ORDER — AMOXICILLIN 250 MG
2 CAPSULE ORAL 2 TIMES DAILY
Status: DISCONTINUED | OUTPATIENT
Start: 2025-03-10 | End: 2025-03-14 | Stop reason: HOSPADM

## 2025-03-10 RX ORDER — OXYCODONE HYDROCHLORIDE 5 MG/1
5 TABLET ORAL EVERY 4 HOURS PRN
Status: DISCONTINUED | OUTPATIENT
Start: 2025-03-10 | End: 2025-03-10 | Stop reason: HOSPADM

## 2025-03-10 RX ORDER — INSULIN LISPRO 100 [IU]/ML
0-5 INJECTION, SOLUTION INTRAVENOUS; SUBCUTANEOUS
Status: DISCONTINUED | OUTPATIENT
Start: 2025-03-10 | End: 2025-03-14

## 2025-03-10 RX ORDER — TRANEXAMIC ACID 100 MG/ML
INJECTION, SOLUTION INTRAVENOUS AS NEEDED
Status: DISCONTINUED | OUTPATIENT
Start: 2025-03-10 | End: 2025-03-10

## 2025-03-10 RX ORDER — HYDRALAZINE HYDROCHLORIDE 20 MG/ML
5 INJECTION INTRAMUSCULAR; INTRAVENOUS EVERY 30 MIN PRN
Status: DISCONTINUED | OUTPATIENT
Start: 2025-03-10 | End: 2025-03-10 | Stop reason: HOSPADM

## 2025-03-10 RX ORDER — FENTANYL CITRATE 50 UG/ML
INJECTION, SOLUTION INTRAMUSCULAR; INTRAVENOUS AS NEEDED
Status: DISCONTINUED | OUTPATIENT
Start: 2025-03-10 | End: 2025-03-10

## 2025-03-10 RX ORDER — PHENYLEPHRINE HCL IN 0.9% NACL 0.4MG/10ML
SYRINGE (ML) INTRAVENOUS AS NEEDED
Status: DISCONTINUED | OUTPATIENT
Start: 2025-03-10 | End: 2025-03-10

## 2025-03-10 RX ORDER — HYDRALAZINE HYDROCHLORIDE 20 MG/ML
5 INJECTION INTRAMUSCULAR; INTRAVENOUS EVERY 4 HOURS PRN
Status: DISCONTINUED | OUTPATIENT
Start: 2025-03-10 | End: 2025-03-14 | Stop reason: HOSPADM

## 2025-03-10 RX ORDER — PROPOFOL 10 MG/ML
INJECTION, EMULSION INTRAVENOUS AS NEEDED
Status: DISCONTINUED | OUTPATIENT
Start: 2025-03-10 | End: 2025-03-10

## 2025-03-10 RX ORDER — CEFAZOLIN SODIUM 2 G/100ML
2 INJECTION, SOLUTION INTRAVENOUS EVERY 8 HOURS
Status: COMPLETED | OUTPATIENT
Start: 2025-03-10 | End: 2025-03-11

## 2025-03-10 RX ORDER — ADHESIVE BANDAGE
30 BANDAGE TOPICAL DAILY PRN
Status: DISCONTINUED | OUTPATIENT
Start: 2025-03-10 | End: 2025-03-14 | Stop reason: HOSPADM

## 2025-03-10 RX ORDER — HYDRALAZINE HYDROCHLORIDE 25 MG/1
25 TABLET, FILM COATED ORAL 3 TIMES DAILY
COMMUNITY

## 2025-03-10 RX ORDER — CEFAZOLIN 1 G/1
INJECTION, POWDER, FOR SOLUTION INTRAVENOUS AS NEEDED
Status: DISCONTINUED | OUTPATIENT
Start: 2025-03-10 | End: 2025-03-10

## 2025-03-10 RX ORDER — ACETAMINOPHEN 325 MG/1
650 TABLET ORAL EVERY 6 HOURS PRN
Qty: 120 TABLET | Refills: 0 | Status: SHIPPED | OUTPATIENT
Start: 2025-03-10 | End: 2025-03-25

## 2025-03-10 RX ORDER — ISOSORBIDE MONONITRATE 30 MG/1
30 TABLET, EXTENDED RELEASE ORAL DAILY
Status: DISCONTINUED | OUTPATIENT
Start: 2025-03-10 | End: 2025-03-14 | Stop reason: HOSPADM

## 2025-03-10 RX ORDER — PSYLLIUM HUSK 0.4 G
1 CAPSULE ORAL 2 TIMES DAILY
Status: DISCONTINUED | OUTPATIENT
Start: 2025-03-10 | End: 2025-03-14 | Stop reason: HOSPADM

## 2025-03-10 RX ORDER — SODIUM CHLORIDE, SODIUM LACTATE, POTASSIUM CHLORIDE, CALCIUM CHLORIDE 600; 310; 30; 20 MG/100ML; MG/100ML; MG/100ML; MG/100ML
100 INJECTION, SOLUTION INTRAVENOUS CONTINUOUS
Status: DISCONTINUED | OUTPATIENT
Start: 2025-03-10 | End: 2025-03-10 | Stop reason: HOSPADM

## 2025-03-10 RX ORDER — LIDOCAINE HYDROCHLORIDE 10 MG/ML
0.1 INJECTION, SOLUTION INFILTRATION; PERINEURAL ONCE
Status: DISCONTINUED | OUTPATIENT
Start: 2025-03-10 | End: 2025-03-10 | Stop reason: HOSPADM

## 2025-03-10 RX ORDER — CARVEDILOL 12.5 MG/1
12.5 TABLET ORAL 2 TIMES DAILY
Status: DISCONTINUED | OUTPATIENT
Start: 2025-03-10 | End: 2025-03-14 | Stop reason: HOSPADM

## 2025-03-10 RX ORDER — HYDROMORPHONE HYDROCHLORIDE 0.2 MG/ML
0.2 INJECTION INTRAMUSCULAR; INTRAVENOUS; SUBCUTANEOUS EVERY 5 MIN PRN
Status: DISCONTINUED | OUTPATIENT
Start: 2025-03-10 | End: 2025-03-10 | Stop reason: HOSPADM

## 2025-03-10 RX ORDER — ALBUTEROL SULFATE 0.83 MG/ML
2.5 SOLUTION RESPIRATORY (INHALATION) ONCE AS NEEDED
Status: DISCONTINUED | OUTPATIENT
Start: 2025-03-10 | End: 2025-03-10 | Stop reason: HOSPADM

## 2025-03-10 RX ORDER — ONDANSETRON HYDROCHLORIDE 2 MG/ML
4 INJECTION, SOLUTION INTRAVENOUS ONCE AS NEEDED
Status: DISCONTINUED | OUTPATIENT
Start: 2025-03-10 | End: 2025-03-10 | Stop reason: HOSPADM

## 2025-03-10 RX ORDER — PROCHLORPERAZINE EDISYLATE 5 MG/ML
10 INJECTION INTRAMUSCULAR; INTRAVENOUS EVERY 6 HOURS PRN
Status: DISCONTINUED | OUTPATIENT
Start: 2025-03-10 | End: 2025-03-14 | Stop reason: HOSPADM

## 2025-03-10 RX ORDER — ATORVASTATIN CALCIUM 40 MG/1
40 TABLET, FILM COATED ORAL DAILY
Status: DISCONTINUED | OUTPATIENT
Start: 2025-03-10 | End: 2025-03-14 | Stop reason: HOSPADM

## 2025-03-10 RX ORDER — GABAPENTIN 300 MG/1
300 CAPSULE ORAL 2 TIMES DAILY
Status: DISCONTINUED | OUTPATIENT
Start: 2025-03-10 | End: 2025-03-14 | Stop reason: HOSPADM

## 2025-03-10 RX ORDER — ACETAMINOPHEN 325 MG/1
650 TABLET ORAL EVERY 6 HOURS SCHEDULED
Status: DISCONTINUED | OUTPATIENT
Start: 2025-03-10 | End: 2025-03-14 | Stop reason: HOSPADM

## 2025-03-10 RX ORDER — ONDANSETRON 4 MG/1
4 TABLET, FILM COATED ORAL EVERY 8 HOURS PRN
Status: DISCONTINUED | OUTPATIENT
Start: 2025-03-10 | End: 2025-03-14 | Stop reason: HOSPADM

## 2025-03-10 RX ORDER — ONDANSETRON HYDROCHLORIDE 2 MG/ML
INJECTION, SOLUTION INTRAVENOUS AS NEEDED
Status: DISCONTINUED | OUTPATIENT
Start: 2025-03-10 | End: 2025-03-10

## 2025-03-10 RX ADMIN — ACETAMINOPHEN 650 MG: 325 TABLET ORAL at 13:26

## 2025-03-10 RX ADMIN — CEFAZOLIN SODIUM 2 G: 2 INJECTION, SOLUTION INTRAVENOUS at 17:06

## 2025-03-10 RX ADMIN — ONDANSETRON 4 MG: 2 INJECTION INTRAMUSCULAR; INTRAVENOUS at 09:50

## 2025-03-10 RX ADMIN — ASPIRIN 81 MG: 81 TABLET, COATED ORAL at 13:32

## 2025-03-10 RX ADMIN — HYDROMORPHONE HYDROCHLORIDE 0.4 MG: 1 INJECTION, SOLUTION INTRAMUSCULAR; INTRAVENOUS; SUBCUTANEOUS at 10:24

## 2025-03-10 RX ADMIN — TRANEXAMIC ACID 1000 MG: 100 INJECTION INTRAVENOUS at 07:44

## 2025-03-10 RX ADMIN — CARVEDILOL 12.5 MG: 12.5 TABLET, FILM COATED ORAL at 13:26

## 2025-03-10 RX ADMIN — POLYETHYLENE GLYCOL 3350 17 G: 17 POWDER, FOR SOLUTION ORAL at 13:26

## 2025-03-10 RX ADMIN — HYDROMORPHONE HYDROCHLORIDE 1 MG: 1 INJECTION, SOLUTION INTRAMUSCULAR; INTRAVENOUS; SUBCUTANEOUS at 07:54

## 2025-03-10 RX ADMIN — PROPOFOL 200 MG: 10 INJECTION, EMULSION INTRAVENOUS at 07:32

## 2025-03-10 RX ADMIN — EPINEPHRINE 50 MCG: 1 INJECTION INTRAMUSCULAR; INTRAVENOUS; SUBCUTANEOUS at 10:51

## 2025-03-10 RX ADMIN — ROPIVACAINE HYDROCHLORIDE 30 ML: 5 INJECTION, SOLUTION EPIDURAL; INFILTRATION; PERINEURAL at 10:50

## 2025-03-10 RX ADMIN — METHOCARBAMOL 1000 MG: 1000 INJECTION, SOLUTION INTRAMUSCULAR; INTRAVENOUS at 12:04

## 2025-03-10 RX ADMIN — SODIUM CHLORIDE, POTASSIUM CHLORIDE, SODIUM LACTATE AND CALCIUM CHLORIDE 100 ML/HR: 600; 310; 30; 20 INJECTION, SOLUTION INTRAVENOUS at 13:27

## 2025-03-10 RX ADMIN — HYDROMORPHONE HYDROCHLORIDE 0.4 MG: 1 INJECTION, SOLUTION INTRAMUSCULAR; INTRAVENOUS; SUBCUTANEOUS at 10:57

## 2025-03-10 RX ADMIN — Medication 1 TABLET: at 13:26

## 2025-03-10 RX ADMIN — DEXAMETHASONE SODIUM PHOSPHATE 4 MG: 4 INJECTION INTRA-ARTICULAR; INTRALESIONAL; INTRAMUSCULAR; INTRAVENOUS; SOFT TISSUE at 10:51

## 2025-03-10 RX ADMIN — GABAPENTIN 300 MG: 300 CAPSULE ORAL at 20:44

## 2025-03-10 RX ADMIN — SODIUM CHLORIDE, SODIUM LACTATE, POTASSIUM CHLORIDE, AND CALCIUM CHLORIDE: 600; 310; 30; 20 INJECTION, SOLUTION INTRAVENOUS at 07:28

## 2025-03-10 RX ADMIN — Medication 2 L/MIN: at 10:15

## 2025-03-10 RX ADMIN — ERGOCALCIFEROL 1250 MCG: 1.25 CAPSULE ORAL at 13:33

## 2025-03-10 RX ADMIN — PROCHLORPERAZINE EDISYLATE 10 MG: 5 INJECTION INTRAMUSCULAR; INTRAVENOUS at 13:25

## 2025-03-10 RX ADMIN — CEFAZOLIN 2 G: 1 INJECTION, POWDER, FOR SOLUTION INTRAMUSCULAR; INTRAVENOUS at 07:44

## 2025-03-10 RX ADMIN — Medication 160 MCG: at 09:13

## 2025-03-10 RX ADMIN — Medication 1 TABLET: at 20:44

## 2025-03-10 RX ADMIN — CARVEDILOL 12.5 MG: 12.5 TABLET, FILM COATED ORAL at 20:44

## 2025-03-10 RX ADMIN — Medication 2 L/MIN: at 13:33

## 2025-03-10 RX ADMIN — ROCURONIUM BROMIDE 10 MG: 10 INJECTION INTRAVENOUS at 09:11

## 2025-03-10 RX ADMIN — ROCURONIUM BROMIDE 70 MG: 10 INJECTION INTRAVENOUS at 07:32

## 2025-03-10 RX ADMIN — ASPIRIN 81 MG: 81 TABLET, COATED ORAL at 20:44

## 2025-03-10 RX ADMIN — SENNOSIDES AND DOCUSATE SODIUM 2 TABLET: 8.6; 5 TABLET ORAL at 13:26

## 2025-03-10 RX ADMIN — POVIDONE-IODINE: 5 SOLUTION TOPICAL at 07:12

## 2025-03-10 RX ADMIN — CYCLOBENZAPRINE 10 MG: 10 TABLET, FILM COATED ORAL at 13:26

## 2025-03-10 RX ADMIN — GABAPENTIN 300 MG: 300 CAPSULE ORAL at 13:26

## 2025-03-10 RX ADMIN — SUGAMMADEX 400 MG: 100 INJECTION, SOLUTION INTRAVENOUS at 09:54

## 2025-03-10 RX ADMIN — LIDOCAINE HYDROCHLORIDE 100 MG: 20 INJECTION INTRAVENOUS at 07:32

## 2025-03-10 RX ADMIN — HYDRALAZINE HYDROCHLORIDE 5 MG: 20 INJECTION INTRAMUSCULAR; INTRAVENOUS at 07:29

## 2025-03-10 RX ADMIN — ATORVASTATIN CALCIUM 40 MG: 40 TABLET, FILM COATED ORAL at 13:26

## 2025-03-10 RX ADMIN — ISOSORBIDE MONONITRATE 30 MG: 30 TABLET, EXTENDED RELEASE ORAL at 17:06

## 2025-03-10 RX ADMIN — FENTANYL CITRATE 100 MCG: 50 INJECTION, SOLUTION INTRAMUSCULAR; INTRAVENOUS at 07:32

## 2025-03-10 SDOH — SOCIAL STABILITY: SOCIAL INSECURITY: HAS ANYONE EVER THREATENED TO HURT YOUR FAMILY OR YOUR PETS?: NO

## 2025-03-10 SDOH — SOCIAL STABILITY: SOCIAL INSECURITY: HAVE YOU HAD ANY THOUGHTS OF HARMING ANYONE ELSE?: NO

## 2025-03-10 SDOH — SOCIAL STABILITY: SOCIAL INSECURITY
ASK PARENT OR GUARDIAN: ARE THERE TIMES WHEN YOU, YOUR CHILD(REN), OR ANY MEMBER OF YOUR HOUSEHOLD FEEL UNSAFE, HARMED, OR THREATENED AROUND PERSONS WITH WHOM YOU KNOW OR LIVE?: NO

## 2025-03-10 SDOH — SOCIAL STABILITY: SOCIAL INSECURITY: ARE YOU OR HAVE YOU BEEN THREATENED OR ABUSED PHYSICALLY, EMOTIONALLY, OR SEXUALLY BY ANYONE?: NO

## 2025-03-10 SDOH — SOCIAL STABILITY: SOCIAL INSECURITY: DO YOU FEEL ANYONE HAS EXPLOITED OR TAKEN ADVANTAGE OF YOU FINANCIALLY OR OF YOUR PERSONAL PROPERTY?: NO

## 2025-03-10 SDOH — SOCIAL STABILITY: SOCIAL INSECURITY: ABUSE: ADULT

## 2025-03-10 SDOH — SOCIAL STABILITY: SOCIAL INSECURITY: HAVE YOU HAD THOUGHTS OF HARMING ANYONE ELSE?: NO

## 2025-03-10 SDOH — ECONOMIC STABILITY: HOUSING INSECURITY: DO YOU FEEL UNSAFE GOING BACK TO THE PLACE WHERE YOU LIVE?: NO

## 2025-03-10 SDOH — SOCIAL STABILITY: SOCIAL INSECURITY: DO YOU FEEL UNSAFE GOING BACK TO THE PLACE WHERE YOU ARE LIVING?: NO

## 2025-03-10 SDOH — SOCIAL STABILITY: SOCIAL INSECURITY: DOES ANYONE TRY TO KEEP YOU FROM HAVING/CONTACTING OTHER FRIENDS OR DOING THINGS OUTSIDE YOUR HOME?: NO

## 2025-03-10 SDOH — SOCIAL STABILITY: SOCIAL INSECURITY: ARE THERE ANY APPARENT SIGNS OF INJURIES/BEHAVIORS THAT COULD BE RELATED TO ABUSE/NEGLECT?: NO

## 2025-03-10 ASSESSMENT — PAIN - FUNCTIONAL ASSESSMENT
PAIN_FUNCTIONAL_ASSESSMENT: 0-10
PAIN_FUNCTIONAL_ASSESSMENT: UNABLE TO SELF-REPORT
PAIN_FUNCTIONAL_ASSESSMENT: 0-10
PAIN_FUNCTIONAL_ASSESSMENT: 0-10
PAIN_FUNCTIONAL_ASSESSMENT: UNABLE TO SELF-REPORT
PAIN_FUNCTIONAL_ASSESSMENT: 0-10
PAIN_FUNCTIONAL_ASSESSMENT: 0-10
PAIN_FUNCTIONAL_ASSESSMENT: UNABLE TO SELF-REPORT
PAIN_FUNCTIONAL_ASSESSMENT: 0-10
PAIN_FUNCTIONAL_ASSESSMENT: UNABLE TO SELF-REPORT
PAIN_FUNCTIONAL_ASSESSMENT: UNABLE TO SELF-REPORT
PAIN_FUNCTIONAL_ASSESSMENT: 0-10

## 2025-03-10 ASSESSMENT — COGNITIVE AND FUNCTIONAL STATUS - GENERAL
DRESSING REGULAR LOWER BODY CLOTHING: A LITTLE
MOVING FROM LYING ON BACK TO SITTING ON SIDE OF FLAT BED WITH BEDRAILS: A LITTLE
MOVING TO AND FROM BED TO CHAIR: A LITTLE
DRESSING REGULAR UPPER BODY CLOTHING: A LITTLE
DAILY ACTIVITIY SCORE: 22
CLIMB 3 TO 5 STEPS WITH RAILING: A LOT
TOILETING: A LITTLE
PATIENT BASELINE BEDBOUND: NO
DAILY ACTIVITIY SCORE: 21
TURNING FROM BACK TO SIDE WHILE IN FLAT BAD: A LITTLE
STANDING UP FROM CHAIR USING ARMS: A LITTLE
TURNING FROM BACK TO SIDE WHILE IN FLAT BAD: A LITTLE
CLIMB 3 TO 5 STEPS WITH RAILING: A LOT
MOVING TO AND FROM BED TO CHAIR: A LITTLE
WALKING IN HOSPITAL ROOM: A LITTLE
MOBILITY SCORE: 17
DRESSING REGULAR LOWER BODY CLOTHING: A LITTLE
DRESSING REGULAR UPPER BODY CLOTHING: A LITTLE
MOVING FROM LYING ON BACK TO SITTING ON SIDE OF FLAT BED WITH BEDRAILS: A LITTLE
STANDING UP FROM CHAIR USING ARMS: A LITTLE
MOBILITY SCORE: 17
WALKING IN HOSPITAL ROOM: A LITTLE

## 2025-03-10 ASSESSMENT — ACTIVITIES OF DAILY LIVING (ADL)
GROOMING: INDEPENDENT
WALKS IN HOME: NEEDS ASSISTANCE
DRESSING YOURSELF: INDEPENDENT
ADEQUATE_TO_COMPLETE_ADL: YES
FEEDING YOURSELF: INDEPENDENT
PATIENT'S MEMORY ADEQUATE TO SAFELY COMPLETE DAILY ACTIVITIES?: YES
ASSISTIVE_DEVICE: EYEGLASSES;CANE;WALKER
HEARING - LEFT EAR: FUNCTIONAL
BATHING: INDEPENDENT
TOILETING: INDEPENDENT
HEARING - RIGHT EAR: FUNCTIONAL
JUDGMENT_ADEQUATE_SAFELY_COMPLETE_DAILY_ACTIVITIES: YES

## 2025-03-10 ASSESSMENT — PAIN SCALES - GENERAL
PAINLEVEL_OUTOF10: 7
PAINLEVEL_OUTOF10: 6
PAINLEVEL_OUTOF10: 7
PAINLEVEL_OUTOF10: 7
PAINLEVEL_OUTOF10: 5 - MODERATE PAIN
PAINLEVEL_OUTOF10: 0 - NO PAIN
PAINLEVEL_OUTOF10: 7
PAINLEVEL_OUTOF10: 7
PAINLEVEL_OUTOF10: 0 - NO PAIN
PAINLEVEL_OUTOF10: 7

## 2025-03-10 ASSESSMENT — PAIN SCALES - WONG BAKER: WONGBAKER_NUMERICALRESPONSE: HURTS LITTLE BIT

## 2025-03-10 ASSESSMENT — PAIN DESCRIPTION - ORIENTATION: ORIENTATION: LEFT

## 2025-03-10 ASSESSMENT — PATIENT HEALTH QUESTIONNAIRE - PHQ9
1. LITTLE INTEREST OR PLEASURE IN DOING THINGS: NOT AT ALL
SUM OF ALL RESPONSES TO PHQ9 QUESTIONS 1 & 2: 0
2. FEELING DOWN, DEPRESSED OR HOPELESS: NOT AT ALL

## 2025-03-10 ASSESSMENT — LIFESTYLE VARIABLES
AUDIT-C TOTAL SCORE: 0
AUDIT-C TOTAL SCORE: 0
HOW OFTEN DO YOU HAVE A DRINK CONTAINING ALCOHOL: NEVER
PRESCIPTION_ABUSE_PAST_12_MONTHS: NO
SKIP TO QUESTIONS 9-10: 1
HOW MANY STANDARD DRINKS CONTAINING ALCOHOL DO YOU HAVE ON A TYPICAL DAY: 1 OR 2
SUBSTANCE_ABUSE_PAST_12_MONTHS: NO
HOW OFTEN DO YOU HAVE 6 OR MORE DRINKS ON ONE OCCASION: NEVER

## 2025-03-10 ASSESSMENT — PAIN DESCRIPTION - LOCATION: LOCATION: NECK

## 2025-03-10 NOTE — PROGRESS NOTES
Pharmacy Medication History Review    Nuris Squires is a 59 y.o. female admitted for Closed bicondylar fracture of distal femur, left, with nonunion, subsequent encounter. Pharmacy reviewed the patient's [id number]-[id number] medications for accuracy.    Medications ADDED:  none  Medications CHANGED:  Hydralazine to 25mg tablet, 1 tablet 3 times a day  Medications REMOVED:   Albuterol, folic acid, oxybutynin     The list below reflects the updated PTA list.   Prior to Admission Medications   Prescriptions Last Dose Informant   B complex-vitamin C-folic acid (Nephro-Duke) 0.8 mg tablet 3/9/2025 Self   Sig: Take 1 tablet by mouth once daily.   FreeStyle Remington 3 [place] misc 3/9/2025 Self   Sig: Use as instructed to monitor blood glucose.   OneTouch Delica Plus Lancet 30 gauge misc 3/9/2025 Self   Sig: USE TO TEST BLOOD SUGAR AS DIRECTED   OneTouch Ultra Test strip 3/9/2025 Self   Sig: Use 1 strip BID to check glucose   OneTouch Ultra2 Meter misc 3/9/2025 Self   Sig: use 1 to 2 times daily   acetaminophen (Tylenol) 325 mg tablet Unknown    Sig: Take 2 tablets (650 mg) by mouth every 4 hours if needed for mild pain (1 - 3).   aspirin 81 mg EC tablet 3/9/2025 Self   Sig: Take 1 tablet (81 mg) by mouth once daily.   atorvastatin (Lipitor) 40 mg tablet 3/9/2025 Self   Sig: Take 1 tablet (40 mg) by mouth once daily.   blood-glucose sensor (FreeStyle Remington 3 Sensor) device 3/9/2025 Self   Sig: Use to monitor blood glucose. Change sensor every 14 days.   carvedilol (Coreg) 12.5 mg tablet 3/10/2025 Morning Self   Sig: Take 1 tablet (12.5 mg) by mouth 2 times a day.   ferrous sulfate tablet 3/9/2025 Self   Sig: Take 1 tablet (325 mg) by mouth once daily with breakfast.   gabapentin (Neurontin) 300 mg capsule 3/9/2025 Self   Sig: Take 1 capsule (300 mg) by mouth 2 times a day.   hydrALAZINE (Apresoline) 25 mg tablet  Self   Sig: Take 1 tablet (25 mg) by mouth 3 times a day.   insulin glargine (Basaglar KwikPen U-100 Insulin) 100  "unit/mL (3 mL) pen 3/9/2025 Self   Sig: Inject 15 Units under the skin once daily in the morning. Take as directed per insulin instructions.   insulin lispro (Admelog SoloStar U-100 Insulin) 100 unit/mL injection 3/9/2025 Self   Sig: Inject per sliding scale instructions under the skin 3 times a day with meals. (Max daily dose 70 units)   isosorbide mononitrate ER (Imdur) 30 mg 24 hr tablet 3/9/2025 Self   Sig: Take 1 tablet (30 mg) by mouth once daily. Do not crush or chew.   pen needle, diabetic 32 gauge x 5/32\" needle 3/9/2025 Self   Sig: Use four times a day with insulin use   tirzepatide (Mounjaro) 2.5 mg/0.5 mL pen injector Past Week Self   Sig: Inject 2.5 mg under the skin every 7 days.   torsemide (Demadex) 20 mg tablet 3/9/2025 Self   Sig: Take 2 tablets (40 mg) by mouth once daily as needed (Please take one tab daily if you experience any of the following: increased shortness of breath, increased swelling of your lower extremities, or sudden weight gain of 3lbs or more in a few days, or 5lbs or more in a week).      Facility-Administered Medications: None       Patient accepts M2B at discharge.     Sources:   [facility]  Pharmacy dispense history  Patient interview Good historian  Chart Review  Care Everywhere  PCP OV 2/20/25    Additional Comments:  Hydralazine 25mg tablet, 1 tablet 3 times a day-  On 3/10/25 patient reports she has not taken for a few weeks.  Needs refill.  Last pharmacy fill 1/23/25 for #90 tablets / 30 day supply      Pako Burton, PharmD  Transitions of Care Pharmacist  03/10/25     Secure Chat preferred   If no response call [phone] or fitkit \"Med Rec\"    "

## 2025-03-10 NOTE — OP NOTE
REPAIR, FRACTURE NONUNION, FEMUR (L) Operative Note     Date: 3/10/2025  OR Location: [facility] OR    Name: Nuris Squires, : 1965, Age: 59 y.o., MRN: [id number], Sex: female    Diagnosis  Pre-op Diagnosis      * Closed bicondylar fracture of distal femur, left, with nonunion, subsequent encounter [S72.422K, S72.432K] Post-op Diagnosis     * Closed bicondylar fracture of distal femur, left, with nonunion, subsequent encounter [S72.422K, S72.432K]     Procedures  REPAIR, FRACTURE NONUNION, FEMUR  I52371 - MA FIX NON/MALUNION FEMUR BELOW NECK    Removal of hardware Left Femur    Exchange Nail Left Femur    Bonegrafting Left Femur bone marrow aspirate    Surgeons      * Barrera Rodriguez - Primary    Resident/Fellow/Other Assistant:  Surgeons and Role:     * Jessica Nation MD - Resident - Assisting     * Tylor Charles MD - Resident - Assisting    Staff:   Circulator: Frank Hester Person: Evan Hester Person: Abby    Anesthesia Staff: Anesthesiologist: Aurora James MD; Eliz Cho MD  CRNA: TAL Figueroa-CRNA    Procedure Summary  Anesthesia: General  ASA: III  Estimated Blood Loss: 100mL  Intra-op Medications:   Administrations occurring from 0700 to 1005 on 03/10/25:   Medication Name Total Dose   sodium chloride 0.9 % irrigation solution 1,000 mL   povidone-iodine 5 % kit kit Cannot be calculated   ceFAZolin (Ancef) vial 1 g 2 g   fentaNYL (Sublimaze) injection 50 mcg/mL 100 mcg   hydrALAZINE (Apresoline) injection 5 mg   HYDROmorphone (Dilaudid) injection 1 mg/mL 1 mg   LR bolus Cannot be calculated   lidocaine (cardiac) injection 2% prefilled syringe 100 mg   ondansetron (Zofran) 2 mg/mL injection 4 mg   phenylephrine 40 mcg/mL syringe 10 mL 160 mcg   propofol (Diprivan) injection 10 mg/mL 200 mg   rocuronium (ZeMuron) 50 mg/5 mL injection 80 mg   sugammadex (Bridion) 200 mg/2 mL injection 400 mg   tranexamic acid (Cyklokapron) injection 1,000 mg              Anesthesia Record                Intraprocedure I/O Totals          Intake    LR bolus 700.00 mL    Tranexamic Acid 0.00 mL    The total shown is the total volume documented since Anesthesia Start was filed.    Total Intake 700 mL       Output    Est. Blood Loss 50 mL    Total Output 50 mL       Net    Net Volume 650 mL          Specimen: No specimens collected              Drains and/or Catheters:   Urethral Catheter (Active)   Site Assessment Clean 03/10/25 0634       Tourniquet Times:         Implants:  Implants       Type Name Action Serial No.      Joint NAIL, T2 ALPHA FEMUR RETROGRADE, [id number] - SNA - [id number] Implanted NA     Joint GUIDE WIRE, FARHAT, 3.0MM X 1000MM - SNA - [id number] Used, Not Implanted NA     Screw K-WIRE, 3 X 285MM - SNA - [id number] Used, Not Implanted NA     Screw SCREW, ADVANCED LOCKING, 5 X 75MM - SNA - [id number] Implanted NA     Screw SCREW, ADVANCED LOCKING, 5 X 60MM - SNA - [id number] Implanted NA     Screw SCREW, ADVANCED LOCKING, 5 X 65MM - SNA - [id number] Implanted NA     Screw SCREW, ADVANCED LOCKING, 5 X 65MM - SNA - [id number] Implanted NA     Screw SCREW, LOCKING, 5 X 35MM - SNA - [id number] Implanted NA     Screw SCREW, ADVANCED LOCKING, 5 X 35MM - SNA - [id number] Implanted NA     Graft KIT, IGNITE POWER MIX W DBM 4CC - [id number] - [id number] Implanted [id number]     Screw END CAP, SCN, FULLY THREADED, T2 - SNA - [id number] Implanted NA              Findings: L distal femur hypertrophic non-union    Indications: Nuris Squires is an 59 y.o. female who is having surgery for Closed bicondylar fracture of distal femur, left, with nonunion, subsequent encounter [S77.422K, S72.432K].     The patient was seen in the preoperative area. The risks, benefits, complications, treatment options, non-operative alternatives, expected recovery and outcomes were discussed with the patient. The possibilities of reaction to medication, pulmonary aspiration, injury to surrounding structures, bleeding, recurrent  infection, the need for additional procedures, failure to diagnose a condition, and creating a complication requiring transfusion or operation were discussed with the patient. The patient concurred with the proposed plan, giving informed consent.  The site of surgery was properly noted/marked if necessary per policy. The patient has been actively warmed in preoperative area. Preoperative antibiotics have been ordered and given within 1 hours of incision. Venous thrombosis prophylaxis have been ordered including unilateral sequential compression device    Procedure Details:   The patient was then transferred to the operating room suite and mobilized from the [facility] to the operating room table. A time-out was performed to confirm the patient's identity and the correct laterality of surgery. Anesthesia was administered without complication.    The patient was then prepped and draped in the usual sterile fashion. Appropriate pre-operative antibiotics were administered. TXA was administered.     We first turned our attention to the proximal femur..  Incision was carried out utilizing prior incision and tonsils were used to release fascia overlying bone.  Fluoroscopy was then brought in.  We able to remove to 2 proximal screws.  We then turned our attention distally.  The 2 oblique screws were then removed along with the 2 transverse screws.  Guidewire was then placed to find the original entry site.  This was then reamed.  The endcap locking screw was then removed.  The nail was then threaded with the extractor and nail was malleted out.  Balltip guidewire was then passed.  We started by reaming with a 10 5 and sequentially reamed up to 13.5 reamer.  A 12 x 340 mm nail was then selected and passed.  We used the distal targeting out regular to place the 4 distal interlock screws.  We then placed 2 proximal lock screws using the proximal targeter. Endcap was placed. The incision sites were then irrigated and  closed with 0 PDS for the tendon and 2-0 Monocryl for remainder of the sites.  Skin was then closed with staples.  We then turned our attention to the nonunion site.  We first passed cannulated for Matthew ignite kit into the nonunion site.  We then withdrew marrow from the proximal tibia from the medial face to mix that with ignite kit.  Patient was then placed into the distal femur nonunion site and active flow.  The sites were also nonstapled.  The wounds were then dressed with Mepilex dressing.  A sterile wrap was also applied.  Patient was then awoken from anesthesia and transferred to recovery without any immediate complications.    Complications:  None; patient tolerated the procedure well.    Disposition: PACU - hemodynamically stable.  Condition: stable     Additional Details:   The patient may weight-bear as tolerated to left lower extremity.  They will be admitted to the orthopedic service.  They will work with physical therapy and Occupational Therapy while in house.  They will receive 24 hours of postoperative antibiotics and 4 weeks of aspirin for DVT prophylaxis.  They will follow-up in clinic in 3 weeks for repeat evaluation, staple removal and repeat x-ray left femur (AP and lateral).    Attending Attestation: I was present and scrubbed for the entire procedure.    Barrera Rodriguez  Phone Number: [phone]

## 2025-03-10 NOTE — ANESTHESIA PREPROCEDURE EVALUATION
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   (+) Mixed hyperlipidemia   (+) Peripheral arterial disease (CMS-HCC)   (+) Peripheral vascular disease (CMS-HCC)      Pulmonary   (+) Dyspnea on exertion   (+) History of home oxygen therapy      /Renal   (+) Chronic renal insufficiency      Endocrine   (+) Autonomic neuropathy due to type 2 diabetes mellitus (Multi)   (+) Diabetic autonomic neuropathy associated with type 2 diabetes mellitus (Multi)   (+) Diabetic nephropathy (Multi)   (+) Morbid obesity (Multi)   (+) Morbid obesity with body mass index (BMI) of 40.0 or higher (Multi)   (+) 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, with long-term current use of insulin   (+) Type 2 diabetes mellitus without complication (Multi)   (+) Type 2 diabetes mellitus without complications (Multi)      Hematology   (+) Anemia      Musculoskeletal   (+) Chronic left-sided low back pain with left-sided sciatica   (+) Closed bicondylar fracture of distal femur, left, with nonunion, subsequent encounter      ID   (+) Candidiasis of vagina   (+) Dermatophytosis of nail       Clinical information reviewed:   Tobacco  Allergies  Meds   Med Hx  Surg Hx  OB Status  Fam Hx  Soc   Hx        NPO Detail:  NPO/Void Status  Carbohydrate Drink Given Prior to Surgery? : N  Date of Last Liquid: 03/09/25  Time of Last Liquid: 0000  Date of Last Solid: 03/09/25  Time of Last Solid: 0000  Last Intake Type: Clear fluids  Time of Last Void: 0618         Physical Exam    Airway  Mallampati: III  TM distance: >3  FB  Neck ROM: full     Cardiovascular   Rhythm: regular  Rate: normal     Dental    Pulmonary   Breath sounds clear to auscultation     Abdominal          Results for orders placed during the hospital encounter of 01/02/25    Transthoracic Echo (TTE) Complete    Narrative  [facility], [address] [place] [address]  Tel [phone] and Fax [phone]    TRANSTHORACIC ECHOCARDIOGRAM REPORT      Patient Name:       ALBERT RIDLEY      Reading Physician:    [id number] Ninfa Pastor MD  Study Date:         1/3/2025            Ordering Provider:    [id number] KAR ANTHONY  MRN/PID:            [id number]            Fellow:               [id number] Abby Hess MD  Accession#:         [id number]        Nurse:  Date of Birth/Age:  1965 / 59      Sonographer:          Cassie Chopra RCS  years  Gender assigned at  F                   Additional Staff:  Birth:  Height:             157.48 cm           Admit Date:  Weight:             107.05 kg           Admission Status:     Inpatient -  Routine  BSA / BMI:          2.05 m2 / 43.16     Encounter#:           [id number]  kg/m2  Blood Pressure:     147/83 mmHg         Department Location:  [facility]  Non Invasive    Study Type:    TRANSTHORACIC ECHO (TTE) COMPLETE  Diagnosis/ICD: Acute on chronic diastolic (congestive) heart failure  (CHF)-I50.33  Indication:    ADHF  CPT Code:      Echo Complete w Full Doppler-30707    Patient History:  Diabetes:          Yes  Pertinent History: Hyperlipidemia and PVD. ADHF, PAD.    Study Detail: The following Echo studies were performed: 2D, M-Mode, Doppler and  color flow. Technically challenging study due to body habitus.  Definity used as a contrast agent for endocardial border  definition. Total contrast used for this procedure was 2 mL via IV  push.      PHYSICIAN INTERPRETATION:  Left Ventricle: Left ventricular ejection fraction is normal, calculated by Argueta's biplane at 69%. There are no regional left  ventricular wall motion abnormalities. The left ventricular cavity size is normal. There is mildly increased septal and mildly increased posterior left ventricular wall thickness. There is left ventricular concentric remodeling. Spectral Doppler shows a Grade II (pseudonormal pattern) of left ventricular diastolic filling with an elevated left atrial pressure.  Left Atrium: The left atrium is moderately dilated.  Right Ventricle: The right ventricle is normal in size. There is normal right ventricular global systolic function.  Right Atrium: The right atrium is mildly dilated.  Aortic Valve: The aortic valve is trileaflet. There is minimal aortic valve cusp calcification. There is no evidence of aortic valve regurgitation. The peak instantaneous gradient of the aortic valve is 11 mmHg.  Mitral Valve: The mitral valve is normal in structure. There is moderate mitral valve regurgitation which is posteriorly directed. The mitral regurgitant orifice area is 31 mm2. The mitral regurgitant volume is 55.50 ml. Mechanism of MR Is possibly restricted motion of the posterior leaflet.  Tricuspid Valve: The tricuspid valve is structurally normal. There is moderate tricuspid regurgitation. The Doppler estimated RVSP is moderately elevated at 45.2 mmHg.  Pulmonic Valve: The pulmonic valve is structurally normal. There is physiologic pulmonic valve regurgitation.  Pericardium: Small pericardial effusion. The effusion is circumferential.  Pleural: There is a small left pleural effusion.  Aorta: The aortic root is normal. The Ao Sinus is 2.90 cm. The Asc Ao is 3.30 cm.  Systemic Veins: The inferior vena cava appears normal in size, with IVC inspiratory collapse greater than 50%.  In comparison to the previous echocardiogram(s): Compared with study dated 1/25/2024, the degree of MR appears moderate today rather than mild on prior study. TR is also worsened.      CONCLUSIONS:  1. Left ventricular ejection fraction is normal,  calculated by Argueta's biplane at 69%.  2. Spectral Doppler shows a Grade II (pseudonormal pattern) of left ventricular diastolic filling with an elevated left atrial pressure.  3. There is normal right ventricular global systolic function.  4. The left atrium is moderately dilated.  5. Moderate mitral valve regurgitation.  6. Mechanism of MR Is possibly restricted motion of the posterior leaflet.  7. Moderate tricuspid regurgitation visualized.  8. Moderately elevated right ventricular systolic pressure.  9. Small pericardial effusion.  10. Compared with study dated 1/25/2024, the degree of MR appears moderate today rather than mild on prior study. TR is also worsened.    QUANTITATIVE DATA SUMMARY:    2D MEASUREMENTS:          Normal Ranges:  LAs:             3.32 cm  (2.7-4.0cm)  RVIDd:           2.76 cm  (0.9-3.6cm)  IVSd:            1.10 cm  (0.6-1.1cm)  LVPWd:           0.96 cm  (0.6-1.1cm)  LVIDd:           4.41 cm  (3.9-5.9cm)  LVIDs:           2.83 cm  LV Mass Index:   76 g/m2  LVEDV Index:     44 ml/m2  LV % FS          35.8 %      LA VOLUME:                    Normal Ranges:  LA Vol A4C:        88.4 ml    (22+/-6mL/m2)  LA Vol A2C:        79.6 ml  LA Vol BP:         85.3 ml  LA Vol Index A4C:  43.1ml/m2  LA Vol Index A2C:  38.8 ml/m2  LA Vol Index BP:   41.6 ml/m2  LA Area A4C:       25.6 cm2  LA Area A2C:       23.9 cm2  LA Major Axis A4C: 6.3 cm  LA Major Axis A2C: 6.1 cm  LA Volume Index:   35.2 ml/m2      RA VOLUME BY A/L METHOD:            Normal Ranges:  RA Vol A4C:              73.5 ml    (8.3-19.5ml)  RA Vol Index A4C:        35.8 ml/m2  RA Area A4C:             21.0 cm2  RA Major Axis A4C:       5.1 cm      AORTA MEASUREMENTS:         Normal Ranges:  Ao Sinus, d:        2.90 cm (2.1-3.5cm)  Asc Ao, d:          3.30 cm (2.1-3.4cm)      LV SYSTOLIC FUNCTION BY 2D PLANIMETRY (MOD):  Normal Ranges:  EF-A4C View:    75 % (>=55%)  EF-A2C View:    66 %  EF-Biplane:     69 %  LV EF Reported: 69 %      LV  DIASTOLIC FUNCTION:             Normal Ranges:  MV Peak E:             1.19 m/s    (0.7-1.2 m/s)  MV Peak A:             1.00 m/s    (0.42-0.7 m/s)  E/A Ratio:             1.19        (1.0-2.2)  MV e'                  0.040 m/s   (>8.0)  MV lateral e'          0.05 m/s  MV medial e'           0.03 m/s  MV A Dur:              183.39 msec  E/e' Ratio:            29.68       (<8.0)  PulmV Sys Molina:         49.18 cm/s  PulmV Robles Molina:        57.26 cm/s  PulmV S/D Molina:         0.86  PulmV A Revs Molina:      21.81 cm/s  PulmV A Revs Dur:      114.19 msec      MITRAL VALVE:          Normal Ranges:  MV DT:        232 msec (150-240msec)      MITRAL INSUFFICIENCY:             Normal Ranges:  MR VTI:               180.58 cm  MR Vmax:              523.70 cm/s  MR Volume:            55.50 ml  MR Flow Rt:           160.96 ml/s  MR EROA:              31 mm2      AORTIC VALVE:            Normal Ranges:  AoV Vmax:      1.68 m/s  (<=1.7m/s)  AoV Peak P.3 mmHg (<20mmHg)  LVOT Max Molina:  1.33 m/s  (<=1.1m/s)  LVOT VTI:      26.11 cm  LVOT Diameter: 1.79 cm   (1.8-2.4cm)  AoV Area,Vmax: 2.00 cm2  (2.5-4.5cm2)      RIGHT VENTRICLE:  RV Basal 3.50 cm  RV Mid   2.80 cm  RV Major 7.1 cm  TAPSE:   25.0 mm  RV s'    0.10 m/s      TRICUSPID VALVE/RVSP:          Normal Ranges:  Peak TR Velocity:     3.25 m/s  Est. RA Pressure:     3 mmHg  RV Syst Pressure:     45 mmHg  (< 30mmHg)  IVC Diam:             1.90 cm      PULMONIC VALVE:          Normal Ranges:  PV Accel Time:  131 msec (>120ms)  PV Max Molina:     1.0 m/s  (0.6-0.9m/s)  PV Max PG:      3.8 mmHg      Pulmonary Veins:  PulmV A Revs Dur: 114.19 msec  PulmV A Revs Molina: 21.81 cm/s  PulmV Robles Molina:   57.26 cm/s  PulmV S/D Molina:    0.86  PulmV Sys Molina:    49.18 cm/s      AORTA:  Asc Ao Diam 3.29 cm      Anesthesia Plan    History of general anesthesia?: yes  History of complications of general anesthesia?: no    ASA 3     general   (Preop bp 225/91. Very nervous. She took her carvedilol but  not imdur. Repeat bp 192/90. APS to do block postop.      GA ETT  PIV large bore)  intravenous induction   Anesthetic plan and risks discussed with patient and spouse.  Use of blood products discussed with patient who consented to blood products.

## 2025-03-10 NOTE — DISCHARGE INSTRUCTIONS
Follow-Up Instructions  You will need to be seen in clinic by Dr. Rodriguez in 3 weeks for a post-operative evaluation.      You will need to call and schedule an appointment, unless there is a previous appointment that appears on your discharge instructions.  The direct orthopaedic clinic appointment line phone number is [phone].  Please do not delay in calling to make this appointment.    You should also follow up with your primary care provider in 1-2 weeks.    Activity Restrictions  1) No driving until further instructed by your orthopaedic physician, which will be addressed at your outpatient appointments.    2) No driving or operating heavy machinery while taking narcotic pain medication.    3) Weight bearing status --> weightbearing as tolerated.     Discharge Medications  You have been sent home with the following home medications: Oxycodone, Colace, and Aspirin.  Please wean yourself off the oxycodone, as tolerated. A good time to take the medication is before physical therapy sessions and bedtime. Colace is a stool softener to reduce the narcotic pain medications cause. Take it twice a day while taking narcotic pain medication to ensure you maintain your regular bowel movement frequency. Baby aspirin is taken twice daily to help reduce your risk of blood clots.    You should also take tylenol 650mg every 6 hours as needed to reduce the amount of oxycodone you need for pain.    Wound care instructions:   1) Leave operative dressing in place until 7 days after surgery (3/17). Then remove and leave incision open to air. Let water run freely over incision when showering, do not scrub. Do not soak in pool or tub.    2) Call if any drainage after 7 days, increased redness/warmth/swelling at incision site, abnormal pain/tenderness of the extremity, abnormal swelling of the extremity that does not respond to elevation, SOB/chest pain.

## 2025-03-10 NOTE — CARE PLAN
The patient's goals for the shift include pain control    The clinical goals for the shift include pain control      Problem: Pain - Adult  Goal: Verbalizes/displays adequate comfort level or baseline comfort level  Outcome: Progressing     Problem: Safety - Adult  Goal: Free from fall injury  Outcome: Progressing     Problem: Discharge Planning  Goal: Discharge to home or other facility with appropriate resources  Outcome: Progressing     Problem: Chronic Conditions and Co-morbidities  Goal: Patient's chronic conditions and co-morbidity symptoms are monitored and maintained or improved  Outcome: Progressing     Problem: Nutrition  Goal: Nutrient intake appropriate for maintaining nutritional needs  Outcome: Progressing

## 2025-03-10 NOTE — ANESTHESIA PROCEDURE NOTES
Airway  Date/Time: 3/10/2025 7:52 AM  Urgency: elective    Airway not difficult    Staffing  Performed: CRNA   Authorized by: Aurora James MD    Performed by: TAL Figueroa-TATYANA  Patient location during procedure: OR    Indications and Patient Condition  Indications for airway management: anesthesia and airway protection  Spontaneous ventilation: present  Sedation level: deep  Preoxygenated: yes  Patient position: sniffing  MILS not maintained throughout  Mask difficulty assessment: 1 - vent by mask  Planned trial extubation    Final Airway Details  Final airway type: endotracheal airway      Successful airway: ETT  Cuffed: yes   Successful intubation technique: direct laryngoscopy  Facilitating devices/methods: intubating stylet  Endotracheal tube insertion site: oral  Blade: Lien  Blade size: #3  ETT size (mm): 7.0  Cormack-Lehane Classification: grade I - full view of glottis  Placement verified by: chest auscultation and capnometry   Cuff volume (mL): 10  Measured from: teeth  ETT to teeth (cm): 21  Number of attempts at approach: 1  Ventilation between attempts: BVM  Number of other approaches attempted: 0    Additional Comments  Ett taped

## 2025-03-10 NOTE — PROCEDURES
Peripheral Block    Patient location during procedure: pre-op  Start time: 3/10/2025 10:33 AM  End time: 3/10/2025 10:55 AM  Reason for block: at surgeon's request and post-op pain management  Staffing  Performed: resident and attending   Authorized by: Malik Lau MD    Performed by: Maria Victoria Palacio MD  Preanesthetic Checklist  Completed: patient identified, IV checked, site marked, risks and benefits discussed, surgical consent, monitors and equipment checked, pre-op evaluation and timeout performed   Timeout performed at: 3/10/2025 10:37 AM  Peripheral Block  Patient position: laying flat  Prep: ChloraPrep  Patient monitoring: heart rate, continuous pulse ox and cardiac monitor  Block type: fascia iliaca  Laterality: left  Injection technique: single-shot  Guidance: ultrasound guided  Local infiltration: lidocaine  Infiltration strength: 1 %  Dose: 3 mL  Needle  Needle type: Quincke   Needle gauge: 22 G  Needle length: 8 cm  Needle localization: ultrasound guidance     image stored in chart  Assessment  Injection assessment: negative aspiration for heme, no paresthesia on injection, incremental injection and local visualized surrounding nerve on ultrasound  Additional Notes  Fascia iliaca block:     Prior to procedure: Following a focused history, procedure-related and patient-specific complications were discussed. Risks, benefits, and alternatives were explained. Informed, written consent was provided by the patient and/or surrogate decision maker for the block. Anticoagulation (if any) was held per BALBINA guidelines. ASA monitors were applied. Patient was positioned, prepped with chlorhexidine, and draped with sterile towels.     Ultrasound guidance was used to visualize the femoral nerve and femoral artery. Ultrasound probe moved laterally until the fascia barbara and fascia iliaca visualized. Skin was numbed with 1% lidocaine. Needle was inserted and advanced towards target with visualization of the  needle throughout duration of the procedure. A total of 30 cc of 0.25% ropivacaine, dexamethasone 4mg, and 1:200,000 epinephrine, was divided and injected unilaterally. Patient tolerated procedure well.    Timeout by Lilia CARRILLO

## 2025-03-10 NOTE — ANESTHESIA POSTPROCEDURE EVALUATION
Patient: Nuris Squires    Procedure Summary       Date: 03/10/25 Room / Location: [facility] OR 08 / [facility] OR    Anesthesia Start: 0728 Anesthesia Stop: 1018    Procedure: REPAIR, FRACTURE NONUNION, FEMUR (Left: Leg Lower) Diagnosis:       Closed bicondylar fracture of distal femur, left, with nonunion, subsequent encounter      (Closed bicondylar fracture of distal femur, left, with nonunion, subsequent encounter [S72.422K, S72.432K])    Surgeons: Barrera Rodriguez MD Responsible Provider: Eliz Cho MD    Anesthesia Type: general ASA Status: 3            Anesthesia Type: general    Vitals Value Taken Time   /62 03/10/25 1055   Temp 36 °C (96.8 °F) 03/10/25 1015   Pulse 62 03/10/25 1058   Resp 0 03/10/25 1054   SpO2 97 % 03/10/25 1058   Vitals shown include unfiled device data.    Anesthesia Post Evaluation    Patient location during evaluation: PACU  Patient participation: complete - patient participated  Level of consciousness: awake  Pain management: adequate  Airway patency: patent  Cardiovascular status: acceptable  Respiratory status: acceptable  Hydration status: acceptable  Postoperative Nausea and Vomiting: none    There were no known notable events for this encounter.

## 2025-03-10 NOTE — CONSULTS
Consults  Acute Pain Service    Nuris Squires is a 59 y.o. year old female patient who presents for repair nonunion of left femr with Dr. Rodriguez on 3/10. Acute Pain consulted for block for postoperative pain control.     Anticipated Postop Pain Issues -   Palliative: typically relieved with IV analgesics and regional local anesthetics  Provocative: typically with movement  Quality: typically burning and aching  Radiation: typically none  Severity: typically severe 8-10/10  Timing: typically constant    Past Medical History:   Diagnosis Date    Personal history of other diseases of the circulatory system     History of hypertension    Personal history of other endocrine, nutritional and metabolic disease     History of hyperlipidemia    Personal history of other endocrine, nutritional and metabolic disease     History of diabetes mellitus        Past Surgical History:   Procedure Laterality Date    CARDIAC CATHETERIZATION N/A 1/8/2025    Procedure: Right Heart Cath;  Surgeon: Migel Stanton MD;  Location: [place] [address] Cardiac Cath Lab;  Service: Cardiovascular;  Laterality: N/A;    CARDIAC CATHETERIZATION N/A 1/17/2025    Procedure: Right Heart Cath;  Surgeon: Ana Lea MD;  Location: [place] [address] Cardiac Cath Lab;  Service: Cardiovascular;  Laterality: N/A;    CT ANGIO NECK  1/25/2023    CT NECK ANGIO W AND WO IV CONTRAST 1/25/2023 [facility]CTOR [facility]ICE [facility]GAC[facility]    CT HEAD ANGIO W AND WO IV CONTRAST  1/25/2023    CT HEAD ANGIO W AND WO IV CONTRAST 1/25/2023 DOCTOR OFFICE LEGACY    OTHER SURGICAL HISTORY  10/21/2020    Toe amputation        Family History   Problem Relation Name Age of Onset    Alzheimer's disease Mother      Diabetes Mother      Lung cancer Mother      Diabetes Father      Lung cancer Father      Pancreatic cancer Father      Diabetes Sister      Lung cancer Sister          Social History     Socioeconomic History    Marital status: Single     Spouse name: Not on file    Number of children: Not on  file    Years of education: Not on file    Highest education level: Not on file   Occupational History    Not on file   Tobacco Use    Smoking status: Never     Passive exposure: Never    Smokeless tobacco: Never   Vaping Use    Vaping status: Never Used   Substance and Sexual Activity    Alcohol use: Yes    Drug use: Never    Sexual activity: Defer   Other Topics Concern    Not on file   Social History Narrative    Not on file     Social Drivers of Health     Financial Resource Strain: Low Risk  (1/13/2025)    Overall Financial Resource Strain (CARDIA)     Difficulty of Paying Living Expenses: Not hard at all   Food Insecurity: No Food Insecurity (1/2/2025)    Hunger Vital Sign     Worried About Running Out of Food in the Last Year: Never true     Ran Out of Food in the Last Year: Never true   Transportation Needs: No Transportation Needs (1/13/2025)    PRAPARE - Transportation     Lack of Transportation (Medical): No     Lack of Transportation (Non-Medical): No   Physical Activity: Insufficiently Active (1/2/2025)    Exercise Vital Sign     Days of Exercise per Week: 2 days     Minutes of Exercise per Session: 20 min   Stress: No Stress Concern Present (1/25/2024)    [employer] - Occupational Stress Questionnaire     Feeling of Stress : Only a little   Social Connections: Feeling Socially Integrated (6/26/2024)    OASIS : Social Isolation     Frequency of experiencing loneliness or isolation: Never   Intimate Partner Violence: Not At Risk (1/2/2025)    Humiliation, Afraid, Rape, and Kick questionnaire     Fear of Current or Ex-Partner: No     Emotionally Abused: No     Physically Abused: No     Sexually Abused: No   Housing Stability: Low Risk  (1/13/2025)    Housing Stability Vital Sign     Unable to Pay for Housing in the Last Year: No     Number of Times Moved in the Last Year: 1     Homeless in the Last Year: No        No Known Allergies      Review of Systems  Gen: No  fatigue, anorexia, insomnia, fever.   Eyes: No vision loss, double vision, drainage, eye pain.   ENT: No pharyngitis, dry mouth, no hearing changes or ear discharge  Cardiac: No chest pain, palpitations, syncope, near syncope.   Pulmonary: No shortness of breath, cough, hemoptysis.   Heme/lymph: No swollen glands, fever, bleeding.   GI: No abdominal pain, change in bowel habits, melena, hematemesis, hematochezia, nausea, vomiting, diarrhea.   : No discharge, dysuria, frequency, urgency, hematuria.  Endo: No polyuria or weight loss.   Musculoskeletal: Negative for any pain or loss of ROM/weakness  Skin: No rashes or lesions  Neuro: Normal speech, no numbness or weakness. No gait difficulties  Review of systems is otherwise negative unless stated above or in history of present illness.    Physical Exam:  Constitutional:  no distress, alert and cooperative  Eyes: clear sclera  Head/Neck: No apparent injury, trachea midline  Respiratory/Thorax: Patent airways, thorax symmetric, breathing comfortably  Cardiovascular: no pitting edema  Gastrointestinal: Nondistended  Musculoskeletal: ROM intact  Extremities: no clubbing  Neurological: alert, terry x4  Psychological: Appropriate affect    Results for orders placed or performed during the hospital encounter of 03/10/25 (from the past 24 hours)   POCT GLUCOSE   Result Value Ref Range    POCT Glucose 167 (H) 74 - 99 mg/dL     *Note: Due to a large number of results and/or encounters for the requested time period, some results have not been displayed. A complete set of results can be found in Results Review.        Nuris Squires is a 59 y.o. year old female patient who presents for repair nonunion of left femr with Dr. Rodriguez on 3/10. Acute Pain consulted for block for postoperative pain control.     Plan:    - Left fascia illiaca nerve blocks performed postoperatively on 3/10  - Pain medications per primary team  - Will see on POD1 if inpatient    Acute Pain Team  pg [phone]  ph [phone].

## 2025-03-10 NOTE — SIGNIFICANT EVENT
Ortho Post Op Plan    59F with prior 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 Rodriguez on 3/10    Plan:  - Weight bearing: WBAT LLE  - DVT ppx: SCDs, ASA 81mg BID  - Diet: Clear liquid diet. advanced as tolerated  - Pain: Tylenol, oxycodone 5/10, dilaudid PRN for pain management  - Antibiotics: perioperative abx x 3  - FEN: Continue NS at 100cc/hr; HLIV with good PO intake  - Bowel Regimen: Colace, senna, dulcolax   - PT/OT consult  - Pulm: Encourage IS  - Continue home medications  - chronic indwelling catheter in place    Dispo: PT/OT, pain control    Tylor Charles MD  Orthopedic Surgery, PGY5  Available on Owned it     While admitted, this patient will be followed by the Ortho Trauma Team, available via Epic Chat weekdays 6a-6p. Please page [phone] on nights and weekends.    Ortho Trauma  First call: Donny Adams PGY-1  Second call: Jessica Nation PGY-2   Third call: Pito Plata PGY-3    Please call the ortho pager ([phone]) on weekends and between 6p-7a on weekdays.

## 2025-03-11 LAB
ANION GAP SERPL CALC-SCNC: 13 MMOL/L (ref 10–20)
BUN SERPL-MCNC: 38 MG/DL (ref 6–23)
CALCIUM SERPL-MCNC: 8.2 MG/DL (ref 8.6–10.6)
CHLORIDE SERPL-SCNC: 113 MMOL/L (ref 98–107)
CO2 SERPL-SCNC: 20 MMOL/L (ref 21–32)
CREAT SERPL-MCNC: 2.89 MG/DL (ref 0.5–1.05)
EGFRCR SERPLBLD CKD-EPI 2021: 18 ML/MIN/1.73M*2
ERYTHROCYTE [DISTWIDTH] IN BLOOD BY AUTOMATED COUNT: 15 % (ref 11.5–14.5)
GLUCOSE BLD MANUAL STRIP-MCNC: 110 MG/DL (ref 74–99)
GLUCOSE BLD MANUAL STRIP-MCNC: 191 MG/DL (ref 74–99)
GLUCOSE BLD MANUAL STRIP-MCNC: 221 MG/DL (ref 74–99)
GLUCOSE BLD MANUAL STRIP-MCNC: 241 MG/DL (ref 74–99)
GLUCOSE SERPL-MCNC: 125 MG/DL (ref 74–99)
HCT VFR BLD AUTO: 24.9 % (ref 36–46)
HGB BLD-MCNC: 7.8 G/DL (ref 12–16)
MCH RBC QN AUTO: 30.8 PG (ref 26–34)
MCHC RBC AUTO-ENTMCNC: 31.3 G/DL (ref 32–36)
MCV RBC AUTO: 98 FL (ref 80–100)
NRBC BLD-RTO: 0 /100 WBCS (ref 0–0)
PLATELET # BLD AUTO: 314 X10*3/UL (ref 150–450)
POTASSIUM SERPL-SCNC: 4.3 MMOL/L (ref 3.5–5.3)
RBC # BLD AUTO: 2.53 X10*6/UL (ref 4–5.2)
SODIUM SERPL-SCNC: 142 MMOL/L (ref 136–145)
WBC # BLD AUTO: 6.9 X10*3/UL (ref 4.4–11.3)

## 2025-03-11 PROCEDURE — 97165 OT EVAL LOW COMPLEX 30 MIN: CPT | Mod: GO

## 2025-03-11 PROCEDURE — 2500000002 HC RX 250 W HCPCS SELF ADMINISTERED DRUGS (ALT 637 FOR MEDICARE OP, ALT 636 FOR OP/ED)

## 2025-03-11 PROCEDURE — 2500000004 HC RX 250 GENERAL PHARMACY W/ HCPCS (ALT 636 FOR OP/ED)

## 2025-03-11 PROCEDURE — 82947 ASSAY GLUCOSE BLOOD QUANT: CPT

## 2025-03-11 PROCEDURE — 97161 PT EVAL LOW COMPLEX 20 MIN: CPT | Mod: GP

## 2025-03-11 PROCEDURE — 2500000001 HC RX 250 WO HCPCS SELF ADMINISTERED DRUGS (ALT 637 FOR MEDICARE OP)

## 2025-03-11 PROCEDURE — 99231 SBSQ HOSP IP/OBS SF/LOW 25: CPT

## 2025-03-11 PROCEDURE — 80048 BASIC METABOLIC PNL TOTAL CA: CPT

## 2025-03-11 PROCEDURE — 36415 COLL VENOUS BLD VENIPUNCTURE: CPT

## 2025-03-11 PROCEDURE — 1100000001 HC PRIVATE ROOM DAILY

## 2025-03-11 PROCEDURE — 85027 COMPLETE CBC AUTOMATED: CPT

## 2025-03-11 RX ADMIN — ASPIRIN 81 MG: 81 TABLET, COATED ORAL at 09:10

## 2025-03-11 RX ADMIN — GABAPENTIN 300 MG: 300 CAPSULE ORAL at 20:50

## 2025-03-11 RX ADMIN — OXYCODONE 10 MG: 5 TABLET ORAL at 20:50

## 2025-03-11 RX ADMIN — ERGOCALCIFEROL 1250 MCG: 1.25 CAPSULE ORAL at 09:15

## 2025-03-11 RX ADMIN — CEFAZOLIN SODIUM 2 G: 2 INJECTION, SOLUTION INTRAVENOUS at 00:50

## 2025-03-11 RX ADMIN — Medication 1 TABLET: at 09:10

## 2025-03-11 RX ADMIN — ATORVASTATIN CALCIUM 40 MG: 40 TABLET, FILM COATED ORAL at 09:11

## 2025-03-11 RX ADMIN — ISOSORBIDE MONONITRATE 30 MG: 30 TABLET, EXTENDED RELEASE ORAL at 09:11

## 2025-03-11 RX ADMIN — Medication 1 TABLET: at 20:50

## 2025-03-11 RX ADMIN — ASPIRIN 81 MG: 81 TABLET, COATED ORAL at 20:50

## 2025-03-11 RX ADMIN — INSULIN LISPRO 2 UNITS: 100 INJECTION, SOLUTION INTRAVENOUS; SUBCUTANEOUS at 17:51

## 2025-03-11 RX ADMIN — GABAPENTIN 300 MG: 300 CAPSULE ORAL at 09:10

## 2025-03-11 RX ADMIN — CARVEDILOL 12.5 MG: 12.5 TABLET, FILM COATED ORAL at 20:51

## 2025-03-11 RX ADMIN — CARVEDILOL 12.5 MG: 12.5 TABLET, FILM COATED ORAL at 09:10

## 2025-03-11 ASSESSMENT — COGNITIVE AND FUNCTIONAL STATUS - GENERAL
MOVING TO AND FROM BED TO CHAIR: A LITTLE
DAILY ACTIVITIY SCORE: 16
PERSONAL GROOMING: A LITTLE
DRESSING REGULAR LOWER BODY CLOTHING: A LOT
MOVING TO AND FROM BED TO CHAIR: A LOT
DRESSING REGULAR UPPER BODY CLOTHING: A LITTLE
CLIMB 3 TO 5 STEPS WITH RAILING: A LOT
MOBILITY SCORE: 17
TOILETING: A LOT
CLIMB 3 TO 5 STEPS WITH RAILING: TOTAL
TURNING FROM BACK TO SIDE WHILE IN FLAT BAD: A LITTLE
STANDING UP FROM CHAIR USING ARMS: A LITTLE
WALKING IN HOSPITAL ROOM: A LOT
DRESSING REGULAR LOWER BODY CLOTHING: A LITTLE
TURNING FROM BACK TO SIDE WHILE IN FLAT BAD: A LOT
MOBILITY SCORE: 11
MOVING FROM LYING ON BACK TO SITTING ON SIDE OF FLAT BED WITH BEDRAILS: A LOT
HELP NEEDED FOR BATHING: A LOT
DAILY ACTIVITIY SCORE: 22
STANDING UP FROM CHAIR USING ARMS: A LOT
WALKING IN HOSPITAL ROOM: A LITTLE
DRESSING REGULAR UPPER BODY CLOTHING: A LITTLE
MOVING FROM LYING ON BACK TO SITTING ON SIDE OF FLAT BED WITH BEDRAILS: A LITTLE

## 2025-03-11 ASSESSMENT — ACTIVITIES OF DAILY LIVING (ADL)
BATHING_ASSISTANCE: MODERATE
LACK_OF_TRANSPORTATION: NO
ADL_ASSISTANCE: INDEPENDENT
ADL_ASSISTANCE: INDEPENDENT

## 2025-03-11 ASSESSMENT — PAIN - FUNCTIONAL ASSESSMENT
PAIN_FUNCTIONAL_ASSESSMENT: 0-10

## 2025-03-11 ASSESSMENT — PAIN SCALES - WONG BAKER: WONGBAKER_NUMERICALRESPONSE: HURTS LITTLE BIT

## 2025-03-11 ASSESSMENT — PAIN SCALES - GENERAL
PAINLEVEL_OUTOF10: 5 - MODERATE PAIN
PAINLEVEL_OUTOF10: 9
PAINLEVEL_OUTOF10: 5 - MODERATE PAIN
PAINLEVEL_OUTOF10: 0 - NO PAIN
PAINLEVEL_OUTOF10: 5 - MODERATE PAIN

## 2025-03-11 ASSESSMENT — PAIN DESCRIPTION - LOCATION: LOCATION: KNEE

## 2025-03-11 ASSESSMENT — PAIN DESCRIPTION - ORIENTATION: ORIENTATION: LEFT

## 2025-03-11 NOTE — PROGRESS NOTES
03/11/25 1400   Discharge Planning   Living Arrangements Spouse/significant other   Support Systems Spouse/significant other   Assistance Needed Yes   Type of Residence Private residence   Number of Stairs to Enter Residence 6   Number of Stairs Within Residence 0   Do you have animals or pets at home? No   Who is requesting discharge planning? Provider   Home or Post Acute Services In home services   Type of Home Care Services Home PT;Home OT   Expected Discharge Disposition Home H   Does the patient need discharge transport arranged? No   Financial Resource Strain   How hard is it for you to pay for the very basics like food, housing, medical care, and heating? Pt Declined   Housing Stability   In the last 12 months, was there a time when you were not able to pay the mortgage or rent on time? N   At any time in the past 12 months, were you homeless or living in a shelter (including now)? N   Transportation Needs   In the past 12 months, has lack of transportation kept you from medical appointments or from getting medications? no   In the past 12 months, has lack of transportation kept you from meetings, work, or from getting things needed for daily living? No   Stroke Family Assessment   Stroke Family Assessment Needed No   Intensity of Service   Intensity of Service 0-30 min     I spoke with Nuris regarding discharge planning and home needs. Patient states that she lives home with her significant other [phone] where she is usually independent with ADL's she does have a walker, rollator and crutches in the home. I will continue to follow with a safe discharge plan.

## 2025-03-11 NOTE — PROGRESS NOTES
Physical Therapy    Physical Therapy Evaluation    Patient Name: Nuris Squires  MRN: [id number]  Today's Date: 3/11/2025   Time Calculation  Start Time: 0833  Stop Time: 0849  Time Calculation (min): 16 min    Assessment/Plan   PT Assessment  PT Assessment Results: Decreased strength, Impaired balance, Decreased mobility, Pain  Rehab Prognosis: Poor  Barriers to Discharge Home: Caregiver assistance, Physical needs  Caregiver Assistance: Caregiver assistance needed per identified barriers - however, level of patient's required assistance exceeds assistance available at home  Physical Needs: Stair navigation into home limited by function/safety, Ambulating household distances limited by function/safety, High falls risk due to function or environment  End of Session Communication: Bedside nurse  Assessment Comment: Pt presents with decreased strength, balance, and mobility. Pt would benefit from PT to address the above deficits.  End of Session Patient Position: Bed, 3 rail up, Alarm off, not on at start of session  IP OR SWING BED PT PLAN  Inpatient or Swing Bed: Inpatient  PT Plan  Treatment/Interventions: Transfer training, Bed mobility, Gait training, Stair training, Balance training, Strengthening  PT Plan: Ongoing PT  PT Frequency: 5 times per week  PT Discharge Recommendations: Moderate intensity level of continued care  PT Recommended Transfer Status: Assist x1, Assistive device  PT - OK to Discharge: Yes      Subjective   General Visit Information:  Reason for Referral: s/p rIMN L distal femur fx with subsequent non-union now s/p exchange nail L femur  Past Medical History Relevant to Rehab: HTN, DM2, peripheral arterial disease, peripheral vascular disease  Co-Treatment: OT  Co-Treatment Reason: to maximize mobility and safety  Prior to Session Communication: Bedside nurse  Patient Position Received: Bed, 3 rail up, Alarm off, not on at start of session  Family/Caregiver Present: Yes  Caregiver Feedback:  boyfriend present   Home Living:  Home Living  Type of Home: House  Lives With:  (boyfriend)  Home Adaptive Equipment: Walker rolling or standard, Cane (rollator, shower chair)  Home Layout: One level  Home Access: Stairs to enter with rails  Entrance Stairs-Rails: Both  Entrance Stairs-Number of Steps: 6  Prior Level of Function:  Prior Function Per Pt/Caregiver Report  ADL Assistance: Independent  Homemaking Assistance: Independent  Ambulatory Assistance: Independent (with walker)  Vocational: Unemployed  Prior Function Comments: +drives  Precautions:  Precautions  LE Weight Bearing Status:  (WBAT LLE)  Medical Precautions: Fall precautions       Objective     Pain:  Pain Assessment  Pain Assessment: 0-10  0-10 (Numeric) Pain Score: 5 - Moderate pain  Pain Location: Leg  Pain Orientation: Left  Cognition:  Cognition  Overall Cognitive Status: Within Functional Limits      Extremity/Trunk Assessments:  Strength:           RLE   RLE : Within Functional Limits  LLE   LLE : Exceptions to WFL  Strength LLE  L Hip Flexion: 2+/5  L Knee Extension: 2+/5 (with c/o pain)  L Ankle Dorsiflexion:  (at least 3/5)  L Ankle Plantar Flexion:  (at least 3/5)    General Assessments:            Sensation  Light Touch: No apparent deficits     Static Sitting Balance  Static Sitting-Balance Support: Feet supported  Static Sitting-Level of Assistance: Close supervision  Dynamic Sitting Balance  Dynamic Sitting-Balance Support: Feet supported  Dynamic Sitting-Level of Assistance: Contact guard  Static Standing Balance  Static Standing-Balance Support: Bilateral upper extremity supported  Static Standing-Level of Assistance: Minimum assistance  Dynamic Standing Balance  Dynamic Standing-Balance Support: Bilateral upper extremity supported  Dynamic Standing-Level of Assistance: Minimum assistance    Functional Assessments:  Bed Mobility  Bed Mobility: Yes  Bed Mobility 1  Bed Mobility 1: Supine to sitting, Sitting to supine  Level of  Assistance 1: Moderate assistance  Bed Mobility Comments 1: HOB elevated, verbal cues for sequencing, use of bed rail, increased time to perform  Transfers  Transfer: Yes  Transfer 1  Technique 1: Sit to stand, Stand to sit  Transfer Device 1: Walker  Transfer Level of Assistance 1: Moderate assistance, +2  Trials/Comments 1: verbal cues for hand placement  Ambulation/Gait Training  Ambulation/Gait Training Performed: Yes  Ambulation/Gait Training 1  Device 1: Rolling walker  Assistance 1: Minimum assistance  Quality of Gait 1: Decreased step length, Antalgic (decreased bilateral foot clearance; verbal cues for sequencing; min assist for walker management and safety, increased time to perform)  Comments/Distance (ft) 1: performed sidesteps 2-3 feet to the left toward HOB          Outcome Measures:  [facility] Basic Mobility  Turning from your back to your side while in a flat bed without using bedrails: A lot  Moving from lying on your back to sitting on the side of a flat bed without using bedrails: A lot  Moving to and from bed to chair (including a wheelchair): A lot  Standing up from a chair using your arms (e.g. wheelchair or bedside chair): A lot  To walk in hospital room: A lot  Climbing 3-5 steps with railing: Total  Basic Mobility - Total Score: 11                               Encounter Problems       Encounter Problems (Active)       Balance       No LOB with transfers/ambulation        Start:  03/11/25    Expected End:  04/01/25               Mobility       STG - Patient will ambulate 75 feet with walker modified independent        Start:  03/11/25    Expected End:  04/01/25            STG - Patient will ascend and descend six stairs with LRD modified independent        Start:  03/11/25    Expected End:  04/01/25               PT Transfers       STG - Patient will perform bed mobility independent        Start:  03/11/25    Expected End:  04/01/25            STG - Patient will transfer sit to and from stand  with walker modified independent        Start:  03/11/25    Expected End:  04/01/25               Pain - Adult              Education Documentation  Precautions, taught by Marilia Pratt PT at 3/11/2025 10:56 AM.  Learner: Patient  Readiness: Acceptance  Method: Explanation  Response: Verbalizes Understanding  Comment: safe mobility, WB status, role of PT    Mobility Training, taught by Marilia Pratt PT at 3/11/2025 10:56 AM.  Learner: Patient  Readiness: Acceptance  Method: Explanation  Response: Verbalizes Understanding  Comment: safe mobility, WB status, role of PT    Education Comments  No comments found.            03/11/25 at 10:56 AM   Marilia Pratt PT

## 2025-03-11 NOTE — CARE PLAN
The patient's goals for the shift include pain control    The clinical goals for the shift include pt will remain free of falls this shift      Problem: Pain - Adult  Goal: Verbalizes/displays adequate comfort level or baseline comfort level  Outcome: Progressing     Problem: Safety - Adult  Goal: Free from fall injury  Outcome: Met

## 2025-03-11 NOTE — PROGRESS NOTES
Occupational Therapy      Evaluation    Patient Name: Nuris Squires  MRN: [id number]  Today's Date: 3/11/2025  Room: [address] [place] [address]A  Time Calculation  Start Time: 0832  Stop Time: 0849  Time Calculation (min): 17 min    Assessment  IP OT Assessment  Prognosis: Good  Barriers to Discharge Home: Caregiver assistance, Physical needs  Caregiver Assistance: Caregiver assistance needed per identified barriers - however, level of patient's required assistance exceeds assistance available at home  Physical Needs: Intermittent mobility assistance needed, Intermittent ADL assistance needed, High falls risk due to function or environment  Evaluation/Treatment Tolerance: Patient tolerated treatment well  End of Session Communication: Bedside nurse  End of Session Patient Position: Bed, 3 rail up, Alarm on  Plan:  Inpatient Plan  Treatment Interventions: ADL retraining, Compensatory technique education, Functional transfer training  OT Frequency: 3 times per week  OT Discharge Recommendations: Moderate intensity level of continued care  OT Recommended Transfer Status: Assist of 1  OT - OK to Discharge: Yes  OT Assessment  OT Assessment Results: Decreased ADL status, Decreased functional mobility, Decreased IADLs  Prognosis: Good  Evaluation/Treatment Tolerance: Patient tolerated treatment well    Subjective   Current Problem:  1. Closed bicondylar fracture of distal femur, left, with nonunion, subsequent encounter  aspirin 81 mg EC tablet    docusate sodium (Colace) 100 mg capsule    acetaminophen (Tylenol) 325 mg tablet    oxyCODONE (Roxicodone) 5 mg immediate release tablet    ergocalciferol (Vitamin D-2) 1250 mcg (50,000 units) capsule        General:  Reason for Referral: s/p rIMN L distal femur fx with subsequent non-union now s/p exchange nail L femur  Past Medical History Relevant to Rehab: HTN, DM2, peripheral arterial disease, peripheral vascular disease  Co-Treatment: PT  Co-Treatment Reason: to maximize pts safety  Prior  to Session Communication: Bedside nurse  Patient Position Received: Bed, 3 rail up  Family/Caregiver Present: Yes  Caregiver Feedback: boyfriend present  General Comment: willing to participate in therapy   Precautions:  LE Weight Bearing Status: Weight Bearing as Tolerated  Medical Precautions: Fall precautions  Vital Signs:    Pain:  Pain Assessment  Pain Assessment: 0-10  0-10 (Numeric) Pain Score: 5 - Moderate pain  Pain Location: Leg  Pain Orientation: Left  Lines/Tubes/Drains:  Urethral Catheter 14 Fr. (Active)   Number of days: 47         Objective   Cognition:  Overall Cognitive Status: Within Functional Limits  Orientation Level: Oriented X4           Home Living:  Type of Home: House  Lives With: Significant other  Home Adaptive Equipment: Walker rolling or standard  Home Layout: One level  Home Access: Stairs to enter with rails  Entrance Stairs-Rails: Both  Entrance Stairs-Number of Steps: 6   Prior Function:  ADL Assistance: Independent  Homemaking Assistance: Independent  Ambulatory Assistance: Independent (using WW)  Prior Function Comments: + driving and cooking, has help with cleaning  IADL History:     ADL:  Eating Assistance: Independent  Eating Deficit: Setup  Grooming Assistance: Stand by  Grooming Deficit: Setup  Bathing Assistance: Moderate  UE Dressing Assistance: Minimal  LE Dressing Assistance: Maximal  LE Dressing Deficit: Don/doff R sock, Don/doff L sock, Thread RLE into pants, Thread LLE into pants  Toileting Assistance with Device: Maximal  Toileting Deficit: Urinary Catheter  Activity Tolerance:  Endurance: Decreased tolerance for upright activites  Balance:  Dynamic Standing Balance  Dynamic Standing-Comments: Min A using WW to take few side steps, required verbal cues on safety and sequencing steps  Bed Mobility/Transfers: Bed Mobility  Bed Mobility: Yes  Bed Mobility 1  Bed Mobility 1: Supine to sitting, Sitting to supine  Level of Assistance 1: Moderate assistance  Bed Mobility  Comments 1: HOB elevated   and Transfers  Transfer: Yes  Transfer 1  Technique 1: Sit to stand, Stand to sit  Transfer Device 1: Walker  Transfer Level of Assistance 1: Moderate assistance, +2  Trials/Comments 1: verbal cues for safe hand placement       Vision: Vision - Basic Assessment  Current Vision: No visual deficits   and    Sensation:  Light Touch: No apparent deficits  Strength:  Strength Comments: JOURDAN MELENDEZ WFL        Hand Function:  Hand Function  Gross Grasp: Functional  Coordination: Functional  Extremities: RUE   RUE : Within Functional Limits, LUE   LUE: Within Functional Limits,  , and      Outcome Measures: [facility] Daily Activity  Putting on and taking off regular lower body clothing: A lot  Bathing (including washing, rinsing, drying): A lot  Putting on and taking off regular upper body clothing: A little  Toileting, which includes using toilet, bedpan or urinal: A lot  Taking care of personal grooming such as brushing teeth: A little  Eating Meals: None  Daily Activity - Total Score: 16         ,     OT Adult Other Outcome Measures  4AT: negative    Education Documentation  Body Mechanics, taught by Lacy Jennings OT at 3/11/2025 12:04 PM.  Learner: Patient  Readiness: Acceptance  Method: Explanation  Response: Verbalizes Understanding    Precautions, taught by Lacy Jennings OT at 3/11/2025 12:04 PM.  Learner: Patient  Readiness: Acceptance  Method: Explanation  Response: Verbalizes Understanding    ADL Training, taught by Lacy Jennings OT at 3/11/2025 12:04 PM.  Learner: Patient  Readiness: Acceptance  Method: Explanation  Response: Verbalizes Understanding    Education Comments  No comments found.        Goals:     Encounter Problems       Encounter Problems (Active)       ADLs       Patient will perform UB and LB bathing  with modified independent level of assistance and shower chair. (Progressing)       Start:  03/11/25    Expected End:  03/25/25            Patient with complete lower body  dressing with modified independent level of assistance donning and doffing all LE clothes  with DME while edge of bed  (Progressing)       Start:  03/11/25    Expected End:  03/25/25            Patient will complete toileting including hygiene clothing management/hygiene with modified independent level of assistance and grab bars. (Progressing)       Start:  03/11/25    Expected End:  03/25/25               MOBILITY       Patient will perform Functional mobility min Household distances with modified independent level of assistance and least restrictive device in order to improve safety and functional mobility. (Progressing)       Start:  03/11/25    Expected End:  03/25/25               TRANSFERS       Patient will perform bed mobility modified independent level of assistance and bed rails in order to improve safety and independence with mobility (Progressing)       Start:  03/11/25    Expected End:  03/25/25            Patient will complete sit to stand transfer with modified independent level of assistance and least restrictive device in order to improve safety and prepare for out of bed mobility. (Progressing)       Start:  03/11/25    Expected End:  03/25/25 03/11/25 at 12:05 PM   Lacy Jennings OT   Rehab Office: [phone]

## 2025-03-11 NOTE — PROGRESS NOTES
"Orthopaedic Surgery Progress Note    S: No acute events overnight. Pain well controlled.     O:  /79   Pulse 77   Temp 36.5 °C (97.7 °F) (Temporal)   Resp 18   Ht 1.575 m (5' 2.01\")   Wt 103 kg (227 lb 1.2 oz)   SpO2 97%   BMI 41.52 kg/m²     Exam:  Gen: arousable, NAD, appropriately conversational  Cardiac: RRR to peripheral palpation  Resp: nonlabored on RA  GI: soft, nondistended    MSK:  LLE  - Surgical dressing c/d/i  - Fires DF/PF/EHL/FHL  - SILT in saph/sural/SPN/DPN distributions  - Foot warm, well perfused  - DP/PT pulse, brisk cap refill  - Compartments soft and compressible      Ortho Post Op Plan     59F with prior 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 Rodriguez on 3/10     Plan:  - Weight bearing: WBAT LLE  - DVT ppx: SCDs, ASA 81mg BID  - Diet: Clear liquid diet. advanced as tolerated  - Pain: Tylenol, oxycodone 5/10, dilaudid PRN for pain management  - Antibiotics: perioperative abx x 3  - FEN: Continue NS at 100cc/hr; HLIV with good PO intake  - Bowel Regimen: Colace, senna, dulcolax   - PT/OT consult  - Pulm: Encourage IS  - Continue home medications  - chronic indwelling catheter in place     Dispo: PT/OT, pain control       Jessica Nation, PGY-2  Orthopedic Surgery Resident  Available via Deemelo    This patient will be followed by Ortho Trauma team (All chat preferred):    1st call: Kobe Adams, PGY-1  2nd call: Jessica Nation, PGY-2  3rd call: Pito Plata, PGY-3    On weekends and after 6PM:  At [facility]: Please reach out to the orthopaedic on-call resident ([phone])  At [facility]: Please reach out to the orthopaedic on-call PHYLLIS or resident (please refer to Qgenda)      "

## 2025-03-11 NOTE — PROGRESS NOTES
Postop Pain HPI -   Palliative: relieved with IV analgesics and regional local anesthetics  Provocative: movement  Quality:  burning and aching  Radiation:  none  Severity:  5/10  Timing: constant    24-HOUR OPIOID CONSUMPTION:  none    Scheduled medications  acetaminophen, 650 mg, oral, q6h NEHEMIAS  aspirin, 81 mg, oral, BID  atorvastatin, 40 mg, oral, Daily  calcium carbonate-vitamin D3, 1 tablet, oral, BID  carvedilol, 12.5 mg, oral, BID  ergocalciferol, 1,250 mcg, oral, Daily  gabapentin, 300 mg, oral, BID  insulin lispro, 0-5 Units, subcutaneous, TID AC  isosorbide mononitrate ER, 30 mg, oral, Daily  polyethylene glycol, 17 g, oral, Daily  sennosides-docusate sodium, 2 tablet, oral, BID      Continuous medications  lactated Ringer's, 100 mL/hr, Last Rate: 100 mL/hr (03/10/25 2050)  oxygen, 2 L/min      PRN medications  PRN medications: bisacodyl, cyclobenzaprine, hydrALAZINE, HYDROmorphone, magnesium hydroxide, naloxone, ondansetron **OR** ondansetron, oxyCODONE, oxyCODONE, prochlorperazine **OR** prochlorperazine **OR** prochlorperazine, [Held by provider] torsemide     Physical Exam:  Constitutional:  no distress, alert and cooperative  Eyes: clear sclera  Head/Neck: No apparent injury, trachea midline  Respiratory/Thorax: Patent airways, thorax symmetric, breathing comfortably  Cardiovascular: no pitting edema  Gastrointestinal: Nondistended  Musculoskeletal: ROM intact  Extremities: no clubbing  Neurological: alert, terry x4  Psychological: Appropriate affect    Results for orders placed or performed during the hospital encounter of 03/10/25 (from the past 24 hours)   POCT GLUCOSE   Result Value Ref Range    POCT Glucose 161 (H) 74 - 99 mg/dL   POCT GLUCOSE   Result Value Ref Range    POCT Glucose 171 (H) 74 - 99 mg/dL   POCT GLUCOSE   Result Value Ref Range    POCT Glucose 152 (H) 74 - 99 mg/dL     *Note: Due to a large number of results and/or encounters for the requested time period, some results have not been  displayed. A complete set of results can be found in Results Review.         Nuris Squires is a 59 y.o. year old female patient who presents for repair nonunion of left femr with Dr. Rodriguez on 3/10. Acute Pain consulted for block for postoperative pain control.      Plan:     - Left fascia illiaca nerve blocks performed postoperatively on 3/10  - Pain medications per primary team  - APS will sign off      Acute Pain Team  pg [phone] ph [phone].

## 2025-03-11 NOTE — CARE PLAN
The patient's goals for the shift include pain control    The clinical goals for the shift include pain control      Problem: Pain - Adult  Goal: Verbalizes/displays adequate comfort level or baseline comfort level  3/10/2025 2036 by Dyan Diaz RN  Outcome: Progressing  3/10/2025 1904 by Dyan Diaz RN  Outcome: Progressing     Problem: Safety - Adult  Goal: Free from fall injury  3/10/2025 2036 by Dyan Diaz RN  Outcome: Progressing  3/10/2025 1904 by Dyan Diaz RN  Outcome: Progressing     Problem: Discharge Planning  Goal: Discharge to home or other facility with appropriate resources  3/10/2025 2036 by Dyan Diaz RN  Outcome: Progressing  3/10/2025 1904 by Dyan Diaz RN  Outcome: Progressing     Problem: Chronic Conditions and Co-morbidities  Goal: Patient's chronic conditions and co-morbidity symptoms are monitored and maintained or improved  3/10/2025 2036 by Dyan Diaz RN  Outcome: Progressing  3/10/2025 1904 by Dyan Diaz RN  Outcome: Progressing     Problem: Nutrition  Goal: Nutrient intake appropriate for maintaining nutritional needs  3/10/2025 2036 by Dyan Diaz RN  Outcome: Progressing  3/10/2025 1904 by Dyan Diaz RN  Outcome: Progressing

## 2025-03-12 LAB
GLUCOSE BLD MANUAL STRIP-MCNC: 147 MG/DL (ref 74–99)
GLUCOSE BLD MANUAL STRIP-MCNC: 161 MG/DL (ref 74–99)
GLUCOSE BLD MANUAL STRIP-MCNC: 163 MG/DL (ref 74–99)
GLUCOSE BLD MANUAL STRIP-MCNC: 169 MG/DL (ref 74–99)

## 2025-03-12 PROCEDURE — 2500000002 HC RX 250 W HCPCS SELF ADMINISTERED DRUGS (ALT 637 FOR MEDICARE OP, ALT 636 FOR OP/ED)

## 2025-03-12 PROCEDURE — 1100000001 HC PRIVATE ROOM DAILY

## 2025-03-12 PROCEDURE — 2500000001 HC RX 250 WO HCPCS SELF ADMINISTERED DRUGS (ALT 637 FOR MEDICARE OP)

## 2025-03-12 PROCEDURE — 97110 THERAPEUTIC EXERCISES: CPT | Mod: GP,CQ

## 2025-03-12 PROCEDURE — 82947 ASSAY GLUCOSE BLOOD QUANT: CPT

## 2025-03-12 PROCEDURE — 97116 GAIT TRAINING THERAPY: CPT | Mod: GP,CQ

## 2025-03-12 RX ORDER — OXYCODONE HYDROCHLORIDE 5 MG/1
5 TABLET ORAL EVERY 4 HOURS PRN
Qty: 42 TABLET | Refills: 0 | Status: SHIPPED | OUTPATIENT
Start: 2025-03-12

## 2025-03-12 RX ADMIN — ASPIRIN 81 MG: 81 TABLET, COATED ORAL at 09:19

## 2025-03-12 RX ADMIN — Medication 1 TABLET: at 09:19

## 2025-03-12 RX ADMIN — ACETAMINOPHEN 650 MG: 325 TABLET ORAL at 01:01

## 2025-03-12 RX ADMIN — ATORVASTATIN CALCIUM 40 MG: 40 TABLET, FILM COATED ORAL at 09:19

## 2025-03-12 RX ADMIN — OXYCODONE 10 MG: 5 TABLET ORAL at 14:36

## 2025-03-12 RX ADMIN — GABAPENTIN 300 MG: 300 CAPSULE ORAL at 09:19

## 2025-03-12 RX ADMIN — CARVEDILOL 12.5 MG: 12.5 TABLET, FILM COATED ORAL at 20:34

## 2025-03-12 RX ADMIN — OXYCODONE 10 MG: 5 TABLET ORAL at 20:35

## 2025-03-12 RX ADMIN — ASPIRIN 81 MG: 81 TABLET, COATED ORAL at 20:34

## 2025-03-12 RX ADMIN — Medication 1 TABLET: at 20:34

## 2025-03-12 RX ADMIN — INSULIN LISPRO 1 UNITS: 100 INJECTION, SOLUTION INTRAVENOUS; SUBCUTANEOUS at 18:05

## 2025-03-12 RX ADMIN — GABAPENTIN 300 MG: 300 CAPSULE ORAL at 20:34

## 2025-03-12 RX ADMIN — ISOSORBIDE MONONITRATE 30 MG: 30 TABLET, EXTENDED RELEASE ORAL at 09:19

## 2025-03-12 RX ADMIN — INSULIN LISPRO 1 UNITS: 100 INJECTION, SOLUTION INTRAVENOUS; SUBCUTANEOUS at 09:43

## 2025-03-12 RX ADMIN — CARVEDILOL 12.5 MG: 12.5 TABLET, FILM COATED ORAL at 09:19

## 2025-03-12 RX ADMIN — INSULIN LISPRO 1 UNITS: 100 INJECTION, SOLUTION INTRAVENOUS; SUBCUTANEOUS at 13:04

## 2025-03-12 ASSESSMENT — COGNITIVE AND FUNCTIONAL STATUS - GENERAL
DRESSING REGULAR UPPER BODY CLOTHING: A LITTLE
MOBILITY SCORE: 11
MOBILITY SCORE: 17
MOVING TO AND FROM BED TO CHAIR: A LOT
DRESSING REGULAR LOWER BODY CLOTHING: A LITTLE
MOVING TO AND FROM BED TO CHAIR: A LITTLE
STANDING UP FROM CHAIR USING ARMS: A LITTLE
DAILY ACTIVITIY SCORE: 22
MOVING FROM LYING ON BACK TO SITTING ON SIDE OF FLAT BED WITH BEDRAILS: A LOT
STANDING UP FROM CHAIR USING ARMS: A LOT
CLIMB 3 TO 5 STEPS WITH RAILING: A LOT
WALKING IN HOSPITAL ROOM: A LITTLE
WALKING IN HOSPITAL ROOM: A LOT
TURNING FROM BACK TO SIDE WHILE IN FLAT BAD: A LITTLE
MOVING FROM LYING ON BACK TO SITTING ON SIDE OF FLAT BED WITH BEDRAILS: A LITTLE
CLIMB 3 TO 5 STEPS WITH RAILING: TOTAL
TURNING FROM BACK TO SIDE WHILE IN FLAT BAD: A LOT

## 2025-03-12 ASSESSMENT — PAIN - FUNCTIONAL ASSESSMENT: PAIN_FUNCTIONAL_ASSESSMENT: 0-10

## 2025-03-12 ASSESSMENT — PAIN SCALES - GENERAL
PAINLEVEL_OUTOF10: 5 - MODERATE PAIN
PAINLEVEL_OUTOF10: 7
PAINLEVEL_OUTOF10: 7
PAINLEVEL_OUTOF10: 5 - MODERATE PAIN

## 2025-03-12 ASSESSMENT — PAIN DESCRIPTION - LOCATION
LOCATION: LEG
LOCATION: LEG

## 2025-03-12 ASSESSMENT — PAIN DESCRIPTION - ORIENTATION
ORIENTATION: LEFT
ORIENTATION: LEFT

## 2025-03-12 NOTE — CARE PLAN
Problem: Pain - Adult  Goal: Verbalizes/displays adequate comfort level or baseline comfort level  Outcome: Met     Problem: Discharge Planning  Goal: Discharge to home or other facility with appropriate resources  Outcome: Progressing     Problem: Chronic Conditions and Co-morbidities  Goal: Patient's chronic conditions and co-morbidity symptoms are monitored and maintained or improved  Outcome: Progressing     Problem: Nutrition  Goal: Nutrient intake appropriate for maintaining nutritional needs  Outcome: Met   The patient's goals for the shift include pain control    The clinical goals for the shift include Nuris will have her blood sugar well controlled throughout the shift.

## 2025-03-12 NOTE — PROGRESS NOTES
Physical Therapy    Physical Therapy Treatment    Patient Name: Nuris Squires  MRN: [id number]  Department: [place] [id number]  Room: [address] [place] [address]  Today's Date: 3/12/2025  Time Calculation  Start Time: 0938  Stop Time: 1007  Time Calculation (min): 29 min         Assessment/Plan   PT Assessment  Barriers to Discharge Home: Caregiver assistance, Physical needs  Caregiver Assistance: Caregiver assistance needed per identified barriers - however, level of patient's required assistance exceeds assistance available at home  Physical Needs: Stair navigation into home limited by function/safety, Ambulating household distances limited by function/safety, High falls risk due to function or environment  End of Session Communication: Bedside nurse  Assessment Comment: Pt tolerated PT session well, able to advance to chair this date. Pt continues to remain appropriate for Moderate intensity PT upon D/C from hospital.  End of Session Patient Position: Up in chair, Alarm on  PT Plan  Inpatient/Swing Bed or Outpatient: Inpatient  PT Plan  Treatment/Interventions: Transfer training, Bed mobility, Gait training, Stair training, Balance training, Strengthening  PT Plan: Ongoing PT  PT Frequency: 5 times per week  PT Discharge Recommendations: Moderate intensity level of continued care  PT Recommended Transfer Status: Assist x1, Assistive device  PT - OK to Discharge: Yes      General Visit Information:   PT  Visit  PT Received On: 03/12/25  General  Missed Visit Reason:  (n/a)  Family/Caregiver Present: No  Prior to Session Communication: Bedside nurse  Patient Position Received: Bed, 3 rail up, Alarm on  General Comment: Pt supine in bed, agreeable to work with PT.    Subjective   Precautions:  Precautions  LE Weight Bearing Status: Weight Bearing as Tolerated (LLE)  Medical Precautions: Fall precautions    Objective   Pain:  Pain Assessment  Pain Assessment: 0-10  0-10 (Numeric) Pain Score: 5 - Moderate pain  Pain Type: Surgical pain  Pain  Location: Leg  Pain Orientation: Left  Cognition:  Cognition  Overall Cognitive Status: Within Functional Limits  Orientation Level: Oriented X4    Activity Tolerance:  Activity Tolerance  Endurance: Tolerates 10 - 20 min exercise with multiple rests  Treatments:  Therapeutic Exercise  Therapeutic Exercise Performed: Yes  Therapeutic Exercise Activity 1: Supine: AP, QS, HS, Hip ABD x10 BLE (AAROM HS and Hip ABD, LLE)    Bed Mobility  Bed Mobility: Yes  Bed Mobility 1  Bed Mobility 1: Supine to sitting  Level of Assistance 1: Moderate assistance, Minimal verbal cues  Bed Mobility Comments 1: Assist with LLE and trunk    Ambulation/Gait Training  Ambulation/Gait Training Performed: Yes  Ambulation/Gait Training 1  Surface 1: Level tile  Device 1: Rolling walker  Assistance 1: Moderate assistance, Minimum assistance, Minimal verbal cues  Quality of Gait 1: Wide base of support, Diminished heel strike, Decreased step length, Inconsistent stride length, Forward flexed posture, Antalgic (Decreased cortney, decresaed step lenght,)  Comments/Distance (ft) 1: x5ft laterally EOB and to chair, decreased WBing on LLE, cues for sequencing.    Transfers  Transfer: Yes  Transfer 1  Transfer From 1: Bed to  Transfer to 1: Stand  Technique 1: Sit to stand  Transfer Device 1: Walker  Transfer Level of Assistance 1: Moderate assistance, Minimal verbal cues  Trials/Comments 1: x1 trial, cues for safe hand placement.  Transfers 2  Transfer From 2: Stand to  Transfer to 2: Chair with arms  Technique 2: Stand to sit  Transfer Device 2: Walker  Transfer Level of Assistance 2: Minimum assistance, Minimal verbal cues  Trials/Comments 2: x1 trial, cues for use of hands on armrests and kicking out of LLE.    Stairs  Stairs: No    Outcome Measures:  [facility] Basic Mobility  Turning from your back to your side while in a flat bed without using bedrails: A lot  Moving from lying on your back to sitting on the side of a flat bed without using  bedrails: A lot  Moving to and from bed to chair (including a wheelchair): A lot  Standing up from a chair using your arms (e.g. wheelchair or bedside chair): A lot  To walk in hospital room: A lot  Climbing 3-5 steps with railing: Total  Basic Mobility - Total Score: 11    Education Documentation  Precautions, taught by Lynne Khanna PTA at 3/12/2025 11:23 AM.  Learner: Patient  Readiness: Acceptance  Method: Explanation  Response: Verbalizes Understanding  Comment: HEP, Progression of mobility and OOB activity as tolerated    Mobility Training, taught by Lynne Khanna PTA at 3/12/2025 11:23 AM.  Learner: Patient  Readiness: Acceptance  Method: Explanation  Response: Verbalizes Understanding  Comment: HEP, Progression of mobility and OOB activity as tolerated    Education Comments  No comments found.        OP EDUCATION:       Encounter Problems       Encounter Problems (Active)       Balance       No LOB with transfers/ambulation  (Progressing)       Start:  03/11/25    Expected End:  04/01/25               Mobility       STG - Patient will ambulate 75 feet with walker modified independent  (Progressing)       Start:  03/11/25    Expected End:  04/01/25            STG - Patient will ascend and descend six stairs with LRD modified independent  (Progressing)       Start:  03/11/25    Expected End:  04/01/25               PT Transfers       STG - Patient will perform bed mobility independent  (Progressing)       Start:  03/11/25    Expected End:  04/01/25            STG - Patient will transfer sit to and from stand with walker modified independent  (Progressing)       Start:  03/11/25    Expected End:  04/01/25               Pain - Adult            03/12/25 at 11:25 AM   Lynne Khanna PTA   Rehab Office: [phone]

## 2025-03-12 NOTE — PROGRESS NOTES
"Orthopaedic Surgery Progress Note    S: No acute events overnight. Pain well controlled. Rec for SNF. No acute concerns    O:  /69 (BP Location: Right arm, Patient Position: Lying)   Pulse 68   Temp 36.2 °C (97.2 °F) (Temporal)   Resp 18   Ht 1.575 m (5' 2.01\")   Wt 103 kg (227 lb 1.2 oz)   SpO2 96%   BMI 41.52 kg/m²     Exam:  Gen: arousable, NAD, appropriately conversational  Cardiac: RRR to peripheral palpation  Resp: nonlabored on RA  GI: soft, nondistended    MSK:  LLE  - Surgical dressing c/d/i  - Fires DF/PF/EHL/FHL  - SILT in saph/sural/SPN/DPN distributions  - Foot warm, well perfused  - DP/PT pulse, brisk cap refill  - Compartments soft and compressible      Ortho Post Op Plan     59F with prior 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 Rodriguez on 3/10     Plan:  - Weight bearing: WBAT LLE  - DVT ppx: SCDs, ASA 81mg BID  - Diet: Regular  - Pain: Tylenol, oxycodone 5/10, dilaudid PRN for pain management  - Antibiotics: perioperative abx x 3  - Bowel Regimen: Colace, senna, dulcolax   - PT/OT consult  - Pulm: Encourage IS  - Continue home medications  - chronic indwelling catheter in place     Dispo: pending SNF placement       Jessica Nation, PGY-2  Orthopedic Surgery Resident  [facility]    This patient will be followed by Ortho Trauma team (All chat preferred):    1st call: Kobe Adams, PGY-1  2nd call: Jessica Nation PGY-2  3rd call: Pito Plata PGY-3    On weekends and after 6PM:  At [facility]: Please reach out to the orthopaedic on-call resident ([phone])  At [facility]: Please reach out to the orthopaedic on-call PHYLLIS or resident (please refer to Qgenda)      "

## 2025-03-12 NOTE — CARE PLAN
The patient's goals for the shift include remain comfortable this shift    The clinical goals for the shift include pain control

## 2025-03-13 ENCOUNTER — TELEPHONE (OUTPATIENT)
Dept: HOME HEALTH SERVICES | Facility: HOME HEALTH | Age: 60
End: 2025-03-13
Payer: COMMERCIAL

## 2025-03-13 LAB
GLUCOSE BLD MANUAL STRIP-MCNC: 113 MG/DL (ref 74–99)
GLUCOSE BLD MANUAL STRIP-MCNC: 132 MG/DL (ref 74–99)
GLUCOSE BLD MANUAL STRIP-MCNC: 191 MG/DL (ref 74–99)
GLUCOSE BLD MANUAL STRIP-MCNC: 229 MG/DL (ref 74–99)
GLUCOSE BLD MANUAL STRIP-MCNC: 280 MG/DL (ref 74–99)

## 2025-03-13 PROCEDURE — 97116 GAIT TRAINING THERAPY: CPT | Mod: GP,CQ

## 2025-03-13 PROCEDURE — 1100000001 HC PRIVATE ROOM DAILY

## 2025-03-13 PROCEDURE — 97535 SELF CARE MNGMENT TRAINING: CPT | Mod: GO

## 2025-03-13 PROCEDURE — 97110 THERAPEUTIC EXERCISES: CPT | Mod: GP,CQ

## 2025-03-13 PROCEDURE — 82947 ASSAY GLUCOSE BLOOD QUANT: CPT

## 2025-03-13 PROCEDURE — 2500000004 HC RX 250 GENERAL PHARMACY W/ HCPCS (ALT 636 FOR OP/ED)

## 2025-03-13 PROCEDURE — 2500000002 HC RX 250 W HCPCS SELF ADMINISTERED DRUGS (ALT 637 FOR MEDICARE OP, ALT 636 FOR OP/ED)

## 2025-03-13 PROCEDURE — 2500000001 HC RX 250 WO HCPCS SELF ADMINISTERED DRUGS (ALT 637 FOR MEDICARE OP)

## 2025-03-13 RX ORDER — BISACODYL 10 MG/1
10 SUPPOSITORY RECTAL ONCE
Status: DISCONTINUED | OUTPATIENT
Start: 2025-03-13 | End: 2025-03-14

## 2025-03-13 RX ORDER — POLYETHYLENE GLYCOL 3350 17 G/17G
17 POWDER, FOR SOLUTION ORAL 2 TIMES DAILY
Status: DISCONTINUED | OUTPATIENT
Start: 2025-03-13 | End: 2025-03-14 | Stop reason: HOSPADM

## 2025-03-13 RX ADMIN — CARVEDILOL 12.5 MG: 12.5 TABLET, FILM COATED ORAL at 08:25

## 2025-03-13 RX ADMIN — GABAPENTIN 300 MG: 300 CAPSULE ORAL at 20:55

## 2025-03-13 RX ADMIN — POLYETHYLENE GLYCOL 3350 17 G: 17 POWDER, FOR SOLUTION ORAL at 08:25

## 2025-03-13 RX ADMIN — ACETAMINOPHEN 650 MG: 325 TABLET ORAL at 17:36

## 2025-03-13 RX ADMIN — ATORVASTATIN CALCIUM 40 MG: 40 TABLET, FILM COATED ORAL at 08:26

## 2025-03-13 RX ADMIN — ASPIRIN 81 MG: 81 TABLET, COATED ORAL at 08:25

## 2025-03-13 RX ADMIN — OXYCODONE 5 MG: 5 TABLET ORAL at 16:23

## 2025-03-13 RX ADMIN — SENNOSIDES AND DOCUSATE SODIUM 2 TABLET: 8.6; 5 TABLET ORAL at 20:55

## 2025-03-13 RX ADMIN — Medication 1 TABLET: at 08:25

## 2025-03-13 RX ADMIN — POLYETHYLENE GLYCOL 3350 17 G: 17 POWDER, FOR SOLUTION ORAL at 20:55

## 2025-03-13 RX ADMIN — SENNOSIDES AND DOCUSATE SODIUM 2 TABLET: 8.6; 5 TABLET ORAL at 08:25

## 2025-03-13 RX ADMIN — ISOSORBIDE MONONITRATE 30 MG: 30 TABLET, EXTENDED RELEASE ORAL at 08:25

## 2025-03-13 RX ADMIN — MAGNESIUM HYDROXIDE 30 ML: 400 SUSPENSION ORAL at 23:25

## 2025-03-13 RX ADMIN — ASPIRIN 81 MG: 81 TABLET, COATED ORAL at 20:55

## 2025-03-13 RX ADMIN — CARVEDILOL 12.5 MG: 12.5 TABLET, FILM COATED ORAL at 20:55

## 2025-03-13 RX ADMIN — GABAPENTIN 300 MG: 300 CAPSULE ORAL at 08:26

## 2025-03-13 RX ADMIN — INSULIN LISPRO 2 UNITS: 100 INJECTION, SOLUTION INTRAVENOUS; SUBCUTANEOUS at 17:34

## 2025-03-13 RX ADMIN — Medication 1 TABLET: at 20:55

## 2025-03-13 ASSESSMENT — PAIN - FUNCTIONAL ASSESSMENT
PAIN_FUNCTIONAL_ASSESSMENT: 0-10
PAIN_FUNCTIONAL_ASSESSMENT: 0-10

## 2025-03-13 ASSESSMENT — COGNITIVE AND FUNCTIONAL STATUS - GENERAL
WALKING IN HOSPITAL ROOM: A LITTLE
TURNING FROM BACK TO SIDE WHILE IN FLAT BAD: A LOT
DAILY ACTIVITIY SCORE: 16
DRESSING REGULAR LOWER BODY CLOTHING: A LOT
MOBILITY SCORE: 11
PERSONAL GROOMING: A LITTLE
WALKING IN HOSPITAL ROOM: A LOT
TOILETING: A LOT
CLIMB 3 TO 5 STEPS WITH RAILING: TOTAL
TURNING FROM BACK TO SIDE WHILE IN FLAT BAD: A LITTLE
STANDING UP FROM CHAIR USING ARMS: A LOT
MOVING TO AND FROM BED TO CHAIR: A LITTLE
MOBILITY SCORE: 17
DRESSING REGULAR UPPER BODY CLOTHING: A LITTLE
STANDING UP FROM CHAIR USING ARMS: A LITTLE
DRESSING REGULAR UPPER BODY CLOTHING: A LITTLE
HELP NEEDED FOR BATHING: A LOT
CLIMB 3 TO 5 STEPS WITH RAILING: A LOT
MOVING FROM LYING ON BACK TO SITTING ON SIDE OF FLAT BED WITH BEDRAILS: A LITTLE
DAILY ACTIVITIY SCORE: 22
MOVING FROM LYING ON BACK TO SITTING ON SIDE OF FLAT BED WITH BEDRAILS: A LOT
MOVING TO AND FROM BED TO CHAIR: A LOT
DRESSING REGULAR LOWER BODY CLOTHING: A LITTLE

## 2025-03-13 ASSESSMENT — ACTIVITIES OF DAILY LIVING (ADL): HOME_MANAGEMENT_TIME_ENTRY: 12

## 2025-03-13 ASSESSMENT — PAIN SCALES - GENERAL
PAINLEVEL_OUTOF10: 0 - NO PAIN
PAINLEVEL_OUTOF10: 4
PAINLEVEL_OUTOF10: 6
PAINLEVEL_OUTOF10: 5 - MODERATE PAIN
PAINLEVEL_OUTOF10: 6

## 2025-03-13 NOTE — PROGRESS NOTES
"Orthopaedic Surgery Progress Note    S: No acute events overnight. Vitals stable, pain controlled. Remains pending SNF placement. No acute concerns.    O:  /81 (BP Location: Right arm, Patient Position: Lying)   Pulse 71   Temp 36.2 °C (97.2 °F) (Temporal)   Resp 18   Ht 1.575 m (5' 2.01\")   Wt 103 kg (227 lb 1.2 oz)   SpO2 96%   BMI 41.52 kg/m²     Exam:  Gen: arousable, NAD, appropriately conversational, supine in bed  Cardiac: RRR to peripheral palpation  Resp: nonlabored on RA  GI: soft, nondistended    MSK:  LLE  - Surgical dressing c/d/i  - Fires DF/PF/EHL/FHL  - SILT in saph/sural/SPN/DPN distributions  - Foot warm, well perfused  - DP/PT pulse, brisk cap refill  - Compartments soft and compressible      Ortho Post Op Plan     59F with prior 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 Rodriguez on 3/10     Plan:  - Weight bearing: WBAT LLE  - DVT ppx: SCDs, ASA 81mg BID  - Diet: Regular  - Pain: Tylenol, oxycodone 5/10, dilaudid PRN for pain management  - Antibiotics: perioperative abx x 3  - Bowel Regimen: Colace, senna, dulcolax   - PT/OT consult  - Pulm: Encourage IS  - Continue home medications  - chronic indwelling catheter in place     Dispo: pending SNF placement       Jessica Nation, PGY-2  Orthopedic Surgery Resident  Available via [employer]    This patient will be followed by Ortho Trauma team (All chat preferred):    1st call: Kobe Adams, PGY-1  2nd call: Jessica Nation, PGY-2  3rd call: Pito Plata PGY-3    On weekends and after 6PM:  At [facility]: Please reach out to the orthopaedic on-call resident ([phone])  At [facility]: Please reach out to the orthopaedic on-call PHYLLIS or resident (please refer to Qgenda)      "

## 2025-03-13 NOTE — TELEPHONE ENCOUNTER
Dorota Rodriguez,    Home Care received HC Orders for Nuris Squires needing PT and OT services. The HC Team is not able to do a PT start of care within Medicare Guidelines of 48 hrs. Due to the Inability to accommodate the patient's needs in a safe and timely manner [facility] will need to make the referral a Non Admit.   If you have further questions, feel free to reach out to our office at [phone]. Thank you, [facility] Intake.    THANK YOU,  Trinidad Weldon  [facility]

## 2025-03-13 NOTE — PROGRESS NOTES
Physical Therapy    Physical Therapy Treatment    Patient Name: Nuris Squires  MRN: [id number]  Department: [place] [address]  Room: [address] [place] [address]  Today's Date: 3/13/2025  Time Calculation  Start Time: 0907  Stop Time: 0934  Time Calculation (min): 27 min         Assessment/Plan   PT Assessment  Barriers to Discharge Home: Caregiver assistance, Physical needs  Caregiver Assistance: Caregiver assistance needed per identified barriers - however, level of patient's required assistance exceeds assistance available at home  Physical Needs: Stair navigation into home limited by function/safety, Ambulating household distances limited by function/safety, High falls risk due to function or environment  End of Session Communication: Bedside nurse  Assessment Comment: Pt tolerated PT session well, able to complete LE exercises and short bout of ambulation to bedside chair this date. Pt continues to require ModA for bed mobility, transfers and gait. Pt continues to remain appropriate for Moderate intensity PT upon D/C from hospital.  End of Session Patient Position: Up in chair, Alarm on  PT Plan  Inpatient/Swing Bed or Outpatient: Inpatient  PT Plan  Treatment/Interventions: Transfer training, Bed mobility, Gait training, Stair training, Balance training, Strengthening  PT Plan: Ongoing PT  PT Frequency: 5 times per week  PT Discharge Recommendations: Moderate intensity level of continued care  PT Recommended Transfer Status: Assist x1, Assistive device  PT - OK to Discharge: Yes      General Visit Information:   PT  Visit  PT Received On: 03/13/25  General  Missed Visit Reason:  (n/a)  Family/Caregiver Present: No  Prior to Session Communication: Bedside nurse  Patient Position Received: Bed, 3 rail up, Alarm on  General Comment: Pt supine in bed, agreeable to work with PT    Subjective   Precautions:  Precautions  LE Weight Bearing Status: Weight Bearing as Tolerated (LLE)  Medical Precautions: Fall precautions    Objective    Pain:  Pain Assessment  Pain Assessment: 0-10  0-10 (Numeric) Pain Score: 6  Pain Type: Surgical pain  Pain Location: Leg  Pain Orientation: Left  Pain Interventions: Repositioned  Cognition:  Cognition  Overall Cognitive Status: Within Functional Limits  Orientation Level: Oriented X4    Activity Tolerance:  Activity Tolerance  Endurance: Tolerates 10 - 20 min exercise with multiple rests  Treatments:  Therapeutic Exercise  Therapeutic Exercise Performed: Yes  Therapeutic Exercise Activity 1: Supine: AP, QS, HS, SAQ, Hip ABD x10 BLE (AAROM HS, SAQ and Hip ABD, LLE)    Bed Mobility  Bed Mobility: Yes  Bed Mobility 1  Bed Mobility 1: Supine to sitting  Level of Assistance 1: Moderate assistance, Minimal verbal cues  Bed Mobility Comments 1: Assist at trunk and LLE to advance OOB  Bed Mobility 2  Bed Mobility  2: Scooting  Level of Assistance 2: Contact guard, Close supervision, Minimal verbal cues  Bed Mobility Comments 2: Increased time to perform, good use of BUEs and RLE to assist in scooting towards EOB    Ambulation/Gait Training  Ambulation/Gait Training Performed: Yes  Ambulation/Gait Training 1  Surface 1: Level tile  Device 1: Rolling walker  Assistance 1: Moderate assistance, Minimal verbal cues  Quality of Gait 1: Wide base of support, Diminished heel strike, Decreased step length, Inconsistent stride length, Forward flexed posture, Antalgic (Decreased cortney, decreased endurance)  Comments/Distance (ft) 1: x5ft laterally towards bedside chair, cues for sequencing and walker management. Increased time to complete, pt with increased difficulty advancing RLE while WBing on LLE.    Transfers  Transfer: Yes  Transfer 1  Transfer From 1: Bed to  Transfer to 1: Stand  Technique 1: Sit to stand  Transfer Device 1: Walker  Transfer Level of Assistance 1: Moderate assistance, Minimal verbal cues  Trials/Comments 1: Cues for sequencing and hand placement  Transfers 2  Transfer From 2: Stand to  Transfer to 2:  Chair with arms  Technique 2: Stand to sit  Transfer Device 2: Walker  Transfer Level of Assistance 2: Minimum assistance, Minimal verbal cues  Trials/Comments 2: Cues for hand placement and kicking out LLE prior to sitting.    Stairs  Stairs: No    Outcome Measures:  [facility] Basic Mobility  Turning from your back to your side while in a flat bed without using bedrails: A lot  Moving from lying on your back to sitting on the side of a flat bed without using bedrails: A lot  Moving to and from bed to chair (including a wheelchair): A lot  Standing up from a chair using your arms (e.g. wheelchair or bedside chair): A lot  To walk in hospital room: A lot  Climbing 3-5 steps with railing: Total  Basic Mobility - Total Score: 11    Education Documentation  Precautions, taught by Lynne Khanna PTA at 3/13/2025 10:21 AM.  Learner: Patient  Readiness: Acceptance  Method: Explanation  Response: Verbalizes Understanding  Comment: HEP, progression of mobility,    Mobility Training, taught by Lynne Khanna PTA at 3/13/2025 10:21 AM.  Learner: Patient  Readiness: Acceptance  Method: Explanation  Response: Verbalizes Understanding  Comment: HEP, progression of mobility,    Education Comments  No comments found.        OP EDUCATION:       Encounter Problems       Encounter Problems (Active)       Balance       No LOB with transfers/ambulation  (Progressing)       Start:  03/11/25    Expected End:  04/01/25               Mobility       STG - Patient will ambulate 75 feet with walker modified independent  (Progressing)       Start:  03/11/25    Expected End:  04/01/25            STG - Patient will ascend and descend six stairs with LRD modified independent  (Progressing)       Start:  03/11/25    Expected End:  04/01/25               PT Transfers       STG - Patient will perform bed mobility independent  (Progressing)       Start:  03/11/25    Expected End:  04/01/25            STG - Patient will transfer sit to and from stand  with walker modified independent  (Progressing)       Start:  03/11/25    Expected End:  04/01/25               Pain - Adult            03/13/25 at 10:22 AM   Lynne Khanna [facility]   Rehab Office: [phone]

## 2025-03-13 NOTE — PROGRESS NOTES
Occupational Therapy    Occupational Therapy Treatment    Name: Nuris Squires  MRN: [id number]  : 1965  Date: 25  Time Calculation  Start Time: 1057  Stop Time: 1109  Time Calculation (min): 12 min    Assessment:  Prognosis: Good  Barriers to Discharge Home: Caregiver assistance, Physical needs  Caregiver Assistance: Caregiver assistance needed per identified barriers - however, level of patient's required assistance exceeds assistance available at home  Physical Needs: Intermittent mobility assistance needed, Intermittent ADL assistance needed, High falls risk due to function or environment  Evaluation/Treatment Tolerance: Patient tolerated treatment well  Medical Staff Made Aware: Yes  End of Session Communication: Bedside nurse  End of Session Patient Position: Up in chair, Alarm on  Plan:  Treatment Interventions: ADL retraining, Compensatory technique education, Functional transfer training  OT Frequency: 3 times per week  OT Discharge Recommendations: Moderate intensity level of continued care  OT Recommended Transfer Status: Assist of 1  OT - OK to Discharge: Yes    Subjective   General:  OT Last Visit  OT Received On: 25  Reason for Referral: s/p rIMN L distal femur fx with subsequent non-union now s/p exchange nail L femur  Past Medical History Relevant to Rehab: HTN, DM2, peripheral arterial disease, peripheral vascular disease  Prior to Session Communication: Bedside nurse  Patient Position Received: Up in chair, Alarm on  Family/Caregiver Present: No       Cognition:  Overall Cognitive Status: Within Functional Limits  Orientation Level: Oriented X4    Pain Assessment:  Pain Assessment  Pain Assessment: 0-10  0-10 (Numeric) Pain Score: 6  Pain Type: Surgical pain  Pain Location: Leg  Pain Orientation: Left  Pain Interventions: Repositioned     Objective   Activities of Daily Living:        Grooming  Grooming Level of Assistance: Setup  Grooming Where Assessed: Chair    UE Bathing  UE  Bathing Level of Assistance: Minimum assistance, Minimal verbal cues  UE Bathing Where Assessed:  (supported seated position to complete upper body sponge bathing.)    LE Bathing  LE Bathing Level of Assistance:  (anticipate max assist with lower body sponge bathing.)    UE Dressing  UE Dressing Level of Assistance: Setup  UE Dressing Where Assessed: Chair    LE Dressing  LE Dressing: Yes  LE Dressing Adaptive Equipment: Reacher  Pants Level of Assistance: Maximum assistance, Minimal verbal cues  LE Dressing Where Assessed:  (initiated in sitting and completed in standing.)      Bed Mobility/Transfers:   Bed Mobility  Bed Mobility: No    Transfers  Transfer: Yes  Transfer 1  Transfer From 1: Chair with arms to  Transfer to 1: Stand  Technique 1: Sit to stand, Stand to sit  Transfer Device 1: Walker  Transfer Level of Assistance 1: Moderate assistance, Minimal verbal cues      Outcome Measures:  [facility] Daily Activity  Putting on and taking off regular lower body clothing: A lot  Bathing (including washing, rinsing, drying): A lot  Putting on and taking off regular upper body clothing: A little  Toileting, which includes using toilet, bedpan or urinal: A lot  Taking care of personal grooming such as brushing teeth: A little  Eating Meals: None  Daily Activity - Total Score: 16     Education Documentation  Body Mechanics, taught by Pablito Holley OT at 3/13/2025 11:21 AM.  Learner: Patient  Readiness: Acceptance  Method: Explanation  Response: Verbalizes Understanding    Precautions, taught by Pablito Holley OT at 3/13/2025 11:21 AM.  Learner: Patient  Readiness: Acceptance  Method: Explanation  Response: Verbalizes Understanding    ADL Training, taught by Pablito Holley OT at 3/13/2025 11:21 AM.  Learner: Patient  Readiness: Acceptance  Method: Explanation  Response: Verbalizes Understanding    Education Comments  No comments found.        Goals:  Encounter Problems       Encounter Problems (Active)       ADLs        Patient will perform UB and LB bathing  with modified independent level of assistance and shower chair. (Progressing)       Start:  03/11/25    Expected End:  03/25/25            Patient with complete lower body dressing with modified independent level of assistance donning and doffing all LE clothes  with DME while edge of bed  (Progressing)       Start:  03/11/25    Expected End:  03/25/25            Patient will complete toileting including hygiene clothing management/hygiene with modified independent level of assistance and grab bars. (Progressing)       Start:  03/11/25    Expected End:  03/25/25               MOBILITY       Patient will perform Functional mobility min Household distances with modified independent level of assistance and least restrictive device in order to improve safety and functional mobility. (Progressing)       Start:  03/11/25    Expected End:  03/25/25               TRANSFERS       Patient will perform bed mobility modified independent level of assistance and bed rails in order to improve safety and independence with mobility (Progressing)       Start:  03/11/25    Expected End:  03/25/25            Patient will complete sit to stand transfer with modified independent level of assistance and least restrictive device in order to improve safety and prepare for out of bed mobility. (Progressing)       Start:  03/11/25    Expected End:  03/25/25 03/13/25 at 11:22 AM   Pablito Holley OTR/L, OTD  [phone]

## 2025-03-14 ENCOUNTER — DOCUMENTATION (OUTPATIENT)
Dept: HOME HEALTH SERVICES | Facility: HOME HEALTH | Age: 60
End: 2025-03-14
Payer: COMMERCIAL

## 2025-03-14 VITALS
RESPIRATION RATE: 18 BRPM | TEMPERATURE: 97.3 F | OXYGEN SATURATION: 95 % | HEART RATE: 75 BPM | WEIGHT: 227.07 LBS | BODY MASS INDEX: 41.79 KG/M2 | DIASTOLIC BLOOD PRESSURE: 85 MMHG | HEIGHT: 62 IN | SYSTOLIC BLOOD PRESSURE: 175 MMHG

## 2025-03-14 LAB — GLUCOSE BLD MANUAL STRIP-MCNC: 199 MG/DL (ref 74–99)

## 2025-03-14 PROCEDURE — 97116 GAIT TRAINING THERAPY: CPT | Mod: GP,CQ

## 2025-03-14 PROCEDURE — 2500000002 HC RX 250 W HCPCS SELF ADMINISTERED DRUGS (ALT 637 FOR MEDICARE OP, ALT 636 FOR OP/ED)

## 2025-03-14 PROCEDURE — 2500000001 HC RX 250 WO HCPCS SELF ADMINISTERED DRUGS (ALT 637 FOR MEDICARE OP)

## 2025-03-14 PROCEDURE — 2500000004 HC RX 250 GENERAL PHARMACY W/ HCPCS (ALT 636 FOR OP/ED)

## 2025-03-14 PROCEDURE — 97530 THERAPEUTIC ACTIVITIES: CPT | Mod: GP,CQ

## 2025-03-14 PROCEDURE — 82947 ASSAY GLUCOSE BLOOD QUANT: CPT

## 2025-03-14 RX ORDER — INSULIN LISPRO 100 [IU]/ML
0-10 INJECTION, SOLUTION INTRAVENOUS; SUBCUTANEOUS
Status: DISCONTINUED | OUTPATIENT
Start: 2025-03-14 | End: 2025-03-14 | Stop reason: HOSPADM

## 2025-03-14 RX ORDER — BISACODYL 10 MG/1
10 SUPPOSITORY RECTAL ONCE
Status: DISCONTINUED | OUTPATIENT
Start: 2025-03-14 | End: 2025-03-14

## 2025-03-14 RX ORDER — SYRING-NEEDL,DISP,INSUL,0.3 ML 29 G X1/2"
296 SYRINGE, EMPTY DISPOSABLE MISCELLANEOUS ONCE
Status: COMPLETED | OUTPATIENT
Start: 2025-03-14 | End: 2025-03-14

## 2025-03-14 RX ADMIN — OXYCODONE 5 MG: 5 TABLET ORAL at 09:44

## 2025-03-14 RX ADMIN — ACETAMINOPHEN 650 MG: 325 TABLET ORAL at 06:27

## 2025-03-14 RX ADMIN — ASPIRIN 81 MG: 81 TABLET, COATED ORAL at 09:44

## 2025-03-14 RX ADMIN — POLYETHYLENE GLYCOL 3350 17 G: 17 POWDER, FOR SOLUTION ORAL at 09:44

## 2025-03-14 RX ADMIN — SENNOSIDES AND DOCUSATE SODIUM 2 TABLET: 8.6; 5 TABLET ORAL at 09:44

## 2025-03-14 RX ADMIN — ACETAMINOPHEN 650 MG: 325 TABLET ORAL at 12:49

## 2025-03-14 RX ADMIN — ATORVASTATIN CALCIUM 40 MG: 40 TABLET, FILM COATED ORAL at 09:44

## 2025-03-14 RX ADMIN — INSULIN LISPRO 2 UNITS: 100 INJECTION, SOLUTION INTRAVENOUS; SUBCUTANEOUS at 09:41

## 2025-03-14 RX ADMIN — Medication 1 TABLET: at 09:45

## 2025-03-14 RX ADMIN — CARVEDILOL 12.5 MG: 12.5 TABLET, FILM COATED ORAL at 09:44

## 2025-03-14 RX ADMIN — ISOSORBIDE MONONITRATE 30 MG: 30 TABLET, EXTENDED RELEASE ORAL at 09:44

## 2025-03-14 RX ADMIN — GABAPENTIN 300 MG: 300 CAPSULE ORAL at 09:44

## 2025-03-14 RX ADMIN — MAGNESIUM CITRATE 296 ML: 1.75 LIQUID ORAL at 12:49

## 2025-03-14 ASSESSMENT — COGNITIVE AND FUNCTIONAL STATUS - GENERAL
WALKING IN HOSPITAL ROOM: A LOT
MOVING TO AND FROM BED TO CHAIR: A LOT
CLIMB 3 TO 5 STEPS WITH RAILING: TOTAL
TURNING FROM BACK TO SIDE WHILE IN FLAT BAD: A LOT
STANDING UP FROM CHAIR USING ARMS: A LOT
MOVING FROM LYING ON BACK TO SITTING ON SIDE OF FLAT BED WITH BEDRAILS: A LITTLE
MOBILITY SCORE: 12

## 2025-03-14 ASSESSMENT — PAIN SCALES - GENERAL
PAINLEVEL_OUTOF10: 5 - MODERATE PAIN
PAINLEVEL_OUTOF10: 3
PAINLEVEL_OUTOF10: 4
PAINLEVEL_OUTOF10: 4

## 2025-03-14 ASSESSMENT — PAIN - FUNCTIONAL ASSESSMENT: PAIN_FUNCTIONAL_ASSESSMENT: 0-10

## 2025-03-14 ASSESSMENT — PAIN DESCRIPTION - ORIENTATION: ORIENTATION: LEFT

## 2025-03-14 ASSESSMENT — PAIN DESCRIPTION - LOCATION: LOCATION: LEG

## 2025-03-14 NOTE — CARE PLAN
The clinical goals for the shift include patient remains safe throughout shift    Problem: Discharge Planning  Goal: Discharge to home or other facility with appropriate resources  Outcome: Met     Problem: Fall/Injury  Goal: Not fall by end of shift  Outcome: Met  Goal: Be free from injury by end of the shift  Outcome: Met  Goal: Verbalize understanding of personal risk factors for fall in the hospital  Outcome: Met  Goal: Verbalize understanding of risk factor reduction measures to prevent injury from fall in the home  Outcome: Met  Goal: Use assistive devices by end of the shift  Outcome: Met  Goal: Pace activities to prevent fatigue by end of the shift  Outcome: Met

## 2025-03-14 NOTE — HH CARE COORDINATION
This referral has been made a Non Admit with  Home Care due to Patient is discharging to a Post-Acute Care Facility. If you have further questions, feel free to reach out to our office at [phone]. Thank you, [facility] Intake.

## 2025-03-14 NOTE — PROGRESS NOTES
Physical Therapy    Physical Therapy Treatment    Patient Name: Nuris Squires  MRN: [id number]  Department: [place] [address]  Room: [address] [place] [address]  Today's Date: 3/14/2025  Time Calculation  Start Time: 0933  Stop Time: 0956  Time Calculation (min): 23 min         Assessment/Plan   PT Assessment  Barriers to Discharge Home: Caregiver assistance, Physical needs  Caregiver Assistance: Caregiver assistance needed per identified barriers - however, level of patient's required assistance exceeds assistance available at home  Physical Needs: Stair navigation into home limited by function/safety, Ambulating household distances limited by function/safety, High falls risk due to function or environment  End of Session Communication: Bedside nurse  Assessment Comment: Pt tolerated PT session well, able to progress steps to forward/retro ambulation within room. Pt continues to remain appropriate for Moderate intensity PT upon D/C from hospital.  End of Session Patient Position: Up in chair, Alarm on  PT Plan  Inpatient/Swing Bed or Outpatient: Inpatient  PT Plan  Treatment/Interventions: Transfer training, Bed mobility, Gait training, Stair training, Balance training, Strengthening  PT Plan: Ongoing PT  PT Frequency: 5 times per week  PT Discharge Recommendations: Moderate intensity level of continued care  PT Recommended Transfer Status: Assist x1, Assistive device  PT - OK to Discharge: Yes      General Visit Information:   PT  Visit  PT Received On: 03/14/25  General  Missed Visit Reason:  (n/a)  Family/Caregiver Present: No  Prior to Session Communication: Bedside nurse  Patient Position Received: Bed, 3 rail up, Alarm on  General Comment: Pt supine in bed, agreeable to work with PT.    Subjective   Precautions:  Precautions  LE Weight Bearing Status: Weight Bearing as Tolerated (LLE)  Medical Precautions: Fall precautions      Objective   Pain:  Pain Assessment  Pain Assessment: 0-10  0-10 (Numeric) Pain Score: 4  Pain Type: Surgical  pain  Pain Location: Leg  Pain Orientation: Left  Cognition:  Cognition  Overall Cognitive Status: Within Functional Limits  Orientation Level: Oriented X4    Activity Tolerance:  Activity Tolerance  Endurance: Tolerates 10 - 20 min exercise with multiple rests  Treatments:  Bed Mobility  Bed Mobility: Yes  Bed Mobility 1  Bed Mobility 1: Supine to sitting  Level of Assistance 1: Moderate assistance, Minimum assistance  Bed Mobility Comments 1: HOB elevated, continues to require assist with LLE to advance OOB  Bed Mobility 2  Bed Mobility  2: Scooting  Level of Assistance 2: Contact guard    Ambulation/Gait Training  Ambulation/Gait Training Performed: Yes  Ambulation/Gait Training 1  Surface 1: Level tile  Device 1: Rolling walker  Assistance 1: Moderate assistance, Minimum assistance, Minimal verbal cues  Quality of Gait 1: Wide base of support, Diminished heel strike, Decreased step length, Antalgic (Decreaed cortney,)  Comments/Distance (ft) 1: x5ft laterally towards chair, x4ft fwd/retro, improved LE advancing and weight shifting on LLE to advance RLE. Pt with some unsteadiness throughout ambulation however progress towards forward steps this date.    Transfers  Transfer: Yes  Transfer 1  Transfer From 1: Bed to  Transfer to 1: Stand  Technique 1: Sit to stand  Transfer Device 1: Walker  Transfer Level of Assistance 1: Moderate assistance, Minimal verbal cues  Trials/Comments 1: x1 trial, cues for sequencing  Transfers 2  Transfer From 2: Chair with arms to  Transfer to 2: Stand  Technique 2: Sit to stand  Transfer Device 2: Walker  Transfer Level of Assistance 2: Moderate assistance, Minimal verbal cues  Trials/Comments 2: x1 trial, cues for sequencing  Transfers 3  Transfer From 3: Stand to  Transfer to 3: Chair with arms  Technique 3: Stand to sit  Transfer Device 3: Walker  Transfer Level of Assistance 3: Minimum assistance, Minimal verbal cues  Trials/Comments 3: x2 trials, cues for kicking out LLE prior  to sitting.    Stairs  Stairs: No    Outcome Measures:  [facility] Basic Mobility  Turning from your back to your side while in a flat bed without using bedrails: A little  Moving from lying on your back to sitting on the side of a flat bed without using bedrails: A lot  Moving to and from bed to chair (including a wheelchair): A lot  Standing up from a chair using your arms (e.g. wheelchair or bedside chair): A lot  To walk in hospital room: A lot  Climbing 3-5 steps with railing: Total  Basic Mobility - Total Score: 12    Education Documentation  Precautions, taught by Lynne Khanna PTA at 3/14/2025 12:01 PM.  Learner: Patient  Readiness: Acceptance  Method: Explanation  Response: Verbalizes Understanding  Comment: progression of mobility, increasing OOB activity with RN staff    Mobility Training, taught by Lynne Khanna PTA at 3/14/2025 12:01 PM.  Learner: Patient  Readiness: Acceptance  Method: Explanation  Response: Verbalizes Understanding  Comment: progression of mobility, increasing OOB activity with RN staff    Education Comments  No comments found.        OP EDUCATION:       Encounter Problems       Encounter Problems (Active)       Balance       No LOB with transfers/ambulation  (Progressing)       Start:  03/11/25    Expected End:  04/01/25               Mobility       STG - Patient will ambulate 75 feet with walker modified independent  (Progressing)       Start:  03/11/25    Expected End:  04/01/25            STG - Patient will ascend and descend six stairs with LRD modified independent  (Not Progressing)       Start:  03/11/25    Expected End:  04/01/25               PT Transfers       STG - Patient will perform bed mobility independent  (Progressing)       Start:  03/11/25    Expected End:  04/01/25            STG - Patient will transfer sit to and from stand with walker modified independent  (Progressing)       Start:  03/11/25    Expected End:  04/01/25               Pain - Adult             03/14/25 at 12:02 PM   Lynne Khanna, \[facility] [place] [facility]\""   Rehab Office: [phone]

## 2025-03-14 NOTE — PROGRESS NOTES
"Orthopaedic Surgery Progress Note    S: No acute events overnight. Vitals stable, pain controlled. BG slightly above optimal range, will increase sliding scale. Pending SNF precert.    O:  /85 (BP Location: Right arm, Patient Position: Lying)   Pulse 71   Temp 36.2 °C (97.2 °F) (Temporal)   Resp 20   Ht 1.575 m (5' 2.01\")   Wt 103 kg (227 lb 1.2 oz)   SpO2 95%   BMI 41.52 kg/m²     Exam:  Gen: arousable, NAD, appropriately conversational  Cardiac: RRR to peripheral palpation  Resp: nonlabored on RA  GI: soft, nondistended    MSK:  LLE  - Surgical dressing c/d/i  - Fires DF/PF/EHL/FHL  - SILT in saph/sural/SPN/DPN distributions  - Foot warm, well perfused  - DP/PT pulse, brisk cap refill  - Compartments soft and compressible      Ortho Post Op Plan     59F with prior 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 Rodriguez on 3/10     Plan:  - Weight bearing: WBAT LLE  - DVT ppx: SCDs, ASA 81mg BID  - Diet: Regular  - Pain: Tylenol, oxycodone 5/10, dilaudid PRN for pain management  - Antibiotics: perioperative abx x 3  - Bowel Regimen: Colace, senna, dulcolax   - PT/OT consult  - Pulm: Encourage IS  - Continue home medications  - chronic indwelling catheter in place  - Increase sliding scale     Dispo: pending SNF placement       Jessica Nation, PGY-2  Orthopedic Surgery Resident  Available via [employer]    This patient will be followed by Ortho Trauma team (All chat preferred):    1st call: Kobe Adams, PGY-1  2nd call: Jessica Nation PGY-2  3rd call: Pito Plata PGY-3    On weekends and after 6PM:  [place] [facility]: Please reach out to the orthopaedic on-call resident ([phone])  At [facility]: Please reach out to the orthopaedic on-call PHYLLIS or resident (please refer to Qgenda)      "

## 2025-03-14 NOTE — DISCHARGE SUMMARY
Discharge Diagnosis  Closed bicondylar fracture of distal femur, left, with nonunion, subsequent encounter    Issues Requiring Follow-Up  Status post L femur nonunion nail exchange    Test Results Pending At Discharge  Pending Labs       No current pending labs.            Hospital Course   Nuris Squires is a 59 y.o. female who presented with left femur nonunion. Patient is now s/p left femur nonunion nail exchange on 3/14 by Dr. Rodriguez. On the day of surgery, patient was identified in the pre-operative holding area and agreeable to proceed with surgery. Written consent was obtained.  Please see operative note for further details of this procedure. Patient received 24 hours of selene-operative antibiotics. Patient recovered in the PACU before transfer to a regular nursing floor. Patient was started on oxycodone, tylenol, and Dilaudid for pain control and ASA 81 mg bid for DVT prophylaxis. Physical therapy recommended continued recovery at SNF with continued physical therapy and wound care. On the day of discharge, patient was afebrile with stable vital signs. Patient was neurovascularly intact at time of discharge. Patient was discharged with prescription of ASA 81 mg bid for DVT prophylaxis for 4 weeks. Patient will follow-up with Dr. Rodriguez in 3 weeks for post-operative visit.      Pertinent Physical Exam At Time of Discharge  Gen: arousable, NAD, appropriately conversational  Cardiac: RRR to peripheral palpation  Resp: nonlabored on RA  GI: soft, nondistended     MSK:  LLE  - Surgical dressing c/d/i  - Fires DF/PF/EHL/FHL  - SILT in saph/sural/SPN/DPN distributions  - Foot warm, well perfused  - DP/PT pulse, brisk cap refill  - Compartments soft and compressible    Home Medications     Medication List      START taking these medications     docusate sodium 100 mg capsule; Commonly known as: Colace; Take 1   capsule (100 mg) by mouth 2 times a day as needed for constipation for up   to 15 days.   ergocalciferol  1250 mcg (50,000 units) capsule; Commonly known as:   Vitamin D-2; Take 1 capsule (1,250 mcg) by mouth 1 (one) time per week.;   Start taking on: March 16, 2025   oxyCODONE 5 mg immediate release tablet; Commonly known as: Roxicodone;   Take 1 tablet (5 mg) by mouth every 4 hours if needed for severe pain (7 -   10) or moderate pain (4 - 6).     CHANGE how you take these medications     acetaminophen 325 mg tablet; Commonly known as: Tylenol; Take 2 tablets   (650 mg) by mouth every 6 hours if needed for mild pain (1 - 3) for up to   15 days.; What changed: when to take this   aspirin 81 mg EC tablet; Take 1 tablet (81 mg) by mouth 2 times a day.;   What changed: when to take this     CONTINUE taking these medications     atorvastatin 40 mg tablet; Commonly known as: Lipitor; Take 1 tablet (40   mg) by mouth once daily.   B complex-vitamin C-folic acid 0.8 mg tablet; Commonly known as:   Nephro-Duke; Take 1 tablet by mouth once daily.   Basaglar KwikPen U-100 Insulin 100 unit/mL (3 mL) pen; Generic drug:   insulin glargine; Inject 15 Units under the skin once daily in the   morning. Take as directed per insulin instructions.   carvedilol 12.5 mg tablet; Commonly known as: Coreg; Take 1 tablet (12.5   mg) by mouth 2 times a day.   ferrous sulfate tablet; Take 1 tablet (325 mg) by mouth once daily with   breakfast.   FreeStyle Remington 3 [place] misc; Generic drug: blood-glucose   meter,continuous; Use as instructed to monitor blood glucose.   FreeStyle Remington 3 Sensor device; Generic drug: blood-glucose sensor; Use   to monitor blood glucose. Change sensor every 14 days.   gabapentin 300 mg capsule; Commonly known as: Neurontin; Take 1 capsule   (300 mg) by mouth 2 times a day.   hydrALAZINE 25 mg tablet; Commonly known as: Apresoline   insulin lispro 100 unit/mL pen; Commonly known as: Admelog SoloStar   U-100 Insulin; Inject per sliding scale instructions under the skin 3   times a day with meals. (Max daily dose 70  "units)   isosorbide mononitrate ER 30 mg 24 hr tablet; Commonly known as: Imdur;   Take 1 tablet (30 mg) by mouth once daily. Do not crush or chew.   Mounjaro 2.5 mg/0.5 mL pen injector; Generic drug: tirzepatide; Inject   2.5 mg under the skin every 7 days.   OneTouch Delica Plus Lancet 30 gauge misc; Generic drug: lancets; USE TO   TEST BLOOD SUGAR AS DIRECTED   OneTouch Ultra Test strip; Generic drug: blood sugar diagnostic; Use 1   strip BID to check glucose   OneTouch Ultra2 Meter misc; Generic drug: blood-glucose meter; use 1 to   2 times daily   pen needle, diabetic 32 gauge x 5/32\" needle; Use four times a day with   insulin use   torsemide 20 mg tablet; Commonly known as: Demadex; Take 2 tablets (40   mg) by mouth once daily as needed (Please take one tab daily if you   experience any of the following: increased shortness of breath, increased   swelling of your lower extremities, or sudden weight gain of 3lbs or more   in a few days, or 5lbs or more in a week).       Outpatient Follow-Up  Future Appointments   Date Time Provider Department Center   3/18/2025 11:45 AM UROLOGY [id number] URODYNAMICS [id number] [facility]   3/20/2025  9:20 AM [facility]   3/21/2025 11:20 AM [facility] [id number] [facility]   4/2/2025 10:00 AM Barrera Rodriguez MD [id number] [facility]   5/15/2025 10:40 AM Marylin Sparks MD MPH [id number] [facility]   5/19/2025  2:20 PM She Brown MD [id number] [facility]   5/20/2025  1:30 PM Mone Aquino MD [id number] [facility]       Jessica Nation MD  "

## 2025-03-18 ENCOUNTER — APPOINTMENT (OUTPATIENT)
Dept: UROLOGY | Facility: CLINIC | Age: 60
End: 2025-03-18
Payer: COMMERCIAL

## 2025-03-18 DIAGNOSIS — Z91.89 AT RISK FOR UTI RELATED TO INDWELLING CATHETER: ICD-10-CM

## 2025-03-18 DIAGNOSIS — N39.0 URINARY TRACT INFECTION ASSOCIATED WITH INDWELLING URETHRAL CATHETER, SUBSEQUENT ENCOUNTER: ICD-10-CM

## 2025-03-18 DIAGNOSIS — T83.511D URINARY TRACT INFECTION ASSOCIATED WITH INDWELLING URETHRAL CATHETER, SUBSEQUENT ENCOUNTER: ICD-10-CM

## 2025-03-18 DIAGNOSIS — R39.15 URGENCY OF URINATION: Primary | ICD-10-CM

## 2025-03-18 LAB
POC APPEARANCE, URINE: ABNORMAL
POC BILIRUBIN, URINE: NEGATIVE
POC BLOOD, URINE: ABNORMAL
POC COLOR, URINE: YELLOW
POC GLUCOSE, URINE: ABNORMAL MG/DL
POC KETONES, URINE: NEGATIVE MG/DL
POC LEUKOCYTES, URINE: ABNORMAL
POC NITRITE,URINE: POSITIVE
POC PH, URINE: 5.5 PH
POC PROTEIN, URINE: ABNORMAL MG/DL
POC SPECIFIC GRAVITY, URINE: 1.02
POC UROBILINOGEN, URINE: 0.2 EU/DL

## 2025-03-18 PROCEDURE — 81003 URINALYSIS AUTO W/O SCOPE: CPT | Performed by: NURSE PRACTITIONER

## 2025-03-18 NOTE — RESULT ENCOUNTER NOTE
Thanks, Aldo Edwrads, her appointment with Cristin needs to be cancelled since that was to go over UDS results & have her follow up with us instead.

## 2025-03-18 NOTE — PROGRESS NOTES
Nuris Squires 60 y/o female     Patient was referred by Gale Rowan to evaluate urinary urgency.      Dr. Braga was present in the office at time of study.      Pre uroflow was not completed today patient presents with a catheter.  Urine was not clear for UTI.      Patient will follow up with Gale Rowan.  03/18/25/ CM

## 2025-03-20 ENCOUNTER — APPOINTMENT (OUTPATIENT)
Dept: UROLOGY | Facility: HOSPITAL | Age: 60
End: 2025-03-20
Payer: COMMERCIAL

## 2025-03-21 ENCOUNTER — APPOINTMENT (OUTPATIENT)
Dept: PHARMACY | Facility: HOSPITAL | Age: 60
End: 2025-03-21
Payer: COMMERCIAL

## 2025-03-21 ENCOUNTER — TELEPHONE (OUTPATIENT)
Dept: UROLOGY | Facility: CLINIC | Age: 60
End: 2025-03-21

## 2025-03-21 RX ORDER — LEVOFLOXACIN 500 MG/1
500 TABLET, FILM COATED ORAL DAILY
Qty: 7 TABLET | Refills: 0 | Status: SHIPPED | OUTPATIENT
Start: 2025-03-21 | End: 2025-03-28

## 2025-03-21 NOTE — TELEPHONE ENCOUNTER
Patient calling in about prescription that she received at her appt earlier this week. Stated the pharmacy didn't receive it and the UTI is getting worse. Call back [phone]

## 2025-03-21 NOTE — PROGRESS NOTES
Results for urine culture not available 3 days after sent to lab. Spoke to [facility] representative who indicates that testing is taking longer than usual due to catheter as source of specimen. Preliminary c/s released into Epic now.    Spoke with patient to advise of preliminary results. She reports some associated pain. Reviewed plan for levofloxacin based on susceptibilities for E coli. Appreciative of information.

## 2025-03-22 LAB — BACTERIA UR CULT: ABNORMAL

## 2025-03-24 ENCOUNTER — TELEPHONE (OUTPATIENT)
Dept: UROLOGY | Facility: CLINIC | Age: 60
End: 2025-03-24
Payer: COMMERCIAL

## 2025-03-24 NOTE — TELEPHONE ENCOUNTER
Received call from K[facility] at [facility], [phone] has some clinical questions, patient has an upcoming appt on 4/8

## 2025-03-25 ENCOUNTER — DOCUMENTATION (OUTPATIENT)
Dept: PRIMARY CARE | Facility: CLINIC | Age: 60
End: 2025-03-25
Payer: COMMERCIAL

## 2025-04-02 ENCOUNTER — HOSPITAL ENCOUNTER (OUTPATIENT)
Dept: RADIOLOGY | Facility: HOSPITAL | Age: 60
Discharge: HOME | End: 2025-04-02
Payer: COMMERCIAL

## 2025-04-02 ENCOUNTER — APPOINTMENT (OUTPATIENT)
Dept: RADIOLOGY | Facility: HOSPITAL | Age: 60
End: 2025-04-02
Payer: COMMERCIAL

## 2025-04-02 ENCOUNTER — APPOINTMENT (OUTPATIENT)
Dept: NEPHROLOGY | Facility: CLINIC | Age: 60
End: 2025-04-02
Payer: COMMERCIAL

## 2025-04-02 ENCOUNTER — OFFICE VISIT (OUTPATIENT)
Dept: ORTHOPEDIC SURGERY | Facility: HOSPITAL | Age: 60
End: 2025-04-02
Payer: COMMERCIAL

## 2025-04-02 DIAGNOSIS — S72.432K CLOSED BICONDYLAR FRACTURE OF DISTAL FEMUR, LEFT, WITH NONUNION, SUBSEQUENT ENCOUNTER: Primary | ICD-10-CM

## 2025-04-02 DIAGNOSIS — S72.422K CLOSED BICONDYLAR FRACTURE OF DISTAL FEMUR, LEFT, WITH NONUNION, SUBSEQUENT ENCOUNTER: ICD-10-CM

## 2025-04-02 DIAGNOSIS — S72.422K CLOSED BICONDYLAR FRACTURE OF DISTAL FEMUR, LEFT, WITH NONUNION, SUBSEQUENT ENCOUNTER: Primary | ICD-10-CM

## 2025-04-02 DIAGNOSIS — S72.432K CLOSED BICONDYLAR FRACTURE OF DISTAL FEMUR, LEFT, WITH NONUNION, SUBSEQUENT ENCOUNTER: ICD-10-CM

## 2025-04-02 PROCEDURE — 1036F TOBACCO NON-USER: CPT | Performed by: ORTHOPAEDIC SURGERY

## 2025-04-02 PROCEDURE — 99211 OFF/OP EST MAY X REQ PHY/QHP: CPT | Performed by: ORTHOPAEDIC SURGERY

## 2025-04-02 PROCEDURE — 73560 X-RAY EXAM OF KNEE 1 OR 2: CPT | Mod: LEFT SIDE | Performed by: RADIOLOGY

## 2025-04-02 PROCEDURE — 73560 X-RAY EXAM OF KNEE 1 OR 2: CPT | Mod: LT

## 2025-04-02 PROCEDURE — 73552 X-RAY EXAM OF FEMUR 2/>: CPT | Mod: LT

## 2025-04-02 PROCEDURE — 73552 X-RAY EXAM OF FEMUR 2/>: CPT | Mod: LEFT SIDE | Performed by: RADIOLOGY

## 2025-04-02 PROCEDURE — 3052F HG A1C>EQUAL 8.0%<EQUAL 9.0%: CPT | Performed by: ORTHOPAEDIC SURGERY

## 2025-04-02 PROCEDURE — 3060F POS MICROALBUMINURIA REV: CPT | Performed by: ORTHOPAEDIC SURGERY

## 2025-04-02 NOTE — PROGRESS NOTES
Nuris Squires is  post-op from intramedullary nail and ORIF left distal femur fracture on 4/16/2024. Status post fracture nonunion repair on 3/10/2025. she is doing well at this point.  Pain is well controlled  Denies fevers or chills.  Denies drainage from the wound.  she reports no additional symptoms or concerns. No shortness of breath or chest pain. No calf swelling or pain.    The patients full medical history, surgical history, medications, allergies, family, medical history, social history, and a complete 14 point review of systems is documented in the medical record on the signed, scanned medical intake sheet or reviewed in the history of present illness. 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 Sig Documenting Provider Last Dose Status   acetaminophen (Tylenol) 325 mg tablet [id number] Yes Take 2 tablets (650 mg) by mouth every 6 hours if needed for mild pain (1 - 3) for up to 15 days. Jessica Nation MD  Active     Discontinued 03/14/25 1521   aspirin 81 mg EC tablet [id number] Yes Take 1 tablet (81 mg) by mouth 2 times a day. Jessica Nation MD  Active   atorvastatin (Lipitor) 40 mg tablet [id number] Yes Take 1 tablet (40 mg) by mouth once daily. Betty Martin, APRN-CNP  Active   B complex-vitamin C-folic acid (Nephro-Duke) 0.8 mg tablet [id number] Yes Take 1 tablet by mouth once daily. Mone Aquino MD  Active   blood-glucose sensor (FreeStyle  Remington 3 Sensor) device [id number] Yes Use to monitor blood glucose. Change sensor every 14 days. Mone Aquino MD  Active   carvedilol (Coreg) 12.5 mg tablet [id number] Yes Take 1 tablet (12.5 mg) by mouth 2 times a day. SEDA Finney  Active   docusate sodium (Colace) 100 mg capsule [id number] Yes Take 1 capsule (100 mg) by mouth 2 times a day as needed for constipation for up to 15 days. Jessica Nation MD  Active   ergocalciferol (Vitamin D-2) 1250 mcg (50,000 units) capsule [id number] Yes Take 1 capsule (1,250 mcg) by mouth 1 (one) time per week. Jessica Nation MD  Active   ferrous sulfate tablet [id number] Yes Take 1 tablet (325 mg) by mouth once daily with breakfast. Mone Aquino MD  Active   FreeStyle Remington 3 [place] misc [id number] Yes Use as instructed to monitor blood glucose. Mone Aquino MD  Active   gabapentin (Neurontin) 300 mg capsule [id number] Yes Take 1 capsule (300 mg) by mouth 2 times a day. Mone Aquino MD  Active   hydrALAZINE (Apresoline) 25 mg tablet [id number] Yes Take 1 tablet (25 mg) by mouth 3 times a day. Nina Cunha MD  Active   insulin glargine (Basaglar KwikPen U-100 Insulin) 100 unit/mL (3 mL) pen [id number] Yes Inject 15 Units under the skin once daily in the morning. Take as directed per insulin instructions. SEDA Finney  Active   insulin lispro (Admelog SoloStar U-100 Insulin) 100 unit/mL injection [id number] Yes Inject per sliding scale instructions under the skin 3 times a day with meals. (Max daily dose 70 units) Mone Aquino MD  Active   isosorbide mononitrate ER (Imdur) 30 mg 24 hr tablet [id number] Yes Take 1 tablet (30 mg) by mouth once daily. Do not crush or chew. Mone Aquino MD  Active   OneTouch Delica Plus Lancet 30 gauge misc [id number] Yes USE TO TEST BLOOD SUGAR AS DIRECTED Mone Aquino MD  Active   OneTouch Ultra Test strip [id number] Yes Use 1 strip BID to check glucose Mone Aquino MD  Active   OneTouch Ultra2 Meter misc  "[id number] Yes use 1 to 2 times daily Mone Aquino MD  Active     Discontinued 03/12/25 0654   oxyCODONE (Roxicodone) 5 mg immediate release tablet [id number] Yes Take 1 tablet (5 mg) by mouth every 4 hours if needed for severe pain (7 - 10) or moderate pain (4 - 6). Jessica Nation MD  Active   pen needle, diabetic 32 gauge x 5/32\" needle [id number] Yes Use four times a day with insulin use Mone Aquino MD  Active   tirzepatide (Mounjaro) 2.5 mg/0.5 mL pen injector [id number] Yes Inject 2.5 mg under the skin every 7 days. Mone Aquino MD  Active   torsemide (Demadex) 20 mg tablet [id number] Yes Take 2 tablets (40 mg) by mouth once daily as needed (Please take one tab daily if you experience any of the following: increased shortness of breath, increased swelling of your lower extremities, or sudden weight gain of 3lbs or more in a few days, or 5lbs or more in a week). Betty Martin, APRN-CNP  Active                    No Known Allergies    Social History     Socioeconomic History    Marital status: Single     Spouse name: Not on file    Number of children: Not on file    Years of education: Not on file    Highest education level: Not on file   Occupational History    Not on file   Tobacco Use    Smoking status: Never     Passive exposure: Never    Smokeless tobacco: Never   Vaping Use    Vaping status: Never Used   Substance and Sexual Activity    Alcohol use: Yes    Drug use: Never    Sexual activity: Defer   Other Topics Concern    Not on file   Social History Narrative    Not on file     Social Drivers of Health     Financial Resource Strain: Patient Declined (3/11/2025)    Overall Financial Resource Strain (CARDIA)     Difficulty of Paying Living Expenses: Patient declined   Food Insecurity: No Food Insecurity (1/2/2025)    Hunger Vital Sign     Worried About Running Out of Food in the Last Year: Never true     Ran Out of Food in the Last Year: Never true   Transportation Needs: No Transportation Needs " (3/11/2025)    PRAPARE - Transportation     Lack of Transportation (Medical): No     Lack of Transportation (Non-Medical): No   Physical Activity: Insufficiently Active (1/2/2025)    Exercise Vital Sign     Days of Exercise per Week: 2 days     Minutes of Exercise per Session: 20 min   Stress: No Stress Concern Present (1/25/2024)    [employer] - Occupational Stress Questionnaire     Feeling of Stress : Only a little   Social Connections: Feeling Socially Integrated (6/26/2024)    OASIS : Social Isolation     Frequency of experiencing loneliness or isolation: Never   Intimate Partner Violence: Not At Risk (1/2/2025)    Humiliation, Afraid, Rape, and Kick questionnaire     Fear of Current or Ex-Partner: No     Emotionally Abused: No     Physically Abused: No     Sexually Abused: No   Housing Stability: Low Risk  (3/11/2025)    Housing Stability Vital Sign     Unable to Pay for Housing in the Last Year: No     Number of Times Moved in the Last Year: 1     Homeless in the Last Year: No       Past Surgical History:   Procedure Laterality Date    CARDIAC CATHETERIZATION N/A 1/8/2025    Procedure: Right Heart Cath;  Surgeon: Migel Stanton MD;  Location: [place] [address] Cardiac Cath Lab;  Service: Cardiovascular;  Laterality: N/A;    CARDIAC CATHETERIZATION N/A 1/17/2025    Procedure: Right Heart Cath;  Surgeon: Ana Lea MD;  Location: [place] [address] Cardiac Cath Lab;  Service: Cardiovascular;  Laterality: N/A;    CT ANGIO NECK  1/25/2023    CT NECK ANGIO W AND WO IV CONTRAST 1/25/2023 [facility]CTOR OFFICE [facility]GAC[facility]    CT HEAD ANGIO W AND WO IV CONTRAST  1/25/2023    CT HEAD ANGIO W AND WO IV CONTRAST 1/25/2023 DOCTOR OFFICE LEGAC[facility]    OTHER SURGICAL HISTORY  10/21/2020    Toe amputation       Gen: The patient is alert and oriented ×3, is in no acute distress, and appear their stated age and weight.    Psychiatric: Mood and affect are appropriate.    Eyes: Sclera are white, and pupils are  round and symmetric.    ENT: Mucous membranes are moist.     Neck: Supple. Thyroid is midline.    Respiratory: Respirations are nonlabored, chest rise is symmetric.    Cardiac: Rate is regular by palpation of distal pulses.     Abdomen: Nondistended.    Integument: No obvious cutaneous lesions are noted. No signs of lymphangitis. No signs of systemic edema.  side: left lower extremity :  her  surgical incisions are healing well, without evidence of erythema, fluctuance, drainage, or infection.  The skin around the incision is intact.  Distally neurovascular exam is stable.  There is appropriate tenderness to palpation in the selene-incisional area. No calf swelling or tenderness to palpation.      I personally reviewed multiple views of left knee and femur were obtained in the office today demonstrate maintenance of reduction, interval healing, and a stable position of the hardware.  Additionally she has a lot of callus on the lateral side but not as much on the medial side concerning for nonunion.      Nuris Squires is a 59 y.o. female patient status post intramedullary nail and ORIF left distal femur fracture on 4/16/2024.  Status post fracture nonunion repair on 3/10/2025.  I went over her x-rays in detail today.   she is WBAT of the side: left lower extremity. ~He/she~ is range of motion as tolerated of the side: left lower extremity.  I stressed the importance of physical therapy on overall functional outcome.  To work on range of motion.  Stable removed in the office today.  I answered all patient's questions he agrees with treatment plan.  I will see her back in Follow-up in 8 weeks with 2 views of the left knee and 2 views left femur    Contact number [phone]    Barrera Rodriguez    Department of Orthopaedic Trauma Surgery

## 2025-04-04 PROBLEM — Z99.81 HISTORY OF HOME OXYGEN THERAPY: Status: RESOLVED | Noted: 2024-04-16 | Resolved: 2025-04-04

## 2025-04-04 PROBLEM — R42 VERTIGO: Status: RESOLVED | Noted: 2023-09-25 | Resolved: 2025-04-04

## 2025-04-04 PROBLEM — R26.9 ABNORMAL GAIT: Status: ACTIVE | Noted: 2024-04-26

## 2025-04-04 PROBLEM — S72.90XA CLOSED FRACTURE OF FEMUR (MULTI): Status: ACTIVE | Noted: 2024-04-15

## 2025-04-04 PROBLEM — L84 CORNS AND CALLOSITIES: Status: RESOLVED | Noted: 2019-06-02 | Resolved: 2025-04-04

## 2025-04-04 PROBLEM — M14.60 CHARCOT ARTHROPATHY: Status: ACTIVE | Noted: 2023-09-25

## 2025-04-04 PROBLEM — Z11.3 SCREENING EXAMINATION FOR VENEREAL DISEASE: Status: RESOLVED | Noted: 2024-10-08 | Resolved: 2025-04-04

## 2025-04-04 PROBLEM — R06.09 DYSPNEA ON EXERTION: Status: RESOLVED | Noted: 2024-01-25 | Resolved: 2025-04-04

## 2025-04-04 PROBLEM — L50.9 HIVES: Status: RESOLVED | Noted: 2024-05-17 | Resolved: 2025-04-04

## 2025-04-04 PROBLEM — B37.31 CANDIDIASIS OF VAGINA: Status: RESOLVED | Noted: 2024-10-08 | Resolved: 2025-04-04

## 2025-04-07 ENCOUNTER — APPOINTMENT (OUTPATIENT)
Dept: UROLOGY | Facility: CLINIC | Age: 60
End: 2025-04-07
Payer: COMMERCIAL

## 2025-04-08 ENCOUNTER — APPOINTMENT (OUTPATIENT)
Dept: UROLOGY | Facility: CLINIC | Age: 60
End: 2025-04-08
Payer: COMMERCIAL

## 2025-04-14 ENCOUNTER — APPOINTMENT (OUTPATIENT)
Dept: UROLOGY | Facility: CLINIC | Age: 60
End: 2025-04-14
Payer: COMMERCIAL

## 2025-04-14 DIAGNOSIS — R33.9 URINARY RETENTION: ICD-10-CM

## 2025-04-14 PROCEDURE — 51784 ANAL/URINARY MUSCLE STUDY: CPT | Performed by: STUDENT IN AN ORGANIZED HEALTH CARE EDUCATION/TRAINING PROGRAM

## 2025-04-14 PROCEDURE — 51741 ELECTRO-UROFLOWMETRY FIRST: CPT | Performed by: STUDENT IN AN ORGANIZED HEALTH CARE EDUCATION/TRAINING PROGRAM

## 2025-04-14 PROCEDURE — 51797 INTRAABDOMINAL PRESSURE TEST: CPT | Performed by: STUDENT IN AN ORGANIZED HEALTH CARE EDUCATION/TRAINING PROGRAM

## 2025-04-14 PROCEDURE — 51728 CYSTOMETROGRAM W/VP: CPT | Performed by: STUDENT IN AN ORGANIZED HEALTH CARE EDUCATION/TRAINING PROGRAM

## 2025-04-14 NOTE — PROGRESS NOTES
Nuris Squires 58 y/o female    Patient was referred by Gale Rowan to evaluate urinary retention.      Dr. Parekh was present in the office at time of study.      Pre uroflow was not completed today patient presents with a catheter.  Urine was clear for UTI.      Patient did not leak on CLPP/VLPP.  Patient did not have DO with leak.  Patient did urinate with a PVR after study of 0ml. Catheter did not have to be replaced.     Patient instructed to increase fluids if blood in the urine or burning due to cath insertion.  Patient understood and consented to Urodynamics.  Patient will follow up with Dr. Stanton to review results.  04/14/25/ CM

## 2025-04-16 ENCOUNTER — APPOINTMENT (OUTPATIENT)
Dept: PRIMARY CARE | Facility: CLINIC | Age: 60
End: 2025-04-16
Payer: COMMERCIAL

## 2025-04-17 ENCOUNTER — PATIENT OUTREACH (OUTPATIENT)
Dept: PRIMARY CARE | Facility: CLINIC | Age: 60
End: 2025-04-17
Payer: COMMERCIAL

## 2025-04-17 NOTE — PROGRESS NOTES
TCM completed 04/17/25   Discharge Facility: [facility]  Discharge Diagnosis: Closed bicondylar fracture of distal femur, left, with nonunion, subsequent encounter Status post L femur nonunion nail exchange.   Admission Date: 3/10/25  Discharge Date: 3/14/25   SNF= [facility] from 3/14/25- 4/16/25    PCP Appointment Date: 4/22/25       (Needs seen by: 4/29/25)  Specialist Appointment Date:   Cardiology-  5/15/25  Urology- 4/21/25  Nephrology- 5/19/25    Hospital Encounter and Summary Linked: Yes  Admission (Discharged) with Barrera Rodriguez MD (03/10/2025)                          --See discharge assessment below for further details--    Wrap Up  Wrap Up Additional Comments: TCM initial outreach post discharge completed successfully. Spoke with the patient who states she is doing okay today. Patient denied any acute changes/concerns in her condition since leaving the hospital. Patient denied any questions regarding discharge instructions, medication changes or her hospital stay.  TCM phone number was provided to the patient, with the patient encouraged to call back with any non-emergent questions or concerns. Patient verbalized her understanding and states she has no questions or concerns at this time, but will call back if needed. Patient has a PCP follow up appt scheduled and confirms she plans to keep this appt. Patient was encouraged to call back if she had any further issues with getting her updated medication list from the SNF. (4/17/2025  2:05 PM)  Call End Time: 1356 (4/17/2025  2:05 PM)    Engagement  Call Start Time: 1355 (4/17/2025  2:05 PM)    Medications  Medications reviewed with patient/caregiver?: Yes (Changes only) (4/17/2025  2:05 PM)  Is the patient having any side effects they believe may be caused by any medication additions or changes?: No (4/17/2025  2:05 PM)  Does the patient have all medications ordered at discharge?: No (4/17/2025  2:05 PM)  What is keeping the patient from  filling the prescriptions?: Medication reconciliation issue (Patient states she is not sure if she has all the medications she needs, but has been in contact with someone from the SNF who is supposed to be calling her back today.) (4/17/2025  2:05 PM)  Care Management Interventions: No intervention needed; Provided patient education (4/17/2025  2:05 PM)  Prescription Comments: START taking these medications:    Docusate sodium 100 mg capsule; Commonly known as: Colace; Take 1   capsule (100 mg) by mouth 2 times a day as needed for constipation for up to 15 days.   Ergocalciferol 1250 mcg (50,000 units) capsule; Commonly known as:   Vitamin D-2; Take 1 capsule (1,250 mcg) by mouth 1 (one) time per week.;   Start taking on: March 16, 2025   OxyCODONE 5 mg immediate release tablet; Commonly known as: Roxicodone; Take 1 tablet (5 mg) by mouth every 4 hours if needed for severe pain (7 - 10) or moderate pain (4 - 6).     CHANGE how you take these medications:     Acetaminophen 325 mg tablet; Commonly known as: Tylenol; Take 2 tablets (650 mg) by mouth every 6 hours if needed for mild pain (1 - 3) for up to 15 days.; What changed: when to take this   Aspirin 81 mg EC tablet; Take 1 tablet (81 mg) by mouth 2 times a day. (4/17/2025  2:05 PM)  Is the patient taking all medications as directed (includes completed medication regime)?: Yes (4/17/2025  2:05 PM)  Care Management Interventions: Provided patient education (4/17/2025  2:05 PM)    Appointments  Does the patient have a primary care provider?: Yes (4/17/2025  2:05 PM)  Care Management Interventions: Verified appointment date/time/provider (4/17/2025  2:05 PM)  Has the patient kept scheduled appointments due by today?: Not applicable (4/17/2025  2:05 PM)  Care Management Interventions: Advised patient to keep appointment; Educated on importance of keeping appointment (4/17/2025  2:05 PM)    Self Management  What is the home health agency?: n/a (4/17/2025  2:05 PM)  Has  home health visited the patient within 72 hours of discharge?: Not applicable (4/17/2025  2:05 PM)  What Durable Medical Equipment (DME) was ordered?: n/a (4/17/2025  2:05 PM)    Patient Teaching  Does the patient have access to their discharge instructions?: Yes (4/17/2025  2:05 PM)  Care Management Interventions: Reviewed instructions with patient (4/17/2025  2:05 PM)  What is the patient's perception of their health status since discharge?: Improving (4/17/2025  2:05 PM)  Is the patient/caregiver able to teach back the hierarchy of who to call/visit for symptoms/problems? PCP, Specialist, Home Health nurse, Urgent Care, ED, 911: Yes (4/17/2025  2:05 PM)  Patient/Caregiver Education Comments: Patient denied any questions, concerns or needs from TCM or her PCP at time of outreach (4/17/2025  2:05 PM)

## 2025-04-18 ENCOUNTER — TELEPHONE (OUTPATIENT)
Dept: HOME HEALTH SERVICES | Facility: HOME HEALTH | Age: 60
End: 2025-04-18
Payer: COMMERCIAL

## 2025-04-18 PROCEDURE — RXMED WILLOW AMBULATORY MEDICATION CHARGE

## 2025-04-18 NOTE — TELEPHONE ENCOUNTER
Mone Aquino MD      This patient was recently at [facility] 3/14/2025-4/16/2025   and was discharged home without [facility] orders being entered. If you are agreeable, please  orders in EPIC  under REF [id number]  so that we can schedule a nurse/therapist to go out and start services. Patient will require SN, PT, and OT  Per Medicare guidelines, the  SNF admission can be used as the F2F.?     Thank you,??     GERALD DUMONT LPN      Referral  ?

## 2025-04-19 ENCOUNTER — PHARMACY VISIT (OUTPATIENT)
Dept: PHARMACY | Facility: CLINIC | Age: 60
End: 2025-04-19
Payer: MEDICARE

## 2025-04-21 ENCOUNTER — APPOINTMENT (OUTPATIENT)
Age: 60
End: 2025-04-21
Payer: COMMERCIAL

## 2025-04-21 ENCOUNTER — HOME HEALTH ADMISSION (OUTPATIENT)
Dept: HOME HEALTH SERVICES | Facility: HOME HEALTH | Age: 60
End: 2025-04-21
Payer: COMMERCIAL

## 2025-04-21 ENCOUNTER — DOCUMENTATION (OUTPATIENT)
Dept: HOME HEALTH SERVICES | Facility: HOME HEALTH | Age: 60
End: 2025-04-21

## 2025-04-21 DIAGNOSIS — R33.9 URINARY RETENTION: Primary | ICD-10-CM

## 2025-04-21 PROCEDURE — 3060F POS MICROALBUMINURIA REV: CPT | Performed by: UROLOGY

## 2025-04-21 PROCEDURE — 3052F HG A1C>EQUAL 8.0%<EQUAL 9.0%: CPT | Performed by: UROLOGY

## 2025-04-21 PROCEDURE — 99213 OFFICE O/P EST LOW 20 MIN: CPT | Performed by: UROLOGY

## 2025-04-21 NOTE — HH CARE COORDINATION
Home Care received a Referral for Nursing, Physical Therapy, Occupational Therapy, and Home Health Aide. We have processed the referral for a Start of Care on 4/22-4/23.     If you have any questions or concerns, please feel free to contact us at [phone]. Follow the prompts, enter your five digit zip code, and you will be directed to your care team on [facility] 3.

## 2025-04-21 NOTE — PROGRESS NOTES
UROLOGIC FOLLOW-UP VISIT    Virtual or Telephone Consent    While technically available, technical difficulties precluded the use of audio/visual meeting; therefore, I performed this visit using a real-time audio only connection between Nuris Squires & Josue Stanton MD.    Verbal consent was requested and obtained from Nuris Squires on this date, 04/21/25 for a telehealth visit and the patient's location was confirmed at the time of the visit.     PROBLEM LIST:  Urinary retention      HISTORY OF PRESENT ILLNESS:   Nuris Squires is a 59 y.o. with DM2, HFpEF, CKD.  Kindly referred for hospital follow up & further evaluation and management of urinary retention. Louis placed while inpatient and unable to void at outpatient urology visit with 200c PVR. Reported frequency prior to hospitalization, ~q 2 hrs, voiding 4-5 x day, 3 x night. No hx UTI. No hematuria or dysuria.     Does have a history of DM which was previously uncontrolled but improving recently with insulin regimen.     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 PVR however was 0cc. Patient states that catheter was left out and she has been voiding normally since.     States that she has daily soft Bms. Prescribed stool softener which she has not taken. Ambulating at baseline.     PAST MEDICAL HISTORY:  Medical History[1]    PAST SURGICAL HISTORY:  Surgical History[2]     ALLERGIES:   Allergies[3]     MEDICATIONS:   Medications Ordered Prior to Encounter[4]     SOCIAL HISTORY:  Patient  reports that she has never smoked. She has never been exposed to tobacco smoke. She has never used smokeless tobacco. She reports current alcohol use. She reports that she does not use drugs.   Social History     Socioeconomic History    Marital status: Single     Spouse name: Not on file    Number of children: Not on file    Years of education: Not on file    Highest education level: Not  on file   Occupational History    Not on file   Tobacco Use    Smoking status: Never     Passive exposure: Never    Smokeless tobacco: Never   Vaping Use    Vaping status: Never Used   Substance and Sexual Activity    Alcohol use: Yes    Drug use: Never    Sexual activity: Defer   Other Topics Concern    Not on file   Social History Narrative    Not on file     Social Drivers of Health     Financial Resource Strain: Patient Declined (3/11/2025)    Overall Financial Resource Strain (CARDIA)     Difficulty of Paying Living Expenses: Patient declined   Food Insecurity: No Food Insecurity (1/2/2025)    Hunger Vital Sign     Worried About Running Out of Food in the Last Year: Never true     Ran Out of Food in the Last Year: Never true   Transportation Needs: No Transportation Needs (3/11/2025)    PRAPARE - Transportation     Lack of Transportation (Medical): No     Lack of Transportation (Non-Medical): No   Physical Activity: Insufficiently Active (1/2/2025)    Exercise Vital Sign     Days of Exercise per Week: 2 days     Minutes of Exercise per Session: 20 min   Stress: No Stress Concern Present (1/25/2024)    [employer] [place] [employer] - Occupational Stress Questionnaire     Feeling of Stress : Only a little   Social Connections: Feeling Socially Integrated (6/26/2024)    OASIS : Social Isolation     Frequency of experiencing loneliness or isolation: Never   Intimate Partner Violence: Not At Risk (1/2/2025)    Humiliation, Afraid, Rape, and Kick questionnaire     Fear of Current or Ex-Partner: No     Emotionally Abused: No     Physically Abused: No     Sexually Abused: No   Housing Stability: Low Risk  (3/11/2025)    Housing Stability Vital Sign     Unable to Pay for Housing in the Last Year: No     Number of Times Moved in the Last Year: 1     Homeless in the Last Year: No       FAMILY HISTORY:  Family History[5]    REVIEW OF SYSTEMS:  Negative except as reported above    PHYSICAL EXAM:  Unable to  "be performed     LABORATORY REVIEW:   Lab Results   Component Value Date    BUN 47 (H) 03/29/2025    CREATININE 2.77 (H) 03/29/2025    EGFR 19 (L) 03/29/2025     03/29/2025    K 5.3 03/29/2025     (H) 03/29/2025    CO2 23 03/29/2025    CALCIUM 8.6 03/29/2025      Lab Results   Component Value Date    WBC 6.2 03/29/2025    RBC 2.80 (L) 03/29/2025    HGB 8.4 (L) 03/29/2025    HCT 28.0 (L) 03/29/2025     03/29/2025    MCH 30.0 03/29/2025    MCHC 30.0 (L) 03/29/2025    RDW 15.5 (H) 03/29/2025     03/29/2025    MPV 9.6 02/05/2025        No results found for: \"PSA\"        Assessment:   Nuris Squires is a 59 y.o. with DM2, HFpEF, CKD.  Kindly referred for hospital follow up & further evaluation and management of urinary retention. Louis placed while inpatient and unable to void at outpatient urology visit with 200c PVR. 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 Louis has since been removed and she is voiding at baseline.      Plan:   - We will see her in person in 3 months and check a PVR and BMP at that time. Her Cr was elevated during hospitalization but has since returned to baseline.   - Encouraged continued control of her blood glucose   - Encouraged use of stool softener if not having daily soft BMs.         [1]   Past Medical History:  Diagnosis Date    Hypertensive heart and kidney disease with HF and with CKD stage IV 01/06/2025    Personal history of other diseases of the circulatory system     History of hypertension    Personal history of other endocrine, nutritional and metabolic disease     History of hyperlipidemia    Personal history of other endocrine, nutritional and metabolic disease     History of diabetes mellitus   [2]   Past Surgical History:  Procedure Laterality Date    CARDIAC CATHETERIZATION N/A 1/8/2025    Procedure: Right Heart Cath;  Surgeon: Migel Stanton MD;  Location: [place] [address] Cardiac " Cath Lab;  Service: Cardiovascular;  Laterality: N/A;    CARDIAC CATHETERIZATION N/A 1/17/2025    Procedure: Right Heart Cath;  Surgeon: Ana Lea MD;  Location: [place] [address] Cardiac Cath Lab;  Service: Cardiovascular;  Laterality: N/A;    CT ANGIO NECK  1/25/2023    CT NECK ANGIO W AND WO IV CONTRAST 1/25/2023 DOCTOR OFFICE LEGACY    CT HEAD ANGIO W AND WO IV CONTRAST  1/25/2023    CT HEAD ANGIO W AND WO IV CONTRAST 1/25/2023 DOCTOR OFFICE LEGACY    OTHER SURGICAL HISTORY  10/21/2020    Toe amputation   [3] No Known Allergies  [4]   Current Outpatient Medications on File Prior to Visit   Medication Sig Dispense Refill    aspirin 81 mg EC tablet Take 1 tablet (81 mg) by mouth 2 times a day. 84 tablet 0    atorvastatin (Lipitor) 40 mg tablet Take 1 tablet (40 mg) by mouth once daily. 30 tablet 1    B complex-vitamin C-folic acid (Nephro-Duke) 0.8 mg tablet Take 1 tablet by mouth once daily. 90 tablet 0    blood-glucose sensor (FreeStyle Remington 3 Sensor) device Use to monitor blood glucose. Change sensor every 14 days. 2 each 11    carvedilol (Coreg) 12.5 mg tablet Take 1 tablet (12.5 mg) by mouth 2 times a day. 60 tablet 1    ergocalciferol (Vitamin D-2) 1250 mcg (50,000 units) capsule Take 1 capsule (1,250 mcg) by mouth 1 (one) time per week. 8 capsule 0    ferrous sulfate tablet Take 1 tablet (325 mg) by mouth once daily with breakfast. 90 tablet 0    FreeStyle Remington 3 [place] misc Use as instructed to monitor blood glucose. 1 each 0    gabapentin (Neurontin) 300 mg capsule Take 1 capsule (300 mg) by mouth 2 times a day. 180 capsule 3    hydrALAZINE (Apresoline) 25 mg tablet Take 1 tablet (25 mg) by mouth 3 times a day.      insulin glargine (Basaglar KwikPen U-100 Insulin) 100 unit/mL (3 mL) pen Inject 15 Units under the skin once daily in the morning. Take as directed per insulin instructions.      insulin lispro (Admelog SoloStar U-100 Insulin) 100 unit/mL injection Inject per sliding scale  "instructions under the skin 3 times a day with meals. (Max daily dose 70 units) 15 mL 1    isosorbide mononitrate ER (Imdur) 30 mg 24 hr tablet Take 1 tablet (30 mg) by mouth once daily. Do not crush or chew. 30 tablet 3    OneTouch Delica Plus Lancet 30 gauge misc USE TO TEST BLOOD SUGAR AS DIRECTED 200 each 0    OneTouch Ultra Test strip Use 1 strip BID to check glucose 200 strip 1    OneTouch Ultra2 Meter misc use 1 to 2 times daily 1 each 0    oxyCODONE (Roxicodone) 5 mg immediate release tablet Take 1 tablet (5 mg) by mouth every 4 hours if needed for severe pain (7 - 10) or moderate pain (4 - 6). 42 tablet 0    pen needle, diabetic 32 gauge x 5/32\" needle Use four times a day with insulin use 360 each 1    tirzepatide (Mounjaro) 2.5 mg/0.5 mL pen injector Inject 2.5 mg under the skin every 7 days. 2 mL 3    torsemide (Demadex) 20 mg tablet Take 2 tablets (40 mg) by mouth once daily as needed (Please take one tab daily if you experience any of the following: increased shortness of breath, increased swelling of your lower extremities, or sudden weight gain of 3lbs or more in a few days, or 5lbs or more in a week). 30 tablet 1     No current facility-administered medications on file prior to visit.   [5]   Family History  Problem Relation Name Age of Onset    Alzheimer's disease Mother      Diabetes Mother      Lung cancer Mother      Diabetes Father      Lung cancer Father      Pancreatic cancer Father      Diabetes Sister      Lung cancer Sister       "

## 2025-04-22 ENCOUNTER — APPOINTMENT (OUTPATIENT)
Dept: PRIMARY CARE | Facility: CLINIC | Age: 60
End: 2025-04-22
Payer: COMMERCIAL

## 2025-04-23 ENCOUNTER — HOME CARE VISIT (OUTPATIENT)
Dept: HOME HEALTH SERVICES | Facility: HOME HEALTH | Age: 60
End: 2025-04-23
Payer: COMMERCIAL

## 2025-04-23 VITALS
BODY MASS INDEX: 40.67 KG/M2 | WEIGHT: 221 LBS | HEART RATE: 64 BPM | SYSTOLIC BLOOD PRESSURE: 188 MMHG | TEMPERATURE: 97.4 F | RESPIRATION RATE: 18 BRPM | DIASTOLIC BLOOD PRESSURE: 92 MMHG | OXYGEN SATURATION: 97 % | HEIGHT: 62 IN

## 2025-04-23 PROCEDURE — G0299 HHS/HOSPICE OF RN EA 15 MIN: HCPCS

## 2025-04-23 ASSESSMENT — ENCOUNTER SYMPTOMS
LAST BOWEL MOVEMENT: 67317
DENIES PAIN: 1
CHANGE IN APPETITE: UNCHANGED
APPETITE LEVEL: GOOD
MUSCLE WEAKNESS: 1
BOWEL PATTERN NORMAL: 1

## 2025-04-23 ASSESSMENT — ACTIVITIES OF DAILY LIVING (ADL)
ENTERING_EXITING_HOME: MAXIMUM ASSIST
OASIS_M1830: 05

## 2025-04-24 ENCOUNTER — HOME CARE VISIT (OUTPATIENT)
Dept: HOME HEALTH SERVICES | Facility: HOME HEALTH | Age: 60
End: 2025-04-24
Payer: COMMERCIAL

## 2025-04-24 NOTE — HOME HEALTH
[facility] HOMECARE SERVICES SDOH  SDOH RESOURCE NAME: [facility]/Affordable housing options  RESOURCE CONTACT:      KEYANNA( )  SHARRI( )  JOSIE( )  [employer] BANK( )  DIGITAL/CONNECTION( )  HOUSING(X )  TRANSPORATION( )  SOCIAL CONNECTIONS(x )  STRESS/SUPPORT(x )  UTLILITIES( )  DEPRESSION( )  DRUG/ALCOHOL/TOBACCO ( )  FINANCIAL STRAIN( )  FOOD INSECURITY( )  PASSPORT( )  PHONE/ADP PROGRAM( )  HOUSEHOLD/FURNITURE( )  ASSIT.LIVING( )     OTHER(x ) Referral sent to [facility] for free fire alarms and installation    FOLLOW UP: YES (x ) NO( ) During pt telephone encounter Home visit was scheduled for Monday April 28th, 2025 for housing resource assitance per patient.  ADDTIONAL COMMENT:

## 2025-04-25 ENCOUNTER — APPOINTMENT (OUTPATIENT)
Dept: PHARMACY | Facility: HOSPITAL | Age: 60
End: 2025-04-25
Payer: COMMERCIAL

## 2025-04-25 ENCOUNTER — HOME CARE VISIT (OUTPATIENT)
Dept: HOME HEALTH SERVICES | Facility: HOME HEALTH | Age: 60
End: 2025-04-25
Payer: COMMERCIAL

## 2025-04-25 PROCEDURE — G0151 HHCP-SERV OF PT,EA 15 MIN: HCPCS

## 2025-04-25 SDOH — HEALTH STABILITY: PHYSICAL HEALTH: PHYSICAL EXERCISE: 10

## 2025-04-25 SDOH — ECONOMIC STABILITY: HOUSING INSECURITY
HOME SAFETY: THERAPIST DISCUSSED WITH PATIENT AND CAREGIVER IMPORTANCE OF HOME SAFETY, SPECIFICALLY FOR CLUTTER FREE WALKING PATHWAYS AND ADEQUATE LIGHTING

## 2025-04-25 SDOH — HEALTH STABILITY: PHYSICAL HEALTH: EXERCISE TYPE: SEATED HEP

## 2025-04-25 SDOH — HEALTH STABILITY: PHYSICAL HEALTH: EXERCISE COMMENTS: SEATED HEP INCLUDES ANKLE PUMPS, LAQS, HIP FLEXION, AND HEELSLIDES ON LEFT LE

## 2025-04-25 SDOH — HEALTH STABILITY: PHYSICAL HEALTH: EXERCISE ACTIVITIES SETS: 1

## 2025-04-25 SDOH — HEALTH STABILITY: PHYSICAL HEALTH: EXERCISE ACTIVITY: SEATED HEP

## 2025-04-25 SDOH — ECONOMIC STABILITY: HOUSING INSECURITY: OPEN FLAME PRESENT: 1

## 2025-04-25 SDOH — HEALTH STABILITY: PHYSICAL HEALTH: PHYSICAL EXERCISE: SITTING

## 2025-04-25 ASSESSMENT — ENCOUNTER SYMPTOMS
PAIN: NO PAIN REPORTED TODAY
DEPRESSION: 0
OCCASIONAL FEELINGS OF UNSTEADINESS: 1
ARTHRALGIAS: 1
DENIES PAIN: 1
LOSS OF SENSATION IN FEET: 1
LIMITED RANGE OF MOTION: 1
MUSCLE WEAKNESS: 1

## 2025-04-25 ASSESSMENT — ACTIVITIES OF DAILY LIVING (ADL)
PHYSICAL TRANSFERS ASSESSED: 1
AMBULATION ASSISTANCE ON FLAT SURFACES: 1
AMBULATION ASSISTANCE: 1
AMBULATION ASSISTANCE: STAND BY ASSIST
CURRENT_FUNCTION: INDEPENDENT
AMBULATION ASSISTANCE: ONE PERSON

## 2025-04-28 ENCOUNTER — HOME CARE VISIT (OUTPATIENT)
Dept: HOME HEALTH SERVICES | Facility: HOME HEALTH | Age: 60
End: 2025-04-28
Payer: COMMERCIAL

## 2025-04-28 VITALS
RESPIRATION RATE: 18 BRPM | HEART RATE: 71 BPM | DIASTOLIC BLOOD PRESSURE: 96 MMHG | OXYGEN SATURATION: 99 % | TEMPERATURE: 97.4 F | SYSTOLIC BLOOD PRESSURE: 138 MMHG

## 2025-04-28 PROCEDURE — G0299 HHS/HOSPICE OF RN EA 15 MIN: HCPCS

## 2025-04-28 ASSESSMENT — ENCOUNTER SYMPTOMS
MUSCLE WEAKNESS: 1
DENIES PAIN: 1
CHANGE IN APPETITE: UNCHANGED
LAST BOWEL MOVEMENT: 67311
APPETITE LEVEL: GOOD
CONSTIPATION: 1

## 2025-04-28 NOTE — HOME HEALTH
[facility] HOMECARE SERVICES [facility]  SDOH RESOURCE NAME:[facility]  RESOURCE CONTACT:      KEYANNA( )  SHARRI( )  UNITCucinialeS( )  PT [employer] BANK( )  DIGITAL/CONNECTION( )  HOUSING(x )  TRANSPORATION( )  SOCIAL CONNECTIONS(x )  STRESS/SUPPORT( )  UTLILITIES( )  DEPRESSION( )  DRUG/ALCOHOL/TOBACCO ( )  FINANCIAL STRAIN( )  FOOD INSECURITY( )  PASSPORT( )  PHONE/ADP PROGRAM( )  HOUSEHOLD/FURNITURE( )  ASSIT.LIVING( )     OTHER( )    FOLLOW UP: YES (x ) NO( ) During pt visit I completed a applicant for [facility] housing for pt. Follow up with wednesday with [facility] for alarm.  ADDTIONAL COMMENT:

## 2025-04-29 ENCOUNTER — HOME CARE VISIT (OUTPATIENT)
Dept: HOME HEALTH SERVICES | Facility: HOME HEALTH | Age: 60
End: 2025-04-29
Payer: COMMERCIAL

## 2025-04-29 PROCEDURE — G0151 HHCP-SERV OF PT,EA 15 MIN: HCPCS

## 2025-04-29 SDOH — HEALTH STABILITY: PHYSICAL HEALTH: EXERCISE TYPE: LEFT KNEE PROTOCOL

## 2025-04-29 SDOH — HEALTH STABILITY: PHYSICAL HEALTH: PHYSICAL EXERCISE: SUPINE, SITTING, STANDING

## 2025-04-29 SDOH — HEALTH STABILITY: PHYSICAL HEALTH: EXERCISE ACTIVITIES SETS: 2

## 2025-04-29 SDOH — HEALTH STABILITY: PHYSICAL HEALTH: EXERCISE ACTIVITY: LEFT KNEE PROTOCOL

## 2025-04-29 SDOH — ECONOMIC STABILITY: HOUSING INSECURITY: OPEN FLAME PRESENT: 1

## 2025-04-29 SDOH — HEALTH STABILITY: PHYSICAL HEALTH
EXERCISE COMMENTS: KNEE PROTOCOL INCLUDES THE FOLLOWING:  SUPINE- ANKLE PUMPS, QUAD SETS, GLUTEAL SETS, KNEE FLEXION, SLR, AND SAQS  SEATED- LAQS, HIP FLEXION, HEEL SLIDES, HIP ABDUCTION  STANDING- CALF RAISES, KNEE FLEXION, MARCHING, HIP ABDUCTION, MINI SQUATS

## 2025-04-29 SDOH — HEALTH STABILITY: PHYSICAL HEALTH: PHYSICAL EXERCISE: 10

## 2025-04-29 ASSESSMENT — ACTIVITIES OF DAILY LIVING (ADL)
AMBULATION ASSISTANCE: STAND BY ASSIST
AMBULATION ASSISTANCE ON FLAT SURFACES: 1
PHYSICAL TRANSFERS ASSESSED: 1
CURRENT_FUNCTION: INDEPENDENT
AMBULATION ASSISTANCE: ONE PERSON
AMBULATION ASSISTANCE: 1

## 2025-04-29 ASSESSMENT — ENCOUNTER SYMPTOMS
LOWEST PAIN SEVERITY IN PAST 24 HOURS: 4/10
PAIN LOCATION - PAIN FREQUENCY: FREQUENT
DEPRESSION: 0
PAIN LOCATION - RELIEVING FACTORS: ICE, MEDS
MUSCLE WEAKNESS: 1
ARTHRALGIAS: 1
LOSS OF SENSATION IN FEET: 0
PAIN LOCATION: LEFT KNEE
LIMITED RANGE OF MOTION: 1
PAIN: DISCUSSED INCREASING USE OF ICE FOR PAIN CONTROL
OCCASIONAL FEELINGS OF UNSTEADINESS: 1
PAIN LOCATION - EXACERBATING FACTORS: PROLONGED STANDING, ROM
PAIN LOCATION - PAIN SEVERITY: 4/10
PERSON REPORTING PAIN: PATIENT
SUBJECTIVE PAIN PROGRESSION: GRADUALLY IMPROVING
PAIN SEVERITY GOAL: 2/10
PAIN: 1
HIGHEST PAIN SEVERITY IN PAST 24 HOURS: 6/10

## 2025-04-30 ENCOUNTER — HOME CARE VISIT (OUTPATIENT)
Dept: HOME HEALTH SERVICES | Facility: HOME HEALTH | Age: 60
End: 2025-04-30
Payer: COMMERCIAL

## 2025-04-30 PROCEDURE — G0152 HHCP-SERV OF OT,EA 15 MIN: HCPCS

## 2025-04-30 ASSESSMENT — ACTIVITIES OF DAILY LIVING (ADL)
FEEDING ASSESSED: 1
TRANSPORTATION ASSESSED: 1
TOILETING: 1
CURRENT_FUNCTION: CONTACT GUARD ASSIST
SHOPPING: NEEDS ASSISTANCE
ORAL_CARE_CURRENT_FUNCTION: INDEPENDENT
LAUNDRY: NEEDS ASSISTANCE
SHOPPING ASSESSED: 1
BATHING ASSESSED: 1
TELEPHONE USE ASSESSED: 1
PREPARING MEALS: NEEDS ASSISTANCE
TRANSPORTATION: DEPENDENT
DRESSING_LB_CURRENT_FUNCTION: CONTACT GUARD ASSIST
BATHING_CURRENT_FUNCTION: STAND BY ASSIST
FEEDING: INDEPENDENT
USING THE TELPHONE: INDEPENDENT
DRESSING_UB_CURRENT_FUNCTION: STAND BY ASSIST
HOUSEKEEPING ASSESSED: 1
PHYSICAL TRANSFERS ASSESSED: 1
GROOMING_CURRENT_FUNCTION: INDEPENDENT
LAUNDRY ASSESSED: 1
ORAL_CARE_ASSESSED: 1
CURRENT_FUNCTION: MINIMUM ASSIST
LIGHT HOUSEKEEPING: NEEDS ASSISTANCE
TOILETING: INDEPENDENT
CURRENT_FUNCTION: STAND BY ASSIST
GROOMING ASSESSED: 1

## 2025-04-30 ASSESSMENT — ENCOUNTER SYMPTOMS
PAIN LOCATION - PAIN SEVERITY: 4/10
PAIN LOCATION: LEFT LEG
SUBJECTIVE PAIN PROGRESSION: WAXING AND WANING
HIGHEST PAIN SEVERITY IN PAST 24 HOURS: 6/10
PAIN: 1
LOWEST PAIN SEVERITY IN PAST 24 HOURS: 3/10
PERSON REPORTING PAIN: PATIENT
PAIN SEVERITY GOAL: 0/10

## 2025-05-02 ENCOUNTER — HOME CARE VISIT (OUTPATIENT)
Dept: HOME HEALTH SERVICES | Facility: HOME HEALTH | Age: 60
End: 2025-05-02
Payer: COMMERCIAL

## 2025-05-05 ENCOUNTER — HOME CARE VISIT (OUTPATIENT)
Dept: HOME HEALTH SERVICES | Facility: HOME HEALTH | Age: 60
End: 2025-05-05
Payer: COMMERCIAL

## 2025-05-05 PROCEDURE — G0151 HHCP-SERV OF PT,EA 15 MIN: HCPCS

## 2025-05-05 SDOH — HEALTH STABILITY: PHYSICAL HEALTH: PHYSICAL EXERCISE: 10

## 2025-05-05 SDOH — HEALTH STABILITY: PHYSICAL HEALTH: EXERCISE TYPE: TKA PROTOCOL

## 2025-05-05 SDOH — HEALTH STABILITY: PHYSICAL HEALTH: EXERCISE ACTIVITIES SETS: 2

## 2025-05-05 SDOH — HEALTH STABILITY: PHYSICAL HEALTH: EXERCISE ACTIVITY: TKA PROTOCOL

## 2025-05-05 SDOH — HEALTH STABILITY: PHYSICAL HEALTH: PHYSICAL EXERCISE: SUPINE, SITTING, STANDING

## 2025-05-05 SDOH — HEALTH STABILITY: PHYSICAL HEALTH
EXERCISE COMMENTS: TKA PROTOCOL INCLUDES THE FOLLOWING:  SUPINE- ANKLE PUMPS, QUAD SETS, GLUTEAL SETS, KNEE FLEXION, SLR, AND SAQS  SEATED- LAQS, HIP FLEXION, HEEL SLIDES  STANDING- CALF RAISES, KNEE FLEXION, MARCHING, MINI SQUATS

## 2025-05-05 ASSESSMENT — ACTIVITIES OF DAILY LIVING (ADL)
GROOMING ASSESSED: 1
SHOPPING: NEEDS ASSISTANCE
TRANSPORTATION: DEPENDENT
BATHING_CURRENT_FUNCTION: STAND BY ASSIST
TOILETING: INDEPENDENT
ORAL_CARE_CURRENT_FUNCTION: INDEPENDENT
AMBULATION ASSISTANCE ON FLAT SURFACES: 1
BATHING ASSESSED: 1
LAUNDRY ASSESSED: 1
CURRENT_FUNCTION: STAND BY ASSIST
DRESSING_UB_CURRENT_FUNCTION: STAND BY ASSIST
GROOMING_CURRENT_FUNCTION: INDEPENDENT
FEEDING ASSESSED: 1
LAUNDRY: NEEDS ASSISTANCE
TRANSPORTATION ASSESSED: 1
DRESSING_LB_CURRENT_FUNCTION: CONTACT GUARD ASSIST
SHOPPING ASSESSED: 1
HOUSEKEEPING ASSESSED: 1
LIGHT HOUSEKEEPING: NEEDS ASSISTANCE
FEEDING: INDEPENDENT
TELEPHONE USE ASSESSED: 1
TOILETING: 1
ORAL_CARE_ASSESSED: 1
USING THE TELPHONE: INDEPENDENT
CURRENT_FUNCTION: MINIMUM ASSIST
PREPARING MEALS: NEEDS ASSISTANCE
PHYSICAL TRANSFERS ASSESSED: 1
CURRENT_FUNCTION: CONTACT GUARD ASSIST

## 2025-05-05 ASSESSMENT — ENCOUNTER SYMPTOMS
LIMITED RANGE OF MOTION: 1
DEPRESSION: 0
PAIN: NO PAIN REPORTED TODAY
MUSCLE WEAKNESS: 1
DENIES PAIN: 1
ARTHRALGIAS: 1
OCCASIONAL FEELINGS OF UNSTEADINESS: 1
LOSS OF SENSATION IN FEET: 0

## 2025-05-05 NOTE — HOME HEALTH
[facility] HOMECARE SERVICES SDOH  SDOH RESOURCE NAME: [facility]   RESOURCE CONTACT:[place]( )  SHARRI( )  UNITEUS( )  [employer] FOOD BANK( )  DIGITAL/CONNECTION( )  HOUSING( )  TRANSPORATION( )  SOCIAL CONNECTIONS( )  STRESS/SUPPORT( )  UTLILITIES( )  DEPRESSION( )  DRUG/ALCOHOL/TOBACCO ( )  FINANCIAL STRAIN( )  FOOD INSECURITY( )  PASSPORT( )  PHONE/ADP PROGRAM( )  HOUSEHOLD/FURNITURE( )  ASSIT.LIVING( )     OTHER(x ) [employer]    FOLLOW UP: YES (x ) NO( ) Followed up with the [employer] fire alarms. Left 2nd message to [employer] will follow up once I received a message.    ADDTIONAL COMMENT:

## 2025-05-07 ENCOUNTER — HOME CARE VISIT (OUTPATIENT)
Dept: HOME HEALTH SERVICES | Facility: HOME HEALTH | Age: 60
End: 2025-05-07
Payer: COMMERCIAL

## 2025-05-07 VITALS
RESPIRATION RATE: 18 BRPM | HEART RATE: 79 BPM | OXYGEN SATURATION: 98 % | TEMPERATURE: 97.6 F | DIASTOLIC BLOOD PRESSURE: 85 MMHG | SYSTOLIC BLOOD PRESSURE: 133 MMHG

## 2025-05-07 PROCEDURE — G0299 HHS/HOSPICE OF RN EA 15 MIN: HCPCS

## 2025-05-07 ASSESSMENT — ENCOUNTER SYMPTOMS
MUSCLE WEAKNESS: 1
CHANGE IN APPETITE: UNCHANGED
APPETITE LEVEL: GOOD
DENIES PAIN: 1

## 2025-05-09 ENCOUNTER — HOME CARE VISIT (OUTPATIENT)
Dept: HOME HEALTH SERVICES | Facility: HOME HEALTH | Age: 60
End: 2025-05-09
Payer: COMMERCIAL

## 2025-05-09 PROCEDURE — G0151 HHCP-SERV OF PT,EA 15 MIN: HCPCS

## 2025-05-09 PROCEDURE — G0152 HHCP-SERV OF OT,EA 15 MIN: HCPCS

## 2025-05-09 SDOH — HEALTH STABILITY: PHYSICAL HEALTH: EXERCISE ACTIVITIES SETS: 2

## 2025-05-09 SDOH — HEALTH STABILITY: PHYSICAL HEALTH
EXERCISE COMMENTS: KNEE PROTOCOL INCLUDES THE FOLLOWING:  SUPINE- ANKLE PUMPS, QUAD SETS, GLUTEAL SETS, KNEE FLEXION, SLR, AND SAQS  SEATED- LAQS, HIP FLEXION, HEEL SLIDES, ROCKING BACK AND FORTH IN WHEELCHAIR, HAMSTRING STRETCH WITH LEFT FOOT PROPPED UP ON CHAIR  STAN

## 2025-05-09 SDOH — HEALTH STABILITY: PHYSICAL HEALTH: EXERCISE ACTIVITY: LEFT KNEE PROTOCOL

## 2025-05-09 SDOH — HEALTH STABILITY: PHYSICAL HEALTH: EXERCISE TYPE: KNEE PROTOCOL LEFT

## 2025-05-09 SDOH — HEALTH STABILITY: PHYSICAL HEALTH: EXERCISE COMMENTS: DING- CALF RAISES, KNEE FLEXION, MARCHING, HIP ABDUCTION

## 2025-05-09 SDOH — ECONOMIC STABILITY: HOUSING INSECURITY: OPEN FLAME PRESENT: 1

## 2025-05-09 SDOH — HEALTH STABILITY: PHYSICAL HEALTH: PHYSICAL EXERCISE: 15

## 2025-05-09 SDOH — HEALTH STABILITY: PHYSICAL HEALTH: PHYSICAL EXERCISE: SUPINE, SITTING, STANDING

## 2025-05-09 ASSESSMENT — ENCOUNTER SYMPTOMS
HIGHEST PAIN SEVERITY IN PAST 24 HOURS: 4/10
PAIN LOCATION - PAIN SEVERITY: 4/10
SUBJECTIVE PAIN PROGRESSION: WAXING AND WANING
LIMITED RANGE OF MOTION: 1
SUBJECTIVE PAIN PROGRESSION: WAXING AND WANING
PAIN LOCATION - PAIN SEVERITY: 3/10
PAIN LOCATION - PAIN FREQUENCY: INTERMITTENT
HIGHEST PAIN SEVERITY IN PAST 24 HOURS: 5/10
PAIN LOCATION - PAIN FREQUENCY: INTERMITTENT
PAIN LOCATION - EXACERBATING FACTORS: PROLONGED STANDING, ROM
PAIN LOCATION: LEFT KNEE
LOSS OF SENSATION IN FEET: 1
PERSON REPORTING PAIN: PATIENT
LOWEST PAIN SEVERITY IN PAST 24 HOURS: 4/10
PAIN SEVERITY GOAL: 0/10
PERSON REPORTING PAIN: PATIENT
ARTHRALGIAS: 1
PAIN SEVERITY GOAL: 2/10
PAIN LOCATION: LEFT KNEE
PAIN LOCATION - PAIN QUALITY: ACHE
LOWEST PAIN SEVERITY IN PAST 24 HOURS: 3/10
PAIN LOCATION - RELIEVING FACTORS: ICE, MEDS
PAIN: 1
MUSCLE WEAKNESS: 1
PAIN: PATIENT DILIGENT WITH PAIN CONTROL TECHNIQUES
OCCASIONAL FEELINGS OF UNSTEADINESS: 1
PAIN: 1
DEPRESSION: 0

## 2025-05-09 ASSESSMENT — ACTIVITIES OF DAILY LIVING (ADL)
AMBULATION ASSISTANCE: STAND BY ASSIST
CURRENT_FUNCTION: INDEPENDENT
PHYSICAL TRANSFERS ASSESSED: 1
AMBULATION ASSISTANCE: ONE PERSON
AMBULATION ASSISTANCE ON FLAT SURFACES: 1
AMBULATION ASSISTANCE: 1

## 2025-05-12 ENCOUNTER — HOME CARE VISIT (OUTPATIENT)
Dept: HOME HEALTH SERVICES | Facility: HOME HEALTH | Age: 60
End: 2025-05-12
Payer: COMMERCIAL

## 2025-05-12 PROCEDURE — G0151 HHCP-SERV OF PT,EA 15 MIN: HCPCS

## 2025-05-12 SDOH — HEALTH STABILITY: PHYSICAL HEALTH: EXERCISE ACTIVITIES SETS: 1

## 2025-05-12 SDOH — HEALTH STABILITY: PHYSICAL HEALTH: EXERCISE ACTIVITIES SETS: 2

## 2025-05-12 SDOH — HEALTH STABILITY: PHYSICAL HEALTH: PHYSICAL EXERCISE: 10

## 2025-05-12 SDOH — HEALTH STABILITY: PHYSICAL HEALTH: EXERCISE ACTIVITY: DYNAMIC STANDING HEP

## 2025-05-12 SDOH — HEALTH STABILITY: PHYSICAL HEALTH: PHYSICAL EXERCISE: STANDING

## 2025-05-12 SDOH — ECONOMIC STABILITY: HOUSING INSECURITY: OPEN FLAME PRESENT: 1

## 2025-05-12 SDOH — HEALTH STABILITY: PHYSICAL HEALTH: EXERCISE TYPE: DYNAMIC BALANCE HEP

## 2025-05-12 SDOH — HEALTH STABILITY: PHYSICAL HEALTH: EXERCISE ACTIVITY: STANDING HEP

## 2025-05-12 SDOH — HEALTH STABILITY: PHYSICAL HEALTH: EXERCISE COMMENTS: ALTERNATING TRUNK ROTATIONS WITH REACHING, SINGLE LEG STANCE BALANCE ACTIVITIES

## 2025-05-12 ASSESSMENT — ACTIVITIES OF DAILY LIVING (ADL)
AMBULATION ASSISTANCE: STAND BY ASSIST
AMBULATION ASSISTANCE: 1
AMBULATION ASSISTANCE: ONE PERSON
CURRENT_FUNCTION: INDEPENDENT
AMBULATION ASSISTANCE ON FLAT SURFACES: 1
PHYSICAL TRANSFERS ASSESSED: 1

## 2025-05-12 ASSESSMENT — ENCOUNTER SYMPTOMS
OCCASIONAL FEELINGS OF UNSTEADINESS: 1
PAIN LOCATION - RELIEVING FACTORS: ICE, MEDS
PAIN LOCATION: LEFT KNEE
PAIN: PATIENT DILIGENT WITH PAIN CONTROL TECHNIQUES
PAIN LOCATION - EXACERBATING FACTORS: PROLONGED STANDING, ROM
MUSCLE WEAKNESS: 1
PAIN: 1
LOSS OF SENSATION IN FEET: 0
ARTHRALGIAS: 1
PAIN SEVERITY GOAL: 2/10
PAIN LOCATION - PAIN SEVERITY: 5/10
PERSON REPORTING PAIN: PATIENT
HIGHEST PAIN SEVERITY IN PAST 24 HOURS: 6/10
PAIN LOCATION - PAIN FREQUENCY: FREQUENT
LIMITED RANGE OF MOTION: 1
LOWEST PAIN SEVERITY IN PAST 24 HOURS: 4/10
DEPRESSION: 0
SUBJECTIVE PAIN PROGRESSION: WAXING AND WANING

## 2025-05-13 ENCOUNTER — HOME CARE VISIT (OUTPATIENT)
Dept: HOME HEALTH SERVICES | Facility: HOME HEALTH | Age: 60
End: 2025-05-13
Payer: COMMERCIAL

## 2025-05-13 VITALS
HEART RATE: 97 BPM | DIASTOLIC BLOOD PRESSURE: 76 MMHG | TEMPERATURE: 97.9 F | SYSTOLIC BLOOD PRESSURE: 118 MMHG | RESPIRATION RATE: 18 BRPM | OXYGEN SATURATION: 95 %

## 2025-05-13 PROCEDURE — G0299 HHS/HOSPICE OF RN EA 15 MIN: HCPCS

## 2025-05-13 ASSESSMENT — ENCOUNTER SYMPTOMS
PERSON REPORTING PAIN: PATIENT
APPETITE LEVEL: FAIR
DENIES PAIN: 1
LAST BOWEL MOVEMENT: 67337

## 2025-05-14 ENCOUNTER — HOME CARE VISIT (OUTPATIENT)
Dept: HOME HEALTH SERVICES | Facility: HOME HEALTH | Age: 60
End: 2025-05-14
Payer: COMMERCIAL

## 2025-05-14 PROCEDURE — G0152 HHCP-SERV OF OT,EA 15 MIN: HCPCS

## 2025-05-15 ENCOUNTER — APPOINTMENT (OUTPATIENT)
Dept: CARDIOLOGY | Facility: CLINIC | Age: 60
End: 2025-05-15
Payer: COMMERCIAL

## 2025-05-15 ENCOUNTER — TELEMEDICINE (OUTPATIENT)
Dept: PHARMACY | Facility: HOSPITAL | Age: 60
End: 2025-05-15
Payer: COMMERCIAL

## 2025-05-15 VITALS
HEART RATE: 73 BPM | BODY MASS INDEX: 41.15 KG/M2 | DIASTOLIC BLOOD PRESSURE: 69 MMHG | OXYGEN SATURATION: 92 % | WEIGHT: 225 LBS | SYSTOLIC BLOOD PRESSURE: 148 MMHG

## 2025-05-15 DIAGNOSIS — I13.0 HYPERTENSIVE HEART AND KIDNEY DISEASE WITH HF AND WITH CKD STAGE IV: ICD-10-CM

## 2025-05-15 DIAGNOSIS — I50.33 ACUTE ON CHRONIC DIASTOLIC (CONGESTIVE) HEART FAILURE: Primary | ICD-10-CM

## 2025-05-15 DIAGNOSIS — Z79.4 TYPE 2 DIABETES MELLITUS WITH RIGHT EYE AFFECTED BY PROLIFERATIVE RETINOPATHY AND MACULAR EDEMA, WITH LONG-TERM CURRENT USE OF INSULIN: ICD-10-CM

## 2025-05-15 DIAGNOSIS — I50.31 ACUTE DIASTOLIC HEART FAILURE: ICD-10-CM

## 2025-05-15 DIAGNOSIS — N18.4 HYPERTENSIVE HEART AND KIDNEY DISEASE WITH HF AND WITH CKD STAGE IV: ICD-10-CM

## 2025-05-15 DIAGNOSIS — E11.3511 TYPE 2 DIABETES MELLITUS WITH RIGHT EYE AFFECTED BY PROLIFERATIVE RETINOPATHY AND MACULAR EDEMA, WITH LONG-TERM CURRENT USE OF INSULIN: ICD-10-CM

## 2025-05-15 PROCEDURE — RXMED WILLOW AMBULATORY MEDICATION CHARGE

## 2025-05-15 PROCEDURE — 3060F POS MICROALBUMINURIA REV: CPT | Performed by: INTERNAL MEDICINE

## 2025-05-15 PROCEDURE — 99214 OFFICE O/P EST MOD 30 MIN: CPT | Performed by: INTERNAL MEDICINE

## 2025-05-15 PROCEDURE — 3078F DIAST BP <80 MM HG: CPT | Performed by: INTERNAL MEDICINE

## 2025-05-15 PROCEDURE — 3052F HG A1C>EQUAL 8.0%<EQUAL 9.0%: CPT | Performed by: INTERNAL MEDICINE

## 2025-05-15 PROCEDURE — 1036F TOBACCO NON-USER: CPT | Performed by: INTERNAL MEDICINE

## 2025-05-15 PROCEDURE — 3077F SYST BP >= 140 MM HG: CPT | Performed by: INTERNAL MEDICINE

## 2025-05-15 RX ORDER — HYDRALAZINE HYDROCHLORIDE 50 MG/1
50 TABLET, FILM COATED ORAL 3 TIMES DAILY
Qty: 270 TABLET | Refills: 3 | Status: SHIPPED | OUTPATIENT
Start: 2025-05-15

## 2025-05-15 RX ORDER — TORSEMIDE 20 MG/1
40 TABLET ORAL DAILY
Qty: 120 TABLET | Refills: 0 | Status: SHIPPED | OUTPATIENT
Start: 2025-05-15 | End: 2025-07-16

## 2025-05-15 RX ORDER — TIRZEPATIDE 5 MG/.5ML
5 INJECTION, SOLUTION SUBCUTANEOUS WEEKLY
Qty: 2 ML | Refills: 1 | Status: ON HOLD | OUTPATIENT
Start: 2025-05-15

## 2025-05-15 NOTE — PROGRESS NOTES
Clinical Pharmacy Appointment    Patient ID: Nuris Squires is a 59 y.o. female who presents for No chief complaint on file..    Pt is here for Follow Up appointment.  Referring Provider: Mone Aquino MD  PCP: Mone Aquino MD, last visit: 2/20/25, next visit: 5/20/25    Subjective   HPI  PMH significant for ***.  Special needs/barriers to therapy: {Needs/Barriers:66254}    Medication Reconciliation:  {Med Rec:64065}    Drug Interactions  {Drug Interactions:28266}    Medication System Management  Adherence/Organization: {Concerns?:02036}  Affordability/Accessibility: {Concerns?:03048}  Patient's preferred pharmacy:     [facility] - [facility] AT [facility] to [facility]  [place] [address]  Phone: [phone] Fax: [phone]    [id number] [place], [place] - [address]  [address] [facility] [address]  Phone: [phone] Fax: [phone]    [facility]  [address], [address]  [facility] [address]  Phone: [phone] Fax: [phone]       DIABETES MELLITUS Type 2:    Follows with Endocrinology?: Not currently    Pertinent PMH Review  Known diabetic complications:   {Diabetes Complications:82522}  Hx or FH Hx of MTC/MEN2?: {Yes/No:71729}  Pancreatitis?: {Yes/No:03474}  Gastroparesis?: {Yes/No:45336}  UTI/Yeast Infections?: {Yes/No:91612}    Pharmacological Therapy  Current Medications:   Mounjaro 2.5mg once weekly  Insuline glargine (Basaglar) 15 units once daily ***  Insulin lispro (Admelog) SS TID before meals (1-3 times per day) ***  Blood Glucose Admelog Units   < 80 0   81 - 150 10   151 - 200 12   201 - 250 14   252 - 300 16   301 - 350 18   351 - 400 20   > 400 22 + call provider   Previous Medications: Metformin, Jardiance (VENKAT)     Clarifications to above regimen: None  Adverse Effects: None    Glucose Monitoring  Glucometer/CGM Type:  Freestyle Remington 3    Previous home BG readings: (2/14/25) 30-day Avg   Current home BG readings: ***  Review History --> Average Glucose (last option) --> Use arrows on bottom to change # of days  Review History --> Scroll to next page --> Time in [employer] (2nd option) Use arrows on bottom to change # of days  Average Glucose   7-day 14-day 30-day 90-day   Average *** *** *** ***   Overnight (12am - 6am) *** *** *** ***   Morning (6am - 12pm) *** *** *** ***   Afternoon (12pm - 6pm) *** *** *** ***   Evening (6pm-12am) *** *** *** ***     Time in [employer]   7-day 14-day 30-day 90-day   Above (>180) ***% ***% ***% ***%   In [employer] () ***% ***% ***% ***%   Below (<70) ***% ***% ***% ***%     Any episodes of hypoglycemia? {YES wildcard/NO:60}   Did patient treat episode of hypoglycemia appropriately? {Yes/No/NA:95266}  Does the patient have a prescription for ready-to-use Glucagon? No. Has glucose tablets.    Lifestyle  Diet: 1 meals/day. ***  Physical Activity: ***    Risk-Reducing Medications  Statin? Yes  ACE-I/ARB? N/A    Preventative Care  Diabetic Eye Exam: Up to date, completed 11/2024  Monofilament Foot Exam: Following with podiatry  uACR in past year? Yes  Immunizations: RSV, Prevnar, Shingrix, and Tdap  Tobacco Use: non-smoker      Objective   Allergies[1]  Social History     Social History Narrative    Not on file      Medication Review  Current Outpatient Medications   Medication Instructions    atorvastatin (LIPITOR) 40 mg, oral, Daily    B complex-vitamin C-folic acid (Nephro-Duke) 0.8 mg tablet 1 tablet, oral, Daily    Basaglar KwikPen U-100 Insulin 15 Units, subcutaneous, Every morning, Take as directed per insulin instructions.    blood-glucose sensor (FreeStyle Remington 3 Sensor) device Use to monitor blood glucose. Change sensor every 14 days.    carvedilol (COREG) 12.5 mg, oral, 2 times daily    ergocalciferol (VITAMIN D-2) 1,250 mcg, oral, Once Weekly    ferrous sulfate 325 mg, oral, Daily  "with breakfast    FreeStyle Remington 3 [place] misc Use as instructed to monitor blood glucose.    gabapentin (NEURONTIN) 300 mg, oral, 2 times daily    hydrALAZINE (APRESOLINE) 50 mg, oral, 3 times daily    insulin lispro (Admelog SoloStar U-100 Insulin) 100 unit/mL injection Inject per sliding scale instructions under the skin 3 times a day with meals. (Max daily dose 70 units)    isosorbide mononitrate ER (IMDUR) 30 mg, oral, Daily, Do not crush or chew.    Mounjaro 2.5 mg, subcutaneous, Every 7 days    OneTouch Delica Plus Lancet 30 gauge misc USE TO TEST BLOOD SUGAR AS DIRECTED    OneTouch Ultra Test strip Use 1 strip BID to check glucose    OneTouch Ultra2 Meter misc use 1 to 2 times daily    oxyCODONE (ROXICODONE) 5 mg, oral, Every 4 hours PRN    pen needle, diabetic 32 gauge x 5/32\" needle Use four times a day with insulin use    torsemide (DEMADEX) 40 mg, oral, Daily      Vitals  BP Readings from Last 2 Encounters:   05/15/25 148/69   05/13/25 118/76     Wt Readings from Last 3 Encounters:   05/15/25 102 kg (225 lb)   04/23/25 100 kg (221 lb)   03/10/25 103 kg (227 lb 1.2 oz)      There is no height or weight on file to calculate BMI.  Labs  A1C  Lab Results   Component Value Date    HGBA1C 8.7 (H) 01/02/2025    HGBA1C 9 (A) 11/19/2024    HGBA1C 11.2 (A) 07/19/2024     Metabolic Panel  Lab Results   Component Value Date    EGFR 19 (L) 03/29/2025    CREATININE 2.77 (H) 03/29/2025     03/29/2025    K 5.3 03/29/2025    CALCIUM 8.6 03/29/2025     (H) 03/29/2025    CO2 23 03/29/2025    BUN 47 (H) 03/29/2025     Liver function  Lab Results   Component Value Date    ALT 14 02/05/2025    AST 13 02/05/2025    ALKPHOS 68 02/05/2025    BILITOT 0.3 02/05/2025     Lipid Panel  Lab Results   Component Value Date    CHOL 174 05/08/2023    TRIG 189 (H) 05/08/2023    HDL 38.5 (A) 05/08/2023     Urine Microalbumin  Lab Results   Component Value Date    MICROALBCREA  11/19/2024      Comment:      One or more analytes " used in this calculation is outside of the analytical measurement range. Calculation cannot be performed.       Assessment/Plan   Problem List Items Addressed This Visit    None      Patient's A1c is 8.7% on 1/2/25. Goal A1c <7%.  Patient's SMBGs are ***.     Rationale for plan: ***.    Medication Changes:  {Medication Changes Plan:71639}     Patient Education:  {Patient Education:27381}    Clinical Pharmacist follow-up: ***, {In-Person / Telehealth:78329} visit  Patient is not followed in [facility].    Thank you,  Amanda Joe, [facility]  Clinical Pharmacy Specialist  [phone]    Continue all meds under the continuation of care with the referring provider and clinical pharmacy team.  Verbal consent to manage patient's drug therapy was obtained from the patient. They were informed they may decline to participate or withdraw from participation in pharmacy services at any time.         [1] No Known Allergies

## 2025-05-15 NOTE — PATIENT INSTRUCTIONS
To reach Dr. Sparks's office please call [phone] ([facility]). Fax [phone]. Call [phone] to schedule an appointment. You may also contact the HF RNs at [email or web address]    Thank you for coming to your appointment today. If you have any questions or need cardiac medication refills, please call the Heart Failure Office at [phone] option [phone].   You may also contact the HF RNs at [email or web address]>      Please INCREASE Hydralazine to 50mg THREE times a day   Please schedule an appointment with Dr. Sparks in 1 year

## 2025-05-15 NOTE — PROGRESS NOTES
Heart Failure Cardiology    Nuris Squires is a 59 y.o. 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  At her last appointment in February I advised her to increase the dose of hydralazine to 50 mg 3 times a day, but the dose was never increased.  She is still quite hypertensive currently.    Studies:    Stress 10/2019  1. Normal myocardial perfusion study without evidence of ischemia or prior infarction.  2. The left ventricle is normal in size.  3. Normal LV wall motion with an LV EF estimated at greater than 65%.    [facility] Jan 2025 1/8/2025: RA 14, PA 58/14, PA mean 34, wedge 20, F CI 5.1  1/17/2025: /65, MAP 86, RA 8, PA 52/14, PA mean 29, wedge 14, Yung CI 9    Exam: /69   Pulse 73   Wt 102 kg (225 lb)   SpO2 92%   BMI 41.15 kg/m²   Pleasant alert oriented  No JVD  Regular rate and rhythm  No murmurs  Obese  No ascites  Trace leg edema    Current Outpatient Medications   Medication Instructions    atorvastatin (LIPITOR) 40 mg, oral, Daily    B complex-vitamin C-folic acid (Nephro-Duke) 0.8 mg tablet 1 tablet, oral, Daily    Basaglar KwikPen U-100 Insulin 15 Units, subcutaneous, Every morning, Take as directed per insulin instructions.    blood-glucose sensor (FreeStyle Remington 3 Sensor) device Use to monitor blood glucose. Change sensor every 14 days.    carvedilol (COREG) 12.5 mg, oral, 2 times daily    ergocalciferol (VITAMIN D-2) 1,250 mcg, oral, Once Weekly    ferrous sulfate 325 mg, oral, Daily with breakfast    FreeStyle Remington 3 [place] misc Use as instructed to monitor blood glucose.    gabapentin (NEURONTIN) 300 mg, oral, 2 times daily    hydrALAZINE (Apresoline) 25 mg tablet 1 tablet, 3 times daily    insulin lispro (Admelog SoloStar U-100 Insulin) 100 unit/mL injection Inject per sliding scale instructions under  "the skin 3 times a day with meals. (Max daily dose 70 units)    isosorbide mononitrate ER (IMDUR) 30 mg, oral, Daily, Do not crush or chew.    Mounjaro 2.5 mg, subcutaneous, Every 7 days    OneTouch Delica Plus Lancet 30 gauge misc USE TO TEST BLOOD SUGAR AS DIRECTED    OneTouch Ultra Test strip Use 1 strip BID to check glucose    OneTouch Ultra2 Meter misc use 1 to 2 times daily    oxyCODONE (ROXICODONE) 5 mg, oral, Every 4 hours PRN    pen needle, diabetic 32 gauge x 5/32\" needle Use four times a day with insulin use    torsemide (DEMADEX) 40 mg, oral, Daily PRN     Past medical history (non-cardiac):  -- DM2 insulin-dependent c/neuropathy and nephropathy  -- CKD G5 (eGFR 12); A3  -- Hypertension, poorly controlled  -- Prior amputation of toe  -- PAD  -- HL  -- Obesity (BMI 43.6)  -- Anemia    Cardiovascular history:  -- HFpEF, Stage C, NYHA class II-III (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  She had a hospitalization for acutely decompensated heart failure early in 2025.    Plan:  -- Increase dose of hydralazine to 50mg TID for better hypertension control  -- CKD G5/A3 at high risk for progression to ESRD: to see nephrologist next week      Marylin Sparks MD, MPH  Advanced Heart Failure and Transplant Cardiology ([facility]FTC)  [facility]  [place], [place]  "

## 2025-05-16 ENCOUNTER — PHARMACY VISIT (OUTPATIENT)
Dept: PHARMACY | Facility: CLINIC | Age: 60
End: 2025-05-16
Payer: MEDICARE

## 2025-05-16 ENCOUNTER — HOME CARE VISIT (OUTPATIENT)
Dept: HOME HEALTH SERVICES | Facility: HOME HEALTH | Age: 60
End: 2025-05-16
Payer: COMMERCIAL

## 2025-05-16 ENCOUNTER — DOCUMENTATION (OUTPATIENT)
Dept: PRIMARY CARE | Facility: CLINIC | Age: 60
End: 2025-05-16
Payer: COMMERCIAL

## 2025-05-16 NOTE — PROGRESS NOTES
Patient has met target of no readmission for 30 days post hospital discharge and is graduated from Transitional Care Management program at this time.

## 2025-05-17 ENCOUNTER — PHARMACY VISIT (OUTPATIENT)
Dept: PHARMACY | Facility: CLINIC | Age: 60
End: 2025-05-17

## 2025-05-19 ENCOUNTER — APPOINTMENT (OUTPATIENT)
Dept: NEPHROLOGY | Facility: CLINIC | Age: 60
End: 2025-05-19
Payer: COMMERCIAL

## 2025-05-19 NOTE — ASSESSMENT & PLAN NOTE
Patient's A1c is 8.7% on 1/2/25. Goal A1c <7%.  Patient's SMBGs are not available for review, patient reports BG ranging 130's-240's.     Rationale for plan: Patient tolerating current dose of Mounjaro well, no notable adverse effects. Plan to continue titrating as tolerated for weight and glycemic benefits. Continue current dose of Lantus and Admelog sliding scale for glucose control. Follow-up in 2 months per patient preference.    Medication Changes:  INCREASE  Mounjaro 5mg once weekly  CONTINUE  Insuline glargine (Basaglar) 15 units once daily  Insulin lispro (Admelog) SS TID before meals

## 2025-05-20 ENCOUNTER — APPOINTMENT (OUTPATIENT)
Dept: PRIMARY CARE | Facility: CLINIC | Age: 60
End: 2025-05-20
Payer: COMMERCIAL

## 2025-05-20 ENCOUNTER — TELEPHONE (OUTPATIENT)
Dept: WOUND CARE | Facility: HOSPITAL | Age: 60
End: 2025-05-20

## 2025-05-20 VITALS
HEART RATE: 75 BPM | TEMPERATURE: 97.1 F | WEIGHT: 226.2 LBS | DIASTOLIC BLOOD PRESSURE: 78 MMHG | SYSTOLIC BLOOD PRESSURE: 134 MMHG | OXYGEN SATURATION: 93 % | HEIGHT: 62 IN | BODY MASS INDEX: 41.62 KG/M2

## 2025-05-20 DIAGNOSIS — Z00.00 ROUTINE GENERAL MEDICAL EXAMINATION AT HEALTH CARE FACILITY: Primary | ICD-10-CM

## 2025-05-20 DIAGNOSIS — T14.8XXA INFECTED WOUND: ICD-10-CM

## 2025-05-20 DIAGNOSIS — L97.522 CHRONIC ULCER OF LEFT FOOT WITH FAT LAYER EXPOSED (MULTI): ICD-10-CM

## 2025-05-20 DIAGNOSIS — E78.00 HIGH CHOLESTEROL: ICD-10-CM

## 2025-05-20 DIAGNOSIS — E11.3511 TYPE 2 DIABETES MELLITUS WITH RIGHT EYE AFFECTED BY PROLIFERATIVE RETINOPATHY AND MACULAR EDEMA, WITH LONG-TERM CURRENT USE OF INSULIN: ICD-10-CM

## 2025-05-20 DIAGNOSIS — L97.502: ICD-10-CM

## 2025-05-20 DIAGNOSIS — I10 ESSENTIAL HYPERTENSION: ICD-10-CM

## 2025-05-20 DIAGNOSIS — I10 HYPERTENSION, UNSPECIFIED TYPE: ICD-10-CM

## 2025-05-20 DIAGNOSIS — Z79.4 TYPE 2 DIABETES MELLITUS WITH RIGHT EYE AFFECTED BY PROLIFERATIVE RETINOPATHY AND MACULAR EDEMA, WITH LONG-TERM CURRENT USE OF INSULIN: ICD-10-CM

## 2025-05-20 DIAGNOSIS — L08.9 INFECTED WOUND: ICD-10-CM

## 2025-05-20 LAB — POC HEMOGLOBIN A1C: 7.3 % (ref 4.2–6.5)

## 2025-05-20 PROCEDURE — G0439 PPPS, SUBSEQ VISIT: HCPCS | Performed by: FAMILY MEDICINE

## 2025-05-20 PROCEDURE — 99396 PREV VISIT EST AGE 40-64: CPT | Performed by: FAMILY MEDICINE

## 2025-05-20 PROCEDURE — 1036F TOBACCO NON-USER: CPT | Performed by: FAMILY MEDICINE

## 2025-05-20 PROCEDURE — 3051F HG A1C>EQUAL 7.0%<8.0%: CPT | Performed by: FAMILY MEDICINE

## 2025-05-20 PROCEDURE — 3060F POS MICROALBUMINURIA REV: CPT | Performed by: FAMILY MEDICINE

## 2025-05-20 PROCEDURE — 83036 HEMOGLOBIN GLYCOSYLATED A1C: CPT | Performed by: FAMILY MEDICINE

## 2025-05-20 PROCEDURE — 3075F SYST BP GE 130 - 139MM HG: CPT | Performed by: FAMILY MEDICINE

## 2025-05-20 PROCEDURE — 3008F BODY MASS INDEX DOCD: CPT | Performed by: FAMILY MEDICINE

## 2025-05-20 PROCEDURE — 99213 OFFICE O/P EST LOW 20 MIN: CPT | Performed by: FAMILY MEDICINE

## 2025-05-20 PROCEDURE — 3078F DIAST BP <80 MM HG: CPT | Performed by: FAMILY MEDICINE

## 2025-05-20 RX ORDER — AMOXICILLIN AND CLAVULANATE POTASSIUM 875; 125 MG/1; MG/1
875 TABLET, FILM COATED ORAL 2 TIMES DAILY
Qty: 20 TABLET | Refills: 0 | Status: ON HOLD | OUTPATIENT
Start: 2025-05-20 | End: 2025-05-30

## 2025-05-20 RX ORDER — ISOSORBIDE MONONITRATE 30 MG/1
30 TABLET, EXTENDED RELEASE ORAL DAILY
Qty: 30 TABLET | Refills: 3 | Status: ON HOLD | OUTPATIENT
Start: 2025-05-20 | End: 2025-09-17

## 2025-05-20 ASSESSMENT — ACTIVITIES OF DAILY LIVING (ADL)
DOING_HOUSEWORK: INDEPENDENT
DRESSING: INDEPENDENT
MANAGING_FINANCES: INDEPENDENT
TAKING_MEDICATION: INDEPENDENT
GROCERY_SHOPPING: INDEPENDENT
BATHING: INDEPENDENT

## 2025-05-20 ASSESSMENT — ENCOUNTER SYMPTOMS
DEPRESSION: 0
OCCASIONAL FEELINGS OF UNSTEADINESS: 0
LOSS OF SENSATION IN FEET: 0

## 2025-05-20 ASSESSMENT — PAIN SCALES - GENERAL: PAINLEVEL_OUTOF10: 0-NO PAIN

## 2025-05-20 ASSESSMENT — PATIENT HEALTH QUESTIONNAIRE - PHQ9
SUM OF ALL RESPONSES TO PHQ9 QUESTIONS 1 AND 2: 0
2. FEELING DOWN, DEPRESSED OR HOPELESS: NOT AT ALL
1. LITTLE INTEREST OR PLEASURE IN DOING THINGS: NOT AT ALL

## 2025-05-20 NOTE — PATIENT INSTRUCTIONS
Immunizations recommended:  --Flu shot every fall.  --Shingrix is the shingles vaccine, and can be done after the age of 50.  --Tetanus every 10 years.  --Covid vaccine.  --Prevnar 20 to reduce risk of pneumonia  (Declined)    If your pap smears have been normal, you can do them every 3-5 years, and stop after the age of 65.  Done with gynecologist    Colonoscopy should be done every 10 years after the age of 45, unless there was a previous abnormality, or family history of colon cancer.  -yours was done in 2019 and is due after 7 years (2026)    Mammogram screening recommendations are changing, but at this time, I recommend a mammogram every 1-2 years in your 40's, and yearly after the age of 50.  Having a family history of breast cancer may change that.  Due in November.    You should try to get 150 minutes of exercise weekly.  Be sure to eat a diet rich in fruits and vegetables, and low in saturated fats and sodium.  Strive for a healthy BMI of less than 25.     Make sure to wear sun screen when outside, and check for changing or unusual moles.    Pre-menopause recommendations are for 1000 mg calcium and 1000 units vitamin D3 daily.  After menopause calcium recommendation is 1200 mg, with 1000 units of vitamin D3    I recommend you get a Living Will and Durable Power of  for Healthcare. These documents state what care you would want if you couldn't speak for yourself. They help your loved ones in caring for you if that happens.   Once you have those forms completed, you can keep a copy on file with your doctor.    Specialists being seen:  Cardiologist  Nephrologist.    For foot wound, refer to the wound clinic.  Augment sent to pharmacy for suspected infection.    Diabetes with improving control since medication adjusted.  Last A1C 7.3 today, down from  8.7 on 1/2/25.    Currently taking Basaglar to 15 units and SSI 1-2 times a day.  Started on Mounjaro 2.5 mg.  Will be going up to 5 mg.  Working with   pharmacy for medications.  Using CGM, which has been helpful.    For BP and heart failure, taking carvedilol 12.5 mg BID, hydralazine 25 mg TID, isosorbide ER 30 mg,   Will be seeing cardiologist May 15.    Taking atorvastatin 40 mg for cholesterol.    For chronic kidney disease, seeing nephrologist.

## 2025-05-20 NOTE — TELEPHONE ENCOUNTER
Referral in for wound clinic.   Number for [facility] given for an appt asap.     Numbers given to [facility] and [facility] [place]

## 2025-05-20 NOTE — PROGRESS NOTES
"Subjective   Reason for Visit: Nuris Squires is an 59 y.o. female here for a Medicare Wellness visit.     Past Medical, Surgical, and Family History reviewed and updated in chart.    Reviewed all medications by prescribing practitioner or clinical pharmacist (such as prescriptions, OTCs, herbal therapies and supplements) and documented in the medical record.    Left 5th toetail came off a week ago.  Sensation is OK, not hurting  She does have an ulcer on bottom of foot that  is draining.  Family member keeps it wrapped.  Has been to wound clinic in the past, not for a while.    Still a little tired with walking.  A little SOB when walks, no CP.  Saw cardiologist recently,  will follow in a year.    Hips are feeling OK.    Is working with  pharmacy for diabetes.   A1C 7.3 today.  Will be increasing Mounjaro to 5 mg.    Blood work ordered by nephrology.   She has to reschedule her appointment with them, missed it yesterday.            Patient Care Team:  Mone Aquino MD as PCP - General (Family Medicine)  Mone Aquino MD as PCP - [facility] [employer] PCP  Amanda Joe [facility] as Pharmacist (Family Medicine)  Ania Wang as [facility]ists (Diabetes Care [facility] Education [facility]ists)  She Brown MD as Consulting Physician (Nephrology)     Review of Systems    Objective   Vitals:  /78 (BP Location: Right arm, Patient Position: Sitting, BP Cuff Size: Large adult)   Pulse 75   Temp 36.2 °C (97.1 °F) (Temporal)   Ht 1.575 m (5' 2\")   Wt 103 kg (226 lb 3.2 oz)   SpO2 93%   BMI 41.37 kg/m²       Physical Exam  Vitals reviewed.   Constitutional:       Appearance: Normal appearance.   Neck:      Thyroid: No thyromegaly.   Cardiovascular:      Rate and Rhythm: Normal rate and regular rhythm.      Heart sounds: No murmur heard.  Pulmonary:      Effort: Pulmonary effort is normal.      Breath sounds: Normal breath sounds.   Musculoskeletal:      Cervical back: " Normal range of motion and neck supple.      Right lower leg: No edema.      Left lower leg: No edema.      Comments: Left toenail missing but toe has normal color, not tender.    Ball of foot wrapped, but looking under gauze, it has very foul smell and deep ulcer, about 2 x 3 cm.  Gray material in it.   Lymphadenopathy:      Cervical: No cervical adenopathy.   Neurological:      Mental Status: She is alert.   Psychiatric:         Mood and Affect: Mood normal.         Behavior: Behavior normal.         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, with long-term current use of insulin    Orders:    POCT glycosylated hemoglobin (Hb A1C) manually resulted    Routine general medical examination at health care facility    Orders:    1 Year Follow Up In [facility] - PCP - Wellness Exam; Future    Body mass index 40.0-44.9, adult (Multi)    Orders:    Lipid Panel; Future    Essential hypertension         Chronic ulcer of left foot with fat layer exposed (Multi)         Non-pressure chronic ulcer of other part of unspecified foot with fat layer exposed (Multi)    Orders:    Referral to Wound Clinic; Future    Hypertension, unspecified type    Orders:    isosorbide mononitrate ER (Imdur) 30 mg 24 hr tablet; Take 1 tablet (30 mg) by mouth once daily. Do not crush or chew.    Infected wound    Orders:    Referral to Wound Clinic; Future    amoxicillin-clavulanate (Augmentin) 875-125 mg tablet; Take 1 tablet (875 mg) by mouth 2 times a day for 10 days.    High cholesterol    Orders:    Lipid Panel; Future

## 2025-05-21 ENCOUNTER — HOME CARE VISIT (OUTPATIENT)
Dept: HOME HEALTH SERVICES | Facility: HOME HEALTH | Age: 60
End: 2025-05-21
Payer: COMMERCIAL

## 2025-05-21 PROCEDURE — G0151 HHCP-SERV OF PT,EA 15 MIN: HCPCS

## 2025-05-21 SDOH — HEALTH STABILITY: PHYSICAL HEALTH
EXERCISE COMMENTS: BALANCE TRAINING PROVIDED THIS DATE TO INCLUDE PERTURBATIONS W/ NARROW STANCE AND BACKWARDS WALKING AT COUNTER. PT DEMONSTRATES MODERATE LOB DURING PERTURBATIONS REQUIRING UE SUPPORT TO CATCH SELF. PT REPORTS BACKWARDS WALKING TO BE DIFFICULT FOR HER

## 2025-05-21 SDOH — HEALTH STABILITY: PHYSICAL HEALTH
EXERCISE COMMENTS: . THEREX INSTRUCTED TO INCLUDE SIDE MARCHING, MARCHING, HAMSTRING CURLS. REST BREAKS PROVIDED THROUGHOUT.

## 2025-05-21 SDOH — ECONOMIC STABILITY: HOUSING INSECURITY: OPEN FLAME PRESENT: 1

## 2025-05-21 SDOH — HEALTH STABILITY: PHYSICAL HEALTH: EXERCISE ACTIVITY: STANDING EXERCISES

## 2025-05-21 SDOH — HEALTH STABILITY: PHYSICAL HEALTH: PHYSICAL EXERCISE: 10

## 2025-05-21 SDOH — HEALTH STABILITY: PHYSICAL HEALTH: EXERCISE ACTIVITIES SETS: 1

## 2025-05-21 SDOH — HEALTH STABILITY: PHYSICAL HEALTH: EXERCISE TYPE: BALANCE TRAINING

## 2025-05-21 ASSESSMENT — ENCOUNTER SYMPTOMS
DEPRESSION: 0
LIMITED RANGE OF MOTION: 1
PAIN: NO PAIN REPORTED TODAY
DENIES PAIN: 1
MUSCLE WEAKNESS: 1
OCCASIONAL FEELINGS OF UNSTEADINESS: 1
LOSS OF SENSATION IN FEET: 0
PERSON REPORTING PAIN: PATIENT

## 2025-05-21 ASSESSMENT — ACTIVITIES OF DAILY LIVING (ADL)
PHYSICAL TRANSFERS ASSESSED: 1
CURRENT_FUNCTION: SUPERVISION
AMBULATION ASSISTANCE: STAND BY ASSIST
AMBULATION_DISTANCE/DURATION_TOLERATED: 40 FT
AMBULATION ASSISTANCE: 1
CURRENT_FUNCTION: STAND BY ASSIST
AMBULATION ASSISTANCE ON FLAT SURFACES: 1
PHYSICAL_TRANSFERS_DEVICES: ROLLATOR

## 2025-05-23 ENCOUNTER — OFFICE VISIT (OUTPATIENT)
Dept: WOUND CARE | Facility: CLINIC | Age: 60
End: 2025-05-23
Payer: COMMERCIAL

## 2025-05-23 ENCOUNTER — HOME CARE VISIT (OUTPATIENT)
Dept: HOME HEALTH SERVICES | Facility: HOME HEALTH | Age: 60
End: 2025-05-23
Payer: COMMERCIAL

## 2025-05-23 ENCOUNTER — APPOINTMENT (OUTPATIENT)
Dept: PHARMACY | Facility: HOSPITAL | Age: 60
End: 2025-05-23
Payer: COMMERCIAL

## 2025-05-23 ENCOUNTER — HOSPITAL ENCOUNTER (OUTPATIENT)
Dept: RADIOLOGY | Facility: HOSPITAL | Age: 60
Discharge: HOME | End: 2025-05-23
Payer: COMMERCIAL

## 2025-05-23 DIAGNOSIS — L03.119 CELLULITIS OF FOOT: ICD-10-CM

## 2025-05-23 DIAGNOSIS — L97.523: ICD-10-CM

## 2025-05-23 PROCEDURE — 99213 OFFICE O/P EST LOW 20 MIN: CPT | Mod: 25

## 2025-05-23 PROCEDURE — 73630 X-RAY EXAM OF FOOT: CPT | Mod: LT

## 2025-05-23 NOTE — PROGRESS NOTES
Wound Care Follow Up     Visit Date: 5/23/2025      Patient Name: Nuris Squires         MRN: [id number]              Pertinent Labs:       Assessment:                         Wound Plan: ***      Ele Zelaya DPM  5/23/2025  6:01 PM

## 2025-05-24 ENCOUNTER — CLINICAL SUPPORT (OUTPATIENT)
Dept: EMERGENCY MEDICINE | Facility: HOSPITAL | Age: 60
End: 2025-05-24
Payer: COMMERCIAL

## 2025-05-24 ENCOUNTER — HOSPITAL ENCOUNTER (INPATIENT)
Facility: HOSPITAL | Age: 60
End: 2025-05-24
Attending: EMERGENCY MEDICINE | Admitting: INTERNAL MEDICINE
Payer: COMMERCIAL

## 2025-05-24 ENCOUNTER — APPOINTMENT (OUTPATIENT)
Dept: RADIOLOGY | Facility: HOSPITAL | Age: 60
End: 2025-05-24
Payer: COMMERCIAL

## 2025-05-24 ENCOUNTER — TELEPHONE (OUTPATIENT)
Dept: WOUND CARE | Facility: HOSPITAL | Age: 60
End: 2025-05-24
Payer: COMMERCIAL

## 2025-05-24 DIAGNOSIS — S72.422K CLOSED BICONDYLAR FRACTURE OF DISTAL FEMUR, LEFT, WITH NONUNION, SUBSEQUENT ENCOUNTER: ICD-10-CM

## 2025-05-24 DIAGNOSIS — L97.522 CHRONIC ULCER OF LEFT FOOT WITH FAT LAYER EXPOSED (MULTI): ICD-10-CM

## 2025-05-24 DIAGNOSIS — E09.52: ICD-10-CM

## 2025-05-24 DIAGNOSIS — K59.1 FUNCTIONAL DIARRHEA: ICD-10-CM

## 2025-05-24 DIAGNOSIS — L08.9 LEFT FOOT INFECTION: Primary | ICD-10-CM

## 2025-05-24 DIAGNOSIS — E11.3511 TYPE 2 DIABETES MELLITUS WITH RIGHT EYE AFFECTED BY PROLIFERATIVE RETINOPATHY AND MACULAR EDEMA, WITH LONG-TERM CURRENT USE OF INSULIN: ICD-10-CM

## 2025-05-24 DIAGNOSIS — S72.432K CLOSED BICONDYLAR FRACTURE OF DISTAL FEMUR, LEFT, WITH NONUNION, SUBSEQUENT ENCOUNTER: ICD-10-CM

## 2025-05-24 DIAGNOSIS — N17.0 ACUTE RENAL FAILURE WITH ACUTE TUBULAR NECROSIS SUPERIMPOSED ON STAGE 3A CHRONIC KIDNEY DISEASE (MULTI): ICD-10-CM

## 2025-05-24 DIAGNOSIS — Z79.4 DIABETES MELLITUS DUE TO UNDERLYING CONDITION WITH DIABETIC PERIPHERAL ANGIOPATHY AND GANGRENE, WITH LONG-TERM CURRENT USE OF INSULIN: ICD-10-CM

## 2025-05-24 DIAGNOSIS — N18.31 ACUTE RENAL FAILURE WITH ACUTE TUBULAR NECROSIS SUPERIMPOSED ON STAGE 3A CHRONIC KIDNEY DISEASE (MULTI): ICD-10-CM

## 2025-05-24 DIAGNOSIS — Z79.4 TYPE 2 DIABETES MELLITUS WITH RIGHT EYE AFFECTED BY PROLIFERATIVE RETINOPATHY AND MACULAR EDEMA, WITH LONG-TERM CURRENT USE OF INSULIN: ICD-10-CM

## 2025-05-24 DIAGNOSIS — R33.9 URINARY RETENTION: ICD-10-CM

## 2025-05-24 DIAGNOSIS — R33.8 ACUTE URINARY RETENTION: ICD-10-CM

## 2025-05-24 DIAGNOSIS — L97.502: ICD-10-CM

## 2025-05-24 DIAGNOSIS — K21.9 GASTROESOPHAGEAL REFLUX DISEASE WITHOUT ESOPHAGITIS: ICD-10-CM

## 2025-05-24 DIAGNOSIS — E08.52 DIABETES MELLITUS DUE TO UNDERLYING CONDITION WITH DIABETIC PERIPHERAL ANGIOPATHY AND GANGRENE, WITH LONG-TERM CURRENT USE OF INSULIN: ICD-10-CM

## 2025-05-24 DIAGNOSIS — D64.9 ANEMIA, UNSPECIFIED TYPE: ICD-10-CM

## 2025-05-24 DIAGNOSIS — L98.9 DISORDER OF SKIN: ICD-10-CM

## 2025-05-24 LAB
ABO GROUP (TYPE) IN BLOOD: NORMAL
ALBUMIN SERPL BCP-MCNC: 3.1 G/DL (ref 3.4–5)
ALP SERPL-CCNC: 70 U/L (ref 33–136)
ALT SERPL W P-5'-P-CCNC: 9 U/L (ref 7–45)
ANION GAP SERPL CALC-SCNC: 13 MMOL/L (ref 10–20)
ANTIBODY SCREEN: NORMAL
APTT PPP: 31 SECONDS (ref 26–36)
AST SERPL W P-5'-P-CCNC: 13 U/L (ref 9–39)
ATRIAL RATE: 67 BPM
BASOPHILS # BLD AUTO: 0.04 X10*3/UL (ref 0–0.1)
BASOPHILS NFR BLD AUTO: 0.7 %
BILIRUB SERPL-MCNC: 0.4 MG/DL (ref 0–1.2)
BUN SERPL-MCNC: 29 MG/DL (ref 6–23)
CALCIUM SERPL-MCNC: 8.5 MG/DL (ref 8.6–10.6)
CHLORIDE SERPL-SCNC: 104 MMOL/L (ref 98–107)
CO2 SERPL-SCNC: 23 MMOL/L (ref 21–32)
CREAT SERPL-MCNC: 2.46 MG/DL (ref 0.5–1.05)
CRP SERPL-MCNC: 2.21 MG/DL
EGFRCR SERPLBLD CKD-EPI 2021: 22 ML/MIN/1.73M*2
EOSINOPHIL # BLD AUTO: 0.09 X10*3/UL (ref 0–0.7)
EOSINOPHIL NFR BLD AUTO: 1.5 %
ERYTHROCYTE [DISTWIDTH] IN BLOOD BY AUTOMATED COUNT: 13.3 % (ref 11.5–14.5)
ERYTHROCYTE [SEDIMENTATION RATE] IN BLOOD BY WESTERGREN METHOD: 42 MM/H (ref 0–30)
GLUCOSE BLD MANUAL STRIP-MCNC: 202 MG/DL (ref 74–99)
GLUCOSE SERPL-MCNC: 265 MG/DL (ref 74–99)
HCT VFR BLD AUTO: 25.6 % (ref 36–46)
HGB BLD-MCNC: 8.1 G/DL (ref 12–16)
IMM GRANULOCYTES # BLD AUTO: 0.02 X10*3/UL (ref 0–0.7)
IMM GRANULOCYTES NFR BLD AUTO: 0.3 % (ref 0–0.9)
INR PPP: 1.2 (ref 0.9–1.1)
LYMPHOCYTES # BLD AUTO: 1.73 X10*3/UL (ref 1.2–4.8)
LYMPHOCYTES NFR BLD AUTO: 28.9 %
MCH RBC QN AUTO: 28.9 PG (ref 26–34)
MCHC RBC AUTO-ENTMCNC: 31.6 G/DL (ref 32–36)
MCV RBC AUTO: 91 FL (ref 80–100)
MONOCYTES # BLD AUTO: 0.49 X10*3/UL (ref 0.1–1)
MONOCYTES NFR BLD AUTO: 8.2 %
NEUTROPHILS # BLD AUTO: 3.62 X10*3/UL (ref 1.2–7.7)
NEUTROPHILS NFR BLD AUTO: 60.4 %
NRBC BLD-RTO: 0 /100 WBCS (ref 0–0)
P AXIS: 57 DEGREES
P OFFSET: 213 MS
P ONSET: 154 MS
PLATELET # BLD AUTO: 364 X10*3/UL (ref 150–450)
POTASSIUM SERPL-SCNC: 4.4 MMOL/L (ref 3.5–5.3)
PR INTERVAL: 134 MS
PROT SERPL-MCNC: 6.9 G/DL (ref 6.4–8.2)
PROTHROMBIN TIME: 13.3 SECONDS (ref 9.8–12.4)
Q ONSET: 221 MS
QRS COUNT: 12 BEATS
QRS DURATION: 84 MS
QT INTERVAL: 422 MS
QTC CALCULATION(BAZETT): 445 MS
QTC FREDERICIA: 438 MS
R AXIS: 133 DEGREES
RBC # BLD AUTO: 2.8 X10*6/UL (ref 4–5.2)
RH FACTOR (ANTIGEN D): NORMAL
SODIUM SERPL-SCNC: 136 MMOL/L (ref 136–145)
T AXIS: 46 DEGREES
T OFFSET: 432 MS
VENTRICULAR RATE: 67 BPM
WBC # BLD AUTO: 6 X10*3/UL (ref 4.4–11.3)

## 2025-05-24 PROCEDURE — 2550000001 HC RX 255 CONTRASTS: Mod: JZ | Performed by: INTERNAL MEDICINE

## 2025-05-24 PROCEDURE — 73720 MRI LWR EXTREMITY W/O&W/DYE: CPT | Mod: LEFT SIDE | Performed by: STUDENT IN AN ORGANIZED HEALTH CARE EDUCATION/TRAINING PROGRAM

## 2025-05-24 PROCEDURE — 2550000001 HC RX 255 CONTRASTS: Performed by: EMERGENCY MEDICINE

## 2025-05-24 PROCEDURE — 2500000004 HC RX 250 GENERAL PHARMACY W/ HCPCS (ALT 636 FOR OP/ED): Mod: JZ

## 2025-05-24 PROCEDURE — 85025 COMPLETE CBC W/AUTO DIFF WBC: CPT | Performed by: EMERGENCY MEDICINE

## 2025-05-24 PROCEDURE — 80053 COMPREHEN METABOLIC PANEL: CPT | Performed by: EMERGENCY MEDICINE

## 2025-05-24 PROCEDURE — 73720 MRI LWR EXTREMITY W/O&W/DYE: CPT | Mod: LT

## 2025-05-24 PROCEDURE — 99285 EMERGENCY DEPT VISIT HI MDM: CPT

## 2025-05-24 PROCEDURE — 99285 EMERGENCY DEPT VISIT HI MDM: CPT | Mod: 25 | Performed by: EMERGENCY MEDICINE

## 2025-05-24 PROCEDURE — 1100000001 HC PRIVATE ROOM DAILY

## 2025-05-24 PROCEDURE — 2500000001 HC RX 250 WO HCPCS SELF ADMINISTERED DRUGS (ALT 637 FOR MEDICARE OP)

## 2025-05-24 PROCEDURE — 85610 PROTHROMBIN TIME: CPT

## 2025-05-24 PROCEDURE — 86140 C-REACTIVE PROTEIN: CPT

## 2025-05-24 PROCEDURE — 86901 BLOOD TYPING SEROLOGIC RH(D): CPT

## 2025-05-24 PROCEDURE — 71046 X-RAY EXAM CHEST 2 VIEWS: CPT

## 2025-05-24 PROCEDURE — 86923 COMPATIBILITY TEST ELECTRIC: CPT

## 2025-05-24 PROCEDURE — 82947 ASSAY GLUCOSE BLOOD QUANT: CPT

## 2025-05-24 PROCEDURE — 87040 BLOOD CULTURE FOR BACTERIA: CPT | Performed by: EMERGENCY MEDICINE

## 2025-05-24 PROCEDURE — 73701 CT LOWER EXTREMITY W/DYE: CPT | Mod: LT

## 2025-05-24 PROCEDURE — 71046 X-RAY EXAM CHEST 2 VIEWS: CPT | Performed by: STUDENT IN AN ORGANIZED HEALTH CARE EDUCATION/TRAINING PROGRAM

## 2025-05-24 PROCEDURE — 73701 CT LOWER EXTREMITY W/DYE: CPT | Mod: LEFT SIDE | Performed by: STUDENT IN AN ORGANIZED HEALTH CARE EDUCATION/TRAINING PROGRAM

## 2025-05-24 PROCEDURE — 85652 RBC SED RATE AUTOMATED: CPT

## 2025-05-24 PROCEDURE — 73701 CT LOWER EXTREMITY W/DYE: CPT | Mod: LT,RSC

## 2025-05-24 PROCEDURE — 36415 COLL VENOUS BLD VENIPUNCTURE: CPT | Performed by: EMERGENCY MEDICINE

## 2025-05-24 PROCEDURE — A9575 INJ GADOTERATE MEGLUMI 0.1ML: HCPCS | Mod: JZ | Performed by: INTERNAL MEDICINE

## 2025-05-24 PROCEDURE — 93005 ELECTROCARDIOGRAM TRACING: CPT

## 2025-05-24 PROCEDURE — 36415 COLL VENOUS BLD VENIPUNCTURE: CPT

## 2025-05-24 RX ORDER — CLINDAMYCIN PHOSPHATE 600 MG/50ML
600 INJECTION, SOLUTION INTRAVENOUS EVERY 8 HOURS
Status: DISCONTINUED | OUTPATIENT
Start: 2025-05-24 | End: 2025-05-26

## 2025-05-24 RX ORDER — OXYCODONE HYDROCHLORIDE 5 MG/1
5 TABLET ORAL EVERY 4 HOURS PRN
Status: DISCONTINUED | OUTPATIENT
Start: 2025-05-24 | End: 2025-05-29

## 2025-05-24 RX ORDER — ENOXAPARIN SODIUM 100 MG/ML
30 INJECTION SUBCUTANEOUS EVERY 24 HOURS
Status: DISCONTINUED | OUTPATIENT
Start: 2025-05-24 | End: 2025-05-29

## 2025-05-24 RX ORDER — GADOTERATE MEGLUMINE 376.9 MG/ML
20 INJECTION INTRAVENOUS
Status: COMPLETED | OUTPATIENT
Start: 2025-05-24 | End: 2025-05-24

## 2025-05-24 RX ORDER — DEXTROSE 50 % IN WATER (D50W) INTRAVENOUS SYRINGE
25
Status: DISCONTINUED | OUTPATIENT
Start: 2025-05-24 | End: 2025-06-05 | Stop reason: HOSPADM

## 2025-05-24 RX ORDER — INSULIN LISPRO 100 [IU]/ML
0-5 INJECTION, SOLUTION INTRAVENOUS; SUBCUTANEOUS
Status: DISCONTINUED | OUTPATIENT
Start: 2025-05-25 | End: 2025-05-28

## 2025-05-24 RX ORDER — VANCOMYCIN HYDROCHLORIDE 1 G/20ML
INJECTION, POWDER, LYOPHILIZED, FOR SOLUTION INTRAVENOUS DAILY PRN
Status: DISCONTINUED | OUTPATIENT
Start: 2025-05-24 | End: 2025-05-30

## 2025-05-24 RX ORDER — GABAPENTIN 300 MG/1
300 CAPSULE ORAL 2 TIMES DAILY
Status: DISCONTINUED | OUTPATIENT
Start: 2025-05-24 | End: 2025-06-05 | Stop reason: HOSPADM

## 2025-05-24 RX ORDER — ATORVASTATIN CALCIUM 40 MG/1
40 TABLET, FILM COATED ORAL NIGHTLY
Status: DISCONTINUED | OUTPATIENT
Start: 2025-05-24 | End: 2025-06-05 | Stop reason: HOSPADM

## 2025-05-24 RX ORDER — HYDRALAZINE HYDROCHLORIDE 50 MG/1
50 TABLET, FILM COATED ORAL 3 TIMES DAILY
Status: DISCONTINUED | OUTPATIENT
Start: 2025-05-24 | End: 2025-06-05 | Stop reason: HOSPADM

## 2025-05-24 RX ORDER — POLYETHYLENE GLYCOL 3350 17 G/17G
17 POWDER, FOR SOLUTION ORAL DAILY
Status: DISCONTINUED | OUTPATIENT
Start: 2025-05-24 | End: 2025-06-05 | Stop reason: HOSPADM

## 2025-05-24 RX ORDER — ISOSORBIDE MONONITRATE 30 MG/1
30 TABLET, EXTENDED RELEASE ORAL DAILY
Status: DISCONTINUED | OUTPATIENT
Start: 2025-05-24 | End: 2025-06-05 | Stop reason: HOSPADM

## 2025-05-24 RX ORDER — TORSEMIDE 20 MG/1
40 TABLET ORAL DAILY
Status: DISCONTINUED | OUTPATIENT
Start: 2025-05-24 | End: 2025-06-05 | Stop reason: HOSPADM

## 2025-05-24 RX ORDER — VANCOMYCIN HYDROCHLORIDE 1 G/200ML
1000 INJECTION, SOLUTION INTRAVENOUS EVERY 24 HOURS
Status: DISCONTINUED | OUTPATIENT
Start: 2025-05-24 | End: 2025-05-27

## 2025-05-24 RX ORDER — DEXTROSE 50 % IN WATER (D50W) INTRAVENOUS SYRINGE
12.5
Status: DISCONTINUED | OUTPATIENT
Start: 2025-05-24 | End: 2025-06-05 | Stop reason: HOSPADM

## 2025-05-24 RX ORDER — CLINDAMYCIN PHOSPHATE 600 MG/50ML
600 INJECTION, SOLUTION INTRAVENOUS EVERY 8 HOURS
Status: DISCONTINUED | OUTPATIENT
Start: 2025-05-24 | End: 2025-05-24

## 2025-05-24 RX ORDER — CARVEDILOL 12.5 MG/1
12.5 TABLET ORAL 2 TIMES DAILY
Status: DISCONTINUED | OUTPATIENT
Start: 2025-05-24 | End: 2025-06-05 | Stop reason: HOSPADM

## 2025-05-24 RX ADMIN — GABAPENTIN 300 MG: 300 CAPSULE ORAL at 22:16

## 2025-05-24 RX ADMIN — VANCOMYCIN HYDROCHLORIDE 1000 MG: 1 INJECTION, SOLUTION INTRAVENOUS at 18:01

## 2025-05-24 RX ADMIN — GADOTERATE MEGLUMINE 20 ML: 376.9 INJECTION INTRAVENOUS at 21:17

## 2025-05-24 RX ADMIN — CARVEDILOL 12.5 MG: 12.5 TABLET, FILM COATED ORAL at 22:17

## 2025-05-24 RX ADMIN — PIPERACILLIN SODIUM AND TAZOBACTAM SODIUM 3.38 G: 3; .375 INJECTION, SOLUTION INTRAVENOUS at 17:32

## 2025-05-24 RX ADMIN — CLINDAMYCIN IN 5 PERCENT DEXTROSE 600 MG: 12 INJECTION, SOLUTION INTRAVENOUS at 20:10

## 2025-05-24 RX ADMIN — IOHEXOL 70 ML: 350 INJECTION, SOLUTION INTRAVENOUS at 19:05

## 2025-05-24 RX ADMIN — ATORVASTATIN CALCIUM 40 MG: 40 TABLET, FILM COATED ORAL at 22:17

## 2025-05-24 SDOH — SOCIAL STABILITY: SOCIAL INSECURITY
WITHIN THE LAST YEAR, HAVE YOU BEEN KICKED, HIT, SLAPPED, OR OTHERWISE PHYSICALLY HURT BY YOUR PARTNER OR EX-PARTNER?: PATIENT DECLINED

## 2025-05-24 SDOH — ECONOMIC STABILITY: HOUSING INSECURITY: IN THE PAST 12 MONTHS, HOW MANY TIMES HAVE YOU MOVED WHERE YOU WERE LIVING?: 1

## 2025-05-24 SDOH — HEALTH STABILITY: PHYSICAL HEALTH
HOW OFTEN DO YOU NEED TO HAVE SOMEONE HELP YOU WHEN YOU READ INSTRUCTIONS, PAMPHLETS, OR OTHER WRITTEN MATERIAL FROM YOUR DOCTOR OR PHARMACY?: NEVER

## 2025-05-24 SDOH — SOCIAL STABILITY: SOCIAL INSECURITY: ARE YOU OR HAVE YOU BEEN THREATENED OR ABUSED PHYSICALLY, EMOTIONALLY, OR SEXUALLY BY ANYONE?: NO

## 2025-05-24 SDOH — SOCIAL STABILITY: SOCIAL INSECURITY
WITHIN THE LAST YEAR, HAVE YOU BEEN HUMILIATED OR EMOTIONALLY ABUSED IN OTHER WAYS BY YOUR PARTNER OR EX-PARTNER?: PATIENT DECLINED

## 2025-05-24 SDOH — ECONOMIC STABILITY: HOUSING INSECURITY: IN THE LAST 12 MONTHS, WAS THERE A TIME WHEN YOU WERE NOT ABLE TO PAY THE MORTGAGE OR RENT ON TIME?: PATIENT DECLINED

## 2025-05-24 SDOH — SOCIAL STABILITY: SOCIAL INSECURITY: DOES ANYONE TRY TO KEEP YOU FROM HAVING/CONTACTING OTHER FRIENDS OR DOING THINGS OUTSIDE YOUR HOME?: NO

## 2025-05-24 SDOH — ECONOMIC STABILITY: TRANSPORTATION INSECURITY
IN THE PAST 12 MONTHS, HAS LACK OF TRANSPORTATION KEPT YOU FROM MEDICAL APPOINTMENTS OR FROM GETTING MEDICATIONS?: PATIENT DECLINED

## 2025-05-24 SDOH — ECONOMIC STABILITY: HOUSING INSECURITY: AT ANY TIME IN THE PAST 12 MONTHS, WERE YOU HOMELESS OR LIVING IN A SHELTER (INCLUDING NOW)?: PATIENT DECLINED

## 2025-05-24 SDOH — ECONOMIC STABILITY: FOOD INSECURITY
WITHIN THE PAST 12 MONTHS, YOU WORRIED THAT YOUR FOOD WOULD RUN OUT BEFORE YOU GOT THE MONEY TO BUY MORE.: PATIENT DECLINED

## 2025-05-24 SDOH — ECONOMIC STABILITY: INCOME INSECURITY
IN THE PAST 12 MONTHS HAS THE ELECTRIC, GAS, OIL, OR WATER COMPANY THREATENED TO SHUT OFF SERVICES IN YOUR HOME?: PATIENT DECLINED

## 2025-05-24 SDOH — SOCIAL STABILITY: SOCIAL INSECURITY: WERE YOU ABLE TO COMPLETE ALL THE BEHAVIORAL HEALTH SCREENINGS?: YES

## 2025-05-24 SDOH — SOCIAL STABILITY: SOCIAL NETWORK: HOW OFTEN DO YOU GET TOGETHER WITH FRIENDS OR RELATIVES?: PATIENT DECLINED

## 2025-05-24 SDOH — SOCIAL STABILITY: SOCIAL NETWORK
DO YOU BELONG TO ANY CLUBS OR ORGANIZATIONS SUCH AS CHURCH GROUPS, UNIONS, FRATERNAL OR ATHLETIC GROUPS, OR SCHOOL GROUPS?: PATIENT DECLINED

## 2025-05-24 SDOH — SOCIAL STABILITY: SOCIAL INSECURITY: WITHIN THE LAST YEAR, HAVE YOU BEEN AFRAID OF YOUR PARTNER OR EX-PARTNER?: PATIENT DECLINED

## 2025-05-24 SDOH — HEALTH STABILITY: MENTAL HEALTH
DO YOU FEEL STRESS - TENSE, RESTLESS, NERVOUS, OR ANXIOUS, OR UNABLE TO SLEEP AT NIGHT BECAUSE YOUR MIND IS TROUBLED ALL THE TIME - THESE DAYS?: PATIENT DECLINED

## 2025-05-24 SDOH — SOCIAL STABILITY: SOCIAL INSECURITY: HAVE YOU HAD THOUGHTS OF HARMING ANYONE ELSE?: NO

## 2025-05-24 SDOH — SOCIAL STABILITY: SOCIAL NETWORK: HOW OFTEN DO YOU ATTEND CHURCH OR RELIGIOUS SERVICES?: PATIENT DECLINED

## 2025-05-24 SDOH — SOCIAL STABILITY: SOCIAL NETWORK: IN A TYPICAL WEEK, HOW MANY TIMES DO YOU TALK ON THE PHONE WITH FAMILY, FRIENDS, OR NEIGHBORS?: PATIENT DECLINED

## 2025-05-24 SDOH — ECONOMIC STABILITY: FOOD INSECURITY: HOW HARD IS IT FOR YOU TO PAY FOR THE VERY BASICS LIKE FOOD, HOUSING, MEDICAL CARE, AND HEATING?: PATIENT DECLINED

## 2025-05-24 SDOH — SOCIAL STABILITY: SOCIAL INSECURITY: DO YOU FEEL ANYONE HAS EXPLOITED OR TAKEN ADVANTAGE OF YOU FINANCIALLY OR OF YOUR PERSONAL PROPERTY?: NO

## 2025-05-24 SDOH — ECONOMIC STABILITY: FOOD INSECURITY: WITHIN THE PAST 12 MONTHS, THE FOOD YOU BOUGHT JUST DIDN'T LAST AND YOU DIDN'T HAVE MONEY TO GET MORE.: PATIENT DECLINED

## 2025-05-24 SDOH — SOCIAL STABILITY: SOCIAL INSECURITY: HAVE YOU HAD ANY THOUGHTS OF HARMING ANYONE ELSE?: NO

## 2025-05-24 SDOH — HEALTH STABILITY: PHYSICAL HEALTH: ON AVERAGE, HOW MANY MINUTES DO YOU ENGAGE IN EXERCISE AT THIS LEVEL?: 0 MIN

## 2025-05-24 SDOH — HEALTH STABILITY: PHYSICAL HEALTH: ON AVERAGE, HOW MANY DAYS PER WEEK DO YOU ENGAGE IN MODERATE TO STRENUOUS EXERCISE (LIKE A BRISK WALK)?: 0 DAYS

## 2025-05-24 SDOH — SOCIAL STABILITY: SOCIAL NETWORK: HOW OFTEN DO YOU ATTEND MEETINGS OF THE CLUBS OR ORGANIZATIONS YOU BELONG TO?: PATIENT DECLINED

## 2025-05-24 SDOH — SOCIAL STABILITY: SOCIAL INSECURITY: ARE THERE ANY APPARENT SIGNS OF INJURIES/BEHAVIORS THAT COULD BE RELATED TO ABUSE/NEGLECT?: NO

## 2025-05-24 SDOH — SOCIAL STABILITY: SOCIAL INSECURITY: HAS ANYONE EVER THREATENED TO HURT YOUR FAMILY OR YOUR PETS?: NO

## 2025-05-24 SDOH — SOCIAL STABILITY: SOCIAL INSECURITY: ABUSE: ADULT

## 2025-05-24 SDOH — SOCIAL STABILITY: SOCIAL INSECURITY: ARE YOU MARRIED, WIDOWED, DIVORCED, SEPARATED, NEVER MARRIED, OR LIVING WITH A PARTNER?: PATIENT DECLINED

## 2025-05-24 SDOH — SOCIAL STABILITY: SOCIAL INSECURITY
WITHIN THE LAST YEAR, HAVE YOU BEEN RAPED OR FORCED TO HAVE ANY KIND OF SEXUAL ACTIVITY BY YOUR PARTNER OR EX-PARTNER?: PATIENT DECLINED

## 2025-05-24 SDOH — SOCIAL STABILITY: SOCIAL INSECURITY: DO YOU FEEL UNSAFE GOING BACK TO THE PLACE WHERE YOU ARE LIVING?: NO

## 2025-05-24 ASSESSMENT — LIFESTYLE VARIABLES
SUBSTANCE_ABUSE_PAST_12_MONTHS: NO
TOTAL SCORE: 0
PRESCIPTION_ABUSE_PAST_12_MONTHS: NO
AUDIT-C TOTAL SCORE: 1
HOW OFTEN DO YOU HAVE 6 OR MORE DRINKS ON ONE OCCASION: LESS THAN MONTHLY
AUDIT-C TOTAL SCORE: 1
HOW MANY STANDARD DRINKS CONTAINING ALCOHOL DO YOU HAVE ON A TYPICAL DAY: 1 OR 2
HAVE YOU EVER FELT YOU SHOULD CUT DOWN ON YOUR DRINKING: NO
EVER FELT BAD OR GUILTY ABOUT YOUR DRINKING: NO
EVER HAD A DRINK FIRST THING IN THE MORNING TO STEADY YOUR NERVES TO GET RID OF A HANGOVER: NO
HAVE PEOPLE ANNOYED YOU BY CRITICIZING YOUR DRINKING: NO
HOW OFTEN DO YOU HAVE A DRINK CONTAINING ALCOHOL: NEVER
SKIP TO QUESTIONS 9-10: 0

## 2025-05-24 ASSESSMENT — ACTIVITIES OF DAILY LIVING (ADL)
PATIENT'S MEMORY ADEQUATE TO SAFELY COMPLETE DAILY ACTIVITIES?: YES
HEARING - RIGHT EAR: FUNCTIONAL
TOILETING: INDEPENDENT
GROOMING: INDEPENDENT
WALKS IN HOME: INDEPENDENT
FEEDING YOURSELF: INDEPENDENT
ADEQUATE_TO_COMPLETE_ADL: YES
LACK_OF_TRANSPORTATION: PATIENT DECLINED
ASSISTIVE_DEVICE: EYEGLASSES;WALKER;WHEELCHAIR
HEARING - LEFT EAR: FUNCTIONAL
JUDGMENT_ADEQUATE_SAFELY_COMPLETE_DAILY_ACTIVITIES: YES
BATHING: INDEPENDENT
DRESSING YOURSELF: INDEPENDENT

## 2025-05-24 ASSESSMENT — COGNITIVE AND FUNCTIONAL STATUS - GENERAL
PATIENT BASELINE BEDBOUND: NO
CLIMB 3 TO 5 STEPS WITH RAILING: A LOT
DAILY ACTIVITIY SCORE: 24
MOBILITY SCORE: 20
WALKING IN HOSPITAL ROOM: A LITTLE
MOVING TO AND FROM BED TO CHAIR: A LITTLE

## 2025-05-24 ASSESSMENT — PAIN SCALES - GENERAL
PAINLEVEL_OUTOF10: 0 - NO PAIN
PAINLEVEL_OUTOF10: 0 - NO PAIN

## 2025-05-24 ASSESSMENT — PATIENT HEALTH QUESTIONNAIRE - PHQ9
2. FEELING DOWN, DEPRESSED OR HOPELESS: NOT AT ALL
SUM OF ALL RESPONSES TO PHQ9 QUESTIONS 1 & 2: 0
1. LITTLE INTEREST OR PLEASURE IN DOING THINGS: NOT AT ALL

## 2025-05-24 ASSESSMENT — COLUMBIA-SUICIDE SEVERITY RATING SCALE - C-SSRS
6. HAVE YOU EVER DONE ANYTHING, STARTED TO DO ANYTHING, OR PREPARED TO DO ANYTHING TO END YOUR LIFE?: NO
2. HAVE YOU ACTUALLY HAD ANY THOUGHTS OF KILLING YOURSELF?: NO
1. IN THE PAST MONTH, HAVE YOU WISHED YOU WERE DEAD OR WISHED YOU COULD GO TO SLEEP AND NOT WAKE UP?: NO

## 2025-05-24 ASSESSMENT — PAIN - FUNCTIONAL ASSESSMENT: PAIN_FUNCTIONAL_ASSESSMENT: 0-10

## 2025-05-24 NOTE — ED TRIAGE NOTES
Pt had doctors appointment yesterday and x-ray done of left foot, was told by pcp to come into ER to get an MRI for possible infection to bone.

## 2025-05-24 NOTE — CONSULTS
PODIATRY CONSULT NOTE    SERVICE DATE: 5/24/2025   SERVICE TIME:  6:54 PM      Consults   PRIMARY CARE PHYSICIAN: Mone Aquino MD    Subjective   HPI: Ms. Squires is a 59 y.o. female  who presents for Left foot infection, is on day 0 of admission. 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 Garcia DPM for osteomyelitis of L foot. Patient followed up with her podiatrist in 5/23 and had xrays concerning for osteomyelitis and possible gas. Patient endorses 5/10 sharp shooting pain to her left lower extremity. Patient did not know she had a wound on the bottom of her foot until seeing her podiatrist and thus does not know how much drainage she has had. Patient denies being on PO abx. Discussed with patient emergent need for a Left TMA. Patient agreeable. Discussed postoperative course with the patient.  Patient denies any constitutional symptoms. No other pedal complaints.     Podiatry was Consulted for: Left foot wound/ Infection    Medical History[1]  Surgical History[2]  Family History[3]  Social History[4]   Prescriptions Prior to Admission[5]   Allergies[6]     Medications:  Scheduled Medications[7]  Continuous Medications[8]  PRN Medications[9]    Allergies as of 05/24/2025    (No Known Allergies)            Objective   PHYSICAL EXAM:  Vitals:    05/24/25 1830   BP: (!) 188/90   Pulse: 80   Resp: 17   Temp:    SpO2:      Body mass index is 41.15 kg/m².    Patient is AOx3 and in no acute distress. Patient is alert and cooperative. Sitting comfortably in bed with dressing clean, dry and intact.     Vascular: Palpable DP/PT pulses B/L. Moderate pitting edema noted B/L. Hair growth absent B/L. CFT<5 to B/L hallux. Temperature is warm to cool from tibial tuberosity to distal digits B/L. No lymphatic streaking noted B/L.    Musculoskeletal: Gross active and passive ROM intact to age and activity level.  Moves all extremities spontaneously. No pain to palpation at feet B/L.     Neurological: Intact light touch sensation B/L. Pain stimuli diminished B/L. Denies any numbness, burning or tingling.    Dermatologic: Nails 3,4,5 L and 1-5 R are within normal limits for thickness and length B/L. Skin appears diffusely xerotic B/L.    Wound: Left Plantar foot - Lateral  Measurements:  3.2cm x 2.6cm x probes to bone  Mixed wound base of fibrous tissue.   Significant serosanguinous drainage with fibrotic slough.   Moderate selene-wound maceration.   Significant selene-wound erythema.   Moderate evidence of ascending cellulitis or lymphangitis.  Moderate palpable fluctuance. Moderate malodor. Moderate increased warmth.   Significant probe to bone or deep structures within the wound base.   Significant tunneling or tracking.   Moderate undermining. Skin edges irregular but intact.    LABS:   Lab Results   Component Value Date    HGBA1C 7.3 (A) 05/20/2025      Lab Results   Component Value Date    CRP 2.21 (H) 05/24/2025      Lab Results   Component Value Date    SEDRATE 36 (H) 02/05/2025        Results from last 7 days   Lab Units 05/24/25  1327   WBC AUTO x10*3/uL 6.0   RBC AUTO x10*6/uL 2.80*   HEMOGLOBIN g/dL 8.1*   HEMATOCRIT % 25.6*     Results from last 7 days   Lab Units 05/24/25  1327   GLUCOSE mg/dL 265*   SODIUM mmol/L 136   POTASSIUM mmol/L 4.4   CHLORIDE mmol/L 104   CO2 mmol/L 23   BUN mg/dL 29*   CREATININE mg/dL 2.46*   CALCIUM mg/dL 8.5*   BILIRUBIN TOTAL mg/dL 0.4   ALT U/L 9   AST U/L 13       Cultures  No results found for the last 90 days.      IMAGING REVIEW:  XR foot left 3+ views 05/23/2025    Narrative  Interpreted By:  Raymundo Early,  STUDY:  XR FOOT LEFT 3+ VIEWS    INDICATION:  Signs/Symptoms:foot infection.    COMPARISON:  October 9, 2020    ACCESSION NUMBER(S):  [id number]    ORDERING CLINICIAN:  ANURAG BRUNNER    FINDINGS:  Postsurgical changes of 1st and 2nd digit amputation.    New bony erosive  changes at the 4th proximal phalanx and metatarsal  head highly concerning for osteomyelitis.    Marked soft tissue swelling with possible soft tissue gas which could  be gas producing soft tissue infection.    Impression  New bony erosive changes at the 4th proximal phalanx and metatarsal  head highly concerning for osteomyelitis.    Marked soft tissue swelling with possible soft tissue gas which could  be gas producing soft tissue infection.    MACRO:  Critical Finding:  See findings. Notification was initiated on  5/24/2025 at 8:25 am by  Raymundo Early.  (**-OCF-**)    Signed by: Raymundo Early 5/24/2025 8:25 AM  Dictation workstation:   [id number]       Vascular   No results found for this or any previous visit from the past 365 days.            Assessment/Plan   ASSESSMENT & PLAN:  Shaw is a 59 y.o. female  who presents for Left foot infection, is on day 0 of admission.    #Osteomyelitis of L foot  #Diabetic foot infection, recurrent  #T2DM    - Patient was seen and evaluated; all findings were discussed and all questions were answered to patient's satisfaction.  - Charts, labs, vitals and imaging all reviewed.   - Imaging:   Xray left foot:   New bony erosive changes at the 4th proximal phalanx and metatarsal  head highly concerning for osteomyelitis.  Marked soft tissue swelling with possible soft tissue gas which could  be gas producing soft tissue infection.  CT Scan Ordered to evaluate proximal limb infection Left, Pending  MRI Ordered for pre-op planning, pending  Plan:  - Antibiotics: Per Primary team/ID  - Dressings:Iodofore packing  betadine soaked  4x4's Kerlix, ACE wrap  - Nursing staff is able to change/reinforce dressing if & as necessary until next dressing change. Thank you.  - Patient will require surgical intervention  - ID consulted  - Patient scheduled for Left foot TMA tomorrow 5/25/25  - NPO After Midnight  - CT Scan Ordered to evaluate proximal limb infection Left, Pending  - MRI Ordered  for pre-op planning, pending    - Podiatry will continue to follow while in house.  - Follow-up with Dr. Socrates Ramos in [facility] ([address], [place] [address]) DVT ppx: Per Primary team  Weightbearing: WBAT b/l LE  Discharge: Pt to follow up 1 week after discharge with Dr. Ramos    Patient evaluated/discussed with attending Dr. Richard SOW    Case to be discussed with attending, A&P above reflects a tentative plan. Please await for the final signature from the attending physician on service.    Barrington Llanos DPM PGY-3  Podiatric Medicine & Surgery  [id number]  Epic Haiku chat preferred        I saw and evaluated the patient. I personally obtained the key and critical portions of the history and physical exam or was physically present for key and critical portions performed by the resident/fellow. I reviewed the resident/fellow's documentation and discussed the patient with the resident/fellow. I agree with the resident/fellow's medical decision making as documented in the note.    I spent 60 minutes in the professional and overall care of this patient.    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 01/06/2025    Personal history of other diseases of the circulatory system     History of hypertension    Personal history of other endocrine, nutritional and metabolic disease     History of hyperlipidemia    Personal history of other endocrine, nutritional and metabolic disease     History of diabetes mellitus   [2]   Past Surgical History:  Procedure Laterality Date    CARDIAC CATHETERIZATION N/A 1/8/2025    Procedure: Right Heart Cath;  Surgeon: Migel Stanton MD;  Location: [place] [address] Cardiac Cath Lab;  Service: Cardiovascular;  Laterality: N/A;    CARDIAC CATHETERIZATION N/A 1/17/2025    Procedure: Right Heart Cath;  Surgeon: Ana Lea MD;  Location: [place] [address] Cardiac Cath Lab;  Service:  Cardiovascular;  Laterality: N/A;    CT ANGIO NECK  1/25/2023    CT NECK ANGIO W AND WO IV CONTRAST 1/25/2023 DOCTOR OFFICE [facility]GAC[facility]    CT HEAD ANGIO W AND WO IV CONTRAST  1/25/2023    CT HEAD ANGIO W AND WO IV CONTRAST 1/25/2023 DOCTOR OFFICE LEGACY    OTHER SURGICAL HISTORY  10/21/2020    Toe amputation   [3]   Family History  Problem Relation Name Age of Onset    Alzheimer's disease Mother      Diabetes Mother      Lung cancer Mother      Diabetes Father      Lung cancer Father      Pancreatic cancer Father      Diabetes Sister      Lung cancer Sister     [4]   Social History  Tobacco Use    Smoking status: Never     Passive exposure: Never    Smokeless tobacco: Never   Vaping Use    Vaping status: Never Used   Substance Use Topics    Alcohol use: Yes    Drug use: Never   [5] (Not in a hospital admission)  [6] No Known Allergies  [7] atorvastatin, 40 mg, oral, Nightly  carvedilol, 12.5 mg, oral, BID  clindamycin, 600 mg, intravenous, q8h  enoxaparin, 30 mg, subcutaneous, q24h  gabapentin, 300 mg, oral, BID  [Held by provider] hydrALAZINE, 50 mg, oral, TID  [Held by provider] isosorbide mononitrate ER, 30 mg, oral, Daily  polyethylene glycol, 17 g, oral, Daily  [Held by provider] torsemide, 40 mg, oral, Daily  vancomycin, 1,000 mg, intravenous, q24h  [START ON 5/25/2025] vitamin B complex-vitamin C-folic acid, 1 capsule, oral, Daily  [8]    [9] PRN medications: dextrose, dextrose, glucagon, glucagon, oxyCODONE, vancomycin

## 2025-05-24 NOTE — TELEPHONE ENCOUNTER
Called patient to discuss abnormal XR left foot result. XR concerning for osteomyelitis and possible septic arthritis of the 4th metatarsophalangeal joint. Soft tissue gas noted in XR read correlates to area of known wound and packing that extends to the dorsal foot in the interspace. No evidence of ascending or tracking soft tissue gas.     Discussed findings with patient and recommended she present to ED for admission for IV antibiotics, MRI and possible surgical debridement vs. amputation depending on clinical course. All questions were answered and patient is in agreement with above plan.    Appreciate all assistance during admission.       See details of wound center visit 5/23/25.         Ele Zelaya DPM

## 2025-05-24 NOTE — H&P
MEDICINE ADMISSION NOTE  Subjective   History of Present Illness  Nuris Squires is a 59 y.o. 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 Garcia DPM for osteomyelitis of L foot.    She saw her PCP 5/20 and then podiatry 5/23 who followed up foot xrays concerning for osteomyelitis vs septic arthritis with subcutaneous gas. She was recommended to go to the ER.      ED Course:  BP: 135/62  Temperature: 36.8 °C (98.2 °F)  Heart Rate: 70  Respirations: 16  MAP (mmHg): 86  Pulse Ox: 95 %    In the ER, podiatry  consulted however no notes nor provider has seen the patient as of 6:35pm.    ED Interventions:  Medications   vancomycin (Vancocin) pharmacy to dose - pharmacy monitoring (has no administration in time range)   piperacillin-tazobactam (Zosyn) 3.375 g in dextrose (iso) IV 50 mL (has no administration in time range)   vancomycin (Vancocin) 1,000 mg in dextrose 5%  mL (has no administration in time range)     Presently:  Patient denies any fever, chills, lightheadedness, shortness of breath, chest pain, abdominal pain, nor N/V/C/D. She denies sick contacts.    She endorses a several week history of ulceration on her L foot. She states her pain is controlled presently. She states she is anxious about what will need to be done. She states her and her family have been wrapping the foot but it is draining and soaking through the bandaging.     She denies questions and states her NOK/decision makers are her brother and daughter.    Cardiac Hx:  Last echo: 1/3/25   CONCLUSIONS:   1. Left ventricular ejection fraction is normal, calculated by Argueta's biplane at 69%.   2. Spectral Doppler shows a Grade II (pseudonormal pattern) of left ventricular diastolic filling with an elevated left atrial pressure.   3. There is normal right ventricular global systolic function.   4. The left atrium is moderately dilated.   5.  Moderate mitral valve regurgitation.   6. Mechanism of MR Is possibly restricted motion of the posterior leaflet.   7. Moderate tricuspid regurgitation visualized.   8. Moderately elevated right ventricular systolic pressure.   9. Small pericardial effusion.  10. Compared with study dated 1/25/2024, the degree of MR appears moderate today rather than mild on prior study. TR is also worsened.    Last cath: 1/17/25  /65, MAP 86, RA 8, PA 52/14, PA mean 29, wedge 14, Yung CI 9   CONCLUSIONS:   1. Normal right sided filling pressures.   2. Mildly elevated left sided filling pressures.   3. High Cardiac output and index.  1/8/25  RA 14, PA 58/14, PA mean 34, wedge 20, F CI 5.1  CONCLUSIONS:   1. Elevated right and left filling pressures, preserved CO/CI.   2. Mild pulmonary hypertension.    Last EKG:   Encounter Date: 01/02/25   Electrocardiogram, 12-lead PRN ACS symptoms   Result Value    Ventricular Rate 77    Atrial Rate 77    UT Interval 134    QRS Duration 94    QT Interval 402    QTC Calculation(Bazett) 454    P Axis 30    R Axis 23    T Axis 22    QRS Count 13    Q Onset 217    P Onset 150    P Offset 207    T Offset 418    QTC Fredericia 436    Narrative    Normal sinus rhythm  Possible Anterior infarct (cited on or before 15-ISAK-2024)  Abnormal ECG  When compared with ECG of 02-JAN-2025 12:11,  Left posterior fascicular block is no longer Present  Confirmed by Bryce Ellis ([id number]) on 1/17/2025 4:43:52 PM   Stress 10/2019  1. Normal myocardial perfusion study without evidence of ischemia or prior infarction.  2. The left ventricle is normal in size.  3. Normal LV wall motion with an LV EF estimated at greater than 65%.     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 She has never been exposed to tobacco smoke. She has never used smokeless tobacco. She reports current alcohol use. She reports that she does not use drugs.    Family History  Family History[3]     Allergies  Patient has no known allergies.    Objective   Current Vitals  Vitals:    05/24/25 1307   BP: 135/62   Pulse: 70   Resp: 16   Temp: 36.8 °C (98.2 °F)   TempSrc: Tympanic   SpO2: 95%   Weight: 102 kg (225 lb)   Height: 1.575 m (5' 2\")      No intake/output data recorded.    Labs:   CBC            8.1     6.0>---------<364              25.6   BMP  136     104    29                  ---------------------<265     4.4       23     2.46            Ca 8.5 Phos 5.2 Mg 2.90       ALT 9 AST 13 AlkPhos 70 Total bili 0.4       Last VBG:  POCT pH, Venous   Date Value Ref Range Status   01/02/2025 7.34 7.33 - 7.43 pH Final     POCT pCO2, Venous   Date Value Ref Range Status   01/02/2025 47 41 - 51 mm Hg Final     POCT pO2, Venous   Date Value Ref Range Status   01/02/2025 48 (H) 35 - 45 mm Hg Final     POCT Lactate, Venous   Date Value Ref Range Status   01/02/2025 0.7 0.4 - 2.0 mmol/L Final   Last ABG:  No results found for: \"PHART\", \"KJD4RHR\", \"PO2ART\", \"LACTATEART\"    Imaging  XR foot left 3+ views  Result Date: 5/24/2025    New bony erosive changes at the 4th proximal phalanx and metatarsal head highly concerning for osteomyelitis.   Marked soft tissue swelling with possible soft tissue gas which could be gas producing soft tissue infection.   MACRO: Critical Finding:  See findings. Notification was initiated on 5/24/2025 at 8:25 am by  Raymundo Early.  (**-OCF-**)   Signed by: Raymundo Early 5/24/2025 8:25 AM Dictation workstation:   [id number]    Micro/culture data:  No results found for the last 120 days.    Physical Exam  Vitals reviewed.   Constitutional:     "   General: She is not in acute distress.     Appearance: She is obese. She is ill-appearing.   HENT:      Head: Normocephalic and atraumatic.   Cardiovascular:      Rate and Rhythm: Normal rate and regular rhythm.      Pulses: Normal pulses.      Heart sounds: Normal heart sounds. No murmur heard.  Pulmonary:      Effort: Pulmonary effort is normal.      Breath sounds: Normal breath sounds. No wheezing, rhonchi or rales.   Abdominal:      General: Bowel sounds are normal. There is no distension.      Palpations: Abdomen is soft.      Tenderness: There is no abdominal tenderness. There is no guarding or rebound.   Musculoskeletal:      Left lower leg: Edema present.   Skin:     General: Skin is warm and dry.      Capillary Refill: Capillary refill takes 2 to 3 seconds.   Neurological:      Mental Status: She is alert and oriented to person, place, and time.   Psychiatric:         Mood and Affect: Mood normal.         Behavior: Behavior normal.         Thought Content: Thought content normal.         Judgment: Judgment normal.     Medications  Home Meds  Prior to Admission medications    Medication Sig Start Date End Date Taking? Authorizing Provider   amoxicillin-clavulanate (Augmentin) 875-125 mg tablet Take 1 tablet (875 mg) by mouth 2 times a day for 10 days. 5/20/25 5/30/25  Mone Aquino MD   atorvastatin (Lipitor) 40 mg tablet Take 1 tablet (40 mg) by mouth once daily. 1/23/25 5/15/25  SEDA Finney   B complex-vitamin C-folic acid (Nephro-Duke) 0.8 mg tablet Take 1 tablet by mouth once daily. 2/27/25   Mone Aquino MD   blood-glucose sensor (FreeStyle Remington 3 Sensor) device Use to monitor blood glucose. Change sensor every 14 days. 2/1/24   Mone Aquino MD   carvedilol (Coreg) 12.5 mg tablet Take 1 tablet (12.5 mg) by mouth 2 times a day. 1/23/25 5/15/25  SEDA Finney   ferrous sulfate tablet Take 1 tablet (325 mg) by mouth once daily with breakfast. 2/27/25 5/28/25  Mone SOLIMAN  "MD Miles   FreeStyle Remington 3 [place]iner misc Use as instructed to monitor blood glucose. 2/1/24   Mone Aquino MD   gabapentin (Neurontin) 300 mg capsule Take 1 capsule (300 mg) by mouth 2 times a day. 2/20/25   Mone Aquino MD   hydrALAZINE (Apresoline) 50 mg tablet Take 1 tablet (50 mg) by mouth 3 times a day. 5/15/25   Marylin Sparks MD MPH   insulin glargine (Basaglar KwikPen U-100 Insulin) 100 unit/mL (3 mL) pen Inject 15 Units under the skin once daily in the morning. Take as directed per insulin instructions. 1/23/25   TAL Finney-CNP   insulin lispro (Admelog SoloStar U-100 Insulin) 100 unit/mL injection Inject per sliding scale instructions under the skin 3 times a day with meals. (Max daily dose 70 units) 8/20/24   Mone Aquino MD   isosorbide mononitrate ER (Imdur) 30 mg 24 hr tablet Take 1 tablet (30 mg) by mouth once daily. Do not crush or chew. 5/20/25 9/17/25  MD Grace Lunauch Delica Plus Lancet 30 gauge misc USE TO TEST BLOOD SUGAR AS DIRECTED 2/27/24   MD Jose Guadalupe LunaTouch Ultra Test strip Use 1 strip BID to check glucose 5/30/24   MD Jose Guadalupe LunaTouch Ultra2 Meter misc use 1 to 2 times daily 2/27/24   Mone Aquino MD   oxyCODONE (Roxicodone) 5 mg immediate release tablet Take 1 tablet (5 mg) by mouth every 4 hours if needed for severe pain (7 - 10) or moderate pain (4 - 6). 3/12/25   Jessica Nation MD   pen needle, diabetic 32 gauge x 5/32\" needle Use four times a day with insulin use 3/25/24   Mone Aquino MD   sodium hypochlorite (Dakin's HALF-Strength) 0.25 % external solution Apply topically 1 time for 1 dose. Use for daily dressing changes for left foot 5/23/25 5/23/25  Ele Zelaya DPM   tirzepatide (Mounjaro) 5 mg/0.5 mL pen injector Inject 5 mg under the skin 1 (one) time per week. 5/15/25   Mone Aquino MD   torsemide (Demadex) 20 mg tablet Take 2 tablets (40 mg) by mouth once daily. 5/15/25 7/16/25  Marylin Sparks MD MPH "   isosorbide mononitrate ER (Imdur) 30 mg 24 hr tablet Take 1 tablet (30 mg) by mouth once daily. Do not crush or chew. 2/20/25 5/20/25  Mone Aquino MD     Scheduled medications  Scheduled Medications[4]  Continuous medications  Continuous Medications[5]  PRN medications  PRN Medications[6]    Assessment/Plan   Nuris Squires is a 59 y.o. 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 Zelaya DPM for osteomyelitis of L foot. Admitted for severe diabetic foot infection, IV antibiotics, and possible surgical management. Podiatry consulted in ER.    #Osteomyelitis of L foot  #Diabetic foot infection, recurrent  -Vanc/Zosyn ordered in ER prior to culture/biopsies, zosyn start at 5:40pm   -needs toxin producing coverage   -favor Linezolid+Zosyn but can add clinda for now.  -blood cultures ordered, collected ~5:35pm  -Podiatry consulted in ER, pending evaluation tonight for TMA emergent tonight vs urgent tomorrow.  - ID consult in AM given osteomyelitis    #HFpEF  -c/w home meds except hold antihypertensives pending surgical planning    #HTN  #T2DM  #HLD  -c/w home meds  -SSI while inpatient, carb controlled diet when not NPO (pending podiatry evaluation for surgical planning)  -hypoglycemia protocol    Vent: RA  Electrolytes: PRN  Lines/Tubes: PIV  Abx: Vanc/Zosyn/Clinda  Drips: None  Diet: NPO pending surgical eval  GI ppx: None  DVT ppx: Lovenox  Code Status: Full Code (confirmed on admission)   NOK:  Primary Emergency Contact: DAYO CHILDS  Dispo: Floor    05/24/25 at 5:05 PM - Sky Hyde DO  Internal Medicine-Genetics, PGY-2         [1]   Past Medical History:  Diagnosis Date    Hypertensive heart and kidney disease with HF and with CKD stage IV 01/06/2025    Personal history of other diseases of the circulatory system     History of hypertension    Personal history of other endocrine, nutritional and metabolic disease      History of hyperlipidemia    Personal history of other endocrine, nutritional and metabolic disease     History of diabetes mellitus   [2]   Past Surgical History:  Procedure Laterality Date    CARDIAC CATHETERIZATION N/A 1/8/2025    Procedure: Right Heart Cath;  Surgeon: Migel Stanton MD;  Location: [place] [address] Cardiac Cath Lab;  Service: Cardiovascular;  Laterality: N/A;    CARDIAC CATHETERIZATION N/A 1/17/2025    Procedure: Right Heart Cath;  Surgeon: Ana Lea MD;  Location: [place] [address] Cardiac Cath Lab;  Service: Cardiovascular;  Laterality: N/A;    CT ANGIO NECK  1/25/2023    CT NECK ANGIO W AND WO IV CONTRAST 1/25/2023 DOCTOR OFFICE LEGACY    CT HEAD ANGIO W AND WO IV CONTRAST  1/25/2023    CT HEAD ANGIO W AND WO IV CONTRAST 1/25/2023 DOCTOR OFFICE LEGACY    OTHER SURGICAL HISTORY  10/21/2020    Toe amputation   [3]   Family History  Problem Relation Name Age of Onset    Alzheimer's disease Mother      Diabetes Mother      Lung cancer Mother      Diabetes Father      Lung cancer Father      Pancreatic cancer Father      Diabetes Sister      Lung cancer Sister     [4] piperacillin-tazobactam, 3.375 g, intravenous, Once  vancomycin, 1,000 mg, intravenous, q24h  [5]    [6] PRN medications: vancomycin

## 2025-05-24 NOTE — PROGRESS NOTES
5:22 PM significant clinical event note.    Patient's GFR did come back at 22, patient has known history of CKD and there are RFP today does show stable compared to previous however, podiatry did advise that they would like to have CT scan of the lower extremity done at this time to rule out possibility of ascending infection.  Given that the need of contrast is required to assess tissue status of this patient do believe benefits outweigh risk at this time to assess for the emergent tracking infection within the left lower extremity which could be life-threatening.  With this spine do believe benefits outweigh risk at this time for this patient receive IV contrast.    Lamonte Johnson PA-C  5/24/2025

## 2025-05-24 NOTE — ED PROVIDER NOTES
HPI   Chief Complaint   Patient presents with    Foot Injury       59-year-old female PMH HLD, T2DM, CKD stage IV, diabetic nephropathy, HTN, CHF presents ED for chief complaints of foot pain.  Patient reportedly was sent by primary care doctor yesterday.  Patient reportedly had an x-ray done yesterday of the left foot and was told by their podiatry to come to the emergency room for concern for osteomyelitis.  Patient states that about a week and a half ago their left 5th toenail fell off and when they were at their podiatrist and this is what prompted them to be seen.  Denies any fever, chills, nausea, vomiting, constitutional symptoms, chest pain, shortness of breath.  Patient states that they have a longstanding history of neuropathy and do not feel any pain to the bilateral lower extremities.  Patient states she does not know for how long that she has had this wound.              Patient History   Medical History[1]  Surgical History[2]  Family History[3]  Social History[4]    Physical Exam   ED Triage Vitals [05/24/25 1307]   Temperature Heart Rate Respirations BP   36.8 °C (98.2 °F) 70 16 135/62      Pulse Ox Temp Source Heart Rate Source Patient Position   95 % Tympanic -- --      BP Location FiO2 (%)     -- --       Physical Exam  Vitals and nursing note reviewed.   Constitutional:       General: She is not in acute distress.     Appearance: Normal appearance. She is normal weight. She is not ill-appearing or toxic-appearing.   HENT:      Head: Normocephalic and atraumatic.   Cardiovascular:      Rate and Rhythm: Normal rate and regular rhythm.      Heart sounds: Normal heart sounds. No murmur heard.     No gallop.   Pulmonary:      Effort: Pulmonary effort is normal. No respiratory distress.      Breath sounds: Normal breath sounds. No stridor. No wheezing, rhonchi or rales.   Musculoskeletal:      Comments: Putrid, purulent, deep ulceration, infection wound noted to the plantar aspect of the left foot.   Active purulence.  Appears deep in nature.  No significant surrounding erythema, edema.  Sensation intact.  Palpable DP pulses. No lymphatic streaking.   Neurological:      Mental Status: She is alert.           ED Course & MDM   Diagnoses as of 05/24/25 1646   Left foot infection                 No data recorded     [place] Coma Scale Score: 15 (05/24/25 1308 : Suzanne Thakkar RN)                           Medical Decision Making  59-year-old female presents emergency department chief complaints of foot wound.  Patient states that the blood sugars have been stable in the 140s.  Does reportedly take their medications as prescribed.  Reported about a week and a half ago left fifth toenail fell off.  Patient was at the podiatrist office yesterday who did an x-ray concerning for osteomyelitis of the left 4th proximal phalanx and metatarsal.  Patient on exam has a obviously infected, purulent, putrid diabetic foot wound.  Concerning for deep space infection at this time.  Patient neurovascularly intact with no signs of spreading infection or sepsis at this time.  With this in mind we will reach out to podiatry team for recommendations moving forward.  Patient will require admission for IV antibiotics versus likely surgical washout.    4:46 PM was able to speak with podiatry team regarding this patient.  MRI is currently ordered for this patient.  Podiatry team did advise to start vancomycin and Zosyn at this time given likely surgical nature of patient's wound.  Plans to admit to medicine service with podiatry following at this time.  Preop labs ordered per ED team.  Awaiting acceptance for medicine admission.         4:58 PM patient accepted for admission to medicine service.        Amount and/or Complexity of Data Reviewed  Labs:      Details: Chemistries do appear grossly stable compared to previous.  No significant changes in patient's chronic renal failure noted here today.  Blood glucose mildly elevated 265.  No  other evidence of transaminitis or other emergent electrolyte abnormality.  ESR, CRP both mildly elevated as well.  Coagulation screen negative for acute coagulopathy.  CBC no significant leukocytosis or anemia noted.  Currently pending CTs of the left lower extremity including the foot and ankle to rule out tracking infection.      ECG/medicine tests:      Details: EKG showing normal sinus rhythm 67 bpm.  .  QRS 84.  QTc 445.  Right axis deviation.  No acute ST changes.  No significant changes compared to previous.        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 stage IV 01/06/2025    Personal history of other diseases of the circulatory system     History of hypertension    Personal history of other endocrine, nutritional and metabolic disease     History of hyperlipidemia    Personal history of other endocrine, nutritional and metabolic disease     History of diabetes mellitus   [2]   Past Surgical History:  Procedure Laterality Date    CARDIAC CATHETERIZATION N/A 1/8/2025    Procedure: Right Heart Cath;  Surgeon: Migel Stanton MD;  Location: [place] [address] Cardiac Cath Lab;  Service: Cardiovascular;  Laterality: N/A;    CARDIAC CATHETERIZATION N/A 1/17/2025    Procedure: Right Heart Cath;  Surgeon: Ana Lea MD;  Location: [place] [address] Cardiac Cath Lab;  Service: Cardiovascular;  Laterality: N/A;    CT ANGIO NECK  1/25/2023    CT NECK ANGIO W AND WO IV CONTRAST 1/25/2023 DOCTOR OFFICE LEGACY    CT HEAD ANGIO W AND WO IV CONTRAST  1/25/2023    CT HEAD ANGIO W AND WO IV CONTRAST 1/25/2023 DOCTOR OFFICE LEGACY    OTHER SURGICAL HISTORY  10/21/2020    Toe amputation   [3]   Family History  Problem Relation Name Age of Onset    Alzheimer's disease Mother      Diabetes Mother      Lung cancer Mother      Diabetes Father      Lung cancer Father      Pancreatic cancer  Father      Diabetes Sister      Lung cancer Sister     [4]   Social History  Tobacco Use    Smoking status: Never     Passive exposure: Never    Smokeless tobacco: Never   Vaping Use    Vaping status: Never Used   Substance Use Topics    Alcohol use: Yes    Drug use: Never        Lamonte Johnson PA-C  05/24/25 [id number]

## 2025-05-25 ENCOUNTER — ANESTHESIA EVENT (OUTPATIENT)
Dept: OPERATING ROOM | Facility: HOSPITAL | Age: 60
End: 2025-05-25
Payer: COMMERCIAL

## 2025-05-25 ENCOUNTER — ANESTHESIA (OUTPATIENT)
Dept: OPERATING ROOM | Facility: HOSPITAL | Age: 60
End: 2025-05-25
Payer: COMMERCIAL

## 2025-05-25 ENCOUNTER — APPOINTMENT (OUTPATIENT)
Dept: RADIOLOGY | Facility: HOSPITAL | Age: 60
End: 2025-05-25
Payer: COMMERCIAL

## 2025-05-25 LAB
B-HCG SERPL-ACNC: 3 MIU/ML
BACTERIA SPEC AEROBE CULT: NORMAL
BACTERIA SPEC ANAEROBE CULT: NORMAL
BASOPHILS # BLD AUTO: 0.03 X10*3/UL (ref 0–0.1)
BASOPHILS NFR BLD AUTO: 0.6 %
BLOOD EXPIRATION DATE: NORMAL
DISPENSE STATUS: NORMAL
EOSINOPHIL # BLD AUTO: 0.2 X10*3/UL (ref 0–0.7)
EOSINOPHIL NFR BLD AUTO: 4.1 %
ERYTHROCYTE [DISTWIDTH] IN BLOOD BY AUTOMATED COUNT: 13.7 % (ref 11.5–14.5)
GLUCOSE BLD MANUAL STRIP-MCNC: 131 MG/DL (ref 74–99)
GLUCOSE BLD MANUAL STRIP-MCNC: 136 MG/DL (ref 74–99)
GLUCOSE BLD MANUAL STRIP-MCNC: 142 MG/DL (ref 74–99)
GLUCOSE BLD MANUAL STRIP-MCNC: 148 MG/DL (ref 74–99)
GLUCOSE BLD MANUAL STRIP-MCNC: 237 MG/DL (ref 74–99)
GLUCOSE BLD MANUAL STRIP-MCNC: 250 MG/DL (ref 74–99)
HCT VFR BLD AUTO: 22.2 % (ref 36–46)
HGB BLD-MCNC: 6.7 G/DL (ref 12–16)
IMM GRANULOCYTES # BLD AUTO: 0.02 X10*3/UL (ref 0–0.7)
IMM GRANULOCYTES NFR BLD AUTO: 0.4 % (ref 0–0.9)
LYMPHOCYTES # BLD AUTO: 1.65 X10*3/UL (ref 1.2–4.8)
LYMPHOCYTES NFR BLD AUTO: 33.7 %
MCH RBC QN AUTO: 29.8 PG (ref 26–34)
MCHC RBC AUTO-ENTMCNC: 30.2 G/DL (ref 32–36)
MCV RBC AUTO: 99 FL (ref 80–100)
MONOCYTES # BLD AUTO: 0.47 X10*3/UL (ref 0.1–1)
MONOCYTES NFR BLD AUTO: 9.6 %
NEUTROPHILS # BLD AUTO: 2.52 X10*3/UL (ref 1.2–7.7)
NEUTROPHILS NFR BLD AUTO: 51.6 %
NRBC BLD-RTO: 0 /100 WBCS (ref 0–0)
PLATELET # BLD AUTO: 287 X10*3/UL (ref 150–450)
PRODUCT BLOOD TYPE: 6200
PRODUCT CODE: NORMAL
RBC # BLD AUTO: 2.25 X10*6/UL (ref 4–5.2)
UNIT ABO: NORMAL
UNIT NUMBER: NORMAL
UNIT RH: NORMAL
UNIT VOLUME: 350
VANCOMYCIN SERPL-MCNC: 10.5 UG/ML (ref 5–20)
WBC # BLD AUTO: 4.9 X10*3/UL (ref 4.4–11.3)
XM INTEP: NORMAL

## 2025-05-25 PROCEDURE — 2500000004 HC RX 250 GENERAL PHARMACY W/ HCPCS (ALT 636 FOR OP/ED)

## 2025-05-25 PROCEDURE — 87077 CULTURE AEROBIC IDENTIFY: CPT | Performed by: PODIATRIST

## 2025-05-25 PROCEDURE — 82947 ASSAY GLUCOSE BLOOD QUANT: CPT

## 2025-05-25 PROCEDURE — 36430 TRANSFUSION BLD/BLD COMPNT: CPT

## 2025-05-25 PROCEDURE — 36415 COLL VENOUS BLD VENIPUNCTURE: CPT

## 2025-05-25 PROCEDURE — 05H833Z INSERTION OF INFUSION DEVICE INTO LEFT AXILLARY VEIN, PERCUTANEOUS APPROACH: ICD-10-PCS | Performed by: STUDENT IN AN ORGANIZED HEALTH CARE EDUCATION/TRAINING PROGRAM

## 2025-05-25 PROCEDURE — 99233 SBSQ HOSP IP/OBS HIGH 50: CPT | Performed by: STUDENT IN AN ORGANIZED HEALTH CARE EDUCATION/TRAINING PROGRAM

## 2025-05-25 PROCEDURE — 87102 FUNGUS ISOLATION CULTURE: CPT | Performed by: PODIATRIST

## 2025-05-25 PROCEDURE — 2500000005 HC RX 250 GENERAL PHARMACY W/O HCPCS: Mod: JZ | Performed by: PODIATRIST

## 2025-05-25 PROCEDURE — 0Y6N0ZF DETACHMENT AT LEFT FOOT, PARTIAL 5TH RAY, OPEN APPROACH: ICD-10-PCS | Performed by: PODIATRIST

## 2025-05-25 PROCEDURE — 7100000002 HC RECOVERY ROOM TIME - EACH INCREMENTAL 1 MINUTE: Performed by: PODIATRIST

## 2025-05-25 PROCEDURE — 3600000003 HC OR TIME - INITIAL BASE CHARGE - PROCEDURE LEVEL THREE: Performed by: PODIATRIST

## 2025-05-25 PROCEDURE — 3600000008 HC OR TIME - EACH INCREMENTAL 1 MINUTE - PROCEDURE LEVEL THREE: Performed by: PODIATRIST

## 2025-05-25 PROCEDURE — 36410 VNPNXR 3YR/> PHY/QHP DX/THER: CPT

## 2025-05-25 PROCEDURE — 3700000002 HC GENERAL ANESTHESIA TIME - EACH INCREMENTAL 1 MINUTE: Performed by: PODIATRIST

## 2025-05-25 PROCEDURE — 2780000003 HC OR 278 NO HCPCS

## 2025-05-25 PROCEDURE — P9016 RBC LEUKOCYTES REDUCED: HCPCS

## 2025-05-25 PROCEDURE — 2500000004 HC RX 250 GENERAL PHARMACY W/ HCPCS (ALT 636 FOR OP/ED): Performed by: PODIATRIST

## 2025-05-25 PROCEDURE — 3700000001 HC GENERAL ANESTHESIA TIME - INITIAL BASE CHARGE: Performed by: PODIATRIST

## 2025-05-25 PROCEDURE — 80202 ASSAY OF VANCOMYCIN: CPT

## 2025-05-25 PROCEDURE — 2720000007 HC OR 272 NO HCPCS: Performed by: PODIATRIST

## 2025-05-25 PROCEDURE — 2500000001 HC RX 250 WO HCPCS SELF ADMINISTERED DRUGS (ALT 637 FOR MEDICARE OP)

## 2025-05-25 PROCEDURE — 0QBP0ZZ EXCISION OF LEFT METATARSAL, OPEN APPROACH: ICD-10-PCS | Performed by: PODIATRIST

## 2025-05-25 PROCEDURE — 2500000004 HC RX 250 GENERAL PHARMACY W/ HCPCS (ALT 636 FOR OP/ED): Mod: JZ | Performed by: STUDENT IN AN ORGANIZED HEALTH CARE EDUCATION/TRAINING PROGRAM

## 2025-05-25 PROCEDURE — C1751 CATH, INF, PER/CENT/MIDLINE: HCPCS

## 2025-05-25 PROCEDURE — 7100000001 HC RECOVERY ROOM TIME - INITIAL BASE CHARGE: Performed by: PODIATRIST

## 2025-05-25 PROCEDURE — 2500000004 HC RX 250 GENERAL PHARMACY W/ HCPCS (ALT 636 FOR OP/ED): Mod: JZ | Performed by: NURSE ANESTHETIST, CERTIFIED REGISTERED

## 2025-05-25 PROCEDURE — A28805 PR AMPUTATION FOOT,TRANSMETATARSAL: Performed by: ANESTHESIOLOGY

## 2025-05-25 PROCEDURE — 85027 COMPLETE CBC AUTOMATED: CPT | Performed by: STUDENT IN AN ORGANIZED HEALTH CARE EDUCATION/TRAINING PROGRAM

## 2025-05-25 PROCEDURE — A28805 PR AMPUTATION FOOT,TRANSMETATARSAL: Performed by: NURSE ANESTHETIST, CERTIFIED REGISTERED

## 2025-05-25 PROCEDURE — 2500000004 HC RX 250 GENERAL PHARMACY W/ HCPCS (ALT 636 FOR OP/ED): Mod: JZ

## 2025-05-25 PROCEDURE — 85025 COMPLETE CBC W/AUTO DIFF WBC: CPT

## 2025-05-25 PROCEDURE — 1100000001 HC PRIVATE ROOM DAILY

## 2025-05-25 PROCEDURE — 2780000003 HC OR 278 NO HCPCS: Performed by: PODIATRIST

## 2025-05-25 PROCEDURE — 0Y6N0ZD DETACHMENT AT LEFT FOOT, PARTIAL 4TH RAY, OPEN APPROACH: ICD-10-PCS | Performed by: PODIATRIST

## 2025-05-25 PROCEDURE — 2500000002 HC RX 250 W HCPCS SELF ADMINISTERED DRUGS (ALT 637 FOR MEDICARE OP, ALT 636 FOR OP/ED)

## 2025-05-25 PROCEDURE — 84702 CHORIONIC GONADOTROPIN TEST: CPT

## 2025-05-25 PROCEDURE — 2500000005 HC RX 250 GENERAL PHARMACY W/O HCPCS: Performed by: ANESTHESIOLOGY

## 2025-05-25 RX ORDER — HYDROMORPHONE HYDROCHLORIDE 0.2 MG/ML
0.2 INJECTION INTRAMUSCULAR; INTRAVENOUS; SUBCUTANEOUS EVERY 5 MIN PRN
Status: DISCONTINUED | OUTPATIENT
Start: 2025-05-25 | End: 2025-05-25 | Stop reason: HOSPADM

## 2025-05-25 RX ORDER — ONDANSETRON HYDROCHLORIDE 2 MG/ML
4 INJECTION, SOLUTION INTRAVENOUS ONCE AS NEEDED
Status: DISCONTINUED | OUTPATIENT
Start: 2025-05-25 | End: 2025-05-25 | Stop reason: HOSPADM

## 2025-05-25 RX ORDER — LIDOCAINE HYDROCHLORIDE 10 MG/ML
0.1 INJECTION, SOLUTION INFILTRATION; PERINEURAL ONCE
Status: DISCONTINUED | OUTPATIENT
Start: 2025-05-25 | End: 2025-05-25 | Stop reason: HOSPADM

## 2025-05-25 RX ORDER — OXYCODONE HYDROCHLORIDE 5 MG/1
5 TABLET ORAL EVERY 4 HOURS PRN
Refills: 0 | Status: DISCONTINUED | OUTPATIENT
Start: 2025-05-25 | End: 2025-05-25 | Stop reason: HOSPADM

## 2025-05-25 RX ORDER — BUPIVACAINE HYDROCHLORIDE 5 MG/ML
INJECTION, SOLUTION EPIDURAL; INTRACAUDAL; PERINEURAL AS NEEDED
Status: DISCONTINUED | OUTPATIENT
Start: 2025-05-25 | End: 2025-05-25 | Stop reason: HOSPADM

## 2025-05-25 RX ORDER — MIDAZOLAM HYDROCHLORIDE 1 MG/ML
INJECTION INTRAMUSCULAR; INTRAVENOUS AS NEEDED
Status: DISCONTINUED | OUTPATIENT
Start: 2025-05-25 | End: 2025-05-25

## 2025-05-25 RX ORDER — LIDOCAINE HYDROCHLORIDE 20 MG/ML
INJECTION, SOLUTION INFILTRATION; PERINEURAL AS NEEDED
Status: DISCONTINUED | OUTPATIENT
Start: 2025-05-25 | End: 2025-05-25

## 2025-05-25 RX ORDER — FENTANYL CITRATE 50 UG/ML
INJECTION, SOLUTION INTRAMUSCULAR; INTRAVENOUS AS NEEDED
Status: DISCONTINUED | OUTPATIENT
Start: 2025-05-25 | End: 2025-05-25

## 2025-05-25 RX ORDER — PROPOFOL 10 MG/ML
INJECTION, EMULSION INTRAVENOUS AS NEEDED
Status: DISCONTINUED | OUTPATIENT
Start: 2025-05-25 | End: 2025-05-25

## 2025-05-25 RX ORDER — LIDOCAINE HYDROCHLORIDE 10 MG/ML
5 INJECTION, SOLUTION INFILTRATION; PERINEURAL ONCE
Status: DISCONTINUED | OUTPATIENT
Start: 2025-05-25 | End: 2025-06-05 | Stop reason: HOSPADM

## 2025-05-25 RX ORDER — SODIUM CHLORIDE 0.9 G/100ML
INJECTION, SOLUTION IRRIGATION AS NEEDED
Status: DISCONTINUED | OUTPATIENT
Start: 2025-05-25 | End: 2025-05-25 | Stop reason: HOSPADM

## 2025-05-25 RX ORDER — SODIUM CHLORIDE, SODIUM LACTATE, POTASSIUM CHLORIDE, CALCIUM CHLORIDE 600; 310; 30; 20 MG/100ML; MG/100ML; MG/100ML; MG/100ML
100 INJECTION, SOLUTION INTRAVENOUS CONTINUOUS
Status: ACTIVE | OUTPATIENT
Start: 2025-05-25 | End: 2025-05-25

## 2025-05-25 RX ORDER — ONDANSETRON HYDROCHLORIDE 2 MG/ML
INJECTION, SOLUTION INTRAVENOUS AS NEEDED
Status: DISCONTINUED | OUTPATIENT
Start: 2025-05-25 | End: 2025-05-25

## 2025-05-25 RX ADMIN — CLINDAMYCIN IN 5 PERCENT DEXTROSE 600 MG: 12 INJECTION, SOLUTION INTRAVENOUS at 23:46

## 2025-05-25 RX ADMIN — CARVEDILOL 12.5 MG: 12.5 TABLET, FILM COATED ORAL at 21:17

## 2025-05-25 RX ADMIN — MIDAZOLAM HYDROCHLORIDE 2 MG: 2 INJECTION, SOLUTION INTRAMUSCULAR; INTRAVENOUS at 07:52

## 2025-05-25 RX ADMIN — LIDOCAINE HYDROCHLORIDE 40 MG: 20 INJECTION, SOLUTION INFILTRATION; PERINEURAL at 08:07

## 2025-05-25 RX ADMIN — GABAPENTIN 300 MG: 300 CAPSULE ORAL at 12:43

## 2025-05-25 RX ADMIN — INSULIN LISPRO 2 UNITS: 100 INJECTION, SOLUTION INTRAVENOUS; SUBCUTANEOUS at 16:34

## 2025-05-25 RX ADMIN — Medication 6 L/MIN: at 09:01

## 2025-05-25 RX ADMIN — ASCORBIC ACID, THIAMINE MONONITRATE,RIBOFLAVIN, NIACINAMIDE, PYRIDOXINE HYDROCHLORIDE, FOLIC ACID, CYANOCOBALAMIN, BIOTIN, CALCIUM PANTOTHENATE, 1 CAPSULE: 100; 1.5; 1.7; 20; 10; 1; 6000; 150000; 5 CAPSULE, LIQUID FILLED ORAL at 12:46

## 2025-05-25 RX ADMIN — SODIUM CHLORIDE, SODIUM LACTATE, POTASSIUM CHLORIDE, AND CALCIUM CHLORIDE: 600; 310; 30; 20 INJECTION, SOLUTION INTRAVENOUS at 07:51

## 2025-05-25 RX ADMIN — ATORVASTATIN CALCIUM 40 MG: 40 TABLET, FILM COATED ORAL at 21:17

## 2025-05-25 RX ADMIN — Medication 3 L/MIN: at 09:10

## 2025-05-25 RX ADMIN — ONDANSETRON 4 MG: 2 INJECTION, SOLUTION INTRAMUSCULAR; INTRAVENOUS at 08:55

## 2025-05-25 RX ADMIN — FENTANYL CITRATE 50 MCG: 50 INJECTION, SOLUTION INTRAMUSCULAR; INTRAVENOUS at 07:52

## 2025-05-25 RX ADMIN — PROPOFOL 200 MG: 10 INJECTION, EMULSION INTRAVENOUS at 08:07

## 2025-05-25 RX ADMIN — CARVEDILOL 12.5 MG: 12.5 TABLET, FILM COATED ORAL at 12:43

## 2025-05-25 RX ADMIN — GABAPENTIN 300 MG: 300 CAPSULE ORAL at 21:17

## 2025-05-25 RX ADMIN — PIPERACILLIN SODIUM AND TAZOBACTAM SODIUM 2.25 G: 2; .25 INJECTION, SOLUTION INTRAVENOUS at 13:39

## 2025-05-25 RX ADMIN — CLINDAMYCIN IN 5 PERCENT DEXTROSE 600 MG: 12 INJECTION, SOLUTION INTRAVENOUS at 12:45

## 2025-05-25 RX ADMIN — Medication 2 L/MIN: at 09:30

## 2025-05-25 RX ADMIN — FENTANYL CITRATE 50 MCG: 50 INJECTION, SOLUTION INTRAMUSCULAR; INTRAVENOUS at 08:07

## 2025-05-25 RX ADMIN — CLINDAMYCIN IN 5 PERCENT DEXTROSE 600 MG: 12 INJECTION, SOLUTION INTRAVENOUS at 03:53

## 2025-05-25 RX ADMIN — VANCOMYCIN HYDROCHLORIDE 1000 MG: 1 INJECTION, SOLUTION INTRAVENOUS at 21:38

## 2025-05-25 SDOH — HEALTH STABILITY: MENTAL HEALTH: CURRENT SMOKER: 0

## 2025-05-25 ASSESSMENT — PAIN - FUNCTIONAL ASSESSMENT
PAIN_FUNCTIONAL_ASSESSMENT: 0-10

## 2025-05-25 ASSESSMENT — PAIN SCALES - GENERAL
PAINLEVEL_OUTOF10: 0 - NO PAIN
PAIN_LEVEL: 0
PAINLEVEL_OUTOF10: 0 - NO PAIN

## 2025-05-25 ASSESSMENT — COGNITIVE AND FUNCTIONAL STATUS - GENERAL
PERSONAL GROOMING: A LITTLE
WALKING IN HOSPITAL ROOM: A LITTLE
MOBILITY SCORE: 21
DAILY ACTIVITIY SCORE: 22
CLIMB 3 TO 5 STEPS WITH RAILING: A LITTLE
PERSONAL GROOMING: A LITTLE
WALKING IN HOSPITAL ROOM: A LITTLE
MOVING TO AND FROM BED TO CHAIR: A LITTLE
EATING MEALS: A LITTLE
DAILY ACTIVITIY SCORE: 22
CLIMB 3 TO 5 STEPS WITH RAILING: A LITTLE
EATING MEALS: A LITTLE
MOBILITY SCORE: 21
MOVING TO AND FROM BED TO CHAIR: A LITTLE

## 2025-05-25 NOTE — ANESTHESIA POSTPROCEDURE EVALUATION
Patient: Nuris Squires    Procedure Summary       Date: 05/25/25 Room / Location: [facility] OR 17 / [facility]    Anesthesia Start: 0752 Anesthesia Stop: 0903    Procedures:       AMPUTATION, FOOT, TRANSMETATARSAL (Left)      DEBRIDEMENT, LOWER EXTREMITY (Left) Diagnosis:       Left foot infection      Chronic ulcer of left foot with fat layer exposed (Multi)      (Left foot infection [L08.9])      (Chronic ulcer of left foot with fat layer exposed (Multi) [L97.522])    Surgeons: Socrates Ramos DPM Responsible Provider: Iman Collins MD    Anesthesia Type: general ASA Status: 3            Anesthesia Type: general    Vitals Value Taken Time   /75 05/25/25 09:00   Temp 36 °C (96.8 °F) 05/25/25 09:00   Pulse 60 05/25/25 09:00   Resp 14 05/25/25 09:00   SpO2 100 % 05/25/25 09:00       Anesthesia Post Evaluation    Patient location during evaluation: bedside  Patient participation: complete - patient participated  Level of consciousness: awake  Pain score: 0  Pain management: adequate  Airway patency: patent  Cardiovascular status: acceptable  Respiratory status: acceptable and face mask  Hydration status: acceptable  Postoperative Nausea and Vomiting: none        There were no known notable events for this encounter.

## 2025-05-25 NOTE — PROGRESS NOTES
Pt with operative blood loss, hb< 7, ongoing post op bleeding requiring reinforcement of wound. Pt without iv access, new iv access obtained and infiltrated. No suitable iv options per iv team. Ordered stat midline for blood transfusion, benefits outweigh risks at this time in the setting of her ckd

## 2025-05-25 NOTE — PROGRESS NOTES
Vancomycin Dosing by Pharmacy  FOLLOW UP    Nuris Squires is a 59 y.o. female who Pharmacy is consulted to dose vancomycin for osteomyelitis.     Based on the patient's indication and renal status, this patient is being dosed based on a goal vancomycin AUC of 500-600.     ESRD without HD. Renal function is at patient's baseline.     Estimated Creatinine Clearance: 27.2 mL/min (A) (by C-G formula based on SCr of 2.46 mg/dL (H)).    Results from last 7 days   Lab Units 05/24/25  1327   CREATININE mg/dL 2.46*   BUN mg/dL 29*   WBC AUTO x10*3/uL 6.0        Visit Vitals  /76   Pulse 67   Temp 36.5 °C (97.7 °F)   Resp 18       Staph/MRSA Screen Culture   Date/Time Value Ref Range Status   01/25/2024 12:34 PM No Staphylococcus aureus isolated  Final     CULTURE, ANAEROBIC BACTERIA W/GRAM STAIN   Date/Time Value Ref Range Status   05/23/2025 05:00 PM SEE NOTE  Preliminary     Comment:         CULTURE, ANAEROBIC BACTERIA W/GRAM STAIN         Micro Number:      [id number]    Test Status:       Preliminary    Specimen Source:   Foot, left    Specimen Quality:  Adequate    Gram Stain:        Few White blood cells seen                       Many Gram positive cocci                       Moderate Gram negative bacilli       CULTURE, URINE, ROUTINE   Date/Time Value Ref Range Status   03/18/2025 12:51 PM SEE NOTE (A)  Final     Comment:         CULTURE, URINE, ROUTINE         Micro Number:      [id number]    Test Status:       Final    Specimen Source:   Urine, catheter    Specimen Quality:  Adequate    Result:            Greater than 100,000 CFU/mL of Escherichia coli                       Greater than 100,000 CFU/mL of Providencia stuartii                              E.coli             P.stuartii                            ----------------   ----------------                            INT   STEVEN          INT   STEVEN     AMOX/CLAVULANATE       S     4            *     AMP/SULBACTAM          S     <=2          R     >=32      CEFAZOLIN              NR    <=4 **2      R     >=64 **3     CEFEPIME               S     <=0.12       S     0.25     CEFTAZIDIME            S     <=1          S     <=1     CEFTRIAXONE            S     <=0.25       R     32     CIPROFLOXACIN          S     <=0.06       S     <=0.06     GENTAMICIN             S     <=1          S     2     IMIPENEM               S     <=0.25       *     LEVOFLOXACIN           S     <=0.12       S     0.5     MEROPENEM              S     <=0.25       S     <=0.25     NITROFURANTOIN         S     <=16         R     128     PIP/TAZOBACTAM         S     <=4          S     8     TRIMETHOPRIM/SULFA     S     <=20         S     <=20    S = Susceptible  I = Intermediate  R = Resistant  NS = Not susceptible  SDD = Susceptible Dose Dependent  * = Not Tested  NR = Not Reported  **NN = See Therapy Comments      THERAPY COMMENTS        Note 1:      For infections other than uncomplicated UTI      caused by E. coli, K. pneumoniae or P. mirabilis:      Cefazolin is resistant if STEVEN > or = 8 mcg/mL.      (Distinguishing susceptible versus intermediate      for isolates with STEVEN < or = 4 mcg/mL requires      additional testing.)        Note 2:      For uncomplicated UTI caused by E. coli,      K. pneumoniae or P. mirabilis: Cefazolin is      susceptible if STEVEN <32 mcg/mL and predicts      susceptible to the oral agents cefaclor, cefdinir,      cefpodoxime, cefprozil, cefuroxime, cephalexin      and loracarbef.        Note 3:      For uncomplicated UTI caused by E. coli,      K. pneumoniae or P. mirabilis: Cefazolin is      susceptible if STEVEN <32 mcg/mL and predicts      susceptible to the oral agents cefaclor, cefdinir,      cefpodoxime, cefprozil, cefuroxime, cephalexin      and loracarbef.       Blood Culture   Date/Time Value Ref Range Status   05/24/2025 05:28 PM Loaded on Instrument - Culture in progress  Preliminary   05/24/2025 05:28 PM Loaded on Instrument - Culture in progress  Preliminary      CULTURE, AEROBIC BACTERIA   Date/Time Value Ref Range Status   05/23/2025 05:00 PM SEE NOTE  Preliminary        No results found for the last 90 days.      Assessment/Plan    AUC is within goal range. Continue current vancomycin regimen. This dosing regimen is predicted by [employer] to result in the following pharmacokinetic parameters:     Regimen: 1000 mg IV every 24 hours.  Start time: 18:01 on 05/25/2025  Exposure target: AUC24 (range) 400-600 mg/L.hr   [id number]: 490 mg/L.hr  AUC24,ss: 600 mg/L.hr  Probability of AUC24 > 400: 95 %  Ctrough,ss: 19.2 mg/L  Probability of Ctrough,ss > 20: 46 %    The next vancomycin level will be ordered for 5/27 AM labs, unless clinically indicated sooner.  Will continue to monitor renal function daily while on vancomycin and order serum creatinine at least every 48 hours if not already ordered.  Will follow for continued vancomycin needs, clinical response, and signs/symptoms of toxicity.     Jasmina Byers, [facility]

## 2025-05-25 NOTE — OP NOTE
AMPUTATION, FOOT, TRANSMETATARSAL (L), DEBRIDEMENT, LOWER EXTREMITY (L) Operative Note     Date: 2025  OR Location: [facility] OR    Name: Nuris Squires, : 1965, Age: 59 y.o., MRN: [id number], Sex: female    Diagnosis  Pre-op Diagnosis      * Left foot infection [L08.9]     * Chronic ulcer of left foot with fat layer exposed (Multi) [L97.522] Post-op Diagnosis     * Left foot infection [L08.9]     * Chronic ulcer of left foot with fat layer exposed (Multi) [L97.522]     Procedures  AMPUTATION, FOOT, TRANSMETATARSAL  94724 - [place] AMPUTATION FOOT TRANSMETARSAL    DEBRIDEMENT, LOWER EXTREMITY  88809 - [place] DEBRIDEMENT BONE 1ST 20 SQ CM/<      Surgeons      * Socrates Ramos - Primary    Resident/Fellow/Other Assistant:  Barrington Llanos PGY3 dpm    Staff:   Circulator: Heena Hester Person: Britta    Anesthesia Staff: Anesthesiologist: Iman Collins MD  CRNA: TAL Sheppard-TATYANA    Procedure Summary  Anesthesia: General  ASA: III  Estimated Blood Loss: 80mL  Intra-op Medications:   Administrations occurring from 0745 to 0950 on 25:   Medication Name Total Dose   sodium chloride 0.9 % irrigation solution 1,000 mL   bupivacaine PF (Marcaine) 0.5 % (5 mg/mL) injection 10 mL   carvedilol (Coreg) tablet 12.5 mg Cannot be calculated   gabapentin (Neurontin) capsule 300 mg Cannot be calculated   hydrALAZINE (Apresoline) tablet 50 mg Cannot be calculated   isosorbide mononitrate ER (Imdur) 24 hr tablet 30 mg Cannot be calculated   oxygen (O2) therapy 174 L   polyethylene glycol (Glycolax, Miralax) packet 17 g Cannot be calculated   torsemide (Demadex) tablet 40 mg Cannot be calculated   vitamin B complex-vitamin C-folic acid (Nephrocaps) capsule 1 capsule Cannot be calculated   fentaNYL (Sublimaze) injection 50 mcg/mL 100 mcg   LR bolus Cannot be calculated   lidocaine (Xylocaine) injection 2 % 40 mg   midazolam PF (Versed) injection 1 mg/mL 2 mg   ondansetron 2 mg/mL 4 mg   propofol (Diprivan) injection 10  mg/mL 200 mg              Anesthesia Record               Intraprocedure I/O Totals          Intake    LR bolus 1200.00 mL    Total Intake 1200 mL       Output    Urine 0 mL    Est. Blood Loss 100 mL    Total Output 100 mL       Net    Net Volume 1100 mL          Specimen:   ID Type Source Tests Collected by Time   1 : FOREFOOT Tissue FOOT AMPUTATION LEFT SURGICAL PATHOLOGY EXAM Socrates Ramos DPM 5/25/2025 0909   A : LEFT FOOT DEEP SOFT TISSUE Swab SOFT TISSUE RESECTION FUNGAL CULTURE/SMEAR, TISSUE/WOUND CULTURE/SMEAR Socrates Ramos DPM 5/25/2025 0911                 Drains and/or Catheters:   External Urinary Catheter Female (Active)   Present on Admission to Healthcare Facility Y 05/25/25 0746   Wall Suction (mmHg) 50 05/25/25 0746   External Catheter Status In place 05/25/25 0746   Securement Method Adhesive device 05/25/25 0746   Output (mL) 0 mL 05/25/25 0911       [REMOVED] Urethral Catheter (Removed)       [REMOVED] Urethral Catheter 14 Fr. (Removed)       [REMOVED] External Urinary Catheter Female (Removed)       [REMOVED] External Urinary Catheter (Removed)       Tourniquet Times: None        Implants: None    Findings: Intraoperative findings were the same as preoperative findings clinically and radiographically.     Indications: Nuris Squires is an 59 y.o. female who is having surgery for Left foot infection [L08.9]  Chronic ulcer of left foot with fat layer exposed (Multi) [L97.522].     The patient was seen in the preoperative area. The risks, benefits, complications, treatment options, non-operative alternatives, expected recovery and outcomes were discussed with the patient. The possibilities of reaction to medication, pulmonary aspiration, injury to surrounding structures, bleeding, recurrent infection, the need for additional procedures, failure to diagnose a condition, and creating a complication requiring transfusion or operation were discussed with the patient. The patient concurred with the  proposed plan, giving informed consent.  The site of surgery was properly noted/marked if necessary per policy. The patient has been actively warmed in preoperative area. Preoperative antibiotics have been ordered and given within 2 hours of incision. Venous thrombosis prophylaxis have been ordered including unilateral sequential compression device    Procedure Details:   The patient with diagnosis as outlined above presents for podiatric surgical intervention today. The patient has attempted and failed conservative treatment as outlined in preoperative clinic notes and wishes to proceed with surgical intervention at this time. The nature of the deformity, problems anticipated procedures, recovery/convalescence, risks/complications including but not limited to numbness, CRPS, over/under correction, problems healing of soft tissue or bone, postoperative wound infection, wound dehiscence, DVT and/or persistent pain/disability have been explained to the patient in detail. An updated H&P and consent have been completed prior to today's surgical intervention. The patient states that they have been NPO since midnight. No guarantees were given or implied, but it is expected that the patient will have a favorable outcome.  It is with this understanding that we proceed.     PRE-PROCEDURE INFORMATION:  In pre-op holding area, the Left extremity to be operated on was clearly marked and the patient verified correct laterality of the marking.  The patient was brought to the operating room and placed on the operating table in the supine position.  A timeout was performed in which identification of the correct patient, procedure, location, and materials was done. The Left foot was then scrubbed, prepped and draped in the usual aseptic manner.     Procedure Details:     Attention was directed to the Left forefoot where an incision was made at a level of the metatarsals such that this incision allowed adequate coverage of the  remaining metatarsal bones for closure while still allowing for resection of the infected bone. This incision was deepened to the level of the bone and utilizing sharp dissection, the distal aspects of the 4th and 5th metatarsals were removed after a sagittal saw was used to perform the osteotomy of the metatarsals.  It was clear after resection of the bone that the remaining bone left intact was viable and appeared to have no signs of osteomyelitis or other infection. Utilizing a #10 blade and other instrumentation, all devitalized soft tissue was removed from the area. Bleeders were cauterized with a boivie and some intermetatarsal arteries were tied with 3-0 Silk. The infected bone that had been amputated was sent for culture and pathology, and a proximal margin was sent as well.  The wound was debrided with the Cellufun debridement system and left open for infection purposes, to be closed via delayed primary closure at a future date. Then surgicel powder was applied over the  open wound to coagulate any remaining bleeders.     All surgical wounds were irrigated thoroughly with saline with antibiotic in the solution. . Dressing was placed on the surgical extremity consisting of 0.125% Dakins solution soaked  4x4's, Krelix., ACE wrap The patient was placed in a surgical shoe.    POSTOPERATIVE INFORMATION: The patient tolerated the above noted procedure and anesthesia well and was transferred to the PACU with vital signs stable, and vascular status intact with capillary refill intact to the most distal aspect of the operative extremity. Postoperative instructions reviewed in detail with the patient and family with written instructions provided. Patient will return to floor. Should any problems, questions, or concerns arise, primary team to le[facility] or [facility] podiatry team.    This is Barrington Llanos DPM dictating the operative note for Dr. Richard DPM.   Evidence of Infection: Yes; Purulent fluid Below the level of the  fascia (organ/space)  Complications:  None; patient tolerated the procedure well.    Disposition: PACU - hemodynamically stable.  Condition: stable                 Additional Details: None    Attending Attestation: I was present and scrubbed for the entire procedure.    Socrates Ramos  Phone Number: [phone]

## 2025-05-25 NOTE — ANESTHESIA PREPROCEDURE EVALUATION
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   (+) Mixed hyperlipidemia   (+) Peripheral arterial disease   (+) Peripheral vascular disease      /Renal   (+) Chronic renal insufficiency      Endocrine   (+) Autonomic neuropathy due to type 2 diabetes mellitus (Multi)   (+) Diabetic autonomic neuropathy associated with type 2 diabetes mellitus   (+) Diabetic nephropathy (Multi)   (+) Morbid obesity (Multi)   (+) 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, with long-term current use of insulin   (+) Type 2 diabetes mellitus without complication   (+) Type 2 diabetes mellitus without complications      Hematology   (+) Anemia      Musculoskeletal   (+) Chronic left-sided low back pain with left-sided sciatica      ID   (+) Left foot infection       Clinical information reviewed:   Tobacco  Allergies    Med Hx  Surg Hx  OB Status  Fam Hx  Soc Hx        NPO Detail:  NPO/Void Status  Date of Last Liquid: 05/25/25  Time of Last Liquid: 0000  Date of Last Solid: 05/25/25  Time of Last Solid: 0000  Time of Last Void: 0000         Physical Exam    Airway  Mallampati: III  Neck ROM: full     Cardiovascular    Dental    Pulmonary    Abdominal            Anesthesia Plan    History of general anesthesia?: yes  History of complications of general anesthesia?: no    ASA 3     general     The patient is not a current smoker.    Anesthetic plan and risks discussed with patient and spouse.  Use of blood products discussed with patient and spouse who.    Plan discussed with CRNA.

## 2025-05-25 NOTE — NURSING NOTE
VAST RN consulted for PIV restart. 1 Attempt made, unsuccessful. No other sustainable veins noted with and without ultrasound. Bedside RN aware that alternative access is needed.

## 2025-05-25 NOTE — HOSPITAL COURSE
marbella Squires is a 59 y.o. 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 Imaging concerning for gas formation. Started on vanc/zosyn/clinda, underwent L 4th and 5th metatarsal amputation and washout  on 5/25, pending cultures. Post-operatively with some bleeding and anemia requiring reinforcement of wound dressing and transfusion.

## 2025-05-25 NOTE — CARE PLAN
The patient's goals for the shift include relax    The clinical goals for the shift include pt to have pain controlled      Problem: Diabetes  Goal: Achieve decreasing blood glucose levels by end of shift  5/25/2025 1033 by Lucie Sequeira RN  Outcome: Progressing  5/25/2025 1030 by Lucie Sequeira RN  Outcome: Progressing  Goal: Increase stability of blood glucose readings by end of shift  5/25/2025 1033 by Lucie Sequeira RN  Outcome: Progressing  5/25/2025 1030 by Lucie Sequeira RN  Outcome: Progressing  Goal: Decrease in ketones present in urine by end of shift  5/25/2025 1033 by Lucie Sequeira RN  Outcome: Progressing  5/25/2025 1030 by Lucie Sequeira RN  Outcome: Progressing  Goal: Maintain electrolyte levels within acceptable range throughout shift  5/25/2025 1033 by Lucie Sequeira RN  Outcome: Progressing  5/25/2025 1030 by Lucie Sequeira RN  Outcome: Progressing  Goal: Maintain glucose levels >70mg/dl to <250mg/dl throughout shift  5/25/2025 1033 by Lucie Sequeira RN  Outcome: Progressing  5/25/2025 1030 by Lucie Sequeira RN  Outcome: Progressing  Goal: No changes in neurological exam by end of shift  5/25/2025 1033 by Lucie Sequeira RN  Outcome: Progressing  5/25/2025 1030 by Lucie Sequeira RN  Outcome: Progressing  Goal: Learn about and adhere to nutrition recommendations by end of shift  5/25/2025 1033 by Lucie Sequeira RN  Outcome: Progressing  5/25/2025 1030 by Lucie Sequeira RN  Outcome: Progressing  Goal: Vital signs within normal range for age by end of shift  5/25/2025 1033 by Lucie Sequeira RN  Outcome: Progressing  5/25/2025 1030 by Lucie Sequeira RN  Outcome: Progressing  Goal: Increase self care and/or family involovement by end of shift  5/25/2025 1033 by Lucei Sequeira, RN  Outcome: Progressing  5/25/2025 1030 by  Lucie Sequeira, RN  Outcome: Progressing  Goal: Receive DSME education by end of shift  5/25/2025 1033 by Lucie Sequeira RN  Outcome: Progressing  5/25/2025 1030 by Lucie Sequeira RN  Outcome: Progressing     Problem: Skin  Goal: Decreased wound size/increased tissue granulation at next dressing change  Outcome: Progressing  Goal: Participates in plan/prevention/treatment measures  Outcome: Progressing  Goal: Prevent/manage excess moisture  Outcome: Progressing  Goal: Prevent/minimize sheer/friction injuries  Outcome: Progressing  Goal: Promote/optimize nutrition  Outcome: Progressing  Goal: Promote skin healing  Outcome: Progressing

## 2025-05-25 NOTE — NURSING NOTE
Pt iv infiltrated. Pt has no iv access to receive blood transfusion. MD notified. Awaiting midline placement.

## 2025-05-25 NOTE — CARE PLAN
The patient's goals for the shift include relax    The clinical goals for the shift include pt will remain HDS during the night    Pt met goals and had an uneventful night

## 2025-05-25 NOTE — PROGRESS NOTES
Assessment/Plan   marbella Squires is a 59 y.o. 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 Imaging concerning for gas formation. Started on vanc/zosyn/clinda, underwent L 4th and 5th metatarsal amputation and washout  on 5/25, pending cultures. Post-operatively with some bleeding and anemia requiring reinforcement of wound dressing and transfusion.      #Osteomyelitis of L foot  #Diabetic gas forming infection of left foot   -vanc/zosyn/clinda. Will discuss with podiatry for intra-op findings, consider stopping clinda after today.   -underwent L 4th and 5th metatarsal amputation and washout  on 5/25  - will need to discuss extent of source control and await cultures to determine abx course.   -blood cultures 5/24  -Podiatry consulted, appreciate recs  - will likely need ID consult after further information on culutres  - dvt px to resume when blood counts stable and bleeding controlled.   - IS post-op, currently on 2L 100% post-op, do not suspect o2 need, weaning, monitoring return of bowel and bladder fucntion,   - pt/ot, mobilize pt as tolerated, NWB to LLE.      Acute on chronic anemia  - 2/2 to operative bleeding  - hb 6.7, active bleeding, reinforced dressing, ordered 1 unit prbc.       #HFpEF  #HTN  - compensated  - continue carvedilol  - hold imdur, hydralazine, torsemide     #T2DM  -SSI while inpatient, carb controlled diet   -hypoglycemia protocol         Scheduled outpatient appointments in system:   Future Appointments   Date Time Provider Department Center   5/28/2025 To Be Determined Enzo Kemp, PT [facility]   5/29/2025 10:00 AM Barrera Rodriguez MD [id number] [facility]   5/30/2025  3:45 PM Ele Zelaya DPM [id number] [place]   6/4/2025 To Be Determined Enzo Kemp, PT [facility]   6/11/2025 To Be Determined Enzo Kemp, PT [facility]   7/10/2025  3:00 PM [facility] [id number]   7/21/2025  1:15 PM Josue Stanton MD DOWValURO None   8/25/2025 10:30 AM Mone Aquino MD [id number] [facility]   5/15/2026 11:20 AM Marylin Sparks MD [facility] [id number] [facility]     ---------------------------------------------------------------------------------------------------  Subjective   Patient seen postoperatively, arrived to the floor with bleeding, nursing reinforced dressing.  Discussed with patient anemia and transfusion.  Reports feeling well, denies pain, we discussed the operation per the operative notes, discussed antibiotics and potential plans of care.  Discussed postoperative issues including nausea, constipation and urinary retention, pain, patient to use I-S.  Overall she denies pain shortness of breath dizziness nausea, does feel tired.  No weakness or numbness.  pt does not does not feel dizzy when she sits up  ---------------------------------------------------------------------------------------------------  Objective   Last Recorded Vitals  Blood pressure 151/70, pulse 63, temperature 36.5 °C (97.7 °F), resp. rate 19, height 1.575 m (5' 2\"), weight 102 kg (225 lb), SpO2 94%.  Intake/Output last 3 Shifts:  I/O last 3 completed shifts:  In: 300 (2.9 mL/kg) [IV Piggyback:300]  Out: 250 (2.4 mL/kg) [Urine:250 (0.1 mL/kg/hr)]  Weight: 102.1 kg     Physical Exam  Vitals and nursing note reviewed.   Constitutional:       General: She is not in acute distress.     Appearance: Normal appearance. She is not ill-appearing.      Comments: On nasal canula, calm, cooperative, no distress, blood noted on dressing, pillow under foot and bed sheet, stained pinkish   HENT:      Head: Normocephalic and atraumatic.      Mouth/Throat:      Mouth: Mucous membranes are moist.   Eyes:      General: No scleral icterus.     Extraocular Movements: Extraocular movements intact.      Conjunctiva/sclera: Conjunctivae normal.   Cardiovascular:      Rate and Rhythm: Normal rate and " regular rhythm.      Heart sounds: S1 normal and S2 normal. No murmur heard.  Pulmonary:      Effort: Pulmonary effort is normal. No respiratory distress.      Breath sounds: No wheezing, rhonchi or rales.   Abdominal:      General: Bowel sounds are normal. There is no distension.      Palpations: Abdomen is soft.      Tenderness: There is no abdominal tenderness. There is no guarding or rebound.   Musculoskeletal:         General: No deformity.      Cervical back: Neck supple.      Left lower leg: Edema present.      Comments: Left foot wrapped to ankle with dressing/acewrap   Skin:     General: Skin is warm and dry.      Findings: No rash.   Neurological:      General: No focal deficit present.      Mental Status: She is alert and oriented to person, place, and time. Mental status is at baseline.   Psychiatric:         Mood and Affect: Mood normal.         Behavior: Behavior normal.         Relevant Results  Lab Results   Component Value Date    WBC 4.9 05/25/2025    HGB 6.7 (L) 05/25/2025    HCT 22.2 (L) 05/25/2025    MCV 99 05/25/2025     05/25/2025      Lab Results   Component Value Date    GLUCOSE 265 (H) 05/24/2025    CALCIUM 8.5 (L) 05/24/2025     05/24/2025    K 4.4 05/24/2025    CO2 23 05/24/2025     05/24/2025    BUN 29 (H) 05/24/2025    CREATININE 2.46 (H) 05/24/2025     Scheduled medications  Scheduled Medications[1]  Continuous medications  Continuous Medications[2]  PRN medications  PRN Medications[3]    Carlo Guzman MD           [1] atorvastatin, 40 mg, oral, Nightly  carvedilol, 12.5 mg, oral, BID  clindamycin, 600 mg, intravenous, q8h  [Held by provider] enoxaparin, 30 mg, subcutaneous, q24h  gabapentin, 300 mg, oral, BID  [Held by provider] hydrALAZINE, 50 mg, oral, TID  insulin lispro, 0-5 Units, subcutaneous, TID AC  [Held by provider] isosorbide mononitrate ER, 30 mg, oral, Daily  piperacillin-tazobactam, 2.25 g, intravenous, q6h  polyethylene glycol, 17 g, oral,  Daily  [Held by provider] torsemide, 40 mg, oral, Daily  vancomycin, 1,000 mg, intravenous, q24h  vitamin B complex-vitamin C-folic acid, 1 capsule, oral, Daily  [2]    [3] PRN medications: dextrose, dextrose, glucagon, glucagon, oxyCODONE, vancomycin

## 2025-05-25 NOTE — SIGNIFICANT EVENT
S/P Left 4th and 5th metatarsal resection with debridement with Dr. Ramos  Restart all medications and diet.  Nursing okay to reinforce if bleeding  Cultures Obtained  Podiatry to change dressings within the next 24-   Elevate Left  Foot/Leg when at rest  NWB left foot   Please dispense post op shoe to the left foot  Pain management per Primary  Case discussed with Attending     Barrington Llanos DPM PGY3

## 2025-05-25 NOTE — POST-PROCEDURE NOTE
Pre-Procedure Checklist:  Emergent Line Insertion: No  Type of Line to be Placed: Midline PowerGlide  Consent Obtained: Yes  Emergency Medication Necessary: No  Patient Identified with 2 Independent Identifiers: Yes  Review of Allergies, Anticoagulation, Relevant Labs, ECG/Telemetry: Yes  Risks/Benefits/Alternatives Discussed with Patient/POA/Legal Representative: Yes  Stop Sign on Door: Yes  Time Out Performed: Yes  Catheter Exchange: No    Positioning Checklist:  All People, Including Patient, in the Room with Cap and Mask: Yes  Fluoroscopy Used to Identify Vessel and Guide Insertion: No   Sterile Cover Used: Yes  Full Barrier Precautions Followed (Mask, Cap, Gown, Gloves): Yes  Hands Washed: Yes  Monitors Attached with Sound Alarms On: No  Full Body Sterile Drape (Head-to-Toe) Used to Cover Patient: Yes  Trendelenburg Position (For IJ and Subclavian): No  CHG Skin Prep Used and Allowed to Air Dry to Skin Procedure: Yes    Procedure Checklist:  Blood Aspirated From All Lumens, All Ports Subsequently Flushed: Yes  Catheter Caps Placed on All Lumens; Lumens Clamped: Yes  Maintain Guidewire Control Throughout, Ensuring Guidewire Removal: Yes  Maintain Sterile Field Throughout Insertion: Yes  Catheter Secured: Yes  Confirmatory Test of Venous Placement: Non-Pulsatile Blood    Post Procedure Checklist:  Date and Time Written on Dressing: Yes  Sharp and Wire Count and Safe Disposal of all Sharps/Wires: Yes  Sterile Dressing Applied Per Protocol: Yes  X-ray Ordered or ECG Image: N/A    PICC Insertion Details:  Size (Fr): 20g  Lumen Type: single lumen Powerglide  Catheter to Vein Ratio Less Than 50%: Yes  Total Length (cm): 10  External Length (cm): 0   Orientation: left upper arm  Location: cephalic vein  Site Prep: Chlorohexidine; Usual sterile procedure followed  Local Anesthetic: Injectable/Subcutaneous  Indication: IV blood products  Insertion Team Members in the Room: Nurse, LPN  Initial Extremity Circumference  (cm): 44  Insertion Attempts: 2  Patient Tolerance: Tolerated Well, Age Appropriate  Comfort Measures: Subcutaneous anesthetic; Verbal  Procedure Location: Bedside  Safety Measures: Patient specific safety measures addressed with RN  Estimated Blood Loss (mL): 0.5   Vessel Fully Compressible Proximally and Distally to Insertion Site: Yes  Brisk Blood Return Obtained and Line Draws Easily: Yes  Tip Location: axillary vein  Line Confirmation: non-pulsatile blood return  Lot #: [id number]  : BD  PICC Line Exp Date: 04/30/2026  Securement: Stat Lock  Post Procedure Checklist: Handoff with RN; Obtain all new IV tubing prior to use; Bed at lowest level and wheels locked; Line discharge information at bedside.  Additional Details: Line was inserted using Modified Seldinger's Technique.   Placed by: Suzanne Brown RN

## 2025-05-25 NOTE — ANESTHESIA PROCEDURE NOTES
Airway  Date/Time: 5/25/2025 8:08 AM  Reason: elective    Airway not difficult    Staffing  Performed: CRNA   Authorized by: Iman Collins MD    Performed by: RUBA Sheppard  Patient location during procedure: OR    Patient Condition  Indications for airway management: anesthesia  Patient position: sniffing  MILS maintained throughout  No planned trial extubation  Sedation level: deep     Final Airway Details   Preoxygenated: yes  Final airway type: supraglottic airway  Successful airway: classic  Size: 4   Ventilation between attempts: BVM  Number of attempts at approach: 1  Number of other approaches attempted: 0

## 2025-05-26 PROBLEM — Z79.4: Status: ACTIVE | Noted: 2025-05-24

## 2025-05-26 PROBLEM — E09.52: Status: ACTIVE | Noted: 2025-05-24

## 2025-05-26 PROBLEM — E08.52: Status: ACTIVE | Noted: 2025-05-24

## 2025-05-26 LAB
ALBUMIN SERPL BCP-MCNC: 2.5 G/DL (ref 3.4–5)
ANION GAP SERPL CALC-SCNC: 12 MMOL/L (ref 10–20)
BLOOD EXPIRATION DATE: NORMAL
BUN SERPL-MCNC: 34 MG/DL (ref 6–23)
CALCIUM SERPL-MCNC: 7.6 MG/DL (ref 8.6–10.6)
CHLORIDE SERPL-SCNC: 106 MMOL/L (ref 98–107)
CO2 SERPL-SCNC: 24 MMOL/L (ref 21–32)
CREAT SERPL-MCNC: 3.6 MG/DL (ref 0.5–1.05)
DISPENSE STATUS: NORMAL
EGFRCR SERPLBLD CKD-EPI 2021: 14 ML/MIN/1.73M*2
ERYTHROCYTE [DISTWIDTH] IN BLOOD BY AUTOMATED COUNT: 13.8 % (ref 11.5–14.5)
ERYTHROCYTE [DISTWIDTH] IN BLOOD BY AUTOMATED COUNT: 14.6 % (ref 11.5–14.5)
GLUCOSE BLD MANUAL STRIP-MCNC: 188 MG/DL (ref 74–99)
GLUCOSE BLD MANUAL STRIP-MCNC: 216 MG/DL (ref 74–99)
GLUCOSE BLD MANUAL STRIP-MCNC: 230 MG/DL (ref 74–99)
GLUCOSE BLD MANUAL STRIP-MCNC: 236 MG/DL (ref 74–99)
GLUCOSE SERPL-MCNC: 166 MG/DL (ref 74–99)
HCT VFR BLD AUTO: 19.7 % (ref 36–46)
HCT VFR BLD AUTO: 22.6 % (ref 36–46)
HGB BLD-MCNC: 6.1 G/DL (ref 12–16)
HGB BLD-MCNC: 6.3 G/DL (ref 12–16)
HGB BLD-MCNC: 6.9 G/DL (ref 12–16)
MCH RBC QN AUTO: 29.4 PG (ref 26–34)
MCH RBC QN AUTO: 30 PG (ref 26–34)
MCHC RBC AUTO-ENTMCNC: 30.5 G/DL (ref 32–36)
MCHC RBC AUTO-ENTMCNC: 31 G/DL (ref 32–36)
MCV RBC AUTO: 96 FL (ref 80–100)
MCV RBC AUTO: 97 FL (ref 80–100)
NRBC BLD-RTO: 0 /100 WBCS (ref 0–0)
NRBC BLD-RTO: 0 /100 WBCS (ref 0–0)
PHOSPHATE SERPL-MCNC: 5.1 MG/DL (ref 2.5–4.9)
PLATELET # BLD AUTO: 247 X10*3/UL (ref 150–450)
PLATELET # BLD AUTO: 253 X10*3/UL (ref 150–450)
POTASSIUM SERPL-SCNC: 4.2 MMOL/L (ref 3.5–5.3)
PRODUCT BLOOD TYPE: 6200
PRODUCT CODE: NORMAL
RBC # BLD AUTO: 2.03 X10*6/UL (ref 4–5.2)
RBC # BLD AUTO: 2.35 X10*6/UL (ref 4–5.2)
SODIUM SERPL-SCNC: 138 MMOL/L (ref 136–145)
UNIT ABO: NORMAL
UNIT NUMBER: NORMAL
UNIT RH: NORMAL
UNIT VOLUME: 350
WBC # BLD AUTO: 9.1 X10*3/UL (ref 4.4–11.3)
WBC # BLD AUTO: 9.1 X10*3/UL (ref 4.4–11.3)
XM INTEP: NORMAL

## 2025-05-26 PROCEDURE — 2500000004 HC RX 250 GENERAL PHARMACY W/ HCPCS (ALT 636 FOR OP/ED): Mod: JZ | Performed by: STUDENT IN AN ORGANIZED HEALTH CARE EDUCATION/TRAINING PROGRAM

## 2025-05-26 PROCEDURE — 80069 RENAL FUNCTION PANEL: CPT | Performed by: STUDENT IN AN ORGANIZED HEALTH CARE EDUCATION/TRAINING PROGRAM

## 2025-05-26 PROCEDURE — 99232 SBSQ HOSP IP/OBS MODERATE 35: CPT | Performed by: INTERNAL MEDICINE

## 2025-05-26 PROCEDURE — 36430 TRANSFUSION BLD/BLD COMPNT: CPT

## 2025-05-26 PROCEDURE — 2500000001 HC RX 250 WO HCPCS SELF ADMINISTERED DRUGS (ALT 637 FOR MEDICARE OP)

## 2025-05-26 PROCEDURE — 2500000004 HC RX 250 GENERAL PHARMACY W/ HCPCS (ALT 636 FOR OP/ED): Mod: JZ

## 2025-05-26 PROCEDURE — 85018 HEMOGLOBIN: CPT | Performed by: STUDENT IN AN ORGANIZED HEALTH CARE EDUCATION/TRAINING PROGRAM

## 2025-05-26 PROCEDURE — 1100000001 HC PRIVATE ROOM DAILY

## 2025-05-26 PROCEDURE — 85027 COMPLETE CBC AUTOMATED: CPT | Performed by: STUDENT IN AN ORGANIZED HEALTH CARE EDUCATION/TRAINING PROGRAM

## 2025-05-26 PROCEDURE — 82947 ASSAY GLUCOSE BLOOD QUANT: CPT

## 2025-05-26 PROCEDURE — 2500000002 HC RX 250 W HCPCS SELF ADMINISTERED DRUGS (ALT 637 FOR MEDICARE OP, ALT 636 FOR OP/ED)

## 2025-05-26 PROCEDURE — P9016 RBC LEUKOCYTES REDUCED: HCPCS

## 2025-05-26 RX ADMIN — INSULIN LISPRO 2 UNITS: 100 INJECTION, SOLUTION INTRAVENOUS; SUBCUTANEOUS at 12:52

## 2025-05-26 RX ADMIN — INSULIN LISPRO 2 UNITS: 100 INJECTION, SOLUTION INTRAVENOUS; SUBCUTANEOUS at 08:07

## 2025-05-26 RX ADMIN — PIPERACILLIN SODIUM AND TAZOBACTAM SODIUM 2.25 G: 2; .25 INJECTION, SOLUTION INTRAVENOUS at 09:53

## 2025-05-26 RX ADMIN — PIPERACILLIN SODIUM AND TAZOBACTAM SODIUM 2.25 G: 2; .25 INJECTION, SOLUTION INTRAVENOUS at 00:29

## 2025-05-26 RX ADMIN — ASCORBIC ACID, THIAMINE MONONITRATE,RIBOFLAVIN, NIACINAMIDE, PYRIDOXINE HYDROCHLORIDE, FOLIC ACID, CYANOCOBALAMIN, BIOTIN, CALCIUM PANTOTHENATE, 1 CAPSULE: 100; 1.5; 1.7; 20; 10; 1; 6000; 150000; 5 CAPSULE, LIQUID FILLED ORAL at 08:06

## 2025-05-26 RX ADMIN — GABAPENTIN 300 MG: 300 CAPSULE ORAL at 08:06

## 2025-05-26 RX ADMIN — CLINDAMYCIN IN 5 PERCENT DEXTROSE 600 MG: 12 INJECTION, SOLUTION INTRAVENOUS at 08:06

## 2025-05-26 RX ADMIN — VANCOMYCIN HYDROCHLORIDE 1000 MG: 1 INJECTION, SOLUTION INTRAVENOUS at 20:42

## 2025-05-26 RX ADMIN — ATORVASTATIN CALCIUM 40 MG: 40 TABLET, FILM COATED ORAL at 20:42

## 2025-05-26 RX ADMIN — PIPERACILLIN SODIUM AND TAZOBACTAM SODIUM 2.25 G: 2; .25 INJECTION, SOLUTION INTRAVENOUS at 22:17

## 2025-05-26 RX ADMIN — PIPERACILLIN SODIUM AND TAZOBACTAM SODIUM 2.25 G: 2; .25 INJECTION, SOLUTION INTRAVENOUS at 16:35

## 2025-05-26 RX ADMIN — CARVEDILOL 12.5 MG: 12.5 TABLET, FILM COATED ORAL at 08:06

## 2025-05-26 RX ADMIN — CARVEDILOL 12.5 MG: 12.5 TABLET, FILM COATED ORAL at 20:42

## 2025-05-26 RX ADMIN — INSULIN LISPRO 1 UNITS: 100 INJECTION, SOLUTION INTRAVENOUS; SUBCUTANEOUS at 16:37

## 2025-05-26 RX ADMIN — GABAPENTIN 300 MG: 300 CAPSULE ORAL at 20:42

## 2025-05-26 ASSESSMENT — COGNITIVE AND FUNCTIONAL STATUS - GENERAL
CLIMB 3 TO 5 STEPS WITH RAILING: A LITTLE
MOBILITY SCORE: 21
TOILETING: A LITTLE
MOBILITY SCORE: 20
WALKING IN HOSPITAL ROOM: A LITTLE
WALKING IN HOSPITAL ROOM: A LITTLE
MOVING TO AND FROM BED TO CHAIR: A LITTLE
MOVING TO AND FROM BED TO CHAIR: A LITTLE
DAILY ACTIVITIY SCORE: 23
CLIMB 3 TO 5 STEPS WITH RAILING: A LOT
DAILY ACTIVITIY SCORE: 23
TOILETING: A LITTLE

## 2025-05-26 ASSESSMENT — PAIN SCALES - GENERAL
PAINLEVEL_OUTOF10: 0 - NO PAIN
PAINLEVEL_OUTOF10: 0 - NO PAIN

## 2025-05-26 NOTE — PROGRESS NOTES
Nuris Squires is a 59 y.o. female on day 2 of admission presenting with Left foot infection.      Subjective   Pt was seen and examined at bedside this morning, states that she tolerating her pain and denied fever, chills, N/V, chest pain or shortness of breath.   She is s/p podiatry intervention in OR with amputation of 4th and 5th digits of the L foot.    Objective     Last Recorded Vitals  /65   Pulse 74   Temp 36.6 °C (97.9 °F) (Temporal)   Resp 16   Wt 102 kg (225 lb)   SpO2 96%   Intake/Output last 3 Shifts:    Intake/Output Summary (Last 24 hours) at 5/26/2025 1417  Last data filed at 5/26/2025 1242  Gross per 24 hour   Intake 1033.33 ml   Output --   Net 1033.33 ml       Admission Weight  Weight: 102 kg (225 lb) (05/24/25 1307)    Daily Weight  05/24/25 : 102 kg (225 lb)    Image Results  Bedside Midline Imaging  These images are not reportable by radiology and will not be interpreted   by  Radiologists.  MR foot left w and wo IV contrast  Narrative: Interpreted By:  Randal Garcia and Hanreck James   STUDY:  MRI of the left forefoot without and with IV contrast;      INDICATION:  Signs/Symptoms:Osteo L foot          COMPARISON:  Same day foot CT      ACCESSION NUMBER(S):  [id number]      ORDERING CLINICIAN:  NGA CASTILLO      TECHNIQUE:  Multiplanar multisequence MRI of the  left forefoot was performed  without and with IV contrast      FINDINGS:  Postsurgical changes from prior 1st digit interphalangeal and 2nd  digit MTP joint amputations are seen.      Soft tissues: There is a plantar soft tissue ulcer at the level of  the  4th metatarsophalangeal joint with associated subjacent soft  tissue edema and enhancement, suggestive of cellulitis. Questionable  fluid collection versus confluent edema is seen in the plantar soft  tissues at the level of the 3rd mid metatarsal measuring 1.0 x 0.4 x  1.0 cm.      Bones:  There is STIR hyperintensity and loss of normal T1 marrow  signal  involving approximate of the distal half of the 4th metatarsal  and the majority of the 4th proximal phalanx with associated osseous  irregularity at the joint. There is abnormal marrow signal the 4th  middle phalanx on STIR images. Additionally there is STIR  hyperintensity of the 5th proximal and middle phalanges with subtle  loss of normal T1 marrow signal (series 8, image 21). There is marrow  edema of the 5th metatarsal head. Mild patchy marrow edema involving  the base of the 3rd proximal phalanx. Likely pathologic fracture of  the 4th metatarsal neck.      Muscles:  Edema and atrophy of the plantar foot musculature,  compatible with diabetic myopathy and/or myositis.      Miscellaneous:  Visualized tendons and Lisfranc ligament are grossly  intact.      Impression: 1. Osteomyelitis of the 4th metatarsal, 4th proximal phalanx, and  high likelihood of osteomyelitis of the 4th middle phalanx, as above.  Pathologic fracture of 4th metatarsal neck.  2. Osteomyelitis of the 5th proximal and middle phalanges, as above.  High likelihood of osteolysis of the 5th metatarsal head.  3. High likelihood of osteomyelitis 3rd proximal phalangeal base.  4. Multifocal soft tissue ulceration, cellulitis, and likely small  subcutaneous abscess, as above.  5. Postsurgical changes from prior 1st digit interphalangeal and 2nd  digit MTP joint amputations          I personally reviewed the images/study and I agree with the resident  Rubio Toledo's findings as stated. This study was interpreted  at [facility], [place], [place].      MACRO:  None      Signed by: Randal Garcia 5/25/2025 7:44 AM  Dictation workstation:   [id number]      Physical Exam  Constitutional: Pleasant elderly female, alert active, cooperative not in acute distress  Eyes: PERRLA, clear sclera  ENMT: Moist mucosal membranes, no exudate  Head / Neck: Atraumatic, normocephalic, supple neck, JVP not visualized  Lungs: Patent  airways, CTABL  Heart: RRR, S1S2, no murmurs appreciated, palpable pulses in all extremities  GI: Soft, NT, ND, bowel sounds present in all quadrants  MSK: Moves all extremities freely, no restriction  of ROM, no joint edema  Extremities: Left foot covered with dressing dry and intact,  Midline insertion in the right arm, no peripheral edema  : No Louis catheter inserted  Breast: Deferred  Neurological: AAO x 3 to person, place and date, facial muscles symmetrical, sensation intact, strength 4/4, no acute focal neurological deficits appreciated  Psychological: Appropriate mood and behavior    Relevant Results                Scheduled medications  Scheduled Medications[1]  Continuous medications  Continuous Medications[2]  PRN medications  PRN Medications[3]    Assessment & Plan  Left foot infection    Chronic ulcer of left foot with fat layer exposed (Multi)      59 y.o. 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 Imaging concerning for gas formation. Started on vanc/zosyn/clinda, underwent L 4th and 5th metatarsal amputation and washout  on 5/25, pending cultures. Post-operatively with some bleeding and anemia requiring reinforcement of wound dressing and transfusion.       Osteomyelitis of L foot  Diabetic gas forming infection of left foot   -Status post podiatry intervention with  L 4th and 5th metatarsal amputation and washout  on 5/25  -Wound cultures pending   -Clindamycin discontinued postoperatively  -Zosyn 2.25 g IV piggyback every 6 hours, vancomycin 1000 mg IV piggyback daily  -blood cultures 5/24  -Podiatry consulted: s/p left foot 4th and 5th digit amputation, scheduled for Left foot:  Delayed Primary Closure, Bone cortex Excision, Reverse Sural Flap for tomorrow, 5/27/25.   - will likely need ID consult after further information on culutres  - dvt px to resume when blood counts stable  and bleeding controlled.   - IS post-op, currently on 2L 100% post-op, do not suspect o2 need, weaning, monitoring return of bowel and bladder fucntion,   - pt/ot, mobilize pt as tolerated, NWB to LLE.      Acute on chronic anemia  - 2/2 to operative bleeding  - hb 6.7, active bleeding, reinforced dressing, ordered 1 unit prbc.   -AM labs with hemoglobin of 6.9, 1 pack RBC ordered for transfusion  -AM lab to reassess     HFpEF/HTN  - compensated  - continue carvedilol  - hold imdur, hydralazine, torsemide     T2DM  -SSI while inpatient, carb controlled diet   -hypoglycemia protocol      Diet  - Diabetic    DVT prophylaxis  - Omission of heparin anticoagulation due to acute on chronic anemia in the setting of surgical procedure.  Continue holding Lovenox 30 mg subcu daily    Disposition: Presented with osteomyelitis of the left foot, need further management, final recommendation pending to see culture result and possible ID consult for outpatient IV antibiotics.    ADOD in 2 days      Adrian Mahajan,            [1] atorvastatin, 40 mg, oral, Nightly  carvedilol, 12.5 mg, oral, BID  clindamycin, 600 mg, intravenous, q8h  [Held by provider] enoxaparin, 30 mg, subcutaneous, q24h  gabapentin, 300 mg, oral, BID  [Held by provider] hydrALAZINE, 50 mg, oral, TID  insulin lispro, 0-5 Units, subcutaneous, TID AC  [Held by provider] isosorbide mononitrate ER, 30 mg, oral, Daily  lidocaine, 5 mL, infiltration, Once  piperacillin-tazobactam, 2.25 g, intravenous, q6h  polyethylene glycol, 17 g, oral, Daily  [Held by provider] torsemide, 40 mg, oral, Daily  vancomycin, 1,000 mg, intravenous, q24h  vitamin B complex-vitamin C-folic acid, 1 capsule, oral, Daily  [2]    [3] PRN medications: dextrose, dextrose, glucagon, glucagon, oxyCODONE, vancomycin

## 2025-05-26 NOTE — CARE PLAN
The patient's goals for the shift include relax    The clinical goals for the shift include Pt to remain HD stable      Problem: Diabetes  Goal: Achieve decreasing blood glucose levels by end of shift  Outcome: Progressing  Goal: Increase stability of blood glucose readings by end of shift  Outcome: Progressing  Goal: Decrease in ketones present in urine by end of shift  Outcome: Progressing  Goal: Maintain electrolyte levels within acceptable range throughout shift  Outcome: Progressing  Goal: Maintain glucose levels >70mg/dl to <250mg/dl throughout shift  Outcome: Progressing  Goal: No changes in neurological exam by end of shift  Outcome: Progressing  Goal: Learn about and adhere to nutrition recommendations by end of shift  Outcome: Progressing  Goal: Vital signs within normal range for age by end of shift  Outcome: Progressing  Goal: Increase self care and/or family involovement by end of shift  Outcome: Progressing  Goal: Receive DSME education by end of shift  Outcome: Progressing     Problem: Skin  Goal: Decreased wound size/increased tissue granulation at next dressing change  Outcome: Progressing  Goal: Participates in plan/prevention/treatment measures  Outcome: Progressing  Goal: Prevent/manage excess moisture  Outcome: Progressing  Goal: Prevent/minimize sheer/friction injuries  Outcome: Progressing  Goal: Promote/optimize nutrition  Outcome: Progressing  Goal: Promote skin healing  Outcome: Progressing

## 2025-05-26 NOTE — CARE PLAN
The patient's goals for the shift include Patient's Hgb will be above 7.    The clinical goals for the shift include Patient will remain HD throughout shift    Problem: Pain - Adult  Goal: Verbalizes/displays adequate comfort level or baseline comfort level  Outcome: Progressing     Problem: Safety - Adult  Goal: Free from fall injury  Outcome: Progressing     Problem: Discharge Planning  Goal: Discharge to home or other facility with appropriate resources  Outcome: Progressing     Problem: Chronic Conditions and Co-morbidities  Goal: Patient's chronic conditions and co-morbidity symptoms are monitored and maintained or improved  Outcome: Progressing     Problem: Nutrition  Goal: Nutrient intake appropriate for maintaining nutritional needs  Outcome: Progressing     Problem: Diabetes  Goal: Achieve decreasing blood glucose levels by end of shift  Outcome: Progressing  Goal: Increase stability of blood glucose readings by end of shift  Outcome: Progressing  Goal: Decrease in ketones present in urine by end of shift  Outcome: Progressing  Goal: Maintain electrolyte levels within acceptable range throughout shift  Outcome: Progressing  Goal: Maintain glucose levels >70mg/dl to <250mg/dl throughout shift  Outcome: Progressing  Goal: No changes in neurological exam by end of shift  Outcome: Progressing  Goal: Learn about and adhere to nutrition recommendations by end of shift  Outcome: Progressing  Goal: Vital signs within normal range for age by end of shift  Outcome: Progressing  Goal: Increase self care and/or family involovement by end of shift  Outcome: Progressing  Goal: Receive DSME education by end of shift  Outcome: Progressing     Problem: Skin  Goal: Decreased wound size/increased tissue granulation at next dressing change  Outcome: Progressing  Goal: Participates in plan/prevention/treatment measures  Outcome: Progressing  Goal: Prevent/manage excess moisture  Outcome: Progressing  Goal: Prevent/minimize  sheer/friction injuries  Outcome: Progressing  Goal: Promote/optimize nutrition  Outcome: Progressing  Goal: Promote skin healing  Outcome: Progressing

## 2025-05-27 ENCOUNTER — ANESTHESIA (OUTPATIENT)
Dept: OPERATING ROOM | Facility: HOSPITAL | Age: 60
End: 2025-05-27
Payer: COMMERCIAL

## 2025-05-27 ENCOUNTER — ANESTHESIA EVENT (OUTPATIENT)
Dept: OPERATING ROOM | Facility: HOSPITAL | Age: 60
End: 2025-05-27
Payer: COMMERCIAL

## 2025-05-27 LAB
ALBUMIN SERPL BCP-MCNC: 2.5 G/DL (ref 3.4–5)
ANION GAP SERPL CALC-SCNC: 12 MMOL/L (ref 10–20)
BLOOD EXPIRATION DATE: NORMAL
BLOOD EXPIRATION DATE: NORMAL
BUN SERPL-MCNC: 40 MG/DL (ref 6–23)
CALCIUM SERPL-MCNC: 7.8 MG/DL (ref 8.6–10.6)
CHLORIDE SERPL-SCNC: 105 MMOL/L (ref 98–107)
CO2 SERPL-SCNC: 23 MMOL/L (ref 21–32)
CREAT SERPL-MCNC: 4.62 MG/DL (ref 0.5–1.05)
DISPENSE STATUS: NORMAL
DISPENSE STATUS: NORMAL
EGFRCR SERPLBLD CKD-EPI 2021: 10 ML/MIN/1.73M*2
ERYTHROCYTE [DISTWIDTH] IN BLOOD BY AUTOMATED COUNT: 14.6 % (ref 11.5–14.5)
GLUCOSE BLD MANUAL STRIP-MCNC: 172 MG/DL (ref 74–99)
GLUCOSE BLD MANUAL STRIP-MCNC: 196 MG/DL (ref 74–99)
GLUCOSE BLD MANUAL STRIP-MCNC: 199 MG/DL (ref 74–99)
GLUCOSE BLD MANUAL STRIP-MCNC: 234 MG/DL (ref 74–99)
GLUCOSE BLD MANUAL STRIP-MCNC: 249 MG/DL (ref 74–99)
GLUCOSE BLD MANUAL STRIP-MCNC: 271 MG/DL (ref 74–99)
GLUCOSE SERPL-MCNC: 242 MG/DL (ref 74–99)
HCT VFR BLD AUTO: 23 % (ref 36–46)
HGB BLD-MCNC: 7.3 G/DL (ref 12–16)
MCH RBC QN AUTO: 30.8 PG (ref 26–34)
MCHC RBC AUTO-ENTMCNC: 31.7 G/DL (ref 32–36)
MCV RBC AUTO: 97 FL (ref 80–100)
NRBC BLD-RTO: 0 /100 WBCS (ref 0–0)
PHOSPHATE SERPL-MCNC: 5.3 MG/DL (ref 2.5–4.9)
PLATELET # BLD AUTO: 243 X10*3/UL (ref 150–450)
POTASSIUM SERPL-SCNC: 4.2 MMOL/L (ref 3.5–5.3)
PRODUCT BLOOD TYPE: 6200
PRODUCT BLOOD TYPE: 6200
PRODUCT CODE: NORMAL
PRODUCT CODE: NORMAL
RBC # BLD AUTO: 2.37 X10*6/UL (ref 4–5.2)
SODIUM SERPL-SCNC: 136 MMOL/L (ref 136–145)
UNIT ABO: NORMAL
UNIT ABO: NORMAL
UNIT NUMBER: NORMAL
UNIT NUMBER: NORMAL
UNIT RH: NORMAL
UNIT RH: NORMAL
UNIT VOLUME: 350
UNIT VOLUME: 350
VANCOMYCIN SERPL-MCNC: 27.2 UG/ML (ref 5–20)
WBC # BLD AUTO: 9.3 X10*3/UL (ref 4.4–11.3)
XM INTEP: NORMAL
XM INTEP: NORMAL

## 2025-05-27 PROCEDURE — 87102 FUNGUS ISOLATION CULTURE: CPT | Performed by: STUDENT IN AN ORGANIZED HEALTH CARE EDUCATION/TRAINING PROGRAM

## 2025-05-27 PROCEDURE — 88305 TISSUE EXAM BY PATHOLOGIST: CPT | Performed by: PATHOLOGY

## 2025-05-27 PROCEDURE — 2500000004 HC RX 250 GENERAL PHARMACY W/ HCPCS (ALT 636 FOR OP/ED): Performed by: ANESTHESIOLOGY

## 2025-05-27 PROCEDURE — 0753T DGTZ GLS MCRSCP SLD LEVEL IV: CPT | Mod: TC,SUR | Performed by: STUDENT IN AN ORGANIZED HEALTH CARE EDUCATION/TRAINING PROGRAM

## 2025-05-27 PROCEDURE — A11044 PR DEBRIDEMENT, SKIN, SUB-Q TISSUE,MUSCLE,BONE,=<20 SQ CM: Performed by: ANESTHESIOLOGY

## 2025-05-27 PROCEDURE — 36430 TRANSFUSION BLD/BLD COMPNT: CPT | Performed by: ANESTHESIOLOGIST ASSISTANT

## 2025-05-27 PROCEDURE — 3600000003 HC OR TIME - INITIAL BASE CHARGE - PROCEDURE LEVEL THREE: Performed by: PODIATRIST

## 2025-05-27 PROCEDURE — 2500000005 HC RX 250 GENERAL PHARMACY W/O HCPCS: Mod: JZ | Performed by: PODIATRIST

## 2025-05-27 PROCEDURE — 80069 RENAL FUNCTION PANEL: CPT | Performed by: STUDENT IN AN ORGANIZED HEALTH CARE EDUCATION/TRAINING PROGRAM

## 2025-05-27 PROCEDURE — A11044 PR DEBRIDEMENT, SKIN, SUB-Q TISSUE,MUSCLE,BONE,=<20 SQ CM: Performed by: ANESTHESIOLOGIST ASSISTANT

## 2025-05-27 PROCEDURE — 7100000001 HC RECOVERY ROOM TIME - INITIAL BASE CHARGE: Performed by: PODIATRIST

## 2025-05-27 PROCEDURE — 0QBP0ZZ EXCISION OF LEFT METATARSAL, OPEN APPROACH: ICD-10-PCS | Performed by: PODIATRIST

## 2025-05-27 PROCEDURE — 82947 ASSAY GLUCOSE BLOOD QUANT: CPT

## 2025-05-27 PROCEDURE — 2500000002 HC RX 250 W HCPCS SELF ADMINISTERED DRUGS (ALT 637 FOR MEDICARE OP, ALT 636 FOR OP/ED)

## 2025-05-27 PROCEDURE — P9016 RBC LEUKOCYTES REDUCED: HCPCS

## 2025-05-27 PROCEDURE — 2500000005 HC RX 250 GENERAL PHARMACY W/O HCPCS: Performed by: ANESTHESIOLOGY

## 2025-05-27 PROCEDURE — 2500000004 HC RX 250 GENERAL PHARMACY W/ HCPCS (ALT 636 FOR OP/ED): Mod: JZ | Performed by: STUDENT IN AN ORGANIZED HEALTH CARE EDUCATION/TRAINING PROGRAM

## 2025-05-27 PROCEDURE — 2500000001 HC RX 250 WO HCPCS SELF ADMINISTERED DRUGS (ALT 637 FOR MEDICARE OP)

## 2025-05-27 PROCEDURE — 2500000004 HC RX 250 GENERAL PHARMACY W/ HCPCS (ALT 636 FOR OP/ED): Mod: JZ | Performed by: ANESTHESIOLOGIST ASSISTANT

## 2025-05-27 PROCEDURE — 88311 DECALCIFY TISSUE: CPT | Performed by: PATHOLOGY

## 2025-05-27 PROCEDURE — 1100000001 HC PRIVATE ROOM DAILY

## 2025-05-27 PROCEDURE — 2720000007 HC OR 272 NO HCPCS: Performed by: PODIATRIST

## 2025-05-27 PROCEDURE — 3700000001 HC GENERAL ANESTHESIA TIME - INITIAL BASE CHARGE: Performed by: PODIATRIST

## 2025-05-27 PROCEDURE — 99222 1ST HOSP IP/OBS MODERATE 55: CPT | Performed by: INTERNAL MEDICINE

## 2025-05-27 PROCEDURE — 7100000002 HC RECOVERY ROOM TIME - EACH INCREMENTAL 1 MINUTE: Performed by: PODIATRIST

## 2025-05-27 PROCEDURE — 87070 CULTURE OTHR SPECIMN AEROBIC: CPT | Performed by: STUDENT IN AN ORGANIZED HEALTH CARE EDUCATION/TRAINING PROGRAM

## 2025-05-27 PROCEDURE — 85027 COMPLETE CBC AUTOMATED: CPT | Performed by: STUDENT IN AN ORGANIZED HEALTH CARE EDUCATION/TRAINING PROGRAM

## 2025-05-27 PROCEDURE — 2500000001 HC RX 250 WO HCPCS SELF ADMINISTERED DRUGS (ALT 637 FOR MEDICARE OP): Performed by: STUDENT IN AN ORGANIZED HEALTH CARE EDUCATION/TRAINING PROGRAM

## 2025-05-27 PROCEDURE — 80202 ASSAY OF VANCOMYCIN: CPT

## 2025-05-27 PROCEDURE — 99232 SBSQ HOSP IP/OBS MODERATE 35: CPT | Performed by: STUDENT IN AN ORGANIZED HEALTH CARE EDUCATION/TRAINING PROGRAM

## 2025-05-27 PROCEDURE — 3600000008 HC OR TIME - EACH INCREMENTAL 1 MINUTE - PROCEDURE LEVEL THREE: Performed by: PODIATRIST

## 2025-05-27 PROCEDURE — 2500000004 HC RX 250 GENERAL PHARMACY W/ HCPCS (ALT 636 FOR OP/ED): Performed by: PODIATRIST

## 2025-05-27 PROCEDURE — 3700000002 HC GENERAL ANESTHESIA TIME - EACH INCREMENTAL 1 MINUTE: Performed by: PODIATRIST

## 2025-05-27 PROCEDURE — 2500000004 HC RX 250 GENERAL PHARMACY W/ HCPCS (ALT 636 FOR OP/ED): Mod: JZ

## 2025-05-27 RX ORDER — LIDOCAINE HYDROCHLORIDE 10 MG/ML
0.1 INJECTION, SOLUTION INFILTRATION; PERINEURAL ONCE
Status: DISCONTINUED | OUTPATIENT
Start: 2025-05-27 | End: 2025-05-27 | Stop reason: HOSPADM

## 2025-05-27 RX ORDER — SODIUM CHLORIDE, SODIUM LACTATE, POTASSIUM CHLORIDE, CALCIUM CHLORIDE 600; 310; 30; 20 MG/100ML; MG/100ML; MG/100ML; MG/100ML
100 INJECTION, SOLUTION INTRAVENOUS CONTINUOUS
Status: DISCONTINUED | OUTPATIENT
Start: 2025-05-27 | End: 2025-05-27 | Stop reason: HOSPADM

## 2025-05-27 RX ORDER — PHENYLEPHRINE HCL IN 0.9% NACL 0.4MG/10ML
SYRINGE (ML) INTRAVENOUS AS NEEDED
Status: DISCONTINUED | OUTPATIENT
Start: 2025-05-27 | End: 2025-05-27

## 2025-05-27 RX ORDER — ACETAMINOPHEN 325 MG/1
650 TABLET ORAL ONCE
Status: DISCONTINUED | OUTPATIENT
Start: 2025-05-27 | End: 2025-05-27 | Stop reason: HOSPADM

## 2025-05-27 RX ORDER — OXYCODONE HYDROCHLORIDE 5 MG/1
5 TABLET ORAL EVERY 4 HOURS PRN
Status: DISCONTINUED | OUTPATIENT
Start: 2025-05-27 | End: 2025-05-27 | Stop reason: HOSPADM

## 2025-05-27 RX ORDER — GLYCOPYRROLATE 0.2 MG/ML
INJECTION INTRAMUSCULAR; INTRAVENOUS AS NEEDED
Status: DISCONTINUED | OUTPATIENT
Start: 2025-05-27 | End: 2025-05-27

## 2025-05-27 RX ORDER — LIDOCAINE HYDROCHLORIDE 20 MG/ML
INJECTION, SOLUTION INFILTRATION; PERINEURAL AS NEEDED
Status: DISCONTINUED | OUTPATIENT
Start: 2025-05-27 | End: 2025-05-27

## 2025-05-27 RX ORDER — PROPOFOL 10 MG/ML
INJECTION, EMULSION INTRAVENOUS AS NEEDED
Status: DISCONTINUED | OUTPATIENT
Start: 2025-05-27 | End: 2025-05-27

## 2025-05-27 RX ORDER — ONDANSETRON HYDROCHLORIDE 2 MG/ML
4 INJECTION, SOLUTION INTRAVENOUS ONCE AS NEEDED
Status: DISCONTINUED | OUTPATIENT
Start: 2025-05-27 | End: 2025-05-27 | Stop reason: HOSPADM

## 2025-05-27 RX ORDER — FENTANYL CITRATE 50 UG/ML
INJECTION, SOLUTION INTRAMUSCULAR; INTRAVENOUS AS NEEDED
Status: DISCONTINUED | OUTPATIENT
Start: 2025-05-27 | End: 2025-05-27

## 2025-05-27 RX ORDER — DROPERIDOL 2.5 MG/ML
0.62 INJECTION, SOLUTION INTRAMUSCULAR; INTRAVENOUS ONCE AS NEEDED
Status: DISCONTINUED | OUTPATIENT
Start: 2025-05-27 | End: 2025-05-27 | Stop reason: HOSPADM

## 2025-05-27 RX ORDER — MIDAZOLAM HYDROCHLORIDE 1 MG/ML
INJECTION INTRAMUSCULAR; INTRAVENOUS AS NEEDED
Status: DISCONTINUED | OUTPATIENT
Start: 2025-05-27 | End: 2025-05-27

## 2025-05-27 RX ORDER — SODIUM CHLORIDE 0.9 G/100ML
INJECTION, SOLUTION IRRIGATION AS NEEDED
Status: DISCONTINUED | OUTPATIENT
Start: 2025-05-27 | End: 2025-05-27 | Stop reason: HOSPADM

## 2025-05-27 RX ORDER — ONDANSETRON HYDROCHLORIDE 2 MG/ML
INJECTION, SOLUTION INTRAVENOUS AS NEEDED
Status: DISCONTINUED | OUTPATIENT
Start: 2025-05-27 | End: 2025-05-27

## 2025-05-27 RX ORDER — MIDAZOLAM HYDROCHLORIDE 1 MG/ML
1 INJECTION INTRAMUSCULAR; INTRAVENOUS ONCE AS NEEDED
Status: DISCONTINUED | OUTPATIENT
Start: 2025-05-27 | End: 2025-05-27 | Stop reason: HOSPADM

## 2025-05-27 RX ORDER — ALBUTEROL SULFATE 0.83 MG/ML
2.5 SOLUTION RESPIRATORY (INHALATION) ONCE AS NEEDED
Status: DISCONTINUED | OUTPATIENT
Start: 2025-05-27 | End: 2025-05-27 | Stop reason: HOSPADM

## 2025-05-27 RX ORDER — HYDROMORPHONE HYDROCHLORIDE 0.2 MG/ML
0.2 INJECTION INTRAMUSCULAR; INTRAVENOUS; SUBCUTANEOUS EVERY 5 MIN PRN
Status: DISCONTINUED | OUTPATIENT
Start: 2025-05-27 | End: 2025-05-27 | Stop reason: HOSPADM

## 2025-05-27 RX ORDER — LABETALOL HYDROCHLORIDE 5 MG/ML
5 INJECTION, SOLUTION INTRAVENOUS ONCE AS NEEDED
Status: DISCONTINUED | OUTPATIENT
Start: 2025-05-27 | End: 2025-05-27 | Stop reason: HOSPADM

## 2025-05-27 RX ORDER — MEPERIDINE HYDROCHLORIDE 25 MG/ML
12.5 INJECTION INTRAMUSCULAR; INTRAVENOUS; SUBCUTANEOUS EVERY 10 MIN PRN
Status: DISCONTINUED | OUTPATIENT
Start: 2025-05-27 | End: 2025-05-27 | Stop reason: HOSPADM

## 2025-05-27 RX ADMIN — FENTANYL CITRATE 25 MCG: 50 INJECTION, SOLUTION INTRAMUSCULAR; INTRAVENOUS at 09:28

## 2025-05-27 RX ADMIN — HYDRALAZINE HYDROCHLORIDE 50 MG: 50 TABLET ORAL at 21:12

## 2025-05-27 RX ADMIN — ATORVASTATIN CALCIUM 40 MG: 40 TABLET, FILM COATED ORAL at 21:12

## 2025-05-27 RX ADMIN — GABAPENTIN 300 MG: 300 CAPSULE ORAL at 21:12

## 2025-05-27 RX ADMIN — Medication 120 MCG: at 08:12

## 2025-05-27 RX ADMIN — PROPOFOL 150 MG: 10 INJECTION, EMULSION INTRAVENOUS at 07:53

## 2025-05-27 RX ADMIN — LIDOCAINE HYDROCHLORIDE 30 MG: 20 INJECTION, SOLUTION INFILTRATION; PERINEURAL at 07:53

## 2025-05-27 RX ADMIN — INSULIN LISPRO 3 UNITS: 100 INJECTION, SOLUTION INTRAVENOUS; SUBCUTANEOUS at 17:55

## 2025-05-27 RX ADMIN — PIPERACILLIN SODIUM AND TAZOBACTAM SODIUM 2.25 G: 2; .25 INJECTION, SOLUTION INTRAVENOUS at 23:18

## 2025-05-27 RX ADMIN — SODIUM CHLORIDE, SODIUM LACTATE, POTASSIUM CHLORIDE, AND CALCIUM CHLORIDE: 600; 310; 30; 20 INJECTION, SOLUTION INTRAVENOUS at 07:42

## 2025-05-27 RX ADMIN — FENTANYL CITRATE 25 MCG: 50 INJECTION, SOLUTION INTRAMUSCULAR; INTRAVENOUS at 08:59

## 2025-05-27 RX ADMIN — ONDANSETRON 4 MG: 2 INJECTION INTRAMUSCULAR; INTRAVENOUS at 08:39

## 2025-05-27 RX ADMIN — MIDAZOLAM HYDROCHLORIDE 1 MG: 2 INJECTION, SOLUTION INTRAMUSCULAR; INTRAVENOUS at 07:50

## 2025-05-27 RX ADMIN — PIPERACILLIN SODIUM AND TAZOBACTAM SODIUM 2.25 G: 2; .25 INJECTION, SOLUTION INTRAVENOUS at 09:53

## 2025-05-27 RX ADMIN — FENTANYL CITRATE 25 MCG: 50 INJECTION, SOLUTION INTRAMUSCULAR; INTRAVENOUS at 07:53

## 2025-05-27 RX ADMIN — Medication 80 MCG: at 08:23

## 2025-05-27 RX ADMIN — Medication 8 L/MIN: at 09:33

## 2025-05-27 RX ADMIN — Medication 2 L/MIN: at 10:00

## 2025-05-27 RX ADMIN — GLYCOPYRROLATE 0.2 MG: 0.2 INJECTION, SOLUTION INTRAMUSCULAR; INTRAVENOUS at 07:50

## 2025-05-27 RX ADMIN — CARVEDILOL 12.5 MG: 12.5 TABLET, FILM COATED ORAL at 21:12

## 2025-05-27 RX ADMIN — INSULIN LISPRO 1 UNITS: 100 INJECTION, SOLUTION INTRAVENOUS; SUBCUTANEOUS at 14:21

## 2025-05-27 RX ADMIN — PIPERACILLIN SODIUM AND TAZOBACTAM SODIUM 2.25 G: 2; .25 INJECTION, SOLUTION INTRAVENOUS at 17:58

## 2025-05-27 RX ADMIN — PIPERACILLIN SODIUM AND TAZOBACTAM SODIUM 2.25 G: 2; .25 INJECTION, SOLUTION INTRAVENOUS at 03:59

## 2025-05-27 SDOH — HEALTH STABILITY: MENTAL HEALTH: CURRENT SMOKER: 0

## 2025-05-27 ASSESSMENT — COGNITIVE AND FUNCTIONAL STATUS - GENERAL
MOBILITY SCORE: 15
DAILY ACTIVITIY SCORE: 15
WALKING IN HOSPITAL ROOM: A LOT
HELP NEEDED FOR BATHING: A LOT
TOILETING: A LOT
EATING MEALS: A LITTLE
DRESSING REGULAR LOWER BODY CLOTHING: A LOT
MOVING TO AND FROM BED TO CHAIR: A LITTLE
STANDING UP FROM CHAIR USING ARMS: A LOT
TURNING FROM BACK TO SIDE WHILE IN FLAT BAD: A LITTLE
PERSONAL GROOMING: A LITTLE
CLIMB 3 TO 5 STEPS WITH RAILING: A LOT
DRESSING REGULAR UPPER BODY CLOTHING: A LITTLE
MOVING FROM LYING ON BACK TO SITTING ON SIDE OF FLAT BED WITH BEDRAILS: A LITTLE

## 2025-05-27 ASSESSMENT — PAIN SCALES - GENERAL
PAINLEVEL_OUTOF10: 0 - NO PAIN

## 2025-05-27 ASSESSMENT — PAIN - FUNCTIONAL ASSESSMENT
PAIN_FUNCTIONAL_ASSESSMENT: 0-10

## 2025-05-27 ASSESSMENT — ACTIVITIES OF DAILY LIVING (ADL): LACK_OF_TRANSPORTATION: NO

## 2025-05-27 NOTE — ANESTHESIA PROCEDURE NOTES
Airway  Date/Time: 5/27/2025 7:53 AM  Reason: elective    Airway not difficult    Staffing  Performed: CAA   Authorized by: DAVEY Rebollar    Performed by: DAVEY Rebollar  Patient location during procedure: OR    Patient Condition  Indications for airway management: anesthesia  Patient position: sniffing  Sedation level: deep     Final Airway Details   Preoxygenated: yes  Final airway type: supraglottic airway  Successful airway: classic  Size: 4  Number of attempts at approach: 1    Additional Comments  Lips and teeth in preanesthetic condition. Silk tape.

## 2025-05-27 NOTE — OP NOTE
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, with long-term current use of insulin [E08.52, Z79.4]     Procedures  DEBRIDEMENT, LOWER EXTREMITY  96874 - WA DEBRIDEMENT BONE 1ST 20 SQ CM/<    TRANSMETATARSAL TMA  53562 - [place] SECONDARY CLOSURE SURG WOUND/DEHSN XTNSV/COMP      Surgeons      * Socrates Ramos - Primary    Resident/Fellow/Other Assistant:  Surgeons and Role:     * Barrington Llanos DPM - Resident - Assisting  Primary: Luis Bledsoe DPM PGY1    Staff:   Heronulator: Odette Hester Person: Natividad    Anesthesia Staff: Anesthesiologist: Enzo Mccarthy MD  C-AA: DAVEY Rebollar    Procedure Summary  Anesthesia: General  ASA: III  Estimated Blood Loss: 100mL  Intra-op Medications:   Administrations occurring from 0700 to 1005 on 25:   Medication Name Total Dose   BUPivacaine HCl (Marcaine) 0.5 % (5 mg/mL) 10 mL, lidocaine (Xylocaine) 10 mL syringe 20 mL   sodium chloride 0.9 % irrigation solution 1,000 mL   atorvastatin (Lipitor) tablet 40 mg Cannot be calculated   carvedilol (Coreg) tablet 12.5 mg Cannot be calculated   dextrose 50 % injection 12.5 g Cannot be calculated   dextrose 50 % injection 25 g Cannot be calculated   fentaNYL (Sublimaze) injection 50 mcg/mL 50 mcg   gabapentin (Neurontin) capsule 300 mg Cannot be calculated   glucagon (Glucagen) injection 1 mg Cannot be calculated   glucagon (Glucagen) injection 1 mg Cannot be calculated   glycopyrrolate  (Robinul) injection 0.2 mg   hydrALAZINE (Apresoline) tablet 50 mg Cannot be calculated   insulin lispro injection 0-5 Units Cannot be calculated   isosorbide mononitrate ER (Imdur) 24 hr tablet 30 mg Cannot be calculated   LR bolus Cannot be calculated   lidocaine (Xylocaine) 10 mg/mL (1 %) injection 5 mL Cannot be calculated   lidocaine (Xylocaine) injection 2 % 30 mg   midazolam PF (Versed) injection 1 mg/mL 1 mg   ondansetron (Zofran) 2 mg/mL injection 4 mg   oxyCODONE (Roxicodone) immediate release tablet 5 mg Cannot be calculated   phenylephrine 40 mcg/mL syringe 10 mL 200 mcg   piperacillin-tazobactam (Zosyn) 2.25 g in dextrose (iso) IV 50 mL Cannot be calculated   polyethylene glycol (Glycolax, Miralax) packet 17 g Cannot be calculated   propofol (Diprivan) injection 10 mg/mL 150 mg   torsemide (Demadex) tablet 40 mg Cannot be calculated   vancomycin (Vancocin) 1,000 mg in dextrose 5%  mL Cannot be calculated   vancomycin (Vancocin) pharmacy to dose - pharmacy monitoring Cannot be calculated   vitamin B complex-vitamin C-folic acid (Nephrocaps) capsule 1 capsule Cannot be calculated              Anesthesia Record               Intraprocedure I/O Totals       None           Specimen:   ID Type Source Tests Collected by Time   1 : metatarsals- left Tissue BONE RESECTION SURGICAL PATHOLOGY EXAM Socrates Ramos DPM 5/27/2025 0832   A : deep soft tissue left foot Swab SOFT TISSUE RESECTION FUNGAL CULTURE/SMEAR, TISSUE/WOUND CULTURE/SMEAR Socrates Ramos DPM 5/27/2025 0835                 Drains and/or Catheters:   External Urinary Catheter Female (Active)   Present on Admission to Healthcare Facility Y 05/25/25 0746   Wall Suction (mmHg) 50 05/25/25 0746   External Catheter Status In place 05/25/25 0746   Securement Method Adhesive device 05/25/25 0746   Output (mL) 300 mL 05/26/25 2209       Tourniquet Times:         Implants:     Findings: Intraoperative findings were the same as preoperative findings  clinically and radiographically.     Indications: Nuris Squires is an 59 y.o. 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     The patient was seen in the preoperative area. The risks, benefits, complications, treatment options, non-operative alternatives, expected recovery and outcomes were discussed with the patient. The possibilities of reaction to medication, pulmonary aspiration, injury to surrounding structures, bleeding, recurrent infection, the need for additional procedures, failure to diagnose a condition, and creating a complication requiring transfusion or operation were discussed with the patient. The patient concurred with the proposed plan, giving informed consent.  The site of surgery was properly noted/marked if necessary per policy. The patient has been actively warmed in preoperative area. Preoperative antibiotics have been ordered and given within 1 hours of incision. Venous thrombosis prophylaxis have been ordered including unilateral sequential compression device    Procedure Details:   The patient with diagnosis as outlined above presents for podiatric surgical intervention today. The patient has attempted and failed conservative treatment as outlined in preoperative clinic notes and wishes to proceed with surgical intervention at this time. The nature of the deformity, problems anticipated procedures, recovery/convalescence, risks/complications including but not limited to numbness, CRPS, over/under correction, problems healing of soft tissue or bone, postoperative wound infection, wound dehiscence, DVT and/or persistent pain/disability have been explained to the patient in detail. An updated H&P and consent have been completed prior to today's surgical intervention. The patient states that they have been NPO since midnight.  No guarantees were given or implied, but it is expected that the patient will have a favorable outcome.  It is with this understanding that we proceed.     PRE-PROCEDURE INFORMATION:  In pre-op holding area, the Left extremity to be operated on was clearly marked and the patient verified correct laterality of the marking.  The patient was brought to the operating room and placed on the operating table in the supine position.  A timeout was performed in which identification of the correct patient, procedure, location, and materials was done. Following IV sedation, local anesthesia was obtained utilizing 20cc of 1:1 mix od 1% lidocaine plain and 0.5% marcane plain. The Left foot was then scrubbed, prepped and draped in the usual aseptic manner.   Procedure Details:   Attention was directed to the Left forefoot  Open TMA site where dissection was deepened at the  level of the 1st, 2nd, 3rd metatarsals such that this incision allowed adequate coverage of the remaining metatarsal bones for closure.  The distal aspects of the 1st, 2nd, 3rd metatarsals were removed with  a sagittal saw was used to perform the osteotomy of the metatarsals in a manner that allowed for the maintenance of a parabola-shape of the metatarsals. It was clear after resection of the bone that the remaining bone left intact was viable and appeared to have no signs of osteomyelitis or other infection. Utilizing a rongeur and other instrumentation, all devitalized soft tissue was removed from the area and a bone rasp was used to smooth the remaining surfaces so as to leave no sharp edges at the end of the bones.   The wound was then debrided using the Medefyx micro debridement system. The a post-lavage culture was taken from the open TMA site passed from the surgical field and was sent to microbiology. The Skin surrounding the open TMA site was then undermined with a #15 blade and a key elevator to assist with closure. Then the Open TMA site was closed  with 3-0 nylon suture in an alternating horizontal mattress as well as surgical staples.     Dressing was placed on the surgical extremity consisting of Betadine soaked adaptic, 4x4's, xeroform to the anterior ankle, Krelix, ACE wrap The patient was placed in a surgical shoe.    POSTOPERATIVE INFORMATION: The patient tolerated the above noted procedure and anesthesia well and was transferred to the PACU with vital signs stable, and vascular status intact with capillary refill intact to the most distal aspect of the operative extremity. Postoperative instructions reviewed in detail with the patient and family with written instructions provided. Patient will return to floor. Should any problems, questions, or concerns arise, primary team to lemuel or [facility] podiatry team.    This is Barrington Llanos DPM dictating the operative note for Dr. Socrates Ramos DPM.    Evidence of Infection: No   Complications:  None; patient tolerated the procedure well.    Disposition: PACU - hemodynamically stable.  Condition: stable                 Additional Details: None    Attending Attestation: I was present and scrubbed for the entire procedure.    Socrates Ramos  Phone Number: [phone]

## 2025-05-27 NOTE — CARE PLAN
The patient's goals for the shift include relax    The clinical goals for the shift include pt will remain stable during the shift      Problem: Pain - Adult  Goal: Verbalizes/displays adequate comfort level or baseline comfort level  5/27/2025 1840 by Basilia Gonzáles RN  Outcome: Progressing  5/27/2025 1839 by Basilia Gonzáles RN  Outcome: Progressing  5/27/2025 1838 by Basilia Gonzáles RN  Outcome: Progressing     Problem: Safety - Adult  Goal: Free from fall injury  5/27/2025 1840 by Basilia Gonzáles RN  Outcome: Progressing  5/27/2025 1839 by Basilia Gonzáles RN  Outcome: Progressing  5/27/2025 1838 by Basilia Gonzáles RN  Outcome: Progressing     Problem: Discharge Planning  Goal: Discharge to home or other facility with appropriate resources  5/27/2025 1840 by Basilia Gonzáles RN  Outcome: Progressing  5/27/2025 1839 by Basilia Gonzáles RN  Outcome: Progressing  5/27/2025 1838 by Basilia Gonzáles RN  Outcome: Progressing     Problem: Chronic Conditions and Co-morbidities  Goal: Patient's chronic conditions and co-morbidity symptoms are monitored and maintained or improved  5/27/2025 1840 by Basilia Gonzáles RN  Outcome: Progressing  5/27/2025 1839 by Basilia Gonzáles RN  Outcome: Progressing  5/27/2025 1838 by Basilia Gonzáles RN  Outcome: Progressing     Problem: Chronic Conditions and Co-morbidities  Goal: Patient's chronic conditions and co-morbidity symptoms are monitored and maintained or improved  5/27/2025 1840 by Basilia Gonzáles RN  Outcome: Progressing  5/27/2025 1839 by Basilia Gonzáles RN  Outcome: Progressing  5/27/2025 1838 by Basilia Gonzáles RN  Outcome: Progressing

## 2025-05-27 NOTE — CONSULTS
"Nutrition Initial Assessment:   Nutrition Assessment    Reason for Assessment: Admission nursing screening - wound    Patient is a 59 y.o. female presenting from wound clinic per Dr. Garcia DPM for osteomyelitis of L foot. Admitted for severe diabetic foot infection, IV antibiotics, and possible surgical management. Debridement, closure, excision, and reversal of sural flap completed 5/27.     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 Pt with an MST of 0, triggered for wounds. Per chart, pt denied n/v/d. Pt with 1 documented meal opf 0%. Per [employer], pt has been ordering 2-3 meals/day, generally well-balanced. Will trial [facility]  Vitamin/Herbal Supplement Use: B-complex, ferrous-sulfate per chart  Food Allergy:  (None per chart)       Anthropometrics:  Height: 157.5 cm (5' 2\")   Weight: 102 kg (225 lb)   BMI (Calculated): 41.14  IBW/kg (Dietitian Calculated): 50 kg  Percent of IBW: 204 %  Adjusted Body Weight (kg): 63 kg    Weight History:   Wt Readings from Last 20 Encounters:   05/24/25 102 kg (225 lb)   05/20/25 103 kg (226 lb 3.2 oz)   05/15/25 102 kg (225 lb)   04/23/25 100 kg (221 lb)   03/10/25 103 kg (227 lb 1.2 oz)   02/20/25 103 kg (227 lb)   02/17/25 108 kg (238 lb)   02/04/25 108 kg (238 lb 9.6 oz)   01/23/25 104 kg (230 lb 2.6 oz)   11/19/24 99.8 kg (220 lb)   11/15/24 99.8 kg (220 lb 0.3 oz)   09/05/24 99.8 kg (220 lb)   09/04/24 102 kg (224 lb)   07/19/24 102 kg (224 lb)   04/15/24 102 kg (225 lb)   02/20/24 102 kg (225 lb)   02/12/24 102 kg (225 lb)   02/02/24 102 kg (225 lb)   01/31/24 107 kg (234 lb 12.8 oz)   01/10/24 102 kg (225 lb)       Weight Change %:  Weight History / % Weight Change: Per chart, pt with 5.6% weight loss x 3 months  Significant Weight Loss: No    Nutrition Focused Physical Exam Findings:  Defer  Edema:  Edema: +1 " trace  Edema Location: LLE  Physical Findings:  Skin: Positive (L foot x 2)  Digestive System Findings:  (None per chart)  Mouth Findings:  (None per chart)    Nutrition Significant Labs:  CBC Trend:   Results from last 7 days   Lab Units 05/27/25  0408 05/26/25  0928 05/26/25  0110 05/25/25  2342 05/25/25  0925   WBC AUTO x10*3/uL 9.3 9.1  --  9.1 4.9   RBC AUTO x10*6/uL 2.37* 2.35*  --  2.03* 2.25*   HEMOGLOBIN g/dL 7.3* 6.9*   < > 6.1* 6.7*   HEMATOCRIT % 23.0* 22.6*  --  19.7* 22.2*   MCV fL 97 96  --  97 99   PLATELETS AUTO x10*3/uL 243 253  --  247 287    < > = values in this interval not displayed.    , BMP Trend:   Results from last 7 days   Lab Units 05/27/25  0408 05/26/25  0928 05/24/25  1327   GLUCOSE mg/dL 242* 166* 265*   CALCIUM mg/dL 7.8* 7.6* 8.5*   SODIUM mmol/L 136 138 136   POTASSIUM mmol/L 4.2 4.2 4.4   CO2 mmol/L 23 24 23   CHLORIDE mmol/L 105 106 104   BUN mg/dL 40* 34* 29*   CREATININE mg/dL 4.62* 3.60* 2.46*    , A1C:  Lab Results   Component Value Date    HGBA1C 7.3 (A) 05/20/2025   , BG POCT trend:   Results from last 7 days   Lab Units 05/27/25  1118 05/27/25  0943 05/27/25  0724 05/27/25  0406 05/26/25 2032   POCT GLUCOSE mg/dL 196* 172* 199* 234* 230*    , Liver Function Trend:   Results from last 7 days   Lab Units 05/24/25  1327   ALK PHOS U/L 70   AST U/L 13   ALT U/L 9   BILIRUBIN TOTAL mg/dL 0.4    , Renal Lab Trend:   Results from last 7 days   Lab Units 05/27/25  0408 05/26/25  0928 05/24/25  1327 05/24/25  1327   POTASSIUM mmol/L 4.2 4.2  --  4.4   PHOSPHORUS mg/dL 5.3* 5.1*   < >  --    SODIUM mmol/L 136 138  --  136   EGFR mL/min/1.73m*2 10* 14*  --  22*   BUN mg/dL 40* 34*  --  29*   CREATININE mg/dL 4.62* 3.60*  --  2.46*    < > = values in this interval not displayed.    , Vit D:   Lab Results   Component Value Date    VITD25 37 02/05/2025        Nutrition Specific Medications:  Scheduled medications  atorvastatin, 40 mg, oral, Nightly  carvedilol, 12.5 mg, oral,  BID  [Held by provider] enoxaparin, 30 mg, subcutaneous, q24h  gabapentin, 300 mg, oral, BID  [Held by provider] hydrALAZINE, 50 mg, oral, TID  insulin lispro, 0-5 Units, subcutaneous, TID AC  [Held by provider] isosorbide mononitrate ER, 30 mg, oral, Daily  lidocaine, 5 mL, infiltration, Once  piperacillin-tazobactam, 2.25 g, intravenous, q6h  polyethylene glycol, 17 g, oral, Daily  [Held by provider] torsemide, 40 mg, oral, Daily  vitamin B complex-vitamin C-folic acid, 1 capsule, oral, Daily    Continuous medications  Continuous Medications[1]  PRN medications  PRN medications: dextrose, dextrose, glucagon, glucagon, oxyCODONE, vancomycin      I/O:    ; Stool Appearance: Loose (05/26/25 2015)    Dietary Orders (From admission, onward)       Start     Ordered    05/27/25 1052  Adult diet Consistent Carb; CCD 60 gm/meal  Diet effective now        Question Answer Comment   Diet type Consistent Carb    Carb diet selection: CCD 60 gm/meal        05/27/25 1051    05/24/25 2135  May Participate in Room Service  ( ROOM SERVICE MAY PARTICIPATE)  Once        Question:  .  Answer:  Yes    05/24/25 2134                     Estimated Needs:   Total Energy Estimated Needs in 24 hours (kCal):  (1387-5997 kcal)  Method for Estimating Needs: 30-35 kcal/kg AdjBW  Total Protein Estimated Needs in 24 Hours (g):  (75-95 g)  Method for Estimating 24 Hour Protein Needs: 1.2-1.5 g/kg AdjBW  Total Fluid Estimated Needs in 24 Hours (mL):  (1 ml/kcal or per med team)           Nutrition Diagnosis   Malnutrition Diagnosis  Patient has Malnutrition Diagnosis:  (Unable to determine at this time with lack of data)    Nutrition Diagnosis  Patient has Nutrition Diagnosis: Yes  Diagnosis Status (1): New  Nutrition Diagnosis 1: Increased nutrient needs  Related to (1): Increased metabolic demand  As Evidenced by (1): L foot infection, s/p debridement, excision, and reversal of sural flap       Nutrition Interventions/Recommendations   Nutrition  prescription for oral nutrition    Nutrition Recommendations:    Continue diet as tolerated.   Obtain daily weights.    Nutrition Interventions/Goals:   Ordered Prostat AWC (100 kcal, 17 g pro) BID         Nutrition Monitoring and Evaluation   Food/Nutrient Related History Monitoring  Monitoring and Evaluation Plan: Estimated Energy Intake  Estimated Energy Intake: Energy intake greater or equal to 75% of estimated energy needs    Anthropometric Measurements  Monitoring and Evaluation Plan: Body weight  Body Weight: Body weight - Maintain stable weight, Body weight - Weight reduction from fluids, as needed    Biochemical Data, Medical Tests and Procedures  Monitoring and Evaluation Plan: Electrolyte/renal panel, Glucose/endocrine profile  Criteria: WNL    Physical Exam Findings  Monitoring and Evaluation Plan: Skin  Other: Wound healing    Goal Status: New goal(s) identified    Time Spent (min): 40 minutes            [1]

## 2025-05-27 NOTE — PROGRESS NOTES
05/27/25 1446   Discharge Planning   Living Arrangements Spouse/significant other   Support Systems Spouse/significant other;Children   Assistance Needed assist for bathing   Type of Residence Private residence   Number of Stairs to Enter Residence 5   Number of Stairs Within Residence 0   Home or Post Acute Services In home services   Type of Home Care Services Home PT;Home nursing visits;Home OT   Expected Discharge Disposition Home Health   Does the patient need discharge transport arranged? No   Financial Resource Strain   How hard is it for you to pay for the very basics like food, housing, medical care, and heating? Not hard   Housing Stability   In the last 12 months, was there a time when you were not able to pay the mortgage or rent on time? N   At any time in the past 12 months, were you homeless or living in a shelter (including now)? N   Transportation Needs   In the past 12 months, has lack of transportation kept you from medical appointments or from getting medications? no   In the past 12 months, has lack of transportation kept you from meetings, work, or from getting things needed for daily living? No   Patient Choice   Provider Choice list and CMS website (https://medicare.gov/care-compare#search) for post-acute Quality and Resource Measure Data were provided and reviewed with: Patient   Patient / Family choosing to utilize agency / facility established prior to hospitalization Yes   Stroke Family Assessment   Stroke Family Assessment Needed No   Intensity of Service   Intensity of Service 0-30 min     Transitional Care Coordination Progress Note:  Patient discussed during interdisciplinary rounds.   Team members present: THOMAS BE  Plan per Medical/Surgical team: Left foot infection  Payor: [employer]  Discharge disposition: Home with  HC  Potential Barriers: medical  ADOD: 2 days    Previous Home Care: [facility]  DME: walker, rollator, cane, BSC, shower chair, CGM  Pharmacy: [facility] at  [address]  Falls: Denies  PCP:  Mone Aquino; last visit last week  Met with patient at bedside, provided introduction of self and role. Patient states she lives at home with significant other. Patient states she requires assist for bathing; safe at home. Patient states either daughter or significant other provide transport to appointments. Patient states no concerns obtaining/affording medications; states no social/financial concerns. Patient states she would like to resume services with [facility] at discharge. Patient states family to provide transport home at time of discharge. Will continue to follow for discharge planning needs.     Melissa VICTORIA, RN  Transitional Care Coordinator (TCC)  [phone]

## 2025-05-27 NOTE — ANESTHESIA PREPROCEDURE EVALUATION
Patient: Nuris Squires    Procedure Information       Date/Time: 05/27/25 0700    Procedures:       DEBRIDEMENT, LOWER EXTREMITY (Left)      CLOSURE, SURGICAL WOUND, LOWER EXTREMITY (Left)      BIOPSY, BONE, LOWER EXTREMITY (Left)    Location: [facility] OR 20 / [facility] OR    Surgeons: Socrates Ramos DPM        59 y.o. 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 Imaging concerning for gas formation. Started on vanc/zosyn/clinda, underwent L 4th and 5th metatarsal amputation and washout on 5/25, pending cultures. Post-operatively with some bleeding and anemia requiring reinforcement of wound dressing and transfusion.       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   (+) Mixed hyperlipidemia   (+) Peripheral arterial disease   (+) Peripheral vascular disease      /Renal   (+) Chronic renal insufficiency      Endocrine   (+) Autonomic neuropathy due to type 2 diabetes mellitus (Multi)   (+) Body mass index 40.0-44.9, adult (Multi)   (+) Diabetic autonomic neuropathy associated with type 2 diabetes mellitus   (+) Diabetic nephropathy (Multi)   (+) Morbid obesity (Multi)   (+) 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, with long-term current use of insulin   (+) Type 2 diabetes mellitus without complication   (+) Type 2 diabetes mellitus without complications      Hematology   (+) Anemia      Musculoskeletal  "  (+) Charcot arthropathy   (+) Chronic left-sided low back pain with left-sided sciatica      ID   (+) Left foot infection      Circulatory   (+) Acute diastolic heart failure      Genitourinary   (+) Stage 4 chronic kidney disease (Multi)        SURGICAL HISTORY:  Surgical History[3]     MEDICATIONS:  Current Outpatient Medications   Medication Instructions    amoxicillin-clavulanate (Augmentin) 875-125 mg tablet 875 mg, oral, 2 times daily    atorvastatin (LIPITOR) 40 mg, oral, Daily    B complex-vitamin C-folic acid (Nephro-[facility]) 0.8 mg tablet 1 tablet, oral, Daily    Basaglar KwikPen U-100 Insulin 15 Units, subcutaneous, Every morning, Take as directed per insulin instructions.    blood-glucose sensor (Torando LabsStyle Remington 3 Sensor) device Use to monitor blood glucose. Change sensor every 14 days.    carvedilol (COREG) 12.5 mg, oral, 2 times daily    ferrous sulfate 325 mg, oral, Daily with breakfast    FreeStyle Remington 3 [place] misc Use as instructed to monitor blood glucose.    gabapentin (NEURONTIN) 300 mg, oral, 2 times daily    hydrALAZINE (APRESOLINE) 50 mg, oral, 3 times daily    insulin lispro (Admelog SoloStar U-100 Insulin) 100 unit/mL injection Inject per sliding scale instructions under the skin 3 times a day with meals. (Max daily dose 70 units)    isosorbide mononitrate ER (IMDUR) 30 mg, oral, Daily, Do not crush or chew.    Mounjaro 5 mg, subcutaneous, Weekly    OneTouch Delica Plus Lancet 30 gauge misc USE TO TEST BLOOD SUGAR AS DIRECTED    OneTouch Ultra Test strip Use 1 strip BID to check glucose    OneTouch Ultra2 Meter misc use 1 to 2 times daily    oxyCODONE (ROXICODONE) 5 mg, oral, Every 4 hours PRN    pen needle, diabetic 32 gauge x 5/32\" needle Use four times a day with insulin use    torsemide (DEMADEX) 40 mg, oral, Daily        VITALS:      5/27/2025     4:23 AM 5/26/2025     8:32 PM 5/26/2025     1:42 PM   Vitals   Systolic 151 169 147   Diastolic 67 70 65   Heart Rate 71 86 74   Temp 36.5 " °C (97.7 °F) 37 °C (98.6 °F) 36.6 °C (97.9 °F)   Resp 16 17 16       LABS:   BMP   Lab Results   Component Value Date    GLUCOSE 242 (H) 05/27/2025    CALCIUM 7.8 (L) 05/27/2025     05/27/2025    K 4.2 05/27/2025    CO2 23 05/27/2025     05/27/2025    BUN 40 (H) 05/27/2025    CREATININE 4.62 (H) 05/27/2025   , LFT   Lab Results   Component Value Date    ALT 9 05/24/2025    AST 13 05/24/2025    ALKPHOS 70 05/24/2025    BILITOT 0.4 05/24/2025   , CBC  Lab Results   Component Value Date    WBC 9.3 05/27/2025    HGB 7.3 (L) 05/27/2025    HCT 23.0 (L) 05/27/2025    MCV 97 05/27/2025     05/27/2025      Lab Results   Component Value Date/Time    PROTIME 13.3 (H) 05/24/2025 1728    INR 1.2 (H) 05/24/2025 1728    APTT 31 05/24/2025 1728        IMAGES:  ECHO         Transthoracic Echo (TTE) Complete With Contrast 01/03/2025      PHYSICIAN INTERPRETATION:  Left Ventricle: Left ventricular ejection fraction is normal, calculated by Argueta's biplane at 69%. There are no regional left ventricular wall motion abnormalities. The left ventricular cavity size is normal. There is mildly increased septal and mildly increased posterior left ventricular wall thickness. There is left ventricular concentric remodeling. Spectral Doppler shows a Grade II (pseudonormal pattern) of left ventricular diastolic filling with an elevated left atrial pressure.  Left Atrium: The left atrium is moderately dilated.  Right Ventricle: The right ventricle is normal in size. There is normal right ventricular global systolic function.  Right Atrium: The right atrium is mildly dilated.  Aortic Valve: The aortic valve is trileaflet. There is minimal aortic valve cusp calcification. There is no evidence of aortic valve regurgitation. The peak instantaneous gradient of the aortic valve is 11 mmHg.  Mitral Valve: The mitral valve is normal in structure. There is moderate mitral valve regurgitation which is posteriorly directed. The mitral  regurgitant orifice area is 31 mm2. The mitral regurgitant volume is 55.50 ml. Mechanism of MR Is possibly restricted motion of the posterior leaflet.  Tricuspid Valve: The tricuspid valve is structurally normal. There is moderate tricuspid regurgitation. The Doppler estimated RVSP is moderately elevated at 45.2 mmHg.  Pulmonic Valve: The pulmonic valve is structurally normal. There is physiologic pulmonic valve regurgitation.  Pericardium: Small pericardial effusion. The effusion is circumferential.  Pleural: There is a small left pleural effusion.  Aorta: The aortic root is normal. The Ao Sinus is 2.90 cm. The Asc Ao is 3.30 cm.  Systemic Veins: The inferior vena cava appears normal in size, with IVC inspiratory collapse greater than 50%.  In comparison to the previous echocardiogram(s): Compared with study dated 1/25/2024, the degree of MR appears moderate today rather than mild on prior study. TR is also worsened.      CONCLUSIONS:  1. Left ventricular ejection fraction is normal, calculated by Argueta's biplane at 69%.  2. Spectral Doppler shows a Grade II (pseudonormal pattern) of left ventricular diastolic filling with an elevated left atrial pressure.  3. There is normal right ventricular global systolic function.  4. The left atrium is moderately dilated.  5. Moderate mitral valve regurgitation.  6. Mechanism of MR Is possibly restricted motion of the posterior leaflet.  7. Moderate tricuspid regurgitation visualized.  8. Moderately elevated right ventricular systolic pressure.  9. Small pericardial effusion.  10. Compared with study dated 1/25/2024, the degree of MR appears moderate today rather than mild on prior study. TR is also worsened.      [facility] 01/17/2025    Right Heart Catheterization:  Cardiac output is normal. RA: 8  PA: 51/14 m29    PCWP: 14  PA-SAT: 81  CO: 18.2  CI: 9.0.    Cardiac Cath Post Procedure Notes:  Post Procedure Diagnosis: Normal Rt heart filling pressures  Milldy elevated Lt heart  filling pressures with mild  to moderate elevation of PA pressures.  Blood Loss:               Estimated blood loss during the procedure was 20 mls.  Specimens Removed:        Number of specimen(s) removed: none.    ____________________________________________________________________________________  CONCLUSIONS:  1. Normal right sided filling pressures.  2. Mildly elevated left sided filling pressures.  3. High Cardiac output and index.      SOCIAL:  Tobacco Use History[4]   Social History     Substance and Sexual Activity   Alcohol Use Yes      Social History     Substance and Sexual Activity   Drug Use Never          Anesthesia Assessment:    Physical Exam    Airway  Mallampati: II  TM distance: >3 FB  Neck ROM: limited  Mouth opening: 3 or more finger widths     Cardiovascular    Dental        Pulmonary    Abdominal      Other findings: Airway 5/2025    Difficult Airway: No  Final Airway Type: supraglottic airway  Mask Difficulty Assessment: 1 - vent by mask  Final Supraglottic Airway: classic  SGA Size: 4  Number of Attempts at Approach: 1        Anesthesia Plan    History of general anesthesia?: yes  History of complications of general anesthesia?: no    ASA 3     general     The patient is not a current smoker.    intravenous induction   Postoperative pain plan includes opioids.  Anesthetic plan and risks discussed with patient.    Plan discussed with attending and CAA.    Plan general anesthesia with LMA use for securing the airway, peripheral IV access, and ASA standard monitors.  The possibility of blood product transfusion was also described in detail.  Risks, benefits, and alternatives to this plan were described to the patient, who indicated understanding and agreed to proceed.    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 01/06/2025    Personal history of other diseases of the  circulatory system     History of hypertension    Personal history of other endocrine, nutritional and metabolic disease     History of hyperlipidemia    Personal history of other endocrine, nutritional and metabolic disease     History of diabetes mellitus   [3]   Past Surgical History:  Procedure Laterality Date    CARDIAC CATHETERIZATION N/A 1/8/2025    Procedure: Right Heart Cath;  Surgeon: Migel Stanton MD;  Location: [place] [address] Cardiac Cath Lab;  Service: Cardiovascular;  Laterality: N/A;    CARDIAC CATHETERIZATION N/A 1/17/2025    Procedure: Right Heart Cath;  Surgeon: Aan Lea MD;  Location: [place] [address] Cardiac Cath Lab;  Service: Cardiovascular;  Laterality: N/A;    CT ANGIO NECK  1/25/2023    CT NECK ANGIO W AND WO IV CONTRAST 1/25/2023 DOCTOR OFFICE LEGACY    CT HEAD ANGIO W AND WO IV CONTRAST  1/25/2023    CT HEAD ANGIO W AND WO IV CONTRAST 1/25/2023 DOCTOR OFFICE LEGACY    OTHER SURGICAL HISTORY  10/21/2020    Toe amputation   [4]   Social History  Tobacco Use   Smoking Status Never    Passive exposure: Never   Smokeless Tobacco Never

## 2025-05-27 NOTE — CARE PLAN
The patient's goals for the shift include relax    The clinical goals for the shift include pt will remain stable during the shift      Problem: Pain - Adult  Goal: Verbalizes/displays adequate comfort level or baseline comfort level  Outcome: Progressing     Problem: Safety - Adult  Goal: Free from fall injury  Outcome: Progressing     Problem: Discharge Planning  Goal: Discharge to home or other facility with appropriate resources  Outcome: Progressing     Problem: Chronic Conditions and Co-morbidities  Goal: Patient's chronic conditions and co-morbidity symptoms are monitored and maintained or improved  Outcome: Progressing     Problem: Nutrition  Goal: Nutrient intake appropriate for maintaining nutritional needs  Outcome: Progressing     Problem: Diabetes  Goal: Achieve decreasing blood glucose levels by end of shift  Outcome: Progressing  Goal: Increase stability of blood glucose readings by end of shift  Outcome: Progressing  Goal: Decrease in ketones present in urine by end of shift  Outcome: Progressing  Goal: Maintain electrolyte levels within acceptable range throughout shift  Outcome: Progressing  Goal: Maintain glucose levels >70mg/dl to <250mg/dl throughout shift  Outcome: Progressing  Goal: No changes in neurological exam by end of shift  Outcome: Progressing  Goal: Learn about and adhere to nutrition recommendations by end of shift  Outcome: Progressing  Goal: Vital signs within normal range for age by end of shift  Outcome: Progressing  Goal: Increase self care and/or family involovement by end of shift  Outcome: Progressing  Goal: Receive DSME education by end of shift  Outcome: Progressing

## 2025-05-27 NOTE — SIGNIFICANT EVENT
S/P LEFT LOWER EXTREMITY: DEBRIDEMENT, DELAYED PRIMARY CLOSURE, METATARSAL RESECTION  Restart all medications and diet.  Nursing okay to reinforce if bleeding  Cultures Obtained  Podiatry to change dressings within the next 24  Elevate Left Foot/Leg when at rest  NWB left foot // Partial weight bearing left foot  Please dispense post op shoe to the left foot  Pain management per Primary  Case discussed with Attending    Barrington Llanos DPM PGY-3

## 2025-05-27 NOTE — CONSULTS
"Inpatient consult to Infectious Diseases  Consult performed by: Scooby Walden MD  Consult ordered by: Mikala Escoto MD        Primary MD: Mone Aquino MD    Reason For Consult: Antibiotic recommendations post left TMA      History Of Present Illness  59-year-old with peripheral arterial disease, diabetes, HFpEF.   Patient's status post successful healed right TMA.  Patient also previously had amputation of the distal phalanx of the left great toe and the left second toe at MTP joint.  Patient recently referred from primary care physician to podiatry regarding large nonhealing left wound on the plantar surface of the left foot.  See pictures.  Patient had x-rays which showed erosion consistent with osteomyelitis involving several portions of the left 4th and 5th toe.    On 5/25/2025 patient underwent left TMA.  \"The distal aspects of the 4th and 5th metatarsals were removed after a sagittal saw was used to perform the osteotomy of the metatarsals.  It was clear after resection of the bone that the remaining bone left intact was viable and appeared to have no signs of osteomyelitis or other infection.\"    On 5/27/2025 patient underwent completion of left TMA and closure.  \"The distal aspects of the 1st, 2nd, 3rd metatarsals were removed with  a sagittal saw was used to perform the osteotomy of the metatarsals . . .. It was clear after resection of the bone that the remaining bone left intact was viable and appeared to have no signs of osteomyelitis or other infection.. . .The wound was then debrided using the [employer] micro debridement system. The a post-lavage culture . . .\"    TODAY 5/27/2025:  ID consult regarding antibiotic management.  Post TMA and post lavage culture pending.  Theoretically patient would not need any antibiotics now that there is definitive source control.  However in general treat patients with antibiotics until there is sufficient healing and granulation especially in settings with " diabetes and peripheral vascular disease where healing may be delayed.    Currently patient feels well.  Denies any fever, chills, headache, shortness of breath or chest pain.      Past Medical History  She has a past medical history of CKD (chronic kidney disease), Hyperlipidemia, Hypertension, Hypertensive heart and kidney disease with HF and with CKD stage IV (01/06/2025), Personal history of other diseases of the circulatory system, Personal history of other endocrine, nutritional and metabolic disease, and Personal history of other endocrine, nutritional and metabolic disease.    Surgical History  She has a past surgical history that includes Other surgical history (10/21/2020); CT angio head w and wo IV contrast (1/25/2023); CT angio neck (1/25/2023); Cardiac catheterization (N/A, 1/8/2025); and Cardiac catheterization (N/A, 1/17/2025).     Social History     Occupational History    Not on file   Tobacco Use    Smoking status: Never     Passive exposure: Never    Smokeless tobacco: Never   Vaping Use    Vaping status: Never Used   Substance and Sexual Activity    Alcohol use: Yes    Drug use: Never    Sexual activity: Defer     Travel History   Travel since 04/27/25    No documented travel since 04/27/25            Family History  Family History[1]  Allergies  Patient has no known allergies.     Immunization History   Administered Date(s) Administered    COVID-19, mRNA, LNP-S, PF, 30 mcg/0.3 mL dose 03/24/2021, 04/18/2021    SARS-CoV-2, Unspecified 03/22/2021     Medications  Home medications:  Prescriptions Prior to Admission[2]  Current medications:  Scheduled medications  Scheduled Medications[3]  Continuous medications  Continuous Medications[4]  PRN medications  PRN Medications[5]     Objective  Range of Vitals (last 24 hours)  Heart Rate:  [64-86]   Temp:  [36.2 °C (97.2 °F)-37 °C (98.6 °F)]   Resp:  [12-17]   BP: (150-186)/(65-77)   SpO2:  [90 %-100 %]   Daily Weight  05/24/25 : 102 kg (225 lb)    Body  mass index is 41.15 kg/m².     Physical Exam  Patient is supine in bed  Patient's boyfriend present in the room and remembers me from February 2025 when I saw in the hospital as she was completing a course of IV antibiotics for right foot infection   Alert and oriented x 3, normal conversation  No acute distress  Extraocular muscles intact  Anterolateral lungs clear, S1, S2, I did not hear murmur  Nontender abdomen  Right foot warm and well-perfused with well-healed TMA incision  Left foot in surgical dressing and not examined.      Labs  Results from last 72 hours   Lab Units 05/27/25  0408 05/26/25  0928 05/26/25  0110 05/25/25  2342 05/25/25  0925   WBC AUTO x10*3/uL 9.3 9.1  --  9.1 4.9   HEMOGLOBIN g/dL 7.3* 6.9* 6.3* 6.1* 6.7*   HEMATOCRIT % 23.0* 22.6*  --  19.7* 22.2*   PLATELETS AUTO x10*3/uL 243 253  --  247 287   NEUTROS PCT AUTO %  --   --   --   --  51.6   LYMPHS PCT AUTO %  --   --   --   --  33.7   MONOS PCT AUTO %  --   --   --   --  9.6   EOS PCT AUTO %  --   --   --   --  4.1     Results from last 72 hours   Lab Units 05/27/25 0408 05/26/25  0928   SODIUM mmol/L 136 138   POTASSIUM mmol/L 4.2 4.2   CHLORIDE mmol/L 105 106   CO2 mmol/L 23 24   BUN mg/dL 40* 34*   CREATININE mg/dL 4.62* 3.60*   GLUCOSE mg/dL 242* 166*   CALCIUM mg/dL 7.8* 7.6*   ANION GAP mmol/L 12 12   EGFR mL/min/1.73m*2 10* 14*   PHOSPHORUS mg/dL 5.3* 5.1*     Results from last 72 hours   Lab Units 05/27/25 0408 05/26/25  0928   ALBUMIN g/dL 2.5* 2.5*     Estimated Creatinine Clearance: 14.5 mL/min (A) (by C-G formula based on SCr of 4.62 mg/dL (H)).  C-REACTIVE PROTEIN   Date Value Ref Range Status   02/05/2025 6.7 <8.0 mg/L Final     C-Reactive Protein   Date Value Ref Range Status   05/24/2025 2.21 (H) <1.00 mg/dL Final   02/05/2024 1.29 (H) <1.00 mg/dL Final     SED RATE BY MODIFIED WESTERGREN   Date Value Ref Range Status   02/05/2025 36 (H) < OR = 30 mm/h Final     Sedimentation Rate   Date Value Ref Range Status    05/24/2025 42 (H) 0 - 30 mm/h Final   02/05/2024 43 (H) 0 - 30 mm/h Final     Outpatient 5/23/2025 culture (Dr. Zelaya, [facility])  Test Status:       Final     Specimen Source:   Foot, left     Specimen Quality:  Adequate     Result:            Moderate growth of Proteus mirabilis     COMMENT:           Skin breanna also present. =                          P.mirabilis                             ----------------                               INT   STEVEN      AMOX/CLAVULANATE       S     8      AMP/SULBACTAM    S     8      CEFAZOLIN                 R     8 **1      CEFEPIME                  S     <=0.12     CEFTAZIDIME               S     <=1     CEFTRIAXONE               S     <=0.25     CIPROFLOXACIN             R     >=4     GENTAMICIN                S     <=1     MEROPENEM                 S     <=0.25     PIP/TAZOBACTAM            S     <=4     TRIMETHOPRIM/SULFA      R     >=320     Assessment/Plan   59-year-old female with a history of peripheral arterial disease and diabetes who presents with chronic left plantar foot ulcer.  Large wound occupying majority of the midfoot.  Patient went on for two-stage surgical debridement.  On 5/25/2025 patient had initial  partial TMA and resection of 4th and 5th  toes and on 5/27/2025 patient had completion of TMA involving metatarsals #1 2 and 3.  The left midfoot was left open and dressed until formal closure later.    Post TMA and post lavage culture pending.  5/23/25 outpatient D.P.M. foot culture showed Proteus mirabilis susceptible to Augmentin but resistant to cefazolin, Cipro and TMP-SMX.  May need to use this as an indicator for antibiotic treatment but limited oral option of Augmentin.    Theoretically patient would not need any antibiotics now that there is definitive source control.  However in general treat patients with antibiotics until there is sufficient healing and granulation especially in settings with diabetes and peripheral vascular disease where healing  may be delayed.      Recommendations:  1.  Discontinue clindamycin  2.  Continue vancomycin and Zosyn  3.  Follow-up on post lavage, TMA culture from 5/27/2025  4.  Anticipate 2 to 4 weeks of antibiotic treatment and hopefully an oral regimen depending on post lavage TMA culture.  Will advise.    Discussed with patient and her fiancé    Scooby Walden MD  ID Staff  I spent 60 minutes in the professional and overall care of this patient.       [1]   Family History  Problem Relation Name Age of Onset    Alzheimer's disease Mother      Diabetes Mother      Lung cancer Mother      Diabetes Father      Lung cancer Father      Pancreatic cancer Father      Diabetes Sister      Lung cancer Sister     [2]   Medications Prior to Admission   Medication Sig Dispense Refill Last Dose/Taking    carvedilol (Coreg) 12.5 mg tablet Take 1 tablet (12.5 mg) by mouth 2 times a day. 60 tablet 1 Taking    amoxicillin-clavulanate (Augmentin) 875-125 mg tablet Take 1 tablet (875 mg) by mouth 2 times a day for 10 days. 20 tablet 0     atorvastatin (Lipitor) 40 mg tablet Take 1 tablet (40 mg) by mouth once daily. 30 tablet 1     B complex-vitamin C-folic acid (Nephro-Duke) 0.8 mg tablet Take 1 tablet by mouth once daily. 90 tablet 0     blood-glucose sensor (FreeStyle Remington 3 Sensor) device Use to monitor blood glucose. Change sensor every 14 days. 2 each 11     ferrous sulfate tablet Take 1 tablet (325 mg) by mouth once daily with breakfast. 90 tablet 0     FreeStyle Remington 3 [place] misc Use as instructed to monitor blood glucose. 1 each 0     gabapentin (Neurontin) 300 mg capsule Take 1 capsule (300 mg) by mouth 2 times a day. 180 capsule 3     hydrALAZINE (Apresoline) 50 mg tablet Take 1 tablet (50 mg) by mouth 3 times a day. 270 tablet 3     insulin glargine (Basaglar KwikPen U-100 Insulin) 100 unit/mL (3 mL) pen Inject 15 Units under the skin once daily in the morning. Take as directed per insulin instructions.       insulin lispro  "(Admelog SoloStar U-100 Insulin) 100 unit/mL injection Inject per sliding scale instructions under the skin 3 times a day with meals. (Max daily dose 70 units) 15 mL 1     isosorbide mononitrate ER (Imdur) 30 mg 24 hr tablet Take 1 tablet (30 mg) by mouth once daily. Do not crush or chew. 30 tablet 3     OneTouch Delica Plus Lancet 30 gauge misc USE TO TEST BLOOD SUGAR AS DIRECTED 200 each 0     OneTouch Ultra Test strip Use 1 strip BID to check glucose 200 strip 1     OneTouch Ultra2 Meter misc use 1 to 2 times daily 1 each 0     oxyCODONE (Roxicodone) 5 mg immediate release tablet Take 1 tablet (5 mg) by mouth every 4 hours if needed for severe pain (7 - 10) or moderate pain (4 - 6). 42 tablet 0     pen needle, diabetic 32 gauge x 5/32\" needle Use four times a day with insulin use 360 each 1     [] sodium hypochlorite (Dakin's HALF-Strength) 0.25 % external solution Apply topically 1 time for 1 dose. Use for daily dressing changes for left foot 473 mL 2     tirzepatide (Mounjaro) 5 mg/0.5 mL pen injector Inject 5 mg under the skin 1 (one) time per week. 2 mL 1     torsemide (Demadex) 20 mg tablet Take 2 tablets (40 mg) by mouth once daily. 120 tablet 0    [3] atorvastatin, 40 mg, oral, Nightly  carvedilol, 12.5 mg, oral, BID  [Held by provider] enoxaparin, 30 mg, subcutaneous, q24h  gabapentin, 300 mg, oral, BID  [Held by provider] hydrALAZINE, 50 mg, oral, TID  insulin lispro, 0-5 Units, subcutaneous, TID AC  [Held by provider] isosorbide mononitrate ER, 30 mg, oral, Daily  lidocaine, 5 mL, infiltration, Once  piperacillin-tazobactam, 2.25 g, intravenous, q6h  polyethylene glycol, 17 g, oral, Daily  [Held by provider] torsemide, 40 mg, oral, Daily  vitamin B complex-vitamin C-folic acid, 1 capsule, oral, Daily    [4]    [5] PRN medications: dextrose, dextrose, glucagon, glucagon, oxyCODONE, vancomycin    "

## 2025-05-27 NOTE — PROGRESS NOTES
Vancomycin Dosing by Pharmacy- FOLLOW UP    Nurisvirginia Squires is a 59 y.o. year old female who Pharmacy has been consulted for vancomycin dosing for osteomyelitis/septic arthritis. Based on the patient's indication and renal status this patient is being dosed based on a goal trough/random level of 15-20.     Renal function is currently declining.    Current vancomycin dose: 1000 mg given every 24 hours    Most recent random level: 27.2 mcg/mL    Visit Vitals  /77   Pulse 66   Temp 36.4 °C (97.5 °F)   Resp 12        Lab Results   Component Value Date    CREATININE 4.62 (H) 2025    CREATININE 3.60 (H) 2025    CREATININE 2.46 (H) 2025    CREATININE 2.77 (H) 2025    CREATININE 2.44 (H) 2025        Patient weight is as follows:   Vitals:    25 1307   Weight: 102 kg (225 lb)       Cultures:  No results found for the encounter in last 14 days.       I/O last 3 completed shifts:  In: 1333.3 (13.1 mL/kg) [Blood:1033.3; IV Piggyback:300]  Out: 300 (2.9 mL/kg) [Urine:300 (0.1 mL/kg/hr)]  Weight: 102.1 kg   I/O during current shift:  I/O this shift:  In: 745 [I.V.:45; Blood:350; IV Piggyback:350]  Out: 50 [Blood:50]    Temp (24hrs), Av.6 °C (97.8 °F), Min:36.2 °C (97.2 °F), Max:37 °C (98.6 °F)      Assessment/Plan    Patient with CKD without HD. Baseline scr ~3.6-4.1, scr has increased by 2 in the last 48hr so will not treat as dose by level. Will not give a dose today and will recheck level tomorrow.     The next level will be obtained on  at 0500. May be obtained sooner if clinically indicated.   Will continue to monitor renal function daily while on vancomycin and order serum creatinine at least every 48 hours if not already ordered.  Follow for continued vancomycin needs, clinical response, and signs/symptoms of toxicity.       Odette Squires, PharmD

## 2025-05-27 NOTE — PROGRESS NOTES
Nuris Squires is a 59 y.o. female on day 3 of admission presenting with Left foot infection.        Objective     Last Recorded Vitals  /65   Pulse 65   Temp 36.3 °C (97.3 °F)   Resp 12   Wt 102 kg (225 lb)   SpO2 95%   Intake/Output last 3 Shifts:    Intake/Output Summary (Last 24 hours) at 5/27/2025 1042  Last data filed at 5/27/2025 1032  Gross per 24 hour   Intake 1395 ml   Output 350 ml   Net 1045 ml       Admission Weight  Weight: 102 kg (225 lb) (05/24/25 1307)    Daily Weight  05/24/25 : 102 kg (225 lb)    Image Results  Bedside Midline Imaging  These images are not reportable by radiology and will not be interpreted   by  Radiologists.  MR foot left w and wo IV contrast  Narrative: Interpreted By:  Randal Garcia  and Tre Bergeron   STUDY:  MRI of the left forefoot without and with IV contrast;      INDICATION:  Signs/Symptoms:Osteo L foot          COMPARISON:  Same day foot CT      ACCESSION NUMBER(S):  [id number]      ORDERING CLINICIAN:  NGA CASTILLO      TECHNIQUE:  Multiplanar multisequence MRI of the  left forefoot was performed  without and with IV contrast      FINDINGS:  Postsurgical changes from prior 1st digit interphalangeal and 2nd  digit MTP joint amputations are seen.      Soft tissues: There is a plantar soft tissue ulcer at the level of  the  4th metatarsophalangeal joint with associated subjacent soft  tissue edema and enhancement, suggestive of cellulitis. Questionable  fluid collection versus confluent edema is seen in the plantar soft  tissues at the level of the 3rd mid metatarsal measuring 1.0 x 0.4 x  1.0 cm.      Bones:  There is STIR hyperintensity and loss of normal T1 marrow  signal involving approximate of the distal half of the 4th metatarsal  and the majority of the 4th proximal phalanx with associated osseous  irregularity at the joint. There is abnormal marrow signal the 4th  middle phalanx on STIR images. Additionally there is STIR  hyperintensity of  the 5th proximal and middle phalanges with subtle  loss of normal T1 marrow signal (series 8, image 21). There is marrow  edema of the 5th metatarsal head. Mild patchy marrow edema involving  the base of the 3rd proximal phalanx. Likely pathologic fracture of  the 4th metatarsal neck.      Muscles:  Edema and atrophy of the plantar foot musculature,  compatible with diabetic myopathy and/or myositis.      Miscellaneous:  Visualized tendons and Lisfranc ligament are grossly  intact.      Impression: 1. Osteomyelitis of the 4th metatarsal, 4th proximal phalanx, and  high likelihood of osteomyelitis of the 4th middle phalanx, as above.  Pathologic fracture of 4th metatarsal neck.  2. Osteomyelitis of the 5th proximal and middle phalanges, as above.  High likelihood of osteolysis of the 5th metatarsal head.  3. High likelihood of osteomyelitis 3rd proximal phalangeal base.  4. Multifocal soft tissue ulceration, cellulitis, and likely small  subcutaneous abscess, as above.  5. Postsurgical changes from prior 1st digit interphalangeal and 2nd  digit MTP joint amputations          I personally reviewed the images/study and I agree with the resident  Rubio Toledo's findings as stated. This study was interpreted  at [place], [place].      MACRO:  None      Signed by: Randal Garcia 5/25/2025 7:44 AM  Dictation workstation:   [id number]      Physical Exam  Constitutional: Pleasant elderly female, alert active, cooperative not in acute distress  Eyes: PERRLA, clear sclera  ENMT: Moist mucosal membranes, no exudate  Head / Neck: Atraumatic, normocephalic, supple neck, JVP not visualized  Lungs: Patent airways, CTABL  Heart: RRR, S1S2, no murmurs appreciated, palpable pulses in all extremities  GI: Soft, NT, ND, bowel sounds present in all quadrants  MSK: Moves all extremities freely, no restriction  of ROM, no joint edema  Extremities: Left foot covered with dressing dry and  intact,  Midline insertion in the right arm, no peripheral edema  : No Louis catheter inserted  Breast: Deferred  Neurological: AAO x 3 to person, place and date, facial muscles symmetrical, sensation intact, strength 4/4, no acute focal neurological deficits appreciated  Psychological: Appropriate mood and behavior    Relevant Results                Scheduled medications  Scheduled Medications[1]  Continuous medications  Continuous Medications[2]  PRN medications  PRN Medications[3]    Assessment & Plan  Left foot infection    Chronic ulcer of left foot with fat layer exposed (Multi)    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      -----------------------------  AP    Nuris Squires is a 59 y.o. 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  Osteomyelitis of the left foot, status post left 4th and 5th digit metatarsal amputation and washout, podiatry following.  Discharge pending result of tissue culture to determine if Discharge pending result of tissue culture to determine if antibiotic necessary.    #S/P LLE I&D  -Post op shoe to the left foot requested   -ID consulted to determine abx duration.  -She would like to know her options for nursing home instead of going home. PTOT requested.   -Reports  pain is controlled.  -Will resume heparin tomorrow if blood counts stable and bleeding controlled.     #Hypertensive this PM, resumed torsemide, hydralazine and imdur. Will reassess BP.     Time 25 min       -------------------------  Previous course      #Osteomyelitis of L foot  #Diabetic gas forming infection of  left foot   #Status post podiatry intervention with  L 4th and 5th metatarsal amputation and washout  on 5/25  -Wound cultures sent    -Clindamycin discontinued postoperatively  -Zosyn 2.25 g IV piggyback every 6 hours, vancomycin 1000 mg IV piggyback daily  -blood cultures 5/24  -Podiatry consulted: s/p left foot 4th and 5th digit amputation, scheduled for Left foot 5/27:  Delayed Primary Closure, Bone cortex Excision, Reverse Sural Flap 5/27/25.   - ID consulted for further information on culutres  - IS post-op, weaning to RA, currently on 2L 100% post-op, do not suspect o2 need, weaning, monitoring return of bowel and bladder fucntion,   - pt/ot, mobilize pt as tolerated, NWB to LLE.        Acute on chronic anemia. Stable   - 2/2 to operative bleeding  - AM lab to reassess     HFpEF/HTN  - compensated  - continue carvedilol  - Home imdur, hydralazine, torsemide     T2DM  -SSI while inpatient, carb controlled diet   -hypoglycemia protocol      Diet  - Diabetic    DVT prophylaxis  - Omission of heparin anticoagulation due to acute on chronic anemia in the setting of surgical procedure.  Continue holding Lovenox 30 mg subcu daily    Disposition: Presented with osteomyelitis of the left foot, need further management, final recommendation pending to see culture result and ID consult for outpatient IV antibiotics.    ADOD in 2 days      Mikala Escoto MD           [1] [Transfer Hold] atorvastatin, 40 mg, oral, Nightly  [Transfer Hold] carvedilol, 12.5 mg, oral, BID  [Held by provider] enoxaparin, 30 mg, subcutaneous, q24h  [Transfer Hold] gabapentin, 300 mg, oral, BID  [Held by provider] hydrALAZINE, 50 mg, oral, TID  [Transfer Hold] insulin lispro, 0-5 Units, subcutaneous, TID AC  [Held by provider] isosorbide mononitrate ER, 30 mg, oral, Daily  [Transfer Hold] lidocaine, 5 mL, infiltration, Once  [Transfer Hold] piperacillin-tazobactam, 2.25 g, intravenous, q6h  [Transfer Hold] polyethylene glycol, 17 g, oral,  Daily  [Held by provider] torsemide, 40 mg, oral, Daily  [Transfer Hold] vancomycin, 1,000 mg, intravenous, q24h  [Transfer Hold] vitamin B complex-vitamin C-folic acid, 1 capsule, oral, Daily     [2]    [3] PRN medications: [Transfer Hold] dextrose, [Transfer Hold] dextrose, [Transfer Hold] glucagon, [Transfer Hold] glucagon, [Transfer Hold] oxyCODONE, [Transfer Hold] vancomycin

## 2025-05-27 NOTE — CARE PLAN
The patient's goals for the shift include relax    The clinical goals for the shift include pt will remain stable during the shift        Problem: Pain - Adult  Goal: Verbalizes/displays adequate comfort level or baseline comfort level  5/27/2025 1839 by Basilia Gonzáles RN  Outcome: Progressing  5/27/2025 1838 by Basilia Gonzáles RN  Outcome: Progressing     Problem: Safety - Adult  Goal: Free from fall injury  5/27/2025 1839 by Basilia Gonzálse RN  Outcome: Progressing  5/27/2025 1838 by Basilia Gonzáles RN  Outcome: Progressing     Problem: Chronic Conditions and Co-morbidities  Goal: Patient's chronic conditions and co-morbidity symptoms are monitored and maintained or improved  5/27/2025 1839 by Basilia Gonzáles RN  Outcome: Progressing  5/27/2025 1838 by Basilia Gonzáles RN  Outcome: Progressing     Problem: Nutrition  Goal: Nutrient intake appropriate for maintaining nutritional needs  5/27/2025 1839 by Basilia Gonzáles RN  Outcome: Progressing  5/27/2025 1838 by Basilia Gonzáles RN  Outcome: Progressing     Problem: Diabetes  Goal: Achieve decreasing blood glucose levels by end of shift  5/27/2025 1839 by Basilia Gonzáles RN  Outcome: Progressing  5/27/2025 1838 by Basilia Gonzáles RN  Outcome: Progressing  Goal: Increase stability of blood glucose readings by end of shift  5/27/2025 1839 by Basilia Gonzáles RN  Outcome: Progressing  5/27/2025 1838 by Basilia Gonzáles RN  Outcome: Progressing  Goal: Decrease in ketones present in urine by end of shift  5/27/2025 1839 by Basilia Gonzáles RN  Outcome: Progressing  5/27/2025 1838 by Basilia Gonzáles RN  Outcome: Progressing  Goal: Maintain electrolyte levels within acceptable range throughout shift  5/27/2025 1839 by Basilia Gonzáles RN  Outcome: Progressing  5/27/2025 1838 by Basilia Gonzáles RN  Outcome: Progressing  Goal: Maintain glucose levels  >70mg/dl to <250mg/dl throughout shift  5/27/2025 1839 by Basilia Gonzáles RN  Outcome: Progressing  5/27/2025 1838 by Basilia Gonzáles RN  Outcome: Progressing  Goal: No changes in neurological exam by end of shift  5/27/2025 1839 by Basilia Gonzáles RN  Outcome: Progressing  5/27/2025 1838 by Basilia Gonzáles RN  Outcome: Progressing  Goal: Learn about and adhere to nutrition recommendations by end of shift  5/27/2025 1839 by Basilia Gonzáles RN  Outcome: Progressing  5/27/2025 1838 by Basilia Gonzáles RN  Outcome: Progressing  Goal: Vital signs within normal range for age by end of shift  5/27/2025 1839 by Basilia Gonzáles RN  Outcome: Progressing  5/27/2025 1838 by Basilia Gonzáles RN  Outcome: Progressing  Goal: Increase self care and/or family involovement by end of shift  5/27/2025 1839 by Basilia Gonzáles RN  Outcome: Progressing  5/27/2025 1838 by Basilia Gonzáles RN  Outcome: Progressing  Goal: Receive DSME education by end of shift  5/27/2025 1839 by Basilia Gonzáles RN  Outcome: Progressing  5/27/2025 1838 by Basilia Gonzáles RN  Outcome: Progressing

## 2025-05-27 NOTE — CARE PLAN
The patient's goals for the shift include relax    The clinical goals for the shift include pt will remain stable during the shift    Pt met goals and had an uneventful night

## 2025-05-27 NOTE — H&P
H&P reviewed. The patient was examined and there are no changes to the H&P.    MEDICINE ADMISSION NOTE  Subjective   History of Present Illness  Nuris Squires is a 59 y.o. 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 Garcia DPM for osteomyelitis of L foot.    She saw her PCP 5/20 and then podiatry 5/23 who followed up foot xrays concerning for osteomyelitis vs septic arthritis with subcutaneous gas. She was recommended to go to the ER.      ED Course:  BP: 135/62  Temperature: 36.8 °C (98.2 °F)  Heart Rate: 70  Respirations: 16  MAP (mmHg): 86  Pulse Ox: 95 %    In the ER, podiatry  consulted however no notes nor provider has seen the patient as of 6:35pm.    ED Interventions:  Medications   vancomycin (Vancocin) pharmacy to dose - pharmacy monitoring (has no administration in time range)   piperacillin-tazobactam (Zosyn) 3.375 g in dextrose (iso) IV 50 mL (has no administration in time range)   vancomycin (Vancocin) 1,000 mg in dextrose 5%  mL (has no administration in time range)     Presently:  Patient denies any fever, chills, lightheadedness, shortness of breath, chest pain, abdominal pain, nor N/V/C/D. She denies sick contacts.    She endorses a several week history of ulceration on her L foot. She states her pain is controlled presently. She states she is anxious about what will need to be done. She states her and her family have been wrapping the foot but it is draining and soaking through the bandaging.     She denies questions and states her NOK/decision makers are her brother and daughter.    Cardiac Hx:  Last echo: 1/3/25   CONCLUSIONS:   1. Left ventricular ejection fraction is normal, calculated by Argueta's biplane at 69%.   2. Spectral Doppler shows a Grade II (pseudonormal pattern) of left ventricular diastolic filling with an elevated left atrial pressure.   3. There is normal right ventricular  global systolic function.   4. The left atrium is moderately dilated.   5. Moderate mitral valve regurgitation.   6. Mechanism of MR Is possibly restricted motion of the posterior leaflet.   7. Moderate tricuspid regurgitation visualized.   8. Moderately elevated right ventricular systolic pressure.   9. Small pericardial effusion.  10. Compared with study dated 1/25/2024, the degree of MR appears moderate today rather than mild on prior study. TR is also worsened.    Last cath: 1/17/25  /65, MAP 86, RA 8, PA 52/14, PA mean 29, wedge 14, Yung CI 9   CONCLUSIONS:   1. Normal right sided filling pressures.   2. Mildly elevated left sided filling pressures.   3. High Cardiac output and index.  1/8/25  RA 14, PA 58/14, PA mean 34, wedge 20, F CI 5.1  CONCLUSIONS:   1. Elevated right and left filling pressures, preserved CO/CI.   2. Mild pulmonary hypertension.    Last EKG:   Encounter Date: 05/24/25   ECG 12 lead   Result Value    Ventricular Rate 67    Atrial Rate 67    VA Interval 134    QRS Duration 84    QT Interval 422    QTC Calculation(Bazett) 445    P Axis 57    R Axis 133    T Axis 46    QRS Count 12    Q Onset 221    P Onset 154    P Offset 213    T Offset 432    QTC Fredericia 438    Narrative    Normal sinus rhythm  Right axis deviation  Low voltage QRS  Cannot rule out Anterior infarct (cited on or before 15-ISAK-2024)  Abnormal ECG  When compared with ECG of 15-ISAK-2025 15:39,  QRS axis Shifted right    See ED provider note for full interpretation and clinical correlation  Confirmed by Lydia Londono ([id number]) on 5/24/2025 8:47:25 PM   Stress 10/2019  1. Normal myocardial perfusion study without evidence of ischemia or prior infarction.  2. The left ventricle is normal in size.  3. Normal LV wall motion with an LV EF estimated at greater than 65%.     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 She has never been exposed to tobacco smoke. She has never used smokeless tobacco. She reports current alcohol use. She reports that she does not use drugs.    Family History  Family History[3]     Allergies  Patient has no known allergies.    Objective   Current Vitals  Vitals:    05/26/25 1257 05/26/25 1342 05/26/25 2032 05/27/25 0423   BP: (!) 116/49 147/65 169/70 151/67   Pulse: 70 74 86 71   Resp: 16 16 17 16   Temp: 36.7 °C (98.1 °F) 36.6 °C (97.9 °F) 37 °C (98.6 °F) 36.5 °C (97.7 °F)   TempSrc: Temporal Temporal     SpO2: 96% 96% 94% 92%   Weight:       Height:          I/O last 3 completed shifts:  In: 2383.3 (23.4 mL/kg) [I.V.:100 (1 mL/kg); Blood:1033.3; IV Piggyback:1250]  Out: 100 (1 mL/kg) [Blood:100]  Weight: 102.1 kg     Labs:   CBC            7.3     9.3>---------<243              23.0   BMP  136     105    40                  ---------------------<242     4.2       23     4.62            Ca 7.8 Phos 5.3 Mg 2.90       ALT 9 AST 13 AlkPhos 70 Total bili 0.4       Last VBG:  POCT pH, Venous   Date Value Ref Range Status   01/02/2025 7.34 7.33 - 7.43 pH Final     POCT pCO2, Venous   Date Value Ref Range Status   01/02/2025 47 41 - 51 mm Hg Final     POCT pO2, Venous   Date Value Ref Range Status   01/02/2025 48 (H) 35 - 45 mm Hg Final     POCT Lactate, Venous   Date Value Ref Range Status   01/02/2025 0.7 0.4 - 2.0 mmol/L Final   Last ABG:  No results found for: \"PHART\", \"FMP5FZJ\", \"PO2ART\", \"LACTATEART\"    Imaging  No results found.    Micro/culture data:  No results found for the last 120 days.    Physical Exam  Vitals reviewed.   Constitutional:       General: She is not in acute distress.     " Appearance: She is obese. She is ill-appearing.   HENT:      Head: Normocephalic and atraumatic.   Cardiovascular:      Rate and Rhythm: Normal rate and regular rhythm.      Pulses: Normal pulses.      Heart sounds: Normal heart sounds. No murmur heard.  Pulmonary:      Effort: Pulmonary effort is normal.      Breath sounds: Normal breath sounds. No wheezing, rhonchi or rales.   Abdominal:      General: Bowel sounds are normal. There is no distension.      Palpations: Abdomen is soft.      Tenderness: There is no abdominal tenderness. There is no guarding or rebound.   Musculoskeletal:      Left lower leg: Edema present.   Skin:     General: Skin is warm and dry.      Capillary Refill: Capillary refill takes 2 to 3 seconds.   Neurological:      Mental Status: She is alert and oriented to person, place, and time.   Psychiatric:         Mood and Affect: Mood normal.         Behavior: Behavior normal.         Thought Content: Thought content normal.         Judgment: Judgment normal.     Medications  Home Meds  Prior to Admission medications    Medication Sig Start Date End Date Taking? Authorizing Provider   amoxicillin-clavulanate (Augmentin) 875-125 mg tablet Take 1 tablet (875 mg) by mouth 2 times a day for 10 days. 5/20/25 5/30/25  Mone Aquino MD   atorvastatin (Lipitor) 40 mg tablet Take 1 tablet (40 mg) by mouth once daily. 1/23/25 5/15/25  SEDA Finney   B complex-vitamin C-folic acid (Nephro-Duke) 0.8 mg tablet Take 1 tablet by mouth once daily. 2/27/25   Mone Aquino MD   blood-glucose sensor (FreeStyle Remington 3 Sensor) device Use to monitor blood glucose. Change sensor every 14 days. 2/1/24   Mone Aquino MD   carvedilol (Coreg) 12.5 mg tablet Take 1 tablet (12.5 mg) by mouth 2 times a day. 1/23/25 5/15/25  SEDA Finney   ferrous sulfate tablet Take 1 tablet (325 mg) by mouth once daily with breakfast. 2/27/25 5/28/25  Mone Aquino MD   FreeStyle Remington 3 Lily misc Use as  "instructed to monitor blood glucose. 2/1/24   Mone Aquino MD   gabapentin (Neurontin) 300 mg capsule Take 1 capsule (300 mg) by mouth 2 times a day. 2/20/25   Mone Aquino MD   hydrALAZINE (Apresoline) 50 mg tablet Take 1 tablet (50 mg) by mouth 3 times a day. 5/15/25   Marylin Sparks MD MPH   insulin glargine (Basaglar KwikPen U-100 Insulin) 100 unit/mL (3 mL) pen Inject 15 Units under the skin once daily in the morning. Take as directed per insulin instructions. 1/23/25   TAL Finney-CNP   insulin lispro (Admelog SoloStar U-100 Insulin) 100 unit/mL injection Inject per sliding scale instructions under the skin 3 times a day with meals. (Max daily dose 70 units) 8/20/24   Mone Aquino MD   isosorbide mononitrate ER (Imdur) 30 mg 24 hr tablet Take 1 tablet (30 mg) by mouth once daily. Do not crush or chew. 5/20/25 9/17/25  MD Tamar Luna Delica Plus Lancet 30 gauge misc USE TO TEST BLOOD SUGAR AS DIRECTED 2/27/24   MD Jose Guadalupe LunaTouch Ultra Test strip Use 1 strip BID to check glucose 5/30/24   MD Jose Guadalupe LunaTouch Ultra2 Meter misc use 1 to 2 times daily 2/27/24   Mone Aquino MD   oxyCODONE (Roxicodone) 5 mg immediate release tablet Take 1 tablet (5 mg) by mouth every 4 hours if needed for severe pain (7 - 10) or moderate pain (4 - 6). 3/12/25   Jessica Nation MD   pen needle, diabetic 32 gauge x 5/32\" needle Use four times a day with insulin use 3/25/24   Mone Aquino MD   sodium hypochlorite (Dakin's HALF-Strength) 0.25 % external solution Apply topically 1 time for 1 dose. Use for daily dressing changes for left foot 5/23/25 5/23/25  Ele Zelaya, JUANJOSE   tirzepatide (Mounjaro) 5 mg/0.5 mL pen injector Inject 5 mg under the skin 1 (one) time per week. 5/15/25   Mone Aquino MD   torsemide (Demadex) 20 mg tablet Take 2 tablets (40 mg) by mouth once daily. 5/15/25 7/16/25  Marylin Sparks MD MPH   isosorbide mononitrate ER (Imdur) 30 mg 24 hr " tablet Take 1 tablet (30 mg) by mouth once daily. Do not crush or chew. 2/20/25 5/20/25  Mone Aquino MD     Scheduled medications  Scheduled Medications[4]  Continuous medications  Continuous Medications[5]  PRN medications  PRN Medications[6]    Assessment/Plan   Nuris Squires is a 59 y.o. 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 Zelaya DPRAIEL for osteomyelitis of L foot. Admitted for severe diabetic foot infection, IV antibiotics, and possible surgical management. Podiatry consulted in ER.    #Osteomyelitis of L foot  #Diabetic foot infection, recurrent  -Vanc/Zosyn ordered in ER prior to culture/biopsies, zosyn start at 5:40pm   -needs toxin producing coverage   -favor Linezolid+Zosyn but can add clinda for now.  -blood cultures ordered, collected ~5:35pm  -Podiatry consulted in ER, pending evaluation tonight for TMA emergent tonight vs urgent tomorrow.  - ID consult in AM given osteomyelitis    #HFpEF  -c/w home meds except hold antihypertensives pending surgical planning    #HTN  #T2DM  #HLD  -c/w home meds  -SSI while inpatient, carb controlled diet when not NPO (pending podiatry evaluation for surgical planning)  -hypoglycemia protocol    Vent: RA  Electrolytes: PRN  Lines/Tubes: PIV  Abx: Vanc/Zosyn/Clinda  Drips: None  Diet: NPO pending surgical eval  GI ppx: None  DVT ppx: Lovenox  Code Status: Full Code (confirmed on admission)   NOK:  Primary Emergency Contact: Brandon Rousseau, Home Phone: [phone]  Dispo: Floor    05/24/25 at 5:05 PM - Sky Hyde DO  Internal Medicine-Genetics, PGY-2         [1]   Past Medical History:  Diagnosis Date    Hypertensive heart and kidney disease with HF and with CKD stage IV 01/06/2025    Personal history of other diseases of the circulatory system     History of hypertension    Personal history of other endocrine, nutritional and metabolic disease     History of  hyperlipidemia    Personal history of other endocrine, nutritional and metabolic disease     History of diabetes mellitus   [2]   Past Surgical History:  Procedure Laterality Date    CARDIAC CATHETERIZATION N/A 1/8/2025    Procedure: Right Heart Cath;  Surgeon: Migel Stanton MD;  Location: [place] [address] Cardiac Cath Lab;  Service: Cardiovascular;  Laterality: N/A;    CARDIAC CATHETERIZATION N/A 1/17/2025    Procedure: Right Heart Cath;  Surgeon: Ana Lea MD;  Location: [place] [address] Cardiac Cath Lab;  Service: Cardiovascular;  Laterality: N/A;    CT ANGIO NECK  1/25/2023    CT NECK ANGIO W AND WO IV CONTRAST 1/25/2023 [facility]CTOR [facility]    CT HEAD ANGIO W AND WO IV CONTRAST  1/25/2023    CT HEAD ANGIO W AND WO IV CONTRAST 1/25/2023 DOCTOR OFFICE LEGAC[facility]    OTHER SURGICAL HISTORY  10/21/2020    Toe amputation   [3]   Family History  Problem Relation Name Age of Onset    Alzheimer's disease Mother      Diabetes Mother      Lung cancer Mother      Diabetes Father      Lung cancer Father      Pancreatic cancer Father      Diabetes Sister      Lung cancer Sister     [4] atorvastatin, 40 mg, oral, Nightly  carvedilol, 12.5 mg, oral, BID  [Held by provider] enoxaparin, 30 mg, subcutaneous, q24h  gabapentin, 300 mg, oral, BID  [Held by provider] hydrALAZINE, 50 mg, oral, TID  insulin lispro, 0-5 Units, subcutaneous, TID AC  [Held by provider] isosorbide mononitrate ER, 30 mg, oral, Daily  lidocaine, 5 mL, infiltration, Once  piperacillin-tazobactam, 2.25 g, intravenous, q6h  polyethylene glycol, 17 g, oral, Daily  [Held by provider] torsemide, 40 mg, oral, Daily  vancomycin, 1,000 mg, intravenous, q24h  vitamin B complex-vitamin C-folic acid, 1 capsule, oral, Daily     [5]    [6] PRN medications: dextrose, dextrose, glucagon, glucagon, oxyCODONE, vancomycin

## 2025-05-27 NOTE — ANESTHESIA POSTPROCEDURE EVALUATION
Patient: Nuris Squires    Procedure Summary       Date: 05/27/25 Room / Location: [facility] OR 20 / [facility] OR    Anesthesia Start: 0742 Anesthesia Stop:     Procedures:       DEBRIDEMENT, LOWER EXTREMITY (Left)      TRANSMETATARSAL TMA (Left) Diagnosis:       Left foot infection      Diabetes mellitus due to underlying condition with diabetic peripheral angiopathy and gangrene, with long-term current use of insulin      (Left foot infection [L08.9])      (Diabetes mellitus due to underlying condition with diabetic peripheral angiopathy and gangrene, with long-term current use of insulin [E08.52, Z79.4])    Surgeons: Socrates Ramos DPM Responsible Provider: Enzo Mccarthy MD    Anesthesia Type: general ASA Status: 3            Anesthesia Type: general    Vitals Value Taken Time   /79 05/27/25 09:35   Temp 36.6 05/27/25 09:35   Pulse 66 05/27/25 09:35   Resp 20 05/27/25 09:35   SpO2 99 05/27/25 09:35   Bg 172    Anesthesia Post Evaluation    Patient location during evaluation: PACU  Patient participation: complete - patient participated  Level of consciousness: awake and alert  Pain management: inadequate  Airway patency: patent  Cardiovascular status: acceptable  Respiratory status: acceptable and face mask  Hydration status: acceptable  Postoperative Nausea and Vomiting: none        No notable events documented.

## 2025-05-28 ENCOUNTER — HOME CARE VISIT (OUTPATIENT)
Dept: HOME HEALTH SERVICES | Facility: HOME HEALTH | Age: 60
End: 2025-05-28
Payer: COMMERCIAL

## 2025-05-28 LAB
ALBUMIN SERPL BCP-MCNC: 2.5 G/DL (ref 3.4–5)
ANION GAP SERPL CALC-SCNC: 16 MMOL/L
BACTERIA BLD CULT: NORMAL
BACTERIA BLD CULT: NORMAL
BASOPHILS # BLD AUTO: 0.05 X10*3/UL (ref 0–0.1)
BASOPHILS NFR BLD AUTO: 0.5 %
BUN SERPL-MCNC: 43 MG/DL (ref 6–23)
CALCIUM SERPL-MCNC: 7.5 MG/DL (ref 8.6–10.6)
CHLORIDE SERPL-SCNC: 102 MMOL/L (ref 98–107)
CO2 SERPL-SCNC: 18 MMOL/L (ref 21–32)
CREAT SERPL-MCNC: 5.38 MG/DL (ref 0.5–1.05)
EGFRCR SERPLBLD CKD-EPI 2021: 9 ML/MIN/1.73M*2
EOSINOPHIL # BLD AUTO: 0.41 X10*3/UL (ref 0–0.7)
EOSINOPHIL NFR BLD AUTO: 3.8 %
ERYTHROCYTE [DISTWIDTH] IN BLOOD BY AUTOMATED COUNT: 15.3 % (ref 11.5–14.5)
GLUCOSE BLD MANUAL STRIP-MCNC: 195 MG/DL (ref 74–99)
GLUCOSE BLD MANUAL STRIP-MCNC: 228 MG/DL (ref 74–99)
GLUCOSE BLD MANUAL STRIP-MCNC: 241 MG/DL (ref 74–99)
GLUCOSE BLD MANUAL STRIP-MCNC: 252 MG/DL (ref 74–99)
GLUCOSE SERPL-MCNC: 238 MG/DL (ref 74–99)
HCT VFR BLD AUTO: 25.2 % (ref 36–46)
HGB BLD-MCNC: 7.9 G/DL (ref 12–16)
IMM GRANULOCYTES # BLD AUTO: 0.07 X10*3/UL (ref 0–0.7)
IMM GRANULOCYTES NFR BLD AUTO: 0.6 % (ref 0–0.9)
LYMPHOCYTES # BLD AUTO: 2.02 X10*3/UL (ref 1.2–4.8)
LYMPHOCYTES NFR BLD AUTO: 18.7 %
MCH RBC QN AUTO: 30.3 PG (ref 26–34)
MCHC RBC AUTO-ENTMCNC: 31.3 G/DL (ref 32–36)
MCV RBC AUTO: 97 FL (ref 80–100)
MONOCYTES # BLD AUTO: 0.91 X10*3/UL (ref 0.1–1)
MONOCYTES NFR BLD AUTO: 8.4 %
NEUTROPHILS # BLD AUTO: 7.36 X10*3/UL (ref 1.2–7.7)
NEUTROPHILS NFR BLD AUTO: 68 %
NRBC BLD-RTO: 0 /100 WBCS (ref 0–0)
PHOSPHATE SERPL-MCNC: 6 MG/DL (ref 2.5–4.9)
PLATELET # BLD AUTO: 253 X10*3/UL (ref 150–450)
POTASSIUM SERPL-SCNC: 4.8 MMOL/L (ref 3.5–5.3)
RBC # BLD AUTO: 2.61 X10*6/UL (ref 4–5.2)
SODIUM SERPL-SCNC: 131 MMOL/L (ref 136–145)
VANCOMYCIN SERPL-MCNC: 22.8 UG/ML (ref 5–20)
WBC # BLD AUTO: 10.8 X10*3/UL (ref 4.4–11.3)

## 2025-05-28 PROCEDURE — 1100000001 HC PRIVATE ROOM DAILY

## 2025-05-28 PROCEDURE — 2500000004 HC RX 250 GENERAL PHARMACY W/ HCPCS (ALT 636 FOR OP/ED): Mod: JZ

## 2025-05-28 PROCEDURE — 85025 COMPLETE CBC W/AUTO DIFF WBC: CPT | Performed by: STUDENT IN AN ORGANIZED HEALTH CARE EDUCATION/TRAINING PROGRAM

## 2025-05-28 PROCEDURE — 99232 SBSQ HOSP IP/OBS MODERATE 35: CPT | Performed by: STUDENT IN AN ORGANIZED HEALTH CARE EDUCATION/TRAINING PROGRAM

## 2025-05-28 PROCEDURE — 2500000001 HC RX 250 WO HCPCS SELF ADMINISTERED DRUGS (ALT 637 FOR MEDICARE OP)

## 2025-05-28 PROCEDURE — 82947 ASSAY GLUCOSE BLOOD QUANT: CPT

## 2025-05-28 PROCEDURE — 97165 OT EVAL LOW COMPLEX 30 MIN: CPT | Mod: GO

## 2025-05-28 PROCEDURE — 2500000002 HC RX 250 W HCPCS SELF ADMINISTERED DRUGS (ALT 637 FOR MEDICARE OP, ALT 636 FOR OP/ED)

## 2025-05-28 PROCEDURE — 2500000001 HC RX 250 WO HCPCS SELF ADMINISTERED DRUGS (ALT 637 FOR MEDICARE OP): Performed by: STUDENT IN AN ORGANIZED HEALTH CARE EDUCATION/TRAINING PROGRAM

## 2025-05-28 PROCEDURE — 80069 RENAL FUNCTION PANEL: CPT

## 2025-05-28 PROCEDURE — 97161 PT EVAL LOW COMPLEX 20 MIN: CPT | Mod: GP

## 2025-05-28 PROCEDURE — 80202 ASSAY OF VANCOMYCIN: CPT

## 2025-05-28 RX ORDER — INSULIN LISPRO 100 [IU]/ML
0-10 INJECTION, SOLUTION INTRAVENOUS; SUBCUTANEOUS
Status: DISCONTINUED | OUTPATIENT
Start: 2025-05-29 | End: 2025-05-29

## 2025-05-28 RX ADMIN — GABAPENTIN 300 MG: 300 CAPSULE ORAL at 20:23

## 2025-05-28 RX ADMIN — TORSEMIDE 40 MG: 20 TABLET ORAL at 08:35

## 2025-05-28 RX ADMIN — PIPERACILLIN SODIUM AND TAZOBACTAM SODIUM 2.25 G: 2; .25 INJECTION, SOLUTION INTRAVENOUS at 23:15

## 2025-05-28 RX ADMIN — HYDRALAZINE HYDROCHLORIDE 50 MG: 50 TABLET ORAL at 08:33

## 2025-05-28 RX ADMIN — HYDRALAZINE HYDROCHLORIDE 50 MG: 50 TABLET ORAL at 23:15

## 2025-05-28 RX ADMIN — INSULIN LISPRO 3 UNITS: 100 INJECTION, SOLUTION INTRAVENOUS; SUBCUTANEOUS at 17:47

## 2025-05-28 RX ADMIN — GABAPENTIN 300 MG: 300 CAPSULE ORAL at 08:33

## 2025-05-28 RX ADMIN — PIPERACILLIN SODIUM AND TAZOBACTAM SODIUM 2.25 G: 2; .25 INJECTION, SOLUTION INTRAVENOUS at 17:32

## 2025-05-28 RX ADMIN — ASCORBIC ACID, THIAMINE MONONITRATE,RIBOFLAVIN, NIACINAMIDE, PYRIDOXINE HYDROCHLORIDE, FOLIC ACID, CYANOCOBALAMIN, BIOTIN, CALCIUM PANTOTHENATE, 1 CAPSULE: 100; 1.5; 1.7; 20; 10; 1; 6000; 150000; 5 CAPSULE, LIQUID FILLED ORAL at 08:33

## 2025-05-28 RX ADMIN — CARVEDILOL 12.5 MG: 12.5 TABLET, FILM COATED ORAL at 08:33

## 2025-05-28 RX ADMIN — ISOSORBIDE MONONITRATE 30 MG: 30 TABLET, EXTENDED RELEASE ORAL at 08:33

## 2025-05-28 RX ADMIN — ATORVASTATIN CALCIUM 40 MG: 40 TABLET, FILM COATED ORAL at 20:24

## 2025-05-28 RX ADMIN — PIPERACILLIN SODIUM AND TAZOBACTAM SODIUM 2.25 G: 2; .25 INJECTION, SOLUTION INTRAVENOUS at 05:16

## 2025-05-28 RX ADMIN — CARVEDILOL 12.5 MG: 12.5 TABLET, FILM COATED ORAL at 20:24

## 2025-05-28 RX ADMIN — HYDRALAZINE HYDROCHLORIDE 50 MG: 50 TABLET ORAL at 17:32

## 2025-05-28 RX ADMIN — INSULIN LISPRO 2 UNITS: 100 INJECTION, SOLUTION INTRAVENOUS; SUBCUTANEOUS at 08:35

## 2025-05-28 ASSESSMENT — COGNITIVE AND FUNCTIONAL STATUS - GENERAL
TOILETING: A LOT
TURNING FROM BACK TO SIDE WHILE IN FLAT BAD: A LITTLE
MOVING TO AND FROM BED TO CHAIR: A LITTLE
CLIMB 3 TO 5 STEPS WITH RAILING: TOTAL
MOVING FROM LYING ON BACK TO SITTING ON SIDE OF FLAT BED WITH BEDRAILS: A LITTLE
TOILETING: A LOT
PERSONAL GROOMING: A LITTLE
HELP NEEDED FOR BATHING: A LOT
DAILY ACTIVITIY SCORE: 16
DRESSING REGULAR UPPER BODY CLOTHING: A LITTLE
MOVING FROM LYING ON BACK TO SITTING ON SIDE OF FLAT BED WITH BEDRAILS: A LITTLE
HELP NEEDED FOR BATHING: A LOT
DRESSING REGULAR UPPER BODY CLOTHING: A LOT
STANDING UP FROM CHAIR USING ARMS: A LOT
TURNING FROM BACK TO SIDE WHILE IN FLAT BAD: A LITTLE
EATING MEALS: A LITTLE
DRESSING REGULAR UPPER BODY CLOTHING: A LOT
STANDING UP FROM CHAIR USING ARMS: A LOT
TURNING FROM BACK TO SIDE WHILE IN FLAT BAD: A LITTLE
HELP NEEDED FOR BATHING: A LOT
DRESSING REGULAR LOWER BODY CLOTHING: A LOT
MOBILITY SCORE: 10
STANDING UP FROM CHAIR USING ARMS: TOTAL
PERSONAL GROOMING: A LITTLE
DAILY ACTIVITIY SCORE: 14
MOVING FROM LYING ON BACK TO SITTING ON SIDE OF FLAT BED WITH BEDRAILS: A LITTLE
MOVING TO AND FROM BED TO CHAIR: TOTAL
WALKING IN HOSPITAL ROOM: TOTAL
MOBILITY SCORE: 15
DAILY ACTIVITIY SCORE: 14
TOILETING: A LOT
WALKING IN HOSPITAL ROOM: A LOT
MOBILITY SCORE: 15
PERSONAL GROOMING: A LITTLE
CLIMB 3 TO 5 STEPS WITH RAILING: A LOT
MOVING TO AND FROM BED TO CHAIR: A LITTLE
WALKING IN HOSPITAL ROOM: A LOT
EATING MEALS: A LITTLE
DRESSING REGULAR LOWER BODY CLOTHING: A LOT
CLIMB 3 TO 5 STEPS WITH RAILING: A LOT
DRESSING REGULAR LOWER BODY CLOTHING: A LOT

## 2025-05-28 ASSESSMENT — PAIN SCALES - GENERAL
PAINLEVEL_OUTOF10: 0 - NO PAIN

## 2025-05-28 ASSESSMENT — ACTIVITIES OF DAILY LIVING (ADL)
ADL_ASSISTANCE: INDEPENDENT
BATHING_ASSISTANCE: MODERATE

## 2025-05-28 ASSESSMENT — PAIN - FUNCTIONAL ASSESSMENT
PAIN_FUNCTIONAL_ASSESSMENT: 0-10
PAIN_FUNCTIONAL_ASSESSMENT: 0-10

## 2025-05-28 NOTE — PROGRESS NOTES
Met with patient at bedside to discuss PT recommendations for moderate intensity therapy at discharge. Discussed differences between acute rehab, skilled nursing facility, home health care and outpatient therapy. Patient agreeable to SNF. Educated regarding freedom of choice and provided financial disclosure. Facility list provided. Patient asked for referral to be sent to [facility]. Patient agreeable to review list for additional choices if they are not able to accept. Referral sent via careport. Patient will require precert.  Melissa VICTORIA, RN  Transitional Care Coordinator (TCC)  [phone]

## 2025-05-28 NOTE — PROGRESS NOTES
Occupational Therapy    Evaluation    Patient Name: Nuris Squires  MRN: [id number]  Department: [facility] 3  Room: [address] [place] [address]A  Today's Date: 5/28/2025  Time Calculation  Start Time: 1106  Stop Time: 1124  Time Calculation (min): 18 min    Assessment  IP OT Assessment  OT Assessment: difficulty I/ADLS, safety, fxnl mob  Prognosis: Good  Barriers to Discharge Home: Physical needs, Caregiver assistance  Caregiver Assistance: Caregiver assistance needed per identified barriers - however, level of patient's required assistance exceeds assistance available at home (sig other works)  Physical Needs: 24hr mobility assistance needed, 24hr ADL assistance needed, High falls risk due to function or environment  Evaluation/Treatment Tolerance: Patient limited by fatigue  Medical Staff Made Aware: Yes  End of Session Communication: Bedside nurse  End of Session Patient Position: Bed, 3 rail up, Alarm on  Plan:  Treatment Interventions: ADL retraining, Functional transfer training, Endurance training, Patient/family training, Equipment evaluation/education, Compensatory technique education  OT Frequency: 3 times per week  OT Discharge Recommendations: Moderate intensity level of continued care  Equipment Recommended upon Discharge:  (n/a)  OT Recommended Transfer Status: Assist of 2  OT - OK to Discharge: Yes    Subjective   Current Problem:  1. Left foot infection  Case Request Operating Room: AMPUTATION, FOOT, TRANSMETATARSAL, DEBRIDEMENT, LOWER EXTREMITY    Case Request Operating Room: AMPUTATION, FOOT, TRANSMETATARSAL, DEBRIDEMENT, LOWER EXTREMITY    Surgical Pathology Exam    Surgical Pathology Exam    Fungal Culture/Smear    Fungal Culture/Smear    Tissue/Wound Culture/Smear    Tissue/Wound Culture/Smear    Case Request Operating Room: DEBRIDEMENT, LOWER EXTREMITY, CLOSURE, SURGICAL WOUND, LOWER EXTREMITY, BIOPSY, BONE, LOWER EXTREMITY    Case Request Operating Room: DEBRIDEMENT, LOWER EXTREMITY, CLOSURE, SURGICAL WOUND, LOWER  EXTREMITY, BIOPSY, BONE, LOWER EXTREMITY    Surgical Pathology Exam    Surgical Pathology Exam    Fungal Culture/Smear    Fungal Culture/Smear    Tissue/Wound Culture/Smear    Tissue/Wound Culture/Smear    Post-op shoe    CANCELED: Case Request Operating Room: DEBRIDEMENT, LOWER EXTREMITY, CLOSURE, SURGICAL WOUND, LOWER EXTREMITY, EXCISION, BONE, FOOT    CANCELED: Case Request Operating Room: DEBRIDEMENT, LOWER EXTREMITY, CLOSURE, SURGICAL WOUND, LOWER EXTREMITY, EXCISION, BONE, FOOT      2. Chronic ulcer of left foot with fat layer exposed (Multi)  Case Request Operating Room: AMPUTATION, FOOT, TRANSMETATARSAL, DEBRIDEMENT, LOWER EXTREMITY    Case Request Operating Room: AMPUTATION, FOOT, TRANSMETATARSAL, DEBRIDEMENT, LOWER EXTREMITY    Surgical Pathology Exam    Surgical Pathology Exam    Fungal Culture/Smear    Fungal Culture/Smear    Tissue/Wound Culture/Smear    Tissue/Wound Culture/Smear      3. 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 Diabetes mellitus due to underlying condition with diabetic peripheral angiopathy and gangrene, with long-term current use of insulin  Case Request Operating Room: DEBRIDEMENT, LOWER EXTREMITY, CLOSURE, SURGICAL WOUND, LOWER EXTREMITY, BIOPSY, BONE, LOWER EXTREMITY    Case Request Operating Room: DEBRIDEMENT, LOWER EXTREMITY, CLOSURE, SURGICAL WOUND, LOWER EXTREMITY, BIOPSY, BONE, LOWER EXTREMITY    Surgical Pathology Exam    Surgical Pathology Exam    Fungal Culture/Smear    Fungal Culture/Smear    Tissue/Wound Culture/Smear    Tissue/Wound Culture/Smear      5. Non-pressure chronic ulcer of other part of unspecified foot with fat layer exposed (Multi)   "Post-op shoe        OT Visit Info:  OT Received On: 05/28/25  General Visit Info:  General  Reason for Referral: Left Delayed primary closure with lower extremity debridement  Past Medical History Relevant to Rehab: Left foot infection, is on day 0 of admission. 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 Garcia DPM for osteomyelitis of L foot. Patient followed up with her podiatrist in 5/23 and had xrays concerning for osteomyelitis and possible gas.  Family/Caregiver Present: No  Co-Treatment: PT  Co-Treatment Reason: maximize pt safety  Prior to Session Communication: Bedside nurse  Patient Position Received: Bed, 3 rail up, Alarm on  General Comment: reports prefers [facility] facility post d/c  Precautions:  LE Weight Bearing Status:  (Non Weight Bearing (may use heel for balance with transfers); Ankle/Foot; Left)  Medical Precautions: Fall precautions           Pain:  Pain Assessment  Pain Assessment: 0-10  0-10 (Numeric) Pain Score: 0 - No pain    Objective   Cognition:  Overall Cognitive Status: Within Functional Limits  Orientation Level: Oriented X4  Insight: Within function limits           Home Living:  Type of Home: House  Lives With: Significant other  Home Adaptive Equipment: Walker rolling or standard, Wheelchair-manual, Cane (rollator)  Home Layout: One level  Home Access:  (5 SISSY HR)  Bathroom Shower/Tub: Tub/shower unit  Bathroom Toilet: Standard  Bathroom Equipment:  (shower bench)   Prior Function:  Level of [place]: Independent with ADLs and functional transfers (reports I wheelchair pivot transfers, I cooking seated in wheelchair, A cleaning sig other)  Receives Help From: Family  Vocational: Unemployed  Leisure: dog  Hand Dominance: Right  IADL History:  IADL Comments: A cleaning, I cooking, _dog, A drive  ADL:  Eating Assistance: Independent  Grooming Assistance: Stand by " (anticipated seated)  Bathing Assistance: Moderate (anticipated)  UE Dressing Assistance: Independent  LE Dressing Assistance:  (max A lucia/doff LLE post op shoe EOB)  Toileting Assistance with Device:  (mod Ax2 anticipated BSC)  Functional Deficit: Setup, Steadying, Verbal cueing, Supervision/safety, Increased time to complete  Activity Tolerance:  Endurance:  (fair)  Bed Mobility/Transfers: Bed Mobility  Bed Mobility:  (sup/sit min A extended time for mob)    Transfers  Transfer:  (2x sit to stand max A x2 WhW difficulty with WB LLE)      Functional Mobility:  Functional Mobility  Functional Mobility Performed: No (n)  Sitting Balance:  Dynamic Sitting Balance  Dynamic Sitting-Level of Assistance: Close supervision  Standing Balance:  Dynamic Standing Balance  Dynamic Standing-Level of Assistance: Maximum assistance (x2 WhW)       IADL's:   IADL Comments: A cleaning, I cooking, _dog, A drive  Vision: Vision - Basic Assessment  Current Vision: No visual deficits  Sensation:  Light Touch: No apparent deficits  Strength:  Strength Comments: BUE WFL  Perception:     Coordination:  Movements are Fluid and Coordinated: Yes   Hand Function:  Hand Function  Gross Grasp: Functional  Extremities: RUE   RUE : Within Functional Limits and LUE   LUE: Within Functional Limits      Outcome Measures: [facility] Daily Activity  Putting on and taking off regular lower body clothing: A lot  Bathing (including washing, rinsing, drying): A lot  Putting on and taking off regular upper body clothing: A little  Toileting, which includes using toilet, bedpan or urinal: A lot  Taking care of personal grooming such as brushing teeth: A little  Eating Meals: None  Daily Activity - Total Score: 16         and OT Adult Other Outcome Measures  4AT: -  Modified Barthel Index (Sutton version): Modified Barthel Index (Sutton et al.) = 47 points    ¦ Interpretation: 21-60 Severe dependency  Education Documentation  Body Mechanics, taught by Maty MCCOLLUM  Milady OT at 5/28/2025 12:55 PM.  Learner: Patient  Readiness: Acceptance  Method: Explanation, Demonstration  Response: Verbalizes Understanding, Needs Reinforcement  Comment: tl vargas ADLs    Precautions, taught by Maty Henning OT at 5/28/2025 12:55 PM.  Learner: Patient  Readiness: Acceptance  Method: Explanation, Demonstration  Response: Verbalizes Understanding, Needs Reinforcement  Comment: brandovipul vargas ADLs    ADL Training, taught by Maty Henning OT at 5/28/2025 12:55 PM.  Learner: Patient  Readiness: Acceptance  Method: Explanation, Demonstration  Response: Verbalizes Understanding, Needs Reinforcement  Comment: tl vargas ADLs    Education Comments  No comments found.      Goals:   Encounter Problems       Encounter Problems (Active)       ADLs       Patient will perform UB and LB bathing  with modified independent level of assistance and ae. (Progressing)       Start:  05/28/25    Expected End:  06/18/25            Patient with complete upper body dressing with independent level of assistance  (Progressing)       Start:  05/28/25    Expected End:  06/18/25            Patient with complete lower body dressing with stand by assist level of assistance donning and doffing all LE clothes  with PRN adaptive equipment  (Progressing)       Start:  05/28/25    Expected End:  06/18/25            Patient will complete daily grooming tasks  with independent level of assistance  (Progressing)       Start:  05/28/25    Expected End:  06/18/25            Patient will complete toileting including hygiene clothing management/hygiene with minimal assist  level of assistance and lrd. (Progressing)       Start:  05/28/25    Expected End:  06/18/25               EXERCISE/STRENGTHENING       Patient with increase BUE to wfl strength. (Progressing)       Start:  05/28/25    Expected End:  06/18/25               TRANSFERS       Patient will perform bed mobility independent level of assistance  (Progressing)       Start:   05/28/25    Expected End:  06/18/25            Patient will complete functional transfer least restrictive device with minimal assist  level of assistance. (Progressing)       Start:  05/28/25    Expected End:  06/18/25

## 2025-05-28 NOTE — CARE PLAN
The patient's goals for the shift include Patient lina remain HDS throughout shift    The clinical goals for the shift include Patient will remain safe during shift      Problem: Pain - Adult  Goal: Verbalizes/displays adequate comfort level or baseline comfort level  Outcome: Progressing     Problem: Safety - Adult  Goal: Free from fall injury  Outcome: Progressing     Problem: Discharge Planning  Goal: Discharge to home or other facility with appropriate resources  Outcome: Progressing     Problem: Chronic Conditions and Co-morbidities  Goal: Patient's chronic conditions and co-morbidity symptoms are monitored and maintained or improved  Outcome: Progressing     Problem: Nutrition  Goal: Nutrient intake appropriate for maintaining nutritional needs  Outcome: Progressing     Problem: Diabetes  Goal: Achieve decreasing blood glucose levels by end of shift  Outcome: Progressing  Goal: Increase stability of blood glucose readings by end of shift  Outcome: Progressing  Goal: Decrease in ketones present in urine by end of shift  Outcome: Progressing  Goal: Maintain electrolyte levels within acceptable range throughout shift  Outcome: Progressing  Goal: Maintain glucose levels >70mg/dl to <250mg/dl throughout shift  Outcome: Progressing  Goal: No changes in neurological exam by end of shift  Outcome: Progressing  Goal: Learn about and adhere to nutrition recommendations by end of shift  Outcome: Progressing  Goal: Vital signs within normal range for age by end of shift  Outcome: Progressing  Goal: Increase self care and/or family involovement by end of shift  Outcome: Progressing  Goal: Receive DSME education by end of shift  Outcome: Progressing     Problem: Skin  Goal: Decreased wound size/increased tissue granulation at next dressing change  Outcome: Progressing  Goal: Participates in plan/prevention/treatment measures  Outcome: Progressing  Goal: Prevent/manage excess moisture  Outcome: Progressing  Goal:  Prevent/minimize sheer/friction injuries  Outcome: Progressing  Goal: Promote/optimize nutrition  Outcome: Progressing  Goal: Promote skin healing  Outcome: Progressing     Problem: Fall/Injury  Goal: Not fall by end of shift  Outcome: Progressing  Goal: Be free from injury by end of the shift  Outcome: Progressing  Goal: Verbalize understanding of personal risk factors for fall in the hospital  Outcome: Progressing  Goal: Verbalize understanding of risk factor reduction measures to prevent injury from fall in the home  Outcome: Progressing  Goal: Use assistive devices by end of the shift  Outcome: Progressing  Goal: Pace activities to prevent fatigue by end of the shift  Outcome: Progressing

## 2025-05-28 NOTE — PROGRESS NOTES
Physical Therapy    Physical Therapy Evaluation    Patient Name: Nuris Squires  MRN: [id number]  Department: [place] [address]  Room: [address] [place], [place] [address]  Today's Date: 5/28/2025   Time Calculation  Start Time: 1107  Stop Time: 1124  Time Calculation (min): 17 min    Assessment/Plan   PT Assessment  PT Assessment Results: Decreased strength, Decreased endurance, Impaired balance, Decreased mobility, Orthopedic restrictions, Decreased skin integrity  Rehab Prognosis: Good  Barriers to Discharge Home: Caregiver assistance, Physical needs  Caregiver Assistance: Caregiver assistance needed per identified barriers - however, level of patient's required assistance exceeds assistance available at home  Physical Needs: Stair navigation into home limited by function/safety, Ambulating household distances limited by function/safety, Weight bearing precautions unable to be safely maintained, High falls risk due to function or environment  Evaluation/Treatment Tolerance: Patient tolerated treatment well  Medical Staff Made Aware: Yes  End of Session Communication: Bedside nurse  Assessment Comment: 60yo female presents with dec strength, balance, endurance & NWB status limiting functional mobility. Pt would benefit from continued therapy to address these deficits and improve safety and indep  End of Session Patient Position: Bed, 3 rail up, Alarm on  IP OR SWING BED PT PLAN  Inpatient or Swing Bed: Inpatient  PT Plan  Treatment/Interventions: Therapeutic exercise, Bed mobility, Transfer training, Gait training, Balance training, Strengthening, Endurance training, Therapeutic activity  PT Plan: Ongoing PT  PT Frequency: 3 times per week  PT Discharge Recommendations: Moderate intensity level of continued care  Equipment Recommended upon Discharge:  (tbd)  PT Recommended Transfer Status: Assist x2  PT - OK to Discharge: Yes (PT eval completed & recs made)    Subjective     PT Visit Info:  PT Received On: 05/28/25  General Visit  "Information:  General  Reason for Referral: Left TMA  Past Medical History Relevant to Rehab: PAD, T2DM, HTN, 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 But she is weight-bearing us tolerate it to her right. She may use her heel for balance on transfers\" per Dr Bledsoe)  Patient Position Received: Bed, 3 rail up, Alarm on  General Comment: supine upon arrival, agreeable to therapy assessment  Home Living:  Home Living  Type of Home: House  Lives With: Significant other  Home Adaptive Equipment: Walker rolling or standard, Wheelchair-manual, Cane (rollator)  Home Layout: One level  Home Access: Stairs to enter with rails  Entrance Stairs-Rails: Both  Entrance Stairs-Number of Steps: 5  Bathroom Shower/Tub: Tub/shower unit  Bathroom Equipment: Tub transfer bench  Prior Level of Function:  Prior Function Per Pt/Caregiver Report  Level of [place]: Independent with ADLs and functional transfers  Receives Help From: Family  ADL Assistance: Independent  Ambulatory Assistance:  (indep, though primarily using wheelchair - pivot indep)  Hand Dominance: Right  Precautions:  Precautions  LE Weight Bearing Status: Left Non-Weight Bearing  Medical Precautions: Fall precautions  Braces Applied: post op shoe on L     Objective   Pain:  Pain Assessment  0-10 (Numeric) Pain Score: 0 - No pain  Cognition:  Cognition  Overall Cognitive Status: Within Functional Limits  Orientation Level: Oriented X4    General Assessments:     Activity Tolerance  Endurance: Tolerates 10 - 20 min exercise with multiple rests    Sensation  Light Touch: No apparent deficits    Strength  Strength Comments: grossly >/= 3/5 throughout  Strength  Strength Comments: grossly >/= 3/5 throughout    Perception  Inattention/Neglect: Appears " intact  Initiation: Appears intact  Motor Planning: Appears intact  Perseveration: Not present    Coordination  Movements are Fluid and Coordinated: Yes    Postural Control  Postural Control: Within Functional Limits    Static Sitting Balance  Static Sitting-Balance Support: Feet supported  Static Sitting-Level of Assistance: Close supervision       Functional Assessments:  Bed Mobility  Bed Mobility: Yes  Bed Mobility 1  Bed Mobility 1: Supine to sitting, Sitting to supine  Level of Assistance 1: Minimum assistance  Bed Mobility Comments 1: inc time to complete    Transfers  Transfer: Yes  Transfer 1  Technique 1: Sit to stand, Stand to sit  Transfer Device 1: Walker  Transfer Level of Assistance 1: Maximum assistance, +2, Moderate verbal cues, Maximum tactile cues  Trials/Comments 1: x2 trials, assist to maintain LLE NWB- achieves ~75%  stand each trial    Ambulation/Gait Training  Ambulation/Gait Training Performed: No    Stairs  Stairs: No  Extremity/Trunk Assessments:  RUE   RUE : Within Functional Limits  LUE   LUE: Within Functional Limits  RLE   RLE : Within Functional Limits  LLE   LLE :  (grossly WFL except foot/ankle NT)  Outcome Measures:  [facility] Basic Mobility  Turning from your back to your side while in a flat bed without using bedrails: A little  Moving from lying on your back to sitting on the side of a flat bed without using bedrails: A little  Moving to and from bed to chair (including a wheelchair): Total  Standing up from a chair using your arms (e.g. wheelchair or bedside chair): Total  To walk in hospital room: Total  Climbing 3-5 steps with railing: Total  Basic Mobility - Total Score: 10    Encounter Problems       Encounter Problems (Active)       Mobility       STG - Patient will ambulate >5' with WW and Casey, maintaining NWB LLE (Not Progressing)       Start:  05/28/25    Expected End:  06/11/25               PT Transfers       STG - Transfer from bed to chair with Casey and WW  (Progressing)       Start:  05/28/25    Expected End:  06/11/25            STG - Patient to transfer to and from sit to supine indep from flat bed, no rails  (Progressing)       Start:  05/28/25    Expected End:  06/11/25            STG - Patient will transfer sit to and from stand with Casey and WW (Progressing)       Start:  05/28/25    Expected End:  06/11/25               Pain - Adult              Education Documentation  Precautions, taught by Jing Reardon, PT at 5/28/2025  3:38 PM.  Learner: Patient  Readiness: Acceptance  Method: Explanation  Response: Verbalizes Understanding  Comment: falls prec, NWB, progression of therapy    Mobility Training, taught by Jing Reardon, PT at 5/28/2025  3:38 PM.  Learner: Patient  Readiness: Acceptance  Method: Explanation  Response: Verbalizes Understanding  Comment: falls prec, NWB, progression of therapy    Education Comments  No comments found.        05/28/25 at 3:39 PM - Jing Reardon, PT

## 2025-05-28 NOTE — PROGRESS NOTES
Nuris Squires is a 59 y.o. female on day 4 of admission presenting with Left foot infection.        Objective     Last Recorded Vitals  /56   Pulse 70   Temp 36.5 °C (97.7 °F)   Resp 16   Wt 102 kg (225 lb)   SpO2 97%   Intake/Output last 3 Shifts:    Intake/Output Summary (Last 24 hours) at 5/28/2025 1900  Last data filed at 5/28/2025 0647  Gross per 24 hour   Intake --   Output 614 ml   Net -614 ml       Admission Weight  Weight: 102 kg (225 lb) (05/24/25 1307)    Daily Weight  05/24/25 : 102 kg (225 lb)    Image Results  Bedside Midline Imaging  These images are not reportable by radiology and will not be interpreted   by  Radiologists.  MR foot left w and wo IV contrast  Narrative: Interpreted By:  Randal Garcia  and Tre Bergeron   STUDY:  MRI of the left forefoot without and with IV contrast;      INDICATION:  Signs/Symptoms:Osteo L foot          COMPARISON:  Same day foot CT      ACCESSION NUMBER(S):  [id number]      ORDERING CLINICIAN:  NGA CASTILLO      TECHNIQUE:  Multiplanar multisequence MRI of the  left forefoot was performed  without and with IV contrast      FINDINGS:  Postsurgical changes from prior 1st digit interphalangeal and 2nd  digit MTP joint amputations are seen.      Soft tissues: There is a plantar soft tissue ulcer at the level of  the  4th metatarsophalangeal joint with associated subjacent soft  tissue edema and enhancement, suggestive of cellulitis. Questionable  fluid collection versus confluent edema is seen in the plantar soft  tissues at the level of the 3rd mid metatarsal measuring 1.0 x 0.4 x  1.0 cm.      Bones:  There is STIR hyperintensity and loss of normal T1 marrow  signal involving approximate of the distal half of the 4th metatarsal  and the majority of the 4th proximal phalanx with associated osseous  irregularity at the joint. There is abnormal marrow signal the 4th  middle phalanx on STIR images. Additionally there is STIR  hyperintensity of the 5th  proximal and middle phalanges with subtle  loss of normal T1 marrow signal (series 8, image 21). There is marrow  edema of the 5th metatarsal head. Mild patchy marrow edema involving  the base of the 3rd proximal phalanx. Likely pathologic fracture of  the 4th metatarsal neck.      Muscles:  Edema and atrophy of the plantar foot musculature,  compatible with diabetic myopathy and/or myositis.      Miscellaneous:  Visualized tendons and Lisfranc ligament are grossly  intact.      Impression: 1. Osteomyelitis of the 4th metatarsal, 4th proximal phalanx, and  high likelihood of osteomyelitis of the 4th middle phalanx, as above.  Pathologic fracture of 4th metatarsal neck.  2. Osteomyelitis of the 5th proximal and middle phalanges, as above.  High likelihood of osteolysis of the 5th metatarsal head.  3. High likelihood of osteomyelitis 3rd proximal phalangeal base.  4. Multifocal soft tissue ulceration, cellulitis, and likely small  subcutaneous abscess, as above.  5. Postsurgical changes from prior 1st digit interphalangeal and 2nd  digit MTP joint amputations          I personally reviewed the images/study and I agree with the resident  Rubio Toledo's findings as stated. This study was interpreted  at [place], [place].      MACRO:  None      Signed by: Randal Garcia 5/25/2025 7:44 AM  Dictation workstation:   [id number]      Physical Exam  Constitutional: Pleasant elderly female, alert active, cooperative not in acute distress  Eyes: PERRLA, clear sclera  ENMT: Moist mucosal membranes, no exudate  Head / Neck: Atraumatic, normocephalic, supple neck, JVP not visualized  Lungs: Patent airways, CTABL  Heart: RRR, S1S2, no murmurs appreciated, palpable pulses in all extremities  GI: Soft, NT, ND, bowel sounds present in all quadrants  MSK: Moves all extremities freely, no restriction  of ROM, no joint edema  Extremities: Left foot covered with dressing dry and intact,   Midline insertion in the right arm, no peripheral edema  : No Louis catheter inserted  Breast: Deferred  Neurological: AAO x 3 to person, place and date, facial muscles symmetrical, sensation intact, strength 4/4, no acute focal neurological deficits appreciated  Psychological: Appropriate mood and behavior    Relevant Results                Scheduled medications  Scheduled Medications[1]  Continuous medications  Continuous Medications[2]  PRN medications  PRN Medications[3]    Assessment & Plan  Left foot infection    Chronic ulcer of left foot with fat layer exposed (Multi)    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      -----------------------------  AP    Nuris Squires is a 59 y.o. 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  Osteomyelitis of the left foot, status post left 4th and 5th digit metatarsal amputation and washout, podiatry following.  Discharge pending result of tissue culture to determine if Discharge pending result of tissue culture to determine if antibiotic necessary.        5/28  -Discontinue clindamycin, Discussed with ID team. Pending final recommendations from ID team.  -Wound care instruction requested: betadine soaked Adaptic, 4 x 4's ABD pad, kerlix, ace wrap. Pt will follow with Dr. Uribe at [facility] [place], [place] on Monday, appt is already scheduled  -Hyperglycemic, escalated ISS#2.   -Dispo SNF  TCC was updated   -Continue vancomycin and Zosyn   -Weaned off to RA          5/27  #S/P LLE I&D  -Post op shoe to the left foot requested   -ID consulted to determine abx duration.  -She would like to know  her options for nursing home instead of going home. PTOT requested.   -Reports  pain is controlled.  -Will resume heparin tomorrow if blood counts stable and bleeding controlled.     #Hypertensive this PM, resumed torsemide, hydralazine and imdur. Will reassess BP.     Time 25 min       -------------------------  Previous course      #Osteomyelitis of L foot  #Diabetic gas forming infection of left foot   #Status post podiatry intervention with  L 4th and 5th metatarsal amputation and washout  on 5/25  -Wound cultures sent    -Clindamycin discontinued postoperatively  -Zosyn 2.25 g IV piggyback every 6 hours, vancomycin 1000 mg IV piggyback daily  -blood cultures 5/24  -Podiatry consulted: s/p left foot 4th and 5th digit amputation, scheduled for Left foot 5/27:  Delayed Primary Closure, Bone cortex Excision, Reverse Sural Flap 5/27/25.   - ID consulted for further information on culutres  - pt/ot, mobilize pt as tolerated, NWB to LLE.        Acute on chronic anemia. Stable   - 2/2 to operative bleeding  - AM lab to reassess     #CKD5  Stable    HFpEF/HTN  - compensated  - continue carvedilol  - Home imdur, hydralazine, torsemide     T2DM  -SSI while inpatient, carb controlled diet   -hypoglycemia protocol      Diet  - Diabetic    DVT prophylaxis  - Omission of heparin anticoagulation due to acute on chronic anemia in the setting of surgical procedure.  Continue holding Lovenox 30 mg subcu daily    Disposition: Presented with osteomyelitis of the left foot, need further management, final ID recommendation pending to see culture result and ID consult for outpatient IV antibiotics.    ADOD in 2 days      Mikala Escoto MD           [1] atorvastatin, 40 mg, oral, Nightly  carvedilol, 12.5 mg, oral, BID  [Held by provider] enoxaparin, 30 mg, subcutaneous, q24h  gabapentin, 300 mg, oral, BID  hydrALAZINE, 50 mg, oral, TID  [START ON 5/29/2025] insulin lispro, 0-10 Units, subcutaneous, TID AC  isosorbide mononitrate  ER, 30 mg, oral, Daily  lidocaine, 5 mL, infiltration, Once  piperacillin-tazobactam, 2.25 g, intravenous, q6h  polyethylene glycol, 17 g, oral, Daily  torsemide, 40 mg, oral, Daily  vitamin B complex-vitamin C-folic acid, 1 capsule, oral, Daily     [2]    [3] PRN medications: dextrose, dextrose, glucagon, glucagon, oxyCODONE, vancomycin

## 2025-05-28 NOTE — PROGRESS NOTES
PODIATRY PROGRESS NOTE     SERVICE DATE: 5/28/2025       SERVICE TIME:  12:31 PM         Consults   PRIMARY CARE PHYSICIAN: Mone Aquino MD     Subjective  S/P Left Open TMA POD#3 (DOS: 5/25/25) and Left Delayed primary closure with lower extremity debridement POD#1 (5/27/25) with Dr. Ramos   Pt seen at bedside.  No overnight events.  Pt admits to minimal pain to operative extremity.  Has been elevating foot on pillow.  Pt denies any constitutional symptoms.   No other pedal complaints at this time.     HPI: Ms. Squires is a 59 y.o. female  who presents for Left foot infection, is on day 0 of admission. 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 Garcia DPM for osteomyelitis of L foot. Patient followed up with her podiatrist in 5/23 and had xrays concerning for osteomyelitis and possible gas. Patient endorses 5/10 sharp shooting pain to her left lower extremity. Patient did not know she had a wound on the bottom of her foot until seeing her podiatrist and thus does not know how much drainage she has had. Patient denies being on PO abx. Discussed with patient emergent need for a Left TMA. Patient agreeable. Discussed postoperative course with the patient.  Patient denies any constitutional symptoms. No other pedal complaints.      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 01/06/2025    Personal history of other diseases of the circulatory system       History of hypertension    Personal history of other endocrine, nutritional and metabolic disease       History of hyperlipidemia    Personal history of other endocrine, nutritional and metabolic disease       History of diabetes mellitus     [Surgical History]    [Surgical History]  Past  Surgical History        Procedure Laterality Date    CARDIAC CATHETERIZATION N/A 1/8/2025     Procedure: Right Heart Cath;  Surgeon: Migel Stanton MD;  Location: [place] [address] Cardiac Cath Lab;  Service: Cardiovascular;  Laterality: N/A;    CARDIAC CATHETERIZATION N/A 1/17/2025     Procedure: Right Heart Cath;  Surgeon: Ana Lea MD;  Location: [place] [address] Cardiac Cath Lab;  Service: Cardiovascular;  Laterality: N/A;    CT ANGIO NECK   1/25/2023     CT NECK ANGIO W AND WO IV CONTRAST 1/25/2023 DOCTOR OFFICE LEGACY    CT HEAD ANGIO W AND WO IV CONTRAST   1/25/2023     CT HEAD ANGIO W AND WO IV CONTRAST 1/25/2023 DOCTOR OFFICE LEGACY    OTHER SURGICAL HISTORY   10/21/2020     Toe amputation     [Family History]    [Family History]         Problem Relation Name Age of Onset    Alzheimer's disease Mother        Diabetes Mother        Lung cancer Mother        Diabetes Father        Lung cancer Father        Pancreatic cancer Father        Diabetes Sister        Lung cancer Sister         [Social History]     [Social History]        Tobacco Use    Smoking status: Never       Passive exposure: Never    Smokeless tobacco: Never   Vaping Use    Vaping status: Never Used   Substance Use Topics    Alcohol use: Yes    Drug use: Never     [Prescriptions Prior to Admission]             [Prescriptions Prior to Admission]  (Not in a hospital admission)  [Allergies]     [Allergies]  No Known Allergies     Medications:  [Scheduled Medications]    [Scheduled Medications]  atorvastatin, 40 mg, oral, Nightly  carvedilol, 12.5 mg, oral, BID  clindamycin, 600 mg, intravenous, q8h  enoxaparin, 30 mg, subcutaneous, q24h  gabapentin, 300 mg, oral, BID  [Held by provider] hydrALAZINE, 50 mg, oral, TID  [Held by provider] isosorbide mononitrate ER, 30 mg, oral, Daily  polyethylene glycol, 17 g, oral, Daily  [Held by provider] torsemide, 40 mg, oral, Daily  vancomycin, 1,000 mg, intravenous, q24h  [START ON 5/25/2025] vitamin B  complex-vitamin C-folic acid, 1 capsule, oral, Daily  [Continuous Medications]    [Continuous Medications]     [PRN Medications]    [PRN Medications]  PRN medications: dextrose, dextrose, glucagon, glucagon, oxyCODONE, vancomycin         Allergies as of 05/24/2025    (No Known Allergies)               Objective  PHYSICAL EXAM:      Vitals:     05/24/25 1830   BP: (!) 188/90   Pulse: 80   Resp: 17   Temp:     SpO2:        Body mass index is 41.15 kg/m².     Patient is AOx3 and in no acute distress. Patient is alert and cooperative. Sitting comfortably in bed with dressing clean, dry and intact.      Vascular: Palpable DP/PT pulses B/L. Moderate pitting edema noted B/L. Hair growth absent B/L. CFT<5 to B/L hallux. Temperature is warm to cool from tibial tuberosity to distal digits B/L. No lymphatic streaking noted B/L.     Musculoskeletal: Gross active and passive ROM intact to age and activity level. Moves all extremities spontaneously. No pain to palpation at feet B/L.      Neurological: Intact light touch sensation B/L. Pain stimuli diminished B/L. Denies any numbness, burning or tingling.     Dermatologic: 1-5 R are within normal limits for thickness and length B/L. Skin appears diffusely xerotic B/L.    Wound: Left TMA Site   Incision site is well coapted with retention sutures and surgical staples.   Mild serosanguinous drainage with fibrotic slough.   Mild selene-wound maceration.   Mild selene-wound erythema.   No evidence of ascending cellulitis or lymphangitis.  No  palpable fluctuance. No malodor. No increased warmth.   No probe to bone or deep structures within the wound base.   No undermining. Skin edges irregular but intact.     LABS:         Lab Results   Component Value Date     HGBA1C 7.3 (A) 05/20/2025            Lab Results   Component Value Date     CRP 2.21 (H) 05/24/2025            Lab Results   Component Value Date     SEDRATE 36 (H) 02/05/2025              Results from last 7 days   Lab Units  05/24/25  1327   WBC AUTO x10*3/uL 6.0   RBC AUTO x10*6/uL 2.80*   HEMOGLOBIN g/dL 8.1*   HEMATOCRIT % 25.6*           Results from last 7 days   Lab Units 05/24/25  1327   GLUCOSE mg/dL 265*   SODIUM mmol/L 136   POTASSIUM mmol/L 4.4   CHLORIDE mmol/L 104   CO2 mmol/L 23   BUN mg/dL 29*   CREATININE mg/dL 2.46*   CALCIUM mg/dL 8.5*   BILIRUBIN TOTAL mg/dL 0.4   ALT U/L 9   AST U/L 13         Cultures  No results found for the last 90 days.        IMAGING REVIEW:  XR foot left 3+ views 05/23/2025     Narrative  Interpreted By:  Raymundo Early,  STUDY:  XR FOOT LEFT 3+ VIEWS     INDICATION:  Signs/Symptoms:foot infection.     COMPARISON:  October 9, 2020     ACCESSION NUMBER(S):  [id number]     ORDERING CLINICIAN:  ANURAG BRUNNER     FINDINGS:  Postsurgical changes of 1st and 2nd digit amputation.     New bony erosive changes at the 4th proximal phalanx and metatarsal  head highly concerning for osteomyelitis.     Marked soft tissue swelling with possible soft tissue gas which could  be gas producing soft tissue infection.     Impression  New bony erosive changes at the 4th proximal phalanx and metatarsal  head highly concerning for osteomyelitis.     Marked soft tissue swelling with possible soft tissue gas which could  be gas producing soft tissue infection.     MACRO:  Critical Finding:  See findings. Notification was initiated on  5/24/2025 at 8:25 am by  Raymundo Early.  (**-OCF-**)     Signed by: Raymundo Early 5/24/2025 8:25 AM  Dictation workstation:   [id number]        Vascular   No results found for this or any previous visit from the past 365 days.              Assessment/Plan  ASSESSMENT & PLAN:  Shaw is a 59 y.o. female  who presents for Left foot infection, is on day 0 of admission.     #Osteomyelitis of L foot  #Diabetic foot infection, recurrent  #T2DM   S/P Left Open TMA POD#3 (DOS: 5/25/25) and Left Delayed primary closure with lower extremity debridement POD#1 (5/27/25) with Dr. Ramos   - Patient was  seen and evaluated; all findings were discussed and all questions were answered to patient's satisfaction.  - Charts, labs, vitals and imaging all reviewed.   - Imaging:   Xray left foot:   New bony erosive changes at the 4th proximal phalanx and metatarsal  head highly concerning for osteomyelitis.  Marked soft tissue swelling with possible soft tissue gas which could  be gas producing soft tissue infection.  MRI Left Foot:   1. Osteomyelitis of the 4th metatarsal, 4th proximal phalanx, and  high likelihood of osteomyelitis of the 4th middle phalanx, as above.  Pathologic fracture of 4th metatarsal neck.  2. Osteomyelitis of the 5th proximal and middle phalanges, as above.  High likelihood of osteolysis of the 5th metatarsal head.  3. High likelihood of osteomyelitis 3rd proximal phalangeal base.  4. Multifocal soft tissue ulceration, cellulitis, and likely small  subcutaneous abscess, as above.  5. Postsurgical changes from prior 1st digit interphalangeal and 2nd  digit MTP joint amputations  Plan:  - Antibiotics: Per Primary team/ID  - Patient surgical incision is progressing well.   - Dressings:  betadine soaked  4x4's Kerlix, ACE wrap  - Nursing staff is able to change/reinforce dressing if & as necessary until next dressing change. Thank you.  - Patient is optimized for discharge from podiatry's standpoint.  - Podiatry will continue to follow while in house.  - Follow-up with Dr. Andria Uribe at [address] [place] [address] next Monday 6/2/25. DVT ppx: Per Primary team  Weightbearing: NWB LLE WBAT RLE  Discharge: Pt to follow up 1 week after discharge with Dr. Ramos     Patient evaluated/discussed with attending Dr. Richard SOW     Case to be discussed with attending, A&P above reflects a tentative plan. Please await for the final signature from the attending physician on service.     Barrington Llanos DPM PGY-3  Podiatric Medicine & Surgery  [id number]  Epic Haiku chat preferred      I saw and evaluated the patient. I  personally obtained the key and critical portions of the history and physical exam or was physically present for key and critical portions performed by the resident/fellow. I reviewed the resident/fellow's documentation and discussed the patient with the resident/fellow. I agree with the resident/fellow's medical decision making as documented in the note.    I spent 30 minutes in the professional and overall care of this patient.    Socrates Ramos DPM

## 2025-05-28 NOTE — PROGRESS NOTES
Vancomycin Dosing by Pharmacy- FOLLOW UP    Nuris Squires is a 59 y.o. year old female who Pharmacy has been consulted for vancomycin dosing for osteomyelitis/septic arthritis. Based on the patient's indication and renal status this patient is being dosed based on a goal trough/random level of 15-20.     Renal function is currently declining.    Current vancomycin dose: 1000 mg given     Most recent random level: 22.8 mcg/mL    Visit Vitals  /52   Pulse 80   Temp 37.1 °C (98.8 °F) (Temporal)   Resp 16        Lab Results   Component Value Date    CREATININE 5.38 (H) 2025    CREATININE 4.62 (H) 2025    CREATININE 3.60 (H) 2025    CREATININE 2.46 (H) 2025    CREATININE 2.44 (H) 2025        Patient weight is as follows:   Vitals:    25 1307   Weight: 102 kg (225 lb)       Cultures:  No results found for the encounter in last 14 days.       I/O last 3 completed shifts:  In: 1045 (10.2 mL/kg) [I.V.:45 (0.4 mL/kg); Blood:350; IV Piggyback:650]  Out: 964 (9.4 mL/kg) [Urine:914 (0.2 mL/kg/hr); Blood:50]  Weight: 102.1 kg   I/O during current shift:  No intake/output data recorded.    Temp (24hrs), Av.5 °C (97.7 °F), Min:36.2 °C (97.2 °F), Max:37.1 °C (98.8 °F)      Assessment/Plan    Patient with worsening renal function (VENKAT on CKD5) leading to a prolonged half life for vanco and slow drug clearance. Will continue to hold doses for today.    The next level will be obtained on  at 0500. May be obtained sooner if clinically indicated.   Will continue to monitor renal function daily while on vancomycin and order serum creatinine at least every 48 hours if not already ordered.  Follow for continued vancomycin needs, clinical response, and signs/symptoms of toxicity.       Odette Squires, PharmD   [facility] Pharmacist

## 2025-05-28 NOTE — DISCHARGE INSTRUCTIONS
PODIATRY DISCHARGE INSTRUCTIONS  Please call to schedule appointment with Dr. Uribe for Monday 6/2/25 at [facility] [place] [address] after discharge or sooner if any problems or questions arise.  Please remain non-weight bearing to the surgical extremity with Dressing in place  in place utilizing surgical shoe.  Keep dressing clean and dry until your first follow up appointment.  Do not shower unless using a cast protector to protect dressing.  If dressing gets wet, change or call office immediately as this can lead to increased risk of infection.  Place ice pack behind knee of extremity.  Elevate surgical extremity as much as possible to help with pain and swelling.  In the harvinder that the dressing does become wet dress with Betadine soaked adaptic, 4x4's, ABD pad, Krelix, ACE wrap.   Should any problems, questions, or concerns arise, please call the clinic office.    progression of your kidney disease over the last year, you are at increased risk for complete kidney failure and need for dialysis in the future, thus it is important that you follow up closely with a nephrologist.

## 2025-05-28 NOTE — CARE PLAN
The patient's goals for the shift include relax    The clinical goals for the shift include Patient will remain safe during shift    Problem: Skin  Goal: Decreased wound size/increased tissue granulation at next dressing change  Flowsheets (Taken 5/28/2025 1824)  Decreased wound size/increased tissue granulation at next dressing change: Promote sleep for wound healing

## 2025-05-29 LAB
ALBUMIN SERPL BCP-MCNC: 2.7 G/DL (ref 3.4–5)
ANION GAP SERPL CALC-SCNC: 17 MMOL/L (ref 10–20)
APPEARANCE UR: ABNORMAL
BACTERIA #/AREA URNS AUTO: ABNORMAL /HPF
BACTERIA SPEC AEROBE CULT: ABNORMAL
BACTERIA SPEC ANAEROBE CULT: ABNORMAL
BACTERIA SPEC CULT: NORMAL
BASOPHILS # BLD AUTO: 0.04 X10*3/UL (ref 0–0.1)
BASOPHILS NFR BLD AUTO: 0.4 %
BILIRUB UR STRIP.AUTO-MCNC: NEGATIVE MG/DL
BUN SERPL-MCNC: 45 MG/DL (ref 6–23)
C3 SERPL-MCNC: 116 MG/DL (ref 87–200)
C4 SERPL-MCNC: 21 MG/DL (ref 10–50)
CALCIUM SERPL-MCNC: 8 MG/DL (ref 8.6–10.6)
CHLORIDE SERPL-SCNC: 103 MMOL/L (ref 98–107)
CHLORIDE UR-SCNC: 31 MMOL/L
CHLORIDE/CREATININE (MMOL/G) IN URINE: 37 MMOL/G CREAT (ref 38–318)
CO2 SERPL-SCNC: 19 MMOL/L (ref 21–32)
COLOR UR: ABNORMAL
CREAT SERPL-MCNC: 6.35 MG/DL (ref 0.5–1.05)
CREAT UR-MCNC: 83.2 MG/DL (ref 20–320)
CREAT UR-MCNC: 83.2 MG/DL (ref 20–320)
EGFRCR SERPLBLD CKD-EPI 2021: 7 ML/MIN/1.73M*2
EOSINOPHIL # BLD AUTO: 0.39 X10*3/UL (ref 0–0.7)
EOSINOPHIL NFR BLD AUTO: 4 %
ERYTHROCYTE [DISTWIDTH] IN BLOOD BY AUTOMATED COUNT: 15.1 % (ref 11.5–14.5)
GLUCOSE BLD MANUAL STRIP-MCNC: 211 MG/DL (ref 74–99)
GLUCOSE BLD MANUAL STRIP-MCNC: 232 MG/DL (ref 74–99)
GLUCOSE BLD MANUAL STRIP-MCNC: 238 MG/DL (ref 74–99)
GLUCOSE SERPL-MCNC: 171 MG/DL (ref 74–99)
GLUCOSE UR STRIP.AUTO-MCNC: ABNORMAL MG/DL
GRAM STN SPEC: NORMAL
GRAM STN SPEC: NORMAL
HCT VFR BLD AUTO: 27.9 % (ref 36–46)
HGB BLD-MCNC: 8.2 G/DL (ref 12–16)
IMM GRANULOCYTES # BLD AUTO: 0.04 X10*3/UL (ref 0–0.7)
IMM GRANULOCYTES NFR BLD AUTO: 0.4 % (ref 0–0.9)
KETONES UR STRIP.AUTO-MCNC: NEGATIVE MG/DL
LEUKOCYTE ESTERASE UR QL STRIP.AUTO: ABNORMAL
LYMPHOCYTES # BLD AUTO: 2.02 X10*3/UL (ref 1.2–4.8)
LYMPHOCYTES NFR BLD AUTO: 20.6 %
MCH RBC QN AUTO: 29.6 PG (ref 26–34)
MCHC RBC AUTO-ENTMCNC: 29.4 G/DL (ref 32–36)
MCV RBC AUTO: 101 FL (ref 80–100)
MICROALBUMIN UR-MCNC: 1223.9 MG/L
MICROALBUMIN/CREAT UR: 1471 UG/MG CREAT
MONOCYTES # BLD AUTO: 0.79 X10*3/UL (ref 0.1–1)
MONOCYTES NFR BLD AUTO: 8.1 %
MUCOUS THREADS #/AREA URNS AUTO: ABNORMAL /LPF
NEUTROPHILS # BLD AUTO: 6.52 X10*3/UL (ref 1.2–7.7)
NEUTROPHILS NFR BLD AUTO: 66.5 %
NITRITE UR QL STRIP.AUTO: NEGATIVE
NRBC BLD-RTO: 0 /100 WBCS (ref 0–0)
PH UR STRIP.AUTO: 5.5 [PH]
PHOSPHATE SERPL-MCNC: 7.2 MG/DL (ref 2.5–4.9)
PLATELET # BLD AUTO: 275 X10*3/UL (ref 150–450)
POTASSIUM SERPL-SCNC: 4.9 MMOL/L (ref 3.5–5.3)
POTASSIUM UR-SCNC: 28 MMOL/L
POTASSIUM/CREAT UR-RTO: 34 MMOL/G CREAT
PROT UR STRIP.AUTO-MCNC: ABNORMAL MG/DL
RBC # BLD AUTO: 2.77 X10*6/UL (ref 4–5.2)
RBC # UR STRIP.AUTO: ABNORMAL MG/DL
RBC #/AREA URNS AUTO: >20 /HPF
SODIUM SERPL-SCNC: 134 MMOL/L (ref 136–145)
SODIUM UR-SCNC: 36 MMOL/L
SODIUM/CREAT UR-RTO: 43 MMOL/G CREAT
SP GR UR STRIP.AUTO: 1.01
SQUAMOUS #/AREA URNS AUTO: ABNORMAL /HPF
UROBILINOGEN UR STRIP.AUTO-MCNC: NORMAL MG/DL
VANCOMYCIN SERPL-MCNC: 20.9 UG/ML (ref 5–20)
WBC # BLD AUTO: 9.8 X10*3/UL (ref 4.4–11.3)
WBC #/AREA URNS AUTO: >50 /HPF
WBC CLUMPS #/AREA URNS AUTO: ABNORMAL /HPF
YEAST BUDDING #/AREA UR COMP ASSIST: PRESENT /HPF

## 2025-05-29 PROCEDURE — 2500000004 HC RX 250 GENERAL PHARMACY W/ HCPCS (ALT 636 FOR OP/ED): Performed by: STUDENT IN AN ORGANIZED HEALTH CARE EDUCATION/TRAINING PROGRAM

## 2025-05-29 PROCEDURE — 99232 SBSQ HOSP IP/OBS MODERATE 35: CPT | Performed by: STUDENT IN AN ORGANIZED HEALTH CARE EDUCATION/TRAINING PROGRAM

## 2025-05-29 PROCEDURE — 99232 SBSQ HOSP IP/OBS MODERATE 35: CPT | Performed by: INTERNAL MEDICINE

## 2025-05-29 PROCEDURE — 2500000002 HC RX 250 W HCPCS SELF ADMINISTERED DRUGS (ALT 637 FOR MEDICARE OP, ALT 636 FOR OP/ED): Performed by: STUDENT IN AN ORGANIZED HEALTH CARE EDUCATION/TRAINING PROGRAM

## 2025-05-29 PROCEDURE — 81001 URINALYSIS AUTO W/SCOPE: CPT | Performed by: STUDENT IN AN ORGANIZED HEALTH CARE EDUCATION/TRAINING PROGRAM

## 2025-05-29 PROCEDURE — 82436 ASSAY OF URINE CHLORIDE: CPT | Performed by: STUDENT IN AN ORGANIZED HEALTH CARE EDUCATION/TRAINING PROGRAM

## 2025-05-29 PROCEDURE — 86160 COMPLEMENT ANTIGEN: CPT | Performed by: STUDENT IN AN ORGANIZED HEALTH CARE EDUCATION/TRAINING PROGRAM

## 2025-05-29 PROCEDURE — 1100000001 HC PRIVATE ROOM DAILY

## 2025-05-29 PROCEDURE — 85025 COMPLETE CBC W/AUTO DIFF WBC: CPT | Performed by: STUDENT IN AN ORGANIZED HEALTH CARE EDUCATION/TRAINING PROGRAM

## 2025-05-29 PROCEDURE — 2500000001 HC RX 250 WO HCPCS SELF ADMINISTERED DRUGS (ALT 637 FOR MEDICARE OP): Performed by: STUDENT IN AN ORGANIZED HEALTH CARE EDUCATION/TRAINING PROGRAM

## 2025-05-29 PROCEDURE — 82947 ASSAY GLUCOSE BLOOD QUANT: CPT

## 2025-05-29 PROCEDURE — 2500000001 HC RX 250 WO HCPCS SELF ADMINISTERED DRUGS (ALT 637 FOR MEDICARE OP)

## 2025-05-29 PROCEDURE — 2500000004 HC RX 250 GENERAL PHARMACY W/ HCPCS (ALT 636 FOR OP/ED): Mod: JZ

## 2025-05-29 PROCEDURE — 84132 ASSAY OF SERUM POTASSIUM: CPT

## 2025-05-29 PROCEDURE — 80202 ASSAY OF VANCOMYCIN: CPT

## 2025-05-29 PROCEDURE — 36415 COLL VENOUS BLD VENIPUNCTURE: CPT | Performed by: STUDENT IN AN ORGANIZED HEALTH CARE EDUCATION/TRAINING PROGRAM

## 2025-05-29 PROCEDURE — 82043 UR ALBUMIN QUANTITATIVE: CPT | Performed by: STUDENT IN AN ORGANIZED HEALTH CARE EDUCATION/TRAINING PROGRAM

## 2025-05-29 RX ORDER — NYSTATIN 100000 [USP'U]/G
1 POWDER TOPICAL 2 TIMES DAILY
Status: DISCONTINUED | OUTPATIENT
Start: 2025-05-29 | End: 2025-06-05 | Stop reason: HOSPADM

## 2025-05-29 RX ORDER — HEPARIN SODIUM 5000 [USP'U]/ML
7500 INJECTION, SOLUTION INTRAVENOUS; SUBCUTANEOUS EVERY 8 HOURS
Status: DISCONTINUED | OUTPATIENT
Start: 2025-05-29 | End: 2025-06-05 | Stop reason: HOSPADM

## 2025-05-29 RX ORDER — INSULIN LISPRO 100 [IU]/ML
0-15 INJECTION, SOLUTION INTRAVENOUS; SUBCUTANEOUS
Status: DISCONTINUED | OUTPATIENT
Start: 2025-05-30 | End: 2025-06-05 | Stop reason: HOSPADM

## 2025-05-29 RX ORDER — HEPARIN SODIUM 5000 [USP'U]/ML
5000 INJECTION, SOLUTION INTRAVENOUS; SUBCUTANEOUS EVERY 8 HOURS
Status: DISCONTINUED | OUTPATIENT
Start: 2025-05-29 | End: 2025-05-29

## 2025-05-29 RX ORDER — TAMSULOSIN HYDROCHLORIDE 0.4 MG/1
0.4 CAPSULE ORAL DAILY
Status: DISCONTINUED | OUTPATIENT
Start: 2025-05-29 | End: 2025-05-31

## 2025-05-29 RX ORDER — ACETAMINOPHEN 325 MG/1
650 TABLET ORAL EVERY 6 HOURS PRN
Status: DISCONTINUED | OUTPATIENT
Start: 2025-05-29 | End: 2025-06-05 | Stop reason: HOSPADM

## 2025-05-29 RX ADMIN — GABAPENTIN 300 MG: 300 CAPSULE ORAL at 08:44

## 2025-05-29 RX ADMIN — HEPARIN SODIUM 7500 UNITS: 5000 INJECTION INTRAVENOUS; SUBCUTANEOUS at 11:39

## 2025-05-29 RX ADMIN — ATORVASTATIN CALCIUM 40 MG: 40 TABLET, FILM COATED ORAL at 20:48

## 2025-05-29 RX ADMIN — ASCORBIC ACID, THIAMINE MONONITRATE,RIBOFLAVIN, NIACINAMIDE, PYRIDOXINE HYDROCHLORIDE, FOLIC ACID, CYANOCOBALAMIN, BIOTIN, CALCIUM PANTOTHENATE, 1 CAPSULE: 100; 1.5; 1.7; 20; 10; 1; 6000; 150000; 5 CAPSULE, LIQUID FILLED ORAL at 08:44

## 2025-05-29 RX ADMIN — CARVEDILOL 12.5 MG: 12.5 TABLET, FILM COATED ORAL at 20:47

## 2025-05-29 RX ADMIN — INSULIN LISPRO 4 UNITS: 100 INJECTION, SOLUTION INTRAVENOUS; SUBCUTANEOUS at 13:28

## 2025-05-29 RX ADMIN — PIPERACILLIN SODIUM AND TAZOBACTAM SODIUM 2.25 G: 2; .25 INJECTION, SOLUTION INTRAVENOUS at 05:01

## 2025-05-29 RX ADMIN — INSULIN LISPRO 2 UNITS: 100 INJECTION, SOLUTION INTRAVENOUS; SUBCUTANEOUS at 08:46

## 2025-05-29 RX ADMIN — PIPERACILLIN SODIUM AND TAZOBACTAM SODIUM 2.25 G: 2; .25 INJECTION, SOLUTION INTRAVENOUS at 18:10

## 2025-05-29 RX ADMIN — CARVEDILOL 12.5 MG: 12.5 TABLET, FILM COATED ORAL at 08:44

## 2025-05-29 RX ADMIN — HYDRALAZINE HYDROCHLORIDE 50 MG: 50 TABLET ORAL at 08:44

## 2025-05-29 RX ADMIN — INSULIN LISPRO 4 UNITS: 100 INJECTION, SOLUTION INTRAVENOUS; SUBCUTANEOUS at 16:40

## 2025-05-29 RX ADMIN — ISOSORBIDE MONONITRATE 30 MG: 30 TABLET, EXTENDED RELEASE ORAL at 08:44

## 2025-05-29 RX ADMIN — HYDRALAZINE HYDROCHLORIDE 50 MG: 50 TABLET ORAL at 16:41

## 2025-05-29 RX ADMIN — TAMSULOSIN HYDROCHLORIDE 0.4 MG: 0.4 CAPSULE ORAL at 18:08

## 2025-05-29 RX ADMIN — PIPERACILLIN SODIUM AND TAZOBACTAM SODIUM 2.25 G: 2; .25 INJECTION, SOLUTION INTRAVENOUS at 11:47

## 2025-05-29 RX ADMIN — HEPARIN SODIUM 7500 UNITS: 5000 INJECTION INTRAVENOUS; SUBCUTANEOUS at 20:47

## 2025-05-29 RX ADMIN — NYSTATIN 1 APPLICATION: 100000 POWDER TOPICAL at 20:47

## 2025-05-29 RX ADMIN — TORSEMIDE 40 MG: 20 TABLET ORAL at 08:45

## 2025-05-29 RX ADMIN — HYDRALAZINE HYDROCHLORIDE 50 MG: 50 TABLET ORAL at 20:47

## 2025-05-29 ASSESSMENT — COGNITIVE AND FUNCTIONAL STATUS - GENERAL
TOILETING: A LOT
DRESSING REGULAR UPPER BODY CLOTHING: A LOT
HELP NEEDED FOR BATHING: A LOT
DAILY ACTIVITIY SCORE: 16
STANDING UP FROM CHAIR USING ARMS: A LOT
CLIMB 3 TO 5 STEPS WITH RAILING: A LOT
WALKING IN HOSPITAL ROOM: A LOT
MOVING FROM LYING ON BACK TO SITTING ON SIDE OF FLAT BED WITH BEDRAILS: A LITTLE
DRESSING REGULAR LOWER BODY CLOTHING: A LOT
MOBILITY SCORE: 15
TURNING FROM BACK TO SIDE WHILE IN FLAT BAD: A LITTLE
MOVING TO AND FROM BED TO CHAIR: A LITTLE

## 2025-05-29 ASSESSMENT — PAIN - FUNCTIONAL ASSESSMENT
PAIN_FUNCTIONAL_ASSESSMENT: 0-10

## 2025-05-29 ASSESSMENT — PAIN SCALES - GENERAL
PAINLEVEL_OUTOF10: 0 - NO PAIN

## 2025-05-29 NOTE — CARE PLAN
Problem: Pain - Adult  Goal: Verbalizes/displays adequate comfort level or baseline comfort level  Outcome: Progressing     Problem: Safety - Adult  Goal: Free from fall injury  Outcome: Progressing     Problem: Discharge Planning  Goal: Discharge to home or other facility with appropriate resources  Outcome: Progressing     Problem: Chronic Conditions and Co-morbidities  Goal: Patient's chronic conditions and co-morbidity symptoms are monitored and maintained or improved  Outcome: Progressing     Problem: Nutrition  Goal: Nutrient intake appropriate for maintaining nutritional needs  Outcome: Progressing     Problem: Diabetes  Goal: Achieve decreasing blood glucose levels by end of shift  Outcome: Progressing  Goal: Increase stability of blood glucose readings by end of shift  Outcome: Progressing  Goal: Decrease in ketones present in urine by end of shift  Outcome: Progressing  Goal: Maintain electrolyte levels within acceptable range throughout shift  Outcome: Progressing  Goal: Maintain glucose levels >70mg/dl to <250mg/dl throughout shift  Outcome: Progressing  Goal: No changes in neurological exam by end of shift  Outcome: Progressing  Goal: Learn about and adhere to nutrition recommendations by end of shift  Outcome: Progressing  Goal: Vital signs within normal range for age by end of shift  Outcome: Progressing  Goal: Increase self care and/or family involovement by end of shift  Outcome: Progressing  Goal: Receive DSME education by end of shift  Outcome: Progressing     Problem: Skin  Goal: Decreased wound size/increased tissue granulation at next dressing change  Outcome: Progressing  Goal: Participates in plan/prevention/treatment measures  Outcome: Progressing  Goal: Prevent/manage excess moisture  Outcome: Progressing  Goal: Prevent/minimize sheer/friction injuries  Outcome: Progressing  Goal: Promote/optimize nutrition  Outcome: Progressing  Goal: Promote skin healing  Outcome: Progressing      Problem: Fall/Injury  Goal: Not fall by end of shift  Outcome: Progressing  Goal: Be free from injury by end of the shift  Outcome: Progressing  Goal: Verbalize understanding of personal risk factors for fall in the hospital  Outcome: Progressing  Goal: Verbalize understanding of risk factor reduction measures to prevent injury from fall in the home  Outcome: Progressing  Goal: Use assistive devices by end of the shift  Outcome: Progressing  Goal: Pace activities to prevent fatigue by end of the shift  Outcome: Progressing   The patient's goals for the shift include Patient would like to work with Pt/Ot    The clinical goals for the shift include patient will remain safe throughout shift

## 2025-05-29 NOTE — CARE PLAN
The patient's goals for the shift include Patient will remain free from pain during shift    The clinical goals for the shift include Patient will remain HDS throughout shift        Problem: Pain - Adult  Goal: Verbalizes/displays adequate comfort level or baseline comfort level  Outcome: Progressing     Problem: Safety - Adult  Goal: Free from fall injury  Outcome: Progressing     Problem: Discharge Planning  Goal: Discharge to home or other facility with appropriate resources  Outcome: Progressing     Problem: Chronic Conditions and Co-morbidities  Goal: Patient's chronic conditions and co-morbidity symptoms are monitored and maintained or improved  Outcome: Progressing     Problem: Nutrition  Goal: Nutrient intake appropriate for maintaining nutritional needs  Outcome: Progressing     Problem: Diabetes  Goal: Achieve decreasing blood glucose levels by end of shift  Outcome: Progressing  Goal: Increase stability of blood glucose readings by end of shift  Outcome: Progressing  Goal: Decrease in ketones present in urine by end of shift  Outcome: Progressing  Goal: Maintain electrolyte levels within acceptable range throughout shift  Outcome: Progressing  Goal: Maintain glucose levels >70mg/dl to <250mg/dl throughout shift  Outcome: Progressing  Goal: No changes in neurological exam by end of shift  Outcome: Progressing  Goal: Learn about and adhere to nutrition recommendations by end of shift  Outcome: Progressing  Goal: Vital signs within normal range for age by end of shift  Outcome: Progressing  Goal: Increase self care and/or family involovement by end of shift  Outcome: Progressing  Goal: Receive DSME education by end of shift  Outcome: Progressing     Problem: Skin  Goal: Decreased wound size/increased tissue granulation at next dressing change  Outcome: Progressing  Goal: Participates in plan/prevention/treatment measures  Outcome: Progressing  Goal: Prevent/manage excess moisture  Outcome: Progressing  Goal:  Prevent/minimize sheer/friction injuries  Outcome: Progressing  Goal: Promote/optimize nutrition  Outcome: Progressing  Goal: Promote skin healing  Outcome: Progressing     Problem: Fall/Injury  Goal: Not fall by end of shift  Outcome: Progressing  Goal: Be free from injury by end of the shift  Outcome: Progressing  Goal: Verbalize understanding of personal risk factors for fall in the hospital  Outcome: Progressing  Goal: Verbalize understanding of risk factor reduction measures to prevent injury from fall in the home  Outcome: Progressing  Goal: Use assistive devices by end of the shift  Outcome: Progressing  Goal: Pace activities to prevent fatigue by end of the shift  Outcome: Progressing

## 2025-05-29 NOTE — CARE PLAN
The patient's goals for the shift include Patient would like to work with Pt/Ot    The clinical goals for the shift include patient will remain safe throughout shift    Problem: Pain - Adult  Goal: Verbalizes/displays adequate comfort level or baseline comfort level  Outcome: Progressing     Problem: Safety - Adult  Goal: Free from fall injury  Outcome: Progressing     Problem: Discharge Planning  Goal: Discharge to home or other facility with appropriate resources  Outcome: Progressing     Problem: Chronic Conditions and Co-morbidities  Goal: Patient's chronic conditions and co-morbidity symptoms are monitored and maintained or improved  Outcome: Progressing     Problem: Nutrition  Goal: Nutrient intake appropriate for maintaining nutritional needs  Outcome: Progressing     Problem: Diabetes  Goal: Achieve decreasing blood glucose levels by end of shift  Outcome: Progressing  Goal: Increase stability of blood glucose readings by end of shift  Outcome: Progressing  Goal: Decrease in ketones present in urine by end of shift  Outcome: Progressing  Goal: Maintain electrolyte levels within acceptable range throughout shift  Outcome: Progressing  Goal: Maintain glucose levels >70mg/dl to <250mg/dl throughout shift  Outcome: Progressing  Goal: No changes in neurological exam by end of shift  Outcome: Progressing  Goal: Learn about and adhere to nutrition recommendations by end of shift  Outcome: Progressing  Goal: Vital signs within normal range for age by end of shift  Outcome: Progressing  Goal: Increase self care and/or family involovement by end of shift  Outcome: Progressing  Goal: Receive DSME education by end of shift  Outcome: Progressing     Problem: Skin  Goal: Decreased wound size/increased tissue granulation at next dressing change  Outcome: Progressing  Goal: Participates in plan/prevention/treatment measures  Outcome: Progressing  Goal: Prevent/manage excess moisture  Outcome: Progressing  Goal:  Prevent/minimize sheer/friction injuries  Outcome: Progressing  Goal: Promote/optimize nutrition  Outcome: Progressing  Goal: Promote skin healing  Outcome: Progressing     Problem: Fall/Injury  Goal: Not fall by end of shift  Outcome: Progressing  Goal: Be free from injury by end of the shift  Outcome: Progressing  Goal: Verbalize understanding of personal risk factors for fall in the hospital  Outcome: Progressing  Goal: Verbalize understanding of risk factor reduction measures to prevent injury from fall in the home  Outcome: Progressing  Goal: Use assistive devices by end of the shift  Outcome: Progressing  Goal: Pace activities to prevent fatigue by end of the shift  Outcome: Progressing

## 2025-05-29 NOTE — PROGRESS NOTES
Nuris Squires is a 59 y.o. female on day 5 of admission presenting with Left foot infection.        Objective     Last Recorded Vitals  /88   Pulse 74   Temp 36.3 °C (97.3 °F)   Resp 16   Wt 102 kg (225 lb)   SpO2 94%   Intake/Output last 3 Shifts:    Intake/Output Summary (Last 24 hours) at 5/29/2025 1938  Last data filed at 5/29/2025 1821  Gross per 24 hour   Intake --   Output 650 ml   Net -650 ml       Admission Weight  Weight: 102 kg (225 lb) (05/24/25 1307)    Daily Weight  05/24/25 : 102 kg (225 lb)    Image Results  Bedside Midline Imaging  These images are not reportable by radiology and will not be interpreted   by  Radiologists.  MR foot left w and wo IV contrast  Narrative: Interpreted By:  Randal Garcia  and Tre Bergeron   STUDY:  MRI of the left forefoot without and with IV contrast;      INDICATION:  Signs/Symptoms:Osteo L foot          COMPARISON:  Same day foot CT      ACCESSION NUMBER(S):  [id number]      ORDERING CLINICIAN:  NGA CASTILLO      TECHNIQUE:  Multiplanar multisequence MRI of the  left forefoot was performed  without and with IV contrast      FINDINGS:  Postsurgical changes from prior 1st digit interphalangeal and 2nd  digit MTP joint amputations are seen.      Soft tissues: There is a plantar soft tissue ulcer at the level of  the  4th metatarsophalangeal joint with associated subjacent soft  tissue edema and enhancement, suggestive of cellulitis. Questionable  fluid collection versus confluent edema is seen in the plantar soft  tissues at the level of the 3rd mid metatarsal measuring 1.0 x 0.4 x  1.0 cm.      Bones:  There is STIR hyperintensity and loss of normal T1 marrow  signal involving approximate of the distal half of the 4th metatarsal  and the majority of the 4th proximal phalanx with associated osseous  irregularity at the joint. There is abnormal marrow signal the 4th  middle phalanx on STIR images. Additionally there is STIR  hyperintensity of the 5th  proximal and middle phalanges with subtle  loss of normal T1 marrow signal (series 8, image 21). There is marrow  edema of the 5th metatarsal head. Mild patchy marrow edema involving  the base of the 3rd proximal phalanx. Likely pathologic fracture of  the 4th metatarsal neck.      Muscles:  Edema and atrophy of the plantar foot musculature,  compatible with diabetic myopathy and/or myositis.      Miscellaneous:  Visualized tendons and Lisfranc ligament are grossly  intact.      Impression: 1. Osteomyelitis of the 4th metatarsal, 4th proximal phalanx, and  high likelihood of osteomyelitis of the 4th middle phalanx, as above.  Pathologic fracture of 4th metatarsal neck.  2. Osteomyelitis of the 5th proximal and middle phalanges, as above.  High likelihood of osteolysis of the 5th metatarsal head.  3. High likelihood of osteomyelitis 3rd proximal phalangeal base.  4. Multifocal soft tissue ulceration, cellulitis, and likely small  subcutaneous abscess, as above.  5. Postsurgical changes from prior 1st digit interphalangeal and 2nd  digit MTP joint amputations          I personally reviewed the images/study and I agree with the resident  Rubio Toledo's findings as stated. This study was interpreted  at [place], [place].      MACRO:  None      Signed by: Randal Garcia 5/25/2025 7:44 AM  Dictation workstation:   [id number]      Physical Exam  Constitutional: Pleasant elderly female, alert active, cooperative not in acute distress  Eyes: PERRLA, clear sclera  ENMT: Moist mucosal membranes, no exudate  Head / Neck: Atraumatic, normocephalic, supple neck, JVP not visualized  Lungs: Patent airways, CTABL  Heart: RRR, S1S2, no murmurs appreciated, palpable pulses in all extremities  GI: Soft, NT, ND, bowel sounds present in all quadrants  MSK: Moves all extremities freely, no restriction  of ROM, no joint edema  Extremities: Left foot covered with dressing dry and intact,   Midline insertion in the right arm, no peripheral edema  : No Louis catheter inserted  Breast: Deferred  Neurological: AAO x 3 to person, place and date, facial muscles symmetrical, sensation intact, strength 4/4, no acute focal neurological deficits appreciated  Psychological: Appropriate mood and behavior    Relevant Results                Scheduled medications  Scheduled Medications[1]  Continuous medications  Continuous Medications[2]  PRN medications  PRN Medications[3]    Assessment & Plan  Left foot infection    Chronic ulcer of left foot with fat layer exposed (Multi)    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  Osteomyelitis of the left foot, status post left 4th and 5th digit metatarsal amputation and washout, podiatry following.              Subjective      5/29  -Pt was seen  and examined this am, she is resting well in her bed, sleepy. C/o  that she hasn't urinated on her own since surgery. Advised that we are trying TOV with straight cathing for one more day before placing Louis.   -BP is better controlled          AP      #VENKAT on CKD, stage 5, worsening.  -She was CKD 3B vs IV Jan 2025. BS Cr 1.68 --> 5 now   -Requested UA, urine c/s, urine lytes, C3, C4   -Renal to see tomorrow.  -I&O.  -Avoid Renal toxic med.  -Switch the enoxaparin to heparin 7500 subcutaneous q 8 hours    #Urinary retention S/p surgery vs Med induced (Oxy).   -TOV with bladder scan and straight cath, stopped Oxy.   -Avoid constipation. Renal US requested to r/o obstruction and assess hydro.  -Added tamsulosin 0.4 mg daily        #Osteomyelitis of L foot  #Diabetic gas  forming infection of left foot   #Status post podiatry intervention with  L 4th and 5th metatarsal amputation and washout  on 5/25  -Wound cultures sent    -Clindamycin discontinued postoperatively  -Zosyn 2.25 g IV piggyback every 6 hours, vancomycin 1000 mg IV piggyback daily  -Podiatry consulted: s/p left foot 4th and 5th digit amputation, scheduled for Left foot 5/27:  Delayed Primary Closure, Bone cortex Excision, Reverse Sural Flap 5/27/25.   - ID consulted for further information on culutres  - pt/ot, mobilize pt as tolerated, NWB to LLE.    -Post op shoe to the left foot requested   -ID consulted to determine abx duration.  -She would like to know her options for nursing home instead of going home. PTOT requested.   -Reports  pain is controlled.  -Discontinue clindamycin, Discussed with ID team.   -Pending final recommendations from ID team.  -Wound care instruction requested: betadine soaked Adaptic, 4 x 4's ABD pad, kerlix, ace wrap. [place] [facility] [place] is already scheduled    -Continue vancomycin and Zosyn       #Acute on chronic anemia. Stable         #HFpEF/HTN  - compensated     #T2DM  #Hyperglycemic, escalated ISS#3.   -SSI while inpatient, carb controlled diet   -hypoglycemia protocol                Diet  - Diabetic Renal   - Dispo SNF eventually  TCC was updated      ADOD > 2 days     ADOD in 2 days      Mikala Escoto MD           [1] atorvastatin, 40 mg, oral, Nightly  carvedilol, 12.5 mg, oral, BID  [Held by provider] gabapentin, 300 mg, oral, BID  heparin (porcine), 7,500 Units, subcutaneous, q8h  hydrALAZINE, 50 mg, oral, TID  [START ON 5/30/2025] insulin lispro, 0-15 Units, subcutaneous, TID AC  isosorbide mononitrate ER, 30 mg, oral, Daily  lidocaine, 5 mL, infiltration, Once  nystatin, 1 Application, Topical, BID  piperacillin-tazobactam, 2.25 g, intravenous, q6h  polyethylene glycol, 17 g, oral, Daily  tamsulosin, 0.4 mg, oral, Daily  torsemide, 40 mg,  oral, Daily  vitamin B complex-vitamin C-folic acid, 1 capsule, oral, Daily     [2]    [3] PRN medications: dextrose, dextrose, glucagon, glucagon, oxyCODONE, vancomycin

## 2025-05-29 NOTE — PROGRESS NOTES
Vancomycin Dosing by Pharmacy- FOLLOW UP    Nuris Squires is a 59 y.o. year old female who Pharmacy has been consulted for vancomycin dosing for osteomyelitis/septic arthritis. Based on the patient's indication and renal status this patient is being dosed based on a goal trough/random level of 15-20.     Renal function is currently declining. Patient with CKD, b/l scr ~3.6-4.1, patient admitted with a scr of 2.46, up to 6.35 today. Patient still hasn't cleared vanc dose given over the weekend due to worsening renal function.     Current vancomycin dose 1000mg given     Most recent random level: 20.9 mcg/mL    Visit Vitals  /71   Pulse 65   Temp 36.2 °C (97.2 °F) (Temporal)   Resp 16        Lab Results   Component Value Date    CREATININE 6.35 (H) 2025    CREATININE 5.38 (H) 2025    CREATININE 4.62 (H) 2025    CREATININE 3.60 (H) 2025    CREATININE 2.44 (H) 2025        Patient weight is as follows:   Vitals:    25 1307   Weight: 102 kg (225 lb)       Cultures:  No results found for the encounter in last 14 days.       I/O last 3 completed shifts:  In: - (0 mL/kg)   Out: 714 (7 mL/kg) [Urine:714 (0.2 mL/kg/hr)]  Weight: 102.1 kg   I/O during current shift:  No intake/output data recorded.    Temp (24hrs), Av.4 °C (97.5 °F), Min:36.2 °C (97.2 °F), Max:36.6 °C (97.9 °F)      Assessment/Plan    While only slightly above goal, will not give a dose again today due to impaired clearance of vanc due to patient's VENKAT on CKD, concern that if dose given, will become supra therapeutic and stay elevated for several days. Consider a small (500mg) dose Friday depending on scr and level.     The next level will be obtained on  at 0500. May be obtained sooner if clinically indicated.   Will continue to monitor renal function daily while on vancomycin and order serum creatinine at least every 48 hours if not already ordered.  Follow for continued vancomycin needs, clinical  response, and signs/symptoms of toxicity.       Odette Squires, PharmD   [facility] [place] [place] [facility] Pharmacist

## 2025-05-29 NOTE — PROGRESS NOTES
PODIATRY PROGRESS NOTE     SERVICE DATE: 5/28/2025       SERVICE TIME:  1:50 PM         Consults   PRIMARY CARE PHYSICIAN: Mone Aquino MD     Subjective  S/P Left Open TMA POD#4 (DOS: 5/25/25) and Left Delayed primary closure with lower extremity debridement POD#2 (5/27/25) with Dr. Ramos   Pt seen at bedside.  No overnight events.  Pt admits to minimal pain to operative extremity.  Has been elevating foot on pillow.  Pt denies any constitutional symptoms.   No other pedal complaints at this time.     HPI: Ms. Squires is a 59 y.o. female  who presents for Left foot infection, is on day 0 of admission. 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 Garcia DPM for osteomyelitis of L foot. Patient followed up with her podiatrist in 5/23 and had xrays concerning for osteomyelitis and possible gas. Patient endorses 5/10 sharp shooting pain to her left lower extremity. Patient did not know she had a wound on the bottom of her foot until seeing her podiatrist and thus does not know how much drainage she has had. Patient denies being on PO abx. Discussed with patient emergent need for a Left TMA. Patient agreeable. Discussed postoperative course with the patient.  Patient denies any constitutional symptoms. No other pedal complaints.      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 01/06/2025    Personal history of other diseases of the circulatory system       History of hypertension    Personal history of other endocrine, nutritional and metabolic disease       History of hyperlipidemia    Personal history of other endocrine, nutritional and metabolic disease       History of diabetes mellitus     [Surgical History]    [Surgical History]  Past  Surgical History        Procedure Laterality Date    CARDIAC CATHETERIZATION N/A 1/8/2025     Procedure: Right Heart Cath;  Surgeon: Migel Stanton MD;  Location: [place] [address] [facility]iac [facility];  Service: Cardiovascular;  Laterality: N/A;    CARDIAC CATHETERIZATION N/A 1/17/2025     Procedure: Right Heart Cath;  Surgeon: Ana Lea MD;  Location: [place] [address] Cardiac [facility];  Service: Cardiovascular;  Laterality: N/A;    CT ANGIO NECK   1/25/2023     CT NECK ANGIO W AND WO IV CONTRAST 1/25/2023 [facility]    CT HEAD ANGIO W AND WO IV CONTRAST   1/25/2023     CT HEAD ANGIO W AND WO IV CONTRAST 1/25/2023 DOCTOR OFFICE [facility]GAC[facility]    OTHER SURGICAL HISTORY   10/21/2020     Toe amputation     [Family History]    [Family History]         Problem Relation Name Age of Onset    Alzheimer's disease Mother        Diabetes Mother        Lung cancer Mother        Diabetes Father        Lung cancer Father        Pancreatic cancer Father        Diabetes Sister        Lung cancer Sister         [Social History]     [Social History]        Tobacco Use    Smoking status: Never       Passive exposure: Never    Smokeless tobacco: Never   Vaping Use    Vaping status: Never Used   Substance Use Topics    Alcohol use: Yes    Drug use: Never     [Prescriptions Prior to Admission]             [Prescriptions Prior to Admission]  (Not in a hospital admission)  [Allergies]     [Allergies]  No Known Allergies     Medications:  [Scheduled Medications]    [Scheduled Medications]  atorvastatin, 40 mg, oral, Nightly  carvedilol, 12.5 mg, oral, BID  clindamycin, 600 mg, intravenous, q8h  enoxaparin, 30 mg, subcutaneous, q24h  gabapentin, 300 mg, oral, BID  [Held by provider] hydrALAZINE, 50 mg, oral, TID  [Held by provider] isosorbide mononitrate ER, 30 mg, oral, Daily  polyethylene glycol, 17 g, oral, Daily  [Held by provider] torsemide, 40 mg, oral, Daily  vancomycin, 1,000 mg, intravenous, q24h  [START ON 5/25/2025] vitamin B  complex-vitamin C-folic acid, 1 capsule, oral, Daily  [Continuous Medications]    [Continuous Medications]     [PRN Medications]    [PRN Medications]  PRN medications: dextrose, dextrose, glucagon, glucagon, oxyCODONE, vancomycin         Allergies as of 05/24/2025    (No Known Allergies)               Objective  PHYSICAL EXAM:      Vitals:     05/24/25 1830   BP: (!) 188/90   Pulse: 80   Resp: 17   Temp:     SpO2:        Body mass index is 41.15 kg/m².     Patient is AOx3 and in no acute distress. Patient is alert and cooperative. Sitting comfortably in bed with dressing clean, dry and intact.      Vascular: Palpable DP/PT pulses B/L. Moderate pitting edema noted B/L. Hair growth absent B/L. CFT<5 to B/L hallux. Temperature is warm to cool from tibial tuberosity to distal digits B/L. No lymphatic streaking noted B/L.     Musculoskeletal: Gross active and passive ROM intact to age and activity level. Moves all extremities spontaneously. No pain to palpation at feet B/L.      Neurological: Intact light touch sensation B/L. Pain stimuli diminished B/L. Denies any numbness, burning or tingling.     Dermatologic: 1-5 R are within normal limits for thickness and length B/L. Skin appears diffusely xerotic B/L.    Wound: Left TMA Site   Incision site is well coapted with retention sutures and surgical staples.   Mild serosanguinous drainage with fibrotic slough.   Mild selene-wound maceration.   Mild selene-wound erythema.   No evidence of ascending cellulitis or lymphangitis.  No  palpable fluctuance. No malodor. No increased warmth.   No probe to bone or deep structures within the wound base.   No undermining. Skin edges irregular but intact.     LABS:         Lab Results   Component Value Date     HGBA1C 7.3 (A) 05/20/2025            Lab Results   Component Value Date     CRP 2.21 (H) 05/24/2025            Lab Results   Component Value Date     SEDRATE 36 (H) 02/05/2025              Results from last 7 days   Lab Units  05/24/25  1327   WBC AUTO x10*3/uL 6.0   RBC AUTO x10*6/uL 2.80*   HEMOGLOBIN g/dL 8.1*   HEMATOCRIT % 25.6*           Results from last 7 days   Lab Units 05/24/25  1327   GLUCOSE mg/dL 265*   SODIUM mmol/L 136   POTASSIUM mmol/L 4.4   CHLORIDE mmol/L 104   CO2 mmol/L 23   BUN mg/dL 29*   CREATININE mg/dL 2.46*   CALCIUM mg/dL 8.5*   BILIRUBIN TOTAL mg/dL 0.4   ALT U/L 9   AST U/L 13         Cultures  No results found for the last 90 days.        IMAGING REVIEW:  XR foot left 3+ views 05/23/2025     Narrative  Interpreted By:  Raymundo Early,  STUDY:  XR FOOT LEFT 3+ VIEWS     INDICATION:  Signs/Symptoms:foot infection.     COMPARISON:  October 9, 2020     ACCESSION NUMBER(S):  [id number]     ORDERING CLINICIAN:  ANURAG BRUNNER     FINDINGS:  Postsurgical changes of 1st and 2nd digit amputation.     New bony erosive changes at the 4th proximal phalanx and metatarsal  head highly concerning for osteomyelitis.     Marked soft tissue swelling with possible soft tissue gas which could  be gas producing soft tissue infection.     Impression  New bony erosive changes at the 4th proximal phalanx and metatarsal  head highly concerning for osteomyelitis.     Marked soft tissue swelling with possible soft tissue gas which could  be gas producing soft tissue infection.     MACRO:  Critical Finding:  See findings. Notification was initiated on  5/24/2025 at 8:25 am by  Raymundo Early.  (**-OCF-**)     Signed by: Raymundo Early 5/24/2025 8:25 AM  Dictation workstation:   [id number]        Vascular   No results found for this or any previous visit from the past 365 days.              Assessment/Plan  ASSESSMENT & PLAN:  Shaw is a 59 y.o. female  who presents for Left foot infection, is on day 0 of admission.     #Osteomyelitis of L foot  #Diabetic foot infection, recurrent  #T2DM   S/P Left Open TMA POD#4 (DOS: 5/25/25) and Left Delayed primary closure with lower extremity debridement POD#2 (5/27/25) with Dr. Ramos   - Patient was  seen and evaluated; all findings were discussed and all questions were answered to patient's satisfaction.  - Charts, labs, vitals and imaging all reviewed.   - Imaging:   Xray left foot:   New bony erosive changes at the 4th proximal phalanx and metatarsal  head highly concerning for osteomyelitis.  Marked soft tissue swelling with possible soft tissue gas which could  be gas producing soft tissue infection.  MRI Left Foot:   1. Osteomyelitis of the 4th metatarsal, 4th proximal phalanx, and  high likelihood of osteomyelitis of the 4th middle phalanx, as above.  Pathologic fracture of 4th metatarsal neck.  2. Osteomyelitis of the 5th proximal and middle phalanges, as above.  High likelihood of osteolysis of the 5th metatarsal head.  3. High likelihood of osteomyelitis 3rd proximal phalangeal base.  4. Multifocal soft tissue ulceration, cellulitis, and likely small  subcutaneous abscess, as above.  5. Postsurgical changes from prior 1st digit interphalangeal and 2nd  digit MTP joint amputations  Plan:  - Antibiotics: Per Primary team/ID  - Patient surgical incision is progressing well.   - Dressings:  betadine soaked  4x4's Kerlix, ACE wrap  - Nursing staff is able to change/reinforce dressing if & as necessary until next dressing change. Thank you.  - Patient is optimized for discharge from podiatry's standpoint.  - Podiatry will continue to follow while in house.  - Follow-up with Dr. Andria Uribe at [address] [place] [address] next Monday 6/2/25. DVT ppx: Per Primary team  Weightbearing: NWB LLE WBAT RLE  Discharge: Pt to follow up 1 week after discharge with Dr. Ramos     Patient evaluated/discussed with attending Dr. Richard SOW     Case to be discussed with attending, A&P above reflects a tentative plan. Please await for the final signature from the attending physician on service.     Barrington Llanos DPM PGY-3  Podiatric Medicine & Surgery  [id number]  Epic Haiku chat preferred      I saw and evaluated the patient. I  personally obtained the key and critical portions of the history and physical exam or was physically present for key and critical portions performed by the resident/fellow. I reviewed the resident/fellow's documentation and discussed the patient with the resident/fellow. I agree with the resident/fellow's medical decision making as documented in the note.    I spent 30 minutes in the professional and overall care of this patient.    Socrates Ramos DPM

## 2025-05-29 NOTE — PROGRESS NOTES
Transitional Care Coordination Progress Note:  Patient discussed during interdisciplinary rounds.   Team members present: MD, TCC  Plan per Medical/Surgical team: Left foot infection  Payor: [employer]  Discharge disposition: [facility]  Potential Barriers: medical  ADOD: 2-3 days  Per MD, nephrology consulted due to elevated creatinine. [facility] able to accept and patient in agreement. Final ID recommendations remain pending. Patient will need precert closer to time of dc. Will continue to follow for discharge planning needs.     Melissa LUXN, RN  Transitional Care Coordinator (TCC)  [phone]

## 2025-05-30 ENCOUNTER — APPOINTMENT (OUTPATIENT)
Dept: WOUND CARE | Facility: CLINIC | Age: 60
End: 2025-05-30
Payer: COMMERCIAL

## 2025-05-30 ENCOUNTER — APPOINTMENT (OUTPATIENT)
Dept: RADIOLOGY | Facility: HOSPITAL | Age: 60
End: 2025-05-30
Payer: COMMERCIAL

## 2025-05-30 LAB
ALBUMIN SERPL BCP-MCNC: 2.6 G/DL (ref 3.4–5)
ANION GAP SERPL CALC-SCNC: 18 MMOL/L (ref 10–20)
BASOPHILS # BLD AUTO: 0.04 X10*3/UL (ref 0–0.1)
BASOPHILS NFR BLD AUTO: 0.4 %
BUN SERPL-MCNC: 55 MG/DL (ref 6–23)
CALCIUM SERPL-MCNC: 7.6 MG/DL (ref 8.6–10.6)
CHLORIDE SERPL-SCNC: 105 MMOL/L (ref 98–107)
CO2 SERPL-SCNC: 19 MMOL/L (ref 21–32)
CREAT SERPL-MCNC: 6.69 MG/DL (ref 0.5–1.05)
EGFRCR SERPLBLD CKD-EPI 2021: 7 ML/MIN/1.73M*2
EOSINOPHIL # BLD AUTO: 0.34 X10*3/UL (ref 0–0.7)
EOSINOPHIL NFR BLD AUTO: 3.6 %
ERYTHROCYTE [DISTWIDTH] IN BLOOD BY AUTOMATED COUNT: 15.2 % (ref 11.5–14.5)
FUNGUS SPEC CULT: NORMAL
FUNGUS SPEC CULT: NORMAL
FUNGUS SPEC FUNGUS STN: NORMAL
FUNGUS SPEC FUNGUS STN: NORMAL
GLUCOSE BLD MANUAL STRIP-MCNC: 140 MG/DL (ref 74–99)
GLUCOSE BLD MANUAL STRIP-MCNC: 158 MG/DL (ref 74–99)
GLUCOSE BLD MANUAL STRIP-MCNC: 167 MG/DL (ref 74–99)
GLUCOSE BLD MANUAL STRIP-MCNC: 219 MG/DL (ref 74–99)
GLUCOSE SERPL-MCNC: 199 MG/DL (ref 74–99)
HCT VFR BLD AUTO: 26.8 % (ref 36–46)
HGB BLD-MCNC: 7.9 G/DL (ref 12–16)
IMM GRANULOCYTES # BLD AUTO: 0.03 X10*3/UL (ref 0–0.7)
IMM GRANULOCYTES NFR BLD AUTO: 0.3 % (ref 0–0.9)
LYMPHOCYTES # BLD AUTO: 2.08 X10*3/UL (ref 1.2–4.8)
LYMPHOCYTES NFR BLD AUTO: 22.2 %
MCH RBC QN AUTO: 29 PG (ref 26–34)
MCHC RBC AUTO-ENTMCNC: 29.5 G/DL (ref 32–36)
MCV RBC AUTO: 99 FL (ref 80–100)
MONOCYTES # BLD AUTO: 0.64 X10*3/UL (ref 0.1–1)
MONOCYTES NFR BLD AUTO: 6.8 %
NEUTROPHILS # BLD AUTO: 6.24 X10*3/UL (ref 1.2–7.7)
NEUTROPHILS NFR BLD AUTO: 66.7 %
NRBC BLD-RTO: 0 /100 WBCS (ref 0–0)
PHOSPHATE SERPL-MCNC: 7 MG/DL (ref 2.5–4.9)
PLATELET # BLD AUTO: 293 X10*3/UL (ref 150–450)
POTASSIUM SERPL-SCNC: 4.7 MMOL/L (ref 3.5–5.3)
RBC # BLD AUTO: 2.72 X10*6/UL (ref 4–5.2)
SODIUM SERPL-SCNC: 137 MMOL/L (ref 136–145)
VANCOMYCIN SERPL-MCNC: 18.1 UG/ML (ref 5–20)
WBC # BLD AUTO: 9.4 X10*3/UL (ref 4.4–11.3)

## 2025-05-30 PROCEDURE — 80069 RENAL FUNCTION PANEL: CPT

## 2025-05-30 PROCEDURE — 2500000004 HC RX 250 GENERAL PHARMACY W/ HCPCS (ALT 636 FOR OP/ED): Mod: JZ

## 2025-05-30 PROCEDURE — 76770 US EXAM ABDO BACK WALL COMP: CPT

## 2025-05-30 PROCEDURE — 76770 US EXAM ABDO BACK WALL COMP: CPT | Performed by: RADIOLOGY

## 2025-05-30 PROCEDURE — 36415 COLL VENOUS BLD VENIPUNCTURE: CPT | Performed by: STUDENT IN AN ORGANIZED HEALTH CARE EDUCATION/TRAINING PROGRAM

## 2025-05-30 PROCEDURE — 85025 COMPLETE CBC W/AUTO DIFF WBC: CPT | Performed by: STUDENT IN AN ORGANIZED HEALTH CARE EDUCATION/TRAINING PROGRAM

## 2025-05-30 PROCEDURE — 2500000002 HC RX 250 W HCPCS SELF ADMINISTERED DRUGS (ALT 637 FOR MEDICARE OP, ALT 636 FOR OP/ED): Performed by: STUDENT IN AN ORGANIZED HEALTH CARE EDUCATION/TRAINING PROGRAM

## 2025-05-30 PROCEDURE — 99232 SBSQ HOSP IP/OBS MODERATE 35: CPT | Performed by: STUDENT IN AN ORGANIZED HEALTH CARE EDUCATION/TRAINING PROGRAM

## 2025-05-30 PROCEDURE — 2500000001 HC RX 250 WO HCPCS SELF ADMINISTERED DRUGS (ALT 637 FOR MEDICARE OP)

## 2025-05-30 PROCEDURE — 2500000001 HC RX 250 WO HCPCS SELF ADMINISTERED DRUGS (ALT 637 FOR MEDICARE OP): Performed by: STUDENT IN AN ORGANIZED HEALTH CARE EDUCATION/TRAINING PROGRAM

## 2025-05-30 PROCEDURE — 99222 1ST HOSP IP/OBS MODERATE 55: CPT | Performed by: INTERNAL MEDICINE

## 2025-05-30 PROCEDURE — 87077 CULTURE AEROBIC IDENTIFY: CPT | Performed by: STUDENT IN AN ORGANIZED HEALTH CARE EDUCATION/TRAINING PROGRAM

## 2025-05-30 PROCEDURE — 82947 ASSAY GLUCOSE BLOOD QUANT: CPT

## 2025-05-30 PROCEDURE — 1100000001 HC PRIVATE ROOM DAILY

## 2025-05-30 PROCEDURE — 2500000004 HC RX 250 GENERAL PHARMACY W/ HCPCS (ALT 636 FOR OP/ED): Performed by: STUDENT IN AN ORGANIZED HEALTH CARE EDUCATION/TRAINING PROGRAM

## 2025-05-30 PROCEDURE — 80202 ASSAY OF VANCOMYCIN: CPT

## 2025-05-30 RX ORDER — AMOXICILLIN AND CLAVULANATE POTASSIUM 500; 125 MG/1; MG/1
1 TABLET, FILM COATED ORAL EVERY 24 HOURS
Status: DISCONTINUED | OUTPATIENT
Start: 2025-05-30 | End: 2025-06-05 | Stop reason: HOSPADM

## 2025-05-30 RX ADMIN — AMOXICILLIN AND CLAVULANATE POTASSIUM 1 TABLET: 500; 125 TABLET, FILM COATED ORAL at 13:48

## 2025-05-30 RX ADMIN — ATORVASTATIN CALCIUM 40 MG: 40 TABLET, FILM COATED ORAL at 20:27

## 2025-05-30 RX ADMIN — HYDRALAZINE HYDROCHLORIDE 50 MG: 50 TABLET ORAL at 16:29

## 2025-05-30 RX ADMIN — INSULIN LISPRO 3 UNITS: 100 INJECTION, SOLUTION INTRAVENOUS; SUBCUTANEOUS at 12:11

## 2025-05-30 RX ADMIN — NYSTATIN 1 APPLICATION: 100000 POWDER TOPICAL at 08:35

## 2025-05-30 RX ADMIN — CARVEDILOL 12.5 MG: 12.5 TABLET, FILM COATED ORAL at 08:32

## 2025-05-30 RX ADMIN — HEPARIN SODIUM 7500 UNITS: 5000 INJECTION INTRAVENOUS; SUBCUTANEOUS at 05:47

## 2025-05-30 RX ADMIN — HYDRALAZINE HYDROCHLORIDE 50 MG: 50 TABLET ORAL at 20:28

## 2025-05-30 RX ADMIN — CARVEDILOL 12.5 MG: 12.5 TABLET, FILM COATED ORAL at 20:27

## 2025-05-30 RX ADMIN — TORSEMIDE 40 MG: 20 TABLET ORAL at 08:32

## 2025-05-30 RX ADMIN — INSULIN LISPRO 6 UNITS: 100 INJECTION, SOLUTION INTRAVENOUS; SUBCUTANEOUS at 08:31

## 2025-05-30 RX ADMIN — ISOSORBIDE MONONITRATE 30 MG: 30 TABLET, EXTENDED RELEASE ORAL at 08:32

## 2025-05-30 RX ADMIN — PIPERACILLIN SODIUM AND TAZOBACTAM SODIUM 2.25 G: 2; .25 INJECTION, SOLUTION INTRAVENOUS at 00:26

## 2025-05-30 RX ADMIN — PIPERACILLIN SODIUM AND TAZOBACTAM SODIUM 2.25 G: 2; .25 INJECTION, SOLUTION INTRAVENOUS at 05:48

## 2025-05-30 RX ADMIN — HEPARIN SODIUM 7500 UNITS: 5000 INJECTION INTRAVENOUS; SUBCUTANEOUS at 13:49

## 2025-05-30 RX ADMIN — TAMSULOSIN HYDROCHLORIDE 0.4 MG: 0.4 CAPSULE ORAL at 18:39

## 2025-05-30 RX ADMIN — HEPARIN SODIUM 7500 UNITS: 5000 INJECTION INTRAVENOUS; SUBCUTANEOUS at 20:28

## 2025-05-30 RX ADMIN — ASCORBIC ACID, THIAMINE MONONITRATE,RIBOFLAVIN, NIACINAMIDE, PYRIDOXINE HYDROCHLORIDE, FOLIC ACID, CYANOCOBALAMIN, BIOTIN, CALCIUM PANTOTHENATE, 1 CAPSULE: 100; 1.5; 1.7; 20; 10; 1; 6000; 150000; 5 CAPSULE, LIQUID FILLED ORAL at 08:32

## 2025-05-30 ASSESSMENT — COGNITIVE AND FUNCTIONAL STATUS - GENERAL
DRESSING REGULAR LOWER BODY CLOTHING: A LITTLE
DAILY ACTIVITIY SCORE: 21
WALKING IN HOSPITAL ROOM: A LITTLE
HELP NEEDED FOR BATHING: A LITTLE
TOILETING: A LITTLE
TOILETING: A LITTLE
HELP NEEDED FOR BATHING: A LITTLE
MOBILITY SCORE: 22
CLIMB 3 TO 5 STEPS WITH RAILING: A LITTLE
WALKING IN HOSPITAL ROOM: A LITTLE
DRESSING REGULAR LOWER BODY CLOTHING: A LITTLE
CLIMB 3 TO 5 STEPS WITH RAILING: A LITTLE

## 2025-05-30 ASSESSMENT — PAIN SCALES - GENERAL
PAINLEVEL_OUTOF10: 0 - NO PAIN
PAINLEVEL_OUTOF10: 0 - NO PAIN

## 2025-05-30 ASSESSMENT — PAIN - FUNCTIONAL ASSESSMENT: PAIN_FUNCTIONAL_ASSESSMENT: 0-10

## 2025-05-30 NOTE — CARE PLAN
The patient's goals for the shift include Patient would like to work with Pt/Ot    The clinical goals for the shift include Patient will remain safe and free of injury throughout shift.    Over the shift, the patient did make progress toward the following goals.      Problem: Safety - Adult  Goal: Free from fall injury  Outcome: Progressing     Problem: Fall/Injury  Goal: Not fall by end of shift  Outcome: Progressing     Problem: Fall/Injury  Goal: Be free from injury by end of the shift  Outcome: Progressing

## 2025-05-30 NOTE — CONSULTS
"NEPHROLOGY NEW CONSULT NOTE   Nuris Squires   59 y.o.    @WT@  MRN/Room: [id number]/324/324-A    Reason for consult: SIMON on CKD    HPI:  Nuris Squires is a 59 y.o. 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 Garcia DPM for osteomyelitis of L foot status post left 4th and 5th digit metatarsal amputation and washout. Nephrology is consulted for SIMON on CKD 4.       As per chart review. She was admitted in Jan 2025 with HTN emergency and ADHF, she had an Simon that admission but did not require a RRT. 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 PVR however was 0cc. Patient states that catheter was left out and she has been voiding normally since.     Seen today at the bedside as Nephrology is consulted for a new SIMON this admission. Drowsy and lethargic but oriented to time, place and person. However, she did not complain nausea, vomiting, diarrhea, any OTC medicine that has been recently taken. She has been on Augmentin for toe infection since 05/20/2025. She has no problem urinating, no change in color or froth. No dizziness or light headedness.  Her cr has been trending up after since 05/24 she received contrast studies.      In The ER: /53   Pulse 71   Temp 36.5 °C (97.7 °F) (Temporal)   Resp 18   Ht 1.575 m (5' 2\")   Wt 102 kg (225 lb)   SpO2 93%   BMI 41.15 kg/m²      Medical History[1]   Surgical History[2]   Family History[3]  Social History     Socioeconomic History    Marital status: Single     Spouse name: Not on file    Number of children: Not on file    Years of education: Not on file    Highest education level: Not on file   Occupational History    Not on file   Tobacco Use    Smoking status: Never     Passive exposure: Never    Smokeless tobacco: Never   Vaping Use    Vaping " status: Never Used   Substance and Sexual Activity    Alcohol use: Yes    Drug use: Never    Sexual activity: Defer   Other Topics Concern    Not on file   Social History Narrative    Not on file     Social Drivers of Health     Financial Resource Strain: Low Risk  (5/27/2025)    Overall Financial Resource Strain (CARDIA)     Difficulty of Paying Living Expenses: Not hard at all   Food Insecurity: Patient Declined (5/24/2025)    Hunger Vital Sign     Worried About Running Out of Food in the Last Year: Patient declined     Ran Out of Food in the Last Year: Patient declined   Transportation Needs: No Transportation Needs (5/27/2025)    PRAPARE - Transportation     Lack of Transportation (Medical): No     Lack of Transportation (Non-Medical): No   Physical Activity: Inactive (5/24/2025)    Exercise Vital Sign     Days of Exercise per Week: 0 days     Minutes of Exercise per Session: 0 min   Stress: Patient Declined (5/24/2025)    [employer] [place] - Occupational Stress Questionnaire     Feeling of Stress : Patient declined   Social Connections: Patient Declined (5/24/2025)    Social Connection and Isolation Panel [NHANES]     Frequency of Communication with Friends and Family: Patient declined     Frequency of Social Gatherings with Friends and Family: Patient declined     Attends Tenriism Services: Patient declined     Active Member of Clubs or Organizations: Patient declined     Attends Club or Organization Meetings: Patient declined     Marital Status: Patient declined   Intimate Partner Violence: Patient Declined (5/24/2025)    Humiliation, Afraid, Rape, and Kick questionnaire     Fear of Current or Ex-Partner: Patient declined     Emotionally Abused: Patient declined     Physically Abused: Patient declined     Sexually Abused: Patient declined   Housing Stability: Low Risk  (5/27/2025)    Housing Stability Vital Sign     Unable to Pay for Housing in the Last Year: No     Number of Times  Moved in the Last Year: 1     Homeless in the Last Year: No       Allergies[4]     Prescriptions Prior to Admission[5]     Meds:   amoxicillin-clavulanate, 1 tablet, q24h  atorvastatin, 40 mg, Nightly  carvedilol, 12.5 mg, BID  [Held by provider] gabapentin, 300 mg, BID  heparin (porcine), 7,500 Units, q8h  hydrALAZINE, 50 mg, TID  insulin lispro, 0-15 Units, TID AC  isosorbide mononitrate ER, 30 mg, Daily  lidocaine, 5 mL, Once  nystatin, 1 Application, BID  polyethylene glycol, 17 g, Daily  tamsulosin, 0.4 mg, Daily  torsemide, 40 mg, Daily  vitamin B complex-vitamin C-folic acid, 1 capsule, Daily         acetaminophen, 650 mg, q6h PRN  dextrose, 12.5 g, q15 min PRN  dextrose, 25 g, q15 min PRN  glucagon, 1 mg, q15 min PRN  glucagon, 1 mg, q15 min PRN        Vitals:    05/30/25 0813   BP: 123/53   Pulse: 71   Resp: 18   Temp: 36.5 °C (97.7 °F)   SpO2: 93%        05/28 1900 - 05/30 0659  In: -   Out: 650 [Urine:650]   Weight change:      General appearance: AAOx3. No distress  Eyes: non-icteric  Skin: no apparent rash  Heart: [id number]. no rub  Lungs: CTA bilat.  no wheezing/crackles  Abdomen: soft, nt/nd  Extremities: 2+ edema bilat  : external Louis  Neuro: No FND,  asterixis         ASSESSMENT:  Nuris Squires is a 59 y.o. 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 Zelaya, DPM for osteomyelitis of L foot status post left 4th and 5th digit metatarsal amputation and washout. Nephrology is consulted for SIMON on CKD 4.     #Simon on CKD 4  - Baseline creatinine:2.8-2.7  - Etiology: Likely MAULIK in setting of advanced CKD, Vanco associated ATN and infection might have played a role.  - UA showed:  Proteins 2+, RBCs>20, WBC>50, FeNA is 2 % suggesting intrinsic Kidney injury  - Clinical volume status:Euvolemic  - Electrolytes (Na, K, Ca, Phos) stable  - Acid base status:HAGMA, NAGMA in setting of SIMON and CKD   -UOP  documented is 550 x 24 hr  -No recent Renal US available.    KFRE 2-Year: 84.6% at 5/30/2025  6:16 AM  Calculated from:  Serum Creatinine: 6.69 mg/dL at 5/30/2025  6:16 AM  Urine Albumin Creatinine Ratio: 1,471 ug/mg Creat at 5/29/2025  6:26 PM  Age: 59 years  Sex: Female at 5/30/2025  6:16 AM  Has CKD-3 to CKD-5: Yes     #Drowsy -likely from Gabapentin in setting of poor kidney function    Recommendations:  - Indication for dialysis:  No.Will eval daily if needed but may need close follow up with nephrology and dialysis education as this is second admission in 1 year making her high risk for progression.  -Hold Gabapentin for now.  -Okay to proceed with adhikari placement.  -Repeat Renal Ultrasound.  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 stage IV 01/06/2025    Personal history of other diseases of the circulatory system     History of hypertension    Personal history of other endocrine, nutritional and metabolic disease     History of hyperlipidemia    Personal history of other endocrine, nutritional and metabolic disease     History of diabetes mellitus   [2]   Past Surgical History:  Procedure Laterality Date    CARDIAC CATHETERIZATION N/A 1/8/2025    Procedure: Right Heart Cath;  Surgeon: Migel Stanton MD;  Location: [place] [address] Cardiac [facility];  Service: Cardiovascular;  Laterality: N/A;    CARDIAC CATHETERIZATION N/A 1/17/2025    Procedure: Right Heart Cath;  Surgeon: Ana Lea MD;  Location: [place] [address] Cardiac [facility] Lab;  Service: Cardiovascular;   Laterality: N/A;    CT ANGIO NECK  2023    CT NECK ANGIO W AND WO IV CONTRAST 2023 DOCTOR OFFICE [facility]GAC[facility]    CT HEAD ANGIO W AND WO IV CONTRAST  2023    CT HEAD ANGIO W AND WO IV CONTRAST 2023 DOCTOR OFFICE LEGACY    OTHER SURGICAL HISTORY  10/21/2020    Toe amputation   [3]   Family History  Problem Relation Name Age of Onset    Alzheimer's disease Mother      Diabetes Mother      Lung cancer Mother      Diabetes Father      Lung cancer Father      Pancreatic cancer Father      Diabetes Sister      Lung cancer Sister     [4] No Known Allergies  [5]   Medications Prior to Admission   Medication Sig Dispense Refill Last Dose/Taking    carvedilol (Coreg) 12.5 mg tablet Take 1 tablet (12.5 mg) by mouth 2 times a day. 60 tablet 1 Taking    amoxicillin-clavulanate (Augmentin) 875-125 mg tablet Take 1 tablet (875 mg) by mouth 2 times a day for 10 days. 20 tablet 0     atorvastatin (Lipitor) 40 mg tablet Take 1 tablet (40 mg) by mouth once daily. 30 tablet 1     B complex-vitamin C-folic acid (Nephro-Duke) 0.8 mg tablet Take 1 tablet by mouth once daily. 90 tablet 0     blood-glucose sensor (FreeStyle Remington 3 Sensor) device Use to monitor blood glucose. Change sensor every 14 days. 2 each 11     [] ferrous sulfate tablet Take 1 tablet (325 mg) by mouth once daily with breakfast. 90 tablet 0     FreeStyle Remington 3 [place] misc Use as instructed to monitor blood glucose. 1 each 0     gabapentin (Neurontin) 300 mg capsule Take 1 capsule (300 mg) by mouth 2 times a day. 180 capsule 3     hydrALAZINE (Apresoline) 50 mg tablet Take 1 tablet (50 mg) by mouth 3 times a day. 270 tablet 3     insulin glargine (Basaglar KwikPen U-100 Insulin) 100 unit/mL (3 mL) pen Inject 15 Units under the skin once daily in the morning. Take as directed per insulin instructions.       insulin lispro (Admelog SoloStar U-100 Insulin) 100 unit/mL injection Inject per sliding scale instructions under the skin 3 times a day with  "meals. (Max daily dose 70 units) 15 mL 1     isosorbide mononitrate ER (Imdur) 30 mg 24 hr tablet Take 1 tablet (30 mg) by mouth once daily. Do not crush or chew. 30 tablet 3     OneTouch Delica Plus Lancet 30 gauge misc USE TO TEST BLOOD SUGAR AS DIRECTED 200 each 0     OneTouch Ultra Test strip Use 1 strip BID to check glucose 200 strip 1     OneTouch Ultra2 Meter misc use 1 to 2 times daily 1 each 0     oxyCODONE (Roxicodone) 5 mg immediate release tablet Take 1 tablet (5 mg) by mouth every 4 hours if needed for severe pain (7 - 10) or moderate pain (4 - 6). 42 tablet 0     pen needle, diabetic 32 gauge x 5/32\" needle Use four times a day with insulin use 360 each 1     [] sodium hypochlorite (Dakin's HALF-Strength) 0.25 % external solution Apply topically 1 time for 1 dose. Use for daily dressing changes for left foot 473 mL 2     tirzepatide (Mounjaro) 5 mg/0.5 mL pen injector Inject 5 mg under the skin 1 (one) time per week. 2 mL 1     torsemide (Demadex) 20 mg tablet Take 2 tablets (40 mg) by mouth once daily. 120 tablet 0      "

## 2025-05-30 NOTE — PROGRESS NOTES
Spiritual Care Visit  Spiritual Care Request    Reason for Visit:  Routine Visit: Introduction     Request Received From:  Referral From: Patient    Focus of Care:  Visited With: Patient       Refer to :  Referral To:        Care Provided:  Intended Effects: Aligning care plan with patient's values, Convey a calming presence, Tiffany affirmation, Promote sense of peace  Methods: Encourage self care, Encourage sharing of feelings, Explore tiffany and values, Offer support  Interventions: Acknowledge current situation, Active listening, [place]    Outcome:  Outcome of Spiritual Care Visit: [place], Affirmation     Spiritual Care Annotation    Annotation:   met with patient to offer spiritual and emotional support. Patient shared about her health, tiffany, and support system.  actively listened, affirmed patient's thoughts and feelings, and offered requested prayer. Patient expressed appreciation for support.     Signed: Jak Galicia, Clinical Care  '

## 2025-05-30 NOTE — PROGRESS NOTES
Vancomycin Dosing by Pharmacy- Cessation of Therapy    Consult to pharmacy for vancomycin dosing has been discontinued by the prescriber, pharmacy will sign off at this time.    Please call pharmacy if there are further questions or re-enter a consult if vancomycin is resumed.     Odette Squires, PharmD

## 2025-05-30 NOTE — PROGRESS NOTES
Nuris Squires is a 59 y.o. female on day 6 of admission presenting with Left foot infection.        Objective     Last Recorded Vitals  /58 (BP Location: Left arm, Patient Position: Lying)   Pulse 67   Temp 36.3 °C (97.3 °F) (Temporal)   Resp 17   Wt 102 kg (225 lb)   SpO2 93%   Intake/Output last 3 Shifts:    Intake/Output Summary (Last 24 hours) at 5/30/2025 1943  Last data filed at 5/30/2025 1638  Gross per 24 hour   Intake --   Output 700 ml   Net -700 ml       Admission Weight  Weight: 102 kg (225 lb) (05/24/25 1307)    Daily Weight  05/24/25 : 102 kg (225 lb)    Image Results  US renal complete  Narrative: Interpreted By:  Kapil Bass and Hofer Lindsay   STUDY:  US RENAL COMPLETE;  5/30/2025 12:58 pm      INDICATION:  Signs/Symptoms:VENKAT on CKD, r/o obstruction.          COMPARISON:  Renal ultrasound 01/15/2025.      ACCESSION NUMBER(S):  [id number]      ORDERING CLINICIAN:  ALEX SCHROEDER      TECHNIQUE:  Multiple images of the kidneys were obtained.      FINDINGS:  RIGHT KIDNEY:  The right kidney measures 10.9 cm in length. The renal cortex is  mildly echogenic. No hydronephrosis is present; no evidence of  nephrolithiasis.      LEFT KIDNEY:  Extremely limited evaluation of the left kidney due to overlying  bowel gas. Nephrolithiasis.      BLADDER:  Small amount of debris layering within the right side of the bladder.  No evidence of urinary bladder wall thickening.      Impression: 1. The right kidney is mildly echogenic, which can be seen in the  setting of medical renal disease. No hydronephrosis on the right.  2. Extremely limited evaluation of the left kidney due to overlying  bowel gas.  3. Small amount of debris layering within the urinary bladder without  evidence of urinary bladder wall thickening or pericystic fluid.  Findings are nonspecific, but could be seen in the setting of acute  cystitis.      I personally reviewed the images/study and resident's interpretation  and I  agree with the findings as stated by Yessy Joshua MD (resident  radiologist). This study was analyzed and interpreted at [facility], [place], [place].      MACRO:  None          Signed by: Kapil Bass 5/30/2025 1:43 PM  Dictation workstation:   [id number]      Physical Exam  Constitutional: Pleasant elderly female, alert active, cooperative not in acute distress  Eyes: PERRLA, clear sclera  ENMT: Moist mucosal membranes, no exudate  Head / Neck: Atraumatic, normocephalic, supple neck, JVP not visualized  Lungs: Patent airways, CTABL  Heart: RRR, S1S2, no murmurs appreciated, palpable pulses in all extremities  GI: Soft, NT, ND, bowel sounds present in all quadrants  MSK: Moves all extremities freely, no restriction  of ROM, no joint edema  Extremities: Left foot covered with dressing dry and intact,  Midline insertion in the right arm, no peripheral edema  : No Louis catheter inserted  Breast: Deferred  Neurological: AAO x 3 to person, place and date, facial muscles symmetrical, sensation intact, strength 4/4, no acute focal neurological deficits appreciated  Psychological: Appropriate mood and behavior    Relevant Results                Scheduled medications  Scheduled Medications[1]  Continuous medications  Continuous Medications[2]  PRN medications  PRN Medications[3]    Assessment & Plan  Left foot infection    Chronic ulcer of left foot with fat layer exposed (Multi)    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  Osteomyelitis of the left foot, status post left 4th and 5th digit metatarsal amputation and washout,  podiatry following.              Subjective        5/30  -Per ID team: Discontinued vancomycin and discontinue Zosyn. Started Augmentin 500 mg daily (renal dosing) 2-4 weeks.   -Placed Louis giving persistent retention.   -Looks sad and depressed as renal team told she may not qualify for HD, Spiritual Care requested.    -PET and Music therapy requested.   -Avoid renal toxic meds  -Normotensive VSS HDS          AP      #VENKAT on CKD, stage 5, worsening.  -She was CKD 3B vs IV Jan 2025. BS Cr 1.68 --> 5 now   -Requested UA, urine c/s, urine lytes, C3, C4   -Renal consulted.  -I&O.  -Avoid Renal toxic med.  -Switch the enoxaparin to heparin 7500 subcutaneous q 8 hours  -Daily RFP, Renal team to continue to follow    #Urinary retention S/p surgery vs Med induced (Oxy).   -TOV with bladder scan failed, stopped Oxy.   -Avoid constipation. Renal US requested    -Louis was placed 5/30, Held tamsulosin      #Osteomyelitis of L foot  #Diabetic gas forming infection of left foot   #Status post podiatry intervention with  L 4th and 5th metatarsal amputation and washout  on 5/25  -Wound cultures sent    -Podiatry consulted: s/p left foot 4th and 5th digit amputation, scheduled for Left foot 5/27:  Delayed Primary Closure, Bone cortex Excision, Reverse Sural Flap 5/27/25.   - ID consulted for further information on culutres  - pt/ot, mobilize pt as tolerated, NWB to LLE.    -Post op shoe to the left foot requested   -ID consulted to determine abx duration.  -She would like to know her options for nursing home instead of going home. PTOT requested.   -Reports  pain is controlled.  -Wound care instruction requested: betadine soaked Adaptic, 4 x 4's ABD pad, kerlix, ace wrap. Pt will follow with Dr. Uribe at [facility] on Monday, appt is already scheduled    -Per ID team: Discontinued vancomycin and discontinue Zosyn. Started Augmentin 500 mg daily (renal dosing) 2-4 weeks.     #Acute on chronic anemia. Stable         #HFpEF/HTN  -  compensated     #T2DM  #Hyperglycemic, escalated ISS#3.   -SSI while inpatient, carb controlled diet   -hypoglycemia protocol                Diet  - Diabetic Renal   - Dispo SNF eventually  TCC was updated      ADOD > 2 days     ADOD in 2 days      Mikala Escoto MD           [1] amoxicillin-clavulanate, 1 tablet, oral, q24h  atorvastatin, 40 mg, oral, Nightly  carvedilol, 12.5 mg, oral, BID  [Held by provider] gabapentin, 300 mg, oral, BID  heparin (porcine), 7,500 Units, subcutaneous, q8h  hydrALAZINE, 50 mg, oral, TID  insulin lispro, 0-15 Units, subcutaneous, TID AC  isosorbide mononitrate ER, 30 mg, oral, Daily  lidocaine, 5 mL, infiltration, Once  nystatin, 1 Application, Topical, BID  polyethylene glycol, 17 g, oral, Daily  tamsulosin, 0.4 mg, oral, Daily  torsemide, 40 mg, oral, Daily  vitamin B complex-vitamin C-folic acid, 1 capsule, oral, Daily     [2]    [3] PRN medications: acetaminophen, dextrose, dextrose, glucagon, glucagon

## 2025-05-30 NOTE — CARE PLAN
The patient's goals for the shift include Patient would like to work with Pt/Ot    The clinical goals for the shift include Patient will remain safe and free of injury throughout shift.    Over the shift, the patient made progress towards the following goals.     Problem: Pain - Adult  Goal: Verbalizes/displays adequate comfort level or baseline comfort level  Outcome: Progressing     Problem: Safety - Adult  Goal: Free from fall injury  Outcome: Progressing     Problem: Discharge Planning  Goal: Discharge to home or other facility with appropriate resources  Outcome: Progressing     Problem: Chronic Conditions and Co-morbidities  Goal: Patient's chronic conditions and co-morbidity symptoms are monitored and maintained or improved  Outcome: Progressing     Problem: Nutrition  Goal: Nutrient intake appropriate for maintaining nutritional needs  Outcome: Progressing     Problem: Diabetes  Goal: Achieve decreasing blood glucose levels by end of shift  Outcome: Progressing  Goal: Increase stability of blood glucose readings by end of shift  Outcome: Progressing  Goal: Decrease in ketones present in urine by end of shift  Outcome: Progressing  Goal: Maintain electrolyte levels within acceptable range throughout shift  Outcome: Progressing  Goal: Maintain glucose levels >70mg/dl to <250mg/dl throughout shift  Outcome: Progressing  Goal: No changes in neurological exam by end of shift  Outcome: Progressing  Goal: Learn about and adhere to nutrition recommendations by end of shift  Outcome: Progressing  Goal: Vital signs within normal range for age by end of shift  Outcome: Progressing  Goal: Increase self care and/or family involovement by end of shift  Outcome: Progressing  Goal: Receive DSME education by end of shift  Outcome: Progressing     Problem: Skin  Goal: Decreased wound size/increased tissue granulation at next dressing change  Outcome: Progressing  Goal: Participates in plan/prevention/treatment  measures  Outcome: Progressing  Goal: Prevent/manage excess moisture  Outcome: Progressing  Goal: Prevent/minimize sheer/friction injuries  Outcome: Progressing  Goal: Promote/optimize nutrition  Outcome: Progressing  Goal: Promote skin healing  Outcome: Progressing     Problem: Fall/Injury  Goal: Not fall by end of shift  Outcome: Progressing  Goal: Be free from injury by end of the shift  Outcome: Progressing  Goal: Verbalize understanding of personal risk factors for fall in the hospital  Outcome: Progressing  Goal: Verbalize understanding of risk factor reduction measures to prevent injury from fall in the home  Outcome: Progressing  Goal: Use assistive devices by end of the shift  Outcome: Progressing  Goal: Pace activities to prevent fatigue by end of the shift  Outcome: Progressing

## 2025-05-30 NOTE — PROGRESS NOTES
Nuris Squires is a 59 y.o. female on day 5 of admission presenting with Left foot infection.    Subjective   Interval History:   Patient seen during mid afternoon rounds.  Patient's  not present.  Patient's supine in bed and in no acute distress.  Patient was eating some fresh fruit.  Pain tolerated      Objective   Range of Vitals (last 24 hours)  Heart Rate:  [65-79]   Temp:  [36 °C (96.8 °F)-36.6 °C (97.9 °F)]   Resp:  [16-17]   BP: (111-133)/(49-88)   SpO2:  [90 %-98 %]   Daily Weight  05/24/25 : 102 kg (225 lb)    Body mass index is 41.15 kg/m².    Physical Exam  Alert and oriented  Pleasant and interactive  Left foot still in surgical dressing  Anterior chest clear  S1, S2    Antibiotics  amoxicillin-clavulanate - 875-125 mg  piperacillin-tazobactam - 2.25 gram/50 mL  vancomycin    Relevant Results  Labs  Results from last 72 hours   Lab Units 05/29/25  0542 05/28/25  0545 05/27/25  0408   WBC AUTO x10*3/uL 9.8 10.8 9.3   HEMOGLOBIN g/dL 8.2* 7.9* 7.3*   HEMATOCRIT % 27.9* 25.2* 23.0*   PLATELETS AUTO x10*3/uL 275 253 243   NEUTROS PCT AUTO % 66.5 68.0  --    LYMPHS PCT AUTO % 20.6 18.7  --    MONOS PCT AUTO % 8.1 8.4  --    EOS PCT AUTO % 4.0 3.8  --      Results from last 72 hours   Lab Units 05/29/25  0542 05/28/25  0545 05/27/25  0408   SODIUM mmol/L 134* 131* 136   POTASSIUM mmol/L 4.9 4.8 4.2   CHLORIDE mmol/L 103 102 105   CO2 mmol/L 19* 18* 23   BUN mg/dL 45* 43* 40*   CREATININE mg/dL 6.35* 5.38* 4.62*   GLUCOSE mg/dL 171* 238* 242*   CALCIUM mg/dL 8.0* 7.5* 7.8*   ANION GAP mmol/L 17 16 12   EGFR mL/min/1.73m*2 7* 9* 10*   PHOSPHORUS mg/dL 7.2* 6.0* 5.3*     Results from last 72 hours   Lab Units 05/29/25  0542 05/28/25  0545 05/27/25  0408   ALBUMIN g/dL 2.7* 2.5* 2.5*     Estimated Creatinine Clearance: 10.5 mL/min (A) (by C-G formula based on SCr of 6.35 mg/dL (H)).  C-REACTIVE PROTEIN   Date Value Ref Range Status   02/05/2025 6.7 <8.0 mg/L Final     C-Reactive Protein   Date Value Ref  Range Status   05/24/2025 2.21 (H) <1.00 mg/dL Final   02/05/2024 1.29 (H) <1.00 mg/dL Final     Microbiology  Susceptibility data from last 14 days.  Collected Specimen Info Organism   05/25/25 Swab from SOFT TISSUE RESECTION Proteus mirabilis     Mixed Skin Microorganisms          Outpatient 5/23/2025 culture (Dr. Zelaya, [facility])  Test Status:       Final     Specimen Source:   Foot, left     Specimen Quality:  Adequate     Result:            Moderate growth of Proteus mirabilis     COMMENT:           Skin breanna also present. =                             P.mirabilis                             ----------------                                              INT   STEVEN      AMOX/CLAVULANATE         S     8      AMP/SULBACTAM                S     8      CEFAZOLIN                           R     8       CEFEPIME                        S     <=0.12     CEFTAZIDIME                  S     <=1     CEFTRIAXONE                         S     <=0.25     CIPROFLOXACIN                      R     >=4     GENTAMICIN                           S     <=1     MEROPENEM                           S     <=0.25     PIP/TAZOBACTAM                   S     <=4     TRIMETHOPRIM/SULFA           R     >=320      Assessment/Plan   1-year-old female with a history of peripheral arterial disease and diabetes who presents with chronic left plantar foot ulcer.  Large wound occupying majority of the midfoot.  Patient went on for two-stage surgical debridement.  On 5/25/2025 patient had initial  partial TMA and resection of 4th and 5th  toes and on 5/27/2025 patient had completion of TMA involving metatarsals #1 2 and 3.  The left midfoot was left open and dressed until formal closure later.     Post TMA and post lavage culture pending.  5/23/25 outpatient D.P.M. foot culture showed Proteus mirabilis susceptible to Augmentin but resistant to cefazolin, Cipro and TMP-SMX.  May need to use this as an indicator for antibiotic treatment but limited oral  option of Augmentin.     Theoretically patient would not need any antibiotics now that there is definitive source control.  However in general treat patients with antibiotics until there is sufficient healing and granulation especially in settings with diabetes and peripheral vascular disease where healing may be delayed.     05/29/2025 Update:  5/25/2025 culture (surgical debridement #1) processing delayed until 5/28/2025, and preliminary result shows 1 colony of Proteus mirabilis (antibiotics pending but likely similar to isolate from 5/23/2025 5/27/2025 post amputation and post lavage culture FINAL NEGATIVE  Excellent source control.  However would still treat with antibiotics for 2 to 4 weeks to facilitate healing which will be delayed in the setting of peripheral vascular disease.  Proteus isolate susceptible to Augmentin.  Would reassess after 2 weeks of therapy and extend to 4 weeks therapy if necessary.  Will defer to podiatry follow-up for that decision but Dr. Walden would be available for consultation if if needed     Recommendations:  1.  Discontinue vancomycin and discontinue Zosyn    2.  Begin Augmentin 500 mg p.o. ONCE daily (renal dosing)    3.  Would reassess after 2 weeks and could finish Augmentin or extend to a total of 4 weeks of therapy if needed.   I will defer to Podiatry would will follow patient post op   Dr. Walden available to advise if needed    4.  Follow-up with podiatry, nephrology and PCP    5.  ID follow-up not necessary at this time although Dr. Walden available to advise if needed     Discussed with patient who expressed her understanding of the plan     ID will sign off and will not be following the patient    Scooby Walden MD  ID Staff  I spent 45 minutes in the professional and overall care of this patient.

## 2025-05-31 ENCOUNTER — APPOINTMENT (OUTPATIENT)
Dept: RADIOLOGY | Facility: HOSPITAL | Age: 60
End: 2025-05-31
Payer: COMMERCIAL

## 2025-05-31 LAB
ALBUMIN SERPL BCP-MCNC: 2.6 G/DL (ref 3.4–5)
ANION GAP SERPL CALC-SCNC: 16 MMOL/L (ref 10–20)
BASOPHILS # BLD AUTO: 0.05 X10*3/UL (ref 0–0.1)
BASOPHILS NFR BLD AUTO: 0.6 %
BUN SERPL-MCNC: 53 MG/DL (ref 6–23)
CALCIUM SERPL-MCNC: 7.7 MG/DL (ref 8.6–10.6)
CHLORIDE SERPL-SCNC: 106 MMOL/L (ref 98–107)
CO2 SERPL-SCNC: 20 MMOL/L (ref 21–32)
CREAT SERPL-MCNC: 7.05 MG/DL (ref 0.5–1.05)
EGFRCR SERPLBLD CKD-EPI 2021: 6 ML/MIN/1.73M*2
EOSINOPHIL # BLD AUTO: 0.27 X10*3/UL (ref 0–0.7)
EOSINOPHIL NFR BLD AUTO: 3.4 %
ERYTHROCYTE [DISTWIDTH] IN BLOOD BY AUTOMATED COUNT: 15 % (ref 11.5–14.5)
GLUCOSE BLD MANUAL STRIP-MCNC: 183 MG/DL (ref 74–99)
GLUCOSE BLD MANUAL STRIP-MCNC: 194 MG/DL (ref 74–99)
GLUCOSE BLD MANUAL STRIP-MCNC: 194 MG/DL (ref 74–99)
GLUCOSE BLD MANUAL STRIP-MCNC: 225 MG/DL (ref 74–99)
GLUCOSE SERPL-MCNC: 180 MG/DL (ref 74–99)
HCT VFR BLD AUTO: 25.3 % (ref 36–46)
HGB BLD-MCNC: 7.6 G/DL (ref 12–16)
IMM GRANULOCYTES # BLD AUTO: 0.03 X10*3/UL (ref 0–0.7)
IMM GRANULOCYTES NFR BLD AUTO: 0.4 % (ref 0–0.9)
LYMPHOCYTES # BLD AUTO: 1.92 X10*3/UL (ref 1.2–4.8)
LYMPHOCYTES NFR BLD AUTO: 24 %
MCH RBC QN AUTO: 29.9 PG (ref 26–34)
MCHC RBC AUTO-ENTMCNC: 30 G/DL (ref 32–36)
MCV RBC AUTO: 100 FL (ref 80–100)
MONOCYTES # BLD AUTO: 0.61 X10*3/UL (ref 0.1–1)
MONOCYTES NFR BLD AUTO: 7.6 %
NEUTROPHILS # BLD AUTO: 5.13 X10*3/UL (ref 1.2–7.7)
NEUTROPHILS NFR BLD AUTO: 64 %
NRBC BLD-RTO: 0 /100 WBCS (ref 0–0)
PHOSPHATE SERPL-MCNC: 6.9 MG/DL (ref 2.5–4.9)
PLATELET # BLD AUTO: 320 X10*3/UL (ref 150–450)
POTASSIUM SERPL-SCNC: 4.5 MMOL/L (ref 3.5–5.3)
RBC # BLD AUTO: 2.54 X10*6/UL (ref 4–5.2)
SODIUM SERPL-SCNC: 137 MMOL/L (ref 136–145)
WBC # BLD AUTO: 8 X10*3/UL (ref 4.4–11.3)

## 2025-05-31 PROCEDURE — 2500000001 HC RX 250 WO HCPCS SELF ADMINISTERED DRUGS (ALT 637 FOR MEDICARE OP): Performed by: STUDENT IN AN ORGANIZED HEALTH CARE EDUCATION/TRAINING PROGRAM

## 2025-05-31 PROCEDURE — 2500000004 HC RX 250 GENERAL PHARMACY W/ HCPCS (ALT 636 FOR OP/ED): Performed by: STUDENT IN AN ORGANIZED HEALTH CARE EDUCATION/TRAINING PROGRAM

## 2025-05-31 PROCEDURE — 2500000001 HC RX 250 WO HCPCS SELF ADMINISTERED DRUGS (ALT 637 FOR MEDICARE OP)

## 2025-05-31 PROCEDURE — 2500000002 HC RX 250 W HCPCS SELF ADMINISTERED DRUGS (ALT 637 FOR MEDICARE OP, ALT 636 FOR OP/ED): Performed by: STUDENT IN AN ORGANIZED HEALTH CARE EDUCATION/TRAINING PROGRAM

## 2025-05-31 PROCEDURE — 36415 COLL VENOUS BLD VENIPUNCTURE: CPT | Performed by: STUDENT IN AN ORGANIZED HEALTH CARE EDUCATION/TRAINING PROGRAM

## 2025-05-31 PROCEDURE — 1100000001 HC PRIVATE ROOM DAILY

## 2025-05-31 PROCEDURE — 76770 US EXAM ABDO BACK WALL COMP: CPT

## 2025-05-31 PROCEDURE — 80069 RENAL FUNCTION PANEL: CPT

## 2025-05-31 PROCEDURE — 76770 US EXAM ABDO BACK WALL COMP: CPT | Performed by: RADIOLOGY

## 2025-05-31 PROCEDURE — 82947 ASSAY GLUCOSE BLOOD QUANT: CPT

## 2025-05-31 PROCEDURE — 99232 SBSQ HOSP IP/OBS MODERATE 35: CPT | Performed by: STUDENT IN AN ORGANIZED HEALTH CARE EDUCATION/TRAINING PROGRAM

## 2025-05-31 PROCEDURE — 85025 COMPLETE CBC W/AUTO DIFF WBC: CPT | Performed by: STUDENT IN AN ORGANIZED HEALTH CARE EDUCATION/TRAINING PROGRAM

## 2025-05-31 PROCEDURE — 99233 SBSQ HOSP IP/OBS HIGH 50: CPT | Performed by: INTERNAL MEDICINE

## 2025-05-31 RX ADMIN — TORSEMIDE 40 MG: 20 TABLET ORAL at 09:17

## 2025-05-31 RX ADMIN — HEPARIN SODIUM 7500 UNITS: 5000 INJECTION INTRAVENOUS; SUBCUTANEOUS at 18:54

## 2025-05-31 RX ADMIN — ISOSORBIDE MONONITRATE 30 MG: 30 TABLET, EXTENDED RELEASE ORAL at 09:14

## 2025-05-31 RX ADMIN — HYDRALAZINE HYDROCHLORIDE 50 MG: 50 TABLET ORAL at 09:14

## 2025-05-31 RX ADMIN — INSULIN LISPRO 3 UNITS: 100 INJECTION, SOLUTION INTRAVENOUS; SUBCUTANEOUS at 09:50

## 2025-05-31 RX ADMIN — ATORVASTATIN CALCIUM 40 MG: 40 TABLET, FILM COATED ORAL at 21:00

## 2025-05-31 RX ADMIN — AMOXICILLIN AND CLAVULANATE POTASSIUM 1 TABLET: 500; 125 TABLET, FILM COATED ORAL at 12:37

## 2025-05-31 RX ADMIN — INSULIN LISPRO 3 UNITS: 100 INJECTION, SOLUTION INTRAVENOUS; SUBCUTANEOUS at 17:46

## 2025-05-31 RX ADMIN — HEPARIN SODIUM 7500 UNITS: 5000 INJECTION INTRAVENOUS; SUBCUTANEOUS at 12:37

## 2025-05-31 RX ADMIN — CARVEDILOL 12.5 MG: 12.5 TABLET, FILM COATED ORAL at 21:00

## 2025-05-31 RX ADMIN — ASCORBIC ACID, THIAMINE MONONITRATE,RIBOFLAVIN, NIACINAMIDE, PYRIDOXINE HYDROCHLORIDE, FOLIC ACID, CYANOCOBALAMIN, BIOTIN, CALCIUM PANTOTHENATE, 1 CAPSULE: 100; 1.5; 1.7; 20; 10; 1; 6000; 150000; 5 CAPSULE, LIQUID FILLED ORAL at 09:14

## 2025-05-31 RX ADMIN — HEPARIN SODIUM 7500 UNITS: 5000 INJECTION INTRAVENOUS; SUBCUTANEOUS at 03:07

## 2025-05-31 RX ADMIN — HYDRALAZINE HYDROCHLORIDE 50 MG: 50 TABLET ORAL at 21:00

## 2025-05-31 RX ADMIN — HYDRALAZINE HYDROCHLORIDE 50 MG: 50 TABLET ORAL at 15:13

## 2025-05-31 RX ADMIN — CARVEDILOL 12.5 MG: 12.5 TABLET, FILM COATED ORAL at 09:14

## 2025-05-31 RX ADMIN — NYSTATIN 1 APPLICATION: 100000 POWDER TOPICAL at 10:34

## 2025-05-31 ASSESSMENT — COGNITIVE AND FUNCTIONAL STATUS - GENERAL
HELP NEEDED FOR BATHING: A LITTLE
TOILETING: A LITTLE
MOBILITY SCORE: 19
TOILETING: A LITTLE
DRESSING REGULAR LOWER BODY CLOTHING: A LITTLE
MOVING TO AND FROM BED TO CHAIR: A LITTLE
WALKING IN HOSPITAL ROOM: A LITTLE
WALKING IN HOSPITAL ROOM: A LITTLE
DAILY ACTIVITIY SCORE: 21
STANDING UP FROM CHAIR USING ARMS: A LITTLE
CLIMB 3 TO 5 STEPS WITH RAILING: A LITTLE
TURNING FROM BACK TO SIDE WHILE IN FLAT BAD: A LITTLE
STANDING UP FROM CHAIR USING ARMS: A LITTLE
TURNING FROM BACK TO SIDE WHILE IN FLAT BAD: A LITTLE
DRESSING REGULAR LOWER BODY CLOTHING: A LITTLE
CLIMB 3 TO 5 STEPS WITH RAILING: A LITTLE
HELP NEEDED FOR BATHING: A LITTLE
MOVING TO AND FROM BED TO CHAIR: A LITTLE

## 2025-05-31 ASSESSMENT — PAIN SCALES - GENERAL: PAINLEVEL_OUTOF10: 0 - NO PAIN

## 2025-05-31 NOTE — PROGRESS NOTES
Podiatry Medicine & Surgery- Progress Note    Subjective   INTERVAL HPI: Nuris Squires  is a 59 y.o. female on day 7 of admission with principle problem of Left foot infection. Pt was seen at bedside. Pain well controlled. Patient denies any constitutional symptoms. No other pedal complaints.     Medical History[1]  Surgical History[2]  Family History[3]  Social History[4]   Prescriptions Prior to Admission[5]   Allergies[6]     Medications:  Scheduled Medications[7]  Continuous Medications[8]  PRN Medications[9]    Allergies as of 05/24/2025    (No Known Allergies)            Objective   PHYSICAL EXAM:  Vitals:    05/31/25 1444   BP: 141/64   Pulse: 71   Resp: 18   Temp: 36.7 °C (98.1 °F)   SpO2: 93%     Body mass index is 41.15 kg/m².    Patient is AOx3 and in no acute distress. Patient is alert and cooperative. Sitting comfortably in bed with dressing clean, dry and intact.      Vascular: Palpable DP/PT pulses B/L. Moderate pitting edema noted B/L. Hair growth absent B/L. CFT<5 to B/L hallux. Temperature is warm to cool from tibial tuberosity to distal digits B/L. No lymphatic streaking noted B/L.     Musculoskeletal: Gross active and passive ROM intact to age and activity level. Moves all extremities spontaneously. No pain to palpation at feet B/L.      Neurological: Intact light touch sensation B/L. Pain stimuli diminished B/L. Denies any numbness, burning or tingling.     Dermatologic: 1-5 R are within normal limits for thickness and length B/L. Skin appears diffusely xerotic B/L.      Wound: Left TMA Site   Incision site is well coapted with retention sutures and surgical staples.   Mild serosanguinous drainage.   Mild selene-wound maceration at central area of incision and lateral area..   no selene-wound erythema.   No evidence of ascending cellulitis or lymphangitis.  No  palpable fluctuance. No malodor. No increased warmth.   No probe to bone or deep structures within the wound base.   No undermining. Skin  edges irregular but intact.  Overall wound has improved and incision is healing well.     LABS:   Lab Results   Component Value Date    HGBA1C 7.3 (A) 05/20/2025    CRP 2.21 (H) 05/24/2025    SEDRATE 42 (H) 05/24/2025        Results from last 7 days   Lab Units 05/31/25  0602 05/30/25  0616 05/29/25  0542   WBC AUTO x10*3/uL 8.0 9.4 9.8   RBC AUTO x10*6/uL 2.54* 2.72* 2.77*   HEMOGLOBIN g/dL 7.6* 7.9* 8.2*   HEMATOCRIT % 25.3* 26.8* 27.9*     Results from last 7 days   Lab Units 05/31/25  0602 05/30/25  0616 05/29/25  0542   GLUCOSE mg/dL 180* 199* 171*   SODIUM mmol/L 137 137 134*   POTASSIUM mmol/L 4.5 4.7 4.9   CHLORIDE mmol/L 106 105 103   CO2 mmol/L 20* 19* 19*   BUN mg/dL 53* 55* 45*   CREATININE mg/dL 7.05* 6.69* 6.35*   CALCIUM mg/dL 7.7* 7.6* 8.0*   PHOSPHORUS mg/dL 6.9* 7.0* 7.2*       Cultures  Susceptibility data from last 90 days.  Collected Specimen Info Organism Amoxicillin/Clavulanate Ampicillin Ampicillin/Sulbactam Cefazolin Ceftriaxone Ciprofloxacin Gentamicin Levofloxacin Piperacillin/Tazobactam Tetracycline Trimethoprim/Sulfamethoxazole   05/25/25 Swab from SOFT TISSUE RESECTION Proteus mirabilis  S  R  S  I  S  R  S  R  S  R  R     Staphylococcus aureus                Mixed Skin Microorganisms                   IMAGING REVIEW:  XR foot left 3+ views 05/23/2025  Impression  New bony erosive changes at the 4th proximal phalanx and metatarsal  head highly concerning for osteomyelitis.     Marked soft tissue swelling with possible soft tissue gas which could  be gas producing soft tissue infection.    MR foot left w and wo IV contrast 05/24/2025  FINDINGS:  Postsurgical changes from prior 1st digit interphalangeal and 2nd  digit MTP joint amputations are seen.    Soft tissues: There is a plantar soft tissue ulcer at the level of  the  4th metatarsophalangeal joint with associated subjacent soft  tissue edema and enhancement, suggestive of cellulitis. Questionable  fluid collection versus confluent  edema is seen in the plantar soft  tissues at the level of the 3rd mid metatarsal measuring 1.0 x 0.4 x  1.0 cm.    Bones:  There is STIR hyperintensity and loss of normal T1 marrow  signal involving approximate of the distal half of the 4th metatarsal  and the majority of the 4th proximal phalanx with associated osseous  irregularity at the joint. There is abnormal marrow signal the 4th  middle phalanx on STIR images. Additionally there is STIR  hyperintensity of the 5th proximal and middle phalanges with subtle  loss of normal T1 marrow signal (series 8, image 21). There is marrow  edema of the 5th metatarsal head. Mild patchy marrow edema involving  the base of the 3rd proximal phalanx. Likely pathologic fracture of  the 4th metatarsal neck.    Muscles:  Edema and atrophy of the plantar foot musculature,  compatible with diabetic myopathy and/or myositis.    Miscellaneous:  Visualized tendons and Lisfranc ligament are grossly  intact.    Impression  1. Osteomyelitis of the 4th metatarsal, 4th proximal phalanx, and  high likelihood of osteomyelitis of the 4th middle phalanx, as above.  Pathologic fracture of 4th metatarsal neck.  2. Osteomyelitis of the 5th proximal and middle phalanges, as above.  High likelihood of osteolysis of the 5th metatarsal head.  3. High likelihood of osteomyelitis 3rd proximal phalangeal base.  4. Multifocal soft tissue ulceration, cellulitis, and likely small  subcutaneous abscess, as above.  5. Postsurgical changes from prior 1st digit interphalangeal and 2nd  digit MTP joint amputations      I personally reviewed the images/study and I agree with the resident  Rubio Toledo's findings as stated. This study was interpreted  at [facility], [place], [place].         Assessment/Plan   ASSESSMENT & PLAN:  Shaw is a 59 y.o. female  who presents for Left foot infection, is on day 7 of admission.    #Osteomyelitis of L foot  #Diabetic foot infection,  recurrent  #T2DM   S/P Left Open TMA POD#4 (DOS: 5/25/25) and Left Delayed primary closure with lower extremity debridement POD#2 (5/27/25) with Dr. Ramos   - Patient was seen and evaluated; all findings were discussed and all questions were answered to patient's satisfaction.  - Charts, labs, vitals and imaging all reviewed.   - Imaging: see above attached. Osteomyelitis of 3rd and 4th metatarsal with abscess present.     Plan:  - Antibiotics: Per Primary team/ID  - Patient surgical incision is progressing well.   - Dressings:  betadine soaked adaptic. Betadine gauze 4x4's Kerlix, ACE wrap, Xeroform and gauze to anterior ankle   - Nursing staff is able to change/reinforce dressing if & as necessary until next dressing change. Thank you.  - Incision is healing well with no gaping or necrosis noted,  - Patient is optimized for discharge from podiatry's standpoint.  - Podiatry will peripherally follow while in house.    Follow-up with Dr. Andria Uribe at [address] [place] [address] next Monday 6/2/25.   DVT ppx: Per Primary team  Weightbearing: NWB LLE WBAT RLE      Patient evaluated/discussed with attending Dr. Andria Uribe DPM    Case to be discussed with attending, A&P above reflects a tentative plan. Please await for the final signature from the attending physician on service.    There was a transition of care. Please EPIC Secure alma Yip DPM PGY-3 or Maryam Nolasco DPM PGY-1 with questions about patient care starting Monday 6/2/25    LENO BuiM PGY-1  Podiatric Medicine & Surgery  [id number]   Epic Haiku chat preferred          [1]   Past Medical History:  Diagnosis Date    CKD (chronic kidney disease)     Hyperlipidemia     Hypertension     Hypertensive heart and kidney disease with HF and with CKD stage IV 01/06/2025    Personal history of other diseases of the circulatory system     History of hypertension    Personal history of other endocrine, nutritional and metabolic disease     History of  hyperlipidemia    Personal history of other endocrine, nutritional and metabolic disease     History of diabetes mellitus   [2]   Past Surgical History:  Procedure Laterality Date    CARDIAC CATHETERIZATION N/A 2025    Procedure: Right Heart Cath;  Surgeon: Migel Stanton MD;  Location: [place] [address] Cardiac Cath Lab;  Service: Cardiovascular;  Laterality: N/A;    CARDIAC CATHETERIZATION N/A 2025    Procedure: Right Heart Cath;  Surgeon: Ana Lea MD;  Location: [place] [address] Cardiac Cath Lab;  Service: Cardiovascular;  Laterality: N/A;    CT ANGIO NECK  2023    CT NECK ANGIO W AND WO IV CONTRAST 2023 DOCTOR OFFICE LEGACY    CT HEAD ANGIO W AND WO IV CONTRAST  2023    CT HEAD ANGIO W AND WO IV CONTRAST 2023 DOCTOR OFFICE LEGACY    OTHER SURGICAL HISTORY  10/21/2020    Toe amputation   [3]   Family History  Problem Relation Name Age of Onset    Alzheimer's disease Mother      Diabetes Mother      Lung cancer Mother      Diabetes Father      Lung cancer Father      Pancreatic cancer Father      Diabetes Sister      Lung cancer Sister     [4]   Social History  Tobacco Use    Smoking status: Never     Passive exposure: Never    Smokeless tobacco: Never   Vaping Use    Vaping status: Never Used   Substance Use Topics    Alcohol use: Yes    Drug use: Never   [5]   Medications Prior to Admission   Medication Sig Dispense Refill Last Dose/Taking    carvedilol (Coreg) 12.5 mg tablet Take 1 tablet (12.5 mg) by mouth 2 times a day. 60 tablet 1 Taking    [] amoxicillin-clavulanate (Augmentin) 875-125 mg tablet Take 1 tablet (875 mg) by mouth 2 times a day for 10 days. 20 tablet 0     atorvastatin (Lipitor) 40 mg tablet Take 1 tablet (40 mg) by mouth once daily. 30 tablet 1     B complex-vitamin C-folic acid (Nephro-Duke) 0.8 mg tablet Take 1 tablet by mouth once daily. 90 tablet 0     blood-glucose sensor (FreeStyle Remington 3 Sensor) device Use to monitor blood glucose. Change sensor  "every 14 days. 2 each 11     [] ferrous sulfate tablet Take 1 tablet (325 mg) by mouth once daily with breakfast. 90 tablet 0     FreeStyle Remington 3 [place] misc Use as instructed to monitor blood glucose. 1 each 0     gabapentin (Neurontin) 300 mg capsule Take 1 capsule (300 mg) by mouth 2 times a day. 180 capsule 3     hydrALAZINE (Apresoline) 50 mg tablet Take 1 tablet (50 mg) by mouth 3 times a day. 270 tablet 3     insulin glargine (Basaglar KwikPen U-100 Insulin) 100 unit/mL (3 mL) pen Inject 15 Units under the skin once daily in the morning. Take as directed per insulin instructions.       insulin lispro (Admelog SoloStar U-100 Insulin) 100 unit/mL injection Inject per sliding scale instructions under the skin 3 times a day with meals. (Max daily dose 70 units) 15 mL 1     isosorbide mononitrate ER (Imdur) 30 mg 24 hr tablet Take 1 tablet (30 mg) by mouth once daily. Do not crush or chew. 30 tablet 3     OneTouch Delica Plus Lancet 30 gauge misc USE TO TEST BLOOD SUGAR AS DIRECTED 200 each 0     OneTouch Ultra Test strip Use 1 strip BID to check glucose 200 strip 1     OneTouch Ultra2 Meter misc use 1 to 2 times daily 1 each 0     oxyCODONE (Roxicodone) 5 mg immediate release tablet Take 1 tablet (5 mg) by mouth every 4 hours if needed for severe pain (7 - 10) or moderate pain (4 - 6). 42 tablet 0     pen needle, diabetic 32 gauge x 5/32\" needle Use four times a day with insulin use 360 each 1     [] sodium hypochlorite (Dakin's HALF-Strength) 0.25 % external solution Apply topically 1 time for 1 dose. Use for daily dressing changes for left foot 473 mL 2     tirzepatide (Mounjaro) 5 mg/0.5 mL pen injector Inject 5 mg under the skin 1 (one) time per week. 2 mL 1     torsemide (Demadex) 20 mg tablet Take 2 tablets (40 mg) by mouth once daily. 120 tablet 0    [6] No Known Allergies  [7] amoxicillin-clavulanate, 1 tablet, oral, q24h  atorvastatin, 40 mg, oral, Nightly  carvedilol, 12.5 mg, oral, " BID  [Held by provider] gabapentin, 300 mg, oral, BID  heparin (porcine), 7,500 Units, subcutaneous, q8h  hydrALAZINE, 50 mg, oral, TID  insulin lispro, 0-15 Units, subcutaneous, TID AC  isosorbide mononitrate ER, 30 mg, oral, Daily  lidocaine, 5 mL, infiltration, Once  nystatin, 1 Application, Topical, BID  polyethylene glycol, 17 g, oral, Daily  [Held by provider] tamsulosin, 0.4 mg, oral, Daily  torsemide, 40 mg, oral, Daily  vitamin B complex-vitamin C-folic acid, 1 capsule, oral, Daily    [8]    [9] PRN medications: acetaminophen, dextrose, dextrose, glucagon, glucagon

## 2025-05-31 NOTE — PROGRESS NOTES
Nuris Squires is a 59 y.o. female on day 7 of admission presenting with Left foot infection.        Objective     Last Recorded Vitals  /64   Pulse 71   Temp 36.7 °C (98.1 °F)   Resp 18   Wt 102 kg (225 lb)   SpO2 93%   Intake/Output last 3 Shifts:    Intake/Output Summary (Last 24 hours) at 5/31/2025 1949  Last data filed at 5/31/2025 0600  Gross per 24 hour   Intake --   Output 900 ml   Net -900 ml       Admission Weight  Weight: 102 kg (225 lb) (05/24/25 1307)    Daily Weight  05/24/25 : 102 kg (225 lb)    Image Results  US renal complete  Narrative: Interpreted By:  Francis Mcgee,  and Jett Verdugo   STUDY:  US RENAL COMPLETE;  5/31/2025 4:00 pm      INDICATION:  Signs/Symptoms:VENKAT on CKD  Renal team requested to repeat US.          COMPARISON:  Renal ultrasound 05/30/2025      ACCESSION NUMBER(S):  [id number]      ORDERING CLINICIAN:  ALEX SCHROEDER      TECHNIQUE:  Multiple images of the kidneys were obtained  .      FINDINGS:  RIGHT KIDNEY:  The right kidney measures 10.9 cm in length. Increased renal cortical  echogenicity. Renal cortical thickness is within normal limits. No  hydronephrosis is present; no evidence of nephrolithiasis.      LEFT KIDNEY:  The left kidney measures 10.1 cm in length. A circumscribed anechoic  lesion is seen in the renal midpole measuring 0.9 x 0.6 x 0.7 cm is  compatible with a simple cyst. The renal cortical echogenicity is  increased. Renal cortical thickness are within normal limits. No  hydronephrosis is present; no evidence of nephrolithiasis.      BLADDER:  A Louis catheter is present within a decompressed urinary bladder.      Impression: Bilateral increased renal cortical echogenicity can be seen the  setting of medical renal disease.      I personally reviewed the images/study and I agree with the findings  as stated by Kartik Frausto MD (resident) . This study was  interpreted at [facility]  [place], [place].      MACRO:  None      Signed by: Francis Mcgee 5/31/2025 6:56 PM  Dictation workstation:   [id number]      Physical Exam  Constitutional: Pleasant elderly female, alert active, cooperative not in acute distress  Eyes: PERRLA, clear sclera  ENMT: Moist mucosal membranes, no exudate  Head / Neck: Atraumatic, normocephalic, supple neck, JVP not visualized  Lungs: Patent airways, CTABL  Heart: RRR, S1S2, no murmurs appreciated, palpable pulses in all extremities  GI: Soft, NT, ND, bowel sounds present in all quadrants  MSK: Moves all extremities freely, no restriction  of ROM, no joint edema  Extremities: Left foot covered with dressing dry and intact,  Midline insertion in the right arm, no peripheral edema  : No Louis catheter inserted  Breast: Deferred  Neurological: AAO x 3 to person, place and date, facial muscles symmetrical, sensation intact, strength 4/4, no acute focal neurological deficits appreciated  Psychological: Appropriate mood and behavior    Relevant Results                Scheduled medications  Scheduled Medications[1]  Continuous medications  Continuous Medications[2]  PRN medications  PRN Medications[3]    Assessment & Plan  Left foot infection    Chronic ulcer of left foot with fat layer exposed (Multi)    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  Osteomyelitis of the left foot, status post left 4th and 5th digit metatarsal amputation and washout, podiatry following.              Subjective      5/31  Pt is more awake, alert, oriented and cooperative this am, reports she is feeling some what better than  yesterday and no longer having abd pain after she had a BM. Renal team is still following daily to reassess the need for HD. No plan for HD at the moment. Pt is making more urine. Louis is in place.        5/30  -Per ID team: Discontinued vancomycin and discontinue Zosyn. Started Augmentin 500 mg daily (renal dosing) 2-4 weeks.   -Placed Louis giving persistent retention.   -Looks sad and depressed as renal team told she may not qualify for HD, Spiritual Care requested. PET and Music therapy requested.   -Avoid renal toxic meds  -Normotensive VSS HDS          AP      #VENKAT on CKD, stage 5, worsening.  -She was CKD 3B vs IV Jan 2025. BS Cr 1.68 --> 5 now   -Requested UA, urine c/s, urine lytes, C3, C4   -Renal consulted.  -I&O.  -Avoid Renal toxic med.  -Switch the enoxaparin to heparin 7500 subcutaneous q 8 hours  -Daily RFP, Renal team to continue to follow and reassess     #Urinary retention S/p surgery vs Med induced (Oxy).   -TOV with bladder scan failed, stopped Oxy.   -Avoid constipation. Renal US requested    -Louis was placed 5/30, Held tamsulosin      #Osteomyelitis of L foot  #Diabetic gas forming infection of left foot   #Status post podiatry intervention with  L 4th and 5th metatarsal amputation and washout  on 5/25  -Wound cultures sent    -Podiatry consulted: s/p left foot 4th and 5th digit amputation, scheduled for Left foot 5/27:  Delayed Primary Closure, Bone cortex Excision, Reverse Sural Flap 5/27/25.   -Per ID team: Discontinued vancomycin and discontinue Zosyn. Started Augmentin 500 mg daily (renal dosing) 2-4 weeks.   - pt/ot, mobilize pt as tolerated, NWB to LLE.    -Post op shoe to the left foot requested   -She would like to know her options for nursing home instead of going home. PTOT requested.   -Reports  pain is controlled.  -Wound care instruction requested: betadine soaked Adaptic, 4 x 4's ABD pad, kerlix, ace wrap. Pt will follow with Dr. Uribe at [facility] on Monday, appt is already  scheduled         #Acute on chronic anemia. Stable         #HFpEF/HTN  - compensated     #T2DM  #Hyperglycemic, escalated ISS#3.   -SSI while inpatient, carb controlled diet   -hypoglycemia protocol                Diet  - Diabetic Renal   - Dispo SNF eventually  TCC was updated     ADOD in 2 days      Mikala Escoto MD           [1] amoxicillin-clavulanate, 1 tablet, oral, q24h  atorvastatin, 40 mg, oral, Nightly  carvedilol, 12.5 mg, oral, BID  [Held by provider] gabapentin, 300 mg, oral, BID  heparin (porcine), 7,500 Units, subcutaneous, q8h  hydrALAZINE, 50 mg, oral, TID  insulin lispro, 0-15 Units, subcutaneous, TID AC  isosorbide mononitrate ER, 30 mg, oral, Daily  lidocaine, 5 mL, infiltration, Once  nystatin, 1 Application, Topical, BID  polyethylene glycol, 17 g, oral, Daily  torsemide, 40 mg, oral, Daily  vitamin B complex-vitamin C-folic acid, 1 capsule, oral, Daily     [2]    [3] PRN medications: acetaminophen, dextrose, dextrose, glucagon, glucagon

## 2025-05-31 NOTE — CARE PLAN
The patient's goals for the shift include Pt would like to sit up to the side of the bed.    The clinical goals for the shift include Pt will remain safe and free of injury throughout shift.      Problem: Pain - Adult  Goal: Verbalizes/displays adequate comfort level or baseline comfort level  Outcome: Progressing     Problem: Safety - Adult  Goal: Free from fall injury  Outcome: Progressing     Problem: Discharge Planning  Goal: Discharge to home or other facility with appropriate resources  Outcome: Progressing     Problem: Chronic Conditions and Co-morbidities  Goal: Patient's chronic conditions and co-morbidity symptoms are monitored and maintained or improved  Outcome: Progressing     Problem: Nutrition  Goal: Nutrient intake appropriate for maintaining nutritional needs  Outcome: Progressing     Problem: Diabetes  Goal: Achieve decreasing blood glucose levels by end of shift  Outcome: Progressing  Goal: Increase stability of blood glucose readings by end of shift  Outcome: Progressing  Goal: Decrease in ketones present in urine by end of shift  Outcome: Progressing  Goal: Maintain electrolyte levels within acceptable range throughout shift  Outcome: Progressing  Goal: Maintain glucose levels >70mg/dl to <250mg/dl throughout shift  Outcome: Progressing  Goal: No changes in neurological exam by end of shift  Outcome: Progressing  Goal: Learn about and adhere to nutrition recommendations by end of shift  Outcome: Progressing  Goal: Vital signs within normal range for age by end of shift  Outcome: Progressing  Goal: Increase self care and/or family involovement by end of shift  Outcome: Progressing  Goal: Receive DSME education by end of shift  Outcome: Progressing     Problem: Skin  Goal: Decreased wound size/increased tissue granulation at next dressing change  Outcome: Progressing  Goal: Participates in plan/prevention/treatment measures  Outcome: Progressing  Goal: Prevent/manage excess moisture  Outcome:  Progressing  Goal: Prevent/minimize sheer/friction injuries  Outcome: Progressing  Goal: Promote/optimize nutrition  Outcome: Progressing  Goal: Promote skin healing  Outcome: Progressing     Problem: Fall/Injury  Goal: Not fall by end of shift  Outcome: Progressing  Goal: Be free from injury by end of the shift  Outcome: Progressing  Goal: Verbalize understanding of personal risk factors for fall in the hospital  Outcome: Progressing  Goal: Verbalize understanding of risk factor reduction measures to prevent injury from fall in the home  Outcome: Progressing  Goal: Use assistive devices by end of the shift  Outcome: Progressing  Goal: Pace activities to prevent fatigue by end of the shift  Outcome: Progressing

## 2025-05-31 NOTE — PROGRESS NOTES
Nuris Squires is a 59 y.o. female on day 7 of admission presenting with Left foot infection.      Subjective   Stable.  No acute event overnight.  Nonoliguric.  Hemodynamcially stable.    Objective          Vitals 24HR  Heart Rate:  [67-75]   Temp:  [36.3 °C (97.3 °F)-37 °C (98.6 °F)]   Resp:  [17]   BP: (126-130)/(50-58)   SpO2:  [93 %]       Intake/Output last 3 Shifts:    Intake/Output Summary (Last 24 hours) at 5/31/2025 1247  Last data filed at 5/31/2025 0600  Gross per 24 hour   Intake --   Output 1600 ml   Net -1600 ml       Physical Exam  Constitutional:       Appearance: Normal appearance.   Cardiovascular:      Rate and Rhythm: Normal rate and regular rhythm.      Pulses: Normal pulses.      Heart sounds: Normal heart sounds.   Pulmonary:      Effort: Pulmonary effort is normal.      Breath sounds: Normal breath sounds.   Abdominal:      General: Abdomen is flat.      Palpations: Abdomen is soft.   Musculoskeletal:         General: Swelling present.      Left lower leg: Edema present.   Neurological:      Mental Status: She is alert.       Renal US 05/30  RIGHT KIDNEY:  The right kidney measures 10.9 cm in length. The renal cortex is  mildly echogenic. No hydronephrosis is present; no evidence of  nephrolithiasis.      LEFT KIDNEY:  Extremely limited evaluation of the left kidney due to overlying  bowel gas. Nephrolithiasis.      BLADDER:  Small amount of debris layering within the right side of the bladder.  No evidence of urinary bladder wall thickening.      IMPRESSION:  1. The right kidney is mildly echogenic, which can be seen in the  setting of medical renal disease. No hydronephrosis on the right.  2. Extremely limited evaluation of the left kidney due to overlying  bowel gas.  3. Small amount of debris layering within the urinary bladder without  evidence of urinary bladder wall thickening or pericystic fluid.  Findings are nonspecific, but could be seen in the setting of  acute  cystitis.        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 Garcia DPM for osteomyelitis of L foot status post left 4th and 5th digit metatarsal amputation and washout. Nephrology is consulted for SIMON on CKD 4.      #Simon on CKD 4  - Baseline creatinine:2.8-2.7  - Etiology: Likely MAULIK in setting of advanced CKD, Vanco associated ATN and urinary retention  - UA showed:  Proteins 2+, RBCs>20, WBC>50, FeNA is 2 % suggesting intrinsic Kidney injury  - Clinical volume status:Euvolemic  - Electrolytes (Na, K, Ca, Phos) stable  - Acid base status:HAGMA, NAGMA in setting of SIMON and CKD              -UOP documented is 1600 x 24 hr       KFRE 2-Year: 84.6% at 5/30/2025  6:16 AM  Calculated from:  Serum Creatinine: 6.69 mg/dL at 5/30/2025  6:16 AM  Urine Albumin Creatinine Ratio: 1,471 ug/mg Creat at 5/29/2025  6:26 PM  Age: 59 years  Sex: Female at 5/30/2025  6:16 AM  Has CKD-3 to CKD-5: Yes      #Drowsy -off Meagan and is better  Recommendations:  - Indication for dialysis:  No.Will eval daily if needed but may need close follow up with nephrology and dialysis education as this is second admission in 1 year making her high risk for progression.  - Avoid nephrotoxins, contrast if possible  - strict Is/Os, daily BMP  - Renal dosing for medications for latest eGFR, follow medication trough as appropriate  - Avoid hypotension/rapid fluctuations in BPs     Shania Fall MD  Nephrology Fellow  24 hour Renal Pager - [phone]     Discussed with attending nephrologist        Shania Fall MD

## 2025-05-31 NOTE — CARE PLAN
The patient's goals for the shift include Pt would like to sit up to the side of the bed.    The clinical goals for the shift include Patient will remain HDS this shift      Problem: Pain - Adult  Goal: Verbalizes/displays adequate comfort level or baseline comfort level  Outcome: Progressing     Problem: Safety - Adult  Goal: Free from fall injury  Outcome: Progressing     Problem: Discharge Planning  Goal: Discharge to home or other facility with appropriate resources  Outcome: Progressing     Problem: Chronic Conditions and Co-morbidities  Goal: Patient's chronic conditions and co-morbidity symptoms are monitored and maintained or improved  Outcome: Progressing     Problem: Nutrition  Goal: Nutrient intake appropriate for maintaining nutritional needs  Outcome: Progressing     Problem: Diabetes  Goal: Achieve decreasing blood glucose levels by end of shift  Outcome: Progressing  Goal: Increase stability of blood glucose readings by end of shift  Outcome: Progressing  Goal: Decrease in ketones present in urine by end of shift  Outcome: Progressing  Goal: Maintain electrolyte levels within acceptable range throughout shift  Outcome: Progressing  Goal: Maintain glucose levels >70mg/dl to <250mg/dl throughout shift  Outcome: Progressing  Goal: No changes in neurological exam by end of shift  Outcome: Progressing  Goal: Learn about and adhere to nutrition recommendations by end of shift  Outcome: Progressing  Goal: Vital signs within normal range for age by end of shift  Outcome: Progressing  Goal: Increase self care and/or family involovement by end of shift  Outcome: Progressing  Goal: Receive DSME education by end of shift  Outcome: Progressing     Problem: Skin  Goal: Decreased wound size/increased tissue granulation at next dressing change  Outcome: Progressing  Goal: Participates in plan/prevention/treatment measures  Outcome: Progressing  Goal: Prevent/manage excess moisture  Outcome: Progressing  Goal:  Prevent/minimize sheer/friction injuries  Outcome: Progressing  Goal: Promote/optimize nutrition  Outcome: Progressing  Goal: Promote skin healing  Outcome: Progressing     Problem: Fall/Injury  Goal: Not fall by end of shift  Outcome: Progressing  Goal: Be free from injury by end of the shift  Outcome: Progressing  Goal: Verbalize understanding of personal risk factors for fall in the hospital  Outcome: Progressing  Goal: Verbalize understanding of risk factor reduction measures to prevent injury from fall in the home  Outcome: Progressing  Goal: Use assistive devices by end of the shift  Outcome: Progressing  Goal: Pace activities to prevent fatigue by end of the shift  Outcome: Progressing

## 2025-06-01 VITALS
WEIGHT: 225 LBS | RESPIRATION RATE: 16 BRPM | SYSTOLIC BLOOD PRESSURE: 113 MMHG | TEMPERATURE: 98.6 F | DIASTOLIC BLOOD PRESSURE: 59 MMHG | OXYGEN SATURATION: 94 % | BODY MASS INDEX: 41.41 KG/M2 | HEART RATE: 71 BPM | HEIGHT: 62 IN

## 2025-06-01 LAB
ALBUMIN SERPL BCP-MCNC: 2.4 G/DL (ref 3.4–5)
ANION GAP SERPL CALC-SCNC: 16 MMOL/L (ref 10–20)
BACTERIA FLD CULT: ABNORMAL
BACTERIA FLD CULT: ABNORMAL
BACTERIA SPEC CULT: ABNORMAL
BASOPHILS # BLD AUTO: 0.04 X10*3/UL (ref 0–0.1)
BASOPHILS NFR BLD AUTO: 0.5 %
BUN SERPL-MCNC: 56 MG/DL (ref 6–23)
CALCIUM SERPL-MCNC: 7.6 MG/DL (ref 8.6–10.6)
CHLORIDE SERPL-SCNC: 109 MMOL/L (ref 98–107)
CO2 SERPL-SCNC: 19 MMOL/L (ref 21–32)
CREAT SERPL-MCNC: 7 MG/DL (ref 0.5–1.05)
EGFRCR SERPLBLD CKD-EPI 2021: 6 ML/MIN/1.73M*2
EOSINOPHIL # BLD AUTO: 0.2 X10*3/UL (ref 0–0.7)
EOSINOPHIL NFR BLD AUTO: 2.7 %
ERYTHROCYTE [DISTWIDTH] IN BLOOD BY AUTOMATED COUNT: 15 % (ref 11.5–14.5)
GLUCOSE BLD MANUAL STRIP-MCNC: 173 MG/DL (ref 74–99)
GLUCOSE BLD MANUAL STRIP-MCNC: 175 MG/DL (ref 74–99)
GLUCOSE BLD MANUAL STRIP-MCNC: 189 MG/DL (ref 74–99)
GLUCOSE BLD MANUAL STRIP-MCNC: 195 MG/DL (ref 74–99)
GLUCOSE SERPL-MCNC: 157 MG/DL (ref 74–99)
GRAM STN SPEC: ABNORMAL
HCT VFR BLD AUTO: 23.4 % (ref 36–46)
HGB BLD-MCNC: 7.2 G/DL (ref 12–16)
IMM GRANULOCYTES # BLD AUTO: 0.02 X10*3/UL (ref 0–0.7)
IMM GRANULOCYTES NFR BLD AUTO: 0.3 % (ref 0–0.9)
LYMPHOCYTES # BLD AUTO: 2.04 X10*3/UL (ref 1.2–4.8)
LYMPHOCYTES NFR BLD AUTO: 27.4 %
MCH RBC QN AUTO: 30.3 PG (ref 26–34)
MCHC RBC AUTO-ENTMCNC: 30.8 G/DL (ref 32–36)
MCV RBC AUTO: 98 FL (ref 80–100)
MONOCYTES # BLD AUTO: 0.56 X10*3/UL (ref 0.1–1)
MONOCYTES NFR BLD AUTO: 7.5 %
NEUTROPHILS # BLD AUTO: 4.59 X10*3/UL (ref 1.2–7.7)
NEUTROPHILS NFR BLD AUTO: 61.6 %
NRBC BLD-RTO: 0 /100 WBCS (ref 0–0)
PHOSPHATE SERPL-MCNC: 7.1 MG/DL (ref 2.5–4.9)
PLATELET # BLD AUTO: 321 X10*3/UL (ref 150–450)
POTASSIUM SERPL-SCNC: 4.2 MMOL/L (ref 3.5–5.3)
RBC # BLD AUTO: 2.38 X10*6/UL (ref 4–5.2)
SODIUM SERPL-SCNC: 140 MMOL/L (ref 136–145)
WBC # BLD AUTO: 7.5 X10*3/UL (ref 4.4–11.3)

## 2025-06-01 PROCEDURE — 80069 RENAL FUNCTION PANEL: CPT

## 2025-06-01 PROCEDURE — 2500000001 HC RX 250 WO HCPCS SELF ADMINISTERED DRUGS (ALT 637 FOR MEDICARE OP): Performed by: STUDENT IN AN ORGANIZED HEALTH CARE EDUCATION/TRAINING PROGRAM

## 2025-06-01 PROCEDURE — 85025 COMPLETE CBC W/AUTO DIFF WBC: CPT | Performed by: STUDENT IN AN ORGANIZED HEALTH CARE EDUCATION/TRAINING PROGRAM

## 2025-06-01 PROCEDURE — 2500000001 HC RX 250 WO HCPCS SELF ADMINISTERED DRUGS (ALT 637 FOR MEDICARE OP)

## 2025-06-01 PROCEDURE — 99232 SBSQ HOSP IP/OBS MODERATE 35: CPT | Performed by: STUDENT IN AN ORGANIZED HEALTH CARE EDUCATION/TRAINING PROGRAM

## 2025-06-01 PROCEDURE — 2500000002 HC RX 250 W HCPCS SELF ADMINISTERED DRUGS (ALT 637 FOR MEDICARE OP, ALT 636 FOR OP/ED): Performed by: STUDENT IN AN ORGANIZED HEALTH CARE EDUCATION/TRAINING PROGRAM

## 2025-06-01 PROCEDURE — 1100000001 HC PRIVATE ROOM DAILY

## 2025-06-01 PROCEDURE — 82947 ASSAY GLUCOSE BLOOD QUANT: CPT

## 2025-06-01 PROCEDURE — 2500000004 HC RX 250 GENERAL PHARMACY W/ HCPCS (ALT 636 FOR OP/ED): Mod: JZ | Performed by: STUDENT IN AN ORGANIZED HEALTH CARE EDUCATION/TRAINING PROGRAM

## 2025-06-01 PROCEDURE — 36415 COLL VENOUS BLD VENIPUNCTURE: CPT | Performed by: STUDENT IN AN ORGANIZED HEALTH CARE EDUCATION/TRAINING PROGRAM

## 2025-06-01 RX ORDER — LOPERAMIDE HYDROCHLORIDE 2 MG/1
2 CAPSULE ORAL 4 TIMES DAILY PRN
Status: DISCONTINUED | OUTPATIENT
Start: 2025-06-01 | End: 2025-06-05 | Stop reason: HOSPADM

## 2025-06-01 RX ORDER — PANTOPRAZOLE SODIUM 40 MG/10ML
40 INJECTION, POWDER, LYOPHILIZED, FOR SOLUTION INTRAVENOUS DAILY
Status: DISCONTINUED | OUTPATIENT
Start: 2025-06-01 | End: 2025-06-02

## 2025-06-01 RX ORDER — ALUMINUM HYDROXIDE, MAGNESIUM HYDROXIDE, AND SIMETHICONE 1200; 120; 1200 MG/30ML; MG/30ML; MG/30ML
10 SUSPENSION ORAL 4 TIMES DAILY PRN
Status: DISCONTINUED | OUTPATIENT
Start: 2025-06-01 | End: 2025-06-05 | Stop reason: HOSPADM

## 2025-06-01 RX ORDER — TAMSULOSIN HYDROCHLORIDE 0.4 MG/1
0.8 CAPSULE ORAL DAILY
Status: DISCONTINUED | OUTPATIENT
Start: 2025-06-01 | End: 2025-06-05 | Stop reason: HOSPADM

## 2025-06-01 RX ADMIN — HEPARIN SODIUM 7500 UNITS: 5000 INJECTION INTRAVENOUS; SUBCUTANEOUS at 11:39

## 2025-06-01 RX ADMIN — INSULIN LISPRO 3 UNITS: 100 INJECTION, SOLUTION INTRAVENOUS; SUBCUTANEOUS at 11:39

## 2025-06-01 RX ADMIN — CARVEDILOL 12.5 MG: 12.5 TABLET, FILM COATED ORAL at 21:40

## 2025-06-01 RX ADMIN — HEPARIN SODIUM 7500 UNITS: 5000 INJECTION INTRAVENOUS; SUBCUTANEOUS at 02:02

## 2025-06-01 RX ADMIN — HYDRALAZINE HYDROCHLORIDE 50 MG: 50 TABLET ORAL at 08:14

## 2025-06-01 RX ADMIN — CARVEDILOL 12.5 MG: 12.5 TABLET, FILM COATED ORAL at 08:14

## 2025-06-01 RX ADMIN — HYDRALAZINE HYDROCHLORIDE 50 MG: 50 TABLET ORAL at 14:31

## 2025-06-01 RX ADMIN — PANTOPRAZOLE SODIUM 40 MG: 40 INJECTION, POWDER, FOR SOLUTION INTRAVENOUS at 14:31

## 2025-06-01 RX ADMIN — AMOXICILLIN AND CLAVULANATE POTASSIUM 1 TABLET: 500; 125 TABLET, FILM COATED ORAL at 11:39

## 2025-06-01 RX ADMIN — HEPARIN SODIUM 7500 UNITS: 5000 INJECTION INTRAVENOUS; SUBCUTANEOUS at 21:40

## 2025-06-01 RX ADMIN — HYDRALAZINE HYDROCHLORIDE 50 MG: 50 TABLET ORAL at 21:40

## 2025-06-01 RX ADMIN — LOPERAMIDE HYDROCHLORIDE 2 MG: 2 CAPSULE ORAL at 14:31

## 2025-06-01 RX ADMIN — INSULIN LISPRO 3 UNITS: 100 INJECTION, SOLUTION INTRAVENOUS; SUBCUTANEOUS at 08:15

## 2025-06-01 RX ADMIN — ISOSORBIDE MONONITRATE 30 MG: 30 TABLET, EXTENDED RELEASE ORAL at 08:14

## 2025-06-01 RX ADMIN — TAMSULOSIN HYDROCHLORIDE 0.8 MG: 0.4 CAPSULE ORAL at 16:04

## 2025-06-01 RX ADMIN — TORSEMIDE 40 MG: 20 TABLET ORAL at 08:15

## 2025-06-01 RX ADMIN — INSULIN LISPRO 3 UNITS: 100 INJECTION, SOLUTION INTRAVENOUS; SUBCUTANEOUS at 16:04

## 2025-06-01 RX ADMIN — ATORVASTATIN CALCIUM 40 MG: 40 TABLET, FILM COATED ORAL at 21:40

## 2025-06-01 RX ADMIN — ASCORBIC ACID, THIAMINE MONONITRATE,RIBOFLAVIN, NIACINAMIDE, PYRIDOXINE HYDROCHLORIDE, FOLIC ACID, CYANOCOBALAMIN, BIOTIN, CALCIUM PANTOTHENATE, 1 CAPSULE: 100; 1.5; 1.7; 20; 10; 1; 6000; 150000; 5 CAPSULE, LIQUID FILLED ORAL at 08:14

## 2025-06-01 ASSESSMENT — COGNITIVE AND FUNCTIONAL STATUS - GENERAL
HELP NEEDED FOR BATHING: A LITTLE
WALKING IN HOSPITAL ROOM: A LITTLE
DRESSING REGULAR LOWER BODY CLOTHING: A LITTLE
DAILY ACTIVITIY SCORE: 20
MOVING TO AND FROM BED TO CHAIR: A LITTLE
TOILETING: A LITTLE
MOBILITY SCORE: 19
DRESSING REGULAR UPPER BODY CLOTHING: A LITTLE
STANDING UP FROM CHAIR USING ARMS: A LITTLE
TURNING FROM BACK TO SIDE WHILE IN FLAT BAD: A LITTLE
CLIMB 3 TO 5 STEPS WITH RAILING: A LITTLE

## 2025-06-01 ASSESSMENT — PAIN SCALES - GENERAL
PAINLEVEL_OUTOF10: 0 - NO PAIN

## 2025-06-01 ASSESSMENT — PAIN - FUNCTIONAL ASSESSMENT: PAIN_FUNCTIONAL_ASSESSMENT: 0-10

## 2025-06-01 NOTE — PROGRESS NOTES
Nuris Squires is a 59 y.o. female on day 8 of admission presenting with Left foot infection.        Objective     Last Recorded Vitals  /58   Pulse 67   Temp 36.8 °C (98.2 °F)   Resp 16   Wt 102 kg (225 lb)   SpO2 95%   Intake/Output last 3 Shifts:    Intake/Output Summary (Last 24 hours) at 6/1/2025 1510  Last data filed at 6/1/2025 1017  Gross per 24 hour   Intake --   Output 2050 ml   Net -2050 ml       Admission Weight  Weight: 102 kg (225 lb) (05/24/25 1307)    Daily Weight  05/24/25 : 102 kg (225 lb)    Image Results  US renal complete  Narrative: Interpreted By:  Francis Mcgee,  and Jett Verdugo   STUDY:  US RENAL COMPLETE;  5/31/2025 4:00 pm      INDICATION:  Signs/Symptoms:VENKAT on CKD  Renal team requested to repeat US.          COMPARISON:  Renal ultrasound 05/30/2025      ACCESSION NUMBER(S):  [id number]      ORDERING CLINICIAN:  ALEX SCHROEDER      TECHNIQUE:  Multiple images of the kidneys were obtained  .      FINDINGS:  RIGHT KIDNEY:  The right kidney measures 10.9 cm in length. Increased renal cortical  echogenicity. Renal cortical thickness is within normal limits. No  hydronephrosis is present; no evidence of nephrolithiasis.      LEFT KIDNEY:  The left kidney measures 10.1 cm in length. A circumscribed anechoic  lesion is seen in the renal midpole measuring 0.9 x 0.6 x 0.7 cm is  compatible with a simple cyst. The renal cortical echogenicity is  increased. Renal cortical thickness are within normal limits. No  hydronephrosis is present; no evidence of nephrolithiasis.      BLADDER:  A Louis catheter is present within a decompressed urinary bladder.      Impression: Bilateral increased renal cortical echogenicity can be seen the  setting of medical renal disease.      I personally reviewed the images/study and I agree with the findings  as stated by Kartik Frausto MD (resident) . This study was  interpreted at [facility]  [place], [place].      MACRO:  None      Signed by: Francis Mcgee 5/31/2025 6:56 PM  Dictation workstation:   [id number]      Physical Exam  Constitutional: Pleasant elderly female, alert active, cooperative not in acute distress  Eyes: PERRLA, clear sclera  ENMT: Moist mucosal membranes, no exudate  Head / Neck: Atraumatic, normocephalic, supple neck, JVP not visualized  Lungs: Patent airways, CTABL  Heart: RRR, S1S2, no murmurs appreciated, palpable pulses in all extremities  GI: Soft, NT, ND, bowel sounds present in all quadrants  MSK: Moves all extremities freely, no restriction  of ROM, no joint edema  Extremities: Left foot covered with dressing dry and intact,  Midline insertion in the right arm, no peripheral edema  Neurological: AAO x 3 to person, place and date, facial muscles symmetrical, sensation intact, strength 4/4, no acute focal neurological deficits appreciated  Psychological: Appropriate mood and behavior    Relevant Results                Scheduled medications  Scheduled Medications[1]  Continuous medications  Continuous Medications[2]  PRN medications  PRN Medications[3]    Assessment & Plan  Left foot infection    Chronic ulcer of left foot with fat layer exposed (Multi)    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  Osteomyelitis of the left foot, status post left 4th and 5th digit metatarsal amputation and washout, podiatry following.              Subjective      No events  Reports two episodes of  loose stool associated with stomach cramping, denies fever, n/v. Added Imodium for diarrhea, Maalox and Protonix for GERD.  Will reassess accordingly         AP      #VENKAT on CKD, stage 5, worsening.  -She was CKD 3B vs IV Jan 2025. BS Cr 1.68 --> 5 this admission    -Requested UA, urine c/s, urine lytes, C3, C4   -Renal consulted.  -I&O.  -Avoid Renal toxic med.  -Switch the enoxaparin to heparin 7500 subcutaneous q 8 hours  -Daily RFP, Renal team to continue to follow and reassess indication for HD      #Urinary retention S/p surgery vs Med induced (Oxy).   -Avoid constipation. Renal US reviewed     -Louis removed 6/1 PM, requested bladder scan q6 hours, Resumed tamsulosin  0.8 mg daily     #Osteomyelitis of L foot  #Diabetic gas forming infection of left foot   #Status post podiatry intervention with  L 4th and 5th metatarsal amputation and washout  on 5/25  -Wound cultures requested     -Podiatry consulted: s/p left foot 4th and 5th digit amputation, scheduled for Left foot 5/27:  Delayed Primary Closure, Bone cortex Excision, Reverse Sural Flap 5/27/25.   -Per ID team: Discontinued vancomycin and discontinue Zosyn. Started Augmentin 500 mg daily (renal dosing) 2-4 weeks.   -PTOT, mobilize pt as tolerated, NWB to LLE.   -Post op shoe to the left foot requested already.   -Reports  pain is controlled.  -Wound care instruction requested: betadine soaked Adaptic, 4 x 4's ABD pad, kerlix, ace wrap. Pt will follow with Dr. Uribe at [facility] [place] on Monday, appt is already scheduled    -Dispo  SNF eventually      #Acute on chronic anemia. Stable    #HFpEF/HTN  #T2DM          Diet  - Diabetic Renal  DM  - Dispo SNF eventually  TCC was updated     ADOD in 1 - 2 days      Mikala Escoto MD           [1] amoxicillin-clavulanate, 1 tablet, oral, q24h  atorvastatin, 40 mg, oral, Nightly  carvedilol, 12.5 mg, oral, BID  [Held by provider] gabapentin, 300 mg, oral, BID  heparin (porcine), 7,500 Units, subcutaneous, q8h  hydrALAZINE, 50 mg, oral, TID  insulin lispro, 0-15 Units, subcutaneous, TID AC  isosorbide mononitrate ER, 30 mg, oral,  Daily  lidocaine, 5 mL, infiltration, Once  nystatin, 1 Application, Topical, BID  pantoprazole, 40 mg, intravenous, Daily  polyethylene glycol, 17 g, oral, Daily  torsemide, 40 mg, oral, Daily  vitamin B complex-vitamin C-folic acid, 1 capsule, oral, Daily     [2]    [3] PRN medications: acetaminophen, alum-mag hydroxide-simeth, dextrose, dextrose, glucagon, glucagon, loperamide

## 2025-06-01 NOTE — CARE PLAN
Problem: Pain - Adult  Goal: Verbalizes/displays adequate comfort level or baseline comfort level  Outcome: Progressing     Problem: Safety - Adult  Goal: Free from fall injury  Outcome: Progressing     Problem: Discharge Planning  Goal: Discharge to home or other facility with appropriate resources  Outcome: Progressing     Problem: Chronic Conditions and Co-morbidities  Goal: Patient's chronic conditions and co-morbidity symptoms are monitored and maintained or improved  Outcome: Progressing     Problem: Nutrition  Goal: Nutrient intake appropriate for maintaining nutritional needs  Outcome: Progressing     Problem: Diabetes  Goal: Achieve decreasing blood glucose levels by end of shift  Outcome: Progressing  Goal: Increase stability of blood glucose readings by end of shift  Outcome: Progressing  Goal: Decrease in ketones present in urine by end of shift  Outcome: Progressing  Goal: Maintain electrolyte levels within acceptable range throughout shift  Outcome: Progressing  Goal: Maintain glucose levels >70mg/dl to <250mg/dl throughout shift  Outcome: Progressing  Goal: No changes in neurological exam by end of shift  Outcome: Progressing  Goal: Learn about and adhere to nutrition recommendations by end of shift  Outcome: Progressing  Goal: Vital signs within normal range for age by end of shift  Outcome: Progressing  Goal: Increase self care and/or family involovement by end of shift  Outcome: Progressing  Goal: Receive DSME education by end of shift  Outcome: Progressing     Problem: Skin  Goal: Decreased wound size/increased tissue granulation at next dressing change  Outcome: Progressing  Goal: Participates in plan/prevention/treatment measures  Outcome: Progressing  Goal: Prevent/manage excess moisture  Outcome: Progressing  Goal: Prevent/minimize sheer/friction injuries  Outcome: Progressing  Goal: Promote/optimize nutrition  Outcome: Progressing  Goal: Promote skin healing  Outcome: Progressing      Problem: Fall/Injury  Goal: Not fall by end of shift  Outcome: Progressing  Goal: Be free from injury by end of the shift  Outcome: Progressing  Goal: Verbalize understanding of personal risk factors for fall in the hospital  Outcome: Progressing  Goal: Verbalize understanding of risk factor reduction measures to prevent injury from fall in the home  Outcome: Progressing  Goal: Use assistive devices by end of the shift  Outcome: Progressing  Goal: Pace activities to prevent fatigue by end of the shift  Outcome: Progressing   The patient's goals for the shift include Pt would like to sit up to the side of the bed.    The clinical goals for the shift include Patient will remain fall free this shift

## 2025-06-02 ENCOUNTER — HOME CARE VISIT (OUTPATIENT)
Dept: HOME HEALTH SERVICES | Facility: HOME HEALTH | Age: 60
End: 2025-06-02
Payer: COMMERCIAL

## 2025-06-02 LAB
ALBUMIN SERPL BCP-MCNC: 2.8 G/DL (ref 3.4–5)
ANION GAP SERPL CALC-SCNC: 14 MMOL/L (ref 10–20)
BASOPHILS # BLD AUTO: 0.04 X10*3/UL (ref 0–0.1)
BASOPHILS NFR BLD AUTO: 0.5 %
BUN SERPL-MCNC: 62 MG/DL (ref 6–23)
CALCIUM SERPL-MCNC: 8.5 MG/DL (ref 8.6–10.6)
CHLORIDE SERPL-SCNC: 107 MMOL/L (ref 98–107)
CO2 SERPL-SCNC: 20 MMOL/L (ref 21–32)
CREAT SERPL-MCNC: 6.23 MG/DL (ref 0.5–1.05)
EGFRCR SERPLBLD CKD-EPI 2021: 7 ML/MIN/1.73M*2
EOSINOPHIL # BLD AUTO: 0.24 X10*3/UL (ref 0–0.7)
EOSINOPHIL NFR BLD AUTO: 2.9 %
ERYTHROCYTE [DISTWIDTH] IN BLOOD BY AUTOMATED COUNT: 14.7 % (ref 11.5–14.5)
GLUCOSE BLD MANUAL STRIP-MCNC: 216 MG/DL (ref 74–99)
GLUCOSE BLD MANUAL STRIP-MCNC: 238 MG/DL (ref 74–99)
GLUCOSE BLD MANUAL STRIP-MCNC: 239 MG/DL (ref 74–99)
GLUCOSE BLD MANUAL STRIP-MCNC: 250 MG/DL (ref 74–99)
GLUCOSE SERPL-MCNC: 249 MG/DL (ref 74–99)
HCT VFR BLD AUTO: 29 % (ref 36–46)
HGB BLD-MCNC: 8.8 G/DL (ref 12–16)
IMM GRANULOCYTES # BLD AUTO: 0.05 X10*3/UL (ref 0–0.7)
IMM GRANULOCYTES NFR BLD AUTO: 0.6 % (ref 0–0.9)
LYMPHOCYTES # BLD AUTO: 1.99 X10*3/UL (ref 1.2–4.8)
LYMPHOCYTES NFR BLD AUTO: 24.2 %
MCH RBC QN AUTO: 30.6 PG (ref 26–34)
MCHC RBC AUTO-ENTMCNC: 30.3 G/DL (ref 32–36)
MCV RBC AUTO: 101 FL (ref 80–100)
MONOCYTES # BLD AUTO: 0.58 X10*3/UL (ref 0.1–1)
MONOCYTES NFR BLD AUTO: 7 %
NEUTROPHILS # BLD AUTO: 5.34 X10*3/UL (ref 1.2–7.7)
NEUTROPHILS NFR BLD AUTO: 64.8 %
NRBC BLD-RTO: 0 /100 WBCS (ref 0–0)
PHOSPHATE SERPL-MCNC: 6.7 MG/DL (ref 2.5–4.9)
PLATELET # BLD AUTO: 398 X10*3/UL (ref 150–450)
POTASSIUM SERPL-SCNC: 4.2 MMOL/L (ref 3.5–5.3)
RBC # BLD AUTO: 2.88 X10*6/UL (ref 4–5.2)
SODIUM SERPL-SCNC: 137 MMOL/L (ref 136–145)
WBC # BLD AUTO: 8.2 X10*3/UL (ref 4.4–11.3)

## 2025-06-02 PROCEDURE — 99232 SBSQ HOSP IP/OBS MODERATE 35: CPT

## 2025-06-02 PROCEDURE — 1100000001 HC PRIVATE ROOM DAILY

## 2025-06-02 PROCEDURE — 2500000004 HC RX 250 GENERAL PHARMACY W/ HCPCS (ALT 636 FOR OP/ED)

## 2025-06-02 PROCEDURE — 2500000001 HC RX 250 WO HCPCS SELF ADMINISTERED DRUGS (ALT 637 FOR MEDICARE OP): Performed by: STUDENT IN AN ORGANIZED HEALTH CARE EDUCATION/TRAINING PROGRAM

## 2025-06-02 PROCEDURE — 2500000001 HC RX 250 WO HCPCS SELF ADMINISTERED DRUGS (ALT 637 FOR MEDICARE OP)

## 2025-06-02 PROCEDURE — 36415 COLL VENOUS BLD VENIPUNCTURE: CPT

## 2025-06-02 PROCEDURE — 80069 RENAL FUNCTION PANEL: CPT

## 2025-06-02 PROCEDURE — 85025 COMPLETE CBC W/AUTO DIFF WBC: CPT | Performed by: STUDENT IN AN ORGANIZED HEALTH CARE EDUCATION/TRAINING PROGRAM

## 2025-06-02 PROCEDURE — 2500000002 HC RX 250 W HCPCS SELF ADMINISTERED DRUGS (ALT 637 FOR MEDICARE OP, ALT 636 FOR OP/ED): Performed by: STUDENT IN AN ORGANIZED HEALTH CARE EDUCATION/TRAINING PROGRAM

## 2025-06-02 PROCEDURE — 2500000004 HC RX 250 GENERAL PHARMACY W/ HCPCS (ALT 636 FOR OP/ED): Performed by: STUDENT IN AN ORGANIZED HEALTH CARE EDUCATION/TRAINING PROGRAM

## 2025-06-02 PROCEDURE — 99232 SBSQ HOSP IP/OBS MODERATE 35: CPT | Performed by: STUDENT IN AN ORGANIZED HEALTH CARE EDUCATION/TRAINING PROGRAM

## 2025-06-02 PROCEDURE — 82947 ASSAY GLUCOSE BLOOD QUANT: CPT

## 2025-06-02 RX ORDER — PANTOPRAZOLE SODIUM 40 MG/1
40 TABLET, DELAYED RELEASE ORAL
Status: DISCONTINUED | OUTPATIENT
Start: 2025-06-03 | End: 2025-06-05 | Stop reason: HOSPADM

## 2025-06-02 RX ADMIN — HEPARIN SODIUM 7500 UNITS: 5000 INJECTION INTRAVENOUS; SUBCUTANEOUS at 06:19

## 2025-06-02 RX ADMIN — INSULIN LISPRO 6 UNITS: 100 INJECTION, SOLUTION INTRAVENOUS; SUBCUTANEOUS at 08:48

## 2025-06-02 RX ADMIN — CARVEDILOL 12.5 MG: 12.5 TABLET, FILM COATED ORAL at 08:45

## 2025-06-02 RX ADMIN — HYDRALAZINE HYDROCHLORIDE 50 MG: 50 TABLET ORAL at 16:32

## 2025-06-02 RX ADMIN — ASCORBIC ACID, THIAMINE MONONITRATE,RIBOFLAVIN, NIACINAMIDE, PYRIDOXINE HYDROCHLORIDE, FOLIC ACID, CYANOCOBALAMIN, BIOTIN, CALCIUM PANTOTHENATE, 1 CAPSULE: 100; 1.5; 1.7; 20; 10; 1; 6000; 150000; 5 CAPSULE, LIQUID FILLED ORAL at 08:45

## 2025-06-02 RX ADMIN — ATORVASTATIN CALCIUM 40 MG: 40 TABLET, FILM COATED ORAL at 19:48

## 2025-06-02 RX ADMIN — AMOXICILLIN AND CLAVULANATE POTASSIUM 1 TABLET: 500; 125 TABLET, FILM COATED ORAL at 11:31

## 2025-06-02 RX ADMIN — HEPARIN SODIUM 7500 UNITS: 5000 INJECTION INTRAVENOUS; SUBCUTANEOUS at 19:48

## 2025-06-02 RX ADMIN — INSULIN LISPRO 6 UNITS: 100 INJECTION, SOLUTION INTRAVENOUS; SUBCUTANEOUS at 11:31

## 2025-06-02 RX ADMIN — PANTOPRAZOLE SODIUM 40 MG: 40 INJECTION, POWDER, FOR SOLUTION INTRAVENOUS at 08:45

## 2025-06-02 RX ADMIN — HYDRALAZINE HYDROCHLORIDE 50 MG: 50 TABLET ORAL at 08:45

## 2025-06-02 RX ADMIN — HYDRALAZINE HYDROCHLORIDE 50 MG: 50 TABLET ORAL at 19:48

## 2025-06-02 RX ADMIN — CARVEDILOL 12.5 MG: 12.5 TABLET, FILM COATED ORAL at 19:48

## 2025-06-02 RX ADMIN — NYSTATIN 1 APPLICATION: 100000 POWDER TOPICAL at 19:49

## 2025-06-02 RX ADMIN — ISOSORBIDE MONONITRATE 30 MG: 30 TABLET, EXTENDED RELEASE ORAL at 08:45

## 2025-06-02 RX ADMIN — TAMSULOSIN HYDROCHLORIDE 0.8 MG: 0.4 CAPSULE ORAL at 08:45

## 2025-06-02 RX ADMIN — POLYETHYLENE GLYCOL 3350 17 G: 17 POWDER, FOR SOLUTION ORAL at 08:45

## 2025-06-02 RX ADMIN — INSULIN LISPRO 6 UNITS: 100 INJECTION, SOLUTION INTRAVENOUS; SUBCUTANEOUS at 16:32

## 2025-06-02 RX ADMIN — TORSEMIDE 40 MG: 20 TABLET ORAL at 08:45

## 2025-06-02 RX ADMIN — HEPARIN SODIUM 7500 UNITS: 5000 INJECTION INTRAVENOUS; SUBCUTANEOUS at 11:36

## 2025-06-02 ASSESSMENT — PAIN SCALES - GENERAL
PAINLEVEL_OUTOF10: 0 - NO PAIN
PAINLEVEL_OUTOF10: 0 - NO PAIN

## 2025-06-02 NOTE — PROGRESS NOTES
Podiatry Medicine & Surgery- Progress Note    Subjective   INTERVAL HPI: Nuris Squires  is a 59 y.o. female on day 9 of admission with principle problem of Left foot infection. Pt was seen at bedside. Pain well controlled. Patient denies any constitutional symptoms. No other pedal complaints.     Medical History[1]  Surgical History[2]  Family History[3]  Social History[4]   Prescriptions Prior to Admission[5]   Allergies[6]     Medications:  Scheduled Medications[7]  Continuous Medications[8]  PRN Medications[9]    Allergies as of 05/24/2025    (No Known Allergies)            Objective   PHYSICAL EXAM:  Vitals:    06/02/25 0614   BP: 155/63   Pulse: 74   Resp: 16   Temp: 36.3 °C (97.3 °F)   SpO2: 94%     Body mass index is 41.15 kg/m².    Patient is AOx3 and in no acute distress. Patient is alert and cooperative. Sitting comfortably in bed with dressing clean, dry and intact.      Vascular: Palpable DP/PT pulses B/L. Moderate pitting edema noted B/L. Hair growth absent B/L. CFT<5 to B/L hallux. Temperature is warm to cool from tibial tuberosity to distal digits B/L. No lymphatic streaking noted B/L.     Musculoskeletal: Gross active and passive ROM intact to age and activity level. Moves all extremities spontaneously. No pain to palpation at feet B/L.      Neurological: Intact light touch sensation B/L. Pain stimuli diminished B/L. Denies any numbness, burning or tingling.     Dermatologic: 1-5 R are within normal limits for thickness and length B/L. Skin appears diffusely xerotic B/L.      Wound: Left TMA Site   Incision site is well coapted with retention sutures and surgical staples.   Mild serosanguinous drainage.   Mild selene-wound maceration at central area of incision and lateral area..   no selene-wound erythema.   No evidence of ascending cellulitis or lymphangitis.  No  palpable fluctuance. No malodor. No increased warmth.   No probe to bone or deep structures within the wound base.   No undermining. Skin  edges irregular but intact.  Overall wound has improved and incision is healing well.     LABS:   Lab Results   Component Value Date    HGBA1C 7.3 (A) 05/20/2025    CRP 2.21 (H) 05/24/2025    SEDRATE 42 (H) 05/24/2025        Results from last 7 days   Lab Units 06/02/25  1115 06/01/25  0717 05/31/25  0602   WBC AUTO x10*3/uL 8.2 7.5 8.0   RBC AUTO x10*6/uL 2.88* 2.38* 2.54*   HEMOGLOBIN g/dL 8.8* 7.2* 7.6*   HEMATOCRIT % 29.0* 23.4* 25.3*     Results from last 7 days   Lab Units 06/02/25  1115 06/01/25  0717 05/31/25  0602   GLUCOSE mg/dL 249* 157* 180*   SODIUM mmol/L 137 140 137   POTASSIUM mmol/L 4.2 4.2 4.5   CHLORIDE mmol/L 107 109* 106   CO2 mmol/L 20* 19* 20*   BUN mg/dL 62* 56* 53*   CREATININE mg/dL 6.23* 7.00* 7.05*   CALCIUM mg/dL 8.5* 7.6* 7.7*   PHOSPHORUS mg/dL 6.7* 7.1* 6.9*       Cultures  Susceptibility data from last 90 days.  Collected Specimen Info Organism Amoxicillin/Clavulanate Ampicillin Ampicillin/Sulbactam Cefazolin Ceftriaxone Ciprofloxacin Clindamycin Erythromycin Gentamicin Levofloxacin Oxacillin Piperacillin/Tazobactam Tetracycline   05/30/25 Fluid from Intra-abdominal Abscess Enterococcus faecium                  Sadaf albicans                05/25/25 Swab from SOFT TISSUE RESECTION Methicillin Susceptible Staphylococcus aureus (MSSA)        S  S    S   S     Proteus mirabilis  S  R  S  I  S  R    S  R   S  R     Mixed Skin Microorganisms                   Collected Specimen Info Organism Trimethoprim/Sulfamethoxazole Vancomycin   05/30/25 Fluid from Intra-abdominal Abscess Enterococcus faecium       Sadaf albicans     05/25/25 Swab from SOFT TISSUE RESECTION Methicillin Susceptible Staphylococcus aureus (MSSA)  S  S     Proteus mirabilis  R      Mixed Skin Microorganisms          IMAGING REVIEW:  XR foot left 3+ views 05/23/2025  Impression  New bony erosive changes at the 4th proximal phalanx and metatarsal  head highly concerning for osteomyelitis.     Marked soft tissue  swelling with possible soft tissue gas which could  be gas producing soft tissue infection.    MR foot left w and wo IV contrast 05/24/2025  FINDINGS:  Postsurgical changes from prior 1st digit interphalangeal and 2nd  digit MTP joint amputations are seen.    Soft tissues: There is a plantar soft tissue ulcer at the level of  the  4th metatarsophalangeal joint with associated subjacent soft  tissue edema and enhancement, suggestive of cellulitis. Questionable  fluid collection versus confluent edema is seen in the plantar soft  tissues at the level of the 3rd mid metatarsal measuring 1.0 x 0.4 x  1.0 cm.    Bones:  There is STIR hyperintensity and loss of normal T1 marrow  signal involving approximate of the distal half of the 4th metatarsal  and the majority of the 4th proximal phalanx with associated osseous  irregularity at the joint. There is abnormal marrow signal the 4th  middle phalanx on STIR images. Additionally there is STIR  hyperintensity of the 5th proximal and middle phalanges with subtle  loss of normal T1 marrow signal (series 8, image 21). There is marrow  edema of the 5th metatarsal head. Mild patchy marrow edema involving  the base of the 3rd proximal phalanx. Likely pathologic fracture of  the 4th metatarsal neck.    Muscles:  Edema and atrophy of the plantar foot musculature,  compatible with diabetic myopathy and/or myositis.    Miscellaneous:  Visualized tendons and Lisfranc ligament are grossly  intact.    Impression  1. Osteomyelitis of the 4th metatarsal, 4th proximal phalanx, and  high likelihood of osteomyelitis of the 4th middle phalanx, as above.  Pathologic fracture of 4th metatarsal neck.  2. Osteomyelitis of the 5th proximal and middle phalanges, as above.  High likelihood of osteolysis of the 5th metatarsal head.  3. High likelihood of osteomyelitis 3rd proximal phalangeal base.  4. Multifocal soft tissue ulceration, cellulitis, and likely small  subcutaneous abscess, as above.  5.  Postsurgical changes from prior 1st digit interphalangeal and 2nd  digit MTP joint amputations      I personally reviewed the images/study and I agree with the resident  Rubio Toledo's findings as stated. This study was interpreted  at [facility], [place], [place].         Assessment/Plan   ASSESSMENT & PLAN:  Shaw is a 59 y.o. female  who presents for Left foot infection, is on day 9 of admission.    #Osteomyelitis of L foot  #Diabetic foot infection, recurrent  #T2DM   S/P Left Open TMA POD#7 (DOS: 5/25/25) and Left Delayed primary closure with lower extremity debridement POD#5 (5/27/25) with Dr. Ramos   - Patient was seen and evaluated; all findings were discussed and all questions were answered to patient's satisfaction.  - Charts, labs, vitals and imaging all reviewed.   - Imaging: see above attached. Osteomyelitis of 3rd and 4th metatarsal with abscess present.     Plan:  - Antibiotics: Per Primary team/ID  - Patient surgical incision is progressing well.   - Dressings:  betadine soaked adaptic. Betadine gauze 4x4's Kerlix, ACE wrap, Xeroform and gauze to anterior ankle   - Nursing staff is able to change/reinforce dressing if & as necessary until next dressing change. Thank you.  - Incision is healing well with no gaping or necrosis noted,  - Patient is optimized for discharge from podiatry's standpoint.  - Podiatry will peripherally follow while in house.    Follow-up with Dr. Andria Uribe at [facility] next Monday 6/2/25.   DVT ppx: Per Primary team  Weightbearing: NWB LLE WBAT RLE      Patient evaluated/discussed with attending Dr. Andria Uribe DPM    Case to be discussed with attending, A&P above reflects a tentative plan. Please await for the final signature from the attending physician on service.      Barrington Llanos DPM PGY-3  Podiatric Medicine & Surgery  [id number]   Epic Haiku chat preferred          [1]   Past Medical History:  Diagnosis Date     CKD (chronic kidney disease)     Hyperlipidemia     Hypertension     Hypertensive heart and kidney disease with HF and with CKD stage IV 2025    Personal history of other diseases of the circulatory system     History of hypertension    Personal history of other endocrine, nutritional and metabolic disease     History of hyperlipidemia    Personal history of other endocrine, nutritional and metabolic disease     History of diabetes mellitus   [2]   Past Surgical History:  Procedure Laterality Date    CARDIAC CATHETERIZATION N/A 2025    Procedure: Right Heart Cath;  Surgeon: Migel Stanton MD;  Location: [place] [address] Cardiac Cath Lab;  Service: Cardiovascular;  Laterality: N/A;    CARDIAC CATHETERIZATION N/A 2025    Procedure: Right Heart Cath;  Surgeon: Ana Lea MD;  Location: [place] [address] Cardiac Cath Lab;  Service: Cardiovascular;  Laterality: N/A;    CT ANGIO NECK  2023    CT NECK ANGIO W AND WO IV CONTRAST 2023 DOCTOR OFFICE LEGACY    CT HEAD ANGIO W AND WO IV CONTRAST  2023    CT HEAD ANGIO W AND WO IV CONTRAST 2023 DOCTOR OFFICE LEGACY    OTHER SURGICAL HISTORY  10/21/2020    Toe amputation   [3]   Family History  Problem Relation Name Age of Onset    Alzheimer's disease Mother      Diabetes Mother      Lung cancer Mother      Diabetes Father      Lung cancer Father      Pancreatic cancer Father      Diabetes Sister      Lung cancer Sister     [4]   Social History  Tobacco Use    Smoking status: Never     Passive exposure: Never    Smokeless tobacco: Never   Vaping Use    Vaping status: Never Used   Substance Use Topics    Alcohol use: Yes    Drug use: Never   [5]   Medications Prior to Admission   Medication Sig Dispense Refill Last Dose/Taking    carvedilol (Coreg) 12.5 mg tablet Take 1 tablet (12.5 mg) by mouth 2 times a day. 60 tablet 1 Taking    [] amoxicillin-clavulanate (Augmentin) 875-125 mg tablet Take 1 tablet (875 mg) by mouth 2 times a day for  "10 days. 20 tablet 0     atorvastatin (Lipitor) 40 mg tablet Take 1 tablet (40 mg) by mouth once daily. 30 tablet 1     B complex-vitamin C-folic acid (Nephro-Duke) 0.8 mg tablet Take 1 tablet by mouth once daily. 90 tablet 0     blood-glucose sensor (FreeStyle Remington 3 Sensor) device Use to monitor blood glucose. Change sensor every 14 days. 2 each 11     [] ferrous sulfate tablet Take 1 tablet (325 mg) by mouth once daily with breakfast. 90 tablet 0     FreeStyle Remington 3 [place] misc Use as instructed to monitor blood glucose. 1 each 0     gabapentin (Neurontin) 300 mg capsule Take 1 capsule (300 mg) by mouth 2 times a day. 180 capsule 3     hydrALAZINE (Apresoline) 50 mg tablet Take 1 tablet (50 mg) by mouth 3 times a day. 270 tablet 3     insulin glargine (Basaglar KwikPen U-100 Insulin) 100 unit/mL (3 mL) pen Inject 15 Units under the skin once daily in the morning. Take as directed per insulin instructions.       insulin lispro (Admelog SoloStar U-100 Insulin) 100 unit/mL injection Inject per sliding scale instructions under the skin 3 times a day with meals. (Max daily dose 70 units) 15 mL 1     isosorbide mononitrate ER (Imdur) 30 mg 24 hr tablet Take 1 tablet (30 mg) by mouth once daily. Do not crush or chew. 30 tablet 3     OneTouch Delica Plus Lancet 30 gauge misc USE TO TEST BLOOD SUGAR AS DIRECTED 200 each 0     OneTouch Ultra Test strip Use 1 strip BID to check glucose 200 strip 1     OneTouch Ultra2 Meter misc use 1 to 2 times daily 1 each 0     oxyCODONE (Roxicodone) 5 mg immediate release tablet Take 1 tablet (5 mg) by mouth every 4 hours if needed for severe pain (7 - 10) or moderate pain (4 - 6). 42 tablet 0     pen needle, diabetic 32 gauge x \" needle Use four times a day with insulin use 360 each 1     [] sodium hypochlorite (Dakin's HALF-Strength) 0.25 % external solution Apply topically 1 time for 1 dose. Use for daily dressing changes for left foot 473 mL 2     tirzepatide " (Mounjaro) 5 mg/0.5 mL pen injector Inject 5 mg under the skin 1 (one) time per week. 2 mL 1     torsemide (Demadex) 20 mg tablet Take 2 tablets (40 mg) by mouth once daily. 120 tablet 0    [6] No Known Allergies  [7] amoxicillin-clavulanate, 1 tablet, oral, q24h  atorvastatin, 40 mg, oral, Nightly  carvedilol, 12.5 mg, oral, BID  [Held by provider] gabapentin, 300 mg, oral, BID  heparin (porcine), 7,500 Units, subcutaneous, q8h  hydrALAZINE, 50 mg, oral, TID  insulin lispro, 0-15 Units, subcutaneous, TID AC  isosorbide mononitrate ER, 30 mg, oral, Daily  lidocaine, 5 mL, infiltration, Once  nystatin, 1 Application, Topical, BID  [START ON 6/3/2025] pantoprazole, 40 mg, oral, Daily before breakfast  polyethylene glycol, 17 g, oral, Daily  tamsulosin, 0.8 mg, oral, Daily  torsemide, 40 mg, oral, Daily  vitamin B complex-vitamin C-folic acid, 1 capsule, oral, Daily     [8]    [9] PRN medications: acetaminophen, alum-mag hydroxide-simeth, dextrose, dextrose, glucagon, glucagon, loperamide

## 2025-06-02 NOTE — CARE PLAN
The patient's goals for the shift include Pt would like to sit up to the side of the bed.    The clinical goals for the shift include Pt to be free from falls      Problem: Diabetes  Goal: Achieve decreasing blood glucose levels by end of shift  Outcome: Progressing  Goal: Increase stability of blood glucose readings by end of shift  Outcome: Progressing  Goal: Decrease in ketones present in urine by end of shift  Outcome: Progressing  Goal: Maintain electrolyte levels within acceptable range throughout shift  Outcome: Progressing  Goal: Maintain glucose levels >70mg/dl to <250mg/dl throughout shift  Outcome: Progressing  Goal: No changes in neurological exam by end of shift  Outcome: Progressing  Goal: Learn about and adhere to nutrition recommendations by end of shift  Outcome: Progressing  Goal: Vital signs within normal range for age by end of shift  Outcome: Progressing  Goal: Increase self care and/or family involovement by end of shift  Outcome: Progressing  Goal: Receive DSME education by end of shift  Outcome: Progressing     Problem: Skin  Goal: Decreased wound size/increased tissue granulation at next dressing change  Outcome: Progressing  Goal: Participates in plan/prevention/treatment measures  Outcome: Progressing  Goal: Prevent/manage excess moisture  Outcome: Progressing  Goal: Prevent/minimize sheer/friction injuries  Outcome: Progressing  Goal: Promote/optimize nutrition  Outcome: Progressing  Goal: Promote skin healing  Outcome: Progressing     Problem: Fall/Injury  Goal: Not fall by end of shift  Outcome: Progressing  Goal: Be free from injury by end of the shift  Outcome: Progressing  Goal: Verbalize understanding of personal risk factors for fall in the hospital  Outcome: Progressing  Goal: Verbalize understanding of risk factor reduction measures to prevent injury from fall in the home  Outcome: Progressing  Goal: Use assistive devices by end of the shift  Outcome: Progressing  Goal: Pace  activities to prevent fatigue by end of the shift  Outcome: Progressing

## 2025-06-02 NOTE — PROGRESS NOTES
Medication Adjustment    The following medication(s) was/were adjusted for Nuris Squires per protocol/policy due to [facility] approved/hospital use guidelines. The patient meets criteria for Pharmacist Initiated IV to PO Conversion (MM-15). The patient is hemodynamically stable, has a functioning GI tract, tolerating a diet, and taking other medications orally. Confirmed patient is able to take PO medications with LORENA Faulkner .      Medication(s) adjusted:   IV to PO PPI     Odette Squires, PharmD

## 2025-06-02 NOTE — CARE PLAN
The patient's goals for the shift include   The clinical goals for the shift include Pt will remain HDS      Problem: Skin  Goal: Decreased wound size/increased tissue granulation at next dressing change  Outcome: Progressing  Flowsheets (Taken 6/1/2025 2301)  Decreased wound size/increased tissue granulation at next dressing change: Promote sleep for wound healing  Goal: Participates in plan/prevention/treatment measures  Outcome: Progressing  Flowsheets (Taken 6/1/2025 2301)  Participates in plan/prevention/treatment measures: Elevate heels  Goal: Prevent/manage excess moisture  Outcome: Progressing  Goal: Prevent/minimize sheer/friction injuries  Outcome: Progressing  Flowsheets (Taken 6/1/2025 2301)  Prevent/minimize sheer/friction injuries: Turn/reposition every 2 hours/use positioning/transfer devices  Goal: Promote/optimize nutrition  Outcome: Progressing  Flowsheets (Taken 6/1/2025 2301)  Promote/optimize nutrition: Monitor/record intake including meals  Goal: Promote skin healing  Outcome: Progressing  Flowsheets (Taken 6/1/2025 2301)  Promote skin healing: Turn/reposition every 2 hours/use positioning/transfer devices

## 2025-06-02 NOTE — PROGRESS NOTES
Nuris Squires is a 59 y.o. female on day 9 of admission presenting with Left foot infection.        Objective     Last Recorded Vitals  /63   Pulse 74   Temp 36.3 °C (97.3 °F)   Resp 16   Wt 102 kg (225 lb)   SpO2 94%   Intake/Output last 3 Shifts:    Intake/Output Summary (Last 24 hours) at 6/2/2025 1128  Last data filed at 6/2/2025 0640  Gross per 24 hour   Intake --   Output 1500 ml   Net -1500 ml       Admission Weight  Weight: 102 kg (225 lb) (05/24/25 1307)    Daily Weight  05/24/25 : 102 kg (225 lb)    Image Results  US renal complete  Narrative: Interpreted By:  Francis Mcgee,  and Jett Verdugo   STUDY:  US RENAL COMPLETE;  5/31/2025 4:00 pm      INDICATION:  Signs/Symptoms:VENKAT on CKD  Renal team requested to repeat US.          COMPARISON:  Renal ultrasound 05/30/2025      ACCESSION NUMBER(S):  [id number]      ORDERING CLINICIAN:  ALEX SCHROEDER      TECHNIQUE:  Multiple images of the kidneys were obtained  .      FINDINGS:  RIGHT KIDNEY:  The right kidney measures 10.9 cm in length. Increased renal cortical  echogenicity. Renal cortical thickness is within normal limits. No  hydronephrosis is present; no evidence of nephrolithiasis.      LEFT KIDNEY:  The left kidney measures 10.1 cm in length. A circumscribed anechoic  lesion is seen in the renal midpole measuring 0.9 x 0.6 x 0.7 cm is  compatible with a simple cyst. The renal cortical echogenicity is  increased. Renal cortical thickness are within normal limits. No  hydronephrosis is present; no evidence of nephrolithiasis.      BLADDER:  A Louis catheter is present within a decompressed urinary bladder.      Impression: Bilateral increased renal cortical echogenicity can be seen the  setting of medical renal disease.      I personally reviewed the images/study and I agree with the findings  as stated by Kartik Frausto MD (resident) . This study was  interpreted at [facility]  [place], [place].      MACRO:  None      Signed by: Francis Mcgee 5/31/2025 6:56 PM  Dictation workstation:   [id number]      Physical Exam  Constitutional: Pleasant elderly female, alert active, cooperative not in acute distress  Eyes: PERRLA, clear sclera  ENMT: Moist mucosal membranes, no exudate  Head / Neck: Atraumatic, normocephalic, supple neck, JVP not visualized  Lungs: Patent airways, CTABL  Heart: RRR, S1S2, no murmurs appreciated, palpable pulses in all extremities  GI: Soft, NT, ND, bowel sounds present in all quadrants  MSK: Moves all extremities freely, no restriction  of ROM, no joint edema  Extremities: Left foot covered with dressing dry and intact,  Midline insertion in the right arm, no peripheral edema  Neurological: AAO x 3 to person, place and date, facial muscles symmetrical, sensation intact, strength 4/4, no acute focal neurological deficits appreciated  Psychological: Appropriate mood and behavior    Relevant Results                Scheduled medications  Scheduled Medications[1]  Continuous medications  Continuous Medications[2]  PRN medications  PRN Medications[3]    Assessment & Plan  Left foot infection    Chronic ulcer of left foot with fat layer exposed (Multi)    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 female PMH of PAD, CKD, T2DM, HTN, digital amputation from gangrene, HLD, morbid obesity, CKD and HFpEF presenting with Left foot infection.  Osteomyelitis of the left foot, status post left 4th and 5th digit metatarsal amputation and washout, podiatry consulted. Renal team was consulted for VENKAT on CKD 4, and for HD consideration. Currently, she has no indication for HD,. However, she needs daily evaluation and daily RFP. Need to follow daily  with Renal team. OF note, her hospital course was complicated with urinary retention, adhikari was removed 6/1. Planning for TOV. Requested bladder scan Q6 hours. Re antibiotivs regimen, ID tea advised to dc vancomycin and Zosyn and start Augmentin 500 mg daily (renal dosing) 2-4 weeks. Dispo  SNF eventually.           Subjective      No events           AP      #VENKAT on CKD, stage 5, worsening.  -She was CKD 3B vs IV Jan 2025. BS Cr 1.68 --> 5 this admission    -Requested UA, urine c/s, urine lytes, C3, C4   -Renal consulted.  -I&O.  -Avoid Renal toxic med.  -Switch the enoxaparin to heparin 7500 subcutaneous q 8 hours  -Daily RFP, Renal team to continue to follow and reassess indication for HD      #Urinary retention S/p surgery vs Med induced (Oxy).   -Avoid constipation. Renal US reviewed     -Adhikari removed 6/1 PM, requested bladder scan q6 hours, Resumed tamsulosin  0.8 mg daily     #Osteomyelitis of L foot  #Diabetic gas forming infection of left foot   #Status post podiatry intervention with  L 4th and 5th metatarsal amputation and washout  on 5/25  -Wound cultures requested     -Podiatry consulted: s/p left foot 4th and 5th digit amputation, scheduled for Left foot 5/27:  Delayed Primary Closure, Bone cortex Excision, Reverse Sural Flap 5/27/25.   -Per ID team: Discontinued vancomycin and discontinue Zosyn. Started Augmentin 500 mg daily (renal dosing) 2-4 weeks.   -PTOT, mobilize pt as tolerated, NWB to LLE.   -Post op shoe to the left foot requested already.   -Reports  pain is controlled.  -Wound care instruction requested: betadine soaked Adaptic, 4 x 4's ABD pad, kerlix, ace wrap. Pt will follow with Dr. Uribe at [facility] on Monday, appt is already scheduled    -Dispo  SNF eventually      #Acute on chronic anemia. Stable   #HFpEF/HTN  #T2DM          Diet  - Diabetic Renal  DM  - Dispo SNF eventually  TCC was updated     ADOD in 1 - 2 days      Mikala Escoto MD           [1] amoxicillin-clavulanate, 1  tablet, oral, q24h  atorvastatin, 40 mg, oral, Nightly  carvedilol, 12.5 mg, oral, BID  [Held by provider] gabapentin, 300 mg, oral, BID  heparin (porcine), 7,500 Units, subcutaneous, q8h  hydrALAZINE, 50 mg, oral, TID  insulin lispro, 0-15 Units, subcutaneous, TID AC  isosorbide mononitrate ER, 30 mg, oral, Daily  lidocaine, 5 mL, infiltration, Once  nystatin, 1 Application, Topical, BID  [START ON 6/3/2025] pantoprazole, 40 mg, oral, Daily before breakfast  polyethylene glycol, 17 g, oral, Daily  tamsulosin, 0.8 mg, oral, Daily  torsemide, 40 mg, oral, Daily  vitamin B complex-vitamin C-folic acid, 1 capsule, oral, Daily     [2]    [3] PRN medications: acetaminophen, alum-mag hydroxide-simeth, dextrose, dextrose, glucagon, glucagon, loperamide

## 2025-06-02 NOTE — PROGRESS NOTES
Nuris Squires is a 59 y.o. female on day 9 of admission presenting with Left foot infection. Nephrology following for VENKAT on CKD.      Subjective   Patient endorses return of urine function with good urine output since last night. Declines any hematuria, dysuria, hesitancy, or frequency. Denies any vaginal discomfort or itching. Denies any flank pain or abdominal pain. She has been eating well and overall feels improved.    Objective    Vitals 24HR  Heart Rate:  [69-74]   Temp:  [36.3 °C (97.3 °F)-37 °C (98.6 °F)]   Resp:  [16]   BP: (113-155)/(50-63)   SpO2:  [94 %-96 %]     Intake/Output last 3 Shifts:  5/30: 1600 Urine Output, No input recorded  5/31: 1500 urine output, no input recorded  Intake/Output Summary (Last 24 hours) at 6/2/2025 1431  Last data filed at 6/2/2025 0640  Gross per 24 hour   Intake --   Output 1500 ml   Net -1500 ml     Physical Exam  Constitutional:       General: She is not in acute distress.     Appearance: Normal appearance. She is not toxic-appearing.   HENT:      Head: Normocephalic and atraumatic.      Mouth/Throat:      Mouth: Mucous membranes are moist.   Cardiovascular:      Rate and Rhythm: Normal rate and regular rhythm.      Pulses: Normal pulses.      Heart sounds: Normal heart sounds.   Pulmonary:      Effort: Pulmonary effort is normal.      Breath sounds: Normal breath sounds.   Abdominal:      General: Abdomen is flat.      Palpations: Abdomen is soft.   Musculoskeletal:         General: Swelling present.      Left lower leg: Edema present.   Neurological:      Mental Status: She is alert.       Results from last 72 hours   Lab Units 06/02/25  1115 06/01/25  0717 05/31/25  0602   GLUCOSE mg/dL 249* 157* 180*   SODIUM mmol/L 137 140 137   POTASSIUM mmol/L 4.2 4.2 4.5   CHLORIDE mmol/L 107 109* 106   CO2 mmol/L 20* 19* 20*   BUN mg/dL 62* 56* 53*   CREATININE mg/dL 6.23* 7.00* 7.05*   ANION GAP mmol/L 14 16 16   EGFR mL/min/1.73m*2 7* 6* 6*   CALCIUM mg/dL 8.5* 7.6* 7.7*    PHOSPHORUS mg/dL 6.7* 7.1* 6.9*   ALBUMIN g/dL 2.8* 2.4* 2.6*   WBC AUTO x10*3/uL 8.2 7.5 8.0   HEMOGLOBIN g/dL 8.8* 7.2* 7.6*   HEMATOCRIT % 29.0* 23.4* 25.3*   PLATELETS AUTO x10*3/uL 398 321 320     Renal US 05/30  1. The right kidney is mildly echogenic, which can be seen in the setting of medical renal disease. No hydronephrosis on the right.  2. Extremely limited evaluation of the left kidney due to overlying bowel gas.  3. Small amount of debris layering within the urinary bladder without evidence of urinary bladder wall thickening or pericystic fluid. Findings are nonspecific, but could be seen in the setting of acute cystitis.    Assessment & Plan    Ms. 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 She presented 5/23 from wound clinic per Dr. Zelaya DPM for osteomyelitis of L foot status post left 4th and 5th digit metatarsal amputation and washout. Nephrology was consulted for anuric VENKAT on CKD 4, likely 2/2 Vanco associated ATN and urinary retention. Pt now with improving labs and resolution of anuria.     #Non-Oliguric VENKAT on CKD 4  - Baseline creatinine: 2.8-2.7 (Cr 4.48 historical peak 1/19/25 admission)   - Admission BUN/ Cr: 34/2.46   - Latest BUN/Cr: 62/6.23 (Cr peak 5/31 7.05) - improving  - Etiology: Likely MAULIK in setting of advanced CKD, Vanco associated ATN and urinary retention (resolved)  - UA showed:  Proteins 2+, RBCs>20, WBC>50, FeNA is 2 % suggesting intrinsic Kidney injury  - C3, C4 wnl  - Clinical volume status: Euvolemic  - Electrolytes (Na, K, Ca, Phos) stable  - Acid base status: HAGMA -> NAGMA in setting of VENKAT and CKD  -UOP documented is 1500 x 24 hr     #Drowsiness- Improved  - Held home Meagan and drowsiness is improved    Recommendations:  - No indication for dialysis at this time. Patient with improving Cr and renal function in setting of resumption of urination. Will continue to evaluate daily. She  needs close follow up with nephrology and dialysis education as this is second admission in 1 year making her high risk for progression.  - Avoid nephrotoxins and contrast if possible  - Please obtain strict Is/Os and daily BMP to trend BUN/Cr and electrolytes  - Continue holding home Gabapentin iso recent drowsiness and VENKAT.  - C/w renal dosing for medications for latest eGFR, follow medication trough as appropriate  - Avoid hypotension/rapid fluctuations in Bps (pt currently normotensive with stable hemodynamic status with Carvedilol, Tamsulosin, and Imdur, no indications to hold said medications at this time).    Nikkie Shetty MD MPH  Internal Medicine, PGY1    24 Hour Renal Pager- [phone]  Discussed with attending Nephrologist.

## 2025-06-02 NOTE — PROGRESS NOTES
Transitional Care Coordination Progress Note:  Patient discussed during interdisciplinary rounds.   Team members present: THOMAS BE  Plan per Medical/Surgical team: Left foot infection  Payor: [employer]  Discharge disposition: [facility]  Potential Barriers: medical  ADOD: 2-3 days  Pending Nephrology final recommendations and labs. Updated notes sent to facility. Patient will require precert closer to being discharge ready. Will continue to follow for discharge planning needs.     Melissa LUXN, RN  Transitional Care Coordinator (TCC)  [phone]

## 2025-06-03 LAB
ALBUMIN SERPL BCP-MCNC: 2.7 G/DL (ref 3.4–5)
ANION GAP SERPL CALC-SCNC: 15 MMOL/L (ref 10–20)
BACTERIA FLD CULT: ABNORMAL
BACTERIA FLD CULT: ABNORMAL
BASOPHILS # BLD AUTO: 0.04 X10*3/UL (ref 0–0.1)
BASOPHILS NFR BLD AUTO: 0.5 %
BUN SERPL-MCNC: 55 MG/DL (ref 6–23)
CALCIUM SERPL-MCNC: 8.3 MG/DL (ref 8.6–10.6)
CHLORIDE SERPL-SCNC: 108 MMOL/L (ref 98–107)
CO2 SERPL-SCNC: 20 MMOL/L (ref 21–32)
CREAT SERPL-MCNC: 5.98 MG/DL (ref 0.5–1.05)
EGFRCR SERPLBLD CKD-EPI 2021: 8 ML/MIN/1.73M*2
EOSINOPHIL # BLD AUTO: 0.27 X10*3/UL (ref 0–0.7)
EOSINOPHIL NFR BLD AUTO: 3.4 %
ERYTHROCYTE [DISTWIDTH] IN BLOOD BY AUTOMATED COUNT: 14.7 % (ref 11.5–14.5)
GLUCOSE BLD MANUAL STRIP-MCNC: 184 MG/DL (ref 74–99)
GLUCOSE BLD MANUAL STRIP-MCNC: 205 MG/DL (ref 74–99)
GLUCOSE BLD MANUAL STRIP-MCNC: 292 MG/DL (ref 74–99)
GLUCOSE SERPL-MCNC: 171 MG/DL (ref 74–99)
GRAM STN SPEC: ABNORMAL
GRAM STN SPEC: ABNORMAL
HCT VFR BLD AUTO: 28.6 % (ref 36–46)
HGB BLD-MCNC: 8.1 G/DL (ref 12–16)
IMM GRANULOCYTES # BLD AUTO: 0.04 X10*3/UL (ref 0–0.7)
IMM GRANULOCYTES NFR BLD AUTO: 0.5 % (ref 0–0.9)
LYMPHOCYTES # BLD AUTO: 2.02 X10*3/UL (ref 1.2–4.8)
LYMPHOCYTES NFR BLD AUTO: 25.5 %
MCH RBC QN AUTO: 29.6 PG (ref 26–34)
MCHC RBC AUTO-ENTMCNC: 28.3 G/DL (ref 32–36)
MCV RBC AUTO: 104 FL (ref 80–100)
MONOCYTES # BLD AUTO: 0.56 X10*3/UL (ref 0.1–1)
MONOCYTES NFR BLD AUTO: 7.1 %
NEUTROPHILS # BLD AUTO: 4.98 X10*3/UL (ref 1.2–7.7)
NEUTROPHILS NFR BLD AUTO: 63 %
NRBC BLD-RTO: 0 /100 WBCS (ref 0–0)
PHOSPHATE SERPL-MCNC: 6.2 MG/DL (ref 2.5–4.9)
PLATELET # BLD AUTO: 381 X10*3/UL (ref 150–450)
POTASSIUM SERPL-SCNC: 4.2 MMOL/L (ref 3.5–5.3)
RBC # BLD AUTO: 2.74 X10*6/UL (ref 4–5.2)
SODIUM SERPL-SCNC: 139 MMOL/L (ref 136–145)
WBC # BLD AUTO: 7.9 X10*3/UL (ref 4.4–11.3)

## 2025-06-03 PROCEDURE — 2500000001 HC RX 250 WO HCPCS SELF ADMINISTERED DRUGS (ALT 637 FOR MEDICARE OP): Performed by: STUDENT IN AN ORGANIZED HEALTH CARE EDUCATION/TRAINING PROGRAM

## 2025-06-03 PROCEDURE — 99233 SBSQ HOSP IP/OBS HIGH 50: CPT | Performed by: STUDENT IN AN ORGANIZED HEALTH CARE EDUCATION/TRAINING PROGRAM

## 2025-06-03 PROCEDURE — 85025 COMPLETE CBC W/AUTO DIFF WBC: CPT | Performed by: STUDENT IN AN ORGANIZED HEALTH CARE EDUCATION/TRAINING PROGRAM

## 2025-06-03 PROCEDURE — 97110 THERAPEUTIC EXERCISES: CPT | Mod: GP

## 2025-06-03 PROCEDURE — 2500000001 HC RX 250 WO HCPCS SELF ADMINISTERED DRUGS (ALT 637 FOR MEDICARE OP)

## 2025-06-03 PROCEDURE — 36415 COLL VENOUS BLD VENIPUNCTURE: CPT

## 2025-06-03 PROCEDURE — 97116 GAIT TRAINING THERAPY: CPT | Mod: GP

## 2025-06-03 PROCEDURE — 1100000001 HC PRIVATE ROOM DAILY

## 2025-06-03 PROCEDURE — 2500000004 HC RX 250 GENERAL PHARMACY W/ HCPCS (ALT 636 FOR OP/ED): Performed by: STUDENT IN AN ORGANIZED HEALTH CARE EDUCATION/TRAINING PROGRAM

## 2025-06-03 PROCEDURE — 82947 ASSAY GLUCOSE BLOOD QUANT: CPT

## 2025-06-03 PROCEDURE — 84100 ASSAY OF PHOSPHORUS: CPT

## 2025-06-03 PROCEDURE — 2500000002 HC RX 250 W HCPCS SELF ADMINISTERED DRUGS (ALT 637 FOR MEDICARE OP, ALT 636 FOR OP/ED): Performed by: STUDENT IN AN ORGANIZED HEALTH CARE EDUCATION/TRAINING PROGRAM

## 2025-06-03 PROCEDURE — 97110 THERAPEUTIC EXERCISES: CPT | Mod: GO

## 2025-06-03 RX ADMIN — CARVEDILOL 12.5 MG: 12.5 TABLET, FILM COATED ORAL at 08:23

## 2025-06-03 RX ADMIN — TAMSULOSIN HYDROCHLORIDE 0.8 MG: 0.4 CAPSULE ORAL at 08:23

## 2025-06-03 RX ADMIN — ISOSORBIDE MONONITRATE 30 MG: 30 TABLET, EXTENDED RELEASE ORAL at 08:23

## 2025-06-03 RX ADMIN — ASCORBIC ACID, THIAMINE MONONITRATE,RIBOFLAVIN, NIACINAMIDE, PYRIDOXINE HYDROCHLORIDE, FOLIC ACID, CYANOCOBALAMIN, BIOTIN, CALCIUM PANTOTHENATE, 1 CAPSULE: 100; 1.5; 1.7; 20; 10; 1; 6000; 150000; 5 CAPSULE, LIQUID FILLED ORAL at 08:23

## 2025-06-03 RX ADMIN — HYDRALAZINE HYDROCHLORIDE 50 MG: 50 TABLET ORAL at 20:26

## 2025-06-03 RX ADMIN — HYDRALAZINE HYDROCHLORIDE 50 MG: 50 TABLET ORAL at 08:23

## 2025-06-03 RX ADMIN — INSULIN LISPRO 6 UNITS: 100 INJECTION, SOLUTION INTRAVENOUS; SUBCUTANEOUS at 17:54

## 2025-06-03 RX ADMIN — HEPARIN SODIUM 7500 UNITS: 5000 INJECTION INTRAVENOUS; SUBCUTANEOUS at 20:26

## 2025-06-03 RX ADMIN — CARVEDILOL 12.5 MG: 12.5 TABLET, FILM COATED ORAL at 20:26

## 2025-06-03 RX ADMIN — NYSTATIN 1 APPLICATION: 100000 POWDER TOPICAL at 08:26

## 2025-06-03 RX ADMIN — INSULIN LISPRO 9 UNITS: 100 INJECTION, SOLUTION INTRAVENOUS; SUBCUTANEOUS at 13:02

## 2025-06-03 RX ADMIN — AMOXICILLIN AND CLAVULANATE POTASSIUM 1 TABLET: 500; 125 TABLET, FILM COATED ORAL at 12:55

## 2025-06-03 RX ADMIN — HEPARIN SODIUM 7500 UNITS: 5000 INJECTION INTRAVENOUS; SUBCUTANEOUS at 04:33

## 2025-06-03 RX ADMIN — HYDRALAZINE HYDROCHLORIDE 50 MG: 50 TABLET ORAL at 15:24

## 2025-06-03 RX ADMIN — INSULIN LISPRO 3 UNITS: 100 INJECTION, SOLUTION INTRAVENOUS; SUBCUTANEOUS at 08:23

## 2025-06-03 RX ADMIN — ATORVASTATIN CALCIUM 40 MG: 40 TABLET, FILM COATED ORAL at 20:27

## 2025-06-03 RX ADMIN — HEPARIN SODIUM 7500 UNITS: 5000 INJECTION INTRAVENOUS; SUBCUTANEOUS at 12:55

## 2025-06-03 RX ADMIN — PANTOPRAZOLE SODIUM 40 MG: 40 TABLET, DELAYED RELEASE ORAL at 04:50

## 2025-06-03 RX ADMIN — TORSEMIDE 40 MG: 20 TABLET ORAL at 08:23

## 2025-06-03 ASSESSMENT — COGNITIVE AND FUNCTIONAL STATUS - GENERAL
MOVING FROM LYING ON BACK TO SITTING ON SIDE OF FLAT BED WITH BEDRAILS: A LITTLE
PERSONAL GROOMING: A LITTLE
DRESSING REGULAR UPPER BODY CLOTHING: A LOT
MOBILITY SCORE: 11
HELP NEEDED FOR BATHING: A LOT
STANDING UP FROM CHAIR USING ARMS: A LOT
MOVING TO AND FROM BED TO CHAIR: A LOT
TURNING FROM BACK TO SIDE WHILE IN FLAT BAD: A LOT
TOILETING: A LOT
CLIMB 3 TO 5 STEPS WITH RAILING: TOTAL
DAILY ACTIVITIY SCORE: 15
MOVING TO AND FROM BED TO CHAIR: A LOT
WALKING IN HOSPITAL ROOM: A LOT
WALKING IN HOSPITAL ROOM: A LOT
STANDING UP FROM CHAIR USING ARMS: TOTAL
MOBILITY SCORE: 13
MOVING FROM LYING ON BACK TO SITTING ON SIDE OF FLAT BED WITH BEDRAILS: A LITTLE
DRESSING REGULAR LOWER BODY CLOTHING: A LOT
TURNING FROM BACK TO SIDE WHILE IN FLAT BAD: A LITTLE
CLIMB 3 TO 5 STEPS WITH RAILING: TOTAL

## 2025-06-03 ASSESSMENT — PAIN SCALES - GENERAL
PAINLEVEL_OUTOF10: 0 - NO PAIN
PAINLEVEL_OUTOF10: 0 - NO PAIN

## 2025-06-03 ASSESSMENT — PAIN - FUNCTIONAL ASSESSMENT: PAIN_FUNCTIONAL_ASSESSMENT: 0-10

## 2025-06-03 NOTE — CARE PLAN
The patient's goals for the shift include Pt would like to sit up to the side of the bed.    The clinical goals for the shift include Patient will remain safe and free of falls this shift      Problem: Pain - Adult  Goal: Verbalizes/displays adequate comfort level or baseline comfort level  Outcome: Progressing     Problem: Safety - Adult  Goal: Free from fall injury  Outcome: Progressing     Problem: Discharge Planning  Goal: Discharge to home or other facility with appropriate resources  Outcome: Progressing     Problem: Chronic Conditions and Co-morbidities  Goal: Patient's chronic conditions and co-morbidity symptoms are monitored and maintained or improved  Outcome: Progressing     Problem: Nutrition  Goal: Nutrient intake appropriate for maintaining nutritional needs  Outcome: Progressing     Problem: Diabetes  Goal: Achieve decreasing blood glucose levels by end of shift  Outcome: Progressing  Goal: Increase stability of blood glucose readings by end of shift  Outcome: Progressing  Goal: Decrease in ketones present in urine by end of shift  Outcome: Progressing  Goal: Maintain electrolyte levels within acceptable range throughout shift  Outcome: Progressing  Goal: Maintain glucose levels >70mg/dl to <250mg/dl throughout shift  Outcome: Progressing  Goal: No changes in neurological exam by end of shift  Outcome: Progressing  Goal: Learn about and adhere to nutrition recommendations by end of shift  Outcome: Progressing  Goal: Vital signs within normal range for age by end of shift  Outcome: Progressing  Goal: Increase self care and/or family involovement by end of shift  Outcome: Progressing  Goal: Receive DSME education by end of shift  Outcome: Progressing     Problem: Skin  Goal: Decreased wound size/increased tissue granulation at next dressing change  Outcome: Progressing  Goal: Participates in plan/prevention/treatment measures  Outcome: Progressing  Goal: Prevent/manage excess moisture  Outcome:  Progressing  Goal: Prevent/minimize sheer/friction injuries  Outcome: Progressing  Goal: Promote/optimize nutrition  Outcome: Progressing  Goal: Promote skin healing  Outcome: Progressing     Problem: Fall/Injury  Goal: Not fall by end of shift  Outcome: Progressing  Goal: Be free from injury by end of the shift  Outcome: Progressing  Goal: Verbalize understanding of personal risk factors for fall in the hospital  Outcome: Progressing  Goal: Verbalize understanding of risk factor reduction measures to prevent injury from fall in the home  Outcome: Progressing  Goal: Use assistive devices by end of the shift  Outcome: Progressing  Goal: Pace activities to prevent fatigue by end of the shift  Outcome: Progressing

## 2025-06-03 NOTE — PROGRESS NOTES
Physical Therapy    Physical Therapy Treatment    Patient Name: Nuris Squires  MRN: [id number]  Department: [facility] [id number]  Room: [address] [place], [place] [address]  Today's Date: 6/3/2025  Time Calculation  Start Time: 0923  Stop Time: 0948  Time Calculation (min): 25 min         Assessment/Plan   PT Assessment  PT Assessment Results: Decreased strength, Decreased mobility, Impaired balance, Orthopedic restrictions  Rehab Prognosis: Good  Barriers to Discharge Home: Caregiver assistance, Physical needs  Caregiver Assistance: Caregiver assistance needed per identified barriers - however, level of patient's required assistance exceeds assistance available at home  Physical Needs: Stair navigation into home limited by function/safety, Ambulating household distances limited by function/safety, Weight bearing precautions unable to be safely maintained, High falls risk due to function or environment  End of Session Communication: Bedside nurse  Assessment Comment: Pt continues to demo decreased strength, limited ability to maintain NWB (L) LE.  Will continue to benefit from skilled PT to progress towards more (I) transfers and maintaining NWB.  End of Session Patient Position: Up in chair, Alarm off, not on at start of session     PT Plan  Treatment/Interventions: Therapeutic exercise, Bed mobility, Transfer training, Gait training, Balance training, Strengthening, Endurance training, Therapeutic activity  PT Plan: Ongoing PT  PT Frequency: 3 times per week  PT Discharge Recommendations: Moderate intensity level of continued care  Equipment Recommended upon Discharge:  (tbd)  PT Recommended Transfer Status: Assist x1, Assistive device  PT - OK to Discharge: Yes (PT eval completed & recs made)    PT Visit Info:  PT Received On: 06/03/25     General Visit Information:   General  Prior to Session Communication: Bedside nurse  Patient Position Received: Alarm off, not on at start of session, Bed, 3 rail up  General Comment: Pt agreeable to participate,  reports has not been up OOB recently but willing to try to get into chair. Demos need for assist to compete standing transfers and pivot/steps to bedside chair, unable to fully maintain NWB on (L) foot.  Will continue to follow.    Subjective   Precautions:  Precautions  LE Weight Bearing Status: Left Non-Weight Bearing  Medical Precautions: Fall precautions  Precautions Comment: post-op shoe somewhat ill fitting on (L) foot, may need smaller size          Objective   Pain:  Pain Assessment  Pain Assessment: 0-10  0-10 (Numeric) Pain Score: 0 - No pain  Cognition:  Cognition  Orientation Level: Oriented X4  Coordination:  Coordination Comment: some difficulty with lower body dressing, unable to fully take sock off (R) foot, needing assist to don (R) sock and (L) shoe  Postural Control:  Postural Control  Postural Control: Within Functional Limits  Static Sitting Balance  Static Sitting-Balance Support: Feet supported  Static Sitting-Level of Assistance: Distant supervision  Static Standing Balance  Static Standing-Balance Support: Bilateral upper extremity supported  Static Standing-Level of Assistance: Minimum assistance  Static Standing-Comment/Number of Minutes: whw  Dynamic Standing Balance  Dynamic Standing-Balance Support: Bilateral upper extremity supported  Dynamic Standing-Level of Assistance: Moderate assistance  Dynamic Standing-Comments: whw    Activity Tolerance:  Activity Tolerance  Endurance: Tolerates 10 - 20 min exercise with multiple rests  Treatments:  Therapeutic Exercise  Therapeutic Exercise Performed: Yes  Therapeutic Exercise Activity 1: (B) LE LAQ x 12 reps cues for slow controlled movements  Therapeutic Exercise Activity 2: (B) LE marches x 12 reps, cues for performance.     Bed Mobility  Bed Mobility: Yes  Bed Mobility 1  Bed Mobility 1: Supine to sitting  Level of Assistance 1: Close supervision  Bed Mobility Comments 1: completes slowly    Ambulation/Gait Training  Ambulation/Gait  Training Performed: Yes  Ambulation/Gait Training 1  Surface 1: Level tile  Device 1: Rolling walker  Assistance 1: Moderate assistance  Quality of Gait 1: Forward flexed posture (unable to maintain NWB,frequently sets heel down)  Comments/Distance (ft) 1: 2 ft  Transfers  Transfer: Yes  Transfer 1  Transfer From 1: Bed to  Transfer to 1: Stand  Transfer Device 1: Walker  Transfer Level of Assistance 1: Moderate assistance, Moderate verbal cues (x1)  Trials/Comments 1: demo cues given prior to completion, pt unable to fully maintain NWB throughout transfer  Transfers 2  Transfer From 2: Stand to  Transfer to 2: Chair with arms  Transfer Level of Assistance 2: Minimum assistance (x1)  Trials/Comments 2: cues for safety  Transfers 3  Transfer From 3: Chair with arms to  Transfer to 3: Stand  Trials/Comments 3: cues for attempts without outside assist, pt unable to complete without touching (L) foot down.    Outcome Measures:  [facility] Basic Mobility  Turning from your back to your side while in a flat bed without using bedrails: A little  Moving from lying on your back to sitting on the side of a flat bed without using bedrails: A little  Moving to and from bed to chair (including a wheelchair): A lot  Standing up from a chair using your arms (e.g. wheelchair or bedside chair): A lot  To walk in hospital room: A lot  Climbing 3-5 steps with railing: Total  Basic Mobility - Total Score: 13    Education Documentation  Mobility Training, taught by Lynne Quintanilla, PT at 6/3/2025 11:24 AM.  Learner: Patient  Readiness: Acceptance  Method: Explanation  Response: Verbalizes Understanding, Demonstrated Understanding, Needs Reinforcement  Comment: NWB (L) LE, techniques, post op shoe wear     Encounter Problems       Encounter Problems (Active)       Mobility       STG - Patient will ambulate >5' with WW and Casey, maintaining NWB LLE (Progressing)       Start:  05/28/25    Expected End:  06/11/25               PT Transfers        STG - Transfer from bed to chair with Casey and WW (Progressing)       Start:  05/28/25    Expected End:  06/11/25            STG - Patient to transfer to and from sit to supine indep from flat bed, no rails  (Progressing)       Start:  05/28/25    Expected End:  06/11/25            STG - Patient will transfer sit to and from stand with Casey and SOPHIE (Progressing)       Start:  05/28/25    Expected End:  06/11/25               Pain - Adult              06/03/25 at 11:26 AM - Lynne Quintanilla, PT

## 2025-06-03 NOTE — PROGRESS NOTES
Assessment/Plan   Nuris Squires is a 59 y.o. 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 Imaging concerning for gas formation. Started on vanc/zosyn/clinda, underwent L 4th and 5th metatarsal amputation and washout  on 5/25, pending cultures. Post-operatively with some bleeding and anemia requiring reinforcement of wound dressing and transfusion. Post-operative she had urinary retention and severe VENKAT on CKD. Adhikari was able to be removed. Nephrology consulted, suspected due to retention and possible abx mediated. She is recommended mod intensity therapy on discharge.       #VENKAT on CKD, stage 3  - creatinine peaked and is now downtrending.   - nephrology consulted  - suspected due to retention and possible abx mediated  - adhikari removed  -Switch the enoxaparin to heparin 7500 subcutaneous q 8 hours  -Daily RFP, avoid nephrotoxins, renally dose medicaitons   - hold gabapentin     #Urinary retention S/p surgery vs Med induced (Oxy).   -Adhikari removed 6/1 PM, requested bladder scan q6 hours, Resumed tamsulosin  0.8 mg daily      #Osteomyelitis of L foot, gas forming SSTI L Foot  #acute on chronic anemia  - postop c/b bleeding needing transfusion, urinary retention  - s/p left foot 4th and 5th digit amputation, scheduled for Left foot 5/25:  Delayed Primary Closure, Bone cortex Excision, Reverse Sural Flap 5/27/25.   -Per ID team: Discontinued vancomycin and discontinue Zosyn. Started Augmentin 500 mg daily (renal dosing) 2-4 weeks.   -PT/OT, mobilize pt as tolerated, NWB to LLE.   -Post op shoe to the left foot   -Reports  pain is controlled.  -Wound care instruction requested: betadine soaked Adaptic, 4 x 4's ABD pad, kerlix, ace wrap. [place] [facility] [place]t is already scheduled    DMII  - glucose controlled today  - on lispro SSI  - plan to resume home insulin and low ssi  "dosage    HFpEF  HTN  - euvolemic  - continue carvedilol, imdur, torsemide.      -Dispo  SNF       Discussed with nursing, therapy, care coordination        Scheduled outpatient appointments in system:   Future Appointments   Date Time Provider Department [facility]   7/10/2025  3:00 PM Amanda Joe, [facility] [id number] [facility]   7/21/2025  1:15 PM Josue Stanton MD DOW[facility]URO None   8/25/2025 10:30 AM Mone Aquino MD [id number] [facility]   5/15/2026 11:20 AM Marylin Sparks MD MPH [id number] [facility]     ---------------------------------------------------------------------------------------------------  Subjective   No events. Up to chair, worked with therapy this am. Still with some problems managing nonweight bearing and walking. Pain is controlled. No dizziness, cp, sob, gi/luts. Discussed plans for abx, wound care, and following renal function.      ---------------------------------------------------------------------------------------------------  Objective   Last Recorded Vitals  Blood pressure 161/68, pulse 72, temperature 36.6 °C (97.9 °F), resp. rate 15, height 1.575 m (5' 2\"), weight 102 kg (225 lb), SpO2 96%.  Intake/Output last 3 Shifts:  I/O last 3 completed shifts:  In: - (0 mL/kg)   Out: 2100 (20.6 mL/kg) [Urine:2100 (0.6 mL/kg/hr)]  Weight: 102.1 kg     Physical Exam  Vitals and nursing note reviewed.   Constitutional:       General: She is not in acute distress.     Appearance: Normal appearance. She is obese. She is not ill-appearing.   HENT:      Head: Normocephalic and atraumatic.      Mouth/Throat:      Mouth: Mucous membranes are moist.   Eyes:      General: No scleral icterus.     Extraocular Movements: Extraocular movements intact.      Conjunctiva/sclera: Conjunctivae normal.   Cardiovascular:      Rate and Rhythm: Normal rate and regular rhythm.      Heart sounds: S1 normal and S2 normal. No murmur heard.  Pulmonary:      Effort: Pulmonary effort is normal. No respiratory distress.      " Breath sounds: No wheezing, rhonchi or rales.   Abdominal:      General: Bowel sounds are normal. There is no distension.      Palpations: Abdomen is soft.      Tenderness: There is no abdominal tenderness. There is no guarding or rebound.   Musculoskeletal:         General: Deformity present. No swelling.      Cervical back: Neck supple.      Right lower leg: Edema present.      Left lower leg: Edema present.      Comments: L foot TMA, dressed   Skin:     General: Skin is warm and dry.      Findings: No rash.   Neurological:      General: No focal deficit present.      Mental Status: She is alert and oriented to person, place, and time. Mental status is at baseline.      Sensory: No sensory deficit.      Motor: No weakness.   Psychiatric:         Mood and Affect: Mood normal.         Behavior: Behavior normal.         Relevant Results  Lab Results   Component Value Date    WBC 7.9 06/03/2025    HGB 8.1 (L) 06/03/2025    HCT 28.6 (L) 06/03/2025     (H) 06/03/2025     06/03/2025      Lab Results   Component Value Date    GLUCOSE 171 (H) 06/03/2025    CALCIUM 8.3 (L) 06/03/2025     06/03/2025    K 4.2 06/03/2025    CO2 20 (L) 06/03/2025     (H) 06/03/2025    BUN 55 (H) 06/03/2025    CREATININE 5.98 (H) 06/03/2025     Scheduled medications  Scheduled Medications[1]  Continuous medications  Continuous Medications[2]  PRN medications  PRN Medications[3]    Carlo Guzman MD           [1] amoxicillin-clavulanate, 1 tablet, oral, q24h  atorvastatin, 40 mg, oral, Nightly  carvedilol, 12.5 mg, oral, BID  [Held by provider] gabapentin, 300 mg, oral, BID  heparin (porcine), 7,500 Units, subcutaneous, q8h  hydrALAZINE, 50 mg, oral, TID  insulin lispro, 0-15 Units, subcutaneous, TID AC  isosorbide mononitrate ER, 30 mg, oral, Daily  lidocaine, 5 mL, infiltration, Once  nystatin, 1 Application, Topical, BID  pantoprazole, 40 mg, oral, Daily before breakfast  polyethylene glycol, 17 g, oral,  Daily  tamsulosin, 0.8 mg, oral, Daily  torsemide, 40 mg, oral, Daily  vitamin B complex-vitamin C-folic acid, 1 capsule, oral, Daily  [2]    [3] PRN medications: acetaminophen, alum-mag hydroxide-simeth, dextrose, dextrose, glucagon, glucagon, loperamide

## 2025-06-03 NOTE — CARE PLAN
The patient's goals for the shift include Pt would like to sit up to the side of the bed.    The clinical goals for the shift include Pt to be free from falls      Problem: Pain - Adult  Goal: Verbalizes/displays adequate comfort level or baseline comfort level  Outcome: Progressing     Problem: Safety - Adult  Goal: Free from fall injury  Outcome: Progressing     Problem: Discharge Planning  Goal: Discharge to home or other facility with appropriate resources  Outcome: Progressing     Problem: Chronic Conditions and Co-morbidities  Goal: Patient's chronic conditions and co-morbidity symptoms are monitored and maintained or improved  Outcome: Progressing     Problem: Nutrition  Goal: Nutrient intake appropriate for maintaining nutritional needs  Outcome: Progressing     Problem: Diabetes  Goal: Achieve decreasing blood glucose levels by end of shift  Outcome: Progressing  Goal: Increase stability of blood glucose readings by end of shift  Outcome: Progressing  Goal: Decrease in ketones present in urine by end of shift  Outcome: Progressing  Goal: Maintain electrolyte levels within acceptable range throughout shift  Outcome: Progressing  Goal: Maintain glucose levels >70mg/dl to <250mg/dl throughout shift  Outcome: Progressing  Goal: No changes in neurological exam by end of shift  Outcome: Progressing  Goal: Learn about and adhere to nutrition recommendations by end of shift  Outcome: Progressing  Goal: Vital signs within normal range for age by end of shift  Outcome: Progressing  Goal: Increase self care and/or family involovement by end of shift  Outcome: Progressing  Goal: Receive DSME education by end of shift  Outcome: Progressing     Problem: Skin  Goal: Decreased wound size/increased tissue granulation at next dressing change  Outcome: Progressing  Goal: Participates in plan/prevention/treatment measures  Outcome: Progressing  Goal: Prevent/manage excess moisture  Outcome: Progressing  Goal: Prevent/minimize  sheer/friction injuries  Outcome: Progressing  Goal: Promote/optimize nutrition  Outcome: Progressing  Goal: Promote skin healing  Outcome: Progressing     Problem: Fall/Injury  Goal: Not fall by end of shift  Outcome: Progressing  Goal: Be free from injury by end of the shift  Outcome: Progressing  Goal: Verbalize understanding of personal risk factors for fall in the hospital  Outcome: Progressing  Goal: Verbalize understanding of risk factor reduction measures to prevent injury from fall in the home  Outcome: Progressing  Goal: Use assistive devices by end of the shift  Outcome: Progressing  Goal: Pace activities to prevent fatigue by end of the shift  Outcome: Progressing

## 2025-06-04 LAB
ALBUMIN SERPL BCP-MCNC: 2.7 G/DL (ref 3.4–5)
ANION GAP SERPL CALC-SCNC: 15 MMOL/L (ref 10–20)
BUN SERPL-MCNC: 57 MG/DL (ref 6–23)
CALCIUM SERPL-MCNC: 8.1 MG/DL (ref 8.6–10.6)
CHLORIDE SERPL-SCNC: 108 MMOL/L (ref 98–107)
CO2 SERPL-SCNC: 21 MMOL/L (ref 21–32)
CREAT SERPL-MCNC: 5.75 MG/DL (ref 0.5–1.05)
EGFRCR SERPLBLD CKD-EPI 2021: 8 ML/MIN/1.73M*2
ERYTHROCYTE [DISTWIDTH] IN BLOOD BY AUTOMATED COUNT: 14.6 % (ref 11.5–14.5)
GLUCOSE BLD MANUAL STRIP-MCNC: 162 MG/DL (ref 74–99)
GLUCOSE BLD MANUAL STRIP-MCNC: 207 MG/DL (ref 74–99)
GLUCOSE BLD MANUAL STRIP-MCNC: 225 MG/DL (ref 74–99)
GLUCOSE BLD MANUAL STRIP-MCNC: 248 MG/DL (ref 74–99)
GLUCOSE SERPL-MCNC: 168 MG/DL (ref 74–99)
HCT VFR BLD AUTO: 25.8 % (ref 36–46)
HGB BLD-MCNC: 7.8 G/DL (ref 12–16)
MCH RBC QN AUTO: 30.5 PG (ref 26–34)
MCHC RBC AUTO-ENTMCNC: 30.2 G/DL (ref 32–36)
MCV RBC AUTO: 101 FL (ref 80–100)
NRBC BLD-RTO: 0 /100 WBCS (ref 0–0)
PHOSPHATE SERPL-MCNC: 5.7 MG/DL (ref 2.5–4.9)
PLATELET # BLD AUTO: 406 X10*3/UL (ref 150–450)
POTASSIUM SERPL-SCNC: 4.2 MMOL/L (ref 3.5–5.3)
RBC # BLD AUTO: 2.56 X10*6/UL (ref 4–5.2)
SODIUM SERPL-SCNC: 140 MMOL/L (ref 136–145)
WBC # BLD AUTO: 8.1 X10*3/UL (ref 4.4–11.3)

## 2025-06-04 PROCEDURE — 2500000001 HC RX 250 WO HCPCS SELF ADMINISTERED DRUGS (ALT 637 FOR MEDICARE OP)

## 2025-06-04 PROCEDURE — 2500000004 HC RX 250 GENERAL PHARMACY W/ HCPCS (ALT 636 FOR OP/ED): Performed by: STUDENT IN AN ORGANIZED HEALTH CARE EDUCATION/TRAINING PROGRAM

## 2025-06-04 PROCEDURE — 1100000001 HC PRIVATE ROOM DAILY

## 2025-06-04 PROCEDURE — 2500000002 HC RX 250 W HCPCS SELF ADMINISTERED DRUGS (ALT 637 FOR MEDICARE OP, ALT 636 FOR OP/ED): Performed by: STUDENT IN AN ORGANIZED HEALTH CARE EDUCATION/TRAINING PROGRAM

## 2025-06-04 PROCEDURE — 99232 SBSQ HOSP IP/OBS MODERATE 35: CPT | Performed by: STUDENT IN AN ORGANIZED HEALTH CARE EDUCATION/TRAINING PROGRAM

## 2025-06-04 PROCEDURE — 82310 ASSAY OF CALCIUM: CPT

## 2025-06-04 PROCEDURE — 82947 ASSAY GLUCOSE BLOOD QUANT: CPT

## 2025-06-04 PROCEDURE — 2500000001 HC RX 250 WO HCPCS SELF ADMINISTERED DRUGS (ALT 637 FOR MEDICARE OP): Performed by: STUDENT IN AN ORGANIZED HEALTH CARE EDUCATION/TRAINING PROGRAM

## 2025-06-04 PROCEDURE — 36415 COLL VENOUS BLD VENIPUNCTURE: CPT

## 2025-06-04 PROCEDURE — G0545 PR INHERENT VISIT TO INPT: HCPCS | Performed by: STUDENT IN AN ORGANIZED HEALTH CARE EDUCATION/TRAINING PROGRAM

## 2025-06-04 PROCEDURE — 87493 C DIFF AMPLIFIED PROBE: CPT | Performed by: STUDENT IN AN ORGANIZED HEALTH CARE EDUCATION/TRAINING PROGRAM

## 2025-06-04 PROCEDURE — 99233 SBSQ HOSP IP/OBS HIGH 50: CPT | Performed by: STUDENT IN AN ORGANIZED HEALTH CARE EDUCATION/TRAINING PROGRAM

## 2025-06-04 PROCEDURE — 85027 COMPLETE CBC AUTOMATED: CPT

## 2025-06-04 RX ORDER — LINEZOLID 600 MG/1
600 TABLET, FILM COATED ORAL EVERY 12 HOURS SCHEDULED
Status: DISCONTINUED | OUTPATIENT
Start: 2025-06-04 | End: 2025-06-05 | Stop reason: HOSPADM

## 2025-06-04 RX ORDER — FLUCONAZOLE 2 MG/ML
200 INJECTION, SOLUTION INTRAVENOUS EVERY 24 HOURS
Status: DISCONTINUED | OUTPATIENT
Start: 2025-06-04 | End: 2025-06-05 | Stop reason: HOSPADM

## 2025-06-04 RX ADMIN — ASCORBIC ACID, THIAMINE MONONITRATE,RIBOFLAVIN, NIACINAMIDE, PYRIDOXINE HYDROCHLORIDE, FOLIC ACID, CYANOCOBALAMIN, BIOTIN, CALCIUM PANTOTHENATE, 1 CAPSULE: 100; 1.5; 1.7; 20; 10; 1; 6000; 150000; 5 CAPSULE, LIQUID FILLED ORAL at 08:55

## 2025-06-04 RX ADMIN — HYDRALAZINE HYDROCHLORIDE 50 MG: 50 TABLET ORAL at 20:36

## 2025-06-04 RX ADMIN — HEPARIN SODIUM 7500 UNITS: 5000 INJECTION INTRAVENOUS; SUBCUTANEOUS at 04:07

## 2025-06-04 RX ADMIN — CARVEDILOL 12.5 MG: 12.5 TABLET, FILM COATED ORAL at 08:55

## 2025-06-04 RX ADMIN — LINEZOLID 600 MG: 600 TABLET, FILM COATED ORAL at 15:41

## 2025-06-04 RX ADMIN — NYSTATIN 1 APPLICATION: 100000 POWDER TOPICAL at 20:40

## 2025-06-04 RX ADMIN — AMOXICILLIN AND CLAVULANATE POTASSIUM 1 TABLET: 500; 125 TABLET, FILM COATED ORAL at 12:57

## 2025-06-04 RX ADMIN — HEPARIN SODIUM 7500 UNITS: 5000 INJECTION INTRAVENOUS; SUBCUTANEOUS at 12:57

## 2025-06-04 RX ADMIN — TAMSULOSIN HYDROCHLORIDE 0.8 MG: 0.4 CAPSULE ORAL at 08:55

## 2025-06-04 RX ADMIN — ATORVASTATIN CALCIUM 40 MG: 40 TABLET, FILM COATED ORAL at 20:36

## 2025-06-04 RX ADMIN — TORSEMIDE 40 MG: 20 TABLET ORAL at 08:55

## 2025-06-04 RX ADMIN — NYSTATIN 1 APPLICATION: 100000 POWDER TOPICAL at 08:57

## 2025-06-04 RX ADMIN — FLUCONAZOLE 200 MG: 2 INJECTION, SOLUTION INTRAVENOUS at 15:41

## 2025-06-04 RX ADMIN — HEPARIN SODIUM 7500 UNITS: 5000 INJECTION INTRAVENOUS; SUBCUTANEOUS at 20:39

## 2025-06-04 RX ADMIN — PANTOPRAZOLE SODIUM 40 MG: 40 TABLET, DELAYED RELEASE ORAL at 05:36

## 2025-06-04 RX ADMIN — INSULIN LISPRO 3 UNITS: 100 INJECTION, SOLUTION INTRAVENOUS; SUBCUTANEOUS at 09:29

## 2025-06-04 RX ADMIN — LINEZOLID 600 MG: 600 TABLET, FILM COATED ORAL at 20:36

## 2025-06-04 RX ADMIN — INSULIN LISPRO 6 UNITS: 100 INJECTION, SOLUTION INTRAVENOUS; SUBCUTANEOUS at 18:06

## 2025-06-04 RX ADMIN — ISOSORBIDE MONONITRATE 30 MG: 30 TABLET, EXTENDED RELEASE ORAL at 08:55

## 2025-06-04 RX ADMIN — HYDRALAZINE HYDROCHLORIDE 50 MG: 50 TABLET ORAL at 14:36

## 2025-06-04 RX ADMIN — HYDRALAZINE HYDROCHLORIDE 50 MG: 50 TABLET ORAL at 08:55

## 2025-06-04 RX ADMIN — CARVEDILOL 12.5 MG: 12.5 TABLET, FILM COATED ORAL at 20:36

## 2025-06-04 ASSESSMENT — PAIN SCALES - GENERAL: PAINLEVEL_OUTOF10: 0 - NO PAIN

## 2025-06-04 ASSESSMENT — COGNITIVE AND FUNCTIONAL STATUS - GENERAL
DAILY ACTIVITIY SCORE: 16
HELP NEEDED FOR BATHING: A LOT
DRESSING REGULAR UPPER BODY CLOTHING: A LITTLE
PERSONAL GROOMING: A LITTLE
MOVING TO AND FROM BED TO CHAIR: A LOT
MOBILITY SCORE: 13
DRESSING REGULAR UPPER BODY CLOTHING: A LITTLE
TOILETING: A LOT
TOILETING: A LITTLE
WALKING IN HOSPITAL ROOM: A LITTLE
WALKING IN HOSPITAL ROOM: A LOT
DRESSING REGULAR LOWER BODY CLOTHING: A LITTLE
CLIMB 3 TO 5 STEPS WITH RAILING: A LITTLE
DAILY ACTIVITIY SCORE: 20
MOVING FROM LYING ON BACK TO SITTING ON SIDE OF FLAT BED WITH BEDRAILS: A LITTLE
CLIMB 3 TO 5 STEPS WITH RAILING: A LOT
MOBILITY SCORE: 19
HELP NEEDED FOR BATHING: A LITTLE
TURNING FROM BACK TO SIDE WHILE IN FLAT BAD: A LOT
DRESSING REGULAR LOWER BODY CLOTHING: A LOT
TURNING FROM BACK TO SIDE WHILE IN FLAT BAD: A LITTLE
STANDING UP FROM CHAIR USING ARMS: A LOT
MOVING TO AND FROM BED TO CHAIR: A LITTLE
STANDING UP FROM CHAIR USING ARMS: A LITTLE

## 2025-06-04 ASSESSMENT — ENCOUNTER SYMPTOMS
FEVER: 0
CONFUSION: 0
COUGH: 0
ABDOMINAL PAIN: 0
CHILLS: 0
DIAPHORESIS: 0
DIARRHEA: 1
SHORTNESS OF BREATH: 0

## 2025-06-04 NOTE — PROGRESS NOTES
"Nuris Squires is a 59 y.o. female on day 11 of admission presenting with Left foot infection.    Inpatient consult to Infectious Diseases  Consult performed by: Paramjit Rojas MD  Consult ordered by: Carlo Guzman MD        Subjective   Interval History:  the team asked us to review her case since her culture from second foot surgery was erraneously labelled as intra abdominal fluid culture and later corrected and it ended up growing VRE and Candida.      Hx of uncontrolled DM( A1c 8~12)  with neuropathy , PAD, stage 4 CKD ( Cr 2.7~4) ,  HFpEF,  and Charcot foot. .  She had right foot 1st hallux partial resection and left foot 1st partial and 2nd toe amputation. Then developed Group B streptococcus osteomyelitis which was treated with ceftriaxone in January 2024.   She also had hx of VRE bacteriuria .  She was sent to podiatry by her PCP for possible left foot infection with several weeks of ulcer.   Xray on 5/23 showed evidence of osteomyelitis on 4th phalanx and metatarsal head and marked soft tissue swelling with possible gas.  Subsequently sent to hospital  on 5/24 and had TMA on 5/25 . Op note didn't mention any purulence or evidence of necrotizing soft tissue.  Initially put on vancomycin piperacillin-tazobactam and clindamycin.  ( Clindamycin was stopped soon).  She had second debridement and  delayed primary closure on 5/27.  Culture from 5/23 grew Proteus mirabilis and MSSA.   Proteus was resistant to TMP-SMX and cefazolin.  ID saw him and recommended switching antibiotic from vancomycin /piperacillin-tazobactam to PO amoxicillin-clavulanic acid for 2  ~4 weeks depending his clinical progression.  His kidney function has been declining and nephro has been monitoring it.   Podiatry has been content with his recovery from the surgery and ok to discharge him.  Today , the team received a culture report that grew VRE faecium and Candida albicans from the sample  as labeled as f \" intra abdominal fluid " "abscess\" collected on 5/30 ,and confirmed that it was actually from intra op sample from 5/27.  According to OR note on 5/27 , post-lavage culture was taken from the open TMA site passed from the surgical field and was sent to microbiology .   She is feeling fine with improved pain on the foot ,  but reports that she has been having watery diarrhea about 3 times a day .           Review of Systems   Constitutional:  Negative for chills, diaphoresis and fever.   Respiratory:  Negative for cough and shortness of breath.    Gastrointestinal:  Positive for diarrhea. Negative for abdominal pain.   Skin:  Negative for rash.   Psychiatric/Behavioral:  Negative for confusion.        Objective   Range of Vitals (last 24 hours)  Heart Rate:  [68-84]   Temp:  [36.4 °C (97.5 °F)-36.9 °C (98.4 °F)]   Resp:  [16-18]   BP: (143-154)/(56-90)   SpO2:  [93 %-96 %]   Daily Weight  05/24/25 : 102 kg (225 lb)    Body mass index is 41.15 kg/m².    Physical Exam  Vitals reviewed.   Constitutional:       General: She is not in acute distress.     Appearance: She is not ill-appearing.   Eyes:      Conjunctiva/sclera: Conjunctivae normal.   Cardiovascular:      Rate and Rhythm: Normal rate and regular rhythm.      Pulses: Normal pulses.      Heart sounds: Normal heart sounds.   Pulmonary:      Effort: Pulmonary effort is normal.      Breath sounds: Normal breath sounds.   Abdominal:      General: Abdomen is flat.      Palpations: Abdomen is soft.   Musculoskeletal:         General: No swelling.      Cervical back: Neck supple.   Feet:      Comments: Well healed surgical wound on left foot without swelling or redness or discharge .  Faint DP  Skin:     General: Skin is warm.   Neurological:      General: No focal deficit present.      Mental Status: She is alert.   Psychiatric:         Mood and Affect: Mood normal.         Antibiotics  amoxicillin-clavulanate - 500-125 mg  linezolid - 600 mg    Relevant Results  Labs  Results from last 72 " hours   Lab Units 06/04/25 0528 06/03/25 0604 06/02/25  1115   WBC AUTO x10*3/uL 8.1 7.9 8.2   HEMOGLOBIN g/dL 7.8* 8.1* 8.8*   HEMATOCRIT % 25.8* 28.6* 29.0*   PLATELETS AUTO x10*3/uL 406 381 398   NEUTROS PCT AUTO %  --  63.0 64.8   LYMPHS PCT AUTO %  --  25.5 24.2   MONOS PCT AUTO %  --  7.1 7.0   EOS PCT AUTO %  --  3.4 2.9     Results from last 72 hours   Lab Units 06/04/25 0528 06/03/25 0604 06/02/25  1115   SODIUM mmol/L 140 139 137   POTASSIUM mmol/L 4.2 4.2 4.2   CHLORIDE mmol/L 108* 108* 107   CO2 mmol/L 21 20* 20*   BUN mg/dL 57* 55* 62*   CREATININE mg/dL 5.75* 5.98* 6.23*   GLUCOSE mg/dL 168* 171* 249*   CALCIUM mg/dL 8.1* 8.3* 8.5*   ANION GAP mmol/L 15 15 14   EGFR mL/min/1.73m*2 8* 8* 7*   PHOSPHORUS mg/dL 5.7* 6.2* 6.7*     Results from last 72 hours   Lab Units 06/04/25 0528 06/03/25 0604 06/02/25  1115   ALBUMIN g/dL 2.7* 2.7* 2.8*     Estimated Creatinine Clearance: 11.6 mL/min (A) (by C-G formula based on SCr of 5.75 mg/dL (H)).  C-REACTIVE PROTEIN   Date Value Ref Range Status   02/05/2025 6.7 <8.0 mg/L Final     C-Reactive Protein   Date Value Ref Range Status   05/24/2025 2.21 (H) <1.00 mg/dL Final   02/05/2024 1.29 (H) <1.00 mg/dL Final     Microbiology  Susceptibility data from last 14 days.  Collected Specimen Info Organism Amoxicillin/Clavulanate Ampicillin Ampicillin/Sulbactam Cefazolin Ceftriaxone Ciprofloxacin Clindamycin Daptomycin Erythromycin Gentamicin Gentamicin Synergy   05/30/25 Fluid from Intra-abdominal Abscess Enterococcus faecium   R       SDD    S     Candida albicans              05/25/25 Swab from SOFT TISSUE RESECTION Methicillin Susceptible Staphylococcus aureus (MSSA)        S   S       Proteus mirabilis  S  R  S  I  S  R     S      Mixed Skin Microorganisms                 Collected Specimen Info Organism Levofloxacin Linezolid Oxacillin Penicillin Piperacillin/Tazobactam Tetracycline Trimethoprim/Sulfamethoxazole Vancomycin   05/30/25 Fluid from Intra-abdominal  Abscess Enterococcus faecium   S   R    R  R     Candida albicans           05/25/25 Swab from SOFT TISSUE RESECTION Methicillin Susceptible Staphylococcus aureus (MSSA)    S    S  S  S     Proteus mirabilis  R     S  R  R      Mixed Skin Microorganisms                Temp Readings from Last 5 Encounters:   06/04/25 36.4 °C (97.5 °F)   05/20/25 36.2 °C (97.1 °F) (Temporal)   05/13/25 36.6 °C (97.9 °F) (Axillary)   05/07/25 36.4 °C (97.6 °F) (Temporal)   04/28/25 36.3 °C (97.4 °F) (Temporal)       Estimated Creatinine Clearance: 11.6 mL/min (A) (by C-G formula based on SCr of 5.75 mg/dL (H)).      Surgery and Procedure history   1/12/24  Amputation Digit Foot, Right   4/16/24  Open Reduction Internal Fixation Femur .  Insertion Intramedullary Nail Femur   3/10/25   REPAIR, FRACTURE NONUNION, FEMUR   5/25/25   AMPUTATION, FOOT, TRANSMETATARSAL   5/27/25   DEBRIDEMENT, LOWER EXTREMITY  /     SECONDARY CLOSURE SURG WOUND/DEHSN XTNSV/COMP          Antibiotic history   Clindamycin 5/24- 5/26  Amoxicillin-clavulanic acid 5/30 -  Piperacillin-tazobactam 5/24-5/29  Vancomycin 5/24-5/26        INFECTIOUS DISEASE SYNOPSIS AND ASSESSMENT  60yo female with hx of uncontrolled DM with advanced CKD . Multiple amputations of toes for diabetic foot infection presented with acute osteomyelitis with surrounding cellulitis on left foot requiring Transmetatarsal amputation with second surgery for delayed closure .  Tissue Culture from the first surgery grew MSSA. And proteus .  Then fluid culture from second surgery grew VRE and Candida albicans .  Probably the primary infectious organism will be the MSSA and Proteus since it was from the tissue and before being on broad spectrum antibiotic and second surgery culture is secondary after antibiotic pressure.   Nonetheless, we should cover all four pathogens for 2 weeks or more.   Candida albicans is universally susceptive to fluconazole .   We can use oral linezolid with excellent  "bioavailability and no need for renal dosing but would not recommend more than 2 weeks duration due to significant risk of myelosuppression.         Diabetic foot infection with acute osteomyelitis s/p TMA  Stage 4 CKD with CrCL 11.6        RECOMMENDATION  Continue po amoxicillin-clavulanic acid 500mg once a day for 2 weeks starting from today   Add oral linezolid 600mg twice a day   Add oral fluconazole 200mg once a day  (adjust for renal function as appropriate)  Duration of all 4 antibiotic : 2 weeks from today ( end date : 6/18/25)  Prior ID note suggested potential for extension to up to 4 weeks after surgery pending Podiatry colleagues' re-evaluation. If felt necessary, would use PO amoxicillin-clavulanate monotherapy alone for that indication    The case was discussed with my attending , Dr. Kehinde Martinez  who agreed with my assessment and plan.    ROSE MARY RICHARDSON M.D /  Infectious disease consult team A  Infectious disease fellow   Please try to reach me through [employer] for any questions.    +++++++++++++++++++++++  I saw and evaluated the patient. I personally obtained the key and critical portions of the history and physical exam or was physically present for key and critical portions performed by the resident/fellow. I reviewed the resident/fellow's documentation and discussed the patient with the resident/fellow. I agree with the resident/fellow's medical decision making as documented in the note.    Primary team confirmed with the lab that the culture dated 5/30 as \"Sterile Fluid Culture\" from an \"intra-abdominal abscess\" was in fact from this patient. Will confirm this as well. Bony source control obtained.    Kehinde Martinez MD      "

## 2025-06-04 NOTE — PROGRESS NOTES
Assessment/Plan   Nuris Squires is a 59 y.o. 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 Imaging concerning for gas formation. Started on vanc/zosyn/clinda, underwent L 4th and 5th metatarsal amputation and washout  on 5/25, pending cultures. Post-operatively with some bleeding and anemia requiring reinforcement of wound dressing and transfusion. Post-operative she had urinary retention and severe VENKAT on CKD. Adhikari was able to be removed. Nephrology consulted, suspected due to retention and possible abx mediated. She is recommended mod intensity therapy on discharge.     #VENKAT on CKD, stage 3  - creatinine peaked and is now downtrending.   - nephrology consulted  - suspected due to retention and possible abx mediated  - adhikari removed  -Switch the enoxaparin to heparin 7500 subcutaneous q 8 hours  -Daily RFP, avoid nephrotoxins, renally dose medicaitons   - hold gabapentin  - cr is downtrending slowly to 5. 75, bun is at 57.   - overall on review there has been progression of her CKD over the last year, this is a recurrent VENKAT. She is showing slow improvement daily in bun/cr, not requiring HD at this time. Had discussion with pt about her CKD and potential need for Hd in the future, need to follow closely with a nephrologist after discharge.      #Urinary retention S/p surgery vs Med induced (Oxy).   -Adhikari removed 6/1 PM, requested bladder scan q6 hours, Resumed tamsulosin  0.8 mg daily   - monitoring I/o, RFP daily     #Osteomyelitis of L foot, gas forming SSTI L Foot  #acute on chronic anemia  - postop c/b bleeding needing transfusion, urinary retention  - s/p left foot 4th and 5th digit amputation, scheduled for Left foot 5/25:  Delayed Primary Closure, Bone cortex Excision, Reverse Sural Flap 5/27/25.   -Per ID team: Discontinued vancomycin and discontinue Zosyn. Started Augmentin 500 mg daily  (renal dosing) 2-4 weeks.   -PT/OT, mobilize pt as tolerated, NWB to LLE. -Post op shoe to the left foot. WBAT to RLE.   -Reports  pain is controlled.  -Wound care instruction requested: betadine soaked Adaptic, 4 x 4's ABD pad, kerlix, ace wrap. Pt will follow with Dr. Uribe at [facility] on Monday, appt is already scheduled. Will need rescheduling as she is still hospitalized.       DMII  - glucose controlled today  - on lispro SSI  - plan to resume home insulin and low ssi dosage  - glcuose controlled, no changes to insulin regimen.     HFpEF  HTN  - euvolemic  - continue carvedilol, imdur, torsemide, no change      -Dispo  SNF, discussed with TCC and to initiate precert.  Discussed care with nursing. Discussed with family, to update care plan.     Currently discussing culture result with micro lab. Pt has culture posted for intraabdominal abscess with growth of candida and VRE. Suspect this is an error. Pt's last procedure was on the 27th, the intraabdominal labeled culture of abscess is growthing different bacteria than previously isolated, there is no note of a culture take from the foot on the 30th. At this time suspect an error, working with micro to figure it out, otherwise will be discussing with ID further.       Scheduled outpatient appointments in system:   Future Appointments   Date Time Provider Department Center   7/10/2025  3:00 PM Amanda Joe, [facility] [id number] [facility]   7/21/2025  1:15 PM Josue Stanton MD [facility] None   8/25/2025 10:30 AM Mone Aquino MD [id number] [facility]   5/15/2026 11:20 AM Marylin Sparks MD MPH [id number] [facility]     ---------------------------------------------------------------------------------------------------  Subjective   No events. Pt feels well, no pain in her foot. She is elevating her leg for the swelling. She denies f/c, reports good intake. Denies dizziness, ha, cp, sob, n/v, diarrhea, luts. She feels she is emptying her bladder now. She reports still  "having diffiuclty with her NWB status mobilizing.      ---------------------------------------------------------------------------------------------------  Objective   Last Recorded Vitals  Blood pressure 143/65, pulse 68, temperature 36.9 °C (98.4 °F), resp. rate 16, height 1.575 m (5' 2\"), weight 102 kg (225 lb), SpO2 95%.  Intake/Output last 3 Shifts:  I/O last 3 completed shifts:  In: 300 (2.9 mL/kg) [P.O.:300]  Out: 1300 (12.7 mL/kg) [Urine:1300 (0.4 mL/kg/hr)]  Weight: 102.1 kg     Physical Exam  Vitals and nursing note reviewed.   Constitutional:       General: She is not in acute distress.     Appearance: Normal appearance. She is obese. She is not ill-appearing.   HENT:      Head: Normocephalic and atraumatic.      Mouth/Throat:      Mouth: Mucous membranes are moist.   Eyes:      General: No scleral icterus.     Extraocular Movements: Extraocular movements intact.      Conjunctiva/sclera: Conjunctivae normal.   Cardiovascular:      Rate and Rhythm: Normal rate and regular rhythm.      Heart sounds: S1 normal and S2 normal. No murmur heard.  Pulmonary:      Effort: Pulmonary effort is normal. No respiratory distress.      Breath sounds: No wheezing, rhonchi or rales.   Abdominal:      General: Bowel sounds are normal. There is no distension.      Palpations: Abdomen is soft.      Tenderness: There is no abdominal tenderness. There is no guarding or rebound.   Musculoskeletal:         General: Deformity present. No swelling.      Cervical back: Neck supple.      Right lower leg: Edema present.      Left lower leg: Edema present.      Comments: L foot TMA, dressed   Skin:     General: Skin is warm and dry.      Findings: No rash.   Neurological:      General: No focal deficit present.      Mental Status: She is alert and oriented to person, place, and time. Mental status is at baseline.      Sensory: No sensory deficit.      Motor: No weakness.   Psychiatric:         Mood and Affect: Mood normal.         " Behavior: Behavior normal.         Relevant Results  Lab Results   Component Value Date    WBC 8.1 06/04/2025    HGB 7.8 (L) 06/04/2025    HCT 25.8 (L) 06/04/2025     (H) 06/04/2025     06/04/2025      Lab Results   Component Value Date    GLUCOSE 168 (H) 06/04/2025    CALCIUM 8.1 (L) 06/04/2025     06/04/2025    K 4.2 06/04/2025    CO2 21 06/04/2025     (H) 06/04/2025    BUN 57 (H) 06/04/2025    CREATININE 5.75 (H) 06/04/2025     Scheduled medications  Scheduled Medications[1]  Continuous medications  Continuous Medications[2]  PRN medications  PRN Medications[3]    Carlo Guzman MD           [1] amoxicillin-clavulanate, 1 tablet, oral, q24h  atorvastatin, 40 mg, oral, Nightly  carvedilol, 12.5 mg, oral, BID  [Held by provider] gabapentin, 300 mg, oral, BID  heparin (porcine), 7,500 Units, subcutaneous, q8h  hydrALAZINE, 50 mg, oral, TID  insulin lispro, 0-15 Units, subcutaneous, TID AC  isosorbide mononitrate ER, 30 mg, oral, Daily  lidocaine, 5 mL, infiltration, Once  nystatin, 1 Application, Topical, BID  pantoprazole, 40 mg, oral, Daily before breakfast  polyethylene glycol, 17 g, oral, Daily  tamsulosin, 0.8 mg, oral, Daily  torsemide, 40 mg, oral, Daily  vitamin B complex-vitamin C-folic acid, 1 capsule, oral, Daily     [2]    [3] PRN medications: acetaminophen, alum-mag hydroxide-simeth, dextrose, dextrose, glucagon, glucagon, loperamide

## 2025-06-04 NOTE — CARE PLAN
The patient's goals for the shift include Pt would like to sit up to the side of the bed.    The clinical goals for the shift include Patient will remain safe and free of falls this shift      Problem: Pain - Adult  Goal: Verbalizes/displays adequate comfort level or baseline comfort level  Outcome: Progressing     Problem: Safety - Adult  Goal: Free from fall injury  Outcome: Progressing     Problem: Discharge Planning  Goal: Discharge to home or other facility with appropriate resources  Outcome: Progressing     Problem: Chronic Conditions and Co-morbidities  Goal: Patient's chronic conditions and co-morbidity symptoms are monitored and maintained or improved  Outcome: Progressing     Problem: Nutrition  Goal: Nutrient intake appropriate for maintaining nutritional needs  Outcome: Progressing     Problem: Diabetes  Goal: Achieve decreasing blood glucose levels by end of shift  Outcome: Progressing  Goal: Increase stability of blood glucose readings by end of shift  Outcome: Progressing  Goal: Decrease in ketones present in urine by end of shift  Outcome: Progressing  Goal: Maintain electrolyte levels within acceptable range throughout shift  Outcome: Progressing  Goal: Maintain glucose levels >70mg/dl to <250mg/dl throughout shift  Outcome: Progressing  Goal: No changes in neurological exam by end of shift  Outcome: Progressing  Goal: Learn about and adhere to nutrition recommendations by end of shift  Outcome: Progressing  Goal: Vital signs within normal range for age by end of shift  Outcome: Progressing  Goal: Increase self care and/or family involovement by end of shift  Outcome: Progressing  Goal: Receive DSME education by end of shift  Outcome: Progressing     Problem: Skin  Goal: Decreased wound size/increased tissue granulation at next dressing change  Outcome: Progressing  Goal: Participates in plan/prevention/treatment measures  Outcome: Progressing  Goal: Prevent/manage excess moisture  Outcome:  Progressing  Goal: Prevent/minimize sheer/friction injuries  Outcome: Progressing  Flowsheets (Taken 6/3/2025 2147)  Prevent/minimize sheer/friction injuries: HOB 30 degrees or less  Goal: Promote/optimize nutrition  Outcome: Progressing  Goal: Promote skin healing  Outcome: Progressing     Problem: Fall/Injury  Goal: Not fall by end of shift  Outcome: Progressing  Goal: Be free from injury by end of the shift  Outcome: Progressing  Goal: Verbalize understanding of personal risk factors for fall in the hospital  Outcome: Progressing  Goal: Verbalize understanding of risk factor reduction measures to prevent injury from fall in the home  Outcome: Progressing  Goal: Use assistive devices by end of the shift  Outcome: Progressing  Goal: Pace activities to prevent fatigue by end of the shift  Outcome: Progressing

## 2025-06-04 NOTE — PROGRESS NOTES
Per TIFFANIE BE 6/6. Direct submit team asked to submit for precert. Facility updated. MD asked to complete [facility].   Update 1334: Received update from direct submit team that auth approved and good through 6/9.   Melissa LUXN, RN  Transitional Care Coordinator (TCC)  [phone]

## 2025-06-04 NOTE — CARE PLAN
The patient's goals for the shift include Pt would like to sit up to the side of the bed.    The clinical goals for the shift include patient will eat meals while out of bed this shift      Problem: Pain - Adult  Goal: Verbalizes/displays adequate comfort level or baseline comfort level  Outcome: Progressing     Problem: Safety - Adult  Goal: Free from fall injury  Outcome: Progressing     Problem: Discharge Planning  Goal: Discharge to home or other facility with appropriate resources  Outcome: Progressing     Problem: Chronic Conditions and Co-morbidities  Goal: Patient's chronic conditions and co-morbidity symptoms are monitored and maintained or improved  Outcome: Progressing     Problem: Nutrition  Goal: Nutrient intake appropriate for maintaining nutritional needs  Outcome: Progressing     Problem: Diabetes  Goal: Achieve decreasing blood glucose levels by end of shift  Outcome: Progressing  Goal: Increase stability of blood glucose readings by end of shift  Outcome: Progressing  Goal: Decrease in ketones present in urine by end of shift  Outcome: Progressing  Goal: Maintain electrolyte levels within acceptable range throughout shift  Outcome: Progressing  Goal: Maintain glucose levels >70mg/dl to <250mg/dl throughout shift  Outcome: Progressing  Goal: No changes in neurological exam by end of shift  Outcome: Progressing  Goal: Learn about and adhere to nutrition recommendations by end of shift  Outcome: Progressing  Goal: Vital signs within normal range for age by end of shift  Outcome: Progressing  Goal: Increase self care and/or family involovement by end of shift  Outcome: Progressing  Goal: Receive DSME education by end of shift  Outcome: Progressing     Problem: Skin  Goal: Decreased wound size/increased tissue granulation at next dressing change  Outcome: Progressing  Flowsheets (Taken 6/4/2025 1127)  Decreased wound size/increased tissue granulation at next dressing change:   Promote sleep for wound  healing   Protective dressings over bony prominences  Goal: Participates in plan/prevention/treatment measures  Outcome: Progressing  Flowsheets (Taken 6/4/2025 1129)  Participates in plan/prevention/treatment measures:   Elevate heels   Increase activity/out of bed for meals  Goal: Prevent/manage excess moisture  Outcome: Progressing  Flowsheets (Taken 6/4/2025 1129)  Prevent/manage excess moisture:   Cleanse incontinence/protect with barrier cream   Monitor for/manage infection if present   Moisturize dry skin  Goal: Prevent/minimize sheer/friction injuries  Outcome: Progressing  Flowsheets (Taken 6/4/2025 1129)  Prevent/minimize sheer/friction injuries:   HOB 30 degrees or less   Increase activity/out of bed for meals  Goal: Promote/optimize nutrition  Outcome: Progressing  Flowsheets (Taken 6/4/2025 1129)  Promote/optimize nutrition:   Consume > 50% meals/supplements   Monitor/record intake including meals   Offer water/supplements/favorite foods  Goal: Promote skin healing  Outcome: Progressing  Flowsheets (Taken 6/4/2025 1129)  Promote skin healing:   Assess skin/pad under line(s)/device(s)   Protective dressings over bony prominences   Rotate device position/do not position patient on device     Problem: Fall/Injury  Goal: Not fall by end of shift  Outcome: Progressing  Goal: Be free from injury by end of the shift  Outcome: Progressing  Goal: Verbalize understanding of personal risk factors for fall in the hospital  Outcome: Progressing  Goal: Verbalize understanding of risk factor reduction measures to prevent injury from fall in the home  Outcome: Progressing  Goal: Use assistive devices by end of the shift  Outcome: Progressing  Goal: Pace activities to prevent fatigue by end of the shift  Outcome: Progressing

## 2025-06-05 VITALS
OXYGEN SATURATION: 95 % | SYSTOLIC BLOOD PRESSURE: 148 MMHG | WEIGHT: 225 LBS | TEMPERATURE: 96.6 F | RESPIRATION RATE: 16 BRPM | DIASTOLIC BLOOD PRESSURE: 62 MMHG | HEIGHT: 62 IN | BODY MASS INDEX: 41.41 KG/M2 | HEART RATE: 69 BPM

## 2025-06-05 PROBLEM — L08.9 LEFT FOOT INFECTION: Status: RESOLVED | Noted: 2025-05-24 | Resolved: 2025-06-05

## 2025-06-05 LAB
ALBUMIN SERPL BCP-MCNC: 2.8 G/DL (ref 3.4–5)
ANION GAP SERPL CALC-SCNC: 15 MMOL/L (ref 10–20)
BUN SERPL-MCNC: 57 MG/DL (ref 6–23)
C DIF TOX TCDA+TCDB STL QL NAA+PROBE: NOT DETECTED
CALCIUM SERPL-MCNC: 8.6 MG/DL (ref 8.6–10.6)
CHLORIDE SERPL-SCNC: 108 MMOL/L (ref 98–107)
CO2 SERPL-SCNC: 20 MMOL/L (ref 21–32)
CREAT SERPL-MCNC: 5.23 MG/DL (ref 0.5–1.05)
EGFRCR SERPLBLD CKD-EPI 2021: 9 ML/MIN/1.73M*2
ERYTHROCYTE [DISTWIDTH] IN BLOOD BY AUTOMATED COUNT: 14.8 % (ref 11.5–14.5)
GLUCOSE BLD MANUAL STRIP-MCNC: 200 MG/DL (ref 74–99)
GLUCOSE BLD MANUAL STRIP-MCNC: 224 MG/DL (ref 74–99)
GLUCOSE BLD MANUAL STRIP-MCNC: 232 MG/DL (ref 74–99)
GLUCOSE SERPL-MCNC: 236 MG/DL (ref 74–99)
HCT VFR BLD AUTO: 28 % (ref 36–46)
HGB BLD-MCNC: 8.1 G/DL (ref 12–16)
LABORATORY COMMENT REPORT: NORMAL
MCH RBC QN AUTO: 30.2 PG (ref 26–34)
MCHC RBC AUTO-ENTMCNC: 28.9 G/DL (ref 32–36)
MCV RBC AUTO: 105 FL (ref 80–100)
NRBC BLD-RTO: 0 /100 WBCS (ref 0–0)
PATH REPORT.FINAL DX SPEC: NORMAL
PATH REPORT.GROSS SPEC: NORMAL
PATH REPORT.RELEVANT HX SPEC: NORMAL
PATH REPORT.TOTAL CANCER: NORMAL
PHOSPHATE SERPL-MCNC: 5.9 MG/DL (ref 2.5–4.9)
PLATELET # BLD AUTO: 420 X10*3/UL (ref 150–450)
POTASSIUM SERPL-SCNC: 4.2 MMOL/L (ref 3.5–5.3)
RBC # BLD AUTO: 2.68 X10*6/UL (ref 4–5.2)
SODIUM SERPL-SCNC: 139 MMOL/L (ref 136–145)
WBC # BLD AUTO: 7.2 X10*3/UL (ref 4.4–11.3)

## 2025-06-05 PROCEDURE — 2500000001 HC RX 250 WO HCPCS SELF ADMINISTERED DRUGS (ALT 637 FOR MEDICARE OP)

## 2025-06-05 PROCEDURE — 2500000001 HC RX 250 WO HCPCS SELF ADMINISTERED DRUGS (ALT 637 FOR MEDICARE OP): Performed by: STUDENT IN AN ORGANIZED HEALTH CARE EDUCATION/TRAINING PROGRAM

## 2025-06-05 PROCEDURE — 2500000002 HC RX 250 W HCPCS SELF ADMINISTERED DRUGS (ALT 637 FOR MEDICARE OP, ALT 636 FOR OP/ED): Performed by: STUDENT IN AN ORGANIZED HEALTH CARE EDUCATION/TRAINING PROGRAM

## 2025-06-05 PROCEDURE — 85027 COMPLETE CBC AUTOMATED: CPT

## 2025-06-05 PROCEDURE — 99239 HOSP IP/OBS DSCHRG MGMT >30: CPT | Performed by: STUDENT IN AN ORGANIZED HEALTH CARE EDUCATION/TRAINING PROGRAM

## 2025-06-05 PROCEDURE — 2500000004 HC RX 250 GENERAL PHARMACY W/ HCPCS (ALT 636 FOR OP/ED): Performed by: STUDENT IN AN ORGANIZED HEALTH CARE EDUCATION/TRAINING PROGRAM

## 2025-06-05 PROCEDURE — 82947 ASSAY GLUCOSE BLOOD QUANT: CPT

## 2025-06-05 PROCEDURE — 84100 ASSAY OF PHOSPHORUS: CPT

## 2025-06-05 PROCEDURE — 36415 COLL VENOUS BLD VENIPUNCTURE: CPT

## 2025-06-05 PROCEDURE — 99232 SBSQ HOSP IP/OBS MODERATE 35: CPT | Performed by: INTERNAL MEDICINE

## 2025-06-05 RX ORDER — INSULIN LISPRO 100 [IU]/ML
INJECTION, SOLUTION INTRAVENOUS; SUBCUTANEOUS
Start: 2025-06-05

## 2025-06-05 RX ORDER — INSULIN GLARGINE 100 [IU]/ML
10 INJECTION, SOLUTION SUBCUTANEOUS EVERY MORNING
Start: 2025-06-05

## 2025-06-05 RX ORDER — LOPERAMIDE HYDROCHLORIDE 2 MG/1
2 CAPSULE ORAL 4 TIMES DAILY PRN
Start: 2025-06-05 | End: 2025-06-15

## 2025-06-05 RX ORDER — FLUCONAZOLE 200 MG/1
200 TABLET ORAL DAILY
Start: 2025-06-05 | End: 2025-06-18

## 2025-06-05 RX ORDER — AMOXICILLIN AND CLAVULANATE POTASSIUM 500; 125 MG/1; MG/1
1 TABLET, FILM COATED ORAL EVERY 24 HOURS
Start: 2025-06-06 | End: 2025-06-18

## 2025-06-05 RX ORDER — LINEZOLID 600 MG/1
600 TABLET, FILM COATED ORAL EVERY 12 HOURS SCHEDULED
Start: 2025-06-05 | End: 2025-06-18

## 2025-06-05 RX ORDER — TAMSULOSIN HYDROCHLORIDE 0.4 MG/1
0.8 CAPSULE ORAL DAILY
Start: 2025-06-06

## 2025-06-05 RX ORDER — FERROUS SULFATE 325(65) MG
65 TABLET ORAL
Start: 2025-06-05

## 2025-06-05 RX ORDER — PANTOPRAZOLE SODIUM 40 MG/1
40 TABLET, DELAYED RELEASE ORAL
Start: 2025-06-06 | End: 2025-06-20

## 2025-06-05 RX ORDER — NYSTATIN 100000 [USP'U]/G
1 POWDER TOPICAL 2 TIMES DAILY
Start: 2025-06-05 | End: 2025-06-15

## 2025-06-05 RX ORDER — ACETAMINOPHEN 325 MG/1
650 TABLET ORAL EVERY 6 HOURS PRN
Start: 2025-06-05

## 2025-06-05 RX ADMIN — TAMSULOSIN HYDROCHLORIDE 0.8 MG: 0.4 CAPSULE ORAL at 08:39

## 2025-06-05 RX ADMIN — CARVEDILOL 12.5 MG: 12.5 TABLET, FILM COATED ORAL at 08:40

## 2025-06-05 RX ADMIN — PANTOPRAZOLE SODIUM 40 MG: 40 TABLET, DELAYED RELEASE ORAL at 06:24

## 2025-06-05 RX ADMIN — INSULIN LISPRO 3 UNITS: 100 INJECTION, SOLUTION INTRAVENOUS; SUBCUTANEOUS at 08:39

## 2025-06-05 RX ADMIN — TORSEMIDE 40 MG: 20 TABLET ORAL at 08:41

## 2025-06-05 RX ADMIN — AMOXICILLIN AND CLAVULANATE POTASSIUM 1 TABLET: 500; 125 TABLET, FILM COATED ORAL at 12:59

## 2025-06-05 RX ADMIN — INSULIN LISPRO 6 UNITS: 100 INJECTION, SOLUTION INTRAVENOUS; SUBCUTANEOUS at 17:31

## 2025-06-05 RX ADMIN — ASCORBIC ACID, THIAMINE MONONITRATE,RIBOFLAVIN, NIACINAMIDE, PYRIDOXINE HYDROCHLORIDE, FOLIC ACID, CYANOCOBALAMIN, BIOTIN, CALCIUM PANTOTHENATE, 1 CAPSULE: 100; 1.5; 1.7; 20; 10; 1; 6000; 150000; 5 CAPSULE, LIQUID FILLED ORAL at 08:40

## 2025-06-05 RX ADMIN — LINEZOLID 600 MG: 600 TABLET, FILM COATED ORAL at 08:39

## 2025-06-05 RX ADMIN — ISOSORBIDE MONONITRATE 30 MG: 30 TABLET, EXTENDED RELEASE ORAL at 08:40

## 2025-06-05 RX ADMIN — HEPARIN SODIUM 7500 UNITS: 5000 INJECTION INTRAVENOUS; SUBCUTANEOUS at 03:50

## 2025-06-05 RX ADMIN — FLUCONAZOLE 200 MG: 2 INJECTION, SOLUTION INTRAVENOUS at 15:03

## 2025-06-05 RX ADMIN — INSULIN LISPRO 6 UNITS: 100 INJECTION, SOLUTION INTRAVENOUS; SUBCUTANEOUS at 12:59

## 2025-06-05 RX ADMIN — HYDRALAZINE HYDROCHLORIDE 50 MG: 50 TABLET ORAL at 08:40

## 2025-06-05 RX ADMIN — HYDRALAZINE HYDROCHLORIDE 50 MG: 50 TABLET ORAL at 15:03

## 2025-06-05 RX ADMIN — HEPARIN SODIUM 7500 UNITS: 5000 INJECTION INTRAVENOUS; SUBCUTANEOUS at 12:59

## 2025-06-05 RX ADMIN — NYSTATIN 1 APPLICATION: 100000 POWDER TOPICAL at 08:45

## 2025-06-05 ASSESSMENT — PAIN SCALES - GENERAL
PAINLEVEL_OUTOF10: 0 - NO PAIN
PAINLEVEL_OUTOF10: 0 - NO PAIN

## 2025-06-05 NOTE — PROGRESS NOTES
Nuris Squires is a 59 y.o. female on day 12 of admission presenting with Left foot infection. Nephrology following for VENKAT on CKD.      Subjective   Patient endorses continued good urine output. Declines any hematuria, dysuria, hesitancy, or frequency. Denies any vaginal discomfort or itching. Denies any flank pain or abdominal pain. She has been eating well and overall feels well aside from continued diarrhea.    Objective    Vitals 24HR  Heart Rate:  [69-72]   Temp:  [35.9 °C (96.6 °F)-36.1 °C (97 °F)]   Resp:  [16]   BP: (142-148)/(62-65)   SpO2:  [93 %-95 %]     Intake/Output last 3 Shifts:  5/30: 1600 Urine Output, No input recorded  5/31: 1500 urine output, no input recorded  Intake/Output Summary (Last 24 hours) at 6/5/2025 1528  Last data filed at 6/4/2025 2100  Gross per 24 hour   Intake 450 ml   Output 900 ml   Net -450 ml     Physical Exam  Constitutional:       General: She is not in acute distress.     Appearance: Normal appearance. She is not toxic-appearing.   HENT:      Head: Normocephalic and atraumatic.      Mouth/Throat:      Mouth: Mucous membranes are moist.   Cardiovascular:      Rate and Rhythm: Normal rate and regular rhythm.      Pulses: Normal pulses.      Heart sounds: Normal heart sounds.   Pulmonary:      Effort: Pulmonary effort is normal.      Breath sounds: Normal breath sounds.   Abdominal:      General: Abdomen is flat.      Palpations: Abdomen is soft.   Musculoskeletal:         General: Swelling present.      Left lower leg: Edema present.   Neurological:      Mental Status: She is alert.       Results from last 72 hours   Lab Units 06/05/25  0537 06/04/25  0528 06/03/25  0604   GLUCOSE mg/dL 236* 168* 171*   SODIUM mmol/L 139 140 139   POTASSIUM mmol/L 4.2 4.2 4.2   CHLORIDE mmol/L 108* 108* 108*   CO2 mmol/L 20* 21 20*   BUN mg/dL 57* 57* 55*   CREATININE mg/dL 5.23* 5.75* 5.98*   ANION GAP mmol/L 15 15 15   EGFR mL/min/1.73m*2 9* 8* 8*   CALCIUM mg/dL 8.6 8.1* 8.3*    PHOSPHORUS mg/dL 5.9* 5.7* 6.2*   ALBUMIN g/dL 2.8* 2.7* 2.7*   WBC AUTO x10*3/uL 7.2 8.1 7.9   HEMOGLOBIN g/dL 8.1* 7.8* 8.1*   HEMATOCRIT % 28.0* 25.8* 28.6*   PLATELETS AUTO x10*3/uL 420 406 381     Renal US 05/30  1. The right kidney is mildly echogenic, which can be seen in the setting of medical renal disease. No hydronephrosis on the right.  2. Extremely limited evaluation of the left kidney due to overlying bowel gas.  3. Small amount of debris layering within the urinary bladder without evidence of urinary bladder wall thickening or pericystic fluid. Findings are nonspecific, but could be seen in the setting of acute cystitis.    Assessment & Plan    Ms. 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 She presented 5/23 from wound clinic per LENO BaezaM for osteomyelitis of L foot status post left 4th and 5th digit metatarsal amputation and washout. Nephrology was consulted for anuric VENKAT on CKD 4, likely 2/2 Vanco associated ATN and urinary retention. Pt now with improving labs and resolution of anuria indicating a resolving VENKAT.     #Non-Oliguric VENKAT on CKD 4- Resolving  - Baseline creatinine: 2.8-2.7 (Cr 4.48 historical peak 1/19/25 admission)   - Admission BUN/ Cr: 34/2.46   - Latest BUN/Cr: 57/5.23 (Cr peak 5/31 7.05) - improving  - Etiology: Likely MAULIK in setting of advanced CKD, Vanco associated ATN and urinary retention (resolved)  - UA showed:  Proteins 2+, RBCs>20, WBC>50, FeNA 2 % suggesting intrinsic Kidney injury  - C3, C4 wnl  - Clinical volume status: Euvolemic  - Electrolytes (Na, K, Ca, Phos) stable  - Acid base status: HAGMA -> NAGMA in setting of VENKAT and CKD     #Drowsiness- Improved  - Held home Meagan and drowsiness is improved    Recommendations:  - No indication for dialysis at this time. Patient with improving Cr and renal function in setting of resumption of urination. Will continue to evaluate daily.   -  Please ensure outpatient follow up with Nephrology. She needs close follow up with nephrology and dialysis education as this is second admission in 1 year making her high risk for progression.  - Avoid nephrotoxins and contrast if possible.   - Please obtain strict Is/Os and daily BMP to trend BUN/Cr and electrolytes  - Continue holding home Gabapentin iso recent drowsiness and VENKAT. Gabapentin may be resumed on discharge dose adjusted for renal function at discretion of primary team if Cr continues to down-trend during admission  - C/w renal dosing for medications for latest eGFR, follow medication trough as appropriate  - Avoid hypotension/rapid fluctuations in Bps (pt currently normotensive with stable hemodynamic status with Carvedilol, Tamsulosin, and Imdur, no indications to hold said medications at this time).    Nikkie Shetty MD MPH  Internal Medicine, PGY1    24 Hour Renal Pager- [phone]  Discussed with attending Nephrologist.

## 2025-06-05 NOTE — NURSING NOTE
Pt discharge to [facility] via [facility]/7 transport, upon leaving pt appeared to be in no acute distress. Pt left with all belongings. Midline removed , patient left with adhikari per MD order. Nurse to nurse report was called to Monica at facility. All questions answered and encouraged.

## 2025-06-05 NOTE — DISCHARGE SUMMARY
Date of Admission: 5/24/2025    Date of Discharge: 6/5/25    Discharge Diagnosis  Left foot SSTI and osteomyelitis, polymicrobial  VENKAT on CKD 3  Diabetes type 2, with infectious and wound complications  Urinary retention    Issues Requiring Follow-Up  Follow up recovery of renal function, adhikari removal     Discharge Meds     Your medication list        PAUSE taking these medications        Instructions Last Dose Given Next Dose Due   gabapentin 300 mg capsule  Wait to take this until your doctor or other care provider tells you to start again.  Until improvement in kidney function, dose will need to be reevaluated based on your kidney function  Commonly known as: Neurontin      Take 1 capsule (300 mg) by mouth 2 times a day.              START taking these medications        Instructions Last Dose Given Next Dose Due   acetaminophen 325 mg tablet  Commonly known as: Tylenol      Take 2 tablets (650 mg) by mouth every 6 hours if needed (pain).       amoxicillin-clavulanate 500-125 mg tablet  Commonly known as: Augmentin  Start taking on: June 6, 2025  Replaces: amoxicillin-clavulanate 875-125 mg tablet      Take 1 tablet by mouth once every 24 hours for 12 days.       fluconazole 200 mg tablet  Commonly known as: Diflucan      Take 1 tablet (200 mg) by mouth once daily for 13 days.       glucagon 3 mg/actuation spray,non-aerosol      Administer 1 spray into affected nostril(s) every 15 minutes if needed (symptomatic low blood sugar <70 and unable to take orals).       linezolid 600 mg tablet  Commonly known as: Zyvox      Take 1 tablet (600 mg) by mouth every 12 hours for 13 days.       loperamide 2 mg capsule  Commonly known as: Imodium      Take 1 capsule (2 mg) by mouth 4 times a day as needed for diarrhea for up to 10 days.       nystatin 100,000 unit/gram powder  Commonly known as: Mycostatin      Apply 1 Application topically 2 times a day for 20 doses.       pantoprazole 40 mg EC tablet  Commonly known as:  ProtoNix  Start taking on: June 6, 2025      Take 1 tablet (40 mg) by mouth once daily in the morning. Take before meals for 14 days. Do not crush, chew, or split. Please reassess need to continue or trial stopping       tamsulosin 0.4 mg 24 hr capsule  Commonly known as: Flomax  Start taking on: June 6, 2025      Take 2 capsules (0.8 mg) by mouth once daily. Do not crush, chew, or split.       vitamin B complex-vitamin C-folic acid 1 mg capsule  Commonly known as: Nephrocaps  Start taking on: June 6, 2025      Take 1 capsule by mouth once daily.              CHANGE how you take these medications        Instructions Last Dose Given Next Dose Due   Basaglar KwikPen U-100 Insulin 100 unit/mL (3 mL) pen  Generic drug: insulin glargine  What changed: how much to take      Inject 10 Units under the skin once daily in the morning. Take as directed per insulin instructions.       insulin lispro 100 unit/mL pen  Commonly known as: Admelog SoloStar U-100 Insulin  What changed: additional instructions      Inject per sliding scale instructions under the skin 3 times a day with meals and bedtime. 1 units for every 50 of glucose >150              CONTINUE taking these medications        Instructions Last Dose Given Next Dose Due   atorvastatin 40 mg tablet  Commonly known as: Lipitor      Take 1 tablet (40 mg) by mouth once daily.       B complex-vitamin C-folic acid 0.8 mg tablet  Commonly known as: Nephro-Duke      Take 1 tablet by mouth once daily.       carvedilol 12.5 mg tablet  Commonly known as: Coreg      Take 1 tablet (12.5 mg) by mouth 2 times a day.       ferrous sulfate 325 mg (65 mg elemental) tablet      Take 1 tablet by mouth once daily with breakfast.       FreeStyle Remington 3 [place] misc  Generic drug: blood-glucose,,cont      Use as instructed to monitor blood glucose.       FreeStyle Remington 3 Sensor device  Generic drug: blood-glucose sensor      Use to monitor blood glucose. Change sensor every 14  "days.       hydrALAZINE 50 mg tablet  Commonly known as: Apresoline      Take 1 tablet (50 mg) by mouth 3 times a day.       isosorbide mononitrate ER 30 mg 24 hr tablet  Commonly known as: Imdur      Take 1 tablet (30 mg) by mouth once daily. Do not crush or chew.       Mounjaro 5 mg/0.5 mL pen injector  Generic drug: tirzepatide      Inject 5 mg under the skin 1 (one) time per week.       OneTouch Delica Plus Lancet 30 gauge misc  Generic drug: lancets      USE TO TEST BLOOD SUGAR AS DIRECTED       OneTouch Ultra Test  Generic drug: blood sugar diagnostic      Use 1 strip BID to check glucose       OneTouch Ultra2 Meter misc  Generic drug: blood-glucose meter      use 1 to 2 times daily       pen needle, diabetic 32 gauge x 5/32\" needle      Use four times a day with insulin use       torsemide 20 mg tablet  Commonly known as: Demadex      Take 2 tablets (40 mg) by mouth once daily.              STOP taking these medications      amoxicillin-clavulanate 875-125 mg tablet  Commonly known as: Augmentin  Replaced by: amoxicillin-clavulanate 500-125 mg tablet        oxyCODONE 5 mg immediate release tablet  Commonly known as: Roxicodone        sodium hypochlorite 0.25 % external solution  Commonly known as: Dakin's HALF-Strength                  Where to Get Your Medications        Information about where to get these medications is not yet available    Ask your nurse or doctor about these medications  acetaminophen 325 mg tablet  amoxicillin-clavulanate 500-125 mg tablet  Basaglar KwikPen U-100 Insulin 100 unit/mL (3 mL) pen  ferrous sulfate 325 mg (65 mg elemental) tablet  fluconazole 200 mg tablet  glucagon 3 mg/actuation spray,non-aerosol  insulin lispro 100 unit/mL pen  linezolid 600 mg tablet  loperamide 2 mg capsule  nystatin 100,000 unit/gram powder  pantoprazole 40 mg EC tablet  tamsulosin 0.4 mg 24 hr capsule  vitamin B complex-vitamin C-folic acid 1 mg capsule         Test Results Pending At " Discharge  Pending Labs       Order Current Status    Surgical Pathology Exam In process    Fungal Culture/Smear Preliminary result    Fungal Culture/Smear Preliminary result            Hospital Course  Nuris Squires is a 59 y.o. 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 Imaging concerning for gas formation. Started on vanc/zosyn/clinda, underwent L 4th and 5th metatarsal amputation and washout on 5/25, pending cultures. Post-operatively with some bleeding and anemia requiring reinforcement of wound dressing and transfusion. Post-operative she had urinary retention and severe VENKAT on CKD. Adhikari was able to be removed. Nephrology consulted, suspected due to retention and possible abx mediated. She is recommended mod intensity therapy on discharge.  Creatine began downtending slowly daily for multiple days in a row, still above her baseline. She developed further urinary retention again requiring adhikari placement.  She had additional cultures result with reistent bactremia from bone that was resection. Discussed with ID and continued new 2 week course of augmentin, linezolid and fluconazole, likely to stop but an extend augmentin per podiatry discression on follow up. Had converstion about risks/benefits of continuing monitoring renal function in the hosptial v. Facility and pt preferred moving forward with rehab whre it can be monitored. Pt dc to snf in stable condition. Advised at least twice weekly bmp to assess renal function recovery, to see nephrology, to avoid removal of adhikari until cr has improved/stabilized and preferable done with urology, given referral to urogyne. Pt will follow up with podiatry for further wound care and dc to snf for post-acute care and therapy.. Discharge plan of care discussed, education and counseling provided involving active problem care plan, warning signs,   risks/benefits of new medications or medication changes, follow-up care and testing.  Patient advised to follow-up with her primary care within 1 week of discharge or the next available for posthospitalization transition of care.  There was verbalized understanding and agreement with discharge care plan.    Pertinent Physical Exam At Time of Discharge  On the day of discharge, No acute distress, interactive, no increased work of breathing, regular rate and rhythm, abdomen soft/nontender, foot dressed with post-op dressing c/d/i    Outpatient Follow-Up  Future Appointments   Date Time Provider Department Center   [facility]10[facility]2025  [id number]   7/21/2025  1:15 PM Josue Stanton MD [facility] None   8/25/2025 10:30 AM Mone Aquino MD [id number] [facility]   5/15/2026 11:20 AM Marylin Sparks MD [facility] [id number]       Pt instructed to take all medications as prescribed.  Keep all follow-up appointments.  Contact their primary care physician with any questions or concerns that arise.  Come to the emergency department with worsening of your symptoms or any other medical emergency. Instructed that any outpatient tests that are ordered for outpatient follow up are meant to expedite outpatient work up and management, and that the results are not followed or managed by the inpatient ordering team and MUST be followed up with the outpatient primary care or outpatient specialist.  Verbal understanding and agreement obtained to order tests for outpatient follow up purposes.       Time spent >30 minutes on discharge management.    Carlo Guzman MD

## 2025-06-05 NOTE — CARE PLAN
The patient's goals for the shift include Pt would like to sit up to the side of the bed.    The clinical goals for the shift include patient will eat meals while out of bed this shift      Problem: Pain - Adult  Goal: Verbalizes/displays adequate comfort level or baseline comfort level  Outcome: Progressing     Problem: Safety - Adult  Goal: Free from fall injury  Outcome: Progressing     Problem: Discharge Planning  Goal: Discharge to home or other facility with appropriate resources  Outcome: Progressing     Problem: Chronic Conditions and Co-morbidities  Goal: Patient's chronic conditions and co-morbidity symptoms are monitored and maintained or improved  Outcome: Progressing     Problem: Nutrition  Goal: Nutrient intake appropriate for maintaining nutritional needs  Outcome: Progressing     Problem: Diabetes  Goal: Achieve decreasing blood glucose levels by end of shift  Outcome: Progressing  Goal: Increase stability of blood glucose readings by end of shift  Outcome: Progressing  Goal: Decrease in ketones present in urine by end of shift  Outcome: Progressing  Goal: Maintain electrolyte levels within acceptable range throughout shift  Outcome: Progressing  Goal: Maintain glucose levels >70mg/dl to <250mg/dl throughout shift  Outcome: Progressing  Goal: No changes in neurological exam by end of shift  Outcome: Progressing  Goal: Learn about and adhere to nutrition recommendations by end of shift  Outcome: Progressing  Goal: Vital signs within normal range for age by end of shift  Outcome: Progressing  Goal: Increase self care and/or family involovement by end of shift  Outcome: Progressing  Goal: Receive DSME education by end of shift  Outcome: Progressing     Problem: Skin  Goal: Decreased wound size/increased tissue granulation at next dressing change  Outcome: Progressing  Goal: Participates in plan/prevention/treatment measures  Outcome: Progressing  Goal: Prevent/manage excess moisture  Outcome:  Progressing  Flowsheets (Taken 6/4/2025 0506)  Prevent/manage excess moisture: Moisturize dry skin  Goal: Prevent/minimize sheer/friction injuries  Outcome: Progressing  Goal: Promote/optimize nutrition  Outcome: Progressing  Goal: Promote skin healing  Outcome: Progressing     Problem: Fall/Injury  Goal: Not fall by end of shift  Outcome: Progressing  Goal: Be free from injury by end of the shift  Outcome: Progressing  Goal: Verbalize understanding of personal risk factors for fall in the hospital  Outcome: Progressing  Goal: Verbalize understanding of risk factor reduction measures to prevent injury from fall in the home  Outcome: Progressing  Goal: Use assistive devices by end of the shift  Outcome: Progressing  Goal: Pace activities to prevent fatigue by end of the shift  Outcome: Progressing

## 2025-06-05 NOTE — CARE PLAN
Problem: Pain - Adult  Goal: Verbalizes/displays adequate comfort level or baseline comfort level  Outcome: Progressing     Problem: Discharge Planning  Goal: Discharge to home or other facility with appropriate resources  Outcome: Progressing   The patient's goals for the shift include Pt would like to sit up to the side of the bed.    The clinical goals for the shift include Safety    Over the shift, the patient did make progress toward the following goals.

## 2025-06-05 NOTE — PROGRESS NOTES
06/05/25 1400   Discharge Planning   Home or Post Acute Services Post acute facilities (Rehab/SNF/etc)   Type of Post Acute Facility Services Skilled nursing   Expected Discharge Disposition SNF   Does the patient need discharge transport arranged? Yes   RoundTrip coordination needed? Yes   Has discharge transport been arranged? Yes   What day is the transport expected? 06/05/25   What time is the transport expected? 1700     Patient to discharge to [facility] today. Transport arranged via [facility] stretcher for 5pm. Report #: [phone]. Patient, MD, Marisa Griffith RN aware. Blue slip delivered to unit secretary.  Melissa LUXN, RN  Transitional Care Coordinator (TCC)  [phone]

## 2025-06-08 LAB
FUNGUS SPEC CULT: NORMAL
FUNGUS SPEC CULT: NORMAL
FUNGUS SPEC FUNGUS STN: NORMAL
FUNGUS SPEC FUNGUS STN: NORMAL

## 2025-06-13 PROCEDURE — RXMED WILLOW AMBULATORY MEDICATION CHARGE

## 2025-06-14 ENCOUNTER — PHARMACY VISIT (OUTPATIENT)
Dept: PHARMACY | Facility: CLINIC | Age: 60
End: 2025-06-14
Payer: MEDICARE

## 2025-06-16 LAB
FUNGUS SPEC CULT: NORMAL
FUNGUS SPEC FUNGUS STN: NORMAL
LABORATORY COMMENT REPORT: NORMAL
PATH REPORT.FINAL DX SPEC: NORMAL
PATH REPORT.GROSS SPEC: NORMAL
PATH REPORT.RELEVANT HX SPEC: NORMAL
PATH REPORT.TOTAL CANCER: NORMAL

## 2025-06-25 ENCOUNTER — APPOINTMENT (OUTPATIENT)
Dept: PODIATRY | Facility: CLINIC | Age: 60
End: 2025-06-25
Payer: COMMERCIAL

## 2025-07-01 DIAGNOSIS — D63.1 ANEMIA DUE TO STAGE 5 CHRONIC KIDNEY DISEASE, NOT ON CHRONIC DIALYSIS: ICD-10-CM

## 2025-07-01 DIAGNOSIS — N18.4 STAGE 4 CHRONIC KIDNEY DISEASE (MULTI): Primary | ICD-10-CM

## 2025-07-01 DIAGNOSIS — N18.5 ANEMIA DUE TO STAGE 5 CHRONIC KIDNEY DISEASE, NOT ON CHRONIC DIALYSIS: ICD-10-CM

## 2025-07-02 ENCOUNTER — APPOINTMENT (OUTPATIENT)
Dept: NEPHROLOGY | Facility: CLINIC | Age: 60
End: 2025-07-02
Payer: COMMERCIAL

## 2025-07-03 ENCOUNTER — OFFICE VISIT (OUTPATIENT)
Dept: OBSTETRICS AND GYNECOLOGY | Facility: CLINIC | Age: 60
End: 2025-07-03
Payer: COMMERCIAL

## 2025-07-03 VITALS
WEIGHT: 208 LBS | HEIGHT: 62 IN | SYSTOLIC BLOOD PRESSURE: 154 MMHG | DIASTOLIC BLOOD PRESSURE: 79 MMHG | BODY MASS INDEX: 38.28 KG/M2

## 2025-07-03 DIAGNOSIS — R33.8 ACUTE URINARY RETENTION: ICD-10-CM

## 2025-07-03 PROCEDURE — 51700 IRRIGATION OF BLADDER: CPT | Performed by: NURSE PRACTITIONER

## 2025-07-03 PROCEDURE — 3008F BODY MASS INDEX DOCD: CPT | Performed by: NURSE PRACTITIONER

## 2025-07-03 PROCEDURE — 99202 OFFICE O/P NEW SF 15 MIN: CPT | Performed by: NURSE PRACTITIONER

## 2025-07-03 PROCEDURE — 3051F HG A1C>EQUAL 7.0%<8.0%: CPT | Performed by: NURSE PRACTITIONER

## 2025-07-03 PROCEDURE — 99212 OFFICE O/P EST SF 10 MIN: CPT | Mod: 25 | Performed by: NURSE PRACTITIONER

## 2025-07-03 PROCEDURE — 3077F SYST BP >= 140 MM HG: CPT | Performed by: NURSE PRACTITIONER

## 2025-07-03 PROCEDURE — 3062F POS MACROALBUMINURIA REV: CPT | Performed by: NURSE PRACTITIONER

## 2025-07-03 PROCEDURE — 3078F DIAST BP <80 MM HG: CPT | Performed by: NURSE PRACTITIONER

## 2025-07-03 ASSESSMENT — PAIN SCALES - GENERAL: PAINLEVEL_OUTOF10: 0-NO PAIN

## 2025-07-03 NOTE — PROGRESS NOTES
Referring Physician: Carlo Guzman MD     General medical background:   - stage 4 CKD, HTN, T2DM, she is staying at a skilled care facility     Obstetric/Gynecologic History:  Nuris Squires has a history of : 5. Para: 5.     Review of Systems     HPI  History:  Urinary Symptoms:   - The patient has a history of urinary retention following surgeries, specifically after toe amputation and femur surgery.  - She is currently experiencing urinary retention and is here to have the Louis catheter removed.  - Pt has stage 4 chronic kidney disease and follows with a nephrologist.  - She is still producing urine and is not on dialysis.  - The patient has had a Louis catheter since  following foot surgery at the end of May.  - She was able to urinate independently before the surgeries.  - She reports a previous PVR of 300 or 400 ml's by U/S when she was at the hospital.   - If she doesn't have a catheter in place, she occasionally leaks urine.       Physical Exam  Constitutional:       General: She is not in acute distress.     Appearance: Normal appearance. She is not ill-appearing.   Neurological:      Mental Status: She is alert.          Assessment and Plan  59 y.o. female being assessed for urinary retention. She has had a Louis catheter since  following foot surgery at the end of May 2025. Co morbidities: stage 4 CKD, HTN, T2DM.     Diagnoses:   #1 Urinary retention s/p surgery    Plan:   1. Urinary retention s/p surgery   - Trial of void revealed PVR of 150 ml's by U/S. Her PVR is borderline.   - She declines interest in learning to s/c.   - The patient is advised to attempt voiding every three hours and to seek urgent care if unable to void for more than eight hours or if experiencing discomfort.  - We removed the Louis and left it out.      Follow-up in 2 weeks for a repeat trial of void.     Scribe Attestation:   Jazmin BRITTON, am scribing for virtually, and in the presence of Kelsey SOLIMAN  SEDA Simpson on 07/03/2025 at 8:13 PM.     SPEKE audio duration: 25 minutes    I spent a total of {eConsult Time:01664} in face to face and non face to face time.     TAL Gruber-CNP

## 2025-07-05 DIAGNOSIS — E11.3511 TYPE 2 DIABETES MELLITUS WITH RIGHT EYE AFFECTED BY PROLIFERATIVE RETINOPATHY AND MACULAR EDEMA, WITH LONG-TERM CURRENT USE OF INSULIN: ICD-10-CM

## 2025-07-05 DIAGNOSIS — Z79.4 TYPE 2 DIABETES MELLITUS WITH RIGHT EYE AFFECTED BY PROLIFERATIVE RETINOPATHY AND MACULAR EDEMA, WITH LONG-TERM CURRENT USE OF INSULIN: ICD-10-CM

## 2025-07-07 RX ORDER — TIRZEPATIDE 5 MG/.5ML
5 INJECTION, SOLUTION SUBCUTANEOUS WEEKLY
Qty: 2 ML | Refills: 1 | OUTPATIENT
Start: 2025-07-07

## 2025-07-09 ENCOUNTER — PHARMACY VISIT (OUTPATIENT)
Dept: PHARMACY | Facility: CLINIC | Age: 60
End: 2025-07-09
Payer: MEDICARE

## 2025-07-09 DIAGNOSIS — I50.31 ACUTE DIASTOLIC HEART FAILURE: ICD-10-CM

## 2025-07-09 PROCEDURE — RXMED WILLOW AMBULATORY MEDICATION CHARGE

## 2025-07-09 RX ORDER — TORSEMIDE 20 MG/1
40 TABLET ORAL DAILY
Qty: 180 TABLET | Refills: 1 | Status: SHIPPED | OUTPATIENT
Start: 2025-07-09

## 2025-07-10 ENCOUNTER — APPOINTMENT (OUTPATIENT)
Dept: PHARMACY | Facility: HOSPITAL | Age: 60
End: 2025-07-10
Payer: COMMERCIAL

## 2025-07-10 DIAGNOSIS — E78.2 MIXED HYPERLIPIDEMIA: ICD-10-CM

## 2025-07-10 NOTE — PROGRESS NOTES
Clinical Pharmacy Appointment    Patient ID: Nuris Squires is a 59 y.o. female who presents for No chief complaint on file..    Pt is here for Follow Up appointment.  Referring Provider: Mone Aquino MD - last visit: 5/20/25, next visit: 8/25/25  PCP: Mone Aquino MD     Subjective   HPI  PMH significant for T2DM, DFI s/p L 4th and 5th metatarsal amputation (5/25/25), CKD, CHF, PVD, HTN, HLD.  Special needs/barriers to therapy: None identified    Medication Reconciliation:  {Med Rec:77302}    Drug Interactions  The following drug interactions were noted:    Basaglar KwikPen U-100 Insulin, insulin lispro and carvedilol - Diminished response to insulin may occur. Frequency and severity of hypoglycemic episodes may be increased, while warning symptoms of low blood sugar may be masked.    Medication System Management  Adherence/Organization: {Concerns?:56435}  Affordability/Accessibility: {Concerns?:37778}  Patient's preferred pharmacy:     [facility] [id number] [place] [address]  Phone: [phone] Fax: [phone]    [facility]  [id number] [place] [address] [address] [place] [address]  Phone: [phone] Fax: [phone]       DIABETES MELLITUS Type 2:    Follows with Endocrinology?: No    Pertinent PMH Review  Known diabetic complications:   Chronic Kidney Disease  Peripheral vascular disease  Peripheral neuropathy  Obesity  Hx or FH Hx of MTC/MEN2?: No  Pancreatitis?: No  Gastroparesis?: No  UTI/Yeast Infections?: Yes, Hx recurrent yeast infections    Pharmacological Therapy  Current Medications:   Basaglar 10 units once daily  Insulin lispro inject 4 times daily (with meals and at bedtime) per sliding scale (1:50>150)  Mounjaro 5mg once weekly ***  Previous Medications: Metformin, Jardiance (VENKAT)     Clarifications to above regimen: ***  Adverse Effects: ***    Glucose Monitoring  Glucometer/CGM Type:  Freestyle Remington 3 (w/ reader)    Previous home BG readings: (5/15/25) Reported 's-150's typically, highest 220-240  Current home BG readings: ***   Review History --> Average Glucose (last option) --> Use arrows on bottom to change # of days  Review History --> Scroll to next page --> Time in [employer] (2nd option) Use arrows on bottom to change # of days  Average Glucose   7-day 14-day 30-day 90-day   Average *** *** *** ***   Overnight (12am - 6am) *** *** *** ***   Morning (6am - 12pm) *** *** *** ***   Afternoon (12pm - 6pm) *** *** *** ***   Evening (6pm-12am) *** *** *** ***     Time in [employer]   7-day 14-day 30-day 90-day   Above (>180) ***% ***% ***% ***%   In [employer] () ***% ***% ***% ***%   Below (<70) ***% ***% ***% ***%     Any episodes of hypoglycemia? {YES wildcard/NO:60}   Did patient treat episode of hypoglycemia appropriately? {Yes/No/NA:56290}  Does the patient have a prescription for ready-to-use Glucagon? Yes    Lifestyle  Diet: 1 meals/day. ***  Physical Activity: Currently limited by recovery from multiple toe amputation    Risk-Reducing Medications  Statin? Yes  ACE-I/ARB? No, discontinued due to worsening CKD    Preventative Care  Diabetic Eye Exam: Up to date, completed Nov 2024  Monofilament Foot Exam: Following with podiatry  uACR in past year? Yes, 5/29/25. Significant microalbuminuria.  Immunizations Needed: Prevnar, Shingrix, and Tdap  Tobacco Use: non-smoker      Objective   Allergies[1]  Social History     Social History Narrative    Not on file      Medication Review  Current Outpatient Medications   Medication Instructions    acetaminophen (TYLENOL) 650 mg, oral, Every 6 hours PRN    atorvastatin (LIPITOR) 40 mg, oral, Daily    B complex-vitamin C-folic acid (Nephro-Duke) 0.8 mg tablet 1 tablet, oral, Daily    Basaglar KwikPen U-100 Insulin 10 Units, subcutaneous, Every morning, Take as directed per insulin instructions.    blood-glucose sensor (FreeStyle Remington 3 Sensor)  "device Use to monitor blood glucose. Change sensor every 14 days.    carvedilol (COREG) 12.5 mg, oral, 2 times daily    ferrous sulfate 325 mg (65 mg elemental) tablet 1 tablet, oral, Daily with breakfast    FreeStyle Remington 3 [place] misc Use as instructed to monitor blood glucose.    [Paused] gabapentin (NEURONTIN) 300 mg, oral, 2 times daily    glucagon 3 mg/actuation spray,non-aerosol 1 spray, nasal, Every 15 min PRN    hydrALAZINE (APRESOLINE) 50 mg, oral, 3 times daily    insulin lispro (Admelog SoloStar U-100 Insulin) 100 unit/mL pen Inject per sliding scale instructions under the skin 3 times a day with meals and bedtime. 1 units for every 50 of glucose >150    isosorbide mononitrate ER (IMDUR) 30 mg, oral, Daily, Do not crush or chew.    Mounjaro 5 mg, subcutaneous, Weekly    OneTouch Delica Plus Lancet 30 gauge misc USE TO TEST BLOOD SUGAR AS DIRECTED    OneTouch Ultra Test strip Use 1 strip BID to check glucose    OneTouch Ultra2 Meter misc use 1 to 2 times daily    pantoprazole (PROTONIX) 40 mg, oral, Daily before breakfast, Do not crush, chew, or split. Please reassess need to continue or trial stopping    pen needle, diabetic 32 gauge x 5/32\" needle Use four times a day with insulin use    tamsulosin (FLOMAX) 0.8 mg, oral, Daily, Do not crush, chew, or split.    torsemide (DEMADEX) 40 mg, oral, Daily    vitamin B complex-vitamin C-folic acid (Nephrocaps) 1 mg capsule 1 capsule, oral, Daily      Vitals  BP Readings from Last 2 Encounters:   07/03/25 154/79   06/05/25 148/62     Wt Readings from Last 3 Encounters:   07/03/25 94.3 kg (208 lb)   05/24/25 102 kg (225 lb)   05/20/25 103 kg (226 lb 3.2 oz)      There is no height or weight on file to calculate BMI.  Labs  A1C  Lab Results   Component Value Date    HGBA1C 7.3 (A) 05/20/2025    HGBA1C 8.7 (H) 01/02/2025    HGBA1C 9 (A) 11/19/2024     Metabolic Panel  Lab Results   Component Value Date    EGFR 11 (L) 07/02/2025    CREATININE 4.51 (H) 07/02/2025    "  07/02/2025    K 5.2 07/02/2025    CALCIUM 9.1 07/02/2025     07/02/2025    CO2 25 07/02/2025    BUN 93 (HH) 07/02/2025     Liver function  Lab Results   Component Value Date    ALT 81 (H) 07/02/2025    AST 34 07/02/2025    ALKPHOS 95 07/02/2025    BILITOT 0.4 07/02/2025     Lipid Panel  Lab Results   Component Value Date    CHOL 174 05/08/2023    TRIG 189 (H) 05/08/2023    HDL 38.5 (A) 05/08/2023     Urine Microalbumin  Lab Results   Component Value Date    MICROALBCREA 1,471.0 (H) 05/29/2025       Assessment/Plan   Problem List Items Addressed This Visit    None    Patient's A1c is 7.3% on 5/20/25. Goal A1c <7%.  Patient's SMBGs are ***.     Rationale for plan: ***.    Medication Changes:  {Medication Changes Plan:56446}     Patient Education:  {Patient Education:77052}    Clinical Pharmacist follow-up: ***, Telehealth visit  Patient is followed in TCM. I spent *** minutes in the direct care of this patient and *** minutes performing chart review.     Thank you,  [facility]  Clinical Pharmacy Specialist  [phone]    Continue all meds under the continuation of care with the referring provider and clinical pharmacy team.  Verbal consent to manage patient's drug therapy was obtained from the patient. They were informed they may decline to participate or withdraw from participation in pharmacy services at any time.       [1] No Known Allergies

## 2025-07-11 RX ORDER — ATORVASTATIN CALCIUM 40 MG/1
40 TABLET, FILM COATED ORAL DAILY
Qty: 30 TABLET | Refills: 1 | Status: SHIPPED | OUTPATIENT
Start: 2025-07-11 | End: 2025-09-09

## 2025-07-16 DIAGNOSIS — R33.8 ACUTE URINARY RETENTION: ICD-10-CM

## 2025-07-17 ENCOUNTER — OFFICE VISIT (OUTPATIENT)
Dept: OBSTETRICS AND GYNECOLOGY | Facility: CLINIC | Age: 60
End: 2025-07-17
Payer: COMMERCIAL

## 2025-07-17 DIAGNOSIS — R33.8 ACUTE URINARY RETENTION: ICD-10-CM

## 2025-07-17 PROCEDURE — 3051F HG A1C>EQUAL 7.0%<8.0%: CPT | Performed by: NURSE PRACTITIONER

## 2025-07-17 PROCEDURE — 99212 OFFICE O/P EST SF 10 MIN: CPT | Performed by: NURSE PRACTITIONER

## 2025-07-17 NOTE — PROGRESS NOTES
Pt instructed on procedure for voiding trial and questions answered. Instilled normal saline into bladder via adhikari catheter until pt had urge to void at 250 ml. Catheter removed intact, and pt voided 225 ml into hat. Stat lock removed, and pt tolerated entire procedure well. Kelsey Simpson  informed of outcome.

## 2025-07-21 ENCOUNTER — APPOINTMENT (OUTPATIENT)
Age: 60
End: 2025-07-21
Payer: COMMERCIAL

## 2025-07-21 VITALS — HEART RATE: 73 BPM | DIASTOLIC BLOOD PRESSURE: 63 MMHG | SYSTOLIC BLOOD PRESSURE: 107 MMHG

## 2025-07-21 DIAGNOSIS — Z91.89 AT RISK FOR UTI RELATED TO INDWELLING CATHETER: ICD-10-CM

## 2025-07-21 DIAGNOSIS — N39.0 URINARY TRACT INFECTION ASSOCIATED WITH INDWELLING URETHRAL CATHETER, SUBSEQUENT ENCOUNTER: ICD-10-CM

## 2025-07-21 DIAGNOSIS — R39.15 URGENCY OF URINATION: ICD-10-CM

## 2025-07-21 DIAGNOSIS — R33.9 URINARY RETENTION: ICD-10-CM

## 2025-07-21 DIAGNOSIS — T83.511D URINARY TRACT INFECTION ASSOCIATED WITH INDWELLING URETHRAL CATHETER, SUBSEQUENT ENCOUNTER: ICD-10-CM

## 2025-07-21 PROCEDURE — 3078F DIAST BP <80 MM HG: CPT | Performed by: UROLOGY

## 2025-07-21 PROCEDURE — 3051F HG A1C>EQUAL 7.0%<8.0%: CPT | Performed by: UROLOGY

## 2025-07-21 PROCEDURE — 99213 OFFICE O/P EST LOW 20 MIN: CPT | Performed by: UROLOGY

## 2025-07-21 PROCEDURE — 3074F SYST BP LT 130 MM HG: CPT | Performed by: UROLOGY

## 2025-07-21 NOTE — PROGRESS NOTES
UROLOGIC FOLLOW-UP VISIT     PROBLEM LIST:  1. Urinary retention  POCT UA Automated manually resulted    Urinalysis with Reflex Culture and Microscopic    Post-Void Residual      2. Urgency of urination  POCT UA Automated manually resulted    Urinalysis with Reflex Culture and Microscopic    Post-Void Residual      3. At risk for UTI related to indwelling catheter  POCT UA Automated manually resulted    Urinalysis with Reflex Culture and Microscopic    Post-Void Residual      4. Urinary tract infection associated with indwelling urethral catheter, subsequent encounter  POCT UA Automated manually resulted    Urinalysis with Reflex Culture and Microscopic    Post-Void Residual           HISTORY OF PRESENT ILLNESS:   60 y.o. F with DM2 (previously uncontrolled but improving recently with insulin regimen), HFpEF, CKD.  Kindly referred for hospital follow up & further evaluation and management of urinary retention.     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     Patient develops urinary retention and VENKAT on CKD when hospitalized. This has happened a few times. Most recently was admitted in late May/early June for a foot infection s/p L 4/5th metatarsal amputation. During hospitalization she went into urinary retention requiring adhikari placement. Cr significantly elevated though now downtrending. Adhikari was removed outpatient and she passed TOV (no bladder scan but voided out the same volume that was instilled).     Today, states that she is voiding well. Denies all LUTS. No hematuria, no UTI symptoms. No sensation of incomplete emptying.    States that she has daily soft Bms. Off narcotics. Has been ambulating at nursing facility with PT.     PAST MEDICAL HISTORY:  Medical History[1]    PAST SURGICAL HISTORY:  Surgical History[2]     ALLERGIES:   Allergies[3]     MEDICATIONS:   Medications Ordered Prior to Encounter[4]     SOCIAL  HISTORY:  Patient  reports that she has never smoked. She has never been exposed to tobacco smoke. She has never used smokeless tobacco. She reports current alcohol use. She reports that she does not use drugs.   Social History     Socioeconomic History    Marital status: Single     Spouse name: Not on file    Number of children: Not on file    Years of education: Not on file    Highest education level: Not on file   Occupational History    Not on file   Tobacco Use    Smoking status: Never     Passive exposure: Never    Smokeless tobacco: Never   Vaping Use    Vaping status: Never Used   Substance and Sexual Activity    Alcohol use: Yes    Drug use: Never    Sexual activity: Defer   Other Topics Concern    Not on file   Social History Narrative    Not on file     Social Drivers of Health     Financial Resource Strain: Low Risk  (5/27/2025)    Overall Financial Resource Strain (CARDIA)     Difficulty of Paying Living Expenses: Not hard at all   Food Insecurity: Patient Declined (5/24/2025)    Hunger Vital Sign     Worried About Running Out of Food in the Last Year: Patient declined     Ran Out of Food in the Last Year: Patient declined   Transportation Needs: No Transportation Needs (5/27/2025)    PRAPARE - Transportation     Lack of Transportation (Medical): No     Lack of Transportation (Non-Medical): No   Physical Activity: Inactive (5/24/2025)    Exercise Vital Sign     Days of Exercise per Week: 0 days     Minutes of Exercise per Session: 0 min   Stress: Patient Declined (5/24/2025)    [employer] [place] [employer] - Occupational Stress Questionnaire     Feeling of Stress : Patient declined   Social Connections: Patient Declined (5/24/2025)    Social Connection and Isolation Panel     Frequency of Communication with Friends and Family: Patient declined     Frequency of Social Gatherings with Friends and Family: Patient declined     Attends Catholic Services: Patient declined     Active Member of  "Clubs or Organizations: Patient declined     Attends Club or Organization Meetings: Patient declined     Marital Status: Patient declined   Intimate Partner Violence: Patient Declined (5/24/2025)    Humiliation, Afraid, Rape, and Kick questionnaire     Fear of Current or Ex-Partner: Patient declined     Emotionally Abused: Patient declined     Physically Abused: Patient declined     Sexually Abused: Patient declined   Housing Stability: Low Risk  (5/27/2025)    Housing Stability Vital Sign     Unable to Pay for Housing in the Last Year: No     Number of Times Moved in the Last Year: 1     Homeless in the Last Year: No       FAMILY HISTORY:  Family History[5]    REVIEW OF SYSTEMS:  Negative except as reported above    PHYSICAL EXAM:  Visit Vitals  /63   Pulse 73     Constitutional: Well-developed and well-nourished. No distress.  L boot from recent procedure. In wheelchair.   Head: Normocephalic and atraumatic.    Neck: Normal range of motion.     Pulmonary/Chest: Effort normal. No respiratory distress.   Abdominal: Non-distended.  Integumentary: No rash or lesions visualized.  Musculoskeletal: Normal range of motion.    Neurological: Alert and oriented.  Psychiatric: Normal mood and affect. Thought content normal.      LABORATORY REVIEW:   Lab Results   Component Value Date    BUN 93 (HH) 07/02/2025    CREATININE 4.51 (H) 07/02/2025    EGFR 11 (L) 07/02/2025     07/02/2025    K 5.2 07/02/2025     07/02/2025    CO2 25 07/02/2025    CALCIUM 9.1 07/02/2025      Lab Results   Component Value Date    WBC 6.9 07/02/2025    RBC 3.30 (L) 07/02/2025    HGB 10.2 (L) 07/02/2025    HCT 32.2 (L) 07/02/2025    MCV 98 07/02/2025    MCH 30.9 07/02/2025    MCHC 31.7 (L) 07/02/2025    RDW 14.8 (H) 07/02/2025     07/02/2025    MPV 9.6 02/05/2025        No results found for: \"PSA\"        Assessment:      1. Urinary retention  POCT UA Automated manually resulted    Urinalysis with Reflex Culture and Microscopic "    Post-Void Residual      2. Urgency of urination  POCT UA Automated manually resulted    Urinalysis with Reflex Culture and Microscopic    Post-Void Residual      3. At risk for UTI related to indwelling catheter  POCT UA Automated manually resulted    Urinalysis with Reflex Culture and Microscopic    Post-Void Residual      4. Urinary tract infection associated with indwelling urethral catheter, subsequent encounter  POCT UA Automated manually resulted    Urinalysis with Reflex Culture and Microscopic    Post-Void Residual          Nuris Squires is a 60 y.o. with DM2, HFpEF, CKD.  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 Recent hospitalization with retention, VENKAT on CKD. Louis now out and patient voiding normally.   Plan:   - UA with reflex today  - Bladder scan today 91cc (patient could not void but stated she voided 1-1.5hours prior)   - Explained that she is at risk of tipping in retention when experiencing a triggering event such as hospitalization. Explained that she should try to regulate her BMs, ambulate as much as possible, and stay vigilant toward possible signs of retention. She understands.   - Follow up with nephrology as scheduled. She will get another BMP drawn in the next few days.   - RTC 6 months          [1]   Past Medical History:  Diagnosis Date    CKD (chronic kidney disease)     Hyperlipidemia     Hypertension     Hypertensive heart and kidney disease with HF and with CKD stage IV 01/06/2025    Personal history of other diseases of the circulatory system     History of hypertension    Personal history of other endocrine, nutritional and metabolic disease     History of hyperlipidemia    Personal history of other endocrine, nutritional and metabolic disease     History of diabetes mellitus   [2]   Past Surgical History:  Procedure Laterality Date    CARDIAC CATHETERIZATION N/A 1/8/2025    Procedure: Right Heart Cath;  Surgeon: Migel  MD Maximino;  Location: [place] [address] Cardiac Cath Lab;  Service: Cardiovascular;  Laterality: N/A;    CARDIAC CATHETERIZATION N/A 1/17/2025    Procedure: Right Heart Cath;  Surgeon: Ana Lea MD;  Location: [place] [address] Cardiac Cath Lab;  Service: Cardiovascular;  Laterality: N/A;    CT ANGIO NECK  1/25/2023    CT NECK ANGIO W AND WO IV CONTRAST 1/25/2023 DOCTOR OFFICE LEGACY    CT HEAD ANGIO W AND WO IV CONTRAST  1/25/2023    CT HEAD ANGIO W AND WO IV CONTRAST 1/25/2023 DOCTOR OFFICE LEGACY    OTHER SURGICAL HISTORY  10/21/2020    Toe amputation   [3] No Known Allergies  [4]   Current Outpatient Medications on File Prior to Visit   Medication Sig Dispense Refill    acetaminophen (Tylenol) 325 mg tablet Take 2 tablets (650 mg) by mouth every 6 hours if needed (pain).      atorvastatin (Lipitor) 40 mg tablet Take 1 tablet (40 mg) by mouth once daily. 30 tablet 1    B complex-vitamin C-folic acid (Nephro-Duke) 0.8 mg tablet Take 1 tablet by mouth once daily. 90 tablet 0    blood-glucose sensor (FreeStyle Remington 3 Sensor) device Use to monitor blood glucose. Change sensor every 14 days. 2 each 11    carvedilol (Coreg) 12.5 mg tablet Take 1 tablet (12.5 mg) by mouth 2 times a day. 60 tablet 1    ferrous sulfate 325 mg (65 mg elemental) tablet Take 1 tablet by mouth once daily with breakfast.      FreeStyle Remington 3 [place] misc Use as instructed to monitor blood glucose. 1 each 0    [Paused] gabapentin (Neurontin) 300 mg capsule Take 1 capsule (300 mg) by mouth 2 times a day. 180 capsule 3    hydrALAZINE (Apresoline) 50 mg tablet Take 1 tablet (50 mg) by mouth 3 times a day. 270 tablet 3    insulin glargine (Basaglar KwikPen U-100 Insulin) 100 unit/mL (3 mL) pen Inject 10 Units under the skin once daily in the morning. Take as directed per insulin instructions.      insulin lispro (Admelog SoloStar U-100 Insulin) 100 unit/mL pen Inject per sliding scale instructions under the skin 3 times a day with meals and  "bedtime. 1 units for every 50 of glucose >150      isosorbide mononitrate ER (Imdur) 30 mg 24 hr tablet Take 1 tablet (30 mg) by mouth once daily. Do not crush or chew. 30 tablet 3    OneTouch Delica Plus Lancet 30 gauge misc USE TO TEST BLOOD SUGAR AS DIRECTED 200 each 0    OneTouch Ultra Test strip Use 1 strip BID to check glucose 200 strip 1    OneTouch Ultra2 Meter misc use 1 to 2 times daily 1 each 0    pen needle, diabetic 32 gauge x 5/32\" needle Use four times a day with insulin use 360 each 1    tirzepatide (Mounjaro) 5 mg/0.5 mL pen injector Inject 5 mg under the skin 1 (one) time per week. 2 mL 1    torsemide (Demadex) 20 mg tablet Take 2 tablets (40 mg) by mouth once daily. 180 tablet 1    vitamin B complex-vitamin C-folic acid (Nephrocaps) 1 mg capsule Take 1 capsule by mouth once daily.      glucagon 3 mg/actuation spray,non-aerosol Administer 1 spray into affected nostril(s) every 15 minutes if needed (symptomatic low blood sugar <70 and unable to take orals).      pantoprazole (ProtoNix) 40 mg EC tablet Take 1 tablet (40 mg) by mouth once daily in the morning. Take before meals for 14 days. Do not crush, chew, or split. Please reassess need to continue or trial stopping      tamsulosin (Flomax) 0.4 mg 24 hr capsule Take 2 capsules (0.8 mg) by mouth once daily. Do not crush, chew, or split. (Patient not taking: Reported on 7/21/2025)       No current facility-administered medications on file prior to visit.   [5]   Family History  Problem Relation Name Age of Onset    Alzheimer's disease Mother      Diabetes Mother      Lung cancer Mother      Diabetes Father      Lung cancer Father      Pancreatic cancer Father      Diabetes Sister      Lung cancer Sister       "

## 2025-07-29 ENCOUNTER — APPOINTMENT (OUTPATIENT)
Dept: PHARMACY | Facility: HOSPITAL | Age: 60
End: 2025-07-29
Payer: COMMERCIAL

## 2025-07-30 ENCOUNTER — PATIENT OUTREACH (OUTPATIENT)
Dept: PRIMARY CARE | Facility: CLINIC | Age: 60
End: 2025-07-30
Payer: COMMERCIAL

## 2025-07-30 DIAGNOSIS — E11.3511 TYPE 2 DIABETES MELLITUS WITH RIGHT EYE AFFECTED BY PROLIFERATIVE RETINOPATHY AND MACULAR EDEMA, WITH LONG-TERM CURRENT USE OF INSULIN: ICD-10-CM

## 2025-07-30 DIAGNOSIS — Z79.4 TYPE 2 DIABETES MELLITUS WITH RIGHT EYE AFFECTED BY PROLIFERATIVE RETINOPATHY AND MACULAR EDEMA, WITH LONG-TERM CURRENT USE OF INSULIN: ICD-10-CM

## 2025-07-30 RX ORDER — TIRZEPATIDE 5 MG/.5ML
5 INJECTION, SOLUTION SUBCUTANEOUS WEEKLY
Qty: 2 ML | Refills: 1 | Status: SHIPPED | OUTPATIENT
Start: 2025-07-30

## 2025-07-30 NOTE — PROGRESS NOTES
Discharge Facility: [facility] [facility]  Discharge Diagnosis: Left foot SSTI and osteomyelitis, polymicrobial; VENKAT on CKD 3; Diabetes type 2, with infectious and wound complications; Urinary retention   Admission Date: [facility] 5/24/2025 to 6/5/2025  Procedure Dates: 5/25/2025 Amputation left foot, transmetatarsal; 5/27/2025 Debridement LLE  Discharge Date: [facility] 6/5/2025 to 6/29/2025    PCP Appointment Date: Appointment with Mone Aquino MD (08/04/2025)   Specialist Appointment Date: Weekly podiatry scheduled,   Appointment with Barrera Rodriguez MD (07/31/2025) Orthopedic Surgery,  Appointment with SEDA Vincent (07/31/2025) Nephrology  Hospital Encounter and Summary Linked: Yes  ED to Hosp-Admission (Discharged) with Carlo Guzman MD; Rosalva Castaneda MD (05/24/2025)     See discharge assessment below for further details:    Wrap Up  Wrap Up Additional Comments: 60yoFpresented for worsening L foot infection. Imaging concerning for gas formation. Started on vanc/zosyn/clinda, underwent L 4th and 5th metatarsal amputation and washout on 5/25. Patient developed urinary retention and VENKAT on CKD post-op. Urology consulted and patient had a adhikari catheter placed that was later d/c'ed outpatient. Patient discharged to SNF and is now home with [facility]. Per patient, she is feeling much better. She saw podiatry on 7/29 and had her L foot dressing changed. Per podiatry, incison healing well. Patient also fitted for orthotics while at appt. Patient denied any pain in foot. Wound care is ordered every other day and she is independent with dressing changes. Patient has not heard from [facility] yet. Confirmed follow up appt schedule with patient. (7/30/2025  3:28 PM)    Medications  Medications reviewed with patient/caregiver?: No (7/30/2025  3:28 PM)  Is the patient having any side effects they believe may be caused by any medication additions or changes?: No (7/30/2025  3:28 PM)  Does the patient  have all medications ordered at discharge?: No (2 medications should be available from the pharmacy on 7/31.) (7/30/2025  3:28 PM)  Prescription Comments: Unable to view discharge med list from rehab center to reconcile meds. (7/30/2025  3:28 PM)  Medication Comments: Patient stated she has resumed her gabapentin that was paused. (7/30/2025  3:28 PM)    Appointments  Does the patient have a primary care provider?: Yes (7/30/2025  3:28 PM)  Care Management Interventions: Verified appointment date/time/provider (7/30/2025  3:28 PM)  Has the patient kept scheduled appointments due by today?: Not applicable (7/30/2025  3:28 PM)    Self Management  What is the home health agency?: N/A (7/30/2025  3:28 PM)  What Durable Medical Equipment (DME) was ordered?: N/A (7/30/2025  3:28 PM)    Patient Teaching  Does the patient have access to their discharge instructions?: Yes (7/30/2025  3:28 PM)  Care Management Interventions: Reviewed instructions with patient (7/30/2025  3:28 PM)  What is the patient's perception of their health status since discharge?: Improving (7/30/2025  3:28 PM)  Is the patient/caregiver able to teach back the hierarchy of who to call/visit for symptoms/problems? PCP, Specialist, Home Health nurse, Urgent Care, ED, 911: Yes (7/30/2025  3:28 PM)  Patient/Caregiver Education Comments: Patient verbalized understanding of discharge instructions. Provided contact information for nonurgent questions or concerns. (7/30/2025  3:28 PM)

## 2025-07-31 ENCOUNTER — HOSPITAL ENCOUNTER (OUTPATIENT)
Dept: RADIOLOGY | Facility: HOSPITAL | Age: 60
Discharge: HOME | End: 2025-07-31
Payer: COMMERCIAL

## 2025-07-31 ENCOUNTER — OFFICE VISIT (OUTPATIENT)
Dept: ORTHOPEDIC SURGERY | Facility: HOSPITAL | Age: 60
End: 2025-07-31
Payer: COMMERCIAL

## 2025-07-31 ENCOUNTER — APPOINTMENT (OUTPATIENT)
Dept: NEPHROLOGY | Facility: CLINIC | Age: 60
End: 2025-07-31
Payer: COMMERCIAL

## 2025-07-31 VITALS — DIASTOLIC BLOOD PRESSURE: 91 MMHG | HEART RATE: 66 BPM | SYSTOLIC BLOOD PRESSURE: 165 MMHG

## 2025-07-31 DIAGNOSIS — S72.432K CLOSED BICONDYLAR FRACTURE OF DISTAL FEMUR, LEFT, WITH NONUNION, SUBSEQUENT ENCOUNTER: Primary | ICD-10-CM

## 2025-07-31 DIAGNOSIS — S72.432K CLOSED BICONDYLAR FRACTURE OF DISTAL FEMUR, LEFT, WITH NONUNION, SUBSEQUENT ENCOUNTER: ICD-10-CM

## 2025-07-31 DIAGNOSIS — N17.9 AKI (ACUTE KIDNEY INJURY): Primary | ICD-10-CM

## 2025-07-31 DIAGNOSIS — E08.52 DIABETES MELLITUS DUE TO UNDERLYING CONDITION WITH DIABETIC PERIPHERAL ANGIOPATHY AND GANGRENE, WITH LONG-TERM CURRENT USE OF INSULIN: ICD-10-CM

## 2025-07-31 DIAGNOSIS — Z79.4 DIABETES MELLITUS DUE TO UNDERLYING CONDITION WITH DIABETIC PERIPHERAL ANGIOPATHY AND GANGRENE, WITH LONG-TERM CURRENT USE OF INSULIN: ICD-10-CM

## 2025-07-31 DIAGNOSIS — I10 ESSENTIAL HYPERTENSION: ICD-10-CM

## 2025-07-31 DIAGNOSIS — S72.422K CLOSED BICONDYLAR FRACTURE OF DISTAL FEMUR, LEFT, WITH NONUNION, SUBSEQUENT ENCOUNTER: ICD-10-CM

## 2025-07-31 DIAGNOSIS — N18.9 CHRONIC KIDNEY DISEASE, UNSPECIFIED CKD STAGE: ICD-10-CM

## 2025-07-31 DIAGNOSIS — S72.422K CLOSED BICONDYLAR FRACTURE OF DISTAL FEMUR, LEFT, WITH NONUNION, SUBSEQUENT ENCOUNTER: Primary | ICD-10-CM

## 2025-07-31 PROCEDURE — 3077F SYST BP >= 140 MM HG: CPT | Performed by: NURSE PRACTITIONER

## 2025-07-31 PROCEDURE — 3080F DIAST BP >= 90 MM HG: CPT | Performed by: NURSE PRACTITIONER

## 2025-07-31 PROCEDURE — 99214 OFFICE O/P EST MOD 30 MIN: CPT | Performed by: ORTHOPAEDIC SURGERY

## 2025-07-31 PROCEDURE — 3051F HG A1C>EQUAL 7.0%<8.0%: CPT | Performed by: NURSE PRACTITIONER

## 2025-07-31 PROCEDURE — 73552 X-RAY EXAM OF FEMUR 2/>: CPT | Mod: LT

## 2025-07-31 PROCEDURE — 73552 X-RAY EXAM OF FEMUR 2/>: CPT | Mod: LEFT SIDE | Performed by: STUDENT IN AN ORGANIZED HEALTH CARE EDUCATION/TRAINING PROGRAM

## 2025-07-31 PROCEDURE — 73560 X-RAY EXAM OF KNEE 1 OR 2: CPT | Mod: LEFT SIDE | Performed by: STUDENT IN AN ORGANIZED HEALTH CARE EDUCATION/TRAINING PROGRAM

## 2025-07-31 PROCEDURE — 73560 X-RAY EXAM OF KNEE 1 OR 2: CPT | Mod: LT

## 2025-07-31 PROCEDURE — 3051F HG A1C>EQUAL 7.0%<8.0%: CPT | Performed by: ORTHOPAEDIC SURGERY

## 2025-07-31 PROCEDURE — 99212 OFFICE O/P EST SF 10 MIN: CPT | Performed by: ORTHOPAEDIC SURGERY

## 2025-07-31 PROCEDURE — 99215 OFFICE O/P EST HI 40 MIN: CPT | Performed by: NURSE PRACTITIONER

## 2025-07-31 PROCEDURE — 1036F TOBACCO NON-USER: CPT | Performed by: ORTHOPAEDIC SURGERY

## 2025-07-31 RX ORDER — GABAPENTIN 100 MG/1
100 CAPSULE ORAL NIGHTLY
COMMUNITY
Start: 2025-07-30

## 2025-07-31 ASSESSMENT — ENCOUNTER SYMPTOMS
DIZZINESS: 0
APPETITE CHANGE: 0
VOMITING: 0
NAUSEA: 0
DYSURIA: 0
ROS SKIN COMMENTS: NO ITCHING
ACTIVITY CHANGE: 0
SHORTNESS OF BREATH: 0
DIFFICULTY URINATING: 0
LIGHT-HEADEDNESS: 0

## 2025-07-31 ASSESSMENT — PAIN SCALES - GENERAL: PAINLEVEL_OUTOF10: 0-NO PAIN

## 2025-07-31 ASSESSMENT — PATIENT HEALTH QUESTIONNAIRE - PHQ9
2. FEELING DOWN, DEPRESSED OR HOPELESS: NOT AT ALL
1. LITTLE INTEREST OR PLEASURE IN DOING THINGS: SEVERAL DAYS
SUM OF ALL RESPONSES TO PHQ9 QUESTIONS 1 AND 2: 1

## 2025-07-31 NOTE — PROGRESS NOTES
Subjective   Patient ID: Nuris Squires is a 60 y.o. female who presents for Chronic Kidney Disease (Patient here for education ).  HPI  Pt presents for CKD follow up and ESRD modality options education.   PMH CKD stage 4, DM, HTN, HFpEF, HLD, PAD, MO, multiple foot infections, multiple hospital admissions over past 18mths for infections and ADHF frequently with associated VENKAT on CKD. Seen outpt by Dr Brown in Feb. Pt was to follow up in 2-3mths but cancelled appt.  During recent admission 5/24-6/5 pt presented with osteomyelitis s/p left TMA. Nephrology consulted inhouse for VENKAT on CKD presumed due to MAULIK, vanco associated ATN and urinary retention necessitating adhikari. Discharged to rehab with adhikari which was later discontinued by outpt urology. Pt reports good urine output since. Cr 2.46 on day of admission (eGFR 22ml/min), peaked to 7 (eGFR 6ml/min) with improvement to 5.23 (eGFR 9ml/min) on day of discharge (6/5). Follow up labs done on 7/2 with Cr 4.51 and eGFR 11ml/min.   Feeling well overall.   No new symptoms or c/o   Utilizing walker and wheelchair at home with gentle pressure to left foot wearing ortho shoe. Per pt dressing changes every other day   Blood sugars well controlled at home with [id number] this am.   BP checked by home health nurse earlier today consistent with reading here   Recalls pressures lower at facility    Review of Systems   Constitutional:  Negative for activity change and appetite change.        Good appetite  Food tastes ok for most part   Has had a bad taste in her mouth that she is unable to describe for about a month, not constant   Has lost about 20lbs while in rehab. Reports slowly losing wt with mounjaro since Feb     Respiratory:  Negative for shortness of breath.    Cardiovascular:  Negative for chest pain.        No orthopnea or PND    Gastrointestinal:  Negative for nausea and vomiting.   Genitourinary:  Negative for difficulty urinating and dysuria.        Denies symptoms  of UTI or urinary retention   Skin:  Negative for rash.        No itching    Neurological:  Negative for dizziness and light-headedness.   Psychiatric/Behavioral:          Some anxiety since recent hospital admission  Denies need for treatment at this time        Objective   Physical Exam  Constitutional:       General: She is not in acute distress.     Appearance: She is obese. She is not ill-appearing.      Comments: Presents in wheelchair   Appears fatigued   HENT:      Mouth/Throat:      Mouth: Mucous membranes are moist.     Cardiovascular:      Rate and Rhythm: Normal rate and regular rhythm.      Heart sounds: Normal heart sounds.   Pulmonary:      Effort: Pulmonary effort is normal.      Comments: DBS bases  Abdominal:      Comments: Obese abd     Musculoskeletal:      Comments: Walking shoe to left foot   Trace ankle edema bilat     Skin:     General: Skin is warm and dry.     Neurological:      Mental Status: She is alert and oriented to person, place, and time.     Psychiatric:         Mood and Affect: Mood normal.         Behavior: Behavior normal.         Results  Lab Results   Component Value Date    CREATININE 4.51 (H) 07/02/2025    BUN 93 (HH) 07/02/2025     07/02/2025    K 5.2 07/02/2025     07/02/2025    CO2 25 07/02/2025      Lab Results   Component Value Date    EGFR 11 (L) 07/02/2025     BP (!) 165/91 (BP Location: Left arm, Patient Position: Sitting, BP Cuff Size: Large adult)   Pulse 66   ESRD Modality Options Education   Met with pt to discuss RRT modalities: PD, iHD, Home HD  Extensive discussion of the pros and cons of each modality.  Discussed next steps involved in planning for HD: vein sparing, vein mapping, vascular consult, surgery for access placement and CDC decision.  Discussed next steps of PD: PD nephrologist consult, PD consult with nurses, dietician and SW, surgery consult for PD catheter placement, home visit. Living situation:   lives in house  but soon moving to  apt. Previous abd surgery, no   Discussed indications for initiating RRT.    Deferred transplant education for now d/t pt fatigue, processing great deal of information at once  Reinforced blood pressure control and/or blood sugar control   Brief review of dietary recs: low sodium diet, avoidance of red meat, avoidance of processed foods. Referral to dietician for guidance  Given internet sites to review: [email or web address].org and [email or web address]  Shared contact info for this provider.  At end of session pt leaning towards PD     Assessment/Plan   Diagnoses and all orders for this visit:  VENKAT (acute kidney injury) on   Chronic kidney disease due to DM  VENKAT on CKD 4 during recent hospital admission for severe foot infection with digit amputations then TMA d/t MAULIK, vanc associated ATN and urinary retention with Cr 2.46 to 7 . Some recovery prior to discharge to rehab with Cr to 5.23 though no labs noted until 7/2. Slight improvement in kidney function with Cr 4.51 but eGFR remained low at 11ml/min (from 22ml/min on day of admission.) Pt with several episodes of VENKAT in past year increasing risk for rapid progression  No obvious uremic symptoms other than improving fatigue which is likely multifactorial in nature.  Pt to repeat labs tomorrow.  ESRD modality options education today. Pt leaning towards PD but unclear where kidney function is today. Will discuss further once repeat labs available.  Pt to move from house to apt in near future which could impact storage options.   Needs follow up appt. Once labs completed tomorrow will set up appt as needed   Essential hypertension  BP elevated today. Reports good control at rehab and at recent appt on 7/21 with urology (BP <110/sys)   Higher BP today may reflect nature of appt (scheduled as Modality options education)  No changes in meds today. Has BP cuff at home; enc to monitor 1-2 times daily going forward  Has PCP appt next week and can compare BP with home cuff  Reinforced low  sodium diet and medication adherence  Diabetes mellitus due to underlying condition with diabetic peripheral angiopathy and gangrene, with long-term current use of insulin  Pt reports good control of blood sugars with CGM       TAL Vincent-CNP 07/31/25 3:07 PM

## 2025-07-31 NOTE — PROGRESS NOTES
Nuris Squires is  post-op from intramedullary nail and ORIF left distal femur fracture on 4/16/2024. Status post fracture nonunion repair on 3/10/2025. she is doing well at this point.  Pain is well controlled  Denies fevers or chills.  Denies drainage from the wound.  she reports no additional symptoms or concerns. No shortness of breath or chest pain. No calf swelling or pain.    The patients full medical history, surgical history, medications, allergies, family, medical history, social history, and a complete 14 point review of systems is documented in the medical record on the signed, scanned medical intake sheet or reviewed in the history of present illness. 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 Sig Documenting Provider Last Dose Status   acetaminophen (Tylenol) 325 mg tablet [id number]  Take 2 tablets (650 mg) by mouth every 6 hours if needed (pain). Carlo Guzman MD  Active   atorvastatin (Lipitor) 40 mg tablet [id number]  Take 1 tablet (40 mg) by mouth once daily. Mone Aquino MD  Active   B complex-vitamin C-folic acid (Nephro-[facility]) 0.8 mg tablet [id number] No Take 1 tablet by mouth once daily. Mone Aquino MD 3/9/2025 Active   blood-glucose sensor (FreeStyle Remington 3 Sensor) device [id number] No Use to monitor blood glucose. Change sensor every 14 days. Mone Aquino MD  3/9/2025 Active   carvedilol (Coreg) 12.5 mg tablet [id number] No Take 1 tablet (12.5 mg) by mouth 2 times a day. Betty NavarrorickSEDA 3/10/2025 Morning  25 235   ferrous sulfate 325 mg (65 mg elemental) tablet [id number]  Take 1 tablet by mouth once daily with breakfast. Carlo Guzman MD  Active   FreeStyle Remington 3 [place] misc [id number] No Use as instructed to monitor blood glucose. Mone Aquino MD 3/9/2025 Active   gabapentin (Neurontin) 300 mg capsule [id number] Yes Take 1 capsule (300 mg) by mouth 2 times a day. Mone Aquino MD 3/9/2025 Active   glucagon 3 mg/actuation spray,non-aerosol [id number]  Administer 1 spray into affected nostril(s) every 15 minutes if needed (symptomatic low blood sugar <70 and unable to take orals). Carlo Guzman MD   25 235   hydrALAZINE (Apresoline) 50 mg tablet [id number]  Take 1 tablet (50 mg) by mouth 3 times a day. Marylin Sparks MD MPH  Active   insulin glargine (Basaglar KwikPen U-100 Insulin) 100 unit/mL (3 mL) pen [id number]  Inject 10 Units under the skin once daily in the morning. Take as directed per insulin instructions. Carlo Guzman MD  Active   insulin lispro (Admelog SoloStar U-100 Insulin) 100 unit/mL pen [id number]  Inject per sliding scale instructions under the skin 3 times a day with meals and bedtime. 1 units for every 50 of glucose >150 Carlo Guzman MD  Active   isosorbide mononitrate ER (Imdur) 30 mg 24 hr tablet [id number]  Take 1 tablet (30 mg) by mouth once daily. Do not crush or chew. Mone Aquino MD  Active   OneTouch Delica Plus Lancet 30 gauge misc [id number] No USE TO TEST BLOOD SUGAR AS DIRECTED Mone Aquino MD 3/9/2025 Active   OneTouch Ultra Test strip [id number] No Use 1 strip BID to check glucose Mone Aquino MD 3/9/2025 Active   OneTouch Ultra2 Meter misc [id number] No use 1 to 2 times daily Mone Aquino MD 3/9/2025 Active   pantoprazole (ProtoNix) 40 mg EC tablet [id number]   "Take 1 tablet (40 mg) by mouth once daily in the morning. Take before meals for 14 days. Do not crush, chew, or split. Please reassess need to continue or trial stopping Carlo Guzman MD   25 8184   pen needle, diabetic 32 gauge x \" needle [id number] No Use four times a day with insulin use Mone Aquino MD 3/9/2025 Active   tamsulosin (Flomax) 0.4 mg 24 hr capsule [id number]  Take 2 capsules (0.8 mg) by mouth once daily. Do not crush, chew, or split.   Patient not taking: Reported on 2025    Carlo Guzman MD  Active   tirzepatide (Mounjaro) 5 mg/0.5 mL pen injector [id number]  Inject 5 mg under the skin 1 (one) time per week. Mone Aquino MD  Active   torsemide (Demadex) 20 mg tablet [id number]  Take 2 tablets (40 mg) by mouth once daily. Marylin Sparks MD [facility]  Active   vitamin B complex-vitamin C-folic acid (Nephrocaps) 1 mg capsule [id number]  Take 1 capsule by mouth once daily. Carlo Guzman MD  Active                    No Known Allergies    Social History     Socioeconomic History    Marital status: Single     Spouse name: Not on file    Number of children: Not on file    Years of education: Not on file    Highest education level: Not on file   Occupational History    Not on file   Tobacco Use    Smoking status: Never     Passive exposure: Never    Smokeless tobacco: Never   Vaping Use    Vaping status: Never Used   Substance and Sexual Activity    Alcohol use: Yes    Drug use: Never    Sexual activity: Defer   Other Topics Concern    Not on file   Social History Narrative    Not on file     Social Drivers of Health     Financial Resource Strain: Low Risk  (2025)    Overall Financial Resource Strain (CARDIA)     Difficulty of Paying Living Expenses: Not hard at all   Food Insecurity: Patient Declined (2025)    Hunger Vital Sign     Worried About Running Out of Food in the Last Year: Patient declined     Ran Out of Food in the Last Year: Patient declined "   Transportation Needs: No Transportation Needs (5/27/2025)    PRAPARE - Transportation     Lack of Transportation (Medical): No     Lack of Transportation (Non-Medical): No   Physical Activity: Inactive (5/24/2025)    Exercise Vital Sign     Days of Exercise per Week: 0 days     Minutes of Exercise per Session: 0 min   Stress: Patient Declined (5/24/2025)    [facility] - Occupational Stress Questionnaire     Feeling of Stress : Patient declined   Social Connections: Patient Declined (5/24/2025)    Social Connection and Isolation Panel     Frequency of Communication with Friends and Family: Patient declined     Frequency of Social Gatherings with Friends and Family: Patient declined     Attends Jew Services: Patient declined     Active Member of Clubs or Organizations: Patient declined     Attends Club or Organization Meetings: Patient declined     Marital Status: Patient declined   Intimate Partner Violence: Patient Declined (5/24/2025)    Humiliation, Afraid, Rape, and Kick questionnaire     Fear of Current or Ex-Partner: Patient declined     Emotionally Abused: Patient declined     Physically Abused: Patient declined     Sexually Abused: Patient declined   Housing Stability: Low Risk  (5/27/2025)    Housing Stability Vital Sign     Unable to Pay for Housing in the Last Year: No     Number of Times Moved in the Last Year: 1     Homeless in the Last Year: No       Past Surgical History:   Procedure Laterality Date    CARDIAC CATHETERIZATION N/A 1/8/2025    Procedure: Right Heart Cath;  Surgeon: Migel Stanton MD;  Location: [place] [address] Cardiac Cath [facility];  Service: Cardiovascular;  Laterality: N/A;    CARDIAC CATHETERIZATION N/A 1/17/2025    Procedure: Right Heart Cath;  Surgeon: Ana Lea MD;  Location: [place] [address] Cardiac Cath Lab;  Service: Cardiovascular;  Laterality: N/A;    CT ANGIO NECK  1/25/2023    CT NECK ANGIO W AND WO IV CONTRAST 1/25/2023 [facility]ICE  [facility]    CT HEAD ANGIO W AND WO IV CONTRAST  1/25/2023    CT HEAD ANGIO W AND WO IV CONTRAST 1/25/2023 DOCTOR OFFICE [facility]    OTHER SURGICAL HISTORY  10/21/2020    Toe amputation       Gen: The patient is alert and oriented ×3, is in no acute distress, and appear their stated age and weight.    Psychiatric: Mood and affect are appropriate.    Eyes: Sclera are white, and pupils are round and symmetric.    ENT: Mucous membranes are moist.     Neck: Supple. Thyroid is midline.    Respiratory: Respirations are nonlabored, chest rise is symmetric.    Cardiac: Rate is regular by palpation of distal pulses.     Abdomen: Nondistended.    Integument: No obvious cutaneous lesions are noted. No signs of lymphangitis. No signs of systemic edema.  side: left lower extremity :  her  surgical incisions are healing well, without evidence of erythema, fluctuance, drainage, or infection.  The skin around the incision is intact.  Distally neurovascular exam is stable.  There is appropriate tenderness to palpation in the selene-incisional area. No calf swelling or tenderness to palpation.      I personally reviewed multiple views of left knee and femur were obtained in the office today demonstrate maintenance of reduction, interval healing, and a stable position of the hardware.  Additionally she has a lot of callus on the lateral side but not as much on the medial side concerning for nonunion.      Nuris Squires is a 60 y.o. female patient status post intramedullary nail and ORIF left distal femur fracture on 4/16/2024.  Status post fracture nonunion repair on 3/10/2025.  I went over her x-rays in detail today.   she is WBAT of the side: left lower extremity. ~He/she~ is range of motion as tolerated of the side: left lower extremity.  I stressed the importance of physical therapy on overall functional outcome.  To work on range of motion.  She is making more callus which is really good.  Unfortunately a little bit of a setback where  she had to get her toes amputated.  She is still able to ambulate.  She is to continue her vitamin D and calcium.  I answered all patient's questions he agrees with treatment plan.  I will see her back in Follow-up in 3 months  with 2 views of the left knee and 2 views left femur    Contact number [phone]    Barrera Rodriguez    Department of Orthopaedic Trauma Surgery

## 2025-08-01 ENCOUNTER — LAB (OUTPATIENT)
Dept: LAB | Facility: HOSPITAL | Age: 60
End: 2025-08-01
Payer: COMMERCIAL

## 2025-08-01 DIAGNOSIS — N18.5 CHRONIC KIDNEY DISEASE, STAGE 5 (MULTI): ICD-10-CM

## 2025-08-01 DIAGNOSIS — D63.1 ANEMIA IN CHRONIC KIDNEY DISEASE (CODE): ICD-10-CM

## 2025-08-01 DIAGNOSIS — N18.4 CHRONIC KIDNEY DISEASE, STAGE 4 (SEVERE) (MULTI): Primary | ICD-10-CM

## 2025-08-01 LAB
ALBUMIN SERPL BCP-MCNC: 3.7 G/DL (ref 3.4–5)
ANION GAP SERPL CALC-SCNC: 16 MMOL/L (ref 10–20)
BUN SERPL-MCNC: 119 MG/DL (ref 6–23)
CALCIUM SERPL-MCNC: 8.6 MG/DL (ref 8.6–10.3)
CHLORIDE SERPL-SCNC: 114 MMOL/L (ref 98–107)
CO2 SERPL-SCNC: 16 MMOL/L (ref 21–32)
CREAT SERPL-MCNC: 5.17 MG/DL (ref 0.5–1.05)
EGFRCR SERPLBLD CKD-EPI 2021: 9 ML/MIN/1.73M*2
ERYTHROCYTE [DISTWIDTH] IN BLOOD BY AUTOMATED COUNT: 14.6 % (ref 11.5–14.5)
FERRITIN SERPL-MCNC: 391 NG/ML (ref 8–150)
GLUCOSE SERPL-MCNC: 101 MG/DL (ref 74–99)
HBV SURFACE AB SER-ACNC: <3.1 MIU/ML
HBV SURFACE AG SERPL QL IA: NONREACTIVE
HCT VFR BLD AUTO: 30 % (ref 36–46)
HGB BLD-MCNC: 9.5 G/DL (ref 12–16)
IRON SATN MFR SERPL: 43 % (ref 25–45)
IRON SERPL-MCNC: 88 UG/DL (ref 35–150)
MCH RBC QN AUTO: 30.2 PG (ref 26–34)
MCHC RBC AUTO-ENTMCNC: 31.7 G/DL (ref 32–36)
MCV RBC AUTO: 95 FL (ref 80–100)
NRBC BLD-RTO: 0 /100 WBCS (ref 0–0)
PHOSPHATE SERPL-MCNC: 4.8 MG/DL (ref 2.5–4.9)
PLATELET # BLD AUTO: 207 X10*3/UL (ref 150–450)
POTASSIUM SERPL-SCNC: 4.6 MMOL/L (ref 3.5–5.3)
RBC # BLD AUTO: 3.15 X10*6/UL (ref 4–5.2)
SODIUM SERPL-SCNC: 141 MMOL/L (ref 136–145)
TIBC SERPL-MCNC: 206 UG/DL (ref 240–445)
UIBC SERPL-MCNC: 118 UG/DL (ref 110–370)
WBC # BLD AUTO: 6.5 X10*3/UL (ref 4.4–11.3)

## 2025-08-01 PROCEDURE — 83540 ASSAY OF IRON: CPT

## 2025-08-01 PROCEDURE — 85027 COMPLETE CBC AUTOMATED: CPT

## 2025-08-01 PROCEDURE — 87340 HEPATITIS B SURFACE AG IA: CPT

## 2025-08-01 PROCEDURE — 83550 IRON BINDING TEST: CPT

## 2025-08-01 PROCEDURE — 83970 ASSAY OF PARATHORMONE: CPT

## 2025-08-01 PROCEDURE — 82728 ASSAY OF FERRITIN: CPT

## 2025-08-01 PROCEDURE — 80069 RENAL FUNCTION PANEL: CPT

## 2025-08-01 PROCEDURE — 86706 HEP B SURFACE ANTIBODY: CPT

## 2025-08-01 PROCEDURE — 36415 COLL VENOUS BLD VENIPUNCTURE: CPT

## 2025-08-02 ENCOUNTER — TELEPHONE (OUTPATIENT)
Dept: NEPHROLOGY | Facility: HOSPITAL | Age: 60
End: 2025-08-02
Payer: COMMERCIAL

## 2025-08-02 LAB — PTH-INTACT SERPL-MCNC: 183.7 PG/ML (ref 18.5–88)

## 2025-08-04 ENCOUNTER — HOSPITAL ENCOUNTER (INPATIENT)
Facility: HOSPITAL | Age: 60
LOS: 4 days | Discharge: HOME | End: 2025-08-08
Attending: EMERGENCY MEDICINE | Admitting: STUDENT IN AN ORGANIZED HEALTH CARE EDUCATION/TRAINING PROGRAM
Payer: COMMERCIAL

## 2025-08-04 ENCOUNTER — APPOINTMENT (OUTPATIENT)
Dept: PRIMARY CARE | Facility: CLINIC | Age: 60
End: 2025-08-04
Payer: COMMERCIAL

## 2025-08-04 ENCOUNTER — CLINICAL SUPPORT (OUTPATIENT)
Dept: EMERGENCY MEDICINE | Facility: HOSPITAL | Age: 60
End: 2025-08-04
Payer: COMMERCIAL

## 2025-08-04 DIAGNOSIS — R26.9 ABNORMAL GAIT: ICD-10-CM

## 2025-08-04 DIAGNOSIS — K59.09 CHRONIC CONSTIPATION: ICD-10-CM

## 2025-08-04 DIAGNOSIS — Z91.81 AT HIGH RISK FOR FALLS: ICD-10-CM

## 2025-08-04 DIAGNOSIS — K52.9 COLITIS: ICD-10-CM

## 2025-08-04 DIAGNOSIS — I73.9 PERIPHERAL ARTERIAL DISEASE: ICD-10-CM

## 2025-08-04 DIAGNOSIS — N39.0 UTI (URINARY TRACT INFECTION), UNCOMPLICATED: ICD-10-CM

## 2025-08-04 DIAGNOSIS — N17.9 AKI (ACUTE KIDNEY INJURY): Primary | ICD-10-CM

## 2025-08-04 LAB
ALBUMIN SERPL BCP-MCNC: 3.9 G/DL (ref 3.4–5)
ALP SERPL-CCNC: 101 U/L (ref 33–136)
ALT SERPL W P-5'-P-CCNC: 42 U/L (ref 7–45)
ANION GAP BLDV CALCULATED.4IONS-SCNC: 17 MMOL/L (ref 10–25)
ANION GAP SERPL CALC-SCNC: 16 MMOL/L (ref 10–20)
APPEARANCE UR: ABNORMAL
AST SERPL W P-5'-P-CCNC: 23 U/L (ref 9–39)
ATRIAL RATE: 72 BPM
BACTERIA #/AREA URNS AUTO: ABNORMAL /HPF
BASE EXCESS BLDV CALC-SCNC: -9.1 MMOL/L (ref -2–3)
BASOPHILS # BLD AUTO: 0.04 X10*3/UL (ref 0–0.1)
BASOPHILS NFR BLD AUTO: 0.6 %
BILIRUB SERPL-MCNC: 0.4 MG/DL (ref 0–1.2)
BILIRUB UR STRIP.AUTO-MCNC: NEGATIVE MG/DL
BNP SERPL-MCNC: 103 PG/ML (ref 0–99)
BODY TEMPERATURE: 37 DEGREES CELSIUS
BUN SERPL-MCNC: 99 MG/DL (ref 6–23)
CA-I BLDV-SCNC: 1.29 MMOL/L (ref 1.1–1.33)
CALCIUM SERPL-MCNC: 9.5 MG/DL (ref 8.6–10.6)
CARDIAC TROPONIN I PNL SERPL HS: 20 NG/L (ref 0–34)
CHLORIDE BLDV-SCNC: 112 MMOL/L (ref 98–107)
CHLORIDE SERPL-SCNC: 114 MMOL/L (ref 98–107)
CO2 SERPL-SCNC: 15 MMOL/L (ref 21–32)
COLOR UR: COLORLESS
CREAT SERPL-MCNC: 4.18 MG/DL (ref 0.5–1.05)
EGFRCR SERPLBLD CKD-EPI 2021: 12 ML/MIN/1.73M*2
EOSINOPHIL # BLD AUTO: 0.12 X10*3/UL (ref 0–0.7)
EOSINOPHIL NFR BLD AUTO: 1.9 %
ERYTHROCYTE [DISTWIDTH] IN BLOOD BY AUTOMATED COUNT: 14.6 % (ref 11.5–14.5)
GLUCOSE BLDV-MCNC: 171 MG/DL (ref 74–99)
GLUCOSE SERPL-MCNC: 165 MG/DL (ref 74–99)
GLUCOSE UR STRIP.AUTO-MCNC: NORMAL MG/DL
HCO3 BLDV-SCNC: 16.9 MMOL/L (ref 22–26)
HCT VFR BLD AUTO: 31.8 % (ref 36–46)
HCT VFR BLD EST: 31 % (ref 36–46)
HGB BLD-MCNC: 10.9 G/DL (ref 12–16)
HGB BLDV-MCNC: 10.3 G/DL (ref 12–16)
IMM GRANULOCYTES # BLD AUTO: 0.01 X10*3/UL (ref 0–0.7)
IMM GRANULOCYTES NFR BLD AUTO: 0.2 % (ref 0–0.9)
INHALED O2 CONCENTRATION: 21 %
KETONES UR STRIP.AUTO-MCNC: NEGATIVE MG/DL
LACTATE BLDV-SCNC: 0.8 MMOL/L (ref 0.4–2)
LEUKOCYTE ESTERASE UR QL STRIP.AUTO: ABNORMAL
LIPASE SERPL-CCNC: 18 U/L (ref 9–82)
LYMPHOCYTES # BLD AUTO: 2.48 X10*3/UL (ref 1.2–4.8)
LYMPHOCYTES NFR BLD AUTO: 39.6 %
MCH RBC QN AUTO: 30.4 PG (ref 26–34)
MCHC RBC AUTO-ENTMCNC: 34.3 G/DL (ref 32–36)
MCV RBC AUTO: 89 FL (ref 80–100)
MONOCYTES # BLD AUTO: 0.46 X10*3/UL (ref 0.1–1)
MONOCYTES NFR BLD AUTO: 7.3 %
MUCOUS THREADS #/AREA URNS AUTO: ABNORMAL /LPF
NEUTROPHILS # BLD AUTO: 3.16 X10*3/UL (ref 1.2–7.7)
NEUTROPHILS NFR BLD AUTO: 50.4 %
NITRITE UR QL STRIP.AUTO: NEGATIVE
NRBC BLD-RTO: 0 /100 WBCS (ref 0–0)
OXYHGB MFR BLDV: 73.6 % (ref 45–75)
P AXIS: 56 DEGREES
P OFFSET: 210 MS
P ONSET: 156 MS
PCO2 BLDV: 36 MM HG (ref 41–51)
PH BLDV: 7.28 PH (ref 7.33–7.43)
PH UR STRIP.AUTO: 6 [PH]
PLATELET # BLD AUTO: 225 X10*3/UL (ref 150–450)
PO2 BLDV: 40 MM HG (ref 35–45)
POTASSIUM BLDV-SCNC: 5.5 MMOL/L (ref 3.5–5.3)
POTASSIUM SERPL-SCNC: 4.3 MMOL/L (ref 3.5–5.3)
PR INTERVAL: 124 MS
PROT SERPL-MCNC: 7.9 G/DL (ref 6.4–8.2)
PROT UR STRIP.AUTO-MCNC: ABNORMAL MG/DL
Q ONSET: 218 MS
QRS COUNT: 12 BEATS
QRS DURATION: 84 MS
QT INTERVAL: 382 MS
QTC CALCULATION(BAZETT): 418 MS
QTC FREDERICIA: 406 MS
R AXIS: 159 DEGREES
RBC # BLD AUTO: 3.58 X10*6/UL (ref 4–5.2)
RBC # UR STRIP.AUTO: ABNORMAL MG/DL
RBC #/AREA URNS AUTO: ABNORMAL /HPF
SAO2 % BLDV: 75 % (ref 45–75)
SODIUM BLDV-SCNC: 140 MMOL/L (ref 136–145)
SODIUM SERPL-SCNC: 141 MMOL/L (ref 136–145)
SP GR UR STRIP.AUTO: 1.01
SQUAMOUS #/AREA URNS AUTO: ABNORMAL /HPF
T AXIS: 29 DEGREES
T OFFSET: 409 MS
UROBILINOGEN UR STRIP.AUTO-MCNC: NORMAL MG/DL
VENTRICULAR RATE: 72 BPM
WBC # BLD AUTO: 6.3 X10*3/UL (ref 4.4–11.3)
WBC #/AREA URNS AUTO: >50 /HPF
WBC CLUMPS #/AREA URNS AUTO: ABNORMAL /HPF

## 2025-08-04 PROCEDURE — 83690 ASSAY OF LIPASE: CPT | Performed by: STUDENT IN AN ORGANIZED HEALTH CARE EDUCATION/TRAINING PROGRAM

## 2025-08-04 PROCEDURE — 51798 US URINE CAPACITY MEASURE: CPT

## 2025-08-04 PROCEDURE — 36415 COLL VENOUS BLD VENIPUNCTURE: CPT

## 2025-08-04 PROCEDURE — 83880 ASSAY OF NATRIURETIC PEPTIDE: CPT

## 2025-08-04 PROCEDURE — 99285 EMERGENCY DEPT VISIT HI MDM: CPT | Performed by: EMERGENCY MEDICINE

## 2025-08-04 PROCEDURE — 1210000001 HC SEMI-PRIVATE ROOM DAILY

## 2025-08-04 PROCEDURE — 96365 THER/PROPH/DIAG IV INF INIT: CPT

## 2025-08-04 PROCEDURE — 81001 URINALYSIS AUTO W/SCOPE: CPT | Performed by: EMERGENCY MEDICINE

## 2025-08-04 PROCEDURE — 84132 ASSAY OF SERUM POTASSIUM: CPT

## 2025-08-04 PROCEDURE — 2500000004 HC RX 250 GENERAL PHARMACY W/ HCPCS (ALT 636 FOR OP/ED)

## 2025-08-04 PROCEDURE — 83690 ASSAY OF LIPASE: CPT

## 2025-08-04 PROCEDURE — 84484 ASSAY OF TROPONIN QUANT: CPT

## 2025-08-04 PROCEDURE — 85025 COMPLETE CBC W/AUTO DIFF WBC: CPT

## 2025-08-04 PROCEDURE — 93005 ELECTROCARDIOGRAM TRACING: CPT

## 2025-08-04 PROCEDURE — 84075 ASSAY ALKALINE PHOSPHATASE: CPT

## 2025-08-04 RX ORDER — CEFEPIME HYDROCHLORIDE 2 G/50ML
2 INJECTION, SOLUTION INTRAVENOUS EVERY 24 HOURS
Status: DISCONTINUED | OUTPATIENT
Start: 2025-08-04 | End: 2025-08-06

## 2025-08-04 RX ADMIN — CEFEPIME HYDROCHLORIDE 2 G: 2 INJECTION, SOLUTION INTRAVENOUS at 21:49

## 2025-08-04 ASSESSMENT — PAIN - FUNCTIONAL ASSESSMENT: PAIN_FUNCTIONAL_ASSESSMENT: 0-10

## 2025-08-04 ASSESSMENT — PAIN SCALES - GENERAL: PAINLEVEL_OUTOF10: 6

## 2025-08-04 ASSESSMENT — PAIN DESCRIPTION - LOCATION: LOCATION: ABDOMEN

## 2025-08-04 NOTE — ED TRIAGE NOTES
Pt presents to ED for abnormal labs. Pt had blood work done 8/1, results show elevated BUN and creatinine. Pt endorses mild abdominal pain, denies urinary complaints. Pt states she was told by CNP to come to ED for further evaluation. Pt potentially being placed on dialysis.

## 2025-08-05 ENCOUNTER — APPOINTMENT (OUTPATIENT)
Dept: RADIOLOGY | Facility: HOSPITAL | Age: 60
End: 2025-08-05
Payer: COMMERCIAL

## 2025-08-05 LAB
ALBUMIN SERPL BCP-MCNC: 3.4 G/DL (ref 3.4–5)
ALP SERPL-CCNC: 88 U/L (ref 33–136)
ALT SERPL W P-5'-P-CCNC: 42 U/L (ref 7–45)
ANION GAP SERPL CALC-SCNC: 12 MMOL/L (ref 10–20)
APPEARANCE UR: ABNORMAL
AST SERPL W P-5'-P-CCNC: 24 U/L (ref 9–39)
BACTERIA #/AREA URNS AUTO: ABNORMAL /HPF
BASOPHILS # BLD AUTO: 0.03 X10*3/UL (ref 0–0.1)
BASOPHILS NFR BLD AUTO: 0.5 %
BILIRUB SERPL-MCNC: 0.4 MG/DL (ref 0–1.2)
BILIRUB UR STRIP.AUTO-MCNC: NEGATIVE MG/DL
BUN SERPL-MCNC: 96 MG/DL (ref 6–23)
CALCIUM SERPL-MCNC: 9 MG/DL (ref 8.6–10.6)
CHLORIDE SERPL-SCNC: 116 MMOL/L (ref 98–107)
CO2 SERPL-SCNC: 18 MMOL/L (ref 21–32)
COLOR UR: COLORLESS
CREAT SERPL-MCNC: 4.19 MG/DL (ref 0.5–1.05)
EGFRCR SERPLBLD CKD-EPI 2021: 12 ML/MIN/1.73M*2
EOSINOPHIL # BLD AUTO: 0.15 X10*3/UL (ref 0–0.7)
EOSINOPHIL NFR BLD AUTO: 2.5 %
ERYTHROCYTE [DISTWIDTH] IN BLOOD BY AUTOMATED COUNT: 14.8 % (ref 11.5–14.5)
GLUCOSE BLD MANUAL STRIP-MCNC: 100 MG/DL (ref 74–99)
GLUCOSE BLD MANUAL STRIP-MCNC: 108 MG/DL (ref 74–99)
GLUCOSE BLD MANUAL STRIP-MCNC: 126 MG/DL (ref 74–99)
GLUCOSE BLD MANUAL STRIP-MCNC: 143 MG/DL (ref 74–99)
GLUCOSE BLD MANUAL STRIP-MCNC: 187 MG/DL (ref 74–99)
GLUCOSE SERPL-MCNC: 106 MG/DL (ref 74–99)
GLUCOSE UR STRIP.AUTO-MCNC: NORMAL MG/DL
HCT VFR BLD AUTO: 30.7 % (ref 36–46)
HGB BLD-MCNC: 9.7 G/DL (ref 12–16)
HOLD SPECIMEN: NORMAL
IMM GRANULOCYTES # BLD AUTO: 0.01 X10*3/UL (ref 0–0.7)
IMM GRANULOCYTES NFR BLD AUTO: 0.2 % (ref 0–0.9)
KETONES UR STRIP.AUTO-MCNC: NEGATIVE MG/DL
LEUKOCYTE ESTERASE UR QL STRIP.AUTO: ABNORMAL
LIPASE SERPL-CCNC: 19 U/L (ref 9–82)
LYMPHOCYTES # BLD AUTO: 2.79 X10*3/UL (ref 1.2–4.8)
LYMPHOCYTES NFR BLD AUTO: 45.7 %
MAGNESIUM SERPL-MCNC: 2.03 MG/DL (ref 1.6–2.4)
MCH RBC QN AUTO: 30.4 PG (ref 26–34)
MCHC RBC AUTO-ENTMCNC: 31.6 G/DL (ref 32–36)
MCV RBC AUTO: 96 FL (ref 80–100)
MONOCYTES # BLD AUTO: 0.45 X10*3/UL (ref 0.1–1)
MONOCYTES NFR BLD AUTO: 7.4 %
NEUTROPHILS # BLD AUTO: 2.68 X10*3/UL (ref 1.2–7.7)
NEUTROPHILS NFR BLD AUTO: 43.7 %
NITRITE UR QL STRIP.AUTO: NEGATIVE
NRBC BLD-RTO: 0 /100 WBCS (ref 0–0)
PH UR STRIP.AUTO: 6 [PH]
PLATELET # BLD AUTO: 213 X10*3/UL (ref 150–450)
POTASSIUM SERPL-SCNC: 4.2 MMOL/L (ref 3.5–5.3)
PROT SERPL-MCNC: 7 G/DL (ref 6.4–8.2)
PROT UR STRIP.AUTO-MCNC: ABNORMAL MG/DL
RBC # BLD AUTO: 3.19 X10*6/UL (ref 4–5.2)
RBC # UR STRIP.AUTO: ABNORMAL MG/DL
RBC #/AREA URNS AUTO: ABNORMAL /HPF
SODIUM SERPL-SCNC: 142 MMOL/L (ref 136–145)
SP GR UR STRIP.AUTO: 1.01
SQUAMOUS #/AREA URNS AUTO: ABNORMAL /HPF
UROBILINOGEN UR STRIP.AUTO-MCNC: NORMAL MG/DL
WBC # BLD AUTO: 6.1 X10*3/UL (ref 4.4–11.3)
WBC #/AREA URNS AUTO: >50 /HPF
WBC CLUMPS #/AREA URNS AUTO: ABNORMAL /HPF

## 2025-08-05 PROCEDURE — 2500000001 HC RX 250 WO HCPCS SELF ADMINISTERED DRUGS (ALT 637 FOR MEDICARE OP): Performed by: STUDENT IN AN ORGANIZED HEALTH CARE EDUCATION/TRAINING PROGRAM

## 2025-08-05 PROCEDURE — 80053 COMPREHEN METABOLIC PANEL: CPT | Performed by: STUDENT IN AN ORGANIZED HEALTH CARE EDUCATION/TRAINING PROGRAM

## 2025-08-05 PROCEDURE — 2500000002 HC RX 250 W HCPCS SELF ADMINISTERED DRUGS (ALT 637 FOR MEDICARE OP, ALT 636 FOR OP/ED): Performed by: STUDENT IN AN ORGANIZED HEALTH CARE EDUCATION/TRAINING PROGRAM

## 2025-08-05 PROCEDURE — 2500000004 HC RX 250 GENERAL PHARMACY W/ HCPCS (ALT 636 FOR OP/ED)

## 2025-08-05 PROCEDURE — 85025 COMPLETE CBC W/AUTO DIFF WBC: CPT | Performed by: STUDENT IN AN ORGANIZED HEALTH CARE EDUCATION/TRAINING PROGRAM

## 2025-08-05 PROCEDURE — 83735 ASSAY OF MAGNESIUM: CPT | Performed by: STUDENT IN AN ORGANIZED HEALTH CARE EDUCATION/TRAINING PROGRAM

## 2025-08-05 PROCEDURE — 2500000004 HC RX 250 GENERAL PHARMACY W/ HCPCS (ALT 636 FOR OP/ED): Performed by: STUDENT IN AN ORGANIZED HEALTH CARE EDUCATION/TRAINING PROGRAM

## 2025-08-05 PROCEDURE — 97161 PT EVAL LOW COMPLEX 20 MIN: CPT | Mod: GP

## 2025-08-05 PROCEDURE — 81001 URINALYSIS AUTO W/SCOPE: CPT | Performed by: STUDENT IN AN ORGANIZED HEALTH CARE EDUCATION/TRAINING PROGRAM

## 2025-08-05 PROCEDURE — 71045 X-RAY EXAM CHEST 1 VIEW: CPT

## 2025-08-05 PROCEDURE — 87086 URINE CULTURE/COLONY COUNT: CPT | Performed by: STUDENT IN AN ORGANIZED HEALTH CARE EDUCATION/TRAINING PROGRAM

## 2025-08-05 PROCEDURE — 74018 RADEX ABDOMEN 1 VIEW: CPT

## 2025-08-05 PROCEDURE — 99223 1ST HOSP IP/OBS HIGH 75: CPT | Performed by: STUDENT IN AN ORGANIZED HEALTH CARE EDUCATION/TRAINING PROGRAM

## 2025-08-05 PROCEDURE — 82947 ASSAY GLUCOSE BLOOD QUANT: CPT

## 2025-08-05 PROCEDURE — 74018 RADEX ABDOMEN 1 VIEW: CPT | Performed by: RADIOLOGY

## 2025-08-05 PROCEDURE — 99222 1ST HOSP IP/OBS MODERATE 55: CPT | Performed by: INTERNAL MEDICINE

## 2025-08-05 PROCEDURE — 71045 X-RAY EXAM CHEST 1 VIEW: CPT | Performed by: RADIOLOGY

## 2025-08-05 PROCEDURE — 1210000001 HC SEMI-PRIVATE ROOM DAILY

## 2025-08-05 PROCEDURE — 36415 COLL VENOUS BLD VENIPUNCTURE: CPT | Performed by: STUDENT IN AN ORGANIZED HEALTH CARE EDUCATION/TRAINING PROGRAM

## 2025-08-05 PROCEDURE — 97165 OT EVAL LOW COMPLEX 30 MIN: CPT | Mod: GO

## 2025-08-05 RX ORDER — DEXTROSE 50 % IN WATER (D50W) INTRAVENOUS SYRINGE
12.5
Status: DISCONTINUED | OUTPATIENT
Start: 2025-08-05 | End: 2025-08-08 | Stop reason: HOSPADM

## 2025-08-05 RX ORDER — ALUMINUM HYDROXIDE, MAGNESIUM HYDROXIDE, AND SIMETHICONE 1200; 120; 1200 MG/30ML; MG/30ML; MG/30ML
10 SUSPENSION ORAL 4 TIMES DAILY PRN
Status: DISCONTINUED | OUTPATIENT
Start: 2025-08-05 | End: 2025-08-07

## 2025-08-05 RX ORDER — HEPARIN SODIUM 5000 [USP'U]/ML
5000 INJECTION, SOLUTION INTRAVENOUS; SUBCUTANEOUS EVERY 8 HOURS
Status: DISCONTINUED | OUTPATIENT
Start: 2025-08-05 | End: 2025-08-08 | Stop reason: HOSPADM

## 2025-08-05 RX ORDER — TAMSULOSIN HYDROCHLORIDE 0.4 MG/1
0.8 CAPSULE ORAL DAILY
Status: DISCONTINUED | OUTPATIENT
Start: 2025-08-05 | End: 2025-08-08 | Stop reason: HOSPADM

## 2025-08-05 RX ORDER — PANTOPRAZOLE SODIUM 40 MG/1
40 TABLET, DELAYED RELEASE ORAL
Status: DISCONTINUED | OUTPATIENT
Start: 2025-08-05 | End: 2025-08-08 | Stop reason: HOSPADM

## 2025-08-05 RX ORDER — DEXTROSE 50 % IN WATER (D50W) INTRAVENOUS SYRINGE
25
Status: DISCONTINUED | OUTPATIENT
Start: 2025-08-05 | End: 2025-08-08 | Stop reason: HOSPADM

## 2025-08-05 RX ORDER — HYDRALAZINE HYDROCHLORIDE 50 MG/1
50 TABLET, FILM COATED ORAL 3 TIMES DAILY
Status: DISCONTINUED | OUTPATIENT
Start: 2025-08-05 | End: 2025-08-08 | Stop reason: HOSPADM

## 2025-08-05 RX ORDER — B-COMPLEX WITH VITAMIN C
1 TABLET ORAL DAILY
Status: DISCONTINUED | OUTPATIENT
Start: 2025-08-06 | End: 2025-08-08 | Stop reason: HOSPADM

## 2025-08-05 RX ORDER — INSULIN LISPRO 100 [IU]/ML
0-5 INJECTION, SOLUTION INTRAVENOUS; SUBCUTANEOUS
Status: DISCONTINUED | OUTPATIENT
Start: 2025-08-05 | End: 2025-08-08 | Stop reason: HOSPADM

## 2025-08-05 RX ORDER — CARVEDILOL 12.5 MG/1
12.5 TABLET ORAL 2 TIMES DAILY
Status: DISCONTINUED | OUTPATIENT
Start: 2025-08-05 | End: 2025-08-08 | Stop reason: HOSPADM

## 2025-08-05 RX ORDER — ACETAMINOPHEN 325 MG/1
650 TABLET ORAL EVERY 6 HOURS PRN
Status: DISCONTINUED | OUTPATIENT
Start: 2025-08-05 | End: 2025-08-08 | Stop reason: HOSPADM

## 2025-08-05 RX ORDER — ISOSORBIDE MONONITRATE 30 MG/1
30 TABLET, EXTENDED RELEASE ORAL DAILY
Status: DISCONTINUED | OUTPATIENT
Start: 2025-08-05 | End: 2025-08-08 | Stop reason: HOSPADM

## 2025-08-05 RX ORDER — ASPIRIN 81 MG/1
81 TABLET ORAL DAILY
Status: DISCONTINUED | OUTPATIENT
Start: 2025-08-06 | End: 2025-08-08 | Stop reason: HOSPADM

## 2025-08-05 RX ORDER — FERROUS SULFATE 325(65) MG
65 TABLET ORAL
Status: DISCONTINUED | OUTPATIENT
Start: 2025-08-05 | End: 2025-08-08 | Stop reason: HOSPADM

## 2025-08-05 RX ORDER — ASPIRIN 81 MG/1
81 TABLET ORAL DAILY
COMMUNITY

## 2025-08-05 RX ORDER — POLYETHYLENE GLYCOL 3350 17 G/17G
17 POWDER, FOR SOLUTION ORAL DAILY
Status: DISCONTINUED | OUTPATIENT
Start: 2025-08-05 | End: 2025-08-08

## 2025-08-05 RX ORDER — ATORVASTATIN CALCIUM 40 MG/1
40 TABLET, FILM COATED ORAL DAILY
Status: DISCONTINUED | OUTPATIENT
Start: 2025-08-05 | End: 2025-08-08 | Stop reason: HOSPADM

## 2025-08-05 RX ORDER — TORSEMIDE 20 MG/1
40 TABLET ORAL DAILY
Status: DISCONTINUED | OUTPATIENT
Start: 2025-08-05 | End: 2025-08-08 | Stop reason: HOSPADM

## 2025-08-05 RX ORDER — GABAPENTIN 100 MG/1
100 CAPSULE ORAL NIGHTLY
Status: DISCONTINUED | OUTPATIENT
Start: 2025-08-05 | End: 2025-08-08 | Stop reason: HOSPADM

## 2025-08-05 RX ADMIN — ALUMINUM HYDROXIDE, MAGNESIUM HYDROXIDE, AND SIMETHICONE 10 ML: 200; 200; 20 SUSPENSION ORAL at 11:36

## 2025-08-05 RX ADMIN — CARVEDILOL 12.5 MG: 12.5 TABLET, FILM COATED ORAL at 08:59

## 2025-08-05 RX ADMIN — HEPARIN SODIUM 5000 UNITS: 5000 INJECTION INTRAVENOUS; SUBCUTANEOUS at 09:00

## 2025-08-05 RX ADMIN — HEPARIN SODIUM 5000 UNITS: 5000 INJECTION INTRAVENOUS; SUBCUTANEOUS at 01:19

## 2025-08-05 RX ADMIN — FERROUS SULFATE TAB 325 MG (65 MG ELEMENTAL FE) 1 TABLET: 325 (65 FE) TAB at 08:59

## 2025-08-05 RX ADMIN — TAMSULOSIN HYDROCHLORIDE 0.8 MG: 0.4 CAPSULE ORAL at 08:59

## 2025-08-05 RX ADMIN — HYDRALAZINE HYDROCHLORIDE 50 MG: 50 TABLET ORAL at 21:33

## 2025-08-05 RX ADMIN — TORSEMIDE 40 MG: 20 TABLET ORAL at 09:05

## 2025-08-05 RX ADMIN — CEFEPIME HYDROCHLORIDE 2 G: 2 INJECTION, SOLUTION INTRAVENOUS at 21:33

## 2025-08-05 RX ADMIN — HYDRALAZINE HYDROCHLORIDE 50 MG: 50 TABLET ORAL at 16:39

## 2025-08-05 RX ADMIN — HEPARIN SODIUM 5000 UNITS: 5000 INJECTION INTRAVENOUS; SUBCUTANEOUS at 16:39

## 2025-08-05 RX ADMIN — HYDRALAZINE HYDROCHLORIDE 50 MG: 50 TABLET ORAL at 08:59

## 2025-08-05 RX ADMIN — ATORVASTATIN CALCIUM 40 MG: 40 TABLET, FILM COATED ORAL at 08:59

## 2025-08-05 RX ADMIN — CARVEDILOL 12.5 MG: 12.5 TABLET, FILM COATED ORAL at 01:19

## 2025-08-05 RX ADMIN — CARVEDILOL 12.5 MG: 12.5 TABLET, FILM COATED ORAL at 21:33

## 2025-08-05 RX ADMIN — ISOSORBIDE MONONITRATE 30 MG: 30 TABLET, EXTENDED RELEASE ORAL at 08:59

## 2025-08-05 RX ADMIN — POLYETHYLENE GLYCOL 3350 17 G: 17 POWDER, FOR SOLUTION ORAL at 11:41

## 2025-08-05 SDOH — SOCIAL STABILITY: SOCIAL INSECURITY: HAVE YOU HAD THOUGHTS OF HARMING ANYONE ELSE?: NO

## 2025-08-05 SDOH — SOCIAL STABILITY: SOCIAL INSECURITY: ARE THERE ANY APPARENT SIGNS OF INJURIES/BEHAVIORS THAT COULD BE RELATED TO ABUSE/NEGLECT?: NO

## 2025-08-05 SDOH — ECONOMIC STABILITY: HOUSING INSECURITY: AT ANY TIME IN THE PAST 12 MONTHS, WERE YOU HOMELESS OR LIVING IN A SHELTER (INCLUDING NOW)?: NO

## 2025-08-05 SDOH — ECONOMIC STABILITY: FOOD INSECURITY: WITHIN THE PAST 12 MONTHS, THE FOOD YOU BOUGHT JUST DIDN'T LAST AND YOU DIDN'T HAVE MONEY TO GET MORE.: PATIENT DECLINED

## 2025-08-05 SDOH — SOCIAL STABILITY: SOCIAL INSECURITY: DO YOU FEEL ANYONE HAS EXPLOITED OR TAKEN ADVANTAGE OF YOU FINANCIALLY OR OF YOUR PERSONAL PROPERTY?: NO

## 2025-08-05 SDOH — SOCIAL STABILITY: SOCIAL INSECURITY: DOES ANYONE TRY TO KEEP YOU FROM HAVING/CONTACTING OTHER FRIENDS OR DOING THINGS OUTSIDE YOUR HOME?: NO

## 2025-08-05 SDOH — ECONOMIC STABILITY: HOUSING INSECURITY: IN THE LAST 12 MONTHS, WAS THERE A TIME WHEN YOU WERE NOT ABLE TO PAY THE MORTGAGE OR RENT ON TIME?: NO

## 2025-08-05 SDOH — SOCIAL STABILITY: SOCIAL INSECURITY: WITHIN THE LAST YEAR, HAVE YOU BEEN AFRAID OF YOUR PARTNER OR EX-PARTNER?: PATIENT DECLINED

## 2025-08-05 SDOH — ECONOMIC STABILITY: FOOD INSECURITY: HOW HARD IS IT FOR YOU TO PAY FOR THE VERY BASICS LIKE FOOD, HOUSING, MEDICAL CARE, AND HEATING?: NOT HARD AT ALL

## 2025-08-05 SDOH — SOCIAL STABILITY: SOCIAL INSECURITY: WERE YOU ABLE TO COMPLETE ALL THE BEHAVIORAL HEALTH SCREENINGS?: YES

## 2025-08-05 SDOH — SOCIAL STABILITY: SOCIAL INSECURITY: HAS ANYONE EVER THREATENED TO HURT YOUR FAMILY OR YOUR PETS?: NO

## 2025-08-05 SDOH — ECONOMIC STABILITY: HOUSING INSECURITY: IN THE PAST 12 MONTHS, HOW MANY TIMES HAVE YOU MOVED WHERE YOU WERE LIVING?: 1

## 2025-08-05 SDOH — SOCIAL STABILITY: SOCIAL INSECURITY: HAVE YOU HAD ANY THOUGHTS OF HARMING ANYONE ELSE?: NO

## 2025-08-05 SDOH — ECONOMIC STABILITY: TRANSPORTATION INSECURITY: IN THE PAST 12 MONTHS, HAS LACK OF TRANSPORTATION KEPT YOU FROM MEDICAL APPOINTMENTS OR FROM GETTING MEDICATIONS?: NO

## 2025-08-05 SDOH — SOCIAL STABILITY: SOCIAL INSECURITY: ABUSE: ADULT

## 2025-08-05 SDOH — SOCIAL STABILITY: SOCIAL INSECURITY: DO YOU FEEL UNSAFE GOING BACK TO THE PLACE WHERE YOU ARE LIVING?: NO

## 2025-08-05 ASSESSMENT — LIFESTYLE VARIABLES
HOW MANY STANDARD DRINKS CONTAINING ALCOHOL DO YOU HAVE ON A TYPICAL DAY: 1 OR 2
HOW OFTEN DO YOU HAVE A DRINK CONTAINING ALCOHOL: NEVER
HOW OFTEN DO YOU HAVE 6 OR MORE DRINKS ON ONE OCCASION: LESS THAN MONTHLY
AUDIT-C TOTAL SCORE: 1
SKIP TO QUESTIONS 9-10: 0
AUDIT-C TOTAL SCORE: 1

## 2025-08-05 ASSESSMENT — COGNITIVE AND FUNCTIONAL STATUS - GENERAL
HELP NEEDED FOR BATHING: A LITTLE
PERSONAL GROOMING: TOTAL
MOVING FROM LYING ON BACK TO SITTING ON SIDE OF FLAT BED WITH BEDRAILS: A LITTLE
TOILETING: A LITTLE
MOBILITY SCORE: 18
PERSONAL GROOMING: A LITTLE
DAILY ACTIVITIY SCORE: 18
TOILETING: A LITTLE
DRESSING REGULAR LOWER BODY CLOTHING: A LITTLE
DRESSING REGULAR UPPER BODY CLOTHING: A LITTLE
DRESSING REGULAR LOWER BODY CLOTHING: A LITTLE
DAILY ACTIVITIY SCORE: 21
MOVING TO AND FROM BED TO CHAIR: A LITTLE
DRESSING REGULAR UPPER BODY CLOTHING: A LITTLE
WALKING IN HOSPITAL ROOM: A LOT
MOBILITY SCORE: 20
HELP NEEDED FOR BATHING: A LITTLE
MOBILITY SCORE: 17
MOBILITY SCORE: 17
CLIMB 3 TO 5 STEPS WITH RAILING: A LITTLE
PATIENT BASELINE BEDBOUND: NO
TOILETING: A LITTLE
TURNING FROM BACK TO SIDE WHILE IN FLAT BAD: A LITTLE
EATING MEALS: A LITTLE
TURNING FROM BACK TO SIDE WHILE IN FLAT BAD: A LITTLE
CLIMB 3 TO 5 STEPS WITH RAILING: A LOT
TURNING FROM BACK TO SIDE WHILE IN FLAT BAD: A LITTLE
WALKING IN HOSPITAL ROOM: A LITTLE
DAILY ACTIVITIY SCORE: 22
CLIMB 3 TO 5 STEPS WITH RAILING: A LITTLE
STANDING UP FROM CHAIR USING ARMS: A LITTLE
MOVING TO AND FROM BED TO CHAIR: A LITTLE
MOBILITY SCORE: 22
STANDING UP FROM CHAIR USING ARMS: A LITTLE
TOILETING: A LITTLE
WALKING IN HOSPITAL ROOM: A LITTLE
HELP NEEDED FOR BATHING: A LITTLE
WALKING IN HOSPITAL ROOM: A LITTLE
DRESSING REGULAR LOWER BODY CLOTHING: A LITTLE
CLIMB 3 TO 5 STEPS WITH RAILING: A LOT
STANDING UP FROM CHAIR USING ARMS: A LITTLE
DAILY ACTIVITIY SCORE: 20
CLIMB 3 TO 5 STEPS WITH RAILING: A LOT
DRESSING REGULAR LOWER BODY CLOTHING: A LITTLE
WALKING IN HOSPITAL ROOM: A LOT
DAILY ACTIVITIY SCORE: 20
STANDING UP FROM CHAIR USING ARMS: A LITTLE
MOVING TO AND FROM BED TO CHAIR: A LITTLE
DRESSING REGULAR LOWER BODY CLOTHING: A LITTLE

## 2025-08-05 ASSESSMENT — PAIN SCALES - GENERAL
PAINLEVEL_OUTOF10: 0 - NO PAIN

## 2025-08-05 ASSESSMENT — ACTIVITIES OF DAILY LIVING (ADL)
ADL_ASSISTANCE: INDEPENDENT
BATHING: INDEPENDENT
ASSISTIVE_DEVICE: WALKER
DRESSING YOURSELF: INDEPENDENT
PATIENT'S MEMORY ADEQUATE TO SAFELY COMPLETE DAILY ACTIVITIES?: YES
WALKS IN HOME: NEEDS ASSISTANCE
FEEDING YOURSELF: INDEPENDENT
ADEQUATE_TO_COMPLETE_ADL: YES
TOILETING: INDEPENDENT
LACK_OF_TRANSPORTATION: NO
JUDGMENT_ADEQUATE_SAFELY_COMPLETE_DAILY_ACTIVITIES: YES
HEARING - LEFT EAR: FUNCTIONAL
GROOMING: INDEPENDENT
BATHING_ASSISTANCE: STAND BY
HEARING - RIGHT EAR: FUNCTIONAL

## 2025-08-05 ASSESSMENT — PAIN - FUNCTIONAL ASSESSMENT
PAIN_FUNCTIONAL_ASSESSMENT: 0-10

## 2025-08-05 ASSESSMENT — PATIENT HEALTH QUESTIONNAIRE - PHQ9
SUM OF ALL RESPONSES TO PHQ9 QUESTIONS 1 & 2: 1
2. FEELING DOWN, DEPRESSED OR HOPELESS: NOT AT ALL
1. LITTLE INTEREST OR PLEASURE IN DOING THINGS: SEVERAL DAYS

## 2025-08-05 NOTE — ED PROVIDER NOTES
Emergency Department Provider Note        History of Present Illness     History provided by: Patient  Limitations to History: None  External Records Reviewed with Brief Summary: None    HPI:  Nuris Squires is a 60 y.o. female history of chronic kidney disease stage IV not on dialysis, moving associated ATN and urinary retention who presents for abnormal labs.  She was seen by her nephrology team on Friday blood work was done.  She was found to have a significant elevated BUN and creatinine from her baseline which prompted them to recommend that patient come to the ED.  Patient states that she has a history of urinary retention and feels like she has not been completely voiding.  She has had catheters placed for 5 times due to urinary retention.  She has also been complaining of abdominal pain mainly in the epigastric region without radiation.  Denies any chest pain, shortness of breath, fevers, chills, dysuria, hematuria.    Physical Exam   Triage vitals:  T 36.3 °C (97.3 °F)  HR 86  BP (!) 182/86  RR 16  O2 97 % None (Room air)    General: Awake, alert, in no acute distress  Eyes: Gaze conjugate.  No scleral icterus or injection  HENT: Normo-cephalic, atraumatic. No stridor  CV: Regular rate, regular rhythm. Radial pulses 2+ bilaterally  Resp: Breathing non-labored, speaking in full sentences.  Clear to auscultation bilaterally  GI: Tender palpation epigastric area,  But otherwise soft, non-distended, No rebound or guarding.      Medical Decision Making & ED Course   Medical Decision Makin y.o. female history of chronic kidney disease stage IV not on dialysis, moving associated ATN and urinary retention who presented for abnormal labs.  Patient has otherwise been stable.  He is afebrile.  Her lab work came back benign and she has been resting comfortably which makes her less suspicious for abdominal pathology.  She has no flank tenderness and afebrile which was less suspicious for pyelonephritis.  Her  lipase was normal.  Patient does not appear in discomfort that make me suspicious for a perforated ulcer.  Her UA came back suspicious for UTI.  She was started on cefepime 2 mg here in the ED.  Her VBG came back with nongap acidosis at a pH of 7.28.  Her acidosis may likely be a consequence of her decline kidney function.  I discussed with patient that he is able to follow-up quickly with her nephrologist, she may be able to be discharged with close follow-up on antibiotics for UTI.  However with her acidosis, I have recommended that patient be admitted to workup her acidosis in the setting of UTI.  Patient stated she cannot get a nephrology appointment until November so she will like to be admitted for management of her acidosis in the setting of her uremia.    Patient will be admitted to general medicine for workup of her decline kidney function.  ----      Differential diagnoses considered include but are not limited to: Pancreatitis, gastric ulcer, PUD, UTI, acute cystitis, pyelonephritis.     Social Determinants of Health which Significantly Impact Care: None identified     EKG Independent Interpretation: EKG was interpreted: Rate around 70 bpm, normal sinus rhythm.  Right axis deviation.  Intervals within normal limits.  No ST elevations or depressions noted no T wave inversions noted.  Nonischemic EKG. no change from prior EKG.    Independent Result Review and Interpretation: Relevant laboratory and radiographic results were reviewed and independently interpreted by myself.  As necessary, they are commented on in the ED Course.    Chronic conditions affecting the patient's care: As documented above in [facility]    The patient was discussed with the following consultants/services: Admission Coordinator who accepted the patient for admission    Care Considerations: As documented above in [facility]    ED Course:  ED Course as of 08/04/25 2316   Mon Aug 04, 2025   1929 Bladder scan 436 mL.  But it was prevoid. []   1929  With a consistent with UTI. []   2221 Lipase is normal []   2252 Ultrasound of bladder revealed a bladder of 72 mL.  Patient is not retaining. []      ED Course User Index  [] Cipriano Baca DO         Diagnoses as of 08/04/25 2316   VENKAT (acute kidney injury)   UTI (urinary tract infection), uncomplicated     Disposition   Admit to medicine.    Procedures   Procedures    Patient seen and discussed with ED attending physician.    Cipriano Baca DO  Emergency Medicine     Cipriano Baca DO  Resident  08/04/25 0993

## 2025-08-05 NOTE — CARE PLAN
The patient's goals for the shift include      The clinical goals for the shift include  pt will remain without injury       Problem: Pain - Adult  Goal: Verbalizes/displays adequate comfort level or baseline comfort level  Outcome: Progressing  Flowsheets (Taken 8/5/2025 0201)  Verbalizes/displays adequate comfort level or baseline comfort level: Encourage patient to monitor pain and request assistance     Problem: Safety - Adult  Goal: Free from fall injury  Outcome: Progressing  Flowsheets (Taken 8/5/2025 0201)  Free from fall injury: Instruct family/caregiver on patient safety     Problem: Discharge Planning  Goal: Discharge to home or other facility with appropriate resources  Outcome: Progressing  Flowsheets (Taken 8/5/2025 0201)  Discharge to home or other facility with appropriate resources: Identify barriers to discharge with patient and caregiver     Problem: Chronic Conditions and Co-morbidities  Goal: Patient's chronic conditions and co-morbidity symptoms are monitored and maintained or improved  Outcome: Progressing  Flowsheets (Taken 8/5/2025 0201)  Care Plan - Patient's Chronic Conditions and Co-Morbidity Symptoms are Monitored and Maintained or Improved: Monitor and assess patient's chronic conditions and comorbid symptoms for stability, deterioration, or improvement     Problem: Nutrition  Goal: Nutrient intake appropriate for maintaining nutritional needs  Outcome: Progressing

## 2025-08-05 NOTE — CONSULTS
Wound Care Consult     Visit Date: 8/5/2025      Patient Name: Nuris Squires         MRN: [id number]             Reason for Consult: TMA incision        Wound History: Nuris Squires is a 60 year old female with a past medical history CKD stage 4, DM, HTN, HFpEF, HLD, PAD, MO, multiple foot infections, multiple hospital admissions over past 18 months for infections and ADHF frequently with associated VENKAT on CKD who presents to [facility] ED at the request due to worsening labs indicated renal dysfunction. She is admitted to medicine for potential initiation of hemodialysis, observation, and workup of acidosis. TMA 5/25/2025.        Assessment:  Wound 05/25/25 Surgical Foot Anterior;Left (Active)   Date First Assessed: 05/25/25   Primary Wound Type: Surgical  Location: Foot  Wound Location Orientation: Anterior;Left      Assessments 8/5/2025 11:05 AM   Present on Admission to Healthcare Facility Yes   Wound Image     Site Assessment Yellow;Red   Doris-Wound Assessment Dry   Shape Irregular    Wound Length (cm) 2.5 cm   Wound Width (cm) 0.5 cm   Wound Surface Area (cm^2) 0.98 cm^2   Wound Depth (cm) 0.3 cm   Wound Volume (cm^3) 0.196 cm^3   State of Healing Slough   Wound Bed Slough (%) 80 %   Margins Well-defined edges   Closure None   Treatments Cleansed   Sutures/Staple Line Non approximated   Drainage Description None   Drainage Amount None   Dressing Abdominal dressing;Enzymatic debriders;Gauze;Compression bandage   Dressing Changed New   Dressing Status Clean;Dry       Inactive Orders   Date Order Priority Status Authorizing Provider   08/05/25 0612 Inpatient Consult to Wound and Ostomy Nurse Routine Completed Brandon Holbrook MD     - Reason for Consult?:    Wound   05/31/25 1457 Change dressing 2 Wounds Associated Routine Discontinued Mikala Escoto MD   Patient awake , alert and oriented. States she see's her Podiatrist Dr. Ramos regularly  for foot /dressing care to left TMA site. States she saw him yesterday but  does not remember what dressing he recommended.  Wound to  left TMA incision. Wound bed pink, red with yellow slough. Periwound skin dry, but intact.   Recommendations:  - Left TMA incision, Clean with Vashe, Apply Santyl to slough on wound bed, cover with Adaptic, then DSD, ABD and wrap with kerlix,then ACE wrap. Change every other day.      Wound Plan: Wound team will not continue to follow. Please re-consult for worsening of wounds.       SUZANNE GERHARDT, RN, BSN, CWOCN  8/5/2025  3:54 PM

## 2025-08-05 NOTE — PROGRESS NOTES
Pharmacy Medication History Review    Nuris qSuires is a 60 y.o. female admitted for VENKAT (acute kidney injury). Pharmacy reviewed the patient's [id number] medications and allergies for accuracy.    Medications ADDED:  Aspirin 81 mg   Medications CHANGED:  None  Medications REMOVED:   None     Medications NOT TAKING:  Nephro-delvis    The list below reflects the updated PTA list.   Prior to Admission Medications   Prescriptions Last Dose Informant   B complex-vitamin C-folic acid (Nephro-Delvis) 0.8 mg tablet Not Taking Self   Sig: Take 1 tablet by mouth once daily.   Patient not taking: Reported on 8/5/2025   FreeStyle Remington 3 [place] misc  Self   Sig: Use as instructed to monitor blood glucose.   OneTouch Delica Plus Lancet 30 gauge misc  Self   Sig: USE TO TEST BLOOD SUGAR AS DIRECTED   OneTouch Ultra Test strip  Self   Sig: Use 1 strip BID to check glucose   OneTouch Ultra2 Meter misc  Self   Sig: use 1 to 2 times daily   acetaminophen (Tylenol) 325 mg tablet  Self   Sig: Take 2 tablets (650 mg) by mouth every 6 hours if needed (pain).   aspirin 81 mg EC tablet  Self   Sig: Take 1 tablet (81 mg) by mouth once daily.   atorvastatin (Lipitor) 40 mg tablet  Self   Sig: Take 1 tablet (40 mg) by mouth once daily.  Last filled 4/17/25 but patient claims it was filled with other meds on 7/30/25    blood-glucose sensor (FreeStyle Remington 3 Sensor) device  Self   Sig: Use to monitor blood glucose. Change sensor every 14 days.   carvedilol (Coreg) 12.5 mg tablet  Self   Sig: Take 1 tablet (12.5 mg) by mouth 2 times a day.   ferrous sulfate 325 mg (65 mg elemental) tablet  Self   Sig: Take 1 tablet by mouth once daily with breakfast.   gabapentin (Neurontin) 100 mg capsule  Self   Sig: Take 1 capsule (100 mg) by mouth once daily at bedtime.   glucagon 3 mg/actuation spray,non-aerosol     Sig: Administer 1 spray into affected nostril(s) every 15 minutes if needed (symptomatic low blood sugar <70 and unable to take orals).  "  hydrALAZINE (Apresoline) 50 mg tablet  Self   Sig: Take 1 tablet (50 mg) by mouth 3 times a day.   insulin glargine (Basaglar KwikPen U-100 Insulin) 100 unit/mL (3 mL) pen  Self   Sig: Inject 10 Units under the skin once daily in the morning. Take as directed per insulin instructions.   insulin lispro (Admelog SoloStar U-100 Insulin) 100 unit/mL pen More than a month Self   Sig: Inject per sliding scale instructions under the skin 3 times a day with meals and bedtime. 1 units for every 50 of glucose >150   isosorbide mononitrate ER (Imdur) 30 mg 24 hr tablet  Self   Sig: Take 1 tablet (30 mg) by mouth once daily. Do not crush or chew.   pen needle, diabetic 32 gauge x 5/32\" needle  Self   Sig: Use four times a day with insulin use   tamsulosin (Flomax) 0.4 mg 24 hr capsule  Self   Sig: Take 2 capsules (0.8 mg) by mouth once daily. Do not crush, chew, or split.   tirzepatide (Mounjaro) 5 mg/0.5 mL pen injector 8/1/2025 Self   Sig: Inject 5 mg under the skin 1 (one) time per week.   torsemide (Demadex) 20 mg tablet  Self   Sig: Take 2 tablets (40 mg) by mouth once daily.      Facility-Administered Medications: None        The list below reflects the updated allergy list. Please review each documented allergy for additional clarification and justification.  Allergies  Reviewed by Nader Pires on 8/5/2025   No Known Allergies         Patient accepts M2B at discharge.     Sources:   [facility]  Pharmacy dispense history  Patient Interview Moderate historian  Chart Review     Additional Comments:  [facility] report below:    Patient has not taken Admelog for over a month due to improved blood sugars.  Atorvastatin was last filled 4/17/25 but patient claims it was filled with other meds on 7/30/25   Clarified dose and frequency of medications with varying strengths in the fill history such as gabapentin and hydralazine  This patient appears to be adherent to their medication regimen.  Medication list from 7/31/25 office " "visit was referenced    ROJAS THOMPSON  Pharmacy Intern  08/05/25     Secure Chat preferred   If no response call [phone] or Vocera \"Med Rec\"   "

## 2025-08-05 NOTE — PROGRESS NOTES
Brief post-rounds addendum:    Patient seen and examined this morning. Her biggest concern is epigastric abdominal pain. She feels it is slightly worse with eating. It has been present for the past 5 days. She denies nausea or vomiting. She hasn't had a bowel movement in several days. She hasn't had pain like this before.     We discussed her labs, the possibility of HD initiation. She has an older sister who was on HD. She is very hopeful to avoid it but understands what goes into it.    Exam:  Well appearing, in no distress  CV RRR  Lungs clear  Abd diffuse tenderness in epigastric area, no rebound or guarding, normal bowel sounds    Plan:  Abdominal pain:  - Add Maalox PRN  - Start miralax daily  - Follow up KUB    UA with leukocytes/bacteria  Patient is asymptomatic; denies fevers, chills, dysuria or frequency  - Unfortunately can't add on urine culture. Will send another urinalysis with reflex to culture this morning but realize this will be pre-treated with antibiotics  - Continue cefepime for now    CKD Stage IV  Metabolic acidosis  - Consult nephrology    Yoly Bailon MD  Medicine/Pediatric Hospitalist

## 2025-08-05 NOTE — PROGRESS NOTES
Physical Therapy    Physical Therapy Evaluation    Patient Name: Nuris Squires  MRN: [id number]  Department: [facility] 3  Room: [address] [place] [address]  Today's Date: 8/5/2025   Time Calculation  Start Time: 0814  Stop Time: 0830  Time Calculation (min): 16 min    Assessment/Plan   PT Assessment  PT Assessment Results: Decreased strength, Decreased endurance, Impaired balance, Decreased mobility  Rehab Prognosis: Good  Barriers to Discharge Home: No anticipated barriers  End of Session Communication: Bedside nurse  Assessment Comment: Pt tolerated PT eval well. Presents with impairments in strength, balance and dec activity tolerance. Reports inc support at home with recent d/c from SNF. Pt would benefit from low intensity therapy upon d/c to address functional defiits and maximize independence. While in hospital will follow  End of Session Patient Position: Bed, 3 rail up, Alarm off, not on at start of session  IP OR SWING BED PT PLAN  Inpatient or Swing Bed: Inpatient  PT Plan  Treatment/Interventions: Bed mobility, Transfer training, Gait training, Stair training, Balance training, Strengthening, Endurance training, Therapeutic exercise, Therapeutic activity, Home exercise program, Positioning  PT Plan: Ongoing PT  PT Frequency: 3 times per week  PT Discharge Recommendations: Low intensity level of continued care  Equipment Recommended upon Discharge:  (n/a)  PT Recommended Transfer Status: Assist x1  PT - OK to Discharge: Yes (meaning PT eval complete and d/c recommendation made)    Subjective     PT Visit Info:  PT Received On: 08/05/25  General Visit Information:  General  Reason for Referral: presents to [facility] ED at the request due to worsening labs indicated renal dysfunction  Past Medical History Relevant to Rehab: CKD stage 4, DM, HTN, HFpEF, HLD, PAD, MO, multiple foot infections with L TMA in May 2025  Family/Caregiver Present: No  Prior to Session Communication: Bedside nurse  Patient Position Received: Bed, 3 rail up,  Alarm off, not on at start of session  General Comment: Pt pleasant and cooperative  Home Living:  Home Living  Type of Home: House  Lives With: Significant other (boyfriend able to assist, has HHA Mon-Fri 4hr per day)  Home Adaptive Equipment: Walker rolling or standard, Cane, Wheelchair-manual (Rollator)  Home Layout: One level  Home Access: Stairs to enter with rails  Entrance Stairs-Number of Steps: 4 (side door)  Bathroom Shower/Tub: Tub/shower unit  Bathroom Equipment: Tub transfer bench  Home Living Comments: Reports being at rehab until a couple of weeks prior to admission  Prior Level of Function:  Prior Function Per Pt/Caregiver Report  ADL Assistance:  (reports she is mostly indep but occasionally has assist from HHA for dressing, completes sponge bathing)  Homemaking Assistance: Needs assistance  Ambulatory Assistance:  (using wheelchair or FWW within house and rollator in the community)  Prior Function Comments: denies falls  Precautions:  Precautions  Medical Precautions: Fall precautions  Precautions Comment: Pt placed post op shoe to LLE        Objective   Pain:  Pain Assessment  Pain Assessment: 0-10  0-10 (Numeric) Pain Score: 0 - No pain  Cognition:  Cognition  Overall Cognitive Status: Within Functional Limits  Orientation Level: Oriented X4    General Assessment    Activity Tolerance  Endurance: Tolerates 10 - 20 min exercise with multiple rests         Static Sitting Balance  Static Sitting-Balance Support: Feet supported, No upper extremity supported  Static Sitting-Level of Assistance: Independent    Static Standing Balance  Static Standing-Balance Support: Bilateral upper extremity supported  Static Standing-Level of Assistance: Distant supervision  Static Standing-Comment/Number of Minutes: forward flexed posture  Functional Assessments:  Bed Mobility  Bed Mobility: Yes  Bed Mobility 1  Bed Mobility 1: Supine to sitting  Level of Assistance 1: Minimum assistance  Bed Mobility Comments 1:  slight assist to complete full trunk motion (pt reports she sleeps in recliner)  Bed Mobility 2  Bed Mobility  2: Sitting to supine  Level of Assistance 2: Minimum assistance  Bed Mobility Comments 2: to complete LE motion into bed (pt sleeps in recliner)    Transfers  Transfer: Yes  Transfer 1  Transfer From 1: Sit to, Stand to  Transfer to 1: Stand, Sit  Technique 1: Sit to stand, Stand to sit  Transfer Device 1: Walker  Transfer Level of Assistance 1: Close supervision    Ambulation/Gait Training  Ambulation/Gait Training Performed: Yes  Ambulation/Gait Training 1  Surface 1: Level tile  Device 1: Rolling walker  Assistance 1: Close supervision  Quality of Gait 1: Diminished heel strike, Decreased step length, Forward flexed posture, Wide base of support (slow gait speed with dec step length bilaterally)  Comments/Distance (ft) 1: 20ft    Stairs  Stairs: No  Extremity/Trunk Assessments:  RLE   RLE : Within Functional Limits  LLE   LLE : Within Functional Limits  Outcome Measures:  [facility] Basic Mobility  Turning from your back to your side while in a flat bed without using bedrails: A little  Moving from lying on your back to sitting on the side of a flat bed without using bedrails: A little  Moving to and from bed to chair (including a wheelchair): A little  Standing up from a chair using your arms (e.g. wheelchair or bedside chair): A little  To walk in hospital room: A little  Climbing 3-5 steps with railing: A little  Basic Mobility - Total Score: 18    Encounter Problems       Encounter Problems (Active)       PT Problem       Pt will perform sit<>stand with FWW mod I (Progressing)       Start:  08/05/25    Expected End:  08/19/25            Pt will amb 100ft with FWW mod I (Progressing)       Start:  08/05/25    Expected End:  08/19/25            Pt will ascend/descend 4 stairs with railing mod I (Progressing)       Start:  08/05/25    Expected End:  08/19/25            Pt will perform dynamic reaching, in  standing, x10 reps without LOB (Progressing)       Start:  08/05/25    Expected End:  08/19/25                   Education Documentation  Precautions, taught by Evie Tim PT at 8/5/2025  9:28 AM.  Learner: Patient  Readiness: Acceptance  Method: Explanation  Response: Verbalizes Understanding  Comment: PT POC, fall precautions    Mobility Training, taught by Evie Tim PT at 8/5/2025  9:28 AM.  Learner: Patient  Readiness: Acceptance  Method: Explanation  Response: Verbalizes Understanding  Comment: PT POC, fall precautions    Education Comments  No comments found.

## 2025-08-05 NOTE — PROGRESS NOTES
Occupational Therapy    Evaluation    Patient Name: Nuris Squires  MRN: [id number]  Today's Date: 8/5/2025  Room: [address] [place] [address]  Time Calculation  Start Time: 0954  Stop Time: 1005  Time Calculation (min): 11 min    Assessment  IP OT Assessment  OT Assessment: Pt presents close to functional baseline, however w/ decreased activity tolerance resulting in increased need for assist w/ I/ADLs and the continued need for skilled OT services during this IP stay.  Prognosis: Good  Barriers to Discharge Home: No anticipated barriers  Evaluation/Treatment Tolerance: Patient tolerated treatment well  Medical Staff Made Aware: Yes  End of Session Communication: Bedside nurse  End of Session Patient Position: Bed, 3 rail up, Alarm off, not on at start of session  Plan:  Inpatient Plan  Treatment Interventions: ADL retraining, Functional transfer training, UE strengthening/ROM, Endurance training, Equipment evaluation/education, Compensatory technique education  OT Frequency: 1 time per week  OT Discharge Recommendations: No OT needed after discharge  Equipment Recommended upon Discharge:  (Owns)  OT Recommended Transfer Status: Stand by assist  OT - OK to Discharge: Yes  OT Assessment  OT Assessment Results: Decreased ADL status, Decreased endurance, Decreased functional mobility, Decreased IADLs  Prognosis: Good  Barriers to Discharge: None  Evaluation/Treatment Tolerance: Patient tolerated treatment well  Medical Staff Made Aware: Yes  Strengths: Ability to acquire knowledge, Coping skills, Housing layout, Support of Caregivers  Barriers to Participation: Comorbidities    Subjective   Current Problem:  1. VENKAT (acute kidney injury)        2. UTI (urinary tract infection), uncomplicated          General:  Reason for Referral: This 59 y/o F presented to ED 8/4 w/ abnormal labs (elevated BUN and creatinine), abd pain, and urinary complaints. UA suspicious for UTI. Admitted for potential initiation of HD, observation, and workup of  acidosis  Past Medical History Relevant to Rehab: CKD IV (not on HD), urinary retention, DM, HTN, HFpEF, HLD, PAD, MO, multiple foot infections, ADHF  Prior to Session Communication: Bedside nurse  Patient Position Received: Bed, 3 rail up, Alarm off, not on at start of session  Family/Caregiver Present: No  General Comment: Pt sup in bed on approach. Pleasant and agreeable to OT assessment.   Precautions:  Medical Precautions: Fall precautions  Pain:  Pain Assessment  Pain Assessment: 0-10  0-10 (Numeric) Pain Score: 0 - No pain      Objective   Cognition:  Overall Cognitive Status: Within Functional Limits  Orientation Level: Oriented X4  Home Living:  Type of Home: House  Lives With: Significant other (boyfriend)  Home Adaptive Equipment: Walker rolling or standard, Cane, Wheelchair-manual (FWW and rollator)  Home Layout: One level  Home Access: Stairs to enter with rails  Entrance Stairs-Rails: Right  Entrance Stairs-Number of Steps: 4  Bathroom Shower/Tub: Tub/shower unit  Bathroom Equipment: Tub transfer bench  Home Living Comments: HHA 4hr/day x5d/wk   Prior Function:  Level of [place]: Independent with ADLs and functional transfers, Needs assistance with homemaking  Receives Help From: Home health, Family  ADL Assistance: Independent (Reports occasional assist from HHA for dressing; sponge bathes)  Homemaking Assistance: Needs assistance  Ambulatory Assistance: Independent (w/c in home; Rollator in community)  Vocational: Unemployed  Leisure: dog  Hand Dominance: Right  Prior Function Comments: (-) Drives; (-) Falls  ADL:  Eating Assistance: Independent  Grooming Assistance: Stand by  Bathing Assistance: Stand by  UE Dressing Assistance: Independent  LE Dressing Assistance: Stand by  Toileting Assistance with Device: Stand by  Activity Tolerance:  Endurance: Tolerates 10 - 20 min exercise with multiple rests  Bed Mobility/Transfers: Bed Mobility 1  Bed Mobility 1: Supine to sitting  Level of Assistance  1: Distant supervision  Bed Mobility Comments 1: HOB elevated  Bed Mobility 2  Bed Mobility  2: Sitting to supine  Level of Assistance 2: Minimum assistance  Bed Mobility Comments 2: Assist w/ BLE  Functional Mobility  Functional Mobility Performed: Yes  Functional Mobility 1  Surface 1: Level tile  Device 1: No device  Assistance 1: Close supervision  Comments 1: Side steps at EOB   and Transfer 1  Technique 1: Sit to stand  Transfer Device 1:  (no AD)  Transfer Level of Assistance 1: Close supervision  Vision: Vision - Basic Assessment  Current Vision: Wears glasses only for reading  Sensation:  Light Touch: Partial deficits in the RUE, Partial deficits in the LUE (N&T b/l hands)  Coordination:  Movements are Fluid and Coordinated: Yes   Hand Function:  Hand Function  Gross Grasp: Functional  Coordination: Functional  Extremities:  ,  , RLE   RLE : Within Functional Limits, and LLE   LLE : Within Functional Limits    Outcome Measures: [facility] Daily Activity  Putting on and taking off regular lower body clothing: A little  Bathing (including washing, rinsing, drying): A little  Putting on and taking off regular upper body clothing: None  Toileting, which includes using toilet, bedpan or urinal: A little  Taking care of personal grooming such as brushing teeth: None  Eating Meals: None  Daily Activity - Total Score: 21         ,     OT Adult Other Outcome Measures  4AT: Negative    Education Documentation  Body Mechanics, taught by Suzanne Whelan OT at 8/5/2025 12:16 PM.  Learner: Patient  Readiness: Acceptance  Method: Explanation  Response: Verbalizes Understanding    Precautions, taught by Suzanne Whelan OT at 8/5/2025 12:16 PM.  Learner: Patient  Readiness: Acceptance  Method: Explanation  Response: Verbalizes Understanding    ADL Training, taught by Suzanne Whelan OT at 8/5/2025 12:16 PM.  Learner: Patient  Readiness: Acceptance  Method: Explanation  Response: Verbalizes Understanding    Education Comments  No  comments found.        Goals:     Encounter Problems       Encounter Problems (Active)       ADLs       Patient will perform UB and LB bathing with modified independent level of assistance and shower chair.       Start:  08/05/25    Expected End:  08/19/25            Patient with complete lower body dressing with modified independent level of assistance donning and doffing all LE clothes  with PRN adaptive equipment while edge of bed        Start:  08/05/25    Expected End:  08/19/25            Patient will complete toileting including hygiene clothing management/hygiene with modified independent level of assistance and raised toilet seat.       Start:  08/05/25    Expected End:  08/19/25               MOBILITY       Patient will perform Functional mobility mod  Household distances/Community Distances with modified independent level of assistance and least restrictive device in order to improve safety and functional mobility.       Start:  08/05/25    Expected End:  08/19/25               TRANSFERS       Patient will perform bed mobility modified independent level of assistance and bed rails in order to improve safety and independence with mobility       Start:  08/05/25    Expected End:  08/19/25            Patient will complete functional transfer to chair/toilet with least restrictive device with modified independent level of assistance.       Start:  08/05/25    Expected End:  08/19/25 08/05/25 at 12:16 PM   CADEN FLETCHER, OT   Rehab Office: [phone]

## 2025-08-05 NOTE — CONSULTS
"NEPHROLOGY NEW CONSULT NOTE   Nuris Squires   60 y.o.    @WT@  MRN/Room: [id number]    Reason for consult: \"worsening renal function, sent in by outpatient nephrologist\"    HPI:  Nuris Squires is a 60 y.o. female with a past medical hx of  CKD stage 4, DM, HTN, HFpEF, HLD, PAD, MO, multiple foot infections, and ADHF who initially presented due to worsening labs indicating renal dysfunction. Nephrology consulted for worsening renal function.     Per chart review and patient report, patient has had multiple recent admissions for foot infections and VENKAT. She was last admitted in June 2025 for amputation of her left 4th and 5th digits in her feet. During this admission, her creatinine jumped from 2.46 on admission to 7. It was thought to be due to vanc toxicity. Her non-oliguric VENKAT on CKD4 at that time improved to Cr 4.51 with supportive care and she was discharged with renal follow up.     At her renal outpatient follow up, she underwent lab testing that showed her Cr had increased to 5.17 and her BUN had increased to 119. Her outpatient nephrologist recommend that she go into the hospital for treatment due to her abnormal lab values.     Patient reports that she started having epigastric abdominal pain beginning 4 days prior to admission and that has been continuous since onset. She reports that she hasn't had any changes in bowel or urination regimens, though she hasn't been eating as much due to the pain. She denies any lower extremity swelling while at home. Per chart review, patient reports that she takes all of her home medications but doesn't take the nighttime doses.     In The ER: /51   Pulse 74   Temp 36.6 °C (97.9 °F)   Resp 17   Ht 1.575 m (5' 2\")   Wt 89.4 kg (197 lb)   SpO2 97%   BMI 36.03 kg/m²      Medical History[1]   Surgical History[2]   Family History[3]  Social History     Socioeconomic History    Marital status: Single     Spouse name: Not on file    Number of children: Not " on file    Years of education: Not on file    Highest education level: Not on file   Occupational History    Not on file   Tobacco Use    Smoking status: Never     Passive exposure: Never    Smokeless tobacco: Never   Vaping Use    Vaping status: Never Used   Substance and Sexual Activity    Alcohol use: Yes    Drug use: Never    Sexual activity: Defer   Other Topics Concern    Not on file   Social History Narrative    Not on file     Social Drivers of Health     Financial Resource Strain: Low Risk  (8/5/2025)    Overall Financial Resource Strain (CARDIA)     Difficulty of Paying Living Expenses: Not hard at all   Food Insecurity: Patient Declined (8/5/2025)    Hunger Vital Sign     Worried About Running Out of Food in the Last Year: Patient declined     Ran Out of Food in the Last Year: Patient declined   Transportation Needs: No Transportation Needs (8/5/2025)    PRAPARE - Transportation     Lack of Transportation (Medical): No     Lack of Transportation (Non-Medical): No   Physical Activity: Inactive (5/24/2025)    Exercise Vital Sign     Days of Exercise per Week: 0 days     Minutes of Exercise per Session: 0 min   Stress: Patient Declined (5/24/2025)    [employer] - Occupational Stress Questionnaire     Feeling of Stress : Patient declined   Social Connections: Patient Declined (5/24/2025)    Social Connection and Isolation Panel     Frequency of Communication with Friends and Family: Patient declined     Frequency of Social Gatherings with Friends and Family: Patient declined     Attends Worship Services: Patient declined     Active Member of Clubs or Organizations: Patient declined     Attends Club or Organization Meetings: Patient declined     Marital Status: Patient declined   Intimate Partner Violence: Patient Declined (8/5/2025)    Humiliation, Afraid, Rape, and Kick questionnaire     Fear of Current or Ex-Partner: Patient declined     Emotionally Abused: Patient declined      Physically Abused: Patient declined     Sexually Abused: Patient declined   Housing Stability: Low Risk  (8/5/2025)    Housing Stability Vital Sign     Unable to Pay for Housing in the Last Year: No     Number of Times Moved in the Last Year: 1     Homeless in the Last Year: No       Allergies[4]     Prescriptions Prior to Admission[5]     Meds:   atorvastatin, 40 mg, Daily  carvedilol, 12.5 mg, BID  cefepime, 2 g, q24h  ferrous sulfate, 65 mg of elemental iron, Daily with breakfast  [Held by provider] gabapentin, 100 mg, Nightly  heparin (porcine), 5,000 Units, q8h  hydrALAZINE, 50 mg, TID  insulin lispro, 0-5 Units, TID AC  isosorbide mononitrate ER, 30 mg, Daily  pantoprazole, 40 mg, Daily before breakfast  tamsulosin, 0.8 mg, Daily  torsemide, 40 mg, Daily         acetaminophen, 650 mg, q6h PRN  dextrose, 12.5 g, q15 min PRN  dextrose, 25 g, q15 min PRN  glucagon, 1 mg, q15 min PRN  glucagon, 1 mg, q15 min PRN        Vitals:    08/05/25 0312   BP: 123/51   Pulse: 74   Resp: 17   Temp: 36.6 °C (97.9 °F)   SpO2: 97%        08/03 1900 - 08/05 0659  In: -   Out: 430 [Urine:430]   Weight change:      General appearance: AAOx3. No distress  Eyes: non-icteric  Skin: no apparent rash  Heart: regular rate and rhythm. No rub  Lungs: CTA bilat.  No wheezing/crackles  Abdomen: soft, nt/nd  Extremities: trace edema bilat  : No Louis  Neuro: No FND, No asterixis   ACCESS: None    Lab Results   Component Value Date    GLUCOSE 106 (H) 08/05/2025    CALCIUM 9.0 08/05/2025     08/05/2025    K 4.2 08/05/2025    CO2 18 (L) 08/05/2025     (H) 08/05/2025    BUN 96 (HH) 08/05/2025    CREATININE 4.19 (H) 08/05/2025       Lab Results   Component Value Date    WBC 6.1 08/05/2025    HGB 9.7 (L) 08/05/2025    HCT 30.7 (L) 08/05/2025    MCV 96 08/05/2025     08/05/2025       Imaging     US renal 5/31/25  Bilateral increased renal cortical echogenicity can be seen the setting of medical renal  "disease.    ASSESSMENT:  Nuris Squires is a  60 y.o.  Year old , with PMHx of CKD stage 4, DM, HTN, HFpEF, HLD, PAD, MO, multiple foot infections, and ADHF who was admitted for worsening kidney function. Patients lab values have been improving while inpatient with supportive care and diuresis. Differential at this time includes decreased kidney perfusion vs intrinsic renal pathology of kidney injury. Nephrology will continue to monitor.     Kidney   - VENKAT on CKD?, nonoliguric  - Baseline creatinine: 2.4   - Etiology: Diabetes   - UA showed:  1+ protein, trace blood, negative ketones, negative nitrites, + leukocyte esterases   - Clinical volume status: hypervolemic  - Electrolytes (Na, K, Ca, Phos) stable  - Acid base status: Bicarb 18, st    Obstruction unlikely -   -  Renal USG/CT abdomen with \"Bilateral increased renal cortical echogenicity can be seen the setting of medical renal disease.\"      Recommendations:  - Indication for dialysis:  No   - Avoid nephrotoxins, contrast if possible  - strict Is/Os  - Renal dosing for medications for latest eGFR, follow medication trough as appropriate  - Avoid hypotension/rapid fluctuations in Bps  - Clinically improving with supportive care. Will continue to monitor.     Kobe Hermosillo MD  24 hour Renal Pager - [phone]    Discussed with attending nephrologist          [1]   Past Medical History:  Diagnosis Date    CKD (chronic kidney disease)     Hyperlipidemia     Hypertension     Hypertensive heart and kidney disease with HF and with CKD stage IV 01/06/2025    Personal history of other diseases of the circulatory system     History of hypertension    Personal history of other endocrine, nutritional and metabolic disease     History of hyperlipidemia    Personal history of other endocrine, nutritional and metabolic disease     History of diabetes mellitus   [2]   Past Surgical History:  Procedure Laterality Date    CARDIAC CATHETERIZATION N/A 1/8/2025    Procedure: Right " Heart Cath;  Surgeon: Migel Stanton MD;  Location: [place] [address] Cardiac [facility];  Service: Cardiovascular;  Laterality: N/A;    CARDIAC CATHETERIZATION N/A 1/17/2025    Procedure: Right Heart Cath;  Surgeon: Ana Lea MD;  Location: [place] [address] Cardiac Cath Lab;  Service: Cardiovascular;  Laterality: N/A;    CT ANGIO NECK  1/25/2023    CT NECK ANGIO W AND WO IV CONTRAST 1/25/2023 [facility]    CT HEAD ANGIO W AND WO IV CONTRAST  1/25/2023    CT HEAD ANGIO W AND WO IV CONTRAST 1/25/2023 [facility]CTOR OFF[facility]    OTHER SURGICAL HISTORY  10/21/2020    Toe amputation   [3]   Family History  Problem Relation Name Age of Onset    Alzheimer's disease Mother      Diabetes Mother      Lung cancer Mother      Diabetes Father      Lung cancer Father      Pancreatic cancer Father      Diabetes Sister      Lung cancer Sister     [4] No Known Allergies  [5]   Medications Prior to Admission   Medication Sig Dispense Refill Last Dose/Taking    acetaminophen (Tylenol) 325 mg tablet Take 2 tablets (650 mg) by mouth every 6 hours if needed (pain).   Past Week    atorvastatin (Lipitor) 40 mg tablet Take 1 tablet (40 mg) by mouth once daily. 30 tablet 1 8/4/2025    carvedilol (Coreg) 12.5 mg tablet Take 1 tablet (12.5 mg) by mouth 2 times a day. 60 tablet 1 8/4/2025    ferrous sulfate 325 mg (65 mg elemental) tablet Take 1 tablet by mouth once daily with breakfast.   8/4/2025    gabapentin (Neurontin) 100 mg capsule Take 1 capsule (100 mg) by mouth once daily at bedtime.   8/3/2025    hydrALAZINE (Apresoline) 50 mg tablet Take 1 tablet (50 mg) by mouth 3 times a day. 270 tablet 3 8/4/2025    insulin glargine (Basaglar KwikPen U-100 Insulin) 100 unit/mL (3 mL) pen Inject 10 Units under the skin once daily in the morning. Take as directed per insulin instructions.   8/4/2025    insulin lispro (Admelog SoloStar U-100 Insulin) 100 unit/mL pen Inject per sliding scale instructions under the skin 3 times a day with meals  "and bedtime. 1 units for every 50 of glucose >150   Past Week    isosorbide mononitrate ER (Imdur) 30 mg 24 hr tablet Take 1 tablet (30 mg) by mouth once daily. Do not crush or chew. 30 tablet 3 8/4/2025    OneTouch Delica Plus Lancet 30 gauge misc USE TO TEST BLOOD SUGAR AS DIRECTED 200 each 0 8/4/2025    OneTouch Ultra Test strip Use 1 strip BID to check glucose 200 strip 1 8/4/2025    OneTouch Ultra2 Meter misc use 1 to 2 times daily 1 each 0 8/4/2025    pen needle, diabetic 32 gauge x 5/32\" needle Use four times a day with insulin use 360 each 1 8/4/2025    tirzepatide (Mounjaro) 5 mg/0.5 mL pen injector Inject 5 mg under the skin 1 (one) time per week. 2 mL 1 8/1/2025    torsemide (Demadex) 20 mg tablet Take 2 tablets (40 mg) by mouth once daily. 180 tablet 1 8/4/2025    B complex-vitamin C-folic acid (Nephro-Duke) 0.8 mg tablet Take 1 tablet by mouth once daily. 90 tablet 0 Unknown    blood-glucose sensor (FreeStyle Remington 3 Sensor) device Use to monitor blood glucose. Change sensor every 14 days. 2 each 11     FreeStyle Remington 3 [place] misc Use as instructed to monitor blood glucose. 1 each 0     glucagon 3 mg/actuation spray,non-aerosol Administer 1 spray into affected nostril(s) every 15 minutes if needed (symptomatic low blood sugar <70 and unable to take orals).       tamsulosin (Flomax) 0.4 mg 24 hr capsule Take 2 capsules (0.8 mg) by mouth once daily. Do not crush, chew, or split.        "

## 2025-08-05 NOTE — H&P
"  History Of Present Illness  Nuris Squires is a 60 year old female with a past medical history CKD stage 4, DM, HTN, HFpEF, HLD, PAD, MO, multiple foot infections, multiple hospital admissions over past 18 months for infections and ADHF frequently with associated VENKAT on CKD who presents to [facility] ED at the request due to worsening labs indicated renal dysfunction. She is admitted to medicine for potential initiation of hemodialysis, observation, and workup of acidosis.     Today, she is feeling generally well. She has been urinating every 4-5 hours, no pain or burning with urination. Does note that for the past few weeks has had less of an appetite, eats one meal per day but still drinks ~48oz of water along with sodas per day, but does agree she may be consuming less due to abdominal pain. She has epigastric, subxiphoid pain which is more tender to palpation. She reports this pain has been at a ~5/10 for a few weeks, and comes up to a 7/10 on palpation or when she eats/drinks. She reports no radiation of pain or vomiting/constipation. Denies hematochezia, hemoptysis, or hematemesis. She verified her home medications but does state that she did not take any of her evening doses.  Denies F/C/NVD, has chronic peripheral neruopathy which has been stable. Also has noticed a metallic taste in her mouth from time to time ~3x per week for the past few weeks. She reports no pain in her LLE surgery site and reports that podiatry changed the dressing today. She has no other concerns.     ROS per HPI, otherwise reviewed and negative.     ED Course:  /84 (BP Location: Right arm, Patient Position: Sitting)   Pulse 73   Temp 36.6 °C (97.9 °F) (Temporal)   Resp 16   Ht 1.575 m (5' 2\")   Wt 89.4 kg (197 lb)   SpO2 98%   BMI 36.03 kg/m²      ED course:  CMP significant for BUN 99, Cr 4.18, Bicarb 15, Cl 15, AG 16, LFTs WNL   Hgb 10.9 without leukocytosis, anemia is at baseline  VBG 7.28/36/40/16.9 with POC lactate of " 0.8  UA concerning for UTI  Started on cefepime  , trop 10  UA significant for 2+ protein, trace blood, + leukocyte esterase and turbid appearance  Started on cefepime for UTI  Admission to medicine for further management and Nephrology consult    Labs  Results from last 7 days   Lab Units 08/04/25  1806 08/01/25  1248   SODIUM mmol/L 141 141   POTASSIUM mmol/L 4.3 4.6   CHLORIDE mmol/L 114* 114*   CO2 mmol/L 15* 16*   BUN mg/dL 99* 119*   CREATININE mg/dL 4.18* 5.17*   GLUCOSE mg/dL 165* 101*   CALCIUM mg/dL 9.5 8.6      Results from last 7 days   Lab Units 08/04/25  1806 08/01/25  1248   WBC AUTO x10*3/uL 6.3 6.5   HEMOGLOBIN g/dL 10.9* 9.5*   HEMATOCRIT % 31.8* 30.0*   PLATELETS AUTO x10*3/uL 225 207     Results from last 7 days   Lab Units 08/04/25  1806   AST U/L 23   ALT U/L 42   ALK PHOS U/L 101   BILIRUBIN TOTAL mg/dL 0.4   PROTEIN TOTAL g/dL 7.9             Imaging  No results found.    Cardiology, Vascular, and Other Imaging  - Reviewed by myself ECG NSR, no SISSY, similar to previous    Interventions:    ED Medication Administration from 08/04/2025 1250 to 08/04/2025 2357         Date/Time Order Dose Route Action Action by     08/04/2025 2149 EDT cefepime (Maxipime) 2 g in dextrose 5% IV 50 mL 2 g intravenous New Bag EKATERINA Jeff     08/04/2025 2208 EDT cefepime (Maxipime) 2 g in dextrose 5% IV 50 mL 0 g intravenous EKATERINA Avila             Past Medical History  She has a past medical history of CKD (chronic kidney disease), Hyperlipidemia, Hypertension, Hypertensive heart and kidney disease with HF and with CKD stage IV (01/06/2025), Personal history of other diseases of the circulatory system, Personal history of other endocrine, nutritional and metabolic disease, and Personal history of other endocrine, nutritional and metabolic disease.    Surgical History  She has a past surgical history that includes Other surgical history (10/21/2020); CT angio head w and wo IV contrast (1/25/2023); CT  angio neck (1/25/2023); Cardiac catheterization (N/A, 1/8/2025); and Cardiac catheterization (N/A, 1/17/2025).     Social History  She reports that she has never smoked. She has never been exposed to tobacco smoke. She has never used smokeless tobacco. She reports current alcohol use. She reports that she does not use drugs.    Family History  Family History[1]     Allergies  Patient has no known allergies.     Physical Exam  Physical exam:  Constitutional: No acute distress, patient comfortable appearing  HEENT: NCAT  Respiratory: Lungs CTAB  Cardiovascular: Normal rate, regular rhythm, no murmurs appreciated  Gastrointestinal: Soft,, non-distended, +BS, winces when epigastric region along xiphoid palpated  Musculoskeletal: No joint swelling, no deformity, moves all 4 extremities, bandage and rigid shoe on R foot, no bleeding evident  Integumentary: Intact, warm, dry no rashes  Neurological: alert, no focal deficits, MAEx4, nonencephalopathic     Last Recorded Vitals  /84 (BP Location: Right arm, Patient Position: Sitting)   Pulse 73   Temp 36.6 °C (97.9 °F) (Temporal)   Resp 16   Wt 89.4 kg (197 lb)   SpO2 98%     Medications    Current Outpatient Medications   Medication Instructions    acetaminophen (TYLENOL) 650 mg, oral, Every 6 hours PRN    atorvastatin (LIPITOR) 40 mg, oral, Daily    B complex-vitamin C-folic acid (Nephro-Duke) 0.8 mg tablet 1 tablet, oral, Daily    Basaglar KwikPen U-100 Insulin 10 Units, subcutaneous, Every morning, Take as directed per insulin instructions.    blood-glucose sensor (FreeStyle [place] 3 Sensor) device Use to monitor blood glucose. Change sensor every 14 days.    carvedilol (COREG) 12.5 mg, oral, 2 times daily    ferrous sulfate 325 mg (65 mg elemental) tablet 1 tablet, oral, Daily with breakfast    FreeStyle [place] 3 [place] misc Use as instructed to monitor blood glucose.    gabapentin (NEURONTIN) 100 mg, Nightly    glucagon 3 mg/actuation spray,non-aerosol 1 spray,  "nasal, Every 15 min PRN    hydrALAZINE (APRESOLINE) 50 mg, oral, 3 times daily    insulin lispro (Admelog SoloStar U-100 Insulin) 100 unit/mL pen Inject per sliding scale instructions under the skin 3 times a day with meals and bedtime. 1 units for every 50 of glucose >150    isosorbide mononitrate ER (IMDUR) 30 mg, oral, Daily, Do not crush or chew.    Mounjaro 5 mg, subcutaneous, Weekly    OneTouch Delica Plus Lancet 30 gauge misc USE TO TEST BLOOD SUGAR AS DIRECTED    OneTouch Ultra Test strip Use 1 strip BID to check glucose    OneTouch Ultra2 Meter misc use 1 to 2 times daily    pen needle, diabetic 32 gauge x 5/32\" needle Use four times a day with insulin use    tamsulosin (FLOMAX) 0.8 mg, oral, Daily, Do not crush, chew, or split.    torsemide (DEMADEX) 40 mg, oral, Daily        Scheduled/Continuous Medications:  PRN Medications:    Scheduled Medications[2]    Continuous Medications[3] PRN Medications[4]           Labs  CBC RFP   Lab Results   Component Value Date    WBC 6.3 08/04/2025    HGB 10.9 (L) 08/04/2025    HCT 31.8 (L) 08/04/2025    MCV 89 08/04/2025     08/04/2025    NEUTROABS 3.16 08/04/2025    Lab Results   Component Value Date     08/04/2025    K 4.3 08/04/2025     (H) 08/04/2025    CO2 15 (L) 08/04/2025    BUN 99 (HH) 08/04/2025    CREATININE 4.18 (H) 08/04/2025    CREATININE 5.17 (H) 08/01/2025     Lab Results   Component Value Date    MG 2.90 (H) 01/22/2025    PHOS 4.8 08/01/2025    CALCIUM 9.5 08/04/2025         Hepatic Function ABG/VBG   Lab Results   Component Value Date    ALT 42 08/04/2025    AST 23 08/04/2025    ALKPHOS 101 08/04/2025     Lab Results   Component Value Date    BILIDIR 0.1 04/02/2019      Lab Results   Component Value Date    PROTIME 13.3 (H) 05/24/2025    APTT 31 05/24/2025    INR 1.2 (H) 05/24/2025    Lab Results   Component Value Date    LACTATE 1.3 10/09/2020        Micro/culture data:  Susceptibility data from last 90 days.  Collected Specimen Info " Organism Amoxicillin/Clavulanate Ampicillin Ampicillin/Sulbactam Cefazolin Ceftriaxone Ciprofloxacin Clindamycin Daptomycin Erythromycin Gentamicin Gentamicin Synergy   05/30/25 Fluid from Intra-abdominal Abscess Enterococcus faecium   R       SDD    S     Candida albicans              05/25/25 Swab from SOFT TISSUE RESECTION Methicillin Susceptible Staphylococcus aureus (MSSA)        S   S       Proteus mirabilis  S  R  S  I  S  R     S      Mixed Skin Microorganisms                 Collected Specimen Info Organism Levofloxacin Linezolid Oxacillin Penicillin Piperacillin/Tazobactam Tetracycline Trimethoprim/Sulfamethoxazole Vancomycin   05/30/25 Fluid from Intra-abdominal Abscess Enterococcus faecium   S   R    R  R     Candida albicans           05/25/25 Swab from SOFT TISSUE RESECTION Methicillin Susceptible Staphylococcus aureus (MSSA)    S    S  S  S     Proteus mirabilis  R     S  R  R      Mixed Skin Microorganisms                -EKG:   Encounter Date: 08/04/25   ECG 12 lead   Result Value    Ventricular Rate 72    Atrial Rate 72    MN Interval 124    QRS Duration 84    QT Interval 382    QTC Calculation(Bazett) 418    P Axis 56    R Axis 159    T Axis 29    QRS Count 12    Q Onset 218    P Onset 156    P Offset 210    T Offset 409    QTC Fredericia 406    Narrative    Normal sinus rhythm  Right axis deviation  Low voltage QRS  Cannot rule out Anterior infarct (cited on or before 15-ISAK-2024)  Abnormal ECG  When compared with ECG of 24-MAY-2025 18:29,  No significant change was found    See ED provider note for full interpretation and clinical correlation  Confirmed by Kaye Jay ([id number]) on 8/4/2025 7:59:03 PM       Imaging  Imaging  No results found.    Cardiology, Vascular, and Other Imaging  ECG 12 lead  Result Date: 8/4/2025  Normal sinus rhythm Right axis deviation Low voltage QRS Cannot rule out Anterior infarct (cited on or before 15-ISAK-2024) Abnormal ECG When compared with ECG of 24-MAY-2025  18:29, No significant change was found See ED provider note for full interpretation and clinical correlation Confirmed by Kaye Jay ([id number]) on 8/4/2025 7:59:03 PM         -TTE:   1/02/2025:  CONCLUSIONS:   1. Left ventricular ejection fraction is normal, calculated by Argueta's biplane at 69%.   2. Spectral Doppler shows a Grade II (pseudonormal pattern) of left ventricular diastolic filling with an elevated left atrial pressure.   3. There is normal right ventricular global systolic function.   4. The left atrium is moderately dilated.   5. Moderate mitral valve regurgitation.   6. Mechanism of MR Is possibly restricted motion of the posterior leaflet.   7. Moderate tricuspid regurgitation visualized.   8. Moderately elevated right ventricular systolic pressure.   9. Small pericardial effusion.  10. Compared with study dated 1/25/2024, the degree of MR appears moderate today rather than mild on prior study. TR is also worsened.      Assessment/Plan     60 year old female with a past medical history CKD stage 4, DM, HTN, HFpEF, HLD, PAD, MO, multiple foot infections, multiple hospital admissions over past 18mths for infections and ADHF frequently with associated VENKAT on CKD who presents to [facility] ED at the request due to worsening labs indicated renal dysfunction. Review of her history demonstrates a history of progressively worsening CKD with a recent worsening in labs, with BUN now hovering around ~100. She has not had a significant change in symptoms, but the occasional metallic taste in her mouth is potentially a symptom of elevated BUN - but this is a nonspecific symptom. The patient's primary symptomatic concern at this time is her abdominal pain. The pain increases with PO intake and palpation, and she has not noted any hematochezia, hemoptysis, or hematemesis at this time. It is reasonable to monitor these symptoms and plan for nephrology evaluation in AM.     #?VENKAT on CKD vs progression of CKD  #NAGMA  with respiratory compensation  ::Prior VENKAT on last admission, Vanco assoc. 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   -PT/OT, mobilize pt as tolerated  -Reports pain is controlled  -Wound care c/s in AM    #DMII  - glucose controlled today, she says ~100 at mealtime  - on lispro SSI, continue mild SSI  - Hold basal on day 1 of admission given good glucose control, hospitalization and reduced PO intake    #HFpEF  #HTN  - euvolemic exam  - continue carvedilol, imdur, torsemide, no change to home medictions   - Echocardiogram in AM, BNP elevation in ED    F:PRN  E:PRN  N:Renal Diet  GI: PPI  DVT: Heparin  Code: Full Code, Confirmed on Admission - TIM Rousseau [phone]    Brandon Holbrook MD  Internal Medicine         [1]   Family History  Problem Relation Name Age of Onset    Alzheimer's disease Mother      Diabetes Mother      Lung cancer Mother      Diabetes Father      Lung cancer Father      Pancreatic cancer Father      Diabetes Sister      Lung cancer Sister     [2] atorvastatin, 40 mg, oral, Daily  carvedilol, 12.5 mg, oral, BID  cefepime, 2 g, intravenous, q24h  ferrous sulfate, 65 mg of elemental iron, oral, Daily with breakfast  [Held by  provider] gabapentin, 100 mg, oral, Nightly  heparin (porcine), 5,000 Units, subcutaneous, q8h  hydrALAZINE, 50 mg, oral, TID  isosorbide mononitrate ER, 30 mg, oral, Daily  pantoprazole, 40 mg, oral, Daily before breakfast  tamsulosin, 0.8 mg, oral, Daily  torsemide, 40 mg, oral, Daily  [3]    [4] PRN medications: acetaminophen

## 2025-08-05 NOTE — PROGRESS NOTES
08/05/25 1104   Rapid Rounds   Attendance Provider;Care Transitions   Expected Discharge Disposition Home H   Today we still await: Clinical stability   Review at Escalation Rounds No escalation needed     Transitional Care Coordinator Progress Note:   Pt with VENKAT on CKD, and acidosis, plan to consult nephrology and monitor daily RFPs. ADOD is 8/7.  PT is recommending low intensity and OT recommends home no needs.  Plan to discuss therapy recommendations with pt and complete assessment.  Care coordinator will continue to follow for discharge planning needs.     Nina Warren MSN, RN-BC  Transitional Care Coordinator (TCC)  [phone]

## 2025-08-05 NOTE — CARE PLAN
The patient's goals for the shift include      The clinical goals for the shift include pt will remain without injury    Problem: Pain - Adult  Goal: Verbalizes/displays adequate comfort level or baseline comfort level  Outcome: Progressing     Problem: Safety - Adult  Goal: Free from fall injury  Outcome: Progressing     Problem: Discharge Planning  Goal: Discharge to home or other facility with appropriate resources  Outcome: Progressing     Problem: Chronic Conditions and Co-morbidities  Goal: Patient's chronic conditions and co-morbidity symptoms are monitored and maintained or improved  Outcome: Progressing     Problem: Nutrition  Goal: Nutrient intake appropriate for maintaining nutritional needs  Outcome: Progressing

## 2025-08-06 LAB
ALBUMIN SERPL BCP-MCNC: 3.3 G/DL (ref 3.4–5)
ANION GAP SERPL CALC-SCNC: 14 MMOL/L (ref 10–20)
BACTERIA UR CULT: NORMAL
BUN SERPL-MCNC: 92 MG/DL (ref 6–23)
CALCIUM SERPL-MCNC: 8.8 MG/DL (ref 8.6–10.6)
CHLORIDE SERPL-SCNC: 114 MMOL/L (ref 98–107)
CO2 SERPL-SCNC: 16 MMOL/L (ref 21–32)
CREAT SERPL-MCNC: 4.21 MG/DL (ref 0.5–1.05)
EGFRCR SERPLBLD CKD-EPI 2021: 11 ML/MIN/1.73M*2
GLUCOSE BLD MANUAL STRIP-MCNC: 109 MG/DL (ref 74–99)
GLUCOSE BLD MANUAL STRIP-MCNC: 116 MG/DL (ref 74–99)
GLUCOSE BLD MANUAL STRIP-MCNC: 117 MG/DL (ref 74–99)
GLUCOSE BLD MANUAL STRIP-MCNC: 196 MG/DL (ref 74–99)
GLUCOSE SERPL-MCNC: 100 MG/DL (ref 74–99)
HOLD SPECIMEN: NORMAL
MAGNESIUM SERPL-MCNC: 2.18 MG/DL (ref 1.6–2.4)
PHOSPHATE SERPL-MCNC: 3.3 MG/DL (ref 2.5–4.9)
POTASSIUM SERPL-SCNC: 4.2 MMOL/L (ref 3.5–5.3)
SODIUM SERPL-SCNC: 140 MMOL/L (ref 136–145)

## 2025-08-06 PROCEDURE — 82947 ASSAY GLUCOSE BLOOD QUANT: CPT

## 2025-08-06 PROCEDURE — 2500000001 HC RX 250 WO HCPCS SELF ADMINISTERED DRUGS (ALT 637 FOR MEDICARE OP)

## 2025-08-06 PROCEDURE — 36415 COLL VENOUS BLD VENIPUNCTURE: CPT | Performed by: STUDENT IN AN ORGANIZED HEALTH CARE EDUCATION/TRAINING PROGRAM

## 2025-08-06 PROCEDURE — 83735 ASSAY OF MAGNESIUM: CPT | Performed by: STUDENT IN AN ORGANIZED HEALTH CARE EDUCATION/TRAINING PROGRAM

## 2025-08-06 PROCEDURE — 82947 ASSAY GLUCOSE BLOOD QUANT: CPT | Performed by: STUDENT IN AN ORGANIZED HEALTH CARE EDUCATION/TRAINING PROGRAM

## 2025-08-06 PROCEDURE — 99232 SBSQ HOSP IP/OBS MODERATE 35: CPT

## 2025-08-06 PROCEDURE — 2500000004 HC RX 250 GENERAL PHARMACY W/ HCPCS (ALT 636 FOR OP/ED): Performed by: STUDENT IN AN ORGANIZED HEALTH CARE EDUCATION/TRAINING PROGRAM

## 2025-08-06 PROCEDURE — 1210000001 HC SEMI-PRIVATE ROOM DAILY

## 2025-08-06 PROCEDURE — 2500000004 HC RX 250 GENERAL PHARMACY W/ HCPCS (ALT 636 FOR OP/ED)

## 2025-08-06 PROCEDURE — 84100 ASSAY OF PHOSPHORUS: CPT | Performed by: STUDENT IN AN ORGANIZED HEALTH CARE EDUCATION/TRAINING PROGRAM

## 2025-08-06 PROCEDURE — 2500000001 HC RX 250 WO HCPCS SELF ADMINISTERED DRUGS (ALT 637 FOR MEDICARE OP): Performed by: STUDENT IN AN ORGANIZED HEALTH CARE EDUCATION/TRAINING PROGRAM

## 2025-08-06 PROCEDURE — 2500000002 HC RX 250 W HCPCS SELF ADMINISTERED DRUGS (ALT 637 FOR MEDICARE OP, ALT 636 FOR OP/ED): Performed by: STUDENT IN AN ORGANIZED HEALTH CARE EDUCATION/TRAINING PROGRAM

## 2025-08-06 RX ORDER — CIPROFLOXACIN 500 MG/1
250 TABLET, FILM COATED ORAL DAILY
Status: DISCONTINUED | OUTPATIENT
Start: 2025-08-06 | End: 2025-08-08 | Stop reason: HOSPADM

## 2025-08-06 RX ORDER — AMOXICILLIN 250 MG
1 CAPSULE ORAL 2 TIMES DAILY
Status: DISCONTINUED | OUTPATIENT
Start: 2025-08-06 | End: 2025-08-08 | Stop reason: HOSPADM

## 2025-08-06 RX ORDER — CIPROFLOXACIN 500 MG/1
250 TABLET, FILM COATED ORAL DAILY
Status: DISCONTINUED | OUTPATIENT
Start: 2025-08-06 | End: 2025-08-06

## 2025-08-06 RX ORDER — SODIUM BICARBONATE 650 MG/1
1300 TABLET ORAL 2 TIMES DAILY
Status: DISCONTINUED | OUTPATIENT
Start: 2025-08-06 | End: 2025-08-08 | Stop reason: HOSPADM

## 2025-08-06 RX ADMIN — ISOSORBIDE MONONITRATE 30 MG: 30 TABLET, EXTENDED RELEASE ORAL at 10:47

## 2025-08-06 RX ADMIN — HEPARIN SODIUM 5000 UNITS: 5000 INJECTION INTRAVENOUS; SUBCUTANEOUS at 10:46

## 2025-08-06 RX ADMIN — ASPIRIN 81 MG: 81 TABLET, COATED ORAL at 10:47

## 2025-08-06 RX ADMIN — SODIUM BICARBONATE 1300 MG: 650 TABLET ORAL at 21:29

## 2025-08-06 RX ADMIN — Medication 1 TABLET: at 10:46

## 2025-08-06 RX ADMIN — POLYETHYLENE GLYCOL 3350 17 G: 17 POWDER, FOR SOLUTION ORAL at 10:48

## 2025-08-06 RX ADMIN — HEPARIN SODIUM 5000 UNITS: 5000 INJECTION INTRAVENOUS; SUBCUTANEOUS at 17:09

## 2025-08-06 RX ADMIN — ATORVASTATIN CALCIUM 40 MG: 40 TABLET, FILM COATED ORAL at 10:47

## 2025-08-06 RX ADMIN — TAMSULOSIN HYDROCHLORIDE 0.8 MG: 0.4 CAPSULE ORAL at 10:58

## 2025-08-06 RX ADMIN — HYDRALAZINE HYDROCHLORIDE 50 MG: 50 TABLET ORAL at 10:47

## 2025-08-06 RX ADMIN — INSULIN LISPRO 1 UNITS: 100 INJECTION, SOLUTION INTRAVENOUS; SUBCUTANEOUS at 21:27

## 2025-08-06 RX ADMIN — CARVEDILOL 12.5 MG: 12.5 TABLET, FILM COATED ORAL at 21:29

## 2025-08-06 RX ADMIN — CARVEDILOL 12.5 MG: 12.5 TABLET, FILM COATED ORAL at 10:47

## 2025-08-06 RX ADMIN — TORSEMIDE 40 MG: 20 TABLET ORAL at 10:46

## 2025-08-06 RX ADMIN — HYDRALAZINE HYDROCHLORIDE 50 MG: 50 TABLET ORAL at 21:28

## 2025-08-06 RX ADMIN — CIPROFOLXACIN 250 MG: 500 TABLET ORAL at 17:01

## 2025-08-06 RX ADMIN — SENNOSIDES AND DOCUSATE SODIUM 1 TABLET: 8.6; 5 TABLET ORAL at 21:31

## 2025-08-06 RX ADMIN — FERROUS SULFATE TAB 325 MG (65 MG ELEMENTAL FE) 1 TABLET: 325 (65 FE) TAB at 10:48

## 2025-08-06 RX ADMIN — SENNOSIDES AND DOCUSATE SODIUM 1 TABLET: 8.6; 5 TABLET ORAL at 10:46

## 2025-08-06 ASSESSMENT — ACTIVITIES OF DAILY LIVING (ADL): LACK_OF_TRANSPORTATION: NO

## 2025-08-06 ASSESSMENT — PAIN - FUNCTIONAL ASSESSMENT: PAIN_FUNCTIONAL_ASSESSMENT: 0-10

## 2025-08-06 ASSESSMENT — PAIN SCALES - GENERAL
PAINLEVEL_OUTOF10: 0 - NO PAIN
PAINLEVEL_OUTOF10: 0 - NO PAIN

## 2025-08-06 NOTE — PROGRESS NOTES
08/06/25 1241   Rapid Rounds   Attendance Provider;Care Transitions   Expected Discharge Disposition Home   Today we still await: Clinical stability   Review at Escalation Rounds No escalation needed     Transitional Care Coordinator Progress Note:   Await nephrology recommendations.  Pt's labs are stable and pt reports feeling better.  ADOD is 8/7. PT is recommending low intensity, pt is agreeable. Pt was offered preference for home care agency,  pt was ok with using [facility].  Care coordinator will continue to follow for discharge planning needs.     Nina Warren MSN, RN-BC  Transitional Care Coordinator (TCC)  [phone]

## 2025-08-06 NOTE — PROGRESS NOTES
08/06/25 0644   Discharge Planning   Living Arrangements Spouse/significant other  (Home with boyfriend Brandon.)   Support Systems Spouse/significant other;Children   Assistance Needed None.   Type of Residence Private residence   Do you have animals or pets at home? No   Who is requesting discharge planning? Patient   Home or Post Acute Services In home services   Type of Home Care Services Home PT   Expected Discharge Disposition Home H  ([facility])   Does the patient need discharge transport arranged? No  (Boyfriend Brandon will provide.)   Financial Resource Strain   How hard is it for you to pay for the very basics like food, housing, medical care, and heating? Not hard   Housing Stability   In the last 12 months, was there a time when you were not able to pay the mortgage or rent on time? N   At any time in the past 12 months, were you homeless or living in a shelter (including now)? N   Transportation Needs   In the past 12 months, has lack of transportation kept you from medical appointments or from getting medications? no   In the past 12 months, has lack of transportation kept you from meetings, work, or from getting things needed for daily living? No   Patient Choice   Provider Choice list and CMS website (https://medicare.gov/care-compare#search) for post-acute Quality and Resource Measure Data were provided and reviewed with: Patient   Intensity of Service   Intensity of Service 0-30 min     Assessment Note:  Met with pt and introduced myself as care coordinator and member of the Care Transitions team for discharge planning.   Pt was recently at the [facility] for 2 months.  Pt has HHAs and boyfriend who are able to assist as needed at home.  Pt's boyfriend provides transport to [email or web address] [place].  Pt's address, phone number and contact information was verified.  Pt does not have any questions/concerns at this time.     Previous Home Care: [facility] provides HHAs 5 days/wk for 4 hrs/day.  Pt's  boyfriend provides assistance with wound care (betadine, kerlix and ace wrap).   DME: Shower bench, shower chair, rollator, cane and wheelchair.  Pharmacy: [facility] in [place].  Falls: Denies.  PCP:   Dr Mone Fox (last visit was 3/2025).    Nina Warren MSN, RN-BC  Transitional Care Coordinator (TCC)  [phone]

## 2025-08-06 NOTE — PROGRESS NOTES
Nuris GEORGE Squires   60 y.o.    @@  [facility]/Room: 90302389/342/342-B    Subjective: Patient reports that she is doing well. Her epigastric pain is decreasing but still present.     Objective:     Meds:   aspirin, 81 mg, Daily  atorvastatin, 40 mg, Daily  B complex-vitamin C, 1 tablet, Daily  carvedilol, 12.5 mg, BID  cefepime, 2 g, q24h  ferrous sulfate, 65 mg of elemental iron, Daily with breakfast  [Held by provider] gabapentin, 100 mg, Nightly  heparin (porcine), 5,000 Units, q8h  hydrALAZINE, 50 mg, TID  insulin lispro, 0-5 Units, TID AC  isosorbide mononitrate ER, 30 mg, Daily  pantoprazole, 40 mg, Daily before breakfast  polyethylene glycol, 17 g, Daily  sennosides-docusate sodium, 1 tablet, BID  tamsulosin, 0.8 mg, Daily  torsemide, 40 mg, Daily         acetaminophen, 650 mg, q6h PRN  alum-mag hydroxide-simeth, 10 mL, 4x daily PRN  dextrose, 12.5 g, q15 min PRN  dextrose, 25 g, q15 min PRN  glucagon, 1 mg, q15 min PRN  glucagon, 1 mg, q15 min PRN        Vitals:    08/06/25 0610   BP: 119/58   Pulse: 65   Resp:    Temp: 36.7 °C (98.1 °F)   SpO2: 98%          Intake/Output Summary (Last 24 hours) at 8/6/2025 0956  Last data filed at 8/6/2025 0610  Gross per 24 hour   Intake 50 ml   Output 1250 ml   Net -1200 ml       General appearance: no distress  Eyes: non-icteric  Skin: no apparent rash  Heart: regular rate and rhythm   Lungs: CTA bilat, No wheezing/crackles  Abdomen: soft, nt/nd  Extremities: Mild lower extremity edema bilat  Louis: none  Neuro: No FND,asterixis  Access: none    Blood Labs:  Results for orders placed or performed during the hospital encounter of 08/04/25 (from the past 24 hours)   POCT GLUCOSE   Result Value Ref Range    POCT Glucose 108 (H) 74 - 99 mg/dL   Urinalysis with Reflex Culture and Microscopic   Result Value Ref Range    Color, Urine Colorless (N) Light-Yellow, Yellow, Dark-Yellow    Appearance, Urine Ex.Turbid (N) Clear    Specific Gravity, Urine 1.011 1.005 - 1.035    pH, Urine 6.0  5.0, 5.5, 6.0, 6.5, 7.0, 7.5, 8.0    Protein, Urine 70 (1+) (A) NEGATIVE, 10 (TRACE), 20 (TRACE) mg/dL    Glucose, Urine Normal Normal mg/dL    Blood, Urine 0.03 (TRACE) (A) NEGATIVE mg/dL    Ketones, Urine NEGATIVE NEGATIVE mg/dL    Bilirubin, Urine NEGATIVE NEGATIVE mg/dL    Urobilinogen, Urine Normal Normal mg/dL    Nitrite, Urine NEGATIVE NEGATIVE    Leukocyte Esterase, Urine 500 Rashi/uL (A) NEGATIVE   Extra Urine Gray Tube   Result Value Ref Range    Extra Tube     Microscopic Only, Urine   Result Value Ref Range    WBC, Urine >50 (A) 1-5, NONE /HPF    WBC Clumps, Urine MANY Reference range not established. /HPF    RBC, Urine 3-5 NONE, 1-2, 3-5 /HPF    Squamous Epithelial Cells, Urine 1-9 (SPARSE) Reference range not established. /HPF    Bacteria, Urine 1+ (A) NONE SEEN /HPF   Urine Gray Tube   Result Value Ref Range    Extra Tube     POCT GLUCOSE   Result Value Ref Range    POCT Glucose 126 (H) 74 - 99 mg/dL   POCT GLUCOSE   Result Value Ref Range    POCT Glucose 187 (H) 74 - 99 mg/dL   Magnesium   Result Value Ref Range    Magnesium 2.18 1.60 - 2.40 mg/dL   Renal function panel   Result Value Ref Range    Glucose 100 (H) 74 - 99 mg/dL    Sodium 140 136 - 145 mmol/L    Potassium 4.2 3.5 - 5.3 mmol/L    Chloride 114 (H) 98 - 107 mmol/L    Bicarbonate 16 (L) 21 - 32 mmol/L    Anion Gap 14 10 - 20 mmol/L    Urea Nitrogen 92 (HH) 6 - 23 mg/dL    Creatinine 4.21 (H) 0.50 - 1.05 mg/dL    eGFR 11 (L) >60 mL/min/1.73m*2    Calcium 8.8 8.6 - 10.6 mg/dL    Phosphorus 3.3 2.5 - 4.9 mg/dL    Albumin 3.3 (L) 3.4 - 5.0 g/dL   POCT GLUCOSE   Result Value Ref Range    POCT Glucose 116 (H) 74 - 99 mg/dL     *Note: Due to a large number of results and/or encounters for the requested time period, some results have not been displayed. A complete set of results can be found in Results Review.      ASSESSMENT:  Nuris Squires is a  60 y.o.  Year old , with PMHx of CKD stage 4, DM, HTN, HFpEF, HLD, PAD, MO, multiple foot infections,  and ADHF who was admitted for worsening kidney function. Patients lab values have been improving while inpatient with supportive care and diuresis. Kidney function stable today with some worsening acidosis. Nephrology will continue to monitor.      # VENKAT on CKD, nonoliguric  - Baseline creatinine: 2.4   - Etiology: Diabetes   - UA showed:  1+ protein, trace blood, negative ketones, negative nitrites, + leukocyte esterases   - Clinical volume status: hypervolemic  - Electrolytes (Na, K, Ca, Phos) stable  - Acid base status: Bicarb 16 from 18 yesterday.     RECOMMENDATIONS:  - Can add 1300mg sodium bicarb tablets BID for acidosis.   - Will continue to monitor       Kobe Hermosillo MD  Daytime / Weekend Renal Pager [phone]  After 7 pm Emergencies [phone] Pager [phone]

## 2025-08-06 NOTE — CARE PLAN
The patient's goals for the shift include      The clinical goals for the shift include pt will remain safe during shift      Problem: Pain - Adult  Goal: Verbalizes/displays adequate comfort level or baseline comfort level  Outcome: Progressing     Problem: Safety - Adult  Goal: Free from fall injury  Outcome: Progressing     Problem: Discharge Planning  Goal: Discharge to home or other facility with appropriate resources  Outcome: Progressing     Problem: Chronic Conditions and Co-morbidities  Goal: Patient's chronic conditions and co-morbidity symptoms are monitored and maintained or improved  Outcome: Progressing     Problem: Nutrition  Goal: Nutrient intake appropriate for maintaining nutritional needs  Outcome: Progressing

## 2025-08-06 NOTE — PROGRESS NOTES
60 y.o. female admitted for UTI (urinary tract infection), uncomplicated [N39.0]  VENKAT (acute kidney injury) [N17.9]. Today is Hospital Day 2.    Subjective   No acute overnight events reported. Patient seen resting comfortably in bed this morning. Patient states her abdominal pain has improved but not resolved. She did not have a BM yesterday. Patient is hopeful she will not need dialysis.        Objective     Scheduled Medications:   Scheduled Medications[1]     Continuous Medications:   Continuous Medications[2]     PRN Medications:   PRN Medications[3]    Dietary Orders (From admission, onward)       Start     Ordered    08/05/25 0157  May Participate in Room Service  ( ROOM SERVICE MAY PARTICIPATE)  Once        Question:  .  Answer:  Yes    08/05/25 0156 08/05/25 0008  Adult diet Renal; Potassium Restricted 2 gm (50mEq); 2 - 3 grams Sodium  Diet effective now        Question Answer Comment   Diet type Renal    Potassium restriction: Potassium Restricted 2 gm (50mEq)    Sodium restriction: 2 - 3 grams Sodium        08/05/25 0009                    Vitals:  Most Recent:  Vitals:    08/06/25 0610   BP: 119/58   Pulse: 65   Resp:    Temp: 36.7 °C (98.1 °F)   SpO2: 98%       24hr Min/Max:  Temp  Min: 36.5 °C (97.7 °F)  Max: 36.7 °C (98.1 °F)  Pulse  Min: 65  Max: 72  BP  Min: 119/58  Max: 140/50  SpO2  Min: 98 %  Max: 98 %    Intake/Output x24h:    Intake/Output Summary (Last 24 hours) at 8/6/2025 0923  Last data filed at 8/6/2025 0610  Gross per 24 hour   Intake 50 ml   Output 1250 ml   Net -1200 ml        Physical Exam:  Physical Exam  Constitutional:       General: She is not in acute distress.     Appearance: Normal appearance. She is not ill-appearing.     Eyes:      Extraocular Movements: Extraocular movements intact.       Cardiovascular:      Rate and Rhythm: Normal rate and regular rhythm.   Pulmonary:      Effort: Pulmonary effort is normal.      Breath sounds: No wheezing or rhonchi.   Abdominal:       General: There is no distension.      Tenderness: There is abdominal tenderness (epigastric pain on deep palpation). There is no guarding.     Neurological:      Mental Status: She is alert and oriented to person, place, and time. Mental status is at baseline.     Psychiatric:         Mood and Affect: Mood normal.         Behavior: Behavior normal.         Relevant Results  Results for orders placed or performed during the hospital encounter of 08/04/25 (from the past 24 hours)   POCT GLUCOSE   Result Value Ref Range    POCT Glucose 108 (H) 74 - 99 mg/dL   Urinalysis with Reflex Culture and Microscopic   Result Value Ref Range    Color, Urine Colorless (N) Light-Yellow, Yellow, Dark-Yellow    Appearance, Urine Ex.Turbid (N) Clear    Specific Gravity, Urine 1.011 1.005 - 1.035    pH, Urine 6.0 5.0, 5.5, 6.0, 6.5, 7.0, 7.5, 8.0    Protein, Urine 70 (1+) (A) NEGATIVE, 10 (TRACE), 20 (TRACE) mg/dL    Glucose, Urine Normal Normal mg/dL    Blood, Urine 0.03 (TRACE) (A) NEGATIVE mg/dL    Ketones, Urine NEGATIVE NEGATIVE mg/dL    Bilirubin, Urine NEGATIVE NEGATIVE mg/dL    Urobilinogen, Urine Normal Normal mg/dL    Nitrite, Urine NEGATIVE NEGATIVE    Leukocyte Esterase, Urine 500 Rashi/uL (A) NEGATIVE   Extra Urine Gray Tube   Result Value Ref Range    Extra Tube     Microscopic Only, Urine   Result Value Ref Range    WBC, Urine >50 (A) 1-5, NONE /HPF    WBC Clumps, Urine MANY Reference range not established. /HPF    RBC, Urine 3-5 NONE, 1-2, 3-5 /HPF    Squamous Epithelial Cells, Urine 1-9 (SPARSE) Reference range not established. /HPF    Bacteria, Urine 1+ (A) NONE SEEN /HPF   Urine Gray Tube   Result Value Ref Range    Extra Tube     POCT GLUCOSE   Result Value Ref Range    POCT Glucose 126 (H) 74 - 99 mg/dL   POCT GLUCOSE   Result Value Ref Range    POCT Glucose 187 (H) 74 - 99 mg/dL   Magnesium   Result Value Ref Range    Magnesium 2.18 1.60 - 2.40 mg/dL   Renal function panel   Result Value Ref Range    Glucose 100 (H)  74 - 99 mg/dL    Sodium 140 136 - 145 mmol/L    Potassium 4.2 3.5 - 5.3 mmol/L    Chloride 114 (H) 98 - 107 mmol/L    Bicarbonate 16 (L) 21 - 32 mmol/L    Anion Gap 14 10 - 20 mmol/L    Urea Nitrogen 92 (HH) 6 - 23 mg/dL    Creatinine 4.21 (H) 0.50 - 1.05 mg/dL    eGFR 11 (L) >60 mL/min/1.73m*2    Calcium 8.8 8.6 - 10.6 mg/dL    Phosphorus 3.3 2.5 - 4.9 mg/dL    Albumin 3.3 (L) 3.4 - 5.0 g/dL   POCT GLUCOSE   Result Value Ref Range    POCT Glucose 116 (H) 74 - 99 mg/dL     *Note: Due to a large number of results and/or encounters for the requested time period, some results have not been displayed. A complete set of results can be found in Results Review.      Imaging  XR chest 1 view  Result Date: 8/5/2025  1.  Mild perihilar interstitial prominence with left midlung atelectasis. 2. If there is concern for developing infiltrate recommend follow-up with PA and lateral x-ray.     Signed by: Luis Taveras 8/5/2025 12:05 PM Dictation workstation:   [id number]    XR abdomen 1 view  Result Date: 8/5/2025  1.  Air-filled loops of small and large bowel with distal rectal gas. Correlate with a component of ileus.   Signed by: Luis Taveras 8/5/2025 12:04 PM Dictation workstation:   [id number]    XR knee left 1-2 views  Result Date: 7/31/2025  Uncomplicated appearance of distal femoral fracture fixation.   MACRO: None   Signed by: Randal Garcia 7/31/2025 1:35 PM Dictation workstation:   [id number]    XR femur left 2+ views  Result Date: 7/31/2025  Uncomplicated appearance of distal femoral fracture fixation.   MACRO: None   Signed by: Randal Garcia 7/31/2025 1:35 PM Dictation workstation:   [id number]      Cardiology, Vascular, and Other Imaging  ECG 12 lead  Result Date: 8/4/2025  Normal sinus rhythm Right axis deviation Low voltage QRS Cannot rule out Anterior infarct (cited on or before 15-ISAK-2024) Abnormal ECG When compared with ECG of 24-MAY-2025 18:29, No significant change was found See ED provider note for  full interpretation and clinical correlation Confirmed by Kaye Jay ([id number]) on 8/4/2025 7:59:03 PM             Assessment/Plan   Assessment & Plan  VENKAT (acute kidney injury)    Nuris Squires is a 59 yo Female with a PMHx of CKD stage 4, DM, HTN, HFpEF, HLD, PAD, MO, multiple foot infections, multiple hospital admissions over past 18mths for infections and ADHF frequently with associated VENKAT on CKD who presents to [facility] ED at the request due to worsening labs indicated renal dysfunction. Review of her history demonstrates a history of progressively worsening CKD with a recent worsening in labs, with BUN now hovering around ~100. The patient's primary symptomatic concern at this time is her abdominal pain. No noted hematochezia, hemoptysis, or hematemesis at this time. Nephro consulted, rec no urgent dialysis, and continued monitoring. Abdominal pain improving, tolerating PO intake, however will increase bowel regimen as patient has not had BM in 3 days.     Update 8/6  - Ordered Doris-colace BID  - KUB 8/5 showed air-filled loops of small and large bowel with distal rectal gas. Patient w/o nausea, vomiting and now with symptomatic improvement. Will continue to monitor  - BUN 96>92 and Cr 4.19>4.21, pending updated nephro recs     #VENKAT on CKD vs progression of CKD  #NAGMA with respiratory compensation  :: BUN ~50s earlier this year ->~100 in 7/2025  :: Bicarbonate 15, previously ~20s  - Admission Cr 4.18, initial concerning value 5.17 on 8/1  - Admission BUN 99, 8/1 value 119  - Nephro consulted, rec no urgent dialysis, and continued monitoring.   - Daily RFP  - avoid nephrotoxic agents  - pending updated nephro recs     #Epigastric Abdominal Pain  #c/f ileus  ::~3 week duration, worsens on palpation/eating  - Last BM 3-4 days ago  - KUB pending howed air-filled loops of small and large bowel with distal rectal gas.   - LFTs WNL  - Lipase wnl  - Pantoprazole 40 mg every day  - Patient w/o nausea, vomiting and now  with symptomatic improvement.   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   - PT/OT, mobilize pt as tolerated  - Reports pain is controlled  - Wound care recs      #DMII  - glucose controlled today, she says ~100 at mealtime  - on lispro SSI, continue mild SSI  - Hold basal on day 1 of admission given good glucose control, hospitalization and reduced PO intake     #HFpEF  #HTN  - euvolemic exam  - 1/2025 TTE showed EF 69%, abnormal filling pattern, moderate MR, LA dilation  - Continue home Carvedilol, Imdur, Torsemide,      F: PRN  E: PRN  N: Renal Diet  GI: PPI  DVT: Heparin  Code: Full Code       Code Status: Full Code   Emergency Contact: Extended Emergency Contact Information  Primary Emergency Contact: Brandon Rousseau  Address: [address] [place] [address] [facility]  Home Phone: [phone]  Mobile Phone: [phone]  Relation: Significant Other  Secondary Emergency Contact: [facility]  Mobile Phone: [phone]  Relation: Daughter   needed? No  PCP: Mone Aquino MD      Patient discussed with attending physician Dr. Bailon  Plan preliminary until cosigned by attending physician.    Reinaldo Burden MD  Family Medicine  PGY-3          [1] aspirin, 81 mg, oral, Daily  atorvastatin, 40 mg, oral, Daily  B complex-vitamin C, 1 tablet, oral, Daily  carvedilol, 12.5 mg, oral, BID  cefepime, 2 g, intravenous, q24h  ferrous sulfate, 65 mg of elemental iron, oral, Daily with  breakfast  [Held by provider] gabapentin, 100 mg, oral, Nightly  heparin (porcine), 5,000 Units, subcutaneous, q8h  hydrALAZINE, 50 mg, oral, TID  insulin lispro, 0-5 Units, subcutaneous, TID AC  isosorbide mononitrate ER, 30 mg, oral, Daily  pantoprazole, 40 mg, oral, Daily before breakfast  polyethylene glycol, 17 g, oral, Daily  sennosides-docusate sodium, 1 tablet, oral, BID  tamsulosin, 0.8 mg, oral, Daily  torsemide, 40 mg, oral, Daily  [2]    [3] PRN medications: acetaminophen, alum-mag hydroxide-simeth, dextrose, dextrose, glucagon, glucagon

## 2025-08-07 ENCOUNTER — APPOINTMENT (OUTPATIENT)
Dept: RADIOLOGY | Facility: HOSPITAL | Age: 60
End: 2025-08-07
Payer: COMMERCIAL

## 2025-08-07 LAB
ALBUMIN SERPL BCP-MCNC: 3.2 G/DL (ref 3.4–5)
ANION GAP SERPL CALC-SCNC: 14 MMOL/L (ref 10–20)
BASOPHILS # BLD AUTO: 0.02 X10*3/UL (ref 0–0.1)
BASOPHILS NFR BLD AUTO: 0.3 %
BUN SERPL-MCNC: 90 MG/DL (ref 6–23)
CALCIUM SERPL-MCNC: 8.8 MG/DL (ref 8.6–10.6)
CHLORIDE SERPL-SCNC: 112 MMOL/L (ref 98–107)
CO2 SERPL-SCNC: 18 MMOL/L (ref 21–32)
CREAT SERPL-MCNC: 3.95 MG/DL (ref 0.5–1.05)
EGFRCR SERPLBLD CKD-EPI 2021: 12 ML/MIN/1.73M*2
EOSINOPHIL # BLD AUTO: 0.14 X10*3/UL (ref 0–0.7)
EOSINOPHIL NFR BLD AUTO: 2.4 %
ERYTHROCYTE [DISTWIDTH] IN BLOOD BY AUTOMATED COUNT: 14.6 % (ref 11.5–14.5)
GLUCOSE BLD MANUAL STRIP-MCNC: 122 MG/DL (ref 74–99)
GLUCOSE BLD MANUAL STRIP-MCNC: 129 MG/DL (ref 74–99)
GLUCOSE BLD MANUAL STRIP-MCNC: 139 MG/DL (ref 74–99)
GLUCOSE BLD MANUAL STRIP-MCNC: 172 MG/DL (ref 74–99)
GLUCOSE SERPL-MCNC: 114 MG/DL (ref 74–99)
HCT VFR BLD AUTO: 29.9 % (ref 36–46)
HGB BLD-MCNC: 9.5 G/DL (ref 12–16)
IMM GRANULOCYTES # BLD AUTO: 0.01 X10*3/UL (ref 0–0.7)
IMM GRANULOCYTES NFR BLD AUTO: 0.2 % (ref 0–0.9)
LYMPHOCYTES # BLD AUTO: 2.35 X10*3/UL (ref 1.2–4.8)
LYMPHOCYTES NFR BLD AUTO: 40.4 %
MAGNESIUM SERPL-MCNC: 2.04 MG/DL (ref 1.6–2.4)
MCH RBC QN AUTO: 30 PG (ref 26–34)
MCHC RBC AUTO-ENTMCNC: 31.8 G/DL (ref 32–36)
MCV RBC AUTO: 94 FL (ref 80–100)
MONOCYTES # BLD AUTO: 0.41 X10*3/UL (ref 0.1–1)
MONOCYTES NFR BLD AUTO: 7 %
NEUTROPHILS # BLD AUTO: 2.89 X10*3/UL (ref 1.2–7.7)
NEUTROPHILS NFR BLD AUTO: 49.7 %
NRBC BLD-RTO: 0 /100 WBCS (ref 0–0)
PHOSPHATE SERPL-MCNC: 3.4 MG/DL (ref 2.5–4.9)
PLATELET # BLD AUTO: 201 X10*3/UL (ref 150–450)
POTASSIUM SERPL-SCNC: 3.9 MMOL/L (ref 3.5–5.3)
RBC # BLD AUTO: 3.17 X10*6/UL (ref 4–5.2)
SODIUM SERPL-SCNC: 140 MMOL/L (ref 136–145)
WBC # BLD AUTO: 5.8 X10*3/UL (ref 4.4–11.3)

## 2025-08-07 PROCEDURE — 36415 COLL VENOUS BLD VENIPUNCTURE: CPT

## 2025-08-07 PROCEDURE — 1210000001 HC SEMI-PRIVATE ROOM DAILY

## 2025-08-07 PROCEDURE — 74176 CT ABD & PELVIS W/O CONTRAST: CPT

## 2025-08-07 PROCEDURE — 85025 COMPLETE CBC W/AUTO DIFF WBC: CPT

## 2025-08-07 PROCEDURE — 83735 ASSAY OF MAGNESIUM: CPT

## 2025-08-07 PROCEDURE — 2500000004 HC RX 250 GENERAL PHARMACY W/ HCPCS (ALT 636 FOR OP/ED): Performed by: STUDENT IN AN ORGANIZED HEALTH CARE EDUCATION/TRAINING PROGRAM

## 2025-08-07 PROCEDURE — 74176 CT ABD & PELVIS W/O CONTRAST: CPT | Performed by: RADIOLOGY

## 2025-08-07 PROCEDURE — 2500000004 HC RX 250 GENERAL PHARMACY W/ HCPCS (ALT 636 FOR OP/ED)

## 2025-08-07 PROCEDURE — 2500000001 HC RX 250 WO HCPCS SELF ADMINISTERED DRUGS (ALT 637 FOR MEDICARE OP): Performed by: STUDENT IN AN ORGANIZED HEALTH CARE EDUCATION/TRAINING PROGRAM

## 2025-08-07 PROCEDURE — 82947 ASSAY GLUCOSE BLOOD QUANT: CPT

## 2025-08-07 PROCEDURE — 2500000001 HC RX 250 WO HCPCS SELF ADMINISTERED DRUGS (ALT 637 FOR MEDICARE OP)

## 2025-08-07 PROCEDURE — 99232 SBSQ HOSP IP/OBS MODERATE 35: CPT

## 2025-08-07 PROCEDURE — 82374 ASSAY BLOOD CARBON DIOXIDE: CPT

## 2025-08-07 PROCEDURE — 2500000002 HC RX 250 W HCPCS SELF ADMINISTERED DRUGS (ALT 637 FOR MEDICARE OP, ALT 636 FOR OP/ED): Performed by: STUDENT IN AN ORGANIZED HEALTH CARE EDUCATION/TRAINING PROGRAM

## 2025-08-07 RX ORDER — ALUMINUM HYDROXIDE, MAGNESIUM HYDROXIDE, AND SIMETHICONE 1200; 120; 1200 MG/30ML; MG/30ML; MG/30ML
10 SUSPENSION ORAL ONCE
Status: COMPLETED | OUTPATIENT
Start: 2025-08-07 | End: 2025-08-07

## 2025-08-07 RX ADMIN — CARVEDILOL 12.5 MG: 12.5 TABLET, FILM COATED ORAL at 08:20

## 2025-08-07 RX ADMIN — PANTOPRAZOLE SODIUM 40 MG: 40 TABLET, DELAYED RELEASE ORAL at 06:23

## 2025-08-07 RX ADMIN — HEPARIN SODIUM 5000 UNITS: 5000 INJECTION INTRAVENOUS; SUBCUTANEOUS at 17:08

## 2025-08-07 RX ADMIN — TAMSULOSIN HYDROCHLORIDE 0.8 MG: 0.4 CAPSULE ORAL at 08:19

## 2025-08-07 RX ADMIN — ALUMINUM HYDROXIDE, MAGNESIUM HYDROXIDE, AND SIMETHICONE 10 ML: 200; 200; 20 SUSPENSION ORAL at 08:30

## 2025-08-07 RX ADMIN — ISOSORBIDE MONONITRATE 30 MG: 30 TABLET, EXTENDED RELEASE ORAL at 08:19

## 2025-08-07 RX ADMIN — HYDRALAZINE HYDROCHLORIDE 50 MG: 50 TABLET ORAL at 08:20

## 2025-08-07 RX ADMIN — FERROUS SULFATE TAB 325 MG (65 MG ELEMENTAL FE) 1 TABLET: 325 (65 FE) TAB at 08:19

## 2025-08-07 RX ADMIN — Medication 1 TABLET: at 08:21

## 2025-08-07 RX ADMIN — SODIUM BICARBONATE 1300 MG: 650 TABLET ORAL at 21:50

## 2025-08-07 RX ADMIN — HYDRALAZINE HYDROCHLORIDE 50 MG: 50 TABLET ORAL at 21:50

## 2025-08-07 RX ADMIN — CARVEDILOL 12.5 MG: 12.5 TABLET, FILM COATED ORAL at 21:50

## 2025-08-07 RX ADMIN — ATORVASTATIN CALCIUM 40 MG: 40 TABLET, FILM COATED ORAL at 08:23

## 2025-08-07 RX ADMIN — INSULIN LISPRO 1 UNITS: 100 INJECTION, SOLUTION INTRAVENOUS; SUBCUTANEOUS at 11:59

## 2025-08-07 RX ADMIN — HEPARIN SODIUM 5000 UNITS: 5000 INJECTION INTRAVENOUS; SUBCUTANEOUS at 00:22

## 2025-08-07 RX ADMIN — HYDRALAZINE HYDROCHLORIDE 50 MG: 50 TABLET ORAL at 14:57

## 2025-08-07 RX ADMIN — SODIUM BICARBONATE 1300 MG: 650 TABLET ORAL at 08:20

## 2025-08-07 RX ADMIN — HEPARIN SODIUM 5000 UNITS: 5000 INJECTION INTRAVENOUS; SUBCUTANEOUS at 08:22

## 2025-08-07 RX ADMIN — CIPROFOLXACIN 250 MG: 500 TABLET ORAL at 14:56

## 2025-08-07 RX ADMIN — TORSEMIDE 40 MG: 20 TABLET ORAL at 08:19

## 2025-08-07 RX ADMIN — ASPIRIN 81 MG: 81 TABLET, COATED ORAL at 08:19

## 2025-08-07 ASSESSMENT — COGNITIVE AND FUNCTIONAL STATUS - GENERAL
STANDING UP FROM CHAIR USING ARMS: A LITTLE
MOVING TO AND FROM BED TO CHAIR: A LITTLE
DAILY ACTIVITIY SCORE: 20
MOVING TO AND FROM BED TO CHAIR: A LITTLE
MOBILITY SCORE: 19
HELP NEEDED FOR BATHING: A LITTLE
HELP NEEDED FOR BATHING: A LITTLE
TOILETING: A LITTLE
WALKING IN HOSPITAL ROOM: A LITTLE
DRESSING REGULAR LOWER BODY CLOTHING: A LITTLE
DRESSING REGULAR UPPER BODY CLOTHING: A LITTLE
DAILY ACTIVITIY SCORE: 20
CLIMB 3 TO 5 STEPS WITH RAILING: A LOT
TOILETING: A LITTLE
DRESSING REGULAR LOWER BODY CLOTHING: A LITTLE
DRESSING REGULAR UPPER BODY CLOTHING: A LITTLE
MOBILITY SCORE: 19
STANDING UP FROM CHAIR USING ARMS: A LITTLE
WALKING IN HOSPITAL ROOM: A LITTLE
CLIMB 3 TO 5 STEPS WITH RAILING: A LOT

## 2025-08-07 ASSESSMENT — PAIN SCALES - GENERAL: PAINLEVEL_OUTOF10: 3

## 2025-08-07 ASSESSMENT — PAIN DESCRIPTION - DESCRIPTORS: DESCRIPTORS: ACHING

## 2025-08-07 NOTE — CARE PLAN
The patient's goals for the shift include      The clinical goals for the shift include Patient will remain HDS during shift      Problem: Pain - Adult  Goal: Verbalizes/displays adequate comfort level or baseline comfort level  Outcome: Progressing     Problem: Safety - Adult  Goal: Free from fall injury  Outcome: Progressing     Problem: Discharge Planning  Goal: Discharge to home or other facility with appropriate resources  Outcome: Progressing     Problem: Chronic Conditions and Co-morbidities  Goal: Patient's chronic conditions and co-morbidity symptoms are monitored and maintained or improved  Outcome: Progressing     Problem: Nutrition  Goal: Nutrient intake appropriate for maintaining nutritional needs  Outcome: Progressing

## 2025-08-07 NOTE — PROGRESS NOTES
60 y.o. female admitted for UTI (urinary tract infection), uncomplicated [N39.0]  VENKAT (acute kidney injury) [N17.9]. Today is Hospital Day 3.    Subjective   No acute overnight events reported. Patient seen sleeping in bed this morning, easily awoken. Patient states her abdominal pain gotten slightly worse, compared to yesterday. She describes it as cramp/ache, denies nausea or vomiting. She had one BM yesterday, normal caliber.        Objective     Scheduled Medications:   Scheduled Medications[1]     Continuous Medications:   Continuous Medications[2]     PRN Medications:   PRN Medications[3]    Dietary Orders (From admission, onward)       Start     Ordered    08/05/25 0157  May Participate in Room Service  ( ROOM SERVICE MAY PARTICIPATE)  Once        Question:  .  Answer:  Yes    08/05/25 0156 08/05/25 0008  Adult diet Renal; Potassium Restricted 2 gm (50mEq); 2 - 3 grams Sodium  Diet effective now        Question Answer Comment   Diet type Renal    Potassium restriction: Potassium Restricted 2 gm (50mEq)    Sodium restriction: 2 - 3 grams Sodium        08/05/25 0009                    Vitals:  Most Recent:  Vitals:    08/07/25 0526   BP: (!) 115/40   Pulse: 68   Resp:    Temp: 36.3 °C (97.3 °F)   SpO2: 96%       24hr Min/Max:  Temp  Min: 36.3 °C (97.3 °F)  Max: 36.6 °C (97.9 °F)  Pulse  Min: 64  Max: 68  BP  Min: 110/51  Max: 153/60  Resp  Min: 18  Max: 19  SpO2  Min: 96 %  Max: 100 %    Intake/Output x24h:    Intake/Output Summary (Last 24 hours) at 8/7/2025 1333  Last data filed at 8/7/2025 0407  Gross per 24 hour   Intake --   Output 1600 ml   Net -1600 ml        Physical Exam:  Physical Exam  Constitutional:       General: She is not in acute distress.     Appearance: Normal appearance. She is not ill-appearing.     Eyes:      Extraocular Movements: Extraocular movements intact.       Cardiovascular:      Rate and Rhythm: Normal rate and regular rhythm.   Pulmonary:      Effort: Pulmonary effort is  normal.      Breath sounds: No wheezing or rhonchi.   Abdominal:      General: There is no distension.      Tenderness: There is abdominal tenderness (epigastric pain on deep palpation). There is no guarding.     Neurological:      Mental Status: She is alert and oriented to person, place, and time. Mental status is at baseline.     Psychiatric:         Mood and Affect: Mood normal.         Behavior: Behavior normal.         Relevant Results  Results for orders placed or performed during the hospital encounter of 08/04/25 (from the past 24 hours)   POCT GLUCOSE   Result Value Ref Range    POCT Glucose 109 (H) 74 - 99 mg/dL   POCT GLUCOSE   Result Value Ref Range    POCT Glucose 196 (H) 74 - 99 mg/dL   Magnesium   Result Value Ref Range    Magnesium 2.04 1.60 - 2.40 mg/dL   Renal Function Panel   Result Value Ref Range    Glucose 114 (H) 74 - 99 mg/dL    Sodium 140 136 - 145 mmol/L    Potassium 3.9 3.5 - 5.3 mmol/L    Chloride 112 (H) 98 - 107 mmol/L    Bicarbonate 18 (L) 21 - 32 mmol/L    Anion Gap 14 10 - 20 mmol/L    Urea Nitrogen 90 (H) 6 - 23 mg/dL    Creatinine 3.95 (H) 0.50 - 1.05 mg/dL    eGFR 12 (L) >60 mL/min/1.73m*2    Calcium 8.8 8.6 - 10.6 mg/dL    Phosphorus 3.4 2.5 - 4.9 mg/dL    Albumin 3.2 (L) 3.4 - 5.0 g/dL   CBC and Auto Differential   Result Value Ref Range    WBC 5.8 4.4 - 11.3 x10*3/uL    nRBC 0.0 0.0 - 0.0 /100 WBCs    RBC 3.17 (L) 4.00 - 5.20 x10*6/uL    Hemoglobin 9.5 (L) 12.0 - 16.0 g/dL    Hematocrit 29.9 (L) 36.0 - 46.0 %    MCV 94 80 - 100 fL    MCH 30.0 26.0 - 34.0 pg    MCHC 31.8 (L) 32.0 - 36.0 g/dL    RDW 14.6 (H) 11.5 - 14.5 %    Platelets 201 150 - 450 x10*3/uL    Neutrophils % 49.7 40.0 - 80.0 %    Immature Granulocytes %, Automated 0.2 0.0 - 0.9 %    Lymphocytes % 40.4 13.0 - 44.0 %    Monocytes % 7.0 2.0 - 10.0 %    Eosinophils % 2.4 0.0 - 6.0 %    Basophils % 0.3 0.0 - 2.0 %    Neutrophils Absolute 2.89 1.20 - 7.70 x10*3/uL    Immature Granulocytes Absolute, Automated 0.01 0.00  - 0.70 x10*3/uL    Lymphocytes Absolute 2.35 1.20 - 4.80 x10*3/uL    Monocytes Absolute 0.41 0.10 - 1.00 x10*3/uL    Eosinophils Absolute 0.14 0.00 - 0.70 x10*3/uL    Basophils Absolute 0.02 0.00 - 0.10 x10*3/uL   POCT GLUCOSE   Result Value Ref Range    POCT Glucose 122 (H) 74 - 99 mg/dL   POCT GLUCOSE   Result Value Ref Range    POCT Glucose 172 (H) 74 - 99 mg/dL     *Note: Due to a large number of results and/or encounters for the requested time period, some results have not been displayed. A complete set of results can be found in Results Review.      Imaging  XR chest 1 view  Result Date: 8/5/2025  1.  Mild perihilar interstitial prominence with left midlung atelectasis. 2. If there is concern for developing infiltrate recommend follow-up with PA and lateral x-ray.     Signed by: Luis Taveras 8/5/2025 12:05 PM Dictation workstation:   [id number]    XR abdomen 1 view  Result Date: 8/5/2025  1.  Air-filled loops of small and large bowel with distal rectal gas. Correlate with a component of ileus.   Signed by: Luis Taveras 8/5/2025 12:04 PM Dictation workstation:   [id number]      Cardiology, Vascular, and Other Imaging  ECG 12 lead  Result Date: 8/4/2025  Normal sinus rhythm Right axis deviation Low voltage QRS Cannot rule out Anterior infarct (cited on or before 15-ISAK-2024) Abnormal ECG When compared with ECG of 24-MAY-2025 18:29, No significant change was found See ED provider note for full interpretation and clinical correlation Confirmed by Kaye Jay ([id number]) on 8/4/2025 7:59:03 PM             Assessment/Plan   Assessment & Plan  VENKAT (acute kidney injury)    Nuris Squires is a 61 yo Female with a PMHx of CKD stage 4, DM, HTN, HFpEF, HLD, PAD, MO, multiple foot infections, multiple hospital admissions over past 18mths for infections and ADHF frequently with associated VENKAT on CKD who presents to [facility] ED at the request due to worsening labs indicated renal dysfunction. Review of her history  demonstrates a history of progressively worsening CKD with a recent worsening in labs, with BUN now hovering around ~100. The patient's primary symptomatic concern at this time is her abdominal pain. No noted hematochezia, hemoptysis, or hematemesis at this time. Nephro consulted, rec no urgent dialysis, and continued monitoring. Abdominal pain initially improving, however, slightly worsened today. Patient continues to tolerate PO intake and has had no episodes of nausea or vomiting. Will give Mylanta and order further imaging.      Update 8/7  - Abdominal pain worsened this AM, no nausea or vomiting. Ordered Mylanta and abdominal imaging  - Had 1x BM, normal stool caliber  - Ordered Sodium Bicarb 1300 mg BID per Nephro recs  - Urine Culture contaminated, transitioned to PO Cipro     #VENKAT on CKD vs progression of CKD  #NAGMA with respiratory compensation  :: BUN ~50s earlier this year ->~100 in 7/2025  :: Bicarbonate 15, previously ~20s  - Admission Cr 4.18, initial concerning value 5.17 on 8/1  - Admission BUN 99, 8/1 value 119  - Nephro consulted, rec no urgent dialysis, and continued monitoring.   - Daily RFP  - avoid nephrotoxic agents  - Ordered Sodium Bicarb 1300 mg BID per nephro recs     #Epigastric Abdominal Pain  #c/f ileus  ::~3 week duration, worsens on palpation/eating  - Last BM 3-4 days ago  - KUB pending howed air-filled loops of small and large bowel with distal rectal gas.   - LFTs WNL  - Lipase wnl  - Pantoprazole 40 mg every day  - Miralax and Doris-Colace ordered  - Had 1x BM, normal stool caliber  - Abdominal pain worsened this AM, no nausea or vomiting.   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   - PT/OT, mobilize pt as tolerated  - Reports pain is controlled  - Wound care recs      #DMII  - glucose controlled today, she says ~100 at mealtime  - on lispro SSI, continue mild SSI  - Hold basal on day 1 of admission given good glucose control, hospitalization and reduced PO intake     #HFpEF  #HTN  - euvolemic exam  - 1/2025 TTE showed EF 69%, abnormal filling pattern, moderate MR, LA dilation  - Continue home Carvedilol, Imdur, Torsemide,      F: PRN  E: PRN  N: Renal Diet  GI: PPI  DVT: Heparin  Code: Full Code       Code Status: Full Code   Emergency Contact: Extended Emergency Contact Information  Primary Emergency Contact: Brandon Rousseau  Address: [address] [place] [address] [facility]  Home Phone: [phone]  Mobile Phone: [phone]  Relation: Significant Other  Secondary Emergency Contact: MELINDACAROLYN  Mobile Phone: [phone]  Relation: Daughter   needed? No  PCP: Mone Aquino MD      Patient discussed with attending physician Dr. Bailon  Plan preliminary until cosigned by attending physician.    Reinaldo Burden MD  Family Medicine  PGY-3            [1] aspirin, 81 mg, oral, Daily  atorvastatin, 40 mg, oral, Daily  B complex-vitamin C, 1 tablet, oral, Daily  carvedilol, 12.5 mg, oral, BID  ciprofloxacin, 250 mg, oral, Daily  ferrous sulfate, 65 mg of elemental iron, oral, Daily with breakfast  [Held by provider] gabapentin, 100 mg, oral, Nightly  heparin (porcine), 5,000 Units, subcutaneous, q8h  hydrALAZINE, 50 mg, oral, TID  insulin lispro, 0-5 Units, subcutaneous, TID AC  isosorbide mononitrate ER, 30 mg, oral, Daily  pantoprazole, 40 mg, oral, Daily before breakfast  polyethylene glycol, 17 g, oral, Daily  sennosides-docusate sodium, 1 tablet, oral, BID  sodium bicarbonate, 1,300  mg, oral, BID  tamsulosin, 0.8 mg, oral, Daily  torsemide, 40 mg, oral, Daily     [2]    [3] PRN medications: acetaminophen, dextrose, dextrose, glucagon, glucagon

## 2025-08-07 NOTE — CARE PLAN
The clinical goals for the shift include Pt will remain without falls during shift    Over the shift, the patient did not make progress toward the following goals. Barriers to progression include ongoing abdominal pain that has worsened. Recommendations to address these barriers include treat symptoms, encourage fluids and rest.

## 2025-08-07 NOTE — CARE PLAN
Ms. Squires is with improving labs: Cr decreased from 4.21 yesterday to 3.95 today and BUN decreased from 92 to 90. Please make sure that Ms. Squires follows up with her outpatient renal provider, Nuris Gonzalez, on discharge. Renal will sign off at this time.     Daytime / Weekend Renal Pager [phone]  After 7 pm Emergencies [phone] Pager [phone]

## 2025-08-07 NOTE — PROGRESS NOTES
08/07/25 1247   Rapid Rounds   Attendance Provider;Care Transitions   Expected Discharge Disposition Home H  ([facility])   Today we still await: Procedure (Comment)  (Imaging)   Review at Escalation Rounds No escalation needed     Transitional Care Coordinator Progress Note:   Pt still has abdominal pain, plan to obtain imaging.  ADOD is 8/8.  Pt will discharge home with [facility] for PT ([facility]). Requested provider to send HC orders. Pt would like meds to beds.  Pt's significant other will provide transportation home.  IMM was given 8/6 10:51am. Care coordinator will continue to follow for discharge planning needs.     Addendum 1617:  HC orders were sent to [facility] for PT, updated [facility] on ADOD.  IMM was given today at 1502.    Nina Warren MSN, RN-BC  Transitional Care Coordinator (TCC)  [phone]

## 2025-08-07 NOTE — CARE PLAN
The patient's goals for the shift include  go home.    The clinical goals for the shift include Patient will remain HDS during shift    Over the shift, the patient did not make progress toward the following goals. Barriers to progression include refusing dialysis. Recommendations to address these barriers include educate on importance of dialysis with CKD stage 4.

## 2025-08-08 ENCOUNTER — DOCUMENTATION (OUTPATIENT)
Dept: HOME HEALTH SERVICES | Facility: HOME HEALTH | Age: 60
End: 2025-08-08
Payer: COMMERCIAL

## 2025-08-08 ENCOUNTER — PHARMACY VISIT (OUTPATIENT)
Dept: PHARMACY | Facility: CLINIC | Age: 60
End: 2025-08-08
Payer: COMMERCIAL

## 2025-08-08 VITALS
BODY MASS INDEX: 36.25 KG/M2 | HEIGHT: 62 IN | TEMPERATURE: 97.9 F | HEART RATE: 68 BPM | RESPIRATION RATE: 18 BRPM | SYSTOLIC BLOOD PRESSURE: 143 MMHG | DIASTOLIC BLOOD PRESSURE: 56 MMHG | OXYGEN SATURATION: 97 % | WEIGHT: 197 LBS

## 2025-08-08 LAB
ALBUMIN SERPL BCP-MCNC: 3.4 G/DL (ref 3.4–5)
ANION GAP SERPL CALC-SCNC: 13 MMOL/L (ref 10–20)
BASOPHILS # BLD AUTO: 0.04 X10*3/UL (ref 0–0.1)
BASOPHILS NFR BLD AUTO: 0.7 %
BUN SERPL-MCNC: 89 MG/DL (ref 6–23)
CALCIUM SERPL-MCNC: 9 MG/DL (ref 8.6–10.6)
CHLORIDE SERPL-SCNC: 110 MMOL/L (ref 98–107)
CO2 SERPL-SCNC: 21 MMOL/L (ref 21–32)
CREAT SERPL-MCNC: 4.02 MG/DL (ref 0.5–1.05)
EGFRCR SERPLBLD CKD-EPI 2021: 12 ML/MIN/1.73M*2
EOSINOPHIL # BLD AUTO: 0.11 X10*3/UL (ref 0–0.7)
EOSINOPHIL NFR BLD AUTO: 1.9 %
ERYTHROCYTE [DISTWIDTH] IN BLOOD BY AUTOMATED COUNT: 14.6 % (ref 11.5–14.5)
GLUCOSE BLD MANUAL STRIP-MCNC: 130 MG/DL (ref 74–99)
GLUCOSE BLD MANUAL STRIP-MCNC: 154 MG/DL (ref 74–99)
GLUCOSE SERPL-MCNC: 140 MG/DL (ref 74–99)
HCT VFR BLD AUTO: 29.6 % (ref 36–46)
HGB BLD-MCNC: 9.3 G/DL (ref 12–16)
IMM GRANULOCYTES # BLD AUTO: 0.01 X10*3/UL (ref 0–0.7)
IMM GRANULOCYTES NFR BLD AUTO: 0.2 % (ref 0–0.9)
LYMPHOCYTES # BLD AUTO: 2.29 X10*3/UL (ref 1.2–4.8)
LYMPHOCYTES NFR BLD AUTO: 40.5 %
MAGNESIUM SERPL-MCNC: 2.16 MG/DL (ref 1.6–2.4)
MCH RBC QN AUTO: 29.7 PG (ref 26–34)
MCHC RBC AUTO-ENTMCNC: 31.4 G/DL (ref 32–36)
MCV RBC AUTO: 95 FL (ref 80–100)
MONOCYTES # BLD AUTO: 0.52 X10*3/UL (ref 0.1–1)
MONOCYTES NFR BLD AUTO: 9.2 %
NEUTROPHILS # BLD AUTO: 2.69 X10*3/UL (ref 1.2–7.7)
NEUTROPHILS NFR BLD AUTO: 47.5 %
NRBC BLD-RTO: 0 /100 WBCS (ref 0–0)
PHOSPHATE SERPL-MCNC: 3.6 MG/DL (ref 2.5–4.9)
PLATELET # BLD AUTO: 212 X10*3/UL (ref 150–450)
POTASSIUM SERPL-SCNC: 3.9 MMOL/L (ref 3.5–5.3)
RBC # BLD AUTO: 3.13 X10*6/UL (ref 4–5.2)
SODIUM SERPL-SCNC: 140 MMOL/L (ref 136–145)
WBC # BLD AUTO: 5.7 X10*3/UL (ref 4.4–11.3)

## 2025-08-08 PROCEDURE — 2500000001 HC RX 250 WO HCPCS SELF ADMINISTERED DRUGS (ALT 637 FOR MEDICARE OP)

## 2025-08-08 PROCEDURE — RXMED WILLOW AMBULATORY MEDICATION CHARGE

## 2025-08-08 PROCEDURE — 2500000001 HC RX 250 WO HCPCS SELF ADMINISTERED DRUGS (ALT 637 FOR MEDICARE OP): Performed by: STUDENT IN AN ORGANIZED HEALTH CARE EDUCATION/TRAINING PROGRAM

## 2025-08-08 PROCEDURE — 80069 RENAL FUNCTION PANEL: CPT

## 2025-08-08 PROCEDURE — 2500000002 HC RX 250 W HCPCS SELF ADMINISTERED DRUGS (ALT 637 FOR MEDICARE OP, ALT 636 FOR OP/ED): Performed by: STUDENT IN AN ORGANIZED HEALTH CARE EDUCATION/TRAINING PROGRAM

## 2025-08-08 PROCEDURE — 85025 COMPLETE CBC W/AUTO DIFF WBC: CPT

## 2025-08-08 PROCEDURE — 36415 COLL VENOUS BLD VENIPUNCTURE: CPT

## 2025-08-08 PROCEDURE — 83735 ASSAY OF MAGNESIUM: CPT

## 2025-08-08 PROCEDURE — 99238 HOSP IP/OBS DSCHRG MGMT 30/<: CPT | Performed by: STUDENT IN AN ORGANIZED HEALTH CARE EDUCATION/TRAINING PROGRAM

## 2025-08-08 PROCEDURE — 2500000004 HC RX 250 GENERAL PHARMACY W/ HCPCS (ALT 636 FOR OP/ED)

## 2025-08-08 PROCEDURE — 82947 ASSAY GLUCOSE BLOOD QUANT: CPT

## 2025-08-08 PROCEDURE — 2500000004 HC RX 250 GENERAL PHARMACY W/ HCPCS (ALT 636 FOR OP/ED): Performed by: STUDENT IN AN ORGANIZED HEALTH CARE EDUCATION/TRAINING PROGRAM

## 2025-08-08 RX ORDER — ALUMINUM HYDROXIDE, MAGNESIUM HYDROXIDE, AND SIMETHICONE 1200; 120; 1200 MG/30ML; MG/30ML; MG/30ML
20 SUSPENSION ORAL
Status: DISCONTINUED | OUTPATIENT
Start: 2025-08-08 | End: 2025-08-08 | Stop reason: HOSPADM

## 2025-08-08 RX ORDER — POLYETHYLENE GLYCOL 3350 17 G/17G
17 POWDER, FOR SOLUTION ORAL 2 TIMES DAILY
Status: DISCONTINUED | OUTPATIENT
Start: 2025-08-08 | End: 2025-08-08 | Stop reason: HOSPADM

## 2025-08-08 RX ORDER — CIPROFLOXACIN 250 MG/1
250 TABLET, FILM COATED ORAL DAILY
Qty: 3 TABLET | Refills: 0 | Status: SHIPPED | OUTPATIENT
Start: 2025-08-08 | End: 2025-08-11

## 2025-08-08 RX ORDER — PANTOPRAZOLE SODIUM 40 MG/1
40 TABLET, DELAYED RELEASE ORAL
Qty: 90 TABLET | Refills: 0 | Status: SHIPPED | OUTPATIENT
Start: 2025-08-09

## 2025-08-08 RX ORDER — ACETAMINOPHEN 500 MG
1000 TABLET ORAL EVERY 8 HOURS PRN
Qty: 180 TABLET | Refills: 0 | Status: SHIPPED | OUTPATIENT
Start: 2025-08-08 | End: 2025-09-07

## 2025-08-08 RX ORDER — ALUMINUM HYDROXIDE, MAGNESIUM HYDROXIDE, AND SIMETHICONE 1200; 120; 1200 MG/30ML; MG/30ML; MG/30ML
10 SUSPENSION ORAL
Qty: 355 ML | Refills: 0 | Status: SHIPPED | OUTPATIENT
Start: 2025-08-08

## 2025-08-08 RX ORDER — POLYETHYLENE GLYCOL 3350 17 G/17G
17 POWDER, FOR SOLUTION ORAL 2 TIMES DAILY
Qty: 510 G | Refills: 0 | Status: SHIPPED | OUTPATIENT
Start: 2025-08-08

## 2025-08-08 RX ORDER — AMOXICILLIN 250 MG
1 CAPSULE ORAL 2 TIMES DAILY
Qty: 60 TABLET | Refills: 0 | Status: SHIPPED | OUTPATIENT
Start: 2025-08-08 | End: 2025-09-07

## 2025-08-08 RX ADMIN — ISOSORBIDE MONONITRATE 30 MG: 30 TABLET, EXTENDED RELEASE ORAL at 08:41

## 2025-08-08 RX ADMIN — PANTOPRAZOLE SODIUM 40 MG: 40 TABLET, DELAYED RELEASE ORAL at 06:58

## 2025-08-08 RX ADMIN — CARVEDILOL 12.5 MG: 12.5 TABLET, FILM COATED ORAL at 08:41

## 2025-08-08 RX ADMIN — FERROUS SULFATE TAB 325 MG (65 MG ELEMENTAL FE) 1 TABLET: 325 (65 FE) TAB at 08:41

## 2025-08-08 RX ADMIN — HEPARIN SODIUM 5000 UNITS: 5000 INJECTION INTRAVENOUS; SUBCUTANEOUS at 08:49

## 2025-08-08 RX ADMIN — SODIUM BICARBONATE 1300 MG: 650 TABLET ORAL at 08:41

## 2025-08-08 RX ADMIN — ASPIRIN 81 MG: 81 TABLET, COATED ORAL at 08:41

## 2025-08-08 RX ADMIN — ATORVASTATIN CALCIUM 40 MG: 40 TABLET, FILM COATED ORAL at 08:41

## 2025-08-08 RX ADMIN — TAMSULOSIN HYDROCHLORIDE 0.8 MG: 0.4 CAPSULE ORAL at 08:41

## 2025-08-08 RX ADMIN — TORSEMIDE 40 MG: 20 TABLET ORAL at 08:41

## 2025-08-08 RX ADMIN — HYDRALAZINE HYDROCHLORIDE 50 MG: 50 TABLET ORAL at 08:41

## 2025-08-08 RX ADMIN — ALUMINUM HYDROXIDE, MAGNESIUM HYDROXIDE, AND SIMETHICONE 20 ML: 200; 200; 20 SUSPENSION ORAL at 08:52

## 2025-08-08 RX ADMIN — SENNOSIDES AND DOCUSATE SODIUM 1 TABLET: 8.6; 5 TABLET ORAL at 08:41

## 2025-08-08 RX ADMIN — POLYETHYLENE GLYCOL 3350 17 G: 17 POWDER, FOR SOLUTION ORAL at 08:41

## 2025-08-08 RX ADMIN — Medication 1 TABLET: at 08:41

## 2025-08-08 ASSESSMENT — PAIN SCALES - GENERAL: PAINLEVEL_OUTOF10: 0 - NO PAIN

## 2025-08-08 NOTE — DISCHARGE SUMMARY
Discharge Diagnosis  VENKAT (acute kidney injury)  Constipation  Stercoralcolitis    Issues Requiring Follow-Up  Follow up with PCP for ongoing constipation management  Follow up with Nephrology    Discharge Meds     Medication List      START taking these medications     ciprofloxacin 250 mg tablet; Commonly known as: Cipro; Take 1 tablet   (250 mg) by mouth once daily for 3 doses.   Sapna-Lanta 200-200-20 mg/5 mL oral suspension; Generic drug: alum-mag   hydroxide-simeth; Take 10 mL by mouth 4 times a day with meals.   pantoprazole 40 mg EC tablet; Commonly known as: ProtoNix; Take 1 tablet   (40 mg) by mouth once daily in the morning. Take before meals. Do not   crush, chew, or split.; Start taking on: August 9, 2025   polyethylene glycol 17 gram/dose powder; Commonly known as: Glycolax,   Miralax; Mix 17 g of powder and drink 2 times a day.   sennosides-docusate sodium 8.6-50 mg tablet; Commonly known as:   Doris-Colace; Take 1 tablet by mouth 2 times a day.     CHANGE how you take these medications     * acetaminophen 325 mg tablet; Commonly known as: Tylenol; Take 2   tablets (650 mg) by mouth every 6 hours if needed (pain).; What changed:   Another medication with the same name was added. Make sure you understand   how and when to take each.   * acetaminophen 500 mg tablet; Commonly known as: Tylenol Extra   Strength; Take 2 tablets (1,000 mg) by mouth every 8 hours if needed for   moderate pain (4 - 6).; What changed: You were already taking a medication   with the same name, and this prescription was added. Make sure you   understand how and when to take each.  * This list has 2 medication(s) that are the same as other medications   prescribed for you. Read the directions carefully, and ask your doctor or   other care provider to review them with you.     CONTINUE taking these medications     aspirin 81 mg EC tablet   atorvastatin 40 mg tablet; Commonly known as: Lipitor; Take 1 tablet (40   mg) by mouth once  "daily.   B complex-vitamin C-folic acid 0.8 mg tablet; Commonly known as:   Nephro-Duke; Take 1 tablet by mouth once daily.   Basaglar KwikPen U-100 Insulin 100 unit/mL (3 mL) pen; Generic drug:   insulin glargine; Inject 10 Units under the skin once daily in the   morning. Take as directed per insulin instructions.   carvedilol 12.5 mg tablet; Commonly known as: Coreg; Take 1 tablet (12.5   mg) by mouth 2 times a day.   ferrous sulfate 325 mg (65 mg elemental) tablet; Take 1 tablet by mouth   once daily with breakfast.   FreeStyle Remington 3 Gattman misc; Generic drug:   blood-glucose,,cont; Use as instructed to monitor blood glucose.   FreeStyle Remington 3 Sensor device; Generic drug: blood-glucose sensor; Use   to monitor blood glucose. Change sensor every 14 days.   gabapentin 100 mg capsule; Commonly known as: Neurontin   glucagon 3 mg/actuation spray,non-aerosol; Administer 1 spray into   affected nostril(s) every 15 minutes if needed (symptomatic low blood   sugar <70 and unable to take orals).   hydrALAZINE 50 mg tablet; Commonly known as: Apresoline; Take 1 tablet   (50 mg) by mouth 3 times a day.   insulin lispro 100 unit/mL pen; Commonly known as: Admelog SoloStar   U-100 Insulin; Inject per sliding scale instructions under the skin 3   times a day with meals and bedtime. 1 units for every 50 of glucose >150   isosorbide mononitrate ER 30 mg 24 hr tablet; Commonly known as: Imdur;   Take 1 tablet (30 mg) by mouth once daily. Do not crush or chew.   Mounjaro 5 mg/0.5 mL pen injector; Generic drug: tirzepatide; Inject 5   mg under the skin 1 (one) time per week.   OneTouch Delica Plus Lancet 30 gauge misc; Generic drug: lancets; USE TO   TEST BLOOD SUGAR AS DIRECTED   OneTouch Ultra Test; Generic drug: blood sugar diagnostic; Use 1 strip   BID to check glucose   OneTouch Ultra2 Meter misc; Generic drug: blood-glucose meter; use 1 to   2 times daily   pen needle, diabetic 32 gauge x 5/32\" needle; Use four " times a day with   insulin use   tamsulosin 0.4 mg 24 hr capsule; Commonly known as: Flomax; Take 2   capsules (0.8 mg) by mouth once daily. Do not crush, chew, or split.   torsemide 20 mg tablet; Commonly known as: Demadex; Take 2 tablets (40   mg) by mouth once daily.       Test Results Pending At Discharge  Pending Labs       No current pending labs.            Hospital Course  Nuris Squires is a 59 yo Female with a PMHx of CKD stage 4, DM, HTN, HFpEF, HLD, PAD, MO, multiple foot infections, multiple hospital admissions over past 18mths for infections and ADHF frequently with associated VENKAT on CKD who presents to [facility] ED at the request due to worsening labs indicated renal dysfunction. Review of her history demonstrates a history of progressively worsening CKD with a recent worsening in labs, with BUN now hovering around ~100. The patient's primary symptomatic concern at this time is her abdominal pain. No noted hematochezia, hemoptysis, or hematemesis at this time. Nephro consulted for evaluation of dialysis initiation. Patient had BUN and Cr improvement, Nephrology rec no urgent dialysis, and signed off. Abdominal pain waxed and waned. Patient urine studies concerning for UTI, for which she was given 2 days of cefepime before transitioning to Cipro to complete a 7 day course based on unrevealing urine culture after she unfortunately had several doses of antibiotics; ciprofloxacin picked based on prior cultures, and renally dosed. . CT AP showed c/f stercoral colitis, no peritonitis, acute abdomen or hemodynamic instability. Patient given Miralax and Colace BID and had normal caliber BM. Patient offered Enema/suppository, however, declined as her pain was manageable and she felt she could continue PO bowel regimen. During this time, Ms Squires has been tolerating PO intake and has had no episodes of nausea or vomiting. She remains afebrile, hemodynamically stable, and is no appropriate for discharge with  referral placed to GI. Patient will have medications delivered to bedside and will be set up with  Home Care.    Pertinent Physical Exam At Time of Discharge  Gen: WN/WD, no acute distress  HEENT: NC/AT, moist mucus membranes  Neck: Supple, full ROM  CV: RRR, s1, s2, no m/r/g or heaves  Pul: Norm. WOB, lungs CTA bilaterally, no rales/wheezes  Abd: Soft, mild tenderness in epigastrum, normal active bowel sounds  Ext: norm. musc. tone, no significant lower ext. edema  Integ: warm, moist skin, no open sores or rashes noted  Neuro: no facial droop, normal speech, no focal motor deficits noted  Psych: Norm. mood/affect with good insight    Outpatient Follow-Up  Future Appointments   Date Time Provider Department [place]   8/25/2025 10:30 AM Mone Aquino MD [id number] [facility]   10/30/2025  1:00 PM Barrear Rodriguez MD [id number] [facility]   1/26/2026 10:15 AM Josue Stanton MD [facility]   5/15/2026 11:20 AM Marylin Sparks MD MPH [id number] [facility] spent < 30 minutes on patient's discharge on the day of discharge.    Yoly Bailon MD

## 2025-08-08 NOTE — PROGRESS NOTES
Social Work Note:     08/08/25 1055   Rapid Rounds   Attendance Provider;Care Transitions   Expected Discharge Disposition Home H   Review at Escalation Rounds No escalation needed     LISW spoke with the patient's MD on this date who reported that patient is medically ready today.  LISW sent [facility] Intake a message to confirm SOC.  LISW was told that SOC would be within 48 hours. IMM was given by previous TCC yesterday.  LISW will continue to follow as needed.  QUEENIE Chua, LISW-S

## 2025-08-08 NOTE — HOSPITAL COURSE
Nuris Squires is a 59 yo Female with a PMHx of CKD stage 4, DM, HTN, HFpEF, HLD, PAD, MO, multiple foot infections, multiple hospital admissions over past 18mths for infections and ADHF frequently with associated VENKAT on CKD who presents to [facility] ED at the request due to worsening labs indicated renal dysfunction. Review of her history demonstrates a history of progressively worsening CKD with a recent worsening in labs, with BUN now hovering around ~100. The patient's primary symptomatic concern at this time is her abdominal pain. No noted hematochezia, hemoptysis, or hematemesis at this time. Nephro consulted for evaluation of dialysis initiation. Patient had BUN and Cr improvement, Nephrology rec no urgent dialysis, and signed off. Abdominal pain waxed and waned. Patient urine studies concerning for UTI, for which she was given 2 days of cefepime before transitioning to Cipro to complete a 7 day course based on unrevealing urine culture after she unfortunately had several doses of antibiotics; ciprofloxacin picked based on prior cultures, and renally dosed. . CT AP showed c/f stercoral colitis, no peritonitis, acute abdomen or hemodynamic instability. Patient given Miralax and Colace BID and had normal caliber BM. Patient offered Enema/suppository, however, declined as her pain was manageable and she felt she could continue PO bowel regimen. During this time, Ms Squires has been tolerating PO intake and has had no episodes of nausea or vomiting. She remains afebrile, hemodynamically stable, and is no appropriate for discharge with referral placed to GI. Patient will have medications delivered to bedside and will be set up with  Home Care.

## 2025-08-08 NOTE — HH CARE COORDINATION
Home Care received a Referral for Physical Therapy. We have processed the referral for a Start of Care within 48 hours of 8/8.     If you have any questions or concerns, please feel free to contact us at [phone]. Follow the prompts, enter your five digit zip code, and you will be directed to your care team on [employer] [facility].

## 2025-08-08 NOTE — NURSING NOTE
Pt discharged home. Peripheral iv removed. All patient belongings sent with pt. After visit summary reviewed with patient. Patient verbalized understanding.

## 2025-08-08 NOTE — CARE PLAN
The patient's goals for the shift include      The clinical goals for the shift include Pt to be free from falls      Problem: Pain - Adult  Goal: Verbalizes/displays adequate comfort level or baseline comfort level  Outcome: Progressing     Problem: Skin  Goal: Decreased wound size/increased tissue granulation at next dressing change  Outcome: Progressing  Flowsheets (Taken 8/8/2025 1204)  Decreased wound size/increased tissue granulation at next dressing change: Protective dressings over bony prominences  Goal: Participates in plan/prevention/treatment measures  Outcome: Progressing  Flowsheets (Taken 8/8/2025 1204)  Participates in plan/prevention/treatment measures: Elevate heels  Goal: Prevent/manage excess moisture  Outcome: Progressing  Flowsheets (Taken 8/8/2025 1204)  Prevent/manage excess moisture: Cleanse incontinence/protect with barrier cream  Goal: Prevent/minimize sheer/friction injuries  Outcome: Progressing  Flowsheets (Taken 8/8/2025 1204)  Prevent/minimize sheer/friction injuries:   Use pull sheet   HOB 30 degrees or less  Goal: Promote/optimize nutrition  Outcome: Progressing  Flowsheets (Taken 8/8/2025 1204)  Promote/optimize nutrition: Consume > 50% meals/supplements  Goal: Promote skin healing  Outcome: Progressing  Flowsheets (Taken 8/8/2025 1204)  Promote skin healing: Assess skin/pad under line(s)/device(s)

## 2025-08-11 ENCOUNTER — PATIENT OUTREACH (OUTPATIENT)
Dept: PRIMARY CARE | Facility: CLINIC | Age: 60
End: 2025-08-11
Payer: COMMERCIAL

## 2025-08-12 ENCOUNTER — HOME CARE VISIT (OUTPATIENT)
Dept: HOME HEALTH SERVICES | Facility: HOME HEALTH | Age: 60
End: 2025-08-12
Payer: COMMERCIAL

## 2025-08-12 VITALS — DIASTOLIC BLOOD PRESSURE: 77 MMHG | HEART RATE: 74 BPM | SYSTOLIC BLOOD PRESSURE: 136 MMHG

## 2025-08-12 PROCEDURE — G0151 HHCP-SERV OF PT,EA 15 MIN: HCPCS

## 2025-08-12 SDOH — HEALTH STABILITY: PHYSICAL HEALTH: PHYSICAL EXERCISE: SITTING

## 2025-08-12 SDOH — HEALTH STABILITY: PHYSICAL HEALTH: EXERCISE COMMENTS: SEATED HEP INCLUDES ANKLE PUMPS, LAQS, HIP FLEXION, HEELSLIDES, AND HIP ABDUCTION

## 2025-08-12 SDOH — HEALTH STABILITY: PHYSICAL HEALTH: EXERCISE ACTIVITY: SEATED HEP

## 2025-08-12 SDOH — ECONOMIC STABILITY: HOUSING INSECURITY: OPEN FLAME PRESENT: 1

## 2025-08-12 SDOH — HEALTH STABILITY: PHYSICAL HEALTH: EXERCISE ACTIVITIES SETS: 1

## 2025-08-12 SDOH — HEALTH STABILITY: PHYSICAL HEALTH: PHYSICAL EXERCISE: 10

## 2025-08-12 SDOH — HEALTH STABILITY: PHYSICAL HEALTH: EXERCISE TYPE: SEATED HEP

## 2025-08-12 ASSESSMENT — ENCOUNTER SYMPTOMS
PAIN LOCATION - EXACERBATING FACTORS: PROLONGED STANDING
ANGER WITHIN DEFINED LIMITS: 1
PERSON REPORTING PAIN: PATIENT
LOSS OF SENSATION IN FEET: 1
PAIN LOCATION - RELIEVING FACTORS: MEDS
DYSPNEA ON EXERTION: 1
FATIGUES EASILY: 1
PAIN SEVERITY GOAL: 3/10
ARTHRALGIAS: 1
AGGRESSION WITHIN DEFINED LIMITS: 1
PAIN: 1
LOWEST PAIN SEVERITY IN PAST 24 HOURS: 4/10
HYPERTENSION: 1
SUBJECTIVE PAIN PROGRESSION: WAXING AND WANING
PAIN LOCATION - PAIN SEVERITY: 4/10
OCCASIONAL FEELINGS OF UNSTEADINESS: 1
PAIN LOCATION: LEFT KNEE
MUSCLE WEAKNESS: 1
DEPRESSION: 0
SLEEP QUALITY: ADEQUATE
SHORTNESS OF BREATH: T
LIMITED RANGE OF MOTION: 1
PAIN LOCATION - PAIN FREQUENCY: FREQUENT
HIGHEST PAIN SEVERITY IN PAST 24 HOURS: 7/10

## 2025-08-12 ASSESSMENT — ACTIVITIES OF DAILY LIVING (ADL)
AMBULATION ASSISTANCE: 1
AMBULATION ASSISTANCE ON FLAT SURFACES: 1
OASIS_M1830: 05
ENTERING_EXITING_HOME: MODERATE ASSIST
CURRENT_FUNCTION: SUPERVISION
CURRENT_FUNCTION: ONE PERSON
AMBULATION ASSISTANCE: ONE PERSON
PHYSICAL TRANSFERS ASSESSED: 1
AMBULATION ASSISTANCE: STAND BY ASSIST

## 2025-08-12 ASSESSMENT — BALANCE ASSESSMENTS
REACHING FORWARD WITH OUTSTRETCHED ARM WHILE STANDING: 0 - LOSES BALANCE WHILE TRYING/REQUIRES EXTERNAL SUPPORT
PICK UP OBJECT FROM THE FLOOR FROM A STANDING POSITION: 0 - UNABLE TO TRY/NEEDS ASSIST TO KEEP FROM LOSING BALANCE OR FALLING
SITTING TO STANDING: 2 - ABLE TO STAND USING HANDS AFTER SEVERAL TRIES
TRANSFERS: 2
STANDING UNSUPPORTED ONE FOOT IN FRONT: 0 - LOSES BALANCE WHILE STEPPING OR STANDING
STANDING TO SITTING: 2 - USES BACK OF LEGS AGAINST CHAIR TO CONTROL DESCENT
TURN 360 DEGREES: 0 - NEEDS ASSISTANCE WHILE TURNING
STANDING ON ONE LEG: 0 - UNABLE TO TRY OR NEEDS ASSIST TO PREVENT FALL
STANDING UNSUPPORTED WITH FEET TOGETHER: 0
LONG VERSION TOTAL SCORE (MAX 56): 11
STANDING ON ONE LEG: 0
STANDING UNSUPPORTED WITH FEET TOGETHER: 0 - NEEDS HELP TO ATTAIN POSITION AND UNABLE TO HOLD FOR 15 SECONDS
STANDING UNSUPPORTED ONE FOOT IN FRONT: 0
COMMENTS: HIGH FALL RISK
SITTING TO STANDING: 2
STANDING UNSUPPORTED: 1
STANDING UNSUPPORTED WITH EYES CLOSED: 0
TRANSFERS: 2 - ABLE TO TRANSFER WITH VERBAL CUING AND/OR SUPERVISION
STANDING UNSUPPORTED WITH EYES CLOSED: 0 - NEEDS HELP TO KEEP FROM FALLING
STANDING TO SITTING: 2
STANDING UNSUPPORTED: 1 - NEEDS SEVERAL TRIES TO STAND 30 SECONDS UNSUPPORTED
TURN 360 DEGREES: 0

## 2025-08-15 ENCOUNTER — HOME CARE VISIT (OUTPATIENT)
Dept: HOME HEALTH SERVICES | Facility: HOME HEALTH | Age: 60
End: 2025-08-15
Payer: COMMERCIAL

## 2025-08-15 PROCEDURE — G0151 HHCP-SERV OF PT,EA 15 MIN: HCPCS

## 2025-08-15 SDOH — HEALTH STABILITY: PHYSICAL HEALTH: EXERCISE ACTIVITY: SEATED HEP

## 2025-08-15 SDOH — HEALTH STABILITY: PHYSICAL HEALTH: PHYSICAL EXERCISE: STANDING

## 2025-08-15 SDOH — HEALTH STABILITY: PHYSICAL HEALTH: PHYSICAL EXERCISE: 15

## 2025-08-15 SDOH — ECONOMIC STABILITY: HOUSING INSECURITY: OPEN FLAME PRESENT: 1

## 2025-08-15 SDOH — HEALTH STABILITY: PHYSICAL HEALTH: EXERCISE ACTIVITY: STANDING HEP

## 2025-08-15 SDOH — HEALTH STABILITY: PHYSICAL HEALTH: EXERCISE ACTIVITIES SETS: 2

## 2025-08-15 SDOH — HEALTH STABILITY: PHYSICAL HEALTH
EXERCISE COMMENTS: SEATED HEP INCLUDES ANKLE PUMPS, LAQS, HIP FLEXION, HEELSLIDES, AND HIP ABDUCTION  STANDING HEP INCLUDES: CALF RAISES, KNEE FLEXION, HIP FLEXION, MARCHING, HIP ABDUCTION, HIP EXTENSION, AND MINI SQUATS

## 2025-08-15 SDOH — HEALTH STABILITY: PHYSICAL HEALTH: PHYSICAL EXERCISE: SITTING

## 2025-08-15 SDOH — HEALTH STABILITY: PHYSICAL HEALTH: EXERCISE TYPE: SEATED HEP

## 2025-08-15 SDOH — HEALTH STABILITY: PHYSICAL HEALTH: EXERCISE ACTIVITIES SETS: 1

## 2025-08-15 ASSESSMENT — ACTIVITIES OF DAILY LIVING (ADL)
AMBULATION ASSISTANCE: STAND BY ASSIST
AMBULATION_DISTANCE/DURATION_TOLERATED: 75 FEET WITH WHEELED WALKER AND SUPERVISION
AMBULATION ASSISTANCE: 1
AMBULATION ASSISTANCE ON FLAT SURFACES: 1
PHYSICAL TRANSFERS ASSESSED: 1
CURRENT_FUNCTION: INDEPENDENT
AMBULATION ASSISTANCE: ONE PERSON

## 2025-08-15 ASSESSMENT — ENCOUNTER SYMPTOMS
LIMITED RANGE OF MOTION: 1
LOSS OF SENSATION IN FEET: 1
MUSCLE WEAKNESS: 1
DENIES PAIN: 1
DEPRESSION: 0
ARTHRALGIAS: 1
OCCASIONAL FEELINGS OF UNSTEADINESS: 1
PAIN: NO PAIN REPORTED

## 2025-08-19 ENCOUNTER — APPOINTMENT (OUTPATIENT)
Dept: PRIMARY CARE | Facility: CLINIC | Age: 60
End: 2025-08-19
Payer: COMMERCIAL

## 2025-08-19 VITALS
HEART RATE: 76 BPM | BODY MASS INDEX: 38.31 KG/M2 | WEIGHT: 208.2 LBS | HEIGHT: 62 IN | TEMPERATURE: 95 F | DIASTOLIC BLOOD PRESSURE: 50 MMHG | SYSTOLIC BLOOD PRESSURE: 118 MMHG | OXYGEN SATURATION: 97 %

## 2025-08-19 DIAGNOSIS — I13.0 HYPERTENSIVE HEART AND KIDNEY DISEASE WITH HF AND WITH CKD STAGE IV: ICD-10-CM

## 2025-08-19 DIAGNOSIS — E78.2 MIXED HYPERLIPIDEMIA: ICD-10-CM

## 2025-08-19 DIAGNOSIS — N18.4 STAGE 4 CHRONIC KIDNEY DISEASE (MULTI): ICD-10-CM

## 2025-08-19 DIAGNOSIS — E11.3511 TYPE 2 DIABETES MELLITUS WITH RIGHT EYE AFFECTED BY PROLIFERATIVE RETINOPATHY AND MACULAR EDEMA, WITH LONG-TERM CURRENT USE OF INSULIN: Primary | ICD-10-CM

## 2025-08-19 DIAGNOSIS — Z89.432: ICD-10-CM

## 2025-08-19 DIAGNOSIS — N18.4 HYPERTENSIVE HEART AND KIDNEY DISEASE WITH HF AND WITH CKD STAGE IV: ICD-10-CM

## 2025-08-19 DIAGNOSIS — I10 ESSENTIAL HYPERTENSION: ICD-10-CM

## 2025-08-19 DIAGNOSIS — Z79.4 TYPE 2 DIABETES MELLITUS WITH RIGHT EYE AFFECTED BY PROLIFERATIVE RETINOPATHY AND MACULAR EDEMA, WITH LONG-TERM CURRENT USE OF INSULIN: Primary | ICD-10-CM

## 2025-08-19 PROCEDURE — 3051F HG A1C>EQUAL 7.0%<8.0%: CPT | Performed by: FAMILY MEDICINE

## 2025-08-19 PROCEDURE — 3078F DIAST BP <80 MM HG: CPT | Performed by: FAMILY MEDICINE

## 2025-08-19 PROCEDURE — 99212 OFFICE O/P EST SF 10 MIN: CPT | Performed by: FAMILY MEDICINE

## 2025-08-19 PROCEDURE — 3074F SYST BP LT 130 MM HG: CPT | Performed by: FAMILY MEDICINE

## 2025-08-19 PROCEDURE — 3008F BODY MASS INDEX DOCD: CPT | Performed by: FAMILY MEDICINE

## 2025-08-19 PROCEDURE — 1036F TOBACCO NON-USER: CPT | Performed by: FAMILY MEDICINE

## 2025-08-19 PROCEDURE — 99495 TRANSJ CARE MGMT MOD F2F 14D: CPT | Performed by: FAMILY MEDICINE

## 2025-08-19 RX ORDER — BLOOD-GLUCOSE SENSOR
EACH MISCELLANEOUS
Qty: 5 EACH | Refills: 3 | Status: SHIPPED | OUTPATIENT
Start: 2025-08-19

## 2025-08-19 ASSESSMENT — PATIENT HEALTH QUESTIONNAIRE - PHQ9
1. LITTLE INTEREST OR PLEASURE IN DOING THINGS: NOT AT ALL
SUM OF ALL RESPONSES TO PHQ9 QUESTIONS 1 AND 2: 0
2. FEELING DOWN, DEPRESSED OR HOPELESS: NOT AT ALL

## 2025-08-19 ASSESSMENT — ENCOUNTER SYMPTOMS
LOSS OF SENSATION IN FEET: 0
DEPRESSION: 0
OCCASIONAL FEELINGS OF UNSTEADINESS: 0

## 2025-08-20 ENCOUNTER — HOME CARE VISIT (OUTPATIENT)
Dept: HOME HEALTH SERVICES | Facility: HOME HEALTH | Age: 60
End: 2025-08-20
Payer: COMMERCIAL

## 2025-08-22 ENCOUNTER — HOME CARE VISIT (OUTPATIENT)
Dept: HOME HEALTH SERVICES | Facility: HOME HEALTH | Age: 60
End: 2025-08-22
Payer: COMMERCIAL

## 2025-08-22 ENCOUNTER — PATIENT OUTREACH (OUTPATIENT)
Dept: PRIMARY CARE | Facility: CLINIC | Age: 60
End: 2025-08-22
Payer: COMMERCIAL

## 2025-08-22 PROCEDURE — G0151 HHCP-SERV OF PT,EA 15 MIN: HCPCS

## 2025-08-22 SDOH — HEALTH STABILITY: PHYSICAL HEALTH: EXERCISE ACTIVITY: STANDING HEP

## 2025-08-22 SDOH — HEALTH STABILITY: PHYSICAL HEALTH
EXERCISE COMMENTS: BALANCE PROGRAM ACTIVITIES INCLUDE:  SIDESTEPPING, FORWARD AND RETRO MARCHING, BACKWARD WALKING, TANDEM STANCE FOOT POSITIONS WITH ALTERNATING TRUNK ROTATIONS WITH REACHING, SINGLE LEG STANCE BALANCE ACTIVITIES. ALSO PERFORMED SLS BALANCE ON EACH LE

## 2025-08-22 SDOH — HEALTH STABILITY: PHYSICAL HEALTH: EXERCISE TYPE: DYNAMIC BALANCE AND STANDING HEP

## 2025-08-22 SDOH — HEALTH STABILITY: PHYSICAL HEALTH: EXERCISE COMMENTS: AS WELL AS LATERAL WEIGHT SHIFTING

## 2025-08-22 SDOH — HEALTH STABILITY: PHYSICAL HEALTH: PHYSICAL EXERCISE: STANDING

## 2025-08-22 SDOH — ECONOMIC STABILITY: HOUSING INSECURITY: OPEN FLAME PRESENT: 1

## 2025-08-22 SDOH — HEALTH STABILITY: PHYSICAL HEALTH: PHYSICAL EXERCISE: 15

## 2025-08-22 SDOH — HEALTH STABILITY: PHYSICAL HEALTH: EXERCISE ACTIVITIES SETS: 2

## 2025-08-22 ASSESSMENT — ENCOUNTER SYMPTOMS
OCCASIONAL FEELINGS OF UNSTEADINESS: 1
DEPRESSION: 0
MUSCLE WEAKNESS: 1
DENIES PAIN: 1
LOSS OF SENSATION IN FEET: 1
PAIN: NO PAIN REPORTED TODAY
ARTHRALGIAS: 1
LIMITED RANGE OF MOTION: 1

## 2025-08-22 ASSESSMENT — ACTIVITIES OF DAILY LIVING (ADL)
AMBULATION ASSISTANCE: ONE PERSON
AMBULATION ASSISTANCE: STAND BY ASSIST
AMBULATION ASSISTANCE ON FLAT SURFACES: 1
AMBULATION_DISTANCE/DURATION_TOLERATED: 100 FEET WITH WHEELED WALKER AND SUPERVISION
CURRENT_FUNCTION: INDEPENDENT
PHYSICAL TRANSFERS ASSESSED: 1
AMBULATION ASSISTANCE: 1

## 2025-08-25 ENCOUNTER — APPOINTMENT (OUTPATIENT)
Dept: PRIMARY CARE | Facility: CLINIC | Age: 60
End: 2025-08-25
Payer: COMMERCIAL

## 2025-08-26 ENCOUNTER — HOME CARE VISIT (OUTPATIENT)
Dept: HOME HEALTH SERVICES | Facility: HOME HEALTH | Age: 60
End: 2025-08-26
Payer: COMMERCIAL

## 2025-08-26 PROCEDURE — G0151 HHCP-SERV OF PT,EA 15 MIN: HCPCS

## 2025-08-26 SDOH — HEALTH STABILITY: PHYSICAL HEALTH: EXERCISE ACTIVITY: STANDING HEP

## 2025-08-26 SDOH — ECONOMIC STABILITY: HOUSING INSECURITY: OPEN FLAME PRESENT: 1

## 2025-08-26 SDOH — HEALTH STABILITY: PHYSICAL HEALTH: EXERCISE TYPE: SEATED AND STANDING HEP

## 2025-08-26 SDOH — HEALTH STABILITY: PHYSICAL HEALTH: EXERCISE ACTIVITIES SETS: 2

## 2025-08-26 SDOH — HEALTH STABILITY: PHYSICAL HEALTH
EXERCISE COMMENTS: STANDING HEP INCLUDES: CALF RAISES, KNEE FLEXION, HIP FLEXION, MARCHING, HIP ABDUCTION, HIP EXTENSION, AND MINI SQUATS  SEATED HEP INCLUDES ANKLE PUMPS, LAQS, HIP FLEXION, AND HIP ABDUCTION

## 2025-08-26 SDOH — HEALTH STABILITY: PHYSICAL HEALTH: EXERCISE ACTIVITY: SEATED HEP

## 2025-08-26 SDOH — HEALTH STABILITY: PHYSICAL HEALTH: PHYSICAL EXERCISE: 10

## 2025-08-26 SDOH — HEALTH STABILITY: PHYSICAL HEALTH: PHYSICAL EXERCISE: 15

## 2025-08-26 SDOH — HEALTH STABILITY: PHYSICAL HEALTH: PHYSICAL EXERCISE: STANDING

## 2025-08-26 SDOH — HEALTH STABILITY: PHYSICAL HEALTH: PHYSICAL EXERCISE: SITTING

## 2025-08-26 ASSESSMENT — ACTIVITIES OF DAILY LIVING (ADL)
AMBULATION ASSISTANCE: ONE PERSON
PHYSICAL TRANSFERS ASSESSED: 1
AMBULATION ASSISTANCE ON FLAT SURFACES: 1
AMBULATION ASSISTANCE: 1
CURRENT_FUNCTION: INDEPENDENT
AMBULATION ASSISTANCE: STAND BY ASSIST

## 2025-08-26 ASSESSMENT — ENCOUNTER SYMPTOMS
LIMITED RANGE OF MOTION: 1
OCCASIONAL FEELINGS OF UNSTEADINESS: 1
LOSS OF SENSATION IN FEET: 1
DEPRESSION: 0
DENIES PAIN: 1
PAIN: NO PAIN REPORTED TODAY
ARTHRALGIAS: 1
MUSCLE WEAKNESS: 1

## 2025-08-29 ENCOUNTER — HOME CARE VISIT (OUTPATIENT)
Dept: HOME HEALTH SERVICES | Facility: HOME HEALTH | Age: 60
End: 2025-08-29
Payer: COMMERCIAL

## 2025-09-02 ENCOUNTER — APPOINTMENT (OUTPATIENT)
Dept: NEPHROLOGY | Facility: CLINIC | Age: 60
End: 2025-09-02
Payer: COMMERCIAL

## 2025-09-03 ENCOUNTER — TELEPHONE (OUTPATIENT)
Dept: NEPHROLOGY | Facility: CLINIC | Age: 60
End: 2025-09-03
Payer: COMMERCIAL

## 2025-09-03 ENCOUNTER — DOCUMENTATION (OUTPATIENT)
Dept: CARE COORDINATION | Age: 60
End: 2025-09-03
Payer: COMMERCIAL

## 2025-09-03 ENCOUNTER — HOME CARE VISIT (OUTPATIENT)
Dept: HOME HEALTH SERVICES | Facility: HOME HEALTH | Age: 60
End: 2025-09-03
Payer: COMMERCIAL

## 2025-09-03 DIAGNOSIS — D63.1 ANEMIA DUE TO STAGE 5 CHRONIC KIDNEY DISEASE, NOT ON CHRONIC DIALYSIS: Primary | ICD-10-CM

## 2025-09-03 DIAGNOSIS — N18.5 ANEMIA DUE TO STAGE 5 CHRONIC KIDNEY DISEASE, NOT ON CHRONIC DIALYSIS: Primary | ICD-10-CM

## 2025-09-03 PROCEDURE — G0151 HHCP-SERV OF PT,EA 15 MIN: HCPCS

## 2025-09-03 SDOH — ECONOMIC STABILITY: HOUSING INSECURITY: OPEN FLAME PRESENT: 1

## 2025-09-03 SDOH — HEALTH STABILITY: PHYSICAL HEALTH: PHYSICAL EXERCISE: STANDING

## 2025-09-03 SDOH — HEALTH STABILITY: PHYSICAL HEALTH: EXERCISE TYPE: SEATED AND STANDING HEP

## 2025-09-03 SDOH — HEALTH STABILITY: PHYSICAL HEALTH: EXERCISE ACTIVITIES SETS: 2

## 2025-09-03 SDOH — HEALTH STABILITY: PHYSICAL HEALTH: PHYSICAL EXERCISE: SITTING

## 2025-09-03 SDOH — HEALTH STABILITY: PHYSICAL HEALTH: EXERCISE ACTIVITY: SEATED HEP

## 2025-09-03 SDOH — HEALTH STABILITY: PHYSICAL HEALTH: PHYSICAL EXERCISE: 15

## 2025-09-03 SDOH — HEALTH STABILITY: PHYSICAL HEALTH
EXERCISE COMMENTS: STANDING DYNAMIC HEP INCLUDES: HIP FLEXION WHILE WALKING 20 FEET, KNEE FLEXION WHILE WALKING 20 FEET, MARCHING WHILE WALKING 20 FEET  SEATED HEP INCLUDES ANKLE PUMPS, LAQS, HIP FLEXION, AND HIP ABDUCTION

## 2025-09-03 SDOH — HEALTH STABILITY: PHYSICAL HEALTH: PHYSICAL EXERCISE: 10

## 2025-09-03 SDOH — HEALTH STABILITY: PHYSICAL HEALTH: EXERCISE ACTIVITY: STANDING HEP

## 2025-09-03 ASSESSMENT — ENCOUNTER SYMPTOMS
OCCASIONAL FEELINGS OF UNSTEADINESS: 1
MUSCLE WEAKNESS: 1
PAIN: NO PAIN REPORTED TODAY
DEPRESSION: 0
DENIES PAIN: 1
LIMITED RANGE OF MOTION: 1
LOSS OF SENSATION IN FEET: 1
ARTHRALGIAS: 1

## 2025-09-03 ASSESSMENT — ACTIVITIES OF DAILY LIVING (ADL)
PHYSICAL TRANSFERS ASSESSED: 1
AMBULATION ASSISTANCE ON FLAT SURFACES: 1
AMBULATION ASSISTANCE: STAND BY ASSIST
CURRENT_FUNCTION: INDEPENDENT
AMBULATION ASSISTANCE: ONE PERSON
AMBULATION ASSISTANCE: 1

## 2025-09-04 ENCOUNTER — RESULTS FOLLOW-UP (OUTPATIENT)
Dept: NEPHROLOGY | Facility: HOSPITAL | Age: 60
End: 2025-09-04
Payer: COMMERCIAL

## 2025-09-04 DIAGNOSIS — N25.81 HYPERPARATHYROIDISM, SECONDARY (MULTI): Primary | ICD-10-CM

## 2025-09-05 ENCOUNTER — HOME CARE VISIT (OUTPATIENT)
Dept: HOME HEALTH SERVICES | Facility: HOME HEALTH | Age: 60
End: 2025-09-05
Payer: COMMERCIAL

## 2025-09-05 ENCOUNTER — APPOINTMENT (OUTPATIENT)
Dept: PRIMARY CARE | Facility: CLINIC | Age: 60
End: 2025-09-05
Payer: COMMERCIAL

## 2025-09-05 RX ORDER — CALCITRIOL 0.25 UG/1
0.25 CAPSULE ORAL 3 TIMES WEEKLY
Qty: 12 CAPSULE | Refills: 5 | Status: SHIPPED | OUTPATIENT
Start: 2025-09-05 | End: 2026-03-04

## 2025-09-23 ENCOUNTER — APPOINTMENT (OUTPATIENT)
Dept: NEPHROLOGY | Facility: CLINIC | Age: 60
End: 2025-09-23
Payer: COMMERCIAL

## 2025-10-02 ENCOUNTER — APPOINTMENT (OUTPATIENT)
Dept: NEPHROLOGY | Facility: CLINIC | Age: 60
End: 2025-10-02
Payer: COMMERCIAL

## 2025-10-20 ENCOUNTER — APPOINTMENT (OUTPATIENT)
Dept: PODIATRY | Facility: CLINIC | Age: 60
End: 2025-10-20
Payer: COMMERCIAL

## 2025-11-24 ENCOUNTER — APPOINTMENT (OUTPATIENT)
Dept: PRIMARY CARE | Facility: CLINIC | Age: 60
End: 2025-11-24
Payer: COMMERCIAL

## 2026-01-26 ENCOUNTER — APPOINTMENT (OUTPATIENT)
Age: 61
End: 2026-01-26
Payer: COMMERCIAL

## 2026-05-15 ENCOUNTER — APPOINTMENT (OUTPATIENT)
Dept: CARDIOLOGY | Facility: CLINIC | Age: 61
End: 2026-05-15
Payer: COMMERCIAL

## (undated) DEVICE — DRESSING, GAUZE, 16 PLY, 4 X 4 IN, STERILE

## (undated) DEVICE — DRESSING, MEPILEX BORDER, POST-OP AG, 4 X 6 IN

## (undated) DEVICE — APPLICATOR, CHLORAPREP, W/ORANGE TINT, 26ML

## (undated) DEVICE — COVER, CART, 45 X 27 X 48 IN, CLEAR

## (undated) DEVICE — DRAPE, SHEET, U, W/ADHESIVE STRIP, IMPERVIOUS, 60 X 70 IN, DISPOSABLE, LF, STERILE

## (undated) DEVICE — DRAPE, SHEET, THREE QUARTER, FAN FOLD, 57 X 77 IN

## (undated) DEVICE — COVER, BACK TABLE, 65 X 90, HVY REINFORCED

## (undated) DEVICE — DRILL BIT, 3.0MM X 215MM, CANNULATED

## (undated) DEVICE — DRAPE, IRRIGATION, W/POUCH, ADHESIVE STRIP, STERI DRAPE, 19 X 23 IN, DISPOSABLE, STERILE

## (undated) DEVICE — Device

## (undated) DEVICE — SOLUTION, IRRIGATION, X RX SODIUM CHL 0.9%, 1000ML BTL

## (undated) DEVICE — ELECTRODE, ELECTROSURGICAL, BLADE, INSULATED, ENT/IMA, STERILE

## (undated) DEVICE — COVER, C-ARM W/CLIPS, OEC GE

## (undated) DEVICE — DRAPE, PAD, PREP, W/ 9 IN CUFF, 24 X 41, LF, NS

## (undated) DEVICE — DRAPE COVER, C ARM, FLOUROSCAN IMAGING SYS

## (undated) DEVICE — SUTURE, ETHILON, 3-0, 18 IN, PS1, BLACK

## (undated) DEVICE — NEEDLE, BIOPSY, T- HANDLE, 8G X 4

## (undated) DEVICE — IRRIGATION SET, Y, LARGE BORE

## (undated) DEVICE — PAD, ELECTRODE DEFIB PADPRO ADULT STRL W/ADAPTER

## (undated) DEVICE — ACCESS KIT, MINI MAK, 4 FR X 10CM, 0.018 X 40CM, NITINOL/PLATINUM, STD NEEDLE

## (undated) DEVICE — DRAPE, INCISE, ANTIMICROBIAL, IOBAN 2, LARGE, 17 X 23 IN, DISPOSABLE, STERILE

## (undated) DEVICE — STAPLER, SKIN PROXIMATE, 35 WIDE

## (undated) DEVICE — INTERPULSE HANDPIECE SET, W/RETRACTABLE COAXIAL FAN SPRAY TIP & SUCTION TUBE

## (undated) DEVICE — HEMOSTAT, SURGICEL POWDER 3.0GRAMS

## (undated) DEVICE — DRAPE, SHEET, EXTREMITY, W/ARM BOARD COVERS, 87 X 106 X 128 IN, DISPOSABLE, LF, STERILE

## (undated) DEVICE — BANDAGE, ELASTIC, ACE, ACE, DOUBLE LENGTH, 6 X 550 IN, LF

## (undated) DEVICE — BANDAGE, ELASTIC, SELF-CLOSE, 6 IN, HONEYCOMB, STERILE

## (undated) DEVICE — SYRINGE, LUER LOCK, 12ML

## (undated) DEVICE — PREP, IODOPHOR, W/ALCOHOL, DURAPREP, W/APPLICATOR, 26 CC

## (undated) DEVICE — SUTURE, MONOCRYL, 2-0, 27 IN, SH/V-20 , UNDYED

## (undated) DEVICE — PACKING, PLAIN, CURITY, 0.5 IN X 5 YD, STERILE

## (undated) DEVICE — COLLECTION/DELIVERY SYSTEM, COPAN ESWAB, REG SIZE SWAB

## (undated) DEVICE — BANDAGE, COFLEX, 6 X 5 YDS, FOAM TAN, STERILE, LF

## (undated) DEVICE — COVER, XRAY, CASSETTE, 21 X 40 IN, STERILE

## (undated) DEVICE — MANIFOLD, 4 PORT NEPTUNE STANDARD

## (undated) DEVICE — PREP, SKIN, DURAPREP, 6 CC

## (undated) DEVICE — SUTURE, PDS II, 0, 27 IN, CT-2, VIOLET

## (undated) DEVICE — TUBING, SUCTION, 6MM X 10, CLEAN N-COND

## (undated) DEVICE — DRESSING, NON-ADHERENT, OIL EMULSION, CURITY, 3 X 8 IN, STERILE

## (undated) DEVICE — SOLUTION, INJECTION, SODIUM CHLORIDE 9%, 3000ML

## (undated) DEVICE — VALVE KIT, PHD HEMOSTASIS, W/TOOL AND TORQUE DEVICE

## (undated) DEVICE — SPONGE, GAUZE, AVANT, STERILE, NONWOVEN, 4PLY, 4 X 4, STANDARD

## (undated) DEVICE — TUBESET, IRRIGATION, BONE CUTTER BONESCALPEL

## (undated) DEVICE — GLOVE, SURGICAL, PROTEXIS PI , 7.0, PF, LF

## (undated) DEVICE — CATHETER, THERMODILUTION, SWAN GANZ, HI-SHORE, COATED, W/GUIDEWIRE, 7 FR, 110 CM

## (undated) DEVICE — SUTURE, ETHILON, 2-0, 18 IN, FS, BLACK, BX/12

## (undated) DEVICE — CONTAINER, SPECIMEN, 120 ML, STERILE

## (undated) DEVICE — BLADE, SAW, SAGITTAL, DUAL CUT, 18 X 90 X 1.27

## (undated) DEVICE — BANDAGE, ELASTIC, MATRIX, SELF-CLOSURE, 4 IN X 5 YD, LF

## (undated) DEVICE — CONTAINER, SPECIMEN, 4 OZ, OR PEEL PACK, STERILE

## (undated) DEVICE — PAD, GROUNDING, ELECTROSURGICAL, W/9 FT CABLE, POLYHESIVE II, ADULT, LF

## (undated) DEVICE — NEEDLE, HYPODERMIC, 23G X 1-1/4 IN

## (undated) DEVICE — TOWEL, SURGICAL, NEURO, O/R, 16 X 26, BLUE, STERILE

## (undated) DEVICE — NEEDLE, HYPODERMIC, NEEDLE PRO, 25G X 1.5, ORANGE

## (undated) DEVICE — GUIDEWIRE, RUNTHROUGH, NS, 150CM, FLOPPY EXT

## (undated) DEVICE — DRESSING, ABDOMINAL, TENDERSORB, 8 X 10 IN, STERILE

## (undated) DEVICE — BANDAGE, GAUZE, CONFORMING, KERLIX, 6 PLY, 4.5 IN X 4.1 YD

## (undated) DEVICE — PADDING, UNDERCAST, WEBRIL, 4 IN X 4 YD, REG, NS

## (undated) DEVICE — DRESSING, ABDOMINAL, TENDERSORB, 8 X 7-1/2 IN, STERILE

## (undated) DEVICE — BLADE, OSCILLATING/SAGITTAL, 25MM X 9MM

## (undated) DEVICE — DRAPE, INSTRUMENT, W/POUCH, STERI DRAPE, 7 X 11 IN, DISPOSABLE, STERILE

## (undated) DEVICE — CATHETER, WEDGE PRESSURE, BALLOON, DOUBLE LUMEN, 5 FR, 110 CM

## (undated) DEVICE — BANDAGE, ELASTIC, SELF-CLOSE, 4 IN, HONEYCOMB, STERILE

## (undated) DEVICE — NITINOL KIT, GLIDESHEATH, 5FR

## (undated) DEVICE — GUIDEWIRE, AMPLATZ SUPER STIFF, STR, .035 IN X 75CM

## (undated) DEVICE — BANDAGE, GAUZE, COTTON, STERILE, BULKEE II, 4.5IN X 4.1YD

## (undated) DEVICE — TUBING, SUCTION, CONNECTING, STERILE 0.25 X 120 IN., LF

## (undated) DEVICE — BLADE, SAW, SAGITTAL, 18.5 X 73 X 0.76 MM, STAINLESS STEEL, STERILE

## (undated) DEVICE — SHEATH, PINNACLE, 10 CM,  6FR INTRODUCER, 6FR DIA, 2.5 CM DIALATOR

## (undated) DEVICE — DRESSING, NON-ADHERENT, 3 X 3 IN, STERILE

## (undated) DEVICE — SLEEVE, SURGICAL, 21.5 X 5.5 IN, LF, STERILE

## (undated) DEVICE — TIP, SUCTION, YANKAUER, FLEXIBLE

## (undated) DEVICE — DRESSING, WOUND, MEPITEL, 4X8

## (undated) DEVICE — ACCESS KIT, S-MAK MINI, 4FR 10CM 0.018IN 40CM, NT/PT, ECHO ENHANCE NEEDLE

## (undated) DEVICE — SHEATH, PINNACLE, 10 CM,  7FR INTRODUCER, 7FR DIA, 2.5 CM DIALATOR

## (undated) DEVICE — DRILL, LOCKING, 4.2 X 360MM

## (undated) DEVICE — LOCKING DRILL

## (undated) DEVICE — APPLICATOR, PREP, CHLORAPREP, W/ORANGE TINT, 10.5ML

## (undated) DEVICE — DRAPE, SHEET, FAN FOLDED, HALF, 44 X 58 IN, DISPOSABLE, LF, STERILE

## (undated) DEVICE — DRESSING, ABDOMINAL PAD, CURITY, 8 X 10 IN